# Patient Record
Sex: MALE | Race: BLACK OR AFRICAN AMERICAN | Employment: OTHER | URBAN - METROPOLITAN AREA
[De-identification: names, ages, dates, MRNs, and addresses within clinical notes are randomized per-mention and may not be internally consistent; named-entity substitution may affect disease eponyms.]

---

## 2017-01-09 RX ORDER — PRASUGREL HYDROCHLORIDE 10 MG/1
TABLET, COATED ORAL
Qty: 90 TAB | Refills: 2 | Status: SHIPPED | OUTPATIENT
Start: 2017-01-09 | End: 2017-10-08 | Stop reason: SDUPTHER

## 2017-01-27 RX ORDER — FUROSEMIDE 20 MG/1
TABLET ORAL
Qty: 90 TAB | Refills: 2 | Status: SHIPPED | OUTPATIENT
Start: 2017-01-27 | End: 2017-10-26 | Stop reason: SDUPTHER

## 2017-02-20 ENCOUNTER — OFFICE VISIT (OUTPATIENT)
Dept: INTERNAL MEDICINE CLINIC | Age: 72
End: 2017-02-20

## 2017-02-20 VITALS
SYSTOLIC BLOOD PRESSURE: 126 MMHG | HEIGHT: 74 IN | BODY MASS INDEX: 31.29 KG/M2 | DIASTOLIC BLOOD PRESSURE: 66 MMHG | WEIGHT: 243.8 LBS | OXYGEN SATURATION: 98 % | TEMPERATURE: 98.6 F | RESPIRATION RATE: 16 BRPM | HEART RATE: 84 BPM

## 2017-02-20 DIAGNOSIS — R68.83 CHILLS: ICD-10-CM

## 2017-02-20 DIAGNOSIS — Z20.828 EXPOSURE TO INFLUENZA: ICD-10-CM

## 2017-02-20 DIAGNOSIS — R05.9 COUGH: ICD-10-CM

## 2017-02-20 DIAGNOSIS — J06.9 ACUTE URI: Primary | ICD-10-CM

## 2017-02-20 DIAGNOSIS — R11.0 NAUSEA: ICD-10-CM

## 2017-02-20 DIAGNOSIS — R42 DIZZINESS: ICD-10-CM

## 2017-02-20 LAB
GLUCOSE POC: 193 MG/DL
QUICKVUE INFLUENZA TEST: NEGATIVE
VALID INTERNAL CONTROL?: YES

## 2017-02-20 RX ORDER — DOXYCYCLINE 100 MG/1
100 TABLET ORAL 2 TIMES DAILY
Qty: 14 TAB | Refills: 0 | Status: SHIPPED | OUTPATIENT
Start: 2017-02-20 | End: 2017-02-27

## 2017-02-20 RX ORDER — OSELTAMIVIR PHOSPHATE 75 MG/1
75 CAPSULE ORAL DAILY
Qty: 7 CAP | Refills: 0 | Status: SHIPPED | OUTPATIENT
Start: 2017-02-20 | End: 2017-02-27

## 2017-02-20 NOTE — PATIENT INSTRUCTIONS
It was a pleasure to see you! As discussed:    Upper respiratory tract infection (URI)  You have a bacterial URI. - Take the antibiotics as prescribed. -Use nasal saline spray 3-4 times/day  -Start taking a non sedating antihistamine such as Claritin, Allegra or Zyrtec (generic is fine)  For your  Symptoms:  -Sore throat: Cepacol or similar lozenges, Salt water gargles  -Itching: Thick creams/ lotions; Nonsedating antihistamine such as Allegra, Zyrtec, or Claritin (generic is fine)  -Pain/ Aches: You can use Ibuprofen (no more than 2400 mg/day) or Aleve (no more than 880mg/ day) as needed. Do not use any other NSAIDs while on this medication. Do not use NSAIDs if you have high blood pressure or history of ulcers or abnormal bleeding. OR   -Take Tylenol as needed for headache and body aches (every 4-8 hours, do not use more than 3000mg in one day)    You have been exposed to the flu. I ordered tamiflu. Dizziness: Care Instructions  Your Care Instructions  Dizziness is the feeling of unsteadiness or fuzziness in your head. It is different than having vertigo, which is a feeling that the room is spinning or that you are moving or falling. It is also different from lightheadedness, which is the feeling that you are about to faint. It can be hard to know what causes dizziness. Some people feel dizzy when they have migraine headaches. Sometimes bouts of flu can make you feel dizzy. Some medical conditions, such as heart problems or high blood pressure, can make you feel dizzy. Many medicines can cause dizziness, including medicines for high blood pressure, pain, or anxiety. If a medicine causes your symptoms, your doctor may recommend that you stop or change the medicine. If it is a problem with your heart, you may need medicine to help your heart work better.  If there is no clear reason for your symptoms, your doctor may suggest watching and waiting for a while to see if the dizziness goes away on its own.  Follow-up care is a key part of your treatment and safety. Be sure to make and go to all appointments, and call your doctor if you are having problems. It's also a good idea to know your test results and keep a list of the medicines you take. How can you care for yourself at home? · If your doctor recommends or prescribes medicine, take it exactly as directed. Call your doctor if you think you are having a problem with your medicine. · Do not drive while you feel dizzy. · Try to prevent falls. Steps you can take include:  ¨ Using nonskid mats, adding grab bars near the tub, and using night-lights. ¨ Clearing your home so that walkways are free of anything you might trip on. ¨ Letting family and friends know that you have been feeling dizzy. This will help them know how to help you. When should you call for help? Call 911 anytime you think you may need emergency care. For example, call if:  · You passed out (lost consciousness). · You have dizziness along with symptoms of a heart attack. These may include:  ¨ Chest pain or pressure, or a strange feeling in the chest.  ¨ Sweating. ¨ Shortness of breath. ¨ Nausea or vomiting. ¨ Pain, pressure, or a strange feeling in the back, neck, jaw, or upper belly or in one or both shoulders or arms. ¨ Lightheadedness or sudden weakness. ¨ A fast or irregular heartbeat. · You have symptoms of a stroke. These may include:  ¨ Sudden numbness, tingling, weakness, or loss of movement in your face, arm, or leg, especially on only one side of your body. ¨ Sudden vision changes. ¨ Sudden trouble speaking. ¨ Sudden confusion or trouble understanding simple statements. ¨ Sudden problems with walking or balance. ¨ A sudden, severe headache that is different from past headaches. Call your doctor now or seek immediate medical care if:  · You feel dizzy and have a fever, headache, or ringing in your ears. · You have new or increased nausea and vomiting.   · Your dizziness does not go away or comes back. Watch closely for changes in your health, and be sure to contact your doctor if:  · You do not get better as expected. Where can you learn more? Go to http://vanna-byron.info/. Enter Q957 in the search box to learn more about \"Dizziness: Care Instructions. \"  Current as of: May 27, 2016  Content Version: 11.1  © 0477-2472 [employer]. Care instructions adapted under license by [employer] (which disclaims liability or warranty for this information). If you have questions about a medical condition or this instruction, always ask your healthcare professional. [address] any warranty or liability for your use of this information.

## 2017-02-20 NOTE — MR AVS SNAPSHOT
Visit Information Date & Time Provider Department Dept. Phone Encounter #  
 2/20/2017 10:30 AM Chris Norton MD [facility] Internal Medicine [phone] [id number] Follow-up Instructions Return if symptoms worsen or fail to improve within 7 days, for Follow-up with Dr. Cristino Eid. Your Appointments 3/7/2017  8:30 AM  
ROUTINE CARE with Gisela Travis MD  
[facility] Internal Medicine [facility] Appt Note: 6 month fu  
 [address] [facility] [address]  
[address] [id number] [place] [address] Upcoming Health Maintenance Date Due Hepatitis C Screening 1945 DTaP/Tdap/Td series (1 - Tdap) 1/24/1967 MEDICARE YEARLY EXAM 6/12/2015 INFLUENZA AGE 9 TO ADULT 8/1/2016 HEMOGLOBIN A1C Q6M 8/19/2016 MICROALBUMIN Q1 2/19/2017 LIPID PANEL Q1 2/19/2017 EYE EXAM RETINAL OR DILATED Q1 8/2/2017 FOOT EXAM Q1 9/9/2017 GLAUCOMA SCREENING Q2Y 8/2/2018 COLONOSCOPY 4/19/2026 Allergies as of 2/20/2017  Review Complete On: 2/20/2017 By: Chris Norton MD  
  
 Severity Noted Reaction Type Reactions Pcn [Penicillins]  03/17/2010    Hives Current Immunizations  Reviewed on 12/5/2012 Name Date Influenza Vaccine Whole 10/11/2010 Pneumococcal Polysaccharide (PPSV-23) 12/5/2012 Pneumococcal Vaccine (Unspecified Type) 1/1/2002 Zoster 10/10/2010 Not reviewed this visit You Were Diagnosed With   
  
 Codes Comments Acute URI    -  Primary ICD-10-CM: J06.9 ICD-9-CM: 465.9 Cough     ICD-10-CM: R05 ICD-9-CM: 045. 2 Chills     ICD-10-CM: R68.83 ICD-9-CM: 780.64 Nausea     ICD-10-CM: R11.0 ICD-9-CM: 787.02 Dizziness     ICD-10-CM: O48 ICD-9-CM: 780.4 Exposure to influenza     ICD-10-CM: Z20.828 ICD-9-CM: V01.79 Vitals BP Pulse Temp Resp Height(growth percentile) Weight(growth percentile) 126/66 (BP 1 Location: Right arm, BP Patient Position: Sitting) 84 98.6 °F (37 °C) (Oral) 16 6' 2\" (1.88 m) 243 lb 12.8 oz (110.6 kg) SpO2 BMI Smoking Status 98% 31.3 kg/m2 Never Smoker Vitals History BMI and BSA Data Body Mass Index Body Surface Area  
 31.3 kg/m 2 2.4 m 2 Preferred Pharmacy Pharmacy Name Phone [address] [facility] [address] [facility] [phone] Your Updated Medication List  
  
   
This list is accurate as of: 2/20/17 11:12 AM.  Always use your most recent med list.  
  
  
  
  
 aspirin delayed-release 81 mg tablet Take 81 mg by mouth daily. azithromycin 500 mg Tab Commonly known as:  Radha Ax Take 2 , 1 hour before dental procedure BENADRYL ALLERGY 25 mg tablet Generic drug:  diphenhydrAMINE Take 25 mg by mouth every six (6) hours as needed for Sleep.  
  
 carvedilol 6.25 mg tablet Commonly known as:  COREG  
TAKE 1 TABLET TWICE A DAY WITH MEALS  
  
 CRESTOR 10 mg tablet Generic drug:  rosuvastatin Take 10 mg by mouth nightly. doxycycline 100 mg tablet Commonly known as:  ADOXA Take 1 Tab by mouth two (2) times a day for 7 days. EFFIENT 10 mg tablet Generic drug:  prasugrel TAKE 1 TABLET DAILY fosinopril 10 mg tablet Commonly known as:  MONOPRIL  
TAKE 1 TABLET DAILY  
  
 furosemide 20 mg tablet Commonly known as:  LASIX TAKE 1 TABLET DAILY  
  
 glipiZIDE SR 10 mg CR tablet Commonly known as:  GLUCOTROL XL  
TAKE 1 TABLET DAILY  
  
 glucose blood VI test strips strip Commonly known as:  blood glucose test  
[employer] brand test strips. Dx .00  
  
 metFORMIN 500 mg tablet Commonly known as:  GLUCOPHAGE  
TAKE 2 TABLETS TWICE A DAY WITH MEALS  
  
 nitroglycerin 0.4 mg SL tablet Commonly known as:  NITROSTAT  
1 tablet by SubLINGual route every five (5) minutes as needed for Chest Pain. omeprazole 40 mg capsule Commonly known as:  PRILOSEC Take 1 capsule by mouth daily as needed. oseltamivir 75 mg capsule Commonly known as:  TAMIFLU Take 1 Cap by mouth daily for 7 days. potassium chloride SR 10 mEq tablet Commonly known as:  KLOR-CON 10 Take 3 Tabs by mouth daily. spironolactone 25 mg tablet Commonly known as:  ALDACTONE  
TAKE 1 TABLET DAILY  
  
 tamsulosin 0.4 mg capsule Commonly known as:  FLOMAX Take 1 Cap by mouth daily. TYLENOL EXTRA STRENGTH 500 mg tablet Generic drug:  acetaminophen Take  by mouth every six (6) hours as needed. Prescriptions Sent to Pharmacy Refills  
 doxycycline (ADOXA) 100 mg tablet 0 Sig: Take 1 Tab by mouth two (2) times a day for 7 days. Class: Normal  
 Pharmacy: [facility], [address] [place] [address] RD AT [address] [place] [address] Ph #: [phone] Route: Oral  
 oseltamivir (TAMIFLU) 75 mg capsule 0 Sig: Take 1 Cap by mouth daily for 7 days. Class: Normal  
 Pharmacy: [facility], [address] [place] [address] RD AT [address] [place] [address] Ph #: [phone] Route: Oral  
  
We Performed the Following AMB POC GLUCOSE BLOOD, BY GLUCOSE MONITORING DEVICE [43682 CPT(R)] AMB POC RAPID INFLUENZA TEST [73666 CPT(R)] Follow-up Instructions Return if symptoms worsen or fail to improve within 7 days, for Follow-up with Dr. Morgan Munoz. Patient Instructions It was a pleasure to see you! As discussed: 
 
Upper respiratory tract infection (URI) You have a bacterial URI. - Take the antibiotics as prescribed. -Use nasal saline spray 3-4 times/day 
-Start taking a non sedating antihistamine such as Claritin, Allegra or Zyrtec (generic is fine) For your  Symptoms: 
-Sore throat: Cepacol or similar lozenges, Salt water gargles -Itching: Thick creams/ lotions;  Nonsedating antihistamine such as Allegra, Zyrtec, or Claritin (generic is fine) 
-Pain/ Aches: You can use Ibuprofen (no more than 2400 mg/day) or Aleve (no more than 880mg/ day) as needed. Do not use any other NSAIDs while on this medication. Do not use NSAIDs if you have high blood pressure or history of ulcers or abnormal bleeding. OR  
-Take Tylenol as needed for headache and body aches (every 4-8 hours, do not use more than 3000mg in one day) You have been exposed to the flu. I ordered tamiflu. Dizziness: Care Instructions Your Care Instructions Dizziness is the feeling of unsteadiness or fuzziness in your head. It is different than having vertigo, which is a feeling that the room is spinning or that you are moving or falling. It is also different from lightheadedness, which is the feeling that you are about to faint. It can be hard to know what causes dizziness. Some people feel dizzy when they have migraine headaches. Sometimes bouts of flu can make you feel dizzy. Some medical conditions, such as heart problems or high blood pressure, can make you feel dizzy. Many medicines can cause dizziness, including medicines for high blood pressure, pain, or anxiety. If a medicine causes your symptoms, your doctor may recommend that you stop or change the medicine. If it is a problem with your heart, you may need medicine to help your heart work better. If there is no clear reason for your symptoms, your doctor may suggest watching and waiting for a while to see if the dizziness goes away on its own. Follow-up care is a key part of your treatment and safety. Be sure to make and go to all appointments, and call your doctor if you are having problems. It's also a good idea to know your test results and keep a list of the medicines you take. How can you care for yourself at home? · If your doctor recommends or prescribes medicine, take it exactly as directed. Call your doctor if you think you are having a problem with your medicine. · Do not drive while you feel dizzy. · Try to prevent falls. Steps you can take include: ¨ Using nonskid mats, adding grab bars near the tub, and using night-lights. ¨ Clearing your home so that walkways are free of anything you might trip on. ¨ Letting family and friends know that you have been feeling dizzy. This will help them know how to help you. When should you call for help? Call 911 anytime you think you may need emergency care. For example, call if: 
· You passed out (lost consciousness). · You have dizziness along with symptoms of a heart attack. These may include: ¨ Chest pain or pressure, or a strange feeling in the chest. 
¨ Sweating. ¨ Shortness of breath. ¨ Nausea or vomiting. ¨ Pain, pressure, or a strange feeling in the back, neck, jaw, or upper belly or in one or both shoulders or arms. ¨ Lightheadedness or sudden weakness. ¨ A fast or irregular heartbeat. · You have symptoms of a stroke. These may include: 
¨ Sudden numbness, tingling, weakness, or loss of movement in your face, arm, or leg, especially on only one side of your body. ¨ Sudden vision changes. ¨ Sudden trouble speaking. ¨ Sudden confusion or trouble understanding simple statements. ¨ Sudden problems with walking or balance. ¨ A sudden, severe headache that is different from past headaches. Call your doctor now or seek immediate medical care if: 
· You feel dizzy and have a fever, headache, or ringing in your ears. · You have new or increased nausea and vomiting. · Your dizziness does not go away or comes back. Watch closely for changes in your health, and be sure to contact your doctor if: 
· You do not get better as expected. Where can you learn more? Go to http://vanna-byron.info/. Enter A213 in the search box to learn more about \"Dizziness: Care Instructions. \" Current as of: May 27, 2016 Content Version: 11.1 © 9850-7836 [employer].  Care instructions adapted under license by [address] (which disclaims liability or warranty for this information). If you have questions about a medical condition or this instruction, always ask your healthcare professional. [address] any warranty or liability for your use of this information. Introducing [facility] & HEALTH SERVICES! Dear Mariana Marin: Thank you for requesting a [employer] account. Our records indicate that you have previously registered for a [employer] account but its currently inactive. Please call our [employer] support line at [phone]. Additional Information If you have questions, please visit the Frequently Asked Questions section of the [employer] website at [email or web address]/. Remember, [employer] is NOT to be used for urgent needs. For medical emergencies, dial 911. Now available from your iPhone and Android! Please provide this summary of care documentation to your next provider. Your primary care clinician is listed as [facility]O [facility]. If you have any questions after today's visit, please call [phone].

## 2017-02-20 NOTE — PROGRESS NOTES
HISTORY OF PRESENT ILLNESS  Merlin Gaines is a 71 y.o. male. Cold Symptoms   The problem has been gradually worsening. The cough is productive of purulent sputum. There has been no fever. Associated symptoms include chills, rhinorrhea, myalgias, shortness of breath and nausea. Pertinent negatives include no chest pain, no ear pain, no headaches and no sore throat. Treatments tried: Benadryl and Mucinex        Review of Systems   Constitutional: Positive for chills. HENT: Positive for rhinorrhea. Negative for ear pain and sore throat. Respiratory: Positive for cough and shortness of breath. Cardiovascular: Negative for chest pain. Gastrointestinal: Positive for abdominal pain (cramping ) and nausea. Genitourinary: Negative for dysuria and urgency. Musculoskeletal: Positive for myalgias. Neurological: Positive for dizziness (lightheaded, has not checked his glucose today. has been 139). Negative for headaches.      Patient Active Problem List    Diagnosis Date Noted    Type 2 diabetes, controlled, with peripheral circul[facility] disorder [facility] 02/10/2016    CHF, stage C ([facility] 08/06/2015    Mixed hyperlipidemia 08/06/2015    Proteinuria 06/19/2014    BPH without obstruction/lower urinary tract symptoms 06/11/2014    Hematuria, gross 02/19/2013    Cardiac arrest ([facility] 02/04/2013    Insomnia, unspecified 07/23/2012    Encounter for long-term (current) use of other medications 03/26/2012    Calculus of kidney 03/22/2010    Coronary atherosclerosis of native coronary artery 03/22/2010    Benign neoplasm of colon 03/22/2010    Unspecified sleep apnea 03/22/2010    Reflux esophagitis 03/22/2010       Current Outpatient Prescriptions   Medication Sig Dispense Refill    furosemide (LASIX) 20 mg tablet TAKE 1 TABLET DAILY 90 Tab 2    EFFIENT 10 mg tablet TAKE 1 TABLET DAILY 90 Tab 2    spironolactone (ALDACTONE) 25 mg tablet TAKE 1 TABLET DAILY 90 Tab 3    carvedilol (COREG) 6.25 mg tablet TAKE 1 TABLET TWICE A DAY WITH MEALS 180 Tab 3    glipiZIDE SR (GLUCOTROL) 10 mg CR tablet TAKE 1 TABLET DAILY 90 Tab 3    potassium chloride SR (KLOR-CON 10) 10 mEq tablet Take 3 Tabs by mouth daily. 270 Tab 3    tamsulosin (FLOMAX) 0.4 mg capsule Take 1 Cap by mouth daily. 15 Cap 4    fosinopril (MONOPRIL) 10 mg tablet TAKE 1 TABLET DAILY 90 Tab 3    metFORMIN (GLUCOPHAGE) 500 mg tablet TAKE 2 TABLETS TWICE A DAY WITH MEALS 360 Tab 4    diphenhydrAMINE (BENADRYL ALLERGY) 25 mg tablet Take 25 mg by mouth every six (6) hours as needed for Sleep.  nitroglycerin (NITROSTAT) 0.4 mg SL tablet 1 tablet by SubLINGual route every five (5) minutes as needed for Chest Pain. 25 tablet 99    omeprazole (PRILOSEC) 40 mg capsule Take 1 capsule by mouth daily as needed. 1 capsule 0    rosuvastatin (CRESTOR) 10 mg tablet Take 10 mg by mouth nightly.  glucose blood VI test strips (BLOOD GLUCOSE TEST) strip [employer] test strips. Dx .00 1 Package 11    acetaminophen (TYLENOL EXTRA STRENGTH) 500 mg tablet Take  by mouth every six (6) hours as needed.  aspirin delayed-release 81 mg tablet Take 81 mg by mouth daily.  azithromycin (ZITHROMAX) 500 mg tablet Take 2 , 1 hour before dental procedure 2 tablet 5       Allergies   Allergen Reactions    Pcn [Penicillins] Hives      Visit Vitals    /66 (BP 1 Location: Right arm, BP Patient Position: Sitting)    Pulse 84    Temp 98.6 °F (37 °C) (Oral)    Resp 16    Ht 6' 2\" (1.88 m)    Wt 243 lb 12.8 oz (110.6 kg)    SpO2 98%    BMI 31.3 kg/m2       Physical Exam   Constitutional: He is oriented to person, place, and time. No distress. Wife present in exam room (seen at 10:45 appt)    HENT:   Right Ear: Tympanic membrane, external ear and ear canal normal.   Left Ear: Tympanic membrane, external ear and ear canal normal.   Nose: Mucosal edema and rhinorrhea present. Mouth/Throat: Posterior oropharyngeal erythema present.  No oropharyngeal exudate. Cardiovascular: Normal rate, regular rhythm and normal heart sounds. Exam reveals no friction rub. No murmur heard. Pulmonary/Chest: No accessory muscle usage. No tachypnea and no bradypnea. No respiratory distress. He has no decreased breath sounds. He has no wheezes. He has no rhonchi. He has no rales. Paroxysmal coughing    Musculoskeletal: He exhibits no edema. Lymphadenopathy:     He has cervical adenopathy (shoddy tender ). Neurological: He is alert and oriented to person, place, and time. Skin: He is not diaphoretic. Psychiatric: He has a normal mood and affect. Recent Results (from the past 12 hour(s))   AMB POC RAPID INFLUENZA TEST    Collection Time: 02/20/17 10:45 AM   Result Value Ref Range    VALID INTERNAL CONTROL POC Yes     QuickVue Influenza test Negative Negative     POC glc is 193     ASSESSMENT and PLAN  Amy Francisco was seen today for cough. Diagnoses and all orders for this visit:    Acute URI- likely bacterial given s/s. His flu test was negative but known exposure to wife. Will give tamiflu ppx despite Flu vaccine [id number] non covered strains reported. Red flags to warrant ER or earlier clinical evaluation reviewed. Red flags to warrant ER or earlier clinical evaluation reviewed. -     doxycycline (ADOXA) 100 mg tablet; Take 1 Tab by mouth two (2) times a day for 7 days. Dizziness- likely due to poor po intake. VSS and no cardiac red flags reported. Optimize oral intake Red flags to warrant ER or earlier clinical evaluation reviewed. See AVS for full details of plan and patient discussion.     -     AMB POC GLUCOSE BLOOD, BY GLUCOSE MONITORING DEVICE    Exposure to influenza  -     oseltamivir (TAMIFLU) 75 mg capsule; Take 1 Cap by mouth daily for 7 days. Follow-up Disposition:  Return if symptoms worsen or fail to improve within 7 days, for Follow-up with Dr. Ashleigh Brady.     Medication risks/benefits/costs/interactions/alternatives discussed with patient and wife . Eden Calderon  was given an after visit summary which includes diagnoses, current medications, & vitals. he expressed understanding with the diagnosis and plan.     20 minutes of 25 minutes of care coordination was spent counseling patient on treatment plan and assessing understanding

## 2017-02-27 ENCOUNTER — OFFICE VISIT (OUTPATIENT)
Dept: INTERNAL MEDICINE CLINIC | Age: 72
End: 2017-02-27

## 2017-02-27 VITALS
SYSTOLIC BLOOD PRESSURE: 122 MMHG | DIASTOLIC BLOOD PRESSURE: 70 MMHG | WEIGHT: 242.8 LBS | OXYGEN SATURATION: 99 % | HEART RATE: 68 BPM | RESPIRATION RATE: 16 BRPM | HEIGHT: 74 IN | BODY MASS INDEX: 31.16 KG/M2 | TEMPERATURE: 97.4 F

## 2017-02-27 DIAGNOSIS — R05.9 COUGH: ICD-10-CM

## 2017-02-27 DIAGNOSIS — R09.82 PND (POST-NASAL DRIP): Primary | ICD-10-CM

## 2017-02-27 RX ORDER — CETIRIZINE HYDROCHLORIDE 5 MG/1
5 TABLET ORAL DAILY
Qty: 30 TAB | Refills: 0 | Status: SHIPPED | OUTPATIENT
Start: 2017-02-27 | End: 2017-03-13

## 2017-02-27 RX ORDER — HYDROCODONE POLISTIREX AND CHLORPHENIRAMINE POLISTIREX 10; 8 MG/5ML; MG/5ML
5 SUSPENSION, EXTENDED RELEASE ORAL
Qty: 115 ML | Refills: 0 | Status: SHIPPED | OUTPATIENT
Start: 2017-02-27 | End: 2017-10-18

## 2017-02-27 NOTE — MR AVS SNAPSHOT
Visit Information Date & Time Provider Department Dept. Phone Encounter #  
 2/27/2017 10:45 AM Jennett Lanes, MD [facility] Internal Medicine [phone] Follow-up Instructions Return if symptoms worsen or fail to improve. Your Appointments 3/7/2017  8:30 AM  
ROUTINE CARE with Stiven Nicholas MD  
[facility] [place] Internal Medicine [address] [facility]) Appt Note: 6 month fu  
 [address] [facility] [address] [facility] [address]  
[address] [address] [facility] Upcoming Health Maintenance Date Due Hepatitis C Screening 1945 DTaP/Tdap/Td series (1 - Tdap) 1/24/1967 MEDICARE YEARLY EXAM 6/12/2015 INFLUENZA AGE 9 TO ADULT 8/1/2016 HEMOGLOBIN A1C Q6M 8/19/2016 MICROALBUMIN Q1 2/19/2017 LIPID PANEL Q1 2/19/2017 FOOT EXAM Q1 9/9/2017 EYE EXAM RETINAL OR DILATED Q1 2/7/2018 GLAUCOMA SCREENING Q2Y 2/7/2019 COLONOSCOPY 4/19/2026 Allergies as of 2/27/2017  Review Complete On: 2/27/2017 By: Jennett Lanes, MD  
  
 Severity Noted Reaction Type Reactions Pcn [Penicillins]  03/17/2010    Hives Current Immunizations  Reviewed on 12/5/2012 Name Date Influenza Vaccine Whole 10/11/2010 Pneumococcal Polysaccharide (PPSV-23) 12/5/2012 Pneumococcal Vaccine (Unspecified Type) 1/1/2002 Zoster 10/10/2010 Not reviewed this visit You Were Diagnosed With   
  
 Codes Comments PND (post-nasal drip)    -  Primary ICD-10-CM: R09.82 ICD-9-CM: 784.91 Cough     ICD-10-CM: R05 ICD-9-CM: 472. 2 Vitals BP  
  
  
  
  
  
 122/70 (BP 1 Location: Right arm, BP Patient Position: Sitting) Vitals History BMI and BSA Data Body Mass Index Body Surface Area  
 31.17 kg/m 2 2.4 m 2 Preferred Pharmacy Pharmacy Name Phone  [facility] [phone] Your Updated Medication List  
  
   
This list is accurate as of: 2/27/17 11:23 AM.  Always use your most recent med list.  
  
  
  
  
 aspirin delayed-release 81 mg tablet Take 81 mg by mouth daily. azithromycin 500 mg Tab Commonly known as:  Charlotta Primrose Take 2 , 1 hour before dental procedure  
  
 carvedilol 6.25 mg tablet Commonly known as:  COREG  
TAKE 1 TABLET TWICE A DAY WITH MEALS  
  
 cetirizine 5 mg tablet Commonly known as:  ZYRTEC Take 1 Tab by mouth daily for 14 days. chlorpheniramine-HYDROcodone 10-8 mg/5 mL suspension Commonly known as:  Venkatesh Skeens Take 5 mL by mouth nightly as needed for Cough. Max Daily Amount: 5 mL. CRESTOR 10 mg tablet Generic drug:  rosuvastatin Take 10 mg by mouth nightly. doxycycline 100 mg tablet Commonly known as:  ADOXA Take 1 Tab by mouth two (2) times a day for 7 days. EFFIENT 10 mg tablet Generic drug:  prasugrel TAKE 1 TABLET DAILY fosinopril 10 mg tablet Commonly known as:  MONOPRIL  
TAKE 1 TABLET DAILY  
  
 furosemide 20 mg tablet Commonly known as:  LASIX TAKE 1 TABLET DAILY  
  
 glipiZIDE SR 10 mg CR tablet Commonly known as:  GLUCOTROL XL  
TAKE 1 TABLET DAILY  
  
 glucose blood VI test strips strip Commonly known as:  blood glucose test  
[employer] brand test strips. Dx .00  
  
 metFORMIN 500 mg tablet Commonly known as:  GLUCOPHAGE  
TAKE 2 TABLETS TWICE A DAY WITH MEALS  
  
 nitroglycerin 0.4 mg SL tablet Commonly known as:  NITROSTAT  
1 tablet by SubLINGual route every five (5) minutes as needed for Chest Pain. omeprazole 40 mg capsule Commonly known as:  PRILOSEC Take 1 capsule by mouth daily as needed. oseltamivir 75 mg capsule Commonly known as:  TAMIFLU Take 1 Cap by mouth daily for 7 days. potassium chloride SR 10 mEq tablet Commonly known as:  KLOR-CON 10 Take 3 Tabs by mouth daily. spironolactone 25 mg tablet Commonly known as:  ALDACTONE  
TAKE 1 TABLET DAILY  
  
 tamsulosin 0.4 mg capsule Commonly known as:  FLOMAX Take 1 Cap by mouth daily. TYLENOL EXTRA STRENGTH 500 mg tablet Generic drug:  acetaminophen Take  by mouth every six (6) hours as needed. Prescriptions Printed Refills  
 chlorpheniramine-HYDROcodone (TUSSIONEX) 10-8 mg/5 mL suspension 0 Sig: Take 5 mL by mouth nightly as needed for Cough. Max Daily Amount: 5 mL. Class: Print Route: Oral  
  
Prescriptions Sent to Pharmacy Refills  
 cetirizine (ZYRTEC) 5 mg tablet 0 Sig: Take 1 Tab by mouth daily for 14 days. Class: Normal  
 Pharmacy: [facility] [address], [address] [place] [address] RD AT [address] [facility] #: [phone] Route: Oral  
  
Follow-up Instructions Return if symptoms worsen or fail to improve. Patient Instructions It was a pleasure to see you! As discussed: 
 
Post nasal drip 
-Use nasal saline spray 3-4 times/day  
-Start non sedating antihistamine such as Claritin, Allegra or Zyrtec (generic is fine) in the day;  
-Add Benadryl 25mg every evening 2 hours before bedtime if night time coughing is a problem OR take Tussinonex Cough: Care Instructions Your Care Instructions A cough is your body's response to something that bothers your throat or airways. Many things can cause a cough. You might cough because of a cold or the flu, bronchitis, or asthma. Smoking, postnasal drip, allergies, and stomach acid that backs up into your throat also can cause coughs. A cough is a symptom, not a disease. Most coughs stop when the cause, such as a cold, goes away. You can take a few steps at home to cough less and feel better. Follow-up care is a key part of your treatment and safety.  Be sure to make and go to all appointments, and call your doctor if you are having problems. It's also a good idea to know your test results and keep a list of the medicines you take. How can you care for yourself at home? · Drink lots of water and other fluids. This helps thin the mucus and soothes a dry or sore throat. Honey or lemon juice in hot water or tea may ease a dry cough. · Take cough medicine as directed by your doctor. · Prop up your head on pillows to help you breathe and ease a dry cough. · Try cough drops to soothe a dry or sore throat. Cough drops don't stop a cough. Medicine-flavored cough drops are no better than candy-flavored drops or hard candy. · Do not smoke. Avoid secondhand smoke. If you need help quitting, talk to your doctor about stop-smoking programs and medicines. These can increase your chances of quitting for good. When should you call for help? Call 911 anytime you think you may need emergency care. For example, call if: 
· You have severe trouble breathing. Call your doctor now or seek immediate medical care if: 
· You cough up blood. · You have new or worse trouble breathing. · You have a new or higher fever. · You have a new rash. Watch closely for changes in your health, and be sure to contact your doctor if: 
· You cough more deeply or more often, especially if you notice more mucus or a change in the color of your mucus. · You have new symptoms, such as a sore throat, an earache, or sinus pain. · You do not get better as expected. Where can you learn more? Go to http://vanna-byron.info/. Enter D279 in the search box to learn more about \"Cough: Care Instructions. \" Current as of: May 27, 2016 Content Version: 11.1 © 1058-0888 [employer]. Care instructions adapted under license by [employer] (which disclaims liability or warranty for this information).  If you have questions about a medical condition or this instruction, always ask your healthcare professional. [employer] disclaims any warranty or liability for your use of this information. Introducing [facility] & HEALTH SERVICES! Dear Mariana Marin: Thank you for requesting a [employer] account. Our records indicate that you have previously registered for a [employer] account but its currently inactive. Please call our [employer] support line at [phone]. Additional Information If you have questions, please visit the Frequently Asked Questions section of the [employer] website at [email or web address] Remember, [employer] is NOT to be used for urgent needs. For medical emergencies, dial 911. Now available from your iPhone and Android! Please provide this summary of care documentation to your next provider. Your primary care clinician is listed as LOCO HUITRON. If you have any questions after today's visit, please call [phone].

## 2017-02-27 NOTE — PROGRESS NOTES
HISTORY OF PRESENT ILLNESS  Ervin Goodell is a 71 y.o. male. [facility]  URI Follow-up   Patient presents for follow-up. Seen   7 days ago. Placed on-  Doxycycline and Tamiflu ppx   Reports  Improved:  cough described as productive of green/yellow sputum   Still has: cough described as productive of clear sputum and post nasal drip. Denies:  chest pain , rhinorrhea. Review of Systems   Constitutional: Positive for malaise/fatigue. Respiratory: Positive for cough and sputum production. Cardiovascular: Negative for chest pain. otherwise per [facility]     Patient Active Problem List    Diagnosis Date Noted    Type 2 diabetes, controlled, with peripheral circulatory disorder ([facility] 02/10/2016    [facility], [facility] 08/06/2015    Mixed hyperlipidemia 08/06/2015    Proteinuria 06/19/2014    BPH without obstruction/lower urinary tract symptoms 06/11/2014    Hematuria, gross 02/19/2013    [facility] 02/04/2013    Insomnia, unspecified 07/23/2012    Encounter for long-term (current) use of other medications 03/26/2012    Calculus of kidney 03/22/2010    Coronary atherosclerosis of native coronary artery 03/22/2010    Benign neoplasm of colon 03/22/2010    Unspecified sleep apnea 03/22/2010    Reflux esophagitis 03/22/2010       Current Outpatient Prescriptions   Medication Sig Dispense Refill    doxycycline (ADOXA) 100 mg tablet Take 1 Tab by mouth two (2) times a day for 7 days. 14 Tab 0    furosemide (LASIX) 20 mg tablet TAKE 1 TABLET DAILY 90 Tab 2    EFFIENT 10 mg tablet TAKE 1 TABLET DAILY 90 Tab 2    spironolactone (ALDACTONE) 25 mg tablet TAKE 1 TABLET DAILY 90 Tab 3    carvedilol (COREG) 6.25 mg tablet TAKE 1 TABLET TWICE A DAY WITH MEALS 180 Tab 3    glipiZIDE SR (GLUCOTROL) 10 mg CR tablet TAKE 1 TABLET DAILY 90 Tab 3    potassium chloride SR (KLOR-CON 10) 10 mEq tablet Take 3 Tabs by mouth daily. 270 Tab 3    tamsulosin (FLOMAX) 0.4 mg capsule Take 1 Cap by mouth daily.  15 Cap 4    fosinopril (MONOPRIL) 10 mg tablet TAKE 1 TABLET DAILY 90 Tab 3    metFORMIN (GLUCOPHAGE) 500 mg tablet TAKE 2 TABLETS TWICE A DAY WITH MEALS 360 Tab 4    diphenhydrAMINE (BENADRYL ALLERGY) 25 mg tablet Take 25 mg by mouth every six (6) hours as needed for Sleep.  nitroglycerin (NITROSTAT) 0.4 mg SL tablet 1 tablet by SubLINGual route every five (5) minutes as needed for Chest Pain. 25 tablet 99    azithromycin (ZITHROMAX) 500 mg tablet Take 2 , 1 hour before dental procedure 2 tablet 5    omeprazole (PRILOSEC) 40 mg capsule Take 1 capsule by mouth daily as needed. 1 capsule 0    rosuvastatin (CRESTOR) 10 mg tablet Take 10 mg by mouth nightly.  glucose blood VI test strips (BLOOD GLUCOSE TEST) strip Nexvet test strips. Dx .00 1 Package 11    acetaminophen (TYLENOL EXTRA STRENGTH) 500 mg tablet Take  by mouth every six (6) hours as needed.  aspirin delayed-release 81 mg tablet Take 81 mg by mouth daily.  oseltamivir (TAMIFLU) 75 mg capsule Take 1 Cap by mouth daily for 7 days. 7 Cap 0       Allergies   Allergen Reactions    Pcn [Penicillins] Hives      Visit Vitals    /70 (BP 1 Location: Right arm, BP Patient Position: Sitting)    Pulse 68    Temp 97.4 °F (36.3 °C) (Oral)    Resp 16    Ht 6' 2\" (1.88 m)    Wt 242 lb 12.8 oz (110.1 kg)    SpO2 99%    BMI 31.17 kg/m2       Physical Exam   Constitutional: He is oriented to person, place, and time. No distress. Wife present    HENT:   Nose: Mucosal edema (left nostril friable ) and rhinorrhea present. Right sinus exhibits no maxillary sinus tenderness and no frontal sinus tenderness. Left sinus exhibits no maxillary sinus tenderness and no frontal sinus tenderness. Mouth/Throat: Posterior oropharyngeal erythema present. Cardiovascular: Normal rate and regular rhythm. Pulmonary/Chest: Breath sounds normal. No respiratory distress. He has no wheezes. He has no rales.    Lymphadenopathy:     He has cervical adenopathy (ttp shoddy ). Neurological: He is alert and oriented to person, place, and time. Psychiatric: He has a normal mood and affect. ASSESSMENT and Serafinerwin Mares was seen today for cough due to PND. Diagnoses and all orders for this visit:    PND (post-nasal drip)- Post nasal drip  -Use nasal saline spray 3-4 times/day   -Start non sedating antihistamine such as Claritin, Allegra or Zyrtec (generic is fine) in the day; Xyzal ordered   -Add Benadryl 25mg every evening 2 hours before bedtime if night time coughing is a problem    -     chlorpheniramine-HYDROcodone (TUSSIONEX) 10-8 mg/5 mL suspension; Take 5 mL by mouth nightly as needed for Cough. Max Daily Amount: 5 mL. -     cetirizine (ZYRTEC) 5 mg tablet; Take 1 Tab by mouth daily for 14 days. Cough  -     cetirizine (ZYRTEC) 5 mg tablet; Take 1 Tab by mouth daily for 14 days. Follow-up Disposition:  Return if symptoms worsen or fail to improve. Medication risks/benefits/costs/interactions/alternatives discussed with patient. Yoko Obrien  was given an after visit summary which includes diagnoses, current medications, & vitals. he expressed understanding with the diagnosis and plan.

## 2017-02-27 NOTE — PATIENT INSTRUCTIONS
It was a pleasure to see you! As discussed:    Post nasal drip  -Use nasal saline spray 3-4 times/day   -Start non sedating antihistamine such as Claritin, Allegra or Zyrtec (generic is fine) in the day;   -Add Benadryl 25mg every evening 2 hours before bedtime if night time coughing is a problem OR take Tussinonex      Cough: Care Instructions  Your Care Instructions  A cough is your body's response to something that bothers your throat or airways. Many things can cause a cough. You might cough because of a cold or the flu, bronchitis, or asthma. Smoking, postnasal drip, allergies, and stomach acid that backs up into your throat also can cause coughs. A cough is a symptom, not a disease. Most coughs stop when the cause, such as a cold, goes away. You can take a few steps at home to cough less and feel better. Follow-up care is a key part of your treatment and safety. Be sure to make and go to all appointments, and call your doctor if you are having problems. It's also a good idea to know your test results and keep a list of the medicines you take. How can you care for yourself at home? · Drink lots of water and other fluids. This helps thin the mucus and soothes a dry or sore throat. Honey or lemon juice in hot water or tea may ease a dry cough. · Take cough medicine as directed by your doctor. · Prop up your head on pillows to help you breathe and ease a dry cough. · Try cough drops to soothe a dry or sore throat. Cough drops don't stop a cough. Medicine-flavored cough drops are no better than candy-flavored drops or hard candy. · Do not smoke. Avoid secondhand smoke. If you need help quitting, talk to your doctor about stop-smoking programs and medicines. These can increase your chances of quitting for good. When should you call for help? Call 911 anytime you think you may need emergency care. For example, call if:  · You have severe trouble breathing.   Call your doctor now or seek immediate medical care if:  · You cough up blood. · You have new or worse trouble breathing. · You have a new or higher fever. · You have a new rash. Watch closely for changes in your health, and be sure to contact your doctor if:  · You cough more deeply or more often, especially if you notice more mucus or a change in the color of your mucus. · You have new symptoms, such as a sore throat, an earache, or sinus pain. · You do not get better as expected. Where can you learn more? Go to http://avnna-byron.info/. Enter D279 in the search box to learn more about \"Cough: Care Instructions. \"  Current as of: May 27, 2016  Content Version: 11.1  © 5891-5167 [employer], [employer]. Care instructions adapted under license by [employer] (which disclaims liability or warranty for this information). If you have questions about a medical condition or this instruction, always ask your healthcare professional. [address] any warranty or liability for your use of this information.

## 2017-03-07 ENCOUNTER — OFFICE VISIT (OUTPATIENT)
Dept: INTERNAL MEDICINE CLINIC | Age: 72
End: 2017-03-07

## 2017-03-07 VITALS
OXYGEN SATURATION: 97 % | TEMPERATURE: 97.9 F | SYSTOLIC BLOOD PRESSURE: 140 MMHG | RESPIRATION RATE: 16 BRPM | HEIGHT: 74 IN | DIASTOLIC BLOOD PRESSURE: 80 MMHG | WEIGHT: 243.2 LBS | BODY MASS INDEX: 31.21 KG/M2 | HEART RATE: 77 BPM

## 2017-03-07 DIAGNOSIS — N40.0 BENIGN PROSTATIC HYPERPLASIA WITHOUT LOWER URINARY TRACT SYMPTOMS, UNSPECIFIED MORPHOLOGY: ICD-10-CM

## 2017-03-07 DIAGNOSIS — E78.2 MIXED HYPERLIPIDEMIA: ICD-10-CM

## 2017-03-07 DIAGNOSIS — Z11.59 NEED FOR HEPATITIS C SCREENING TEST: ICD-10-CM

## 2017-03-07 DIAGNOSIS — I25.10 ATHEROSCLEROSIS OF NATIVE CORONARY ARTERY OF NATIVE HEART WITHOUT ANGINA PECTORIS: ICD-10-CM

## 2017-03-07 DIAGNOSIS — E11.51 TYPE 2 DIABETES, CONTROLLED, WITH PERIPHERAL CIRCULATORY DISORDER (HCC): Primary | ICD-10-CM

## 2017-03-07 DIAGNOSIS — Z78.9 ACTIVE ADVANCE DIRECTIVE ON FILE: ICD-10-CM

## 2017-03-07 NOTE — MR AVS SNAPSHOT
Visit Information Date & Time Provider Department Dept. Phone Encounter #  
 3/7/2017  8:30 AM Adolfo Osborne, [address] [place] [address] Internal Medicine [phone] [id number] Follow-up Instructions Return in about 6 months (around 9/7/2017). Upcoming Health Maintenance Date Due Hepatitis C Screening 1945 DTaP/Tdap/Td series (1 - Tdap) 1/24/1967 MEDICARE YEARLY EXAM 6/12/2015 INFLUENZA AGE 9 TO ADULT 8/1/2016 HEMOGLOBIN A1C Q6M 8/19/2016 MICROALBUMIN Q1 2/19/2017 LIPID PANEL Q1 2/19/2017 FOOT EXAM Q1 9/9/2017 EYE EXAM RETINAL OR DILATED Q1 2/7/2018 GLAUCOMA SCREENING Q2Y 2/7/2019 COLONOSCOPY 4/19/2026 Allergies as of 3/7/2017  Review Complete On: 3/7/2017 By: Paty Santana Severity Noted Reaction Type Reactions Pcn [Penicillins]  03/17/2010    Hives Current Immunizations  Reviewed on 3/7/2017 Name Date Influenza High Dose Vaccine PF 10/1/2016 Influenza Vaccine Whole 10/11/2010 Pneumococcal Polysaccharide (PPSV-23) 12/5/2012 Pneumococcal Vaccine (Unspecified Type) 1/1/2002 Zoster 10/10/2010 Reviewed by Paty Santana on 3/7/2017 at  8:31 AM  
You Were Diagnosed With   
  
 Codes Comments Type 2 diabetes, controlled, with peripheral circulatory disorder (HCC)    -  Primary ICD-10-CM: E11.51 
ICD-9-CM: 250.70, 443.81 Active advance directive on file     ICD-10-CM: Z78.9 ICD-9-CM: V49.89 Mixed hyperlipidemia     ICD-10-CM: E78.2 ICD-9-CM: 272.2 Need for hepatitis C screening test     ICD-10-CM: Z11.59 
ICD-9-CM: V73.89 Atherosclerosis of native coronary artery of native heart without angina pectoris     ICD-10-CM: I25.10 ICD-9-CM: 414.01 Benign prostatic hyperplasia without lower urinary tract symptoms, unspecified morphology     ICD-10-CM: N40.0 ICD-9-CM: 600.00 Vitals BP Pulse Temp Resp Height(growth percentile) Weight(growth percentile) 140/80 (BP 1 Location: Right arm, BP Patient Position: Sitting) 77 97.9 °F (36.6 °C) (Oral) 16 6' 2\" (1.88 m) 243 lb 3.2 oz (110.3 kg) SpO2 BMI Smoking Status 97% 31.23 kg/m2 Never Smoker BMI and BSA Data Body Mass Index Body Surface Area  
 31.23 kg/m 2 2.4 m 2 Preferred Pharmacy Pharmacy Name Phone [address] [facility] [address], [address] [facility] [phone] Your Updated Medication List  
  
   
This list is accurate as of: 3/7/17  8:55 AM.  Always use your most recent med list.  
  
  
  
  
 aspirin delayed-release 81 mg tablet Take 81 mg by mouth daily. azithromycin 500 mg Tab Commonly known as:  Eward Evin Take 2 , 1 hour before dental procedure  
  
 carvedilol 6.25 mg tablet Commonly known as:  COREG  
TAKE 1 TABLET TWICE A DAY WITH MEALS  
  
 cetirizine 5 mg tablet Commonly known as:  ZYRTEC Take 1 Tab by mouth daily for 14 days. chlorpheniramine-HYDROcodone 10-8 mg/5 mL suspension Commonly known as:  Juan Field Take 5 mL by mouth nightly as needed for Cough. Max Daily Amount: 5 mL. CRESTOR 10 mg tablet Generic drug:  rosuvastatin Take 10 mg by mouth nightly. EFFIENT 10 mg tablet Generic drug:  prasugrel TAKE 1 TABLET DAILY fosinopril 10 mg tablet Commonly known as:  MONOPRIL  
TAKE 1 TABLET DAILY  
  
 furosemide 20 mg tablet Commonly known as:  LASIX TAKE 1 TABLET DAILY  
  
 glipiZIDE SR 10 mg CR tablet Commonly known as:  GLUCOTROL XL  
TAKE 1 TABLET DAILY  
  
 glucose blood VI test strips strip Commonly known as:  blood glucose test  
[employer] brand test strips. Dx .00  
  
 metFORMIN 500 mg tablet Commonly known as:  GLUCOPHAGE  
TAKE 2 TABLETS TWICE A DAY WITH MEALS  
  
 nitroglycerin 0.4 mg SL tablet Commonly known as:  NITROSTAT  
1 tablet by SubLINGual route every five (5) minutes as needed for Chest Pain. omeprazole 40 mg capsule Commonly known as:  PRILOSEC Take 1 capsule by mouth daily as needed. potassium chloride SR 10 mEq tablet Commonly known as:  KLOR-CON 10 Take 3 Tabs by mouth daily. spironolactone 25 mg tablet Commonly known as:  ALDACTONE  
TAKE 1 TABLET DAILY  
  
 tamsulosin 0.4 mg capsule Commonly known as:  FLOMAX Take 1 Cap by mouth daily. TYLENOL EXTRA STRENGTH 500 mg tablet Generic drug:  acetaminophen Take  by mouth every six (6) hours as needed. We Performed the Following CBC WITH AUTOMATED DIFF [30835 CPT(R)] HEMOGLOBIN A1C WITH EAG [29185 CPT(R)] HEPATITIS C AB [73685 CPT(R)] LIPID PANEL [90492 CPT(R)] METABOLIC PANEL, COMPREHENSIVE [77700 CPT(R)] MICROALBUMIN, UR, RAND W/ MICROALBUMIN/CREA RATIO [id number] CPT(R)] URINALYSIS W/MICROSCOPIC [18336 CPT(R)] Follow-up Instructions Return in about 6 months (around 9/7/2017). Introducing [facility] & HEALTH SERVICES! Dear Evelin Mao: Thank you for requesting a [employer] account. Our records indicate that you have previously registered for a [employer] account but its currently inactive. Please call our [employer] support line at [phone]. Additional Information If you have questions, please visit the Frequently Asked Questions section of the [employer] website at [email or web address] Remember, [employer] is NOT to be used for urgent needs. For medical emergencies, dial 911. Now available from your iPhone and Android! Please provide this summary of care documentation to your next provider. Your primary care clinician is listed as LOCO HUITRON. If you have any questions after today's visit, please call [phone].

## 2017-03-07 NOTE — PROGRESS NOTES
HISTORY OF PRESENT ILLNESS  Marcela Ayers is a 71 y.o. male. HPI Pedro Salomon is seen today for follow up of a URI, diabetes and other medical concerns. 1. URI. This resolved. He has seen my partner Dr. Yareli Zepeda. 2. Diabetes, type 2, controlled, no complication without long term insulin usage, mixed hyperlipidemia. Overdue for labs. We cannot really pin down if he has had these at the [place] or not. We will get labs today. I assured him he could take the results to the [place] if required. 3. CAD bypass graft native heart without angina. Clinically stable. Up to date with cardiology follow up. 4. Preventive medicine. CPE in six months. MedDATA/gwo     Current Outpatient Prescriptions   Medication Sig    chlorpheniramine-HYDROcodone (TUSSIONEX) 10-8 mg/5 mL suspension Take 5 mL by mouth nightly as needed for Cough. Max Daily Amount: 5 mL.  furosemide (LASIX) 20 mg tablet TAKE 1 TABLET DAILY    EFFIENT 10 mg tablet TAKE 1 TABLET DAILY    spironolactone (ALDACTONE) 25 mg tablet TAKE 1 TABLET DAILY    carvedilol (COREG) 6.25 mg tablet TAKE 1 TABLET TWICE A DAY WITH MEALS    glipiZIDE SR (GLUCOTROL) 10 mg CR tablet TAKE 1 TABLET DAILY    potassium chloride SR (KLOR-CON 10) 10 mEq tablet Take 3 Tabs by mouth daily.  tamsulosin (FLOMAX) 0.4 mg capsule Take 1 Cap by mouth daily.  fosinopril (MONOPRIL) 10 mg tablet TAKE 1 TABLET DAILY    metFORMIN (GLUCOPHAGE) 500 mg tablet TAKE 2 TABLETS TWICE A DAY WITH MEALS    nitroglycerin (NITROSTAT) 0.4 mg SL tablet 1 tablet by SubLINGual route every five (5) minutes as needed for Chest Pain.  azithromycin (ZITHROMAX) 500 mg tablet Take 2 , 1 hour before dental procedure    omeprazole (PRILOSEC) 40 mg capsule Take 1 capsule by mouth daily as needed.  rosuvastatin (CRESTOR) 10 mg tablet Take 10 mg by mouth nightly.  glucose blood VI test strips (BLOOD GLUCOSE TEST) strip [employer] test strips.  Dx .00    acetaminophen (TYLENOL EXTRA STRENGTH) 500 mg tablet Take  by mouth every six (6) hours as needed.  aspirin delayed-release 81 mg tablet Take 81 mg by mouth daily. No current facility-administered medications for this visit. Review of Systems   Constitutional: Negative for weight loss. Respiratory: Positive for cough. Cardiovascular: Negative for chest pain, palpitations, leg swelling and PND. Genitourinary: Negative for frequency. Musculoskeletal: Negative for myalgias. Neurological: Negative for focal weakness. Physical Exam   Constitutional: No distress. Neck: Carotid bruit is not present. Cardiovascular: Normal rate and regular rhythm. Exam reveals no gallop and no friction rub. No murmur heard. Pulmonary/Chest: Effort normal and breath sounds normal. No respiratory distress. Musculoskeletal: He exhibits no edema. Nursing note and vitals reviewed. ASSESSMENT and PLAN  Pamela Prado was seen today for medication evaluation. Diagnoses and all orders for this visit:    Type 2 diabetes, controlled, with peripheral circulatory disorder (HCC)  -     MICROALBUMIN, UR, RAND W/ MICROALBUMIN/CREA RATIO  -     HEMOGLOBIN A1C WITH EAG  -     METABOLIC PANEL, COMPREHENSIVE  -     URINALYSIS W/MICROSCOPIC    Active advance directive on file    Mixed hyperlipidemia  -     LIPID PANEL  -     CBC WITH AUTOMATED DIFF    Need for hepatitis C screening test  -     HEPATITIS C AB    Atherosclerosis of native coronary artery of native heart without angina pectoris- See cardiologist as directed. Benign prostatic hyperplasia without lower urinary tract symptoms, unspecified morphology  -     URINALYSIS W/MICROSCOPIC    Other orders  -     MICROSCOPIC EXAMINATION    This has been fully explained to the patient, who indicates understanding.

## 2017-03-08 LAB
ALBUMIN SERPL-MCNC: 4 G/DL (ref 3.5–4.8)
ALBUMIN/CREAT UR: 9.9 MG/G CREAT (ref 0–30)
ALBUMIN/GLOB SERPL: 1.5 {RATIO} (ref 1.1–2.5)
ALP SERPL-CCNC: 92 IU/L (ref 39–117)
ALT SERPL-CCNC: 11 IU/L (ref 0–44)
APPEARANCE UR: CLEAR
AST SERPL-CCNC: 40 IU/L (ref 0–40)
BACTERIA #/AREA URNS HPF: NORMAL /[HPF]
BASOPHILS # BLD AUTO: 0 X10E3/UL (ref 0–0.2)
BASOPHILS NFR BLD AUTO: 1 %
BILIRUB SERPL-MCNC: 0.4 MG/DL (ref 0–1.2)
BILIRUB UR QL STRIP: NEGATIVE
BUN SERPL-MCNC: 13 MG/DL (ref 8–27)
BUN/CREAT SERPL: 15 (ref 10–22)
CALCIUM SERPL-MCNC: 9.1 MG/DL (ref 8.6–10.2)
CASTS URNS QL MICRO: NORMAL /LPF
CHLORIDE SERPL-SCNC: 104 MMOL/L (ref 96–106)
CHOLEST SERPL-MCNC: 124 MG/DL (ref 100–199)
CO2 SERPL-SCNC: 24 MMOL/L (ref 18–29)
COLOR UR: YELLOW
CREAT SERPL-MCNC: 0.87 MG/DL (ref 0.76–1.27)
CREAT UR-MCNC: 298.9 MG/DL
EOSINOPHIL # BLD AUTO: 0.1 X10E3/UL (ref 0–0.4)
EOSINOPHIL NFR BLD AUTO: 2 %
EPI CELLS #/AREA URNS HPF: NORMAL /HPF
ERYTHROCYTE [DISTWIDTH] IN BLOOD BY AUTOMATED COUNT: 15.6 % (ref 12.3–15.4)
EST. AVERAGE GLUCOSE BLD GHB EST-MCNC: 166 MG/DL
GLOBULIN SER CALC-MCNC: 2.7 G/DL (ref 1.5–4.5)
GLUCOSE SERPL-MCNC: 138 MG/DL (ref 65–99)
GLUCOSE UR QL: NEGATIVE
HBA1C MFR BLD: 7.4 % (ref 4.8–5.6)
HCT VFR BLD AUTO: 39.7 % (ref 37.5–51)
HCV AB S/CO SERPL IA: <0.1 S/CO RATIO (ref 0–0.9)
HDLC SERPL-MCNC: 39 MG/DL
HGB BLD-MCNC: 12.7 G/DL (ref 12.6–17.7)
HGB UR QL STRIP: NEGATIVE
IMM GRANULOCYTES # BLD: 0 X10E3/UL (ref 0–0.1)
IMM GRANULOCYTES NFR BLD: 0 %
KETONES UR QL STRIP: NEGATIVE
LDLC SERPL CALC-MCNC: 68 MG/DL (ref 0–99)
LEUKOCYTE ESTERASE UR QL STRIP: NEGATIVE
LYMPHOCYTES # BLD AUTO: 1.1 X10E3/UL (ref 0.7–3.1)
LYMPHOCYTES NFR BLD AUTO: 34 %
MCH RBC QN AUTO: 25.3 PG (ref 26.6–33)
MCHC RBC AUTO-ENTMCNC: 32 G/DL (ref 31.5–35.7)
MCV RBC AUTO: 79 FL (ref 79–97)
MICRO URNS: ABNORMAL
MICROALBUMIN UR-MCNC: 29.7 UG/ML
MONOCYTES # BLD AUTO: 0.3 X10E3/UL (ref 0.1–0.9)
MONOCYTES NFR BLD AUTO: 9 %
MUCOUS THREADS URNS QL MICRO: PRESENT
NEUTROPHILS # BLD AUTO: 1.8 X10E3/UL (ref 1.4–7)
NEUTROPHILS NFR BLD AUTO: 54 %
NITRITE UR QL STRIP: NEGATIVE
PH UR STRIP: 6 [PH] (ref 5–7.5)
PLATELET # BLD AUTO: 245 X10E3/UL (ref 150–379)
POTASSIUM SERPL-SCNC: 4.7 MMOL/L (ref 3.5–5.2)
PROT SERPL-MCNC: 6.7 G/DL (ref 6–8.5)
PROT UR QL STRIP: ABNORMAL
RBC # BLD AUTO: 5.02 X10E6/UL (ref 4.14–5.8)
RBC #/AREA URNS HPF: NORMAL /HPF
SODIUM SERPL-SCNC: 144 MMOL/L (ref 134–144)
SP GR UR: >=1.03 (ref 1–1.03)
TRIGL SERPL-MCNC: 83 MG/DL (ref 0–149)
UROBILINOGEN UR STRIP-MCNC: 0.2 MG/DL (ref 0.2–1)
VLDLC SERPL CALC-MCNC: 17 MG/DL (ref 5–40)
WBC # BLD AUTO: 3.4 X10E3/UL (ref 3.4–10.8)
WBC #/AREA URNS HPF: NORMAL /HPF

## 2017-03-24 RX ORDER — FOSINOPIRL SODIUM 10 MG/1
TABLET ORAL
Qty: 90 TAB | Refills: 2 | Status: SHIPPED | OUTPATIENT
Start: 2017-03-24 | End: 2017-12-19 | Stop reason: SDUPTHER

## 2017-04-11 RX ORDER — METFORMIN HYDROCHLORIDE 500 MG/1
TABLET ORAL
Qty: 360 TAB | Refills: 3 | Status: SHIPPED | COMMUNITY
Start: 2017-04-11 | End: 2018-04-06 | Stop reason: SDUPTHER

## 2017-04-16 DIAGNOSIS — N40.0 BENIGN PROSTATIC HYPERPLASIA WITHOUT LOWER URINARY TRACT SYMPTOMS, UNSPECIFIED MORPHOLOGY: ICD-10-CM

## 2017-04-17 RX ORDER — TAMSULOSIN HYDROCHLORIDE 0.4 MG/1
CAPSULE ORAL
Qty: 90 CAP | Refills: 3 | Status: SHIPPED | OUTPATIENT
Start: 2017-04-17 | End: 2017-09-05 | Stop reason: SDUPTHER

## 2017-05-25 RX ORDER — POTASSIUM CHLORIDE 750 MG/1
TABLET, FILM COATED, EXTENDED RELEASE ORAL
Qty: 270 TAB | Refills: 3 | Status: SHIPPED | COMMUNITY
Start: 2017-05-25 | End: 2019-08-20 | Stop reason: SDUPTHER

## 2017-07-26 RX ORDER — GLIPIZIDE 10 MG/1
TABLET, FILM COATED, EXTENDED RELEASE ORAL
Qty: 90 TAB | Refills: 2 | Status: SHIPPED | COMMUNITY
Start: 2017-07-26 | End: 2018-04-24 | Stop reason: SDUPTHER

## 2017-09-05 ENCOUNTER — OFFICE VISIT (OUTPATIENT)
Dept: INTERNAL MEDICINE CLINIC | Age: 72
End: 2017-09-05

## 2017-09-05 VITALS
DIASTOLIC BLOOD PRESSURE: 80 MMHG | HEART RATE: 73 BPM | RESPIRATION RATE: 18 BRPM | TEMPERATURE: 97.5 F | OXYGEN SATURATION: 95 % | BODY MASS INDEX: 31.49 KG/M2 | WEIGHT: 245.4 LBS | SYSTOLIC BLOOD PRESSURE: 140 MMHG | HEIGHT: 74 IN

## 2017-09-05 DIAGNOSIS — E11.51 TYPE 2 DIABETES, CONTROLLED, WITH PERIPHERAL CIRCULATORY DISORDER (HCC): Primary | ICD-10-CM

## 2017-09-05 DIAGNOSIS — E78.2 MIXED HYPERLIPIDEMIA: ICD-10-CM

## 2017-09-05 DIAGNOSIS — Z12.5 PROSTATE CANCER SCREENING: ICD-10-CM

## 2017-09-05 DIAGNOSIS — Z23 NEED FOR TDAP VACCINATION: ICD-10-CM

## 2017-09-05 DIAGNOSIS — I50.9 CHF, STAGE C (HCC): ICD-10-CM

## 2017-09-05 DIAGNOSIS — Z00.00 MEDICARE ANNUAL WELLNESS VISIT, SUBSEQUENT: ICD-10-CM

## 2017-09-05 DIAGNOSIS — Z13.39 SCREENING FOR ALCOHOLISM: ICD-10-CM

## 2017-09-05 DIAGNOSIS — I25.10 ATHEROSCLEROSIS OF NATIVE CORONARY ARTERY OF NATIVE HEART WITHOUT ANGINA PECTORIS: ICD-10-CM

## 2017-09-05 DIAGNOSIS — Z13.31 SCREENING FOR DEPRESSION: ICD-10-CM

## 2017-09-05 NOTE — MR AVS SNAPSHOT
Visit Information Date & Time Provider Department Dept. Phone Encounter #  
 9/5/2017 11:00 AM Marga Wiggins, [address] [facility] Internal Medicine [id number] Follow-up Instructions Return in about 6 months (around 3/5/2018), or if symptoms worsen or fail to improve. Upcoming Health Maintenance Date Due DTaP/Tdap/Td series (1 - Tdap) 1/24/1967 INFLUENZA AGE 9 TO ADULT 8/1/2017 HEMOGLOBIN A1C Q6M 9/7/2017 EYE EXAM RETINAL OR DILATED Q1 2/7/2018 MICROALBUMIN Q1 3/7/2018 LIPID PANEL Q1 3/7/2018 FOOT EXAM Q1 6/1/2018 MEDICARE YEARLY EXAM 9/6/2018 GLAUCOMA SCREENING Q2Y 2/7/2019 COLONOSCOPY 4/19/2026 Allergies as of 9/5/2017  Review Complete On: 9/5/2017 By: Marga Wiggins MD  
  
 Severity Noted Reaction Type Reactions Pcn [Penicillins]  03/17/2010    Hives Current Immunizations  Reviewed on 9/5/2017 Name Date Influenza High Dose Vaccine PF 10/1/2016 Influenza Vaccine Whole 10/11/2010 Pneumococcal Conjugate (PCV-13) 1/1/2015 Pneumococcal Polysaccharide (PPSV-23) 12/5/2012 ZZZ-RETIRED (DO NOT USE) Pneumococcal Vaccine (Unspecified Type) 1/1/2002 Zoster 10/10/2010 Reviewed by Gm Woodward on 9/5/2017 at 11:14 AM  
 Reviewed by Marga Wiggins MD on 9/5/2017 at 11:54 AM  
You Were Diagnosed With   
  
 Codes Comments Type 2 diabetes, controlled, with peripheral circulatory disorder (HCC)    -  Primary ICD-10-CM: E11.51 
ICD-9-CM: 250.70, 443.81 Need for Tdap vaccination     ICD-10-CM: D07 ICD-9-CM: V06.1 Prostate cancer screening     ICD-10-CM: Z12.5 ICD-9-CM: V76.44   
 CHF, stage C ([facility] [address]     ICD-10-CM: I50.9 ICD-9-CM: 428.0 Atherosclerosis of native coronary artery of native heart without angina pectoris     ICD-10-CM: I25.10 ICD-9-CM: 414.01 Mixed hyperlipidemia     ICD-10-CM: E78.2 ICD-9-CM: 272.2 Medicare annual wellness visit, subsequent     ICD-10-CM: Z00.00 ICD-9-CM: V70.0 Screening for alcoholism     ICD-10-CM: Z13.89 ICD-9-CM: V79.1 Screening for depression     ICD-10-CM: Z13.89 ICD-9-CM: V79.0 Vitals BP Pulse Temp Resp Height(growth percentile) Weight(growth percentile) 140/80 (BP 1 Location: Right arm, BP Patient Position: Sitting) 73 97.5 °F (36.4 °C) (Oral) 18 6' 1.5\" (1.867 m) 245 lb 6.4 oz (111.3 kg) SpO2 BMI Smoking Status 95% 31.94 kg/m2 Never Smoker BMI and BSA Data Body Mass Index Body Surface Area 31.94 kg/m 2 2.4 m 2 Preferred Pharmacy Pharmacy Name [address] [facility] [phone] Your Updated Medication List  
  
   
This list is accurate as of: 9/5/17 12:06 PM.  Always use your most recent med list.  
  
  
  
  
 aspirin delayed-release 81 mg tablet Take 81 mg by mouth daily. azithromycin 500 mg Tab Commonly known as:  Saralee Anis Take 2 , 1 hour before dental procedure  
  
 carvedilol 6.25 mg tablet Commonly known as:  COREG  
TAKE 1 TABLET TWICE A DAY WITH MEALS  
  
 chlorpheniramine-HYDROcodone 10-8 mg/5 mL suspension Commonly known as:  Christia Regulus Take 5 mL by mouth nightly as needed for Cough. Max Daily Amount: 5 mL. CRESTOR 10 mg tablet Generic drug:  rosuvastatin Take 10 mg by mouth nightly. Diphth, Pertus(Acell), Tetanus 2.5-8-5 Lf-mcg-Lf/0.5mL Susp susp Commonly known as:  BOOSTRIX TDAP  
0.5 mL by IntraMUSCular route once for 1 dose. EFFIENT 10 mg tablet Generic drug:  prasugrel TAKE 1 TABLET DAILY fosinopril 10 mg tablet Commonly known as:  MONOPRIL  
TAKE 1 TABLET DAILY  
  
 furosemide 20 mg tablet Commonly known as:  LASIX TAKE 1 TABLET DAILY  
  
 glipiZIDE SR 10 mg CR tablet Commonly known as:  GLUCOTROL XL  
TAKE 1 TABLET DAILY  
  
 glucose blood VI test strips strip Commonly known as:  blood glucose test  
 [employer] test strips. Dx .00  
  
 metFORMIN 500 mg tablet Commonly known as:  GLUCOPHAGE  
TAKE 2 TABLETS TWICE A DAY WITH MEALS  
  
 nitroglycerin 0.4 mg SL tablet Commonly known as:  NITROSTAT  
1 tablet by SubLINGual route every five (5) minutes as needed for Chest Pain. omeprazole 40 mg capsule Commonly known as:  PRILOSEC Take 1 capsule by mouth daily as needed. potassium chloride SR 10 mEq tablet Commonly known as:  KLOR-CON 10  
TAKE 3 TABLETS DAILY  
  
 spironolactone 25 mg tablet Commonly known as:  ALDACTONE  
TAKE 1 TABLET DAILY  
  
 tamsulosin 0.4 mg capsule Commonly known as:  FLOMAX Take 1 Cap by mouth daily. TYLENOL EXTRA STRENGTH 500 mg tablet Generic drug:  acetaminophen Take  by mouth every six (6) hours as needed. Prescriptions Printed Refills Naomi Lien,, Tetanus (BOOSTRIX TDAP) 2.5-8-5 Lf-mcg-Lf/0.5mL susp susp 0 Si.5 mL by IntraMUSCular route once for 1 dose. Class: Print Route: IntraMUSCular We Performed the Following CBC WITH AUTOMATED DIFF [95556 CPT(R)] [address] [[id number] HCPCS] HEMOGLOBIN A1C WITH EAG [55869 CPT(R)] HEMOGLOBIN A1C WITH EAG [77968 CPT(R)] LIPID PANEL [86564 CPT(R)] METABOLIC PANEL, COMPREHENSIVE [72282 CPT(R)] MI ANNUAL ALCOHOL SCREEN 15 MIN [id number] HCP] PSA SCREENING (SCREENING) [ HCPCS] TSH 3RD GENERATION [43577 CPT(R)] URINALYSIS W/ RFLX MICROSCOPIC [81824 CPT(R)] Follow-up Instructions Return in about 6 months (around 3/5/2018), or if symptoms worsen or fail to improve. Patient Instructions Medicare Wellness Visit, Male The best way to live healthy is to have a healthy lifestyle by eating a well-balanced diet, exercising regularly, limiting alcohol and stopping smoking. Regular physical exams and screening tests are another way to keep healthy. Preventive exams provided by your health care provider can find health problems before they become diseases or illnesses. Preventive services including immunizations, screening tests, monitoring and exams can help you take care of your own health. All people over age 72 should have a pneumovax  and and a prevnar shot to prevent pneumonia. These are once in a lifetime unless you and your provider decide differently. All people over 65 should have a yearly flu shot and a tetanus vaccine every 10 years. Screening for diabetes mellitus with a blood sugar test should be done every year. Glaucoma is a disease of the eye due to increased ocular pressure that can lead to blindness and it should be done every year by an eye professional. 
 
Cardiovascular screening tests that check for elevated lipids (fatty part of blood) which can lead to heart disease and strokes should be done every 5 years. Colorectal screening that evaluates for blood or polyps in your colon should be done yearly as a stool test or every five years as a flexible sigmoidoscope or every 10 years as a colonoscopy up to age 76. Men up to age 76 may need a screening blood test for prostate cancer at certain intervals, depending on their personal and family history. This decision is between the patient and his provider. If you have been a smoker or had family history of abdominal aortic aneurysms, you and your provider may decide to schedule an ultrasound test of your aorta. Hepatitis C screening is also recommended for anyone born between [birth year] through [address]. A shingles vaccine is also recommended once in a lifetime after age 61. Your Medicare Wellness Exam is recommended annually. Here is a list of your current Health Maintenance items with a due date: 
Health Maintenance Due Topic Date Due  
 DTaP/Tdap/Td  (1 - Tdap) 01/24/1967  Flu Vaccine  08/01/2017  Hemoglobin A1C    09/07/2017 Introducing [facility]! Dear Nehemias Alba: Thank you for requesting a [employer] account. Our records indicate that you have previously registered for a [employer] account but its currently inactive. Please call our [employer] support line at [phone]. Additional Information If you have questions, please visit the Frequently Asked Questions section of the [employer] website at [email or web address] Remember, [employer] is NOT to be used for urgent needs. For medical emergencies, dial 911. Now available from your iPhone and Android! Please provide this summary of care documentation to your next provider. Your primary care clinician is listed as LOCO [facility]. If you have any questions after today's visit, please call [phone].

## 2017-09-05 NOTE — PROGRESS NOTES
HISTORY OF PRESENT ILLNESS  Jv Link is a 71 y.o. male. HPI Vik Young is seen today for a Medicare Wellness Visit, complete physical examination, and follow up of chronic problems. Medicare Wellness Visit. Please see attached note. Preventive Medicine. Fully reviewed with him today. He is due for a complete physical examination, routine labs and a Tdap booster. He is up to date with other vaccinations, baseline EKG, and a colonoscopy. Chronic medical problems are reviewed. 1. Diabetes, hyperlipidemia. Due for routine labs. 2. Hypertension. Stable on current regimen. 3. CAD, CHF. Clinically stable and up to date with cardiology follow up. 4. Diabetic foot exam is performed at the [address] [place] [address] where he also receives primary and specialist care. MedDATA/gwo                                                                                                                                                                                                                                                                                                                                                                       Current Outpatient Prescriptions   Medication Sig    glipiZIDE SR (GLUCOTROL XL) 10 mg CR tablet TAKE 1 TABLET DAILY    potassium chloride SR (KLOR-CON 10) 10 mEq tablet TAKE 3 TABLETS DAILY    metFORMIN (GLUCOPHAGE) 500 mg tablet TAKE 2 TABLETS TWICE A DAY WITH MEALS    fosinopril (MONOPRIL) 10 mg tablet TAKE 1 TABLET DAILY    chlorpheniramine-HYDROcodone (TUSSIONEX) 10-8 mg/5 mL suspension Take 5 mL by mouth nightly as needed for Cough. Max Daily Amount: 5 mL.  furosemide (LASIX) 20 mg tablet TAKE 1 TABLET DAILY    EFFIENT 10 mg tablet TAKE 1 TABLET DAILY    spironolactone (ALDACTONE) 25 mg tablet TAKE 1 TABLET DAILY    carvedilol (COREG) 6.25 mg tablet TAKE 1 TABLET TWICE A DAY WITH MEALS    tamsulosin (FLOMAX) 0.4 mg capsule Take 1 Cap by mouth daily.     nitroglycerin (NITROSTAT) 0.4 mg SL tablet 1 tablet by SubLINGual route every five (5) minutes as needed for Chest Pain.  azithromycin (ZITHROMAX) 500 mg tablet Take 2 , 1 hour before dental procedure    omeprazole (PRILOSEC) 40 mg capsule Take 1 capsule by mouth daily as needed.  rosuvastatin (CRESTOR) 10 mg tablet Take 10 mg by mouth nightly.  glucose blood VI test strips (BLOOD GLUCOSE TEST) strip [employer] brand test strips. Dx .00    acetaminophen (TYLENOL EXTRA STRENGTH) 500 mg tablet Take  by mouth every six (6) hours as needed.  aspirin delayed-release 81 mg tablet Take 81 mg by mouth daily. No current facility-administered medications for this visit. Review of Systems   Constitutional: Negative for weight loss. Respiratory: Negative. Cardiovascular: Negative for chest pain, palpitations, leg swelling and PND. Gastrointestinal: Negative. Genitourinary: Negative. Musculoskeletal: Positive for myalgias. Neurological: Negative for focal weakness. Physical Exam   Constitutional: He is oriented to person, place, and time. He appears well-developed and well-nourished. No distress. HENT:   Head: Normocephalic and atraumatic. Right Ear: Tympanic membrane, external ear and ear canal normal.   Left Ear: Tympanic membrane, external ear and ear canal normal.   Eyes: EOM are normal. Pupils are equal, round, and reactive to light. Right eye exhibits no discharge. Left eye exhibits no discharge. Neck: Normal range of motion. Neck supple. Carotid bruit is not present. No thyromegaly present. Cardiovascular: Normal rate, regular rhythm, normal heart sounds and intact distal pulses. Exam reveals no gallop and no friction rub. No murmur heard. Pulses:       Dorsalis pedis pulses are 1+ on the right side, and 1+ on the left side. Posterior tibial pulses are 1+ on the right side, and 1+ on the left side.    Pulmonary/Chest: Effort normal and breath sounds normal. No respiratory distress. He has no wheezes. He has no rales. Abdominal: Soft. Bowel sounds are normal. He exhibits no distension and no mass. There is no tenderness. There is no rebound and no guarding. Genitourinary: Rectum normal. Rectal exam shows no mass and no tenderness. Prostate is enlarged. Prostate is not tender. Musculoskeletal: Normal range of motion. He exhibits no edema or tenderness. Lymphadenopathy:     He has no cervical adenopathy. Neurological: He is alert and oriented to person, place, and time. He has normal reflexes. Skin: Skin is warm and dry. No rash noted. Psychiatric: He has a normal mood and affect. His behavior is normal.   Nursing note and vitals reviewed. ASSESSMENT and PLAN  Diagnoses and all orders for this visit:    1. Type 2 diabetes, controlled, with peripheral circulatory disorder (HCC)  -     HEMOGLOBIN A1C WITH EAG  -     URINALYSIS W/ RFLX MICROSCOPIC  -     HEMOGLOBIN A1C WITH EAG    2. Need for Tdap vaccination  -     Reeda [employer],, Tetanus (BOOSTRIX TDAP) 2.5-8-5 Lf-mcg-Lf/0.5mL susp susp; 0.5 mL by IntraMUSCular route once for 1 dose. 3. Prostate cancer screening  -     PSA SCREENING (SCREENING)    4. CHF, stage C [facility])- See cardiologist as directed. 5. Atherosclerosis of native coronary artery of native heart without angina pectoris- See cardiologist as directed. 6. Mixed hyperlipidemia  -     LIPID PANEL  -     METABOLIC PANEL, COMPREHENSIVE  -     CBC WITH AUTOMATED DIFF  -     TSH 3RD GENERATION    7. Medicare annual wellness visit, subsequent    8. Screening for alcoholism  -     Annual  Alcohol Screen 15 min ()    9. Screening for depression  -     Depression Screen Annual        This is a Subsequent Medicare Annual Wellness Exam (AWV) (Performed 12 months after IPPE or effective date of Medicare Part B enrollment, Once in a lifetime)    I have reviewed the patient's medical history in detail and updated the computerized patient record. History     Past Medical History:   Diagnosis Date    CAD (coronary artery disease) '90 '97, '13 x 2    MI, code ice in 2013 at [facility]    Calculus of kidney     Colonic polyps     Congestive heart failure, unspecified     Diabetes ([facility] [address]     Gastritis     Hypercholesterolemia     Sleep apnea       Past Surgical History:   Procedure Laterality Date    CARDIAC SURG PROCEDURE UNLIST  2012    x 4 vessels    COLONOSCOPY  04/06/2011    16, due 21    ENDOSCOPY, COLON, DIAGNOSTIC      11, due 16    HX CORONARY ARTERY BYPASS GRAFT  8/22/12    x 4 vessel by MISSY Ramirez    HX DILLANENT      LASIK    HX PACEMAKER PLACEMENT  1/30/13     Current Outpatient Prescriptions   Medication Sig Dispense Refill    Diphth, Pertus,Acell,, Tetanus (BOOSTRIX TDAP) 2.5-8-5 Lf-mcg-Lf/0.5mL susp susp 0.5 mL by IntraMUSCular route once for 1 dose. 0.5 mL 0    glipiZIDE SR (GLUCOTROL XL) 10 mg CR tablet TAKE 1 TABLET DAILY 90 Tab 2    potassium chloride SR (KLOR-CON 10) 10 mEq tablet TAKE 3 TABLETS DAILY 270 Tab 3    metFORMIN (GLUCOPHAGE) 500 mg tablet TAKE 2 TABLETS TWICE A DAY WITH MEALS 360 Tab 3    fosinopril (MONOPRIL) 10 mg tablet TAKE 1 TABLET DAILY 90 Tab 2    chlorpheniramine-HYDROcodone (TUSSIONEX) 10-8 mg/5 mL suspension Take 5 mL by mouth nightly as needed for Cough. Max Daily Amount: 5 mL. 115 mL 0    furosemide (LASIX) 20 mg tablet TAKE 1 TABLET DAILY 90 Tab 2    EFFIENT 10 mg tablet TAKE 1 TABLET DAILY 90 Tab 2    spironolactone (ALDACTONE) 25 mg tablet TAKE 1 TABLET DAILY 90 Tab 3    carvedilol (COREG) 6.25 mg tablet TAKE 1 TABLET TWICE A DAY WITH MEALS 180 Tab 3    tamsulosin (FLOMAX) 0.4 mg capsule Take 1 Cap by mouth daily. 15 Cap 4    nitroglycerin (NITROSTAT) 0.4 mg SL tablet 1 tablet by SubLINGual route every five (5) minutes as needed for Chest Pain.  25 tablet 99    azithromycin (ZITHROMAX) 500 mg tablet Take 2 , 1 hour before dental procedure 2 tablet 5    omeprazole (PRILOSEC) 40 mg capsule Take 1 capsule by mouth daily as needed. 1 capsule 0    rosuvastatin (CRESTOR) 10 mg tablet Take 10 mg by mouth nightly.  glucose blood VI test strips (BLOOD GLUCOSE TEST) strip [employer] brand test strips. Dx .00 1 Package 11    acetaminophen (TYLENOL EXTRA STRENGTH) 500 mg tablet Take  by mouth every six (6) hours as needed.  aspirin delayed-release 81 mg tablet Take 81 mg by mouth daily. Allergies   Allergen Reactions    Pcn [Penicillins] Hives     Family History   Problem Relation Age of Onset    Heart Disease Mother      MI    Heart Disease Father      CAD & PVD    Lung Disease Father     Cancer Father      lung    Diabetes Maternal Grandmother     Heart Disease Other      CAD    Stroke Sister      Social History   Substance Use Topics    Smoking status: Never Smoker    Smokeless tobacco: Never Used    Alcohol use 0.0 oz/week     0 Standard drinks or equivalent per week      Comment:  VERY RARE     Patient Active Problem List   Diagnosis Code    Calculus of kidney N20.0    Coronary atherosclerosis of native coronary artery I25.10    Benign neoplasm of colon D12.6    Unspecified sleep apnea G47.30    Reflux esophagitis K21.0    Encounter for long-term (current) use of other medications Z79.899    Insomnia, unspecified G47.00    Cardiac arrest ([facility] [address] I46.9    Hematuria, gross R31.0    BPH without obstruction/lower urinary tract symptoms N40.0    Proteinuria R80.9    CHF, stage C ([address] I50.9    Mixed hyperlipidemia E78.2    Type 2 diabetes, controlled, with peripheral circulatory disorder (HCC) E11.51    Active advance directive on file Z78.9       Depression Risk Factor Screening:     PHQ over the last two weeks 9/5/2017   Little interest or pleasure in doing things Not at all   Feeling down, depressed or hopeless Not at all   Total Score PHQ 2 0     Alcohol Risk Factor Screening: You do not drink alcohol or very rarely.       Functional Ability and Level of Safety: Hearing Loss  Hearing is good. Activities of Daily Living  The home contains: no safety equipment  Patient does total self care    Fall RiskFall Risk Assessment, last 12 mths 9/5/2017   Able to walk? Yes   Fall in past 12 months? Yes   Fall with injury? No   Number of falls in past 12 months 2   Fall Risk Score 2       Abuse Screen  Patient is not abused    Cognitive Screening   Evaluation of Cognitive Function:  Has your family/caregiver stated any concerns about your memory: yes  Normal    Patient Care Team   Patient Care Team:  Linell Gottron, MD as PCP - Sonali Tabor MD as Physician (Cardiology)  Sanjeev Kim MD as Physician (Cardiology)  Gina Jiménez MD as Physician (Gastroenterology)  Chris Gayle MD as Physician (Orthopedic Surgery)  Gayland Alpers, MD as Physician (Ophthalmology)  Welford Gilbert Wilms, MD as Physician (General Practice)    Assessment/Plan   Education and counseling provided:  Are appropriate based on today's review and evaluation    Diagnoses and all orders for this visit:    1. Type 2 diabetes, controlled, with peripheral circulatory disorder (HCC)  -     HEMOGLOBIN A1C WITH EAG  -     URINALYSIS W/ RFLX MICROSCOPIC  -     HEMOGLOBIN A1C WITH EAG    2. Need for Tdap vaccination  -     Radha Lopezamo,, Tetanus (BOOSTRIX TDAP) 2.5-8-5 Lf-mcg-Lf/0.5mL susp susp; 0.5 mL by IntraMUSCular route once for 1 dose. 3. Prostate cancer screening  -     PSA SCREENING (SCREENING)    4. CHF, stage C ([facility] [address]    5. Atherosclerosis of native coronary artery of native heart without angina pectoris    6.  Mixed hyperlipidemia  -     LIPID PANEL  -     METABOLIC PANEL, COMPREHENSIVE  -     CBC WITH AUTOMATED DIFF  -     TSH 3RD GENERATION        Health Maintenance Due   Topic Date Due    DTaP/Tdap/Td series (1 - Tdap) 01/24/1967    INFLUENZA AGE 9 TO ADULT  08/01/2017    HEMOGLOBIN A1C Q6M  09/07/2017

## 2017-09-05 NOTE — PATIENT INSTRUCTIONS

## 2017-10-08 RX ORDER — PRASUGREL 10 MG/1
TABLET, FILM COATED ORAL
Qty: 90 TAB | Refills: 2 | Status: SHIPPED | OUTPATIENT
Start: 2017-10-08 | End: 2018-07-05 | Stop reason: SDUPTHER

## 2017-10-18 ENCOUNTER — OFFICE VISIT (OUTPATIENT)
Dept: INTERNAL MEDICINE CLINIC | Age: 72
End: 2017-10-18

## 2017-10-18 VITALS
HEART RATE: 58 BPM | BODY MASS INDEX: 30.8 KG/M2 | HEIGHT: 74 IN | RESPIRATION RATE: 20 BRPM | DIASTOLIC BLOOD PRESSURE: 84 MMHG | OXYGEN SATURATION: 99 % | SYSTOLIC BLOOD PRESSURE: 138 MMHG | WEIGHT: 240 LBS | TEMPERATURE: 97.6 F

## 2017-10-18 DIAGNOSIS — J20.8 ACUTE BRONCHITIS DUE TO OTHER SPECIFIED ORGANISMS: Primary | ICD-10-CM

## 2017-10-18 RX ORDER — DOXYCYCLINE 100 MG/1
100 TABLET ORAL 2 TIMES DAILY
Qty: 14 TAB | Refills: 0 | Status: SHIPPED | OUTPATIENT
Start: 2017-10-18 | End: 2017-10-25

## 2017-10-18 RX ORDER — HYDROCODONE POLISTIREX AND CHLORPHENIRAMINE POLISTIREX 10; 8 MG/5ML; MG/5ML
1 SUSPENSION, EXTENDED RELEASE ORAL
Qty: 115 ML | Refills: 0 | Status: SHIPPED | OUTPATIENT
Start: 2017-10-18 | End: 2018-08-08 | Stop reason: ALTCHOICE

## 2017-10-18 NOTE — PROGRESS NOTES
HISTORY OF PRESENT ILLNESS  Kelly English is a 71 y.o. male. Cold Symptoms   The current episode started more than 1 week ago. The problem has been gradually worsening (improved then worsened). The cough is productive of purulent sputum. There has been no fever. Associated symptoms include sore throat. Pertinent negatives include no chest pain, no chills, no ear congestion, no ear pain, no rhinorrhea, no myalgias, no shortness of breath, no nausea and no vomiting. Treatments tried: coricidin  The treatment provided mild relief. He is not a smoker. His past medical history is significant for bronchitis. Granddaughter with similar symptoms    Review of Systems   Constitutional: Negative for chills. HENT: Positive for sore throat. Negative for ear pain and rhinorrhea. Respiratory: Positive for cough. Negative for shortness of breath. Cardiovascular: Negative for chest pain. Gastrointestinal: Negative for nausea and vomiting. Musculoskeletal: Negative for myalgias. Patient Active Problem List    Diagnosis Date Noted    Active advance directive on file 03/07/2017    Type 2 diabetes, controlled, with peripheral circulatory disorder ([facility] [facility] 02/10/2016    CHF, stage C ([facility] [address] [facility] 08/06/2015    Mixed hyperlipidemia 08/06/2015    Proteinuria 06/19/2014    BPH without obstruction/lower urinary tract symptoms 06/11/2014    Hematuria, gross 02/19/2013    Cardiac arrest ([facility] [address] [facility] 02/04/2013    Insomnia, unspecified 07/23/2012    Encounter for long-term (current) use of other medications 03/26/2012    Calculus of kidney 03/22/2010    Coronary atherosclerosis of native coronary artery 03/22/2010    Benign neoplasm of colon 03/22/2010    Unspecified sleep apnea 03/22/2010    Reflux esophagitis 03/22/2010       Current Outpatient Prescriptions   Medication Sig Dispense Refill    doxycycline (ADOXA) 100 mg tablet Take 1 Tab by mouth two (2) times a day for 7 days.  14 Tab 0    chlorpheniramine-HYDROcodone (TUSSIONEX) 10-8 mg/5 mL suspension Take 5 mL by mouth every twelve (12) hours as needed for Cough. Max Daily Amount: 10 mL. 115 mL 0    prasugrel (EFFIENT) 10 mg tablet TAKE 1 TABLET DAILY 90 Tab 2    glipiZIDE SR (GLUCOTROL XL) 10 mg CR tablet TAKE 1 TABLET DAILY 90 Tab 2    potassium chloride SR (KLOR-CON 10) 10 mEq tablet TAKE 3 TABLETS DAILY 270 Tab 3    metFORMIN (GLUCOPHAGE) 500 mg tablet TAKE 2 TABLETS TWICE A DAY WITH MEALS 360 Tab 3    fosinopril (MONOPRIL) 10 mg tablet TAKE 1 TABLET DAILY 90 Tab 2    furosemide (LASIX) 20 mg tablet TAKE 1 TABLET DAILY 90 Tab 2    spironolactone (ALDACTONE) 25 mg tablet TAKE 1 TABLET DAILY 90 Tab 3    carvedilol (COREG) 6.25 mg tablet TAKE 1 TABLET TWICE A DAY WITH MEALS 180 Tab 3    tamsulosin (FLOMAX) 0.4 mg capsule Take 1 Cap by mouth daily. 15 Cap 4    omeprazole (PRILOSEC) 40 mg capsule Take 1 capsule by mouth daily as needed. 1 capsule 0    rosuvastatin (CRESTOR) 10 mg tablet Take 10 mg by mouth nightly.  glucose blood VI test strips (BLOOD GLUCOSE TEST) strip 3nder test strips. Dx .00 1 Package 11    acetaminophen (TYLENOL EXTRA STRENGTH) 500 mg tablet Take  by mouth every six (6) hours as needed.  aspirin delayed-release 81 mg tablet Take 81 mg by mouth daily. Allergies   Allergen Reactions    Pcn [Penicillins] Hives      Visit Vitals    /84 (BP 1 Location: Right arm, BP Patient Position: Sitting)    Pulse (!) 58    Temp 97.6 °F (36.4 °C) (Oral)    Resp 20    Ht 6' 1.5\" (1.867 m)    Wt 240 lb (108.9 kg)    SpO2 99%    BMI 31.23 kg/m2       Physical Exam   Constitutional: He is oriented to person, place, and time. No distress. HENT:   Right Ear: Tympanic membrane, external ear and ear canal normal.   Left Ear: Tympanic membrane, external ear and ear canal normal.   Nose: Mucosal edema and rhinorrhea present. Mouth/Throat: Posterior oropharyngeal erythema present. No oropharyngeal exudate. Cardiovascular: Normal rate, regular rhythm and normal heart sounds. Exam reveals no friction rub. No murmur heard. Pulmonary/Chest: No accessory muscle usage. No tachypnea and no bradypnea. No respiratory distress. He has no decreased breath sounds. He has no wheezes. He has no rhonchi. He has rales. Coarse BS Left side    Musculoskeletal: He exhibits no edema. Neurological: He is alert and oriented to person, place, and time. Skin: He is not diaphoretic. ASSESSMENT and PLAN  Diagnoses and all orders for this visit:    1. Acute bronchitis due to other specified organisms- Bacterial based on s/s. [facility]  reviewed  Red flags to warrant ER or earlier clinical evaluation reviewed. See AVS for full details of plan and patient discussion.     -     doxycycline (ADOXA) 100 mg tablet; Take 1 Tab by mouth two (2) times a day for 7 days. -     chlorpheniramine-HYDROcodone (TUSSIONEX) 10-8 mg/5 mL suspension; Take 5 mL by mouth every twelve (12) hours as needed for Cough. Max Daily Amount: 10 mL. Follow-up Disposition:  Return if symptoms worsen or fail to improve after antibiotics. Medication risks/benefits/costs/interactions/alternatives discussed with patient. Gayla Barakat  was given an after visit summary which includes diagnoses, current medications, & vitals. he expressed understanding with the diagnosis and plan.

## 2017-10-18 NOTE — PATIENT INSTRUCTIONS
It was a pleasure to see you! As discussed:       Bronchitis: Care Instructions  Your Care Instructions    Bronchitis is inflammation of the bronchial tubes, which carry air to the lungs. The tubes swell and produce mucus, or phlegm. The mucus and inflamed bronchial tubes make you cough. You may have trouble breathing. Most cases of bronchitis are caused by viruses like those that cause colds. Antibiotics usually do not help and they may be harmful. Bronchitis usually develops rapidly and lasts about 2 to 3 weeks in otherwise healthy people. Follow-up care is a key part of your treatment and safety. Be sure to make and go to all appointments, and call your doctor if you are having problems. It's also a good idea to know your test results and keep a list of the medicines you take. How can you care for yourself at home? · Take all medicines exactly as prescribed. Call your doctor if you think you are having a problem with your medicine. · Get some extra rest.  · Take an over-the-counter pain medicine, such as acetaminophen (Tylenol), ibuprofen (Advil, Motrin), or naproxen (Aleve) to reduce fever and relieve body aches. Read and follow all instructions on the label. · Do not take two or more pain medicines at the same time unless the doctor told you to. Many pain medicines have acetaminophen, which is Tylenol. Too much acetaminophen (Tylenol) can be harmful. · Take an over-the-counter cough medicine that contains dextromethorphan to help quiet a dry, hacking cough so that you can sleep. Avoid cough medicines that have more than one active ingredient. Read and follow all instructions on the label. · Breathe moist air from a humidifier, hot shower, or sink filled with hot water. The heat and moisture will thin mucus so you can cough it out. · Do not smoke. Smoking can make bronchitis worse. If you need help quitting, talk to your doctor about stop-smoking programs and medicines.  These can increase your chances of quitting for good. When should you call for help? Call 911 anytime you think you may need emergency care. For example, call if:  · You have severe trouble breathing. Call your doctor now or seek immediate medical care if:  · You have new or worse trouble breathing. · You cough up dark brown or bloody mucus (sputum). · You have a new or higher fever. · You have a new rash. Watch closely for changes in your health, and be sure to contact your doctor if:  · You cough more deeply or more often, especially if you notice more mucus or a change in the color of your mucus. · You are not getting better as expected. Where can you learn more? Go to http://vanna-byron.info/. Enter H333 in the search box to learn more about \"Bronchitis: Care Instructions. \"  Current as of: March 25, 2017  Content Version: 11.3  © 1274-5927 [employer]. Care instructions adapted under license by [employer] (which disclaims liability or warranty for this information). If you have questions about a medical condition or this instruction, always ask your healthcare professional. [place] [address] any warranty or liability for your use of this information.

## 2017-10-18 NOTE — MR AVS SNAPSHOT
Visit Information Date & Time Provider Department Dept. Phone Encounter #  
 10/18/2017 10:45 AM Peter Ramos MD [facility] Internal Medicine [phone] [id number] Follow-up Instructions Return if symptoms worsen or fail to improve after antibiotics. Upcoming Health Maintenance Date Due DTaP/Tdap/Td series (1 - Tdap) 1/24/1966 INFLUENZA AGE 9 TO ADULT 8/1/2017 HEMOGLOBIN A1C Q6M 9/7/2017 EYE EXAM RETINAL OR DILATED Q1 2/7/2018 MICROALBUMIN Q1 3/7/2018 LIPID PANEL Q1 3/7/2018 FOOT EXAM Q1 6/1/2018 MEDICARE YEARLY EXAM 9/6/2018 GLAUCOMA SCREENING Q2Y 2/7/2019 COLONOSCOPY 4/19/2026 Allergies as of 10/18/2017  Review Complete On: 10/18/2017 By: Yosi Henao LPN Severity Noted Reaction Type Reactions Pcn [Penicillins]  03/17/2010    Hives Current Immunizations  Reviewed on 9/5/2017 Name Date Influenza High Dose Vaccine PF 10/1/2016 Influenza Vaccine Whole 10/11/2010 Pneumococcal Conjugate (PCV-13) 1/1/2015 Pneumococcal Polysaccharide (PPSV-23) 12/5/2012 ZZZ-RETIRED (DO NOT USE) Pneumococcal Vaccine (Unspecified Type) 1/1/2002 Zoster 10/10/2010 Not reviewed this visit You Were Diagnosed With   
  
 Codes Comments Acute bronchitis due to other specified organisms    -  Primary ICD-10-CM: J20.8 ICD-9-CM: 466.0 Vitals BP Pulse Temp Resp Height(growth percentile) Weight(growth percentile) 138/84 (BP 1 Location: Right arm, BP Patient Position: Sitting) (!) 58 97.6 °F (36.4 °C) (Oral) 20 6' 1.5\" (1.867 m) 240 lb (108.9 kg) SpO2 BMI Smoking Status 99% 31.23 kg/m2 Never Smoker Vitals History BMI and BSA Data Body Mass Index Body Surface Area  
 31.23 kg/m 2 2.38 m 2 Preferred Pharmacy Pharmacy Name Phone [address] [facility] [facility] [phone] Your Updated Medication List  
  
   
This list is accurate as of: 10/18/17 11:37 AM.  Always use your most recent med list.  
  
  
  
  
 aspirin delayed-release 81 mg tablet Take 81 mg by mouth daily. carvedilol 6.25 mg tablet Commonly known as:  COREG  
TAKE 1 TABLET TWICE A DAY WITH MEALS  
  
 chlorpheniramine-HYDROcodone 10-8 mg/5 mL suspension Commonly known as:  Elia Sor Take 5 mL by mouth every twelve (12) hours as needed for Cough. Max Daily Amount: 10 mL. CRESTOR 10 mg tablet Generic drug:  rosuvastatin Take 10 mg by mouth nightly. doxycycline 100 mg tablet Commonly known as:  ADOXA Take 1 Tab by mouth two (2) times a day for 7 days. fosinopril 10 mg tablet Commonly known as:  MONOPRIL  
TAKE 1 TABLET DAILY  
  
 furosemide 20 mg tablet Commonly known as:  LASIX TAKE 1 TABLET DAILY  
  
 glipiZIDE SR 10 mg CR tablet Commonly known as:  GLUCOTROL XL  
TAKE 1 TABLET DAILY  
  
 glucose blood VI test strips strip Commonly known as:  blood glucose test  
[employer] brand test strips. Dx .00  
  
 metFORMIN 500 mg tablet Commonly known as:  GLUCOPHAGE  
TAKE 2 TABLETS TWICE A DAY WITH MEALS  
  
 omeprazole 40 mg capsule Commonly known as:  PRILOSEC Take 1 capsule by mouth daily as needed. potassium chloride SR 10 mEq tablet Commonly known as:  KLOR-CON 10  
TAKE 3 TABLETS DAILY  
  
 prasugrel 10 mg tablet Commonly known as:  EFFIENT TAKE 1 TABLET DAILY  
  
 spironolactone 25 mg tablet Commonly known as:  ALDACTONE  
TAKE 1 TABLET DAILY  
  
 tamsulosin 0.4 mg capsule Commonly known as:  FLOMAX Take 1 Cap by mouth daily. TYLENOL EXTRA STRENGTH 500 mg tablet Generic drug:  acetaminophen Take  by mouth every six (6) hours as needed. Prescriptions Printed  Refills  
 chlorpheniramine-HYDROcodone (TUSSIONEX) 10-8 mg/5 mL suspension 0  
 Sig: Take 5 mL by mouth every twelve (12) hours as needed for Cough. Max Daily Amount: 10 mL. Class: Print Route: Oral  
  
Prescriptions Sent to Pharmacy Refills  
 doxycycline (ADOXA) 100 mg tablet 0 Sig: Take 1 Tab by mouth two (2) times a day for 7 days. Class: Normal  
 Pharmacy: [facility] [address] [facility] [address] [facility] #: [phone] Route: Oral  
  
Follow-up Instructions Return if symptoms worsen or fail to improve after antibiotics. Patient Instructions It was a pleasure to see you! As discussed: 
 
  
Bronchitis: Care Instructions Your Care Instructions Bronchitis is inflammation of the bronchial tubes, which carry air to the lungs. The tubes swell and produce mucus, or phlegm. The mucus and inflamed bronchial tubes make you cough. You may have trouble breathing. Most cases of bronchitis are caused by viruses like those that cause colds. Antibiotics usually do not help and they may be harmful. Bronchitis usually develops rapidly and lasts about 2 to 3 weeks in otherwise healthy people. Follow-up care is a key part of your treatment and safety. Be sure to make and go to all appointments, and call your doctor if you are having problems. It's also a good idea to know your test results and keep a list of the medicines you take. How can you care for yourself at home? · Take all medicines exactly as prescribed. Call your doctor if you think you are having a problem with your medicine. · Get some extra rest. 
· Take an over-the-counter pain medicine, such as acetaminophen (Tylenol), ibuprofen (Advil, Motrin), or naproxen (Aleve) to reduce fever and relieve body aches. Read and follow all instructions on the label. · Do not take two or more pain medicines at the same time unless the doctor told you to.  Many pain medicines have acetaminophen, which is Tylenol. Too much acetaminophen (Tylenol) can be harmful. · Take an over-the-counter cough medicine that contains dextromethorphan to help quiet a dry, hacking cough so that you can sleep. Avoid cough medicines that have more than one active ingredient. Read and follow all instructions on the label. · Breathe moist air from a humidifier, hot shower, or sink filled with hot water. The heat and moisture will thin mucus so you can cough it out. · Do not smoke. Smoking can make bronchitis worse. If you need help quitting, talk to your doctor about stop-smoking programs and medicines. These can increase your chances of quitting for good. When should you call for help? Call 911 anytime you think you may need emergency care. For example, call if: 
· You have severe trouble breathing. Call your doctor now or seek immediate medical care if: 
· You have new or worse trouble breathing. · You cough up dark brown or bloody mucus (sputum). · You have a new or higher fever. · You have a new rash. Watch closely for changes in your health, and be sure to contact your doctor if: 
· You cough more deeply or more often, especially if you notice more mucus or a change in the color of your mucus. · You are not getting better as expected. Where can you learn more? Go to http://vanna-byron.info/. Enter H333 in the search box to learn more about \"Bronchitis: Care Instructions. \" Current as of: March 25, 2017 Content Version: 11.3 © 7132-0024 [employer]. Care instructions adapted under license by [employer] (which disclaims liability or warranty for this information). If you have questions about a medical condition or this instruction, always ask your healthcare professional. [place] [address] any warranty or liability for your use of this information. Introducing [facility] & HE[facility]! Dear Lucas Salazar: Thank you for requesting a [employer] account. Our records indicate that you have previously registered for a [employer] account but its currently inactive. Please call our [employer] support line at [phone]. Additional Information If you have questions, please visit the Frequently Asked Questions section of the [employer] website at [email or web address] Remember, [employer] is NOT to be used for urgent needs. For medical emergencies, dial 911. Now available from your iPhone and Android! Please provide this summary of care documentation to your next provider. Your primary care clinician is listed as [facility]O [facility]IT[facility]. If you have any questions after today's visit, please call [phone].

## 2017-10-19 LAB
ALBUMIN SERPL-MCNC: 4.3 G/DL (ref 3.5–4.8)
ALBUMIN/GLOB SERPL: 1.5 {RATIO} (ref 1.2–2.2)
ALP SERPL-CCNC: 108 IU/L (ref 39–117)
ALT SERPL-CCNC: 7 IU/L (ref 0–44)
APPEARANCE UR: CLEAR
AST SERPL-CCNC: 35 IU/L (ref 0–40)
BASOPHILS # BLD AUTO: 0 X10E3/UL (ref 0–0.2)
BASOPHILS NFR BLD AUTO: 0 %
BILIRUB SERPL-MCNC: 0.4 MG/DL (ref 0–1.2)
BILIRUB UR QL STRIP: NEGATIVE
BUN SERPL-MCNC: 14 MG/DL (ref 8–27)
BUN/CREAT SERPL: 13 (ref 10–24)
CALCIUM SERPL-MCNC: 9.3 MG/DL (ref 8.6–10.2)
CHLORIDE SERPL-SCNC: 101 MMOL/L (ref 96–106)
CHOLEST SERPL-MCNC: 165 MG/DL (ref 100–199)
CO2 SERPL-SCNC: 26 MMOL/L (ref 18–29)
COLOR UR: YELLOW
CREAT SERPL-MCNC: 1.09 MG/DL (ref 0.76–1.27)
EOSINOPHIL # BLD AUTO: 0.1 X10E3/UL (ref 0–0.4)
EOSINOPHIL NFR BLD AUTO: 3 %
ERYTHROCYTE [DISTWIDTH] IN BLOOD BY AUTOMATED COUNT: 14.4 % (ref 12.3–15.4)
EST. AVERAGE GLUCOSE BLD GHB EST-MCNC: 154 MG/DL
GFR SERPLBLD CREATININE-BSD FMLA CKD-EPI: 68 ML/MIN/1.73
GFR SERPLBLD CREATININE-BSD FMLA CKD-EPI: 79 ML/MIN/1.73
GLOBULIN SER CALC-MCNC: 2.9 G/DL (ref 1.5–4.5)
GLUCOSE SERPL-MCNC: 127 MG/DL (ref 65–99)
GLUCOSE UR QL: NEGATIVE
HBA1C MFR BLD: 7 % (ref 4.8–5.6)
HCT VFR BLD AUTO: 39.3 % (ref 37.5–51)
HDLC SERPL-MCNC: 38 MG/DL
HGB BLD-MCNC: 12.5 G/DL (ref 12.6–17.7)
HGB UR QL STRIP: NEGATIVE
IMM GRANULOCYTES # BLD: 0 X10E3/UL (ref 0–0.1)
IMM GRANULOCYTES NFR BLD: 0 %
KETONES UR QL STRIP: NEGATIVE
LDLC SERPL CALC-MCNC: 108 MG/DL (ref 0–99)
LEUKOCYTE ESTERASE UR QL STRIP: NEGATIVE
LYMPHOCYTES # BLD AUTO: 1.2 X10E3/UL (ref 0.7–3.1)
LYMPHOCYTES NFR BLD AUTO: 33 %
MCH RBC QN AUTO: 25.1 PG (ref 26.6–33)
MCHC RBC AUTO-ENTMCNC: 31.8 G/DL (ref 31.5–35.7)
MCV RBC AUTO: 79 FL (ref 79–97)
MICRO URNS: ABNORMAL
MONOCYTES # BLD AUTO: 0.3 X10E3/UL (ref 0.1–0.9)
MONOCYTES NFR BLD AUTO: 8 %
NEUTROPHILS # BLD AUTO: 2 X10E3/UL (ref 1.4–7)
NEUTROPHILS NFR BLD AUTO: 56 %
NITRITE UR QL STRIP: NEGATIVE
PH UR STRIP: 5.5 [PH] (ref 5–7.5)
PLATELET # BLD AUTO: 232 X10E3/UL (ref 150–379)
POTASSIUM SERPL-SCNC: 4.4 MMOL/L (ref 3.5–5.2)
PROT SERPL-MCNC: 7.2 G/DL (ref 6–8.5)
PROT UR QL STRIP: ABNORMAL
PSA SERPL-MCNC: 2.2 NG/ML (ref 0–4)
RBC # BLD AUTO: 4.99 X10E6/UL (ref 4.14–5.8)
SODIUM SERPL-SCNC: 143 MMOL/L (ref 134–144)
SP GR UR: >=1.03 (ref 1–1.03)
TRIGL SERPL-MCNC: 95 MG/DL (ref 0–149)
TSH SERPL DL<=0.005 MIU/L-ACNC: 1.71 UIU/ML (ref 0.45–4.5)
UROBILINOGEN UR STRIP-MCNC: 0.2 MG/DL (ref 0.2–1)
VLDLC SERPL CALC-MCNC: 19 MG/DL (ref 5–40)
WBC # BLD AUTO: 3.7 X10E3/UL (ref 3.4–10.8)

## 2017-10-21 NOTE — PROGRESS NOTES
Call- Labs look great overall , including stable diabetes. Only problem is cholesterol is higher than before. Is he taking his med routinely ? Let me know as adjustments may be needed.

## 2017-10-23 NOTE — PROGRESS NOTES
Advised pt labs look great overall including stable diabetes. Only problem is cholesterol is higher than before. MD wants to know if he taking his med routinely? He states he does not - causes leg cramps so bad it hurts to walk sometimes - so he skips ever other day. Asked if he has told MD about this - he said doesn't remember.  Will forward to MD.

## 2017-10-26 RX ORDER — SPIRONOLACTONE 25 MG/1
TABLET ORAL
Qty: 90 TAB | Refills: 3 | Status: SHIPPED | COMMUNITY
Start: 2017-10-26 | End: 2018-10-22 | Stop reason: SDUPTHER

## 2017-10-26 RX ORDER — FUROSEMIDE 20 MG/1
TABLET ORAL
Qty: 90 TAB | Refills: 3 | Status: SHIPPED | COMMUNITY
Start: 2017-10-26 | End: 2018-10-22 | Stop reason: SDUPTHER

## 2017-10-26 RX ORDER — CARVEDILOL 6.25 MG/1
TABLET ORAL
Qty: 180 TAB | Refills: 3 | Status: SHIPPED | COMMUNITY
Start: 2017-10-26 | End: 2018-10-22 | Stop reason: SDUPTHER

## 2017-11-14 NOTE — PROGRESS NOTES
Reviewed with Jenn Kennedy and son's appt. He's trying to do better with taking every day, also watching diet better. Therefore will Continue current regimen of prescription and / or OTC medications . Check lipid panel and appropriate studies to rule out med side effects in 6 months. High risk med management.

## 2017-12-19 RX ORDER — FOSINOPIRL SODIUM 10 MG/1
TABLET ORAL
Qty: 90 TAB | Refills: 3 | Status: SHIPPED | COMMUNITY
Start: 2017-12-19 | End: 2018-12-16 | Stop reason: SDUPTHER

## 2018-01-09 ENCOUNTER — HOSPITAL ENCOUNTER (EMERGENCY)
Age: 73
Discharge: HOME | End: 2018-01-09
Attending: FAMILY MEDICINE

## 2018-01-09 ENCOUNTER — TELEPHONE (OUTPATIENT)
Dept: INTERNAL MEDICINE CLINIC | Age: 73
End: 2018-01-09

## 2018-01-09 VITALS
WEIGHT: 240 LBS | DIASTOLIC BLOOD PRESSURE: 70 MMHG | SYSTOLIC BLOOD PRESSURE: 120 MMHG | HEART RATE: 67 BPM | BODY MASS INDEX: 30.8 KG/M2 | OXYGEN SATURATION: 98 % | HEIGHT: 74 IN | RESPIRATION RATE: 16 BRPM | TEMPERATURE: 98.7 F

## 2018-01-09 DIAGNOSIS — R10.9 FLANK PAIN: Primary | ICD-10-CM

## 2018-01-09 LAB
BILIRUB UR QL: NEGATIVE
GLUCOSE UR QL STRIP.AUTO: NEGATIVE MG/DL
KETONES UR-MCNC: NEGATIVE MG/DL
LEUKOCYTE ESTERASE UR QL STRIP: NEGATIVE
NITRITE UR QL: NEGATIVE
PH UR: 5 [PH] (ref 5–8)
PROT UR QL: ABNORMAL MG/DL
RBC # UR STRIP: NEGATIVE /UL
SP GR UR: 1.03 (ref 1–1.03)
UROBILINOGEN UR QL: 0.2 EU/DL (ref 0.2–1)

## 2018-01-09 RX ORDER — METHOCARBAMOL 500 MG/1
500 TABLET, FILM COATED ORAL 4 TIMES DAILY
Qty: 12 TAB | Refills: 0 | Status: SHIPPED | OUTPATIENT
Start: 2018-01-09 | End: 2018-08-08 | Stop reason: ALTCHOICE

## 2018-01-09 NOTE — UC PROVIDER NOTE
Patient is a 71 y.o. male presenting with flank pain. The history is provided by the patient. Flank Pain    This is a new problem. Episode onset: 1 month. The problem has not changed since onset. Episode frequency: off and on. Patient reports not work related injury. The pain is associated with no known injury. The pain is present in the right side. The quality of the pain is described as aching. The pain does not radiate. The pain is mild. The symptoms are aggravated by certain positions. Pertinent negatives include no chest pain and no fever. He has tried nothing for the symptoms. Past Medical History:   Diagnosis Date    CAD (coronary artery disease) '90 '97, '13 x 2    [place] [facility]    Calculus of kidney     Colonic polyps     Congestive heart failure, unspecified     Diabetes ([facility] [address]     Gastritis     Hypercholesterolemia     Sleep apnea         Past Surgical History:   Procedure Laterality Date    CARDIAC SURG PROCEDURE UNLIST  2012    x 4 vessels    COLONOSCOPY  04/06/2011    16, due 21    ENDOSCOPY, COLON, DIAGNOSTIC      11, due 16    HX CORONARY ARTERY BYPASS GRAFT  8/22/12    x 4 vessel by MISSY LION    HX PACEMAKER PLACEMENT  1/30/13         Family History   Problem Relation Age of Onset    Heart Disease Mother      MI    Heart Disease Father      CAD & PVD    Lung Disease Father     Cancer Father      lung    Diabetes Maternal Grandmother     Heart Disease Other      CAD    Stroke Sister         Social History     Social History    Marital status:      Spouse name: N/A    Number of children: N/A    Years of education: N/A     Occupational History    Not on file.      Social History Main Topics    Smoking status: Never Smoker    Smokeless tobacco: Never Used    Alcohol use 0.0 oz/week     0 Standard drinks or equivalent per week      Comment:  VERY RARE    Drug use: No    Sexual activity: Not on file     Other Topics Concern  Not on file     Social History Narrative                ALLERGIES: Pcn [penicillins]    Review of Systems   Constitutional: Negative for fever. Cardiovascular: Negative for chest pain. Genitourinary: Positive for flank pain. Vitals:    01/09/18 1145   BP: 120/70   Pulse: 67   Resp: 16   Temp: 98.7 °F (37.1 °C)   SpO2: 98%   Weight: 108.9 kg (240 lb)   Height: 6' 1.5\" (1.867 m)       Physical Exam   Constitutional: He is oriented to person, place, and time. He appears well-developed and well-nourished. Eyes: Conjunctivae and EOM are normal.   Abdominal: Soft. Bowel sounds are normal.   Musculoskeletal: Normal range of motion. He exhibits no tenderness. Neurological: He is alert and oriented to person, place, and time. Skin: Skin is warm and dry. Psychiatric: He has a normal mood and affect. His behavior is normal. Judgment and thought content normal.   Nursing note and vitals reviewed. MDM     Differential Diagnosis; Clinical Impression; Plan:     CLINICAL IMPRESSION:  Flank pain  (primary encounter diagnosis)    Plan:  1. Robaxin   2.   3.   Risk of Significant Complications, Morbidity, and/or Mortality:   Presenting problems: Moderate  Diagnostic procedures: Moderate  Management options:   Moderate  Progress:   Patient progress:  Stable      Procedures

## 2018-01-09 NOTE — TELEPHONE ENCOUNTER
Pt's wife called for appt for him - call sent back to triage. Spoke with wife - states pt complaining of right sided abdominal pain radiating to back x 2 days. No fever, nausea or vomiting. Spoke with pt - he states has had pain for several weeks but has increased past 2 days. He states no hx of kidney stones. Advised pt we have no appts available here today. MD recommends he be seen today at [facility] for further evaluation. Pt agreed to go.  Will forward to MD.

## 2018-04-06 RX ORDER — METFORMIN HYDROCHLORIDE 500 MG/1
TABLET ORAL
Qty: 360 TAB | Refills: 0 | Status: SHIPPED | OUTPATIENT
Start: 2018-04-06 | End: 2018-12-10 | Stop reason: SDUPTHER

## 2018-04-12 DIAGNOSIS — N40.0 BENIGN PROSTATIC HYPERPLASIA WITHOUT LOWER URINARY TRACT SYMPTOMS: ICD-10-CM

## 2018-04-12 RX ORDER — TAMSULOSIN HYDROCHLORIDE 0.4 MG/1
CAPSULE ORAL
Qty: 90 CAP | Refills: 3 | Status: SHIPPED | COMMUNITY
Start: 2018-04-12 | End: 2019-04-08 | Stop reason: SDUPTHER

## 2018-04-24 RX ORDER — GLIPIZIDE 10 MG/1
TABLET, FILM COATED, EXTENDED RELEASE ORAL
Qty: 90 TAB | Refills: 0 | Status: SHIPPED | COMMUNITY
Start: 2018-04-24 | End: 2018-07-24 | Stop reason: SDUPTHER

## 2018-07-25 RX ORDER — GLIPIZIDE 10 MG/1
TABLET, FILM COATED, EXTENDED RELEASE ORAL
Qty: 90 TAB | Refills: 0 | Status: SHIPPED | OUTPATIENT
Start: 2018-07-25 | End: 2018-10-21 | Stop reason: SDUPTHER

## 2018-08-08 ENCOUNTER — OFFICE VISIT (OUTPATIENT)
Dept: INTERNAL MEDICINE CLINIC | Age: 73
End: 2018-08-08

## 2018-08-08 VITALS
TEMPERATURE: 98.4 F | HEIGHT: 74 IN | DIASTOLIC BLOOD PRESSURE: 76 MMHG | SYSTOLIC BLOOD PRESSURE: 120 MMHG | RESPIRATION RATE: 16 BRPM | HEART RATE: 74 BPM | WEIGHT: 243 LBS | OXYGEN SATURATION: 98 % | BODY MASS INDEX: 31.18 KG/M2

## 2018-08-08 DIAGNOSIS — E11.51 TYPE 2 DIABETES, CONTROLLED, WITH PERIPHERAL CIRCULATORY DISORDER (HCC): Primary | ICD-10-CM

## 2018-08-08 DIAGNOSIS — I83.893 SYMPTOMATIC VARICOSE VEINS, BILATERAL: ICD-10-CM

## 2018-08-08 DIAGNOSIS — I46.9 CARDIAC ARREST (HCC): ICD-10-CM

## 2018-08-08 DIAGNOSIS — E78.2 MIXED HYPERLIPIDEMIA: ICD-10-CM

## 2018-08-08 DIAGNOSIS — N20.0 CALCULUS OF KIDNEY: ICD-10-CM

## 2018-08-08 DIAGNOSIS — I25.10 ATHEROSCLEROSIS OF NATIVE CORONARY ARTERY OF NATIVE HEART WITHOUT ANGINA PECTORIS: ICD-10-CM

## 2018-08-08 DIAGNOSIS — I50.9 CHF, STAGE C (HCC): ICD-10-CM

## 2018-08-08 DIAGNOSIS — E11.9 ENCOUNTER FOR DIABETIC FOOT EXAM (HCC): ICD-10-CM

## 2018-08-08 NOTE — PROGRESS NOTES
HISTORY OF PRESENT ILLNESS  Jose M Mason is a 68 y.o. male. ISAAK Maldonado is seen today after a long absence for follow up of diabetes and hyperlipidemia and other problems. 1. Diabetes, hyperlipidemia. Overdue for labs. Strongly encouraged him to have these done. 2. CAD, CHF. He is about due for routine cardiology follow up. He denies any active symptoms currently. 3. Preventive medicine. He is getting foot care at the [place]. He is up to date with an eye exam.     Review of systems notable for significant leg swelling due to varicose veins. The left leg has pronounced veins. Reviewed with him the benefits of vascular surgery evaluation. Names are provided and he will schedule on his own. He can then decide if he would want to proceed through the [place] or in the Replaced by [facility]. Review of systems also notable for occasional right flank pain. He does have a past history of kidney stones. He denies blood in the urine or any specific urinary symptoms. Social history notable for him splitting care between the community and the [place] system. I encouraged him to make sure he follows up routinely with one of his providers so diabetic management specifically does not lapse. Review of Systems   Constitutional: Negative for weight loss. Respiratory: Negative. Cardiovascular: Positive for leg swelling. Negative for chest pain, palpitations and PND. Musculoskeletal: Negative for myalgias. Neurological: Negative for focal weakness. Physical Exam   Constitutional: No distress. Neck: Carotid bruit is not present. Cardiovascular: Normal rate and regular rhythm. Exam reveals no gallop and no friction rub. No murmur heard. Pulmonary/Chest: Effort normal and breath sounds normal. No respiratory distress. Musculoskeletal: He exhibits edema. Moderate varicosities   Feet without lesion or ulcer except for onychomycosis   Nursing note and vitals reviewed.       ASSESSMENT and PLAN  Diagnoses and all orders for this visit:    1. Type 2 diabetes, controlled, with peripheral circulatory disorder (HCC)  -     HEMOGLOBIN A1C WITH EAG  -     MICROALBUMIN, UR, RAND W/ MICROALB/CREAT RATIO  -     UA/M W/RFLX CULTURE, ROUTINE    2. Cardiac arrest [facility])- See cardiologist as directed. 3. CHF, stage C [facility])- See cardiologist as directed. 4. Calculus of kidney- follow, see hpi    5. Atherosclerosis of native coronary artery of native heart without angina pectoris- See cardiologist as directed. 6. Mixed hyperlipidemia  -     CBC WITH AUTOMATED DIFF  -     TSH 3RD GENERATION  -     METABOLIC PANEL, COMPREHENSIVE  -     LIPID PANEL    7. Encounter for diabetic foot exam ([facility] [address]  -      DIABETES FOOT EXAM    8.  Symptomatic varicose veins, bilateral  -     REFERRAL TO SURGERY

## 2018-08-08 NOTE — MR AVS SNAPSHOT
[address] [address] 
[phone] Patient: Evelyn Kohli 
MRN:  [id number] Visit Information Date & Time Provider Department Dept. Phone Encounter #  
 2018  5:05 PM Silverio Luque, [address] [facility] Internal Medicine [phone] [id number] Follow-up Instructions Return in about 6 months (around 2019). Upcoming Health Maintenance Date Due DTaP/Tdap/Td series (1 - Tdap) 1966 MICROALBUMIN Q1 3/7/2018 HEMOGLOBIN A1C Q6M 2018 Influenza Age 5 to Adult 2018 EYE EXAM RETINAL OR DILATED Q1 2018* MEDICARE YEARLY EXAM 2018 LIPID PANEL Q1 10/18/2018 GLAUCOMA SCREENING Q2Y 2019 FOOT EXAM Q1 2019 COLONOSCOPY 2026 *Topic was postponed. The date shown is not the original due date. Allergies as of 2018  Review Complete On: 2018 By: Silverio Luque MD  
  
 Severity Noted Reaction Type Reactions Pcn [Penicillins]  2010    Hives Current Immunizations  Reviewed on 2017 Name Date Influenza High Dose Vaccine PF 10/1/2016 Influenza Vaccine Whole 10/11/2010 Pneumococcal Conjugate (PCV-13) 2015 Pneumococcal Polysaccharide (PPSV-23) 2012 ZZZ-RETIRED (DO NOT USE) Pneumococcal Vaccine (Unspecified Type) 2002 Zoster 10/10/2010 Not reviewed this visit You Were Diagnosed With   
  
 Codes Comments Type 2 diabetes, controlled, with peripheral circulatory disorder (HCC)    -  Primary ICD-10-CM: E11.51 
ICD-9-CM: 250.70, 443.81 Cardiac arrest [facility])     ICD-10-CM: I46.9 ICD-9-CM: 427.5 CHF, stage C ([facility] [address]     ICD-10-CM: I50.9 ICD-9-CM: 428.0 Calculus of kidney     ICD-10-CM: N20.0 ICD-9-CM: 592.0 Atherosclerosis of native coronary artery of native heart without angina pectoris     ICD-10-CM: I25.10 ICD-9-CM: 414.01   
 Mixed hyperlipidemia     ICD-10-CM: E78.2 ICD-9-CM: 272.2 Encounter for diabetic foot exam ([facility] [address]     ICD-10-CM: E11.9 ICD-9-CM: 250.00 Symptomatic varicose veins, bilateral     ICD-10-CM: I43.119 ICD-9-CM: 454.8 Vitals BP Pulse Temp Resp Height(growth percentile) Weight(growth percentile) 120/76 74 98.4 °F (36.9 °C) (Oral) 16 6' 1.5\" (1.867 m) 243 lb (110.2 kg) SpO2 BMI Smoking Status 98% 31.63 kg/m2 Never Smoker Vitals History BMI and BSA Data Body Mass Index Body Surface Area  
 31.63 kg/m 2 2.39 m 2 Preferred Pharmacy Pharmacy Name Phone Bozena Wilcox, [facility] [phone] Your Updated Medication List  
  
   
This list is accurate as of 8/8/18  6:32 PM.  Always use your most recent med list.  
  
  
  
  
 aspirin delayed-release 81 mg tablet Take 81 mg by mouth daily. carvedilol 6.25 mg tablet Commonly known as:  COREG  
TAKE 1 TABLET TWICE A DAY WITH MEALS  
  
 CRESTOR 10 mg tablet Generic drug:  rosuvastatin Take 10 mg by mouth nightly. fosinopril 10 mg tablet Commonly known as:  MONOPRIL  
TAKE 1 TABLET DAILY  
  
 furosemide 20 mg tablet Commonly known as:  LASIX TAKE 1 TABLET DAILY  
  
 glipiZIDE SR 10 mg CR tablet Commonly known as:  GLUCOTROL XL  
TAKE 1 TABLET DAILY (DUE FOR OFFICE VISIT PLEASE CALL OFFICE TO SCHEDULE) glucose blood VI test strips strip Commonly known as:  blood glucose test  
[employer] brand test strips. Dx .00  
  
 metFORMIN 500 mg tablet Commonly known as:  GLUCOPHAGE  
TAKE 2 TABLETS TWICE A DAY WITH MEALS  
  
 omeprazole 40 mg capsule Commonly known as:  PRILOSEC Take 1 capsule by mouth daily as needed. potassium chloride SR 10 mEq tablet Commonly known as:  KLOR-CON 10  
TAKE 3 TABLETS DAILY  
  
 prasugrel 10 mg tablet Commonly known as:  EFFIENT TAKE 1 TABLET DAILY spironolactone 25 mg tablet Commonly known as:  ALDACTONE  
TAKE 1 TABLET DAILY  
  
 tamsulosin 0.4 mg capsule Commonly known as:  FLOMAX TAKE 1 CAPSULE DAILY  
  
 TYLENOL EXTRA STRENGTH 500 mg tablet Generic drug:  acetaminophen Take  by mouth every six (6) hours as needed. We Performed the Following CBC WITH AUTOMATED DIFF [34092 CPT(R)] HEMOGLOBIN A1C WITH EAG [83766 CPT(R)]  DIABETES FOOT EXAM [HM7 Custom] LIPID PANEL [91470 CPT(R)] METABOLIC PANEL, COMPREHENSIVE [41649 CPT(R)] MICROALBUMIN, UR, RAND W/ MICROALB/CREAT RATIO [id number] CPT(R)] REFERRAL TO SURGERY [QUP232 Custom] TSH 3RD GENERATION [64042 CPT(R)] UA/M W/RFLX CULTURE, ROUTINE [[id number] Custom] Follow-up Instructions Return in about 6 months (around 2/8/2019). Referral Information Referral ID Referred By Referred To  
  
 [id number] [facility] [address] [place] [address], [address] Visits Status Start Date End Date 1 New Request 8/8/18 8/8/19 If your referral has a status of pending review or denied, additional information will be sent to support the outcome of this decision. Introducing [facility]! [employer] introduces [email or web address] patient portal. Now you can access parts of your medical record, email your doctor's office, and request medication refills online. 1. In your internet browser, go to [email or web address]://[email or web address]. [employer] ([employer])t 2. Click on the First Time User? Click Here link in the Sign In box. You will see the New Member Sign Up page. 3. Enter your [email or web address] Access Code exactly as it appears below. You will not need to use this code after youve completed the sign-up process. If you do not sign up before the expiration date, you must request a new code. · [email or web address] Access Code: [email or web address] Expires: 11/6/2018  4:34 PM 
 
 4. Enter the last four digits of your Social Security Number (xxxx) and Date of Birth (mm/dd/yyyy) as indicated and click Submit. You will be taken to the next sign-up page. 5. Create a [email or web address] ID. This will be your [email or web address] login ID and cannot be changed, so think of one that is secure and easy to remember. 6. Create a [email or web address].com password. You can change your password at any time. 7. Enter your Password Reset Question and Answer. This can be used at a later time if you forget your password. 8. Enter your e-mail address. You will receive e-mail notification when new information is available in [address] 9. Click Sign Up. You can now view and download portions of your medical record. 10. Click the Download Summary menu link to download a portable copy of your medical information. If you have questions, please visit the Frequently Asked Questions section of the [email or web address] website. Remember, [email or web address] is NOT to be used for urgent needs. For medical emergencies, dial 911. Now available from your iPhone and Android! Please provide this summary of care documentation to your next provider. Your primary care clinician is listed as [facility]. If you have any questions after today's visit, please call [phone].

## 2018-08-15 LAB
ALBUMIN SERPL-MCNC: 4 G/DL (ref 3.5–4.8)
ALBUMIN/CREAT UR: 13 MG/G CREAT (ref 0–30)
ALBUMIN/GLOB SERPL: 1.7 {RATIO} (ref 1.2–2.2)
ALP SERPL-CCNC: 92 IU/L (ref 39–117)
ALT SERPL-CCNC: 6 IU/L (ref 0–44)
APPEARANCE UR: CLEAR
AST SERPL-CCNC: 35 IU/L (ref 0–40)
BACTERIA #/AREA URNS HPF: NORMAL /[HPF]
BASOPHILS # BLD AUTO: 0 X10E3/UL (ref 0–0.2)
BASOPHILS NFR BLD AUTO: 1 %
BILIRUB SERPL-MCNC: 0.4 MG/DL (ref 0–1.2)
BILIRUB UR QL STRIP: NEGATIVE
BUN SERPL-MCNC: 12 MG/DL (ref 8–27)
BUN/CREAT SERPL: 11 (ref 10–24)
CALCIUM SERPL-MCNC: 8.9 MG/DL (ref 8.6–10.2)
CASTS URNS QL MICRO: NORMAL /LPF
CHLORIDE SERPL-SCNC: 104 MMOL/L (ref 96–106)
CHOLEST SERPL-MCNC: 128 MG/DL (ref 100–199)
CO2 SERPL-SCNC: 23 MMOL/L (ref 20–29)
COLOR UR: YELLOW
CREAT SERPL-MCNC: 1.08 MG/DL (ref 0.76–1.27)
CREAT UR-MCNC: 297.6 MG/DL
EOSINOPHIL # BLD AUTO: 0.1 X10E3/UL (ref 0–0.4)
EOSINOPHIL NFR BLD AUTO: 2 %
EPI CELLS #/AREA URNS HPF: NORMAL /HPF
ERYTHROCYTE [DISTWIDTH] IN BLOOD BY AUTOMATED COUNT: 15.2 % (ref 12.3–15.4)
EST. AVERAGE GLUCOSE BLD GHB EST-MCNC: 160 MG/DL
GLOBULIN SER CALC-MCNC: 2.3 G/DL (ref 1.5–4.5)
GLUCOSE SERPL-MCNC: 136 MG/DL (ref 65–99)
GLUCOSE UR QL: NEGATIVE
HBA1C MFR BLD: 7.2 % (ref 4.8–5.6)
HCT VFR BLD AUTO: 39.5 % (ref 37.5–51)
HDLC SERPL-MCNC: 41 MG/DL
HGB BLD-MCNC: 12.3 G/DL (ref 13–17.7)
HGB UR QL STRIP: NEGATIVE
IMM GRANULOCYTES # BLD: 0 X10E3/UL (ref 0–0.1)
IMM GRANULOCYTES NFR BLD: 0 %
KETONES UR QL STRIP: NEGATIVE
LDLC SERPL CALC-MCNC: 75 MG/DL (ref 0–99)
LEUKOCYTE ESTERASE UR QL STRIP: NEGATIVE
LYMPHOCYTES # BLD AUTO: 1.4 X10E3/UL (ref 0.7–3.1)
LYMPHOCYTES NFR BLD AUTO: 36 %
MCH RBC QN AUTO: 25.6 PG (ref 26.6–33)
MCHC RBC AUTO-ENTMCNC: 31.1 G/DL (ref 31.5–35.7)
MCV RBC AUTO: 82 FL (ref 79–97)
MICRO URNS: ABNORMAL
MICRO URNS: ABNORMAL
MICROALBUMIN UR-MCNC: 38.7 UG/ML
MONOCYTES # BLD AUTO: 0.3 X10E3/UL (ref 0.1–0.9)
MONOCYTES NFR BLD AUTO: 8 %
MUCOUS THREADS URNS QL MICRO: PRESENT
NEUTROPHILS # BLD AUTO: 2 X10E3/UL (ref 1.4–7)
NEUTROPHILS NFR BLD AUTO: 53 %
NITRITE UR QL STRIP: NEGATIVE
PH UR STRIP: 5 [PH] (ref 5–7.5)
PLATELET # BLD AUTO: 200 X10E3/UL (ref 150–379)
POTASSIUM SERPL-SCNC: 4.3 MMOL/L (ref 3.5–5.2)
PROT SERPL-MCNC: 6.3 G/DL (ref 6–8.5)
PROT UR QL STRIP: ABNORMAL
RBC # BLD AUTO: 4.8 X10E6/UL (ref 4.14–5.8)
RBC #/AREA URNS HPF: NORMAL /HPF
SODIUM SERPL-SCNC: 142 MMOL/L (ref 134–144)
SP GR UR: >=1.03 (ref 1–1.03)
TRIGL SERPL-MCNC: 62 MG/DL (ref 0–149)
TSH SERPL DL<=0.005 MIU/L-ACNC: 1.74 UIU/ML (ref 0.45–4.5)
URINALYSIS REFLEX, 377202: ABNORMAL
UROBILINOGEN UR STRIP-MCNC: 0.2 MG/DL (ref 0.2–1)
VLDLC SERPL CALC-MCNC: 12 MG/DL (ref 5–40)
WBC # BLD AUTO: 3.8 X10E3/UL (ref 3.4–10.8)
WBC #/AREA URNS HPF: NORMAL /HPF

## 2018-10-04 RX ORDER — PRASUGREL 10 MG/1
10 TABLET, FILM COATED ORAL DAILY
Qty: 90 TAB | Refills: 1 | Status: SHIPPED | OUTPATIENT
Start: 2018-10-04 | End: 2019-04-02 | Stop reason: SDUPTHER

## 2018-10-22 RX ORDER — SPIRONOLACTONE 25 MG/1
TABLET ORAL
Qty: 90 TAB | Refills: 3 | Status: SHIPPED | OUTPATIENT
Start: 2018-10-22 | End: 2019-10-19 | Stop reason: SDUPTHER

## 2018-10-22 RX ORDER — CARVEDILOL 6.25 MG/1
TABLET ORAL
Qty: 180 TAB | Refills: 3 | Status: SHIPPED | OUTPATIENT
Start: 2018-10-22 | End: 2019-10-19 | Stop reason: SDUPTHER

## 2018-10-22 RX ORDER — FUROSEMIDE 20 MG/1
TABLET ORAL
Qty: 90 TAB | Refills: 3 | Status: SHIPPED | OUTPATIENT
Start: 2018-10-22 | End: 2019-06-17

## 2018-10-22 RX ORDER — GLIPIZIDE 10 MG/1
TABLET, FILM COATED, EXTENDED RELEASE ORAL
Qty: 90 TAB | Refills: 0 | Status: SHIPPED | OUTPATIENT
Start: 2018-10-22 | End: 2019-01-20 | Stop reason: SDUPTHER

## 2018-12-11 RX ORDER — METFORMIN HYDROCHLORIDE 500 MG/1
TABLET ORAL
Qty: 360 TAB | Refills: 1 | Status: SHIPPED | OUTPATIENT
Start: 2018-12-11 | End: 2019-06-09 | Stop reason: SDUPTHER

## 2018-12-17 RX ORDER — FOSINOPIRL SODIUM 10 MG/1
TABLET ORAL
Qty: 90 TAB | Refills: 3 | Status: SHIPPED | OUTPATIENT
Start: 2018-12-17 | End: 2019-12-14 | Stop reason: SDUPTHER

## 2019-01-17 ENCOUNTER — OFFICE VISIT (OUTPATIENT)
Dept: INTERNAL MEDICINE CLINIC | Age: 74
End: 2019-01-17

## 2019-01-17 ENCOUNTER — HOSPITAL ENCOUNTER (OUTPATIENT)
Dept: GENERAL RADIOLOGY | Age: 74
Discharge: HOME | End: 2019-01-17
Attending: INTERNAL MEDICINE
Payer: MEDICARE

## 2019-01-17 VITALS
BODY MASS INDEX: 30.8 KG/M2 | WEIGHT: 240 LBS | DIASTOLIC BLOOD PRESSURE: 82 MMHG | TEMPERATURE: 97.9 F | HEART RATE: 88 BPM | SYSTOLIC BLOOD PRESSURE: 132 MMHG | OXYGEN SATURATION: 98 % | HEIGHT: 74 IN

## 2019-01-17 DIAGNOSIS — I25.10 ATHEROSCLEROSIS OF NATIVE CORONARY ARTERY OF NATIVE HEART WITHOUT ANGINA PECTORIS: ICD-10-CM

## 2019-01-17 DIAGNOSIS — R05.9 COUGH: ICD-10-CM

## 2019-01-17 DIAGNOSIS — E78.2 MIXED HYPERLIPIDEMIA: ICD-10-CM

## 2019-01-17 DIAGNOSIS — Z12.5 SCREENING FOR PROSTATE CANCER: ICD-10-CM

## 2019-01-17 DIAGNOSIS — Z71.89 ADVANCE CARE PLANNING: ICD-10-CM

## 2019-01-17 DIAGNOSIS — I46.9 CARDIAC ARREST (HCC): ICD-10-CM

## 2019-01-17 DIAGNOSIS — E11.51 TYPE 2 DIABETES, CONTROLLED, WITH PERIPHERAL CIRCULATORY DISORDER (HCC): ICD-10-CM

## 2019-01-17 DIAGNOSIS — Z12.11 SCREENING FOR COLON CANCER: ICD-10-CM

## 2019-01-17 DIAGNOSIS — Z23 ENCOUNTER FOR IMMUNIZATION: ICD-10-CM

## 2019-01-17 DIAGNOSIS — Z00.00 MEDICARE ANNUAL WELLNESS VISIT, SUBSEQUENT: Primary | ICD-10-CM

## 2019-01-17 DIAGNOSIS — Z13.39 SCREENING FOR ALCOHOLISM: ICD-10-CM

## 2019-01-17 DIAGNOSIS — Z23 NEED FOR TDAP VACCINATION: ICD-10-CM

## 2019-01-17 DIAGNOSIS — Z13.31 SCREENING FOR DEPRESSION: ICD-10-CM

## 2019-01-17 DIAGNOSIS — I50.9 CHF, STAGE C (HCC): ICD-10-CM

## 2019-01-17 PROCEDURE — 71046 X-RAY EXAM CHEST 2 VIEWS: CPT

## 2019-01-17 NOTE — PROGRESS NOTES
Julisa Addison is a 68 y.o. male and presents for [facility] Visit. Assessment of cognitive impairment: Alert and oriented x 3. Abuse Screen:  Patient is not abused    Depression Screen:   PHQ over the last two weeks 1/17/2019   Little interest or pleasure in doing things Not at all   Feeling down, depressed, irritable, or hopeless Not at all   Total Score PHQ 2 0       Fall Risk Assessment:    Fall Risk Assessment, last 12 mths 1/17/2019   Able to walk? Yes   Fall in past 12 months? Yes   Fall with injury? No   Number of falls in past 12 months 2   Fall Risk Score 2       Activities of Daily Living:    ADL Assessment 1/17/2019   Feeding yourself No Help Needed   Getting from bed to chair No Help Needed   Getting dressed No Help Needed   Bathing or showering No Help Needed   Walk across the room (includes cane/walker) No Help Needed   Using the telphone No Help Needed   Taking your medications No Help Needed   Preparing meals No Help Needed   Managing money (expenses/bills) No Help Needed   Moderately strenuous housework (laundry) No Help Needed   Shopping for personal items (toiletries/medicines) No Help Needed   Shopping for groceries No Help Needed   Driving No Help Needed   Climbing a flight of stairs No Help Needed   Getting to places beyond walking distances No Help Needed       Health Maintenance:  Daily Low Dose Aspirin: yes  Bone Density: NA  Glaucoma Screening: yes 8/28/18 with Dr. Yarelis Anaya  Immunizations:    Tetanus: order placed. Influenza: up to date December 2018. Shingles:  Zostavax: up to date. Shingrix:[facility] if [facility] this already   Pneumovax:  up to date 12/5/12. Prevnar: up to date 1/1/15. Cancer screening:    Cervical: NA.  Breast: NA.  Colon: up to date 4/19/16 q5 years, FOBT ordered today. Prostate:  Order placed today. Last PSA 2.2 in 2017.     Advance Care Planning:   End of Life Planning: has an advanced directive - a copy has been provided. Provided pt with \"Respecting Choices packet of Information\" no  Offered facilitator session with NN no     Medications/Allergies: Reviewed with patient  Prior to Admission medications    Medication Sig Start Date End Date Taking? Authorizing Provider   diphtheria-pertussis, acellular,-tetanus (BOOSTRIX TDAP) 2.5-8-5 Lf-mcg-Lf/0.5mL susp susp 0.5 mL by IntraMUSCular route once for 1 dose. 1/17/19 1/17/19 Yes Jorge Gustafson MD   fosinopril (MONOPRIL) 10 mg tablet TAKE 1 TABLET DAILY 12/17/18  Yes Jorge Gustafson MD   metFORMIN (GLUCOPHAGE) 500 mg tablet TAKE 2 TABLETS TWICE A DAY WITH MEALS 12/11/18  Yes Jorge Gustafson MD   glipiZIDE SR (GLUCOTROL XL) 10 mg CR tablet TAKE 1 TABLET DAILY (DUE FOR OFFICE VISIT PLEASE CALL OFFICE TO SCHEDULE) 10/22/18  Yes Jorge Gustafson MD   carvedilol (COREG) 6.25 mg tablet TAKE 1 TABLET TWICE A DAY WITH MEALS 10/22/18  Yes Jorge Gustafson MD   spironolactone (ALDACTONE) 25 mg tablet TAKE 1 TABLET DAILY 10/22/18  Yes Jorge Gustafson MD   furosemide (LASIX) 20 mg tablet TAKE 1 TABLET DAILY 10/22/18  Yes Jorge Gustafson MD   prasugrel (EFFIENT) 10 mg tablet Take 1 Tab by mouth daily. 10/4/18  Yes Benoit Bosch MD   tamsulosin (FLOMAX) 0.4 mg capsule TAKE 1 CAPSULE DAILY 4/12/18  Yes Jorge Gustafson MD   potassium chloride SR (KLOR-CON 10) 10 mEq tablet TAKE 3 TABLETS DAILY 5/25/17  Yes Jorge Gustafson MD   omeprazole (PRILOSEC) 40 mg capsule Take 1 capsule by mouth daily as needed. 9/2/14  Yes Jorge Gustafson MD   rosuvastatin (CRESTOR) 10 mg tablet Take 10 mg by mouth nightly. Yes Provider, Historical   glucose blood VI test strips (BLOOD GLUCOSE TEST) strip [employer] brand test strips. Dx .00 9/4/12  Yes Jorge Gustafson MD   aspirin delayed-release 81 mg tablet Take 81 mg by mouth daily.    Yes Provider, Historical   acetaminophen (TYLENOL EXTRA STRENGTH) 500 mg tablet Take  by mouth every six (6) hours as needed. Provider, Historical     Allergies   Allergen Reactions    Pcn [Penicillins] Hives       PSH: Reviewed with patient  Past Surgical History:   Procedure Laterality Date    CARDIAC SURG PROCEDURE UNLIST  2012    x 4 vessels    COLONOSCOPY  04/06/2011    16, due 21    ENDOSCOPY, COLON, DIAGNOSTIC      11, due 16    HX CORONARY ARTERY BYPASS GRAFT  8/22/12    x 4 vessel by MISSY CABRALES HEENT      LASIK    HX PACEMAKER PLACEMENT  1/30/13        SH: Reviewed with patient  Social History     Tobacco Use    Smoking status: Never Smoker    Smokeless tobacco: Never Used   Substance Use Topics    Alcohol use: Yes     Alcohol/week: 0.0 oz     Comment:  VERY RARE    Drug use: No       FH: Reviewed with patient  Family History   Problem Relation Age of Onset    Heart Disease Mother         MI    Heart Disease Father         CAD & PVD    Lung Disease Father     Cancer Father         lung    Diabetes Maternal Grandmother     Heart Disease Other         CAD    Stroke Sister          Objective:  Visit Vitals  /82 (BP 1 Location: Right arm, BP Patient Position: Sitting)   Pulse 88   Temp 97.9 °F (36.6 °C) (Oral)   Ht 6' 1.5\" (1.867 m)   Wt 240 lb (108.9 kg)   SpO2 98%   BMI 31.23 kg/m²    Body mass index is 31.23 kg/m². Alcohol Risk Screen:   On any occasion during past 3 months, have you had more than 3 drinks (female) or 4 drinks (male) containing alcohol? No  Do you average more than 7 drinks (female) or 14 drinks (male) per week? No  Type and Amount: 1 drink every 6 month    Tobacco Abuse:  No    Nutrition Screen:  eats a balanced diet    Hearing Loss:  Reports mild loss    Vision Loss:   Wears glasses, contact lenses, or have any other visual impairment  Wears glasses    Activities of Daily Living:  Self-care.    Requires assistance with: no ADLs  Patient handle his/her own medications  yes Use of pill box  yes    Current medical providers:    Patient Care Team:  Gage Mclaughlin MD as PCP - Aga Davis MD as Physician (Cardiology)  Joie Interiano MD as Physician (Cardiology)  Seamus Hammond MD as Physician (Gastroenterology)  Jeff Barrientos MD as Physician (Orthopedic Surgery)  Devora Dave MD as Physician (Ophthalmology)  Darrell Higashi Wilms, MD as Physician (General Practice)      Plan:      Orders Placed This Encounter    XR CHEST PA LAT    PSA SCREENING (SCREENING)    LIPID PANEL    HEMOGLOBIN A1C WITH EAG    CBC WITH AUTOMATED DIFF    METABOLIC PANEL, COMPREHENSIVE    OCCULT BLOOD IMMUNOASSAY,DIAGNOSTIC    diphtheria-pertussis, acellular,-tetanus (BOOSTRIX TDAP) 2.5-8-5 Lf-mcg-Lf/0.5mL susp susp       Health Maintenance   Topic Date Due    DTaP/Tdap/Td series (1 - Tdap) 01/24/1966    HEMOGLOBIN A1C Q6M  02/14/2019    Shingrix Vaccine Age 50> (1 of 2) 02/01/2020 (Originally 1/24/1996)    FOOT EXAM Q1  08/08/2019    MICROALBUMIN Q1  08/14/2019    LIPID PANEL Q1  08/14/2019    MEDICARE YEARLY EXAM  01/18/2020    GLAUCOMA SCREENING Q2Y  03/06/2020    EYE EXAM RETINAL OR DILATED  03/06/2020    COLONOSCOPY  04/19/2021    Hepatitis C Screening  Completed    Pneumococcal 65+ Low/Medium Risk  Completed    Influenza Age 5 to Adult  Completed       *Patient verbalized understanding and agreement with the plan. A copy of the After Visit Summary with personalized health plan was given to the patient today. Physical Exam will be performed by PCP and documented under a separate Progress Note.

## 2019-01-17 NOTE — PROGRESS NOTES
HISTORY OF PRESENT ILLNESS  Oralia Hassan is a 68 y.o. male. HPI Subjective:  Shashi Villela is seen today accompanied by his wife, Ruth East, for Medicare wellness visit and follow up of chronic problems. 1. Medicare wellness visit. See attached RN note. This was fully reviewed and discussed with Debra. He is due for the TDAP booster, possibly the shingles vaccine and colorectal cancer screening with stool OB. A kit was provided. [facility]. 2. Chronic problems are reviewed. a. Hyperlipidemia, diabetes. He is due for routine labs. b. CAD, CHF. Up to date with cardiology follow up and clinically stable. c. Obstructive sleep apnea, up to date with sleep specialist.  3. New problem reviewed. He has had a cough for about a week and simply has not felt that well. He mainly has fatigue. No other localizing symptoms. Reviewed for him the need for a chest x-ray given that he has some pain when he coughs, as well as routine labs. I suspect he has a viral infection and it should gradually resolve. If the cough is persistent beyond two additional weeks, consider a change from his ACE inhibitor. They will let me know. Review of Systems:  Negative for any bowel or bladder changes. Social History:  Notable for him being inducted into the [facility] in a ceremony this upcoming weekend. Congratulations were offered. I believe this is the class of 47 and they won the state championship. Current Outpatient Medications   Medication Sig    fosinopril (MONOPRIL) 10 mg tablet TAKE 1 TABLET DAILY    metFORMIN (GLUCOPHAGE) 500 mg tablet TAKE 2 TABLETS TWICE A DAY WITH MEALS    carvedilol (COREG) 6.25 mg tablet TAKE 1 TABLET TWICE A DAY WITH MEALS    spironolactone (ALDACTONE) 25 mg tablet TAKE 1 TABLET DAILY    furosemide (LASIX) 20 mg tablet TAKE 1 TABLET DAILY    prasugrel (EFFIENT) 10 mg tablet Take 1 Tab by mouth daily.     tamsulosin (FLOMAX) 0.4 mg capsule TAKE 1 CAPSULE DAILY    potassium chloride SR (KLOR-CON 10) 10 mEq tablet TAKE 3 TABLETS DAILY    omeprazole (PRILOSEC) 40 mg capsule Take 1 capsule by mouth daily as needed.  rosuvastatin (CRESTOR) 10 mg tablet Take 10 mg by mouth nightly.  glucose blood VI test strips (BLOOD GLUCOSE TEST) strip [employer] brand test strips. Dx .00    aspirin delayed-release 81 mg tablet Take 81 mg by mouth daily.  glipiZIDE SR (GLUCOTROL XL) 10 mg CR tablet TAKE 1 TABLET DAILY (DUE FOR OFFICE VISIT PLEASE CALL OFFICE TO SCHEDULE)    acetaminophen (TYLENOL EXTRA STRENGTH) 500 mg tablet Take  by mouth every six (6) hours as needed. No current facility-administered medications for this visit. Review of Systems   Constitutional: Positive for malaise/fatigue. Negative for weight loss. Respiratory: Positive for cough. Negative for sputum production, shortness of breath and wheezing. Cardiovascular: Negative for chest pain, palpitations, leg swelling and PND. Gastrointestinal: Negative for blood in stool and melena. Genitourinary: Negative. Musculoskeletal: Negative for myalgias. Neurological: Negative for focal weakness. Physical Exam   Constitutional: He is oriented to person, place, and time. He appears well-developed and well-nourished. No distress. HENT:   Head: Normocephalic and atraumatic. Right Ear: Tympanic membrane, external ear and ear canal normal.   Left Ear: Tympanic membrane, external ear and ear canal normal.   Eyes: EOM are normal. Pupils are equal, round, and reactive to light. Right eye exhibits no discharge. Left eye exhibits no discharge. Neck: Normal range of motion. Neck supple. Carotid bruit is not present. No thyromegaly present. Cardiovascular: Normal rate, regular rhythm, normal heart sounds and intact distal pulses. Exam reveals no gallop and no friction rub. No murmur heard.   Pulmonary/Chest: Effort normal and breath sounds normal. No respiratory distress. He has no wheezes. He has no rales. Abdominal: Soft. Bowel sounds are normal. He exhibits no distension and no mass. There is no tenderness. There is no rebound and no guarding. Musculoskeletal: Normal range of motion. He exhibits no edema or tenderness. Lymphadenopathy:     He has no cervical adenopathy. Neurological: He is alert and oriented to person, place, and time. He has normal reflexes. Skin: Skin is warm and dry. No rash noted. Psychiatric: He has a normal mood and affect. His behavior is normal.   Nursing note and vitals reviewed. ASSESSMENT and PLAN  Diagnoses and all orders for this visit:    1. Medicare annual wellness visit, subsequent    2. Screening for alcoholism    3. Screening for depression    4. Advance care planning    5. Cough  -     XR CHEST PA LAT; Future    6. Screening for colon cancer  -     OCCULT BLOOD IMMUNOASSAY,DIAGNOSTIC    7. Need for Tdap vaccination  -     diphtheria-pertussis, acellular,-tetanus (BOOSTRIX TDAP) 2.5-8-5 Lf-mcg-Lf/0.5mL susp susp; 0.5 mL by IntraMUSCular route once for 1 dose. 8. Encounter for immunization  -     diphtheria-pertussis, acellular,-tetanus (BOOSTRIX TDAP) 2.5-8-5 Lf-mcg-Lf/0.5mL susp susp; 0.5 mL by IntraMUSCular route once for 1 dose. 9. Cardiac arrest ([facility] [address]    10. CHF, stage C [facility])- See cardiologist as directed. 11. Type 2 diabetes, controlled, with peripheral circulatory disorder (HCC)  -     HEMOGLOBIN A1C WITH EAG  -     METABOLIC PANEL, COMPREHENSIVE    12. Atherosclerosis of native coronary artery of native heart without angina pectoris- See cardiologist as directed. 13. Mixed hyperlipidemia  -     LIPID PANEL  -     CBC WITH AUTOMATED DIFF    14.  Screening for prostate cancer  -     PSA SCREENING (SCREENING)    Plan was reviewed with patient and family, understanding expressed

## 2019-01-17 NOTE — PATIENT INSTRUCTIONS
Today you had a Medicare Wellness Visit. During this visit, we developed and/or updated your personalized health plan to prevent disease and disability based on your current health and risk factors. Please schedule an appt around this time next year so we can continue to keep you on the right path to living a healthy lifestyle. Schedule of Personalized Health Plan    The best way to stay healthy is to live a healthy lifestyle. A healthy lifestyle includes regular exercise, eating a well-balanced diet, keeping a healthy weight and not smoking. Regular physical exams and screening tests are another important way to take care of yourself. Preventive exams provided by health care providers can find health problems early when treatment works best and can keep you from getting certain diseases or illnesses. Preventive services include exams, lab tests, screenings, shots, monitoring and information to help you take care of your own health. All people over 65 should have a pneumonia shot. There are 2 Pneumonia shots, given a year apart, each is usually only needed once in a lifetime unless your doctor decides differently. All people over 65 should have a yearly flu shot. People over 65 are at medium to high risk for Hepatitis B. Three shots are needed for complete protection. Talk with your provider for more details. In addition to your physical exam, some screening tests are recommended:    Diabetes Mellitus screening is recommended every year. Glaucoma is an eye disease caused by high pressure in the eye. An eye exam is recommended every year. Cardiovascular screening tests that check your cholesterol and other blood fat (lipid) levels are recommended at least every five years, depending on your personal and family history risk factors. Colorectal Cancer screening tests help to find pre-cancerous polyps (growths in the colon) so they can be removed before they turn into cancer. Tests ordered for screening depend on your personal and family history risk factors.       Here is a list of your current Health Maintenance items with a due date:  Health Maintenance   Topic Date Due    DTaP/Tdap/Td series (1 - Tdap) 01/24/1967    Shingrix Vaccine Age 50> (1 of 2) 01/24/1996    Influenza Age 5 to Adult  08/01/2018    MEDICARE YEARLY EXAM  09/06/2018    HEMOGLOBIN A1C Q6M  02/14/2019    FOOT EXAM Q1  08/08/2019    MICROALBUMIN Q1  08/14/2019    LIPID PANEL Q1  08/14/2019    GLAUCOMA SCREENING Q2Y  03/06/2020    EYE EXAM RETINAL OR DILATED  03/06/2020    COLONOSCOPY  04/19/2026    Hepatitis C Screening  Completed    Pneumococcal 65+ Low/Medium Risk  Completed

## 2019-01-18 LAB
ALBUMIN SERPL-MCNC: 4.4 G/DL (ref 3.5–4.8)
ALBUMIN/GLOB SERPL: 1.6 {RATIO} (ref 1.2–2.2)
ALP SERPL-CCNC: 97 IU/L (ref 39–117)
ALT SERPL-CCNC: 5 IU/L (ref 0–44)
AST SERPL-CCNC: 25 IU/L (ref 0–40)
BASOPHILS # BLD AUTO: 0 X10E3/UL (ref 0–0.2)
BASOPHILS NFR BLD AUTO: 1 %
BILIRUB SERPL-MCNC: 0.7 MG/DL (ref 0–1.2)
BUN SERPL-MCNC: 13 MG/DL (ref 8–27)
BUN/CREAT SERPL: 12 (ref 10–24)
CALCIUM SERPL-MCNC: 9.7 MG/DL (ref 8.6–10.2)
CHLORIDE SERPL-SCNC: 101 MMOL/L (ref 96–106)
CHOLEST SERPL-MCNC: 135 MG/DL (ref 100–199)
CO2 SERPL-SCNC: 22 MMOL/L (ref 20–29)
CREAT SERPL-MCNC: 1.06 MG/DL (ref 0.76–1.27)
EOSINOPHIL # BLD AUTO: 0 X10E3/UL (ref 0–0.4)
EOSINOPHIL NFR BLD AUTO: 1 %
ERYTHROCYTE [DISTWIDTH] IN BLOOD BY AUTOMATED COUNT: 15.2 % (ref 12.3–15.4)
EST. AVERAGE GLUCOSE BLD GHB EST-MCNC: 160 MG/DL
GLOBULIN SER CALC-MCNC: 2.7 G/DL (ref 1.5–4.5)
GLUCOSE SERPL-MCNC: 135 MG/DL (ref 65–99)
HBA1C MFR BLD: 7.2 % (ref 4.8–5.6)
HCT VFR BLD AUTO: 41 % (ref 37.5–51)
HDLC SERPL-MCNC: 39 MG/DL
HGB BLD-MCNC: 12.8 G/DL (ref 13–17.7)
IMM GRANULOCYTES # BLD AUTO: 0 X10E3/UL (ref 0–0.1)
IMM GRANULOCYTES NFR BLD AUTO: 0 %
LDLC SERPL CALC-MCNC: 78 MG/DL (ref 0–99)
LYMPHOCYTES # BLD AUTO: 1.1 X10E3/UL (ref 0.7–3.1)
LYMPHOCYTES NFR BLD AUTO: 28 %
MCH RBC QN AUTO: 25.1 PG (ref 26.6–33)
MCHC RBC AUTO-ENTMCNC: 31.2 G/DL (ref 31.5–35.7)
MCV RBC AUTO: 80 FL (ref 79–97)
MONOCYTES # BLD AUTO: 0.3 X10E3/UL (ref 0.1–0.9)
MONOCYTES NFR BLD AUTO: 7 %
NEUTROPHILS # BLD AUTO: 2.6 X10E3/UL (ref 1.4–7)
NEUTROPHILS NFR BLD AUTO: 63 %
PLATELET # BLD AUTO: 217 X10E3/UL (ref 150–379)
POTASSIUM SERPL-SCNC: 4.5 MMOL/L (ref 3.5–5.2)
PROT SERPL-MCNC: 7.1 G/DL (ref 6–8.5)
PSA SERPL-MCNC: 2.6 NG/ML (ref 0–4)
RBC # BLD AUTO: 5.1 X10E6/UL (ref 4.14–5.8)
SODIUM SERPL-SCNC: 141 MMOL/L (ref 134–144)
TRIGL SERPL-MCNC: 88 MG/DL (ref 0–149)
VLDLC SERPL CALC-MCNC: 18 MG/DL (ref 5–40)
WBC # BLD AUTO: 4 X10E3/UL (ref 3.4–10.8)

## 2019-01-18 NOTE — PROGRESS NOTES
Advised pt his labs look great overall. Continue current regimen of prescription and / or OTC medications. His cxr was normal also. His fatigue is most likely stemming from his respiratory infection. MD wants him to let us know if this isn't improving over next week. Pt's wife was also on line and she said they didn't know he had a resp infection.  Will forward to MD.

## 2019-01-18 NOTE — PROGRESS NOTES
Call- Labs look great overall , Continue current regimen of prescription and / or OTC medications . cxr was normal also. His fatigue is most likely stemming from his respiratory infection, let us know if this isn't improving over next week.

## 2019-01-19 LAB — HEMOCCULT STL QL IA: NEGATIVE

## 2019-01-20 RX ORDER — GLIPIZIDE 10 MG/1
TABLET, FILM COATED, EXTENDED RELEASE ORAL
Qty: 90 TAB | Refills: 0 | Status: SHIPPED | OUTPATIENT
Start: 2019-01-20 | End: 2019-04-20 | Stop reason: SDUPTHER

## 2019-04-02 RX ORDER — PRASUGREL 10 MG/1
TABLET, FILM COATED ORAL
Qty: 90 TAB | Refills: 1 | Status: SHIPPED | COMMUNITY
Start: 2019-04-02 | End: 2019-09-29 | Stop reason: SDUPTHER

## 2019-04-08 DIAGNOSIS — N40.0 BENIGN PROSTATIC HYPERPLASIA WITHOUT LOWER URINARY TRACT SYMPTOMS: ICD-10-CM

## 2019-04-08 RX ORDER — TAMSULOSIN HYDROCHLORIDE 0.4 MG/1
CAPSULE ORAL
Qty: 90 CAP | Refills: 3 | Status: SHIPPED | OUTPATIENT
Start: 2019-04-08 | End: 2020-04-02 | Stop reason: SDUPTHER

## 2019-04-20 RX ORDER — GLIPIZIDE 10 MG/1
TABLET, FILM COATED, EXTENDED RELEASE ORAL
Qty: 90 TAB | Refills: 0 | Status: SHIPPED | OUTPATIENT
Start: 2019-04-20 | End: 2019-07-19 | Stop reason: SDUPTHER

## 2019-06-12 RX ORDER — METFORMIN HYDROCHLORIDE 500 MG/1
TABLET ORAL
Qty: 360 TAB | Refills: 1 | Status: SHIPPED | OUTPATIENT
Start: 2019-06-12 | End: 2019-12-08 | Stop reason: SDUPTHER

## 2019-06-17 ENCOUNTER — HOSPITAL ENCOUNTER (OUTPATIENT)
Dept: GENERAL RADIOLOGY | Age: 74
Discharge: HOME | End: 2019-06-17
Attending: INTERNAL MEDICINE
Payer: MEDICARE

## 2019-06-17 ENCOUNTER — OFFICE VISIT (OUTPATIENT)
Dept: INTERNAL MEDICINE CLINIC | Age: 74
End: 2019-06-17

## 2019-06-17 VITALS
SYSTOLIC BLOOD PRESSURE: 140 MMHG | OXYGEN SATURATION: 97 % | WEIGHT: 238.4 LBS | BODY MASS INDEX: 30.6 KG/M2 | TEMPERATURE: 97.7 F | HEART RATE: 60 BPM | DIASTOLIC BLOOD PRESSURE: 72 MMHG | RESPIRATION RATE: 20 BRPM | HEIGHT: 74 IN

## 2019-06-17 DIAGNOSIS — R06.09 DOE (DYSPNEA ON EXERTION): ICD-10-CM

## 2019-06-17 DIAGNOSIS — I25.10 ATHEROSCLEROSIS OF NATIVE CORONARY ARTERY OF NATIVE HEART WITHOUT ANGINA PECTORIS: ICD-10-CM

## 2019-06-17 DIAGNOSIS — I50.9 CHF, STAGE C (HCC): Primary | ICD-10-CM

## 2019-06-17 DIAGNOSIS — E78.2 MIXED HYPERLIPIDEMIA: ICD-10-CM

## 2019-06-17 DIAGNOSIS — E11.51 TYPE 2 DIABETES, CONTROLLED, WITH PERIPHERAL CIRCULATORY DISORDER (HCC): ICD-10-CM

## 2019-06-17 DIAGNOSIS — I50.9 CHF, STAGE C (HCC): ICD-10-CM

## 2019-06-17 PROCEDURE — 71046 X-RAY EXAM CHEST 2 VIEWS: CPT

## 2019-06-17 RX ORDER — FUROSEMIDE 20 MG/1
TABLET ORAL
Qty: 1 TAB | Refills: 0
Start: 2019-06-17 | End: 2021-02-09 | Stop reason: CLARIF

## 2019-06-17 NOTE — PROGRESS NOTES
HISTORY OF PRESENT ILLNESS  Josi Garcia is a 69 y.o. male. HPI Subjective:  Mariella Bocanegra was seen today accompanied by his wife for evaluation of dyspnea on exertion and other concerns. For about the past month he has noticed significant fatigue, especially with exertion. He gets noticeably shortness of breath doing fairly minimal physical activity. He admits to some deconditioning, but his shortness of breath has definitely worsened compared to previous levels. He denies any nocturnal dyspnea, orthopnea, chest pains. He denies shortness of breath at rest.    He does have some peripheral edema, but weight is stable. ROS:  Notable for some knee pain, which increases his work of exertion, but I do not think this would attribute all of his shortness of breath. PMH:  Notable for chronic cardiac problems of CHF, CAD. He is on a low dose of Furosemide daily. Current Outpatient Medications   Medication Sig    furosemide (LASIX) 20 mg tablet TAKE 2 TABLET DAILY- new directions    metFORMIN (GLUCOPHAGE) 500 mg tablet TAKE 2 TABLETS TWICE A DAY WITH MEALS    glipiZIDE SR (GLUCOTROL XL) 10 mg CR tablet TAKE 1 TABLET DAILY (DUE FOR OFFICE VISIT PLEASE CALL OFFICE TO SCHEDULE)    tamsulosin (FLOMAX) 0.4 mg capsule TAKE 1 CAPSULE DAILY    prasugrel (EFFIENT) 10 mg tablet TAKE 1 TABLET DAILY    fosinopril (MONOPRIL) 10 mg tablet TAKE 1 TABLET DAILY    carvedilol (COREG) 6.25 mg tablet TAKE 1 TABLET TWICE A DAY WITH MEALS    spironolactone (ALDACTONE) 25 mg tablet TAKE 1 TABLET DAILY    potassium chloride SR (KLOR-CON 10) 10 mEq tablet TAKE 3 TABLETS DAILY    omeprazole (PRILOSEC) 40 mg capsule Take 1 capsule by mouth daily as needed.  rosuvastatin (CRESTOR) 10 mg tablet Take 10 mg by mouth nightly.  acetaminophen (TYLENOL EXTRA STRENGTH) 500 mg tablet Take  by mouth every six (6) hours as needed.  aspirin delayed-release 81 mg tablet Take 81 mg by mouth daily.     glucose blood VI test strips (BLOOD GLUCOSE TEST) strip "[employer] test strips. Dx .00     No current facility-administered medications for this visit. Review of Systems   Respiratory: Positive for shortness of breath. Cardiovascular: Positive for leg swelling. Negative for chest pain, orthopnea and PND. Musculoskeletal: Positive for joint pain. Physical Exam   Constitutional: No distress. Neck: Carotid bruit is not present. Cardiovascular: Normal rate. A regularly irregular rhythm present. Exam reveals no gallop and no friction rub. No murmur heard. Sounds like trigeminy   Pulmonary/Chest: Effort normal and breath sounds normal. No respiratory distress. Musculoskeletal: He exhibits edema. Mild bilateral lower extremity edema    Nursing note and vitals reviewed. ASSESSMENT and PLAN  Diagnoses and all orders for this visit:    1. CHF, stage C ([address]  -     METABOLIC PANEL, COMPREHENSIVE  -     CBC WITH AUTOMATED DIFF  -     XR CHEST PA LAT; Future  -     REFERRAL TO CARDIOLOGY- will schedule asap   -     furosemide (LASIX) 20 mg tablet; TAKE 2 TABLET DAILY- new directions    2. Type 2 diabetes, controlled, with peripheral circulatory disorder (HCC)  -     HEMOGLOBIN A1C WITH EAG    3. Atherosclerosis of native coronary artery of native heart without angina pectoris- See cardiologist as directed. 4. BRAND (dyspnea on exertion)  -     METABOLIC PANEL, COMPREHENSIVE  -     CBC WITH AUTOMATED DIFF  -     XR CHEST PA LAT; Future  -     REFERRAL TO CARDIOLOGY  -     furosemide (LASIX) 20 mg tablet; TAKE 2 TABLET DAILY- new directions    5.  Mixed hyperlipidemia  -     LIPID PANEL    Plan was reviewed with patient and family, understanding expressed

## 2019-06-17 NOTE — PROGRESS NOTES
Identified pt with two pt identifiers(name and ). Reviewed record in preparation for visit and have obtained necessary documentation. All patient medications has been reviewed. Chief Complaint   Patient presents with    Knee Pain     right knee pain x 1 week     Ankle swelling     3-4 weeks     Fatigue    Shortness of Breath     x 1 month on excertion     Skin Problem     x5 days benadryl and hydrocortisone        Health Maintenance Due   Topic    DTaP/Tdap/Td series (1 - Tdap)    HEMOGLOBIN A1C Q6M      Patient tested blood sugar this morning while fasting reading was 160   Vitals:    19 1203   BP: 140/72   Pulse: 60   Resp: 20   Temp: 97.7 °F (36.5 °C)   TempSrc: Oral   SpO2: 97%   Weight: 238 lb 6.4 oz (108.1 kg)   Height: 6' 1.5\" (1.867 m)   PainSc:   0 - No pain       Coordination of Care Questionnaire:   1) Have you been to an emergency room, urgent care, or hospitalized since your last visit?   no       2. Have seen or consulted any other health care provider since your last visit? NO    3) Do you have an Advanced Directive/ Living Will in place? YES  If yes, do we have a copy on file YES  If no, would you like information NO    Patient is accompanied by self I have received verbal consent from Trina Aldana Sr to discuss any/all medical information while they are present in the room.

## 2019-06-17 NOTE — PROGRESS NOTES
Pt. Notified of appt. 6/19/19 3p with Dr Ashley Garsia and he is retiring and will refer him to a partner. Please fax records [phone].

## 2019-06-18 LAB
ALBUMIN SERPL-MCNC: 4.1 G/DL (ref 3.5–4.8)
ALBUMIN/GLOB SERPL: 1.9 {RATIO} (ref 1.2–2.2)
ALP SERPL-CCNC: 99 IU/L (ref 39–117)
ALT SERPL-CCNC: 20 IU/L (ref 0–44)
AST SERPL-CCNC: 61 IU/L (ref 0–40)
BASOPHILS # BLD AUTO: 0 X10E3/UL (ref 0–0.2)
BASOPHILS NFR BLD AUTO: 1 %
BILIRUB SERPL-MCNC: 0.8 MG/DL (ref 0–1.2)
BUN SERPL-MCNC: 14 MG/DL (ref 8–27)
BUN/CREAT SERPL: 14 (ref 10–24)
CALCIUM SERPL-MCNC: 8.9 MG/DL (ref 8.6–10.2)
CHLORIDE SERPL-SCNC: 105 MMOL/L (ref 96–106)
CHOLEST SERPL-MCNC: 106 MG/DL (ref 100–199)
CO2 SERPL-SCNC: 22 MMOL/L (ref 20–29)
CREAT SERPL-MCNC: 0.97 MG/DL (ref 0.76–1.27)
EOSINOPHIL # BLD AUTO: 0.1 X10E3/UL (ref 0–0.4)
EOSINOPHIL NFR BLD AUTO: 1 %
ERYTHROCYTE [DISTWIDTH] IN BLOOD BY AUTOMATED COUNT: 15 % (ref 12.3–15.4)
EST. AVERAGE GLUCOSE BLD GHB EST-MCNC: 157 MG/DL
GLOBULIN SER CALC-MCNC: 2.2 G/DL (ref 1.5–4.5)
GLUCOSE SERPL-MCNC: 102 MG/DL (ref 65–99)
HBA1C MFR BLD: 7.1 % (ref 4.8–5.6)
HCT VFR BLD AUTO: 39.2 % (ref 37.5–51)
HDLC SERPL-MCNC: 37 MG/DL
HGB BLD-MCNC: 12.2 G/DL (ref 13–17.7)
IMM GRANULOCYTES # BLD AUTO: 0 X10E3/UL (ref 0–0.1)
IMM GRANULOCYTES NFR BLD AUTO: 0 %
LDLC SERPL CALC-MCNC: 59 MG/DL (ref 0–99)
LYMPHOCYTES # BLD AUTO: 1.2 X10E3/UL (ref 0.7–3.1)
LYMPHOCYTES NFR BLD AUTO: 26 %
MCH RBC QN AUTO: 24.4 PG (ref 26.6–33)
MCHC RBC AUTO-ENTMCNC: 31.1 G/DL (ref 31.5–35.7)
MCV RBC AUTO: 78 FL (ref 79–97)
MONOCYTES # BLD AUTO: 0.4 X10E3/UL (ref 0.1–0.9)
MONOCYTES NFR BLD AUTO: 9 %
NEUTROPHILS # BLD AUTO: 3 X10E3/UL (ref 1.4–7)
NEUTROPHILS NFR BLD AUTO: 63 %
PLATELET # BLD AUTO: 210 X10E3/UL (ref 150–450)
POTASSIUM SERPL-SCNC: 4.4 MMOL/L (ref 3.5–5.2)
PROT SERPL-MCNC: 6.3 G/DL (ref 6–8.5)
RBC # BLD AUTO: 5.01 X10E6/UL (ref 4.14–5.8)
SODIUM SERPL-SCNC: 143 MMOL/L (ref 134–144)
TRIGL SERPL-MCNC: 48 MG/DL (ref 0–149)
VLDLC SERPL CALC-MCNC: 10 MG/DL (ref 5–40)
WBC # BLD AUTO: 4.7 X10E3/UL (ref 3.4–10.8)

## 2019-06-18 NOTE — PROGRESS NOTES
1- call pt , Labs look great overall .  cxr also is fine  2- fax to NP or cardiologist with whom upcoming appt is with

## 2019-07-18 ENCOUNTER — OFFICE VISIT (OUTPATIENT)
Dept: INTERNAL MEDICINE CLINIC | Age: 74
End: 2019-07-18

## 2019-07-18 DIAGNOSIS — R06.09 DOE (DYSPNEA ON EXERTION): ICD-10-CM

## 2019-07-18 DIAGNOSIS — E78.2 MIXED HYPERLIPIDEMIA: ICD-10-CM

## 2019-07-18 DIAGNOSIS — I50.9 CHF, STAGE C (HCC): Primary | ICD-10-CM

## 2019-07-18 DIAGNOSIS — N40.0 BENIGN PROSTATIC HYPERPLASIA WITHOUT LOWER URINARY TRACT SYMPTOMS: ICD-10-CM

## 2019-07-18 DIAGNOSIS — E11.51 TYPE 2 DIABETES, CONTROLLED, WITH PERIPHERAL CIRCULATORY DISORDER (HCC): ICD-10-CM

## 2019-07-18 DIAGNOSIS — E11.9 ENCOUNTER FOR DIABETIC FOOT EXAM (HCC): ICD-10-CM

## 2019-07-18 DIAGNOSIS — I25.10 ATHEROSCLEROSIS OF NATIVE CORONARY ARTERY OF NATIVE HEART WITHOUT ANGINA PECTORIS: ICD-10-CM

## 2019-07-18 RX ORDER — NITROGLYCERIN 0.4 MG/1
0.4 TABLET SUBLINGUAL
Refills: 0 | COMMUNITY
Start: 2019-06-19 | End: 2021-05-25

## 2019-07-18 RX ORDER — ISOSORBIDE MONONITRATE 30 MG/1
30 TABLET, EXTENDED RELEASE ORAL DAILY
Refills: 3 | COMMUNITY
Start: 2019-07-05 | End: 2021-05-25

## 2019-07-18 RX ORDER — BUMETANIDE 2 MG/1
1 TABLET ORAL DAILY
Refills: 0 | COMMUNITY
Start: 2019-06-19 | End: 2022-02-05

## 2019-07-18 NOTE — PROGRESS NOTES
Identified pt with two pt identifiers(name and ). Reviewed record in preparation for visit and have obtained necessary documentation. All patient medications has been reviewed. Chief Complaint   Patient presents with    Follow-up     CHF, Cholesterol and Diabetes     Blood sugar problem     170 this morning not fasting        Health Maintenance Due   Topic    DTaP/Tdap/Td series (1 - Tdap)    FOOT EXAM Q1     MICROALBUMIN Q1        Vitals:    19 0925   BP: 112/64   Pulse: (!) 36   Resp: 16   Temp: 97.9 °F (36.6 °C)   TempSrc: Oral   SpO2: 97%   Weight: 232 lb 12.8 oz (105.6 kg)   Height: 6' 1.5\" (1.867 m)   PainSc:   0 - No pain       Coordination of Care Questionnaire:   1) Have you been to an emergency room, urgent care, or hospitalized since your last visit?   no       2. Have seen or consulted any other health care provider since your last visit? YES, for a heart cath. [facility] on [facility]. 3) Do you have an Advanced Directive/ Living Will in place? YES  If yes, do we have a copy on file YES  If no, would you like information NO    Patient is accompanied by self I have received verbal consent from Paola Juan  to discuss any/all medical information while they are present in the room.

## 2019-07-18 NOTE — PROGRESS NOTES
HISTORY OF PRESENT ILLNESS  Donita Altman is a 69 y.o. male. HPI Subjective:  Katy Birch is seen today accompanied by his wife for follow up of diabetes and other problems. 1. Diabetes, hyperlipidemia. Up to date with labs, which I fully reviewed with them. 2. CAD, CHF. Reviewed his stress test information. He has an EF in the 25% range. He remains on Lasix with Bumex on hold. This is used for prn use only. Apparently on his cardiac cath he had some blockages, but these will be treated medically. 3. Question bradycardia. Heart rate is normal on apical assessment. Current Outpatient Medications   Medication Sig    isosorbide mononitrate ER (IMDUR) 30 mg tablet Take 30 mg by mouth daily.  nitroglycerin (NITROSTAT) 0.4 mg SL tablet     furosemide (LASIX) 20 mg tablet TAKE 2 TABLET DAILY- new directions    metFORMIN (GLUCOPHAGE) 500 mg tablet TAKE 2 TABLETS TWICE A DAY WITH MEALS    tamsulosin (FLOMAX) 0.4 mg capsule TAKE 1 CAPSULE DAILY    prasugrel (EFFIENT) 10 mg tablet TAKE 1 TABLET DAILY    fosinopril (MONOPRIL) 10 mg tablet TAKE 1 TABLET DAILY    carvedilol (COREG) 6.25 mg tablet TAKE 1 TABLET TWICE A DAY WITH MEALS    spironolactone (ALDACTONE) 25 mg tablet TAKE 1 TABLET DAILY    potassium chloride SR (KLOR-CON 10) 10 mEq tablet TAKE 3 TABLETS DAILY (Patient taking differently: Take 10 mEq by mouth two (2) times a day.)    omeprazole (PRILOSEC) 40 mg capsule Take 1 capsule by mouth daily as needed.  rosuvastatin (CRESTOR) 10 mg tablet Take 10 mg by mouth nightly.  glucose blood VI test strips (BLOOD GLUCOSE TEST) strip [employer] test strips. Dx .00 (Patient taking differently: [employer] brand test strips. Dx .00  Indications: RARELY)    acetaminophen (TYLENOL EXTRA STRENGTH) 500 mg tablet Take  by mouth every six (6) hours as needed.  aspirin delayed-release 81 mg tablet Take 81 mg by mouth daily.     glipiZIDE SR (GLUCOTROL XL) 10 mg CR tablet TAKE 1 TABLET DAILY (DUE FOR OFFICE VISIT PLEASE CALL OFFICE TO SCHEDULE)    bumetanide (BUMEX) 2 mg tablet Take 2 mg by mouth two (2) times a day. No current facility-administered medications for this visit. Review of Systems   Constitutional: Negative for weight loss. Respiratory: Positive for shortness of breath. Better   Cardiovascular: Negative for chest pain, palpitations, leg swelling and PND. Musculoskeletal: Negative for myalgias. Neurological: Negative for focal weakness. Physical Exam   Constitutional: No distress. Neck: Carotid bruit is not present. Cardiovascular: Normal rate. A regularly irregular rhythm present. Exam reveals no gallop and no friction rub. No murmur heard. Bigeminy, hr=70   Pulmonary/Chest: Effort normal and breath sounds normal. No respiratory distress. Musculoskeletal: He exhibits no edema. Feet without lesion or ulcer    Nursing note and vitals reviewed. ASSESSMENT and PLAN  Diagnoses and all orders for this visit:    1. CHF, stage C [facility])- See cardiologist as directed. Proceed with plan as discussed     2. Type 2 diabetes, controlled, with peripheral circulatory disorder (HCC)  -     MICROALBUMIN, UR, RAND W/ MICROALB/CREAT RATIO  -     URINALYSIS W/ RFLX MICROSCOPIC    3. Atherosclerosis of native coronary artery of native heart without angina pectoris- See cardiologist as directed. 4. BRAND (dyspnea on exertion)- Continue current regimen of prescription and / or OTC medications , See cardiologist as directed. 5. Mixed hyperlipidemia- Check lipid panel and appropriate studies to rule out med side effects in 6 months. High risk med management. 6. Benign prostatic hyperplasia without lower urinary tract symptoms- Continue current regimen of prescription and / or OTC medications     7.  Encounter for diabetic foot exam ([facility] [address]  -      DIABETES FOOT EXAM

## 2019-07-19 LAB
ALBUMIN/CREAT UR: 29.9 MG/G CREAT (ref 0–30)
APPEARANCE UR: CLEAR
BILIRUB UR QL STRIP: NEGATIVE
COLOR UR: YELLOW
CREAT UR-MCNC: 166.4 MG/DL
GLUCOSE UR QL: NEGATIVE
HGB UR QL STRIP: NEGATIVE
KETONES UR QL STRIP: NEGATIVE
LEUKOCYTE ESTERASE UR QL STRIP: NEGATIVE
MICRO URNS: NORMAL
MICROALBUMIN UR-MCNC: 49.8 UG/ML
NITRITE UR QL STRIP: NEGATIVE
PH UR STRIP: 5 [PH] (ref 5–7.5)
PROT UR QL STRIP: NEGATIVE
SP GR UR: 1.02 (ref 1–1.03)
UROBILINOGEN UR STRIP-MCNC: 0.2 MG/DL (ref 0.2–1)

## 2019-07-19 RX ORDER — GLIPIZIDE 10 MG/1
TABLET, FILM COATED, EXTENDED RELEASE ORAL
Qty: 90 TAB | Refills: 0 | Status: SHIPPED | OUTPATIENT
Start: 2019-07-19 | End: 2019-10-17 | Stop reason: SDUPTHER

## 2019-07-21 VITALS
WEIGHT: 232.8 LBS | DIASTOLIC BLOOD PRESSURE: 64 MMHG | BODY MASS INDEX: 29.88 KG/M2 | HEIGHT: 74 IN | OXYGEN SATURATION: 97 % | RESPIRATION RATE: 16 BRPM | SYSTOLIC BLOOD PRESSURE: 112 MMHG | TEMPERATURE: 97.9 F | HEART RATE: 70 BPM

## 2019-07-22 ENCOUNTER — TELEPHONE (OUTPATIENT)
Dept: INTERNAL MEDICINE CLINIC | Age: 74
End: 2019-07-22

## 2019-07-22 DIAGNOSIS — I50.9 CHF, STAGE C (HCC): Primary | ICD-10-CM

## 2019-07-22 NOTE — TELEPHONE ENCOUNTER
Advised pt MD forgot to give lab slip to him at his office visit. Pt requested we mail lab slip to him - done.

## 2019-07-22 NOTE — TELEPHONE ENCOUNTER
----- Message from Brando Cadet MD sent at 7/21/2019  1:05 PM EDT -----  Regarding: acs  Call pt- I forgot to give lab form. Mail form for BMP, or place up front, see his preference.  Dx is chf

## 2019-07-26 LAB
BUN SERPL-MCNC: 13 MG/DL (ref 8–27)
BUN/CREAT SERPL: 13 (ref 10–24)
CALCIUM SERPL-MCNC: 9.2 MG/DL (ref 8.6–10.2)
CHLORIDE SERPL-SCNC: 101 MMOL/L (ref 96–106)
CO2 SERPL-SCNC: 23 MMOL/L (ref 20–29)
CREAT SERPL-MCNC: 0.97 MG/DL (ref 0.76–1.27)
GLUCOSE SERPL-MCNC: 127 MG/DL (ref 65–99)
POTASSIUM SERPL-SCNC: 4.7 MMOL/L (ref 3.5–5.2)
SODIUM SERPL-SCNC: 141 MMOL/L (ref 134–144)

## 2019-07-26 NOTE — PROGRESS NOTES
Call- labs is fine, Continue current regimen of prescription and / or OTC medications , specifically the furosemide/Lasix

## 2019-07-26 NOTE — PROGRESS NOTES
Advised pt his labs are fine. Continue current regimen of prescription and / or OTC medications - specifically the furosemide/Lasix.

## 2019-08-20 RX ORDER — POTASSIUM CHLORIDE 750 MG/1
10 TABLET, FILM COATED, EXTENDED RELEASE ORAL 2 TIMES DAILY
Qty: 180 TAB | Refills: 1 | Status: SHIPPED | COMMUNITY
Start: 2019-08-20 | End: 2020-02-16 | Stop reason: SDUPTHER

## 2019-08-20 NOTE — TELEPHONE ENCOUNTER
Patient states he takes twice a day. Patient is out of med so could you please send a small amount to [facility] to cover while waiting for mail order to arrive.

## 2019-09-29 RX ORDER — PRASUGREL 10 MG/1
TABLET, FILM COATED ORAL
Qty: 90 TAB | Refills: 4 | Status: SHIPPED | OUTPATIENT
Start: 2019-09-29 | End: 2021-05-25

## 2019-10-17 RX ORDER — GLIPIZIDE 10 MG/1
TABLET, FILM COATED, EXTENDED RELEASE ORAL
Qty: 90 TAB | Refills: 4 | Status: SHIPPED | OUTPATIENT
Start: 2019-10-17 | End: 2021-01-11

## 2019-10-19 RX ORDER — SPIRONOLACTONE 25 MG/1
TABLET ORAL
Qty: 90 TAB | Refills: 4 | Status: SHIPPED | OUTPATIENT
Start: 2019-10-19 | End: 2021-02-09 | Stop reason: ALTCHOICE

## 2019-10-19 RX ORDER — CARVEDILOL 6.25 MG/1
TABLET ORAL
Qty: 180 TAB | Refills: 4 | Status: SHIPPED | OUTPATIENT
Start: 2019-10-19 | End: 2021-01-11

## 2019-12-08 RX ORDER — METFORMIN HYDROCHLORIDE 500 MG/1
TABLET ORAL
Qty: 360 TAB | Refills: 4 | Status: SHIPPED | OUTPATIENT
Start: 2019-12-08 | End: 2021-03-03

## 2019-12-14 RX ORDER — FOSINOPIRL SODIUM 10 MG/1
TABLET ORAL
Qty: 90 TAB | Refills: 4 | Status: SHIPPED | OUTPATIENT
Start: 2019-12-14 | End: 2021-03-08

## 2020-01-22 ENCOUNTER — OFFICE VISIT (OUTPATIENT)
Dept: INTERNAL MEDICINE CLINIC | Age: 75
End: 2020-01-22

## 2020-01-22 VITALS
HEIGHT: 74 IN | RESPIRATION RATE: 16 BRPM | WEIGHT: 232.6 LBS | BODY MASS INDEX: 29.85 KG/M2 | HEART RATE: 42 BPM | SYSTOLIC BLOOD PRESSURE: 127 MMHG | DIASTOLIC BLOOD PRESSURE: 67 MMHG | TEMPERATURE: 97.2 F | OXYGEN SATURATION: 100 %

## 2020-01-22 DIAGNOSIS — I25.10 ATHEROSCLEROSIS OF NATIVE CORONARY ARTERY OF NATIVE HEART WITHOUT ANGINA PECTORIS: ICD-10-CM

## 2020-01-22 DIAGNOSIS — E11.51 TYPE 2 DIABETES, CONTROLLED, WITH PERIPHERAL CIRCULATORY DISORDER (HCC): Primary | ICD-10-CM

## 2020-01-22 DIAGNOSIS — E78.2 MIXED HYPERLIPIDEMIA: ICD-10-CM

## 2020-01-22 DIAGNOSIS — I50.9 CHF, STAGE C (HCC): ICD-10-CM

## 2020-01-22 DIAGNOSIS — Z23 ENCOUNTER FOR IMMUNIZATION: ICD-10-CM

## 2020-01-22 RX ORDER — RANOLAZINE 500 MG/1
500 TABLET, EXTENDED RELEASE ORAL 2 TIMES DAILY
Qty: 1 TAB | Refills: 0
Start: 2020-01-22 | End: 2021-05-25

## 2020-01-22 NOTE — Clinical Note
HISTORY OF PRESENT ILLNESS Jeffery Moreno is a 69 y.o. male. HPI Subjective:  Manpreet Lamb is seen today for follow up of hypertension and other problems. He is accompanied by his wife, Tram Diallo. 1. Hypertension, stable on current regimen. 2. Diabetes, hyperlipidemia. Due for routine labs. 3. CHF with CAD. He follows up with cardiology. He had been having some fatigue and shortness of breath. Ranexa was added and he does feel better with this. No interventional studies are planned for now. 4. Preventive medicine. Generally taken care of at the [place]. Current Outpatient Medications Medication Sig  
 ranolazine ER (RANEXA) 500 mg SR tablet Take 1 Tab by mouth two (2) times a day. Per cardiology  fosinopril (MONOPRIL) 10 mg tablet TAKE 1 TABLET DAILY  metFORMIN (GLUCOPHAGE) 500 mg tablet TAKE 2 TABLETS TWICE A DAY WITH MEALS  spironolactone (ALDACTONE) 25 mg tablet TAKE 1 TABLET DAILY  carvedilol (COREG) 6.25 mg tablet TAKE 1 TABLET TWICE A DAY WITH MEALS  glipiZIDE SR (GLUCOTROL XL) 10 mg CR tablet TAKE 1 TABLET DAILY (DUE FOR OFFICE VISIT PLEASE CALL OFFICE TO SCHEDULE)  prasugrel (EFFIENT) 10 mg tablet TAKE 1 TABLET DAILY  potassium chloride SR (KLOR-CON 10) 10 mEq tablet Take 1 Tab by mouth two (2) times a day.  bumetanide (BUMEX) 2 mg tablet Take 2 mg by mouth two (2) times a day. 1/2 tab every other day.  isosorbide mononitrate ER (IMDUR) 30 mg tablet Take 30 mg by mouth daily.  nitroglycerin (NITROSTAT) 0.4 mg SL tablet  furosemide (LASIX) 20 mg tablet TAKE 2 TABLET DAILY- new directions  tamsulosin (FLOMAX) 0.4 mg capsule TAKE 1 CAPSULE DAILY  omeprazole (PRILOSEC) 40 mg capsule Take 1 capsule by mouth daily as needed.  rosuvastatin (CRESTOR) 10 mg tablet Take 10 mg by mouth nightly.  glucose blood VI test strips (BLOOD GLUCOSE TEST) strip [employer] test strips.  Dx .00 (Patient taking differently: [employer] brand test strips. Dx .00  Indications: RARELY)  acetaminophen (TYLENOL EXTRA STRENGTH) 500 mg tablet Take  by mouth every six (6) hours as needed.  aspirin delayed-release 81 mg tablet Take 81 mg by mouth daily. No current facility-administered medications for this visit. Review of Systems Constitutional: Negative for weight loss. Respiratory: Positive for shortness of breath. Cardiovascular: Negative for chest pain, palpitations, leg swelling and PND. Musculoskeletal: Negative for myalgias. Neurological: Negative for focal weakness. Physical Exam 
Vitals signs and nursing note reviewed. Constitutional:   
   General: He is not in acute distress. Neck:  
   Vascular: No carotid bruit. Cardiovascular:  
   Rate and Rhythm: Normal rate. Rhythm regularly irregular. Heart sounds: No murmur. No friction rub. No gallop. Comments: Hr nl apically Pulmonary:  
   Effort: Pulmonary effort is normal. No respiratory distress. Breath sounds: Normal breath sounds. ASSESSMENT and PLAN Diagnoses and all orders for this visit: 
 
1. Type 2 diabetes, controlled, with peripheral circulatory disorder ([facility] [address] -     METABOLIC PANEL, BASIC 
-     HEMOGLOBIN A1C WITH EAG 2. Mixed hyperlipidemia -     LIPID PANEL 
-     HEPATIC FUNCTION PANEL 
-     CBC WITH AUTOMATED DIFF 
-     ranolazine ER (RANEXA) 500 mg SR tablet; Take 1 Tab by mouth two (2) times a day. Per cardiology 3. Encounter for immunization 4. CHF, stage C (HCC) 
-     ranolazine ER (RANEXA) 500 mg SR tablet; Take 1 Tab by mouth two (2) times a day. Per cardiology 5. Atherosclerosis of native coronary artery of native heart without angina pectoris 
-     ranolazine ER (RANEXA) 500 mg SR tablet; Take 1 Tab by mouth two (2) times a day. Per cardiology 1 person assist

## 2020-01-22 NOTE — PROGRESS NOTES
HISTORY OF PRESENT ILLNESS Manolo Garvey is a 69 y.o. male. HPI  Dictation on: 01/24/2020  4:52 PM by: Cholo Bliss [[id number]] Current Outpatient Medications Medication Sig  
 ranolazine ER (RANEXA) 500 mg SR tablet Take 1 Tab by mouth two (2) times a day. Per cardiology  fosinopril (MONOPRIL) 10 mg tablet TAKE 1 TABLET DAILY  metFORMIN (GLUCOPHAGE) 500 mg tablet TAKE 2 TABLETS TWICE A DAY WITH MEALS  spironolactone (ALDACTONE) 25 mg tablet TAKE 1 TABLET DAILY  carvedilol (COREG) 6.25 mg tablet TAKE 1 TABLET TWICE A DAY WITH MEALS  glipiZIDE SR (GLUCOTROL XL) 10 mg CR tablet TAKE 1 TABLET DAILY (DUE FOR OFFICE VISIT PLEASE CALL OFFICE TO SCHEDULE)  prasugrel (EFFIENT) 10 mg tablet TAKE 1 TABLET DAILY  potassium chloride SR (KLOR-CON 10) 10 mEq tablet Take 1 Tab by mouth two (2) times a day.  bumetanide (BUMEX) 2 mg tablet Take 2 mg by mouth two (2) times a day. 1/2 tab every other day.  isosorbide mononitrate ER (IMDUR) 30 mg tablet Take 30 mg by mouth daily.  nitroglycerin (NITROSTAT) 0.4 mg SL tablet  furosemide (LASIX) 20 mg tablet TAKE 2 TABLET DAILY- new directions  tamsulosin (FLOMAX) 0.4 mg capsule TAKE 1 CAPSULE DAILY  omeprazole (PRILOSEC) 40 mg capsule Take 1 capsule by mouth daily as needed.  rosuvastatin (CRESTOR) 10 mg tablet Take 10 mg by mouth nightly.  glucose blood VI test strips (BLOOD GLUCOSE TEST) strip [employer] test strips. Dx .00 (Patient taking differently: [employer] brand test strips. Dx .00  Indications: RARELY)  acetaminophen (TYLENOL EXTRA STRENGTH) 500 mg tablet Take  by mouth every six (6) hours as needed.  aspirin delayed-release 81 mg tablet Take 81 mg by mouth daily. No current facility-administered medications for this visit. Review of Systems Constitutional: Negative for weight loss. Respiratory: Positive for shortness of breath. Cardiovascular: Negative for chest pain, palpitations, leg swelling and PND. Musculoskeletal: Negative for myalgias. Neurological: Negative for focal weakness. Physical Exam 
Vitals signs and nursing note reviewed. Constitutional:   
   General: He is not in acute distress. Neck:  
   Vascular: No carotid bruit. Cardiovascular:  
   Rate and Rhythm: Normal rate. Rhythm regularly irregular. Heart sounds: No murmur. No friction rub. No gallop. Comments: Hr nl apically Pulmonary:  
   Effort: Pulmonary effort is normal. No respiratory distress. Breath sounds: Normal breath sounds. ASSESSMENT and PLAN Diagnoses and all orders for this visit: 
 
1. Type 2 diabetes, controlled, with peripheral circulatory disorder ([facility] [address] -     METABOLIC PANEL, BASIC 
-     HEMOGLOBIN A1C WITH EAG 2. Mixed hyperlipidemia -     LIPID PANEL 
-     HEPATIC FUNCTION PANEL 
-     CBC WITH AUTOMATED DIFF 
-     ranolazine ER (RANEXA) 500 mg SR tablet; Take 1 Tab by mouth two (2) times a day. Per cardiology 3. Encounter for immunization 4. CHF, stage C (HCC) 
-     ranolazine ER (RANEXA) 500 mg SR tablet; Take 1 Tab by mouth two (2) times a day. Per cardiology 5. Atherosclerosis of native coronary artery of native heart without angina pectoris 
-     ranolazine ER (RANEXA) 500 mg SR tablet; Take 1 Tab by mouth two (2) times a day. Per cardiology

## 2020-01-28 LAB
ALBUMIN SERPL-MCNC: 4.4 G/DL (ref 3.7–4.7)
ALP SERPL-CCNC: 136 IU/L (ref 39–117)
ALT SERPL-CCNC: 8 IU/L (ref 0–44)
AST SERPL-CCNC: 36 IU/L (ref 0–40)
BASOPHILS # BLD AUTO: 0 X10E3/UL (ref 0–0.2)
BASOPHILS NFR BLD AUTO: 1 %
BILIRUB DIRECT SERPL-MCNC: 0.24 MG/DL (ref 0–0.4)
BILIRUB SERPL-MCNC: 0.6 MG/DL (ref 0–1.2)
BUN SERPL-MCNC: 15 MG/DL (ref 8–27)
BUN/CREAT SERPL: 16 (ref 10–24)
CALCIUM SERPL-MCNC: 9.5 MG/DL (ref 8.6–10.2)
CHLORIDE SERPL-SCNC: 102 MMOL/L (ref 96–106)
CHOLEST SERPL-MCNC: 130 MG/DL (ref 100–199)
CO2 SERPL-SCNC: 22 MMOL/L (ref 20–29)
CREAT SERPL-MCNC: 0.96 MG/DL (ref 0.76–1.27)
EOSINOPHIL # BLD AUTO: 0.1 X10E3/UL (ref 0–0.4)
EOSINOPHIL NFR BLD AUTO: 1 %
ERYTHROCYTE [DISTWIDTH] IN BLOOD BY AUTOMATED COUNT: 15.3 % (ref 11.6–15.4)
EST. AVERAGE GLUCOSE BLD GHB EST-MCNC: 160 MG/DL
GLUCOSE SERPL-MCNC: 111 MG/DL (ref 65–99)
HBA1C MFR BLD: 7.2 % (ref 4.8–5.6)
HCT VFR BLD AUTO: 39.5 % (ref 37.5–51)
HDLC SERPL-MCNC: 46 MG/DL
HGB BLD-MCNC: 12.3 G/DL (ref 13–17.7)
IMM GRANULOCYTES # BLD AUTO: 0 X10E3/UL (ref 0–0.1)
IMM GRANULOCYTES NFR BLD AUTO: 0 %
LDLC SERPL CALC-MCNC: 69 MG/DL (ref 0–99)
LYMPHOCYTES # BLD AUTO: 1.1 X10E3/UL (ref 0.7–3.1)
LYMPHOCYTES NFR BLD AUTO: 27 %
MCH RBC QN AUTO: 24.3 PG (ref 26.6–33)
MCHC RBC AUTO-ENTMCNC: 31.1 G/DL (ref 31.5–35.7)
MCV RBC AUTO: 78 FL (ref 79–97)
MONOCYTES # BLD AUTO: 0.5 X10E3/UL (ref 0.1–0.9)
MONOCYTES NFR BLD AUTO: 11 %
NEUTROPHILS # BLD AUTO: 2.6 X10E3/UL (ref 1.4–7)
NEUTROPHILS NFR BLD AUTO: 60 %
PLATELET # BLD AUTO: 197 X10E3/UL (ref 150–450)
POTASSIUM SERPL-SCNC: 4.4 MMOL/L (ref 3.5–5.2)
PROT SERPL-MCNC: 7 G/DL (ref 6–8.5)
RBC # BLD AUTO: 5.06 X10E6/UL (ref 4.14–5.8)
SODIUM SERPL-SCNC: 141 MMOL/L (ref 134–144)
TRIGL SERPL-MCNC: 74 MG/DL (ref 0–149)
VLDLC SERPL CALC-MCNC: 15 MG/DL (ref 5–40)
WBC # BLD AUTO: 4.3 X10E3/UL (ref 3.4–10.8)

## 2020-02-16 RX ORDER — POTASSIUM CHLORIDE 750 MG/1
TABLET, FILM COATED, EXTENDED RELEASE ORAL
Qty: 180 TAB | Refills: 4 | Status: SHIPPED | OUTPATIENT
Start: 2020-02-16 | End: 2021-02-09

## 2020-04-02 DIAGNOSIS — N40.0 BENIGN PROSTATIC HYPERPLASIA WITHOUT LOWER URINARY TRACT SYMPTOMS: ICD-10-CM

## 2020-04-02 RX ORDER — TAMSULOSIN HYDROCHLORIDE 0.4 MG/1
CAPSULE ORAL
Qty: 90 CAP | Refills: 3 | Status: SHIPPED | OUTPATIENT
Start: 2020-04-02 | End: 2021-03-28

## 2020-06-15 DIAGNOSIS — I50.9 CHF, STAGE C (HCC): ICD-10-CM

## 2020-06-15 DIAGNOSIS — E11.51 TYPE 2 DIABETES, CONTROLLED, WITH PERIPHERAL CIRCULATORY DISORDER (HCC): Primary | ICD-10-CM

## 2020-06-15 DIAGNOSIS — E78.2 MIXED HYPERLIPIDEMIA: ICD-10-CM

## 2020-06-15 RX ORDER — IBUPROFEN 200 MG
CAPSULE ORAL
Status: CANCELLED | OUTPATIENT
Start: 2020-06-15

## 2020-06-16 RX ORDER — LANCETS
EACH MISCELLANEOUS
Qty: 100 EACH | Refills: 11 | Status: SHIPPED | OUTPATIENT
Start: 2020-06-16 | End: 2022-05-06

## 2020-06-18 ENCOUNTER — TELEPHONE (OUTPATIENT)
Dept: INTERNAL MEDICINE CLINIC | Age: 75
End: 2020-06-18

## 2020-06-18 DIAGNOSIS — E11.51 TYPE 2 DIABETES, CONTROLLED, WITH PERIPHERAL CIRCULATORY DISORDER (HCC): ICD-10-CM

## 2020-06-18 NOTE — TELEPHONE ENCOUNTER
[address] [facility] (Pharmacy) [phone]     ref# [id number]    Requesting verification on what type of lancets on script sent to them

## 2020-06-19 ENCOUNTER — TELEPHONE (OUTPATIENT)
Dept: INTERNAL MEDICINE CLINIC | Age: 75
End: 2020-06-19

## 2020-06-19 NOTE — TELEPHONE ENCOUNTER
[phone]  Reference : [id number]      Please send in a script for a One touch delica lancet devise

## 2020-06-20 RX ORDER — IBUPROFEN 200 MG
CAPSULE ORAL
Qty: 100 STRIP | Refills: 11 | Status: SHIPPED | OUTPATIENT
Start: 2020-06-20 | End: 2021-02-09 | Stop reason: SDUPTHER

## 2020-07-01 ENCOUNTER — TELEPHONE (OUTPATIENT)
Dept: INTERNAL MEDICINE CLINIC | Age: 75
End: 2020-07-01

## 2020-07-01 NOTE — TELEPHONE ENCOUNTER
Pt  Wife called said that pt is not feeling well.  I advised her that we are not seeing anyone that has symptoms of covid    [phone]

## 2020-07-02 NOTE — TELEPHONE ENCOUNTER
Spoke with pt - states he was his cardiologist Dr Antoni Marcus NP today. Had been having chest pain. Changed his medications. He is feeling fine.

## 2020-07-30 RX ORDER — ROSUVASTATIN CALCIUM 10 MG/1
10 TABLET, COATED ORAL
Qty: 90 TAB | Refills: 0 | Status: SHIPPED | OUTPATIENT
Start: 2020-07-30 | End: 2020-11-01

## 2021-02-09 ENCOUNTER — OFFICE VISIT (OUTPATIENT)
Dept: INTERNAL MEDICINE CLINIC | Age: 76
End: 2021-02-09
Payer: MEDICARE

## 2021-02-09 VITALS
BODY MASS INDEX: 28.67 KG/M2 | OXYGEN SATURATION: 100 % | HEIGHT: 74 IN | DIASTOLIC BLOOD PRESSURE: 79 MMHG | RESPIRATION RATE: 20 BRPM | TEMPERATURE: 97.5 F | HEART RATE: 72 BPM | WEIGHT: 223.4 LBS | SYSTOLIC BLOOD PRESSURE: 124 MMHG

## 2021-02-09 DIAGNOSIS — E78.2 MIXED HYPERLIPIDEMIA: ICD-10-CM

## 2021-02-09 DIAGNOSIS — E11.51 TYPE 2 DIABETES, CONTROLLED, WITH PERIPHERAL CIRCULATORY DISORDER (HCC): ICD-10-CM

## 2021-02-09 DIAGNOSIS — I50.9 CHF, STAGE C (HCC): ICD-10-CM

## 2021-02-09 DIAGNOSIS — N40.0 BENIGN PROSTATIC HYPERPLASIA WITHOUT LOWER URINARY TRACT SYMPTOMS: ICD-10-CM

## 2021-02-09 DIAGNOSIS — Z13.31 SCREENING FOR DEPRESSION: ICD-10-CM

## 2021-02-09 DIAGNOSIS — Z12.5 SCREENING FOR PROSTATE CANCER: ICD-10-CM

## 2021-02-09 DIAGNOSIS — I25.10 ATHEROSCLEROSIS OF NATIVE CORONARY ARTERY OF NATIVE HEART WITHOUT ANGINA PECTORIS: ICD-10-CM

## 2021-02-09 DIAGNOSIS — E11.9 ENCOUNTER FOR DIABETIC FOOT EXAM (HCC): ICD-10-CM

## 2021-02-09 DIAGNOSIS — Z00.00 MEDICARE ANNUAL WELLNESS VISIT, SUBSEQUENT: Primary | ICD-10-CM

## 2021-02-09 LAB
ALBUMIN SERPL-MCNC: 3.9 G/DL (ref 3.5–5)
ALBUMIN/GLOB SERPL: 1.1 {RATIO} (ref 1.1–2.2)
ALP SERPL-CCNC: 151 U/L (ref 45–117)
ALT SERPL-CCNC: 22 U/L (ref 12–78)
ANION GAP SERPL CALC-SCNC: 9 MMOL/L (ref 5–15)
APPEARANCE UR: CLEAR
AST SERPL-CCNC: 67 U/L (ref 15–37)
BACTERIA URNS QL MICRO: NEGATIVE /HPF
BASOPHILS # BLD: 0 K/UL (ref 0–0.1)
BASOPHILS NFR BLD: 1 % (ref 0–1)
BILIRUB DIRECT SERPL-MCNC: 0.4 MG/DL (ref 0–0.2)
BILIRUB SERPL-MCNC: 0.9 MG/DL (ref 0.2–1)
BILIRUB UR QL: NEGATIVE
BUN SERPL-MCNC: 18 MG/DL (ref 6–20)
BUN/CREAT SERPL: 15 (ref 12–20)
CALCIUM SERPL-MCNC: 9.8 MG/DL (ref 8.5–10.1)
CHLORIDE SERPL-SCNC: 105 MMOL/L (ref 97–108)
CHOLEST SERPL-MCNC: 116 MG/DL
CO2 SERPL-SCNC: 27 MMOL/L (ref 21–32)
COLOR UR: ABNORMAL
CREAT SERPL-MCNC: 1.19 MG/DL (ref 0.7–1.3)
CREAT UR-MCNC: 222 MG/DL
DIFFERENTIAL METHOD BLD: ABNORMAL
EOSINOPHIL # BLD: 0.1 K/UL (ref 0–0.4)
EOSINOPHIL NFR BLD: 2 % (ref 0–7)
EPITH CASTS URNS QL MICRO: ABNORMAL /LPF
ERYTHROCYTE [DISTWIDTH] IN BLOOD BY AUTOMATED COUNT: 16.7 % (ref 11.5–14.5)
GLOBULIN SER CALC-MCNC: 3.4 G/DL (ref 2–4)
GLUCOSE SERPL-MCNC: 131 MG/DL (ref 65–100)
GLUCOSE UR STRIP.AUTO-MCNC: NEGATIVE MG/DL
HCT VFR BLD AUTO: 38.4 % (ref 36.6–50.3)
HDLC SERPL-MCNC: 48 MG/DL
HDLC SERPL: 2.4 {RATIO} (ref 0–5)
HGB BLD-MCNC: 11.6 G/DL (ref 12.1–17)
HGB UR QL STRIP: NEGATIVE
IMM GRANULOCYTES # BLD AUTO: 0 K/UL (ref 0–0.04)
IMM GRANULOCYTES NFR BLD AUTO: 0 % (ref 0–0.5)
KETONES UR QL STRIP.AUTO: NEGATIVE MG/DL
LDLC SERPL CALC-MCNC: 56.6 MG/DL (ref 0–100)
LEUKOCYTE ESTERASE UR QL STRIP.AUTO: ABNORMAL
LIPID PROFILE,FLP: NORMAL
LYMPHOCYTES # BLD: 1.2 K/UL (ref 0.8–3.5)
LYMPHOCYTES NFR BLD: 25 % (ref 12–49)
MCH RBC QN AUTO: 24.1 PG (ref 26–34)
MCHC RBC AUTO-ENTMCNC: 30.2 G/DL (ref 30–36.5)
MCV RBC AUTO: 79.8 FL (ref 80–99)
MICROALBUMIN UR-MCNC: 24.8 MG/DL
MICROALBUMIN/CREAT UR-RTO: 112 MG/G (ref 0–30)
MONOCYTES # BLD: 0.5 K/UL (ref 0–1)
MONOCYTES NFR BLD: 10 % (ref 5–13)
MUCOUS THREADS URNS QL MICRO: ABNORMAL /LPF
NEUTS SEG # BLD: 3 K/UL (ref 1.8–8)
NEUTS SEG NFR BLD: 62 % (ref 32–75)
NITRITE UR QL STRIP.AUTO: NEGATIVE
NRBC # BLD: 0 K/UL (ref 0–0.01)
NRBC BLD-RTO: 0 PER 100 WBC
PH UR STRIP: 5 [PH] (ref 5–8)
PLATELET # BLD AUTO: 174 K/UL (ref 150–400)
PMV BLD AUTO: 12.1 FL (ref 8.9–12.9)
POTASSIUM SERPL-SCNC: 4.6 MMOL/L (ref 3.5–5.1)
PROT SERPL-MCNC: 7.3 G/DL (ref 6.4–8.2)
PROT UR STRIP-MCNC: 30 MG/DL
PSA SERPL-MCNC: 2.4 NG/ML (ref 0.01–4)
RBC # BLD AUTO: 4.81 M/UL (ref 4.1–5.7)
RBC #/AREA URNS HPF: ABNORMAL /HPF (ref 0–5)
SODIUM SERPL-SCNC: 141 MMOL/L (ref 136–145)
SP GR UR REFRACTOMETRY: 1.03 (ref 1–1.03)
TRIGL SERPL-MCNC: 57 MG/DL (ref ?–150)
TSH SERPL DL<=0.05 MIU/L-ACNC: 1.41 UIU/ML (ref 0.36–3.74)
UROBILINOGEN UR QL STRIP.AUTO: 0.2 EU/DL (ref 0.2–1)
VLDLC SERPL CALC-MCNC: 11.4 MG/DL
WBC # BLD AUTO: 4.8 K/UL (ref 4.1–11.1)
WBC URNS QL MICRO: ABNORMAL /HPF (ref 0–4)

## 2021-02-09 PROCEDURE — G8427 DOCREV CUR MEDS BY ELIG CLIN: HCPCS | Performed by: INTERNAL MEDICINE

## 2021-02-09 PROCEDURE — 1101F PT FALLS ASSESS-DOCD LE1/YR: CPT | Performed by: INTERNAL MEDICINE

## 2021-02-09 PROCEDURE — G8536 NO DOC ELDER MAL SCRN: HCPCS | Performed by: INTERNAL MEDICINE

## 2021-02-09 PROCEDURE — G8419 CALC BMI OUT NRM PARAM NOF/U: HCPCS | Performed by: INTERNAL MEDICINE

## 2021-02-09 PROCEDURE — 3017F COLORECTAL CA SCREEN DOC REV: CPT | Performed by: INTERNAL MEDICINE

## 2021-02-09 PROCEDURE — G0439 PPPS, SUBSEQ VISIT: HCPCS | Performed by: INTERNAL MEDICINE

## 2021-02-09 PROCEDURE — 2022F DILAT RTA XM EVC RTNOPTHY: CPT | Performed by: INTERNAL MEDICINE

## 2021-02-09 PROCEDURE — G8510 SCR DEP NEG, NO PLAN REQD: HCPCS | Performed by: INTERNAL MEDICINE

## 2021-02-09 PROCEDURE — 3044F HG A1C LEVEL LT 7.0%: CPT | Performed by: INTERNAL MEDICINE

## 2021-02-09 RX ORDER — EPLERENONE 25 MG/1
25 TABLET, FILM COATED ORAL DAILY
COMMUNITY
End: 2021-05-25

## 2021-02-09 NOTE — PATIENT INSTRUCTIONS
Medicare Wellness Visit, Male    The best way to live healthy is to have a lifestyle where you eat a well-balanced diet, exercise regularly, limit alcohol use, and quit all forms of tobacco/nicotine, if applicable. Regular preventive services are another way to keep healthy. Preventive services (vaccines, screening tests, monitoring & exams) can help personalize your care plan, which helps you manage your own care. Screening tests can find health problems at the earliest stages, when they are easiest to treat. [facility] follows the current, evidence-based guidelines published by the [facility] ([facility]) when recommending preventive services for our patients. Because we follow these guidelines, sometimes recommendations change over time as research supports it. (For example, a prostate screening blood test is no longer routinely recommended for men with no symptoms). Of course, you and your doctor may decide to screen more often for some diseases, based on your risk and co-morbidities (chronic disease you are already diagnosed with). Preventive services for you include:  - Medicare offers their members a free annual wellness visit, which is time for you and your primary care provider to discuss and plan for your preventive service needs. Take advantage of this benefit every year!  -All adults over age 72 should receive the recommended pneumonia vaccines. Current USPSTF guidelines recommend a series of two vaccines for the best pneumonia protection.   -All adults should have a flu vaccine yearly and tetanus vaccine every 10 years.  -All adults age 48 and older should receive the shingles vaccines (series of two vaccines).        -All adults age 38-68 who are overweight should have a diabetes screening test once every three years.   -Other screening tests & preventive services for persons with diabetes include: an eye exam to screen for diabetic retinopathy, a kidney function test, a foot exam, and stricter control over your cholesterol.   -Cardiovascular screening for adults with routine risk involves an electrocardiogram (ECG) at intervals determined by the provider.   -Colorectal cancer screening should be done for adults age 54-65 with no increased risk factors for colorectal cancer. There are a number of acceptable methods of screening for this type of cancer. Each test has its own benefits and drawbacks. Discuss with your provider what is most appropriate for you during your annual wellness visit. The different tests include: colonoscopy (considered the best screening method), a fecal occult blood test, a fecal DNA test, and sigmoidoscopy.  -All adults born between [facility] should be screened once for Hepatitis C.  -An Abdominal Aortic Aneurysm (AAA) Screening is recommended for men age 73-68 who has ever smoked in their lifetime.      Here is a list of your current Health Maintenance items (your personalized list of preventive services) with a due date:  Health Maintenance Due   Topic Date Due    Shingles Vaccine (1 of 2) 01/24/1996    Glaucoma Screening   03/06/2020    Eye Exam  03/06/2020    Diabetic Foot Care  07/18/2020    Albumin Urine Test  07/18/2020    Hemoglobin A1C    07/27/2020    Yearly Flu Vaccine (1) 09/01/2020    Cholesterol Test   01/27/2021

## 2021-02-09 NOTE — PROGRESS NOTES
This is the Subsequent Medicare Annual Wellness Exam, performed 12 months or more after the Initial AWV or the last Subsequent AWV    I have reviewed the patient's medical history in detail and updated the computerized patient record. Depression Risk Factor Screening:     3 most recent PHQ Screens 2/9/2021   Little interest or pleasure in doing things Not at all   Feeling down, depressed, irritable, or hopeless Not at all   Total Score PHQ 2 0       Alcohol Risk Screen    Do you average more than 1 drink per night or more than 7 drinks a week: No- rare    In the past three months have you have had more than 4 drinks containing alcohol on one occasion: No        Functional Ability and Level of Safety:    Hearing: loss is mild      Activities of Daily Living: The home contains: no safety equipment. Patient does total self care      Ambulation: with difficulty, can't walk further than half blocks     Fall Risk:  Fall Risk Assessment, last 12 mths 2/9/2021   Able to walk? Yes   Fall in past 12 months? 1   Do you feel unsteady? 0   Are you worried about falling 0   Number of falls in past 12 months 1   Fall with injury? 0      Abuse Screen:  Patient is not abused       Cognitive Screening    Has your family/caregiver stated any concerns about your memory: no         Assessment/Plan   Education and counseling provided:  Are appropriate based on today's review and evaluation    Diagnoses and all orders for this visit:    1. Medicare annual wellness visit, subsequent    2.  Screening for depression  -     [place] Maintenance Due     Health Maintenance Due   Topic Date Due    Shingrix Vaccine Age 49> (1 of 2) 01/24/1996    GLAUCOMA SCREENING Q2Y  03/06/2020    Eye Exam Retinal or Dilated  03/06/2020    Foot Exam Q1  07/18/2020    MICROALBUMIN Q1  07/18/2020    A1C test (Diabetic or Prediabetic)  07/27/2020    Flu Vaccine (1) 09/01/2020    Lipid Screen  01/27/2021       Patient Care Team   Patient Care Team:  Nelly Vasquez MD as PCP - Reinier Hannon MD as PCP - [facility]  Sunny Davis MD as Physician (Cardiology)  Thomas Garvey MD as Physician (Cardiology)  Pamella Tariq MD as Physician (Gastroenterology)  Cb Beltran MD as Physician (Orthopedic Surgery)  Carla Renteria MD as Physician (Ophthalmology)  Awanda Moll Wilms, MD as Physician (General Practice)    History     Patient Active Problem List   Diagnosis Code    Calculus of kidney N20.0    Coronary atherosclerosis of native coronary artery I25.10    Benign neoplasm of colon D12.6    Unspecified sleep apnea G47.30    Reflux esophagitis K21.00    Encounter for long-term (current) use of other medications Z79.899    Insomnia, unspecified G47.00    Cardiac arrest ([facility] I46.9    Hematuria, gross R31.0    BPH without obstruction/lower urinary tract symptoms N40.0    Proteinuria R80.9    CHF, stage C ([address] I50.9    Mixed hyperlipidemia E78.2    Type 2 diabetes, controlled, with peripheral circulatory disorder (HCC) E11.51    Active advance directive on file Z78.9     Past Medical History:   Diagnosis Date    CAD (coronary artery disease) '90  '97, '13 x 2    MI, code ice in 2013 at [facility]    Calculus of kidney     Colonic polyps     Congestive heart failure, unspecified     Diabetes ([facility] [address] [facility]     Gastritis     Hypercholesterolemia     Sleep apnea       Past Surgical History:   Procedure Laterality Date    CARDIAC SURG PROCEDURE UNLIST  2012    x 4 vessels    COLONOSCOPY  04/06/2011    16, due 21    ENDOSCOPY, COLON, DIAGNOSTIC      11, due 16    HX CORONARY ARTERY BYPASS GRAFT  8/22/12    x 4 vessel by S.  James    HX HEENT      LASIK    HX PACEMAKER PLACEMENT  1/30/13     Current Outpatient Medications   Medication Sig Dispense Refill    carvediloL (COREG) 6.25 mg tablet TAKE 1 TABLET TWICE A DAY WITH MEALS 60 Tab 0    ranolazine ER (RANEXA) 500 mg SR tablet Take 1 Tab by mouth two (2) times a day. Per cardiology 1 Tab 0    fosinopril (MONOPRIL) 10 mg tablet TAKE 1 TABLET DAILY 90 Tab 4    metFORMIN (GLUCOPHAGE) 500 mg tablet TAKE 2 TABLETS TWICE A DAY WITH MEALS 360 Tab 4    bumetanide (BUMEX) 2 mg tablet Take  by mouth. 1/2 tab every other day.  0    acetaminophen (TYLENOL EXTRA STRENGTH) 500 mg tablet Take  by mouth every six (6) hours as needed.  aspirin delayed-release 81 mg tablet Take 81 mg by mouth daily.  rosuvastatin (CRESTOR) 10 mg tablet TAKE 1 TABLET NIGHTLY 90 Tab 0    glipiZIDE SR (GLUCOTROL XL) 10 mg CR tablet TAKE 1 TABLET DAILY (DUE FOR OFFICE VISIT PLEASE CALL OFFICE TO SCHEDULE) 30 Tab 0    glucose blood VI test strips (blood glucose test) strip [employer] test strips. Dx .00 100 Strip 11    glucose blood VI test strips (OneTouch Ultra Blue Test Strip) strip Use to test blood sugar daily 100 Strip 11    lancets misc Use to test blood sugar daily 100 Each 11    tamsulosin (FLOMAX) 0.4 mg capsule TAKE 1 CAPSULE DAILY 90 Cap 3    potassium chloride SR (KLOR-CON 10) 10 mEq tablet TAKE 1 TABLET TWICE A  Tab 4    spironolactone (ALDACTONE) 25 mg tablet TAKE 1 TABLET DAILY 90 Tab 4    prasugrel (EFFIENT) 10 mg tablet TAKE 1 TABLET DAILY 90 Tab 4    isosorbide mononitrate ER (IMDUR) 30 mg tablet Take 30 mg by mouth daily. 3    nitroglycerin (NITROSTAT) 0.4 mg SL tablet   0    furosemide (LASIX) 20 mg tablet TAKE 2 TABLET DAILY- new directions 1 Tab 0    omeprazole (PRILOSEC) 40 mg capsule Take 1 capsule by mouth daily as needed.  1 capsule 0     Allergies   Allergen Reactions    Pcn [Penicillins] Hives       Family History   Problem Relation Age of Onset    Heart Disease Mother         MI    Heart Disease Father         CAD & PVD    Lung Disease Father     Cancer Father         lung    Diabetes Maternal Grandmother     Heart Disease Other         CAD    Stroke Sister      Social History     Tobacco Use    Smoking status: Never Smoker    Smokeless tobacco: Never Used   Substance Use Topics    Alcohol use:  Yes     Alcohol/week: 0.0 standard drinks     Comment:  VERY RARE

## 2021-02-09 NOTE — PROGRESS NOTES
HISTORY OF PRESENT ILLNESS  Gila Martinez is a 77 y.o. male. HPI  Assessment. Lito Cavazos is seen today accompanied by his wife for a Medicare Wellness Visit and overdue follow up of chronic problems including diabetes. He is doing fair. Preventive Medicine. Fully reviewed, see attached Wellness note. He is due for the shingles vaccine, second COVID vaccine and labs. He is up to date with eye exam.     Colorectal cancer screening not indicated given his other multiple severe problems. Chronic Problems Reviewed. He has coronary artery disease and congestive heart failure. He gets winded with very little activity with decreased stamina. It sounds like consideration of LVAD was made but he is very hesitant. He will follow up with cardiology as directed. We will rule out other causes of decreased stamina such as anemia and thyroid dysfunction. Diabetes and cholesterol are due and we will adjust medications as needed. Plan was reviewed with patient and family, understanding expressed     Review of Systems   Constitutional: Negative for chills, fever and weight loss. HENT: Positive for hearing loss. Negative for sore throat. Respiratory: Positive for shortness of breath. Cardiovascular: Negative for chest pain, palpitations, leg swelling and PND. Gastrointestinal: Negative for blood in stool and melena. Genitourinary: Positive for frequency. Musculoskeletal: Positive for falls. Negative for myalgias. Neurological: Positive for weakness. Negative for focal weakness. Endo/Heme/Allergies: Does not bruise/bleed easily. Physical Exam  Vitals signs and nursing note reviewed. Constitutional:       General: He is not in acute distress. Appearance: He is well-developed. HENT:      Head: Normocephalic and atraumatic.       Right Ear: Tympanic membrane, ear canal and external ear normal.      Left Ear: Tympanic membrane, ear canal and external ear normal.   Eyes:      General: Right eye: No discharge. Left eye: No discharge. Pupils: Pupils are equal, round, and reactive to light. Neck:      Musculoskeletal: Normal range of motion and neck supple. Thyroid: No thyromegaly. Vascular: No carotid bruit. Cardiovascular:      Rate and Rhythm: Normal rate and regular rhythm. Pulses:           Dorsalis pedis pulses are 1+ on the right side and 1+ on the left side. Posterior tibial pulses are 1+ on the right side and 1+ on the left side. Heart sounds: Normal heart sounds. No murmur. No friction rub. No gallop. Pulmonary:      Effort: Pulmonary effort is normal. No respiratory distress. Breath sounds: Normal breath sounds. No wheezing or rales. Abdominal:      General: Bowel sounds are normal. There is no distension. Palpations: Abdomen is soft. There is no mass. Tenderness: There is no abdominal tenderness. There is no guarding or rebound. Musculoskeletal: Normal range of motion. General: No tenderness. Feet:      Right foot:      Protective Sensation: 3 sites tested. 2 sites sensed. Skin integrity: Skin integrity normal. No ulcer. Toenail Condition: Right toenails are abnormally thick. Fungal disease present. Left foot:      Protective Sensation: 3 sites tested. 3 sites sensed. Skin integrity: Skin integrity normal. No ulcer. Toenail Condition: Left toenails are abnormally thick. Fungal disease present. Lymphadenopathy:      Cervical: No cervical adenopathy. Skin:     General: Skin is warm and dry. Findings: No rash. Neurological:      Mental Status: He is alert and oriented to person, place, and time. Deep Tendon Reflexes: Reflexes are normal and symmetric. Psychiatric:         Behavior: Behavior normal.         ASSESSMENT and PLAN  Diagnoses and all orders for this visit:    1. Medicare annual wellness visit, subsequent    2.  Screening for depression  -     DEPRESSION SCREEN ANNUAL    3. Type 2 diabetes, controlled, with peripheral circulatory disorder (HCC)  -     METABOLIC PANEL, BASIC; Future  -     URINALYSIS W/ RFLX MICROSCOPIC; Future  -     MICROALBUMIN, UR, RAND W/ MICROALB/CREAT RATIO; Future    4. Mixed hyperlipidemia  -     CBC WITH AUTOMATED DIFF; Future  -     LIPID PANEL; Future  -     HEPATIC FUNCTION PANEL; Future  -     TSH 3RD GENERATION; Future    5. [facility] stage C ([facility]    6. Benign prostatic hyperplasia without lower urinary tract symptoms    7. Atherosclerosis of native coronary artery of native heart without angina pectoris    8. Encounter for diabetic foot exam ([facility]  -      DIABETES FOOT EXAM    9. Screening for prostate cancer  -     PSA SCREENING (SCREENING);  Future    Other orders  -     HEMOGLOBIN A1C WITH EAG

## 2021-02-11 LAB
EST. AVERAGE GLUCOSE BLD GHB EST-MCNC: 140 MG/DL
HBA1C MFR BLD: 6.5 % (ref 4–5.6)

## 2021-02-15 NOTE — PROGRESS NOTES
Called [facility] - spoke with Yanet Plunkett. Advised her MD wants to add on ggt and cpk. She states lab too old to add these on - good for 5 days.  Will forward to MD.

## 2021-02-16 DIAGNOSIS — D50.8 OTHER IRON DEFICIENCY ANEMIA: ICD-10-CM

## 2021-02-16 DIAGNOSIS — R74.8 ABNORMAL AST AND ALT: Primary | ICD-10-CM

## 2021-02-16 NOTE — PROGRESS NOTES
Advised pt his labs look great overall including excellent diabetes and cholesterol. 3 things are noted to be slightly abnormal.   1. Protein in urine - small amount - no problem likely - MD will follow. 2. Slight abnormality of enzyme test could be liver or muscle. Return for GGT, cpk, dx abnormal AST. No fasting required, find out where he like to go for testing - he will come here. 3. Very slight low blood count. Check iron profile dx unspecified iron deficiency anemia. Lab slip at . Pt will return today.

## 2021-02-16 NOTE — PROGRESS NOTES
Call- Labs look great overall , including excellent diabetes and cholesterol. 3 things are noted to be slightly abnormal.  1. Protein in urine- small amount, no problem likely, will follow  2. Slight abnormality of enzyme test, could be liver or muscle. Return for GGT, cpk, dx = abnormal AST. No fasting required, find out where he like to go for testing  3. Very slight low blood count. Check iron profile, dx= unspecified iron deficiency anemia.

## 2021-02-22 ENCOUNTER — TELEPHONE (OUTPATIENT)
Dept: INTERNAL MEDICINE CLINIC | Age: 76
End: 2021-02-22

## 2021-02-22 DIAGNOSIS — Z12.11 COLON CANCER SCREENING: ICD-10-CM

## 2021-02-22 DIAGNOSIS — D50.9 IRON DEFICIENCY ANEMIA, UNSPECIFIED IRON DEFICIENCY ANEMIA TYPE: ICD-10-CM

## 2021-02-22 DIAGNOSIS — R74.8 ELEVATED SERUM GGT LEVEL: Primary | ICD-10-CM

## 2021-02-22 DIAGNOSIS — R94.5 ABNORMAL LIVER FUNCTION: Primary | ICD-10-CM

## 2021-02-22 NOTE — PROGRESS NOTES
Orders Placed This Encounter    GGT     Standing Status:   Future     Standing Expiration Date:   2/22/2022    CBC WITH AUTOMATED DIFF     Standing Status:   Future     Standing Expiration Date:   2/22/2022    OCCULT BLOOD IMMUNOASSAY,DIAGNOSTIC     Standing Status:   Future     Standing Expiration Date:   2/22/2022     The above orders were approved via [facility] per Dr. Quiroga [facility].

## 2021-02-22 NOTE — TELEPHONE ENCOUNTER
Reviewed lab -   - check abd US for inc GGT  - check stool for OB  - carol labs in one month  - other Labs look great overall , Continue current regimen of prescription and / or OTC medications

## 2021-02-23 ENCOUNTER — HOSPITAL ENCOUNTER (OUTPATIENT)
Dept: LAB | Age: 76
Discharge: HOME | End: 2021-02-23
Payer: MEDICARE

## 2021-02-23 PROCEDURE — 82270 OCCULT BLOOD FECES: CPT

## 2021-02-24 LAB — HEMOCCULT STL QL IA: NEGATIVE

## 2021-03-03 RX ORDER — METFORMIN HYDROCHLORIDE 500 MG/1
TABLET ORAL
Qty: 360 TAB | Refills: 3 | Status: SHIPPED | OUTPATIENT
Start: 2021-03-03 | End: 2021-05-25

## 2021-03-08 RX ORDER — FOSINOPIRL SODIUM 10 MG/1
TABLET ORAL
Qty: 90 TAB | Refills: 3 | Status: SHIPPED | OUTPATIENT
Start: 2021-03-08 | End: 2021-05-25

## 2021-03-09 ENCOUNTER — TRANSCRIBE ORDER (OUTPATIENT)
Dept: SCHEDULING | Age: 76
End: 2021-03-09

## 2021-03-09 NOTE — PROGRESS NOTES
Advised pt stool OB is negative for blood. Asked pt have anyone called to schedule his US yet? Pt states no one has called.  Will forward to MD.

## 2021-03-09 NOTE — PROGRESS NOTES
Called scheduled pt for his ultrasound abdomen at [facility] on 3/12/21 at 9 am. Pt is to arrive by 8:30 am and NPO after midnight. Pt is aware of this appt and instructions.

## 2021-03-11 ENCOUNTER — VIRTUAL VISIT (OUTPATIENT)
Dept: INTERNAL MEDICINE CLINIC | Age: 76
End: 2021-03-11
Payer: MEDICARE

## 2021-03-11 ENCOUNTER — TELEPHONE (OUTPATIENT)
Dept: INTERNAL MEDICINE CLINIC | Age: 76
End: 2021-03-11

## 2021-03-11 DIAGNOSIS — K52.9 GASTROENTERITIS: Primary | ICD-10-CM

## 2021-03-11 PROCEDURE — 99213 OFFICE O/P EST LOW 20 MIN: CPT | Performed by: INTERNAL MEDICINE

## 2021-03-11 PROCEDURE — 1101F PT FALLS ASSESS-DOCD LE1/YR: CPT | Performed by: INTERNAL MEDICINE

## 2021-03-11 PROCEDURE — G8510 SCR DEP NEG, NO PLAN REQD: HCPCS | Performed by: INTERNAL MEDICINE

## 2021-03-11 PROCEDURE — G8427 DOCREV CUR MEDS BY ELIG CLIN: HCPCS | Performed by: INTERNAL MEDICINE

## 2021-03-11 PROCEDURE — 3017F COLORECTAL CA SCREEN DOC REV: CPT | Performed by: INTERNAL MEDICINE

## 2021-03-11 NOTE — PROGRESS NOTES
HISTORY OF PRESENT ILLNESS  Pedrito Lindsay is a 77 y.o. male. This is a real-time audio/video visit brought to you by our sponsors, the fine folks at [facility] [place] and [employer] platform   Cough  The history is provided by the patient and spouse. The current episode started 2 days ago. Pertinent negatives include no chest pain and no abdominal pain. URI   The history is provided by the patient and spouse. The current episode started 12 to 24 hours ago. There has been no fever. Associated symptoms include diarrhea, nausea, vomiting and cough. Pertinent negatives include no chest pain and no abdominal pain. He has tried nothing (has had both COVID vaccine) for the symptoms. Review of Systems   Constitutional: Negative for weight loss. Respiratory: Positive for cough. Cardiovascular: Negative for chest pain, palpitations, leg swelling and PND. Gastrointestinal: Positive for diarrhea, nausea and vomiting. Negative for abdominal pain. Musculoskeletal: Negative for myalgias. Neurological: Positive for dizziness. Negative for focal weakness. 3-4 weeks, orthostatic       Physical Exam    ASSESSMENT and PLAN  Diagnoses and all orders for this visit:    1.  Gastroenteritis  - BRAT diet, liquids, rest.  - [facility].   - If above negative and feeling ok may keep his US appt for tomorrow    Plan was reviewed with patient and family, understanding expressed

## 2021-03-11 NOTE — TELEPHONE ENCOUNTER
Gay Meredith (Self) [phone] (H)     pt's wife is requesting a call back regarding her husand's breathing problem

## 2021-03-11 NOTE — TELEPHONE ENCOUNTER
Patient's wife states they got a call from our office and when she answered, it got disconnected. She would still like a callback from [place] about her 's cough -- in the meantime, I scheduled him for a doxy. me this afternoon at 5pm just in case.

## 2021-03-12 ENCOUNTER — HOSPITAL ENCOUNTER (OUTPATIENT)
Dept: ULTRASOUND IMAGING | Age: 76
Discharge: HOME | End: 2021-03-12
Attending: INTERNAL MEDICINE
Payer: MEDICARE

## 2021-03-12 DIAGNOSIS — R94.5 ABNORMAL LIVER FUNCTION: ICD-10-CM

## 2021-03-12 PROCEDURE — 76700 US EXAM ABDOM COMPLETE: CPT

## 2021-03-12 NOTE — TELEPHONE ENCOUNTER
ON CALL NOTE:   Pt wife calls and reports pt seen virtually today by Dr Hayes Maya and was referred to urgent care for covid test. He has been diagnosed with pneumonia and she wants to know if he should have the US scheduled for tomorrow done. Counseled if covid test is neg can go but if not feeling well should cancel and contact Dr Hayes Maya regarding the next steps. He does not know his covid test result yet.

## 2021-03-15 ENCOUNTER — TELEPHONE (OUTPATIENT)
Dept: INTERNAL MEDICINE CLINIC | Age: 76
End: 2021-03-15

## 2021-03-15 NOTE — PROGRESS NOTES
Advised pt and pt's wife Lori South shows a gallstone and some fatty liver. Neither need any intervention. Just be aware of pain below the right rib cage usually after eating.

## 2021-03-15 NOTE — TELEPHONE ENCOUNTER
Spoke with pt to follow up on his [facility] visit. He was seen on 3/13/21. Pt states he is feeling better today. He was given cough medication and Doxycycline 100 mg bid x10 days. No sob or fever today. Advised him to continue to rest and complete antibiotic. Call if symptoms worsens. Also spoke with pt's wife Gia Preciado. Advised her speaking with pt he states he's feeling better today. Does not sound as he needs to go to hospital. Advised as I did to pt to call if symptoms worsens. Will forward to MD.    [address] [place] [address] location for records - spoke with [facility]. She will fax today.

## 2021-03-15 NOTE — TELEPHONE ENCOUNTER
Please call patient's wife concerning 's trip to [facility] in [place], as directed by Dr. Makayla Blackwell.   He was told he has pneumonia -- they are concerned and wonder if he needs to be in the hospital.

## 2021-03-22 ENCOUNTER — TELEPHONE (OUTPATIENT)
Dept: INTERNAL MEDICINE CLINIC | Age: 76
End: 2021-03-22

## 2021-03-22 DIAGNOSIS — J06.9 UPPER RESPIRATORY TRACT INFECTION, UNSPECIFIED TYPE: Primary | ICD-10-CM

## 2021-03-22 DIAGNOSIS — J06.9 UPPER RESPIRATORY TRACT INFECTION, UNSPECIFIED TYPE: ICD-10-CM

## 2021-03-22 RX ORDER — CODEINE PHOSPHATE AND GUAIFENESIN 10; 100 MG/5ML; MG/5ML
10 SOLUTION ORAL
Qty: 200 ML | Refills: 1 | Status: SHIPPED | OUTPATIENT
Start: 2021-03-22 | End: 2021-03-22 | Stop reason: SDUPTHER

## 2021-03-22 RX ORDER — CODEINE PHOSPHATE AND GUAIFENESIN 10; 100 MG/5ML; MG/5ML
10 SOLUTION ORAL
Qty: 200 ML | Refills: 1 | Status: SHIPPED | OUTPATIENT
Start: 2021-03-22 | End: 2021-04-05

## 2021-03-22 NOTE — TELEPHONE ENCOUNTER
Patient's wife called back to check on her request from this morning for cough med for . It was sent, but went to [facility] instead of local pharmacy. Please send to [facility] on file so she can pick it up for him today.

## 2021-03-22 NOTE — TELEPHONE ENCOUNTER
Little Mckenzie, patient's wife says her  was up coughing all night last night, so he does need a refill of the cough med. Says you had asked him and he declined before, but does need it. It was prescribed by [facility] -- I was unable to pull it up to request the refill.

## 2021-03-28 DIAGNOSIS — N40.0 BENIGN PROSTATIC HYPERPLASIA WITHOUT LOWER URINARY TRACT SYMPTOMS: ICD-10-CM

## 2021-03-28 RX ORDER — TAMSULOSIN HYDROCHLORIDE 0.4 MG/1
CAPSULE ORAL
Qty: 90 CAP | Refills: 3 | Status: ON HOLD | OUTPATIENT
Start: 2021-03-28 | End: 2022-03-02 | Stop reason: SDUPTHER

## 2021-04-05 ENCOUNTER — TELEPHONE (OUTPATIENT)
Dept: INTERNAL MEDICINE CLINIC | Age: 76
End: 2021-04-05

## 2021-04-05 NOTE — TELEPHONE ENCOUNTER
I called and spoke with Mr. Oconnell. He states that he had his follow up CXR at [facility] and thinks it was better (in follow up to his recent pneumonia). He states that he is feeling better also. He apparently is going to be referred to a Hematologist by someone at [facility] d/t some abnormal labs. He does not know yet which hematologist because they are supposed to call him back with that information. Pt will notify this office with specifics once he receives that call. I told him I would inform Dr. Brumfield of what was going on.

## 2021-04-05 NOTE — TELEPHONE ENCOUNTER
Please have Dr. Nanci Cote call back if possible about a hematologist referral given from someone else. Also wanted to let the Dr. Dominique Springer he has pneumonia.

## 2021-04-13 NOTE — TELEPHONE ENCOUNTER
Called [facility] - requested records from last visit. Received and on MD's desk to review.  Will forward to MD.

## 2021-04-26 ENCOUNTER — OFFICE VISIT (OUTPATIENT)
Dept: INTERNAL MEDICINE CLINIC | Age: 76
End: 2021-04-26
Payer: MEDICARE

## 2021-04-26 VITALS
HEIGHT: 74 IN | SYSTOLIC BLOOD PRESSURE: 129 MMHG | HEART RATE: 76 BPM | WEIGHT: 218.8 LBS | RESPIRATION RATE: 20 BRPM | BODY MASS INDEX: 28.08 KG/M2 | OXYGEN SATURATION: 100 % | DIASTOLIC BLOOD PRESSURE: 89 MMHG | TEMPERATURE: 97.1 F

## 2021-04-26 DIAGNOSIS — R11.0 NAUSEA: Primary | ICD-10-CM

## 2021-04-26 DIAGNOSIS — I50.9 CHF, STAGE C (HCC): ICD-10-CM

## 2021-04-26 PROCEDURE — 99214 OFFICE O/P EST MOD 30 MIN: CPT | Performed by: INTERNAL MEDICINE

## 2021-04-26 PROCEDURE — G8536 NO DOC ELDER MAL SCRN: HCPCS | Performed by: INTERNAL MEDICINE

## 2021-04-26 PROCEDURE — G8419 CALC BMI OUT NRM PARAM NOF/U: HCPCS | Performed by: INTERNAL MEDICINE

## 2021-04-26 PROCEDURE — 3017F COLORECTAL CA SCREEN DOC REV: CPT | Performed by: INTERNAL MEDICINE

## 2021-04-26 PROCEDURE — G8427 DOCREV CUR MEDS BY ELIG CLIN: HCPCS | Performed by: INTERNAL MEDICINE

## 2021-04-26 PROCEDURE — G8432 DEP SCR NOT DOC, RNG: HCPCS | Performed by: INTERNAL MEDICINE

## 2021-04-26 PROCEDURE — 1101F PT FALLS ASSESS-DOCD LE1/YR: CPT | Performed by: INTERNAL MEDICINE

## 2021-04-26 RX ORDER — OMEPRAZOLE 20 MG/1
20 CAPSULE, DELAYED RELEASE ORAL DAILY
Qty: 30 CAP | Refills: 1 | Status: SHIPPED | OUTPATIENT
Start: 2021-04-26 | End: 2021-07-06

## 2021-04-26 RX ORDER — ROSUVASTATIN CALCIUM 10 MG/1
TABLET, COATED ORAL
Qty: 90 TAB | Refills: 3 | Status: ON HOLD | OUTPATIENT
Start: 2021-04-26 | End: 2022-04-21 | Stop reason: SDUPTHER

## 2021-04-26 RX ORDER — LANOLIN ALCOHOL/MO/W.PET/CERES
CREAM (GRAM) TOPICAL
COMMUNITY
End: 2021-06-02 | Stop reason: ALTCHOICE

## 2021-04-26 NOTE — PROGRESS NOTES
HISTORY OF PRESENT ILLNESS  Sheela Romo is a 77 y.o. male. HPI  Assessment. Stephanie Alfaro is seen today accompanied by his wife Christophe Jamil for evaluation of nausea and fatigue. 1) Nausea. This became pronounced since treatment for pneumonia in early March. He followed up with [facility]. He brings me the records of his three visits there in summary form. The second follow up indicates a repeat chest x-ray was obtained. There is no result. Stehpanie Alfaro says the pneumonia had resolved. He had antibiotics. His nausea is nonspecific. He denies vomiting. He just does not have much appetite and the thought of eating turns his stomach. We have decided to try a probiotic and Prilosec for two weeks. If the symptoms persist, we will proceed with GI evaluation with Dr. Maryan Lazar. 2) Fatigue. This is more longstanding. He has severe congestive heart failure with an EF in the 20% range. They are not really sure when he saw cardiology last. My last documented note is over a year old but they are fairly certain they have been there since. It sounds as if the cardiologist did suggest some more aggressive treatments and Stephanie Alfaro was not interested. I did mention to that the [address] [place] [address] with [facility] and they were very eager to get a second opinion. I think this is a reasonable approach as I think a lot of his chronic symptoms are from congestive heart failure. His current nausea picture may be aggravated by the low ejection fraction as well. 3) They are concerned about the finding of anemia but he has had a hematology consultation. He did not have iron deficiency and was guaiac negative on stool testing. Review of Systems   Constitutional: Positive for malaise/fatigue. Respiratory: Positive for shortness of breath. Gastrointestinal: Positive for nausea. Physical Exam  Vitals signs and nursing note reviewed. Constitutional:       General: He is not in acute distress.   Cardiovascular:      Rate and Rhythm: Normal rate and regular rhythm. Heart sounds: No murmur. No friction rub. No gallop. Pulmonary:      Effort: Pulmonary effort is normal.      Breath sounds: Normal breath sounds. Abdominal:      General: Bowel sounds are normal. There is no distension. Tenderness: There is no guarding or rebound. ASSESSMENT and PLAN  Diagnoses and all orders for this visit:    1. Nausea  -     omeprazole (PRILOSEC) 20 mg capsule; Take 1 Cap by mouth daily. Indications: indigestion    2.  CHF, stage C ([address]  -     REFERRAL TO CARDIOLOGY

## 2021-04-30 ENCOUNTER — VIRTUAL VISIT (OUTPATIENT)
Dept: INTERNAL MEDICINE CLINIC | Age: 76
End: 2021-04-30
Payer: MEDICARE

## 2021-04-30 DIAGNOSIS — R11.2 NON-INTRACTABLE VOMITING WITH NAUSEA, UNSPECIFIED VOMITING TYPE: ICD-10-CM

## 2021-04-30 DIAGNOSIS — R11.0 NAUSEA: Primary | ICD-10-CM

## 2021-04-30 PROCEDURE — G8432 DEP SCR NOT DOC, RNG: HCPCS | Performed by: INTERNAL MEDICINE

## 2021-04-30 PROCEDURE — 3017F COLORECTAL CA SCREEN DOC REV: CPT | Performed by: INTERNAL MEDICINE

## 2021-04-30 PROCEDURE — 99214 OFFICE O/P EST MOD 30 MIN: CPT | Performed by: INTERNAL MEDICINE

## 2021-04-30 PROCEDURE — G8427 DOCREV CUR MEDS BY ELIG CLIN: HCPCS | Performed by: INTERNAL MEDICINE

## 2021-04-30 PROCEDURE — 1101F PT FALLS ASSESS-DOCD LE1/YR: CPT | Performed by: INTERNAL MEDICINE

## 2021-04-30 RX ORDER — ONDANSETRON 4 MG/1
4 TABLET, FILM COATED ORAL
Qty: 20 TAB | Refills: 1 | Status: SHIPPED | OUTPATIENT
Start: 2021-04-30 | End: 2022-01-11 | Stop reason: ALTCHOICE

## 2021-04-30 NOTE — PROGRESS NOTES
HISTORY OF PRESENT ILLNESS  Suzette Feng is a 77 y.o. male. This is a real-time audio/video visit brought to you by our sponsors, the fine folks at [facility] and [employer]. Me home, they home. Vomiting   The history is provided by the patient and spouse (see previous note 4/26, suffering with nausea for one month +). This is a new problem. The current episode started yesterday. The problem occurs 2 to 4 times per day (today everything he's tried). The problem has been gradually worsening. The emesis has an appearance of stomach contents. There has been no fever. Pertinent negatives include no chills, no fever, no abdominal pain and no diarrhea. His past medical history is significant for DM. His pertinent negatives include no irritable bowel syndrome and no inflammatory bowel disease. Past medical history comments: severe CHF. Review of Systems   Constitutional: Negative for chills and fever. Gastrointestinal: Positive for vomiting. Negative for abdominal pain and diarrhea. Physical Exam  Vitals signs and nursing note reviewed. Constitutional:       General: He is not in acute distress. Appearance: He is ill-appearing. He is not toxic-appearing. Pulmonary:      Effort: Pulmonary effort is normal.   Neurological:      General: No focal deficit present. Mental Status: Mental status is at baseline. ASSESSMENT and PLAN  Diagnoses and all orders for this visit:    1. Nausea  -     REFERRAL TO GASTROENTEROLOGY- first available ok  -     ondansetron hcl (ZOFRAN) 4 mg tablet; Take 1 Tab by mouth every eight (8) hours as needed for Nausea, Vomiting or Nausea or Vomiting.  -     METABOLIC PANEL, COMPREHENSIVE; Future  -     CBC WITH AUTOMATED DIFF; Future    2. Non-intractable vomiting with nausea, unspecified vomiting type  -     ondansetron hcl (ZOFRAN) 4 mg tablet;  Take 1 Tab by mouth every eight (8) hours as needed for Nausea, Vomiting or Nausea or Vomiting.  - METABOLIC PANEL, COMPREHENSIVE; Future  -     CBC WITH AUTOMATED DIFF; Future  - ER if persists     3. DIABETES- hold OHG meds if BG < 100    Plan was reviewed with patient and family, understanding expressed

## 2021-05-03 ENCOUNTER — TELEPHONE (OUTPATIENT)
Dept: INTERNAL MEDICINE CLINIC | Age: 76
End: 2021-05-03

## 2021-05-03 NOTE — TELEPHONE ENCOUNTER
Reviewed lab - stable  Except slight increase in alk phos. N/V is better, cough has returned however. GI set for this week.   - change Zofran to prn  - continue every day omeprazole  - will send records  - try Mucinex DM for cough

## 2021-05-04 ENCOUNTER — TELEPHONE (OUTPATIENT)
Dept: INTERNAL MEDICINE CLINIC | Age: 76
End: 2021-05-04

## 2021-05-04 NOTE — TELEPHONE ENCOUNTER
Spoke with pt's wife Sharon Coleman. She states pt read that Zofran and Iron can cause constipation. He is taking both. He has been constipated for several days. Pt wants to know what he can take to help him? Advised MD of pt's complaint. MD advised pt to stop iron supplement now (removed from pt med list) and Zofran should only be prn. He is to take dose of MOM now. Repeat in morning if no results. Pt to call office tomorrow afternoon with update. Pt's wife verbalizes understanding of MD's orders.  Will forward to MD.

## 2021-05-04 NOTE — TELEPHONE ENCOUNTER
Pt wife called. A medication is causing the pt to get sick and is having constipation. Would like a call back on what to do about constipation.

## 2021-05-19 ENCOUNTER — TELEPHONE (OUTPATIENT)
Dept: CARDIOLOGY CLINIC | Age: 76
End: 2021-05-19

## 2021-05-19 DIAGNOSIS — R06.02 SHORTNESS OF BREATH: ICD-10-CM

## 2021-05-19 DIAGNOSIS — I50.22 CHRONIC SYSTOLIC HEART FAILURE (HCC): Primary | ICD-10-CM

## 2021-05-19 NOTE — PROGRESS NOTES
[facility] in [address], [facility] Note    Patient name: Mario Torres  Patient : 1945  Patient MRN: [id number]  Date of service: 21    Primary care physician: Jenn Herrera MD  Primary general cardiologist:  Dr. Rebecca Buchanan    Primary F cardiologist: Ariadna Hernandez MD    CHIEF COMPLAINT:  Chronic systolic heart failure    PLAN OF CARE:  · Severe ischemic cardiomyopathy, LVEF 20-25%; stage D, NYHA class IV symptoms  · Admission for initiation of palliative inotropic support and in-patient LVAD workup  · Tentative plan will be to discharge home on inotropes to optimize RV function/nutrition/conditioning pre-op if patient decides on LVAD surgery    PLAN:  Continue current medical therapy for heart failure  Obtain non-invasive hemodynamics with NICOM and 6MW prior to initiation of inotropes  Start milrinone 0.125mcg/kg/min and uptitrate to 0.375mcg/kg/min over next 3 days  Discontinue coreg due to RV dysfunction on echo and in ancitipation of surgery  Discontinue ACEi in anticipation of cardiac surgery  Continue current dose of eplerenone 25mg daily  Patient is not taking SGLT2 inhibitor; check HgbA1C  Change bumex to 1mg IV daily; elevated JVD, waiting for labs  Not on allopurinol or uloric, check uric acid level  Continue baby ASA and effient (note: may need effient washout prior to surgery)  Continue current dose of statin, check lipid profile, CPK and LFT  Continue ranolazine and imdur 30mg daily  Check ICD interrogation; retrieve records from EP cardiology at [address] [place] [address] Drive CPAP therapy; home CPAP in hospital  Routine HF labs: CBC, BMP, Mg, LFT, uric acid, pro-NT-BNP, lactic, iron profile with ferritin, TSH, vitamin D level, lipid profile, CPK, gammopathy profile, hepatitis panel, comprehensive REBECCA, HGBA1C, PSA   Provided educational materials; HF education  [facility] decision tool re: LVAD therapy and hospice   Provided advanced care plan forms to be filled out    Palliative care consultation  Nutritionist consult  GI consult if patient agrees to VAD workup    IMPRESSION:  Fatigue at rest  Shortness of breath with minimal exertion  Volume overload  Acute on chronic systolic heart failure   Stage D, NYHA class IV symptoms   Non-ischemic cardiomyopathy, LVEF 20% with LVEDD 6.2   RV dysfunciton, TAPSE 1.22 (prelimnary read)   TBili 1.5 likely from cardiac congestion  At risk of sudden cardiac death  · Recent cardiac arrest   · S/p ICD (1/2013, [employer] followed by [facility])  Coronary artery disease  · S/p multiple interventions  · S/p 4V CABG (8/2012)  · LHC (7/2019) severe stenosis of LIMA to LAD anastomosis site, SVG graft to OM is occluded, SVG graft to RCA 40-50%, LAD occluded proximally, severe proximal LCx, RCA is the long . · PET (6/2019) EF 24% with anterior lateral and inferior reversible defect  Cardiac risk factors   HTN   HL   DM2   SRUTHI on CPAP   MIld carotid stenosis  Anemia, microcytic  · Iron deficiency  DVT with filter    CARDIAC IMAGING:  Echo (5/20/21)  · LV: Estimated LVEF is 20 - 25%. Normal wall thickness. Mildly dilated left ventricle. Severely and globally reduced systolic function. Severe (grade 4) left ventricular diastolic dysfunction. · LA: Severely dilated left atrium. · RA: Severely dilated right atrium. · MV: Moderate mitral valve regurgitation is present. · TV: Moderate tricuspid valve regurgitation is present. · AO: Mild aortic root dilatation. · RV: Pacer/ICD present. · LVED 6.2cm  EKG pending  LHC as above  NST as above  ICD interrogation pending    HEMODYNAMICS:  RHC not done  CPEST not done  6MW not done    OTHER IMAGING:  CXR (6/17/21) clear  CT chest not done    HISTORY OF PRESENT ILLNESS:  I had the pleasure of seeing Therese Velazquez in [facility] at [address] [place] [address] in [place].     Briefly, Therese Velazquez is a 77 y.o. male with h/o HTN, HL, DM2, SRUTHI on CPAP, chronic systolic heart failure, stage D, NYHA class IV symptoms, non-ischemic cardiomyopathy, LVEF 20% with LVEDD 6.2, RV dysfunciton, TAPSE 1.22, TBili 1.5 likely from cardiac congestion, recent cardiac arrest s/p ICD (1/2013, [employer] followed by [facility]), coronary artery disease s/p multiple interventions s/p 4V CABG (8/2012), LHC (7/2019) severe stenosis of LIMA to LAD anastomosis site, SVG graft to OM is occluded, SVG graft to RCA 40-50%, LAD occluded proximally, severe proximal LCx, RCA is the long . PET (6/2019) EF 24% with anterior lateral and inferior reversible defect. Anemia, microcytic with iron deficiency, DVT with filter. [facility]. Jack Crisostomo for evaluation of options re: management of advanced heart failure symptoms. INTERVAL HISTORY:  Today, patient presents for initial clinic visit accompanied by his wife. Patient is doing poorly. Patient has great difficulty walking as short distance as from the waiting room to clinic exam room. Patient walked to our clinic from parking garage and was very tired. Patient can walk not more than 0.5 block without symptoms of fatigue or shortness of breath. Patient can walk only few steps of stairs without symptoms of fatigue or shortness of breath. Patient can perform home activities without problem and routinely participates in daily walking for more than 15 minutes. Patient denies symptoms of volume overload or leg edema. Patient denies abdominal bloating or change of appetite. Patient lost 35 lbs over last year due to loss of appetite. He has constant nausea recently with vomiting. Has cardiac asthma symptoms in supine position. Patient has 2 pillow orthopnea orthopnea, denies PND or nocturia. Patient denies irregular heart rate or palpitations. No presyncope or syncope. ICD has not fired.     Patient denies other cardiac symptoms such as chest pain or leg pain with walking. Patient is compliant with fluid restriction and taking medications as prescribed. Patient manages his own medications. REVIEW OF SYSTEMS:  General: Denies fever, night sweats. Ear, nose and throat: Denies difficulty hearing, sinus problems, runny nose, post-nasal drip, ringing in ears, mouth sores, loose teeth, ear pain, nosebleeds, sore throate, facial pain or numbess  Cardiovascular: see above in the interval history  Respiratory: Denies cough, wheezing, sputum production, hemoptysis. Gastrointestinal: Denies heartburn, constipation, diarrhea, abdominal pain, nausea, vomiting, difficulty swallowing, blood in stool  Kidney and bladder: Denies painful urination, frequent urination, urgency  Musculoskeletal: Denies joint pain, muscle weakness  Skin and hair: Denies change in existing skin lesions, hair loss or increase, breast changes    PHYSICAL EXAM:  Visit Vitals  /68 (BP 1 Location: Left arm, BP Patient Position: Sitting, BP Cuff Size: Adult)   Pulse 77   Temp 96.9 °F (36.1 °C) (Oral)   Resp 20   Ht 6' 1\" (1.854 m)   Wt 215 lb 6.4 oz (97.7 kg)   SpO2 99%   BMI 28.42 kg/m²     General: Patient is well developed, well-nourished in no acute distress  HEENT: Normocephalic and atraumatic. No scleral icterus. Pupils are equal, round and reactive to light and accomodation. No conjunctival injection. Oropharynx is clear. Neck: Supple. No evidence of thyroid enlargements or lymphadenopathy. JVD: 22cm  Lungs: Breath sounds are equal and clear bilaterally. No wheezes, rhonchi, or rales. Heart: Regular rate and rhythm with normal S1 and S2. No murmurs, gallops or rubs. Abdomen: Soft, no mass or tenderness. No organomegaly or hernia. Bowel sounds present. Genitourinary and rectal: deferred  Extremities: No cyanosis, clubbing, or edema. Neurologic: No focal sensory or motor deficits are noted. Grossly intact. Psychiatric: Awake, alert an doriented x 3.  Appropriate mood and affect. Skin: Warm, dry and well perfused. No lesions, nodules or rashes are noted. PAST MEDICAL HISTORY:  Past Medical History:   Diagnosis Date    CAD (coronary artery disease) '90 '97, '13 x 2    MI, code ice in 2013 at [facility]    Calculus of kidney     Colonic polyps     Congestive heart failure, unspecified     Diabetes ([facility] [address]     Gastritis     Hypercholesterolemia     Sleep apnea        PAST SURGICAL HISTORY:  Past Surgical History:   Procedure Laterality Date    COLONOSCOPY  04/06/2011    16, due 21    ENDOSCOPY, COLON, DIAGNOSTIC      11, due 16    HX CORONARY ARTERY BYPASS GRAFT  8/22/12    x 4 vessel by MISSY Ramirez    HX HEENT      LASIK    HX PACEMAKER PLACEMENT  1/30/13    OR CARDIAC SURG PROCEDURE UNLIST  2012    x 4 vessels       FAMILY HISTORY:  Family History   Problem Relation Age of Onset    Heart Disease Mother         MI    Heart Disease Father         CAD & PVD    Lung Disease Father     Cancer Father         lung    Diabetes Maternal Grandmother     Heart Disease Other         CAD    Stroke Sister        SOCIAL HISTORY:  Social History     Socioeconomic History    Marital status:      Spouse name: Not on file    Number of children: Not on file    Years of education: Not on file    Highest education level: Not on file   Tobacco Use    Smoking status: Never Smoker    Smokeless tobacco: Never Used   Substance and Sexual Activity    Alcohol use: Yes     Alcohol/week: 0.0 standard drinks     Comment:  VERY RARE    Drug use: No     Social Determinants of Health     Financial Resource Strain:     Difficulty of Paying Living Expenses:    Food Insecurity:     Worried About Running Out of Food in the Last Year:     [address] in the Last Year:    Transportation Needs:     Lack of Transportation (Medical):      Lack of Transportation (Non-Medical):    Physical Activity:     Days of Exercise per Week:     Minutes of Exercise per Session:    Stress:     Feeling of Stress :    Social Connections:     Frequency of Communication with Friends and Family:     Frequency of Social Gatherings with Friends and Family:     Attends Mosque Services:     Active Member of Clubs or Organizations:     Attends Club or Organization Meetings:     Marital Status:        LABORATORY RESULTS:  Labs Latest Ref Rng & Units 4/30/2021   WBC 4.1 - 11.1 K/uL 4.3   RBC 4.10 - 5.70 M/uL 5.01   Hemoglobin 12.1 - 17.0 g/dL 11. 9(L)   Hematocrit 36.6 - 50.3 % 38.2   MCV 80.0 - 99.0 FL 76. 2(L)   Platelets 766 - 923 K/uL 112(L)   Lymphocytes 12 - 49 % 19   Monocytes 5 - 13 % 10   Eosinophils 0 - 7 % 1   Basophils 0 - 1 % 1   Albumin 3.5 - 5.0 g/dL 3.9   Calcium 8.5 - 10.1 MG/DL 9.7   Glucose 65 - 100 mg/dL 132(H)   BUN 6 - 20 MG/DL 18   Creatinine 0.70 - 1.30 MG/DL 1.17   Sodium 136 - 145 mmol/L 138   Potassium 3.5 - 5.1 mmol/L 4.6   Some recent data might be hidden       ALLERGY:  Allergies   Allergen Reactions    Pcn [Penicillins] Hives        CURRENT MEDICATIONS:    Current Outpatient Medications:     ondansetron hcl (ZOFRAN) 4 mg tablet, Take 1 Tab by mouth every eight (8) hours as needed for Nausea, Vomiting or Nausea or Vomiting., Disp: 20 Tab, Rfl: 1    rosuvastatin (CRESTOR) 10 mg tablet, TAKE 1 TABLET NIGHTLY, Disp: 90 Tab, Rfl: 3    ferrous sulfate (Iron) 325 mg (65 mg iron) tablet, Take  by mouth Daily (before breakfast). , Disp: , Rfl:     omeprazole (PRILOSEC) 20 mg capsule, Take 1 Cap by mouth daily. Indications: indigestion, Disp: 30 Cap, Rfl: 1    tamsulosin (FLOMAX) 0.4 mg capsule, TAKE 1 CAPSULE DAILY, Disp: 90 Cap, Rfl: 3    fosinopriL (MONOPRIL) 10 mg tablet, TAKE 1 TABLET DAILY, Disp: 90 Tab, Rfl: 3    metFORMIN (GLUCOPHAGE) 500 mg tablet, TAKE 2 TABLETS TWICE A DAY WITH MEALS, Disp: 360 Tab, Rfl: 3    eplerenone (INSPRA) 25 mg tablet, Take 25 mg by mouth daily. , Disp: , Rfl:     carvediloL (COREG) 6.25 mg tablet, TAKE 1 TABLET TWICE A DAY WITH MEALS, Disp: 60 Tab, Rfl: 0    glipiZIDE SR (GLUCOTROL XL) 10 mg CR tablet, TAKE 1 TABLET DAILY (DUE FOR OFFICE VISIT PLEASE CALL OFFICE TO SCHEDULE), Disp: 30 Tab, Rfl: 0    glucose blood VI test strips (OneTouch Ultra Blue Test Strip) strip, Use to test blood sugar daily, Disp: 100 Strip, Rfl: 11    lancets misc, Use to test blood sugar daily, Disp: 100 Each, Rfl: 11    ranolazine ER (RANEXA) 500 mg SR tablet, Take 1 Tab by mouth two (2) times a day. Per cardiology, Disp: 1 Tab, Rfl: 0    prasugrel (EFFIENT) 10 mg tablet, TAKE 1 TABLET DAILY, Disp: 90 Tab, Rfl: 4    bumetanide (BUMEX) 2 mg tablet, Take  by mouth. 1/2 tab every other day., Disp: , Rfl: 0    isosorbide mononitrate ER (IMDUR) 30 mg tablet, Take 30 mg by mouth daily. , Disp: , Rfl: 3    nitroglycerin (NITROSTAT) 0.4 mg SL tablet, 0.4 mg by SubLINGual route every five (5) minutes as needed. , Disp: , Rfl: 0    acetaminophen (TYLENOL EXTRA STRENGTH) 500 mg tablet, Take  by mouth every six (6) hours as needed. , Disp: , Rfl:     aspirin delayed-release 81 mg tablet, Take 81 mg by mouth daily. , Disp: , Rfl:     Thank you for your referral and allowing me to participate in this patient's care.     Norbert Cooks, MD PhD  [address] [place] [address], [address]  Phone: [phone]  Fax: [phone]    PATIENT CARE TEAM:  Patient Care Team:  Jaja Rocha MD as PCP - Janean Schwab, MD as PCP - [facility]  Antoine Patel MD as Physician (Cardiology)  Azra Sommer MD as Physician (Cardiology)  Lana Bob MD as Physician (Gastroenterology)  Laure Sanchez MD as Physician (Orthopedic Surgery)  Celio Conteh MD as Physician (Ophthalmology)  Willaim Kinnier Wilms, MD as Physician ([address])  Esperanza Bello MD (Cardiology)     Total visit time: 80 minutes (> 50% spent face-to-face counseling)

## 2021-05-20 ENCOUNTER — APPOINTMENT (OUTPATIENT)
Dept: GENERAL RADIOLOGY | Age: 76
DRG: 287 | End: 2021-05-20
Attending: NURSE PRACTITIONER
Payer: MEDICARE

## 2021-05-20 ENCOUNTER — OFFICE VISIT (OUTPATIENT)
Dept: CARDIOLOGY CLINIC | Age: 76
End: 2021-05-20

## 2021-05-20 ENCOUNTER — HOSPITAL ENCOUNTER (INPATIENT)
Age: 76
LOS: 5 days | Discharge: HOME HEALTH | DRG: 287 | End: 2021-05-25
Attending: INTERNAL MEDICINE | Admitting: INTERNAL MEDICINE
Payer: MEDICARE

## 2021-05-20 ENCOUNTER — HOSPITAL ENCOUNTER (OUTPATIENT)
Dept: NON INVASIVE DIAGNOSTICS | Age: 76
Discharge: HOME | DRG: 287 | End: 2021-05-20
Attending: INTERNAL MEDICINE
Payer: MEDICARE

## 2021-05-20 VITALS
WEIGHT: 215.4 LBS | TEMPERATURE: 96.9 F | RESPIRATION RATE: 20 BRPM | BODY MASS INDEX: 28.55 KG/M2 | HEART RATE: 77 BPM | SYSTOLIC BLOOD PRESSURE: 122 MMHG | OXYGEN SATURATION: 99 % | HEIGHT: 73 IN | DIASTOLIC BLOOD PRESSURE: 68 MMHG

## 2021-05-20 DIAGNOSIS — R06.02 SHORTNESS OF BREATH: ICD-10-CM

## 2021-05-20 DIAGNOSIS — E87.79 CARDIAC VOLUME OVERLOAD: ICD-10-CM

## 2021-05-20 DIAGNOSIS — I50.22 CHRONIC SYSTOLIC HEART FAILURE (HCC): ICD-10-CM

## 2021-05-20 DIAGNOSIS — Z79.899 RECEIVING INOTROPIC MEDICATION: ICD-10-CM

## 2021-05-20 DIAGNOSIS — Z01.818 PREOPERATIVE EVALUATION OF A MEDICAL CONDITION TO RULE OUT SURGICAL CONTRAINDICATIONS (TAR REQUIRED): ICD-10-CM

## 2021-05-20 DIAGNOSIS — I50.23 ACUTE ON CHRONIC CLINICAL SYSTOLIC HEART FAILURE (HCC): ICD-10-CM

## 2021-05-20 DIAGNOSIS — I50.9 CONGESTIVE HEART FAILURE, UNSPECIFIED HF CHRONICITY, UNSPECIFIED HEART FAILURE TYPE (HCC): ICD-10-CM

## 2021-05-20 DIAGNOSIS — I50.23 SYSTOLIC CHF, ACUTE ON CHRONIC (HCC): Primary | ICD-10-CM

## 2021-05-20 LAB
25(OH)D3 SERPL-MCNC: 29.1 NG/ML (ref 30–100)
ALBUMIN SERPL-MCNC: 3.8 G/DL (ref 3.5–5)
ALBUMIN/GLOB SERPL: 1.1 {RATIO} (ref 1.1–2.2)
ALP SERPL-CCNC: 202 U/L (ref 45–117)
ALT SERPL-CCNC: 14 U/L (ref 12–78)
ANION GAP SERPL CALC-SCNC: 8 MMOL/L (ref 5–15)
AST SERPL-CCNC: 61 U/L (ref 15–37)
BILIRUB SERPL-MCNC: 1.4 MG/DL (ref 0.2–1)
BNP SERPL-MCNC: 5331 PG/ML
BUN SERPL-MCNC: 18 MG/DL (ref 6–20)
BUN/CREAT SERPL: 15 (ref 12–20)
CALCIUM SERPL-MCNC: 9.1 MG/DL (ref 8.5–10.1)
CHLORIDE SERPL-SCNC: 103 MMOL/L (ref 97–108)
CHOLEST SERPL-MCNC: 112 MG/DL
CK SERPL-CCNC: 63 U/L (ref 39–308)
CO2 SERPL-SCNC: 26 MMOL/L (ref 21–32)
CREAT SERPL-MCNC: 1.18 MG/DL (ref 0.7–1.3)
ECHO AO ROOT DIAM: 4.02 CM
ECHO AR MAX VEL PISA: 437.51 CM/S
ECHO AV PEAK GRADIENT: 4.57 MMHG
ECHO AV PEAK VELOCITY: 106.94 CM/S
ECHO AV REGURGITANT PHT: 676.42 MS
ECHO IVC PROX: 2.71 CM
ECHO LA AREA 4C: 32.33 CM2
ECHO LA MAJOR AXIS: 4.48 CM
ECHO LA VOL 2C: 138.93 ML (ref 18–58)
ECHO LA VOL 4C: 113.17 ML (ref 18–58)
ECHO LA VOL BP: 134.61 ML (ref 18–58)
ECHO LV EDV A2C: 226.43 ML
ECHO LV EDV A4C: 205.78 ML
ECHO LV EDV BP: 217.45 ML (ref 67–155)
ECHO LV EJECTION FRACTION A2C: 30 PERCENT
ECHO LV EJECTION FRACTION A4C: 51 PERCENT
ECHO LV EJECTION FRACTION BIPLANE: 40.9 PERCENT (ref 55–100)
ECHO LV ESV A2C: 158.03 ML
ECHO LV ESV A4C: 100 ML
ECHO LV ESV BP: 128.42 ML (ref 22–58)
ECHO LV INTERNAL DIMENSION DIASTOLIC: 6.2 CM (ref 4.2–5.9)
ECHO LV INTERNAL DIMENSION SYSTOLIC: 5.3 CM
ECHO LV IVSD: 0.85 CM (ref 0.6–1)
ECHO LV MASS 2D: 198.7 G (ref 88–224)
ECHO LV POSTERIOR WALL DIASTOLIC: 0.76 CM (ref 0.6–1)
ECHO LVOT DIAM: 2.32 CM
ECHO MV E VELOCITY: 67.55 CM/S
ECHO MV EROA PISA: 0.27 CM2
ECHO MV REGURGITANT RADIUS PISA: 0.76 CM
ECHO MV REGURGITANT VOLUME: 43.01 ML
ECHO MV REGURGITANT VTIA: 161.43 CM
ECHO PV PEAK INSTANTANEOUS GRADIENT SYSTOLIC: 0.61 MMHG
ECHO RV INTERNAL DIMENSION: 4.89 CM
ECHO RV TAPSE: 1.22 CM (ref 1.5–2)
ECHO TV REGURGITANT MAX VELOCITY: 227.02 CM/S
ECHO TV REGURGITANT PEAK GRADIENT: 20.62 MMHG
ERYTHROCYTE [DISTWIDTH] IN BLOOD BY AUTOMATED COUNT: 18.7 % (ref 11.5–14.5)
EST. AVERAGE GLUCOSE BLD GHB EST-MCNC: 154 MG/DL
FERRITIN SERPL-MCNC: 27 NG/ML (ref 26–388)
GLOBULIN SER CALC-MCNC: 3.5 G/DL (ref 2–4)
GLUCOSE BLD STRIP.AUTO-MCNC: 146 MG/DL (ref 65–117)
GLUCOSE SERPL-MCNC: 125 MG/DL (ref 65–100)
HAV IGM SER QL: NONREACTIVE
HBA1C MFR BLD: 7 % (ref 4–5.6)
HBV CORE IGM SER QL: NONREACTIVE
HBV SURFACE AG SER QL: <0.1 INDEX
HBV SURFACE AG SER QL: NEGATIVE
HCT VFR BLD AUTO: 37.1 % (ref 36.6–50.3)
HCV AB SERPL QL IA: NONREACTIVE
HCV COMMENT,HCGAC: NORMAL
HDLC SERPL-MCNC: 44 MG/DL
HDLC SERPL: 2.5 {RATIO} (ref 0–5)
HGB BLD-MCNC: 11.3 G/DL (ref 12.1–17)
IRON SATN MFR SERPL: 8 % (ref 20–50)
IRON SERPL-MCNC: 32 UG/DL (ref 35–150)
LA VOL DISK BP: 125.73 ML (ref 18–58)
LDLC SERPL CALC-MCNC: 50.8 MG/DL (ref 0–100)
MAGNESIUM SERPL-MCNC: 2.1 MG/DL (ref 1.6–2.4)
MCH RBC QN AUTO: 23.1 PG (ref 26–34)
MCHC RBC AUTO-ENTMCNC: 30.5 G/DL (ref 30–36.5)
MCV RBC AUTO: 75.9 FL (ref 80–99)
MR PISA PV: 505.09 CM/S
NRBC # BLD: 0 K/UL (ref 0–0.01)
NRBC BLD-RTO: 0 PER 100 WBC
PLATELET # BLD AUTO: 110 K/UL (ref 150–400)
POTASSIUM SERPL-SCNC: 4.1 MMOL/L (ref 3.5–5.1)
PROT SERPL-MCNC: 7.3 G/DL (ref 6.4–8.2)
PSA SERPL-MCNC: 2.2 NG/ML (ref 0.01–4)
RBC # BLD AUTO: 4.89 M/UL (ref 4.1–5.7)
SERVICE CMNT-IMP: ABNORMAL
SODIUM SERPL-SCNC: 137 MMOL/L (ref 136–145)
SP1: NORMAL
SP2: NORMAL
SP3: NORMAL
T4 FREE SERPL-MCNC: 1.3 NG/DL (ref 0.8–1.5)
TIBC SERPL-MCNC: 420 UG/DL (ref 250–450)
TRIGL SERPL-MCNC: 86 MG/DL (ref ?–150)
TSH SERPL DL<=0.05 MIU/L-ACNC: 1.97 UIU/ML (ref 0.36–3.74)
URATE SERPL-MCNC: 5.5 MG/DL (ref 3.5–7.2)
VLDLC SERPL CALC-MCNC: 17.2 MG/DL
WBC # BLD AUTO: 3.7 K/UL (ref 4.1–11.1)

## 2021-05-20 PROCEDURE — 80061 LIPID PANEL: CPT

## 2021-05-20 PROCEDURE — 83880 ASSAY OF NATRIURETIC PEPTIDE: CPT

## 2021-05-20 PROCEDURE — 84153 ASSAY OF PSA TOTAL: CPT

## 2021-05-20 PROCEDURE — 93005 ELECTROCARDIOGRAM TRACING: CPT

## 2021-05-20 PROCEDURE — 82962 GLUCOSE BLOOD TEST: CPT

## 2021-05-20 PROCEDURE — 74011250636 HC RX REV CODE- 250/636: Performed by: NURSE PRACTITIONER

## 2021-05-20 PROCEDURE — 85027 COMPLETE CBC AUTOMATED: CPT

## 2021-05-20 PROCEDURE — 84550 ASSAY OF BLOOD/URIC ACID: CPT

## 2021-05-20 PROCEDURE — 83735 ASSAY OF MAGNESIUM: CPT

## 2021-05-20 PROCEDURE — 82306 VITAMIN D 25 HYDROXY: CPT

## 2021-05-20 PROCEDURE — 84439 ASSAY OF FREE THYROXINE: CPT

## 2021-05-20 PROCEDURE — 80074 ACUTE HEPATITIS PANEL: CPT

## 2021-05-20 PROCEDURE — 99205 OFFICE O/P NEW HI 60 MIN: CPT | Performed by: INTERNAL MEDICINE

## 2021-05-20 PROCEDURE — 74011250637 HC RX REV CODE- 250/637: Performed by: NURSE PRACTITIONER

## 2021-05-20 PROCEDURE — 83036 HEMOGLOBIN GLYCOSYLATED A1C: CPT

## 2021-05-20 PROCEDURE — 71046 X-RAY EXAM CHEST 2 VIEWS: CPT

## 2021-05-20 PROCEDURE — 82784 ASSAY IGA/IGD/IGG/IGM EACH: CPT

## 2021-05-20 PROCEDURE — 36415 COLL VENOUS BLD VENIPUNCTURE: CPT

## 2021-05-20 PROCEDURE — 82550 ASSAY OF CK (CPK): CPT

## 2021-05-20 PROCEDURE — 93306 TTE W/DOPPLER COMPLETE: CPT

## 2021-05-20 PROCEDURE — 84443 ASSAY THYROID STIM HORMONE: CPT

## 2021-05-20 PROCEDURE — 80053 COMPREHEN METABOLIC PANEL: CPT

## 2021-05-20 PROCEDURE — 65660000001 HC RM ICU INTERMED STEPDOWN

## 2021-05-20 PROCEDURE — 82728 ASSAY OF FERRITIN: CPT

## 2021-05-20 PROCEDURE — 86038 ANTINUCLEAR ANTIBODIES: CPT

## 2021-05-20 PROCEDURE — 83540 ASSAY OF IRON: CPT

## 2021-05-20 RX ORDER — BUMETANIDE 0.25 MG/ML
2 INJECTION INTRAMUSCULAR; INTRAVENOUS 2 TIMES DAILY
Status: DISCONTINUED | OUTPATIENT
Start: 2021-05-21 | End: 2021-05-25

## 2021-05-20 RX ORDER — ACETAMINOPHEN 325 MG/1
650 TABLET ORAL
Status: DISCONTINUED | OUTPATIENT
Start: 2021-05-20 | End: 2021-05-25 | Stop reason: HOSPADM

## 2021-05-20 RX ORDER — EPLERENONE 25 MG/1
25 TABLET, FILM COATED ORAL DAILY
Status: DISCONTINUED | OUTPATIENT
Start: 2021-05-21 | End: 2021-05-23

## 2021-05-20 RX ORDER — PRASUGREL 10 MG/1
10 TABLET, FILM COATED ORAL DAILY
Status: DISCONTINUED | OUTPATIENT
Start: 2021-05-21 | End: 2021-05-25 | Stop reason: HOSPADM

## 2021-05-20 RX ORDER — TAMSULOSIN HYDROCHLORIDE 0.4 MG/1
0.4 CAPSULE ORAL DAILY
Status: DISCONTINUED | OUTPATIENT
Start: 2021-05-21 | End: 2021-05-25 | Stop reason: HOSPADM

## 2021-05-20 RX ORDER — CARVEDILOL 6.25 MG/1
6.25 TABLET ORAL 2 TIMES DAILY WITH MEALS
Status: DISCONTINUED | OUTPATIENT
Start: 2021-05-20 | End: 2021-05-20

## 2021-05-20 RX ORDER — ISOSORBIDE MONONITRATE 30 MG/1
30 TABLET, EXTENDED RELEASE ORAL DAILY
Status: DISCONTINUED | OUTPATIENT
Start: 2021-05-21 | End: 2021-05-23

## 2021-05-20 RX ORDER — LISINOPRIL 10 MG/1
10 TABLET ORAL DAILY
Status: DISCONTINUED | OUTPATIENT
Start: 2021-05-21 | End: 2021-05-20

## 2021-05-20 RX ORDER — SODIUM CHLORIDE 0.9 % (FLUSH) 0.9 %
5-40 SYRINGE (ML) INJECTION AS NEEDED
Status: DISCONTINUED | OUTPATIENT
Start: 2021-05-20 | End: 2021-05-25 | Stop reason: HOSPADM

## 2021-05-20 RX ORDER — BUMETANIDE 0.25 MG/ML
1 INJECTION INTRAMUSCULAR; INTRAVENOUS DAILY
Status: DISCONTINUED | OUTPATIENT
Start: 2021-05-21 | End: 2021-05-20

## 2021-05-20 RX ORDER — MILRINONE LACTATE 0.2 MG/ML
0.12 INJECTION, SOLUTION INTRAVENOUS CONTINUOUS
Status: DISCONTINUED | OUTPATIENT
Start: 2021-05-20 | End: 2021-05-22

## 2021-05-20 RX ORDER — BUMETANIDE 1 MG/1
0.5 TABLET ORAL EVERY OTHER DAY
Status: DISCONTINUED | OUTPATIENT
Start: 2021-05-20 | End: 2021-05-20

## 2021-05-20 RX ORDER — ASPIRIN 81 MG/1
81 TABLET ORAL DAILY
Status: DISCONTINUED | OUTPATIENT
Start: 2021-05-21 | End: 2021-05-25 | Stop reason: HOSPADM

## 2021-05-20 RX ORDER — POLYETHYLENE GLYCOL 3350 17 G/17G
17 POWDER, FOR SOLUTION ORAL DAILY PRN
Status: DISCONTINUED | OUTPATIENT
Start: 2021-05-20 | End: 2021-05-25 | Stop reason: HOSPADM

## 2021-05-20 RX ORDER — ERGOCALCIFEROL 1.25 MG/1
50000 CAPSULE ORAL
Status: DISCONTINUED | OUTPATIENT
Start: 2021-05-21 | End: 2021-05-25 | Stop reason: HOSPADM

## 2021-05-20 RX ORDER — RANOLAZINE 500 MG/1
500 TABLET, EXTENDED RELEASE ORAL 2 TIMES DAILY
Refills: 0 | Status: DISCONTINUED | OUTPATIENT
Start: 2021-05-20 | End: 2021-05-22

## 2021-05-20 RX ORDER — ACETAMINOPHEN 650 MG/1
650 SUPPOSITORY RECTAL
Status: DISCONTINUED | OUTPATIENT
Start: 2021-05-20 | End: 2021-05-25 | Stop reason: HOSPADM

## 2021-05-20 RX ORDER — ONDANSETRON 2 MG/ML
4 INJECTION INTRAMUSCULAR; INTRAVENOUS
Status: DISCONTINUED | OUTPATIENT
Start: 2021-05-20 | End: 2021-05-25 | Stop reason: HOSPADM

## 2021-05-20 RX ORDER — PROMETHAZINE HYDROCHLORIDE 25 MG/1
12.5 TABLET ORAL
Status: DISCONTINUED | OUTPATIENT
Start: 2021-05-20 | End: 2021-05-25 | Stop reason: HOSPADM

## 2021-05-20 RX ORDER — FAMOTIDINE 20 MG/1
20 TABLET, FILM COATED ORAL 2 TIMES DAILY
Status: DISCONTINUED | OUTPATIENT
Start: 2021-05-20 | End: 2021-05-25 | Stop reason: HOSPADM

## 2021-05-20 RX ORDER — SODIUM CHLORIDE 0.9 % (FLUSH) 0.9 %
5-40 SYRINGE (ML) INJECTION EVERY 8 HOURS
Status: DISCONTINUED | OUTPATIENT
Start: 2021-05-20 | End: 2021-05-25 | Stop reason: HOSPADM

## 2021-05-20 RX ORDER — ROSUVASTATIN CALCIUM 10 MG/1
10 TABLET, COATED ORAL
Status: DISCONTINUED | OUTPATIENT
Start: 2021-05-20 | End: 2021-05-25 | Stop reason: HOSPADM

## 2021-05-20 RX ADMIN — MILRINONE LACTATE IN DEXTROSE 0.12 MCG/KG/MIN: 200 INJECTION, SOLUTION INTRAVENOUS at 18:51

## 2021-05-20 RX ADMIN — RANOLAZINE 500 MG: 500 TABLET, FILM COATED, EXTENDED RELEASE ORAL at 22:07

## 2021-05-20 RX ADMIN — Medication 10 ML: at 18:15

## 2021-05-20 RX ADMIN — Medication 10 ML: at 22:07

## 2021-05-20 RX ADMIN — ROSUVASTATIN 10 MG: 10 TABLET, FILM COATED ORAL at 22:07

## 2021-05-20 RX ADMIN — FAMOTIDINE 20 MG: 20 TABLET ORAL at 18:15

## 2021-05-20 NOTE — PROGRESS NOTES
Met with patient and family member in clinic. Booklet, [facility]ardo Living A More Active Life with the Heartmate 3 LVAD for the treatment of heart failure, given to patient to review. [employer] Heartmate 3 LVAD Patient Education Video given to patient to watch. Will check back in with patient for any questions.

## 2021-05-20 NOTE — PROGRESS NOTES
Patient showed no negative reaction to his six minute walk. His O2 saturations never fell below 99%. His heart rate ray to 81 beats per minute during the walk but that is expected.  At the end of his last lap he showed a little fatigue but was able to finish the test.

## 2021-05-20 NOTE — PROGRESS NOTES
NICOM Hemodynamic Monitoring       Visit Vitals  BP (!) 123/92 (BP 1 Location: Left arm, BP Patient Position: Sitting)   Pulse 63   Temp 97.5 °F (36.4 °C)   Resp 16   Wt 97.6 kg (215 lb 2.7 oz)   SpO2 99%   BMI 28.39 kg/m²         Baseline assessment:        CO 8.2        CI 3.7        SVI 61        .2

## 2021-05-20 NOTE — PROGRESS NOTES
1622: TRANSFER - IN REPORT:    Verbal report received from Angeli NP(name) [facility]  being [facility] from (Direct admit from advance [facility] Clinic) (unit) for routine progression of care      Report consisted of patients Situation, Background, Assessment and   Recommendations(SBAR). Information from the following report(s) Kardex was reviewed with the receiving nurse. Opportunity for questions and clarification was provided. Assessment completed upon patients arrival to unit and care assumed. 1718: EKG shows sinus armani with a first degree AV block. Angeli NP has been notified and made aware. No new orders were given. 1930: Bedside shift change report given to [address] (oncoming nurse) by Ashleigh RN (offgoing nurse). Report included the following information SBAR, Kardex, Intake/Output, MAR, Recent Results and Cardiac Rhythm NSR-SB with a first degree AV block .

## 2021-05-21 ENCOUNTER — DOCUMENTATION ONLY (OUTPATIENT)
Dept: CARDIOLOGY CLINIC | Age: 76
End: 2021-05-21

## 2021-05-21 ENCOUNTER — APPOINTMENT (OUTPATIENT)
Dept: CT IMAGING | Age: 76
DRG: 287 | End: 2021-05-21
Attending: INTERNAL MEDICINE
Payer: MEDICARE

## 2021-05-21 PROBLEM — E43 SEVERE PROTEIN-CALORIE MALNUTRITION (HCC): Status: ACTIVE | Noted: 2021-05-21

## 2021-05-21 LAB
ABO + RH BLD: NORMAL
ALBUMIN SERPL-MCNC: 3.2 G/DL (ref 3.5–5)
ALBUMIN/GLOB SERPL: 1 {RATIO} (ref 1.1–2.2)
ALP SERPL-CCNC: 158 U/L (ref 45–117)
ALT SERPL-CCNC: 11 U/L (ref 12–78)
AMMONIA PLAS-SCNC: 48 UMOL/L
AMPHET UR QL SCN: NEGATIVE
ANA TITR SER IF: NEGATIVE {TITER}
ANION GAP SERPL CALC-SCNC: 8 MMOL/L (ref 5–15)
APPEARANCE UR: CLEAR
APTT PPP: 26.8 SEC (ref 22.1–31)
ARTERIAL PATENCY WRIST A: POSITIVE
AST SERPL-CCNC: 47 U/L (ref 15–37)
ATRIAL RATE: 58 BPM
ATRIAL RATE: 59 BPM
BACTERIA URNS QL MICRO: NEGATIVE /HPF
BARBITURATES UR QL SCN: NEGATIVE
BASE EXCESS BLD CALC-SCNC: 4.9 MMOL/L
BDY SITE: ABNORMAL
BENZODIAZ UR QL: NEGATIVE
BILIRUB SERPL-MCNC: 1.1 MG/DL (ref 0.2–1)
BILIRUB UR QL: NEGATIVE
BLOOD GROUP ANTIBODIES SERPL: NORMAL
BNP SERPL-MCNC: 4392 PG/ML
BUN SERPL-MCNC: 16 MG/DL (ref 6–20)
BUN/CREAT SERPL: 15 (ref 12–20)
CA-I BLD-SCNC: 1.11 MMOL/L (ref 1.12–1.32)
CALCIUM SERPL-MCNC: 8.6 MG/DL (ref 8.5–10.1)
CALCIUM SERPL-MCNC: 9.1 MG/DL (ref 8.5–10.1)
CALCULATED P AXIS, ECG09: -4 DEGREES
CALCULATED R AXIS, ECG10: 70 DEGREES
CALCULATED R AXIS, ECG10: 79 DEGREES
CALCULATED T AXIS, ECG11: 127 DEGREES
CALCULATED T AXIS, ECG11: 130 DEGREES
CANNABINOIDS UR QL SCN: NEGATIVE
CHLORIDE SERPL-SCNC: 105 MMOL/L (ref 97–108)
CO2 SERPL-SCNC: 26 MMOL/L (ref 21–32)
COCAINE UR QL SCN: NEGATIVE
COLOR UR: NORMAL
CORTIS SERPL-MCNC: 16 UG/DL
CREAT SERPL-MCNC: 1.07 MG/DL (ref 0.7–1.3)
CRP SERPL HS-MCNC: 2.6 MG/L
DIAGNOSIS, 93000: NORMAL
DIAGNOSIS, 93000: NORMAL
DRUG SCRN COMMENT,DRGCM: NORMAL
EPITH CASTS URNS QL MICRO: NORMAL /LPF
ERYTHROCYTE [DISTWIDTH] IN BLOOD BY AUTOMATED COUNT: 18.5 % (ref 11.5–14.5)
ERYTHROCYTE [SEDIMENTATION RATE] IN BLOOD: 12 MM/HR (ref 0–20)
GAS FLOW.O2 O2 DELIVERY SYS: ABNORMAL L/MIN
GLOBULIN SER CALC-MCNC: 3.3 G/DL (ref 2–4)
GLUCOSE BLD STRIP.AUTO-MCNC: 210 MG/DL (ref 65–117)
GLUCOSE BLD STRIP.AUTO-MCNC: 73 MG/DL (ref 65–117)
GLUCOSE SERPL-MCNC: 100 MG/DL (ref 65–100)
GLUCOSE UR STRIP.AUTO-MCNC: NEGATIVE MG/DL
HCO3 BLD-SCNC: 27.4 MMOL/L (ref 22–26)
HCT VFR BLD AUTO: 32.1 % (ref 36.6–50.3)
HGB BLD-MCNC: 9.9 G/DL (ref 12.1–17)
HGB UR QL STRIP: NEGATIVE
HYALINE CASTS URNS QL MICRO: NORMAL /LPF (ref 0–5)
INR PPP: 1.3 (ref 0.9–1.1)
KETONES UR QL STRIP.AUTO: NEGATIVE MG/DL
LACTATE SERPL-SCNC: 1.3 MMOL/L (ref 0.4–2)
LDH SERPL L TO P-CCNC: 168 U/L (ref 85–241)
LEUKOCYTE ESTERASE UR QL STRIP.AUTO: NEGATIVE
MAGNESIUM SERPL-MCNC: 1.9 MG/DL (ref 1.6–2.4)
MCH RBC QN AUTO: 23.5 PG (ref 26–34)
MCHC RBC AUTO-ENTMCNC: 30.8 G/DL (ref 30–36.5)
MCV RBC AUTO: 76.1 FL (ref 80–99)
METHADONE UR QL: NEGATIVE
NITRITE UR QL STRIP.AUTO: NEGATIVE
NRBC # BLD: 0 K/UL (ref 0–0.01)
NRBC BLD-RTO: 0 PER 100 WBC
OPIATES UR QL: NEGATIVE
P-R INTERVAL, ECG05: 228 MS
P-R INTERVAL, ECG05: 240 MS
PCO2 BLD: 32.7 MMHG (ref 35–45)
PCP UR QL: NEGATIVE
PH BLD: 7.53 [PH] (ref 7.35–7.45)
PH UR STRIP: 7 [PH] (ref 5–8)
PLATELET # BLD AUTO: 94 K/UL (ref 150–400)
PO2 BLD: 108 MMHG (ref 80–100)
POTASSIUM SERPL-SCNC: 3.6 MMOL/L (ref 3.5–5.1)
PREALB SERPL-MCNC: 22.3 MG/DL (ref 20–40)
PROT SERPL-MCNC: 6.5 G/DL (ref 6.4–8.2)
PROT UR STRIP-MCNC: NEGATIVE MG/DL
PROTHROMBIN TIME: 13 SEC (ref 9–11.1)
PTH-INTACT SERPL-MCNC: 100.4 PG/ML (ref 18.4–88)
Q-T INTERVAL, ECG07: 452 MS
Q-T INTERVAL, ECG07: 456 MS
QRS DURATION, ECG06: 112 MS
QRS DURATION, ECG06: 116 MS
QTC CALCULATION (BEZET), ECG08: 447 MS
QTC CALCULATION (BEZET), ECG08: 447 MS
RBC # BLD AUTO: 4.22 M/UL (ref 4.1–5.7)
RBC #/AREA URNS HPF: NORMAL /HPF (ref 0–5)
SAO2 % BLD: 98.8 % (ref 92–97)
SARS-COV-2, COV2: NORMAL
SERVICE CMNT-IMP: ABNORMAL
SERVICE CMNT-IMP: NORMAL
SODIUM SERPL-SCNC: 139 MMOL/L (ref 136–145)
SP GR UR REFRACTOMETRY: 1.01 (ref 1–1.03)
SPECIMEN EXP DATE BLD: NORMAL
SPECIMEN TYPE: ABNORMAL
T3FREE SERPL-MCNC: 2.5 PG/ML (ref 2.2–4)
THERAPEUTIC RANGE,PTTT: NORMAL SECS (ref 58–77)
TROPONIN I SERPL-MCNC: <0.05 NG/ML
UA: UC IF INDICATED,UAUC: NORMAL
UROBILINOGEN UR QL STRIP.AUTO: 1 EU/DL (ref 0.2–1)
VENTRICULAR RATE, ECG03: 58 BPM
VENTRICULAR RATE, ECG03: 59 BPM
WBC # BLD AUTO: 2.7 K/UL (ref 4.1–11.1)
WBC URNS QL MICRO: NORMAL /HPF (ref 0–4)

## 2021-05-21 PROCEDURE — 36415 COLL VENOUS BLD VENIPUNCTURE: CPT

## 2021-05-21 PROCEDURE — 82803 BLOOD GASES ANY COMBINATION: CPT

## 2021-05-21 PROCEDURE — 93005 ELECTROCARDIOGRAM TRACING: CPT

## 2021-05-21 PROCEDURE — 85613 RUSSELL VIPER VENOM DILUTED: CPT

## 2021-05-21 PROCEDURE — 83051 HEMOGLOBIN PLASMA: CPT

## 2021-05-21 PROCEDURE — 84481 FREE ASSAY (FT-3): CPT

## 2021-05-21 PROCEDURE — 83605 ASSAY OF LACTIC ACID: CPT

## 2021-05-21 PROCEDURE — 74011250636 HC RX REV CODE- 250/636: Performed by: INTERNAL MEDICINE

## 2021-05-21 PROCEDURE — 86141 C-REACTIVE PROTEIN HS: CPT

## 2021-05-21 PROCEDURE — 80307 DRUG TEST PRSMV CHEM ANLYZR: CPT

## 2021-05-21 PROCEDURE — 86901 BLOOD TYPING SEROLOGIC RH(D): CPT

## 2021-05-21 PROCEDURE — 83970 ASSAY OF PARATHORMONE: CPT

## 2021-05-21 PROCEDURE — 87040 BLOOD CULTURE FOR BACTERIA: CPT

## 2021-05-21 PROCEDURE — 85610 PROTHROMBIN TIME: CPT

## 2021-05-21 PROCEDURE — 74011250636 HC RX REV CODE- 250/636: Performed by: NURSE PRACTITIONER

## 2021-05-21 PROCEDURE — 74176 CT ABD & PELVIS W/O CONTRAST: CPT

## 2021-05-21 PROCEDURE — 86677 HELICOBACTER PYLORI ANTIBODY: CPT

## 2021-05-21 PROCEDURE — 82955 ASSAY OF G6PD ENZYME: CPT

## 2021-05-21 PROCEDURE — 74011000258 HC RX REV CODE- 258: Performed by: INTERNAL MEDICINE

## 2021-05-21 PROCEDURE — 85027 COMPLETE CBC AUTOMATED: CPT

## 2021-05-21 PROCEDURE — 74011250637 HC RX REV CODE- 250/637: Performed by: INTERNAL MEDICINE

## 2021-05-21 PROCEDURE — 84484 ASSAY OF TROPONIN QUANT: CPT

## 2021-05-21 PROCEDURE — 99233 SBSQ HOSP IP/OBS HIGH 50: CPT | Performed by: INTERNAL MEDICINE

## 2021-05-21 PROCEDURE — 36600 WITHDRAWAL OF ARTERIAL BLOOD: CPT

## 2021-05-21 PROCEDURE — 71250 CT THORAX DX C-: CPT

## 2021-05-21 PROCEDURE — 83615 LACTATE (LD) (LDH) ENZYME: CPT

## 2021-05-21 PROCEDURE — 82140 ASSAY OF AMMONIA: CPT

## 2021-05-21 PROCEDURE — 80053 COMPREHEN METABOLIC PANEL: CPT

## 2021-05-21 PROCEDURE — 83735 ASSAY OF MAGNESIUM: CPT

## 2021-05-21 PROCEDURE — U0003 INFECTIOUS AGENT DETECTION BY NUCLEIC ACID (DNA OR RNA); SEVERE ACUTE RESPIRATORY SYNDROME CORONAVIRUS 2 (SARS-COV-2) (CORONAVIRUS DISEASE [COVID-19]), AMPLIFIED PROBE TECHNIQUE, MAKING USE OF HIGH THROUGHPUT TECHNOLOGIES AS DESCRIBED BY CMS-2020-01-R: HCPCS

## 2021-05-21 PROCEDURE — 83880 ASSAY OF NATRIURETIC PEPTIDE: CPT

## 2021-05-21 PROCEDURE — 82533 TOTAL CORTISOL: CPT

## 2021-05-21 PROCEDURE — 85306 CLOT INHIBIT PROT S FREE: CPT

## 2021-05-21 PROCEDURE — 85730 THROMBOPLASTIN TIME PARTIAL: CPT

## 2021-05-21 PROCEDURE — 86022 PLATELET ANTIBODIES: CPT

## 2021-05-21 PROCEDURE — 85302 CLOT INHIBIT PROT C ANTIGEN: CPT

## 2021-05-21 PROCEDURE — 74011000250 HC RX REV CODE- 250: Performed by: INTERNAL MEDICINE

## 2021-05-21 PROCEDURE — 85652 RBC SED RATE AUTOMATED: CPT

## 2021-05-21 PROCEDURE — 94660 CPAP INITIATION&MGMT: CPT

## 2021-05-21 PROCEDURE — 81001 URINALYSIS AUTO W/SCOPE: CPT

## 2021-05-21 PROCEDURE — 84134 ASSAY OF PREALBUMIN: CPT

## 2021-05-21 PROCEDURE — 86592 SYPHILIS TEST NON-TREP QUAL: CPT

## 2021-05-21 PROCEDURE — 81240 F2 GENE: CPT

## 2021-05-21 PROCEDURE — 82962 GLUCOSE BLOOD TEST: CPT

## 2021-05-21 PROCEDURE — 65660000001 HC RM ICU INTERMED STEPDOWN

## 2021-05-21 PROCEDURE — 74011250637 HC RX REV CODE- 250/637: Performed by: NURSE PRACTITIONER

## 2021-05-21 RX ADMIN — IRON SUCROSE 200 MG: 20 INJECTION, SOLUTION INTRAVENOUS at 01:24

## 2021-05-21 RX ADMIN — PRASUGREL HYDROCHLORIDE 10 MG: 10 TABLET, FILM COATED ORAL at 08:13

## 2021-05-21 RX ADMIN — RANOLAZINE 500 MG: 500 TABLET, FILM COATED, EXTENDED RELEASE ORAL at 08:19

## 2021-05-21 RX ADMIN — ERGOCALCIFEROL 50000 UNITS: 1.25 CAPSULE, LIQUID FILLED ORAL at 01:24

## 2021-05-21 RX ADMIN — TAMSULOSIN HYDROCHLORIDE 0.4 MG: 0.4 CAPSULE ORAL at 08:13

## 2021-05-21 RX ADMIN — MILRINONE LACTATE IN DEXTROSE 0.12 MCG/KG/MIN: 200 INJECTION, SOLUTION INTRAVENOUS at 18:42

## 2021-05-21 RX ADMIN — FAMOTIDINE 20 MG: 20 TABLET ORAL at 18:29

## 2021-05-21 RX ADMIN — IRON SUCROSE 200 MG: 20 INJECTION, SOLUTION INTRAVENOUS at 23:47

## 2021-05-21 RX ADMIN — BUMETANIDE 2 MG: 0.25 INJECTION INTRAMUSCULAR; INTRAVENOUS at 08:13

## 2021-05-21 RX ADMIN — ASPIRIN 81 MG: 81 TABLET, COATED ORAL at 08:12

## 2021-05-21 RX ADMIN — FAMOTIDINE 20 MG: 20 TABLET ORAL at 08:12

## 2021-05-21 RX ADMIN — EPLERENONE 25 MG: 25 TABLET, FILM COATED ORAL at 08:13

## 2021-05-21 RX ADMIN — ROSUVASTATIN 10 MG: 10 TABLET, FILM COATED ORAL at 21:14

## 2021-05-21 RX ADMIN — Medication 10 ML: at 21:14

## 2021-05-21 RX ADMIN — Medication 10 ML: at 14:04

## 2021-05-21 RX ADMIN — Medication 10 ML: at 06:53

## 2021-05-21 RX ADMIN — BUMETANIDE 2 MG: 0.25 INJECTION INTRAMUSCULAR; INTRAVENOUS at 14:03

## 2021-05-21 RX ADMIN — ISOSORBIDE MONONITRATE 30 MG: 30 TABLET, EXTENDED RELEASE ORAL at 08:13

## 2021-05-21 NOTE — PROGRESS NOTES
1930: Bedside shift change report given to [address] [place] [address] (oncoming nurse) by [facility] (offgoing nurse). Report included the following information SBAR, Intake/Output, MAR, Accordion, Recent Results, Med Rec Status and Cardiac Rhythm NSR/SB, 1st AVB, frequent PACs. 2335: Pt transported to radiology for scheduled PA/Lat with this RN; pt transported on tele, remains on milrinone drip    2350: Pt returned to floor    0151: 11 beats VTach noted on tele, pt asymptomatic; daily labs recently sent, will continue to monitor    [id number]: Bedside shift change report given to [facility] (oncoming nurse) by [address] [place] [address] (offgoing nurse). Report included the following information SBAR, Intake/Output, MAR, Accordion, Recent Results, Med Rec Status and Cardiac Rhythm NSR/SB/Sinus Arrhythmia and intermittent pacing w/ 1st AVB and PACs.

## 2021-05-21 NOTE — PROGRESS NOTES
[facility] in [place], [place]        LVAD/Heart Transplant workup initiated per Dr. Alayna Dooley request. Appropriate labs, imaging and consultsultation orders placed per Dr. Alayna Dooley (verbal order read back).

## 2021-05-21 NOTE — PROGRESS NOTES
0730: Bedside shift change report given to [address] [place] [address],[address] (oncoming nurse) by Lillie Jones (offgoing nurse). Report included the following information SBAR, Kardex, Intake/Output, MAR, Recent Results, and Cardiac Rhythm NSR, first degree block . 1445: Patient left the unit to go to CT with nurse and monitor    1530: Patient returned to the unit from CT with nurse and monitor. 1930: Bedside shift change report given to [facility] (oncoming nurse) by Ashleigh RN (offgoing nurse). Report included the following information SBAR, Kardex, Intake/Output, MAR, Recent Results and Cardiac Rhythm NSR. first degree block. Problem: Diabetes Self-Management  Goal: *Disease process and treatment process  Description: Define diabetes and identify own type of diabetes; list 3 options for treating diabetes. Outcome: Progressing Towards Goal  Goal: *Incorporating nutritional management into lifestyle  Description: Describe effect of type, amount and timing of food on blood glucose; list 3 methods for planning meals. Outcome: Progressing Towards Goal  Goal: *Incorporating physical activity into lifestyle  Description: State effect of exercise on blood glucose levels. Outcome: Progressing Towards Goal  Goal: *Developing strategies to promote health/change behavior  Description: Define the ABC's of diabetes; identify appropriate screenings, schedule and personal plan for screenings. Outcome: Progressing Towards Goal  Goal: *Using medications safely  Description: State effect of diabetes medications on diabetes; name diabetes medication taking, action and side effects. Outcome: Progressing Towards Goal  Goal: *Monitoring blood glucose, interpreting and using results  Description: Identify recommended blood glucose targets  and personal targets.   Outcome: Progressing Towards Goal  Goal: *Prevention, detection, treatment of acute complications  Description: List symptoms of hyper- and hypoglycemia; describe how to treat low blood sugar and actions for lowering  high blood glucose level. Outcome: Progressing Towards Goal  Goal: *Prevention, detection and treatment of chronic complications  Description: Define the natural course of diabetes and describe the relationship of blood glucose levels to long term complications of diabetes. Outcome: Progressing Towards Goal  Goal: *Developing strategies to address psychosocial issues  Description: Describe feelings about living with diabetes; identify support needed and support network  Outcome: Progressing Towards Goal  Goal: *Insulin pump training  Outcome: Progressing Towards Goal  Goal: *Sick day guidelines  Outcome: Progressing Towards Goal  Goal: *Patient Specific Goal (EDIT GOAL, INSERT TEXT)  Outcome: Progressing Towards Goal     Problem: Patient Education: Go to Patient Education Activity  Goal: Patient/Family Education  Outcome: Progressing Towards Goal     Problem: Falls - Risk of  Goal: *Absence of Falls  Description: Document Gabriel Fall Risk and appropriate interventions in the flowsheet.   Outcome: Progressing Towards Goal  Note: Fall Risk Interventions:            Medication Interventions: Teach patient to arise slowly, Patient to call before getting OOB                   Problem: Patient Education: Go to Patient Education Activity  Goal: Patient/Family Education  Outcome: Progressing Towards Goal     Problem: High Risk or History of SRUTHI  Goal: Recognition of SRUTHI or High Risk for SRUTHI  Outcome: Progressing Towards Goal  Goal: Maintain Patent Airway and Adequate Oxygenation  Outcome: Progressing Towards Goal  Goal: Avoid Over-sedation  Outcome: Progressing Towards Goal  Goal: Maintenance Care of SRUTHI  Outcome: Progressing Towards Goal     Problem: Patient Education: Go to Patient Education Activity  Goal: Patient/Family Education  Outcome: Progressing Towards Goal     Problem: Patient Education: Go to Patient Education Activity  Goal: Patient/Family Education  Outcome: Progressing Towards Goal     Problem: Heart Failure: Day 2  Goal: Activity/Safety  Outcome: Progressing Towards Goal  Goal: Consults, if ordered  Outcome: Progressing Towards Goal  Goal: Diagnostic Test/Procedures  Outcome: Progressing Towards Goal  Goal: Nutrition/Diet  Outcome: Progressing Towards Goal  Goal: Discharge Planning  Outcome: Progressing Towards Goal  Goal: Medications  Outcome: Progressing Towards Goal  Goal: Respiratory  Outcome: Progressing Towards Goal  Goal: Treatments/Interventions/Procedures  Outcome: Progressing Towards Goal  Goal: Psychosocial  Outcome: Progressing Towards Goal  Goal: *Oxygen saturation within defined limits  Outcome: Progressing Towards Goal  Goal: *Hemodynamically stable  Outcome: Progressing Towards Goal  Goal: *Optimal pain control at patient's stated goal  Outcome: Progressing Towards Goal  Goal: *Anxiety reduced or absent  Outcome: Progressing Towards Goal  Goal: *Demonstrates progressive activity  Outcome: Progressing Towards Goal

## 2021-05-21 NOTE — PROGRESS NOTES
Met with patient to discuss initiating LVAD workup. Patient verbalized \"I hope I won't need it\" but was agreeable. GI (EGD/Colonoscopy): Patient states he is scheduled for a colonoscopy on May 31st with Dr. Amy Stone at [facility], but his wife felt he was too weak to have the procedure done. Dentist: Patient states he sees a dentist at the [facility]. He does not recall the name, but it has been over a year since his last visit. Podiatrist: Patient sees a podiatrist at the [facility] but is unable to recall his name. (Will call and request records). Ophthalmologist: Patient states his doctor has retired and closed the office at the beginning of the Covid pandemic. Will check in with patient later with LVAD education.

## 2021-05-21 NOTE — PROGRESS NOTES
05/21/21 0236   Oxygen Therapy   O2 Sat (%) 95 %   Pulse via Oximetry 62 beats per minute   O2 Device CPAP nasal   Skin Assessment Clean, dry, & intact   Skin Protection for O2 Device No   PEEP/CPAP (cm H2O) 8 cm H20   FIO2 (%) 21 %   CPAP/BIPAP   CPAP/BIPAP Start/Stop On   Device Mode CPAP   $$ CPAP Daily Yes   [employer] ID # [id number]   Mask Type and Size Large;Nasal mask   Skin Condition Intact   PIP Observed 8 cm H20   EPAP (cm H2O) 8 cm H2O   Vt Spont (ml) 439 ml   Ve Observed (l/min) 9.9 l/min   Total RR (Spontaneous) 18 breaths per minute   Leak (Estimated) 0 L/min     Patient does not have home CPAP unit available. Patient S/U on our CPAP. Patient does not know home setting. RT placed pt on +8cmH2O. Pt states he feels comfortable.

## 2021-05-21 NOTE — PROGRESS NOTES
SW met with patient in his room to introduced role in his care. We discussed importance of caregiver pre/post LVAD implantation, patient was provided opportunity to ask questions. SW and patient updatd his AMD, last one was completed in 2014, a copy was given to patient and and the original to be scanned into the patient's medical record. Patient's spouse, Christa Tsang joined us and SW explained to her importance of caregiver, she confirmed her willingness to participate in his care. Gonzaloankush Lazarwalter named their daughter Cristiane Adame and grandaupaulter, Julisa Andreia as potential caregivers. SW showed Shira Moreno where new AMD was placed in patient's room and explained the changes made. SW will continue to follow.

## 2021-05-21 NOTE — NURSE NAVIGATOR
Chart reviewed by Heart Failure Nurse Navigator. Heart Failure database completed. EF:  20/25%     ACEi/ARB/ARNi: Held     BB: Contraindicated RV dysfunction    Aldosterone Antagonist: Inspra    Obstructive Sleep Apnea Screening: Y/ cpap  N/3   STOP-BANG score:   Referred to Sleep Medicine:     CRT ICD    NYHA Functional Class IV     Heart Failure Teach Back in Patient Education. Heart Failure Avoiding Triggers on Discharge Instructions. Cardiologist: [facility]      Post discharge follow up phone call to be made within 48-72 hours of discharge.

## 2021-05-21 NOTE — H&P
[address] [facility] in [address] [facility], [address] [facility] Note    Patient name: Mario Torres  Patient : 1945  Patient MRN: [id number]  Date of service: 21    Primary care physician: Jenn Herrera MD  Primary general cardiologist:  Dr. Rebecca Buchanan    Primary [facility] cardiologist: Ariadna Hernandez MD     CHIEF COMPLAINT:  Chronic systolic heart failure     PLAN OF CARE:  · Severe ischemic cardiomyopathy, LVEF 20-25%; stage D, NYHA class IV symptoms  · Admission for initiation of palliative inotropic support and in-patient LVAD workup  · Tentative plan will be to discharge home on inotropes to optimize RV function/nutrition/conditioning pre-op if patient decides on LVAD surgery     PLAN:  Continue current medical therapy for heart failure  Obtain non-invasive hemodynamics with NICOM and 6MW prior to initiation of inotropes  Start milrinone 0.125mcg/kg/min and uptitrate to 0.375mcg/kg/min over next 3 days  Discontinue coreg due to RV dysfunction on echo and in ancitipation of surgery  Discontinue ACEi in anticipation of cardiac surgery  Continue current dose of eplerenone 25mg daily  Patient is not taking SGLT2 inhibitor; check HgbA1C  Change bumex to 1mg IV daily; elevated JVD, waiting for labs  Not on allopurinol or uloric, check uric acid level  Continue baby ASA and effient (note: may need effient washout prior to surgery)  Continue current dose of statin, check lipid profile, CPK and LFT  Continue ranolazine and imdur 30mg daily  Check ICD interrogation; retrieve records from EP cardiology at [address] [place] [address] Drive CPAP therapy; home CPAP in hospital  Routine HF labs: CBC, BMP, Mg, LFT, uric acid, pro-NT-BNP, lactic, iron profile with ferritin, TSH, vitamin D level, lipid profile, CPK, gammopathy profile, hepatitis panel, comprehensive REBECCA, HGBA1C, PSA   Provided educational materials; HF education  [facility] decision tool re: LVAD therapy and hospice   Provided advanced care plan forms to be filled out    Palliative care consultation  Nutritionist consult  GI consult if patient agrees to VAD workup     IMPRESSION:  Fatigue at rest  Shortness of breath with minimal exertion  Volume overload  Acute on chronic systolic heart failure  · Stage D, NYHA class IV symptoms  · Non-ischemic cardiomyopathy, LVEF 20% with LVEDD 6.2  · RV dysfunciton, TAPSE 1.22 (prelimnary read)  · TBili 1.5 likely from cardiac congestion  At risk of sudden cardiac death  · Recent cardiac arrest   · S/p ICD (1/2013, [employer] followed by [employer])  Coronary artery disease  · S/p multiple interventions  · S/p 4V CABG (8/2012)  · LHC (7/2019) severe stenosis of LIMA to LAD anastomosis site, SVG graft to OM is occluded, SVG graft to RCA 40-50%, LAD occluded proximally, severe proximal LCx, RCA is the long . · PET (6/2019) EF 24% with anterior lateral and inferior reversible defect  Cardiac risk factors  · HTN  · HL  · DM2  · SRUTHI on CPAP  · MIld carotid stenosis  Anemia, microcytic  · Iron deficiency  DVT with filter     CARDIAC IMAGING:  Echo (5/20/21)  · LV: Estimated LVEF is 20 - 25%. Normal wall thickness. Mildly dilated left ventricle. Severely and globally reduced systolic function. Severe (grade 4) left ventricular diastolic dysfunction. · LA: Severely dilated left atrium. · RA: Severely dilated right atrium. · MV: Moderate mitral valve regurgitation is present. · TV: Moderate tricuspid valve regurgitation is present. · AO: Mild aortic root dilatation. · RV: Pacer/ICD present.   · LVED 6.2cm  EKG pending  LHC as above  NST as above  ICD interrogation pending     HEMODYNAMICS:  RHC not done  CPEST not done  6MW not done     OTHER IMAGING:  CXR (6/17/21) clear  CT chest not done     HISTORY OF PRESENT ILLNESS:  I had the pleasure of seeing Adolfo Rose in [address] [facility] [address] [place] [address] Sr is a 77 y.o. male with h/o HTN, HL, DM2, SRUTHI on CPAP, chronic systolic heart failure, stage D, NYHA class IV symptoms, non-ischemic cardiomyopathy, LVEF 20% with LVEDD 6.2, RV dysfunciton, TAPSE 1.22, TBili 1.5 likely from cardiac congestion, recent cardiac arrest s/p ICD (1/2013, [employer] followed by [facility]), coronary artery disease s/p multiple interventions s/p 4V CABG (8/2012), LHC (7/2019) severe stenosis of LIMA to LAD anastomosis site, SVG graft to OM is occluded, SVG graft to RCA 40-50%, LAD occluded proximally, severe proximal LCx, RCA is the long . PET (6/2019) EF 24% with anterior lateral and inferior reversible defect. Anemia, microcytic with iron deficiency, DVT with filter.     [facility] Dr. Fei Painter for evaluation of options re: management of advanced heart failure symptoms.     INTERVAL HISTORY:  Today, patient presents for initial clinic visit accompanied by his wife.     Patient is doing poorly. Patient has great difficulty walking as short distance as from the waiting room to clinic exam room.      Patient walked to our clinic from parking garage and was very tired. Patient can walk not more than 0.5 block without symptoms of fatigue or shortness of breath. Patient can walk only few steps of stairs without symptoms of fatigue or shortness of breath. Patient can perform home activities without problem and routinely participates in daily walking for more than 15 minutes.      Patient denies symptoms of volume overload or leg edema. Patient denies abdominal bloating or change of appetite. Patient lost 35 lbs over last year due to loss of appetite. He has constant nausea recently with vomiting. Has cardiac asthma symptoms in supine position.      Patient has 2 pillow orthopnea orthopnea, denies PND or nocturia.     Patient denies irregular heart rate or palpitations. No presyncope or syncope.  ICD has not fired.     Patient denies other cardiac symptoms such as chest pain or leg pain with walking.      Patient is compliant with fluid restriction and taking medications as prescribed. Patient manages his own medications.      REVIEW OF SYSTEMS:  General: Denies fever, night sweats. Ear, nose and throat: Denies difficulty hearing, sinus problems, runny nose, post-nasal drip, ringing in ears, mouth sores, loose teeth, ear pain, nosebleeds, sore throate, facial pain or numbess  Cardiovascular: see above in the interval history  Respiratory: Denies cough, wheezing, sputum production, hemoptysis. Gastrointestinal: Denies heartburn, constipation, diarrhea, abdominal pain, nausea, vomiting, difficulty swallowing, blood in stool  Kidney and bladder: Denies painful urination, frequent urination, urgency  Musculoskeletal: Denies joint pain, muscle weakness  Skin and hair: Denies change in existing skin lesions, hair loss or increase, breast changes    PHYSICAL EXAM:  Visit Vitals  /70 (BP 1 Location: Left upper arm, BP Patient Position: At rest)   Pulse (!) 59   Temp 97.5 °F (36.4 °C)   Resp 16   Wt 215 lb 2.7 oz (97.6 kg)   SpO2 98%   BMI 28.39 kg/m²     General: Patient is well developed, well-nourished in no acute distress  HEENT: Normocephalic and atraumatic. No scleral icterus. Pupils are equal, round and reactive to light and accomodation. No conjunctival injection. Oropharynx is clear. Neck: Supple. No evidence of thyroid enlargements or lymphadenopathy. JVD: Cannot be appreciated   Lungs: Breath sounds are equal and clear bilaterally. No wheezes, rhonchi, or rales. Heart: Regular rate and rhythm with normal S1 and S2. No murmurs, gallops or rubs. Abdomen: Soft, no mass or tenderness. No organomegaly or hernia. Bowel sounds present. Genitourinary and rectal: deferred  Extremities: No cyanosis, clubbing, or edema. Neurologic: No focal sensory or motor deficits are noted. Grossly intact. Psychiatric: Awake, alert an doriented x 3.  Appropriate mood and affect. Skin: Warm, dry and well perfused. No lesions, nodules or rashes are noted. PAST MEDICAL HISTORY:  Past Medical History:   Diagnosis Date    CAD (coronary artery disease) '90 '97, '13 x 2    MI, code ice in 2013 at [facility]    Calculus of kidney     Colonic polyps     Congestive heart failure, unspecified     Diabetes ([facility] [place] [address]     Gastritis     Hypercholesterolemia     Sleep apnea        PAST SURGICAL HISTORY:  Past Surgical History:   Procedure Laterality Date    COLONOSCOPY  04/06/2011    16, due 21    ENDOSCOPY, COLON, DIAGNOSTIC      11, due 16    HX CORONARY ARTERY BYPASS GRAFT  8/22/12    x 4 vessel by MISSY Ramirez    HX HEENT      LASIK    HX PACEMAKER PLACEMENT  1/30/13    OK CARDIAC SURG PROCEDURE UNLIST  2012    x 4 vessels       FAMILY HISTORY:  Family History   Problem Relation Age of Onset    Heart Disease Mother         MI    Heart Disease Father         CAD & PVD    Lung Disease Father     Cancer Father         lung    Diabetes Maternal Grandmother     Heart Disease Other         CAD    Stroke Sister        SOCIAL HISTORY:  Social History     Socioeconomic History    Marital status:      Spouse name: Not on file    Number of children: Not on file    Years of education: Not on file    Highest education level: Not on file   Tobacco Use    Smoking status: Never Smoker    Smokeless tobacco: Never Used   Substance and Sexual Activity    Alcohol use: Yes     Alcohol/week: 0.0 standard drinks     Comment:  VERY RARE    Drug use: No     Social Determinants of Health     Financial Resource Strain:     Difficulty of Paying Living Expenses:    Food Insecurity:     Worried About Running Out of Food in the Last Year:     [address] in the Last Year:    Transportation Needs:     Lack of Transportation (Medical):      Lack of Transportation (Non-Medical):    Physical Activity:     Days of Exercise per Week:     Minutes of Exercise per Session:    Stress:     Feeling of Stress :    Social Connections:     Frequency of Communication with Friends and Family:     Frequency of Social Gatherings with Friends and Family:     Attends Yarsanism Services:     Active Member of Clubs or Organizations:     Attends Club or Organization Meetings:     Marital Status:        LABORATORY RESULTS:  Labs Latest Ref Rng & Units 5/20/2021 4/30/2021   WBC 4.1 - 11.1 K/uL 3.7(L) 4.3   RBC 4.10 - 5.70 M/uL 4.89 5.01   Hemoglobin 12.1 - 17.0 g/dL 11. 3(L) 11. 9(L)   Hematocrit 36.6 - 50.3 % 37.1 38.2   MCV 80.0 - 99.0 FL 75. 9(L) 76. 2(L)   Platelets 696 - 129 K/uL 110(L) 112(L)   Lymphocytes 12 - 49 % - 19   Monocytes 5 - 13 % - 10   Eosinophils 0 - 7 % - 1   Basophils 0 - 1 % - 1   Albumin 3.5 - 5.0 g/dL 3.8 3.9   Calcium 8.5 - 10.1 MG/DL 9.1 9.7   Glucose 65 - 100 mg/dL 125(H) 132(H)   BUN 6 - 20 MG/DL 18 18   Creatinine 0.70 - 1.30 MG/DL 1.18 1.17   Sodium 136 - 145 mmol/L 137 138   Potassium 3.5 - 5.1 mmol/L 4.1 4.6   TSH 0.36 - 3.74 uIU/mL 1.97 -   PSA 0.01 - 4.0 ng/mL 2.2 -   Some recent data might be hidden       ALLERGY:  Allergies   Allergen Reactions    Pcn [Penicillins] Hives        CURRENT MEDICATIONS:    Current Facility-Administered Medications:     [START ON 5/21/2021] aspirin delayed-release tablet 81 mg, 81 mg, Oral, DAILY, Terry, Danica B, NP    [START ON 5/21/2021] eplerenone (INSPRA) tablet 25 mg, 25 mg, Oral, DAILY, Terry, Danica B, NP    [START ON 5/21/2021] isosorbide mononitrate ER (IMDUR) tablet 30 mg, 30 mg, Oral, DAILY, Terry, Danica B, NP    [START ON 5/21/2021] prasugreL (EFFIENT) tablet 10 mg, 10 mg, Oral, DAILY, Terry, Danica B, NP    ranolazine ER (RANEXA) tablet 500 mg, 500 mg, Oral, BID, Terry, Danica B, NP, 500 mg at 05/20/21 2207    rosuvastatin (CRESTOR) tablet 10 mg, 10 mg, Oral, QHS, Terry, Danica B, NP, 10 mg at 05/20/21 2207    [START ON 5/21/2021] tamsulosin (FLOMAX) capsule 0.4 mg, 0.4 mg, Oral, DAILY, Terry, Danica B, NP   sodium chloride (NS) flush 5-40 mL, 5-40 mL, IntraVENous, Q8H, Terry, Danica B, NP, 10 mL at 05/20/21 2207    sodium chloride (NS) flush 5-40 mL, 5-40 mL, IntraVENous, PRN, Terry, Danica B, NP    acetaminophen (TYLENOL) tablet 650 mg, 650 mg, Oral, Q6H PRN **OR** acetaminophen (TYLENOL) suppository 650 mg, 650 mg, Rectal, Q6H PRN, Terry, Danica B, NP    polyethylene glycol (MIRALAX) packet 17 g, 17 g, Oral, DAILY PRN, Terry, Danica B, NP    promethazine (PHENERGAN) tablet 12.5 mg, 12.5 mg, Oral, Q6H PRN **OR** ondansetron (ZOFRAN) injection 4 mg, 4 mg, IntraVENous, Q6H PRN, Terry, Danica B, NP    famotidine (PEPCID) tablet 20 mg, 20 mg, Oral, BID, Terry, Danica B, NP, 20 mg at 05/20/21 1815    [START ON 5/21/2021] bumetanide (BUMEX) injection 1 mg, 1 mg, IntraVENous, DAILY, Terry, Danica B, NP    milrinone (PRIMACOR) 20 MG/100 ML D5W infusion, 0.125 mcg/kg/min, IntraVENous, CONTINUOUS, Terry, Danica B, NP, Last Rate: 3.7 mL/hr at 05/20/21 1933, 0.125 mcg/kg/min at 05/20/21 1933    Thank you for your referral and allowing me to participate in this patient's care.     Lloyd Woodward MD PhD  [address], [address]  Phone: [phone]  Fax: [phone]    PATIENT CARE TEAM:  Patient Care Team:  Jl Amador MD as PCP - Bharat Chaves MD as PCP - [facility]  Michele Vance MD as Physician (Cardiology)  Jose Sanches MD as Physician (Cardiology)  Lendell Libman., MD as Physician (Gastroenterology)  Anne Elizalde MD as Physician (Orthopedic Surgery)  Cadence Franklin MD as Physician (Ophthalmology)  Rockney Graff Wilms, MD as Physician ([address])  Shiela Perales MD (Cardiology)  Maryuri Crespo MD (Cardiology)

## 2021-05-21 NOTE — PROGRESS NOTES
[facility] in [place], [place]  Heart Failure Inpatient Note    Patient name: Damian Castillo  Patient : 1945  Patient MRN: [id number]  Date of service: 21    Primary care physician: Soniya Joyner MD  Primary general cardiologist:  Dr. Cholo Perkins    Primary AHF cardiologist: Siddhartha Perez MD     CHIEF COMPLAINT:  Chronic systolic heart failure     PLAN OF CARE:  · Severe ischemic cardiomyopathy, LVEF 20-25%; stage D, NYHA class IV symptoms  · Admission for initiation of palliative inotropic support and in-patient LVAD workup  · Tentative plan will be to discharge home on inotropes to optimize RV function/nutrition/conditioning pre-op if patient decides on LVAD surgery     PLAN:  Continue current medical therapy for heart failure  Cont milrinone 0.125mcg/kg/min- no up titration today   Obtain Nicom today   Discontinue coreg due to RV dysfunction on echo and in ancitipation of surgery  Discontinue ACEi in anticipation of cardiac surgery  Continue current dose of eplerenone 25mg daily  Patient is not taking SGLT2 inhibitor; HgA1c 7. Consider as OP  Increase to Bumex 2mg BID  Uric acid WNL  Continue baby ASA and effient (note: may need effient washout prior to surgery)  Continue current dose of statin, check lipid profile, CPK and LFT  Cont imdur 30mg daily  Hold Ranexa for HR  Start Lactulose for elevated ammonia likely due to passive hepatic congestion.    Check ICD interrogation; retrieve records from EP cardiology at [address] [place] [address] Drive CPAP therapy; home CPAP in hospital  Routine HF labs: CBC, BMP, Mg, LFT, uric acid, pro-NT-BNP, lactic, iron profile with ferritin, TSH, vitamin D level, lipid profile, CPK, gammopathy profile, hepatitis panel, comprehensive REBECCA, HGBA1C, PSA   Provided educational materials; HF education  [facility] decision tool re: LVAD therapy and hospice   Provided advanced care plan forms to be filled out    Palliative care consultation  Nutritionist consult  GI consult   VCS consulted for RHC/LHC on monday     IMPRESSION:  Fatigue at rest  Shortness of breath with minimal exertion  Volume overload  Acute on chronic systolic heart failure  · Stage D, NYHA class IV symptoms  · Non-ischemic cardiomyopathy, LVEF 20% with LVEDD 6.2  · RV dysfunciton, TAPSE 1.22 (prelimnary read)  · TBili 1.5 likely from cardiac congestion  At risk of sudden cardiac death  · Recent cardiac arrest   · S/p ICD (1/2013, [employer] followed by Timbo Jerome)  Coronary artery disease  · S/p multiple interventions  · S/p 4V CABG (8/2012)  · LHC (7/2019) severe stenosis of LIMA to LAD anastomosis site, SVG graft to OM is occluded, SVG graft to RCA 40-50%, LAD occluded proximally, severe proximal LCx, RCA is the long . · PET (6/2019) EF 24% with anterior lateral and inferior reversible defect  Cardiac risk factors  · HTN  · HL  · DM2  · SRUTHI on CPAP  · MIld carotid stenosis  Anemia, microcytic  · Iron deficiency  DVT with filter    INTERVAL HISTORY:  Admitted to [facility]  Started on Milrinone  Liver func improving  PBNP trending down      CARDIAC IMAGING:  Echo (5/20/21)  · LV: Estimated LVEF is 20 - 25%. Normal wall thickness. Mildly dilated left ventricle. Severely and globally reduced systolic function. Severe (grade 4) left ventricular diastolic dysfunction. · LA: Severely dilated left atrium. · RA: Severely dilated right atrium. · MV: Moderate mitral valve regurgitation is present. · TV: Moderate tricuspid valve regurgitation is present. · AO: Mild aortic root dilatation. · RV: Pacer/ICD present.   · LVED 6.2cm  EKG pending  LHC as above  NST as above  ICD interrogation pending     HEMODYNAMICS:  RHC not done  CPEST not done  6MW not done     OTHER IMAGING:  CXR (6/17/21) clear  CT chest not done     HISTORY OF PRESENT ILLNESS:  I had the pleasure of seeing Salomón Elder in [facility] at [address] in [place] is a 77 y.o. male with h/o HTN, HL, DM2, SRUTHI on CPAP, chronic systolic heart failure, stage D, NYHA class IV symptoms, non-ischemic cardiomyopathy, LVEF 20% with LVEDD 6.2, RV dysfunciton, TAPSE 1.22, TBili 1.5 likely from cardiac congestion, recent cardiac arrest s/p ICD (1/2013, [employer] followed by [address] [place] [address]), coronary artery disease s/p multiple interventions s/p 4V CABG (8/2012), LHC (7/2019) severe stenosis of LIMA to LAD anastomosis site, SVG graft to OM is occluded, SVG graft to RCA 40-50%, LAD occluded proximally, severe proximal LCx, RCA is the long . PET (6/2019) EF 24% with anterior lateral and inferior reversible defect. Anemia, microcytic with iron deficiency, DVT with filter.     Patient was referred to [facility] by Dr. Kole Ku for evaluation of options re: management of advanced heart failure symptoms.            REVIEW OF SYSTEMS:  General: Denies fever, night sweats. Ear, nose and throat: Denies difficulty hearing, sinus problems, runny nose, post-nasal drip, ringing in ears, mouth sores, loose teeth, ear pain, nosebleeds, sore throate, facial pain or numbess  Cardiovascular: see above in the interval history  Respiratory: Denies cough, wheezing, sputum production, hemoptysis. Gastrointestinal: Denies heartburn, constipation, diarrhea, abdominal pain, nausea, vomiting, difficulty swallowing, blood in stool  Kidney and bladder: Denies painful urination, frequent urination, urgency  Musculoskeletal: Denies joint pain, muscle weakness  Skin and hair: Denies change in existing skin lesions, hair loss or increase, breast changes    PHYSICAL EXAM:  Visit Vitals  /86 Comment: REPEAT   Pulse 68   Temp 97.4 °F (36.3 °C)   Resp 18   Wt 207 lb 14.3 oz (94.3 kg)   SpO2 96%   BMI 27.43 kg/m²     General: Patient is well developed, well-nourished in no acute distress  HEENT: Normocephalic and atraumatic. No scleral icterus.  Pupils are equal, round and reactive to light and accomodation. No conjunctival injection. Oropharynx is clear. Neck: Supple. No evidence of thyroid enlargements or lymphadenopathy. JVD: Cannot be appreciated   Lungs: Breath sounds are equal and clear bilaterally. No wheezes, rhonchi, or rales. Heart: Regular rate and rhythm with normal S1 and S2. No murmurs, gallops or rubs. Abdomen: Soft, no mass or tenderness. No organomegaly or hernia. Bowel sounds present. Genitourinary and rectal: deferred  Extremities: No cyanosis, clubbing, or edema. Neurologic: No focal sensory or motor deficits are noted. Grossly intact. Psychiatric: Awake, alert an doriented x 3. Appropriate mood and affect. Skin: Warm, dry and well perfused. No lesions, nodules or rashes are noted. PAST MEDICAL HISTORY:  Past Medical History:   Diagnosis Date    CAD (coronary artery disease) '90 '97, '13 x 2    MI, code ice in 2013 at [facility]    Calculus of kidney     Colonic polyps     Congestive heart failure, unspecified     Diabetes ([facility] [address]     Gastritis     Hypercholesterolemia     Sleep apnea        PAST SURGICAL HISTORY:  Past Surgical History:   Procedure Laterality Date    COLONOSCOPY  04/06/2011    16, due 21    ENDOSCOPY, COLON, DIAGNOSTIC      11, due 16    HX CORONARY ARTERY BYPASS GRAFT  8/22/12    x 4 vessel by S.  James    HX HEENT      LASIK    HX PACEMAKER PLACEMENT  1/30/13    AR CARDIAC SURG PROCEDURE UNLIST  2012    x 4 vessels       FAMILY HISTORY:  Family History   Problem Relation Age of Onset    Heart Disease Mother         MI    Heart Disease Father         CAD & PVD    Lung Disease Father     Cancer Father         lung    Diabetes Maternal Grandmother     Heart Disease Other         CAD    Stroke Sister        SOCIAL HISTORY:  Social History     Socioeconomic History    Marital status:      Spouse name: Not on file    Number of children: Not on file    Years of education: Not on file    Highest education level: Not on file   Tobacco Use    Smoking status: Never Smoker    Smokeless tobacco: Never Used   Substance and Sexual Activity    Alcohol use: Yes     Alcohol/week: 0.0 standard drinks     Comment:  VERY RARE    Drug use: No     Social Determinants of Health     Financial Resource Strain:     Difficulty of Paying Living Expenses:    Food Insecurity:     Worried About Running Out of Food in the Last Year:     [address] in the Last Year:    Transportation Needs:     Lack of Transportation (Medical):  Lack of Transportation (Non-Medical):    Physical Activity:     Days of Exercise per Week:     Minutes of Exercise per Session:    Stress:     Feeling of Stress :    Social Connections:     Frequency of Communication with Friends and Family:     Frequency of Social Gatherings with Friends and Family:     Attends Bahai Services:     Active Member of Clubs or Organizations:     Attends Club or Organization Meetings:     Marital Status:        LABORATORY RESULTS:  Labs Latest Ref Rng & Units 5/21/2021 5/20/2021 4/30/2021   WBC 4.1 - 11.1 K/uL 2. 7(L) 3. 7(L) 4.3   RBC 4.10 - 5.70 M/uL 4.22 4.89 5.01   Hemoglobin 12.1 - 17.0 g/dL 9.9(L) 11. 3(L) 11. 9(L)   Hematocrit 36.6 - 50.3 % 32. 1(L) 37.1 38.2   MCV 80.0 - 99.0 FL 76. 1(L) 75. 9(L) 76. 2(L)   Platelets 644 - 818 K/uL 94(L) 110(L) 112(L)   Lymphocytes 12 - 49 % - - 19   Monocytes 5 - 13 % - - 10   Eosinophils 0 - 7 % - - 1   Basophils 0 - 1 % - - 1   Albumin 3.5 - 5.0 g/dL 3.2(L) 3.8 3.9   Calcium 8.5 - 10.1 MG/DL 8.6 9.1 9.7   Glucose 65 - 100 mg/dL 100 125(H) 132(H)   BUN 6 - 20 MG/DL 16 18 18   Creatinine 0.70 - 1.30 MG/DL 1.07 1.18 1.17   Sodium 136 - 145 mmol/L 139 137 138   Potassium 3.5 - 5.1 mmol/L 3.6 4.1 4.6   TSH 0.36 - 3.74 uIU/mL - 1.97 -   PSA 0.01 - 4.0 ng/mL - 2.2 -   LDH 85 - 241 U/L 168 - -   Some recent data might be hidden       ALLERGY:  Allergies   Allergen Reactions    Pcn [Penicillins] Hives        CURRENT MEDICATIONS:    Current Facility-Administered Medications:     [START ON 5/22/2021] lactulose (CHRONULAC) 10 gram/15 mL solution 15 mL, 15 mL, Oral, DAILY, Terry, Danica B, NP    aspirin delayed-release tablet 81 mg, 81 mg, Oral, DAILY, Terry, Danica B, NP, 81 mg at 05/21/21 2665    eplerenone (INSPRA) tablet 25 mg, 25 mg, Oral, DAILY, Terry, Danica B, NP, 25 mg at 05/21/21 0813    isosorbide mononitrate ER (IMDUR) tablet 30 mg, 30 mg, Oral, DAILY, Terry, Danica B, NP, 30 mg at 05/21/21 0813    prasugreL (EFFIENT) tablet 10 mg, 10 mg, Oral, DAILY, Terry, Danica B, NP, 10 mg at 05/21/21 0813    [Held by provider] ranolazine ER (RANEXA) tablet 500 mg, 500 mg, Oral, BID, Terry, Danica B, NP, 500 mg at 05/21/21 0819    rosuvastatin (CRESTOR) tablet 10 mg, 10 mg, Oral, QHS, Terry, Danica B, NP, 10 mg at 05/20/21 2207    tamsulosin (FLOMAX) capsule 0.4 mg, 0.4 mg, Oral, DAILY, Terry, Danica B, NP, 0.4 mg at 05/21/21 0813    sodium chloride (NS) flush 5-40 mL, 5-40 mL, IntraVENous, Q8H, Terry, Danica B, NP, 10 mL at 05/21/21 0653    sodium chloride (NS) flush 5-40 mL, 5-40 mL, IntraVENous, PRN, Terry, Danica B, NP    acetaminophen (TYLENOL) tablet 650 mg, 650 mg, Oral, Q6H PRN **OR** acetaminophen (TYLENOL) suppository 650 mg, 650 mg, Rectal, Q6H PRN, Terry, Danica B, NP    polyethylene glycol (MIRALAX) packet 17 g, 17 g, Oral, DAILY PRN, Terry, Danica B, NP    promethazine (PHENERGAN) tablet 12.5 mg, 12.5 mg, Oral, Q6H PRN **OR** ondansetron (ZOFRAN) injection 4 mg, 4 mg, IntraVENous, Q6H PRN, Terry, Danica B, NP    famotidine (PEPCID) tablet 20 mg, 20 mg, Oral, BID, Terry, Danica B, NP, 20 mg at 05/21/21 0812    milrinone (PRIMACOR) 20 MG/100 ML D5W infusion, 0.125 mcg/kg/min, IntraVENous, CONTINUOUS, Terry, Danica B, NP, Last Rate: 3.7 mL/hr at 05/20/21 1933, 0.125 mcg/kg/min at 05/20/21 1933    iron sucrose (VENOFER) 200 mg in 0.9% sodium chloride 100 mL IVPB, 200 mg, IntraVENous, Q24H, aSrahi Barraza MD, Last Rate: 440 mL/hr at 05/21/21 0124, 200 mg at 05/21/21 0124    bumetanide (BUMEX) injection 2 mg, 2 mg, IntraVENous, BID, Sraahi Barraza MD, 2 mg at 05/21/21 0813    ergocalciferol capsule 50,000 Units, 50,000 Units, Oral, Q7D, Sarahi Barraza MD, 50,000 Units at 05/21/21 0124    Thank you for your referral and allowing me to participate in this patient's care. Adeline Siddiqui, NP  [address] [place] [address], [address]  Phone: [phone]    PATIENT CARE TEAM:  Patient Care Team:  Riddhi Krause MD as PCP - Emiliana Campos MD as PCP - [address] [place] [address] [facility]  Codie Baeza MD as Physician (Cardiology)  Yvette Alonso MD as Physician (Cardiology)  Maco Redd MD as Physician (Gastroenterology)  Sofia Marie MD as Physician (Orthopedic Surgery)  Branden Cedeno MD as Physician (Ophthalmology)  Kathleene Merry Wilms, MD as Physician ([address] [place] [address] Road)  Yuki Lutz MD (Cardiology)  Sarahi Barraza MD (Cardiology)     [facility] ATTENDING ADDENDUM    Patient was seen and examined in person. Data and notes were reviewed. I have discussed and agree with the plan as noted in the NP note above without further additions.     Vidal Albright MD PhD  Dian Neville

## 2021-05-21 NOTE — CONSULTS
Comprehensive Nutrition Assessment    Type and Reason for Visit: Consult    Nutrition Recommendations/Plan:    1. Encourage protein with all meals  2. Should be eating at least 75% of all meals  3. Add daily MVI      Nutrition Assessment:    Pt admitted for CHF. PMHx: CAD, CHF, DM. Admitted for initiation of inotropic therpay with LVAD workup up for severe ischemic cardiomyopathy. Advanced Heart Failure team following. Plans for RHC on Monday. Milrinone in place. Lactulose started today with elevated ammonia, likely d/t passive hepatic congestion. GI consult pending as part of LVAD workup. Pt and wife visited. Pt had been cutting back on intake earlier this year but then had PNA with further unintentional wt loss with decreased appetite. Likely also d/t cardiac issues. Pt does drink Glucerna ~1/day at home and discussed other high protein shakes along with regular meals and good protein intake for wt maintenance. Handouts provided and reviewed - likely would benefit from additional diet reinforcement. Will add Glucerna BID (440kcal, 20g protein) - chocolate. Severe wt loss of 7% x 3 months noted. Muscle and fat wasting observed. Some may be related to fluid status but also with decrease in appetite over past 3+ weeks.      Malnutrition Assessment:  Malnutrition Status:  Severe malnutrition    Context:  Chronic illness     Findings of the 6 clinical characteristics of malnutrition:   Energy Intake:  7 - 75% or less est energy requirements for 1 month or longer  Weight Loss:  7.0 - Greater than 7.5% over 3 months     Body Fat Loss:  7 - Severe body fat loss, Buccal region   Muscle Mass Loss:  7 - Severe muscle mass loss, Clavicles (pectoralis &deltoids), Hand (interosseous), Temples (temporalis)  Fluid Accumulation:  No significant fluid accumulation,     Strength:  Not performed     Nutritionally Significant Medications: bumex, ergocalciferol, iron sucrose, lactulose, effient, flomax, milrinone drip; PRN: phenergan, zofran    Estimated Daily Nutrient Needs:  Energy (kcal): 2072-2280kcal (MSJ x 1.2 x 1.1); Weight Used for Energy Requirements: Current (94.3k)  Protein (g): 103-122g (1.1-1.3g/kg);  Weight Used for Protein Requirements:  (94kg)  Fluid (ml/day): 2100 or per cardio; Method Used for Fluid Requirements: 1 ml/kcal    Nutrition Related Findings:       BM: 5/20  Edema: trace  Wounds:  None       Current Nutrition Therapies:   Diet: cardiac  Supplements: none  Meal intake:   Patient Vitals for the past 168 hrs:   % Diet Eaten   05/21/21 1130 51 - 75%   05/21/21 0807 76 - 100%   05/20/21 1622 76 - 100%     Anthropometric Measures:  · Height:  6' 1\" (185.4 cm)  · Current Body Wt:  93.9 kg (207 lb)   · Admission Body Wt:  215 lb 2.7 oz    · Usual Body Wt:        · Ideal Body Wt:  184:  112.5 %   Wt Readings from Last 10 Encounters:   05/21/21 94.3 kg (207 lb 14.3 oz)   05/20/21 97.7 kg (215 lb 6.4 oz)   04/26/21 99.2 kg (218 lb 12.8 oz)   02/09/21 101.3 kg (223 lb 6.4 oz)   01/22/20 105.5 kg (232 lb 9.6 oz)   07/18/19 105.6 kg (232 lb 12.8 oz)   06/17/19 108.1 kg (238 lb 6.4 oz)   01/17/19 108.9 kg (240 lb)   08/08/18 110.2 kg (243 lb)   01/09/18 108.9 kg (240 lb)     Nutrition Diagnosis:   · Increased nutrient needs related to cardiac dysfunction as evidenced by  (CHF)    · Severe malnutrition related to inadequate protein-energy intake, increased demand for energy/nutrients as evidenced by poor intake prior to admission, weight loss 7.5% in 3 months, severe loss of subcutaneous fat, severe muscle loss    Nutrition Interventions:   Food and/or Nutrient Delivery: Continue current diet  Nutrition Education and Counseling: Education completed  Coordination of Nutrition Care: Continue to monitor while inpatient    Goals:  Consumption of at least 75% meals in 5-7 days; wt maintenance       Nutrition Monitoring and Evaluation:   Behavioral-Environmental Outcomes: Knowledge or skill  Food/Nutrient Intake Outcomes: Food and nutrient intake, Supplement intake  Physical Signs/Symptoms Outcomes: Biochemical data, Fluid status or edema, Weight    Discharge Planning:     Too soon to determine     Jared Ospina, RD  [address] [facility], Pager #[phone] or via [employer]

## 2021-05-21 NOTE — CONSULTS
[address], [facility] [address]  [phone]        We received a consult for LVAD evaluation, due for colonoscopy on 5/31. Patient was off the floor. He is scheduled for a cardiac catheterization on Monday. I discussed with heart failure NP, Bonnie Zuluaga. Please re-consult us next week following cardiac catheterization. Please call us with any questions. Thank you.       YOLI Serrano

## 2021-05-22 ENCOUNTER — APPOINTMENT (OUTPATIENT)
Dept: VASCULAR SURGERY | Age: 76
DRG: 287 | End: 2021-05-22
Attending: INTERNAL MEDICINE
Payer: MEDICARE

## 2021-05-22 LAB
ALBUMIN SERPL-MCNC: 3.1 G/DL (ref 3.5–5)
ALBUMIN/GLOB SERPL: 0.8 {RATIO} (ref 1.1–2.2)
ALP SERPL-CCNC: 175 U/L (ref 45–117)
ALT SERPL-CCNC: 15 U/L (ref 12–78)
ANION GAP SERPL CALC-SCNC: 7 MMOL/L (ref 5–15)
ANION GAP SERPL CALC-SCNC: 8 MMOL/L (ref 5–15)
AST SERPL-CCNC: 51 U/L (ref 15–37)
BILIRUB SERPL-MCNC: 1.2 MG/DL (ref 0.2–1)
BNP SERPL-MCNC: 3009 PG/ML
BUN SERPL-MCNC: 15 MG/DL (ref 6–20)
BUN SERPL-MCNC: 15 MG/DL (ref 6–20)
BUN/CREAT SERPL: 12 (ref 12–20)
BUN/CREAT SERPL: 13 (ref 12–20)
CALCIUM SERPL-MCNC: 8.7 MG/DL (ref 8.5–10.1)
CALCIUM SERPL-MCNC: 9.2 MG/DL (ref 8.5–10.1)
CHLORIDE SERPL-SCNC: 101 MMOL/L (ref 97–108)
CHLORIDE SERPL-SCNC: 101 MMOL/L (ref 97–108)
CO2 SERPL-SCNC: 28 MMOL/L (ref 21–32)
CO2 SERPL-SCNC: 30 MMOL/L (ref 21–32)
CREAT SERPL-MCNC: 1.15 MG/DL (ref 0.7–1.3)
CREAT SERPL-MCNC: 1.23 MG/DL (ref 0.7–1.3)
CREAT UR-MCNC: 13.9 MG/DL
ERYTHROCYTE [DISTWIDTH] IN BLOOD BY AUTOMATED COUNT: 18.3 % (ref 11.5–14.5)
GLOBULIN SER CALC-MCNC: 3.7 G/DL (ref 2–4)
GLUCOSE SERPL-MCNC: 188 MG/DL (ref 65–100)
GLUCOSE SERPL-MCNC: 194 MG/DL (ref 65–100)
HCT VFR BLD AUTO: 34.3 % (ref 36.6–50.3)
HGB BLD-MCNC: 10.8 G/DL (ref 12.1–17)
HIV 1+2 AB+HIV1 P24 AG SERPL QL IA: NONREACTIVE
HIV12 RESULT COMMENT, HHIVC: NORMAL
LEFT CCA DIST DIAS: 8.5 CM/S
LEFT CCA DIST SYS: 88.3 CM/S
LEFT CCA PROX DIAS: 14.4 CM/S
LEFT CCA PROX SYS: 114.9 CM/S
LEFT ECA DIAS: 4.8 CM/S
LEFT ECA SYS: 79.7 CM/S
LEFT ICA DIST DIAS: 14.9 CM/S
LEFT ICA DIST SYS: 69.5 CM/S
LEFT ICA MID DIAS: 14.9 CM/S
LEFT ICA MID SYS: 48.1 CM/S
LEFT ICA PROX DIAS: 12.2 CM/S
LEFT ICA PROX SYS: 60.1 CM/S
LEFT ICA/CCA SYS: 0.8
LEFT VERTEBRAL DIAS: 12.4 CM/S
LEFT VERTEBRAL SYS: 53.5 CM/S
MAGNESIUM SERPL-MCNC: 1.9 MG/DL (ref 1.6–2.4)
MAGNESIUM SERPL-MCNC: 2.3 MG/DL (ref 1.6–2.4)
MCH RBC QN AUTO: 23.4 PG (ref 26–34)
MCHC RBC AUTO-ENTMCNC: 31.5 G/DL (ref 30–36.5)
MCV RBC AUTO: 74.2 FL (ref 80–99)
MICROALBUMIN UR-MCNC: 3.11 MG/DL
MICROALBUMIN/CREAT UR-RTO: 224 MG/G (ref 0–30)
NRBC # BLD: 0 K/UL (ref 0–0.01)
NRBC BLD-RTO: 0 PER 100 WBC
PLATELET # BLD AUTO: 109 K/UL (ref 150–400)
PMV BLD AUTO: ABNORMAL FL (ref 8.9–12.9)
POTASSIUM SERPL-SCNC: 3.1 MMOL/L (ref 3.5–5.1)
POTASSIUM SERPL-SCNC: 3.7 MMOL/L (ref 3.5–5.1)
PROCALCITONIN SERPL-MCNC: 0.05 NG/ML
PROT C AG PPP IA-ACNC: 83 % (ref 60–150)
PROT SERPL-MCNC: 6.8 G/DL (ref 6.4–8.2)
RBC # BLD AUTO: 4.62 M/UL (ref 4.1–5.7)
RIGHT CCA DIST DIAS: 16.8 CM/S
RIGHT CCA DIST SYS: 89.8 CM/S
RIGHT CCA PROX DIAS: 24.6 CM/S
RIGHT CCA PROX SYS: 99 CM/S
RIGHT ECA DIAS: 12.9 CM/S
RIGHT ECA SYS: 78.1 CM/S
RIGHT ICA DIST DIAS: 18.6 CM/S
RIGHT ICA DIST SYS: 60.3 CM/S
RIGHT ICA MID DIAS: 17 CM/S
RIGHT ICA MID SYS: 54.6 CM/S
RIGHT ICA PROX DIAS: 22 CM/S
RIGHT ICA PROX SYS: 88.5 CM/S
RIGHT ICA/CCA SYS: 1
RIGHT VERTEBRAL DIAS: 15 CM/S
RIGHT VERTEBRAL SYS: 51.9 CM/S
SARS-COV-2, COV2NT: NOT DETECTED
SODIUM SERPL-SCNC: 137 MMOL/L (ref 136–145)
SODIUM SERPL-SCNC: 138 MMOL/L (ref 136–145)
WBC # BLD AUTO: 2.7 K/UL (ref 4.1–11.1)

## 2021-05-22 PROCEDURE — 74011250636 HC RX REV CODE- 250/636: Performed by: INTERNAL MEDICINE

## 2021-05-22 PROCEDURE — 80053 COMPREHEN METABOLIC PANEL: CPT

## 2021-05-22 PROCEDURE — 87389 HIV-1 AG W/HIV-1&-2 AB AG IA: CPT

## 2021-05-22 PROCEDURE — 84145 PROCALCITONIN (PCT): CPT

## 2021-05-22 PROCEDURE — 74011250637 HC RX REV CODE- 250/637: Performed by: NURSE PRACTITIONER

## 2021-05-22 PROCEDURE — 36415 COLL VENOUS BLD VENIPUNCTURE: CPT

## 2021-05-22 PROCEDURE — 99233 SBSQ HOSP IP/OBS HIGH 50: CPT | Performed by: INTERNAL MEDICINE

## 2021-05-22 PROCEDURE — 93880 EXTRACRANIAL BILAT STUDY: CPT

## 2021-05-22 PROCEDURE — 83735 ASSAY OF MAGNESIUM: CPT

## 2021-05-22 PROCEDURE — 94760 N-INVAS EAR/PLS OXIMETRY 1: CPT

## 2021-05-22 PROCEDURE — 74011000250 HC RX REV CODE- 250: Performed by: INTERNAL MEDICINE

## 2021-05-22 PROCEDURE — 65660000001 HC RM ICU INTERMED STEPDOWN

## 2021-05-22 PROCEDURE — 85027 COMPLETE CBC AUTOMATED: CPT

## 2021-05-22 PROCEDURE — 74011250637 HC RX REV CODE- 250/637: Performed by: INTERNAL MEDICINE

## 2021-05-22 PROCEDURE — 83880 ASSAY OF NATRIURETIC PEPTIDE: CPT

## 2021-05-22 PROCEDURE — 82043 UR ALBUMIN QUANTITATIVE: CPT

## 2021-05-22 RX ORDER — POLYETHYLENE GLYCOL 3350 17 G/17G
17 POWDER, FOR SOLUTION ORAL 2 TIMES DAILY
Status: DISCONTINUED | OUTPATIENT
Start: 2021-05-22 | End: 2021-05-25 | Stop reason: HOSPADM

## 2021-05-22 RX ORDER — MAGNESIUM SULFATE 1 G/100ML
1 INJECTION INTRAVENOUS ONCE
Status: COMPLETED | OUTPATIENT
Start: 2021-05-22 | End: 2021-05-22

## 2021-05-22 RX ORDER — MILRINONE LACTATE 0.2 MG/ML
0.25 INJECTION, SOLUTION INTRAVENOUS CONTINUOUS
Status: DISCONTINUED | OUTPATIENT
Start: 2021-05-22 | End: 2021-05-24

## 2021-05-22 RX ORDER — POTASSIUM CHLORIDE 750 MG/1
40 TABLET, FILM COATED, EXTENDED RELEASE ORAL 2 TIMES DAILY
Status: DISCONTINUED | OUTPATIENT
Start: 2021-05-22 | End: 2021-05-23

## 2021-05-22 RX ORDER — LANOLIN ALCOHOL/MO/W.PET/CERES
400 CREAM (GRAM) TOPICAL 2 TIMES DAILY
Status: DISCONTINUED | OUTPATIENT
Start: 2021-05-22 | End: 2021-05-23

## 2021-05-22 RX ORDER — HYDRALAZINE HYDROCHLORIDE 10 MG/1
10 TABLET, FILM COATED ORAL 3 TIMES DAILY
Status: DISCONTINUED | OUTPATIENT
Start: 2021-05-22 | End: 2021-05-23

## 2021-05-22 RX ADMIN — BUMETANIDE 2 MG: 0.25 INJECTION INTRAMUSCULAR; INTRAVENOUS at 09:28

## 2021-05-22 RX ADMIN — POTASSIUM CHLORIDE 40 MEQ: 750 TABLET, EXTENDED RELEASE ORAL at 09:19

## 2021-05-22 RX ADMIN — ROSUVASTATIN 10 MG: 10 TABLET, FILM COATED ORAL at 21:00

## 2021-05-22 RX ADMIN — MAGNESIUM SULFATE HEPTAHYDRATE 1 G: 1 INJECTION, SOLUTION INTRAVENOUS at 09:20

## 2021-05-22 RX ADMIN — TAMSULOSIN HYDROCHLORIDE 0.4 MG: 0.4 CAPSULE ORAL at 09:19

## 2021-05-22 RX ADMIN — FAMOTIDINE 20 MG: 20 TABLET ORAL at 17:28

## 2021-05-22 RX ADMIN — HYDRALAZINE HYDROCHLORIDE 10 MG: 10 TABLET, FILM COATED ORAL at 21:00

## 2021-05-22 RX ADMIN — HYDRALAZINE HYDROCHLORIDE 10 MG: 10 TABLET, FILM COATED ORAL at 09:19

## 2021-05-22 RX ADMIN — BUMETANIDE 2 MG: 0.25 INJECTION INTRAMUSCULAR; INTRAVENOUS at 15:09

## 2021-05-22 RX ADMIN — EPLERENONE 25 MG: 25 TABLET, FILM COATED ORAL at 09:19

## 2021-05-22 RX ADMIN — FAMOTIDINE 20 MG: 20 TABLET ORAL at 09:19

## 2021-05-22 RX ADMIN — POTASSIUM CHLORIDE 40 MEQ: 750 TABLET, EXTENDED RELEASE ORAL at 15:09

## 2021-05-22 RX ADMIN — PRASUGREL HYDROCHLORIDE 10 MG: 10 TABLET, FILM COATED ORAL at 09:19

## 2021-05-22 RX ADMIN — HYDRALAZINE HYDROCHLORIDE 10 MG: 10 TABLET, FILM COATED ORAL at 15:09

## 2021-05-22 RX ADMIN — ASPIRIN 81 MG: 81 TABLET, COATED ORAL at 09:19

## 2021-05-22 RX ADMIN — POLYETHYLENE GLYCOL 3350 17 G: 17 POWDER, FOR SOLUTION ORAL at 17:27

## 2021-05-22 RX ADMIN — Medication 10 ML: at 21:03

## 2021-05-22 RX ADMIN — Medication 400 MG: at 09:19

## 2021-05-22 RX ADMIN — ISOSORBIDE MONONITRATE 30 MG: 30 TABLET, EXTENDED RELEASE ORAL at 09:19

## 2021-05-22 RX ADMIN — LACTULOSE 15 ML: 20 SOLUTION ORAL at 09:19

## 2021-05-22 RX ADMIN — Medication 400 MG: at 17:28

## 2021-05-22 RX ADMIN — ACETAMINOPHEN 650 MG: 325 TABLET ORAL at 04:12

## 2021-05-22 RX ADMIN — Medication 10 ML: at 06:00

## 2021-05-22 RX ADMIN — MILRINONE LACTATE IN DEXTROSE 0.25 MCG/KG/MIN: 200 INJECTION, SOLUTION INTRAVENOUS at 15:03

## 2021-05-22 RX ADMIN — Medication 10 ML: at 15:10

## 2021-05-22 NOTE — PROGRESS NOTES
05/22/21 0246   CPAP/BIPAP   Device Mode CPAP, auto-titrating   $$ CPAP Daily   (Home Unit- no charge)   Mask Type and Size Large;Nasal mask   Skin Condition Intact   EPAP (cm H2O)   (Auto [id number])   Total RR (Spontaneous) 16 breaths per minute   Pt's Home Machine Yes (type/vendor)  (Respironics AirSense 10 w/ HH)     Patient stable on home CPAP unit. RT noted patient had mouth wide open. RT located a chin strap in his equipment bag. Patient happy to wear it. Will monitor. RN notified to continue line of communication.

## 2021-05-22 NOTE — CONSULTS
PULMONARY MEDICINE    Initial Physician Consultation Note    Name: Dwayne Mandel   : 1945   MRN: [id number]   Date: 2021      Subjective:   Consult Note: 2021   Requesting Physician: Dr. Cornelia Meier  Reason for consult: Lung mass    Medical records and data reviewed. Patient is a 77 y.o. male who presented to the hospital with fatigue. Patient has had progressive decline in functional capacity with dyspnea on exertion on minimal exertion. He reported orthopnea as well as pedal edema prior to admission. He has underlying history of coronary artery disease and has been found to have severe LV dysfunction with left ventricular ejection fraction of 20-25%. He is being followed by heart failure team and is being worked up for LVAD. CT scan of the chest that was done had shown irregular left upper lobe pulmonary mass and we were asked to see him in consultation. He shouldn't denies history of cough or hemoptysis. He has had weight loss over the past year. He himself is a nonsmoker but reports exposure to secondhand smoke from his parent's as well as other siblings. He has worked in construction business and has had an exposure at this pestis. He does not carry a previous diagnosis of asthma or COPD. He reports he had visited [facility] for symptoms of pneumonia 2 months ago and an x-ray was done at [facility] and apparently no abnormality was found. He is on dual platelet therapy with Effient as well as aspirin. Review of Systems:     A comprehensive 12 system review of systems was negative except for as documented in HPI    Assessment:     Left upper lobe lung mass, age indeterminate. Previous imaging from 2019 in [facility] did not show evidence of this abnormality.  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  Severe left ventricular dysfunction, workup for LVAD ongoing  Nonsmoker  Weight loss  Other medical problems per chart    Recommendations:   Left upper lobe lung mass is subpleural in location and easily accessible with transthoracic needle aspiration biopsy which could be arranged next week- he is on dual platelet therapy with Effient and interventional radiologist may prefer to hold this for a few days before biopsy can be attempted  Discussed recommendation with patient      Active Problem List:     Problem List  Date Reviewed: 4/30/2021        Codes Class    Severe protein-calorie malnutrition ([facility] ICD-10-CM: V12  ICD-9-CM: 262         Acute on chronic clinical systolic heart failure ([facility] ICD-10-CM: I50.23  ICD-9-CM: 428.23         Active advance directive on file ICD-10-CM: Z78.9  ICD-9-CM: V49.89         Type 2 diabetes, controlled, with peripheral circulatory disorder (HCC) ICD-10-CM: E11.51  ICD-9-CM: 250.70, 443.81         CHF, stage C ([facility] ICD-10-CM: I50.9  ICD-9-CM: 428.0         Mixed hyperlipidemia ICD-10-CM: E78.2  ICD-9-CM: 272.2         Proteinuria ICD-10-CM: R80.9  ICD-9-CM: 791.0         BPH without obstruction/lower urinary tract symptoms ICD-10-CM: N40.0  ICD-9-CM: 600.00         Hematuria, gross ICD-10-CM: R31.0  ICD-9-CM: 599.71         Cardiac arrest ([facility] ICD-10-CM: I46.9  ICD-9-CM: 427.5         Insomnia, unspecified ICD-10-CM: G47.00  ICD-9-CM: 780.52         Encounter for long-term (current) use of other medications ICD-10-CM: Z79.899  ICD-9-CM: V58.69         Calculus of kidney ICD-10-CM: N20.0  ICD-9-CM: 592.0         Coronary atherosclerosis of native coronary artery ICD-10-CM: I25.10  ICD-9-CM: 414.01         Benign neoplasm of colon ICD-10-CM: D12.6  ICD-9-CM: 211.3         Unspecified sleep apnea ICD-10-CM: G47.30  ICD-9-CM: 780.57         Reflux esophagitis ICD-10-CM: K21.00  ICD-9-CM: 530.11               Past Medical History:      has a past medical history of CAD (coronary artery disease) ('80  '97, '13 x 2), Calculus of kidney, Colonic polyps, Congestive heart failure, unspecified, Diabetes ([facility] Gastritis, Hypercholesterolemia, and Sleep apnea. Past Surgical History:      has a past surgical history that includes hx heent; colonoscopy (04/06/2011); endoscopy, colon, diagnostic; hx coronary artery bypass graft (8/22/12); hx pacemaker placement (1/30/13); and pr cardiac surg procedure unlist (2012). Home Medications:     Prior to Admission medications    Medication Sig Start Date End Date Taking? Authorizing Provider   ondansetron hcl (ZOFRAN) 4 mg tablet Take 1 Tab by mouth every eight (8) hours as needed for Nausea, Vomiting or Nausea or Vomiting. Patient not taking: Reported on 5/20/2021 4/30/21   Obed Robertson MD   rosuvastatin (CRESTOR) 10 mg tablet TAKE 1 TABLET NIGHTLY 4/26/21   Obed Robertson MD   ferrous sulfate (Iron) 325 mg (65 mg iron) tablet Take  by mouth Daily (before breakfast). Patient not taking: Reported on 5/20/2021    Provider, Historical   omeprazole (PRILOSEC) 20 mg capsule Take 1 Cap by mouth daily. Indications: indigestion 4/26/21   Obed Robertson MD   tamsulosin [facility]) 0.4 mg capsule TAKE 1 CAPSULE DAILY 3/28/21   Obed Robertson MD   fosinopriL (MONOPRIL) 10 mg tablet TAKE 1 TABLET DAILY 3/8/21   Obed Robertson MD   metFORMIN (GLUCOPHAGE) 500 mg tablet TAKE 2 TABLETS TWICE A DAY WITH MEALS 3/3/21   Stephenie Gustafson MD   eplerenone (INSPRA) 25 mg tablet Take 25 mg by mouth daily.     Provider, Historical   carvediloL (COREG) 6.25 mg tablet TAKE 1 TABLET TWICE A DAY WITH MEALS 1/11/21   Stephenie Gustafson MD   glipiZIDE SR (GLUCOTROL XL) 10 mg CR tablet TAKE 1 TABLET DAILY (DUE FOR OFFICE VISIT PLEASE CALL OFFICE TO SCHEDULE) 1/11/21   Stephenie Gustafson MD   glucose blood VI test strips (OneTouch Ultra Blue Test Strip) strip Use to test blood sugar daily 6/20/20   Stephenie Gustafson MD   lancets misc Use to test blood sugar daily 6/16/20   Obed Robertson MD   ranolazine ER (RANEXA) 500 mg SR tablet Take 1 Tab by mouth two (2) times a day. Per cardiology 20   Doug España MD   prasugrel (EFFIENT) 10 mg tablet TAKE 1 TABLET DAILY 19   Jannet Gustafson MD   bumetanide (BUMEX) 2 mg tablet Take  by mouth. 1/2 tab every other day. 19   Provider, Historical   isosorbide mononitrate ER (IMDUR) 30 mg tablet Take 30 mg by mouth daily. 19   Provider, Historical   nitroglycerin (NITROSTAT) 0.4 mg SL tablet 0.4 mg by SubLINGual route every five (5) minutes as needed. Patient not taking: Reported on 2021   Provider, Historical   acetaminophen (TYLENOL EXTRA STRENGTH) 500 mg tablet Take  by mouth every six (6) hours as needed. Patient not taking: Reported on 2021    Provider, Historical   aspirin delayed-release 81 mg tablet Take 81 mg by mouth daily. Provider, Historical       Allergies/Social/Family History: Allergies   Allergen Reactions    Pcn [Penicillins] Hives      Social History     Tobacco Use    Smoking status: Never Smoker    Smokeless tobacco: Never Used   Substance Use Topics    Alcohol use:  Yes     Alcohol/week: 0.0 standard drinks     Comment:  VERY RARE      Family History   Problem Relation Age of Onset    Heart Disease Mother         MI    Heart Disease Father         CAD & PVD    Lung Disease Father     Cancer Father         lung    Diabetes Maternal Grandmother     Heart Disease Other         CAD    Stroke Sister             Objective:   Vital Signs:  Visit Vitals  BP (!) 144/82 (BP 1 Location: Left upper arm, BP Patient Position: At rest)   Pulse 76   Temp 97.4 °F (36.3 °C)   Resp 17   Ht 6' 1\" (1.854 m)   Wt 91 kg (200 lb 9.9 oz)   SpO2 97%   BMI 26.47 kg/m²    O2 Flow Rate (L/min): 0 l/min O2 Device: None (Room air) Temp (24hrs), Av.6 °F (36.4 °C), Min:97.4 °F (36.3 °C), Max:98 °F (36.7 °C)           Intake/Output:     Intake/Output Summary (Last 24 hours) at 2021 1521  Last data filed at 2021 1511  Gross per 24 hour   Intake 780.62 ml   Output 4675 ml   Net -3894.38 ml       Physical Exam:   General:  Alert, cooperative, no distress, appears stated age. Head:  Normocephalic, without obvious abnormality, atraumatic. Eyes:  Conjunctivae/corneas clear. PERRL   Neck: Supple, symmetrical, no adenopathy, no carotid bruit and no JVD. Lungs:   Clear to auscultation bilaterally. Chest wall:  No tenderness or deformity. Heart:  Regular rate and rhythm, S1-S2 normal, no murmur, no click, rub or gallop. Abdomen:   Soft, non-tender. Bowel sounds present. No masses,  No organomegaly. Extremities: Atraumatic, no cyanosis or edema. Pulses: Palpable   Skin: No rashes or lesions   Neurologic: Grossly nonfocal         LABS AND  DATA: Personally reviewed  Recent Labs     05/22/21 0401 05/21/21 0138   WBC 2.7* 2.7*   HGB 10.8* 9.9*   HCT 34.3* 32.1*   * 94*     Recent Labs     05/22/21  0401 05/21/21 1819 05/21/21  0138     --  139   K 3.1*  --  3.6     --  105   CO2 28  --  26   BUN 15  --  16   CREA 1.15  --  1.07   *  --  100   CA 8.7 9.1 8.6   MG 1.9  --  1.9     Recent Labs     05/22/21  0401 05/21/21  0138   * 158*   TP 6.8 6.5   ALB 3.1* 3.2*   GLOB 3.7 3.3     Recent Labs     05/21/21 1818 05/21/21  0138   INR  --  1.3*   PTP  --  13.0*   APTT 26.8  --       Recent Labs     05/21/21  1606   PHI 7.53*   PCO2I 32.7*   PO2I 108*     Recent Labs     05/21/21 1818 05/20/21  1628   CPK  --  61   TROIQ <0.05  --        MEDS: Reviewed    Chest Imaging: personally reviewed and report checked    Tele- reviewed    Medical decision making:   I have reviewed the flowsheet and previous day's notes  Patient has acute or chronic illness that poses a threat to life or bodily function  Review and order of Clinical lab tests  Review and Order of Radiology tests  Independent visualization of Image            Thank you for allowing me to participate in this patient's care.     Cadence Shelby Leslie MD      [facility]

## 2021-05-22 NOTE — PROGRESS NOTES
[facility] in [place], [place]  Inpatient Consult Progress Note      Patient name: Andres Quintero  Patient : 1945  Patient MRN: [id number]  Consulting MD: Lloyd Gomez MD  Primary general cardiologist:  Dr. Susu Strong    Date of service: 21    CHIEF COMPLAINT:  Chronic systolic heart failure     PLAN OF CARE:  · Severe ischemic cardiomyopathy, LVEF 20-25%; stage D, NYHA class IV symptoms; admitted for initiation of palliative inotropic support and in-patient LVAD workup  · Tentative plan will be to discharge home on inotropes to optimize RV function/nutrition/conditioning pre-op if patient decides on LVAD surgery     PLAN:  Continue current medical therapy for heart failure  Increase milrinone 0.25mcg/kg/min  Discontinue coreg due to RV dysfunction   Discontinue ACEi in anticipation of cardiac surgery  Start hydralazine 10mg TID and continue imdur 30mg daily  Continue current dose of eplerenone 25mg daily  Patient is not taking SGLT2 inhibitor; HgA1c 7; consider as OP  Continue bumex 2mg IV twice daily; goal net negative 2 liters per day  Not on allopurinol, uric acid wnl  Continue baby ASA and effient (note: may need effient washout prior to surgery)  Continue current dose of statin, check lipid profile, CPK and LFT  Discontinued ranexa due to bradycardia and no angina symptoms  Continue daily lactulose for elevated ammonia likely due to passive hepatic congestion and constipation  PYP test next week  Need records re: indication for DVT filter?   Genetic testing for cardiomyopathy pending  Check ICD interrogation; retrieve records from EP cardiology at [address] [place] [address] Drive CPAP therapy; home CPAP in hospital  Provided educational materials; HF education  [employer] decision tool re: LVAD therapy and hospice   Provided advanced care plan forms to be filled out      Continue LVAD-DT workup:  · Pulmonary consult for nodules suspected for primary malignancy  · Hematology consult on Monday for pancytopenia, labs pending  · Palliative care consultation  · Nutritionist consult  · GI consult for scope next week; EGD/Cscope next week for dysphagia/vomiting and colon cancer screening + comment on liver dysfunction  · VCS consulted for RHC/LHC on Monday (primary cardiologists, Dr. Sangeetha Ghosh group)     IMPRESSION:  Fatigue at rest  Shortness of breath with minimal exertion  Volume overload  Acute on chronic systolic heart failure  · Stage D, NYHA class IV symptoms  · Non-ischemic cardiomyopathy, LVEF 20% with LVEDD 6.2  · RV dysfunction, TAPSE 1.22 (prelimnary read)  · TBili 1.5 likely from cardiac congestion  At risk of sudden cardiac death  · Recent cardiac arrest   · S/p ICD (1/2013, [employer] followed by [facility])  Coronary artery disease  · S/p multiple interventions  · S/p 4V CABG (8/2012)  · LHC (7/2019) severe stenosis of LIMA to LAD anastomosis site, SVG graft to OM is occluded, SVG graft to RCA 40-50%, LAD occluded proximally, severe proximal LCx, RCA is the long . · PET (6/2019) EF 24% with anterior lateral and inferior reversible defect  Cardiac risk factors  · HTN  · HL  · DM2  · SRUTHI on CPAP  · MIld carotid stenosis  Anemia, microcytic  · Iron deficiency  DVT with filter  Pulmonary nodules      INTERVAL HISTORY:  Feels better but still has cardiac asthma with supine cough  Hemodynamics look good, HR improved off ranexa  Constipated     CARDIAC IMAGING:  Echo (5/20/21)  · LV: Estimated LVEF is 20 - 25%. Normal wall thickness. Mildly dilated left ventricle. Severely and globally reduced systolic function. Severe (grade 4) left ventricular diastolic dysfunction. · LA: Severely dilated left atrium. · RA: Severely dilated right atrium. · MV: Moderate mitral valve regurgitation is present. · TV: Moderate tricuspid valve regurgitation is present. · AO: Mild aortic root dilatation. · RV: Pacer/ICD present.   · LVED 6.2cm  EKG pending  [facility] as above  NST as above  ICD interrogation pending     HEMODYNAMICS:  RHC not done  CPEST not done  6MW not done     OTHER IMAGING:  CXR (6/17/21) clear  CT chest not done     HISTORY OF PRESENT ILLNESS:  I had the pleasure of seeing Heron Oconnell  in [facility] at [address] [place] [address] in [address] [place] [address] a 77 y. o. male with h/o HTN, HL, DM2, SRUTHI on CPAP, chronic systolic heart failure, stage D, NYHA class IV symptoms, non-ischemic cardiomyopathy, LVEF 20% with LVEDD 6.2, RV dysfunciton, TAPSE 1.22, TBili 1.5 likely from cardiac congestion, recent cardiac arrest s/p ICD (1/2013, [facility] followed by [facility]), coronary artery disease s/p multiple interventions s/p 4V CABG (8/2012), LHC (7/2019) severe stenosis of LIMA to LAD anastomosis site, SVG graft to OM is occluded, SVG graft to RCA 40-50%, LAD occluded proximally, severe proximal LCx, RCA is the long . PET (6/2019) EF 24% with anterior lateral and inferior reversible defect. Anemia, microcytic with iron deficiency, DVT with filter.     Patient was referred to [facility] Clinic by Dr. Isadora Perdomo for evaluation of options re: management of advanced heart failure symptoms. PHYSICAL EXAM:  Visit Vitals  BP (!) 141/69 (BP 1 Location: Left upper arm, BP Patient Position: At rest)   Pulse 69   Temp 97.6 °F (36.4 °C)   Resp 16   Ht 6' 1\" (1.854 m)   Wt 200 lb 9.9 oz (91 kg)   SpO2 98%   BMI 26.47 kg/m²     General: Patient is well developed, well-nourished in no acute distress  HEENT: Normocephalic and atraumatic. No scleral icterus. Pupils are equal, round and reactive to light and accomodation. No conjunctival injection. Oropharynx is clear. Neck: Supple. No evidence of thyroid enlargements or lymphadenopathy. JVD: Cannot be appreciated   Lungs: Breath sounds are equal and clear bilaterally. No wheezes, rhonchi, or rales. Heart: Regular rate and rhythm with normal S1 and S2.  No murmurs, gallops or rubs.  Abdomen: Soft, no mass or tenderness. No organomegaly or hernia. Bowel sounds present. Genitourinary and rectal: deferred  Extremities: No cyanosis, clubbing, or edema. Neurologic: No focal sensory or motor deficits are noted. Grossly intact. Psychiatric: Awake, alert an doriented x 3. Appropriate mood and affect. Skin: Warm, dry and well perfused. No lesions, nodules or rashes are noted. REVIEW OF SYSTEMS:  General: Denies fever, night sweats. Ear, nose and throat: Denies difficulty hearing, sinus problems, runny nose, post-nasal drip, ringing in ears, mouth sores, loose teeth, ear pain, nosebleeds, sore throate, facial pain or numbess  Cardiovascular: see above in the interval history  Respiratory: Denies cough, wheezing, sputum production, hemoptysis. Gastrointestinal: Denies heartburn, constipation, intolerance to certain foods, diarrhea, abdominal pain, nausea, vomiting, difficulty swallowing, blood in stool  Kidney and bladder: Denies painful urination, frequent urination, urgency, prostate problems and impotence  Musculoskeletal: Denies joint pain, muscle weakness  Skin and hair: Denies change in existing skin lesions, hair loss or increase, breast changes    PAST MEDICAL HISTORY:  Past Medical History:   Diagnosis Date    CAD (coronary artery disease) '90 '97, '13 x 2    [place] [facility]    Calculus of kidney     Colonic polyps     Congestive heart failure, unspecified     Diabetes ([facility]     Gastritis     Hypercholesterolemia     Sleep apnea        PAST SURGICAL HISTORY:  Past Surgical History:   Procedure Laterality Date    COLONOSCOPY  04/06/2011    16, due 21    ENDOSCOPY, COLON, DIAGNOSTIC      11, due 16    HX CORONARY ARTERY BYPASS GRAFT  8/22/12    x 4 vessel by [facility].  [facility]    HX HEENT      LASIK    HX PACEMAKER PLACEMENT  1/30/13    NM CARDIAC SURG PROCEDURE UNLIST  2012    x 4 vessels       FAMILY HISTORY:  Family History   Problem Relation Age of Onset    Heart Disease Mother         MI    Heart Disease Father         CAD & PVD    Lung Disease Father     Cancer Father         lung    Diabetes Maternal Grandmother     Heart Disease Other         CAD    Stroke Sister        SOCIAL HISTORY:  Social History     Socioeconomic History    Marital status:      Spouse name: Not on file    Number of children: Not on file    Years of education: Not on file    Highest education level: Not on file   Tobacco Use    Smoking status: Never Smoker    Smokeless tobacco: Never Used   Substance and Sexual Activity    Alcohol use: Yes     Alcohol/week: 0.0 standard drinks     Comment:  VERY RARE    Drug use: No     Social Determinants of Health     Financial Resource Strain:     Difficulty of Paying Living Expenses:    Food Insecurity:     Worried About Running Out of Food in the Last Year:     [address] in the Last Year:    Transportation Needs:     Lack of Transportation (Medical):  Lack of Transportation (Non-Medical):    Physical Activity:     Days of Exercise per Week:     Minutes of Exercise per Session:    Stress:     Feeling of Stress :    Social Connections:     Frequency of Communication with Friends and Family:     Frequency of Social Gatherings with Friends and Family:     Attends Synagogue Services:     Active Member of Clubs or Organizations:     Attends Club or Organization Meetings:     Marital Status:        LABORATORY RESULTS:     Labs Latest Ref Rng & Units 5/22/2021 5/21/2021 5/21/2021 5/20/2021 4/30/2021   WBC 4.1 - 11.1 K/uL 2. 7(L) - 2. 7(L) 3. 7(L) 4.3   RBC 4.10 - 5.70 M/uL 4.62 - 4.22 4.89 5.01   Hemoglobin 12.1 - 17.0 g/dL 10. 8(L) - 9. 9(L) 11. 3(L) 11. 9(L)   Hematocrit 36.6 - 50.3 % 34. 3(L) - 32. 1(L) 37.1 38.2   MCV 80.0 - 99.0 FL 74. 2(L) - 76. 1(L) 75. 9(L) 76. 2(L)   Platelets 070 - 215 K/uL 109(L) - 94(L) 110(L) 112(L)   Lymphocytes 12 - 49 % - - - - 19   Monocytes 5 - 13 % - - - - 10   Eosinophils 0 - 7 % - - - - 1 Basophils 0 - 1 % - - - - 1   Albumin 3.5 - 5.0 g/dL 3. 1(L) - 3. 2(L) 3.8 3.9   Calcium 8.5 - 10.1 MG/DL 8.7 9.1 8.6 9.1 9.7   Glucose 65 - 100 mg/dL 194(H) - 100 125(H) 132(H)   BUN 6 - 20 MG/DL 15 - 16 18 18   Creatinine 0.70 - 1.30 MG/DL 1.15 - 1.07 1.18 1.17   Sodium 136 - 145 mmol/L 137 - 139 137 138   Potassium 3.5 - 5.1 mmol/L 3. 1(L) - 3.6 4.1 4.6   TSH 0.36 - 3.74 uIU/mL - - - 1.97 -   PSA 0.01 - 4.0 ng/mL - - - 2.2 -   LDH 85 - 241 U/L - - 168 - -   Some recent data might be hidden     Lab Results   Component Value Date/Time    TSH 1.97 05/20/2021 04:28 PM    TSH 1.41 02/09/2021 03:05 PM    TSH 1.740 08/14/2018 09:58 AM    TSH 1.710 10/18/2017 12:00 AM       ALLERGY:  Allergies   Allergen Reactions    Pcn [Penicillins] Hives        CURRENT MEDICATIONS:    Current Facility-Administered Medications:     potassium chloride SR (KLOR-CON 10) tablet 40 mEq, 40 mEq, Oral, BID, Judith Sheppard MD, 40 mEq at 05/22/21 0919    magnesium oxide (MAG-OX) tablet 400 mg, 400 mg, Oral, BID, Judith Sheppard MD, 400 mg at 05/22/21 0919    milrinone (PRIMACOR) 20 MG/100 ML D5W infusion, 0.25 mcg/kg/min, IntraVENous, CONTINUOUS, Judith Sheppard MD, Last Rate: 6.8 mL/hr at 05/22/21 0835, 0.25 mcg/kg/min at 05/22/21 0835    hydrALAZINE (APRESOLINE) tablet 10 mg, 10 mg, Oral, TID, Judith Sheppard MD, 10 mg at 05/22/21 0919    lactulose (CHRONULAC) 10 gram/15 mL solution 15 mL, 15 mL, Oral, DAILY, Danica Stewart NP, 15 mL at 05/22/21 0919    aspirin delayed-release tablet 81 mg, 81 mg, Oral, DAILY, Terry, Danica B, NP, 81 mg at 05/22/21 0919    eplerenone (INSPRA) tablet 25 mg, 25 mg, Oral, DAILY, Terry, Danica B, NP, 25 mg at 05/22/21 0919    isosorbide mononitrate ER (IMDUR) tablet 30 mg, 30 mg, Oral, DAILY, Terry, Danica B, NP, 30 mg at 05/22/21 0919    prasugreL (EFFIENT) tablet 10 mg, 10 mg, Oral, DAILY, Terry, Danica B, NP, 10 mg at 05/22/21 0919    rosuvastatin (CRESTOR) tablet 10 mg, 10 mg, Oral, QHS, Terry, Danica B, NP, 10 mg at 05/21/21 2114    tamsulosin (FLOMAX) capsule 0.4 mg, 0.4 mg, Oral, DAILY, Terry, Danica B, NP, 0.4 mg at 05/22/21 0919    sodium chloride (NS) flush 5-40 mL, 5-40 mL, IntraVENous, Q8H, Terry, Danica B, NP, 10 mL at 05/22/21 0600    sodium chloride (NS) flush 5-40 mL, 5-40 mL, IntraVENous, PRN, Terry, Danica B, NP    acetaminophen (TYLENOL) tablet 650 mg, 650 mg, Oral, Q6H PRN, 650 mg at 05/22/21 0412 **OR** acetaminophen (TYLENOL) suppository 650 mg, 650 mg, Rectal, Q6H PRN, Terry, Danica B, NP    polyethylene glycol (MIRALAX) packet 17 g, 17 g, Oral, DAILY PRN, Terry, Danica B, NP    promethazine (PHENERGAN) tablet 12.5 mg, 12.5 mg, Oral, Q6H PRN **OR** ondansetron (ZOFRAN) injection 4 mg, 4 mg, IntraVENous, Q6H PRN, Terry, Danica B, NP    famotidine (PEPCID) tablet 20 mg, 20 mg, Oral, BID, Terry, Danica B, NP, 20 mg at 05/22/21 0919    bumetanide (BUMEX) injection 2 mg, 2 mg, IntraVENous, BID, Nanette Jones MD, 2 mg at 05/22/21 9649    ergocalciferol capsule 50,000 Units, 50,000 Units, Oral, Q7D, Nanette Jones MD, 50,000 Units at 05/21/21 0124    PATIENT CARE TEAM:  Patient Care Team:  Darian Hawkins MD as PCP - General  Darian Hawkins MD as PCP - REHABILITATION [facility]  Todd Murillo MD as Physician (Cardiology)  Gayathri Steiner MD as Physician (Cardiology)  Alejandro Carter MD as Physician (Gastroenterology)  Ezequiel Chandler MD as Physician (Orthopedic Surgery)  Juventino Guerra MD as Physician (Ophthalmology)  [employer], MD as Physician ([address])  Sima Esquivel MD (Cardiology)  Nanette Jones MD (Cardiology)     Thank you for allowing me to participate in this patient's care.     Jovanna Traylor MD PhD  [address], [address]  Phone: [phone]  Fax: [phone]

## 2021-05-23 ENCOUNTER — APPOINTMENT (OUTPATIENT)
Dept: VASCULAR SURGERY | Age: 76
DRG: 287 | End: 2021-05-23
Attending: INTERNAL MEDICINE
Payer: MEDICARE

## 2021-05-23 LAB
ALBUMIN SERPL-MCNC: 3.4 G/DL (ref 3.5–5)
ALBUMIN/GLOB SERPL: 0.9 {RATIO} (ref 1.1–2.2)
ALP SERPL-CCNC: 200 U/L (ref 45–117)
ALT SERPL-CCNC: 14 U/L (ref 12–78)
AMMONIA PLAS-SCNC: 30 UMOL/L
ANION GAP SERPL CALC-SCNC: 5 MMOL/L (ref 5–15)
ANION GAP SERPL CALC-SCNC: 6 MMOL/L (ref 5–15)
AST SERPL-CCNC: 58 U/L (ref 15–37)
BACTERIA SPEC CULT: NORMAL
BACTERIA SPEC CULT: NORMAL
BILIRUB SERPL-MCNC: 1.6 MG/DL (ref 0.2–1)
BNP SERPL-MCNC: 2176 PG/ML
BUN SERPL-MCNC: 14 MG/DL (ref 6–20)
BUN SERPL-MCNC: 15 MG/DL (ref 6–20)
BUN/CREAT SERPL: 12 (ref 12–20)
BUN/CREAT SERPL: 15 (ref 12–20)
CALCIUM SERPL-MCNC: 9.1 MG/DL (ref 8.5–10.1)
CALCIUM SERPL-MCNC: 9.2 MG/DL (ref 8.5–10.1)
CHLORIDE SERPL-SCNC: 101 MMOL/L (ref 97–108)
CHLORIDE SERPL-SCNC: 102 MMOL/L (ref 97–108)
CO2 SERPL-SCNC: 28 MMOL/L (ref 21–32)
CO2 SERPL-SCNC: 28 MMOL/L (ref 21–32)
CREAT SERPL-MCNC: 1.01 MG/DL (ref 0.7–1.3)
CREAT SERPL-MCNC: 1.13 MG/DL (ref 0.7–1.3)
ERYTHROCYTE [DISTWIDTH] IN BLOOD BY AUTOMATED COUNT: 19 % (ref 11.5–14.5)
FOLATE SERPL-MCNC: 15.7 NG/ML (ref 5–21)
G6PD BLD QN: 255 U/10E12 RBC (ref 127–427)
GLOBULIN SER CALC-MCNC: 3.9 G/DL (ref 2–4)
GLUCOSE SERPL-MCNC: 128 MG/DL (ref 65–100)
GLUCOSE SERPL-MCNC: 187 MG/DL (ref 65–100)
HCT VFR BLD AUTO: 40.9 % (ref 36.6–50.3)
HGB BLD-MCNC: 12.8 G/DL (ref 12.1–17)
INTERPRETATION, 117893: NORMAL
LEFT ABI: 1.25
LEFT ANTERIOR TIBIAL: 159 MMHG
LEFT ARM BP: 143 MMHG
LEFT POSTERIOR TIBIAL: 179 MMHG
MAGNESIUM SERPL-MCNC: 2.2 MG/DL (ref 1.6–2.4)
MAGNESIUM SERPL-MCNC: 2.2 MG/DL (ref 1.6–2.4)
MCH RBC QN AUTO: 23.2 PG (ref 26–34)
MCHC RBC AUTO-ENTMCNC: 31.3 G/DL (ref 30–36.5)
MCV RBC AUTO: 74.2 FL (ref 80–99)
NRBC # BLD: 0 K/UL (ref 0–0.01)
NRBC BLD-RTO: 0 PER 100 WBC
PF4 HEPARIN CMPLX AB SER-ACNC: 0.09 OD (ref 0–0.4)
PLATELET # BLD AUTO: 135 K/UL (ref 150–400)
PMV BLD AUTO: ABNORMAL FL (ref 8.9–12.9)
POTASSIUM SERPL-SCNC: 3.1 MMOL/L (ref 3.5–5.1)
POTASSIUM SERPL-SCNC: 4.1 MMOL/L (ref 3.5–5.1)
PROT S ACT/NOR PPP: 98 % (ref 63–140)
PROT SERPL-MCNC: 7.3 G/DL (ref 6.4–8.2)
RBC # BLD AUTO: 5.29 X10E6/UL (ref 4.14–5.8)
RBC # BLD AUTO: 5.51 M/UL (ref 4.1–5.7)
RETICS # AUTO: 0.09 M/UL (ref 0.03–0.1)
RETICS/RBC NFR AUTO: 1.6 % (ref 0.7–2.1)
RIGHT ABI: 1.16
RIGHT ANTERIOR TIBIAL: 166 MMHG
RIGHT ARM BP: 137 MMHG
SCREEN APTT: 33.4 SEC (ref 0–51.9)
SCREEN DRVVT: 39.6 SEC (ref 0–47)
SERVICE CMNT-IMP: NORMAL
SODIUM SERPL-SCNC: 135 MMOL/L (ref 136–145)
SODIUM SERPL-SCNC: 135 MMOL/L (ref 136–145)
VIT B12 SERPL-MCNC: 605 PG/ML (ref 193–986)
WBC # BLD AUTO: 3 K/UL (ref 4.1–11.1)

## 2021-05-23 PROCEDURE — 74011250637 HC RX REV CODE- 250/637: Performed by: INTERNAL MEDICINE

## 2021-05-23 PROCEDURE — 99233 SBSQ HOSP IP/OBS HIGH 50: CPT | Performed by: INTERNAL MEDICINE

## 2021-05-23 PROCEDURE — 80053 COMPREHEN METABOLIC PANEL: CPT

## 2021-05-23 PROCEDURE — 74011250636 HC RX REV CODE- 250/636: Performed by: INTERNAL MEDICINE

## 2021-05-23 PROCEDURE — 83880 ASSAY OF NATRIURETIC PEPTIDE: CPT

## 2021-05-23 PROCEDURE — 74011000250 HC RX REV CODE- 250: Performed by: INTERNAL MEDICINE

## 2021-05-23 PROCEDURE — 74011250637 HC RX REV CODE- 250/637: Performed by: NURSE PRACTITIONER

## 2021-05-23 PROCEDURE — 82746 ASSAY OF FOLIC ACID SERUM: CPT

## 2021-05-23 PROCEDURE — 83020 HEMOGLOBIN ELECTROPHORESIS: CPT

## 2021-05-23 PROCEDURE — 65660000001 HC RM ICU INTERMED STEPDOWN

## 2021-05-23 PROCEDURE — 82140 ASSAY OF AMMONIA: CPT

## 2021-05-23 PROCEDURE — 83735 ASSAY OF MAGNESIUM: CPT

## 2021-05-23 PROCEDURE — 85027 COMPLETE CBC AUTOMATED: CPT

## 2021-05-23 PROCEDURE — 83021 HEMOGLOBIN CHROMOTOGRAPHY: CPT

## 2021-05-23 PROCEDURE — 36415 COLL VENOUS BLD VENIPUNCTURE: CPT

## 2021-05-23 PROCEDURE — 85045 AUTOMATED RETICULOCYTE COUNT: CPT

## 2021-05-23 PROCEDURE — 82607 VITAMIN B-12: CPT

## 2021-05-23 PROCEDURE — 93922 UPR/L XTREMITY ART 2 LEVELS: CPT

## 2021-05-23 RX ORDER — POTASSIUM CHLORIDE 750 MG/1
40 TABLET, FILM COATED, EXTENDED RELEASE ORAL 3 TIMES DAILY
Status: DISCONTINUED | OUTPATIENT
Start: 2021-05-23 | End: 2021-05-25 | Stop reason: HOSPADM

## 2021-05-23 RX ORDER — ISOSORBIDE MONONITRATE 30 MG/1
30 TABLET, EXTENDED RELEASE ORAL EVERY 12 HOURS
Status: DISCONTINUED | OUTPATIENT
Start: 2021-05-23 | End: 2021-05-25 | Stop reason: HOSPADM

## 2021-05-23 RX ORDER — POTASSIUM CHLORIDE 750 MG/1
40 TABLET, FILM COATED, EXTENDED RELEASE ORAL
Status: DISCONTINUED | OUTPATIENT
Start: 2021-05-23 | End: 2021-05-25 | Stop reason: HOSPADM

## 2021-05-23 RX ORDER — LANOLIN ALCOHOL/MO/W.PET/CERES
400 CREAM (GRAM) TOPICAL DAILY
Status: DISCONTINUED | OUTPATIENT
Start: 2021-05-23 | End: 2021-05-25 | Stop reason: HOSPADM

## 2021-05-23 RX ORDER — AMOXICILLIN 250 MG
1 CAPSULE ORAL 2 TIMES DAILY
Status: DISCONTINUED | OUTPATIENT
Start: 2021-05-23 | End: 2021-05-25 | Stop reason: HOSPADM

## 2021-05-23 RX ORDER — HYDRALAZINE HYDROCHLORIDE 25 MG/1
25 TABLET, FILM COATED ORAL 3 TIMES DAILY
Status: DISCONTINUED | OUTPATIENT
Start: 2021-05-23 | End: 2021-05-25

## 2021-05-23 RX ORDER — BISACODYL 5 MG
5 TABLET, DELAYED RELEASE (ENTERIC COATED) ORAL
Status: COMPLETED | OUTPATIENT
Start: 2021-05-23 | End: 2021-05-23

## 2021-05-23 RX ORDER — EPLERENONE 25 MG/1
50 TABLET, FILM COATED ORAL DAILY
Status: DISCONTINUED | OUTPATIENT
Start: 2021-05-23 | End: 2021-05-25 | Stop reason: HOSPADM

## 2021-05-23 RX ADMIN — ISOSORBIDE MONONITRATE 30 MG: 30 TABLET, EXTENDED RELEASE ORAL at 23:37

## 2021-05-23 RX ADMIN — ASPIRIN 81 MG: 81 TABLET, COATED ORAL at 08:53

## 2021-05-23 RX ADMIN — MILRINONE LACTATE IN DEXTROSE 0.25 MCG/KG/MIN: 200 INJECTION, SOLUTION INTRAVENOUS at 07:05

## 2021-05-23 RX ADMIN — BUMETANIDE 2 MG: 0.25 INJECTION INTRAMUSCULAR; INTRAVENOUS at 08:00

## 2021-05-23 RX ADMIN — EPLERENONE 50 MG: 25 TABLET, FILM COATED ORAL at 08:52

## 2021-05-23 RX ADMIN — HYDRALAZINE HYDROCHLORIDE 25 MG: 25 TABLET, FILM COATED ORAL at 18:56

## 2021-05-23 RX ADMIN — TAMSULOSIN HYDROCHLORIDE 0.4 MG: 0.4 CAPSULE ORAL at 08:52

## 2021-05-23 RX ADMIN — BISACODYL 5 MG: 5 TABLET, COATED ORAL at 14:55

## 2021-05-23 RX ADMIN — POTASSIUM CHLORIDE 40 MEQ: 750 TABLET, EXTENDED RELEASE ORAL at 19:05

## 2021-05-23 RX ADMIN — FAMOTIDINE 20 MG: 20 TABLET ORAL at 08:53

## 2021-05-23 RX ADMIN — ROSUVASTATIN 10 MG: 10 TABLET, FILM COATED ORAL at 23:37

## 2021-05-23 RX ADMIN — POLYETHYLENE GLYCOL 3350 17 G: 17 POWDER, FOR SOLUTION ORAL at 18:56

## 2021-05-23 RX ADMIN — Medication 10 ML: at 07:06

## 2021-05-23 RX ADMIN — HYDRALAZINE HYDROCHLORIDE 25 MG: 25 TABLET, FILM COATED ORAL at 08:52

## 2021-05-23 RX ADMIN — POTASSIUM CHLORIDE 40 MEQ: 750 TABLET, EXTENDED RELEASE ORAL at 08:52

## 2021-05-23 RX ADMIN — PRASUGREL HYDROCHLORIDE 10 MG: 10 TABLET, FILM COATED ORAL at 08:52

## 2021-05-23 RX ADMIN — Medication 400 MG: at 08:52

## 2021-05-23 RX ADMIN — DOCUSATE SODIUM 50 MG AND SENNOSIDES 8.6 MG 1 TABLET: 8.6; 5 TABLET, FILM COATED ORAL at 14:55

## 2021-05-23 RX ADMIN — ACETAMINOPHEN 650 MG: 325 TABLET ORAL at 01:54

## 2021-05-23 RX ADMIN — POLYETHYLENE GLYCOL 3350 17 G: 17 POWDER, FOR SOLUTION ORAL at 08:52

## 2021-05-23 RX ADMIN — POTASSIUM CHLORIDE 40 MEQ: 750 TABLET, EXTENDED RELEASE ORAL at 23:37

## 2021-05-23 RX ADMIN — Medication 10 ML: at 23:37

## 2021-05-23 RX ADMIN — BUMETANIDE 2 MG: 0.25 INJECTION INTRAMUSCULAR; INTRAVENOUS at 14:55

## 2021-05-23 RX ADMIN — LACTULOSE 15 ML: 20 SOLUTION ORAL at 08:52

## 2021-05-23 RX ADMIN — DOCUSATE SODIUM 50 MG AND SENNOSIDES 8.6 MG 1 TABLET: 8.6; 5 TABLET, FILM COATED ORAL at 18:56

## 2021-05-23 RX ADMIN — HYDRALAZINE HYDROCHLORIDE 25 MG: 25 TABLET, FILM COATED ORAL at 23:37

## 2021-05-23 RX ADMIN — FAMOTIDINE 20 MG: 20 TABLET ORAL at 18:56

## 2021-05-23 RX ADMIN — ISOSORBIDE MONONITRATE 30 MG: 30 TABLET, EXTENDED RELEASE ORAL at 08:52

## 2021-05-24 ENCOUNTER — DOCUMENTATION ONLY (OUTPATIENT)
Dept: CARDIOLOGY CLINIC | Age: 76
End: 2021-05-24

## 2021-05-24 LAB
ALBUMIN SERPL ELPH-MCNC: 3.7 G/DL (ref 2.9–4.4)
ALBUMIN SERPL-MCNC: 3.4 G/DL (ref 3.5–5)
ALBUMIN/GLOB SERPL: 0.8 {RATIO} (ref 1.1–2.2)
ALBUMIN/GLOB SERPL: 1.1 {RATIO} (ref 0.7–1.7)
ALP SERPL-CCNC: 222 U/L (ref 45–117)
ALPHA1 GLOB SERPL ELPH-MCNC: 0.3 G/DL (ref 0–0.4)
ALPHA2 GLOB SERPL ELPH-MCNC: 0.8 G/DL (ref 0.4–1)
ALT SERPL-CCNC: 18 U/L (ref 12–78)
ANION GAP SERPL CALC-SCNC: 8 MMOL/L (ref 5–15)
AST SERPL-CCNC: 66 U/L (ref 15–37)
B-GLOBULIN SERPL ELPH-MCNC: 1.1 G/DL (ref 0.7–1.3)
BILIRUB SERPL-MCNC: 1.8 MG/DL (ref 0.2–1)
BNP SERPL-MCNC: 3353 PG/ML
BUN SERPL-MCNC: 16 MG/DL (ref 6–20)
BUN/CREAT SERPL: 15 (ref 12–20)
CALCIUM SERPL-MCNC: 9.6 MG/DL (ref 8.5–10.1)
CHLORIDE SERPL-SCNC: 103 MMOL/L (ref 97–108)
CO2 SERPL-SCNC: 24 MMOL/L (ref 21–32)
COTININE UR QL SCN: NEGATIVE NG/ML
CREAT SERPL-MCNC: 1.1 MG/DL (ref 0.7–1.3)
DRUG SCREEN COMMENT:, 753798: NORMAL
ERYTHROCYTE [DISTWIDTH] IN BLOOD BY AUTOMATED COUNT: 19.7 % (ref 11.5–14.5)
GAMMA GLOB SERPL ELPH-MCNC: 1.3 G/DL (ref 0.4–1.8)
GLOBULIN SER CALC-MCNC: 4.1 G/DL (ref 2–4)
GLOBULIN SER-MCNC: 3.5 G/DL (ref 2.2–3.9)
GLUCOSE SERPL-MCNC: 125 MG/DL (ref 65–100)
H PYLORI IGA SER-ACNC: <9 UNITS (ref 0–8.9)
HCT VFR BLD AUTO: 43.7 % (ref 36.6–50.3)
HGB BLD-MCNC: 13.4 G/DL (ref 12.1–17)
HGB FREE PLAS-MCNC: 1.2 MG/DL (ref 0–4.9)
IGA SERPL-MCNC: 255 MG/DL (ref 61–437)
IGG SERPL-MCNC: 1315 MG/DL (ref 603–1613)
IGM SERPL-MCNC: 26 MG/DL (ref 15–143)
INTERPRETATION SERPL IEP-IMP: ABNORMAL
KAPPA LC FREE SER-MCNC: 29.1 MG/L (ref 3.3–19.4)
KAPPA LC FREE/LAMBDA FREE SER: 0.46 {RATIO} (ref 0.26–1.65)
LAMBDA LC FREE SERPL-MCNC: 63.3 MG/L (ref 5.7–26.3)
M PROTEIN SERPL ELPH-MCNC: 0.4 G/DL
MAGNESIUM SERPL-MCNC: 2.1 MG/DL (ref 1.6–2.4)
MCH RBC QN AUTO: 23.2 PG (ref 26–34)
MCHC RBC AUTO-ENTMCNC: 30.7 G/DL (ref 30–36.5)
MCV RBC AUTO: 75.6 FL (ref 80–99)
NRBC # BLD: 0 K/UL (ref 0–0.01)
NRBC BLD-RTO: 0 PER 100 WBC
PLATELET # BLD AUTO: 152 K/UL (ref 150–400)
POTASSIUM SERPL-SCNC: 4.1 MMOL/L (ref 3.5–5.1)
PROT SERPL-MCNC: 7.2 G/DL (ref 6–8.5)
PROT SERPL-MCNC: 7.5 G/DL (ref 6.4–8.2)
RBC # BLD AUTO: 5.78 M/UL (ref 4.1–5.7)
RPR SER QL: NONREACTIVE
SODIUM SERPL-SCNC: 135 MMOL/L (ref 136–145)
WBC # BLD AUTO: 3.6 K/UL (ref 4.1–11.1)

## 2021-05-24 PROCEDURE — 4A023N8 MEASUREMENT OF CARDIAC SAMPLING AND PRESSURE, BILATERAL, PERCUTANEOUS APPROACH: ICD-10-PCS | Performed by: INTERNAL MEDICINE

## 2021-05-24 PROCEDURE — C1892 INTRO/SHEATH,FIXED,PEEL-AWAY: HCPCS | Performed by: INTERNAL MEDICINE

## 2021-05-24 PROCEDURE — 83735 ASSAY OF MAGNESIUM: CPT

## 2021-05-24 PROCEDURE — C1894 INTRO/SHEATH, NON-LASER: HCPCS | Performed by: INTERNAL MEDICINE

## 2021-05-24 PROCEDURE — B2111ZZ FLUOROSCOPY OF MULTIPLE CORONARY ARTERIES USING LOW OSMOLAR CONTRAST: ICD-10-PCS | Performed by: INTERNAL MEDICINE

## 2021-05-24 PROCEDURE — 99153 MOD SED SAME PHYS/QHP EA: CPT | Performed by: INTERNAL MEDICINE

## 2021-05-24 PROCEDURE — 74011250637 HC RX REV CODE- 250/637: Performed by: INTERNAL MEDICINE

## 2021-05-24 PROCEDURE — B2131ZZ FLUOROSCOPY OF MULTIPLE CORONARY ARTERY BYPASS GRAFTS USING LOW OSMOLAR CONTRAST: ICD-10-PCS | Performed by: INTERNAL MEDICINE

## 2021-05-24 PROCEDURE — 83880 ASSAY OF NATRIURETIC PEPTIDE: CPT

## 2021-05-24 PROCEDURE — 93461 R&L HRT ART/VENTRICLE ANGIO: CPT | Performed by: INTERNAL MEDICINE

## 2021-05-24 PROCEDURE — 77030013744: Performed by: INTERNAL MEDICINE

## 2021-05-24 PROCEDURE — C1751 CATH, INF, PER/CENT/MIDLINE: HCPCS | Performed by: INTERNAL MEDICINE

## 2021-05-24 PROCEDURE — 74011000636 HC RX REV CODE- 636: Performed by: INTERNAL MEDICINE

## 2021-05-24 PROCEDURE — B2161ZZ FLUOROSCOPY OF RIGHT AND LEFT HEART USING LOW OSMOLAR CONTRAST: ICD-10-PCS | Performed by: INTERNAL MEDICINE

## 2021-05-24 PROCEDURE — 85027 COMPLETE CBC AUTOMATED: CPT

## 2021-05-24 PROCEDURE — 65660000001 HC RM ICU INTERMED STEPDOWN

## 2021-05-24 PROCEDURE — 99152 MOD SED SAME PHYS/QHP 5/>YRS: CPT | Performed by: INTERNAL MEDICINE

## 2021-05-24 PROCEDURE — 74011000250 HC RX REV CODE- 250: Performed by: INTERNAL MEDICINE

## 2021-05-24 PROCEDURE — 80053 COMPREHEN METABOLIC PANEL: CPT

## 2021-05-24 PROCEDURE — 74011250636 HC RX REV CODE- 250/636: Performed by: INTERNAL MEDICINE

## 2021-05-24 PROCEDURE — 74011250637 HC RX REV CODE- 250/637: Performed by: NURSE PRACTITIONER

## 2021-05-24 PROCEDURE — 36415 COLL VENOUS BLD VENIPUNCTURE: CPT

## 2021-05-24 PROCEDURE — 74011250636 HC RX REV CODE- 250/636: Performed by: NURSE PRACTITIONER

## 2021-05-24 PROCEDURE — 99233 SBSQ HOSP IP/OBS HIGH 50: CPT | Performed by: NURSE PRACTITIONER

## 2021-05-24 PROCEDURE — 77030019569 HC BND COMPR RAD TERU -B: Performed by: INTERNAL MEDICINE

## 2021-05-24 PROCEDURE — 77030016699 HC CATH ANGI DX INFN1 CARD -A: Performed by: INTERNAL MEDICINE

## 2021-05-24 PROCEDURE — 77030004538 HC CATH ANGI DX MP BSC -A: Performed by: INTERNAL MEDICINE

## 2021-05-24 PROCEDURE — C1760 CLOSURE DEV, VASC: HCPCS | Performed by: INTERNAL MEDICINE

## 2021-05-24 PROCEDURE — C1769 GUIDE WIRE: HCPCS | Performed by: INTERNAL MEDICINE

## 2021-05-24 RX ORDER — MILRINONE LACTATE 0.2 MG/ML
0.3 INJECTION, SOLUTION INTRAVENOUS CONTINUOUS
Status: DISCONTINUED | OUTPATIENT
Start: 2021-05-24 | End: 2021-05-25

## 2021-05-24 RX ORDER — SODIUM CHLORIDE 0.9 % (FLUSH) 0.9 %
5-40 SYRINGE (ML) INJECTION EVERY 8 HOURS
Status: DISCONTINUED | OUTPATIENT
Start: 2021-05-24 | End: 2021-05-24 | Stop reason: SDUPTHER

## 2021-05-24 RX ORDER — FENTANYL CITRATE 50 UG/ML
INJECTION, SOLUTION INTRAMUSCULAR; INTRAVENOUS AS NEEDED
Status: DISCONTINUED | OUTPATIENT
Start: 2021-05-24 | End: 2021-05-24 | Stop reason: HOSPADM

## 2021-05-24 RX ORDER — HEPARIN SODIUM 1000 [USP'U]/ML
INJECTION, SOLUTION INTRAVENOUS; SUBCUTANEOUS AS NEEDED
Status: DISCONTINUED | OUTPATIENT
Start: 2021-05-24 | End: 2021-05-24 | Stop reason: HOSPADM

## 2021-05-24 RX ORDER — LIDOCAINE HYDROCHLORIDE 10 MG/ML
INJECTION INFILTRATION; PERINEURAL AS NEEDED
Status: DISCONTINUED | OUTPATIENT
Start: 2021-05-24 | End: 2021-05-24 | Stop reason: HOSPADM

## 2021-05-24 RX ORDER — VERAPAMIL HYDROCHLORIDE 2.5 MG/ML
INJECTION, SOLUTION INTRAVENOUS AS NEEDED
Status: DISCONTINUED | OUTPATIENT
Start: 2021-05-24 | End: 2021-05-24 | Stop reason: HOSPADM

## 2021-05-24 RX ORDER — SODIUM CHLORIDE 0.9 % (FLUSH) 0.9 %
5-40 SYRINGE (ML) INJECTION AS NEEDED
Status: DISCONTINUED | OUTPATIENT
Start: 2021-05-24 | End: 2021-05-24 | Stop reason: SDUPTHER

## 2021-05-24 RX ORDER — MIDAZOLAM HYDROCHLORIDE 1 MG/ML
INJECTION, SOLUTION INTRAMUSCULAR; INTRAVENOUS AS NEEDED
Status: DISCONTINUED | OUTPATIENT
Start: 2021-05-24 | End: 2021-05-24 | Stop reason: HOSPADM

## 2021-05-24 RX ADMIN — TAMSULOSIN HYDROCHLORIDE 0.4 MG: 0.4 CAPSULE ORAL at 11:21

## 2021-05-24 RX ADMIN — LACTULOSE 15 ML: 20 SOLUTION ORAL at 15:27

## 2021-05-24 RX ADMIN — Medication 10 ML: at 21:13

## 2021-05-24 RX ADMIN — POTASSIUM CHLORIDE 40 MEQ: 750 TABLET, EXTENDED RELEASE ORAL at 21:13

## 2021-05-24 RX ADMIN — HYDRALAZINE HYDROCHLORIDE 25 MG: 25 TABLET, FILM COATED ORAL at 15:29

## 2021-05-24 RX ADMIN — POLYETHYLENE GLYCOL 3350 17 G: 17 POWDER, FOR SOLUTION ORAL at 15:30

## 2021-05-24 RX ADMIN — POTASSIUM CHLORIDE 40 MEQ: 750 TABLET, EXTENDED RELEASE ORAL at 11:20

## 2021-05-24 RX ADMIN — ASPIRIN 81 MG: 81 TABLET, COATED ORAL at 11:21

## 2021-05-24 RX ADMIN — POTASSIUM CHLORIDE 40 MEQ: 750 TABLET, EXTENDED RELEASE ORAL at 15:28

## 2021-05-24 RX ADMIN — Medication 400 MG: at 11:21

## 2021-05-24 RX ADMIN — MILRINONE LACTATE IN DEXTROSE 0.25 MCG/KG/MIN: 200 INJECTION, SOLUTION INTRAVENOUS at 23:02

## 2021-05-24 RX ADMIN — MILRINONE LACTATE IN DEXTROSE 0.25 MCG/KG/MIN: 200 INJECTION, SOLUTION INTRAVENOUS at 11:21

## 2021-05-24 RX ADMIN — DOCUSATE SODIUM 50 MG AND SENNOSIDES 8.6 MG 1 TABLET: 8.6; 5 TABLET, FILM COATED ORAL at 17:25

## 2021-05-24 RX ADMIN — DOCUSATE SODIUM 50 MG AND SENNOSIDES 8.6 MG 1 TABLET: 8.6; 5 TABLET, FILM COATED ORAL at 15:28

## 2021-05-24 RX ADMIN — HYDRALAZINE HYDROCHLORIDE 25 MG: 25 TABLET, FILM COATED ORAL at 21:13

## 2021-05-24 RX ADMIN — HYDRALAZINE HYDROCHLORIDE 25 MG: 25 TABLET, FILM COATED ORAL at 11:21

## 2021-05-24 RX ADMIN — Medication 10 ML: at 04:56

## 2021-05-24 RX ADMIN — Medication 10 ML: at 15:33

## 2021-05-24 RX ADMIN — ROSUVASTATIN 10 MG: 10 TABLET, FILM COATED ORAL at 21:13

## 2021-05-24 RX ADMIN — Medication 10 ML: at 15:28

## 2021-05-24 RX ADMIN — ISOSORBIDE MONONITRATE 30 MG: 30 TABLET, EXTENDED RELEASE ORAL at 11:20

## 2021-05-24 RX ADMIN — EPLERENONE 50 MG: 25 TABLET, FILM COATED ORAL at 11:21

## 2021-05-24 RX ADMIN — ISOSORBIDE MONONITRATE 30 MG: 30 TABLET, EXTENDED RELEASE ORAL at 21:13

## 2021-05-24 RX ADMIN — POLYETHYLENE GLYCOL 3350 17 G: 17 POWDER, FOR SOLUTION ORAL at 17:25

## 2021-05-24 RX ADMIN — FAMOTIDINE 20 MG: 20 TABLET ORAL at 17:25

## 2021-05-24 RX ADMIN — FAMOTIDINE 20 MG: 20 TABLET ORAL at 11:21

## 2021-05-24 NOTE — PROGRESS NOTES
TRANSFER - IN REPORT:    Verbal report received from Sanju Standing on  being received from procedure for routine progression of care. Report consisted of patients Situation, Background, Assessment and Recommendations(SBAR). Information from the following report(s) Procedure Summary, MAR and Recent Results was reviewed with the receiving clinician. Opportunity for questions and clarification was provided. Assessment completed upon patients arrival to [address] [place] [address] and care assumed. Cardiac Cath Lab Recovery Arrival Note:    [facility]  arrived to [address]. Patient procedure= left and right heart cath. Patient on cardiac monitor, non-invasive blood pressure, SPO2 monitor. On room air. IV  of normal saline on pump at 100 ml/hr. Patient status doing well without problems. Patient is A&Ox 4. Patient reports no pain. PROCEDURE SITE CHECK:    Procedure site:without any bleeding and hematoma, no pain/discomfort reported at procedure site. Sheaths in place    No change in patient status. Continue to monitor patient and status.

## 2021-05-24 NOTE — PROGRESS NOTES
MECHANICAL CIRCULATORY SUPPORT & TRANSPLANT EVALUATION  Advanced Heart Failure [address]   1945            77 y.o.   male   Ethnicity:   Marital status:   [address] [facility]  [place] [address] [address]     Phone: [phone]   MRN: [id number]          INTERMACS: 3  Time of GDMT: Inotrope Dependent  Height:     Ht Readings from Last 1 Encounters:   05/21/21 6' 1\" (1.854 m)     Weight:   Wt Readings from Last 1 Encounters:   05/24/21 193 lb 2 oz (87.6 kg)     BMI: 25.48kg/m2  Blood type: B+  Referring MD: Bean Harrison MD  Date Consented to Eval: 5/21/21  Date of Presentation: 5/28/21      Cardiac history:  Fatigue at rest  Shortness of breath with minimal exertion  Volume overload  Acute on chronic systolic heart failure  · Stage D, NYHA class IV symptoms  · Ischemic cardiomyopathy, LVEF 20% with LVEDD 6.2  · RV dysfunction, TAPSE 1.22 (prelimnary read)  · TBili 1.5 likely from cardiac congestion  At risk of sudden cardiac death  · Recent cardiac arrest   · S/p ICD (1/2013, [facility] followed by [address] [place] [address])  Coronary artery disease  · S/p multiple interventions  · S/p 4V CABG (8/2012)  · LHC (7/2019) severe stenosis of LIMA to LAD anastomosis site, SVG graft to OM is occluded, SVG graft to RCA 40-50%, LAD occluded proximally, severe proximal LCx, RCA is the long . · PET (6/2019) EF 24% with anterior lateral and inferior reversible defect  Cardiac risk factors  · HTN  · HL  · DM2  · SRUTHI on CPAP  · MIld carotid stenosis  Devices: ICD (1/2013, [facility] followed by [address] [place] [address])  Sternotomies: CABG 2012 Other medical history:   Anemia, microcytic  · Iron deficiency  DVT with filter  Pulmonary nodules      Other surgical history:  As above      Medications:  Bumex 0.5mg  Hydralazine 50mg  Milrinone 0.3 mcg/kg/min  Inspra 50mg  Imdur 30mg  Asa 81mg  Effient 10mg  Crestor 10mg Allergies:   Allergies   Allergen Reactions    n [Penicillins] Hives             Cardiovascular imaging:  Echo 21  ·  LV: Estimated LVEF is 20 - 25%. Normal wall thickness. Mildly dilated left ventricle. Severely and globally reduced systolic function. Severe (grade 4) left ventricular diastolic dysfunction. · LA: Severely dilated left atrium. · RA: Severely dilated right atrium. · MV: Moderate mitral valve regurgitation is present. · TV: Moderate tricuspid valve regurgitation is present. · AO: Mild aortic root dilatation. RV: Pacer/ICD present. LVIDd 6.2 cm Cardiac MRI: not done  PYP test: 21   IMPRESSION: Nuclear Cardiac Amyloid SPECT:  Equivocal for myocardial TTR amyloidosis            LHC: 21  Native Coronaries:  LM - moderate to severe distal disease 50%. % prox occluded (), heavily calcified  LCx: 80% proximal stenosis. OM1 is  (seen filling faintly via collaterals). OM2 99% stenosis  RCA: 100% proximal occluded.     LIMA to LAD: patent, supplies only a diagonal branch (probably D2). The LAD distal to the bifurcation is 100% occluded () and fills via right to left collaterals off the SVG to RCA.     SVG to R-PDA: patent with moderate diffuse irregularities but no obstructive disease in the graft. NST: not done    Viability: not done EK21  Sinus bradycardia with sinus arrhythmia with 1st degree AV block   Anteroseptal infarct , age undetermined   When compared with ECG of 20-MAY-2021 17:18,   No significant change      RHC:21  PA 33/9/17   RA mean 1  PCWP mean 6  [place] CI 1.97  SVR 1748.56 dcs-5  .36 dsc-5    CPEST: not done    6MW:  Date: 21   Pre/Post HR: 74/81   Pre/Post SpO2: 99/99   Distance (meters): 198.12    Abdominal ultrasound: 3/12/21  Cholelithiasis.   Increased heterogenous hepatic ela most likely related to hepatic parenchymal  change.     Ankle-Brachial Index: 21  · No evidence of hemodynamically significant arterial obstruction in the bilateral lower extremities Carotid doppler: 5/22/21  · Consistent with less than 50% stenosis (low end) of the right internal carotid and minimal stenosis of the left internal carotid. · Vertebrals are patent with antegrade flow. Non-cardiac imaging:  CT chest/abd/pelvis: 5/22/21  1. Irregular pulmonary nodule in the left upper lobe measuring 2 cm, suspicious  for primary pulmonary malignancy. 2. Additional 6 mm left upper lobe pulmonary nodule. 3. Severe calcific atherosclerosis of the coronary arteries and abdominal aorta. No aneurysm. 4. Cardiomegaly. 5. No acute abnormality within the chest, abdomen, or pelvis. Head CT: not applicable    CXR: 6/09/27  Cardiomegaly.  No acute cardiopulmonary process   PFT not done   Predicted Measured %   FVC      FEV1      FEV1/FVC      DLCO       EGD: not done     Colonoscopy: not done    Mammogram: not applicable    GYN/Pap smear: not applicable    Dexa scan: not done      Labs:  CBC:  -WBC 4.0  -HGB 12.8  -  CMP:  -Na 134  -Glu 177  -BUN 13  -Cr 8.9  -Ca 8.9  -TBili 1.4  -ALT 22  -AST 72  -Alk phos 219  -Albumin 3.2  ProBNP 2602  ABG 7.53/32.7/108/27.4  REBECCA negative  Blood culture NGTD  CK 63  Cortisol 16.0  CRP 2.6  Digoxin level n/a  ESR 12  Fecal Occult negative  Ferritin 27  G6PD 255  H.pylori Ab <9.0  Heparin Ab 0.094   Hep C Ab negative  HgbA1C 7.0  HIV 1,2 Ab nonreactive  INR 1.3  Iron sat 8  Lactic acid 1.3    Lipid profile 112  Lupus anticoagulant negative Magnesium 2.2  MRSA Swab negative  Plasma Free Hgb 1.2  Prealbumin 22.3  Procalcitonin 0.05  Protein C 83  Protein S 98  Prothrombin gene mutation negative  PSA 2.2  .4  PTT 26.8  RPR nonreactive  LEANDRA not applicable  Troponin I <1.63  TSH 1.97  T3 2.5  T4 1.3  Uric acid 5.5  Vit D level 29.1  Gammopathy Profile:  -IgG level 1315  -M-spike 0.4  -Free Kappa 29.1   -Free lambda 63.3  -Kappa/lambda 0.46     Urine Studies:  Cotinine negative 5/21/21  Ethyl glucuronide not done  Microalbumin 49.8  Pregnancy Test not applicable  UA wnl  UDS negative  Urine Cx n/a  24 hour urine for protein not done    Transplant Labs: not applicable  CMV IgG   EBV IgG   Hep B core Ab - total   Hep B surface Ag/Ab   Hepatitis A Ab-IgG  HSV 1/HSV2 IgG   HTLV1, 2 IgG   Mumps IgG   Quantiferon Gold   Rubella IgG   Rubeola IgG   Toxo IgG   Varicella IgG PRA Class I and II   HLA type               Immunizations:  COVID completed   Hep A   Hep B  Influenza 2020  Pneumonia completed 2015   Td 2019  Zoster 2010       Consultations:  Specialty Date: Results:   Advanced Heart Failure 5/20/2021 · Severe ischemic cardiomyopathy, LVEF 20-25%; stage D, NYHA class IV symptoms; inotrope dependent;    Cardiac surgery     Gastroenterology 5/24/21 Will plan for EGD/colonoscopy as pre-LVAD work up whenever cardiology team feels patient is ready and patient can be prepped   Nutrition 5/21/21 1. Encourage protein with all meals  2. Should be eating at least 75% of all meals  3. Add daily MVI     Palliative care  To be completed outpatient   Dental  to be completed outpatient   As Indicated:     Nephrology  n/a   Hematology 5/24/21 Cytopenias:  Dr. 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  2.  Monoclonal spike:  If he is felt to be a transplant candidate, we would consider bone marrow biopsy.   A smoldering myeloma would likely disqualify him from the list.  If he is not felt to be a transplant candidate, a small monoclonal spike without anemia, hypercalcemia, or renal dysfunction can likely be observed.     I recommend that he keep his office visit and follow up with Dr. Sayda Charlton on 06/12/2021   Infectious diseases   n/a   Pulmonology  5/22/21 Left upper lobe lung mass is subpleural in location and easily accessible with transthoracic needle aspiration biopsy which could be arranged next week- he is on dual platelet therapy with Effient and interventional radiologist may prefer to hold this for a few days before biopsy can be attempted   Endocrinology   n/a   GYN exam   n/a   Urology   n/a   Podiatry   To be completed outpatient   Ophthalmology   To be completed outpatient   Sleep medicine  CPAP   Frailty test   n/a   MOCA   n/a   VAD education  Ongoing   Tx education   Deferred      Psychosocial and financial evaluation:  Date:    :    :  Insurance coverage:  Transplant sites in network:   Recommendations:       Kalina Byrd RN  [address], [address]  Phone: [phone]  Fax: [phone]

## 2021-05-24 NOTE — PROGRESS NOTES
[facility] in [address], [address] [facility]    Met with patient and discussed the I Decide LVAD Decision Making tool. Patient verbalized understanding to review folder. Will check in tomorrow for any questions.

## 2021-05-24 NOTE — PROGRESS NOTES
5: TRANSFER - OUT REPORT:    Verbal report given to Azael Mcgill (name) on [address] [place] [address]  being transferred to cath holding (unit) for ordered procedure       Report consisted of patients Situation, Background, Assessment and   Recommendations(SBAR). Information from the following report(s) SBAR, Kardex, Intake/Output, MAR, Recent Results, Med Rec Status and Cardiac Rhythm NSR was reviewed with the receiving nurse. Lines:   Peripheral IV 05/20/21 Posterior;Right Forearm (Active)   Site Assessment Clean, dry, & intact 05/23/21 2050   Phlebitis Assessment 0 05/23/21 2050   Infiltration Assessment 0 05/23/21 2050   Dressing Status Clean, dry, & intact 05/23/21 2050   Dressing Type Transparent;Tape 05/23/21 2050   Hub Color/Line Status Pink;Flushed;Patent;Capped 05/23/21 2050   Action Taken Open ports on tubing capped 05/22/21 2035   Alcohol Cap Used Yes 05/23/21 2050       Peripheral IV 05/21/21 Left Forearm (Active)   Site Assessment Clean, dry, & intact 05/23/21 2050   Phlebitis Assessment 0 05/23/21 2050   Infiltration Assessment 0 05/23/21 2050   Dressing Status Clean, dry, & intact 05/23/21 2050   Dressing Type Transparent;Tape 05/23/21 2050   Hub Color/Line Status Pink;Patent; Flushed 05/23/21 2050   Action Taken Open ports on tubing capped 05/22/21 2035   Alcohol Cap Used Yes 05/23/21 2050        Opportunity for questions and clarification was provided. Patient transported with:   Monitor  Registered Nurse             4565: Bedside and Verbal shift change report given to KIRK Swanson  (oncoming nurse) by Geeta Chin  (offgoing nurse). Report included the following information SBAR, Kardex, Intake/Output, MAR, Recent Results, Med Rec Status and Cardiac Rhythm NSR.

## 2021-05-24 NOTE — PROGRESS NOTES
0730; Bedside shift change report given to Aaron Macario (oncoming nurse) by Ashleigh (offgoing nurse). Report included the following information SBAR, Kardex, MAR, Recent Results and Cardiac Rhythm SR; 1AVB. Pt currently in cath lab - unable to assess. 1015: TRANSFER - IN REPORT:    Verbal report received from Chaparrita (name) on [address]  being received from [facility] (unit) for routine post - op      Report consisted of patients Situation, Background, Assessment and   Recommendations(SBAR). Information from the following report(s) SBAR, Procedure Summary, MAR and Cardiac Rhythm SR; 1AVB was reviewed with the receiving nurse. Opportunity for questions and clarification was provided. Assessment completed upon patients arrival to unit and care assumed. 1100: Pt arrived to 467. R IJ, fem and L radial site dual checked with Alphonse Counter. All sites CDI with no evidence of hematoma. VSS. L radial cath site TR now at 6 mL. 1130: L Radial TR now at 4 mL. Site CDI. VSS.    1200: L radial TR now at 2 mL. Site CDI. VSS.    1230: L radial TR now at 0 mL. Site CDI. VSS.    1245: L radial TR completely off. 4x4 gauze and tegaderm placed over site. Site CDI. VSS. 1530: Bedside shift change report given to Kathy (oncoming nurse) by Aaron Macario (offgoing nurse). Report included the following information SBAR, Kardex, Procedure Summary, MAR, Recent Results and Cardiac Rhythm SR; 1AVB.

## 2021-05-24 NOTE — CONSULTS
[address] Yudith Mccloud NOTE  [facility] office  [phone] NP in-hospital cell phone M-F until 4:30  After 5pm or on weekends, please call  for physician on call        NAME:  Mario Torres   :   1945   MRN:   [id number]       Referring Physician: Stefanie Morris Date: 2021 2:04 PM    Chief Complaint: LVAD evaluation     History of Present Illness:  Patient is a 77 y.o. who is seen in consultation at the request of Judi Fishman NP for LVAD evaluation. Medical history as listed below includes CAD, CHF. He is getting LVAD evaluation, found to have pulmonary nodule plan for biopsy off Effient x 7 days. He reports chronic constipation. He was having nausea/vomiting PTA while being treated for pneumonia. He has not had any nausea/vomiting and denies any GI symptoms currently. Reports due for screening colonoscopy 2021    I have reviewed the emergency room note, hospital admission note, notes by all other clinicians who have seen the patient during this hospitalization to date. I have reviewed the problem list and the reason for this hospitalization. I have reviewed the allergies and the medications the patient was taking at home prior to this hospitalization. PMH:  Past Medical History:   Diagnosis Date    CAD (coronary artery disease) 97, '13 x 2    MI, code ice in  at [facility]    Calculus of kidney     Colonic polyps     Congestive heart failure, unspecified     Diabetes ([address]     Gastritis     Hypercholesterolemia     Sleep apnea        PSH:  Past Surgical History:   Procedure Laterality Date    COLONOSCOPY  2011    16, due 21    ENDOSCOPY, COLON, DIAGNOSTIC      11, due 16    HX CORONARY ARTERY BYPASS GRAFT  8/22/12    x 4 vessel by S.  James    HX HEENT      LASIK    HX PACEMAKER PLACEMENT  13    ID CARDIAC SURG PROCEDURE UNLIST 2012    x 4 vessels       Allergies: Allergies   Allergen Reactions    Pcn [Penicillins] Hives       Home Medications:  Prior to Admission Medications   Prescriptions Last Dose Informant Patient Reported? Taking?   acetaminophen (TYLENOL EXTRA STRENGTH) 500 mg tablet   Yes No   Sig: Take  by mouth every six (6) hours as needed. Patient not taking: Reported on 2021   aspirin delayed-release 81 mg tablet   Yes No   Sig: Take 81 mg by mouth daily. bumetanide (BUMEX) 2 mg tablet   Yes No   Sig: Take  by mouth. 1/2 tab every other day. carvediloL (COREG) 6.25 mg tablet   No No   Sig: TAKE 1 TABLET TWICE A DAY WITH MEALS   eplerenone (INSPRA) 25 mg tablet   Yes No   Sig: Take 25 mg by mouth daily. ferrous sulfate (Iron) 325 mg (65 mg iron) tablet   Yes No   Sig: Take  by mouth Daily (before breakfast). Patient not taking: Reported on 2021   fosinopriL (MONOPRIL) 10 mg tablet   No No   Sig: TAKE 1 TABLET DAILY   glipiZIDE SR (GLUCOTROL XL) 10 mg CR tablet   No No   Sig: TAKE 1 TABLET DAILY (DUE FOR OFFICE VISIT PLEASE CALL OFFICE TO SCHEDULE)   glucose blood VI test strips (OneTouch Ultra Blue Test Strip) strip   No No   Sig: Use to test blood sugar daily   isosorbide mononitrate ER (IMDUR) 30 mg tablet   Yes No   Sig: Take 30 mg by mouth daily. lancets misc   No No   Sig: Use to test blood sugar daily   metFORMIN (GLUCOPHAGE) 500 mg tablet   No No   Sig: TAKE 2 TABLETS TWICE A DAY WITH MEALS   nitroglycerin (NITROSTAT) 0.4 mg SL tablet   Yes No   Si.4 mg by SubLINGual route every five (5) minutes as needed. Patient not taking: Reported on 2021   omeprazole (PRILOSEC) 20 mg capsule   No No   Sig: Take 1 Cap by mouth daily. Indications: indigestion   ondansetron hcl (ZOFRAN) 4 mg tablet   No No   Sig: Take 1 Tab by mouth every eight (8) hours as needed for Nausea, Vomiting or Nausea or Vomiting.    Patient not taking: Reported on 2021   prasugrel (EFFIENT) 10 mg tablet   No No   Sig: TAKE 1 TABLET DAILY   ranolazine ER (RANEXA) 500 mg SR tablet   No No   Sig: Take 1 Tab by mouth two (2) times a day.  Per cardiology   rosuvastatin (CRESTOR) 10 mg tablet   No No   Sig: TAKE 1 TABLET NIGHTLY   tamsulosin (FLOMAX) 0.4 mg capsule   No No   Sig: TAKE 1 CAPSULE DAILY      Facility-Administered Medications: None       Hospital Medications:  Current Facility-Administered Medications   Medication Dose Route Frequency    sodium chloride (NS) flush 5-40 mL  5-40 mL IntraVENous Q8H    milrinone (PRIMACOR) 20 MG/100 ML D5W infusion  0.25 mcg/kg/min IntraVENous CONTINUOUS    eplerenone (INSPRA) tablet 50 mg  50 mg Oral DAILY    hydrALAZINE (APRESOLINE) tablet 25 mg  25 mg Oral TID    isosorbide mononitrate ER (IMDUR) tablet 30 mg  30 mg Oral Q12H    potassium chloride SR (KLOR-CON 10) tablet 40 mEq  40 mEq Oral TID    potassium chloride SR (KLOR-CON 10) tablet 40 mEq  40 mEq Oral Q6H PRN    magnesium oxide (MAG-OX) tablet 400 mg  400 mg Oral DAILY    senna-docusate (PERICOLACE) 8.6-50 mg per tablet 1 Tablet  1 Tablet Oral BID    polyethylene glycol (MIRALAX) packet 17 g  17 g Oral BID    lactulose (CHRONULAC) 10 gram/15 mL solution 15 mL  15 mL Oral DAILY    aspirin delayed-release tablet 81 mg  81 mg Oral DAILY    [Held by provider] prasugreL (EFFIENT) tablet 10 mg  10 mg Oral DAILY    rosuvastatin (CRESTOR) tablet 10 mg  10 mg Oral QHS    tamsulosin (FLOMAX) capsule 0.4 mg  0.4 mg Oral DAILY    sodium chloride (NS) flush 5-40 mL  5-40 mL IntraVENous Q8H    sodium chloride (NS) flush 5-40 mL  5-40 mL IntraVENous PRN    acetaminophen (TYLENOL) tablet 650 mg  650 mg Oral Q6H PRN    Or    acetaminophen (TYLENOL) suppository 650 mg  650 mg Rectal Q6H PRN    polyethylene glycol (MIRALAX) packet 17 g  17 g Oral DAILY PRN    promethazine (PHENERGAN) tablet 12.5 mg  12.5 mg Oral Q6H PRN    Or    ondansetron (ZOFRAN) injection 4 mg  4 mg IntraVENous Q6H PRN    famotidine (PEPCID) tablet 20 mg  20 mg Oral BID    [Held by provider] bumetanide (BUMEX) injection 2 mg  2 mg IntraVENous BID    ergocalciferol capsule 50,000 Units  50,000 Units Oral Q7D       Social History:  Social History     Tobacco Use    Smoking status: Never Smoker    Smokeless tobacco: Never Used   Substance Use Topics    Alcohol use:  Yes     Alcohol/week: 0.0 standard drinks     Comment:  VERY RARE       Family History:  Family History   Problem Relation Age of Onset    Heart Disease Mother         MI    Heart Disease Father         CAD & PVD    Lung Disease Father     Cancer Father         lung    Diabetes Maternal Grandmother     Heart Disease Other         CAD    Stroke Sister        Review of Systems:  Constitutional: negative fever, negative chills, negative weight loss  Eyes:   negative visual changes  ENT:   negative sore throat, tongue or lip swelling  Respiratory:  negative cough, negative dyspnea  Cards:  negative for chest pain, palpitations, lower extremity edema  GI:   See HPI  :  negative for frequency, dysuria  Integument:  negative for rash and pruritus  Heme:  negative for easy bruising and gum/nose bleeding  Musculoskeletal:+for myalgias, back pain and muscle weakness  Neuro:    negative for headaches, dizziness, vertigo  Psych: negative for feelings of anxiety, depression     Objective:     Patient Vitals for the past 8 hrs:   BP Temp Pulse Resp SpO2   05/24/21 1146 -- 98 °F (36.7 °C) -- -- --   05/24/21 1130 133/69 -- 73 -- 92 %   05/24/21 1115 (!) 144/70 -- 76 -- 95 %   05/24/21 1100 (!) 143/65 97.4 °F (36.3 °C) 73 18 95 %   05/24/21 1030 (!) 141/92 -- 65 -- 96 %   05/24/21 1015 (!) 138/90 -- 77 22 93 %   05/24/21 1000 (!) 140/88 -- 76 19 93 %   05/24/21 0945 (!) 132/90 -- 75 15 95 %   05/24/21 0930 133/80 -- 71 14 95 %   05/24/21 0915 125/76 -- 75 20 93 %   05/24/21 0900 (!) 128/105 -- 74 22 (!) 82 %   05/24/21 0845 128/79 -- 70 15 90 %   05/24/21 0832 (!) 143/96 -- 77 11 --   05/24/21 0822 137/73 -- 88 22 98 %     No intake/output data recorded. 05/22 1901 - 05/24 0700  In: 1055.5 [P.O.:902; I.V.:153.5]  Out: 3175 [Urine:3175]    EXAM:     CONST:  Pleasant male lying in bed, no acute distress   NEURO:  alert and oriented x 3   HEENT: EOMI, no scleral icterus   LUNGS: No increased WOB   CARD:   Regular rate   ABD:  soft, no tenderness, no rebound, no masses, non distended   EXT:  no edema, warm   PSYCH: full, not anxious     Data Review     Recent Labs     05/24/21 0454 05/23/21  0448   WBC 3.6* 3.0*   HGB 13.4 12.8   HCT 43.7 40.9    135*     Recent Labs     05/24/21  0454 05/23/21  1457   * 135*   K 4.1 4.1    102   CO2 24 28   BUN 16 14   CREA 1.10 1.13   * 187*   CA 9.6 9.1     Recent Labs     05/24/21 0454 05/23/21  0448   * 200*   TP 7.5 7.3   ALB 3.4* 3.4*   GLOB 4.1* 3.9     Recent Labs     05/21/21  1818   APTT 26.8     IMPRESSION  1. Irregular pulmonary nodule in the left upper lobe measuring 2 cm, suspicious  for primary pulmonary malignancy. 2. Additional 6 mm left upper lobe pulmonary nodule. 3. Severe calcific atherosclerosis of the coronary arteries and abdominal aorta. No aneurysm. 4. Cardiomegaly. 5. No acute abnormality within the chest, abdomen, or pelvis.      Assessment:     · Elevated LFT's: t bili 1.8, AST 66, ALT 18, ALK phos 222; hepatitis negative, plt normalized, INR 1.3; hepatic steatosis on US in March; CT without acute abnormality - gallstone  · LVAD evaluation  · Cardiomyopathy: EF 20-25%, milrinone  · Effient on hold last dose 5/23/21  · Pulmonary nodule: plan for biopsy off Effient x 7 days  · Pancytopenia: hematology consulted      Patient Active Problem List   Diagnosis Code    Calculus of kidney N20.0    Coronary atherosclerosis of native coronary artery I25.10    Benign neoplasm of colon D12.6    Unspecified sleep apnea G47.30    Reflux esophagitis K21.00    Encounter for long-term (current) use of other medications Z79.899    Insomnia, unspecified G47.00    Cardiac arrest ([facility] [address] I46.9    Hematuria, gross R31.0    BPH without obstruction/lower urinary tract symptoms N40.0    Proteinuria R80.9    CHF, stage C (HCC) I50.9    Mixed hyperlipidemia E78.2    Type 2 diabetes, controlled, with peripheral circulatory disorder (HCC) E11.51    Active advance directive on file Z78.9    Acute on chronic clinical systolic heart failure (HCC) I50.23    Severe protein-calorie malnutrition (HCC) E43     Plan:       · Effient on hold  · Monitor LFT's  · EGD/colonoscopy per Dr. Bell Hassan  · Patient discussed with and will be seen by Dr. Bell Hassan  · Thank you for allowing me to participate in care of [address] [place] [address]     Signed By: Kimmy Al NP     5/24/2021  2:04 PM

## 2021-05-24 NOTE — CONSULTS
Reason for Consult: pancytopenia, undergoing LVAD eval    Did you call or speak to the consulting provider? No    Consult To oncall    Schedule When? TODAY      78 y/o man with a long h/o ischemic cardiomyopathy, admitted with weakness. Seen in April of this year by Dr. Margy Aiken in our group for mild cytopenias. Work-up thus far has included:    Our office:  Quant Igs: normal  SPEP: M-spike of 0.3 g/dL with SIFX showing IgGk  B-12: 507  Folate: 12.4  LDH: 163  Copper: 131  Haptoglobin: normal  Zinc: normal  Ferritin: normal  Retic: normal    Here:   AST: 51-66  Ammonia: 30  Calcium: 9.6  HIV: negative  Echo: LVEF 20-25% with mod TR  HGB electropheresis: pending  CBC today: WBC: 3.6, HGB 13.4,     1. Cytopenias: Dr. 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 2. M-spike: if he is felt to be a transplant candidate, would consider BM biopsy as smoldering myeloma would likely disqualify him from list. If he is not felt to be a transplant candidate, small M-spike without anemia, hypercalcemia, or renal dysfunction can likely be observed. I have recommended that he keep his visit with Dr. Emeli Navarrete on 6/12/21. Thank you for consult.      Mckay Duran MD  Hem/Onc  [phone]

## 2021-05-24 NOTE — PROCEDURES
Cardiac Catheterization Procedure Note   Patient: Peter Decker  MRN: [id number]  SSN: [id number]   YOB: 1945 Age: 77 y.o. Sex: male    Date of Procedure: 5/24/2021   Pre-procedure Diagnosis: Congestive Heart Failure and Coronary Artery Disease  Post-procedure Diagnosis: Congestive Heart Failure and Coronary Artery Disease  Procedure: Left and Right Heart Cath with bypass grafts  :  Dr. Vianca Velasco MD    Assistant(s):  None  Anesthesia: Moderate Sedation   Estimated Blood Loss: Less than 10 mL   Specimens Removed: None  Findings:     Access:   RIJ for RHC, US guided  Lt radial for left coronary and LIMA to LAD  - difficult to advance catheters around the tortuous calcified left subclavian artery. Could only get a 4Fr JL 3.5 to advanced to the LM. Unable to get any right catheters to advance to the aortic root  RCFA for RCA and SVG to RCA, SVG to OM     Findings:  Native Coronaries:  LM - moderate to severe distal disease 50%. % prox occluded (), heavily calcified  LCx: 80% proximal stenosis. OM1 is  (seen filling faintly via collaterals). OM2 99% stenosis  RCA: 100% proximal occluded. LIMA to LAD: patent, supplies only a diagonal branch (probably D2). The LAD distal to the bifurcation is 100% occluded () and fills via right to left collaterals off the SVG to RCA. SVG to R-PDA: patent with moderate diffuse irregularities but no obstructive disease in the graft. No appealing interventional targets. RHC findings: normal left and right heart filling pressures. Full details in final report.     Complications: None   Implants:  None  Signed by:  Vianca Velasco MD  5/24/2021  8:28 AM

## 2021-05-24 NOTE — PROGRESS NOTES
Transitions of Care Plan:  RUR: 12%  Clinical Plan: cardiac cath today; inotrope support; LVAD workup  Consults: AHFC; Pulmonary  Baseline: independent without DME; resides with wife  Disposition: home with infusion therapy  SMAART-E Discharge Patient - note patient will likely discharge home on inotropic support    Reason for Admission:  Acute on chronic clinical systolic heart failure                   RUR Score:   12%                  Plan for utilizing home health:   [facility] - referral sent       PCP: First and Last name:  Jaja Rocha MD     [facility]   Are you a current patient: Yes/No: Yes   Approximate date of last visit: within last 6 months   Can you participate in a virtual visit with your PCP: Yes                    Current Advanced Directive/Advance Care Plan: Full Code      Healthcare Decision Maker:  Wife - Christa Barrier - p: [phone]  Click here to complete [employer] including selection of the Healthcare Decision Maker Relationship (ie \"Primary\")                       Transition of Care Plan:                      CM met with patient and wife at bedside to discuss initial assessment and discharge plan:    Baseline Assessment:  1) ADLs, self-care, orientation: independent without DME; AOx4  2) Social: resides with wife at address on file; autistic son visits every other weekend  3) Pharmacy: [facility] [address] [facility]  4) PCP and Insurance: Confirmed    Disposition Plan:  Home Infusion: [facility] - referral sent for cost/copay for patient to discharge home on 0.375 mcg/kg/min of milrinone. Home Health: Agreeable to [facility] for home health - referral sent    CM will continue to follow. Gudelia Thomas, MPH  Care Manager [facility]  Available via [facility] or      Care Management Interventions  PCP Verified by CM: Yes  Palliative Care Criteria Met (RRAT>21 & CHF Dx)?: No  Mode of Transport at Discharge:  Other (see comment) (Wife, private car)  Transition of Care Consult (CM Consult): [address]: Yes  MyChart Signup: Yes  Discharge Durable Medical Equipment: No  Health Maintenance Reviewed: Yes  Physical Therapy Consult: No  Occupational Therapy Consult: No  Speech Therapy Consult: No  Current Support Network: Lives with Spouse, Own Home  Confirm Follow Up Transport: Family  The Plan for Transition of Care is Related to the Following Treatment Goals : Home Infusion  The Patient and/or Patient Representative was Provided with a Choice of Provider and Agrees with the Discharge Plan?: Yes  Name of the Patient Representative Who was Provided with a Choice of Provider and Agrees with the Discharge Plan: Gustavo Patel Sr.   Freedom of Choice List was Provided with Basic Dialogue that Supports the Patient's Individualized Plan of Care/Goals, Treatment Preferences and Shares the Quality Data Associated with the Providers?: Yes  Discharge Location  Discharge Placement: Home with infusion therapy

## 2021-05-24 NOTE — PROGRESS NOTES
[facility] in [place], [place]  Inpatient Consult Progress Note      Patient name: Ronni Ramirez  Patient : 1945  Patient MRN: [id number]  Consulting MD: Sonja Ramirez MD  Primary general cardiologist:  Dr. Kira Young    Date of service: 21    CHIEF COMPLAINT:  Chronic systolic heart failure     PLAN OF CARE:  · Severe ischemic cardiomyopathy, LVEF 20-25%; stage D, NYHA class IV symptoms; admitted for initiation of palliative inotropic support and in-patient LVAD workup  · Tentative plan will be to discharge home on inotropes to optimize RV function/nutrition/conditioning pre-op if patient decides on LVAD surgery     PLAN:  LHC/RHC today   Resume milrinone 0.25mcg/kg/min- will up titrate tomorrow   Discontinued coreg due to RV dysfunction   Discontinue ACEi in anticipation of cardiac surgery  Cont hydralazine 25 mg TID and imdur 30mg twice daily  Cont current dose of eplerenone 50mg daily  Cont potassium to 40meq TID  Patient is not taking SGLT2 inhibitor; HgA1c 7; consider as OP  Hold diuretics today for normal filling pressures   Not on allopurinol, uric acid wnl  Continue baby ASA   Hold effient (note: may need effient washout prior to surgery and pulmonary nodule biopsy); will ask primary cardiology to help manage antiplatelet regimen  Continue current dose of statin  Discontinued ranexa due to bradycardia and no angina symptoms  Continue daily lactulose for elevated ammonia likely due to passive hepatic congestion and miralax twice daily for constipation  PYP test ordered; genetic testing pending  Need records re: indication for DVT filter?   Genetic testing for cardiomyopathy pending  Check ICD interrogation; records obtainefrom EP cardiology at [address] [place] [address] Drive CPAP therapy; home CPAP in hospital  Provided educational materials; HF education  [employer] decision tool re: LVAD therapy and hospice   Provided advanced care plan forms to be filled out    Ambulate daily     Continue LVAD-DT workup:  · Pulmonary consult appreciated for nodules suspected for primary malignancy; will need biopsy off effient for 7 days  · Hematology consult on today for pancytopenia, labs pending  · Palliative care consultation  · Nutritionist consult  · GI consult for scope next week; EGD/Cscope next week for dysphagia/vomiting and colon cancer screening + comment on liver dysfunction       IMPRESSION:  Fatigue at rest  Shortness of breath with minimal exertion  Volume overload  Acute on chronic systolic heart failure  · Stage D, NYHA class IV symptoms  · Non-ischemic cardiomyopathy, LVEF 20% with LVEDD 6.2  · RV dysfunction, TAPSE 1.22 (prelimnary read)  · TBili 1.5 likely from cardiac congestion  At risk of sudden cardiac death  · Recent cardiac arrest   · S/p ICD (1/2013, [employer] followed by [facility])  Coronary artery disease  · S/p multiple interventions  · S/p 4V CABG (8/2012)  · LHC (7/2019) severe stenosis of LIMA to LAD anastomosis site, SVG graft to OM is occluded, SVG graft to RCA 40-50%, LAD occluded proximally, severe proximal LCx, RCA is the long . · PET (6/2019) EF 24% with anterior lateral and inferior reversible defect  Cardiac risk factors  · HTN  · HL  · DM2  · SRUTHI on CPAP  · MIld carotid stenosis  Anemia, microcytic  · Iron deficiency  DVT with filter  Pulmonary nodules      INTERVAL HISTORY:  Can tell the difference off Milrinone   LHC/RHC this morning  Milrinone held overnight for cath today  PBNP trending up  Adequate diuresis      CARDIAC IMAGING:  Echo (5/20/21)  · LV: Estimated LVEF is 20 - 25%. Normal wall thickness. Mildly dilated left ventricle. Severely and globally reduced systolic function. Severe (grade 4) left ventricular diastolic dysfunction. · LA: Severely dilated left atrium. · RA: Severely dilated right atrium. · MV: Moderate mitral valve regurgitation is present.   · TV: Moderate tricuspid valve regurgitation is present. · AO: Mild aortic root dilatation. · RV: Pacer/ICD present. · LVED 6.2cm  EKG (5/20/21) SB with 1degree AV block, QRS 116ms    [facility] 5/24/21 Native Coronaries: LM - moderate to severe distal disease 50%. % prox occluded (), heavily calcified, LCx: 80% proximal stenosis. OM1 is  (seen filling faintly via collaterals). OM2 99% stenosis  RCA: 100% proximal occluded.   LIMA to LAD: patent, supplies only a diagonal branch (probably D2). The LAD distal to the bifurcation is 100% occluded () and fills via right to left collaterals off the SVG to RCA.   SVG to R-PDA: patent with moderate diffuse irregularities but no obstructive disease in the graft.    No appealing interventional targets. NST as above    ICD interrogation (5/12/21) [employer] single lead, no events, normal device function and good battery     HEMODYNAMICS:  RHC 5/24/21 CI 1.97, PA 33/9/17, RA 1, PCWP 6- off milrinone   CPEST not done  6MW ordered      OTHER IMAGING:  CXR (6/17/21) clear  CT chest not done     HISTORY OF PRESENT ILLNESS:  I had the pleasure of seeing Heron Oconnell Sr in [facility] at [address] [place] [address] in [address] [place] [address] a 77 y. o. male with h/o HTN, HL, DM2, SRUTHI on CPAP, chronic systolic heart failure, stage D, NYHA class IV symptoms, non-ischemic cardiomyopathy, LVEF 20% with LVEDD 6.2, RV dysfunciton, TAPSE 1.22, TBili 1.5 likely from cardiac congestion, recent cardiac arrest s/p ICD (1/2013, [employer] followed by [facility]), coronary artery disease s/p multiple interventions s/p 4V CABG (8/2012), [facility] (7/2019) severe stenosis of LIMA to LAD anastomosis site, SVG graft to OM is occluded, SVG graft to RCA 40-50%, LAD occluded proximally, severe proximal LCx, RCA is the long . PET (6/2019) EF 24% with anterior lateral and inferior reversible defect.  Anemia, microcytic with iron deficiency, DVT with filter.     Patient was referred to [facility] by Dr. Tonya Solano for evaluation of options re: management of advanced heart failure symptoms    PHYSICAL EXAM:  Visit Vitals  /69   Pulse 73   Temp 98 °F (36.7 °C)   Resp 18   Ht 6' 1\" (1.854 m)   Wt 193 lb 2 oz (87.6 kg)   SpO2 92%   BMI 25.48 kg/m²     General: Patient is well developed, well-nourished in no acute distress  HEENT: Normocephalic and atraumatic. No scleral icterus. Pupils are equal, round and reactive to light and accomodation. No conjunctival injection. Oropharynx is clear. Neck: Supple. No evidence of thyroid enlargements or lymphadenopathy. JVD: Cannot be appreciated   Lungs: Breath sounds are equal and clear bilaterally. No wheezes, rhonchi, or rales. Heart: Regular rate and rhythm with normal S1 and S2. No murmurs, gallops or rubs. Abdomen: Soft, no mass or tenderness. No organomegaly or hernia. Bowel sounds present. Genitourinary and rectal: deferred  Extremities: No cyanosis, clubbing, or edema. Neurologic: No focal sensory or motor deficits are noted. Grossly intact. Psychiatric: Awake, alert an doriented x 3. Appropriate mood and affect. Skin: Warm, dry and well perfused. No lesions, nodules or rashes are noted. REVIEW OF SYSTEMS:  General: Denies fever, night sweats. Ear, nose and throat: Denies difficulty hearing, sinus problems, runny nose, post-nasal drip, ringing in ears, mouth sores, loose teeth, ear pain, nosebleeds, sore throate, facial pain or numbess  Cardiovascular: see above in the interval history  Respiratory: Denies cough, wheezing, sputum production, hemoptysis.   Gastrointestinal: Denies heartburn, constipation, intolerance to certain foods, diarrhea, abdominal pain, nausea, vomiting, difficulty swallowing, blood in stool  Kidney and bladder: Denies painful urination, frequent urination, urgency, prostate problems and impotence  Musculoskeletal: Denies joint pain, muscle weakness  Skin and hair: Denies change in existing skin lesions, hair loss or increase, breast changes    PAST MEDICAL HISTORY:  Past Medical History:   Diagnosis Date    CAD (coronary artery disease) '90 '97, '13 x 2    MI, code ice in 2013 at [facility]    Calculus of kidney     Colonic polyps     Congestive heart failure, unspecified     Diabetes ([facility] [address]     Gastritis     Hypercholesterolemia     Sleep apnea        PAST SURGICAL HISTORY:  Past Surgical History:   Procedure Laterality Date    COLONOSCOPY  04/06/2011    16, due 21    ENDOSCOPY, COLON, DIAGNOSTIC      11, due 16    HX CORONARY ARTERY BYPASS GRAFT  8/22/12    x 4 vessel by MISSY LION    HX PACEMAKER PLACEMENT  1/30/13    TX CARDIAC SURG PROCEDURE UNLIST  2012    x 4 vessels       FAMILY HISTORY:  Family History   Problem Relation Age of Onset    Heart Disease Mother         MI    Heart Disease Father         CAD & PVD    Lung Disease Father     Cancer Father         lung    Diabetes Maternal Grandmother     Heart Disease Other         CAD    Stroke Sister        SOCIAL HISTORY:  Social History     Socioeconomic History    Marital status:      Spouse name: Not on file    Number of children: Not on file    Years of education: Not on file    Highest education level: Not on file   Tobacco Use    Smoking status: Never Smoker    Smokeless tobacco: Never Used   Substance and Sexual Activity    Alcohol use: Yes     Alcohol/week: 0.0 standard drinks     Comment:  VERY RARE    Drug use: No     Social Determinants of Health     Financial Resource Strain:     Difficulty of Paying Living Expenses:    Food Insecurity:     Worried About Running Out of Food in the Last Year:     [address] in the Last Year:    Transportation Needs:     Lack of Transportation (Medical):      Lack of Transportation (Non-Medical):    Physical Activity:     Days of Exercise per Week:     Minutes of Exercise per Session:    Stress:     Feeling of Stress :    Social Connections:     Frequency of Communication with Friends and Family:     Frequency of Social Gatherings with Friends and Family:     Attends [employer] Services:     Active Member of Clubs or Organizations:     Attends Club or Organization Meetings:     Marital Status:        LABORATORY RESULTS:     Labs Latest Ref Rng & Units 5/24/2021 5/23/2021 5/23/2021 5/22/2021 5/22/2021 5/21/2021 5/21/2021   WBC 4.1 - 11.1 K/uL 3.6(L) - 3. 0(L) - 2. 7(L) - 2. 7(L)   RBC 4.10 - 5.70 M/uL 5.78(H) - 5.51 - 4.62 - 4.22   Hemoglobin 12.1 - 17.0 g/dL 13.4 - 12.8 - 10. 8(L) - 9. 9(L)   Hematocrit 36.6 - 50.3 % 43.7 - 40.9 - 34. 3(L) - 32. 1(L)   MCV 80.0 - 99.0 FL 75. 6(L) - 74. 2(L) - 74. 2(L) - 76. 1(L)   Platelets 821 - 268 K/uL 152 - 135(L) - 109(L) - 94(L)   Lymphocytes 12 - 49 % - - - - - - -   Monocytes 5 - 13 % - - - - - - -   Eosinophils 0 - 7 % - - - - - - -   Basophils 0 - 1 % - - - - - - -   Albumin 3.5 - 5.0 g/dL 3.4(L) - 3. 4(L) - 3. 1(L) - 3. 2(L)   Calcium 8.5 - 10.1 MG/DL 9.6 9.1 9.2 9.2 8.7 9.1 8.6   Glucose 65 - 100 mg/dL 125(H) 187(H) 128(H) 188(H) 194(H) - 100   BUN 6 - 20 MG/DL 16 14 15 15 15 - 16   Creatinine 0.70 - 1.30 MG/DL 1.10 1.13 1.01 1.23 1.15 - 1.07   Sodium 136 - 145 mmol/L 135(L) 135(L) 135(L) 138 137 - 139   Potassium 3.5 - 5.1 mmol/L 4.1 4.1 3.1(L) 3.7 3. 1(L) - 3.6   TSH 0.36 - 3.74 uIU/mL - - - - - - -   PSA 0.01 - 4.0 ng/mL - - - - - - -   LDH 85 - 241 U/L - - - - - - 168   Some recent data might be hidden     Lab Results   Component Value Date/Time    TSH 1.97 05/20/2021 04:28 PM    TSH 1.41 02/09/2021 03:05 PM    TSH 1.740 08/14/2018 09:58 AM    TSH 1.710 10/18/2017 12:00 AM       ALLERGY:  Allergies   Allergen Reactions    Pcn [Penicillins] Hives        CURRENT MEDICATIONS:    Current Facility-Administered Medications:     sodium chloride (NS) flush 5-40 mL, 5-40 mL, IntraVENous, Q8H, Afshin Bah MD    milrinone (PRIMACOR) 20 MG/100 ML D5W infusion, 0.25 mcg/kg/min, IntraVENous, CONTINUOUS, Danica Stewart NP, Last Rate: 6.6 mL/hr at 05/24/21 1121, 0.25 mcg/kg/min at 05/24/21 1121    eplerenone (INSPRA) tablet 50 mg, 50 mg, Oral, DAILY, Jaylyn Centeno MD, 50 mg at 05/24/21 1121    hydrALAZINE (APRESOLINE) tablet 25 mg, 25 mg, Oral, TID, Jaylyn Centeno, MD, 25 mg at 05/24/21 1121    isosorbide mononitrate ER (IMDUR) tablet 30 mg, 30 mg, Oral, Q12H, Jaylyn Centeno MD, 30 mg at 05/24/21 1120    potassium chloride SR (KLOR-CON 10) tablet 40 mEq, 40 mEq, Oral, TID, Jaylyn Centeno MD, 40 mEq at 05/24/21 1120    potassium chloride SR (KLOR-CON 10) tablet 40 mEq, 40 mEq, Oral, Q6H PRN, Jaylyn Centeno MD    magnesium oxide (MAG-OX) tablet 400 mg, 400 mg, Oral, DAILY, Jaylyn Centeno MD, 400 mg at 05/24/21 1121    senna-docusate (PERICOLACE) 8.6-50 mg per tablet 1 Tablet, 1 Tablet, Oral, BID, Jaylyn Centeno MD, 1 Tablet at 05/23/21 1856    polyethylene glycol (MIRALAX) packet 17 g, 17 g, Oral, BID, Jaylyn Cetneno MD, 17 g at 05/23/21 1856    lactulose (CHRONULAC) 10 gram/15 mL solution 15 mL, 15 mL, Oral, DAILY, Terry, Danica B, NP, 15 mL at 05/23/21 1325    aspirin delayed-release tablet 81 mg, 81 mg, Oral, DAILY, Terry, Danica B, NP, 81 mg at 05/24/21 1121    [Held by provider] prasugreL (EFFIENT) tablet 10 mg, 10 mg, Oral, DAILY, Terry, Danica B, NP, 10 mg at 05/23/21 3282    rosuvastatin (CRESTOR) tablet 10 mg, 10 mg, Oral, QHS, Terry, Danica B, NP, 10 mg at 05/23/21 2337    tamsulosin (FLOMAX) capsule 0.4 mg, 0.4 mg, Oral, DAILY, Terry, Danica B, NP, 0.4 mg at 05/24/21 1121    sodium chloride (NS) flush 5-40 mL, 5-40 mL, IntraVENous, Q8H, Terry, Danica B, NP, 10 mL at 05/24/21 0456    sodium chloride (NS) flush 5-40 mL, 5-40 mL, IntraVENous, PRN, Terry, Danica B, NP    acetaminophen (TYLENOL) tablet 650 mg, 650 mg, Oral, Q6H PRN, 650 mg at 05/23/21 0154 **OR** acetaminophen (TYLENOL) suppository 650 mg, 650 mg, Rectal, Q6H PRN, Terry, Danica B, NP    polyethylene glycol (MIRALAX) packet 17 g, 17 g, Oral, DAILY PRN, Terry, Danica B, NP    promethazine (PHENERGAN) tablet 12.5 mg, 12.5 mg, Oral, Q6H PRN **OR** ondansetron (ZOFRAN) injection 4 mg, 4 mg, IntraVENous, Q6H PRN, Terry, Danica B, NP    famotidine (PEPCID) tablet 20 mg, 20 mg, Oral, BID, Terry, Danica B, NP, 20 mg at 05/24/21 1121    [Held by provider] bumetanide (BUMEX) injection 2 mg, 2 mg, IntraVENous, BID, Berna Coronado MD, 2 mg at 05/23/21 1455    ergocalciferol capsule 50,000 Units, 50,000 Units, Oral, Q7D, Berna Coronado MD, 50,000 Units at 05/21/21 0124    PATIENT CARE TEAM:  Patient Care Team:  Simmie Halsted, MD as PCP - General  Simmie Halsted, MD as PCP - [facility] Provider  Viki Mascorro MD as Physician (Cardiology)  Maximino Crouch MD as Physician (Cardiology)  Abby Kumar MD as Physician (Gastroenterology)  Sam Kohli MD as Physician (Orthopedic Surgery)  Marybel Rebolledo MD as Physician (Ophthalmology)  Thurston Huddle Wilms, MD as Physician ([address])  Horace Bailey MD (Cardiology)  Berna Coronado MD (Cardiology)     Thank you for allowing me to participate in this patient's care.     Sanjuana Devi NP  [address] [place] [address], [address]  Phone: [phone]

## 2021-05-24 NOTE — CONSULTS
Ravinder Milian    Name:  Diana Hernandez  MR#:  [id number]  :  1945  ACCOUNT #:  [de-identified]  DATE OF SERVICE:  2021      HEMATOLOGY-ONCOLOGY CONSULTATION    REASON FOR ADMISSION:  Fatigue. REASON FOR CONSULTATION:  Mild cytopenia. HISTORY OF PRESENT ILLNESS:  The patient is a 28-year-old man who had his first heart attack at age 41. Since then, he has had multiple interventions including a four-vessel coronary artery bypass graft in  along with an AICD placement and multiple other cardiac catheterizations, the last apparently was yesterday. Recent echocardiogram done this admission revealed his EF approximated at 20% to 25% with at least moderate tricuspid regurgitation. In 2021, he was seen by Dr. Dereck Gaspar in our group for mild cytopenias, and she raised the concern that he may have benign neutropenia given the long history of mild leukopenia in his medical record. At our office, she did additional testing that included ferritin which was normal, reticulocyte count which was normal, zinc which was normal, haptoglobin which was normal, copper which was normal, LDH which was normal, B12 which was normal, folic acid which was normal; however, his SPEP did show a 0.3 g/dL M-spike, serum IFX showed this to be IgG Kappa. Additional studies done during this hospitalization including HIV which was negative and hemoglobin electrophoresis which has been sent but is still pending. The patient denies any enlarged lymph nodes in his neck, underarm, or groin. Denies any shortness of breath at rest.    PAST MEDICAL HISTORY:  1. Type 2 diabetes. 2.  Obstructive sleep apnea. 3.  Hypertension. 4.  Ischemic cardiomyopathy, as above. 5.  Status post AICD. ALLERGIES:  PENICILLIN. CURRENT MEDICATIONS IN THE HOSPITAL:  1. Aspirin 81 mg daily. 2.  Bumex 2 mg IV twice daily. 3.  Inspra 50 mg once a daily. 4.  Pepcid 20 mg twice daily.   5.  Imdur 30 mg p.o. q.12.  6.  Chronulac once daily. 7.  Magnesium oxide 400 mg once daily. 8.  Crestor 10 mg daily. 9.  Flomax 0.4 mg daily. SOCIAL HISTORY:  He is a retired iraheta. He is . His wife is in the room. FAMILY HISTORY:  Reviewed and noncontributory. REVIEW OF SYSTEMS:  GENERAL:  No fevers or chills. HEENT:  No auditory or visual change. LYMPHATIC:  No lumps or bumps in the neck, underarm, or groin. CARDIOVASCULAR:  As above. PULMONARY:  As above. GASTROINTESTINAL:  No abdominal pain, diarrhea, or constipation. GENITOURINARY:  No dysuria or incontinence. SKIN:  No rash or changing mole. MUSCULOSKELETAL:  No red or swollen joints. NEUROLOGIC:  No irritability or syncope. PHYSICAL EXAMINATION:  VITAL SIGNS:  Blood pressure 133/69, pulse 73, temperature 98.0, saturating 95% on room air. GENERAL:  Pleasant, in no acute distress. HEENT:  Sclerae anicteric. Oropharynx is clear. NECK:  Supple. No lymphadenopathy or thyromegaly. HEART:  Regular rate and rhythm without murmur, rub, or gallop. LUNGS:  Clear to auscultation bilaterally. ABDOMEN:  Nontender, nondistended. Normoactive bowel sounds. No hepatosplenomegaly. EXTREMITIES:  No clubbing, cyanosis, or edema. PSYCHIATRIC:  Normal mood and affect. Alert and oriented x3. ASSESSMENT AND PLAN:  A 20-year-old man with a long history of ischemic cardiomyopathy, admitted with weakness. Seen in 04/2021 by Dr. Rosalia Smith in our group for mild cytopenias. Workup showed normal iron studies, a small monoclonal spike. 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 1.  Cytopenias:  Dr. Mady Martinez raised the concern for benign ethnic neutropenia, which is consistent with his long history of mild leukopenia in the [address] [place] [address] with a normal differential.  2.  Monoclonal spike:  If he is felt to be a transplant candidate, we would consider bone marrow biopsy.   A smoldering myeloma would likely disqualify him from the list.  If he is not felt to be a transplant candidate, a small monoclonal spike without anemia, hypercalcemia, or renal dysfunction can likely be observed. I recommend that he keep his office visit and follow up with Dr. Trace Ceabllos on 06/12/2021. Thank you for the consultation. Jaleel Santamaria MD      [id number]  D:  05/24/2021 14:36  T:  05/24/2021 18:23  JOB #:  [id number]  CC:   Romina Lopez MD       [place] Husam Bosch MD

## 2021-05-24 NOTE — PROGRESS NOTES
Transfer to [facility] RR from Procedure Area    Verbal report given to rt ganga on [facility] being transferred to Cardiac Cath Lab RR for routine progression of care   Patient is post l/rhc procedure. Patient stable upon transfer to RR. Report consisted of patients Situation, Background, Assessment and   Recommendations(SBAR). Information from the following report(s) SBAR was reviewed with the receiving nurse. Opportunity for questions and clarification was provided. Patient medicated during procedure with orders obtained and verified by Dr. Suzanne Leonard. Refer to patient PROCEDURE REPORT for vital signs, assessment, status, and response during procedure.

## 2021-05-24 NOTE — PROGRESS NOTES
TRANSFER - IN REPORT:    Verbal report received from Ashleigh, RN(name) [facility](unit) for ordered procedure      Report consisted of patients Situation, Background, Assessment and   Recommendations(SBAR). Information from the following report(s) SBAR was reviewed with the receiving nurse. Opportunity for questions and clarification was provided. Assessment completed upon patients arrival to unit and care assumed.

## 2021-05-24 NOTE — PROGRESS NOTES
PCCM:    Vss, on room air    probnp [id number]    Plan:   Down getting heart cath   RAVEN 2cm lesion.  Patient will need to be off effient for 7 days before lung bx can take place   This could be an outpatient procedure if the primary wishes patient to be discharged soon   Discussed with CT and attending    Harsh Nina PA-C

## 2021-05-24 NOTE — PROGRESS NOTES
Cardiac Cath Lab Procedure Area Arrival Note:    [facility] Dangelo Sr arrived to Cardiac Cath Lab, Procedure Area. Patient identifiers verified with NAME and DATE OF BIRTH. Procedure verified with patient. Consent forms verified. Allergies verified. Patient informed of procedure and plan of care. Questions answered with review. Patient voiced understanding of procedure and plan of care. Patient on cardiac monitor, non-invasive blood pressure, SPO2 monitor. On  ra. IV of ns on pump at 25 ml/hr. Patient status doing well without problems. Patient is A&Ox 4. Patient reports no cp or sob. Patient medicated during procedure with orders obtained and verified by Dr. Yaz Whalen. Refer to patients Cardiac Cath Lab PROCEDURE REPORT for vital signs, assessment, status, and response during procedure, printed at end of case. Printed report on chart or scanned into chart.

## 2021-05-25 ENCOUNTER — APPOINTMENT (OUTPATIENT)
Dept: NUCLEAR MEDICINE | Age: 76
DRG: 287 | End: 2021-05-25
Attending: NURSE PRACTITIONER
Payer: MEDICARE

## 2021-05-25 ENCOUNTER — TELEPHONE (OUTPATIENT)
Dept: CARDIOLOGY CLINIC | Age: 76
End: 2021-05-25

## 2021-05-25 VITALS
DIASTOLIC BLOOD PRESSURE: 63 MMHG | HEIGHT: 73 IN | HEART RATE: 84 BPM | RESPIRATION RATE: 20 BRPM | BODY MASS INDEX: 25.89 KG/M2 | TEMPERATURE: 97.6 F | OXYGEN SATURATION: 98 % | SYSTOLIC BLOOD PRESSURE: 119 MMHG | WEIGHT: 195.33 LBS

## 2021-05-25 DIAGNOSIS — R91.8 MULTIPLE LUNG NODULES ON CT: Primary | ICD-10-CM

## 2021-05-25 DIAGNOSIS — D61.818 PANCYTOPENIA (HCC): ICD-10-CM

## 2021-05-25 LAB
ALBUMIN SERPL-MCNC: 3.2 G/DL (ref 3.5–5)
ALBUMIN/GLOB SERPL: 0.8 {RATIO} (ref 1.1–2.2)
ALP SERPL-CCNC: 219 U/L (ref 45–117)
ALT SERPL-CCNC: 22 U/L (ref 12–78)
ANION GAP SERPL CALC-SCNC: 6 MMOL/L (ref 5–15)
AST SERPL-CCNC: 72 U/L (ref 15–37)
BILIRUB SERPL-MCNC: 1.4 MG/DL (ref 0.2–1)
BNP SERPL-MCNC: 2602 PG/ML
BUN SERPL-MCNC: 13 MG/DL (ref 6–20)
BUN/CREAT SERPL: 13 (ref 12–20)
CALCIUM SERPL-MCNC: 8.9 MG/DL (ref 8.5–10.1)
CHLORIDE SERPL-SCNC: 104 MMOL/L (ref 97–108)
CO2 SERPL-SCNC: 24 MMOL/L (ref 21–32)
CREAT SERPL-MCNC: 0.97 MG/DL (ref 0.7–1.3)
ERYTHROCYTE [DISTWIDTH] IN BLOOD BY AUTOMATED COUNT: 19.7 % (ref 11.5–14.5)
GLOBULIN SER CALC-MCNC: 4.1 G/DL (ref 2–4)
GLUCOSE SERPL-MCNC: 177 MG/DL (ref 65–100)
HCT VFR BLD AUTO: 41.4 % (ref 36.6–50.3)
HEMOCCULT STL QL: NEGATIVE
HGB BLD-MCNC: 12.8 G/DL (ref 12.1–17)
MAGNESIUM SERPL-MCNC: 2.2 MG/DL (ref 1.6–2.4)
MCH RBC QN AUTO: 23.2 PG (ref 26–34)
MCHC RBC AUTO-ENTMCNC: 30.9 G/DL (ref 30–36.5)
MCV RBC AUTO: 75 FL (ref 80–99)
NRBC # BLD: 0 K/UL (ref 0–0.01)
NRBC BLD-RTO: 0 PER 100 WBC
PLATELET # BLD AUTO: 158 K/UL (ref 150–400)
POTASSIUM SERPL-SCNC: 4.7 MMOL/L (ref 3.5–5.1)
PROT SERPL-MCNC: 7.3 G/DL (ref 6.4–8.2)
RBC # BLD AUTO: 5.52 M/UL (ref 4.1–5.7)
SODIUM SERPL-SCNC: 134 MMOL/L (ref 136–145)
STRESS TARGET HR: 145 BPM
WBC # BLD AUTO: 4 K/UL (ref 4.1–11.1)

## 2021-05-25 PROCEDURE — 82272 OCCULT BLD FECES 1-3 TESTS: CPT

## 2021-05-25 PROCEDURE — 85027 COMPLETE CBC AUTOMATED: CPT

## 2021-05-25 PROCEDURE — 76937 US GUIDE VASCULAR ACCESS: CPT

## 2021-05-25 PROCEDURE — 02HV33Z INSERTION OF INFUSION DEVICE INTO SUPERIOR VENA CAVA, PERCUTANEOUS APPROACH: ICD-10-PCS | Performed by: INTERNAL MEDICINE

## 2021-05-25 PROCEDURE — C1751 CATH, INF, PER/CENT/MIDLINE: HCPCS

## 2021-05-25 PROCEDURE — A9538 TC99M PYROPHOSPHATE: HCPCS

## 2021-05-25 PROCEDURE — 74011250637 HC RX REV CODE- 250/637: Performed by: INTERNAL MEDICINE

## 2021-05-25 PROCEDURE — 80053 COMPREHEN METABOLIC PANEL: CPT

## 2021-05-25 PROCEDURE — 74011250637 HC RX REV CODE- 250/637: Performed by: NURSE PRACTITIONER

## 2021-05-25 PROCEDURE — 36573 INSJ PICC RS&I 5 YR+: CPT | Performed by: NURSE PRACTITIONER

## 2021-05-25 PROCEDURE — 36415 COLL VENOUS BLD VENIPUNCTURE: CPT

## 2021-05-25 PROCEDURE — 77030020365 HC SOL INJ SOD CL 0.9% 50ML

## 2021-05-25 PROCEDURE — 74011250636 HC RX REV CODE- 250/636: Performed by: NURSE PRACTITIONER

## 2021-05-25 PROCEDURE — 99233 SBSQ HOSP IP/OBS HIGH 50: CPT | Performed by: NURSE PRACTITIONER

## 2021-05-25 PROCEDURE — 83880 ASSAY OF NATRIURETIC PEPTIDE: CPT

## 2021-05-25 PROCEDURE — 83735 ASSAY OF MAGNESIUM: CPT

## 2021-05-25 RX ORDER — ERGOCALCIFEROL 1.25 MG/1
50000 CAPSULE ORAL
Qty: 11 CAPSULE | Refills: 0 | Status: SHIPPED | OUTPATIENT
Start: 2021-05-28 | End: 2021-08-17

## 2021-05-25 RX ORDER — LANOLIN ALCOHOL/MO/W.PET/CERES
400 CREAM (GRAM) TOPICAL DAILY
Qty: 90 TABLET | Refills: 1 | Status: SHIPPED | OUTPATIENT
Start: 2021-05-26 | End: 2021-11-12 | Stop reason: SDUPTHER

## 2021-05-25 RX ORDER — BUMETANIDE 1 MG/1
0.5 TABLET ORAL DAILY
Status: DISCONTINUED | OUTPATIENT
Start: 2021-05-26 | End: 2021-05-25 | Stop reason: HOSPADM

## 2021-05-25 RX ORDER — POTASSIUM CHLORIDE 1500 MG/1
40 TABLET, FILM COATED, EXTENDED RELEASE ORAL 2 TIMES DAILY
Qty: 240 TABLET | Refills: 1 | Status: SHIPPED | OUTPATIENT
Start: 2021-05-25 | End: 2022-01-11 | Stop reason: ALTCHOICE

## 2021-05-25 RX ORDER — EPLERENONE 50 MG/1
50 TABLET, FILM COATED ORAL DAILY
Qty: 90 TABLET | Refills: 1 | Status: SHIPPED | OUTPATIENT
Start: 2021-05-26 | End: 2022-01-11 | Stop reason: SDUPTHER

## 2021-05-25 RX ORDER — MILRINONE LACTATE 0.2 MG/ML
0.3 INJECTION, SOLUTION INTRAVENOUS CONTINUOUS
Status: DISCONTINUED | OUTPATIENT
Start: 2021-05-25 | End: 2021-05-25 | Stop reason: HOSPADM

## 2021-05-25 RX ORDER — HYDRALAZINE HYDROCHLORIDE 50 MG/1
50 TABLET, FILM COATED ORAL 3 TIMES DAILY
Status: DISCONTINUED | OUTPATIENT
Start: 2021-05-25 | End: 2021-05-25 | Stop reason: HOSPADM

## 2021-05-25 RX ORDER — MILRINONE LACTATE 0.2 MG/ML
0.3 INJECTION, SOLUTION INTRAVENOUS CONTINUOUS
Qty: 100 ML | Refills: 0 | Status: SHIPPED
Start: 2021-05-25 | End: 2022-02-05

## 2021-05-25 RX ORDER — ISOSORBIDE MONONITRATE 30 MG/1
30 TABLET, EXTENDED RELEASE ORAL EVERY 12 HOURS
Qty: 180 TABLET | Refills: 1 | Status: SHIPPED | OUTPATIENT
Start: 2021-05-25 | End: 2022-01-04 | Stop reason: SDUPTHER

## 2021-05-25 RX ORDER — HYDRALAZINE HYDROCHLORIDE 50 MG/1
50 TABLET, FILM COATED ORAL 3 TIMES DAILY
Qty: 180 TABLET | Refills: 2 | Status: SHIPPED | OUTPATIENT
Start: 2021-05-25 | End: 2021-12-17

## 2021-05-25 RX ADMIN — Medication 400 MG: at 09:57

## 2021-05-25 RX ADMIN — HYDRALAZINE HYDROCHLORIDE 25 MG: 25 TABLET, FILM COATED ORAL at 09:57

## 2021-05-25 RX ADMIN — POTASSIUM CHLORIDE 40 MEQ: 750 TABLET, EXTENDED RELEASE ORAL at 09:57

## 2021-05-25 RX ADMIN — DOCUSATE SODIUM 50 MG AND SENNOSIDES 8.6 MG 1 TABLET: 8.6; 5 TABLET, FILM COATED ORAL at 09:58

## 2021-05-25 RX ADMIN — POLYETHYLENE GLYCOL 3350 17 G: 17 POWDER, FOR SOLUTION ORAL at 09:58

## 2021-05-25 RX ADMIN — Medication 10 ML: at 15:14

## 2021-05-25 RX ADMIN — LACTULOSE 15 ML: 20 SOLUTION ORAL at 09:58

## 2021-05-25 RX ADMIN — MILRINONE LACTATE IN DEXTROSE 0.3 MCG/KG/MIN: 200 INJECTION, SOLUTION INTRAVENOUS at 15:11

## 2021-05-25 RX ADMIN — ISOSORBIDE MONONITRATE 30 MG: 30 TABLET, EXTENDED RELEASE ORAL at 09:57

## 2021-05-25 RX ADMIN — ASPIRIN 81 MG: 81 TABLET, COATED ORAL at 09:57

## 2021-05-25 RX ADMIN — FAMOTIDINE 20 MG: 20 TABLET ORAL at 19:33

## 2021-05-25 RX ADMIN — Medication 10 ML: at 07:08

## 2021-05-25 RX ADMIN — EPLERENONE 50 MG: 25 TABLET, FILM COATED ORAL at 09:58

## 2021-05-25 RX ADMIN — TAMSULOSIN HYDROCHLORIDE 0.4 MG: 0.4 CAPSULE ORAL at 09:58

## 2021-05-25 RX ADMIN — POTASSIUM CHLORIDE 40 MEQ: 750 TABLET, EXTENDED RELEASE ORAL at 19:33

## 2021-05-25 RX ADMIN — FAMOTIDINE 20 MG: 20 TABLET ORAL at 09:57

## 2021-05-25 RX ADMIN — HYDRALAZINE HYDROCHLORIDE 50 MG: 50 TABLET, FILM COATED ORAL at 19:33

## 2021-05-25 NOTE — PROGRESS NOTES
[facility] in [place], [place]  Inpatient Consult Progress Note      Patient name: Gabriela Cummins  Patient : 1945  Patient MRN: [id number]  Consulting MD: Macario Adkins MD  Primary general cardiologist:  Dr. Antonia Johnson    Date of service: 21    CHIEF COMPLAINT:  Chronic systolic heart failure     PLAN OF CARE:  · Severe ischemic cardiomyopathy, LVEF 20-25%; stage D, NYHA class IV symptoms; admitted for initiation of palliative inotropic support and in-patient LVAD workup  · Plan to discharge home on inotropes to optimize RV function/nutrition/conditioning pre-op if patient decides on LVAD surgery     PLAN:  Increase milrinone to 0.3mcg/kg/min  Discontinued coreg due to RV dysfunction   Discontinue ACEi in anticipation of cardiac surgery  Increase hydralazine to 50 mg TID   Cont imdur 30mg twice daily  Cont current dose of eplerenone 50mg daily  Cont potassium to 40meq TID  Patient is not taking SGLT2 inhibitor; HgA1c 7; consider as OP  Hold diuretics today for normal filling pressures   Resume 0.5mg PO daily tomorrow   Not on allopurinol, uric acid wnl  Continue baby ASA   Hold effient (note: may need effient washout prior to surgery and pulmonary nodule biopsy)  Continue current dose of statin  Discontinued ranexa due to bradycardia and no angina symptoms  Continue daily lactulose for elevated ammonia likely due to passive hepatic congestion and miralax twice daily for constipation  PYP test ordered; genetic testing pending  Need records re: indication for DVT filter?   Check ICD interrogation; records obtainefrom EP cardiology at [address] [place] [address] Drive CPAP therapy; home CPAP in hospital  Provided educational materials; HF education  [address] decision tool re: LVAD therapy and hospice   Provided advanced care plan forms to be filled out    DC home today or tomorrow on Milrinone      Continue LVAD-DT workup:  · Pulmonary consult appreciated for nodules suspected for primary malignancy; will need biopsy off effient for 7 days  · Hematology recommendations appreciated, will order bone marrow biopsy when off effient for 7 days   · Palliative care consultation  · Nutritionist consult  · GI recommendations appreciated, will schedule as OP for EGD/Cscope next week for dysphagia/vomiting and colon cancer screening + comment on liver dysfunction       IMPRESSION:  Fatigue at rest  Shortness of breath with minimal exertion  Volume overload  Acute on chronic systolic heart failure  · Stage D, NYHA class IV symptoms  · Non-ischemic cardiomyopathy, LVEF 20% with LVEDD 6.2  · RV dysfunction, TAPSE 1.22 (prelimnary read)  · TBili 1.5 likely from cardiac congestion  At risk of sudden cardiac death  · Recent cardiac arrest   · S/p ICD (1/2013, [facility] followed by [facility])  Coronary artery disease  · S/p multiple interventions  · S/p 4V CABG (8/2012)  · LHC (7/2019) severe stenosis of LIMA to LAD anastomosis site, SVG graft to OM is occluded, SVG graft to RCA 40-50%, LAD occluded proximally, severe proximal LCx, RCA is the long . · PET (6/2019) EF 24% with anterior lateral and inferior reversible defect  Cardiac risk factors  · HTN  · HL  · DM2  · SRUTHI on CPAP  · MIld carotid stenosis  Anemia, microcytic  · Iron deficiency  DVT with filter  Pulmonary nodules      INTERVAL HISTORY:  Resume Milrinone  PBNP and T Bili improved  States he feels well today      CARDIAC IMAGING:  Echo (5/20/21)  · LV: Estimated LVEF is 20 - 25%. Normal wall thickness. Mildly dilated left ventricle. Severely and globally reduced systolic function. Severe (grade 4) left ventricular diastolic dysfunction. · LA: Severely dilated left atrium. · RA: Severely dilated right atrium. · MV: Moderate mitral valve regurgitation is present. · TV: Moderate tricuspid valve regurgitation is present. · AO: Mild aortic root dilatation. · RV: Pacer/ICD present.   · LVED 6.2cm  EKG (5/20/21) SB with 1degree AV block, QRS 116ms    [facility] 5/24/21 Native Coronaries: LM - moderate to severe distal disease 50%. % prox occluded (), heavily calcified, LCx: 80% proximal stenosis. OM1 is  (seen filling faintly via collaterals). OM2 99% stenosis  RCA: 100% proximal occluded.   LIMA to LAD: patent, supplies only a diagonal branch (probably D2). The LAD distal to the bifurcation is 100% occluded () and fills via right to left collaterals off the SVG to RCA.   SVG to R-PDA: patent with moderate diffuse irregularities but no obstructive disease in the graft.    No appealing interventional targets. NST as above    ICD interrogation (5/12/21) [facility] single lead, no events, normal device function and good battery     HEMODYNAMICS:  RHC 5/24/21 CI 1.97, PA 33/9/17, RA 1, PCWP 6- off milrinone   CPEST not done  6MW ordered     OTHER IMAGING:  CXR (6/17/21) clear  CT 5/21/21 1. Irregular pulmonary nodule in the left upper lobe measuring 2 cm, suspicious  for primary pulmonary malignancy. 2. Additional 6 mm left upper lobe pulmonary nodule. 3. Severe calcific atherosclerosis of the coronary arteries and abdominal aorta. No aneurysm. 4. Cardiomegaly. 5. No acute abnormality within the chest, abdomen, or pelvis.     HISTORY OF PRESENT ILLNESS:  I had the pleasure of seeing Heron ROBERT Oconnell Sr in Advanced Heart Failure Clinic at [facility] in [address] [place] [address] a 77 y. o. male with h/o HTN, HL, DM2, SRUTHI on CPAP, chronic systolic heart failure, stage D, NYHA class IV symptoms, non-ischemic cardiomyopathy, LVEF 20% with LVEDD 6.2, RV dysfunciton, TAPSE 1.22, TBili 1.5 likely from cardiac congestion, recent cardiac arrest s/p ICD (1/2013, [facility] followed by [facility]), coronary artery disease s/p multiple interventions s/p 4V CABG (8/2012), [facility] (7/2019) severe stenosis of LIMA to LAD anastomosis site, SVG graft to OM is occluded, SVG graft to RCA 40-50%, LAD occluded proximally, severe proximal LCx, RCA is the long . PET (6/2019) EF 24% with anterior lateral and inferior reversible defect. Anemia, microcytic with iron deficiency, DVT with filter.     Patient was referred to [facility] by Dr. Kole Ku for evaluation of options re: management of advanced heart failure symptoms    PHYSICAL EXAM:  Visit Vitals  BP (!) 142/65   Pulse 88   Temp 98.4 °F (36.9 °C)   Resp 18   Ht 6' 1\" (1.854 m)   Wt 195 lb 5.2 oz (88.6 kg)   SpO2 99%   BMI 25.77 kg/m²     General: Patient is well developed, well-nourished in no acute distress  HEENT: Normocephalic and atraumatic. No scleral icterus. Pupils are equal, round and reactive to light and accomodation. No conjunctival injection. Oropharynx is clear. Neck: Supple. No evidence of thyroid enlargements or lymphadenopathy. JVD: Cannot be appreciated   Lungs: Breath sounds are equal and clear bilaterally. No wheezes, rhonchi, or rales. Heart: Regular rate and rhythm with normal S1 and S2. No murmurs, gallops or rubs. Abdomen: Soft, no mass or tenderness. No organomegaly or hernia. Bowel sounds present. Genitourinary and rectal: deferred  Extremities: No cyanosis, clubbing, or edema. Neurologic: No focal sensory or motor deficits are noted. Grossly intact. Psychiatric: Awake, alert an doriented x 3. Appropriate mood and affect. Skin: Warm, dry and well perfused. No lesions, nodules or rashes are noted. REVIEW OF SYSTEMS:  General: Denies fever, night sweats. Ear, nose and throat: Denies difficulty hearing, sinus problems, runny nose, post-nasal drip, ringing in ears, mouth sores, loose teeth, ear pain, nosebleeds, sore throate, facial pain or numbess  Cardiovascular: see above in the interval history  Respiratory: Denies cough, wheezing, sputum production, hemoptysis.   Gastrointestinal: Denies heartburn, constipation, intolerance to certain foods, diarrhea, abdominal pain, nausea, vomiting, difficulty swallowing, blood in stool  Kidney and bladder: Denies painful urination, frequent urination, urgency, prostate problems and impotence  Musculoskeletal: Denies joint pain, muscle weakness  Skin and hair: Denies change in existing skin lesions, hair loss or increase, breast changes    PAST MEDICAL HISTORY:  Past Medical History:   Diagnosis Date    CAD (coronary artery disease) '90 '97, '13 x 2    MI, code ice in 2013 at [facility]    Calculus of kidney     Colonic polyps     Congestive heart failure, unspecified     Diabetes ([facility] [address]     Gastritis     Hypercholesterolemia     Sleep apnea        PAST SURGICAL HISTORY:  Past Surgical History:   Procedure Laterality Date    COLONOSCOPY  04/06/2011    16, due 21    ENDOSCOPY, COLON, DIAGNOSTIC      11, due 16    HX CORONARY ARTERY BYPASS GRAFT  8/22/12    x 4 vessel by MISSY LION    HX PACEMAKER PLACEMENT  1/30/13    [place] CARDIAC SURG PROCEDURE UNLIST  2012    x 4 vessels       FAMILY HISTORY:  Family History   Problem Relation Age of Onset    Heart Disease Mother         MI    Heart Disease Father         CAD & PVD    Lung Disease Father     Cancer Father         lung    Diabetes Maternal Grandmother     Heart Disease Other         CAD    Stroke Sister        SOCIAL HISTORY:  Social History     Socioeconomic History    Marital status:      Spouse name: Not on file    Number of children: Not on file    Years of education: Not on file    Highest education level: Not on file   Tobacco Use    Smoking status: Never Smoker    Smokeless tobacco: Never Used   Substance and Sexual Activity    Alcohol use:  Yes     Alcohol/week: 0.0 standard drinks     Comment:  VERY RARE    Drug use: No     Social Determinants of Health     Financial Resource Strain:     Difficulty of Paying Living Expenses:    Food Insecurity:     Worried About Running Out of Food in the Last Year:     [address] in the Last Year:    Transportation Needs:     Lack of Transportation (Medical):  Lack of Transportation (Non-Medical):    Physical Activity:     Days of Exercise per Week:     Minutes of Exercise per Session:    Stress:     Feeling of Stress :    Social Connections:     Frequency of Communication with Friends and Family:     Frequency of Social Gatherings with Friends and Family:     Attends Orthodoxy Services:     Active Member of Clubs or Organizations:     Attends Club or Organization Meetings:     Marital Status:        LABORATORY RESULTS:     Labs Latest Ref Rng & Units 5/25/2021 5/24/2021 5/23/2021 5/23/2021 5/22/2021 5/22/2021 5/21/2021   WBC 4.1 - 11.1 K/uL 4. 0(L) 3. 6(L) - 3. 0(L) - 2. 7(L) -   RBC 4.10 - 5.70 M/uL 5.52 5.78(H) - 5.51 - 4.62 -   Hemoglobin 12.1 - 17.0 g/dL 12.8 13.4 - 12.8 - 10. 8(L) -   Hematocrit 36.6 - 50.3 % 41.4 43.7 - 40.9 - 34. 3(L) -   MCV 80.0 - 99.0 FL 75. 0(L) 75. 6(L) - 74. 2(L) - 74. 2(L) -   Platelets 142 - 650 K/uL 158 152 - 135(L) - 109(L) -   Lymphocytes 12 - 49 % - - - - - - -   Monocytes 5 - 13 % - - - - - - -   Eosinophils 0 - 7 % - - - - - - -   Basophils 0 - 1 % - - - - - - -   Albumin 3.5 - 5.0 g/dL 3.2(L) 3.4(L) - 3. 4(L) - 3. 1(L) -   Calcium 8.5 - 10.1 MG/DL 8.9 9.6 9.1 9.2 9.2 8.7 9.1   Glucose 65 - 100 mg/dL 177(H) 125(H) 187(H) 128(H) 188(H) 194(H) -   BUN 6 - 20 MG/DL 13 16 14 15 15 15 -   Creatinine 0.70 - 1.30 MG/DL 0.97 1.10 1.13 1.01 1.23 1.15 -   Sodium 136 - 145 mmol/L 134(L) 135(L) 135(L) 135(L) 138 137 -   Potassium 3.5 - 5.1 mmol/L 4.7 4.1 4.1 3.1(L) 3.7 3. 1(L) -   TSH 0.36 - 3.74 uIU/mL - - - - - - -   PSA 0.01 - 4.0 ng/mL - - - - - - -   LDH 85 - 241 U/L - - - - - - -   Some recent data might be hidden     Lab Results   Component Value Date/Time    TSH 1.97 05/20/2021 04:28 PM    TSH 1.41 02/09/2021 03:05 PM    TSH 1.740 08/14/2018 09:58 AM    TSH 1.710 10/18/2017 12:00 AM       ALLERGY:  Allergies   Allergen Reactions    Pcn [Penicillins] Hives        CURRENT MEDICATIONS:    Current Facility-Administered Medications:     [START ON 5/26/2021] bumetanide (BUMEX) tablet 0.5 mg, 0.5 mg, Oral, DAILY, Terry, Danica B, NP    hydrALAZINE (APRESOLINE) tablet 50 mg, 50 mg, Oral, TID, Terry, Danica B, NP    milrinone (PRIMACOR) 20 MG/100 ML D5W infusion, 0.3 mcg/kg/min, IntraVENous, CONTINUOUS, Terry, Danica B, NP    eplerenone (INSPRA) tablet 50 mg, 50 mg, Oral, DAILY, Shella Barthel, MD, 50 mg at 05/25/21 5937    isosorbide mononitrate ER (IMDUR) tablet 30 mg, 30 mg, Oral, Q12H, Shella Barthel, MD, 30 mg at 05/25/21 0957    potassium chloride SR (KLOR-CON 10) tablet 40 mEq, 40 mEq, Oral, TID, Shella Barthel, MD, 40 mEq at 05/25/21 0957    potassium chloride SR (KLOR-CON 10) tablet 40 mEq, 40 mEq, Oral, Q6H PRN, Shella Barthel, MD    magnesium oxide (MAG-OX) tablet 400 mg, 400 mg, Oral, DAILY, Shella Barthel, MD, 400 mg at 05/25/21 0957    senna-docusate (PERICOLACE) 8.6-50 mg per tablet 1 Tablet, 1 Tablet, Oral, BID, Shella Barthel, MD, 1 Tablet at 05/25/21 0958    polyethylene glycol (MIRALAX) packet 17 g, 17 g, Oral, BID, Shella Barthel, MD, 17 g at 05/25/21 0958    lactulose (CHRONULAC) 10 gram/15 mL solution 15 mL, 15 mL, Oral, DAILY, Terry, Danica B, NP, 15 mL at 05/25/21 9320    aspirin delayed-release tablet 81 mg, 81 mg, Oral, DAILY, Terry, Danica B, NP, 81 mg at 05/25/21 0957    [Held by provider] prasugreL (EFFIENT) tablet 10 mg, 10 mg, Oral, DAILY, Terry, Danica B, NP, 10 mg at 05/23/21 2145    rosuvastatin (CRESTOR) tablet 10 mg, 10 mg, Oral, QHS, Terry, Danica B, NP, 10 mg at 05/24/21 2113    tamsulosin (FLOMAX) capsule 0.4 mg, 0.4 mg, Oral, DAILY, Terry, Danica B, NP, 0.4 mg at 05/25/21 0958    sodium chloride (NS) flush 5-40 mL, 5-40 mL, IntraVENous, Q8H, Terry, Danica B, NP, 10 mL at 05/25/21 0708    sodium chloride (NS) flush 5-40 mL, 5-40 mL, IntraVENous, PRN, Terry, Danica B, NP    acetaminophen (TYLENOL) tablet 650 mg, 650 mg, Oral, Q6H PRN, 650 mg at 05/23/21 0154 **OR** acetaminophen (TYLENOL) suppository 650 mg, 650 mg, Rectal, Q6H PRN, Terry, Danica B, NP    polyethylene glycol (MIRALAX) packet 17 g, 17 g, Oral, DAILY PRN, Terry, Danica B, NP    promethazine (PHENERGAN) tablet 12.5 mg, 12.5 mg, Oral, Q6H PRN **OR** ondansetron (ZOFRAN) injection 4 mg, 4 mg, IntraVENous, Q6H PRN, Terry, Danica B, NP    famotidine (PEPCID) tablet 20 mg, 20 mg, Oral, BID, Terry, Danica B, NP, 20 mg at 05/25/21 0957    ergocalciferol capsule 50,000 Units, 50,000 Units, Oral, Q7D, Giovanna Dukes MD, 50,000 Units at 05/21/21 0124    PATIENT CARE TEAM:  Patient Care Team:  Shital Yeung MD as PCP - General  Shital Yeung MD as PCP - REHABILITATION [facility]  Emerson Osler, MD as Physician (Cardiology)  Sienna Silveira MD as Physician (Cardiology)  Cassy Montenegro MD as Physician (Gastroenterology)  Leo Ocampo MD as Physician (Orthopedic Surgery)  Messi Estes MD as Physician (Ophthalmology)  Albertine Geralds Wilms, MD as Physician ([address])  Kiah Lindo MD (Cardiology)  Giovanna Dukes MD (Cardiology)     Thank you for allowing me to participate in this patient's care.     Jennifer Connolly NP  [address] [place] [address], [address]  Phone: [phone]

## 2021-05-25 NOTE — DISCHARGE INSTRUCTIONS
Please take all medications on this current list and  any new prescriptions    Please call the Heart Failure office for increase in weight over 2lbs      Patient Education        Learning About Heart Failure Zones  What are heart failure zones? Heart failure zones give you an easy way to see changes in your heart failure symptoms. They also tell you when you need to get help. Check every day to see which zone you are in. Green zone. You are doing well. This is where you want to be. · Your weight is stable. It's not going up or down. · You breathe easily. · You are sleeping well. You are able to lie flat without shortness of breath. · You can do your usual activities. Yellow zone. Be careful. Your symptoms are changing. Call your doctor. · You have new or increased shortness of breath. · You are dizzy or lightheaded, or you feel like you may faint. · You have sudden weight gain, such as more than 2 to 3 pounds in a day or 5 pounds in a week. (Your doctor may suggest a different range of weight gain.)  · You have increased swelling in your legs, ankles, or feet. · You are so tired or weak that you can't do your usual activities. · You are not sleeping well. Shortness of breath wakes you up at night. You need extra pillows. Red zone. This is an emergency. Call 911. You have symptoms of sudden heart failure. For example:  · You have severe trouble breathing. · You cough up pink, foamy mucus. · You have a new irregular or fast heartbeat. You have symptoms of a heart attack. These may include:  · Chest pain or pressure, or a strange feeling in the chest.  · Sweating. · Shortness of breath. · Nausea or vomiting. · Pain, pressure, or a strange feeling in the back, neck, jaw, or upper belly or in one or both shoulders or arms. · Lightheadedness or sudden weakness. · A fast or irregular heartbeat.   If you have symptoms of a heart attack: After you call 911, the  may tell you to chew 1 adult-strength or 2 to 4 low-dose aspirin. Wait for an ambulance. Do not try to drive yourself. Follow-up care is a key part of your treatment and safety. Be sure to make and go to all appointments, and call your doctor if you are having problems. It's also a good idea to know your test results and keep a list of the medicines you take. Where can you learn more? Go to http://www.gray.com/  Enter T174 in the search box to learn more about \"Learning About Heart Failure Zones. \"  Current as of: August 31, 2020               Content Version: 12.8  © 2006-2021 [employer]. Care instructions adapted under license by [employer] (which disclaims liability or warranty for this information). If you have questions about a medical condition or this instruction, always ask your healthcare professional. [address] any warranty or liability for your use of this information. Patient Education        Avoiding Triggers With Heart Failure: Care Instructions  Your Care Instructions     Triggers are anything that make your heart failure flare up. A flare-up is also called \"sudden heart failure\" or \"acute heart failure. \" When you have a flare-up, fluid builds up in your lungs, and you have problems breathing. You might need to go to the hospital. By watching for changes in your condition and avoiding triggers, you can prevent heart failure flare-ups. Follow-up care is a key part of your treatment and safety. Be sure to make and go to all appointments, and call your doctor if you are having problems. It's also a good idea to know your test results and keep a list of the medicines you take. How can you care for yourself at home? Watch for changes in your weight and condition  · Weigh yourself without clothing at the same time each day. Record your weight.  Call your doctor if you have sudden weight gain, such as more than 2 to 3 pounds in a day or 5 pounds in a week. (Your doctor may suggest a different range of weight gain.) A sudden weight gain may mean that your heart failure is getting worse. · Keep a daily record of your symptoms. Write down any changes in how you feel, such as new shortness of breath, cough, or problems eating. Also record if your ankles are more swollen than usual and if you feel more tired than usual. Note anything that you ate or did that could have triggered these changes. Limit sodium  Sodium causes your body to hold on to extra water. This may cause your heart failure symptoms to get worse. People get most of their sodium from processed foods. Fast food and restaurant meals also tend to be very high in sodium. · Your doctor may suggest that you limit sodium. Your doctor can tell you how much sodium is right for you. This includes limiting sodium in cooked and packaged foods. · Read food labels on cans and food packages. They tell you how much sodium you get in one serving. Check the serving size. If you eat more than one serving, you are getting more sodium. · Be aware that sodium can come in forms other than salt, including monosodium glutamate (MSG), sodium citrate, and sodium bicarbonate (baking soda). MSG is often added to Asian food. You can sometimes ask for food without MSG or salt. · Slowly reducing salt will help you adjust to the taste. Take the salt shaker off the table. · Flavor your food with garlic, lemon juice, onion, vinegar, herbs, and spices instead of salt. Do not use soy sauce, steak sauce, onion salt, garlic salt, mustard, or ketchup on your food, unless it is labeled \"low-sodium\" or \"low-salt. \"  · Make your own salad dressings, sauces, and ketchup without adding salt. · Use fresh or frozen ingredients, instead of canned ones, whenever you can. Choose low-sodium canned goods. · Eat less processed food and food from restaurants, including fast food.   Exercise as directed  Moderate, regular exercise is very good for your heart. It improves your blood flow and helps control your weight. But too much exercise can stress your heart and cause a heart failure flare-up. · Check with your doctor before you start an exercise program.  · Walking is an easy way to get exercise. Start out slowly. Gradually increase the length and pace of your walk. Swimming, riding a bike, and using a treadmill are also good forms of exercise. · When you exercise, watch for signs that your heart is working too hard. You are pushing yourself too hard if you cannot talk while you are exercising. If you become short of breath or dizzy or have chest pain, stop, sit down, and rest.  · Do not exercise when you do not feel well. Take medicines correctly  · Take your medicines exactly as prescribed. Call your doctor if you think you are having a problem with your medicine. · Make a list of all the medicines you take. Include those prescribed to you by other doctors and any over-the-counter medicines, vitamins, or supplements you take. Take this list with you when you go to any doctor. · Take your medicines at the same time every day. It may help you to post a list of all the medicines you take every day and what time of day you take them. · Make taking your medicine as simple as you can. Plan times to take your medicines when you are doing other things, such as eating a meal or getting ready for bed. This will make it easier to remember to take your medicines. · Get organized. Use helpful tools, such as daily or weekly pill containers. When should you call for help? Call 911 if you have symptoms of sudden heart failure such as:    · You have severe trouble breathing.     · You cough up pink, foamy mucus.     · You have a new irregular or rapid heartbeat.    Call your doctor now or seek immediate medical care if:    · You have new or increased shortness of breath.     · You are dizzy or lightheaded, or you feel like you may faint.     · You have sudden weight gain, such as more than 2 to 3 pounds in a day or 5 pounds in a week. (Your doctor may suggest a different range of weight gain.)     · You have increased swelling in your legs, ankles, or feet.     · You are suddenly so tired or weak that you cannot do your usual activities. Watch closely for changes in your health, and be sure to contact your doctor if you develop new symptoms. Where can you learn more? Go to http://www.gray.com/  Enter V089 in the search box to learn more about \"Avoiding Triggers With Heart Failure: Care Instructions. \"  Current as of: August 31, 2020               Content Version: 12.8  © 2006-2021 [employer]. Care instructions adapted under license by [employer] (which disclaims liability or warranty for this information). If you have questions about a medical condition or this instruction, always ask your healthcare professional. [address] any warranty or liability for your use of this information.

## 2021-05-25 NOTE — PROGRESS NOTES
[facility] in [place], [place]    Ordered outpatient lung biopsy and bone marrow biopsy (Ray Adhikari NP). Called Coordination of Care to have scheduled for Monday, 5/31/21. Patient will be off Effient for 7 days on Sunday, 5/30/21. Spoke with NADEEM who stated they would fax the request to CT at [facility]. Awaiting phone call with patient appointment.

## 2021-05-25 NOTE — PROGRESS NOTES
[facility] in [place], [facility] [address] and spoke with patient's bedside nurse (Neelam Rivera RN) and requested 6MW be completed before patient is discharged. Vero Xie verbalized understanding.

## 2021-05-25 NOTE — PROGRESS NOTES
Hospital follow-up PCP transitional care appointment has been scheduled with Dr. Claudetta Failing for Thursday, 5/27/21 at 2:00 p.m. Pending patient discharge.   Sai Swenson, Care Management Specialist.

## 2021-05-25 NOTE — PROGRESS NOTES
Transitions of Care Plan:  RUR: 12% - low  Clinical Plan: Anticipate discharge on inotrope today  Consults: PICC team; [facility]  Baseline: independent without DME; resides with wife  Disposition: home with home infusion    CM received update from [facility] - denied due to not able to staff in a timely manner. CM sent referral to [facility] - under review for admission. CM sent inotrope orders to [facility]. Need PICC report - spoke with [facility] RN who advised PICC team will be here around 1400 to place line. 1400 - Received confirmation from [facility] that they are able to accept patient for home health services. CM spoke with [facility] - will deliver home inotrope and provide education between 2063-0343. CM spoke with [facility] RN - patient on high priority list for PICC team. Should be by shortly for patient. CM spoke with patient and spouse - provided high level overview again of home health and infusion plan. Both agreeable - wife would like daughter to participate in inotrope education. CM spoke with [facility] RN - agreeable for discharge. CM will continue to follow.     Allegra Auguste, MPH  Care Manager [facility]  Available via [address] [place] [address] or

## 2021-05-25 NOTE — PROGRESS NOTES
PICC Placement Note    PRE-PROCEDURE VERIFICATION  Correct Procedure: yes  Correct Site:  yes  Temperature: Temp: 97.6 °F (36.4 °C), Temperature Source: Temp Source: Oral  Recent Labs     21  0505   BUN 13   CREA 0.97      WBC 4.0*     Allergies: Pcn [penicillins]  Education materials for PICC Care given: yes. See Patient Education activity for further details. PICC Booklet placed at bedside: yes    PROCEDURE DETAIL  Consent was obtained and all questions were answered related to risks and benefits. A double lumen PICC line was inserted, as a sterile procedure using ultrasound and modified Seldinger technique for Home IV Therapy. The following documentation is in addition to the PICC properties in the lines/airways flowsheet :  Lot #: [id number]  Lidocaine 1% administered intradermally :yes  Internal Catheter Total Length: 42 (cm) 4 cm out  Vein Selection for PICC:right basilic  Central Line Bundle followed yes  Complication Related to Insertion:no    The placement was verified by EKG, MAX P WAVE @ 42 (cm). PER EKG PICC TIP @ C/A junction.           Line is okay to use: yes    Sabrina Marks RN

## 2021-05-25 NOTE — PROGRESS NOTES
1930 : Bedside shift change report given to [address] [place] [address] (oncoming nurse) by Kathy (offgoing nurse). Report included the following information SBAR, Intake/Output, MAR, Accordion, Recent Results, Med Rec Status and Cardiac Rhythm NSR.    2100: Small BM, missed hat, unable to collect sample    0015: +BM, soft and loose; FOBT sent     0500: Daily labs sent; [employer] sample drawn, labeled and placed in box, left at [address] [place] [address] for F team to     0820: Bedside shift change report given to Vero Xie (oncoming nurse) by [address] [place] [address] (offgoing nurse). Report included the following information SBAR, Intake/Output, MAR, Accordion, Recent Results, Med Rec Status and Cardiac Rhythm NSR/Sinus Arrhythmia.

## 2021-05-25 NOTE — DISCHARGE SUMMARY
[facility] Discharge Summary     Patient ID:  Oh Lester  [id number]  89 y.o.  1945    Admit date: 5/20/2021    Discharge date: 5/25/2021     Admitting Physician: Jodi Ramos MD         Hospital Problems  Date Reviewed: 4/30/2021          Codes Class Noted POA    Severe protein-calorie malnutrition ([address] ICD-10-CM: Y26  ICD-9-CM: 262  5/21/2021 Yes        Acute on chronic clinical systolic heart failure [facility]) ICD-10-CM: I50.23  ICD-9-CM: 428.23  5/20/2021 Unknown                Discharged Condition: improved    Disposition: home, see patient instructions for treatment and plan    Procedures for this admission:  Procedure(s):  LEFT AND RIGHT HEART CATH / CORONARY ANGIOGRAPHY    Discharge Medications:      My Medications        START taking these medications        Instructions Each Dose to Equal Morning Noon Evening Bedtime   ergocalciferol 1,250 mcg (50,000 unit) capsule  Commonly known as: ERGOCALCIFEROL  Start taking on: May 28, 2021    Your last dose was: Your next dose is: Take 1 Capsule by mouth every seven (7) days for 11 doses. 50,000 Units                 hydrALAZINE 50 mg tablet  Commonly known as: APRESOLINE    Your last dose was: Your next dose is: Take 1 Tablet by mouth three (3) times daily. 50 mg                 magnesium oxide 400 mg tablet  Commonly known as: MAG-OX  Start taking on: May 26, 2021    Your last dose was: Your next dose is: Take 1 Tablet by mouth daily. 400 mg                 milrinone 20 mg/100 mL (200 mcg/mL) infusion  Commonly known as: PRIMACOR    Your last dose was: Your next dose is:         26.58 mcg/min by IntraVENous route continuous. 0.3 mcg/kg/min                 potassium chloride SR 20 mEq tablet  Commonly known as: K-TAB    Your last dose was: Your next dose is: Take 2 Tablets by mouth two (2) times a day.    40 mEq                        CHANGE how you take these medications        Instructions Each Dose to Equal Morning Noon Evening Bedtime   eplerenone 50 mg Tab tablet  Commonly known as: INSPRA  Start taking on: May 26, 2021  What changed:   medication strength  how much to take    Your last dose was: Your next dose is: Take 1 Tablet by mouth daily. 50 mg                 isosorbide mononitrate ER 30 mg tablet  Commonly known as: IMDUR  What changed: when to take this    Your last dose was: Your next dose is: Take 1 Tablet by mouth every twelve (12) hours. 30 mg                        CONTINUE taking these medications        Instructions Each Dose to Equal Morning Noon Evening Bedtime   aspirin delayed-release 81 mg tablet    Your last dose was: Your next dose is: Take 81 mg by mouth daily. 81 mg                 bumetanide 2 mg tablet  Commonly known as: BUMEX    Your last dose was: Your next dose is: Take  by mouth. 1/2 tab every other day. glipiZIDE SR 10 mg CR tablet  Commonly known as: GLUCOTROL XL    Your last dose was: Your next dose is:         TAKE 1 TABLET DAILY (DUE FOR OFFICE VISIT PLEASE CALL OFFICE TO SCHEDULE)                  Iron 325 mg (65 mg iron) tablet  Generic drug: ferrous sulfate    Your last dose was: Your next dose is: Take  by mouth Daily (before breakfast). lancets Misc    Your last dose was: Your next dose is:         Use to test blood sugar daily                  omeprazole 20 mg capsule  Commonly known as: PRILOSEC    Your last dose was: Your next dose is: Take 1 Cap by mouth daily. Indications: indigestion   20 mg                 ondansetron hcl 4 mg tablet  Commonly known as: ZOFRAN    Your last dose was: Your next dose is: Take 1 Tab by mouth every eight (8) hours as needed for Nausea, Vomiting or Nausea or Vomiting. 4 mg                 OneTouch Ultra Blue Test Strip strip  Generic drug: glucose blood VI test strips    Your last dose was:      Your next dose is:         Use to test blood sugar daily                  rosuvastatin 10 mg tablet  Commonly known as: CRESTOR    Your last dose was: Your next dose is:         TAKE 1 TABLET NIGHTLY                  tamsulosin 0.4 mg capsule  Commonly known as: FLOMAX    Your last dose was: Your next dose is:         TAKE 1 CAPSULE DAILY                  Tylenol Extra Strength 500 mg tablet  Generic drug: acetaminophen    Your last dose was: Your next dose is: Take  by mouth every six (6) hours as needed. STOP taking these medications      carvediloL 6.25 mg tablet  Commonly known as: COREG        fosinopriL 10 mg tablet  Commonly known as: MONOPRIL        metFORMIN 500 mg tablet  Commonly known as: GLUCOPHAGE        nitroglycerin 0.4 mg SL tablet  Commonly known as: NITROSTAT        prasugreL 10 mg tablet  Commonly known as: EFFIENT        ranolazine  mg SR tablet  Commonly known as: Ranexa                  Where to Get Your Medications        These medications were sent to [place] [address] #[id number] [address] AT [address], [address] [facility] [address]      Phone: [phone]   eplerenone 50 mg Tab tablet  ergocalciferol 1,250 mcg (50,000 unit) capsule  hydrALAZINE 50 mg tablet  isosorbide mononitrate ER 30 mg tablet  magnesium oxide 400 mg tablet  potassium chloride SR 20 mEq tablet       Information on where to get these meds will be given to you by the nurse or doctor.     Ask your nurse or doctor about these medications  milrinone 20 mg/100 mL (200 mcg/mL) infusion           Hospital Course:      Briefly, Camden Hanley is a 77 y.o. male with h/o HTN, HL, DM2, SRUTHI on CPAP, chronic systolic heart failure, stage D, NYHA class IV symptoms, non-ischemic cardiomyopathy, LVEF 20% with LVEDD 6.2, RV dysfunciton, TAPSE 1.22, TBili 1.5 likely from cardiac congestion, recent cardiac arrest s/p ICD (1/2013, [employer] followed by [facility]), coronary artery disease s/p multiple interventions s/p 4V CABG (8/2012), LHC (7/2019) severe stenosis of LIMA to LAD anastomosis site, SVG graft to OM is occluded, SVG graft to RCA 40-50%, LAD occluded proximally, severe proximal LCx, RCA is the long . PET (6/2019) EF 24% with anterior lateral and inferior reversible defect. Anemia, microcytic with iron deficiency, DVT with filter. [facility]. oDminic Funes for evaluation of options re: management of advanced heart failure symptoms. During admission, patient was diuresed, started on inotropic support and his hemodynamics were optimized. He started his LVAD evaluation and on his Chest CT a 2cm lung nodule was discovered. He is on effient and needed a 7 day washout for a planned lung biopsy. He will be discharged home with Milrinone as a bridge to decision, he has home health and [facility] follow up. Discharge Vital Signs:   Visit Vitals  /63 (BP 1 Location: Left upper arm, BP Patient Position: At rest)   Pulse 84   Temp 97.6 °F (36.4 °C)   Resp 20   Ht 6' 1\" (1.854 m)   Wt 195 lb 5.2 oz (88.6 kg)   SpO2 98%   BMI 25.77 kg/m²       Labs:   Recent Labs     05/25/21  0505   WBC 4.0*   HGB 12.8   HCT 41.4      *   K 4.7   BUN 13   CREA 0.97   *       Diagnostics:  See result section     Patient Instructions/Follow Up Care:  Discharge instructions were reviewed with the patient and family present. Questions were also answered at this time. Prescriptions and medications were reviewed. The patient has a follow up appointment with Dr. Giorgi Brooks on 6/1. The patient was also instructed to follow up with his primary care physician as needed. The patient and family were encouraged to call with any questions or concerns. Signed:  Cristino Schultz NP  5/25/2021  4:33 PM     F ATTENDING ADDENDUM    Patient was seen and examined in person. Data and notes were reviewed.  I have discussed and agree with the plan as noted in the NP note above without further additions.     Lamont Hebert MD PhD  Amanuel Damicoing

## 2021-05-25 NOTE — PROGRESS NOTES
Andres Quintero underwent a 6 min walk test. His initial O2  saturation was 99 % and his baseline heart rate was 74 BPM. He walked from room to [facility] down [address] at a distance of 650ft. His post O2  saturation was 99 % and his post walk heart rate was 81 BPM.      1815- Home infusion at bedside for pump education. Patient has PICC. Wife and daughter at bedside for teaching. 1900- DC teaching with family present. Patient signed out and placed copy in chart.

## 2021-05-25 NOTE — PROGRESS NOTES
PCCM:    Vss, on room air    probnp 2602    Plan:   S/p Left heart cath: noted severe CAD, EF 24%    RAVEN 2cm lesion. Already off effient, starting 5/24.  Will need to be for 7 days before CT-FNA of lung lesion can take place   This could be an outpatient procedure if the primary wishes patient to be discharged soon   O2 titration above 90%     Eunice Rose PA-C

## 2021-05-25 NOTE — TELEPHONE ENCOUNTER
Nathanael Sommer a consult was just called in on this patient I'll give it Claire Vela. Just an FYI Dr. Josi Auguste is over at [facility] this whole week but forwarding the message now for the consult.

## 2021-05-25 NOTE — PROGRESS NOTES
1630: Bedside and Verbal shift change report given to [address] [place] [address] (oncoming nurse) by Fabiola Pereyra (offgoing nurse). Report included the following information SBAR, Kardex, Intake/Output, MAR, Accordion, Recent Results and Cardiac Rhythm NSR 1st degree. 1930: Bedside and Verbal shift change report given to KIRK Lakhani (oncoming nurse) by [address] [place] [address] (offgoing nurse). Report included the following information SBAR, Kardex, Intake/Output, MAR, Accordion, Recent Results and Cardiac Rhythm NSR 1st degree.

## 2021-05-26 ENCOUNTER — TELEPHONE (OUTPATIENT)
Dept: CARDIOLOGY CLINIC | Age: 76
End: 2021-05-26

## 2021-05-26 ENCOUNTER — APPOINTMENT (OUTPATIENT)
Dept: GENERAL RADIOLOGY | Age: 76
DRG: 281 | End: 2021-05-26
Attending: EMERGENCY MEDICINE
Payer: MEDICARE

## 2021-05-26 ENCOUNTER — PATIENT OUTREACH (OUTPATIENT)
Dept: CASE MANAGEMENT | Age: 76
End: 2021-05-26

## 2021-05-26 ENCOUNTER — HOSPITAL ENCOUNTER (INPATIENT)
Age: 76
LOS: 1 days | Discharge: HOME HEALTH | DRG: 281 | End: 2021-05-28
Attending: EMERGENCY MEDICINE | Admitting: INTERNAL MEDICINE
Payer: MEDICARE

## 2021-05-26 DIAGNOSIS — I50.9 CHRONIC CONGESTIVE HEART FAILURE, UNSPECIFIED HEART FAILURE TYPE (HCC): Primary | ICD-10-CM

## 2021-05-26 DIAGNOSIS — R07.9 CHEST PAIN, UNSPECIFIED TYPE: ICD-10-CM

## 2021-05-26 DIAGNOSIS — I50.23 ACUTE ON CHRONIC CLINICAL SYSTOLIC HEART FAILURE (HCC): ICD-10-CM

## 2021-05-26 DIAGNOSIS — Z79.899 RECEIVING INOTROPIC MEDICATION: ICD-10-CM

## 2021-05-26 DIAGNOSIS — I20.0 UNSTABLE ANGINA (HCC): ICD-10-CM

## 2021-05-26 DIAGNOSIS — R07.9 ACUTE CHEST PAIN: Primary | ICD-10-CM

## 2021-05-26 LAB
ADDITIONAL INFORMATION 480297: NORMAL
ALBUMIN SERPL-MCNC: 3.6 G/DL (ref 3.5–5)
ALBUMIN/GLOB SERPL: 0.8 {RATIO} (ref 1.1–2.2)
ALP SERPL-CCNC: 240 U/L (ref 45–117)
ALT SERPL-CCNC: 29 U/L (ref 12–78)
ANION GAP SERPL CALC-SCNC: 9 MMOL/L (ref 5–15)
APTT PPP: 25.9 SEC (ref 22.1–31)
AST SERPL-CCNC: 96 U/L (ref 15–37)
BACTERIA SPEC CULT: NORMAL
BASOPHILS # BLD: 0 K/UL (ref 0–0.1)
BASOPHILS NFR BLD: 1 % (ref 0–1)
BILIRUB SERPL-MCNC: 1.4 MG/DL (ref 0.2–1)
BNP SERPL-MCNC: 2455 PG/ML
BUN SERPL-MCNC: 12 MG/DL (ref 6–20)
BUN/CREAT SERPL: 12 (ref 12–20)
CALCIUM SERPL-MCNC: 9.6 MG/DL (ref 8.5–10.1)
CHLORIDE SERPL-SCNC: 102 MMOL/L (ref 97–108)
CK SERPL-CCNC: 37 U/L (ref 39–308)
CO2 SERPL-SCNC: 21 MMOL/L (ref 21–32)
COMMENT, HOLDF: NORMAL
CREAT SERPL-MCNC: 1 MG/DL (ref 0.7–1.3)
D DIMER PPP FEU-MCNC: 1.86 MG/L FEU (ref 0–0.65)
DEPRECATED HGB OTHER BLD-IMP: NORMAL %
DIFFERENTIAL METHOD BLD: ABNORMAL
EOSINOPHIL # BLD: 0 K/UL (ref 0–0.4)
EOSINOPHIL NFR BLD: 1 % (ref 0–7)
ERYTHROCYTE [DISTWIDTH] IN BLOOD BY AUTOMATED COUNT: 20.6 % (ref 11.5–14.5)
F2 GENE MUT ANL BLD/T: NORMAL
GLOBULIN SER CALC-MCNC: 4.4 G/DL (ref 2–4)
GLUCOSE SERPL-MCNC: 281 MG/DL (ref 65–100)
HCT VFR BLD AUTO: 43.2 % (ref 36.6–50.3)
HEMOGLOBIN A1,HGBE1: 97.5 % (ref 96.4–98.8)
HEMOGLOBIN A2,HGBE2: 1.3 % (ref 1.8–3.2)
HEMOGLOBIN C,HGBE5: 0 %
HEMOGLOBIN E,HGBE6: 0 %
HEMOGLOBIN F,HGBE3: 0.3 % (ref 0–2)
HEMOGLOBIN S,HGBE4: 0 %
HGB A MFR BLD: NORMAL % (ref 96.4–98.8)
HGB A2 MFR BLD COLUMN CHROM: NORMAL % (ref 1.8–3.2)
HGB BLD-MCNC: 13.6 G/DL (ref 12.1–17)
HGB C MFR BLD: NORMAL %
HGB F MFR BLD: NORMAL % (ref 0–2)
HGB FRACT BLD-IMP: NORMAL
HGB S BLD QL SOLY: NORMAL
HGB S MFR BLD: NORMAL %
HGB VARIANT, 121448: 0.9 %
IMM GRANULOCYTES # BLD AUTO: 0 K/UL (ref 0–0.04)
IMM GRANULOCYTES NFR BLD AUTO: 0 % (ref 0–0.5)
INR PPP: 1.1 (ref 0.9–1.1)
INTERPRETATION, RHGBE9: ABNORMAL
LYMPHOCYTES # BLD: 0.8 K/UL (ref 0.8–3.5)
LYMPHOCYTES NFR BLD: 19 % (ref 12–49)
MAGNESIUM SERPL-MCNC: 2.3 MG/DL (ref 1.6–2.4)
MCH RBC QN AUTO: 23.8 PG (ref 26–34)
MCHC RBC AUTO-ENTMCNC: 31.5 G/DL (ref 30–36.5)
MCV RBC AUTO: 75.5 FL (ref 80–99)
MONOCYTES # BLD: 0.5 K/UL (ref 0–1)
MONOCYTES NFR BLD: 11 % (ref 5–13)
NEUTS SEG # BLD: 3.1 K/UL (ref 1.8–8)
NEUTS SEG NFR BLD: 68 % (ref 32–75)
NRBC # BLD: 0 K/UL (ref 0–0.01)
NRBC BLD-RTO: 0 PER 100 WBC
PHOSPHATE SERPL-MCNC: 2.6 MG/DL (ref 2.6–4.7)
PLATELET # BLD AUTO: 160 K/UL (ref 150–400)
PMV BLD AUTO: ABNORMAL FL (ref 8.9–12.9)
POTASSIUM SERPL-SCNC: 4.4 MMOL/L (ref 3.5–5.1)
PROT SERPL-MCNC: 8 G/DL (ref 6.4–8.2)
PROTHROMBIN TIME: 11.4 SEC (ref 9–11.1)
RBC # BLD AUTO: 5.72 M/UL (ref 4.1–5.7)
RBC MORPH BLD: ABNORMAL
SAMPLES BEING HELD,HOLD: NORMAL
SERVICE CMNT-IMP: NORMAL
SODIUM SERPL-SCNC: 132 MMOL/L (ref 136–145)
THERAPEUTIC RANGE,PTTT: NORMAL SECS (ref 58–77)
TROPONIN I SERPL-MCNC: 0.09 NG/ML
WBC # BLD AUTO: 4.4 K/UL (ref 4.1–11.1)

## 2021-05-26 PROCEDURE — 83735 ASSAY OF MAGNESIUM: CPT

## 2021-05-26 PROCEDURE — 71045 X-RAY EXAM CHEST 1 VIEW: CPT

## 2021-05-26 PROCEDURE — 85025 COMPLETE CBC W/AUTO DIFF WBC: CPT

## 2021-05-26 PROCEDURE — 82550 ASSAY OF CK (CPK): CPT

## 2021-05-26 PROCEDURE — 83690 ASSAY OF LIPASE: CPT

## 2021-05-26 PROCEDURE — 85379 FIBRIN DEGRADATION QUANT: CPT

## 2021-05-26 PROCEDURE — 99285 EMERGENCY DEPT VISIT HI MDM: CPT

## 2021-05-26 PROCEDURE — 84443 ASSAY THYROID STIM HORMONE: CPT

## 2021-05-26 PROCEDURE — 93005 ELECTROCARDIOGRAM TRACING: CPT

## 2021-05-26 PROCEDURE — 84100 ASSAY OF PHOSPHORUS: CPT

## 2021-05-26 PROCEDURE — 80053 COMPREHEN METABOLIC PANEL: CPT

## 2021-05-26 PROCEDURE — 36415 COLL VENOUS BLD VENIPUNCTURE: CPT

## 2021-05-26 PROCEDURE — 85610 PROTHROMBIN TIME: CPT

## 2021-05-26 PROCEDURE — 84484 ASSAY OF TROPONIN QUANT: CPT

## 2021-05-26 PROCEDURE — 74011250637 HC RX REV CODE- 250/637: Performed by: EMERGENCY MEDICINE

## 2021-05-26 PROCEDURE — 85730 THROMBOPLASTIN TIME PARTIAL: CPT

## 2021-05-26 PROCEDURE — 94762 N-INVAS EAR/PLS OXIMTRY CONT: CPT

## 2021-05-26 PROCEDURE — 83880 ASSAY OF NATRIURETIC PEPTIDE: CPT

## 2021-05-26 RX ORDER — ASPIRIN 325 MG
325 TABLET ORAL ONCE
Status: COMPLETED | OUTPATIENT
Start: 2021-05-26 | End: 2021-05-26

## 2021-05-26 RX ADMIN — NITROGLYCERIN 1 INCH: 20 OINTMENT TOPICAL at 23:02

## 2021-05-26 RX ADMIN — ASPIRIN 325 MG ORAL TABLET 325 MG: 325 PILL ORAL at 23:02

## 2021-05-26 NOTE — TELEPHONE ENCOUNTER
Called patient using two patient identifiers. Patient verbalized understanding that Centralized Scheduling will be calling to schedule Pulmonary Function Test with [facility] as soon as possible.

## 2021-05-26 NOTE — TELEPHONE ENCOUNTER
Kb Camarena from [facility] Pulmonary Functions lab called to let Sujatha Cerda know that patient did not have his pulmonary function test while inpatient. The order was accidentally marked completed over the weekend but not performed. Patient discharged before able to do the test.  If test needs to be done, please put order back in and  please schedule through outpatient.       His call back is [phone]

## 2021-05-26 NOTE — TELEPHONE ENCOUNTER
Wife called and left voicemail regarding medication questions, I returned her call but she states questions have been answered by nurse navigator. I encouraged her to call back for further questions.

## 2021-05-26 NOTE — PROGRESS NOTES
Care Transitions Initial Call    Call within 2 business days of discharge: Yes     Patient: Jeffrey Irizarry Sr Patient : 1945 MRN: [id number]    Last Discharge [address]       Complaint Diagnosis Description Type Department Provider    21  Acute on chronic clinical systolic heart failure ([facility] [address] . .. Admission (Discharged) Jose Posadas MD        Was this an external facility discharge? No Discharge Facility: n/a    Challenges to be reviewed by the provider   Additional needs identified to be addressed with provider yes  medication-  Patient believes his glipizide was stopped prior to hospitalization. It is still on his med list.        Method of communication with provider : none    Discussed COVID-19 related testing which was not done at this time. Test results were not done. Patient informed of results, if available? n/a     Advance Care Planning:   Does patient have an Advance Directive: yes, reviewed and current     Inpatient Readmission Risk score: Unplanned Readmit Risk Score: 15    Was this a readmission? no   Patient stated reason for the admission: n/a    Patients top risk factors for readmission: medical condition-plan for LVAD workup   Interventions to address risk factors: Obtained and reviewed discharge summary and/or continuity of care documents    Care Transition Nurse (CTN) contacted the family by telephone to perform post hospital discharge assessment. Verified name and  with family as identifiers. Provided introduction to self, and explanation of the CTN role. On speaker with patient and wife. CTN reviewed discharge instructions, medical action plan and red flags with family who verbalized understanding. Were discharge instructions available to patient? yes.  Reviewed appropriate site of care based on symptoms and resources available to patient including: PCP and Specialist. Family given an opportunity to ask questions and does not have any further questions or concerns at this time. The family agrees to contact the PCP office for questions related to their healthcare. Medication reconciliation was performed with family, who verbalizes understanding of administration of home medications. Advised obtaining a 90-day supply of all daily and as-needed medications. Referral to Pharm D needed: no     Home Health/Outpatient orders at discharge: home health care  [address]: [facility]  Date of initial visit: wife will call today. Durable Medical Equipment ordered at discharge: milrinone infusion   [address]: [address] received: medication delivered today    Covid Risk Education    Educated patient about risk for severe COVID-19 due to risk factors according to CDC guidelines. CTN reviewed discharge instructions, medical action plan and red flag symptoms family who verbalized understanding. Discussed COVID vaccination status no. Education provided on COVID-19 vaccination as appropriate. Discussed exposure protocols and quarantine with CDC Guidelines. Family was given an opportunity to verbalize any questions and concerns and agrees to contact CTN or health care provider for questions related to their healthcare. Was patient discharged with a pulse oximeter? no     Discussed follow-up appointments. If no appointment was previously scheduled, appointment scheduling offered: n/a Is follow up appointment scheduled within 7 days of discharge? yes   [facility] follow up appointment(s):   Future Appointments   Date Time Provider Arya Doherty   5/27/2021  2:00 PM Patrizia Gustafson MD [facility] BS AMB   6/1/2021 10:30 AM Noelle Metzger MD [facility] BS AMB     [facility] follow up appointment(s): none    Plan for follow-up call in 3-5 days based on severity of symptoms and risk factors.   Plan for next call: self management-follow up medication/procedure plan and follow up appointment-attend as directed  CTN provided contact information for future needs. Goals Addressed                 This Visit's Progress     Attends follow-up appointments as directed. [facility] follow up appointment(s):   Future Appointments   Date Time Provider Arya Bessy   5/27/2021  2:00 PM Mirian Gustafson MD [facility] BS AMB   6/1/2021 10:30 AM Toro Murphy MD [facility] BS AMB          Prevent complications post hospitalization. Plan is for patient to get work up for possible LVAD. Needs lung bx, bone marrow bx, and colonoscopy per notes.

## 2021-05-27 ENCOUNTER — APPOINTMENT (OUTPATIENT)
Dept: VASCULAR SURGERY | Age: 76
DRG: 281 | End: 2021-05-27
Attending: INTERNAL MEDICINE
Payer: MEDICARE

## 2021-05-27 ENCOUNTER — TELEPHONE (OUTPATIENT)
Dept: INTERNAL MEDICINE CLINIC | Age: 76
End: 2021-05-27

## 2021-05-27 ENCOUNTER — APPOINTMENT (OUTPATIENT)
Dept: CT IMAGING | Age: 76
DRG: 281 | End: 2021-05-27
Attending: EMERGENCY MEDICINE
Payer: MEDICARE

## 2021-05-27 PROBLEM — I21.4 NSTEMI (NON-ST ELEVATED MYOCARDIAL INFARCTION) (HCC): Status: ACTIVE | Noted: 2021-05-27

## 2021-05-27 LAB
APTT PPP: 28.8 SEC (ref 22.1–31)
APTT PPP: 37.6 SEC (ref 22.1–31)
ATRIAL RATE: 104 BPM
CALCULATED P AXIS, ECG09: 23 DEGREES
CALCULATED R AXIS, ECG10: 65 DEGREES
CALCULATED T AXIS, ECG11: 102 DEGREES
DIAGNOSIS, 93000: NORMAL
GLUCOSE BLD STRIP.AUTO-MCNC: 153 MG/DL (ref 65–117)
GLUCOSE BLD STRIP.AUTO-MCNC: 154 MG/DL (ref 65–117)
GLUCOSE BLD STRIP.AUTO-MCNC: 157 MG/DL (ref 65–117)
GLUCOSE BLD STRIP.AUTO-MCNC: 261 MG/DL (ref 65–117)
LIPASE SERPL-CCNC: 320 U/L (ref 73–393)
P-R INTERVAL, ECG05: 184 MS
Q-T INTERVAL, ECG07: 362 MS
QRS DURATION, ECG06: 110 MS
QTC CALCULATION (BEZET), ECG08: 476 MS
SERVICE CMNT-IMP: ABNORMAL
THERAPEUTIC RANGE,PTTT: ABNORMAL SECS (ref 58–77)
THERAPEUTIC RANGE,PTTT: NORMAL SECS (ref 58–77)
TROPONIN I SERPL-MCNC: 10.4 NG/ML
TSH SERPL DL<=0.05 MIU/L-ACNC: 1.47 UIU/ML (ref 0.36–3.74)
VENTRICULAR RATE, ECG03: 104 BPM

## 2021-05-27 PROCEDURE — 65660000000 HC RM CCU STEPDOWN

## 2021-05-27 PROCEDURE — 74011636637 HC RX REV CODE- 636/637: Performed by: INTERNAL MEDICINE

## 2021-05-27 PROCEDURE — 84484 ASSAY OF TROPONIN QUANT: CPT

## 2021-05-27 PROCEDURE — 36415 COLL VENOUS BLD VENIPUNCTURE: CPT

## 2021-05-27 PROCEDURE — 74011250636 HC RX REV CODE- 250/636: Performed by: HOSPITALIST

## 2021-05-27 PROCEDURE — 71275 CT ANGIOGRAPHY CHEST: CPT

## 2021-05-27 PROCEDURE — 74011250637 HC RX REV CODE- 250/637: Performed by: INTERNAL MEDICINE

## 2021-05-27 PROCEDURE — 74011250636 HC RX REV CODE- 250/636: Performed by: INTERNAL MEDICINE

## 2021-05-27 PROCEDURE — 74011000250 HC RX REV CODE- 250: Performed by: HOSPITALIST

## 2021-05-27 PROCEDURE — 85730 THROMBOPLASTIN TIME PARTIAL: CPT

## 2021-05-27 PROCEDURE — 93970 EXTREMITY STUDY: CPT

## 2021-05-27 PROCEDURE — 82962 GLUCOSE BLOOD TEST: CPT

## 2021-05-27 PROCEDURE — 74011250637 HC RX REV CODE- 250/637: Performed by: HOSPITALIST

## 2021-05-27 PROCEDURE — 99222 1ST HOSP IP/OBS MODERATE 55: CPT | Performed by: INTERNAL MEDICINE

## 2021-05-27 PROCEDURE — 74011000636 HC RX REV CODE- 636: Performed by: RADIOLOGY

## 2021-05-27 PROCEDURE — 99232 SBSQ HOSP IP/OBS MODERATE 35: CPT | Performed by: INTERNAL MEDICINE

## 2021-05-27 RX ORDER — LANOLIN ALCOHOL/MO/W.PET/CERES
400 CREAM (GRAM) TOPICAL DAILY
Status: DISCONTINUED | OUTPATIENT
Start: 2021-05-27 | End: 2021-05-28 | Stop reason: HOSPADM

## 2021-05-27 RX ORDER — HEPARIN SODIUM 10000 [USP'U]/100ML
11-25 INJECTION, SOLUTION INTRAVENOUS
Status: DISCONTINUED | OUTPATIENT
Start: 2021-05-27 | End: 2021-05-28 | Stop reason: HOSPADM

## 2021-05-27 RX ORDER — MILRINONE LACTATE 0.2 MG/ML
0.3 INJECTION, SOLUTION INTRAVENOUS
Status: DISCONTINUED | OUTPATIENT
Start: 2021-05-27 | End: 2021-05-28 | Stop reason: HOSPADM

## 2021-05-27 RX ORDER — ACETAMINOPHEN 650 MG/1
650 SUPPOSITORY RECTAL
Status: DISCONTINUED | OUTPATIENT
Start: 2021-05-27 | End: 2021-05-28 | Stop reason: HOSPADM

## 2021-05-27 RX ORDER — ASPIRIN 81 MG/1
81 TABLET ORAL DAILY
Status: DISCONTINUED | OUTPATIENT
Start: 2021-05-27 | End: 2021-05-28 | Stop reason: HOSPADM

## 2021-05-27 RX ORDER — MAGNESIUM SULFATE 100 %
4 CRYSTALS MISCELLANEOUS AS NEEDED
Status: DISCONTINUED | OUTPATIENT
Start: 2021-05-27 | End: 2021-05-28 | Stop reason: HOSPADM

## 2021-05-27 RX ORDER — BENZONATATE 100 MG/1
100 CAPSULE ORAL
Status: DISCONTINUED | OUTPATIENT
Start: 2021-05-27 | End: 2021-05-28 | Stop reason: HOSPADM

## 2021-05-27 RX ORDER — POTASSIUM CHLORIDE 750 MG/1
40 TABLET, FILM COATED, EXTENDED RELEASE ORAL 2 TIMES DAILY
Status: DISCONTINUED | OUTPATIENT
Start: 2021-05-27 | End: 2021-05-28 | Stop reason: HOSPADM

## 2021-05-27 RX ORDER — DEXTROSE 50 % IN WATER (D50W) INTRAVENOUS SYRINGE
12.5-25 AS NEEDED
Status: DISCONTINUED | OUTPATIENT
Start: 2021-05-27 | End: 2021-05-28 | Stop reason: HOSPADM

## 2021-05-27 RX ORDER — SODIUM CHLORIDE 0.9 % (FLUSH) 0.9 %
5-40 SYRINGE (ML) INJECTION AS NEEDED
Status: DISCONTINUED | OUTPATIENT
Start: 2021-05-27 | End: 2021-05-28 | Stop reason: HOSPADM

## 2021-05-27 RX ORDER — INSULIN LISPRO 100 [IU]/ML
INJECTION, SOLUTION INTRAVENOUS; SUBCUTANEOUS
Status: DISCONTINUED | OUTPATIENT
Start: 2021-05-27 | End: 2021-05-28 | Stop reason: HOSPADM

## 2021-05-27 RX ORDER — ROSUVASTATIN CALCIUM 10 MG/1
10 TABLET, COATED ORAL
Status: DISCONTINUED | OUTPATIENT
Start: 2021-05-27 | End: 2021-05-28 | Stop reason: HOSPADM

## 2021-05-27 RX ORDER — BUMETANIDE 1 MG/1
1 TABLET ORAL EVERY OTHER DAY
Status: DISCONTINUED | OUTPATIENT
Start: 2021-05-27 | End: 2021-05-28 | Stop reason: HOSPADM

## 2021-05-27 RX ORDER — TAMSULOSIN HYDROCHLORIDE 0.4 MG/1
0.4 CAPSULE ORAL DAILY
Status: DISCONTINUED | OUTPATIENT
Start: 2021-05-27 | End: 2021-05-28 | Stop reason: HOSPADM

## 2021-05-27 RX ORDER — HYDRALAZINE HYDROCHLORIDE 50 MG/1
50 TABLET, FILM COATED ORAL 3 TIMES DAILY
Status: DISCONTINUED | OUTPATIENT
Start: 2021-05-27 | End: 2021-05-28 | Stop reason: HOSPADM

## 2021-05-27 RX ORDER — HEPARIN SODIUM 5000 [USP'U]/ML
4000 INJECTION, SOLUTION INTRAVENOUS; SUBCUTANEOUS ONCE
Status: COMPLETED | OUTPATIENT
Start: 2021-05-27 | End: 2021-05-27

## 2021-05-27 RX ORDER — ACETAMINOPHEN 325 MG/1
650 TABLET ORAL
Status: DISCONTINUED | OUTPATIENT
Start: 2021-05-27 | End: 2021-05-28 | Stop reason: HOSPADM

## 2021-05-27 RX ORDER — POLYETHYLENE GLYCOL 3350 17 G/17G
17 POWDER, FOR SOLUTION ORAL DAILY PRN
Status: DISCONTINUED | OUTPATIENT
Start: 2021-05-27 | End: 2021-05-28 | Stop reason: HOSPADM

## 2021-05-27 RX ORDER — SODIUM CHLORIDE 0.9 % (FLUSH) 0.9 %
5-40 SYRINGE (ML) INJECTION EVERY 8 HOURS
Status: DISCONTINUED | OUTPATIENT
Start: 2021-05-27 | End: 2021-05-28 | Stop reason: HOSPADM

## 2021-05-27 RX ORDER — EPLERENONE 25 MG/1
50 TABLET, FILM COATED ORAL DAILY
Status: DISCONTINUED | OUTPATIENT
Start: 2021-05-27 | End: 2021-05-28 | Stop reason: HOSPADM

## 2021-05-27 RX ORDER — ISOSORBIDE MONONITRATE 30 MG/1
30 TABLET, EXTENDED RELEASE ORAL EVERY 12 HOURS
Status: DISCONTINUED | OUTPATIENT
Start: 2021-05-27 | End: 2021-05-28 | Stop reason: HOSPADM

## 2021-05-27 RX ADMIN — POTASSIUM CHLORIDE 40 MEQ: 750 TABLET, EXTENDED RELEASE ORAL at 09:32

## 2021-05-27 RX ADMIN — BENZONATATE 100 MG: 100 CAPSULE ORAL at 18:37

## 2021-05-27 RX ADMIN — HYDRALAZINE HYDROCHLORIDE 50 MG: 50 TABLET, FILM COATED ORAL at 22:14

## 2021-05-27 RX ADMIN — HEPARIN SODIUM 4000 UNITS: 5000 INJECTION INTRAVENOUS; SUBCUTANEOUS at 22:14

## 2021-05-27 RX ADMIN — ASPIRIN 81 MG: 81 TABLET, COATED ORAL at 09:32

## 2021-05-27 RX ADMIN — TAMSULOSIN HYDROCHLORIDE 0.4 MG: 0.4 CAPSULE ORAL at 09:32

## 2021-05-27 RX ADMIN — HYDRALAZINE HYDROCHLORIDE 50 MG: 50 TABLET, FILM COATED ORAL at 15:40

## 2021-05-27 RX ADMIN — EPLERENONE 50 MG: 25 TABLET, FILM COATED ORAL at 09:32

## 2021-05-27 RX ADMIN — POTASSIUM CHLORIDE 40 MEQ: 750 TABLET, EXTENDED RELEASE ORAL at 17:18

## 2021-05-27 RX ADMIN — MILRINONE LACTATE IN DEXTROSE 0.3 MCG/KG/MIN: 200 INJECTION, SOLUTION INTRAVENOUS at 05:15

## 2021-05-27 RX ADMIN — ISOSORBIDE MONONITRATE 30 MG: 30 TABLET, EXTENDED RELEASE ORAL at 22:14

## 2021-05-27 RX ADMIN — ISOSORBIDE MONONITRATE 30 MG: 30 TABLET, EXTENDED RELEASE ORAL at 09:32

## 2021-05-27 RX ADMIN — HEPARIN SODIUM 11 UNITS/KG/HR: 10000 INJECTION, SOLUTION INTRAVENOUS at 07:04

## 2021-05-27 RX ADMIN — ALTEPLASE 1 MG: 2.2 INJECTION, POWDER, LYOPHILIZED, FOR SOLUTION INTRAVENOUS at 23:50

## 2021-05-27 RX ADMIN — MILRINONE LACTATE IN DEXTROSE 0.3 MCG/KG/MIN: 200 INJECTION, SOLUTION INTRAVENOUS at 16:09

## 2021-05-27 RX ADMIN — IOPAMIDOL 80 ML: 755 INJECTION, SOLUTION INTRAVENOUS at 00:50

## 2021-05-27 RX ADMIN — ROSUVASTATIN 10 MG: 10 TABLET, FILM COATED ORAL at 04:49

## 2021-05-27 RX ADMIN — BUMETANIDE 1 MG: 1 TABLET ORAL at 04:49

## 2021-05-27 RX ADMIN — INSULIN LISPRO 5 UNITS: 100 INJECTION, SOLUTION INTRAVENOUS; SUBCUTANEOUS at 17:17

## 2021-05-27 RX ADMIN — MAGNESIUM GLUCONATE 500 MG ORAL TABLET 400 MG: 500 TABLET ORAL at 09:32

## 2021-05-27 RX ADMIN — Medication 10 ML: at 22:15

## 2021-05-27 RX ADMIN — HEPARIN SODIUM 4000 UNITS: 5000 INJECTION INTRAVENOUS; SUBCUTANEOUS at 06:53

## 2021-05-27 RX ADMIN — Medication 10 ML: at 09:39

## 2021-05-27 RX ADMIN — Medication 10 ML: at 14:40

## 2021-05-27 RX ADMIN — ROSUVASTATIN 10 MG: 10 TABLET, FILM COATED ORAL at 22:14

## 2021-05-27 RX ADMIN — HYDRALAZINE HYDROCHLORIDE 50 MG: 50 TABLET, FILM COATED ORAL at 09:32

## 2021-05-27 NOTE — TELEPHONE ENCOUNTER
[phone]  [facility]  Thank you for referring him to DR. Harlan Read.  He is back at the ER at [facility]

## 2021-05-27 NOTE — PROGRESS NOTES
14:01 TRANSFER - IN REPORT:    Verbal report received from [address]  RN(name) on [facility]  being received from ED(unit) for routine progression of care      Report consisted of patients Situation, Background, Assessment and   Recommendations(SBAR). Information from the following report(s) SBAR was reviewed with the receiving nurse. Opportunity for questions and clarification was provided. Assessment to be completed upon patients arrival to unit and care assumed. 19:50 Bedside shift change report given to Giulia Faulkner (oncoming nurse) by Mina Gardner (offgoing nurse). Report included the following information SBAR.

## 2021-05-27 NOTE — ED NOTES
Notified by the lab that the blue top drawn on this patient had clotted 45 minutes after the labs were sent and had been labeled as in process in the lab. Heparin drip had already been started on this patient without an initial PTT level because of this lab error.

## 2021-05-27 NOTE — ED NOTES
Verbal shift change report given to Jenene Fothergill, RN (oncoming nurse) by Deloris Thomason RN (offgoing nurse). Report included the following information SBAR, Kardex, ED Summary, [facility] and Recent Results.

## 2021-05-27 NOTE — ED NOTES
Pt arrives via EMS from home with c/o chest pain that started 1 hour ago in the center of chest going down to stomach. Pt was discharged yesterday after being admitted for heart failure, he had a milronone pump placed. Pt has a history of 5 heart attacks with no stents.   EMS reports vitals WNL and a bundle brach block on the 12 lead EKG

## 2021-05-27 NOTE — PROGRESS NOTES
[facility] Adult  Hospitalist Group                                                                                          Hospitalist Progress Note  Fabricio Palmer MD  Answering service: [phone] from in house phone        Date of Service:  2021  NAME:  Ajay Esteban  :  1945  MRN:  [id number]      Admission Summary: This is a 80-year-old man with a past medical history significant for coronary artery disease, status post CABG, hypertension, dyslipidemia, type 2 diabetes, obstructive sleep apnea, chronic systolic congestive heart failure, nonischemic cardiomyopathy, status post AICD placement, who was in his usual state of health until the day of presentation at the emergency room when the patient developed chest pain. The chest pain started 1 hour before coming to the emergency room. The pain is constant, located at the center of the chest with no radiation. No known aggravating or relieving factors. No associated nausea or vomiting. No diaphoresis. The patient described the pain as pressure-like, 5/10 in severity. When the patient arrived at the emergency room, the patient's initial troponin level was elevated. He was recently admitted to this hospital from 2021 to 2021. The patient was admitted by the Heart Failure team for optimization of treatment of his congestive heart failure. The patient was also started on milrinone drip. Evaluation for LVAD placement was also initiated. The patient was discharged home in stable condition. When the patient arrived at the emergency room, a CTA of the chest was obtained. This was negative for pulmonary embolism.   The patient was subsequently referred to the hospitalist service for evaluation for admission.       Interval history / Subjective:     F/u Chest pain/NSTEMI  Says his chest pain has resolved now but still has cough  Cough has been bothering him since last 2 months     Assessment & Plan: Suspected non-ST-elevation myocardial infarction  -in a patient with CAD s/p CABG  -Per Dr Messer, he just had a right and left heart cath on 5/24/21 that showed severe diffuse native vessel CAD, with patent grafts (LIMA to D2, SVG to RCA). There were no good interventional targets noted  -Troponin now trended to 10.4  -Was taken off Effient for FNA of lung lesion  -on heparin drip  -Informed adv heart failure  -Appreciate discussion with Dr Messer. Nonischemic cardiomyopathy, status post AICD placement.    -The patient is being considered for LVAD. We will await further recommendation from the cardiologist.    Hypertension  -Continue home meds    Dyslipidemia. continue home meds  Type 2 diabetes with hyperglycemia. SSI  Obstructive sleep apnea. We will place the patient on CPAP. Chronic systolic congestive heart failure. Seems compensatedContinue home med  Monoclonal spike. Outpatient follow up with Dr Santiago Cardoza  Pulmonary nodules. stable on CT chest. Outpatient follow up with Pulmonary for FNA  Elevated D-dimer. CTA chest unremarkable for PE  Hyponatremia. This is most likely due to chronic congestive heart failure. Monitor    Diabetic diet    Code status: FULL CODE  DVT prophylaxis: heparin drip    Care Plan discussed with: Patient/Family  Anticipated Disposition: Home w/Family  Anticipated Discharge: 24 hours to 48 hours     Hospital Problems  Date Reviewed: 5/27/2021        Codes Class Noted POA    * (Principal) NSTEMI (non-ST elevated myocardial infarction) [facility]) ICD-10-CM: I21.4  ICD-9-CM: 410.70  5/27/2021 Yes                Review of Systems:   A comprehensive review of systems was negative except for that written in the HPI. Vital Signs:    Last 24hrs VS reviewed since prior progress note.  Most recent are:  Visit Vitals  BP (!) 146/97   Pulse 85   Temp 98.1 °F (36.7 °C)   Resp (!) 36   Ht 6' 1\" (1.854 m)   Wt 88.6 kg (195 lb 5.2 oz)   SpO2 96%   BMI 25.77 kg/m²       No intake or output data in the 24 hours ending 05/27/21 0831     Physical Examination:     I had a face to face encounter with this patient and independently examined them on 5/27/2021 as outlined below:          Constitutional:  No acute distress   ENT:  Oral mucosa moist, oropharynx benign. Resp:  CTA bilaterally. No wheezing/rhonchi/rales. No accessory muscle use   CV:  Regular rhythm, normal rate, no murmurs, gallops, rubs    GI:  Soft, non distended, non tender. normoactive bowel sounds, no hepatosplenomegaly     Musculoskeletal:  No edema, warm, 2+ pulses throughout    Neurologic:  Moves all extremities. AAOx3, CN II-XII reviewed            Data Review:    Review and/or order of clinical lab test      Labs:     Recent Labs     05/26/21 2244 05/25/21  0505   WBC 4.4 4.0*   HGB 13.6 12.8   HCT 43.2 41.4    158     Recent Labs     05/26/21 2244 05/25/21  0505   * 134*   K 4.4 4.7    104   CO2 21 24   BUN 12 13   CREA 1.00 0.97   * 177*   CA 9.6 8.9   MG 2.3 2.2   PHOS 2.6  --      Recent Labs     05/26/21 2244 05/25/21  0505   ALT 29 22   * 219*   TBILI 1.4* 1.4*   TP 8.0 7.3   ALB 3.6 3.2*   GLOB 4.4* 4.1*   LPSE 320  --      Recent Labs     05/26/21  2300   INR 1.1   PTP 11.4*   APTT 25.9      No results for input(s): FE, TIBC, PSAT, FERR in the last 72 hours. Lab Results   Component Value Date/Time    Folate 15.7 05/23/2021 04:48 AM      No results for input(s): PH, PCO2, PO2 in the last 72 hours.   Recent Labs     05/27/21 0528 05/26/21 2243   TROIQ 10.40* 0.09*     Lab Results   Component Value Date/Time    Cholesterol, total 112 05/20/2021 04:28 PM    HDL Cholesterol 44 05/20/2021 04:28 PM    LDL, calculated 50.8 05/20/2021 04:28 PM    Triglyceride 86 05/20/2021 04:28 PM    CHOL/HDL Ratio 2.5 05/20/2021 04:28 PM     Lab Results   Component Value Date/Time    Glucose (POC) 210 (H) 05/21/2021 11:10 AM    Glucose (POC) 73 05/21/2021 06:47 AM    Glucose (POC) 146 (H) 05/20/2021 09:24 PM    Glucose  02/20/2017 11:11 AM     Lab Results   Component Value Date/Time    Color YELLOW/STRAW 05/21/2021 02:33 PM    Appearance CLEAR 05/21/2021 02:33 PM    Specific gravity 1.006 05/21/2021 02:33 PM    pH (UA) 7.0 05/21/2021 02:33 PM    Protein Negative 05/21/2021 02:33 PM    Glucose Negative 05/21/2021 02:33 PM    Ketone Negative 05/21/2021 02:33 PM    Bilirubin Negative 05/21/2021 02:33 PM    Urobilinogen 1.0 05/21/2021 02:33 PM    Nitrites Negative 05/21/2021 02:33 PM    Leukocyte Esterase Negative 05/21/2021 02:33 PM    Epithelial cells FEW 05/21/2021 02:33 PM    Bacteria Negative 05/21/2021 02:33 PM    WBC 0-4 05/21/2021 02:33 PM    RBC 0-5 05/21/2021 02:33 PM         Medications Reviewed:     Current Facility-Administered Medications   Medication Dose Route Frequency    aspirin delayed-release tablet 81 mg  81 mg Oral DAILY    bumetanide (BUMEX) tablet 1 mg  1 mg Oral EVERY OTHER DAY    eplerenone (INSPRA) tablet 50 mg  50 mg Oral DAILY    hydrALAZINE (APRESOLINE) tablet 50 mg  50 mg Oral TID    isosorbide mononitrate ER (IMDUR) tablet 30 mg  30 mg Oral Q12H    magnesium oxide (MAG-OX) tablet 400 mg  400 mg Oral DAILY    milrinone (PRIMACOR) 20 MG/100 ML D5W infusion  0.3 mcg/kg/min IntraVENous TITRATE    tamsulosin (FLOMAX) capsule 0.4 mg  0.4 mg Oral DAILY    rosuvastatin (CRESTOR) tablet 10 mg  10 mg Oral QHS    potassium chloride SR (KLOR-CON 10) tablet 40 mEq  40 mEq Oral BID    sodium chloride (NS) flush 5-40 mL  5-40 mL IntraVENous Q8H    sodium chloride (NS) flush 5-40 mL  5-40 mL IntraVENous PRN    acetaminophen (TYLENOL) tablet 650 mg  650 mg Oral Q6H PRN    Or    acetaminophen (TYLENOL) suppository 650 mg  650 mg Rectal Q6H PRN    polyethylene glycol (MIRALAX) packet 17 g  17 g Oral DAILY PRN    prochlorperazine (COMPAZINE) with saline injection 5 mg  5 mg IntraVENous Q6H PRN    insulin lispro (HUMALOG) injection   SubCUTAneous AC&HS    glucose chewable tablet 16 g  4 Tablet Oral PRN    dextrose (D50W) injection syrg 12.5-25 g  12.5-25 g IntraVENous PRN    glucagon (GLUCAGEN) injection 1 mg  1 mg IntraMUSCular PRN    heparin 25,000 units in D5W 250 ml infusion  11-25 Units/kg/hr IntraVENous TITRATE     Current Outpatient Medications   Medication Sig    eplerenone (INSPRA) 50 mg tab tablet Take 1 Tablet by mouth daily.  isosorbide mononitrate ER (IMDUR) 30 mg tablet Take 1 Tablet by mouth every twelve (12) hours.  [START ON 5/28/2021] ergocalciferol (ERGOCALCIFEROL) 1,250 mcg (50,000 unit) capsule Take 1 Capsule by mouth every seven (7) days for 11 doses.  hydrALAZINE (APRESOLINE) 50 mg tablet Take 1 Tablet by mouth three (3) times daily.  magnesium oxide (MAG-OX) 400 mg tablet Take 1 Tablet by mouth daily.  milrinone (PRIMACOR) 20 mg/100 mL (200 mcg/mL) infusion 26.58 mcg/min by IntraVENous route continuous.  potassium chloride SR (K-TAB) 20 mEq tablet Take 2 Tablets by mouth two (2) times a day.  ondansetron hcl (ZOFRAN) 4 mg tablet Take 1 Tab by mouth every eight (8) hours as needed for Nausea, Vomiting or Nausea or Vomiting.  rosuvastatin (CRESTOR) 10 mg tablet TAKE 1 TABLET NIGHTLY    ferrous sulfate (Iron) 325 mg (65 mg iron) tablet Take  by mouth Daily (before breakfast). (Patient not taking: Reported on 5/20/2021)    omeprazole (PRILOSEC) 20 mg capsule Take 1 Cap by mouth daily. Indications: indigestion (Patient not taking: Reported on 5/26/2021)    tamsulosin (FLOMAX) 0.4 mg capsule TAKE 1 CAPSULE DAILY    glipiZIDE SR (GLUCOTROL XL) 10 mg CR tablet TAKE 1 TABLET DAILY (DUE FOR OFFICE VISIT PLEASE CALL OFFICE TO SCHEDULE) (Patient not taking: Reported on 5/26/2021)    glucose blood VI test strips (OneTouch Ultra Blue Test Strip) strip Use to test blood sugar daily    lancets misc Use to test blood sugar daily    bumetanide (BUMEX) 2 mg tablet Take  by mouth. 1/2 tab every other day.     acetaminophen (TYLENOL EXTRA STRENGTH) 500 mg tablet Take  by mouth every six (6) hours as needed.  aspirin delayed-release 81 mg tablet Take 81 mg by mouth daily.      ______________________________________________________________________  EXPECTED LENGTH OF STAY: - - -  ACTUAL LENGTH OF STAY:          Jacqueline Diaz MD

## 2021-05-27 NOTE — ED PROVIDER NOTES
The patient is a 81-year-old male with a past medical history significant for coronary disease, kidney stone, CHF, diabetes, gastritis, hypercholesterolemia and sleep apnea who presents to the ED with a complaint of chest pain described as heaviness and pressure that began approximately 1 hour prior to arrival.  The patient denies any fever chills, sore throat, cough or congestion, headache, blurred vision, neck or back pain, abdominal pain, diarrhea, constipation, dysuria, dizziness, extremity weakness numbness, sick contact, skin rash and recent travel. The patient was just admitted to the hospital for CHF exacerbation and discharged 2 days ago. Past Medical History:   Diagnosis Date    CAD (coronary artery disease) '90 '97, '13 x 2    MI, code ice in 2013 at [facility]    Calculus of kidney     Colonic polyps     Congestive heart failure, unspecified     Diabetes ([facility] [address]     Gastritis     Hypercholesterolemia     Sleep apnea        Past Surgical History:   Procedure Laterality Date    COLONOSCOPY  04/06/2011    16, due 21    ENDOSCOPY, COLON, DIAGNOSTIC      11, due 16    HX CORONARY ARTERY BYPASS GRAFT  8/22/12    x 4 vessel by S.  James    HX HEENT      LASIK    HX PACEMAKER PLACEMENT  1/30/13    IN CARDIAC SURG PROCEDURE UNLIST  2012    x 4 vessels         Family History:   Problem Relation Age of Onset    Heart Disease Mother         MI    Heart Disease Father         CAD & PVD    Lung Disease Father     Cancer Father         lung    Diabetes Maternal Grandmother     Heart Disease Other         CAD    Stroke Sister        Social History     Socioeconomic History    Marital status:      Spouse name: Not on file    Number of children: Not on file    Years of education: Not on file    Highest education level: Not on file   Occupational History    Not on file   Tobacco Use    Smoking status: Never Smoker    Smokeless tobacco: Never Used   Substance and Sexual Activity    Alcohol use: Yes     Alcohol/week: 0.0 standard drinks     Comment:  VERY RARE    Drug use: No    Sexual activity: Not on file   Other Topics Concern    Not on file   Social History Narrative    Not on file     Social Determinants of Health     Financial Resource Strain:     Difficulty of Paying Living Expenses:    Food Insecurity:     Worried About Running Out of Food in the Last Year:     [address] in the Last Year:    Transportation Needs:     Lack of Transportation (Medical):  Lack of Transportation (Non-Medical):    Physical Activity:     Days of Exercise per Week:     Minutes of Exercise per Session:    Stress:     Feeling of Stress :    Social Connections:     Frequency of Communication with Friends and Family:     Frequency of Social Gatherings with Friends and Family:     Attends Congregation Services:     Active Member of Clubs or Organizations:     Attends Club or Organization Meetings:     Marital Status:    Intimate Partner Violence:     Fear of Current or Ex-Partner:     Emotionally Abused:     Physically Abused:     Sexually Abused: ALLERGIES: Pcn [penicillins]    Review of Systems   All other systems reviewed and are negative. Vitals:    05/26/21 2221   BP: 136/85   Pulse: (!) 106   Resp: 19   Temp: 98.1 °F (36.7 °C)   SpO2: 98%   Weight: 88.6 kg (195 lb 5.2 oz)   Height: 6' 1\" (1.854 m)            Physical Exam  Vitals and nursing note reviewed. CONSTITUTIONAL: Well-appearing; well-nourished; in no apparent distress  HEAD: Normocephalic; atraumatic  EYES: PERRL; EOM intact; conjunctiva and sclera are clear bilaterally. ENT: No rhinorrhea; normal pharynx with no tonsillar hypertrophy; mucous membranes pink/moist, no erythema, no exudate. NECK: Supple; non-tender; no cervical lymphadenopathy  CARD: Normal S1, S2; no murmurs, rubs, or gallops. Regular rate and rhythm.   RESP: Normal respiratory effort; breath sounds clear and equal bilaterally; no wheezes, rhonchi, or rales. ABD: Normal bowel sounds; non-distended; non-tender; no palpable organomegaly, no masses, no bruits. Back Exam: Normal inspection; no vertebral point tenderness, no CVA tenderness. Normal range of motion. EXT: Normal ROM in all four extremities; non-tender to palpation; no swelling or deformity; distal pulses are normal, no edema. SKIN: Warm; dry; no rash. NEURO:Alert and oriented x 3, coherent, [id number] grossly intact, sensory and motor are non-focal.        MDM  Number of Diagnoses or Management Options  Acute chest pain  Unstable angina ([facility] [address]  Diagnosis management comments: Assessment: 81-year-old male who presents to the ED for evaluation for chest pain. Symptoms appear consistent with stable angina. The patient has moderate to severe risk factors for ACS. Plan: EKG/chest x-ray/lab/IV fluid/aspirin education, reassurance, symptomatic treatment/serial exam/ Monitor and Reevaluate. Amount and/or Complexity of Data Reviewed  Clinical lab tests: ordered and reviewed  Tests in the radiology section of CPT®: ordered and reviewed  Tests in the medicine section of CPT®: reviewed and ordered  Discussion of test results with the performing providers: yes  Decide to obtain previous medical records or to obtain history from someone other than the patient: yes  Obtain history from someone other than the patient: yes  Review and summarize past medical records: yes  Discuss the patient with other providers: yes  Independent visualization of images, tracings, or specimens: yes    Risk of Complications, Morbidity, and/or Mortality  Presenting problems: moderate  Diagnostic procedures: moderate  Management options: moderate    Patient Progress  Patient progress: stable         Procedures    ED EKG interpretation:  Rhythm: sinus tachycardia; and irregular with fusion complexes rate (approx.): 104;  Axis: normal; P wave: normal; QRS interval: normal ; ST/T wave: non-specific changes; in Lead: Diffusely; Other findings: abnormal ekg. This EKG was interpreted by Kamlesh Ramirez MD,ED Provider. XRAY INTERPRETATION (ED MD)  Chest Xray  No acute process seen. Normal heart size. No bony abnormalities. No infiltrate. Tu Sifuentes MD 12:25 AM    PROGRESS NOTE:  Pt has been reexamined by Tu Sifuentes MD all available results have been reviewed with pt and any available family. Pt understands sx, dx, and tx in ED. Care plan has been outlined and questions have been answered. Pt and any available family understands and agrees to need for admission to hospital for further tx not available in ED. Pt is ready for admission. Written by Kamlesh Ramirez MD,  12:25 AM    Perfect Serve Consult for Admission  12:26 AM    ED Room Number: ER16/16  Patient Name and age: Jeffrey Irizarry Sr 77 y.o.  male  Working Diagnosis:   1. Acute chest pain    2. Unstable angina ([address]        COVID-19 Suspicion:  no  Sepsis present:  no  Reassessment needed: no  Code Status:  Full Code  Readmission: no  Isolation Requirements:  no  Recommended Level of Care:  telemetry  Department:[facility] Adult ED - 21   Other:      CONSULT NOTE:  Tu Sifuentes MD spoke with Dr. Rosas Clark of the adult hospitalist team. Discussed patient's presentation, history, physical assessment, and available diagnostic results. He will evaluate, write orders and admit the patient to the hospital. 12:26 AM        .     .

## 2021-05-27 NOTE — PROGRESS NOTES
[facility] in [facility] HEALTHCARE  Inpatient Consult Progress Note      Patient name: Andres Quintero  Patient : 1945  Patient MRN: [id number]  Consulting MD: Samira Dash MD  Primary general cardiologist:  Dr. Susu Strong    Date of service: 21    CHIEF COMPLAINT:  Chief Complaint   Patient presents with    Chest Pain       PLAN OF CARE:  · Severe ischemic cardiomyopathy, LVEF 20-25%; stage D, NYHA class IV symptoms.      PLAN:  Cont milrinone  0.3mcg/kg/min  Discontinued coreg due to RV dysfunction   Discontinue ACEi in anticipation of cardiac surgery  Cont hydralazine to 50 mg TID   Cont imdur 30mg twice daily  Cont current dose of eplerenone 50mg daily  Cont potassium to 40meq BID  Patient is not taking SGLT2 inhibitor; HgA1c 7; consider as OP  Cont bumex 1mg every other day    Not on allopurinol, uric acid wnl  Continue baby ASA   Hold effient d/w with Dr. Cheryl Harada- will monitor for now   Appreciate cardiology recommendations   Continue current dose of statin  Consider resuming Ranexa   PYP test equivocal  Genetic testing pending  Need records re: indication for DVT filter?   Records obtainefrom EP cardiology at [address] [place] [address] Drive CPAP therapy; home CPAP in hospital  [facility] decision tool re: LVAD therapy and hospice   Provided advanced care plan forms to be filled out       Continue LVAD-DT workup:  · Pulmonary consult appreciated for nodules suspected for primary malignancy; will need biopsy   · Hematology recommendations appreciated, will order bone marrow biopsy   · Palliative care consultation  · Nutritionist consult  · GI recommendations appreciated, will schedule as OP for EGD/Cscope next week for dysphagia/vomiting and colon cancer screening + comment on liver dysfunction       IMPRESSION:  Fatigue at rest  Shortness of breath with minimal exertion  Volume overload  Acute on chronic systolic heart failure  · Stage D, NYHA class IV symptoms  · Non-ischemic cardiomyopathy, LVEF 20% with LVEDD 6.2  · RV dysfunction, TAPSE 1.22 (prelimnary read)  · TBili 1.5 likely from cardiac congestion  At risk of sudden cardiac death  · Recent cardiac arrest   · S/p ICD (1/2013, [employer] followed by [address] [place] [address])  Coronary artery disease  · S/p multiple interventions  · S/p 4V CABG (8/2012)  · LHC (7/2019) severe stenosis of LIMA to LAD anastomosis site, SVG graft to OM is occluded, SVG graft to RCA 40-50%, LAD occluded proximally, severe proximal LCx, RCA is the long . · PET (6/2019) EF 24% with anterior lateral and inferior reversible defect  Cardiac risk factors  · HTN  · HL  · DM2  · SRUTHI on CPAP  · MIld carotid stenosis  Anemia, microcytic  · Iron deficiency  DVT with filter  Pulmonary nodules         CARDIAC IMAGING:  Echo (5/20/21)  · LV: Estimated LVEF is 20 - 25%. Normal wall thickness. Mildly dilated left ventricle. Severely and globally reduced systolic function. Severe (grade 4) left ventricular diastolic dysfunction. · LA: Severely dilated left atrium. · RA: Severely dilated right atrium. · MV: Moderate mitral valve regurgitation is present. · TV: Moderate tricuspid valve regurgitation is present. · AO: Mild aortic root dilatation. · RV: Pacer/ICD present. · LVED 6.2cm  EKG (5/20/21) SB with 1degree AV block, QRS 116ms    [facility] 5/24/21 Native Coronaries: LM - moderate to severe distal disease 50%. % prox occluded (), heavily calcified, LCx: 80% proximal stenosis. OM1 is  (seen filling faintly via collaterals). OM2 99% stenosis  RCA: 100% proximal occluded.   LIMA to LAD: patent, supplies only a diagonal branch (probably D2). The LAD distal to the bifurcation is 100% occluded () and fills via right to left collaterals off the SVG to   RCA.   SVG to R-PDA: patent with moderate diffuse irregularities but no obstructive disease in the graft.    No appealing interventional targets.      NST as above    ICD interrogation (5/12/21) [employer] single lead, no events, normal device function and good battery     HEMODYNAMICS:  RHC 5/24/21 CI 1.97, PA 33/9/17, RA 1, PCWP 6- off milrinone   CPEST not done  6MW ordered     OTHER IMAGING:  CXR (6/17/21) clear  CT 5/21/21 1. Irregular pulmonary nodule in the left upper lobe measuring 2 cm, suspicious  for primary pulmonary malignancy. 2. Additional 6 mm left upper lobe pulmonary nodule. 3. Severe calcific atherosclerosis of the coronary arteries and abdominal aorta. No aneurysm. 4. Cardiomegaly. 5. No acute abnormality within the chest, abdomen, or pelvis.     HISTORY OF PRESENT ILLNESS:  I had the pleasure of seeing Heron Oconnell Sr in [facility] at [address] [place] [address] in [address] [place] [address] a 77 y. o. male with h/o HTN, HL, DM2, SRUTHI on CPAP, chronic systolic heart failure, stage D, NYHA class IV symptoms, non-ischemic cardiomyopathy, LVEF 20% with LVEDD 6.2, RV dysfunciton, TAPSE 1.22, TBili 1.5 likely from cardiac congestion, recent cardiac arrest s/p ICD (1/2013, [employer] followed by [facility]), coronary artery disease s/p multiple interventions s/p 4V CABG (8/2012), LHC (7/2019) severe stenosis of LIMA to LAD anastomosis site, SVG graft to OM is occluded, SVG graft to RCA 40-50%, LAD occluded proximally, severe proximal LCx, RCA is the long . PET (6/2019) EF 24% with anterior lateral and inferior reversible defect. Anemia, microcytic with iron deficiency, DVT with filter.     Patient was referred to A[facility] Clinic by Dr. Chitra Gross for evaluation of options re: management of advanced heart failure symptoms    Patient presents to ED this morning with complaint of HA and chest pain that had a sudden onset, however at time of exam symptoms have resolved. EKG is unchanged but his troponin was elevated to 10.6. Cardiology and [facility] consulted, will observe for 24 hours.      PHYSICAL EXAM:  Visit Vitals  BP (!) 146/97   Pulse 85   Temp 98.1 °F (36.7 °C)   Resp (!) 36   Ht 6' 1\" (1.854 m)   Wt 195 lb 5.2 oz (88.6 kg)   SpO2 96%   BMI 25.77 kg/m²     General: Patient is well developed, well-nourished in no acute distress  HEENT: Normocephalic and atraumatic. No scleral icterus. Pupils are equal, round and reactive to light and accomodation. No conjunctival injection. Oropharynx is clear. Neck: Supple. No evidence of thyroid enlargements or lymphadenopathy. JVD: Cannot be appreciated   Lungs: Breath sounds are equal and clear bilaterally. No wheezes, rhonchi, or rales. Heart: Regular rate and rhythm with normal S1 and S2. No murmurs, gallops or rubs. Abdomen: Soft, no mass or tenderness. No organomegaly or hernia. Bowel sounds present. Genitourinary and rectal: deferred  Extremities: No cyanosis, clubbing, or edema. Neurologic: No focal sensory or motor deficits are noted. Grossly intact. Psychiatric: Awake, alert an doriented x 3. Appropriate mood and affect. Skin: Warm, dry and well perfused. No lesions, nodules or rashes are noted. REVIEW OF SYSTEMS:  General: Denies fever, night sweats. Ear, nose and throat: Denies difficulty hearing, sinus problems, runny nose, post-nasal drip, ringing in ears, mouth sores, loose teeth, ear pain, nosebleeds, sore throate, facial pain or numbess  Cardiovascular: see above in the interval history  Respiratory: Denies cough, wheezing, sputum production, hemoptysis.   Gastrointestinal: Denies heartburn, constipation, intolerance to certain foods, diarrhea, abdominal pain, nausea, vomiting, difficulty swallowing, blood in stool  Kidney and bladder: Denies painful urination, frequent urination, urgency, prostate problems and impotence  Musculoskeletal: Denies joint pain, muscle weakness  Skin and hair: Denies change in existing skin lesions, hair loss or increase, breast changes    PAST MEDICAL HISTORY:  Past Medical History:   Diagnosis Date    CAD (coronary artery disease) '90 '97, '13 x 2    MI, code ice in 2013 at [facility]    Calculus of kidney     Colonic polyps     Congestive heart failure, unspecified     Diabetes ([facility] [address]     Gastritis     Hypercholesterolemia     Sleep apnea        PAST SURGICAL HISTORY:  Past Surgical History:   Procedure Laterality Date    COLONOSCOPY  04/06/2011    16, due 21    ENDOSCOPY, COLON, DIAGNOSTIC      11, due 16    HX CORONARY ARTERY BYPASS GRAFT  8/22/12    x 4 vessel by MISSY Ramierz    HX HEENT      LASIK    HX PACEMAKER PLACEMENT  1/30/13    IN CARDIAC SURG PROCEDURE UNLIST  2012    x 4 vessels       FAMILY HISTORY:  Family History   Problem Relation Age of Onset    Heart Disease Mother         MI    Heart Disease Father         CAD & PVD    Lung Disease Father     Cancer Father         lung    Diabetes Maternal Grandmother     Heart Disease Other         CAD    Stroke Sister        SOCIAL HISTORY:  Social History     Socioeconomic History    Marital status:      Spouse name: Not on file    Number of children: Not on file    Years of education: Not on file    Highest education level: Not on file   Tobacco Use    Smoking status: Never Smoker    Smokeless tobacco: Never Used   Substance and Sexual Activity    Alcohol use: Yes     Alcohol/week: 0.0 standard drinks     Comment:  VERY RARE    Drug use: No     Social Determinants of Health     Financial Resource Strain:     Difficulty of Paying Living Expenses:    Food Insecurity:     Worried About Running Out of Food in the Last Year:     [address] in the Last Year:    Transportation Needs:     Lack of Transportation (Medical):      Lack of Transportation (Non-Medical):    Physical Activity:     Days of Exercise per Week:     Minutes of Exercise per Session:    Stress:     Feeling of Stress :    Social Connections:     Frequency of Communication with Friends and Family:     Frequency of Social Gatherings with Friends and Family:     Attends Bahai Services:  Active Member of Clubs or Organizations:     Attends Club or Organization Meetings:     Marital Status:        LABORATORY RESULTS:     Labs Latest Ref Rng & Units 5/26/2021 5/25/2021 5/24/2021 5/23/2021 5/23/2021 5/22/2021 5/22/2021   WBC 4.1 - 11.1 K/uL 4.4 4. 0(L) 3. 6(L) - 3. 0(L) - 2. 7(L)   RBC 4.10 - 5.70 M/uL 5.72(H) 5.52 5.78(H) - 5.51 - 4.62   Hemoglobin 12.1 - 17.0 g/dL 13.6 12.8 13.4 - 12.8 - 10. 8(L)   Hematocrit 36.6 - 50.3 % 43.2 41.4 43.7 - 40.9 - 34. 3(L)   MCV 80.0 - 99.0 FL 75. 5(L) 75. 0(L) 75. 6(L) - 74. 2(L) - 74. 2(L)   Platelets 192 - 521 K/uL 160 158 152 - 135(L) - 109(L)   Lymphocytes 12 - 49 % 19 - - - - - -   Monocytes 5 - 13 % 11 - - - - - -   Eosinophils 0 - 7 % 1 - - - - - -   Basophils 0 - 1 % 1 - - - - - -   Albumin 3.5 - 5.0 g/dL 3.6 3. 2(L) 3.4(L) - 3. 4(L) - 3. 1(L)   Calcium 8.5 - 10.1 MG/DL 9.6 8.9 9.6 9.1 9.2 9.2 8.7   Glucose 65 - 100 mg/dL 281(H) 177(H) 125(H) 187(H) 128(H) 188(H) 194(H)   BUN 6 - 20 MG/DL 12 13 16 14 15 15 15   Creatinine 0.70 - 1.30 MG/DL 1.00 0.97 1.10 1.13 1.01 1.23 1.15   Sodium 136 - 145 mmol/L 132(L) 134(L) 135(L) 135(L) 135(L) 138 137   Potassium 3.5 - 5.1 mmol/L 4.4 4.7 4.1 4.1 3.1(L) 3.7 3. 1(L)   TSH 0.36 - 3.74 uIU/mL 1.47 - - - - - -   PSA 0.01 - 4.0 ng/mL - - - - - - -   LDH 85 - 241 U/L - - - - - - -   Some recent data might be hidden     Lab Results   Component Value Date/Time    TSH 1.47 05/26/2021 10:44 PM    TSH 1.97 05/20/2021 04:28 PM    TSH 1.41 02/09/2021 03:05 PM    TSH 1.740 08/14/2018 09:58 AM    TSH 1.710 10/18/2017 12:00 AM       ALLERGY:  Allergies   Allergen Reactions    Pcn [Penicillins] Hives        CURRENT MEDICATIONS:    Current Facility-Administered Medications:     aspirin delayed-release tablet 81 mg, 81 mg, Oral, DAILY, Karri Matthews MD, 81 mg at 05/27/21 0932    bumetanide (BUMEX) tablet 1 mg, 1 mg, Oral, EVERY OTHER DAY, Karri Matthews MD, 1 mg at 05/27/21 0449    eplerenone (INSPRA) tablet 50 mg, 50 mg, Oral, DAILY, Elizabeth Prado MD, 50 mg at 05/27/21 0932    hydrALAZINE (APRESOLINE) tablet 50 mg, 50 mg, Oral, TID, Karri Matthews MD, 50 mg at 05/27/21 0932    isosorbide mononitrate ER (IMDUR) tablet 30 mg, 30 mg, Oral, Q12H, Karri Matthews MD, 30 mg at 05/27/21 0932    magnesium oxide (MAG-OX) tablet 400 mg, 400 mg, Oral, DAILY, Karri Matthews MD, 400 mg at 05/27/21 0932    milrinone (PRIMACOR) 20 MG/100 ML D5W infusion, 0.3 mcg/kg/min, IntraVENous, TITRATE, Karri Matthews MD, Last Rate: 8 mL/hr at 05/27/21 0515, 0.3 mcg/kg/min at 05/27/21 0515    tamsulosin (FLOMAX) capsule 0.4 mg, 0.4 mg, Oral, DAILY, Karri Matthews MD, 0.4 mg at 05/27/21 0932    rosuvastatin (CRESTOR) tablet 10 mg, 10 mg, Oral, QHS, Karri Matthews MD, 10 mg at 05/27/21 0449    potassium chloride SR (KLOR-CON 10) tablet 40 mEq, 40 mEq, Oral, BID, Karri Matthews MD, 40 mEq at 05/27/21 0932    sodium chloride (NS) flush 5-40 mL, 5-40 mL, IntraVENous, Q8H, Karri Matthews MD, 10 mL at 05/27/21 0939    sodium chloride (NS) flush 5-40 mL, 5-40 mL, IntraVENous, PRN, Karri Matthews MD    acetaminophen (TYLENOL) tablet 650 mg, 650 mg, Oral, Q6H PRN **OR** acetaminophen (TYLENOL) suppository 650 mg, 650 mg, Rectal, Q6H PRN, Karri Matthews MD    polyethylene glycol (MIRALAX) packet 17 g, 17 g, Oral, DAILY PRN, Karri Matthews MD    prochlorperazine (COMPAZINE) with saline injection 5 mg, 5 mg, IntraVENous, Q6H PRN, Karri Matthews MD    insulin lispro (HUMALOG) injection, , SubCUTAneous, AC&HS, Karri Matthews MD    glucose chewable tablet 16 g, 4 Tablet, Oral, PRN, Karri Matthews MD    dextrose (D50W) injection syrg 12.5-25 g, 12.5-25 g, IntraVENous, PRN, Karri Matthews MD    glucagon (GLUCAGEN) injection 1 mg, 1 mg, IntraMUSCular, PRN, Karri Matthews MD    heparin 25,000 units in D5W 250 ml infusion, 11-25 Units/kg/hr, IntraVENous, TITRATE, Karri Matthews MD, Last Rate: 9.7 mL/hr at 05/27/21 1148, 11 Units/kg/hr at 05/27/21 1148    Current Outpatient Medications:     eplerenone (INSPRA) 50 mg tab tablet, Take 1 Tablet by mouth daily. , Disp: 90 Tablet, Rfl: 1    isosorbide mononitrate ER (IMDUR) 30 mg tablet, Take 1 Tablet by mouth every twelve (12) hours. , Disp: 180 Tablet, Rfl: 1    [START ON 5/28/2021] ergocalciferol (ERGOCALCIFEROL) 1,250 mcg (50,000 unit) capsule, Take 1 Capsule by mouth every seven (7) days for 11 doses. , Disp: 11 Capsule, Rfl: 0    hydrALAZINE (APRESOLINE) 50 mg tablet, Take 1 Tablet by mouth three (3) times daily. , Disp: 180 Tablet, Rfl: 2    magnesium oxide (MAG-OX) 400 mg tablet, Take 1 Tablet by mouth daily. , Disp: 90 Tablet, Rfl: 1    milrinone (PRIMACOR) 20 mg/100 mL (200 mcg/mL) infusion, 26.58 mcg/min by IntraVENous route continuous. , Disp: 100 mL, Rfl: 0    potassium chloride SR (K-TAB) 20 mEq tablet, Take 2 Tablets by mouth two (2) times a day., Disp: 240 Tablet, Rfl: 1    ondansetron hcl (ZOFRAN) 4 mg tablet, Take 1 Tab by mouth every eight (8) hours as needed for Nausea, Vomiting or Nausea or Vomiting., Disp: 20 Tab, Rfl: 1    rosuvastatin (CRESTOR) 10 mg tablet, TAKE 1 TABLET NIGHTLY, Disp: 90 Tab, Rfl: 3    ferrous sulfate (Iron) 325 mg (65 mg iron) tablet, Take  by mouth Daily (before breakfast). (Patient not taking: Reported on 5/20/2021), Disp: , Rfl:     omeprazole (PRILOSEC) 20 mg capsule, Take 1 Cap by mouth daily.  Indications: indigestion (Patient not taking: Reported on 5/26/2021), Disp: 30 Cap, Rfl: 1    tamsulosin (FLOMAX) 0.4 mg capsule, TAKE 1 CAPSULE DAILY, Disp: 90 Cap, Rfl: 3    glipiZIDE SR (GLUCOTROL XL) 10 mg CR tablet, TAKE 1 TABLET DAILY (DUE FOR OFFICE VISIT PLEASE CALL OFFICE TO SCHEDULE) (Patient not taking: Reported on 5/26/2021), Disp: 30 Tab, Rfl: 0    glucose blood VI test strips (OneTouch Ultra Blue Test Strip) strip, Use to test blood sugar daily, Disp: 100 Strip, Rfl: 11    lancets misc, Use to test blood sugar daily, Disp: 100 Each, Rfl: 11    bumetanide (BUMEX) 2 mg tablet, Take  by mouth. 1/2 tab every other day., Disp: , Rfl: 0    acetaminophen (TYLENOL EXTRA STRENGTH) 500 mg tablet, Take  by mouth every six (6) hours as needed. , Disp: , Rfl:     aspirin delayed-release 81 mg tablet, Take 81 mg by mouth daily. , Disp: , Rfl:     PATIENT CARE TEAM:  Patient Care Team:  Jose Maria Barakat MD as PCP - General  Jose Maria Barakat MD as PCP - [facility] Provider  Solange Wright MD as Physician (Cardiology)  Katherine Harley MD as Physician (Cardiology)  David Remy MD as Physician (Gastroenterology)  Winston Cardoza MD as Physician (Orthopedic Surgery)  Radha Joseph MD as Physician (Ophthalmology)  Randon Albino Wilms, MD as Physician ([address])  Hieu Sanchez MD (Cardiology)  Noelle Metzger MD (Cardiology)  Tez Vásquez, RN as Care Transitions Nurse     Thank you for allowing me to participate in this patient's care. Tram Mcdonald, EARNESTINE  [facility] [address] [facility]  [address] [facility], [address]  Phone: [phone]    [facility] ATTENDING ADDENDUM    Patient was seen and examined in person. Data and notes were reviewed. I have discussed and agree with the plan as noted in the NP note above without further additions.     Walt Spears MD PhD  Chacha Mendosa

## 2021-05-27 NOTE — H&P
[address]  HISTORY AND PHYSICAL    Name:  Syd Durham  MR#:  [id number]  :  1945  ACCOUNT #:  [de-identified]  ADMIT DATE:  2021      The patient was seen, evaluated, and admitted by me on 2021. PRIMARY CARE PHYSICIAN:  Latricia Ramsey MD    SOURCE OF INFORMATION:  Patient and review of ED and old electronic medical records. CHIEF COMPLAINT:  Chest pain. HISTORY OF PRESENT ILLNESS:  This is a 30-year-old man with a past medical history significant for coronary artery disease, status post CABG, hypertension, dyslipidemia, type 2 diabetes, obstructive sleep apnea, chronic systolic congestive heart failure, nonischemic cardiomyopathy, status post AICD placement, who was in his usual state of health until the day of presentation at the emergency room when the patient developed chest pain. The chest pain started 1 hour before coming to the emergency room. The pain is constant, located at the center of the chest with no radiation. No known aggravating or relieving factors. No associated nausea or vomiting. No diaphoresis. The patient described the pain as pressure-like, 5/10 in severity. When the patient arrived at the emergency room, the patient's initial troponin level was elevated. He was recently admitted to this hospital from 2021 to 2021. The patient was admitted by the Heart Failure team for optimization of treatment of his congestive heart failure. The patient was also started on milrinone drip. Evaluation for LVAD placement was also initiated. The patient was discharged home in stable condition. When the patient arrived at the emergency room, a CTA of the chest was obtained. This was negative for pulmonary embolism. The patient was subsequently referred to the hospitalist service for evaluation for admission.     PAST MEDICAL HISTORY:  Coronary artery disease, status post CABG, hypertension, dyslipidemia, type 2 diabetes, obstructive sleep apnea, chronic systolic congestive heart failure, and nonischemic cardiomyopathy. ALLERGIES:  THE PATIENT IS ALLERGIC TO PENICILLIN. MEDICATIONS:  Tylenol Extra Strength 500 mg every 6 hours as needed for pain, aspirin 81 mg daily, Bumex 1 mg every other day, Inspra 50 mg daily, ferrous sulfate 325 mg daily, Glucotrol XL 10 mg daily, hydralazine 50 mg 3 times daily, Imdur 30 mg every 12 hours, magnesium oxide 400 mg daily, milrinone drip as directed, Prilosec 20 mg daily, Zofran 4 mg every 8 hours as needed for nausea and vomiting, potassium chloride 20 mEq daily, Crestor 10 mg daily at bedtime, and Flomax 0.4 mg daily. FAMILY HISTORY:  This was reviewed. His father had heart disease and lung cancer. His mother had heart disease. PAST SURGICAL HISTORY:  This is significant for pacemaker insertion and CABG. SOCIAL HISTORY:  No history of alcohol or tobacco abuse. REVIEW OF SYSTEMS:  HEAD, EYES, EARS, NOSE, AND THROAT:  No headache, no dizziness, no blurring of vision, no photophobia. RESPIRATORY:  No cough, no shortness of breath, no hemoptysis. CARDIOVASCULAR:  This is positive for chest pain. No orthopnea, no palpitation. GASTROINTESTINAL:  No nausea or vomiting. No diarrhea. No constipation. GENITOURINARY:  No dysuria, no urgency, and no frequency. All other systems are reviewed and they are negative. PHYSICAL EXAMINATION:  GENERAL APPEARANCE:  The patient appeared ill, in moderate distress. VITAL SIGNS:  On arrival at the emergency room, temperature 98.1, pulse 106, respiratory rate 19, blood pressure 136/85, and oxygen saturation 98% on room air. HEAD:  Normocephalic, atraumatic. EYES:  Normal eye movement. No redness, no drainage, no discharge. EARS:  Normal external ears with no evidence of drainage. NOSE:  No deformity and no drainage. MOUTH AND THROAT:  No visible oral lesion. NECK:  Neck is supple. No JVD, no thyromegaly.   CHEST:  Clear breath sounds. No wheezing, no crackles. HEART:  Normal S1 and S2, regular. No clinically appreciable murmur. ABDOMEN:  Soft, nontender. Normal bowel sounds. CENTRAL NERVOUS SYSTEM:  Alert and oriented x3. No gross focal neurological deficit. EXTREMITIES:  No edema. Pulses 2+ bilaterally. MUSCULOSKELETAL:  No evidence of joint deformity or swelling. SKIN:  No active skin lesions seen in the exposed parts of the body. PSYCHIATRIC:  Normal mood and affect. LYMPHATIC:  No cervical lymphadenopathy. DIAGNOSTIC DATA:  EKG shows sinus tachycardia and nonspecific ST and T-wave abnormalities. Chest x-ray shows minimal central pulmonary vascular congestion without overt pulmonary edema. CTA of the chest, no acute pulmonary embolism, lung nodules noted. LABORATORY DATA:  Hematology, WBC 4.4, hemoglobin 13.6, hematocrit 43.3, platelets 844. Coagulation profile, INR 1.1, PT 11.4. D-dimer 1.86. Magnesium 2.3. Pro-BNP level 2455. Chemistry, sodium 132, potassium 4.4, chloride 102, CO2 of 21, glucose 281, BUN 12, creatinine 1.0, calcium 9.6. Total bilirubin 1.4, ALT 29, AST 96, alkaline phosphatase 240, total protein 8.0, albumin level 3.6, globulin 4.4. Cardiac profile, troponin less than 0.09. Lipase level 320. TSH 1.47.    ASSESSMENT:  1. Suspected non-ST-elevation myocardial infarction. 2.  Hypertension. 3.  Dyslipidemia. 4.  Type 2 diabetes with hyperglycemia. 5.  Obstructive sleep apnea. 6.  Chronic systolic congestive heart failure. 7.  Nonischemic cardiomyopathy, status post automatic implantable cardioverter defibrillator placement. 8.  Pulmonary nodules. 9.  Elevated D-dimer. 10.  Hyponatremia. PLAN:  1. Suspected non-ST-elevation myocardial infarction. We will admit the patient for further evaluation and treatment. We will start the patient on full-dose heparin. We will continue with aspirin. Cardiology consult will be requested to assist in further evaluation and treatment.   We will continue treatment for suspected non-ST-elevation myocardial infarction until when the patient is seen by cardiologist to determine whether the patient has non-ST-elevation myocardial infarction or not. We will trend troponin level. CTA of the chest is negative for pulmonary embolism. We will check lipase level to evaluate the patient for other atypical causes of chest pain. 2.  Hypertension. We will resume preadmission medication. We will monitor the patient's blood pressure closely. 3.  Dyslipidemia. We will continue with preadmission medication. 4.  Type 2 diabetes with hyperglycemia. The patient will be placed on sliding scale with insulin coverage. We will check hemoglobin A1c level if one has not been done recently. 5.  Obstructive sleep apnea. We will place the patient on CPAP. 6.  Chronic systolic congestive heart failure. We will continue with preadmission medication including the diuretic and await further recommendation from the cardiologist.  7.  Nonischemic cardiomyopathy, status post AICD placement. The patient is being considered for LVAD. We will await further recommendation from the cardiologist.  8.  Pulmonary nodules. The patient is supposed to have outpatient evaluation by the pulmonologist.  The patient may require evaluation by pulmonologist during this hospitalization. 9.  Elevated D-dimer. We will check ultrasound of the lower extremity for DVT. CTA of the chest is negative. 10.  Hyponatremia. This is most likely due to chronic congestive heart failure. This is mild. We will continue to monitor the patient's sodium level. 11.  Other issues:  Code status, the patient is a full code. The patient is on full-dose heparin. Because of that, there is no need for DVT prophylaxis. FUNCTIONAL STATUS PRIOR TO ADMISSION:  The patient came from home. The patient is ambulatory with no assistant or device. COVID PRECAUTION:  The patient was wearing a face mask.   I was wearing a face mask and gloves for this patient's encounter. Edilson Dooley MD      [id number]  D:  05/27/2021 6:03  T:  05/27/2021 7:18  JOB #:  [id number]  CC:   Isabell Odom MD

## 2021-05-27 NOTE — NURSE NAVIGATOR
Chart reviewed by Heart Failure Nurse Navigator. Heart Failure database completed. EF:  20/25%, echo done 5/20/21    ACEi/ARB/ARNi: held currently in anticipation of cardiac surgery    BB: held d/t RV dysfunction    Aldosterone Antagonist: eplerenone 50 mg daily    Obstructive Sleep Apnea Screening: Yes, on cpap per hospitalist   STOP-BANG score:   Referred to Sleep Medicine:     CRT: ICD, implanted. NYHA Functional Class IV on admission. Heart Failure Teach Back in Patient Education. Heart Failure Avoiding Triggers on Discharge Instructions. Cardiologist: Dr. Rosanna Oseguera ([facility]); Dr. Jeri Cintron [facility])      Post discharge follow up phone call to be made within 48-72 hours of discharge. 30 day HF readmission:    Prior admission 5/20-5/25/21; final coding: Hypertensive heart disease with heart failure  Discharging Unit: CVSU (No SMAART-E discharge done as patient was discharged on home inotrope, nonhospitalist attending)    Home Health was set up with [facility] for home inotrope infusion, skilled nursing. Patient had PCP follow up appointment scheduled with Dr. Anda Gowers 5/27/21 at 2:00 PM; [facility] follow up appointment scheduled with Dr. Jeri Cintron 6/1/21 at 10:30 AM.  Both appointments were documented on printed Discharge Instructions given to patient. Patient received post discharge follow up phone call by KAUSHAL Albright on 5/26/21 at 1:55 PM.  The only issue identified after speaking with patient was question regarding a diabetes medication that patient thought had been discontinued prior to hospitalization but was on Discharge medication list.    CTN identified risk factors for readmission: medical condition; plan for LVAD work up. CTN advised patient's wife to call home health company to ask when they are coming out.     On 5/26/21, patient began experiencing chest pain around 5 PM and presented to ED around 10 PM. Initial tropin 0.09, repeat troponin at 0528 this morning up to 10.40. NT pro-BNP is elevated, but less than NT pro-BNP on day of previous discharge. Patient had no complaint of shortness of breath. Patient seen by Dr. Maria C Shepherd, cardiology, and diagnosed with NSTEMI, chronic systolic heart failure NYHA class IV. Advanced Heart Failure is also consulted and will proceed with LVAD work up. Patient is inpatient in ED16, not yet assigned bed.

## 2021-05-28 ENCOUNTER — DOCUMENTATION ONLY (OUTPATIENT)
Dept: CARDIOLOGY CLINIC | Age: 76
End: 2021-05-28

## 2021-05-28 ENCOUNTER — APPOINTMENT (OUTPATIENT)
Dept: VASCULAR SURGERY | Age: 76
DRG: 281 | End: 2021-05-28
Attending: HOSPITALIST
Payer: MEDICARE

## 2021-05-28 ENCOUNTER — TELEPHONE (OUTPATIENT)
Dept: CARDIOLOGY CLINIC | Age: 76
End: 2021-05-28

## 2021-05-28 VITALS
WEIGHT: 192.02 LBS | HEIGHT: 73 IN | OXYGEN SATURATION: 98 % | TEMPERATURE: 98.2 F | SYSTOLIC BLOOD PRESSURE: 139 MMHG | RESPIRATION RATE: 16 BRPM | DIASTOLIC BLOOD PRESSURE: 89 MMHG | HEART RATE: 112 BPM | BODY MASS INDEX: 25.45 KG/M2

## 2021-05-28 LAB
ALBUMIN SERPL-MCNC: 3.3 G/DL (ref 3.5–5)
ALBUMIN/GLOB SERPL: 0.8 {RATIO} (ref 1.1–2.2)
ALP SERPL-CCNC: 227 U/L (ref 45–117)
ALT SERPL-CCNC: 40 U/L (ref 12–78)
ANION GAP SERPL CALC-SCNC: 7 MMOL/L (ref 5–15)
APTT PPP: 118.8 SEC (ref 22.1–31)
APTT PPP: 46.3 SEC (ref 22.1–31)
AST SERPL-CCNC: 172 U/L (ref 15–37)
BASOPHILS # BLD: 0 K/UL (ref 0–0.1)
BASOPHILS NFR BLD: 1 % (ref 0–1)
BILIRUB SERPL-MCNC: 1.1 MG/DL (ref 0.2–1)
BNP SERPL-MCNC: 3531 PG/ML
BUN SERPL-MCNC: 14 MG/DL (ref 6–20)
BUN/CREAT SERPL: 16 (ref 12–20)
CALCIUM SERPL-MCNC: 9.2 MG/DL (ref 8.5–10.1)
CHLORIDE SERPL-SCNC: 102 MMOL/L (ref 97–108)
CO2 SERPL-SCNC: 24 MMOL/L (ref 21–32)
CREAT SERPL-MCNC: 0.9 MG/DL (ref 0.7–1.3)
DIFFERENTIAL METHOD BLD: ABNORMAL
EOSINOPHIL # BLD: 0.1 K/UL (ref 0–0.4)
EOSINOPHIL NFR BLD: 3 % (ref 0–7)
ERYTHROCYTE [DISTWIDTH] IN BLOOD BY AUTOMATED COUNT: 20.5 % (ref 11.5–14.5)
GLOBULIN SER CALC-MCNC: 4 G/DL (ref 2–4)
GLUCOSE BLD STRIP.AUTO-MCNC: 191 MG/DL (ref 65–117)
GLUCOSE SERPL-MCNC: 217 MG/DL (ref 65–100)
HCT VFR BLD AUTO: 41.8 % (ref 36.6–50.3)
HGB BLD-MCNC: 12.9 G/DL (ref 12.1–17)
IMM GRANULOCYTES # BLD AUTO: 0 K/UL (ref 0–0.04)
IMM GRANULOCYTES NFR BLD AUTO: 0 % (ref 0–0.5)
LYMPHOCYTES # BLD: 1.2 K/UL (ref 0.8–3.5)
LYMPHOCYTES NFR BLD: 35 % (ref 12–49)
MAGNESIUM SERPL-MCNC: 2.3 MG/DL (ref 1.6–2.4)
MCH RBC QN AUTO: 23.5 PG (ref 26–34)
MCHC RBC AUTO-ENTMCNC: 30.9 G/DL (ref 30–36.5)
MCV RBC AUTO: 76.1 FL (ref 80–99)
MONOCYTES # BLD: 0.5 K/UL (ref 0–1)
MONOCYTES NFR BLD: 16 % (ref 5–13)
NEUTS SEG # BLD: 1.6 K/UL (ref 1.8–8)
NEUTS SEG NFR BLD: 45 % (ref 32–75)
NRBC # BLD: 0 K/UL (ref 0–0.01)
NRBC BLD-RTO: 0 PER 100 WBC
PLATELET # BLD AUTO: 165 K/UL (ref 150–400)
POTASSIUM SERPL-SCNC: 4.2 MMOL/L (ref 3.5–5.1)
PROT SERPL-MCNC: 7.3 G/DL (ref 6.4–8.2)
RBC # BLD AUTO: 5.49 M/UL (ref 4.1–5.7)
RBC MORPH BLD: ABNORMAL
SERVICE CMNT-IMP: ABNORMAL
SODIUM SERPL-SCNC: 133 MMOL/L (ref 136–145)
THERAPEUTIC RANGE,PTTT: ABNORMAL SECS (ref 58–77)
THERAPEUTIC RANGE,PTTT: ABNORMAL SECS (ref 58–77)
TROPONIN I SERPL-MCNC: 3.65 NG/ML
WBC # BLD AUTO: 3.4 K/UL (ref 4.1–11.1)

## 2021-05-28 PROCEDURE — 84484 ASSAY OF TROPONIN QUANT: CPT

## 2021-05-28 PROCEDURE — 82962 GLUCOSE BLOOD TEST: CPT

## 2021-05-28 PROCEDURE — 74011250637 HC RX REV CODE- 250/637: Performed by: HOSPITALIST

## 2021-05-28 PROCEDURE — 85730 THROMBOPLASTIN TIME PARTIAL: CPT

## 2021-05-28 PROCEDURE — 3C1ZX8Z IRRIGATION OF INDWELLING DEVICE USING IRRIGATING SUBSTANCE, EXTERNAL APPROACH: ICD-10-PCS | Performed by: INTERNAL MEDICINE

## 2021-05-28 PROCEDURE — 74011250637 HC RX REV CODE- 250/637: Performed by: NURSE PRACTITIONER

## 2021-05-28 PROCEDURE — 83880 ASSAY OF NATRIURETIC PEPTIDE: CPT

## 2021-05-28 PROCEDURE — 80053 COMPREHEN METABOLIC PANEL: CPT

## 2021-05-28 PROCEDURE — 94010 BREATHING CAPACITY TEST: CPT

## 2021-05-28 PROCEDURE — 97161 PT EVAL LOW COMPLEX 20 MIN: CPT

## 2021-05-28 PROCEDURE — 97116 GAIT TRAINING THERAPY: CPT

## 2021-05-28 PROCEDURE — 36415 COLL VENOUS BLD VENIPUNCTURE: CPT

## 2021-05-28 PROCEDURE — 94729 DIFFUSING CAPACITY: CPT

## 2021-05-28 PROCEDURE — 74011250636 HC RX REV CODE- 250/636: Performed by: INTERNAL MEDICINE

## 2021-05-28 PROCEDURE — 85025 COMPLETE CBC W/AUTO DIFF WBC: CPT

## 2021-05-28 PROCEDURE — 93971 EXTREMITY STUDY: CPT

## 2021-05-28 PROCEDURE — 36592 COLLECT BLOOD FROM PICC: CPT

## 2021-05-28 PROCEDURE — 99233 SBSQ HOSP IP/OBS HIGH 50: CPT | Performed by: INTERNAL MEDICINE

## 2021-05-28 PROCEDURE — 99232 SBSQ HOSP IP/OBS MODERATE 35: CPT | Performed by: INTERNAL MEDICINE

## 2021-05-28 PROCEDURE — 74011250637 HC RX REV CODE- 250/637: Performed by: INTERNAL MEDICINE

## 2021-05-28 PROCEDURE — 83735 ASSAY OF MAGNESIUM: CPT

## 2021-05-28 PROCEDURE — 74011636637 HC RX REV CODE- 636/637: Performed by: INTERNAL MEDICINE

## 2021-05-28 PROCEDURE — 36593 DECLOT VASCULAR DEVICE: CPT

## 2021-05-28 PROCEDURE — 94727 GAS DIL/WSHOT DETER LNG VOL: CPT

## 2021-05-28 RX ORDER — DIGOXIN 125 MCG
0.12 TABLET ORAL DAILY
Status: DISCONTINUED | OUTPATIENT
Start: 2021-05-28 | End: 2021-05-28

## 2021-05-28 RX ORDER — RANOLAZINE 500 MG/1
500 TABLET, EXTENDED RELEASE ORAL 2 TIMES DAILY
Qty: 60 TABLET | Refills: 0 | Status: SHIPPED | OUTPATIENT
Start: 2021-05-28 | End: 2021-06-27

## 2021-05-28 RX ORDER — RANOLAZINE 500 MG/1
500 TABLET, EXTENDED RELEASE ORAL 2 TIMES DAILY
Status: DISCONTINUED | OUTPATIENT
Start: 2021-05-28 | End: 2021-05-28 | Stop reason: HOSPADM

## 2021-05-28 RX ADMIN — HYDRALAZINE HYDROCHLORIDE 50 MG: 50 TABLET, FILM COATED ORAL at 18:30

## 2021-05-28 RX ADMIN — MILRINONE LACTATE IN DEXTROSE 0.3 MCG/KG/MIN: 200 INJECTION, SOLUTION INTRAVENOUS at 15:39

## 2021-05-28 RX ADMIN — Medication 10 ML: at 06:23

## 2021-05-28 RX ADMIN — ASPIRIN 81 MG: 81 TABLET, COATED ORAL at 09:42

## 2021-05-28 RX ADMIN — EPLERENONE 50 MG: 25 TABLET, FILM COATED ORAL at 09:42

## 2021-05-28 RX ADMIN — TAMSULOSIN HYDROCHLORIDE 0.4 MG: 0.4 CAPSULE ORAL at 09:42

## 2021-05-28 RX ADMIN — RANOLAZINE 500 MG: 500 TABLET, FILM COATED, EXTENDED RELEASE ORAL at 18:30

## 2021-05-28 RX ADMIN — MAGNESIUM GLUCONATE 500 MG ORAL TABLET 400 MG: 500 TABLET ORAL at 09:42

## 2021-05-28 RX ADMIN — POTASSIUM CHLORIDE 40 MEQ: 750 TABLET, EXTENDED RELEASE ORAL at 18:30

## 2021-05-28 RX ADMIN — ISOSORBIDE MONONITRATE 30 MG: 30 TABLET, EXTENDED RELEASE ORAL at 09:42

## 2021-05-28 RX ADMIN — BENZONATATE 100 MG: 100 CAPSULE ORAL at 03:56

## 2021-05-28 RX ADMIN — Medication 10 ML: at 15:39

## 2021-05-28 RX ADMIN — INSULIN LISPRO 2 UNITS: 100 INJECTION, SOLUTION INTRAVENOUS; SUBCUTANEOUS at 06:35

## 2021-05-28 RX ADMIN — POTASSIUM CHLORIDE 40 MEQ: 750 TABLET, EXTENDED RELEASE ORAL at 09:42

## 2021-05-28 RX ADMIN — MILRINONE LACTATE IN DEXTROSE 0.3 MCG/KG/MIN: 200 INJECTION, SOLUTION INTRAVENOUS at 03:56

## 2021-05-28 RX ADMIN — HYDRALAZINE HYDROCHLORIDE 50 MG: 50 TABLET, FILM COATED ORAL at 09:42

## 2021-05-28 NOTE — PROGRESS NOTES
Orders received, chart reviewed and patient evaluated by physical therapy. Pending progression with skilled acute physical therapy, recommend:  No skilled physical therapy/ follow up rehabilitation needs identified at this time. Recommend with nursing OOB to chair 3x/day and walking daily with assist X 1 assist, no assistive device . Thank you for completing as able in order to maintain patient strength, endurance and independence. Full evaluation to follow.  Silviano Gold, PT         Vitals:     05/28/21 1525 05/28/21 1527 05/28/21 1532    BP: 120/69 (!) 145/90 136/79 139/89    BP 1 Location: Left upper arm Left upper arm Left upper arm Left upper arm    BP Patient Position: supine Sitting Standing Sitting  Comment: post activity    Pulse: 103 (!) 105 (!) 107 (!) 112    Resp:        Temp:        SpO2: on room air 98 97% 98% 98%

## 2021-05-28 NOTE — PROGRESS NOTES
MARY: anticipate d/c home with [facility] [place] for Milrinone IV infusion and family support; [address] placed in AVS; Will need  RICKY orders faxed to [facility] once obtained at [phone] & IV Milrinone infusion orders will need to be uploaded to [facility] once obtained; Spouse to provide d/c transport; IMM letter provided 5/26    RUR: 14%  Reason for Readmission: chest pain; CHF; pt on milrinone IV infusion; pt is being evaluated for lvad             RUR Score/Risk Level:     14%    PCP: First and Last name:  Dr. Frances Jensen   Name of Practice:    Are you a current patient: Yes/No: YES   Approximate date of last visit: 2 weeks ago   Can you participate in a virtual visit with your PCP:   YES  Is a Care Conference indicated:   TBD  Did you attend your follow up appointment (s): If not, why not:  No, pt stated he had chest pain prior to admission and that what caused him to return to the hospital.       Resources/supports as identified by patient/family:        Spouse, other family members   Top Challenges facing patient (as identified by patient/family and CM): Finances/Medication cost?     Pt denies concerns with finances or medication costs. Transportation     Spouse provides transport    Support system or lack thereof? Spouse, other family members    Living arrangements? Resides with spouse       Self-care/ADLs/Cognition? Pt stated he is independent with adls, pt is AAO x 3. Current Advanced Directive/Advance Care Plan:           AMD on file   Plan for utilizing home health:              [facility] [place] [facility] for IV Milrinone infusion  Transition of Care Plan:    Based on readmission, the patient's previous Plan of Care   has been evaluated and/or modified.  The current Transition of Care Plan is:       D/c home with [facility] and [facility] infusion and family support; spouse to provide d/c transport  1050-CM reviewed pt chart & met with pt at bedside to discuss transitional planning. Pt resides with spouse and stated he is independent with adls, dme is: cpap, glucometer, cane, bp cuff and pt has milrinone infusion at home. [facility] for meds with no copay concerns. Pt expressed that he was having difficult understanding medication regimen last time he was discharged. He further stated that even his family was having difficulty understanding his medication regimen as well. CM informed nurse of this. CM confirmed pcp and demographics. Spouse to provide d/c transport. CM contacted [facility] [phone] and spoke with Yudith Mae who confirmed that they can accept pt for resumption of care [facility] services, they only saw pt one time on 5/26 prior to hospital admission. CM faxed clinicals to [phone], [facility] as requested. [facility] RICKY orders will be faxed once obtained. CM sent referral to [employer] to resume IV Milrinone infusion, referral accepted. [facility] providers added to AVS.CM was asked by Neelam Aponte for pt to provide new infusion from their own supply at d/c. CM informed pt and spouse to bring in new IV Milrinone bag. CM to follow. Calla Burkitt RN BSN CCM  Transition of Care Plan:     The Plan for Transition of Care is related to the following treatment goals: home health, iv infusion    The Patient and/or patient representative  was provided with a choice of provider and agrees  with the discharge plan. Yes [x] No []    A Freedom of choice list was provided with basic dialogue that supports the patient's individualized plan of care/goals and shares the quality data associated with the providers. Yes [x] No []  Care Management Interventions  PCP Verified by CM:  Yes  Palliative Care Criteria Met (RRAT>21 & CHF Dx)?: Yes  Palliative Consult Recommended?: Yes  Mode of Transport at Discharge:  (spouse )  Transition of Care Consult (CM Consult): Discharge Planning, [address]: No  Reason Outside [place]: Patient already serviced by other home care/hospice agency  Mery Signup: Yes  Discharge Durable Medical Equipment: No (pt owns a cpap machine, glucometer, cane and has milrinone infusion. )  Health Maintenance Reviewed: Yes  Current Support Network: Own Home, Lives with Spouse  Confirm Follow Up Transport: Family  The Plan for Transition of Care is Related to the Following Treatment Goals : medical and cardiac clearance   The Patient and/or Patient Representative was Provided with a Choice of Provider and Agrees with the Discharge Plan?: Yes  Name of the Patient Representative Who was Provided with a Choice of Provider and Agrees with the Discharge Plan: patient  Freedom of Choice List was Provided with Basic Dialogue that Supports the Patient's Individualized Plan of Care/Goals, Treatment Preferences and Shares the Quality Data Associated with the Providers?: Yes  Discharge Location  Discharge Placement: Home with home health      Readmission Assessment  Number of days since last admission?: 1-7 days  Previous disposition: Home with Home Health  Who is being interviewed?: Patient  What was the patient's/caregiver's perception as to why they think they needed to return back to the hospital?: Did not understand their medication regimen  Did you visit your Primary Care Physician after you left the hospital, before you returned this time?: No  Why weren't you able to visit your PCP?:  (pt stated he had chest pain, so he had to return to hospital before his doctor appts.)  Did you see a specialist, such as Cardiac, Pulmonary, Orthopedic Physician, etc. after you left the hospital?: No  Who advised the patient to return to the hospital?: Self-referral  Does the patient report anything that got in the way of taking their medications?: Yes (pt stated his medication regimen was very confusing and that he and his family could not comprehend all the changes to his medication regimen.)  What reasons did they give?: Did not understand instructions  In our efforts to provide the best possible care to you and others like you, can you think of anything that we could have done to help you after you left the hospital the first time, so that you might not have needed to return so soon?: Discharge instructions that are concise, clear, and non contradictory, Teaching during hospitalization regarding your illness, Teach back instructions regarding management of illness, Identify patient's health literacy needs, Improved written discharge instructions, Education on how to continue taking medications upon discharge  Sanam Bright RN BSN CCM

## 2021-05-28 NOTE — PROGRESS NOTES
PHYSICAL THERAPY EVALUATION/DISCHARGE  Patient: Therese Velazquez (41 y.o. male)  Date: 5/28/2021  Primary Diagnosis: NSTEMI (non-ST elevated myocardial infarction) ([facility] [I21.4]       Precautions:    (low fall risk per Tinetti and limb alert RUE)    ASSESSMENT  Based on the objective data described below, the patient presents with good mobility and some tachycardia with activity but with a quick recovery post amb. Based on eval and conversation with pt, he is a 5 on the Clinical Frailty Scale. Gait was stable, can clear him for discharge with no follow up PT recommended.                  Vitals:       05/28/21 1525 05/28/21 1527 05/28/21 1532     BP: 120/69 (!) 145/90 136/79 139/89     BP 1 Location: Left upper arm Left upper arm Left upper arm Left upper arm     BP Patient Position: supine Sitting Standing Sitting  Comment: post activity     Pulse: 103 (!) 105 (!) 107 (!) 112     Resp:             Temp:             SpO2: on room air 98 97% 98% 98%                         Current Level of Function Impacting Discharge (mobility/balance): independent     Functional Outcome Measure: The patient scored 28/28 on the Tinetti outcome measure which is indicative of low fall risk. .      Other factors to consider for discharge: DM, cardiac history and NSTEMI and heart failure with 20-25% EF     Patient will benefit from skilled therapy intervention to address the above noted impairments. PLAN :  Recommendations and Planned Interventions:    Frequency/Duration:will discharge from PT    Recommendation for discharge: (in order for the patient to meet his/her long term goals)  No skilled physical therapy/ follow up rehabilitation needs identified at this time.     This discharge recommendation:  A follow-up discussion with the attending provider and/or case management is planned    IF patient discharges home will need the following DME: none         SUBJECTIVE:   Patient stated that he has been up amb on the unit.    OBJECTIVE DATA SUMMARY:   Consult received, chart reviewed, pt cleared by nursing  HISTORY:    Past Medical History:   Diagnosis Date    CAD (coronary artery disease) '90 '97, '13 x 2    MI, code ice in 2013 at [facility]    Calculus of kidney     Colonic polyps     Congestive heart failure, unspecified     Diabetes ([facility]     Gastritis     Hypercholesterolemia     Sleep apnea      Past Surgical History:   Procedure Laterality Date    COLONOSCOPY  04/06/2011    16, due 21    ENDOSCOPY, COLON, DIAGNOSTIC      11, due 16    HX CORONARY ARTERY BYPASS GRAFT  8/22/12    x 4 vessel by MISSY Ramirez    HX HEENT      LASIK    HX PACEMAKER PLACEMENT  1/30/13    ND CARDIAC SURG PROCEDURE UNLIST  2012    x 4 vessels       Personal factors and/or comorbidities impacting plan of care: DM, MI    Home Situation  Home Environment: Private residence  # Steps to Enter: 1  One/Two Story Residence: Two story, live on 1st floor  Living Alone: Yes  Support Systems: Spouse/Significant Other/Partner, Friends \ neighbors, Child(omar)  Current DME Used/Available at Home: Blood pressure cuff, CPAP, Cane, straight (pulse ox, scales)    EXAMINATION/PRESENTATION/DECISION MAKING:   Critical Behavior:  Neurologic State: Alert  Orientation Level: Oriented X4  Cognition: Appropriate decision making, Appropriate safety awareness, Appropriate for age attention/concentration, Follows commands  Safety/Judgement: Awareness of environment, Good awareness of safety precautions, Insight into deficits  Hearing: Auditory  Auditory Impairment: None  Skin:  Refer to MD and nursing notes  Edema: none noed  Range Of Motion:  AROM: Within functional limits                       Strength:    Strength:  Within functional limits                    Tone & Sensation:                  Sensation: Impaired (intermittently hand and feet)               Coordination:     Vision:      Functional Mobility:  Bed Mobility:     Supine to Sit: Modified independent Transfers:  Sit to Stand: Independent  Stand to Sit: Independent                       Balance:   Sitting: Intact; Without support  Standing: Intact; Without support  Ambulation/Gait Training:  Distance (ft): 200 Feet (ft)  Assistive Device: Gait belt                    Base of Support: Narrowed                              Stairs: Therapeutic Exercises:       Functional Measure:  Tinetti test:    Sitting Balance: 1  Arises: 2  Attempts to Rise: 2  Immediate Standing Balance: 2  Standing Balance: 2  Nudged: 2  Eyes Closed: 1  Turn 360 Degrees - Continuous/Discontinuous: 1  Turn 360 Degrees - Steady/Unsteady: 1  Sitting Down: 2  Balance Score: 16 Balance total score  Indication of Gait: 1  R Step Length/Height: 1  L Step Length/Height: 1  R Foot Clearance: 1  L Foot Clearance: 1  Step Symmetry: 1  Step Continuity: 1  Path: 2  Trunk: 2  Walking Time: 1  Gait Score: 12 Gait total score  Total Score: 28/28 Overall total score         Tinetti Tool Score Risk of Falls  <19 = High Fall Risk  19-24 = Moderate Fall Risk  25-28 = Low Fall Risk  Tinetti ME. Performance-Oriented Assessment of Mobility Problems in Elderly Patients. [address]; [id number].  (Scoring Description: PT Bulletin Feb. 10, 1993)    Older adults: Amaury Oh et al, 2009; n = 1000 [facility] elderly evaluated with ABC, GERA, ADL, and IADL)  · Mean GERA score for males aged 69-68 years = 26.21(3.40)  · Mean GERA score for females age 69-68 years = 25.16(4.30)  · Mean GERA score for males over 80 years = 23.29(6.02)  · Mean GERA score for females over 80 years = 17.20(8.32)          Physical Therapy Evaluation Charge Determination   History Examination Presentation Decision-Making   HIGH Complexity :3+ comorbidities / personal factors will impact the outcome/ POC  MEDIUM Complexity : 3 Standardized tests and measures addressing body structure, function, activity limitation and / or participation in recreation  LOW Complexity : Stable, uncomplicated  LOW Complexity : FOTO score of       Based on the above components, the patient evaluation is determined to be of the following complexity level: LOW     Pain Rating:  None rated    Activity Tolerance:   Good    After treatment patient left in no apparent distress:   Sitting in chair and Call bell within reach    COMMUNICATION/EDUCATION:   The patients plan of care was discussed with: Occupational therapist and Registered nurse. Fall prevention education was provided and the patient/caregiver indicated understanding.     Thank you for this referral.  Vivien Bob   Time Calculation: 30 mins

## 2021-05-28 NOTE — PROGRESS NOTES
LVAD/TRANSPLANT [id number]   Date: 5/28/21   Committee Members:   Dr. Lloyd Woodward MD PhD (Medical Director), Dr. Aden Olsen MD (Surgical Director), TEO Card (Chief Nurse Practitioner), TEO Mojica ([facility] Nurse Practitioner), Lindsey Nguyen RN (VAD Coordinator), Alvin Fairchild RN (VAD Coordinator), MICHAEL Schilling ()   Criteria Met:   Chronic systolic heart failure due to ICM   Stage D, NYHA class IV symptoms, inotrope dependent    LVEF 20%   CI 2.0 off of inotropes ([facility] 5/24/21)    Absolute contraindications:   None.    Relative contraindications:   Lung nodule, bx pending    Frailty   Advanced age  Scheryl Gemma Unknown PFTs   Unknown source of NICA    Uncertain psychosocial situation   Decision:   Completion of evaluation, including:         - Review of lung biopsy results         - GI eval, including scopes        - PFTs        - Palliative Care Consult         - Dental evaluation        - MOCA, frailty testing         - Eye, foot exams         - LCSW eval      Defer final decision until completion of evaluation; will plan to present at [facility] on June 11th    -   Additional comments:  N/A   Prepared by:   Dawson Castano NP

## 2021-05-28 NOTE — TELEPHONE ENCOUNTER
HPI


Chief Complaint:  Psychiatric Symptoms


Time Seen by Provider:  12:54


Travel History


International Travel<30 days:  No


Contact w/Intl Traveler<30days:  No


Traveled to known affect area:  No





History of Present Illness


HPI


Patient is a 38-year-old female who presents the emergency room for psychiatric 

evaluation.  Patient reports that she is very depressed, reports that her son 

who lives with his father up [place] just got arrested.  Patient reports that she 

had a huge fight with her current boyfriend, reports that she is having a hard 

time at work, patient reports that she feels alone and is very depressed.  

Patient reports that last week, she took a knife and wanted to hurt herself - 

her boyfriend stopped her.  Reports that she is depressed and suicidal but 

really does not want to herself.  Patient reports that she just needs help.  

Patient does have an appointment with a psychiatrist in July, reports that she 

needs help sooner than that.  Denies use of drugs





PFSH


Past Medical History


Medical History:  Denies Significant Hx


Diminished Hearing:  No


Pregnant?:  Not Pregnant


:  2


Para:  2


Ovarian Cysts:  Yes ((L) OVARIAN CYSY AT AGE 19)





Past Surgical History


 Section:  Yes ()





Social History


Alcohol Use:  Yes (3 DRINKS WEEKLY)


Tobacco Use:  Yes ( PPD)


Substance Use:  No





Allergies-Medications


(Allergen,Severity, Reaction):  


Coded Allergies:  


     No Known Allergies (Verified  Allergy, Mild, 07)


Reported Meds & Prescriptions





Reported Meds & Active Scripts


Active


Reported


Multivitamin (Multivitamins) 1 Tab Tab    








Review of Systems


General / Constitutional:  No: Fever


Eyes:  No: Visual changes


HENT:  No: Headaches


Cardiovascular:  No: Chest Pain or Discomfort


Respiratory:  No: Shortness of Breath


Gastrointestinal:  No: Abdominal Pain


Genitourinary:  No: Dysuria


Musculoskeletal:  No: Pain


Skin:  No Rash


Neurologic:  No: Weakness


Psychiatric:  Positive: Depression, Suicidal Ideations, Disorder of Thought, No

: Mood Disorder, Substance Abuse, Homicidal Ideation


Endocrine:  No: Polydipsia


Hematologic/Lymphatic:  No: Easy Bruising





Physical Exam


Narrative


GENERAL: NAD


SKIN: Focused skin assessment warm/dry.


HEAD: Atraumatic. Normocephalic. 


EYES: Pupils equal and round. No scleral icterus. No injection or drainage. 


ENT: No nasal bleeding or discharge.  Mucous membranes pink and moist.


NECK: Trachea midline. No JVD. 


CARDIOVASCULAR: Regular rate and rhythm.  No murmur appreciated.


RESPIRATORY: No accessory muscle use. Clear to auscultation. Breath sounds 

equal bilaterally. 


GASTROINTESTINAL: Abdomen soft, non-tender, nondistended. Hepatic and splenic 

margins not palpable. 


MUSCULOSKELETAL: No obvious deformities. No clubbing.  No cyanosis.  No edema. 


NEUROLOGICAL: Awake and alert. No obvious cranial nerve deficits.  Motor 

grossly within normal limits. Normal speech.


PSYCHIATRIC: positive SI, negative HI





Data


Data


Last Documented VS





Vital Signs








  Date Time  Temp Pulse Resp B/P (MAP) Pulse Ox O2 Delivery O2 Flow Rate FiO2


 


18 12:31 98.1 106 16 145/103 (117) 100   








Orders





 Orders


Complete Blood Count With Diff (18 13:00)


Comprehensive Metabolic Panel (18 13:00)


Thyroid Stimulating Hormone (18 13:00)


Ed Urine Pregnancytest Poc (18 13:00)


Psych Screen (18 13:00)


Drug Screen, Random Urine (18 13:00)








MDM


Medical Decision Making


Medical Screen Exam Complete:  Yes


Emergency Medical Condition:  Yes


Medical Record Reviewed:  Yes


Interpretation(s)





Vital Signs








  Date Time  Temp Pulse Resp B/P (MAP) Pulse Ox O2 Delivery O2 Flow Rate FiO2


 


18 12:31 98.1 106 16 145/103 (117) 100   








Differential Diagnosis


Depression, suicidal ideation


Narrative Course


Psychiatric screening labs were ordered.  Once medically cleared, will have 

patient screened.











Ilene Bernal DO 2018 13:06 Received call from [facility] at [address] [facility]. The following information was included in patient's discharge instructions. Scheduled for Lung nodule biopsy and Bone marrow biopsy on June 8, 2021 at 11 AM (arrival time of 10am at outpatient registration in [place] [address]); Nothing to eat or drink after midnight; Bring 's license, insurance card and a list of medications. Patient needs to have a  bring him to the procedure. Patient is off Effient per Tania Parrish NP. H&P, PT, PTT and platelets needed before procedure.

## 2021-05-28 NOTE — PROGRESS NOTES
MIKI met with patient briefly, he had just finished up with PT. Patient stated he is to be discharged this afternoon, his RN came in with new bag of inotrope to switch out ,patient stated they were going to hook him up on home pump. Patient offered he has do some further testing prior to discussion of LVAD, SW offered to complete LVAD Evaluation as out patient in the [facility] and he agreed. MIKI will continue to follow.

## 2021-05-28 NOTE — PROGRESS NOTES
1930: Bedside shift change report given to Aimee Francisco (oncoming nurse) by Padmini Veras (offgoing nurse). Report included the following information SBAR, Intake/Output, MAR, Accordion, Recent Results, Med Rec Status and Cardiac Rhythm NSR/ST. 2215: PICC line very positional, hard to flush and no blood return from either port; cathflo ordered; Pt does not have home CPAP with him, but declines to use our    2350: Cathflo instilled in Pink port to indwell; white port now gives blood return and is easier to flush with change of position    0200: Cathflo removed from pink port with blood return restored    0430: Pink port again sluggish to flush and difficult to find a position with brisk blood return; white port gives brisk blood return with arm raised    0456: 12 beat run of VTach noted on tele; pt asymptomatic; pt has AICD in place; will await lab results and continue to monitor    0618: Lab reports PTT of 113.4; PTT was drawn off PICC line, will redraw with peripheral stick    0754: Called lab to request stat PTT results, still pending    0823: Repeat PTT remains supratherapeutic; Heparin drip stopped per protocol and provider notified via [employer]    [id number]: Bedside shift change report given to Tosin Torrez (oncoming nurse) by Aimee Francisco (offgoing nurse). Report included the following information SBAR, Intake/Output, MAR, Accordion, Recent Results, Med Rec Status and Cardiac Rhythm NSR/ST.

## 2021-05-28 NOTE — PROGRESS NOTES
CM spoke with treatment team re discharge plan:    [facility] NP - patient will discharge home on same inotrope prescription (dosage/wt) as previous order; new order placed for home infusion company to verify. [facility] RN - concerns regarding patient's RUE; swelling/redness/warm to touch; attending MD placed order for US Duplex; we will await discharge until RUE is addressed; PICC team also to assess due to bleeding    1230 - CM sent home inotrope order to [facility]. Still awaiting confirmation from attending MD if patient will discharge today. 1300 - Requested [facility] to see patient this evening to hook patient up to home milrinone. Patient's wife to bring bag in from home at [address] will be here around 5:15PM to connect patient to his home milrinone. CM spoke with patient who confirmed discharge plan. Home Health order sent to [facility]. CM will continue follow.     Adeola Miles, MPH  Care Manager [facility]  Available via [employer] or

## 2021-05-28 NOTE — DIABETES MGMT
I have reviewed this student's note and provided guidance. Agree with assessment/ recommendations and plan for this patient. [facility] FOR DIABETES HEALTH    CLINICAL NURSE SPECIALIST CONSULT   INITIAL NOTE    Initial Presentation   Fani Almonte is a 77 y.o. male admitted with  chronic systolic heart failure being evaluated for LVAD    HX:   Past Medical History:   Diagnosis Date    CAD (coronary artery disease) '90 '97, '13 x 2    [facility], code ice in 2013 at [facility]    Calculus of kidney     Colonic polyps     Congestive heart failure, unspecified     Diabetes ([facility]     Gastritis     Hypercholesterolemia     Sleep apnea         DX: Class IV systolic heart failure    Treatment plan     TX: chronic inotropic medicine; evaluation for LVAD    Hospital course   Clinical progress has been complicated by chronic heart failure. Diabetes    Patient has known Type 2 diabetes, treated with Glipizide SR 10 mg.daily PTA. Admission  and A1c 7.0% indicate acceptable diabetes control. Ambulatory blood glucose management provided by primary care provider/endocrinologist Dr. Jose Mcpherson PCP.   Consulted by Provider for advanced diabetes nursing assessment and care, specifically related to   [] Transitioning off Gaston Messina   [] Inpatient management strategy  [x] Home management assessment  [] Survival skill education    Diabetes-related medical history  Acute complications  None  Neurological complications  Peripheral neuropathy  Microvascular disease  none  Macrovascular disease  CAD and Myocardial infarction  Other associated conditions     Hypercholesterolemia, SRUTHI    Diabetes medication history  Drug class Currently in use Discontinued Never used   Biguanide  Metformin (discontinued d/t heart failure)    DDP-4 inhibitor       Sulfonylurea Glipizide SR 10 mg. daily     Thiazolidinedione      GLP-1 RA      SGLT-2 inhibitors      Basal insulin      Bolus insulin      Fixed Dose  Combinations Subjective   I I'm feeling better today. I know they are working me up for a heart pump,but I'm not sure what I'm doing to do about that.  Patient reports that he really only eats about 2 meals a day and states \"sometimes certain smells set me off and I can't eat\". Patient reports the following home diabetes self-care practices:  Eating pattern  [] Breakfast Oatmeal, cereal, or egg  [] Lunch  \"my wife sometimes orders out\"  [] Dinner  Not specified  [] Bedtime Not specified  [] Snacks Not specified  [] Beverages Not specified  Physical activity pattern: Chronic systolic HF  Monitoring pattern: states he only checks BG once a day in the morning    Taking medications pattern  [] Consistent administration  [] Affordable         Objective   Physical exam  General   Neuro  Alert, oriented and slightly short of breath while talking, but no distress. Conversant and cooperative. Vital Signs   Visit Vitals  /79 (BP 1 Location: Left upper arm, BP Patient Position: Standing)   Pulse (!) 107   Temp 97.8 °F (36.6 °C)   Resp 18   Ht 6' 1\" (1.854 m)   Wt 87.1 kg (192 lb 0.3 oz)   SpO2 98%   BMI 25.33 kg/m²     Skin  Warm and dry. Extremities No foot wounds    Diabetic foot exam:    Left Foot     Visual Exam: normal    Pulse DP: 2+ (normal)   Filament test: reduced sensation      Right Foot   Visual Exam: normal    Pulse DP: 2+ (normal)   Filament test: reduced sensation     DP & PT pulses +2.      Laboratory  BMP:   Lab Results   Component Value Date/Time     (L) 05/28/2021 04:40 AM    K 4.2 05/28/2021 04:40 AM     05/28/2021 04:40 AM    CO2 24 05/28/2021 04:40 AM    AGAP 7 05/28/2021 04:40 AM     (H) 05/28/2021 04:40 AM    BUN 14 05/28/2021 04:40 AM    CREA 0.90 05/28/2021 04:40 AM    GFRAA >60 05/28/2021 04:40 AM    GFRNA >60 05/28/2021 04:40 AM      Factors impacting BG management  Factor Dose Comments   Nutrition:  Carb-controlled meals     60 grams per meal      Blood glucose pattern  Range from       Assessment and Plan   Nursing Diagnosis Risk for unstable blood glucose pattern   Nursing Intervention Domain 3130 Decision-making Support   Nursing Interventions Examined current inpatient diabetes control   Explored factors facilitating and impeding inpatient management  Identified self-management practices impeding diabetes control  Explored corrective strategies with patient and responsible inpatient provider   Informed patient of rational for insulin strategy while hospitalized     Evaluation   This 77 y o AA male, with Type 2 diabetes/did achieve diabetes control prior to admission, as evidenced by admission BG of 146 and A1c of 7.0%. Patient is currently being worked up for LVAD d/t chronic systolic HF (Class IV) and had some chest pain prior to admission. States he is feeling much better on inpatient inotrope therapy. He is followed by Dr. Kole Ku PCP and states he usually goes to the [facility]. Voiced reservations about LVAD placement. Fasting BG was 191 this morning and has not been checked since then  Recommendations     Continued with sliding scale correctional insulin while inpatient. Continue with prior Glipizide SR dose per PTA. No indications to reduce or discontinue this med in light of his HF. Billing Code(s)   53381  [id number]    Before making these care recommendations, I personally reviewed the hospitalization record, including notes, laboratory & diagnostic data and current medications, and   examined the patient at the bedside (circumstances permitting) before making care recommendations.      Total minutes: 72    Cheryl Bales RN CNS student LUISANAU/ Halina Hunter RN [facility]  Diabetes Clinical Nurse Specialist  [facility] Diabetes [facility]  Access via [facility]

## 2021-05-28 NOTE — PROGRESS NOTES
Consult received, chart reviewed, pt is off the unit for testing.  PT will defer, follow and see as able and appropriate for the eval. Thank you, Tammy Andersen, PT

## 2021-05-28 NOTE — PROGRESS NOTES
[address] [facility] Adult  Hospitalist Group                                                                                          Hospitalist Progress Note  Audrey Haley MD  Answering service: [phone] OR [phone] from in house phone        Date of Service:  2021  NAME:  Hyun Salcido  :  1945  MRN:  [id number]      Admission Summary: This is a 78-year-old man with a past medical history significant for coronary artery disease, status post CABG, hypertension, dyslipidemia, type 2 diabetes, obstructive sleep apnea, chronic systolic congestive heart failure, nonischemic cardiomyopathy, status post AICD placement, who was in his usual state of health until the day of presentation at the emergency room when the patient developed chest pain. The chest pain started 1 hour before coming to the emergency room. The pain is constant, located at the center of the chest with no radiation. No known aggravating or relieving factors. No associated nausea or vomiting. No diaphoresis. The patient described the pain as pressure-like, 5/10 in severity. When the patient arrived at the emergency room, the patient's initial troponin level was elevated. He was recently admitted to this hospital from 2021 to 2021. The patient was admitted by the Heart Failure team for optimization of treatment of his congestive heart failure. The patient was also started on milrinone drip. Evaluation for LVAD placement was also initiated. The patient was discharged home in stable condition. When the patient arrived at the emergency room, a CTA of the chest was obtained. This was negative for pulmonary embolism. The patient was subsequently referred to the hospitalist service for evaluation for admission.       Interval history / Subjective:     F/u Chest pain/NSTEMI     Patient complains of right forearm pain and swelling from where peripheral IV line was in.   Slightly tender but not warm.  Otherwise he denies shortness of breath or chest pain. He is up and moving back and forth the bathroom, inside his room. PT evaluated and cleared him. Advanced heart failure team texted to relate me that patient could be discharged from their perspective once seen by PT and DTC, the PFT order is meant to be for outpatient. I touched base with cardiologist Dr. Kyleigh Torrez, okay for discharge, no further cardiac work-up. Assessment & Plan:     Suspected non-ST-elevation myocardial infarction  -in a patient with CAD s/p CABG  -Patient had a right and left heart cath on 5/24/21 that showed severe diffuse native vessel CAD, with patent grafts (LIMA to D2, SVG to RCA). There were no good interventional targets noted  -Valuated by cardiologist, recommended continuing medical management, no further cardiac work-up needed. Heparin drip that was initiated on admission discontinued. Nonischemic cardiomyopathy, status post AICD placement.    -The patient is being considered for LVAD, to complete work-up as outpatient.  -Advanced heart failure team evaluated, cleared for discharge  -Continue home inotrope. Hypertension  -Continue home meds    Dyslipidemia. continue home meds  Type 2 diabetes with hyperglycemia.    -Continue home glipizide. DTC help appreciated  Obstructive sleep apnea. Continue CPAP use. Chronic systolic congestive heart failure. Seems compensatedContinue home med  Monoclonal spike. Outpatient follow up with Dr Kim Arita  Pulmonary nodules. stable on CT chest. Outpatient follow up with Pulmonary for FNA  Elevated D-dimer. CTA chest unremarkable for PE. Bilateral lower extremity venous Doppler negative for DVT  Hyponatremia. This is most likely due to chronic congestive heart failure. Monitor    Left forearm thrombophlebitis, venous Doppler negative for DVT: Symptomatic treatment, swelling and pain improving with cold compress. Status post right arm PICC line for home inotrope.     Diabetic diet    Patient will be discharged with this afternoon. PT evaluated and cleared, no need for home therapy. Home health infusion company to set him up for dobutamine prior to discharge. Care Plan discussed with: Patient/Family  Anticipated Disposition: Home w/Family  Anticipated Discharge: Discharge this afternoon. Hospital Problems  Date Reviewed: 5/27/2021        Codes Class Noted POA    * (Principal) NSTEMI (non-ST elevated myocardial infarction) [facility]) ICD-10-CM: I21.4  ICD-9-CM: 410.70  5/27/2021 Yes                Review of Systems:   A comprehensive review of systems was negative except for that written in the HPI. Vital Signs:    Last 24hrs VS reviewed since prior progress note. Most recent are:  Visit Vitals  BP (!) 120/6 (BP 1 Location: Left upper arm, BP Patient Position: Supine)   Pulse (!) 103   Temp 97.8 °F (36.6 °C)   Resp 18   Ht 6' 1\" (1.854 m)   Wt 87.1 kg (192 lb 0.3 oz)   SpO2 98%   BMI 25.33 kg/m²         Intake/Output Summary (Last 24 hours) at 5/28/2021 1523  Last data filed at 5/28/2021 1220  Gross per 24 hour   Intake 1892 ml   Output 900 ml   Net 992 ml        Physical Examination:     I had a face to face encounter with this patient and independently examined them on 5/28/2021 as outlined below:          Constitutional:  No acute distress   ENT:  Oral mucosa moist, oropharynx benign. Resp:  CTA bilaterally. No wheezing/rhonchi/rales. No accessory muscle use   CV:  Regular rhythm, normal rate, no murmurs, gallops, rubs    GI:  Soft, non distended, non tender. normoactive bowel sounds, no hepatosplenomegaly     Musculoskeletal:  No edema, warm, 2+ pulses throughout  Swelling and mild tenderness on the right mid forearm from thrombophlebitis. Right arm PICC line in place. Neurologic:  Moves all extremities.   AAOx3, CN II-XII reviewed            Data Review:    Review and/or order of clinical lab test      Labs:     Recent Labs     05/28/21  0440 05/26/21  2244   WBC 3.4* 4. 4   HGB 12.9 13.6   HCT 41.8 43.2    160     Recent Labs     05/28/21  0440 05/26/21  2244   * 132*   K 4.2 4.4    102   CO2 24 21   BUN 14 12   CREA 0.90 1.00   * 281*   CA 9.2 9.6   MG 2.3 2.3   PHOS  --  2.6     Recent Labs     05/28/21  0440 05/26/21  2244   ALT 40 29   * 240*   TBILI 1.1* 1.4*   TP 7.3 8.0   ALB 3.3* 3.6   GLOB 4.0 4.4*   LPSE  --  320     Recent Labs     05/28/21  1126 05/28/21  0630 05/28/21  0440 05/26/21  2300   INR  --   --   --  1.1   PTP  --   --   --  11.4*   APTT 46.3* 118.8* 113.4* 25.9      No results for input(s): FE, TIBC, PSAT, FERR in the last 72 hours. Lab Results   Component Value Date/Time    Folate 15.7 05/23/2021 04:48 AM      No results for input(s): PH, PCO2, PO2 in the last 72 hours.   Recent Labs     05/28/21  0952 05/27/21  0528 05/26/21 2243   TROIQ 3.65* 10.40* 0.09*     Lab Results   Component Value Date/Time    Cholesterol, total 112 05/20/2021 04:28 PM    HDL Cholesterol 44 05/20/2021 04:28 PM    LDL, calculated 50.8 05/20/2021 04:28 PM    Triglyceride 86 05/20/2021 04:28 PM    CHOL/HDL Ratio 2.5 05/20/2021 04:28 PM     Lab Results   Component Value Date/Time    Glucose (POC) 191 (H) 05/28/2021 06:28 AM    Glucose (POC) 154 (H) 05/27/2021 10:24 PM    Glucose (POC) 261 (H) 05/27/2021 05:14 PM    Glucose (POC) 153 (H) 05/27/2021 02:39 PM    Glucose (POC) 157 (H) 05/27/2021 10:36 AM     Lab Results   Component Value Date/Time    Color YELLOW/STRAW 05/21/2021 02:33 PM    Appearance CLEAR 05/21/2021 02:33 PM    Specific gravity 1.006 05/21/2021 02:33 PM    pH (UA) 7.0 05/21/2021 02:33 PM    Protein Negative 05/21/2021 02:33 PM    Glucose Negative 05/21/2021 02:33 PM    Ketone Negative 05/21/2021 02:33 PM    Bilirubin Negative 05/21/2021 02:33 PM    Urobilinogen 1.0 05/21/2021 02:33 PM    Nitrites Negative 05/21/2021 02:33 PM    Leukocyte Esterase Negative 05/21/2021 02:33 PM    Epithelial cells FEW 05/21/2021 02:33 PM    Bacteria Negative 05/21/2021 02:33 PM    WBC 0-4 05/21/2021 02:33 PM    RBC 0-5 05/21/2021 02:33 PM         Medications Reviewed:     Current Facility-Administered Medications   Medication Dose Route Frequency    ranolazine ER (RANEXA) tablet 500 mg  500 mg Oral BID    aspirin delayed-release tablet 81 mg  81 mg Oral DAILY    bumetanide (BUMEX) tablet 1 mg  1 mg Oral EVERY OTHER DAY    eplerenone (INSPRA) tablet 50 mg  50 mg Oral DAILY    hydrALAZINE (APRESOLINE) tablet 50 mg  50 mg Oral TID    isosorbide mononitrate ER (IMDUR) tablet 30 mg  30 mg Oral Q12H    magnesium oxide (MAG-OX) tablet 400 mg  400 mg Oral DAILY    milrinone (PRIMACOR) 20 MG/100 ML D5W infusion  0.3 mcg/kg/min IntraVENous TITRATE    tamsulosin (FLOMAX) capsule 0.4 mg  0.4 mg Oral DAILY    rosuvastatin (CRESTOR) tablet 10 mg  10 mg Oral QHS    potassium chloride SR (KLOR-CON 10) tablet 40 mEq  40 mEq Oral BID    sodium chloride (NS) flush 5-40 mL  5-40 mL IntraVENous Q8H    sodium chloride (NS) flush 5-40 mL  5-40 mL IntraVENous PRN    acetaminophen (TYLENOL) tablet 650 mg  650 mg Oral Q6H PRN    Or    acetaminophen (TYLENOL) suppository 650 mg  650 mg Rectal Q6H PRN    polyethylene glycol (MIRALAX) packet 17 g  17 g Oral DAILY PRN    prochlorperazine (COMPAZINE) with saline injection 5 mg  5 mg IntraVENous Q6H PRN    insulin lispro (HUMALOG) injection   SubCUTAneous AC&HS    glucose chewable tablet 16 g  4 Tablet Oral PRN    dextrose (D50W) injection syrg 12.5-25 g  12.5-25 g IntraVENous PRN    glucagon (GLUCAGEN) injection 1 mg  1 mg IntraMUSCular PRN    heparin 25,000 units in D5W 250 ml infusion  11-25 Units/kg/hr IntraVENous TITRATE    benzonatate (TESSALON) capsule 100 mg  100 mg Oral Q6H PRN    alteplase (CATHFLO) 1 mg in sterile water (preservative free) 1 mL injection  1 mg InterCATHeter PRN     ______________________________________________________________________  EXPECTED LENGTH OF STAY: 2d 12h  ACTUAL LENGTH OF STAY: 1                 Mily Ortiz MD

## 2021-05-28 NOTE — PROGRESS NOTES
[facility] in [place], [place]  Inpatient Consult Progress Note      Patient name: Sarah Escobedo  Patient : 1945  Patient MRN: [id number]  Consulting MD: Kj Cloud MD  Primary general cardiologist:  Dr. Ming Syed    Date of service: 21    CHIEF COMPLAINT:  Chief Complaint   Patient presents with    Chest Pain       PLAN OF CARE:  · Severe ischemic cardiomyopathy, LVEF 20-25%; stage D, NYHA class IV symptoms.      PLAN:  Cont milrinone  0.3mcg/kg/min  Discontinued coreg due to RV dysfunction   Discontinue ACEi in anticipation of cardiac surgery  Cont hydralazine 50 mg TID   Cont imdur 30mg twice daily  Cont current dose of eplerenone 50mg daily  Cont potassium to 40meq BID  Patient is not taking SGLT2 inhibitor; HgA1c 7; consider as OP  Cont bumex 1mg every other day    Not on allopurinol, uric acid wnl  Continue baby ASA   Hold effient d/w with Dr. Marciano Duke- will monitor for now   Appreciate cardiology recommendations   Continue current dose of statin  Resume Ranexa 500mg BID  PYP test equivocal  Genetic testing pending  Need records re: indication for DVT filter?   Obtain Records from EP cardiology at [address] [place] [address] Drive CPAP therapy; home CPAP in hospital  [facility] decision tool re: LVAD therapy and hospice   Provided advanced care plan forms to be filled out    [id number] Radha Araya for DC today, has [facility] follow [facility] on      Continue LVAD-DT workup:  · Pulmonary consult appreciated for nodules suspected for primary malignancy; will need biopsy   · Hematology recommendations appreciated, will order bone marrow biopsy   · Palliative care consultation  · Nutritionist consult  · GI recommendations appreciated, will schedule as OP for EGD/Cscope next week for dysphagia/vomiting and colon cancer screening + comment on liver dysfunction       IMPRESSION:  Fatigue at rest  Shortness of breath with minimal exertion  Volume overload  Acute on chronic systolic heart failure  · Stage D, NYHA class IV symptoms  · Non-ischemic cardiomyopathy, LVEF 20% with LVEDD 6.2  · RV dysfunction, TAPSE 1.22 (prelimnary read)  · TBili 1.5 likely from cardiac congestion  At risk of sudden cardiac death  · Recent cardiac arrest   · S/p ICD (1/2013, [employer] followed by [facility])  Coronary artery disease  · S/p multiple interventions  · S/p 4V CABG (8/2012)  · LHC (7/2019) severe stenosis of LIMA to LAD anastomosis site, SVG graft to OM is occluded, SVG graft to RCA 40-50%, LAD occluded proximally, severe proximal LCx, RCA is the long . · PET (6/2019) EF 24% with anterior lateral and inferior reversible defect  Cardiac risk factors  · HTN  · HL  · DM2  · SRUTHI on CPAP  · MIld carotid stenosis  Anemia, microcytic  · Iron deficiency  DVT with filter  Pulmonary nodules         CARDIAC IMAGING:  Echo (5/20/21)  · LV: Estimated LVEF is 20 - 25%. Normal wall thickness. Mildly dilated left ventricle. Severely and globally reduced systolic function. Severe (grade 4) left ventricular diastolic dysfunction. · LA: Severely dilated left atrium. · RA: Severely dilated right atrium. · MV: Moderate mitral valve regurgitation is present. · TV: Moderate tricuspid valve regurgitation is present. · AO: Mild aortic root dilatation. · RV: Pacer/ICD present. · LVED 6.2cm  EKG (5/20/21) SB with 1degree AV block, QRS 116ms    [facility] 5/24/21 [place] Coronaries: LM - moderate to severe distal disease 50%. % prox occluded (), heavily calcified, LCx: 80% proximal stenosis. OM1 is  (seen filling faintly via collaterals). OM2 99% stenosis  RCA: 100% proximal occluded.   LIMA to LAD: patent, supplies only a diagonal branch (probably D2). The LAD distal to the bifurcation is 100% occluded () and fills via right to left collaterals off the SVG to   RCA.   SVG to R-PDA: patent with moderate diffuse irregularities but no obstructive disease in the graft.    No appealing interventional targets.      NST as above    ICD interrogation (5/12/21) [employer] single lead, no events, normal device function and good battery     HEMODYNAMICS:  RHC 5/24/21 CI 1.97, PA 33/9/17, RA 1, PCWP 6- off milrinone   CPEST not done  6MW ordered     OTHER IMAGING:  CXR (6/17/21) clear  CT 5/21/21 1. Irregular pulmonary nodule in the left upper lobe measuring 2 cm, suspicious  for primary pulmonary malignancy. 2. Additional 6 mm left upper lobe pulmonary nodule. 3. Severe calcific atherosclerosis of the coronary arteries and abdominal aorta. No aneurysm. 4. Cardiomegaly. 5. No acute abnormality within the chest, abdomen, or pelvis.     HISTORY OF PRESENT ILLNESS:  I had the pleasure of seeing Heron Oconnell Sr in [facility] at [address] [place] [address] in [address] [place] [address] a 77 y. o. male with h/o HTN, HL, DM2, SRUTHI on CPAP, chronic systolic heart failure, stage D, NYHA class IV symptoms, non-ischemic cardiomyopathy, LVEF 20% with LVEDD 6.2, RV dysfunciton, TAPSE 1.22, TBili 1.5 likely from cardiac congestion, recent cardiac arrest s/p ICD (1/2013, [employer] followed by [employer] coronary artery disease s/p multiple interventions s/p 4V CABG (8/2012), LHC (7/2019) severe stenosis of LIMA to LAD anastomosis site, SVG graft to OM is occluded, SVG graft to RCA 40-50%, LAD occluded proximally, severe proximal LCx, RCA is the long . PET (6/2019) EF 24% with anterior lateral and inferior reversible defect. Anemia, microcytic with iron deficiency, DVT with filter.     Patient was referred to [facility] by Dr. Rob Russell for evaluation of options re: management of advanced heart failure symptoms    Patient presents to ED this morning with complaint of HA and chest pain that had a sudden onset, however at time of exam symptoms have resolved. EKG is unchanged but his troponin was elevated to 10.6. Cardiology and [facility] consulted, will observe for 24 hours.      PHYSICAL EXAM:  Visit Vitals  /62 (BP 1 Location: Left arm, BP Patient Position: At rest)   Pulse 91   Temp 97.8 °F (36.6 °C)   Resp 18   Ht 6' 1\" (1.854 m)   Wt 192 lb 0.3 oz (87.1 kg)   SpO2 100%   BMI 25.33 kg/m²     General: Patient is well developed, well-nourished in no acute distress  HEENT: Normocephalic and atraumatic. No scleral icterus. Pupils are equal, round and reactive to light and accomodation. No conjunctival injection. Oropharynx is clear. Neck: Supple. No evidence of thyroid enlargements or lymphadenopathy. JVD: Cannot be appreciated   Lungs: Breath sounds are equal and clear bilaterally. No wheezes, rhonchi, or rales. Heart: Regular rate and rhythm with normal S1 and S2. No murmurs, gallops or rubs. Abdomen: Soft, no mass or tenderness. No organomegaly or hernia. Bowel sounds present. Genitourinary and rectal: deferred  Extremities: No cyanosis, clubbing, or edema. Neurologic: No focal sensory or motor deficits are noted. Grossly intact. Psychiatric: Awake, alert an doriented x 3. Appropriate mood and affect. Skin: Warm, dry and well perfused. No lesions, nodules or rashes are noted. REVIEW OF SYSTEMS:  General: Denies fever, night sweats. Ear, nose and throat: Denies difficulty hearing, sinus problems, runny nose, post-nasal drip, ringing in ears, mouth sores, loose teeth, ear pain, nosebleeds, sore throate, facial pain or numbess  Cardiovascular: see above in the interval history  Respiratory: Denies cough, wheezing, sputum production, hemoptysis.   Gastrointestinal: Denies heartburn, constipation, intolerance to certain foods, diarrhea, abdominal pain, nausea, vomiting, difficulty swallowing, blood in stool  Kidney and bladder: Denies painful urination, frequent urination, urgency, prostate problems and impotence  Musculoskeletal: Denies joint pain, muscle weakness  Skin and hair: Denies change in existing skin lesions, hair loss or increase, breast changes    PAST MEDICAL HISTORY:  Past Medical History:   Diagnosis Date    CAD (coronary artery disease) '90 '97, '13 x 2    MI, code ice in 2013 at [facility]    Calculus of kidney     Colonic polyps     Congestive heart failure, unspecified     Diabetes ([facility] [address]     Gastritis     Hypercholesterolemia     Sleep apnea        PAST SURGICAL HISTORY:  Past Surgical History:   Procedure Laterality Date    COLONOSCOPY  04/06/2011    16, due 21    ENDOSCOPY, COLON, DIAGNOSTIC      11, due 16    HX CORONARY ARTERY BYPASS GRAFT  8/22/12    x 4 vessel by MISSY LION    HX PACEMAKER PLACEMENT  1/30/13    SD CARDIAC SURG PROCEDURE UNLIST  2012    x 4 vessels       FAMILY HISTORY:  Family History   Problem Relation Age of Onset    Heart Disease Mother         MI    Heart Disease Father         CAD & PVD    Lung Disease Father     Cancer Father         lung    Diabetes Maternal Grandmother     Heart Disease Other         CAD    Stroke Sister        SOCIAL HISTORY:  Social History     Socioeconomic History    Marital status:      Spouse name: Not on file    Number of children: Not on file    Years of education: Not on file    Highest education level: Not on file   Tobacco Use    Smoking status: Never Smoker    Smokeless tobacco: Never Used   Substance and Sexual Activity    Alcohol use: Yes     Alcohol/week: 0.0 standard drinks     Comment:  VERY RARE    Drug use: No     Social Determinants of Health     Financial Resource Strain:     Difficulty of Paying Living Expenses:    Food Insecurity:     Worried About Running Out of Food in the Last Year:     [address] in the Last Year:    Transportation Needs:     Lack of Transportation (Medical):      Lack of Transportation (Non-Medical):    Physical Activity:     Days of Exercise per Week:     Minutes of Exercise per Session:    Stress:     Feeling of Stress :    Social Connections:     Frequency of Communication with Friends and Family:     Frequency of Social Gatherings with Friends and Family:     Attends [employer] Services:     Active Member of Clubs or Organizations:     Attends Club or Organization Meetings:     Marital Status:        LABORATORY RESULTS:     Labs Latest Ref Rng & Units 5/28/2021 5/26/2021 5/25/2021 5/24/2021 5/23/2021 5/23/2021 5/22/2021   WBC 4.1 - 11.1 K/uL 3.4(L) 4.4 4. 0(L) 3. 6(L) - 3. 0(L) -   RBC 4.10 - 5.70 M/uL 5.49 5.72(H) 5.52 5.78(H) - 5.51 -   Hemoglobin 12.1 - 17.0 g/dL 12.9 13.6 12.8 13.4 - 12.8 -   Hematocrit 36.6 - 50.3 % 41.8 43.2 41.4 43.7 - 40.9 -   MCV 80.0 - 99.0 FL 76. 1(L) 75. 5(L) 75. 0(L) 75. 6(L) - 74. 2(L) -   Platelets 890 - 006 K/uL 165 160 158 152 - 135(L) -   Lymphocytes 12 - 49 % 35 19 - - - - -   Monocytes 5 - 13 % 16(H) 11 - - - - -   Eosinophils 0 - 7 % 3 1 - - - - -   Basophils 0 - 1 % 1 1 - - - - -   Albumin 3.5 - 5.0 g/dL 3. 3(L) 3.6 3. 2(L) 3.4(L) - 3. 4(L) -   Calcium 8.5 - 10.1 MG/DL 9.2 9.6 8.9 9.6 9.1 9.2 9.2   Glucose 65 - 100 mg/dL 217(H) 281(H) 177(H) 125(H) 187(H) 128(H) 188(H)   BUN 6 - 20 MG/DL 14 12 13 16 14 15 15   Creatinine 0.70 - 1.30 MG/DL 0.90 1.00 0.97 1.10 1.13 1.01 1.23   Sodium 136 - 145 mmol/L 133(L) 132(L) 134(L) 135(L) 135(L) 135(L) 138   Potassium 3.5 - 5.1 mmol/L 4.2 4.4 4.7 4.1 4.1 3.1(L) 3.7   TSH 0.36 - 3.74 uIU/mL - 1.47 - - - - -   PSA 0.01 - 4.0 ng/mL - - - - - - -   LDH 85 - 241 U/L - - - - - - -   Some recent data might be hidden     Lab Results   Component Value Date/Time    TSH 1.47 05/26/2021 10:44 PM    TSH 1.97 05/20/2021 04:28 PM    TSH 1.41 02/09/2021 03:05 PM    TSH 1.740 08/14/2018 09:58 AM    TSH 1.710 10/18/2017 12:00 AM       ALLERGY:  Allergies   Allergen Reactions    Pcn [Penicillins] Hives        CURRENT MEDICATIONS:    Current Facility-Administered Medications:     ranolazine ER (RANEXA) tablet 500 mg, 500 mg, Oral, BID, Danica Stewart NP    aspirin delayed-release tablet 81 mg, 81 mg, Oral, DAILY, Karri Matthews MD, 81 mg at 05/28/21 0942    bumetanide (BUMEX) tablet 1 mg, 1 mg, Oral, EVERY OTHER DAY, Karri Matthews MD, 1 mg at 05/27/21 0449    eplerenone (INSPRA) tablet 50 mg, 50 mg, Oral, DAILY, Karri Matthews MD, 50 mg at 05/28/21 0942    hydrALAZINE (APRESOLINE) tablet 50 mg, 50 mg, Oral, TID, Karri Matthews MD, 50 mg at 05/28/21 0942    isosorbide mononitrate ER (IMDUR) tablet 30 mg, 30 mg, Oral, Q12H, Karri Matthews MD, 30 mg at 05/28/21 0942    magnesium oxide (MAG-OX) tablet 400 mg, 400 mg, Oral, DAILY, Karri Matthews MD, 400 mg at 05/28/21 0942    milrinone (PRIMACOR) 20 MG/100 ML D5W infusion, 0.3 mcg/kg/min, IntraVENous, TITRATE, Karri Matthews MD, Last Rate: 8 mL/hr at 05/28/21 0850, 0.3 mcg/kg/min at 05/28/21 0850    tamsulosin (FLOMAX) capsule 0.4 mg, 0.4 mg, Oral, DAILY, Karri Matthews MD, 0.4 mg at 05/28/21 0942    rosuvastatin (CRESTOR) tablet 10 mg, 10 mg, Oral, QHS, Karri Matthews MD, 10 mg at 05/27/21 2214    potassium chloride SR (KLOR-CON 10) tablet 40 mEq, 40 mEq, Oral, BID, Karri Matthews MD, 40 mEq at 05/28/21 0942    sodium chloride (NS) flush 5-40 mL, 5-40 mL, IntraVENous, Q8H, Karri Matthews MD, 10 mL at 05/28/21 5283    sodium chloride (NS) flush 5-40 mL, 5-40 mL, IntraVENous, PRN, Karri Matthews MD    acetaminophen (TYLENOL) tablet 650 mg, 650 mg, Oral, Q6H PRN **OR** acetaminophen (TYLENOL) suppository 650 mg, 650 mg, Rectal, Q6H PRN, Karri Matthews MD    polyethylene glycol (MIRALAX) packet 17 g, 17 g, Oral, DAILY PRN, Karri Matthews MD    prochlorperazine (COMPAZINE) with saline injection 5 mg, 5 mg, IntraVENous, Q6H PRN, Karri Matthews MD    insulin lispro (HUMALOG) injection, , SubCUTAneous, AC&HS, Karri Matthews MD, 2 Units at 05/28/21 0635    glucose chewable tablet 16 g, 4 Tablet, Oral, PRN, Karri Matthews MD    dextrose (D50W) injection syrg 12.5-25 g, 12.5-25 g, IntraVENous, PRN, Karri Matthews MD    glucagon (GLUCAGEN) injection 1 mg, 1 mg, IntraMUSCular, PRN, Yary Martinez MD    heparin 25,000 units in D5W 250 ml infusion, 11-25 Units/kg/hr, IntraVENous, TITRATE, Karri Matthews MD, Stopped at 05/28/21 8483    benzonatate (TESSALON) capsule 100 mg, 100 mg, Oral, Q6H PRN, Samira Dash MD, 100 mg at 05/28/21 0356    alteplase (CATHFLO) 1 mg in sterile water (preservative free) 1 mL injection, 1 mg, InterCATHeter, PRN, Samira Dash MD, 1 mg at 05/27/21 2350    PATIENT CARE TEAM:  Patient Care Team:  Henry Love MD as PCP - Jorge Guzman MD as PCP - [facility]  Mayuri Ortiz MD as Physician (Cardiology)  Cathy Kuo MD as Physician (Cardiology)  Aurea Roche MD as Physician (Gastroenterology)  Kristopher Luna MD as Physician (Orthopedic Surgery)  Scottie Alcantara MD as Physician (Ophthalmology)  Teri Lope Wilms, MD as Physician ([address])  Pritesh Hill MD (Cardiology)  Antoni Dillon MD (Cardiology)  Urban Goodwin RN as Care Transitions Nurse     Thank you for allowing me to participate in this patient's care. Aiyana Galaviz NP  [facility] [address], [address]  Phone: [phone]      [facility] ATTENDING ADDENDUM    Patient was seen and examined in person. Data and notes were reviewed. I have discussed and agree with the plan as noted in the NP note above without further additions.     Josi Cooper MD PhD  [address] Whitaker [phone]

## 2021-05-28 NOTE — DISCHARGE SUMMARY
Discharge Summary       PATIENT ID: Vilma Griffith  MRN: [id number]   YOB: 1945    DATE OF ADMISSION: 5/26/2021 10:15 PM    DATE OF DISCHARGE: 05/28/21     PRIMARY CARE PROVIDER: MD Arianna Conde MD      DISCHARGING PROVIDER: Mily Ortiz MD    To contact this individual call [phone] and ask the  to page. If unavailable ask to be transferred the Adult Hospitalist Department. CONSULTATIONS: IP CONSULT TO HOSPITALIST  IP CONSULT TO CARDIOLOGY  IP CONSULT TO CARDIOLOGY    PROCEDURES/SURGERIES: * No surgery found *    ADMITTING DIAGNOSES & HOSPITAL COURSE:     This is a 15-year-old man with a past medical history significant for coronary artery disease, status post CABG, hypertension, dyslipidemia, type 2 diabetes, obstructive sleep apnea, chronic systolic congestive heart failure, nonischemic cardiomyopathy, status post AICD placement, who was in his usual state of health until the day of presentation at the emergency room when the patient developed chest pain. The chest pain started 1 hour before coming to the emergency room. The pain is constant, located at the center of the chest with no radiation. No known aggravating or relieving factors. No associated nausea or vomiting. No diaphoresis. The patient described the pain as pressure-like, 5/10 in severity. When the patient arrived at the emergency room, the patient's initial troponin level was elevated. He was recently admitted to this hospital from 05/20/2021 to 05/25/2021. The patient was admitted by the Heart Failure team for optimization of treatment of his congestive heart failure. The patient was also started on milrinone drip. Evaluation for LVAD placement was also initiated. The patient was discharged home in stable condition. When the patient arrived at the emergency room, a CTA of the chest was obtained. This was negative for pulmonary embolism. T      DISCHARGE DIAGNOSES / PLAN:    Suspected non-ST-elevation myocardial infarction  -in a patient with CAD s/p CABG  -Patient had a right and left heart cath on 5/24/21 that showed severe diffuse native vessel CAD, with patent grafts (LIMA to D2, SVG to RCA). There were no good interventional targets noted  -Valuated by cardiologist, recommended continuing medical management, no further cardiac work-up needed. Heparin drip that was initiated on admission discontinued. Nonischemic cardiomyopathy, status post AICD placement.    -Not on beta-blocker due to RV dysfunction. Not ACE//ARB due to consideration for heart surgery.  -The patient is being considered for LVAD, to complete work-up as outpatient.  -Advanced heart failure team evaluated, cleared for discharge  -Continue home inotrope. Hypertension  -Continue home meds    Dyslipidemia. continue home meds  Type 2 diabetes with hyperglycemia.    -Continue home glipizide. DTC help appreciated  Obstructive sleep apnea. Continue CPAP use. Chronic systolic congestive heart failure. Seems compensatedContinue home med  Monoclonal spike. Outpatient follow up with Dr Gabe Law  Pulmonary nodules. stable on CT chest. Outpatient follow up with Pulmonary for FNA  Elevated D-dimer. CTA chest unremarkable for PE. Bilateral lower extremity venous Doppler negative for DVT  Hyponatremia. This is most likely due to chronic congestive heart failure. Monitor    Left forearm thrombophlebitis, venous Doppler negative for DVT: Symptomatic treatment, swelling and pain improving with cold compress. Status post right arm PICC line for home inotrope.         PENDING TEST RESULTS:   At the time of discharge the following test results are still pending: none    FOLLOW UP APPOINTMENTS:    Follow-up Information     Follow up With Specialties Details Why [id number] [facility]  [facility] Cardiology On 6/1/2021 [address] [place] [address] At [place], [address] [place] [id number]  [phone]    Henry Love MD Internal Medicine   [address] [facility] [phone]               DIET: Regular Diet and Cardiac Diet    ACTIVITY: Activity as tolerated      EQUIPMENT needed: own      DISCHARGE MEDICATIONS:  Current Discharge Medication List      START taking these medications    Details   ranolazine ER (RANEXA) 500 mg SR tablet Take 1 Tablet by mouth two (2) times a day for 30 days. Qty: 60 Tablet, Refills: 0  Start date: 5/28/2021, End date: 6/27/2021         CONTINUE these medications which have NOT CHANGED    Details   eplerenone (INSPRA) 50 mg tab tablet Take 1 Tablet by mouth daily. Qty: 90 Tablet, Refills: 1  Start date: 5/26/2021      isosorbide mononitrate ER (IMDUR) 30 mg tablet Take 1 Tablet by mouth every twelve (12) hours. Qty: 180 Tablet, Refills: 1      ergocalciferol (ERGOCALCIFEROL) 1,250 mcg (50,000 unit) capsule Take 1 Capsule by mouth every seven (7) days for 11 doses. Qty: 11 Capsule, Refills: 0  Start date: 5/28/2021, End date: 8/7/2021      hydrALAZINE (APRESOLINE) 50 mg tablet Take 1 Tablet by mouth three (3) times daily. Qty: 180 Tablet, Refills: 2      magnesium oxide (MAG-OX) 400 mg tablet Take 1 Tablet by mouth daily. Qty: 90 Tablet, Refills: 1  Start date: 5/26/2021      milrinone (PRIMACOR) 20 mg/100 mL (200 mcg/mL) infusion 26.58 mcg/min by IntraVENous route continuous. Qty: 100 mL, Refills: 0      potassium chloride SR (K-TAB) 20 mEq tablet Take 2 Tablets by mouth two (2) times a day. Qty: 240 Tablet, Refills: 1      ondansetron hcl (ZOFRAN) 4 mg tablet Take 1 Tab by mouth every eight (8) hours as needed for Nausea, Vomiting or Nausea or Vomiting.   Qty: 20 Tab, Refills: 1    Associated Diagnoses: Nausea; Non-intractable vomiting with nausea, unspecified vomiting type      rosuvastatin (CRESTOR) 10 mg tablet TAKE 1 TABLET NIGHTLY  Qty: 90 Tab, Refills: 3      ferrous sulfate (Iron) 325 mg (65 mg iron) tablet Take  by mouth Daily (before breakfast). omeprazole (PRILOSEC) 20 mg capsule Take 1 Cap by mouth daily. Indications: indigestion  Qty: 30 Cap, Refills: 1    Associated Diagnoses: Nausea      tamsulosin (FLOMAX) 0.4 mg capsule TAKE 1 CAPSULE DAILY  Qty: 90 Cap, Refills: 3    Associated Diagnoses: Benign prostatic hyperplasia without lower urinary tract symptoms      glipiZIDE SR (GLUCOTROL XL) 10 mg CR tablet TAKE 1 TABLET DAILY (DUE FOR OFFICE VISIT PLEASE CALL OFFICE TO SCHEDULE)  Qty: 30 Tab, Refills: 0      glucose blood VI test strips (OneTouch Ultra Blue Test Strip) strip Use to test blood sugar daily  Qty: 100 Strip, Refills: 11    Associated Diagnoses: Type 2 diabetes, controlled, with peripheral circulatory disorder (HCC)      lancets misc Use to test blood sugar daily  Qty: 100 Each, Refills: 11    Associated Diagnoses: Type 2 diabetes, controlled, with peripheral circulatory disorder (HCC)      bumetanide (BUMEX) 2 mg tablet Take  by mouth. 1/2 tab every other day. Refills: 0      acetaminophen (TYLENOL EXTRA STRENGTH) 500 mg tablet Take  by mouth every six (6) hours as needed. aspirin delayed-release 81 mg tablet Take 81 mg by mouth daily. NOTIFY YOUR PHYSICIAN FOR ANY OF THE FOLLOWING:   Fever over 101 degrees for 24 hours. Chest pain, shortness of breath, fever, chills, nausea, vomiting, diarrhea, change in mentation, falling, weakness, bleeding. Severe pain or pain not relieved by medications. Or, any other signs or symptoms that you may have questions about.     DISPOSITION:  x  Home With:   OT x PT x HH  RN       Long term SNF/Inpatient Rehab    Independent/assisted living    Hospice    Other:       PATIENT CONDITION AT DISCHARGE:     Functional status    Poor Deconditioned    x Independent      Cognition   x  Lucid     Forgetful     Dementia      Catheters/lines (plus indication)    Giles    x PICC     PEG     None      Code status   x  Full code     DNR      PHYSICAL EXAMINATION AT DISCHARGE:      Constitutional:  No acute distress   ENT:  Oral mucosa moist, oropharynx benign. Resp:  CTA bilaterally. No wheezing/rhonchi/rales. No accessory muscle use   CV:  Regular rhythm, normal rate, no murmurs, gallops, rubs    GI:  Soft, non distended, non tender. normoactive bowel sounds, no hepatosplenomegaly     Musculoskeletal:  No edema, warm, 2+ pulses throughout  Swelling and mild tenderness on the right mid forearm from thrombophlebitis. Right arm PICC line in place. Neurologic:  Moves all extremities.   AAOx3, CN II-XII reviewed           CHRONIC MEDICAL DIAGNOSES:  Problem List as of 5/28/2021 Date Reviewed: 5/27/2021        Codes Class Noted - Resolved    * (Principal) NSTEMI (non-ST elevated myocardial infarction) ([facility] ICD-10-CM: I21.4  ICD-9-CM: 410.70  5/27/2021 - Present        Severe protein-calorie malnutrition ([facility] ICD-10-CM: E43  ICD-9-CM: 262  5/21/2021 - Present        Acute on chronic clinical systolic heart failure ([facility].) ICD-10-CM: I50.23  ICD-9-CM: 428.23  5/20/2021 - Present        Active advance directive on file ICD-10-CM: Z78.9  ICD-9-CM: V49.89  3/7/2017 - Present        Type 2 diabetes, controlled, with peripheral circulatory disorder ([facility] ICD-10-CM: E11.51  ICD-9-CM: 250.70, 443.81  2/10/2016 - Present        CHF, stage C ([facility] ICD-10-CM: I50.9  ICD-9-CM: 428.0  8/6/2015 - Present        Mixed hyperlipidemia ICD-10-CM: E78.2  ICD-9-CM: 272.2  8/6/2015 - Present        Proteinuria ICD-10-CM: R80.9  ICD-9-CM: 791.0  6/19/2014 - Present        BPH without obstruction/lower urinary tract symptoms ICD-10-CM: N40.0  ICD-9-CM: 600.00  6/11/2014 - Present        Hematuria, gross ICD-10-CM: R31.0  ICD-9-CM: 599.71  2/19/2013 - Present        Cardiac arrest [facility]) ICD-10-CM: I46.9  ICD-9-CM: 427.5  2/4/2013 - Present        Insomnia, unspecified ICD-10-CM: G47.00  ICD-9-CM: 780.52  7/23/2012 - Present        Encounter for long-term (current) use of other medications ICD-10-CM: Z79.899  ICD-9-CM: V58.69  3/26/2012 - Present        Calculus of kidney ICD-10-CM: N20.0  ICD-9-CM: 592.0  3/22/2010 - Present        Coronary atherosclerosis of native coronary artery ICD-10-CM: I25.10  ICD-9-CM: 414.01  3/22/2010 - Present        Benign neoplasm of colon ICD-10-CM: D12.6  ICD-9-CM: 211.3  3/22/2010 - Present        Unspecified sleep apnea ICD-10-CM: G47.30  ICD-9-CM: 780.57  3/22/2010 - Present        Reflux esophagitis ICD-10-CM: K21.00  ICD-9-CM: 530.11  3/22/2010 - Present        RESOLVED: Type II or unspecified type diabetes mellitus without mention of complication, not stated as uncontrolled ICD-10-CM: E11.9  ICD-9-CM: 250.00  9/2/2014 - 2/10/2016        RESOLVED: Long term (current) use of anticoagulants ICD-10-CM: Z79.01  ICD-9-CM: V58.61  2/23/2011 - 12/5/2012        RESOLVED: Congestive heart failure, unspecified ICD-10-CM: I50.9  ICD-9-CM: 428.0  3/22/2010 - 8/6/2015        RESOLVED: Other and unspecified hyperlipidemia ICD-10-CM: E78.5  ICD-9-CM: 272.4  3/22/2010 - 8/6/2015        RESOLVED: DM w/ Complication SYO-01-II: M95.2  ICD-9-CM: 250.90  3/22/2010 - 9/2/2014              Greater than 30 minutes were spent with the patient on counseling and coordination of care    Signed:    Adarsh Bazan MD  5/28/2021  5:23 PM

## 2021-05-28 NOTE — DISCHARGE INSTRUCTIONS
You are scheduled for your lung and bone marrow biopsy on June 8th at 11am, please arrive at 10am to [address] [facility] in the [address] [place] [address]. Bring License, insurance card and list of medications. You will need transportation to and from. No eating or drinking after midnight except for your medications           Discharge Instructions       PATIENT ID: Vilma Griffith  MRN: [id number]   [facility]: 1945    DATE OF ADMISSION: 5/26/2021 10:15 PM    DATE OF DISCHARGE: 5/28/2021    PRIMARY CARE PROVIDER: Pravin Ko MD     ATTENDING PHYSICIAN: Felipa Barba MD  DISCHARGING PROVIDER: Mily Ortiz MD    To contact this individual call [phone] and ask the  to page. If unavailable ask to be transferred the Adult Hospitalist Department. DISCHARGE DIAGNOSES and CARE RECOMMENDATIONS:   Non-ST elevation myocardial infarction. You were evaluated by cardiologist and advanced heart failure team.  Continued medical management recommended. You need to follow-up with advanced heart failure team as scheduled on 6/1. Continue taking your home medications as before. A new medication called Ranexa is added per advanced heart failure team        DIET: Regular Diet, Cardiac Diet and Diabetic Diet      ACTIVITY: Activity as tolerated        SERVICES and EQUIPMENT needed: Home health services to assist infusion management.     PENDING TEST RESULTS:   At the time of discharge the following test results are still pending: None    FOLLOW UP APPOINTMENTS:   Follow-up Information     Follow up With Specialties Details Why [address] [id number] [facility]  [address] [place] [address]  [phone]    [address] [place] [address] Cardiology On 6/1/2021 [address] [id number]  [phone]    Ramu Bull MD Internal Medicine   [address] [facility]  [address] [address] [facility] [phone]               YOU WERE SEEN BY THE FOLLOWING CONSULTATIONS:   IP CONSULT TO HOSPITALIST  IP CONSULT TO CARDIOLOGY  IP CONSULT TO CARDIOLOGY    YOU HAD THE FOLLOWING PROCEDURES/SURGERIES  :   * No surgery found *              DISCHARGE MEDICATIONS:***   See Medication Reconciliation Form    · It is important that you take the medication exactly as they are prescribed. · Keep your medication in the bottles provided by the pharmacist and keep a list of the medication names, dosages, and times to be taken in your wallet. · Do not take other medications without consulting your doctor. NOTIFY YOUR PHYSICIAN FOR ANY OF THE FOLLOWING:   Fever over 101 degrees for 24 hours. Chest pain, shortness of breath, fever, chills, nausea, vomiting, diarrhea, change in mentation, falling, weakness, bleeding. Severe pain or pain not relieved by medications. Or, any other signs or symptoms that you may have questions about. Signed:    Joe Guidry MD  5/28/2021  5:20 PM

## 2021-05-29 LAB
APTT PPP: 113.4 SEC (ref 22.1–31)
THERAPEUTIC RANGE,PTTT: ABNORMAL SECS (ref 58–77)

## 2021-06-01 ENCOUNTER — PATIENT OUTREACH (OUTPATIENT)
Dept: CASE MANAGEMENT | Age: 76
End: 2021-06-01

## 2021-06-01 ENCOUNTER — OFFICE VISIT (OUTPATIENT)
Dept: CARDIOLOGY CLINIC | Age: 76
End: 2021-06-01
Payer: MEDICARE

## 2021-06-01 VITALS
SYSTOLIC BLOOD PRESSURE: 124 MMHG | RESPIRATION RATE: 16 BRPM | TEMPERATURE: 97.7 F | WEIGHT: 193.6 LBS | OXYGEN SATURATION: 97 % | HEIGHT: 73 IN | HEART RATE: 72 BPM | DIASTOLIC BLOOD PRESSURE: 72 MMHG | BODY MASS INDEX: 25.66 KG/M2

## 2021-06-01 DIAGNOSIS — Z79.01 CHRONIC ANTICOAGULATION: ICD-10-CM

## 2021-06-01 DIAGNOSIS — I50.9 CHRONIC CONGESTIVE HEART FAILURE, UNSPECIFIED HEART FAILURE TYPE (HCC): Primary | ICD-10-CM

## 2021-06-01 LAB
APTT PPP: 25 SEC (ref 22.1–31)
ERYTHROCYTE [DISTWIDTH] IN BLOOD BY AUTOMATED COUNT: 20.3 % (ref 11.5–14.5)
HCT VFR BLD AUTO: 43.5 % (ref 36.6–50.3)
HGB BLD-MCNC: 13.6 G/DL (ref 12.1–17)
INR PPP: 1.1 (ref 0.9–1.1)
MCH RBC QN AUTO: 23.6 PG (ref 26–34)
MCHC RBC AUTO-ENTMCNC: 31.3 G/DL (ref 30–36.5)
MCV RBC AUTO: 75.5 FL (ref 80–99)
NRBC # BLD: 0 K/UL (ref 0–0.01)
NRBC BLD-RTO: 0 PER 100 WBC
PLATELET # BLD AUTO: 215 K/UL (ref 150–400)
PROTHROMBIN TIME: 11.4 SEC (ref 9–11.1)
RBC # BLD AUTO: 5.76 M/UL (ref 4.1–5.7)
THERAPEUTIC RANGE,PTTT: NORMAL SECS (ref 58–77)
WBC # BLD AUTO: 4.5 K/UL (ref 4.1–11.1)

## 2021-06-01 PROCEDURE — G8510 SCR DEP NEG, NO PLAN REQD: HCPCS | Performed by: INTERNAL MEDICINE

## 2021-06-01 PROCEDURE — G8417 CALC BMI ABV UP PARAM F/U: HCPCS | Performed by: INTERNAL MEDICINE

## 2021-06-01 PROCEDURE — 1101F PT FALLS ASSESS-DOCD LE1/YR: CPT | Performed by: INTERNAL MEDICINE

## 2021-06-01 PROCEDURE — 99215 OFFICE O/P EST HI 40 MIN: CPT | Performed by: INTERNAL MEDICINE

## 2021-06-01 PROCEDURE — 1111F DSCHRG MED/CURRENT MED MERGE: CPT | Performed by: INTERNAL MEDICINE

## 2021-06-01 PROCEDURE — G8427 DOCREV CUR MEDS BY ELIG CLIN: HCPCS | Performed by: INTERNAL MEDICINE

## 2021-06-01 PROCEDURE — G8536 NO DOC ELDER MAL SCRN: HCPCS | Performed by: INTERNAL MEDICINE

## 2021-06-01 PROCEDURE — 3017F COLORECTAL CA SCREEN DOC REV: CPT | Performed by: INTERNAL MEDICINE

## 2021-06-01 NOTE — PROGRESS NOTES
Care Transitions Initial Call    Call within 2 business days of discharge: Yes     Patient: Lalo Tolentino Sr Patient : 1945 MRN: [id number]    Last Discharge [address]       Complaint Diagnosis Description Type Department Provider    21 Chest Pain Acute chest pain . .. ED to Hosp-Admission (Discharged) (ADMIT) Saturnino Romero MD; Romayne Montenegro. .. Was this an external facility discharge? No Discharge Facility: n/a    Challenges to be reviewed by the provider   Additional needs identified to be addressed with provider: no  none         Method of communication with provider : none    Discussed COVID-19 related testing which was not done at this time. Test results were not done. Patient informed of results, if available? n/a     Inpatient Readmission Risk score: Unplanned Readmit Risk Score: 15    Was this a readmission? yes   Patient stated reason for the admission: chest pain    Patients top risk factors for readmission: medical condition-HF with eval for LVAD placement on milrinone drip   Interventions to address risk factors: Obtained and reviewed discharge summary and/or continuity of care documents, Reviewed and followed up on pending diagnostic tests and treatments-bx scheduled on , wife will schedule dentist appt as directed. and Education of patient/family/caregiver/guardian to support self-management-continue to check BP at home along with daily weights has parameters for contacting [facility]    Care Transition Nurse (CTN) contacted the family by telephone to perform post hospital discharge assessment. Verified name and  with family as identifiers. Provided introduction to self, and explanation of the CTN role. CTN reviewed discharge instructions, medical action plan and red flags with family who verbalized understanding. Were discharge instructions available to patient? yes.  Reviewed appropriate site of care based on symptoms and resources available to patient including: PCP and Specialist. Family given an opportunity to ask questions and does not have any further questions or concerns at this time. The family agrees to contact the PCP office for questions related to their healthcare. Medication reconciliation was performed with patient, who verbalizes understanding of administration of home medications. Discussed med changes   Referral to Pharm D needed: no     Home Health/Outpatient orders at discharge: home health care  [address] [place] [address] Way: cardiac connections   Date of initial visit: resumption    Discussed follow-up appointments. If no appointment was previously scheduled, appointment scheduling offered: n/a. Is follow up appointment scheduled within 7 days of discharge? yes. [address] follow up appointment(s):   Patient seen at [facility] today- Dr. Spencer Dial   Date Time Provider Arya Doherty   6/2/2021  2:30 PM Lashae Mae MD [facility]  AMB   6/8/2021 11:00 AM [facility] CT MAIN 1 [facility]   6/16/2021 10:30 AM Echo Anguiano MD [facility] follow up appointment(s): Will scheduled for dental follow appt. Plans to start cardiac rehab at [address]  Has referral for nutrition. Plan for follow-up call in 7-10 days based on severity of symptoms and risk factors. Plan for next call: self management-managing symptoms/red flags and follow up appointment-attend as directed  CTN provided contact information for future needs. Goals Addressed                 This Visit's Progress     Attends follow-up appointments as directed. 06/01/21  [address] follow up appointment(s):   Patient seen at [facility] today- Dr. Johanna Ortega   Date Time Provider Arya Doherty   6/2/2021  2:30 PM MD HANG Adams  AMB   6/8/2021 11:00 AM [facility] CT MAIN 1 [facility]   6/16/2021 10:30 AM Echo Anguiano MD [facility]     [facility]s): Will scheduled for dental follow appt.   Plans to start cardiac rehab at [facility]  Has referral for nutrition. 5/26/2021  [facility] follow up appointment(s):   Future Appointments   Date Time Provider Arya Doherty   5/27/2021  2:00 PM Cj Gustafson MD [facility] BS AMB   6/1/2021 10:30 AM Toribio Goodpasture, [facility]          Prevent complications post hospitalization. 06/01/21   Continues to keep BP/pulse log   Daily weights   Activity- walking, try to increase stamina. Plan to start cardiac rehab.    5/26/2021  Plan is for patient to get work up for possible LVAD. Needs lung bx, bone marrow bx, and colonoscopy per notes.

## 2021-06-01 NOTE — PATIENT INSTRUCTIONS
No Medication changes:      Testing Ordered:  Lab work has been drawn today. You will be contacted with any abnormal results requiring changes to your current plan of care. Other Recommendations:   Dr. Cris Begum recommends that you build stamina by gradually increasing to a 30 minute walk. A referral to Nutrition has been placed. Please contact [phone] to schedule an appointment. Follow up with Cardiac Rehab. Cardiac rehab will call and schedule an appointment. Follow up with Sleep Medicine and Dentistry at the [facility].      Ensure your drinking an adequate amount of water with a goal of 6-8 eight ounce glasses (1.5-2 liters) of fluid daily. Your urine should be clear and light yellow straw colored. If your blood pressure begins to consistently run below 90/60 and/or you begin to experience dizziness or lightheadedness, please contact the [facility] at [phone]. Follow up 2 weeks with [facility]      Please monitor your weights daily upon waking and after using the bathroom. Keep a written records of your weights and bring to your next appointment. If you have a weight gain of 3 or more pounds overnight OR 5 or more pounds in one week please contact our office. Thank you for allowing us the privilege of being a part of your healthcare team! Please do not hesitate to contact our office at [phone] with any questions or concerns. Virtual Heart Failure [address] invites you to learn more about heart failure and to share your questions, ideas, and experiences with others. Each month, the Heart Failure Support Group features a new educational topic and a guest speaker, followed by an interactive discussion. Our Heart Failure Nurse Navigator will moderate each session. You will be able to participate by phone, tablet or computer through [address] [place] [address].  This support group takes place on the 3rd Thursday of each month from 6:00-7:30PM. All individuals living with heart failure and their caregivers are welcome to join. If you are interested in participating, please contact us at [email or web address] and you will be sent the link to join the [email or web address].

## 2021-06-01 NOTE — PROGRESS NOTES
[facility] in [facility], [address] Note    Patient name: Vilma Griffith  Patient : 1945  Patient MRN: [id number]  Date of service: 21    Primary care physician: Pravin Ko MD  Primary general cardiologist:  Dr. Ann Mariscal    Primary [facility] cardiologist: Mary Doshi MD    PLAN OF CARE:  · Severe ischemic cardiomyopathy, LVEF 20-25%; stage D, NYHA class IV symptoms  · Patient undergoing LVAD-DT evaluation; pending items include lung nodule biopsy, bone marrow biopsy and EGD/C-scope  · Started on chronic palliative infusion of milrinone to optimize RV function, nutritional status and muscle conditioning and complete the remainder of workup  · Remember: LV core to go for congo stain + need records why patient has IVC filter     PLAN:  Continue current medical therapy for heart failure  Continue milrinone 0.3mcg/kg/min dosed to 88.6kg  Discontinued coreg due to RV dysfunction and in ancitipation of surgery  Discontinued ACEi/ARB/ARNi in anticipation of cardiac surgery  Continue current dose of eplerenone 50mg daily  Patient is not taking SGLT2 inhibitor; HgbA1C 7; consider starting OP  Change bumex to 1mg daily  Not on allopurinol or uloric, uric acid level wnl  Continue baby ASA   Holding effient prior to biopsy (note: may need ongoing washout prior to surgery)  Continue current dose of statin, check lipid profile, CPK and LFT  Continue ranolazine (for HR and angina control) and imdur 30mg daily  Check ICD interrogation; records from EP cardiology at [facility] scanned in epic  Continue CPAP therapy  Schedule cardiac rehabiliation  Routine HF labs monthly  May need sleep study repeated; patient will check  Advanced care plan forms on file  Referral to nutritionist for supplements with diabetes  Genetic testing pending  Need records from hematology re: indication for DVT filter?   [facility] with NP/MD      IMPRESSION:  Fatigue at rest  Shortness of breath with minimal exertion  Volume overload  Acute on chronic systolic heart failure  · Stage D, NYHA class IV symptoms improved to class II on inotropes  · Non-ischemic cardiomyopathy, LVEF 20% with LVEDD 6.2  · RV dysfunction, TAPSE 1.22 (prelimnary read)  · TBili 1.5 likely from cardiac congestion  At risk of sudden cardiac death  · Recent cardiac arrest   · S/p ICD (1/2013, [employer] followed by [facility])  Coronary artery disease  · S/p multiple interventions  · S/p 4V CABG (8/2012)  · LHC (7/2019) severe stenosis of LIMA to LAD anastomosis site, SVG graft to OM is occluded, SVG graft to RCA 40-50%, LAD occluded proximally, severe proximal LCx, RCA is the long . · PET (6/2019) EF 24% with anterior lateral and inferior reversible defect  Cardiac risk factors  · HTN  · HL  · DM2  · SRUTHI on CPAP  · MIld carotid stenosis  Anemia, microcytic  · Iron deficiency  DVT with filter  Pulmonary nodules   Dysphagia      CARDIAC IMAGING:  Echo (5/20/21)  · LV: Estimated LVEF is 20 - 25%. Normal wall thickness. Mildly dilated left ventricle. Severely and globally reduced systolic function. Severe (grade 4) left ventricular diastolic dysfunction. · LA: Severely dilated left atrium. · RA: Severely dilated right atrium. · MV: Moderate mitral valve regurgitation is present. · TV: Moderate tricuspid valve regurgitation is present. · AO: Mild aortic root dilatation. · RV: Pacer/ICD present. · LVED 6.2cm  EKG (5/20/21) SB with 1degree AV block, QRS 116ms     [facility] 5/24/21 Native Coronaries: LM - moderate to severe distal disease 50%. % prox occluded (), heavily calcified, LCx: 80% proximal stenosis. OM1 is  (seen filling faintly via collaterals). OM2 99% stenosis  RCA: 100% proximal occluded.   LIMA to LAD: patent, supplies only a diagonal branch (probably D2).  The LAD distal to the bifurcation is 100% occluded () and fills via right to left collaterals off the SVG to   RCA.   SVG to R-PDA: patent with moderate diffuse irregularities but no obstructive disease in the graft.    No appealing interventional targets.      NST as above     ICD interrogation (5/12/21) [employer] single lead, no events, normal device function and good battery     HEMODYNAMICS:  RHC 5/24/21 CI 1.97, PA 33/9/17, RA 1, PCWP 6- off milrinone   CPEST not done  6MW ordered     OTHER IMAGING:  CXR (6/17/21) clear  CT 5/21/21 1. Irregular pulmonary nodule in the left upper lobe measuring 2 cm, suspicious for primary pulmonary malignancy. 2. Additional 6 mm left upper lobe pulmonary nodule. 3. Severe calcific atherosclerosis of the coronary arteries and abdominal aorta. No aneurysm. 4. Cardiomegaly. 5. No acute abnormality within the chest, abdomen, or pelvis.     HISTORY OF PRESENT ILLNESS:  I had the pleasure of seeing Heron Oconnell Sr in [facility] at [address] [place] [address] in [address] [place] [address] a 77 y. o. male with h/o HTN, HL, DM2, SRUTHI on CPAP, chronic systolic heart failure, stage D, NYHA class IV symptoms, non-ischemic cardiomyopathy, LVEF 20% with LVEDD 6.2, RV dysfunciton, TAPSE 1.22, TBili 1.5 likely from cardiac congestion, recent cardiac arrest s/p ICD (1/2013, [employer] followed by [facility]), coronary artery disease s/p multiple interventions s/p 4V CABG (8/2012), LHC (7/2019) severe stenosis of LIMA to LAD anastomosis site, SVG graft to OM is occluded, SVG graft to RCA 40-50%, LAD occluded proximally, severe proximal LCx, RCA is the long . PET (6/2019) EF 24% with anterior lateral and inferior reversible defect. Anemia, microcytic with iron deficiency, DVT with filter.     Patient was referred to A[facility] Clinic by Dr. Andrzej Nunez for evaluation of his candidacy for advanced therapies. Patient agreed to inpatient initiation of palliative infusion of chronic inotropes and evaluation for LVAD-DT.  Patient was admitted 5/2-5/25/21. Patient was started on milrinone infusion and had first part of LVAD evaluation completed. Right and left heart cath on 5/24/21 that showed severe diffuse native vessel CAD, with patent grafts (LIMA to D2, SVG to RCA). Antiplatelet therapy was held during hospitalization and after discharge due to anticipated pulmonary nodule biopsy, bone marrow biopsy and EGD/C-Scope as part of LVAD workup.     Patient was readmitted 5/26-5/28/21 with hypertension, headache and chest pain, his troponin peaked at 10; he was shortly bridged with heparin, otherwise had normal EKG and chest CT negative for PE. Cardiology and AHF service was consulted and patient was discharged home after troponin came down. INTERVAL HISTORY:  Today, patient presents for routine clinic visit. Patient is doing well though feeling tired. Patient walked to our clinic from parking garage without having to slow down or stop. Patient can walk more than one block without symptoms of fatigue or shortness of breath or chest pain. Patient can walk one flight of stairs without symptoms of fatigue or shortness of breath or chest pain. Patient can perform home activities without problem and routinely participates in daily walking for more than 15 minutes. Patient denies symptoms of volume overload or leg edema. Patient denies abdominal bloating or change of appetite. Patient's weight remained stable. Patient denies orthopnea, PND or nocturia. Patient denies irregular heart rate or palpitations. No presyncope or syncope. ICD has not fired. Patient denies other cardiac symptoms such as chest pain or leg pain with walking. Patient is compliant with fluid restriction and taking medications as prescribed. Patient manages his own medications. REVIEW OF SYSTEMS:  General: Denies fever, night sweats.   Ear, nose and throat: Denies difficulty hearing, sinus problems, runny nose, post-nasal drip, ringing in ears, mouth sores, loose teeth, ear pain, nosebleeds, sore throate, facial pain or numbess  Cardiovascular: see above in the interval history  Respiratory: Denies cough, wheezing, sputum production, hemoptysis. Gastrointestinal: Denies heartburn, constipation, diarrhea, abdominal pain, nausea, vomiting, difficulty swallowing, blood in stool  Kidney and bladder: Denies painful urination, frequent urination, urgency  Musculoskeletal: Denies joint pain, muscle weakness  Skin and hair: Denies change in existing skin lesions, hair loss or increase, breast changes    PHYSICAL EXAM:  Visit Vitals  /72 (BP 1 Location: Left arm, BP Patient Position: Sitting, BP Cuff Size: Adult)   Pulse 72   Temp 97.7 °F (36.5 °C)   Resp 16   Ht 6' 1\" (1.854 m)   Wt 193 lb 9.6 oz (87.8 kg)   SpO2 97%   BMI 25.54 kg/m²     General: Patient is well developed, well-nourished in no acute distress  HEENT: Normocephalic and atraumatic. No scleral icterus. Pupils are equal, round and reactive to light and accomodation. No conjunctival injection. Oropharynx is clear. Neck: Supple. No evidence of thyroid enlargements or lymphadenopathy. JVD: Cannot be appreciated   Lungs: Breath sounds are equal and clear bilaterally. No wheezes, rhonchi, or rales. Heart: Regular rate and rhythm with normal S1 and S2. No murmurs, gallops or rubs. Abdomen: Soft, no mass or tenderness. No organomegaly or hernia. Bowel sounds present. Genitourinary and rectal: deferred  Extremities: No cyanosis, clubbing, or edema. Neurologic: No focal sensory or motor deficits are noted. Grossly intact. Psychiatric: Awake, alert an doriented x 3. Appropriate mood and affect. Skin: Warm, dry and well perfused. No lesions, nodules or rashes are noted.     PAST MEDICAL HISTORY:  Past Medical History:   Diagnosis Date    CAD (coronary artery disease) '90 '97, '13 x 2    MI, code ice in 2013 at [facility]    Calculus of kidney     Colonic polyps     Congestive heart failure, unspecified  Diabetes ([facility] [address]     Gastritis     Hypercholesterolemia     Sleep apnea        PAST SURGICAL HISTORY:  Past Surgical History:   Procedure Laterality Date    COLONOSCOPY  04/06/2011    16, due 21    ENDOSCOPY, COLON, DIAGNOSTIC      11, due 16    HX CORONARY ARTERY BYPASS GRAFT  8/22/12    x 4 vessel by MISSY LION    HX PACEMAKER PLACEMENT  1/30/13    NM CARDIAC SURG PROCEDURE UNLIST  2012    x 4 vessels       FAMILY HISTORY:  Family History   Problem Relation Age of Onset    Heart Disease Mother         MI    Heart Disease Father         CAD & PVD    Lung Disease Father     Cancer Father         lung    Diabetes Maternal Grandmother     Heart Disease Other         CAD    Stroke Sister        SOCIAL HISTORY:  Social History     Socioeconomic History    Marital status:      Spouse name: Not on file    Number of children: Not on file    Years of education: Not on file    Highest education level: Not on file   Tobacco Use    Smoking status: Never Smoker    Smokeless tobacco: Never Used   Substance and Sexual Activity    Alcohol use: Yes     Alcohol/week: 0.0 standard drinks     Comment:  VERY RARE    Drug use: No     Social Determinants of Health     Financial Resource Strain:     Difficulty of Paying Living Expenses:    Food Insecurity:     Worried About Running Out of Food in the Last Year:     [address] in the Last Year:    Transportation Needs:     Lack of Transportation (Medical):      Lack of Transportation (Non-Medical):    Physical Activity:     Days of Exercise per Week:     Minutes of Exercise per Session:    Stress:     Feeling of Stress :    Social Connections:     Frequency of Communication with Friends and Family:     Frequency of Social Gatherings with Friends and Family:     Attends [employer] Services:     Active Member of Clubs or Organizations:     Attends Club or Organization Meetings:     Marital Status:        LABORATORY RESULTS:  Labs Latest Ref Rng & Units 5/28/2021 5/26/2021 5/25/2021 5/24/2021 5/23/2021 5/23/2021 5/22/2021   WBC 4.1 - 11.1 K/uL 3.4(L) 4.4 4. 0(L) 3. 6(L) - 3. 0(L) -   RBC 4.10 - 5.70 M/uL 5.49 5.72(H) 5.52 5.78(H) - 5.51 -   Hemoglobin 12.1 - 17.0 g/dL 12.9 13.6 12.8 13.4 - 12.8 -   Hematocrit 36.6 - 50.3 % 41.8 43.2 41.4 43.7 - 40.9 -   MCV 80.0 - 99.0 FL 76. 1(L) 75. 5(L) 75. 0(L) 75. 6(L) - 74. 2(L) -   Platelets 409 - 476 K/uL 165 160 158 152 - 135(L) -   Lymphocytes 12 - 49 % 35 19 - - - - -   Monocytes 5 - 13 % 16(H) 11 - - - - -   Eosinophils 0 - 7 % 3 1 - - - - -   Basophils 0 - 1 % 1 1 - - - - -   Albumin 3.5 - 5.0 g/dL 3. 3(L) 3.6 3. 2(L) 3.4(L) - 3. 4(L) -   Calcium 8.5 - 10.1 MG/DL 9.2 9.6 8.9 9.6 9.1 9.2 9.2   Glucose 65 - 100 mg/dL 217(H) 281(H) 177(H) 125(H) 187(H) 128(H) 188(H)   BUN 6 - 20 MG/DL 14 12 13 16 14 15 15   Creatinine 0.70 - 1.30 MG/DL 0.90 1.00 0.97 1.10 1.13 1.01 1.23   Sodium 136 - 145 mmol/L 133(L) 132(L) 134(L) 135(L) 135(L) 135(L) 138   Potassium 3.5 - 5.1 mmol/L 4.2 4.4 4.7 4.1 4.1 3.1(L) 3.7   TSH 0.36 - 3.74 uIU/mL - 1.47 - - - - -   PSA 0.01 - 4.0 ng/mL - - - - - - -   LDH 85 - 241 U/L - - - - - - -   Some recent data might be hidden       ALLERGY:  Allergies   Allergen Reactions    Pcn [Penicillins] Hives        CURRENT MEDICATIONS:    Current Outpatient Medications:     ranolazine ER (RANEXA) 500 mg SR tablet, Take 1 Tablet by mouth two (2) times a day for 30 days. , Disp: 60 Tablet, Rfl: 0    eplerenone (INSPRA) 50 mg tab tablet, Take 1 Tablet by mouth daily. , Disp: 90 Tablet, Rfl: 1    isosorbide mononitrate ER (IMDUR) 30 mg tablet, Take 1 Tablet by mouth every twelve (12) hours. , Disp: 180 Tablet, Rfl: 1    ergocalciferol (ERGOCALCIFEROL) 1,250 mcg (50,000 unit) capsule, Take 1 Capsule by mouth every seven (7) days for 11 doses. , Disp: 11 Capsule, Rfl: 0    hydrALAZINE (APRESOLINE) 50 mg tablet, Take 1 Tablet by mouth three (3) times daily. , Disp: 180 Tablet, Rfl: 2   magnesium oxide (MAG-OX) 400 mg tablet, Take 1 Tablet by mouth daily. , Disp: 90 Tablet, Rfl: 1    milrinone (PRIMACOR) 20 mg/100 mL (200 mcg/mL) infusion, 26.58 mcg/min by IntraVENous route continuous. , Disp: 100 mL, Rfl: 0    potassium chloride SR (K-TAB) 20 mEq tablet, Take 2 Tablets by mouth two (2) times a day., Disp: 240 Tablet, Rfl: 1    rosuvastatin (CRESTOR) 10 mg tablet, TAKE 1 TABLET NIGHTLY, Disp: 90 Tab, Rfl: 3    ferrous sulfate (Iron) 325 mg (65 mg iron) tablet, Take  by mouth Daily (before breakfast). , Disp: , Rfl:     omeprazole (PRILOSEC) 20 mg capsule, Take 1 Cap by mouth daily. Indications: indigestion (Patient taking differently: Take 20 mg by mouth as needed. Indications: indigestion), Disp: 30 Cap, Rfl: 1    tamsulosin (FLOMAX) 0.4 mg capsule, TAKE 1 CAPSULE DAILY, Disp: 90 Cap, Rfl: 3    glipiZIDE SR (GLUCOTROL XL) 10 mg CR tablet, TAKE 1 TABLET DAILY (DUE FOR OFFICE VISIT PLEASE CALL OFFICE TO SCHEDULE), Disp: 30 Tab, Rfl: 0    glucose blood VI test strips (OneTouch Ultra Blue Test Strip) strip, Use to test blood sugar daily, Disp: 100 Strip, Rfl: 11    lancets misc, Use to test blood sugar daily, Disp: 100 Each, Rfl: 11    bumetanide (BUMEX) 2 mg tablet, Take  by mouth. 1/2 tab every other day., Disp: , Rfl: 0    acetaminophen (TYLENOL EXTRA STRENGTH) 500 mg tablet, Take  by mouth every six (6) hours as needed. , Disp: , Rfl:     aspirin delayed-release 81 mg tablet, Take 81 mg by mouth daily. , Disp: , Rfl:     ondansetron hcl (ZOFRAN) 4 mg tablet, Take 1 Tab by mouth every eight (8) hours as needed for Nausea, Vomiting or Nausea or Vomiting. (Patient not taking: Reported on 6/1/2021), Disp: 20 Tab, Rfl: 1    Thank you for your referral and allowing me to participate in this patient's care.     Angel Jaimes MD PhD  [address] [place] [address], [address]  Phone: [phone]  Fax: [phone]    PATIENT CARE TEAM:  Patient Care Team:  Doug España MD as PCP - Missy Caicedo MD as PCP - [facility] Empaneled Provider  Senait Castellano MD as Physician (Cardiology)  Tito Bey MD as Physician (Cardiology)  Karishma Waite MD as Physician (Gastroenterology)  Klever Erickson MD as Physician (Orthopedic Surgery)  Teresita Garcia MD as Physician (Ophthalmology)  Inetta Rudd Wilms, MD as Physician ([address])  Debbie Hanna MD (Cardiology)  Cristiana Chua MD (Cardiology)  Belgica Collier RN as Care Transitions Nurse     Total visit time: 40 minutes (> 50% spent face-to-face counseling)

## 2021-06-02 ENCOUNTER — OFFICE VISIT (OUTPATIENT)
Dept: INTERNAL MEDICINE CLINIC | Age: 76
End: 2021-06-02
Payer: MEDICARE

## 2021-06-02 VITALS
BODY MASS INDEX: 25.58 KG/M2 | OXYGEN SATURATION: 98 % | RESPIRATION RATE: 20 BRPM | WEIGHT: 193 LBS | HEIGHT: 73 IN | TEMPERATURE: 97.1 F | SYSTOLIC BLOOD PRESSURE: 105 MMHG | DIASTOLIC BLOOD PRESSURE: 65 MMHG | HEART RATE: 49 BPM

## 2021-06-02 DIAGNOSIS — I50.9 CHF, STAGE C (HCC): Primary | ICD-10-CM

## 2021-06-02 DIAGNOSIS — I25.10 ATHEROSCLEROSIS OF NATIVE CORONARY ARTERY OF NATIVE HEART WITHOUT ANGINA PECTORIS: ICD-10-CM

## 2021-06-02 DIAGNOSIS — E11.51 TYPE 2 DIABETES, CONTROLLED, WITH PERIPHERAL CIRCULATORY DISORDER (HCC): ICD-10-CM

## 2021-06-02 DIAGNOSIS — D50.8 OTHER IRON DEFICIENCY ANEMIA: ICD-10-CM

## 2021-06-02 DIAGNOSIS — I24.9 ACS (ACUTE CORONARY SYNDROME) (HCC): ICD-10-CM

## 2021-06-02 PROCEDURE — G8427 DOCREV CUR MEDS BY ELIG CLIN: HCPCS | Performed by: INTERNAL MEDICINE

## 2021-06-02 PROCEDURE — 99496 TRANSJ CARE MGMT HIGH F2F 7D: CPT | Performed by: INTERNAL MEDICINE

## 2021-06-02 NOTE — PROGRESS NOTES
HISTORY OF PRESENT ILLNESS  Therese Velazquez is a 77 y.o. male. HPI  Assessment:  Giovanna Saunders is seen today accompanied by his wife Wes Cast for hospital follow up for recent admission for congestive heart failure and chest pain. He has actually had two admissions in late May, the second was on 5/26/21 and discharged on 5/28/21. Nurse navigator contact was within two business days. Office visit is today 6/2/21. This represents a transitional care visit. 1) CHF. [facility]. He is undergoing evaluation for the LVAD. Notes are fully documented. He is currently on intravenous milrinone. 2) Acute coronary syndrome. He presented with chest pain. Reviewed full workup and testing. He has had no recurrence of chest pain. 3) Anemia. They wonder about stopping iron and his hemoglobin was excellent. We will go ahead and stop the iron. 4) Lung lesion. Biopsy is pending for 6/8/21. Presumably at present there is the need for intravenous contrast. Blood sugars have been up a bit ranging from 165-196. He is on glipizide. Based on his baseline creatinine, he should be able to restart metformin after the procedure. I have asked them to follow up with me in two weeks. Current Outpatient Medications   Medication Sig    ranolazine ER (RANEXA) 500 mg SR tablet Take 1 Tablet by mouth two (2) times a day for 30 days.  eplerenone (INSPRA) 50 mg tab tablet Take 1 Tablet by mouth daily.  isosorbide mononitrate ER (IMDUR) 30 mg tablet Take 1 Tablet by mouth every twelve (12) hours.  ergocalciferol (ERGOCALCIFEROL) 1,250 mcg (50,000 unit) capsule Take 1 Capsule by mouth every seven (7) days for 11 doses.  hydrALAZINE (APRESOLINE) 50 mg tablet Take 1 Tablet by mouth three (3) times daily.  magnesium oxide (MAG-OX) 400 mg tablet Take 1 Tablet by mouth daily.     milrinone (PRIMACOR) 20 mg/100 mL (200 mcg/mL) infusion 26.58 mcg/min by IntraVENous route continuous.  potassium chloride SR (K-TAB) 20 mEq tablet Take 2 Tablets by mouth two (2) times a day.  ondansetron hcl (ZOFRAN) 4 mg tablet Take 1 Tab by mouth every eight (8) hours as needed for Nausea, Vomiting or Nausea or Vomiting.  rosuvastatin (CRESTOR) 10 mg tablet TAKE 1 TABLET NIGHTLY    omeprazole (PRILOSEC) 20 mg capsule Take 1 Cap by mouth daily. Indications: indigestion (Patient taking differently: Take 20 mg by mouth as needed. Indications: indigestion)    tamsulosin (FLOMAX) 0.4 mg capsule TAKE 1 CAPSULE DAILY    glipiZIDE SR (GLUCOTROL XL) 10 mg CR tablet TAKE 1 TABLET DAILY (DUE FOR OFFICE VISIT PLEASE CALL OFFICE TO SCHEDULE)    glucose blood VI test strips (OneTouch Ultra Blue Test Strip) strip Use to test blood sugar daily    lancets misc Use to test blood sugar daily    bumetanide (BUMEX) 2 mg tablet Take  by mouth. 1/2 tab every other day.  acetaminophen (TYLENOL EXTRA STRENGTH) 500 mg tablet Take  by mouth every six (6) hours as needed.  aspirin delayed-release 81 mg tablet Take 81 mg by mouth daily.  benzonatate (TESSALON) 200 mg capsule Take 1 Capsule by mouth three (3) times daily as needed for Cough. No current facility-administered medications for this visit. Review of Systems   Constitutional: Positive for malaise/fatigue. Negative for weight loss. Respiratory: Negative. Cardiovascular: Positive for chest pain. Negative for palpitations, leg swelling and PND. Musculoskeletal: Negative for myalgias. Neurological: Negative for focal weakness. All other systems reviewed and are negative. Physical Exam  Vitals and nursing note reviewed. Constitutional:       General: He is not in acute distress. Cardiovascular:      Rate and Rhythm: Normal rate and regular rhythm. Heart sounds: No murmur heard. No friction rub. No gallop. Pulmonary:      Effort: Pulmonary effort is normal.      Breath sounds: Normal breath sounds. ASSESSMENT and PLAN  Diagnoses and all orders for this visit:    1. CHF, stage C ([facility] [address]    2. ACS (acute coronary syndrome) ([facility] [address]    3. Type 2 diabetes, controlled, with peripheral circulatory disorder (HCC)    4. Other iron deficiency anemia    5.  Atherosclerosis of native coronary artery of native heart without angina pectoris

## 2021-06-03 ENCOUNTER — TELEPHONE (OUTPATIENT)
Dept: CARDIOLOGY CLINIC | Age: 76
End: 2021-06-03

## 2021-06-03 NOTE — TELEPHONE ENCOUNTER
Patient's wife called and stated patient was seen by the dentist today. Dentist is Dr. Efren Escobedo ([phone], [phone]). Beckiehafsaalbert Fam stated she called and made an appointment for Nutrition consultation. In addition, she called the [facility] and was told patient needed a new CPAP and mask, but did not need to be seen. Letter requesting dental records faxed to Dr. Edgard Lott. [facility].

## 2021-06-04 ENCOUNTER — TELEPHONE (OUTPATIENT)
Dept: CARDIOLOGY CLINIC | Age: 76
End: 2021-06-04

## 2021-06-04 RX ORDER — BENZONATATE 200 MG/1
200 CAPSULE ORAL
Qty: 30 CAPSULE | Refills: 2 | Status: SHIPPED | OUTPATIENT
Start: 2021-06-04 | End: 2021-08-10

## 2021-06-04 NOTE — TELEPHONE ENCOUNTER
Call received from patient's wife (PHI release auth verified). Patient verified by 2 patient identifiers. Wife stated patient had cough in hospital which he was given Tessalon for. She stated he is still having issues with the cough, and is inquiring if they can get refill of Tessalon. She reported the following vitals:    BP: 124/63  T: 96.9  Wt: 189lbs  Blood Sugar: 147    She stated patient denied any swelling, shortness of breath, fever, chills, chest pain, nausea or vomitting. She stated he had a small amount of clear sputum with the cough. She stated weight has been stable. Informed her will review with provider and contact her with further recommendations. Reviewed with ALICIA Wong NP who recommended [facility] refill of tessalon may be provided. Contacted patient to notify. He stated another provider sent prescription in. He had no further questions at this time. Maury Islas RN.

## 2021-06-08 ENCOUNTER — HOSPITAL ENCOUNTER (OUTPATIENT)
Dept: GENERAL RADIOLOGY | Age: 76
Discharge: HOME | End: 2021-06-08
Attending: RADIOLOGY
Payer: MEDICARE

## 2021-06-08 ENCOUNTER — HOSPITAL ENCOUNTER (OUTPATIENT)
Dept: CT IMAGING | Age: 76
Discharge: HOME | End: 2021-06-08
Attending: NURSE PRACTITIONER
Payer: MEDICARE

## 2021-06-08 VITALS
OXYGEN SATURATION: 96 % | TEMPERATURE: 98 F | BODY MASS INDEX: 24.26 KG/M2 | HEIGHT: 74 IN | DIASTOLIC BLOOD PRESSURE: 56 MMHG | SYSTOLIC BLOOD PRESSURE: 120 MMHG | HEART RATE: 72 BPM | WEIGHT: 189 LBS | RESPIRATION RATE: 15 BRPM

## 2021-06-08 DIAGNOSIS — R91.8 MULTIPLE LUNG NODULES ON CT: ICD-10-CM

## 2021-06-08 DIAGNOSIS — D61.818 PANCYTOPENIA (HCC): ICD-10-CM

## 2021-06-08 LAB
BASOPHILS # BLD: 0 K/UL (ref 0–0.1)
BASOPHILS NFR BLD: 1 % (ref 0–1)
DIFFERENTIAL METHOD BLD: ABNORMAL
EOSINOPHIL # BLD: 0 K/UL (ref 0–0.4)
EOSINOPHIL NFR BLD: 1 % (ref 0–7)
ERYTHROCYTE [DISTWIDTH] IN BLOOD BY AUTOMATED COUNT: 20.6 % (ref 11.5–14.5)
HCT VFR BLD AUTO: 41.5 % (ref 36.6–50.3)
HGB BLD-MCNC: 13 G/DL (ref 12.1–17)
IMM GRANULOCYTES # BLD AUTO: 0 K/UL (ref 0–0.04)
IMM GRANULOCYTES NFR BLD AUTO: 1 % (ref 0–0.5)
LYMPHOCYTES # BLD: 0.7 K/UL (ref 0.8–3.5)
LYMPHOCYTES NFR BLD: 19 % (ref 12–49)
MCH RBC QN AUTO: 23.9 PG (ref 26–34)
MCHC RBC AUTO-ENTMCNC: 31.3 G/DL (ref 30–36.5)
MCV RBC AUTO: 76.1 FL (ref 80–99)
MONOCYTES # BLD: 0.5 K/UL (ref 0–1)
MONOCYTES NFR BLD: 13 % (ref 5–13)
NEUTS SEG # BLD: 2.6 K/UL (ref 1.8–8)
NEUTS SEG NFR BLD: 65 % (ref 32–75)
NRBC # BLD: 0 K/UL (ref 0–0.01)
NRBC BLD-RTO: 0 PER 100 WBC
PLATELET # BLD AUTO: 195 K/UL (ref 150–400)
RBC # BLD AUTO: 5.45 M/UL (ref 4.1–5.7)
RBC MORPH BLD: ABNORMAL
WBC # BLD AUTO: 3.8 K/UL (ref 4.1–11.1)

## 2021-06-08 PROCEDURE — 77030003503 HC NDL BIOP TISS BD -B

## 2021-06-08 PROCEDURE — 85025 COMPLETE CBC W/AUTO DIFF WBC: CPT

## 2021-06-08 PROCEDURE — 88237 TISSUE CULTURE BONE MARROW: CPT

## 2021-06-08 PROCEDURE — 77030028872 HC BN BIOP NDL ON CNTRL TY TELE -C

## 2021-06-08 PROCEDURE — 88374 M/PHMTRC ALYS ISHQUANT/SEMIQ: CPT

## 2021-06-08 PROCEDURE — 88305 TISSUE EXAM BY PATHOLOGIST: CPT

## 2021-06-08 PROCEDURE — 88264 CHROMOSOME ANALYSIS 20-25: CPT

## 2021-06-08 PROCEDURE — 88185 FLOWCYTOMETRY/TC ADD-ON: CPT

## 2021-06-08 PROCEDURE — 99153 MOD SED SAME PHYS/QHP EA: CPT

## 2021-06-08 PROCEDURE — 38221 DX BONE MARROW BIOPSIES: CPT

## 2021-06-08 PROCEDURE — 88184 FLOWCYTOMETRY/ TC 1 MARKER: CPT

## 2021-06-08 PROCEDURE — 36415 COLL VENOUS BLD VENIPUNCTURE: CPT

## 2021-06-08 PROCEDURE — 88333 PATH CONSLTJ SURG CYTO XM 1: CPT

## 2021-06-08 PROCEDURE — 71045 X-RAY EXAM CHEST 1 VIEW: CPT

## 2021-06-08 PROCEDURE — 77030014115

## 2021-06-08 PROCEDURE — 88365 INSITU HYBRIDIZATION (FISH): CPT

## 2021-06-08 PROCEDURE — 88364 INSITU HYBRIDIZATION (FISH): CPT

## 2021-06-08 PROCEDURE — 88311 DECALCIFY TISSUE: CPT

## 2021-06-08 PROCEDURE — 88360 TUMOR IMMUNOHISTOCHEM/MANUAL: CPT

## 2021-06-08 PROCEDURE — 74011250636 HC RX REV CODE- 250/636: Performed by: RADIOLOGY

## 2021-06-08 PROCEDURE — 88313 SPECIAL STAINS GROUP 2: CPT

## 2021-06-08 PROCEDURE — 77030003666 HC NDL SPINAL BD -A

## 2021-06-08 PROCEDURE — 88342 IMHCHEM/IMCYTCHM 1ST ANTB: CPT

## 2021-06-08 RX ORDER — SODIUM CHLORIDE 9 MG/ML
50 INJECTION, SOLUTION INTRAVENOUS CONTINUOUS
Status: DISCONTINUED | OUTPATIENT
Start: 2021-06-08 | End: 2021-06-12 | Stop reason: HOSPADM

## 2021-06-08 RX ORDER — MIDAZOLAM HYDROCHLORIDE 1 MG/ML
5 INJECTION, SOLUTION INTRAMUSCULAR; INTRAVENOUS
Status: DISCONTINUED | OUTPATIENT
Start: 2021-06-08 | End: 2021-06-08

## 2021-06-08 RX ORDER — FENTANYL CITRATE 50 UG/ML
200 INJECTION, SOLUTION INTRAMUSCULAR; INTRAVENOUS
Status: DISCONTINUED | OUTPATIENT
Start: 2021-06-08 | End: 2021-06-08

## 2021-06-08 RX ADMIN — SODIUM CHLORIDE 50 ML/HR: 9 INJECTION, SOLUTION INTRAVENOUS at 11:28

## 2021-06-08 RX ADMIN — MIDAZOLAM HYDROCHLORIDE 1 MG: 1 INJECTION, SOLUTION INTRAMUSCULAR; INTRAVENOUS at 12:01

## 2021-06-08 RX ADMIN — FENTANYL CITRATE 25 MCG: 50 INJECTION, SOLUTION INTRAMUSCULAR; INTRAVENOUS at 12:16

## 2021-06-08 RX ADMIN — FENTANYL CITRATE 25 MCG: 50 INJECTION, SOLUTION INTRAMUSCULAR; INTRAVENOUS at 12:11

## 2021-06-08 RX ADMIN — MIDAZOLAM HYDROCHLORIDE 1 MG: 1 INJECTION, SOLUTION INTRAMUSCULAR; INTRAVENOUS at 12:13

## 2021-06-08 RX ADMIN — MIDAZOLAM HYDROCHLORIDE 1 MG: 1 INJECTION, SOLUTION INTRAMUSCULAR; INTRAVENOUS at 12:41

## 2021-06-08 RX ADMIN — FENTANYL CITRATE 25 MCG: 50 INJECTION, SOLUTION INTRAMUSCULAR; INTRAVENOUS at 12:02

## 2021-06-08 NOTE — ROUTINE PROCESS
Pt. Discharged to home and transported to d/c lot via w/c. Post lung and bone marrow bx d/c instructions reviewed with pt. and wife. Both verbalized understanding of instructions. Dr. Carlos Fontanez in to speak with pt. and wife about the procedure.

## 2021-06-08 NOTE — PROGRESS NOTES
Bone marrow bx is complete. Pt. Julisa Course in prone position and repositioned for CT guided lung bx.

## 2021-06-08 NOTE — H&P
Interventional Radiology History and Physical (Outpatient)    6/8/2021    Patient: Nery Granger Sr 77 y.o. male     Referring Physician:  Gera Moon NP    Chief Complaint: presents for CT guided left lung biopsy and bone marrow biopsy    History of Present Illness: left lung nodule; cytopenia    History:  Past Medical History:   Diagnosis Date    CAD (coronary artery disease) '90 '97, '13 x 2    MI, code ice in 2013 at [facility]    Calculus of kidney     Colonic polyps     Congestive heart failure, unspecified     Diabetes ([facility] [address]     Gastritis     Hypercholesterolemia     Sleep apnea      Family History   Problem Relation Age of Onset    Heart Disease Mother         MI    Heart Disease Father         CAD & PVD    Lung Disease Father     Cancer Father         lung    Diabetes Maternal Grandmother     Heart Disease Other         CAD    Stroke Sister      Social History     Socioeconomic History    Marital status:      Spouse name: Not on file    Number of children: Not on file    Years of education: Not on file    Highest education level: Not on file   Occupational History    Not on file   Tobacco Use    Smoking status: Never Smoker    Smokeless tobacco: Never Used   Substance and Sexual Activity    Alcohol use: Yes     Alcohol/week: 0.0 standard drinks     Comment:  VERY RARE    Drug use: No    Sexual activity: Not on file   Other Topics Concern    Not on file   Social History Narrative    Not on file     Social Determinants of Health     Financial Resource Strain:     Difficulty of Paying Living Expenses:    Food Insecurity:     Worried About Running Out of Food in the Last Year:     [address] in the Last Year:    Transportation Needs:     Lack of Transportation (Medical):      Lack of Transportation (Non-Medical):    Physical Activity:     Days of Exercise per Week:     Minutes of Exercise per Session:    Stress:     Feeling of Stress :    Social Connections:     Frequency of Communication with Friends and Family:     Frequency of Social Gatherings with Friends and Family:     Attends Church Services:     Active Member of Clubs or Organizations:     Attends Club or Organization Meetings:     Marital Status:    Intimate Partner Violence:     Fear of Current or Ex-Partner:     Emotionally Abused:     Physically Abused:     Sexually Abused: Allergies: Allergies   Allergen Reactions    Pcn [Penicillins] Hives       Prior to Admission Medications:  Prior to Admission medications    Medication Sig Start Date End Date Taking? Authorizing Provider   benzonatate (TESSALON) 200 mg capsule Take 1 Capsule by mouth three (3) times daily as needed for Cough. 6/4/21  Yes Hunter Gustafson MD   ranolazine ER (RANEXA) 500 mg SR tablet Take 1 Tablet by mouth two (2) times a day for 30 days. 5/28/21 6/27/21 Yes Liliam Saenz MD   eplerenone (INSPRA) 50 mg tab tablet Take 1 Tablet by mouth daily. 5/26/21  Yes Lydia Stewart NP   isosorbide mononitrate ER (IMDUR) 30 mg tablet Take 1 Tablet by mouth every twelve (12) hours. 5/25/21  Yes Marilee Stewart NP   ergocalciferol (ERGOCALCIFEROL) 1,250 mcg (50,000 unit) capsule Take 1 Capsule by mouth every seven (7) days for 11 doses. 5/28/21 8/7/21 Yes Marilee Stewart NP   hydrALAZINE (APRESOLINE) 50 mg tablet Take 1 Tablet by mouth three (3) times daily. 5/25/21  Yes Lydia Stewart NP   milrinone (PRIMACOR) 20 mg/100 mL (200 mcg/mL) infusion 26.58 mcg/min by IntraVENous route continuous. 5/25/21  Yes Marilee Stewart NP   potassium chloride SR (K-TAB) 20 mEq tablet Take 2 Tablets by mouth two (2) times a day.  5/25/21  Yes Danica Stewart NP   rosuvastatin (CRESTOR) 10 mg tablet TAKE 1 TABLET NIGHTLY 4/26/21  Yes Hunter Gustafson MD   tamsulosin (FLOMAX) 0.4 mg capsule TAKE 1 CAPSULE DAILY 3/28/21  Yes uHnter Gustafson MD   glipiZIDE SR (GLUCOTROL XL) 10 mg CR tablet TAKE 1 TABLET DAILY (DUE FOR OFFICE VISIT PLEASE CALL OFFICE TO SCHEDULE) 1/11/21  Yes Sahara Gustafson MD   bumetanide (BUMEX) 2 mg tablet Take  by mouth. 1/2 tab every other day. 6/19/19  Yes Provider, Historical   magnesium oxide (MAG-OX) 400 mg tablet Take 1 Tablet by mouth daily. 5/26/21   Jorge Stewart, NP   ondansetron hcl (ZOFRAN) 4 mg tablet Take 1 Tab by mouth every eight (8) hours as needed for Nausea, Vomiting or Nausea or Vomiting. 4/30/21   Sahara Gustafson MD   omeprazole (PRILOSEC) 20 mg capsule Take 1 Cap by mouth daily. Indications: indigestion  Patient taking differently: Take 20 mg by mouth as needed. Indications: indigestion 4/26/21   Sahara Gustafson MD   glucose blood VI test strips (OneTouch Ultra Blue Test Strip) strip Use to test blood sugar daily 6/20/20   Jaja Davey MD   lancets misc Use to test blood sugar daily 6/16/20   Sahara Gustafson MD   acetaminophen (TYLENOL EXTRA STRENGTH) 500 mg tablet Take  by mouth every six (6) hours as needed. Provider, Historical   aspirin delayed-release 81 mg tablet Take 81 mg by mouth daily. Provider, Historical       Physical Exam:    Blood pressure 132/78, pulse 88, temperature 98 °F (36.7 °C), resp. rate 20, height 6' 2\" (1.88 m), weight 85.7 kg (189 lb), SpO2 99 %. General: alert, cooperative, no distress, appears stated age  Heart: normal S1 and S2  Lungs: clear to auscultation bilaterally    Plan of Care/Planned Procedure:  Risks, benefits, and alternatives reviewed with patient and he agrees to proceed with the procedure.      Shantell Seals MD

## 2021-06-08 NOTE — PROGRESS NOTES
Pt arrives ambulatory to angio department accompanied by wife for CT guided bone marrow bx and lung bx procedure. All assessments completed and consent was reviewed. Education given was regarding procedure, conscious sedation, post-procedure care and  management/follow-up. Opportunity for questions was provided and all questions and concerns were addressed.

## 2021-06-08 NOTE — DISCHARGE INSTRUCTIONS
BONE MARROW BIOPSY/ASPIRATION DISCHARGE INSTRUCTIONS  A bone marrow aspiration is a procedure that takes out a small amount of bone marrow fluid through a needle. A bone marrow biopsy uses a needle to take out a small amount of bone with the marrow inside it. These samples are then checked under a microscope. The hip bone is the most commonly used area for these procedures. Home Care Instructions: You may resume your regular diet and medication regimen. Do not drink alcohol, drive, or make any important legal decisions in the next 24 hours. Do not lift anything heavier than a gallon of milk until the soreness goes away. You may use over the counter acetaminophen or ibuprofen for the soreness. You may apply an ice pack to the affected area for 20-30 minutes at time for the first 24 hours. After that, you may apply a heat pack. You will have a bandaid over the area where the doctor put the needle. Keep this area clean and dry for the next 24 hours. Change the bandaid daily, or if it becomes wet, until the area has healed. Call if you have any bleeding other than a small spot on your bandaid. Call if you have any signs or symptoms of infection: fever, redness, or drainage from the puncture site or fever. Should you experience any significant changes, please call [phone] between the hours of 7:30 am and 10 pm or [phone] after hours. After hours, ask the  to page the X-ray Technologist, and describe the problem to the technologist.         Patient Education        Percutaneous Lung Biopsy: What to Expect at Home  Your Recovery     A needle biopsy of the lung is a procedure to take a sample (biopsy) of lung tissue. The doctor puts a long needle through your chest wall to get the sample. Another doctor will look at the lung tissue with a microscope to check for infection, cancer, or other lung problems.   You may be sore where the doctor made the cut (incision) in your skin and put in the biopsy needle. You may feel some pain in your lung when you take a deep breath. These symptoms usually get better in a few days. If you cough up mucus, there may be streaks of blood in the mucus for the first week after the procedure. You may need to take it easy at home for a day or two after the procedure. For 1 week, try to avoid heavy lifting and strenuous activities. These activities could cause bleeding from the biopsy site. It can take several days to get the results of the biopsy. The doctor or nurse will discuss the results with you. This care sheet gives you a general idea about how long it will take for you to recover. But each person recovers at a different pace. Follow the steps below to feel better as quickly as possible. How can you care for yourself at home? Activity    · Rest when you feel tired. Getting enough sleep will help you recover.     · Try to walk each day. Start by walking a little more than you did the day before. Bit by bit, increase the amount you walk. Walking boosts blood flow and helps prevent pneumonia and constipation.     · Avoid strenuous activities, such as bicycle riding, jogging, weight lifting, or aerobic exercise, for 1 week or until your doctor says it is okay.     · For 1 week, avoid lifting anything that would make you strain. This may include a child, heavy grocery bags and milk containers, a heavy briefcase or backpack, cat litter or dog food bags, or a vacuum .     · Ask your doctor when you can drive again.     · You may need to take 1 or 2 days off from work. It depends on the type of work you do and how you feel.     · You may shower 1 or 2 days after the procedure, if your doctor says it is okay. Pat the incision dry. Do not take a bath for the first week, or until your doctor tells you it is okay.     · Do not fly in an airplane or dive deeply (such as in scuba diving) until your doctor tells you it is okay.  Avoid any situations where there is increased air pressure. Diet    · You can eat your normal diet. If your stomach is upset, try bland, low-fat foods like plain rice, broiled chicken, toast, and yogurt. Medicines    · Your doctor will tell you if and when you can restart your medicines. He or she will also give you instructions about taking any new medicines.     · If you take aspirin or some other blood thinner, ask your doctor if and when to start taking it again. Make sure that you understand exactly what your doctor wants you to do.     · Be safe with medicines. Take pain medicines exactly as directed. ? If the doctor gave you a prescription medicine for pain, take it as prescribed. ? If you are not taking a prescription pain medicine, ask your doctor if you can take an over-the-counter medicine.     · If you think your pain medicine is making you sick to your stomach:  ? Take your medicine after meals (unless your doctor has told you not to). ? Ask your doctor for a different pain medicine.     · If your doctor prescribed antibiotics, take them as directed. Do not stop taking them just because you feel better. You need to take the full course of antibiotics. Incision care    · If you have strips of tape on the incision, leave the tape on for a week or until it falls off.     · Wash the area daily with warm, soapy water and pat it dry. Don't use hydrogen peroxide or alcohol, which can slow healing. You may cover the area with a gauze bandage if it weeps or rubs against clothing. Change the bandage every day.     · Keep the area clean and dry. Follow-up care is a key part of your treatment and safety. Be sure to make and go to all appointments, and call your doctor if you are having problems. It's also a good idea to know your test results and keep a list of the medicines you take. When should you call for help? Call 911 anytime you think you may need emergency care. For example, call if:    · You passed out (lost consciousness).      · You have severe trouble breathing.     · You have sudden chest pain and shortness of breath, or you cough up blood. Call your doctor now or seek immediate medical care if:    · You are sick to your stomach or cannot keep fluids down.     · You have pain that does not get better after you take pain medicine.     · You have a fever over 100°F.     · You have signs of infection, such as:  ? Increased pain, swelling, warmth, or redness. ? Red streaks leading from the incision. ? Pus draining from the incision. ? Swollen lymph nodes in your neck, armpits, or groin. ? A fever.     · Bright red blood has soaked through the bandage over your incision.     · You cough up a lot more mucus than normal, or the mucus changes color. Watch closely for changes in your health, and be sure to contact your doctor if you have any problems. Where can you learn more? Go to http://www.gray.com/  Enter C196 in the search box to learn more about \"Percutaneous Lung Biopsy: What to Expect at Home. \"  Current as of: October 26, 2020               Content Version: 12.8  © 1697-6319 [employer]. Care instructions adapted under license by [employer] (which disclaims liability or warranty for this information). If you have questions about a medical condition or this instruction, always ask your healthcare professional. [place] [address] any warranty or liability for your use of this information. You have received sedation medications today. YOU SHOULD NOT DRIVE FOR 24 HOURS, DO NOT OPERATE HEAVY MACHINERY, DO NOT MAKE ANY LEGAL DECISIONS OR SIGN LEGAL DOCUMENTS FOR 24 HOURS. DO NOT DRINK ALCOHOL, TAKE ANY MEDICATIONS UNLESS PRESCRIBED BY YOUR DOCTOR. IF YOU ARE A CAREGIVER, SOMEONE SHOULD TAKE THAT ROLE FOR 24 HOURS.        Side effects of sedation medications and other medications used today have been reviewed  Those side effects can include but are not limited to: dizziness, drowsiness, poor balance, fatigue, sleepiness. Take precautions at home to prevent falls, such as assistance with walking or stairs if allowed and /or when needed or position changes. Allergic or adverse reactions could include nausea, itching, hives, dizziness, or anything else out of the ordinary. Should you experience any of these significant changes, please call [phone] between the hours of 7:30 am and 10 pm or [phone] after hours. After hours, ask the  to page the X-ray Technologist, and describe the problem to the technologist. If you are experiencing chest pain, shortness of breath, altered mental state, unusual bleeding or any other emergent symptom you should call 911 immediately.

## 2021-06-09 ENCOUNTER — TELEPHONE (OUTPATIENT)
Dept: CARDIOLOGY CLINIC | Age: 76
End: 2021-06-09

## 2021-06-09 NOTE — TELEPHONE ENCOUNTER
Spoke with pt - advised last seen back on 9/517 - due to return in 6 months which was 3/2018. Will have Katya call him tomorrow to schedule an appt. Rx sent to pharmacy.
1-2x/week

## 2021-06-11 NOTE — TELEPHONE ENCOUNTER
Late Entry: Spoke with patient on 6/9/21 to inquire if he had evaluation with ophthalmology or podiatry as both needed as part of LVAD work up. Patient stated he has not yet scheduled. Requested patient schedule ophthalmology and podiatry evaluation. Call then disconnected due to power loss. Patient's wife returned call 6/10/21 and confirmed will scheduled ophthalmology and podiatry. Verónica Negron RN.

## 2021-06-14 ENCOUNTER — HOSPITAL ENCOUNTER (OUTPATIENT)
Dept: INTERVENTIONAL RADIOLOGY/VASCULAR | Age: 76
Discharge: HOME | End: 2021-06-14
Attending: STUDENT IN AN ORGANIZED HEALTH CARE EDUCATION/TRAINING PROGRAM | Admitting: STUDENT IN AN ORGANIZED HEALTH CARE EDUCATION/TRAINING PROGRAM
Payer: MEDICARE

## 2021-06-14 ENCOUNTER — TELEPHONE (OUTPATIENT)
Dept: CARDIOLOGY CLINIC | Age: 76
End: 2021-06-14

## 2021-06-14 VITALS
SYSTOLIC BLOOD PRESSURE: 134 MMHG | TEMPERATURE: 98.3 F | RESPIRATION RATE: 23 BRPM | HEART RATE: 86 BPM | OXYGEN SATURATION: 99 % | DIASTOLIC BLOOD PRESSURE: 88 MMHG

## 2021-06-14 DIAGNOSIS — I50.23 ACUTE ON CHRONIC CLINICAL SYSTOLIC HEART FAILURE (HCC): ICD-10-CM

## 2021-06-14 DIAGNOSIS — I50.23 ACUTE ON CHRONIC CLINICAL SYSTOLIC HEART FAILURE (HCC): Primary | ICD-10-CM

## 2021-06-14 DIAGNOSIS — Z79.899 RECEIVING INOTROPIC MEDICATION: ICD-10-CM

## 2021-06-14 PROCEDURE — 77030002996 HC SUT SLK J&J -A

## 2021-06-14 PROCEDURE — 74011000250 HC RX REV CODE- 250: Performed by: STUDENT IN AN ORGANIZED HEALTH CARE EDUCATION/TRAINING PROGRAM

## 2021-06-14 PROCEDURE — 2709999900 HC NON-CHARGEABLE SUPPLY

## 2021-06-14 PROCEDURE — 36584 COMPL RPLCMT PICC RS&I: CPT

## 2021-06-14 PROCEDURE — C1751 CATH, INF, PER/CENT/MIDLINE: HCPCS

## 2021-06-14 PROCEDURE — C1769 GUIDE WIRE: HCPCS

## 2021-06-14 RX ORDER — METFORMIN HYDROCHLORIDE 500 MG/1
1000 TABLET ORAL 2 TIMES DAILY WITH MEALS
COMMUNITY
End: 2021-09-22 | Stop reason: SDUPTHER

## 2021-06-14 RX ORDER — LIDOCAINE HYDROCHLORIDE 10 MG/ML
20 INJECTION INFILTRATION; PERINEURAL
Status: COMPLETED | OUTPATIENT
Start: 2021-06-14 | End: 2021-06-14

## 2021-06-14 RX ADMIN — LIDOCAINE HYDROCHLORIDE 20 ML: 10 INJECTION, SOLUTION INFILTRATION; PERINEURAL at 15:08

## 2021-06-14 NOTE — TELEPHONE ENCOUNTER
Printed and reviewed Bone Marrow biopsy and Lung Nodule biopsy results with Dr. Aggie Roldan. Per Dr. Aggie Roldan (V.O.R.B.) LVAD workup is on hold. No other recommendations received at this time.

## 2021-06-14 NOTE — DISCHARGE INSTRUCTIONS
Patient Education        Peripherally Inserted Central Catheter [facility]): Care Instructions  Your Care Instructions     A peripherally inserted central catheter (PICC) is a soft, flexible tube that runs under your skin from a vein in your arm to a large vein near your heart. One end of the catheter stays outside your body. It is a type of central venous catheter, or central venous line. You may have it for weeks or months. A PICC is used to give you medicine, blood products, nutrients, or fluids. A PICC makes doing these things more comfortable for you because they are put directly into the catheter. So you will not be stuck with a needle every time. A PICC may be used to draw blood for tests only if another vein, such as in the hand or arm, can't be used. The end of the PICC sometimes has two or three openings so that you can get more than one type of fluid or medicine at a time. Your doctor may give you medicine to make you feel relaxed. You may feel a little pain when your doctor numbs your arm. Your doctor will then thread the catheter up a vein in your arm to a larger vein. You will not feel any pain. The doctor may use stitches or other devices to hold the catheter in place where it exits your arm. After the procedure, the site may be sore for a day or two. Follow-up care is a key part of your treatment and safety. Be sure to make and go to all appointments, and call your doctor if you are having problems. It's also a good idea to know your test results and keep a list of the medicines you take. How can you care for yourself at home? · Do not wear jewelry, such as necklaces, that can catch on the catheter. · If the catheter breaks, follow the instructions your doctor gave you. If you have no instructions, clamp or tie off the catheter. Then see a doctor as soon as possible. · To help prevent infection, take a shower instead of a bath. Do not go swimming with the catheter. · Try to keep the area dry. When you shower, cover the area with waterproof material, such as plastic wrap. · Never touch the open end of the catheter if the cap is off. · Never use scissors, knives, pins, or other sharp objects near the catheter or other tubing. · If your catheter has a clamp, keep it clamped when you are not using it. · Fasten or tape the catheter to your body to prevent pulling or dangling. · Avoid clothing that rubs or pulls on your catheter. · Avoid bending or crimping your catheter. · Always wash your hands before you touch your catheter. · Wear loose clothing over the catheter for the first 10 to 14 days. When getting dressed, be careful not to pull on the catheter. How to change the dressing  Since the PICC is in one of your arms, you won't be able to change the dressing on your own. You'll need someone to help you change the dressing using the same instructions that your doctor or nurse gave you. Your PICC dressing should be changed at least once a week. If the dressing gets loose, wet, or dirty, it must be changed more often to prevent infection. Your doctor may also give you instructions for when to change the dressing. Be sure you have all your supplies ready. These include medical tape, a surgical mask, sterile gloves, and your dressing kit. The names and brands of the items will vary. Your doctor or nurse may give you specific instructions for changing the dressing. Here are basic tips for how to change the dressing. 1. Wash your hands with soap and water. Do this for 15 seconds. Dry them with paper towels. 2. Put on the surgical mask. 3. Loosen and remove your old dressing. Peel it toward the PICC, not away from it. You may need to use an adhesive remover if it doesn't come off easily. 4. Look at the site carefully for redness, swelling, drainage, tenderness, or warmth. If you notice any of these, call your doctor. 5. Wash your hands again.   6. Open your dressing kit, and put on the sterile gloves. 7. Clean the site with the supplies in the dressing kit. 8. Use the dressing that your doctor gave you, and place it over the site. 9. Tape the PICC tubing to your skin. Make sure it doesn't dangle or pull. When should you call for help? Call 911 anytime you think you may need emergency care. For example, call if:    · You passed out (lost consciousness).     · You have severe trouble breathing.     · You have sudden chest pain and shortness of breath, or you cough up blood.     · You have a fast or uneven pulse. Call your doctor now or seek immediate medical care if:    · You have signs of infection, such as:  ? Increased pain, swelling, warmth, or redness. ? Red streaks leading from the area. ? Pus or blood draining from the area. ? A fever.     · You have swelling in your face, chest, neck, or arm on the side where the catheter is.     · You have signs of a blood clot, such as bulging veins near the catheter.     · Your catheter is leaking, cracked, or clogged.     · You feel resistance when you inject medicine or fluids into your catheter.     · Your catheter is out of place. This may happen after severe coughing or vomiting, or if you pull on the catheter.     · You have chest pain or shortness of breath. Watch closely for changes in your health, and be sure to contact your doctor if:    · You have any concerns about your catheter. Where can you learn more? Go to http://www.gray.com/  Enter L935 in the search box to learn more about \"Peripherally Inserted Central Catheter (PICC): Care Instructions. \"  Current as of: February 26, 2020               Content Version: 12.8  © 2576-3497 [employer]. Care instructions adapted under license by [employer] (which disclaims liability or warranty for this information).  If you have questions about a medical condition or this instruction, always ask your healthcare professional. [employer] disclaims any warranty or liability for your use of this information.

## 2021-06-14 NOTE — PROCEDURES
Interventional Radiology  Procedure Note        6/14/2021 3:56 PM    Patient: Javier Saenz Sr     Informed consent obtained    Diagnosis: CHF    Procedure(s): RUE PICC exchange    Specimens removed:  nonde    Complications: None    Primary Physician: Dena Blakely MD    Recommendations: OK to use immediately    Discharge Disposition: Stable; discharge to home    Full dictated report to follow    Dena Blakely MD  Interventional Radiology  [facility], [facility].  3:56 PM, 6/14/2021

## 2021-06-14 NOTE — PROGRESS NOTES
Dr Sandie Guardado replaced patient's right upper extremity double lumen PICC (Power PICC 5 fr). Resumed milrinone to red port at same rate. Discharge instructions reviewed and copy given. Transported via wheelchair to discharge area for wife to drive home.

## 2021-06-14 NOTE — H&P
Radiology History and Physical    Patient: Minh Pryor  77 y.o. male       Chief Complaint: PICC retracted    History of Present Illness: 77year old male with a PICC used for milrinone infusion. The PICC was inadvertently retracted by a home health nurse. Presents for revision. History:    Past Medical History:   Diagnosis Date    CAD (coronary artery disease) '90 '97, '13 x 2    MI, [facility] ice in 2013 at [facility]    Calculus of kidney     Colonic polyps     Congestive heart failure, unspecified     Diabetes ([address]     Gastritis     Hypercholesterolemia     Sleep apnea      Family History   Problem Relation Age of Onset    Heart Disease Mother         MI    Heart Disease Father         CAD & PVD    Lung Disease Father     Cancer Father         lung    Diabetes Maternal Grandmother     Heart Disease Other         CAD    Stroke Sister      Social History     Socioeconomic History    Marital status:      Spouse name: Not on file    Number of children: Not on file    Years of education: Not on file    Highest education level: Not on file   Occupational History    Not on file   Tobacco Use    Smoking status: Never Smoker    Smokeless tobacco: Never Used   Substance and Sexual Activity    Alcohol use: Yes     Alcohol/week: 0.0 standard drinks     Comment:  VERY RARE    Drug use: No    Sexual activity: Not on file   Other Topics Concern    Not on file   Social History Narrative    Not on file     Social Determinants of Health     Financial Resource Strain:     Difficulty of Paying Living Expenses:    Food Insecurity:     Worried About Running Out of Food in the Last Year:     [address] in the Last Year:    Transportation Needs:     Lack of Transportation (Medical):      Lack of Transportation (Non-Medical):    Physical Activity:     Days of Exercise per Week:     Minutes of Exercise per Session:    Stress:     Feeling of Stress :    Social Connections:     Frequency of Communication with Friends and Family:     Frequency of Social Gatherings with Friends and Family:     Attends Jewish Services:     Active Member of Clubs or Organizations:     Attends Club or Organization Meetings:     Marital Status:    Intimate Partner Violence:     Fear of Current or Ex-Partner:     Emotionally Abused:     Physically Abused:     Sexually Abused: Allergies: Allergies   Allergen Reactions    Pcn [Penicillins] Hives       Current Medications:  No current facility-administered medications for this encounter. Physical Exam:  Blood pressure 134/88, pulse 86, temperature 98.3 °F (36.8 °C), resp. rate 23, SpO2 99 %. GENERAL: alert, cooperative, no distress, appears stated age,   LUNG: Nonlabored respiration on room air  HEART: regular rate and rhythm    Alerts:    Hospital Problems  Date Reviewed: 6/2/2021    None          Laboratory:    No results for input(s): HGB, HCT, WBC, PLT, INR, BUN, CREA, K, CRCLT, HGBEXT, HCTEXT, PLTEXT, INREXT in the last 72 hours. No lab exists for component: PTT, PT      Plan of Care/Planned Procedure:  Risks, benefits, and alternatives reviewed with patient and he agrees to proceed with the procedure.      PICC exchange    Rox Schroeder MD  Interventional Radiology  [facility].  3:55 PM, 6/14/2021

## 2021-06-14 NOTE — TELEPHONE ENCOUNTER
Received a call this am from patient's wife, stating patient's PICC line was \"beeping\" all weekend and patient needs chest xray per home health nurse. I called [facility] twice and left voicemails before receiving a call back from home health nurse, who stated that PICC line pump was beeping, PICC line was meeting resistance to flushing in both ports, also PICC came out \"some\"-he did not measure but he pushed it back in at least 1 cm. He then recommended to wife that she have patient get CXR today. I notified Jonathan Ambrosio, ordered PICC replacement,scheduled for 2:00 today and notified wife. She will have patient here for procedure. I then called [facility] Nursing Supervisor \"Z\" [phone] and notified her of incident. She will speak with [facility] nurse.

## 2021-06-15 ENCOUNTER — TELEPHONE (OUTPATIENT)
Dept: CARDIOLOGY CLINIC | Age: 76
End: 2021-06-15

## 2021-06-15 ENCOUNTER — TRANSCRIBE ORDER (OUTPATIENT)
Dept: SCHEDULING | Age: 76
End: 2021-06-15

## 2021-06-15 DIAGNOSIS — C34.92 ADENOCARCINOMA OF LUNG, LEFT (HCC): Primary | ICD-10-CM

## 2021-06-15 NOTE — TELEPHONE ENCOUNTER
Telephone Call RE:  Appointment reminder     Outcome:     [x] Patient confirmed appointment   [] Patient rescheduled appointment for    [] Unable to reach   [] Left message              [] Other:       Confirmed appt with patient, screened for Covid. Reminder to arrive 15 minutes early for check-in and screening.   Amber June

## 2021-06-16 ENCOUNTER — OFFICE VISIT (OUTPATIENT)
Dept: CARDIOLOGY CLINIC | Age: 76
End: 2021-06-16
Payer: MEDICARE

## 2021-06-16 ENCOUNTER — TELEPHONE (OUTPATIENT)
Dept: CARDIOLOGY CLINIC | Age: 76
End: 2021-06-16

## 2021-06-16 ENCOUNTER — DOCUMENTATION ONLY (OUTPATIENT)
Dept: CARDIOLOGY CLINIC | Age: 76
End: 2021-06-16

## 2021-06-16 VITALS
RESPIRATION RATE: 16 BRPM | DIASTOLIC BLOOD PRESSURE: 80 MMHG | BODY MASS INDEX: 24.64 KG/M2 | HEART RATE: 77 BPM | WEIGHT: 192 LBS | TEMPERATURE: 96.7 F | OXYGEN SATURATION: 99 % | SYSTOLIC BLOOD PRESSURE: 124 MMHG | HEIGHT: 74 IN

## 2021-06-16 DIAGNOSIS — I50.22 SYSTOLIC CHF, CHRONIC (HCC): Primary | ICD-10-CM

## 2021-06-16 PROCEDURE — G8427 DOCREV CUR MEDS BY ELIG CLIN: HCPCS | Performed by: INTERNAL MEDICINE

## 2021-06-16 PROCEDURE — 1101F PT FALLS ASSESS-DOCD LE1/YR: CPT | Performed by: INTERNAL MEDICINE

## 2021-06-16 PROCEDURE — 1111F DSCHRG MED/CURRENT MED MERGE: CPT | Performed by: INTERNAL MEDICINE

## 2021-06-16 PROCEDURE — G8432 DEP SCR NOT DOC, RNG: HCPCS | Performed by: INTERNAL MEDICINE

## 2021-06-16 PROCEDURE — 99215 OFFICE O/P EST HI 40 MIN: CPT | Performed by: INTERNAL MEDICINE

## 2021-06-16 PROCEDURE — G8420 CALC BMI NORM PARAMETERS: HCPCS | Performed by: INTERNAL MEDICINE

## 2021-06-16 PROCEDURE — G8536 NO DOC ELDER MAL SCRN: HCPCS | Performed by: INTERNAL MEDICINE

## 2021-06-16 PROCEDURE — 3017F COLORECTAL CA SCREEN DOC REV: CPT | Performed by: INTERNAL MEDICINE

## 2021-06-16 NOTE — TELEPHONE ENCOUNTER
Spoke with Nixon Almaraz at [facility] who stated patient would need to be off the dextrose in his Milrinone for 24-48 hours before his Pet Scan. Mona Correa NP made aware.

## 2021-06-16 NOTE — PATIENT INSTRUCTIONS
Medication changes:    No medication changes     Please take this to your pharmacy to notify them of the change in medications. Testing Ordered:    None     Other Recommendations:      Ensure your drinking an adequate amount of water with a goal of 6-8 eight ounce glasses (1.5-2 liters) of fluid daily. Your urine should be clear and light yellow straw colored. If your blood pressure begins to consistently run below 90/60 and/or you begin to experience dizziness or lightheadedness, please contact the [facility] at [phone]. Follow up 4 weeks with [facility]      Please monitor your weights daily upon waking and after using the bathroom. Keep a written records of your weights and bring to your next appointment. If you have a weight gain of 3 or more pounds overnight OR 5 or more pounds in one week please contact our office. Thank you for allowing us the privilege of being a part of your healthcare team! Please do not hesitate to contact our office at [phone] with any questions or concerns. Virtual Heart Failure [address] invites you to learn more about heart failure and to share your questions, ideas, and experiences with others. Each month, the Heart Failure Support Group features a new educational topic and a guest speaker, followed by an interactive discussion. Our Heart Failure Nurse Navigator will moderate each session. You will be able to participate by phone, tablet or computer through [address] [place] [address]. This support group takes place on the 3rd Thursday of each month from 6:00-7:30PM. All individuals living with heart failure and their caregivers are welcome to join. If you are interested in participating, please contact us at [email or web address] and you will be sent the link to join the [employer].

## 2021-06-16 NOTE — PROGRESS NOTES
[facility] in [place], [address] [facility] Note    Patient name: Dominick Neely  Patient : 1945  Patient MRN: [id number]  Date of service: 21    Primary care physician: Lashae Mae MD  Primary general cardiologist:  Dr. Edu Purcell    Primary [facility] cardiologist: Jack Gonzales MD     PLAN OF CARE:  · Severe ischemic cardiomyopathy, LVEF 20-25%; stage D, NYHA class IV symptoms; patient unerwent LVAD-DT evaluation; lung nodule biopsy was positive for adenocarcinoma, patient is now undergoing staging of cancer by oncology; the remainder of LVAD workup will be placed on hold including EGD/C-scope, until there is prognosis of cancer and plan available from oncology team  · I discussed with the patient and wife that if any other life-limiting condition is identified that would affect his 2-years survival this would be considered contraindication to LVAD implantation  · Meanwhile, we continue chronic palliative infusion of milrinone, and working on optimizing nutritional status and muscle conditioning     PLAN:  Continue current medical therapy for heart failure  Continue milrinone 0.3mcg/kg/min dosed to 88.6kg  Discontinued coreg due to RV dysfunction and in ancitipation of surgery  Discontinued ACEi/ARB/ARNi in anticipation of cardiac surgery  Consider resuming entresto if not candidate for LVAD per oncology  Continue current dose of eplerenone 50mg daily  Patient is not taking SGLT2 inhibitor; HgbA1C 7; consider starting OP  Continue bumex to 1mg daily  Not on allopurinol or uloric, uric acid level wnl  Continue baby ASA   Consider resuming effient if considered not candidate for LVAD (washout prior to surgery)  Continue current dose of statin, check lipid profile, CPK and LFT  Continue ranolazine (for HR and angina control) and imdur 30mg daily  Check ICD interrogation; records from EP cardiology at [facility] scanned in [address] [place] [address] CPAP therapy  Schedule cardiac rehabiliation  Routine HF labs monthly  May need sleep study repeated; patient will check  Advanced care plan forms on file  Referral to nutritionist for supplements with diabetes  Genetic testing pending  Need records from hematology re: indication for DVT filter? Return to [facility] Clinic in 4 weeks with NP/MD      IMPRESSION:  Fatigue at rest  Shortness of breath with minimal exertion  Volume overload  Acute on chronic systolic heart failure  · Stage D, NYHA class IV symptoms improved to class II on inotropes  · Non-ischemic cardiomyopathy, LVEF 20% with LVEDD 6.2  · RV dysfunction, TAPSE 1.22 (prelimnary read)  · TBili 1.5 likely from cardiac congestion  At risk of sudden cardiac death  · Recent cardiac arrest   · S/p ICD (1/2013, [employer] followed by [employer])  Coronary artery disease  · S/p multiple interventions  · S/p 4V CABG (8/2012)  · LHC (7/2019) severe stenosis of LIMA to LAD anastomosis site, SVG graft to OM is occluded, SVG graft to RCA 40-50%, LAD occluded proximally, severe proximal LCx, RCA is the long . · PET (6/2019) EF 24% with anterior lateral and inferior reversible defect  Cardiac risk factors  · HTN  · HL  · DM2  · SRUTHI on CPAP  · MIld carotid stenosis  Anemia, microcytic  · Iron deficiency  DVT with filter  Pulmonary nodules   Dysphagia      CARDIAC IMAGING:  Echo (5/20/21)  · LV: Estimated LVEF is 20 - 25%. Normal wall thickness. Mildly dilated left ventricle. Severely and globally reduced systolic function. Severe (grade 4) left ventricular diastolic dysfunction. · LA: Severely dilated left atrium. · RA: Severely dilated right atrium. · MV: Moderate mitral valve regurgitation is present. · TV: Moderate tricuspid valve regurgitation is present. · AO: Mild aortic root dilatation. · RV: Pacer/ICD present. · LVED 6.2cm  EKG (5/20/21) SB with 1degree AV block, QRS 116ms     [facility] 5/24/21 Native Coronaries: LM - moderate to severe distal disease 50%.   LAD 100% prox occluded (), heavily calcified, LCx: 80% proximal stenosis. OM1 is  (seen filling faintly via collaterals). OM2 99% stenosis  RCA: 100% proximal occluded.   LIMA to LAD: patent, supplies only a diagonal branch (probably D2). The LAD distal to the bifurcation is 100% occluded () and fills via right to left collaterals off the SVG to   RCA.   SVG to R-PDA: patent with moderate diffuse irregularities but no obstructive disease in the graft.    No appealing interventional targets.      NST as above     ICD interrogation (5/12/21) [address] single lead, no events, normal device function and good battery     HEMODYNAMICS:  RHC 5/24/21 CI 1.97, PA 33/9/17, RA 1, PCWP 6- off milrinone   CPEST not done  6MW ordered     OTHER IMAGING:  CXR (6/17/21) clear  CT 5/21/21 1. Irregular pulmonary nodule in the left upper lobe measuring 2 cm, suspicious for primary pulmonary malignancy. 2. Additional 6 mm left upper lobe pulmonary nodule. 3. Severe calcific atherosclerosis of the coronary arteries and abdominal aorta. No aneurysm. 4. Cardiomegaly. 5. No acute abnormality within the chest, abdomen, or pelvis.     HISTORY OF PRESENT ILLNESS:  I had the pleasure of seeing Heron Oconnell Sr in [facility] at [address] [place] [address] in [address] [place] [address] a 77 y. o. male with h/o HTN, HL, DM2, SRUTHI on CPAP, chronic systolic heart failure, stage D, NYHA class IV symptoms, non-ischemic cardiomyopathy, LVEF 20% with LVEDD 6.2, RV dysfunciton, TAPSE 1.22, TBili 1.5 likely from cardiac congestion, recent cardiac arrest s/p ICD (1/2013, [address] followed by [facility]), coronary artery disease s/p multiple interventions s/p 4V CABG (8/2012), LHC (7/2019) severe stenosis of LIMA to LAD anastomosis site, SVG graft to OM is occluded, SVG graft to RCA 40-50%, LAD occluded proximally, severe proximal LCx, RCA is the long . PET (6/2019) EF 24% with anterior lateral and inferior reversible defect. Anemia, microcytic with iron deficiency, DVT with filter.     Patient was referred to AHF Clinic by Dr. Jose Mcpherson for evaluation of his candidacy for advanced therapies.     Patient agreed to inpatient initiation of palliative infusion of chronic inotropes and evaluation for LVAD-DT. Patient was admitted 5/2-5/25/21. Patient was started on milrinone infusion and had first part of LVAD evaluation completed. Right and left heart cath on 5/24/21 that showed severe diffuse native vessel CAD, with patent grafts (LIMA to D2, SVG to RCA). Antiplatelet therapy was held during hospitalization and after discharge due to anticipated pulmonary nodule biopsy, bone marrow biopsy and EGD/C-Scope as part of LVAD workup.     Patient was readmitted 5/26-5/28/21 with hypertension, headache and chest pain, his troponin peaked at 10; he was shortly bridged with heparin, otherwise had normal EKG and chest CT negative for PE. Cardiology and AHF service was consulted and patient was discharged home after troponin came down.      INTERVAL HISTORY:  Today, patient presents for routine clinic visit.     Patient is doing well though feeling tired.     Patient walked to our clinic from parking garage without having to slow down or stop. Patient can walk more than one block without symptoms of fatigue or shortness of breath or chest pain. Patient can walk one flight of stairs without symptoms of fatigue or shortness of breath or chest pain. Patient can perform home activities without problem and routinely participates in daily walking for more than 15 minutes.      Patient denies symptoms of volume overload or leg edema. Patient denies abdominal bloating or change of appetite. Patient's weight remained stable.      Patient denies orthopnea, PND or nocturia.     Patient denies irregular heart rate or palpitations. No presyncope or syncope.  ICD has not fired.     Patient denies other cardiac symptoms such as chest pain or leg pain with walking.      Patient is compliant with fluid restriction and taking medications as prescribed. Patient manages his own medications. REVIEW OF SYSTEMS:  General: Denies fever, night sweats. Ear, nose and throat: Denies difficulty hearing, sinus problems, runny nose, post-nasal drip, ringing in ears, mouth sores, loose teeth, ear pain, nosebleeds, sore throate, facial pain or numbess  Cardiovascular: see above in the interval history  Respiratory: Denies cough, wheezing, sputum production, hemoptysis. Gastrointestinal: Denies heartburn, constipation, diarrhea, abdominal pain, nausea, vomiting, difficulty swallowing, blood in stool  Kidney and bladder: Denies painful urination, frequent urination, urgency  Musculoskeletal: Denies joint pain, muscle weakness  Skin and hair: Denies change in existing skin lesions, hair loss or increase, breast changes    PHYSICAL EXAM:  Visit Vitals  /80 (BP 1 Location: Left upper arm, BP Patient Position: Sitting)   Pulse 77   Temp (!) 96.7 °F (35.9 °C) (Oral)   Resp 16   Ht 6' 2\" (1.88 m)   Wt 192 lb (87.1 kg)   SpO2 99%   BMI 24.65 kg/m²     General: Patient is well developed, well-nourished in no acute distress  HEENT: Normocephalic and atraumatic. No scleral icterus. Pupils are equal, round and reactive to light and accomodation. No conjunctival injection. Oropharynx is clear. Neck: Supple. No evidence of thyroid enlargements or lymphadenopathy. JVD: Cannot be appreciated   Lungs: Breath sounds are equal and clear bilaterally. No wheezes, rhonchi, or rales. Heart: Regular rate and rhythm with normal S1 and S2. No murmurs, gallops or rubs. Abdomen: Soft, no mass or tenderness. No organomegaly or hernia. Bowel sounds present. Genitourinary and rectal: deferred  Extremities: No cyanosis, clubbing, or edema. Neurologic: No focal sensory or motor deficits are noted. Grossly intact. Psychiatric: Awake, alert an doriented x 3.  Appropriate mood and affect. Skin: Warm, dry and well perfused. No lesions, nodules or rashes are noted. PAST MEDICAL HISTORY:  Past Medical History:   Diagnosis Date    CAD (coronary artery disease) '90 '97, '13 x 2    MI, [facility] ice in 2013 at [facility]    Calculus of kidney     Colonic polyps     Congestive heart failure, unspecified     Diabetes ([address]     Gastritis     Hypercholesterolemia     Sleep apnea        PAST SURGICAL HISTORY:  Past Surgical History:   Procedure Laterality Date    COLONOSCOPY  04/06/2011    16, due 21    ENDOSCOPY, COLON, DIAGNOSTIC      11, due 16    HX CORONARY ARTERY BYPASS GRAFT  8/22/12    x 4 vessel by MISSY Ramirez    HX HEENT      LASIK    HX PACEMAKER PLACEMENT  1/30/13    ND CARDIAC SURG PROCEDURE UNLIST  2012    x 4 vessels       FAMILY HISTORY:  Family History   Problem Relation Age of Onset    Heart Disease Mother         MI    Heart Disease Father         CAD & PVD    Lung Disease Father     Cancer Father         lung    Diabetes Maternal Grandmother     Heart Disease Other         CAD    Stroke Sister        SOCIAL HISTORY:  Social History     Socioeconomic History    Marital status:      Spouse name: Not on file    Number of children: Not on file    Years of education: Not on file    Highest education level: Not on file   Tobacco Use    Smoking status: Never Smoker    Smokeless tobacco: Never Used   Substance and Sexual Activity    Alcohol use: Yes     Alcohol/week: 0.0 standard drinks     Comment:  VERY RARE    Drug use: No     Social Determinants of Health     Financial Resource Strain:     Difficulty of Paying Living Expenses:    Food Insecurity:     Worried About Running Out of Food in the Last Year:     [address] in the Last Year:    Transportation Needs:     Lack of Transportation (Medical):      Lack of Transportation (Non-Medical):    Physical Activity:     Days of Exercise per Week:     Minutes of Exercise per Session:    Stress:     Feeling of Stress :    Social Connections:     Frequency of Communication with Friends and Family:     Frequency of Social Gatherings with Friends and Family:     Attends Bahai Services:     Active Member of Clubs or Organizations:     Attends Club or Organization Meetings:     Marital Status:        LABORATORY RESULTS:  Labs Latest Ref Rng & Units 6/8/2021 6/1/2021 5/28/2021 5/26/2021 5/25/2021 5/24/2021 5/23/2021   WBC 4.1 - 11.1 K/uL 3.8(L) 4.5 3.4(L) 4.4 4. 0(L) 3. 6(L) -   RBC 4.10 - 5.70 M/uL 5.45 5.76(H) 5.49 5.72(H) 5.52 5.78(H) -   Hemoglobin 12.1 - 17.0 g/dL 13.0 13.6 12.9 13.6 12.8 13.4 -   Hematocrit 36.6 - 50.3 % 41.5 43.5 41.8 43.2 41.4 43.7 -   MCV 80.0 - 99.0 FL 76. 1(L) 75. 5(L) 76. 1(L) 75. 5(L) 75. 0(L) 75. 6(L) -   Platelets 905 - 242 K/uL 195 215 165 160 158 152 -   Lymphocytes 12 - 49 % 19 - 35 19 - - -   Monocytes 5 - 13 % 13 - 16(H) 11 - - -   Eosinophils 0 - 7 % 1 - 3 1 - - -   Basophils 0 - 1 % 1 - 1 1 - - -   Albumin 3.5 - 5.0 g/dL - - 3. 3(L) 3.6 3. 2(L) 3.4(L) -   Calcium 8.5 - 10.1 MG/DL - - 9.2 9.6 8.9 9.6 9.1   Glucose 65 - 100 mg/dL - - 217(H) 281(H) 177(H) 125(H) 187(H)   BUN 6 - 20 MG/DL - - 14 12 13 16 14   Creatinine 0.70 - 1.30 MG/DL - - 0.90 1.00 0.97 1.10 1.13   Sodium 136 - 145 mmol/L - - 133(L) 132(L) 134(L) 135(L) 135(L)   Potassium 3.5 - 5.1 mmol/L - - 4.2 4.4 4.7 4.1 4.1   TSH 0.36 - 3.74 uIU/mL - - - 1.47 - - -   PSA 0.01 - 4.0 ng/mL - - - - - - -   LDH 85 - 241 U/L - - - - - - -   Some recent data might be hidden       ALLERGY:  Allergies   Allergen Reactions    Pcn [Penicillins] Hives        CURRENT MEDICATIONS:    Current Outpatient Medications:     metFORMIN (GLUCOPHAGE) 500 mg tablet, Take 500 mg by mouth two (2) times daily (with meals). , Disp: , Rfl:     benzonatate (TESSALON) 200 mg capsule, Take 1 Capsule by mouth three (3) times daily as needed for Cough. , Disp: 30 Capsule, Rfl: 2    ranolazine ER (RANEXA) 500 mg SR tablet, Take 1 Tablet by mouth two (2) times a day for 30 days. , Disp: 60 Tablet, Rfl: 0    eplerenone (INSPRA) 50 mg tab tablet, Take 1 Tablet by mouth daily. , Disp: 90 Tablet, Rfl: 1    isosorbide mononitrate ER (IMDUR) 30 mg tablet, Take 1 Tablet by mouth every twelve (12) hours. , Disp: 180 Tablet, Rfl: 1    ergocalciferol (ERGOCALCIFEROL) 1,250 mcg (50,000 unit) capsule, Take 1 Capsule by mouth every seven (7) days for 11 doses. , Disp: 11 Capsule, Rfl: 0    hydrALAZINE (APRESOLINE) 50 mg tablet, Take 1 Tablet by mouth three (3) times daily. , Disp: 180 Tablet, Rfl: 2    magnesium oxide (MAG-OX) 400 mg tablet, Take 1 Tablet by mouth daily. , Disp: 90 Tablet, Rfl: 1    milrinone (PRIMACOR) 20 mg/100 mL (200 mcg/mL) infusion, 26.58 mcg/min by IntraVENous route continuous. (Patient taking differently: 0.3 mcg/kg/min by IntraVENous route continuous. Based on weight of 88.6 kg), Disp: 100 mL, Rfl: 0    potassium chloride SR (K-TAB) 20 mEq tablet, Take 2 Tablets by mouth two (2) times a day., Disp: 240 Tablet, Rfl: 1    rosuvastatin (CRESTOR) 10 mg tablet, TAKE 1 TABLET NIGHTLY, Disp: 90 Tab, Rfl: 3    omeprazole (PRILOSEC) 20 mg capsule, Take 1 Cap by mouth daily. Indications: indigestion (Patient taking differently: Take 20 mg by mouth as needed. Indications: indigestion), Disp: 30 Cap, Rfl: 1    tamsulosin (FLOMAX) 0.4 mg capsule, TAKE 1 CAPSULE DAILY, Disp: 90 Cap, Rfl: 3    glipiZIDE SR (GLUCOTROL XL) 10 mg CR tablet, TAKE 1 TABLET DAILY (DUE FOR OFFICE VISIT PLEASE CALL OFFICE TO SCHEDULE), Disp: 30 Tab, Rfl: 0    glucose blood VI test strips (OneTouch Ultra Blue Test Strip) strip, Use to test blood sugar daily, Disp: 100 Strip, Rfl: 11    lancets misc, Use to test blood sugar daily, Disp: 100 Each, Rfl: 11    bumetanide (BUMEX) 2 mg tablet, Take  by mouth. 1/2 tab every other day., Disp: , Rfl: 0    acetaminophen (TYLENOL EXTRA STRENGTH) 500 mg tablet, Take  by mouth every six (6) hours as needed. , Disp: , Rfl:     aspirin delayed-release 81 mg tablet, Take 81 mg by mouth daily. , Disp: , Rfl:     ondansetron hcl (ZOFRAN) 4 mg tablet, Take 1 Tab by mouth every eight (8) hours as needed for Nausea, Vomiting or Nausea or Vomiting. (Patient not taking: Reported on 6/14/2021), Disp: 20 Tab, Rfl: 1    Thank you for your referral and allowing me to participate in this patient's care.     Jolie Haney MD PhD  [address] [place] [address], [address]  Phone: [phone]  Fax: [phone]    PATIENT CARE TEAM:  Patient Care Team:  Isreal Conteh MD as PCP - Flower Beltre MD as PCP - [facility]  Jocelin Nash MD as Physician (Cardiology)  Serene Barajas MD as Physician (Cardiology)  Danie Castaneda MD as Physician (Gastroenterology)  Jerad Velasco MD as Physician (Orthopedic Surgery)  Manjeet Maxwell MD as Physician (Ophthalmology)  Edd Robson Wilms, MD as Physician ([address] [place] [address])  Darlyn Garcia MD (Cardiology)  Charlette Sanchez MD (Cardiology)  Joseph Llamas RN as Care Transitions Nurse     Total visit time: 40 minutes (> 50% spent face-to-face counseling)

## 2021-06-16 NOTE — TELEPHONE ENCOUNTER
Call to patient, informed of information below.    Called [facility] and spoke with Pharmacist Juan. Inquired if patient's milrinone could be switched from D5W to NS 48 hours before Pet Scan. Juan stated he would check and call back. Spoke with Juan from [place] [address] who states they are able to switch patient's milrinone to normal saline for 48 hours before his Pet Scan. Will notify provider.

## 2021-06-16 NOTE — PROGRESS NOTES
MECHANICAL CIRCULATORY SUPPORT & TRANSPLANT EVALUATION  Advanced Heart Failure Ethan Wolfe    1945            77 y.o.   male   Ethnicity:   Marital status:   [address] [address]     Phone: [phone]   MRN: [id number]          INTERMACS: 3  Time of GDMT: Inotrope Dependent  Height:     Ht Readings from Last 1 Encounters:   05/21/21 6' 1\" (1.854 m)      Weight:       Wt Readings from Last 1 Encounters:   05/24/21 193 lb 2 oz (87.6 kg)      BMI: 25.48kg/m2  Blood type: B+  Referring MD: Amador Rush MD  Date Consented to Eval: 5/21/21  Original Date of Presentation: 5/28/21  Date of Re-Presentation: 6/18/21      Cardiac history:  Fatigue at rest  Shortness of breath with minimal exertion  Volume overload  Acute on chronic systolic heart failure  · Stage D, NYHA class IV symptoms  · Ischemic cardiomyopathy, LVEF 20% with LVEDD 6.2  · RV dysfunction, TAPSE 1.22 (prelimnary read)  · TBili 1.5 likely from cardiac congestion  At risk of sudden cardiac death  · Recent cardiac arrest   · S/p ICD (1/2013, [place] [facility] followed by [facility])  Coronary artery disease  · S/p multiple interventions  · S/p 4V CABG (8/2012)  · LHC (7/2019) severe stenosis of LIMA to LAD anastomosis site, SVG graft to OM is occluded, SVG graft to RCA 40-50%, LAD occluded proximally, severe proximal LCx, RCA is the long .    · PET (6/2019) EF 24% with anterior lateral and inferior reversible defect  Cardiac risk factors  · HTN  · HL  · DM2  · SRUTHI on CPAP  · MIld carotid stenosis  Devices: ICD (1/2013, [place] [facility] followed by [facility])  [address] [facility] 2012 Other medical history:   Anemia, microcytic  · Iron deficiency  DVT with filter  Pulmonary nodules      Other surgical history:  As above      Medications:  Bumex 0.5mg  Hydralazine 50mg  Milrinone 0.3 mcg/kg/min  Inspra 50mg  Imdur 30mg  Asa 81mg  Effient 10mg  Crestor 10mg  Magnesium 400mg  Potassium 40 mEq Allergies: Allergies   Allergen Reactions    Pcn [Penicillins] Hives             Cardiovascular imaging:  Echo 21  ·  LV: Estimated LVEF is 20 - 25%. Normal wall thickness. Mildly dilated left ventricle. Severely and globally reduced systolic function. Severe (grade 4) left ventricular diastolic dysfunction. · LA: Severely dilated left atrium. · RA: Severely dilated right atrium. · MV: Moderate mitral valve regurgitation is present. · TV: Moderate tricuspid valve regurgitation is present. · AO: Mild aortic root dilatation. RV: Pacer/ICD present. LVIDd 6.2 cm Cardiac MRI: not done  PYP test: 21   IMPRESSION: Nuclear Cardiac Amyloid SPECT:  Equivocal for myocardial TTR amyloidosis            LHC: 21  Native Coronaries:  LM - moderate to severe distal disease 50%. % prox occluded (), heavily calcified  LCx: 80% proximal stenosis. OM1 is  (seen filling faintly via collaterals). OM2 99% stenosis  RCA: 100% proximal occluded.     LIMA to LAD: patent, supplies only a diagonal branch (probably D2).  The LAD distal to the bifurcation is 100% occluded () and fills via right to left collaterals off the SVG to RCA.     SVG to R-PDA: patent with moderate diffuse irregularities but no obstructive disease in the graft.      NST: not done     Viability: not done EK21  Sinus bradycardia with sinus arrhythmia with 1st degree AV block   Anteroseptal infarct , age undetermined   When compared with ECG of 20-MAY-2021 17:18,   No significant change       RHC:21  PA 33/9/17   RA mean 1  PCWP mean 6  [place] CI 1.97  SVR 1748.56 dcs-5  .36 dsc-5     CPEST: not done     6MW:  Date: 21   Pre/Post HR: 74/81   Pre/Post SpO2: 99/99   Distance (meters): 198.12    Abdominal ultrasound: 3/12/21  Cholelithiasis.   Increased heterogenous hepatic ela most likely related to hepatic parenchymal  change.     Ankle-Brachial Index: 21  · No evidence of hemodynamically significant arterial obstruction in the bilateral lower extremities   Carotid doppler: 5/22/21  · Consistent with less than 50% stenosis (low end) of the right internal carotid and minimal stenosis of the left internal carotid. · Vertebrals are patent with antegrade flow.      Non-cardiac imaging:  CT chest/abd/pelvis: 5/22/21  1. Irregular pulmonary nodule in the left upper lobe measuring 2 cm, suspicious  for primary pulmonary malignancy. 2. Additional 6 mm left upper lobe pulmonary nodule. 3. Severe calcific atherosclerosis of the coronary arteries and abdominal aorta. No aneurysm. 4. Cardiomegaly. 5. No acute abnormality within the chest, abdomen, or pelvis.     Head CT: not applicable    CXR: 4/71/55  Cardiomegaly.  No acute cardiopulmonary process   PFT updated: 5/28/21    Predicted Measured %   FVC  4.69  3.13  67<   FEV1  3.05  2.62  86   FEV1/FVC  65.02  83.74  129>   DLCO  24.90  24.20 97     EGD: not done      Colonoscopy: not done     Mammogram: not applicable     GYN/Pap smear: not applicable     Dexa scan: not done      Labs:  CBC: (updated 6/8/21)  -WBC 3.8  -HGB 13.0  -  CMP:(updated 5/28/21)  -Na 134  -K 4.2  -Glu 217  -BUN 14  -Cr 0.90  -Ca 9.2  -TBili 1.1  -ALT 40  -  -Alk phos 227  -Albumin 3.3  ProBNP (updated 5/28/21) 3531  ABG 7.53/32.7/108/27.4  REBECCA (5/21/21) negative  Blood culture (5/21/21) NGTD  CK (updated 5/26/21) 37  Cortisol (5/21/21)16.0  CRP(5/21/21) 2.6  Digoxin level n/a  ESR(5/21/21) 12  Fecal Occult (5/25/21)negative  Ferritin(5/20/21)  27  G6PD(5/21/21) 255  H.pylori Ab (5/21/21) negative  Heparin Ab(5/23/21) 0.094   Hep C Ab(5/20/21) negative  HgbA1C (5/20/21)  7.0  HIV 1,2 Ab (5/22/21) nonreactive  INR (updated) 1.1  Iron sat (5/20/21)  8  Lactic acid (5/21/21) 1.3  LDH (5/21/21) 168  Lipid profile(5/20/21)  112  Lupus anticoagulant(5/21/21) negative Magnesium (updated 5/28/21) 2.3  MRSA Swab(5/21/21) negative  Plasma Free Hgb (5/24/21) 1.2  Prealbumin (5/21/21) 22.3  Procalcitonin(5/21/21) 0.05  Protein C(5/21/21) 83  Protein S(5/21/21) 98  Prothrombin gene mutation (5/21/21) negative  PSA (5/20/21) 2.2  PTH (5/21/21) 100.4  PTT (Updated 6/1/21) 25.0  RPR (0/27/68) [id number]  LEANDRA not applicable  Troponin I (updated 5/28/21)   TSH (updated 5/27/21) 1.47  T3 (5/21/21) 2.5  T4 (5/20/21) 1.3  Uric acid (5/20/21) 5.5  Vit D level (5/21/21) 29.1  Gammopathy Profile:(5/20/21)  -IgG level 1315  -M-spike 0.4  -Free Kappa 29.1   -Free lambda 63.3  -Kappa/lambda 0.46      Urine Studies:  Cotinine (5/21/21) negative   Ethyl glucuronide not done  Microalbumin (5/22/21) 3.11  Pregnancy Test not applicable  UA (2/98/48) wnl  UDS(5/21/21) negative  Urine Cx n/a     Transplant Labs: not applicable  CMV IgG   EBV IgG   Hep B core Ab - total   Hep B surface Ag/Ab   Hepatitis A Ab-IgG  HSV 1/HSV2 IgG   HTLV1, 2 IgG   Mumps IgG   Quantiferon Gold   Rubella IgG   Rubeola IgG   Toxo IgG   Varicella IgG PRA Class I and II   HLA type                Immunizations:  COVID completed   Hep A   Hep B  Influenza 2020  Pneumonia completed 2015   Td 2019  Zoster 2010        Consultations:  Specialty Date: Results:   Advanced Heart Failure 5/20/2021 · Severe ischemic cardiomyopathy, LVEF 20-25%; stage D, NYHA class IV symptoms; inotrope dependent;    Cardiac surgery       Gastroenterology 5/24/21 Will plan for EGD/colonoscopy as pre-LVAD work up whenever cardiology team feels patient is ready and patient can be prepped   Nutrition 5/21/21 1. Encourage protein with all meals  2. Should be eating at least 75% of all meals  3. Add daily MVI     Palliative care   To be completed outpatient   Dental  6/3/21 Per scanned report in media: excellent dental health; no concerns noted.    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  2.  Monoclonal spike:  If he is felt to be a transplant candidate, we would consider bone marrow biopsy.  A smoldering myeloma would likely disqualify him from the list.  If he is not felt to be a transplant candidate, a small monoclonal spike without anemia, hypercalcemia, or renal dysfunction can likely be observed.     I recommend that he keep his office visit and follow up with Dr. Danette Gonzalez on 06/12/2021   Infectious diseases   n/a   Pulmonology  5/22/21 Left upper lobe lung mass is subpleural in location and easily accessible with transthoracic needle aspiration biopsy which could be arranged next week- he is on dual platelet therapy with Effient and interventional radiologist may prefer to hold this for a few days before biopsy can be attempted   Endocrinology   n/a   GYN exam   n/a   Urology   n/a   Podiatry   To be completed outpatient   Ophthalmology  6/11/21 Per scanned report in media - 1. NIDDM - no complications  2. No HTN vascular changes  3. Monitor yearly  Healthy retinas   Sleep medicine   CPAP   Frailty test   n/a   MOCA   n/a   VAD education   Ongoing   Tx education   Deferred      Psychosocial and financial evaluation:  Date:     :     :  Insurance coverage:  Transplant sites in network:   Recommendations:        Josi Sweeney RN  [address], [address]  Phone: [phone]  Fax: [phone]

## 2021-06-18 ENCOUNTER — DOCUMENTATION ONLY (OUTPATIENT)
Dept: CARDIOLOGY CLINIC | Age: 76
End: 2021-06-18

## 2021-06-18 NOTE — PROGRESS NOTES
LVAD/TRANSPLANT CANDIDATE SELECTION COMMITTEE DECISION  [address]   Date: 6/18/21   Committee Members:   Dr. Jodi Ramos MD PhD (Medical Director), Dr. Elle Smith MD (Surgical Director), TEO Interiano (Chief Nurse Practitioner), Ryan Nelson RN (VAD Coordinator), Danita Oshea RN (VAD Coordinator), JAKY SantiagoW (), Santiago Elilngton MS ([facility] Practice Manager)   Criteria Met:   Chronic systolic heart failure   Stage D, NYHA class IV symptoms   LVEF 20%   Failure to respond to GDMT for at least 45 of the last 60 days   Resting cardiac index < 2.2 L/min/m2   Inotrope dependence for more than 14 days   Absolute contraindications:   None   Relative contraindications:   Primary Pulmonary Adenocarcinoma    Decision:   Will place work up on hold at this time until staging of adenocarcinoma of the lung.     Plan of care discussion:  N/A   Additional comments:  N/A   Prepared by:   Danita Oshea RN   VAD Coordinator

## 2021-06-21 ENCOUNTER — APPOINTMENT (OUTPATIENT)
Dept: CARDIAC REHAB | Age: 76
End: 2021-06-21
Attending: INTERNAL MEDICINE

## 2021-06-23 ENCOUNTER — OFFICE VISIT (OUTPATIENT)
Dept: INTERNAL MEDICINE CLINIC | Age: 76
End: 2021-06-23
Payer: MEDICARE

## 2021-06-23 VITALS
RESPIRATION RATE: 16 BRPM | BODY MASS INDEX: 25.15 KG/M2 | HEART RATE: 58 BPM | HEIGHT: 74 IN | SYSTOLIC BLOOD PRESSURE: 107 MMHG | WEIGHT: 196 LBS | OXYGEN SATURATION: 96 % | TEMPERATURE: 97.1 F | DIASTOLIC BLOOD PRESSURE: 68 MMHG

## 2021-06-23 DIAGNOSIS — R91.8 LUNG MASS: ICD-10-CM

## 2021-06-23 DIAGNOSIS — I25.10 ATHEROSCLEROSIS OF NATIVE CORONARY ARTERY OF NATIVE HEART WITHOUT ANGINA PECTORIS: ICD-10-CM

## 2021-06-23 DIAGNOSIS — I24.9 ACS (ACUTE CORONARY SYNDROME) (HCC): ICD-10-CM

## 2021-06-23 DIAGNOSIS — E11.51 TYPE 2 DIABETES, CONTROLLED, WITH PERIPHERAL CIRCULATORY DISORDER (HCC): ICD-10-CM

## 2021-06-23 DIAGNOSIS — I50.9 CHF, STAGE C (HCC): Primary | ICD-10-CM

## 2021-06-23 PROCEDURE — G8536 NO DOC ELDER MAL SCRN: HCPCS | Performed by: INTERNAL MEDICINE

## 2021-06-23 PROCEDURE — G8417 CALC BMI ABV UP PARAM F/U: HCPCS | Performed by: INTERNAL MEDICINE

## 2021-06-23 PROCEDURE — 3017F COLORECTAL CA SCREEN DOC REV: CPT | Performed by: INTERNAL MEDICINE

## 2021-06-23 PROCEDURE — G8427 DOCREV CUR MEDS BY ELIG CLIN: HCPCS | Performed by: INTERNAL MEDICINE

## 2021-06-23 PROCEDURE — 2022F DILAT RTA XM EVC RTNOPTHY: CPT | Performed by: INTERNAL MEDICINE

## 2021-06-23 PROCEDURE — 1111F DSCHRG MED/CURRENT MED MERGE: CPT | Performed by: INTERNAL MEDICINE

## 2021-06-23 PROCEDURE — 1101F PT FALLS ASSESS-DOCD LE1/YR: CPT | Performed by: INTERNAL MEDICINE

## 2021-06-23 PROCEDURE — 99214 OFFICE O/P EST MOD 30 MIN: CPT | Performed by: INTERNAL MEDICINE

## 2021-06-23 PROCEDURE — G8510 SCR DEP NEG, NO PLAN REQD: HCPCS | Performed by: INTERNAL MEDICINE

## 2021-06-23 RX ORDER — PRASUGREL 10 MG/1
10 TABLET, FILM COATED ORAL DAILY
Qty: 1 TABLET | Refills: 0
Start: 2021-06-23 | End: 2021-07-09 | Stop reason: SDUPTHER

## 2021-06-23 NOTE — PROGRESS NOTES
Verified name and birth date for privacy precautions. Chart reviewed in preparation for today's visit.      Chief Complaint   Patient presents with    Diabetes          Health Maintenance Due   Topic    Shingrix Vaccine Age 49> (1 of 2)    Eye Exam Retinal or Dilated     Colorectal Cancer Screening Combo          Wt Readings from Last 3 Encounters:   06/23/21 196 lb (88.9 kg)   06/16/21 192 lb (87.1 kg)   06/08/21 189 lb (85.7 kg)     Temp Readings from Last 3 Encounters:   06/23/21 97.1 °F (36.2 °C) (Temporal)   06/16/21 (!) 96.7 °F (35.9 °C) (Oral)   06/14/21 98.3 °F (36.8 °C)     BP Readings from Last 3 Encounters:   06/23/21 107/68   06/16/21 124/80   06/14/21 134/88     Pulse Readings from Last 3 Encounters:   06/23/21 (!) 58   06/16/21 77   06/14/21 86         Learning Assessment:  :     Learning Assessment 9/5/2017 4/20/2015 6/11/2014 3/13/2014 2/12/2014   PRIMARY LEARNER Patient Patient Patient Patient Patient   HIGHEST LEVEL OF EDUCATION - PRIMARY LEARNER  - GRADUATED HIGH SCHOOL OR GED GRADUATED HIGH SCHOOL OR GED GRADUATED HIGH SCHOOL OR GED GRADUATED HIGH SCHOOL OR GED   BARRIERS PRIMARY LEARNER - - NONE NONE NONE   CO-LEARNER CAREGIVER - No Yes Yes No   CO-LEARNER NAME - - Tavon black wife -   R Claudia Ramirez 76 - - 2 YEARS OF COLLEGE 2 YEARS [facility] 57 - - ENGLISH ENGLISH -    NEED - - No - -   LEARNER PREFERENCE PRIMARY OTHER (COMMENT) DEMONSTRATION DEMONSTRATION DEMONSTRATION DEMONSTRATION   LEARNER PREFERENCE CO-LEARNER - - DEMONSTRATION DEMONSTRATION -   ANSWERED BY patient self patient self patient   RELATIONSHIP SELF SELF SELF SELF SELF   ASSESSMENT COMMENT - - listening, audio - -       Depression Screening:  :     3 most recent PHQ Screens 6/23/2021   Little interest or pleasure in doing things Not at all   Feeling down, depressed, irritable, or hopeless Not at all   Total Score PHQ 2 0       Fall Risk Assessment:  :     Fall Risk Assessment, last 12 mths 6/23/2021   Able to walk? Yes   Fall in past 12 months? 0   Do you feel unsteady? 0   Are you worried about falling 0   Number of falls in past 12 months -   Fall with injury? -       Abuse Screening:  :     Abuse Screening Questionnaire 6/23/2021   Do you ever feel afraid of your partner? N   Are you in a relationship with someone who physically or mentally threatens you? N   Is it safe for you to go home?  Maria Alejandra Okeefe

## 2021-06-23 NOTE — PROGRESS NOTES
HISTORY OF PRESENT ILLNESS  Jaye Child is a 77 y.o. male. HPI  Assessment: Angela Rodriguez is seen today accompanied by his wife Alonso Cornejo for follow up of congestive heart failure and new diagnosis of lung cancer. 1) Lung cancer. He saw his oncologist Dr. Deonte Bland today. PET scan is pending. Likely he will receive radiation therapy. This likely will produce him having the LVAD for congestive heart failure. See below. 2) Congestive heart failure. Up to date with cardiology follow up. He continues with intravenous milrinone. He follows up with cardiology as directed. I have asked him to check with them regarding restarting Effient. 3) Diabetes. Blood sugars are doing pretty well running in the low 100s. His cardiologist is Dr. Grisel Waller. He wonders about starting Jardiance. I think we can hold off on this for now given several ongoing issues. Further his diabetes control is pretty good. Review of Systems   Constitutional: Negative for weight loss. Respiratory: Positive for shortness of breath. Negative for cough and hemoptysis. Cardiovascular: Negative for chest pain, palpitations, leg swelling and PND. Musculoskeletal: Negative for myalgias. Neurological: Negative for focal weakness. Physical Exam  Vitals and nursing note reviewed. Constitutional:       General: He is not in acute distress. Neck:      Vascular: No carotid bruit. Cardiovascular:      Rate and Rhythm: Normal rate and regular rhythm. Frequent extrasystoles are present. Heart sounds: No murmur heard. No friction rub. No gallop. Pulmonary:      Effort: Pulmonary effort is normal. No respiratory distress. Breath sounds: Normal breath sounds. Musculoskeletal:      Right lower leg: No edema. Left lower leg: No edema. ASSESSMENT and PLAN  Diagnoses and all orders for this visit:    1. CHF, stage C (HCC)  -     prasugreL (Effient) 10 mg tablet; Take 1 Tablet by mouth daily. Per cardiology    2.  ACS (acute coronary syndrome) ([facility] [address]    3. Type 2 diabetes, controlled, with peripheral circulatory disorder ([facility] [address]    4. Atherosclerosis of native coronary artery of native heart without angina pectoris    5.  Lung mass

## 2021-06-25 ENCOUNTER — TELEPHONE (OUTPATIENT)
Dept: CARDIOLOGY CLINIC | Age: 76
End: 2021-06-25

## 2021-06-25 NOTE — TELEPHONE ENCOUNTER
KIRK Alfaro, KIRK; Arabella Real, RN  Mr Leena Castro had a lung biopsy which showed primary pulmonary adenocarcinoma. His LVAD work up is on hold. He has a Pet scan ordered by Pulmonary. As you know the dextrose in his milrinone will interfere with a clear result. I called Juan (pharm) at [place] [id number] who states patient can have the dextrose in his Milrinone switched to Normal Saline 48 hours before his Pet Scan (and then switched back). It doesn't look like the Petscan has been scheduled yet. I just wanted to give you a heads up to keep an eye on it as [facility] will need to be contacted to switch the prescription. Thanks. From: Jalen Freed RN   Sent: 6/25/2021  11:20 AM EDT   To: Val Granado NP, Lisbet Ayoub NP, *     Priscila, just a reminder, patient is scheduled for Pet Scan on 7/1/21. Could you make arrangements for his Milrinone to be switched to Normal Saline from Dextrose? thanks        Called [facility] and spoke with the pharmacist Omar Menjivar. Notified him that patient has been scheduled for his pet scan on 7/1/21. He stated understanding and will have patient transition to milrinone with normal saline for 48 hr prior to scan and then will transition patient back to milrinone in dextrose after scan. He had no further questions.  Lee Fenton RN

## 2021-06-29 ENCOUNTER — TELEPHONE (OUTPATIENT)
Dept: CARDIAC REHAB | Age: 76
End: 2021-06-29

## 2021-06-29 NOTE — TELEPHONE ENCOUNTER
TELEPHONE ENCOUNTER: OP NUTRITION APPOINTMENT     Reminder call placed on 6/29/2021    Outcome:                 [x] Patient confirmed appointment              [] Patient rescheduled appointment              [] Unable to reach              [] Left message              [] Other:        Addi Sams RD

## 2021-06-30 ENCOUNTER — TELEPHONE (OUTPATIENT)
Dept: CARDIOLOGY CLINIC | Age: 76
End: 2021-06-30

## 2021-06-30 NOTE — TELEPHONE ENCOUNTER
Received a phone message from patient's wife inquiring about dextrose in Milrinone and Pet Scan scheduled for tomorrow. Called and left a message letting them know that JACKELYN Kinsey RN had called [email or web address] on 6/25/21 to have Milrinone switched to Normal Saline 48 hours before procedure. Requested a call back.

## 2021-07-01 ENCOUNTER — HOSPITAL ENCOUNTER (OUTPATIENT)
Dept: PET IMAGING | Age: 76
Discharge: HOME | End: 2021-07-01
Attending: NURSE PRACTITIONER
Payer: MEDICARE

## 2021-07-01 ENCOUNTER — HOSPITAL ENCOUNTER (OUTPATIENT)
Dept: CARDIAC REHAB | Age: 76
Discharge: HOME | End: 2021-07-01
Attending: INTERNAL MEDICINE
Payer: MEDICARE

## 2021-07-01 VITALS — BODY MASS INDEX: 25.31 KG/M2 | HEIGHT: 73 IN | WEIGHT: 191 LBS

## 2021-07-01 VITALS — BODY MASS INDEX: 25.23 KG/M2 | WEIGHT: 191.2 LBS

## 2021-07-01 DIAGNOSIS — C34.92 ADENOCARCINOMA OF LUNG, LEFT (HCC): ICD-10-CM

## 2021-07-01 LAB
GLUCOSE BLD STRIP.AUTO-MCNC: 96 MG/DL (ref 65–117)
SERVICE CMNT-IMP: NORMAL

## 2021-07-01 PROCEDURE — 97802 MEDICAL NUTRITION INDIV IN: CPT | Performed by: DIETITIAN, REGISTERED

## 2021-07-01 PROCEDURE — A9552 F18 FDG: HCPCS

## 2021-07-01 RX ORDER — SODIUM CHLORIDE 0.9 % (FLUSH) 0.9 %
10 SYRINGE (ML) INJECTION
Status: COMPLETED | OUTPATIENT
Start: 2021-07-01 | End: 2021-07-01

## 2021-07-01 RX ORDER — FLUDEOXYGLUCOSE F-18 200 MCI/ML
10 INJECTION INTRAVENOUS ONCE
Status: COMPLETED | OUTPATIENT
Start: 2021-07-01 | End: 2021-07-01

## 2021-07-01 RX ADMIN — Medication 10 ML: at 08:45

## 2021-07-01 RX ADMIN — FLUDEOXYGLUCOSE F-18 10 MILLICURIE: 200 INJECTION INTRAVENOUS at 08:45

## 2021-07-01 NOTE — PROGRESS NOTES
[facility] NOTE  DATE: 2021      REFERRING PHYSICIAN: Dr. Rukhsana Smith    NAME: Maite Murillo Sr : 1945 AGE: 77 y.o. GENDER: male    REASON FOR VISIT: CHF (diagnosed about 1 month ago), T2DM    PAST MEDICAL HISTORY:   Patient Active Problem List   Diagnosis Code    Calculus of kidney N20.0    Coronary atherosclerosis of native coronary artery I25.10    Benign neoplasm of colon D12.6    Unspecified sleep apnea G47.30    Reflux esophagitis K21.00    Encounter for long-term (current) use of other medications Z79.899    Insomnia, unspecified G47.00    Cardiac arrest ([facility] I46.9    Hematuria, gross R31.0    BPH without obstruction/lower urinary tract symptoms N40.0    Proteinuria R80.9    CHF, stage C ([facility] I50.9    Mixed hyperlipidemia E78.2    Type 2 diabetes, controlled, with peripheral circulatory disorder [facility] E11.51    Active advance directive on file Z78.9    Acute on chronic clinical systolic heart failure (HCC) I50.23    Severe protein-calorie malnutrition ([facility] E43    NSTEMI (non-ST elevated myocardial infarction) [facility] I21.4     H/o CABG 2012 or  and cardiac arrest 1 month later    Lung tumors were biopsied and showed lung cancer; radiation treatment pending    LABS:   Lab Results   Component Value Date/Time    Cholesterol, total 112 2021 04:28 PM    HDL Cholesterol 44 2021 04:28 PM    LDL, calculated 50.8 2021 04:28 PM    VLDL, calculated 17.2 2021 04:28 PM    Triglyceride 86 2021 04:28 PM    CHOL/HDL Ratio 2.5 2021 04:28 PM     Lab Results   Component Value Date/Time    Hemoglobin A1c 7.0 (H) 2021 04:28 PM     SMBG daily; today FBG 96  Denies any recurrent episodes of hypoglycemia     MEDICATIONS/SUPPLEMENTS:   [unfilled]  Prior to Admission medications    Medication Sig Start Date End Date Taking? Authorizing Provider   prasugreL (Effient) 10 mg tablet Take 1 Tablet by mouth daily.  Per cardiology 21 Zoey Rodrigez MD   metFORMIN (GLUCOPHAGE) 500 mg tablet Take 500 mg by mouth two (2) times daily (with meals). Provider, Historical   benzonatate (TESSALON) 200 mg capsule Take 1 Capsule by mouth three (3) times daily as needed for Cough. 6/4/21   Christelle Gustafson MD   eplerenone (INSPRA) 50 mg tab tablet Take 1 Tablet by mouth daily. 5/26/21   Li Stewart NP   isosorbide mononitrate ER (IMDUR) 30 mg tablet Take 1 Tablet by mouth every twelve (12) hours. 5/25/21   Li Stewart NP   ergocalciferol (ERGOCALCIFEROL) 1,250 mcg (50,000 unit) capsule Take 1 Capsule by mouth every seven (7) days for 11 doses. 5/28/21 8/7/21  Li Stewart NP   hydrALAZINE (APRESOLINE) 50 mg tablet Take 1 Tablet by mouth three (3) times daily. 5/25/21   Li Stewart NP   magnesium oxide (MAG-OX) 400 mg tablet Take 1 Tablet by mouth daily. 5/26/21   Li Stewart NP   milrinone (PRIMACOR) 20 mg/100 mL (200 mcg/mL) infusion 26.58 mcg/min by IntraVENous route continuous. Patient taking differently: 0.3 mcg/kg/min by IntraVENous route continuous. Based on weight of 88.6 kg 5/25/21   Danica Stewart NP   potassium chloride SR (K-TAB) 20 mEq tablet Take 2 Tablets by mouth two (2) times a day. 5/25/21   Oj Stewart NP   ondansetron hcl (ZOFRAN) 4 mg tablet Take 1 Tab by mouth every eight (8) hours as needed for Nausea, Vomiting or Nausea or Vomiting. 4/30/21   Christelle Krishna MD   rosuvastatin (CRESTOR) 10 mg tablet TAKE 1 TABLET NIGHTLY 4/26/21   Christelle Gustafson MD   omeprazole (PRILOSEC) 20 mg capsule Take 1 Cap by mouth daily. Indications: indigestion  Patient taking differently: Take 20 mg by mouth as needed.  Indications: indigestion 4/26/21   Zoey Rodrigez MD   tamsulosin [facility]) 0.4 mg capsule TAKE 1 CAPSULE DAILY 3/28/21   Christelle Gustafson MD   glipiZIDE SR (GLUCOTROL XL) 10 mg CR tablet TAKE 1 TABLET DAILY (DUE FOR OFFICE VISIT PLEASE CALL OFFICE TO SCHEDULE) 1/11/21   Sachi Gustafson MD   glucose blood VI test strips (OneTouch Ultra Blue Test Strip) strip Use to test blood sugar daily 6/20/20   Sachi Gustafson MD   lancets misc Use to test blood sugar daily 6/16/20   Sachi Gustafson MD   bumetanide (BUMEX) 2 mg tablet Take  by mouth. 1/2 tab every other day. 6/19/19   Provider, Historical   acetaminophen (TYLENOL EXTRA STRENGTH) 500 mg tablet Take  by mouth every six (6) hours as needed. Provider, Historical   aspirin delayed-release 81 mg tablet Take 81 mg by mouth daily.     Provider, Historical       EXERCISE/PHYSICAL ACTIVITY: \"supposed to be walking 30 minutes\" but his family has limited his activity due to his health conditions and weight loss    ALCOHOL / TOBACCO USE: very seldom alcohol; never used tobacco products    SOCIAL HISTORY: Mateo Miranda lives with his wife of 46 years, his supportive daughter accompanies him to today's visit; he worked as a iraheta prior to retiring    REPORTED WEIGHT HISTORY: heaviest >250 lbs but usual weight around 230-235 lbs, dropped down into 220s when changed diet to manage blood pressure; current weight is his highschool weight but he notes in highschool he had more muscle; states he believes he has lost 30 lbs in the past 3 months not counting fluid shifts but cannot recall specifics on timeline exactly        ANTHROPOMETRICS:    Ht Readings from Last 1 Encounters:   07/01/21 6' 1\" (1.854 m)      Wt Readings from Last 10 Encounters:   07/01/21 86.7 kg (191 lb 3.2 oz)   07/01/21 86.6 kg (191 lb)   06/23/21 88.9 kg (196 lb)   06/16/21 87.1 kg (192 lb)   06/08/21 85.7 kg (189 lb)   06/02/21 87.5 kg (193 lb)   06/01/21 87.8 kg (193 lb 9.6 oz)   05/28/21 87.1 kg (192 lb 0.3 oz)   05/25/21 88.6 kg (195 lb 5.2 oz)   05/20/21 97.7 kg (215 lb 6.4 oz)      IBW:184 # +/- 10%  %IBW: 104 % +/- 10%    BMI: 25.2 kg/M2  Category: overweight          NUTRITION ASSESSMENT:    FOOD ALLERGIES/INTOLERANCES: no known food allergies    24 HOUR DIET RECALL  Breakfast  2 eggs over easy cooked with unsalted butter, pc toast cooked in unsalted butter,  oatmeal mixed with grits (only ate a few spoonsfuls b/c remembered not supposed to for pre pet scan), water   Snack     Lunch  water   Snack     Dinner  [employer] (from [employer]), green beans (from [employer]), water   Snack       Terrial Brantingham Sr reports eating less over the past 6+ months because feeling fatigued, poor appetite, no food appealed and felt nauseated by some smells (e.g. bailey). Occasionally drinks Glucerna but not daily; states trying to meet fluid needs mostly drinking water, sometimes juice. Likes milk but doesn't drink it often, trying to drink water. Used to snack on nuts but stopped because made him cough, felt like they would catch in his throat. Yesterday was less food b/c preparing for PET scan couldn't eat starches. Typically he eats 3 meals per day: oatmeal and egg for breakfast, a lot of fruit with meals and for snacks, lunch generally is leftovers from dinner, dinner is mostly grilled or baked fish or chicken with a veggie. Has been eating out more this week than usual b/c his wife is sick and she does the shopping & cooking. Daughter states she tried to teach her mom to read labels for sodium. He and his family are looking for ideas on what he can eat to help him gain weight without making his heart failure worse. NUTRITION DIAGNOSIS:  Food and nutrition related knowledge deficit related to lack of nutrition education and new diagnosis of heart failure as evidenced by pt request for RD education and counseling.       ESTIMATED ENERGY NEEDS:  [id number] for weight maintenance (using [address] with AF 1.2-1.3 and IF of 1.1-1.2); + 500 for weight gain of 1 lb per week    ESTIMATED PROTEIN NEEDS: 104 g/ day (1.2 g/kg)    NUTRITION INTERVENTION:  Nutrition 60 minute one-on-one education & goal setting with [address] [place] [address] Que Hutchison with [place] Perales relevant labs compared to ideals. Reviewed weight history and patient's verbalized weight goal as well as any real or perceived barriers to obtaining the goal. Collaborated with patient to set a specific short and long term weight goal.     Conducted a verbal diet recall and assessed for environmental, financial, psychosocial, physical and comorbidities that may impact the food and eating patterns / behaviors of [phone] with patient to set specific nutrient goals as well as specific food / behavior changes that will help patient meet the overall goal of: CHF management and healthy weight gain    NUTRITION EDUCATION / HANDOUTS PROVIDED:  - Label reading for calories, sodium, protein  - Diabetes Plate  - Recipes (May and June)  - Academy of Nutrition and Dietetics patient education: \"Heart Failure Nutrition Therapy for the Undernourished\"  - Academy of [employer] and Dietetics patient education: \"Heart-Healthy Consistent Carbohydrate Nutrition Therapy\"  - Fast Food Best Bites  - Protein Sources and Amounts  -  to Go brochure      PATIENT GOALS:    Weight Goals:    Short Term Weight Goal: 191-195 lbs in 4 weeks  Long Term Weight Goal: 210 lbs    Nutrition Goals:    Daily Recommendations:  Calories: 2600  /day   Sodium: no more than 2000 mg/day  Protein: 100 g/day      Other:  - read and compare food labels  - suggested way to add 566 calories and 36 g protein per day with low appetite is drink 2 Glucerna per day and replace 16 oz of water with 2% milk      Specific tips and techniques to facilitate compliance with above recommendations were provided and discussed. [place] Perales verbalized understanding. The patient was encouraged to contact me as needed.       Follow-up to be scheduled              Timothy Gallego RD, [facility][note 3]

## 2021-07-05 DIAGNOSIS — R11.0 NAUSEA: ICD-10-CM

## 2021-07-06 ENCOUNTER — HOSPITAL ENCOUNTER (OUTPATIENT)
Dept: CARDIAC REHAB | Age: 76
Discharge: HOME | End: 2021-07-06
Payer: MEDICARE

## 2021-07-06 VITALS — WEIGHT: 198 LBS | BODY MASS INDEX: 26.24 KG/M2 | HEIGHT: 73 IN

## 2021-07-06 DIAGNOSIS — I50.9 CHF, STAGE C (HCC): ICD-10-CM

## 2021-07-06 PROCEDURE — 93798 PHYS/QHP OP CAR RHAB W/ECG: CPT

## 2021-07-06 PROCEDURE — 93797 PHYS/QHP OP CAR RHAB WO ECG: CPT

## 2021-07-06 RX ORDER — OMEPRAZOLE 20 MG/1
CAPSULE, DELAYED RELEASE ORAL
Qty: 30 CAPSULE | Refills: 1 | Status: SHIPPED | OUTPATIENT
Start: 2021-07-06 | End: 2022-05-06

## 2021-07-06 RX ORDER — RANOLAZINE 1000 MG/1
500 TABLET, EXTENDED RELEASE ORAL 2 TIMES DAILY
COMMUNITY
End: 2021-11-12 | Stop reason: SDUPTHER

## 2021-07-06 NOTE — CARDIO/PULMONARY
Asha Russo Sr  77 y.o. presented to cardiac wellness for orientation and exercise tolerance test today with a primary diagnosis of HF.  EF is 20-25% per echo 5/20/21. He also recently had a NSTEMI (May 2021). Shira Leone has a history of MI, CABG, HF, HTN, and ICD. Cardiac risk factors include HTN, Cancer of the lung, and long-term secondary tobacco exposure and these were reviewed with him. Shira Leone lives with his wife. His daughter lives in town and sees him almost daily. He is a retired iraheta. PHQ9, depression score, is 4 and this is considered mild. The result was discussed with patient who affirms score to be accurate. Patient denied chest pain or SOB during 6 minute walk and was in Afib with rare PVCs on telemetry. Shira Leone will exercise 3 times weekly in cardiac wellness. An education manual was given to the patient and reviewed briefly. Exceptions noted during intake include: pt. Is currently receiving IV Milrinone via R PICC.     Sarah Godoy RN  7/6/2021

## 2021-07-09 ENCOUNTER — HOSPITAL ENCOUNTER (OUTPATIENT)
Dept: CARDIAC REHAB | Age: 76
Discharge: HOME | End: 2021-07-09
Payer: MEDICARE

## 2021-07-09 VITALS — BODY MASS INDEX: 26.12 KG/M2 | WEIGHT: 198 LBS

## 2021-07-09 DIAGNOSIS — I50.9 CHF, STAGE C (HCC): ICD-10-CM

## 2021-07-09 PROCEDURE — 93798 PHYS/QHP OP CAR RHAB W/ECG: CPT

## 2021-07-09 RX ORDER — PRASUGREL 10 MG/1
10 TABLET, FILM COATED ORAL DAILY
Qty: 90 TABLET | Refills: 3 | Status: SHIPPED | OUTPATIENT
Start: 2021-07-09 | End: 2022-01-11 | Stop reason: ALTCHOICE

## 2021-07-12 ENCOUNTER — HOSPITAL ENCOUNTER (OUTPATIENT)
Dept: CARDIAC REHAB | Age: 76
Discharge: HOME | End: 2021-07-12
Payer: MEDICARE

## 2021-07-12 VITALS — WEIGHT: 201 LBS | BODY MASS INDEX: 26.52 KG/M2

## 2021-07-12 PROCEDURE — 93798 PHYS/QHP OP CAR RHAB W/ECG: CPT

## 2021-07-13 ENCOUNTER — OFFICE VISIT (OUTPATIENT)
Dept: CARDIOLOGY CLINIC | Age: 76
End: 2021-07-13
Payer: MEDICARE

## 2021-07-13 VITALS
WEIGHT: 193 LBS | HEART RATE: 85 BPM | RESPIRATION RATE: 20 BRPM | OXYGEN SATURATION: 97 % | HEIGHT: 73 IN | DIASTOLIC BLOOD PRESSURE: 64 MMHG | TEMPERATURE: 97.8 F | BODY MASS INDEX: 25.58 KG/M2 | SYSTOLIC BLOOD PRESSURE: 124 MMHG

## 2021-07-13 DIAGNOSIS — I50.22 SYSTOLIC CHF, CHRONIC (HCC): Primary | ICD-10-CM

## 2021-07-13 DIAGNOSIS — Z79.899 RECEIVING INOTROPIC MEDICATION: ICD-10-CM

## 2021-07-13 PROCEDURE — G8417 CALC BMI ABV UP PARAM F/U: HCPCS | Performed by: NURSE PRACTITIONER

## 2021-07-13 PROCEDURE — 99214 OFFICE O/P EST MOD 30 MIN: CPT | Performed by: NURSE PRACTITIONER

## 2021-07-13 PROCEDURE — 1101F PT FALLS ASSESS-DOCD LE1/YR: CPT | Performed by: NURSE PRACTITIONER

## 2021-07-13 PROCEDURE — G8432 DEP SCR NOT DOC, RNG: HCPCS | Performed by: NURSE PRACTITIONER

## 2021-07-13 PROCEDURE — 3017F COLORECTAL CA SCREEN DOC REV: CPT | Performed by: NURSE PRACTITIONER

## 2021-07-13 PROCEDURE — G8428 CUR MEDS NOT DOCUMENT: HCPCS | Performed by: NURSE PRACTITIONER

## 2021-07-13 PROCEDURE — G8536 NO DOC ELDER MAL SCRN: HCPCS | Performed by: NURSE PRACTITIONER

## 2021-07-13 NOTE — PROGRESS NOTES
[facility] in [place], [facility] Note    Patient name: Darius Kinney  Patient : 1945  Patient MRN: [id number]  Date of service: 21    Primary care physician: Danita Fabry, MD  Primary general cardiologist:  Dr. Adam Lopez    Primary F cardiologist: Sana Ayoub MD    Chief Complaint   Patient presents with    CHF        PLAN OF CARE:  · Severe ischemic cardiomyopathy, LVEF 20-25%; stage D, NYHA class IV symptoms; patient unerwent LVAD-DT evaluation; lung nodule biopsy was positive for adenocarcinoma, patient is now undergoing staging of cancer by oncology; the remainder of LVAD workup will be placed on hold including EGD/C-scope, until there is prognosis of cancer and plan available from oncology team  · I discussed with the patient and wife that if any other life-limiting condition is identified that would affect his 2-years survival this would be considered contraindication to LVAD implantation  · Meanwhile, we continue chronic palliative infusion of milrinone, and working on optimizing nutritional status and muscle conditioning     PLAN:  Continue current medical therapy for heart failure  Continue milrinone 0.3mcg/kg/min dosed to 88.6kg  Discontinued coreg due to RV dysfunction and in ancitipation of surgery  Discontinued ACEi/ARB/ARNi in anticipation of cardiac surgery  Consider resuming entresto if not candidate for LVAD per oncology  Continue current dose of eplerenone 50mg daily  Patient is not taking SGLT2 inhibitor; HgbA1C 7; will start at next visit   Continue bumex 1mg daily  Continue baby ASA   Consider resuming effient if considered not candidate for LVAD (washout prior to surgery)  Continue current dose of statin, check lipid profile, CPK and LFT  Continue ranolazine (for HR and angina control) and imdur 30mg daily  Continue CPAP therapy  Routine HF labs monthly  Cont Cardiac Rehab   Advanced care plan forms on file  Referral to nutritionist for supplements with diabetes  Genetic testing pending-   Follow up with Pulmonary  Follow up with hematology  Follow up with Radiologist   Return to [address] in 4 weeks with NP/MD      IMPRESSION:  Fatigue at rest  Shortness of breath with minimal exertion  Volume overload  Acute on chronic systolic heart failure  · Stage D, NYHA class IV symptoms improved to class II on inotropes  · Non-ischemic cardiomyopathy, LVEF 20% with LVEDD 6.2  · RV dysfunction, TAPSE 1.22 (prelimnary read)  · TBili 1.5 likely from cardiac congestion  At risk of sudden cardiac death  · Recent cardiac arrest   · S/p ICD (1/2013, [place] followed by [facility])  Coronary artery disease  · S/p multiple interventions  · S/p 4V CABG (8/2012)  · LHC (7/2019) severe stenosis of LIMA to LAD anastomosis site, SVG graft to OM is occluded, SVG graft to RCA 40-50%, LAD occluded proximally, severe proximal LCx, RCA is the long . · PET (6/2019) EF 24% with anterior lateral and inferior reversible defect  Cardiac risk factors  · HTN  · HL  · DM2  · SRUTHI on CPAP  · MIld carotid stenosis  Anemia, microcytic  · Iron deficiency  DVT with filter  Pulmonary nodules   Dysphagia      CARDIAC IMAGING:  Echo (5/20/21)  · LV: Estimated LVEF is 20 - 25%. Normal wall thickness. Mildly dilated left ventricle. Severely and globally reduced systolic function. Severe (grade 4) left ventricular diastolic dysfunction. · LA: Severely dilated left atrium. · RA: Severely dilated right atrium. · MV: Moderate mitral valve regurgitation is present. · TV: Moderate tricuspid valve regurgitation is present. · AO: Mild aortic root dilatation. · RV: Pacer/ICD present. · LVED 6.2cm  EKG (5/20/21) SB with 1degree AV block, QRS 116ms     [facility] 5/24/21 Native Coronaries: LM - moderate to severe distal disease 50%. % prox occluded (), heavily calcified, LCx: 80% proximal stenosis.  OM1 is  (seen filling faintly via collaterals). OM2 99% stenosis  RCA: 100% proximal occluded.   LIMA to LAD: patent, supplies only a diagonal branch (probably D2). The LAD distal to the bifurcation is 100% occluded () and fills via right to left collaterals off the SVG to   RCA.   SVG to R-PDA: patent with moderate diffuse irregularities but no obstructive disease in the graft.    No appealing interventional targets.      NST as above     ICD interrogation (5/12/21) [employer] single lead, no events, normal device function and good battery     HEMODYNAMICS:  RHC 5/24/21 CI 1.97, PA 33/9/17, RA 1, PCWP 6- off milrinone   CPEST not done      6 Min Walk Report 7/6/2021 5/20/2021   (PRE) HR 98 74   (PRE) O2 Sat - 99   (POST) HR - 81   (POST) O2 Sat 98% on Ra 99   Distance in Meters - 1158.24          OTHER IMAGING:  CXR (6/17/21) clear  CT 5/21/21 1. Irregular pulmonary nodule in the left upper lobe measuring 2 cm, suspicious for primary pulmonary malignancy. 2. Additional 6 mm left upper lobe pulmonary nodule. 3. Severe calcific atherosclerosis of the coronary arteries and abdominal aorta. No aneurysm. 4. Cardiomegaly. 5. No acute abnormality within the chest, abdomen, or pelvis.     HISTORY OF PRESENT ILLNESS:  I had the pleasure of seeing Heron Oconnlel Sr in [facility] at [address] [place] [address] in [address] [place] [address] a 77 y. o. male with h/o HTN, HL, DM2, SRUTHI on CPAP, chronic systolic heart failure, stage D, NYHA class IV symptoms, non-ischemic cardiomyopathy, LVEF 20% with LVEDD 6.2, RV dysfunciton, TAPSE 1.22, TBili 1.5 likely from cardiac congestion, recent cardiac arrest s/p ICD (1/2013, [facility] [facility] by [facility]), coronary artery disease s/p multiple interventions s/p 4V CABG (8/2012), LHC (7/2019) severe stenosis of LIMA to LAD anastomosis site, SVG graft to OM is occluded, SVG graft to RCA 40-50%, LAD occluded proximally, severe proximal LCx, RCA is the long . PET (6/2019) EF 24% with anterior lateral and inferior reversible defect. Anemia, microcytic with iron deficiency, DVT with filter.     Patient was referred to AHF Clinic by Dr. Lisandra Madden for evaluation of his candidacy for advanced therapies.     Patient agreed to inpatient initiation of palliative infusion of chronic inotropes and evaluation for LVAD-DT. Patient was admitted 5/2-5/25/21. Patient was started on milrinone infusion and had first part of LVAD evaluation completed. Right and left heart cath on 5/24/21 that showed severe diffuse native vessel CAD, with patent grafts (LIMA to D2, SVG to RCA). Antiplatelet therapy was held during hospitalization and after discharge due to anticipated pulmonary nodule biopsy, bone marrow biopsy and EGD/C-Scope as part of LVAD workup.     Patient was readmitted 5/26-5/28/21 with hypertension, headache and chest pain, his troponin peaked at 10; he was shortly bridged with heparin, otherwise had normal EKG and chest CT negative for PE. Cardiology and AHF service was consulted and patient was discharged home after troponin came down.      INTERVAL HISTORY:  Today, patient presents for HF clinic visit. He states he feels well overall, his can do his normal activities, denies issues with his PICC line and milrinone infusion. He had his PET scan on 7/1/21 that showed the RAVEN nodule but no other areas of hypermetabolism. He has appointments with Pulmonary on 7/14 and Radiology on 7/19. He is compliant with his medications, participating in cardiac rehab and his appetite has improved. REVIEW OF SYSTEMS:  General: Denies fever, night sweats. Ear, nose and throat: Denies difficulty hearing, sinus problems, runny nose, post-nasal drip, ringing in ears, mouth sores, loose teeth, ear pain, nosebleeds, sore throate, facial pain or numbess  Cardiovascular: see above in the interval history  Respiratory: Denies cough, wheezing, sputum production, hemoptysis.   Gastrointestinal: Denies heartburn, constipation, diarrhea, abdominal pain, nausea, vomiting, difficulty swallowing, blood in stool  Kidney and bladder: Denies painful urination, frequent urination, urgency  Musculoskeletal: Denies joint pain, muscle weakness  Skin and hair: Denies change in existing skin lesions, hair loss or increase, breast changes    PHYSICAL EXAM:  Visit Vitals  /64 (BP 1 Location: Left arm, BP Patient Position: Sitting, BP Cuff Size: Adult)   Pulse 85   Temp 97.8 °F (36.6 °C) (Oral)   Resp 20   Ht 6' 1\" (1.854 m)   Wt 193 lb (87.5 kg)   SpO2 97%   BMI 25.46 kg/m²     General: Patient is well developed, well-nourished in no acute distress  HEENT: Normocephalic and atraumatic. No scleral icterus. Pupils are equal, round and reactive to light and accomodation. No conjunctival injection. Oropharynx is clear. Neck: Supple. No evidence of thyroid enlargements or lymphadenopathy. JVD: Cannot be appreciated   Lungs: Breath sounds are equal and clear bilaterally. No wheezes, rhonchi, or rales. Heart: Regular rate and rhythm with normal S1 and S2. No murmurs, gallops or rubs. Abdomen: Soft, no mass or tenderness. No organomegaly or hernia. Bowel sounds present. Genitourinary and rectal: deferred  Extremities: No cyanosis, clubbing, or edema. Neurologic: No focal sensory or motor deficits are noted. Grossly intact. Psychiatric: Awake, alert an doriented x 3. Appropriate mood and affect. Skin: Warm, dry and well perfused. No lesions, nodules or rashes are noted.     PAST MEDICAL HISTORY:  Past Medical History:   Diagnosis Date    CAD (coronary artery disease) '90 '97, '13 x 2    MI, code ice in 2013 at [facility]    Calculus of kidney     Colonic polyps     Congestive heart failure, unspecified     Diabetes ([facility] [address]     Gastritis     Hypercholesterolemia     Sleep apnea        PAST SURGICAL HISTORY:  Past Surgical History:   Procedure Laterality Date    COLONOSCOPY  04/06/2011    16, due 21    ENDOSCOPY, COLON, DIAGNOSTIC      11, due 16    HX CORONARY ARTERY BYPASS GRAFT  8/22/12    x 4 vessel by MISSY Ramirez    HX HEENT      LASIK    HX PACEMAKER PLACEMENT  1/30/13    ME CARDIAC SURG PROCEDURE UNLIST  2012    x 4 vessels       FAMILY HISTORY:  Family History   Problem Relation Age of Onset    Heart Disease Mother         MI    Heart Disease Father         CAD & PVD    Lung Disease Father     Cancer Father         lung    Diabetes Maternal Grandmother     Heart Disease Other         CAD    Stroke Sister        SOCIAL HISTORY:  Social History     Socioeconomic History    Marital status:      Spouse name: Not on file    Number of children: Not on file    Years of education: Not on file    Highest education level: Not on file   Tobacco Use    Smoking status: Never Smoker    Smokeless tobacco: Never Used   Substance and Sexual Activity    Alcohol use: Yes     Alcohol/week: 0.0 standard drinks     Comment:  VERY RARE    Drug use: No     Social Determinants of Health     Financial Resource Strain:     Difficulty of Paying Living Expenses:    Food Insecurity:     Worried About Running Out of Food in the Last Year:     [address] in the Last Year:    Transportation Needs:     Lack of Transportation (Medical):  Lack of Transportation (Non-Medical):    Physical Activity:     Days of Exercise per Week:     Minutes of Exercise per Session:    Stress:     Feeling of Stress :    Social Connections:     Frequency of Communication with Friends and Family:     Frequency of Social Gatherings with Friends and Family:     Attends Rastafarian Services:     Active Member of Clubs or Organizations:     Attends Club or Organization Meetings:     Marital Status:        LABORATORY RESULTS:  Labs Latest Ref Rng & Units 6/8/2021 6/1/2021 5/28/2021 5/26/2021 5/25/2021 5/24/2021 5/23/2021   WBC 4.1 - 11.1 K/uL 3.8(L) 4.5 3.4(L) 4.4 4. 0(L) 3. 6(L) -   RBC 4.10 - 5.70 M/uL 5.45 5.76(H) 5.49 5.72(H) 5.52 5.78(H) -   Hemoglobin 12.1 - 17.0 g/dL 13.0 13.6 12.9 13.6 12.8 13.4 -   Hematocrit 36.6 - 50.3 % 41.5 43.5 41.8 43.2 41.4 43.7 -   MCV 80.0 - 99.0 FL 76. 1(L) 75. 5(L) 76. 1(L) 75. 5(L) 75. 0(L) 75. 6(L) -   Platelets 860 - 986 K/uL 195 215 165 160 158 152 -   Lymphocytes 12 - 49 % 19 - 35 19 - - -   Monocytes 5 - 13 % 13 - 16(H) 11 - - -   Eosinophils 0 - 7 % 1 - 3 1 - - -   Basophils 0 - 1 % 1 - 1 1 - - -   Albumin 3.5 - 5.0 g/dL - - 3. 3(L) 3.6 3. 2(L) 3.4(L) -   Calcium 8.5 - 10.1 MG/DL - - 9.2 9.6 8.9 9.6 9.1   Glucose 65 - 100 mg/dL - - 217(H) 281(H) 177(H) 125(H) 187(H)   BUN 6 - 20 MG/DL - - 14 12 13 16 14   Creatinine 0.70 - 1.30 MG/DL - - 0.90 1.00 0.97 1.10 1.13   Sodium 136 - 145 mmol/L - - 133(L) 132(L) 134(L) 135(L) 135(L)   Potassium 3.5 - 5.1 mmol/L - - 4.2 4.4 4.7 4.1 4.1   TSH 0.36 - 3.74 uIU/mL - - - 1.47 - - -   PSA 0.01 - 4.0 ng/mL - - - - - - -   LDH 85 - 241 U/L - - - - - - -   Some recent data might be hidden       ALLERGY:  Allergies   Allergen Reactions    Pcn [Penicillins] Hives        CURRENT MEDICATIONS:    Current Outpatient Medications:     prasugreL (Effient) 10 mg tablet, Take 1 Tablet by mouth daily. Per cardiology, Disp: 90 Tablet, Rfl: 3    omeprazole (PRILOSEC) 20 mg capsule, TAKE 1 CAPSULE BY MOUTH DAILY FOR INDIGESTION, Disp: 30 Capsule, Rfl: 1    ranolazine ER (RANEXA) 1,000 mg, Take 1,000 mg by mouth two (2) times a day., Disp: , Rfl:     metFORMIN (GLUCOPHAGE) 500 mg tablet, Take 500 mg by mouth two (2) times daily (with meals). , Disp: , Rfl:     benzonatate (TESSALON) 200 mg capsule, Take 1 Capsule by mouth three (3) times daily as needed for Cough. , Disp: 30 Capsule, Rfl: 2    eplerenone (INSPRA) 50 mg tab tablet, Take 1 Tablet by mouth daily. , Disp: 90 Tablet, Rfl: 1    isosorbide mononitrate ER (IMDUR) 30 mg tablet, Take 1 Tablet by mouth every twelve (12) hours. , Disp: 180 Tablet, Rfl: 1    ergocalciferol (ERGOCALCIFEROL) 1,250 mcg (50,000 unit) capsule, Take 1 Capsule by mouth every seven (7) days for 11 doses. , Disp: 11 Capsule, Rfl: 0    hydrALAZINE (APRESOLINE) 50 mg tablet, Take 1 Tablet by mouth three (3) times daily. , Disp: 180 Tablet, Rfl: 2    magnesium oxide (MAG-OX) 400 mg tablet, Take 1 Tablet by mouth daily. , Disp: 90 Tablet, Rfl: 1    milrinone (PRIMACOR) 20 mg/100 mL (200 mcg/mL) infusion, 26.58 mcg/min by IntraVENous route continuous. (Patient taking differently: 0.3 mcg/kg/min by IntraVENous route continuous. Based on weight of 88.6 kg), Disp: 100 mL, Rfl: 0    potassium chloride SR (K-TAB) 20 mEq tablet, Take 2 Tablets by mouth two (2) times a day., Disp: 240 Tablet, Rfl: 1    ondansetron hcl (ZOFRAN) 4 mg tablet, Take 1 Tab by mouth every eight (8) hours as needed for Nausea, Vomiting or Nausea or Vomiting., Disp: 20 Tab, Rfl: 1    rosuvastatin (CRESTOR) 10 mg tablet, TAKE 1 TABLET NIGHTLY, Disp: 90 Tab, Rfl: 3    tamsulosin (FLOMAX) 0.4 mg capsule, TAKE 1 CAPSULE DAILY, Disp: 90 Cap, Rfl: 3    glipiZIDE SR (GLUCOTROL XL) 10 mg CR tablet, TAKE 1 TABLET DAILY (DUE FOR OFFICE VISIT PLEASE CALL OFFICE TO SCHEDULE), Disp: 30 Tab, Rfl: 0    glucose blood VI test strips (OneTouch Ultra Blue Test Strip) strip, Use to test blood sugar daily, Disp: 100 Strip, Rfl: 11    lancets misc, Use to test blood sugar daily, Disp: 100 Each, Rfl: 11    bumetanide (BUMEX) 2 mg tablet, Take  by mouth. 1/2 tab every other day., Disp: , Rfl: 0    acetaminophen (TYLENOL EXTRA STRENGTH) 500 mg tablet, Take  by mouth every six (6) hours as needed. , Disp: , Rfl:     aspirin delayed-release 81 mg tablet, Take 81 mg by mouth daily. , Disp: , Rfl:     Thank you for your referral and allowing me to participate in this patient's care.     No Dangelo NP  [facility] [address] [place]  [address] [facility], [address]  Phone: [phone]    PATIENT CARE TEAM:  Patient Care Team:  Sabino Lock MD as PCP - General (Internal Medicine)  Sj Maco Goodman MD as PCP - [facility] Empaneled Provider  Viky Neely MD as Physician (Cardiology)  Shoaib Dill MD as Physician (Cardiology)  Edson Balderas MD as Physician (Gastroenterology)  Romana Carrion, MD as Physician (Orthopedic Surgery)  Huong Bess MD as Physician (Ophthalmology)  Phebe Luck Wilms, MD as Physician ([address])  Hyacinth Patino MD (Cardiology)  Carley Gomez MD (Cardiology)     Total visit time: 40 minutes (> 50% spent face-to-face counseling)

## 2021-07-14 ENCOUNTER — APPOINTMENT (OUTPATIENT)
Dept: CARDIAC REHAB | Age: 76
End: 2021-07-14
Payer: MEDICARE

## 2021-07-16 ENCOUNTER — HOSPITAL ENCOUNTER (OUTPATIENT)
Dept: CARDIAC REHAB | Age: 76
Discharge: HOME | End: 2021-07-16
Payer: MEDICARE

## 2021-07-16 VITALS — WEIGHT: 198.4 LBS | BODY MASS INDEX: 26.18 KG/M2

## 2021-07-16 PROCEDURE — 93798 PHYS/QHP OP CAR RHAB W/ECG: CPT

## 2021-07-18 NOTE — PROCEDURES
[facility]  PULMONARY FUNCTION TEST    Name:  Ariadna Cole  MR#:  [id number]  :  1945  ACCOUNT #:  [de-identified]  DATE OF SERVICE:  2021      INDICATION:  LVAD workup. FINDINGS:  SPIROMETRY:  Reduced FVC, normal FEV1, normal FEV1/FVC ratio. LUNG VOLUME MEASUREMENTS:  Reduced TLC, reduced VC, reduced RV, normal RV/TLC ratio. DIFFUSION CAPACITY:  Normal diffusion capacity. IMPRESSION:  Moderate restrictive ventilatory defect based on spirometry. Moderate restrictive ventilatory defect based on lung volume measurements. Normal diffusion capacity.       Chloe Roberts MD      [id number]  D:  2021 16:46  T:  2021 19:42  JOB #:  [id number]

## 2021-07-19 ENCOUNTER — APPOINTMENT (OUTPATIENT)
Dept: CARDIAC REHAB | Age: 76
End: 2021-07-19
Payer: MEDICARE

## 2021-07-19 ENCOUNTER — TELEPHONE (OUTPATIENT)
Dept: CARDIAC REHAB | Age: 76
End: 2021-07-19

## 2021-07-19 NOTE — TELEPHONE ENCOUNTER
7/19/2021 Cardiac Wellness: Mr. Surinder Arias called to cancel today's appointment since he will not be finished at the radiology appointment he is at this morning, so he will come Wed and Friday this week.  Yosef Ye

## 2021-07-20 DIAGNOSIS — E11.51 TYPE 2 DIABETES, CONTROLLED, WITH PERIPHERAL CIRCULATORY DISORDER (HCC): ICD-10-CM

## 2021-07-20 RX ORDER — LANCETS 33 GAUGE
EACH MISCELLANEOUS
Qty: 100 LANCET | Refills: 3 | Status: SHIPPED | OUTPATIENT
Start: 2021-07-20 | End: 2022-05-06

## 2021-07-20 RX ORDER — BLOOD SUGAR DIAGNOSTIC
STRIP MISCELLANEOUS
Qty: 100 STRIP | Refills: 3 | Status: SHIPPED | OUTPATIENT
Start: 2021-07-20 | End: 2022-05-06

## 2021-07-21 ENCOUNTER — HOSPITAL ENCOUNTER (OUTPATIENT)
Dept: CARDIAC REHAB | Age: 76
Discharge: HOME | End: 2021-07-21
Payer: MEDICARE

## 2021-07-21 VITALS — WEIGHT: 201.8 LBS | BODY MASS INDEX: 26.62 KG/M2

## 2021-07-21 PROCEDURE — 93798 PHYS/QHP OP CAR RHAB W/ECG: CPT

## 2021-07-23 ENCOUNTER — HOSPITAL ENCOUNTER (OUTPATIENT)
Dept: CARDIAC REHAB | Age: 76
Discharge: HOME | End: 2021-07-23
Payer: MEDICARE

## 2021-07-23 VITALS — WEIGHT: 206.6 LBS | BODY MASS INDEX: 27.26 KG/M2

## 2021-07-23 PROCEDURE — 93798 PHYS/QHP OP CAR RHAB W/ECG: CPT

## 2021-07-26 ENCOUNTER — HOSPITAL ENCOUNTER (OUTPATIENT)
Dept: CARDIAC REHAB | Age: 76
Discharge: HOME | End: 2021-07-26
Payer: MEDICARE

## 2021-07-26 VITALS — BODY MASS INDEX: 26.04 KG/M2 | WEIGHT: 197.4 LBS

## 2021-07-26 PROCEDURE — 93798 PHYS/QHP OP CAR RHAB W/ECG: CPT

## 2021-07-28 ENCOUNTER — HOSPITAL ENCOUNTER (OUTPATIENT)
Dept: CARDIAC REHAB | Age: 76
Discharge: HOME | End: 2021-07-28
Payer: MEDICARE

## 2021-07-28 VITALS — WEIGHT: 199.4 LBS | BODY MASS INDEX: 26.31 KG/M2

## 2021-07-28 PROCEDURE — 93798 PHYS/QHP OP CAR RHAB W/ECG: CPT

## 2021-07-30 ENCOUNTER — HOSPITAL ENCOUNTER (OUTPATIENT)
Dept: CARDIAC REHAB | Age: 76
Discharge: HOME | End: 2021-07-30
Payer: MEDICARE

## 2021-07-30 ENCOUNTER — TELEPHONE (OUTPATIENT)
Dept: CARDIOLOGY CLINIC | Age: 76
End: 2021-07-30

## 2021-07-30 VITALS — WEIGHT: 200 LBS | BODY MASS INDEX: 26.39 KG/M2

## 2021-07-30 PROCEDURE — 93798 PHYS/QHP OP CAR RHAB W/ECG: CPT

## 2021-07-30 NOTE — TELEPHONE ENCOUNTER
Called and left a message for Dr. Jatinder Seth (EP at [id number])  ([phone]) to return call regarding removing ICD in order for patient to receive radiation. Awaiting call back. Received a call back from Aissatou Savage at Dr. Rashad Collins office. She stated she will speak with Dr. Schmid Prior and call back. [phone] - Received a call back from Aissatou Savage at Dr. Rashad Collins office. They will call and schedule patient to be seen at the end of next week. Jerona Kehr NP made aware.

## 2021-08-02 ENCOUNTER — HOSPITAL ENCOUNTER (OUTPATIENT)
Dept: CARDIAC REHAB | Age: 76
Discharge: HOME | End: 2021-08-02
Payer: MEDICARE

## 2021-08-02 VITALS — BODY MASS INDEX: 26.25 KG/M2 | WEIGHT: 199 LBS

## 2021-08-02 PROCEDURE — 93798 PHYS/QHP OP CAR RHAB W/ECG: CPT

## 2021-08-04 ENCOUNTER — HOSPITAL ENCOUNTER (OUTPATIENT)
Dept: CARDIAC REHAB | Age: 76
Discharge: HOME | End: 2021-08-04
Payer: MEDICARE

## 2021-08-04 ENCOUNTER — TELEPHONE (OUTPATIENT)
Dept: CARDIOLOGY CLINIC | Age: 76
End: 2021-08-04

## 2021-08-04 VITALS — WEIGHT: 197 LBS | BODY MASS INDEX: 25.99 KG/M2

## 2021-08-04 PROCEDURE — 93798 PHYS/QHP OP CAR RHAB W/ECG: CPT

## 2021-08-04 NOTE — TELEPHONE ENCOUNTER
Called patient using two patient identifiers. Spoke with patient and patients wife on speaker phone. Notified them on above information per ALICIA Wong on labs. They stated understanding and had no further questions. Cele BRADLEY     Called cardiac connection and spoke with Z notified her that patient will need repeat CMP on Friday. She stated understanding and had no further questions.  Soraya Correa RN

## 2021-08-04 NOTE — TELEPHONE ENCOUNTER
Labs reviewed, notable for K+ 5.5. Hold potassium supplements x 48 hrs, then have [facility] repeat CMP.

## 2021-08-06 ENCOUNTER — HOSPITAL ENCOUNTER (OUTPATIENT)
Dept: CARDIAC REHAB | Age: 76
Discharge: HOME | End: 2021-08-06
Payer: MEDICARE

## 2021-08-06 VITALS — WEIGHT: 199 LBS | BODY MASS INDEX: 26.25 KG/M2

## 2021-08-06 PROCEDURE — 93797 PHYS/QHP OP CAR RHAB WO ECG: CPT | Performed by: DIETITIAN, REGISTERED

## 2021-08-06 PROCEDURE — 93798 PHYS/QHP OP CAR RHAB W/ECG: CPT

## 2021-08-06 NOTE — CARDIO/PULMONARY
Cardiac Rehab Nutrition Assessment - 1:1 Evaluation           NAME: Brandy Fisher Sr : 1945 AGE: 77 y.o. GENDER: male  CARDIAC REHAB ADMITTING DIAGNOSIS: HF and NSTEMI    PROBLEM LIST:  Patient Active Problem List   Diagnosis Code    Calculus of kidney N20.0    Coronary atherosclerosis of native coronary artery I25.10    Benign neoplasm of colon D12.6    Unspecified sleep apnea G47.30    Reflux esophagitis K21.00    Encounter for long-term (current) use of other medications Z79.899    Insomnia, unspecified G47.00    Cardiac arrest ([facility] I46.9    Hematuria, gross R31.0    BPH without obstruction/lower urinary tract symptoms N40.0    Proteinuria R80.9    CHF, stage C ([facility] I50.9    Mixed hyperlipidemia E78.2    Type 2 diabetes, controlled, with peripheral circulatory disorder (HCC) E11.51    Active advance directive on file Z78.9    Acute on chronic clinical systolic heart failure (HCC) I50.23    Severe protein-calorie malnutrition ([facility] E43    NSTEMI (non-ST elevated myocardial infarction) ([facility] I21.4           LABS:   Lab Results   Component Value Date/Time    Hemoglobin A1c 7.0 (H) 2021 04:28 PM     Lab Results   Component Value Date/Time    Cholesterol, total 112 2021 04:28 PM    HDL Cholesterol 44 2021 04:28 PM    LDL, calculated 50.8 2021 04:28 PM    VLDL, calculated 17.2 2021 04:28 PM    Triglyceride 86 2021 04:28 PM    CHOL/HDL Ratio 2.5 2021 04:28 PM     SMBG daily; FBG range , but patient states typically around 80    MEDICATIONS/SUPPLEMENTS:   [unfilled]  Prior to Admission medications    Medication Sig Start Date End Date Taking?  Authorizing Provider   glucose blood VI test strips (OneTouch Ultra Test) strip USE TO TEST BLOOD SUGAR DAILY 21   Jie Gustafson MD   OneTouch Delica Plus Lancet 33 gauge misc USE TO TEST BLOOD SUGAR DAILY 21   Jie Gustafson MD   prasugreL (Effient) 10 mg tablet Take 1 Tablet by mouth daily. Per cardiology  Patient not taking: Reported on 7/13/2021 7/9/21   Charito Alba MD   omeprazole (PRILOSEC) 20 mg capsule TAKE 1 CAPSULE BY MOUTH DAILY FOR INDIGESTION 7/6/21   Man PAREDES NP   ranolazine ER (RANEXA) 1,000 mg Take 1,000 mg by mouth two (2) times a day. Provider, Historical   metFORMIN (GLUCOPHAGE) 500 mg tablet Take 500 mg by mouth two (2) times daily (with meals). Provider, Historical   benzonatate (TESSALON) 200 mg capsule Take 1 Capsule by mouth three (3) times daily as needed for Cough. 6/4/21   Cl Gustafson MD   eplerenone (INSPRA) 50 mg tab tablet Take 1 Tablet by mouth daily. 5/26/21   Madiha Stewart NP   isosorbide mononitrate ER (IMDUR) 30 mg tablet Take 1 Tablet by mouth every twelve (12) hours. 5/25/21   Madiha Stewart NP   ergocalciferol (ERGOCALCIFEROL) 1,250 mcg (50,000 unit) capsule Take 1 Capsule by mouth every seven (7) days for 11 doses. 5/28/21 8/7/21  Madiha Setwart NP   hydrALAZINE (APRESOLINE) 50 mg tablet Take 1 Tablet by mouth three (3) times daily. 5/25/21   Madiha Stewart NP   magnesium oxide (MAG-OX) 400 mg tablet Take 1 Tablet by mouth daily. 5/26/21   Madiha Stewart NP   milrinone (PRIMACOR) 20 mg/100 mL (200 mcg/mL) infusion 26.58 mcg/min by IntraVENous route continuous. Patient taking differently: 0.3 mcg/kg/min by IntraVENous route continuous. Based on weight of 88.6 kg 5/25/21   Danica Stewart NP   potassium chloride SR (K-TAB) 20 mEq tablet Take 2 Tablets by mouth two (2) times a day.  5/25/21   Fani Stewart NP   ondansetron hcl (ZOFRAN) 4 mg tablet Take 1 Tab by mouth every eight (8) hours as needed for Nausea, Vomiting or Nausea or Vomiting. 4/30/21   Cl Jerome MD   rosuvastatin (CRESTOR) 10 mg tablet TAKE 1 TABLET NIGHTLY 4/26/21   Charito Alba MD   tamsulosin (FLOMAX) 0.4 mg capsule TAKE 1 CAPSULE DAILY 3/28/21   Cl Gustafson MD   glipiZIDE SR (GLUCOTROL XL) 10 mg CR tablet TAKE 1 TABLET DAILY (DUE FOR OFFICE VISIT PLEASE CALL OFFICE TO SCHEDULE) 1/11/21   Diane Gustafson MD   glucose blood VI test strips (OneTouch Ultra Blue Test Strip) strip Use to test blood sugar daily 6/20/20   Diane Gustafson MD   lancets misc Use to test blood sugar daily 6/16/20   Diane Gustafson MD   bumetanide (BUMEX) 2 mg tablet Take  by mouth. 1/2 tab every other day. 6/19/19   Provider, Historical   acetaminophen (TYLENOL EXTRA STRENGTH) 500 mg tablet Take  by mouth every six (6) hours as needed. Provider, Historical   aspirin delayed-release 81 mg tablet Take 81 mg by mouth daily.     Provider, Historical           ANTHROPOMETRICS:    Ht Readings from Last 1 Encounters:   07/13/21 6' 1\" (1.854 m)      Wt Readings from Last 10 Encounters:   08/04/21 89.4 kg (197 lb)   08/02/21 90.3 kg (199 lb)   07/30/21 90.7 kg (200 lb)   07/28/21 90.4 kg (199 lb 6.4 oz)   07/26/21 89.5 kg (197 lb 6.4 oz)   07/23/21 93.7 kg (206 lb 9.6 oz)   07/21/21 91.5 kg (201 lb 12.8 oz)   07/16/21 90 kg (198 lb 6.4 oz)   07/13/21 87.5 kg (193 lb)   07/12/21 91.2 kg (201 lb)      IBW:184 # +/- 10%  %IBW: 107 % +/- 10%    BMI: 26 kg/M2  Category: overweight  Waist: 39 inches    Reported Wt Hx: see outpatient nutrition note for details    Reported Diet Hx:    Rate Your Plate Score: 54  (Score 58-72: Making many healthy choices; 41-57: Some choices need improving 24-40: many choices need improving)    24 HOUR DIET RECALL  Breakfast HB egg, smoothie (6-8 oz) - made all fresh fruit and sometimes beth seeds or nuts; small bowl of Cheerios with 2% milk   Lunch [employer] spicy chicken sandwich (no pickle), fries and sweet tea (split with wife)   Dinner Chicken salad ([place]) on lettuce leaf, broccoli, 1 cracker (\"tasted like cardboard\"), water   Snacks Smoothie and large plum to take with pills   Beverages OJ late at night, really thirsty and BG had been 71 the other day     Aaron Li Sr states he has been reading labels for a long time for his son's needs. States if he knows the food isn't good for him there is no use in reading the label if he's going ot eat it anyway. Restaurant meals a few times per week. 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 Reviewed weight history and patient's verbalized weight goal as well as any real or perceived barriers to obtaining the goal. Collaborated with patient to set a specific short and long term weight goal.     Reviewed Rate Your Plate and conducted a verbal diet recall. Assessed for environmental, financial, psychosocial, physical and comorbidities that may impact the food and eating patterns / behaviors of [address] with patient to set specific nutrient goals as well as specific food / behavior changes that will help patient meet the overall goal of following a heart healthy eating pattern (using guidelines as set forth by the American Heart Association and modeled after healthful eating patterns as recognized by the USDA Dietary Guidelines such as DASH, Mediterranean or plant-based). Briefly reviewed with [facility] [place] the nutrition information in the Cardiac Rehab patient education book and encouraged Nabor Knott Sr to read thoroughly, ask questions as needed, and use for future reference for heart healthy nutrition information. [facility] Gal[facility] [place] is not scheduled to participate in Cardiac Rehab group nutrition classes.           PATIENT GOALS:    Weight Goals: maintain      Nutrition Goals:  Daily Recommendations:  Calories: 2600 /day  (see OP nutrition note for details)    Saturated Fat: no more than 17 g/day  Trans Fat: 0 g/day  Sodium: no more than 2000 mg/day  Fruit: 4 cups / day  Vegetables: 4+ cups/day    Other:  - read and compare food labels  - use Glucerna as needed for weight maintenance  - use previously provided fast food guide to help make heart healthy choices when dining out ([employer] date provided today) and / or look up nutrition facts prior to ordering, pay special attention to sodium                  Questions addressed. Follow-up plans discussed. [facility] verbalized understanding.             Salvatore Melgar RD

## 2021-08-09 ENCOUNTER — HOSPITAL ENCOUNTER (OUTPATIENT)
Dept: CARDIAC REHAB | Age: 76
Discharge: HOME | End: 2021-08-09
Payer: MEDICARE

## 2021-08-09 VITALS — BODY MASS INDEX: 26.65 KG/M2 | WEIGHT: 202 LBS

## 2021-08-09 PROCEDURE — 93798 PHYS/QHP OP CAR RHAB W/ECG: CPT

## 2021-08-10 RX ORDER — BENZONATATE 200 MG/1
CAPSULE ORAL
Qty: 30 CAPSULE | Refills: 2 | Status: SHIPPED | OUTPATIENT
Start: 2021-08-10 | End: 2021-09-16

## 2021-08-11 ENCOUNTER — HOSPITAL ENCOUNTER (OUTPATIENT)
Dept: CARDIAC REHAB | Age: 76
Discharge: HOME | End: 2021-08-11
Payer: MEDICARE

## 2021-08-11 VITALS — WEIGHT: 202 LBS | BODY MASS INDEX: 26.65 KG/M2

## 2021-08-11 PROCEDURE — 93798 PHYS/QHP OP CAR RHAB W/ECG: CPT

## 2021-08-12 ENCOUNTER — TELEPHONE (OUTPATIENT)
Dept: CARDIOLOGY CLINIC | Age: 76
End: 2021-08-12

## 2021-08-12 NOTE — TELEPHONE ENCOUNTER
[facility] and spoke with patient's wife. Per Alissa Morejon, patient is scheduled for his ICD removal at [facility] on 8/23/21. Called [facility] and left a message for Tyrel Mcdaniel with Dr. Lawrence Martinez office. Requested a call back to discuss if Dr. Larisa Presley would like [facility] to set up life vest or if [facility] has made arrangements. Awaiting a call back. [facility] with Niall Cordova from [facility]. Per Lefty Olvera has made arrangements for Life Vest following removal of ICD.

## 2021-08-13 ENCOUNTER — HOSPITAL ENCOUNTER (OUTPATIENT)
Dept: CARDIAC REHAB | Age: 76
Discharge: HOME | End: 2021-08-13
Payer: MEDICARE

## 2021-08-13 VITALS — WEIGHT: 198.6 LBS | BODY MASS INDEX: 26.2 KG/M2

## 2021-08-13 PROCEDURE — 93798 PHYS/QHP OP CAR RHAB W/ECG: CPT

## 2021-08-16 ENCOUNTER — HOSPITAL ENCOUNTER (OUTPATIENT)
Dept: CARDIAC REHAB | Age: 76
Discharge: HOME | End: 2021-08-16
Payer: MEDICARE

## 2021-08-16 VITALS — BODY MASS INDEX: 26.04 KG/M2 | WEIGHT: 197.4 LBS

## 2021-08-16 PROCEDURE — 93798 PHYS/QHP OP CAR RHAB W/ECG: CPT

## 2021-08-17 RX ORDER — ERGOCALCIFEROL 1.25 MG/1
CAPSULE ORAL
Qty: 11 CAPSULE | Refills: 0 | Status: SHIPPED | OUTPATIENT
Start: 2021-08-17 | End: 2021-11-11

## 2021-08-18 ENCOUNTER — TELEPHONE (OUTPATIENT)
Dept: CARDIOLOGY CLINIC | Age: 76
End: 2021-08-18

## 2021-08-18 ENCOUNTER — HOSPITAL ENCOUNTER (OUTPATIENT)
Dept: CARDIAC REHAB | Age: 76
Discharge: HOME | End: 2021-08-18
Payer: MEDICARE

## 2021-08-18 VITALS — WEIGHT: 196.6 LBS | BODY MASS INDEX: 25.94 KG/M2

## 2021-08-18 PROCEDURE — 93798 PHYS/QHP OP CAR RHAB W/ECG: CPT

## 2021-08-18 NOTE — TELEPHONE ENCOUNTER
Telephone Call RE:  Appointment reminder     Outcome:     [x] Patient confirmed appointment   [] Patient rescheduled appointment for    [] Unable to reach   [] Left message              [] Other:       Confirmed appt with patient's wife, screened for Covid. Reminder to arrive 15 minutes early for check-in and screening.   Nanda Salazar

## 2021-08-19 ENCOUNTER — OFFICE VISIT (OUTPATIENT)
Dept: CARDIOLOGY CLINIC | Age: 76
End: 2021-08-19
Payer: MEDICARE

## 2021-08-19 VITALS — BODY MASS INDEX: 25.99 KG/M2 | SYSTOLIC BLOOD PRESSURE: 112 MMHG | WEIGHT: 197 LBS | DIASTOLIC BLOOD PRESSURE: 64 MMHG

## 2021-08-19 DIAGNOSIS — Z79.899 RECEIVING INOTROPIC MEDICATION: Primary | ICD-10-CM

## 2021-08-19 DIAGNOSIS — I50.22 SYSTOLIC CHF, CHRONIC (HCC): ICD-10-CM

## 2021-08-19 PROCEDURE — G8428 CUR MEDS NOT DOCUMENT: HCPCS | Performed by: NURSE PRACTITIONER

## 2021-08-19 PROCEDURE — 3017F COLORECTAL CA SCREEN DOC REV: CPT | Performed by: NURSE PRACTITIONER

## 2021-08-19 PROCEDURE — 99214 OFFICE O/P EST MOD 30 MIN: CPT | Performed by: NURSE PRACTITIONER

## 2021-08-19 PROCEDURE — G8536 NO DOC ELDER MAL SCRN: HCPCS | Performed by: NURSE PRACTITIONER

## 2021-08-19 PROCEDURE — G8432 DEP SCR NOT DOC, RNG: HCPCS | Performed by: NURSE PRACTITIONER

## 2021-08-19 PROCEDURE — 1101F PT FALLS ASSESS-DOCD LE1/YR: CPT | Performed by: NURSE PRACTITIONER

## 2021-08-19 PROCEDURE — G8417 CALC BMI ABV UP PARAM F/U: HCPCS | Performed by: NURSE PRACTITIONER

## 2021-08-19 NOTE — PROGRESS NOTES
[facility] in [place], [facility] Note    Patient name: Nyla Ontiveros  Patient : 1945  Patient MRN: [id number]  Date of service: 21    Primary care physician: Jossie Zamudio MD  Primary general cardiologist:  Dr. Jennifer Kim    Primary [facility] cardiologist: Cindy Cannon MD    Chief Complaint   Patient presents with    CHF        PLAN OF CARE:  · Severe ischemic cardiomyopathy, LVEF 20-25%; stage D, NYHA class IV symptoms; patient unerwent LVAD-DT evaluation; lung nodule biopsy was positive for adenocarcinoma, patient is now undergoing staging of cancer by oncology; the remainder of LVAD workup will be placed on hold including EGD/C-scope, until there is prognosis of cancer and plan available from oncology team  · I discussed with the patient and wife that if any other life-limiting condition is identified that would affect his 2-years survival this would be considered contraindication to LVAD implantation  · Meanwhile, we continue chronic palliative infusion of milrinone, and working on optimizing nutritional status and muscle conditioning     PLAN:  Continue current medical therapy for heart failure  Continue milrinone 0.3mcg/kg/min dosed to 88.6kg  Discontinued coreg due to RV dysfunction and in ancitipation of surgery  Discontinued ACEi/ARB/ARNi in anticipation of cardiac surgery  Consider resuming entresto if not candidate for LVAD per oncology  Continue current dose of eplerenone 50mg daily  Continue Farxiga 10mg daily   Continue bumex 1mg daily  Continue baby ASA   Consider resuming effient if considered not candidate for LVAD (washout prior to surgery)  Continue current dose of statin  Continue ranolazine (for HR and angina control) and imdur 30mg daily  Continue CPAP therapy  Routine HF labs monthly  Cont Cardiac Rehab   Advanced care plan forms on file  Referral to nutritionist for supplements with diabetes  Genetic testing pending   Planned AICD removal on 8/23 in anticipation of radiation therapy  Follow up with Pulmonary  Follow up with hematology  Follow up with Radiologist - next appt 8/25, will need 5 treatments   [facility] with NP/MD   Consider Melatonin to help with sleep   Repeat 6 min walk at next visit.      IMPRESSION:  Fatigue at rest  Shortness of breath with minimal exertion  Volume overload  Acute on chronic systolic heart failure  · Stage D, NYHA class IV symptoms improved to class II on inotropes  · Non-ischemic cardiomyopathy, LVEF 20% with LVEDD 6.2  · RV dysfunction, TAPSE 1.22 (prelimnary read)  · TBili 1.5 likely from cardiac congestion  At risk of sudden cardiac death  · Recent cardiac arrest   · S/p ICD (1/2013, [employer] followed by [facility])  Coronary artery disease  · S/p multiple interventions  · S/p 4V CABG (8/2012)  · LHC (7/2019) severe stenosis of LIMA to LAD anastomosis site, SVG graft to OM is occluded, SVG graft to RCA 40-50%, LAD occluded proximally, severe proximal LCx, RCA is the long . · PET (6/2019) EF 24% with anterior lateral and inferior reversible defect  Cardiac risk factors  · HTN  · HL  · DM2  · SRUTHI on CPAP  · MIld carotid stenosis  Anemia, microcytic  · Iron deficiency  DVT with filter  Pulmonary nodules   Dysphagia      CARDIAC IMAGING:  Echo (5/20/21)  · LV: Estimated LVEF is 20 - 25%. Normal wall thickness. Mildly dilated left ventricle. Severely and globally reduced systolic function. Severe (grade 4) left ventricular diastolic dysfunction. · LA: Severely dilated left atrium. · RA: Severely dilated right atrium. · MV: Moderate mitral valve regurgitation is present. · TV: Moderate tricuspid valve regurgitation is present. · AO: Mild aortic root dilatation. · RV: Pacer/ICD present.   · LVED 6.2cm  EKG (5/20/21) SB with 1degree AV block, QRS 116ms     [facility] 5/24/21 Native Coronaries: LM - moderate to severe distal disease 50%.  % prox occluded (), heavily calcified, LCx: 80% proximal stenosis. OM1 is  (seen filling faintly via collaterals). OM2 99% stenosis  RCA: 100% proximal occluded.   LIMA to LAD: patent, supplies only a diagonal branch (probably D2). The LAD distal to the bifurcation is 100% occluded () and fills via right to left collaterals off the SVG to   RCA.   SVG to R-PDA: patent with moderate diffuse irregularities but no obstructive disease in the graft.    No appealing interventional targets.      NST as above     ICD interrogation (5/12/21) [place] single lead, no events, normal device function and good battery     HEMODYNAMICS:  RHC 5/24/21 CI 1.97, PA 33/9/17, RA 1, PCWP 6- off milrinone   CPEST not done      6 Min Walk Report 7/6/2021 5/20/2021   (PRE) HR 98 74   (PRE) O2 Sat - 99   (POST) HR - 81   (POST) O2 Sat 98% on Ra 99   Distance in Meters - 1158.24          OTHER IMAGING:  CXR (6/17/21) clear  CT 5/21/21 1. Irregular pulmonary nodule in the left upper lobe measuring 2 cm, suspicious for primary pulmonary malignancy. 2. Additional 6 mm left upper lobe pulmonary nodule. 3. Severe calcific atherosclerosis of the coronary arteries and abdominal aorta. No aneurysm. 4. Cardiomegaly. 5. No acute abnormality within the chest, abdomen, or pelvis.     HISTORY OF PRESENT ILLNESS:  I had the pleasure of seeing Heron Oconnell Sr in [facility] at [address] [place] [address] in [address] [place] [address] a 77 y. o. male with h/o HTN, HL, DM2, SRUTHI on CPAP, chronic systolic heart failure, stage D, NYHA class IV symptoms, non-ischemic cardiomyopathy, LVEF 20% with LVEDD 6.2, RV dysfunciton, TAPSE 1.22, TBili 1.5 likely from cardiac congestion, recent cardiac arrest s/p ICD (1/2013, [address] followed by [facility]), coronary artery disease s/p multiple interventions s/p 4V CABG (8/2012), LHC (7/2019) severe stenosis of LIMA to LAD anastomosis site, SVG graft to OM is occluded, SVG graft to RCA 40-50%, LAD occluded proximally, severe proximal LCx, RCA is the long . PET (6/2019) EF 24% with anterior lateral and inferior reversible defect. Anemia, microcytic with iron deficiency, DVT with filter.     Patient was referred to AHF Clinic by Dr. Chi Mcpherson for evaluation of his candidacy for advanced therapies.     Patient agreed to inpatient initiation of palliative infusion of chronic inotropes and evaluation for LVAD-DT. Patient was admitted 5/2-5/25/21. Patient was started on milrinone infusion and had first part of LVAD evaluation completed. Right and left heart cath on 5/24/21 that showed severe diffuse native vessel CAD, with patent grafts (LIMA to D2, SVG to RCA). Antiplatelet therapy was held during hospitalization and after discharge due to anticipated pulmonary nodule biopsy, bone marrow biopsy and EGD/C-Scope as part of LVAD workup.     Patient was readmitted 5/26-5/28/21 with hypertension, headache and chest pain, his troponin peaked at 10; he was shortly bridged with heparin, otherwise had normal EKG and chest CT negative for PE. Cardiology and AHF service was consulted and patient was discharged home after troponin came down.      INTERVAL HISTORY:  Today, patient presents for HF clinic visit. He states he feels well overall, his can do his normal activities, denies issues with his PICC line and milrinone infusion. He will have his AICD removed on Monday and then will meet with radiologist for his treatments. He will need a lifevest while AICD is gone. He is compliant with his medications, participating in cardiac rehab and his appetite has improved. REVIEW OF SYSTEMS:  General: Denies fever, night sweats.   Ear, nose and throat: Denies difficulty hearing, sinus problems, runny nose, post-nasal drip, ringing in ears, mouth sores, loose teeth, ear pain, nosebleeds, sore throate, facial pain or numbess  Cardiovascular: see above in the interval history  Respiratory: Denies cough, wheezing, sputum production, hemoptysis. Gastrointestinal: Denies heartburn, constipation, diarrhea, abdominal pain, nausea, vomiting, difficulty swallowing, blood in stool  Kidney and bladder: Denies painful urination, frequent urination, urgency  Musculoskeletal: Denies joint pain, muscle weakness  Skin and hair: Denies change in existing skin lesions, hair loss or increase, breast changes    PHYSICAL EXAM:  Visit Vitals  /64   Wt 197 lb (89.4 kg)   BMI 25.99 kg/m²     General: Patient is well developed, well-nourished in no acute distress  HEENT: Normocephalic and atraumatic. No scleral icterus. Pupils are equal, round and reactive to light and accomodation. No conjunctival injection. Oropharynx is clear. Neck: Supple. No evidence of thyroid enlargements or lymphadenopathy. JVD: Cannot be appreciated   Lungs: Breath sounds are equal and clear bilaterally. No wheezes, rhonchi, or rales. Heart: Regular rate and rhythm with normal S1 and S2. No murmurs, gallops or rubs. Abdomen: Soft, no mass or tenderness. No organomegaly or hernia. Bowel sounds present. Genitourinary and rectal: deferred  Extremities: No cyanosis, clubbing, or edema. Neurologic: No focal sensory or motor deficits are noted. Grossly intact. Psychiatric: Awake, alert an doriented x 3. Appropriate mood and affect. Skin: Warm, dry and well perfused. No lesions, nodules or rashes are noted.     PAST MEDICAL HISTORY:  Past Medical History:   Diagnosis Date    CAD (coronary artery disease) '90  '97, '13 x 2    [place] in [place] [facility]    Calculus of kidney     Colonic polyps     Congestive heart failure, unspecified     Diabetes ([facility] [address]     Gastritis     Hypercholesterolemia     Sleep apnea        PAST SURGICAL HISTORY:  Past Surgical History:   Procedure Laterality Date    COLONOSCOPY  04/06/2011    16, due 21    ENDOSCOPY, COLON, DIAGNOSTIC      11, due 16    HX CORONARY ARTERY BYPASS GRAFT 8/22/12    x 4 vessel by MISSY CABRALES HEENT      LASIK    HX PACEMAKER PLACEMENT  1/30/13    AL CARDIAC SURG PROCEDURE UNLIST  2012    x 4 vessels       FAMILY HISTORY:  Family History   Problem Relation Age of Onset    Heart Disease Mother         MI    Heart Disease Father         CAD & PVD    Lung Disease Father     Cancer Father         lung    Diabetes Maternal Grandmother     Heart Disease Other         CAD    Stroke Sister        SOCIAL HISTORY:  Social History     Socioeconomic History    Marital status:      Spouse name: Not on file    Number of children: Not on file    Years of education: Not on file    Highest education level: Not on file   Tobacco Use    Smoking status: Never Smoker    Smokeless tobacco: Never Used   Substance and Sexual Activity    Alcohol use: Yes     Alcohol/week: 0.0 standard drinks     Comment:  VERY RARE    Drug use: No     Social Determinants of Health     Financial Resource Strain:     Difficulty of Paying Living Expenses:    Food Insecurity:     Worried About Running Out of Food in the Last Year:     [address] in the Last Year:    Transportation Needs:     Lack of Transportation (Medical):  Lack of Transportation (Non-Medical):    Physical Activity:     Days of Exercise per Week:     Minutes of Exercise per Session:    Stress:     Feeling of Stress :    Social Connections:     Frequency of Communication with Friends and Family:     Frequency of Social Gatherings with Friends and Family:     Attends Islam Services:     Active Member of Clubs or Organizations:     Attends Club or Organization Meetings:     Marital Status:        LABORATORY RESULTS:  No flowsheet data found.     ALLERGY:  Allergies   Allergen Reactions    Pcn [Penicillins] Hives        CURRENT MEDICATIONS:    Current Outpatient Medications:     dapagliflozin (Farxiga) 10 mg tab tablet, Take 1 Tablet by mouth., Disp: , Rfl:     ergocalciferol (ERGOCALCIFEROL) 1,250 mcg (50,000 unit) capsule, TAKE 1 CAPSULE BY MOUTH EVERY 7 DAYS FOR 11 DOSES, Disp: 11 Capsule, Rfl: 0    benzonatate (TESSALON) 200 mg capsule, TAKE 1 CAPSULE BY MOUTH THREE TIMES DAILY AS NEEDED FOR COUGH, Disp: 30 Capsule, Rfl: 2    glucose blood VI test strips (OneTouch Ultra Test) strip, USE TO TEST BLOOD SUGAR DAILY, Disp: 100 Strip, Rfl: 3    OneTouch Delica Plus Lancet 33 gauge misc, USE TO TEST BLOOD SUGAR DAILY, Disp: 100 Lancet, Rfl: 3    omeprazole (PRILOSEC) 20 mg capsule, TAKE 1 CAPSULE BY MOUTH DAILY FOR INDIGESTION, Disp: 30 Capsule, Rfl: 1    ranolazine ER (RANEXA) 1,000 mg, Take 500 mg by mouth two (2) times a day., Disp: , Rfl:     metFORMIN (GLUCOPHAGE) 500 mg tablet, Take 500 mg by mouth two (2) times daily (with meals). , Disp: , Rfl:     eplerenone (INSPRA) 50 mg tab tablet, Take 1 Tablet by mouth daily. , Disp: 90 Tablet, Rfl: 1    isosorbide mononitrate ER (IMDUR) 30 mg tablet, Take 1 Tablet by mouth every twelve (12) hours. , Disp: 180 Tablet, Rfl: 1    hydrALAZINE (APRESOLINE) 50 mg tablet, Take 1 Tablet by mouth three (3) times daily. , Disp: 180 Tablet, Rfl: 2    magnesium oxide (MAG-OX) 400 mg tablet, Take 1 Tablet by mouth daily. , Disp: 90 Tablet, Rfl: 1    milrinone (PRIMACOR) 20 mg/100 mL (200 mcg/mL) infusion, 26.58 mcg/min by IntraVENous route continuous. (Patient taking differently: 0.3 mcg/kg/min by IntraVENous route continuous. Based on weight of 88.6 kg), Disp: 100 mL, Rfl: 0    ondansetron hcl (ZOFRAN) 4 mg tablet, Take 1 Tab by mouth every eight (8) hours as needed for Nausea, Vomiting or Nausea or Vomiting., Disp: 20 Tab, Rfl: 1    rosuvastatin (CRESTOR) 10 mg tablet, TAKE 1 TABLET NIGHTLY, Disp: 90 Tab, Rfl: 3    tamsulosin (FLOMAX) 0.4 mg capsule, TAKE 1 CAPSULE DAILY, Disp: 90 Cap, Rfl: 3    lancets misc, Use to test blood sugar daily, Disp: 100 Each, Rfl: 11    bumetanide (BUMEX) 2 mg tablet, Take  by mouth.  1/2 tab every other day., Disp: , Rfl: 0   acetaminophen (TYLENOL EXTRA STRENGTH) 500 mg tablet, Take  by mouth every six (6) hours as needed. , Disp: , Rfl:     aspirin delayed-release 81 mg tablet, Take 81 mg by mouth daily. , Disp: , Rfl:     prasugreL (Effient) 10 mg tablet, Take 1 Tablet by mouth daily. Per cardiology (Patient not taking: Reported on 7/13/2021), Disp: 90 Tablet, Rfl: 3    potassium chloride SR (K-TAB) 20 mEq tablet, Take 2 Tablets by mouth two (2) times a day. (Patient not taking: Reported on 8/19/2021), Disp: 240 Tablet, Rfl: 1    Thank you for your referral and allowing me to participate in this patient's care.     Manuel Carrera NP  [facility] [address], [address]  Phone: [phone]    PATIENT CARE TEAM:  Patient Care Team:  Rosales Cunningham MD as PCP - General (Internal Medicine)  Rosales Cunningham MD as PCP - [address] [place], [place] [address]led Provider  London Soto MD as Physician (Cardiology)  Bud Mcmahon MD as Physician (Cardiology)  Arty Lombard., MD as Physician (Gastroenterology)  Jared Foster MD as Physician (Orthopedic Surgery)  Krystyna Casas MD as Physician (Ophthalmology)  Ruven Alstrom Wilms, MD as Physician ([facility] [address])  Domonique Odom MD (Cardiology)  Poornima Upton MD (Cardiology)     Total visit time: 40 minutes (> 50% spent face-to-face counseling)

## 2021-08-20 ENCOUNTER — APPOINTMENT (OUTPATIENT)
Dept: CARDIAC REHAB | Age: 76
End: 2021-08-20
Payer: MEDICARE

## 2021-08-20 ENCOUNTER — TELEPHONE (OUTPATIENT)
Dept: CARDIOLOGY CLINIC | Age: 76
End: 2021-08-20

## 2021-08-20 NOTE — TELEPHONE ENCOUNTER
I called patient to reschedule his appointment on 9/16/21 at 11am with Beckie Chiu due to provider out of the office. Patient agreed to reschedule to 9/16/21 at 10:30am with Ketan Hernandez.

## 2021-08-25 ENCOUNTER — APPOINTMENT (OUTPATIENT)
Dept: CARDIAC REHAB | Age: 76
End: 2021-08-25
Payer: MEDICARE

## 2021-08-27 ENCOUNTER — TELEPHONE (OUTPATIENT)
Dept: CARDIAC REHAB | Age: 76
End: 2021-08-27

## 2021-08-27 ENCOUNTER — APPOINTMENT (OUTPATIENT)
Dept: CARDIAC REHAB | Age: 76
End: 2021-08-27
Payer: MEDICARE

## 2021-08-27 NOTE — TELEPHONE ENCOUNTER
Cardiac Rehab; Received a call from [address] [place] [address] stating he would be out of CR thru Labor day. He had his defibrillator removed and now has a Lifevest. He feels a bit overwhelmed with the alarms. Reinforced to call his cardiologist or the company for the [address] to get more support if needed. [address] [place] [address]  Will call us on 9/7 to update staff on return. Cynthia Chu RN

## 2021-08-30 ENCOUNTER — APPOINTMENT (OUTPATIENT)
Dept: CARDIAC REHAB | Age: 76
End: 2021-08-30
Payer: MEDICARE

## 2021-09-01 ENCOUNTER — OFFICE VISIT (OUTPATIENT)
Dept: INTERNAL MEDICINE CLINIC | Age: 76
End: 2021-09-01
Payer: MEDICARE

## 2021-09-01 ENCOUNTER — APPOINTMENT (OUTPATIENT)
Dept: CARDIAC REHAB | Age: 76
End: 2021-09-01

## 2021-09-01 VITALS
HEIGHT: 73 IN | HEART RATE: 64 BPM | OXYGEN SATURATION: 96 % | WEIGHT: 195.6 LBS | TEMPERATURE: 97.8 F | BODY MASS INDEX: 25.92 KG/M2 | DIASTOLIC BLOOD PRESSURE: 57 MMHG | RESPIRATION RATE: 20 BRPM | SYSTOLIC BLOOD PRESSURE: 114 MMHG

## 2021-09-01 DIAGNOSIS — D50.8 OTHER IRON DEFICIENCY ANEMIA: ICD-10-CM

## 2021-09-01 DIAGNOSIS — I50.9 CHF, STAGE C (HCC): ICD-10-CM

## 2021-09-01 DIAGNOSIS — I25.10 ATHEROSCLEROSIS OF NATIVE CORONARY ARTERY OF NATIVE HEART WITHOUT ANGINA PECTORIS: ICD-10-CM

## 2021-09-01 DIAGNOSIS — E11.51 TYPE 2 DIABETES, CONTROLLED, WITH PERIPHERAL CIRCULATORY DISORDER (HCC): Primary | ICD-10-CM

## 2021-09-01 DIAGNOSIS — E78.2 MIXED HYPERLIPIDEMIA: ICD-10-CM

## 2021-09-01 DIAGNOSIS — R91.8 LUNG MASS: ICD-10-CM

## 2021-09-01 PROCEDURE — 3017F COLORECTAL CA SCREEN DOC REV: CPT | Performed by: INTERNAL MEDICINE

## 2021-09-01 PROCEDURE — 2022F DILAT RTA XM EVC RTNOPTHY: CPT | Performed by: INTERNAL MEDICINE

## 2021-09-01 PROCEDURE — 99214 OFFICE O/P EST MOD 30 MIN: CPT | Performed by: INTERNAL MEDICINE

## 2021-09-01 PROCEDURE — G8417 CALC BMI ABV UP PARAM F/U: HCPCS | Performed by: INTERNAL MEDICINE

## 2021-09-01 PROCEDURE — G8427 DOCREV CUR MEDS BY ELIG CLIN: HCPCS | Performed by: INTERNAL MEDICINE

## 2021-09-01 PROCEDURE — 1101F PT FALLS ASSESS-DOCD LE1/YR: CPT | Performed by: INTERNAL MEDICINE

## 2021-09-01 PROCEDURE — G8536 NO DOC ELDER MAL SCRN: HCPCS | Performed by: INTERNAL MEDICINE

## 2021-09-01 PROCEDURE — G8510 SCR DEP NEG, NO PLAN REQD: HCPCS | Performed by: INTERNAL MEDICINE

## 2021-09-01 NOTE — PROGRESS NOTES
HISTORY OF PRESENT ILLNESS  Shira Leone is a 77 y.o. male. HPI  Assessment: Dorene Key is seen today with his wife for follow up of diabetes and other medical problems. 1) Diabetes. He is due for an A1c.   2) Hyperlipidemia. Liver functions are stable. We will go ahead and check a lipid panel to assure stability. 3) CHF. Severe. His AICD is out in preparation for radiation therapy. See below. He has a Life Vest on. 4) Lung cancer. Radiation therapy is pending. Apparently it is going to be five sessions only and he will then have the AICD replaced. Review of Systems   Constitutional: Negative for chills and fever. Respiratory: Positive for shortness of breath. Cardiovascular: Negative for chest pain. Physical Exam  Vitals and nursing note reviewed. Constitutional:       General: He is not in acute distress. Cardiovascular:      Rate and Rhythm: Normal rate and regular rhythm. Heart sounds: No murmur heard. No friction rub. No gallop. Pulmonary:      Effort: Pulmonary effort is normal.      Breath sounds: Normal breath sounds. Musculoskeletal:      Right lower leg: No edema. Left lower leg: No edema. ASSESSMENT and PLAN  Diagnoses and all orders for this visit:    1. Type 2 diabetes, controlled, with peripheral circulatory disorder (HCC)  -     HEMOGLOBIN A1C WITH EAG; Future    2. Mixed hyperlipidemia  -     LIPID PANEL; Future    3. CHF, stage C ([address]    4. Atherosclerosis of native coronary artery of native heart without angina pectoris    5. Lung mass    6.  Other iron deficiency anemia

## 2021-09-03 ENCOUNTER — APPOINTMENT (OUTPATIENT)
Dept: CARDIAC REHAB | Age: 76
End: 2021-09-03

## 2021-09-08 ENCOUNTER — TELEPHONE (OUTPATIENT)
Dept: CARDIAC REHAB | Age: 76
End: 2021-09-08

## 2021-09-08 ENCOUNTER — APPOINTMENT (OUTPATIENT)
Dept: CARDIAC REHAB | Age: 76
End: 2021-09-08

## 2021-09-09 ENCOUNTER — TELEPHONE (OUTPATIENT)
Dept: CARDIOLOGY CLINIC | Age: 76
End: 2021-09-09

## 2021-09-09 NOTE — TELEPHONE ENCOUNTER
Called and spoke with patient who had his first radiation treatment today. Patient is scheduled for treatments on the 13th, 15th, 17th and 20th. Verified with patient's wife next appointment with [facility] on 9/16/21.

## 2021-09-10 ENCOUNTER — APPOINTMENT (OUTPATIENT)
Dept: CARDIAC REHAB | Age: 76
End: 2021-09-10

## 2021-09-13 ENCOUNTER — APPOINTMENT (OUTPATIENT)
Dept: CARDIAC REHAB | Age: 76
End: 2021-09-13

## 2021-09-15 ENCOUNTER — APPOINTMENT (OUTPATIENT)
Dept: CARDIAC REHAB | Age: 76
End: 2021-09-15

## 2021-09-15 ENCOUNTER — TELEPHONE (OUTPATIENT)
Dept: CARDIOLOGY CLINIC | Age: 76
End: 2021-09-15

## 2021-09-15 NOTE — TELEPHONE ENCOUNTER
Telephone Call RE:  Appointment reminder     Outcome:     [] Patient confirmed appointment   [] Patient rescheduled appointment for    [] Unable to reach   [x] Left message              [] Other:     Informed patient of visitor restrictions. Requested a call back if exposure/covid 19 symptoms.     Bonnie Lucero

## 2021-09-16 ENCOUNTER — OFFICE VISIT (OUTPATIENT)
Dept: CARDIOLOGY CLINIC | Age: 76
End: 2021-09-16
Payer: MEDICARE

## 2021-09-16 ENCOUNTER — TELEPHONE (OUTPATIENT)
Dept: CARDIOLOGY CLINIC | Age: 76
End: 2021-09-16

## 2021-09-16 ENCOUNTER — DOCUMENTATION ONLY (OUTPATIENT)
Dept: CARDIOLOGY CLINIC | Age: 76
End: 2021-09-16

## 2021-09-16 VITALS
TEMPERATURE: 96.2 F | SYSTOLIC BLOOD PRESSURE: 106 MMHG | OXYGEN SATURATION: 98 % | DIASTOLIC BLOOD PRESSURE: 64 MMHG | RESPIRATION RATE: 16 BRPM | HEART RATE: 87 BPM | BODY MASS INDEX: 25.23 KG/M2 | WEIGHT: 190.4 LBS | HEIGHT: 73 IN

## 2021-09-16 DIAGNOSIS — Z79.899 RECEIVING INOTROPIC MEDICATION: Primary | ICD-10-CM

## 2021-09-16 DIAGNOSIS — I50.22 SYSTOLIC CHF, CHRONIC (HCC): ICD-10-CM

## 2021-09-16 PROCEDURE — G8417 CALC BMI ABV UP PARAM F/U: HCPCS | Performed by: NURSE PRACTITIONER

## 2021-09-16 PROCEDURE — 99214 OFFICE O/P EST MOD 30 MIN: CPT | Performed by: NURSE PRACTITIONER

## 2021-09-16 PROCEDURE — 1101F PT FALLS ASSESS-DOCD LE1/YR: CPT | Performed by: NURSE PRACTITIONER

## 2021-09-16 PROCEDURE — 3017F COLORECTAL CA SCREEN DOC REV: CPT | Performed by: NURSE PRACTITIONER

## 2021-09-16 PROCEDURE — G8428 CUR MEDS NOT DOCUMENT: HCPCS | Performed by: NURSE PRACTITIONER

## 2021-09-16 PROCEDURE — G8536 NO DOC ELDER MAL SCRN: HCPCS | Performed by: NURSE PRACTITIONER

## 2021-09-16 PROCEDURE — G8432 DEP SCR NOT DOC, RNG: HCPCS | Performed by: NURSE PRACTITIONER

## 2021-09-16 RX ORDER — BENZONATATE 200 MG/1
CAPSULE ORAL
Qty: 30 CAPSULE | Refills: 2 | Status: SHIPPED | OUTPATIENT
Start: 2021-09-16 | End: 2022-02-05

## 2021-09-16 NOTE — PATIENT INSTRUCTIONS
Medication changes:    No medication changes. Please take this to your pharmacy to notify them of the change in medications. Testing Ordered:    None. Other Recommendations:      Please contact [facility] about further LVAD work up when your decision has been made. Ensure your drinking an adequate amount of water with a goal of 6-8 eight ounce glasses (1.5-2 liters) of fluid daily. Your urine should be clear and light yellow straw colored. If your blood pressure begins to consistently run below 90/60 and/or you begin to experience dizziness or lightheadedness, please contact the [address] at [phone]. Follow up 4 weeks with [place] [facility] [place]      Please monitor your weights daily upon waking and after using the bathroom. Keep a written records of your weights and bring to your next appointment. If you have a weight gain of 3 or more pounds overnight OR 5 or more pounds in one week please contact our office. Thank you for allowing us the privilege of being a part of your healthcare team! Please do not hesitate to contact our office at [phone] with any questions or concerns. Virtual Heart Failure [address] invites you to learn more about heart failure and to share your questions, ideas, and experiences with others. Each month, the Heart Failure Support Group features a new educational topic and a guest speaker, followed by an interactive discussion. Our Heart Failure Nurse Navigator will moderate each session. You will be able to participate by phone, tablet or computer through [employer]. This support group takes place on the 3rd Thursday of each month from 6:00-7:30PM. All individuals living with heart failure and their caregivers are welcome to join. If you are interested in participating, please contact us at [email or web address] and you will be sent the link to join the [employer].

## 2021-09-16 NOTE — PROGRESS NOTES
MIKI met with patient and his spouse, Luz Degroot in the [facility] to complete an LVAD assessment. Evaluation was completed and a full note will follow. Patient and spouse were given the LVAD assessment documentation to sign and complete and return to SW at next office visit or admission. Patient is to contact [facility] on 9/20/21, after last chemo treatment to update on his decision to move forward with LVAD work up and implantation or not. MIKI will continue to follow.

## 2021-09-16 NOTE — TELEPHONE ENCOUNTER
Called [employer] and spoke with pharmacist Vandana Wagoner. Advised him per Dia Rodriguez NP to adjust current dosage weight for milrinone to 86.4 kg. He stated understanding and had no further questions.  Apryl Hill RN

## 2021-09-16 NOTE — PROGRESS NOTES
[facility] [place], [address] [place] [address] Note    Patient name: Sarah Handy  Patient : 1945  Patient MRN: [id number]  Date of service: 21    Primary care physician: Wicho Velez MD  Primary general cardiologist:  Dr. Nataliia Wilson    Primary [facility] cardiologist: Andreea Chaidez MD    Chief Complaint   Patient presents with    CHF        PLAN OF CARE:  · Severe ischemic cardiomyopathy, LVEF 20-25%; stage D, NYHA class IV symptoms; patient unerwent LVAD-DT evaluation; lung nodule biopsy was positive for adenocarcinoma, patient is now undergoing the final sessions of radiation  · Remainder of LVAD workup will be placed on hold including EGD/C-scope until patient wants to move forward  · I discussed with the patient and wife that if any other life-limiting condition is identified that would affect his 2-years survival this would be considered contraindication to LVAD implantation  · Meanwhile, we continue chronic palliative infusion of milrinone, and working on optimizing nutritional status and muscle conditioning     PLAN:  Continue current medical therapy for heart failure  Continue milrinone 0.3mcg/kg/min dosed to 86kg  Discontinued coreg due to RV dysfunction and in ancitipation of surgery  Discontinued ACEi/ARB/ARNi in anticipation of cardiac surgery  Consider resuming entresto if not candidate for LVAD   Continue current dose of eplerenone 50mg daily  Continue Farxiga 10mg daily   Continue bumex 1mg daily  Continue baby ASA   Consider resuming effient if considered not candidate for LVAD (washout prior to surgery)  Continue current dose of statin  Continue ranolazine (for HR and angina control) and imdur 30mg daily  Continue CPAP therapy  Routine HF labs monthly  Cont Cardiac Rehab   Advanced care plan forms on file  Referral to nutritionist for supplements with diabetes  Genetic test negative   S/p AICD removal for radiation therapy  Follow up with Pulmonary  Follow up with hematology  Follow up with Radiologist  Return to [address] in 4 weeks with NP/MD   Consider Melatonin to help with sleep   Repeat 6 min walk at next visit.      IMPRESSION:  Fatigue at rest  Shortness of breath with minimal exertion  Volume overload  Acute on chronic systolic heart failure  · Stage D, NYHA class IV symptoms improved to class II on inotropes  · Non-ischemic cardiomyopathy, LVEF 20% with LVEDD 6.2  · RV dysfunction, TAPSE 1.22 (prelimnary read)  · TBili 1.5 likely from cardiac congestion  At risk of sudden cardiac death  · Recent cardiac arrest   · S/p ICD (1/2013, [employer] followed by [facility])  Coronary artery disease  · S/p multiple interventions  · S/p 4V CABG (8/2012)  · LHC (7/2019) severe stenosis of LIMA to LAD anastomosis site, SVG graft to OM is occluded, SVG graft to RCA 40-50%, LAD occluded proximally, severe proximal LCx, RCA is the long . · PET (6/2019) EF 24% with anterior lateral and inferior reversible defect  Cardiac risk factors  · HTN  · HL  · DM2  · SRUTHI on CPAP  · MIld carotid stenosis  Anemia, microcytic  · Iron deficiency  DVT with filter  Pulmonary nodules   Dysphagia      CARDIAC IMAGING:  Echo (5/20/21)  · LV: Estimated LVEF is 20 - 25%. Normal wall thickness. Mildly dilated left ventricle. Severely and globally reduced systolic function. Severe (grade 4) left ventricular diastolic dysfunction. · LA: Severely dilated left atrium. · RA: Severely dilated right atrium. · MV: Moderate mitral valve regurgitation is present. · TV: Moderate tricuspid valve regurgitation is present. · AO: Mild aortic root dilatation. · RV: Pacer/ICD present. · LVED 6.2cm  EKG (5/20/21) SB with 1degree AV block, QRS 116ms     [facility] 5/24/21 Native Coronaries: LM - moderate to severe distal disease 50%. % prox occluded (), heavily calcified, LCx: 80% proximal stenosis. OM1 is  (seen filling faintly via collaterals).  OM2 99% stenosis  RCA: 100% proximal occluded.   LIMA to LAD: patent, supplies only a diagonal branch (probably D2). The LAD distal to the bifurcation is 100% occluded () and fills via right to left collaterals off the SVG to   RCA.   SVG to R-PDA: patent with moderate diffuse irregularities but no obstructive disease in the graft.    No appealing interventional targets.      NST as above     ICD interrogation (5/12/21) [employer] single lead, no events, normal device function and good battery     HEMODYNAMICS:  RHC 5/24/21 CI 1.97, PA 33/9/17, RA 1, PCWP 6- off milrinone   CPEST not done      6 Min Walk Report 7/6/2021 5/20/2021   (PRE) HR 98 74   (PRE) O2 Sat - 99   (POST) HR - 81   (POST) O2 Sat 98% on Ra 99   Distance in Meters - 1158.24          OTHER IMAGING:  CXR (6/17/21) clear  CT 5/21/21 1. Irregular pulmonary nodule in the left upper lobe measuring 2 cm, suspicious for primary pulmonary malignancy. 2. Additional 6 mm left upper lobe pulmonary nodule. 3. Severe calcific atherosclerosis of the coronary arteries and abdominal aorta. No aneurysm. 4. Cardiomegaly. 5. No acute abnormality within the chest, abdomen, or pelvis.     HISTORY OF PRESENT ILLNESS:  I had the pleasure of seeing Heron Oconnell Sr in [facility] at [address] [place] [address] in [address] [place] [address] a 77 y. o. male with h/o HTN, HL, DM2, SRUTHI on CPAP, chronic systolic heart failure, stage D, NYHA class IV symptoms, non-ischemic cardiomyopathy, LVEF 20% with LVEDD 6.2, RV dysfunciton, TAPSE 1.22, TBili 1.5 likely from cardiac congestion, recent cardiac arrest s/p ICD (1/2013, [facility] followed by [address] [place] [address]), coronary artery disease s/p multiple interventions s/p 4V CABG (8/2012), LHC (7/2019) severe stenosis of LIMA to LAD anastomosis site, SVG graft to OM is occluded, SVG graft to RCA 40-50%, LAD occluded proximally, severe proximal LCx, RCA is the long . PET (6/2019) EF 24% with anterior lateral and inferior reversible defect. Anemia, microcytic with iron deficiency, DVT with filter.     Patient was referred to A[facility] Clinic by Dr. Melinda Crowley for evaluation of his candidacy for advanced therapies.     Patient agreed to inpatient initiation of palliative infusion of chronic inotropes and evaluation for LVAD-DT. Patient was admitted 5/2-5/25/21. Patient was started on milrinone infusion and had first part of LVAD evaluation completed. Right and left heart cath on 5/24/21 that showed severe diffuse native vessel CAD, with patent grafts (LIMA to D2, SVG to RCA). Antiplatelet therapy was held during hospitalization and after discharge due to anticipated pulmonary nodule biopsy, bone marrow biopsy and EGD/C-Scope as part of LVAD workup.     Patient was readmitted 5/26-5/28/21 with hypertension, headache and chest pain, his troponin peaked at 10; he was shortly bridged with heparin, otherwise had normal EKG and chest CT negative for PE. Cardiology and AHF service was consulted and patient was discharged home after troponin came down.      INTERVAL HISTORY:  Today, patient presents for HF clinic visit. He states he feels well overall, his can do his normal activities, denies issues with his PICC line and milrinone infusion. He is wearing his lifevest and has 2 more radiation sessions to go for his lung tumor. He is compliant with his medications, he has not been participating in cardiac rehab due to the weight of the lifevest and inotrope pump but he states his appetite has improved. REVIEW OF SYSTEMS:  General: Denies fever, night sweats. Ear, nose and throat: Denies difficulty hearing, sinus problems, runny nose, post-nasal drip, ringing in ears, mouth sores, loose teeth, ear pain, nosebleeds, sore throate, facial pain or numbess  Cardiovascular: see above in the interval history  Respiratory: Denies cough, wheezing, sputum production, hemoptysis.   Gastrointestinal: Denies heartburn, constipation, diarrhea, abdominal pain, nausea, vomiting, difficulty swallowing, blood in stool  Kidney and bladder: Denies painful urination, frequent urination, urgency  Musculoskeletal: Denies joint pain, muscle weakness  Skin and hair: Denies change in existing skin lesions, hair loss or increase, breast changes    PHYSICAL EXAM:  Visit Vitals  /64 (BP 1 Location: Left arm, BP Patient Position: Sitting, BP Cuff Size: Adult)   Pulse 87   Temp (!) 96.2 °F (35.7 °C) (Oral)   Resp 16   Ht 6' 1\" (1.854 m)   Wt 190 lb 6.4 oz (86.4 kg)   SpO2 98%   BMI 25.12 kg/m²     General: Patient is well developed, well-nourished in no acute distress  HEENT: Normocephalic and atraumatic. No scleral icterus. Pupils are equal, round and reactive to light and accomodation. No conjunctival injection. Oropharynx is clear. Neck: Supple. No evidence of thyroid enlargements or lymphadenopathy. JVD: Cannot be appreciated   Lungs: Breath sounds are equal and clear bilaterally. No wheezes, rhonchi, or rales. Heart: Regular rate and rhythm with normal S1 and S2. No murmurs, gallops or rubs. Abdomen: Soft, no mass or tenderness. No organomegaly or hernia. Bowel sounds present. Genitourinary and rectal: deferred  Extremities: No cyanosis, clubbing, or edema. Neurologic: No focal sensory or motor deficits are noted. Grossly intact. Psychiatric: Awake, alert an doriented x 3. Appropriate mood and affect. Skin: Warm, dry and well perfused. No lesions, nodules or rashes are noted.     PAST MEDICAL HISTORY:  Past Medical History:   Diagnosis Date    CAD (coronary artery disease) '90 '97, '13 x 2    [place], [place] in [place] [facility]    Calculus of kidney     Colonic polyps     Congestive heart failure, unspecified     Diabetes ([facility] [address]     Gastritis     Hypercholesterolemia     Sleep apnea        PAST SURGICAL HISTORY:  Past Surgical History:   Procedure Laterality Date    COLONOSCOPY  04/06/2011    17, due 21    ENDOSCOPY, COLON, DIAGNOSTIC 11, due 16    HX CORONARY ARTERY BYPASS GRAFT  8/22/12    x 4 vessel by MISSY Ramirez    HX HEENT      LASIK    HX PACEMAKER PLACEMENT  1/30/13    NE CARDIAC SURG PROCEDURE UNLIST  2012    x 4 vessels       FAMILY HISTORY:  Family History   Problem Relation Age of Onset    Heart Disease Mother         MI    Heart Disease Father         CAD & PVD    Lung Disease Father     Cancer Father         lung    Diabetes Maternal Grandmother     Heart Disease Other         CAD    Stroke Sister        SOCIAL HISTORY:  Social History     Socioeconomic History    Marital status:      Spouse name: Not on file    Number of children: Not on file    Years of education: Not on file    Highest education level: Not on file   Tobacco Use    Smoking status: Never Smoker    Smokeless tobacco: Never Used   Substance and Sexual Activity    Alcohol use: Yes     Alcohol/week: 0.0 standard drinks     Comment:  VERY RARE    Drug use: No     Social Determinants of Health     Financial Resource Strain:     Difficulty of Paying Living Expenses:    Food Insecurity:     Worried About Running Out of Food in the Last Year:     [address] in the Last Year:    Transportation Needs:     Lack of Transportation (Medical):  Lack of Transportation (Non-Medical):    Physical Activity:     Days of Exercise per Week:     Minutes of Exercise per Session:    Stress:     Feeling of Stress :    Social Connections:     Frequency of Communication with Friends and Family:     Frequency of Social Gatherings with Friends and Family:     Attends [employer] Services:     Active Member of Clubs or Organizations:     Attends Club or Organization Meetings:     Marital Status:        LABORATORY RESULTS:  No flowsheet data found.     ALLERGY:  Allergies   Allergen Reactions    Oxycodone Anaphylaxis    Pcn [Penicillins] Hives        CURRENT MEDICATIONS:    Current Outpatient Medications:     dapagliflozin (Farxiga) 10 mg tab tablet, Take 1 Tablet by mouth., Disp: , Rfl:     ergocalciferol (ERGOCALCIFEROL) 1,250 mcg (50,000 unit) capsule, TAKE 1 CAPSULE BY MOUTH EVERY 7 DAYS FOR 11 DOSES, Disp: 11 Capsule, Rfl: 0    benzonatate (TESSALON) 200 mg capsule, TAKE 1 CAPSULE BY MOUTH THREE TIMES DAILY AS NEEDED FOR COUGH, Disp: 30 Capsule, Rfl: 2    glucose blood VI test strips (OneTouch Ultra Test) strip, USE TO TEST BLOOD SUGAR DAILY, Disp: 100 Strip, Rfl: 3    OneTouch Delica Plus Lancet 33 gauge misc, USE TO TEST BLOOD SUGAR DAILY, Disp: 100 Lancet, Rfl: 3    prasugreL (Effient) 10 mg tablet, Take 1 Tablet by mouth daily. Per cardiology (Patient not taking: Reported on 7/13/2021), Disp: 90 Tablet, Rfl: 3    omeprazole (PRILOSEC) 20 mg capsule, TAKE 1 CAPSULE BY MOUTH DAILY FOR INDIGESTION, Disp: 30 Capsule, Rfl: 1    ranolazine ER (RANEXA) 1,000 mg, Take 500 mg by mouth two (2) times a day., Disp: , Rfl:     metFORMIN (GLUCOPHAGE) 500 mg tablet, Take 500 mg by mouth two (2) times daily (with meals). , Disp: , Rfl:     eplerenone (INSPRA) 50 mg tab tablet, Take 1 Tablet by mouth daily. , Disp: 90 Tablet, Rfl: 1    isosorbide mononitrate ER (IMDUR) 30 mg tablet, Take 1 Tablet by mouth every twelve (12) hours. , Disp: 180 Tablet, Rfl: 1    hydrALAZINE (APRESOLINE) 50 mg tablet, Take 1 Tablet by mouth three (3) times daily. , Disp: 180 Tablet, Rfl: 2    magnesium oxide (MAG-OX) 400 mg tablet, Take 1 Tablet by mouth daily. (Patient not taking: Reported on 9/1/2021), Disp: 90 Tablet, Rfl: 1    milrinone (PRIMACOR) 20 mg/100 mL (200 mcg/mL) infusion, 26.58 mcg/min by IntraVENous route continuous. (Patient taking differently: 0.3 mcg/kg/min by IntraVENous route continuous. Based on weight of 88.6 kg), Disp: 100 mL, Rfl: 0    potassium chloride SR (K-TAB) 20 mEq tablet, Take 2 Tablets by mouth two (2) times a day.  (Patient not taking: Reported on 8/19/2021), Disp: 240 Tablet, Rfl: 1    ondansetron hcl (ZOFRAN) 4 mg tablet, Take 1 Tab by mouth every eight (8) hours as needed for Nausea, Vomiting or Nausea or Vomiting., Disp: 20 Tab, Rfl: 1    rosuvastatin (CRESTOR) 10 mg tablet, TAKE 1 TABLET NIGHTLY, Disp: 90 Tab, Rfl: 3    tamsulosin (FLOMAX) 0.4 mg capsule, TAKE 1 CAPSULE DAILY, Disp: 90 Cap, Rfl: 3    lancets misc, Use to test blood sugar daily, Disp: 100 Each, Rfl: 11    bumetanide (BUMEX) 2 mg tablet, Take  by mouth. 1/2 tab every other day., Disp: , Rfl: 0    acetaminophen (TYLENOL EXTRA STRENGTH) 500 mg tablet, Take  by mouth every six (6) hours as needed. , Disp: , Rfl:     aspirin delayed-release 81 mg tablet, Take 81 mg by mouth daily. , Disp: , Rfl:     Thank you for your referral and allowing me to participate in this patient's care.     Manuel Carrera NP  [facility] [address], [address]  Phone: [phone]    PATIENT CARE TEAM:  Patient Care Team:  Rosales Cunningham MD as PCP - General (Internal Medicine)  Rosales Cunningham MD as PCP - [address] [place] [address] Provider  London Soto MD as Physician (Cardiology)  Bud Mcmahon MD as Physician (Cardiology)  Arty Lombard., MD as Physician (Gastroenterology)  Jared Foster MD as Physician (Orthopedic Surgery)  Krystyna Casas MD as Physician (Ophthalmology)  Ruven Alstrom Wilms, MD as Physician ([address])  Domonique Odom MD (Cardiology)  Poornima Upton MD (Cardiology)     Total visit time: 40 minutes (> 50% spent face-to-face counseling)

## 2021-09-17 ENCOUNTER — DOCUMENTATION ONLY (OUTPATIENT)
Dept: CARDIOLOGY CLINIC | Age: 76
End: 2021-09-17

## 2021-09-17 ENCOUNTER — APPOINTMENT (OUTPATIENT)
Dept: CARDIAC REHAB | Age: 76
End: 2021-09-17

## 2021-09-17 NOTE — PROGRESS NOTES
[facility]  Social Work LVAD/Heart Transplant/ Heart Failure Evaluation      Date: 2021                                                                       NAME: Elo García Sr   : 1945   ADDRESS: [address]  [place] [address]   PHONE NUMBERS:  [phone]    Advent:Yazdanism  COUNTRY OF BIRTH: [facility]  CITIZENSHIP:[place] citizen  MARITAL STATUS:        PRIMARYINSURANCE: Medicare [place] with prescription coverage  SECONDARY INSURANCE:N/A   STATUS: inactive-[employer]     Family Information:   Where were you born? :[place]  Who raised you? mother  Where were you raised? [place], [place]  Does your family have a HX of heart problems? yes  Are your parents living or ?      Do you have any siblings? Yes, 2 older sisters, Kt Fuentes and Scott Rodriguez  Do you have a good relationship with your siblings? Yes  Will they be able to offer support before, during, and after LVAD/Transplant surgery? No    Do you live with anyone? Yes, spouse, Romario Square  If so, are the people you live with in good health? Yes                        Are you involved in a significant relationship? (If not ) N/A    Do you have any children? Yes, 2 daughters Brenda Diane and Nathan gonzalez and 1 son Holland Garza. Do you have a good relationship with your children? Yes   Will they be able to offer support before, during, and after LVAD/Transplant surgery? Rashad Kincaid will be able to offer support    Do you have any pets? no  Are you currently a caregiver? no    Educational History:   What is the highest level of education you have obtained? high school diploma/ged  Do you have any problems with reading or writing? no  How do you obtain information best? tactile and combination  Financial/Work History:   Are you employed? No    Are you or do you plan on using STD/LTD/FMLA? No  What is your source of income?  SSA, SSD and longterm  Local  How do you plan to cover your expenses while off work? N/A  Have you applied for disability and Medicaid? Yes  Do you have difficulty meeting your current monthly expenses? No  Do you currently have any financial concerns? no    Budget: $3000 + per month and spouse's long-term income     Mortgage/Rent:0 Electricity:0 Gas/Water:0   Phone: 0 Cell Phone:  0 Cable:  0   Credit Cards:  0 Medical Bills: 0 Car Note:  0    Care insurance:  0 Food: 0 Other:  0     Pharmacy / Medication History:   Where do you fill your medications? [facility] if mail order, [facility] [address] and [place] and/or the [employer] and phone number [address] [facility] [address]   Are you compliant with your medications? yes    Do you have any difficulties in obtaining or taking your medications? no   Dentist name and number? Has been following with the [place] for dental care  PCP name and number? Huong Rodriguez  [phone]  Substance Abuse History:   Do you smoke? No  Does anyone else in your household smoke? no  Do you drink? No  Does anyone else in your household drink? Spouse, very rarely  Do you use illegal drugs? No  Does anyone else in your household use illegal drugs? no  Have you even been involved in a drug or alcohol treatment program? no    Psychiatric History:   Have you ever been under the care of a mental health professional? no  Have you ever been hospitalized for psychiatric reasons? no  Current/Past suicidal ideations?: No  Current/Past homicidal ideations?: No  Are you currently on any medications for mental health issues? no  Is there history of mental illness in your family? no  Have you ever left the hospital AMA? no  Do you have a mental health history? No      Mental Status Exam:   1. Presenting problem has affected:  Health    2. Client manner of dress:   Casual    3. Patient Hygiene:  Excellent    4. Level of Responsiveness: Alert and oriented to all spheres  5. Client motor behavior: Normal    6. Evaluation of client level of distress:  none    7. Signs of client distress during interview:  None    8. Affect:   Appropriate    9. Thought Process:  Logical    10. Thought Content / Preoccupations: No evidence of impairment    11. Unusual Perceptual Experiences:  none    12. Attention / concentration:  intact    13. Orientation for:  Oriented in all spheres    14. Memory Functions:  Intact    15. Insight (as age appropriate):  fair    12.  Judgement (as age appropriate):    good   Legal Concerns:   Are you currently or have you ever been on parole, probation, or involved in litigation? no  Have you had any substance related legal problems? no  Have you ever been incarcerated? no  Do you have a valid s license? yes    Lifestyle/Functional Ability / Personal Care:   What is your diagnosis and when were you diagnosed? CHF  Has your illness affected your life? Can't drive   What are your daily activities? Daily self care, routine appointments, etc.  How do you handle stressful situations? \"I don't worry\" it isn't useful What are your coping mechanisms? Patient denies focusing on stressful situations, therefore no coping mechanisms  What is your living situation? single family home  Do you use any DME? no none  Are you open to home health or personal care? yes  Transportation- Do you drive? Can drive, but hasn't because he now has a LifeVest and on inotrope   Household/personal tasks- Are you independent in cooking, cleaning/laundry, yardwork, shopping, dressing, and bathing? yes  Power company name and account number? [employer]  Support System:   Who will be your primary support person before, during, and after your LVAD/Transplant surgery? Spouse, Page Cheryl ()  Will anyone else be providing assistance and support?  Yes, Sydney Dahl (), Amaris Ennis () and Pipe Mccloud ()  Who will bring you to clinic appointments before and after LVAD implementation? spouse  Do they have a reliable automobile? yes  Do they have a valid s license? yes  Are you active in community agencies, [employer], [employer], or any other fede based community? yes  Who do you usually count on for emotional support? \"not really anyone\" spouse reported she will contact  for patient and then patient will speak with   Have you read material about the LVAD/Heart transplant surgery? no  Are you interested in meeting someone with a LVAD/Heart transplant? Not at this time, maybe in future  How does your spouse, significant other, family feel about LVAD/Transplant? \"if he wants it, I'm there\"    Concerns and Questions:   Do you have any fears or concerns regarding LVAD/Transplant surgery? No fear, just not wanting to go through recovery process  How do you think you will prepare yourself for the LVAD/Transplant surgery? Needs more information about the LVAD and may meet with someone with an LVAD implant  Do you believe that you understand what challenges and changes you will experience with LVAD/Transplant surgery? Yes, specifically about going through the surgery and having to wear batterries    Do you have a living will or an advance directive? Yes    Impressions/Barriers:   No obvious barriers, patient seems to have ample social/caregiver support, financial resources, insurance and housing    SW met with Barbara Fagan and his spouse, Lakesha Dickson () in the [facility] to complete this LVAD assessment for candidacy. Both Travis Mili and Zoraida Orozco spoke openly during conversation. Travis Garces has a good relationship with his three children, Walt Seip, who resides in [place] () and Zara Sharan, live locally. Heron's son, lives in a group home, he is a non-verbal autistic person. Travis Garces is an [employer] .  He has insurance coverage with Medicare/United Health Care and veterans benefits through the [facility]. Carlin Salazar stated he has SSD benefits and a small long-term through [facility] [address] and Merry Colbert denied financial concerns. Carlin Salazar endorses medication compliance and no difficulty obtaining his medications. Carlin Salazar denies any history of tobacco, alcohol or substance use/abuse. Carlin Salazar shared he has never been under the care of a mental health professional or been hospitalized for psychiatric reasons. Carlin Salazar denies suicidal/homicidal ideations. Carlin Salazar denies a history of mental illness, incarceration, or substance related legal issues. Heron's caregivers pre/post LVAD implantation will be Merry Colbert and Jordan. Merry Colbert verbalized her participation and although Griselda Shin was not present to confirm her participation she is already actively participating in [place] care and assisting with patient's home inotrope care and attends provider's updates via telephone. Carlin Salazar and Merry Colbert have also named their granddaughter, Joshua Hunt () and their niece Jessica Smith () as additional caregivers. Patient believes he understands what challenges and changes he will experience with LVAD implantation and shared he has concerns about having to carry batteries and the actual surgery and recovery. Carlin Salazar shared, at this time, he does not want to meet with someone who has an LVAD. Merry Colbert stated, if Carlin Salazar chooses to proceed with LVAD implantation she will be supportive and an active caregiver, Shabana Torres he wants it, I'm there\". Merry Colbert and Carlin Salazar have completed an AMD and it has been scanned into the patient's medical record. Currently, Adrien Alvarado is a good candidate for LVAD implantation with no identified risk factors. Thank you for the opportunity to meet with Adrien Alvarado for LVAD candidacy determination.     MICHAEL Lundy

## 2021-09-20 ENCOUNTER — TELEPHONE (OUTPATIENT)
Dept: CARDIOLOGY CLINIC | Age: 76
End: 2021-09-20

## 2021-09-20 ENCOUNTER — APPOINTMENT (OUTPATIENT)
Dept: CARDIAC REHAB | Age: 76
End: 2021-09-20

## 2021-09-20 NOTE — TELEPHONE ENCOUNTER
Patient and patient's wife called to discuss decision to continue LVAD workup or to discontinue workup. Patient states he is hesitant to make a decision and feels he does not have enough information. Reviewed the changes in lifestyle associated with LVAD implant. Will [employer] additional LVAD education and DVD to patient. Will call patient on Friday for final decision.

## 2021-09-22 ENCOUNTER — APPOINTMENT (OUTPATIENT)
Dept: CARDIAC REHAB | Age: 76
End: 2021-09-22

## 2021-09-23 ENCOUNTER — TELEPHONE (OUTPATIENT)
Dept: CARDIOLOGY CLINIC | Age: 76
End: 2021-09-23

## 2021-09-23 NOTE — TELEPHONE ENCOUNTER
[facility] called regarding pt's plan of care orders saying they need to be signed off on so they can continue working with him.     CB is [phone]

## 2021-09-24 NOTE — TELEPHONE ENCOUNTER
Called and spoke with patient's wife. She states patient received his LVAD education and is continuing to review. Patient agreed to make a decision by Monday 9/24/21.

## 2021-09-24 NOTE — TELEPHONE ENCOUNTER
I called [facility] and spoke with Denita Paget. Advised her that we last faxed Certification and plan of care orders on 8/11/21 with fax confirmation. She requested that we fax it again. I will fax orders again.

## 2021-09-27 ENCOUNTER — TELEPHONE (OUTPATIENT)
Dept: CARDIOLOGY CLINIC | Age: 76
End: 2021-09-27

## 2021-09-27 NOTE — TELEPHONE ENCOUNTER
I called patient, reviewed all lab results. Patient states weight the past few days has been 186-187 lb. He denies shortness of breath, denies swelling, states he is taking bumex 1 mg every other day. I called patient, spoke with wife, advised her per ALICIA Wong:  Please have him take daily x 3 days then resume qOD dosing. She states understanding, will inform patient.

## 2021-09-27 NOTE — TELEPHONE ENCOUNTER
Labs reviewed from 9/21. Notable for elevated proBNP 3083 from 1900. Will inquire re: weight, symptoms.

## 2021-09-27 NOTE — TELEPHONE ENCOUNTER
Called and spoke with patient regarding his decision to continue or discontinue his LVAD workup. Reviewed patient's questions including how frequently patient would need dressing changes, if he would be able to travel, and what day to day life looks like for a LVAD patient. Patient states he has not made up his mind and will call back this week. Grant Roe NP made aware.

## 2021-09-28 ENCOUNTER — TELEPHONE (OUTPATIENT)
Dept: CARDIOLOGY CLINIC | Age: 76
End: 2021-09-28

## 2021-09-28 NOTE — TELEPHONE ENCOUNTER
----- Message from Alaina Rascon RN sent at 9/27/2021 12:20 PM EDT -----  I was speaking to Mr. Oconnell about his LVAD workup. His wife and daughter got on the phone. They said he has a rash around the PICC dressing. It sounds like the adhesive irritation. I instructed them to clean the area and apply skin prep, allow it to dry and put the adhesive dressing in place. Can you contact [facility] and ask them to order a sensitive skin dressing for him? Thanks      I called [facility], spoke with Supervisor, DOMI, she will order sensitive skin dressing and skin prep. I called patient and spoke with wife-notified them that these will be ordered.

## 2021-09-29 ENCOUNTER — APPOINTMENT (OUTPATIENT)
Dept: CARDIAC REHAB | Age: 76
End: 2021-09-29

## 2021-09-30 DIAGNOSIS — E11.51 TYPE 2 DIABETES, CONTROLLED, WITH PERIPHERAL CIRCULATORY DISORDER (HCC): ICD-10-CM

## 2021-09-30 DIAGNOSIS — E78.2 MIXED HYPERLIPIDEMIA: Primary | ICD-10-CM

## 2021-10-01 ENCOUNTER — TELEPHONE (OUTPATIENT)
Dept: CARDIOLOGY CLINIC | Age: 76
End: 2021-10-01

## 2021-10-01 NOTE — TELEPHONE ENCOUNTER
26 - Returned patient's wife call. Her message was that patient's rash around PICC line is better but is now red and swollen. Left a message requesting patient return call, asking  if he has s/s of infection and that patient may need to see MD. Marni Martinez a call back. 1700 - spoke with patient's wife. Mariel Vergara states overall PICC site looks better and patient shows no signs of infection. Mariel Vergara verbalized to call if site becomes worse.

## 2021-10-05 LAB
CHOLEST SERPL-MCNC: 144 MG/DL (ref 100–199)
EST. AVERAGE GLUCOSE BLD GHB EST-MCNC: 137 MG/DL
HBA1C MFR BLD: 6.4 % (ref 4.8–5.6)
HDLC SERPL-MCNC: 57 MG/DL
LDLC SERPL CALC-MCNC: 72 MG/DL (ref 0–99)
TRIGL SERPL-MCNC: 80 MG/DL (ref 0–149)
VLDLC SERPL CALC-MCNC: 15 MG/DL (ref 5–40)

## 2021-10-05 NOTE — TELEPHONE ENCOUNTER
Call -   1. Cholesterol excellent- Continue current regimen of prescription and / or OTC medications   2. Diabetes excellent- is he on or off metformin ? If off, when did he stop ?

## 2021-10-05 NOTE — TELEPHONE ENCOUNTER
Advised pt the followin. Cholesterol excellent. Continue current regimen of prescription and / or OTC medications. 2. Diabetes excellent. MD wanted to know is he on or off metformin? He is not taking it. If off, when did he stop? He states he stopped it about 2 weeks ago. Was not taking as ordered - he was taking 1 tab bid instead of 2 tabs bid.  Will forward to MD.

## 2021-10-13 ENCOUNTER — TELEPHONE (OUTPATIENT)
Dept: CARDIOLOGY CLINIC | Age: 76
End: 2021-10-13

## 2021-10-13 NOTE — TELEPHONE ENCOUNTER
Telephone Call RE:  Appointment reminder     Outcome:     [] Patient confirmed appointment   [] Patient rescheduled appointment for    [] Unable to reach   [x] Left message              [] Other:   Requested a call back if exposure/covdi 19 symptoms, informed of visitor restrictions.     Barnet Silence

## 2021-10-14 ENCOUNTER — OFFICE VISIT (OUTPATIENT)
Dept: CARDIOLOGY CLINIC | Age: 76
End: 2021-10-14
Payer: MEDICARE

## 2021-10-14 ENCOUNTER — TELEPHONE (OUTPATIENT)
Dept: CARDIOLOGY CLINIC | Age: 76
End: 2021-10-14

## 2021-10-14 VITALS
OXYGEN SATURATION: 98 % | HEIGHT: 73 IN | RESPIRATION RATE: 16 BRPM | HEART RATE: 86 BPM | SYSTOLIC BLOOD PRESSURE: 114 MMHG | BODY MASS INDEX: 24.55 KG/M2 | DIASTOLIC BLOOD PRESSURE: 62 MMHG | WEIGHT: 185.2 LBS | TEMPERATURE: 97.9 F

## 2021-10-14 DIAGNOSIS — Z79.01 CHRONIC ANTICOAGULATION: ICD-10-CM

## 2021-10-14 DIAGNOSIS — I50.22 SYSTOLIC CHF, CHRONIC (HCC): ICD-10-CM

## 2021-10-14 DIAGNOSIS — Z79.899 RECEIVING INOTROPIC MEDICATION: Primary | ICD-10-CM

## 2021-10-14 PROCEDURE — G8428 CUR MEDS NOT DOCUMENT: HCPCS | Performed by: NURSE PRACTITIONER

## 2021-10-14 PROCEDURE — G8420 CALC BMI NORM PARAMETERS: HCPCS | Performed by: NURSE PRACTITIONER

## 2021-10-14 PROCEDURE — G8432 DEP SCR NOT DOC, RNG: HCPCS | Performed by: NURSE PRACTITIONER

## 2021-10-14 PROCEDURE — G8536 NO DOC ELDER MAL SCRN: HCPCS | Performed by: NURSE PRACTITIONER

## 2021-10-14 PROCEDURE — 1101F PT FALLS ASSESS-DOCD LE1/YR: CPT | Performed by: NURSE PRACTITIONER

## 2021-10-14 PROCEDURE — 3017F COLORECTAL CA SCREEN DOC REV: CPT | Performed by: NURSE PRACTITIONER

## 2021-10-14 PROCEDURE — 99214 OFFICE O/P EST MOD 30 MIN: CPT | Performed by: NURSE PRACTITIONER

## 2021-10-14 RX ORDER — DIPHENHYDRAMINE HCL 25 MG
25 TABLET ORAL
COMMUNITY
End: 2022-05-06

## 2021-10-14 NOTE — TELEPHONE ENCOUNTER
Called [facility] and spoke with Elena Pappas. Advised him per Asuncion Narvaez NP to adjust milrinone dosing weight to 84kg. He stated understanding and had no further questions.  Airam Muller RN

## 2021-10-14 NOTE — PATIENT INSTRUCTIONS
Medication changes:    HH will be out to adjust milrinone to current weight    Please take this to your pharmacy to notify them of the change in medications. Testing Ordered: Other Recommendations: It is recommenced that you receive covid vaccine booster and flu shot     You may use cortisone cream and benadryl cream on the rash by your PICC line. Also keep your skin moisturized by using aquaphor. Please increase activity as tolerated     Increase healthy calorie consumption        Ensure your drinking an adequate amount of water with a goal of 6-8 eight ounce glasses (1.5-2 liters) of fluid daily. Your urine should be clear and light yellow straw colored. If your blood pressure begins to consistently run below 90/60 and/or you begin to experience dizziness or lightheadedness, please contact the [facility] at [phone]. Follow up 4 weeks with [facility]      Please monitor your weights daily upon waking and after using the bathroom. Keep a written records of your weights and bring to your next appointment. If you have a weight gain of 3 or more pounds overnight OR 5 or more pounds in one week please contact our office. Thank you for allowing us the privilege of being a part of your healthcare team! Please do not hesitate to contact our office at [phone] with any questions or concerns. Virtual Heart Failure [address] invites you to learn more about heart failure and to share your questions, ideas, and experiences with others. Each month, the Heart Failure Support Group features a new educational topic and a guest speaker, followed by an interactive discussion. Our Heart Failure Nurse Navigator will moderate each session. You will be able to participate by phone, tablet or computer through [address] [place] [address].  This support group takes place on the 3rd Thursday of each month from 6:00-7:30PM. All individuals living with heart failure and their caregivers are welcome to join. If you are interested in participating, please contact us at [email or web address] and you will be sent the link to join the [employer].

## 2021-10-14 NOTE — PROGRESS NOTES
[facility] in [place], [facility] Note    Patient name: Soto Kwong  Patient : 1945  Patient MRN: [id number]  Date of service: 10/14/21    Primary care physician: Paula Carranza MD  Primary general cardiologist:  Dr. Kun Hernández    Primary F cardiologist: Catracho Poole MD    Chief Complaint   Patient presents with    CHF        PLAN OF CARE:  · Severe ischemic cardiomyopathy, LVEF 20-25%; stage D, NYHA class IV symptoms; patient unerwent LVAD-DT evaluation; lung nodule biopsy was positive for adenocarcinoma, patient is now undergoing the final sessions of radiation  · Remainder of LVAD workup will be placed on hold including EGD/C-scope until patient wants to move forward  · Meanwhile, we continue chronic palliative infusion of milrinone, and working on optimizing nutritional status and muscle conditioning  · Plan for AICD reimplant on 10/21      PLAN:  Continue current medical therapy for heart failure  Continue milrinone 0.3mcg/kg/min- dose to new weight 84kg  Discontinued coreg due to RV dysfunction and in ancitipation of surgery  Discontinued ACEi/ARB/ARNi in anticipation of cardiac surgery  Consider resuming entresto if not candidate for LVAD   Continue current dose of eplerenone 50mg daily  Continue Farxiga 10mg daily   Continue current dose of Hydralazine 50 TID  Continue bumex 1mg daily  Continue baby ASA   Consider resuming effient if considered not candidate for LVAD (washout prior to surgery)  Continue current dose of statin  Continue ranolazine (for HR and angina control) and imdur 30mg daily  Continue CPAP therapy- has not been using   Routine HF labs monthly  Cont Cardiac Rehab   Advanced care plan forms on file  Referral to nutritionist for supplements with diabetes  Genetic test negative   S/p AICD removal for radiation therapy  Follow up with Pulmonary  Follow up with hematology  Follow up with Radiologist- will request records   Return to [address] [place] [address] in 4 weeks with NP/MD    Will set up a conversation with current LVAD patient and give patient decision tool to help with decision to pursue LVAD or not.      IMPRESSION:  Fatigue at rest  Shortness of breath with minimal exertion  Volume overload  Acute on chronic systolic heart failure  · Stage D, NYHA class IV symptoms improved to class II on inotropes  · Non-ischemic cardiomyopathy, LVEF 20% with LVEDD 6.2  · RV dysfunction, TAPSE 1.22 (prelimnary read)  · TBili 1.5 likely from cardiac congestion  At risk of sudden cardiac death  · Recent cardiac arrest   · S/p ICD (1/2013, [employer] followed by [facility])  Coronary artery disease  · S/p multiple interventions  · S/p 4V CABG (8/2012)  · LHC (7/2019) severe stenosis of LIMA to LAD anastomosis site, SVG graft to OM is occluded, SVG graft to RCA 40-50%, LAD occluded proximally, severe proximal LCx, RCA is the long . · PET (6/2019) EF 24% with anterior lateral and inferior reversible defect  Cardiac risk factors  · HTN  · HL  · DM2  · SRUTHI on CPAP  · MIld carotid stenosis  Anemia, microcytic  · Iron deficiency  DVT with filter  Pulmonary nodules   Dysphagia      CARDIAC IMAGING:  Echo (5/20/21)  · LV: Estimated LVEF is 20 - 25%. Normal wall thickness. Mildly dilated left ventricle. Severely and globally reduced systolic function. Severe (grade 4) left ventricular diastolic dysfunction. · LA: Severely dilated left atrium. · RA: Severely dilated right atrium. · MV: Moderate mitral valve regurgitation is present. · TV: Moderate tricuspid valve regurgitation is present. · AO: Mild aortic root dilatation. · RV: Pacer/ICD present. · LVED 6.2cm  EKG (5/20/21) SB with 1degree AV block, QRS 116ms     [facility] 5/24/21 Native Coronaries: LM - moderate to severe distal disease 50%. % prox occluded (), heavily calcified, LCx: 80% proximal stenosis. OM1 is  (seen filling faintly via collaterals).  OM2 99% stenosis  RCA: 100% proximal occluded.   LIMA to LAD: patent, supplies only a diagonal branch (probably D2). The LAD distal to the bifurcation is 100% occluded () and fills via right to left collaterals off the SVG to   RCA.   SVG to R-PDA: patent with moderate diffuse irregularities but no obstructive disease in the graft.    No appealing interventional targets.      NST as above     ICD interrogation (5/12/21) [employer] single lead, no events, normal device function and good battery     HEMODYNAMICS:  RHC 5/24/21 CI 1.97, PA 33/9/17, RA 1, PCWP 6- off milrinone   CPEST not done      6 Min Walk Report 7/6/2021 5/20/2021   (PRE) HR 98 74   (PRE) O2 Sat - 99   (POST) HR - 81   (POST) O2 Sat 98% on Ra 99   Distance in Meters - 1158.24          OTHER IMAGING:  CXR (6/17/21) clear  CT 5/21/21 1. Irregular pulmonary nodule in the left upper lobe measuring 2 cm, suspicious for primary pulmonary malignancy. 2. Additional 6 mm left upper lobe pulmonary nodule. 3. Severe calcific atherosclerosis of the coronary arteries and abdominal aorta. No aneurysm. 4. Cardiomegaly. 5. No acute abnormality within the chest, abdomen, or pelvis.     HISTORY OF PRESENT ILLNESS:  I had the pleasure of seeing Heron Oconnell Sr in [facility] at [address] [place] [address] in [address] [place] [address] a 77 y. o. male with h/o HTN, HL, DM2, SRUTHI on CPAP, chronic systolic heart failure, stage D, NYHA class IV symptoms, non-ischemic cardiomyopathy, LVEF 20% with LVEDD 6.2, RV dysfunciton, TAPSE 1.22, TBili 1.5 likely from cardiac congestion, recent cardiac arrest s/p ICD (1/2013, [employer] followed by [facility]), coronary artery disease s/p multiple interventions s/p 4V CABG (8/2012), LHC (7/2019) severe stenosis of LIMA to LAD anastomosis site, SVG graft to OM is occluded, SVG graft to RCA 40-50%, LAD occluded proximally, severe proximal LCx, RCA is the long . PET (6/2019) EF 24% with anterior lateral and inferior reversible defect. Anemia, microcytic with iron deficiency, DVT with filter.     Patient was referred to AHF Clinic by Dr. Mary Nuno for evaluation of his candidacy for advanced therapies.     Patient agreed to inpatient initiation of palliative infusion of chronic inotropes and evaluation for LVAD-DT. Patient was admitted 5/2-5/25/21. Patient was started on milrinone infusion and had first part of LVAD evaluation completed. Right and left heart cath on 5/24/21 that showed severe diffuse native vessel CAD, with patent grafts (LIMA to D2, SVG to RCA). Antiplatelet therapy was held during hospitalization and after discharge due to anticipated pulmonary nodule biopsy, bone marrow biopsy and EGD/C-Scope as part of LVAD workup.     Patient was readmitted 5/26-5/28/21 with hypertension, headache and chest pain, his troponin peaked at 10; he was shortly bridged with heparin, otherwise had normal EKG and chest CT negative for PE. Cardiology and AHF service was consulted and patient was discharged home after troponin came down.      INTERVAL HISTORY:  Today, patient presents for HF clinic visit. He states he feels well overall, his can do his normal activities, he is having some irritation at the PICC line site but denies issues with the milrinone infusion. He is wearing his lifevest He has completed his radiation regimen  He is compliant with his medications, he has not been participating in cardiac rehab due to recent radiation and the weight of the milrinone pump and lifevest. He is scheduled to have his AICD re implanted next [facility] at [facility]. REVIEW OF SYSTEMS:  General: Denies fever, night sweats.   Ear, nose and throat: Denies difficulty hearing, sinus problems, runny nose, post-nasal drip, ringing in ears, mouth sores, loose teeth, ear pain, nosebleeds, sore throate, facial pain or numbess  Cardiovascular: see above in the interval history  Respiratory: Denies cough, wheezing, sputum production, hemoptysis. Gastrointestinal: Denies heartburn, constipation, diarrhea, abdominal pain, nausea, vomiting, difficulty swallowing, blood in stool  Kidney and bladder: Denies painful urination, frequent urination, urgency  Musculoskeletal: Denies joint pain, muscle weakness  Skin and hair: Denies change in existing skin lesions, hair loss or increase, breast changes    PHYSICAL EXAM:  Visit Vitals  /62 (BP 1 Location: Left arm, BP Patient Position: Sitting, BP Cuff Size: Adult)   Pulse 86   Temp 97.9 °F (36.6 °C) (Oral)   Resp 16   Ht 6' 1\" (1.854 m)   Wt 185 lb 3.2 oz (84 kg)   SpO2 98%   BMI 24.43 kg/m²     General: Patient is well developed, well-nourished in no acute distress  HEENT: Normocephalic and atraumatic. No scleral icterus. Pupils are equal, round and reactive to light and accomodation. No conjunctival injection. Oropharynx is clear. Neck: Supple. No evidence of thyroid enlargements or lymphadenopathy. JVD: Cannot be appreciated   Lungs: Breath sounds are equal and clear bilaterally. No wheezes, rhonchi, or rales. Heart: Regular rate and rhythm with normal S1 and S2. No murmurs, gallops or rubs. Abdomen: Soft, no mass or tenderness. No organomegaly or hernia. Bowel sounds present. Genitourinary and rectal: deferred  Extremities: No cyanosis, clubbing, or edema. Neurologic: No focal sensory or motor deficits are noted. Grossly intact. Psychiatric: Awake, alert an doriented x 3. Appropriate mood and affect. Skin: Warm, dry and well perfused. No lesions, nodules or rashes are noted.     PAST MEDICAL HISTORY:  Past Medical History:   Diagnosis Date    CAD (coronary artery disease) '90 '97, '13 x 2    [place] [facility]    Calculus of kidney     Colonic polyps     Congestive heart failure, unspecified     Diabetes ([facility] [address]     Gastritis     Hypercholesterolemia     Sleep apnea        PAST SURGICAL HISTORY:  Past Surgical History:   Procedure Laterality Date    COLONOSCOPY  04/06/2011    16, due 21    ENDOSCOPY, COLON, DIAGNOSTIC      11, due 16    HX CORONARY ARTERY BYPASS GRAFT  8/22/12    x 4 vessel by MISSY LION    HX PACEMAKER PLACEMENT  1/30/13    MT CARDIAC SURG PROCEDURE UNLIST  2012    x 4 vessels       FAMILY HISTORY:  Family History   Problem Relation Age of Onset    Heart Disease Mother         MI    Heart Disease Father         CAD & PVD    Lung Disease Father     Cancer Father         lung    Diabetes Maternal Grandmother     Heart Disease Other         CAD    Stroke Sister        SOCIAL HISTORY:  Social History     Socioeconomic History    Marital status:      Spouse name: Not on file    Number of children: Not on file    Years of education: Not on file    Highest education level: Not on file   Tobacco Use    Smoking status: Never Smoker    Smokeless tobacco: Never Used   Substance and Sexual Activity    Alcohol use: Yes     Alcohol/week: 0.0 standard drinks     Comment:  VERY RARE    Drug use: No     Social Determinants of Health     Financial Resource Strain:     Difficulty of Paying Living Expenses:    Food Insecurity:     Worried About Running Out of Food in the Last Year:     [address] in the Last Year:    Transportation Needs:     Lack of Transportation (Medical):  Lack of Transportation (Non-Medical):    Physical Activity:     Days of Exercise per Week:     Minutes of Exercise per Session:    Stress:     Feeling of Stress :    Social Connections:     Frequency of Communication with Friends and Family:     Frequency of Social Gatherings with Friends and Family:     Attends [employer] Services:     Active Member of Clubs or Organizations:     Attends Club or Organization Meetings:     Marital Status:        LABORATORY RESULTS:  No flowsheet data found.     ALLERGY:  Allergies   Allergen Reactions    Oxycodone Anaphylaxis    Pcn [Penicillins] Hives        CURRENT MEDICATIONS:    Current Outpatient Medications:     diphenhydrAMINE (Benadryl Allergy) 25 mg tablet, Take 25 mg by mouth every six (6) hours as needed. Needed every night d/t picc line causing itching, Disp: , Rfl:     metFORMIN (GLUCOPHAGE) 500 mg tablet, Take 1 Tablet by mouth two (2) times daily (with meals). , Disp: 60 Tablet, Rfl: 5    benzonatate (TESSALON) 200 mg capsule, TAKE 1 CAPSULE BY MOUTH THREE TIMES DAILY AS NEEDED FOR COUGH, Disp: 30 Capsule, Rfl: 2    dapagliflozin (Farxiga) 10 mg tab tablet, Take 1 Tablet by mouth., Disp: , Rfl:     ergocalciferol (ERGOCALCIFEROL) 1,250 mcg (50,000 unit) capsule, TAKE 1 CAPSULE BY MOUTH EVERY 7 DAYS FOR 11 DOSES, Disp: 11 Capsule, Rfl: 0    glucose blood VI test strips (OneTouch Ultra Test) strip, USE TO TEST BLOOD SUGAR DAILY, Disp: 100 Strip, Rfl: 3    OneTouch Delica Plus Lancet 33 gauge misc, USE TO TEST BLOOD SUGAR DAILY, Disp: 100 Lancet, Rfl: 3    omeprazole (PRILOSEC) 20 mg capsule, TAKE 1 CAPSULE BY MOUTH DAILY FOR INDIGESTION, Disp: 30 Capsule, Rfl: 1    ranolazine ER (RANEXA) 1,000 mg, Take 500 mg by mouth two (2) times a day., Disp: , Rfl:     eplerenone (INSPRA) 50 mg tab tablet, Take 1 Tablet by mouth daily. , Disp: 90 Tablet, Rfl: 1    isosorbide mononitrate ER (IMDUR) 30 mg tablet, Take 1 Tablet by mouth every twelve (12) hours. , Disp: 180 Tablet, Rfl: 1    hydrALAZINE (APRESOLINE) 50 mg tablet, Take 1 Tablet by mouth three (3) times daily. , Disp: 180 Tablet, Rfl: 2    milrinone (PRIMACOR) 20 mg/100 mL (200 mcg/mL) infusion, 26.58 mcg/min by IntraVENous route continuous. (Patient taking differently: 0.3 mcg/kg/min by IntraVENous route continuous.  Based on weight of 86.4 kg), Disp: 100 mL, Rfl: 0    ondansetron hcl (ZOFRAN) 4 mg tablet, Take 1 Tab by mouth every eight (8) hours as needed for Nausea, Vomiting or Nausea or Vomiting., Disp: 20 Tab, Rfl: 1    rosuvastatin (CRESTOR) 10 mg tablet, TAKE 1 TABLET NIGHTLY, Disp: 90 Tab, Rfl: 3   tamsulosin (FLOMAX) 0.4 mg capsule, TAKE 1 CAPSULE DAILY, Disp: 90 Cap, Rfl: 3    lancets misc, Use to test blood sugar daily, Disp: 100 Each, Rfl: 11    bumetanide (BUMEX) 2 mg tablet, Take  by mouth. 1/2 tab every other day., Disp: , Rfl: 0    acetaminophen (TYLENOL EXTRA STRENGTH) 500 mg tablet, Take  by mouth every six (6) hours as needed. , Disp: , Rfl:     aspirin delayed-release 81 mg tablet, Take 81 mg by mouth daily. , Disp: , Rfl:     prasugreL (Effient) 10 mg tablet, Take 1 Tablet by mouth daily. Per cardiology (Patient not taking: Reported on 7/13/2021), Disp: 90 Tablet, Rfl: 3    magnesium oxide (MAG-OX) 400 mg tablet, Take 1 Tablet by mouth daily. (Patient not taking: Reported on 9/1/2021), Disp: 90 Tablet, Rfl: 1    potassium chloride SR (K-TAB) 20 mEq tablet, Take 2 Tablets by mouth two (2) times a day. (Patient not taking: Reported on 8/19/2021), Disp: 240 Tablet, Rfl: 1    Thank you for your referral and allowing me to participate in this patient's care.     Leo Lindquist NP  [facility] [address], [address]  Phone: [phone]    PATIENT CARE TEAM:  Patient Care Team:  Nelly Vasquez MD as PCP - General (Internal Medicine)  Nelly Vasquez MD as PCP - [facility]  Sunny Davis MD as Physician (Cardiology)  Thomas Garvey MD as Physician (Cardiology)  Pamella Tariq MD as Physician (Gastroenterology)  Cb Beltran MD as Physician (Orthopedic Surgery)  Carla Renteria MD as Physician (Ophthalmology)  Awanda Moll Wilms, MD as Physician ([address])  Celia Salinas MD (Cardiology)  Kuldeep Coto MD (Cardiology)     Total visit time: 40 minutes (> 50% spent face-to-face counseling)

## 2021-10-21 ENCOUNTER — TELEPHONE (OUTPATIENT)
Dept: INTERNAL MEDICINE CLINIC | Age: 76
End: 2021-10-21

## 2021-10-21 NOTE — TELEPHONE ENCOUNTER
Reason for call:  Please call to discuss his blood sugar results     Is this a new problem: yes     Date of last appointment:  9/1/2021     Can we respond via Ze[employer]ot: no    Best call back number: * [phone]

## 2021-11-09 ENCOUNTER — TELEPHONE (OUTPATIENT)
Dept: CARDIAC REHAB | Age: 76
End: 2021-11-09

## 2021-11-09 NOTE — TELEPHONE ENCOUNTER
Cardiac Rehab: Call placed to Manda Oro to inquire about return status for cardiac rehab exercise. Left voicemail message asking for return call back.

## 2021-11-11 ENCOUNTER — TELEPHONE (OUTPATIENT)
Dept: CARDIOLOGY CLINIC | Age: 76
End: 2021-11-11

## 2021-11-11 RX ORDER — ERGOCALCIFEROL 1.25 MG/1
CAPSULE ORAL
Qty: 12 CAPSULE | Refills: 1 | Status: SHIPPED | OUTPATIENT
Start: 2021-11-11 | End: 2022-02-05

## 2021-11-11 NOTE — TELEPHONE ENCOUNTER
Telephone Call RE:  Appointment reminder     Outcome:     [x] Patient confirmed appointment   [] Patient rescheduled appointment for    [] Unable to reach   [] Left message              [] Other:     Screened for [facility]

## 2021-11-15 ENCOUNTER — TELEPHONE (OUTPATIENT)
Dept: CARDIOLOGY CLINIC | Age: 76
End: 2021-11-15

## 2021-11-15 RX ORDER — METFORMIN HYDROCHLORIDE 500 MG/1
500 TABLET ORAL 2 TIMES DAILY WITH MEALS
Qty: 180 TABLET | Refills: 0 | Status: SHIPPED | COMMUNITY
Start: 2021-11-15 | End: 2022-01-11 | Stop reason: SDUPTHER

## 2021-11-15 NOTE — TELEPHONE ENCOUNTER
Requested Prescriptions     Pending Prescriptions Disp Refills    metFORMIN (GLUCOPHAGE) 500 mg tablet 60 Tablet 5     Sig: Take 1 Tablet by mouth two (2) times daily (with meals).                [address], [place] - [address] [facility]  [phone]

## 2021-11-15 NOTE — TELEPHONE ENCOUNTER
Reviewed labs collected on 11/12/2021. They are WNL except: Liver enzymes slightly elevated but trending down. NT ProBNP elevated at 3010 but also trending down from May 2021 (@3531), so he can continue his current diuretic regimen. Mag is 1.8 but he already got a script for magnesium oxide 400 mg daily sent to his pharmacy. Patient to be notified.

## 2021-11-23 ENCOUNTER — HOSPITAL ENCOUNTER (OUTPATIENT)
Dept: NON INVASIVE DIAGNOSTICS | Age: 76
Discharge: HOME | End: 2021-11-23
Attending: NURSE PRACTITIONER
Payer: MEDICARE

## 2021-11-23 VITALS
WEIGHT: 192.9 LBS | SYSTOLIC BLOOD PRESSURE: 112 MMHG | DIASTOLIC BLOOD PRESSURE: 72 MMHG | HEIGHT: 73 IN | BODY MASS INDEX: 25.57 KG/M2

## 2021-11-23 DIAGNOSIS — I50.22 CHRONIC SYSTOLIC HEART FAILURE (HCC): ICD-10-CM

## 2021-11-23 LAB
ECHO AR MAX VEL PISA: 440.56 CM/S
ECHO AV AREA PEAK VELOCITY: 2.9 CM2
ECHO AV AREA/BSA PEAK VELOCITY: 1.4 CM2/M2
ECHO AV PEAK GRADIENT: 5.06 MMHG
ECHO AV PEAK VELOCITY: 112.48 CM/S
ECHO AV REGURGITANT PHT: 522.65 MS
ECHO LA AREA 4C: 28.01 CM2
ECHO LA TO AORTIC ROOT RATIO: 1.53
ECHO LA TO AORTIC ROOT RATIO: 1.53
ECHO LA VOL 4C: 101.05 ML (ref 18–58)
ECHO LA VOLUME INDEX A4C: 47.67 ML/M2 (ref 16–28)
ECHO LV EDV A2C: 217.25 ML
ECHO LV EDV A4C: 218.44 ML
ECHO LV EDV BP: 226.6 ML (ref 67–155)
ECHO LV EDV INDEX A4C: 103 ML/M2
ECHO LV EDV INDEX BP: 106.9 ML/M2
ECHO LV EDV NDEX A2C: 102.5 ML/M2
ECHO LV EJECTION FRACTION A2C: 25 PERCENT
ECHO LV EJECTION FRACTION A4C: 21 PERCENT
ECHO LV EJECTION FRACTION BIPLANE: 22.4 PERCENT (ref 55–100)
ECHO LV ESV A2C: 162.08 ML
ECHO LV ESV A4C: 173.63 ML
ECHO LV ESV BP: 175.95 ML (ref 22–58)
ECHO LV ESV INDEX A2C: 76.5 ML/M2
ECHO LV ESV INDEX A4C: 81.9 ML/M2
ECHO LV ESV INDEX BP: 83 ML/M2
ECHO LV GLOBAL LONGITUDINAL STRAIN (GLS): -6.4 PERCENT
ECHO LV INTERNAL DIMENSION DIASTOLIC: 5.65 CM (ref 4.2–5.9)
ECHO LV INTERNAL DIMENSION SYSTOLIC: 5.4 CM
ECHO LV IVSD: 1.12 CM (ref 0.6–1)
ECHO LV IVSS: 5.4 CM
ECHO LV MASS 2D: 276.5 G (ref 88–224)
ECHO LV MASS INDEX 2D: 130.4 G/M2 (ref 49–115)
ECHO LV POSTERIOR WALL DIASTOLIC: 1.23 CM (ref 0.6–1)
ECHO LVOT DIAM: 2.28 CM
ECHO LVOT PEAK GRADIENT: 2.56 MMHG
ECHO LVOT PEAK VELOCITY: 80.04 CM/S
ECHO LVOT SV: 52.5 ML
ECHO LVOT VTI: 12.87 CM
ECHO MV EROA PISA: 1.18 CM2
ECHO MV REGURGITANT RADIUS PISA: 1.66 CM
ECHO MV REGURGITANT VOLUME: 164.83 ML
ECHO MV REGURGITANT VTIA: 139.81 CM
ECHO MV REGURGITANT VTIA: 143.95 CM
ECHO RV INTERNAL DIMENSION: 6.17 CM
ECHO RV TAPSE: 1.15 CM (ref 1.5–2)
ECHO TV MEAN GRADIENT: 60.42 MMHG
ECHO TV REGURGITANT MAX VELOCITY: 330.29 CM/S
ECHO TV REGURGITANT MAX VELOCITY: 474.49 CM/S
ECHO TV REGURGITANT PEAK GRADIENT: 43.64 MMHG
GLOBAL LONGITUDINAL STRAIN 2 CHAMBER: -7.4 PERCENT
GLOBAL LONGITUDINAL STRAIN 4 CHAMBER: -7 PERCENT
GLOBAL LONGITUDINAL STRAIN LONG AXIS: -4.8 PERCENT
MR PISA PV: 476.46 CM/S

## 2021-11-23 PROCEDURE — 93306 TTE W/DOPPLER COMPLETE: CPT

## 2021-11-24 ENCOUNTER — TELEPHONE (OUTPATIENT)
Dept: CARDIOLOGY CLINIC | Age: 76
End: 2021-11-24

## 2021-11-24 NOTE — TELEPHONE ENCOUNTER
Reviewed pt echo results. Decline in EF to 15-20% with abnormal LV strain, dilated RV with mildly reduced systolic function, mod-severe MR (previously moderate), mild AR (previously trace), mod TR. Pt recently seen, wanted to hold off on completing VAD eval until after the holidays. Pt needs to be extremely careful with fluid status, especially with upcoming holiday meals, at high risk for acute decompensation. If he is interested in LVAD as an option, he may not want to delay completing the evaluation.

## 2021-11-24 NOTE — TELEPHONE ENCOUNTER
I called patient, reviewed echocardiogram readings per Luana Tam. He states understanding, states he is feeling well and denies shortness of breath. Reviewed low sodium diet, especially with holidays, will scheduled him for follow up in clinic within next 2 months. He has no further questions.

## 2021-12-01 ENCOUNTER — HOSPITAL ENCOUNTER (OUTPATIENT)
Dept: CARDIAC REHAB | Age: 76
Discharge: HOME | End: 2021-12-01
Payer: MEDICARE

## 2021-12-01 VITALS — WEIGHT: 195 LBS | BODY MASS INDEX: 25.73 KG/M2

## 2021-12-01 PROCEDURE — 93798 PHYS/QHP OP CAR RHAB W/ECG: CPT

## 2021-12-06 ENCOUNTER — HOSPITAL ENCOUNTER (OUTPATIENT)
Dept: CARDIAC REHAB | Age: 76
Discharge: HOME | End: 2021-12-06
Payer: MEDICARE

## 2021-12-06 VITALS — BODY MASS INDEX: 25.6 KG/M2 | WEIGHT: 194 LBS

## 2021-12-06 PROCEDURE — 93798 PHYS/QHP OP CAR RHAB W/ECG: CPT

## 2021-12-07 ENCOUNTER — TELEPHONE (OUTPATIENT)
Dept: INTERNAL MEDICINE CLINIC | Age: 76
End: 2021-12-07

## 2021-12-07 NOTE — TELEPHONE ENCOUNTER
Spoke with Juanito Sender with [facility]. She advised me pt's insurance does not cover pt getting diabetic supplies through [address]. They use their company. She has re-faxed order form for MD to sign.  Will forward to MD.

## 2021-12-07 NOTE — TELEPHONE ENCOUNTER
Reason for call:  Rita Wang from [facility] calling in re to order scanned into Media under DME on 11/01/2021. Order is from 10/13/2021. Diabetes clinic states that the pt's insurance no longer covers [facility]. Requesting call back.     Is this a new problem: yes     Date of last appointment:  9/1/2021     Can we respond via AppFirst: no    Best call back number: [phone]

## 2021-12-08 ENCOUNTER — HOSPITAL ENCOUNTER (OUTPATIENT)
Dept: CARDIAC REHAB | Age: 76
Discharge: HOME | End: 2021-12-08
Payer: MEDICARE

## 2021-12-08 VITALS — BODY MASS INDEX: 25.6 KG/M2 | WEIGHT: 194 LBS

## 2021-12-08 PROCEDURE — 93798 PHYS/QHP OP CAR RHAB W/ECG: CPT

## 2021-12-13 ENCOUNTER — APPOINTMENT (OUTPATIENT)
Dept: CARDIAC REHAB | Age: 76
End: 2021-12-13
Payer: MEDICARE

## 2021-12-15 ENCOUNTER — HOSPITAL ENCOUNTER (OUTPATIENT)
Dept: CARDIAC REHAB | Age: 76
Discharge: HOME | End: 2021-12-15
Payer: MEDICARE

## 2021-12-15 VITALS — WEIGHT: 192 LBS | BODY MASS INDEX: 25.33 KG/M2

## 2021-12-15 PROCEDURE — 93798 PHYS/QHP OP CAR RHAB W/ECG: CPT

## 2021-12-17 RX ORDER — HYDRALAZINE HYDROCHLORIDE 50 MG/1
TABLET, FILM COATED ORAL
Qty: 180 TABLET | Refills: 2 | Status: ON HOLD | OUTPATIENT
Start: 2021-12-17 | End: 2022-05-06 | Stop reason: SDUPTHER

## 2021-12-20 ENCOUNTER — HOSPITAL ENCOUNTER (OUTPATIENT)
Dept: CARDIAC REHAB | Age: 76
Discharge: HOME | End: 2021-12-20
Payer: MEDICARE

## 2021-12-20 VITALS — BODY MASS INDEX: 25.6 KG/M2 | WEIGHT: 194 LBS

## 2021-12-20 PROCEDURE — 93798 PHYS/QHP OP CAR RHAB W/ECG: CPT

## 2021-12-27 ENCOUNTER — HOSPITAL ENCOUNTER (OUTPATIENT)
Dept: CARDIAC REHAB | Age: 76
Discharge: HOME | End: 2021-12-27
Payer: MEDICARE

## 2021-12-27 VITALS — BODY MASS INDEX: 26.91 KG/M2 | WEIGHT: 204 LBS

## 2021-12-27 PROCEDURE — 93798 PHYS/QHP OP CAR RHAB W/ECG: CPT

## 2022-01-03 ENCOUNTER — APPOINTMENT (OUTPATIENT)
Dept: CARDIAC REHAB | Age: 77
End: 2022-01-03
Payer: MEDICARE

## 2022-01-04 RX ORDER — ISOSORBIDE MONONITRATE 30 MG/1
30 TABLET, EXTENDED RELEASE ORAL EVERY 12 HOURS
Qty: 180 TABLET | Refills: 1 | Status: SHIPPED | OUTPATIENT
Start: 2022-01-04 | End: 2022-05-06

## 2022-01-04 NOTE — TELEPHONE ENCOUNTER
Requested Prescriptions     Signed Prescriptions Disp Refills    isosorbide mononitrate ER (IMDUR) 30 mg tablet 180 Tablet 1     Sig: Take 1 Tablet by mouth every twelve (12) hours.      Authorizing Provider: Trice Daniel     Ordering User: Whit East

## 2022-01-10 ENCOUNTER — TELEPHONE (OUTPATIENT)
Dept: CARDIAC REHAB | Age: 77
End: 2022-01-10

## 2022-01-10 ENCOUNTER — TELEPHONE (OUTPATIENT)
Dept: CARDIOLOGY CLINIC | Age: 77
End: 2022-01-10

## 2022-01-10 NOTE — TELEPHONE ENCOUNTER
Patient discussed at weekly [facility] meeting 1/7/22. Per providers patient to be contacted to discuss resuming LVAD work up as he previously stated would like to wait until after the holidays. Contacted patient to discuss. He stated he would like to wait until the weather is warmer to resume. Per Mr. Mirtha Davis, \"That's my goal. To wait until it's a little warmer. \" Patient confirmed he does not want to proceed with any further testing at this time. He stated will discuss with provider at upcoming appointment on 1/24/22. He had no further questions. Haim Henriquez NP and Dr. Omer Landaverde notified. Monika Davis RN.

## 2022-01-10 NOTE — TELEPHONE ENCOUNTER
Pt missed appointment this morning. Call placed to check on pt. He states he is feeling well and he will be back for his next appt this Wednesday. Reminded pt to call clinic when he can't make it to appointments. He verbalized understanding.

## 2022-01-11 ENCOUNTER — OFFICE VISIT (OUTPATIENT)
Dept: INTERNAL MEDICINE CLINIC | Age: 77
End: 2022-01-11
Payer: MEDICARE

## 2022-01-11 VITALS
BODY MASS INDEX: 26.11 KG/M2 | DIASTOLIC BLOOD PRESSURE: 74 MMHG | WEIGHT: 197 LBS | OXYGEN SATURATION: 100 % | SYSTOLIC BLOOD PRESSURE: 118 MMHG | RESPIRATION RATE: 16 BRPM | TEMPERATURE: 97.1 F | HEART RATE: 88 BPM | HEIGHT: 73 IN

## 2022-01-11 DIAGNOSIS — E11.51 TYPE 2 DIABETES, CONTROLLED, WITH PERIPHERAL CIRCULATORY DISORDER (HCC): ICD-10-CM

## 2022-01-11 DIAGNOSIS — I50.22 CHRONIC SYSTOLIC HEART FAILURE (HCC): ICD-10-CM

## 2022-01-11 DIAGNOSIS — E78.2 MIXED HYPERLIPIDEMIA: Primary | ICD-10-CM

## 2022-01-11 DIAGNOSIS — D61.818 PANCYTOPENIA (HCC): ICD-10-CM

## 2022-01-11 DIAGNOSIS — C34.92 ADENOCARCINOMA OF LUNG, LEFT (HCC): ICD-10-CM

## 2022-01-11 DIAGNOSIS — I25.708 CORONARY ARTERY DISEASE INVOLVING CORONARY BYPASS GRAFT OF NATIVE HEART WITH OTHER FORMS OF ANGINA PECTORIS (HCC): ICD-10-CM

## 2022-01-11 PROBLEM — E43 SEVERE PROTEIN-CALORIE MALNUTRITION (HCC): Status: RESOLVED | Noted: 2021-05-21 | Resolved: 2022-01-11

## 2022-01-11 LAB
EST. AVERAGE GLUCOSE BLD GHB EST-MCNC: 148 MG/DL
HBA1C MFR BLD: 6.8 % (ref 4–5.6)

## 2022-01-11 PROCEDURE — G8417 CALC BMI ABV UP PARAM F/U: HCPCS | Performed by: INTERNAL MEDICINE

## 2022-01-11 PROCEDURE — G8427 DOCREV CUR MEDS BY ELIG CLIN: HCPCS | Performed by: INTERNAL MEDICINE

## 2022-01-11 PROCEDURE — G8510 SCR DEP NEG, NO PLAN REQD: HCPCS | Performed by: INTERNAL MEDICINE

## 2022-01-11 PROCEDURE — 99214 OFFICE O/P EST MOD 30 MIN: CPT | Performed by: INTERNAL MEDICINE

## 2022-01-11 PROCEDURE — 2022F DILAT RTA XM EVC RTNOPTHY: CPT | Performed by: INTERNAL MEDICINE

## 2022-01-11 PROCEDURE — 3017F COLORECTAL CA SCREEN DOC REV: CPT | Performed by: INTERNAL MEDICINE

## 2022-01-11 PROCEDURE — 3044F HG A1C LEVEL LT 7.0%: CPT | Performed by: INTERNAL MEDICINE

## 2022-01-11 PROCEDURE — G8536 NO DOC ELDER MAL SCRN: HCPCS | Performed by: INTERNAL MEDICINE

## 2022-01-11 PROCEDURE — 1101F PT FALLS ASSESS-DOCD LE1/YR: CPT | Performed by: INTERNAL MEDICINE

## 2022-01-11 RX ORDER — EPLERENONE 50 MG/1
50 TABLET, FILM COATED ORAL DAILY
Qty: 90 TABLET | Refills: 3 | Status: ON HOLD | COMMUNITY
Start: 2022-01-11 | End: 2022-05-06 | Stop reason: SDUPTHER

## 2022-01-11 RX ORDER — RANOLAZINE 500 MG/1
500 TABLET, EXTENDED RELEASE ORAL 2 TIMES DAILY
Qty: 180 TABLET | Refills: 3 | Status: SHIPPED | COMMUNITY
Start: 2022-01-11 | End: 2022-05-06

## 2022-01-11 RX ORDER — METFORMIN HYDROCHLORIDE 500 MG/1
500 TABLET ORAL 2 TIMES DAILY WITH MEALS
Qty: 180 TABLET | Refills: 3 | Status: SHIPPED | OUTPATIENT
Start: 2022-01-11

## 2022-01-11 NOTE — PROGRESS NOTES
HISTORY OF PRESENT ILLNESS  Joyce Tavarez is a 77 y.o. male. HPI  Assessment:  Trace Baig is seen today for follow up of diabetes and other problems. He is accompanied by his wife today. 1. Diabetes. Due for routine lab recheck. He has no symptoms of accelerated diabetes. 2. Hyperlipidemia. Up to date with labs from his cardiologist.  3. CAD, CHF. He is planning on proceeding with LVAD placement. He follows up closely with the CHF team.  4. Lung cancer. Up to date with follow up. He is post radiation therapy. 5. Hypertension, fair. Will follow. Would defer management primarily to cardiology at this point. Review of Systems   Constitutional: Positive for malaise/fatigue. Negative for weight loss. Respiratory: Positive for shortness of breath. Cardiovascular: Negative for chest pain, palpitations, leg swelling and PND. Musculoskeletal: Negative for myalgias. Neurological: Negative for focal weakness. Physical Exam  Vitals and nursing note reviewed. Constitutional:       General: He is not in acute distress. Neck:      Vascular: No carotid bruit. Cardiovascular:      Rate and Rhythm: Normal rate and regular rhythm. Heart sounds: No murmur heard. No friction rub. No gallop. Pulmonary:      Effort: Pulmonary effort is normal. No respiratory distress. Breath sounds: Normal breath sounds. Musculoskeletal:      Right lower leg: Edema present. Left lower leg: Edema present. Comments: Mild bilateral lower extremity edema          ASSESSMENT and PLAN  Diagnoses and all orders for this visit:    1. Mixed hyperlipidemia    2. Adenocarcinoma of lung, left ([address]    3. Pancytopenia ([address]    4. Chronic systolic heart failure (HCC)  -     eplerenone (INSPRA) 50 mg tab tablet; Take 1 Tablet by mouth daily. 5. Type 2 diabetes, controlled, with peripheral circulatory disorder (HCC)  -     HEMOGLOBIN A1C WITH EAG; Future    6.  Coronary artery disease involving coronary bypass graft of native heart with other forms of angina pectoris ([facility] [address]    Other orders  -     metFORMIN (GLUCOPHAGE) 500 mg tablet; Take 1 Tablet by mouth two (2) times daily (with meals). -     ranolazine ER (Ranexa) 500 mg SR tablet; Take 1 Tablet by mouth two (2) times a day.

## 2022-01-11 NOTE — TELEPHONE ENCOUNTER
Per Dr. Demetrio Patton will discuss holding work up at [facility] this week until patient wishes to proceed further. Graciela Feng RN.

## 2022-01-11 NOTE — PROGRESS NOTES
Verified name and birth date for privacy precautions. Chart reviewed in preparation for today's visit.      Chief Complaint   Patient presents with    Diabetes          Health Maintenance Due   Topic    Shingrix Vaccine Age 49> (1 of 2)    Eye Exam Retinal or Dilated     Colorectal Cancer Screening Combo     Flu Vaccine (1)    Medicare Yearly Exam          Wt Readings from Last 3 Encounters:   01/11/22 197 lb (89.4 kg)   12/27/21 204 lb (92.5 kg)   12/20/21 194 lb (88 kg)     Temp Readings from Last 3 Encounters:   01/11/22 97.1 °F (36.2 °C) (Temporal)   11/12/21 98.3 °F (36.8 °C) (Oral)   10/14/21 97.9 °F (36.6 °C) (Oral)     BP Readings from Last 3 Encounters:   01/11/22 (!) 152/77   11/23/21 112/72   11/12/21 112/72     Pulse Readings from Last 3 Encounters:   01/11/22 88   11/12/21 87   10/14/21 86         Learning Assessment:  :     Learning Assessment 9/5/2017 4/20/2015 6/11/2014 3/13/2014 2/12/2014   PRIMARY LEARNER Patient Patient Patient Patient Patient   HIGHEST LEVEL OF EDUCATION - PRIMARY LEARNER  - GRADUATED HIGH SCHOOL OR GED GRADUATED HIGH SCHOOL OR GED GRADUATED HIGH SCHOOL OR GED GRADUATED HIGH SCHOOL OR GED   BARRIERS PRIMARY LEARNER - - NONE NONE NONE   CO-LEARNER CAREGIVER - No Yes Yes No   CO-LEARNER NAME - - Therese black wife -   R Claudia Ramirez 76 - - 2 YEARS OF COLLEGE 2 YEARS [facility] 57 - - ENGLISH ENGLISH -    NEED - - No - -   LEARNER PREFERENCE PRIMARY OTHER (COMMENT) DEMONSTRATION DEMONSTRATION DEMONSTRATION DEMONSTRATION   LEARNER PREFERENCE CO-LEARNER - - DEMONSTRATION DEMONSTRATION -   ANSWERED BY patient self patient self patient   RELATIONSHIP SELF SELF SELF SELF SELF   ASSESSMENT COMMENT - - listening, audio - -       Depression Screening:  :     3 most recent PHQ Screens 1/11/2022   Little interest or pleasure in doing things Not at all   Feeling down, depressed, irritable, or hopeless Not at all   Total Score PHQ 2 0   Trouble falling or staying asleep, or sleeping too much -   Feeling tired or having little energy -   Poor appetite, weight loss, or overeating -   Feeling bad about yourself - or that you are a failure or have let yourself or your family down -   Trouble concentrating on things such as school, work, reading, or watching TV -   Moving or speaking so slowly that other people could have noticed; or the opposite being so fidgety that others notice -   Thoughts of being better off dead, or hurting yourself in some way -   PHQ 9 Score -   How difficult have these problems made it for you to do your work, take care of your home and get along with others -       Fall Risk Assessment:  :     Fall Risk Assessment, last 12 mths 1/11/2022   Able to walk? Yes   Fall in past 12 months? 0   Do you feel unsteady? -   Are you worried about falling 0   Is the gait abnormal? -   Number of falls in past 12 months -   Fall with injury? -       Abuse Screening:  :     Abuse Screening Questionnaire 1/11/2022 6/23/2021   Do you ever feel afraid of your partner? N N   Are you in a relationship with someone who physically or mentally threatens you? N N   Is it safe for you to go home?  - Y

## 2022-01-12 ENCOUNTER — HOSPITAL ENCOUNTER (OUTPATIENT)
Dept: CARDIAC REHAB | Age: 77
Discharge: HOME | End: 2022-01-12
Payer: MEDICARE

## 2022-01-12 VITALS — WEIGHT: 198 LBS | BODY MASS INDEX: 26.12 KG/M2

## 2022-01-12 PROCEDURE — 93798 PHYS/QHP OP CAR RHAB W/ECG: CPT

## 2022-01-14 ENCOUNTER — DOCUMENTATION ONLY (OUTPATIENT)
Dept: CARDIOLOGY CLINIC | Age: 77
End: 2022-01-14

## 2022-01-14 NOTE — PROGRESS NOTES
LVAD/TRANSPLANT CANDIDATE SELECTION COMMITTEE DECISION  [address]   Date: 1/14/22   Committee Members:   Dr. Lorna Harrison MD PhD (Medical Director), Dr. Elton Swain MD (Surgical Director), TEO Escobar (Chief Nurse Practitioner), TEO Hernández ([facility] Nurse Practitioner), Mary Carmen Walton RN (VAD Coordinator), Js Melendez RN (VAD Coordinator), JAKY SavageW (), Tricia Gaspar MS ([facility] Practice Manager)   Criteria Met:   Chronic systolic heart failure   Stage D, NYHA class IV symptoms   LVEF < 25%   Failure to respond to GDMT for at least 45 of the last 60 days   Resting cardiac index < 2.2 L/min/m2   Inotrope dependence for more than 14 days   Absolute contraindications:   Patient does not wish to proceed with LVAD at this time   Relative contraindications:   Incomplete work up- GI scopes   Decision:   Declined- Patient does not want to proceed with further workup/implant at this time.     Plan of care discussion:  N/A   Additional comments:  N/A   Prepared by:   Js Melendez RN   VAD Coordinator

## 2022-01-17 ENCOUNTER — HOSPITAL ENCOUNTER (OUTPATIENT)
Dept: CARDIAC REHAB | Age: 77
Discharge: HOME | End: 2022-01-17
Payer: MEDICARE

## 2022-01-17 VITALS — WEIGHT: 196 LBS | BODY MASS INDEX: 25.86 KG/M2

## 2022-01-17 PROCEDURE — 93798 PHYS/QHP OP CAR RHAB W/ECG: CPT

## 2022-01-19 ENCOUNTER — HOSPITAL ENCOUNTER (OUTPATIENT)
Dept: CARDIAC REHAB | Age: 77
Discharge: HOME | End: 2022-01-19
Payer: MEDICARE

## 2022-01-19 VITALS — BODY MASS INDEX: 25.86 KG/M2 | WEIGHT: 196 LBS

## 2022-01-19 PROCEDURE — 93798 PHYS/QHP OP CAR RHAB W/ECG: CPT

## 2022-01-24 ENCOUNTER — TELEPHONE (OUTPATIENT)
Dept: CARDIOLOGY CLINIC | Age: 77
End: 2022-01-24

## 2022-01-24 ENCOUNTER — HOSPITAL ENCOUNTER (OUTPATIENT)
Dept: GENERAL RADIOLOGY | Age: 77
Discharge: HOME | End: 2022-01-24
Payer: MEDICARE

## 2022-01-24 ENCOUNTER — OFFICE VISIT (OUTPATIENT)
Dept: CARDIOLOGY CLINIC | Age: 77
End: 2022-01-24
Payer: MEDICARE

## 2022-01-24 VITALS
BODY MASS INDEX: 25.92 KG/M2 | WEIGHT: 195.6 LBS | DIASTOLIC BLOOD PRESSURE: 72 MMHG | OXYGEN SATURATION: 98 % | RESPIRATION RATE: 16 BRPM | TEMPERATURE: 96.8 F | HEART RATE: 83 BPM | HEIGHT: 73 IN | SYSTOLIC BLOOD PRESSURE: 118 MMHG

## 2022-01-24 DIAGNOSIS — R05.9 COUGH: ICD-10-CM

## 2022-01-24 DIAGNOSIS — R05.9 COUGH: Primary | ICD-10-CM

## 2022-01-24 PROCEDURE — 71046 X-RAY EXAM CHEST 2 VIEWS: CPT

## 2022-01-24 PROCEDURE — G8536 NO DOC ELDER MAL SCRN: HCPCS | Performed by: NURSE PRACTITIONER

## 2022-01-24 PROCEDURE — 99215 OFFICE O/P EST HI 40 MIN: CPT | Performed by: NURSE PRACTITIONER

## 2022-01-24 PROCEDURE — 1101F PT FALLS ASSESS-DOCD LE1/YR: CPT | Performed by: NURSE PRACTITIONER

## 2022-01-24 PROCEDURE — G8417 CALC BMI ABV UP PARAM F/U: HCPCS | Performed by: NURSE PRACTITIONER

## 2022-01-24 PROCEDURE — G8432 DEP SCR NOT DOC, RNG: HCPCS | Performed by: NURSE PRACTITIONER

## 2022-01-24 PROCEDURE — G8427 DOCREV CUR MEDS BY ELIG CLIN: HCPCS | Performed by: NURSE PRACTITIONER

## 2022-01-24 RX ORDER — PRASUGREL 10 MG/1
10 TABLET, FILM COATED ORAL DAILY
COMMUNITY
End: 2022-05-06

## 2022-01-24 RX ORDER — AZITHROMYCIN 250 MG/1
TABLET, FILM COATED ORAL
Qty: 6 TABLET | Refills: 0 | Status: SHIPPED | OUTPATIENT
Start: 2022-01-24 | End: 2022-01-29

## 2022-01-24 NOTE — TELEPHONE ENCOUNTER
CXR ordered during visit; notable for interval development of LLL PNA. Will order azithromycin and patient needs to have COVID test.  Will add procalcitonin on to [facility] labs for tomorrow.

## 2022-01-24 NOTE — TELEPHONE ENCOUNTER
Fer Holbrook NP  You; Christo Burkett, KIRK; Charlotte Downing, KIRK 21 minutes ago (1:52 PM)     LP    Please see note.  Rx filled.  Needs COVID test ASAP.  Pt to go to ER if develops concerning symptoms. Routing comment      Fer Holbrook NP 25 minutes ago (1:49 PM)     LP       CXR ordered during visit; notable for interval development of LLL PNA. Will order azithromycin and patient needs to have COVID test.  Will add procalcitonin on to [facility] labs for tomorrow. Documentation      Called patient using two patient identifiers. Advised patient on above information per ALICIA Wong Np. Patient states that he just had PNA about 6 months ago and had PNA shot so he doesn't know how he got it. Patient states he has had a non productive cough for about two days but its sporadic. Provided patient with web site [email or web address] com to try to schedule covid test. Notified patient to schedule test and notify us of the results of the covid test once he receives them. Advised patient that prescription has been sent to [facility] on [address]. Patient stated understanding of all instructions and had no further questions.  Jodi Mills RN

## 2022-01-24 NOTE — TELEPHONE ENCOUNTER
Called cardiac connections to add [facility], spoke w/ rowdy.     Haider Brown RN

## 2022-01-24 NOTE — PROGRESS NOTES
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, patient has completed radiation   · Remainder of LVAD workup will be placed on hold including EGD/C-scope until patient wants to move forward; he states he will consider in the spring - did review with patient and wife that continued deferrals may result in missing the \"window of opportunity\" for implant   · Meanwhile, we continue chronic palliative infusion of milrinone, and working on optimizing nutritional status and muscle conditioning  · AICD reimplant on 10/2021  · Continue cardiac rehab   · PA/Lat today due to increased coughing     RECOMMENDATIONS:  Continue current medical therapy for heart failure  Continue milrinone 0.3mcg/kg/min - adjust dose to 88.7 kg   TTE 2021 shows LVEF 15-20%, mod-severe MR, mod TR   Intolerant Coreg due to RV dysfunction   ACEi/ARB/ARNi previously discontinued in anticipation of cardiac surgery; consider resuming based upon labs since patient has deferred surgery for now   Continue current dose of eplerenone 50mg daily  Continue Farxiga 10mg daily  Continue current dose of Hydralazine 50 TID and Imdur 30 mg twice daily  Continue bumex 1mg daily; occasionally misses doses   Continue ASA 81 mg daily   Continue Effient   Continue current dose of rosuvastatin 10 mg daily  Continue ranolazine 500 mg BID (for HR and angina control)  Continue CPAP therapy - uses most of the time   [facility] labs tomorrow   Continue Cardiac Rehab   Advanced care plan forms completed   Diabetes well controlled.  Last A1c 6.4%  on 10/4/21  Genetic test negative   Obtain AICD interrogation today  Reinforced heart healthy, low salt diet  Reinforced appropriate fluid intake of 6 x 8oz glasses of water per day  Monitor and record daily weights and BPs  Follow-up with primary cardiologist Dr. Marvel Corcoran with EP cardiologist  Follow-up with PCP  Follow-up with Cardiac Rehab  Fully vaccinated   PA/Lat today   Return to AHF Clinic in 6 weeks with NP/MD; patient to discuss completing evaluation at the next visit    IMPRESSION:  Fatigue at rest  Shortness of breath with exertion  Acute on chronic systolic heart failure  · Stage D, NYHA class IV symptoms improved to class II on inotropes  · Non-ischemic cardiomyopathy, LVEF 20% with LVEDD 6.2  · RV dysfunction, TAPSE 1.22 (prelimnary read)  · TBili 1.5 likely from cardiac congestion  At risk of sudden cardiac death  · Recent cardiac arrest   · S/p ICD (1/2013, [employer] followed by [facility]); New implant on 10/21/2021 [place] [employer]  Coronary artery disease  · S/p multiple interventions  · S/p 4V CABG (8/2012)  · LHC (7/2019) severe stenosis of LIMA to LAD anastomosis site, SVG graft to OM is occluded, SVG graft to RCA 40-50%, LAD occluded proximally, severe proximal LCx, RCA is the long . · PET (6/2019) EF 24% with anterior lateral and inferior reversible defect  Cardiac risk factors  · HTN  · HL  · DM2  · SRUTHI on CPAP  · MIld carotid stenosis  Anemia, microcytic  · Iron deficiency  DVT with filter  Pulmonary nodules   Dysphagia     CARDIAC IMAGING:  Echo (11/23/21):  · LV 15-20%. · Mod-severe, MR, mod-TR    Echo (5/20/21)  · LV: Estimated LVEF is 20 - 25%. Normal wall thickness. Mildly dilated left ventricle. Severely and globally reduced systolic function. Severe (grade 4) left ventricular diastolic dysfunction. · LA: Severely dilated left atrium. · RA: Severely dilated right atrium. · MV: Moderate mitral valve regurgitation is present.   · TV: Moderate tricuspid valve regurgitation is present. · AO: Mild aortic root dilatation. · RV: Pacer/ICD present. · LVED 6.2cm    EKG (5/20/21) SB with 1degree AV block, QRS 116ms     LHC (5/24/21) Native Coronaries: LM - moderate to severe distal disease 50%. % prox occluded (), heavily calcified, LCx: 80% proximal stenosis. OM1 is  (seen filling faintly via collaterals). OM2 99% stenosis. RCA: 100% proximal occluded.  LIMA to LAD: patent, supplies only a diagonal branch (probably D2). The LAD distal to the bifurcation is 100% occluded () and fills via right to left collaterals off the SVG to RCA. SVG to R-PDA: patent with moderate diffuse irregularities but no obstructive disease in the graft.    No appealing interventional targets.      NST as above     ICD Interrogation (11/12/2021) [employer] VVI, thoracic impedence trending up, no events, normal device function and good battery  ICD interrogation (5/12/21) [place] [employer] single lead, no events, normal device function and good battery     HEMODYNAMICS:  RHC 5/24/21 CI 1.97, PA 33/9/17, RA 1, PCWP 6- off milrinone   CPEST not done     Patient underwent a 6 minute walk test 11/12/2021    6 Min Walk Report 11/12/2021 7/6/2021 5/20/2021   (PRE) HR 88 98 74   (PRE) O2 Sat 100 - 99   (POST)  - 81   (POST) O2 Sat 99 98% on Ra 99   Distance in Meters 386.58 - 1158.24         OTHER IMAGING:  CXR (6/17/21) clear  CT 5/21/21 1. Irregular pulmonary nodule in the left upper lobe measuring 2 cm, suspicious for primary pulmonary malignancy. 2. Additional 6 mm left upper lobe pulmonary nodule. 3. Severe calcific atherosclerosis of the coronary arteries and abdominal aorta. No aneurysm. 4. Cardiomegaly. 5. No acute abnormality within the chest, abdomen, or pelvis. HISTORY OF PRESENT ILLNESS:  I had the pleasure of seeing Aaron Canseco in [address] [place] [address] at [address] [place] [address] in [facility].     Briefly, Aaron Canseco is a 69 y.o. male with h/o HTN, HL, DM2, SRUTHI on CPAP, chronic systolic heart failure, stage D, NYHA class IV symptoms, non-ischemic cardiomyopathy, LVEF 20% with LVEDD 6.2, RV dysfunciton, TAPSE 1.22, TBili 1.5 likely from cardiac congestion, recent cardiac arrest s/p ICD (1/2013, [employer] followed by [facility]), coronary artery disease s/p multiple interventions s/p 4V CABG (8/2012), LHC (7/2019) severe stenosis of LIMA to LAD anastomosis site, SVG graft to OM is occluded, SVG graft to RCA 40-50%, LAD occluded proximally, severe proximal LCx, RCA is the long . PET (6/2019) EF 24% with anterior lateral and inferior reversible defect. Anemia, microcytic with iron deficiency, DVT with filter.     Patient was referred to AHF Clinic by Dr. Jailyn Knott for evaluation of his candidacy for advanced therapies.     Patient agreed to inpatient initiation of palliative infusion of chronic inotropes and evaluation for LVAD-DT. Patient was admitted 5/2-5/25/21. Patient was started on milrinone infusion and had first part of LVAD evaluation completed. Right and left heart cath on 5/24/21 that showed severe diffuse native vessel CAD, with patent grafts (LIMA to D2, SVG to RCA).  Antiplatelet therapy was held during hospitalization and after discharge due to anticipated pulmonary nodule biopsy, bone marrow biopsy and EGD/C-Scope as part of LVAD workup.     Patient was readmitted 5/26-5/28/21 with hypertension, headache and chest pain, his troponin peaked at 10; he was shortly bridged with heparin, otherwise had normal EKG and chest CT negative for PE. Cardiology and AHF service was consulted and patient was discharged home after troponin came down.     Patient has now completed radiation treatment for spot in the lungs. Hem-onc has cleared patient. Patient will follow up with his pulmonary MD in 1-2 months. INTERVAL HISTORY:  Today, I had the pleasure of seeing Mr. Gokul Banda in clinic accompanied by his wife.      Aside from some fatigue and BRAND at times Patient can perform home activities without problem. He denies other cardiac symptoms such as chest pain or leg pain with walking. He reports no volume overload or leg edema. Reports a good appetite. He also denies abdominal bloating, nausea or early satiety. Patient's weight remained stable. He denies orthopnea, PND or nocturia. Denies irregular heart rate or palpitations. No presyncope or syncope. ICD has not fired. Patient is compliant with fluid restriction and taking medications as prescribed. Patient manages his own medications. He does report a slight cough for the last several days which is relieved by Tessalon perles. REVIEW OF SYSTEMS:  Review of Systems   Constitutional: Negative. HENT: Negative. Eyes: Negative. Respiratory: Positive for shortness of breath. Cardiovascular: Negative for chest pain, palpitations and claudication. Gastrointestinal: Negative. Genitourinary: Negative. Musculoskeletal: Negative. Skin: Negative. Neurological: Negative for dizziness, focal weakness and weakness. Psychiatric/Behavioral: Negative. PHYSICAL EXAM:  Visit Vitals  /72 (BP 1 Location: Left arm, BP Patient Position: Sitting, BP Cuff Size: Adult)   Pulse 83   Temp 96.8 °F (36 °C) (Oral)   Resp 16   Ht 6' 1\" (1.854 m)   Wt 195 lb 9.6 oz (88.7 kg)   SpO2 98%   BMI 25.81 kg/m²     Physical Exam  Constitutional:       Appearance: He is normal weight. HENT:      Head: Normocephalic and atraumatic. Eyes:      Extraocular Movements: Extraocular movements intact. Conjunctiva/sclera: Conjunctivae normal.   Cardiovascular:      Rate and Rhythm: Normal rate and regular rhythm. Pulses: Normal pulses. Pulmonary:      Breath sounds: Examination of the left-lower field reveals decreased breath sounds. Decreased breath sounds present. Abdominal:      General: Abdomen is flat. Bowel sounds are normal.      Palpations: Abdomen is soft.    Musculoskeletal:      Cervical back: Normal range of motion and neck supple. Right lower leg: No edema. Left lower leg: No edema. Skin:     General: Skin is warm and dry. Neurological:      General: No focal deficit present. Mental Status: He is alert and oriented to person, place, and time. Psychiatric:         Mood and Affect: Mood normal.          PAST MEDICAL HISTORY:  Past Medical History:   Diagnosis Date    CAD (coronary artery disease) '90  '97, '13 x 2    MI, code ice in 2013 at [facility]    Calculus of kidney     Colonic polyps     Congestive heart failure, unspecified     Diabetes ([facility] [address]     Gastritis     Hypercholesterolemia     Sleep apnea        PAST SURGICAL HISTORY:  Past Surgical History:   Procedure Laterality Date    COLONOSCOPY  04/06/2011    16, due 21    ENDOSCOPY, COLON, DIAGNOSTIC      11, due 16    HX CORONARY ARTERY BYPASS GRAFT  8/22/12    x 4 vessel by MISSY LION    HX PACEMAKER PLACEMENT  1/30/13    RI CARDIAC SURG PROCEDURE UNLIST  2012    x 4 vessels       FAMILY HISTORY:  Family History   Problem Relation Age of Onset    Heart Disease Mother         MI    Heart Disease Father         CAD & PVD    Lung Disease Father     Cancer Father         lung    Diabetes Maternal Grandmother     Heart Disease Other         CAD    Stroke Sister        SOCIAL HISTORY:  Social History     Socioeconomic History    Marital status:    Tobacco Use    Smoking status: Never Smoker    Smokeless tobacco: Never Used   Vaping Use    Vaping Use: Never used   Substance and Sexual Activity    Alcohol use: Yes     Alcohol/week: 0.0 standard drinks     Comment:  VERY RARE    Drug use: No       LABORATORY RESULTS:  No flowsheet data found. ALLERGY:  Allergies   Allergen Reactions    Oxycodone Anaphylaxis    Pcn [Penicillins] Hives        CURRENT MEDICATIONS:    Current Outpatient Medications:     prasugreL (Effient) 10 mg tablet, Take 10 mg by mouth daily. , Disp: , Rfl:     metFORMIN (GLUCOPHAGE) 500 mg tablet, Take 1 Tablet by mouth two (2) times daily (with meals). , Disp: 180 Tablet, Rfl: 3    ranolazine ER (Ranexa) 500 mg SR tablet, Take 1 Tablet by mouth two (2) times a day., Disp: 180 Tablet, Rfl: 3    eplerenone (INSPRA) 50 mg tab tablet, Take 1 Tablet by mouth daily. , Disp: 90 Tablet, Rfl: 3    isosorbide mononitrate ER (IMDUR) 30 mg tablet, Take 1 Tablet by mouth every twelve (12) hours. , Disp: 180 Tablet, Rfl: 1    hydrALAZINE (APRESOLINE) 50 mg tablet, TAKE 1 TABLET BY MOUTH THREE TIMES DAILY, Disp: 180 Tablet, Rfl: 2    dapagliflozin (Farxiga) 10 mg tab tablet, Take 1 Tablet by mouth daily. , Disp: 90 Tablet, Rfl: 1    ergocalciferol (ERGOCALCIFEROL) 1,250 mcg (50,000 unit) capsule, TAKE 1 CAPSULE BY MOUTH EVERY 7 DAYS FOR 11 DOSES, Disp: 12 Capsule, Rfl: 1    diphenhydrAMINE (Benadryl Allergy) 25 mg tablet, Take 25 mg by mouth every six (6) hours as needed. Needed every night d/t picc line causing itching, Disp: , Rfl:     benzonatate (TESSALON) 200 mg capsule, TAKE 1 CAPSULE BY MOUTH THREE TIMES DAILY AS NEEDED FOR COUGH, Disp: 30 Capsule, Rfl: 2    glucose blood VI test strips (OneTouch Ultra Test) strip, USE TO TEST BLOOD SUGAR DAILY, Disp: 100 Strip, Rfl: 3    OneTouch Delica Plus Lancet 33 gauge misc, USE TO TEST BLOOD SUGAR DAILY, Disp: 100 Lancet, Rfl: 3    omeprazole (PRILOSEC) 20 mg capsule, TAKE 1 CAPSULE BY MOUTH DAILY FOR INDIGESTION, Disp: 30 Capsule, Rfl: 1    milrinone (PRIMACOR) 20 mg/100 mL (200 mcg/mL) infusion, 26.58 mcg/min by IntraVENous route continuous. (Patient taking differently: 0.3 mcg/kg/min by IntraVENous route continuous.  Based on weight of 84 kg), Disp: 100 mL, Rfl: 0    rosuvastatin (CRESTOR) 10 mg tablet, TAKE 1 TABLET NIGHTLY, Disp: 90 Tab, Rfl: 3    tamsulosin (FLOMAX) 0.4 mg capsule, TAKE 1 CAPSULE DAILY, Disp: 90 Cap, Rfl: 3    lancets misc, Use to test blood sugar daily, Disp: 100 Each, Rfl: 11    bumetanide (BUMEX) 2 mg tablet, Take 1 mg by mouth daily. , Disp: , Rfl: 0    acetaminophen (TYLENOL EXTRA STRENGTH) 500 mg tablet, Take  by mouth every six (6) hours as needed. , Disp: , Rfl:     aspirin delayed-release 81 mg tablet, Take 81 mg by mouth daily. , Disp: , Rfl:     magnesium oxide (MAG-OX) 400 mg tablet, Take 1 Tablet by mouth daily. (Patient not taking: Reported on 1/11/2022), Disp: 90 Tablet, Rfl: 1      Patient Care Team:  Kanwal Cordero MD as PCP - General (Internal Medicine)  Kanwal Cordero MD as PCP - [address] [place] [address] Provider  Comfort Rodriguez MD as Physician (Cardiology)  Montserrat Casas MD as Physician (Cardiology)  Aj Lam MD as Physician (Gastroenterology)  Randi Posadas MD as Physician (Orthopedic Surgery)  Keith Beck MD as Physician (Ophthalmology)  Giacomo Hau Wilms, MD as Physician ([address])  Carmen Eisenberg MD (Cardiology)  Tory Mcdonough MD (Cardiology)      Thank you for your referral and allowing me to participate in this patient's care.     Lv Winters NP  Heart Failure Nurse Practitioner   Nicolas Whitaker [address]   [address] [facility] [address] [facility] [address], [facility] [address] / Leah Ola: [phone]/ F: [phone]

## 2022-01-24 NOTE — PATIENT INSTRUCTIONS
Medication changes: We will have your [facility] nurse come to adjust your Milrinone to your current weight    Please take this to your pharmacy to notify them of the change in medications. Testing Ordered:    Please have a chest Xray completed today-go to outpatient registration    Other Recommendations:      Ensure your drinking an adequate amount of water with a goal of 6-8 eight ounce glasses (1.5-2 liters) of fluid daily. Your urine should be clear and light yellow straw colored. If your blood pressure begins to consistently run below 90/60 and/or you begin to experience dizziness or lightheadedness, please contact the [facility] at [phone]. Follow up in 6 weeks with NP  with [facility]      Please monitor your weights daily upon waking and after using the bathroom. Keep a written records of your weights and bring to your next appointment. If you have a weight gain of 3 or more pounds overnight OR 5 or more pounds in one week please contact our office. Thank you for allowing us the privilege of being a part of your healthcare team! Please do not hesitate to contact our office at [phone] with any questions or concerns. Virtual Heart Failure [address] invites you to learn more about heart failure and to share your questions, ideas, and experiences with others. Each month, the Heart Failure Support Group features a new educational topic and a guest speaker, followed by an interactive discussion. Our Heart Failure Nurse Navigator will moderate each session. You will be able to participate by phone, tablet or computer through [employer]. This support group takes place on the 3rd Thursday of each month from 6:00-7:30PM. All individuals living with heart failure and their caregivers are welcome to join.      If you are interested in participating, please contact us at [email or web address] and you will be sent the link to join the [facility].

## 2022-01-25 ENCOUNTER — TELEPHONE (OUTPATIENT)
Dept: INTERNAL MEDICINE CLINIC | Age: 77
End: 2022-01-25

## 2022-01-25 ENCOUNTER — TELEPHONE (OUTPATIENT)
Dept: CARDIAC REHAB | Age: 77
End: 2022-01-25

## 2022-01-25 NOTE — TELEPHONE ENCOUNTER
Reason for call: Please call to discuss the appt he had at the Heart clinic yesterday     Is this a new problem: yes     Date of last appointment:  1/11/2022     Can we respond via [employer]: no    Best call back number:Yareli [phone]

## 2022-01-25 NOTE — TELEPHONE ENCOUNTER
Spoke with pt's wife Parveen Robles. She wanted to let Dr Evgeny Heard know about pt's visit with cardiac clinic yesterday. He saw Miles Solorzano, NP - noticed he had a cough. Ordered CXR showing he has LLL pneumonia. Given ZPak. Was advised to be tested for COVID. Parveen Robles wanted to know where to go for this? I recommended [facility] on [address] [place] [address]  - given address and number. Dr Evgeny Heard also suggested she go online to [email or web address] com to register for appt.

## 2022-01-25 NOTE — TELEPHONE ENCOUNTER
Yuliet Abbi called to cancel his cardiac rehab appointments this week. He says he has PNA and does not know when he will return. His cardiac rehab participation has been put on hold. He agreed to contact us when he is ready to return. We will continue to follow up.   Shannon Love RN

## 2022-01-26 ENCOUNTER — APPOINTMENT (OUTPATIENT)
Dept: CARDIAC REHAB | Age: 77
End: 2022-01-26
Payer: MEDICARE

## 2022-01-28 ENCOUNTER — APPOINTMENT (OUTPATIENT)
Dept: CARDIAC REHAB | Age: 77
End: 2022-01-28
Payer: MEDICARE

## 2022-01-31 ENCOUNTER — HOSPITAL ENCOUNTER (OUTPATIENT)
Dept: CARDIAC REHAB | Age: 77
Discharge: HOME | End: 2022-01-31
Payer: MEDICARE

## 2022-01-31 VITALS — BODY MASS INDEX: 25.6 KG/M2 | WEIGHT: 194 LBS

## 2022-01-31 PROCEDURE — 93798 PHYS/QHP OP CAR RHAB W/ECG: CPT

## 2022-02-01 ENCOUNTER — APPOINTMENT (OUTPATIENT)
Dept: GENERAL RADIOLOGY | Age: 77
DRG: 291 | End: 2022-02-01
Attending: STUDENT IN AN ORGANIZED HEALTH CARE EDUCATION/TRAINING PROGRAM
Payer: MEDICARE

## 2022-02-01 ENCOUNTER — APPOINTMENT (OUTPATIENT)
Dept: CT IMAGING | Age: 77
DRG: 291 | End: 2022-02-01
Attending: STUDENT IN AN ORGANIZED HEALTH CARE EDUCATION/TRAINING PROGRAM
Payer: MEDICARE

## 2022-02-01 ENCOUNTER — TELEPHONE (OUTPATIENT)
Dept: CARDIOLOGY CLINIC | Age: 77
End: 2022-02-01

## 2022-02-01 ENCOUNTER — APPOINTMENT (OUTPATIENT)
Dept: VASCULAR SURGERY | Age: 77
DRG: 291 | End: 2022-02-01
Attending: STUDENT IN AN ORGANIZED HEALTH CARE EDUCATION/TRAINING PROGRAM
Payer: MEDICARE

## 2022-02-01 ENCOUNTER — HOSPITAL ENCOUNTER (INPATIENT)
Age: 77
LOS: 4 days | Discharge: HOME | DRG: 291 | End: 2022-02-05
Attending: STUDENT IN AN ORGANIZED HEALTH CARE EDUCATION/TRAINING PROGRAM | Admitting: STUDENT IN AN ORGANIZED HEALTH CARE EDUCATION/TRAINING PROGRAM
Payer: MEDICARE

## 2022-02-01 DIAGNOSIS — E11.51 TYPE 2 DIABETES, CONTROLLED, WITH PERIPHERAL CIRCULATORY DISORDER (HCC): ICD-10-CM

## 2022-02-01 DIAGNOSIS — E11.65 TYPE 2 DIABETES MELLITUS WITH HYPERGLYCEMIA, WITHOUT LONG-TERM CURRENT USE OF INSULIN (HCC): ICD-10-CM

## 2022-02-01 DIAGNOSIS — I50.9 ACUTE ON CHRONIC CONGESTIVE HEART FAILURE, UNSPECIFIED HEART FAILURE TYPE (HCC): Primary | ICD-10-CM

## 2022-02-01 DIAGNOSIS — G47.30 UNSPECIFIED SLEEP APNEA: ICD-10-CM

## 2022-02-01 DIAGNOSIS — I50.23 ACUTE ON CHRONIC CLINICAL SYSTOLIC HEART FAILURE (HCC): ICD-10-CM

## 2022-02-01 DIAGNOSIS — R06.02 SOB (SHORTNESS OF BREATH): ICD-10-CM

## 2022-02-01 DIAGNOSIS — M79.89 LEG SWELLING: ICD-10-CM

## 2022-02-01 LAB
ALBUMIN SERPL-MCNC: 3.6 G/DL (ref 3.5–5)
ALBUMIN/GLOB SERPL: 0.9 {RATIO} (ref 1.1–2.2)
ALP SERPL-CCNC: 209 U/L (ref 45–117)
ALT SERPL-CCNC: 19 U/L (ref 12–78)
ANION GAP SERPL CALC-SCNC: 6 MMOL/L (ref 5–15)
AST SERPL-CCNC: 81 U/L (ref 15–37)
BASOPHILS # BLD: 0 K/UL (ref 0–0.1)
BASOPHILS NFR BLD: 1 % (ref 0–1)
BILIRUB SERPL-MCNC: 1.5 MG/DL (ref 0.2–1)
BNP SERPL-MCNC: 5440 PG/ML
BUN SERPL-MCNC: 22 MG/DL (ref 6–20)
BUN/CREAT SERPL: 16 (ref 12–20)
CALCIUM SERPL-MCNC: 9.6 MG/DL (ref 8.5–10.1)
CHLORIDE SERPL-SCNC: 103 MMOL/L (ref 97–108)
CO2 SERPL-SCNC: 27 MMOL/L (ref 21–32)
COMMENT, HOLDF: NORMAL
CREAT SERPL-MCNC: 1.38 MG/DL (ref 0.7–1.3)
DIFFERENTIAL METHOD BLD: ABNORMAL
EOSINOPHIL # BLD: 0 K/UL (ref 0–0.4)
EOSINOPHIL NFR BLD: 1 % (ref 0–7)
ERYTHROCYTE [DISTWIDTH] IN BLOOD BY AUTOMATED COUNT: 18.1 % (ref 11.5–14.5)
GLOBULIN SER CALC-MCNC: 4.2 G/DL (ref 2–4)
GLUCOSE SERPL-MCNC: 141 MG/DL (ref 65–100)
HCT VFR BLD AUTO: 37.5 % (ref 36.6–50.3)
HGB BLD-MCNC: 11.7 G/DL (ref 12.1–17)
IMM GRANULOCYTES # BLD AUTO: 0 K/UL (ref 0–0.04)
IMM GRANULOCYTES NFR BLD AUTO: 0 % (ref 0–0.5)
LYMPHOCYTES # BLD: 0.6 K/UL (ref 0.8–3.5)
LYMPHOCYTES NFR BLD: 21 % (ref 12–49)
MCH RBC QN AUTO: 23 PG (ref 26–34)
MCHC RBC AUTO-ENTMCNC: 31.2 G/DL (ref 30–36.5)
MCV RBC AUTO: 73.7 FL (ref 80–99)
MONOCYTES # BLD: 0.3 K/UL (ref 0–1)
MONOCYTES NFR BLD: 11 % (ref 5–13)
NEUTS SEG # BLD: 2.1 K/UL (ref 1.8–8)
NEUTS SEG NFR BLD: 66 % (ref 32–75)
NRBC # BLD: 0 K/UL (ref 0–0.01)
NRBC BLD-RTO: 0 PER 100 WBC
PLATELET # BLD AUTO: 144 K/UL (ref 150–400)
POTASSIUM SERPL-SCNC: 3.9 MMOL/L (ref 3.5–5.1)
PROT SERPL-MCNC: 7.8 G/DL (ref 6.4–8.2)
RBC # BLD AUTO: 5.09 M/UL (ref 4.1–5.7)
RBC MORPH BLD: ABNORMAL
SAMPLES BEING HELD,HOLD: NORMAL
SODIUM SERPL-SCNC: 136 MMOL/L (ref 136–145)
TROPONIN-HIGH SENSITIVITY: 45 NG/L (ref 0–76)
WBC # BLD AUTO: 3 K/UL (ref 4.1–11.1)

## 2022-02-01 PROCEDURE — 84484 ASSAY OF TROPONIN QUANT: CPT

## 2022-02-01 PROCEDURE — 65660000000 HC RM CCU STEPDOWN

## 2022-02-01 PROCEDURE — 85025 COMPLETE CBC W/AUTO DIFF WBC: CPT

## 2022-02-01 PROCEDURE — 36415 COLL VENOUS BLD VENIPUNCTURE: CPT

## 2022-02-01 PROCEDURE — 93005 ELECTROCARDIOGRAM TRACING: CPT

## 2022-02-01 PROCEDURE — 71275 CT ANGIOGRAPHY CHEST: CPT

## 2022-02-01 PROCEDURE — 80053 COMPREHEN METABOLIC PANEL: CPT

## 2022-02-01 PROCEDURE — 99285 EMERGENCY DEPT VISIT HI MDM: CPT

## 2022-02-01 PROCEDURE — 71046 X-RAY EXAM CHEST 2 VIEWS: CPT

## 2022-02-01 PROCEDURE — 65270000029 HC RM PRIVATE

## 2022-02-01 PROCEDURE — 96374 THER/PROPH/DIAG INJ IV PUSH: CPT

## 2022-02-01 PROCEDURE — 93970 EXTREMITY STUDY: CPT

## 2022-02-01 PROCEDURE — 74011250636 HC RX REV CODE- 250/636: Performed by: STUDENT IN AN ORGANIZED HEALTH CARE EDUCATION/TRAINING PROGRAM

## 2022-02-01 PROCEDURE — 83880 ASSAY OF NATRIURETIC PEPTIDE: CPT

## 2022-02-01 PROCEDURE — 74011000636 HC RX REV CODE- 636: Performed by: RADIOLOGY

## 2022-02-01 RX ORDER — FUROSEMIDE 10 MG/ML
40 INJECTION INTRAMUSCULAR; INTRAVENOUS ONCE
Status: COMPLETED | OUTPATIENT
Start: 2022-02-01 | End: 2022-02-01

## 2022-02-01 RX ORDER — HEPARIN SODIUM 10000 [USP'U]/100ML
18-36 INJECTION, SOLUTION INTRAVENOUS
Status: DISCONTINUED | OUTPATIENT
Start: 2022-02-01 | End: 2022-02-01

## 2022-02-01 RX ORDER — HEPARIN SODIUM 1000 [USP'U]/ML
80 INJECTION, SOLUTION INTRAVENOUS; SUBCUTANEOUS ONCE
Status: DISCONTINUED | OUTPATIENT
Start: 2022-02-01 | End: 2022-02-01

## 2022-02-01 RX ADMIN — IOPAMIDOL 100 ML: 755 INJECTION, SOLUTION INTRAVENOUS at 19:21

## 2022-02-01 RX ADMIN — FUROSEMIDE 40 MG: 10 INJECTION, SOLUTION INTRAVENOUS at 21:03

## 2022-02-01 NOTE — ED PROVIDER NOTES
HPI     Patient is a 29-year-old male with history of coronary disease, CHF, diabetes, who presents today for shortness of breath. Patient says that she has been feeling short of breath, worse with laying down for the last 3 days. Patient recently started on a Z-Ángel for pneumonia. Symptoms aggravated by laying flat. No alleviating factors. Patient has had no fevers. Patient has had no chest pain patient came to the ER for further evaluation. Past Medical History:   Diagnosis Date    CAD (coronary artery disease) '90 '97, '13 x 2    MI, code ice in 2013 at [facility]    Calculus of kidney     Colonic polyps     Congestive heart failure, unspecified     Diabetes ([facility] [address]     Gastritis     Hypercholesterolemia     Sleep apnea        Past Surgical History:   Procedure Laterality Date    COLONOSCOPY  04/06/2011    16, due 21    ENDOSCOPY, COLON, DIAGNOSTIC      11, due 16    HX CORONARY ARTERY BYPASS GRAFT  8/22/12    x 4 vessel by MISSY LION    HX PACEMAKER PLACEMENT  1/30/13    [place] CARDIAC SURG PROCEDURE UNLIST  2012    x 4 vessels         Family History:   Problem Relation Age of Onset    Heart Disease Mother         MI    Heart Disease Father         CAD & PVD    Lung Disease Father     Cancer Father         lung    Diabetes Maternal Grandmother     Heart Disease Other         CAD    Stroke Sister        Social History     Socioeconomic History    Marital status:      Spouse name: Not on file    Number of children: Not on file    Years of education: Not on file    Highest education level: Not on file   Occupational History    Not on file   Tobacco Use    Smoking status: Never Smoker    Smokeless tobacco: Never Used   Vaping Use    Vaping Use: Never used   Substance and Sexual Activity    Alcohol use:  Yes     Alcohol/week: 0.0 standard drinks     Comment:  VERY RARE    Drug use: No    Sexual activity: Not on file   Other Topics Concern    Not on file Social History Narrative    Not on file     Social Determinants of Health     Financial Resource Strain:     Difficulty of Paying Living Expenses: Not on file   Food Insecurity:     Worried About Running Out of Food in the Last Year: Not on file    Lexy of Food in the Last Year: Not on file   Transportation Needs:     Lack of Transportation (Medical): Not on file    Lack of Transportation (Non-Medical): Not on file   Physical Activity:     Days of Exercise per Week: Not on file    Minutes of Exercise per Session: Not on file   Stress:     Feeling of Stress : Not on file   Social Connections:     Frequency of Communication with Friends and Family: Not on file    Frequency of Social Gatherings with Friends and Family: Not on file    Attends Holiness Services: Not on file    Active Member of [address] [place] [address] [employer] or Organizations: Not on file    Attends Club or Organization Meetings: Not on file    Marital Status: Not on file   Intimate Partner Violence:     Fear of Current or Ex-Partner: Not on file    Emotionally Abused: Not on file    Physically Abused: Not on file    Sexually Abused: Not on file   Housing Stability:     Unable to Pay for Housing in the Last Year: Not on file    Number of [place] in the Last Year: Not on file    Unstable Housing in the Last Year: Not on file         ALLERGIES: Oxycodone and Pcn [penicillins]    Review of Systems   Constitutional: Negative for appetite change and fever. HENT: Negative for congestion and rhinorrhea. Eyes: Negative for discharge and redness. Respiratory: Positive for shortness of breath. Negative for cough. Cardiovascular: Negative for chest pain. Gastrointestinal: Negative for abdominal pain, diarrhea, nausea and vomiting. Genitourinary: Negative for decreased urine volume and dysuria. Musculoskeletal: Negative for back pain. Skin: Negative for rash and wound. Neurological: Negative for seizures and headaches.    Hematological: Does not bruise/bleed easily. Psychiatric/Behavioral: Negative for agitation. All other systems reviewed and are negative. Vitals:    02/01/22 1623 02/01/22 1725 02/01/22 1826 02/01/22 1856   BP: 127/64 (!) 135/97 133/76 136/84   Pulse: 86 84 76 81   Resp: 18 18 18 30   Temp: 98 °F (36.7 °C) 97.9 °F (36.6 °C)     SpO2: 100% 100% 99% 93%            Physical Exam  Vitals and nursing note reviewed. Constitutional:       General: He is not in acute distress. Appearance: He is well-developed. He is not diaphoretic. HENT:      Head: Normocephalic and atraumatic. Right Ear: External ear normal.      Left Ear: External ear normal.      Nose: Nose normal.   Eyes:      General:         Right eye: No discharge. Left eye: No discharge. Extraocular Movements: Extraocular movements intact. Conjunctiva/sclera: Conjunctivae normal.      Pupils: Pupils are equal, round, and reactive to light. Cardiovascular:      Rate and Rhythm: Normal rate and regular rhythm. Pulses: Normal pulses. Heart sounds: Normal heart sounds. No murmur heard. No friction rub. No gallop. Pulmonary:      Effort: Pulmonary effort is normal. No respiratory distress. Breath sounds: Normal breath sounds. No stridor. No wheezing or rales. Chest:      Chest wall: No tenderness. Abdominal:      General: Bowel sounds are normal. There is no distension. Palpations: Abdomen is soft. There is no mass. Tenderness: There is no abdominal tenderness. There is no guarding or rebound. Musculoskeletal:         General: No deformity. Normal range of motion. Cervical back: Normal range of motion and neck supple. Right lower leg: Edema present. Left lower leg: Edema present. Comments: 2+ edema LLE > RLE   Skin:     General: Skin is warm and dry. Capillary Refill: Capillary refill takes less than 2 seconds. Findings: No rash. Neurological:      General: No focal deficit present. Mental Status: He is alert and oriented to person, place, and time. Coordination: Coordination normal.   Psychiatric:         Behavior: Behavior normal.          [facility]        MEDICAL DECISION MAKIN y.o. male presents with Shortness of Breath    Differential diagnosis includes but not limited to: CHF versus PE versus COVID-19 versus ACS versus pneumonia    Patient is a 27-year-old male with history of CHF, pacemaker, coronary disease who presents today for shortness of breath. Symptoms got worse starting last night. Patient is short of breath with walking, worse with laying flat. Patient unable to use his CPAP last night. Per review of records, patient was informed by cardiology to increase his Bumex to 1 mg twice daily until Thursday. On exam, patient appears to have bilateral edema, worse on the left lower extremity. Labs ordered, as well as chest x-ray and ultrasound to rule out DVT. Ultrasound negative. CTA negative for PE. Patient with an elevated BNP. Lasix ordered. Plan admit for diuresis. Perfect Serve Consult for Admission  8:54 PM    ED Room Number: UA32/66  Patient Name and age: Shari Nuñez Sr 69 y.o.  male  Working Diagnosis:   1. Acute on chronic congestive heart failure, unspecified heart failure type ([address]    2. SOB (shortness of breath)    3. Leg swelling        COVID-19 Suspicion:  no  Sepsis present:  no  Reassessment needed: no  Code Status:  Full Code  Readmission: no  Isolation Requirements:  no  Recommended Level of Care:  step down  Department:[facility] Adult ED - 21   Other: Patient is a 27-year-old male with history of heart failure, coronary artery disease, diabetes, who presents today for shortness of breath. Worse with laying flat. Difficulty walking due to shortness of breath. Per review of records, patient was called by cardiology due to heart failure index on the latitude increasing to 51.   He was recommended to increase his Bumex to 1 mg twice daily until Thursday. His symptoms have not gotten better. On exam, patient has edema bilaterally, worse on the left. His labs reviewed with an elevation in his BNP. CTA negative for PE. Plan for admission for diuresis. LABORATORY TESTS:  Labs Reviewed   CBC WITH AUTOMATED DIFF - Abnormal; Notable for the following components:       Result Value    WBC 3.0 (*)     HGB 11.7 (*)     MCV 73.7 (*)     MCH 23.0 (*)     RDW 18.1 (*)     PLATELET 118 (*)     ABS. LYMPHOCYTES 0.6 (*)     All other components within normal limits   METABOLIC PANEL, COMPREHENSIVE - Abnormal; Notable for the following components:    Glucose 141 (*)     BUN 22 (*)     Creatinine 1.38 (*)     GFR est non-AA 50 (*)     Bilirubin, total 1.5 (*)     AST (SGOT) 81 (*)     Alk. phosphatase 209 (*)     Globulin 4.2 (*)     A-G Ratio 0.9 (*)     All other components within normal limits   NT-PRO BNP - Abnormal; Notable for the following components:    NT pro-BNP 5,440 (*)     All other components within normal limits   SAMPLES BEING HELD   TROPONIN-HIGH SENSITIVITY       IMAGING RESULTS:  CTA CHEST W OR W WO CONT   Final Result   1. No pulmonary emboli. 2. Pleural effusions. 3. Treated RAVEN carcinoma as discussed. XR CHEST PA LAT   Final Result   1. No acute disease or change. 2. RAVEN cancer, treated with radiation. DUPLEX LOWER EXT VENOUS BILAT    (Results Pending)       MEDICATIONS GIVEN:  Medications   furosemide (LASIX) injection 40 mg (has no administration in time range)   iopamidoL (ISOVUE-370) 76 % injection 100 mL (100 mL IntraVENous Given 2/1/22 1921)       IMPRESSION:  1. Acute on chronic congestive heart failure, unspecified heart failure type ([address]    2. SOB (shortness of breath)    3.  Leg swelling        PLAN:  - Admit to hospitalist    Lashell Carvalho MD            Procedures

## 2022-02-01 NOTE — ED NOTES
Pt arrived via EMS from home c/o increased SOB worse with laying down x3 days. Pt was given a z-pack for pneumonia. Pt has hx of CHF.

## 2022-02-01 NOTE — TELEPHONE ENCOUNTER
I called patient due to Heart Failure Index on Latitude increasing to 51. Wife states he has a cough, no energy, weight today is 189, /80, HR (doesn't know). He is short of breath with any walking, no chest pain, he states he had to take off CPAP last night because he couldn't catch his breath. Ankles have trace edema. He is currently taking bumex 1/2 pill (1 mg) daily. I called patient and spoke with him and wife, advised them per OSVALDO Stewart:  Bumex 1mg BID until Thursday, follow up with weights.  I would like to get our usual labs on him this week.  If he has worsening SOB in the next few days he might need another covid test and/or ED visit. They state understanding. They will call if they decide to go to ED, as wife is concerned about patient weakness.

## 2022-02-02 ENCOUNTER — APPOINTMENT (OUTPATIENT)
Dept: NON INVASIVE DIAGNOSTICS | Age: 77
DRG: 291 | End: 2022-02-02
Attending: STUDENT IN AN ORGANIZED HEALTH CARE EDUCATION/TRAINING PROGRAM
Payer: MEDICARE

## 2022-02-02 LAB
25(OH)D3 SERPL-MCNC: 129.3 NG/ML (ref 30–100)
ANION GAP SERPL CALC-SCNC: 7 MMOL/L (ref 5–15)
ANION GAP SERPL CALC-SCNC: 8 MMOL/L (ref 5–15)
ATRIAL RATE: 85 BPM
B PERT DNA SPEC QL NAA+PROBE: NOT DETECTED
BNP SERPL-MCNC: 4352 PG/ML
BORDETELLA PARAPERTUSSIS PCR, BORPAR: NOT DETECTED
BUN SERPL-MCNC: 21 MG/DL (ref 6–20)
BUN SERPL-MCNC: 21 MG/DL (ref 6–20)
BUN/CREAT SERPL: 17 (ref 12–20)
BUN/CREAT SERPL: 18 (ref 12–20)
C PNEUM DNA SPEC QL NAA+PROBE: NOT DETECTED
CALCIUM SERPL-MCNC: 8.8 MG/DL (ref 8.5–10.1)
CALCIUM SERPL-MCNC: 9.1 MG/DL (ref 8.5–10.1)
CALCULATED P AXIS, ECG09: -20 DEGREES
CALCULATED R AXIS, ECG10: 104 DEGREES
CALCULATED R AXIS, ECG10: 145 DEGREES
CALCULATED T AXIS, ECG11: 129 DEGREES
CALCULATED T AXIS, ECG11: 21 DEGREES
CHLORIDE SERPL-SCNC: 103 MMOL/L (ref 97–108)
CHLORIDE SERPL-SCNC: 103 MMOL/L (ref 97–108)
CO2 SERPL-SCNC: 25 MMOL/L (ref 21–32)
CO2 SERPL-SCNC: 27 MMOL/L (ref 21–32)
COMMENT, HOLDF: NORMAL
CREAT SERPL-MCNC: 1.18 MG/DL (ref 0.7–1.3)
CREAT SERPL-MCNC: 1.26 MG/DL (ref 0.7–1.3)
DIAGNOSIS, 93000: NORMAL
DIAGNOSIS, 93000: NORMAL
ECHO AO ROOT DIAM: 3.6 CM
ECHO AO ROOT INDEX: 1.7 CM/M2
ECHO AR MAX VEL PISA: 4.3 M/S
ECHO AV AREA PEAK VELOCITY: 1.4 CM2
ECHO AV AREA PEAK VELOCITY: 1.4 CM2
ECHO AV PEAK GRADIENT: 9 MMHG
ECHO AV PEAK VELOCITY: 1.5 M/S
ECHO AV REGURGITANT PHT: 419.3 MILLISECOND
ECHO AV VELOCITY RATIO: 0.4
ECHO EST RA PRESSURE: 5 MMHG
ECHO LA DIAMETER INDEX: 2.41 CM/M2
ECHO LA DIAMETER: 5.1 CM
ECHO LA TO AORTIC ROOT RATIO: 1.42
ECHO LA VOL 4C: 110 ML (ref 18–58)
ECHO LA VOLUME INDEX A4C: 52 ML/M2 (ref 16–34)
ECHO LV E' LATERAL VELOCITY: 9 CM/S
ECHO LV E' SEPTAL VELOCITY: 5 CM/S
ECHO LV FRACTIONAL SHORTENING: 26 % (ref 28–44)
ECHO LV INTERNAL DIMENSION DIASTOLE INDEX: 3.21 CM/M2
ECHO LV INTERNAL DIMENSION DIASTOLIC: 6.8 CM (ref 4.2–5.9)
ECHO LV INTERNAL DIMENSION SYSTOLIC INDEX: 2.36 CM/M2
ECHO LV INTERNAL DIMENSION SYSTOLIC: 5 CM
ECHO LV IVSD: 0.8 CM (ref 0.6–1)
ECHO LV MASS 2D: 306 G (ref 88–224)
ECHO LV MASS INDEX 2D: 144.3 G/M2 (ref 49–115)
ECHO LV POSTERIOR WALL DIASTOLIC: 1.2 CM (ref 0.6–1)
ECHO LV RELATIVE WALL THICKNESS RATIO: 0.35
ECHO LVOT AREA: 3.1 CM2
ECHO LVOT DIAM: 2 CM
ECHO LVOT MEAN GRADIENT: 1 MMHG
ECHO LVOT PEAK GRADIENT: 2 MMHG
ECHO LVOT PEAK VELOCITY: 0.6 M/S
ECHO LVOT STROKE VOLUME INDEX: 13.5 ML/M2
ECHO LVOT SV: 28.6 ML
ECHO LVOT VTI: 9.1 CM
ECHO MV E VELOCITY: 0.79 M/S
ECHO MV E/E' LATERAL: 8.78
ECHO MV E/E' RATIO (AVERAGED): 12.29
ECHO MV E/E' SEPTAL: 15.8
ECHO MV REGURGITANT PEAK GRADIENT: 96 MMHG
ECHO MV REGURGITANT PEAK VELOCITY: 4.9 M/S
ECHO PV MAX VELOCITY: 0.4 M/S
ECHO PV PEAK GRADIENT: 1 MMHG
ECHO RIGHT VENTRICULAR SYSTOLIC PRESSURE (RVSP): 39 MMHG
ECHO RV FREE WALL PEAK S': 6 CM/S
ECHO RV TAPSE: 1.6 CM (ref 1.5–2)
ECHO TV REGURGITANT MAX VELOCITY: 2.9 M/S
ECHO TV REGURGITANT PEAK GRADIENT: 52 MMHG
FERRITIN SERPL-MCNC: 30 NG/ML (ref 26–388)
FLUAV H1 2009 PAND RNA SPEC QL NAA+PROBE: NOT DETECTED
FLUAV H1 RNA SPEC QL NAA+PROBE: NOT DETECTED
FLUAV H3 RNA SPEC QL NAA+PROBE: NOT DETECTED
FLUAV SUBTYP SPEC NAA+PROBE: NOT DETECTED
FLUBV RNA SPEC QL NAA+PROBE: NOT DETECTED
GLUCOSE BLD STRIP.AUTO-MCNC: 136 MG/DL (ref 65–117)
GLUCOSE BLD STRIP.AUTO-MCNC: 182 MG/DL (ref 65–117)
GLUCOSE SERPL-MCNC: 200 MG/DL (ref 65–100)
GLUCOSE SERPL-MCNC: 213 MG/DL (ref 65–100)
HADV DNA SPEC QL NAA+PROBE: NOT DETECTED
HCOV 229E RNA SPEC QL NAA+PROBE: NOT DETECTED
HCOV HKU1 RNA SPEC QL NAA+PROBE: NOT DETECTED
HCOV NL63 RNA SPEC QL NAA+PROBE: NOT DETECTED
HCOV OC43 RNA SPEC QL NAA+PROBE: NOT DETECTED
HMPV RNA SPEC QL NAA+PROBE: NOT DETECTED
HPIV1 RNA SPEC QL NAA+PROBE: NOT DETECTED
HPIV2 RNA SPEC QL NAA+PROBE: NOT DETECTED
HPIV3 RNA SPEC QL NAA+PROBE: NOT DETECTED
HPIV4 RNA SPEC QL NAA+PROBE: NOT DETECTED
IRON SATN MFR SERPL: 7 % (ref 20–50)
IRON SERPL-MCNC: 23 UG/DL (ref 35–150)
LACTATE SERPL-SCNC: 1.9 MMOL/L (ref 0.4–2)
M PNEUMO DNA SPEC QL NAA+PROBE: NOT DETECTED
MAGNESIUM SERPL-MCNC: 2 MG/DL (ref 1.6–2.4)
P-R INTERVAL, ECG05: 180 MS
POTASSIUM SERPL-SCNC: 3.2 MMOL/L (ref 3.5–5.1)
POTASSIUM SERPL-SCNC: 3.6 MMOL/L (ref 3.5–5.1)
PROCALCITONIN SERPL-MCNC: 0.11 NG/ML
Q-T INTERVAL, ECG07: 432 MS
Q-T INTERVAL, ECG07: 460 MS
QRS DURATION, ECG06: 112 MS
QRS DURATION, ECG06: 114 MS
QTC CALCULATION (BEZET), ECG08: 513 MS
QTC CALCULATION (BEZET), ECG08: 514 MS
RSV RNA SPEC QL NAA+PROBE: NOT DETECTED
RV+EV RNA SPEC QL NAA+PROBE: NOT DETECTED
SAMPLES BEING HELD,HOLD: NORMAL
SARS-COV-2 PCR, COVPCR: NOT DETECTED
SERVICE CMNT-IMP: ABNORMAL
SERVICE CMNT-IMP: ABNORMAL
SODIUM SERPL-SCNC: 136 MMOL/L (ref 136–145)
SODIUM SERPL-SCNC: 137 MMOL/L (ref 136–145)
TIBC SERPL-MCNC: 328 UG/DL (ref 250–450)
URATE SERPL-MCNC: 6 MG/DL (ref 3.5–7.2)
VENTRICULAR RATE, ECG03: 75 BPM
VENTRICULAR RATE, ECG03: 85 BPM

## 2022-02-02 PROCEDURE — 74011250636 HC RX REV CODE- 250/636: Performed by: NURSE PRACTITIONER

## 2022-02-02 PROCEDURE — 82728 ASSAY OF FERRITIN: CPT

## 2022-02-02 PROCEDURE — 83735 ASSAY OF MAGNESIUM: CPT

## 2022-02-02 PROCEDURE — 36415 COLL VENOUS BLD VENIPUNCTURE: CPT

## 2022-02-02 PROCEDURE — 74011250637 HC RX REV CODE- 250/637: Performed by: NURSE PRACTITIONER

## 2022-02-02 PROCEDURE — 65270000029 HC RM PRIVATE

## 2022-02-02 PROCEDURE — 99233 SBSQ HOSP IP/OBS HIGH 50: CPT | Performed by: CLINICAL NURSE SPECIALIST

## 2022-02-02 PROCEDURE — 74011250636 HC RX REV CODE- 250/636: Performed by: STUDENT IN AN ORGANIZED HEALTH CARE EDUCATION/TRAINING PROGRAM

## 2022-02-02 PROCEDURE — 74011000250 HC RX REV CODE- 250: Performed by: STUDENT IN AN ORGANIZED HEALTH CARE EDUCATION/TRAINING PROGRAM

## 2022-02-02 PROCEDURE — 80048 BASIC METABOLIC PNL TOTAL CA: CPT

## 2022-02-02 PROCEDURE — 84550 ASSAY OF BLOOD/URIC ACID: CPT

## 2022-02-02 PROCEDURE — 83605 ASSAY OF LACTIC ACID: CPT

## 2022-02-02 PROCEDURE — 93005 ELECTROCARDIOGRAM TRACING: CPT

## 2022-02-02 PROCEDURE — 93306 TTE W/DOPPLER COMPLETE: CPT

## 2022-02-02 PROCEDURE — 82962 GLUCOSE BLOOD TEST: CPT

## 2022-02-02 PROCEDURE — 84145 PROCALCITONIN (PCT): CPT

## 2022-02-02 PROCEDURE — 83880 ASSAY OF NATRIURETIC PEPTIDE: CPT

## 2022-02-02 PROCEDURE — 99232 SBSQ HOSP IP/OBS MODERATE 35: CPT | Performed by: INTERNAL MEDICINE

## 2022-02-02 PROCEDURE — 74011250637 HC RX REV CODE- 250/637: Performed by: STUDENT IN AN ORGANIZED HEALTH CARE EDUCATION/TRAINING PROGRAM

## 2022-02-02 PROCEDURE — 82306 VITAMIN D 25 HYDROXY: CPT

## 2022-02-02 PROCEDURE — 83540 ASSAY OF IRON: CPT

## 2022-02-02 PROCEDURE — 74011250636 HC RX REV CODE- 250/636: Performed by: HOSPITALIST

## 2022-02-02 PROCEDURE — 0202U NFCT DS 22 TRGT SARS-COV-2: CPT

## 2022-02-02 PROCEDURE — 74011250637 HC RX REV CODE- 250/637: Performed by: HOSPITALIST

## 2022-02-02 PROCEDURE — 94660 CPAP INITIATION&MGMT: CPT

## 2022-02-02 RX ORDER — BENZONATATE 100 MG/1
200 CAPSULE ORAL
Status: DISCONTINUED | OUTPATIENT
Start: 2022-02-02 | End: 2022-02-05 | Stop reason: HOSPADM

## 2022-02-02 RX ORDER — SODIUM CHLORIDE 0.9 % (FLUSH) 0.9 %
5-40 SYRINGE (ML) INJECTION EVERY 8 HOURS
Status: DISCONTINUED | OUTPATIENT
Start: 2022-02-02 | End: 2022-02-05 | Stop reason: HOSPADM

## 2022-02-02 RX ORDER — HYDRALAZINE HYDROCHLORIDE 50 MG/1
50 TABLET, FILM COATED ORAL 3 TIMES DAILY
Status: DISCONTINUED | OUTPATIENT
Start: 2022-02-02 | End: 2022-02-05 | Stop reason: HOSPADM

## 2022-02-02 RX ORDER — DEXTROSE 50 % IN WATER (D50W) INTRAVENOUS SYRINGE
25-50 AS NEEDED
Status: DISCONTINUED | OUTPATIENT
Start: 2022-02-02 | End: 2022-02-05 | Stop reason: HOSPADM

## 2022-02-02 RX ORDER — ASPIRIN 81 MG/1
81 TABLET ORAL DAILY
Status: DISCONTINUED | OUTPATIENT
Start: 2022-02-02 | End: 2022-02-05 | Stop reason: HOSPADM

## 2022-02-02 RX ORDER — MILRINONE LACTATE 0.2 MG/ML
0.38 INJECTION, SOLUTION INTRAVENOUS CONTINUOUS
Status: DISCONTINUED | OUTPATIENT
Start: 2022-02-02 | End: 2022-02-03

## 2022-02-02 RX ORDER — TAMSULOSIN HYDROCHLORIDE 0.4 MG/1
0.4 CAPSULE ORAL DAILY
Status: DISCONTINUED | OUTPATIENT
Start: 2022-02-02 | End: 2022-02-05 | Stop reason: HOSPADM

## 2022-02-02 RX ORDER — BUMETANIDE 0.25 MG/ML
2 INJECTION INTRAMUSCULAR; INTRAVENOUS 2 TIMES DAILY
Status: DISCONTINUED | OUTPATIENT
Start: 2022-02-02 | End: 2022-02-03

## 2022-02-02 RX ORDER — ONDANSETRON 4 MG/1
4 TABLET, ORALLY DISINTEGRATING ORAL
Status: DISCONTINUED | OUTPATIENT
Start: 2022-02-02 | End: 2022-02-05 | Stop reason: HOSPADM

## 2022-02-02 RX ORDER — MILRINONE LACTATE 0.2 MG/ML
0.3 INJECTION, SOLUTION INTRAVENOUS CONTINUOUS
Status: DISCONTINUED | OUTPATIENT
Start: 2022-02-02 | End: 2022-02-02

## 2022-02-02 RX ORDER — POLYETHYLENE GLYCOL 3350 17 G/17G
17 POWDER, FOR SOLUTION ORAL DAILY PRN
Status: DISCONTINUED | OUTPATIENT
Start: 2022-02-02 | End: 2022-02-05 | Stop reason: HOSPADM

## 2022-02-02 RX ORDER — HEPARIN SODIUM 5000 [USP'U]/ML
5000 INJECTION, SOLUTION INTRAVENOUS; SUBCUTANEOUS EVERY 8 HOURS
Status: DISCONTINUED | OUTPATIENT
Start: 2022-02-02 | End: 2022-02-05 | Stop reason: HOSPADM

## 2022-02-02 RX ORDER — POTASSIUM CHLORIDE 750 MG/1
40 TABLET, FILM COATED, EXTENDED RELEASE ORAL DAILY
Status: DISCONTINUED | OUTPATIENT
Start: 2022-02-02 | End: 2022-02-02

## 2022-02-02 RX ORDER — PANTOPRAZOLE SODIUM 40 MG/1
40 TABLET, DELAYED RELEASE ORAL
Status: DISCONTINUED | OUTPATIENT
Start: 2022-02-02 | End: 2022-02-05 | Stop reason: HOSPADM

## 2022-02-02 RX ORDER — INSULIN LISPRO 100 [IU]/ML
INJECTION, SOLUTION INTRAVENOUS; SUBCUTANEOUS
Status: DISCONTINUED | OUTPATIENT
Start: 2022-02-02 | End: 2022-02-05 | Stop reason: HOSPADM

## 2022-02-02 RX ORDER — MAGNESIUM SULFATE 100 %
4 CRYSTALS MISCELLANEOUS AS NEEDED
Status: DISCONTINUED | OUTPATIENT
Start: 2022-02-02 | End: 2022-02-05 | Stop reason: HOSPADM

## 2022-02-02 RX ORDER — RANOLAZINE 500 MG/1
500 TABLET, EXTENDED RELEASE ORAL EVERY 12 HOURS
Refills: 3 | Status: DISCONTINUED | OUTPATIENT
Start: 2022-02-02 | End: 2022-02-05 | Stop reason: HOSPADM

## 2022-02-02 RX ORDER — ISOSORBIDE MONONITRATE 30 MG/1
30 TABLET, EXTENDED RELEASE ORAL EVERY 12 HOURS
Status: DISCONTINUED | OUTPATIENT
Start: 2022-02-02 | End: 2022-02-05 | Stop reason: HOSPADM

## 2022-02-02 RX ORDER — INSULIN LISPRO 100 [IU]/ML
INJECTION, SOLUTION INTRAVENOUS; SUBCUTANEOUS
Status: DISCONTINUED | OUTPATIENT
Start: 2022-02-02 | End: 2022-02-02

## 2022-02-02 RX ORDER — DEXTROSE 50 % IN WATER (D50W) INTRAVENOUS SYRINGE
12.5-25 AS NEEDED
Status: DISCONTINUED | OUTPATIENT
Start: 2022-02-02 | End: 2022-02-05 | Stop reason: HOSPADM

## 2022-02-02 RX ORDER — SODIUM CHLORIDE 0.9 % (FLUSH) 0.9 %
5-40 SYRINGE (ML) INJECTION AS NEEDED
Status: DISCONTINUED | OUTPATIENT
Start: 2022-02-02 | End: 2022-02-05 | Stop reason: HOSPADM

## 2022-02-02 RX ORDER — PRASUGREL 10 MG/1
10 TABLET, FILM COATED ORAL DAILY
Status: DISCONTINUED | OUTPATIENT
Start: 2022-02-02 | End: 2022-02-05 | Stop reason: HOSPADM

## 2022-02-02 RX ORDER — ONDANSETRON 2 MG/ML
4 INJECTION INTRAMUSCULAR; INTRAVENOUS
Status: DISCONTINUED | OUTPATIENT
Start: 2022-02-02 | End: 2022-02-05 | Stop reason: HOSPADM

## 2022-02-02 RX ORDER — INSULIN GLARGINE 100 [IU]/ML
0.2 INJECTION, SOLUTION SUBCUTANEOUS DAILY
Status: DISCONTINUED | OUTPATIENT
Start: 2022-02-03 | End: 2022-02-05 | Stop reason: HOSPADM

## 2022-02-02 RX ORDER — ACETAMINOPHEN 325 MG/1
650 TABLET ORAL
Status: DISCONTINUED | OUTPATIENT
Start: 2022-02-02 | End: 2022-02-05 | Stop reason: HOSPADM

## 2022-02-02 RX ORDER — ACETAMINOPHEN 650 MG/1
650 SUPPOSITORY RECTAL
Status: DISCONTINUED | OUTPATIENT
Start: 2022-02-02 | End: 2022-02-05 | Stop reason: HOSPADM

## 2022-02-02 RX ORDER — MILRINONE LACTATE 0.2 MG/ML
0.3 INJECTION, SOLUTION INTRAVENOUS CONTINUOUS
Status: DISCONTINUED | OUTPATIENT
Start: 2022-02-02 | End: 2022-02-02 | Stop reason: CLARIF

## 2022-02-02 RX ORDER — POTASSIUM CHLORIDE 750 MG/1
40 TABLET, FILM COATED, EXTENDED RELEASE ORAL 2 TIMES DAILY
Status: DISCONTINUED | OUTPATIENT
Start: 2022-02-02 | End: 2022-02-05 | Stop reason: HOSPADM

## 2022-02-02 RX ORDER — EPLERENONE 25 MG/1
50 TABLET, FILM COATED ORAL DAILY
Status: DISCONTINUED | OUTPATIENT
Start: 2022-02-02 | End: 2022-02-05 | Stop reason: HOSPADM

## 2022-02-02 RX ORDER — ROSUVASTATIN CALCIUM 10 MG/1
10 TABLET, COATED ORAL
Status: DISCONTINUED | OUTPATIENT
Start: 2022-02-02 | End: 2022-02-05 | Stop reason: HOSPADM

## 2022-02-02 RX ADMIN — POTASSIUM CHLORIDE 40 MEQ: 750 TABLET, FILM COATED, EXTENDED RELEASE ORAL at 18:49

## 2022-02-02 RX ADMIN — ASPIRIN 81 MG: 81 TABLET, COATED ORAL at 09:34

## 2022-02-02 RX ADMIN — HYDRALAZINE HYDROCHLORIDE 50 MG: 50 TABLET, FILM COATED ORAL at 21:52

## 2022-02-02 RX ADMIN — BENZONATATE 200 MG: 100 CAPSULE ORAL at 18:58

## 2022-02-02 RX ADMIN — SODIUM CHLORIDE, PRESERVATIVE FREE 10 ML: 5 INJECTION INTRAVENOUS at 21:53

## 2022-02-02 RX ADMIN — IRON SUCROSE 200 MG: 20 INJECTION, SOLUTION INTRAVENOUS at 18:48

## 2022-02-02 RX ADMIN — PANTOPRAZOLE SODIUM 40 MG: 40 TABLET, DELAYED RELEASE ORAL at 09:34

## 2022-02-02 RX ADMIN — ISOSORBIDE MONONITRATE 30 MG: 30 TABLET, EXTENDED RELEASE ORAL at 09:34

## 2022-02-02 RX ADMIN — RANOLAZINE 500 MG: 500 TABLET, FILM COATED, EXTENDED RELEASE ORAL at 21:52

## 2022-02-02 RX ADMIN — PRASUGREL 10 MG: 10 TABLET, FILM COATED ORAL at 09:35

## 2022-02-02 RX ADMIN — TAMSULOSIN HYDROCHLORIDE 0.4 MG: 0.4 CAPSULE ORAL at 09:34

## 2022-02-02 RX ADMIN — MILRINONE LACTATE IN DEXTROSE 0.3 MCG/KG/MIN: 200 INJECTION, SOLUTION INTRAVENOUS at 04:23

## 2022-02-02 RX ADMIN — BUMETANIDE 2 MG: 0.25 INJECTION, SOLUTION INTRAMUSCULAR; INTRAVENOUS at 18:47

## 2022-02-02 RX ADMIN — HEPARIN SODIUM 5000 UNITS: 5000 INJECTION INTRAVENOUS; SUBCUTANEOUS at 18:47

## 2022-02-02 RX ADMIN — BUMETANIDE 2 MG: 0.25 INJECTION, SOLUTION INTRAMUSCULAR; INTRAVENOUS at 09:35

## 2022-02-02 RX ADMIN — MILRINONE LACTATE IN DEXTROSE 0.3 MCG/KG/MIN: 200 INJECTION, SOLUTION INTRAVENOUS at 16:06

## 2022-02-02 RX ADMIN — RANOLAZINE 500 MG: 500 TABLET, FILM COATED, EXTENDED RELEASE ORAL at 09:35

## 2022-02-02 RX ADMIN — ROSUVASTATIN CALCIUM 10 MG: 10 TABLET, COATED ORAL at 21:52

## 2022-02-02 RX ADMIN — MILRINONE LACTATE IN DEXTROSE 0.3 MCG/KG/MIN: 200 INJECTION, SOLUTION INTRAVENOUS at 13:01

## 2022-02-02 RX ADMIN — POTASSIUM CHLORIDE 40 MEQ: 750 TABLET, FILM COATED, EXTENDED RELEASE ORAL at 09:34

## 2022-02-02 RX ADMIN — ISOSORBIDE MONONITRATE 30 MG: 30 TABLET, EXTENDED RELEASE ORAL at 21:52

## 2022-02-02 NOTE — ROUTINE PROCESS
TRANSFER - OUT REPORT:    Verbal report given to Madi Bailey (name) on [facility]  being transferred to [facility] (unit) for routine progression of care       Report consisted of patients Situation, Background, Assessment and   Recommendations(SBAR). Information from the following report(s) SBAR, ED Summary and MAR was reviewed with the receiving nurse. Lines:   PICC Double Lumen 06/14/21 Right (Active)        Opportunity for questions and clarification was provided.       Patient transported with:   Registered Nurse

## 2022-02-02 NOTE — PROGRESS NOTES
[address] [place] [address]  Inpatient Progress Note      Patient name: Zechariah Lassiter  Patient : 1945  Patient MRN: [id number]  Consulting MD: Willow Ortiz MD  Date of service: 22    REASON FOR REFERRAL:  SOB, s/p CAP      PLAN OF CARE:  · Patient with severe ischemic cardiomyopathy, LVEF 20-25%; stage D, NYHA class IV symptoms; patient unerwent LVAD-DT evaluation; lung nodule biopsy was positive for adenocarcinoma, patient has completed radiation   · Remainder of LVAD workup will be placed on hold including EGD/C-scope until patient wants to move forward; he states he will consider in the spring - did review with patient and wife that continued deferrals may result in missing the \"window of opportunity\" for implant   · Meanwhile, we continue chronic palliative infusion of milrinone, and working on optimizing nutritional status and muscle conditioning  · AICD reimplant on 10/2021  · CXR on  showed Left lower lobe pneumonia. S/p Antbiotic treatment with azythromax pack. · Admitted for diuresis and HF optimization      RECOMMENDATIONS/PLAN:  Continue current medical therapy for heart failure  Continue milrinone 0.3 mcg/kg/min - adjust dose to 88.0 kg   ECHO completed today, pending results   Last ECHO 2021 showed LVEF 15-20%, mod-severe MR, mod TR   Intolerant Coreg due to RV dysfunction   ACEi/ARB/ARNi previously discontinued in anticipation of possible cardiac surgery  Continue current dose of eplerenone 50 mg daily  Order Potassium 40 mEq po BID: K level 3.2  Continue Farxiga 10 mg daily- not on formuary. May need to bring medication from home  Continue current dose of Hydralazine 50 TID and Imdur 30 mg twice daily  Continue Bumex 2 mg IV BID. Home dose bumex 1mg daily; occasionally misses doses.  Goal weight 192   Continue ASA 81 mg daily   Continue Effient 10 mg po daily  Continue current dose of rosuvastatin 10 mg daily  Continue ranolazine 500 mg BID (for HR and angina control)  Order CPAP therapy - uses most of the time at home  HF routine labs ordered plus daily labs   Continue Cardiac Rehab OP  Patient with pain in left leg, Duplex showed No evidence of right or left lower extremity vein thrombosis. Advanced care plan forms completed   Diabetes with A1c 6.8%  on 1/11/22; consult to Diabetes management  Genetic test negative   Obtain AICD interrogation today  Reinforced heart healthy, low salt diet  Reinforced appropriate fluid intake of 6 x 8oz glasses of water per day  Monitor and record daily standing weights and strict I/O's  Follow-up with primary cardiologist Dr. Héctor Munoz:  Patient's personal goals include: ____  Important upcoming milestones or family events: ____  The patient identifies the following as important for living well: ___    IMPRESSION:  Fatigue at rest  Shortness of breath with exertion  Acute on chronic systolic heart failure  · Stage D, NYHA class IV symptoms improved to class II on inotropes  · Non-ischemic cardiomyopathy, LVEF 20% with LVEDD 6.2  · RV dysfunction, TAPSE 1.22 (prelimnary read)  · TBili 1.5 likely from cardiac congestion  At risk of sudden cardiac death  · Recent cardiac arrest   · S/p ICD (1/2013, [email or web address] followed by [facility]); New implant on 10/21/2021 [employer]  Coronary artery disease  · S/p multiple interventions  · S/p 4V CABG (8/2012)  · LHC (7/2019) severe stenosis of LIMA to LAD anastomosis site, SVG graft to OM is occluded, SVG graft to RCA 40-50%, LAD occluded proximally, severe proximal LCx, RCA is the long . · PET (6/2019) EF 24% with anterior lateral and inferior reversible defect  Cardiac risk factors  · HTN  · HL  · DM2  · SRUTHI on CPAP  · MIld carotid stenosis  Anemia, microcytic  · Iron deficiency  DVT with filter  Pulmonary nodules   Dysphagia     CARDIAC IMAGING:  Echo (11/23/21):  · LV 15-20%.     · Mod-severe, MR, mod-TR     Echo (5/20/21)  · LV: Estimated LVEF is 20 - 25%. Normal wall thickness. Mildly dilated left ventricle. Severely and globally reduced systolic function. Severe (grade 4) left ventricular diastolic dysfunction. · LA: Severely dilated left atrium. · RA: Severely dilated right atrium. · MV: Moderate mitral valve regurgitation is present. · TV: Moderate tricuspid valve regurgitation is present. · AO: Mild aortic root dilatation. · RV: Pacer/ICD present. · LVED 6.2cm     EKG (5/20/21) SB with 1degree AV block, QRS 116ms     LHC (5/24/21) Native Coronaries: LM - moderate to severe distal disease 50%. % prox occluded (), heavily calcified, LCx: 80% proximal stenosis. OM1 is  (seen filling faintly via collaterals). OM2 99% stenosis. RCA: 100% proximal occluded.  LIMA to LAD: patent, supplies only a diagonal branch (probably D2). The LAD distal to the bifurcation is 100% occluded () and fills via right to left collaterals off the SVG to RCA. SVG to R-PDA: patent with moderate diffuse irregularities but no obstructive disease in the graft.    No appealing interventional targets.      NST as above     ICD Interrogation (11/12/2021) [employer] VVI, thoracic impedence trending up, no events, normal device function and good battery  ICD interrogation (5/12/21) [employer] single lead, no events, normal device function and good battery     HEMODYNAMICS:  RHC 5/24/21 CI 1.97, PA 33/9/17, RA 1, PCWP 6- off milrinone   CPEST not done     Patient underwent a 6 minute walk test 11/12/2021     6 Min Walk Report 11/12/2021 7/6/2021 5/20/2021   (PRE) HR 88 98 74   (PRE) O2 Sat 100 - 99   (POST)  - 81   (POST) O2 Sat 99 98% on Ra 99   Distance in Meters 386.58 - 1158.24            OTHER IMAGING:  CXR Results  (Last 48 hours)               02/01/22 1710  XR CHEST PA LAT Final result    Impression:  1. No acute disease or change. 2. RAVEN cancer, treated with radiation.        Narrative: INDICATION: SOB       EXAM: CXR 2 Views. COMPARISON: 1/24/2022. FINDINGS:   Frontal and lateral views of the chest show stable density in the left upper   lung compatible with radiation therapy for lung cancer. This underlies the ICD   power pack. There is no acute airspace disease or change. Heart size is normal. There is prior CABG. There is no pulmonary edema. There is   no evident pneumothorax or pleural effusion. Right arm PICC line is stable and satisfactory. CXR (1/24/22): FINDINGS: PA and lateral radiographs of the chest demonstrate a new infiltrate  in the apical segment of the left lower lobe. The cardiac and mediastinal  contours and pulmonary vascularity are stable. The bones and soft tissues are  within normal limits. IMPRESSION: Left lower lobe pneumonia    CXR (6/17/21) clear    Duplex LE venous Bilal (2/2/2021)  Right Lower Venous  Varicose vein(s) present. Varicose veins are patent. The common femoral, great saphenous, profunda femoral, femoral, popliteal, gastrocnemius, posterior tibial, peroneal and saphenous femoral junction vein(s) were imaged in the transverse and longitudinal planes. The vessels showed normal color filling and compressibility. Doppler interrogation of the veins showed phasic and spontaneous flow. Left Lower Venous  Varicose vein(s) present. Varicose veins are patent. The common femoral, great saphenous, saphenous femoral junction, profunda femoral, femoral, popliteal, gastrocnemius, posterior tibial and peroneal vein(s) were imaged in the transverse and longitudinal planes. The vessels showed normal color filling and compressibility. Doppler interrogation of the veins showed phasic and spontaneous flow. CT 5/21/21 1. Irregular pulmonary nodule in the left upper lobe measuring 2 cm, suspicious for primary pulmonary malignancy. 2. Additional 6 mm left upper lobe pulmonary nodule.  3. Severe calcific atherosclerosis of the coronary arteries and abdominal aorta. No aneurysm. 4. Cardiomegaly. 5. No acute abnormality within the chest, abdomen, or pelvis. INTERVAL HISTORY:  Patient in no acute distress  Patient reports his SOB improved after getting Bumex IV  Urinating Well  No CP; Troponin 45  Cr 1.18, K+:3.2, NT-ProBNP 5440    PHYSICAL EXAM:  Visit Vitals  /82   Pulse 74   Temp 97.6 °F (36.4 °C)   Resp 17   Ht 6' 1\" (1.854 m)   Wt 194 lb (88 kg)   SpO2 100%   BMI 25.60 kg/m²     General: Patient is well developed, well-nourished in no acute distress  HEENT: Normocephalic and atraumatic. No scleral icterus. Pupils are equal, round. No conjunctival injection. Oropharynx is clear. Neck: Supple. No evidence of thyroid enlargements or lymphadenopathy. JVD: Cannot be appreciated   Lungs: Breath sounds are equal and clear bilaterally. No wheezes, rhonchi, or rales. Heart: Regular rate and rhythm with normal S1 and S2. No murmurs, gallops or rubs. Abdomen: Soft, no mass or tenderness. No organomegaly or hernia. Bowel sounds present. Genitourinary and rectal: deferred  Extremities: No cyanosis, clubbing, or edema. Neurologic: No focal sensory or motor deficits are noted. Grossly intact. Psychiatric: Awake, alert an doriented x 3. Appropriate mood and affect. Skin: Warm, dry and well perfused. No lesions, nodules or rashes are noted. REVIEW OF SYSTEMS:  General: Denies fever, night sweats. Ear, nose and throat: Denies difficulty hearing, sinus problems, runny nose, post-nasal drip, ringing in ears, mouth sores, loose teeth, ear pain, nosebleeds, sore throate, facial pain or numbess  Cardiovascular: see above in the interval history  Respiratory: Denies cough, wheezing, sputum production, hemoptysis.  +SOB  Gastrointestinal: Denies heartburn, constipation, intolerance to certain foods, diarrhea, abdominal pain, nausea, vomiting, difficulty swallowing, blood in stool  Kidney and bladder: Denies painful urination, frequent urination, urgency, prostate problems and impotence  Musculoskeletal: Denies joint pain, muscle weakness  Skin and hair: Denies change in existing skin lesions, hair loss or increase, breast changes    PAST MEDICAL HISTORY:  Past Medical History:   Diagnosis Date    CAD (coronary artery disease) '90 '97, '13 x 2    MI, code ice in 2013 at [facility]    Calculus of kidney     Colonic polyps     Congestive heart failure, unspecified     Diabetes ([facility] [address]     Gastritis     Hypercholesterolemia     Sleep apnea        PAST SURGICAL HISTORY:  Past Surgical History:   Procedure Laterality Date    COLONOSCOPY  04/06/2011    16, due 21    ENDOSCOPY, COLON, DIAGNOSTIC      11, due 16    HX CORONARY ARTERY BYPASS GRAFT  8/22/12    x 4 vessel by MISSY Ramirez    HX HEENT      LASIK    HX PACEMAKER PLACEMENT  1/30/13    [place] CARDIAC SURG PROCEDURE UNLIST  2012    x 4 vessels       FAMILY HISTORY:  Family History   Problem Relation Age of Onset    Heart Disease Mother         MI    Heart Disease Father         CAD & PVD    Lung Disease Father     Cancer Father         lung    Diabetes Maternal Grandmother     Heart Disease Other         CAD    Stroke Sister        SOCIAL HISTORY:  Social History     Socioeconomic History    Marital status:    Tobacco Use    Smoking status: Never Smoker    Smokeless tobacco: Never Used   Vaping Use    Vaping Use: Never used   Substance and Sexual Activity    Alcohol use: Yes     Alcohol/week: 0.0 standard drinks     Comment:  VERY RARE    Drug use: No       LABORATORY RESULTS:     Labs Latest Ref Rng & Units 2/2/2022 2/1/2022   WBC 4.1 - 11.1 K/uL - 3. 0(L)   RBC 4.10 - 5.70 M/uL - 5.09   Hemoglobin 12.1 - 17.0 g/dL - 11. 7(L)   Hematocrit 36.6 - 50.3 % - 37.5   MCV 80.0 - 99.0 FL - 73. 7(L)   Platelets 888 - 853 K/uL - 144(L)   Lymphocytes 12 - 49 % - 21   Monocytes 5 - 13 % - 11   Eosinophils 0 - 7 % - 1   Basophils 0 - 1 % - 1   Albumin 3.5 - 5.0 g/dL - 3.6 Calcium 8.5 - 10.1 MG/DL 8.8 9.6   Glucose 65 - 100 mg/dL 213(H) 141(H)   BUN 6 - 20 MG/DL 21(H) 22(H)   Creatinine 0.70 - 1.30 MG/DL 1.18 1.38(H)   Sodium 136 - 145 mmol/L 137 136   Potassium 3.5 - 5.1 mmol/L 3.2(L) 3.9   Some recent data might be hidden     Lab Results   Component Value Date/Time    TSH 1.47 05/26/2021 10:44 PM    TSH 1.97 05/20/2021 04:28 PM    TSH 1.41 02/09/2021 03:05 PM    TSH 1.740 08/14/2018 09:58 AM    TSH 1.710 10/18/2017 12:00 AM       ALLERGY:  Allergies   Allergen Reactions    Oxycodone Anaphylaxis    Pcn [Penicillins] Hives        CURRENT MEDICATIONS:    Current Facility-Administered Medications:     aspirin delayed-release tablet 81 mg, 81 mg, Oral, DAILY, Eva Pereira MD, 81 mg at 02/02/22 0934    eplerenone (INSPRA) tablet 50 mg, 50 mg, Oral, DAILY, Eva Pereira MD    hydrALAZINE (APRESOLINE) tablet 50 mg, 50 mg, Oral, TID, Eva Pereira MD    isosorbide mononitrate ER (IMDUR) tablet 30 mg, 30 mg, Oral, Q12H, Eva Pereira MD, 30 mg at 02/02/22 0934    pantoprazole (PROTONIX) tablet 40 mg, 40 mg, Oral, ACB, Eva Pereira MD, 40 mg at 02/02/22 0934    prasugreL (EFFIENT) tablet 10 mg, 10 mg, Oral, DAILY, Eva Pereira MD, 10 mg at 02/02/22 0935    ranolazine ER (RANEXA) tablet 500 mg, 500 mg, Oral, Q12H, Eva Pereira MD, 500 mg at 02/02/22 0935    rosuvastatin (CRESTOR) tablet 10 mg, 10 mg, Oral, QHS, Eva Pereira MD    tamsulosin (FLOMAX) capsule 0.4 mg, 0.4 mg, Oral, DAILY, Eva Pereira MD, 0.4 mg at 02/02/22 0934    bumetanide (BUMEX) injection 2 mg, 2 mg, IntraVENous, BID, Eva Pereira MD, 2 mg at 02/02/22 0935    sodium chloride (NS) flush 5-40 mL, 5-40 mL, IntraVENous, Q8H, Eva Pereira MD    sodium chloride (NS) flush 5-40 mL, 5-40 mL, IntraVENous, PRN, Eva Pereira MD    acetaminophen (TYLENOL) tablet 650 mg, 650 mg, Oral, Q6H PRN **OR** acetaminophen (TYLENOL) suppository 650 mg, 650 mg, Rectal, Q6H PRN, Eva Pereira MD    polyethylene glycol (MIRALAX) packet 17 g, 17 g, Oral, DAILY PRN, Eva Pereira MD    ondansetron (ZOFRAN ODT) tablet 4 mg, 4 mg, Oral, Q8H PRN **OR** ondansetron (ZOFRAN) injection 4 mg, 4 mg, IntraVENous, Q6H PRN, Eva Pereira MD    milrinone (PRIMACOR) 20 MG/100 ML D5W infusion, 0.3 mcg/kg/min, IntraVENous, CONTINUOUS, Eva Pereira MD, Last Rate: 8 mL/hr at 02/02/22 0423, 0.3 mcg/kg/min at 02/02/22 0423    glucose chewable tablet 16 g, 4 Tablet, Oral, PRN, Eva Pereira MD    dextrose (D50W) injection syrg 12.5-25 g, 25-50 mL, IntraVENous, PRN, Eva Pereira MD    glucagon (GLUCAGEN) injection 1 mg, 1 mg, IntraMUSCular, PRN, Eva Pereira MD    potassium chloride SR (KLOR-CON 10) tablet 40 mEq, 40 mEq, Oral, DAILY, ROSLYN Anderson MD, 40 mEq at 02/02/22 7039    Current Outpatient Medications:     prasugreL (Effient) 10 mg tablet, Take 10 mg by mouth daily. , Disp: , Rfl:     metFORMIN (GLUCOPHAGE) 500 mg tablet, Take 1 Tablet by mouth two (2) times daily (with meals). , Disp: 180 Tablet, Rfl: 3    ranolazine ER (Ranexa) 500 mg SR tablet, Take 1 Tablet by mouth two (2) times a day., Disp: 180 Tablet, Rfl: 3    eplerenone (INSPRA) 50 mg tab tablet, Take 1 Tablet by mouth daily. , Disp: 90 Tablet, Rfl: 3    isosorbide mononitrate ER (IMDUR) 30 mg tablet, Take 1 Tablet by mouth every twelve (12) hours. , Disp: 180 Tablet, Rfl: 1    hydrALAZINE (APRESOLINE) 50 mg tablet, TAKE 1 TABLET BY MOUTH THREE TIMES DAILY, Disp: 180 Tablet, Rfl: 2    dapagliflozin (Farxiga) 10 mg tab tablet, Take 1 Tablet by mouth daily. , Disp: 90 Tablet, Rfl: 1    magnesium oxide (MAG-OX) 400 mg tablet, Take 1 Tablet by mouth daily.  (Patient not taking: Reported on 1/11/2022), Disp: 90 Tablet, Rfl: 1    ergocalciferol (ERGOCALCIFEROL) 1,250 mcg (50,000 unit) capsule, TAKE 1 CAPSULE BY MOUTH EVERY 7 DAYS FOR 11 DOSES, Disp: 12 Capsule, Rfl: 1   diphenhydrAMINE (Benadryl Allergy) 25 mg tablet, Take 25 mg by mouth every six (6) hours as needed. Needed every night d/t picc line causing itching, Disp: , Rfl:     benzonatate (TESSALON) 200 mg capsule, TAKE 1 CAPSULE BY MOUTH THREE TIMES DAILY AS NEEDED FOR COUGH, Disp: 30 Capsule, Rfl: 2    glucose blood VI test strips (OneTouch Ultra Test) strip, USE TO TEST BLOOD SUGAR DAILY, Disp: 100 Strip, Rfl: 3    OneTouch Delica Plus Lancet 33 gauge misc, USE TO TEST BLOOD SUGAR DAILY, Disp: 100 Lancet, Rfl: 3    omeprazole (PRILOSEC) 20 mg capsule, TAKE 1 CAPSULE BY MOUTH DAILY FOR INDIGESTION, Disp: 30 Capsule, Rfl: 1    milrinone (PRIMACOR) 20 mg/100 mL (200 mcg/mL) infusion, 26.58 mcg/min by IntraVENous route continuous. (Patient taking differently: 0.3 mcg/kg/min by IntraVENous route continuous. Based on weight of 88.7 kg), Disp: 100 mL, Rfl: 0    rosuvastatin (CRESTOR) 10 mg tablet, TAKE 1 TABLET NIGHTLY, Disp: 90 Tab, Rfl: 3    tamsulosin (FLOMAX) 0.4 mg capsule, TAKE 1 CAPSULE DAILY, Disp: 90 Cap, Rfl: 3    lancets misc, Use to test blood sugar daily, Disp: 100 Each, Rfl: 11    bumetanide (BUMEX) 2 mg tablet, Take 1 mg by mouth daily. , Disp: , Rfl: 0    acetaminophen (TYLENOL EXTRA STRENGTH) 500 mg tablet, Take  by mouth every six (6) hours as needed. , Disp: , Rfl:     aspirin delayed-release 81 mg tablet, Take 81 mg by mouth daily. , Disp: , Rfl:     PATIENT CARE TEAM:  Patient Care Team:  Bryant Bliss MD as PCP - General (Internal Medicine)  Bryant Bliss MD as PCP - [address] [place] [address]  El Vu MD as Physician (Cardiology)  Satinder Nowak MD as Physician (Cardiology)  Rosa Da Silva MD as Physician (Gastroenterology)  Ren Nina MD as Physician (Orthopedic Surgery)  Titi Maki MD as Physician (Ophthalmology)  Milana Adu Wilms, MD as Physician ([address] [place] [address])  Kylee Ba MD (Cardiology)  Jeanie Bustamante MD (Cardiology)       Thank you for your referral and allowing me to participate in this patient's care. Alfonso Valle DNP, AGAP-BC, PCCN, [facility]  Heart Failure Nurse Practitioner   [facility] [place]   [address] [place] [address] [place], [address] [facility]  W: [phone]/ F: [phone]  [facility] ATTENDING ADDENDUM    Patient was seen and examined in person. Data and notes were reviewed. I have discussed and agree with the plan as noted in the NP note above without further additions.     Abbey Cordova MD PhD  Donell Estrada

## 2022-02-02 NOTE — PROGRESS NOTES
TRANSFER - IN REPORT:    Verbal report received from ARTURO Tong RN(name) on [facility]  being received from ED(unit) for routine progression of care      Report consisted of patients Situation, Background, Assessment and   Recommendations(SBAR). Information from the following report(s) ED Summary was reviewed with the receiving nurse. Opportunity for questions and clarification was provided. Assessment completed upon patients arrival to unit and care assumed.

## 2022-02-02 NOTE — PROGRESS NOTES
[facility]  Hospitalist Group                                                                                          Hospitalist Progress Note  Kiah Vogel MD  Answering service: [phone] OR [phone] from in house phone        Date of Service:  2022  NAME:  Ligia Lorenzo  :  1945  MRN:  [id number]    This documentation was facilitated by a Voice Recognition software and may contain inadvertent typographical errors. Admission Summary:   Patient presented with worsening dyspnea and edema. His history significant for severe ischemic cardiomyopathy with LVEF of 20-25%, stage D NYHA class IV who is on chronic palliative milrinone infusion. He is followed by advanced heart failure team discussion underway for LVAD placement. Other significant PMH include type 2 diabetes, BPH, adenocarcinoma of the lung, completed radiation therapy. Interval history / Subjective:        Patient was in the ED when I rounded on him earlier. He says he is breathing better. He denied chest pain. Assessment & Plan:   Severe ischemic cardiomyopathy, CAD, history of MI   Acute on chronic systolic heart failure, NYHA class IV: POA. AICD in place. Patient being evaluated for LVAD  -Diuresis: Bumex 2 mg IV twice daily.  -Continue chronic palliative milrinone infusion.  -Continue current GDMT therapy.  -Daily weights  -Monitor and replete electrolytes  -Advanced heart failure team follow. Noninsulin-dependent type 2 diabetes. Takes Metformin and Farxiga at home. A1c 6.8 on 2027  -SSI plus hypoglycemic protocols  -Hold oral hypoglycemics, add Lantus 0 point units per KG daily.      CAD /history of MI  -Continue home medications     BPH  -Home Flomax            Care Plan discussed with: Patient  Code status: Full code  DVT prophylaxis: Plan  Anticipated Disposition: Home in 2-3 days          Hospital Problems  Date Reviewed: 2022          Codes Class Noted POA    CHF (congestive heart failure) ([facility] [address] ICD-10-CM: I50.9  ICD-9-CM: 428.0  2/1/2022 Unknown                Review of Systems:   A comprehensive review of systems was negative except for that written in the HPI. Vital Signs:    Last 24hrs VS reviewed since prior progress note. Most recent are:        Intake/Output Summary (Last 24 hours) at 2/2/2022 1728  Last data filed at 2/2/2022 0745  Gross per 24 hour   Intake 200 ml   Output 1700 ml   Net -1500 ml        Physical Examination:     I had a face to face encounter with this patient and independently examined them on2/2/2022 as outlined below:        Constitutional:  Alert, not in distress     HEENT:  Atraumatic. Oral mucosa moist,. Non icteric sclera. No pallor. Resp:  On room air  No accessory muscle use  Symmetrical air entry bilaterally  No wheezing/rhonchi/rales. Chest Wall:  AICD implanted device on the left chest wall     CV:  Regular rhythm, normal rate, no murmurs, gallops, rubs      GI:  Normoactive  Soft, non distended, non tender. No appreciable organomegaly      :  No CVA or suprapubic tenderness      Musculoskeletal:  + Edema      Neurologic:  Mental status:AAOx3,   Cranial nerves II-XII : WNL  Motor exam:Moves all extremities symmetrically  Gait and balance: Not tested. Data Review:    Review and/or order of clinical lab test  Review and/or order of tests in the radiology section of CPT  Review and/or order of tests in the medicine section of CPT      Available Lab and Imaging results reviewed and used to formulate the current care plan.       Medications Reviewed:     Current Facility-Administered Medications   Medication Dose Route Frequency    aspirin delayed-release tablet 81 mg  81 mg Oral DAILY    eplerenone (INSPRA) tablet 50 mg  50 mg Oral DAILY    hydrALAZINE (APRESOLINE) tablet 50 mg  50 mg Oral TID    isosorbide mononitrate ER (IMDUR) tablet 30 mg  30 mg Oral Q12H    pantoprazole (PROTONIX) tablet 40 mg  40 mg Oral ACB    prasugreL (EFFIENT) tablet 10 mg  10 mg Oral DAILY    ranolazine ER (RANEXA) tablet 500 mg  500 mg Oral Q12H    rosuvastatin (CRESTOR) tablet 10 mg  10 mg Oral QHS    tamsulosin (FLOMAX) capsule 0.4 mg  0.4 mg Oral DAILY    bumetanide (BUMEX) injection 2 mg  2 mg IntraVENous BID    sodium chloride (NS) flush 5-40 mL  5-40 mL IntraVENous Q8H    sodium chloride (NS) flush 5-40 mL  5-40 mL IntraVENous PRN    acetaminophen (TYLENOL) tablet 650 mg  650 mg Oral Q6H PRN    Or    acetaminophen (TYLENOL) suppository 650 mg  650 mg Rectal Q6H PRN    polyethylene glycol (MIRALAX) packet 17 g  17 g Oral DAILY PRN    ondansetron (ZOFRAN ODT) tablet 4 mg  4 mg Oral Q8H PRN    Or    ondansetron (ZOFRAN) injection 4 mg  4 mg IntraVENous Q6H PRN    glucose chewable tablet 16 g  4 Tablet Oral PRN    dextrose (D50W) injection syrg 12.5-25 g  25-50 mL IntraVENous PRN    glucagon (GLUCAGEN) injection 1 mg  1 mg IntraMUSCular PRN    milrinone (PRIMACOR) 20 MG/100 ML D5W infusion  0.3 mcg/kg/min IntraVENous CONTINUOUS    potassium chloride SR (KLOR-CON 10) tablet 40 mEq  40 mEq Oral BID    insulin lispro (HUMALOG) injection   SubCUTAneous AC&HS    dextrose (D50W) injection syrg 12.5-25 g  12.5-25 g IntraVENous PRN    iron sucrose (VENOFER) injection 200 mg  200 mg IntraVENous Q24H     Current Outpatient Medications   Medication Sig    prasugreL (Effient) 10 mg tablet Take 10 mg by mouth daily.  metFORMIN (GLUCOPHAGE) 500 mg tablet Take 1 Tablet by mouth two (2) times daily (with meals).  ranolazine ER (Ranexa) 500 mg SR tablet Take 1 Tablet by mouth two (2) times a day.  eplerenone (INSPRA) 50 mg tab tablet Take 1 Tablet by mouth daily.  isosorbide mononitrate ER (IMDUR) 30 mg tablet Take 1 Tablet by mouth every twelve (12) hours.  hydrALAZINE (APRESOLINE) 50 mg tablet TAKE 1 TABLET BY MOUTH THREE TIMES DAILY    dapagliflozin (Farxiga) 10 mg tab tablet Take 1 Tablet by mouth daily.     magnesium oxide (MAG-OX) 400 mg tablet Take 1 Tablet by mouth daily. (Patient not taking: Reported on 1/11/2022)    ergocalciferol (ERGOCALCIFEROL) 1,250 mcg (50,000 unit) capsule TAKE 1 CAPSULE BY MOUTH EVERY 7 DAYS FOR 11 DOSES    diphenhydrAMINE (Benadryl Allergy) 25 mg tablet Take 25 mg by mouth every six (6) hours as needed. Needed every night d/t picc line causing itching    benzonatate (TESSALON) 200 mg capsule TAKE 1 CAPSULE BY MOUTH THREE TIMES DAILY AS NEEDED FOR COUGH    glucose blood VI test strips (OneTouch Ultra Test) strip USE TO TEST BLOOD SUGAR DAILY    OneTouch Delica Plus Lancet 33 gauge misc USE TO TEST BLOOD SUGAR DAILY    omeprazole (PRILOSEC) 20 mg capsule TAKE 1 CAPSULE BY MOUTH DAILY FOR INDIGESTION    milrinone (PRIMACOR) 20 mg/100 mL (200 mcg/mL) infusion 26.58 mcg/min by IntraVENous route continuous. (Patient taking differently: 0.3 mcg/kg/min by IntraVENous route continuous. Based on weight of 88.7 kg)    rosuvastatin (CRESTOR) 10 mg tablet TAKE 1 TABLET NIGHTLY    tamsulosin (FLOMAX) 0.4 mg capsule TAKE 1 CAPSULE DAILY    lancets misc Use to test blood sugar daily    bumetanide (BUMEX) 2 mg tablet Take 1 mg by mouth daily.  acetaminophen (TYLENOL EXTRA STRENGTH) 500 mg tablet Take  by mouth every six (6) hours as needed.  aspirin delayed-release 81 mg tablet Take 81 mg by mouth daily.      ______________________________________________________________________  EXPECTED LENGTH OF STAY: - - -  ACTUAL LENGTH OF STAY:          1                 Reinier Landaverde MD

## 2022-02-02 NOTE — PROGRESS NOTES
[facility] in [place], [place]  Inpatient Progress Note      Patient name: Charito Lyons  Patient : 1945  Patient MRN: [id number]  Consulting MD: Rosita Rodríguez MD  Date of service: 22    REASON FOR REFERRAL:  Shortness of breath 2/2 chronic heart failure    PLAN OF CARE:  Continue diuresis to relieve symptoms of volume overload. RECOMMENDATIONS:  Continue current medical therapy for heart failure  Continue current dose of diuretic, Bumex 2mg IV BID  Fluid Goal net negative 1-2 liters per day  Daily weights  Continue ASA and prasugrel  Continue current dose of statin, rosuvastatin 10mg  Continue Milrinone 0.3mcg/kg (home dose)  Echo today     IMPRESSION:  HFrEF EF 15-20%, ECHO 2021  Shortness of breath- improving  Volume overload    LIFE GOALS:  Patient goals include returning to prior level of function, returning home after discharge. HPI:   Charito Lyons is a 69 y.o. male with past medical history of HFrEF with EF 15 to 20% on last echo on 2021 one milronone, BPH, CAD, MI, noninsulin-dependent type 2 diabetes, hypertension, GERD who presented to ER with chief complaint of shortness of breath. Reports intermittent dyspnea at rest, worsened with very minimal exertion over the last 3 days. Has also noted increasing bilateral LE edema over the same time-frame. States he was treated empirically outpatient with azithromycin for pneumonia  The patient denies any fever, chills, chest or abdominal pain, nausea, vomiting, cough, congestion, recent illness, palpitations, or dysuria. INTERVAL HISTORY:  Reports feeling better this morning. Does not appear distressed or tachypneic on room air. Speaking in full sentences. 1.1L negative balance overnight. CT/CTA chest negative for PE. Pleural effusions bilaterally R>L.       CARDIAC IMAGING:  Echo pending    HEMODYNAMICS:  RHC not done  CPEST not done  6MW not done     OTHER IMAGING:  CT Chest 2/1:   There is no pulmonary embolism. There is no apparent right heart strain. There is no aortic aneurysm.       Lungs show left upper lobe focal airspace disease (at the site of radiation  treated 1.8 cm carcinoma) measuring 3.3 x 3.0 cm; this is presumably post  radiation response, but if clinically indicated, follow-up PET could be  performed to confirm successful treatment.     There are new pleural effusions moderate on the right and trace on the left.     There is no pulmonary edema or pneumonia. There is no pneumothorax. There is no significant adenopathy.      Visualized thyroid and lower neck soft tissues are unremarkable. Visualized upper abdomen shows new ascites. Adrenals are not enlarged.     IMPRESSION  1. No pulmonary emboli. 2. Pleural effusions. 3. Treated RAVEN carcinoma as discussed. PHYSICAL EXAM:  Visit Vitals  /82   Pulse 74   Temp 97.6 °F (36.4 °C)   Resp 17   Ht 6' 1\" (1.854 m)   Wt 194 lb (88 kg)   SpO2 100%   BMI 25.60 kg/m²     General: Patient is well developed, well-nourished in no acute distress  HEENT: Normocephalic and atraumatic. No scleral icterus. Neck: Supple. No evidence of thyroid enlargements or lymphadenopathy. JVD: Cannot be appreciated   Lungs: Breath sounds are equal and clear bilaterally. No wheezes, rhonchi, or rales. Heart: Regular rate and rhythm with normal S1 and S2. No murmurs, gallops or rubs. Abdomen: Soft, no mass or tenderness. No organomegaly or hernia. Bowel sounds present. Genitourinary and rectal: deferred  Extremities:Trace edema in LLE  Neurologic: No focal sensory or motor deficits are noted. Grossly intact. Psychiatric: Awake, alert an doriented x 3. Appropriate mood and affect. Skin: Warm, dry and well perfused. No lesions, nodules or rashes are noted. REVIEW OF SYSTEMS:  General: Denies fever, night sweats.   Ear, nose and throat: Denies difficulty hearing, sinus problems, facial pain or numbess  Cardiovascular: see above in the interval history  Respiratory: Denies cough, wheezing, sputum production, hemoptysis. Endorses improving SOB  Gastrointestinal: Denies diarrhea, abdominal pain, nausea, vomiting, difficulty swallowing, blood in stool  Kidney and bladder: Denies painful urination,   Musculoskeletal: Denies joint pain, muscle weakness  Skin and hair: Denies change in existing skin lesions    PAST MEDICAL HISTORY:  Past Medical History:   Diagnosis Date    CAD (coronary artery disease) '90 '97, '13 x 2    MI, code ice in 2013 at [facility]    Calculus of kidney     Colonic polyps     Congestive heart failure, unspecified     Diabetes ([facility] [address]     Gastritis     Hypercholesterolemia     Sleep apnea        PAST SURGICAL HISTORY:  Past Surgical History:   Procedure Laterality Date    COLONOSCOPY  04/06/2011    16, due 21    ENDOSCOPY, COLON, DIAGNOSTIC      11, due 16    HX CORONARY ARTERY BYPASS GRAFT  8/22/12    x 4 vessel by MISSY LION    HX PACEMAKER PLACEMENT  1/30/13    MS CARDIAC SURG PROCEDURE UNLIST  2012    x 4 vessels       FAMILY HISTORY:  Family History   Problem Relation Age of Onset    Heart Disease Mother         MI    Heart Disease Father         CAD & PVD    Lung Disease Father     Cancer Father         lung    Diabetes Maternal Grandmother     Heart Disease Other         CAD    Stroke Sister        SOCIAL HISTORY:  Social History     Socioeconomic History    Marital status:    Tobacco Use    Smoking status: Never Smoker    Smokeless tobacco: Never Used   Vaping Use    Vaping Use: Never used   Substance and Sexual Activity    Alcohol use: Yes     Alcohol/week: 0.0 standard drinks     Comment:  VERY RARE    Drug use: No       LABORATORY RESULTS:     Labs Latest Ref Rng & Units 2/2/2022 2/1/2022   WBC 4.1 - 11.1 K/uL - 3. 0(L)   RBC 4.10 - 5.70 M/uL - 5.09   Hemoglobin 12.1 - 17.0 g/dL - 11. 7(L)   Hematocrit 36.6 - 50.3 % - 37.5   MCV 80.0 - 99.0 FL - 73. 7(L)   Platelets 842 - 808 K/uL - 144(L)   Lymphocytes 12 - 49 % - 21   Monocytes 5 - 13 % - 11   Eosinophils 0 - 7 % - 1   Basophils 0 - 1 % - 1   Albumin 3.5 - 5.0 g/dL - 3.6   Calcium 8.5 - 10.1 MG/DL 8.8 9.6   Glucose 65 - 100 mg/dL 213(H) 141(H)   BUN 6 - 20 MG/DL 21(H) 22(H)   Creatinine 0.70 - 1.30 MG/DL 1.18 1.38(H)   Sodium 136 - 145 mmol/L 137 136   Potassium 3.5 - 5.1 mmol/L 3.2(L) 3.9   Some recent data might be hidden     Lab Results   Component Value Date/Time    TSH 1.47 05/26/2021 10:44 PM    TSH 1.97 05/20/2021 04:28 PM    TSH 1.41 02/09/2021 03:05 PM    TSH 1.740 08/14/2018 09:58 AM    TSH 1.710 10/18/2017 12:00 AM       ALLERGY:  Allergies   Allergen Reactions    Oxycodone Anaphylaxis    Pcn [Penicillins] Hives        CURRENT MEDICATIONS:    Current Facility-Administered Medications:     aspirin delayed-release tablet 81 mg, 81 mg, Oral, DAILY, Eva Pereira MD, 81 mg at 02/02/22 0934    eplerenone (INSPRA) tablet 50 mg, 50 mg, Oral, DAILY, Eva Pereira MD    hydrALAZINE (APRESOLINE) tablet 50 mg, 50 mg, Oral, TID, Eva Pereira MD    isosorbide mononitrate ER (IMDUR) tablet 30 mg, 30 mg, Oral, Q12H, Eva Pereira MD, 30 mg at 02/02/22 0934    pantoprazole (PROTONIX) tablet 40 mg, 40 mg, Oral, ACB, Eva Pereira MD, 40 mg at 02/02/22 0934    prasugreL (EFFIENT) tablet 10 mg, 10 mg, Oral, DAILY, Eva Pereira MD, 10 mg at 02/02/22 0935    ranolazine ER (RANEXA) tablet 500 mg, 500 mg, Oral, Q12H, Eva Pereira MD, 500 mg at 02/02/22 0935    rosuvastatin (CRESTOR) tablet 10 mg, 10 mg, Oral, QHS, Eav Pereira MD    tamsulosin (FLOMAX) capsule 0.4 mg, 0.4 mg, Oral, DAILY, Eva Pereira MD, 0.4 mg at 02/02/22 0934    bumetanide (BUMEX) injection 2 mg, 2 mg, IntraVENous, BID, Eva Pereira MD, 2 mg at 02/02/22 0935    sodium chloride (NS) flush 5-40 mL, 5-40 mL, IntraVENous, Q8H, Eva Pereira MD    sodium chloride (NS) flush 5-40 mL, 5-40 mL, IntraVENous, PRN, Eva Pereira MD    acetaminophen (TYLENOL) tablet 650 mg, 650 mg, Oral, Q6H PRN **OR** acetaminophen (TYLENOL) suppository 650 mg, 650 mg, Rectal, Q6H PRN, Eva Pereira MD    polyethylene glycol (MIRALAX) packet 17 g, 17 g, Oral, DAILY PRN, Eva Pereira MD    ondansetron (ZOFRAN ODT) tablet 4 mg, 4 mg, Oral, Q8H PRN **OR** ondansetron (ZOFRAN) injection 4 mg, 4 mg, IntraVENous, Q6H PRN, Eva Pereira MD    milrinone (PRIMACOR) 20 MG/100 ML D5W infusion, 0.3 mcg/kg/min, IntraVENous, CONTINUOUS, Eva Pereira MD, Last Rate: 8 mL/hr at 02/02/22 0423, 0.3 mcg/kg/min at 02/02/22 0423    glucose chewable tablet 16 g, 4 Tablet, Oral, PRN, Eva Pereira MD    dextrose (D50W) injection syrg 12.5-25 g, 25-50 mL, IntraVENous, PRN, Eva Pereira MD    glucagon (GLUCAGEN) injection 1 mg, 1 mg, IntraMUSCular, PRN, Eva Pereira MD    potassium chloride SR (KLOR-CON 10) tablet 40 mEq, 40 mEq, Oral, DAILY, BATOOL Anderson MD, 40 mEq at 02/02/22 9305    Current Outpatient Medications:     prasugreL (Effient) 10 mg tablet, Take 10 mg by mouth daily. , Disp: , Rfl:     metFORMIN (GLUCOPHAGE) 500 mg tablet, Take 1 Tablet by mouth two (2) times daily (with meals). , Disp: 180 Tablet, Rfl: 3    ranolazine ER (Ranexa) 500 mg SR tablet, Take 1 Tablet by mouth two (2) times a day., Disp: 180 Tablet, Rfl: 3    eplerenone (INSPRA) 50 mg tab tablet, Take 1 Tablet by mouth daily. , Disp: 90 Tablet, Rfl: 3    isosorbide mononitrate ER (IMDUR) 30 mg tablet, Take 1 Tablet by mouth every twelve (12) hours. , Disp: 180 Tablet, Rfl: 1    hydrALAZINE (APRESOLINE) 50 mg tablet, TAKE 1 TABLET BY MOUTH THREE TIMES DAILY, Disp: 180 Tablet, Rfl: 2    dapagliflozin (Farxiga) 10 mg tab tablet, Take 1 Tablet by mouth daily. , Disp: 90 Tablet, Rfl: 1    magnesium oxide (MAG-OX) 400 mg tablet, Take 1 Tablet by mouth daily.  (Patient not taking: Reported on 1/11/2022), Disp: 90 Tablet, Rfl: 1    ergocalciferol (ERGOCALCIFEROL) 1,250 mcg (50,000 unit) capsule, TAKE 1 CAPSULE BY MOUTH EVERY 7 DAYS FOR 11 DOSES, Disp: 12 Capsule, Rfl: 1    diphenhydrAMINE (Benadryl Allergy) 25 mg tablet, Take 25 mg by mouth every six (6) hours as needed. Needed every night d/t picc line causing itching, Disp: , Rfl:     benzonatate (TESSALON) 200 mg capsule, TAKE 1 CAPSULE BY MOUTH THREE TIMES DAILY AS NEEDED FOR COUGH, Disp: 30 Capsule, Rfl: 2    glucose blood VI test strips (OneTouch Ultra Test) strip, USE TO TEST BLOOD SUGAR DAILY, Disp: 100 Strip, Rfl: 3    OneTouch Delica Plus Lancet 33 gauge misc, USE TO TEST BLOOD SUGAR DAILY, Disp: 100 Lancet, Rfl: 3    omeprazole (PRILOSEC) 20 mg capsule, TAKE 1 CAPSULE BY MOUTH DAILY FOR INDIGESTION, Disp: 30 Capsule, Rfl: 1    milrinone (PRIMACOR) 20 mg/100 mL (200 mcg/mL) infusion, 26.58 mcg/min by IntraVENous route continuous. (Patient taking differently: 0.3 mcg/kg/min by IntraVENous route continuous. Based on weight of 88.7 kg), Disp: 100 mL, Rfl: 0    rosuvastatin (CRESTOR) 10 mg tablet, TAKE 1 TABLET NIGHTLY, Disp: 90 Tab, Rfl: 3    tamsulosin (FLOMAX) 0.4 mg capsule, TAKE 1 CAPSULE DAILY, Disp: 90 Cap, Rfl: 3    lancets misc, Use to test blood sugar daily, Disp: 100 Each, Rfl: 11    bumetanide (BUMEX) 2 mg tablet, Take 1 mg by mouth daily. , Disp: , Rfl: 0    acetaminophen (TYLENOL EXTRA STRENGTH) 500 mg tablet, Take  by mouth every six (6) hours as needed. , Disp: , Rfl:     aspirin delayed-release 81 mg tablet, Take 81 mg by mouth daily. , Disp: , Rfl:     PATIENT CARE TEAM:  Patient Care Team:  Brianna Santos MD as PCP - General (Internal Medicine)  Brianna Santos MD as PCP - [facility] Provider  Junior Alex MD as Physician (Cardiology)  Penny Galaviz MD as Physician (Cardiology)  Megan Briseno MD as Physician (Gastroenterology)  Milo Forrester MD as Physician (Orthopedic Surgery)  Kendra Degroot MD as Physician (Ophthalmology)  Anay Nettles MD as Physician ([address])  Aleida Rueda MD (Cardiology)  Laith Pryor MD (Cardiology)     Thank you for allowing me to participate in this patient's care.     Pily Bah ANP Student

## 2022-02-02 NOTE — ED NOTES
Bedside and Verbal shift change report given to Mary Camacho RN (oncoming nurse) by Odilon Jeronimo RN (offgoing nurse). Report included the following information SBAR, Kardex, ED Summary, Procedure Summary and Intake/Output.

## 2022-02-02 NOTE — ED NOTES
Verbal shift change report given to Haylee Barraza RN (oncoming nurse) by Lashanda Fernando RN  (offgoing nurse). Report included the following information SBAR, Kardex and ED Summary.

## 2022-02-02 NOTE — CONSULTS
Advanced Heart Failure Team    Consult received. Full note to follow.        José Antonio Montano, GENA, AGACNP-BC, PCCN, [address]  Heart Failure Nurse Practitioner   [address]olas [address]aker [address]   [address], [place] [address] [facility] sara, [address]  W: [phone]/ F: [phone]

## 2022-02-02 NOTE — H&P
History & Physical    Primary Care Provider: Olga Ann MD  Source of Information: Patient and chart review    History of Presenting Illness: Miguel Gold is a 69 y.o. male with past medical history of HFrEF with EF 15 to 20% on last echo on November 2021 one milronone, BPH, CAD, MI, noninsulin-dependent type 2 diabetes, hypertension, GERD who presented to ER with chief complaint of shortness of breath. Reports intermittent dyspnea at rest, worsened with very minimal exertion over the last 3 days. Has also noted increasing bilateral LE edema over the same time-frame. States he was treated empirically outpatient with azithromycin for pneumonia  The patient denies any fever, chills, chest or abdominal pain, nausea, vomiting, cough, congestion, recent illness, palpitations, or dysuria. Remarkable vitals on ER Presentations: Vital signs stable. Labs Remarkable for: Creatinine 1.38, BNP 5440  ER Images: CTA chest: No PE, sequelae of left upper lobe carcinoma, pleural effusions. ER treatment included Lasix 40 mg IV x1 dose. Review of Systems:  A comprehensive review of systems was negative except for that written in the History of Present Illness. Past Medical History:   Diagnosis Date    CAD (coronary artery disease) '90 '97, '13 x 2    MI, code ice in 2013 at [facility]    Calculus of kidney     Colonic polyps     Congestive heart failure, unspecified     Diabetes ([facility] [address]     Gastritis     Hypercholesterolemia     Sleep apnea       Past Surgical History:   Procedure Laterality Date    COLONOSCOPY  04/06/2011    16, due 21    ENDOSCOPY, COLON, DIAGNOSTIC      11, due 16    HX CORONARY ARTERY BYPASS GRAFT  8/22/12    x 4 vessel by MISSY LION    HX PACEMAKER PLACEMENT  1/30/13    SC CARDIAC SURG PROCEDURE UNLIST  2012    x 4 vessels     Prior to Admission medications    Medication Sig Start Date End Date Taking?  Authorizing Provider   prasugreL (Effient) 10 mg tablet Take 10 mg by mouth daily. Provider, Historical   metFORMIN (GLUCOPHAGE) 500 mg tablet Take 1 Tablet by mouth two (2) times daily (with meals). 1/11/22   Sandra Gustafson MD   ranolazine ER (Ranexa) 500 mg SR tablet Take 1 Tablet by mouth two (2) times a day. 1/11/22   Sandra Gustafson MD   eplerenone (INSPRA) 50 mg tab tablet Take 1 Tablet by mouth daily. 1/11/22   Sandra Gustafson MD   isosorbide mononitrate ER (IMDUR) 30 mg tablet Take 1 Tablet by mouth every twelve (12) hours. 1/4/22   Lexa Hurtado NP   hydrALAZINE (APRESOLINE) 50 mg tablet TAKE 1 TABLET BY MOUTH THREE TIMES DAILY 12/17/21   Vin JACOBSON NP   dapagliflozin [facility]) 10 mg tab tablet Take 1 Tablet by mouth daily. 11/12/21   Cedric Matamoros NP   magnesium oxide (MAG-OX) 400 mg tablet Take 1 Tablet by mouth daily. Patient not taking: Reported on 1/11/2022 11/12/21   Cedric Matamoros NP   ergocalciferol (ERGOCALCIFEROL) 1,250 mcg (50,000 unit) capsule TAKE 1 CAPSULE BY MOUTH EVERY 7 DAYS FOR 11 DOSES 11/11/21   Vin JACOBSON NP   diphenhydrAMINE (Benadryl Allergy) 25 mg tablet Take 25 mg by mouth every six (6) hours as needed. Needed every night d/t picc line causing itching    Provider, Historical   benzonatate (TESSALON) 200 mg capsule TAKE 1 CAPSULE BY MOUTH THREE TIMES DAILY AS NEEDED FOR COUGH 9/16/21   Sandra Gustafson MD   glucose blood VI test strips (OneTouch Ultra Test) strip USE TO TEST BLOOD SUGAR DAILY 7/20/21   Sandra Gustafson MD OneTouch Delica Plus Lancet 33 gauge misc USE TO TEST BLOOD SUGAR DAILY 7/20/21   Sandra Gustafson MD   omeprazole (PRILOSEC) 20 mg capsule TAKE 1 CAPSULE BY MOUTH DAILY FOR INDIGESTION 7/6/21   Crispin Litten D, EARNESTINE   milrinone (PRIMACOR) 20 mg/100 mL (200 mcg/mL) infusion 26.58 mcg/min by IntraVENous route continuous. Patient taking differently: 0.3 mcg/kg/min by IntraVENous route continuous.  Based on weight of 88.7 kg 5/25/21   Angeli Stewart, NP   rosuvastatin (CRESTOR) 10 mg tablet TAKE 1 TABLET NIGHTLY 4/26/21   Nancy Gann MD   tamsulosin [facility]) 0.4 mg capsule TAKE 1 CAPSULE DAILY 3/28/21   Nancy Gann MD   lancets misc Use to test blood sugar daily 6/16/20   Sandra Gustafson MD   bumetanide (BUMEX) 2 mg tablet Take 1 mg by mouth daily. 6/19/19   Provider, Historical   acetaminophen (TYLENOL EXTRA STRENGTH) 500 mg tablet Take  by mouth every six (6) hours as needed. Provider, Historical   aspirin delayed-release 81 mg tablet Take 81 mg by mouth daily. Provider, Historical     Allergies   Allergen Reactions    Oxycodone Anaphylaxis    Pcn [Penicillins] Hives      Family History   Problem Relation Age of Onset    Heart Disease Mother         MI    Heart Disease Father         CAD & PVD    Lung Disease Father     Cancer Father         lung    Diabetes Maternal Grandmother     Heart Disease Other         CAD    Stroke Sister         SOCIAL HISTORY:  Patient resides:  Independently x   Assisted Living    SNF    With family care       Smoking history:   None x   Former    Chronic      Alcohol history:   None x   Social    Chronic      Ambulates:   Independently x   w/cane    w/walker    w/wc    CODE STATUS:  DNR    Full x   Other      Objective:     Physical Exam:     Visit Vitals  /78   Pulse 77   Temp 97.9 °F (36.6 °C)   Resp 30   SpO2 93%      O2 Device: None    General:  Alert, cooperative, no distress, appears stated age. Head:  Normocephalic, without obvious abnormality, atraumatic. Eyes:  Conjunctivae/corneas clear. PERRL, EOMs intact. Nose: Nares normal. Septum midline. Mucosa normal.        Neck: Supple, symmetrical, trachea midline, no carotid bruit and no JVD. Lungs:   Clear to auscultation bilaterally. Chest wall:  No tenderness or deformity. Heart:  Regular rate and rhythm, S1, S2 normal, no murmur, click, rub or gallop.    Abdomen: Soft, non-tender. Bowel sounds normal. No masses,  No organomegaly. Extremities: Extremities normal, atraumatic, no cyanosis. 1+edema of RLE. Pulses: 2+ and symmetric all extremities. Skin: Skin color, texture, turgor normal. No rashes or lesions   Neurologic: CNII-XII intact. EKG: Sinus rhythm with PVCs  Data Review:     Recent Days:  Recent Labs     02/01/22  1658   WBC 3.0*   HGB 11.7*   HCT 37.5   *     Recent Labs     02/01/22  1658      K 3.9      CO2 27   *   BUN 22*   CREA 1.38*   CA 9.6   ALB 3.6   ALT 19     No results for input(s): PH, PCO2, PO2, HCO3, FIO2 in the last 72 hours. 24 Hour Results:  Recent Results (from the past 24 hour(s))   CBC WITH AUTOMATED DIFF    Collection Time: 02/01/22  4:58 PM   Result Value Ref Range    WBC 3.0 (L) 4.1 - 11.1 K/uL    RBC 5.09 4. 10 - 5.70 M/uL    HGB 11.7 (L) 12.1 - 17.0 g/dL    HCT 37.5 36.6 - 50.3 %    MCV 73.7 (L) 80.0 - 99.0 FL    MCH 23.0 (L) 26.0 - 34.0 PG    MCHC 31.2 30.0 - 36.5 g/dL    RDW 18.1 (H) 11.5 - 14.5 %    PLATELET 754 (L) 732 - 400 K/uL    NRBC 0.0 0  WBC    ABSOLUTE NRBC 0.00 0.00 - 0.01 K/uL    NEUTROPHILS 66 32 - 75 %    LYMPHOCYTES 21 12 - 49 %    MONOCYTES 11 5 - 13 %    EOSINOPHILS 1 0 - 7 %    BASOPHILS 1 0 - 1 %    IMMATURE GRANULOCYTES 0 0.0 - 0.5 %    ABS. NEUTROPHILS 2.1 1.8 - 8.0 K/UL    ABS. LYMPHOCYTES 0.6 (L) 0.8 - 3.5 K/UL    ABS. MONOCYTES 0.3 0.0 - 1.0 K/UL    ABS. EOSINOPHILS 0.0 0.0 - 0.4 K/UL    ABS. BASOPHILS 0.0 0.0 - 0.1 K/UL    ABS. IMM.  GRANS. 0.0 0.00 - 0.04 K/UL    DF SMEAR SCANNED      RBC COMMENTS MICROCYTOSIS  1+        RBC COMMENTS ANISOCYTOSIS  1+        RBC COMMENTS POIKILOCYTOSIS  1+       METABOLIC PANEL, COMPREHENSIVE    Collection Time: 02/01/22  4:58 PM   Result Value Ref Range    Sodium 136 136 - 145 mmol/L    Potassium 3.9 3.5 - 5.1 mmol/L    Chloride 103 97 - 108 mmol/L    CO2 27 21 - 32 mmol/L    Anion gap 6 5 - 15 mmol/L    Glucose 141 (H) 65 - 100 mg/dL    BUN 22 (H) 6 - 20 MG/DL    Creatinine 1.38 (H) 0.70 - 1.30 MG/DL    BUN/Creatinine ratio 16 12 - 20      GFR est AA >60 >60 ml/min/1.73m2    GFR est non-AA 50 (L) >60 ml/min/1.73m2    Calcium 9.6 8.5 - 10.1 MG/DL    Bilirubin, total 1.5 (H) 0.2 - 1.0 MG/DL    ALT (SGPT) 19 12 - 78 U/L    AST (SGOT) 81 (H) 15 - 37 U/L    Alk. phosphatase 209 (H) 45 - 117 U/L    Protein, total 7.8 6.4 - 8.2 g/dL    Albumin 3.6 3.5 - 5.0 g/dL    Globulin 4.2 (H) 2.0 - 4.0 g/dL    A-G Ratio 0.9 (L) 1.1 - 2.2     SAMPLES BEING HELD    Collection Time: 02/01/22  4:58 PM   Result Value Ref Range    SAMPLES BEING HELD 1RED     COMMENT        Add-on orders for these samples will be processed based on acceptable specimen integrity and analyte stability, which may vary by analyte. NT-PRO BNP    Collection Time: 02/01/22  7:35 PM   Result Value Ref Range    NT pro-BNP 5,440 (H) <450 PG/ML   TROPONIN-HIGH SENSITIVITY    Collection Time: 02/01/22  7:35 PM   Result Value Ref Range    Troponin-High Sensitivity 45 0 - 76 ng/L         Imaging:     Assessment:     Rick Arenas is a 69 y.o. male with past medical history of HFrEF with EF 15 to 20% on last echo on November 2021, BPH, CAD, MI, noninsulin-dependent type 2 diabetes, hypertension, GERD who is admitted for CHF exacerbation. Plan:       Decompensated HFrEF  -Echo November 2021 with EF of 15 to 20%  -Admit to monitor on telemetry  -Continue diuresis with Bumex 2mg IV twice daily  -COntinue Milrinone gtt  -Daily weights  -Echocardiogram in a.m.  -Monitor and replete electrolytes  -Cardiology consult.     Noninsulin-dependent type 2 diabetes  -SSI plus hypoglycemic protocols    CAD /history of MI  -Continue home medications    BPH  -Home Flomax                FEN/GI -  PO hydration  Activity - as tolerated  DVT prophylaxis - Heparin  GI prophylaxis -  NI  Disposition - Home    CODE STATUS:  Full code       Signed By: David Mcclellan MD     February 1, 2022

## 2022-02-02 NOTE — DIABETES MGMT
[address] NURSE SPECIALIST CONSULT     Initial Presentation   Joyce Tavarez is a 69 y.o. male who presented to the ED 2/1/22 with increased SOB. He was seen in the outpatient setting earlier and started on oral antibiotics for PNA. HX:   Past Medical History:   Diagnosis Date    CAD (coronary artery disease) '90 '97, '13 x 2    MI, code ice in 2013 at [facility]    Calculus of kidney     Colonic polyps     Congestive heart failure, unspecified     Diabetes ([facility] [address]     Gastritis     Hypercholesterolemia     Sleep apnea         INITIAL DX:   CHF (congestive heart failure) ([facility] [address] [I50.9]     Current Treatment     TX: Diuresis and HF optimization     Consulted by Umberto Burton NP for advanced diabetes nursing assessment and care for:   [x] Inpatient management strategy    Hospital Course   Clinical progress has been uncomplicated. Diabetes History   Type 2 Diabetes  Ambulatory BG management provided by: Evette Islas MD PCP  Family history positive for diabetes: Maternal Grandmother with DM2      Diabetes-related Medical History  Acute complications  None  Neurological complications  Peripheral neuropathy  Microvascular disease  None  Macrovascular disease  CAD and Myocardial infarction  Other associated conditions     CHF    Diabetes Medication History  Key Antihyperglycemic Medications             metFORMIN (GLUCOPHAGE) 500 mg tablet Take 1 Tablet by mouth two (2) times daily (with meals). dapagliflozin (Farxiga) 10 mg tab tablet Take 1 Tablet by mouth daily.            Diabetes self-management practices:   Eating pattern  [x] Breakfast Eggs, toast, oatmeal  [x] Lunch  Sometimes skips or piece of fruit  [x] Dinner  Chicken/steak with a sweet potato or bowl of soup  [x] Bedtime Fruit  [x] Snacks Fruit  [x] Beverages Water  Physical activity pattern  Limited with HF  Monitoring pattern  Tests 2-3 times weekly  When testing, will see 106-135  Taking medications pattern  [x] Consistent administration  [x] Affordable  Social determinants of health impacting diabetes self-management practices   Concerned that you need to know more about how to stay healthy with diabetes  Overall evaluation:    [x] Achieving A1c target with drug therapy & self-care practices    Subjective   When can I get out of the ED? José Rodriguez        On home inotropes since Nov  Objective   Physical exam  General Normal weight male in no acute distress/ill-appearing. Conversant and cooperative  Neuro  Alert, oriented   Vital Signs   Visit Vitals  /83   Pulse 82   Temp 97.6 °F (36.4 °C)   Resp 28   Ht 6' 1\" (1.854 m)   Wt 88 kg (194 lb)   SpO2 100%   BMI 25.60 kg/m²     Skin  Warm and dry. No acanthosis noted along neckline. Heart   Regular rate and rhythm.  No murmurs, rubs or gallops  Lungs  Clear to auscultation without rales or rhonchi  Extremities No foot wounds        Laboratory  Recent Labs     02/02/22  0440 02/01/22  1658   * 141*   AGAP 7 6   WBC  --  3.0*   CREA 1.18 1.38*   GFRNA >60 50*   AST  --  81*   ALT  --  19       Factors impacting BG management  Factor Dose Comments   Nutrition:  Standard meals     60 grams/meal      CHF On chronic milrinone Impaired insulin delivery     Blood glucose pattern      Significant diabetes-related events over the past 24-72 hours  A1C 6.8%  BG on labs 141 and 213  No POC glucose monitoring    Assessment and Plan   Nursing Diagnosis Risk for unstable blood glucose pattern   Nursing Intervention Domain 0905 Decision-making Support   Nursing Interventions Examined current inpatient diabetes/blood glucose control   Explored factors facilitating and impeding inpatient management  Explored corrective strategies with patient and responsible inpatient provider   Informed patient of rational for insulin strategy while hospitalized     Evaluation   Priyanka Gonzalez is a 69year old gentleman, with Type 2 Diabetes on chronic inotrope therapy for CHF, who is admitted with acute on chronic CHF exacerbation. He did meet diabetes control PTA as evidenced by an A1C of 6.8%. His oral antihyperglycemic agents were held on admission and glucose on initial labs was 141 but as high as 213 on routine labs this morning. No fingerstick POC glucose values have been obtained. Please start serum glucose fingersticks along with correctional humalog. If BG sustained over inpatient glucose goal of 100-180mg/dl, please use basal insulin for duration of inpatient stay when he can transition back to his oral antihyperglycemic agents. Recommendations   1. POC glucose ACHS    2. Add consistent carbohydrate portion of diet (60 grams CHO/meal)    3. Correctional humalog ACHS at normal sensitivity    4. If BG sustained over 200, start the Subcutaneous Insulin Order set (0513) with: Insulin Dosing Specific recommendation   Basal                                      (Based on weight, BMI & GFR) [x]        0.2 units/kg/D    Corrective                                       (Useful in adjusting insulin dosing) [x] Normal sensitivity        5. Please hold metformin and jardiance in the inpatient setting. Billing Code(s)   [x] 05589   Before making these care recommendations, I personally reviewed the hospitalization record, including notes, laboratory & diagnostic data and current medications, and examined the patient at the bedside (circumstances permitting) before making care recommendations. More than fifty (50) percent of the time was spent in patient counseling and/or care coordination.   Total minutes: 28      FAROOQ Zaragoza  Diabetes Clinical Nurse Specialist  [facility]  Access via [employer]

## 2022-02-02 NOTE — ED NOTES
Bedside and Verbal shift change report given to Stefan Lora RN (oncoming nurse) by Yoni Ferguson RN (offgoing nurse). Report included the following information SBAR, Kardex, ED Summary, Procedure Summary and Intake/Output.

## 2022-02-03 LAB
ALBUMIN SERPL-MCNC: 2.9 G/DL (ref 3.5–5)
ALBUMIN/GLOB SERPL: 0.9 {RATIO} (ref 1.1–2.2)
ALP SERPL-CCNC: 165 U/L (ref 45–117)
ALT SERPL-CCNC: 16 U/L (ref 12–78)
ANION GAP SERPL CALC-SCNC: 3 MMOL/L (ref 5–15)
AST SERPL-CCNC: 69 U/L (ref 15–37)
BASOPHILS # BLD: 0 K/UL (ref 0–0.1)
BASOPHILS NFR BLD: 1 % (ref 0–1)
BILIRUB SERPL-MCNC: 1.2 MG/DL (ref 0.2–1)
BNP SERPL-MCNC: 4457 PG/ML
BUN SERPL-MCNC: 20 MG/DL (ref 6–20)
BUN/CREAT SERPL: 17 (ref 12–20)
CALCIUM SERPL-MCNC: 8.6 MG/DL (ref 8.5–10.1)
CHLORIDE SERPL-SCNC: 107 MMOL/L (ref 97–108)
CO2 SERPL-SCNC: 28 MMOL/L (ref 21–32)
CREAT SERPL-MCNC: 1.15 MG/DL (ref 0.7–1.3)
DIFFERENTIAL METHOD BLD: ABNORMAL
EOSINOPHIL # BLD: 0.1 K/UL (ref 0–0.4)
EOSINOPHIL NFR BLD: 4 % (ref 0–7)
ERYTHROCYTE [DISTWIDTH] IN BLOOD BY AUTOMATED COUNT: 17.9 % (ref 11.5–14.5)
GLOBULIN SER CALC-MCNC: 3.4 G/DL (ref 2–4)
GLUCOSE BLD STRIP.AUTO-MCNC: 126 MG/DL (ref 65–117)
GLUCOSE BLD STRIP.AUTO-MCNC: 129 MG/DL (ref 65–117)
GLUCOSE BLD STRIP.AUTO-MCNC: 161 MG/DL (ref 65–117)
GLUCOSE BLD STRIP.AUTO-MCNC: 98 MG/DL (ref 65–117)
GLUCOSE SERPL-MCNC: 101 MG/DL (ref 65–100)
HCT VFR BLD AUTO: 31.4 % (ref 36.6–50.3)
HGB BLD-MCNC: 10.1 G/DL (ref 12.1–17)
IMM GRANULOCYTES # BLD AUTO: 0 K/UL (ref 0–0.04)
IMM GRANULOCYTES NFR BLD AUTO: 0 % (ref 0–0.5)
LYMPHOCYTES # BLD: 0.8 K/UL (ref 0.8–3.5)
LYMPHOCYTES NFR BLD: 29 % (ref 12–49)
MAGNESIUM SERPL-MCNC: 1.9 MG/DL (ref 1.6–2.4)
MCH RBC QN AUTO: 23.1 PG (ref 26–34)
MCHC RBC AUTO-ENTMCNC: 32.2 G/DL (ref 30–36.5)
MCV RBC AUTO: 71.7 FL (ref 80–99)
MONOCYTES # BLD: 0.4 K/UL (ref 0–1)
MONOCYTES NFR BLD: 15 % (ref 5–13)
NEUTS SEG # BLD: 1.3 K/UL (ref 1.8–8)
NEUTS SEG NFR BLD: 51 % (ref 32–75)
NRBC # BLD: 0 K/UL (ref 0–0.01)
NRBC BLD-RTO: 0 PER 100 WBC
PLATELET # BLD AUTO: 123 K/UL (ref 150–400)
POTASSIUM SERPL-SCNC: 3.8 MMOL/L (ref 3.5–5.1)
PROT SERPL-MCNC: 6.3 G/DL (ref 6.4–8.2)
RBC # BLD AUTO: 4.38 M/UL (ref 4.1–5.7)
RBC MORPH BLD: ABNORMAL
SERVICE CMNT-IMP: ABNORMAL
SERVICE CMNT-IMP: NORMAL
SODIUM SERPL-SCNC: 138 MMOL/L (ref 136–145)
WBC # BLD AUTO: 2.6 K/UL (ref 4.1–11.1)

## 2022-02-03 PROCEDURE — 83880 ASSAY OF NATRIURETIC PEPTIDE: CPT

## 2022-02-03 PROCEDURE — 82962 GLUCOSE BLOOD TEST: CPT

## 2022-02-03 PROCEDURE — 74011250636 HC RX REV CODE- 250/636: Performed by: HOSPITALIST

## 2022-02-03 PROCEDURE — 99233 SBSQ HOSP IP/OBS HIGH 50: CPT | Performed by: INTERNAL MEDICINE

## 2022-02-03 PROCEDURE — 83735 ASSAY OF MAGNESIUM: CPT

## 2022-02-03 PROCEDURE — 65660000001 HC RM ICU INTERMED STEPDOWN

## 2022-02-03 PROCEDURE — 99231 SBSQ HOSP IP/OBS SF/LOW 25: CPT | Performed by: CLINICAL NURSE SPECIALIST

## 2022-02-03 PROCEDURE — 74011250636 HC RX REV CODE- 250/636: Performed by: NURSE PRACTITIONER

## 2022-02-03 PROCEDURE — 74011250637 HC RX REV CODE- 250/637: Performed by: NURSE PRACTITIONER

## 2022-02-03 PROCEDURE — APPSS180 APP SPLIT SHARED TIME > 60 MINUTES: Performed by: NURSE PRACTITIONER

## 2022-02-03 PROCEDURE — P9047 ALBUMIN (HUMAN), 25%, 50ML: HCPCS | Performed by: NURSE PRACTITIONER

## 2022-02-03 PROCEDURE — 85025 COMPLETE CBC W/AUTO DIFF WBC: CPT

## 2022-02-03 PROCEDURE — 74011636637 HC RX REV CODE- 636/637: Performed by: HOSPITALIST

## 2022-02-03 PROCEDURE — 80053 COMPREHEN METABOLIC PANEL: CPT

## 2022-02-03 PROCEDURE — 74011250637 HC RX REV CODE- 250/637: Performed by: STUDENT IN AN ORGANIZED HEALTH CARE EDUCATION/TRAINING PROGRAM

## 2022-02-03 PROCEDURE — 74011000250 HC RX REV CODE- 250: Performed by: STUDENT IN AN ORGANIZED HEALTH CARE EDUCATION/TRAINING PROGRAM

## 2022-02-03 PROCEDURE — 94660 CPAP INITIATION&MGMT: CPT

## 2022-02-03 PROCEDURE — 36415 COLL VENOUS BLD VENIPUNCTURE: CPT

## 2022-02-03 RX ORDER — POTASSIUM CHLORIDE 750 MG/1
20 TABLET, FILM COATED, EXTENDED RELEASE ORAL
Status: COMPLETED | OUTPATIENT
Start: 2022-02-03 | End: 2022-02-03

## 2022-02-03 RX ORDER — BUMETANIDE 1 MG/1
2 TABLET ORAL 3 TIMES DAILY
Status: DISCONTINUED | OUTPATIENT
Start: 2022-02-03 | End: 2022-02-05 | Stop reason: HOSPADM

## 2022-02-03 RX ORDER — ALBUMIN HUMAN 250 G/1000ML
12.5 SOLUTION INTRAVENOUS DAILY
Status: DISCONTINUED | OUTPATIENT
Start: 2022-02-03 | End: 2022-02-05 | Stop reason: HOSPADM

## 2022-02-03 RX ORDER — MILRINONE LACTATE 0.2 MG/ML
0.38 INJECTION, SOLUTION INTRAVENOUS CONTINUOUS
Status: DISCONTINUED | OUTPATIENT
Start: 2022-02-03 | End: 2022-02-05 | Stop reason: HOSPADM

## 2022-02-03 RX ADMIN — POTASSIUM CHLORIDE 20 MEQ: 750 TABLET, FILM COATED, EXTENDED RELEASE ORAL at 09:54

## 2022-02-03 RX ADMIN — HYDRALAZINE HYDROCHLORIDE 50 MG: 50 TABLET, FILM COATED ORAL at 16:45

## 2022-02-03 RX ADMIN — HYDRALAZINE HYDROCHLORIDE 50 MG: 50 TABLET, FILM COATED ORAL at 09:55

## 2022-02-03 RX ADMIN — BUMETANIDE 2 MG: 1 TABLET ORAL at 21:11

## 2022-02-03 RX ADMIN — SODIUM CHLORIDE, PRESERVATIVE FREE 10 ML: 5 INJECTION INTRAVENOUS at 21:11

## 2022-02-03 RX ADMIN — PANTOPRAZOLE SODIUM 40 MG: 40 TABLET, DELAYED RELEASE ORAL at 06:35

## 2022-02-03 RX ADMIN — BUMETANIDE 2 MG: 1 TABLET ORAL at 16:45

## 2022-02-03 RX ADMIN — HEPARIN SODIUM 5000 UNITS: 5000 INJECTION INTRAVENOUS; SUBCUTANEOUS at 01:54

## 2022-02-03 RX ADMIN — HEPARIN SODIUM 5000 UNITS: 5000 INJECTION INTRAVENOUS; SUBCUTANEOUS at 21:11

## 2022-02-03 RX ADMIN — MILRINONE LACTATE IN DEXTROSE 0.3 MCG/KG/MIN: 200 INJECTION, SOLUTION INTRAVENOUS at 09:34

## 2022-02-03 RX ADMIN — RANOLAZINE 500 MG: 500 TABLET, FILM COATED, EXTENDED RELEASE ORAL at 21:11

## 2022-02-03 RX ADMIN — INSULIN GLARGINE 18 UNITS: 100 INJECTION, SOLUTION SUBCUTANEOUS at 09:41

## 2022-02-03 RX ADMIN — ALBUMIN (HUMAN) 12.5 G: 0.25 INJECTION, SOLUTION INTRAVENOUS at 09:45

## 2022-02-03 RX ADMIN — ROSUVASTATIN CALCIUM 10 MG: 10 TABLET, COATED ORAL at 21:11

## 2022-02-03 RX ADMIN — HYDRALAZINE HYDROCHLORIDE 50 MG: 50 TABLET, FILM COATED ORAL at 21:11

## 2022-02-03 RX ADMIN — RANOLAZINE 500 MG: 500 TABLET, FILM COATED, EXTENDED RELEASE ORAL at 09:55

## 2022-02-03 RX ADMIN — TAMSULOSIN HYDROCHLORIDE 0.4 MG: 0.4 CAPSULE ORAL at 09:55

## 2022-02-03 RX ADMIN — IRON SUCROSE 200 MG: 20 INJECTION, SOLUTION INTRAVENOUS at 17:08

## 2022-02-03 RX ADMIN — MILRINONE LACTATE IN DEXTROSE 0.38 MCG/KG/MIN: 200 INJECTION, SOLUTION INTRAVENOUS at 16:45

## 2022-02-03 RX ADMIN — PRASUGREL 10 MG: 10 TABLET, FILM COATED ORAL at 09:55

## 2022-02-03 RX ADMIN — ISOSORBIDE MONONITRATE 30 MG: 30 TABLET, EXTENDED RELEASE ORAL at 21:11

## 2022-02-03 RX ADMIN — ASPIRIN 81 MG: 81 TABLET, COATED ORAL at 09:55

## 2022-02-03 RX ADMIN — ISOSORBIDE MONONITRATE 30 MG: 30 TABLET, EXTENDED RELEASE ORAL at 09:55

## 2022-02-03 RX ADMIN — POTASSIUM CHLORIDE 40 MEQ: 750 TABLET, FILM COATED, EXTENDED RELEASE ORAL at 09:54

## 2022-02-03 RX ADMIN — BUMETANIDE 2 MG: 0.25 INJECTION, SOLUTION INTRAMUSCULAR; INTRAVENOUS at 09:42

## 2022-02-03 RX ADMIN — SODIUM CHLORIDE, PRESERVATIVE FREE 10 ML: 5 INJECTION INTRAVENOUS at 16:46

## 2022-02-03 RX ADMIN — EPLERENONE 50 MG: 25 TABLET, FILM COATED ORAL at 09:56

## 2022-02-03 RX ADMIN — Medication 2 UNITS: at 12:17

## 2022-02-03 RX ADMIN — HEPARIN SODIUM 5000 UNITS: 5000 INJECTION INTRAVENOUS; SUBCUTANEOUS at 09:56

## 2022-02-03 RX ADMIN — POTASSIUM CHLORIDE 40 MEQ: 750 TABLET, FILM COATED, EXTENDED RELEASE ORAL at 21:11

## 2022-02-03 NOTE — ROUTINE PROCESS
18- Supervisors working on transferring patient to Intermediate floor, specifically [facility] per transfer order.

## 2022-02-03 NOTE — PROGRESS NOTES
[facility] in [facility],  E [facility]  Inpatient Progress Note      Patient name: Savannah Ceballos  Patient : 1945  Patient MRN: [id number]  Consulting MD: Rocco Snow MD  Date of service: 22    REASON FOR REFERRAL:  Chronic systolic heart failure    PLAN OF CARE:  · 70 y/o male with h/o severe ischemic cardiomyopathy, LVEF 20-25%; stage D, NYHA class IV symptoms on chronic inotropic support with milrinone gtt; patient unerwent LVAD-DT evaluation which was placed on hold due to discovery of lung nodule posittive for adenocarcinoma which required radiation therapy and ICD explant/reimplant; patient did not complete evaluation (EGD/C-scope needed) as he has not been decided if he would like to proceed with LVAD  · Patient now admitted for acute decompensation of heart failure after recent ?pneumonia treated with Z-pack underwent diuresis; patient remains undecided about LVAD therapy; will ambulate today and assess readiness for discharge    RECOMMENDATIONS/PLAN:  Continue current medical therapy for heart failure  Increase milrinone from 0.3 to 0.375 mcg/kg/min - dose based on 84.5 kg. Please do standing weight daily   ECHO completed ; showed LV is moderately dilated. Severe global hypokinesis present. EF of 15 - 20%. Grade II diastolic dysfunction with increased LAP. Mild MR, Mod. TR  Last ECHO 2021 showed LVEF 15-20%, mod-severe MR, mod TR   Intolerant Coreg due to RV dysfunction   ACEi/ARB/ARNi previously discontinued in anticipation of possible cardiac surgery  Continue current dose of eplerenone 50 mg daily  Order Potassium 20 mEq po once in addition to daily dose: K level 3.8  Continue Potassium 40 mEq po BID  Continue Farxiga 10 mg daily- not on formuary. May need to bring medication from home if not DC today  Continue current dose of Hydralazine 50 TID and Imdur 30 mg twice daily  Transition Bumex 2 mg from IV BID to 2 mg po TID.  Home dose bumex 1mg daily; occasionally misses doses. Goal weight 192   Low Iron, given Venofer 200 mg daily X 2 doses  Low albumin @2.9, ordered daily albumin infusion  Continue ASA 81 mg daily   Continue Effient 10 mg po daily  Continue current dose of rosuvastatin 10 mg daily  Continue ranolazine 500 mg BID (for HR and angina control)  Ordered CPAP therapy - uses most of the time at home  Consulted Case Management for discharge planning. Also placed [address] OP  Patient with pain in left leg, Duplex showed No evidence of right or left lower extremity vein thrombosis. Advanced care plan forms completed   Diabetes with A1c 6.8%  on 1/11/22; consulted Diabetes management  Genetic test negative   Ordered AICD interrogation   Reinforced heart healthy, low salt diet  Reinforced appropriate fluid intake of 6 x 8oz glasses of water per day  Monitor and record daily standing weights and strict I/O's  Follow-up with primary cardiologist Dr. Gentry Montejo   Follow-up with [facility] on 2/8 at 9 am virtual apt    Rest per primary team      LIFE GOALS:  Patient's personal goals include: breath better. Had been SOB for several days  Important upcoming milestones or family events:bothing special yet  The patient identifies the following as important for living well: be able to do activities like walking w/o SOB. IMPRESSION:  Fatigue at rest  Shortness of breath with exertion  Acute on chronic systolic heart failure  · Stage D, NYHA class IV symptoms improved to class II on inotropes  · Non-ischemic cardiomyopathy, LVEF 20% with LVEDD 6.2  · RV dysfunction, TAPSE 1.22 (prelimnary read)  · TBili 1.5 likely from cardiac congestion  At risk of sudden cardiac death  · Recent cardiac arrest   · S/p ICD (1/2013, [employer] followed [facility]);  New implant on 10/21/2021 [place] [employer]  Coronary artery disease  · S/p multiple interventions  · S/p 4V CABG (8/2012)  · LHC (7/2019) severe stenosis of LIMA to LAD anastomosis site, SVG graft to OM is occluded, SVG graft to RCA 40-50%, LAD occluded proximally, severe proximal LCx, RCA is the long . · PET (6/2019) EF 24% with anterior lateral and inferior reversible defect  Cardiac risk factors  · HTN  · HL  · DM2  · SRUTHI on CPAP  · MIld carotid stenosis  Anemia, microcytic  · Iron deficiency  DVT with filter  Pulmonary nodules   Dysphagia     CARDIAC IMAGING:  ECHO ( 2/2/22):   Left Ventricle: Left ventricle is moderately dilated. Mildly increased wall thickness. Severe global hypokinesis present. Severely reduced left ventricular systolic function with a visually estimated EF of 15 - 20%. Grade II diastolic dysfunction with increased LAP.   Right Ventricle: Right ventricle is mildly dilated. Mildly reduced systolic function.   Aortic Valve: Mild sclerosis of the aortic valve cusps. Mild transvalvular regurgitation.   Mitral Valve: Mild transvalvular regurgitation.   Tricuspid Valve: Moderate transvalvular regurgitation. Mildly elevated RVSP. RVSP is 39 mmHg.   Left Atrium: Left atrium is moderately dilated. Echo (11/23/21):  · LV 15-20%. · Mod-severe, MR, mod-TR     Echo (5/20/21)  · LV: Estimated LVEF is 20 - 25%. Normal wall thickness. Mildly dilated left ventricle. Severely and globally reduced systolic function. Severe (grade 4) left ventricular diastolic dysfunction. · LA: Severely dilated left atrium. · RA: Severely dilated right atrium. · MV: Moderate mitral valve regurgitation is present. · TV: Moderate tricuspid valve regurgitation is present. · AO: Mild aortic root dilatation. · RV: Pacer/ICD present. · LVED 6.2cm     EKG (5/20/21) SB with 1degree AV block, QRS 116ms     LHC (5/24/21) Native Coronaries: LM - moderate to severe distal disease 50%. % prox occluded (), heavily calcified, LCx: 80% proximal stenosis. OM1 is  (seen filling faintly via collaterals). OM2 99% stenosis. RCA: 100% proximal occluded.  LIMA to LAD: patent, supplies only a diagonal branch (probably D2). The LAD distal to the bifurcation is 100% occluded () and fills via right to left collaterals off the SVG to RCA. SVG to R-PDA: patent with moderate diffuse irregularities but no obstructive disease in the graft.    No appealing interventional targets.      NST as above     ICD Interrogation (11/12/2021) [employer] VVI, thoracic impedence trending up, no events, normal device function and good battery  ICD interrogation (5/12/21) [employer] single lead, no events, normal device function and good battery     HEMODYNAMICS:  RHC 5/24/21 CI 1.97, PA 33/9/17, RA 1, PCWP 6- off milrinone   CPEST not done     Patient underwent a 6 minute walk test 11/12/2021     6 Min Walk Report 11/12/2021 7/6/2021 5/20/2021   (PRE) HR 88 98 74   (PRE) O2 Sat 100 - 99   (POST)  - 81   (POST) O2 Sat 99 98% on Ra 99   Distance in Meters 386.58 - 1158.24            OTHER IMAGING:  CXR Results  (Last 48 hours)               02/01/22 1710  XR CHEST PA LAT Final result    Impression:  1. No acute disease or change. 2. RAVEN cancer, treated with radiation. Narrative:  INDICATION: SOB       EXAM: CXR 2 Views. COMPARISON: 1/24/2022. FINDINGS:   Frontal and lateral views of the chest show stable density in the left upper   lung compatible with radiation therapy for lung cancer. This underlies the ICD   power pack. There is no acute airspace disease or change. Heart size is normal. There is prior CABG. There is no pulmonary edema. There is   no evident pneumothorax or pleural effusion. Right arm PICC line is stable and satisfactory. CXR (1/24/22): FINDINGS: PA and lateral radiographs of the chest demonstrate a new infiltrate  in the apical segment of the left lower lobe. The cardiac and mediastinal  contours and pulmonary vascularity are stable. The bones and soft tissues are  within normal limits.    IMPRESSION: Left lower lobe pneumonia    CXR (6/17/21) clear    Duplex LE venous Bilal (2/2/2021)  Right Lower Venous  Varicose vein(s) present. Varicose veins are patent. The common femoral, great saphenous, profunda femoral, femoral, popliteal, gastrocnemius, posterior tibial, peroneal and saphenous femoral junction vein(s) were imaged in the transverse and longitudinal planes. The vessels showed normal color filling and compressibility. Doppler interrogation of the veins showed phasic and spontaneous flow. Left Lower Venous  Varicose vein(s) present. Varicose veins are patent. The common femoral, great saphenous, saphenous femoral junction, profunda femoral, femoral, popliteal, gastrocnemius, posterior tibial and peroneal vein(s) were imaged in the transverse and longitudinal planes. The vessels showed normal color filling and compressibility. Doppler interrogation of the veins showed phasic and spontaneous flow. CT 5/21/21 1. Irregular pulmonary nodule in the left upper lobe measuring 2 cm, suspicious for primary pulmonary malignancy. 2. Additional 6 mm left upper lobe pulmonary nodule. 3. Severe calcific atherosclerosis of the coronary arteries and abdominal aorta. No aneurysm. 4. Cardiomegaly. 5. No acute abnormality within the chest, abdomen, or pelvis. INTERVAL HISTORY:  Patient in no acute distress  Patient stated \" feel better than ever\"  Urinating Well  No CP; Cr 1.15, K+:3.8, NT-ProBNP 4457 trending down  Plan to DC if stable this afternoon    PHYSICAL EXAM:  Visit Vitals  /66 (BP 1 Location: Left upper arm, BP Patient Position: At rest)   Pulse 74   Temp 97.6 °F (36.4 °C)   Resp 19   Ht 6' 1\" (1.854 m)   Wt 194 lb (88 kg)   SpO2 97%   BMI 25.60 kg/m²     General: Patient is well developed, well-nourished in no acute distress  HEENT: Normocephalic and atraumatic. No scleral icterus. Pupils are equal, round. No conjunctival injection. Oropharynx is clear. Neck: Supple.  No evidence of thyroid enlargements or lymphadenopathy. JVD: Cannot be appreciated   Lungs: Breath sounds are equal and clear bilaterally. No wheezes, rhonchi, or rales. Heart: Regular rate and rhythm with normal S1 and S2. No murmurs, gallops or rubs. Abdomen: Soft, no mass or tenderness. No organomegaly or hernia. Bowel sounds present. Genitourinary and rectal: deferred  Extremities: No cyanosis, clubbing, or edema. Neurologic: No focal sensory or motor deficits are noted. Grossly intact. Psychiatric: Awake, alert an doriented x 3. Appropriate mood and affect. Skin: Warm, dry and well perfused. No lesions, nodules or rashes are noted. REVIEW OF SYSTEMS:  General: Denies fever, night sweats. Ear, nose and throat: Denies difficulty hearing, sinus problems, runny nose, post-nasal drip, ringing in ears, mouth sores, loose teeth, ear pain, nosebleeds, sore throate, facial pain or numbess  Cardiovascular: see above in the interval history  Respiratory: Denies cough, wheezing, sputum production, hemoptysis.  +SOB  Gastrointestinal: Denies heartburn, constipation, intolerance to certain foods, diarrhea, abdominal pain, nausea, vomiting, difficulty swallowing, blood in stool  Kidney and bladder: Denies painful urination, frequent urination, urgency, prostate problems and impotence  Musculoskeletal: Denies joint pain, muscle weakness  Skin and hair: Denies change in existing skin lesions, hair loss or increase, breast changes    PAST MEDICAL HISTORY:  Past Medical History:   Diagnosis Date    CAD (coronary artery disease) '90  '97, '13 x 2    [place] [facility] [place] Calculus of kidney     Colonic polyps     Congestive heart failure, unspecified     Diabetes ([facility] [address]     Gastritis     Hypercholesterolemia     Sleep apnea        PAST SURGICAL HISTORY:  Past Surgical History:   Procedure Laterality Date    COLONOSCOPY  04/06/2011    16, due 21    ENDOSCOPY, COLON, DIAGNOSTIC      11, due 16    HX CORONARY ARTERY BYPASS GRAFT 8/22/12    x 4 vessel by MISSY CABRALES TON GARZAIK    HX PACEMAKER PLACEMENT  1/30/13    RI CARDIAC SURG PROCEDURE UNLIST  2012    x 4 vessels       FAMILY HISTORY:  Family History   Problem Relation Age of Onset    Heart Disease Mother         MI    Heart Disease Father         CAD & PVD    Lung Disease Father     Cancer Father         lung    Diabetes Maternal Grandmother     Heart Disease Other         CAD    Stroke Sister        SOCIAL HISTORY:  Social History     Socioeconomic History    Marital status:    Tobacco Use    Smoking status: Never Smoker    Smokeless tobacco: Never Used   Vaping Use    Vaping Use: Never used   Substance and Sexual Activity    Alcohol use: Yes     Alcohol/week: 0.0 standard drinks     Comment:  VERY RARE    Drug use: No       LABORATORY RESULTS:     Labs Latest Ref Rng & Units 2/3/2022 2/2/2022 2/2/2022 2/1/2022   WBC 4.1 - 11.1 K/uL 2. 6(L) - - 3. 0(L)   RBC 4.10 - 5.70 M/uL 4.38 - - 5.09   Hemoglobin 12.1 - 17.0 g/dL 10. 1(L) - - 11. 7(L)   Hematocrit 36.6 - 50.3 % 31. 4(L) - - 37.5   MCV 80.0 - 99.0 FL 71. 7(L) - - 73. 7(L)   Platelets 254 - 428 K/uL 123(L) - - 144(L)   Lymphocytes 12 - 49 % 29 - - 21   Monocytes 5 - 13 % 15(H) - - 11   Eosinophils 0 - 7 % 4 - - 1   Basophils 0 - 1 % 1 - - 1   Albumin 3.5 - 5.0 g/dL 2. 9(L) - - 3.6   Calcium 8.5 - 10.1 MG/DL 8.6 9.1 8.8 9.6   Glucose 65 - 100 mg/dL 101(H) 200(H) 213(H) 141(H)   BUN 6 - 20 MG/DL 20 21(H) 21(H) 22(H)   Creatinine 0.70 - 1.30 MG/DL 1.15 1.26 1.18 1.38(H)   Sodium 136 - 145 mmol/L 138 136 137 136   Potassium 3.5 - 5.1 mmol/L 3.8 3.6 3. 2(L) 3.9   Some recent data might be hidden     Lab Results   Component Value Date/Time    TSH 1.47 05/26/2021 10:44 PM    TSH 1.97 05/20/2021 04:28 PM    TSH 1.41 02/09/2021 03:05 PM    TSH 1.740 08/14/2018 09:58 AM    TSH 1.710 10/18/2017 12:00 AM       ALLERGY:  Allergies   Allergen Reactions    Oxycodone Anaphylaxis    Pcn [Penicillins] Hives        CURRENT MEDICATIONS:    Current Facility-Administered Medications:     albumin human 25% (BUMINATE) solution 12.5 g, 12.5 g, IntraVENous, DAILY, Kacy Broderick NP, 12.5 g at 02/03/22 0945    bumetanide (BUMEX) tablet 2 mg, 2 mg, Oral, TID, Kacy Broderick NP    aspirin delayed-release tablet 81 mg, 81 mg, Oral, DAILY, Eva Pereira MD, 81 mg at 02/03/22 0955    eplerenone (INSPRA) tablet 50 mg, 50 mg, Oral, DAILY, Eva Pereira MD, 50 mg at 02/03/22 0956    hydrALAZINE (APRESOLINE) tablet 50 mg, 50 mg, Oral, TID, Eva Pereira MD, 50 mg at 02/03/22 0955    isosorbide mononitrate ER (IMDUR) tablet 30 mg, 30 mg, Oral, Q12H, Eva Pereira MD, 30 mg at 02/03/22 0955    pantoprazole (PROTONIX) tablet 40 mg, 40 mg, Oral, ACB, Eva Pereira MD, 40 mg at 02/03/22 0635    prasugreL (EFFIENT) tablet 10 mg, 10 mg, Oral, DAILY, Eva Pereira MD, 10 mg at 02/03/22 0955    ranolazine ER (RANEXA) tablet 500 mg, 500 mg, Oral, Q12H, Eva Pereira MD, 500 mg at 02/03/22 0955    rosuvastatin (CRESTOR) tablet 10 mg, 10 mg, Oral, QHS, Eva Pereira MD, 10 mg at 02/02/22 2152    tamsulosin (FLOMAX) capsule 0.4 mg, 0.4 mg, Oral, DAILY, Eva Pereira MD, 0.4 mg at 02/03/22 0955    sodium chloride (NS) flush 5-40 mL, 5-40 mL, IntraVENous, Q8H, Eva Pereira MD, 10 mL at 02/02/22 2153    sodium chloride (NS) flush 5-40 mL, 5-40 mL, IntraVENous, PRN, Eva Pereira MD    acetaminophen (TYLENOL) tablet 650 mg, 650 mg, Oral, Q6H PRN **OR** acetaminophen (TYLENOL) suppository 650 mg, 650 mg, Rectal, Q6H PRN, Eva Pereira MD    polyethylene glycol (MIRALAX) packet 17 g, 17 g, Oral, DAILY PRN, Eva Pereira MD    ondansetron (ZOFRAN ODT) tablet 4 mg, 4 mg, Oral, Q8H PRN **OR** ondansetron (ZOFRAN) injection 4 mg, 4 mg, IntraVENous, Q6H PRN, Eva Pereira MD    glucose chewable tablet 16 g, 4 Tablet, Oral, PRN, Eva Pereira MD    dextrose (D50W) injection syrg 12.5-25 g, 25-50 mL, IntraVENous, PRN, Eva Pereira MD    glucagon (GLUCAGEN) injection 1 mg, 1 mg, IntraMUSCular, PRN, Eva Pereira MD    milrinone (PRIMACOR) 20 MG/100 ML D5W infusion, 0.375 mcg/kg/min, IntraVENous, CONTINUOUS, Onofre Broderick NP, Last Rate: 7.9 mL/hr at 02/03/22 0934, 0.3 mcg/kg/min at 02/03/22 0934    potassium chloride SR (KLOR-CON 10) tablet 40 mEq, 40 mEq, Oral, BID, Juan Carr NP, 40 mEq at 02/03/22 0954    insulin lispro (HUMALOG) injection, , SubCUTAneous, AC&HS, TAMIKA Anderson MD    dextrose (D50W) injection syrg 12.5-25 g, 12.5-25 g, IntraVENous, PRN, ERYN Anderson MD    iron sucrose (VENOFER) injection 200 mg, 200 mg, IntraVENous, Q24H, Onofre Broderick NP, 200 mg at 02/02/22 1848    glucose chewable tablet 16 g, 4 Tablet, Oral, PRN, GÓMEZ Anderson MD    dextrose (D50W) injection syrg 12.5-25 g, 25-50 mL, IntraVENous, PRN, Justin NYXWTLG MD WINSTON    glucagon (GLUCAGEN) injection 1 mg, 1 mg, IntraMUSCular, PRN, Justin VVNICO MONTERO MD    insulin glargine (LANTUS) injection 18 Units, 0.2 Units/kg, SubCUTAneous, DAILY, Justin BPSERNST MONTERO MD, 18 Units at 02/03/22 0941    heparin (porcine) injection 5,000 Units, 5,000 Units, SubCUTAneous, Q8H, WILLIAM Anderson MD, 5,000 Units at 02/03/22 0956    benzonatate (TESSALON) capsule 200 mg, 200 mg, Oral, TID PRN, Rosita Rodríguez MD, 200 mg at 02/02/22 1858    PATIENT CARE TEAM:  Patient Care Team:  Lili Huntley MD as PCP - General (Internal Medicine)  Lili Huntley MD as PCP - [facility] Empaneled Provider  Demetria Casiano MD as Physician (Cardiology)  Efrain Barron MD as Physician (Cardiology)  John Hernandez MD as Physician (Gastroenterology)  Marielena Islas MD as Physician (Orthopedic Surgery)  Elsi Fortune MD as Physician (Ophthalmology)  Vista Ross Wilms, MD as Physician ([address])  Simin Savage MD (Cardiology)  Berna Sales MD (Cardiology) Thank you for your referral and allowing me to participate in this patient's care. Kris Casas DNP, AGAP-BC, [facility], [address] [facility]  Heart Failure Nurse Practitioner   [facility]   [address] [facility] [address] [facility] sara, [address]  W: [phone]/ F: [phone]    [facility] ATTENDING ADDENDUM    Patient was seen and examined in person. Data and notes were reviewed. I have discussed and agree with the plan as noted in the NP note above without further additions.     Vinay Chamberlain MD PhD  [place] [place]

## 2022-02-03 NOTE — PROGRESS NOTES
1427- RT was contacted about patient's need for 6 minute walk. RT will coordinate with RT team to get this done as soon as able.

## 2022-02-03 NOTE — PROGRESS NOTES
MARY;  LATANYA has updated Aiyana Damon at [facility] on patient's status and transfer. I also spoke with Joie Ortiz at [facility] and gave update. Wife and patient were made aware of transfer at this time.

## 2022-02-03 NOTE — PROGRESS NOTES
800:    Bedside shift change report given to 11 Sujata Barton (oncoming nurse) by Manpreet Salomon (offgoing nurse). Report included the following information SBAR, Kardex, Intake/Output, MAR and Recent Results.

## 2022-02-03 NOTE — DIABETES MGMT
[address] NURSE SPECIALIST CONSULT     Initial Presentation   Shanelle Cornejo is a 69 y.o. male who presented to the ED 2/1/22 with increased SOB. He was seen in the outpatient setting earlier and started on oral antibiotics for PNA. HX:   Past Medical History:   Diagnosis Date    CAD (coronary artery disease) '90 '97, '13 x 2    MI, code ice in 2013 at [facility]    Calculus of kidney     Colonic polyps     Congestive heart failure, unspecified     Diabetes ([address]     Gastritis     Hypercholesterolemia     Sleep apnea         INITIAL DX:   CHF (congestive heart failure) ([facility] [address] [I50.9]     Current Treatment     TX: Diuresis and HF optimization     Consulted by Clarice Franklin NP for advanced diabetes nursing assessment and care for:   [x] Inpatient management strategy    Hospital Course   Clinical progress has been uncomplicated. Diabetes History   Type 2 Diabetes  Ambulatory BG management provided by: Vianca Dubon MD PCP  Family history positive for diabetes: Maternal Grandmother with DM2      Diabetes-related Medical History  Acute complications  None  Neurological complications  Peripheral neuropathy  Microvascular disease  None  Macrovascular disease  CAD and Myocardial infarction  Other associated conditions     CHF    Diabetes Medication History  Key Antihyperglycemic Medications             metFORMIN (GLUCOPHAGE) 500 mg tablet Take 1 Tablet by mouth two (2) times daily (with meals). dapagliflozin (Farxiga) 10 mg tab tablet Take 1 Tablet by mouth daily.            Diabetes self-management practices:   Eating pattern  [x] Breakfast Eggs, toast, oatmeal  [x] Lunch  Sometimes skips or piece of fruit  [x] Dinner  Chicken/steak with a sweet potato or bowl of soup  [x] Bedtime Fruit  [x] Snacks Fruit  [x] Beverages Water  Physical activity pattern  Limited with HF  Monitoring pattern  Tests 2-3 times weekly  When testing, will see 106-135  Taking medications pattern  [x] Consistent administration  [x] Affordable  Social determinants of health impacting diabetes self-management practices   Concerned that you need to know more about how to stay healthy with diabetes  Overall evaluation:    [x] Achieving A1c target with drug therapy & self-care practices    Subjective   When can I get out of the ED? [place]        On home inotropes since Nov  Objective   Physical exam  General Normal weight male in no acute distress/ill-appearing. Conversant and cooperative  Neuro  Alert, oriented   Vital Signs   Visit Vitals  /66 (BP 1 Location: Left upper arm, BP Patient Position: At rest)   Pulse 77   Temp 97.6 °F (36.4 °C)   Resp 19   Ht 6' 1\" (1.854 m)   Wt 84.5 kg (186 lb 4.8 oz)   SpO2 97%   BMI 24.58 kg/m²     Skin  Warm and dry. No acanthosis noted along neckline. Heart   Regular rate and rhythm. No murmurs, rubs or gallops  Lungs  Clear to auscultation without rales or rhonchi  Extremities No foot wounds        Laboratory  Recent Labs     22  0321 22  1253 22  0440 22  1658 22  1658   * 200* 213*   < > 141*   AGAP 3* 8 7   < > 6   WBC 2.6*  --   --   --  3.0*   CREA 1.15 1.26 1.18   < > 1.38*   GFRNA >60 56* >60   < > 50*   AST 69*  --   --   --  81*   ALT 16  --   --   --  19    < > = values in this interval not displayed.        Factors impacting BG management  Factor Dose Comments   Nutrition:  Standard meals     60 grams/meal      CHF On chronic milrinone Impaired insulin delivery     Blood glucose pattern      Significant diabetes-related events over the past 24-72 hours  A1C 6.8%  Fasting B  Pre-prandial B-182  Milrinone adjusted  Possible d/c today  Given 18 units Lantus this am    Assessment and Plan   Nursing Diagnosis Risk for unstable blood glucose pattern   Nursing Intervention Domain 1216 Decision-making Support   Nursing Interventions Examined current inpatient diabetes/blood glucose control Explored factors facilitating and impeding inpatient management  Explored corrective strategies with patient and responsible inpatient provider   Informed patient of rational for insulin strategy while hospitalized     Evaluation   Yue Wetzel is a 69year old gentleman, with Type 2 Diabetes on chronic inotrope therapy for CHF, who is admitted with acute on chronic CHF exacerbation. He did meet diabetes control PTA as evidenced by an A1C of 6.8%. His oral antihyperglycemic agents were held on admission and glucose on initial labs was 141 but as high as 213 on routine labs the following morning. Glucose trends in the last 24hrs was  and 18 units Lantus given. Based on his fasting glucose this am at 98, he may need less basal insulin if here tomorrow. Please adjust to maintain an inpatient glucose goal of 100-180mg/dl. Recommendations   1. POC glucose ACHS    2. Continue consistent carbohydrate portion of diet (60 grams CHO/meal)    3. Correctional humalog ACHS at normal sensitivity    4. Noted Lantus 18 units daily given. If fasting BG under 100 tomorrow, reduce dose by 4 units    5. Please hold metformin and jardiance in the inpatient setting. Diabetes Management Team to sign off at this point as patient's blood glucose remains stable. Please re-consult us if patient needs change. Thank you for including us in their care. Billing Code(s)   [x] 81146  Before making these care recommendations, I personally reviewed the hospitalization record, including notes, laboratory & diagnostic data and current medications, and examined the patient at the bedside (circumstances permitting) before making care recommendations. More than fifty (50) percent of the time was spent in patient counseling and/or care coordination.   Total minutes: 13      FAROOQ Agee  Diabetes Clinical Nurse Specialist  [facility]  Access via [employer]

## 2022-02-03 NOTE — PROGRESS NOTES
NICOM Hemodynamic Monitoring       Visit Vitals  /75 (BP 1 Location: Left upper arm, BP Patient Position: At rest)   Pulse 86   Temp 97.6 °F (36.4 °C)   Resp 19   Ht 6' 1\" (1.854 m)   Wt 84.5 kg (186 lb 4.8 oz)   SpO2 96%   BMI 24.58 kg/m²         Baseline assessment:        CO 7.0        CI 3.3        SVI 39        SVV 14

## 2022-02-03 NOTE — PROGRESS NOTES
[facility]25-[facility] was contacted to assist in completing NICOM. [facility] will send staff member with equipment and they will perform test with writer.

## 2022-02-03 NOTE — PROGRESS NOTES
Reason for Admission:                       RUR Score:     15%             PCP: First and Last name:   López Patel MD     Name of Practice: Internal Practice    Are you a current patient: Yes/No: Yes   Patient also being followed by Dr Nataliia Neves at [address] heart failure clinic   Approximate date of last visit: unknown   Can you participate in a virtual visit if needed: yes per wife     Do you (patient/family) have anyWi concerns for transition/discharge? Wife is concerned about patient being discharged from hospital too soon. She has concerns about spouse's sob               Plan for utilizing home health:   Patient open to [facility] for skilled nursing,PT and Inotopic therapy. Current Advanced Directive/Advance Care Plan:  Full Code      Healthcare Decision Maker:   Click here to complete [address] including selection of the Healthcare Decision Maker Relationship (ie \"Primary\")              Transition of Care Plan:  Patient experienced severe sob and was brought to ED. He came to room late last nite and is on a milrinone gtt that was supplied by [facility]. He is being evaluated for needing oxygen. Wife and daughter Mika Tan are with patient at home. He is weak per wife but is able to be out of bed with minimal assistance. Patient uses the [facility] pharmacy on [facility] for medications. I have notified [facility] of admission and spoke with Michael Pollack [phone]. Care management will follow for transitions of care needs.

## 2022-02-03 NOTE — PROGRESS NOTES
1825hrs:  TRANSFER - IN REPORT:  Verbal report received from [address], RN(name) on [facility]  being received from 2N(unit) for routine progression of care    Report consisted of patients Situation, Background, Assessment and Recommendations(SBAR). Information from the following report(s) SBAR, Intake/Output, MAR, Recent Results, Med Rec Status and Cardiac Rhythm NSR with frequent PVC's, Bigem. was reviewed with the receiving nurse. Opportunity for questions and clarification was provided. Assessment completed upon patients arrival to unit and care assumed. 1926hrs:  Patient arrived to [facility] with patient transport. Vitals stable. 1948hrs:  Bedside and Verbal shift change report given to Kylee Dawn RN (oncoming nurse) by Ramos Mike RN (offgoing nurse). Report included the following information SBAR, Kardex, Intake/Output, MAR, Recent Results, Med Rec Status and Cardiac Rhythm NSR with bigeminal and trigeminal PVC's.

## 2022-02-03 NOTE — ROUTINE PROCESS
Davie with advanced heart failure has been informed of patient's orders being executed as requested. 1. Patient's NIcom was completed  NICOM Hemodynamic Monitoring       Visit Vitals  /75 (BP 1 Location: Left upper arm, BP Patient Position: At rest)  Pulse 86  Temp 97.6 °F (36.4 °C)  Resp 19  Ht 6' 1\" (1.854 m)  Wt 84.5 kg (186 lb 4.8 oz)  SpO2 96%  BMI 24.58 kg/m²        Baseline assessment:        CO 7.0        CI 3.3        SVI 39        SVV 14                2. RT is here now at 1650 to do 6 minute walk- Patient is in hallway walking now    3.  Dr. Diamond Colvin has put in for transfer to [facility] and bed placement is aware and working on transfer asap

## 2022-02-03 NOTE — PROGRESS NOTES
[address] Adult  Hospitalist Group                                                                                          Hospitalist Progress Note  Kandace Gann MD  Answering service: [phone] from in house phone        Date of Service:  2/3/2022  NAME:  Shanelle Cornejo  :  1945  MRN:  [id number]    This documentation was facilitated by a Voice Recognition software and may contain inadvertent typographical errors. Admission Summary:   Patient presented with worsening dyspnea and edema. His history significant for severe ischemic cardiomyopathy with LVEF of 20-25%, stage D NYHA class IV who is on chronic palliative milrinone infusion. He is followed by advanced heart failure team discussion underway for LVAD placement. Other significant PMH include type 2 diabetes, BPH, adenocarcinoma of the lung, completed radiation therapy. Interval history / Subjective:        2/3/2022 :    Pt had good night   Case discussed w AHF   Case discussed with wife   Tanisha on current rx for today   6 min walk test needed   AHF to established non invasive monitoring system    [facility] as below     Assessment & Plan:   Severe ischemic cardiomyopathy, CAD, history of MI   Acute on chronic systolic heart failure, NYHA class IV: POA. AICD in place. Patient being evaluated for LVAD  -Diuresis: Bumex 2 mg IV twice daily.  -Continue chronic palliative milrinone infusion.  -Continue current GDMT therapy.  -Daily weights  -Monitor and replete electrolytes  -Advanced heart failure team follow.  - good night for pt last pm      Noninsulin-dependent type 2 diabetes. Takes Metformin and Farxiga at home. A1c 6.8 on 2027  -SSI plus hypoglycemic protocols  -Hold oral hypoglycemics, add Lantus 0 point units per KG daily.   Lab Results   Component Value Date/Time    Glucose 101 (H) 2022 03:21 AM    Glucose (POC) 161 (H) 2022 11:04 AM         CAD /history of MI  -Continue home medications     BPH  -Home Flomax            Care Plan discussed with: Patient  Code status: Full code  DVT prophylaxis: Plan  Anticipated Disposition: Home in 2-3 days          Hospital Problems  Date Reviewed: 1/24/2022          Codes Class Noted POA    CHF (congestive heart failure) ([facility] [address] ICD-10-CM: I50.9  ICD-9-CM: 428.0  2/1/2022 Unknown                Review of Systems:   A comprehensive review of systems was negative except for that written in the HPI. Vital Signs:    Last 24hrs VS reviewed since prior progress note. Most recent are:        Intake/Output Summary (Last 24 hours) at 2/3/2022 1450  Last data filed at 2/3/2022 1207  Gross per 24 hour   Intake 240 ml   Output 950 ml   Net -710 ml        Physical Examination:     I had a face to face encounter with this patient and independently examined them on2/3/2022 as outlined below:        Constitutional:  Alert, not in distress     HEENT:  Atraumatic. Oral mucosa moist,. Non icteric sclera. No pallor. Resp:  On room air ( CPAP LAST PM )   No accessory muscle use  Symmetrical air entry bilaterally  No wheezing/rhonchi/rales. Chest Wall:  AICD implanted device on the left chest wall     CV:  Regular rhythm, normal rate,        GI:  Normoactive  Soft, non distended, non tender. No appreciable organomegaly      :  No CVA or suprapubic tenderness      Musculoskeletal:  + Edema      Neurologic:  Mental status:AAOx3,   Cranial nerves II-XII : WNL  Motor exam:Moves all extremities symmetrically  Gait and balance: Not tested. Data Review:    Review and/or order of clinical lab test  Review and/or order of tests in the radiology section of CPT  Review and/or order of tests in the medicine section of CPT      Available Lab and Imaging results reviewed and used to formulate the current care plan.       Medications Reviewed:     Current Facility-Administered Medications   Medication Dose Route Frequency    albumin human 25% (BUMINATE) solution 12.5 g  12.5 g IntraVENous DAILY    bumetanide (BUMEX) tablet 2 mg  2 mg Oral TID    milrinone (PRIMACOR) 20 MG/100 ML D5W infusion  0.375 mcg/kg/min IntraVENous CONTINUOUS    aspirin delayed-release tablet 81 mg  81 mg Oral DAILY    eplerenone (INSPRA) tablet 50 mg  50 mg Oral DAILY    hydrALAZINE (APRESOLINE) tablet 50 mg  50 mg Oral TID    isosorbide mononitrate ER (IMDUR) tablet 30 mg  30 mg Oral Q12H    pantoprazole (PROTONIX) tablet 40 mg  40 mg Oral ACB    prasugreL (EFFIENT) tablet 10 mg  10 mg Oral DAILY    ranolazine ER (RANEXA) tablet 500 mg  500 mg Oral Q12H    rosuvastatin (CRESTOR) tablet 10 mg  10 mg Oral QHS    tamsulosin (FLOMAX) capsule 0.4 mg  0.4 mg Oral DAILY    sodium chloride (NS) flush 5-40 mL  5-40 mL IntraVENous Q8H    sodium chloride (NS) flush 5-40 mL  5-40 mL IntraVENous PRN    acetaminophen (TYLENOL) tablet 650 mg  650 mg Oral Q6H PRN    Or    acetaminophen (TYLENOL) suppository 650 mg  650 mg Rectal Q6H PRN    polyethylene glycol (MIRALAX) packet 17 g  17 g Oral DAILY PRN    ondansetron (ZOFRAN ODT) tablet 4 mg  4 mg Oral Q8H PRN    Or    ondansetron (ZOFRAN) injection 4 mg  4 mg IntraVENous Q6H PRN    glucose chewable tablet 16 g  4 Tablet Oral PRN    dextrose (D50W) injection syrg 12.5-25 g  25-50 mL IntraVENous PRN    glucagon (GLUCAGEN) injection 1 mg  1 mg IntraMUSCular PRN    potassium chloride SR (KLOR-CON 10) tablet 40 mEq  40 mEq Oral BID    insulin lispro (HUMALOG) injection   SubCUTAneous AC&HS    dextrose (D50W) injection syrg 12.5-25 g  12.5-25 g IntraVENous PRN    iron sucrose (VENOFER) injection 200 mg  200 mg IntraVENous Q24H    glucose chewable tablet 16 g  4 Tablet Oral PRN    dextrose (D50W) injection syrg 12.5-25 g  25-50 mL IntraVENous PRN    glucagon (GLUCAGEN) injection 1 mg  1 mg IntraMUSCular PRN    insulin glargine (LANTUS) injection 18 Units  0.2 Units/kg SubCUTAneous DAILY    heparin (porcine) injection 5,000 Units 5,000 Units SubCUTAneous Q8H    benzonatate (TESSALON) capsule 200 mg  200 mg Oral TID PRN     ______________________________________________________________________  EXPECTED LENGTH OF STAY: 3d 19h  ACTUAL LENGTH OF STAY:          2                 Camryn Gray MD

## 2022-02-04 ENCOUNTER — TELEPHONE (OUTPATIENT)
Dept: CARDIOLOGY CLINIC | Age: 77
End: 2022-02-04

## 2022-02-04 LAB
ALBUMIN SERPL-MCNC: 3.2 G/DL (ref 3.5–5)
ALBUMIN/GLOB SERPL: 1 {RATIO} (ref 1.1–2.2)
ALP SERPL-CCNC: 178 U/L (ref 45–117)
ALT SERPL-CCNC: 18 U/L (ref 12–78)
ANION GAP SERPL CALC-SCNC: 5 MMOL/L (ref 5–15)
APPEARANCE UR: CLEAR
AST SERPL-CCNC: 74 U/L (ref 15–37)
BACTERIA URNS QL MICRO: NEGATIVE /HPF
BASOPHILS # BLD: 0 K/UL (ref 0–0.1)
BASOPHILS NFR BLD: 1 % (ref 0–1)
BILIRUB SERPL-MCNC: 1.2 MG/DL (ref 0.2–1)
BILIRUB UR QL: NEGATIVE
BNP SERPL-MCNC: 3356 PG/ML
BUN SERPL-MCNC: 22 MG/DL (ref 6–20)
BUN/CREAT SERPL: 18 (ref 12–20)
CALCIUM SERPL-MCNC: 8.8 MG/DL (ref 8.5–10.1)
CHLORIDE SERPL-SCNC: 103 MMOL/L (ref 97–108)
CO2 SERPL-SCNC: 29 MMOL/L (ref 21–32)
COLOR UR: NORMAL
CREAT SERPL-MCNC: 1.24 MG/DL (ref 0.7–1.3)
DIFFERENTIAL METHOD BLD: ABNORMAL
EOSINOPHIL # BLD: 0.1 K/UL (ref 0–0.4)
EOSINOPHIL NFR BLD: 3 % (ref 0–7)
EPITH CASTS URNS QL MICRO: NORMAL /LPF
ERYTHROCYTE [DISTWIDTH] IN BLOOD BY AUTOMATED COUNT: 18.2 % (ref 11.5–14.5)
GLOBULIN SER CALC-MCNC: 3.1 G/DL (ref 2–4)
GLUCOSE BLD STRIP.AUTO-MCNC: 120 MG/DL (ref 65–117)
GLUCOSE BLD STRIP.AUTO-MCNC: 152 MG/DL (ref 65–117)
GLUCOSE BLD STRIP.AUTO-MCNC: 178 MG/DL (ref 65–117)
GLUCOSE BLD STRIP.AUTO-MCNC: 68 MG/DL (ref 65–117)
GLUCOSE BLD STRIP.AUTO-MCNC: 76 MG/DL (ref 65–117)
GLUCOSE SERPL-MCNC: 66 MG/DL (ref 65–100)
GLUCOSE UR STRIP.AUTO-MCNC: NEGATIVE MG/DL
HCT VFR BLD AUTO: 31.9 % (ref 36.6–50.3)
HGB BLD-MCNC: 10.2 G/DL (ref 12.1–17)
HGB UR QL STRIP: NEGATIVE
HYALINE CASTS URNS QL MICRO: NORMAL /LPF (ref 0–5)
IMM GRANULOCYTES # BLD AUTO: 0 K/UL (ref 0–0.04)
IMM GRANULOCYTES NFR BLD AUTO: 0 % (ref 0–0.5)
KETONES UR QL STRIP.AUTO: NEGATIVE MG/DL
LEUKOCYTE ESTERASE UR QL STRIP.AUTO: NEGATIVE
LYMPHOCYTES # BLD: 0.9 K/UL (ref 0.8–3.5)
LYMPHOCYTES NFR BLD: 26 % (ref 12–49)
MAGNESIUM SERPL-MCNC: 1.8 MG/DL (ref 1.6–2.4)
MCH RBC QN AUTO: 23.1 PG (ref 26–34)
MCHC RBC AUTO-ENTMCNC: 32 G/DL (ref 30–36.5)
MCV RBC AUTO: 72.2 FL (ref 80–99)
MONOCYTES # BLD: 0.5 K/UL (ref 0–1)
MONOCYTES NFR BLD: 15 % (ref 5–13)
NEUTS SEG # BLD: 1.9 K/UL (ref 1.8–8)
NEUTS SEG NFR BLD: 56 % (ref 32–75)
NITRITE UR QL STRIP.AUTO: NEGATIVE
NRBC # BLD: 0 K/UL (ref 0–0.01)
NRBC BLD-RTO: 0 PER 100 WBC
PH UR STRIP: 6.5 [PH] (ref 5–8)
PLATELET # BLD AUTO: 125 K/UL (ref 150–400)
POTASSIUM SERPL-SCNC: 3.9 MMOL/L (ref 3.5–5.1)
PROT SERPL-MCNC: 6.3 G/DL (ref 6.4–8.2)
PROT UR STRIP-MCNC: NEGATIVE MG/DL
RBC # BLD AUTO: 4.42 M/UL (ref 4.1–5.7)
RBC #/AREA URNS HPF: NORMAL /HPF (ref 0–5)
SERVICE CMNT-IMP: ABNORMAL
SERVICE CMNT-IMP: NORMAL
SERVICE CMNT-IMP: NORMAL
SODIUM SERPL-SCNC: 137 MMOL/L (ref 136–145)
SP GR UR REFRACTOMETRY: 1.01 (ref 1–1.03)
UA: UC IF INDICATED,UAUC: NORMAL
UROBILINOGEN UR QL STRIP.AUTO: 1 EU/DL (ref 0.2–1)
WBC # BLD AUTO: 3.3 K/UL (ref 4.1–11.1)
WBC URNS QL MICRO: NORMAL /HPF (ref 0–4)

## 2022-02-04 PROCEDURE — 85025 COMPLETE CBC W/AUTO DIFF WBC: CPT

## 2022-02-04 PROCEDURE — 74011250637 HC RX REV CODE- 250/637: Performed by: NURSE PRACTITIONER

## 2022-02-04 PROCEDURE — 94761 N-INVAS EAR/PLS OXIMETRY MLT: CPT

## 2022-02-04 PROCEDURE — 83880 ASSAY OF NATRIURETIC PEPTIDE: CPT

## 2022-02-04 PROCEDURE — 99233 SBSQ HOSP IP/OBS HIGH 50: CPT | Performed by: INTERNAL MEDICINE

## 2022-02-04 PROCEDURE — APPSS45 APP SPLIT SHARED TIME 31-45 MINUTES: Performed by: NURSE PRACTITIONER

## 2022-02-04 PROCEDURE — 94660 CPAP INITIATION&MGMT: CPT

## 2022-02-04 PROCEDURE — 80053 COMPREHEN METABOLIC PANEL: CPT

## 2022-02-04 PROCEDURE — 74011636637 HC RX REV CODE- 636/637: Performed by: HOSPITALIST

## 2022-02-04 PROCEDURE — 97161 PT EVAL LOW COMPLEX 20 MIN: CPT

## 2022-02-04 PROCEDURE — 36415 COLL VENOUS BLD VENIPUNCTURE: CPT

## 2022-02-04 PROCEDURE — 82272 OCCULT BLD FECES 1-3 TESTS: CPT

## 2022-02-04 PROCEDURE — 82962 GLUCOSE BLOOD TEST: CPT

## 2022-02-04 PROCEDURE — 65660000001 HC RM ICU INTERMED STEPDOWN

## 2022-02-04 PROCEDURE — 74011000250 HC RX REV CODE- 250: Performed by: STUDENT IN AN ORGANIZED HEALTH CARE EDUCATION/TRAINING PROGRAM

## 2022-02-04 PROCEDURE — P9047 ALBUMIN (HUMAN), 25%, 50ML: HCPCS | Performed by: NURSE PRACTITIONER

## 2022-02-04 PROCEDURE — 74011250636 HC RX REV CODE- 250/636: Performed by: HOSPITALIST

## 2022-02-04 PROCEDURE — 97116 GAIT TRAINING THERAPY: CPT

## 2022-02-04 PROCEDURE — 74011250636 HC RX REV CODE- 250/636: Performed by: NURSE PRACTITIONER

## 2022-02-04 PROCEDURE — 81001 URINALYSIS AUTO W/SCOPE: CPT

## 2022-02-04 PROCEDURE — 74011250637 HC RX REV CODE- 250/637: Performed by: STUDENT IN AN ORGANIZED HEALTH CARE EDUCATION/TRAINING PROGRAM

## 2022-02-04 PROCEDURE — 83735 ASSAY OF MAGNESIUM: CPT

## 2022-02-04 RX ORDER — MILRINONE LACTATE 0.2 MG/ML
0.38 INJECTION, SOLUTION INTRAVENOUS CONTINUOUS
Qty: 500 ML | Refills: 1 | Status: ON HOLD
Start: 2022-02-04 | End: 2022-03-03 | Stop reason: SDUPTHER

## 2022-02-04 RX ORDER — POTASSIUM CHLORIDE 750 MG/1
20 TABLET, FILM COATED, EXTENDED RELEASE ORAL
Status: COMPLETED | OUTPATIENT
Start: 2022-02-04 | End: 2022-02-04

## 2022-02-04 RX ORDER — POTASSIUM CHLORIDE 1500 MG/1
40 TABLET, FILM COATED, EXTENDED RELEASE ORAL 2 TIMES DAILY
Qty: 120 TABLET | Refills: 2 | Status: SHIPPED
Start: 2022-02-04 | End: 2022-03-08 | Stop reason: SDUPTHER

## 2022-02-04 RX ORDER — BUMETANIDE 2 MG/1
2 TABLET ORAL 3 TIMES DAILY
Qty: 90 TABLET | Refills: 1 | Status: SHIPPED
Start: 2022-02-04 | End: 2022-02-11

## 2022-02-04 RX ADMIN — POTASSIUM CHLORIDE 40 MEQ: 750 TABLET, FILM COATED, EXTENDED RELEASE ORAL at 08:56

## 2022-02-04 RX ADMIN — RANOLAZINE 500 MG: 500 TABLET, FILM COATED, EXTENDED RELEASE ORAL at 09:03

## 2022-02-04 RX ADMIN — PRASUGREL 10 MG: 10 TABLET, FILM COATED ORAL at 08:55

## 2022-02-04 RX ADMIN — ISOSORBIDE MONONITRATE 30 MG: 30 TABLET, EXTENDED RELEASE ORAL at 08:53

## 2022-02-04 RX ADMIN — RANOLAZINE 500 MG: 500 TABLET, FILM COATED, EXTENDED RELEASE ORAL at 22:12

## 2022-02-04 RX ADMIN — BUMETANIDE 2 MG: 1 TABLET ORAL at 22:06

## 2022-02-04 RX ADMIN — SODIUM CHLORIDE, PRESERVATIVE FREE 10 ML: 5 INJECTION INTRAVENOUS at 06:14

## 2022-02-04 RX ADMIN — Medication 2 UNITS: at 11:45

## 2022-02-04 RX ADMIN — ASPIRIN 81 MG: 81 TABLET, COATED ORAL at 08:55

## 2022-02-04 RX ADMIN — INSULIN GLARGINE 18 UNITS: 100 INJECTION, SOLUTION SUBCUTANEOUS at 08:52

## 2022-02-04 RX ADMIN — ISOSORBIDE MONONITRATE 30 MG: 30 TABLET, EXTENDED RELEASE ORAL at 22:06

## 2022-02-04 RX ADMIN — HEPARIN SODIUM 5000 UNITS: 5000 INJECTION INTRAVENOUS; SUBCUTANEOUS at 22:06

## 2022-02-04 RX ADMIN — TAMSULOSIN HYDROCHLORIDE 0.4 MG: 0.4 CAPSULE ORAL at 08:53

## 2022-02-04 RX ADMIN — ALBUMIN (HUMAN) 12.5 G: 0.25 INJECTION, SOLUTION INTRAVENOUS at 08:54

## 2022-02-04 RX ADMIN — HYDRALAZINE HYDROCHLORIDE 50 MG: 50 TABLET, FILM COATED ORAL at 22:06

## 2022-02-04 RX ADMIN — MILRINONE LACTATE IN DEXTROSE 0.38 MCG/KG/MIN: 200 INJECTION, SOLUTION INTRAVENOUS at 13:33

## 2022-02-04 RX ADMIN — POTASSIUM CHLORIDE 20 MEQ: 750 TABLET, FILM COATED, EXTENDED RELEASE ORAL at 08:53

## 2022-02-04 RX ADMIN — ROSUVASTATIN CALCIUM 10 MG: 10 TABLET, COATED ORAL at 22:06

## 2022-02-04 RX ADMIN — PANTOPRAZOLE SODIUM 40 MG: 40 TABLET, DELAYED RELEASE ORAL at 06:35

## 2022-02-04 RX ADMIN — POTASSIUM CHLORIDE 40 MEQ: 750 TABLET, FILM COATED, EXTENDED RELEASE ORAL at 17:17

## 2022-02-04 RX ADMIN — EPLERENONE 50 MG: 25 TABLET, FILM COATED ORAL at 08:52

## 2022-02-04 RX ADMIN — BUMETANIDE 2 MG: 1 TABLET ORAL at 08:53

## 2022-02-04 RX ADMIN — HEPARIN SODIUM 5000 UNITS: 5000 INJECTION INTRAVENOUS; SUBCUTANEOUS at 13:28

## 2022-02-04 RX ADMIN — SODIUM CHLORIDE, PRESERVATIVE FREE 10 ML: 5 INJECTION INTRAVENOUS at 16:20

## 2022-02-04 RX ADMIN — SODIUM CHLORIDE, PRESERVATIVE FREE 10 ML: 5 INJECTION INTRAVENOUS at 22:08

## 2022-02-04 RX ADMIN — BUMETANIDE 2 MG: 1 TABLET ORAL at 17:17

## 2022-02-04 RX ADMIN — HYDRALAZINE HYDROCHLORIDE 50 MG: 50 TABLET, FILM COATED ORAL at 17:17

## 2022-02-04 RX ADMIN — MILRINONE LACTATE IN DEXTROSE 0.38 MCG/KG/MIN: 200 INJECTION, SOLUTION INTRAVENOUS at 03:15

## 2022-02-04 RX ADMIN — HYDRALAZINE HYDROCHLORIDE 50 MG: 50 TABLET, FILM COATED ORAL at 08:53

## 2022-02-04 NOTE — PROGRESS NOTES
6-Minute Walk: Pt on room air at rest SpO2-99%, HR-77. Pt. SpO2 on room air with ambulation SpO2-93%, HR-105. Post ambulation SpO2-99%, HR-82. No dyspnea noted and pt reports he feels fine. No supplemental O2 required. Total distance of ambulation: 450ft.

## 2022-02-04 NOTE — PROGRESS NOTES
PHYSICAL THERAPY EVALUATION/DISCHARGE  Patient: Haily Chaves (71 y.o. male)  Date: 2/4/2022  Primary Diagnosis: CHF (congestive heart failure) ([facility] [address] [I50.9]       Precautions:   Fall      ASSESSMENT  Based on the objective data described below, the patient presents with decreased endurance following admission for CHF. Received supine in bed, agreeable to therapy. Overall mod I for mobility but benefited from education regarding pacing and stair management. Discussed pursed lip breathing and taking short, very frequent rest breaks during the day to avoid significant SOB that requires increased energy to recover from. No PT needs at d/c. Functional Outcome Measure: The patient scored 27/28 on the Tinetti outcome measure which is indicative of low fall risk. Other factors to consider for discharge: mod I     Further skilled acute physical therapy is not indicated at this time. PLAN :  Recommendation for discharge: (in order for the patient to meet his/her long term goals)  No skilled physical therapy/ follow up rehabilitation needs identified at this time. This discharge recommendation:  Has been made in collaboration with the attending provider and/or case management    IF patient discharges home will need the following DME: none       SUBJECTIVE:   Patient stated I'm a little short of breath.     OBJECTIVE DATA SUMMARY:   HISTORY:    Past Medical History:   Diagnosis Date    CAD (coronary artery disease) '90  '97, '13 x 2    MI, code ice in 2013 at [facility]    Calculus of kidney     Colonic polyps     Congestive heart failure, unspecified     Diabetes ([facility] [address]     Gastritis     Hypercholesterolemia     Sleep apnea      Past Surgical History:   Procedure Laterality Date    COLONOSCOPY  04/06/2011    16, due 21    ENDOSCOPY, COLON, DIAGNOSTIC      11, due 16    HX CORONARY ARTERY BYPASS GRAFT  8/22/12    x 4 vessel by S.  James    HX HEENT      LASIK    HX PACEMAKER PLACEMENT  1/30/13    GA CARDIAC SURG PROCEDURE UNLIST  2012    x 4 vessels       Prior level of function: independent, no falls, lives with wife, fiercly independent  Personal factors and/or comorbidities impacting plan of care: PMH    Home Situation  Home Environment: Private residence  # Steps to Enter: 1  One/Two Story Residence: Split level  # of Interior Steps: 7  Interior Rails: Right  Lift Chair Available: No  Living Alone: No  Support Systems: Spouse/Significant Other  Patient Expects to be Discharged to[de-identified] Home with family assistance  Current DME Used/Available at Home: Cane, straight    EXAMINATION/PRESENTATION/DECISION MAKING:   Critical Behavior:  Neurologic State: Alert  Orientation Level: Oriented X4  Cognition: Appropriate decision making,Appropriate for age attention/concentration,Appropriate safety awareness  Range Of Motion:  AROM: Within functional limits  Strength:    Strength:  Within functional limits  Tone & Sensation:   Tone: Normal  Sensation: Impaired (neuropathy B feet)  Coordination:  Coordination: Within functional limits  Functional Mobility:  Bed Mobility:  Supine to Sit: Modified independent  Scooting: Modified independent  Transfers:  Sit to Stand: Modified independent  Stand to Sit: Independent  Balance:   Sitting: Intact  Standing: Intact  Ambulation/Gait Training:  Distance (ft): 120 Feet (ft)  Ambulation - Level of Assistance: Modified independent  Gait Abnormalities: Decreased step clearance  Speed/Charlene: Pace decreased (<100 feet/min)  Step Length: Right shortened;Left shortened   Stairs:  Number of Stairs Trained: 4  Stairs - Level of Assistance: Supervision   Rail Use: Left       Functional Measure:  Tinetti test:    Sitting Balance: 1  Arises: 1  Attempts to Rise: 2  Immediate Standing Balance: 2  Standing Balance: 2  Nudged: 2  Eyes Closed: 1  Turn 360 Degrees - Continuous/Discontinuous: 1  Turn 360 Degrees - Steady/Unsteady: 1  Sitting Down: 2  Balance Score: 15 Balance total score  Indication of Gait: 1  R Step Length/Height: 1  L Step Length/Height: 1  R Foot Clearance: 1  L Foot Clearance: 1  Step Symmetry: 1  Step Continuity: 1  Path: 2  Trunk: 2  Walking Time: 1  Gait Score: 12 Gait total score  Total Score: 27/28 Overall total score         Tinetti Tool Score Risk of Falls  <19 = High Fall Risk  19-24 = Moderate Fall Risk  25-28 = Low Fall Risk  Tinetti ME. Performance-Oriented Assessment of Mobility Problems in Elderly Patients. [address]; [id number]. (Scoring Description: PT Bulletin Feb. 10, 1993)    Older adults: Nikki Guajardo et al, 2009; n = 1000 [facility] elderly evaluated with ABC, GERA, ADL, and IADL)  · Mean GERA score for males aged 69-68 years = 26.21(3.40)  · Mean GERA score for females age 69-68 years = 25.16(4.30)  · Mean GERA score for males over 80 years = 23.29(6.02)  · Mean GERA score for females over 80 years = 17.20(8.32)            Physical Therapy Evaluation Charge Determination   History Examination Presentation Decision-Making   HIGH Complexity :3+ comorbidities / personal factors will impact the outcome/ POC  HIGH Complexity : 4+ Standardized tests and measures addressing body structure, function, activity limitation and / or participation in recreation  LOW Complexity : Stable, uncomplicated  Other outcome measures Tinetti 27/28  LOW       Based on the above components, the patient evaluation is determined to be of the following complexity level: LOW     Pain Rating:  Denied pain    Activity Tolerance:   Good      After treatment patient left in no apparent distress:   Sitting in chair and Call bell within reach    COMMUNICATION/EDUCATION:   The patients plan of care was discussed with: Registered nurse. Fall prevention education was provided and the patient/caregiver indicated understanding., Patient/family have participated as able in goal setting and plan of care. and Patient/family agree to work toward stated goals and plan of care.     Thank you for this referral.  Opal Azevedo José Luis Christianson, PT, DPT   Time Calculation: 21 mins

## 2022-02-04 NOTE — PROGRESS NOTES
[facility] in [address] [place], [place]  Inpatient Progress Note      Patient name: Mikel Inman  Patient : 1945  Patient MRN: [id number]  Consulting MD: Eze Carmona MD  Date of service: 22    REASON FOR REFERRAL:  Chronic systolic heart failure    PLAN OF CARE:  · 70 y/o male with h/o severe ischemic cardiomyopathy, LVEF 20-25%; stage D, NYHA class IV symptoms on chronic inotropic support with milrinone gtt; patient unerwent LVAD-DT evaluation which was placed on hold due to discovery of lung nodule posittive for adenocarcinoma which required radiation therapy and ICD explant/reimplant; patient did not complete evaluation (EGD/C-scope needed) as he has not been decided if he would like to proceed with LVAD  · Patient now admitted for acute decompensation of heart failure after recent ?pneumonia treated with Z-pack underwent diuresis; patient remains undecided about LVAD therapy; will ambulate today and assess readiness for discharge    RECOMMENDATIONS/PLAN:  Continue current medical therapy for heart failure  Continue milrinone 0.375 mcg/kg/min - dose based on 84.5 kg. Please do standing weight daily   Intolerant Coreg due to RV dysfunction   ACEi/ARB/ARNi previously discontinued in anticipation of possible cardiac surgery  Continue current dose of eplerenone 50 mg daily  Order Potassium 20 mEq po once in addition to daily dose: K level 3.9  Continue Potassium 40 mEq po BID  Continue Farxiga 10 mg daily- not on formuary. May need to bring medication from home if not DC today  Continue current dose of Hydralazine 50 TID and Imdur 30 mg twice daily  Continue Bumex 2 mg po TID. Home dose bumex 1mg daily; occasionally misses doses.  Goal weight < 190  Ordered UA, Stool blood for today  Low Iron, given Venofer 200 mg daily X 2 doses  Low albumin @2.9, Continue daily albumin infusion  Continue ASA 81 mg daily   Continue Effient 10 mg po daily  Continue current dose of rosuvastatin 10 mg daily  Continue ranolazine 500 mg BID (for HR and angina control)  Ordered CPAP therapy - uses most of the time at home  Consulted Case Management for discharge planning. Also placed Home Health Orders  Continue Cardiac Rehab OP  Encouraged to walk the hallways with nursing and sit in the chair during the day. PT ordered for eval & treat  Patient with pain in left leg, Duplex showed No evidence of right or left lower extremity vein thrombosis. Advanced care plan forms completed   Diabetes with A1c 6.8%  on 1/11/22; consulted Diabetes management; appreciate recs  Vit D level normal, patient does not need to continue Vitamin D supplements at home   Genetic test negative   Ordered AICD interrogation   Reinforced heart healthy, low salt diet  Reinforced appropriate fluid intake of 6 x 8oz glasses of water per day  Monitor and record daily standing weights and strict I/O's  Follow-up with primary cardiologist Dr. Mackay Memory   Follow-up with F on 2/8 at 9 am virtual apt    Rest per primary team      LIFE GOALS:  Patient's personal goals include: breath better. Had been SOB for several days  Important upcoming milestones or family events:bothing special yet  The patient identifies the following as important for living well: be able to do activities like walking w/o SOB. IMPRESSION:  Fatigue at rest  Shortness of breath with exertion  Acute on chronic systolic heart failure  · Stage D, NYHA class IV symptoms improved to class II on inotropes  · Non-ischemic cardiomyopathy, LVEF 20% with LVEDD 6.2  · RV dysfunction, TAPSE 1.22 (prelimnary read)  · TBili 1.5 likely from cardiac congestion  At risk of sudden cardiac death  · Recent cardiac arrest   · S/p ICD (1/2013, [employer] followed by [place]);  New implant on 10/21/2021 [employer] Vigilant  Coronary artery disease  · S/p multiple interventions  · S/p 4V CABG (8/2012)  · LHC (7/2019) severe stenosis of LIMA to LAD anastomosis site, SVG graft to OM is occluded, SVG graft to RCA 40-50%, LAD occluded proximally, severe proximal LCx, RCA is the long . · PET (6/2019) EF 24% with anterior lateral and inferior reversible defect  Cardiac risk factors  · HTN  · HL  · DM2  · SRUTHI on CPAP  · MIld carotid stenosis  Anemia, microcytic  · Iron deficiency  DVT with filter  Pulmonary nodules   Dysphagia     CARDIAC IMAGING:  ECHO ( 2/2/22):   Left Ventricle: Left ventricle is moderately dilated. Mildly increased wall thickness. Severe global hypokinesis present. Severely reduced left ventricular systolic function with a visually estimated EF of 15 - 20%. Grade II diastolic dysfunction with increased LAP.   Right Ventricle: Right ventricle is mildly dilated. Mildly reduced systolic function.   Aortic Valve: Mild sclerosis of the aortic valve cusps. Mild transvalvular regurgitation.   Mitral Valve: Mild transvalvular regurgitation.   Tricuspid Valve: Moderate transvalvular regurgitation. Mildly elevated RVSP. RVSP is 39 mmHg.   Left Atrium: Left atrium is moderately dilated. Echo (11/23/21):  · LV 15-20%. · Mod-severe, MR, mod-TR     Echo (5/20/21)  · LV: Estimated LVEF is 20 - 25%. Normal wall thickness. Mildly dilated left ventricle. Severely and globally reduced systolic function. Severe (grade 4) left ventricular diastolic dysfunction. · LA: Severely dilated left atrium. · RA: Severely dilated right atrium. · MV: Moderate mitral valve regurgitation is present. · TV: Moderate tricuspid valve regurgitation is present. · AO: Mild aortic root dilatation. · RV: Pacer/ICD present. · LVED 6.2cm     EKG (5/20/21) SB with 1degree AV block, QRS 116ms     LHC (5/24/21) Native Coronaries: LM - moderate to severe distal disease 50%. % prox occluded (), heavily calcified, LCx: 80% proximal stenosis. OM1 is  (seen filling faintly via collaterals). OM2 99% stenosis. RCA: 100% proximal occluded.  LIMA to LAD: patent, supplies only a diagonal branch (probably D2). The LAD distal to the bifurcation is 100% occluded () and fills via right to left collaterals off the SVG to RCA. SVG to R-PDA: patent with moderate diffuse irregularities but no obstructive disease in the graft.    No appealing interventional targets.      NST as above     ICD Interrogation (11/12/2021) [employer] VVI, thoracic impedence trending up, no events, normal device function and good battery  ICD interrogation (5/12/21) [address] single lead, no events, normal device function and good battery     HEMODYNAMICS:  RHC 5/24/21 CI 1.97, PA 33/9/17, RA 1, PCWP 6- off milrinone   CPEST not done     Patient underwent a 6 minute walk test 11/12/2021     6 Min Walk Report 11/12/2021 7/6/2021 5/20/2021   (PRE) HR 88 98 74   (PRE) O2 Sat 100 - 99   (POST)  - 81   (POST) O2 Sat 99 98% on Ra 99   Distance in Meters 386.58 - 1158.24            OTHER IMAGING:  CXR Results  (Last 48 hours)    None        CXR (1/24/22): FINDINGS: PA and lateral radiographs of the chest demonstrate a new infiltrate  in the apical segment of the left lower lobe. The cardiac and mediastinal  contours and pulmonary vascularity are stable. The bones and soft tissues are  within normal limits. IMPRESSION: Left lower lobe pneumonia    CXR (6/17/21) clear    Duplex LE venous Bilal (2/2/2021)  Right Lower Venous  Varicose vein(s) present. Varicose veins are patent. The common femoral, great saphenous, profunda femoral, femoral, popliteal, gastrocnemius, posterior tibial, peroneal and saphenous femoral junction vein(s) were imaged in the transverse and longitudinal planes. The vessels showed normal color filling and compressibility. Doppler interrogation of the veins showed phasic and spontaneous flow. Left Lower Venous  Varicose vein(s) present. Varicose veins are patent.    The common femoral, great saphenous, saphenous femoral junction, profunda femoral, femoral, popliteal, gastrocnemius, posterior tibial and peroneal vein(s) were imaged in the transverse and longitudinal planes. The vessels showed normal color filling and compressibility. Doppler interrogation of the veins showed phasic and spontaneous flow. CT chest (2/1/22)  1. No pulmonary emboli. 2. Pleural effusions. 3. Treated RAVEN carcinoma as discussed.     CT 5/21/21 1. Irregular pulmonary nodule in the left upper lobe measuring 2 cm, suspicious for primary pulmonary malignancy. 2. Additional 6 mm left upper lobe pulmonary nodule. 3. Severe calcific atherosclerosis of the coronary arteries and abdominal aorta. No aneurysm. 4. Cardiomegaly. 5. No acute abnormality within the chest, abdomen, or pelvis. INTERVAL HISTORY:  Patient in no acute distress  Patient stated \" feel better than ever\"  Urinating Well  No CP; Cr 1.15, K+:3.8, NT-ProBNP 4457 trending down  Plan to DC if stable this afternoon    PHYSICAL EXAM:  Visit Vitals  /62 (BP 1 Location: Left arm, BP Patient Position: At rest)   Pulse 78   Temp 97.4 °F (36.3 °C)   Resp 18   Ht 6' 1\" (1.854 m)   Wt 181 lb 14.1 oz (82.5 kg)   SpO2 99%   BMI 24.00 kg/m²     General: Patient is well developed, well-nourished in no acute distress  HEENT: Normocephalic and atraumatic. No scleral icterus. Pupils are equal, round. No conjunctival injection. Oropharynx is clear. Neck: Supple. No evidence of thyroid enlargements or lymphadenopathy. JVD: Cannot be appreciated   Lungs: Breath sounds are equal and clear bilaterally. No wheezes, rhonchi, or rales. Heart: Regular rate and rhythm with normal S1 and S2. No murmurs, gallops or rubs. Abdomen: Soft, no mass or tenderness. No organomegaly or hernia. Bowel sounds present. Genitourinary and rectal: deferred  Extremities: No cyanosis, clubbing, or edema. Neurologic: No focal sensory or motor deficits are noted. Grossly intact. Psychiatric: Awake, alert an doriented x 3. Appropriate mood and affect.   Skin: Warm, dry and well perfused. No lesions, nodules or rashes are noted. REVIEW OF SYSTEMS:  General: Denies fever, night sweats. Ear, nose and throat: Denies difficulty hearing, sinus problems, runny nose, post-nasal drip, ringing in ears, mouth sores, loose teeth, ear pain, nosebleeds, sore throate, facial pain or numbess  Cardiovascular: see above in the interval history  Respiratory: Denies cough, wheezing, sputum production, hemoptysis. +SOB  Gastrointestinal: Denies heartburn, constipation, intolerance to certain foods, diarrhea, abdominal pain, nausea, vomiting, difficulty swallowing, blood in stool  Kidney and bladder: Denies painful urination, frequent urination, urgency, prostate problems and impotence  Musculoskeletal: Denies joint pain, muscle weakness  Skin and hair: Denies change in existing skin lesions, hair loss or increase, breast changes    PAST MEDICAL HISTORY:  Past Medical History:   Diagnosis Date    CAD (coronary artery disease) '90  '97, '13 x 2    MI, code ice in 2013 at [facility]    Calculus of kidney     Colonic polyps     Congestive heart failure, unspecified     Diabetes ([facility] [address]     Gastritis     Hypercholesterolemia     Sleep apnea        PAST SURGICAL HISTORY:  Past Surgical History:   Procedure Laterality Date    COLONOSCOPY  04/06/2011    16, due 21    ENDOSCOPY, COLON, DIAGNOSTIC      11, due 16    HX CORONARY ARTERY BYPASS GRAFT  8/22/12    x 4 vessel by S.  James    HX HEENT      LASIK    HX PACEMAKER PLACEMENT  1/30/13    MA CARDIAC SURG PROCEDURE UNLIST  2012    x 4 vessels       FAMILY HISTORY:  Family History   Problem Relation Age of Onset    Heart Disease Mother         MI    Heart Disease Father         CAD & PVD    Lung Disease Father     Cancer Father         lung    Diabetes Maternal Grandmother     Heart Disease Other         CAD    Stroke Sister        SOCIAL HISTORY:  Social History     Socioeconomic History    Marital status:    Tobacco Use    Smoking status: Never Smoker    Smokeless tobacco: Never Used   Vaping Use    Vaping Use: Never used   Substance and Sexual Activity    Alcohol use: Yes     Alcohol/week: 0.0 standard drinks     Comment:  VERY RARE    Drug use: No       LABORATORY RESULTS:     Labs Latest Ref Rng & Units 2/4/2022 2/3/2022 2/2/2022 2/2/2022 2/1/2022   WBC 4.1 - 11.1 K/uL 3. 3(L) 2. 6(L) - - 3. 0(L)   RBC 4.10 - 5.70 M/uL 4.42 4.38 - - 5.09   Hemoglobin 12.1 - 17.0 g/dL 10. 2(L) 10. 1(L) - - 11. 7(L)   Hematocrit 36.6 - 50.3 % 31. 9(L) 31. 4(L) - - 37.5   MCV 80.0 - 99.0 FL 72. 2(L) 71. 7(L) - - 73. 7(L)   Platelets 075 - 275 K/uL 125(L) 123(L) - - 144(L)   Lymphocytes 12 - 49 % 26 29 - - 21   Monocytes 5 - 13 % 15(H) 15(H) - - 11   Eosinophils 0 - 7 % 3 4 - - 1   Basophils 0 - 1 % 1 1 - - 1   Albumin 3.5 - 5.0 g/dL 3.2(L) 2. 9(L) - - 3.6   Calcium 8.5 - 10.1 MG/DL 8.8 8.6 9.1 8.8 9.6   Glucose 65 - 100 mg/dL 66 101(H) 200(H) 213(H) 141(H)   BUN 6 - 20 MG/DL 22(H) 20 21(H) 21(H) 22(H)   Creatinine 0.70 - 1.30 MG/DL 1.24 1.15 1.26 1.18 1.38(H)   Sodium 136 - 145 mmol/L 137 138 136 137 136   Potassium 3.5 - 5.1 mmol/L 3.9 3.8 3.6 3. 2(L) 3.9   Some recent data might be hidden     Lab Results   Component Value Date/Time    TSH 1.47 05/26/2021 10:44 PM    TSH 1.97 05/20/2021 04:28 PM    TSH 1.41 02/09/2021 03:05 PM    TSH 1.740 08/14/2018 09:58 AM    TSH 1.710 10/18/2017 12:00 AM       ALLERGY:  Allergies   Allergen Reactions    Oxycodone Anaphylaxis    Pcn [Penicillins] Hives        CURRENT MEDICATIONS:    Current Facility-Administered Medications:     albumin human 25% (BUMINATE) solution 12.5 g, 12.5 g, IntraVENous, DAILY, Malaika Broderick NP, Last Rate: 60 mL/hr at 02/04/22 0854, 12.5 g at 02/04/22 0854    bumetanide (BUMEX) tablet 2 mg, 2 mg, Oral, TID, Jeannette Samantha, NP, 2 mg at 02/04/22 0853    milrinone (PRIMACOR) 20 MG/100 ML D5W infusion, 0.375 mcg/kg/min, IntraVENous, CONTINUOUS, Malaika Broderick NP, Last Rate: 9.5 mL/hr at 02/04/22 0803, 0.375 mcg/kg/min at 02/04/22 0803    aspirin delayed-release tablet 81 mg, 81 mg, Oral, DAILY, Eva Pereira MD, 81 mg at 02/04/22 0855    eplerenone (INSPRA) tablet 50 mg, 50 mg, Oral, DAILY, Eva Pereira MD, 50 mg at 02/04/22 0852    hydrALAZINE (APRESOLINE) tablet 50 mg, 50 mg, Oral, TID, Eva Pereira MD, 50 mg at 02/04/22 0853    isosorbide mononitrate ER (IMDUR) tablet 30 mg, 30 mg, Oral, Q12H, Eva Pereira MD, 30 mg at 02/04/22 0853    pantoprazole (PROTONIX) tablet 40 mg, 40 mg, Oral, ACB, Eva Pereira MD, 40 mg at 02/04/22 0635    prasugreL (EFFIENT) tablet 10 mg, 10 mg, Oral, DAILY, Eva Pereira MD, 10 mg at 02/04/22 0855    ranolazine ER (RANEXA) tablet 500 mg, 500 mg, Oral, Q12H, Eva Pereira MD, 500 mg at 02/03/22 2111    rosuvastatin (CRESTOR) tablet 10 mg, 10 mg, Oral, QHS, Eva Pereira MD, 10 mg at 02/03/22 2111    tamsulosin (FLOMAX) capsule 0.4 mg, 0.4 mg, Oral, DAILY, Eva Pereira MD, 0.4 mg at 02/04/22 0853    sodium chloride (NS) flush 5-40 mL, 5-40 mL, IntraVENous, Q8H, Eva Pereira MD, 10 mL at 02/04/22 0614    sodium chloride (NS) flush 5-40 mL, 5-40 mL, IntraVENous, PRN, Eva Pereira MD    acetaminophen (TYLENOL) tablet 650 mg, 650 mg, Oral, Q6H PRN **OR** acetaminophen (TYLENOL) suppository 650 mg, 650 mg, Rectal, Q6H PRN, Eva Pereira MD    polyethylene glycol (MIRALAX) packet 17 g, 17 g, Oral, DAILY PRN, Eva Pereira MD    ondansetron (ZOFRAN ODT) tablet 4 mg, 4 mg, Oral, Q8H PRN **OR** ondansetron (ZOFRAN) injection 4 mg, 4 mg, IntraVENous, Q6H PRN, Eva Pereira MD    glucose chewable tablet 16 g, 4 Tablet, Oral, PRN, Eva Pereira MD    dextrose (D50W) injection syrg 12.5-25 g, 25-50 mL, IntraVENous, PRN, Eva Periera MD    glucagon (GLUCAGEN) injection 1 mg, 1 mg, IntraMUSCular, PRN, Eva Pereira MD    potassium chloride SR (KLOR-CON 10) tablet 40 mEq, 40 mEq, Oral, BID, Kimmy Ciarra, NP, 40 mEq at 02/04/22 0856    insulin lispro (HUMALOG) injection, , SubCUTAneous, AC&HS, Debkierra, Daisy Rollins MD, 2 Units at 02/03/22 1217    dextrose (D50W) injection syrg 12.5-25 g, 12.5-25 g, IntraVENous, PRN, Debkierra CTBJHCREGGIE MONTERO MD    glucose chewable tablet 16 g, 4 Tablet, Oral, PRN, Justin TUNTUVX MD WINSTON    dextrose (D50W) injection syrg 12.5-25 g, 25-50 mL, IntraVENous, PRN, Debeb, WBJDUYM MD W[place]ON    glucagon (GLUCAGEN) injection 1 mg, 1 mg, IntraMUSCular, PRN, Justin FFAREGGIEZLEANNE MONTERO MD    insulin glargine (LANTUS) injection 18 Units, 0.2 Units/kg, SubCUTAneous, DAILY, DebHÉCTOR lozada MD, 18 Units at 02/04/22 0852    heparin (porcine) injection 5,000 Units, 5,000 Units, SubCUTAneous, Q8H, OLGA Anderson MD, 5,000 Units at 02/03/22 2111    benzonatate (TESSALON) capsule 200 mg, 200 mg, Oral, TID PRN, Titus Kilpatrick MD, 200 mg at 02/02/22 1858    PATIENT CARE TEAM:  Patient Care Team:  Dina Ballesteros MD as PCP - General (Internal Medicine)  Dina Ballesteros MD as PCP - [facility]  Bereket Schmid MD as Physician (Cardiology)  Brian Degroot MD as Physician (Cardiology)  Cherelle Low MD as Physician (Gastroenterology)  Mal Steele MD as Physician (Orthopedic Surgery)  Arabella Huntley MD as Physician (Ophthalmology)  Adriane Carte Wilms, MD as Physician ([address])  Tano Simeon MD (Cardiology)  Chivo Causey MD (Cardiology)       Thank you for your referral and allowing me to participate in this patient's care. Maren Baugh DNP, AGAP-BC, PCCN, [facility]  Heart Failure Nurse Practitioner   [facility]   [address] [place] [address] [facility] sara, [address]  W: [phone]/ F: [phone]          [facility] ATTENDING ADDENDUM    Patient was seen and examined in person. Data and notes were reviewed.  I have discussed and agree with the plan as noted in the NP note above without further additions.     Jill Guardado MD PhD  Kitty Kohler

## 2022-02-04 NOTE — DISCHARGE SUMMARY
Discharge Summary       PATIENT ID: Miriam Dickson  MRN: [id number]   YOB: 1945    DATE OF ADMISSION: 2/1/2022  5:04 PM    DATE OF DISCHARGE: 2/4/2022    PRIMARY CARE PROVIDER: Roddy Perez MD     ATTENDING PHYSICIAN: Polo Mansfield MD   DISCHARGING PROVIDER: Polo Mansfield MD    To contact this individual call [phone] and ask the  to page. If unavailable ask to be transferred the Adult Hospitalist Department. CONSULTATIONS: IP CONSULT TO ADVANCED HEART FAILURE    PROCEDURES/SURGERIES: * No surgery found *    DISCHARGE DIAGNOSES:       Note:  based on current admission blood sugar ( glycemic control) and serial S Cr assessments; resumption of home diabetic regimen is reasonable;     Results for Terrence Anthony (MRN [id number]) as of 2/4/2022 12:35   11/12/2021 00:00 1/11/2022 11:34 2/1/2022 16:58 2/1/2022 19:35 2/2/2022 04:40   Glucose 62 (L)  141 (H)  213 (H)   Creatinine 1.03  1.38 (H)  1.18       Based on last AHF note: The plan follows:     RECOMMENDATIONS/PLAN:  Continue current medical therapy for heart failure  Continue milrinone 0.375 mcg/kg/min - dose based on 84.5 kg. Please do standing weight daily   Intolerant Coreg due to RV dysfunction   ACEi/ARB/ARNi previously discontinued in anticipation of possible cardiac surgery  Continue current dose of eplerenone 50 mg daily  Order Potassium 20 mEq po once in addition to daily dose: K level 3.9  Continue Potassium 40 mEq po BID  Continue Farxiga 10 mg daily- not on formuary. May need to bring medication from home if not DC today  Continue current dose of Hydralazine 50 TID and Imdur 30 mg twice daily  Continue Bumex 2 mg po TID. Home dose bumex 1mg daily; occasionally misses doses.  Goal weight < 190  Ordered UA, Stool blood for today  Low Iron, given Venofer 200 mg daily X 2 doses  Low albumin @2.9, Continue daily albumin infusion  Continue ASA 81 mg daily   Continue Effient 10 mg po daily  Continue current dose of rosuvastatin 10 mg daily  Continue ranolazine 500 mg BID (for HR and angina control)  Ordered CPAP therapy - uses most of the time at home  Consulted Case Management for discharge planning. Also placed [facility]  Encouraged to walk the hallways with nursing and sit in the chair during the day. PT ordered for eval & treat  Patient with pain in left leg, Duplex showed No evidence of right or left lower extremity vein thrombosis. Advanced care plan forms completed   Diabetes with A1c 6.8%  on 1/11/22; consulted Diabetes management; appreciate recs[ but resuming home regiment as a1c , </= to 7.0 Magaly Rosario MD 2/4/2022 ]  Vit D level normal, patient does not need to continue Vitamin D supplements at home   Genetic test negative   Ordered AICD interrogation   Reinforced heart healthy, low salt diet  Reinforced appropriate fluid intake of 6 x 8oz glasses of water per day  Monitor and record daily standing weights and strict I/O's  Follow-up with primary cardiologist Dr. Yuliet Wiseman   Follow-up with [facility] on 2/8 at 9 am virtual apt      Little to add to above     But of record; Patient presented with worsening dyspnea and edema. His history significant for severe ischemic cardiomyopathy with LVEF of 20-25%, stage D NYHA class IV who is on chronic palliative milrinone infusion. He is followed by advanced heart failure team discussion underway for LVAD placement.     Other significant PMH include type 2 diabetes, BPH, adenocarcinoma of the lung, completed radiation therapy. Per RT:     Katherine White, RT   Respiratory Therapist   Respiratory Therapy   Progress Notes      Addendum   Date of Service:  02/04/22 0917                       []Hide copied text    []Yordy for details    6-Minute Walk: Pt on room air at rest SpO2-99%, HR-77. Pt. SpO2 on room air with ambulation SpO2-93%, HR-105. Post ambulation SpO2-99%, HR-82. No dyspnea noted and pt reports he feels fine.  No supplemental O2 required. Total distance of ambulation: 450ft. Severe ischemic cardiomyopathy, CAD, history of MI   Acute on chronic systolic heart failure, NYHA class IV: POA. AICD in place. Patient being evaluated for LVAD  -Diuresis: Bumex 2 mg IV twice daily.  -Continue chronic palliative milrinone infusion.  -Continue current GDMT therapy.  -Daily weights  -Monitor and replete electrolytes  -Advanced heart failure team follow.  - good night for pt last pm      Noninsulin-dependent type 2 diabetes. Takes Metformin and Farxiga at home. A1c 6.8 on 1/11/2027  -SSI plus hypoglycemic protocols           Lab Results            DISCHARGE DIAGNOSES / PLAN:      As above     BMI: Body mass index is 24 kg/m². . This patient: Has a BMI within normal limits. PENDING TEST RESULTS:   At the time of discharge the following test results are still pending: na     ADDITIONAL CARE RECOMMENDATIONS:   Na     NOTIFY YOUR PHYSICIAN FOR ANY OF THE FOLLOWING:   Fever over 101 degrees for 24 hours. Chest pain, shortness of breath, fever, chills, nausea, vomiting, diarrhea, change in mentation, falling, weakness, bleeding. Severe pain or pain not relieved by medications, as well as any other signs or symptoms that you may have questions about. FOLLOW UP APPOINTMENTS:    Follow-up Information     Follow up With Specialties Details Why [address] Cardiology  [address] 2/8/2022 at p am virtual [address] [facility]   [address] [facility]    Nan Francisco MD Internal Medicine   [address] [facility]  [address] [facility] [phone]               DIET: Diabetic Diet low sodium as instructed in hospital     ACTIVITY: Activity as tolerated    EQUIPMENT needed: na     DISCHARGE MEDICATIONS:  Current Discharge Medication List      START taking these medications    Details   potassium chloride SR (K-TAB) 20 mEq tablet Take 2 Tablets by mouth two (2) times a day.  Qty: 120 Tablet, Refills: 2  Start date: 2/4/2022         CONTINUE these medications which have CHANGED    Details   bumetanide (BUMEX) 2 mg tablet Take 1 Tablet by mouth three (3) times daily. Qty: 90 Tablet, Refills: 1  Start date: 2/4/2022      milrinone (PRIMACOR) 20 mg/100 mL (200 mcg/mL) infusion 31.688 mcg/min by IntraVENous route continuous. Qty: 500 mL, Refills: 1  Start date: 2/4/2022         CONTINUE these medications which have NOT CHANGED    Details   prasugreL (Effient) 10 mg tablet Take 10 mg by mouth daily. metFORMIN (GLUCOPHAGE) 500 mg tablet Take 1 Tablet by mouth two (2) times daily (with meals). Qty: 180 Tablet, Refills: 3      ranolazine ER (Ranexa) 500 mg SR tablet Take 1 Tablet by mouth two (2) times a day. Qty: 180 Tablet, Refills: 3      eplerenone (INSPRA) 50 mg tab tablet Take 1 Tablet by mouth daily. Qty: 90 Tablet, Refills: 3    Associated Diagnoses: Chronic systolic heart failure (HCC)      isosorbide mononitrate ER (IMDUR) 30 mg tablet Take 1 Tablet by mouth every twelve (12) hours. Qty: 180 Tablet, Refills: 1      hydrALAZINE (APRESOLINE) 50 mg tablet TAKE 1 TABLET BY MOUTH THREE TIMES DAILY  Qty: 180 Tablet, Refills: 2      dapagliflozin (Farxiga) 10 mg tab tablet Take 1 Tablet by mouth daily. Qty: 90 Tablet, Refills: 1      diphenhydrAMINE (Benadryl Allergy) 25 mg tablet Take 25 mg by mouth every six (6) hours as needed.  Needed every night d/t picc line causing itching      glucose blood VI test strips (OneTouch Ultra Test) strip USE TO TEST BLOOD SUGAR DAILY  Qty: 100 Strip, Refills: 3    Associated Diagnoses: Type 2 diabetes, controlled, with peripheral circulatory disorder (HCC)      !! OneTouch Delica Plus Lancet 33 gauge misc USE TO TEST BLOOD SUGAR DAILY  Qty: 100 Lancet, Refills: 3      omeprazole (PRILOSEC) 20 mg capsule TAKE 1 CAPSULE BY MOUTH DAILY FOR INDIGESTION  Qty: 30 Capsule, Refills: 1    Associated Diagnoses: Nausea      rosuvastatin (CRESTOR) 10 mg tablet TAKE 1 TABLET NIGHTLY  Qty: 90 Tab, Refills: 3      tamsulosin (FLOMAX) 0.4 mg capsule TAKE 1 CAPSULE DAILY  Qty: 90 Cap, Refills: 3    Associated Diagnoses: Benign prostatic hyperplasia without lower urinary tract symptoms      !! lancets misc Use to test blood sugar daily  Qty: 100 Each, Refills: 11    Associated Diagnoses: Type 2 diabetes, controlled, with peripheral circulatory disorder (HCC)      acetaminophen (TYLENOL EXTRA STRENGTH) 500 mg tablet Take  by mouth every six (6) hours as needed. aspirin delayed-release 81 mg tablet Take 81 mg by mouth daily. !! - Potential duplicate medications found. Please discuss with provider.       STOP taking these medications       magnesium oxide (MAG-OX) 400 mg tablet Comments:   Reason for Stopping:         ergocalciferol (ERGOCALCIFEROL) 1,250 mcg (50,000 unit) capsule Comments:   Reason for Stopping:         benzonatate (TESSALON) 200 mg capsule Comments:   Reason for Stopping:               DISPOSITION:    Home With:   OT  PT  HH  RN       Long term SNF/Inpatient Rehab   x Independent/assisted living    Hospice    Other:       PATIENT CONDITION AT DISCHARGE:     Functional status    Poor     Deconditioned    x Independent      Cognition   x  Lucid     Forgetful     Dementia      Catheters/lines (plus indication)    Giles    x PICC     PEG      None      Code status   x  Full code     DNR      PHYSICAL EXAMINATION AT DISCHARGE:  Visit Vitals  /71 (BP 1 Location: Left arm, BP Patient Position: At rest)   Pulse 82   Temp 97 °F (36.1 °C)   Resp 19   Ht 6' 1\" (1.854 m)   Wt 82.5 kg (181 lb 14.1 oz)   SpO2 98%   BMI 24.00 kg/m²          CHRONIC MEDICAL DIAGNOSES:  Problem List as of 2/4/2022 Date Reviewed: 2/4/2022          Codes Class Noted - Resolved    CHF (congestive heart failure) ([facility] ICD-10-CM: I50.9  ICD-9-CM: 428.0  2/1/2022 - Present        NSTEMI (non-ST elevated myocardial infarction) ([facility] ICD-10-CM: I21.4  ICD-9-CM: 410.70 5/27/2021 - Present        Acute on chronic clinical systolic heart failure ([facility] ICD-10-CM: I50.23  ICD-9-CM: 428.23  5/20/2021 - Present        Active advance directive on file ICD-10-CM: Z78.9  ICD-9-CM: V49.89  3/7/2017 - Present        Type 2 diabetes, controlled, with peripheral circulatory disorder ([facility] ICD-10-CM: E11.51  ICD-9-CM: 250.70, 443.81  2/10/2016 - Present        CHF, stage C ([facility] ICD-10-CM: I50.9  ICD-9-CM: 428.0  8/6/2015 - Present        Mixed hyperlipidemia ICD-10-CM: E78.2  ICD-9-CM: 272.2  8/6/2015 - Present        Proteinuria ICD-10-CM: R80.9  ICD-9-CM: 791.0  6/19/2014 - Present        BPH without obstruction/lower urinary tract symptoms ICD-10-CM: N40.0  ICD-9-CM: 600.00  6/11/2014 - Present        Hematuria, gross ICD-10-CM: R31.0  ICD-9-CM: 599.71  2/19/2013 - Present        Cardiac arrest ([facility] ICD-10-CM: I46.9  ICD-9-CM: 427.5  2/4/2013 - Present        Insomnia, unspecified ICD-10-CM: G47.00  ICD-9-CM: 780.52  7/23/2012 - Present        Encounter for long-term (current) use of other medications ICD-10-CM: Z79.899  ICD-9-CM: V58.69  3/26/2012 - Present        Calculus of kidney ICD-10-CM: N20.0  ICD-9-CM: 592.0  3/22/2010 - Present        Coronary atherosclerosis of native coronary artery ICD-10-CM: I25.10  ICD-9-CM: 414.01  3/22/2010 - Present        Benign neoplasm of colon ICD-10-CM: D12.6  ICD-9-CM: 211.3  3/22/2010 - Present        Unspecified sleep apnea ICD-10-CM: G47.30  ICD-9-CM: 780.57  3/22/2010 - Present        Reflux esophagitis ICD-10-CM: K21.00  ICD-9-CM: 530.11  3/22/2010 - Present        RESOLVED: Severe protein-calorie malnutrition ([facility] ICD-10-CM: E43  ICD-9-CM: 262  5/21/2021 - 1/11/2022        RESOLVED: Type II or unspecified type diabetes mellitus without mention of complication, not stated as uncontrolled ICD-10-CM: E11.9  ICD-9-CM: 250.00  9/2/2014 - 2/10/2016        RESOLVED: Long term (current) use of anticoagulants ICD-10-CM: Z79.01  ICD-9-CM: V58.61 2/23/2011 - 12/5/2012        RESOLVED: Congestive heart failure, unspecified ICD-10-CM: I50.9  ICD-9-CM: 428.0  3/22/2010 - 8/6/2015        RESOLVED: Other and unspecified hyperlipidemia ICD-10-CM: E78.5  ICD-9-CM: 272.4  3/22/2010 - 8/6/2015        RESOLVED: DM w/ Complication [id number]: W61.2  ICD-9-CM: 250.90  3/22/2010 - 9/2/2014              Greater than  20  minutes were spent with the patient on counseling and coordination of care    Signed:   Polo Mansfield MD  2/4/2022  12:43 PM

## 2022-02-04 NOTE — PROGRESS NOTES
Hospital follow-up PCP transitional care appointment has been scheduled with Dr. Mirtha Davies for Monday, 2/14/22 at 11:30 a.m. Pending patient discharge.   Rebecca Alexis, Care Management Specialist.

## 2022-02-04 NOTE — PROGRESS NOTES
0380 pt had 9 beat run of vtach, pt is asymptomatic and sleeping, will continue to monitor. 9608 Pt BS was 68, pt is asymptomatic, given, 8oz juice and jai crackers with peanut butter. 0801 pts , will continue to monitor pt.     0730 Bedside and Verbal shift change report given to RN (oncoming nurse) by Laila Garcia RN (offgoing nurse). Report included the following information SBAR, Kardex, MAR and Alarm Parameters .

## 2022-02-04 NOTE — PROGRESS NOTES
0730 Bedside and Verbal shift change report given to KIRK Davis (oncoming nurse) by Cb Pearson (offgoing nurse). Report included the following information SBAR, Kardex, Intake/Output, MAR and Recent Results. 1930 Bedside and Verbal shift change report given to [facility] (oncoming nurse) by Anatoliy Alberts (offgoing nurse). Report included the following information SBAR, Kardex, Intake/Output, MAR and Recent Results.

## 2022-02-04 NOTE — PROGRESS NOTES
TRANSFER - OUT REPORT:    Verbal report given to Joie PELAEZ  on Vladveronica Dooley Sr  being transferred to [facility] for continued care. of patients Situation, Background, Assessment and   Recommendations(SBAR).      I

## 2022-02-05 VITALS
BODY MASS INDEX: 23.64 KG/M2 | RESPIRATION RATE: 19 BRPM | HEART RATE: 84 BPM | HEIGHT: 73 IN | WEIGHT: 178.35 LBS | SYSTOLIC BLOOD PRESSURE: 111 MMHG | TEMPERATURE: 97 F | OXYGEN SATURATION: 100 % | DIASTOLIC BLOOD PRESSURE: 60 MMHG

## 2022-02-05 LAB
ALBUMIN SERPL-MCNC: 3.3 G/DL (ref 3.5–5)
ALBUMIN/GLOB SERPL: 0.9 {RATIO} (ref 1.1–2.2)
ALP SERPL-CCNC: 168 U/L (ref 45–117)
ALT SERPL-CCNC: 19 U/L (ref 12–78)
ANION GAP SERPL CALC-SCNC: 8 MMOL/L (ref 5–15)
AST SERPL-CCNC: 75 U/L (ref 15–37)
BASOPHILS # BLD: 0 K/UL (ref 0–0.1)
BASOPHILS NFR BLD: 1 % (ref 0–1)
BILIRUB SERPL-MCNC: 1.3 MG/DL (ref 0.2–1)
BNP SERPL-MCNC: 2858 PG/ML
BUN SERPL-MCNC: 20 MG/DL (ref 6–20)
BUN/CREAT SERPL: 15 (ref 12–20)
CALCIUM SERPL-MCNC: 9.4 MG/DL (ref 8.5–10.1)
CHLORIDE SERPL-SCNC: 102 MMOL/L (ref 97–108)
CO2 SERPL-SCNC: 26 MMOL/L (ref 21–32)
CREAT SERPL-MCNC: 1.3 MG/DL (ref 0.7–1.3)
DIFFERENTIAL METHOD BLD: ABNORMAL
EOSINOPHIL # BLD: 0.1 K/UL (ref 0–0.4)
EOSINOPHIL NFR BLD: 2 % (ref 0–7)
ERYTHROCYTE [DISTWIDTH] IN BLOOD BY AUTOMATED COUNT: 18.5 % (ref 11.5–14.5)
GLOBULIN SER CALC-MCNC: 3.6 G/DL (ref 2–4)
GLUCOSE BLD STRIP.AUTO-MCNC: 107 MG/DL (ref 65–117)
GLUCOSE BLD STRIP.AUTO-MCNC: 150 MG/DL (ref 65–117)
GLUCOSE BLD STRIP.AUTO-MCNC: 56 MG/DL (ref 65–117)
GLUCOSE BLD STRIP.AUTO-MCNC: 95 MG/DL (ref 65–117)
GLUCOSE SERPL-MCNC: 46 MG/DL (ref 65–100)
HCT VFR BLD AUTO: 32.4 % (ref 36.6–50.3)
HEMOCCULT STL QL: NEGATIVE
HGB BLD-MCNC: 10.2 G/DL (ref 12.1–17)
IMM GRANULOCYTES # BLD AUTO: 0 K/UL (ref 0–0.04)
IMM GRANULOCYTES NFR BLD AUTO: 0 % (ref 0–0.5)
LYMPHOCYTES # BLD: 0.6 K/UL (ref 0.8–3.5)
LYMPHOCYTES NFR BLD: 19 % (ref 12–49)
MAGNESIUM SERPL-MCNC: 1.9 MG/DL (ref 1.6–2.4)
MCH RBC QN AUTO: 22.8 PG (ref 26–34)
MCHC RBC AUTO-ENTMCNC: 31.5 G/DL (ref 30–36.5)
MCV RBC AUTO: 72.3 FL (ref 80–99)
MONOCYTES # BLD: 0.5 K/UL (ref 0–1)
MONOCYTES NFR BLD: 14 % (ref 5–13)
NEUTS SEG # BLD: 2.2 K/UL (ref 1.8–8)
NEUTS SEG NFR BLD: 64 % (ref 32–75)
NRBC # BLD: 0.04 K/UL (ref 0–0.01)
NRBC BLD-RTO: 1.2 PER 100 WBC
PLATELET # BLD AUTO: 135 K/UL (ref 150–400)
PLATELET COMMENTS,PCOM: ABNORMAL
PMV BLD AUTO: ABNORMAL FL (ref 8.9–12.9)
POTASSIUM SERPL-SCNC: 3.6 MMOL/L (ref 3.5–5.1)
PROT SERPL-MCNC: 6.9 G/DL (ref 6.4–8.2)
RBC # BLD AUTO: 4.48 M/UL (ref 4.1–5.7)
RBC MORPH BLD: ABNORMAL
SERVICE CMNT-IMP: ABNORMAL
SERVICE CMNT-IMP: ABNORMAL
SERVICE CMNT-IMP: NORMAL
SERVICE CMNT-IMP: NORMAL
SODIUM SERPL-SCNC: 136 MMOL/L (ref 136–145)
WBC # BLD AUTO: 3.4 K/UL (ref 4.1–11.1)

## 2022-02-05 PROCEDURE — 74011000250 HC RX REV CODE- 250: Performed by: STUDENT IN AN ORGANIZED HEALTH CARE EDUCATION/TRAINING PROGRAM

## 2022-02-05 PROCEDURE — 74011636637 HC RX REV CODE- 636/637: Performed by: HOSPITALIST

## 2022-02-05 PROCEDURE — 74011250637 HC RX REV CODE- 250/637: Performed by: NURSE PRACTITIONER

## 2022-02-05 PROCEDURE — 74011000250 HC RX REV CODE- 250: Performed by: HOSPITALIST

## 2022-02-05 PROCEDURE — 80053 COMPREHEN METABOLIC PANEL: CPT

## 2022-02-05 PROCEDURE — 82962 GLUCOSE BLOOD TEST: CPT

## 2022-02-05 PROCEDURE — 85025 COMPLETE CBC W/AUTO DIFF WBC: CPT

## 2022-02-05 PROCEDURE — 74011250636 HC RX REV CODE- 250/636: Performed by: HOSPITALIST

## 2022-02-05 PROCEDURE — P9047 ALBUMIN (HUMAN), 25%, 50ML: HCPCS | Performed by: NURSE PRACTITIONER

## 2022-02-05 PROCEDURE — 74011250636 HC RX REV CODE- 250/636: Performed by: NURSE PRACTITIONER

## 2022-02-05 PROCEDURE — 36415 COLL VENOUS BLD VENIPUNCTURE: CPT

## 2022-02-05 PROCEDURE — 83735 ASSAY OF MAGNESIUM: CPT

## 2022-02-05 PROCEDURE — 83880 ASSAY OF NATRIURETIC PEPTIDE: CPT

## 2022-02-05 PROCEDURE — 74011250637 HC RX REV CODE- 250/637: Performed by: STUDENT IN AN ORGANIZED HEALTH CARE EDUCATION/TRAINING PROGRAM

## 2022-02-05 RX ADMIN — SODIUM CHLORIDE, PRESERVATIVE FREE 10 ML: 5 INJECTION INTRAVENOUS at 05:55

## 2022-02-05 RX ADMIN — EPLERENONE 50 MG: 25 TABLET, FILM COATED ORAL at 08:02

## 2022-02-05 RX ADMIN — TAMSULOSIN HYDROCHLORIDE 0.4 MG: 0.4 CAPSULE ORAL at 08:02

## 2022-02-05 RX ADMIN — POLYETHYLENE GLYCOL 3350 17 G: 17 POWDER, FOR SOLUTION ORAL at 08:15

## 2022-02-05 RX ADMIN — ALBUMIN (HUMAN) 12.5 G: 0.25 INJECTION, SOLUTION INTRAVENOUS at 08:01

## 2022-02-05 RX ADMIN — SODIUM CHLORIDE, PRESERVATIVE FREE 10 ML: 5 INJECTION INTRAVENOUS at 06:51

## 2022-02-05 RX ADMIN — POTASSIUM CHLORIDE 40 MEQ: 750 TABLET, FILM COATED, EXTENDED RELEASE ORAL at 08:02

## 2022-02-05 RX ADMIN — PRASUGREL 10 MG: 10 TABLET, FILM COATED ORAL at 08:02

## 2022-02-05 RX ADMIN — MILRINONE LACTATE IN DEXTROSE 0.38 MCG/KG/MIN: 200 INJECTION, SOLUTION INTRAVENOUS at 11:33

## 2022-02-05 RX ADMIN — RANOLAZINE 500 MG: 500 TABLET, FILM COATED, EXTENDED RELEASE ORAL at 08:15

## 2022-02-05 RX ADMIN — ASPIRIN 81 MG: 81 TABLET, COATED ORAL at 08:02

## 2022-02-05 RX ADMIN — HEPARIN SODIUM 5000 UNITS: 5000 INJECTION INTRAVENOUS; SUBCUTANEOUS at 15:04

## 2022-02-05 RX ADMIN — INSULIN GLARGINE 18 UNITS: 100 INJECTION, SOLUTION SUBCUTANEOUS at 08:02

## 2022-02-05 RX ADMIN — ISOSORBIDE MONONITRATE 30 MG: 30 TABLET, EXTENDED RELEASE ORAL at 08:02

## 2022-02-05 RX ADMIN — HEPARIN SODIUM 5000 UNITS: 5000 INJECTION INTRAVENOUS; SUBCUTANEOUS at 05:54

## 2022-02-05 RX ADMIN — PANTOPRAZOLE SODIUM 40 MG: 40 TABLET, DELAYED RELEASE ORAL at 06:51

## 2022-02-05 RX ADMIN — MILRINONE LACTATE IN DEXTROSE 0.38 MCG/KG/MIN: 200 INJECTION, SOLUTION INTRAVENOUS at 00:33

## 2022-02-05 RX ADMIN — BUMETANIDE 2 MG: 1 TABLET ORAL at 08:02

## 2022-02-05 RX ADMIN — SODIUM CHLORIDE, PRESERVATIVE FREE 10 ML: 5 INJECTION INTRAVENOUS at 15:06

## 2022-02-05 RX ADMIN — HYDRALAZINE HYDROCHLORIDE 50 MG: 50 TABLET, FILM COATED ORAL at 08:02

## 2022-02-05 RX ADMIN — DEXTROSE MONOHYDRATE 25 G: 25 INJECTION, SOLUTION INTRAVENOUS at 05:20

## 2022-02-05 NOTE — PROGRESS NOTES
ADVANCED HEART FAILURE NOTE    Patient seen and examined. Feels much better. Hemodynamics stable. Patient declined LVAD workup/implant. Discussed we will continue inotropic support as palliation    Recommendation:  1. Okay for discharge from HF perspective  2. Home dose of bumex 2mg twice daily  3. Keep current weight on home scale unchanged 178lbs (on our scale)  4.  Follow-up with [facility] within 7 days from discharge    Kathy Zaragoza MD  [facility]

## 2022-02-05 NOTE — PROGRESS NOTES
0730 Bedside and Verbal shift change report given to [id number] Víctor Del Sol (oncoming nurse) by Malissa Tran (offgoing nurse). Report included the following information SBAR, Kardex, Intake/Output, MAR, Recent Results and Med Rec Status. 1600 I have reviewed discharge instructions with the patient and spouse. The patient and spouse verbalized understanding. Discharge medications reviewed with patient and spouse and appropriate educational materials and side effects teaching were provided. Additional pt education has been provided regarding HF and home milirone gtt. Tele and LDAs removed.

## 2022-02-05 NOTE — PROGRESS NOTES
Problem: Diabetes Self-Management  Goal: *Disease process and treatment process  Description: Define diabetes and identify own type of diabetes; list 3 options for treating diabetes. Outcome: Resolved/Met  Goal: *Incorporating nutritional management into lifestyle  Description: Describe effect of type, amount and timing of food on blood glucose; list 3 methods for planning meals. Outcome: Resolved/Met  Goal: *Incorporating physical activity into lifestyle  Description: State effect of exercise on blood glucose levels. Outcome: Resolved/Met  Goal: *Developing strategies to promote health/change behavior  Description: Define the ABC's of diabetes; identify appropriate screenings, schedule and personal plan for screenings. Outcome: Resolved/Met  Goal: *Using medications safely  Description: State effect of diabetes medications on diabetes; name diabetes medication taking, action and side effects. Outcome: Resolved/Met  Goal: *Monitoring blood glucose, interpreting and using results  Description: Identify recommended blood glucose targets  and personal targets. Outcome: Resolved/Met  Goal: *Prevention, detection, treatment of acute complications  Description: List symptoms of hyper- and hypoglycemia; describe how to treat low blood sugar and actions for lowering  high blood glucose level. Outcome: Resolved/Met  Goal: *Prevention, detection and treatment of chronic complications  Description: Define the natural course of diabetes and describe the relationship of blood glucose levels to long term complications of diabetes.   Outcome: Resolved/Met  Goal: *Developing strategies to address psychosocial issues  Description: Describe feelings about living with diabetes; identify support needed and support network  Outcome: Resolved/Met  Goal: *Insulin pump training  Outcome: Resolved/Met  Goal: *Sick day guidelines  Outcome: Resolved/Met  Goal: *Patient Specific Goal (EDIT GOAL, INSERT TEXT)  Outcome: Resolved/Met Problem: Patient Education: Go to Patient Education Activity  Goal: Patient/Family Education  Outcome: Resolved/Met     Problem: Falls - Risk of  Goal: *Absence of Falls  Description: Document Mariana Blanco Fall Risk and appropriate interventions in the flowsheet.   Outcome: Resolved/Met     Problem: Patient Education: Go to Patient Education Activity  Goal: Patient/Family Education  Outcome: Resolved/Met

## 2022-02-05 NOTE — PROGRESS NOTES
1930: Bedside shift change report given to Lucila Bosch RN (oncoming nurse) by Jonny French RN (offgoing nurse). Report included the following information SBAR, Kardex, Intake/Output, MAR, and Recent Results. 0730: Bedside shift change report given to Jonny French RN (oncoming nurse) by Lucila Bosch RN (offgoing nurse). Report included the following information SBAR, Kardex, Intake/Output, MAR, and Recent Results. Problem: Falls - Risk of  Goal: *Absence of Falls  Description: Document Yves Engels Fall Risk and appropriate interventions in the flowsheet.   Outcome: Progressing Towards Goal  Note: Fall Risk Interventions:    Medication Interventions: Patient to call before getting OOB

## 2022-02-05 NOTE — PROGRESS NOTES
Transitions of Care:    RUR- 16%    Disposition: Home with continued inotrope - may need updated written orders from [facility] MD/NP for inotrope with dosing weight prior to discharge for inotrope per [place] with [facility] - new orders to be sent/coordinated with [facility] for new delivery of medications/CAD pump re-programmed prior to discharge    Follow-up: [facility] clinic    Transportation: wife/family      Home health providers:     [facility] for skilled nursing  [facility] for inotrope medication delivery/supplies       staff placed to call to [facility] and [facility] - current orders sent to both agencies via [employer] - informed patient is not going to be discharged until likely tomorrow. CM made Leigh Ann Zee with [facility] aware. Nursing staff will need to contact CM staff early (pager [phone])  on day of discharge to give adequate time for mixing/delivery of inotrope medication.

## 2022-02-05 NOTE — PROGRESS NOTES
5325: BS: 46 via blood draw. Rechecked via glucometer. BS: 56. Pt asymptomatic. D50 administered per protocol. BS now 150.

## 2022-02-05 NOTE — PROGRESS NOTES
MARY: Discharge home with [facility] and inotrope infusion with [facility]. Transportation in car with family. RUR: 16%    Disposition: Patient discharging home with [facility] and continuance of inotrope. CM confirmed orders received by [facility]. No barriers to discharge. AVS updated. Medicare pt has received, reviewed, and signed 2nd IM letter informing them of their right to appeal the discharge. Signed copied has been placed on pt bedside chart.     Martin De Leon, [facility] [facility],[address]  [phone]

## 2022-02-06 NOTE — DISCHARGE SUMMARY
Discharge Summary       PATIENT ID: Charito Lyons  MRN: [id number]   YOB: 1945    DATE OF ADMISSION: 2/1/2022  5:04 PM    DATE OF DISCHARGE: 2/5/2022    PRIMARY CARE PROVIDER: Lili Huntley MD     ATTENDING PHYSICIAN: Ann Charlton MD   DISCHARGING PROVIDER: Ann Charlton MD    To contact this individual call [phone] and ask the  to page. If unavailable ask to be transferred the Adult Hospitalist Department. CONSULTATIONS: IP CONSULT TO ADVANCED HEART FAILURE    PROCEDURES/SURGERIES: * No surgery found *    DISCHARGE DIAGNOSES:     NYHA SYST HEART FAILURE CLASS IV;  NOT A CANDIDA for either bb or ace/aci/adlo antagonist 2n2 ckd/hypotension on chronic infusion of pressors       Note:  based on current admission blood sugar ( glycemic control) and serial S Cr assessments; resumption of home diabetic regimen is reasonable;     Results for Cira Wilson (MRN [id number]) as of 2/4/2022 12:35   11/12/2021 00:00 1/11/2022 11:34 2/1/2022 16:58 2/1/2022 19:35 2/2/2022 04:40   Glucose 62 (L)  141 (H)  213 (H)   Creatinine 1.03  1.38 (H)  1.18       Based on last AHF note: The plan follows:     RECOMMENDATIONS/PLAN:  Continue current medical therapy for heart failure  Continue milrinone 0.375 mcg/kg/min - dose based on 84.5 kg. Please do standing weight daily   Intolerant Coreg due to RV dysfunction   ACEi/ARB/ARNi previously discontinued in anticipation of possible cardiac surgery  Continue current dose of eplerenone 50 mg daily  Order Potassium 20 mEq po once in addition to daily dose: K level 3.9  Continue Potassium 40 mEq po BID  Continue Farxiga 10 mg daily- not on formuary. May need to bring medication from home if not DC today  Continue current dose of Hydralazine 50 TID and Imdur 30 mg twice daily  Continue Bumex 2 mg po TID. Home dose bumex 1mg daily; occasionally misses doses.  Goal weight < 190  Ordered UA, Stool blood for today  Low Iron, given Venofer 200 mg daily X 2 doses  Low albumin @2.9, Continue daily albumin infusion  Continue ASA 81 mg daily   Continue Effient 10 mg po daily  Continue current dose of rosuvastatin 10 mg daily  Continue ranolazine 500 mg BID (for HR and angina control)  Ordered CPAP therapy - uses most of the time at home  Consulted Case Management for discharge planning. Also placed [facility]  Encouraged to walk the hallways with nursing and sit in the chair during the day. PT ordered for eval & treat  Patient with pain in left leg, Duplex showed No evidence of right or left lower extremity vein thrombosis. Advanced care plan forms completed   Diabetes with A1c 6.8%  on 1/11/22; consulted Diabetes management; appreciate recs[ but resuming home regiment as a1c , </= to 7.0 Dajuan Borja MD 2/6/2022 ]  Vit D level normal, patient does not need to continue Vitamin D supplements at home   Genetic test negative   Ordered AICD interrogation   Reinforced heart healthy, low salt diet  Reinforced appropriate fluid intake of 6 x 8oz glasses of water per day  Monitor and record daily standing weights and strict I/O's  Follow-up with primary cardiologist Dr. Yanni Cali   Follow[facility] with [facility] on 2/8 at 9 am virtual apt      Little to add to above     But of record; Patient presented with worsening dyspnea and edema. His history significant for severe ischemic cardiomyopathy with LVEF of 20-25%, stage D NYHA class IV who is on chronic palliative milrinone infusion. He is followed by advanced heart failure team discussion underway for LVAD placement.     Other significant PMH include type 2 diabetes, BPH, adenocarcinoma of the lung, completed radiation therapy.     Per RT:     Antolin Grove RT   Respiratory Therapist   Respiratory Therapy   Progress Notes      Addendum   Date of Service:  02/04/22 0917                       []Hide copied text    []Yordy for details    6-Minute Walk: Pt on room air at rest SpO2-99%, HR-77. Pt. SpO2 on room air with ambulation SpO2-93%, HR-105. Post ambulation SpO2-99%, HR-82. No dyspnea noted and pt reports he feels fine. No supplemental O2 required. Total distance of ambulation: 450ft. Severe ischemic cardiomyopathy, CAD, history of MI   Acute on chronic systolic heart failure, NYHA class IV: POA. AICD in place. Patient being evaluated for LVAD  -Diuresis: Bumex 2 mg IV twice daily.  -Continue chronic palliative milrinone infusion.  -Continue current GDMT therapy.  -Daily weights  -Monitor and replete electrolytes  -Advanced heart failure team follow.  - good night for pt last pm      Noninsulin-dependent type 2 diabetes. Takes Metformin and Farxiga at home. A1c 6.8 on 1/11/2027  -SSI plus hypoglycemic protocols           Lab Results            DISCHARGE DIAGNOSES / PLAN:      As above     BMI: Body mass index is 23.53 kg/m². . This patient: Has a BMI within normal limits. PENDING TEST RESULTS:   At the time of discharge the following test results are still pending: na     ADDITIONAL CARE RECOMMENDATIONS:   Na     NOTIFY YOUR PHYSICIAN FOR ANY OF THE FOLLOWING:   Fever over 101 degrees for 24 hours. Chest pain, shortness of breath, fever, chills, nausea, vomiting, diarrhea, change in mentation, falling, weakness, bleeding. Severe pain or pain not relieved by medications, as well as any other signs or symptoms that you may have questions about. FOLLOW UP APPOINTMENTS:    Follow-up Information     Follow up With Specialties Details Why [address] Cardiology  [address] 2/8/2022 at p am virtual [address]  [address] [facility]    López Patel MD Internal Medicine On 2/14/2022 Hospital follow up PCP appointment Monday, 2/14/22 at 11:30 a.m.  [address]  [address] Call As needed [address] [facility] [address] [facility]  Call As needed [address] [facility] [address], [place], [address]  [phone]             DIET: Diabetic Diet low sodium as instructed in hospital     ACTIVITY: Activity as tolerated    EQUIPMENT needed: na     DISCHARGE MEDICATIONS:  Discharge Medication List as of 2/5/2022  2:19 PM      START taking these medications    Details   potassium chloride SR (K-TAB) 20 mEq tablet Take 2 Tablets by mouth two (2) times a day., No Print, Disp-120 Tablet, R-2         CONTINUE these medications which have CHANGED    Details   bumetanide (BUMEX) 2 mg tablet Take 1 Tablet by mouth three (3) times daily. , No Print, Disp-90 Tablet, R-1      milrinone (PRIMACOR) 20 mg/100 mL (200 mcg/mL) infusion 31.688 mcg/min by IntraVENous route continuous. , No Print, Disp-500 mL, R-1         CONTINUE these medications which have NOT CHANGED    Details   prasugreL (Effient) 10 mg tablet Take 10 mg by mouth daily. , Historical Med      metFORMIN (GLUCOPHAGE) 500 mg tablet Take 1 Tablet by mouth two (2) times daily (with meals). , Normal, Disp-180 Tablet, R-3      ranolazine ER (Ranexa) 500 mg SR tablet Take 1 Tablet by mouth two (2) times a day., Mail Order, Disp-180 Tablet, R-3      eplerenone (INSPRA) 50 mg tab tablet Take 1 Tablet by mouth daily. , Mail Order, Disp-90 Tablet, R-3      isosorbide mononitrate ER (IMDUR) 30 mg tablet Take 1 Tablet by mouth every twelve (12) hours. , Normal, Disp-180 Tablet, R-1      hydrALAZINE (APRESOLINE) 50 mg tablet TAKE 1 TABLET BY MOUTH THREE TIMES DAILY, Normal, Disp-180 Tablet, R-2      dapagliflozin (Farxiga) 10 mg tab tablet Take 1 Tablet by mouth daily. , Normal, Disp-90 Tablet, R-1      diphenhydrAMINE (Benadryl Allergy) 25 mg tablet Take 25 mg by mouth every six (6) hours as needed.  Needed every night d/t picc line causing itching, Historical Med      glucose blood VI test strips (OneTouch Ultra Test) strip USE TO TEST BLOOD SUGAR DAILY, Normal, Disp-100 Strip, R-3      !! OneTouch Delica Plus Lancet 33 gauge misc USE TO TEST BLOOD SUGAR DAILY, Normal, Disp-100 Lancet, R-3      omeprazole (PRILOSEC) 20 mg capsule TAKE 1 CAPSULE BY MOUTH DAILY FOR INDIGESTION, Normal, Disp-30 Capsule, R-1      rosuvastatin (CRESTOR) 10 mg tablet TAKE 1 TABLET NIGHTLY, Normal, Disp-90 Tab, R-3      tamsulosin (FLOMAX) 0.4 mg capsule TAKE 1 CAPSULE DAILY, Normal, Disp-90 Cap, R-3      !! lancets misc Use to test blood sugar daily, Normal, Disp-100 Each, R-11      acetaminophen (TYLENOL EXTRA STRENGTH) 500 mg tablet Take  by mouth every six (6) hours as needed., Historical Med      aspirin delayed-release 81 mg tablet Take 81 mg by mouth daily. , Historical Med       !! - Potential duplicate medications found. Please discuss with provider.       STOP taking these medications       magnesium oxide (MAG-OX) 400 mg tablet Comments:   Reason for Stopping:         ergocalciferol (ERGOCALCIFEROL) 1,250 mcg (50,000 unit) capsule Comments:   Reason for Stopping:         benzonatate (TESSALON) 200 mg capsule Comments:   Reason for Stopping:               DISPOSITION:    Home With:   OT  PT  HH  RN       Long term SNF/Inpatient Rehab   x Independent/assisted living    Hospice    Other:       PATIENT CONDITION AT DISCHARGE:     Functional status    Poor     Deconditioned    x Independent      Cognition   x  Lucid     Forgetful     Dementia      Catheters/lines (plus indication)    Giles    x PICC     PEG      None      Code status   x  Full code     DNR      PHYSICAL EXAMINATION AT DISCHARGE:  Visit Vitals  /60 (BP 1 Location: Left arm, BP Patient Position: At rest)   Pulse 84   Temp 97 °F (36.1 °C)   Resp 19   Ht 6' 1\" (1.854 m)   Wt 80.9 kg (178 lb 5.6 oz)   SpO2 100%   BMI 23.53 kg/m²          CHRONIC MEDICAL DIAGNOSES:  Problem List as of 2/5/2022 Date Reviewed: 2/4/2022          Codes Class Noted - Resolved    NSTEMI (non-ST elevated myocardial infarction) ([facility] ICD-10-CM: I21.4  ICD-9-CM: 410.70  5/27/2021 - Present        Acute on chronic clinical systolic heart failure ([facility].) ICD-10-CM: I50.23  ICD-9-CM: 428.23  5/20/2021 - Present        Active advance directive on file ICD-10-CM: Z78.9  ICD-9-CM: V49.89  3/7/2017 - Present        Type 2 diabetes, controlled, with peripheral circulatory disorder ([facility].) ICD-10-CM: E11.51  ICD-9-CM: 250.70, 443.81  2/10/2016 - Present        CHF, stage C ([facility].) ICD-10-CM: I50.9  ICD-9-CM: 428.0  8/6/2015 - Present        Mixed hyperlipidemia ICD-10-CM: E78.2  ICD-9-CM: 272.2  8/6/2015 - Present        Proteinuria ICD-10-CM: R80.9  ICD-9-CM: 791.0  6/19/2014 - Present        BPH without obstruction/lower urinary tract symptoms ICD-10-CM: N40.0  ICD-9-CM: 600.00  6/11/2014 - Present        Hematuria, gross ICD-10-CM: R31.0  ICD-9-CM: 599.71  2/19/2013 - Present        Cardiac arrest ([facility].) ICD-10-CM: I46.9  ICD-9-CM: 427.5  2/4/2013 - Present        Insomnia, unspecified ICD-10-CM: G47.00  ICD-9-CM: 780.52  7/23/2012 - Present        Encounter for long-term (current) use of other medications ICD-10-CM: Z79.899  ICD-9-CM: V58.69  3/26/2012 - Present        Calculus of kidney ICD-10-CM: N20.0  ICD-9-CM: 592.0  3/22/2010 - Present        Coronary atherosclerosis of native coronary artery ICD-10-CM: I25.10  ICD-9-CM: 414.01  3/22/2010 - Present        Benign neoplasm of colon ICD-10-CM: D12.6  ICD-9-CM: 211.3  3/22/2010 - Present        Unspecified sleep apnea ICD-10-CM: G47.30  ICD-9-CM: 780.57  3/22/2010 - Present        Reflux esophagitis ICD-10-CM: K21.00  ICD-9-CM: 530.11  3/22/2010 - Present        RESOLVED: Severe protein-calorie malnutrition ([facility] ICD-10-CM: E43  ICD-9-CM: 262  5/21/2021 - 1/11/2022        RESOLVED: Type II or unspecified type diabetes mellitus without mention of complication, not stated as uncontrolled ICD-10-CM: E11.9  ICD-9-CM: 250.00  9/2/2014 - 2/10/2016        RESOLVED: Long term (current) use of anticoagulants ICD-10-CM: Z79.01  ICD-9-CM: V58.61  2/23/2011 - 12/5/2012        RESOLVED: Congestive heart failure, unspecified ICD-10-CM: I50.9  ICD-9-CM: 428.0  3/22/2010 - 8/6/2015        RESOLVED: Other and unspecified hyperlipidemia ICD-10-CM: E78.5  ICD-9-CM: 272.4  3/22/2010 - 8/6/2015        RESOLVED: DM w/ Complication [id number]-[id number]: Q25.2  ICD-9-CM: 250.90  3/22/2010 - 9/2/2014        CHF (congestive heart failure) ([facility] ICD-10-CM: I50.9  ICD-9-CM: 428.0  2/1/2022 - Present              Greater than  20  minutes were spent with the patient on counseling and coordination of care    Signed:   Pratibha Wagner MD  2/6/2022  12:43 PM

## 2022-02-07 ENCOUNTER — PATIENT OUTREACH (OUTPATIENT)
Dept: CASE MANAGEMENT | Age: 77
End: 2022-02-07

## 2022-02-07 NOTE — PROGRESS NOTES
Care Transitions Outreach Attempt    Call within 2 business days of discharge: Yes   Attempted to reach patient for transitions of care follow up. Unable to reach patient. Patient: Rosa Monique Patient : 1945 MRN: [id number]    Last Discharge [address]       Complaint Diagnosis Description Type Department Provider    22 Shortness of Breath Acute on chronic congestive heart failure, unspecified heart failure type ([facility] . .. ED to Hosp-Admission (Discharged) (ADMIT) Gordon Paiz MD; BECKI Bazzi. Was this an external facility discharge?  No Discharge Facility: na      Noted following upcoming appointments from discharge chart review:   [facility][1] follow up appointment(s):   Future Appointments   Date Time Provider Arya Doherty   2022  9:00 AM Bernadette MONTERO NP [facility] BS AMB   2022 11:30 AM Maribell Gustafson MD [facility]   3/8/2022 11:30 AM Vivek Wong NP [facility] BS AMB   2022 10:30 AM [facility]     Non-[facility] follow up appointment(s): vipul

## 2022-02-08 ENCOUNTER — PATIENT OUTREACH (OUTPATIENT)
Dept: CASE MANAGEMENT | Age: 77
End: 2022-02-08

## 2022-02-08 NOTE — PROGRESS NOTES
Care Transitions Initial Call    Call within 2 business days of discharge: Yes     Patient: Herminia Valles Sr Patient : 1945 MRN: [id number]    Last Discharge [address]       Complaint Diagnosis Description Type Department Provider    22 Shortness of Breath Acute on chronic congestive heart failure, unspecified heart failure type ([facility] . .. ED to Hosp-Admission (Discharged) (ADMIT) Clarita Tadeo MD; BECKI Montes. Was this an external facility discharge? No Discharge Facility: na    Challenges to be reviewed by the provider   Additional needs identified to be addressed with provider: no  none         Method of communication with provider : none        Advance Care Planning:   Does patient have an Advance Directive:  yes; reviewed and current     Inpatient Readmission Risk score: Unplanned Readmit Risk Score: 16.6 ( )    Was this a readmission? no   Patient stated reason for the admission: did not discuss on this call    Patients top risk factors for readmission: polypharmacy and CHF   Interventions to address risk factors: Obtained and reviewed discharge summary and/or continuity of care documents    Care Transition Nurse (CTN) contacted the patient by telephone to perform post hospital discharge assessment. Verified name and  with patient as identifiers. Provided introduction to self, and explanation of the CTN role. CTN reviewed discharge instructions, medical action plan and red flags with patient who verbalized understanding. Were discharge instructions available to patient? yes. Reviewed appropriate site of care based on symptoms and resources available to patient including: PCP, Specialist and When to call 911. Patient given an opportunity to ask questions and does not have any further questions or concerns at this time. The patient agrees to contact the PCP office for questions related to their healthcare.      Medication reconciliation was performed with patient, who verbalizes understanding of administration of home medications. Advised obtaining a 90-day supply of all daily and as-needed medications. Referral to Pharm D needed: no     Home Health/Outpatient orders at discharge: home health care  [address]: [facility]  Date of initial visit: unk    [facility] ordered at discharge: franki drip  [address] [facility] [address]: [place] [address] Equipment received:         Discussed follow-up appointments. If no appointment was previously scheduled, appointment scheduling offered: no. Is follow up appointment scheduled within 7 days of discharge? yes. [facility] follow up appointment(s):   Future Appointments   Date Time Provider Arya Doherty   2/11/2022  2:00 PM Neil Palacios NP [facility] AMB   2/14/2022 11:30 AM Dasia Gustafson MD [facility]  AMB   3/8/2022 11:30 AM Queta Wong NP [facility]   7/12/2022 10:30 AM Dasia Gustafson MD [facility]  AMB     Non-[facility] follow up appointment(s): unk    Plan for follow-up call in 5-7 days based on severity of symptoms and risk factors. Plan for next call: self management-will review low Na diet with pt and pt's wife and review daily wts and follow up appointment-will review follow up appts with pt and pt's wife  CTN provided contact information for future needs. Goals Addressed                 This Visit's Progress     Attends follow-up appointments as directed. 2/8/2022  Reviewed upcoming appts with pt and wife and they confirmed follow up appts. AFP       Knowledge and adherence of prescribed medication (ie. action, side effects, missed dose, etc.).        2/8/2022  performed medication reconciliation with pt and pt's wife and verified medications. AFP       Understands red flags post discharge. 2/8/2022  Reviewed red flags with pt and pt's wife. Will review low Na diet and importance of daily wts on next call.   Pt reports that he follows a low Na diet and does weigh daily and reports that weight is trending down.   AFP

## 2022-02-10 ENCOUNTER — TELEPHONE (OUTPATIENT)
Dept: CARDIOLOGY CLINIC | Age: 77
End: 2022-02-10

## 2022-02-10 NOTE — TELEPHONE ENCOUNTER
Telephone Call RE:  Appointment reminder     Outcome:     [] Patient confirmed appointment   [] Patient rescheduled appointment for    [] Unable to reach   [x] Left message              [] Other:       Radene Eliud

## 2022-02-11 ENCOUNTER — OFFICE VISIT (OUTPATIENT)
Dept: CARDIOLOGY CLINIC | Age: 77
End: 2022-02-11
Payer: MEDICARE

## 2022-02-11 ENCOUNTER — TELEPHONE (OUTPATIENT)
Dept: CARDIOLOGY CLINIC | Age: 77
End: 2022-02-11

## 2022-02-11 VITALS
RESPIRATION RATE: 16 BRPM | BODY MASS INDEX: 23.03 KG/M2 | SYSTOLIC BLOOD PRESSURE: 110 MMHG | WEIGHT: 173.8 LBS | TEMPERATURE: 97.5 F | DIASTOLIC BLOOD PRESSURE: 72 MMHG | OXYGEN SATURATION: 98 % | HEART RATE: 81 BPM | HEIGHT: 73 IN

## 2022-02-11 DIAGNOSIS — Z79.899 RECEIVING INOTROPIC MEDICATION: ICD-10-CM

## 2022-02-11 DIAGNOSIS — Z09 HOSPITAL DISCHARGE FOLLOW-UP: ICD-10-CM

## 2022-02-11 DIAGNOSIS — I50.22 CHRONIC SYSTOLIC HEART FAILURE (HCC): Primary | ICD-10-CM

## 2022-02-11 PROCEDURE — G8432 DEP SCR NOT DOC, RNG: HCPCS | Performed by: NURSE PRACTITIONER

## 2022-02-11 PROCEDURE — G8427 DOCREV CUR MEDS BY ELIG CLIN: HCPCS | Performed by: NURSE PRACTITIONER

## 2022-02-11 PROCEDURE — 1111F DSCHRG MED/CURRENT MED MERGE: CPT | Performed by: NURSE PRACTITIONER

## 2022-02-11 PROCEDURE — 99214 OFFICE O/P EST MOD 30 MIN: CPT | Performed by: NURSE PRACTITIONER

## 2022-02-11 PROCEDURE — G8420 CALC BMI NORM PARAMETERS: HCPCS | Performed by: NURSE PRACTITIONER

## 2022-02-11 PROCEDURE — G8536 NO DOC ELDER MAL SCRN: HCPCS | Performed by: NURSE PRACTITIONER

## 2022-02-11 PROCEDURE — 1101F PT FALLS ASSESS-DOCD LE1/YR: CPT | Performed by: NURSE PRACTITIONER

## 2022-02-11 RX ORDER — BENZONATATE 200 MG/1
200 CAPSULE ORAL
COMMUNITY
End: 2022-05-06

## 2022-02-11 RX ORDER — FOSINOPIRL SODIUM 10 MG/1
TABLET ORAL
COMMUNITY
Start: 2021-03-08 | End: 2022-02-11 | Stop reason: ALTCHOICE

## 2022-02-11 RX ORDER — BUMETANIDE 2 MG/1
2 TABLET ORAL 2 TIMES DAILY
Qty: 90 TABLET | Refills: 1 | Status: ON HOLD
Start: 2022-02-11 | End: 2022-05-06 | Stop reason: SDUPTHER

## 2022-02-11 NOTE — PROGRESS NOTES
[facility] in [address] [facility],  [facility]. Visit      Patient name: Cholo Rivera  Patient : 1945  Patient MRN: [id number]  Consulting MD: Edyta att. providers found  Date of service: 22    REASON FOR REFERRAL:  Chronic systolic heart failure    PLAN OF CARE:  · 68 y/o male with h/o severe ischemic cardiomyopathy, LVEF 20-25%; stage D, NYHA class IV symptoms on chronic inotropic support with milrinone gtt; patient unerwent LVAD-DT evaluation which was placed on hold due to discovery of lung nodule posittive for adenocarcinoma which required radiation therapy and ICD explant/reimplant; patient did not complete evaluation (EGD/C-scope needed) as he has not been decided if he would like to proceed with LVAD  · Patient recently admitted for acute decompensation of heart failure after recent ?pneumonia treated with Z-pack underwent diuresis  · Continue consideration of LVAD implant as OP     RECOMMENDATIONS/PLAN:  Continue current medical therapy for heart failure  Continue milrinone 0.375 mcg/kg/min - dose adjusted for weight   Intolerant Coreg due to RV dysfunction   ACEi/ARB/ARNi previously discontinued in anticipation of possible cardiac surgery  Continue current dose of eplerenone 50 mg daily  Continue Farxiga 10 mg daily  Continue current dose of Hydralazine 50 TID and Imdur 30 mg twice daily  Reduce Bumex to 2 mg PO BID   Continue ASA 81 mg daily   Continue Effient 10 mg po daily  Continue current dose of rosuvastatin 10 mg daily  Continue ranolazine 500 mg BID (for HR and angina control)  CPAP qHS   Advanced care plan forms completed   Reinforced heart healthy, low salt diet  Reinforced appropriate fluid intake of 6 x 8oz glasses of water per day  Monitor and record daily standing weights and strict I/O's  Follow-up with primary cardiologist Dr. Nestor Barboza     Return for follow up in 2 weeks.      IMPRESSION:  Fatigue at rest  Shortness of breath with exertion  Acute on chronic systolic heart failure  · Stage D, NYHA class IV symptoms improved to class II on inotropes  · Non-ischemic cardiomyopathy, LVEF 20% with LVEDD 6.2  · RV dysfunction, TAPSE 1.22 (prelimnary read)  · TBili 1.5 likely from cardiac congestion  At risk of sudden cardiac death  · Recent cardiac arrest   · S/p ICD (1/2013, [employer] followed by [facility]); New implant on 10/21/2021 [place] [employer]  Coronary artery disease  · S/p multiple interventions  · S/p 4V CABG (8/2012)  · LHC (7/2019) severe stenosis of LIMA to LAD anastomosis site, SVG graft to OM is occluded, SVG graft to RCA 40-50%, LAD occluded proximally, severe proximal LCx, RCA is the long . · PET (6/2019) EF 24% with anterior lateral and inferior reversible defect  Cardiac risk factors  · HTN  · HL  · DM2  · SRUTHI on CPAP  · MIld carotid stenosis  Anemia, microcytic  · Iron deficiency  DVT with filter  Pulmonary nodules   Dysphagia     CARDIAC IMAGING:  ECHO ( 2/2/22):   Left Ventricle: Left ventricle is moderately dilated. Mildly increased wall thickness. Severe global hypokinesis present. Severely reduced left ventricular systolic function with a visually estimated EF of 15 - 20%. Grade II diastolic dysfunction with increased LAP.   Right Ventricle: Right ventricle is mildly dilated. Mildly reduced systolic function.   Aortic Valve: Mild sclerosis of the aortic valve cusps. Mild transvalvular regurgitation.   Mitral Valve: Mild transvalvular regurgitation.   Tricuspid Valve: Moderate transvalvular regurgitation. Mildly elevated RVSP. RVSP is 39 mmHg.   Left Atrium: Left atrium is moderately dilated. Echo (11/23/21):  · LV 15-20%. · Mod-severe, MR, mod-TR     Echo (5/20/21)  · LV: Estimated LVEF is 20 - 25%. Normal wall thickness. Mildly dilated left ventricle. Severely and globally reduced systolic function. Severe (grade 4) left ventricular diastolic dysfunction.   · LA: Severely dilated left atrium. · RA: Severely dilated right atrium. · MV: Moderate mitral valve regurgitation is present. · TV: Moderate tricuspid valve regurgitation is present. · AO: Mild aortic root dilatation. · RV: Pacer/ICD present. · LVED 6.2cm     EKG (5/20/21) SB with 1degree AV block, QRS 116ms     LHC (5/24/21) Native Coronaries: LM - moderate to severe distal disease 50%. % prox occluded (), heavily calcified, LCx: 80% proximal stenosis. OM1 is  (seen filling faintly via collaterals). OM2 99% stenosis. RCA: 100% proximal occluded.  LIMA to LAD: patent, supplies only a diagonal branch (probably D2). The LAD distal to the bifurcation is 100% occluded () and fills via right to left collaterals off the SVG to RCA. SVG to R-PDA: patent with moderate diffuse irregularities but no obstructive disease in the graft.    No appealing interventional targets.      NST as above     ICD Interrogation (11/12/2021) [employer] VVI, thoracic impedence trending up, no events, normal device function and good battery  ICD interrogation (5/12/21) [employer] single lead, no events, normal device function and good battery     HEMODYNAMICS:  RHC 5/24/21 CI 1.97, PA 33/9/17, RA 1, PCWP 6- off milrinone   CPEST not done     Patient underwent a 6 minute walk test 11/12/2021     6 Min Walk Report 11/12/2021 7/6/2021 5/20/2021   (PRE) HR 88 98 74   (PRE) O2 Sat 100 - 99   (POST)  - 81   (POST) O2 Sat 99 98% on Ra 99   Distance in Meters 386.58 - 1158.24            OTHER IMAGING:  CXR Results  (Last 48 hours)    None        CXR (1/24/22): FINDINGS: PA and lateral radiographs of the chest demonstrate a new infiltrate  in the apical segment of the left lower lobe. The cardiac and mediastinal  contours and pulmonary vascularity are stable. The bones and soft tissues are  within normal limits.    IMPRESSION: Left lower lobe pneumonia    CXR (6/17/21) clear    Duplex LE venous Bilal (2/2/2021)  Right Lower Venous  Varicose vein(s) present. Varicose veins are patent. The common femoral, great saphenous, profunda femoral, femoral, popliteal, gastrocnemius, posterior tibial, peroneal and saphenous femoral junction vein(s) were imaged in the transverse and longitudinal planes. The vessels showed normal color filling and compressibility. Doppler interrogation of the veins showed phasic and spontaneous flow. Left Lower Venous  Varicose vein(s) present. Varicose veins are patent. The common femoral, great saphenous, saphenous femoral junction, profunda femoral, femoral, popliteal, gastrocnemius, posterior tibial and peroneal vein(s) were imaged in the transverse and longitudinal planes. The vessels showed normal color filling and compressibility. Doppler interrogation of the veins showed phasic and spontaneous flow. CT chest (2/1/22)  1. No pulmonary emboli. 2. Pleural effusions. 3. Treated RAVEN carcinoma as discussed.     CT 5/21/21 1. Irregular pulmonary nodule in the left upper lobe measuring 2 cm, suspicious for primary pulmonary malignancy. 2. Additional 6 mm left upper lobe pulmonary nodule. 3. Severe calcific atherosclerosis of the coronary arteries and abdominal aorta. No aneurysm. 4. Cardiomegaly. 5. No acute abnormality within the chest, abdomen, or pelvis. HISTORY:   Jo Shah is a 69y.o. year old male with PMH outlined above who presented to [address] [place] [address] last week with dyspnea, PNA. He was treated with abx and adjustment of GDMT and discharge home. INTERVAL HISTORY:   Bienvenido Woodward presents today for hospital discharge follow up. He feels well, much better than on admission. Dyspnea has resolved and he now only has it with moderate exertion. He denies edema, orthopnea, dizziness, lightheadedness, CP, palpitations, nausea, vomiting. No issues with PICC line. He is compliant with his medications and is accompanied today by his wife.       PHYSICAL EXAM:  Visit Vitals  /72 (BP 1 Location: Left arm, BP Patient Position: Sitting, BP Cuff Size: Adult)   Pulse 81   Temp 97.5 °F (36.4 °C) (Oral)   Resp 16   Ht 6' 1\" (1.854 m)   Wt 173 lb 12.8 oz (78.8 kg)   SpO2 98%   BMI 22.93 kg/m²     General: Patient is well developed, well-nourished in no acute distress  HEENT: Normocephalic and atraumatic. No scleral icterus. Pupils are equal, round. No conjunctival injection. Oropharynx is clear. Neck: Supple. No evidence of thyroid enlargements or lymphadenopathy. JVD: Cannot be appreciated   Lungs: Breath sounds are equal and clear bilaterally. No wheezes, rhonchi, or rales. Heart: Regular rate and rhythm with normal S1 and S2. No murmurs, gallops or rubs. Abdomen: Soft, no mass or tenderness. No organomegaly or hernia. Bowel sounds present. Genitourinary and rectal: deferred  Extremities: No cyanosis, clubbing, or edema. Neurologic: No focal sensory or motor deficits are noted. Grossly intact. Psychiatric: Awake, alert an doriented x 3. Appropriate mood and affect. Skin: Warm, dry and well perfused. No lesions, nodules or rashes are noted. REVIEW OF SYSTEMS:  General: Denies fever, night sweats. Ear, nose and throat: Denies difficulty hearing, sinus problems, runny nose, post-nasal drip, ringing in ears, mouth sores, loose teeth, ear pain, nosebleeds, sore throate, facial pain or numbess  Cardiovascular: see above in the interval history  Respiratory: Denies cough, wheezing, sputum production, hemoptysis.    Gastrointestinal: Denies heartburn, constipation, intolerance to certain foods, diarrhea, abdominal pain, nausea, vomiting, difficulty swallowing, blood in stool  Kidney and bladder: Denies painful urination, frequent urination, urgency, prostate problems and impotence  Musculoskeletal: Denies joint pain, muscle weakness  Skin and hair: Denies change in existing skin lesions, hair loss or increase, breast changes    PAST MEDICAL HISTORY:  Past Medical History:   Diagnosis Date    CAD (coronary artery disease) '90 '97, '13 x 2    [place] code [facility] in [facility]    Calculus of kidney     Colonic polyps     Congestive heart failure, unspecified     Diabetes ([facility] [address]     Gastritis     Hypercholesterolemia     Sleep apnea        PAST SURGICAL HISTORY:  Past Surgical History:   Procedure Laterality Date    COLONOSCOPY  04/06/2011    16, due 21    COLONOSCOPY N/A 3/2/2022    COLONOSCOPY    :- performed by Johnie Christianson MD at [facility]. [address], COLON, DIAGNOSTIC      11, due 16    HX CORONARY ARTERY BYPASS GRAFT  8/22/12    x 4 vessel by MISSY Ramirez    HX HEENT      LASIK    HX PACEMAKER PLACEMENT  1/30/13    MA CARDIAC SURG PROCEDURE UNLIST  2012    x 4 vessels       FAMILY HISTORY:  Family History   Problem Relation Age of Onset    Heart Disease Mother         MI    Heart Disease Father         CAD & PVD    Lung Disease Father     Cancer Father         lung    Diabetes Maternal Grandmother     Heart Disease Other         CAD    Stroke Sister        SOCIAL HISTORY:  Social History     Socioeconomic History    Marital status:    Tobacco Use    Smoking status: Never Smoker    Smokeless tobacco: Never Used   Vaping Use    Vaping Use: Never used   Substance and Sexual Activity    Alcohol use: Yes     Alcohol/week: 0.0 standard drinks     Comment:  VERY RARE    Drug use: No       LABORATORY RESULTS:     Labs Latest Ref Rng & Units 5/6/2022 5/5/2022 5/4/2022 5/3/2022 5/2/2022 5/1/2022 4/30/2022   WBC 4.1 - 11.1 K/uL 6.0 6.0 6.5 5.3 5.7 5.5 5.6   RBC 4.10 - 5.70 M/uL 3.37(L) 3.19(L) 3.19(L) 3.19(L) 3.08(L) 2.92(L) 2.95(L)   Hemoglobin 12.1 - 17.0 g/dL 8.6(L) 8.2(L) 8.2(L) 8. 1(L) 7. 9(L) 7. 6(L) 7. 6(L)   Hematocrit 36.6 - 50.3 % 27. 6(L) 26. 0(L) 26. 4(L) 25. 7(L) 25. 8(L) 24. 7(L) 24. 6(L)   MCV 80.0 - 99.0 FL 81.9 81.5 82.8 80.6 83.8 84.6 83.4   Platelets 523 - 551 K/uL 358 326 293 315 298 274 275   Lymphocytes 12 - 49 % - - - - - - -   Monocytes 5 - 13 % - - - - - - -   Eosinophils 0 - 7 % - - - - - - -   Basophils 0 - 1 % - - - - - - -   Bands 0 - 6 % - - - - - - -   Albumin 3.5 - 5.0 g/dL 2. 9(L) 2. 9(L) 3.0(L) 2. 8(L) 2. 9(L) 2. 6(L) 2. 8(L)   Calcium 8.5 - 10.1 MG/DL 8.9 8.6 8.4(L) 8.6 8.2(L) 8.0(L) 7. 7(L)   Glucose 65 - 100 mg/dL 111(H) 100 166(H) 102(H) 95 199(H) 125(H)   BUN 6 - 20 MG/DL 25(H) 24(H) 20 21(H) 18 17 24(H)   Creatinine 0.70 - 1.30 MG/DL 0.93 0.97 0.92 0.86 0.86 0.86 1.01   Sodium 136 - 145 mmol/L 136 137 134(L) 136 138 133(L) 137   Potassium 3.5 - 5.1 mmol/L 3.7 3.5 3.8 3.6 3.6 3.7 4.0   TSH 0.36 - 3.74 uIU/mL - - - - - - -   LDH 85 - 241 U/L 172 172 170 175 163 177 158   Some recent data might be hidden     Lab Results   Component Value Date/Time    TSH 2.26 04/10/2022 03:00 AM    TSH 1.68 03/01/2022 11:50 AM    TSH 1.47 05/26/2021 10:44 PM    TSH 1.97 05/20/2021 04:28 PM    TSH 1.41 02/09/2021 03:05 PM    TSH 1.740 08/14/2018 09:58 AM    TSH 1.710 10/18/2017 12:00 AM       ALLERGY:  Allergies   Allergen Reactions    Oxycodone Anaphylaxis    Pcn [Penicillins] Hives        CURRENT MEDICATIONS:    Current Outpatient Medications:     metFORMIN (GLUCOPHAGE) 500 mg tablet, Take 1 Tablet by mouth two (2) times daily (with meals). , Disp: 180 Tablet, Rfl: 3    acetaminophen (TYLENOL EXTRA STRENGTH) 500 mg tablet, Take 500 mg by mouth every six (6) hours as needed for Fever or Pain., Disp: , Rfl:     aspirin delayed-release 81 mg tablet, Take 81 mg by mouth daily. , Disp: , Rfl:     sacubitril-valsartan (ENTRESTO) 49 mg/51 mg tablet, Take 0.5 Tablets by mouth every twelve (12) hours. (Patient taking differently: Take 1 Tablet by mouth daily.), Disp: 60 Tablet, Rfl: 2    bumetanide (BUMEX) 2 mg tablet, Take 0.5 Tablets by mouth daily. (Patient taking differently: Take 1 Tablet by mouth daily. Take 1 tablet by mouth daily), Disp: 90 Tablet, Rfl: 0    tamsulosin (FLOMAX) 0.4 mg capsule, Take 1 Capsule by mouth daily. , Disp: 30 Capsule, Rfl: 2    eplerenone (INSPRA) 50 mg tab tablet, Take 0.5 Tablets by mouth daily. (Patient taking differently: Take 25 mg by mouth daily. take 1/2  tab po daily), Disp: 90 Tablet, Rfl: 1    hydrALAZINE (APRESOLINE) 50 mg tablet, Take 1 Tablet by mouth three (3) times daily. (Patient taking differently: Take 50 mg by mouth two (2) times a day.), Disp: 180 Tablet, Rfl: 2    docusate sodium (COLACE) 100 mg capsule, Take 1 Capsule by mouth daily for 90 days. , Disp: 30 Capsule, Rfl: 2    famotidine (PEPCID) 20 mg tablet, Take 1 Tablet by mouth every twelve (12) hours. , Disp: 60 Tablet, Rfl: 2    magnesium oxide (MAG-OX) 400 mg tablet, Take 1 Tablet by mouth daily. , Disp: 30 Tablet, Rfl: 2    mirtazapine (REMERON) 7.5 mg tablet, Take 1 Tablet by mouth nightly., Disp: 30 Tablet, Rfl: 2    sucralfate (CARAFATE) 1 gram tablet, Take 1 Tablet by mouth Before breakfast, lunch, dinner and at bedtime. , Disp: 120 Tablet, Rfl: 2    warfarin (COUMADIN) 2.5 mg tablet, Take 2 Tablets by mouth daily. , Disp: 90 Tablet, Rfl: 2    potassium chloride SR (K-TAB) 20 mEq tablet, Take 2 Tablets by mouth two (2) times a day., Disp: 360 Tablet, Rfl: 1    PATIENT CARE TEAM:  Patient Care Team:  Alvenia Apley, MD as PCP - General (Internal Medicine Physician)  Alvenia Apley, MD as PCP - REHABILITATION [facility]  Galileo Lowe MD as Physician (Cardiovascular Disease Physician)  Stella Dangelo MD as Physician (Gastroenterology)  Mark Chavez MD as Physician (Orthopedic Surgery)  Wade Mccord MD as Physician (Ophthalmology)  Ryan Swain MD (Cardiovascular Disease Physician)  Alicia Wesley MD (Cardiovascular Disease Physician)       Thank you for your referral and allowing me to participate in this patient's care.     Lee Horowitz NP   Heart Failure Nurse Practitioner   [phone]   [address], [facility] [place] Suite [facility] Graft: [phone]/ F: [phone]

## 2022-02-11 NOTE — PATIENT INSTRUCTIONS
Medication changes:    Decrease bumex to 2mg twice a day     Decrease potassium to 40 meq in am and 20 meq pm     Discontinue Fosinopril       Please take this to your pharmacy to notify them of the change in medications. Testing Ordered: Other Recommendations: If you gain 2 lbs overnight please return to bumex three times a day and potassium 40 meq in am and 40 meq PM      Ensure your drinking an adequate amount of water with a goal of 6-8 eight ounce glasses (1.5-2 liters) of fluid daily. Your urine should be clear and light yellow straw colored. If your blood pressure begins to consistently run below 90/60 and/or you begin to experience dizziness or lightheadedness, please contact the [facility] at [phone]. Follow up 2 weeks with [facility]      Please monitor your weights daily upon waking and after using the bathroom. Keep a written records of your weights and bring to your next appointment. If you have a weight gain of 3 or more pounds overnight OR 5 or more pounds in one week please contact our office. Thank you for allowing us the privilege of being a part of your healthcare team! Please do not hesitate to contact our office at [phone] with any questions or concerns. Virtual Heart Failure [address] invites you to learn more about heart failure and to share your questions, ideas, and experiences with others. Each month, the Heart Failure Support Group features a new educational topic and a guest speaker, followed by an interactive discussion. Our Heart Failure Nurse Navigator will moderate each session. You will be able to participate by phone, tablet or computer through [employer]. This support group takes place on the 3rd Thursday of each month from 6:00-7:30PM. All individuals living with heart failure and their caregivers are welcome to join.      If you are interested in participating, please contact us at [email or web address] and you will be sent the link to join the [employer].

## 2022-02-14 ENCOUNTER — OFFICE VISIT (OUTPATIENT)
Dept: INTERNAL MEDICINE CLINIC | Age: 77
End: 2022-02-14
Payer: MEDICARE

## 2022-02-14 VITALS
WEIGHT: 169 LBS | TEMPERATURE: 97.8 F | DIASTOLIC BLOOD PRESSURE: 75 MMHG | OXYGEN SATURATION: 99 % | RESPIRATION RATE: 24 BRPM | HEART RATE: 83 BPM | SYSTOLIC BLOOD PRESSURE: 116 MMHG | HEIGHT: 73 IN | BODY MASS INDEX: 22.4 KG/M2

## 2022-02-14 DIAGNOSIS — I50.22 CHRONIC SYSTOLIC HEART FAILURE (HCC): Primary | ICD-10-CM

## 2022-02-14 DIAGNOSIS — C34.92 ADENOCARCINOMA OF LUNG, LEFT (HCC): ICD-10-CM

## 2022-02-14 DIAGNOSIS — I50.9 CHF, STAGE C (HCC): ICD-10-CM

## 2022-02-14 DIAGNOSIS — I25.10 ATHEROSCLEROSIS OF NATIVE CORONARY ARTERY OF NATIVE HEART WITHOUT ANGINA PECTORIS: ICD-10-CM

## 2022-02-14 PROCEDURE — G8427 DOCREV CUR MEDS BY ELIG CLIN: HCPCS | Performed by: INTERNAL MEDICINE

## 2022-02-14 PROCEDURE — 99495 TRANSJ CARE MGMT MOD F2F 14D: CPT | Performed by: INTERNAL MEDICINE

## 2022-02-14 NOTE — PROGRESS NOTES
HISTORY OF PRESENT ILLNESS  Joby Pink is a 69 y.o. male. HPI  Assessment:  Dank Singletary is seen today accompanied by his wife for hospital follow up for recent admission for CHF. He was admitted to the hospital on 02/01/22, discharged on 02/05/22. Nurse navigator contact was on 02/07/22 and this visit is today, the 14th. This represents a transitional care visit. 1. CHF. Symptoms have improved since admission. His regimen was adjusted. He continues on infusion therapy. Plans are in place for him to receive an LVAD. He follows up at the heart failure clinic, as well as [facility]. 2. Diabetes, stable on current regimen. 3. Hypertension, stable on current regimen. 4. Lung cancer, follows up with specialists as directed. They will follow up with me prn, otherwise as previously scheduled in July. I do not need to see him before the LVAD unless a specific concern arises for diabetes management or for some other medical problem. Review of Systems   Constitutional: Positive for weight loss. Respiratory: Positive for shortness of breath. Cardiovascular: Negative for chest pain, palpitations, leg swelling and PND. Musculoskeletal: Negative for myalgias. Neurological: Negative for focal weakness. Physical Exam  Vitals and nursing note reviewed. Constitutional:       General: He is not in acute distress. Cardiovascular:      Rate and Rhythm: Normal rate and regular rhythm. Heart sounds: No murmur heard. No friction rub. No gallop. Pulmonary:      Effort: Pulmonary effort is normal.      Breath sounds: Normal breath sounds. ASSESSMENT and PLAN  Diagnoses and all orders for this visit:    1. Chronic systolic heart failure ([address]    2. Adenocarcinoma of lung, left ([address]    3. CHF, stage C ([address]    4.  Atherosclerosis of native coronary artery of native heart without angina pectoris

## 2022-02-18 ENCOUNTER — TELEPHONE (OUTPATIENT)
Dept: CARDIOLOGY CLINIC | Age: 77
End: 2022-02-18

## 2022-02-18 NOTE — TELEPHONE ENCOUNTER
Called and requested records from Dr. Benny Chao at [place] [address] on [facility]. Awaiting response. Faxed records request to [facility] for [facility] Medicine records. Called and spoke with Arben Gibson at Dr. Herminia Tineo office ([facility]) and requested a call back to schedule a Colonoscopy/EGD with inpatient stay.

## 2022-02-22 ENCOUNTER — TELEPHONE (OUTPATIENT)
Dept: CARDIOLOGY CLINIC | Age: 77
End: 2022-02-22

## 2022-02-23 ENCOUNTER — TELEPHONE (OUTPATIENT)
Dept: CARDIOLOGY CLINIC | Age: 77
End: 2022-02-23

## 2022-02-23 NOTE — PATIENT INSTRUCTIONS
Medication changes:    No medication changes     Please take this to your pharmacy to notify them of the change in medications. Testing Ordered:    No testing today. Other Recommendations:     Please follow up with Podiatry as part of LVAD workup. Please call LVAD Coordinator Alejandro Tidwell) [phone] with date of Sleep Medicine appointment. Report to [address] [place] [address] on 3/1/22 at 0830 for admission for Colonoscopy/EGD. You will need to have a clear liquid diet on Monday 2/28/22 in preparation for Colonoscopy. 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 Your urine should be clear and light yellow straw colored. Follow up in after hospital admission with Rudolph Mccloud      For further patient and caregiver resources as well as access to online LVAD support groups visit [email or web address]       Please bring your daily sheets and medications to your next appointment. Please monitor your weights daily upon waking and after using the bathroom. Keep a written records of your weights and bring to your next appointment. If you have a weight gain of 3 or more pounds overnight OR 5 or more pounds in one week please contact our office. Thank you for allowing us the privilege of being a part of your healthcare team! Please do not hesitate to contact our office at [phone] with any questions or concerns. Virtual Heart Failure [address] invites you to learn more about heart failure and to share your questions, ideas, and experiences with others. Each month, the Heart Failure Support Group features a new educational topic and a guest speaker, followed by an interactive discussion. Our Heart Failure Nurse Navigator will moderate each session.  You will be able to participate by phone, tablet or computer through [address] [place] [address]. This support group takes place on the 3rd Thursday of each month from 6:00-7:30PM. All individuals living with heart failure and their caregivers are welcome to join. If you are interested in participating, please contact us at [email or web address] and you will be sent the link to join the [employer].

## 2022-02-23 NOTE — TELEPHONE ENCOUNTER
Received a fax from [facility] [address] stating they have no records for Mr. Oconnell. Called and clarified with patient's wife who states patient was seen at [facility] by Dr. Audie Nissen. Called the number provided and was notified that [facility] and that they did not have records. Notified Tom Murphy NP who stated she will request patient to be seen again by podiatry for LVAD evaluation.

## 2022-02-23 NOTE — TELEPHONE ENCOUNTER
Telephone Call RE:  Appointment reminder     Outcome:     [x] Patient confirmed appointment   [] Patient rescheduled appointment for    [] Unable to reach  [] Left message             [] Other:     ---------------------             [x] Screened for [facility]

## 2022-02-24 ENCOUNTER — OFFICE VISIT (OUTPATIENT)
Dept: CARDIOLOGY CLINIC | Age: 77
End: 2022-02-24
Payer: MEDICARE

## 2022-02-24 ENCOUNTER — TELEPHONE (OUTPATIENT)
Dept: CARDIOLOGY CLINIC | Age: 77
End: 2022-02-24

## 2022-02-24 VITALS
DIASTOLIC BLOOD PRESSURE: 76 MMHG | RESPIRATION RATE: 16 BRPM | OXYGEN SATURATION: 99 % | HEART RATE: 90 BPM | TEMPERATURE: 97.5 F | SYSTOLIC BLOOD PRESSURE: 116 MMHG | BODY MASS INDEX: 22.35 KG/M2 | WEIGHT: 168.6 LBS | HEIGHT: 73 IN

## 2022-02-24 DIAGNOSIS — Z01.818 PREOP EXAMINATION: Primary | ICD-10-CM

## 2022-02-24 PROCEDURE — G8420 CALC BMI NORM PARAMETERS: HCPCS | Performed by: NURSE PRACTITIONER

## 2022-02-24 PROCEDURE — 99215 OFFICE O/P EST HI 40 MIN: CPT | Performed by: NURSE PRACTITIONER

## 2022-02-24 PROCEDURE — G8536 NO DOC ELDER MAL SCRN: HCPCS | Performed by: NURSE PRACTITIONER

## 2022-02-24 PROCEDURE — 1101F PT FALLS ASSESS-DOCD LE1/YR: CPT | Performed by: NURSE PRACTITIONER

## 2022-02-24 PROCEDURE — 1111F DSCHRG MED/CURRENT MED MERGE: CPT | Performed by: NURSE PRACTITIONER

## 2022-02-24 PROCEDURE — G8427 DOCREV CUR MEDS BY ELIG CLIN: HCPCS | Performed by: NURSE PRACTITIONER

## 2022-02-24 PROCEDURE — G8432 DEP SCR NOT DOC, RNG: HCPCS | Performed by: NURSE PRACTITIONER

## 2022-02-24 NOTE — TELEPHONE ENCOUNTER
Called and spoke with patient's wife who verified that patient is taking Effient 10 mg daily. Sharee Rice verbalized understanding to make Friday 2/25/22 his last dose of Effient unless he hears back from [facility] (Veronica Foley NP). Anshul Dwyer NP made aware.

## 2022-02-24 NOTE — PROGRESS NOTES
[facility], [facility] Note    Patient name: Van Flanagan  Patient : 1945  Patient MRN: [id number]  Date of service: 22    CHIEF COMPLAINT:  Follow up Appointment     PLAN OF CARE:   · Severe ischemic cardiomyopathy, LVEF 20-25%; stage D, NYHA class IV symptoms; patient unerwent LVAD-DT evaluation; lung nodule biopsy was positive for adenocarcinoma, patient has completed radiation   · Now interested in completion of LVAD eval and implantation  · Scheduled admission for EGD and colonoscopy  and completion of remaining evaluation   · Meanwhile, we continue chronic palliative infusion of milrinone, and working on optimizing nutritional status and muscle conditioning    RECOMMENDATIONS:  Continue current medical therapy for heart failure  Continue milrinone 0.3mcg/kg/min - adjust dose to 76.5 kg   TTE 2021 shows LVEF 15-20%, mod-severe MR, mod TR   Intolerant Coreg due to RV dysfunction   ACEi/ARB/ARNi previously discontinued in anticipation of cardiac surgery  Continue current dose of eplerenone 50mg daily  Continue Farxiga 10mg daily  Continue current dose of Hydralazine 50 TID and Imdur 30 mg twice daily  Continue bumex 2 mg qAM and 1 mg qPM   Continue ASA 81 mg daily   Hold Effient on  for upcoming scopes on 3/2   Continue current dose of rosuvastatin 10 mg daily  Continue ranolazine 500 mg BID (for HR and angina control)  Continue CPAP therapy - uses most of the time   Advanced care plan forms completed   Genetic test negative   Obtain AICD interrogation today  Reinforced heart healthy, low salt diet  Reinforced appropriate fluid intake of 6 x 8oz glasses of water per day  Monitor and record daily weights and BPs  Follow-up with primary cardiologist Dr. Javon Chacko with EP cardiologist  Follow-up with PCP  Follow-up with Cardiac Rehab  Fully vaccinated     Return for admission to [facility] on 3/1       IMPRESSION:  Fatigue at rest  Shortness of breath with exertion  Acute on chronic systolic heart failure  · Stage D, NYHA class IV symptoms improved to class II on inotropes  · Non-ischemic cardiomyopathy, LVEF 20% with LVEDD 6.2  · RV dysfunction, TAPSE 1.22 (prelimnary read)  · TBili 1.5 likely from cardiac congestion  At risk of sudden cardiac death  · Recent cardiac arrest   · S/p ICD (1/2013, [employer] followed by [facility]); New implant on 10/21/2021 [employer]  Coronary artery disease  · S/p multiple interventions  · S/p 4V CABG (8/2012)  · LHC (7/2019) severe stenosis of LIMA to LAD anastomosis site, SVG graft to OM is occluded, SVG graft to RCA 40-50%, LAD occluded proximally, severe proximal LCx, RCA is the long . · PET (6/2019) EF 24% with anterior lateral and inferior reversible defect  Cardiac risk factors  · HTN  · HL  · DM2  · SRUTHI on CPAP  · MIld carotid stenosis  Anemia, microcytic  · Iron deficiency  DVT with filter  Pulmonary nodules   Dysphagia     CARDIAC IMAGING:  Echo (11/23/21):  · LV 15-20%. · Mod-severe, MR, mod-TR    Echo (5/20/21)  · LV: Estimated LVEF is 20 - 25%. Normal wall thickness. Mildly dilated left ventricle. Severely and globally reduced systolic function. Severe (grade 4) left ventricular diastolic dysfunction. · LA: Severely dilated left atrium. · RA: Severely dilated right atrium. · MV: Moderate mitral valve regurgitation is present. · TV: Moderate tricuspid valve regurgitation is present. · AO: Mild aortic root dilatation. · RV: Pacer/ICD present. · LVED 6.2cm    EKG (5/20/21) SB with 1degree AV block, QRS 116ms     LHC (5/24/21) Native Coronaries: LM - moderate to severe distal disease 50%. % prox occluded (), heavily calcified, LCx: 80% proximal stenosis. OM1 is  (seen filling faintly via collaterals). OM2 99% stenosis. RCA: 100% proximal occluded.  LIMA to LAD: patent, supplies only a diagonal branch (probably D2). The LAD distal to the bifurcation is 100% occluded () and fills via right to left collaterals off the SVG to RCA. SVG to R-PDA: patent with moderate diffuse irregularities but no obstructive disease in the graft.    No appealing interventional targets.      NST as above     ICD Interrogation (11/12/2021) [employer] VVI, thoracic impedence trending up, no events, normal device function and good battery  ICD interrogation (5/12/21) [employer] single lead, no events, normal device function and good battery     HEMODYNAMICS:  RHC 5/24/21 CI 1.97, PA 33/9/17, RA 1, PCWP 6- off milrinone   CPEST not done     Patient underwent a 6 minute walk test 11/12/2021    6 Min Walk Report 11/12/2021 7/6/2021 5/20/2021   (PRE) HR 88 98 74   (PRE) O2 Sat 100 - 99   (POST)  - 81   (POST) O2 Sat 99 98% on Ra 99   Distance in Meters 386.58 - 1158.24         OTHER IMAGING:  CXR (6/17/21) clear  CT 5/21/21 1. Irregular pulmonary nodule in the left upper lobe measuring 2 cm, suspicious for primary pulmonary malignancy. 2. Additional 6 mm left upper lobe pulmonary nodule. 3. Severe calcific atherosclerosis of the coronary arteries and abdominal aorta. No aneurysm. 4. Cardiomegaly. 5. No acute abnormality within the chest, abdomen, or pelvis. HISTORY OF PRESENT ILLNESS:  I had the pleasure of seeing Julia Kim in [address] [place] [address] at [facility] in [place].     Briefly, Julia Kim is a 69 y.o. male with h/o HTN, HL, DM2, SRUTHI on CPAP, chronic systolic heart failure, stage D, NYHA class IV symptoms, non-ischemic cardiomyopathy, LVEF 20% with LVEDD 6.2, RV dysfunciton, TAPSE 1.22, TBili 1.5 likely from cardiac congestion, recent cardiac arrest s/p ICD (1/2013, [employer] followed by [facility]), coronary artery disease s/p multiple interventions s/p 4V CABG (8/2012), LHC (7/2019) severe stenosis of LIMA to LAD anastomosis site, SVG graft to OM is occluded, SVG graft to RCA 40-50%, LAD occluded proximally, severe proximal LCx, RCA is the long . PET (6/2019) EF 24% with anterior lateral and inferior reversible defect. Anemia, microcytic with iron deficiency, DVT with filter.     Patient was referred to [facility] by Dr. Odessa Casas for evaluation of his candidacy for advanced therapies.     Patient agreed to inpatient initiation of palliative infusion of chronic inotropes and evaluation for LVAD-DT. Patient was admitted 5/2-5/25/21. Patient was started on milrinone infusion and had first part of LVAD evaluation completed. Right and left heart cath on 5/24/21 that showed severe diffuse native vessel CAD, with patent grafts (LIMA to D2, SVG to RCA).  Antiplatelet therapy was held during hospitalization and after discharge due to anticipated pulmonary nodule biopsy, bone marrow biopsy and EGD/C-Scope as part of LVAD workup.     Patient was readmitted 5/26-5/28/21 with hypertension, headache and chest pain, his troponin peaked at 10; he was shortly bridged with heparin, otherwise had normal EKG and chest CT negative for PE. Cardiology and AHF service was consulted and patient was discharged home after troponin came down.     Patient has now completed radiation treatment for spot in the lungs. Hem-onc has cleared patient. Patient will follow up with his pulmonary MD in 1-2 months. INTERVAL HISTORY:  Today, I had the pleasure of seeing Mr. Dajuan Randolph in clinic accompanied by his wife. Aside from some fatigue and BRAND at times Patient can perform home activities without problem. He denies other cardiac symptoms such as chest pain or leg pain with walking. He reports no volume overload or leg edema. Reports a good appetite. He also denies abdominal bloating, nausea or early satiety. Patient's weight remained stable. He denies orthopnea, PND or nocturia. Denies irregular heart rate or palpitations. No presyncope or syncope. ICD has not fired.  Patient is compliant with fluid restriction and taking medications as prescribed. Patient manages his own medications. REVIEW OF SYSTEMS:  Review of Systems   Constitutional: Positive for weight loss. HENT: Negative. Eyes: Negative. Respiratory: Positive for shortness of breath. Cardiovascular: Negative for chest pain, palpitations and claudication. Gastrointestinal: Negative. Genitourinary: Negative. Musculoskeletal: Negative. Skin: Negative. Neurological: Negative for dizziness, focal weakness and weakness. Psychiatric/Behavioral: Negative. PHYSICAL EXAM:  Visit Vitals  /76 (BP 1 Location: Left arm, BP Patient Position: Sitting, BP Cuff Size: Adult)   Pulse 90   Temp 97.5 °F (36.4 °C) (Oral)   Resp 16   Ht 6' 1\" (1.854 m)   Wt 168 lb 9.6 oz (76.5 kg)   SpO2 99%   BMI 22.24 kg/m²     Physical Exam  Constitutional:       Appearance: He is normal weight. HENT:      Head: Normocephalic and atraumatic. Eyes:      Extraocular Movements: Extraocular movements intact. Conjunctiva/sclera: Conjunctivae normal.   Cardiovascular:      Rate and Rhythm: Normal rate and regular rhythm. Pulses: Normal pulses. Pulmonary:      Breath sounds: Examination of the left-lower field reveals decreased breath sounds. Decreased breath sounds present. Abdominal:      General: Abdomen is flat. Bowel sounds are normal.      Palpations: Abdomen is soft. Musculoskeletal:      Cervical back: Normal range of motion and neck supple. Right lower leg: No edema. Left lower leg: No edema. Skin:     General: Skin is warm and dry. Neurological:      General: No focal deficit present. Mental Status: He is alert and oriented to person, place, and time.    Psychiatric:         Mood and Affect: Mood normal.          PAST MEDICAL HISTORY:  Past Medical History:   Diagnosis Date    CAD (coronary artery disease) '90 '97, '13 x 2    [place] [facility] [place]    Calculus of kidney     Colonic polyps     Congestive heart failure, unspecified     Diabetes ([facility] [address]     Gastritis     Hypercholesterolemia     Sleep apnea        PAST SURGICAL HISTORY:  Past Surgical History:   Procedure Laterality Date    COLONOSCOPY  04/06/2011    16, due 21    ENDOSCOPY, COLON, DIAGNOSTIC      11, due 16    HX CORONARY ARTERY BYPASS GRAFT  8/22/12    x 4 vessel by MISSY Ramirez    HX HEENT      LASIK    HX PACEMAKER PLACEMENT  1/30/13    [place] CARDIAC SURG PROCEDURE UNLIST  2012    x 4 vessels       FAMILY HISTORY:  Family History   Problem Relation Age of Onset    Heart Disease Mother         MI    Heart Disease Father         CAD & PVD    Lung Disease Father     Cancer Father         lung    Diabetes Maternal Grandmother     Heart Disease Other         CAD    Stroke Sister        SOCIAL HISTORY:  Social History     Socioeconomic History    Marital status:    Tobacco Use    Smoking status: Never Smoker    Smokeless tobacco: Never Used   Vaping Use    Vaping Use: Never used   Substance and Sexual Activity    Alcohol use: Yes     Alcohol/week: 0.0 standard drinks     Comment:  VERY RARE    Drug use: No       LABORATORY RESULTS:  Labs Latest Ref Rng & Units 2/5/2022 2/4/2022 2/3/2022 2/2/2022 2/2/2022 2/1/2022   WBC 4.1 - 11.1 K/uL 3.4(L) 3. 3(L) 2. 6(L) - - 3. 0(L)   RBC 4.10 - 5.70 M/uL 4.48 4.42 4.38 - - 5.09   Hemoglobin 12.1 - 17.0 g/dL 10. 2(L) 10. 2(L) 10. 1(L) - - 11. 7(L)   Hematocrit 36.6 - 50.3 % 32. 4(L) 31. 9(L) 31. 4(L) - - 37.5   MCV 80.0 - 99.0 FL 72. 3(L) 72. 2(L) 71. 7(L) - - 73. 7(L)   Platelets 009 - 477 K/uL 135(L) 125(L) 123(L) - - 144(L)   Lymphocytes 12 - 49 % 19 26 29 - - 21   Monocytes 5 - 13 % 14(H) 15(H) 15(H) - - 11   Eosinophils 0 - 7 % 2 3 4 - - 1   Basophils 0 - 1 % 1 1 1 - - 1   Albumin 3.5 - 5.0 g/dL 3. 3(L) 3. 2(L) 2. 9(L) - - 3.6   Calcium 8.5 - 10.1 MG/DL 9.4 8.8 8.6 9.1 8.8 9.6   Glucose 65 - 100 mg/dL 46(LL) 66 101(H) 200(H) 213(H) 141(H)   BUN 6 - 20 MG/DL 20 22(H) 20 21(H) 21(H) 22(H)   Creatinine 0.70 - 1.30 MG/DL 1.30 1.24 1.15 1.26 1.18 1.38(H)   Sodium 136 - 145 mmol/L 136 137 138 136 137 136   Potassium 3.5 - 5.1 mmol/L 3.6 3.9 3.8 3.6 3. 2(L) 3.9   Some recent data might be hidden       ALLERGY:  Allergies   Allergen Reactions    Oxycodone Anaphylaxis    Pcn [Penicillins] Hives        CURRENT MEDICATIONS:    Current Outpatient Medications:     benzonatate (TESSALON) 200 mg capsule, Take 200 mg by mouth three (3) times daily as needed for Cough. , Disp: , Rfl:     milrinone (PRIMACOR) 20 mg/100 mL (200 mcg/mL) infusion, 31.688 mcg/min by IntraVENous route continuous. (Patient taking differently: 0.375 mcg/kg/min by IntraVENous route continuous. Dosed at 84.5 kg), Disp: 500 mL, Rfl: 1    prasugreL (Effient) 10 mg tablet, Take 10 mg by mouth daily. , Disp: , Rfl:     metFORMIN (GLUCOPHAGE) 500 mg tablet, Take 1 Tablet by mouth two (2) times daily (with meals). , Disp: 180 Tablet, Rfl: 3    ranolazine ER (Ranexa) 500 mg SR tablet, Take 1 Tablet by mouth two (2) times a day., Disp: 180 Tablet, Rfl: 3    eplerenone (INSPRA) 50 mg tab tablet, Take 1 Tablet by mouth daily. , Disp: 90 Tablet, Rfl: 3    isosorbide mononitrate ER (IMDUR) 30 mg tablet, Take 1 Tablet by mouth every twelve (12) hours. , Disp: 180 Tablet, Rfl: 1    hydrALAZINE (APRESOLINE) 50 mg tablet, TAKE 1 TABLET BY MOUTH THREE TIMES DAILY, Disp: 180 Tablet, Rfl: 2    dapagliflozin (Farxiga) 10 mg tab tablet, Take 1 Tablet by mouth daily. , Disp: 90 Tablet, Rfl: 1    diphenhydrAMINE (Benadryl Allergy) 25 mg tablet, Take 25 mg by mouth every six (6) hours as needed.  Needed every night d/t picc line causing itching, Disp: , Rfl:     glucose blood VI test strips (OneTouch Ultra Test) strip, USE TO TEST BLOOD SUGAR DAILY, Disp: 100 Strip, Rfl: 3    OneTouch Delica Plus Lancet 33 gauge misc, USE TO TEST BLOOD SUGAR DAILY, Disp: 100 Lancet, Rfl: 3    omeprazole (PRILOSEC) 20 mg capsule, TAKE 1 CAPSULE BY MOUTH DAILY FOR INDIGESTION, Disp: 30 Capsule, Rfl: 1    rosuvastatin (CRESTOR) 10 mg tablet, TAKE 1 TABLET NIGHTLY, Disp: 90 Tab, Rfl: 3    tamsulosin (FLOMAX) 0.4 mg capsule, TAKE 1 CAPSULE DAILY, Disp: 90 Cap, Rfl: 3    lancets misc, Use to test blood sugar daily, Disp: 100 Each, Rfl: 11    acetaminophen (TYLENOL EXTRA STRENGTH) 500 mg tablet, Take  by mouth every six (6) hours as needed. , Disp: , Rfl:     aspirin delayed-release 81 mg tablet, Take 81 mg by mouth daily. , Disp: , Rfl:     bumetanide (BUMEX) 2 mg tablet, Take 1 Tablet by mouth two (2) times a day. (Patient taking differently: Take 2 mg by mouth two (2) times a day. 2 mg in am 1mg in evening), Disp: 90 Tablet, Rfl: 1    potassium chloride SR (K-TAB) 20 mEq tablet, Take 2 Tablets by mouth two (2) times a day. (Patient taking differently: Take 40 mEq by mouth two (2) times a day. Two tablets in am one tablet pm), Disp: 120 Tablet, Rfl: 2      Patient Care Team:  Harshal Royal MD as PCP - General (Internal Medicine)  Harshal Royal MD as PCP - [facility]ATION [facility]  Hailey Hanks MD as Physician (Cardiology)  Yulia Vogel MD as Physician (Cardiology)  Houston Bourne MD as Physician (Gastroenterology)  Tameka Rodriguez MD as Physician (Orthopedic Surgery)  Ashanti Raymodn MD as Physician (Ophthalmology)  Layvonne Shilling Wilms, MD as Physician ([address])  Nelda Fernandez MD (Cardiology)  Wilda Metcalf MD (Cardiology)      Thank you for your referral and allowing me to participate in this patient's care.     Desirae Joya NP  Heart Failure Nurse Practitioner   [address]olas Whitaker [address]   [address], [facility] [address] / Neda Simmons: [phone]/ F: [phone]

## 2022-03-01 ENCOUNTER — TELEPHONE (OUTPATIENT)
Dept: CARDIOLOGY CLINIC | Age: 77
End: 2022-03-01

## 2022-03-01 ENCOUNTER — HOSPITAL ENCOUNTER (OUTPATIENT)
Age: 77
Setting detail: OBSERVATION
Discharge: HOME HEALTH | End: 2022-03-04
Attending: INTERNAL MEDICINE | Admitting: FAMILY MEDICINE
Payer: MEDICARE

## 2022-03-01 DIAGNOSIS — G47.30 UNSPECIFIED SLEEP APNEA: ICD-10-CM

## 2022-03-01 DIAGNOSIS — E11.51 TYPE 2 DIABETES, CONTROLLED, WITH PERIPHERAL CIRCULATORY DISORDER (HCC): ICD-10-CM

## 2022-03-01 DIAGNOSIS — E11.9 TYPE 2 DIABETES MELLITUS WITHOUT COMPLICATION, WITHOUT LONG-TERM CURRENT USE OF INSULIN (HCC): ICD-10-CM

## 2022-03-01 DIAGNOSIS — I50.22 SYSTOLIC CHF, CHRONIC (HCC): ICD-10-CM

## 2022-03-01 DIAGNOSIS — I50.23 ACUTE ON CHRONIC CLINICAL SYSTOLIC HEART FAILURE (HCC): ICD-10-CM

## 2022-03-01 DIAGNOSIS — Z79.899 RECEIVING INOTROPIC MEDICATION: ICD-10-CM

## 2022-03-01 DIAGNOSIS — K21.00 GASTROESOPHAGEAL REFLUX DISEASE WITH ESOPHAGITIS WITHOUT HEMORRHAGE: ICD-10-CM

## 2022-03-01 LAB
ALBUMIN SERPL-MCNC: 3.6 G/DL (ref 3.5–5)
ALBUMIN/GLOB SERPL: 1 {RATIO} (ref 1.1–2.2)
ALP SERPL-CCNC: 191 U/L (ref 45–117)
ALT SERPL-CCNC: 15 U/L (ref 12–78)
ANION GAP SERPL CALC-SCNC: 7 MMOL/L (ref 5–15)
APPEARANCE UR: CLEAR
AST SERPL-CCNC: 60 U/L (ref 15–37)
BASOPHILS # BLD: 0 K/UL (ref 0–0.1)
BASOPHILS NFR BLD: 1 % (ref 0–1)
BILIRUB SERPL-MCNC: 1 MG/DL (ref 0.2–1)
BILIRUB UR QL: NEGATIVE
BNP SERPL-MCNC: 3009 PG/ML
BUN SERPL-MCNC: 21 MG/DL (ref 6–20)
BUN/CREAT SERPL: 18 (ref 12–20)
CALCIUM SERPL-MCNC: 9.4 MG/DL (ref 8.5–10.1)
CHLORIDE SERPL-SCNC: 97 MMOL/L (ref 97–108)
CHOLEST SERPL-MCNC: 134 MG/DL
CO2 SERPL-SCNC: 28 MMOL/L (ref 21–32)
COLOR UR: ABNORMAL
COVID-19 RAPID TEST, COVR: NOT DETECTED
CREAT SERPL-MCNC: 1.18 MG/DL (ref 0.7–1.3)
DIFFERENTIAL METHOD BLD: ABNORMAL
EOSINOPHIL # BLD: 0.1 K/UL (ref 0–0.4)
EOSINOPHIL NFR BLD: 2 % (ref 0–7)
ERYTHROCYTE [DISTWIDTH] IN BLOOD BY AUTOMATED COUNT: 24 % (ref 11.5–14.5)
GLOBULIN SER CALC-MCNC: 3.7 G/DL (ref 2–4)
GLUCOSE BLD STRIP.AUTO-MCNC: 148 MG/DL (ref 65–117)
GLUCOSE BLD STRIP.AUTO-MCNC: 95 MG/DL (ref 65–117)
GLUCOSE SERPL-MCNC: 134 MG/DL (ref 65–100)
GLUCOSE UR STRIP.AUTO-MCNC: >1000 MG/DL
HCT VFR BLD AUTO: 37.7 % (ref 36.6–50.3)
HDLC SERPL-MCNC: 64 MG/DL
HDLC SERPL: 2.1 {RATIO} (ref 0–5)
HGB BLD-MCNC: 11.8 G/DL (ref 12.1–17)
HGB UR QL STRIP: NEGATIVE
IMM GRANULOCYTES # BLD AUTO: 0 K/UL (ref 0–0.04)
IMM GRANULOCYTES NFR BLD AUTO: 0 % (ref 0–0.5)
INR PPP: 1.2 (ref 0.9–1.1)
KETONES UR QL STRIP.AUTO: NEGATIVE MG/DL
LACTATE SERPL-SCNC: 1.4 MMOL/L (ref 0.4–2)
LDLC SERPL CALC-MCNC: 56.2 MG/DL (ref 0–100)
LEUKOCYTE ESTERASE UR QL STRIP.AUTO: NEGATIVE
LYMPHOCYTES # BLD: 0.5 K/UL (ref 0.8–3.5)
LYMPHOCYTES NFR BLD: 18 % (ref 12–49)
MAGNESIUM SERPL-MCNC: 2.3 MG/DL (ref 1.6–2.4)
MCH RBC QN AUTO: 23.4 PG (ref 26–34)
MCHC RBC AUTO-ENTMCNC: 31.3 G/DL (ref 30–36.5)
MCV RBC AUTO: 74.8 FL (ref 80–99)
MONOCYTES # BLD: 0.4 K/UL (ref 0–1)
MONOCYTES NFR BLD: 14 % (ref 5–13)
NEUTS SEG # BLD: 2 K/UL (ref 1.8–8)
NEUTS SEG NFR BLD: 65 % (ref 32–75)
NITRITE UR QL STRIP.AUTO: NEGATIVE
NRBC # BLD: 0 K/UL (ref 0–0.01)
NRBC BLD-RTO: 0 PER 100 WBC
PH UR STRIP: 5.5 [PH] (ref 5–8)
PLATELET # BLD AUTO: 154 K/UL (ref 150–400)
POTASSIUM SERPL-SCNC: 3.6 MMOL/L (ref 3.5–5.1)
PROT SERPL-MCNC: 7.3 G/DL (ref 6.4–8.2)
PROT UR STRIP-MCNC: NEGATIVE MG/DL
PROTHROMBIN TIME: 12 SEC (ref 9–11.1)
RBC # BLD AUTO: 5.04 M/UL (ref 4.1–5.7)
RBC MORPH BLD: ABNORMAL
SERVICE CMNT-IMP: ABNORMAL
SERVICE CMNT-IMP: NORMAL
SODIUM SERPL-SCNC: 132 MMOL/L (ref 136–145)
SOURCE, COVRS: NORMAL
SP GR UR REFRACTOMETRY: 1.02 (ref 1–1.03)
T4 FREE SERPL-MCNC: 1.3 NG/DL (ref 0.8–1.5)
TRIGL SERPL-MCNC: 69 MG/DL (ref ?–150)
TSH SERPL DL<=0.05 MIU/L-ACNC: 1.68 UIU/ML (ref 0.36–3.74)
UR CULT HOLD, URHOLD: NORMAL
UROBILINOGEN UR QL STRIP.AUTO: 0.2 EU/DL (ref 0.2–1)
VLDLC SERPL CALC-MCNC: 13.8 MG/DL
WBC # BLD AUTO: 3 K/UL (ref 4.1–11.1)

## 2022-03-01 PROCEDURE — 84443 ASSAY THYROID STIM HORMONE: CPT

## 2022-03-01 PROCEDURE — 83735 ASSAY OF MAGNESIUM: CPT

## 2022-03-01 PROCEDURE — 85610 PROTHROMBIN TIME: CPT

## 2022-03-01 PROCEDURE — 74011000250 HC RX REV CODE- 250: Performed by: FAMILY MEDICINE

## 2022-03-01 PROCEDURE — 83605 ASSAY OF LACTIC ACID: CPT

## 2022-03-01 PROCEDURE — 65270000029 HC RM PRIVATE

## 2022-03-01 PROCEDURE — 85025 COMPLETE CBC W/AUTO DIFF WBC: CPT

## 2022-03-01 PROCEDURE — 96374 THER/PROPH/DIAG INJ IV PUSH: CPT

## 2022-03-01 PROCEDURE — 83880 ASSAY OF NATRIURETIC PEPTIDE: CPT

## 2022-03-01 PROCEDURE — 65660000000 HC RM CCU STEPDOWN

## 2022-03-01 PROCEDURE — 81003 URINALYSIS AUTO W/O SCOPE: CPT

## 2022-03-01 PROCEDURE — 87635 SARS-COV-2 COVID-19 AMP PRB: CPT

## 2022-03-01 PROCEDURE — 99214 OFFICE O/P EST MOD 30 MIN: CPT | Performed by: INTERNAL MEDICINE

## 2022-03-01 PROCEDURE — 99232 SBSQ HOSP IP/OBS MODERATE 35: CPT | Performed by: CLINICAL NURSE SPECIALIST

## 2022-03-01 PROCEDURE — 36415 COLL VENOUS BLD VENIPUNCTURE: CPT

## 2022-03-01 PROCEDURE — 93005 ELECTROCARDIOGRAM TRACING: CPT

## 2022-03-01 PROCEDURE — 99222 1ST HOSP IP/OBS MODERATE 55: CPT | Performed by: INTERNAL MEDICINE

## 2022-03-01 PROCEDURE — 82962 GLUCOSE BLOOD TEST: CPT

## 2022-03-01 PROCEDURE — 74011000250 HC RX REV CODE- 250: Performed by: NURSE PRACTITIONER

## 2022-03-01 PROCEDURE — 84439 ASSAY OF FREE THYROXINE: CPT

## 2022-03-01 PROCEDURE — G0378 HOSPITAL OBSERVATION PER HR: HCPCS

## 2022-03-01 PROCEDURE — 94760 N-INVAS EAR/PLS OXIMETRY 1: CPT

## 2022-03-01 PROCEDURE — 80053 COMPREHEN METABOLIC PANEL: CPT

## 2022-03-01 PROCEDURE — 74011250636 HC RX REV CODE- 250/636: Performed by: NURSE PRACTITIONER

## 2022-03-01 PROCEDURE — 74011250637 HC RX REV CODE- 250/637: Performed by: NURSE PRACTITIONER

## 2022-03-01 PROCEDURE — 80061 LIPID PANEL: CPT

## 2022-03-01 RX ORDER — RANOLAZINE 500 MG/1
500 TABLET, EXTENDED RELEASE ORAL EVERY 12 HOURS
Refills: 3 | Status: DISCONTINUED | OUTPATIENT
Start: 2022-03-01 | End: 2022-03-04 | Stop reason: HOSPADM

## 2022-03-01 RX ORDER — ISOSORBIDE MONONITRATE 30 MG/1
30 TABLET, EXTENDED RELEASE ORAL EVERY 12 HOURS
Status: DISCONTINUED | OUTPATIENT
Start: 2022-03-01 | End: 2022-03-04 | Stop reason: HOSPADM

## 2022-03-01 RX ORDER — MAGNESIUM SULFATE 100 %
4 CRYSTALS MISCELLANEOUS AS NEEDED
Status: DISCONTINUED | OUTPATIENT
Start: 2022-03-01 | End: 2022-03-04 | Stop reason: HOSPADM

## 2022-03-01 RX ORDER — INSULIN LISPRO 100 [IU]/ML
INJECTION, SOLUTION INTRAVENOUS; SUBCUTANEOUS EVERY 6 HOURS
Status: DISCONTINUED | OUTPATIENT
Start: 2022-03-01 | End: 2022-03-04 | Stop reason: HOSPADM

## 2022-03-01 RX ORDER — HEPARIN SODIUM 5000 [USP'U]/ML
5000 INJECTION, SOLUTION INTRAVENOUS; SUBCUTANEOUS EVERY 8 HOURS
Status: DISCONTINUED | OUTPATIENT
Start: 2022-03-01 | End: 2022-03-01

## 2022-03-01 RX ORDER — ROSUVASTATIN CALCIUM 10 MG/1
10 TABLET, COATED ORAL
Status: DISCONTINUED | OUTPATIENT
Start: 2022-03-01 | End: 2022-03-04 | Stop reason: HOSPADM

## 2022-03-01 RX ORDER — ACETAMINOPHEN 500 MG
500 TABLET ORAL
Status: DISCONTINUED | OUTPATIENT
Start: 2022-03-01 | End: 2022-03-01 | Stop reason: SDUPTHER

## 2022-03-01 RX ORDER — MAGNESIUM SULFATE 100 %
4 CRYSTALS MISCELLANEOUS AS NEEDED
Status: DISCONTINUED | OUTPATIENT
Start: 2022-03-01 | End: 2022-03-01 | Stop reason: SDUPTHER

## 2022-03-01 RX ORDER — ACETAMINOPHEN 325 MG/1
650 TABLET ORAL
Status: DISCONTINUED | OUTPATIENT
Start: 2022-03-01 | End: 2022-03-04 | Stop reason: HOSPADM

## 2022-03-01 RX ORDER — INSULIN LISPRO 100 [IU]/ML
INJECTION, SOLUTION INTRAVENOUS; SUBCUTANEOUS
Status: DISCONTINUED | OUTPATIENT
Start: 2022-03-01 | End: 2022-03-01 | Stop reason: SDUPTHER

## 2022-03-01 RX ORDER — PANTOPRAZOLE SODIUM 20 MG/1
20 TABLET, DELAYED RELEASE ORAL
Status: DISCONTINUED | OUTPATIENT
Start: 2022-03-02 | End: 2022-03-04 | Stop reason: HOSPADM

## 2022-03-01 RX ORDER — HYDRALAZINE HYDROCHLORIDE 50 MG/1
50 TABLET, FILM COATED ORAL 3 TIMES DAILY
Status: DISCONTINUED | OUTPATIENT
Start: 2022-03-01 | End: 2022-03-04 | Stop reason: HOSPADM

## 2022-03-01 RX ORDER — ASPIRIN 81 MG/1
81 TABLET ORAL DAILY
Status: DISCONTINUED | OUTPATIENT
Start: 2022-03-02 | End: 2022-03-04 | Stop reason: HOSPADM

## 2022-03-01 RX ORDER — TAMSULOSIN HYDROCHLORIDE 0.4 MG/1
0.4 CAPSULE ORAL DAILY
Status: DISCONTINUED | OUTPATIENT
Start: 2022-03-02 | End: 2022-03-04 | Stop reason: HOSPADM

## 2022-03-01 RX ORDER — POTASSIUM CHLORIDE 750 MG/1
40 TABLET, FILM COATED, EXTENDED RELEASE ORAL 2 TIMES DAILY
Status: DISCONTINUED | OUTPATIENT
Start: 2022-03-01 | End: 2022-03-04 | Stop reason: HOSPADM

## 2022-03-01 RX ORDER — MILRINONE LACTATE 0.2 MG/ML
0.38 INJECTION, SOLUTION INTRAVENOUS CONTINUOUS
Status: DISCONTINUED | OUTPATIENT
Start: 2022-03-01 | End: 2022-03-04 | Stop reason: HOSPADM

## 2022-03-01 RX ORDER — SODIUM CHLORIDE 0.9 % (FLUSH) 0.9 %
5-40 SYRINGE (ML) INJECTION AS NEEDED
Status: DISCONTINUED | OUTPATIENT
Start: 2022-03-01 | End: 2022-03-04 | Stop reason: HOSPADM

## 2022-03-01 RX ORDER — BUMETANIDE 1 MG/1
2 TABLET ORAL 2 TIMES DAILY
Status: DISCONTINUED | OUTPATIENT
Start: 2022-03-01 | End: 2022-03-04 | Stop reason: HOSPADM

## 2022-03-01 RX ORDER — SODIUM CHLORIDE 0.9 % (FLUSH) 0.9 %
5-40 SYRINGE (ML) INJECTION EVERY 8 HOURS
Status: DISCONTINUED | OUTPATIENT
Start: 2022-03-01 | End: 2022-03-04 | Stop reason: HOSPADM

## 2022-03-01 RX ORDER — EPLERENONE 25 MG/1
50 TABLET, FILM COATED ORAL DAILY
Status: DISCONTINUED | OUTPATIENT
Start: 2022-03-01 | End: 2022-03-02 | Stop reason: SDUPTHER

## 2022-03-01 RX ADMIN — MILRINONE LACTATE IN DEXTROSE 0.38 MCG/KG/MIN: 200 INJECTION, SOLUTION INTRAVENOUS at 13:28

## 2022-03-01 RX ADMIN — RANOLAZINE 500 MG: 500 TABLET, FILM COATED, EXTENDED RELEASE ORAL at 22:41

## 2022-03-01 RX ADMIN — POTASSIUM CHLORIDE 40 MEQ: 750 TABLET, EXTENDED RELEASE ORAL at 17:41

## 2022-03-01 RX ADMIN — HYDRALAZINE HYDROCHLORIDE 50 MG: 50 TABLET, FILM COATED ORAL at 22:41

## 2022-03-01 RX ADMIN — HYDRALAZINE HYDROCHLORIDE 50 MG: 50 TABLET, FILM COATED ORAL at 17:41

## 2022-03-01 RX ADMIN — SODIUM CHLORIDE, PRESERVATIVE FREE 10 ML: 5 INJECTION INTRAVENOUS at 17:42

## 2022-03-01 RX ADMIN — BUMETANIDE 2 MG: 1 TABLET ORAL at 17:41

## 2022-03-01 RX ADMIN — SODIUM CHLORIDE, PRESERVATIVE FREE 10 ML: 5 INJECTION INTRAVENOUS at 22:42

## 2022-03-01 RX ADMIN — MILRINONE LACTATE IN DEXTROSE 0.38 MCG/KG/MIN: 200 INJECTION, SOLUTION INTRAVENOUS at 22:38

## 2022-03-01 RX ADMIN — ISOSORBIDE MONONITRATE 30 MG: 30 TABLET, EXTENDED RELEASE ORAL at 22:42

## 2022-03-01 RX ADMIN — ROSUVASTATIN 10 MG: 10 TABLET, FILM COATED ORAL at 22:42

## 2022-03-01 RX ADMIN — POLYETHYLENE GLYCOL 3350, SODIUM SULFATE ANHYDROUS, SODIUM BICARBONATE, SODIUM CHLORIDE, POTASSIUM CHLORIDE 2000 ML: 236; 22.74; 6.74; 5.86; 2.97 POWDER, FOR SOLUTION ORAL at 17:39

## 2022-03-01 NOTE — CONSULTS
[address] [facility] NOTE  [facility] office  [phone] NP in-hospital cell phone M-F until 4:30  After 5pm or on weekends, please call  for physician on call        NAME:  Marylou Sarabia   :   1945   MRN:   [id number]       Referring Physician: Abraham Perry    Consult Date: 3/1/2022 10:54 AM    Chief Complaint: need EGD/colonoscopy      History of Present Illness:  Patient is a 69 y.o. who is seen in consultation at the request of Abraham Perry NP for patient admitted to complete GI scopes. He has inotrope dependent heart failure, needs EGD/colonoscopy for LVAD evaluation. He reports mild intermittent chronic constipation, related to diet/activity. Denies other GI issues, no obvious GI blood loss. Colonoscopy 2016 Dr. Eduar Grewal: internal hemorrhoids, mild left sided diverticulosis, polyps    I have reviewed the emergency room note, hospital admission note, notes by all other clinicians who have seen the patient during this hospitalization to date. I have reviewed the problem list and the reason for this hospitalization. I have reviewed the allergies and the medications the patient was taking at home prior to this hospitalization. PMH:  Past Medical History:   Diagnosis Date    CAD (coronary artery disease)   '97, '13 x 2    MI, code ice in  at [facility]    Calculus of kidney     Colonic polyps     Congestive heart failure, unspecified     Diabetes ([facility] [address]     Gastritis     Hypercholesterolemia     Sleep apnea        PSH:  Past Surgical History:   Procedure Laterality Date    COLONOSCOPY  2011    16, due 21    ENDOSCOPY, COLON, DIAGNOSTIC      11, due 16    HX CORONARY ARTERY BYPASS GRAFT  8/22/12    x 4 vessel by MISSY LION    HX PACEMAKER PLACEMENT  13    [place] CARDIAC SURG PROCEDURE UNLIST  2012    x 4 vessels       Allergies:   Allergies Allergen Reactions    Oxycodone Anaphylaxis    Pcn [Penicillins] Hives       Home Medications:  Prior to Admission Medications   Prescriptions Last Dose Informant Patient Reported? Taking? OneTouch Delica Plus Lancet 33 gauge misc   No No   Sig: USE TO TEST BLOOD SUGAR DAILY   acetaminophen (TYLENOL EXTRA STRENGTH) 500 mg tablet   Yes No   Sig: Take  by mouth every six (6) hours as needed. aspirin delayed-release 81 mg tablet   Yes No   Sig: Take 81 mg by mouth daily. benzonatate (TESSALON) 200 mg capsule   Yes No   Sig: Take 200 mg by mouth three (3) times daily as needed for Cough. bumetanide (BUMEX) 2 mg tablet   No No   Sig: Take 1 Tablet by mouth two (2) times a day. Patient taking differently: Take 2 mg by mouth two (2) times a day. 2 mg in am 1mg in evening   dapagliflozin (Farxiga) 10 mg tab tablet   No No   Sig: Take 1 Tablet by mouth daily. diphenhydrAMINE (Benadryl Allergy) 25 mg tablet   Yes No   Sig: Take 25 mg by mouth every six (6) hours as needed. Needed every night d/t picc line causing itching   eplerenone (INSPRA) 50 mg tab tablet   No No   Sig: Take 1 Tablet by mouth daily. glucose blood VI test strips (OneTouch Ultra Test) strip   No No   Sig: USE TO TEST BLOOD SUGAR DAILY   hydrALAZINE (APRESOLINE) 50 mg tablet   No No   Sig: TAKE 1 TABLET BY MOUTH THREE TIMES DAILY   isosorbide mononitrate ER (IMDUR) 30 mg tablet   No No   Sig: Take 1 Tablet by mouth every twelve (12) hours. lancets misc   No No   Sig: Use to test blood sugar daily   metFORMIN (GLUCOPHAGE) 500 mg tablet   No No   Sig: Take 1 Tablet by mouth two (2) times daily (with meals). milrinone (PRIMACOR) 20 mg/100 mL (200 mcg/mL) infusion   No No   Si.688 mcg/min by IntraVENous route continuous. Patient taking differently: 0.375 mcg/kg/min by IntraVENous route continuous.  Dosed at 84.5 kg   omeprazole (PRILOSEC) 20 mg capsule   No No   Sig: TAKE 1 CAPSULE BY MOUTH DAILY FOR INDIGESTION   potassium chloride SR (K-TAB) 20 mEq tablet   No No   Sig: Take 2 Tablets by mouth two (2) times a day. Patient taking differently: Take 40 mEq by mouth two (2) times a day. Two tablets in am one tablet pm   prasugreL (Effient) 10 mg tablet   Yes No   Sig: Take 10 mg by mouth daily. ranolazine ER (Ranexa) 500 mg SR tablet   No No   Sig: Take 1 Tablet by mouth two (2) times a day. rosuvastatin (CRESTOR) 10 mg tablet   No No   Sig: TAKE 1 TABLET NIGHTLY   tamsulosin (FLOMAX) 0.4 mg capsule   No No   Sig: TAKE 1 CAPSULE DAILY      Facility-Administered Medications: None       Hospital Medications:  Current Facility-Administered Medications   Medication Dose Route Frequency    acetaminophen (TYLENOL) tablet 500 mg  500 mg Oral Q6H PRN    [START ON 3/2/2022] aspirin delayed-release tablet 81 mg  81 mg Oral DAILY    bumetanide (BUMEX) tablet 2 mg  2 mg Oral BID    eplerenone (INSPRA) tablet 50 mg  50 mg Oral DAILY    hydrALAZINE (APRESOLINE) tablet 50 mg  50 mg Oral TID    isosorbide mononitrate ER (IMDUR) tablet 30 mg  30 mg Oral Q12H    milrinone (PRIMACOR) 20 MG/100 ML D5W infusion  0.375 mcg/kg/min IntraVENous CONTINUOUS    [START ON 3/2/2022] pantoprazole (PROTONIX) tablet 20 mg  20 mg Oral ACB    potassium chloride SR (KLOR-CON 10) tablet 40 mEq  40 mEq Oral BID    [START ON 3/2/2022] tamsulosin (FLOMAX) capsule 0.4 mg  0.4 mg Oral DAILY    rosuvastatin (CRESTOR) tablet 10 mg  10 mg Oral QHS    ranolazine ER (RANEXA) tablet 500 mg  500 mg Oral Q12H       Social History:  Social History     Tobacco Use    Smoking status: Never Smoker    Smokeless tobacco: Never Used   Substance Use Topics    Alcohol use:  Yes     Alcohol/week: 0.0 standard drinks     Comment:  VERY RARE       Family History:  Family History   Problem Relation Age of Onset    Heart Disease Mother         MI    Heart Disease Father         CAD & PVD    Lung Disease Father     Cancer Father         lung    Diabetes Maternal Grandmother     Heart Disease Other         CAD    Stroke Sister        Review of Systems:  Constitutional: negative fever, negative chills, negative weight loss  Eyes:   negative visual changes  ENT:   negative sore throat, tongue or lip swelling  Respiratory:  negative cough, negative dyspnea  Cards:  negative for chest pain, palpitations, lower extremity edema  GI:   See HPI  :  negative for frequency, dysuria  Integument:  negative for rash and pruritus  Heme:  negative for easy bruising and gum/nose bleeding  Musculoskeletal:negative for myalgias, back pain and muscle weakness  Neuro:    negative for headaches, dizziness, vertigo  Psych: negative for feelings of anxiety, depression     Objective:   No data found. No intake/output data recorded. No intake/output data recorded. EXAM:     CONST:  Pleasant male sitting in bed, no acute distress   NEURO:  alert and oriented x 3   HEENT: EOMI, no scleral icterus   LUNGS: No increased WOB   CARD:   Regular rate   ABD:  soft, no tenderness, no rebound, no masses, non distended   EXT:  no edema, warm   PSYCH: full, not anxious     Data Review     No results for input(s): WBC, HGB, HCT, PLT, HGBEXT, HCTEXT, PLTEXT in the last 72 hours. No results for input(s): NA, K, CL, CO2, BUN, CREA, GLU, PHOS, CA in the last 72 hours. No results for input(s): AP, TBIL, TP, ALB, GLOB, GGT, AML, LPSE in the last 72 hours. No lab exists for component: SGOT, GPT, AMYP, HLPSE  No results for input(s): INR, PTP, APTT, INREXT in the last 72 hours.        Assessment:     · LVAD evaluation  · Cardiomyopathy: EF 20-25%, milrinone  · Elevated LFT's likely congestion from severe heart failure  · Effient on hold last dose 2/25/22  · Pancytopenia: hematology evaluted      Patient Active Problem List   Diagnosis Code    Calculus of kidney N20.0    Coronary atherosclerosis of native coronary artery I25.10    Benign neoplasm of colon D12.6    Unspecified sleep apnea G47.30    Reflux esophagitis K21.00    Encounter for long-term (current) use of other medications Z79.899    Insomnia, unspecified G47.00    Cardiac arrest ([facility] [address] I46.9    Hematuria, gross R31.0    BPH without obstruction/lower urinary tract symptoms N40.0    Proteinuria R80.9    CHF, stage C ([facility]) I50.9    Mixed hyperlipidemia E78.2    Type 2 diabetes, controlled, with peripheral circulatory disorder ([facility]) E11.51    Active advance directive on file Z78.9    Acute on chronic clinical systolic heart failure ([facility]) I50.23    NSTEMI (non-ST elevated myocardial infarction) ([facility]) I21.4    CHF (congestive heart failure) ([facility]) I50.9     Plan:       · CLD, NPO midnight   · Prep this evening  · Plan for EGD/colonoscopy tomorrow, needs negative COVID testing  · Patient discussed with Dr. Yancy Boggs  · Thank you for allowing me to participate in care of Bing Mohan8 By: Tiara Coleman NP     3/1/2022  10:54 AM

## 2022-03-01 NOTE — TELEPHONE ENCOUNTER
Called Patient Access. Patient has been assigned to bed 457. Called and spoke with patient's wife Kami Dye). Informed of bed assignment and requested patient report to Inpatient Admitting. Bryant Mean verbalized understanding.

## 2022-03-01 NOTE — PROGRESS NOTES
[address] [facility] in [facility],  E [facility]  Inpatient Progress Note      Patient name: Britton Gallegos  Patient : 1945  Patient MRN: [id number]  Consulting MD: Antionette Brito MD  Date of service: 22    REASON FOR REFERRAL:  Management of chronic systolic heart failure      PLAN OF CARE:  · 70 y/o male with h/o severe ischemic cardiomyopathy, LVEF 20-25%; stage D, NYHA class IV symptoms on chronic inotropic support with milrinone gtt; patient unerwent LVAD-DT evaluation which was placed on hold due to discovery of lung nodule posittive for adenocarcinoma which required radiation therapy and ICD explant/reimplant; patient did not complete evaluation (EGD/C-scope needed) as he has not been decided if he would like to proceed with LVAD. · Patient recently admitted for acute decompensation of heart failure after recent pneumonia treated with Z-pack underwent diuresis  · Patient is a direct admit today to complete his EGD and colonoscopy. GI has been consulted. Plan is for GI scopes to be done on 3/2. Patient is on clear liquids today. RECOMMENDATIONS/PLAN:  Continue current medical therapy for heart failure  Continue milrinone 0.375 mcg/kg/min current weight based  GI Consulted for EGD and colonoscopy plan  Intolerant Coreg due to RV dysfunction   ACEi/ARB/ARNi previously discontinued in anticipation of possible cardiac surgery  Continue current dose of eplerenone 50 mg daily  Continue Potassium 40 mEq po BID  Will restart Farxiga 10 mg daily OP - not on formuary  Continue current dose of Hydralazine 50 TID and Imdur 30 mg twice daily  Continue Bumex 2 mg po BID.  Goal weight < 190  Recent admission w/Low Iron, given Venofer 200 mg daily X 2 doses  Continue ASA 81 mg daily   Holding Effient 10 mg po daily  Continue current dose of rosuvastatin 10 mg daily  Continue ranolazine 500 mg BID (for HR and angina control)  Ordered CPAP therapy - uses most of the time at home  Advanced care plan forms completed /filed  Consulted Diabetes management; appreciate recs  Vit D level normal, patient does not need to continue Vitamin D supplements at home   Genetic test negative   Reinforced heart healthy, low salt diet. Currently on clear liquids for scopes tomorrow  Reinforced appropriate fluid intake of 6 x 8oz glasses of water per day  Monitor and record daily standing weights and strict I/O's  Follow-up with primary cardiologist Dr. Piyush Salomon per primary team    LIFE GOALS:  Patient's personal goals include: complete procedures for LVAD evaluation  Important upcoming milestones or family events: nothing at this time  The patient identifies the following as important for living well: be more active, do activities w/o SOB    IMPRESSION:  Fatigue at rest  Shortness of breath with exertion  Acute on chronic systolic heart failure  · Stage D, NYHA class IV symptoms improved to class II on inotropes  · Non-ischemic cardiomyopathy, LVEF 20% with LVEDD 6.2  · RV dysfunction, TAPSE 1.22 (prelimnary read)  · TBili 1.5 likely from cardiac congestion  At risk of sudden cardiac death  · Recent cardiac arrest   · S/p ICD (1/2013, [address] followed by [facility]); New implant on 10/21/2021 [employer]  Coronary artery disease  · S/p multiple interventions  · S/p 4V CABG (8/2012)  · LHC (7/2019) severe stenosis of LIMA to LAD anastomosis site, SVG graft to OM is occluded, SVG graft to RCA 40-50%, LAD occluded proximally, severe proximal LCx, RCA is the long . · PET (6/2019) EF 24% with anterior lateral and inferior reversible defect  Cardiac risk factors  · HTN  · HL  · DM2  · SRUTHI on CPAP  · MIld carotid stenosis  Anemia, microcytic  · Iron deficiency  DVT with filter  Pulmonary nodules   Dysphagia        CARDIAC IMAGING:  ECHO ( 2/2/22):    Left Ventricle: Left ventricle is moderately dilated. Mildly increased wall thickness. Severe global hypokinesis present.  Severely reduced left ventricular systolic function with a visually estimated EF of 15 - 20%. Grade II diastolic dysfunction with increased LAP.   Right Ventricle: Right ventricle is mildly dilated. Mildly reduced systolic function.   Aortic Valve: Mild sclerosis of the aortic valve cusps. Mild transvalvular regurgitation.   Mitral Valve: Mild transvalvular regurgitation.   Tricuspid Valve: Moderate transvalvular regurgitation. Mildly elevated RVSP. RVSP is 39 mmHg.   Left Atrium: Left atrium is moderately dilated.        Echo (11/23/21):  · LV 15-20%.    · Mod-severe, MR, mod-TR     Echo (5/20/21)  · LV: Estimated LVEF is 20 - 25%. Normal wall thickness. Mildly dilated left ventricle. Severely and globally reduced systolic function. Severe (grade 4) left ventricular diastolic dysfunction. · LA: Severely dilated left atrium. · RA: Severely dilated right atrium. · MV: Moderate mitral valve regurgitation is present. · TV: Moderate tricuspid valve regurgitation is present. · AO: Mild aortic root dilatation. · RV: Pacer/ICD present. · LVED 6.2cm     EKG (5/20/21) SB with 1degree AV block, QRS 116ms     LHC (5/24/21) Native Coronaries: LM - moderate to severe distal disease 50%. % prox occluded (), heavily calcified, LCx: 80% proximal stenosis. OM1 is  (seen filling faintly via collaterals). OM2 99% stenosis. RCA: 100% proximal occluded.  LIMA to LAD: patent, supplies only a diagonal branch (probably D2).  The LAD distal to the bifurcation is 100% occluded () and fills via right to left collaterals off the SVG to RCA. SVG to R-PDA: patent with moderate diffuse irregularities but no obstructive disease in the graft.    No appealing interventional targets.      NST as above     ICD Interrogation (11/12/2021) [employer] VVI, thoracic impedence trending up, no events, normal device function and good battery  ICD interrogation (5/12/21) [employer] single lead, no events, normal device function and good battery     HEMODYNAMICS:  RHC 5/24/21 CI 1.97, PA 33/9/17, RA 1, PCWP 6- off milrinone   CPEST not done     Patient underwent a 6 minute walk test 11/12/2021     6 Min Walk Report 11/12/2021 7/6/2021 5/20/2021   (PRE) HR 88 98 74   (PRE) O2 Sat 100 - 99   (POST)  - 81   (POST) O2 Sat 99 98% on Ra 99   Distance in Meters 386.58 - 1158.24          OTHER IMAGING:  CXR (1/24/22): FINDINGS: PA and lateral radiographs of the chest demonstrate a new infiltrate  in the apical segment of the left lower lobe. The cardiac and mediastinal  contours and pulmonary vascularity are stable.  The bones and soft tissues are  within normal limits. IMPRESSION: Left lower lobe pneumonia     CXR (6/17/21) clear     Duplex LE venous Bilal (2/2/2021)  Right Lower Venous  Varicose vein(s) present. Varicose veins are patent. The common femoral, great saphenous, profunda femoral, femoral, popliteal, gastrocnemius, posterior tibial, peroneal and saphenous femoral junction vein(s) were imaged in the transverse and longitudinal planes. The vessels showed normal color filling and compressibility. Doppler interrogation of the veins showed phasic and spontaneous flow.      Left Lower Venous  Varicose vein(s) present. Varicose veins are patent. The common femoral, great saphenous, saphenous femoral junction, profunda femoral, femoral, popliteal, gastrocnemius, posterior tibial and peroneal vein(s) were imaged in the transverse and longitudinal planes. The vessels showed normal color filling and compressibility. Doppler interrogation of the veins showed phasic and spontaneous flow.         CT chest (2/1/22)  1. No pulmonary emboli. 2. Pleural effusions. 3. Treated RAVEN carcinoma as discussed.     CT 5/21/21 1. Irregular pulmonary nodule in the left upper lobe measuring 2 cm, suspicious for primary pulmonary malignancy. 2. Additional 6 mm left upper lobe pulmonary nodule.  3. Severe calcific atherosclerosis of the coronary arteries and abdominal aorta. No aneurysm. 4. Cardiomegaly. 5. No acute abnormality within the chest, abdomen, or pelvis.       HPI:   Briefly, Liliana Saldivar is a 69 y.o. male with h/o HTN, HL, DM2, SRUTHI on CPAP, chronic systolic heart failure, stage D, NYHA class IV symptoms, non-ischemic cardiomyopathy, LVEF 20% with LVEDD 6.2, RV dysfunciton, TAPSE 1.22, TBili 1.5 likely from cardiac congestion, recent cardiac arrest s/p ICD (1/2013, [employer] followed by [facility]), coronary artery disease s/p multiple interventions s/p 4V CABG (8/2012), C (7/2019) severe stenosis of LIMA to LAD anastomosis site, SVG graft to OM is occluded, SVG graft to RCA 40-50%, LAD occluded proximally, severe proximal LCx, RCA is the long . PET (6/2019) EF 24% with anterior lateral and inferior reversible defect. Anemia, microcytic with iron deficiency, DVT with filter. Patient was referred to [facility] Clinic by Dr. Lala Shabazz for evaluation of his candidacy for advanced therapies.     Patient agreed to inpatient initiation of palliative infusion of chronic inotropes and evaluation for LVAD-DT. Patient was admitted 5/2-5/25/21. Patient was started on milrinone infusion and had first part of LVAD evaluation completed. Right and left heart cath on 5/24/21 that showed severe diffuse native vessel CAD, with patent grafts (LIMA to D2, SVG to RCA).  Antiplatelet therapy was held during hospitalization and after discharge due to anticipated pulmonary nodule biopsy, bone marrow biopsy and EGD/C-Scope as part of LVAD workup. Patient was readmitted 5/26-5/28/21 with hypertension, headache and chest pain, his troponin peaked at 10; he was shortly bridged with heparin, otherwise had normal EKG and chest CT negative for PE. Cardiology and AHF service was consulted and patient was discharged home after troponin came down.      Patient has now completed radiation treatment for spot in the lungs. Hem-onc has cleared patient.  Patient will follow up with his pulmonary MD in 1-2 months. He is agreeable to proceed/complete with LVAD evaluation. He was direct-admitted today 3/1/2022 to complete GI scopes inpatient. PHYSICAL EXAM:  Visit Vitals  /61 (BP 1 Location: Left upper arm, BP Patient Position: Sitting)   Pulse 78   Temp 97.5 °F (36.4 °C)   Resp 20   Ht 6' 1\" (1.854 m)   Wt 171 lb 1.2 oz (77.6 kg)   SpO2 100%   BMI 22.57 kg/m²     General: Patient is well developed, well-nourished in no acute distress, resting in bed  HEENT: Normocephalic and atraumatic. No scleral icterus. Pupils are equal, round and reactive to light and accomodation. No conjunctival injection. Oropharynx is clear. Neck: Supple. No evidence of thyroid enlargements or lymphadenopathy. JVD: Cannot be appreciated   Lungs: Breath sounds are equal and clear bilaterally. No wheezes, rhonchi, or rales. Heart: Regular rate and rhythm with normal S1 and S2. No murmurs, gallops or rubs. Abdomen: Soft, no mass or tenderness. No organomegaly or hernia. Bowel sounds present. Genitourinary and rectal: deferred  Extremities: No cyanosis, clubbing, or edema. Neurologic: No focal sensory or motor deficits are noted. Grossly intact. Psychiatric: Awake, alert an doriented x 3. Appropriate mood and affect. Skin: Warm, dry and well perfused. No lesions, nodules or rashes are noted. REVIEW OF SYSTEMS:  General: Denies fever, night sweats. Ear, nose and throat: Denies difficulty hearing, sinus problems, runny nose, post-nasal drip, ringing in ears, mouth sores, loose teeth, ear pain, nosebleeds, sore throate, facial pain or numbess  Cardiovascular: see above in the interval history  Respiratory: Denies cough, wheezing, sputum production, hemoptysis.  +SOB with activity  Gastrointestinal: Denies heartburn, constipation, intolerance to certain foods, diarrhea, abdominal pain, nausea, vomiting, difficulty swallowing, blood in stool  Kidney and bladder: Denies painful urination, frequent urination, urgency, prostate problems and impotence  Musculoskeletal: Denies joint pain, muscle weakness  Skin and hair: Denies change in existing skin lesions, hair loss or increase, breast changes    PAST MEDICAL HISTORY:  Past Medical History:   Diagnosis Date    CAD (coronary artery disease) '90 '97, '13 x 2    MI, code ice in 2013 at [facility]    Calculus of kidney     Colonic polyps     Congestive heart failure, unspecified     Diabetes ([facility] [address]     Gastritis     Hypercholesterolemia     Sleep apnea        PAST SURGICAL HISTORY:  Past Surgical History:   Procedure Laterality Date    COLONOSCOPY  04/06/2011    16, due 21    ENDOSCOPY, COLON, DIAGNOSTIC      11, due 16    HX CORONARY ARTERY BYPASS GRAFT  8/22/12    x 4 vessel by MISSY LION    HX PACEMAKER PLACEMENT  1/30/13    AL CARDIAC SURG PROCEDURE UNLIST  2012    x 4 vessels       FAMILY HISTORY:  Family History   Problem Relation Age of Onset    Heart Disease Mother         MI    Heart Disease Father         CAD & PVD    Lung Disease Father     Cancer Father         lung    Diabetes Maternal Grandmother     Heart Disease Other         CAD    Stroke Sister        SOCIAL HISTORY:  Social History     Socioeconomic History    Marital status:    Tobacco Use    Smoking status: Never Smoker    Smokeless tobacco: Never Used   Vaping Use    Vaping Use: Never used   Substance and Sexual Activity    Alcohol use: Yes     Alcohol/week: 0.0 standard drinks     Comment:  VERY RARE    Drug use: No       LABORATORY RESULTS:     Labs Latest Ref Rng & Units 3/1/2022 2/5/2022 2/4/2022 2/3/2022 2/2/2022 2/2/2022 2/1/2022   WBC 4.1 - 11.1 K/uL 3. 0(L) 3.4(L) 3. 3(L) 2. 6(L) - - 3. 0(L)   RBC 4.10 - 5.70 M/uL 5.04 4.48 4.42 4.38 - - 5.09   Hemoglobin 12.1 - 17.0 g/dL 11. 8(L) 10. 2(L) 10. 2(L) 10. 1(L) - - 11. 7(L)   Hematocrit 36.6 - 50.3 % 37.7 32. 4(L) 31. 9(L) 31. 4(L) - - 37.5   MCV 80.0 - 99.0 FL 74. 8(L) 72. 3(L) 72. 2(L) 71. 7(L) - - 73.7(L)   Platelets 608 - 778 K/uL 154 135(L) 125(L) 123(L) - - 144(L)   Lymphocytes 12 - 49 % 18 19 26 29 - - 21   Monocytes 5 - 13 % 14(H) 14(H) 15(H) 15(H) - - 11   Eosinophils 0 - 7 % 2 2 3 4 - - 1   Basophils 0 - 1 % 1 1 1 1 - - 1   Albumin 3.5 - 5.0 g/dL 3.6 3.3(L) 3. 2(L) 2. 9(L) - - 3.6   Calcium 8.5 - 10.1 MG/DL 9.4 9.4 8.8 8.6 9.1 8.8 9.6   Glucose 65 - 100 mg/dL 134(H) 46(LL) 66 101(H) 200(H) 213(H) 141(H)   BUN 6 - 20 MG/DL 21(H) 20 22(H) 20 21(H) 21(H) 22(H)   Creatinine 0.70 - 1.30 MG/DL 1.18 1.30 1.24 1.15 1.26 1.18 1.38(H)   Sodium 136 - 145 mmol/L 132(L) 136 137 138 136 137 136   Potassium 3.5 - 5.1 mmol/L 3.6 3.6 3.9 3.8 3.6 3. 2(L) 3.9   TSH 0.36 - 3.74 uIU/mL 1.68 - - - - - -   Some recent data might be hidden     Lab Results   Component Value Date/Time    TSH 1.68 03/01/2022 11:50 AM    TSH 1.47 05/26/2021 10:44 PM    TSH 1.97 05/20/2021 04:28 PM    TSH 1.41 02/09/2021 03:05 PM    TSH 1.740 08/14/2018 09:58 AM    TSH 1.710 10/18/2017 12:00 AM       ALLERGY:  Allergies   Allergen Reactions    Oxycodone Anaphylaxis    Pcn [Penicillins] Hives        CURRENT MEDICATIONS:    Current Facility-Administered Medications:     acetaminophen (TYLENOL) tablet 500 mg, 500 mg, Oral, Q6H PRN, Lidia Ped, NP  [facility]  [START ON 3/2/2022] aspirin delayed-release tablet 81 mg, 81 mg, Oral, DAILY, Ion Broderick NP    bumetanide (BUMEX) tablet 2 mg, 2 mg, Oral, BID, Ion Broderick NP    eplerenone (INSPRA) tablet 50 mg, 50 mg, Oral, DAILY, Ion Broderick NP    hydrALAZINE (APRESOLINE) tablet 50 mg, 50 mg, Oral, TID, Ion Broderick NP    isosorbide mononitrate ER (IMDUR) tablet 30 mg, 30 mg, Oral, Q12H, Ion Broderick NP    milrinone (PRIMACOR) 20 MG/100 ML D5W infusion, 0.375 mcg/kg/min, IntraVENous, CONTINUOUS, Ion Broderick NP, Last Rate: 8.6 mL/hr at 03/01/22 1328, 0.375 mcg/kg/min at 03/01/22 1328    [START ON 3/2/2022] pantoprazole (PROTONIX) tablet 20 mg, 20 mg, Oral, ACB, Jaida Broderick, NP    potassium chloride SR (KLOR-CON 10) tablet 40 mEq, 40 mEq, Oral, BID, Jaida Broderick NP  Kate Bejarano ON 3/2/2022] tamsulosin [employer]) capsule 0.4 mg, 0.4 mg, Oral, DAILY, Jaida Broderick NP    rosuvastatin (CRESTOR) tablet 10 mg, 10 mg, Oral, QHS, Jaida Broderick NP    ranolazine ER (RANEXA) tablet 500 mg, 500 mg, Oral, Q12H, Jaida Broderick NP    peg 3350-electrolytes (COLYTE) 4000 mL, 2,000 mL, Oral, BID, Gonzalo Lora NP    PATIENT CARE TEAM:  Patient Care Team:  Dina Ballesteros MD as PCP - General (Internal Medicine)  Dina Ballesteros MD as PCP - [facility]  Bereket Schmid MD as Physician (Cardiology)  Brian Degroot MD as Physician (Cardiology)  Cherelle Low MD as Physician (Gastroenterology)  Mal Steele MD as Physician (Orthopedic Surgery)  Arabella Huntley MD as Physician (Ophthalmology)  Adriane Carte Wilms, MD as Physician ([address])  Tano Simeon MD (Cardiology)  Chivo Causey MD (Cardiology)       Thank you for your referral and allowing me to participate in this patient's care. Maren Baugh DNP, [facility], PCCN, [address] [facility]  Heart Failure Nurse Practitioner   [facility]   [address] [facility] [place] [address] [place], [address]  W: [phone]/ F: [phone]      [facility] ATTENDING ADDENDUM    Patient was seen and examined in person. Data and notes were reviewed. I have discussed and agree with the plan as noted in the NP note above without further additions.     Dangelo Resendiz MD PhD  [facility] [place]

## 2022-03-01 NOTE — PROGRESS NOTES
Transitions of Care Plan   RUR: 13% - low   Admission Dx: GI Scopes; HF workup    Consults: GI   Baseline: independent; chronic inotrope; resides w wife   Barriers to Discharge: medical   Disposition: resumption of home health and home infusion   Estimated Discharge Date: 2+ days    Reason for Readmission:     EGD/Colonoscopy         RUR Score/Risk Level:     18% - moderate    PCP: First and Last name:  Puma Rivera MD   Name of Practice: [facility]   Are you a current patient: Yes/No:    Approximate date of last visit:    Can you participate in a virtual visit with your PCP:     Is a Care Conference indicated:  No    Did you attend your follow up appointment (s): If not, why not: Yes - attending [facility] appointment       Resources/supports as identified by patient/family:   Family support       Top Challenges facing patient (as identified by patient/family and CM): Medical complexity; pt undergoing LVAD workup/evaluation; chronic inotrope    Finances/Medication cost?     No concerns; [facility] Medicare  Transportation      Wife  Support system or lack thereof? Wife supportive    Living arrangements? Resides at address on file with wife  Self-care/ADLs/Cognition? Independent without DME; AOx4       Current Advanced Directive/Advance Care Plan: On file - wife is primary           Plan for utilizing home health:   Open with [facility] - agreeable to return with same agency             Transition of Care Plan:    Based on readmission, the patient's previous Plan of Care   has been evaluated and/or modified.  The current Transition of Care Plan is:           CM spoke with patient regarding patient's initial baseline and disposition plan:    Baseline Assessment:  ADLs: independent without DME  Self-Care: independent without DME  Social Background: resides at address on file with wife  Pharmacy: [facility] near residential address  Insurance: Confirmed  HF Patient for [employer] Health: Agreeable    Disposition Plan:  Home Health: [facility] - resumption of care - notified of admission  Home Infusion: [facility] - resumption of care - notified of admission    CM will continue to follow. Mae Woo, MPH  Care Manager [facility]  Available via [address] [place] [address] or      Care Management Interventions  PCP Verified by CM: Yes  Palliative Care Criteria Met (RRAT>21 & CHF Dx)?: No  Mode of Transport at Discharge:  Other (see comment) (Wife)  Transition of Care Consult (CM Consult): [address]: No  Reason Outside [place]: Patient already serviced by other home care/hospice agency  MyChart Signup: Yes  Discharge Durable Medical Equipment: No  Health Maintenance Reviewed: Yes  Physical Therapy Consult: No  Occupational Therapy Consult: No  Speech Therapy Consult: No  Support Systems: Spouse/Significant Other  Confirm Follow Up Transport: Family  The Plan for Transition of Care is Related to the Following Treatment Goals : Home Health  The Patient and/or Patient Representative was Provided with a Choice of Provider and Agrees with the Discharge Plan?: Yes  Name of the Patient Representative Who was Provided with a Choice of Provider and Agrees with the Discharge Plan: Vivek Armendariz, Sr.  Discharge Location  Patient Expects to be Discharged to[de-identified] Home with home health    Readmission Assessment  Number of days since last admission?: 8-30 days  Previous disposition: Home with Home Health  Who is being interviewed?: Patient  What was the patient's/caregiver's perception as to why they think they needed to return back to the hospital?: Other (Comment) (planned for colonoscopy)  Did you visit your Primary Care Physician after you left the hospital, before you returned this time?: No  Why weren't you able to visit your PCP?: Other (Comment) (went to advanced heart failure clinic)  Did you see a specialist, such as Cardiac, Pulmonary, Orthopedic Physician, etc. after you left the hospital?: Yes  Who advised the patient to return to the hospital?: Physician  Does the patient report anything that got in the way of taking their medications?: No  In our efforts to provide the best possible care to you and others like you, can you think of anything that we could have done to help you after you left the hospital the first time, so that you might not have needed to return so soon?: Other (Comment) (none)

## 2022-03-01 NOTE — DIABETES MGMT
[address] [facility]    CLINICAL NURSE SPECIALIST CONSULT     Initial Presentation   Joby Pink is a 69 y.o. male admitted via direct admit for EGD and colonoscopy in preparation for LVAD-DT- s/p treatment for lung adenocarcinoma with radiation-.s/p ICD implant 10/21- Noted recent hospitalization 2/1-2/5/22 at [facility] -diabetes management followed during that visit. HX:   Past Medical History:   Diagnosis Date    CAD (coronary artery disease) '90 '97, '13 x 2    MI, code ice in 2013 at [facility] [place]    Calculus of kidney     Colonic polyps     Congestive heart failure, unspecified     Diabetes ([facility] [address]     Gastritis     Hypercholesterolemia     Sleep apnea         INITIAL DX:   heart failure     Current Treatment     TX: HF management/ diabetes managment    Consulted by Provider for advanced diabetes nursing assessment and care for:   [x] Inpatient management strategy  [x] Home management assessment  Hospital Course   Clinical progress has been uncomplicated by thus far. Diabetes History   Type 2 Diabetes  Ambulatory BG management provided by: Juan Daniel Rivera MD PCP -recent visit noted 2/14/22-  Family history positive for diabetes: Maternal Grandmother with DM2    Diabetes-related Medical History  Acute complications  none  Neurological complications  Peripheral neuropathy  Microvascular disease  none  Macrovascular disease  CAD and Myocardial infarction  Other associated conditions     HF    Diabetes Medication History  Key Antihyperglycemic Medications             metFORMIN (GLUCOPHAGE) 500 mg tablet Take 1 Tablet by mouth two (2) times daily (with meals). dapagliflozin (Farxiga) 10 mg tab tablet Take 1 Tablet by mouth daily.            Diabetes self-management practices:   Eating pattern    - Breakfast Eggs, toast, oatmeal  - Lunch  Sometimes skips or piece of fruit  - Dinner  Chicken/steak with a sweet potato or bowl of soup  - Bedtime Fruit  - Snacks Fruit  - Beverages Water  Physical activity pattern-Limited given HF     Monitoring pattern-Checks daily now- -138    [x] Testing BGs sufficiently to inform self-management adjustments  Taking medications pattern  [x] Consistent administration  [x] Affordable  Social determinants of health impacting diabetes self-management practices   Concerned that you need to know more about how to stay healthy with diabetes  Overall evaluation:    [x] Achieving A1c target with drug therapy & self-care practices    Subjective   I've been checked out by everyone at this point\"      Admitted for LVAD-DT preparation  For EGD/colonoscopy in am    Milrinone infusion via PICC since April 2021 at home while waiting for LVAD (may be done early April 2022)  Objective   Physical exam  General Normal weight  female in no acute distress. Conversant and cooperative  Neuro  Alert, oriented   Vital Signs   Visit Vitals  /61 (BP 1 Location: Left upper arm, BP Patient Position: Sitting)   Pulse 78   Temp 97.5 °F (36.4 °C)   Resp 20   Ht 6' 1\" (1.854 m)   Wt 77.6 kg (171 lb 1.2 oz)   SpO2 100%   BMI 22.57 kg/m²     Skin  Warm and dry. Acanthosis noted along neckline. No lipohypertrophy or lipoatrophy noted at injection sites   Heart   Regular rate and rhythm. No murmurs, rubs or gallops  Lungs  Clear to auscultation without rales or rhonchi  Extremities No foot wounds    Diabetic foot exam:    Left Foot     Visual Exam: normal    Pulse DP: 2+ (normal)   Filament test: reduced sensation      Right Foot   Visual Exam: normal    Pulse DP: 2+ (normal)   Filament test: reduced sensation     DP & PT pulses +2.      Laboratory  Recent Labs     03/01/22  1153 03/01/22  1151 03/01/22  1150   *  --   --    AGAP 7  --   --    TRIGL  --   --  69   WBC  --  3.0*  --    CREA 1.18  --   --    GFRNA >60  --   --    AST 60*  --   --    ALT 15  --   --        Factors impacting BG management  Factor Dose Comments Nutrition:  Clears;        Drugs:  Blood transfusions    Other: milrinone infusion   NONE in past 3mo      Pain none    Infection none    Other:   Kidney function  Liver function     WNL  AST 60      Blood glucose pattern  POC check at admission 148    Significant diabetes-related events over the past 24-72 hours      Assessment and Plan   Nursing Diagnosis Risk for unstable blood glucose pattern   Nursing Intervention Domain 5250 Decision-making Support   Nursing Interventions Examined current inpatient diabetes/blood glucose control   Explored factors facilitating and impeding inpatient management  Explored corrective strategies with patient and responsible inpatient provider   Informed patient of rational for insulin strategy while hospitalized     Nursing Diagnosis 82722 Ineffective Health Management   Nursing Intervention Domain 5250 Decision-makingSupport   Nursing Interventions Identified diabetes self-management practices impeding diabetes control  Discussed diabetes survival skills related to  1. Healthy Plate eating plan; given handouts  2. Role of physical activity in improving insulin sensitivity and action  3. Procedure for blood glucose monitoring & options for low-cost products available from [employer]   4. Medications plan at discharge     Evaluation   This is an AA male who has severe HF awaiting LVAD-DT placement- admitted for EGD/colonoscopy work up in preparation. A1C 6.8% currently. Check BG each AM-compliant with diabetes medications. BG since admission 148. Anticipate he may require correctional insulin if BG trends >200-     Recommendations   1. [x] Use of Subcutaneous Insulin Order set (0127)  Insulin Dosing Specific recommendation   START Corrective                                       (Useful in adjusting insulin dosing) [x] Normal sensitivity  []      2. Once taking PO food diet-60gm carb consistent    3. [address] [facility]    Billing Code(s)   69584    Before making these care recommendations, I personally reviewed the hospitalization record, including notes, laboratory & diagnostic data and current medications, and examined the patient at the bedside (circumstances permitting) before making care recommendations. More than fifty (50) percent of the time was spent in patient counseling and/or care coordination.   Total minutes: 509 DEV Rodriguez, CNS  Diabetes Clinical Nurse Specialist  [facility]  Access via [address] [place] [address]

## 2022-03-02 ENCOUNTER — APPOINTMENT (OUTPATIENT)
Dept: NON INVASIVE DIAGNOSTICS | Age: 77
End: 2022-03-02
Attending: NURSE PRACTITIONER
Payer: MEDICARE

## 2022-03-02 ENCOUNTER — ANESTHESIA (OUTPATIENT)
Dept: ENDOSCOPY | Age: 77
End: 2022-03-02
Payer: MEDICARE

## 2022-03-02 ENCOUNTER — ANESTHESIA EVENT (OUTPATIENT)
Dept: ENDOSCOPY | Age: 77
End: 2022-03-02
Payer: MEDICARE

## 2022-03-02 DIAGNOSIS — N40.0 BENIGN PROSTATIC HYPERPLASIA WITHOUT LOWER URINARY TRACT SYMPTOMS: ICD-10-CM

## 2022-03-02 LAB
ALBUMIN SERPL-MCNC: 3 G/DL (ref 3.5–5)
ALBUMIN/GLOB SERPL: 0.8 {RATIO} (ref 1.1–2.2)
ALP SERPL-CCNC: 161 U/L (ref 45–117)
ALT SERPL-CCNC: 14 U/L (ref 12–78)
ANION GAP SERPL CALC-SCNC: 4 MMOL/L (ref 5–15)
AST SERPL-CCNC: 50 U/L (ref 15–37)
ATRIAL RATE: 75 BPM
BASOPHILS # BLD: 0 K/UL (ref 0–0.1)
BASOPHILS NFR BLD: 1 % (ref 0–1)
BILIRUB SERPL-MCNC: 0.7 MG/DL (ref 0.2–1)
BNP SERPL-MCNC: 3442 PG/ML
BUN SERPL-MCNC: 16 MG/DL (ref 6–20)
BUN/CREAT SERPL: 16 (ref 12–20)
CALCIUM SERPL-MCNC: 9 MG/DL (ref 8.5–10.1)
CALCULATED P AXIS, ECG09: 59 DEGREES
CALCULATED R AXIS, ECG10: 78 DEGREES
CALCULATED T AXIS, ECG11: 97 DEGREES
CHLORIDE SERPL-SCNC: 99 MMOL/L (ref 97–108)
CO2 SERPL-SCNC: 29 MMOL/L (ref 21–32)
CREAT SERPL-MCNC: 1.03 MG/DL (ref 0.7–1.3)
DIAGNOSIS, 93000: NORMAL
DIFFERENTIAL METHOD BLD: ABNORMAL
ECHO AO ROOT DIAM: 4 CM
ECHO AO ROOT INDEX: 2.02 CM/M2
ECHO LA DIAMETER INDEX: 2.22 CM/M2
ECHO LA DIAMETER: 4.4 CM
ECHO LA TO AORTIC ROOT RATIO: 1.1
ECHO LV EDV A2C: 202 ML
ECHO LV EDV A4C: 233 ML
ECHO LV EDV BP: 226 ML (ref 67–155)
ECHO LV EDV INDEX A4C: 118 ML/M2
ECHO LV EDV INDEX BP: 114 ML/M2
ECHO LV EDV NDEX A2C: 102 ML/M2
ECHO LV EJECTION FRACTION A2C: 20 %
ECHO LV EJECTION FRACTION A4C: 28 %
ECHO LV EJECTION FRACTION BIPLANE: 25 % (ref 55–100)
ECHO LV EJECTION FRACTION BIPLANE: 25 % (ref 55–100)
ECHO LV ESV A2C: 162 ML
ECHO LV ESV A4C: 169 ML
ECHO LV ESV BP: 170 ML (ref 22–58)
ECHO LV ESV INDEX A2C: 82 ML/M2
ECHO LV ESV INDEX A4C: 85 ML/M2
ECHO LV ESV INDEX BP: 86 ML/M2
ECHO LV FRACTIONAL SHORTENING: 8 % (ref 28–44)
ECHO LV INTERNAL DIMENSION DIASTOLE INDEX: 3.08 CM/M2
ECHO LV INTERNAL DIMENSION DIASTOLIC: 6.1 CM (ref 4.2–5.9)
ECHO LV INTERNAL DIMENSION SYSTOLIC INDEX: 2.83 CM/M2
ECHO LV INTERNAL DIMENSION SYSTOLIC: 5.6 CM
ECHO LV IVSD: 1.2 CM (ref 0.6–1)
ECHO LV MASS 2D: 322.7 G (ref 88–224)
ECHO LV MASS INDEX 2D: 163 G/M2 (ref 49–115)
ECHO LV POSTERIOR WALL DIASTOLIC: 1.2 CM (ref 0.6–1)
ECHO LV RELATIVE WALL THICKNESS RATIO: 0.39
ECHO TV REGURGITANT MAX VELOCITY: 2.45 M/S
ECHO TV REGURGITANT PEAK GRADIENT: 24 MMHG
EOSINOPHIL # BLD: 0.1 K/UL (ref 0–0.4)
EOSINOPHIL NFR BLD: 3 % (ref 0–7)
ERYTHROCYTE [DISTWIDTH] IN BLOOD BY AUTOMATED COUNT: 23.8 % (ref 11.5–14.5)
EST. AVERAGE GLUCOSE BLD GHB EST-MCNC: 146 MG/DL
FERRITIN SERPL-MCNC: 108 NG/ML (ref 26–388)
GLOBULIN SER CALC-MCNC: 3.8 G/DL (ref 2–4)
GLUCOSE BLD STRIP.AUTO-MCNC: 105 MG/DL (ref 65–117)
GLUCOSE BLD STRIP.AUTO-MCNC: 111 MG/DL (ref 65–117)
GLUCOSE BLD STRIP.AUTO-MCNC: 112 MG/DL (ref 65–117)
GLUCOSE BLD STRIP.AUTO-MCNC: 121 MG/DL (ref 65–117)
GLUCOSE BLD STRIP.AUTO-MCNC: 131 MG/DL (ref 65–117)
GLUCOSE SERPL-MCNC: 94 MG/DL (ref 65–100)
HBA1C MFR BLD: 6.7 % (ref 4–5.6)
HCT VFR BLD AUTO: 37.2 % (ref 36.6–50.3)
HGB BLD-MCNC: 11.7 G/DL (ref 12.1–17)
IMM GRANULOCYTES # BLD AUTO: 0 K/UL (ref 0–0.04)
IMM GRANULOCYTES NFR BLD AUTO: 0 % (ref 0–0.5)
IRON SATN MFR SERPL: 11 % (ref 20–50)
IRON SERPL-MCNC: 32 UG/DL (ref 35–150)
LDH SERPL L TO P-CCNC: 145 U/L (ref 85–241)
LYMPHOCYTES # BLD: 0.8 K/UL (ref 0.8–3.5)
LYMPHOCYTES NFR BLD: 26 % (ref 12–49)
MAGNESIUM SERPL-MCNC: 2.1 MG/DL (ref 1.6–2.4)
MCH RBC QN AUTO: 23.6 PG (ref 26–34)
MCHC RBC AUTO-ENTMCNC: 31.5 G/DL (ref 30–36.5)
MCV RBC AUTO: 75.2 FL (ref 80–99)
MONOCYTES # BLD: 0.3 K/UL (ref 0–1)
MONOCYTES NFR BLD: 12 % (ref 5–13)
NEUTS SEG # BLD: 1.7 K/UL (ref 1.8–8)
NEUTS SEG NFR BLD: 58 % (ref 32–75)
NRBC # BLD: 0 K/UL (ref 0–0.01)
NRBC BLD-RTO: 0 PER 100 WBC
P-R INTERVAL, ECG05: 248 MS
PLATELET # BLD AUTO: 140 K/UL (ref 150–400)
PMV BLD AUTO: ABNORMAL FL (ref 8.9–12.9)
POTASSIUM SERPL-SCNC: 3.6 MMOL/L (ref 3.5–5.1)
PROT SERPL-MCNC: 6.8 G/DL (ref 6.4–8.2)
Q-T INTERVAL, ECG07: 448 MS
QRS DURATION, ECG06: 118 MS
QTC CALCULATION (BEZET), ECG08: 500 MS
RBC # BLD AUTO: 4.95 M/UL (ref 4.1–5.7)
RBC MORPH BLD: ABNORMAL
SERVICE CMNT-IMP: ABNORMAL
SERVICE CMNT-IMP: ABNORMAL
SERVICE CMNT-IMP: NORMAL
SODIUM SERPL-SCNC: 132 MMOL/L (ref 136–145)
TIBC SERPL-MCNC: 294 UG/DL (ref 250–450)
VENTRICULAR RATE, ECG03: 75 BPM
WBC # BLD AUTO: 2.9 K/UL (ref 4.1–11.1)

## 2022-03-02 PROCEDURE — 93308 TTE F-UP OR LMTD: CPT

## 2022-03-02 PROCEDURE — 82728 ASSAY OF FERRITIN: CPT

## 2022-03-02 PROCEDURE — 83036 HEMOGLOBIN GLYCOSYLATED A1C: CPT

## 2022-03-02 PROCEDURE — 83880 ASSAY OF NATRIURETIC PEPTIDE: CPT

## 2022-03-02 PROCEDURE — 74011250636 HC RX REV CODE- 250/636: Performed by: INTERNAL MEDICINE

## 2022-03-02 PROCEDURE — 74011250637 HC RX REV CODE- 250/637: Performed by: INTERNAL MEDICINE

## 2022-03-02 PROCEDURE — 74011000250 HC RX REV CODE- 250: Performed by: FAMILY MEDICINE

## 2022-03-02 PROCEDURE — 85025 COMPLETE CBC W/AUTO DIFF WBC: CPT

## 2022-03-02 PROCEDURE — 74011250636 HC RX REV CODE- 250/636: Performed by: NURSE PRACTITIONER

## 2022-03-02 PROCEDURE — 74011250636 HC RX REV CODE- 250/636: Performed by: NURSE ANESTHETIST, CERTIFIED REGISTERED

## 2022-03-02 PROCEDURE — G0378 HOSPITAL OBSERVATION PER HR: HCPCS

## 2022-03-02 PROCEDURE — 88305 TISSUE EXAM BY PATHOLOGIST: CPT

## 2022-03-02 PROCEDURE — 83735 ASSAY OF MAGNESIUM: CPT

## 2022-03-02 PROCEDURE — 83540 ASSAY OF IRON: CPT

## 2022-03-02 PROCEDURE — 82962 GLUCOSE BLOOD TEST: CPT

## 2022-03-02 PROCEDURE — 96375 TX/PRO/DX INJ NEW DRUG ADDON: CPT

## 2022-03-02 PROCEDURE — 74011000258 HC RX REV CODE- 258: Performed by: NURSE PRACTITIONER

## 2022-03-02 PROCEDURE — 74011000250 HC RX REV CODE- 250: Performed by: NURSE ANESTHETIST, CERTIFIED REGISTERED

## 2022-03-02 PROCEDURE — 76040000007: Performed by: INTERNAL MEDICINE

## 2022-03-02 PROCEDURE — 74011250637 HC RX REV CODE- 250/637: Performed by: NURSE PRACTITIONER

## 2022-03-02 PROCEDURE — APPSS45 APP SPLIT SHARED TIME 31-45 MINUTES: Performed by: NURSE PRACTITIONER

## 2022-03-02 PROCEDURE — 65270000029 HC RM PRIVATE

## 2022-03-02 PROCEDURE — 77030021593 HC FCPS BIOP ENDOSC BSC -A: Performed by: INTERNAL MEDICINE

## 2022-03-02 PROCEDURE — 2709999900 HC NON-CHARGEABLE SUPPLY: Performed by: INTERNAL MEDICINE

## 2022-03-02 PROCEDURE — 77030029384 HC SNR POLYP CAPTVR II BSC -B: Performed by: INTERNAL MEDICINE

## 2022-03-02 PROCEDURE — 99214 OFFICE O/P EST MOD 30 MIN: CPT | Performed by: INTERNAL MEDICINE

## 2022-03-02 PROCEDURE — 80053 COMPREHEN METABOLIC PANEL: CPT

## 2022-03-02 PROCEDURE — 74011000250 HC RX REV CODE- 250: Performed by: NURSE PRACTITIONER

## 2022-03-02 PROCEDURE — 65660000000 HC RM CCU STEPDOWN

## 2022-03-02 PROCEDURE — 94760 N-INVAS EAR/PLS OXIMETRY 1: CPT

## 2022-03-02 PROCEDURE — 76060000032 HC ANESTHESIA 0.5 TO 1 HR: Performed by: INTERNAL MEDICINE

## 2022-03-02 PROCEDURE — P9047 ALBUMIN (HUMAN), 25%, 50ML: HCPCS | Performed by: NURSE PRACTITIONER

## 2022-03-02 PROCEDURE — 83615 LACTATE (LD) (LDH) ENZYME: CPT

## 2022-03-02 PROCEDURE — 36415 COLL VENOUS BLD VENIPUNCTURE: CPT

## 2022-03-02 RX ORDER — EPHEDRINE SULFATE/0.9% NACL/PF 50 MG/5 ML
SYRINGE (ML) INTRAVENOUS AS NEEDED
Status: DISCONTINUED | OUTPATIENT
Start: 2022-03-02 | End: 2022-03-02 | Stop reason: HOSPADM

## 2022-03-02 RX ORDER — ALBUMIN HUMAN 250 G/1000ML
12.5 SOLUTION INTRAVENOUS ONCE
Status: COMPLETED | OUTPATIENT
Start: 2022-03-02 | End: 2022-03-02

## 2022-03-02 RX ORDER — SODIUM CHLORIDE 0.9 % (FLUSH) 0.9 %
5-40 SYRINGE (ML) INJECTION AS NEEDED
Status: DISCONTINUED | OUTPATIENT
Start: 2022-03-02 | End: 2022-03-04 | Stop reason: HOSPADM

## 2022-03-02 RX ORDER — PROPOFOL 10 MG/ML
INJECTION, EMULSION INTRAVENOUS AS NEEDED
Status: DISCONTINUED | OUTPATIENT
Start: 2022-03-02 | End: 2022-03-02 | Stop reason: HOSPADM

## 2022-03-02 RX ORDER — EPINEPHRINE 0.1 MG/ML
1 INJECTION INTRACARDIAC; INTRAVENOUS
Status: DISCONTINUED | OUTPATIENT
Start: 2022-03-02 | End: 2022-03-02 | Stop reason: HOSPADM

## 2022-03-02 RX ORDER — EPLERENONE 50 MG/1
50 TABLET, FILM COATED ORAL DAILY
Status: DISCONTINUED | OUTPATIENT
Start: 2022-03-03 | End: 2022-03-04 | Stop reason: HOSPADM

## 2022-03-02 RX ORDER — NALOXONE HYDROCHLORIDE 0.4 MG/ML
0.4 INJECTION, SOLUTION INTRAMUSCULAR; INTRAVENOUS; SUBCUTANEOUS
Status: DISCONTINUED | OUTPATIENT
Start: 2022-03-02 | End: 2022-03-02 | Stop reason: HOSPADM

## 2022-03-02 RX ORDER — TAMSULOSIN HYDROCHLORIDE 0.4 MG/1
CAPSULE ORAL
Qty: 90 CAPSULE | Refills: 3 | Status: SHIPPED | OUTPATIENT
Start: 2022-03-02 | End: 2022-05-06

## 2022-03-02 RX ORDER — MIDAZOLAM HYDROCHLORIDE 1 MG/ML
.25-5 INJECTION, SOLUTION INTRAMUSCULAR; INTRAVENOUS
Status: DISCONTINUED | OUTPATIENT
Start: 2022-03-02 | End: 2022-03-02 | Stop reason: HOSPADM

## 2022-03-02 RX ORDER — SODIUM CHLORIDE 9 MG/ML
75 INJECTION, SOLUTION INTRAVENOUS CONTINUOUS
Status: DISPENSED | OUTPATIENT
Start: 2022-03-02 | End: 2022-03-02

## 2022-03-02 RX ORDER — ATROPINE SULFATE 0.1 MG/ML
0.5 INJECTION INTRAVENOUS
Status: DISCONTINUED | OUTPATIENT
Start: 2022-03-02 | End: 2022-03-02 | Stop reason: HOSPADM

## 2022-03-02 RX ORDER — EPHEDRINE SULFATE/0.9% NACL/PF 50 MG/5 ML
SYRINGE (ML) INTRAVENOUS
Status: COMPLETED
Start: 2022-03-02 | End: 2022-03-02

## 2022-03-02 RX ORDER — FENTANYL CITRATE 50 UG/ML
12.5-2 INJECTION, SOLUTION INTRAMUSCULAR; INTRAVENOUS
Status: DISCONTINUED | OUTPATIENT
Start: 2022-03-02 | End: 2022-03-02 | Stop reason: HOSPADM

## 2022-03-02 RX ORDER — FLUMAZENIL 0.1 MG/ML
0.2 INJECTION INTRAVENOUS
Status: DISCONTINUED | OUTPATIENT
Start: 2022-03-02 | End: 2022-03-02 | Stop reason: HOSPADM

## 2022-03-02 RX ORDER — SODIUM CHLORIDE 0.9 % (FLUSH) 0.9 %
5-40 SYRINGE (ML) INJECTION EVERY 8 HOURS
Status: DISCONTINUED | OUTPATIENT
Start: 2022-03-02 | End: 2022-03-04 | Stop reason: HOSPADM

## 2022-03-02 RX ORDER — PRASUGREL 10 MG/1
10 TABLET, FILM COATED ORAL DAILY
Status: DISCONTINUED | OUTPATIENT
Start: 2022-03-03 | End: 2022-03-03

## 2022-03-02 RX ORDER — DEXTROMETHORPHAN/PSEUDOEPHED 2.5-7.5/.8
1.2 DROPS ORAL
Status: DISCONTINUED | OUTPATIENT
Start: 2022-03-02 | End: 2022-03-02 | Stop reason: HOSPADM

## 2022-03-02 RX ORDER — SODIUM CHLORIDE 9 MG/ML
INJECTION, SOLUTION INTRAVENOUS
Status: DISCONTINUED | OUTPATIENT
Start: 2022-03-02 | End: 2022-03-02 | Stop reason: HOSPADM

## 2022-03-02 RX ADMIN — PROPOFOL 50 MG: 10 INJECTION, EMULSION INTRAVENOUS at 13:56

## 2022-03-02 RX ADMIN — POLYETHYLENE GLYCOL 3350, SODIUM SULFATE ANHYDROUS, SODIUM BICARBONATE, SODIUM CHLORIDE, POTASSIUM CHLORIDE 2000 ML: 236; 22.74; 6.74; 5.86; 2.97 POWDER, FOR SOLUTION ORAL at 05:41

## 2022-03-02 RX ADMIN — SODIUM CHLORIDE, PRESERVATIVE FREE 10 ML: 5 INJECTION INTRAVENOUS at 05:41

## 2022-03-02 RX ADMIN — SODIUM CHLORIDE: 900 INJECTION, SOLUTION INTRAVENOUS at 13:47

## 2022-03-02 RX ADMIN — POTASSIUM CHLORIDE 40 MEQ: 750 TABLET, EXTENDED RELEASE ORAL at 17:43

## 2022-03-02 RX ADMIN — MILRINONE LACTATE IN DEXTROSE 0.38 MCG/KG/MIN: 200 INJECTION, SOLUTION INTRAVENOUS at 23:37

## 2022-03-02 RX ADMIN — ASPIRIN 81 MG: 81 TABLET, COATED ORAL at 17:43

## 2022-03-02 RX ADMIN — TAMSULOSIN HYDROCHLORIDE 0.4 MG: 0.4 CAPSULE ORAL at 10:35

## 2022-03-02 RX ADMIN — ISOSORBIDE MONONITRATE 30 MG: 30 TABLET, EXTENDED RELEASE ORAL at 10:35

## 2022-03-02 RX ADMIN — SODIUM CHLORIDE, PRESERVATIVE FREE 10 ML: 5 INJECTION INTRAVENOUS at 20:23

## 2022-03-02 RX ADMIN — PROPOFOL 20 MG: 10 INJECTION, EMULSION INTRAVENOUS at 14:15

## 2022-03-02 RX ADMIN — ALBUMIN (HUMAN) 12.5 G: 0.25 INJECTION, SOLUTION INTRAVENOUS at 10:39

## 2022-03-02 RX ADMIN — PROPOFOL 50 MG: 10 INJECTION, EMULSION INTRAVENOUS at 14:04

## 2022-03-02 RX ADMIN — ROSUVASTATIN 10 MG: 10 TABLET, FILM COATED ORAL at 23:36

## 2022-03-02 RX ADMIN — PROPOFOL 30 MG: 10 INJECTION, EMULSION INTRAVENOUS at 14:10

## 2022-03-02 RX ADMIN — IRON SUCROSE 200 MG: 20 INJECTION, SOLUTION INTRAVENOUS at 20:39

## 2022-03-02 RX ADMIN — PROPOFOL 30 MG: 10 INJECTION, EMULSION INTRAVENOUS at 13:50

## 2022-03-02 RX ADMIN — HYDRALAZINE HYDROCHLORIDE 50 MG: 50 TABLET, FILM COATED ORAL at 17:43

## 2022-03-02 RX ADMIN — PROPOFOL 20 MG: 10 INJECTION, EMULSION INTRAVENOUS at 13:51

## 2022-03-02 RX ADMIN — MILRINONE LACTATE IN DEXTROSE 0.38 MCG/KG/MIN: 200 INJECTION, SOLUTION INTRAVENOUS at 11:00

## 2022-03-02 RX ADMIN — HYDRALAZINE HYDROCHLORIDE 50 MG: 50 TABLET, FILM COATED ORAL at 23:35

## 2022-03-02 RX ADMIN — ISOSORBIDE MONONITRATE 30 MG: 30 TABLET, EXTENDED RELEASE ORAL at 23:36

## 2022-03-02 RX ADMIN — Medication 5 MG: at 14:08

## 2022-03-02 RX ADMIN — PROPOFOL 50 MG: 10 INJECTION, EMULSION INTRAVENOUS at 14:00

## 2022-03-02 RX ADMIN — BUMETANIDE 2 MG: 1 TABLET ORAL at 17:43

## 2022-03-02 RX ADMIN — PANTOPRAZOLE SODIUM 20 MG: 20 TABLET, DELAYED RELEASE ORAL at 07:19

## 2022-03-02 RX ADMIN — RANOLAZINE 500 MG: 500 TABLET, FILM COATED, EXTENDED RELEASE ORAL at 10:46

## 2022-03-02 RX ADMIN — SODIUM CHLORIDE, PRESERVATIVE FREE 10 ML: 5 INJECTION INTRAVENOUS at 23:38

## 2022-03-02 RX ADMIN — RANOLAZINE 500 MG: 500 TABLET, FILM COATED, EXTENDED RELEASE ORAL at 23:36

## 2022-03-02 RX ADMIN — HYDRALAZINE HYDROCHLORIDE 50 MG: 50 TABLET, FILM COATED ORAL at 10:35

## 2022-03-02 NOTE — ANESTHESIA POSTPROCEDURE EVALUATION
Procedure(s):  ESOPHAGOGASTRODUODENOSCOPY (EGD)  COLONOSCOPY    :-  ESOPHAGOGASTRODUODENAL (EGD) BIOPSY  ENDOSCOPIC POLYPECTOMY. No value filed. Anesthesia Post Evaluation        Patient location during evaluation: PACU  Note status: adequate. Level of consciousness: awake  Pain management: satisfactory to patient  Airway patency: patent  Anesthetic complications: no  Cardiovascular status: acceptable  Respiratory status: acceptable  Hydration status: acceptable  Post anesthesia nausea and vomiting:  controlled      INITIAL Post-op Vital signs:   Vitals Value Taken Time   /64 03/02/22 1445   Temp 36.6 °C (97.8 °F) 03/02/22 1425   Pulse 66 03/02/22 1451   Resp 21 03/02/22 1451   SpO2 99 % 03/02/22 1451   Vitals shown include unvalidated device data.

## 2022-03-02 NOTE — PROGRESS NOTES
[facility] in [place], [place]  Inpatient Progress Note      Patient name: Rosa Monique  Patient : 1945  Patient MRN: [id number]  Consulting MD: Paula Villareal MD  Date of service: 22    REASON FOR REFERRAL:  Management of chronic systolic heart failure      PLAN OF CARE:  · 68 y/o male with h/o severe ischemic cardiomyopathy, LVEF 20-25%; stage D, NYHA class IV symptoms on chronic inotropic support with milrinone gtt; patient unerwent LVAD-DT evaluation which was placed on hold due to discovery of lung nodule posittive for adenocarcinoma which required radiation therapy and ICD explant/reimplant; patient did not complete evaluation (EGD/C-scope needed) as he has not been decided if he would like to proceed with LVAD. · Patient recently admitted for acute decompensation of heart failure after recent pneumonia treated with Z-pack underwent diuresis. · Patient was a direct admit 3/1/2022 afternoon to complete his EGD and colonoscopy. GI was consulted. Plan is for GI scopes to be done this afternoon. RECOMMENDATIONS/PLAN:  Continue current medical therapy for heart failure  Continue milrinone 0.375 mcg/kg/min current weight based  GI Consulted for EGD and colonoscopy plan - patient NPO, GI Scopes planned for today 3/2  Intolerant Coreg due to RV dysfunction   ACEi/ARB/ARNi previously discontinued in anticipation of possible cardiac surgery  Continue current dose of eplerenone 50 mg daily  Continue Potassium 40 mEq po BID  Will restart Farxiga 10 mg daily OP - not on formuary  Continue current dose of Hydralazine 50 TID and Imdur 30 mg twice daily  Continue Bumex 2 mg po BID.  Goal weight < 190  Recent admission w/Low Iron, given Venofer 200 mg daily X 2 doses  Patinet still with low Iron, will repeat Venofer 200 mg daily X 2 days  Patient with low albumin @3, ordered albumin IV daily; continue to monitor level  Continue ASA 81 mg daily   Holding Effient 10 mg po daily for procedure. Deferred to GI on restart date. Continue current dose of rosuvastatin 10 mg daily  Continue ranolazine 500 mg BID (for HR and angina control)  Ordered CPAP therapy - stated uses most of the time at home; patient declined to use in the hospital  Advanced care plan forms completed /filed  Order LDH = 145  Consulted Diabetes management; appreciate recs  Vit D level normal, patient does not need to continue Vitamin D supplements at home   Genetic test negative   LVAD Coordinator aware parts of the LVAD evaluation will need be reorder since most is from 5/2021. Ordered SW consult for support and any needs that patient and wife may have at this time. Reinforced heart healthy, low salt diet - once patient is done with his procedure and ok per GI  Reinforced appropriate fluid intake of 6 x 8oz glasses of water per day  Monitor and record daily standing weights and strict I/O's    Rest per primary team    LIFE GOALS:  Patient's personal goals include: complete procedures for LVAD evaluation  Important upcoming milestones or family events: nothing at this time  The patient identifies the following as important for living well: be more active, do activities w/o SOB    IMPRESSION:  Fatigue at rest  Shortness of breath with exertion  Acute on chronic systolic heart failure  · Stage D, NYHA class IV symptoms improved to class II on inotropes  · Non-ischemic cardiomyopathy, LVEF 20% with LVEDD 6.2  · RV dysfunction, TAPSE 1.22 (prelimnary read)  · TBili 1.5 likely from cardiac congestion  At risk of sudden cardiac death  · Recent cardiac arrest   · S/p ICD (1/2013, [employer] followed by [address] [place] [address]);  New implant on 10/21/2021 Exo Vigilant  Coronary artery disease  · S/p multiple interventions  · S/p 4V CABG (8/2012)  · LHC (7/2019) severe stenosis of LIMA to LAD anastomosis site, SVG graft to OM is occluded, SVG graft to RCA 40-50%, LAD occluded proximally, severe proximal LCx, RCA is the long . · PET (6/2019) EF 24% with anterior lateral and inferior reversible defect  Cardiac risk factors  · HTN  · HL  · DM2  · SRUTHI on CPAP  · MIld carotid stenosis  Anemia, microcytic  · Iron deficiency  DVT with filter  Pulmonary nodules   Dysphagia        CARDIAC IMAGING:  ECHO ( 2/2/22):    Left Ventricle: Left ventricle is moderately dilated. Mildly increased wall thickness. Severe global hypokinesis present. Severely reduced left ventricular systolic function with a visually estimated EF of 15 - 20%. Grade II diastolic dysfunction with increased LAP.   Right Ventricle: Right ventricle is mildly dilated. Mildly reduced systolic function.   Aortic Valve: Mild sclerosis of the aortic valve cusps. Mild transvalvular regurgitation.   Mitral Valve: Mild transvalvular regurgitation.   Tricuspid Valve: Moderate transvalvular regurgitation. Mildly elevated RVSP. RVSP is 39 mmHg.   Left Atrium: Left atrium is moderately dilated.        Echo (11/23/21):  · LV 15-20%.    · Mod-severe, MR, mod-TR     Echo (5/20/21)  · LV: Estimated LVEF is 20 - 25%. Normal wall thickness. Mildly dilated left ventricle. Severely and globally reduced systolic function. Severe (grade 4) left ventricular diastolic dysfunction. · LA: Severely dilated left atrium. · RA: Severely dilated right atrium. · MV: Moderate mitral valve regurgitation is present. · TV: Moderate tricuspid valve regurgitation is present. · AO: Mild aortic root dilatation. · RV: Pacer/ICD present. · LVED 6.2cm     EKG (5/20/21) SB with 1degree AV block, QRS 116ms     LHC (5/24/21) Native Coronaries: LM - moderate to severe distal disease 50%. % prox occluded (), heavily calcified, LCx: 80% proximal stenosis. OM1 is  (seen filling faintly via collaterals). OM2 99% stenosis. RCA: 100% proximal occluded.  LIMA to LAD: patent, supplies only a diagonal branch (probably D2).  The LAD distal to the bifurcation is 100% occluded () and fills via right to left collaterals off the SVG to RCA. SVG to R-PDA: patent with moderate diffuse irregularities but no obstructive disease in the graft.    No appealing interventional targets.      NST as above     ICD Interrogation (11/12/2021) [employer] VVI, thoracic impedence trending up, no events, normal device function and good battery  ICD interrogation (5/12/21) [employer] single lead, no events, normal device function and good battery     HEMODYNAMICS:  RHC 5/24/21 CI 1.97, PA 33/9/17, RA 1, PCWP 6- off milrinone   CPEST not done     Patient underwent a 6 minute walk test 11/12/2021     6 Min Walk Report 11/12/2021 7/6/2021 5/20/2021   (PRE) HR 88 98 74   (PRE) O2 Sat 100 - 99   (POST)  - 81   (POST) O2 Sat 99 98% on Ra 99   Distance in Meters 386.58 - 1158.24          OTHER IMAGING:  CXR (1/24/22): FINDINGS: PA and lateral radiographs of the chest demonstrate a new infiltrate  in the apical segment of the left lower lobe. The cardiac and mediastinal  contours and pulmonary vascularity are stable.  The bones and soft tissues are  within normal limits. IMPRESSION: Left lower lobe pneumonia     CXR (6/17/21) clear     Duplex LE venous Bilal (2/2/2021)  Right Lower Venous  Varicose vein(s) present. Varicose veins are patent. The common femoral, great saphenous, profunda femoral, femoral, popliteal, gastrocnemius, posterior tibial, peroneal and saphenous femoral junction vein(s) were imaged in the transverse and longitudinal planes. The vessels showed normal color filling and compressibility. Doppler interrogation of the veins showed phasic and spontaneous flow.      Left Lower Venous  Varicose vein(s) present. Varicose veins are patent. The common femoral, great saphenous, saphenous femoral junction, profunda femoral, femoral, popliteal, gastrocnemius, posterior tibial and peroneal vein(s) were imaged in the transverse and longitudinal planes.  The vessels showed normal color filling and compressibility. Doppler interrogation of the veins showed phasic and spontaneous flow.         CT chest (2/1/22)  1. No pulmonary emboli. 2. Pleural effusions. 3. Treated RAVEN carcinoma as discussed.     CT 5/21/21 1. Irregular pulmonary nodule in the left upper lobe measuring 2 cm, suspicious for primary pulmonary malignancy. 2. Additional 6 mm left upper lobe pulmonary nodule. 3. Severe calcific atherosclerosis of the coronary arteries and abdominal aorta. No aneurysm. 4. Cardiomegaly. 5. No acute abnormality within the chest, abdomen, or pelvis.       HPI:   Briefly, Nathaly Negron is a 69 y.o. male with h/o HTN, HL, DM2, SRUTHI on CPAP, chronic systolic heart failure, stage D, NYHA class IV symptoms, non-ischemic cardiomyopathy, LVEF 20% with LVEDD 6.2, RV dysfunciton, TAPSE 1.22, TBili 1.5 likely from cardiac congestion, recent cardiac arrest s/p ICD (1/2013, [address] followed by [employer] coronary artery disease s/p multiple interventions s/p 4V CABG (8/2012), LHC (7/2019) severe stenosis of LIMA to LAD anastomosis site, SVG graft to OM is occluded, SVG graft to RCA 40-50%, LAD occluded proximally, severe proximal LCx, RCA is the long . PET (6/2019) EF 24% with anterior lateral and inferior reversible defect. Anemia, microcytic with iron deficiency, DVT with filter. [facility] by Dr. Yandy Tomlinson for evaluation of his candidacy for advanced therapies.     Patient agreed to inpatient initiation of palliative infusion of chronic inotropes and evaluation for LVAD-DT. Patient was admitted 5/2-5/25/21. Patient was started on milrinone infusion and had first part of LVAD evaluation completed. Right and left heart cath on 5/24/21 that showed severe diffuse native vessel CAD, with patent grafts (LIMA to D2, SVG to RCA).  Antiplatelet therapy was held during hospitalization and after discharge due to anticipated pulmonary nodule biopsy, bone marrow biopsy and EGD/C-Scope as part of LVAD workup. Patient was readmitted 5/26-5/28/21 with hypertension, headache and chest pain, his troponin peaked at 10; he was shortly bridged with heparin, otherwise had normal EKG and chest CT negative for PE. Cardiology and AHF service was consulted and patient was discharged home after troponin came down.      Patient has now completed radiation treatment for spot in the lungs. Hem-onc has cleared patient. Patient will follow up with his pulmonary MD in 1-2 months. He is agreeable to proceed/complete with LVAD evaluation. He was direct-admitted today 3/1/2022 to complete GI scopes inpatient. INTERVAL HISTORY:  Patient awake, resting in bed, no acute distress  No family at the bedside  NPO for Scopes this afternoon  Cr 1.03  NT-proBNP 3442      PHYSICAL EXAM:  Visit Vitals  BP (!) 123/59   Pulse 77   Temp 97.4 °F (36.3 °C)   Resp 18   Ht 6' 1\" (1.854 m)   Wt 165 lb 5.5 oz (75 kg)   SpO2 99%   BMI 21.81 kg/m²     General: Patient is well developed, well-nourished in no acute distress, resting in bed  HEENT: Normocephalic and atraumatic. No scleral icterus. Pupils are equal, round and reactive to light and accomodation. No conjunctival injection. Oropharynx is clear. Neck: Supple. No evidence of thyroid enlargements or lymphadenopathy. JVD: Cannot be appreciated   Lungs: Breath sounds are equal and clear bilaterally. No wheezes, rhonchi, or rales. Heart: Regular rate and rhythm with normal S1 and S2. No murmurs, gallops or rubs. Abdomen: Soft, no mass or tenderness. No organomegaly or hernia. Bowel sounds present. Genitourinary and rectal: deferred  Extremities: No cyanosis, clubbing, or edema. Neurologic: No focal sensory or motor deficits are noted. Grossly intact. Psychiatric: Awake, alert an doriented x 3. Appropriate mood and affect. Skin: Warm, dry and well perfused. No lesions, nodules or rashes are noted. REVIEW OF SYSTEMS:  General: Denies fever, night sweats.   Ear, nose and throat: Denies difficulty hearing, sinus problems, runny nose, post-nasal drip, ringing in ears, mouth sores, loose teeth, ear pain, nosebleeds, sore throate, facial pain or numbess  Cardiovascular: see above in the interval history  Respiratory: Denies cough, wheezing, sputum production, hemoptysis. +SOB with activity  Gastrointestinal: Denies heartburn, constipation, intolerance to certain foods, diarrhea, abdominal pain, nausea, vomiting, difficulty swallowing, blood in stool  Kidney and bladder: Denies painful urination, frequent urination, urgency, prostate problems and impotence  Musculoskeletal: Denies joint pain, muscle weakness  Skin and hair: Denies change in existing skin lesions, hair loss or increase, breast changes    PAST MEDICAL HISTORY:  Past Medical History:   Diagnosis Date    CAD (coronary artery disease) '90 '97, '13 x 2    [place] [facility] [place] Calculus of kidney     Colonic polyps     Congestive heart failure, unspecified     Diabetes ([facility] [address]     Gastritis     Hypercholesterolemia     Sleep apnea        PAST SURGICAL HISTORY:  Past Surgical History:   Procedure Laterality Date    COLONOSCOPY  04/06/2011    16, due 21    ENDOSCOPY, COLON, DIAGNOSTIC      11, due 16    HX CORONARY ARTERY BYPASS GRAFT  8/22/12    x 4 vessel by S.  James    HX HEENT      LASIK    HX PACEMAKER PLACEMENT  1/30/13    IN CARDIAC SURG PROCEDURE UNLIST  2012    x 4 vessels       FAMILY HISTORY:  Family History   Problem Relation Age of Onset    Heart Disease Mother         MI    Heart Disease Father         CAD & PVD    Lung Disease Father     Cancer Father         lung    Diabetes Maternal Grandmother     Heart Disease Other         CAD    Stroke Sister        SOCIAL HISTORY:  Social History     Socioeconomic History    Marital status:    Tobacco Use    Smoking status: Never Smoker    Smokeless tobacco: Never Used   Vaping Use    Vaping Use: Never used   Substance and Sexual Activity    Alcohol use: Yes     Alcohol/week: 0.0 standard drinks     Comment:  VERY RARE    Drug use: No       LABORATORY RESULTS:     Labs Latest Ref Rng & Units 3/2/2022 3/1/2022 2/5/2022 2/4/2022 2/3/2022 2/2/2022 2/2/2022   WBC 4.1 - 11.1 K/uL 2. 9(L) 3.0(L) 3.4(L) 3. 3(L) 2. 6(L) - -   RBC 4.10 - 5.70 M/uL 4.95 5.04 4.48 4.42 4.38 - -   Hemoglobin 12.1 - 17.0 g/dL 11. 7(L) 11. 8(L) 10. 2(L) 10. 2(L) 10. 1(L) - -   Hematocrit 36.6 - 50.3 % 37.2 37.7 32. 4(L) 31. 9(L) 31. 4(L) - -   MCV 80.0 - 99.0 FL 75. 2(L) 74. 8(L) 72. 3(L) 72. 2(L) 71. 7(L) - -   Platelets 520 - 432 K/uL 140(L) 154 135(L) 125(L) 123(L) - -   Lymphocytes 12 - 49 % 26 18 19 26 29 - -   Monocytes 5 - 13 % 12 14(H) 14(H) 15(H) 15(H) - -   Eosinophils 0 - 7 % 3 2 2 3 4 - -   Basophils 0 - 1 % 1 1 1 1 1 - -   Albumin 3.5 - 5.0 g/dL 3. 0(L) 3.6 3.3(L) 3. 2(L) 2. 9(L) - -   Calcium 8.5 - 10.1 MG/DL 9.0 9.4 9.4 8.8 8.6 9.1 8.8   Glucose 65 - 100 mg/dL 94 134(H) 46(LL) 66 101(H) 200(H) 213(H)   BUN 6 - 20 MG/DL 16 21(H) 20 22(H) 20 21(H) 21(H)   Creatinine 0.70 - 1.30 MG/DL 1.03 1.18 1.30 1.24 1.15 1.26 1.18   Sodium 136 - 145 mmol/L 132(L) 132(L) 136 137 138 136 137   Potassium 3.5 - 5.1 mmol/L 3.6 3.6 3.6 3.9 3.8 3.6 3. 2(L)   TSH 0.36 - 3.74 uIU/mL - 1.68 - - - - -   LDH 85 - 241 U/L 145 - - - - - -   Some recent data might be hidden     Lab Results   Component Value Date/Time    TSH 1.68 03/01/2022 11:50 AM    TSH 1.47 05/26/2021 10:44 PM    TSH 1.97 05/20/2021 04:28 PM    TSH 1.41 02/09/2021 03:05 PM    TSH 1.740 08/14/2018 09:58 AM    TSH 1.710 10/18/2017 12:00 AM       ALLERGY:  Allergies   Allergen Reactions    Oxycodone Anaphylaxis    Pcn [Penicillins] Hives        CURRENT MEDICATIONS:    Current Facility-Administered Medications:     0.9% sodium chloride infusion, 75 mL/hr, IntraVENous, CONTINUOUS, Daniel Mark MD    sodium chloride (NS) flush 5-40 mL, 5-40 mL, IntraVENous, Q8H, Daniel Mark MD    sodium chloride (NS) flush 5-40 mL, 5-40 mL, IntraVENous, PRN, Patti Rueda MD    midazolam (VERSED) injection 0.25-5 mg, 0.25-5 mg, IntraVENous, Multiple, Kasey Mark MD    fentaNYL citrate (PF) injection 12.5-200 mcg, 12.5-200 mcg, IntraVENous, Multiple, Kasey Mark MD    naloxone [facility]) injection 0.4 mg, 0.4 mg, IntraVENous, Multiple, Kasey Mark MD    flumazeniL (ROMAZICON) 0.1 mg/mL injection 0.2 mg, 0.2 mg, IntraVENous, Multiple, Kasey Mark MD    [facility]) 16QK/3.6WI oral drops 80 mg, 1.2 mL, Oral, Multiple, Patti Rueda MD    atropine injection 0.5 mg, 0.5 mg, IntraVENous, ONCE PRN, Patti Rueda MD    EPINEPHrine (ADRENALIN) 0.1 mg/mL syringe 1 mg, 1 mg, Endoscopically, ONCE PRN, Patti Rueda MD    aspirin delayed-release tablet 81 mg, 81 mg, Oral, DAILY, Yesy Broderick NP    bumetanide (BUMEX) tablet 2 mg, 2 mg, Oral, BID, Yesy Broderick NP, 2 mg at 03/01/22 1741    eplerenone (INSPRA) tablet 50 mg, 50 mg, Oral, DAILY, Yesy Broderick NP    hydrALAZINE (APRESOLINE) tablet 50 mg, 50 mg, Oral, TID, Yesy Broderick NP, 50 mg at 03/02/22 1035    isosorbide mononitrate ER (IMDUR) tablet 30 mg, 30 mg, Oral, Q12H, Yesy Broderick NP, 30 mg at 03/02/22 1035    milrinone (PRIMACOR) 20 MG/100 ML D5W infusion, 0.375 mcg/kg/min, IntraVENous, CONTINUOUS, Yesy Broderick NP, Last Rate: 8.6 mL/hr at 03/02/22 1100, 0.375 mcg/kg/min at 03/02/22 1100    pantoprazole (PROTONIX) tablet 20 mg, 20 mg, Oral, ACB, Yesy Broderick NP, 20 mg at 03/02/22 0719    potassium chloride SR (KLOR-CON 10) tablet 40 mEq, 40 mEq, Oral, BID, Yesy Broderick NP, 40 mEq at 03/01/22 1741    tamsulosin (FLOMAX) capsule 0.4 mg, 0.4 mg, Oral, DAILY, Yesy Broderick NP, 0.4 mg at 03/02/22 1035    rosuvastatin (CRESTOR) tablet 10 mg, 10 mg, Oral, QHS, Yesy Broderick NP, 10 mg at 03/01/22 2242    ranolazine ER (RANEXA) tablet 500 mg, 500 mg, Oral, Q12H, Gunner Porter NP, 500 mg at 03/02/22 1046   glucose chewable tablet 16 g, 4 Tablet, Oral, PRN, Slava Donovan MD    dextrose 10 % infusion 0-250 mL, 0-250 mL, IntraVENous, PRN, Slava Donovan MD    glucagon (GLUCAGEN) injection 1 mg, 1 mg, IntraMUSCular, PRN, Slava Donovan MD    insulin lispro (HUMALOG) injection, , SubCUTAneous, Q6H, Ramona Godwin MD    sodium chloride (NS) flush 5-40 mL, 5-40 mL, IntraVENous, Q8H, Ramona Godwin MD, 10 mL at 03/02/22 0541    sodium chloride (NS) flush 5-40 mL, 5-40 mL, IntraVENous, PRN, Slava Donovan MD    acetaminophen (TYLENOL) tablet 650 mg, 650 mg, Oral, Q4H PRN, Slava Donovan MD    PATIENT CARE TEAM:  Patient Care Team:  Lili Huntley MD as PCP - General (Internal Medicine)  Lili Huntley MD as PCP - [address] [place] [address] Provider  Demetria Casiano MD as Physician (Cardiology)  Efrain Barron MD as Physician (Cardiology)  John Hernandez MD as Physician (Gastroenterology)  Marielena Islas MD as Physician (Orthopedic Surgery)  Elis Fortune MD as Physician (Ophthalmology)  Vista Ross Wilms, MD as Physician ([address])  Simin Savage MD (Cardiology)  Berna Sales MD (Cardiology)       Thank you for your referral and allowing me to participate in this patient's care. Shanell Chaudhry DNP, [facility], [facility], [facility]  Heart Failure Nurse Practitioner   [facility]   [address] [facility] [employer] [facility] sara, [address]  W: [phone]/ F: [phone]    [facility] ATTENDING ADDENDUM    Patient was seen and examined in person. Data and notes were reviewed. I have discussed and agree with the plan as noted in the NP note above without further additions.     Jill Guardado MD PhD  Nicolas Whitaker [phone]

## 2022-03-02 NOTE — PROGRESS NOTES
[facility]                                                                                          Hospitalist Progress Note  Kye Schaffer MD  Answering service: [phone] from in house phone        Date of Service:  3/2/2022  NAME:  Lodema Rinne  :  1945  MRN:  [id number]      Admission Summary:   HPI: Tamanna Stock is a 69 y.o. male who presents with heart failure     ,[id number] is a 69 y. o. male with h/o HTN, HL, DM2, SRUTHI on CPAP, chronic systolic heart failure, stage D, NYHA class IV symptoms, non-ischemic cardiomyopathy, LVEF 20% with LVEDD 6.2, RV dysfunciton, TAPSE 1.22, TBili 1.5 likely from cardiac congestion, recent cardiac arrest s/p ICD (2013, [address] followed by [facility]), coronary artery disease s/p multiple interventions s/p 4V CABG (2012), LHC (2019) severe stenosis of LIMA to LAD anastomosis site, SVG graft to OM is occluded, SVG graft to RCA 40-50%, LAD occluded proximally, severe proximal LCx, RCA is the long . PET (2019) EF 24% with anterior lateral and inferior reversible defect. Anemia, microcytic with iron deficiency, DVT with filter. [facility] by Dr. Jailyn Knott for evaluation of his candidacy for advanced therapies.     Patient agreed to inpatient initiation of palliative infusion of chronic inotropes and evaluation for LVAD-DT. Patient was admitted -21. Patient was started on milrinone infusion and had first part of LVAD evaluation completed. Right and left heart cath on 21 that showed severe diffuse native vessel CAD, with patent grafts (LIMA to D2, SVG to RCA).  Antiplatelet therapy was held during hospitalization and after discharge due to anticipated pulmonary nodule biopsy, bone marrow biopsy and EGD/C-Scope as part of LVAD workup.  Patient was readmitted -21 with hypertension, headache and chest pain, his troponin peaked at 10; he was shortly bridged with heparin, otherwise had normal EKG and chest CT negative for PE. Cardiology and AHF service was consulted and patient was discharged home after troponin came down.      Patient has now completed radiation treatment for spot in the lungs. Hem-onc has cleared patient. Patient will follow up with his pulmonary MD in 1-2 months. He is agreeable to proceed/complete with LVAD evaluation. He was direct-admitted today 3/1/2022 to complete GI scopes inpatient. \"    Interval history / Subjective: Follow-up for LVAD preop evaluation    Plan for EGD, colonoscopy today. Otherwise feels well no acute complaints     Assessment & Plan:     Acute on chronic systolic CHF NYHA IV  -LVAD pre-op eval   LVAD-DT evaluation was placed on hold due to discovery of lung nodule posittive for adenocarcinoma which required radiation therapy and ICD explant/reimplant; patient did not complete evaluation (EGD/C-scope needed)   - continue milrinone GTT, clear liquid diet and n.p.o. after midnight for EGD and colonoscopy planned for today    -appreciate AHF team recs   -continue eplerenone, potassium replacement, hydralazine, Bumex 2 mg p.o. twice daily, aspirin, Effient, Crestor    Coronary artery disease  Hypertension  Hyperlipidemia  -meds as above cont'd    Diabetes mellitus type 2 - SSI readjust prn     Code status: Full  DVT prophylaxis: scd    Care Plan discussed with: Patient/Family, Nurse and   Anticipated Disposition: [facility] vs home w/ fam  Anticipated Discharge: 24 hours to 50 hours     Hospital Problems  Date Reviewed: 2/24/2022          Codes Class Noted POA    CHF (congestive heart failure) ([facility] [address] ICD-10-CM: I50.9  ICD-9-CM: 428.0  2/1/2022 Unknown                Review of Systems:   A comprehensive review of systems was negative except for that written in the HPI. Vital Signs:    Last 24hrs VS reviewed since prior progress note.  Most recent are:  Visit Vitals  /68   Pulse 71   Temp 97.4 °F (36.3 °C) Resp 18   Ht 6' 1\" (1.854 m)   Wt 75 kg (165 lb 5.5 oz)   SpO2 95%   BMI 21.81 kg/m²         Intake/Output Summary (Last 24 hours) at 3/2/2022 1520  Last data filed at 3/2/2022 1039  Gross per 24 hour   Intake 3680.53 ml   Output 100 ml   Net 3580.53 ml        Physical Examination:     I had a face to face encounter with this patient and independently examined them on 3/2/2022 as outlined below:          Constitutional:  No acute distress, cooperative, pleasant    ENT:  Oral mucosa moist, oropharynx benign. Resp:  CTA bilaterally. No wheezing/rhonchi/rales. No accessory muscle use   CV:  Regular rhythm, normal rate, no murmurs, gallops, rubs    GI:  Soft, non distended, non tender. normoactive bowel sounds, no hepatosplenomegaly     Musculoskeletal:  No edema, warm, 2+ pulses throughout    Neurologic:  Moves all extremities. AAOx3, CN II-XII reviewed            Data Review:    Review and/or order of clinical lab test  Review and/or order of tests in the radiology section of CPT  Review and/or order of tests in the medicine section of CPT      Labs:     Recent Labs     03/02/22  0343 03/01/22  1151   WBC 2.9* 3.0*   HGB 11.7* 11.8*   HCT 37.2 37.7   * 154     Recent Labs     03/02/22  0343 03/01/22  1153   * 132*   K 3.6 3.6   CL 99 97   CO2 29 28   BUN 16 21*   CREA 1.03 1.18   GLU 94 134*   CA 9.0 9.4   MG 2.1 2.3     Recent Labs     03/02/22  0343 03/01/22  1153   ALT 14 15   * 191*   TBILI 0.7 1.0   TP 6.8 7.3   ALB 3.0* 3.6   GLOB 3.8 3.7     Recent Labs     03/01/22  1150   INR 1.2*   PTP 12.0*      Recent Labs     03/02/22  1053   TIBC 294   PSAT 11*   FERR 108      Lab Results   Component Value Date/Time    Folate 15.7 05/23/2021 04:48 AM      No results for input(s): PH, PCO2, PO2 in the last 72 hours. No results for input(s): CPK, CKNDX, TROIQ in the last 72 hours.     No lab exists for component: CPKMB  Lab Results   Component Value Date/Time    Cholesterol, total 134 03/01/2022 11:50 AM    HDL Cholesterol 64 03/01/2022 11:50 AM    LDL, calculated 56.2 03/01/2022 11:50 AM    Triglyceride 69 03/01/2022 11:50 AM    CHOL/HDL Ratio 2.1 03/01/2022 11:50 AM     Lab Results   Component Value Date/Time    Glucose (POC) 111 03/02/2022 11:49 AM    Glucose (POC) 105 03/02/2022 07:15 AM    Glucose (POC) 131 (H) 03/02/2022 12:59 AM    Glucose (POC) 95 03/01/2022 05:08 PM    Glucose (POC) 148 (H) 03/01/2022 11:54 AM     Lab Results   Component Value Date/Time    Color YELLOW/STRAW 03/01/2022 01:24 PM    Appearance CLEAR 03/01/2022 01:24 PM    Specific gravity 1.016 03/01/2022 01:24 PM    pH (UA) 5.5 03/01/2022 01:24 PM    Protein Negative 03/01/2022 01:24 PM    Glucose >1,000 (A) 03/01/2022 01:24 PM    Ketone Negative 03/01/2022 01:24 PM    Bilirubin Negative 03/01/2022 01:24 PM    Urobilinogen 0.2 03/01/2022 01:24 PM    Nitrites Negative 03/01/2022 01:24 PM    Leukocyte Esterase Negative 03/01/2022 01:24 PM    Epithelial cells FEW 02/04/2022 08:17 PM    Bacteria Negative 02/04/2022 08:17 PM    WBC 0-4 02/04/2022 08:17 PM    RBC 0-5 02/04/2022 08:17 PM         Medications Reviewed:     Current Facility-Administered Medications   Medication Dose Route Frequency    0.9% sodium chloride infusion  75 mL/hr IntraVENous CONTINUOUS    sodium chloride (NS) flush 5-40 mL  5-40 mL IntraVENous Q8H    sodium chloride (NS) flush 5-40 mL  5-40 mL IntraVENous PRN    iron sucrose (VENOFER) 200 mg in 0.9% sodium chloride 100 mL IVPB  200 mg IntraVENous Q24H    aspirin delayed-release tablet 81 mg  81 mg Oral DAILY    bumetanide (BUMEX) tablet 2 mg  2 mg Oral BID    eplerenone (INSPRA) tablet 50 mg  50 mg Oral DAILY    hydrALAZINE (APRESOLINE) tablet 50 mg  50 mg Oral TID    isosorbide mononitrate ER (IMDUR) tablet 30 mg  30 mg Oral Q12H    milrinone (PRIMACOR) 20 MG/100 ML D5W infusion  0.375 mcg/kg/min IntraVENous CONTINUOUS    pantoprazole (PROTONIX) tablet 20 mg  20 mg Oral ACB    potassium chloride SR (KLOR-CON 10) tablet 40 mEq  40 mEq Oral BID    tamsulosin (FLOMAX) capsule 0.4 mg  0.4 mg Oral DAILY    rosuvastatin (CRESTOR) tablet 10 mg  10 mg Oral QHS    ranolazine ER (RANEXA) tablet 500 mg  500 mg Oral Q12H    glucose chewable tablet 16 g  4 Tablet Oral PRN    dextrose 10 % infusion 0-250 mL  0-250 mL IntraVENous PRN    glucagon (GLUCAGEN) injection 1 mg  1 mg IntraMUSCular PRN    insulin lispro (HUMALOG) injection   SubCUTAneous Q6H    sodium chloride (NS) flush 5-40 mL  5-40 mL IntraVENous Q8H    sodium chloride (NS) flush 5-40 mL  5-40 mL IntraVENous PRN    acetaminophen (TYLENOL) tablet 650 mg  650 mg Oral Q4H PRN     ______________________________________________________________________  EXPECTED LENGTH OF STAY: - - -  ACTUAL LENGTH OF STAY:          1                 Ronda Mcgarry MD

## 2022-03-02 NOTE — PROCEDURES
[address]  [address] [facility], [address] [place] [address] Stef  [phone]               Colonoscopy Operative Report      Indications:  Iron deficiency anemia, being planned for LVAD    :  Caden Gibbons MD    Staff: Endoscopy Technician-1: Sheng Byrd  Endoscopy RN-1: Shelbie Capone RN     Referring Provider: Fletcher Power MD    Sedation:  MAC anesthesia    Procedure Details:  After informed consent was obtained with all risks and benefits of procedure explained and preoperative exam completed, the patient was taken to the endoscopy suite and placed in the left lateral decubitus position. Upon sequential sedation as per above, a digital rectal exam was performed  And was normal.  The Olympus videocolonoscope  was inserted in the rectum and carefully advanced to the cecum, which was identified by the ileocecal valve and appendiceal orifice. The quality of preparation was good. The colonoscope was slowly withdrawn with careful evaluation between folds. Retroflexion in the rectum was performed and was normal..     Findings:   Rectum: Grade 1 internal hemorrhoid(s); Sigmoid: 1  Sessile polyp(s), the largest 5 mm in size;      - Diverticulosis  Descending Colon: normal  Transverse Colon: normal  Ascending Colon: normal  Cecum: 1  Sessile polyp(s), the largest 4 mm in size; Terminal Ileum: not intubated    Interventions:  2 complete polypectomy were performed using cold snare  and the polyps were  retrieved    Specimen Removed:   ID Type Source Tests Collected by Time Destination   3 : colon polyp Preservative   Deon Gold MD 3/2/2022 1416 Pathology       EBL:  Minimal    Complications:  None; patient tolerated the procedure well.     Impression:  -Two small (4-5 mm) polyps found in the cecum and sigmoid colon, removed and sent for pathology  -Mild sigmoid colon diverticulosis  -Small internal hemorrhoids    Recommendations:   -Return patient to hospital floor for ongoing care.   -Resume normal medication(s). -Begin fiber supplement daily   -High fiber diet. GERD diet: avoid fried and fatty foods. peppermint, chocolate, alcohol, coffee, citrus fruits and juices, tomoato products; avoid lying down for 2 to 3 hours after eating.     -Await pathology.  -No further colorectal cancer screening recommended considering age. -Please call if any further questions/concerns. Discharge Disposition:  Home in the company of a  when able to ambulate.     Marlen Willis MD  3/2/2022  2:34 PM

## 2022-03-02 NOTE — ANESTHESIA PREPROCEDURE EVALUATION
Relevant Problems   No relevant active problems       Anesthetic History               Review of Systems / Medical History      Pulmonary        Sleep apnea           Neuro/Psych              Cardiovascular          CHF  Dysrhythmias   CAD      Comments: Global Hypokinesis EF 10-15 percent   GI/Hepatic/Renal     GERD           Endo/Other    Diabetes         Other Findings              Physical Exam    Airway  Mallampati: I           Cardiovascular    Rhythm: regular          Comments: Strong radial pulses Dental         Pulmonary                 Abdominal         Other Findings            Anesthetic Plan    ASA: 4  Anesthesia type: MAC and general - backup          Induction: Intravenous  Anesthetic plan and risks discussed with: Patient

## 2022-03-02 NOTE — PROCEDURES
[address]  [address]   [phone]           Endoscopic Gastroduodenoscopy Procedure Note    Darshan Yip Sr  [birth year]  [id number]    Indication: Iron deficiency anemia. Being planned for LVAD     : Dorinda Diez MD    Staff: Endoscopy Technician-1: Tan Ott  Endoscopy RN-1: Fred Pierce RN     Referring Provider:  Kathryn Grimm MD      Anesthesia/Sedation:  MAC anesthesia      Procedure Details     After infomed consent was obtained for the procedure, with all risks and benefits of procedure explained the patient was taken to the endoscopy suite and placed in the left lateral decubitus position. Following sequential administration of sedation as per above, the endoscope was inserted into the mouth and advanced under direct vision to third portion of the duodenum. A careful inspection was made as the gastroscope was withdrawn, including a retroflexed view of the proximal stomach; findings and interventions are described below. Findings:   Esophagus:normal  Stomach: Small sliding hiatal hernia. Mild antral gastropathy with small erosions. Two very small nodules with central depression (4-6 mm) seen in the gastric antrum suspicious for pancreatic rests, along the greater curvature. Random gastric biopsies obtained and separate biopsies obtained from the nodules. Duodenum/jejunum: normal    Therapies:  biopsy of stomach antrum, body, fundus and separately from gastric nodules. Specimens:   ID Type Source Tests Collected by Time Destination   1 : pathology Preservative Gastric  Tony Cotto MD 3/2/2022 1353 Pathology   2 : antrum Preservative Gastric  Tony Cotto MD 3/2/2022 1354 Pathology               EBL: Minimal    Complications:  None; patient tolerated the procedure well.            Impression:      -Mild gastropathy with erosions in the antrum.  -Small gastric nodules - suspicious for pancreatic rests  -Small hiatal hernia.   -Otherwise negative exam.    Recommendations:  -Resume normal medications  -Acid suppression with a proton pump inhibitor.   -No Non-Steroidal Anti Inflammatorys (NSAIDS)   -Await pathology.   -Proceed with colonoscopy today    Jeannette Singh MD  3/2/2022  1:57 PM

## 2022-03-02 NOTE — NURSE NAVIGATOR
Chart reviewed by Heart Failure Nurse Navigator. Heart Failure database completed. EF:  15/20%     ACEi/ARB/ARNi: discontinued in anticipation of possible cardiac surgery    BB: contraindicated d/t RV dysfunction    Aldosterone Antagonist: eplerenone 50 mg daily    Obstructive Sleep Apnea Screening: has dx SRUTHI, uses CPAP   Referred to Sleep Medicine:     CRT: ICD implanted. NYHA Functional Class IV. Heart Failure Teach Back in Patient Education. Heart Failure Avoiding Triggers on Discharge Instructions. Cardiologist: Dr. Blanco [facility])      Post discharge follow up phone call to be made within 48-72 hours of discharge. 27 DAY HF READMISSION:    Readmit date: 3/1/22    Prior admission: 2/1-2/5/22  Primary final coding: hypertensive heart disease with heart failure    Discharging Provider: Dr. Jarred Bunn            Discharging Unit: [facility] - no SMAART-E discharge done as pt dc'd on home inotrope. Patient was discharged with virtual visit scheduled on 2/7/22 with Manolo Martino NP with [id number]. PCP appointment also scheduled prior to discharge on 2/14/22. Home health was ordered. Patient had office visit with [facility] 3/1/22 and patient was sent to hospital as direct admit to complete GI scopes as inpatient and proceed with/complete LVAD evaluation.

## 2022-03-02 NOTE — PROGRESS NOTES
TRANSFER - IN REPORT:    Verbal report received from radha rn(name) on [facility] [employer] [facility]  being received from 457(unit) for ordered procedure      Report consisted of patients Situation, Background, Assessment and   Recommendations(SBAR). Information from the following report(s) SBAR was reviewed with the receiving nurse. Opportunity for questions and clarification was provided. Assessment completed upon patients arrival to unit and care assumed.

## 2022-03-02 NOTE — PROGRESS NOTES
0800:Preceptor Review of Student Work    3/2/2022  - Shift times - 1930 to 0800    The RN  documentation of patient care for [address] [place] [address] has been reviewed and approved. All medications have been administered under the direct supervision preceptor. Barbie Dial RN       Bedside and Verbal shift change report given to Nikolai Clark  (oncoming nurse) by Cb Bond  (offgoing nurse). Report included the following information SBAR, Kardex, Intake/Output, MAR, Recent Results, Med Rec Status and Cardiac Rhythm NSR.

## 2022-03-03 LAB
ALBUMIN SERPL-MCNC: 2.8 G/DL (ref 3.5–5)
ALBUMIN/GLOB SERPL: 0.8 {RATIO} (ref 1.1–2.2)
ALP SERPL-CCNC: 152 U/L (ref 45–117)
ALT SERPL-CCNC: 14 U/L (ref 12–78)
AMPHET UR QL SCN: NEGATIVE
ANION GAP SERPL CALC-SCNC: 4 MMOL/L (ref 5–15)
AST SERPL-CCNC: 50 U/L (ref 15–37)
BARBITURATES UR QL SCN: NEGATIVE
BASOPHILS # BLD: 0 K/UL (ref 0–0.1)
BASOPHILS NFR BLD: 0 % (ref 0–1)
BENZODIAZ UR QL: NEGATIVE
BILIRUB SERPL-MCNC: 0.5 MG/DL (ref 0.2–1)
BNP SERPL-MCNC: 3366 PG/ML
BUN SERPL-MCNC: 11 MG/DL (ref 6–20)
BUN/CREAT SERPL: 11 (ref 12–20)
CALCIUM SERPL-MCNC: 8.8 MG/DL (ref 8.5–10.1)
CANNABINOIDS UR QL SCN: NEGATIVE
CHLORIDE SERPL-SCNC: 101 MMOL/L (ref 97–108)
CO2 SERPL-SCNC: 31 MMOL/L (ref 21–32)
COCAINE UR QL SCN: NEGATIVE
CREAT SERPL-MCNC: 1.01 MG/DL (ref 0.7–1.3)
DIFFERENTIAL METHOD BLD: ABNORMAL
DRUG SCRN COMMENT,DRGCM: NORMAL
EOSINOPHIL # BLD: 0.1 K/UL (ref 0–0.4)
EOSINOPHIL NFR BLD: 3 % (ref 0–7)
ERYTHROCYTE [DISTWIDTH] IN BLOOD BY AUTOMATED COUNT: 24.1 % (ref 11.5–14.5)
GLOBULIN SER CALC-MCNC: 3.7 G/DL (ref 2–4)
GLUCOSE BLD STRIP.AUTO-MCNC: 110 MG/DL (ref 65–117)
GLUCOSE BLD STRIP.AUTO-MCNC: 121 MG/DL (ref 65–117)
GLUCOSE BLD STRIP.AUTO-MCNC: 155 MG/DL (ref 65–117)
GLUCOSE BLD STRIP.AUTO-MCNC: 98 MG/DL (ref 65–117)
GLUCOSE SERPL-MCNC: 105 MG/DL (ref 65–100)
HCT VFR BLD AUTO: 34.7 % (ref 36.6–50.3)
HGB BLD-MCNC: 10.9 G/DL (ref 12.1–17)
IMM GRANULOCYTES # BLD AUTO: 0 K/UL (ref 0–0.04)
IMM GRANULOCYTES NFR BLD AUTO: 0 % (ref 0–0.5)
LYMPHOCYTES # BLD: 0.7 K/UL (ref 0.8–3.5)
LYMPHOCYTES NFR BLD: 26 % (ref 12–49)
MAGNESIUM SERPL-MCNC: 2.1 MG/DL (ref 1.6–2.4)
MCH RBC QN AUTO: 23.9 PG (ref 26–34)
MCHC RBC AUTO-ENTMCNC: 31.4 G/DL (ref 30–36.5)
MCV RBC AUTO: 75.9 FL (ref 80–99)
METHADONE UR QL: NEGATIVE
MONOCYTES # BLD: 0.3 K/UL (ref 0–1)
MONOCYTES NFR BLD: 12 % (ref 5–13)
NEUTS SEG # BLD: 1.4 K/UL (ref 1.8–8)
NEUTS SEG NFR BLD: 59 % (ref 32–75)
NRBC # BLD: 0 K/UL (ref 0–0.01)
NRBC BLD-RTO: 0 PER 100 WBC
OPIATES UR QL: NEGATIVE
PCP UR QL: NEGATIVE
PLATELET # BLD AUTO: 120 K/UL (ref 150–400)
PMV BLD AUTO: ABNORMAL FL (ref 8.9–12.9)
POTASSIUM SERPL-SCNC: 3.4 MMOL/L (ref 3.5–5.1)
PROT SERPL-MCNC: 6.5 G/DL (ref 6.4–8.2)
RBC # BLD AUTO: 4.57 M/UL (ref 4.1–5.7)
RBC MORPH BLD: ABNORMAL
SERVICE CMNT-IMP: ABNORMAL
SERVICE CMNT-IMP: ABNORMAL
SERVICE CMNT-IMP: NORMAL
SERVICE CMNT-IMP: NORMAL
SODIUM SERPL-SCNC: 136 MMOL/L (ref 136–145)
WBC # BLD AUTO: 2.5 K/UL (ref 4.1–11.1)

## 2022-03-03 PROCEDURE — G0378 HOSPITAL OBSERVATION PER HR: HCPCS

## 2022-03-03 PROCEDURE — 74011000258 HC RX REV CODE- 258: Performed by: NURSE PRACTITIONER

## 2022-03-03 PROCEDURE — 85025 COMPLETE CBC W/AUTO DIFF WBC: CPT

## 2022-03-03 PROCEDURE — 74011250637 HC RX REV CODE- 250/637: Performed by: NURSE PRACTITIONER

## 2022-03-03 PROCEDURE — 83880 ASSAY OF NATRIURETIC PEPTIDE: CPT

## 2022-03-03 PROCEDURE — 94760 N-INVAS EAR/PLS OXIMETRY 1: CPT

## 2022-03-03 PROCEDURE — 65660000000 HC RM CCU STEPDOWN

## 2022-03-03 PROCEDURE — 80053 COMPREHEN METABOLIC PANEL: CPT

## 2022-03-03 PROCEDURE — 82962 GLUCOSE BLOOD TEST: CPT

## 2022-03-03 PROCEDURE — 36415 COLL VENOUS BLD VENIPUNCTURE: CPT

## 2022-03-03 PROCEDURE — 96376 TX/PRO/DX INJ SAME DRUG ADON: CPT

## 2022-03-03 PROCEDURE — 74011000250 HC RX REV CODE- 250: Performed by: INTERNAL MEDICINE

## 2022-03-03 PROCEDURE — 74011250636 HC RX REV CODE- 250/636: Performed by: NURSE PRACTITIONER

## 2022-03-03 PROCEDURE — 65270000029 HC RM PRIVATE

## 2022-03-03 PROCEDURE — 80307 DRUG TEST PRSMV CHEM ANLYZR: CPT

## 2022-03-03 PROCEDURE — 36592 COLLECT BLOOD FROM PICC: CPT

## 2022-03-03 PROCEDURE — 74011000250 HC RX REV CODE- 250: Performed by: FAMILY MEDICINE

## 2022-03-03 PROCEDURE — 99215 OFFICE O/P EST HI 40 MIN: CPT | Performed by: INTERNAL MEDICINE

## 2022-03-03 PROCEDURE — 83735 ASSAY OF MAGNESIUM: CPT

## 2022-03-03 RX ORDER — BACITRACIN 500 UNIT/G
1 PACKET (EA) TOPICAL AS NEEDED
Status: DISCONTINUED | OUTPATIENT
Start: 2022-03-03 | End: 2022-03-04 | Stop reason: HOSPADM

## 2022-03-03 RX ORDER — PRASUGREL 10 MG/1
10 TABLET, FILM COATED ORAL DAILY
Status: DISCONTINUED | OUTPATIENT
Start: 2022-03-03 | End: 2022-03-04 | Stop reason: HOSPADM

## 2022-03-03 RX ORDER — MILRINONE LACTATE 0.2 MG/ML
0.38 INJECTION, SOLUTION INTRAVENOUS CONTINUOUS
Qty: 500 ML | Refills: 1 | Status: SHIPPED
Start: 2022-03-03 | End: 2022-05-06

## 2022-03-03 RX ADMIN — SODIUM CHLORIDE, PRESERVATIVE FREE 10 ML: 5 INJECTION INTRAVENOUS at 07:14

## 2022-03-03 RX ADMIN — POTASSIUM CHLORIDE 40 MEQ: 750 TABLET, EXTENDED RELEASE ORAL at 10:09

## 2022-03-03 RX ADMIN — SODIUM CHLORIDE, PRESERVATIVE FREE 10 ML: 5 INJECTION INTRAVENOUS at 21:16

## 2022-03-03 RX ADMIN — RANOLAZINE 500 MG: 500 TABLET, FILM COATED, EXTENDED RELEASE ORAL at 10:09

## 2022-03-03 RX ADMIN — ASPIRIN 81 MG: 81 TABLET, COATED ORAL at 10:09

## 2022-03-03 RX ADMIN — ISOSORBIDE MONONITRATE 30 MG: 30 TABLET, EXTENDED RELEASE ORAL at 10:09

## 2022-03-03 RX ADMIN — ISOSORBIDE MONONITRATE 30 MG: 30 TABLET, EXTENDED RELEASE ORAL at 21:09

## 2022-03-03 RX ADMIN — TAMSULOSIN HYDROCHLORIDE 0.4 MG: 0.4 CAPSULE ORAL at 10:09

## 2022-03-03 RX ADMIN — PANTOPRAZOLE SODIUM 20 MG: 20 TABLET, DELAYED RELEASE ORAL at 07:14

## 2022-03-03 RX ADMIN — IRON SUCROSE 200 MG: 20 INJECTION, SOLUTION INTRAVENOUS at 13:42

## 2022-03-03 RX ADMIN — ROSUVASTATIN 10 MG: 10 TABLET, FILM COATED ORAL at 21:14

## 2022-03-03 RX ADMIN — RANOLAZINE 500 MG: 500 TABLET, FILM COATED, EXTENDED RELEASE ORAL at 21:08

## 2022-03-03 RX ADMIN — MILRINONE LACTATE IN DEXTROSE 0.38 MCG/KG/MIN: 200 INJECTION, SOLUTION INTRAVENOUS at 23:19

## 2022-03-03 RX ADMIN — PRASUGREL 10 MG: 10 TABLET, FILM COATED ORAL at 12:23

## 2022-03-03 RX ADMIN — POTASSIUM CHLORIDE 40 MEQ: 750 TABLET, EXTENDED RELEASE ORAL at 17:44

## 2022-03-03 RX ADMIN — BUMETANIDE 2 MG: 1 TABLET ORAL at 17:45

## 2022-03-03 RX ADMIN — MILRINONE LACTATE IN DEXTROSE 0.38 MCG/KG/MIN: 200 INJECTION, SOLUTION INTRAVENOUS at 12:23

## 2022-03-03 RX ADMIN — BUMETANIDE 2 MG: 1 TABLET ORAL at 10:09

## 2022-03-03 RX ADMIN — EPLERENONE 50 MG: 50 TABLET, FILM COATED ORAL at 10:11

## 2022-03-03 NOTE — CONSULTS
Patient seen, chart reviewed, note dictated. 68 y/o man with severe ischemic CM on home inotropes, admitted for LVAD assessment. Asked to see for Hem/Onc clearance. Diagnosed with MGUS and had a BM biopsy last summer showing 5% plasma cells. Also found to have FDG avid RAVEN pulmonary adenocarcinoma, s/p SRS. 1. MGUS: This is a precancerous condition and only progresses to multiple myeloma in 1% of patients per year. I feel it is reasonable for him to proceed with LVAD with this pre-cancerous condition. 2. cT1bN0 RAVEN pulmonary adenocarcinoma s/p SRS: The cure rate for T1 and T2 lesions treated with SRS exceeds 90%. Therefore, I feel it is reasonable for him to proceed with LVAD given it is likely he is cured from this and as a never smoker, is not at the same high risk for 2nd malignancy as a long term smoker would be. Thank you for consult.      Kylah Cruz MD  Hem/Onc  [id number]

## 2022-03-03 NOTE — PROGRESS NOTES
[address]  Inpatient Progress Note      Patient name: Haily Chaves  Patient : 1945  Patient MRN: [id number]  Consulting MD: Edgard Rahman MD  Date of service: 22    REASON FOR REFERRAL:  Management of chronic systolic heart failure      PLAN OF CARE:  · 68 y/o male with h/o severe ischemic cardiomyopathy, LVEF 20-25%; stage D, NYHA class IV symptoms on chronic inotropic support with milrinone gtt; patient unerwent LVAD-DT evaluation which was placed on hold due to discovery of lung nodule posittive for adenocarcinoma which required radiation therapy and ICD explant/reimplant; patient did not complete evaluation (EGD/C-scope needed) as he has not been decided if he would like to proceed with LVAD. · Patient was a direct admit 3/1/2022 to complete LVAD evaluation including GI workup  · Outstanding items for evaluation include podiatry (scheduled as OP), hematology clearance, CSS consultation, and updated psychosocial evaluation   · Tentative plans to implant 4/5 pending approval during MRB on 3/11     RECOMMENDATIONS/PLAN:  Continue current medical therapy for heart failure  Continue milrinone 0.375 mcg/kg/min   Scopes complete 3/2, path pending   Intolerant to BB to RV dysfunction   ACEi/ARB/ARNi previously discontinued in anticipation of possible cardiac surgery  Continue current dose of eplerenone 50 mg daily  Continue Potassium 40 mEq po BID  Will restart Farxiga 10 mg daily OP   Continue current dose of hydralazine 50 TID and Imdur 30 mg twice daily  Continue Bumex 2 mg po BID.    Continue ASA 81 mg daily   Resume Effient 10 mg po daily   Continue current dose of rosuvastatin 10 mg daily  Continue ranolazine 500 mg BID (for HR and angina control)  Ordered CPAP therapy - stated uses most of the time at home; patient declined to use in the hospital   Advanced care plan forms completed /filed  Consulted Diabetes management; appreciate recs  Genetic test negative   Reinforced heart healthy, low salt diet - once patient is done with his procedure and ok per GI  Reinforced appropriate fluid intake of 6 x 8oz glasses of water per day  Monitor and record daily standing weights and strict I/O's  OK for D/C once seen by Hematology     Rest per primary team    LIFE GOALS:  Patient's personal goals include: complete procedures for LVAD evaluation  Important upcoming milestones or family events: nothing at this time  The patient identifies the following as important for living well: be more active, do activities w/o SOB    IMPRESSION:  Fatigue at rest  Shortness of breath with exertion  Acute on chronic systolic heart failure  · Stage D, NYHA class IV symptoms improved to class II on inotropes  · Non-ischemic cardiomyopathy, LVEF 20% with LVEDD 6.2  · RV dysfunction, TAPSE 1.22 (prelimnary read)  · TBili 1.5 likely from cardiac congestion  At risk of sudden cardiac death  · Recent cardiac arrest   · S/p ICD (1/2013, [place] [facility] followed by [facility]); New implant on 10/21/2021 [employer]  Coronary artery disease  · S/p multiple interventions  · S/p 4V CABG (8/2012)  · LHC (7/2019) severe stenosis of LIMA to LAD anastomosis site, SVG graft to OM is occluded, SVG graft to RCA 40-50%, LAD occluded proximally, severe proximal LCx, RCA is the long . · PET (6/2019) EF 24% with anterior lateral and inferior reversible defect  Cardiac risk factors  · HTN  · HL  · DM2  · SRUTHI on CPAP  · MIld carotid stenosis  Anemia, microcytic  · Iron deficiency  DVT with filter  Pulmonary nodules   Dysphagia        CARDIAC IMAGING:  ECHO ( 2/2/22):    Left Ventricle: Left ventricle is moderately dilated. Mildly increased wall thickness. Severe global hypokinesis present. Severely reduced left ventricular systolic function with a visually estimated EF of 15 - 20%. Grade II diastolic dysfunction with increased LAP.     Right Ventricle: Right ventricle is mildly dilated. Mildly reduced systolic function.   Aortic Valve: Mild sclerosis of the aortic valve cusps. Mild transvalvular regurgitation.   Mitral Valve: Mild transvalvular regurgitation.   Tricuspid Valve: Moderate transvalvular regurgitation. Mildly elevated RVSP. RVSP is 39 mmHg.   Left Atrium: Left atrium is moderately dilated.        Echo (11/23/21):  · LV 15-20%.    · Mod-severe, MR, mod-TR     Echo (5/20/21)  · LV: Estimated LVEF is 20 - 25%. Normal wall thickness. Mildly dilated left ventricle. Severely and globally reduced systolic function. Severe (grade 4) left ventricular diastolic dysfunction. · LA: Severely dilated left atrium. · RA: Severely dilated right atrium. · MV: Moderate mitral valve regurgitation is present. · TV: Moderate tricuspid valve regurgitation is present. · AO: Mild aortic root dilatation. · RV: Pacer/ICD present. · LVED 6.2cm     EKG (5/20/21) SB with 1degree AV block, QRS 116ms     LHC (5/24/21) Native Coronaries: LM - moderate to severe distal disease 50%. % prox occluded (), heavily calcified, LCx: 80% proximal stenosis. OM1 is  (seen filling faintly via collaterals). OM2 99% stenosis. RCA: 100% proximal occluded.  LIMA to LAD: patent, supplies only a diagonal branch (probably D2).  The LAD distal to the bifurcation is 100% occluded () and fills via right to left collaterals off the SVG to RCA. SVG to R-PDA: patent with moderate diffuse irregularities but no obstructive disease in the graft.    No appealing interventional targets.      NST as above     ICD Interrogation (11/12/2021) [employer] VVI, thoracic impedence trending up, no events, normal device function and good battery  ICD interrogation (5/12/21) [employer] single lead, no events, normal device function and good battery     HEMODYNAMICS:  RHC 5/24/21 CI 1.97, PA 33/9/17, RA 1, PCWP 6- off milrinone   CPEST not done     Patient underwent a 6 minute walk test 11/12/2021     6 Min Walk Report 11/12/2021 7/6/2021/6/2021 5/20/2021   (PRE) HR 88 98 74   (PRE) O2 Sat 100 - 99   (POST)  - 81   (POST) O2 Sat 99 98% on Ra 99   Distance in Meters 386.58 - 1158.24          OTHER IMAGING:  CXR (1/24/22): FINDINGS: PA and lateral radiographs of the chest demonstrate a new infiltrate  in the apical segment of the left lower lobe. The cardiac and mediastinal  contours and pulmonary vascularity are stable.  The bones and soft tissues are  within normal limits. IMPRESSION: Left lower lobe pneumonia     CXR (6/17/21) clear     Duplex LE venous Bilal (2/2/2021)  Right Lower Venous  Varicose vein(s) present. Varicose veins are patent. The common femoral, great saphenous, profunda femoral, femoral, popliteal, gastrocnemius, posterior tibial, peroneal and saphenous femoral junction vein(s) were imaged in the transverse and longitudinal planes. The vessels showed normal color filling and compressibility. Doppler interrogation of the veins showed phasic and spontaneous flow.      Left Lower Venous  Varicose vein(s) present. Varicose veins are patent. The common femoral, great saphenous, saphenous femoral junction, profunda femoral, femoral, popliteal, gastrocnemius, posterior tibial and peroneal vein(s) were imaged in the transverse and longitudinal planes. The vessels showed normal color filling and compressibility. Doppler interrogation of the veins showed phasic and spontaneous flow.         CT chest (2/1/22)  1. No pulmonary emboli. 2. Pleural effusions. 3. Treated RAVEN carcinoma as discussed.     CT 5/21/21 1. Irregular pulmonary nodule in the left upper lobe measuring 2 cm, suspicious for primary pulmonary malignancy. 2. Additional 6 mm left upper lobe pulmonary nodule. 3. Severe calcific atherosclerosis of the coronary arteries and abdominal aorta. No aneurysm. 4. Cardiomegaly.  5. No acute abnormality within the chest, abdomen, or pelvis.       HPI:   Briefly, Waqar Rodriguez is a 69 y.o. male with h/o HTN, HL, DM2, SRUTHI on CPAP, chronic systolic heart failure, stage D, NYHA class IV symptoms, non-ischemic cardiomyopathy, LVEF 20% with LVEDD 6.2, RV dysfunciton, TAPSE 1.22, TBili 1.5 likely from cardiac congestion, recent cardiac arrest s/p ICD (1/2013, [employer] [facility]), coronary artery disease s/p multiple interventions s/p 4V CABG (8/2012), LHC (7/2019) severe stenosis of LIMA to LAD anastomosis site, SVG graft to OM is occluded, SVG graft to RCA 40-50%, LAD occluded proximally, severe proximal LCx, RCA is the long . PET (6/2019) EF 24% with anterior lateral and inferior reversible defect. Anemia, microcytic with iron deficiency, DVT with filter. Patient was referred to [facility] by Dr. Eliazar Ann for evaluation of his candidacy for advanced therapies.     Patient agreed to inpatient initiation of palliative infusion of chronic inotropes and evaluation for LVAD-DT. Patient was admitted 5/2-5/25/21. Patient was started on milrinone infusion and had first part of LVAD evaluation completed. Right and left heart cath on 5/24/21 that showed severe diffuse native vessel CAD, with patent grafts (LIMA to D2, SVG to RCA).  Antiplatelet therapy was held during hospitalization and after discharge due to anticipated pulmonary nodule biopsy, bone marrow biopsy and EGD/C-Scope as part of LVAD workup. Patient was readmitted 5/26-5/28/21 with hypertension, headache and chest pain, his troponin peaked at 10; he was shortly bridged with heparin, otherwise had normal EKG and chest CT negative for PE. Cardiology and AHF service was consulted and patient was discharged home after troponin came down.      Patient has now completed radiation treatment for spot in the lungs. Hem-onc has cleared patient. Patient will follow up with his pulmonary MD in 1-2 months. He is agreeable to proceed/complete with LVAD evaluation.  He was direct-admitted today 3/1/2022 to complete GI scopes inpatient. INTERVAL HISTORY:  Patient awake, resting in bed, no acute distress  Scopes complete  VSS       PHYSICAL EXAM:  Visit Vitals  BP 92/63 (BP 1 Location: Left upper arm, BP Patient Position: At rest)   Pulse 79   Temp 98.2 °F (36.8 °C)   Resp 18   Ht 6' 1\" (1.854 m)   Wt 167 lb 8.8 oz (76 kg)   SpO2 95%   BMI 22.11 kg/m²     General: Patient is well developed, well-nourished in no acute distress, resting in bed  HEENT: Normocephalic and atraumatic. No scleral icterus. Pupils are equal, round and reactive to light and accomodation. No conjunctival injection. Oropharynx is clear. Neck: Supple. No evidence of thyroid enlargements or lymphadenopathy. JVD: Cannot be appreciated   Lungs: Breath sounds are equal and clear bilaterally. No wheezes, rhonchi, or rales. Heart: Regular rate and rhythm with normal S1 and S2. No murmurs, gallops or rubs. Abdomen: Soft, no mass or tenderness. No organomegaly or hernia. Bowel sounds present. Genitourinary and rectal: deferred  Extremities: No cyanosis, clubbing, or edema. Neurologic: No focal sensory or motor deficits are noted. Grossly intact. Psychiatric: Awake, alert an doriented x 3. Appropriate mood and affect. Skin: Warm, dry and well perfused. No lesions, nodules or rashes are noted. REVIEW OF SYSTEMS:  General: Denies fever, night sweats. Ear, nose and throat: Denies difficulty hearing, sinus problems, runny nose, post-nasal drip, ringing in ears, mouth sores, loose teeth, ear pain, nosebleeds, sore throate, facial pain or numbess  Cardiovascular: see above in the interval history  Respiratory: Denies cough, wheezing, sputum production, hemoptysis.   Gastrointestinal: Denies heartburn, constipation, intolerance to certain foods, diarrhea, abdominal pain, nausea, vomiting, difficulty swallowing, blood in stool  Kidney and bladder: Denies painful urination, frequent urination, urgency, prostate problems and impotence  Musculoskeletal: Denies joint pain, muscle weakness  Skin and hair: Denies change in existing skin lesions, hair loss or increase, breast changes    PAST MEDICAL HISTORY:  Past Medical History:   Diagnosis Date    CAD (coronary artery disease) '90 '97, '13 x 2    MI, code ice in 2013 at [facility]    Calculus of kidney     Colonic polyps     Congestive heart failure, unspecified     Diabetes ([facility] [address]     Gastritis     Hypercholesterolemia     Sleep apnea        PAST SURGICAL HISTORY:  Past Surgical History:   Procedure Laterality Date    COLONOSCOPY  04/06/2011    16, due 21    COLONOSCOPY N/A 3/2/2022    COLONOSCOPY    :- [facility] at [facility] ENDOSCOPY    ENDOSCOPY, COLON, DIAGNOSTIC      11, due 16    HX CORONARY ARTERY BYPASS GRAFT  8/22/12    x 4 vessel by MISSY Ramirez    HX HEENT      LASIK    HX PACEMAKER PLACEMENT  1/30/13    OK CARDIAC SURG PROCEDURE UNLIST  2012    x 4 vessels       FAMILY HISTORY:  Family History   Problem Relation Age of Onset    Heart Disease Mother         MI    Heart Disease Father         CAD & PVD    Lung Disease Father     Cancer Father         lung    Diabetes Maternal Grandmother     Heart Disease Other         CAD    Stroke Sister        SOCIAL HISTORY:  Social History     Socioeconomic History    Marital status:    Tobacco Use    Smoking status: Never Smoker    Smokeless tobacco: Never Used   Vaping Use    Vaping Use: Never used   Substance and Sexual Activity    Alcohol use: Yes     Alcohol/week: 0.0 standard drinks     Comment:  VERY RARE    Drug use: No       LABORATORY RESULTS:     Labs Latest Ref Rng & Units 3/3/2022 3/2/2022 3/1/2022 2/5/2022 2/4/2022 2/3/2022 2/2/2022   WBC 4.1 - 11.1 K/uL 2. 5(L) 2. 9(L) 3.0(L) 3.4(L) 3. 3(L) 2. 6(L) -   RBC 4.10 - 5.70 M/uL 4.57 4.95 5.04 4.48 4.42 4.38 -   Hemoglobin 12.1 - 17.0 g/dL 10. 9(L) 11. 7(L) 11. 8(L) 10. 2(L) 10. 2(L) 10. 1(L) -   Hematocrit 36.6 - 50.3 % 34. 7(L) 37.2 37.7 32. 4(L) 31. 9(L) 31. 4(L) -   MCV 80.0 - 99.0 FL 75. 9(L) 75.2(L) 74. 8(L) 72. 3(L) 72. 2(L) 71. 7(L) -   Platelets 292 - 685 K/uL 120(L) 140(L) 154 135(L) 125(L) 123(L) -   Lymphocytes 12 - 49 % 26 26 18 19 26 29 -   Monocytes 5 - 13 % 12 12 14(H) 14(H) 15(H) 15(H) -   Eosinophils 0 - 7 % 3 3 2 2 3 4 -   Basophils 0 - 1 % 0 1 1 1 1 1 -   Albumin 3.5 - 5.0 g/dL 2. 8(L) 3.0(L) 3.6 3.3(L) 3. 2(L) 2. 9(L) -   Calcium 8.5 - 10.1 MG/DL 8.8 9.0 9.4 9.4 8.8 8.6 9.1   Glucose 65 - 100 mg/dL 105(H) 94 134(H) 46(LL) 66 101(H) 200(H)   BUN 6 - 20 MG/DL 11 16 21(H) 20 22(H) 20 21(H)   Creatinine 0.70 - 1.30 MG/DL 1.01 1.03 1.18 1.30 1.24 1.15 1.26   Sodium 136 - 145 mmol/L 136 132(L) 132(L) 136 137 138 136   Potassium 3.5 - 5.1 mmol/L 3.4(L) 3.6 3.6 3.6 3.9 3.8 3.6   TSH 0.36 - 3.74 uIU/mL - - 1.68 - - - -   LDH 85 - 241 U/L - 145 - - - - -   Some recent data might be hidden     Lab Results   Component Value Date/Time    TSH 1.68 03/01/2022 11:50 AM    TSH 1.47 05/26/2021 10:44 PM    TSH 1.97 05/20/2021 04:28 PM    TSH 1.41 02/09/2021 03:05 PM    TSH 1.740 08/14/2018 09:58 AM    TSH 1.710 10/18/2017 12:00 AM       ALLERGY:  Allergies   Allergen Reactions    Oxycodone Anaphylaxis    Pcn [Penicillins] Hives        CURRENT MEDICATIONS:    Current Facility-Administered Medications:     prasugreL (EFFIENT) tablet 10 mg, 10 mg, Oral, DAILY, Sahara Wong NP, 10 mg at 03/03/22 1223    alteplase (CATHFLO) 1 mg in sterile water (preservative free) 1 mL injection, 1 mg, InterCATHeter, PRN, Zain Johnson MD    bacitracin 500 unit/gram packet 1 Packet, 1 Packet, Topical, PRN, Rolando Gilman MD    sodium chloride (NS) flush 5-40 mL, 5-40 mL, IntraVENous, Q8H, Alex Mark MD, 10 mL at 03/03/22 2337    sodium chloride (NS) flush 5-40 mL, 5-40 mL, IntraVENous, PRN, Alma Rosa Colby MD    eplerenone (INSPRA) tablet tab 50 mg (Patient Supplied), 50 mg, Oral, DAILY, Susie Broderick NP, 50 mg at 03/03/22 1011    aspirin delayed-release tablet 81 mg, 81 mg, Oral, DAILY, Remedios Llamas Deborah, NP, 81 mg at 03/03/22 1009    bumetanide (BUMEX) tablet 2 mg, 2 mg, Oral, BID, Cohan, Lowry Barthel, NP, 2 mg at 03/03/22 1009    hydrALAZINE (APRESOLINE) tablet 50 mg, 50 mg, Oral, TID, Marcial Post, NP, 50 mg at 03/02/22 2335    isosorbide mononitrate ER (IMDUR) tablet 30 mg, 30 mg, Oral, Q12H, Cohan, Lowry Barthel, NP, 30 mg at 03/03/22 1009    milrinone (PRIMACOR) 20 MG/100 ML D5W infusion, 0.375 mcg/kg/min, IntraVENous, CONTINUOUS, Cohan, Lowry Barthel, NP, Last Rate: 8.6 mL/hr at 03/03/22 1223, 0.375 mcg/kg/min at 03/03/22 1223    pantoprazole (PROTONIX) tablet 20 mg, 20 mg, Oral, ACB, Cohan, Lowry Barthel, NP, 20 mg at 03/03/22 0714    potassium chloride SR (KLOR-CON 10) tablet 40 mEq, 40 mEq, Oral, BID, Marcial Rahel, NP, 40 mEq at 03/03/22 1009    tamsulosin (FLOMAX) capsule 0.4 mg, 0.4 mg, Oral, DAILY, Cohan, Lowry Barthel, NP, 0.4 mg at 03/03/22 1009    rosuvastatin (CRESTOR) tablet 10 mg, 10 mg, Oral, QHS, Cohan, Lowry Barthel, NP, 10 mg at 03/02/22 2336    ranolazine ER (RANEXA) tablet 500 mg, 500 mg, Oral, Q12H, Cohan, Lowry Barthel, NP, 500 mg at 03/03/22 1009    glucose chewable tablet 16 g, 4 Tablet, Oral, PRN, Eugene Meadows MD    dextrose 10 % infusion 0-250 mL, 0-250 mL, IntraVENous, PRN, Eugene Meadows MD    glucagon (GLUCAGEN) injection 1 mg, 1 mg, IntraMUSCular, PRN, Eugene Meadows MD    insulin lispro (HUMALOG) injection, , SubCUTAneous, Q6H, Ramona Godwin MD    sodium chloride (NS) flush 5-40 mL, 5-40 mL, IntraVENous, Q8H, Ramona Godwin MD, 10 mL at 03/02/22 2338    sodium chloride (NS) flush 5-40 mL, 5-40 mL, IntraVENous, PRN, Eugene Meadows MD    acetaminophen (TYLENOL) tablet 650 mg, 650 mg, Oral, Q4H PRN, Eugene Meadows MD    PATIENT CARE TEAM:  Patient Care Team:  Hari Brooks MD as PCP - General (Internal Medicine)  Hari Brooks MD as PCP - [facility] Provider  Cynthia Renner MD as Physician (Cardiology)  Dae Young MD as Physician (Cardiology)  Loyd Miner MD as Physician (Gastroenterology)  Clarisse Tarango MD as Physician (Orthopedic Surgery)  Serene Mcginnis MD as Physician (Ophthalmology)  Felicita Fellows Wilms, MD as Physician ([address])  Mariam Simmons MD (Cardiology)  Darian Azar MD (Cardiology)       Thank you for your referral and allowing me to participate in this patient's care. Rolan Barnes, EARNESTINE  Heart Failure Nurse Practitioner   [address] Whitaker [id number]   [address] [facility], [facility] [address] / [place]itha Valdez  W: [phone]/ F: [phone]    [facility] ATTENDING ADDENDUM    Patient was seen and examined in person. Data and notes were reviewed. I have discussed and agree with the plan as noted in the NP note above without further additions.     Jessica Gramajo MD PhD  Nicolas Whitaker [phone]

## 2022-03-03 NOTE — PROGRESS NOTES
[facility] [facility] Adult  Hospitalist Group                                                                                          Hospitalist Progress Note  Domingo Kinney MD  Answering service: [phone] OR [phone] from in house phone        Date of Service:  3/3/2022  NAME:  Joby Pink  :  1945  MRN:  [id number]      Admission Summary:   HPI: King Sheron is a 69 y.o. male who presents with heart failure     ,[id number]  is a 69 y. o. male with h/o HTN, HL, DM2, SRUTHI on CPAP, chronic systolic heart failure, stage D, NYHA class IV symptoms, non-ischemic cardiomyopathy, LVEF 20% with LVEDD 6.2, RV dysfunciton, TAPSE 1.22, TBili 1.5 likely from cardiac congestion, recent cardiac arrest s/p ICD (2013, [employer] followed by [facility]), coronary artery disease s/p multiple interventions s/p 4V CABG (2012), LHC (2019) severe stenosis of LIMA to LAD anastomosis site, SVG graft to OM is occluded, SVG graft to RCA 40-50%, LAD occluded proximally, severe proximal LCx, RCA is the long . PET (2019) EF 24% with anterior lateral and inferior reversible defect. Anemia, microcytic with iron deficiency, DVT with filter. [facility] by Dr. Bonita Mchugh for evaluation of his candidacy for advanced therapies.     Patient agreed to inpatient initiation of palliative infusion of chronic inotropes and evaluation for LVAD-DT. Patient was admitted -21. Patient was started on milrinone infusion and had first part of LVAD evaluation completed. Right and left heart cath on 21 that showed severe diffuse native vessel CAD, with patent grafts (LIMA to D2, SVG to RCA).  Antiplatelet therapy was held during hospitalization and after discharge due to anticipated pulmonary nodule biopsy, bone marrow biopsy and EGD/C-Scope as part of LVAD workup.  Patient was readmitted -21 with hypertension, headache and chest pain, his troponin peaked at 10; he was shortly bridged with heparin, otherwise had normal EKG and chest CT negative for PE. Cardiology and AHF service was consulted and patient was discharged home after troponin came down.      Patient has now completed radiation treatment for spot in the lungs. Hem-onc has cleared patient. Patient will follow up with his pulmonary MD in 1-2 months. He is agreeable to proceed/complete with LVAD evaluation. He was direct-admitted today 3/1/2022 to complete GI scopes inpatient. \"    Interval history / Subjective: Follow-up for LVAD preop evaluation    Status post EGD colonoscopy polyps removed hiatal hernia found reviewed EGD finding colonoscopy finding  Awaiting hematology work-up evaluation for pancytopenia as per advanced heart failure team     Assessment & Plan:     Acute on chronic systolic CHF NYHA IV  -LVAD pre-op eval   LVAD-DT evaluation was placed on hold due to discovery of lung nodule posittive for adenocarcinoma which required radiation therapy and ICD explant/reimplant; patient did not complete evaluation (EGD/C-scope needed)   - continue milrinone GTT  -Hematology consult as per advanced heart failure team  -continue eplerenone, potassium replacement, hydralazine, Bumex 2 mg p.o. twice daily, aspirin, Effient, Crestor    Coronary artery disease  Hypertension  Hyperlipidemia  -meds as above cont'd    Diabetes mellitus type 2 - SSI readjust prn     Code status: Full  DVT prophylaxis: scd    Care Plan discussed with: Patient/Family, Nurse and   Anticipated Disposition: [facility] vs home w/ fam  Anticipated Discharge: 24 hours to 50 hours     Hospital Problems  Date Reviewed: 2/24/2022          Codes Class Noted POA    CHF (congestive heart failure) ([facility] [address] ICD-10-CM: I50.9  ICD-9-CM: 428.0  2/1/2022 Unknown                Review of Systems:   A comprehensive review of systems was negative except for that written in the HPI. Vital Signs:    Last 24hrs VS reviewed since prior progress note.  Most recent are:  Visit Vitals  BP (!) 111/53   Pulse 78   Temp 98.1 °F (36.7 °C)   Resp 16   Ht 6' 1\" (1.854 m)   Wt 76 kg (167 lb 8.8 oz)   SpO2 97%   BMI 22.11 kg/m²         Intake/Output Summary (Last 24 hours) at 3/3/2022 1030  Last data filed at 3/3/2022 0430  Gross per 24 hour   Intake 1375.15 ml   Output --   Net 1375.15 ml        Physical Examination:     I had a face to face encounter with this patient and independently examined them on 3/3/2022 as outlined below:          Constitutional:  No acute distress, cooperative, pleasant    ENT:  Oral mucosa moist, oropharynx benign. Resp:  CTA bilaterally. No wheezing/rhonchi/rales. No accessory muscle use   CV:  Regular rhythm, normal rate, no murmurs, gallops, rubs    GI:  Soft, non distended, non tender. normoactive bowel sounds, no hepatosplenomegaly     Musculoskeletal:  No edema, warm, 2+ pulses throughout    Neurologic:  Moves all extremities. AAOx3, CN II-XII reviewed            Data Review:    Review and/or order of clinical lab test  Review and/or order of tests in the radiology section of CPT  Review and/or order of tests in the medicine section of CPT      Labs:     Recent Labs     03/03/22 0432 03/02/22  0343   WBC 2.5* 2.9*   HGB 10.9* 11.7*   HCT 34.7* 37.2   * 140*     Recent Labs     03/03/22 0432 03/02/22 0343 03/01/22  1153    132* 132*   K 3.4* 3.6 3.6    99 97   CO2 31 29 28   BUN 11 16 21*   CREA 1.01 1.03 1.18   * 94 134*   CA 8.8 9.0 9.4   MG 2.1 2.1 2.3     Recent Labs     03/03/22 0432 03/02/22 0343 03/01/22  1153   ALT 14 14 15   * 161* 191*   TBILI 0.5 0.7 1.0   TP 6.5 6.8 7.3   ALB 2.8* 3.0* 3.6   GLOB 3.7 3.8 3.7     Recent Labs     03/01/22  1150   INR 1.2*   PTP 12.0*      Recent Labs     03/02/22  1053   TIBC 294   PSAT 11*   FERR 108      Lab Results   Component Value Date/Time    Folate 15.7 05/23/2021 04:48 AM      No results for input(s): PH, PCO2, PO2 in the last 72 hours.   No results for input(s): CPK, CKNDX, TROIQ in the last 72 hours.     No lab exists for component: CPKMB  Lab Results   Component Value Date/Time    Cholesterol, total 134 03/01/2022 11:50 AM    HDL Cholesterol 64 03/01/2022 11:50 AM    LDL, calculated 56.2 03/01/2022 11:50 AM    Triglyceride 69 03/01/2022 11:50 AM    CHOL/HDL Ratio 2.1 03/01/2022 11:50 AM     Lab Results   Component Value Date/Time    Glucose (POC) 98 03/03/2022 06:45 AM    Glucose (POC) 121 (H) 03/02/2022 11:36 PM    Glucose (POC) 112 03/02/2022 05:51 PM    Glucose (POC) 111 03/02/2022 11:49 AM    Glucose (POC) 105 03/02/2022 07:15 AM     Lab Results   Component Value Date/Time    Color YELLOW/STRAW 03/01/2022 01:24 PM    Appearance CLEAR 03/01/2022 01:24 PM    Specific gravity 1.016 03/01/2022 01:24 PM    pH (UA) 5.5 03/01/2022 01:24 PM    Protein Negative 03/01/2022 01:24 PM    Glucose >1,000 (A) 03/01/2022 01:24 PM    Ketone Negative 03/01/2022 01:24 PM    Bilirubin Negative 03/01/2022 01:24 PM    Urobilinogen 0.2 03/01/2022 01:24 PM    Nitrites Negative 03/01/2022 01:24 PM    Leukocyte Esterase Negative 03/01/2022 01:24 PM    Epithelial cells FEW 02/04/2022 08:17 PM    Bacteria Negative 02/04/2022 08:17 PM    WBC 0-4 02/04/2022 08:17 PM    RBC 0-5 02/04/2022 08:17 PM         Medications Reviewed:     Current Facility-Administered Medications   Medication Dose Route Frequency    sodium chloride (NS) flush 5-40 mL  5-40 mL IntraVENous Q8H    sodium chloride (NS) flush 5-40 mL  5-40 mL IntraVENous PRN    iron sucrose (VENOFER) 200 mg in 0.9% sodium chloride 100 mL IVPB  200 mg IntraVENous Q24H    [Held by provider] prasugreL (EFFIENT) tablet 10 mg  10 mg Oral DAILY    eplerenone (INSPRA) tablet tab 50 mg (Patient Supplied)  50 mg Oral DAILY    aspirin delayed-release tablet 81 mg  81 mg Oral DAILY    bumetanide (BUMEX) tablet 2 mg  2 mg Oral BID    hydrALAZINE (APRESOLINE) tablet 50 mg  50 mg Oral TID    isosorbide mononitrate ER (IMDUR) tablet 30 mg  30 mg Oral Q12H    milrinone (PRIMACOR) 20 MG/100 ML D5W infusion  0.375 mcg/kg/min IntraVENous CONTINUOUS    pantoprazole (PROTONIX) tablet 20 mg  20 mg Oral ACB    potassium chloride SR (KLOR-CON 10) tablet 40 mEq  40 mEq Oral BID    tamsulosin (FLOMAX) capsule 0.4 mg  0.4 mg Oral DAILY    rosuvastatin (CRESTOR) tablet 10 mg  10 mg Oral QHS    ranolazine ER (RANEXA) tablet 500 mg  500 mg Oral Q12H    glucose chewable tablet 16 g  4 Tablet Oral PRN    dextrose 10 % infusion 0-250 mL  0-250 mL IntraVENous PRN    glucagon (GLUCAGEN) injection 1 mg  1 mg IntraMUSCular PRN    insulin lispro (HUMALOG) injection   SubCUTAneous Q6H    sodium chloride (NS) flush 5-40 mL  5-40 mL IntraVENous Q8H    sodium chloride (NS) flush 5-40 mL  5-40 mL IntraVENous PRN    acetaminophen (TYLENOL) tablet 650 mg  650 mg Oral Q4H PRN     ______________________________________________________________________  EXPECTED LENGTH OF STAY: - - -  ACTUAL LENGTH OF STAY:          2                 Abdifatah Garland MD

## 2022-03-03 NOTE — PROGRESS NOTES
MARY: anticipate d/c home with [facility] [address] for chronic Milrinone infusion; Family transport; Will need IMM letter prior to d/c    RUR: 17%    -Hematology consulted  -AHF following, LVAD workup in progress  -GI following      -1230-CM discussed pt in rounds. CM noted HH orders for Milrinone infusion. CM uploaded [facility] orders to all scripts/careport for both [facility] as well as [facility]. Pt awaiting hematology consult. CM to follow.   Char East RN BSN CCM

## 2022-03-03 NOTE — PROGRESS NOTES
0800:  Preceptor Review of RN Work    3/3/2022  - Shift times - 1930 to 0800    The RN documentation of patient care for [address] [place] [address] has been reviewed and approved. All medications have been administered under the direct supervision of the preceptor. Marissa Alegria RN       Bedside and Verbal shift change report given to Jocelyn Stratton Rn  (oncoming nurse) by Tamiko Osborne  (offgoing nurse). Report included the following information SBAR, Kardex, Intake/Output, MAR, Recent Results, Med Rec Status and Cardiac Rhythm NSR.

## 2022-03-03 NOTE — ADT AUTH CERT NOTES
Attending and procedure notes for 3/2/22 by Karol Paulson RN       Review Status Review Entered   In Primary 3/2/2022 16:18      Criteria Review      ATTENDING>     Plan for EGD, colonoscopy today. Otherwise feels well no acute complaints     Assessment & Plan:     Acute on chronic systolic CHF NYHA IV  -LVAD pre-op eval   LVAD-DT evaluation was placed on hold due to discovery of lung nodule posittive for adenocarcinoma which required radiation therapy and ICD explant/reimplant; patient did not complete evaluation (EGD/C-scope needed)   - continue milrinone GTT, clear liquid diet and n.p.o. after midnight for EGD and colonoscopy planned for today    -appreciate AHF team recs   -continue eplerenone, potassium replacement, hydralazine, Bumex 2 mg p.o. twice daily, aspirin, Effient, Crestor     Coronary artery disease  Hypertension  Hyperlipidemia  -meds as above cont'd     Diabetes mellitus type 2 - SSI readjust prn      Constitutional:  No acute distress, cooperative, pleasant   ENT:     Oral mucosa moist, oropharynx benign. Resp:    CTA bilaterally. No wheezing/rhonchi/rales. No accessory muscle use  CV:       Regular rhythm, normal rate, no murmurs, gallops, rubs   GI:       Soft, non distended, non tender. normoactive bowel sounds, no hepatosplenomegaly    Musculoskeletal:         No edema, warm, 2+ pulses throughout   Neurologic:      Moves all extremities. AAOx3, CN II-XII reviewed        GI>      Endoscopic Gastroduodenoscopy Procedure Note     Indication: Iron deficiency anemia. Being planned for LVAD        Anesthesia/Sedation:  MAC anesthesia     Findings:   Esophagus:normal  Stomach: Small sliding hiatal hernia. Mild antral gastropathy with small erosions. Two very small nodules with central depression (4-6 mm) seen in the gastric antrum suspicious for pancreatic rests, along the greater curvature. Random gastric biopsies obtained and separate biopsies obtained from the nodules.    Duodenum/jejunum: normal     Therapies:  biopsy of stomach antrum, body, fundus and separately from gastric nodules.      Specimens:   ID         Type    Source Tests   Collected by    Time    Destination  1 : pathology   Preservative    Gastric              Vika Lozano MD     3/2/2022 1353 Pathology  2 : antrum        Preservative    Gastric              Vkia Lozano MD     3/2/2022 1354 Pathology              EBL: Minimal     Complications:  None; patient tolerated the procedure well. Impression:      -Mild gastropathy with erosions in the antrum.  -Small gastric nodules - suspicious for pancreatic rests  -Small hiatal hernia. -Otherwise negative exam.     Recommendations:  -Resume normal medications  -Acid suppression with a proton pump inhibitor.   -No Non-Steroidal Anti Inflammatorys (NSAIDS)   -Await pathology.   -Proceed with colonoscopy today              Colonoscopy Operative Report        Indications:  Iron deficiency anemia, being planned for LVAD     Referring Provider:   Edilson Tinoco MD     Sedation:  MAC anesthesia     Findings:   Rectum: Grade 1 internal hemorrhoid(s); Sigmoid: 1  Sessile polyp(s), the largest 5 mm in size;      - Diverticulosis  Descending Colon: normal  Transverse Colon: normal  Ascending Colon: normal  Cecum: 1  Sessile polyp(s), the largest 4 mm in size;   Terminal Ileum: not intubated     Interventions:  2 complete polypectomy were performed using cold snare  and the polyps were  retrieved     Specimen Removed:   ID         Type    Source Tests   Collected by    Time    Destination  3 : colon polyp Preservative                              Vika Lozano MD     3/2/2022 1416 Pathology        EBL:  Minimal     Complications:  None; patient tolerated the procedure well.     Impression:  -Two small (4-5 mm) polyps found in the cecum and sigmoid colon, removed and sent for pathology  -Mild sigmoid colon diverticulosis  -Small internal hemorrhoids     Recommendations: -Return patient to hospital floor for ongoing care. -Resume normal medication(s). -Begin fiber supplement daily   -High fiber diet. GERD diet: avoid fried and fatty foods. peppermint, chocolate, alcohol, coffee, citrus fruits and juices, tomoato products; avoid lying down for 2 to 3 hours after eating.     -Await pathology.  -No further colorectal cancer screening recommended considering age.    -

## 2022-03-04 VITALS
HEIGHT: 73 IN | SYSTOLIC BLOOD PRESSURE: 104 MMHG | RESPIRATION RATE: 18 BRPM | HEART RATE: 78 BPM | WEIGHT: 166.23 LBS | BODY MASS INDEX: 22.03 KG/M2 | TEMPERATURE: 98.3 F | OXYGEN SATURATION: 96 % | DIASTOLIC BLOOD PRESSURE: 75 MMHG

## 2022-03-04 PROBLEM — I50.9 CHF (CONGESTIVE HEART FAILURE) (HCC): Status: RESOLVED | Noted: 2022-02-01 | Resolved: 2022-03-04

## 2022-03-04 LAB
ALBUMIN SERPL-MCNC: 2.8 G/DL (ref 3.5–5)
ALBUMIN/GLOB SERPL: 0.8 {RATIO} (ref 1.1–2.2)
ALP SERPL-CCNC: 148 U/L (ref 45–117)
ALT SERPL-CCNC: 12 U/L (ref 12–78)
ANION GAP SERPL CALC-SCNC: 5 MMOL/L (ref 5–15)
AST SERPL-CCNC: 44 U/L (ref 15–37)
BASOPHILS # BLD: 0 K/UL (ref 0–0.1)
BASOPHILS NFR BLD: 1 % (ref 0–1)
BILIRUB SERPL-MCNC: 0.7 MG/DL (ref 0.2–1)
BNP SERPL-MCNC: 3615 PG/ML
BUN SERPL-MCNC: 14 MG/DL (ref 6–20)
BUN/CREAT SERPL: 13 (ref 12–20)
CALCIUM SERPL-MCNC: 8.4 MG/DL (ref 8.5–10.1)
CHLORIDE SERPL-SCNC: 103 MMOL/L (ref 97–108)
CO2 SERPL-SCNC: 28 MMOL/L (ref 21–32)
CREAT SERPL-MCNC: 1.05 MG/DL (ref 0.7–1.3)
DIFFERENTIAL METHOD BLD: ABNORMAL
EOSINOPHIL # BLD: 0.2 K/UL (ref 0–0.4)
EOSINOPHIL NFR BLD: 6 % (ref 0–7)
ERYTHROCYTE [DISTWIDTH] IN BLOOD BY AUTOMATED COUNT: 23.8 % (ref 11.5–14.5)
GLOBULIN SER CALC-MCNC: 3.5 G/DL (ref 2–4)
GLUCOSE BLD STRIP.AUTO-MCNC: 92 MG/DL (ref 65–117)
GLUCOSE SERPL-MCNC: 112 MG/DL (ref 65–100)
HCT VFR BLD AUTO: 33.7 % (ref 36.6–50.3)
HGB BLD-MCNC: 10.7 G/DL (ref 12.1–17)
IMM GRANULOCYTES # BLD AUTO: 0 K/UL (ref 0–0.04)
IMM GRANULOCYTES NFR BLD AUTO: 0 % (ref 0–0.5)
LYMPHOCYTES # BLD: 0.7 K/UL (ref 0.8–3.5)
LYMPHOCYTES NFR BLD: 27 % (ref 12–49)
Lab: NORMAL
MAGNESIUM SERPL-MCNC: 1.7 MG/DL (ref 1.6–2.4)
MCH RBC QN AUTO: 23.9 PG (ref 26–34)
MCHC RBC AUTO-ENTMCNC: 31.8 G/DL (ref 30–36.5)
MCV RBC AUTO: 75.4 FL (ref 80–99)
MONOCYTES # BLD: 0.3 K/UL (ref 0–1)
MONOCYTES NFR BLD: 13 % (ref 5–13)
NEUTS SEG # BLD: 1.3 K/UL (ref 1.8–8)
NEUTS SEG NFR BLD: 53 % (ref 32–75)
NRBC # BLD: 0 K/UL (ref 0–0.01)
NRBC BLD-RTO: 0 PER 100 WBC
PLATELET # BLD AUTO: 120 K/UL (ref 150–400)
PMV BLD AUTO: ABNORMAL FL (ref 8.9–12.9)
POTASSIUM SERPL-SCNC: 3.5 MMOL/L (ref 3.5–5.1)
PROT SERPL-MCNC: 6.3 G/DL (ref 6.4–8.2)
RBC # BLD AUTO: 4.47 M/UL (ref 4.1–5.7)
RBC MORPH BLD: ABNORMAL
REFERENCE LAB,REFLB: NORMAL
SERVICE CMNT-IMP: NORMAL
SODIUM SERPL-SCNC: 136 MMOL/L (ref 136–145)
TEST DESCRIPTION:,ATST: NORMAL
WBC # BLD AUTO: 2.5 K/UL (ref 4.1–11.1)

## 2022-03-04 PROCEDURE — 36592 COLLECT BLOOD FROM PICC: CPT

## 2022-03-04 PROCEDURE — 83735 ASSAY OF MAGNESIUM: CPT

## 2022-03-04 PROCEDURE — 74011250637 HC RX REV CODE- 250/637: Performed by: NURSE PRACTITIONER

## 2022-03-04 PROCEDURE — 82962 GLUCOSE BLOOD TEST: CPT

## 2022-03-04 PROCEDURE — 74011250636 HC RX REV CODE- 250/636: Performed by: NURSE PRACTITIONER

## 2022-03-04 PROCEDURE — 80053 COMPREHEN METABOLIC PANEL: CPT

## 2022-03-04 PROCEDURE — 83880 ASSAY OF NATRIURETIC PEPTIDE: CPT

## 2022-03-04 PROCEDURE — 85025 COMPLETE CBC W/AUTO DIFF WBC: CPT

## 2022-03-04 PROCEDURE — G0378 HOSPITAL OBSERVATION PER HR: HCPCS

## 2022-03-04 PROCEDURE — 74011000250 HC RX REV CODE- 250: Performed by: INTERNAL MEDICINE

## 2022-03-04 PROCEDURE — 74011000250 HC RX REV CODE- 250: Performed by: FAMILY MEDICINE

## 2022-03-04 PROCEDURE — 99215 OFFICE O/P EST HI 40 MIN: CPT | Performed by: INTERNAL MEDICINE

## 2022-03-04 PROCEDURE — 36415 COLL VENOUS BLD VENIPUNCTURE: CPT

## 2022-03-04 RX ADMIN — HYDRALAZINE HYDROCHLORIDE 50 MG: 50 TABLET, FILM COATED ORAL at 09:32

## 2022-03-04 RX ADMIN — BUMETANIDE 2 MG: 1 TABLET ORAL at 09:34

## 2022-03-04 RX ADMIN — SODIUM CHLORIDE, PRESERVATIVE FREE 10 ML: 5 INJECTION INTRAVENOUS at 06:24

## 2022-03-04 RX ADMIN — MILRINONE LACTATE IN DEXTROSE 0.38 MCG/KG/MIN: 200 INJECTION, SOLUTION INTRAVENOUS at 09:39

## 2022-03-04 RX ADMIN — PRASUGREL 10 MG: 10 TABLET, FILM COATED ORAL at 09:34

## 2022-03-04 RX ADMIN — ISOSORBIDE MONONITRATE 30 MG: 30 TABLET, EXTENDED RELEASE ORAL at 09:34

## 2022-03-04 RX ADMIN — RANOLAZINE 500 MG: 500 TABLET, FILM COATED, EXTENDED RELEASE ORAL at 09:39

## 2022-03-04 RX ADMIN — POTASSIUM CHLORIDE 40 MEQ: 750 TABLET, EXTENDED RELEASE ORAL at 09:33

## 2022-03-04 RX ADMIN — TAMSULOSIN HYDROCHLORIDE 0.4 MG: 0.4 CAPSULE ORAL at 09:32

## 2022-03-04 RX ADMIN — PANTOPRAZOLE SODIUM 20 MG: 20 TABLET, DELAYED RELEASE ORAL at 07:04

## 2022-03-04 RX ADMIN — EPLERENONE 50 MG: 50 TABLET, FILM COATED ORAL at 09:39

## 2022-03-04 RX ADMIN — SODIUM CHLORIDE, PRESERVATIVE FREE 10 ML: 5 INJECTION INTRAVENOUS at 06:25

## 2022-03-04 RX ADMIN — ASPIRIN 81 MG: 81 TABLET, COATED ORAL at 09:34

## 2022-03-04 NOTE — PROGRESS NOTES
Transitions of Care Plan   RUR: 18% - moderate   Clinical Update: stable for d/c on inotrope   Consults: [facility]   Baseline: independent without DME; resides w wife; chronic inotrope   Barriers to Discharge: none   Disposition: home health and home infusion    Estimated Discharge Date: 3/4/22    CM spoke with [facility] NP and attending MD. Patient medically stable for hospital discharge today. CM faxed updated inotrope order for patient to [facility]; spoke with RN Chelly Fong and provided verbal notice of patient discharge today with updated orders sent. [facility] remains for home heatlh services. 1215 - CM received update from [facility] - anticipate delivery/education for 1430 for patient at the bedside. Patient will need new bags and pumps due to medication/dose change. 1220 - CM spoke with patient and wife at bedside re disposition plan. Agreeable to discharge home with continued [facility] and infusion services. CM advised patient will have new pumps delivered. Patient's wife confirmed that she brought both of patient's home pumps to the bedside. CM updated [facility] that patient's home pumps are at the bedside. 1225 - CM notified [facility] of patient's anticipated discharge this afternoon. Received confirmation that SN services will resume after discharge. Medicare pt has received, reviewed, and signed 2nd IM letter informing them of their right to appeal the discharge. Signed copied has been placed on pt bedside chart.     Disposition:  Home Health: [facility]   Home Infusion: [facility]  Transportation: Wife

## 2022-03-04 NOTE — DISCHARGE SUMMARY
Discharge Summary       PATIENT ID: Ligia Lorenzo  MRN: [id number]   YOB: 1945    DATE OF ADMISSION: 3/1/2022  9:40 AM    DATE OF DISCHARGE: 3/4/22   PRIMARY CARE PROVIDER: López Patel MD     ATTENDING PHYSICIAN: Abelardo Rios  DISCHARGING PROVIDER: Ray Méndez MD    To contact this individual call [phone] and ask the  to page. If unavailable ask to be transferred the Adult Hospitalist Department. CONSULTATIONS: IP CONSULT TO GASTROENTEROLOGY  IP CONSULT TO HEMATOLOGY    PROCEDURES/SURGERIES: Procedure(s):  ESOPHAGOGASTRODUODENOSCOPY (EGD)  COLONOSCOPY    :-  ESOPHAGOGASTRODUODENAL (EGD) BIOPSY  ENDOSCOPIC POLYPECTOMY    DISCHARGE DIAGNOSES: CHF NYHA IV at discharge    HPI: \"Heron Oconnell Sr is a 69 y. o. male who presents with heart failure     Marlyn Bridges Sr is a 69 y. o. male with h/o HTN, HL, DM2, SRUTHI on CPAP, chronic systolic heart failure, stage D, NYHA class IV symptoms, non-ischemic cardiomyopathy, LVEF 20% with LVEDD 6.2, RV dysfunciton, TAPSE 1.22, TBili 1.5 likely from cardiac congestion, recent cardiac arrest s/p ICD (1/2013, [employer] followed by [facility]), coronary artery disease s/p multiple interventions s/p 4V CABG (8/2012), C (7/2019) severe stenosis of LIMA to LAD anastomosis site, SVG graft to OM is occluded, SVG graft to RCA 40-50%, LAD occluded proximally, severe proximal LCx, RCA is the long . PET (6/2019) EF 24% with anterior lateral and inferior reversible defect. Anemia, microcytic with iron deficiency, DVT with filter. [facility] by Dr. Rafiq Nguyen for evaluation of his candidacy for advanced therapies.     Patient agreed to inpatient initiation of palliative infusion of chronic inotropes and evaluation for LVAD-DT. Patient was admitted 5/2-5/25/21. Patient was started on milrinone infusion and had first part of LVAD evaluation completed.  Right and left heart cath on 5/24/21 that showed severe diffuse native vessel CAD, with patent grafts (LIMA to D2, SVG to RCA).  Antiplatelet therapy was held during hospitalization and after discharge due to anticipated pulmonary nodule biopsy, bone marrow biopsy and EGD/C-Scope as part of LVAD workup. Patient was readmitted 5/26-5/28/21 with hypertension, headache and chest pain, his troponin peaked at 10; he was shortly bridged with heparin, otherwise had normal EKG and chest CT negative for PE. Cardiology and AHF service was consulted and patient was discharged home after troponin came down.      Patient has now completed radiation treatment for spot in the lungs. Hem-onc has cleared patient. Patient will follow up with his pulmonary MD in 1-2 months. He is agreeable to proceed/complete with LVAD evaluation. He was direct-admitted today 3/1/2022 to complete GI scopes inpatient. \"      [address] [facility],[address] COURSE:   Patient was admitted for preop evaluation for possible LVAD patient had EGD colonoscopy and seen by hematologist as well for MGUS work-up completed patient discharged home follow-up appointment with advanced heart failure team for possible LVAD operation later in April continue milrinone GTT at discharge follow-up with advanced heart failure team as an outpatient    [address] [facility] [address] / PLAN:      D/C HOME    BMI: Body mass index is 21.93 kg/m². . This patient: Has a BMI within normal limits. PENDING TEST RESULTS:   At the time of discharge the following test results are still pending:      ADDITIONAL CARE RECOMMENDATIONS:        NOTIFY YOUR PHYSICIAN FOR ANY OF THE FOLLOWING:   Fever over 101 degrees for 24 hours. Chest pain, shortness of breath, fever, chills, nausea, vomiting, diarrhea, change in mentation, falling, weakness, bleeding. Severe pain or pain not relieved by medications, as well as any other signs or symptoms that you may have questions about.     FOLLOW UP APPOINTMENTS:    Follow-up Information     Follow up With Specialties Details Why [address] [facility]  [address]  [phone]    [facility]   FOR MILRINONE INFUSION Oanh ornelas  [phone]    [address] [place] [address] Cardiology On 3/8/2022  [address]    Helena Medley MD Internal Medicine In 2 weeks  [address] [facility]  [address] [address] [address] 35 [phone]               DIET: Cardiac Diet    ACTIVITY: Activity as tolerated    EQUIPMENT needed:     DISCHARGE MEDICATIONS:  Current Discharge Medication List      CONTINUE these medications which have CHANGED    Details   milrinone (PRIMACOR) 20 mg/100 mL (200 mcg/mL) infusion 28.5 mcg/min by IntraVENous route continuous. Qty: 500 mL, Refills: 1  Start date: 3/3/2022         CONTINUE these medications which have NOT CHANGED    Details   tamsulosin (FLOMAX) 0.4 mg capsule TAKE 1 CAPSULE DAILY  Qty: 90 Capsule, Refills: 3  Start date: 3/2/2022    Associated Diagnoses: Benign prostatic hyperplasia without lower urinary tract symptoms      benzonatate (TESSALON) 200 mg capsule Take 200 mg by mouth three (3) times daily as needed for Cough. bumetanide (BUMEX) 2 mg tablet Take 1 Tablet by mouth two (2) times a day. Qty: 90 Tablet, Refills: 1      potassium chloride SR (K-TAB) 20 mEq tablet Take 2 Tablets by mouth two (2) times a day. Qty: 120 Tablet, Refills: 2      prasugreL (Effient) 10 mg tablet Take 10 mg by mouth daily. metFORMIN (GLUCOPHAGE) 500 mg tablet Take 1 Tablet by mouth two (2) times daily (with meals). Qty: 180 Tablet, Refills: 3      ranolazine ER (Ranexa) 500 mg SR tablet Take 1 Tablet by mouth two (2) times a day. Qty: 180 Tablet, Refills: 3      eplerenone (INSPRA) 50 mg tab tablet Take 1 Tablet by mouth daily.   Qty: 90 Tablet, Refills: 3    Associated Diagnoses: Chronic systolic heart failure (HCC)      isosorbide mononitrate ER (IMDUR) 30 mg tablet Take 1 Tablet by mouth every twelve (12) hours. Qty: 180 Tablet, Refills: 1      hydrALAZINE (APRESOLINE) 50 mg tablet TAKE 1 TABLET BY MOUTH THREE TIMES DAILY  Qty: 180 Tablet, Refills: 2      dapagliflozin (Farxiga) 10 mg tab tablet Take 1 Tablet by mouth daily. Qty: 90 Tablet, Refills: 1      diphenhydrAMINE (Benadryl Allergy) 25 mg tablet Take 25 mg by mouth every six (6) hours as needed. Needed every night d/t picc line causing itching      glucose blood VI test strips (OneTouch Ultra Test) strip USE TO TEST BLOOD SUGAR DAILY  Qty: 100 Strip, Refills: 3    Associated Diagnoses: Type 2 diabetes, controlled, with peripheral circulatory disorder (HCC)      !! OneTouch Delica Plus Lancet 33 gauge misc USE TO TEST BLOOD SUGAR DAILY  Qty: 100 Lancet, Refills: 3      omeprazole (PRILOSEC) 20 mg capsule TAKE 1 CAPSULE BY MOUTH DAILY FOR INDIGESTION  Qty: 30 Capsule, Refills: 1    Associated Diagnoses: Nausea      rosuvastatin (CRESTOR) 10 mg tablet TAKE 1 TABLET NIGHTLY  Qty: 90 Tab, Refills: 3      !! lancets misc Use to test blood sugar daily  Qty: 100 Each, Refills: 11    Associated Diagnoses: Type 2 diabetes, controlled, with peripheral circulatory disorder (HCC)      acetaminophen (TYLENOL EXTRA STRENGTH) 500 mg tablet Take  by mouth every six (6) hours as needed. aspirin delayed-release 81 mg tablet Take 81 mg by mouth daily. !! - Potential duplicate medications found. Please discuss with provider.           DISPOSITION:  X  Home With:   OT  PT  HH  RN       Long term SNF/Inpatient Rehab    Independent/assisted living    Hospice    Other:       PATIENT CONDITION AT DISCHARGE:     Functional status   X Poor     Deconditioned     Independent      Cognition   X  Lucid     Forgetful     Dementia      Catheters/lines (plus indication)    Giles     PICC     PEG    X None      Code status    X Full code     DNR      PHYSICAL EXAMINATION AT DISCHARGE:  General:          Alert, cooperative, no distress, appears stated age. HEENT:           Atraumatic, anicteric sclerae, pink conjunctivae                          No oral ulcers, mucosa moist, throat clear, dentition fair  Neck:               Supple, symmetrical  Lungs:             Clear to auscultation bilaterally. No Wheezing or Rhonchi. No rales. Chest wall:      No tenderness  No Accessory muscle use. Heart:              Regular  rhythm,  No  murmur   No edema  Abdomen:        Soft, non-tender. Not distended. Bowel sounds normal  Extremities:     No cyanosis. No clubbing,                            Skin turgor normal, Capillary refill normal  Skin:                Not pale. Not Jaundiced  No rashes   Psych:             Not anxious or agitated.   Neurologic:      Alert, moves all extremities, answers questions appropriately and responds to commands       CHRONIC MEDICAL DIAGNOSES:  Problem List as of 3/4/2022 Date Reviewed: 2/24/2022          Codes Class Noted - Resolved    NSTEMI (non-ST elevated myocardial infarction) [facility]) ICD-10-CM: I21.4  ICD-9-CM: 410.70  5/27/2021 - Present        Acute on chronic clinical systolic heart failure ([facility] ICD-10-CM: I50.23  ICD-9-CM: 428.23  5/20/2021 - Present        Active advance directive on file ICD-10-CM: Z78.9  ICD-9-CM: V49.89  3/7/2017 - Present        Type 2 diabetes, controlled, with peripheral circulatory disorder ([facility] ICD-10-CM: E11.51  ICD-9-CM: 250.70, 443.81  2/10/2016 - Present        CHF, stage C ([facility] ICD-10-CM: I50.9  ICD-9-CM: 428.0  8/6/2015 - Present        Mixed hyperlipidemia ICD-10-CM: E78.2  ICD-9-CM: 272.2  8/6/2015 - Present        Proteinuria ICD-10-CM: R80.9  ICD-9-CM: 791.0  6/19/2014 - Present        BPH without obstruction/lower urinary tract symptoms ICD-10-CM: N40.0  ICD-9-CM: 600.00  6/11/2014 - Present        Hematuria, gross ICD-10-CM: R31.0  ICD-9-CM: 599.71  2/19/2013 - Present        Cardiac arrest ([facility] ICD-10-CM: I46.9  ICD-9-CM: 427.5  2/4/2013 - Present        Insomnia, unspecified ICD-10-CM: G47.00  ICD-9-CM: 780.52  7/23/2012 - Present        Encounter for long-term (current) use of other medications ICD-10-CM: Z79.899  ICD-9-CM: V58.69  3/26/2012 - Present        Calculus of kidney ICD-10-CM: N20.0  ICD-9-CM: 592.0  3/22/2010 - Present        Coronary atherosclerosis of native coronary artery ICD-10-CM: I25.10  ICD-9-CM: 414.01  3/22/2010 - Present        Benign neoplasm of colon ICD-10-CM: D12.6  ICD-9-CM: 211.3  3/22/2010 - Present        Unspecified sleep apnea ICD-10-CM: G47.30  ICD-9-CM: 780.57  3/22/2010 - Present        Reflux esophagitis ICD-10-CM: K21.00  ICD-9-CM: 530.11  3/22/2010 - Present        RESOLVED: CHF (congestive heart failure) (HCC) ICD-10-CM: I50.9  ICD-9-CM: 428.0  2/1/2022 - 3/4/2022        RESOLVED: Severe protein-calorie malnutrition ([facility] ICD-10-CM: E43  ICD-9-CM: 262  5/21/2021 - 1/11/2022        RESOLVED: Type II or unspecified type diabetes mellitus without mention of complication, not stated as uncontrolled ICD-10-CM: E11.9  ICD-9-CM: 250.00  9/2/2014 - 2/10/2016        RESOLVED: Long term (current) use of anticoagulants ICD-10-CM: Z79.01  ICD-9-CM: V58.61  2/23/2011 - 12/5/2012        RESOLVED: Congestive heart failure, unspecified ICD-10-CM: I50.9  ICD-9-CM: 428.0  3/22/2010 - 8/6/2015        RESOLVED: Other and unspecified hyperlipidemia ICD-10-CM: E78.5  ICD-9-CM: 272.4  3/22/2010 - 8/6/2015        RESOLVED: DM w/ Complication DDI-69-FC: Z06.6  ICD-9-CM: 250.90  3/22/2010 - 9/2/2014              Greater than 30  minutes were spent with the patient on counseling and coordination of care    Signed:   Mike Ang MD  3/4/2022  9:36 AM

## 2022-03-04 NOTE — CONSULTS
[address]    Name:  Monica Swartz  MR#:  [id number]  :  1945  ACCOUNT #:  [de-identified]  DATE OF SERVICE:  2022    HEMATOLOGY CONSULTATION    REASON FOR ADMISSION:  Evaluation for LVAD. HISTORY OF PRESENT ILLNESS:  The patient is a very charming 27-year-old man. Unfortunately, he has severe ischemic cardiomyopathy and was admitted to the hospital for evaluation for LVAD. He is on home chronic inotrope treatment. Our service was asked to see to assess his candidacy for LVAD given two previous conditions of monoclonal gammopathy and plasma cell dyscrasia, but also adenocarcinoma of the left upper lobe treated by stereotactic radiosurgery. Currently, he is awake and alert. He has no complaints. Denies any pedal edema. He is followed closely in our clinic by Dr. Tressa Negron, who follows him for MGUS. Last year, he underwent a bone marrow biopsy on 2021 that showed only 5% plasma cells without definite evidence of a clonal population. Most recent serum protein electrophoresis was done in our office on 2021, showed an SPEP of 0.4 g/dL. At that time, his creatinine was normal.  His serum free light chain ratio was normal.  On the same day last year, he had biopsy of a left upper lobe lung nodule. This was submitted to Pathology under accession number [id number], and was read out as adenocarcinoma. Additional stains revealed this to be PD-L1 0. He had a CT PET scan done on 2021 that showed that this was FDG avid at 3.8, but showed no jose l foci of abnormal hypermetabolism, making this a clinical T1b N0 lesion. For that reason, he was referred to Radiation Oncology for curative intent stereotactic radiosurgery, which he completed 2021 with 50 Gy in 5 fractions. He is a never smoker. He denies any current hemoptysis or chest pain.     PAST MEDICAL HISTORY:  Coronary artery disease as above, status post CABG, first heart attack at 41; type 2 diabetes; gastroenteritis; pneumonia; previous dysrhythmia, status post defibrillator; cataract removal.    ALLERGIES:  OXYCODONE. CURRENT MEDICATIONS IN THE HOSPITAL:  1. Aspirin 81 mg daily. 2.  Bumex 2 mg twice daily. 3.  Inspra 50 mg once daily. 4.  Hydralazine 50 mg three times daily. 5.  Humalog insulin sliding scale. 6.  Venofer 200 mg IV, two doses given yesterday and today. 7.  Imdur 30 mg q.12 h.  8.  Protonix 20 mg once daily. 9.  Klor-Con 40 mEq two times daily. 10.  Prasugrel 10 mg once daily. 11.  Ranexa 500 mg q.12 h.  12.  Crestor 10 mg at bedtime. 13.  Flomax 0.4 mg daily. SOCIAL HISTORY:  He is a never smoker. He was previously in [place]. He is . He works as a iraheta. FAMILY HISTORY:  Father had coronary artery disease. Mother  at 72 from, what sounds like, peripheral vascular disease. REVIEW OF SYSTEMS:  GENERAL:  No fevers or chills. HEENT:  No auditory or visual change. LYMPHATIC:  No lumps or bumps in neck, underarm, or groin. CARDIOVASCULAR:  No chest pain or palpitations. PULMONARY:  No cough or shortness of breath. GASTROINTESTINAL:  No abdominal pain, diarrhea, or constipation. GENITOURINARY:  No dysuria or incontinence. SKIN:  No rash or changing mole. MUSCULOSKELETAL:  No red or swollen joints. NEUROLOGIC:  No irritability or syncope. PHYSICAL EXAMINATION:  VITAL SIGNS:  BP 92/63, pulse 79, temperature 98.2, 95% on room air. GENERAL:  Pleasant, in no acute distress. HEENT:  Sclerae anicteric. Oropharynx clear. NECK:  Supple without lymphadenopathy or thyromegaly. HEART:  Regular rate and rhythm without murmur, rub, or gallop. LUNGS:  Clear to auscultation bilaterally. ABDOMEN:  Nontender and nondistended. Normoactive bowel sounds. No hepatosplenomegaly. EXTREMITIES:  No clubbing, cyanosis, or edema. PSYCHIATRIC:  Normal mood and affect. Alert and oriented x3.     ASSESSMENT AND PLAN:  A 52-year-old man with severe ischemic cardiomyopathy, on home inotropes, admitted for left ventricular assist device assessment, asked to see for Hem-Onc clearance, diagnosed with monoclonal gammopathy of undetermined significance, and had a bone marrow biopsy last summer showing 5% plasma cells without evidence of clonality. He was also found to have FDG-avid left upper lobe pulmonary adenocarcinoma, status post stereotactic radiosurgery. 1.  Monoclonal gammopathy of undetermined significance: This is a precancerous condition and only progresses to multiple myeloma in 1% of patients per year. I feel it is reasonable for him to proceed with left ventricular assist device with his precancerous condition. 2.  Clinical T1b N0 left upper lobe pulmonary adenocarcinoma, status post stereotactic radiosurgery, completed 09/2021:    for T1 and T2 regions, treated with stereotactic radiosurgery exceeds 90%. Therefore, I feel it is reasonable for him to proceed with left ventricular assist device given it is likely he is cured from this and as a never smoker, he does not have the same high risk for a second malignancy as a long-term smoker would. Thank you for consult. We will sign off.       MD OLEGARIO Jimenez/CHANTELL_DELTA_I/BC_BRE  D:  03/03/2022 17:33  T:  03/03/2022 20:20  JOB #:  [id number]  CC:  Kathy Daley MD

## 2022-03-04 NOTE — PROGRESS NOTES
Physician Progress Note      PATIENT:               Hyun Orantes  CSN #:                  [id number]  :                       1945  ADMIT DATE:       3/1/2022 9:40 AM  DISCH DATE:  RESPONDING  PROVIDER #:        eJanette Robert MD          QUERY TEXT:    Pt noted to also have Systolic CHF, with hx of HTN, CAD, NICM, with transvalvular regurgitation on Echo. If possible, please document in progress notes and discharge summary the etiology of CHF, if able to be determined. The medical record reflects the following:  Risk Factors: EF 10-15%, transvavular regurgitation, with hx of HTN, CM, and CAD. Clinical Indicators: admitted for initiation of Inotropes and evaluation for LVAD. Pt with Systolic CHF, with hx of CAD/ CM/ HTN, and noted to have transvalvular regurgitation on Echo. BNP 3442  Treatment: Milrinone Drip, Bumex po, Eplerenone po, I/O, qd weight, telemetry, Cardiology consult.     Thank you,  Sherl Moritz RN  Clinical Documentation  [phone]  Options provided:  -- CHF due to Hypertensive Heart Disease  -- CHF due to Hypertensive Heart Disease and CAD  -- CHF not due to Hypertension but due to CAD  -- CHF due to Hypertensive Heart Disease and CM  -- CHF not due to Hypertension but due to CM  -- CHF due to Hypertensive Heart Disease and Valvular Heart Disease  -- CHF not due to Hypertension but due to valvular heart disease  -- CHF due to HTN, CAD, ICMP and valvular disease  -- Other - I will add my own diagnosis  -- Disagree - Not applicable / Not valid  -- Disagree - Clinically unable to determine / Unknown  -- Refer to Clinical Documentation Reviewer    PROVIDER RESPONSE TEXT:    See notes and ask cardio if needed    Query created by: Radha Perla on 3/3/2022 4:23 PM      Electronically signed by:  Jeanette Robert MD 3/4/2022 9:07 AM

## 2022-03-04 NOTE — DISCHARGE INSTRUCTIONS
Discharge Instructions       PATIENT ID: Marylou Sarabia  MRN: [id number]   YOB: 1945    DATE OF ADMISSION: 3/1/2022  9:40 AM    DATE OF DISCHARGE: 3/4/2022    PRIMARY CARE PROVIDER: Nancy Gann MD     ATTENDING PHYSICIAN: Alex Pablo MD  DISCHARGING PROVIDER: Emilia Francis MD    To contact this individual call [phone] and ask the  to page. If unavailable ask to be transferred the Adult Hospitalist Department. DISCHARGE DIAGNOSES CHF NYHA 4 LVAD w/u     CONSULTATIONS: IP CONSULT TO GASTROENTEROLOGY  IP CONSULT TO HEMATOLOGY    PROCEDURES/SURGERIES: Procedure(s):  ESOPHAGOGASTRODUODENOSCOPY (EGD)  COLONOSCOPY    :-  ESOPHAGOGASTRODUODENAL (EGD) BIOPSY  ENDOSCOPIC POLYPECTOMY    PENDING TEST RESULTS:   At the time of discharge the following test results are still pending:     FOLLOW UP APPOINTMENTS:   Follow-up Information     Follow up With Specialties Details Why [address] Cardiology On 3/8/2022  [address] [place] [address]    Nancy Gann MD Internal Medicine In 2 weeks  [address] [facility]  [address] [address] [facility] 77 [phone]             ADDITIONAL CARE RECOMMENDATIONS:     DIET: Cardiac Diet    ACTIVITY: Activity as tolerated    WOUND CARE:     EQUIPMENT needed:       Radiology      DISCHARGE MEDICATIONS:   See Medication Reconciliation Form    · It is important that you take the medication exactly as they are prescribed. · Keep your medication in the bottles provided by the pharmacist and keep a list of the medication names, dosages, and times to be taken in your wallet. · Do not take other medications without consulting your doctor.        NOTIFY YOUR PHYSICIAN FOR ANY OF THE FOLLOWING:   Fever over 101 degrees for 24 hours. Chest pain, shortness of breath, fever, chills, nausea, vomiting, diarrhea, change in mentation, falling, weakness, bleeding. Severe pain or pain not relieved by medications. Or, any other signs or symptoms that you may have questions about.       DISPOSITION:  x  Home With:   OT  PT  HH  RN       SNF/Inpatient Rehab/LTAC    Independent/assisted living    Hospice    Other:     CDMP Checked:   Yes x     PROBLEM LIST Updated:  Yes x       Signed:   Lennox Nevins, MD  3/4/2022  9:09 AM

## 2022-03-04 NOTE — PROGRESS NOTES
[address] [facility] in [place], [place]  Inpatient Progress Note      Patient name: Liliana Saldivar  Patient : 1945  Patient MRN: [id number]  Consulting MD: Romina Rossi MD  Date of service: 22    REASON FOR REFERRAL:  Management of chronic systolic heart failure    PLAN OF CARE:  · 68 y/o male with h/o severe ischemic cardiomyopathy, LVEF 20-25%; stage D, NYHA class IV symptoms on chronic inotropic support with milrinone gtt; patient unerwent LVAD-DT evaluation which was placed on hold due to discovery of lung nodule posittive for adenocarcinoma which required radiation therapy and ICD explant/reimplant; patient did not complete evaluation (EGD/C-scope needed) as he has not been decided if he would like to proceed with LVAD. · Patient was a direct admit 3/1/2022 to complete LVAD evaluation including GI workup  · Outstanding items for evaluation include podiatry (scheduled as OP) and updated psychosocial evaluation   · Tentative plans to implant 4/5 pending approval during MRB on 3/11     RECOMMENDATIONS/PLAN:  Continue current medical therapy for heart failure  Continue milrinone 0.375 mcg/kg/min   Scopes complete 3/2, path pending   Intolerant to BB to RV dysfunction   ACEi/ARB/ARNi previously discontinued in anticipation of possible cardiac surgery  Continue current dose of eplerenone 50 mg daily  Continue Potassium 40 mEq po BID  Will restart Farxiga 10 mg daily OP   Continue current dose of hydralazine 50 TID and Imdur 30 mg twice daily  Continue Bumex 2 mg po BID.    Continue ASA 81 mg daily   Resume Effient 10 mg po daily   Continue current dose of rosuvastatin 10 mg daily  Continue ranolazine 500 mg BID (for HR and angina control)  Ordered CPAP therapy - stated uses most of the time at home; patient declined to use in the hospital   Advanced care plan forms completed /filed  Consulted Diabetes management; appreciate recs  Genetic test negative Reinforced heart healthy, low salt diet - once patient is done with his procedure and ok per GI  Reinforced appropriate fluid intake of 6 x 8oz glasses of water per day  Monitor and record daily standing weights and strict I/O's  OK for D/C     Rest per primary team    LIFE GOALS:  Patient's personal goals include: complete procedures for LVAD evaluation  Important upcoming milestones or family events: nothing at this time  The patient identifies the following as important for living well: be more active, do activities w/o SOB    IMPRESSION:  Fatigue at rest  Shortness of breath with exertion  Acute on chronic systolic heart failure  · Stage D, NYHA class IV symptoms improved to class II on inotropes  · Non-ischemic cardiomyopathy, LVEF 20% with LVEDD 6.2  · RV dysfunction, TAPSE 1.22 (prelimnary read)  · TBili 1.5 likely from cardiac congestion  At risk of sudden cardiac death  · Recent cardiac arrest   · S/p ICD (1/2013, [employer] followed by [employer]); New implant on 10/21/2021 [employer]  Coronary artery disease  · S/p multiple interventions  · S/p 4V CABG (8/2012)  · LHC (7/2019) severe stenosis of LIMA to LAD anastomosis site, SVG graft to OM is occluded, SVG graft to RCA 40-50%, LAD occluded proximally, severe proximal LCx, RCA is the long . · PET (6/2019) EF 24% with anterior lateral and inferior reversible defect  Cardiac risk factors  · HTN  · HL  · DM2  · SRUTHI on CPAP  · MIld carotid stenosis  Anemia, microcytic  · Iron deficiency  DVT with filter  Pulmonary nodules   Dysphagia        CARDIAC IMAGING:  ECHO ( 2/2/22):    Left Ventricle: Left ventricle is moderately dilated. Mildly increased wall thickness. Severe global hypokinesis present. Severely reduced left ventricular systolic function with a visually estimated EF of 15 - 20%. Grade II diastolic dysfunction with increased LAP.   Right Ventricle: Right ventricle is mildly dilated. Mildly reduced systolic function.     Aortic Valve: Mild sclerosis of the aortic valve cusps. Mild transvalvular regurgitation.   Mitral Valve: Mild transvalvular regurgitation.   Tricuspid Valve: Moderate transvalvular regurgitation. Mildly elevated RVSP. RVSP is 39 mmHg.   Left Atrium: Left atrium is moderately dilated.        Echo (11/23/21):  · LV 15-20%.    · Mod-severe, MR, mod-TR     Echo (5/20/21)  · LV: Estimated LVEF is 20 - 25%. Normal wall thickness. Mildly dilated left ventricle. Severely and globally reduced systolic function. Severe (grade 4) left ventricular diastolic dysfunction. · LA: Severely dilated left atrium. · RA: Severely dilated right atrium. · MV: Moderate mitral valve regurgitation is present. · TV: Moderate tricuspid valve regurgitation is present. · AO: Mild aortic root dilatation. · RV: Pacer/ICD present. · LVED 6.2cm     EKG (5/20/21) SB with 1degree AV block, QRS 116ms     LHC (5/24/21) Native Coronaries: LM - moderate to severe distal disease 50%. % prox occluded (), heavily calcified, LCx: 80% proximal stenosis. OM1 is  (seen filling faintly via collaterals). OM2 99% stenosis. RCA: 100% proximal occluded.  LIMA to LAD: patent, supplies only a diagonal branch (probably D2).  The LAD distal to the bifurcation is 100% occluded () and fills via right to left collaterals off the SVG to RCA. SVG to R-PDA: patent with moderate diffuse irregularities but no obstructive disease in the graft.    No appealing interventional targets.      NST as above     ICD Interrogation (11/12/2021) [employer] VVI, thoracic impedence trending up, no events, normal device function and good battery  ICD interrogation (5/12/21) [employer] single lead, no events, normal device function and good battery     HEMODYNAMICS:  RHC 5/24/21 CI 1.97, PA 33/9/17, RA 1, PCWP 6- off milrinone   CPEST not done     Patient underwent a 6 minute walk test 11/12/2021     6 Min Walk Report 11/12/2021 7/6/2021 5/20/2021 (PRE) HR 88 98 74   (PRE) O2 Sat 100 - 99   (POST)  - 81   (POST) O2 Sat 99 98% on Ra 99   Distance in Meters 386.58 - 1158.24          OTHER IMAGING:  CXR (1/24/22): FINDINGS: PA and lateral radiographs of the chest demonstrate a new infiltrate  in the apical segment of the left lower lobe. The cardiac and mediastinal  contours and pulmonary vascularity are stable.  The bones and soft tissues are  within normal limits. IMPRESSION: Left lower lobe pneumonia     CXR (6/17/21) clear     Duplex LE venous Bilal (2/2/2021)  Right Lower Venous  Varicose vein(s) present. Varicose veins are patent. The common femoral, great saphenous, profunda femoral, femoral, popliteal, gastrocnemius, posterior tibial, peroneal and saphenous femoral junction vein(s) were imaged in the transverse and longitudinal planes. The vessels showed normal color filling and compressibility. Doppler interrogation of the veins showed phasic and spontaneous flow.      Left Lower Venous  Varicose vein(s) present. Varicose veins are patent. The common femoral, great saphenous, saphenous femoral junction, profunda femoral, femoral, popliteal, gastrocnemius, posterior tibial and peroneal vein(s) were imaged in the transverse and longitudinal planes. The vessels showed normal color filling and compressibility. Doppler interrogation of the veins showed phasic and spontaneous flow.         CT chest (2/1/22)  1. No pulmonary emboli. 2. Pleural effusions. 3. Treated RAVEN carcinoma as discussed.     CT 5/21/21 1. Irregular pulmonary nodule in the left upper lobe measuring 2 cm, suspicious for primary pulmonary malignancy. 2. Additional 6 mm left upper lobe pulmonary nodule. 3. Severe calcific atherosclerosis of the coronary arteries and abdominal aorta. No aneurysm. 4. Cardiomegaly.  5. No acute abnormality within the chest, abdomen, or pelvis.       HPI:   Briefly, Bess Cleveland is a 69 y.o. male with h/o HTN, HL, DM2, SRUTHI on CPAP, chronic systolic heart failure, stage D, NYHA class IV symptoms, non-ischemic cardiomyopathy, LVEF 20% with LVEDD 6.2, RV dysfunciton, TAPSE 1.22, TBili 1.5 likely from cardiac congestion, recent cardiac arrest s/p ICD (1/2013, [employer] followed by [address] [place] [address]), coronary artery disease s/p multiple interventions s/p 4V CABG (8/2012), LHC (7/2019) severe stenosis of LIMA to LAD anastomosis site, SVG graft to OM is occluded, SVG graft to RCA 40-50%, LAD occluded proximally, severe proximal LCx, RCA is the long . PET (6/2019) EF 24% with anterior lateral and inferior reversible defect. Anemia, microcytic with iron deficiency, DVT with filter. Patient was referred to [facility] by Dr. Jeremy Driscoll for evaluation of his candidacy for advanced therapies.     Patient agreed to inpatient initiation of palliative infusion of chronic inotropes and evaluation for LVAD-DT. Patient was admitted 5/2-5/25/21. Patient was started on milrinone infusion and had first part of LVAD evaluation completed. Right and left heart cath on 5/24/21 that showed severe diffuse native vessel CAD, with patent grafts (LIMA to D2, SVG to RCA).  Antiplatelet therapy was held during hospitalization and after discharge due to anticipated pulmonary nodule biopsy, bone marrow biopsy and EGD/C-Scope as part of LVAD workup. Patient was readmitted 5/26-5/28/21 with hypertension, headache and chest pain, his troponin peaked at 10; he was shortly bridged with heparin, otherwise had normal EKG and chest CT negative for PE. Cardiology and AHF service was consulted and patient was discharged home after troponin came down.      Patient has now completed radiation treatment for spot in the lungs. Hem-onc has cleared patient. Patient will follow up with his pulmonary MD in 1-2 months. He is agreeable to proceed/complete with LVAD evaluation. He was direct-admitted today 3/1/2022 to complete GI scopes inpatient.     INTERVAL HISTORY:  Patient awake, resting in bed, no acute distress  Scopes complete  VSS       PHYSICAL EXAM:  Visit Vitals  /75   Pulse 78   Temp 98.3 °F (36.8 °C)   Resp 18   Ht 6' 1\" (1.854 m)   Wt 166 lb 3.6 oz (75.4 kg)   SpO2 96%   BMI 21.93 kg/m²     General: Patient is well developed, well-nourished in no acute distress, resting in bed  HEENT: Normocephalic and atraumatic. No scleral icterus. Pupils are equal, round and reactive to light and accomodation. No conjunctival injection. Oropharynx is clear. Neck: Supple. No evidence of thyroid enlargements or lymphadenopathy. JVD: Cannot be appreciated   Lungs: Breath sounds are equal and clear bilaterally. No wheezes, rhonchi, or rales. Heart: Regular rate and rhythm with normal S1 and S2. No murmurs, gallops or rubs. Abdomen: Soft, no mass or tenderness. No organomegaly or hernia. Bowel sounds present. Genitourinary and rectal: deferred  Extremities: No cyanosis, clubbing, or edema. Neurologic: No focal sensory or motor deficits are noted. Grossly intact. Psychiatric: Awake, alert an doriented x 3. Appropriate mood and affect. Skin: Warm, dry and well perfused. No lesions, nodules or rashes are noted. REVIEW OF SYSTEMS:  General: Denies fever, night sweats. Ear, nose and throat: Denies difficulty hearing, sinus problems, runny nose, post-nasal drip, ringing in ears, mouth sores, loose teeth, ear pain, nosebleeds, sore throate, facial pain or numbess  Cardiovascular: see above in the interval history  Respiratory: Denies cough, wheezing, sputum production, hemoptysis.   Gastrointestinal: Denies heartburn, constipation, intolerance to certain foods, diarrhea, abdominal pain, nausea, vomiting, difficulty swallowing, blood in stool  Kidney and bladder: Denies painful urination, frequent urination, urgency, prostate problems and impotence  Musculoskeletal: Denies joint pain, muscle weakness  Skin and hair: Denies change in existing skin lesions, hair loss or increase, breast changes    PAST MEDICAL HISTORY:  Past Medical History:   Diagnosis Date    CAD (coronary artery disease) '90 '97, '13 x 2    MI, code ice in 2013 at [facility]    Calculus of kidney     Colonic polyps     Congestive heart failure, unspecified     Diabetes ([facility] [address]     Gastritis     Hypercholesterolemia     Sleep apnea        PAST SURGICAL HISTORY:  Past Surgical History:   Procedure Laterality Date    COLONOSCOPY  04/06/2011    16, due 21    COLONOSCOPY N/A 3/2/2022    COLONOSCOPY    :- performed [facility] Gro[facility] at [facility] ENDOSCOPY    ENDOSCOPY, COLON, DIAGNOSTIC      11, due 16    HX CORONARY ARTERY BYPASS GRAFT  8/22/12    x 4 vessel by MISSY Ramirez    HX HEENT      LASIK    HX PACEMAKER PLACEMENT  1/30/13    NM CARDIAC SURG PROCEDURE UNLIST  2012    x 4 vessels       FAMILY HISTORY:  Family History   Problem Relation Age of Onset    Heart Disease Mother         MI    Heart Disease Father         CAD & PVD    Lung Disease Father     Cancer Father         lung    Diabetes Maternal Grandmother     Heart Disease Other         CAD    Stroke Sister        SOCIAL HISTORY:  Social History     Socioeconomic History    Marital status:    Tobacco Use    Smoking status: Never Smoker    Smokeless tobacco: Never Used   Vaping Use    Vaping Use: Never used   Substance and Sexual Activity    Alcohol use: Yes     Alcohol/week: 0.0 standard drinks     Comment:  VERY RARE    Drug use: No       LABORATORY RESULTS:     Labs Latest Ref Rng & Units 3/4/2022 3/3/2022 3/2/2022 3/1/2022 2/5/2022 2/4/2022 2/3/2022   WBC 4.1 - 11.1 K/uL 2. 5(L) 2. 5(L) 2. 9(L) 3.0(L) 3.4(L) 3. 3(L) 2. 6(L)   RBC 4.10 - 5.70 M/uL 4.47 4.57 4.95 5.04 4.48 4.42 4.38   Hemoglobin 12.1 - 17.0 g/dL 10. 7(L) 10. 9(L) 11. 7(L) 11. 8(L) 10. 2(L) 10. 2(L) 10. 1(L)   Hematocrit 36.6 - 50.3 % 33. 7(L) 34. 7(L) 37.2 37.7 32. 4(L) 31. 9(L) 31. 4(L)   MCV 80.0 - 99.0 FL 75. 4(L) 75. 9(L) 75. 2(L) 74. 8(L) 72. 3(L) 72. 2(L) 71. 7(L)   Platelets 583 - 321 K/uL 120(L) 120(L) 140(L) 154 135(L) 125(L) 123(L)   Lymphocytes 12 - 49 % 27 26 26 18 19 26 29   Monocytes 5 - 13 % 13 12 12 14(H) 14(H) 15(H) 15(H)   Eosinophils 0 - 7 % 6 3 3 2 2 3 4   Basophils 0 - 1 % 1 0 1 1 1 1 1   Albumin 3.5 - 5.0 g/dL 2. 8(L) 2. 8(L) 3.0(L) 3.6 3.3(L) 3. 2(L) 2. 9(L)   Calcium 8.5 - 10.1 MG/DL 8.4(L) 8.8 9.0 9.4 9.4 8.8 8.6   Glucose 65 - 100 mg/dL 112(H) 105(H) 94 134(H) 46(LL) 66 101(H)   BUN 6 - 20 MG/DL 14 11 16 21(H) 20 22(H) 20   Creatinine 0.70 - 1.30 MG/DL 1.05 1.01 1.03 1.18 1.30 1.24 1.15   Sodium 136 - 145 mmol/L 136 136 132(L) 132(L) 136 137 138   Potassium 3.5 - 5.1 mmol/L 3.5 3. 4(L) 3.6 3.6 3.6 3.9 3.8   TSH 0.36 - 3.74 uIU/mL - - - 1.68 - - -   LDH 85 - 241 U/L - - 145 - - - -   Some recent data might be hidden     Lab Results   Component Value Date/Time    TSH 1.68 03/01/2022 11:50 AM    TSH 1.47 05/26/2021 10:44 PM    TSH 1.97 05/20/2021 04:28 PM    TSH 1.41 02/09/2021 03:05 PM    TSH 1.740 08/14/2018 09:58 AM    TSH 1.710 10/18/2017 12:00 AM       ALLERGY:  Allergies   Allergen Reactions    Oxycodone Anaphylaxis    Pcn [Penicillins] Hives        CURRENT MEDICATIONS:    Current Facility-Administered Medications:     prasugreL (EFFIENT) tablet 10 mg, 10 mg, Oral, DAILY, Polliard, Heddy Look T, NP, 10 mg at 03/04/22 0934    alteplase (CATHFLO) 1 mg in sterile water (preservative free) 1 mL injection, 1 mg, InterCATHeter, PRN, Chelsea Niño MD    bacitracin 500 unit/gram packet 1 Packet, 1 Packet, Topical, PRN, Chelsea Niño MD    sodium chloride (NS) flush 5-40 mL, 5-40 mL, IntraVENous, Q8H, Scott Mark MD, 10 mL at 03/04/22 4017    sodium chloride (NS) flush 5-40 mL, 5-40 mL, IntraVENous, PRN, Deon Gold MD    eplerenone (INSPRA) tablet tab 50 mg (Patient Supplied), 50 mg, Oral, DAILY, Duane Broderick NP, 50 mg at 03/04/22 7391    aspirin delayed-release tablet 81 mg, 81 mg, Oral, DAILY, Caden Gibbons NP, 81 mg at 03/04/22 0934    bumetanide (BUMEX) tablet 2 mg, 2 mg, Oral, BID, Davie, Abigail Maier NP, 2 mg at 03/04/22 9519    hydrALAZINE (APRESOLINE) tablet 50 mg, 50 mg, Oral, TID, Daphine Spurling, EARNESTINE, 50 mg at 03/04/22 0932    isosorbide mononitrate ER (IMDUR) tablet 30 mg, 30 mg, Oral, Q12H, Abigail Broderick NP, 30 mg at 03/04/22 0934    milrinone (PRIMACOR) 20 MG/100 ML D5W infusion, 0.375 mcg/kg/min, IntraVENous, CONTINUOUS, Abigail Broderick NP, Last Rate: 8.6 mL/hr at 03/04/22 0939, 0.375 mcg/kg/min at 03/04/22 0939    pantoprazole (PROTONIX) tablet 20 mg, 20 mg, Oral, ACB, Abigail Broderick NP, 20 mg at 03/04/22 0704    potassium chloride SR (KLOR-CON 10) tablet 40 mEq, 40 mEq, Oral, BID, Bobne Spurling, NP, 40 mEq at 03/04/22 0933    tamsulosin (FLOMAX) capsule 0.4 mg, 0.4 mg, Oral, DAILY, Abigail Broderick NP, 0.4 mg at 03/04/22 0932    rosuvastatin (CRESTOR) tablet 10 mg, 10 mg, Oral, QHS, Abigail Broderick NP, 10 mg at 03/03/22 2114    ranolazine ER (RANEXA) tablet 500 mg, 500 mg, Oral, Q12H, Daphine Spurling, NP, 500 mg at 03/04/22 3991    glucose chewable tablet 16 g, 4 Tablet, Oral, PRN, Charo Velasquez MD    dextrose 10 % infusion 0-250 mL, 0-250 mL, IntraVENous, PRNCharo MD    glucagon (GLUCAGEN) injection 1 mg, 1 mg, IntraMUSCular, PRN, Charo Velasquez MD    insulin lispro (HUMALOG) injection, , SubCUTAneous, Q6H, Ramona Godwin MD    sodium chloride (NS) flush 5-40 mL, 5-40 mL, IntraVENous, Q8H, Ramona Godwin MD, 10 mL at 03/04/22 1224    sodium chloride (NS) flush 5-40 mL, 5-40 mL, IntraVENous, PRN, Charo Velasquez MD    acetaminophen (TYLENOL) tablet 650 mg, 650 mg, Oral, Q4H PRN, Charo Velasquez MD    PATIENT CARE TEAM:  Patient Care Team:  Onel Garcia MD as PCP - General (Internal Medicine)  Onel Garcia MD as PCP - [facility]  Deonna Marquez MD as Physician (Cardiology)  Iris Terry MD as Physician (Cardiology)  Nathan Bose MD as Physician (Gastroenterology)  Cristopher Sorensen, Maite Alfaro MD as Physician (Orthopedic Surgery)  Bernadette Lennon MD as Physician (Ophthalmology)  Virgil Quezada MD as Physician ([address])  ePggy Calabrese MD (Cardiology)  Jessie Morgan MD (Cardiology)       Thank you for your referral and allowing me to participate in this patient's care. Adithya Anthony NP  Heart Failure Nurse Practitioner   [id number]   [address] [facility], [facility] [address] / [place]  W: [phone]/ F: [phone]    [facility] ATTENDING ADDENDUM    Patient was seen and examined in person. Data and notes were reviewed. I have discussed and agree with the plan as noted in the NP note above without further additions.     Manolo Flores MD PhD  Nic[address]aker [id number]

## 2022-03-04 NOTE — PROGRESS NOTES
Spiritual Care Partner Volunteer visited patient in Rm 457 on 3/4/22. Documented by:   Chaplain Cohen MDiv, MACE  [phone] [phone])

## 2022-03-07 ENCOUNTER — PATIENT OUTREACH (OUTPATIENT)
Dept: CASE MANAGEMENT | Age: 77
End: 2022-03-07

## 2022-03-07 ENCOUNTER — TELEPHONE (OUTPATIENT)
Dept: INTERNAL MEDICINE CLINIC | Age: 77
End: 2022-03-07

## 2022-03-07 NOTE — PROGRESS NOTES
Care Transitions Initial Call    Call within 2 business days of discharge: Yes     Patient: Shari Nuñez Sr Patient : 1945 MRN: [id number]    Last Discharge [address]       Complaint Diagnosis Description Type Department Provider    3/1/22  Acute on chronic clinical systolic heart failure ([facility] [address] . .. Admission (Discharged) Lisha Kwok MD          Was this an external facility discharge? No     Challenges to be reviewed by the provider   Additional needs identified to be addressed with provider: yes  advance care planning- ACP is NOT valid, not dated, no current HIPAA  Results for Maximiliano Oliva (MRN [id number]) as of 3/7/2022 12:47   3/2/2022 03:43   Hemoglobin A1c, (calculated) 6.7 (H)       Results for Maximiliano Oliva (MRN [id number]) as of 3/7/2022 12:47   3/2/2022 10:53   Iron 32 (L)   Iron % saturation 11 (L)        Method of communication with provider : staff message    Discussed COVID-19 related testing which was available at this time. Test results were negative. Patient informed of results, if available? no     Advance Care Planning:   Does patient have an Advance Directive:  Has an ACP on file NOT DATED so NOT VALID    Inpatient Readmission Risk score: Unplanned Readmit Risk Score: 17.5 ( )    Was this a readmission? no   Patient stated reason for the admission: testing for LVAD    Patients top risk factors for readmission: medical condition-HF, home milrinone   Interventions to address risk factors: Scheduled appointment with PCP-Patient has a call out to PCP for an appt, is on waiting list., Scheduled appointment with Feliberto Rich has an appt with [facility] on 3/8/22 at 11:30am and Assessment and support for treatment adherence and medication management-HTN, HF, DM    Care Transition Nurse (CTN) contacted the patient by telephone to perform post hospital discharge assessment. Verified name and  with patient as identifiers.  Provided introduction to self, and explanation of the CTN role. CTN reviewed discharge instructions, medical action plan and red flags with patient who verbalized understanding. Were discharge instructions available to patient? yes. Reviewed appropriate site of care based on symptoms and resources available to patient including: PCP and Specialist. Patient given an opportunity to ask questions and does not have any further questions or concerns at this time. The patient agrees to contact the PCP office for questions related to their healthcare. Medication reconciliation was performed with patient, who verbalizes understanding of administration of home medications. Referral to Pharm D needed: no     Home Health/Outpatient orders at discharge: home health care  [address]: [facility]  Date of initial visit: resumption    Durable Medical Supplies ordered at discharge: [address] received: 3/4/22    Covid Risk Education    COVID-19, Pfizer Purple top, DILUTE for use, 12+ yrs, 30mcg/0.3mL dose 11/1/2021, 10/14/2021, 2/17/2021, 1/27/2021       Was patient discharged with a pulse oximeter? no.   Discussed follow-up appointments. If no appointment was previously scheduled, appointment scheduling offered: no. Is follow up appointment scheduled within 7 days of discharge? yes. [facility] follow up appointment(s):   Future Appointments   Date Time Provider Arya Doherty   3/8/2022 11:30 AM Thomas MONTERO NP [facility] BS AMB   3/17/2022  5:00 PM [facility]   4/5/2022  7:45 AM Dolly Crowder MD [facility] BS AMB   7/12/2022 10:30 AM Luz Gustafson MD [facility] BS AMB     Non-[facility] follow up appointment(s): none    Plan for follow-up call in 5-7 days based on severity of symptoms and risk factors. CTN provided contact information for future needs.     Goals Addressed                 This Visit's Progress     COMPLETED: Attends follow-up appointments as directed. 06/01/21  [facility] follow up appointment(s):   Patient seen at [facility] today- Dr. Deborah Ugalde   Date Time Provider Arya Doherty   6/2/2021  2:30 PM Leigh Ann Lopez MD [facility] AMB   6/8/2021 11:00 AM [facility] CT MAIN [address] [facility]   6/16/2021 10:30 AM Lucila Hough MD [facility] BS AMB     Non-[facility] follow up appointment(s): Will scheduled for dental follow appt. Plans to start cardiac rehab at [facility]  Has referral for nutrition. 5/26/2021  [facility] follow up appointment(s):   Future Appointments   Date Time Provider Arya Doherty   5/27/2021  2:00 PM Dee Gustafson MD [facility]  AMB   6/1/2021 10:30 AM Lucila Hough MD [facility] AMB          COMPLETED: Attends follow-up appointments as directed. 2/8/2022  Reviewed upcoming appts with pt and wife and they confirmed follow up appts. AFP       Attends follow-up appointments as directed. 03/07/22    Patient has an appt with [facility] at 11:30 am   Patient has reached out to PCP office, is on the cancellation list for appt.  Patient scheduled for LVAD on 4/5/22 at 7:45am with Dr. Abran Leung. Monitor status of these appt on next call. Augusto Rodriguez MSN, RN, CCM / Care Transition Nurse / [phone]        COMPLETED: Knowledge and adherence of prescribed medication (ie. action, side effects, missed dose, etc.).        2/8/2022  performed medication reconciliation with pt and pt's wife and verified medications. AFP       COMPLETED: Prevent complications post hospitalization. 06/01/21   Continues to keep BP/pulse log   Daily weights   Activity- walking, try to increase stamina. Plan to start cardiac rehab.    5/26/2021  Plan is for patient to get work up for possible LVAD. Needs lung bx, bone marrow bx, and colonoscopy per notes.  COMPLETED: Understands red flags post discharge.         2/8/2022  Reviewed red flags with pt and pt's wife.  Will review low Na diet and importance of daily wts on next call. Pt reports that he follows a low Na diet and does weigh daily and reports that weight is trending down. AFP       Understands red flags post discharge. 03/07/22    Patient denies any SOB, cough or pedal edema. Today's weight is 165 lbs. Patient states he weighs daily, reviewed what 3 lbs in 1 day or 5 lbs in 1 week mean-call provider.  Patient is up without assist device.  Has milrinone at 28.5 mcg/min via (R) arm PICC-last recorded PICC. Has [facility] supplying the Milrinone, also has [facility] as HH.  Patient is following low sodium diet, wife cooks the food. Monitor for SOB, cough, pedal edema, activity intolerance, daily weight, glucose level and PICC line status on next call.     Osito Tristan MSN, RN, CCM / Care Transition Nurse / [phone]

## 2022-03-07 NOTE — TELEPHONE ENCOUNTER
----- Message from Mehnaz Singh sent at 3/7/2022  9:20 AM EST -----  Subject: Message to Provider    QUESTIONS  Information for Provider? Patient request call back. would like an in   person appointment with a sooner date   ---------------------------------------------------------------------------  --------------  CALL BACK INFO  What is the best way for the office to contact you? OK to leave message on   voicemail  Preferred Call Back Phone Number? [phone]  ---------------------------------------------------------------------------  --------------  SCRIPT ANSWERS  Relationship to Patient?  Self

## 2022-03-08 ENCOUNTER — OFFICE VISIT (OUTPATIENT)
Dept: CARDIOLOGY CLINIC | Age: 77
End: 2022-03-08
Payer: MEDICARE

## 2022-03-08 ENCOUNTER — DOCUMENTATION ONLY (OUTPATIENT)
Dept: CARDIOLOGY CLINIC | Age: 77
End: 2022-03-08

## 2022-03-08 ENCOUNTER — TELEPHONE (OUTPATIENT)
Dept: CARDIAC REHAB | Age: 77
End: 2022-03-08

## 2022-03-08 VITALS
HEIGHT: 73 IN | WEIGHT: 166.2 LBS | TEMPERATURE: 97 F | HEART RATE: 64 BPM | SYSTOLIC BLOOD PRESSURE: 114 MMHG | OXYGEN SATURATION: 98 % | DIASTOLIC BLOOD PRESSURE: 58 MMHG | BODY MASS INDEX: 22.03 KG/M2 | RESPIRATION RATE: 16 BRPM

## 2022-03-08 DIAGNOSIS — I50.22 CHRONIC SYSTOLIC HEART FAILURE (HCC): Primary | ICD-10-CM

## 2022-03-08 DIAGNOSIS — Z79.899 RECEIVING INOTROPIC MEDICATION: ICD-10-CM

## 2022-03-08 PROCEDURE — 99214 OFFICE O/P EST MOD 30 MIN: CPT | Performed by: NURSE PRACTITIONER

## 2022-03-08 PROCEDURE — G8427 DOCREV CUR MEDS BY ELIG CLIN: HCPCS | Performed by: NURSE PRACTITIONER

## 2022-03-08 PROCEDURE — G8536 NO DOC ELDER MAL SCRN: HCPCS | Performed by: NURSE PRACTITIONER

## 2022-03-08 PROCEDURE — 1101F PT FALLS ASSESS-DOCD LE1/YR: CPT | Performed by: NURSE PRACTITIONER

## 2022-03-08 PROCEDURE — G8420 CALC BMI NORM PARAMETERS: HCPCS | Performed by: NURSE PRACTITIONER

## 2022-03-08 PROCEDURE — G8432 DEP SCR NOT DOC, RNG: HCPCS | Performed by: NURSE PRACTITIONER

## 2022-03-08 RX ORDER — POTASSIUM CHLORIDE 1500 MG/1
40 TABLET, FILM COATED, EXTENDED RELEASE ORAL 2 TIMES DAILY
Qty: 120 TABLET | Refills: 2 | Status: SHIPPED | OUTPATIENT
Start: 2022-03-08 | End: 2022-03-09 | Stop reason: SDUPTHER

## 2022-03-08 RX ORDER — POTASSIUM CHLORIDE 1500 MG/1
40 TABLET, FILM COATED, EXTENDED RELEASE ORAL 2 TIMES DAILY
Qty: 120 TABLET | Refills: 2 | Status: SHIPPED | OUTPATIENT
Start: 2022-03-08 | End: 2022-03-08

## 2022-03-08 NOTE — TELEPHONE ENCOUNTER
Cardiac Rehab; Call was placed to [address] [place] [address] to discuss returning to the OP program. He has about 7 visits left and now with plan for LVAD implant on 4/5. Currently at [facility] office so will call us back when he gets home. Lucius Schwab, RN

## 2022-03-08 NOTE — TELEPHONE ENCOUNTER
Spoke with pt's wife Parveen Robles - advised her Dr Evgeny Heard is out of the office until 3/14/22. The first available appt is on 3/17/22 which is the day of his appt.

## 2022-03-08 NOTE — PROGRESS NOTES
[facility] in [address] Education:   Met with patient and patient's caregiver Som Pierce) and reviewed the following:    Reviewed LVAD terms and functions handout. Emphasized the importance of using correct vocabulary when discussing LVAD. Reviewed each term while showing equipment. Handout for LVAD Driveline exit site dressing change given to [place]. Discussed with [place] the process of learning to complete a sterile dressing change with CVICU/CVSU nurses. Pichardo Heartmate 3 LVAD Patient Guide: [employer] booklet given. Reviewed contents of booklet. No further questions at this time. Encouraged patient and [place] to call with any questions and to complete as much education as possible before his implant date. Patient verbalized agreement.      Joyce Garsia, RN  VAD Coordinator

## 2022-03-08 NOTE — PATIENT INSTRUCTIONS
Medication changes: Your potassium has been refilled. Please take this to your pharmacy to notify them of the change in medications. Testing Ordered:    No testing today. Labs with your [facility] nurse at their next visit. Other Recommendations: We will call you to review the results of our board meeting on Friday. Ensure your drinking an adequate amount of water with a goal of 6-8 eight ounce glasses (1.5-2 liters) of fluid daily. Your urine should be clear and light yellow straw colored. If your blood pressure begins to consistently run below 90/60 and/or you begin to experience dizziness or lightheadedness, please contact the [place] [address] at [phone]. Follow up for admission/surgery to [facility], date and time TBD. Please monitor your weights daily upon waking and after using the bathroom. Keep a written records of your weights and bring to your next appointment. If you have a weight gain of 3 or more pounds overnight OR 5 or more pounds in one week please contact our office. Thank you for allowing us the privilege of being a part of your healthcare team! Please do not hesitate to contact our office at [phone] with any questions or concerns. Virtual Heart Failure [address] invites you to learn more about heart failure and to share your questions, ideas, and experiences with others. Each month, the Heart Failure Support Group features a new educational topic and a guest speaker, followed by an interactive discussion. Our Heart Failure Nurse Navigator will moderate each session. You will be able to participate by phone, tablet or computer through [employer]. This support group takes place on the 3rd Thursday of each month from 6:00-7:30PM. All individuals living with heart failure and their caregivers are welcome to join.      If you are interested in participating, please contact us at [email or web address] and you will be sent the link to join the [employer].

## 2022-03-08 NOTE — PROGRESS NOTES
[facility] in [facility], [facility] Note    Patient name: Gayatri Calloway  Patient : 1945  Patient MRN: [id number]  Date of service: 22    CHIEF COMPLAINT:  Follow up Appointment     PLAN OF CARE:   · Severe ischemic cardiomyopathy, LVEF 15-20%; stage D, NYHA class IV symptoms; patient unerwent LVAD-DT evaluation; lung nodule biopsy was positive for adenocarcinoma; he subsequently underwent radioactive stereotactic therapy with excellent result - anticipated 90% 2 year prognosis per Dr. Elisabeth Esteban  · LVAD evaluation has been completed with the exception of obtaining podiatry records; scheduled for follow up   · S/p recent EGD and colonoscopy, path pending   · Plan to re-present at [facility] 3/12 with planned admission 4/3 and implant    · Meanwhile, we continue chronic palliative infusion of milrinone and working on optimizing nutritional status and muscle conditioning    RECOMMENDATIONS:  Continue current medical therapy for heart failure  Continue milrinone 0.3mcg/kg/min   TTE 2022 shows LVEF 10-15%, mod-severe MR, mod TR   Intolerant to Coreg due to RV dysfunction   ACEi/ARB/ARNi previously discontinued in anticipation of cardiac surgery  Continue current dose of eplerenone 50mg daily  Continue Farxiga 10mg daily  Continue current dose of Hydralazine 50 TID and Imdur 30 mg twice daily  Continue bumex 2 mg qAM and 1 mg qPM   Continue ASA 81 mg daily   Continue Effient, hold 7 days prior to implant (last dose 3/28 for  implant)   Continue current dose of rosuvastatin 10 mg daily  Continue ranolazine 500 mg BID (for HR and angina control)  Continue CPAP therapy - uses most of the time   Advanced care plan forms completed   Genetic test negative   Reinforced heart healthy, low salt diet  Reinforced appropriate fluid intake of 6 x 8oz glasses of water per day  Monitor and record daily weights and BPs  Follow-up with primary cardiologist Dr. Rox De Oliveira with EP cardiologist  Follow-up with PCP  Fully vaccinated   VAD education initiated today     Return for admission early April for implant 4/5; details TBD     IMPRESSION:  Fatigue at rest  Shortness of breath with exertion  Acute on chronic systolic heart failure  · Stage D, NYHA class IV symptoms improved to class II on inotropes  · Non-ischemic cardiomyopathy, LVEF 20% with LVEDD 6.2  · RV dysfunction, TAPSE 1.22 (prelimnary read)  · TBili 1.5 likely from cardiac congestion  At risk of sudden cardiac death  · Recent cardiac arrest   · S/p ICD (1/2013, [facility]); New implant on 10/21/2021 [employer]  Coronary artery disease  · S/p multiple interventions  · S/p 4V CABG (8/2012)  · LHC (7/2019) severe stenosis of LIMA to LAD anastomosis site, SVG graft to OM is occluded, SVG graft to RCA 40-50%, LAD occluded proximally, severe proximal LCx, RCA is the long . · PET (6/2019) EF 24% with anterior lateral and inferior reversible defect  Cardiac risk factors  · HTN  · HL  · DM2  · SRUTHI on CPAP  · MIld carotid stenosis  Anemia, microcytic  · Iron deficiency  DVT with filter  Pulmonary nodules   Dysphagia     CARDIAC IMAGING:  Echo (3/2/22)   · LV 10-15%  · Mod-severe MR     Echo (11/23/21):  · LV 15-20%. · Mod-severe, MR, mod-TR    Echo (5/20/21)  · LV: Estimated LVEF is 20 - 25%. Normal wall thickness. Mildly dilated left ventricle. Severely and globally reduced systolic function. Severe (grade 4) left ventricular diastolic dysfunction. · LA: Severely dilated left atrium. · RA: Severely dilated right atrium. · MV: Moderate mitral valve regurgitation is present. · TV: Moderate tricuspid valve regurgitation is present. · AO: Mild aortic root dilatation. · RV: Pacer/ICD present. · LVED 6.2cm    EKG (5/20/21) SB with 1degree AV block, QRS 116ms     LHC (5/24/21) Native Coronaries: LM - moderate to severe distal disease 50%.   % prox occluded (), heavily calcified, LCx: 80% proximal stenosis. OM1 is  (seen filling faintly via collaterals). OM2 99% stenosis. RCA: 100% proximal occluded.  LIMA to LAD: patent, supplies only a diagonal branch (probably D2). The LAD distal to the bifurcation is 100% occluded () and fills via right to left collaterals off the SVG to RCA. SVG to R-PDA: patent with moderate diffuse irregularities but no obstructive disease in the graft.    No appealing interventional targets.      NST as above     ICD Interrogation (11/12/2021) [employer] VVI, thoracic impedence trending up, no events, normal device function and good battery  ICD interrogation (5/12/21) [email or web address] single lead, no events, normal device function and good battery     HEMODYNAMICS:  RHC 5/24/21 CI 1.97, PA 33/9/17, RA 1, PCWP 6- off milrinone   CPEST not done     Patient underwent a 6 minute walk test 11/12/2021    6 Min Walk Report 11/12/2021 7/6/2021 5/20/2021   (PRE) HR 88 98 74   (PRE) O2 Sat 100 - 99   (POST)  - 81   (POST) O2 Sat 99 98% on Ra 99   Distance in Meters 386.58 - 1158.24         OTHER IMAGING:  CXR (6/17/21) clear  CT 5/21/21 1. Irregular pulmonary nodule in the left upper lobe measuring 2 cm, suspicious for primary pulmonary malignancy. 2. Additional 6 mm left upper lobe pulmonary nodule. 3. Severe calcific atherosclerosis of the coronary arteries and abdominal aorta. No aneurysm. 4. Cardiomegaly. 5. No acute abnormality within the chest, abdomen, or pelvis. HISTORY OF PRESENT ILLNESS:  I had the pleasure of seeing [facility] in [address] [place] [address] at [address] [place] [address] in [place].     Briefly, [facility] is a 69 y.o. male with h/o HTN, HL, DM2, SRUTHI on CPAP, chronic systolic heart failure, stage D, NYHA class IV symptoms, non-ischemic cardiomyopathy, LVEF 20% with LVEDD 6.2, RV dysfunciton, TAPSE 1.22, TBili 1.5 likely from cardiac congestion, recent cardiac arrest s/p ICD (1/2013, [employer] followed by [employer]), coronary artery disease s/p multiple interventions s/p 4V CABG (8/2012), LHC (7/2019) severe stenosis of LIMA to LAD anastomosis site, SVG graft to OM is occluded, SVG graft to RCA 40-50%, LAD occluded proximally, severe proximal LCx, RCA is the long . PET (6/2019) EF 24% with anterior lateral and inferior reversible defect. Anemia, microcytic with iron deficiency, DVT with filter.     Patient was referred to [facility] by Dr. Lala Shabazz for evaluation of his candidacy for advanced therapies.     Patient agreed to inpatient initiation of palliative infusion of chronic inotropes and evaluation for LVAD-DT. Patient was admitted 5/2-5/25/21. Patient was started on milrinone infusion and had first part of LVAD evaluation completed. Right and left heart cath on 5/24/21 that showed severe diffuse native vessel CAD, with patent grafts (LIMA to D2, SVG to RCA).  Antiplatelet therapy was held during hospitalization and after discharge due to anticipated pulmonary nodule biopsy, bone marrow biopsy and EGD/C-Scope as part of LVAD workup.     Patient was readmitted 5/26-5/28/21 with hypertension, headache and chest pain, his troponin peaked at 10; he was shortly bridged with heparin, otherwise had normal EKG and chest CT negative for PE. Cardiology and AHF service was consulted and patient was discharged home after troponin came down.     Patient has now completed radiation treatment for adenocarcinoma of the lung. Hem-onc has cleared patient for VAD implant with 90% 2 year prognosis. He was most recently admitted for elective EGD and colonoscopy which he tolerated well; no immediate concerning findings and path is pending. INTERVAL HISTORY:  Today, I had the pleasure of seeing Mr. José Silverio in clinic accompanied by his wife. Aside from some fatigue and BRAND at times Patient can perform home activities without problem.  He denies other cardiac symptoms such as chest pain or leg pain with walking. He reports no volume overload or leg edema. Reports a good appetite. He also denies abdominal bloating, nausea or early satiety. Patient's weight remained stable. He denies orthopnea, PND or nocturia. Denies irregular heart rate or palpitations. No presyncope or syncope. ICD has not fired. Patient is compliant with fluid restriction and taking medications as prescribed. Patient manages his own medications. His wife is most worried about his weight loss. REVIEW OF SYSTEMS:  Review of Systems   Constitutional: Positive for weight loss. HENT: Negative. Eyes: Negative. Respiratory: Positive for shortness of breath. Cardiovascular: Negative for chest pain, palpitations and claudication. Gastrointestinal: Negative. Genitourinary: Negative. Musculoskeletal: Negative. Skin: Negative. Neurological: Negative for dizziness, focal weakness and weakness. Psychiatric/Behavioral: Negative. PHYSICAL EXAM:  Visit Vitals  BP (!) 114/58 (BP 1 Location: Left arm, BP Patient Position: Sitting, BP Cuff Size: Adult)   Pulse 64   Temp 97 °F (36.1 °C) (Oral)   Resp 16   Ht 6' 1\" (1.854 m)   Wt 166 lb 3.2 oz (75.4 kg)   SpO2 98%   BMI 21.93 kg/m²     Physical Exam  Constitutional:       Appearance: He is normal weight. HENT:      Head: Normocephalic and atraumatic. Eyes:      Extraocular Movements: Extraocular movements intact. Conjunctiva/sclera: Conjunctivae normal.   Cardiovascular:      Rate and Rhythm: Normal rate and regular rhythm. Pulses: Normal pulses. Pulmonary:      Breath sounds: Examination of the left-lower field reveals decreased breath sounds. Decreased breath sounds present. Abdominal:      General: Abdomen is flat. Bowel sounds are normal.      Palpations: Abdomen is soft. Musculoskeletal:      Cervical back: Normal range of motion and neck supple. Right lower leg: No edema. Left lower leg: No edema.    Skin:     General: Skin is warm and dry. Neurological:      General: No focal deficit present. Mental Status: He is alert and oriented to person, place, and time. Psychiatric:         Mood and Affect: Mood normal.          PAST MEDICAL HISTORY:  Past Medical History:   Diagnosis Date    CAD (coronary artery disease) '90 '97, '13 x 2    [place], code [place] in [place] [facility]    Calculus of kidney     Colonic polyps     Congestive heart failure, unspecified     Diabetes ([address]     Gastritis     Hypercholesterolemia     Sleep apnea        PAST SURGICAL HISTORY:  Past Surgical History:   Procedure Laterality Date    COLONOSCOPY  04/06/2011    16, due 21    COLONOSCOPY N/A 3/2/2022    COLONOSCOPY    :- performed by Roge Palmer [facility] at [facility] ENDOSCOPY    ENDOSCOPY, COLON, DIAGNOSTIC      11, due 16    HX CORONARY ARTERY BYPASS GRAFT  8/22/12    x 4 vessel by MISSY Ramirez    HX HEENT      LASIK    HX PACEMAKER PLACEMENT  1/30/13    ME CARDIAC SURG PROCEDURE UNLIST  2012    x 4 vessels       FAMILY HISTORY:  Family History   Problem Relation Age of Onset    Heart Disease Mother         MI    Heart Disease Father         CAD & PVD    Lung Disease Father     Cancer Father         lung    Diabetes Maternal Grandmother     Heart Disease Other         CAD    Stroke Sister        SOCIAL HISTORY:  Social History     Socioeconomic History    Marital status:    Tobacco Use    Smoking status: Never Smoker    Smokeless tobacco: Never Used   Vaping Use    Vaping Use: Never used   Substance and Sexual Activity    Alcohol use: Yes     Alcohol/week: 0.0 standard drinks     Comment:  VERY RARE    Drug use: No       LABORATORY RESULTS:  Labs Latest Ref Rng & Units 3/4/2022 3/3/2022 3/2/2022 3/1/2022 2/5/2022 2/4/2022 2/3/2022   WBC 4.1 - 11.1 K/uL 2. 5(L) 2. 5(L) 2. 9(L) 3.0(L) 3.4(L) 3. 3(L) 2. 6(L)   RBC 4.10 - 5.70 M/uL 4.47 4.57 4.95 5.04 4.48 4.42 4.38   Hemoglobin 12.1 - 17.0 g/dL 10. 7(L) 10. 9(L) 11. 7(L) 11. 8(L) 10. 2(L) 10. 2(L) 10. 1(L) Hematocrit 36.6 - 50.3 % 33. 7(L) 34. 7(L) 37.2 37.7 32. 4(L) 31. 9(L) 31. 4(L)   MCV 80.0 - 99.0 FL 75. 4(L) 75. 9(L) 75. 2(L) 74. 8(L) 72. 3(L) 72. 2(L) 71. 7(L)   Platelets 584 - 248 K/uL 120(L) 120(L) 140(L) 154 135(L) 125(L) 123(L)   Lymphocytes 12 - 49 % 27 26 26 18 19 26 29   Monocytes 5 - 13 % 13 12 12 14(H) 14(H) 15(H) 15(H)   Eosinophils 0 - 7 % 6 3 3 2 2 3 4   Basophils 0 - 1 % 1 0 1 1 1 1 1   Albumin 3.5 - 5.0 g/dL 2. 8(L) 2. 8(L) 3.0(L) 3.6 3.3(L) 3. 2(L) 2. 9(L)   Calcium 8.5 - 10.1 MG/DL 8.4(L) 8.8 9.0 9.4 9.4 8.8 8.6   Glucose 65 - 100 mg/dL 112(H) 105(H) 94 134(H) 46(LL) 66 101(H)   BUN 6 - 20 MG/DL 14 11 16 21(H) 20 22(H) 20   Creatinine 0.70 - 1.30 MG/DL 1.05 1.01 1.03 1.18 1.30 1.24 1.15   Sodium 136 - 145 mmol/L 136 136 132(L) 132(L) 136 137 138   Potassium 3.5 - 5.1 mmol/L 3.5 3. 4(L) 3.6 3.6 3.6 3.9 3.8   TSH 0.36 - 3.74 uIU/mL - - - 1.68 - - -   LDH 85 - 241 U/L - - 145 - - - -   Some recent data might be hidden       ALLERGY:  Allergies   Allergen Reactions    Oxycodone Anaphylaxis    Pcn [Penicillins] Hives        CURRENT MEDICATIONS:    Current Outpatient Medications:     milrinone (PRIMACOR) 20 mg/100 mL (200 mcg/mL) infusion, 28.5 mcg/min by IntraVENous route continuous. , Disp: 500 mL, Rfl: 1    tamsulosin (FLOMAX) 0.4 mg capsule, TAKE 1 CAPSULE DAILY, Disp: 90 Capsule, Rfl: 3    prasugreL (Effient) 10 mg tablet, Take 10 mg by mouth daily. , Disp: , Rfl:     metFORMIN (GLUCOPHAGE) 500 mg tablet, Take 1 Tablet by mouth two (2) times daily (with meals). , Disp: 180 Tablet, Rfl: 3    ranolazine ER (Ranexa) 500 mg SR tablet, Take 1 Tablet by mouth two (2) times a day., Disp: 180 Tablet, Rfl: 3    eplerenone (INSPRA) 50 mg tab tablet, Take 1 Tablet by mouth daily. , Disp: 90 Tablet, Rfl: 3    isosorbide mononitrate ER (IMDUR) 30 mg tablet, Take 1 Tablet by mouth every twelve (12) hours. , Disp: 180 Tablet, Rfl: 1    dapagliflozin (Farxiga) 10 mg tab tablet, Take 1 Tablet by mouth daily. , Disp: 90 Tablet, Rfl: 1    diphenhydrAMINE (Benadryl Allergy) 25 mg tablet, Take 25 mg by mouth every six (6) hours as needed. Needed every night d/t picc line causing itching, Disp: , Rfl:     glucose blood VI test strips (OneTouch Ultra Test) strip, USE TO TEST BLOOD SUGAR DAILY, Disp: 100 Strip, Rfl: 3    OneTouch Delica Plus Lancet 33 gauge misc, USE TO TEST BLOOD SUGAR DAILY, Disp: 100 Lancet, Rfl: 3    rosuvastatin (CRESTOR) 10 mg tablet, TAKE 1 TABLET NIGHTLY, Disp: 90 Tab, Rfl: 3    lancets misc, Use to test blood sugar daily, Disp: 100 Each, Rfl: 11    acetaminophen (TYLENOL EXTRA STRENGTH) 500 mg tablet, Take  by mouth every six (6) hours as needed. , Disp: , Rfl:     aspirin delayed-release 81 mg tablet, Take 81 mg by mouth daily. , Disp: , Rfl:     benzonatate (TESSALON) 200 mg capsule, Take 200 mg by mouth three (3) times daily as needed for Cough. (Patient not taking: Reported on 3/7/2022), Disp: , Rfl:     bumetanide (BUMEX) 2 mg tablet, Take 1 Tablet by mouth two (2) times a day. (Patient taking differently: Take 2 mg by mouth two (2) times a day. 2 mg in am 1mg in evening), Disp: 90 Tablet, Rfl: 1    potassium chloride SR (K-TAB) 20 mEq tablet, Take 2 Tablets by mouth two (2) times a day. (Patient taking differently: Take 40 mEq by mouth two (2) times a day. Two tablets in am one tablet pm), Disp: 120 Tablet, Rfl: 2    hydrALAZINE (APRESOLINE) 50 mg tablet, TAKE 1 TABLET BY MOUTH THREE TIMES DAILY, Disp: 180 Tablet, Rfl: 2    omeprazole (PRILOSEC) 20 mg capsule, TAKE 1 CAPSULE BY MOUTH DAILY FOR INDIGESTION (Patient not taking: Reported on 3/8/2022), Disp: 30 Capsule, Rfl: 1      Thank you for your referral and allowing me to participate in this patient's care.     Edelmira Brock NP  Heart Failure Nurse Practitioner   [address], [address] [place]: [phone]/ F: [phone]

## 2022-03-08 NOTE — PROGRESS NOTES
MIKI met with patient and his spouse, Lora Mercado in the [facility] to review and complete LVAD documentation, which will be scanned into patient's medical record. MIKI reviewed AMD completed in July 2021, he stated all information is correct and he did not want to make any changes to this document. Patient completed the Serious Medical Condition Certification Form, except for the account number, which Lora Mercado will contact MIKI with this number. Patient confirmed demographics, insurance and pharmacy. MIKI provided Lora Mercado with BS Care Card for completion and to return to  along with supporting financial documentation. Plan is for tentative LVAD implant 4/5/22. Patient to be presented at [facility] on 3/11/22.

## 2022-03-09 ENCOUNTER — DOCUMENTATION ONLY (OUTPATIENT)
Dept: CARDIOLOGY CLINIC | Age: 77
End: 2022-03-09

## 2022-03-09 RX ORDER — POTASSIUM CHLORIDE 1500 MG/1
40 TABLET, FILM COATED, EXTENDED RELEASE ORAL 2 TIMES DAILY
Qty: 360 TABLET | Refills: 1 | Status: SHIPPED | OUTPATIENT
Start: 2022-03-09

## 2022-03-09 NOTE — PROGRESS NOTES
MECHANICAL CIRCULATORY SUPPORT & TRANSPLANT EVALUATION  Advanced Heart Failure [address]   1945            69 y.o.   male   Ethnicity:   Marital status:   [address] [address]     Phone: [phone]   MRN: [id number]          INTERMACS: 3  Time of GDMT: Inotrope Dependent  Height:     Ht Readings from Last 1 Encounters:   05/21/21 6' 1\" (1.854 m)      Weight:   75.4 kg (3/8/22)     BMI: 21.92 kg/m2  Blood type: B+  Referring MD: Onel Garcai MD  Date Consented to Eval: 5/21/21  Date of Presentation: 5/28/21  Date of Representation 4/5/22      Cardiac history:  Fatigue at rest  Shortness of breath with exertion  Acute on chronic systolic heart failure  · Stage D, NYHA class IV symptoms improved to class II on inotropes  · Non-ischemic cardiomyopathy, LVEF 20% with LVEDD 6.2  · RV dysfunction, TAPSE 1.22 (prelimnary read)  · TBili 1.5 likely from cardiac congestion  At risk of sudden cardiac death  · Recent cardiac arrest   · S/p ICD (1/2013, [employer] followed by [facility]); New implant on 10/21/2021 [place] [facility]  Coronary artery disease  · S/p multiple interventions  · S/p 4V CABG (8/2012)  · LHC (7/2019) severe stenosis of LIMA to LAD anastomosis site, SVG graft to OM is occluded, SVG graft to RCA 40-50%, LAD occluded proximally, severe proximal LCx, RCA is the long .    · PET (6/2019) EF 24% with anterior lateral and inferior reversible defect  Cardiac risk factors  · HTN  · HL  · DM2  · SRUTHI on CPAP  · MIld carotid stenosis      Devices: ICD (1/2013, [employer] followed by [facility])  Sternotomies: CABG 2012 Other medical history:   Anemia, microcytic  · Iron deficiency  DVT with filter  Pulmonary nodules    Dysphagia    Other surgical history:  As above      Medications:  Bumex 2 mg  Potassium Chloride 40 mEq  Hydralazine 50mg  Milrinone 0.375 mcg/kg/min  Inspra 50mg  Imdur 30mg  [facility] 81mg  Effient 10mg  Crestor 10mg  Ranexa 500 Mg  Farxiga 10 MG Allergies: Allergies   Allergen Reactions    Pcn [Penicillins] Hives    Oxycodone                                  Anaphylaxis         Cardiovascular imaging:  Echo 21  ·  LV: Estimated LVEF is 20 - 25%. Normal wall thickness. Mildly dilated left ventricle. Severely and globally reduced systolic function. Severe (grade 4) left ventricular diastolic dysfunction. · LA: Severely dilated left atrium. · RA: Severely dilated right atrium. · MV: Moderate mitral valve regurgitation is present. · TV: Moderate tricuspid valve regurgitation is present. · AO: Mild aortic root dilatation. RV: Pacer/ICD present. LVIDd 6.2 cm    Updated: 3/2/22  Left Ventricle: Left ventricle is severely dilated. Increased wall thickness. Global hypokinesis present. The EF by visual approximation is 10 - 15%.   Aortic Valve: Mild transvalvular regurgitation.   Mitral Valve: Moderate to severe transvalvular regurgitation.   Left Atrium: Left atrium is dilated.   Right Atrium: Right atrium is dilated.   A pacer lead noted in Rt heart  LVIDd 6.1 cm          Cardiac MRI: not done  PYP test: 21   IMPRESSION: Nuclear Cardiac Amyloid SPECT:  Equivocal for myocardial TTR amyloidosis                                                        LHC: 21  Native Coronaries:  LM - moderate to severe distal disease 50%. % prox occluded (), heavily calcified  LCx: 80% proximal stenosis. OM1 is  (seen filling faintly via collaterals). OM2 99% stenosis  RCA: 100% proximal occluded. LIMA to LAD: patent, supplies only a diagonal branch (probably D2). The LAD distal to the bifurcation is 100% occluded () and fills via right to left collaterals off the SVG to RCA. SVG to R-PDA: patent with moderate diffuse irregularities but no obstructive disease in the graft.       NST: not done     Viability: not done EK21  Sinus bradycardia with sinus arrhythmia with 1st degree AV block   Anteroseptal infarct , age undetermined   When compared with ECG of 20-MAY-2021 17:18,   No significant change      Updated: 3/1/22  Sinus rhythm with 1st degree AV block   Low voltage QRS   Septal infarct (cited on or before 01-FEB-2022)   Prolonged QT   When compared with ECG of 02-FEB-2022 03:25,   premature ventricular complexes are no longer present   ME interval has increased   Nonspecific T wave abnormality no longer evident in Inferior leads   Confirmed by Blanquita Oshea ([id number]) on 3/2/2022 2:01:55 PM   RHC:5/24/21  PA 33/9/17   RA mean 1  PCWP mean 6  [place] CI 1.97  SVR 1748.56 dcs-5  .36 dsc-5     CPEST: not done     6MW:  Date: 5/25/21   Pre/Post HR: 74/81   Pre/Post SpO2: 99/99   Distance (meters): 198.12     Date: Updated 2/4/22   Pre/Post HR: 77/105   Pre/Post SpO2: 99/93   Distance (meters): 137.16     Abdominal ultrasound: 3/12/21  Cholelithiasis. Increased heterogenous hepatic ela most likely related to hepatic parenchymal  change. Ankle-Brachial Index: 5/23/21  · No evidence of hemodynamically significant arterial obstruction in the bilateral lower extremities   Carotid doppler: 5/22/21  · Consistent with less than 50% stenosis (low end) of the right internal carotid and minimal stenosis of the left internal carotid. · Vertebrals are patent with antegrade flow. Non-cardiac imaging:  CT chest/abd/pelvis: 5/22/21  1. Irregular pulmonary nodule in the left upper lobe measuring 2 cm, suspicious  for primary pulmonary malignancy. 2. Additional 6 mm left upper lobe pulmonary nodule. 3. Severe calcific atherosclerosis of the coronary arteries and abdominal aorta. No aneurysm. 4. Cardiomegaly. 5. No acute abnormality within the chest, abdomen, or pelvis. Head CT: not applicable    Updated: 7/0/29  IMPRESSION  1. No pulmonary emboli. 2. Pleural effusions. 3. Treated RAVEN carcinoma as discussed      CXR: 5/20/21  Cardiomegaly.  No acute cardiopulmonary process    Updated: 2/1/22  IMPRESSION  1. No acute disease or change. 2. RAVEN cancer, treated with radiation. PFT 5/28/21    Predicted Measured %   FVC 4.69  3.13  67<    FEV1  3.05 2.62  86    FEV1/FVC  65.02 83.74 129>   DLCO  24.90 24.20 97    EGD: 3/2/22  Impression:      -Mild gastropathy with erosions in the antrum.  -Small gastric nodules - suspicious for pancreatic rests  -Small hiatal hernia. -Otherwise negative exam.      Colonoscopy: 3/2/22  Impression:  -Two small (4-5 mm) polyps found in the cecum and sigmoid colon, removed and sent for pathology  -Mild sigmoid colon diverticulosis  -Small internal hemorrhoids    FINAL PATHOLOGIC DIAGNOSIS    1. Stomach, biopsy:        Gastric antral and oxyntic-type mucosa with mild chronic gastritis   No Helicobacter organisms identified on routine H&E stained sections     2. Stomach, antrum, biopsy:        Gastric antral type mucosa with focal intestinal metaplasia arising   in a background of chronic gastritis   No Helicobacter organisms identified on routine H&E stained sections     3.  Colon, polypectomy:        Tubular adenoma       Mammogram: not applicable     GYN/Pap smear: not applicable     Dexa scan: not done      Labs:  CBC: (3/4/22)  -WBC 2.5  -HGB 10.7  -  CMP: (3/4/22)  -Na 136  -Glu 112  -BUN 14  -Cr 1.05  -Ca 8.4  -TBili 0.7  -ALT 12  -AST 44  -Alk phos 148  -Albumin 2.8  ProBNP 3/4/22 - 3,615  ABG  (5/21/21) 7.53/32.7/108/27.4  REBECCA (5/20/21)  negative  Blood culture (5/21/21) NGTD  CK  (5/20/21) 63  Cortisol (5/21/21) 16.0  CRP (5/21/21) 2.6  Digoxin level n/a  ESR (5/21/21) 12  Fecal Occult (2/4/22) negative  Ferritin (3/2/22) 108  G6PD (5/21/21) 5.29  H.pylori Ab (5/21/21) negative  Heparin Ab 5/21/21) 0.094  Hep C Ab (5/20/21) nonreactive  HgbA1C (3/2/22) 6.7  HIV 1,2 Ab (5/22/21) nonreactive  INR (3/1/22) 1.2  Iron sat (3/2/22) 11  Lactic acid (31/22) 1.4  LDH  (3/2/22) 145  Lipid profile (3/1/22) 134  Lupus anticoagulant (5/21/21) not detected.   Peth (2/24/22 - negative Magnesium (3/4/22)  1.7  MRSA Swab (5/21/21)  negative  Plasma Free Hgb (5/21/21) 1.2  Prealbumin (5/21/21) 22.3  Procalcitonin (2/2/22) 0.11  Protein C (5/21/21)  83  Protein S (5/21/21) 98  Prothrombin gene mutation (5/21/21) negative  PSA (5/20/21) 2.2  PTH (5/21/21) 100.4  PTT (6/1/21) 25.0  RPR (5/21/21) nonreactive  LEANDRA not applicable  Troponin High Sens (2/1/22) 45  TSH (3/1/22) 1.68  T3 (5/21/21) 2.5  T4 (3/1/22) 1.3  Uric acid (2/2/22) 6.0  Vit D level (2/2/22) 129.3  Gammopathy Profile: (5/20/21)  -IgG level 1315  -M-spike 0.4  -Free Kappa 29.1   -Free lambda 63.3  -Kappa/lambda 0.46      Urine Studies:  Cotinine (5/21/21) negative  Microalbumin (5/22/21) 3.11  Pregnancy Test not applicable  UA (7/6/54) WNL  UDS (3/3/22) negative  Urine Cx  - not indicated     Transplant Labs: not indicated  CMV IgG   EBV IgG   Hep B core Ab - total   Hep B surface Ag/Ab   Hepatitis A Ab-IgG  HSV 1/HSV2 IgG   HTLV1, 2 IgG   Mumps IgG   Quantiferon Gold   Rubella IgG   Rubeola IgG   Toxo IgG   Varicella IgG PRA Class I and II   HLA type                Immunizations:  COVID completed 11/1/21  Hep A not indicated  Hep B not indicated  Influenza 11/9/2020  Pneumonia 11/12/2021  Td 2009  Zoster 2010        Consultations:  Specialty Date: Results:   Advanced Heart Failure 5/20/2021      Updated 3/8/22   · Severe ischemic cardiomyopathy, LVEF 20-25%; stage D, NYHA class IV symptoms; inotrope dependent;     · Severe ischemic cardiomyopathy, LVEF 15-20%; stage D, NYHA class IV symptoms; patient unerwent LVAD-DT evaluation; lung nodule biopsy was positive for adenocarcinoma; he subsequently underwent radioactive stereotactic therapy with excellent result - anticipated 90% 2 year prognosis per Dr. Terry Coronado  · LVAD evaluation has been completed with the exception of obtaining podiatry records; scheduled for follow up   · S/p recent EGD and colonoscopy, path pending   · Plan to re-present at [facility] 3/12 with planned admission 4/3 and implant 4/5   · Meanwhile, we continue chronic palliative infusion of milrinone and working on optimizing nutritional status and muscle conditioning     Cardiac surgery       Gastroenterology 5/24/21        Updated 3/2/22       Will plan for EGD/colonoscopy as pre-LVAD work up whenever cardiology team feels patient is ready and patient can be prepped       Recommendations:  -Resume normal medications  -Acid suppression with a proton pump inhibitor.   -No Non-Steroidal Anti Inflammatorys (NSAIDS)   -Await pathology.   -Proceed with colonoscopy today  Recommendations:   -Return patient to hospital floor for ongoing care. -Resume normal medication(s). -Begin fiber supplement daily   -High fiber diet. GERD diet: avoid fried and fatty foods. peppermint, chocolate, alcohol, coffee, citrus fruits and juices, tomato products; avoid lying down for 2 to 3 hours after eating.     -Await pathology.  -No further colorectal cancer screening recommended considering age. -Please call if any further questions/concerns. Nutrition 5/21/21 1. Encourage protein with all meals  2. Should be eating at least 75% of all meals  3. Add daily MVI     Palliative care   Unable to schedule as outpatient, will order as inpatient. Dental  6/3/2021 Dr. Neil Tariq - Mr. José Silverio is in excellent dental health. There were no concerns noted during this visit and is cleared dentally.    As Indicated:       Nephrology   n/a   Hematology 5/24/21                                  Updated 3/3/22 Cytopenias:  Dr. 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  2.  Monoclonal spike:  If he is felt to be a transplant candidate, we would consider bone marrow biopsy.   A smoldering myeloma would likely disqualify him from the list.  If he is not felt to be a transplant candidate, a small monoclonal spike without anemia, hypercalcemia, or renal dysfunction can likely be observed. I recommend that he keep his office visit and follow up with Dr. Roz Canchola on 06/12/2021        ASSESSMENT AND PLAN:  A 52-year-old man with severe ischemic cardiomyopathy, on home inotropes, admitted for left ventricular assist device assessment, asked to see for Hem-Onc clearance, diagnosed with monoclonal gammopathy of undetermined significance, and had a bone marrow biopsy last summer showing 5% plasma cells without evidence of clonality. He was also found to have FDG-avid left upper lobe pulmonary adenocarcinoma, status post stereotactic radiosurgery. 1.  Monoclonal gammopathy of undetermined significance: This is a precancerous condition and only progresses to multiple myeloma in 1% of patients per year. I feel it is reasonable for him to proceed with left ventricular assist device with his precancerous condition. 2.  Clinical T1b N0 left upper lobe pulmonary adenocarcinoma, status post stereotactic radiosurgery, completed 09/2021:  ** for T1 and T2 regions, treated with stereotactic radiosurgery exceeds 90%. Therefore, I feel it is reasonable for him to proceed with left ventricular assist device given it is likely he is cured from this and as a never smoker, he does not have the same high risk for a second malignancy as a long-term smoker would. Infectious diseases   n/a   Pulmonology  5/22/21 Left upper lobe lung mass is subpleural in location and easily accessible with transthoracic needle aspiration biopsy which could be arranged next week- he is on dual platelet therapy with Effient and interventional radiologist may prefer to hold this for a few days before biopsy can be attempted   Endocrinology   n/a   GYN exam   n/a   Urology   n/a   Podiatry   Rescheduled for 3/18/22; Records requested to be sent following appointment. Ophthalmology  6/11/21 Dr. Rodger Caba - 1. NIDDM - no complications  2. Essential HTN - no HTN vascular changes  Plam - 1. Monitor yearly 2. Reassured, recheck annually; healthy retinas. Sleep medicine   [facility] requested to be sent following appointment. Frailty test  5/28/21 Based on the objective data described below, the patient presents with good mobility and some tachycardia with activity but with a quick recovery post amb. Based on eval and conversation with pt, he is a 5 on the Clinical Frailty Scale. Gait was stable, can clear him for discharge with no follow up PT recommended. [facility]   n/a   VAD education   Ongoing   Tx education   Deferred      Psychosocial and financial evaluation:  Date:  9/17/2021   :  Savi RODRIGUEZ   : Insurance coverage: Medicare [place] with prescription coverage  Transplant sites in network: N/A   Recommendations:    Impressions/Barriers:   No obvious barriers, patient seems to have ample social/caregiver support, financial resources, insurance and housing     SW met with Alanna Calle and his spouse, Nicholas Aguilera () in the [facility] to complete this LVAD assessment for candidacy. Both Rocio Siegel and Parveenmaria m Robles spoke openly during conversation. Rocio Siegel has a good relationship with his three children, Domingo Carmona, who resides in [place] () and Servando Barton., live locally. Heron's son, lives in a group home, he is a non-verbal autistic person. Rocio Siegel is an [employer]. He has insurance coverage with Medicare/United [employer] and veterans benefits through the [place]. Rocio Siegel stated he has SSD benefits and a small halfway through [address] [place] [address] Court and Parveen Travis denied financial concerns. Rocio Siegel endorses medication compliance and no difficulty obtaining his medications. Rocio Siegel denies any history of tobacco, alcohol or substance use/abuse.  Rocio Siegel shared he has never been under the care of a mental health professional or been hospitalized for psychiatric reasons. Rehanamiguel Rocael denies suicidal/homicidal ideations. Robby Cote denies a history of mental illness, incarceration, or substance related legal issues. Heron's caregivers pre/post LVAD implantation will be Merari Jimenez and Jordan. Merari Jimenez verbalized her participation and although Abigail Cesar was not present to confirm her participation she is already actively participating in [place] care and assisting with patient's home inotrope care and attends provider's updates via telephone. Robby Cote and Merari Jimenez have also named their granddaughter, Claire Gannon () and their niece Romina Lopez () as additional caregivers. Patient believes he understands what challenges and changes he will experience with LVAD implantation and shared he has concerns about having to carry batteries and the actual surgery and recovery. Rehanamiguel Rocael shared, at this time, he does not want to meet with someone who has an LVAD. Merari Jimenez stated, if Robby Cote chooses to proceed with LVAD implantation she will be supportive and an active caregiver, Sharee Mane he wants it, I'm there\". Merari Jimenez and Robby Cote have completed an AMD and it has been scanned into the patient's medical record. Currently, Vivek Viera is a good candidate for LVAD implantation with no identified risk factors.         Luiz Mota RN  VAD Coordinator  Advanced Heart Failure [address]  [address] [facility], [address]  Phone: [phone]  Fax: [phone]

## 2022-03-09 NOTE — TELEPHONE ENCOUNTER
Requested Prescriptions     Signed Prescriptions Disp Refills    potassium chloride SR (K-TAB) 20 mEq tablet 360 Tablet 1     Sig: Take 2 Tablets by mouth two (2) times a day.      Authorizing Provider: Nesha Liz     Ordering User: Jose Antonio Brambila

## 2022-03-11 ENCOUNTER — TELEPHONE (OUTPATIENT)
Dept: CARDIOLOGY CLINIC | Age: 77
End: 2022-03-11

## 2022-03-11 ENCOUNTER — DOCUMENTATION ONLY (OUTPATIENT)
Dept: CARDIOLOGY CLINIC | Age: 77
End: 2022-03-11

## 2022-03-11 NOTE — PROGRESS NOTES
LVAD/TRANSPLANT [address]   Date: 3/11/22    Committee Members:   Dr. Mora Batres MD PhD (Medical Director), Dr. Claude Bury, MD (Surgical Director), Claudean Abt, ACNP (Chief Nurse Practitioner), TEO Gray ([facility] Nurse Practitioner), Luiz Mota RN (VAD Coordinator), José Castro RN (VAD Coordinator), JAKY NassarW ()   Criteria Met:   Chronic systolic heart failure   Stage D, NYHA class IV symptoms   LVEF < 25%   Resting cardiac index < 2.2 L/min/m2   Inotrope dependence for more than 14 days   Absolute contraindications:   None.    Relative contraindications:   Advanced age  [facility] Frailty   Leukopenia   Completion of eval (Podiatry, Palliative Care consult, repeat gammopathy)    Decision:   Complete eval (as above)    Admit 4/3 for PAC placement and hemodynamic optimization    Implant scheduled 4/5    Plan of care discussion:  N/A   Additional comments:  N/A   Prepared by:   Donna Curry NP

## 2022-03-14 ENCOUNTER — TELEPHONE (OUTPATIENT)
Dept: CARDIOLOGY CLINIC | Age: 77
End: 2022-03-14

## 2022-03-14 ENCOUNTER — PATIENT OUTREACH (OUTPATIENT)
Dept: CASE MANAGEMENT | Age: 77
End: 2022-03-14

## 2022-03-14 DIAGNOSIS — I50.9 CHRONIC CONGESTIVE HEART FAILURE, UNSPECIFIED HEART FAILURE TYPE (HCC): Primary | ICD-10-CM

## 2022-03-14 NOTE — TELEPHONE ENCOUNTER
Called Marina at [facility] and requested patient's admission for LVAD Implant (scheduled for 4/5/22). Called and spoke with patient and patient's wife Kaya Brandon). Advised of Admission date changed to 4/3/22, Last date to take Effient is 3/27/22, and PT/OT orders being entered for patient. Patient verbalized understanding and has no further questions. PT/OT orders faxed to [facility]. Faxed records request to [facility] for patient's Sleep Medicine appointment from today 3/14/22.

## 2022-03-14 NOTE — ACP (ADVANCE CARE PLANNING)
Outbound call made to patient who has current AD on file, but is not dated. Message sent to [facility] to clarify if it should be updated with a date. Patient made aware ACM will follow up once clarification received.

## 2022-03-14 NOTE — PROGRESS NOTES
Care Transitions Follow Up Call    Challenges to be reviewed by the provider   STILL NEEDS VALID ACP- one on chart is not DATED! I have ACP referral entered and I have sent ACP paperwork via e-mail       Method of communication with provider : staff message     Care Transition Nurse (CTN) contacted the patient by telephone to follow up. Verified name and  with patient as identifiers. Addressed changes since last contact: none      CTN reviewed discharge instructions, medical action plan and red flags with patient and discussed any barriers to care and/or understanding of plan of care after discharge. Discussed appropriate site of care based on symptoms and resources available to patient including: PCP and Specialist. The patient agrees to contact the PCP office for questions related to their healthcare. [facility] follow up appointment(s):   Future Appointments   Date Time Provider Arya Doherty   3/17/2022  5:00 PM Lili Huntley MD [facility] BS AMB   2022  7:45 AM Shelby Najera MD [facility] BS AMB   2022 10:30 AM Mili Gustafson MD [facility]     Non-[facility] follow up appointment(s): none    CTN provided contact information for future needs. Plan for follow-up call in 7-10 days based on severity of symptoms and risk factors. Goals Addressed                 This Visit's Progress     Attends follow-up appointments as directed. On track     22    Patient has [facility] with [facility] at 11:30 am   Patient has reached out to PCP office, is on the cancellation list for appt.  Patient scheduled for LVAD on 22 at 7:45am with Dr. Divine Callejas. Monitor status of these appt on next call. Osito HDEZ, RN, CCM / Care Transition Nurse / [phone]    22    Patient was seen by [facility] on 3/8/22.  Has a virtual appt with PCP on 3/17/scheduled. Still scheduled for LVAD on 22.     Osito HDEZ, RN, CCM / Care Transition Nurse / [phone]  Understands red flags post discharge. On track     03/07/22    Patient denies any SOB, cough or pedal edema. Today's weight is 165 lbs. Patient states he weighs daily, reviewed what 3 lbs in 1 day or 5 lbs in 1 week mean-call provider.  Patient is up without assist device.  Has milrinone at 28.5 mcg/min via (R) arm PICC-last recorded PICC. Has [facility] supplying the Milrinone, also has [facility] as HH.  Patient is following low sodium diet, wife cooks the food. Monitor for SOB, cough, pedal edema, activity intolerance, daily weight, glucose level and PICC line status on next call. Sonali Betancur MSN, RN, CCM / Care Transition Nurse / [phone]     03/14/22    Patient states he is feeling well, denies SOB, cough or pedal edema. Weight today was 162 lbs.  Remains on Milrinone and getting PICC dressing changed 3/15/22. Denies any redness or drainage at present.  Seen by Pulm at [facility] today and has CPAP machine reset. Monitor SOB, cough, pedal edema, daily weight, milrinone and PICC line on next call.     Sonali Betancur MSN, RN, CCM / Care Transition Nurse / [phone]

## 2022-03-15 ENCOUNTER — TELEPHONE (OUTPATIENT)
Dept: CARDIOLOGY CLINIC | Age: 77
End: 2022-03-15

## 2022-03-15 NOTE — TELEPHONE ENCOUNTER
Returned call to patient's wife. Left a voicemail stating received her voicemail and relayed the message to Sara Lyons NP and Dr. Kristen Phillips> per Dr. Jeanie Haas patient needs to work with PT and OT as he is very deconditioned and needs to build some strength before surgery. In addition, pushing surgery forward would depend on Dr. Grisel Rojas schedule. Awaiting a call back.

## 2022-03-16 ENCOUNTER — TELEPHONE (OUTPATIENT)
Dept: CARDIOLOGY CLINIC | Age: 77
End: 2022-03-16

## 2022-03-16 NOTE — TELEPHONE ENCOUNTER
Per E. Terry, NP - call pt to check on weights/symptoms d/t labs received from [address] - Cr up to 1.49 and BNP down to 2287.    1210 - called pt using two pt identifiers. Pt denies any swelling, heart palpitations, chest pain and lightheaded/dizziness. Pt reports he get SOB w/ exertion. Pt reports weight:  3/14 - 162 lbs  3/15 - 163 lbs  3/16 - 162 lbs    Advised pt I would let provider know of his weights/symptoms and call back with their recommendation. Pt states understanding and has no further questions or concerns at this time. Morgan Bansal NP  You 1 hour ago (12:44 PM)     [address], continue for now since his weights are stable    Message text      [phone] - called pt using two pt identifiers. Advised pt of SHAQUILLE Stewart NP above message. Pt states understanding and has no further questions or concerns at this time.     Candelario Nieves RN

## 2022-03-17 ENCOUNTER — VIRTUAL VISIT (OUTPATIENT)
Dept: INTERNAL MEDICINE CLINIC | Age: 77
End: 2022-03-17
Payer: MEDICARE

## 2022-03-17 DIAGNOSIS — E85.4 CARDIAC AMYLOIDOSIS (HCC): ICD-10-CM

## 2022-03-17 DIAGNOSIS — I50.9 CHRONIC CONGESTIVE HEART FAILURE, UNSPECIFIED HEART FAILURE TYPE (HCC): Primary | ICD-10-CM

## 2022-03-17 DIAGNOSIS — K63.5 POLYP OF COLON, UNSPECIFIED PART OF COLON, UNSPECIFIED TYPE: ICD-10-CM

## 2022-03-17 DIAGNOSIS — I50.22 CHRONIC SYSTOLIC HEART FAILURE (HCC): Primary | ICD-10-CM

## 2022-03-17 DIAGNOSIS — I43 CARDIAC AMYLOIDOSIS (HCC): ICD-10-CM

## 2022-03-17 DIAGNOSIS — E11.51 TYPE 2 DIABETES, CONTROLLED, WITH PERIPHERAL CIRCULATORY DISORDER (HCC): ICD-10-CM

## 2022-03-17 PROCEDURE — 99495 TRANSJ CARE MGMT MOD F2F 14D: CPT | Performed by: INTERNAL MEDICINE

## 2022-03-17 PROCEDURE — G8427 DOCREV CUR MEDS BY ELIG CLIN: HCPCS | Performed by: INTERNAL MEDICINE

## 2022-03-17 NOTE — PROGRESS NOTES
Received orders from Edelmira Brock NP for patient to have Gammopathy and PYP completed before LVAD implant on 4/5/22. Called [facility] and ordered Gammopathy which will be drawn on 3/21/22. Called [facility] and scheduled PYP fpr March 30, 2022 at 0800 (Arrival time of 0745). Called and spoke with patient's wife. Author Becky verbalized understanding of scheduled PYP.

## 2022-03-17 NOTE — PROGRESS NOTES
HISTORY OF PRESENT ILLNESS  This is a real-time audio/video visit brought to you by our sponsors, the fine folks at [facility]. Alfonso Hawthorne is a 69 y.o. male. HPI  Assessment:  Donna He is seen today for follow up from recent hospitalization for procedure in preparation for his LVAD placement. He was admitted to [facility] on 03/01/22 and discharged on 03/04/22. Nurse navigator contact was on 03/07, two business days. His visit is today, the 17th. This represents a transitional care visit. 1. CHF. He feels stable. He is being followed very closely by the [address] [place] [address]. LVAD placement is pending for April. There may be an additional cardiac study pending as well. He has continued on his current regimen for now. 2. Colon polyps. He had colonoscopy performed. He did have some polyps. No immediate follow up is required. 3. Diabetes. Has remained in good control. They will follow up with me as scheduled later in the summer, but certainly sooner prn. I wished him the very best with his procedure and emphasized to him and his wife, Bryant Parra, that they should contact us if they need help in any way. Review of Systems   Constitutional: Negative for chills and fever. Respiratory: Positive for shortness of breath. Cardiovascular: Negative for chest pain. Physical Exam  Vitals and nursing note reviewed. Pulmonary:      Effort: No respiratory distress. Skin:     General: Skin is warm and dry. Neurological:      Mental Status: He is alert. Psychiatric:         Behavior: Behavior normal.         ASSESSMENT and PLAN  Diagnoses and all orders for this visit:    1. Chronic systolic heart failure ([address]    2. Type 2 diabetes, controlled, with peripheral circulatory disorder ([address]    3.  Polyp of colon, unspecified part of colon, unspecified type

## 2022-03-18 ENCOUNTER — PATIENT OUTREACH (OUTPATIENT)
Dept: CASE MANAGEMENT | Age: 77
End: 2022-03-18

## 2022-03-18 PROBLEM — Z78.9 ACTIVE ADVANCE DIRECTIVE ON FILE: Status: ACTIVE | Noted: 2017-03-07

## 2022-03-18 NOTE — ACP (ADVANCE CARE PLANNING)
Per [facility], [address] does not need to be updated due to no date on form. Current AD reviewed with patient and spouse, Marilee Perkins with no changes to be made. No further outreach scheduled.

## 2022-03-19 PROBLEM — I50.23 ACUTE ON CHRONIC CLINICAL SYSTOLIC HEART FAILURE (HCC): Status: ACTIVE | Noted: 2021-05-20

## 2022-03-19 PROBLEM — I21.4 NSTEMI (NON-ST ELEVATED MYOCARDIAL INFARCTION) (HCC): Status: ACTIVE | Noted: 2021-05-27

## 2022-03-21 ENCOUNTER — TELEPHONE (OUTPATIENT)
Dept: CARDIOLOGY CLINIC | Age: 77
End: 2022-03-21

## 2022-03-21 NOTE — TELEPHONE ENCOUNTER
Called and requested records from patient's 3/18/22 appointment with [facility]. Awaiting records.

## 2022-03-22 ENCOUNTER — TELEPHONE (OUTPATIENT)
Dept: CARDIOLOGY CLINIC | Age: 77
End: 2022-03-22

## 2022-03-22 NOTE — TELEPHONE ENCOUNTER
Returned call to patient's wife. Author Becky stated patient had a stiff neck, had taken some Tylenol and was feeling better. Inquired if patient had started PT/OT. Author Pena stated they have not received a call from [facility] at this point. Scheduled LVAD training with patient and patient's wife for 3/28/22 at 1000. Called and spoke with [facility]. Informed we had faxed a referral for PT/OT on 3/16/22 and patient has not started therapy. Requested intake nurse to call back.

## 2022-03-24 ENCOUNTER — PATIENT OUTREACH (OUTPATIENT)
Dept: CASE MANAGEMENT | Age: 77
End: 2022-03-24

## 2022-03-24 NOTE — PROGRESS NOTES
Care Transitions Follow Up Call  Care Transition Nurse (CTN) contacted the patient by telephone to follow up. Verified name and  with patient as identifiers. CTN reviewed discharge instructions, medical action plan and red flags with patient and discussed any barriers to care and/or understanding of plan of care after discharge. Discussed appropriate site of care based on symptoms and resources available to patient including: PCP and Specialist. The patient agrees to contact the PCP office for questions related to their healthcare. [facility] follow up appointment(s):   Future Appointments   Date Time Provider Arya Doherty   3/28/2022 10:00 AM NURSE_JF [facility] BS AMB   3/30/2022  8:00 AM NUCLEAR_[place] Acevedo BS AMB   2022  7:45 AM Janel Najera MD [facility] BS [facility]   2022 10:30 AM Irma Gustafson MD [facility]       CTN provided contact information for future needs. Plan for follow-up call in 7-10 days based on severity of symptoms and risk factors. Goals Addressed                 This Visit's Progress     COMPLETED: Attends follow-up appointments as directed. 22    Patient has an appt with [facility] 3/8 at 11:30 am   Patient has reached out to PCP office, is on the cancellation list for appt.  Patient scheduled for LVAD on 22 at 7:45am with Dr. Becky Baptiste. Monitor status of these appt on next call. Tex HDEZ, RN, CCM / Care Transition Nurse / [phone]    22    Patient was seen by [facility] on 3/8/22.  Has a virtual appt with PCP on 3/17 scheduled. Still scheduled for LVAD on 22. Tex HDEZ, RN, CCM / Care Transition Nurse / [phone]     22    Patient was seen by PCP as scheduled.  Scheduled admit on 4/3 for  LVAD planned. Monitor status of LVAD on next call. Deshaun0 [place] MSN, RN, CCM / Care Transition Nurse / [phone]        Understands red flags post discharge.    On track 03/07/22    Patient denies any SOB, cough or pedal edema. Today's weight is 165 lbs. Patient states he weighs daily, reviewed what 3 lbs in 1 day or 5 lbs in 1 week mean-call provider.  Patient is up without assist device.  Has milrinone at 28.5 mcg/min via (R) arm PICC-last recorded PICC. Has [facility] supplying the Milrinone, also has [facility] as HH.  Patient is following low sodium diet, wife cooks the food. Monitor for SOB, cough, pedal edema, activity intolerance, daily weight, glucose level and PICC line status on next call. Wende Duane MSN, RN, CCM / Care Transition Nurse / [phone]     03/14/22    Patient states he is feeling well, denies SOB, cough or pedal edema. Weight today was 162 lbs.  Remains on Milrinone and getting PICC dressing changed 3/15/22. Denies any redness or drainage at present.  Seen by Pulm at [place] today and has CPAP machine reset. Monitor SOB, cough, pedal edema, daily weight, milrinone and PICC line on next call. Wende Duane MSN, RN, CCM / Care Transition Nurse / [phone]       03/24/22    Patient denies any SOB, does have a dry cough. Weight today is 163 lbs.  PICC line without swelling or redness, continues with Milrinone.  Podiatry is faxing letter to Cards for pre op. per patient. Monitor SOB, cough pedal edema, daily weight, Milrinone status.       Wende Duane MSN, RN, [facility] / Care Transition Nurse / [phone]

## 2022-03-24 NOTE — TELEPHONE ENCOUNTER
Called [facility] (as did not receive a call back from 3/22/22 call). Spoke with Roopa Cordova who stated she \"has a new system\" and \"may not have received the fax\". Roopa Cordova requested referral be re-faxed and will call with when patient will be seen. Awaiting a call back.

## 2022-03-25 ENCOUNTER — TELEPHONE (OUTPATIENT)
Dept: CARDIOLOGY CLINIC | Age: 77
End: 2022-03-25

## 2022-03-25 NOTE — TELEPHONE ENCOUNTER
Have not received records from [facility] after 3/2122 request. Faxed letter with new urgent request for notes from patient's appointment from 3/18/22. Awaiting response.

## 2022-03-25 NOTE — TELEPHONE ENCOUNTER
Per Kody Martinez - I called patient and  reminded him and his wife that [place] 3-27-22 is the last day he is to take Effient. Both patient and his wife stated understanding and had no questions.

## 2022-03-28 ENCOUNTER — CLINICAL SUPPORT (OUTPATIENT)
Dept: CARDIOLOGY CLINIC | Age: 77
End: 2022-03-28

## 2022-03-28 ENCOUNTER — TELEPHONE (OUTPATIENT)
Dept: CARDIOLOGY CLINIC | Age: 77
End: 2022-03-28

## 2022-03-28 ENCOUNTER — DOCUMENTATION ONLY (OUTPATIENT)
Dept: CARDIOLOGY CLINIC | Age: 77
End: 2022-03-28

## 2022-03-28 DIAGNOSIS — I50.9 CHRONIC CONGESTIVE HEART FAILURE, UNSPECIFIED HEART FAILURE TYPE (HCC): Primary | ICD-10-CM

## 2022-03-28 NOTE — PROGRESS NOTES
MIKI met with patient and his spouse, Rekha Wing in the [facility];, they had questions about completing the [facility] Care Card application, questions were answered. MIKI provided Rekha Wing with the writer's contact information to return the application. Once received MIKI will forward application to [facility] financial assistance office. Plan is for patient to be implanted with HM3 LVAD 4/5/22. MIKI will continue to follow.

## 2022-03-28 NOTE — PROGRESS NOTES
Patient and patient's wife Hannah Byrne) were seen in clinic by LVAD Coordinator to continue LVAD education pre-surgery. Discussed and reviewed the following:    Patient's LVAD Education binder given. Reminded patient to bring with him to the hospital on Sunday. Reviewed the contents of the binder. [employer] Heartmate 3 LVAD Patient Guide: Excellence in Every Moment reviewed in its entirety. LVAD terms and functions- discussed each component of the LVAD system. Patient and Bryant Parra handled all equipment. - discussed each component of the HM3  display, lighted symbols and their meanings, and function of each button.  display- Discussed each  display screen. Discussed charging the Backup Battery in the backup  every 6 months with Daylight Savings time. Alarms- Reviewed \"red heart\" hazard and \"yellow wrench\" advisory alarms. Discussed the importance of calling 911 and subsequently the LVAD 24/7 emergency patient in the event of the \"red heart\" hazard alarm with loss of illuminated green arrows. Discussed contacting LVAD team for \"yellow wrench\" alarms. Power Sources- Discussed Mobile Power Unit (MPU) and 14 volt Li-Ion batteries. Educated patient and family that patient to use MPU at night when sleeping and any other time there is a chance of sleep. Discussed changing MPU batteries with daylight savings time, and that AA batteries chin only the MPU alarm. Demonstrated how to plug the power cord in the MPU and the length of the patient cables (allowing patient to walk to the bathroom at night without switching to batteries). Discussed 14 volt Li-ion batteries and battery clips. Discussed support time for batteries.   Demonstrated inserting batteries into clips, inserting power leads into clips, changing batteries in clips, changing only one power lead at a time, and changing power from batteries to MPU and back again. Patient and Marilee Perkins completed returned demonstration successfully. Universal Battery Charger - Reviewed indicator lights on battery charger and. Discussed the charging time for batteries and how to calibrate batteries. Back Up Emergency Equipment- Reviewed LVAD back up emergency bag and contents (Extra set of batteries, battery clips and back up ). Discussed bag accompanying patient at all times to ensure back up equipment available in case of emergency, even when patient is in the hospital.     Exchange- Briefly reviewed, but will discuss further and demonstrate on mock loop after patient is implanted. Sterile dressing change- Briefly discussed driveline sterile dressing change. Marilee Perkins educated that she will need to arrange dressing change education with the inpatient nurses to get checked off and  will likely need several sessions. Discussed need for dressing change daily for the first 30 days and 3 times a week after, unless otherwise indicated by LVAD team. Explained at least on family member needs to be fully checked off on dressing change prior to patient discharge and encouraged them to set up education with inpatient RN as soon as possible after implant once patient stable. Time given to ask questions. Patient and family had no further questions at this time. Explained to patient and Marilee Perkins that LVAD education is an ongoing process and will continue inpatient. Patient encouraged to work on LVAD workbook.        Deni Anthony, KIRK  VAD Coordinator

## 2022-03-28 NOTE — TELEPHONE ENCOUNTER
Called [facility] regarding patient's PT/OT ordered originally faxed on 3/16/22 and refaxed on 3/24/22. Spoke with Benjamín Cover on two occassions and orders have not be implemented. Spoke with Benjamín Cover who stated she will process through patient's insurance. Informed Benjamín Cover to cancel order as patient has surgery in 1 week. Elis Maher NP made aware.

## 2022-03-28 NOTE — TELEPHONE ENCOUNTER
Called [facility] and requested patient's records from appointment on 3/18/22. Informed rep that [facility] has made 4 requests and records are needed ASAP as patient is scheduled for LVAD on 4/5/22. EJ stated he would fax records ASAP.

## 2022-03-28 NOTE — TELEPHONE ENCOUNTER
I called patient due to thoracic impedance increasing on latitude. He denies dizziness, denies shortness of breath, denies swelling. Weight today is 163 lb. /80, HR 83. He is currently taking bumex 2 mg twice daily, milrinone is dosed at . 375 mcg/kg/min at dosing weight of 76.5 kg. I notified Aneesh Fuller, no changes at this time.

## 2022-03-29 ENCOUNTER — TELEPHONE (OUTPATIENT)
Dept: CARDIOLOGY CLINIC | Age: 77
End: 2022-03-29

## 2022-03-29 NOTE — TELEPHONE ENCOUNTER
Patient identity verified per protocol; appointment reminder given to for Amyloid PYP nuclear study. Patient given time, date, and location with address. A description of the test was also given to make patient aware of the time between injection and imaging (about 3 hrs). Patient verbalized understanding.

## 2022-03-29 NOTE — TELEPHONE ENCOUNTER
I spoke with Maribell Duenas at the [facility] EP department. She will be faxing patient records to us.

## 2022-03-30 ENCOUNTER — ANCILLARY PROCEDURE (OUTPATIENT)
Dept: CARDIOLOGY CLINIC | Age: 77
End: 2022-03-30
Payer: MEDICARE

## 2022-03-30 DIAGNOSIS — E85.4 CARDIAC AMYLOIDOSIS (HCC): ICD-10-CM

## 2022-03-30 DIAGNOSIS — I43 CARDIAC AMYLOIDOSIS (HCC): ICD-10-CM

## 2022-03-30 PROCEDURE — 78803 RP LOCLZJ TUM SPECT 1 AREA: CPT | Performed by: INTERNAL MEDICINE

## 2022-03-30 PROCEDURE — 78800 RP LOCLZJ TUM 1 AREA 1 D IMG: CPT | Performed by: INTERNAL MEDICINE

## 2022-03-30 PROCEDURE — A9538 TC99M PYROPHOSPHATE: HCPCS | Performed by: INTERNAL MEDICINE

## 2022-03-31 ENCOUNTER — TELEPHONE (OUTPATIENT)
Dept: CARDIOLOGY CLINIC | Age: 77
End: 2022-03-31

## 2022-03-31 ENCOUNTER — DOCUMENTATION ONLY (OUTPATIENT)
Dept: CARDIOLOGY CLINIC | Age: 77
End: 2022-03-31

## 2022-03-31 VITALS
WEIGHT: 166 LBS | HEIGHT: 73 IN | SYSTOLIC BLOOD PRESSURE: 114 MMHG | DIASTOLIC BLOOD PRESSURE: 58 MMHG | BODY MASS INDEX: 22 KG/M2

## 2022-03-31 NOTE — PROGRESS NOTES
SW received a fax from [facility], patient's spouse with the [facility] Care Card Application and supporting financial documentation. MIKI emailed this information to [facility] financial , on this date.

## 2022-03-31 NOTE — TELEPHONE ENCOUNTER
I contacted [facility] [phone]) regarding patient's medical records. I requested information regarding an AV node ablation. Records were checked with the electrophysiology department, the device clinic and the heart failure clinic. Per Corina Victor - there are no records of an AV node ablation in patient's chart.

## 2022-03-31 NOTE — TELEPHONE ENCOUNTER
Rohit and requested to speak with a manager. Informed of multiple attempts to obtain patient's medical records from appointment on 3/18/22. Awaiting records.

## 2022-04-01 LAB
NUC 3 HOUR HEART TO CONTRALATERAL RATIO: 1.16
STRESS TARGET HR: 144 BPM

## 2022-04-01 NOTE — TELEPHONE ENCOUNTER
Received patient records from podiatry - Marlen Jonhson. Records given to [facility] providers to review.

## 2022-04-03 ENCOUNTER — APPOINTMENT (OUTPATIENT)
Dept: GENERAL RADIOLOGY | Age: 77
DRG: 001 | End: 2022-04-03
Attending: THORACIC SURGERY (CARDIOTHORACIC VASCULAR SURGERY)
Payer: MEDICARE

## 2022-04-03 ENCOUNTER — HOSPITAL ENCOUNTER (INPATIENT)
Age: 77
LOS: 33 days | Discharge: HOME HEALTH | DRG: 001 | End: 2022-05-06
Attending: THORACIC SURGERY (CARDIOTHORACIC VASCULAR SURGERY) | Admitting: THORACIC SURGERY (CARDIOTHORACIC VASCULAR SURGERY)
Payer: MEDICARE

## 2022-04-03 DIAGNOSIS — Z95.811 LVAD (LEFT VENTRICULAR ASSIST DEVICE) PRESENT (HCC): ICD-10-CM

## 2022-04-03 DIAGNOSIS — I50.23 ACUTE ON CHRONIC CLINICAL SYSTOLIC HEART FAILURE (HCC): ICD-10-CM

## 2022-04-03 DIAGNOSIS — Z51.81 ANTICOAGULATION MANAGEMENT ENCOUNTER: ICD-10-CM

## 2022-04-03 DIAGNOSIS — I50.20 HFREF (HEART FAILURE WITH REDUCED EJECTION FRACTION) (HCC): ICD-10-CM

## 2022-04-03 DIAGNOSIS — I51.9 RIGHT VENTRICULAR DYSFUNCTION: ICD-10-CM

## 2022-04-03 DIAGNOSIS — Z79.899 RECEIVING INOTROPIC MEDICATION: ICD-10-CM

## 2022-04-03 DIAGNOSIS — Z51.5 PALLIATIVE CARE ENCOUNTER: ICD-10-CM

## 2022-04-03 DIAGNOSIS — Z95.811 S/P VENTRICULAR ASSIST DEVICE (HCC): ICD-10-CM

## 2022-04-03 DIAGNOSIS — R06.09 DYSPNEA ON EXERTION: ICD-10-CM

## 2022-04-03 DIAGNOSIS — I50.810 RVF (RIGHT VENTRICULAR FAILURE) (HCC): ICD-10-CM

## 2022-04-03 DIAGNOSIS — Z71.89 GOALS OF CARE, COUNSELING/DISCUSSION: ICD-10-CM

## 2022-04-03 DIAGNOSIS — I50.22 CHRONIC SYSTOLIC HEART FAILURE (HCC): ICD-10-CM

## 2022-04-03 DIAGNOSIS — E11.65 TYPE 2 DIABETES MELLITUS WITH HYPERGLYCEMIA, WITHOUT LONG-TERM CURRENT USE OF INSULIN (HCC): ICD-10-CM

## 2022-04-03 DIAGNOSIS — R57.0 CARDIOGENIC SHOCK (HCC): ICD-10-CM

## 2022-04-03 DIAGNOSIS — E11.51 TYPE 2 DIABETES, CONTROLLED, WITH PERIPHERAL CIRCULATORY DISORDER (HCC): ICD-10-CM

## 2022-04-03 DIAGNOSIS — Z79.01 ANTICOAGULATION MANAGEMENT ENCOUNTER: ICD-10-CM

## 2022-04-03 LAB
ALBUMIN SERPL-MCNC: 3.4 G/DL (ref 3.5–5)
ALBUMIN/GLOB SERPL: 0.9 {RATIO} (ref 1.1–2.2)
ALP SERPL-CCNC: 146 U/L (ref 45–117)
ALT SERPL-CCNC: 17 U/L (ref 12–78)
ANION GAP SERPL CALC-SCNC: 3 MMOL/L (ref 5–15)
AST SERPL-CCNC: 46 U/L (ref 15–37)
BASE EXCESS BLDV CALC-SCNC: 1 MMOL/L
BDY SITE: ABNORMAL
BILIRUB SERPL-MCNC: 0.5 MG/DL (ref 0.2–1)
BUN SERPL-MCNC: 25 MG/DL (ref 6–20)
BUN/CREAT SERPL: 21 (ref 12–20)
CALCIUM SERPL-MCNC: 9.3 MG/DL (ref 8.5–10.1)
CHLORIDE SERPL-SCNC: 103 MMOL/L (ref 97–108)
CO2 SERPL-SCNC: 26 MMOL/L (ref 21–32)
CREAT SERPL-MCNC: 1.17 MG/DL (ref 0.7–1.3)
ERYTHROCYTE [DISTWIDTH] IN BLOOD BY AUTOMATED COUNT: 25.1 % (ref 11.5–14.5)
FERRITIN SERPL-MCNC: 172 NG/ML (ref 26–388)
GLOBULIN SER CALC-MCNC: 3.8 G/DL (ref 2–4)
GLUCOSE BLD STRIP.AUTO-MCNC: 128 MG/DL (ref 65–117)
GLUCOSE BLD STRIP.AUTO-MCNC: 141 MG/DL (ref 65–117)
GLUCOSE SERPL-MCNC: 132 MG/DL (ref 65–100)
HCO3 BLDV-SCNC: 26 MMOL/L (ref 23–28)
HCT VFR BLD AUTO: 37.5 % (ref 36.6–50.3)
HGB BLD-MCNC: 12.1 G/DL (ref 12.1–17)
HISTORY CHECKED?,CKHIST: NORMAL
INR PPP: 1.1 (ref 0.9–1.1)
IRON SATN MFR SERPL: 17 % (ref 20–50)
IRON SERPL-MCNC: 41 UG/DL (ref 35–150)
LACTATE SERPL-SCNC: 0.7 MMOL/L (ref 0.4–2)
MCH RBC QN AUTO: 24.9 PG (ref 26–34)
MCHC RBC AUTO-ENTMCNC: 32.3 G/DL (ref 30–36.5)
MCV RBC AUTO: 77.3 FL (ref 80–99)
NRBC # BLD: 0 K/UL (ref 0–0.01)
NRBC BLD-RTO: 0 PER 100 WBC
PCO2 BLDV: 40.3 MMHG (ref 41–51)
PH BLDV: 7.42 [PH] (ref 7.32–7.42)
PLATELET # BLD AUTO: 144 K/UL (ref 150–400)
PO2 BLDV: 32 MMHG (ref 25–40)
POTASSIUM SERPL-SCNC: 3.7 MMOL/L (ref 3.5–5.1)
PROT SERPL-MCNC: 7.2 G/DL (ref 6.4–8.2)
PROTHROMBIN TIME: 11.7 SEC (ref 9–11.1)
RBC # BLD AUTO: 4.85 M/UL (ref 4.1–5.7)
SAO2% DEVICE SAO2% SENSOR NAME: ABNORMAL
SERVICE CMNT-IMP: ABNORMAL
SERVICE CMNT-IMP: ABNORMAL
SODIUM SERPL-SCNC: 132 MMOL/L (ref 136–145)
SPECIMEN SITE: ABNORMAL
TIBC SERPL-MCNC: 240 UG/DL (ref 250–450)
URATE SERPL-MCNC: 6 MG/DL (ref 3.5–7.2)
WBC # BLD AUTO: 3.5 K/UL (ref 4.1–11.1)

## 2022-04-03 PROCEDURE — 74011250637 HC RX REV CODE- 250/637: Performed by: NURSE PRACTITIONER

## 2022-04-03 PROCEDURE — 85027 COMPLETE CBC AUTOMATED: CPT

## 2022-04-03 PROCEDURE — 86923 COMPATIBILITY TEST ELECTRIC: CPT

## 2022-04-03 PROCEDURE — 74011000250 HC RX REV CODE- 250: Performed by: NURSE PRACTITIONER

## 2022-04-03 PROCEDURE — 83735 ASSAY OF MAGNESIUM: CPT

## 2022-04-03 PROCEDURE — 83605 ASSAY OF LACTIC ACID: CPT

## 2022-04-03 PROCEDURE — 74011250636 HC RX REV CODE- 250/636: Performed by: NURSE PRACTITIONER

## 2022-04-03 PROCEDURE — 36415 COLL VENOUS BLD VENIPUNCTURE: CPT

## 2022-04-03 PROCEDURE — 81001 URINALYSIS AUTO W/SCOPE: CPT

## 2022-04-03 PROCEDURE — 80053 COMPREHEN METABOLIC PANEL: CPT

## 2022-04-03 PROCEDURE — 85610 PROTHROMBIN TIME: CPT

## 2022-04-03 PROCEDURE — 86900 BLOOD TYPING SEROLOGIC ABO: CPT

## 2022-04-03 PROCEDURE — 74011250636 HC RX REV CODE- 250/636

## 2022-04-03 PROCEDURE — 71045 X-RAY EXAM CHEST 1 VIEW: CPT

## 2022-04-03 PROCEDURE — 82803 BLOOD GASES ANY COMBINATION: CPT

## 2022-04-03 PROCEDURE — 83540 ASSAY OF IRON: CPT

## 2022-04-03 PROCEDURE — 84550 ASSAY OF BLOOD/URIC ACID: CPT

## 2022-04-03 PROCEDURE — P9045 ALBUMIN (HUMAN), 5%, 250 ML: HCPCS | Performed by: NURSE PRACTITIONER

## 2022-04-03 PROCEDURE — 82728 ASSAY OF FERRITIN: CPT

## 2022-04-03 PROCEDURE — 65610000003 HC RM ICU SURGICAL

## 2022-04-03 PROCEDURE — 84132 ASSAY OF SERUM POTASSIUM: CPT

## 2022-04-03 PROCEDURE — 99222 1ST HOSP IP/OBS MODERATE 55: CPT | Performed by: NURSE PRACTITIONER

## 2022-04-03 PROCEDURE — 82962 GLUCOSE BLOOD TEST: CPT

## 2022-04-03 RX ORDER — CHLORHEXIDINE GLUCONATE 1.2 MG/ML
15 RINSE ORAL EVERY 12 HOURS
Status: DISCONTINUED | OUTPATIENT
Start: 2022-04-03 | End: 2022-04-05

## 2022-04-03 RX ORDER — RANOLAZINE 500 MG/1
500 TABLET, EXTENDED RELEASE ORAL 2 TIMES DAILY
Refills: 3 | Status: DISCONTINUED | OUTPATIENT
Start: 2022-04-03 | End: 2022-04-05

## 2022-04-03 RX ORDER — MIDAZOLAM HYDROCHLORIDE 1 MG/ML
2 INJECTION, SOLUTION INTRAMUSCULAR; INTRAVENOUS ONCE
Status: COMPLETED | OUTPATIENT
Start: 2022-04-03 | End: 2022-04-03

## 2022-04-03 RX ORDER — ASPIRIN 81 MG/1
81 TABLET ORAL DAILY
Status: DISCONTINUED | OUTPATIENT
Start: 2022-04-03 | End: 2022-04-05

## 2022-04-03 RX ORDER — INSULIN LISPRO 100 [IU]/ML
INJECTION, SOLUTION INTRAVENOUS; SUBCUTANEOUS
Status: DISCONTINUED | OUTPATIENT
Start: 2022-04-03 | End: 2022-04-03 | Stop reason: SDUPTHER

## 2022-04-03 RX ORDER — FENTANYL CITRATE 50 UG/ML
100 INJECTION, SOLUTION INTRAMUSCULAR; INTRAVENOUS ONCE
Status: COMPLETED | OUTPATIENT
Start: 2022-04-03 | End: 2022-04-03

## 2022-04-03 RX ORDER — LEVOFLOXACIN 5 MG/ML
500 INJECTION, SOLUTION INTRAVENOUS ONCE
Status: COMPLETED | OUTPATIENT
Start: 2022-04-05 | End: 2022-04-05

## 2022-04-03 RX ORDER — MUPIROCIN 20 MG/G
1 OINTMENT TOPICAL 2 TIMES DAILY
Status: DISCONTINUED | OUTPATIENT
Start: 2022-04-04 | End: 2022-04-05

## 2022-04-03 RX ORDER — MIDAZOLAM HYDROCHLORIDE 1 MG/ML
INJECTION, SOLUTION INTRAMUSCULAR; INTRAVENOUS
Status: COMPLETED
Start: 2022-04-03 | End: 2022-04-03

## 2022-04-03 RX ORDER — HYDRALAZINE HYDROCHLORIDE 50 MG/1
50 TABLET, FILM COATED ORAL 3 TIMES DAILY
Status: DISCONTINUED | OUTPATIENT
Start: 2022-04-03 | End: 2022-04-05

## 2022-04-03 RX ORDER — INSULIN GLARGINE 100 [IU]/ML
1-50 INJECTION, SOLUTION SUBCUTANEOUS
Status: DISCONTINUED | OUTPATIENT
Start: 2022-04-03 | End: 2022-04-03

## 2022-04-03 RX ORDER — ROSUVASTATIN CALCIUM 10 MG/1
10 TABLET, COATED ORAL
Status: DISCONTINUED | OUTPATIENT
Start: 2022-04-03 | End: 2022-04-05

## 2022-04-03 RX ORDER — INSULIN LISPRO 100 [IU]/ML
INJECTION, SOLUTION INTRAVENOUS; SUBCUTANEOUS
Status: DISCONTINUED | OUTPATIENT
Start: 2022-04-03 | End: 2022-04-04

## 2022-04-03 RX ORDER — ALBUMIN HUMAN 50 G/1000ML
12.5 SOLUTION INTRAVENOUS ONCE
Status: COMPLETED | OUTPATIENT
Start: 2022-04-03 | End: 2022-04-03

## 2022-04-03 RX ORDER — SODIUM CHLORIDE 9 MG/ML
9 INJECTION, SOLUTION INTRAVENOUS CONTINUOUS
Status: DISCONTINUED | OUTPATIENT
Start: 2022-04-03 | End: 2022-04-05

## 2022-04-03 RX ORDER — MILRINONE LACTATE 0.2 MG/ML
0.38 INJECTION, SOLUTION INTRAVENOUS CONTINUOUS
Status: DISPENSED | OUTPATIENT
Start: 2022-04-03 | End: 2022-04-04

## 2022-04-03 RX ORDER — POLYETHYLENE GLYCOL 3350 17 G/17G
17 POWDER, FOR SOLUTION ORAL DAILY
Status: DISCONTINUED | OUTPATIENT
Start: 2022-04-03 | End: 2022-04-05

## 2022-04-03 RX ORDER — SODIUM CHLORIDE 0.9 % (FLUSH) 0.9 %
5-40 SYRINGE (ML) INJECTION EVERY 8 HOURS
Status: DISCONTINUED | OUTPATIENT
Start: 2022-04-03 | End: 2022-04-05

## 2022-04-03 RX ORDER — AMOXICILLIN 250 MG
1 CAPSULE ORAL DAILY
Status: DISCONTINUED | OUTPATIENT
Start: 2022-04-03 | End: 2022-04-05

## 2022-04-03 RX ORDER — MAGNESIUM SULFATE 100 %
4 CRYSTALS MISCELLANEOUS AS NEEDED
Status: DISCONTINUED | OUTPATIENT
Start: 2022-04-03 | End: 2022-04-05

## 2022-04-03 RX ORDER — FLUCONAZOLE 2 MG/ML
200 INJECTION, SOLUTION INTRAVENOUS ONCE
Status: COMPLETED | OUTPATIENT
Start: 2022-04-05 | End: 2022-04-05

## 2022-04-03 RX ORDER — TAMSULOSIN HYDROCHLORIDE 0.4 MG/1
0.4 CAPSULE ORAL DAILY
Status: DISCONTINUED | OUTPATIENT
Start: 2022-04-03 | End: 2022-04-05

## 2022-04-03 RX ORDER — PANTOPRAZOLE SODIUM 40 MG/1
40 TABLET, DELAYED RELEASE ORAL
Status: DISCONTINUED | OUTPATIENT
Start: 2022-04-04 | End: 2022-04-05

## 2022-04-03 RX ORDER — ACETAMINOPHEN 325 MG/1
650 TABLET ORAL
Status: DISCONTINUED | OUTPATIENT
Start: 2022-04-03 | End: 2022-04-05

## 2022-04-03 RX ORDER — FENTANYL CITRATE 50 UG/ML
INJECTION, SOLUTION INTRAMUSCULAR; INTRAVENOUS
Status: COMPLETED
Start: 2022-04-03 | End: 2022-04-03

## 2022-04-03 RX ORDER — SODIUM CHLORIDE 0.9 % (FLUSH) 0.9 %
5-40 SYRINGE (ML) INJECTION AS NEEDED
Status: DISCONTINUED | OUTPATIENT
Start: 2022-04-03 | End: 2022-04-05

## 2022-04-03 RX ADMIN — SENNOSIDES AND DOCUSATE SODIUM 1 TABLET: 8.6; 5 TABLET ORAL at 13:28

## 2022-04-03 RX ADMIN — MIDAZOLAM HYDROCHLORIDE 2 MG: 1 INJECTION, SOLUTION INTRAMUSCULAR; INTRAVENOUS at 13:21

## 2022-04-03 RX ADMIN — ROSUVASTATIN 10 MG: 10 TABLET, FILM COATED ORAL at 21:18

## 2022-04-03 RX ADMIN — ACETAMINOPHEN 650 MG: 325 TABLET ORAL at 19:19

## 2022-04-03 RX ADMIN — SODIUM CHLORIDE, PRESERVATIVE FREE 10 ML: 5 INJECTION INTRAVENOUS at 21:14

## 2022-04-03 RX ADMIN — POLYETHYLENE GLYCOL 3350 17 G: 17 POWDER, FOR SOLUTION ORAL at 13:28

## 2022-04-03 RX ADMIN — MIDAZOLAM 2 MG: 1 INJECTION INTRAMUSCULAR; INTRAVENOUS at 13:21

## 2022-04-03 RX ADMIN — HYDRALAZINE HYDROCHLORIDE 50 MG: 50 TABLET, FILM COATED ORAL at 17:39

## 2022-04-03 RX ADMIN — MILRINONE LACTATE IN DEXTROSE 0.38 MCG/KG/MIN: 200 INJECTION, SOLUTION INTRAVENOUS at 13:28

## 2022-04-03 RX ADMIN — SODIUM CHLORIDE, PRESERVATIVE FREE 10 ML: 5 INJECTION INTRAVENOUS at 13:37

## 2022-04-03 RX ADMIN — RANOLAZINE 500 MG: 500 TABLET, FILM COATED, EXTENDED RELEASE ORAL at 18:50

## 2022-04-03 RX ADMIN — FENTANYL CITRATE 50 MCG: 50 INJECTION, SOLUTION INTRAMUSCULAR; INTRAVENOUS at 13:20

## 2022-04-03 RX ADMIN — FENTANYL CITRATE 50 MCG: 50 INJECTION INTRAMUSCULAR; INTRAVENOUS at 13:20

## 2022-04-03 RX ADMIN — SODIUM CHLORIDE 6 ML/HR: 9 INJECTION, SOLUTION INTRAVENOUS at 13:00

## 2022-04-03 RX ADMIN — ACETAMINOPHEN 650 MG: 325 TABLET ORAL at 14:57

## 2022-04-03 RX ADMIN — HYDRALAZINE HYDROCHLORIDE 50 MG: 50 TABLET, FILM COATED ORAL at 21:12

## 2022-04-03 RX ADMIN — ASPIRIN 81 MG: 81 TABLET, COATED ORAL at 17:39

## 2022-04-03 RX ADMIN — CHLORHEXIDINE GLUCONATE 15 ML: 1.2 RINSE ORAL at 21:18

## 2022-04-03 RX ADMIN — ALBUMIN (HUMAN) 12.5 G: 12.5 INJECTION, SOLUTION INTRAVENOUS at 14:26

## 2022-04-03 NOTE — PROGRESS NOTES
0930: Patient arrived to unit ambulatory, care of patient assumed. 1030: IP consult to Cardiac anesthesia called. 1310: MD Smith at bedside, placed R MAC and Gaston. Chest X Ray ordered. 1930: Bedside and Verbal shift change report given to [facility] (oncoming nurse) by Fidelia Fisher RN (offgoing nurse). Report included the following information SBAR, Kardex, Intake/Output, Recent Results, Med Rec Status, Cardiac Rhythm NSR with Freq PVCs and Alarm Parameters .

## 2022-04-03 NOTE — H&P
[facility] in [place], [place]  Heart Failure History and Physical    Patient name: Herminia Sawant Sr  Patient : 1945  Patient MRN: [id number]  Date of service: 22    CHIEF COMPLAINT:  HFrEF     PLAN OF CARE:   · Severe ischemic cardiomyopathy, LVEF 15-20%; stage D, NYHA class IV symptoms; patient unerwent LVAD-DT evaluation; approved for implant by MRB   · Has been maintained OP on chronic infusion of milrinone while optimizing nutritional status and muscle conditioning   · Electively admitted 4/3 for hemodynamic optimization prior to LVAD implant    · Lines today  · Abx ordered   · Blood products ordered   · Consents tomorrow  · No Vitamin K     RECOMMENDATIONS:  Place PAC for hemodynamic optimization   Continue milrinone 0.375 mcg/kg/min  Intolerant to Coreg due to RV dysfunction   ACEi/ARB/ARNi/MRA discontinued in anticipation of cardiac surgery  Continue current dose of Hydralazine 50 TID; hold Imdur in anticipation of Loren   Adjust diuretics based upon PAC   Continue ASA 81 mg daily   Effient d/c'd 3/27; confirmed patient has not taken   Continue current dose of rosuvastatin 10 mg daily  Continue ranolazine 500 mg BID (for HR and angina control)  Begin bowel regimen today   SSI   Continue CPAP therapy IP   HF labs   Advanced care plan forms completed  Palliative Care consult per routine tomorrow    Genetic test negative   PYP negative   Strict I/O, daily weights, Na+ restricted diet   Begin VAD education     IMPRESSION:  Fatigue at rest  Shortness of breath with exertion  Acute on chronic systolic heart failure  · Stage D, NYHA class IV symptoms improved to class II on inotropes  · Non-ischemic cardiomyopathy, LVEF 20% with LVEDD 6.2  · RV dysfunction, TAPSE 1.22 (prelimnary read)  · TBili 1.5 likely from cardiac congestion  At risk of sudden cardiac death  · Recent cardiac arrest   · S/p ICD (2013, [place] followed by [facility]);  New implant on 10/21/2021 [employer]  Coronary artery disease  · S/p multiple interventions  · S/p 4V CABG (8/2012)  · LHC (7/2019) severe stenosis of LIMA to LAD anastomosis site, SVG graft to OM is occluded, SVG graft to RCA 40-50%, LAD occluded proximally, severe proximal LCx, RCA is the long . · PET (6/2019) EF 24% with anterior lateral and inferior reversible defect  Cardiac risk factors  · HTN  · HL  · DM2  · SRUTHI on CPAP  · MIld carotid stenosis  Anemia, microcytic  · Iron deficiency  DVT with filter  Pulmonary nodules   Dysphagia     CARDIAC IMAGING:  Echo (3/2/22)   · LV 10-15%  · Mod-severe MR     Echo (11/23/21):  · LV 15-20%. · Mod-severe, MR, mod-TR    Echo (5/20/21)  · LV: Estimated LVEF is 20 - 25%. Normal wall thickness. Mildly dilated left ventricle. Severely and globally reduced systolic function. Severe (grade 4) left ventricular diastolic dysfunction. · LA: Severely dilated left atrium. · RA: Severely dilated right atrium. · MV: Moderate mitral valve regurgitation is present. · TV: Moderate tricuspid valve regurgitation is present. · AO: Mild aortic root dilatation. · RV: Pacer/ICD present. · LVED 6.2cm    EKG (5/20/21) SB with 1degree AV block, QRS 116ms     [facility] (5/24/21) Native Coronaries: LM - moderate to severe distal disease 50%. % prox occluded (), heavily calcified, LCx: 80% proximal stenosis. OM1 is  (seen filling faintly via collaterals). OM2 99% stenosis. RCA: 100% proximal occluded.  LIMA to LAD: patent, supplies only a diagonal branch (probably D2).  The LAD distal to the bifurcation is 100% occluded () and fills via right to left collaterals off the SVG to RCA. SVG to R-PDA: patent with moderate diffuse irregularities but no obstructive disease in the graft.    No appealing interventional targets.      NST as above     ICD Interrogation (11/12/2021) [employer] VVI, thoracic impedence trending up, no events, normal device function and good battery  ICD interrogation (5/12/21) Abcodia single lead, no events, normal device function and good battery     HEMODYNAMICS:  RHC 5/24/21 CI 1.97, PA 33/9/17, RA 1, PCWP 6- off milrinone   CPEST not done     Patient underwent a 6 minute walk test 11/12/2021    6 Min Walk Report 11/12/2021 7/6/2021 5/20/2021   (PRE) HR 88 98 74   (PRE) O2 Sat 100 - 99   (POST)  - 81   (POST) O2 Sat 99 98% on Ra 99   Distance in Meters 386.58 - 1158.24         OTHER IMAGING:  CXR (6/17/21) clear  CT 5/21/21 1. Irregular pulmonary nodule in the left upper lobe measuring 2 cm, suspicious for primary pulmonary malignancy. 2. Additional 6 mm left upper lobe pulmonary nodule. 3. Severe calcific atherosclerosis of the coronary arteries and abdominal aorta. No aneurysm. 4. Cardiomegaly. 5. No acute abnormality within the chest, abdomen, or pelvis. HISTORY OF PRESENT ILLNESS:  Briefly, Zabrina Goyal is a 69 y.o. male with h/o HTN, HL, DM2, SRUTHI on CPAP, chronic systolic heart failure, stage D, NYHA class IV symptoms, non-ischemic cardiomyopathy, LVEF 20% with LVEDD 6.2, RV dysfunciton, TAPSE 1.22, TBili 1.5 likely from cardiac congestion, recent cardiac arrest s/p ICD (1/2013, [employer] followed by [facility]), coronary artery disease s/p multiple interventions s/p 4V CABG (8/2012), C (7/2019) severe stenosis of LIMA to LAD anastomosis site, SVG graft to OM is occluded, SVG graft to RCA 40-50%, LAD occluded proximally, severe proximal LCx, RCA is the long . PET (6/2019) EF 24% with anterior lateral and inferior reversible defect. Anemia, microcytic with iron deficiency, DVT with filter.     [facility] Dr. James Worthington for evaluation of his candidacy for advanced therapies.     Patient agreed to inpatient initiation of palliative infusion of chronic inotropes and evaluation for LVAD-DT. Patient was admitted 5/2-5/25/21.  Patient was started on milrinone infusion and had first part of LVAD evaluation completed. Right and left heart cath on 5/24/21 that showed severe diffuse native vessel CAD, with patent grafts (LIMA to D2, SVG to RCA).  Antiplatelet therapy was held during hospitalization and after discharge due to anticipated pulmonary nodule biopsy, bone marrow biopsy and EGD/C-Scope as part of LVAD workup.     Patient was readmitted 5/26-5/28/21 with hypertension, headache and chest pain, his troponin peaked at 10; he was shortly bridged with heparin, otherwise had normal EKG and chest CT negative for PE. Cardiology and AHF service was consulted and patient was discharged home after troponin came down.     Patient has now completed radiation treatment for adenocarcinoma of the lung. Hem-onc has cleared patient for VAD implant with 90% 2 year prognosis. He was most recently admitted for elective pre-op EGD and colonoscopy which he tolerated well; path negative for malignancy. He presents today for elective admission to [facility] for hemodynamic optimization prior to LVAD implant. REVIEW OF SYSTEMS:  Review of Systems   Constitutional: Positive for weight loss. HENT: Negative. Eyes: Negative. Respiratory: Positive for shortness of breath. Cardiovascular: Negative for chest pain, palpitations and claudication. Gastrointestinal: Positive for constipation. Genitourinary: Negative. Musculoskeletal: Negative. Skin: Negative. Neurological: Negative for dizziness, focal weakness and weakness. Psychiatric/Behavioral: Negative. PHYSICAL EXAM:  Visit Vitals  /74 (BP 1 Location: Left upper arm, BP Patient Position: At rest)   Pulse 86   Temp 97.4 °F (36.3 °C)   Resp 18   Wt 166 lb 0.1 oz (75.3 kg)   BMI 21.90 kg/m²     Physical Exam  Constitutional:       Appearance: He is ill-appearing. Comments: frail   HENT:      Head: Normocephalic and atraumatic. Eyes:      Extraocular Movements: Extraocular movements intact.       Conjunctiva/sclera: Conjunctivae normal. Cardiovascular:      Rate and Rhythm: Normal rate and regular rhythm. Pulses: Normal pulses. Pulmonary:      Breath sounds: Examination of the left-lower field reveals decreased breath sounds. Decreased breath sounds present. Abdominal:      General: Abdomen is flat. Bowel sounds are normal.      Palpations: Abdomen is soft. Musculoskeletal:      Cervical back: Normal range of motion and neck supple. Right lower leg: No edema. Left lower leg: No edema. Skin:     General: Skin is warm and dry. Neurological:      General: No focal deficit present. Mental Status: He is alert and oriented to person, place, and time. Psychiatric:         Mood and Affect: Mood normal.          PAST MEDICAL HISTORY:  Past Medical History:   Diagnosis Date    CAD (coronary artery disease) '90 '97, '13 x 2    [facility]    Calculus of kidney     Colonic polyps     Congestive heart failure, unspecified     Diabetes ([facility] [address]     Gastritis     Hypercholesterolemia     Sleep apnea        PAST SURGICAL HISTORY:  Past Surgical History:   Procedure Laterality Date    COLONOSCOPY  04/06/2011    16, due 21    COLONOSCOPY N/A 3/2/2022    COLONOSCOPY    :- performed by Grey [facility]u [facility] at [facility] ENDOSCOPY    ENDOSCOPY, COLON, DIAGNOSTIC      11, due 16    HX CORONARY ARTERY BYPASS GRAFT  8/22/12    x 4 vessel by S.  James    HX HEENT      LASIK    HX PACEMAKER PLACEMENT  1/30/13    [place] CARDIAC SURG PROCEDURE UNLIST  2012    x 4 vessels       FAMILY HISTORY:  Family History   Problem Relation Age of Onset    Heart Disease Mother         MI    Heart Disease Father         CAD & PVD    Lung Disease Father     Cancer Father         lung    Diabetes Maternal Grandmother     Heart Disease Other         CAD    Stroke Sister        SOCIAL HISTORY:  Social History     Socioeconomic History    Marital status:    Tobacco Use    Smoking status: Never Smoker    Smokeless tobacco: Never Used   Vaping Use    Vaping Use: Never used   Substance and Sexual Activity    Alcohol use: Yes     Alcohol/week: 0.0 standard drinks     Comment:  VERY RARE    Drug use: No       LABORATORY RESULTS:  Labs Latest Ref Rng & Units 3/4/2022 3/3/2022 3/2/2022 3/1/2022 2/5/2022 2/4/2022 2/3/2022   WBC 4.1 - 11.1 K/uL 2. 5(L) 2. 5(L) 2. 9(L) 3.0(L) 3.4(L) 3. 3(L) 2. 6(L)   RBC 4.10 - 5.70 M/uL 4.47 4.57 4.95 5.04 4.48 4.42 4.38   Hemoglobin 12.1 - 17.0 g/dL 10. 7(L) 10. 9(L) 11. 7(L) 11. 8(L) 10. 2(L) 10. 2(L) 10. 1(L)   Hematocrit 36.6 - 50.3 % 33. 7(L) 34. 7(L) 37.2 37.7 32. 4(L) 31. 9(L) 31. 4(L)   MCV 80.0 - 99.0 FL 75. 4(L) 75. 9(L) 75. 2(L) 74. 8(L) 72. 3(L) 72. 2(L) 71. 7(L)   Platelets 678 - 796 K/uL 120(L) 120(L) 140(L) 154 135(L) 125(L) 123(L)   Lymphocytes 12 - 49 % 27 26 26 18 19 26 29   Monocytes 5 - 13 % 13 12 12 14(H) 14(H) 15(H) 15(H)   Eosinophils 0 - 7 % 6 3 3 2 2 3 4   Basophils 0 - 1 % 1 0 1 1 1 1 1   Albumin 3.5 - 5.0 g/dL 2. 8(L) 2. 8(L) 3.0(L) 3.6 3.3(L) 3. 2(L) 2. 9(L)   Calcium 8.5 - 10.1 MG/DL 8.4(L) 8.8 9.0 9.4 9.4 8.8 8.6   Glucose 65 - 100 mg/dL 112(H) 105(H) 94 134(H) 46(LL) 66 101(H)   BUN 6 - 20 MG/DL 14 11 16 21(H) 20 22(H) 20   Creatinine 0.70 - 1.30 MG/DL 1.05 1.01 1.03 1.18 1.30 1.24 1.15   Sodium 136 - 145 mmol/L 136 136 132(L) 132(L) 136 137 138   Potassium 3.5 - 5.1 mmol/L 3.5 3. 4(L) 3.6 3.6 3.6 3.9 3.8   TSH 0.36 - 3.74 uIU/mL - - - 1.68 - - -   LDH 85 - 241 U/L - - 145 - - - -   Some recent data might be hidden       ALLERGY:  Allergies   Allergen Reactions    Oxycodone Anaphylaxis    Pcn [Penicillins] Hives        CURRENT MEDICATIONS:    Current Facility-Administered Medications:     aspirin delayed-release tablet 81 mg, 81 mg, Oral, DAILY, Chris Wong NP    hydrALAZINE (APRESOLINE) tablet 50 mg, 50 mg, Oral, TID, Chris Wong NP    milrinone (PRIMACOR) 20 MG/100 ML D5W infusion, 0.375 mcg/kg/min, IntraVENous, CONTINUOUS, Chris Wong NP    [START ON 4/4/2022] pantoprazole (PROTONIX) tablet 40 mg, 40 mg, Oral, ACB, Pernell Wong NP    . PHARMACY TO SUBSTITUTE PER PROTOCOL (Reordered from: ranolazine ER (Ranexa) 500 mg SR tablet), , , Per Protocol, Pernell Wong NP    rosuvastatin (CRESTOR) tablet 10 mg, 10 mg, Oral, QHS, Pernell Wong NP    tamsulosin (FLOMAX) capsule 0.4 mg, 0.4 mg, Oral, DAILY, Pernell Wong NP    sodium chloride (NS) flush 5-40 mL, 5-40 mL, IntraVENous, Q8H, Pernell Wong NP    sodium chloride (NS) flush 5-40 mL, 5-40 mL, IntraVENous, PRN, Pernell Wong NP    0.9% sodium chloride infusion, 9 mL/hr, IntraVENous, CONTINUOUS, Pernell Wong NP    acetaminophen (TYLENOL) tablet 650 mg, 650 mg, Oral, Q4H PRN, Pernell Wong NP  Love  [START ON 4/4/2022] mupirocin (BACTROBAN) 2 % ointment 1 g, 1 g, Both Nostrils, BID, Pernell Wong NP    chlorhexidine (PERIDEX) 0.12 % mouthwash 15 mL, 15 mL, Oral, Q12H, Pernell Wong NP  Love  [START ON 4/5/2022] rifAMPin (RIFADIN) 600 mg in 0.9% sodium chloride 100 mL IVPB, 600 mg, IntraVENous, ONCE, Pernell Wong NP    [START ON 4/5/2022] fluconazole (DIFLUCAN) 200mg/100 mL IVPB (premix), 200 mg, IntraVENous, ONCE, Pernell Wong NP  Love  [START ON 4/5/2022] levoFLOXacin (LEVAQUIN) 500 mg in D5W IVPB, 500 mg, IntraVENous, ONCE, Pernell Wong NP      Thank you for your referral and allowing me to participate in this patient's care.     Carlos Dobbins NP  Heart Failure Nurse Practitioner   Nicolas Whitaker [phone], [facility] [address] / [place]: [phone]/ F: [phone]

## 2022-04-03 NOTE — PROCEDURES
Central Line Procedure Note    Indication: LVAD workup, lines requested by primary team/Dr. Nayan Baez    Risks, benefits, alternatives explained and patient agrees to proceed. Patient positioned in Trendelenburg. 7-Step Sterility Protocol followed (cap, mask sterile gown, sterile gloves, large sterile sheet, hand hygiene, 2% chlorhexidine for cutaneous antisepsis). 5 mL 1% Lidocaine placed at insertion site. Right internal jugular cannulated x 1 attempt utilizing the Seldinger technique and ultrasound guidance. Venous cannulation confirmed with column drop test.    Catheter secured & Biopatch applied. Sterile throughout. Sterile Tegaderm placed. Patient without complaints. PAC floated without event, discussed results with primary team.  CXR pending. Detail Level: Detailed Size Of Lesion: 2 mm Size Of Lesion: 1mm Size Of Lesion: 4mm x 4mm Size Of Lesion: 3mm Size Of Lesion: 5mm x 3mm Size Of Lesion: 4mm x 3mm Size Of Lesion: 5 x 3 mm Size Of Lesion: 2mm

## 2022-04-04 ENCOUNTER — ANESTHESIA EVENT (OUTPATIENT)
Dept: CARDIOTHORACIC SURGERY | Age: 77
DRG: 001 | End: 2022-04-04
Payer: MEDICARE

## 2022-04-04 ENCOUNTER — TELEPHONE (OUTPATIENT)
Dept: CARDIOLOGY CLINIC | Age: 77
End: 2022-04-04

## 2022-04-04 LAB
ALBUMIN SERPL-MCNC: 3 G/DL (ref 3.5–5)
ALBUMIN/GLOB SERPL: 0.9 {RATIO} (ref 1.1–2.2)
ALP SERPL-CCNC: 140 U/L (ref 45–117)
ALT SERPL-CCNC: 14 U/L (ref 12–78)
ANION GAP SERPL CALC-SCNC: 4 MMOL/L (ref 5–15)
APPEARANCE UR: CLEAR
AST SERPL-CCNC: 41 U/L (ref 15–37)
ATRIAL RATE: 71 BPM
BACTERIA URNS QL MICRO: NEGATIVE /HPF
BDY SITE: ABNORMAL
BILIRUB SERPL-MCNC: 0.6 MG/DL (ref 0.2–1)
BILIRUB UR QL: NEGATIVE
BNP SERPL-MCNC: 3217 PG/ML
BUN SERPL-MCNC: 21 MG/DL (ref 6–20)
BUN/CREAT SERPL: 19 (ref 12–20)
CALCIUM SERPL-MCNC: 8.7 MG/DL (ref 8.5–10.1)
CALCULATED R AXIS, ECG10: 52 DEGREES
CALCULATED T AXIS, ECG11: 88 DEGREES
CHLORIDE SERPL-SCNC: 101 MMOL/L (ref 97–108)
CO2 SERPL-SCNC: 29 MMOL/L (ref 21–32)
COHGB MFR BLD: 0.8 % (ref 1–2)
COLOR UR: ABNORMAL
CREAT SERPL-MCNC: 1.1 MG/DL (ref 0.7–1.3)
DIAGNOSIS, 93000: NORMAL
EPITH CASTS URNS QL MICRO: ABNORMAL /LPF
ERYTHROCYTE [DISTWIDTH] IN BLOOD BY AUTOMATED COUNT: 25 % (ref 11.5–14.5)
EST. AVERAGE GLUCOSE BLD GHB EST-MCNC: 148 MG/DL
GLOBULIN SER CALC-MCNC: 3.5 G/DL (ref 2–4)
GLUCOSE BLD STRIP.AUTO-MCNC: 103 MG/DL (ref 65–117)
GLUCOSE BLD STRIP.AUTO-MCNC: 105 MG/DL (ref 65–117)
GLUCOSE BLD STRIP.AUTO-MCNC: 134 MG/DL (ref 65–117)
GLUCOSE SERPL-MCNC: 107 MG/DL (ref 65–100)
GLUCOSE UR STRIP.AUTO-MCNC: >1000 MG/DL
HBA1C MFR BLD: 6.8 % (ref 4–5.6)
HCT VFR BLD AUTO: 36.9 % (ref 36.6–50.3)
HGB BLD OXIMETRY-MCNC: 12.8 G/DL (ref 14–17)
HGB BLD-MCNC: 11.9 G/DL (ref 12.1–17)
HGB UR QL STRIP: NEGATIVE
HYALINE CASTS URNS QL MICRO: ABNORMAL /LPF (ref 0–5)
INR PPP: 1.1 (ref 0.9–1.1)
KETONES UR QL STRIP.AUTO: NEGATIVE MG/DL
LEUKOCYTE ESTERASE UR QL STRIP.AUTO: NEGATIVE
MAGNESIUM SERPL-MCNC: 2.3 MG/DL (ref 1.6–2.4)
MAGNESIUM SERPL-MCNC: 2.3 MG/DL (ref 1.6–2.4)
MCH RBC QN AUTO: 24.8 PG (ref 26–34)
MCHC RBC AUTO-ENTMCNC: 32.2 G/DL (ref 30–36.5)
MCV RBC AUTO: 77 FL (ref 80–99)
METHGB MFR BLD: 0.1 % (ref 0–1.4)
NITRITE UR QL STRIP.AUTO: NEGATIVE
NRBC # BLD: 0 K/UL (ref 0–0.01)
NRBC BLD-RTO: 0 PER 100 WBC
OXYHGB MFR BLD: 61.1 % (ref 94–97)
P-R INTERVAL, ECG05: 216 MS
PH UR STRIP: 5.5 [PH] (ref 5–8)
PLATELET # BLD AUTO: 137 K/UL (ref 150–400)
POTASSIUM SERPL-SCNC: 3.6 MMOL/L (ref 3.5–5.1)
POTASSIUM SERPL-SCNC: 4.1 MMOL/L (ref 3.5–5.1)
PROT SERPL-MCNC: 6.5 G/DL (ref 6.4–8.2)
PROT UR STRIP-MCNC: 30 MG/DL
PROTHROMBIN TIME: 11.5 SEC (ref 9–11.1)
Q-T INTERVAL, ECG07: 428 MS
QRS DURATION, ECG06: 90 MS
QTC CALCULATION (BEZET), ECG08: 465 MS
RBC # BLD AUTO: 4.79 M/UL (ref 4.1–5.7)
RBC #/AREA URNS HPF: ABNORMAL /HPF (ref 0–5)
SAO2 % BLD: 62 % (ref 95–99)
SERVICE CMNT-IMP: ABNORMAL
SERVICE CMNT-IMP: ABNORMAL
SERVICE CMNT-IMP: NORMAL
SERVICE CMNT-IMP: NORMAL
SODIUM SERPL-SCNC: 134 MMOL/L (ref 136–145)
SP GR UR REFRACTOMETRY: 1.02
SPECIMEN SITE: ABNORMAL
UROBILINOGEN UR QL STRIP.AUTO: 1 EU/DL (ref 0.2–1)
VENTRICULAR RATE, ECG03: 71 BPM
WBC # BLD AUTO: 3.5 K/UL (ref 4.1–11.1)
WBC URNS QL MICRO: ABNORMAL /HPF (ref 0–4)

## 2022-04-04 PROCEDURE — 85027 COMPLETE CBC AUTOMATED: CPT

## 2022-04-04 PROCEDURE — 74011250636 HC RX REV CODE- 250/636: Performed by: NURSE PRACTITIONER

## 2022-04-04 PROCEDURE — 83735 ASSAY OF MAGNESIUM: CPT

## 2022-04-04 PROCEDURE — 82962 GLUCOSE BLOOD TEST: CPT

## 2022-04-04 PROCEDURE — 93005 ELECTROCARDIOGRAM TRACING: CPT

## 2022-04-04 PROCEDURE — 65610000003 HC RM ICU SURGICAL

## 2022-04-04 PROCEDURE — 82375 ASSAY CARBOXYHB QUANT: CPT

## 2022-04-04 PROCEDURE — 77030027138 HC INCENT SPIROMETER -A

## 2022-04-04 PROCEDURE — 83036 HEMOGLOBIN GLYCOSYLATED A1C: CPT

## 2022-04-04 PROCEDURE — 99292 CRITICAL CARE ADDL 30 MIN: CPT | Performed by: THORACIC SURGERY (CARDIOTHORACIC VASCULAR SURGERY)

## 2022-04-04 PROCEDURE — 85610 PROTHROMBIN TIME: CPT

## 2022-04-04 PROCEDURE — 74011250637 HC RX REV CODE- 250/637: Performed by: NURSE PRACTITIONER

## 2022-04-04 PROCEDURE — 99231 SBSQ HOSP IP/OBS SF/LOW 25: CPT | Performed by: CLINICAL NURSE SPECIALIST

## 2022-04-04 PROCEDURE — 74011000258 HC RX REV CODE- 258: Performed by: NURSE PRACTITIONER

## 2022-04-04 PROCEDURE — 99291 CRITICAL CARE FIRST HOUR: CPT | Performed by: THORACIC SURGERY (CARDIOTHORACIC VASCULAR SURGERY)

## 2022-04-04 PROCEDURE — 83880 ASSAY OF NATRIURETIC PEPTIDE: CPT

## 2022-04-04 PROCEDURE — 36415 COLL VENOUS BLD VENIPUNCTURE: CPT

## 2022-04-04 PROCEDURE — 99221 1ST HOSP IP/OBS SF/LOW 40: CPT | Performed by: PHYSICAL MEDICINE & REHABILITATION

## 2022-04-04 PROCEDURE — 74011250637 HC RX REV CODE- 250/637: Performed by: STUDENT IN AN ORGANIZED HEALTH CARE EDUCATION/TRAINING PROGRAM

## 2022-04-04 PROCEDURE — 74011000250 HC RX REV CODE- 250: Performed by: NURSE PRACTITIONER

## 2022-04-04 PROCEDURE — 99233 SBSQ HOSP IP/OBS HIGH 50: CPT | Performed by: INTERNAL MEDICINE

## 2022-04-04 PROCEDURE — P9045 ALBUMIN (HUMAN), 5%, 250 ML: HCPCS | Performed by: NURSE PRACTITIONER

## 2022-04-04 PROCEDURE — 80053 COMPREHEN METABOLIC PANEL: CPT

## 2022-04-04 RX ORDER — ALBUMIN HUMAN 50 G/1000ML
25 SOLUTION INTRAVENOUS ONCE
Status: DISCONTINUED | OUTPATIENT
Start: 2022-04-05 | End: 2022-04-05 | Stop reason: HOSPADM

## 2022-04-04 RX ORDER — MAGNESIUM SULFATE HEPTAHYDRATE 40 MG/ML
2 INJECTION, SOLUTION INTRAVENOUS ONCE
Status: DISCONTINUED | OUTPATIENT
Start: 2022-04-05 | End: 2022-04-05 | Stop reason: HOSPADM

## 2022-04-04 RX ORDER — PROTAMINE SULFATE 10 MG/ML
500 INJECTION, SOLUTION INTRAVENOUS ONCE
Status: DISCONTINUED | OUTPATIENT
Start: 2022-04-05 | End: 2022-04-05 | Stop reason: HOSPADM

## 2022-04-04 RX ORDER — POTASSIUM CHLORIDE 29.8 MG/ML
20 INJECTION INTRAVENOUS ONCE
Status: DISCONTINUED | OUTPATIENT
Start: 2022-04-05 | End: 2022-04-05 | Stop reason: HOSPADM

## 2022-04-04 RX ORDER — NITROGLYCERIN 20 MG/100ML
0-20 INJECTION INTRAVENOUS
Status: DISCONTINUED | OUTPATIENT
Start: 2022-04-05 | End: 2022-04-05

## 2022-04-04 RX ORDER — ALBUMIN HUMAN 50 G/1000ML
12.5 SOLUTION INTRAVENOUS EVERY 12 HOURS
Status: DISCONTINUED | OUTPATIENT
Start: 2022-04-04 | End: 2022-04-05

## 2022-04-04 RX ORDER — INSULIN LISPRO 100 [IU]/ML
INJECTION, SOLUTION INTRAVENOUS; SUBCUTANEOUS
Status: DISCONTINUED | OUTPATIENT
Start: 2022-04-04 | End: 2022-04-05

## 2022-04-04 RX ORDER — HEPARIN SOD,PORCINE/0.9 % NACL 30K/1000ML
50-1000 INTRAVENOUS SOLUTION INTRAVENOUS AS NEEDED
Status: DISCONTINUED | OUTPATIENT
Start: 2022-04-05 | End: 2022-04-05 | Stop reason: HOSPADM

## 2022-04-04 RX ORDER — POTASSIUM CHLORIDE 750 MG/1
40 TABLET, FILM COATED, EXTENDED RELEASE ORAL
Status: COMPLETED | OUTPATIENT
Start: 2022-04-04 | End: 2022-04-04

## 2022-04-04 RX ORDER — DEXMEDETOMIDINE HYDROCHLORIDE 4 UG/ML
.1-1.5 INJECTION, SOLUTION INTRAVENOUS
Status: DISCONTINUED | OUTPATIENT
Start: 2022-04-05 | End: 2022-04-11

## 2022-04-04 RX ORDER — DOBUTAMINE HYDROCHLORIDE 400 MG/100ML
0-10 INJECTION INTRAVENOUS
Status: DISCONTINUED | OUTPATIENT
Start: 2022-04-05 | End: 2022-04-22

## 2022-04-04 RX ADMIN — TAMSULOSIN HYDROCHLORIDE 0.4 MG: 0.4 CAPSULE ORAL at 09:05

## 2022-04-04 RX ADMIN — CHLORHEXIDINE GLUCONATE 15 ML: 1.2 RINSE ORAL at 22:18

## 2022-04-04 RX ADMIN — IRON SUCROSE 200 MG: 20 INJECTION, SOLUTION INTRAVENOUS at 11:17

## 2022-04-04 RX ADMIN — POLYETHYLENE GLYCOL 3350 17 G: 17 POWDER, FOR SOLUTION ORAL at 09:06

## 2022-04-04 RX ADMIN — ALBUMIN (HUMAN) 12.5 G: 12.5 INJECTION, SOLUTION INTRAVENOUS at 21:19

## 2022-04-04 RX ADMIN — MUPIROCIN 1 G: 20 OINTMENT TOPICAL at 22:18

## 2022-04-04 RX ADMIN — CHLORHEXIDINE GLUCONATE 15 ML: 1.2 RINSE ORAL at 09:40

## 2022-04-04 RX ADMIN — ASPIRIN 81 MG: 81 TABLET, COATED ORAL at 09:05

## 2022-04-04 RX ADMIN — SENNOSIDES AND DOCUSATE SODIUM 1 TABLET: 8.6; 5 TABLET ORAL at 09:06

## 2022-04-04 RX ADMIN — MILRINONE LACTATE IN DEXTROSE 0.38 MCG/KG/MIN: 200 INJECTION, SOLUTION INTRAVENOUS at 00:03

## 2022-04-04 RX ADMIN — SODIUM CHLORIDE, PRESERVATIVE FREE 10 ML: 5 INJECTION INTRAVENOUS at 22:20

## 2022-04-04 RX ADMIN — MILRINONE LACTATE IN DEXTROSE 0.38 MCG/KG/MIN: 200 INJECTION, SOLUTION INTRAVENOUS at 20:25

## 2022-04-04 RX ADMIN — SODIUM CHLORIDE, PRESERVATIVE FREE 10 ML: 5 INJECTION INTRAVENOUS at 07:07

## 2022-04-04 RX ADMIN — RANOLAZINE 500 MG: 500 TABLET, FILM COATED, EXTENDED RELEASE ORAL at 09:08

## 2022-04-04 RX ADMIN — HYDRALAZINE HYDROCHLORIDE 50 MG: 50 TABLET, FILM COATED ORAL at 22:18

## 2022-04-04 RX ADMIN — HYDRALAZINE HYDROCHLORIDE 50 MG: 50 TABLET, FILM COATED ORAL at 16:23

## 2022-04-04 RX ADMIN — PANTOPRAZOLE SODIUM 40 MG: 40 TABLET, DELAYED RELEASE ORAL at 07:07

## 2022-04-04 RX ADMIN — RANOLAZINE 500 MG: 500 TABLET, FILM COATED, EXTENDED RELEASE ORAL at 19:10

## 2022-04-04 RX ADMIN — ALBUMIN (HUMAN) 12.5 G: 12.5 INJECTION, SOLUTION INTRAVENOUS at 09:39

## 2022-04-04 RX ADMIN — HYDRALAZINE HYDROCHLORIDE 50 MG: 50 TABLET, FILM COATED ORAL at 09:05

## 2022-04-04 RX ADMIN — MILRINONE LACTATE IN DEXTROSE 0.38 MCG/KG/MIN: 200 INJECTION, SOLUTION INTRAVENOUS at 09:37

## 2022-04-04 RX ADMIN — ROSUVASTATIN 10 MG: 10 TABLET, FILM COATED ORAL at 22:19

## 2022-04-04 RX ADMIN — SODIUM CHLORIDE, PRESERVATIVE FREE 10 ML: 5 INJECTION INTRAVENOUS at 14:34

## 2022-04-04 RX ADMIN — POTASSIUM CHLORIDE 40 MEQ: 750 TABLET, EXTENDED RELEASE ORAL at 01:03

## 2022-04-04 NOTE — DIABETES MGMT
[facility]    CLINICAL NURSE SPECIALIST CONSULT     Initial Presentation   Kavitha Khoury is a 69 y.o. male who was admitted 4/3/22 for medical optimization of severe ischemic cardiomyopathy with heart failure with reduced ejection fraction prior to LVAD implantation. HX:   Past Medical History:   Diagnosis Date    CAD (coronary artery disease) '90 '97, '13 x 2    [place] [facility]    Calculus of kidney     Colonic polyps     Congestive heart failure, unspecified     [facility] [address]     Gastritis     Hypercholesterolemia     Sleep apnea         INITIAL DX:   HFrEF (heart failure with reduced ejection fraction) ([facility] [address] [I50.20]     Current Treatment     TX: Diuretic adjustment, antibiotics, milrinone    Consulted by Malvin Oshea NP for advanced diabetes nursing assessment and care for:   [x] Inpatient management strategy    Hospital Course   Clinical progress has been uncomplicated. Diabetes History   Type 2 Diabetes- A1C 6.8%  Ambulatory BG management provided by: Charito Alba MD PCP  Family history positive for diabetes: Maternal Grandmother with DM2       Diabetes-related Medical History  Neurological complications  Peripheral neuropathy  Macrovascular disease  CAD and Myocardial infarction  Other associated conditions     CHF, Sleep Apnea    Diabetes Medication History  Key Antihyperglycemic Medications             metFORMIN (GLUCOPHAGE) 500 mg tablet (Taking) Take 1 Tablet by mouth two (2) times daily (with meals). dapagliflozin (Farxiga) 10 mg tab tablet (Taking) Take 1 Tablet by mouth daily.            Diabetes self-management practices:   Eating pattern  [x]? Breakfast         Eggs, toast, oatmeal  [x]? Lunch              Sometimes skips or piece of fruit  [x]? Dinner              Chicken/steak with a sweet potato or bowl of soup  [x]? Bedtime           Fruit  [x]? Snacks            Fruit  [x]? Beverages       Water  Physical activity pattern  Limited with HF  Monitoring pattern  Tests 2-3 times weekly  When testing, will see 106-135  Taking medications pattern  [x]? Consistent administration  [x]? Affordable  Social determinants of health impacting diabetes self-management practices   Concerned that you need to know more about how to stay healthy with diabetes  Overall evaluation:               [x]? Achieving A1c target with drug therapy & self-care practices    Subjective   I am looking at this LVAD magazine.       On home inotropes since Nov  Objective   Physical exam  General           Normal weight male in no acute distress/ill-appearing. Conversant and cooperative  Neuro  Alert, oriented   Vital Signs   Visit Vitals  /89   Pulse 85   Temp 97.8 °F (36.6 °C)   Resp 21   Wt 75.9 kg (167 lb 5.3 oz)   SpO2 99%   BMI 22.08 kg/m²     Skin  Warm and dry. No acanthosis noted along neckline. No lipohypertrophy or lipoatrophy noted at injection sites   Heart   Regular rate and rhythm.  No murmurs, rubs or gallops  Lungs  Clear to auscultation without rales or rhonchi  Extremities No foot wounds      Laboratory  Recent Labs     04/04/22  0338 04/03/22  1527   * 132*   AGAP 4* 3*   WBC 3.5* 3.5*   CREA 1.10 1.17   GFRNA >60 >60   AST 41* 46*   ALT 14 17       Factors impacting BG management  Factor Dose Comments   Nutrition:  Standard meals       60 grams/meal        CHF On chronic milrinone Impaired insulin delivery         Blood glucose pattern      Significant diabetes-related events over the past 24-72 hours  A1C 6.8%  LVAD tomorrow    Assessment and Plan   Nursing Diagnosis Risk for unstable blood glucose pattern   Nursing Intervention Domain 5251 Decision-making Support   Nursing Interventions Examined current inpatient diabetes/blood glucose control   Explored factors facilitating and impeding inpatient management  Explored corrective strategies with patient and responsible inpatient provider   Informed patient of rational for insulin strategy while hospitalized     Evaluation   Rajinder Barba is a 69year old gentleman, with controlled Type 2 Diabetes, admitted for medical optimization for severe ischemic cardiomyopathy and heart failure with reduced ejection fraction prior to LVAD implant. A1C on admission was 6.8% and BG has been at goal without the need for anti-hyperglycemic agents. Please continue current therapy and start an insulin gtt per CTS protocol with LVAD implant. Recommendations   1. POC glucose ACHS  2. Consistent carbohydrate diet (60 grams CHO/meal)  3. Correctional humalog ACHS at normal sensitivity  4. If BG sustained over 180mg/dl today, start low dose Lantus: 0.2 units/kg/day: 15 units  5. Transition to insulin gtt per CTS protocol tomorrow with LVAD implant. Billing Code(s)   [x] 86225    Before making these care recommendations, I personally reviewed the hospitalization record, including notes, laboratory & diagnostic data and current medications, and examined the patient at the bedside (circumstances permitting) before making care recommendations. More than fifty (50) percent of the time was spent in patient counseling and/or care coordination.   Total minutes: 13      FAROOQ Nava  Diabetes Clinical Nurse Specialist  [facility] Diabetes [facility]  Access via [employer]

## 2022-04-04 NOTE — PROGRESS NOTES
Transitions of Care Plan   RUR: 18% - moderate   Clinical Update: LVAD implant scheduled for tomorrow   Consults: Multiple   Baseline: independent; resides with wife   Barriers to Discharge: medical   Disposition: [facility] - [facility]    Estimated Discharge Date: 2+ days    Reason for Admission:   LVAD Implant                  RUR Score:     18% - moderate             PCP: First and Last name:   Karena Hough MD     Name of Practice:    Are you a current patient: Yes/No:    Approximate date of last visit:    Can you participate in a virtual visit if needed:     Do you (patient/family) have any concerns for transition/discharge? No              Plan for utilizing home health:   Change from [facility] to [facility] - INPATIENT (LVAD Implant followed by [facility] MD)    Current Advanced Directive/Advance Care Plan:  Full Code      Healthcare Decision Maker:   Click here to complete [place] including selection of the Healthcare Decision Maker Relationship (ie \"Primary\")            Primary Decision MakerBmisael [facility] - [phone]    Secondary Decision Maker: Maria De Jesus Russell - Daughter - [phone]    Transition of Care Plan:          Patient is independent at baseline. Resides with wife; daughter also able to provide support. CM will continue to follow for post surgical needs. Ayan Tucker, MPH  Care Manager [facility]  Available via [employer] or      Care Management Interventions  PCP Verified by CM:  Yes  Palliative Care Criteria Met (RRAT>21 & CHF Dx)?: No  Transition of Care Consult (CM Consult): [address]: Yes  MyChart Signup: Yes  Discharge Durable Medical Equipment: No  Health Maintenance Reviewed: Yes  Physical Therapy Consult: Yes  Occupational Therapy Consult: Yes  Speech Therapy Consult: No  Support Systems: Spouse/Significant Other  Confirm Follow Up Transport: Family  The Plan for Transition of Care is Related to the Following Treatment Goals : Home Health  The Patient and/or Patient Representative was Provided with a Choice of Provider and Agrees with the Discharge Plan?: Yes  Name of the Patient Representative Who was Provided with a Choice of Provider and Agrees with the Discharge Plan: Chano Arnett.   [place] of Choice List was Provided with Basic Dialogue that Supports the Patient's Individualized Plan of Care/Goals, Treatment Preferences and Shares the Quality Data Associated with the Providers?: Yes  Discharge Location  Patient Expects to be Discharged to[de-identified] Home with home health

## 2022-04-04 NOTE — PROGRESS NOTES
2000: Bedside and Verbal shift change report given to Poornima Winters RN (oncoming nurse) by Arben Mendez RN (offgoing nurse). Report included the following information SBAR, Kardex, Intake/Output, MAR, Accordion, Recent Results, Med Rec Status, Cardiac Rhythm SR w/ PVCs and Alarm Parameters . 2300: CI down to 0.8 via CCO monitoring. Machine changed, CVP and PA pressures re-zeroed re-calibrated. CI shows 1.2; Provider notified. Orders for ABG and SVO2 to calculate [place] to confirm. Pt asymptomatic    2315: CI back up to 2.2 with troubleshooting of CCO monitoring. 0040: Bedside and Verbal report given to Wendolyn Sicard, RN (oncoming nurse) by Poornima Winters RN (offgoing nurse). Report included the following information SBAR, Kardex, Intake/Output, MAR, Accordion, Recent Results, Med Rec Status, Cardiac Rhythm SR w/ PVCs and Alarm Parameters .

## 2022-04-04 NOTE — PROGRESS NOTES
0800: Bedside shift change report given to Debra RILEY RN (oncoming nurse) by Ravin Espinosa (offgoing nurse). Report included the following information SBAR, Intake/Output, MAR, Recent Results, Cardiac Rhythm NSR and Alarm Parameters . 0900: pt given warm prune juice to stimulate BM    1000: Dr. Yariel Vitale and AHF at bedside, plan of care reviewed. Currently unable to obtain SVO2 on hemodynamic monitor d/t overheating of cable    1200: LVAD coordinator at bedside, reviewing education with pt and wife    1215: Dr. Mariola Rader at bedside, orders received to stop milrinone at midnight    1400: SVO2 oximetry cable switched to obtain accurate reading    1600: Dr. Cristopher Singh with anesthesia at bedside    1630: Carline Landrum NP with AHF messaged to determine if ABG needed pre-op. Plan to obtain with arterial line placement in the AM    1635: Lashell Levy, NP with AHF at bedside to obtain consent with patient    1640: Dr. Cristopher Singh called to request ABG with arterial line placement    2000: Bedside shift change report given to [address] [place] [address] Lianna (oncoming nurse) by Odilon Drummond RN (offgoing nurse). Report included the following information SBAR, Intake/Output, MAR, Recent Results, Cardiac Rhythm NSR and Alarm Parameters .

## 2022-04-04 NOTE — PROGRESS NOTES
[facility] in [place], [address] and Consent for Ventricular Assist Device (VAD) Implantation Bridge to Transplant-BTT or Destination Therapy-DT with patient and patient's primary caregiver Silvia Dalton). Patient signed consent and has no further questions. Document scanned in patient's chart. Patient completed [id number] scanned in patient's chart. Briefly reviewed LVAD terms and functions with patient and caregiver. Time given to ask questions. Patient and caregiver had no further questions at this time.  Will continue to follow for ongoing LVAD education.      Jacquie Connell, KIRK  VAD Coordinator

## 2022-04-04 NOTE — NURSE NAVIGATOR
Chart reviewed by Heart Failure Nurse Navigator. Heart Failure database completed. EF:  10/15     ACEi/ARB/ARNi: **    BB: **    Aldosterone Antagonist: **    Obstructive Sleep Apnea Screening: Y/ cpap   Referred to Sleep Medicine:     CRT ICD. NYHA Functional Class **. Heart Failure Teach Back in Patient Education. Heart Failure Avoiding Triggers on Discharge Instructions. Cardiologist: [facility]      Post discharge follow up phone call to be made within 48-72 hours of discharge.

## 2022-04-04 NOTE — PROGRESS NOTES
0040: Bedside verbal report received from [facility]; Pt. Resting comfortably in bed. Assumed care of patient. 0100: Mixed venous sample sent. K 3.6, frequent multifocal pvcs/pacs - replaced w/ 40mEq PO potassium. 0400: pre-op EKG done, PIV placed. 0800: Bedside and Verbal shift change report given to Debra RN (oncoming nurse) by Sheila Barba RN (offgoing nurse). Report included the following information SBAR, Intake/Output, MAR, Recent Results, Cardiac Rhythm NSR, PVC, PACs and Alarm Parameters .

## 2022-04-04 NOTE — CARDIO/PULMONARY
Cardiac Rehab:  LVAD educational folder is at the bedside of [address] [place] [address] Rd with the pt based on a Cardiac Rehab consult. Shira Leone is well known to the OP CR staff as he did the program with HF diagnosis. Educated using teach back method. Shira Leone was able to demonstrate proper use of incentive spirometer, achieving 1800 ml. . Encouraged patient's consideration of participation in a Cardiac Rehab Program, after discharge, to assist with their risk modification and management. Will need to coordinate with physicians for continuing OP CR after recovery from the LVAD. Will need to obtain extension of OP CR from insurance. Shira Leone verbalized understanding with questions answered. Will continue to follow.  Yudith Grey RN

## 2022-04-04 NOTE — CONSULTS
Palliative Medicine Consult  [place]: [phone] ([phone])    Patient Name: Aimee Mackenzie  YOB: 1945    Date of Initial Consult: 4/4/22  Reason for Consult: Care decisions- LVAD work up   Requesting Provider: [address] [place] [address]   Primary Care Physician: Amaris Morel MD     SUMMARY:   Aimee Mackenzie is a 69 y.o. with a past history of CHF w/ EF 15-20%, RV dysfunction, ICD in place [employer], managed by [employer]), CAD s/p CABG x 4 2012, MGUS, SRUTHI on CPAP, HTN, DM  who was admitted on 4/3/2022 from home for medical optimization prior to  LVAD as destination therapy- has been on home milrinone and has already undergone work up during previous hospital stays. Current medical issues leading to Palliative Medicine involvement include: care decisions as part of LVAD eval.     Social: Lives w/ wife Mendel Juarez. They have been together for over 51 years. He has 3 children and she has 2- all involved. One of his son's has autism, in a group home- they see him often. Has AMD on file. PALLIATIVE DIAGNOSES:   1. Dyspnea on exertion   2. Fatigue  3. Goals of care        PLAN:   1. Meet w/ pt and supportive wife of many years, Mendel Schools. 2. Introduce palliative medicine and our team's involvement with every patient on home intropes and LVAD evaluation. Typically we see pt as their work up takes place, but for pt this has already been completed- has gone through many scans/procedures/tests and is ready to have LVAD placed tomorrow. 3. Pt has good family support and good understanding of his situation. 4. Available as needed. Will follow peripherally, but call w/ specific questions/concerns. 5. Initial consult note routed to primary continuity provider and/or primary health care team members  6. Communicated plan of care with: Palliative IDTJus 192 Team incl AHF team      GOALS OF CARE / TREATMENT PREFERENCES:     GOALS OF CARE:  Patient/Health Care Proxy Stated Goals:  Other (comment) (LVAD)    TREATMENT PREFERENCES:   Code Status: Full Code    Patient and family's personal goals include: to get LVAD     Advance Care Planning:  [x] [facility] Interdisciplinary Team has updated the ACP Navigator with Health Care Decision Maker and Patient Capacity      Advance Care Planning 4/3/2022   Patient's Healthcare Decision Maker is: -   Confirm Advance Directive Yes, on file       Medical Interventions: Full interventions       Other:    As far as possible, the palliative care team has discussed with patient / health care proxy about goals of care / treatment preferences for patient. HISTORY:     History obtained from: Pt, chart, family , staff     CHIEF COMPLAINT: fatigue    HPI/SUBJECTIVE:    The patient is:   [x] Verbal and participatory  [] Non-participatory due to: Pt pleasant, NAD at rest, in fair spirits. No pain.      Clinical Pain Assessment (nonverbal scale for severity on nonverbal patients):   Clinical Pain Assessment  Severity: 0          Duration: for how long has pt been experiencing pain (e.g., 2 days, 1 month, years)  Frequency: how often pain is an issue (e.g., several times per day, once every few days, constant)     FUNCTIONAL ASSESSMENT:     Palliative Performance Scale (PPS):  PPS: 80       PSYCHOSOCIAL/SPIRITUAL SCREENING:     Palliative IDT has assessed this patient for cultural preferences / practices and a referral made as appropriate to needs (Cultural Services, Patient Advocacy, Ethics, etc.)    Any spiritual / Worship concerns:  [] Yes /  [x] No   If \"Yes\" to discuss with pastoral care during IDT     Does caregiver feel burdened by caring for their loved one:   [] Yes /  [x] No /  [] No Caregiver Present    If \"Yes\" to discuss with social work during IDT    Anticipatory grief assessment:   [x] Normal  / [] Maladaptive     If \"Maladaptive\" to discuss with social work during IDT    ESAS Anxiety: Anxiety: 0    ESAS Depression: Depression: 0        REVIEW OF SYSTEMS: Positive and pertinent negative findings in ROS are noted above in HPI. The following systems were [x] reviewed / [] unable to be reviewed as noted in HPI  Other findings are noted below. Systems: constitutional, ears/nose/mouth/throat, respiratory, gastrointestinal, genitourinary, musculoskeletal, integumentary, neurologic, psychiatric, endocrine. Positive findings noted below. Modified ESAS Completed by: provider   Fatigue: 3 Drowsiness: 0   Depression: 0 Pain: 0   Anxiety: 0 Nausea: 0   Anorexia: 0 Dyspnea: 2                    PHYSICAL EXAM:     From RN flowsheet:  Wt Readings from Last 3 Encounters:   04/04/22 167 lb 5.3 oz (75.9 kg)   03/31/22 166 lb (75.3 kg)   03/08/22 166 lb 3.2 oz (75.4 kg)     Blood pressure 117/72, pulse 76, temperature 97.8 °F (36.6 °C), resp. rate 20, weight 167 lb 5.3 oz (75.9 kg), SpO2 97 %.     Pain Scale 1: Numeric (0 - 10)  Pain Intensity 1: 1  Pain Onset 1: line insertion  Pain Location 1: Neck  Pain Orientation 1: Right  Pain Description 1: Aching  Pain Intervention(s) 1: Rest,Repositioned  Last bowel movement, if known:     Constitutional: awake, alert, oriented, pleasant, cooperative, thin   Eyes: pupils equal, anicteric  ENMT: no nasal discharge, moist mucous membranes  Respiratory: breathing not labored at rest, speaking in full sentences   Musculoskeletal: no deformity  Neurologic: following commands, moving all extremities  Psychiatric: full affect, no hallucinations         HISTORY:     Active Problems:    HFrEF (heart failure with reduced ejection fraction) ([facility]) (4/3/2022)      Past Medical History:   Diagnosis Date    CAD (coronary artery disease) '90 '97, '13 x 2    MI, code ice in 2013 [facility]    Calculus of kidney     Colonic polyps     Congestive heart failure, unspecified     Diabetes ([facility] [address]     Gastritis     Hypercholesterolemia     Sleep apnea       Past Surgical History:   Procedure Laterality Date    COLONOSCOPY  04/06/2011    16, due 21    COLONOSCOPY N/A 3/2/2022    COLONOSCOPY    :- [facility] ENDOSCOPY    ENDOSCOPY, COLON, DIAGNOSTIC      11, due 16    HX CORONARY ARTERY BYPASS GRAFT  8/22/12    x 4 vessel by MISSY GRIMALDOENT      LASIK    HX PACEMAKER PLACEMENT  1/30/13    GA CARDIAC SURG PROCEDURE UNLIST  2012    x 4 vessels      Family History   Problem Relation Age of Onset    Heart Disease Mother         MI    Heart Disease Father         CAD & PVD    Lung Disease Father     Cancer Father         lung    Diabetes Maternal Grandmother     Heart Disease Other         CAD    Stroke Sister       History reviewed, no pertinent family history. Social History     Tobacco Use    Smoking status: Never Smoker    Smokeless tobacco: Never Used   Substance Use Topics    Alcohol use:  Yes     Alcohol/week: 0.0 standard drinks     Comment:  VERY RARE     Allergies   Allergen Reactions    Oxycodone Anaphylaxis    Pcn [Penicillins] Hives      Current Facility-Administered Medications   Medication Dose Route Frequency    albumin human 5% (BUMINATE) solution 12.5 g  12.5 g IntraVENous Q12H    dextrose 10 % infusion 0-250 mL  0-250 mL IntraVENous PRN    insulin lispro (HUMALOG) injection   SubCUTAneous AC&HS    aspirin delayed-release tablet 81 mg  81 mg Oral DAILY    hydrALAZINE (APRESOLINE) tablet 50 mg  50 mg Oral TID    milrinone (PRIMACOR) 20 MG/100 ML D5W infusion  0.375 mcg/kg/min IntraVENous CONTINUOUS    pantoprazole (PROTONIX) tablet 40 mg  40 mg Oral ACB    ranolazine ER (RANEXA) tablet 500 mg  500 mg Oral BID    rosuvastatin (CRESTOR) tablet 10 mg  10 mg Oral QHS    tamsulosin (FLOMAX) capsule 0.4 mg  0.4 mg Oral DAILY    sodium chloride (NS) flush 5-40 mL  5-40 mL IntraVENous Q8H    sodium chloride (NS) flush 5-40 mL  5-40 mL IntraVENous PRN    0.9% sodium chloride infusion  9 mL/hr IntraVENous CONTINUOUS    acetaminophen (TYLENOL) tablet 650 mg  650 mg Oral Q4H PRN    mupirocin (BACTROBAN) 2 % ointment 1 g  1 g Both Nostrils BID    chlorhexidine (PERIDEX) 0.12 % mouthwash 15 mL  15 mL Oral Q12H    [START ON 4/5/2022] rifAMPin (RIFADIN) 600 mg in 0.9% sodium chloride 100 mL IVPB  600 mg IntraVENous ONCE    [START ON 4/5/2022] fluconazole (DIFLUCAN) 200mg/100 mL IVPB (premix)  200 mg IntraVENous ONCE    [START ON 4/5/2022] levoFLOXacin (LEVAQUIN) 500 mg in D5W IVPB  500 mg IntraVENous ONCE    senna-docusate (PERICOLACE) 8.6-50 mg per tablet 1 Tablet  1 Tablet Oral DAILY    polyethylene glycol (MIRALAX) packet 17 g  17 g Oral DAILY    glucose chewable tablet 16 g  4 Tablet Oral PRN    dextrose 10 % infusion 0-250 mL  0-250 mL IntraVENous PRN    glucagon (GLUCAGEN) injection 1 mg  1 mg IntraMUSCular PRN          LAB AND IMAGING FINDINGS:     Lab Results   Component Value Date/Time    WBC 3.5 (L) 04/04/2022 03:38 AM    HGB 11.9 (L) 04/04/2022 03:38 AM    PLATELET 572 (L) 96/52/1579 03:38 AM     Lab Results   Component Value Date/Time    Sodium 134 (L) 04/04/2022 03:38 AM    Potassium 4.1 04/04/2022 03:38 AM    Chloride 101 04/04/2022 03:38 AM    CO2 29 04/04/2022 03:38 AM    BUN 21 (H) 04/04/2022 03:38 AM    Creatinine 1.10 04/04/2022 03:38 AM    Calcium 8.7 04/04/2022 03:38 AM    Magnesium 2.3 04/04/2022 03:38 AM    Phosphorus 2.6 05/26/2021 10:44 PM      Lab Results   Component Value Date/Time    Alk.  phosphatase 140 (H) 04/04/2022 03:38 AM    Protein, total 6.5 04/04/2022 03:38 AM    Albumin 3.0 (L) 04/04/2022 03:38 AM    Globulin 3.5 04/04/2022 03:38 AM     (H) 02/16/2021 11:25 AM     Lab Results   Component Value Date/Time    INR 1.1 04/04/2022 03:38 AM    Prothrombin time 11.5 (H) 04/04/2022 03:38 AM    aPTT 25.0 06/01/2021 11:00 AM      Lab Results   Component Value Date/Time    Iron 41 04/03/2022 03:27 PM    TIBC 240 (L) 04/03/2022 03:27 PM    Iron % saturation 17 (L) 04/03/2022 03:27 PM    Ferritin 172 04/03/2022 03:27 PM      No results found for: PH, PCO2, PO2  No components found for: [place]   Lab Results   Component Value Date/Time    CK 63 05/20/2021 04:28 PM                Total time: 25 min   Counseling / coordination time, spent as noted above: 20 min   > 50% counseling / coordination?: yes    Prolonged service was provided for  []30 min   []75 min in face to face time in the presence of the patient, spent as noted above. Time Start:   Time End:   Note: this can only be billed with 90080 (initial) or 77946 (follow up). If multiple start / stop times, list each separately.

## 2022-04-04 NOTE — TELEPHONE ENCOUNTER
Voice message received from [facility] # [phone]. She is looking for signed physician order from January on patient. Please call back to discuss further.

## 2022-04-05 ENCOUNTER — TELEPHONE (OUTPATIENT)
Dept: CARDIOLOGY CLINIC | Age: 77
End: 2022-04-05

## 2022-04-05 ENCOUNTER — HOSPITAL ENCOUNTER (OUTPATIENT)
Dept: NON INVASIVE DIAGNOSTICS | Age: 77
Discharge: HOME | DRG: 001 | End: 2022-04-05
Attending: THORACIC SURGERY (CARDIOTHORACIC VASCULAR SURGERY)
Payer: MEDICARE

## 2022-04-05 ENCOUNTER — APPOINTMENT (OUTPATIENT)
Dept: GENERAL RADIOLOGY | Age: 77
DRG: 001 | End: 2022-04-05
Attending: NURSE PRACTITIONER
Payer: MEDICARE

## 2022-04-05 ENCOUNTER — ANESTHESIA (OUTPATIENT)
Dept: CARDIOTHORACIC SURGERY | Age: 77
DRG: 001 | End: 2022-04-05
Payer: MEDICARE

## 2022-04-05 PROBLEM — Z95.811 S/P VENTRICULAR ASSIST DEVICE (HCC): Status: ACTIVE | Noted: 2022-04-05

## 2022-04-05 LAB
ABO + RH BLD: NORMAL
ADMINISTERED INITIALS, ADMINIT: NORMAL
ALBUMIN SERPL-MCNC: 3.1 G/DL (ref 3.5–5)
ALBUMIN SERPL-MCNC: 3.2 G/DL (ref 3.5–5)
ALBUMIN SERPL-MCNC: 3.4 G/DL (ref 3.5–5)
ALBUMIN/GLOB SERPL: 1.1 {RATIO} (ref 1.1–2.2)
ALBUMIN/GLOB SERPL: 1.3 {RATIO} (ref 1.1–2.2)
ALBUMIN/GLOB SERPL: 1.3 {RATIO} (ref 1.1–2.2)
ALP SERPL-CCNC: 141 U/L (ref 45–117)
ALP SERPL-CCNC: 86 U/L (ref 45–117)
ALP SERPL-CCNC: 92 U/L (ref 45–117)
ALT SERPL-CCNC: 14 U/L (ref 12–78)
ALT SERPL-CCNC: 16 U/L (ref 12–78)
ALT SERPL-CCNC: 17 U/L (ref 12–78)
ANION GAP SERPL CALC-SCNC: 4 MMOL/L (ref 5–15)
ANION GAP SERPL CALC-SCNC: 6 MMOL/L (ref 5–15)
ANION GAP SERPL CALC-SCNC: 7 MMOL/L (ref 5–15)
APTT PPP: 30.2 SEC (ref 22.1–31)
APTT PPP: 31.7 SEC (ref 22.1–31)
APTT PPP: 32.2 SEC (ref 22.1–31)
ARTERIAL PATENCY WRIST A: ABNORMAL
AST SERPL-CCNC: 46 U/L (ref 15–37)
AST SERPL-CCNC: 46 U/L (ref 15–37)
AST SERPL-CCNC: 82 U/L (ref 15–37)
BASE DEFICIT BLD-SCNC: 0.4 MMOL/L
BASOPHILS # BLD: 0 K/UL (ref 0–0.1)
BASOPHILS # BLD: 0 K/UL (ref 0–0.1)
BASOPHILS NFR BLD: 0 % (ref 0–1)
BASOPHILS NFR BLD: 0 % (ref 0–1)
BDY SITE: ABNORMAL
BDY SITE: ABNORMAL
BILIRUB SERPL-MCNC: 0.7 MG/DL (ref 0.2–1)
BILIRUB SERPL-MCNC: 2 MG/DL (ref 0.2–1)
BILIRUB SERPL-MCNC: 2.2 MG/DL (ref 0.2–1)
BLD PROD TYP BPU: NORMAL
BLOOD GROUP ANTIBODIES SERPL: NORMAL
BNP SERPL-MCNC: 3891 PG/ML
BNP SERPL-MCNC: 4265 PG/ML
BPU ID: NORMAL
BUN SERPL-MCNC: 14 MG/DL (ref 6–20)
BUN SERPL-MCNC: 15 MG/DL (ref 6–20)
BUN SERPL-MCNC: 17 MG/DL (ref 6–20)
BUN/CREAT SERPL: 14 (ref 12–20)
BUN/CREAT SERPL: 16 (ref 12–20)
BUN/CREAT SERPL: 19 (ref 12–20)
CA-I BLD-SCNC: 1.05 MMOL/L (ref 1.12–1.32)
CALCIUM SERPL-MCNC: 7.9 MG/DL (ref 8.5–10.1)
CALCIUM SERPL-MCNC: 8.3 MG/DL (ref 8.5–10.1)
CALCIUM SERPL-MCNC: 9 MG/DL (ref 8.5–10.1)
CHLORIDE SERPL-SCNC: 103 MMOL/L (ref 97–108)
CHLORIDE SERPL-SCNC: 109 MMOL/L (ref 97–108)
CHLORIDE SERPL-SCNC: 109 MMOL/L (ref 97–108)
CO2 SERPL-SCNC: 24 MMOL/L (ref 21–32)
CO2 SERPL-SCNC: 24 MMOL/L (ref 21–32)
CO2 SERPL-SCNC: 25 MMOL/L (ref 21–32)
COHGB MFR BLD: 1.1 % (ref 1–2)
CREAT SERPL-MCNC: 0.91 MG/DL (ref 0.7–1.3)
CREAT SERPL-MCNC: 0.96 MG/DL (ref 0.7–1.3)
CREAT SERPL-MCNC: 0.97 MG/DL (ref 0.7–1.3)
CROSSMATCH RESULT,%XM: NORMAL
D50 ADMINISTERED, D50ADM: 0 ML
D50 ORDER, D50ORD: 0 ML
DIFFERENTIAL METHOD BLD: ABNORMAL
DIFFERENTIAL METHOD BLD: ABNORMAL
EOSINOPHIL # BLD: 0 K/UL (ref 0–0.4)
EOSINOPHIL # BLD: 0 K/UL (ref 0–0.4)
EOSINOPHIL NFR BLD: 0 % (ref 0–7)
EOSINOPHIL NFR BLD: 0 % (ref 0–7)
ERYTHROCYTE [DISTWIDTH] IN BLOOD BY AUTOMATED COUNT: 24.3 % (ref 11.5–14.5)
ERYTHROCYTE [DISTWIDTH] IN BLOOD BY AUTOMATED COUNT: 24.6 % (ref 11.5–14.5)
ERYTHROCYTE [DISTWIDTH] IN BLOOD BY AUTOMATED COUNT: 24.9 % (ref 11.5–14.5)
FIBRINOGEN PPP-MCNC: 206 MG/DL (ref 200–475)
FIBRINOGEN PPP-MCNC: 222 MG/DL (ref 200–475)
FIBRINOGEN PPP-MCNC: 333 MG/DL (ref 200–475)
GAS FLOW.O2 O2 DELIVERY SYS: ABNORMAL L/MIN
GAS FLOW.O2 SETTING OXYMISER: 16 BPM
GLOBULIN SER CALC-MCNC: 2.3 G/DL (ref 2–4)
GLOBULIN SER CALC-MCNC: 2.6 G/DL (ref 2–4)
GLOBULIN SER CALC-MCNC: 2.9 G/DL (ref 2–4)
GLSCOM COMMENTS: NORMAL
GLUCOSE BLD STRIP.AUTO-MCNC: 103 MG/DL (ref 65–117)
GLUCOSE BLD STRIP.AUTO-MCNC: 109 MG/DL (ref 65–117)
GLUCOSE BLD STRIP.AUTO-MCNC: 117 MG/DL (ref 65–117)
GLUCOSE BLD STRIP.AUTO-MCNC: 128 MG/DL (ref 65–117)
GLUCOSE BLD STRIP.AUTO-MCNC: 94 MG/DL (ref 65–117)
GLUCOSE BLD STRIP.AUTO-MCNC: 99 MG/DL (ref 65–117)
GLUCOSE SERPL-MCNC: 115 MG/DL (ref 65–100)
GLUCOSE SERPL-MCNC: 118 MG/DL (ref 65–100)
GLUCOSE SERPL-MCNC: 99 MG/DL (ref 65–100)
GLUCOSE, GLC: 103 MG/DL
GLUCOSE, GLC: 107 MG/DL
GLUCOSE, GLC: 109 MG/DL
GLUCOSE, GLC: 117 MG/DL
GLUCOSE, GLC: 128 MG/DL
GLUCOSE, GLC: 135 MG/DL
GLUCOSE, GLC: 158 MG/DL
GLUCOSE, GLC: 160 MG/DL
GLUCOSE, GLC: 165 MG/DL
GLUCOSE, GLC: 94 MG/DL
GLUCOSE, GLC: 99 MG/DL
HCO3 BLD-SCNC: 23.9 MMOL/L (ref 22–26)
HCT VFR BLD AUTO: 23.2 % (ref 36.6–50.3)
HCT VFR BLD AUTO: 25.3 % (ref 36.6–50.3)
HCT VFR BLD AUTO: 37.4 % (ref 36.6–50.3)
HGB BLD OXIMETRY-MCNC: 8.1 G/DL (ref 14–17)
HGB BLD-MCNC: 11.9 G/DL (ref 12.1–17)
HGB BLD-MCNC: 7.5 G/DL (ref 12.1–17)
HGB BLD-MCNC: 8.1 G/DL (ref 12.1–17)
HIGH TARGET, HITG: 130 MG/DL
HIGH TARGET, HITG: 140 MG/DL
IMM GRANULOCYTES # BLD AUTO: 0 K/UL
IMM GRANULOCYTES # BLD AUTO: 0 K/UL
IMM GRANULOCYTES NFR BLD AUTO: 0 %
IMM GRANULOCYTES NFR BLD AUTO: 0 %
INR PPP: 1.2 (ref 0.9–1.1)
INR PPP: 1.3 (ref 0.9–1.1)
INSULIN ADMINSTERED, INSADM: 1.4 UNITS/HOUR
INSULIN ADMINSTERED, INSADM: 2 UNITS/HOUR
INSULIN ADMINSTERED, INSADM: 2.2 UNITS/HOUR
INSULIN ADMINSTERED, INSADM: 2.4 UNITS/HOUR
INSULIN ADMINSTERED, INSADM: 2.5 UNITS/HOUR
INSULIN ADMINSTERED, INSADM: 2.9 UNITS/HOUR
INSULIN ADMINSTERED, INSADM: 2.9 UNITS/HOUR
INSULIN ADMINSTERED, INSADM: 3.4 UNITS/HOUR
INSULIN ADMINSTERED, INSADM: 3.8 UNITS/HOUR
INSULIN ADMINSTERED, INSADM: 4 UNITS/HOUR
INSULIN ADMINSTERED, INSADM: 5.3 UNITS/HOUR
INSULIN ORDER, INSORD: 1.4 UNITS/HOUR
INSULIN ORDER, INSORD: 2 UNITS/HOUR
INSULIN ORDER, INSORD: 2.2 UNITS/HOUR
INSULIN ORDER, INSORD: 2.4 UNITS/HOUR
INSULIN ORDER, INSORD: 2.5 UNITS/HOUR
INSULIN ORDER, INSORD: 2.9 UNITS/HOUR
INSULIN ORDER, INSORD: 2.9 UNITS/HOUR
INSULIN ORDER, INSORD: 3.4 UNITS/HOUR
INSULIN ORDER, INSORD: 3.8 UNITS/HOUR
INSULIN ORDER, INSORD: 4 UNITS/HOUR
INSULIN ORDER, INSORD: 5.3 UNITS/HOUR
LACTATE SERPL-SCNC: 2.4 MMOL/L (ref 0.4–2)
LDH SERPL L TO P-CCNC: 232 U/L (ref 85–241)
LOW TARGET, LOT: 100 MG/DL
LOW TARGET, LOT: 95 MG/DL
LYMPHOCYTES # BLD: 0.1 K/UL (ref 0.8–3.5)
LYMPHOCYTES # BLD: 0.3 K/UL (ref 0.8–3.5)
LYMPHOCYTES NFR BLD: 2 % (ref 12–49)
LYMPHOCYTES NFR BLD: 5 % (ref 12–49)
MAGNESIUM SERPL-MCNC: 2.1 MG/DL (ref 1.6–2.4)
MAGNESIUM SERPL-MCNC: 2.1 MG/DL (ref 1.6–2.4)
MAGNESIUM SERPL-MCNC: 2.4 MG/DL (ref 1.6–2.4)
MCH RBC QN AUTO: 24.7 PG (ref 26–34)
MCH RBC QN AUTO: 24.8 PG (ref 26–34)
MCH RBC QN AUTO: 25.4 PG (ref 26–34)
MCHC RBC AUTO-ENTMCNC: 31.8 G/DL (ref 30–36.5)
MCHC RBC AUTO-ENTMCNC: 32 G/DL (ref 30–36.5)
MCHC RBC AUTO-ENTMCNC: 32.3 G/DL (ref 30–36.5)
MCV RBC AUTO: 77.6 FL (ref 80–99)
MCV RBC AUTO: 77.6 FL (ref 80–99)
MCV RBC AUTO: 78.6 FL (ref 80–99)
METHGB MFR BLD: 0.7 % (ref 0–1.4)
MINUTES UNTIL NEXT BG, NBG: 120 MIN
MINUTES UNTIL NEXT BG, NBG: 120 MIN
MINUTES UNTIL NEXT BG, NBG: 60 MIN
MONOCYTES # BLD: 0.1 K/UL (ref 0–1)
MONOCYTES # BLD: 0.1 K/UL (ref 0–1)
MONOCYTES NFR BLD: 2 % (ref 5–13)
MONOCYTES NFR BLD: 2 % (ref 5–13)
MULTIPLIER, MUL: 0.03
MULTIPLIER, MUL: 0.04
MULTIPLIER, MUL: 0.04
MULTIPLIER, MUL: 0.05
NEUTS BAND NFR BLD MANUAL: 3 % (ref 0–6)
NEUTS BAND NFR BLD MANUAL: 3 % (ref 0–6)
NEUTS SEG # BLD: 6.1 K/UL (ref 1.8–8)
NEUTS SEG # BLD: 6.3 K/UL (ref 1.8–8)
NEUTS SEG NFR BLD: 90 % (ref 32–75)
NEUTS SEG NFR BLD: 93 % (ref 32–75)
NRBC # BLD: 0 K/UL (ref 0–0.01)
NRBC BLD-RTO: 0 PER 100 WBC
O2/TOTAL GAS SETTING VFR VENT: 100 %
ORDER INITIALS, ORDINIT: NORMAL
OXYHGB MFR BLD: 72.2 % (ref 94–97)
PCO2 BLD: 36.3 MMHG (ref 35–45)
PEEP RESPIRATORY: 10 CMH2O
PH BLD: 7.43 [PH] (ref 7.35–7.45)
PLATELET # BLD AUTO: 109 K/UL (ref 150–400)
PLATELET # BLD AUTO: 113 K/UL (ref 150–400)
PLATELET # BLD AUTO: 119 K/UL (ref 150–400)
PLATELET # BLD AUTO: 129 K/UL (ref 150–400)
PLATELET # BLD AUTO: 134 K/UL (ref 150–400)
PMV BLD AUTO: 9.6 FL (ref 8.9–12.9)
PO2 BLD: 641 MMHG (ref 80–100)
POTASSIUM SERPL-SCNC: 3.4 MMOL/L (ref 3.5–5.1)
POTASSIUM SERPL-SCNC: 4.1 MMOL/L (ref 3.5–5.1)
POTASSIUM SERPL-SCNC: 4.6 MMOL/L (ref 3.5–5.1)
PROT SERPL-MCNC: 5.4 G/DL (ref 6.4–8.2)
PROT SERPL-MCNC: 6 G/DL (ref 6.4–8.2)
PROT SERPL-MCNC: 6.1 G/DL (ref 6.4–8.2)
PROTHROMBIN TIME: 12 SEC (ref 9–11.1)
PROTHROMBIN TIME: 12 SEC (ref 9–11.1)
PROTHROMBIN TIME: 12.6 SEC (ref 9–11.1)
PROTHROMBIN TIME: 13.1 SEC (ref 9–11.1)
RBC # BLD AUTO: 2.95 M/UL (ref 4.1–5.7)
RBC # BLD AUTO: 3.26 M/UL (ref 4.1–5.7)
RBC # BLD AUTO: 4.82 M/UL (ref 4.1–5.7)
RBC MORPH BLD: ABNORMAL
SAO2 % BLD: 100 % (ref 92–97)
SAO2 % BLD: 74 % (ref 95–99)
SERVICE CMNT-IMP: ABNORMAL
SERVICE CMNT-IMP: NORMAL
SODIUM SERPL-SCNC: 133 MMOL/L (ref 136–145)
SODIUM SERPL-SCNC: 138 MMOL/L (ref 136–145)
SODIUM SERPL-SCNC: 140 MMOL/L (ref 136–145)
SPECIMEN EXP DATE BLD: NORMAL
SPECIMEN SITE: ABNORMAL
SPECIMEN TYPE: ABNORMAL
STATUS OF UNIT,%ST: NORMAL
THERAPEUTIC RANGE,PTTT: ABNORMAL SECS (ref 58–77)
THERAPEUTIC RANGE,PTTT: ABNORMAL SECS (ref 58–77)
THERAPEUTIC RANGE,PTTT: NORMAL SECS (ref 58–77)
UNIT DIVISION, %UDIV: 0
VENTILATION MODE VENT: ABNORMAL
VT SETTING VENT: 520 ML
WBC # BLD AUTO: 3.5 K/UL (ref 4.1–11.1)
WBC # BLD AUTO: 6.5 K/UL (ref 4.1–11.1)
WBC # BLD AUTO: 6.5 K/UL (ref 4.1–11.1)

## 2022-04-05 PROCEDURE — P9059 PLASMA, FRZ BETWEEN 8-24HOUR: HCPCS

## 2022-04-05 PROCEDURE — 85730 THROMBOPLASTIN TIME PARTIAL: CPT

## 2022-04-05 PROCEDURE — 33530 CORONARY ARTERY BYPASS/REOP: CPT | Performed by: PHYSICIAN ASSISTANT

## 2022-04-05 PROCEDURE — 77030010517 HC APPL SEAL FLOSEL BAXT -B: Performed by: THORACIC SURGERY (CARDIOTHORACIC VASCULAR SURGERY)

## 2022-04-05 PROCEDURE — 2709999900 HC NON-CHARGEABLE SUPPLY: Performed by: THORACIC SURGERY (CARDIOTHORACIC VASCULAR SURGERY)

## 2022-04-05 PROCEDURE — 74011000250 HC RX REV CODE- 250: Performed by: NURSE ANESTHETIST, CERTIFIED REGISTERED

## 2022-04-05 PROCEDURE — 36415 COLL VENOUS BLD VENIPUNCTURE: CPT

## 2022-04-05 PROCEDURE — 85027 COMPLETE CBC AUTOMATED: CPT

## 2022-04-05 PROCEDURE — 77030037878 HC DRSG MEPILEX >48IN BORD MOLN -B: Performed by: THORACIC SURGERY (CARDIOTHORACIC VASCULAR SURGERY)

## 2022-04-05 PROCEDURE — 77030010506 HC ADH BIOGLU CRYO -G: Performed by: THORACIC SURGERY (CARDIOTHORACIC VASCULAR SURGERY)

## 2022-04-05 PROCEDURE — 77030040504 HC DRN WND MDII -B: Performed by: THORACIC SURGERY (CARDIOTHORACIC VASCULAR SURGERY)

## 2022-04-05 PROCEDURE — 77030014491 HC PLEDG PTFE BARD -A: Performed by: THORACIC SURGERY (CARDIOTHORACIC VASCULAR SURGERY)

## 2022-04-05 PROCEDURE — 76060000045 HC ANESTHESIA 7 TO 7.5 HR: Performed by: THORACIC SURGERY (CARDIOTHORACIC VASCULAR SURGERY)

## 2022-04-05 PROCEDURE — 77030015757: Performed by: THORACIC SURGERY (CARDIOTHORACIC VASCULAR SURGERY)

## 2022-04-05 PROCEDURE — 36600 WITHDRAWAL OF ARTERIAL BLOOD: CPT

## 2022-04-05 PROCEDURE — 74011000258 HC RX REV CODE- 258: Performed by: THORACIC SURGERY (CARDIOTHORACIC VASCULAR SURGERY)

## 2022-04-05 PROCEDURE — 74011000250 HC RX REV CODE- 250: Performed by: NURSE PRACTITIONER

## 2022-04-05 PROCEDURE — B24BZZ4 ULTRASONOGRAPHY OF HEART WITH AORTA, TRANSESOPHAGEAL: ICD-10-PCS | Performed by: THORACIC SURGERY (CARDIOTHORACIC VASCULAR SURGERY)

## 2022-04-05 PROCEDURE — C1713 ANCHOR/SCREW BN/BN,TIS/BN: HCPCS | Performed by: THORACIC SURGERY (CARDIOTHORACIC VASCULAR SURGERY)

## 2022-04-05 PROCEDURE — 74011250636 HC RX REV CODE- 250/636: Performed by: THORACIC SURGERY (CARDIOTHORACIC VASCULAR SURGERY)

## 2022-04-05 PROCEDURE — C1768 GRAFT, VASCULAR: HCPCS | Performed by: THORACIC SURGERY (CARDIOTHORACIC VASCULAR SURGERY)

## 2022-04-05 PROCEDURE — 83735 ASSAY OF MAGNESIUM: CPT

## 2022-04-05 PROCEDURE — 77030014650 HC SEAL MTRX FLOSEL BAXT -C: Performed by: THORACIC SURGERY (CARDIOTHORACIC VASCULAR SURGERY)

## 2022-04-05 PROCEDURE — 77030008684 HC TU ET CUF COVD -B: Performed by: STUDENT IN AN ORGANIZED HEALTH CARE EDUCATION/TRAINING PROGRAM

## 2022-04-05 PROCEDURE — 85384 FIBRINOGEN ACTIVITY: CPT

## 2022-04-05 PROCEDURE — 77030040830 HC CATH URETH FOL MDII -A: Performed by: THORACIC SURGERY (CARDIOTHORACIC VASCULAR SURGERY)

## 2022-04-05 PROCEDURE — 88313 SPECIAL STAINS GROUP 2: CPT

## 2022-04-05 PROCEDURE — 85025 COMPLETE CBC W/AUTO DIFF WBC: CPT

## 2022-04-05 PROCEDURE — 77030032400 HC CATH VENT LT HRT LIVA -B: Performed by: THORACIC SURGERY (CARDIOTHORACIC VASCULAR SURGERY)

## 2022-04-05 PROCEDURE — 80053 COMPREHEN METABOLIC PANEL: CPT

## 2022-04-05 PROCEDURE — P9012 CRYOPRECIPITATE EACH UNIT: HCPCS

## 2022-04-05 PROCEDURE — 73610000005 HC INO THERAPY INITIAL

## 2022-04-05 PROCEDURE — 30233M1 TRANSFUSION OF NONAUTOLOGOUS PLASMA CRYOPRECIPITATE INTO PERIPHERAL VEIN, PERCUTANEOUS APPROACH: ICD-10-PCS | Performed by: THORACIC SURGERY (CARDIOTHORACIC VASCULAR SURGERY)

## 2022-04-05 PROCEDURE — 02QF0ZZ REPAIR AORTIC VALVE, OPEN APPROACH: ICD-10-PCS | Performed by: THORACIC SURGERY (CARDIOTHORACIC VASCULAR SURGERY)

## 2022-04-05 PROCEDURE — 94002 VENT MGMT INPAT INIT DAY: CPT

## 2022-04-05 PROCEDURE — 5A1221Z PERFORMANCE OF CARDIAC OUTPUT, CONTINUOUS: ICD-10-PCS | Performed by: THORACIC SURGERY (CARDIOTHORACIC VASCULAR SURGERY)

## 2022-04-05 PROCEDURE — 74011000258 HC RX REV CODE- 258: Performed by: NURSE ANESTHETIST, CERTIFIED REGISTERED

## 2022-04-05 PROCEDURE — 85610 PROTHROMBIN TIME: CPT

## 2022-04-05 PROCEDURE — 76010000120 HC CV SURG 7 TO 7.5 HR: Performed by: THORACIC SURGERY (CARDIOTHORACIC VASCULAR SURGERY)

## 2022-04-05 PROCEDURE — 77030002978 HC SUT POLY TELE -A: Performed by: THORACIC SURGERY (CARDIOTHORACIC VASCULAR SURGERY)

## 2022-04-05 PROCEDURE — 73610000026 HC INO THERAPY EACH HOUR

## 2022-04-05 PROCEDURE — 93355 ECHO TRANSESOPHAGEAL (TEE): CPT | Performed by: THORACIC SURGERY (CARDIOTHORACIC VASCULAR SURGERY)

## 2022-04-05 PROCEDURE — 33979 INSERT INTRACORPOREAL DEVICE: CPT | Performed by: PHYSICIAN ASSISTANT

## 2022-04-05 PROCEDURE — 99291 CRITICAL CARE FIRST HOUR: CPT | Performed by: THORACIC SURGERY (CARDIOTHORACIC VASCULAR SURGERY)

## 2022-04-05 PROCEDURE — 88305 TISSUE EXAM BY PATHOLOGIST: CPT

## 2022-04-05 PROCEDURE — P9073 PLATELETS PHERESIS PATH REDU: HCPCS

## 2022-04-05 PROCEDURE — 82962 GLUCOSE BLOOD TEST: CPT

## 2022-04-05 PROCEDURE — 74011636637 HC RX REV CODE- 636/637: Performed by: NURSE ANESTHETIST, CERTIFIED REGISTERED

## 2022-04-05 PROCEDURE — 77030002986 HC SUT PROL J&J -A: Performed by: THORACIC SURGERY (CARDIOTHORACIC VASCULAR SURGERY)

## 2022-04-05 PROCEDURE — 74011000250 HC RX REV CODE- 250: Performed by: THORACIC SURGERY (CARDIOTHORACIC VASCULAR SURGERY)

## 2022-04-05 PROCEDURE — 74011250636 HC RX REV CODE- 250/636: Performed by: NURSE ANESTHETIST, CERTIFIED REGISTERED

## 2022-04-05 PROCEDURE — 83880 ASSAY OF NATRIURETIC PEPTIDE: CPT

## 2022-04-05 PROCEDURE — 99292 CRITICAL CARE ADDL 30 MIN: CPT | Performed by: THORACIC SURGERY (CARDIOTHORACIC VASCULAR SURGERY)

## 2022-04-05 PROCEDURE — 74011000272 HC RX REV CODE- 272: Performed by: THORACIC SURGERY (CARDIOTHORACIC VASCULAR SURGERY)

## 2022-04-05 PROCEDURE — 77030012390 HC DRN CHST BTL GTNG -B: Performed by: THORACIC SURGERY (CARDIOTHORACIC VASCULAR SURGERY)

## 2022-04-05 PROCEDURE — 94760 N-INVAS EAR/PLS OXIMETRY 1: CPT

## 2022-04-05 PROCEDURE — 77030021177: Performed by: THORACIC SURGERY (CARDIOTHORACIC VASCULAR SURGERY)

## 2022-04-05 PROCEDURE — P9045 ALBUMIN (HUMAN), 5%, 250 ML: HCPCS | Performed by: NURSE ANESTHETIST, CERTIFIED REGISTERED

## 2022-04-05 PROCEDURE — 77030013798 HC KT TRNSDUC PRSSR EDWD -B: Performed by: THORACIC SURGERY (CARDIOTHORACIC VASCULAR SURGERY)

## 2022-04-05 PROCEDURE — 77030038606 HC IMPL VAD ASST HRTMT III W/CNTRL STJU -L: Performed by: THORACIC SURGERY (CARDIOTHORACIC VASCULAR SURGERY)

## 2022-04-05 PROCEDURE — 77030003010 HC SUT SURG STL J&J -B: Performed by: THORACIC SURGERY (CARDIOTHORACIC VASCULAR SURGERY)

## 2022-04-05 PROCEDURE — 85049 AUTOMATED PLATELET COUNT: CPT

## 2022-04-05 PROCEDURE — 5A02216 ASSISTANCE WITH CARDIAC OUTPUT USING OTHER PUMP, CONTINUOUS: ICD-10-PCS | Performed by: THORACIC SURGERY (CARDIOTHORACIC VASCULAR SURGERY)

## 2022-04-05 PROCEDURE — 33979 INSERT INTRACORPOREAL DEVICE: CPT | Performed by: THORACIC SURGERY (CARDIOTHORACIC VASCULAR SURGERY)

## 2022-04-05 PROCEDURE — 83605 ASSAY OF LACTIC ACID: CPT

## 2022-04-05 PROCEDURE — 33390 VALVULOPLASTY AORTIC VALVE: CPT | Performed by: PHYSICIAN ASSISTANT

## 2022-04-05 PROCEDURE — 77030041244 HC CBL PACE EXT TEMP REMG -B: Performed by: THORACIC SURGERY (CARDIOTHORACIC VASCULAR SURGERY)

## 2022-04-05 PROCEDURE — 74011000258 HC RX REV CODE- 258: Performed by: NURSE PRACTITIONER

## 2022-04-05 PROCEDURE — 77030013861 HC PNCH AORT CLNCUT QUES -B: Performed by: THORACIC SURGERY (CARDIOTHORACIC VASCULAR SURGERY)

## 2022-04-05 PROCEDURE — 82803 BLOOD GASES ANY COMBINATION: CPT

## 2022-04-05 PROCEDURE — 77030018729 HC ELECTRD DEFIB PAD CARD -B: Performed by: THORACIC SURGERY (CARDIOTHORACIC VASCULAR SURGERY)

## 2022-04-05 PROCEDURE — 77030019702 HC WRP THER MENM -C: Performed by: THORACIC SURGERY (CARDIOTHORACIC VASCULAR SURGERY)

## 2022-04-05 PROCEDURE — 33390 VALVULOPLASTY AORTIC VALVE: CPT | Performed by: THORACIC SURGERY (CARDIOTHORACIC VASCULAR SURGERY)

## 2022-04-05 PROCEDURE — 74011250637 HC RX REV CODE- 250/637: Performed by: NURSE PRACTITIONER

## 2022-04-05 PROCEDURE — 77030002933 HC SUT MCRYL J&J -A: Performed by: THORACIC SURGERY (CARDIOTHORACIC VASCULAR SURGERY)

## 2022-04-05 PROCEDURE — 83615 LACTATE (LD) (LDH) ENZYME: CPT

## 2022-04-05 PROCEDURE — 77030011264 HC ELECTRD BLD EXT COVD -A: Performed by: THORACIC SURGERY (CARDIOTHORACIC VASCULAR SURGERY)

## 2022-04-05 PROCEDURE — 74011000636 HC RX REV CODE- 636: Performed by: THORACIC SURGERY (CARDIOTHORACIC VASCULAR SURGERY)

## 2022-04-05 PROCEDURE — 33530 CORONARY ARTERY BYPASS/REOP: CPT | Performed by: THORACIC SURGERY (CARDIOTHORACIC VASCULAR SURGERY)

## 2022-04-05 PROCEDURE — 77030014004 HC SPNG HELISTAT INLC -C: Performed by: THORACIC SURGERY (CARDIOTHORACIC VASCULAR SURGERY)

## 2022-04-05 PROCEDURE — 71045 X-RAY EXAM CHEST 1 VIEW: CPT

## 2022-04-05 PROCEDURE — 77030010819: Performed by: THORACIC SURGERY (CARDIOTHORACIC VASCULAR SURGERY)

## 2022-04-05 PROCEDURE — 02HA0QZ INSERTION OF IMPLANTABLE HEART ASSIST SYSTEM INTO HEART, OPEN APPROACH: ICD-10-PCS | Performed by: THORACIC SURGERY (CARDIOTHORACIC VASCULAR SURGERY)

## 2022-04-05 PROCEDURE — 77030003029 HC SUT VCRL J&J -B: Performed by: THORACIC SURGERY (CARDIOTHORACIC VASCULAR SURGERY)

## 2022-04-05 PROCEDURE — 74011250636 HC RX REV CODE- 250/636: Performed by: NURSE PRACTITIONER

## 2022-04-05 PROCEDURE — 77030011235 HC DRN PERCRD SUMP MEDT -B: Performed by: THORACIC SURGERY (CARDIOTHORACIC VASCULAR SURGERY)

## 2022-04-05 PROCEDURE — 77030003020 HC SUT TICRN COVD -A: Performed by: THORACIC SURGERY (CARDIOTHORACIC VASCULAR SURGERY)

## 2022-04-05 PROCEDURE — 77030026438 HC STYL ET INTUB CARD -A: Performed by: STUDENT IN AN ORGANIZED HEALTH CARE EDUCATION/TRAINING PROGRAM

## 2022-04-05 PROCEDURE — 82375 ASSAY CARBOXYHB QUANT: CPT

## 2022-04-05 PROCEDURE — 65610000003 HC RM ICU SURGICAL

## 2022-04-05 PROCEDURE — 77030020140: Performed by: THORACIC SURGERY (CARDIOTHORACIC VASCULAR SURGERY)

## 2022-04-05 PROCEDURE — 30233K1 TRANSFUSION OF NONAUTOLOGOUS FROZEN PLASMA INTO PERIPHERAL VEIN, PERCUTANEOUS APPROACH: ICD-10-PCS | Performed by: THORACIC SURGERY (CARDIOTHORACIC VASCULAR SURGERY)

## 2022-04-05 DEVICE — DEVICE IMPL VENTRCLR ASST KIT -- HEARTMATE III: Type: IMPLANTABLE DEVICE | Site: HEART | Status: FUNCTIONAL

## 2022-04-05 DEVICE — GRAFT VASC L20CM ID20MM STRTCH WALL GOR TX: Type: IMPLANTABLE DEVICE | Site: HEART | Status: FUNCTIONAL

## 2022-04-05 RX ORDER — SUCRALFATE 1 G/1
1 TABLET ORAL
Status: DISCONTINUED | OUTPATIENT
Start: 2022-04-05 | End: 2022-05-06 | Stop reason: HOSPADM

## 2022-04-05 RX ORDER — ROCURONIUM BROMIDE 10 MG/ML
INJECTION, SOLUTION INTRAVENOUS AS NEEDED
Status: DISCONTINUED | OUTPATIENT
Start: 2022-04-05 | End: 2022-04-05 | Stop reason: HOSPADM

## 2022-04-05 RX ORDER — PROTAMINE SULFATE 10 MG/ML
INJECTION, SOLUTION INTRAVENOUS AS NEEDED
Status: DISCONTINUED | OUTPATIENT
Start: 2022-04-05 | End: 2022-04-05 | Stop reason: HOSPADM

## 2022-04-05 RX ORDER — AMOXICILLIN 250 MG
1 CAPSULE ORAL DAILY
Status: DISCONTINUED | OUTPATIENT
Start: 2022-04-06 | End: 2022-04-10

## 2022-04-05 RX ORDER — HEPARIN SODIUM 10000 [USP'U]/100ML
400 INJECTION, SOLUTION INTRAVENOUS CONTINUOUS
Status: DISCONTINUED | OUTPATIENT
Start: 2022-04-06 | End: 2022-04-05 | Stop reason: SDUPTHER

## 2022-04-05 RX ORDER — HEPARIN SODIUM 10000 [USP'U]/100ML
12-25 INJECTION, SOLUTION INTRAVENOUS
Status: DISCONTINUED | OUTPATIENT
Start: 2022-04-06 | End: 2022-04-05 | Stop reason: SDUPTHER

## 2022-04-05 RX ORDER — SODIUM CHLORIDE 9 MG/ML
9 INJECTION, SOLUTION INTRAVENOUS CONTINUOUS
Status: DISCONTINUED | OUTPATIENT
Start: 2022-04-05 | End: 2022-05-06 | Stop reason: HOSPADM

## 2022-04-05 RX ORDER — INSULIN LISPRO 100 [IU]/ML
INJECTION, SOLUTION INTRAVENOUS; SUBCUTANEOUS
Status: DISCONTINUED | OUTPATIENT
Start: 2022-04-05 | End: 2022-05-06 | Stop reason: HOSPADM

## 2022-04-05 RX ORDER — SODIUM CHLORIDE 450 MG/100ML
10 INJECTION, SOLUTION INTRAVENOUS CONTINUOUS
Status: DISCONTINUED | OUTPATIENT
Start: 2022-04-05 | End: 2022-05-06 | Stop reason: HOSPADM

## 2022-04-05 RX ORDER — VANCOMYCIN HYDROCHLORIDE
1250
Status: COMPLETED | OUTPATIENT
Start: 2022-04-05 | End: 2022-04-07

## 2022-04-05 RX ORDER — SODIUM CHLORIDE 9 MG/ML
250 INJECTION, SOLUTION INTRAVENOUS AS NEEDED
Status: DISCONTINUED | OUTPATIENT
Start: 2022-04-05 | End: 2022-04-05 | Stop reason: HOSPADM

## 2022-04-05 RX ORDER — ACETAMINOPHEN 325 MG/1
650 TABLET ORAL
Status: DISCONTINUED | OUTPATIENT
Start: 2022-04-05 | End: 2022-05-06 | Stop reason: HOSPADM

## 2022-04-05 RX ORDER — SODIUM CHLORIDE 0.9 % (FLUSH) 0.9 %
5-40 SYRINGE (ML) INJECTION AS NEEDED
Status: DISCONTINUED | OUTPATIENT
Start: 2022-04-05 | End: 2022-05-06 | Stop reason: HOSPADM

## 2022-04-05 RX ORDER — SODIUM CHLORIDE, SODIUM LACTATE, POTASSIUM CHLORIDE, CALCIUM CHLORIDE 600; 310; 30; 20 MG/100ML; MG/100ML; MG/100ML; MG/100ML
INJECTION, SOLUTION INTRAVENOUS
Status: DISCONTINUED | OUTPATIENT
Start: 2022-04-05 | End: 2022-04-05 | Stop reason: HOSPADM

## 2022-04-05 RX ORDER — ALBUMIN HUMAN 50 G/1000ML
SOLUTION INTRAVENOUS AS NEEDED
Status: DISCONTINUED | OUTPATIENT
Start: 2022-04-05 | End: 2022-04-05 | Stop reason: HOSPADM

## 2022-04-05 RX ORDER — SUFENTANIL CITRATE 50 UG/ML
INJECTION EPIDURAL; INTRAVENOUS AS NEEDED
Status: DISCONTINUED | OUTPATIENT
Start: 2022-04-05 | End: 2022-04-05 | Stop reason: HOSPADM

## 2022-04-05 RX ORDER — SUFENTANIL CITRATE 50 UG/ML
INJECTION EPIDURAL; INTRAVENOUS
Status: DISCONTINUED | OUTPATIENT
Start: 2022-04-05 | End: 2022-04-05 | Stop reason: HOSPADM

## 2022-04-05 RX ORDER — DOBUTAMINE HYDROCHLORIDE 200 MG/100ML
INJECTION INTRAVENOUS
Status: DISCONTINUED | OUTPATIENT
Start: 2022-04-05 | End: 2022-04-05 | Stop reason: HOSPADM

## 2022-04-05 RX ORDER — PROPOFOL 10 MG/ML
0-50 VIAL (ML) INTRAVENOUS
Status: DISCONTINUED | OUTPATIENT
Start: 2022-04-05 | End: 2022-04-11

## 2022-04-05 RX ORDER — INSULIN GLARGINE 100 [IU]/ML
1-50 INJECTION, SOLUTION SUBCUTANEOUS
Status: ACTIVE | OUTPATIENT
Start: 2022-04-05 | End: 2022-04-06

## 2022-04-05 RX ORDER — ONDANSETRON 2 MG/ML
4 INJECTION INTRAMUSCULAR; INTRAVENOUS
Status: DISCONTINUED | OUTPATIENT
Start: 2022-04-05 | End: 2022-05-06 | Stop reason: HOSPADM

## 2022-04-05 RX ORDER — MAGNESIUM SULFATE 100 %
4 CRYSTALS MISCELLANEOUS AS NEEDED
Status: DISCONTINUED | OUTPATIENT
Start: 2022-04-05 | End: 2022-05-06 | Stop reason: HOSPADM

## 2022-04-05 RX ORDER — ETOMIDATE 2 MG/ML
INJECTION INTRAVENOUS AS NEEDED
Status: DISCONTINUED | OUTPATIENT
Start: 2022-04-05 | End: 2022-04-05 | Stop reason: HOSPADM

## 2022-04-05 RX ORDER — FLUCONAZOLE 2 MG/ML
200 INJECTION, SOLUTION INTRAVENOUS EVERY 24 HOURS
Status: COMPLETED | OUTPATIENT
Start: 2022-04-06 | End: 2022-04-07

## 2022-04-05 RX ORDER — FENTANYL CITRATE 50 UG/ML
25 INJECTION, SOLUTION INTRAMUSCULAR; INTRAVENOUS
Status: DISCONTINUED | OUTPATIENT
Start: 2022-04-05 | End: 2022-05-06 | Stop reason: HOSPADM

## 2022-04-05 RX ORDER — CHLORHEXIDINE GLUCONATE 1.2 MG/ML
10 RINSE ORAL 2 TIMES DAILY
Status: COMPLETED | OUTPATIENT
Start: 2022-04-05 | End: 2022-04-10

## 2022-04-05 RX ORDER — MILRINONE LACTATE 0.2 MG/ML
0.38 INJECTION, SOLUTION INTRAVENOUS CONTINUOUS
Status: DISCONTINUED | OUTPATIENT
Start: 2022-04-05 | End: 2022-04-05

## 2022-04-05 RX ORDER — NALOXONE HYDROCHLORIDE 0.4 MG/ML
0.4 INJECTION, SOLUTION INTRAMUSCULAR; INTRAVENOUS; SUBCUTANEOUS AS NEEDED
Status: DISCONTINUED | OUTPATIENT
Start: 2022-04-05 | End: 2022-05-06 | Stop reason: HOSPADM

## 2022-04-05 RX ORDER — MUPIROCIN 20 MG/G
OINTMENT TOPICAL 2 TIMES DAILY
Status: COMPLETED | OUTPATIENT
Start: 2022-04-05 | End: 2022-04-10

## 2022-04-05 RX ORDER — POLYETHYLENE GLYCOL 3350 17 G/17G
17 POWDER, FOR SOLUTION ORAL DAILY
Status: DISCONTINUED | OUTPATIENT
Start: 2022-04-06 | End: 2022-04-10

## 2022-04-05 RX ORDER — IPRATROPIUM BROMIDE AND ALBUTEROL SULFATE 2.5; .5 MG/3ML; MG/3ML
3 SOLUTION RESPIRATORY (INHALATION)
Status: DISCONTINUED | OUTPATIENT
Start: 2022-04-05 | End: 2022-05-06 | Stop reason: HOSPADM

## 2022-04-05 RX ORDER — POTASSIUM CHLORIDE 29.8 MG/ML
20 INJECTION INTRAVENOUS
Status: COMPLETED | OUTPATIENT
Start: 2022-04-05 | End: 2022-04-05

## 2022-04-05 RX ORDER — PROPOFOL 10 MG/ML
INJECTION, EMULSION INTRAVENOUS AS NEEDED
Status: DISCONTINUED | OUTPATIENT
Start: 2022-04-05 | End: 2022-04-05 | Stop reason: HOSPADM

## 2022-04-05 RX ORDER — MIDAZOLAM HYDROCHLORIDE 1 MG/ML
INJECTION, SOLUTION INTRAMUSCULAR; INTRAVENOUS AS NEEDED
Status: DISCONTINUED | OUTPATIENT
Start: 2022-04-05 | End: 2022-04-05 | Stop reason: HOSPADM

## 2022-04-05 RX ORDER — SUCCINYLCHOLINE CHLORIDE 20 MG/ML
INJECTION INTRAMUSCULAR; INTRAVENOUS AS NEEDED
Status: DISCONTINUED | OUTPATIENT
Start: 2022-04-05 | End: 2022-04-05 | Stop reason: HOSPADM

## 2022-04-05 RX ORDER — DESMOPRESSIN ACETATE 4 UG/ML
INJECTION, SOLUTION INTRAVENOUS; SUBCUTANEOUS AS NEEDED
Status: DISCONTINUED | OUTPATIENT
Start: 2022-04-05 | End: 2022-04-05 | Stop reason: HOSPADM

## 2022-04-05 RX ORDER — MAGNESIUM SULFATE HEPTAHYDRATE 40 MG/ML
INJECTION, SOLUTION INTRAVENOUS AS NEEDED
Status: DISCONTINUED | OUTPATIENT
Start: 2022-04-05 | End: 2022-04-05 | Stop reason: HOSPADM

## 2022-04-05 RX ORDER — MIDAZOLAM HYDROCHLORIDE 1 MG/ML
1 INJECTION, SOLUTION INTRAMUSCULAR; INTRAVENOUS
Status: DISCONTINUED | OUTPATIENT
Start: 2022-04-05 | End: 2022-04-21

## 2022-04-05 RX ORDER — SODIUM CHLORIDE 9 MG/ML
INJECTION, SOLUTION INTRAVENOUS
Status: DISCONTINUED | OUTPATIENT
Start: 2022-04-05 | End: 2022-04-05 | Stop reason: HOSPADM

## 2022-04-05 RX ORDER — INSULIN LISPRO 100 [IU]/ML
INJECTION, SOLUTION INTRAVENOUS; SUBCUTANEOUS
Status: DISCONTINUED | OUTPATIENT
Start: 2022-04-05 | End: 2022-04-11

## 2022-04-05 RX ORDER — LEVOFLOXACIN 5 MG/ML
250 INJECTION, SOLUTION INTRAVENOUS EVERY 24 HOURS
Status: COMPLETED | OUTPATIENT
Start: 2022-04-06 | End: 2022-04-07

## 2022-04-05 RX ORDER — SODIUM CHLORIDE 0.9 % (FLUSH) 0.9 %
5-40 SYRINGE (ML) INJECTION EVERY 8 HOURS
Status: DISCONTINUED | OUTPATIENT
Start: 2022-04-05 | End: 2022-05-06 | Stop reason: HOSPADM

## 2022-04-05 RX ORDER — HEPARIN SODIUM 1000 [USP'U]/ML
INJECTION, SOLUTION INTRAVENOUS; SUBCUTANEOUS AS NEEDED
Status: DISCONTINUED | OUTPATIENT
Start: 2022-04-05 | End: 2022-04-05 | Stop reason: HOSPADM

## 2022-04-05 RX ADMIN — ALBUMIN (HUMAN) 250 ML: 12.5 INJECTION, SOLUTION INTRAVENOUS at 14:28

## 2022-04-05 RX ADMIN — PROTHROMBIN, COAGULATION FACTOR VII HUMAN, COAGULATION FACTOR IX HUMAN, COAGULATION FACTOR X HUMAN, PROTEIN C, PROTEIN S HUMAN, AND WATER 1108 INT'L UNITS: KIT at 13:33

## 2022-04-05 RX ADMIN — Medication 2.9 UNITS/HR: at 11:11

## 2022-04-05 RX ADMIN — SODIUM CHLORIDE, PRESERVATIVE FREE 10 ML: 5 INJECTION INTRAVENOUS at 21:08

## 2022-04-05 RX ADMIN — DEXMEDETOMIDINE HYDROCHLORIDE 1.2 MCG/KG/HR: 4 INJECTION, SOLUTION INTRAVENOUS at 20:41

## 2022-04-05 RX ADMIN — SUFENTANIL CITRATE 10 MCG: 50 INJECTION EPIDURAL; INTRAVENOUS at 14:16

## 2022-04-05 RX ADMIN — EPINEPHRINE 3 MCG/MIN: 1 INJECTION INTRAMUSCULAR; INTRAVENOUS; SUBCUTANEOUS at 08:09

## 2022-04-05 RX ADMIN — MIDAZOLAM 1 MG: 1 INJECTION INTRAMUSCULAR; INTRAVENOUS at 08:01

## 2022-04-05 RX ADMIN — SODIUM CHLORIDE 10 G/HR: 9 INJECTION, SOLUTION INTRAVENOUS at 08:09

## 2022-04-05 RX ADMIN — ETOMIDATE 6 MCG: 2 INJECTION INTRAVENOUS at 08:11

## 2022-04-05 RX ADMIN — NICARDIPINE HYDROCHLORIDE 5 MG/HR: 25 INJECTION, SOLUTION INTRAVENOUS at 17:35

## 2022-04-05 RX ADMIN — FENTANYL CITRATE 25 MCG: 0.05 INJECTION, SOLUTION INTRAMUSCULAR; INTRAVENOUS at 22:10

## 2022-04-05 RX ADMIN — ALBUMIN (HUMAN) 250 ML: 12.5 INJECTION, SOLUTION INTRAVENOUS at 13:59

## 2022-04-05 RX ADMIN — SODIUM CHLORIDE, POTASSIUM CHLORIDE, SODIUM LACTATE AND CALCIUM CHLORIDE: 600; 310; 30; 20 INJECTION, SOLUTION INTRAVENOUS at 07:38

## 2022-04-05 RX ADMIN — FAMOTIDINE 20 MG: 10 INJECTION INTRAVENOUS at 16:23

## 2022-04-05 RX ADMIN — SUCRALFATE 1 G: 1 TABLET ORAL at 21:14

## 2022-04-05 RX ADMIN — FLUCONAZOLE 200 MG: 2 INJECTION, SOLUTION INTRAVENOUS at 04:54

## 2022-04-05 RX ADMIN — DEXMEDETOMIDINE HYDROCHLORIDE 0.6 MCG/KG/HR: 100 INJECTION, SOLUTION, CONCENTRATE INTRAVENOUS at 13:37

## 2022-04-05 RX ADMIN — FENTANYL CITRATE 25 MCG: 0.05 INJECTION, SOLUTION INTRAMUSCULAR; INTRAVENOUS at 17:56

## 2022-04-05 RX ADMIN — PROPOFOL 25 MCG/KG/MIN: 10 INJECTION, EMULSION INTRAVENOUS at 22:56

## 2022-04-05 RX ADMIN — SUFENTANIL CITRATE 20 MCG: 50 INJECTION EPIDURAL; INTRAVENOUS at 14:52

## 2022-04-05 RX ADMIN — Medication 4 UNITS/HR: at 11:30

## 2022-04-05 RX ADMIN — VANCOMYCIN HYDROCHLORIDE 1250 MG: 10 INJECTION, POWDER, LYOPHILIZED, FOR SOLUTION INTRAVENOUS at 16:22

## 2022-04-05 RX ADMIN — VASOPRESSIN 0.02 UNITS/MIN: 20 INJECTION PARENTERAL at 13:04

## 2022-04-05 RX ADMIN — HEPARIN SODIUM 32000 UNITS: 1000 INJECTION, SOLUTION INTRAVENOUS; SUBCUTANEOUS at 10:54

## 2022-04-05 RX ADMIN — SODIUM CHLORIDE 2.5 MG/HR: 9 INJECTION, SOLUTION INTRAVENOUS at 15:08

## 2022-04-05 RX ADMIN — SODIUM CHLORIDE, PRESERVATIVE FREE 10 ML: 5 INJECTION INTRAVENOUS at 05:05

## 2022-04-05 RX ADMIN — SUFENTANIL CITRATE 0.3 MCG/KG/HR: 50 INJECTION EPIDURAL; INTRAVENOUS at 08:09

## 2022-04-05 RX ADMIN — SUFENTANIL CITRATE 10 MCG: 50 INJECTION EPIDURAL; INTRAVENOUS at 14:33

## 2022-04-05 RX ADMIN — LEVOFLOXACIN 500 MG: 5 INJECTION, SOLUTION INTRAVENOUS at 04:57

## 2022-04-05 RX ADMIN — SUCCINYLCHOLINE CHLORIDE 120 MG: 20 INJECTION, SOLUTION INTRAMUSCULAR; INTRAVENOUS at 08:11

## 2022-04-05 RX ADMIN — MIDAZOLAM 1 MG: 1 INJECTION INTRAMUSCULAR; INTRAVENOUS at 08:09

## 2022-04-05 RX ADMIN — ROCURONIUM BROMIDE 50 MG: 10 INJECTION INTRAVENOUS at 10:11

## 2022-04-05 RX ADMIN — MUPIROCIN: 20 OINTMENT TOPICAL at 17:37

## 2022-04-05 RX ADMIN — DEXMEDETOMIDINE HYDROCHLORIDE 0.8 MCG/KG/HR: 4 INJECTION, SOLUTION INTRAVENOUS at 16:13

## 2022-04-05 RX ADMIN — SODIUM CHLORIDE: 900 INJECTION, SOLUTION INTRAVENOUS at 07:38

## 2022-04-05 RX ADMIN — PROTAMINE SULFATE 250 MG: 10 INJECTION, SOLUTION INTRAVENOUS at 12:30

## 2022-04-05 RX ADMIN — DOBUTAMINE IN DEXTROSE 7 MCG/KG/MIN: 200 INJECTION, SOLUTION INTRAVENOUS at 12:01

## 2022-04-05 RX ADMIN — ALBUMIN (HUMAN) 250 ML: 12.5 INJECTION, SOLUTION INTRAVENOUS at 13:42

## 2022-04-05 RX ADMIN — PROPOFOL 30 MG: 10 INJECTION, EMULSION INTRAVENOUS at 08:11

## 2022-04-05 RX ADMIN — FAMOTIDINE 20 MG: 10 INJECTION INTRAVENOUS at 21:07

## 2022-04-05 RX ADMIN — NOREPINEPHRINE BITARTRATE 4 MCG/MIN: 1 INJECTION, SOLUTION, CONCENTRATE INTRAVENOUS at 12:02

## 2022-04-05 RX ADMIN — SODIUM CHLORIDE 10 ML/HR: 4.5 INJECTION, SOLUTION INTRAVENOUS at 16:21

## 2022-04-05 RX ADMIN — POTASSIUM CHLORIDE 20 MEQ: 29.8 INJECTION, SOLUTION INTRAVENOUS at 18:12

## 2022-04-05 RX ADMIN — AMIODARONE HYDROCHLORIDE 1 MG/MIN: 50 INJECTION, SOLUTION INTRAVENOUS at 17:24

## 2022-04-05 RX ADMIN — DESMOPRESSIN ACETATE 24 MCG: 4 SOLUTION INTRAVENOUS at 12:37

## 2022-04-05 RX ADMIN — SODIUM CHLORIDE 600 MG: 9 INJECTION, SOLUTION INTRAVENOUS at 04:18

## 2022-04-05 RX ADMIN — POTASSIUM CHLORIDE 20 MEQ: 29.8 INJECTION, SOLUTION INTRAVENOUS at 17:12

## 2022-04-05 RX ADMIN — CHLORHEXIDINE GLUCONATE 10 ML: 1.2 RINSE ORAL at 17:37

## 2022-04-05 RX ADMIN — MAGNESIUM SULFATE 2 G: 2 INJECTION INTRAVENOUS at 13:42

## 2022-04-05 RX ADMIN — SUFENTANIL CITRATE 10 MCG: 50 INJECTION EPIDURAL; INTRAVENOUS at 14:01

## 2022-04-05 RX ADMIN — VANCOMYCIN HYDROCHLORIDE 1000 MG: 1 INJECTION, POWDER, LYOPHILIZED, FOR SOLUTION INTRAVENOUS at 08:47

## 2022-04-05 RX ADMIN — ROCURONIUM BROMIDE 50 MG: 10 INJECTION INTRAVENOUS at 08:18

## 2022-04-05 NOTE — TELEPHONE ENCOUNTER
Called patient's wife Luis Gudino to let her know the procedure is underway. She can be reached by her cell number [phone] and is also in the surgical family waiting area.

## 2022-04-05 NOTE — BRIEF OP NOTE
BRIEF OP NOTE  Pre-Op Diagnosis: CONGESTIVE HEART FAILURE    Post-Op Diagnosis: CONGESTIVE HEART FAILURE      Procedure:   REDO STERNOTOMY   RIGHT GROIN CUTDOWN FOR CANNULATION with access of CVF and CFA  INSERTION OF LEFT VENTRICULAR ASSIST DEVICE (HMIII)  AORTIC VALVE CLOSURE with Park's stitch    Surgeon: Alexandre Mascoror MD    Assistant(s): Raciel Harrison PA-C    Anesthesia: General     Infusions: Amiodarone, precedex, insulin, epi, levo, vaso, Loren    Estimated Blood Loss:  2000ml    Cell Saver:  525ml    Specimens:   ID Type Source Tests Collected by Time Destination   1 : Left Ventricular Apical Core Fresh Heart  Rk Torres MD 4/5/2022 0902 Pathology       Drains and pacing wires: 1 bipolar ventricular wire, 5 hernandez drains    Complications: None    Findings: CHF    Implants:   Implant Name Type Inv.  Item Serial No.  Lot No. LRB No. Used Action   DEVICE IMPL VENTRCLR ASST KIT -- HEARTMATE III - [id number]  DEVICE IMPL VENTRCLR ASST KIT -- HEARTMATE III [id number] [employer] N/A Left 1 Implanted   PTFE Felt   N/A BARD [id number] N/A 1 Implanted   GRAFT VASC L20CM [id number] STRTCH [place] - [id number]  GRAFT VASC L20CM [id number] [employer] [place] [id number]  [employer]WD N/A N/A 1 Implanted

## 2022-04-05 NOTE — PROGRESS NOTES
Pharmacist Note - Vancomycin Dosing    Consult provided for this 69 y.o. male for indication of surgical ppx. Antibiotic regimen(s): vanc + levofloxacin  Patient on vancomycin PTA? NO     Recent Labs     22  0343 22  0338 22  1527   WBC 3.5* 3.5* 3.5*   CREA 0.91 1.10 1.17   BUN 17 21* 25*     Frequency of BMP: daily  Height: 185.4 cm  Weight: 78.2 kg  Est CrCl: 75-80 ml/min; UO: -- ml/kg/hr  Temp (24hrs), Av °F (36.7 °C), Min:97.8 °F (36.6 °C), Max:98.2 °F (36.8 °C)      MRSA Swab ordered (if applicable)? N/A    The plan below is expected to result in a target range of AUC/ENZO 400-600    Therapy will be initiated with 1250 mg IV every 18 hours x 48 hours. Pharmacy to follow patient daily and order levels / make dose adjustments as appropriate. *Vancomycin has been dosed used Bayesian kinetics software to target an AUC/ENZO of 400-600, which provides adequate exposure for an assumed infection due to MRSA with an ENZO of 1 or less while reducing the risk of nephrotoxicity as seen with traditional trough based dosing goals.

## 2022-04-05 NOTE — PROGRESS NOTES
4/4/22 2000: Bedside shift change report given to [place] (oncoming nurse) by Susan Avila RN (offgoing nurse). Report included the following information SBAR, Intake/Output, MAR, Recent Results, Cardiac Rhythm NSR and Alarm Parameters .      2030: 1st pre-op CHG bath given.     4/5/22 0000: Milrinone stopped. 0430: 2nd pre-op CHG bath given, leads replaced. 0600:  Wife at bedside    56: Transported to OR with OR staff and transport monitor.

## 2022-04-05 NOTE — PERIOP NOTES
Patient stated his full name, date of birth and procedure in CVICU; Pt to OR via bed. With all wheels locked, pt transferred to OR table via slide board by anesthesia and OR staff X 3. Right IJ Claudette Finger placed prior to coming to the OR.   Left radial arterial line placed intraop.

## 2022-04-05 NOTE — TELEPHONE ENCOUNTER
Updated the wife Divya Watkinsain around 12:40 that her  was off by pass and LVAD was on and working. He was doing well.

## 2022-04-05 NOTE — TELEPHONE ENCOUNTER
Returned call to cardiac connection, advised that all orders have been signed and faxed. They will re-fax any orders they do not have.

## 2022-04-05 NOTE — PROGRESS NOTES
1455- arrrived from [address] [facility] on monitor, vent, nitric 30 PPM, LVAD. Report from [facility] and CRNA. Leave patient intubated overnight, check neuro status in 2 hours if stable. Start nitric wean and heparin drip at midnight. 1500- low flows, MAP 87-90's. Anesthesia at bedside still giving nitro pushes. Orders from Xander Asencio NP to start cardene drip. 1508- cardene started for MAP 76.    1518- xray at bedside. 1520- per Terri, ETT needs to be advanced 2 cm. RT notified. 1534- ETT advanced by RT and tube clinton placed. RT decreased FiO2 to 80% for pO2 > 600.     1604-message sent to Xander Asencio NP for weaning parameters. Orders to wean dobutamine to 8 mcg.     1630- precedex decreased to 0.5 mcg to check neuro status. 1705- patient woke up appropriately. Moved all extremities, squeezed hands, and wiggled feet to commands. He opened his eyes and nodded his head to questions appropriately. Precedex increased back for comfort. 1719- cardene stopped. Patent having increase PVC's. VAD coordinator paged. 1940Melrena Stratton NP returned call. Orders to start amiodarone and decrease dobutamine to 7 mcg. 1745- patient still awake and appropriate. When asked if he has pain, he nods his head yes. Message sent to Yarelis Nieto NP for orders. 26- message sent to Yarelis Nieto NP regarding CI 1.9, PAD 12-14, CVP 9.    1920- Yarelis [place] called the unit. Update given on hemdynamic changes. Orders to use cardene (already restarted) to reduce his afterload. Also inform her dobutamine was increased back to 8 mcg, she stated to leave it there. 2000- Bedside and Verbal shift change report given to KIRK Chappell (oncoming nurse) by Honey Acosta RN (offgoing nurse).  Report included the following information SBAR, Kardex, Recent Results and Cardiac Rhythm SR.

## 2022-04-05 NOTE — PROGRESS NOTES
[facility]    ICU TEAM Progress Note    Name: Radha Bartlett   : 1945   MRN: [id number]   Date: 2022           ICU Assessment     Post LVAD         ICU Comprehensive Plan of Care: This is a 36-year-old male with past medical history of ischemic cardiomyopathy (EF 15 to 20%), CAD status post four-vessel CABG (), hypertension, left upper lobe adenocarcinoma status post radiotherapy (), MGUS who is postop day 0 from LVAD placement for stage D systolic heart failure. Intra-op course course complicated by bleeding requiring multiple rounds of pRBC's, platelets, FFP, cellsaver, and cryoprecipitate. Intra-op CHEL significant for moderate to severe RV dysfunction. General/neuro: Patient is sedated on Precedex infusion. Sedation vacation to assess mental status as per protocol. Respiratory: Postop respiratory failure, intubation day #0. Inhaled nitric oxide at 30 ppm for severe RV dysfunction. Plan for scheduled extubation tomorrow. Cardiac: Status post HeartMate 3 LVAD and aortic valve closure, day 0. Dobutamine and epinephrine at 7 mcg and 2 mcg respectively for inotropic support. Nicardipine infusion targeting MAP 70-75. LVAD running at 4600 rpm generating around 3 LPM of flows. GI: N.p.o.    ID: Perioperative antibiotics including Vanco, Levaquin, fluconazole as per LVAD protocol. Renal: No acute issues. Prophylaxis: Standard low-dose heparin to be initiated at midnight if chest output within acceptable limits, famotidine. Subjective:   Progress Note: 2022      Reason for ICU Admission: Post LVAD    HPI: As Above    Overnight Events:   2022      POD:  Day of Surgery    S/P:   Procedure(s):  REDO STERNOTOMY; RIGHT GROIN CUTDOWN FOR CANNULATION;  INSERTION OF LEFT VENTRICULAR ASSIST DEVICE (HMIII); AORTIC VALVE CLOSURE CHEL BY DR. Gordan Snellen.     Active Problem List:     Problem List  Date Reviewed: 3/17/2022          Codes Class    HFrEF (heart failure with reduced ejection fraction) (HCC) ICD-10-CM: I50.20  ICD-9-CM: 428.20         NSTEMI (non-ST elevated myocardial infarction) ([facility] ICD-10-CM: I21.4  ICD-9-CM: 410.70         Acute on chronic clinical systolic heart failure (HCC) ICD-10-CM: I50.23  ICD-9-CM: 428.23         Active advance directive on file ICD-10-CM: Z78.9  ICD-9-CM: V49.89         Type 2 diabetes, controlled, with peripheral circulatory disorder (HCC) ICD-10-CM: E11.51  ICD-9-CM: 250.70, 443.81         CHF, stage C ([facility] ICD-10-CM: I50.9  ICD-9-CM: 428.0         Mixed hyperlipidemia ICD-10-CM: E78.2  ICD-9-CM: 272.2         Proteinuria ICD-10-CM: R80.9  ICD-9-CM: 791.0         BPH without obstruction/lower urinary tract symptoms ICD-10-CM: N40.0  ICD-9-CM: 600.00         Hematuria, gross ICD-10-CM: R31.0  ICD-9-CM: 599.71         Cardiac arrest ([facility] ICD-10-CM: I46.9  ICD-9-CM: 427.5         Insomnia, unspecified ICD-10-CM: G47.00  ICD-9-CM: 780.52         Encounter for long-term (current) use of other medications ICD-10-CM: Z79.899  ICD-9-CM: V58.69         Calculus of kidney ICD-10-CM: N20.0  ICD-9-CM: 592.0         Coronary atherosclerosis of native coronary artery ICD-10-CM: I25.10  ICD-9-CM: 414.01         Benign neoplasm of colon ICD-10-CM: D12.6  ICD-9-CM: 211.3         Unspecified sleep apnea ICD-10-CM: G47.30  ICD-9-CM: 780.57         Reflux esophagitis ICD-10-CM: K21.00  ICD-9-CM: 530.11               Past Medical History:      has a past medical history of CAD (coronary artery disease) ('90 '97, '13 x 2), Calculus of kidney, Colonic polyps, Congestive heart failure, unspecified, Diabetes ([facility] Gastritis, Hypercholesterolemia, and Sleep apnea.     Past Surgical History:      has a past surgical history that includes hx heent; colonoscopy (04/06/2011); endoscopy, colon, diagnostic; hx coronary artery bypass graft (8/22/12); hx pacemaker placement (1/30/13); pr cardiac surg procedure unlist (2012); and colonoscopy (N/A, 3/2/2022). Home Medications:     Prior to Admission medications    Medication Sig Start Date End Date Taking? Authorizing Provider   potassium chloride SR (K-TAB) 20 mEq tablet Take 2 Tablets by mouth two (2) times a day. 3/9/22  Yes Harry Wong NP   milrinone (PRIMACOR) 20 mg/100 mL (200 mcg/mL) infusion 28.5 mcg/min by IntraVENous route continuous. 3/3/22  Yes Harry Wong NP   tamsulosin (FLOMAX) 0.4 mg capsule TAKE 1 CAPSULE DAILY 3/2/22  Yes Louise Gustafson MD   bumetanide (BUMEX) 2 mg tablet Take 1 Tablet by mouth two (2) times a day. Patient taking differently: Take 2 mg by mouth two (2) times a day. 2 mg in am 1mg in evening 2/11/22  Yes Amber Waggoner NP   prasugreL (Effient) 10 mg tablet Take 10 mg by mouth daily. Yes Provider, Historical   metFORMIN (GLUCOPHAGE) 500 mg tablet Take 1 Tablet by mouth two (2) times daily (with meals). 1/11/22  Yes Louise Gustafson MD   ranolazine ER (Ranexa) 500 mg SR tablet Take 1 Tablet by mouth two (2) times a day. 1/11/22  Yes Louise Gustafson MD   eplerenone (INSPRA) 50 mg tab tablet Take 1 Tablet by mouth daily. 1/11/22  Yes Louise Gustafson MD   isosorbide mononitrate ER (IMDUR) 30 mg tablet Take 1 Tablet by mouth every twelve (12) hours. 1/4/22  Yes Heydi Geiger NP   hydrALAZINE (APRESOLINE) 50 mg tablet TAKE 1 TABLET BY MOUTH THREE TIMES DAILY 12/17/21  Yes Mo Stewart NP   dapagliflozin Alexia Salvo) 10 mg tab tablet Take 1 Tablet by mouth daily. 11/12/21  Yes Chery Aragon NP   omeprazole (PRILOSEC) 20 mg capsule TAKE 1 CAPSULE BY MOUTH DAILY FOR INDIGESTION 7/6/21  Yes Ary PAREDES NP   rosuvastatin (CRESTOR) 10 mg tablet TAKE 1 TABLET NIGHTLY 4/26/21  Yes Louise Gustafson MD   aspirin delayed-release 81 mg tablet Take 81 mg by mouth daily. Yes Provider, Historical   benzonatate (TESSALON) 200 mg capsule Take 200 mg by mouth three (3) times daily as needed for Cough.   Patient not taking: Reported on 3/7/2022    Provider, Historical   diphenhydrAMINE (Benadryl Allergy) 25 mg tablet Take 25 mg by mouth every six (6) hours as needed. Needed every night d/t picc line causing itching  Patient not taking: Reported on 4/3/2022    Provider, Historical   glucose blood VI test strips (OneTouch Ultra Test) strip USE TO TEST BLOOD SUGAR DAILY 21   Daniel Gustafson MD   OneTouch Delica Plus Lancet 33 gauge misc USE TO TEST BLOOD SUGAR DAILY 21   Daniel Gustafson MD   lancets misc Use to test blood sugar daily 20   Daniel Gustafson MD   acetaminophen (TYLENOL EXTRA STRENGTH) 500 mg tablet Take  by mouth every six (6) hours as needed. Patient not taking: Reported on 3/17/2022    Provider, Historical       Allergies/Social/Family History: Allergies   Allergen Reactions    Oxycodone Anaphylaxis    Pcn [Penicillins] Hives      Social History     Tobacco Use    Smoking status: Never Smoker    Smokeless tobacco: Never Used   Substance Use Topics    Alcohol use: Yes     Alcohol/week: 0.0 standard drinks     Comment:  VERY RARE      Family History   Problem Relation Age of Onset    Heart Disease Mother         MI    Heart Disease Father         CAD & PVD    Lung Disease Father     Cancer Father         lung    Diabetes Maternal Grandmother     Heart Disease Other         CAD    Stroke Sister        Review of Systems:     A comprehensive review of systems was negative except for that written in the HPI.     Objective:   Vital Signs:  Visit Vitals  /72   Pulse 75   Temp 98.2 °F (36.8 °C)   Resp 24   Wt 78.2 kg (172 lb 6.4 oz)   SpO2 97%   BMI 22.75 kg/m²      O2 Device: None (Room air) Temp (24hrs), Av °F (36.7 °C), Min:97.8 °F (36.6 °C), Max:98.2 °F (36.8 °C)    CVP (mmHg): 7 mmHg (22 0600)      Intake/Output:     Intake/Output Summary (Last 24 hours) at 2022 1348  Last data filed at 2022 1339  Gross per 24 hour   Intake 4910.5 ml   Output 1575 ml   Net 3335.5 ml       Physical Exam:    General appearance: no distress, appears stated age  Respiratory: Decreased breath sounds b/l  Cardiac: Normal Sinus rhythm   Abdomen: soft, hypoactive sounds  Extremities: warm, perfused. LABS AND  DATA: Personally reviewed  Recent Labs     04/05/22  1215 04/05/22 0343 04/04/22 0338 04/04/22 0338   WBC  --  3.5*  --  3.5*   HGB  --  11.9*  --  11.9*   HCT  --  37.4  --  36.9   * 119*   < > 137*    < > = values in this interval not displayed. Recent Labs     04/05/22 0343 04/04/22 0338   * 134*   K 4.6 4.1    101   CO2 24 29   BUN 17 21*   CREA 0.91 1.10   * 107*   CA 9.0 8.7   MG 2.4 2.3     Recent Labs     04/05/22 0343 04/04/22 0338   * 140*   TP 6.1* 6.5   ALB 3.2* 3.0*   GLOB 2.9 3.5     Recent Labs     04/05/22  0343 04/04/22  0338   INR 1.2* 1.1   PTP 12.0* 11.5*      No results for input(s): PHI, PCO2I, PO2I, FIO2I in the last 72 hours. No results for input(s): CPK, CKMB, TROIQ, BNPP in the last 72 hours. Hemodynamics:   PAP: PAP Systolic: 42 (98/99/57 8139) CO: CO (l/min): 4.2 l/min (04/05/22 0700)   Wedge:   CI: CI (l/min/m2): 2 l/min/m2 (04/05/22 0700)   CVP:  CVP (mmHg): 7 mmHg (04/05/22 0600) SVR:       PVR:       Ventilator Settings:  Mode Rate Tidal Volume Pressure FiO2 PEEP                    Peak airway pressure:      Minute ventilation:          MEDS: Reviewed    Chest X-Ray:  CXR Results  (Last 48 hours)               04/03/22 1353  XR CHEST PORT Final result    Impression:  Tubes and lines as above. Possible mild pulmonary edema       Narrative:  EXAM: XR CHEST PORT       INDICATION: MAC/swan placements       COMPARISON: 2/1/2022       FINDINGS: A portable AP radiograph of the chest was obtained at 1351 hours. Right IJ Hollywood-Senthil catheter is noted with the tip in the main pulmonary artery. Left subclavian pacemaker is stable. Median sternotomy changes are noted. Chris Jacobsonyles Possible mild pulmonary edema.  Heart size is enlarged. .  The bones and soft   tissues are grossly within normal limits. ECHO:        Multidisciplinary Rounds Completed: Yes    ABCDEF Bundle/Checklist Completed:  Yes    SPECIAL EQUIPMENT  PA Catheter    DISPOSITION  Stay in ICU    CRITICAL CARE CONSULTANT NOTE  I had a face to face encounter with the patient, reviewed and interpreted patient data including clinical events, labs, images, vital signs, I/O's, and examined patient. I have discussed the case and the plan and management of the patient's care with the consulting services, the bedside nurses and the respiratory therapist.      NOTE OF PERSONAL INVOLVEMENT IN CARE   This patient has a high probability of imminent, clinically significant deterioration, which requires the highest level of preparedness to intervene urgently. I participated in the decision-making and personally managed or directed the management of the following life and organ supporting interventions that required my frequent assessment to treat or prevent imminent deterioration. I personally spent 50 minutes of critical care time. This is time spent at this critically ill patient's bedside actively involved in patient care as well as the coordination of care. This does not include any procedural time which has been billed separately.     Michele Morales DO  Staff Intensivist/Anesthesiologist  [facility] Critical Care  4/5/2022

## 2022-04-05 NOTE — PROGRESS NOTES
[address] [place] [address] in [facility]  Inpatient Progress Note      Patient name: Debi Dugan  Patient : 1945  Patient MRN: [id number]  Consulting MD: Makeda Ybarra MD  Date of service: 22    REASON FOR REFERRAL:  Management of chronic systolic heart failure    PLAN OF CARE:   · Severe ischemic cardiomyopathy, LVEF 15-20%; stage D, NYHA class IV symptoms; patient unerwent LVAD-DT evaluation; approved for implant by MRB and age limit d/w [facility] transplant center  · Has been maintained OP on chronic infusion of milrinone while optimizing nutritional status and muscle conditioning   · Electively admitted 4/3 for hemodynamic optimization prior to LVAD implant    ? Lines done  ? Abx ordered   ? Blood products ordered   ? Consents today  ? Timing to dc milrinone per CTS  ? No Vitamin K     PHYSICAL EXAM:  Visit Vitals  /73 (BP 1 Location: Left upper arm, BP Patient Position: At rest)   Pulse 79   Temp 98.1 °F (36.7 °C)   Resp 24   Wt 167 lb 5.3 oz (75.9 kg)   SpO2 97%   BMI 22.08 kg/m²     General: Patient is well developed, well-nourished in no acute distress  HEENT: Normocephalic and atraumatic. No scleral icterus. Pupils are equal, round and reactive to light and accomodation. No conjunctival injection. Oropharynx is clear. Neck: Supple. No evidence of thyroid enlargements or lymphadenopathy. JVD: Cannot be appreciated   Lungs: Breath sounds are equal and clear bilaterally. No wheezes, rhonchi, or rales. Heart: Regular rate and rhythm with normal S1 and S2. No murmurs, gallops or rubs. Abdomen: Soft, no mass or tenderness. No organomegaly or hernia. Bowel sounds present. Genitourinary and rectal: deferred  Extremities: No cyanosis, clubbing, or edema. Neurologic: No focal sensory or motor deficits are noted. Grossly intact. Psychiatric: Awake, alert an doriented x 3. Appropriate mood and affect. Skin: Warm, dry and well perfused.  No lesions, nodules or rashes are noted. REVIEW OF SYSTEMS:  General: Denies fever, night sweats. Ear, nose and throat: Denies difficulty hearing, sinus problems, runny nose, post-nasal drip, ringing in ears, mouth sores, loose teeth, ear pain, nosebleeds, sore throate, facial pain or numbess  Cardiovascular: see above in the interval history  Respiratory: Denies cough, wheezing, sputum production, hemoptysis. Gastrointestinal: Denies heartburn, constipation, intolerance to certain foods, diarrhea, abdominal pain, nausea, vomiting, difficulty swallowing, blood in stool  Kidney and bladder: Denies painful urination, frequent urination, urgency, prostate problems and impotence  Musculoskeletal: Denies joint pain, muscle weakness  Skin and hair: Denies change in existing skin lesions, hair loss or increase, breast changes    PAST MEDICAL HISTORY:  Past Medical History:   Diagnosis Date    CAD (coronary artery disease) '90 '97, '13 x 2    [facility]    Calculus of kidney     Colonic polyps     Congestive heart failure, unspecified     Diabetes ([facility]     Gastritis     Hypercholesterolemia     Sleep apnea        PAST SURGICAL HISTORY:  Past Surgical History:   Procedure Laterality Date    COLONOSCOPY  04/06/2011    16, due 21    COLONOSCOPY N/A 3/2/2022    COLONOSCOPY    :- performed by Nenita Marshall MD at [facility] ENDOSCOPY    ENDOSCOPY, COLON, DIAGNOSTIC      11, due 16    HX CORONARY ARTERY BYPASS GRAFT  8/22/12    x 4 vessel by MISSY Ramirez    HX HEENT      LASIK    HX PACEMAKER PLACEMENT  1/30/13    HI CARDIAC SURG PROCEDURE UNLIST  2012    x 4 vessels       FAMILY HISTORY:  Family History   Problem Relation Age of Onset    Heart Disease Mother         MI    Heart Disease Father         CAD & PVD    Lung Disease Father     Cancer Father         lung    Diabetes Maternal Grandmother     Heart Disease Other         CAD    Stroke Sister        SOCIAL HISTORY:  Social History     Socioeconomic History    Marital status:    Tobacco Use    Smoking status: Never Smoker    Smokeless tobacco: Never Used   Vaping Use    Vaping Use: Never used   Substance and Sexual Activity    Alcohol use: Yes     Alcohol/week: 0.0 standard drinks     Comment:  VERY RARE    Drug use: No       LABORATORY RESULTS:     Labs Latest Ref Rng & Units 4/4/2022 4/3/2022 4/3/2022 3/4/2022 3/3/2022 3/2/2022 3/1/2022   WBC 4.1 - 11.1 K/uL 3.5(L) - 3. 5(L) 2. 5(L) 2. 5(L) 2. 9(L) 3.0(L)   RBC 4.10 - 5.70 M/uL 4.79 - 4.85 4.47 4.57 4.95 5.04   Hemoglobin 12.1 - 17.0 g/dL 11. 9(L) - 12.1 10. 7(L) 10. 9(L) 11. 7(L) 11. 8(L)   Hematocrit 36.6 - 50.3 % 36.9 - 37.5 33. 7(L) 34. 7(L) 37.2 37.7   MCV 80.0 - 99.0 FL 77. 0(L) - 77. 3(L) 75. 4(L) 75. 9(L) 75. 2(L) 74. 8(L)   Platelets 554 - 348 K/uL 137(L) - 144(L) 120(L) 120(L) 140(L) 154   Lymphocytes 12 - 49 % - - - 27 26 26 18   Monocytes 5 - 13 % - - - 13 12 12 14(H)   Eosinophils 0 - 7 % - - - 6 3 3 2   Basophils 0 - 1 % - - - 1 0 1 1   Albumin 3.5 - 5.0 g/dL 3. 0(L) - 3. 4(L) 2. 8(L) 2. 8(L) 3.0(L) 3.6   Calcium 8.5 - 10.1 MG/DL 8.7 - 9.3 8.4(L) 8.8 9.0 9.4   Glucose 65 - 100 mg/dL 107(H) - 132(H) 112(H) 105(H) 94 134(H)   BUN 6 - 20 MG/DL 21(H) - 25(H) 14 11 16 21(H)   Creatinine 0.70 - 1.30 MG/DL 1.10 - 1.17 1.05 1.01 1.03 1.18   Sodium 136 - 145 mmol/L 134(L) - 132(L) 136 136 132(L) 132(L)   Potassium 3.5 - 5.1 mmol/L 4.1 3.6 3.7 3.5 3. 4(L) 3.6 3.6   TSH 0.36 - 3.74 uIU/mL - - - - - - 1.68   LDH 85 - 241 U/L - - - - - 145 -   Some recent data might be hidden     Lab Results   Component Value Date/Time    TSH 1.68 03/01/2022 11:50 AM    TSH 1.47 05/26/2021 10:44 PM    TSH 1.97 05/20/2021 04:28 PM    TSH 1.41 02/09/2021 03:05 PM    TSH 1.740 08/14/2018 09:58 AM    TSH 1.710 10/18/2017 12:00 AM       ALLERGY:  Allergies   Allergen Reactions    Oxycodone Anaphylaxis    Pcn [Penicillins] Hives        CURRENT MEDICATIONS:    Current Facility-Administered Medications:     albumin human 5% (BUMINATE) solution 12.5 g, 12.5 g, IntraVENous, Q12H, Radha JACOBSON, NP, 12.5 g at 04/04/22 0939    dextrose 10 % infusion 0-250 mL, 0-250 mL, IntraVENous, PRN, Chris Wong NP    insulin lispro (HUMALOG) injection, , SubCUTAneous, AC&HS, Chris Wong NP    [START ON 4/5/2022] albumin human 5% (BUMINATE) solution 25 g, 25 g, IntraVENous, ONCE, Annie Najera MD    [START ON 4/5/2022] DOBUTamine (DOBUTREX) 1,000 mg/250 mL (4,000 mcg/mL) infusion, 0-10 mcg/kg/min, IntraVENous, TITRATE, Annie Najera MD    [START ON 4/5/2022] heparin (porcine) in 0.9% NaCl 30,000 unit/1,000 mL perfusion irrigation 50-1,000 mL, 50-1,000 mL, Other, PRN, MD Adriana Carrasco List ON 4/5/2022] magnesium sulfate 2 g/50 ml IVPB (premix or compounded), 2 g, IntraVENous, ONCE, Annie Najera MD    [START ON 4/5/2022] nitroglycerin (Tridil) 200 mcg/ml infusion, 0-20 mcg/min, IntraVENous, TITRATE, Annie Najera MD Kelly Chris Cranston List ON 4/5/2022] potassium chloride 20 mEq in 50 ml IVPB, 20 mEq, IntraVENous, ONCE, Annie Najera MD Kelly Chris Cranston List ON 4/5/2022] aminocaproic acid (AMICAR) 15 g in 0.9% sodium chloride 150 mL infusion, 1 g/hr, IntraVENous, CONTINUOUS, Annie Najera MD Kelly Chris Cranston List ON 4/5/2022] amiodarone (CORDARONE) 900 mg/250 ml D5W infusion, 0.5-1 mg/min, IntraVENous, TITRATE, Annie Najera MD    [START ON 4/5/2022] dexmedeTOMidine in 0.9 % NaCl (PRECEDEX) 400 mcg/100 mL (4 mcg/mL) infusion soln, 0.1-1.5 mcg/kg/hr, IntraVENous, TITRATE, Annie Najera MD Kelly Chris Cranston List ON 4/5/2022] EPINEPHrine (ADRENALIN) 10 mg in 0.9% sodium chloride 250 mL infusion, 0-10 mcg/min, IntraVENous, TITRATE, Annie Najera MD    [START ON 4/5/2022] insulin regular (NOVOLIN R, HUMULIN R) 100 Units in 0.9% sodium chloride 100 mL infusion, 0-50 Units/hr, IntraVENous, TITRATE, Annie Najera MD Kelly Chris Cranston List ON 4/5/2022] niCARdipine (CARDENE) 25 mg in 0.9% sodium chloride 250 mL infusion, 0-15 mg/hr, IntraVENous, TITRATE, Melodie Najera MD KULWINDER  [facility] ON 4/5/2022] nitroglycerin 0.1 mg/mL in D5W injection,  mcg, IntraVENous, PRN, Makeda Ybarra MD    [START ON 4/5/2022] NOREPINephrine (LEVOPHED) 32,000 mcg in dextrose 5% 250 mL (128 mcg/mL) infusion, 0.5-16 mcg/min, IntraVENous, TITRATE, Cassius Najera MD  [facility] ON 4/5/2022] PHENYLephrine (MAINOR-SYNEPHRINE) 20 mg in 0.9% sodium chloride 250 mL infusion,  mcg/min, IntraVENous, TITRATE, Cassius Najera MD  [facility] ON 4/5/2022] PHENYLephrine (MAINOR-SYNEPHRINE) 100 mg in 0.9% sodium chloride 250 mL infusion,  mcg/min, IntraVENous, TITRATE, Cassius Najera MD  [facility] ON 4/5/2022] vancomycin (VANCOCIN) 1,000 mg in sodium chloride irrigation 0.9 % 500 mL irrigation, , Irrigation, ONCE, Cassius Najera MD  [facility] ON 4/5/2022] vasopressin (VASOSTRICT) 20 Units in 0.9% sodium chloride 100 mL infusion, 0-0.04 Units/min, IntraVENous, TITRATE, Cassius Najera MD  [facility] ON 4/5/2022] amiodarone (CORDARONE) 150 mg in dextrose 5% 100 mL bolus infusion, 150 mg, IntraVENous, ONCE, Cassius Najera MD    [START ON 4/5/2022] protamine injection 500 mg, 500 mg, IntraVENous, ONCE, Cassius Najera MD    aspirin delayed-release tablet 81 mg, 81 mg, Oral, DAILY, Polliard, Chente Sites T, NP, 81 mg at 04/04/22 0905    hydrALAZINE (APRESOLINE) tablet 50 mg, 50 mg, Oral, TID, Polliard, Chente Sites T, NP, 50 mg at 04/04/22 1623    milrinone (PRIMACOR) 20 MG/100 ML D5W infusion, 0.375 mcg/kg/min, IntraVENous, CONTINUOUS, Mariposa JACOBSON, EARNESTINE, Last Rate: 8.5 mL/hr at 04/04/22 1958, 0.375 mcg/kg/min at 04/04/22 1958    pantoprazole (PROTONIX) tablet 40 mg, 40 mg, Oral, ACB, Polliard, Chente Sites T, NP, 40 mg at 04/04/22 0707    ranolazine ER (RANEXA) tablet 500 mg, 500 mg, Oral, BID, Polliard, Chente Sites T, NP, 500 mg at 04/04/22 1910    rosuvastatin (CRESTOR) tablet 10 mg, 10 mg, Oral, QHS, Polliard, Chente Sites T, NP, 10 mg at 04/03/22 2118    tamsulosin (FLOMAX) capsule 0.4 mg, 0.4 mg, Oral, DAILY, Linda Ventura, NP, 0.4 mg at 04/04/22 0459    sodium chloride (NS) flush 5-40 mL, 5-40 mL, IntraVENous, Q8H, Venecia Wong, NP, 10 mL at 04/04/22 1434    sodium chloride (NS) flush 5-40 mL, 5-40 mL, IntraVENous, PRN, Marvin, Mirian Bucy, NP    0.9% sodium chloride infusion, 9 mL/hr, IntraVENous, CONTINUOUS, Lucyiard, Mirian Bucy, NP, Last Rate: 6 mL/hr at 04/03/22 1300, 6 mL/hr at 04/03/22 1300    acetaminophen (TYLENOL) tablet 650 mg, 650 mg, Oral, Q4H PRN, Mirian Wong, NP, 650 mg at 04/03/22 1919    mupirocin (BACTROBAN) 2 % ointment 1 g, 1 g, Both Nostrils, BID, Mirian Wong, NP    chlorhexidine (PERIDEX) 0.12 % mouthwash 15 mL, 15 mL, Oral, Q12H, Venecia Wong, NP, 15 mL at 04/04/22 0940    [START ON 4/5/2022] rifAMPin (RIFADIN) 600 mg in 0.9% sodium chloride 100 mL IVPB, 600 mg, IntraVENous, ONCE, LucyiardMirian Bucy, NP  Love  [START ON 4/5/2022] fluconazole (DIFLUCAN) 200mg/100 mL IVPB (premix), 200 mg, IntraVENous, ONCE, Polliard, Mirian Bucy, NP  Love  [START ON 4/5/2022] levoFLOXacin (LEVAQUIN) 500 mg in D5W IVPB, 500 mg, IntraVENous, ONCE, Venecia Wong, NP    senna-docusate (PERICOLACE) 8.6-50 mg per tablet 1 Tablet, 1 Tablet, Oral, DAILY, Mirian Wong Bucjen, NP, 1 Tablet at 04/04/22 1100    polyethylene glycol (MIRALAX) packet 17 g, 17 g, Oral, DAILY, Polliard, Mirian Bucy, NP, 17 g at 04/04/22 0906    glucose chewable tablet 16 g, 4 Tablet, Oral, PRN, Mirian Wong, NP    dextrose 10 % infusion 0-250 mL, 0-250 mL, IntraVENous, PRN, Mirian Wong, NP    glucagon (GLUCAGEN) injection 1 mg, 1 mg, IntraMUSCular, PRN, Linda Ventura, NP    PATIENT CARE TEAM:  Patient Care Team:  Charlotte Calle MD as PCP - General (Internal Medicine)  Charlotte Calle MD as PCP - [address] [place] [address] Provider  Arcelia Mcelroy MD as Physician (Cardiology)  Trent Crespo MD as Physician (Gastroenterology)  Jacklyn Blackwell MD as Physician (Orthopedic Surgery)  Terence Corado MD as Physician (Ophthalmology)  Felipa Onofre MD (Cardiology)  Ben Velazquez MD (Cardiology)  Leena Lopez, RN as Care Transitions Nurse     Thank you for allowing me to participate in this patient's care.     Manda Cole MD PhD  [address] [place] [address], [address]  Phone: [phone]  Fax: [phone]

## 2022-04-05 NOTE — ANESTHESIA PREPROCEDURE EVALUATION
Relevant Problems   RESPIRATORY SYSTEM   (+) Unspecified sleep apnea      CARDIOVASCULAR   (+) CHF, stage C (HCC)   (+) Cardiac arrest (HCC)   (+) Coronary atherosclerosis of native coronary artery   (+) NSTEMI (non-ST elevated myocardial infarction) (HCC)      RENAL FAILURE   (+) Calculus of kidney       Anesthetic History               Review of Systems / Medical History  Patient summary reviewed, nursing notes reviewed and pertinent labs reviewed    Pulmonary        Sleep apnea           Neuro/Psych              Cardiovascular          CHF  Dysrhythmias   CAD and hyperlipidemia    Exercise tolerance: <4 METS  Comments: Global Hypokinesis EF 10-15 percent, severe RV    GI/Hepatic/Renal     GERD    Renal disease       Endo/Other    Diabetes         Other Findings              Physical Exam    Airway  Mallampati: I  TM Distance: 4 - 6 cm  Neck ROM: normal range of motion   Mouth opening: Normal     Cardiovascular    Rhythm: regular  Rate: normal        Comments: Strong radial pulses Dental  No notable dental hx       Pulmonary  Breath sounds clear to auscultation               Abdominal  GI exam deferred       Other Findings            Anesthetic Plan    ASA: 4  Anesthesia type: general    Monitoring Plan: Arterial line, BIS, Continuous noninvasive hemodynamic monitoring, CVP, [place] and CHEL    Post procedure ventilation   Induction: Intravenous  Anesthetic plan and risks discussed with: Patient

## 2022-04-05 NOTE — PROGRESS NOTES
[facility] in [place], [place]  Heart Failure History and Physical    Patient name: Alfonso Becerra Sr  Patient : 1945  Patient MRN: [id number]  Date of service: 22    CHIEF COMPLAINT:  HFrEF     PLAN OF CARE:   · Severe ischemic cardiomyopathy, LVEF 15-20%; stage D, NYHA class IV symptoms; patient unerwent LVAD-DT evaluation; approved for implant by [facility]   · Has been maintained OP on chronic infusion of milrinone while optimizing nutritional status and muscle conditioning   · Electively admitted 4/3 for hemodynamic optimization prior to LVAD implant    · Lines placed  · Abx ordered   · Blood products ordered   · Consents today  · No Vitamin K   · Hold milrinone at midnight tonight    RECOMMENDATIONS:   Continue milrinone 0.375 mcg/kg/min- hold at midnight tonight per Dr. Marcin Barrera due to RV dysfunction   ACEi/ARB/ARNi/MRA discontinued in anticipation of cardiac surgery  Continue current dose of Hydralazine 50 TID; hold Imdur in anticipation of Loren   Liberalize fluid intake   Albumin 12.5Gm Q12 hrs  Continue ASA 81 mg daily   Effient d/c'd 3/27; confirmed patient has not taken   Continue current dose of rosuvastatin 10 mg daily  Continue ranolazine 500 mg BID (for HR and angina control)  Continue bowel regimen  SSI   Continue CPAP therapy IP   HF labs   IV venofer today  Advanced care plan forms completed  Palliative Care consult per routine today   Genetic test negative   PYP negative   Strict I/O, daily weights, Na+ restricted diet   Begin VAD education     IMPRESSION:  Fatigue at rest  Shortness of breath with exertion  Acute on chronic systolic heart failure  · Stage D, NYHA class IV symptoms improved to class II on inotropes  · Non-ischemic cardiomyopathy, LVEF 20% with LVEDD 6.2  · RV dysfunction, TAPSE 1.22 (prelimnary read)  · TBili 1.5 likely from cardiac congestion  At risk of sudden cardiac death  · Recent cardiac arrest · S/p ICD (1/2013, [employer] followed by [facility]); New implant on 10/21/2021 [employer]  Coronary artery disease  · S/p multiple interventions  · S/p 4V CABG (8/2012)  · LHC (7/2019) severe stenosis of LIMA to LAD anastomosis site, SVG graft to OM is occluded, SVG graft to RCA 40-50%, LAD occluded proximally, severe proximal LCx, RCA is the long . · PET (6/2019) EF 24% with anterior lateral and inferior reversible defect  Cardiac risk factors  · HTN  · HL  · DM2  · SRUTHI on CPAP  · MIld carotid stenosis  Anemia, microcytic  · Iron deficiency  DVT with filter  Pulmonary nodules   Dysphagia     CARDIAC IMAGING:  Echo (3/2/22)   · LV 10-15%  · Mod-severe MR     Echo (11/23/21):  · LV 15-20%. · Mod-severe, MR, mod-TR    Echo (5/20/21)  · LV: Estimated LVEF is 20 - 25%. Normal wall thickness. Mildly dilated left ventricle. Severely and globally reduced systolic function. Severe (grade 4) left ventricular diastolic dysfunction. · LA: Severely dilated left atrium. · RA: Severely dilated right atrium. · MV: Moderate mitral valve regurgitation is present. · TV: Moderate tricuspid valve regurgitation is present. · AO: Mild aortic root dilatation. · RV: Pacer/ICD present. · LVED 6.2cm    EKG (5/20/21) SB with 1degree AV block, QRS 116ms     [facility] (5/24/21) Native Coronaries: LM - moderate to severe distal disease 50%. % prox occluded (), heavily calcified, LCx: 80% proximal stenosis. OM1 is  (seen filling faintly via collaterals). OM2 99% stenosis. RCA: 100% proximal occluded.  LIMA to LAD: patent, supplies only a diagonal branch (probably D2).  The LAD distal to the bifurcation is 100% occluded () and fills via right to left collaterals off the SVG to RCA. SVG to R-PDA: patent with moderate diffuse irregularities but no obstructive disease in the graft.    No appealing interventional targets.      NST as above     ICD Interrogation (11/12/2021) [employer] VVI, thoracic impedence trending up, no events, normal device function and good battery  ICD interrogation (5/12/21) [employer] single lead, no events, normal device function and good battery     HEMODYNAMICS:  RHC 5/24/21 CI 1.97, PA 33/9/17, RA 1, PCWP 6- off milrinone   CPEST not done     Patient underwent a 6 minute walk test 11/12/2021    6 Min Walk Report 11/12/2021 7/6/2021 5/20/2021   (PRE) HR 88 98 74   (PRE) O2 Sat 100 - 99   (POST)  - 81   (POST) O2 Sat 99 98% on Ra 99   Distance in Meters 386.58 - 1158.24         OTHER IMAGING:  CXR (6/17/21) clear  CT 5/21/21 1. Irregular pulmonary nodule in the left upper lobe measuring 2 cm, suspicious for primary pulmonary malignancy. 2. Additional 6 mm left upper lobe pulmonary nodule. 3. Severe calcific atherosclerosis of the coronary arteries and abdominal aorta. No aneurysm. 4. Cardiomegaly. 5. No acute abnormality within the chest, abdomen, or pelvis. HISTORY OF PRESENT ILLNESS:  Briefly, Annette Liu is a 69 y.o. male with h/o HTN, HL, DM2, SRUTHI on CPAP, chronic systolic heart failure, stage D, NYHA class IV symptoms, non-ischemic cardiomyopathy, LVEF 20% with LVEDD 6.2, RV dysfunciton, TAPSE 1.22, TBili 1.5 likely from cardiac congestion, recent cardiac arrest s/p ICD (1/2013, [employer] followed by [facility]), coronary artery disease s/p multiple interventions s/p 4V CABG (8/2012), LHC (7/2019) severe stenosis of LIMA to LAD anastomosis site, SVG graft to OM is occluded, SVG graft to RCA 40-50%, LAD occluded proximally, severe proximal LCx, RCA is the long . PET (6/2019) EF 24% with anterior lateral and inferior reversible defect. Anemia, microcytic with iron deficiency, DVT with filter.     [facility] by Dr. Nazario Orellana for evaluation of his candidacy for advanced therapies.     Patient agreed to inpatient initiation of palliative infusion of chronic inotropes and evaluation for LVAD-DT. Patient was admitted 5/2-5/25/21.  Patient was started on milrinone infusion and had first part of LVAD evaluation completed. Right and left heart cath on 5/24/21 that showed severe diffuse native vessel CAD, with patent grafts (LIMA to D2, SVG to RCA).  Antiplatelet therapy was held during hospitalization and after discharge due to anticipated pulmonary nodule biopsy, bone marrow biopsy and EGD/C-Scope as part of LVAD workup.     Patient was readmitted 5/26-5/28/21 with hypertension, headache and chest pain, his troponin peaked at 10; he was shortly bridged with heparin, otherwise had normal EKG and chest CT negative for PE. Cardiology and AHF service was consulted and patient was discharged home after troponin came down.     Patient has now completed radiation treatment for adenocarcinoma of the lung. Hem-onc has cleared patient for VAD implant with 90% 2 year prognosis. He was most recently admitted for elective pre-op EGD and colonoscopy which he tolerated well; path negative for malignancy. He presents today for elective admission to [facility] for hemodynamic optimization prior to LVAD implant. REVIEW OF SYSTEMS:  Review of Systems   Constitutional: Negative for chills and fever. HENT: Negative. Eyes: Negative. Respiratory: Positive for shortness of breath. Negative for cough. Cardiovascular: Negative for chest pain, palpitations and claudication. Gastrointestinal: Positive for constipation. Negative for nausea and vomiting. Genitourinary: Negative. Musculoskeletal: Negative. Skin: Negative. Neurological: Negative for dizziness, focal weakness and weakness. Psychiatric/Behavioral: Negative. PHYSICAL EXAM:  Visit Vitals  /73   Pulse 75   Temp 98.1 °F (36.7 °C)   Resp 18   Wt 167 lb 5.3 oz (75.9 kg)   SpO2 98%   BMI 22.08 kg/m²     Physical Exam  Constitutional:       Appearance: He is ill-appearing. Comments: frail   HENT:      Head: Normocephalic and atraumatic.    Eyes:      Extraocular Movements: Extraocular movements intact. Conjunctiva/sclera: Conjunctivae normal.   Cardiovascular:      Rate and Rhythm: Normal rate and regular rhythm. Pulses: Normal pulses. Pulmonary:      Breath sounds: Examination of the left-lower field reveals decreased breath sounds. Decreased breath sounds present. Abdominal:      General: Abdomen is flat. Bowel sounds are normal.      Palpations: Abdomen is soft. Musculoskeletal:      Cervical back: Normal range of motion and neck supple. Right lower leg: No edema. Left lower leg: No edema. Skin:     General: Skin is warm and dry. Neurological:      General: No focal deficit present. Mental Status: He is alert and oriented to person, place, and time. Psychiatric:         Mood and Affect: Mood normal.          PAST MEDICAL HISTORY:  Past Medical History:   Diagnosis Date    CAD (coronary artery disease) '90 '97, '13 x 2    MI, code ice in 2013 at [facility]    Calculus of kidney     Colonic polyps     Congestive heart failure, unspecified     Diabetes ([facility] [address]     Gastritis     Hypercholesterolemia     Sleep apnea        PAST SURGICAL HISTORY:  Past Surgical History:   Procedure Laterality Date    COLONOSCOPY  04/06/2011    16, due 21    COLONOSCOPY N/A 3/2/2022    COLONOSCOPY    :- [facility] Grey Luu [facility] at [facility] ENDOSCOPY    ENDOSCOPY, COLON, DIAGNOSTIC      11, due 16    HX CORONARY ARTERY BYPASS GRAFT  8/22/12    x 4 vessel by MISSY Ramirez    HX HEENT      LASIK    HX PACEMAKER PLACEMENT  1/30/13    PA CARDIAC SURG PROCEDURE UNLIST  2012    x 4 vessels       FAMILY HISTORY:  Family History   Problem Relation Age of Onset    Heart Disease Mother         MI    Heart Disease Father         CAD & PVD    Lung Disease Father     Cancer Father         lung    Diabetes Maternal Grandmother     Heart Disease Other         CAD    Stroke Sister        SOCIAL HISTORY:  Social History     Socioeconomic History    Marital status:  Tobacco Use    Smoking status: Never Smoker    Smokeless tobacco: Never Used   Vaping Use    Vaping Use: Never used   Substance and Sexual Activity    Alcohol use: Yes     Alcohol/week: 0.0 standard drinks     Comment:  VERY RARE    Drug use: No       LABORATORY RESULTS:  Labs Latest Ref Rng & Units 4/4/2022 4/3/2022 4/3/2022 3/4/2022 3/3/2022 3/2/2022 3/1/2022   WBC 4.1 - 11.1 K/uL 3.5(L) - 3. 5(L) 2. 5(L) 2. 5(L) 2. 9(L) 3.0(L)   RBC 4.10 - 5.70 M/uL 4.79 - 4.85 4.47 4.57 4.95 5.04   Hemoglobin 12.1 - 17.0 g/dL 11. 9(L) - 12.1 10. 7(L) 10. 9(L) 11. 7(L) 11. 8(L)   Hematocrit 36.6 - 50.3 % 36.9 - 37.5 33. 7(L) 34. 7(L) 37.2 37.7   MCV 80.0 - 99.0 FL 77. 0(L) - 77. 3(L) 75. 4(L) 75. 9(L) 75. 2(L) 74. 8(L)   Platelets 321 - 676 K/uL 137(L) - 144(L) 120(L) 120(L) 140(L) 154   Lymphocytes 12 - 49 % - - - 27 26 26 18   Monocytes 5 - 13 % - - - 13 12 12 14(H)   Eosinophils 0 - 7 % - - - 6 3 3 2   Basophils 0 - 1 % - - - 1 0 1 1   Albumin 3.5 - 5.0 g/dL 3. 0(L) - 3. 4(L) 2. 8(L) 2. 8(L) 3.0(L) 3.6   Calcium 8.5 - 10.1 MG/DL 8.7 - 9.3 8.4(L) 8.8 9.0 9.4   Glucose 65 - 100 mg/dL 107(H) - 132(H) 112(H) 105(H) 94 134(H)   BUN 6 - 20 MG/DL 21(H) - 25(H) 14 11 16 21(H)   Creatinine 0.70 - 1.30 MG/DL 1.10 - 1.17 1.05 1.01 1.03 1.18   Sodium 136 - 145 mmol/L 134(L) - 132(L) 136 136 132(L) 132(L)   Potassium 3.5 - 5.1 mmol/L 4.1 3.6 3.7 3.5 3. 4(L) 3.6 3.6   TSH 0.36 - 3.74 uIU/mL - - - - - - 1.68   LDH 85 - 241 U/L - - - - - 145 -   Some recent data might be hidden       ALLERGY:  Allergies   Allergen Reactions    Oxycodone Anaphylaxis    Pcn [Penicillins] Hives        CURRENT MEDICATIONS:    Current Facility-Administered Medications:     albumin human 5% (BUMINATE) solution 12.5 g, 12.5 g, IntraVENous, Q12H, Nita Young NP, 12.5 g at 04/04/22 0939    dextrose 10 % infusion 0-250 mL, 0-250 mL, IntraVENous, PRN, Graeme Wong NP    insulin lispro (HUMALOG) injection, , SubCUTAneous, AC&HS, Graeme Wong NP    [START ON 4/5/2022] albumin human 5% (BUMINATE) solution 25 g, 25 g, IntraVENous, ONCE, Jf Najera MD    [START ON 4/5/2022] DOBUTamine (DOBUTREX) 1,000 mg/250 mL (4,000 mcg/mL) infusion, 0-10 mcg/kg/min, IntraVENous, TITRATE, Jf Najera MD    [START ON 4/5/2022] heparin (porcine) in 0.9% NaCl 30,000 unit/1,000 mL perfusion irrigation 50-1,000 mL, 50-1,000 mL, Other, PRN, Jose Palmer, MD Florina Smiles Arlena Kanner ON 4/5/2022] magnesium sulfate 2 g/50 ml IVPB (premix or compounded), 2 g, IntraVENous, ONCE, Jf Najera MD    [START ON 4/5/2022] nitroglycerin (Tridil) 200 mcg/ml infusion, 0-20 mcg/min, IntraVENous, TITRATE, Fiser, Marget Ply, MD Florina Smiles Arlena Kanner ON 4/5/2022] potassium chloride 20 mEq in 50 ml IVPB, 20 mEq, IntraVENous, ONCE, Fiser, Marget Ply, MD Florina Smiles Arlena Kanner ON 4/5/2022] aminocaproic acid (AMICAR) 15 g in 0.9% sodium chloride 150 mL infusion, 1 g/hr, IntraVENous, CONTINUOUS, Fiser, Marget Ply, MD Florina Smiles Arlena Kanner ON 4/5/2022] amiodarone (CORDARONE) 900 mg/250 ml D5W infusion, 0.5-1 mg/min, IntraVENous, TITRATE, Jf Najera MD    [START ON 4/5/2022] dexmedeTOMidine in 0.9 % NaCl (PRECEDEX) 400 mcg/100 mL (4 mcg/mL) infusion soln, 0.1-1.5 mcg/kg/hr, IntraVENous, TITRATE, Fiser, Marget Ply, MD Florina Smiles Arlena Kanner ON 4/5/2022] EPINEPHrine (ADRENALIN) 10 mg in 0.9% sodium chloride 250 mL infusion, 0-10 mcg/min, IntraVENous, TITRATE, Jf Najera MD    [START ON 4/5/2022] insulin regular (NOVOLIN R, HUMULIN R) 100 Units in 0.9% sodium chloride 100 mL infusion, 0-50 Units/hr, IntraVENous, TITRATE, Fiser, Marget Ply, MD Florina Smiles Arlena Kanner ON 4/5/2022] niCARdipine (CARDENE) 25 mg in 0.9% sodium chloride 250 mL infusion, 0-15 mg/hr, IntraVENous, TITRATE, Jf Najera MD    [START ON 4/5/2022] nitroglycerin 0.1 mg/mL in D5W injection,  mcg, IntraVENous, PRN, Jf Hugo MD Florina Smiles  [START ON 4/5/2022] NOREPINephrine (LEVOPHED) 32,000 mcg in dextrose 5% 250 mL (128 mcg/mL) infusion, 0.5-16 mcg/min, IntraVENous, TITRATE, Reinaldo Maikol Toussaint MD  [facility] ON 4/5/2022] PHENYLephrine (MAINOR-SYNEPHRINE) 20 mg in 0.9% sodium chloride 250 mL infusion,  mcg/min, IntraVENous, TITRATE, Maikol Najera MD  [facility]  [START ON 4/5/2022] PHENYLephrine (MAINOR-SYNEPHRINE) 100 mg in 0.9% sodium chloride 250 mL infusion,  mcg/min, IntraVENous, TITRATE, Maikol Najera MD  [facility] ON 4/5/2022] vancomycin (VANCOCIN) 1,000 mg in sodium chloride irrigation 0.9 % 500 mL irrigation, , Irrigation, ONCE, Maikol Najera MD  [facility] ON 4/5/2022] vasopressin (VASOSTRICT) 20 Units in 0.9% sodium chloride 100 mL infusion, 0-0.04 Units/min, IntraVENous, TITRATE, Maikol Najera MD  [facility] ON 4/5/2022] amiodarone (CORDARONE) 150 mg in dextrose 5% 100 mL bolus infusion, 150 mg, IntraVENous, ONCE, Maikol Najera MD    [START ON 4/5/2022] protamine injection 500 mg, 500 mg, IntraVENous, ONCE, Maikol Najera MD    aspirin delayed-release tablet 81 mg, 81 mg, Oral, DAILY, Jeffy Wong NP, 81 mg at 04/04/22 0905    hydrALAZINE (APRESOLINE) tablet 50 mg, 50 mg, Oral, TID, Jeffy Wong, NP, 50 mg at 04/04/22 1623    milrinone (PRIMACOR) 20 MG/100 ML D5W infusion, 0.375 mcg/kg/min, IntraVENous, CONTINUOUS, Roberto JACOBSON NP, Last Rate: 8.5 mL/hr at 04/04/22 1958, 0.375 mcg/kg/min at 04/04/22 1958    pantoprazole (PROTONIX) tablet 40 mg, 40 mg, Oral, ACB, Jeffy Wong NP, 40 mg at 04/04/22 0707    ranolazine ER (RANEXA) tablet 500 mg, 500 mg, Oral, BID, Polliard, Rebbecca Romansh T, NP, 500 mg at 04/04/22 1910    rosuvastatin (CRESTOR) tablet 10 mg, 10 mg, Oral, QHS, Polliard, Rebbecca Romansh T, NP, 10 mg at 04/03/22 2118    tamsulosin (FLOMAX) capsule 0.4 mg, 0.4 mg, Oral, DAILY, Polliard, Rebbecca Romansh T, NP, 0.4 mg at 04/04/22 0905    sodium chloride (NS) flush 5-40 mL, 5-40 mL, IntraVENous, Q8H, Millie Wong T, NP, 10 mL at 04/04/22 1434    sodium chloride (NS) flush 5-40 mL, 5-40 mL, IntraVENous, PRN, PolliardZuri, NP    0.9% sodium chloride infusion, 9 mL/hr, IntraVENous, CONTINUOUS, Geni Wong NP, Last Rate: 6 mL/hr at 04/03/22 1300, 6 mL/hr at 04/03/22 1300    acetaminophen (TYLENOL) tablet 650 mg, 650 mg, Oral, Q4H PRN, Geni Wong NP, 650 mg at 04/03/22 1919    mupirocin (BACTROBAN) 2 % ointment 1 g, 1 g, Both Nostrils, BID, Geni Wong NP    chlorhexidine (PERIDEX) 0.12 % mouthwash 15 mL, 15 mL, Oral, Q12H, Nash Wong NP, 15 mL at 04/04/22 0940    [START ON 4/5/2022] rifAMPin (RIFADIN) 600 mg in 0.9% sodium chloride 100 mL IVPB, 600 mg, IntraVENous, ONCE, Geni Wong NP    [START ON 4/5/2022] fluconazole (DIFLUCAN) 200mg/100 mL IVPB (premix), 200 mg, IntraVENous, ONCE, Geni Wong NP  [facility]  [START ON 4/5/2022] levoFLOXacin (LEVAQUIN) 500 mg in D5W IVPB, 500 mg, IntraVENous, ONCE, Nash Wong NP    senna-docusate (PERICOLACE) 8.6-50 mg per tablet 1 Tablet, 1 Tablet, Oral, DAILY, Geni Wong NP, 1 Tablet at 04/04/22 0906    polyethylene glycol (MIRALAX) packet 17 g, 17 g, Oral, DAILY, Nash Wong NP, 17 g at 04/04/22 0906    glucose chewable tablet 16 g, 4 Tablet, Oral, PRN, Geni Wong NP    dextrose 10 % infusion 0-250 mL, 0-250 mL, IntraVENous, PRN, Geni Wong NP    glucagon (GLUCAGEN) injection 1 mg, 1 mg, IntraMUSCular, PRN, Geni Wong NP      Thank you for your referral and allowing me to participate in this patient's care. Claribel Kam, EARNESTINE  [address] [facility] [place]  [address] [facility], [address] [facility] [address], [address]  Office [phone]  Fax [phone]            [facility] ATTENDING ADDENDUM    Patient was seen and examined in person. Data and notes were reviewed. I have discussed and agree with the plan as noted in the NP note above without further additions.     Ciarra Mendoza MD PhD  [facility]

## 2022-04-06 ENCOUNTER — APPOINTMENT (OUTPATIENT)
Dept: GENERAL RADIOLOGY | Age: 77
DRG: 001 | End: 2022-04-06
Attending: NURSE PRACTITIONER
Payer: MEDICARE

## 2022-04-06 LAB
ADMINISTERED INITIALS, ADMINIT: NORMAL
ALBUMIN SERPL-MCNC: 3.3 G/DL (ref 3.5–5)
ALBUMIN/GLOB SERPL: 1.2 {RATIO} (ref 1.1–2.2)
ALP SERPL-CCNC: 91 U/L (ref 45–117)
ALT SERPL-CCNC: 17 U/L (ref 12–78)
ANION GAP SERPL CALC-SCNC: 6 MMOL/L (ref 5–15)
APTT PPP: 29 SEC (ref 22.1–31)
APTT PPP: 31.5 SEC (ref 22.1–31)
APTT PPP: 48.1 SEC (ref 22.1–31)
APTT PPP: 70.5 SEC (ref 22.1–31)
AST SERPL-CCNC: 140 U/L (ref 15–37)
BASE DEFICIT BLD-SCNC: 1.4 MMOL/L
BASE DEFICIT BLD-SCNC: 2.4 MMOL/L
BDY SITE: ABNORMAL
BILIRUB SERPL-MCNC: 1.7 MG/DL (ref 0.2–1)
BLD PROD TYP BPU: NORMAL
BNP SERPL-MCNC: 5786 PG/ML
BPU ID: NORMAL
BUN SERPL-MCNC: 14 MG/DL (ref 6–20)
BUN/CREAT SERPL: 15 (ref 12–20)
CALCIUM SERPL-MCNC: 8.5 MG/DL (ref 8.5–10.1)
CHLORIDE SERPL-SCNC: 108 MMOL/L (ref 97–108)
CO2 SERPL-SCNC: 23 MMOL/L (ref 21–32)
CREAT SERPL-MCNC: 0.94 MG/DL (ref 0.7–1.3)
D50 ADMINISTERED, D50ADM: 0 ML
D50 ORDER, D50ORD: 0 ML
ERYTHROCYTE [DISTWIDTH] IN BLOOD BY AUTOMATED COUNT: 24.7 % (ref 11.5–14.5)
GAS FLOW.O2 O2 DELIVERY SYS: ABNORMAL L/MIN
GAS FLOW.O2 SETTING OXYMISER: 16 BPM
GLOBULIN SER CALC-MCNC: 2.7 G/DL (ref 2–4)
GLSCOM COMMENTS: NORMAL
GLUCOSE BLD STRIP.AUTO-MCNC: 101 MG/DL (ref 65–117)
GLUCOSE BLD STRIP.AUTO-MCNC: 102 MG/DL (ref 65–117)
GLUCOSE BLD STRIP.AUTO-MCNC: 104 MG/DL (ref 65–117)
GLUCOSE BLD STRIP.AUTO-MCNC: 105 MG/DL (ref 65–117)
GLUCOSE BLD STRIP.AUTO-MCNC: 108 MG/DL (ref 65–117)
GLUCOSE BLD STRIP.AUTO-MCNC: 110 MG/DL (ref 65–117)
GLUCOSE BLD STRIP.AUTO-MCNC: 112 MG/DL (ref 65–117)
GLUCOSE BLD STRIP.AUTO-MCNC: 113 MG/DL (ref 65–117)
GLUCOSE BLD STRIP.AUTO-MCNC: 115 MG/DL (ref 65–117)
GLUCOSE BLD STRIP.AUTO-MCNC: 116 MG/DL (ref 65–117)
GLUCOSE BLD STRIP.AUTO-MCNC: 119 MG/DL (ref 65–117)
GLUCOSE BLD STRIP.AUTO-MCNC: 122 MG/DL (ref 65–117)
GLUCOSE BLD STRIP.AUTO-MCNC: 124 MG/DL (ref 65–117)
GLUCOSE BLD STRIP.AUTO-MCNC: 125 MG/DL (ref 65–117)
GLUCOSE BLD STRIP.AUTO-MCNC: 130 MG/DL (ref 65–117)
GLUCOSE BLD STRIP.AUTO-MCNC: 138 MG/DL (ref 65–117)
GLUCOSE BLD STRIP.AUTO-MCNC: 88 MG/DL (ref 65–117)
GLUCOSE BLD STRIP.AUTO-MCNC: 94 MG/DL (ref 65–117)
GLUCOSE SERPL-MCNC: 115 MG/DL (ref 65–100)
GLUCOSE, GLC: 101 MG/DL
GLUCOSE, GLC: 102 MG/DL
GLUCOSE, GLC: 104 MG/DL
GLUCOSE, GLC: 105 MG/DL
GLUCOSE, GLC: 108 MG/DL
GLUCOSE, GLC: 110 MG/DL
GLUCOSE, GLC: 112 MG/DL
GLUCOSE, GLC: 113 MG/DL
GLUCOSE, GLC: 115 MG/DL
GLUCOSE, GLC: 116 MG/DL
GLUCOSE, GLC: 119 MG/DL
GLUCOSE, GLC: 122 MG/DL
GLUCOSE, GLC: 124 MG/DL
GLUCOSE, GLC: 125 MG/DL
GLUCOSE, GLC: 130 MG/DL
GLUCOSE, GLC: 138 MG/DL
GLUCOSE, GLC: 88 MG/DL
GLUCOSE, GLC: 94 MG/DL
HCO3 BLD-SCNC: 21.2 MMOL/L (ref 22–26)
HCO3 BLD-SCNC: 22.8 MMOL/L (ref 22–26)
HCT VFR BLD AUTO: 25 % (ref 36.6–50.3)
HGB BLD-MCNC: 8 G/DL (ref 12.1–17)
HIGH TARGET, HITG: 130 MG/DL
INR PPP: 1.1 (ref 0.9–1.1)
INSULIN ADMINSTERED, INSADM: 0.6 UNITS/HOUR
INSULIN ADMINSTERED, INSADM: 0.8 UNITS/HOUR
INSULIN ADMINSTERED, INSADM: 0.8 UNITS/HOUR
INSULIN ADMINSTERED, INSADM: 0.9 UNITS/HOUR
INSULIN ADMINSTERED, INSADM: 0.9 UNITS/HOUR
INSULIN ADMINSTERED, INSADM: 1 UNITS/HOUR
INSULIN ADMINSTERED, INSADM: 1.1 UNITS/HOUR
INSULIN ADMINSTERED, INSADM: 1.2 UNITS/HOUR
INSULIN ADMINSTERED, INSADM: 1.2 UNITS/HOUR
INSULIN ADMINSTERED, INSADM: 1.3 UNITS/HOUR
INSULIN ADMINSTERED, INSADM: 1.3 UNITS/HOUR
INSULIN ADMINSTERED, INSADM: 1.4 UNITS/HOUR
INSULIN ADMINSTERED, INSADM: 1.6 UNITS/HOUR
INSULIN ORDER, INSORD: 0.6 UNITS/HOUR
INSULIN ORDER, INSORD: 0.8 UNITS/HOUR
INSULIN ORDER, INSORD: 0.8 UNITS/HOUR
INSULIN ORDER, INSORD: 0.9 UNITS/HOUR
INSULIN ORDER, INSORD: 0.9 UNITS/HOUR
INSULIN ORDER, INSORD: 1 UNITS/HOUR
INSULIN ORDER, INSORD: 1.1 UNITS/HOUR
INSULIN ORDER, INSORD: 1.2 UNITS/HOUR
INSULIN ORDER, INSORD: 1.2 UNITS/HOUR
INSULIN ORDER, INSORD: 1.3 UNITS/HOUR
INSULIN ORDER, INSORD: 1.3 UNITS/HOUR
INSULIN ORDER, INSORD: 1.4 UNITS/HOUR
INSULIN ORDER, INSORD: 1.6 UNITS/HOUR
LACTATE SERPL-SCNC: 1.4 MMOL/L (ref 0.4–2)
LDH SERPL L TO P-CCNC: 308 U/L (ref 85–241)
LOW TARGET, LOT: 95 MG/DL
MAGNESIUM SERPL-MCNC: 2 MG/DL (ref 1.6–2.4)
MCH RBC QN AUTO: 24.8 PG (ref 26–34)
MCHC RBC AUTO-ENTMCNC: 32 G/DL (ref 30–36.5)
MCV RBC AUTO: 77.6 FL (ref 80–99)
MINUTES UNTIL NEXT BG, NBG: 120 MIN
MINUTES UNTIL NEXT BG, NBG: 60 MIN
MULTIPLIER, MUL: 0.02
MULTIPLIER, MUL: 0.03
NRBC # BLD: 0 K/UL (ref 0–0.01)
NRBC BLD-RTO: 0 PER 100 WBC
O2/TOTAL GAS SETTING VFR VENT: 80 %
ORDER INITIALS, ORDINIT: NORMAL
PCO2 BLD: 30.9 MMHG (ref 35–45)
PCO2 BLD: 35 MMHG (ref 35–45)
PEEP RESPIRATORY: 10 CMH2O
PH BLD: 7.42 [PH] (ref 7.35–7.45)
PH BLD: 7.45 [PH] (ref 7.35–7.45)
PLATELET # BLD AUTO: 149 K/UL (ref 150–400)
PMV BLD AUTO: 11.2 FL (ref 8.9–12.9)
PO2 BLD: 143 MMHG (ref 80–100)
PO2 BLD: 408 MMHG (ref 80–100)
POTASSIUM SERPL-SCNC: 4.1 MMOL/L (ref 3.5–5.1)
PROT SERPL-MCNC: 6 G/DL (ref 6.4–8.2)
PROTHROMBIN TIME: 11.9 SEC (ref 9–11.1)
RBC # BLD AUTO: 3.22 M/UL (ref 4.1–5.7)
SAO2 % BLD: 100 % (ref 92–97)
SAO2 % BLD: 99.3 % (ref 92–97)
SERVICE CMNT-IMP: ABNORMAL
SERVICE CMNT-IMP: NORMAL
SODIUM SERPL-SCNC: 137 MMOL/L (ref 136–145)
SPECIMEN TYPE: ABNORMAL
SPECIMEN TYPE: ABNORMAL
STATUS OF UNIT,%ST: NORMAL
THERAPEUTIC RANGE,PTTT: ABNORMAL SECS (ref 58–77)
THERAPEUTIC RANGE,PTTT: NORMAL SECS (ref 58–77)
UNIT DIVISION, %UDIV: 0
VENTILATION MODE VENT: ABNORMAL
VT SETTING VENT: 520 ML
WBC # BLD AUTO: 5.9 K/UL (ref 4.1–11.1)

## 2022-04-06 PROCEDURE — 85730 THROMBOPLASTIN TIME PARTIAL: CPT

## 2022-04-06 PROCEDURE — 99233 SBSQ HOSP IP/OBS HIGH 50: CPT | Performed by: INTERNAL MEDICINE

## 2022-04-06 PROCEDURE — 74011250637 HC RX REV CODE- 250/637: Performed by: NURSE PRACTITIONER

## 2022-04-06 PROCEDURE — 82803 BLOOD GASES ANY COMBINATION: CPT

## 2022-04-06 PROCEDURE — 74011250636 HC RX REV CODE- 250/636: Performed by: NURSE PRACTITIONER

## 2022-04-06 PROCEDURE — 36600 WITHDRAWAL OF ARTERIAL BLOOD: CPT

## 2022-04-06 PROCEDURE — 80053 COMPREHEN METABOLIC PANEL: CPT

## 2022-04-06 PROCEDURE — 85027 COMPLETE CBC AUTOMATED: CPT

## 2022-04-06 PROCEDURE — 82962 GLUCOSE BLOOD TEST: CPT

## 2022-04-06 PROCEDURE — 99291 CRITICAL CARE FIRST HOUR: CPT | Performed by: THORACIC SURGERY (CARDIOTHORACIC VASCULAR SURGERY)

## 2022-04-06 PROCEDURE — 71045 X-RAY EXAM CHEST 1 VIEW: CPT

## 2022-04-06 PROCEDURE — 99231 SBSQ HOSP IP/OBS SF/LOW 25: CPT | Performed by: CLINICAL NURSE SPECIALIST

## 2022-04-06 PROCEDURE — 74011250636 HC RX REV CODE- 250/636: Performed by: THORACIC SURGERY (CARDIOTHORACIC VASCULAR SURGERY)

## 2022-04-06 PROCEDURE — 74011000258 HC RX REV CODE- 258: Performed by: THORACIC SURGERY (CARDIOTHORACIC VASCULAR SURGERY)

## 2022-04-06 PROCEDURE — 73610000026 HC INO THERAPY EACH HOUR

## 2022-04-06 PROCEDURE — 74011000250 HC RX REV CODE- 250: Performed by: THORACIC SURGERY (CARDIOTHORACIC VASCULAR SURGERY)

## 2022-04-06 PROCEDURE — 83735 ASSAY OF MAGNESIUM: CPT

## 2022-04-06 PROCEDURE — 82375 ASSAY CARBOXYHB QUANT: CPT

## 2022-04-06 PROCEDURE — 65610000003 HC RM ICU SURGICAL

## 2022-04-06 PROCEDURE — 93750 INTERROGATION VAD IN PERSON: CPT | Performed by: THORACIC SURGERY (CARDIOTHORACIC VASCULAR SURGERY)

## 2022-04-06 PROCEDURE — P9045 ALBUMIN (HUMAN), 5%, 250 ML: HCPCS | Performed by: NURSE PRACTITIONER

## 2022-04-06 PROCEDURE — 74011000258 HC RX REV CODE- 258: Performed by: NURSE PRACTITIONER

## 2022-04-06 PROCEDURE — 93005 ELECTROCARDIOGRAM TRACING: CPT

## 2022-04-06 PROCEDURE — 93750 INTERROGATION VAD IN PERSON: CPT | Performed by: INTERNAL MEDICINE

## 2022-04-06 PROCEDURE — 83615 LACTATE (LD) (LDH) ENZYME: CPT

## 2022-04-06 PROCEDURE — 77030037442 HC TY LVAD MGMT SYST CMP-B

## 2022-04-06 PROCEDURE — 74011000250 HC RX REV CODE- 250: Performed by: NURSE PRACTITIONER

## 2022-04-06 PROCEDURE — 36415 COLL VENOUS BLD VENIPUNCTURE: CPT

## 2022-04-06 PROCEDURE — 85610 PROTHROMBIN TIME: CPT

## 2022-04-06 PROCEDURE — 99292 CRITICAL CARE ADDL 30 MIN: CPT | Performed by: THORACIC SURGERY (CARDIOTHORACIC VASCULAR SURGERY)

## 2022-04-06 PROCEDURE — 94003 VENT MGMT INPAT SUBQ DAY: CPT

## 2022-04-06 PROCEDURE — 83880 ASSAY OF NATRIURETIC PEPTIDE: CPT

## 2022-04-06 PROCEDURE — 83605 ASSAY OF LACTIC ACID: CPT

## 2022-04-06 RX ORDER — HEPARIN SODIUM 1000 [USP'U]/ML
2000 INJECTION, SOLUTION INTRAVENOUS; SUBCUTANEOUS ONCE
Status: COMPLETED | OUTPATIENT
Start: 2022-04-06 | End: 2022-04-06

## 2022-04-06 RX ORDER — ALBUMIN HUMAN 50 G/1000ML
25 SOLUTION INTRAVENOUS EVERY 12 HOURS
Status: DISCONTINUED | OUTPATIENT
Start: 2022-04-06 | End: 2022-04-08

## 2022-04-06 RX ADMIN — FENTANYL CITRATE 25 MCG: 0.05 INJECTION, SOLUTION INTRAMUSCULAR; INTRAVENOUS at 18:40

## 2022-04-06 RX ADMIN — SENNOSIDES AND DOCUSATE SODIUM 1 TABLET: 50; 8.6 TABLET ORAL at 09:28

## 2022-04-06 RX ADMIN — FAMOTIDINE 20 MG: 10 INJECTION INTRAVENOUS at 20:23

## 2022-04-06 RX ADMIN — LEVOFLOXACIN 250 MG: 5 INJECTION, SOLUTION INTRAVENOUS at 05:05

## 2022-04-06 RX ADMIN — FLUCONAZOLE 200 MG: 2 INJECTION, SOLUTION INTRAVENOUS at 04:28

## 2022-04-06 RX ADMIN — POLYETHYLENE GLYCOL 3350 17 G: 17 POWDER, FOR SOLUTION ORAL at 09:27

## 2022-04-06 RX ADMIN — DEXMEDETOMIDINE HYDROCHLORIDE 1 MCG/KG/HR: 4 INJECTION, SOLUTION INTRAVENOUS at 10:39

## 2022-04-06 RX ADMIN — SUCRALFATE 1 G: 1 TABLET ORAL at 06:33

## 2022-04-06 RX ADMIN — NICARDIPINE HYDROCHLORIDE 5 MG/HR: 25 INJECTION, SOLUTION INTRAVENOUS at 02:31

## 2022-04-06 RX ADMIN — AMIODARONE HYDROCHLORIDE 1 MG/MIN: 50 INJECTION, SOLUTION INTRAVENOUS at 09:37

## 2022-04-06 RX ADMIN — ACETAMINOPHEN 650 MG: 325 TABLET ORAL at 02:05

## 2022-04-06 RX ADMIN — HEPARIN SODIUM 2000 UNITS: 1000 INJECTION INTRAVENOUS; SUBCUTANEOUS at 14:50

## 2022-04-06 RX ADMIN — ONDANSETRON HYDROCHLORIDE 4 MG: 2 SOLUTION INTRAMUSCULAR; INTRAVENOUS at 17:31

## 2022-04-06 RX ADMIN — DEXMEDETOMIDINE HYDROCHLORIDE 1 MCG/KG/HR: 4 INJECTION, SOLUTION INTRAVENOUS at 01:17

## 2022-04-06 RX ADMIN — SODIUM CHLORIDE, PRESERVATIVE FREE 10 ML: 5 INJECTION INTRAVENOUS at 05:05

## 2022-04-06 RX ADMIN — SODIUM CHLORIDE 9 ML/HR: 9 INJECTION, SOLUTION INTRAVENOUS at 02:53

## 2022-04-06 RX ADMIN — NICARDIPINE HYDROCHLORIDE 5 MG/HR: 25 INJECTION, SOLUTION INTRAVENOUS at 11:55

## 2022-04-06 RX ADMIN — CHLORHEXIDINE GLUCONATE 10 ML: 1.2 RINSE ORAL at 19:03

## 2022-04-06 RX ADMIN — SODIUM CHLORIDE, PRESERVATIVE FREE 10 ML: 5 INJECTION INTRAVENOUS at 21:00

## 2022-04-06 RX ADMIN — CHLORHEXIDINE GLUCONATE 10 ML: 1.2 RINSE ORAL at 09:30

## 2022-04-06 RX ADMIN — FAMOTIDINE 20 MG: 10 INJECTION INTRAVENOUS at 09:27

## 2022-04-06 RX ADMIN — ALBUMIN (HUMAN) 25 G: 12.5 INJECTION, SOLUTION INTRAVENOUS at 16:31

## 2022-04-06 RX ADMIN — Medication 12 UNITS/KG/HR: at 06:16

## 2022-04-06 RX ADMIN — FENTANYL CITRATE 25 MCG: 0.05 INJECTION, SOLUTION INTRAMUSCULAR; INTRAVENOUS at 22:13

## 2022-04-06 RX ADMIN — FENTANYL CITRATE 25 MCG: 0.05 INJECTION, SOLUTION INTRAMUSCULAR; INTRAVENOUS at 02:28

## 2022-04-06 RX ADMIN — SODIUM CHLORIDE 600 MG: 9 INJECTION, SOLUTION INTRAVENOUS at 03:41

## 2022-04-06 RX ADMIN — FENTANYL CITRATE 25 MCG: 0.05 INJECTION, SOLUTION INTRAMUSCULAR; INTRAVENOUS at 14:50

## 2022-04-06 RX ADMIN — VANCOMYCIN HYDROCHLORIDE 1250 MG: 10 INJECTION, POWDER, LYOPHILIZED, FOR SOLUTION INTRAVENOUS at 09:38

## 2022-04-06 RX ADMIN — FENTANYL CITRATE 25 MCG: 0.05 INJECTION, SOLUTION INTRAMUSCULAR; INTRAVENOUS at 20:23

## 2022-04-06 RX ADMIN — SUCRALFATE 1 G: 1 TABLET ORAL at 21:00

## 2022-04-06 RX ADMIN — DEXMEDETOMIDINE HYDROCHLORIDE 1 MCG/KG/HR: 4 INJECTION, SOLUTION INTRAVENOUS at 07:36

## 2022-04-06 RX ADMIN — ALBUMIN (HUMAN) 25 G: 12.5 INJECTION, SOLUTION INTRAVENOUS at 10:28

## 2022-04-06 RX ADMIN — DOBUTAMINE HYDROCHLORIDE 7 MCG/KG/MIN: 400 INJECTION INTRAVENOUS at 15:41

## 2022-04-06 RX ADMIN — HEPARIN SODIUM 400 UNITS/HR: 1000 INJECTION, SOLUTION INTRAVENOUS; SUBCUTANEOUS at 00:13

## 2022-04-06 RX ADMIN — MUPIROCIN: 20 OINTMENT TOPICAL at 19:03

## 2022-04-06 RX ADMIN — SODIUM CHLORIDE 10 ML/HR: 4.5 INJECTION, SOLUTION INTRAVENOUS at 02:53

## 2022-04-06 RX ADMIN — MUPIROCIN: 20 OINTMENT TOPICAL at 09:30

## 2022-04-06 RX ADMIN — PROPOFOL 25 MCG/KG/MIN: 10 INJECTION, EMULSION INTRAVENOUS at 07:00

## 2022-04-06 RX ADMIN — SUCRALFATE 1 G: 1 TABLET ORAL at 13:00

## 2022-04-06 NOTE — PROGRESS NOTES
Problem: Falls - Risk of  Goal: *Absence of Falls  Description: Document Monik Herrera Fall Risk and appropriate interventions in the flowsheet. Outcome: Progressing Towards Goal  Note: Fall Risk Interventions:  Mobility Interventions: Communicate number of staff needed for ambulation/transfer         Medication Interventions: Evaluate medications/consider consulting pharmacy    Elimination Interventions: Toileting schedule/hourly rounds              Problem: Non-Violent Restraints  Goal: Removal from restraints as soon as assessed to be safe  Outcome: Progressing Towards Goal  Goal: No harm/injury to patient while restraints in use  Outcome: Progressing Towards Goal  Goal: Patient's dignity will be maintained  Outcome: Progressing Towards Goal  Goal: Patient Interventions  Outcome: Progressing Towards Goal     Problem: Ventilator Management  Goal: *Adequate oxygenation and ventilation  Outcome: Progressing Towards Goal  Goal: *Patient maintains clear airway/free of aspiration  Outcome: Progressing Towards Goal  Goal: *Absence of infection signs and symptoms  Outcome: Progressing Towards Goal  Goal: *Normal spontaneous ventilation  Outcome: Progressing Towards Goal     Problem: Patient Education: Go to Patient Education Activity  Goal: Patient/Family Education  Outcome: Progressing Towards Goal     Problem: Pressure Injury - Risk of  Goal: *Prevention of pressure injury  Description: Document Jl Scale and appropriate interventions in the flowsheet.   Outcome: Progressing Towards Goal  Note: Pressure Injury Interventions:  Sensory Interventions: Assess changes in LOC,Assess need for specialty bed         Activity Interventions: Increase time out of bed,Assess need for specialty bed,PT/OT evaluation    Mobility Interventions: Assess need for specialty bed,PT/OT evaluation    Nutrition Interventions: Document food/fluid/supplement intake,Discuss nutritional consult with provider    Friction and Shear Interventions: Feet elevated on foot rest,Foam dressings/transparent film/skin sealants,HOB 30 degrees or less                Problem: Patient Education: Go to Patient Education Activity  Goal: Patient/Family Education  Outcome: Progressing Towards Goal

## 2022-04-06 NOTE — PROGRESS NOTES
Chart reviewed, patient POD1 LVAD, RN reported to hold until tomorrow. Patient received sedated and on vent. Per ABCDEF protocol, will work with patient when PEEP is 10.0 or less, FIO2 60% or less, and patient is following basic commands. Will follow patient peripherally. Recommend nursing to complete with patient, as able and per protocol, in order to promote cardiopulmonary systems, maintain strength, endurance and independence:   -bed in chair position or maximize full reverse Trendelenburg position to facilitate upright activity with foot board and non-skid footwear on 3x/day ~30-60 mins each   -ROM during bathing B UEs and LEs  -positioning to prevent contractures and edema  RASS -1/0/+1 (during SAT)  Active ROM   Sitting (bed in chair position)   Standing (reverse Trendelenburg)   ADLs   RASS -3/2  Passive ROM   Sit (bed in chair position)   RASS -5/-4  Passive ROM       Thank you for your assistance.      Mary Krishnan MS, OTR/L

## 2022-04-06 NOTE — OP NOTES
[address] [facility]  OPERATIVE REPORT    Name:  Juventino Camacho  MR#:  [id number]  :  1945  ACCOUNT #:  [de-identified]  DATE OF SERVICE:  2022    PREOPERATIVE DIAGNOSIS:  New York Heart Association stage D heart failure. POSTOPERATIVE DIAGNOSIS:  New York Heart Association stage D heart failure. PROCEDURES PERFORMED:  1. Insertion of ventricular assist device, implantable, intracorporeal, single ventricle (HeartMate 3 left ventricular assist device, CPT code 42306). 2.  Aortic valve valvuloplasty consisting of a Park's stitch for moderate aortic regurgitation; CPT code 84602.  3.  Redo median sternotomy following previous coronary artery bypass grafting procedure (CPT code 43986). SURGEON:  Grady Noland MD    ASSISTANT: YOLI Huang    ANESTHESIA:  General endotracheal anesthesia. COMPLICATIONS:  None. SPECIMENS REMOVED:  Apical core. IMPLANTS:  HeartMate 3 left ventricular assist device. ESTIMATED BLOOD LOSS:  500 mL. INDICATION:  The patient is a pleasant 43-year-old gentleman who has been diagnosed with stage D heart failure. He is now being brought to the operating room to have a durable left ventricular assist device placed. PROCEDURE:  The patient was brought to the operating room, had a right radial A-line placed without complications, Clayton-Senthil catheter was placed without complications, underwent general endotracheal anesthesia without complications. Chest was prepped and draped in the usual fashion. A right groin incision was made. We eventually dissected down to the level of the right common femoral artery and right common femoral vein and eventually cannulated with a 21-Monegasque arterial perfuser and 25-Monegasque venous cannula. This was hooked up to the cardiopulmonary bypass circuit. We made a midline incision along the previous sternotomy site using an oscillating saw.   After removing the wires, we took down dense adhesions on both sides and freed up the left ventricle and the aorta. We then went on cardiopulmonary bypass, placed an antegrade cardioplegia needle, de-aired the cardioplegia line, hooked up, pumped the flow down, placed the cross-clamp, pumped the flow back up again. We gave 750 mL of initial cardioplegia. We then made a small aortotomy and performed a valvuloplasty consisting of Park's stitch for moderate aortic regurgitation. We then used a double running 4-0 Prolene suture, closed the aorta and glued it. We then tilted the heart up, cored out the apex, placed 2-0 Ti-Cron sutures with large pledgets circumferentially around the cored out apex, brought it through the sewing ring and tied it down. We then used a double running 4-0 Prolene suture to tie the rest of the ring in. We then hooked up the pump, de-aired it, started at 3000, extended the outflow graft to the level of the ascending aorta. I brought the pump flow down, placed a side biter clamp, opened the aorta with a 75 blade and further extended with forward and reverse Wagner. We then performed outflow graft to aortic anastomosis using 5-0 Prolene suture. We then glued it, de-aired it, brought all the clamps off, slowly came off bypass while coming up to speed on the left ventricular assist device. V wires were placed as well as Yuri drains. Wires were used to close the sternum, and Vicryl suture was used to close the subcutaneous tissue.   I was present for the entire procedure.; ; PA-C assistance was needed due to difficulty of procedure, dissection, and identification of pertinent anatomy throughout the case         MD TEODORO [email or web address]  D:  04/06/2022 14:49  T:  04/06/2022 18:21  JOB #:  [id number]

## 2022-04-06 NOTE — PROGRESS NOTES
: Bedside and Verbal shift change report given to Jad Thomas RN (oncoming nurse) by Clementina Thakur RN (offgoing nurse). Report included the following information SBAR, Intake/Output, MAR, Recent Results and Cardiac Rhythm NSR. Gtts: Epi 2; Dobut 8; Cardene 5; Amio 1; Dex 1.2; Nitric 30    : Sedation paused to assess Neuro status     : Pt awake-follows commands and MALDONADO; Precedex restarted     : Pt awake on 1.2 mcg Precedex- follows commands and MALDONADO; Precedex increased to 1.5 mcg     : RN asked Pt if he is in pain; Pt nodding head that he is in pain; Fentanyl given     : Pt awake on 1.5 mcg Precedex; Propofol started at 25 mcg/kg/min     : PTT drawn before heparin gtt started;  PTT  29     0013: CT output <50 for more than 2 consecutive hours; heparin gtt started per MD orders     0016: Sedation paused; Pt awake- follows commands and MALDONADO; Sedation restarted     0053: PA 24/13; CVP 8; RT at bedside; Loren weaned to 25 ppm from 30 ppm per MD orders     0200: PA 24/15: CVP 7-8; RT at bedside; Loren weaned to 20 ppm    0205: Temp 100.5; Tylenol given     0210: Sedation paused; Pt awake-follows commands and MALDONADO; sedation restarted     0315: PA 24/14; CVP 7-8; RT at bedside; Loren weaned to 15 ppm    0400: Sedation paused; Pt awake- follows commands and MALDONADO; sedation restarted     0428: AB.42/ 35/ 408.4/ 22.8/ 100%            Dr. Kelly Hough notified; orders received to have RT come down to 40% Fio2 and PEEP 8    0535: PA 24/12; CVP 7-8; RT at bedside; Loren weaned to 10 ppm    0625: Sedation paused; Pt awake-follows commands and MALDONADO; sedation restarted     0650: PA 26/13; CVP 7-8; RT at bedside; Loren weaned to 5 ppm    0655: Intensivist at bedside; orders received to have RT wean to PEEP 5    0740: Dr. [employer] [employer] at bedside; updated by RN    0745: Bedside and Verbal shift change report given to [facility], RN (oncoming nurse) by Jad Thomas RN (offgoing nurse).  Report included the following information SBAR, Intake/Output, MAR, Recent Results and Cardiac Rhythm NSR w/ PVCs.

## 2022-04-06 NOTE — PROGRESS NOTES
Critical Care Note     Cardiac surgery was called for the evaluation and management of: NYHA class IV A/C systolic heart failure, inotrope dependence, right ventricular dysfunction     The critical nature of the patient's condition includes the following: The patient is at imminent risk of death from NYHA class IV A/C systolic heart failure, inotrope dependence, and right ventricular dysfunction. He is at imminent risk of needing escalation of MCS and right sided MCS    Critical care diagnoses that are being treated:  NYHA class IV A/C systolic heart failure / inotrope dependence  Right ventricular dysfunction     HPI: Patient is a 70 yo with severe NYHA class IV A/C systolic heart failure, inotrope dependence, and right ventricular dysfunction. The patient is at imminent risk of death from NYHA class IV A/C systolic heart failure, inotrope dependence, and right ventricular dysfunction. He is at imminent risk of needing escalation of MCS and right sided MCS.   Asked to evaluate for permanent LVAD    NYHA class IV A/C systolic heart failure / Cardiogenic shock   Remains intubated and sedated  Remains on dual inotropic support (Dobutamine and Milrinone)  NT pro-BNP 6K  Pulmonary edema on CXR  CVP 10-15, PA 40/20's  Cardiac index 2's  's  OK to wean inhaled NO    Addendum:  Inhaled NO weaned off  CVP 10-15  PA 40/20's  OK to extubate    Addendum:  On SAT/SBT  CVP 10-15  PA 40/20's    Addendum:  Extubated  Will try to sit him up on side of bed    Addendum:  Some issues with low flows  Giving some volume  Controlling BP      Right ventricular dysfunction   Moderate RV dysfunction  Remains on inotropes              Intake/Output Summary (Last 24 hours) at 4/6/2022 1338  Last data filed at 4/6/2022 1300  Gross per 24 hour   Intake 5482.25 ml   Output 2399 ml   Net 3083.25 ml      Visit Vitals  BP (!) 89/61   Pulse 79   Temp 99.9 °F (37.7 °C)   Resp 16   Wt 188 lb 15 oz (85.7 kg)   SpO2 100%   BMI 24.93 kg/m² CXR Results  (Last 48 hours)               04/06/22 0429  XR CHEST PORT Final result    Impression:  1. No significant change in the appearance of the chest or support hardware. Narrative:  INDICATION: . HF   COMPARISON: Previous chest xray, yesterday. LIMITATIONS: Portable technique. Eduardo Guzmán FINDINGS: Single frontal view of the chest.    .   Lines/tubes/surgical: No significant change in the appearance of the drainage   approach Erie-Senthil catheter, ET tube, gastric tube, mediastinal/chest drains and   LVAD. Heart/mediastinum: Cardiac assist device is likely unchanged. Lungs/pleura: No focal consolidation. No visualized pleural effusion or   pneumothorax. Additional Comments: None. Eduardo Guzmán 04/05/22 1523  XR CHEST PORT Final result    Impression:  Support devices in place. No acute changes. Narrative:  Clinical indication: Shortness of breath. Portable AP semiupright view of the chest obtained and compared to April 3. Erie-Senthil catheter is in the outflow tract, ET tube and NG tube are in place,   chest tubes in place. Lung fields are clear, heart size is normal. Assisted   device in place. Next                   LVAD   Pump Speed (RPM): 4600  Pump Flow (LPM): 3.2  PI (Pulsitility Index): 6.2  Power: 2.9   Test: Yes  Back Up  at Bedside & Labeled: Yes  Power Module Test: Yes  Driveline Site Care: No  Driveline Dressing: Clean, Dry, and Intact  Testing  Alarms Reviewed: Yes  Back up SC speed: 4600  Back up Low Speed Limit: 4000  Emergency Equipment with Patient?: Yes  Emergency procedures reviewed?: Yes  Drive line site inspected?: No  Drive line intergrity inspected?: Yes  Drive line dressing changed?: No      Total critical care time - 195 minutes (CPT 43566, 99292 x 5)      I personally spent the above critical care time.   This is time spent at this critically ill patient's bedside / unit / floor actively involved in patient care as well as the coordination of care. This does not include any procedural time which has been billed separately. This does not include any NP/PA patient care time. I had a face to face encounter with the patient, reviewed and interpreted patient data including clinical events, labs, images, vital signs, I/O's, and examined patient. I have discussed the case and the plan and management of the patient's care with the consulting services and bedside nurses. This patient has a high probability of imminent, clinically significant deterioration, which requires the highest level of preparedness to intervene urgently. I participated in the decision-making and personally managed or directed the management of life and organ supporting interventions to treat or prevent imminent deterioration. This patient has a critical illness or injury that is acutely impairing one or more vital organ systems such that there is a high probability of imminent or life threatening deterioration in the patient's condition. This patient requires high complexity medical decision making to assess, manipulate, and support vital organ system failure. After stabilization, this patient still requires management to prevent further life / organ threatening deterioration.

## 2022-04-06 NOTE — PROGRESS NOTES
Physical Therapy - defer    Chart reviewed, patient POD1 LVAD, RN reported to hold until tomorrow.      Patient received sedated and on vent. Per ABCDEF protocol, will work with patient when PEEP is 10.0 or less, FIO2 60% or less, and patient is following basic commands.  Will follow patient peripherally.      Recommend nursing to complete with patient, as able and per protocol, in order to promote cardiopulmonary systems, maintain strength, endurance and independence:   -bed in chair position or maximize full reverse Trendelenburg position to facilitate upright activity with foot board and non-skid footwear on 3x/day ~30-60 mins each   -ROM during bathing B UEs and LEs  -positioning to prevent contractures and edema  RASS -1/0/+1 (during SAT) · Active ROM  · Sitting (bed in chair position)  · Standing (reverse Trendelenburg)  · ADLs   RASS -3/2 · Passive ROM  · Sit (bed in chair position)   RASS -5/-4 · Passive ROM         Thank you for your assistance.

## 2022-04-06 NOTE — PROGRESS NOTES
[address] [facility] in [address] by to see patient to label all acute equipment. Patient intubated and sedated. RN at bedside. All acute equipment labeled and entered into [employer] connect. Checked battery which OR reported would not hold charge. Noted one battery in  with red light indicator next to [facility] pocket. Switched battery to alternate pocket and noted indicator light initially yellow, but turned red within a few minutes. Attempted cleaning contacts. Battery charger again turned red. Battery replaced to ensure safe use per  guideline. Defective battery returned to "[employer] for further investigation. Will continue to follow with patient for ongoing education and LVAD support. Alexandr Lim RN.

## 2022-04-06 NOTE — PROGRESS NOTES
Critical Care Note     Cardiac surgery was called for the evaluation and management of: NYHA class IV A/C systolic heart failure, inotrope dependence, right ventricular dysfunction     The critical nature of the patient's condition includes the following: The patient is at imminent risk of death from NYHA class IV A/C systolic heart failure, inotrope dependence, and right ventricular dysfunction. He is at imminent risk of needing escalation of MCS and right sided MCS    Critical care diagnoses that are being treated:  NYHA class IV A/C systolic heart failure / inotrope dependence  Right ventricular dysfunction     HPI: Patient is a 70 yo with severe NYHA class IV A/C systolic heart failure, inotrope dependence, and right ventricular dysfunction. The patient is at imminent risk of death from NYHA class IV A/C systolic heart failure, inotrope dependence, and right ventricular dysfunction. He is at imminent risk of needing escalation of MCS and right sided MCS.   Asked to evaluate for permanent LVAD    NYHA class IV A/C systolic heart failure / cardiogenic shock   Held milrinone at midnight   NT pro-BNP 4K  Pulmonary edema on CXR  CVP 10-15, PA 40/20's  Cardiac index 2's  's    Right ventricular dysfunction   At least moderate RV dysfunction on TTE    Addendum:  Going over CHEL  Looks like RV dysfunction more on the severe side  Starting inhaled NO    Addendum:  Starting Epi for RV dysfunction    Addendum  On Epi and Dobutamine for RV support  CVP 10-15  PA 40/20's  LVAD speed at 4600 - we tried to go up however LV really started to decompress    Addendum:  Plan to leave intubated overnight  Heparin protocol starting at midnight  Wean inhaled NO at midnight  Ok to wean pressors  Discussed with HF team           Intake/Output Summary (Last 24 hours) at 4/6/2022 1331  Last data filed at 4/6/2022 1300  Gross per 24 hour   Intake 5482.25 ml   Output 2399 ml   Net 3083.25 ml      Visit Vitals  BP (!) 89/61   Pulse 79   Temp 99.9 °F (37.7 °C)   Resp 16   Wt 188 lb 15 oz (85.7 kg)   SpO2 100%   BMI 24.93 kg/m²      CXR Results  (Last 48 hours)               04/06/22 0429  XR CHEST PORT Final result    Impression:  1. No significant change in the appearance of the chest or support hardware. Narrative:  INDICATION: . HF   COMPARISON: Previous chest xray, yesterday. LIMITATIONS: Portable technique. Cheryl [facility] FINDINGS: Single frontal view of the chest.    .   Lines/tubes/surgical: No significant change in the appearance of the drainage   approach Cushing-Senthil catheter, ET tube, gastric tube, mediastinal/chest drains and   LVAD. Heart/mediastinum: Cardiac assist device is likely unchanged. Lungs/pleura: No focal consolidation. No visualized pleural effusion or   pneumothorax. Additional Comments: None. Cheryl Álvarez 04/05/22 1523  XR CHEST PORT Final result    Impression:  Support devices in place. No acute changes. Narrative:  Clinical indication: Shortness of breath. Portable AP semiupright view of the chest obtained and compared to April 3. Cushing-Senthil catheter is in the outflow tract, ET tube and NG tube are in place,   chest tubes in place. Lung fields are clear, heart size is normal. Assisted   device in place. Next                   LVAD   Pump Speed (RPM): 4600  Pump Flow (LPM): 3.2  PI (Pulsitility Index): 6.2  Power: 2.9   Test: Yes  Back Up  at Bedside & Labeled: Yes  Power Module Test: Yes  Driveline Site Care: No  Driveline Dressing: Clean, Dry, and Intact  Testing  Alarms Reviewed: Yes  Back up SC speed: 4600  Back up Low Speed Limit: 4000  Emergency Equipment with Patient?: Yes  Emergency procedures reviewed?: Yes  Drive line site inspected?: No  Drive line intergrity inspected?: Yes  Drive line dressing changed?: No      Total critical care time - 165 minutes (CPT 29663, 99292 x 4)      I personally spent the above critical care time.   This is time spent at this critically ill patient's bedside / unit / floor actively involved in patient care as well as the coordination of care. This does not include any procedural time which has been billed separately. This does not include any NP/PA patient care time. I had a face to face encounter with the patient, reviewed and interpreted patient data including clinical events, labs, images, vital signs, I/O's, and examined patient. I have discussed the case and the plan and management of the patient's care with the consulting services and bedside nurses. This patient has a high probability of imminent, clinically significant deterioration, which requires the highest level of preparedness to intervene urgently. I participated in the decision-making and personally managed or directed the management of life and organ supporting interventions to treat or prevent imminent deterioration. This patient has a critical illness or injury that is acutely impairing one or more vital organ systems such that there is a high probability of imminent or life threatening deterioration in the patient's condition. This patient requires high complexity medical decision making to assess, manipulate, and support vital organ system failure. After stabilization, this patient still requires management to prevent further life / organ threatening deterioration.

## 2022-04-06 NOTE — PROGRESS NOTES
0800: Bedside and Verbal shift change report given to [facility] RN and Con Booker RN (oncoming nurse) by [facility] RN (offgoing nurse). Report included the following information SBAR, Kardex, OR Summary, Intake/Output, MAR, Recent Results, Cardiac Rhythm NSR and Alarm Parameters . 0830: Caty Dickson MD at [facility]. Updated on status of patient. Plan of care reviewed. 6560: RT at bedside. iNo2 weaned from 5 to 2. Patient tolerated. 1000: Gisela Gold MD and Clementina Atkinson NP at bedside. Plan of care reviewed. Plans to wean iNo2 off today as patient tolerates and work towards extubation. Orders received to give albumin and complete EKG. Plans to possibly wean dobutamine today if patient tolerates iNo2 weaning and extubation. MD to assess QTc on EKG and decide when amio gtt should be weaned. 1015: RT at bedside. iNo2 weaned from 2ppm to 1ppm.    1116: RT at bedside. iNo2 weaned off.      1619: Propofol stopped and precedex weaned for possible extubation. 1330: RT called for patient's rate to be halved. 1350: RT at bedside. Patient's rate changed from 16 to 8 on vent. 1355: Patient's vent changed to spontaneous. Patient tolerating well. 1357: Reinaldo NAVARRO at bedside. Orders received to continue with extubation. 1405Janice Miller MD at bedside. Okay to continue with extubation. 1421: RT at bedside, ABG drawn. 7.44/30.9/143.1/21.2/-2.4. Caty Dickson MD notified. Orders received to extubate. 1439: Patient extubated to 4L NC. No stridor noted. 1540: Reinaldo NAVARRO at bedside. Orders received to start weaning dobutamine and attempt to sit patient on edge of bed this afternoon. 1602: Patient's LVAD low flow alarm X1. MAP 66, Pump flow 2.2 briefly. Resolved with no intervention. Pump flow returned to 3.2    1615: Clementina Atkinson NP at bedside. Updated on status of patient. Discussed weaning dobutmaine. Orders received to wean dobutamine by 1mcg/kg/min every hour if patient tolerates until dobutamine dose 5 mcg/kg/min.      1625: Patient low flowed again. Flow 2.2. Roger Sweeney NP notified. Orders received to give 2100 dose of 500ml albumin now. 1731: Patient sitting on edge of bed dangling legs. Patient vomited X1. PRN zofran given. 2000: Bedside and Verbal shift change report given to [facility] (oncoming nurse) by Niko Gomes and Teodora Valdovinos RN (offgoing nurse). Report included the following information SBAR, Kardex, OR Summary, Intake/Output, MAR, Recent Results, Cardiac Rhythm Sinus arrythmia and Alarm Parameters .

## 2022-04-06 NOTE — PROGRESS NOTES
[facility]    ICU TEAM Progress Note    Name: Radha Bartlett   : 1945   MRN: [id number]   Date: 2022           ICU Assessment     Post LVAD         ICU Comprehensive Plan of Care: This is a 29-year-old male with past medical history of ischemic cardiomyopathy (EF 15 to 20%), CAD status post four-vessel CABG (), hypertension, left upper lobe adenocarcinoma status post radiotherapy (), MGUS who is postop day 1 from LVAD placement for stage D systolic heart failure. Intra-op course course complicated by bleeding requiring multiple rounds of pRBC's, platelets, FFP, cellsaver, and cryoprecipitate. Intra-op CHEL significant for moderate to severe RV dysfunction. General/neuro: Patient is sedated on Precedex infusion. Follows commands on sedation vacation. Respiratory: Postop respiratory failure, intubation day #1. Nitric oxide wean in progress with stable right-sided pressures. Plan for extubation today. Cardiac: Status post HeartMate 3 LVAD and aortic valve closure, day 1. Dobutamine and epinephrine at 7 mcg and 2 mcg respectively for inotropic support. Nicardipine infusion targeting MAP 70-75. No device related issues. GI: N.p.o.    ID: Perioperative antibiotics including Vanco, Levaquin, fluconazole as per LVAD protocol. Renal: No acute issues. Prophylaxis: Weight-based heparin started with minimal chest tube output. Subjective:   Progress Note: 2022      Reason for ICU Admission: Post LVAD    HPI: As Above    Overnight Events:   2022      POD:  Day of Surgery    S/P:   Procedure(s):  REDO STERNOTOMY; RIGHT GROIN CUTDOWN FOR CANNULATION;  INSERTION OF LEFT VENTRICULAR ASSIST DEVICE (HMIII); AORTIC VALVE CLOSURE CHEL BY DR. Gordan Snellen.     Active Problem List:     Problem List  Date Reviewed: 3/17/2022          Codes Class    S/P ventricular assist device [facility]) ICD-10-CM: E52.601  ICD-9-CM: V45.89     Overview Signed 2022  2:22 PM by Charlette Choi PA     REDO STERNOTOMY   RIGHT GROIN CUTDOWN FOR CANNULATION with access of CVF and CFA  INSERTION OF LEFT VENTRICULAR ASSIST DEVICE (HMIII)  AORTIC VALVE CLOSURE with Park's stitch             HFrEF (heart failure with reduced ejection fraction) ([facility]) ICD-10-CM: I50.20  ICD-9-CM: 428.20         NSTEMI (non-ST elevated myocardial infarction) ([facility]) ICD-10-CM: I21.4  ICD-9-CM: 410.70         Acute on chronic clinical systolic heart failure ([facility]) ICD-10-CM: I50.23  ICD-9-CM: 428.23         Active advance directive on file ICD-10-CM: Z78.9  ICD-9-CM: V49.89         Type 2 diabetes, controlled, with peripheral circulatory disorder ([facility]) ICD-10-CM: E11.51  ICD-9-CM: 250.70, 443.81         CHF, stage C ([facility] ICD-10-CM: I50.9  ICD-9-CM: 428.0         Mixed hyperlipidemia ICD-10-CM: E78.2  ICD-9-CM: 272.2         Proteinuria ICD-10-CM: R80.9  ICD-9-CM: 791.0         BPH without obstruction/lower urinary tract symptoms ICD-10-CM: N40.0  ICD-9-CM: 600.00         Hematuria, gross ICD-10-CM: R31.0  ICD-9-CM: 599.71         Cardiac arrest ([facility] ICD-10-CM: I46.9  ICD-9-CM: 427.5         Insomnia, unspecified ICD-10-CM: G47.00  ICD-9-CM: 780.52         Encounter for long-term (current) use of other medications ICD-10-CM: Z79.899  ICD-9-CM: V58.69         Calculus of kidney ICD-10-CM: N20.0  ICD-9-CM: 592.0         Coronary atherosclerosis of native coronary artery ICD-10-CM: I25.10  ICD-9-CM: 414.01         Benign neoplasm of colon ICD-10-CM: D12.6  ICD-9-CM: 211.3         Unspecified sleep apnea ICD-10-CM: G47.30  ICD-9-CM: 780.57         Reflux esophagitis ICD-10-CM: K21.00  ICD-9-CM: 530.11               Past Medical History:      has a past medical history of CAD (coronary artery disease) ('90 '97, '13 x 2), Calculus of kidney, Colonic polyps, Congestive heart failure, unspecified, Diabetes ([facility] Gastritis, Hypercholesterolemia, and Sleep apnea.     Past Surgical History:      has a past surgical history that includes hx heent; colonoscopy (04/06/2011); endoscopy, colon, diagnostic; hx coronary artery bypass graft (8/22/12); hx pacemaker placement (1/30/13); pr cardiac surg procedure unlist (2012); and colonoscopy (N/A, 3/2/2022). Home Medications:     Prior to Admission medications    Medication Sig Start Date End Date Taking? Authorizing Provider   potassium chloride SR (K-TAB) 20 mEq tablet Take 2 Tablets by mouth two (2) times a day. 3/9/22  Yes Alisa Wong NP   milrinone (PRIMACOR) 20 mg/100 mL (200 mcg/mL) infusion 28.5 mcg/min by IntraVENous route continuous. 3/3/22  Yes Alisa Wong NP   tamsulosin (FLOMAX) 0.4 mg capsule TAKE 1 CAPSULE DAILY 3/2/22  Yes Chuck Gustafson MD   bumetanide (BUMEX) 2 mg tablet Take 1 Tablet by mouth two (2) times a day. Patient taking differently: Take 2 mg by mouth two (2) times a day. 2 mg in am 1mg in evening 2/11/22  Yes Stefan Chen NP   prasugreL (Effient) 10 mg tablet Take 10 mg by mouth daily. Yes Provider, Historical   metFORMIN (GLUCOPHAGE) 500 mg tablet Take 1 Tablet by mouth two (2) times daily (with meals). 1/11/22  Yes Chuck Gustafson MD   ranolazine ER (Ranexa) 500 mg SR tablet Take 1 Tablet by mouth two (2) times a day. 1/11/22  Yes Chuck Gustafson MD   eplerenone (INSPRA) 50 mg tab tablet Take 1 Tablet by mouth daily. 1/11/22  Yes Chuck Gustafson MD   isosorbide mononitrate ER (IMDUR) 30 mg tablet Take 1 Tablet by mouth every twelve (12) hours. 1/4/22  Yes Sergio Martel NP   hydrALAZINE (APRESOLINE) 50 mg tablet TAKE 1 TABLET BY MOUTH THREE TIMES DAILY 12/17/21  Yes TerryTamy reyes NP   dapagliflozin Dary Matas) 10 mg tab tablet Take 1 Tablet by mouth daily.  11/12/21  Yes Jared Martines, NP   omeprazole (PRILOSEC) 20 mg capsule TAKE 1 CAPSULE BY MOUTH DAILY FOR INDIGESTION 7/6/21  Yes Con PAREDES NP   rosuvastatin (CRESTOR) 10 mg tablet TAKE 1 TABLET NIGHTLY 4/26/21  Yes Chuck Gustafson, MD   aspirin delayed-release 81 mg tablet Take 81 mg by mouth daily. Yes Provider, Historical   benzonatate (TESSALON) 200 mg capsule Take 200 mg by mouth three (3) times daily as needed for Cough. Patient not taking: Reported on 3/7/2022    Provider, Historical   diphenhydrAMINE (Benadryl Allergy) 25 mg tablet Take 25 mg by mouth every six (6) hours as needed. Needed every night d/t picc line causing itching  Patient not taking: Reported on 4/3/2022    Provider, Historical   glucose blood VI test strips (OneTouch Ultra Test) strip USE TO TEST BLOOD SUGAR DAILY 21   Roshan Gustafson MD   OneTouch Delica Plus Lancet 33 gauge misc USE TO TEST BLOOD SUGAR DAILY 21   Roshan Gustafson MD   lancets misc Use to test blood sugar daily 20   Roshan Gustafson MD   acetaminophen (TYLENOL EXTRA STRENGTH) 500 mg tablet Take  by mouth every six (6) hours as needed. Patient not taking: Reported on 3/17/2022    Provider, Historical       Allergies/Social/Family History: Allergies   Allergen Reactions    Oxycodone Anaphylaxis    Pcn [Penicillins] Hives      Social History     Tobacco Use    Smoking status: Never Smoker    Smokeless tobacco: Never Used   Substance Use Topics    Alcohol use: Yes     Alcohol/week: 0.0 standard drinks     Comment:  VERY RARE      Family History   Problem Relation Age of Onset    Heart Disease Mother         MI    Heart Disease Father         CAD & PVD    Lung Disease Father     Cancer Father         lung    Diabetes Maternal Grandmother     Heart Disease Other         CAD    Stroke Sister        Review of Systems:     A comprehensive review of systems was negative except for that written in the HPI.     Objective:   Vital Signs:  Visit Vitals  BP (!) 89/61   Pulse 82   Temp 100 °F (37.8 °C)   Resp 16   Wt 85.7 kg (188 lb 15 oz)   SpO2 100%   BMI 24.93 kg/m²      O2 Device: Endotracheal tube,Ventilator Temp (24hrs), Av.4 °F (37.4 °C), Min:96.4 °F (35.8 °C), Max:100.5 °F (38.1 °C)    CVP (mmHg): 7 mmHg (04/06/22 0900)      Intake/Output:     Intake/Output Summary (Last 24 hours) at 4/6/2022 0920  Last data filed at 4/6/2022 0900  Gross per 24 hour   Intake 6169.62 ml   Output 2504 ml   Net 3665.62 ml       Physical Exam:    General appearance: no distress, appears stated age  Respiratory: Decreased breath sounds b/l  Cardiac: Normal Sinus rhythm   Abdomen: soft, hypoactive sounds  Extremities: warm, perfused. LABS AND  DATA: Personally reviewed  Recent Labs     04/06/22 0308 04/05/22 2016   WBC 5.9 6.5   HGB 8.0* 8.1*   HCT 25.0* 25.3*   * 129*     Recent Labs     04/06/22 0308 04/05/22 2016    138   K 4.1 4.1    109*   CO2 23 25   BUN 14 14   CREA 0.94 0.97   * 118*   CA 8.5 8.3*   MG 2.0 2.1     Recent Labs     04/06/22 0308 04/05/22 2016   AP 91 92   TP 6.0* 6.0*   ALB 3.3* 3.4*   GLOB 2.7 2.6     Recent Labs     04/06/22 0308 04/05/22  2337 04/05/22 2016 04/05/22 2016   INR 1.1  --   --  1.2*   PTP 11.9*  --   --  12.0*   APTT 31.5* 29.0   < > 31.7*    < > = values in this interval not displayed. Recent Labs     04/06/22  0424 04/05/22  1532   PHI 7.42 7.43   PCO2I 35.0 36.3   PO2I 408* 641*   FIO2I 80 100     No results for input(s): CPK, CKMB, TROIQ, BNPP in the last 72 hours. Hemodynamics:   PAP: PAP Systolic: 25 (00/39/47 8931) CO: CO (l/min): 4 l/min (04/06/22 0900)   Wedge:   CI: CI (l/min/m2): 2 l/min/m2 (04/06/22 0900)   CVP:  CVP (mmHg): 7 mmHg (04/06/22 0900) SVR:       PVR:       Ventilator Settings:  Mode Rate Tidal Volume Pressure FiO2 PEEP   Assist control,Volume control   520 ml    40 % 5 cm H20     Peak airway pressure: 19 cm H2O    Minute ventilation: 7.82 l/min        MEDS: Reviewed    Chest X-Ray:  CXR Results  (Last 48 hours)               04/06/22 0429  XR CHEST PORT Final result    Impression:  1. No significant change in the appearance of the chest or support hardware. Narrative:  INDICATION: . HF   COMPARISON: Previous chest xray, yesterday. LIMITATIONS: Portable technique. Ron Whitmore FINDINGS: Single frontal view of the chest.    .   Lines/tubes/surgical: No significant change in the appearance of the drainage   approach [place] catheter, ET tube, gastric tube, mediastinal/chest drains and   LVAD. Heart/mediastinum: Cardiac assist device is likely unchanged. Lungs/pleura: No focal consolidation. No visualized pleural effusion or   pneumothorax. Additional Comments: None. Ron Whitmore 04/05/22 1523  XR CHEST PORT Final result    Impression:  Support devices in place. No acute changes. Narrative:  Clinical indication: Shortness of breath. Portable AP semiupright view of the chest obtained and compared to April 3. [place] catheter is in the outflow tract, ET tube and NG tube are in place,   chest tubes in place. Lung fields are clear, heart size is normal. Assisted   device in place. Next                 ECHO:        Multidisciplinary Rounds Completed: Yes    ABCDEF Bundle/Checklist Completed:  Yes    SPECIAL EQUIPMENT  PA Catheter    DISPOSITION  Stay in ICU    CRITICAL CARE CONSULTANT NOTE  I had a face to face encounter with the patient, reviewed and interpreted patient data including clinical events, labs, images, vital signs, I/O's, and examined patient. I have discussed the case and the plan and management of the patient's care with the consulting services, the bedside nurses and the respiratory therapist.      NOTE OF PERSONAL INVOLVEMENT IN CARE   This patient has a high probability of imminent, clinically significant deterioration, which requires the highest level of preparedness to intervene urgently. I participated in the decision-making and personally managed or directed the management of the following life and organ supporting interventions that required my frequent assessment to treat or prevent imminent deterioration.     I personally spent 50 minutes of critical care time. This is time spent at this critically ill patient's bedside actively involved in patient care as well as the coordination of care. This does not include any procedural time which has been billed separately.     Norma Bernal,   Staff Intensivist/Anesthesiologist  [facility]  4/6/2022

## 2022-04-06 NOTE — DIABETES MGMT
[facility]    CLINICAL NURSE SPECIALIST CONSULT     Initial Presentation   Kavitha Khoury is a 69 y.o. male who was admitted 4/3/22 for medical optimization of severe ischemic cardiomyopathy with heart failure with reduced ejection fraction prior to LVAD implantation. HX:   Past Medical History:   Diagnosis Date    CAD (coronary artery disease) '90 '97, '13 x 2    MI, code ice in 2013 at [facility]    Calculus of kidney     Colonic polyps     Congestive heart failure, unspecified     Diabetes ([facility]     Gastritis     Hypercholesterolemia     Sleep apnea         INITIAL DX:   HFrEF (heart failure with reduced ejection fraction) ([facility] [I50.20]     Current Treatment     TX: Diuretic adjustment, antibiotics, milrinone    Consulted by Malvin Oshea NP for advanced diabetes nursing assessment and care for:   [x] Inpatient management strategy    Hospital Course   Clinical progress has been uncomplicated. 4/3: Admission  4/5: LVAD implant  4/6: Extubated  Diabetes History   Type 2 Diabetes- A1C 6.8%  Ambulatory BG management provided by: Charito Alba MD PCP  Family history positive for diabetes: Maternal Grandmother with DM2       Diabetes-related Medical History  Neurological complications  Peripheral neuropathy  Macrovascular disease  CAD and Myocardial infarction  Other associated conditions     CHF, Sleep Apnea    Diabetes Medication History  Key Antihyperglycemic Medications             metFORMIN (GLUCOPHAGE) 500 mg tablet (Taking) Take 1 Tablet by mouth two (2) times daily (with meals). dapagliflozin (Farxiga) 10 mg tab tablet (Taking) Take 1 Tablet by mouth daily.            Diabetes self-management practices:   Eating pattern  [x]? Breakfast         Eggs, toast, oatmeal  [x]? Lunch              Sometimes skips or piece of fruit  [x]? Dinner              Chicken/steak with a sweet potato or bowl of soup  [x]?         Bedtime Fruit  [x]? Snacks            Fruit  [x]? Beverages       Water  Physical activity pattern  Limited with HF  Monitoring pattern  Tests 2-3 times weekly  When testing, will see 106-135  Taking medications pattern  [x]? Consistent administration  [x]? Affordable  Social determinants of health impacting diabetes self-management practices   Concerned that you need to know more about how to stay healthy with diabetes  Overall evaluation:               [x]? Achieving A1c target with drug therapy & self-care practices    Subjective   I am ok.       On home inotropes since Nov  Objective   Physical exam  General           Normal weight male in acute post-op pain. Conversant and cooperative  Neuro  Alert, oriented, weak, tired  Vital Signs   Visit Vitals  BP (!) 89/61   Pulse 80   Temp 99.9 °F (37.7 °C)   Resp 21   Wt 85.7 kg (188 lb 15 oz)   SpO2 100%   BMI 24.93 kg/m²     Skin  Warm and dry. No acanthosis noted along neckline. No lipohypertrophy or lipoatrophy noted at injection sites   Heart   Regular rate and rhythm.  No murmurs, rubs or gallops  Lungs  Clear to auscultation without rales or rhonchi  Extremities No foot wounds      Laboratory  Recent Labs     04/06/22  0308 04/05/22 2016 04/05/22  1539   * 118* 99   AGAP 6 4* 7   WBC 5.9 6.5 6.5   CREA 0.94 0.97 0.96   GFRNA >60 >60 >60   * 82* 46*   ALT 17 17 16       Factors impacting BG management  Factor Dose Comments   Nutrition:  Standard meals       60 grams/meal        CHF On chronic milrinone Impaired insulin delivery         Blood glucose pattern      Significant diabetes-related events over the past 24-72 hours  A1C 6.8%  LVAD 4/5  On insulin, dobutamine, epi, heparin, cardene  On insulin gtt:   4/5: 21.8 units post-op  4/6: 14.9 units since MN    Assessment and Plan   Nursing Diagnosis Risk for unstable blood glucose pattern   Nursing Intervention Domain 9862 Decision-making Support   Nursing Interventions Examined current inpatient diabetes/blood glucose control   Explored factors facilitating and impeding inpatient management  Explored corrective strategies with patient and responsible inpatient provider   Informed patient of rational for insulin strategy while hospitalized     Evaluation   Dandre Garg is a 69year old gentleman, with controlled Type 2 Diabetes, admitted for medical optimization for severe ischemic cardiomyopathy and heart failure with reduced ejection fraction prior to LVAD implant. A1C on admission was 6.8% and BG was in goal without the need for anti-hyperglycemic agents pre-op. Following LVAD implant, he was started on an insulin gtt which has maintained glucose in goal at low rates. Please continue current therapy. Recommendations   1. Insulin gtt per CTS protocol    2. Diet advancement per primary team, when advanced, include consistent carbohydrate portion of diet (60 grams CHO/meal)- can be added to clear/full liq diets    3. Do use imbedded 1:15 humalog carb coverage order when taking PO    Billing Code(s)   [x] 97625    Before making these care recommendations, I personally reviewed the hospitalization record, including notes, laboratory & diagnostic data and current medications, and examined the patient at the bedside (circumstances permitting) before making care recommendations. More than fifty (50) percent of the time was spent in patient counseling and/or care coordination.   Total minutes: 13      FAROOQ Tovar  Diabetes Clinical Nurse Specialist  [facility]  Access via [employer]

## 2022-04-06 NOTE — PROGRESS NOTES
[facility] Brief Note      Pt arrived to CVICU post LVAD implant. Intensivist at bedside also. Handoff received from anesthesia and CSS PA. Pt slightly hypertensive with occasional low flows- will start cardene gtt. Leave Epi and Dobut for RV support    Keep intubated overnight per Dr. PRESLEY [employer].   Ok to start wean of nitric and heparin gtt at midnight if CT drainage at goal.         Susan Tripathi NP  [address], [address] [place] [address]  Office [phone]  Fax [phone]

## 2022-04-06 NOTE — PROGRESS NOTES
[address]  Heart Failure History and Physical    Patient name: Morgan Davey  Patient : 1945  Patient MRN: [id number]  Date of service: 22    CHIEF COMPLAINT:  HFrEF     PLAN OF CARE:   · Severe ischemic cardiomyopathy, LVEF 15-20%; stage D, NYHA class IV symptoms; s/p LVAD implant with HM3 as DT  (22)  · Was re-do sternotomy, bleeding intra-op, required cryo, k-centra, FFP, Plt, and cellsaver in OR  · RV dysfunction noted intra-op    RECOMMENDATIONS:   POD # 1 from LVAD Implant  Continue LVAD at current speed  Wean Loren and wean vent to extubate- per CSS and Intensivist  Continue dobut; ok to slowly wean down to 5 mcg/kg. min after extubation  Continue Epi at 2, do not wean at this time  Continue cardene gtt for afterload reduction  albumin 5%, 500mL BID today  No GDMT d/t recent surgery  Heparin gtt per protocol  Resume ASA tomorrow  Driveline dressing changes daily for now. Continue bowel regimen  SSI   Resume CPAP QHS  Advanced care plan forms completed  Genetic test negative   PYP negative   Strict I/O, daily weights, Na+ restricted diet   Continue VAD education     IMPRESSION:  Fatigue at rest  Shortness of breath with exertion  S/p LVAD implant as DT  chronic systolic heart failure  · Stage D, NYHA class IV symptoms improved to class II on inotropes  · Non-ischemic cardiomyopathy, LVEF 20% with LVEDD 6.2  · RV dysfunction, TAPSE 1.22 (prelimnary read)  · TBili 1.5 likely from cardiac congestion  At risk of sudden cardiac death  · Recent cardiac arrest   · S/p ICD (2013, [place] [facility] by [facility]); New implant on 10/21/2021 [place] [employer]  Coronary artery disease  · S/p multiple interventions  · S/p 4V CABG (2012)  · LHC (2019) severe stenosis of LIMA to LAD anastomosis site, SVG graft to OM is occluded, SVG graft to RCA 40-50%, LAD occluded proximally, severe proximal LCx, RCA is the long .    · PET (6/2019) EF 24% with anterior lateral and inferior reversible defect  Cardiac risk factors  · HTN  · HL  · DM2  · SRUTHI on CPAP  · MIld carotid stenosis  Anemia, microcytic  · Iron deficiency  DVT with filter  Pulmonary nodules   Dysphagia     CARDIAC IMAGING:  Echo (3/2/22)   · LV 10-15%  · Mod-severe MR     Echo (11/23/21):  · LV 15-20%. · Mod-severe, MR, mod-TR    Echo (5/20/21)  · LV: Estimated LVEF is 20 - 25%. Normal wall thickness. Mildly dilated left ventricle. Severely and globally reduced systolic function. Severe (grade 4) left ventricular diastolic dysfunction. · LA: Severely dilated left atrium. · RA: Severely dilated right atrium. · MV: Moderate mitral valve regurgitation is present. · TV: Moderate tricuspid valve regurgitation is present. · AO: Mild aortic root dilatation. · RV: Pacer/ICD present. · LVED 6.2cm    EKG (5/20/21) SB with 1degree AV block, QRS 116ms     LHC (5/24/21) Native Coronaries: LM - moderate to severe distal disease 50%. % prox occluded (), heavily calcified, LCx: 80% proximal stenosis. OM1 is  (seen filling faintly via collaterals). OM2 99% stenosis. RCA: 100% proximal occluded.  LIMA to LAD: patent, supplies only a diagonal branch (probably D2).  The LAD distal to the bifurcation is 100% occluded () and fills via right to left collaterals off the SVG to RCA. SVG to R-PDA: patent with moderate diffuse irregularities but no obstructive disease in the graft.    No appealing interventional targets.      NST as above     ICD Interrogation (11/12/2021) AppCast VVI, thoracic impedence trending up, no events, normal device function and good battery  ICD interrogation (5/12/21) [employer] single lead, no events, normal device function and good battery     HEMODYNAMICS:  RHC 5/24/21 CI 1.97, PA 33/9/17, RA 1, PCWP 6- off milrinone   CPEST not done     Patient underwent a 6 minute walk test 11/12/2021    6 Min Walk Report 11/12/2021 7/6/2021 5/20/2021   (PRE) HR 88 98 74   (PRE) O2 Sat 100 - 99   (POST)  - 81   (POST) O2 Sat 99 98% on Ra 99   Distance in Meters 386.58 - 1158.24         OTHER IMAGING:  CXR (6/17/21) clear  CT 5/21/21 1. Irregular pulmonary nodule in the left upper lobe measuring 2 cm, suspicious for primary pulmonary malignancy. 2. Additional 6 mm left upper lobe pulmonary nodule. 3. Severe calcific atherosclerosis of the coronary arteries and abdominal aorta. No aneurysm. 4. Cardiomegaly. 5. No acute abnormality within the chest, abdomen, or pelvis. HISTORY OF PRESENT ILLNESS:  Briefly, Abdi Hartley is a 69 y.o. male with h/o HTN, HL, DM2, SRUTHI on CPAP, chronic systolic heart failure, stage D, NYHA class IV symptoms, non-ischemic cardiomyopathy, LVEF 20% with LVEDD 6.2, RV dysfunciton, TAPSE 1.22, TBili 1.5 likely from cardiac congestion, recent cardiac arrest s/p ICD (1/2013, [employer] followed by [facility]), coronary artery disease s/p multiple interventions s/p 4V CABG (8/2012), LHC (7/2019) severe stenosis of LIMA to LAD anastomosis site, SVG graft to OM is occluded, SVG graft to RCA 40-50%, LAD occluded proximally, severe proximal LCx, RCA is the long . PET (6/2019) EF 24% with anterior lateral and inferior reversible defect. Anemia, microcytic with iron deficiency, DVT with filter.     [facility] by Dr. Maria Alejandra Hoskins for evaluation of his candidacy for advanced therapies.     Patient agreed to inpatient initiation of palliative infusion of chronic inotropes and evaluation for LVAD-DT. Patient was admitted 5/2-5/25/21. Patient was started on milrinone infusion and had first part of LVAD evaluation completed. Right and left heart cath on 5/24/21 that showed severe diffuse native vessel CAD, with patent grafts (LIMA to D2, SVG to RCA).  Antiplatelet therapy was held during hospitalization and after discharge due to anticipated pulmonary nodule biopsy, bone marrow biopsy and EGD/C-Scope as part of LVAD workup.     Patient was readmitted 5/26-5/28/21 with hypertension, headache and chest pain, his troponin peaked at 10; he was shortly bridged with heparin, otherwise had normal EKG and chest CT negative for PE. Cardiology and AHF service was consulted and patient was discharged home after troponin came down.     Patient has now completed radiation treatment for adenocarcinoma of the lung. Hem-onc has cleared patient for VAD implant with 90% 2 year prognosis. He was most recently admitted for elective pre-op EGD and colonoscopy which he tolerated well; path negative for malignancy. He presents today for elective admission to [facility] for hemodynamic optimization prior to LVAD implant. REVIEW OF SYSTEMS:  Review of Systems   Unable to perform ROS: Intubated        PHYSICAL EXAM:  Visit Vitals  BP (!) 89/61   Pulse 82   Temp 100 °F (37.8 °C)   Resp 16   Wt 188 lb 15 oz (85.7 kg)   SpO2 100%   BMI 24.93 kg/m²     Physical Exam  Constitutional:       General: He is not in acute distress. Interventions: He is sedated and intubated. Cardiovascular:      Rate and Rhythm: Normal rate and regular rhythm. Pulmonary:      Effort: No respiratory distress. He is intubated. Abdominal:      General: Abdomen is flat. Palpations: Abdomen is soft. Musculoskeletal:      Right lower leg: No edema. Left lower leg: No edema. Skin:     General: Skin is warm and dry.           PAST MEDICAL HISTORY:  Past Medical History:   Diagnosis Date    CAD (coronary artery disease) '90 '97, '13 x 2    MI, code ice in 2013 [facility]    Calculus of kidney     Colonic polyps     Congestive heart failure, unspecified     Diabetes ([facility] [address]     Gastritis     Hypercholesterolemia     Sleep apnea        PAST SURGICAL HISTORY:  Past Surgical History:   Procedure Laterality Date    COLONOSCOPY  04/06/2011    16, due 21    COLONOSCOPY N/A 3/2/2022    COLONOSCOPY    :- performed by Paco Alcantar MD at [address] ENDOSCOPY, COLON, DIAGNOSTIC      11, due 16    HX CORONARY ARTERY BYPASS GRAFT  8/22/12    x 4 vessel by MISSY Ramirez    HX HEENT      LASIK    HX PACEMAKER PLACEMENT  1/30/13    [place] CARDIAC SURG PROCEDURE UNLIST  2012    x 4 vessels       FAMILY HISTORY:  Family History   Problem Relation Age of Onset    Heart Disease Mother         MI    Heart Disease Father         CAD & PVD    Lung Disease Father     Cancer Father         lung    Diabetes Maternal Grandmother     Heart Disease Other         CAD    Stroke Sister        SOCIAL HISTORY:  Social History     Socioeconomic History    Marital status:    Tobacco Use    Smoking status: Never Smoker    Smokeless tobacco: Never Used   Vaping Use    Vaping Use: Never used   Substance and Sexual Activity    Alcohol use: Yes     Alcohol/week: 0.0 standard drinks     Comment:  VERY RARE    Drug use: No       LABORATORY RESULTS:  Labs Latest Ref Rng & Units 4/6/2022 4/5/2022 4/5/2022 4/5/2022 4/5/2022 4/5/2022 4/4/2022   WBC 4.1 - 11.1 K/uL 5.9 6.5 6.5 - - 3. 5(L) 3.5(L)   RBC 4.10 - 5.70 M/uL 3.22(L) 3.26(L) 2.95(L) - - 4.82 4.79   Hemoglobin 12.1 - 17.0 g/dL 8. 0(L) 8. 1(L) 7. 5(L) - - 11. 9(L) 11. 9(L)   Hematocrit 36.6 - 50.3 % 25. 0(L) 25. 3(L) 23. 2(L) - - 37.4 36.9   MCV 80.0 - 99.0 FL 77. 6(L) 77. 6(L) 78. 6(L) - - 77. 6(L) 77. 0(L)   Platelets 738 - 449 K/uL 149(L) 129(L) 113(L) 134(L) 109(L) 119(L) 137(L)   Lymphocytes 12 - 49 % - 2(L) 5(L) - - - -   Monocytes 5 - 13 % - 2(L) 2(L) - - - -   Eosinophils 0 - 7 % - 0 0 - - - -   Basophils 0 - 1 % - 0 0 - - - -   Bands 0 - 6 % - 3 3 - - - -   Albumin 3.5 - 5.0 g/dL 3. 3(L) 3.4(L) 3. 1(L) - - 3. 2(L) 3.0(L)   Calcium 8.5 - 10.1 MG/DL 8.5 8.3(L) 7. 9(L) - - 9.0 8.7   Glucose 65 - 100 mg/dL 115(H) 118(H) 99 - - 115(H) 107(H)   BUN 6 - 20 MG/DL 14 14 15 - - 17 21(H)   Creatinine 0.70 - 1.30 MG/DL 0.94 0.97 0.96 - - 0.91 1.10   Sodium 136 - 145 mmol/L 137 138 140 - - 133(L) 134(L)   Potassium 3.5 - 5.1 mmol/L 4.1 4.1 3.4(L) - - 4.6 4.1   TSH 0.36 - 3.74 uIU/mL - - - - - - -   LDH 85 - 241 U/L 308(H) - 232 - - - -   Some recent data might be hidden       ALLERGY:  Allergies   Allergen Reactions    Oxycodone Anaphylaxis    Pcn [Penicillins] Hives        CURRENT MEDICATIONS:    Current Facility-Administered Medications:     albumin human 5% (BUMINATE) solution 25 g, 25 g, IntraVENous, Q12H, Orlando JACOBSON, NP    sodium chloride (NS) flush 5-40 mL, 5-40 mL, IntraVENous, Q8H, PollVeronica soliman NP, 10 mL at 04/06/22 0505    sodium chloride (NS) flush 5-40 mL, 5-40 mL, IntraVENous, PRN, Ashley Wong NP    0.9% sodium chloride infusion, 9 mL/hr, IntraVENous, CONTINUOUS, PollAshley soliman NP, Last Rate: 9 mL/hr at 04/06/22 0801, 9 mL/hr at 04/06/22 0801    acetaminophen (TYLENOL) tablet 650 mg, 650 mg, Oral, Q6H PRN, Ashley Wong NP, 650 mg at 04/06/22 0205    naloxone (NARCAN) injection 0.4 mg, 0.4 mg, IntraVENous, PRN, PollAshley soliman NP    mupirocin (BACTROBAN) 2 % ointment, , Both Nostrils, BID, Ashley Wong NP, Given at 04/06/22 0930    levoFLOXacin (LEVAQUIN) 250 mg in D5W IVPB, 250 mg, IntraVENous, Q24H, Ashley Wong NP, Last Rate: 50 mL/hr at 04/06/22 0505, 250 mg at 04/06/22 0505    rifAMPin (RIFADIN) 600 mg in 0.9% sodium chloride 100 mL IVPB, 600 mg, IntraVENous, DAILY, PolliardVeronica NP, Last Rate: 200 mL/hr at 04/06/22 0341, 600 mg at 04/06/22 0341    fluconazole (DIFLUCAN) 200mg/100 mL IVPB (premix), 200 mg, IntraVENous, Q24H, Ashley Wong NP, Last Rate: 100 mL/hr at 04/06/22 0428, 200 mg at 04/06/22 0428    ondansetron (ZOFRAN) injection 4 mg, 4 mg, IntraVENous, Q4H PRN, Ashley Wong NP    albuterol-ipratropium (DUO-NEB) 2.5 MG-0.5 MG/3 ML, 3 mL, Nebulization, Q6H PRN, Ashley Wong NP    midazolam (VERSED) injection 1 mg, 1 mg, IntraVENous, Q1H PRN, Ashley Wong NP    chlorhexidine (PERIDEX) 0.12 % mouthwash 10 mL, 10 mL, Oral, BID, Ashley Wong, NP, 10 mL at 04/06/22 0930    senna-docusate (PERICOLACE) 8.6-50 mg per tablet 1 Tablet, 1 Tablet, Oral, DAILY, Raza Wong, NP, 1 Tablet at 04/06/22 1259    polyethylene glycol (MIRALAX) packet 17 g, 17 g, Oral, DAILY, PolliarRaza reynoso Pretty, NP, 17 g at 04/06/22 0927    ELECTROLYTE REPLACEMENT NOTE: Nurse to review Serum Potassium and Magnesuim levels and Initiate Electrolyte Replacement Protocol as needed, 1 Each, Other, PRN, Raza Wong Pretty, NP    insulin regular (NOVOLIN R, HUMULIN R) 100 Units in 0.9% sodium chloride 100 mL infusion, 0-50 Units/hr, IntraVENous, TITRATE, Raza Wong, NP, Last Rate: 0.8 mL/hr at 04/06/22 0803, 0.8 Units/hr at 04/06/22 0803    glucose chewable tablet 16 g, 4 Tablet, Oral, PRN, Raza Wong Pretty, NP    glucagon (GLUCAGEN) injection 1 mg, 1 mg, IntraMUSCular, PRN, Raza Wong Pretty, NP    insulin lispro (HUMALOG) injection, , SubCUTAneous, TIDAC, Polliard, Ole Pretty, NP    insulin lispro (HUMALOG) injection, , SubCUTAneous, AC&HS, Polliarangeles, Ole Pretty, NP    insulin glargine (LANTUS) injection 1-50 Units, 1-50 Units, SubCUTAneous, ONCE PRN, Raza Wong Pretty, NP    dextrose 10 % infusion 0-250 mL, 0-250 mL, IntraVENous, PRN, Raza Wong Pretty, NP    sucralfate (CARAFATE) tablet 1 g, 1 g, Oral, AC&HS, Polliard, Cee Broody T, NP, 1 g at 04/06/22 0633    famotidine (PF) (PEPCID) 20 mg in 0.9% sodium chloride 10 mL injection, 20 mg, IntraVENous, Q12H, Polljanny Millie T, NP, 20 mg at 04/06/22 0927    vancomycin (VANCOCIN) 1250 mg in  ml infusion, 1,250 mg, IntraVENous, Q18H, Raza Wong, NP, Last Rate: 125 mL/hr at 04/06/22 0938, 1,250 mg at 04/06/22 0938    0.45% sodium chloride infusion, 10 mL/hr, IntraVENous, CONTINUOUS, Adri Najera MD, Last Rate: 10 mL/hr at 04/06/22 0801, 10 mL/hr at 04/06/22 0801    niCARdipine (CARDENE) 50 mg in 0.9% sodium chloride 100 mL infusion, 0-15 mg/hr, IntraVENous, TITRATE, Adri Najera MD, Last Rate: 10 mL/hr at 04/06/22 0803, 5 mg/hr at 04/06/22 0803    heparin 25,000 units in  ml infusion, 400 Units/hr, IntraVENous, CONTINUOUS, PollRenetta soliman Prom T, NP, Last Rate: 4 mL/hr at 04/06/22 0013, 400 Units/hr at 04/06/22 0013    heparin 25,000 units in  ml infusion, 12-25 Units/kg/hr, IntraVENous, TITRATE, Madina Wong, NP, Last Rate: 9.4 mL/hr at 04/06/22 0802, 12 Units/kg/hr at 04/06/22 0802    propofol (DIPRIVAN) 10 mg/mL infusion, 0-50 mcg/kg/min, IntraVENous, TITRATE, Madina Wong, NP, Last Rate: 11.7 mL/hr at 04/06/22 0802, 25 mcg/kg/min at 04/06/22 0802    fentaNYL citrate (PF) injection 25 mcg, 25 mcg, IntraVENous, Q2H PRN, Madina Wong, NP, 25 mcg at 04/06/22 0228    DOBUTamine (DOBUTREX) 1,000 mg/250 mL (4,000 mcg/mL) infusion, 0-10 mcg/kg/min, IntraVENous, TITRATE, Madina Wong, NP, Last Rate: 9.1 mL/hr at 04/06/22 0802, 8 mcg/kg/min at 04/06/22 0802    amiodarone (CORDARONE) 900 mg/250 ml D5W infusion, 0.5-1 mg/min, IntraVENous, TITRATE, Madina Wong, NP, Last Rate: 16.7 mL/hr at 04/06/22 0937, 1 mg/min at 04/06/22 0937    dexmedeTOMidine in 0.9 % NaCl (PRECEDEX) 400 mcg/100 mL (4 mcg/mL) infusion soln, 0.1-1.5 mcg/kg/hr, IntraVENous, TITRATE, PollMadina soliman, NP, Last Rate: 19 mL/hr at 04/06/22 0802, 1 mcg/kg/hr at 04/06/22 0802    EPINEPHrine (ADRENALIN) 10 mg in 0.9% sodium chloride 250 mL infusion, 0-10 mcg/min, IntraVENous, TITRATE, Madina Wong NP, Last Rate: 3 mL/hr at 04/06/22 0803, 2 mcg/min at 04/06/22 0803    NOREPINephrine (LEVOPHED) 32,000 mcg in dextrose 5% 250 mL (128 mcg/mL) infusion, 0.5-16 mcg/min, IntraVENous, TITRATE, Madina Wong NP, Stopped at 04/05/22 1426    PHENYLephrine (MAINOR-SYNEPHRINE) 100 mg in 0.9% sodium chloride 250 mL infusion,  mcg/min, IntraVENous, TITRATE, Madina Wong NP    vasopressin (VASOSTRICT) 20 Units in 0.9% sodium chloride 100 mL infusion, 0-0.04 Units/min, IntraVENous, TITRATE, Madina Wong NP, Stopped at 04/05/22 2255      Thank you for your referral and allowing me to participate in this patient's care. Susan Tripathi, EARNESTINE  [address] [facility] [place]  [address] [place] [address], [address] [facility] [address], [address]  Office [phone]  Fax [phone]            [facility] ATTENDING ADDENDUM    Patient was seen and examined in person. Data and notes were reviewed. I have discussed and agree with the plan as noted in the NP note above without further additions.     Bonnie Alexander MD PhD  Amber Yanez

## 2022-04-06 NOTE — PROGRESS NOTES
Critical Care Note     Cardiac surgery was called for the evaluation and management of: NYHA class IV A/C systolic heart failure, inotrope dependence, right ventricular dysfunction     The critical nature of the patient's condition includes the following: The patient is at imminent risk of death from NYHA class IV A/C systolic heart failure, inotrope dependence, and right ventricular dysfunction. He is at imminent risk of needing escalation of MCS and right sided MCS    Critical care diagnoses that are being treated:  NYHA class IV A/C systolic heart failure / inotrope dependence  Right ventricular dysfunction     HPI: Patient is a 68 yo with severe NYHA class IV A/C systolic heart failure, inotrope dependence, and right ventricular dysfunction. The patient is at imminent risk of death from NYHA class IV A/C systolic heart failure, inotrope dependence, and right ventricular dysfunction. He is at imminent risk of needing escalation of MCS and right sided MCS.   Asked to evaluate for permanent LVAD    NYHA class IV A/C systolic heart failure / inotrope dependence  Continues on milrinone  NT pro-BNP 3K  Pulmonary edema on CXR  CVP 10-15, PA 40/20's  Cardiac index 2's  's     Right ventricular dysfunction   At least moderate RV dysfunction on TTE  Will need to take a look on CHEL tomorrow    Pre-op Studies:    Chest/Abd CT scan - RV a little close at mid-sternum, significant calcification of common femoral arteries (right side looks best for cannulation); minimal adipose tissue so careful with drive-line; previous CABG so will need redo saw (reasoanble distance from aorta - may have trouble with outflow graft given previous CABG and likely need for AV repair)     TTE - severe LV dilation with reduced EF (EF 15%, LVIDD 6.1 cm), RVIDD 5.1 cm, TAPSE 1.6, moderate TR, free wall moving some; overall at least moderate RV dysfunction; moderate AI (will likely need Carson' stitch)    ABIs - normal    PFTs - normal FEV-1    BUN 21, creatinine 1.1    AST 41, ALT 14, alk phos 140, Bilirubin 0.6    Hgb 11.9, Platelets 258 (suspect he will need 2 platelets right when we come off pump), INR 1.1     Saw patient. History and films reviewed. Risks and benefits explained. Agrees with surgery. Findings/Conditions: severe LV dysfunction   Plan of Care: LVAD    Risk of morbidity and mortality - high  Medical decision making - high complexity            Intake/Output Summary (Last 24 hours) at 4/6/2022 1309  Last data filed at 4/6/2022 1300  Gross per 24 hour   Intake 5979.25 ml   Output 2649 ml   Net 3330.25 ml      Visit Vitals  BP (!) 89/61   Pulse 79   Temp 99.9 °F (37.7 °C)   Resp 16   Wt 188 lb 15 oz (85.7 kg)   SpO2 100%   BMI 24.93 kg/m²      CXR Results  (Last 48 hours)               04/06/22 0429  XR CHEST PORT Final result    Impression:  1. No significant change in the appearance of the chest or support hardware. Narrative:  INDICATION: . HF   COMPARISON: Previous chest xray, yesterday. LIMITATIONS: Portable technique. Yaz Kid FINDINGS: Single frontal view of the chest.    .   Lines/tubes/surgical: No significant change in the appearance of the drainage   approach Bruno-Senthil catheter, ET tube, gastric tube, mediastinal/chest drains and   LVAD. Heart/mediastinum: Cardiac assist device is likely unchanged. Lungs/pleura: No focal consolidation. No visualized pleural effusion or   pneumothorax. Additional Comments: None. Southold Kid 04/05/22 1523  XR CHEST PORT Final result    Impression:  Support devices in place. No acute changes. Narrative:  Clinical indication: Shortness of breath. Portable AP semiupright view of the chest obtained and compared to April 3. Bruno-Senthil catheter is in the outflow tract, ET tube and NG tube are in place,   chest tubes in place. Lung fields are clear, heart size is normal. Assisted   device in place.  Next                       Total critical care time - 165 minutes (CPT 96336, 99292 x 4)      I personally spent the above critical care time. This is time spent at this critically ill patient's bedside / unit / floor actively involved in patient care as well as the coordination of care. This does not include any procedural time which has been billed separately. This does not include any NP/PA patient care time. I had a face to face encounter with the patient, reviewed and interpreted patient data including clinical events, labs, images, vital signs, I/O's, and examined patient. I have discussed the case and the plan and management of the patient's care with the consulting services and bedside nurses. This patient has a high probability of imminent, clinically significant deterioration, which requires the highest level of preparedness to intervene urgently. I participated in the decision-making and personally managed or directed the management of life and organ supporting interventions to treat or prevent imminent deterioration. This patient has a critical illness or injury that is acutely impairing one or more vital organ systems such that there is a high probability of imminent or life threatening deterioration in the patient's condition. This patient requires high complexity medical decision making to assess, manipulate, and support vital organ system failure. After stabilization, this patient still requires management to prevent further life / organ threatening deterioration.

## 2022-04-06 NOTE — PROGRESS NOTES
Physician Progress Note      PATIENT:               Mary Alice Nixon  CSN #:                  [id number]  :                       1945  ADMIT DATE:       4/3/2022 8:57 AM  DISCH DATE:  RESPONDING  PROVIDER #:        NEIL KENT          QUERY TEXT:    Pt admitted for LVAD insertion. Pt noted to have Intra-op course complicated by bleeding. If possible, please document in the progress notes and discharge summary if you are evaluating and/or treating any of the following: The medical record reflects the following:    Risk Factors: Iron Deficiency Anemia, Surgery    Clinical Indicators:     Op Note  Estimated Blood Loss:  2000ml    Cell Saver:  525ml    Hgb on  11.9-> 7.5 -> 8.1     Intensivist Progress note  Intra-op course complicated by bleeding requiring multiple rounds of pRBC's, platelets, FFP, cellsaver, and cryoprecipitate. Treatment: PRBC, Plt, FFP, Cellsaver, Cryo, Monitor H&H    Thank you,  Lindsey  [phone]  Options provided:  -- Chronic blood loss anemia  -- Acute on chronic blood loss anemia  -- Iron deficiency anemia  -- Postoperative acute blood loss anemia  -- Other - I will add my own diagnosis  -- Disagree - Not applicable / Not valid  -- Disagree - Clinically unable to determine / Unknown  -- Refer to Clinical Documentation Reviewer    PROVIDER RESPONSE TEXT:    Provider disagreed with this query. i do not follow these patients post op. I only write a brief op note from surgery. This deficiency is not mine.  Please remove this query and send it to the heart failure team or intensivist.    Query created by: Thom Mcgarry on 2022 11:54 AM      Electronically signed by:  Mike KENT 2022 1:55 PM

## 2022-04-07 ENCOUNTER — APPOINTMENT (OUTPATIENT)
Dept: NON INVASIVE DIAGNOSTICS | Age: 77
DRG: 001 | End: 2022-04-07
Attending: THORACIC SURGERY (CARDIOTHORACIC VASCULAR SURGERY)
Payer: MEDICARE

## 2022-04-07 ENCOUNTER — APPOINTMENT (OUTPATIENT)
Dept: GENERAL RADIOLOGY | Age: 77
DRG: 001 | End: 2022-04-07
Attending: NURSE PRACTITIONER
Payer: MEDICARE

## 2022-04-07 LAB
ADMINISTERED INITIALS, ADMINIT: NORMAL
ALBUMIN SERPL-MCNC: 3.4 G/DL (ref 3.5–5)
ALBUMIN SERPL-MCNC: 3.5 G/DL (ref 3.5–5)
ALBUMIN/GLOB SERPL: 1.2 {RATIO} (ref 1.1–2.2)
ALBUMIN/GLOB SERPL: 1.3 {RATIO} (ref 1.1–2.2)
ALP SERPL-CCNC: 79 U/L (ref 45–117)
ALP SERPL-CCNC: 80 U/L (ref 45–117)
ALT SERPL-CCNC: 28 U/L (ref 12–78)
ALT SERPL-CCNC: 29 U/L (ref 12–78)
ANION GAP SERPL CALC-SCNC: 7 MMOL/L (ref 5–15)
ANION GAP SERPL CALC-SCNC: 8 MMOL/L (ref 5–15)
APTT PPP: 72.3 SEC (ref 22.1–31)
AST SERPL-CCNC: 189 U/L (ref 15–37)
AST SERPL-CCNC: 206 U/L (ref 15–37)
ATRIAL RATE: 72 BPM
ATRIAL RATE: 91 BPM
BASE DEFICIT BLDV-SCNC: 5.1 MMOL/L
BDY SITE: ABNORMAL
BILIRUB SERPL-MCNC: 2.7 MG/DL (ref 0.2–1)
BILIRUB SERPL-MCNC: 4 MG/DL (ref 0.2–1)
BNP SERPL-MCNC: 5026 PG/ML
BNP SERPL-MCNC: 6740 PG/ML
BUN SERPL-MCNC: 20 MG/DL (ref 6–20)
BUN SERPL-MCNC: 22 MG/DL (ref 6–20)
BUN/CREAT SERPL: 18 (ref 12–20)
BUN/CREAT SERPL: 19 (ref 12–20)
CALCIUM SERPL-MCNC: 8.3 MG/DL (ref 8.5–10.1)
CALCIUM SERPL-MCNC: 8.5 MG/DL (ref 8.5–10.1)
CALCULATED R AXIS, ECG10: 104 DEGREES
CALCULATED R AXIS, ECG10: 28 DEGREES
CALCULATED T AXIS, ECG11: 110 DEGREES
CALCULATED T AXIS, ECG11: 56 DEGREES
CARB RATIO, CHOR: 15
CARBOHYDRATE EATEN, CHO: 20 G
CARBOHYDRATE EATEN, CHO: 49 G
CARBOHYDRATE EATEN, CHO: 50 G
CHLORIDE SERPL-SCNC: 104 MMOL/L (ref 97–108)
CHLORIDE SERPL-SCNC: 105 MMOL/L (ref 97–108)
CO2 SERPL-SCNC: 22 MMOL/L (ref 21–32)
CO2 SERPL-SCNC: 22 MMOL/L (ref 21–32)
CREAT SERPL-MCNC: 1.11 MG/DL (ref 0.7–1.3)
CREAT SERPL-MCNC: 1.13 MG/DL (ref 0.7–1.3)
D50 ADMINISTERED, D50ADM: 0 ML
D50 ADMINISTERED, D50ADM: 9 ML
D50 ORDER, D50ORD: 0 ML
D50 ORDER, D50ORD: 9 ML
DIAGNOSIS, 93000: NORMAL
DIAGNOSIS, 93000: NORMAL
ECHO AO ROOT DIAM: 3.8 CM
ECHO AO ROOT INDEX: 1.8 CM/M2
ECHO LA DIAMETER INDEX: 1.52 CM/M2
ECHO LA DIAMETER: 3.2 CM
ECHO LA TO AORTIC ROOT RATIO: 0.84
ECHO LV FRACTIONAL SHORTENING: 12 % (ref 28–44)
ECHO LV INTERNAL DIMENSION DIASTOLE INDEX: 2.7 CM/M2
ECHO LV INTERNAL DIMENSION DIASTOLIC: 5.7 CM (ref 4.2–5.9)
ECHO LV INTERNAL DIMENSION SYSTOLIC INDEX: 2.37 CM/M2
ECHO LV INTERNAL DIMENSION SYSTOLIC: 5 CM
ECHO LV IVSD: 1.1 CM (ref 0.6–1)
ECHO LV MASS 2D: 256.7 G (ref 88–224)
ECHO LV MASS INDEX 2D: 121.7 G/M2 (ref 49–115)
ECHO LV POSTERIOR WALL DIASTOLIC: 1.1 CM (ref 0.6–1)
ECHO LV RELATIVE WALL THICKNESS RATIO: 0.39
ECHO RV FREE WALL PEAK S': 6 CM/S
ECHO RV INTERNAL DIMENSION: 5.3 CM
ECHO RV TAPSE: 0.8 CM (ref 1.7–?)
ECHO TV REGURGITANT MAX VELOCITY: 2.52 M/S
ECHO TV REGURGITANT PEAK GRADIENT: 25 MMHG
ERYTHROCYTE [DISTWIDTH] IN BLOOD BY AUTOMATED COUNT: 25.3 % (ref 11.5–14.5)
GAS FLOW.O2 O2 DELIVERY SYS: 4 L/MIN
GLOBULIN SER CALC-MCNC: 2.6 G/DL (ref 2–4)
GLOBULIN SER CALC-MCNC: 2.8 G/DL (ref 2–4)
GLSCOM COMMENTS: NORMAL
GLUCOSE BLD STRIP.AUTO-MCNC: 106 MG/DL (ref 65–117)
GLUCOSE BLD STRIP.AUTO-MCNC: 107 MG/DL (ref 65–117)
GLUCOSE BLD STRIP.AUTO-MCNC: 108 MG/DL (ref 65–117)
GLUCOSE BLD STRIP.AUTO-MCNC: 121 MG/DL (ref 65–117)
GLUCOSE BLD STRIP.AUTO-MCNC: 127 MG/DL (ref 65–117)
GLUCOSE BLD STRIP.AUTO-MCNC: 128 MG/DL (ref 65–117)
GLUCOSE BLD STRIP.AUTO-MCNC: 131 MG/DL (ref 65–117)
GLUCOSE BLD STRIP.AUTO-MCNC: 136 MG/DL (ref 65–117)
GLUCOSE BLD STRIP.AUTO-MCNC: 136 MG/DL (ref 65–117)
GLUCOSE BLD STRIP.AUTO-MCNC: 152 MG/DL (ref 65–117)
GLUCOSE BLD STRIP.AUTO-MCNC: 158 MG/DL (ref 65–117)
GLUCOSE BLD STRIP.AUTO-MCNC: 159 MG/DL (ref 65–117)
GLUCOSE BLD STRIP.AUTO-MCNC: 165 MG/DL (ref 65–117)
GLUCOSE BLD STRIP.AUTO-MCNC: 172 MG/DL (ref 65–117)
GLUCOSE BLD STRIP.AUTO-MCNC: 180 MG/DL (ref 65–117)
GLUCOSE BLD STRIP.AUTO-MCNC: 190 MG/DL (ref 65–117)
GLUCOSE BLD STRIP.AUTO-MCNC: 196 MG/DL (ref 65–117)
GLUCOSE BLD STRIP.AUTO-MCNC: 229 MG/DL (ref 65–117)
GLUCOSE BLD STRIP.AUTO-MCNC: 78 MG/DL (ref 65–117)
GLUCOSE BLD STRIP.AUTO-MCNC: 83 MG/DL (ref 65–117)
GLUCOSE BLD STRIP.AUTO-MCNC: 99 MG/DL (ref 65–117)
GLUCOSE SERPL-MCNC: 145 MG/DL (ref 65–100)
GLUCOSE SERPL-MCNC: 206 MG/DL (ref 65–100)
GLUCOSE, GLC: 106 MG/DL
GLUCOSE, GLC: 107 MG/DL
GLUCOSE, GLC: 108 MG/DL
GLUCOSE, GLC: 121 MG/DL
GLUCOSE, GLC: 127 MG/DL
GLUCOSE, GLC: 128 MG/DL
GLUCOSE, GLC: 131 MG/DL
GLUCOSE, GLC: 136 MG/DL
GLUCOSE, GLC: 136 MG/DL
GLUCOSE, GLC: 152 MG/DL
GLUCOSE, GLC: 158 MG/DL
GLUCOSE, GLC: 159 MG/DL
GLUCOSE, GLC: 165 MG/DL
GLUCOSE, GLC: 172 MG/DL
GLUCOSE, GLC: 180 MG/DL
GLUCOSE, GLC: 190 MG/DL
GLUCOSE, GLC: 196 MG/DL
GLUCOSE, GLC: 229 MG/DL
GLUCOSE, GLC: 78 MG/DL
GLUCOSE, GLC: 83 MG/DL
GLUCOSE, GLC: 99 MG/DL
HCO3 BLDV-SCNC: 20 MMOL/L (ref 23–28)
HCT VFR BLD AUTO: 22.1 % (ref 36.6–50.3)
HCT VFR BLD AUTO: 23.5 % (ref 36.6–50.3)
HGB BLD-MCNC: 7 G/DL (ref 12.1–17)
HGB BLD-MCNC: 7.7 G/DL (ref 12.1–17)
HIGH TARGET, HITG: 130 MG/DL
HISTORY CHECKED?,CKHIST: NORMAL
HISTORY CHECKED?,CKHIST: NORMAL
INR PPP: 1.3 (ref 0.9–1.1)
INSULIN ADMINSTERED, INSADM: 0 UNITS/HOUR
INSULIN ADMINSTERED, INSADM: 1.3 UNITS/HOUR
INSULIN ADMINSTERED, INSADM: 1.4 UNITS/HOUR
INSULIN ADMINSTERED, INSADM: 1.5 UNITS/HOUR
INSULIN ADMINSTERED, INSADM: 11.7 UNITS/HOUR
INSULIN ADMINSTERED, INSADM: 2 UNITS/HOUR
INSULIN ADMINSTERED, INSADM: 2 UNITS/HOUR
INSULIN ADMINSTERED, INSADM: 2.3 UNITS/HOUR
INSULIN ADMINSTERED, INSADM: 2.7 UNITS/HOUR
INSULIN ADMINSTERED, INSADM: 4.2 UNITS/HOUR
INSULIN ADMINSTERED, INSADM: 4.7 UNITS/HOUR
INSULIN ADMINSTERED, INSADM: 5 UNITS/HOUR
INSULIN ADMINSTERED, INSADM: 5.1 UNITS/HOUR
INSULIN ADMINSTERED, INSADM: 6.1 UNITS/HOUR
INSULIN ADMINSTERED, INSADM: 6.7 UNITS/HOUR
INSULIN ADMINSTERED, INSADM: 6.9 UNITS/HOUR
INSULIN ADMINSTERED, INSADM: 6.9 UNITS/HOUR
INSULIN ADMINSTERED, INSADM: 7.8 UNITS/HOUR
INSULIN ADMINSTERED, INSADM: 9.2 UNITS/HOUR
INSULIN ADMINSTERED, INSADM: 9.6 UNITS/HOUR
INSULIN ADMINSTERED, INSADM: 9.6 UNITS/HOUR
INSULIN BOLUS ADMINISTERED, INSBOLADM: 1 UNITS/HOUR
INSULIN BOLUS ADMINISTERED, INSBOLADM: 3 UNITS/HOUR
INSULIN BOLUS ADMINISTERED, INSBOLADM: 3 UNITS/HOUR
INSULIN BOLUS ORDERED, INSBOLORD: 1.3 UNITS/HOUR
INSULIN BOLUS ORDERED, INSBOLORD: 3.3 UNITS/HOUR
INSULIN BOLUS ORDERED, INSBOLORD: 3.3 UNITS/HOUR
INSULIN ORDER, INSORD: 0 UNITS/HOUR
INSULIN ORDER, INSORD: 1.3 UNITS/HOUR
INSULIN ORDER, INSORD: 1.4 UNITS/HOUR
INSULIN ORDER, INSORD: 1.5 UNITS/HOUR
INSULIN ORDER, INSORD: 11.7 UNITS/HOUR
INSULIN ORDER, INSORD: 2 UNITS/HOUR
INSULIN ORDER, INSORD: 2 UNITS/HOUR
INSULIN ORDER, INSORD: 2.3 UNITS/HOUR
INSULIN ORDER, INSORD: 2.7 UNITS/HOUR
INSULIN ORDER, INSORD: 4.2 UNITS/HOUR
INSULIN ORDER, INSORD: 4.7 UNITS/HOUR
INSULIN ORDER, INSORD: 5 UNITS/HOUR
INSULIN ORDER, INSORD: 5.1 UNITS/HOUR
INSULIN ORDER, INSORD: 6.1 UNITS/HOUR
INSULIN ORDER, INSORD: 6.7 UNITS/HOUR
INSULIN ORDER, INSORD: 6.9 UNITS/HOUR
INSULIN ORDER, INSORD: 6.9 UNITS/HOUR
INSULIN ORDER, INSORD: 7.8 UNITS/HOUR
INSULIN ORDER, INSORD: 9.2 UNITS/HOUR
INSULIN ORDER, INSORD: 9.6 UNITS/HOUR
INSULIN ORDER, INSORD: 9.6 UNITS/HOUR
LACTATE SERPL-SCNC: 2.6 MMOL/L (ref 0.4–2)
LACTATE SERPL-SCNC: 2.9 MMOL/L (ref 0.4–2)
LDH SERPL L TO P-CCNC: 329 U/L (ref 85–241)
LOW TARGET, LOT: 95 MG/DL
MAGNESIUM SERPL-MCNC: 2 MG/DL (ref 1.6–2.4)
MCH RBC QN AUTO: 25.3 PG (ref 26–34)
MCHC RBC AUTO-ENTMCNC: 31.7 G/DL (ref 30–36.5)
MCV RBC AUTO: 79.8 FL (ref 80–99)
MINUTES UNTIL NEXT BG, NBG: 120 MIN
MINUTES UNTIL NEXT BG, NBG: 120 MIN
MINUTES UNTIL NEXT BG, NBG: 15 MIN
MINUTES UNTIL NEXT BG, NBG: 60 MIN
MULTIPLIER, MUL: 0.02
MULTIPLIER, MUL: 0.03
MULTIPLIER, MUL: 0.04
MULTIPLIER, MUL: 0.05
MULTIPLIER, MUL: 0.06
MULTIPLIER, MUL: 0.07
MULTIPLIER, MUL: 0.08
MULTIPLIER, MUL: 0.08
MULTIPLIER, MUL: 0.09
MULTIPLIER, MUL: 0.09
MULTIPLIER, MUL: 0.1
MULTIPLIER, MUL: 0.11
MULTIPLIER, MUL: 0.11
NRBC # BLD: 0 K/UL (ref 0–0.01)
NRBC BLD-RTO: 0 PER 100 WBC
ORDER INITIALS, ORDINIT: NORMAL
PCO2 BLDV: 37.8 MMHG (ref 41–51)
PH BLDV: 7.34 [PH] (ref 7.32–7.42)
PLATELET # BLD AUTO: 103 K/UL (ref 150–400)
PO2 BLDV: 25 MMHG (ref 25–40)
POTASSIUM SERPL-SCNC: 3.8 MMOL/L (ref 3.5–5.1)
POTASSIUM SERPL-SCNC: 4.3 MMOL/L (ref 3.5–5.1)
PROT SERPL-MCNC: 6.1 G/DL (ref 6.4–8.2)
PROT SERPL-MCNC: 6.2 G/DL (ref 6.4–8.2)
PROTHROMBIN TIME: 13.6 SEC (ref 9–11.1)
Q-T INTERVAL, ECG07: 600 MS
Q-T INTERVAL, ECG07: 720 MS
QRS DURATION, ECG06: 180 MS
QRS DURATION, ECG06: 180 MS
QTC CALCULATION (BEZET), ECG08: 841 MS
QTC CALCULATION (BEZET), ECG08: 852 MS
RBC # BLD AUTO: 2.77 M/UL (ref 4.1–5.7)
SAO2% DEVICE SAO2% SENSOR NAME: ABNORMAL
SERVICE CMNT-IMP: ABNORMAL
SERVICE CMNT-IMP: NORMAL
SODIUM SERPL-SCNC: 134 MMOL/L (ref 136–145)
SODIUM SERPL-SCNC: 134 MMOL/L (ref 136–145)
SPECIMEN SITE: ABNORMAL
THERAPEUTIC RANGE,PTTT: ABNORMAL SECS (ref 58–77)
VENTRICULAR RATE, ECG03: 121 BPM
VENTRICULAR RATE, ECG03: 82 BPM
WBC # BLD AUTO: 8.7 K/UL (ref 4.1–11.1)

## 2022-04-07 PROCEDURE — 74011250637 HC RX REV CODE- 250/637: Performed by: INTERNAL MEDICINE

## 2022-04-07 PROCEDURE — 86900 BLOOD TYPING SEROLOGIC ABO: CPT

## 2022-04-07 PROCEDURE — 85018 HEMOGLOBIN: CPT

## 2022-04-07 PROCEDURE — 82962 GLUCOSE BLOOD TEST: CPT

## 2022-04-07 PROCEDURE — 74011000250 HC RX REV CODE- 250: Performed by: INTERNAL MEDICINE

## 2022-04-07 PROCEDURE — 74011250636 HC RX REV CODE- 250/636: Performed by: THORACIC SURGERY (CARDIOTHORACIC VASCULAR SURGERY)

## 2022-04-07 PROCEDURE — 65610000003 HC RM ICU SURGICAL

## 2022-04-07 PROCEDURE — 74011636637 HC RX REV CODE- 636/637: Performed by: NURSE PRACTITIONER

## 2022-04-07 PROCEDURE — 71045 X-RAY EXAM CHEST 1 VIEW: CPT

## 2022-04-07 PROCEDURE — 80053 COMPREHEN METABOLIC PANEL: CPT

## 2022-04-07 PROCEDURE — 93750 INTERROGATION VAD IN PERSON: CPT | Performed by: INTERNAL MEDICINE

## 2022-04-07 PROCEDURE — 99231 SBSQ HOSP IP/OBS SF/LOW 25: CPT | Performed by: CLINICAL NURSE SPECIALIST

## 2022-04-07 PROCEDURE — 83615 LACTATE (LD) (LDH) ENZYME: CPT

## 2022-04-07 PROCEDURE — 85610 PROTHROMBIN TIME: CPT

## 2022-04-07 PROCEDURE — 86923 COMPATIBILITY TEST ELECTRIC: CPT

## 2022-04-07 PROCEDURE — 83880 ASSAY OF NATRIURETIC PEPTIDE: CPT

## 2022-04-07 PROCEDURE — 99292 CRITICAL CARE ADDL 30 MIN: CPT | Performed by: THORACIC SURGERY (CARDIOTHORACIC VASCULAR SURGERY)

## 2022-04-07 PROCEDURE — 83605 ASSAY OF LACTIC ACID: CPT

## 2022-04-07 PROCEDURE — 74011000250 HC RX REV CODE- 250: Performed by: THORACIC SURGERY (CARDIOTHORACIC VASCULAR SURGERY)

## 2022-04-07 PROCEDURE — 74011000250 HC RX REV CODE- 250: Performed by: NURSE PRACTITIONER

## 2022-04-07 PROCEDURE — 85027 COMPLETE CBC AUTOMATED: CPT

## 2022-04-07 PROCEDURE — 99291 CRITICAL CARE FIRST HOUR: CPT | Performed by: THORACIC SURGERY (CARDIOTHORACIC VASCULAR SURGERY)

## 2022-04-07 PROCEDURE — 82803 BLOOD GASES ANY COMBINATION: CPT

## 2022-04-07 PROCEDURE — 93306 TTE W/DOPPLER COMPLETE: CPT

## 2022-04-07 PROCEDURE — 74011250636 HC RX REV CODE- 250/636: Performed by: NURSE PRACTITIONER

## 2022-04-07 PROCEDURE — 74011000258 HC RX REV CODE- 258: Performed by: THORACIC SURGERY (CARDIOTHORACIC VASCULAR SURGERY)

## 2022-04-07 PROCEDURE — 74011250637 HC RX REV CODE- 250/637: Performed by: NURSE PRACTITIONER

## 2022-04-07 PROCEDURE — 85730 THROMBOPLASTIN TIME PARTIAL: CPT

## 2022-04-07 PROCEDURE — 30233N1 TRANSFUSION OF NONAUTOLOGOUS RED BLOOD CELLS INTO PERIPHERAL VEIN, PERCUTANEOUS APPROACH: ICD-10-PCS | Performed by: NURSE PRACTITIONER

## 2022-04-07 PROCEDURE — 36430 TRANSFUSION BLD/BLD COMPNT: CPT

## 2022-04-07 PROCEDURE — 99233 SBSQ HOSP IP/OBS HIGH 50: CPT | Performed by: INTERNAL MEDICINE

## 2022-04-07 PROCEDURE — P9016 RBC LEUKOCYTES REDUCED: HCPCS

## 2022-04-07 PROCEDURE — P9045 ALBUMIN (HUMAN), 5%, 250 ML: HCPCS | Performed by: NURSE PRACTITIONER

## 2022-04-07 PROCEDURE — 36415 COLL VENOUS BLD VENIPUNCTURE: CPT

## 2022-04-07 PROCEDURE — 74011000258 HC RX REV CODE- 258: Performed by: NURSE PRACTITIONER

## 2022-04-07 PROCEDURE — 93750 INTERROGATION VAD IN PERSON: CPT | Performed by: THORACIC SURGERY (CARDIOTHORACIC VASCULAR SURGERY)

## 2022-04-07 PROCEDURE — 83735 ASSAY OF MAGNESIUM: CPT

## 2022-04-07 RX ORDER — LANOLIN ALCOHOL/MO/W.PET/CERES
3 CREAM (GRAM) TOPICAL
Status: DISCONTINUED | OUTPATIENT
Start: 2022-04-07 | End: 2022-05-06 | Stop reason: HOSPADM

## 2022-04-07 RX ORDER — GUAIFENESIN 100 MG/5ML
81 LIQUID (ML) ORAL DAILY
Status: DISCONTINUED | OUTPATIENT
Start: 2022-04-07 | End: 2022-05-06 | Stop reason: HOSPADM

## 2022-04-07 RX ORDER — DIPHENHYDRAMINE HCL 25 MG
25 CAPSULE ORAL ONCE
Status: COMPLETED | OUTPATIENT
Start: 2022-04-07 | End: 2022-04-07

## 2022-04-07 RX ORDER — BUMETANIDE 0.25 MG/ML
1 INJECTION INTRAMUSCULAR; INTRAVENOUS ONCE
Status: COMPLETED | OUTPATIENT
Start: 2022-04-07 | End: 2022-04-07

## 2022-04-07 RX ORDER — HYDRALAZINE HYDROCHLORIDE 20 MG/ML
5 INJECTION INTRAMUSCULAR; INTRAVENOUS
Status: DISCONTINUED | OUTPATIENT
Start: 2022-04-07 | End: 2022-05-06 | Stop reason: HOSPADM

## 2022-04-07 RX ORDER — SODIUM CHLORIDE 9 MG/ML
250 INJECTION, SOLUTION INTRAVENOUS AS NEEDED
Status: DISCONTINUED | OUTPATIENT
Start: 2022-04-07 | End: 2022-04-11 | Stop reason: ALTCHOICE

## 2022-04-07 RX ORDER — FENTANYL 12.5 UG/1
1 PATCH TRANSDERMAL
Status: DISCONTINUED | OUTPATIENT
Start: 2022-04-07 | End: 2022-04-15

## 2022-04-07 RX ORDER — HYDRALAZINE HYDROCHLORIDE 20 MG/ML
10 INJECTION INTRAMUSCULAR; INTRAVENOUS
Status: DISCONTINUED | OUTPATIENT
Start: 2022-04-07 | End: 2022-05-06 | Stop reason: HOSPADM

## 2022-04-07 RX ORDER — HYDRALAZINE HYDROCHLORIDE 25 MG/1
25 TABLET, FILM COATED ORAL 4 TIMES DAILY
Status: DISCONTINUED | OUTPATIENT
Start: 2022-04-07 | End: 2022-04-08

## 2022-04-07 RX ORDER — POTASSIUM CHLORIDE 29.8 MG/ML
20 INJECTION INTRAVENOUS ONCE
Status: COMPLETED | OUTPATIENT
Start: 2022-04-07 | End: 2022-04-07

## 2022-04-07 RX ADMIN — CHLORHEXIDINE GLUCONATE 10 ML: 1.2 RINSE ORAL at 18:37

## 2022-04-07 RX ADMIN — Medication 3 MG: at 01:09

## 2022-04-07 RX ADMIN — NICARDIPINE HYDROCHLORIDE 12.5 MG/HR: 25 INJECTION, SOLUTION INTRAVENOUS at 12:29

## 2022-04-07 RX ADMIN — DIPHENHYDRAMINE HYDROCHLORIDE 25 MG: 25 CAPSULE ORAL at 03:11

## 2022-04-07 RX ADMIN — SUCRALFATE 1 G: 1 TABLET ORAL at 06:33

## 2022-04-07 RX ADMIN — FAMOTIDINE 20 MG: 10 INJECTION INTRAVENOUS at 08:46

## 2022-04-07 RX ADMIN — SUCRALFATE 1 G: 1 TABLET ORAL at 12:02

## 2022-04-07 RX ADMIN — FAMOTIDINE 20 MG: 10 INJECTION INTRAVENOUS at 20:27

## 2022-04-07 RX ADMIN — POTASSIUM CHLORIDE 20 MEQ: 29.8 INJECTION, SOLUTION INTRAVENOUS at 15:18

## 2022-04-07 RX ADMIN — ALBUMIN (HUMAN) 12.5 G: 12.5 INJECTION, SOLUTION INTRAVENOUS at 08:47

## 2022-04-07 RX ADMIN — NICARDIPINE HYDROCHLORIDE 10 MG/HR: 25 INJECTION, SOLUTION INTRAVENOUS at 16:15

## 2022-04-07 RX ADMIN — HYDRALAZINE HYDROCHLORIDE 25 MG: 25 TABLET, FILM COATED ORAL at 21:19

## 2022-04-07 RX ADMIN — DEXTROSE MONOHYDRATE 45 ML: 100 INJECTION, SOLUTION INTRAVENOUS at 21:45

## 2022-04-07 RX ADMIN — FENTANYL CITRATE 25 MCG: 0.05 INJECTION, SOLUTION INTRAMUSCULAR; INTRAVENOUS at 12:09

## 2022-04-07 RX ADMIN — FLUCONAZOLE 200 MG: 2 INJECTION, SOLUTION INTRAVENOUS at 05:28

## 2022-04-07 RX ADMIN — ASPIRIN 81 MG CHEWABLE TABLET 81 MG: 81 TABLET CHEWABLE at 10:02

## 2022-04-07 RX ADMIN — FENTANYL CITRATE 25 MCG: 0.05 INJECTION, SOLUTION INTRAMUSCULAR; INTRAVENOUS at 16:12

## 2022-04-07 RX ADMIN — MUPIROCIN: 20 OINTMENT TOPICAL at 08:50

## 2022-04-07 RX ADMIN — HYDRALAZINE HYDROCHLORIDE 25 MG: 25 TABLET, FILM COATED ORAL at 18:36

## 2022-04-07 RX ADMIN — FENTANYL CITRATE 25 MCG: 0.05 INJECTION, SOLUTION INTRAMUSCULAR; INTRAVENOUS at 00:50

## 2022-04-07 RX ADMIN — FENTANYL CITRATE 25 MCG: 0.05 INJECTION, SOLUTION INTRAMUSCULAR; INTRAVENOUS at 06:30

## 2022-04-07 RX ADMIN — SODIUM CHLORIDE, PRESERVATIVE FREE 10 ML: 5 INJECTION INTRAVENOUS at 15:04

## 2022-04-07 RX ADMIN — Medication 3 MG: at 21:36

## 2022-04-07 RX ADMIN — SENNOSIDES AND DOCUSATE SODIUM 1 TABLET: 50; 8.6 TABLET ORAL at 08:46

## 2022-04-07 RX ADMIN — AMIODARONE HYDROCHLORIDE 0.5 MG/MIN: 50 INJECTION, SOLUTION INTRAVENOUS at 06:03

## 2022-04-07 RX ADMIN — Medication 1 UNITS: at 09:51

## 2022-04-07 RX ADMIN — BUMETANIDE 1 MG: 0.25 INJECTION, SOLUTION INTRAMUSCULAR; INTRAVENOUS at 05:54

## 2022-04-07 RX ADMIN — HYDRALAZINE HYDROCHLORIDE 25 MG: 25 TABLET, FILM COATED ORAL at 09:37

## 2022-04-07 RX ADMIN — CHLORHEXIDINE GLUCONATE 10 ML: 1.2 RINSE ORAL at 08:50

## 2022-04-07 RX ADMIN — NICARDIPINE HYDROCHLORIDE 5 MG/HR: 25 INJECTION, SOLUTION INTRAVENOUS at 03:56

## 2022-04-07 RX ADMIN — Medication 1.3 UNITS/HR: at 02:26

## 2022-04-07 RX ADMIN — MUPIROCIN: 20 OINTMENT TOPICAL at 18:37

## 2022-04-07 RX ADMIN — SUCRALFATE 1 G: 1 TABLET ORAL at 16:12

## 2022-04-07 RX ADMIN — FENTANYL CITRATE 25 MCG: 0.05 INJECTION, SOLUTION INTRAMUSCULAR; INTRAVENOUS at 20:28

## 2022-04-07 RX ADMIN — EPINEPHRINE 2 MCG/MIN: 1 INJECTION INTRAMUSCULAR; INTRAVENOUS; SUBCUTANEOUS at 06:36

## 2022-04-07 RX ADMIN — SODIUM CHLORIDE, PRESERVATIVE FREE 10 ML: 5 INJECTION INTRAVENOUS at 06:00

## 2022-04-07 RX ADMIN — DOBUTAMINE HYDROCHLORIDE 6 MCG/KG/MIN: 400 INJECTION INTRAVENOUS at 06:11

## 2022-04-07 RX ADMIN — Medication 3 UNITS: at 18:36

## 2022-04-07 RX ADMIN — POLYETHYLENE GLYCOL 3350 17 G: 17 POWDER, FOR SOLUTION ORAL at 08:46

## 2022-04-07 RX ADMIN — ONDANSETRON HYDROCHLORIDE 4 MG: 2 SOLUTION INTRAMUSCULAR; INTRAVENOUS at 08:26

## 2022-04-07 RX ADMIN — HYDRALAZINE HYDROCHLORIDE 25 MG: 25 TABLET, FILM COATED ORAL at 12:09

## 2022-04-07 RX ADMIN — SODIUM CHLORIDE, PRESERVATIVE FREE 10 ML: 5 INJECTION INTRAVENOUS at 21:21

## 2022-04-07 RX ADMIN — LEVOFLOXACIN 250 MG: 5 INJECTION, SOLUTION INTRAVENOUS at 05:28

## 2022-04-07 RX ADMIN — NICARDIPINE HYDROCHLORIDE 10 MG/HR: 25 INJECTION, SOLUTION INTRAVENOUS at 21:32

## 2022-04-07 RX ADMIN — SUCRALFATE 1 G: 1 TABLET ORAL at 21:19

## 2022-04-07 RX ADMIN — FENTANYL CITRATE 25 MCG: 0.05 INJECTION, SOLUTION INTRAMUSCULAR; INTRAVENOUS at 08:46

## 2022-04-07 RX ADMIN — Medication 3 UNITS: at 12:09

## 2022-04-07 RX ADMIN — Medication 1.5 UNITS/HR: at 04:16

## 2022-04-07 RX ADMIN — Medication 6.1 UNITS/HR: at 16:07

## 2022-04-07 RX ADMIN — VANCOMYCIN HYDROCHLORIDE 1250 MG: 10 INJECTION, POWDER, LYOPHILIZED, FOR SOLUTION INTRAVENOUS at 04:19

## 2022-04-07 RX ADMIN — FENTANYL CITRATE 25 MCG: 0.05 INJECTION, SOLUTION INTRAMUSCULAR; INTRAVENOUS at 03:44

## 2022-04-07 RX ADMIN — SODIUM CHLORIDE 600 MG: 9 INJECTION, SOLUTION INTRAVENOUS at 03:28

## 2022-04-07 RX ADMIN — Medication 14 UNITS/KG/HR: at 04:27

## 2022-04-07 NOTE — PROGRESS NOTES
Spiritual Care Assessment/Progress Note  [facility]      NAME: Selina Truong      MRN: [id number]  AGE: 69 y.o.  SEX: male  Hoahaoism Affiliation: Methodist   Language: English     4/7/2022     Total Time (in minutes): 29     Spiritual Assessment begun in [facility] CV INTNSV CARE through conversation with:         [x]Patient        [] Family    [] Friend(s)        Reason for Consult: Initial/Spiritual assessment, critical care     Spiritual beliefs: (Please include comment if needed)     [x] Identifies with a fede tradition:Methodist         [] Supported by a fede community:            [] Claims no spiritual orientation:           [] Seeking spiritual identity:                [] Adheres to an individual form of spirituality:           [] Not able to assess:                           Identified resources for coping:      [x] Prayer                               [] Music                  [] Guided Imagery     [x] Family/friends                 [] Pet visits     [] Devotional reading                         [] Unknown     [] Other:                                               Interventions offered during this visit: (See comments for more details)    Patient Interventions: Affirmation of fede,Affirmation of emotions/emotional suffering,Coping skills reviewed/reinforced,Initial/Spiritual assessment, Critical care,Prayer (actual),Prayer (assurance of)           Plan of Care:     [x] Support spiritual and/or cultural needs    [] Support AMD and/or advance care planning process      [] Support grieving process   [] Coordinate Rites and/or Rituals    [] Coordination with community clergy   [] No spiritual needs identified at this time   [] Detailed Plan of Care below (See Comments)  [] Make referral to Music Therapy  [] Make referral to Pet Therapy     [] Make referral to Addiction services  [] Make referral to [facility]  [] Make referral to Spiritual Care Partner  [] No future visits requested        [] Contact Spiritual Care for further referrals     Comments: Visit for spiritual assessment in Room 4332. Isaías Riley was sitting up in bed eating alone. He shared that he was feeling pretty good after having surgery on yesterday. He is  and has 3 adult children. 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 Isaías Riley is a member of [employer] and prayer is his coping resource. Chart reviewed. Explored spiritual and emotional needs; provided empathic listening; and cultivated a relationship of care and support. Advised nurse to contact [facility] for any further referrals. Visited by: Ilia Gee.  Radha Groves, [address] [place] [address] Road paging Service [phone]

## 2022-04-07 NOTE — DIABETES MGMT
[facility]    CLINICAL NURSE SPECIALIST CONSULT     Initial Presentation   Pritesh Jiménez is a 69 y.o. male who was admitted 4/3/22 for medical optimization of severe ischemic cardiomyopathy with heart failure with reduced ejection fraction prior to LVAD implantation. HX:   Past Medical History:   Diagnosis Date    CAD (coronary artery disease) '90 '97, '13 x 2    MI, code ice in 2013 at [facility]    Calculus of kidney     Colonic polyps     Congestive heart failure, unspecified     Diabetes ([facility] [address]     Gastritis     Hypercholesterolemia     Sleep apnea         INITIAL DX:   HFrEF (heart failure with reduced ejection fraction) ([facility] [address] [I50.20]     Current Treatment     TX: Diuretic adjustment, antibiotics, milrinone    Consulted by Jorge Fenton NP for advanced diabetes nursing assessment and care for:   [x] Inpatient management strategy    Hospital Course   Clinical progress has been uncomplicated. 4/3: Admission  4/5: LVAD implant  4/6: Extubated  4/7: Lactate elevated, TTE- dilated LV cavity, RV with mod dysfunction, Increased LVAD speed. Increased LFTs with hepatic congestion   Diabetes History   Type 2 Diabetes- A1C 6.8%  Ambulatory BG management provided by: Jaleel Higginbotham MD PCP  Family history positive for diabetes: Maternal Grandmother with DM2       Diabetes-related Medical History  Neurological complications  Peripheral neuropathy  Macrovascular disease  CAD and Myocardial infarction  Other associated conditions     CHF, Sleep Apnea    Diabetes Medication History  Key Antihyperglycemic Medications             metFORMIN (GLUCOPHAGE) 500 mg tablet (Taking) Take 1 Tablet by mouth two (2) times daily (with meals). dapagliflozin (Farxiga) 10 mg tab tablet (Taking) Take 1 Tablet by mouth daily.            Diabetes self-management practices:   Eating pattern  [x]? Breakfast         Eggs, toast, oatmeal  [x]?         Lunch              Sometimes skips or piece of fruit  [x]? Dinner              Chicken/steak with a sweet potato or bowl of soup  [x]? Bedtime           Fruit  [x]? Snacks            Fruit  [x]? Beverages       Water  Physical activity pattern  Limited with HF  Monitoring pattern  Tests 2-3 times weekly  When testing, will see 106-135  Taking medications pattern  [x]? Consistent administration  [x]? Affordable  Social determinants of health impacting diabetes self-management practices   Concerned that you need to know more about how to stay healthy with diabetes  Overall evaluation:               [x]? Achieving A1c target with drug therapy & self-care practices    Subjective   I am ok.       On home inotropes since Nov  Objective   Physical exam  General           Normal weight male in acute post-op pain. Conversant and cooperative  Neuro  Alert, oriented, weak, tired  Vital Signs   Visit Vitals  BP (!) 89/61   Pulse 79   Temp 98.1 °F (36.7 °C)   Resp 25   Ht 6' 1\" (1.854 m)   Wt 86.2 kg (190 lb)   SpO2 96%   BMI 25.07 kg/m²     Skin  Warm and dry. No acanthosis noted along neckline. No lipohypertrophy or lipoatrophy noted at injection sites   Heart   Regular rate and rhythm.  No murmurs, rubs or gallops  Lungs  Clear to auscultation without rales or rhonchi  Extremities No foot wounds      Laboratory  Recent Labs     04/07/22  0316 04/06/22  0308 04/05/22 2016   * 115* 118*   AGAP 7 6 4*   WBC 8.7 5.9 6.5   CREA 1.11 0.94 0.97   GFRNA >60 >60 >60   * 140* 82*   ALT 28 17 17       Factors impacting BG management  Factor Dose Comments   Nutrition:  Standard meals       60 grams/meal        CHF On chronic milrinone Impaired insulin delivery         Blood glucose pattern      Significant diabetes-related events over the past 24-72 hours  A1C 6.8%  LVAD 4/5  Lac 2.6,   Full liq diet  On insulin, dobutamine, epi, heparin, cardene amio  On insulin gtt:   4/5: 21.8 units post-op  4/6: 25.8 units  4/7: 41.6 units since MN     Assessment and Plan   Nursing Diagnosis Risk for unstable blood glucose pattern   Nursing Intervention Domain 9584 Decision-making Support   Nursing Interventions Examined current inpatient diabetes/blood glucose control   Explored factors facilitating and impeding inpatient management  Explored corrective strategies with patient and responsible inpatient provider   Informed patient of rational for insulin strategy while hospitalized     Evaluation   Evelio Tobin is a 69year old gentleman, with controlled Type 2 Diabetes, admitted for medical optimization for severe ischemic cardiomyopathy and heart failure with reduced ejection fraction prior to LVAD implant. A1C on admission was 6.8% and BG was in goal without the need for anti-hyperglycemic agents pre-op. Following LVAD implant, he was started on an insulin gtt which has maintained glucose in goal but with elevated insulin rates in the setting of stress hyperglycemia, increased hepatic congestion and acidosis. Please continue current therapy. Recommendations   1. Insulin gtt per CTS protocol    2. Diet advancement per primary team, when advanced, include consistent carbohydrate portion of diet (60 grams CHO/meal)- can be added to clear/full liq diets    3. Do use imbedded 1:15 humalog carb coverage order when taking PO    Billing Code(s)   [x] 49233    Before making these care recommendations, I personally reviewed the hospitalization record, including notes, laboratory & diagnostic data and current medications, and examined the patient at the bedside (circumstances permitting) before making care recommendations. More than fifty (50) percent of the time was spent in patient counseling and/or care coordination.   Total minutes: 13      FAROOQ Germain  Diabetes Clinical Nurse Specialist  [facility]  Access via [employer]

## 2022-04-07 NOTE — PROGRESS NOTES
[facility] in [place], [place]    Briefly met with patient and wife Nabil Fink. Patient stated he has been doing well, but is very tired as he was unable to sleep last night. Time given to ask questions. Patient and wife had no further questions. Will continue to follow for ongoing LVAD education and support. Jayson Wilcox RN.

## 2022-04-07 NOTE — PROGRESS NOTES
1945: Bedside and Verbal shift change report given to Salima Alonzo RN (oncoming nurse) by KIRK Ochoa (offgoing nurse). Report included the following information SBAR, Intake/Output, MAR, Recent Results and Cardiac Rhythm SA. Gtts: Heparin 14/ Epi 2/ Dobut 7/ Amio 0.5    2010: Pt's LVAD low flow alarm x1. MAP 71-73; pump flow 1.6; PI 11.1; resolved with no intervention. Pump flow returned to 2.8-3.0     2030: Pt's LVAD low flow alarm x1. MAP 76; pump flow 2.3; PI 9.5; resolved with no intervention. Pump flow returned to 2.8-2.9     2130: PTT therapeutic x1 at 70.5; no rate change per protocol     2258: Pt's LVAD low flow alarm x1. MAP 75-79; low flow 2.3-2.4; resolved with no intervention    2300: Pump flows consistently at 2.4-2.6; HF paged of low pump flows and high MAPs; orders received from Dr. Blas Saavedra to restart cardene gtt    2304: Cardene gtt restarted at 5 mg/hr; Maps now between 68-72    0100: Pt having trouble sleeping;  Intensivist notified; orders received for melatonin      0109: CI 2.2-2.4; MAP 70-72; Dobutamine dose weaned to 6 mcg/kg/min     0229: CI 2.2-2.4; MAP 70-74; dobutamine dose weaned to 5 mcg/kg/min    0249: Pt still awake even after melatonin given; intensivist notified; orders received for PO Benadryl      0312: Pt's LVAD low flow alarm x3 w/in 15 min; MAP 70-72; Dobutamine increased back up to 6 mcg/kg/min; low flow alarms resolved     0316: PTT therapeutic x2 at 72.3; no rate change per protocol     0402: Pt's lactic 2.6; intensivist notified; no orders received     0520: Pt morning labs reviewed; Hgb 7; Pt having more frequent low flow alarms; HF paged; orders received from Dr. Angelo Abebe to transfuse 1 unit PRBCs; give 1 mg IV Bumex; and increase cardene as needed to lower MAPs    0552: Pt's MAP 74-78; Cardene increased to 7.5 mg/hr     0715: Pt's MAP 75-80; Cardene increased to 10 mg/hr     0724: Pt's MAP 75-80; Cardene increased to 12.5 mg/hr     0735: Dr. Matthew Schwarz at bedside; orders received for STAT Echo     Gtts: Heparin 14/ Cardene 12.5/ Epi 2/ Dobut 6/ Amio 0.5    0745: Bedside and Verbal shift change report given to [facility] RN (oncoming nurse) by Ilana Renee RN (offgoing nurse).  Report included the following information SBAR, Intake/Output, MAR, Recent Results and Cardiac Rhythm SA.

## 2022-04-07 NOTE — PROGRESS NOTES
1832 - Chart reviewed. Referral received. OT/PT spoke with RN who reported pt with several medical issues this AM (low flows, blood transfusion, pain control, nausea/vomiting). Will follow.

## 2022-04-07 NOTE — PROGRESS NOTES
0800: Bedside and Verbal shift change report given to Swetha BRADLEY (oncoming nurse) by [facility] RN (offgoing nurse). Report included the following information SBAR and Cardiac Rhythm Sinus Arrhythmia w/ PVCs & PACs. 0815: Dr Chacorta Hawley and echo tech at bedside - pump speeds increased to 4800.    0820: Blood transfusion started    0900: 1 episode of vomiting - 200mL clear emesis    1000: HF team at bedside - updated on inadequate pain management and frequent brief low flows (2-2.2). Epi and Dobut to stay at current rates to assist RV. Will redraw labs this afternoon. 1730: Driveline dressing changed - wife unable to stay for education d/t meeting this afternoon. 2000: Bedside and Verbal shift change report given to [facility] (oncoming nurse) by [employer] (offgoing nurse). Report included the following information SBAR and Cardiac Rhythm Sinus Rhythm w/ frequent PACs.

## 2022-04-07 NOTE — PROGRESS NOTES
Occupational Therapy  4/7/2022    Chart reviewed. Referral received. OT/PT spoke with RN who reported pt with several medical issues this AM (low flows, blood transfusion, pain control, nausea/vomiting). Will follow.  Vanessa Chua MS, OTR/L

## 2022-04-07 NOTE — PROGRESS NOTES
Problem: Falls - Risk of  Goal: *Absence of Falls  Description: Document Shauna Shannon Fall Risk and appropriate interventions in the flowsheet. Outcome: Progressing Towards Goal  Note: Fall Risk Interventions:  Mobility Interventions: Patient to call before getting OOB         Medication Interventions: Evaluate medications/consider consulting pharmacy    Elimination Interventions: Toileting schedule/hourly rounds              Problem: Patient Education: Go to Patient Education Activity  Goal: Patient/Family Education  Outcome: Progressing Towards Goal     Problem: Diabetes Self-Management  Goal: *Disease process and treatment process  Description: Define diabetes and identify own type of diabetes; list 3 options for treating diabetes. Outcome: Progressing Towards Goal  Goal: *Incorporating nutritional management into lifestyle  Description: Describe effect of type, amount and timing of food on blood glucose; list 3 methods for planning meals. Outcome: Progressing Towards Goal  Goal: *Incorporating physical activity into lifestyle  Description: State effect of exercise on blood glucose levels. Outcome: Progressing Towards Goal  Goal: *Developing strategies to promote health/change behavior  Description: Define the ABC's of diabetes; identify appropriate screenings, schedule and personal plan for screenings. Outcome: Progressing Towards Goal  Goal: *Using medications safely  Description: State effect of diabetes medications on diabetes; name diabetes medication taking, action and side effects. Outcome: Progressing Towards Goal  Goal: *Monitoring blood glucose, interpreting and using results  Description: Identify recommended blood glucose targets  and personal targets.   Outcome: Progressing Towards Goal  Goal: *Prevention, detection, treatment of acute complications  Description: List symptoms of hyper- and hypoglycemia; describe how to treat low blood sugar and actions for lowering  high blood glucose level.  Outcome: Progressing Towards Goal  Goal: *Prevention, detection and treatment of chronic complications  Description: Define the natural course of diabetes and describe the relationship of blood glucose levels to long term complications of diabetes. Outcome: Progressing Towards Goal  Goal: *Developing strategies to address psychosocial issues  Description: Describe feelings about living with diabetes; identify support needed and support network  Outcome: Progressing Towards Goal  Goal: *Insulin pump training  Outcome: Progressing Towards Goal  Goal: *Sick day guidelines  Outcome: Progressing Towards Goal  Goal: *Patient Specific Goal (EDIT GOAL, INSERT TEXT)  Outcome: Progressing Towards Goal     Problem: Patient Education: Go to Patient Education Activity  Goal: Patient/Family Education  Outcome: Progressing Towards Goal     Problem: Patient Education: Go to Patient Education Activity  Goal: Patient/Family Education  Outcome: Progressing Towards Goal     Problem: Pressure Injury - Risk of  Goal: *Prevention of pressure injury  Description: Document Jl Scale and appropriate interventions in the flowsheet.   Outcome: Progressing Towards Goal  Note: Pressure Injury Interventions:  Sensory Interventions: Assess changes in LOC,Assess need for specialty bed         Activity Interventions: Pressure redistribution bed/mattress(bed type),PT/OT evaluation    Mobility Interventions: Pressure redistribution bed/mattress (bed type),PT/OT evaluation    Nutrition Interventions: Document food/fluid/supplement intake,Discuss nutritional consult with provider    Friction and Shear Interventions: Feet elevated on foot rest,HOB 30 degrees or less,Lift team/patient mobility team                Problem: Patient Education: Go to Patient Education Activity  Goal: Patient/Family Education  Outcome: Progressing Towards Goal

## 2022-04-07 NOTE — PROGRESS NOTES
[address] [facility] in [address] [facility], [address] [facility]  Heart Failure History and Physical    Patient name: Nette Peterson  Patient : 1945  Patient MRN: [id number]  Date of service: 22    CHIEF COMPLAINT:  HFrEF     PLAN OF CARE:   · Severe ischemic cardiomyopathy, LVEF 15-20%; stage D, NYHA class IV symptoms; s/p LVAD implant with HM3 as DT  (22)  · Was re-do sternotomy, bleeding intra-op, required cryo, k-centra, FFP, Plt, and cellsaver in OR  · RV dysfunction noted intra-op; requiring lower VAD speed and dual inotrope. RECOMMENDATIONS:   POD # 2 from LVAD Implant  Increase LVAD speed to 4800rpms per Dr. Mare Hardy  Pulmonary toilet  Continue dobut, do not titrate today  Continue Epi at 2, do not wean at this time  Continue cardene gtt for afterload reduction  Start hydralazine 25mg PO QID  PRN IV hydralazine 5 or 10mg Q6hrs   Goal MAP 65-75 due to pt being sensitive to higher afterload  albumin 5%, 500mL BID today  1 PRBC this morning  No GDMT d/t recent surgery  Heparin gtt per protocol  ASA 81mg daily  Driveline dressing changes daily for now  Add Fentanyl patch for better pain control (pt with allergy to oxycodone)  Continue bowel regimen  SSI   CPAP QHS  Advanced care plan forms on file   Strict I/O, daily weights, Na+ restricted diet   Continue VAD education     IMPRESSION:  Fatigue at rest  Shortness of breath with exertion  S/p LVAD implant as DT  chronic systolic heart failure  · Stage D, NYHA class IV symptoms improved to class II on inotropes  · Non-ischemic cardiomyopathy, LVEF 20% with LVEDD 6.2  · RV dysfunction, TAPSE 1.22 (prelimnary read)  · TBili 1.5 likely from cardiac congestion  At risk of sudden cardiac death  · Recent cardiac arrest   · S/p ICD (2013, [address] followed by [facility]);  New implant on 10/21/2021 [employer]  Coronary artery disease  · S/p multiple interventions  · S/p 4V CABG (2012)  · LHC (2019) severe stenosis of LIMA to LAD anastomosis site, SVG graft to OM is occluded, SVG graft to RCA 40-50%, LAD occluded proximally, severe proximal LCx, RCA is the long . · PET (6/2019) EF 24% with anterior lateral and inferior reversible defect  Cardiac risk factors  · HTN  · HL  · DM2  · SRUTHI on CPAP  · MIld carotid stenosis  Anemia, microcytic  · Iron deficiency  DVT with filter  Pulmonary nodules   Dysphagia     CARDIAC IMAGING:  Echo (3/2/22)   · LV 10-15%  · Mod-severe MR     Echo (11/23/21):  · LV 15-20%. · Mod-severe, MR, mod-TR    Echo (5/20/21)  · LV: Estimated LVEF is 20 - 25%. Normal wall thickness. Mildly dilated left ventricle. Severely and globally reduced systolic function. Severe (grade 4) left ventricular diastolic dysfunction. · LA: Severely dilated left atrium. · RA: Severely dilated right atrium. · MV: Moderate mitral valve regurgitation is present. · TV: Moderate tricuspid valve regurgitation is present. · AO: Mild aortic root dilatation. · RV: Pacer/ICD present. · LVED 6.2cm    EKG (5/20/21) SB with 1degree AV block, QRS 116ms     LHC (5/24/21) Native Coronaries: LM - moderate to severe distal disease 50%. % prox occluded (), heavily calcified, LCx: 80% proximal stenosis. OM1 is  (seen filling faintly via collaterals). OM2 99% stenosis. RCA: 100% proximal occluded.  LIMA to LAD: patent, supplies only a diagonal branch (probably D2).  The LAD distal to the bifurcation is 100% occluded () and fills via right to left collaterals off the SVG to RCA. SVG to R-PDA: patent with moderate diffuse irregularities but no obstructive disease in the graft.    No appealing interventional targets.      NST as above     ICD Interrogation (11/12/2021) [employer] VVI, thoracic impedence trending up, no events, normal device function and good battery  ICD interrogation (5/12/21) [employer] single lead, no events, normal device function and good battery     HEMODYNAMICS:  [facility] 5/24/21 CI 1.97, PA 33/9/17, RA 1, PCWP 6- off milrinone   CPEST not done     Patient underwent a 6 minute walk test 11/12/2021    6 Min Walk Report 11/12/2021 7/6/2021 5/20/2021   (PRE) HR 88 98 74   (PRE) O2 Sat 100 - 99   (POST)  - 81   (POST) O2 Sat 99 98% on Ra 99   Distance in Meters 386.58 - 1158.24         OTHER IMAGING:  CXR (6/17/21) clear  CT 5/21/21 1. Irregular pulmonary nodule in the left upper lobe measuring 2 cm, suspicious for primary pulmonary malignancy. 2. Additional 6 mm left upper lobe pulmonary nodule. 3. Severe calcific atherosclerosis of the coronary arteries and abdominal aorta. No aneurysm. 4. Cardiomegaly. 5. No acute abnormality within the chest, abdomen, or pelvis. HISTORY OF PRESENT ILLNESS:  Briefly, Heidy Mccarthy is a 69 y.o. male with h/o HTN, HL, DM2, SRUTHI on CPAP, chronic systolic heart failure, stage D, NYHA class IV symptoms, non-ischemic cardiomyopathy, LVEF 20% with LVEDD 6.2, RV dysfunciton, TAPSE 1.22, TBili 1.5 likely from cardiac congestion, recent cardiac arrest s/p ICD (1/2013, [employer] followed by Yesenia Pratt), coronary artery disease s/p multiple interventions s/p 4V CABG (8/2012), [facility] (7/2019) severe stenosis of LIMA to LAD anastomosis site, SVG graft to OM is occluded, SVG graft to RCA 40-50%, LAD occluded proximally, severe proximal LCx, RCA is the long . PET (6/2019) EF 24% with anterior lateral and inferior reversible defect. Anemia, microcytic with iron deficiency, DVT with filter.     Patient was referred to [facility] by Dr. Damaris Herron for evaluation of his candidacy for advanced therapies.     Patient agreed to inpatient initiation of palliative infusion of chronic inotropes and evaluation for LVAD-DT. Patient was admitted 5/2-5/25/21. Patient was started on milrinone infusion and had first part of LVAD evaluation completed.  Right and left heart cath on 5/24/21 that showed severe diffuse native vessel CAD, with patent grafts (LIMA to D2, SVG to RCA). Antiplatelet therapy was held during hospitalization and after discharge due to anticipated pulmonary nodule biopsy, bone marrow biopsy and EGD/C-Scope as part of LVAD workup.     Patient was readmitted 5/26-5/28/21 with hypertension, headache and chest pain, his troponin peaked at 10; he was shortly bridged with heparin, otherwise had normal EKG and chest CT negative for PE. Cardiology and AHF service was consulted and patient was discharged home after troponin came down.     Patient has now completed radiation treatment for adenocarcinoma of the lung. Hem-onc has cleared patient for VAD implant with 90% 2 year prognosis. He was most recently admitted for elective pre-op EGD and colonoscopy which he tolerated well; path negative for malignancy. He presents today for elective admission to [facility] for hemodynamic optimization prior to LVAD implant. REVIEW OF SYSTEMS:  Review of Systems   Unable to perform ROS: Intubated   Constitutional: Negative for chills and fever. Respiratory: Negative for shortness of breath. Gastrointestinal: Negative for heartburn, nausea and vomiting. Neurological: Negative for dizziness. PHYSICAL EXAM:  Visit Vitals  BP (!) 89/61   Pulse 87   Temp 98.1 °F (36.7 °C)   Resp 24   Ht 6' 1\" (1.854 m)   Wt 190 lb (86.2 kg)   SpO2 98%   BMI 25.07 kg/m²     Physical Exam  Constitutional:       General: He is not in acute distress. Cardiovascular:      Rate and Rhythm: Normal rate and regular rhythm. Pulmonary:      Effort: No respiratory distress. Musculoskeletal:      Right lower leg: No edema. Left lower leg: No edema. Neurological:      General: No focal deficit present. Mental Status: He is oriented to person, place, and time. Psychiatric:         Mood and Affect: Mood normal.         Behavior: Behavior normal.         Thought Content:  Thought content normal.         Judgment: Judgment normal.          PAST MEDICAL HISTORY:  Past Medical History: Diagnosis Date    CAD (coronary artery disease) '90 '97, '13 x 2    MI, code ice in 2013 at [facility]    Calculus of kidney     Colonic polyps     Congestive heart failure, unspecified     Diabetes ([facility] [address]     Gastritis     Hypercholesterolemia     Sleep apnea        PAST SURGICAL HISTORY:  Past Surgical History:   Procedure Laterality Date    COLONOSCOPY  04/06/2011    16, due 21    COLONOSCOPY N/A 3/2/2022    COLONOSCOPY    :- performed by Sienna Odonnell MD at [facility]. [address], COLON, DIAGNOSTIC      11, due 16    HX CORONARY ARTERY BYPASS GRAFT  8/22/12    x 4 vessel by MISSY Ramirez    HX HEENT      LASIK    HX PACEMAKER PLACEMENT  1/30/13    [place] CARDIAC SURG PROCEDURE UNLIST  2012    x 4 vessels       FAMILY HISTORY:  Family History   Problem Relation Age of Onset    Heart Disease Mother         MI    Heart Disease Father         CAD & PVD    Lung Disease Father     Cancer Father         lung    Diabetes Maternal Grandmother     Heart Disease Other         CAD    Stroke Sister        SOCIAL HISTORY:  Social History     Socioeconomic History    Marital status:    Tobacco Use    Smoking status: Never Smoker    Smokeless tobacco: Never Used   Vaping Use    Vaping Use: Never used   Substance and Sexual Activity    Alcohol use: Yes     Alcohol/week: 0.0 standard drinks     Comment:  VERY RARE    Drug use: No       LABORATORY RESULTS:  Labs Latest Ref Rng & Units 4/7/2022 4/6/2022 4/5/2022 4/5/2022 4/5/2022 4/5/2022 4/5/2022   WBC 4.1 - 11.1 K/uL 8.7 5.9 6.5 6.5 - - 3. 5(L)   RBC 4.10 - 5.70 M/uL 2.77(L) 3.22(L) 3.26(L) 2.95(L) - - 4.82   Hemoglobin 12.1 - 17.0 g/dL 7. 0(L) 8.0(L) 8. 1(L) 7. 5(L) - - 11. 9(L)   Hematocrit 36.6 - 50.3 % 22. 1(L) 25. 0(L) 25. 3(L) 23. 2(L) - - 37.4   MCV 80.0 - 99.0 FL 79. 8(L) 77. 6(L) 77. 6(L) 78. 6(L) - - 77. 6(L)   Platelets 366 - 395 K/uL 103(L) 149(L) 129(L) 113(L) 134(L) 109(L) 119(L)   Lymphocytes 12 - 49 % - - 2(L) 5(L) - - -   Monocytes 5 - 13 % - - 2(L) 2(L) - - -   Eosinophils 0 - 7 % - - 0 0 - - -   Basophils 0 - 1 % - - 0 0 - - -   Bands 0 - 6 % - - 3 3 - - -   Albumin 3.5 - 5.0 g/dL 3.5 3. 3(L) 3.4(L) 3. 1(L) - - 3. 2(L)   Calcium 8.5 - 10.1 MG/DL 8.5 8.5 8.3(L) 7. 9(L) - - 9.0   Glucose 65 - 100 mg/dL 145(H) 115(H) 118(H) 99 - - 115(H)   BUN 6 - 20 MG/DL 20 14 14 15 - - 17   Creatinine 0.70 - 1.30 MG/DL 1.11 0.94 0.97 0.96 - - 0.91   Sodium 136 - 145 mmol/L 134(L) 137 138 140 - - 133(L)   Potassium 3.5 - 5.1 mmol/L 4.3 4.1 4.1 3.4(L) - - 4.6   TSH 0.36 - 3.74 uIU/mL - - - - - - -   LDH 85 - 241 U/L 329(H) 308(H) - 232 - - -   Some recent data might be hidden       ALLERGY:  Allergies   Allergen Reactions    Oxycodone Anaphylaxis    Pcn [Penicillins] Hives        CURRENT MEDICATIONS:    Current Facility-Administered Medications:     melatonin tablet 3 mg, 3 mg, Oral, QHS PRN, Winston Avendano MD, 3 mg at 04/07/22 0109    0.9% sodium chloride infusion 250 mL, 250 mL, IntraVENous, PRN, Darryl Tan MD    0.9% sodium chloride infusion 250 mL, 250 mL, IntraVENous, PRN, Darryl Tan MD    hydrALAZINE (APRESOLINE) tablet 25 mg, 25 mg, Oral, QID, Thor Lena B, NP, 25 mg at 04/07/22 1209    hydrALAZINE (APRESOLINE) 20 mg/mL injection 10 mg, 10 mg, IntraVENous, Q6H PRN, Thor Lena B, NP    aspirin chewable tablet 81 mg, 81 mg, Oral, DAILY, Thor Lena B, NP, 81 mg at 04/07/22 1002    [Held by provider] albumin human 5% (BUMINATE) solution 25 g, 25 g, IntraVENous, Q12H, Thor Lena B, NP, 12.5 g at 04/07/22 0847    sodium chloride (NS) flush 5-40 mL, 5-40 mL, IntraVENous, Q8H, Henryd, Venecia Kathryn MONTERO, NP, 10 mL at 04/07/22 0600    sodium chloride (NS) flush 5-40 mL, 5-40 mL, IntraVENous, PRN, Mirian Wong, NP    0.9% sodium chloride infusion, 9 mL/hr, IntraVENous, CONTINUOUS, Venecia Wong, NP, Last Rate: 9 mL/hr at 04/07/22 0800, 9 mL/hr at 04/07/22 0800    acetaminophen (TYLENOL) tablet 650 mg, 650 mg, Oral, Q6H PRN, Shaan Gaitan NP, 650 mg at 04/06/22 0205    naloxone [facility]) injection 0.4 mg, 0.4 mg, IntraVENous, PRN, Zuri Wong NP    mupirocin (BACTROBAN) 2 % ointment, , Both Nostrils, BID, Zuri Wong NP, Given at 04/07/22 0850    ondansetron (ZOFRAN) injection 4 mg, 4 mg, IntraVENous, Q4H PRN, Jeffy Wong NP, 4 mg at 04/07/22 0826    albuterol-ipratropium (DUO-NEB) 2.5 MG-0.5 MG/3 ML, 3 mL, Nebulization, Q6H PRN, Zuri Wong NP    midazolam (VERSED) injection 1 mg, 1 mg, IntraVENous, Q1H PRN, Zuri Wong NP    chlorhexidine (PERIDEX) 0.12 % mouthwash 10 mL, 10 mL, Oral, BID, Jeffy Wong NP, 10 mL at 04/07/22 0850    senna-docusate (PERICOLACE) 8.6-50 mg per tablet 1 Tablet, 1 Tablet, Oral, DAILY, Zuri Wong NP, 1 Tablet at 04/07/22 0846    polyethylene glycol (MIRALAX) packet 17 g, 17 g, Oral, DAILY, Jeffy Wong NP, 17 g at 04/07/22 0846    ELECTROLYTE REPLACEMENT NOTE: Nurse to review Serum Potassium and Magnesuim levels and Initiate Electrolyte Replacement Protocol as needed, 1 Each, Other, PRN, Zuri Wong NP    insulin regular (NOVOLIN R, HUMULIN R) 100 Units in 0.9% sodium chloride 100 mL infusion, 0-50 Units/hr, IntraVENous, TITRATE, Jeffy Wong NP, Last Rate: 9.6 mL/hr at 04/07/22 1158, 9.6 Units/hr at 04/07/22 1158    glucose chewable tablet 16 g, 4 Tablet, Oral, PRN, Zuri Wong NP    glucagon (GLUCAGEN) injection 1 mg, 1 mg, IntraMUSCular, PRN, Zuri Wong, NP    insulin lispro (HUMALOG) injection, , SubCUTAneous, TIDAC, Marvin, Zuri Segura, EARNESTINE, 3 Units at 04/07/22 1209    insulin lispro (HUMALOG) injection, , SubCUTAneous, AC&HS, Marvin, Zuri Segura, NP    dextrose 10 % infusion 0-250 mL, 0-250 mL, IntraVENous, PRN, Zuri Wong, NP    sucralfate (CARAFATE) tablet 1 g, 1 g, Oral, AC&HS, Marvin, Jeffy Saunders T, NP, 1 g at 04/07/22 1202    famotidine (PF) (PEPCID) 20 mg in 0.9% sodium chloride 10 mL injection, 20 mg, IntraVENous, Q12H, Duke Wong NP, 20 mg at 04/07/22 0846    0.45% sodium chloride infusion, 10 mL/hr, IntraVENous, CONTINUOUS, Leonid Najera MD, Last Rate: 10 mL/hr at 04/07/22 0800, 10 mL/hr at 04/07/22 0800    niCARdipine (CARDENE) 50 mg in 0.9% sodium chloride 100 mL infusion, 0-15 mg/hr, IntraVENous, TITRATE, Leonid Najera MD, Last Rate: 25 mL/hr at 04/07/22 1229, 12.5 mg/hr at 04/07/22 1229    heparin 25,000 units in  ml infusion, 400 Units/hr, IntraVENous, CONTINUOUS, Duke Wong NP, Last Rate: 4 mL/hr at 04/06/22 0013, 400 Units/hr at 04/06/22 0013    heparin 25,000 units in  ml infusion, 12-25 Units/kg/hr, IntraVENous, TITRATE, Duke Wong NP, Last Rate: 10.9 mL/hr at 04/07/22 0804, 14 Units/kg/hr at 04/07/22 0804    propofol (DIPRIVAN) 10 mg/mL infusion, 0-50 mcg/kg/min, IntraVENous, RIGO, Carolina Wong NP, Stopped at 04/06/22 1228    fentaNYL citrate (PF) injection 25 mcg, 25 mcg, IntraVENous, Q2H PRN, Duke Wong NP, 25 mcg at 04/07/22 1209    DOBUTamine (DOBUTREX) 1,000 mg/250 mL (4,000 mcg/mL) infusion, 0-10 mcg/kg/min, IntraVENous, TITRWTIN, Duke Wong NP, Last Rate: 6.8 mL/hr at 04/07/22 0801, 6 mcg/kg/min at 04/07/22 0801    amiodarone (CORDARONE) 900 mg/250 ml D5W infusion, 0.5-1 mg/min, IntraVENous, TITRATE, Duke Wong NP, Last Rate: 8.3 mL/hr at 04/07/22 0801, 0.5 mg/min at 04/07/22 0801    dexmedeTOMidine in 0.9 % NaCl (PRECEDEX) 400 mcg/100 mL (4 mcg/mL) infusion soln, 0.1-1.5 mcg/kg/hr, IntraVENous, TITRATE, Duke Wong, NP, Stopped at 04/06/22 1351    EPINEPHrine (ADRENALIN) 10 mg in 0.9% sodium chloride 250 mL infusion, 0-10 mcg/min, IntraVENous, TITRATE, Duke Wong, NP, Last Rate: 3 mL/hr at 04/07/22 0803, 2 mcg/min at 04/07/22 0803    NOREPINephrine (LEVOPHED) 32,000 mcg in dextrose 5% 250 mL (128 mcg/mL) infusion, 0.5-16 mcg/min, IntraVENous, TITRATE, Carolina Wong NP, Stopped at 04/05/22 1426    PHENYLephrine (MAINOR-SYNEPHRINE) 100 mg in 0.9% sodium chloride 250 mL infusion,  mcg/min, IntraVENous, TITRATE, Pernell Wong NP    vasopressin (VASOSTRICT) 20 Units in 0.9% sodium chloride 100 mL infusion, 0-0.04 Units/min, IntraVENous, TITRATE, Pernell Wong NP, Stopped at 04/05/22 1350      Thank you for your referral and allowing me to participate in this patient's care.     Luan Hwang NP  [address], [address]  [address]  Office [phone]  Fax [phone]

## 2022-04-07 NOTE — PROGRESS NOTES
[facility]    ICU TEAM Progress Note    Name: Neha [facility]   : 1945   MRN: [id number]   Date: 2022           ICU Assessment     Cardiogenic Shock  Ischemic Cardiomyopathy  Right Ventricular Dysfunction  Post LVAD         ICU Comprehensive Plan of Care: This is a 55-year-old male with past medical history of ischemic cardiomyopathy (EF 15 to 20%), CAD status post four-vessel CABG (), hypertension, left upper lobe adenocarcinoma status post radiotherapy (), MGUS who is postop day 1 from LVAD placement for stage D systolic heart failure. Intra-op course course complicated by bleeding requiring multiple rounds of pRBC's, platelets, FFP, cellsaver, and cryoprecipitate. Intra-op CHEL significant for moderate to severe RV dysfunction. General/neuro: Optimize pain control; oxy/acetaminophen/fent    Respiratory: Incentive [place] B[place] to chair as able. Optimize pain control    Cardiac: Status post HeartMate 3 LVAD and aortic valve closure. Dobutamine and Epinephrine PRN for inotropic support. GI: ADAT. ID: Perioperative antibiotics including Vanco, Levaquin, fluconazole as per LVAD protocol. Renal: No acute issues. Prophylaxis: Weight-based heparin started with minimal chest tube output. Subjective:   Progress Note: 2022      Reason for ICU Admission: Post LVAD    HPI: As Above    Overnight Events:    -- Amio, Dobutamine, EPI, Nicardipine, Heparin, Insulin; 4L NC    S/P:   Procedure(s):  REDO STERNOTOMY; RIGHT GROIN CUTDOWN FOR CANNULATION;  INSERTION OF LEFT VENTRICULAR ASSIST DEVICE (HMIII); AORTIC VALVE CLOSURE CHEL BY DR. Britt Citizen.     Active Problem List:     Problem List  Date Reviewed: 3/17/2022          Codes Class    S/P ventricular assist device [facility]) ICD-10-CM: Z95.811  ICD-9-CM: V45.89     Overview Signed 2022  2:22 PM by YOLI Bennett     REDO STERNOTOMY   RIGHT GROIN CUTDOWN FOR CANNULATION with access of CVF and CFA  INSERTION OF LEFT VENTRICULAR ASSIST DEVICE (HMIII)  AORTIC VALVE CLOSURE with Park's stitch             HFrEF (heart failure with reduced ejection fraction) ([facility]) ICD-10-CM: I50.20  ICD-9-CM: 428.20         NSTEMI (non-ST elevated myocardial infarction) ([facility]) ICD-10-CM: I21.4  ICD-9-CM: 410.70         Acute on chronic clinical systolic heart failure ([facility]) ICD-10-CM: I50.23  ICD-9-CM: 428.23         Active advance directive on file ICD-10-CM: Z78.9  ICD-9-CM: V49.89         Type 2 diabetes, controlled, with peripheral circulatory disorder ([facility]) ICD-10-CM: E11.51  ICD-9-CM: 250.70, 443.81         CHF, stage C ([facility] [address] ICD-10-CM: I50.9  ICD-9-CM: 428.0         Mixed hyperlipidemia ICD-10-CM: E78.2  ICD-9-CM: 272.2         Proteinuria ICD-10-CM: R80.9  ICD-9-CM: 791.0         BPH without obstruction/lower urinary tract symptoms ICD-10-CM: N40.0  ICD-9-CM: 600.00         Hematuria, gross ICD-10-CM: R31.0  ICD-9-CM: 599.71         Cardiac arrest ([facility] [address] ICD-10-CM: I46.9  ICD-9-CM: 427.5         Insomnia, unspecified ICD-10-CM: G47.00  ICD-9-CM: 780.52         Encounter for long-term (current) use of other medications ICD-10-CM: Z79.899  ICD-9-CM: V58.69         Calculus of kidney ICD-10-CM: N20.0  ICD-9-CM: 592.0         Coronary atherosclerosis of native coronary artery ICD-10-CM: I25.10  ICD-9-CM: 414.01         Benign neoplasm of colon ICD-10-CM: D12.6  ICD-9-CM: 211.3         Unspecified sleep apnea ICD-10-CM: G47.30  ICD-9-CM: 780.57         Reflux esophagitis ICD-10-CM: K21.00  ICD-9-CM: 530.11               Past Medical History:      has a past medical history of CAD (coronary artery disease) ('90 '97, '13 x 2), Calculus of kidney, Colonic polyps, Congestive heart failure, unspecified, Diabetes ([facility] [address] Gastritis, Hypercholesterolemia, and Sleep apnea.     Past Surgical History:      has a past surgical history that includes hx heent; colonoscopy (04/06/2011); endoscopy, colon, diagnostic; hx coronary artery bypass graft (8/22/12); hx pacemaker placement (1/30/13); pr cardiac surg procedure unlist (2012); and colonoscopy (N/A, 3/2/2022). Home Medications:     Prior to Admission medications    Medication Sig Start Date End Date Taking? Authorizing Provider   potassium chloride SR (K-TAB) 20 mEq tablet Take 2 Tablets by mouth two (2) times a day. 3/9/22  Yes Pamela Wong NP   milrinone (PRIMACOR) 20 mg/100 mL (200 mcg/mL) infusion 28.5 mcg/min by IntraVENous route continuous. 3/3/22  Yes Pamela Wong NP   tamsulosin (FLOMAX) 0.4 mg capsule TAKE 1 CAPSULE DAILY 3/2/22  Yes Diane Gustafson MD   bumetanide (BUMEX) 2 mg tablet Take 1 Tablet by mouth two (2) times a day. Patient taking differently: Take 2 mg by mouth two (2) times a day. 2 mg in am 1mg in evening 2/11/22  Yes Michael Harding NP   prasugreL (Effient) 10 mg tablet Take 10 mg by mouth daily. Yes Provider, Historical   metFORMIN (GLUCOPHAGE) 500 mg tablet Take 1 Tablet by mouth two (2) times daily (with meals). 1/11/22  Yes Diane Gustafson MD   ranolazine ER (Ranexa) 500 mg SR tablet Take 1 Tablet by mouth two (2) times a day. 1/11/22  Yes Diane Gustafson MD   eplerenone (INSPRA) 50 mg tab tablet Take 1 Tablet by mouth daily. 1/11/22  Yes Diane Gustafson MD   isosorbide mononitrate ER (IMDUR) 30 mg tablet Take 1 Tablet by mouth every twelve (12) hours. 1/4/22  Yes Rubina Esqueda NP   hydrALAZINE (APRESOLINE) 50 mg tablet TAKE 1 TABLET BY MOUTH THREE TIMES DAILY 12/17/21  Yes Monica Stewart NP   dapagliflozin Leigh Situ) 10 mg tab tablet Take 1 Tablet by mouth daily. 11/12/21  Yes Bernard Conteh NP   omeprazole (PRILOSEC) 20 mg capsule TAKE 1 CAPSULE BY MOUTH DAILY FOR INDIGESTION 7/6/21  Yes Chyna PAREDES, EARNESTINE   rosuvastatin (CRESTOR) 10 mg tablet TAKE 1 TABLET NIGHTLY 4/26/21  Yes Diane Gustafson MD   aspirin delayed-release 81 mg tablet Take 81 mg by mouth daily.    Yes Provider, Historical   benzonatate (TESSALON) 200 mg capsule Take 200 mg by mouth three (3) times daily as needed for Cough. Patient not taking: Reported on 3/7/2022    Provider, Historical   diphenhydrAMINE (Benadryl Allergy) 25 mg tablet Take 25 mg by mouth every six (6) hours as needed. Needed every night d/t picc line causing itching  Patient not taking: Reported on 4/3/2022    Provider, Historical   glucose blood VI test strips (OneTouch Ultra Test) strip USE TO TEST BLOOD SUGAR DAILY 21   Shaheen Gustafson MD   OneTouch Delica Plus Lancet 33 gauge misc USE TO TEST BLOOD SUGAR DAILY 21   Shaheen Gustafson MD   lancets misc Use to test blood sugar daily 20   Shaheen Gustafson MD   acetaminophen (TYLENOL EXTRA STRENGTH) 500 mg tablet Take  by mouth every six (6) hours as needed. Patient not taking: Reported on 3/17/2022    Provider, Historical       Allergies/Social/Family History: Allergies   Allergen Reactions    Oxycodone Anaphylaxis    Pcn [Penicillins] Hives      Social History     Tobacco Use    Smoking status: Never Smoker    Smokeless tobacco: Never Used   Substance Use Topics    Alcohol use: Yes     Alcohol/week: 0.0 standard drinks     Comment:  VERY RARE      Family History   Problem Relation Age of Onset    Heart Disease Mother         MI    Heart Disease Father         CAD & PVD    Lung Disease Father     Cancer Father         lung    Diabetes Maternal Grandmother     Heart Disease Other         CAD    Stroke Sister        Review of Systems:     A comprehensive review of systems was negative except for that written in the HPI.     Objective:   Vital Signs:  Visit Vitals  BP (!) 89/61   Pulse 85   Temp 98.4 °F (36.9 °C)   Resp 24   Wt 86.6 kg (190 lb 14.7 oz)   SpO2 98%   BMI 25.19 kg/m²    O2 Flow Rate (L/min): 4 l/min O2 Device: Nasal cannula Temp (24hrs), Av.6 °F (37.6 °C), Min:98.4 °F (36.9 °C), Max:100.8 °F (38.2 °C)    CVP (mmHg): 15 mmHg (22 0600)      Intake/Output:     Intake/Output Summary (Last 24 hours) at 4/7/2022 2301  Last data filed at 4/7/2022 0600  Gross per 24 hour   Intake 3131.08 ml   Output 1777 ml   Net 1354.08 ml       Physical Exam:    General appearance: no distress, appears stated age  Respiratory: Decreased breath sounds b/l  Cardiac: Normal Sinus rhythm   Abdomen: soft, hypoactive sounds  Extremities: warm, perfused. LABS AND  DATA: Personally reviewed  Recent Labs     04/07/22 0316 04/06/22  0308   WBC 8.7 5.9   HGB 7.0* 8.0*   HCT 22.1* 25.0*   * 149*     Recent Labs     04/07/22  0316 04/06/22  0308   * 137   K 4.3 4.1    108   CO2 22 23   BUN 20 14   CREA 1.11 0.94   * 115*   CA 8.5 8.5   MG 2.0 2.0     Recent Labs     04/07/22  0316 04/06/22  0308   AP 79 91   TP 6.1* 6.0*   ALB 3.5 3.3*   GLOB 2.6 2.7     Recent Labs     04/07/22  0316 04/06/22  2041 04/06/22  1235 04/06/22  0308   INR 1.3*  --   --  1.1   PTP 13.6*  --   --  11.9*   APTT 72.3* 70.5*   < > 31.5*    < > = values in this interval not displayed. Recent Labs     04/06/22  1418 04/06/22  0424 04/05/22  1532 04/05/22  1532   PHI 7.45 7.42   < > 7.43   PCO2I 30.9* 35.0   < > 36.3   PO2I 143* 408*   < > 641*   FIO2I  --  80  --  100    < > = values in this interval not displayed. No results for input(s): CPK, CKMB, TROIQ, BNPP in the last 72 hours. Hemodynamics:   PAP: PAP Systolic: 35 (35/15/06 2062) CO: CO (l/min): 3.9 l/min (04/07/22 0600)   Wedge:   CI: CI (l/min/m2): 2 l/min/m2 (04/07/22 0600)   CVP:  CVP (mmHg): 15 mmHg (04/07/22 0600) SVR:       PVR:       Ventilator Settings:  Mode Rate Tidal Volume Pressure FiO2 PEEP   Spontaneous,Pressure support   520 ml  5 cm H2O 40 % 5 cm H20     Peak airway pressure: 18 cm H2O    Minute ventilation: 7.71 l/min        MEDS: Reviewed    Chest X-Ray:  CXR Results  (Last 48 hours)               04/07/22 0449  XR CHEST PORT Final result    Impression:  1.  No significant change in the appearance of the chest or support hardware. Narrative:  INDICATION: . HF   COMPARISON: Previous chest xray, yesterday. LIMITATIONS: Portable technique. Sara Rocha FINDINGS: Single frontal view of the chest.    .   Lines/tubes/surgical: No significant change in the appearance of the right IJ   approach [place]-Senthil catheter, LVAD, cardiac assist device and mediastinal/chest   drains. Heart/mediastinum: Unremarkable. Lungs/pleura: Bibasilar opacities. No visible pneumothorax. Additional Comments: None. Sara Rocha 04/06/22 0429  XR CHEST PORT Final result    Impression:  1. No significant change in the appearance of the chest or support hardware. Narrative:  INDICATION: . HF   COMPARISON: Previous chest xray, yesterday. LIMITATIONS: Portable technique. Sara Rocha FINDINGS: Single frontal view of the chest.    .   Lines/tubes/surgical: No significant change in the appearance of the drainage   approach [place]-Senthil catheter, ET tube, gastric tube, mediastinal/chest drains and   LVAD. Heart/mediastinum: Cardiac assist device is likely unchanged. Lungs/pleura: No focal consolidation. No visualized pleural effusion or   pneumothorax. Additional Comments: None. Sara Rocha 04/05/22 1523  XR CHEST PORT Final result    Impression:  Support devices in place. No acute changes. Narrative:  Clinical indication: Shortness of breath. Portable AP semiupright view of the chest obtained and compared to April 3. [place]-Senthil catheter is in the outflow tract, ET tube and NG tube are in place,   chest tubes in place. Lung fields are clear, heart size is normal. Assisted   device in place. Next                 ECHO:        Multidisciplinary Rounds Completed:   Yes    ABCDEF Bundle/Checklist Completed:  Yes    SPECIAL EQUIPMENT  PA Catheter    DISPOSITION  Stay in ICU    CRITICAL CARE CONSULTANT NOTE  I had a face to face encounter with the patient, reviewed and interpreted patient data including clinical events, labs, images, vital signs, I/O's, and examined patient. I have discussed the case and the plan and management of the patient's care with the consulting services, the bedside nurses and the respiratory therapist.      NOTE OF PERSONAL INVOLVEMENT IN CARE   This patient has a high probability of imminent, clinically significant deterioration, which requires the highest level of preparedness to intervene urgently. I participated in the decision-making and personally managed or directed the management of the following life and organ supporting interventions that required my frequent assessment to treat or prevent imminent deterioration. I personally spent 75 minutes of critical care time. This is time spent at this critically ill patient's bedside actively involved in patient care as well as the coordination of care. This does not include any procedural time which has been billed separately.     Hugo Owens DO  Staff Intensivist/Anesthesiologist  [facility]  4/7/2022

## 2022-04-07 NOTE — PROGRESS NOTES
Critical Care Note      Cardiac surgery was called for the evaluation and management of: NYHA class IV A/C systolic heart failure, inotrope dependence, right ventricular dysfunction      The critical nature of the patient's condition includes the following: The patient is at imminent risk of death from NYHA class IV A/C systolic heart failure, inotrope dependence, and right ventricular dysfunction. He is at imminent risk of needing escalation of MCS and right sided MCS     Critical care diagnoses that are being treated:  NYHA class IV A/C systolic heart failure / inotrope dependence  Right ventricular dysfunction      HPI: Patient is a 70 yo with severe NYHA class IV A/C systolic heart failure, inotrope dependence, and right ventricular dysfunction. The patient is at imminent risk of death from NYHA class IV A/C systolic heart failure, inotrope dependence, and right ventricular dysfunction. He is at imminent risk of needing escalation of MCS and right sided MCS.   Asked to evaluate for permanent LVAD     NYHA class IV A/C systolic heart failure / Cardiogenic shock   Extubated  Remains on dual inotropic support (Dobutamine and Milrinone)  NT pro-BNP 5K  Pulmonary edema on CXR  CVP 10-15, PA 30/20's  Cardiac index 2's  MAPs 70-80s  Continued issues with low flows  Lactic acid elevated   Getting STAT TTE    Lactic acid 2.6    Addendum  Here for TTE  Looks like LV cavity a little dilated  RV had moderate dysfunction   Up on speed to 4800  Discussed with HF team      Right ventricular dysfunction   Continues on dual inotropes  LFTs bumped to 200's  Likely some hepatic congestion         Intake/Output Summary (Last 24 hours) at 4/7/2022 0756  Last data filed at 4/7/2022 0700  Gross per 24 hour   Intake 3131.08 ml   Output 2145 ml   Net 986.08 ml      Visit Vitals  BP (!) 89/61   Pulse 87   Temp 98.4 °F (36.9 °C)   Resp 25   Wt 190 lb 14.7 oz (86.6 kg)   SpO2 98%   BMI 25.19 kg/m²      CXR Results  (Last 48 hours) 04/07/22 0449  XR CHEST PORT Final result    Impression:  1. No significant change in the appearance of the chest or support hardware. Narrative:  INDICATION: . HF   COMPARISON: Previous chest xray, yesterday. LIMITATIONS: Portable technique. Kanu Pesa FINDINGS: Single frontal view of the chest.    .   Lines/tubes/surgical: No significant change in the appearance of the right IJ   approach [place]-Senthil catheter, LVAD, cardiac assist device and mediastinal/chest   drains. Heart/mediastinum: Unremarkable. Lungs/pleura: Bibasilar opacities. No visible pneumothorax. Additional Comments: None. Kanu To 04/06/22 0429  XR CHEST PORT Final result    Impression:  1. No significant change in the appearance of the chest or support hardware. Narrative:  INDICATION: . HF   COMPARISON: Previous chest xray, yesterday. LIMITATIONS: Portable technique. Kanu Pesa FINDINGS: Single frontal view of the chest.    .   Lines/tubes/surgical: No significant change in the appearance of the drainage   approach [place]-Senthil catheter, ET tube, gastric tube, mediastinal/chest drains and   LVAD. Heart/mediastinum: Cardiac assist device is likely unchanged. Lungs/pleura: No focal consolidation. No visualized pleural effusion or   pneumothorax. Additional Comments: None. Kanu To 04/05/22 1523  XR CHEST PORT Final result    Impression:  Support devices in place. No acute changes. Narrative:  Clinical indication: Shortness of breath. Portable AP semiupright view of the chest obtained and compared to April 3. [place]-Senthil catheter is in the outflow tract, ET tube and NG tube are in place,   chest tubes in place. Lung fields are clear, heart size is normal. Assisted   device in place.  Next                   LVAD   Pump Speed (RPM): 4600  Pump Flow (LPM): 2.8  PI (Pulsitility Index): 9.1  Power: 2.9   Test: No  Back Up  at Bedside & Labeled: Yes  Power Module Test: No  Driveline Site Care: No  Driveline Dressing: Clean, Dry, and Intact  Testing  Alarms Reviewed: Yes  Back up SC speed: 4600  Back up Low Speed Limit: 4000  Emergency Equipment with Patient?: Yes  Emergency procedures reviewed?: Yes  Drive line site inspected?: No  Drive line intergrity inspected?: Yes  Drive line dressing changed?: No      Total critical care time - 135 minutes (CPT 45266, 99292 x 2)     I personally spent the above critical care time. This is time spent at this critically ill patient's bedside / unit / floor actively involved in patient care as well as the coordination of care. This does not include any procedural time which has been billed separately. This does not include any NP/PA patient care time. I had a face to face encounter with the patient, reviewed and interpreted patient data including clinical events, labs, images, vital signs, I/O's, and examined patient. I have discussed the case and the plan and management of the patient's care with the consulting services and bedside nurses. This patient has a high probability of imminent, clinically significant deterioration, which requires the highest level of preparedness to intervene urgently. I participated in the decision-making and personally managed or directed the management of life and organ supporting interventions to treat or prevent imminent deterioration. This patient has a critical illness or injury that is acutely impairing one or more vital organ systems such that there is a high probability of imminent or life threatening deterioration in the patient's condition. This patient requires high complexity medical decision making to assess, manipulate, and support vital organ system failure. After stabilization, this patient still requires management to prevent further life / organ threatening deterioration.

## 2022-04-08 ENCOUNTER — APPOINTMENT (OUTPATIENT)
Dept: NON INVASIVE DIAGNOSTICS | Age: 77
DRG: 001 | End: 2022-04-08
Attending: NURSE PRACTITIONER
Payer: MEDICARE

## 2022-04-08 ENCOUNTER — APPOINTMENT (OUTPATIENT)
Dept: GENERAL RADIOLOGY | Age: 77
DRG: 001 | End: 2022-04-08
Attending: NURSE PRACTITIONER
Payer: MEDICARE

## 2022-04-08 LAB
ADMINISTERED INITIALS, ADMINIT: NORMAL
ALBUMIN SERPL-MCNC: 3 G/DL (ref 3.5–5)
ALBUMIN/GLOB SERPL: 1 {RATIO} (ref 1.1–2.2)
ALP SERPL-CCNC: 82 U/L (ref 45–117)
ALT SERPL-CCNC: 31 U/L (ref 12–78)
ANION GAP SERPL CALC-SCNC: 6 MMOL/L (ref 5–15)
APTT PPP: 47.2 SEC (ref 22.1–31)
APTT PPP: 50.1 SEC (ref 22.1–31)
AST SERPL-CCNC: 174 U/L (ref 15–37)
BASE DEFICIT BLDV-SCNC: 4.2 MMOL/L
BDY SITE: ABNORMAL
BDY SITE: ABNORMAL
BILIRUB SERPL-MCNC: 3.6 MG/DL (ref 0.2–1)
BNP SERPL-MCNC: 5791 PG/ML
BUN SERPL-MCNC: 25 MG/DL (ref 6–20)
BUN/CREAT SERPL: 27 (ref 12–20)
CALCIUM SERPL-MCNC: 8.3 MG/DL (ref 8.5–10.1)
CHLORIDE SERPL-SCNC: 104 MMOL/L (ref 97–108)
CO2 SERPL-SCNC: 23 MMOL/L (ref 21–32)
COHGB MFR BLD: 0.6 % (ref 1–2)
CREAT SERPL-MCNC: 0.91 MG/DL (ref 0.7–1.3)
D50 ADMINISTERED, D50ADM: 0 ML
D50 ORDER, D50ORD: 0 ML
D50 ORDER, D50ORD: 10 ML
D50 ORDER, D50ORD: 8 ML
ECHO EST RA PRESSURE: 3 MMHG
ECHO LV E' LATERAL VELOCITY: 8 CM/S
ECHO LV E' SEPTAL VELOCITY: 4 CM/S
ECHO LV FRACTIONAL SHORTENING: 17 % (ref 28–44)
ECHO LV INTERNAL DIMENSION DIASTOLE INDEX: 2.25 CM/M2
ECHO LV INTERNAL DIMENSION DIASTOLIC: 4.8 CM (ref 4.2–5.9)
ECHO LV INTERNAL DIMENSION SYSTOLIC INDEX: 1.88 CM/M2
ECHO LV INTERNAL DIMENSION SYSTOLIC: 4 CM
ECHO LV IVSD: 1.6 CM (ref 0.6–1)
ECHO LV MASS 2D: 334.6 G (ref 88–224)
ECHO LV MASS INDEX 2D: 157.1 G/M2 (ref 49–115)
ECHO LV POSTERIOR WALL DIASTOLIC: 1.6 CM (ref 0.6–1)
ECHO LV RELATIVE WALL THICKNESS RATIO: 0.67
ECHO MV A VELOCITY: 0.33 M/S
ECHO MV AREA PHT: 5.5 CM2
ECHO MV E DECELERATION TIME (DT): 138 MS
ECHO MV E VELOCITY: 0.66 M/S
ECHO MV E/A RATIO: 2
ECHO MV E/E' LATERAL: 8.25
ECHO MV E/E' RATIO (AVERAGED): 12.38
ECHO MV E/E' SEPTAL: 16.5
ECHO MV PRESSURE HALF TIME (PHT): 40 MS
ECHO PV MAX VELOCITY: 0.5 M/S
ECHO PV PEAK GRADIENT: 1 MMHG
ECHO RIGHT VENTRICULAR SYSTOLIC PRESSURE (RVSP): 28 MMHG
ECHO RV FREE WALL PEAK S': 5 CM/S
ECHO RV TAPSE: 1.3 CM (ref 1.7–?)
ECHO TV REGURGITANT MAX VELOCITY: 2.49 M/S
ECHO TV REGURGITANT PEAK GRADIENT: 25 MMHG
ERYTHROCYTE [DISTWIDTH] IN BLOOD BY AUTOMATED COUNT: 24.4 % (ref 11.5–14.5)
FERRITIN SERPL-MCNC: 812 NG/ML (ref 26–388)
GAS FLOW.O2 O2 DELIVERY SYS: 4 L/MIN
GLOBULIN SER CALC-MCNC: 3 G/DL (ref 2–4)
GLSCOM COMMENTS: NORMAL
GLUCOSE BLD STRIP.AUTO-MCNC: 102 MG/DL (ref 65–117)
GLUCOSE BLD STRIP.AUTO-MCNC: 105 MG/DL (ref 65–117)
GLUCOSE BLD STRIP.AUTO-MCNC: 106 MG/DL (ref 65–117)
GLUCOSE BLD STRIP.AUTO-MCNC: 109 MG/DL (ref 65–117)
GLUCOSE BLD STRIP.AUTO-MCNC: 116 MG/DL (ref 65–117)
GLUCOSE BLD STRIP.AUTO-MCNC: 121 MG/DL (ref 65–117)
GLUCOSE BLD STRIP.AUTO-MCNC: 122 MG/DL (ref 65–117)
GLUCOSE BLD STRIP.AUTO-MCNC: 140 MG/DL (ref 65–117)
GLUCOSE BLD STRIP.AUTO-MCNC: 141 MG/DL (ref 65–117)
GLUCOSE BLD STRIP.AUTO-MCNC: 141 MG/DL (ref 65–117)
GLUCOSE BLD STRIP.AUTO-MCNC: 145 MG/DL (ref 65–117)
GLUCOSE BLD STRIP.AUTO-MCNC: 150 MG/DL (ref 65–117)
GLUCOSE BLD STRIP.AUTO-MCNC: 166 MG/DL (ref 65–117)
GLUCOSE BLD STRIP.AUTO-MCNC: 172 MG/DL (ref 65–117)
GLUCOSE BLD STRIP.AUTO-MCNC: 75 MG/DL (ref 65–117)
GLUCOSE BLD STRIP.AUTO-MCNC: 79 MG/DL (ref 65–117)
GLUCOSE BLD STRIP.AUTO-MCNC: 85 MG/DL (ref 65–117)
GLUCOSE BLD STRIP.AUTO-MCNC: 88 MG/DL (ref 65–117)
GLUCOSE BLD STRIP.AUTO-MCNC: 89 MG/DL (ref 65–117)
GLUCOSE BLD STRIP.AUTO-MCNC: 92 MG/DL (ref 65–117)
GLUCOSE BLD STRIP.AUTO-MCNC: 93 MG/DL (ref 65–117)
GLUCOSE BLD STRIP.AUTO-MCNC: 95 MG/DL (ref 65–117)
GLUCOSE SERPL-MCNC: 91 MG/DL (ref 65–100)
GLUCOSE, GLC: 102 MG/DL
GLUCOSE, GLC: 105 MG/DL
GLUCOSE, GLC: 106 MG/DL
GLUCOSE, GLC: 109 MG/DL
GLUCOSE, GLC: 116 MG/DL
GLUCOSE, GLC: 121 MG/DL
GLUCOSE, GLC: 122 MG/DL
GLUCOSE, GLC: 140 MG/DL
GLUCOSE, GLC: 141 MG/DL
GLUCOSE, GLC: 141 MG/DL
GLUCOSE, GLC: 145 MG/DL
GLUCOSE, GLC: 150 MG/DL
GLUCOSE, GLC: 166 MG/DL
GLUCOSE, GLC: 172 MG/DL
GLUCOSE, GLC: 75 MG/DL
GLUCOSE, GLC: 79 MG/DL
GLUCOSE, GLC: 85 MG/DL
GLUCOSE, GLC: 88 MG/DL
GLUCOSE, GLC: 89 MG/DL
GLUCOSE, GLC: 92 MG/DL
GLUCOSE, GLC: 93 MG/DL
GLUCOSE, GLC: 95 MG/DL
HCO3 BLDV-SCNC: 21 MMOL/L (ref 23–28)
HCT VFR BLD AUTO: 23 % (ref 36.6–50.3)
HCT VFR BLD AUTO: 25.7 % (ref 36.6–50.3)
HGB BLD OXIMETRY-MCNC: 9.3 G/DL (ref 14–17)
HGB BLD-MCNC: 7.5 G/DL (ref 12.1–17)
HGB BLD-MCNC: 8.5 G/DL (ref 12.1–17)
HIGH TARGET, HITG: 130 MG/DL
HISTORY CHECKED?,CKHIST: NORMAL
INR PPP: 1.2 (ref 0.9–1.1)
INSULIN ADMINSTERED, INSADM: 0 UNITS/HOUR
INSULIN ADMINSTERED, INSADM: 0.8 UNITS/HOUR
INSULIN ADMINSTERED, INSADM: 0.9 UNITS/HOUR
INSULIN ADMINSTERED, INSADM: 1 UNITS/HOUR
INSULIN ADMINSTERED, INSADM: 1.1 UNITS/HOUR
INSULIN ADMINSTERED, INSADM: 1.2 UNITS/HOUR
INSULIN ADMINSTERED, INSADM: 1.2 UNITS/HOUR
INSULIN ADMINSTERED, INSADM: 1.4 UNITS/HOUR
INSULIN ADMINSTERED, INSADM: 1.5 UNITS/HOUR
INSULIN ADMINSTERED, INSADM: 1.6 UNITS/HOUR
INSULIN ADMINSTERED, INSADM: 2.1 UNITS/HOUR
INSULIN ADMINSTERED, INSADM: 3.2 UNITS/HOUR
INSULIN ADMINSTERED, INSADM: 3.3 UNITS/HOUR
INSULIN ADMINSTERED, INSADM: 3.6 UNITS/HOUR
INSULIN ADMINSTERED, INSADM: 3.6 UNITS/HOUR
INSULIN ADMINSTERED, INSADM: 4.3 UNITS/HOUR
INSULIN ADMINSTERED, INSADM: 4.5 UNITS/HOUR
INSULIN ADMINSTERED, INSADM: 4.9 UNITS/HOUR
INSULIN ORDER, INSORD: 0 UNITS/HOUR
INSULIN ORDER, INSORD: 0 UNITS/HOUR
INSULIN ORDER, INSORD: 0.8 UNITS/HOUR
INSULIN ORDER, INSORD: 0.9 UNITS/HOUR
INSULIN ORDER, INSORD: 1 UNITS/HOUR
INSULIN ORDER, INSORD: 1.1 UNITS/HOUR
INSULIN ORDER, INSORD: 1.2 UNITS/HOUR
INSULIN ORDER, INSORD: 1.2 UNITS/HOUR
INSULIN ORDER, INSORD: 1.4 UNITS/HOUR
INSULIN ORDER, INSORD: 1.5 UNITS/HOUR
INSULIN ORDER, INSORD: 1.5 UNITS/HOUR
INSULIN ORDER, INSORD: 1.6 UNITS/HOUR
INSULIN ORDER, INSORD: 2.1 UNITS/HOUR
INSULIN ORDER, INSORD: 3.2 UNITS/HOUR
INSULIN ORDER, INSORD: 3.3 UNITS/HOUR
INSULIN ORDER, INSORD: 3.6 UNITS/HOUR
INSULIN ORDER, INSORD: 3.6 UNITS/HOUR
INSULIN ORDER, INSORD: 4.3 UNITS/HOUR
INSULIN ORDER, INSORD: 4.5 UNITS/HOUR
INSULIN ORDER, INSORD: 4.9 UNITS/HOUR
IRON SATN MFR SERPL: 8 % (ref 20–50)
IRON SERPL-MCNC: 13 UG/DL (ref 35–150)
LACTATE SERPL-SCNC: 1.2 MMOL/L (ref 0.4–2)
LDH SERPL L TO P-CCNC: 306 U/L (ref 85–241)
LOW TARGET, LOT: 95 MG/DL
MAGNESIUM SERPL-MCNC: 2.2 MG/DL (ref 1.6–2.4)
MCH RBC QN AUTO: 25.8 PG (ref 26–34)
MCHC RBC AUTO-ENTMCNC: 32.6 G/DL (ref 30–36.5)
MCV RBC AUTO: 79 FL (ref 80–99)
METHGB MFR BLD: 0.2 % (ref 0–1.4)
MINUTES UNTIL NEXT BG, NBG: 120 MIN
MINUTES UNTIL NEXT BG, NBG: 15 MIN
MINUTES UNTIL NEXT BG, NBG: 15 MIN
MINUTES UNTIL NEXT BG, NBG: 60 MIN
MULTIPLIER, MUL: 0.02
MULTIPLIER, MUL: 0.02
MULTIPLIER, MUL: 0.03
MULTIPLIER, MUL: 0.04
MULTIPLIER, MUL: 0.05
MULTIPLIER, MUL: 0.05
MULTIPLIER, MUL: 0.06
MULTIPLIER, MUL: 0.06
NRBC # BLD: 0 K/UL (ref 0–0.01)
NRBC BLD-RTO: 0 PER 100 WBC
ORDER INITIALS, ORDINIT: NORMAL
OXYHGB MFR BLD: 59.6 % (ref 94–97)
PCO2 BLDV: 36 MMHG (ref 41–51)
PH BLDV: 7.38 [PH] (ref 7.32–7.42)
PLATELET # BLD AUTO: 99 K/UL (ref 150–400)
PO2 BLDV: 22 MMHG (ref 25–40)
POTASSIUM SERPL-SCNC: 4 MMOL/L (ref 3.5–5.1)
PROT SERPL-MCNC: 6 G/DL (ref 6.4–8.2)
PROTHROMBIN TIME: 12 SEC (ref 9–11.1)
RBC # BLD AUTO: 2.91 M/UL (ref 4.1–5.7)
SAO2 % BLD: 60 % (ref 95–99)
SAO2% DEVICE SAO2% SENSOR NAME: ABNORMAL
SERVICE CMNT-IMP: ABNORMAL
SERVICE CMNT-IMP: NORMAL
SODIUM SERPL-SCNC: 133 MMOL/L (ref 136–145)
SPECIMEN SITE: ABNORMAL
SPECIMEN SITE: ABNORMAL
THERAPEUTIC RANGE,PTTT: ABNORMAL SECS (ref 58–77)
THERAPEUTIC RANGE,PTTT: ABNORMAL SECS (ref 58–77)
TIBC SERPL-MCNC: 153 UG/DL (ref 250–450)
WBC # BLD AUTO: 8.6 K/UL (ref 4.1–11.1)

## 2022-04-08 PROCEDURE — 85730 THROMBOPLASTIN TIME PARTIAL: CPT

## 2022-04-08 PROCEDURE — 36430 TRANSFUSION BLD/BLD COMPNT: CPT

## 2022-04-08 PROCEDURE — 83880 ASSAY OF NATRIURETIC PEPTIDE: CPT

## 2022-04-08 PROCEDURE — 82728 ASSAY OF FERRITIN: CPT

## 2022-04-08 PROCEDURE — 97530 THERAPEUTIC ACTIVITIES: CPT

## 2022-04-08 PROCEDURE — 85610 PROTHROMBIN TIME: CPT

## 2022-04-08 PROCEDURE — 74011250637 HC RX REV CODE- 250/637: Performed by: NURSE PRACTITIONER

## 2022-04-08 PROCEDURE — 74011000258 HC RX REV CODE- 258: Performed by: NURSE PRACTITIONER

## 2022-04-08 PROCEDURE — 74011250636 HC RX REV CODE- 250/636: Performed by: NURSE PRACTITIONER

## 2022-04-08 PROCEDURE — 74011250636 HC RX REV CODE- 250/636: Performed by: THORACIC SURGERY (CARDIOTHORACIC VASCULAR SURGERY)

## 2022-04-08 PROCEDURE — 74011000250 HC RX REV CODE- 250: Performed by: NURSE PRACTITIONER

## 2022-04-08 PROCEDURE — 74011000250 HC RX REV CODE- 250: Performed by: THORACIC SURGERY (CARDIOTHORACIC VASCULAR SURGERY)

## 2022-04-08 PROCEDURE — 77030012390 HC DRN CHST BTL GTNG -B

## 2022-04-08 PROCEDURE — 83615 LACTATE (LD) (LDH) ENZYME: CPT

## 2022-04-08 PROCEDURE — 77030037442 HC TY LVAD MGMT SYST CMP-B

## 2022-04-08 PROCEDURE — 93750 INTERROGATION VAD IN PERSON: CPT | Performed by: THORACIC SURGERY (CARDIOTHORACIC VASCULAR SURGERY)

## 2022-04-08 PROCEDURE — P9045 ALBUMIN (HUMAN), 5%, 250 ML: HCPCS | Performed by: INTERNAL MEDICINE

## 2022-04-08 PROCEDURE — 74011636637 HC RX REV CODE- 636/637: Performed by: NURSE PRACTITIONER

## 2022-04-08 PROCEDURE — 65610000003 HC RM ICU SURGICAL

## 2022-04-08 PROCEDURE — 99231 SBSQ HOSP IP/OBS SF/LOW 25: CPT | Performed by: CLINICAL NURSE SPECIALIST

## 2022-04-08 PROCEDURE — 83540 ASSAY OF IRON: CPT

## 2022-04-08 PROCEDURE — 80053 COMPREHEN METABOLIC PANEL: CPT

## 2022-04-08 PROCEDURE — 82803 BLOOD GASES ANY COMBINATION: CPT

## 2022-04-08 PROCEDURE — 97161 PT EVAL LOW COMPLEX 20 MIN: CPT

## 2022-04-08 PROCEDURE — 85027 COMPLETE CBC AUTOMATED: CPT

## 2022-04-08 PROCEDURE — 85018 HEMOGLOBIN: CPT

## 2022-04-08 PROCEDURE — 99291 CRITICAL CARE FIRST HOUR: CPT | Performed by: NURSE PRACTITIONER

## 2022-04-08 PROCEDURE — 99291 CRITICAL CARE FIRST HOUR: CPT | Performed by: THORACIC SURGERY (CARDIOTHORACIC VASCULAR SURGERY)

## 2022-04-08 PROCEDURE — 82962 GLUCOSE BLOOD TEST: CPT

## 2022-04-08 PROCEDURE — 83735 ASSAY OF MAGNESIUM: CPT

## 2022-04-08 PROCEDURE — 93308 TTE F-UP OR LMTD: CPT

## 2022-04-08 PROCEDURE — 83605 ASSAY OF LACTIC ACID: CPT

## 2022-04-08 PROCEDURE — 74011000258 HC RX REV CODE- 258: Performed by: THORACIC SURGERY (CARDIOTHORACIC VASCULAR SURGERY)

## 2022-04-08 PROCEDURE — 97166 OT EVAL MOD COMPLEX 45 MIN: CPT

## 2022-04-08 PROCEDURE — 74011250637 HC RX REV CODE- 250/637: Performed by: INTERNAL MEDICINE

## 2022-04-08 PROCEDURE — 74011250636 HC RX REV CODE- 250/636: Performed by: INTERNAL MEDICINE

## 2022-04-08 PROCEDURE — P9016 RBC LEUKOCYTES REDUCED: HCPCS

## 2022-04-08 PROCEDURE — 36415 COLL VENOUS BLD VENIPUNCTURE: CPT

## 2022-04-08 PROCEDURE — 93750 INTERROGATION VAD IN PERSON: CPT | Performed by: NURSE PRACTITIONER

## 2022-04-08 PROCEDURE — 71045 X-RAY EXAM CHEST 1 VIEW: CPT

## 2022-04-08 RX ORDER — KETOROLAC TROMETHAMINE 30 MG/ML
15 INJECTION, SOLUTION INTRAMUSCULAR; INTRAVENOUS
Status: DISPENSED | OUTPATIENT
Start: 2022-04-08 | End: 2022-04-09

## 2022-04-08 RX ORDER — BUMETANIDE 0.25 MG/ML
1 INJECTION INTRAMUSCULAR; INTRAVENOUS
Status: DISCONTINUED | OUTPATIENT
Start: 2022-04-08 | End: 2022-04-21

## 2022-04-08 RX ORDER — GABAPENTIN 100 MG/1
100 CAPSULE ORAL 2 TIMES DAILY
Status: DISCONTINUED | OUTPATIENT
Start: 2022-04-08 | End: 2022-04-12

## 2022-04-08 RX ORDER — SODIUM CHLORIDE 9 MG/ML
250 INJECTION, SOLUTION INTRAVENOUS AS NEEDED
Status: DISCONTINUED | OUTPATIENT
Start: 2022-04-08 | End: 2022-04-11 | Stop reason: ALTCHOICE

## 2022-04-08 RX ORDER — HYDRALAZINE HYDROCHLORIDE 50 MG/1
50 TABLET, FILM COATED ORAL EVERY 6 HOURS
Status: DISCONTINUED | OUTPATIENT
Start: 2022-04-08 | End: 2022-04-08

## 2022-04-08 RX ORDER — MIRTAZAPINE 15 MG/1
7.5 TABLET, FILM COATED ORAL
Status: DISCONTINUED | OUTPATIENT
Start: 2022-04-08 | End: 2022-05-06 | Stop reason: HOSPADM

## 2022-04-08 RX ORDER — WARFARIN 2.5 MG/1
2.5 TABLET ORAL ONCE
Status: ACTIVE | OUTPATIENT
Start: 2022-04-08 | End: 2022-04-09

## 2022-04-08 RX ORDER — AMIODARONE HYDROCHLORIDE 200 MG/1
400 TABLET ORAL ONCE
Status: COMPLETED | OUTPATIENT
Start: 2022-04-08 | End: 2022-04-08

## 2022-04-08 RX ORDER — ALBUMIN HUMAN 50 G/1000ML
12.5 SOLUTION INTRAVENOUS 2 TIMES DAILY
Status: DISCONTINUED | OUTPATIENT
Start: 2022-04-08 | End: 2022-04-09

## 2022-04-08 RX ORDER — HEPARIN SODIUM 1000 [USP'U]/ML
2000 INJECTION, SOLUTION INTRAVENOUS; SUBCUTANEOUS ONCE
Status: COMPLETED | OUTPATIENT
Start: 2022-04-08 | End: 2022-04-08

## 2022-04-08 RX ORDER — AMIODARONE HYDROCHLORIDE 200 MG/1
400 TABLET ORAL DAILY
Status: DISCONTINUED | OUTPATIENT
Start: 2022-04-08 | End: 2022-04-09

## 2022-04-08 RX ORDER — LIDOCAINE 4 G/100G
1 PATCH TOPICAL EVERY 24 HOURS
Status: DISCONTINUED | OUTPATIENT
Start: 2022-04-08 | End: 2022-05-06 | Stop reason: HOSPADM

## 2022-04-08 RX ORDER — HYDRALAZINE HYDROCHLORIDE 50 MG/1
50 TABLET, FILM COATED ORAL 4 TIMES DAILY
Status: DISCONTINUED | OUTPATIENT
Start: 2022-04-08 | End: 2022-04-08

## 2022-04-08 RX ORDER — BUMETANIDE 0.25 MG/ML
1 INJECTION INTRAMUSCULAR; INTRAVENOUS ONCE
Status: COMPLETED | OUTPATIENT
Start: 2022-04-08 | End: 2022-04-08

## 2022-04-08 RX ORDER — HYDRALAZINE HYDROCHLORIDE 50 MG/1
50 TABLET, FILM COATED ORAL 4 TIMES DAILY
Status: DISCONTINUED | OUTPATIENT
Start: 2022-04-08 | End: 2022-04-09

## 2022-04-08 RX ADMIN — SUCRALFATE 1 G: 1 TABLET ORAL at 06:33

## 2022-04-08 RX ADMIN — POLYETHYLENE GLYCOL 3350 17 G: 17 POWDER, FOR SOLUTION ORAL at 08:47

## 2022-04-08 RX ADMIN — CHLORHEXIDINE GLUCONATE 10 ML: 1.2 RINSE ORAL at 08:59

## 2022-04-08 RX ADMIN — FENTANYL CITRATE 25 MCG: 0.05 INJECTION, SOLUTION INTRAMUSCULAR; INTRAVENOUS at 06:24

## 2022-04-08 RX ADMIN — SENNOSIDES AND DOCUSATE SODIUM 1 TABLET: 50; 8.6 TABLET ORAL at 08:47

## 2022-04-08 RX ADMIN — Medication 15 UNITS/KG/HR: at 04:14

## 2022-04-08 RX ADMIN — HYDRALAZINE HYDROCHLORIDE 5 MG: 20 INJECTION INTRAMUSCULAR; INTRAVENOUS at 11:49

## 2022-04-08 RX ADMIN — SUCRALFATE 1 G: 1 TABLET ORAL at 11:49

## 2022-04-08 RX ADMIN — SODIUM CHLORIDE, PRESERVATIVE FREE 10 ML: 5 INJECTION INTRAVENOUS at 06:09

## 2022-04-08 RX ADMIN — SUCRALFATE 1 G: 1 TABLET ORAL at 21:08

## 2022-04-08 RX ADMIN — MUPIROCIN: 20 OINTMENT TOPICAL at 19:05

## 2022-04-08 RX ADMIN — HEPARIN SODIUM 2000 UNITS: 1000 INJECTION INTRAVENOUS; SUBCUTANEOUS at 11:56

## 2022-04-08 RX ADMIN — BUMETANIDE 1 MG: 0.25 INJECTION, SOLUTION INTRAMUSCULAR; INTRAVENOUS at 07:31

## 2022-04-08 RX ADMIN — SUCRALFATE 1 G: 1 TABLET ORAL at 18:05

## 2022-04-08 RX ADMIN — SODIUM CHLORIDE, PRESERVATIVE FREE 10 ML: 5 INJECTION INTRAVENOUS at 15:14

## 2022-04-08 RX ADMIN — FAMOTIDINE 20 MG: 10 INJECTION INTRAVENOUS at 21:08

## 2022-04-08 RX ADMIN — DOBUTAMINE HYDROCHLORIDE 6 MCG/KG/MIN: 400 INJECTION INTRAVENOUS at 06:15

## 2022-04-08 RX ADMIN — MIRTAZAPINE 7.5 MG: 15 TABLET, FILM COATED ORAL at 21:08

## 2022-04-08 RX ADMIN — FENTANYL CITRATE 25 MCG: 0.05 INJECTION, SOLUTION INTRAMUSCULAR; INTRAVENOUS at 08:47

## 2022-04-08 RX ADMIN — MUPIROCIN: 20 OINTMENT TOPICAL at 08:59

## 2022-04-08 RX ADMIN — NICARDIPINE HYDROCHLORIDE 10 MG/HR: 25 INJECTION, SOLUTION INTRAVENOUS at 11:40

## 2022-04-08 RX ADMIN — SODIUM CHLORIDE 10 ML/HR: 4.5 INJECTION, SOLUTION INTRAVENOUS at 04:22

## 2022-04-08 RX ADMIN — HYDRALAZINE HYDROCHLORIDE 50 MG: 50 TABLET, FILM COATED ORAL at 21:08

## 2022-04-08 RX ADMIN — AMIODARONE HYDROCHLORIDE 400 MG: 200 TABLET ORAL at 10:56

## 2022-04-08 RX ADMIN — NICARDIPINE HYDROCHLORIDE 10 MG/HR: 25 INJECTION, SOLUTION INTRAVENOUS at 07:17

## 2022-04-08 RX ADMIN — FENTANYL CITRATE 25 MCG: 0.05 INJECTION, SOLUTION INTRAMUSCULAR; INTRAVENOUS at 04:16

## 2022-04-08 RX ADMIN — FENTANYL CITRATE 25 MCG: 0.05 INJECTION, SOLUTION INTRAMUSCULAR; INTRAVENOUS at 02:17

## 2022-04-08 RX ADMIN — AMIODARONE HYDROCHLORIDE 0.5 MG/MIN: 50 INJECTION, SOLUTION INTRAVENOUS at 06:27

## 2022-04-08 RX ADMIN — Medication 1.2 UNITS/HR: at 05:07

## 2022-04-08 RX ADMIN — ALBUMIN (HUMAN) 12.5 G: 12.5 INJECTION, SOLUTION INTRAVENOUS at 09:55

## 2022-04-08 RX ADMIN — NICARDIPINE HYDROCHLORIDE 5 MG/HR: 25 INJECTION, SOLUTION INTRAVENOUS at 20:11

## 2022-04-08 RX ADMIN — SODIUM CHLORIDE 9 ML/HR: 9 INJECTION, SOLUTION INTRAVENOUS at 04:22

## 2022-04-08 RX ADMIN — HYDRALAZINE HYDROCHLORIDE 50 MG: 50 TABLET, FILM COATED ORAL at 18:06

## 2022-04-08 RX ADMIN — EPINEPHRINE 2 MCG/MIN: 1 INJECTION INTRAMUSCULAR; INTRAVENOUS; SUBCUTANEOUS at 06:17

## 2022-04-08 RX ADMIN — ONDANSETRON HYDROCHLORIDE 4 MG: 2 SOLUTION INTRAMUSCULAR; INTRAVENOUS at 04:54

## 2022-04-08 RX ADMIN — NICARDIPINE HYDROCHLORIDE 10 MG/HR: 25 INJECTION, SOLUTION INTRAVENOUS at 02:27

## 2022-04-08 RX ADMIN — SODIUM CHLORIDE, PRESERVATIVE FREE 10 ML: 5 INJECTION INTRAVENOUS at 21:09

## 2022-04-08 RX ADMIN — ALBUMIN (HUMAN) 12.5 G: 12.5 INJECTION, SOLUTION INTRAVENOUS at 18:05

## 2022-04-08 RX ADMIN — FENTANYL CITRATE 25 MCG: 0.05 INJECTION, SOLUTION INTRAMUSCULAR; INTRAVENOUS at 00:09

## 2022-04-08 RX ADMIN — ASPIRIN 81 MG CHEWABLE TABLET 81 MG: 81 TABLET CHEWABLE at 08:47

## 2022-04-08 RX ADMIN — FAMOTIDINE 20 MG: 10 INJECTION INTRAVENOUS at 08:47

## 2022-04-08 RX ADMIN — HYDRALAZINE HYDROCHLORIDE 50 MG: 50 TABLET, FILM COATED ORAL at 08:47

## 2022-04-08 RX ADMIN — CHLORHEXIDINE GLUCONATE 10 ML: 1.2 RINSE ORAL at 19:05

## 2022-04-08 RX ADMIN — GABAPENTIN 100 MG: 100 CAPSULE ORAL at 18:05

## 2022-04-08 RX ADMIN — ONDANSETRON HYDROCHLORIDE 4 MG: 2 SOLUTION INTRAMUSCULAR; INTRAVENOUS at 09:46

## 2022-04-08 NOTE — PROGRESS NOTES
Cardiac Surgery Care Coordinator- Met with [address] continued use of the incentive spirometer and reinforced sternal precautions. He is without questions at this time.  Lian Benítez RN

## 2022-04-08 NOTE — PROGRESS NOTES
Problem: Mobility Impaired (Adult and Pediatric)  Goal: *Acute Goals and Plan of Care (Insert Text)  Description: FUNCTIONAL STATUS PRIOR TO ADMISSION: Patient was independent and active without use of DME.    HOME SUPPORT PRIOR TO ADMISSION: The patient lived with his wife but did not require assist.    Physical Therapy Goals  Initiated 4/8/2022  1. Patient will move from supine to sit and sit to supine , scoot up and down, and roll side to side in bed with supervision/set-up within 7 days. 2.  Patient will perform sit to/from stand with supervision/set-up within 7 days. 3.  Patient will ambulate 50 feet with least restrictive assistive device and minimal assistance/contact guard assist within 7 days. 4.  Patient will ascend/descend 6 stairs with handrail(s) with minimal assistance/contact guard assist within 14 days. 5.  Patient will perform cardiac exercises per protocol with supervision/set-up within 7 days. 6.  Patient will verbally and functionally recall 3/3 sternal precautions within 7 days. 7.  Patient will perform power exchange for power module to/from battery with minimal assistance within 7 days. Outcome: Not Met     PHYSICAL THERAPY EVALUATION  Patient: Bree Dill (20 y.o. male)  Date: 4/8/2022  Primary Diagnosis: HFrEF (heart failure with reduced ejection fraction) ([facility], located within [facility]) [I50.20]  Procedure(s) (LRB):  REDO STERNOTOMY; RIGHT GROIN CUTDOWN FOR CANNULATION;  INSERTION OF LEFT VENTRICULAR ASSIST DEVICE (HMIII); AORTIC VALVE CLOSURE CHEL BY DR. Lorenza Davila. (N/A) 3 Days Post-Op   Precautions:  Fall,Sternal (move in the tube; LVAD)      ASSESSMENT  Based on the objective data described below, the patient presents with impaired functional independence compared to baseline s/p LVAD, POD 3.   Currently patient's functional mobility is limited by newly enforced sternal precautions (move in the tube), impaired cardiopulmonary tolerance (SvO2 25% w/ activity, on 4L NCO2), post op sternotomy pain, ICU lines, and generally decreased strength, ROM, endurance, and activity tolerance. Reviewed mindful-based movement principles for load-bearing task. Patient is not verbalizing though is demonstrating understanding with verbal cues required for compliance. Also reviewed LVAD terminology with pt correctly identifying the , role of acute therapy and mobility progression. Received pt in bed on 4L NCO2, SvO2 upper 30's at rest.   Pt transitioned to sitting EOB w/ Min A and demonstrates good unsupported sitting balance. He utilized rocking momentum for sit to stand and achieved on the first attempt with Min A. He ambulated a few steps from the bed to the chair with Min A x2 w/ assist of a 3rd person to manage lines/ equipment. Patient fatigued while standing waiting for lines to be repositioned and sat w/ uncontrolled descent before lines were secured. RN arrived into the room to assist w/ lines. Anticipate steady functional gains and home with HHPT and caregiver assist.      Current Level of Function Impacting Discharge (mobility/balance): Min A x2     Functional Outcome Measure: The patient scored 2/28 on the Tinetti outcome measure which is indicative of high fall risk. Other factors to consider for discharge: sternotomy, LVAD, indep PLOF, O2 & Lines     Patient will benefit from skilled therapy intervention to address the above noted impairments. PLAN :  Recommendations and Planned Interventions: bed mobility training, transfer training, gait training, therapeutic exercises, neuromuscular re-education, edema management/control, patient and family training/education, and therapeutic activities      Frequency/Duration: Patient will be followed by physical therapy:  daily to address goals.     Recommendation for discharge: (in order for the patient to meet his/her long term goals)  Physical therapy at least 2 days/week in the home AND ensure assist and/or supervision for safety with LVAD and mobility    This discharge recommendation:  Has not yet been discussed the attending provider and/or case management    IF patient discharges home will need the following DME: to be determined (TBD) - anticipate none         SUBJECTIVE:   Patient stated Can I get something warm (to drink).     OBJECTIVE DATA SUMMARY:   Patient mobilized on continuous portable monitor/telemetry. HISTORY:    Past Medical History:   Diagnosis Date    CAD (coronary artery disease) '90  '97, '13 x 2    MI, code ice in 2013 [facility]    Calculus of kidney     Colonic polyps     Congestive heart failure, unspecified     Diabetes ([facility] [address]     Gastritis     Hypercholesterolemia     Sleep apnea      Past Surgical History:   Procedure Laterality Date    COLONOSCOPY  04/06/2011    16, due 21    COLONOSCOPY N/A 3/2/2022    COLONOSCOPY    :- [facility] by Erby Homans[facility] at [facility] ENDOSCOPY    ENDOSCOPY, COLON, DIAGNOSTIC      11, due 16    HX CORONARY ARTERY BYPASS GRAFT  8/22/12    x 4 vessel by MISSY Ramirez    HX HEENT      LASIK    HX PACEMAKER PLACEMENT  1/30/13    CO CARDIAC SURG PROCEDURE UNLIST  2012    x 4 vessels       Personal factors and/or comorbidities impacting plan of care: PMH/ HPI    Home Situation  Home Environment: Private residence  # Steps to Enter: 1 (high step)  Rails to Enter: Yes (pole)  One/Two Story Residence: Two story  # of Interior Steps: 12 (6 up & 6 down)  Living Alone: No  Support Systems: Spouse/Significant Other  Patient Expects to be Discharged to[de-identified] Home  Current DME Used/Available at Home: Cane, straight  Tub or Shower Type: Shower    EXAMINATION/PRESENTATION/DECISION MAKING:     Critical Behavior:  Neurologic State: Alert  Orientation Level: Oriented X4  Cognition: Follows commands,Appropriate for age attention/concentration,Appropriate safety awareness     Hearing:   Auditory  Auditory Impairment: None  Skin:  [place], Arterial, MAC, Peripheral, LVAD, 4L NCO2    Range Of Motion:  AROM: Generally decreased, functional           PROM: Generally decreased, functional           Strength:    Strength: Generally decreased, functional                    Tone & Sensation:   Tone: Normal              Sensation: Intact               Coordination:  Coordination: Within functional limits  Functional Mobility:  Bed Mobility:  Supine to Sit: Minimum assistance;Assist x2; Additional time;Bed Modified  Sit to Supine: Other (comment) (n/t* remained sitting up in the chair)  Scooting: Minimum assistance; Additional time   Tx: instruction and cues for technique and mindful based movement principles. Transfers:  Sit to Stand: Minimum assistance;Assist x2  Stand to Sit: Minimum assistance;Assist x2 (uncontrolled descent)        Bed to Chair: Minimum assistance;Assist x2; Other (comment); Additional time (w/ assist of another to manage lines)  Tx: instruction and cues for technique for mindful based movements. Balance:   Sitting: Intact; Without support  Standing: Impaired; Without support  Standing - Static: Fair;Occasional  Standing - Dynamic : Fair;Constant support  Ambulation/Gait Training:  Ambulated a few steps (shortened w/ decreased foot clearance) from bed to chair with Min A x2 and assist of another to manage lines. Cardiac diagnosis intervention:  Patient instructed and educated on mindful movement principles based on Move in The Tube concept to include maintaining bilateral elbows close to rib cage when performing any load-bearing activity such as getting in/out of bed, pushing up from a chair.     Functional Measure:  Tinetti test:    Sitting Balance: 1  Arises: 0  Attempts to Rise: 0  Immediate Standing Balance: 0  Standing Balance: 1  Nudged: 0 (inferred)  Eyes Closed: 0 (inferred)  Turn 360 Degrees - Continuous/Discontinuous: 0  Turn 360 Degrees - Steady/Unsteady: 0  Sitting Down: 0  Balance Score: 2 Balance total score  Indication of Gait: 0  R Step Length/Height: 0  L Step Length/Height: 0  R Foot Clearance: 0  L Foot Clearance: 0  Step Symmetry: 0  Step Continuity: 0  Path: 0  Trunk: 0  Walking Time: 0  Gait Score: 0 Gait total score  Total Score: 2/28 Overall total score         Tinetti Tool Score Risk of Falls  <19 = High Fall Risk  19-24 = Moderate Fall Risk  25-28 = Low Fall Risk  Tinetti ME. Performance-Oriented Assessment of Mobility Problems in Elderly Patients. [facility] 66; [id number]. (Scoring Description: PT Bulletin Feb. 10, 1993)    Older adults: Mirian Young et al, 2009; n = 1601 S zulily elderly evaluated with ABC, GERA, ADL, and IADL)  · Mean GERA score for males aged 69-68 years = 26.21(3.40)  · Mean GERA score for females age 69-68 years = 25.16(4.30)  · Mean GERA score for males over 80 years = 23.29(6.02)  · Mean GERA score for females over 80 years = 17.20(8.32)            Physical Therapy Evaluation Charge Determination   History Examination Presentation Decision-Making   LOW Complexity : Zero comorbidities / personal factors that will impact the outcome / POC MEDIUM Complexity : 3 Standardized tests and measures addressing body structure, function, activity limitation and / or participation in recreation  MEDIUM Complexity : Evolving with changing characteristics  Other outcome measures tinetti 2/28  HIGH       Based on the above components, the patient evaluation is determined to be of the following complexity level: LOW     Pain Rating:  Post op    Activity Tolerance:   Fair, requires rest breaks, coughing, SpO2 stable on 4L NCO2, SvO2 25% w/ activity (RN aware)    After treatment patient left in no apparent distress:   Sitting in chair, Call bell within reach, and RN in the room assisting    COMMUNICATION/EDUCATION:   The patients plan of care was discussed with: Occupational therapist and Registered nurse.      Fall prevention education was provided and the patient/caregiver indicated understanding., Patient/family have participated as able in goal setting and plan of care., and Patient/family agree to work toward stated goals and plan of care.     Thank you for this referral.  Mabel Dye, PT   Time Calculation: 30 mins

## 2022-04-08 NOTE — PROGRESS NOTES
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 Intra-op course course complicated by bleeding requiring multiple rounds of pRBC's, platelets, FFP, cellsaver, and cryoprecipitate. Intra-op CHEL significant for moderate to severe RV dysfunction. ICU issues include:    1) Cardiogenic shock    General/neuro: The patient is alert and oriented. No acute issues. Respiratory: Extubated, postop day 1. Encourage mobility, incentive spirometry, respiratory hygiene measures. Cardiac: Status post HeartMate 3 LVAD and aortic valve closure, day 3. Dobutamine and epinephrine at 6 mcg and 2 mcg respectively for inotropic support. Nicardipine infusion targeting MAP 70-75. Flows at 4600 RPMs, no device related issues. Further management as per heart failure service. GI: Advance diet as tolerated. ID: White count stable at 8.6. No need for antibiotic therapy. Renal: Creatinine at 0.91 from 1.13. Not ready for diuresis yet. Prophylaxis: Weight-based heparin or therapeutic anticoagulation. Subjective:   Progress Note: 2022      Reason for ICU Admission: Post LVAD    HPI: As Above    Overnight Events:   2022      POD:  Day of Surgery    S/P:   Procedure(s):  REDO STERNOTOMY; RIGHT GROIN CUTDOWN FOR CANNULATION;  INSERTION OF LEFT VENTRICULAR ASSIST DEVICE (HMIII); AORTIC VALVE CLOSURE CHEL BY DR. Eli Hilliard.     Active Problem List:     Problem List  Date Reviewed: 3/17/2022          Codes Class    S/P ventricular assist device [facility]) ICD-10-CM: Z95.811  ICD-9-CM: V45.89     Overview Signed 4/5/2022  2:22 PM by YOLI Tony     REDO STERNOTOMY   RIGHT GROIN CUTDOWN FOR CANNULATION with access of CVF and CFA  INSERTION OF LEFT VENTRICULAR ASSIST DEVICE (HMIII)  AORTIC VALVE CLOSURE with Park's stitch             HFrEF (heart failure with reduced ejection fraction) ([facility]) ICD-10-CM: I50.20  ICD-9-CM: 428.20         NSTEMI (non-ST elevated myocardial infarction) ([facility]) ICD-10-CM: I21.4  ICD-9-CM: 410.70         Acute on chronic clinical systolic heart failure ([facility]) ICD-10-CM: I50.23  ICD-9-CM: 428.23         Active advance directive on file ICD-10-CM: Z78.9  ICD-9-CM: V49.89         Type 2 diabetes, controlled, with peripheral circulatory disorder ([facility]) ICD-10-CM: E11.51  ICD-9-CM: 250.70, 443.81         CHF, stage C ([facility] ICD-10-CM: I50.9  ICD-9-CM: 428.0         Mixed hyperlipidemia ICD-10-CM: E78.2  ICD-9-CM: 272.2         Proteinuria ICD-10-CM: R80.9  ICD-9-CM: 791.0         BPH without obstruction/lower urinary tract symptoms ICD-10-CM: N40.0  ICD-9-CM: 600.00         Hematuria, gross ICD-10-CM: R31.0  ICD-9-CM: 599.71         Cardiac arrest ([facility] ICD-10-CM: I46.9  ICD-9-CM: 427.5         Insomnia, unspecified ICD-10-CM: G47.00  ICD-9-CM: 780.52         Encounter for long-term (current) use of other medications ICD-10-CM: Z79.899  ICD-9-CM: V58.69         Calculus of kidney ICD-10-CM: N20.0  ICD-9-CM: 592.0         Coronary atherosclerosis of native coronary artery ICD-10-CM: I25.10  ICD-9-CM: 414.01         Benign neoplasm of colon ICD-10-CM: D12.6  ICD-9-CM: 211.3         Unspecified sleep apnea ICD-10-CM: G47.30  ICD-9-CM: 780.57         Reflux esophagitis ICD-10-CM: K21.00  ICD-9-CM: 530.11               Past Medical History:      has a past medical history of CAD (coronary artery disease) ('90 '97, '13 x 2), Calculus of kidney, Colonic polyps, Congestive heart failure, unspecified, Diabetes ([facility] [address] Gastritis, Hypercholesterolemia, and Sleep apnea. Past Surgical History:      has a past surgical history that includes hx heent; colonoscopy (04/06/2011); endoscopy, colon, diagnostic; hx coronary artery bypass graft (8/22/12); hx pacemaker placement (1/30/13); pr cardiac surg procedure unlist (2012); and colonoscopy (N/A, 3/2/2022). Home Medications:     Prior to Admission medications    Medication Sig Start Date End Date Taking? Authorizing Provider   potassium chloride SR (K-TAB) 20 mEq tablet Take 2 Tablets by mouth two (2) times a day. 3/9/22  Yes Ajit Wong NP   milrinone (PRIMACOR) 20 mg/100 mL (200 mcg/mL) infusion 28.5 mcg/min by IntraVENous route continuous. 3/3/22  Yes Ajit Wong NP   tamsulosin (FLOMAX) 0.4 mg capsule TAKE 1 CAPSULE DAILY 3/2/22  Yes Rachelle Gustafson MD   bumetanide (BUMEX) 2 mg tablet Take 1 Tablet by mouth two (2) times a day. Patient taking differently: Take 2 mg by mouth two (2) times a day. 2 mg in am 1mg in evening 2/11/22  Yes Kiara Hanna NP   prasugreL (Effient) 10 mg tablet Take 10 mg by mouth daily. Yes Provider, Historical   metFORMIN (GLUCOPHAGE) 500 mg tablet Take 1 Tablet by mouth two (2) times daily (with meals). 1/11/22  Yes Rachelle Gustafson MD   ranolazine ER (Ranexa) 500 mg SR tablet Take 1 Tablet by mouth two (2) times a day. 1/11/22  Yes Rachelle Gustafson MD   eplerenone (INSPRA) 50 mg tab tablet Take 1 Tablet by mouth daily. 1/11/22  Yes Rachelle Gustafson MD   isosorbide mononitrate ER (IMDUR) 30 mg tablet Take 1 Tablet by mouth every twelve (12) hours. 1/4/22  Yes Kristopher Mitchell NP   hydrALAZINE (APRESOLINE) 50 mg tablet TAKE 1 TABLET BY MOUTH THREE TIMES DAILY 12/17/21  Yes Nate Stewart NP   dapagliflozin [place]) 10 mg tab tablet Take 1 Tablet by mouth daily.  11/12/21  Yes Arlen Blankenship NP   omeprazole (PRILOSEC) 20 mg capsule TAKE 1 CAPSULE BY MOUTH DAILY FOR INDIGESTION 7/6/21  Yes Kathy PAREDES NP   rosuvastatin (CRESTOR) 10 mg tablet TAKE 1 TABLET NIGHTLY 4/26/21  Yes Nirali Gustafson MD   aspirin delayed-release 81 mg tablet Take 81 mg by mouth daily. Yes Provider, Historical   benzonatate (TESSALON) 200 mg capsule Take 200 mg by mouth three (3) times daily as needed for Cough. Patient not taking: Reported on 3/7/2022    Provider, Historical   diphenhydrAMINE (Benadryl Allergy) 25 mg tablet Take 25 mg by mouth every six (6) hours as needed. Needed every night d/t picc line causing itching  Patient not taking: Reported on 4/3/2022    Provider, Historical   glucose blood VI test strips (OneTouch Ultra Test) strip USE TO TEST BLOOD SUGAR DAILY 7/20/21   Nirali Gustafson MD   OneTouch Delica Plus Lancet 33 gauge misc USE TO TEST BLOOD SUGAR DAILY 7/20/21   Nirali Gustafson MD   lancets misc Use to test blood sugar daily 6/16/20   Niarli Gustafson MD   acetaminophen (TYLENOL EXTRA STRENGTH) 500 mg tablet Take  by mouth every six (6) hours as needed. Patient not taking: Reported on 3/17/2022    Provider, Historical       Allergies/Social/Family History: Allergies   Allergen Reactions    Oxycodone Anaphylaxis    Pcn [Penicillins] Hives      Social History     Tobacco Use    Smoking status: Never Smoker    Smokeless tobacco: Never Used   Substance Use Topics    Alcohol use: Yes     Alcohol/week: 0.0 standard drinks     Comment:  VERY RARE      Family History   Problem Relation Age of Onset    Heart Disease Mother         MI    Heart Disease Father         CAD & PVD    Lung Disease Father     Cancer Father         lung    Diabetes Maternal Grandmother     Heart Disease Other         CAD    Stroke Sister        Review of Systems:     A comprehensive review of systems was negative except for that written in the HPI.     Objective:   Vital Signs:  Visit Vitals  BP (!) 89/61   Pulse 75   Temp 98.4 °F (36.9 °C)   Resp 22   Ht 6' 1\" (1.854 m)   Wt 88.9 kg (195 lb 15.8 oz)   SpO2 93%   BMI 25.86 kg/m²    O2 Flow Rate (L/min): 4 l/min O2 Device: Nasal cannula Temp (24hrs), Av.3 °F (36.8 °C), Min:98 °F (36.7 °C), Max:98.5 °F (36.9 °C)    CVP (mmHg): 16 mmHg (22 0900)      Intake/Output:     Intake/Output Summary (Last 24 hours) at 2022 8841  Last data filed at 2022 0900  Gross per 24 hour   Intake 4497.56 ml   Output 2497 ml   Net 2000.56 ml       Physical Exam:    General appearance: no distress, appears stated age  Respiratory: Decreased breath sounds b/l  Cardiac: Normal Sinus rhythm   Abdomen: soft, hypoactive sounds  Extremities: warm, perfused. LABS AND  DATA: Personally reviewed  Recent Labs     22   WBC 8.6  --   --  8.7   HGB 7.5* 7.7*   < > 7.0*   HCT 23.0* 23.5*   < > 22.1*   PLT 99*  --   --  103*    < > = values in this interval not displayed. Recent Labs     22  13222   * 134*   < > 134*   K 4.0 3.8   < > 4.3    104   < > 105   CO2 23 22   < > 22   BUN 25* 22*   < > 20   CREA 0.91 1.13   < > 1.11   GLU 91 206*   < > 145*   CA 8.3* 8.3*   < > 8.5   MG 2.2  --   --  2.0    < > = values in this interval not displayed. Recent Labs     22  132   AP 82 80   TP 6.0* 6.2*   ALB 3.0* 3.4*   GLOB 3.0 2.8     Recent Labs     22   INR 1.2* 1.3*   PTP 12.0* 13.6*   APTT 50.1* 72.3*      Recent Labs     22  1418 22  0424 22  1532 22  1532   PHI 7.45 7.42   < > 7.43   PCO2I 30.9* 35.0   < > 36.3   PO2I 143* 408*   < > 641*   FIO2I  --  80  --  100    < > = values in this interval not displayed. No results for input(s): CPK, CKMB, TROIQ, BNPP in the last 72 hours.     Hemodynamics:   PAP: PAP Systolic: 30 ( 6536) CO: CO (l/min): 5.3 l/min (22 0900)   Wedge:   CI: CI (l/min/m2): 2.7 l/min/m2 (22 0900)   CVP:  CVP (mmHg): 16 mmHg (22) SVR:       PVR:       Ventilator Settings:  Mode Rate Tidal Volume Pressure FiO2 PEEP   Spontaneous,Pressure support   520 ml  5 cm H2O 40 % 5 cm H20     Peak airway pressure: 18 cm H2O    Minute ventilation: 7.71 l/min        MEDS: Reviewed    Chest X-Ray:  CXR Results  (Last 48 hours)               04/08/22 0424  XR CHEST PORT Final result    Impression:  1. No significant change in the appearance of the chest or support hardware. Narrative:  INDICATION: . HF   COMPARISON: Previous chest xray, yesterday. LIMITATIONS: Portable technique. Narcisa Maid FINDINGS: Single frontal view of the chest.    .   Lines/tubes/surgical: No significant change in the appearance of the right IJ   approach Forsyth-Senthil catheter, mediastinal/chest drains, LVAD and cardiac assist   device. Heart/mediastinum: Unremarkable. Lungs/pleura: Hazy opacity over both bases. No visible pneumothorax. Additional Comments: Skinfold overlying the right chest.    . 04/07/22 0449  XR CHEST PORT Final result    Impression:  1. No significant change in the appearance of the chest or support hardware. Narrative:  INDICATION: . HF   COMPARISON: Previous chest xray, yesterday. LIMITATIONS: Portable technique. Narcisa Maid FINDINGS: Single frontal view of the chest.    .   Lines/tubes/surgical: No significant change in the appearance of the right IJ   approach Forsyth-Senthil catheter, LVAD, cardiac assist device and mediastinal/chest   drains. Heart/mediastinum: Unremarkable. Lungs/pleura: Bibasilar opacities. No visible pneumothorax. Additional Comments: None. Narcisa Maid ECHO:        Multidisciplinary Rounds Completed: Yes    ABCDEF Bundle/Checklist Completed:  Yes    SPECIAL EQUIPMENT  PA Catheter    DISPOSITION  Stay in ICU    CRITICAL CARE CONSULTANT NOTE  I had a face to face encounter with the patient, reviewed and interpreted patient data including clinical events, labs, images, vital signs, I/O's, and examined patient.   I have discussed the case and the plan and management of the patient's care with the consulting services, the bedside nurses and the respiratory therapist.      NOTE OF PERSONAL INVOLVEMENT IN CARE   This patient has a high probability of imminent, clinically significant deterioration, which requires the highest level of preparedness to intervene urgently. I participated in the decision-making and personally managed or directed the management of the following life and organ supporting interventions that required my frequent assessment to treat or prevent imminent deterioration. I personally spent 50 minutes of critical care time. This is time spent at this critically ill patient's bedside actively involved in patient care as well as the coordination of care. This does not include any procedural time which has been billed separately.     Yann Dias DO  Staff Intensivist/Anesthesiologist  [facility]  4/8/2022

## 2022-04-08 NOTE — PROGRESS NOTES
[address]: Attempted to meet with patient for LVAD education. Family and provider rounding at bedside. Will attempt to meet with patient later this afternoon. 1530: Attempted to meet with patient and wife for LVAD education. Patient sleeping. Wife stated he just changed positions and attempting to get rest after an unrestful night. She stated can touch base on Monday for education. Time given to ask questions. Wife had no further questions at this time. Will continue to follow for ongoing LVAD education and support. Desean Garcia RN.

## 2022-04-08 NOTE — PROGRESS NOTES
Problem: Self Care Deficits Care Plan (Adult)  Goal: *Acute Goals and Plan of Care (Insert Text)  Description: FUNCTIONAL STATUS PRIOR TO ADMISSION: pt lives with wife, independent prior    HOME SUPPORT: The patient lived with wife but did not require assist.    Occupational Therapy Goals  Initiated 4/8/2022  1. Patient will perform ADLs standing 5 mins without fatigue or LOB with supervision/set-up within 7 day(s). 2.  Patient will perform lower body ADLs with minimal assistance/contact guard assist within 7 day(s). 3.  Patient will perform upper body ADLs with minimal assistance/contact guard assist within 7 day(s). 4.  Patient will perform toilet transfers with minimal assistance/contact guard assist within 7 day(s). 5.  Patient will perform all aspects of toileting with minimal assistance/contact guard assist within 7 day(s). 6.  Patient will participate in cardiac/sternal upper extremity therapeutic exercise/activities to increase independence with ADLs with supervision/set-up for 5 minutes within 7 day(s). 7.  Patient will perform VAD switchover routine as part of ADL routine (including batteries<>batteries, battery clip<>battery clip, mock SC<>SC) with minimal assistance/contact guard assist within 7 days. Outcome: Not Met       OCCUPATIONAL THERAPY EVALUATION  Patient: Prince Lindo (59 y.o. male)  Date: 4/8/2022  Primary Diagnosis: HFrEF (heart failure with reduced ejection fraction) ([facility]) [I50.20]  Procedure(s) (LRB):  REDO STERNOTOMY; RIGHT GROIN CUTDOWN FOR CANNULATION;  INSERTION OF LEFT VENTRICULAR ASSIST DEVICE (HMIII);  AORTIC VALVE CLOSURE CHEL BY DR. Peter Oropeza. (N/A) 3 Days Post-Op   Precautions:   Fall,Sternal (LVAD)    ASSESSMENT  Based on the objective data described below, the patient presents with decreased cardiopulmonary endurance, decreased activity tolerance, expected post op sternal pain, limitations with lines/leads (ex: Marni Beat, arterial line, PIV, MAC) and decline in functional status for ADLs/IADLs s/p POD 3 LVAD and sternotomy. Pt alert, pleasant and motivated to work with therapy. Pt was min A x 2 for supine to sit with intact sitting balance. Reviewed move in the tube with min cues for adherence during session. Min A x 2 sit<>stand to chair due to limitations with lines. SvO2 decreased from 38% to 25% with activity but all other VSS. Anticipate excellent progress with therapy. .    Patient is not verbalizing and is demonstrating understanding of mindful-based movements (\"move in the tube\") principles of keeping UEs proximal to ribcage to prevent lateral pull on the sternum during load-bearing activities with verbal cues required for compliance. Current Level of Function Impacting Discharge (ADLs/self-care): min A x 2, new LVAD, lines and leads    Functional Outcome Measure: The patient scored 20 on the barthel outcome measure. Other factors to consider for discharge: from home,  CABG 2021     Patient will benefit from skilled therapy intervention to address the above noted impairments. PLAN :  Recommendations and Planned Interventions: self care training, functional mobility training, therapeutic exercise, balance training, therapeutic activities, endurance activities, and neuromuscular re-education    Frequency/Duration: Patient will be followed by occupational therapy 5 times a week to address goals. Recommendation for discharge: (in order for the patient to meet his/her long term goals)  To be determined: hopeful for HHOT/PT    This discharge recommendation:  Has not yet been discussed the attending provider and/or case management    IF patient discharges home will need the following DME: TBD       SUBJECTIVE:   Patient stated I didn't need that. I did that all on my own.     OBJECTIVE DATA SUMMARY:   HISTORY:   Past Medical History:   Diagnosis Date    CAD (coronary artery disease) '90 '97, '13 x 2    MI, code ice in 2013 at [facility]    Calculus of kidney     Colonic polyps     Congestive heart failure, unspecified     Diabetes ([facility] [address]     Gastritis     Hypercholesterolemia     Sleep apnea      Past Surgical History:   Procedure Laterality Date    COLONOSCOPY  04/06/2011    16, due 21    COLONOSCOPY N/A 3/2/2022    COLONOSCOPY    :- performed by Imelda King MD at [facility] ENDOSCOPY    ENDOSCOPY, COLON, DIAGNOSTIC      11, due 16    HX CORONARY ARTERY BYPASS GRAFT  8/22/12    x 4 vessel by MISSY Ramirez    HX TON      LASIK    HX PACEMAKER PLACEMENT  1/30/13    [place] CARDIAC SURG PROCEDURE UNLIST  2012    x 4 vessels       Expanded or extensive additional review of patient history:     Home Situation  Home Environment: Private residence  # Steps to Enter: 1 (high step)  Rails [employer] Metrics: Yes (pole)  One/Two Story Residence: Two story  # of Interior Steps: 12 (6 up & 6 down)  Living Alone: No  Support Systems: Spouse/Significant Other  Patient Expects to be Discharged to[de-identified] Home  Current DME Used/Available at Home: Cane, straight  Tub or Shower Type: Shower    Hand dominance: Right    EXAMINATION OF PERFORMANCE DEFICITS:  Cognitive/Behavioral Status:  Neurologic State: Alert                   Skin: all lines and leads intact    Edema: none    Hearing: Auditory  Auditory Impairment: None    Vision/Perceptual:                           Acuity: Within Defined Limits         Range of Motion:    AROM: Generally decreased, functional  PROM: Generally decreased, functional                      Strength:    Strength: Generally decreased, functional                Coordination:  Coordination: Within functional limits  Fine Motor Skills-Upper: Left Intact; Right Intact    Gross Motor Skills-Upper: Left Intact; Right Intact    Tone & Sensation:    Tone: Normal  Sensation: Intact                      Balance:  Sitting: Intact; Without support  Standing: Impaired; Without support  Standing - Static: Fair;Occasional  Standing - Dynamic : Fair;Constant support    Functional Mobility and Transfers for ADLs:  Bed Mobility:  Supine to Sit: Minimum assistance;Assist x2; Additional time;Bed Modified  Sit to Supine: Other (comment) (n/t* remained sitting up in the chair)  Scooting: Minimum assistance; Additional time    Transfers:  Sit to Stand: Minimum assistance;Assist x2  Stand to Sit: Minimum assistance;Assist x2 (uncontrolled descent)  Bed to Chair: Minimum assistance;Assist x2; Other (comment); Additional time (w/ assist of another to manage lines)    ADL Assessment:  Feeding: Setup    Oral Facial Hygiene/Grooming: Setup    Bathing: Moderate assistance    Upper Body Dressing: Moderate assistance    Lower Body Dressing: Maximum assistance    Toileting: Total assistance                ADL Intervention and task modifications:       Patient instructed and educated on mindful movement principles based on Move in The Tube concept to include maintaining bilateral elbows close to rib cage when performing any load-bearing activity such as getting in/out of bed, pushing up from a chair, opening a door, or lifting a box. Pt able to state  without cueing or prompting. Functional Measure:    Barthel Index:  Bathin  Bladder: 0  Bowels: 5  Groomin  Dressin  Feedin  Mobility: 0  Stairs: 0  Toilet Use: 0  Transfer (Bed to Chair and Back): 10  Total: 20/100      The Barthel ADL Index: Guidelines  1. The index should be used as a record of what a patient does, not as a record of what a patient could do. 2. The main aim is to establish degree of independence from any help, physical or verbal, however minor and for whatever reason. 3. The need for supervision renders the patient not independent. 4. A patient's performance should be established using the best available evidence. Asking the patient, friends/relatives and nurses are the usual sources, but direct observation and common sense are also important. However direct testing is not needed.   5. Usually the patient's performance over the preceding 24-48 hours is important, but occasionally longer periods will be relevant. 6. Middle categories imply that the patient supplies over 50 per cent of the effort. 7. Use of aids to be independent is allowed. Score Interpretation (from [facility])    Independent   60-79 Minimally independent   40-59 Partially dependent   20-39 Very dependent   <20 Totally dependent     -Lesa Escamilla., BarthelVINCE. (1965). Functional evaluation: the Barthel Index. [address] ([address] (1997). The Barthel activities of daily living index: self-reporting versus actual performance in the old (> or = 75 years). Journal of [address] [place] [address] 45(7), [facility], ALEXANDER, Miller Davison., Aida Valdez. (1999). Measuring the change in disability after inpatient rehabilitation; comparison of the responsiveness of the Barthel Index and Functional Navarro Measure. Journal of Neurology, Neurosurgery, and Psychiatry, 66(4), 382-585. Lani James, N.J.A, EDIE Nino, & Wilberto Driscoll MGENO. (2004) Assessment of post-stroke quality of life in cost-effectiveness studies: The usefulness of the Barthel Index and the EuroQoL-5D. Quality of Life Research, 15, 729-58         Occupational Therapy Evaluation Charge Determination   History Examination Decision-Making   LOW Complexity : Brief history review  MEDIUM Complexity : 3-5 performance deficits relating to physical, cognitive , or psychosocial skils that result in activity limitations and / or participation restrictions MEDIUM Complexity : Patient may present with comorbidities that affect occupational performnce.  Miniml to moderate modification of tasks or assistance (eg, physical or verbal ) with assesment(s) is necessary to enable patient to complete evaluation       Based on the above components, the patient evaluation is determined to be of the following complexity level: LOW   Pain Rating:  none    Activity Tolerance:   Fair  Svo2 38% to 25% with activity    After treatment patient left in no apparent distress:    Sitting in chair and Call bell within reach    COMMUNICATION/EDUCATION:   The patients plan of care was discussed with: Physical therapist and Registered nurse. Patient/family have participated as able in goal setting and plan of care. This patients plan of care is appropriate for delegation to [facility].     Thank you for this referral.  Sarahy Jackson OT  Time Calculation: 30 mins

## 2022-04-08 NOTE — DIABETES MGMT
[facility]    CLINICAL NURSE SPECIALIST CONSULT     Initial Presentation   [address] Tristan Barton is a 69 y.o. male who was admitted 4/3/22 for medical optimization of severe ischemic cardiomyopathy with heart failure with reduced ejection fraction prior to LVAD implantation. HX:   Past Medical History:   Diagnosis Date    CAD (coronary artery disease) '90 '97, '13 x 2    [place], code ice in 2013 at [facility]    Calculus of kidney     Colonic polyps     Congestive heart failure, unspecified     Diabetes ([facility] [address]     Gastritis     Hypercholesterolemia     Sleep apnea         INITIAL DX:   HFrEF (heart failure with reduced ejection fraction) ([facility] [address] [I50.20]     Current Treatment     TX: Diuretic adjustment, antibiotics, milrinone    Consulted by Amaris Huntley NP for advanced diabetes nursing assessment and care for:   [x] Inpatient management strategy    Hospital Course   Clinical progress has been uncomplicated. 4/3: Admission  4/5: LVAD implant  4/6: Extubated  4/7: Lactate elevated, TTE- dilated LV cavity, RV with mod dysfunction, Increased LVAD speed. Increased LFTs with hepatic congestion 3  4/8: Blood transfusion   Diabetes History   Type 2 Diabetes- A1C 6.8%  Ambulatory BG management provided by: David Sawant MD PCP  Family history positive for diabetes: Maternal Grandmother with DM2       Diabetes-related Medical History  Neurological complications  Peripheral neuropathy  Macrovascular disease  CAD and Myocardial infarction  Other associated conditions     CHF, Sleep Apnea    Diabetes Medication History  Key Antihyperglycemic Medications             metFORMIN (GLUCOPHAGE) 500 mg tablet (Taking) Take 1 Tablet by mouth two (2) times daily (with meals). dapagliflozin (Farxiga) 10 mg tab tablet (Taking) Take 1 Tablet by mouth daily.            Diabetes self-management practices:   Eating pattern  [x]? Breakfast         Eggs, toast, oatmeal  [x]?         Lunch Sometimes skips or piece of fruit  [x]? Dinner              Chicken/steak with a sweet potato or bowl of soup  [x]? Bedtime           Fruit  [x]? Snacks            Fruit  [x]? Beverages       Water  Physical activity pattern  Limited with HF  Monitoring pattern  Tests 2-3 times weekly  When testing, will see 106-135  Taking medications pattern  [x]? Consistent administration  [x]? Affordable  Social determinants of health impacting diabetes self-management practices   Concerned that you need to know more about how to stay healthy with diabetes  Overall evaluation:               [x]? Achieving A1c target with drug therapy & self-care practices    Subjective   I am ok.       On home inotropes since Nov  Objective   Physical exam  General           Normal weight male in acute post-op pain. Conversant and cooperative  Neuro  Alert, oriented, weak, tired  Vital Signs   Visit Vitals  BP (!) 89/61   Pulse 78   Temp 98.4 °F (36.9 °C)   Resp 21   Ht 6' 1\" (1.854 m)   Wt 88.5 kg (195 lb)   SpO2 95%   BMI 25.73 kg/m²     Skin  Warm and dry. No acanthosis noted along neckline. No lipohypertrophy or lipoatrophy noted at injection sites   Heart   Regular rate and rhythm. No murmurs, rubs or gallops  Lungs  Clear to auscultation without rales or rhonchi  Extremities No foot wounds      Laboratory  Recent Labs     04/08/22  0308 04/07/22  1325 04/07/22  0316 04/06/22  0308 04/06/22  0308   GLU 91 206* 145*   < > 115*   AGAP 6 8 7   < > 6   WBC 8.6  --  8.7  --  5.9   CREA 0.91 1.13 1.11   < > 0.94   GFRNA >60 >60 >60   < > >60   * 189* 206*   < > 140*   ALT 31 29 28   < > 17    < > = values in this interval not displayed.        Factors impacting BG management  Factor Dose Comments   Nutrition:  Standard meals       60 grams/meal        CHF On chronic milrinone Impaired insulin delivery         Blood glucose pattern      Significant diabetes-related events over the past 24-72 hours  A1C 6.8%  LVAD 4/5  Lac 2.6,   Full liq diet  On insulin, dobutamine, epi, heparin, cardene amio  On insulin gtt:   4/5: 21.8 units post-op  4/6: 25.8 units  4/7: 116.9 units  4/8: 15.6 units since MN     Assessment and Plan   Nursing Diagnosis Risk for unstable blood glucose pattern   Nursing Intervention Domain 5255 Decision-making Support   Nursing Interventions Examined current inpatient diabetes/blood glucose control   Explored factors facilitating and impeding inpatient management  Explored corrective strategies with patient and responsible inpatient provider   Informed patient of rational for insulin strategy while hospitalized     Evaluation   Rahul Davis is a 69year old gentleman, with controlled Type 2 Diabetes, admitted for medical optimization for severe ischemic cardiomyopathy and heart failure with reduced ejection fraction prior to LVAD implant. A1C on admission was 6.8% and BG was in goal without the need for anti-hyperglycemic agents pre-op. Following LVAD implant, he was started on an insulin gtt which has maintained glucose in goal but with elevated insulin rates in the setting of stress hyperglycemia, increased hepatic congestion and acidosis. Insulin sensitivity is much improved today and insulin rates decreased. Reasonable to transition off insulin gtt today. Once off insulin gtt, please target an inpatient -180mg/dl. Recommendations   Transition off insulin gtt:  1. Lantus dosing per CTS protocol. Give the first dose now then turn off insulin gtt 2 hrs later    2. POC glucose ACHS    3. Continue consistent carbohydrate portion of diet (60 grams CHO/meal)    4. Correctional humalog ACHS    5.  If BG sustained over 200 when off insulin gtt, continue low dose basal insulin: 0.2units/kg/day  Billing Code(s)   [x] 97372    Before making these care recommendations, I personally reviewed the hospitalization record, including notes, laboratory & diagnostic data and current medications, and examined the patient at the bedside (circumstances permitting) before making care recommendations. More than fifty (50) percent of the time was spent in patient counseling and/or care coordination.   Total minutes: 13       FAROOQ Hi  Diabetes Clinical Nurse Specialist  [facility]  Access via [employer]

## 2022-04-08 NOTE — PROGRESS NOTES
[facility] in [place], [place]  Heart Failure Inpatient Progress Note     Patient name: Faraz Lucas Sr  Patient : 1945  Patient MRN: [id number]  Date of service: 22    Reason for Referral:  Management of LVAD, HFrEF     PLAN OF CARE:   · Severe ischemic cardiomyopathy, LVEF 15-20%; stage D, NYHA class IV symptoms; s/p LVAD implant with HM3 as DT  (22)  · Was re-do sternotomy, bleeding intra-op, required cryo, k-centra, FFP, Plt, and cellsaver in OR  · RV dysfunction noted intra-op; requiring lower VAD speed and dual inotrope.      RECOMMENDATIONS:   POD # 3 from LVAD Implant  Set Speed 4800rpms, low speed 4400  Continue dobut, do not titrate today  Decrease Epi to 1mcg- consider DC later today   Continue cardene gtt for afterload reduction  Bumex PRN CVP > 12  Increase hydralazine to 50mg PO QID  PRN IV hydralazine 5 or 10mg Q6hrs   Goal MAP 65-75 due to pt being sensitive to higher afterload  Cont Albumin BID  1 unit PRBC today   Keep Hgb > 7  Keep K > 4, Mag > 2- on electrolyte protocol   No GDMT d/t recent surgery  Heparin gtt per protocol  Start coumadin 2.5mg tonight, goal INR 2-3  ASA 81mg daily  Start gabapentin 100mg BID  Start Toradol q 6 for 25 hours- watch renal function  Start Lidocaine patch  Will d/w pharmacy re tramadol  Ferritin > 700- no venofer- repeat next week   Driveline dressing changes daily for now until 5/3/22  Add Fentanyl patch for better pain control (pt with allergy to oxycodone)  Continue bowel regimen  Start low dose Remeron  Pulmonary toilet, monitor CT output   SSI   CPAP QHS  Advanced care plan forms on file   Strict I/O, daily weights, Na+ restricted diet   Continue VAD education     IMPRESSION:  S/p LVAD implant as DT on 22 with Dr. Melissa Nichole   chronic systolic heart failure  · Stage D, NYHA class IV symptoms improved to class II on inotropes  · Non-ischemic cardiomyopathy, LVEF 20% with LVEDD 6.2  · RV dysfunction, TAPSE 1.22 (prelimnary read)  · TBili 1.5 likely from cardiac congestion  At risk of sudden cardiac death  · Recent cardiac arrest   · S/p ICD (1/2013, [place] followed by [facility]); New implant on 10/21/2021 [employer]  Coronary artery disease  · S/p multiple interventions  · S/p 4V CABG (8/2012)  · LHC (7/2019) severe stenosis of LIMA to LAD anastomosis site, SVG graft to OM is occluded, SVG graft to RCA 40-50%, LAD occluded proximally, severe proximal LCx, RCA is the long . · PET (6/2019) EF 24% with anterior lateral and inferior reversible defect  Cardiac risk factors  · HTN  · HL  · DM2  · SRUTHI on CPAP  · MIld carotid stenosis  Anemia, microcytic  · Iron deficiency  DVT with filter  Pulmonary nodules   Dysphagia     Interval Events:  Pain management is an issue  Creatinine stable 0.9  PBNP 5700  Hgb 7.5  Low flow overnight   Dobutamine at 6  Epi at 2      CARDIAC IMAGING:  Echo (3/2/22)   · LV 10-15%  · Mod-severe MR     Echo (11/23/21):  · LV 15-20%. · Mod-severe, MR, mod-TR    Echo (5/20/21)  · LV: Estimated LVEF is 20 - 25%. Normal wall thickness. Mildly dilated left ventricle. Severely and globally reduced systolic function. Severe (grade 4) left ventricular diastolic dysfunction. · LA: Severely dilated left atrium. · RA: Severely dilated right atrium. · MV: Moderate mitral valve regurgitation is present. · TV: Moderate tricuspid valve regurgitation is present. · AO: Mild aortic root dilatation. · RV: Pacer/ICD present. · LVED 6.2cm    EKG (5/20/21) SB with 1degree AV block, QRS 116ms     C (5/24/21) Native Coronaries: LM - moderate to severe distal disease 50%. % prox occluded (), heavily calcified, LCx: 80% proximal stenosis. OM1 is  (seen filling faintly via collaterals). OM2 99% stenosis. RCA: 100% proximal occluded.  LIMA to LAD: patent, supplies only a diagonal branch (probably D2).  The LAD distal to the bifurcation is 100% occluded () and fills via right to left collaterals off the SVG to RCA. SVG to R-PDA: patent with moderate diffuse irregularities but no obstructive disease in the graft.    No appealing interventional targets.      NST as above     ICD Interrogation (11/12/2021) [employer] VVI, thoracic impedence trending up, no events, normal device function and good battery  ICD interrogation (5/12/21) [address] single lead, no events, normal device function and good battery     HEMODYNAMICS:  RHC 5/24/21 CI 1.97, PA 33/9/17, RA 1, PCWP 6- off milrinone   CPEST not done     Patient underwent a 6 minute walk test 11/12/2021    6 Min Walk Report 11/12/2021 7/6/2021 5/20/2021   (PRE) HR 88 98 74   (PRE) O2 Sat 100 - 99   (POST)  - 81   (POST) O2 Sat 99 98% on Ra 99   Distance in Meters 386.58 - 1158.24         OTHER IMAGING:  CXR (6/17/21) clear  CT 5/21/21 1. Irregular pulmonary nodule in the left upper lobe measuring 2 cm, suspicious for primary pulmonary malignancy. 2. Additional 6 mm left upper lobe pulmonary nodule. 3. Severe calcific atherosclerosis of the coronary arteries and abdominal aorta. No aneurysm. 4. Cardiomegaly. 5. No acute abnormality within the chest, abdomen, or pelvis. HISTORY OF PRESENT ILLNESS:  Briefly, Sonya Woody is a 69 y.o. male with h/o HTN, HL, DM2, SRUTHI on CPAP, chronic systolic heart failure, stage D, NYHA class IV symptoms, non-ischemic cardiomyopathy, LVEF 20% with LVEDD 6.2, RV dysfunciton, TAPSE 1.22, TBili 1.5 likely from cardiac congestion, recent cardiac arrest s/p ICD (1/2013, [address] followed by [facility]), coronary artery disease s/p multiple interventions s/p 4V CABG (8/2012), LHC (7/2019) severe stenosis of LIMA to LAD anastomosis site, SVG graft to OM is occluded, SVG graft to RCA 40-50%, LAD occluded proximally, severe proximal LCx, RCA is the long . PET (6/2019) EF 24% with anterior lateral and inferior reversible defect.  Anemia, microcytic with iron deficiency, DVT with filter.     [facility] by Dr. Cyndi Mo for evaluation of his candidacy for advanced therapies.     Patient agreed to inpatient initiation of palliative infusion of chronic inotropes and evaluation for LVAD-DT. Patient was admitted 5/2-5/25/21. Patient was started on milrinone infusion and had first part of LVAD evaluation completed. Right and left heart cath on 5/24/21 that showed severe diffuse native vessel CAD, with patent grafts (LIMA to D2, SVG to RCA).  Antiplatelet therapy was held during hospitalization and after discharge due to anticipated pulmonary nodule biopsy, bone marrow biopsy and EGD/C-Scope as part of LVAD workup.     Patient was readmitted 5/26-5/28/21 with hypertension, headache and chest pain, his troponin peaked at 10; he was shortly bridged with heparin, otherwise had normal EKG and chest CT negative for PE. Cardiology and F service was consulted and patient was discharged home after troponin came down.     Patient has now completed radiation treatment for adenocarcinoma of the lung. Hem-onc has cleared patient for VAD implant with 90% 2 year prognosis. He was most recently admitted for elective pre-op EGD and colonoscopy which he tolerated well; path negative for malignancy. He presents today for elective admission to [facility] for hemodynamic optimization prior to LVAD implant. REVIEW OF SYSTEMS:  Review of Systems   Constitutional: Negative for chills and fever. Respiratory: Negative for cough and shortness of breath. Cardiovascular: Negative for palpitations and leg swelling. Gastrointestinal: Negative for heartburn, nausea and vomiting. Musculoskeletal:        Sternal pain    Neurological: Negative for dizziness and headaches. Psychiatric/Behavioral: Negative for depression.         PHYSICAL EXAM:  Visit Vitals  BP (!) 89/61   Pulse 79   Temp 98.2 °F (36.8 °C)   Resp 18   Ht 6' 1\" (1.854 m)   Wt 195 lb (88.5 kg)   SpO2 95% BMI 25.73 kg/m²     Hemodynamics:   CO: CO (l/min): 5.5 l/min   CI: CI (l/min/m2): 2.8 l/min/m2   CVP: CVP (mmHg): 11 mmHg (04/08/22 1200)   SVR: SVR (dyne*sec)/cm5: 1055 (dyne*sec)/cm5 (04/08/22 5983)   PAP Systolic: PAP Systolic: 26 (04/43/23 4156)   PAP Diastolic: PAP Diastolic: 7 (07/27/64 4264)   PVR:     SV02: SVO2 (%): 25 % (04/08/22 1200)   SCV02:         LVAD   Pump Speed (RPM): 4800  Pump Flow (LPM): 3.6  PI (Pulsitility Index): 6  Power: 3.1   Test: Yes  Back Up  at Bedside & Labeled: Yes  Power Module Test: Yes  Driveline Site Care: No  Driveline Dressing: Clean, Dry, and Intact  Testing  Alarms Reviewed: Yes  Back up SC speed: 4800  Back up Low Speed Limit: 4400  Emergency Equipment with Patient?: Yes  Emergency procedures reviewed?: Yes  Drive line site inspected?: No  Drive line intergrity inspected?: Yes  Drive line dressing changed?: No      Physical Exam  Vitals and nursing note reviewed. Constitutional:       General: He is not in acute distress. Appearance: He is normal weight. Cardiovascular:      Rate and Rhythm: Normal rate and regular rhythm. Heart sounds: Normal heart sounds. Comments: + VAD hum  Pulmonary:      Effort: Pulmonary effort is normal. No respiratory distress. Abdominal:      General: There is no distension. Musculoskeletal:      Right lower leg: No edema. Left lower leg: No edema. Skin:     General: Skin is warm. Neurological:      General: No focal deficit present. Mental Status: He is alert and oriented to person, place, and time. Psychiatric:         Mood and Affect: Mood normal.         Behavior: Behavior normal.         Thought Content:  Thought content normal.         Judgment: Judgment normal.          PAST MEDICAL HISTORY:  Past Medical History:   Diagnosis Date    CAD (coronary artery disease) '90 '97, '13 x 2    MI, code ice in 2013 at [facility]    Calculus of kidney     Colonic polyps     Congestive heart failure, unspecified     Diabetes ([facility] [address]     Gastritis     Hypercholesterolemia     Sleep apnea        PAST SURGICAL HISTORY:  Past Surgical History:   Procedure Laterality Date    COLONOSCOPY  04/06/2011    16, due 21    COLONOSCOPY N/A 3/2/2022    COLONOSCOPY    :- performed by Poonam Herron MD at [address] [place] [address], COLON, DIAGNOSTIC      11, due 16    HX CORONARY ARTERY BYPASS GRAFT  8/22/12    x 4 vessel by MISSY Ramirez    HX HEENT      LASIK    HX PACEMAKER PLACEMENT  1/30/13    TX CARDIAC SURG PROCEDURE UNLIST  2012    x 4 vessels       FAMILY HISTORY:  Family History   Problem Relation Age of Onset    Heart Disease Mother         MI    Heart Disease Father         CAD & PVD    Lung Disease Father     Cancer Father         lung    Diabetes Maternal Grandmother     Heart Disease Other         CAD    Stroke Sister        SOCIAL HISTORY:  Social History     Socioeconomic History    Marital status:    Tobacco Use    Smoking status: Never Smoker    Smokeless tobacco: Never Used   Vaping Use    Vaping Use: Never used   Substance and Sexual Activity    Alcohol use: Yes     Alcohol/week: 0.0 standard drinks     Comment:  VERY RARE    Drug use: No       LABORATORY RESULTS:  Labs Latest Ref Rng & Units 4/8/2022 4/7/2022 4/7/2022 4/6/2022 4/5/2022 4/5/2022 4/5/2022   WBC 4.1 - 11.1 K/uL 8.6 - 8.7 5.9 6.5 6.5 -   RBC 4.10 - 5.70 M/uL 2.91(L) - 2.77(L) 3.22(L) 3.26(L) 2.95(L) -   Hemoglobin 12.1 - 17.0 g/dL 7. 5(L) 7. 7(L) 7. 0(L) 8.0(L) 8. 1(L) 7. 5(L) -   Hematocrit 36.6 - 50.3 % 23. 0(L) 23. 5(L) 22. 1(L) 25. 0(L) 25. 3(L) 23. 2(L) -   MCV 80.0 - 99.0 FL 79. 0(L) - 79. 8(L) 77. 6(L) 77. 6(L) 78. 6(L) -   Platelets 596 - 427 K/uL 99(L) - 103(L) 149(L) 129(L) 113(L) 134(L)   Lymphocytes 12 - 49 % - - - - 2(L) 5(L) -   Monocytes 5 - 13 % - - - - 2(L) 2(L) -   Eosinophils 0 - 7 % - - - - 0 0 -   Basophils 0 - 1 % - - - - 0 0 -   Bands 0 - 6 % - - - - 3 3 -   Albumin 3.5 - 5.0 g/dL 3.0(L) 3.4(L) 3.5 3. 3(L) 3.4(L) 3. 1(L) -   Calcium 8.5 - 10.1 MG/DL 8. 3(L) 8. 3(L) 8.5 8.5 8.3(L) 7. 9(L) -   Glucose 65 - 100 mg/dL 91 206(H) 145(H) 115(H) 118(H) 99 -   BUN 6 - 20 MG/DL 25(H) 22(H) 20 14 14 15 -   Creatinine 0.70 - 1.30 MG/DL 0.91 1.13 1.11 0.94 0.97 0.96 -   Sodium 136 - 145 mmol/L 133(L) 134(L) 134(L) 137 138 140 -   Potassium 3.5 - 5.1 mmol/L 4.0 3.8 4.3 4.1 4.1 3.4(L) -   TSH 0.36 - 3.74 uIU/mL - - - - - - -   LDH 85 - 241 U/L 306(H) - 329(H) 308(H) - 232 -   Some recent data might be hidden       ALLERGY:  Allergies   Allergen Reactions    Oxycodone Anaphylaxis    Pcn [Penicillins] Hives        CURRENT MEDICATIONS:    Current Facility-Administered Medications:     0.9% sodium chloride infusion 250 mL, 250 mL, IntraVENous, PRN, Terry, Danica B, NP    hydrALAZINE (APRESOLINE) tablet 50 mg, 50 mg, Oral, QID, Sandra Garcia MD, 50 mg at 04/08/22 0847    albumin human 5% (BUMINATE) solution 12.5 g, 12.5 g, IntraVENous, BID, Sandra Garcia MD, 12.5 g at 04/08/22 0955    bumetanide (BUMEX) injection 1 mg, 1 mg, IntraVENous, Q6H PRN, Sandra Garcia MD    mirtazapine (REMERON) tablet 7.5 mg, 7.5 mg, Oral, QHS, Terry, Danica B, NP    warfarin (COUMADIN) tablet 2.5 mg, 2.5 mg, Oral, ONCE, Terry, Danica B, NP    gabapentin (NEURONTIN) capsule 100 mg, 100 mg, Oral, BID, Terry, Danica B, NP    ketorolac (TORADOL) injection 15 mg, 15 mg, IntraVENous, Q6H PRN, Zeus Stewartin B, NP    lidocaine 4 % patch 1 Patch, 1 Patch, TransDERmal, Q24H, Terry, Danica B, NP    amiodarone (CORDARONE) tablet 400 mg, 400 mg, Oral, DAILY, Danica Stewart, NP    melatonin tablet 3 mg, 3 mg, Oral, QHS PRN, Chantelle Moe MD, 3 mg at 04/07/22 2136    0.9% sodium chloride infusion 250 mL, 250 mL, IntraVENous, PRN, Sandra Garcia MD    0.9% sodium chloride infusion 250 mL, 250 mL, IntraVENous, PRN, Sandra Garcia MD    hydrALAZINE (APRESOLINE) 20 mg/mL injection 10 mg, 10 mg, IntraVENous, Q6H PRN, Brando JACOBSON NP    aspirin chewable tablet 81 mg, 81 mg, Oral, DAILY, Brando JACOBSON NP, 81 mg at 04/08/22 0847    hydrALAZINE (APRESOLINE) 20 mg/mL injection 5 mg, 5 mg, IntraVENous, Q6H PRN, Brando JACOBSON NP, 5 mg at 04/08/22 1149    fentaNYL (DURAGESIC) 12 mcg/hr patch 1 Patch, 1 Patch, TransDERmal, Q72H, Brando JACOBSON NP, 1 Patch at 04/07/22 2233    sodium chloride (NS) flush 5-40 mL, 5-40 mL, IntraVENous, Q8H, Millie Wong NP, 10 mL at 04/08/22 0609    sodium chloride (NS) flush 5-40 mL, 5-40 mL, IntraVENous, PRN, Pernell Wong NP    0.9% sodium chloride infusion, 9 mL/hr, IntraVENous, CONTINUOUS, Diana Wong NP, Last Rate: 9 mL/hr at 04/08/22 0422, 9 mL/hr at 04/08/22 0422    acetaminophen (TYLENOL) tablet 650 mg, 650 mg, Oral, Q6H PRN, Pernell Wong NP, 650 mg at 04/06/22 0205    naloxone (NARCAN) injection 0.4 mg, 0.4 mg, IntraVENous, PRN, Pernell Wong NP    mupirocin (BACTROBAN) 2 % ointment, , Both Nostrils, BID, Pernell Wong NP, Given at 04/08/22 0859    ondansetron (ZOFRAN) injection 4 mg, 4 mg, IntraVENous, Q4H PRN, Diana Wong NP, 4 mg at 04/08/22 0946    albuterol-ipratropium (DUO-NEB) 2.5 MG-0.5 MG/3 ML, 3 mL, Nebulization, Q6H PRN, Pernell Wong NP    midazolam (VERSED) injection 1 mg, 1 mg, IntraVENous, Q1H PRN, Pernell Wong NP    chlorhexidine (PERIDEX) 0.12 % mouthwash 10 mL, 10 mL, Oral, BID, Diana Wong NP, 10 mL at 04/08/22 0859    senna-docusate (PERICOLACE) 8.6-50 mg per tablet 1 Tablet, 1 Tablet, Oral, DAILY, Pernell Wong NP, 1 Tablet at 04/08/22 0847    polyethylene glycol (MIRALAX) packet 17 g, 17 g, Oral, DAILY, Diana Wong NP, 17 g at 04/08/22 0847    ELECTROLYTE REPLACEMENT NOTE: Nurse to review Serum Potassium and Magnesuim levels and Initiate Electrolyte Replacement Protocol as needed, 1 Each, Other, PRN, Pernell Wong NP    insulin regular (NOVOLIN R, HUMULIN R) 100 Units in 0.9% sodium chloride 100 mL infusion, 0-50 Units/hr, IntraVENous, TITRATE, Polliard, Roselia Stagers T, NP, Last Rate: 2.1 mL/hr at 04/08/22 1153, 2.1 Units/hr at 04/08/22 1153    glucose chewable tablet 16 g, 4 Tablet, Oral, PRN, Polliard, Windy Quinhagak, NP    glucagon (GLUCAGEN) injection 1 mg, 1 mg, IntraMUSCular, PRN, Polliard, Windy Tressa, NP    insulin lispro (HUMALOG) injection, , SubCUTAneous, TIDAC, Polliard, Windy Quinhagak, NP, 3 Units at 04/07/22 1836    insulin lispro (HUMALOG) injection, , SubCUTAneous, AC&HS, Polliard, Windy Bustillos, NP    dextrose 10 % infusion 0-250 mL, 0-250 mL, IntraVENous, PRN, Polljanny, Windy Bustillos, NP, Last Rate: 999 mL/hr at 04/07/22 2145, 45 mL at 04/07/22 2145    sucralfate (CARAFATE) tablet 1 g, 1 g, Oral, AC&HS, Prabha Leesburgau, NP, 1 g at 04/08/22 1149    famotidine (PF) (PEPCID) 20 mg in 0.9% sodium chloride 10 mL injection, 20 mg, IntraVENous, Q12H, Polliard, Roselia Stagers T, NP, 20 mg at 04/08/22 0847    0.45% sodium chloride infusion, 10 mL/hr, IntraVENous, CONTINUOUS, Eli Najera MD, Last Rate: 10 mL/hr at 04/08/22 0759, 10 mL/hr at 04/08/22 0759    niCARdipine (CARDENE) 50 mg in 0.9% sodium chloride 100 mL infusion, 0-15 mg/hr, IntraVENous, TITRATE, Eli Najera MD, Last Rate: 10 mL/hr at 04/08/22 1225, 5 mg/hr at 04/08/22 1225    heparin 25,000 units in  ml infusion, 400 Units/hr, IntraVENous, CONTINUOUS, Polliard, Roselia Stagers T, NP, Last Rate: 4 mL/hr at 04/06/22 0013, 400 Units/hr at 04/06/22 0013    heparin 25,000 units in  ml infusion, 12-25 Units/kg/hr, IntraVENous, TITRATE, Millie Wong NP, Last Rate: 13.3 mL/hr at 04/08/22 1142, 17 Units/kg/hr at 04/08/22 1142    propofol (DIPRIVAN) 10 mg/mL infusion, 0-50 mcg/kg/min, IntraVENous, TITRATE, Windy Wong NP, Stopped at 04/06/22 1228    fentaNYL citrate (PF) injection 25 mcg, 25 mcg, IntraVENous, Q2H PRN, Windy Wong NP, 25 mcg at 04/08/22 0847    DOBUTamine (DOBUTREX) 1,000 mg/250 mL (4,000 mcg/mL) infusion, 0-10 mcg/kg/min, IntraVENous, TITRATE, Pamela Wong NP, Last Rate: 6.8 mL/hr at 04/08/22 0758, 6 mcg/kg/min at 04/08/22 0758    amiodarone (CORDARONE) 900 mg/250 ml D5W infusion, 0.25 mg/min, IntraVENous, TITRATE, Sandra Garcia MD, Last Rate: 4.2 mL/hr at 04/08/22 1230, 0.25 mg/min at 04/08/22 1230    dexmedeTOMidine in 0.9 % NaCl (PRECEDEX) 400 mcg/100 mL (4 mcg/mL) infusion soln, 0.1-1.5 mcg/kg/hr, IntraVENous, TITRATE, Pamela Wong NP, Stopped at 04/06/22 1351    EPINEPHrine (ADRENALIN) 10 mg in 0.9% sodium chloride 250 mL infusion, 0-10 mcg/min, IntraVENous, TITRATE, Courtney Wong NP, Last Rate: 1.5 mL/hr at 04/08/22 1059, 1 mcg/min at 04/08/22 1059    NOREPINephrine (LEVOPHED) 32,000 mcg in dextrose 5% 250 mL (128 mcg/mL) infusion, 0.5-16 mcg/min, IntraVENous, TITRATEMarvin Michell Brandy, NP, Stopped at 04/05/22 1426    PHENYLephrine (MAINOR-SYNEPHRINE) 100 mg in 0.9% sodium chloride 250 mL infusion,  mcg/min, IntraVENous, TITRATE, Pamela Wong NP    vasopressin (VASOSTRICT) 20 Units in 0.9% sodium chloride 100 mL infusion, 0-0.04 Units/min, IntraVENous, TITRMarvin MURCIA Michell Brandy, NP, Stopped at 04/05/22 1353      Thank you for your referral and allowing me to participate in this patient's care. Nathan Oro NP  [address]  Office [phone]  Fax [phone]            Total Critical Care time spent: 0930-1015atime  45 minutes. There was no overlap with other services        Critical care was necessary to treat or prevent imminent or life threatening deterioration of the following conditions: cardiac failure, respiratory failure and CNS failure or compromise     Services Provided:  1. Telemetry review and 12 lead ECG interpretation  2. Hemodynamic interpretation, assessment, and management  3.  Review and interpretation of CXR  4. Review and interpretation of lab values  5. Review and interpretation of microbiologic data and culture results  6. Review of medications and administration  7. Review and interpretation of nutrition requirements and management  8. Discussion of management withother consultants and services  9. Clinical update to family members    [facility] ATTENDING ADDENDUM    Patient was seen and examined in person. Data and notes were reviewed. I have discussed and agree with the plan as noted in the NP note above without further additions.     Jen Good MD PhD  [facility]aker [phone]

## 2022-04-08 NOTE — PROGRESS NOTES
1930: Bedside and Verbal shift change report given to Be Etienne RN (oncoming nurse) by Jerrod Iqbal RN (offgoing nurse). Report included the following information SBAR, Intake/Output, MAR, Recent Results and Cardiac Rhythm SA w/ PACs. Gtts: Dobut 6/ Epi 2/ Cardene 10/ Amio 0.5/ Heparin 14    2015: Pt's LVAD low flow alarm x1. Pump speed dropped from 4800 to 4000; resolved with no intervention. Will address with MD in AM about increasing the low speed     2115: Pt's wife called; updated by RN    2140: Pt's blood sugar 78; Insulin gtt paused; 10% dextrose given per protocol; will re-check blood sugar in 15 minutes    2206: Pt's ; insulin gtt restarted per glucose stabilizer algorithm     0414: PTT 50.1; Heparin gtt increased from 14 to 15 per admin instructions     0450: Pt's LVAD low flow alarm x1. Pump speed dropped from 4800 to 4000; MAP 69-71; resolved with no intervention. Pt became nauseous when pump speed dropped to 4000; Zofran given     0619: Pt's LVAD low flow alarm x1. Pump speed dropped from 4800 to 4500; MAP 73-75; resolved with no intervention. 0930: Pt's morning labs reviewed; Hgb 7.5; SVO2 now in low 30s; Pt symptomatic from low pump speed flows; HF paged     0640: Baldomero Herron NP called back; updated by RN on Pt condition; orders received to increase low speeds from 4000 to 4400; give 1 unit PRBCs and 1 mg Bumex     0730: Dr. Dillon Fountain at bedside; updated by RN    5133: Dr. Donna Viera at bedside; updated by RN    0745: Bedside and Verbal shift change report given to Rancho Landa RN (oncoming nurse) by Be Etienne RN (offgoing nurse).  Report included the following information SBAR, Intake/Output, MAR, Recent Results and Cardiac Rhythm SA.

## 2022-04-08 NOTE — PROGRESS NOTES
Critical Care Note      Cardiac surgery was called for the evaluation and management of: NYHA class IV A/C systolic heart failure, inotrope dependence, right ventricular dysfunction      The critical nature of the patient's condition includes the following: The patient is at imminent risk of death from NYHA class IV A/C systolic heart failure, inotrope dependence, and right ventricular dysfunction. He is at imminent risk of needing escalation of MCS and right sided MCS     Critical care diagnoses that are being treated:  NYHA class IV A/C systolic heart failure / inotrope dependence  Right ventricular dysfunction      HPI: Patient is a 70 yo with severe NYHA class IV A/C systolic heart failure, inotrope dependence, and right ventricular dysfunction. The patient is at imminent risk of death from NYHA class IV A/C systolic heart failure, inotrope dependence, and right ventricular dysfunction. He is at imminent risk of needing escalation of MCS and right sided MCS. Asked to evaluate for permanent LVAD     NYHA class IV A/C systolic heart failure / Cardiogenic shock   Remains on dual inotropic support (Dobutamine and Milrinone)  NT pro-BNP 6K  Pulmonary edema on CXR  CVP 10-15, PA 30/20's  Cardiac index 2's  MAPs 70-80s  Less issues with low flows   Discussed with HF team     Right ventricular dysfunction   Continues on dual inotropes  LFTs 100's  Likely some hepatic congestion             Intake/Output Summary (Last 24 hours) at 4/8/2022 1150  Last data filed at 4/8/2022 1145  Gross per 24 hour   Intake 4481.68 ml   Output 2327 ml   Net 2154.68 ml      Visit Vitals  BP (!) 89/61   Pulse 77   Temp 98.3 °F (36.8 °C)   Resp 20   Ht 6' 1\" (1.854 m)   Wt 195 lb (88.5 kg)   SpO2 97%   BMI 25.73 kg/m²      CXR Results  (Last 48 hours)               04/08/22 0424  XR CHEST PORT Final result    Impression:  1. No significant change in the appearance of the chest or support hardware.                   Narrative:  INDICATION: . HF   COMPARISON: Previous chest xray, yesterday. LIMITATIONS: Portable technique. Nay Sida FINDINGS: Single frontal view of the chest.    .   Lines/tubes/surgical: No significant change in the appearance of the right IJ   approach [place]-S[place]il catheter, mediastinal/chest drains, LVAD and cardiac assist   device. Heart/mediastinum: Unremarkable. Lungs/pleura: Hazy opacity over both bases. No visible pneumothorax. Additional Comments: Skinfold overlying the right chest.    . 04/07/22 0449  XR CHEST PORT Final result    Impression:  1. No significant change in the appearance of the chest or support hardware. Narrative:  INDICATION: . HF   COMPARISON: Previous chest xray, yesterday. LIMITATIONS: Portable technique. Nay Sida FINDINGS: Single frontal view of the chest.    .   Lines/tubes/surgical: No significant change in the appearance of the right IJ   approach [place]-Senthil catheter, LVAD, cardiac assist device and mediastinal/chest   drains. Heart/mediastinum: Unremarkable. Lungs/pleura: Bibasilar opacities. No visible pneumothorax. Additional Comments: None. Nay Sida LVAD   Pump Speed (RPM): 4800  Pump Flow (LPM): 3.5  PI (Pulsitility Index): 5.9  Power: 3.2   Test: Yes  Back Up  at Bedside & Labeled: Yes  Power Module Test: Yes  Driveline Site Care: No  Driveline Dressing: Clean, Dry, and Intact  Testing  Alarms Reviewed: Yes  Back up SC speed: 4800  Back up Low Speed Limit: 4400  Emergency Equipment with Patient?: Yes  Emergency procedures reviewed?: Yes  Drive line site inspected?: No  Drive line intergrity inspected?: Yes  Drive line dressing changed?: No      Total critical care time - 30 minutes (CPT 75544)      I personally spent the above critical care time. This is time spent at this critically ill patient's bedside / unit / floor actively involved in patient care as well as the coordination of care.   This does not include any procedural time which has been billed separately. This does not include any NP/PA patient care time. I had a face to face encounter with the patient, reviewed and interpreted patient data including clinical events, labs, images, vital signs, I/O's, and examined patient. I have discussed the case and the plan and management of the patient's care with the consulting services and bedside nurses. This patient has a high probability of imminent, clinically significant deterioration, which requires the highest level of preparedness to intervene urgently. I participated in the decision-making and personally managed or directed the management of life and organ supporting interventions to treat or prevent imminent deterioration. This patient has a critical illness or injury that is acutely impairing one or more vital organ systems such that there is a high probability of imminent or life threatening deterioration in the patient's condition. This patient requires high complexity medical decision making to assess, manipulate, and support vital organ system failure. After stabilization, this patient still requires management to prevent further life / organ threatening deterioration.

## 2022-04-08 NOTE — PROGRESS NOTES
Problem: Falls - Risk of  Goal: *Absence of Falls  Description: Document Eze Greene Fall Risk and appropriate interventions in the flowsheet. Outcome: Progressing Towards Goal  Note: Fall Risk Interventions:  Mobility Interventions: Communicate number of staff needed for ambulation/transfer         Medication Interventions: Evaluate medications/consider consulting pharmacy    Elimination Interventions: Call light in reach              Problem: Patient Education: Go to Patient Education Activity  Goal: Patient/Family Education  Outcome: Progressing Towards Goal     Problem: Diabetes Self-Management  Goal: *Disease process and treatment process  Description: Define diabetes and identify own type of diabetes; list 3 options for treating diabetes. Outcome: Progressing Towards Goal     Problem: Patient Education: Go to Patient Education Activity  Goal: Patient/Family Education  Outcome: Progressing Towards Goal     Problem: Pressure Injury - Risk of  Goal: *Prevention of pressure injury  Description: Document Jl Scale and appropriate interventions in the flowsheet.   Outcome: Progressing Towards Goal  Note: Pressure Injury Interventions:  Sensory Interventions: Assess changes in LOC,Keep linens dry and wrinkle-free         Activity Interventions: Increase time out of bed    Mobility Interventions: Pressure redistribution bed/mattress (bed type),HOB 30 degrees or less    Nutrition Interventions: Document food/fluid/supplement intake,Discuss nutritional consult with provider    Friction and Shear Interventions: Foam dressings/transparent film/skin sealants,HOB 30 degrees or less                Problem: Patient Education: Go to Patient Education Activity  Goal: Patient/Family Education  Outcome: Progressing Towards Goal

## 2022-04-08 NOTE — PROGRESS NOTES
0800- bedside report received. Patient in bed without complaints. 6522- blood transfusion started. 4950- Dr. Esme Wilson, at bedside. Update given on overnight and morning events. Orders received. 1305- 150 ml out of chest tube in 30 minutes; 110 ml out when standing with PT. [facility], NP notified. Orders to stop heparin drip and send H&H STAT.    1405- Fiser called for update. Informed him of chest tube output over last hour ( ) and most recent H&H. [facility] and possibly restart heparin at 1600.    1632- restarted heparin at 17 units. 1815- updated Dr. Esme Wilson on chest tube output. Patient dumped 270 ml after ambulating and another 130 ml 10 minutes later. Orders to hold coumadin and heparin until 2000 and to let the surgeon know. 1962- paged cardiac surgery. 1825- updated Kena Bui NP on situation. She will make Dr. Manning Faster aware. 2000- Bedside and Verbal shift change report given to [facility] PAO, RN (oncoming nurse) by Sydney Jasmine RN (offgoing nurse).  Report included the following information SBAR, Kardex, Recent Results and Cardiac Rhythm SR.

## 2022-04-09 ENCOUNTER — APPOINTMENT (OUTPATIENT)
Dept: GENERAL RADIOLOGY | Age: 77
DRG: 001 | End: 2022-04-09
Attending: NURSE PRACTITIONER
Payer: MEDICARE

## 2022-04-09 LAB
ABO + RH BLD: NORMAL
ADMINISTERED INITIALS, ADMINIT: NORMAL
ALBUMIN SERPL-MCNC: 3.1 G/DL (ref 3.5–5)
ALBUMIN/GLOB SERPL: 1.1 {RATIO} (ref 1.1–2.2)
ALP SERPL-CCNC: 90 U/L (ref 45–117)
ALT SERPL-CCNC: 43 U/L (ref 12–78)
ANION GAP SERPL CALC-SCNC: 6 MMOL/L (ref 5–15)
APTT PPP: 50.4 SEC (ref 22.1–31)
APTT PPP: 54.1 SEC (ref 22.1–31)
APTT PPP: 55.5 SEC (ref 22.1–31)
AST SERPL-CCNC: 160 U/L (ref 15–37)
BDY SITE: ABNORMAL
BILIRUB SERPL-MCNC: 2.3 MG/DL (ref 0.2–1)
BLD PROD TYP BPU: NORMAL
BLD PROD TYP BPU: NORMAL
BLOOD GROUP ANTIBODIES SERPL: NORMAL
BNP SERPL-MCNC: 6129 PG/ML
BPU ID: NORMAL
BPU ID: NORMAL
BUN SERPL-MCNC: 30 MG/DL (ref 6–20)
BUN/CREAT SERPL: 34 (ref 12–20)
CALCIUM SERPL-MCNC: 8.3 MG/DL (ref 8.5–10.1)
CARB RATIO, CHOR: 15
CARBOHYDRATE EATEN, CHO: 45 G
CHLORIDE SERPL-SCNC: 103 MMOL/L (ref 97–108)
CO2 SERPL-SCNC: 23 MMOL/L (ref 21–32)
COHGB MFR BLD: 1.3 % (ref 1–2)
COMMENT, HOLDF: NORMAL
CREAT SERPL-MCNC: 0.87 MG/DL (ref 0.7–1.3)
CROSSMATCH RESULT,%XM: NORMAL
CROSSMATCH RESULT,%XM: NORMAL
D50 ADMINISTERED, D50ADM: 0 ML
D50 ORDER, D50ORD: 0 ML
ERYTHROCYTE [DISTWIDTH] IN BLOOD BY AUTOMATED COUNT: 23.2 % (ref 11.5–14.5)
FOLATE SERPL-MCNC: 9.8 NG/ML (ref 5–21)
GLOBULIN SER CALC-MCNC: 2.9 G/DL (ref 2–4)
GLSCOM COMMENTS: NORMAL
GLUCOSE BLD STRIP.AUTO-MCNC: 103 MG/DL (ref 65–117)
GLUCOSE BLD STRIP.AUTO-MCNC: 105 MG/DL (ref 65–117)
GLUCOSE BLD STRIP.AUTO-MCNC: 106 MG/DL (ref 65–117)
GLUCOSE BLD STRIP.AUTO-MCNC: 112 MG/DL (ref 65–117)
GLUCOSE BLD STRIP.AUTO-MCNC: 115 MG/DL (ref 65–117)
GLUCOSE BLD STRIP.AUTO-MCNC: 119 MG/DL (ref 65–117)
GLUCOSE BLD STRIP.AUTO-MCNC: 134 MG/DL (ref 65–117)
GLUCOSE BLD STRIP.AUTO-MCNC: 158 MG/DL (ref 65–117)
GLUCOSE BLD STRIP.AUTO-MCNC: 171 MG/DL (ref 65–117)
GLUCOSE BLD STRIP.AUTO-MCNC: 82 MG/DL (ref 65–117)
GLUCOSE BLD STRIP.AUTO-MCNC: 85 MG/DL (ref 65–117)
GLUCOSE BLD STRIP.AUTO-MCNC: 89 MG/DL (ref 65–117)
GLUCOSE BLD STRIP.AUTO-MCNC: 95 MG/DL (ref 65–117)
GLUCOSE BLD STRIP.AUTO-MCNC: 97 MG/DL (ref 65–117)
GLUCOSE BLD STRIP.AUTO-MCNC: 98 MG/DL (ref 65–117)
GLUCOSE SERPL-MCNC: 107 MG/DL (ref 65–100)
GLUCOSE, GLC: 103 MG/DL
GLUCOSE, GLC: 105 MG/DL
GLUCOSE, GLC: 106 MG/DL
GLUCOSE, GLC: 112 MG/DL
GLUCOSE, GLC: 119 MG/DL
GLUCOSE, GLC: 134 MG/DL
GLUCOSE, GLC: 171 MG/DL
GLUCOSE, GLC: 82 MG/DL
GLUCOSE, GLC: 85 MG/DL
GLUCOSE, GLC: 89 MG/DL
GLUCOSE, GLC: 95 MG/DL
GLUCOSE, GLC: 97 MG/DL
GLUCOSE, GLC: 98 MG/DL
HCT VFR BLD AUTO: 24.9 % (ref 36.6–50.3)
HGB BLD OXIMETRY-MCNC: 8.2 G/DL (ref 14–17)
HGB BLD-MCNC: 8.3 G/DL (ref 12.1–17)
HIGH TARGET, HITG: 130 MG/DL
INR PPP: 1.2 (ref 0.9–1.1)
INSULIN ADMINSTERED, INSADM: 0 UNITS/HOUR
INSULIN ADMINSTERED, INSADM: 0.9 UNITS/HOUR
INSULIN ADMINSTERED, INSADM: 1 UNITS/HOUR
INSULIN ADMINSTERED, INSADM: 1.2 UNITS/HOUR
INSULIN ADMINSTERED, INSADM: 1.2 UNITS/HOUR
INSULIN ADMINSTERED, INSADM: 1.5 UNITS/HOUR
INSULIN ADMINSTERED, INSADM: 1.5 UNITS/HOUR
INSULIN ADMINSTERED, INSADM: 1.6 UNITS/HOUR
INSULIN ADMINSTERED, INSADM: 1.6 UNITS/HOUR
INSULIN ADMINSTERED, INSADM: 1.8 UNITS/HOUR
INSULIN ADMINSTERED, INSADM: 1.9 UNITS/HOUR
INSULIN ADMINSTERED, INSADM: 2.1 UNITS/HOUR
INSULIN ADMINSTERED, INSADM: 2.2 UNITS/HOUR
INSULIN ADMINSTERED, INSADM: 2.5 UNITS/HOUR
INSULIN ADMINSTERED, INSADM: 3.2 UNITS/HOUR
INSULIN BOLUS ADMINISTERED, INSBOLADM: 3 UNITS/HOUR
INSULIN BOLUS ORDERED, INSBOLORD: 3 UNITS/HOUR
INSULIN ORDER, INSORD: 0.8 UNITS/HOUR
INSULIN ORDER, INSORD: 0.9 UNITS/HOUR
INSULIN ORDER, INSORD: 1 UNITS/HOUR
INSULIN ORDER, INSORD: 1.2 UNITS/HOUR
INSULIN ORDER, INSORD: 1.2 UNITS/HOUR
INSULIN ORDER, INSORD: 1.5 UNITS/HOUR
INSULIN ORDER, INSORD: 1.5 UNITS/HOUR
INSULIN ORDER, INSORD: 1.6 UNITS/HOUR
INSULIN ORDER, INSORD: 1.6 UNITS/HOUR
INSULIN ORDER, INSORD: 1.8 UNITS/HOUR
INSULIN ORDER, INSORD: 1.9 UNITS/HOUR
INSULIN ORDER, INSORD: 2.1 UNITS/HOUR
INSULIN ORDER, INSORD: 2.2 UNITS/HOUR
INSULIN ORDER, INSORD: 2.5 UNITS/HOUR
INSULIN ORDER, INSORD: 3.2 UNITS/HOUR
LACTATE SERPL-SCNC: 1 MMOL/L (ref 0.4–2)
LDH SERPL L TO P-CCNC: 289 U/L (ref 85–241)
LOW TARGET, LOT: 95 MG/DL
MAGNESIUM SERPL-MCNC: 2.3 MG/DL (ref 1.6–2.4)
MCH RBC QN AUTO: 26.3 PG (ref 26–34)
MCHC RBC AUTO-ENTMCNC: 33.3 G/DL (ref 30–36.5)
MCV RBC AUTO: 78.8 FL (ref 80–99)
METHGB MFR BLD: 0.3 % (ref 0–1.4)
MINUTES UNTIL NEXT BG, NBG: 60 MIN
MULTIPLIER, MUL: 0.03
MULTIPLIER, MUL: 0.04
MULTIPLIER, MUL: 0.05
NRBC # BLD: 0.02 K/UL (ref 0–0.01)
NRBC BLD-RTO: 0.3 PER 100 WBC
ORDER INITIALS, ORDINIT: NORMAL
OXYHGB MFR BLD: 45.1 % (ref 94–97)
PLATELET # BLD AUTO: 104 K/UL (ref 150–400)
POTASSIUM SERPL-SCNC: 3.8 MMOL/L (ref 3.5–5.1)
PROT SERPL-MCNC: 6 G/DL (ref 6.4–8.2)
PROTHROMBIN TIME: 12 SEC (ref 9–11.1)
RBC # BLD AUTO: 3.16 M/UL (ref 4.1–5.7)
SAMPLES BEING HELD,HOLD: NORMAL
SAO2 % BLD: 46 % (ref 95–99)
SERVICE CMNT-IMP: ABNORMAL
SERVICE CMNT-IMP: NORMAL
SODIUM SERPL-SCNC: 132 MMOL/L (ref 136–145)
SPECIMEN EXP DATE BLD: NORMAL
SPECIMEN SITE: ABNORMAL
STATUS OF UNIT,%ST: NORMAL
STATUS OF UNIT,%ST: NORMAL
THERAPEUTIC RANGE,PTTT: ABNORMAL SECS (ref 58–77)
UNIT DIVISION, %UDIV: 0
UNIT DIVISION, %UDIV: 0
VIT B12 SERPL-MCNC: 755 PG/ML (ref 193–986)
WBC # BLD AUTO: 7.7 K/UL (ref 4.1–11.1)

## 2022-04-09 PROCEDURE — 82962 GLUCOSE BLOOD TEST: CPT

## 2022-04-09 PROCEDURE — 77030012390 HC DRN CHST BTL GTNG -B

## 2022-04-09 PROCEDURE — 71045 X-RAY EXAM CHEST 1 VIEW: CPT

## 2022-04-09 PROCEDURE — 74011250636 HC RX REV CODE- 250/636: Performed by: NURSE PRACTITIONER

## 2022-04-09 PROCEDURE — 74011250637 HC RX REV CODE- 250/637: Performed by: NURSE PRACTITIONER

## 2022-04-09 PROCEDURE — 82375 ASSAY CARBOXYHB QUANT: CPT

## 2022-04-09 PROCEDURE — 83735 ASSAY OF MAGNESIUM: CPT

## 2022-04-09 PROCEDURE — 74011000250 HC RX REV CODE- 250: Performed by: NURSE PRACTITIONER

## 2022-04-09 PROCEDURE — 80053 COMPREHEN METABOLIC PANEL: CPT

## 2022-04-09 PROCEDURE — 97530 THERAPEUTIC ACTIVITIES: CPT

## 2022-04-09 PROCEDURE — 85730 THROMBOPLASTIN TIME PARTIAL: CPT

## 2022-04-09 PROCEDURE — 74011000258 HC RX REV CODE- 258: Performed by: NURSE PRACTITIONER

## 2022-04-09 PROCEDURE — 85027 COMPLETE CBC AUTOMATED: CPT

## 2022-04-09 PROCEDURE — 94762 N-INVAS EAR/PLS OXIMTRY CONT: CPT

## 2022-04-09 PROCEDURE — 85610 PROTHROMBIN TIME: CPT

## 2022-04-09 PROCEDURE — 74011000258 HC RX REV CODE- 258: Performed by: THORACIC SURGERY (CARDIOTHORACIC VASCULAR SURGERY)

## 2022-04-09 PROCEDURE — 74011636637 HC RX REV CODE- 636/637: Performed by: NURSE PRACTITIONER

## 2022-04-09 PROCEDURE — 74011250637 HC RX REV CODE- 250/637: Performed by: INTERNAL MEDICINE

## 2022-04-09 PROCEDURE — 36415 COLL VENOUS BLD VENIPUNCTURE: CPT

## 2022-04-09 PROCEDURE — 99233 SBSQ HOSP IP/OBS HIGH 50: CPT | Performed by: INTERNAL MEDICINE

## 2022-04-09 PROCEDURE — 74011000250 HC RX REV CODE- 250: Performed by: THORACIC SURGERY (CARDIOTHORACIC VASCULAR SURGERY)

## 2022-04-09 PROCEDURE — 74011636637 HC RX REV CODE- 636/637: Performed by: INTERNAL MEDICINE

## 2022-04-09 PROCEDURE — 93750 INTERROGATION VAD IN PERSON: CPT | Performed by: INTERNAL MEDICINE

## 2022-04-09 PROCEDURE — 74011000258 HC RX REV CODE- 258: Performed by: INTERNAL MEDICINE

## 2022-04-09 PROCEDURE — 65610000003 HC RM ICU SURGICAL

## 2022-04-09 PROCEDURE — 83605 ASSAY OF LACTIC ACID: CPT

## 2022-04-09 PROCEDURE — 82607 VITAMIN B-12: CPT

## 2022-04-09 PROCEDURE — 82746 ASSAY OF FOLIC ACID SERUM: CPT

## 2022-04-09 PROCEDURE — 83880 ASSAY OF NATRIURETIC PEPTIDE: CPT

## 2022-04-09 PROCEDURE — 83615 LACTATE (LD) (LDH) ENZYME: CPT

## 2022-04-09 PROCEDURE — 77010033678 HC OXYGEN DAILY

## 2022-04-09 PROCEDURE — 74011250636 HC RX REV CODE- 250/636: Performed by: INTERNAL MEDICINE

## 2022-04-09 PROCEDURE — 93005 ELECTROCARDIOGRAM TRACING: CPT

## 2022-04-09 PROCEDURE — 74011000250 HC RX REV CODE- 250: Performed by: INTERNAL MEDICINE

## 2022-04-09 RX ORDER — WARFARIN 2.5 MG/1
2.5 TABLET ORAL ONCE
Status: COMPLETED | OUTPATIENT
Start: 2022-04-09 | End: 2022-04-09

## 2022-04-09 RX ORDER — FAMOTIDINE 20 MG/1
20 TABLET, FILM COATED ORAL EVERY 12 HOURS
Status: DISCONTINUED | OUTPATIENT
Start: 2022-04-09 | End: 2022-05-06 | Stop reason: HOSPADM

## 2022-04-09 RX ORDER — POTASSIUM CHLORIDE 29.8 MG/ML
20 INJECTION INTRAVENOUS ONCE
Status: ACTIVE | OUTPATIENT
Start: 2022-04-09 | End: 2022-04-09

## 2022-04-09 RX ORDER — INSULIN GLARGINE 100 [IU]/ML
11 INJECTION, SOLUTION SUBCUTANEOUS ONCE
Status: COMPLETED | OUTPATIENT
Start: 2022-04-09 | End: 2022-04-09

## 2022-04-09 RX ORDER — AMIODARONE HYDROCHLORIDE 200 MG/1
400 TABLET ORAL 2 TIMES DAILY
Status: DISCONTINUED | OUTPATIENT
Start: 2022-04-09 | End: 2022-04-12

## 2022-04-09 RX ORDER — POTASSIUM CHLORIDE 750 MG/1
20 TABLET, FILM COATED, EXTENDED RELEASE ORAL DAILY
Status: DISCONTINUED | OUTPATIENT
Start: 2022-04-09 | End: 2022-04-10

## 2022-04-09 RX ADMIN — ASPIRIN 81 MG CHEWABLE TABLET 81 MG: 81 TABLET CHEWABLE at 09:18

## 2022-04-09 RX ADMIN — FAMOTIDINE 20 MG: 10 INJECTION INTRAVENOUS at 09:18

## 2022-04-09 RX ADMIN — SUCRALFATE 1 G: 1 TABLET ORAL at 13:26

## 2022-04-09 RX ADMIN — SODIUM CHLORIDE, PRESERVATIVE FREE 10 ML: 5 INJECTION INTRAVENOUS at 23:18

## 2022-04-09 RX ADMIN — Medication 1.9 UNITS/HR: at 03:39

## 2022-04-09 RX ADMIN — AMIODARONE HYDROCHLORIDE 0.25 MG/MIN: 50 INJECTION, SOLUTION INTRAVENOUS at 03:36

## 2022-04-09 RX ADMIN — WARFARIN SODIUM 2.5 MG: 2.5 TABLET ORAL at 17:46

## 2022-04-09 RX ADMIN — SUCRALFATE 1 G: 1 TABLET ORAL at 07:36

## 2022-04-09 RX ADMIN — SODIUM CHLORIDE 10 ML/HR: 4.5 INJECTION, SOLUTION INTRAVENOUS at 03:24

## 2022-04-09 RX ADMIN — NICARDIPINE HYDROCHLORIDE 10 MG/HR: 25 INJECTION, SOLUTION INTRAVENOUS at 07:16

## 2022-04-09 RX ADMIN — HYDRALAZINE HYDROCHLORIDE 75 MG: 50 TABLET, FILM COATED ORAL at 17:46

## 2022-04-09 RX ADMIN — AMIODARONE HYDROCHLORIDE 400 MG: 200 TABLET ORAL at 17:46

## 2022-04-09 RX ADMIN — MIRTAZAPINE 7.5 MG: 15 TABLET, FILM COATED ORAL at 21:28

## 2022-04-09 RX ADMIN — SODIUM CHLORIDE, PRESERVATIVE FREE 10 ML: 5 INJECTION INTRAVENOUS at 05:10

## 2022-04-09 RX ADMIN — SODIUM CHLORIDE 9 ML/HR: 9 INJECTION, SOLUTION INTRAVENOUS at 03:22

## 2022-04-09 RX ADMIN — Medication 4 UNITS: at 13:59

## 2022-04-09 RX ADMIN — SODIUM CHLORIDE, PRESERVATIVE FREE 10 ML: 5 INJECTION INTRAVENOUS at 15:11

## 2022-04-09 RX ADMIN — GABAPENTIN 100 MG: 100 CAPSULE ORAL at 09:19

## 2022-04-09 RX ADMIN — Medication 3 UNITS: at 09:11

## 2022-04-09 RX ADMIN — FENTANYL CITRATE 25 MCG: 0.05 INJECTION, SOLUTION INTRAMUSCULAR; INTRAVENOUS at 22:53

## 2022-04-09 RX ADMIN — BUMETANIDE 1 MG: 0.25 INJECTION, SOLUTION INTRAMUSCULAR; INTRAVENOUS at 21:29

## 2022-04-09 RX ADMIN — GABAPENTIN 100 MG: 100 CAPSULE ORAL at 17:46

## 2022-04-09 RX ADMIN — SENNOSIDES AND DOCUSATE SODIUM 1 TABLET: 50; 8.6 TABLET ORAL at 09:18

## 2022-04-09 RX ADMIN — POLYETHYLENE GLYCOL 3350 17 G: 17 POWDER, FOR SOLUTION ORAL at 09:18

## 2022-04-09 RX ADMIN — MUPIROCIN: 20 OINTMENT TOPICAL at 09:29

## 2022-04-09 RX ADMIN — Medication 19 UNITS/KG/HR: at 18:42

## 2022-04-09 RX ADMIN — CHLORHEXIDINE GLUCONATE 10 ML: 1.2 RINSE ORAL at 17:47

## 2022-04-09 RX ADMIN — MUPIROCIN: 20 OINTMENT TOPICAL at 17:47

## 2022-04-09 RX ADMIN — CHLORHEXIDINE GLUCONATE 10 ML: 1.2 RINSE ORAL at 09:29

## 2022-04-09 RX ADMIN — INSULIN GLARGINE 11 UNITS: 100 INJECTION, SOLUTION SUBCUTANEOUS at 14:45

## 2022-04-09 RX ADMIN — HYDRALAZINE HYDROCHLORIDE 75 MG: 50 TABLET, FILM COATED ORAL at 09:17

## 2022-04-09 RX ADMIN — SUCRALFATE 1 G: 1 TABLET ORAL at 17:41

## 2022-04-09 RX ADMIN — SUCRALFATE 1 G: 1 TABLET ORAL at 21:27

## 2022-04-09 RX ADMIN — AMIODARONE HYDROCHLORIDE 400 MG: 200 TABLET ORAL at 09:18

## 2022-04-09 RX ADMIN — FAMOTIDINE 20 MG: 20 TABLET ORAL at 21:28

## 2022-04-09 RX ADMIN — FENTANYL CITRATE 25 MCG: 0.05 INJECTION, SOLUTION INTRAMUSCULAR; INTRAVENOUS at 17:18

## 2022-04-09 RX ADMIN — Medication 3.2 UNITS/HR: at 11:29

## 2022-04-09 RX ADMIN — Medication 17 UNITS/KG/HR: at 00:15

## 2022-04-09 RX ADMIN — POTASSIUM CHLORIDE 20 MEQ: 750 TABLET, EXTENDED RELEASE ORAL at 09:18

## 2022-04-09 RX ADMIN — HYDRALAZINE HYDROCHLORIDE 75 MG: 50 TABLET, FILM COATED ORAL at 13:26

## 2022-04-09 RX ADMIN — DOBUTAMINE HYDROCHLORIDE 6 MCG/KG/MIN: 400 INJECTION INTRAVENOUS at 03:25

## 2022-04-09 RX ADMIN — HYDRALAZINE HYDROCHLORIDE 75 MG: 50 TABLET, FILM COATED ORAL at 21:27

## 2022-04-09 RX ADMIN — KETOROLAC TROMETHAMINE 15 MG: 30 INJECTION, SOLUTION INTRAMUSCULAR; INTRAVENOUS at 08:19

## 2022-04-09 RX ADMIN — HYDRALAZINE HYDROCHLORIDE 5 MG: 20 INJECTION INTRAMUSCULAR; INTRAVENOUS at 20:04

## 2022-04-09 RX ADMIN — BUMETANIDE 1 MG: 0.25 INJECTION, SOLUTION INTRAMUSCULAR; INTRAVENOUS at 15:49

## 2022-04-09 NOTE — PROGRESS NOTES
[facility] in [place], [place]  Inpatient Progress Note      Patient name: Selina Truong  Patient : 1945  Patient MRN: [id number]  Consulting MD: Rk Torres MD  Date of service: 22    REASON FOR REFERRAL:  Management of LVAD     PLAN OF CARE:   · 70 y/o with severe ischemic cardiomyopathy, LVEF 15-20%; stage D, NYHA class IV symptoms; s/p LVAD implant with HM3 as DT due to age (22)  · Postoperative course:  · Bleeding intra-op, required cryo, k-centra, FFP, Plt, and cellsaver in OR  · RV dysfunction requiring prolonged inotropic support with dobutamine  · Routine postoperative care    RECOMMENDATIONS:   POD#4 from LVAD Implant  Continue speed 4800rpms, low speed 4400  Decrease dobutamine to 5 mcg/kg/min, keep this dose and no plans to wean   Increase hydralazine to 75mg PO QID; and wean cardene to off as tolerated  Increase amiodarone to 400mg PO twice daily; wean amiodarone gtt to off  Start potassium 20meq daily  Bumex 1mg IV as needed for CVP > 12 (goal 12-14) to prevent LV suckdown  Discontinue albumins  Cannot tolerate ARB/ARNi until Cr stable  Heparin gtt per protocol  Continue coumadin 2.5mg tonight, goal INR 2-3  Check NICOM if correlates with SGC first, then would like to remove SGC as appears contributing to severe TR  ASA 81mg daily  Start gabapentin 100mg BID  Start Toradol q 6 for 25 hours- watch renal function  Start Lidocaine patch  Ferritin > 700- no venofer- repeat next week   Driveline dressing changes daily for now until 5/3/22  Added Fentanyl patch for better pain control (pt with allergy to oxycodone)  Continue bowel regimen  Low dose Remeron  Pulmonary toilet, monitor CT output   SSI   CPAP QHS  Advanced care plan forms on file   Strict I/O, daily weights, Na+ restricted diet   Continue VAD education     IMPRESSION:  S/p LVAD implant as DT on 22 with Dr. Calles Bodily   chronic systolic heart failure  · Stage D, NYHA class IV symptoms improved to class II on inotropes  · Non-ischemic cardiomyopathy, LVEF 20% with LVEDD 6.2  · RV dysfunction, TAPSE 1.22 (prelimnary read)  · TBili 1.5 likely from cardiac congestion  At risk of sudden cardiac death  · Recent cardiac arrest   · S/p ICD (1/2013, [employer] followed by [facility]); New implant on 10/21/2021 [employer]  Coronary artery disease  · S/p multiple interventions  · S/p 4V CABG (8/2012)  · LHC (7/2019) severe stenosis of LIMA to LAD anastomosis site, SVG graft to OM is occluded, SVG graft to RCA 40-50%, LAD occluded proximally, severe proximal LCx, RCA is the long . · PET (6/2019) EF 24% with anterior lateral and inferior reversible defect  Cardiac risk factors  · HTN  · HL  · DM2  · SRUTHI on CPAP  · MIld carotid stenosis  Anemia, microcytic  · Iron deficiency  DVT with filter  Pulmonary nodules   Dysphagia     Interval Events:  No issues overnight  Pain well controlled  MAPs at goal  CVP around 13  Dobutamine 6  CBC stable   BMP stable  CXR clear    CARDIAC IMAGING:  Echo (4/8/22)    Left Ventricle: Left ventricle size is normal. Moderately increased wall thickness. Findings consistent with concentric remodeling. Severe global hypokinesis present. Severely reduced left ventricular systolic function with a visually estimated EF of 10 -15%. 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   Aortic Valve: Moderate sclerosis of the aortic valve cusp.   Mitral Valve: Moderately thickened leaflet. Mild transvalvular regurgitation.   Tricuspid Valve: Severe transvalvular regurgitation.   Right Atrium: Right atrium is severely dilated.     Echo (3/2/22)   · LV 10-15%  · Mod-severe MR     Echo (11/23/21):  · LV 15-20%. · Mod-severe, MR, mod-TR    Echo (5/20/21)  · LV: Estimated LVEF is 20 - 25%. Normal wall thickness. Mildly dilated left ventricle. Severely and globally reduced systolic function.  Severe (grade 4) left ventricular diastolic dysfunction. · LA: Severely dilated left atrium. · RA: Severely dilated right atrium. · MV: Moderate mitral valve regurgitation is present. · TV: Moderate tricuspid valve regurgitation is present. · AO: Mild aortic root dilatation. · RV: Pacer/ICD present. · LVED 6.2cm    EKG (5/20/21) SB with 1degree AV block, QRS 116ms     LHC (5/24/21) Native Coronaries: LM - moderate to severe distal disease 50%. % prox occluded (), heavily calcified, LCx: 80% proximal stenosis. OM1 is  (seen filling faintly via collaterals). OM2 99% stenosis. RCA: 100% proximal occluded.  LIMA to LAD: patent, supplies only a diagonal branch (probably D2). The LAD distal to the bifurcation is 100% occluded () and fills via right to left collaterals off the SVG to RCA. SVG to R-PDA: patent with moderate diffuse irregularities but no obstructive disease in the graft.    No appealing interventional targets.      NST as above     ICD Interrogation (11/12/2021) [employer] VVI, thoracic impedence trending up, no events, normal device function and good battery  ICD interrogation (5/12/21) [employer] single lead, no events, normal device function and good battery     HEMODYNAMICS:  RHC 5/24/21 CI 1.97, PA 33/9/17, RA 1, PCWP 6- off milrinone   CPEST not done     Patient underwent a 6 minute walk test 11/12/2021    6 Min Walk Report 11/12/2021 7/6/2021 5/20/2021   (PRE) HR 88 98 74   (PRE) O2 Sat 100 - 99   (POST)  - 81   (POST) O2 Sat 99 98% on Ra 99   Distance in Meters 386.58 - 1158.24         OTHER IMAGING:  CXR (6/17/21) clear  CT 5/21/21 1. Irregular pulmonary nodule in the left upper lobe measuring 2 cm, suspicious for primary pulmonary malignancy. 2. Additional 6 mm left upper lobe pulmonary nodule. 3. Severe calcific atherosclerosis of the coronary arteries and abdominal aorta. No aneurysm. 4. Cardiomegaly.  5. No acute abnormality within the chest, abdomen, or pelvis. HISTORY OF PRESENT ILLNESS:  Briefly, Tate Casas is a 69 y.o. male with h/o HTN, HL, DM2, SRUTHI on CPAP, chronic systolic heart failure, stage D, NYHA class IV symptoms, non-ischemic cardiomyopathy, LVEF 20% with LVEDD 6.2, RV dysfunciton, TAPSE 1.22, TBili 1.5 likely from cardiac congestion, recent cardiac arrest s/p ICD (1/2013, [employer] followed by [facility]), coronary artery disease s/p multiple interventions s/p 4V CABG (8/2012), LHC (7/2019) severe stenosis of LIMA to LAD anastomosis site, SVG graft to OM is occluded, SVG graft to RCA 40-50%, LAD occluded proximally, severe proximal LCx, RCA is the long . PET (6/2019) EF 24% with anterior lateral and inferior reversible defect. Anemia, microcytic with iron deficiency, DVT with filter.     Patient was referred to [facility] by Dr. Marycruz Carmona for evaluation of his candidacy for advanced therapies.     Patient agreed to inpatient initiation of palliative infusion of chronic inotropes and evaluation for LVAD-DT. Patient was admitted 5/2-5/25/21. Patient was started on milrinone infusion and had first part of LVAD evaluation completed. Right and left heart cath on 5/24/21 that showed severe diffuse native vessel CAD, with patent grafts (LIMA to D2, SVG to RCA).  Antiplatelet therapy was held during hospitalization and after discharge due to anticipated pulmonary nodule biopsy, bone marrow biopsy and EGD/C-Scope as part of LVAD workup.     Patient was readmitted 5/26-5/28/21 with hypertension, headache and chest pain, his troponin peaked at 10; he was shortly bridged with heparin, otherwise had normal EKG and chest CT negative for PE. Cardiology and AHF service was consulted and patient was discharged home after troponin came down.     Patient has now completed radiation treatment for adenocarcinoma of the lung. Hem-onc has cleared patient for VAD implant with 90% 2 year prognosis.      He was most recently admitted for elective pre-op EGD and colonoscopy which he tolerated well; path negative for malignancy. He presents today for elective admission to [facility] for hemodynamic optimization prior to LVAD implant. LVAD INTERROGATION:  Device interrogated in person  Device function normal, normal flow, no events  LVAD   Pump Speed (RPM): 4800  Pump Flow (LPM): 3.4  PI (Pulsitility Index): 6.9  Power: 3.1   Test: Yes  Back Up  at Bedside & Labeled: Yes  Power Module Test: No  Driveline Site Care: No  Driveline Dressing: Clean, Dry, and Intact  Testing  Alarms Reviewed: Yes  Back up SC speed: 4800  Back up Low Speed Limit: 4400  Emergency Equipment with Patient?: Yes  Emergency procedures reviewed?: Yes  Drive line site inspected?: Yes  Drive line intergrity inspected?: Yes  Drive line dressing changed?: No    PHYSICAL EXAM:  Visit Vitals  BP (!) 89/61   Pulse 77   Temp 98.6 °F (37 °C)   Resp 18   Ht 6' 1\" (1.854 m)   Wt 192 lb 7.4 oz (87.3 kg)   SpO2 100%   BMI 25.39 kg/m²     General: Patient is well developed, well-nourished in no acute distress  HEENT: Normocephalic and atraumatic. No scleral icterus. Pupils are equal, round and reactive to light and accomodation. No conjunctival injection. Oropharynx is clear. Neck: Supple. No evidence of thyroid enlargements or lymphadenopathy. JVD: Cannot be appreciated   Lungs: Breath sounds are equal and clear bilaterally. No wheezes, rhonchi, or rales. Heart: VAD hum, sternal wound dressing dry and clean  Abdomen: Soft, no mass or tenderness. No organomegaly or hernia. Bowel sounds present. Genitourinary and rectal: deferred  Extremities: No cyanosis, clubbing, or edema. Neurologic: No focal sensory or motor deficits are noted. Grossly intact. Psychiatric: Awake, alert an doriented x 3. Appropriate mood and affect. Skin: Warm, dry and well perfused. No lesions, nodules or rashes are noted. REVIEW OF SYSTEMS:  General: Denies fever, night sweats.   Ear, nose and throat: Denies difficulty hearing, sinus problems, runny nose, post-nasal drip, ringing in ears, mouth sores, loose teeth, ear pain, nosebleeds, sore throate, facial pain or numbess  Cardiovascular: see above in the interval history  Respiratory: Denies cough, wheezing, sputum production, hemoptysis. Gastrointestinal: Denies heartburn, constipation, intolerance to certain foods, diarrhea, abdominal pain, nausea, vomiting, difficulty swallowing, blood in stool  Kidney and bladder: Denies painful urination, frequent urination, urgency, prostate problems and impotence  Musculoskeletal: Denies joint pain, muscle weakness  Skin and hair: Denies change in existing skin lesions, hair loss or increase, breast changes    PAST MEDICAL HISTORY:  Past Medical History:   Diagnosis Date    CAD (coronary artery disease) '90 '97, '13 x 2    [place] [facility] [place]    Calculus of kidney     Colonic polyps     Congestive heart failure, unspecified     Diabetes ([facility] [address]     Gastritis     Hypercholesterolemia     Sleep apnea        PAST SURGICAL HISTORY:  Past Surgical History:   Procedure Laterality Date    COLONOSCOPY  04/06/2011    16, due 21    COLONOSCOPY N/A 3/2/2022    COLONOSCOPY    :- performed [facility] Dyllan [facility]ge[facility] at [facility] ENDOSCOPY    ENDOSCOPY, COLON, DIAGNOSTIC      11, due 16    HX CORONARY ARTERY BYPASS GRAFT  8/22/12    x 4 vessel by S.  James    HX HEENT      LASIK    HX PACEMAKER PLACEMENT  1/30/13    IA CARDIAC SURG PROCEDURE UNLIST  2012    x 4 vessels       FAMILY HISTORY:  Family History   Problem Relation Age of Onset    Heart Disease Mother         MI    Heart Disease Father         CAD & PVD    Lung Disease Father     Cancer Father         lung    Diabetes Maternal Grandmother     Heart Disease Other         CAD    Stroke Sister        SOCIAL HISTORY:  Social History     Socioeconomic History    Marital status:    Tobacco Use    Smoking status: Never Smoker    Smokeless tobacco: Never Used   Vaping Use    Vaping Use: Never used   Substance and Sexual Activity    Alcohol use: Yes     Alcohol/week: 0.0 standard drinks     Comment:  VERY RARE    Drug use: No       LABORATORY RESULTS:     Labs Latest Ref Rng & Units 4/9/2022 4/8/2022 4/8/2022 4/7/2022 4/7/2022 4/6/2022 4/5/2022   WBC 4.1 - 11.1 K/uL 7.7 - 8.6 - 8.7 5.9 6.5   RBC 4.10 - 5.70 M/uL 3.16(L) - 2.91(L) - 2.77(L) 3.22(L) 3.26(L)   Hemoglobin 12.1 - 17.0 g/dL 8. 3(L) 8.5(L) 7. 5(L) 7. 7(L) 7. 0(L) 8.0(L) 8. 1(L)   Hematocrit 36.6 - 50.3 % 24. 9(L) 25. 7(L) 23. 0(L) 23. 5(L) 22. 1(L) 25. 0(L) 25. 3(L)   MCV 80.0 - 99.0 FL 78. 8(L) - 79. 0(L) - 79. 8(L) 77. 6(L) 77. 6(L)   Platelets 911 - 681 K/uL 104(L) - 99(L) - 103(L) 149(L) 129(L)   Lymphocytes 12 - 49 % - - - - - - 2(L)   Monocytes 5 - 13 % - - - - - - 2(L)   Eosinophils 0 - 7 % - - - - - - 0   Basophils 0 - 1 % - - - - - - 0   Bands 0 - 6 % - - - - - - 3   Albumin 3.5 - 5.0 g/dL 3. 1(L) - 3. 0(L) 3.4(L) 3.5 3. 3(L) 3.4(L)   Calcium 8.5 - 10.1 MG/DL 8. 3(L) - 8. 3(L) 8. 3(L) 8.5 8.5 8.3(L)   Glucose 65 - 100 mg/dL 107(H) - 91 206(H) 145(H) 115(H) 118(H)   BUN 6 - 20 MG/DL 30(H) - 25(H) 22(H) 20 14 14   Creatinine 0.70 - 1.30 MG/DL 0.87 - 0.91 1.13 1.11 0.94 0.97   Sodium 136 - 145 mmol/L 132(L) - 133(L) 134(L) 134(L) 137 138   Potassium 3.5 - 5.1 mmol/L 3.8 - 4.0 3.8 4.3 4.1 4.1   TSH 0.36 - 3.74 uIU/mL - - - - - - -   LDH 85 - 241 U/L 289(H) - 306(H) - 329(H) 308(H) -   Some recent data might be hidden     Lab Results   Component Value Date/Time    TSH 1.68 03/01/2022 11:50 AM    TSH 1.47 05/26/2021 10:44 PM    TSH 1.97 05/20/2021 04:28 PM    TSH 1.41 02/09/2021 03:05 PM    TSH 1.740 08/14/2018 09:58 AM    TSH 1.710 10/18/2017 12:00 AM       ALLERGY:  Allergies   Allergen Reactions    Oxycodone Anaphylaxis    Pcn [Penicillins] Hives        CURRENT MEDICATIONS:    Current Facility-Administered Medications:     potassium chloride 20 mEq in 50 ml IVPB, 20 mEq, IntraVENous, ONCE, Silvina Najera MD  [facility] amiodarone (CORDARONE) tablet 400 mg, 400 mg, Oral, BID, Ben Velazquez MD, 400 mg at 04/09/22 7835    hydrALAZINE (APRESOLINE) tablet 75 mg, 75 mg, Oral, QID, Ben Velazquez MD, 75 mg at 04/09/22 8466    potassium chloride SR (KLOR-CON 10) tablet 20 mEq, 20 mEq, Oral, DAILY, Ben Velazquez MD, 20 mEq at 04/09/22 0918    famotidine (PEPCID) tablet 20 mg, 20 mg, Oral, Q12H, Ben Velazquez MD    0.9% sodium chloride infusion 250 mL, 250 mL, IntraVENous, PRN, Zeus Stewartin B, NP    albumin human 5% (BUMINATE) solution 12.5 g, 12.5 g, IntraVENous, BID, Ben Velazquez MD, Held at 04/09/22 0900    bumetanide (BUMEX) injection 1 mg, 1 mg, IntraVENous, Q6H PRN, Ben Velazquez MD    mirtazapine (REMERON) tablet 7.5 mg, 7.5 mg, Oral, QHS, Terry, Danica B, NP, 7.5 mg at 04/08/22 2108    gabapentin (NEURONTIN) capsule 100 mg, 100 mg, Oral, BID, Terry, Danica B, NP, 100 mg at 04/09/22 0919    ketorolac (TORADOL) injection 15 mg, 15 mg, IntraVENous, Q6H PRN, Terry, Danica B, NP, 15 mg at 04/09/22 0819    lidocaine 4 % patch 1 Patch, 1 Patch, TransDERmal, Q24H, Terry, Danica B, NP, 1 Patch at 04/08/22 1512    Warfarin NP/MD dosing, , Other, PRN, Terry, Danica B, NP    melatonin tablet 3 mg, 3 mg, Oral, QHS PRN, Pascual Hunter MD, 3 mg at 04/07/22 2136    0.9% sodium chloride infusion 250 mL, 250 mL, IntraVENous, PRN, Ben Velazquez MD    0.9% sodium chloride infusion 250 mL, 250 mL, IntraVENous, PRN, Ben Velazquez MD    hydrALAZINE (APRESOLINE) 20 mg/mL injection 10 mg, 10 mg, IntraVENous, Q6H PRN, Betty JACOBSON, NP    aspirin chewable tablet 81 mg, 81 mg, Oral, DAILY, Betty Franco B, NP, 81 mg at 04/09/22 0918    hydrALAZINE (APRESOLINE) 20 mg/mL injection 5 mg, 5 mg, IntraVENous, Q6H PRN, Betty Franco B, NP, 5 mg at 04/08/22 1149    fentaNYL (DURAGESIC) 12 mcg/hr patch 1 Patch, 1 Patch, TransDERmal, Q72H, Betty JACOBSON, NP, 1 Patch at 04/07/22 2233    sodium chloride (NS) flush 5-40 mL, 5-40 mL, IntraVENous, Q8H, Myron Wonga WINSTON, NP, 10 mL at 04/09/22 0510    sodium chloride (NS) flush 5-40 mL, 5-40 mL, IntraVENous, PRN, Marvin, Venecia MONTERO, NP    0.9% sodium chloride infusion, 9 mL/hr, IntraVENous, CONTINUOUS, Marvin, Venecia MONTERO, NP, Last Rate: 9 mL/hr at 04/09/22 0322, 9 mL/hr at 04/09/22 0322    acetaminophen (TYLENOL) tablet 650 mg, 650 mg, Oral, Q6H PRN, Millie Wong, NP, 650 mg at 04/06/22 0205    naloxone (NARCAN) injection 0.4 mg, 0.4 mg, IntraVENous, PRN, PolliarMirian reynoso, NP    mupirocin (BACTROBAN) 2 % ointment, , Both Nostrils, BID, HenrydMirian, NP, Given at 04/09/22 0929    ondansetron (ZOFRAN) injection 4 mg, 4 mg, IntraVENous, Q4H PRN, Venecia Wong, NP, 4 mg at 04/08/22 0946    albuterol-ipratropium (DUO-NEB) 2.5 MG-0.5 MG/3 ML, 3 mL, Nebulization, Q6H PRN, Mirian Wong, NP    midazolam (VERSED) injection 1 mg, 1 mg, IntraVENous, Q1H PRN, PolliarJd reynosoise Bucy, NP    chlorhexidine (PERIDEX) 0.12 % mouthwash 10 mL, 10 mL, Oral, BID, Venecia oWng, NP, 10 mL at 04/09/22 0929    senna-docusate (PERICOLACE) 8.6-50 mg per tablet 1 Tablet, 1 Tablet, Oral, DAILY, Mirian Wong, NP, 1 Tablet at 04/09/22 2172    polyethylene glycol (MIRALAX) packet 17 g, 17 g, Oral, DAILY, Venecia Wong, NP, 17 g at 04/09/22 0918    ELECTROLYTE REPLACEMENT NOTE: Nurse to review Serum Potassium and Magnesuim levels and Initiate Electrolyte Replacement Protocol as needed, 1 Each, Other, PRN, Mirian Wong NP    insulin regular (NOVOLIN R, HUMULIN R) 100 Units in 0.9% sodium chloride 100 mL infusion, 0-50 Units/hr, IntraVENous, TITRATE, Venecia Wong NP, Last Rate: 3.2 mL/hr at 04/09/22 1129, 3.2 Units/hr at 04/09/22 1129    glucose chewable tablet 16 g, 4 Tablet, Oral, PRN, Mirian Wong NP    glucagon (GLUCAGEN) injection 1 mg, 1 mg, IntraMUSCular, PRN, Mirian Wong, NP    insulin lispro (HUMALOG) injection, , SubCUTAneous, TIDAC, Raquel Wong NP, 3 Units at 04/09/22 0911    insulin lispro (HUMALOG) injection, , SubCUTAneous, AC&HS, Poonam Wong, NP    dextrose 10 % infusion 0-250 mL, 0-250 mL, IntraVENous, PRN, Raquel Wong NP, Last Rate: 999 mL/hr at 04/07/22 2145, 45 mL at 04/07/22 2145    sucralfate (CARAFATE) tablet 1 g, 1 g, Oral, AC&HS, Marvin, Poonam MONTERO, NP, 1 g at 04/09/22 0736    0.45% sodium chloride infusion, 10 mL/hr, IntraVENous, CONTINUOUS, Jerica Najera MD, Last Rate: 10 mL/hr at 04/09/22 0802, 10 mL/hr at 04/09/22 0802    niCARdipine (CARDENE) 50 mg in 0.9% sodium chloride 100 mL infusion, 0-15 mg/hr, IntraVENous, TITRATE, Jerica Najera MD, Stopped at 04/09/22 1135    heparin 25,000 units in  ml infusion, 400 Units/hr, IntraVENous, CONTINUOUS, Poonam Wong NP, Last Rate: 4 mL/hr at 04/06/22 0013, 400 Units/hr at 04/06/22 0013    heparin 25,000 units in  ml infusion, 12-25 Units/kg/hr, IntraVENous, TITRATE, Poonam Wong NP, Last Rate: 14.1 mL/hr at 04/09/22 0801, 18 Units/kg/hr at 04/09/22 0801    propofol (DIPRIVAN) 10 mg/mL infusion, 0-50 mcg/kg/min, IntraVENous, TITRATE, Raquel Wong NP, Stopped at 04/06/22 1228    fentaNYL citrate (PF) injection 25 mcg, 25 mcg, IntraVENous, Q2H PRN, Millie Wong NP, 25 mcg at 04/08/22 0847    DOBUTamine (DOBUTREX) 1,000 mg/250 mL (4,000 mcg/mL) infusion, 0-10 mcg/kg/min, IntraVENous, TITRATE, Poonam Wong NP, Last Rate: 5.7 mL/hr at 04/09/22 1137, 5 mcg/kg/min at 04/09/22 1137    amiodarone (CORDARONE) 900 mg/250 ml D5W infusion, 0.25 mg/min, IntraVENous, TITRATE, Kris Mackenzie MD, Stopped at 04/09/22 1000    dexmedeTOMidine in 0.9 % NaCl (PRECEDEX) 400 mcg/100 mL (4 mcg/mL) infusion soln, 0.1-1.5 mcg/kg/hr, IntraVENous, TITRATE, Poonam Wong NP, Stopped at 04/06/22 1351    EPINEPHrine (ADRENALIN) 10 mg in 0.9% sodium chloride 250 mL infusion, 0-10 mcg/min, IntraVENous, TITRATE, Mima Wong NP, Stopped at 04/08/22 1758    NOREPINephrine (LEVOPHED) 32,000 mcg in dextrose 5% 250 mL (128 mcg/mL) infusion, 0.5-16 mcg/min, IntraVENous, TITRATE, Mima Wong NP, Stopped at 04/05/22 1426    PHENYLephrine (MAINOR-SYNEPHRINE) 100 mg in 0.9% sodium chloride 250 mL infusion,  mcg/min, IntraVENous, TITRATE, Mima Wong NP    vasopressin (VASOSTRICT) 20 Units in 0.9% sodium chloride 100 mL infusion, 0-0.04 Units/min, IntraVENous, TITRATE, Mima Wong NP, Stopped at 04/05/22 1353    PATIENT CARE TEAM:  Patient Care Team:  Clyde Ireland MD as PCP - General (Internal Medicine)  Clyde Irelnad MD as PCP - [address] [place] [address]  Alessio Corona MD as Physician (Cardiology)  Daniel Moon MD as Physician (Gastroenterology)  Sheela Gonzalez MD as Physician (Orthopedic Surgery)  Grisel Bustos MD as Physician (Ophthalmology)  Mellissa Morris MD (Cardiology)  Rabia Stuart MD (Cardiology)     Thank you for allowing me to participate in this patient's care.     Panchito Renner MD PhD  [address], [address]  Phone: [phone]  Fax: [phone]    Critically ill  Critical care 40 min

## 2022-04-09 NOTE — ROUTINE PROCESS
1000: amiodarone off    1100: cardene to 2.5 mg    1135: decrease Dobutamine to 5 mcg, cardene off    1350: spoke with Dr Valentin Cosby about Nicom numbers and overall patient hemodynamics. Orders to remove PA catheter and go back to 6 mcg of Dobutamine    1420: PTT 54.1 , increased Heparin to 19 units    1445: lantus 11 units given. PA catheter out    1545: spoke to Dr Valentin Cosby about low urine output and CVP 14. Going to give PRN bumex 1 mg    2000: PTT sent    2100: heparin to 20 units, PTT 55.5    2130: CVP 17-18, 1 mg bumex given    2330: Bedside shift change report given to [address] (oncoming nurse) by Shivam Barker RN (offgoing nurse). Report included the following information SBAR, Kardex, Accordion and Recent Results.

## 2022-04-09 NOTE — PROGRESS NOTES
2000 - Bedside and Verbal shift change report given to Alejandra Desai RN (oncoming nurse) by Benoit Verduzco RN (offgoing nurse). Report included the following information SBAR, Kardex, OR Summary, Intake/Output, MAR, Recent Results and Cardiac Rhythm NSR.     2200 - Updated Dr. Darliss Kocher of appropriate CT output; output of 40mL for past hour. Plan is to restart heparin gtt and coumadin. 0800 - Bedside and Verbal shift change report given to KIRK Curiel (oncoming nurse) by Alejandra Desai RN (offgoing nurse). Report included the following information SBAR, Kardex, OR Summary, Intake/Output, MAR, Recent Results and Cardiac Rhythm NSR.

## 2022-04-09 NOTE — PROGRESS NOTES
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 Intra-op course course complicated by bleeding requiring multiple rounds of pRBC's, platelets, FFP, cellsaver, and cryoprecipitate. Intra-op CHEL significant for moderate to severe RV dysfunction. ICU issues include:    1) Cardiogenic shock    General/neuro: The patient is alert and oriented. No acute issues. Respiratory: Encourage mobility, incentive spirometry, respiratory hygiene measures. Cardiac: Status post HeartMate 3 LVAD and aortic valve closure, day 4. Weaned off epinephrine and slow dobutamine wean in progress. P.o. antihypertensives for afterload reduction. LVAD flows at 4600 RPMs, no device related issues. Further management as per heart failure service. GI: Advance diet as tolerated. ID: White count stable at 7.7. No need for antibiotic therapy. Renal: Creatinine at 0.87 from 0.91. Bumex as needed. Prophylaxis: Weight-based heparin for therapeutic anticoagulation. Subjective:   Progress Note: 2022      Reason for ICU Admission: Post LVAD    HPI: As Above    Overnight Events:   2022      POD:  Day of Surgery    S/P:   Procedure(s):  REDO STERNOTOMY; RIGHT GROIN CUTDOWN FOR CANNULATION;  INSERTION OF LEFT VENTRICULAR ASSIST DEVICE (HMIII); AORTIC VALVE CLOSURE CHEL BY DR. José Antonio Larson.     Active Problem List:     Problem List  Date Reviewed: 3/17/2022          Codes Class    S/P ventricular assist device [facility]) ICD-10-CM: F50.240  ICD-9-CM: V45.89     Overview Signed 4/5/2022  2:22 PM by YOLI Marroquin     REDO STERNOTOMY   RIGHT GROIN CUTDOWN FOR CANNULATION with access of CVF and CFA  INSERTION OF LEFT VENTRICULAR ASSIST DEVICE (HMIII)  AORTIC VALVE CLOSURE with Park's stitch             HFrEF (heart failure with reduced ejection fraction) ([facility]) ICD-10-CM: I50.20  ICD-9-CM: 428.20         NSTEMI (non-ST elevated myocardial infarction) ([facility]) ICD-10-CM: I21.4  ICD-9-CM: 410.70         Acute on chronic clinical systolic heart failure ([facility]) ICD-10-CM: I50.23  ICD-9-CM: 428.23         Active advance directive on file ICD-10-CM: Z78.9  ICD-9-CM: V49.89         Type 2 diabetes, controlled, with peripheral circulatory disorder ([facility]) ICD-10-CM: E11.51  ICD-9-CM: 250.70, 443.81         CHF, stage C ([facility] ICD-10-CM: I50.9  ICD-9-CM: 428.0         Mixed hyperlipidemia ICD-10-CM: E78.2  ICD-9-CM: 272.2         Proteinuria ICD-10-CM: R80.9  ICD-9-CM: 791.0         BPH without obstruction/lower urinary tract symptoms ICD-10-CM: N40.0  ICD-9-CM: 600.00         Hematuria, gross ICD-10-CM: R31.0  ICD-9-CM: 599.71         Cardiac arrest ([facility] ICD-10-CM: I46.9  ICD-9-CM: 427.5         Insomnia, unspecified ICD-10-CM: G47.00  ICD-9-CM: 780.52         Encounter for long-term (current) use of other medications ICD-10-CM: Z79.899  ICD-9-CM: V58.69         Calculus of kidney ICD-10-CM: N20.0  ICD-9-CM: 592.0         Coronary atherosclerosis of native coronary artery ICD-10-CM: I25.10  ICD-9-CM: 414.01         Benign neoplasm of colon ICD-10-CM: D12.6  ICD-9-CM: 211.3         Unspecified sleep apnea ICD-10-CM: G47.30  ICD-9-CM: 780.57         Reflux esophagitis ICD-10-CM: K21.00  ICD-9-CM: 530.11               Past Medical History:      has a past medical history of CAD (coronary artery disease) ('90 '97, '13 x 2), Calculus of kidney, Colonic polyps, Congestive heart failure, unspecified, Diabetes ([facility] Gastritis, Hypercholesterolemia, and Sleep apnea.     Past Surgical History:      has a past surgical history that includes hx heent; colonoscopy (04/06/2011); endoscopy, colon, diagnostic; hx coronary artery bypass graft (8/22/12); hx pacemaker placement (1/30/13); pr cardiac surg procedure unlist (2012); and colonoscopy (N/A, 3/2/2022). Home Medications:     Prior to Admission medications    Medication Sig Start Date End Date Taking? Authorizing Provider   potassium chloride SR (K-TAB) 20 mEq tablet Take 2 Tablets by mouth two (2) times a day. 3/9/22  Yes Ella Wong NP   milrinone (PRIMACOR) 20 mg/100 mL (200 mcg/mL) infusion 28.5 mcg/min by IntraVENous route continuous. 3/3/22  Yes Ella Wong NP   tamsulosin (FLOMAX) 0.4 mg capsule TAKE 1 CAPSULE DAILY 3/2/22  Yes Aaron Gustafson MD   bumetanide (BUMEX) 2 mg tablet Take 1 Tablet by mouth two (2) times a day. Patient taking differently: Take 2 mg by mouth two (2) times a day. 2 mg in am 1mg in evening 2/11/22  Yes Mindi Garrison NP   prasugreL (Effient) 10 mg tablet Take 10 mg by mouth daily. Yes Provider, Historical   metFORMIN (GLUCOPHAGE) 500 mg tablet Take 1 Tablet by mouth two (2) times daily (with meals). 1/11/22  Yes Aaron Gustafson MD   ranolazine ER (Ranexa) 500 mg SR tablet Take 1 Tablet by mouth two (2) times a day. 1/11/22  Yes Aaron Gustafson MD   eplerenone (INSPRA) 50 mg tab tablet Take 1 Tablet by mouth daily. 1/11/22  Yes Aaron Gustafson MD   isosorbide mononitrate ER (IMDUR) 30 mg tablet Take 1 Tablet by mouth every twelve (12) hours. 1/4/22  Yes Lc Garner NP   hydrALAZINE (APRESOLINE) 50 mg tablet TAKE 1 TABLET BY MOUTH THREE TIMES DAILY 12/17/21  Yes TerryMathieu reyes NP   dapagliflozin Graeme Blanco) 10 mg tab tablet Take 1 Tablet by mouth daily.  11/12/21  Yes Yoly Allan NP   omeprazole (PRILOSEC) 20 mg capsule TAKE 1 CAPSULE BY MOUTH DAILY FOR INDIGESTION 7/6/21  Yes Keri PAREDES NP   rosuvastatin (CRESTOR) 10 mg tablet TAKE 1 TABLET NIGHTLY 4/26/21  Yes Pankaj Lee MD   aspirin delayed-release 81 mg tablet Take 81 mg by mouth daily. Yes Provider, Historical   benzonatate (TESSALON) 200 mg capsule Take 200 mg by mouth three (3) times daily as needed for Cough. Patient not taking: Reported on 3/7/2022    Provider, Historical   diphenhydrAMINE (Benadryl Allergy) 25 mg tablet Take 25 mg by mouth every six (6) hours as needed. Needed every night d/t picc line causing itching  Patient not taking: Reported on 4/3/2022    Provider, Historical   glucose blood VI test strips (OneTouch Ultra Test) strip USE TO TEST BLOOD SUGAR DAILY 7/20/21   Shelby Gustafson MD   OneTouch Delica Plus Lancet 33 gauge misc USE TO TEST BLOOD SUGAR DAILY 7/20/21   Shelby Gustafson MD   lancets misc Use to test blood sugar daily 6/16/20   Shelby Gustafson MD   acetaminophen (TYLENOL EXTRA STRENGTH) 500 mg tablet Take  by mouth every six (6) hours as needed. Patient not taking: Reported on 3/17/2022    Provider, Historical       Allergies/Social/Family History: Allergies   Allergen Reactions    Oxycodone Anaphylaxis    Pcn [Penicillins] Hives      Social History     Tobacco Use    Smoking status: Never Smoker    Smokeless tobacco: Never Used   Substance Use Topics    Alcohol use: Yes     Alcohol/week: 0.0 standard drinks     Comment:  VERY RARE      Family History   Problem Relation Age of Onset    Heart Disease Mother         MI    Heart Disease Father         CAD & PVD    Lung Disease Father     Cancer Father         lung    Diabetes Maternal Grandmother     Heart Disease Other         CAD    Stroke Sister        Review of Systems:     A comprehensive review of systems was negative except for that written in the HPI.     Objective:   Vital Signs:  Visit Vitals  BP (!) 89/61   Pulse 72   Temp 98.2 °F (36.8 °C)   Resp 16   Ht 6' 1\" (1.854 m)   Wt 87.3 kg (192 lb 7.4 oz)   SpO2 100%   BMI 25.39 kg/m²    O2 Flow Rate (L/min): 4 l/min O2 Device: Nasal cannula Temp (24hrs), Av.8 °F (37.1 °C), Min:98.2 °F (36.8 °C), Max:99.4 °F (37.4 °C)    CVP (mmHg): 16 mmHg (22 1300)      Intake/Output:     Intake/Output Summary (Last 24 hours) at 2022 1315  Last data filed at 2022 1200  Gross per 24 hour   Intake 2197.16 ml   Output 3628 ml   Net -1430.84 ml       Physical Exam:    General appearance: no distress, appears stated age  Respiratory: Decreased breath sounds b/l  Cardiac: Normal Sinus rhythm   Abdomen: soft, hypoactive sounds  Extremities: warm, perfused. LABS AND  DATA: Personally reviewed  Recent Labs     22  0424 22  1312 22  0308 22  0308   WBC 7.7  --   --  8.6   HGB 8.3* 8.5*   < > 7.5*   HCT 24.9* 25.7*   < > 23.0*   *  --   --  99*    < > = values in this interval not displayed. Recent Labs     22  0424 22  0308   * 133*   K 3.8 4.0    104   CO2 23 23   BUN 30* 25*   CREA 0.87 0.91   * 91   CA 8.3* 8.3*   MG 2.3 2.2     Recent Labs     22  0424 22  0308   AP 90 82   TP 6.0* 6.0*   ALB 3.1* 3.0*   GLOB 2.9 3.0     Recent Labs     22  1208 22  0424 22  1053 22  0308   INR  --  1.2*  --  1.2*   PTP  --  12.0*  --  12.0*   APTT 54.1* 50.4*   < > 50.1*    < > = values in this interval not displayed. Recent Labs     22  1418   PHI 7.45   PCO2I 30.9*   PO2I 143*     No results for input(s): CPK, CKMB, TROIQ, BNPP in the last 72 hours.     Hemodynamics:   PAP: PAP Systolic: 35 (76/17/78 4544) CO: CO (l/min): 4.2 l/min (22 1300)   Wedge:   CI: CI (l/min/m2): 2.1 l/min/m2 (22 1300)   CVP:  CVP (mmHg): 16 mmHg (22 1300) SVR:       PVR:       Ventilator Settings:  Mode Rate Tidal Volume Pressure FiO2 PEEP   Spontaneous,Pressure support   520 ml  5 cm H2O 40 % 5 cm H20     Peak airway pressure: 18 cm H2O    Minute ventilation: 7.71 l/min        MEDS: Reviewed    Chest X-Ray:  CXR Results  (Last 48 hours) 04/09/22 0503  XR CHEST PORT Final result    Impression:  1. No significant change in the appearance of the chest or support hardware. Narrative:  INDICATION: . HF   COMPARISON: Previous chest xray, yesterday. LIMITATIONS: Portable technique. Prema Rued FINDINGS: Single frontal view of the chest.    .   Lines/tubes/surgical: No significant change in the appearance of the right IJ   approach [place] catheter, mediastinal/chest drains, LVAD and cardiac assist   device. Heart/mediastinum: Unremarkable. Lungs/pleura: Hazy opacity in the left midlung and left base with obscured left   hemidiaphragm. No visible pneumothorax. Additional Comments: None. Prema Rued 04/08/22 0424  XR CHEST PORT Final result    Impression:  1. No significant change in the appearance of the chest or support hardware. Narrative:  INDICATION: . HF   COMPARISON: Previous chest xray, yesterday. LIMITATIONS: Portable technique. Prema Rued FINDINGS: Single frontal view of the chest.    .   Lines/tubes/surgical: No significant change in the appearance of the right IJ   approach [place] catheter, mediastinal/chest drains, LVAD and cardiac assist   device. Heart/mediastinum: Unremarkable. Lungs/pleura: Hazy opacity over both bases. No visible pneumothorax. Additional Comments: [place] overlying the right chest.    . ECHO:        Multidisciplinary Rounds Completed: Yes    ABCDEF Bundle/Checklist Completed:  Yes    SPECIAL EQUIPMENT  PA Catheter    DISPOSITION  Stay in ICU    CRITICAL CARE CONSULTANT NOTE  I had a face to face encounter with the patient, reviewed and interpreted patient data including clinical events, labs, images, vital signs, I/O's, and examined patient.   I have discussed the case and the plan and management of the patient's care with the consulting services, the bedside nurses and the respiratory therapist.      NOTE OF PERSONAL INVOLVEMENT IN CARE   This patient has a high probability of imminent, clinically significant deterioration, which requires the highest level of preparedness to intervene urgently. I participated in the decision-making and personally managed or directed the management of the following life and organ supporting interventions that required my frequent assessment to treat or prevent imminent deterioration. I personally spent 50 minutes of critical care time. This is time spent at this critically ill patient's bedside actively involved in patient care as well as the coordination of care. This does not include any procedural time which has been billed separately.     Socorro Calloway DO  Staff Intensivist/Anesthesiologist  [facility]  4/9/2022

## 2022-04-09 NOTE — PROGRESS NOTES
Problem: Mobility Impaired (Adult and Pediatric)  Goal: *Acute Goals and Plan of Care (Insert Text)  Description: FUNCTIONAL STATUS PRIOR TO ADMISSION: Patient was independent and active without use of DME.    HOME SUPPORT PRIOR TO ADMISSION: The patient lived with his wife but did not require assist.    Physical Therapy Goals  Initiated 4/8/2022  1. Patient will move from supine to sit and sit to supine , scoot up and down, and roll side to side in bed with supervision/set-up within 7 days. 2.  Patient will perform sit to/from stand with supervision/set-up within 7 days. 3.  Patient will ambulate 50 feet with least restrictive assistive device and minimal assistance/contact guard assist within 7 days. 4.  Patient will ascend/descend 6 stairs with handrail(s) with minimal assistance/contact guard assist within 14 days. 5.  Patient will perform cardiac exercises per protocol with supervision/set-up within 7 days. 6.  Patient will verbally and functionally recall 3/3 sternal precautions within 7 days. 7.  Patient will perform power exchange for power module to/from battery with minimal assistance within 7 days. Outcome: Progressing Towards Goal     PHYSICAL THERAPY TREATMENT  Patient: Selina Truong (89 y.o. male)  Date: 4/9/2022  Diagnosis: HFrEF (heart failure with reduced ejection fraction) ([facility]) [I50.20] <principal problem not specified>  Procedure(s) (LRB):  REDO STERNOTOMY; RIGHT GROIN CUTDOWN FOR CANNULATION;  INSERTION OF LEFT VENTRICULAR ASSIST DEVICE (HMIII); AORTIC VALVE CLOSURE CHEL BY DR. James Armstrong. (N/A) 4 Days Post-Op  Precautions: Fall,Sternal (LVAD)  Chart, physical therapy assessment, plan of care and goals were reviewed. ASSESSMENT  Patient continues with skilled PT services and is progressing towards goals. Pt is POD 4 from insertion of LVAD. Pt generally mobilized at [address] A level during session.  Pt received sitting in bedside chair requesting transition back to bed and agreeable to work with PT. RN present in room and assisted throughout session with line and LVAD management. PT witnessed RN instruct pt to perform  test which pt completed successfully. Pt able successfully verbalize \"move in tube\" precaution and demonstrated throughout. Pt successfully went sit>stand and chair>bed under Min A. Pt then stood at edge of bed ~1 minute while line management was performed and completed standing marching. Pt then went stand>sit and sat EOB ~ 4 minutes while educated on cardiac exercises (see flow sheet below), before going sit>supine with Min A and demonstrate ability to bridge and roll L & R for eliu positioning before session completed with RN present in room. Current Level of Function Impacting Discharge (mobility/balance): Min A for mobilility    Other factors to consider for discharge: LVAD, PLOF         PLAN :  Patient continues to benefit from skilled intervention to address the above impairments. Continue treatment per established plan of care. to address goals. Recommendation for discharge: (in order for the patient to meet his/her long term goals)  Physical therapy at least 2 days/week in the home     This discharge recommendation:  Has been made in collaboration with the attending provider and/or case management    IF patient discharges home will need the following DME: to be determined (TBD)       SUBJECTIVE:   Patient stated that makes sense.     OBJECTIVE DATA SUMMARY:   Cardiac diagnosis intervention:  The patient stated 3/3 sternal precautions when prompted. Reviewed the \"3 Ps\" with patient.     Critical Behavior:  Neurologic State: Alert  Orientation Level: Oriented X4  Cognition: Follows commands,Appropriate for age attention/concentration,Appropriate safety awareness     Functional Mobility Training:  Bed Mobility:  Rolling: Minimum assistance  Supine to Sit: Other (comment) (not observed, session started up in chair)  Sit to Supine: Minimum assistance (LE advancement)  Scooting: Minimum assistance        Transfers:  Sit to Stand: Minimum assistance;Assist x1  Stand to Sit: Minimum assistance;Assist x1        Bed to Chair: Minimum assistance (chair to bed)                    Balance:  Sitting: Intact; Without support  Standing: Impaired; Without support  Standing - Static: Fair;Occasional  Standing - Dynamic : Fair;Constant support        Therapeutic Exercises:   Patient instructed on the benefits and demonstrated cardiac exercises. Instructed and indicated understanding on how to progress reps, sets against gravity, working up to 5 lbs, standing and so on based on surgeon clearance for more weight in prep for functional activity. Can use household items for weights.     CARDIAC   EXERCISE    Sets    Reps    Active  Active Assist    Passive  Self ROM    Comments    Shoulder rolls 2  5   [x]                            []                             []                             []                                Shoulder abduction  1  5  []                             []                             []                             []                                Scapular elevation  2  5  [x]                             []                              []                             []                                Scapular retraction  2  5  [x]                             []                             []                             []                                Trunk rotation  1  5  []                             []                             []                             []                                Trunk sidebending  1  5  []                             []                              []                             []                                        Pain Rating:  Pt did not verbalize pain during session    Activity Tolerance:   Good and tolerates ADLs without rest breaks      After treatment patient left in no apparent distress:   Supine in bed, Call bell within reach, Side rails x 3 and RN in room    COMMUNICATION/COLLABORATION:   The patients plan of care was discussed with: Registered nurse.      Andrzej Rios, PT   Time Calculation: 15 mins

## 2022-04-10 ENCOUNTER — APPOINTMENT (OUTPATIENT)
Dept: GENERAL RADIOLOGY | Age: 77
DRG: 001 | End: 2022-04-10
Attending: INTERNAL MEDICINE
Payer: MEDICARE

## 2022-04-10 ENCOUNTER — APPOINTMENT (OUTPATIENT)
Dept: GENERAL RADIOLOGY | Age: 77
DRG: 001 | End: 2022-04-10
Attending: NURSE PRACTITIONER
Payer: MEDICARE

## 2022-04-10 LAB
ALBUMIN SERPL-MCNC: 3.1 G/DL (ref 3.5–5)
ALBUMIN/GLOB SERPL: 1.2 {RATIO} (ref 1.1–2.2)
ALP SERPL-CCNC: 109 U/L (ref 45–117)
ALT SERPL-CCNC: 61 U/L (ref 12–78)
ANION GAP SERPL CALC-SCNC: 6 MMOL/L (ref 5–15)
APTT PPP: 49 SEC (ref 22.1–31)
APTT PPP: 62.6 SEC (ref 22.1–31)
APTT PPP: 85.6 SEC (ref 22.1–31)
AST SERPL-CCNC: 188 U/L (ref 15–37)
BILIRUB SERPL-MCNC: 1.8 MG/DL (ref 0.2–1)
BNP SERPL-MCNC: 6352 PG/ML
BUN SERPL-MCNC: 29 MG/DL (ref 6–20)
BUN/CREAT SERPL: 32 (ref 12–20)
CALCIUM SERPL-MCNC: 8.1 MG/DL (ref 8.5–10.1)
CHLORIDE SERPL-SCNC: 102 MMOL/L (ref 97–108)
CO2 SERPL-SCNC: 24 MMOL/L (ref 21–32)
CREAT SERPL-MCNC: 0.92 MG/DL (ref 0.7–1.3)
ERYTHROCYTE [DISTWIDTH] IN BLOOD BY AUTOMATED COUNT: 23.1 % (ref 11.5–14.5)
FERRITIN SERPL-MCNC: 963 NG/ML (ref 26–388)
GLOBULIN SER CALC-MCNC: 2.5 G/DL (ref 2–4)
GLUCOSE BLD STRIP.AUTO-MCNC: 106 MG/DL (ref 65–117)
GLUCOSE BLD STRIP.AUTO-MCNC: 153 MG/DL (ref 65–117)
GLUCOSE BLD STRIP.AUTO-MCNC: 168 MG/DL (ref 65–117)
GLUCOSE BLD STRIP.AUTO-MCNC: 281 MG/DL (ref 65–117)
GLUCOSE SERPL-MCNC: 135 MG/DL (ref 65–100)
HCT VFR BLD AUTO: 26.1 % (ref 36.6–50.3)
HGB BLD-MCNC: 8.6 G/DL (ref 12.1–17)
INR PPP: 1.2 (ref 0.9–1.1)
LACTATE SERPL-SCNC: 1.3 MMOL/L (ref 0.4–2)
LDH SERPL L TO P-CCNC: 319 U/L (ref 85–241)
MAGNESIUM SERPL-MCNC: 2 MG/DL (ref 1.6–2.4)
MCH RBC QN AUTO: 26 PG (ref 26–34)
MCHC RBC AUTO-ENTMCNC: 33 G/DL (ref 30–36.5)
MCV RBC AUTO: 78.9 FL (ref 80–99)
NRBC # BLD: 0.04 K/UL (ref 0–0.01)
NRBC BLD-RTO: 0.5 PER 100 WBC
PLATELET # BLD AUTO: 125 K/UL (ref 150–400)
PMV BLD AUTO: 10.5 FL (ref 8.9–12.9)
POTASSIUM SERPL-SCNC: 3.8 MMOL/L (ref 3.5–5.1)
PROT SERPL-MCNC: 5.6 G/DL (ref 6.4–8.2)
PROTHROMBIN TIME: 12.3 SEC (ref 9–11.1)
RBC # BLD AUTO: 3.31 M/UL (ref 4.1–5.7)
SERVICE CMNT-IMP: ABNORMAL
SERVICE CMNT-IMP: NORMAL
SODIUM SERPL-SCNC: 132 MMOL/L (ref 136–145)
THERAPEUTIC RANGE,PTTT: ABNORMAL SECS (ref 58–77)
TSH SERPL DL<=0.05 MIU/L-ACNC: 2.26 UIU/ML (ref 0.36–3.74)
WBC # BLD AUTO: 7.9 K/UL (ref 4.1–11.1)

## 2022-04-10 PROCEDURE — 74011000250 HC RX REV CODE- 250: Performed by: NURSE PRACTITIONER

## 2022-04-10 PROCEDURE — 93750 INTERROGATION VAD IN PERSON: CPT | Performed by: INTERNAL MEDICINE

## 2022-04-10 PROCEDURE — 84443 ASSAY THYROID STIM HORMONE: CPT

## 2022-04-10 PROCEDURE — 99233 SBSQ HOSP IP/OBS HIGH 50: CPT | Performed by: INTERNAL MEDICINE

## 2022-04-10 PROCEDURE — 71045 X-RAY EXAM CHEST 1 VIEW: CPT

## 2022-04-10 PROCEDURE — 82962 GLUCOSE BLOOD TEST: CPT

## 2022-04-10 PROCEDURE — 74011250637 HC RX REV CODE- 250/637: Performed by: NURSE PRACTITIONER

## 2022-04-10 PROCEDURE — 77010033678 HC OXYGEN DAILY

## 2022-04-10 PROCEDURE — 74011250637 HC RX REV CODE- 250/637: Performed by: INTERNAL MEDICINE

## 2022-04-10 PROCEDURE — 94762 N-INVAS EAR/PLS OXIMTRY CONT: CPT

## 2022-04-10 PROCEDURE — 74011000250 HC RX REV CODE- 250: Performed by: THORACIC SURGERY (CARDIOTHORACIC VASCULAR SURGERY)

## 2022-04-10 PROCEDURE — 83735 ASSAY OF MAGNESIUM: CPT

## 2022-04-10 PROCEDURE — 80053 COMPREHEN METABOLIC PANEL: CPT

## 2022-04-10 PROCEDURE — 74011000250 HC RX REV CODE- 250: Performed by: INTERNAL MEDICINE

## 2022-04-10 PROCEDURE — 74011250636 HC RX REV CODE- 250/636: Performed by: NURSE PRACTITIONER

## 2022-04-10 PROCEDURE — 97116 GAIT TRAINING THERAPY: CPT

## 2022-04-10 PROCEDURE — 85027 COMPLETE CBC AUTOMATED: CPT

## 2022-04-10 PROCEDURE — 83880 ASSAY OF NATRIURETIC PEPTIDE: CPT

## 2022-04-10 PROCEDURE — 85730 THROMBOPLASTIN TIME PARTIAL: CPT

## 2022-04-10 PROCEDURE — 74011250636 HC RX REV CODE- 250/636: Performed by: THORACIC SURGERY (CARDIOTHORACIC VASCULAR SURGERY)

## 2022-04-10 PROCEDURE — 82728 ASSAY OF FERRITIN: CPT

## 2022-04-10 PROCEDURE — 36415 COLL VENOUS BLD VENIPUNCTURE: CPT

## 2022-04-10 PROCEDURE — 83605 ASSAY OF LACTIC ACID: CPT

## 2022-04-10 PROCEDURE — 85610 PROTHROMBIN TIME: CPT

## 2022-04-10 PROCEDURE — 74011636637 HC RX REV CODE- 636/637: Performed by: NURSE PRACTITIONER

## 2022-04-10 PROCEDURE — 97530 THERAPEUTIC ACTIVITIES: CPT

## 2022-04-10 PROCEDURE — 77030037442 HC TY LVAD MGMT SYST CMP-B

## 2022-04-10 PROCEDURE — 83615 LACTATE (LD) (LDH) ENZYME: CPT

## 2022-04-10 PROCEDURE — 65610000003 HC RM ICU SURGICAL

## 2022-04-10 PROCEDURE — 2709999900 HC NON-CHARGEABLE SUPPLY

## 2022-04-10 RX ORDER — BISACODYL 5 MG
5 TABLET, DELAYED RELEASE (ENTERIC COATED) ORAL ONCE
Status: COMPLETED | OUTPATIENT
Start: 2022-04-10 | End: 2022-04-10

## 2022-04-10 RX ORDER — POTASSIUM CHLORIDE 750 MG/1
20 TABLET, FILM COATED, EXTENDED RELEASE ORAL 2 TIMES DAILY
Status: DISCONTINUED | OUTPATIENT
Start: 2022-04-10 | End: 2022-04-15

## 2022-04-10 RX ORDER — POTASSIUM CHLORIDE 29.8 MG/ML
20 INJECTION INTRAVENOUS ONCE
Status: COMPLETED | OUTPATIENT
Start: 2022-04-10 | End: 2022-04-11

## 2022-04-10 RX ORDER — POLYETHYLENE GLYCOL 3350 17 G/17G
17 POWDER, FOR SOLUTION ORAL 2 TIMES DAILY
Status: DISCONTINUED | OUTPATIENT
Start: 2022-04-10 | End: 2022-04-16

## 2022-04-10 RX ORDER — HEPARIN SODIUM 1000 [USP'U]/ML
2000 INJECTION, SOLUTION INTRAVENOUS; SUBCUTANEOUS ONCE
Status: COMPLETED | OUTPATIENT
Start: 2022-04-10 | End: 2022-04-10

## 2022-04-10 RX ORDER — AMOXICILLIN 250 MG
1 CAPSULE ORAL 2 TIMES DAILY
Status: DISCONTINUED | OUTPATIENT
Start: 2022-04-10 | End: 2022-04-16

## 2022-04-10 RX ORDER — WARFARIN SODIUM 5 MG/1
5 TABLET ORAL ONCE
Status: COMPLETED | OUTPATIENT
Start: 2022-04-10 | End: 2022-04-10

## 2022-04-10 RX ADMIN — SUCRALFATE 1 G: 1 TABLET ORAL at 17:57

## 2022-04-10 RX ADMIN — FAMOTIDINE 20 MG: 20 TABLET ORAL at 09:58

## 2022-04-10 RX ADMIN — CHLORHEXIDINE GLUCONATE 10 ML: 1.2 RINSE ORAL at 09:59

## 2022-04-10 RX ADMIN — MIRTAZAPINE 7.5 MG: 15 TABLET, FILM COATED ORAL at 21:54

## 2022-04-10 RX ADMIN — FAMOTIDINE 20 MG: 20 TABLET ORAL at 21:53

## 2022-04-10 RX ADMIN — HYDRALAZINE HYDROCHLORIDE 10 MG: 20 INJECTION INTRAMUSCULAR; INTRAVENOUS at 02:45

## 2022-04-10 RX ADMIN — ACETAMINOPHEN 650 MG: 325 TABLET ORAL at 09:00

## 2022-04-10 RX ADMIN — AMIODARONE HYDROCHLORIDE 400 MG: 200 TABLET ORAL at 17:57

## 2022-04-10 RX ADMIN — HYDRALAZINE HYDROCHLORIDE 75 MG: 50 TABLET, FILM COATED ORAL at 08:00

## 2022-04-10 RX ADMIN — SENNOSIDES AND DOCUSATE SODIUM 1 TABLET: 50; 8.6 TABLET ORAL at 17:57

## 2022-04-10 RX ADMIN — MUPIROCIN: 20 OINTMENT TOPICAL at 09:59

## 2022-04-10 RX ADMIN — POLYETHYLENE GLYCOL 3350 17 G: 17 POWDER, FOR SOLUTION ORAL at 17:59

## 2022-04-10 RX ADMIN — POLYETHYLENE GLYCOL 3350 17 G: 17 POWDER, FOR SOLUTION ORAL at 09:58

## 2022-04-10 RX ADMIN — GABAPENTIN 100 MG: 100 CAPSULE ORAL at 09:01

## 2022-04-10 RX ADMIN — DOBUTAMINE HYDROCHLORIDE 6 MCG/KG/MIN: 400 INJECTION INTRAVENOUS at 04:05

## 2022-04-10 RX ADMIN — GABAPENTIN 100 MG: 100 CAPSULE ORAL at 18:00

## 2022-04-10 RX ADMIN — POTASSIUM CHLORIDE 20 MEQ: 750 TABLET, EXTENDED RELEASE ORAL at 09:58

## 2022-04-10 RX ADMIN — SODIUM CHLORIDE 6 ML/HR: 9 INJECTION, SOLUTION INTRAVENOUS at 03:24

## 2022-04-10 RX ADMIN — BUMETANIDE 1 MG: 0.25 INJECTION, SOLUTION INTRAMUSCULAR; INTRAVENOUS at 03:17

## 2022-04-10 RX ADMIN — SODIUM CHLORIDE 10 ML/HR: 4.5 INJECTION, SOLUTION INTRAVENOUS at 03:24

## 2022-04-10 RX ADMIN — HYDRALAZINE HYDROCHLORIDE 5 MG: 20 INJECTION INTRAMUSCULAR; INTRAVENOUS at 17:27

## 2022-04-10 RX ADMIN — BISACODYL 5 MG: 5 TABLET, COATED ORAL at 12:34

## 2022-04-10 RX ADMIN — ACETAMINOPHEN 650 MG: 325 TABLET ORAL at 17:56

## 2022-04-10 RX ADMIN — FENTANYL CITRATE 25 MCG: 0.05 INJECTION, SOLUTION INTRAMUSCULAR; INTRAVENOUS at 02:51

## 2022-04-10 RX ADMIN — ASPIRIN 81 MG CHEWABLE TABLET 81 MG: 81 TABLET CHEWABLE at 09:58

## 2022-04-10 RX ADMIN — SODIUM CHLORIDE, PRESERVATIVE FREE 10 ML: 5 INJECTION INTRAVENOUS at 18:01

## 2022-04-10 RX ADMIN — HYDRALAZINE HYDROCHLORIDE 75 MG: 50 TABLET, FILM COATED ORAL at 21:53

## 2022-04-10 RX ADMIN — AMIODARONE HYDROCHLORIDE 400 MG: 200 TABLET ORAL at 09:58

## 2022-04-10 RX ADMIN — SUCRALFATE 1 G: 1 TABLET ORAL at 06:32

## 2022-04-10 RX ADMIN — SUCRALFATE 1 G: 1 TABLET ORAL at 21:54

## 2022-04-10 RX ADMIN — SENNOSIDES AND DOCUSATE SODIUM 1 TABLET: 50; 8.6 TABLET ORAL at 09:58

## 2022-04-10 RX ADMIN — BUMETANIDE 1 MG: 0.25 INJECTION, SOLUTION INTRAMUSCULAR; INTRAVENOUS at 12:34

## 2022-04-10 RX ADMIN — SUCRALFATE 1 G: 1 TABLET ORAL at 11:44

## 2022-04-10 RX ADMIN — HYDRALAZINE HYDROCHLORIDE 75 MG: 50 TABLET, FILM COATED ORAL at 12:34

## 2022-04-10 RX ADMIN — SODIUM CHLORIDE, PRESERVATIVE FREE 10 ML: 5 INJECTION INTRAVENOUS at 21:54

## 2022-04-10 RX ADMIN — Medication 3 MG: at 02:51

## 2022-04-10 RX ADMIN — Medication 3 UNITS: at 11:44

## 2022-04-10 RX ADMIN — Medication 22 UNITS/KG/HR: at 13:36

## 2022-04-10 RX ADMIN — SODIUM CHLORIDE, PRESERVATIVE FREE 10 ML: 5 INJECTION INTRAVENOUS at 06:32

## 2022-04-10 RX ADMIN — WARFARIN SODIUM 5 MG: 5 TABLET ORAL at 17:57

## 2022-04-10 RX ADMIN — HYDRALAZINE HYDROCHLORIDE 75 MG: 50 TABLET, FILM COATED ORAL at 17:56

## 2022-04-10 RX ADMIN — HEPARIN SODIUM 2000 UNITS: 1000 INJECTION INTRAVENOUS; SUBCUTANEOUS at 13:17

## 2022-04-10 RX ADMIN — ACETAMINOPHEN 650 MG: 325 TABLET ORAL at 02:51

## 2022-04-10 RX ADMIN — POTASSIUM CHLORIDE 20 MEQ: 29.8 INJECTION, SOLUTION INTRAVENOUS at 06:31

## 2022-04-10 RX ADMIN — HYDRALAZINE HYDROCHLORIDE 5 MG: 20 INJECTION INTRAMUSCULAR; INTRAVENOUS at 13:27

## 2022-04-10 RX ADMIN — POTASSIUM CHLORIDE 20 MEQ: 750 TABLET, EXTENDED RELEASE ORAL at 17:57

## 2022-04-10 RX ADMIN — FENTANYL CITRATE 25 MCG: 0.05 INJECTION, SOLUTION INTRAMUSCULAR; INTRAVENOUS at 09:54

## 2022-04-10 NOTE — PROGRESS NOTES
Problem: Mobility Impaired (Adult and Pediatric)  Goal: *Acute Goals and Plan of Care (Insert Text)  Description: FUNCTIONAL STATUS PRIOR TO ADMISSION: Patient was independent and active without use of DME.    HOME SUPPORT PRIOR TO ADMISSION: The patient lived with his wife but did not require assist.    Physical Therapy Goals  Initiated 4/8/2022  1. Patient will move from supine to sit and sit to supine , scoot up and down, and roll side to side in bed with supervision/set-up within 7 days. 2.  Patient will perform sit to/from stand with supervision/set-up within 7 days. 3.  Patient will ambulate 50 feet with least restrictive assistive device and minimal assistance/contact guard assist within 7 days. 4.  Patient will ascend/descend 6 stairs with handrail(s) with minimal assistance/contact guard assist within 14 days. 5.  Patient will perform cardiac exercises per protocol with supervision/set-up within 7 days. 6.  Patient will verbally and functionally recall 3/3 sternal precautions within 7 days. 7.  Patient will perform power exchange for power module to/from battery with minimal assistance within 7 days. Outcome: Progressing Towards Goal   PHYSICAL THERAPY TREATMENT  Patient: Pritesh Jiménez (71 y.o. male)  Date: 4/10/2022  Diagnosis: HFrEF (heart failure with reduced ejection fraction) ([facility]) [I50.20] <principal problem not specified>  Procedure(s) (LRB):  REDO STERNOTOMY; RIGHT GROIN CUTDOWN FOR CANNULATION;  INSERTION OF LEFT VENTRICULAR ASSIST DEVICE (HMIII); AORTIC VALVE CLOSURE CHEL BY DR. Shanthi Obando. (N/A) 5 Days Post-Op  Precautions: Fall (move in the tube, LVAD)  Chart, physical therapy assessment, plan of care and goals were reviewed. ASSESSMENT  Patient continues with skilled PT services and is progressing towards goals. Patient received supine in bed, agreeable to therapy.  Good recall of terminology for LVAD and able to perform self test without cues, also able to report importance of locating and securing  prior to changing position. Overall min A-CGA for mobility including gait around CVICU with mild path deviation and light min A. Did not florian vest and PT held batteries, will need to secure vest tomorrow. Able to perform switch overs as below with intermittent verbal cues and supervision throughout. Anticipate excellent progress. Current Level of Function Impacting Discharge (mobility/balance): min A    Other factors to consider for discharge: new LVAD          PLAN :  Patient continues to benefit from skilled intervention to address the above impairments. Continue treatment per established plan of care. to address goals. Recommendation for discharge: (in order for the patient to meet his/her long term goals)  Physical therapy at least 2 days/week in the home     This discharge recommendation:  Has been made in collaboration with the attending provider and/or case management    IF patient discharges home will need the following DME: to be determined (TBD)       SUBJECTIVE:   Patient stated Just sore.     OBJECTIVE DATA SUMMARY:   Cardiac diagnosis intervention:      Critical Behavior:  Neurologic State: Alert  Orientation Level: Oriented X4  Cognition: Follows commands,Appropriate decision making,Appropriate for age attention/concentration,Appropriate safety awareness  Functional Mobility Training:  Bed Mobility:  Rolling: Minimum assistance  Supine to Sit: Minimum assistance  Transfers:  Sit to Stand: Minimum assistance  Stand to Sit: Minimum assistance;Contact guard assistance (poor eccentric control)  Balance:  Sitting: Intact  Standing: Impaired; Without support  Standing - Static: Good  Standing - Dynamic : Fair  Ambulation/Gait Training:  Distance (ft): 120 Feet (ft)  Assistive Device: Gait belt (4L/min)  Ambulation - Level of Assistance: Contact guard assistance;Minimal assistance  Gait Abnormalities: Decreased step clearance; Path deviations  Base of Support: Widened  Speed/Charlene: Pace decreased (<100 feet/min)  Step Length: Right shortened;Left shortened      VAD power transition  Patient performed switchover from power module to battery. Completed the following tasks:   Independent Verbal cues Physical assist Comments   Don holster vest    N/a   Check batteries       Check  x      Ensure  clipped onto stabilization belt    N/a   Position batteries in clips x      Disconnect power leads  X- supervision     Insert power lead into battery  X- supervision/cues     [place] batteries in holster     N/a   Secure batteries with velcro and clips    N/a     Patient performed switchover from battery to power module. Completed the following tasks:   Independent Verbal cues Physical assistance Comments   Remove batteries from holster    N/a    Disconnect power leads from battery  X- supervision     Reconnect power leads into power module  x     Remove batteries from clips       Doff holster vest    N/a        Pain Rating:  \"sore\"    Activity Tolerance:   Good and requires rest breaks      After treatment patient left in no apparent distress:   Sitting in chair and Call bell within reach    COMMUNICATION/COLLABORATION:   The patients plan of care was discussed with: Registered nurse.      Man Cesar, PT, DPT   Time Calculation: 34 mins

## 2022-04-10 NOTE — PROGRESS NOTES
0000: [facility] pt[facility] Resting comfortably in bed; assumed care of the patient. Continuous NICOM connected/set up as ordered. 0245: brief, self-limited low flow alarm. MAP noted to be 91- PRN hydralazine given as ordered. 0315: CVP noted to be sustained >12-14 goal (currently @ 16) PRN bumex given as ordered. 3536-4338: Brief low flow alarm - stopped without intervention; MAP noted to be 85, 0900 PO hydralazine given @ 0800.    0800:Bedside and Verbal shift change report given to Nihcolas Ashford (oncoming nurse) by Sheila Barba (offgoing nurse). Report included the following information SBAR, Intake/Output, MAR, Recent Results, Cardiac Rhythm NSR/sinus arrythmia & PACS and Alarm Parameters .

## 2022-04-10 NOTE — PROGRESS NOTES
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 Intra-op course course complicated by bleeding requiring multiple rounds of pRBC's, platelets, FFP, cellsaver, and cryoprecipitate. Intra-op CHEL significant for moderate to severe RV dysfunction. ICU issues include:    1) Cardiogenic shock    General/neuro: The patient is alert and oriented. No acute issues. Respiratory: Encourage mobility, incentive spirometry, respiratory hygiene measures. Cardiac: Status post HeartMate 3 LVAD and aortic valve closure, day 5. Slow dobutamine wean in progress. P.o. antihypertensives for afterload reduction. LVAD flows at 4600 RPMs, no device related issues. Further management as per heart failure service. GI: Advance diet as tolerated. ID: White count stable at 7.9. No need for antibiotic therapy. Renal: Creatinine at 0.92 from 0.87. Bumex as needed. Prophylaxis: Weight-based heparin for therapeutic anticoagulation. Subjective:   Progress Note: 4/10/2022      Reason for ICU Admission: Post LVAD    HPI: As Above    Overnight Events:   4/10/2022      POD:  Day of Surgery    S/P:   Procedure(s):  REDO STERNOTOMY; RIGHT GROIN CUTDOWN FOR CANNULATION;  INSERTION OF LEFT VENTRICULAR ASSIST DEVICE (HMIII); AORTIC VALVE CLOSURE CHEL BY DR. Lorenza Davila.     Active Problem List:     Problem List  Date Reviewed: 3/17/2022          Codes Class    S/P ventricular assist device [facility]) ICD-10-CM: R88.019  ICD-9-CM: V45.89     Overview Signed 2022  2:22 PM by Charlette Choi PA     REDO STERNOTOMY   RIGHT GROIN CUTDOWN FOR CANNULATION with access of CVF and CFA  INSERTION OF LEFT VENTRICULAR ASSIST DEVICE (HMIII)  AORTIC VALVE CLOSURE with Park's stitch             HFrEF (heart failure with reduced ejection fraction) ([facility]) ICD-10-CM: I50.20  ICD-9-CM: 428.20         NSTEMI (non-ST elevated myocardial infarction) ([facility]) ICD-10-CM: I21.4  ICD-9-CM: 410.70         Acute on chronic clinical systolic heart failure ([facility]) ICD-10-CM: I50.23  ICD-9-CM: 428.23         Active advance directive on file ICD-10-CM: Z78.9  ICD-9-CM: V49.89         Type 2 diabetes, controlled, with peripheral circulatory disorder ([facility]) ICD-10-CM: E11.51  ICD-9-CM: 250.70, 443.81         CHF, stage C ([facility] ICD-10-CM: I50.9  ICD-9-CM: 428.0         Mixed hyperlipidemia ICD-10-CM: E78.2  ICD-9-CM: 272.2         Proteinuria ICD-10-CM: R80.9  ICD-9-CM: 791.0         BPH without obstruction/lower urinary tract symptoms ICD-10-CM: N40.0  ICD-9-CM: 600.00         Hematuria, gross ICD-10-CM: R31.0  ICD-9-CM: 599.71         Cardiac arrest ([facility] ICD-10-CM: I46.9  ICD-9-CM: 427.5         Insomnia, unspecified ICD-10-CM: G47.00  ICD-9-CM: 780.52         Encounter for long-term (current) use of other medications ICD-10-CM: Z79.899  ICD-9-CM: V58.69         Calculus of kidney ICD-10-CM: N20.0  ICD-9-CM: 592.0         Coronary atherosclerosis of native coronary artery ICD-10-CM: I25.10  ICD-9-CM: 414.01         Benign neoplasm of colon ICD-10-CM: D12.6  ICD-9-CM: 211.3         Unspecified sleep apnea ICD-10-CM: G47.30  ICD-9-CM: 780.57         Reflux esophagitis ICD-10-CM: K21.00  ICD-9-CM: 530.11               Past Medical History:      has a past medical history of CAD (coronary artery disease) ('90 '97, '13 x 2), Calculus of kidney, Colonic polyps, Congestive heart failure, unspecified, Diabetes ([facility] Gastritis, Hypercholesterolemia, and Sleep apnea.     Past Surgical History:      has a past surgical history that includes hx heent; colonoscopy (04/06/2011); endoscopy, colon, diagnostic; hx coronary artery bypass graft (8/22/12); hx pacemaker placement (1/30/13); pr cardiac surg procedure unlist (2012); and colonoscopy (N/A, 3/2/2022). Home Medications:     Prior to Admission medications    Medication Sig Start Date End Date Taking? Authorizing Provider   potassium chloride SR (K-TAB) 20 mEq tablet Take 2 Tablets by mouth two (2) times a day. 3/9/22  Yes Anna Wong NP   milrinone (PRIMACOR) 20 mg/100 mL (200 mcg/mL) infusion 28.5 mcg/min by IntraVENous route continuous. 3/3/22  Yes Anna Wong NP   tamsulosin (FLOMAX) 0.4 mg capsule TAKE 1 CAPSULE DAILY 3/2/22  Yes Raymond Gustafson MD   bumetanide (BUMEX) 2 mg tablet Take 1 Tablet by mouth two (2) times a day. Patient taking differently: Take 2 mg by mouth two (2) times a day. 2 mg in am 1mg in evening 2/11/22  Yes Masood Talavera NP   prasugreL (Effient) 10 mg tablet Take 10 mg by mouth daily. Yes Provider, Historical   metFORMIN (GLUCOPHAGE) 500 mg tablet Take 1 Tablet by mouth two (2) times daily (with meals). 1/11/22  Yes Raymond Gustafson MD   ranolazine ER (Ranexa) 500 mg SR tablet Take 1 Tablet by mouth two (2) times a day. 1/11/22  Yes Raymond Gustafson MD   eplerenone (INSPRA) 50 mg tab tablet Take 1 Tablet by mouth daily. 1/11/22  Yes Raymond Gustafson MD   isosorbide mononitrate ER (IMDUR) 30 mg tablet Take 1 Tablet by mouth every twelve (12) hours. 1/4/22  Yes Mona Zuniga NP   hydrALAZINE (APRESOLINE) 50 mg tablet TAKE 1 TABLET BY MOUTH THREE TIMES DAILY 12/17/21  Yes Megan Stewart NP   dapagliflozin Laura Tameka) 10 mg tab tablet Take 1 Tablet by mouth daily.  11/12/21  Yes Kelsea Howe NP   omeprazole (PRILOSEC) 20 mg capsule TAKE 1 CAPSULE BY MOUTH DAILY FOR INDIGESTION 7/6/21  Yes Will Lorraine PAREDES NP   rosuvastatin (CRESTOR) 10 mg tablet TAKE 1 TABLET NIGHTLY 4/26/21  Yes Raymond Gustafson, MD   aspirin delayed-release 81 mg tablet Take 81 mg by mouth daily. Yes Provider, Historical   benzonatate (TESSALON) 200 mg capsule Take 200 mg by mouth three (3) times daily as needed for Cough. Patient not taking: Reported on 3/7/2022    Provider, Historical   diphenhydrAMINE (Benadryl Allergy) 25 mg tablet Take 25 mg by mouth every six (6) hours as needed. Needed every night d/t picc line causing itching  Patient not taking: Reported on 4/3/2022    Provider, Historical   glucose blood VI test strips (OneTouch Ultra Test) strip USE TO TEST BLOOD SUGAR DAILY 21   Yamilet Gustafson MD   OneTouch Delica Plus Lancet 33 gauge misc USE TO TEST BLOOD SUGAR DAILY 21   Yamilet Gustafson MD   lancets misc Use to test blood sugar daily 20   Yamilet Gustafson MD   acetaminophen (TYLENOL EXTRA STRENGTH) 500 mg tablet Take  by mouth every six (6) hours as needed. Patient not taking: Reported on 3/17/2022    Provider, Historical       Allergies/Social/Family History: Allergies   Allergen Reactions    Oxycodone Anaphylaxis    Pcn [Penicillins] Hives      Social History     Tobacco Use    Smoking status: Never Smoker    Smokeless tobacco: Never Used   Substance Use Topics    Alcohol use: Yes     Alcohol/week: 0.0 standard drinks     Comment:  VERY RARE      Family History   Problem Relation Age of Onset    Heart Disease Mother         MI    Heart Disease Father         CAD & PVD    Lung Disease Father     Cancer Father         lung    Diabetes Maternal Grandmother     Heart Disease Other         CAD    Stroke Sister        Review of Systems:     A comprehensive review of systems was negative except for that written in the HPI.     Objective:   Vital Signs:  Visit Vitals  BP (!) 89/61   Pulse 81   Temp 98 °F (36.7 °C)   Resp 20   Ht 6' 1\" (1.854 m)   Wt 87.6 kg (193 lb 2 oz)   SpO2 99%   BMI 25.48 kg/m²    O2 Flow Rate (L/min): 4 l/min O2 Device: Nasal cannula Temp (24hrs), Av.1 °F (36.7 °C), Min:97.4 °F (36.3 °C), Max:98.6 °F (37 °C)    CVP (mmHg): 13 mmHg (04/10/22 0900)      Intake/Output:     Intake/Output Summary (Last 24 hours) at 4/10/2022 1010  Last data filed at 4/10/2022 0900  Gross per 24 hour   Intake 1758.28 ml   Output 3702 ml   Net -1943.72 ml       Physical Exam:    General appearance: no distress, appears stated age  Respiratory: Decreased breath sounds b/l  Cardiac: Normal Sinus rhythm   Abdomen: soft, hypoactive sounds  Extremities: warm, perfused. LABS AND  DATA: Personally reviewed  Recent Labs     04/10/22  0300 04/09/22  0424   WBC 7.9 7.7   HGB 8.6* 8.3*   HCT 26.1* 24.9*   * 104*     Recent Labs     04/10/22  0300 04/09/22  0424   * 132*   K 3.8 3.8    103   CO2 24 23   BUN 29* 30*   CREA 0.92 0.87   * 107*   CA 8.1* 8.3*   MG 2.0 2.3     Recent Labs     04/10/22  0300 04/09/22  0424    90   TP 5.6* 6.0*   ALB 3.1* 3.1*   GLOB 2.5 2.9     Recent Labs     04/10/22  0300 04/09/22 2015 04/09/22  1208 04/09/22  0424   INR 1.2*  --   --  1.2*   PTP 12.3*  --   --  12.0*   APTT 62.6* 55.5*   < > 50.4*    < > = values in this interval not displayed. No results for input(s): PHI, PCO2I, PO2I, FIO2I in the last 72 hours. No results for input(s): CPK, CKMB, TROIQ, BNPP in the last 72 hours.     Hemodynamics:   PAP: PAP Systolic: 38 (39/78/33 3479) CO: CO (l/min): 4.7 l/min (04/09/22 1400)   Wedge:   CI: CI (l/min/m2): 2.4 l/min/m2 (04/09/22 1400)   CVP:  CVP (mmHg): 13 mmHg (04/10/22 0900) SVR:       PVR:       Ventilator Settings:  Mode Rate Tidal Volume Pressure FiO2 PEEP   Spontaneous,Pressure support   520 ml  5 cm H2O 40 % 5 cm H20     Peak airway pressure: 18 cm H2O    Minute ventilation: 7.71 l/min        MEDS: Reviewed    Chest X-Ray:  CXR Results  (Last 48 hours)               04/10/22 0235  XR CHEST PORT Final result    Impression:  Curvilinear lucency overlying the left hemithorax is likely a skinfold, and less   likely a pneumothorax. Tubes and lines are unchanged. Short-term follow-up study   is recommended. Narrative:  INDICATION:    HF        EXAMINATION:  AP CHEST, PORTABLE       COMPARISON: 4/9/2022       FINDINGS: Single AP portable view of the chest at 4:15 hours demonstrates no   change in position of the lines and tubes. The cardiomediastinal silhouette is   stable. LVAD is noted. There is no new airspace disease. Curvilinear line   overlying the left hemithorax is likely a skinfold. Chest tubes are present. 04/09/22 0503  XR CHEST PORT Final result    Impression:  1. No significant change in the appearance of the chest or support hardware. Narrative:  INDICATION: . HF   COMPARISON: Previous chest xray, yesterday. LIMITATIONS: Portable technique. Magno Case FINDINGS: Single frontal view of the chest.    .   Lines/tubes/surgical: No significant change in the appearance of the right IJ   approach Lisman-Esnthil catheter, mediastinal/chest drains, LVAD and cardiac assist   device. Heart/mediastinum: Unremarkable. Lungs/pleura: Hazy opacity in the left midlung and left base with obscured left   hemidiaphragm. No visible pneumothorax. Additional Comments: None. Magno Case ECHO:        Multidisciplinary Rounds Completed: Yes    ABCDEF Bundle/Checklist Completed:  Yes    SPECIAL EQUIPMENT  PA Catheter    DISPOSITION  Stay in ICU    CRITICAL CARE CONSULTANT NOTE  I had a face to face encounter with the patient, reviewed and interpreted patient data including clinical events, labs, images, vital signs, I/O's, and examined patient. I have discussed the case and the plan and management of the patient's care with the consulting services, the bedside nurses and the respiratory therapist.      NOTE OF PERSONAL INVOLVEMENT IN CARE   This patient has a high probability of imminent, clinically significant deterioration, which requires the highest level of preparedness to intervene urgently.  I participated in the decision-making and personally managed or directed the management of the following life and organ supporting interventions that required my frequent assessment to treat or prevent imminent deterioration. I personally spent 50 minutes of critical care time. This is time spent at this critically ill patient's bedside actively involved in patient care as well as the coordination of care. This does not include any procedural time which has been billed separately.     Jesús Williamson, DO  Staff Intensivist/Anesthesiologist  [facility]  4/10/2022

## 2022-04-10 NOTE — PROGRESS NOTES
[facility] in [place], [place]  Inpatient Progress Note      Patient name: Nette Peterson  Patient : 1945  Patient MRN: [id number]  Consulting MD: Tom Morales MD  Date of service: 04/10/22    REASON FOR REFERRAL:  Management of LVAD     PLAN OF CARE:   · 68 y/o with severe ischemic cardiomyopathy, LVEF 15-20%; stage D, NYHA class IV symptoms; s/p LVAD implant with HM3 as DT due to age (23)  · Postoperative course:  ? Bleeding intra-op, required cryo, k-centra, FFP, Plt, and cellsaver in OR; resolved  ? RV dysfunction requiring prolonged inotropic support with dobutamine  ?  Routine postoperative care     RECOMMENDATIONS:   POD#5 from LVAD Implant  Continue speed 4800rpms, low speed 4400  Continue dobutamine to 6 mcg/kg/min, keep this dose and no plans to wean   Continue hydralazine to 75mg PO QID  Continue amiodarone to 400mg PO twice daily  Increase potassium to 20meq twice daily  Bumex 1mg IV as needed for CVP > 12 (goal 12-14) to prevent LV suckdown  Cannot tolerate ARB/ARNi until Cr stable  Heparin gtt per protocol  Increase coumadin 5mg at 18:00, goal INR 2-3  ASA 81mg daily  Continue gabapentin 100mg BID  Continue Toradol q 6 for 25 hours- watch renal function  Continue Lidocaine patch  Ferritin > 700- no venofer- repeat next week   Driveline dressing changes daily for now until 5/3/22  Continue fentanyl patch for better pain control (pt with allergy to oxycodone)  Intensify bowel regimen; miralax twice daily, pericolace twice daily add dulcolax 5mg PO  Low dose Remeron  Pulmonary toilet, monitor CT output   SSI   CPAP QHS  Advanced care plan forms on file   Strict I/O, daily weights, Na+ restricted diet   Continue VAD education      IMPRESSION:  S/p LVAD implant as DT on 22 with Dr. Mare Hardy   chronic systolic heart failure  · Stage D, NYHA class IV symptoms improved to class II on inotropes  · Non-ischemic cardiomyopathy, LVEF 20% with LVEDD 6. 2  · RV dysfunction, TAPSE 1.22 (prelimnary read)  · TBili 1.5 likely from cardiac congestion  At risk of sudden cardiac death  · Recent cardiac arrest   · S/p ICD (1/2013, [address] followed by [facility]); New implant on 10/21/2021 [place] [employer]  Coronary artery disease  · S/p multiple interventions  · S/p 4V CABG (8/2012)  · LHC (7/2019) severe stenosis of LIMA to LAD anastomosis site, SVG graft to OM is occluded, SVG graft to RCA 40-50%, LAD occluded proximally, severe proximal LCx, RCA is the long . · PET (6/2019) EF 24% with anterior lateral and inferior reversible defect  Cardiac risk factors  · HTN  · HL  · DM2  · SRUTHI on CPAP  · MIld carotid stenosis  Anemia, microcytic  · Iron deficiency  DVT with filter  Pulmonary nodules   Dysphagia      Interval Events:  No issues overnight  Pain well controlled  MAPs at goal  CVP around 13  Dobutamine 6  CBC stable   BMP stable  CXR clear ?skin fold     CARDIAC IMAGING:  Echo (4/8/22)    Left Ventricle: Left ventricle size is normal. Moderately increased wall thickness. Findings consistent with concentric remodeling. Severe global hypokinesis present. Severely reduced left ventricular systolic function with a visually estimated EF of 10 -15%. 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   Aortic Valve: Moderate sclerosis of the aortic valve cusp.   Mitral Valve: Moderately thickened leaflet. Mild transvalvular regurgitation.   Tricuspid Valve: Severe transvalvular regurgitation.   Right Atrium: Right atrium is severely dilated.     Echo (3/2/22)   · LV 10-15%  · Mod-severe MR      Echo (11/23/21):  · LV 15-20%. · Mod-severe, MR, mod-TR     Echo (5/20/21)  · LV: Estimated LVEF is 20 - 25%. Normal wall thickness. Mildly dilated left ventricle. Severely and globally reduced systolic function. Severe (grade 4) left ventricular diastolic dysfunction. · LA: Severely dilated left atrium.   · RA: Severely dilated right atrium. · MV: Moderate mitral valve regurgitation is present. · TV: Moderate tricuspid valve regurgitation is present. · AO: Mild aortic root dilatation. · RV: Pacer/ICD present. · LVED 6.2cm     EKG (5/20/21) SB with 1degree AV block, QRS 116ms     LHC (5/24/21) Native Coronaries: LM - moderate to severe distal disease 50%. % prox occluded (), heavily calcified, LCx: 80% proximal stenosis. OM1 is  (seen filling faintly via collaterals). OM2 99% stenosis. RCA: 100% proximal occluded.  LIMA to LAD: patent, supplies only a diagonal branch (probably D2). The LAD distal to the bifurcation is 100% occluded () and fills via right to left collaterals off the SVG to RCA. SVG to R-PDA: patent with moderate diffuse irregularities but no obstructive disease in the graft.    No appealing interventional targets.      NST as above     ICD Interrogation (11/12/2021) [employer] VVI, thoracic impedence trending up, no events, normal device function and good battery  ICD interrogation (5/12/21) Canal do [employer] single lead, no events, normal device function and good battery     HEMODYNAMICS:  RHC 5/24/21 CI 1.97, PA 33/9/17, RA 1, PCWP 6- off milrinone   CPEST not done     Patient underwent a 6 minute walk test 11/12/2021     6 Min Walk Report 11/12/2021 7/6/2021 5/20/2021   (PRE) HR 88 98 74   (PRE) O2 Sat 100 - 99   (POST)  - 81   (POST) O2 Sat 99 98% on Ra 99   Distance in Meters 386.58 - 1158. 24       OTHER IMAGING:  CXR (6/17/21) clear  CT 5/21/21 1. Irregular pulmonary nodule in the left upper lobe measuring 2 cm, suspicious for primary pulmonary malignancy. 2. Additional 6 mm left upper lobe pulmonary nodule. 3. Severe calcific atherosclerosis of the coronary arteries and abdominal aorta. No aneurysm. 4. Cardiomegaly.  5. No acute abnormality within the chest, abdomen, or pelvis.      HISTORY OF PRESENT ILLNESS:  Briefly, Melia Wood is a 69 y.o. male with h/o HTN, HL, DM2, SRUTHI on CPAP, chronic systolic heart failure, stage D, NYHA class IV symptoms, non-ischemic cardiomyopathy, LVEF 20% with LVEDD 6.2, RV dysfunciton, TAPSE 1.22, TBili 1.5 likely from cardiac congestion, recent cardiac arrest s/p ICD (1/2013, [employer] followed by [facility]), coronary artery disease s/p multiple interventions s/p 4V CABG (8/2012), LHC (7/2019) severe stenosis of LIMA to LAD anastomosis site, SVG graft to OM is occluded, SVG graft to RCA 40-50%, LAD occluded proximally, severe proximal LCx, RCA is the long . PET (6/2019) EF 24% with anterior lateral and inferior reversible defect. Anemia, microcytic with iron deficiency, DVT with filter.     Patient was referred to [facility] by Dr. Rosy Hurtado for evaluation of his candidacy for advanced therapies.     Patient agreed to inpatient initiation of palliative infusion of chronic inotropes and evaluation for LVAD-DT. Patient was admitted 5/2-5/25/21. Patient was started on milrinone infusion and had first part of LVAD evaluation completed. Right and left heart cath on 5/24/21 that showed severe diffuse native vessel CAD, with patent grafts (LIMA to D2, SVG to RCA).  Antiplatelet therapy was held during hospitalization and after discharge due to anticipated pulmonary nodule biopsy, bone marrow biopsy and EGD/C-Scope as part of LVAD workup.     Patient was readmitted 5/26-5/28/21 with hypertension, headache and chest pain, his troponin peaked at 10; he was shortly bridged with heparin, otherwise had normal EKG and chest CT negative for PE. Cardiology and AHF service was consulted and patient was discharged home after troponin came down.      Patient has now completed radiation treatment for adenocarcinoma of the lung.  Hem-onc has cleared patient for VAD implant with 90% 2 year prognosis.      He was most recently admitted for elective pre-op EGD and colonoscopy which he tolerated well; path negative for malignancy.      He presents today for elective admission to [facility] for hemodynamic optimization prior to LVAD implant. LVAD INTERROGATION:  Device interrogated in person  Device function normal, normal flow, no events  LVAD   Pump Speed (RPM): 4850  Pump Flow (LPM): 2.6  PI (Pulsitility Index): 10.6  Power: 3.1   Test: Yes  Back Up  at Bedside & Labeled: Yes  Power Module Test: No  Driveline Site Care: No  Driveline Dressing: Clean, Dry, and Intact  Testing  Alarms Reviewed: Yes  Back up SC speed: 4800  Back up Low Speed Limit: 4400  Emergency Equipment with Patient?: Yes  Emergency procedures reviewed?: Yes  Drive line site inspected?: No  Drive line intergrity inspected?: Yes  Drive line dressing changed?: No    PHYSICAL EXAM:  Visit Vitals  BP (!) 89/61   Pulse 81   Temp 98 °F (36.7 °C)   Resp 20   Ht 6' 1\" (1.854 m)   Wt 193 lb 2 oz (87.6 kg)   SpO2 99%   BMI 25.48 kg/m²     General: Patient is well developed, well-nourished in no acute distress  HEENT: Normocephalic and atraumatic. No scleral icterus. Pupils are equal, round and reactive to light and accomodation. No conjunctival injection. Oropharynx is clear. Neck: Supple. No evidence of thyroid enlargements or lymphadenopathy. JVD: Cannot be appreciated   Lungs: Breath sounds are equal and clear bilaterally. No wheezes, rhonchi, or rales. Heart: VAD hum  Abdomen: Soft, no mass or tenderness. No organomegaly or hernia. Bowel sounds present. Genitourinary and rectal: deferred  Extremities: No cyanosis, clubbing, or edema. Neurologic: No focal sensory or motor deficits are noted. Grossly intact. Psychiatric: Awake, alert an doriented x 3. Appropriate mood and affect. Skin: Warm, dry and well perfused. No lesions, nodules or rashes are noted. REVIEW OF SYSTEMS:  General: Denies fever, night sweats.   Ear, nose and throat: Denies difficulty hearing, sinus problems, runny nose, post-nasal drip, ringing in ears, mouth sores, loose teeth, ear pain, nosebleeds, sore throate, facial pain or numbess  Cardiovascular: see above in the interval history  Respiratory: Denies cough, wheezing, sputum production, hemoptysis. Gastrointestinal: No ABM  Kidney and bladder: Denies painful urination, frequent urination, urgency, prostate problems and impotence  Musculoskeletal: Denies joint pain, muscle weakness  Skin and hair: Denies change in existing skin lesions, hair loss or increase, breast changes    PAST MEDICAL HISTORY:  Past Medical History:   Diagnosis Date    CAD (coronary artery disease) '90 '97, '13 x 2    [place] code [place] in [place] [facility]    Calculus of kidney     Colonic polyps     Congestive heart failure, unspecified     Diabetes ([facility] [address]     Gastritis     Hypercholesterolemia     Sleep apnea        PAST SURGICAL HISTORY:  Past Surgical History:   Procedure Laterality Date    COLONOSCOPY  04/06/2011    16, due 21    COLONOSCOPY N/A 3/2/2022    COLONOSCOPY    :- performed by Giovanna Duarte MD at [facility] ENDOSCOPY    ENDOSCOPY, COLON, DIAGNOSTIC      11, due 16    HX CORONARY ARTERY BYPASS GRAFT  8/22/12    x 4 vessel by MISSY LION    HX PACEMAKER PLACEMENT  1/30/13    VT CARDIAC SURG PROCEDURE UNLIST  2012    x 4 vessels       FAMILY HISTORY:  Family History   Problem Relation Age of Onset    Heart Disease Mother         MI    Heart Disease Father         CAD & PVD    Lung Disease Father     Cancer Father         lung    Diabetes Maternal Grandmother     Heart Disease Other         CAD    Stroke Sister        SOCIAL HISTORY:  Social History     Socioeconomic History    Marital status:    Tobacco Use    Smoking status: Never Smoker    Smokeless tobacco: Never Used   Vaping Use    Vaping Use: Never used   Substance and Sexual Activity    Alcohol use:  Yes     Alcohol/week: 0.0 standard drinks     Comment:  VERY RARE    Drug use: No       LABORATORY RESULTS:     Labs Latest Ref Rng & Units 4/10/2022 4/9/2022 4/8/2022 4/8/2022 4/7/2022 4/7/2022 4/6/2022   WBC 4.1 - 11.1 K/uL 7.9 7.7 - 8.6 - 8.7 5.9   RBC 4.10 - 5.70 M/uL 3.31(L) 3.16(L) - 2.91(L) - 2.77(L) 3.22(L)   Hemoglobin 12.1 - 17.0 g/dL 8.6(L) 8. 3(L) 8.5(L) 7. 5(L) 7. 7(L) 7. 0(L) 8.0(L)   Hematocrit 36.6 - 50.3 % 26. 1(L) 24. 9(L) 25. 7(L) 23. 0(L) 23. 5(L) 22. 1(L) 25. 0(L)   MCV 80.0 - 99.0 FL 78. 9(L) 78. 8(L) - 79. 0(L) - 79. 8(L) 77. 6(L)   Platelets 583 - 714 K/uL 125(L) 104(L) - 99(L) - 103(L) 149(L)   Lymphocytes 12 - 49 % - - - - - - -   Monocytes 5 - 13 % - - - - - - -   Eosinophils 0 - 7 % - - - - - - -   Basophils 0 - 1 % - - - - - - -   Bands 0 - 6 % - - - - - - -   Albumin 3.5 - 5.0 g/dL 3. 1(L) 3. 1(L) - 3. 0(L) 3.4(L) 3.5 3. 3(L)   Calcium 8.5 - 10.1 MG/DL 8. 1(L) 8. 3(L) - 8. 3(L) 8. 3(L) 8.5 8.5   Glucose 65 - 100 mg/dL 135(H) 107(H) - 91 206(H) 145(H) 115(H)   BUN 6 - 20 MG/DL 29(H) 30(H) - 25(H) 22(H) 20 14   Creatinine 0.70 - 1.30 MG/DL 0.92 0.87 - 0.91 1.13 1.11 0.94   Sodium 136 - 145 mmol/L 132(L) 132(L) - 133(L) 134(L) 134(L) 137   Potassium 3.5 - 5.1 mmol/L 3.8 3.8 - 4.0 3.8 4.3 4.1   TSH 0.36 - 3.74 uIU/mL 2.26 - - - - - -   LDH 85 - 241 U/L 319(H) 289(H) - 306(H) - 329(H) 308(H)   Some recent data might be hidden     Lab Results   Component Value Date/Time    TSH 2.26 04/10/2022 03:00 AM    TSH 1.68 03/01/2022 11:50 AM    TSH 1.47 05/26/2021 10:44 PM    TSH 1.97 05/20/2021 04:28 PM    TSH 1.41 02/09/2021 03:05 PM    TSH 1.740 08/14/2018 09:58 AM    TSH 1.710 10/18/2017 12:00 AM       ALLERGY:  Allergies   Allergen Reactions    Oxycodone Anaphylaxis    Pcn [Penicillins] Hives        CURRENT MEDICATIONS:    Current Facility-Administered Medications:     amiodarone (CORDARONE) tablet 400 mg, 400 mg, Oral, BID, Stefania Ayala MD, 400 mg at 04/10/22 6362    hydrALAZINE (APRESOLINE) tablet 75 mg, 75 mg, Oral, QID, Stefania Ayala MD, 75 mg at 04/10/22 0800    potassium chloride SR (KLOR-CON 10) tablet 20 mEq, 20 mEq, Oral, DAILY, Stefania Ayala MD, 20 mEq at 04/10/22 2449    famotidine (PEPCID) tablet 20 mg, 20 mg, Oral, Q12H, Ximena Real MD, 20 mg at 04/10/22 0958    0.9% sodium chloride infusion 250 mL, 250 mL, IntraVENous, PRN, Terry Danica B, NP    bumetanide (BUMEX) injection 1 mg, 1 mg, IntraVENous, Q6H PRN, Ximena Real MD, 1 mg at 04/10/22 0317    mirtazapine (REMERON) tablet 7.5 mg, 7.5 mg, Oral, QHS, Terry, Danica B, NP, 7.5 mg at 22 2128    gabapentin (NEURONTIN) capsule 100 mg, 100 mg, Oral, BID, Terry, Danica B, NP, 100 mg at 04/10/22 0901    lidocaine 4 % patch 1 Patch, 1 Patch, TransDERmal, Q24H, Terry, Danica B, NP, 1 Patch at 22 1415    Warfarin NP/MD dosing, , Other, PRN, Terry, Danica B, NP    melatonin tablet 3 mg, 3 mg, Oral, QHS PRN, Sravan Alex MD, 3 mg at 04/10/22 0251    0.9% sodium chloride infusion 250 mL, 250 mL, IntraVENous, PRN, Ximena Real MD    0.9% sodium chloride infusion 250 mL, 250 mL, IntraVENous, PRN, Ximena Real MD    hydrALAZINE (APRESOLINE) 20 mg/mL injection 10 mg, 10 mg, IntraVENous, Q6H PRN, Raza JACOBSON, NP, 10 mg at 04/10/22 0245    aspirin chewable tablet 81 mg, 81 mg, Oral, DAILY, Ole Jaydon B, NP, 81 mg at 04/10/22 0958    hydrALAZINE (APRESOLINE) 20 mg/mL injection 5 mg, 5 mg, IntraVENous, Q6H PRN, Raza Interiano B, NP, 5 mg at 22    fentaNYL (DURAGESIC) 12 mcg/hr patch 1 Patch, 1 Patch, TransDERmal, Q72H, Raza JAOCBSON, NP, 1 Patch at 22 2233    sodium chloride (NS) flush 5-40 mL, 5-40 mL, IntraVENous, Q8H, Millie Wong, NP, 10 mL at 04/10/22 5822    sodium chloride (NS) flush 5-40 mL, 5-40 mL, IntraVENous, PRN, Larissa Wong NP    0.9% sodium chloride infusion, 9 mL/hr, IntraVENous, CONTINUOUS, Tracy Wong, NP, Last Rate: 6 mL/hr at 04/10/22 0324, 6 mL/hr at 04/10/22 0324    acetaminophen (TYLENOL) tablet 650 mg, 650 mg, Oral, Q6H PRN, Millie Wong, NP, 650 mg at 04/10/22 0900    naloxone [facility]) injection 0.4 mg, 0.4 mg, IntraVENous, PRN, Pernell Wong NP    ondansetron (ZOFRAN) injection 4 mg, 4 mg, IntraVENous, Q4H PRN, Diana Wong NP, 4 mg at 04/08/22 0946    albuterol-ipratropium (DUO-NEB) 2.5 MG-0.5 MG/3 ML, 3 mL, Nebulization, Q6H PRN, Pernell Wong NP    midazolam (VERSED) injection 1 mg, 1 mg, IntraVENous, Q1H PRN, Pernell Wong NP    senna-docusate (PERICOLACE) 8.6-50 mg per tablet 1 Tablet, 1 Tablet, Oral, DAILY, Pernell Wong NP, 1 Tablet at 04/10/22 6399    polyethylene glycol (MIRALAX) packet 17 g, 17 g, Oral, DAILY, Diana Wong NP, 17 g at 04/10/22 0958    ELECTROLYTE REPLACEMENT NOTE: Nurse to review Serum Potassium and Magnesuim levels and Initiate Electrolyte Replacement Protocol as needed, 1 Each, Other, PRN, Pernell Wong NP    insulin regular (NOVOLIN R, HUMULIN R) 100 Units in 0.9% sodium chloride 100 mL infusion, 0-50 Units/hr, IntraVENous, TITRATE, Pernell Wong NP, Stopped at 04/09/22 1619    glucose chewable tablet 16 g, 4 Tablet, Oral, PRN, Pernell Wong NP    glucagon (GLUCAGEN) injection 1 mg, 1 mg, IntraMUSCular, PRN, Pernell Wong NP    insulin lispro (HUMALOG) injection, , SubCUTAneous, TIDAC, Pernell Wong NP, 4 Units at 04/09/22 1359    insulin lispro (HUMALOG) injection, , SubCUTAneous, AC&HS, Pernell Wong NP, 3 Units at 04/10/22 1144    dextrose 10 % infusion 0-250 mL, 0-250 mL, IntraVENous, PRN, Pernell Wong NP, Last Rate: 999 mL/hr at 04/07/22 2145, 45 mL at 04/07/22 2145    sucralfate (CARAFATE) tablet 1 g, 1 g, Oral, AC&HS, Marvin, Diana MONTERO, NP, 1 g at 04/10/22 1144    0.45% sodium chloride infusion, 10 mL/hr, IntraVENous, CONTINUOUS, Taya Najera MD, Last Rate: 10 mL/hr at 04/10/22 0324, 10 mL/hr at 04/10/22 0324    niCARdipine (CARDENE) 50 mg in 0.9% sodium chloride 100 mL infusion, 0-15 mg/hr, IntraVENous, TITRATE, Taya Najera MD, Stopped at 04/09/22 1135   heparin 25,000 units in  ml infusion, 400 Units/hr, IntraVENous, CONTINUOUS, Alexandre Wong NP, Last Rate: 4 mL/hr at 04/06/22 0013, 400 Units/hr at 04/06/22 0013    heparin 25,000 units in  ml infusion, 12-25 Units/kg/hr, IntraVENous, TITRATE, Alexandre Wong NP, Last Rate: 15.6 mL/hr at 04/10/22 0759, 20 Units/kg/hr at 04/10/22 0759    propofol (DIPRIVAN) 10 mg/mL infusion, 0-50 mcg/kg/min, IntraVENous, TITRATE, Alexandre Wong NP, Stopped at 04/06/22 1228    fentaNYL citrate (PF) injection 25 mcg, 25 mcg, IntraVENous, Q2H PRN, Alexandre Wong NP, 25 mcg at 04/10/22 0954    DOBUTamine (DOBUTREX) 1,000 mg/250 mL (4,000 mcg/mL) infusion, 0-10 mcg/kg/min, IntraVENous, TITRATE, Alexandre Wong NP, Last Rate: 6.8 mL/hr at 04/10/22 0800, 6 mcg/kg/min at 04/10/22 0800    dexmedeTOMidine in 0.9 % NaCl (PRECEDEX) 400 mcg/100 mL (4 mcg/mL) infusion soln, 0.1-1.5 mcg/kg/hr, IntraVENous, TITRATE, Alexandre Wong NP, Stopped at 04/06/22 1351    EPINEPHrine (ADRENALIN) 10 mg in 0.9% sodium chloride 250 mL infusion, 0-10 mcg/min, IntraVENous, TITRATE, Alexandre Wong NP, Stopped at 04/08/22 1758    NOREPINephrine (LEVOPHED) 32,000 mcg in dextrose 5% 250 mL (128 mcg/mL) infusion, 0.5-16 mcg/min, IntraVENous, TITRATE, Alexandre Wong NP, Stopped at 04/05/22 1426    PHENYLephrine (MAINOR-SYNEPHRINE) 100 mg in 0.9% sodium chloride 250 mL infusion,  mcg/min, IntraVENous, TITRATE, Alexandre Wong NP    vasopressin (VASOSTRICT) 20 Units in 0.9% sodium chloride 100 mL infusion, 0-0.04 Units/min, IntraVENous, TITRATE, Alexandre Wong NP, Stopped at 04/05/22 1353    PATIENT CARE TEAM:  Patient Care Team:  Frank Erickson MD as PCP - General (Internal Medicine)  Frank Erickson MD as PCP - [address] [place] [address] Provider  Cierra Delaney MD as Physician (Cardiology)  Juventino Dukes MD as Physician (Gastroenterology)  Astrid Bennett MD as Physician (Orthopedic Surgery)  Corrie Cheng MD as Physician (Ophthalmology)  Danika Parker MD (Cardiology)  Aydee Guan MD (Cardiology)     Thank you for allowing me to participate in this patient's care.     Leyda Nino MD PhD  [address] [place] [address], [address]  Phone: [phone]  Fax: [phone]

## 2022-04-11 ENCOUNTER — APPOINTMENT (OUTPATIENT)
Dept: GENERAL RADIOLOGY | Age: 77
DRG: 001 | End: 2022-04-11
Attending: NURSE PRACTITIONER
Payer: MEDICARE

## 2022-04-11 LAB
ALBUMIN SERPL-MCNC: 2.6 G/DL (ref 3.5–5)
ALBUMIN/GLOB SERPL: 0.8 {RATIO} (ref 1.1–2.2)
ALP SERPL-CCNC: 115 U/L (ref 45–117)
ALT SERPL-CCNC: 62 U/L (ref 12–78)
ANION GAP SERPL CALC-SCNC: 8 MMOL/L (ref 5–15)
APTT PPP: 59.9 SEC (ref 22.1–31)
APTT PPP: 65.5 SEC (ref 22.1–31)
AST SERPL-CCNC: 162 U/L (ref 15–37)
ATRIAL RATE: 59 BPM
BILIRUB SERPL-MCNC: 1.4 MG/DL (ref 0.2–1)
BNP SERPL-MCNC: 4972 PG/ML
BUN SERPL-MCNC: 22 MG/DL (ref 6–20)
BUN/CREAT SERPL: 28 (ref 12–20)
CALCIUM SERPL-MCNC: 8.2 MG/DL (ref 8.5–10.1)
CALCULATED R AXIS, ECG10: -174 DEGREES
CALCULATED T AXIS, ECG11: 158 DEGREES
CHLORIDE SERPL-SCNC: 103 MMOL/L (ref 97–108)
CO2 SERPL-SCNC: 22 MMOL/L (ref 21–32)
CREAT SERPL-MCNC: 0.79 MG/DL (ref 0.7–1.3)
DIAGNOSIS, 93000: NORMAL
ERYTHROCYTE [DISTWIDTH] IN BLOOD BY AUTOMATED COUNT: 23.4 % (ref 11.5–14.5)
GLOBULIN SER CALC-MCNC: 3.3 G/DL (ref 2–4)
GLUCOSE BLD STRIP.AUTO-MCNC: 169 MG/DL (ref 65–117)
GLUCOSE BLD STRIP.AUTO-MCNC: 181 MG/DL (ref 65–117)
GLUCOSE BLD STRIP.AUTO-MCNC: 191 MG/DL (ref 65–117)
GLUCOSE BLD STRIP.AUTO-MCNC: 196 MG/DL (ref 65–117)
GLUCOSE SERPL-MCNC: 136 MG/DL (ref 65–100)
HCT VFR BLD AUTO: 27.2 % (ref 36.6–50.3)
HGB BLD-MCNC: 8.8 G/DL (ref 12.1–17)
INR PPP: 1.3 (ref 0.9–1.1)
LACTATE SERPL-SCNC: 1.3 MMOL/L (ref 0.4–2)
LDH SERPL L TO P-CCNC: 276 U/L (ref 85–241)
MAGNESIUM SERPL-MCNC: 2.1 MG/DL (ref 1.6–2.4)
MCH RBC QN AUTO: 26 PG (ref 26–34)
MCHC RBC AUTO-ENTMCNC: 32.4 G/DL (ref 30–36.5)
MCV RBC AUTO: 80.2 FL (ref 80–99)
NRBC # BLD: 0.03 K/UL (ref 0–0.01)
NRBC BLD-RTO: 0.4 PER 100 WBC
PLATELET # BLD AUTO: 144 K/UL (ref 150–400)
PMV BLD AUTO: 10.8 FL (ref 8.9–12.9)
POTASSIUM SERPL-SCNC: 4.1 MMOL/L (ref 3.5–5.1)
PROT SERPL-MCNC: 5.9 G/DL (ref 6.4–8.2)
PROTHROMBIN TIME: 13 SEC (ref 9–11.1)
Q-T INTERVAL, ECG07: 582 MS
QRS DURATION, ECG06: 180 MS
QTC CALCULATION (BEZET), ECG08: 649 MS
RBC # BLD AUTO: 3.39 M/UL (ref 4.1–5.7)
SERVICE CMNT-IMP: ABNORMAL
SODIUM SERPL-SCNC: 133 MMOL/L (ref 136–145)
THERAPEUTIC RANGE,PTTT: ABNORMAL SECS (ref 58–77)
THERAPEUTIC RANGE,PTTT: ABNORMAL SECS (ref 58–77)
VENTRICULAR RATE, ECG03: 75 BPM
WBC # BLD AUTO: 8.2 K/UL (ref 4.1–11.1)

## 2022-04-11 PROCEDURE — 82962 GLUCOSE BLOOD TEST: CPT

## 2022-04-11 PROCEDURE — 74011000250 HC RX REV CODE- 250: Performed by: NURSE PRACTITIONER

## 2022-04-11 PROCEDURE — 83735 ASSAY OF MAGNESIUM: CPT

## 2022-04-11 PROCEDURE — 97530 THERAPEUTIC ACTIVITIES: CPT

## 2022-04-11 PROCEDURE — 83605 ASSAY OF LACTIC ACID: CPT

## 2022-04-11 PROCEDURE — 85610 PROTHROMBIN TIME: CPT

## 2022-04-11 PROCEDURE — 71045 X-RAY EXAM CHEST 1 VIEW: CPT

## 2022-04-11 PROCEDURE — 99233 SBSQ HOSP IP/OBS HIGH 50: CPT | Performed by: NURSE PRACTITIONER

## 2022-04-11 PROCEDURE — 74011250637 HC RX REV CODE- 250/637: Performed by: NURSE PRACTITIONER

## 2022-04-11 PROCEDURE — 93750 INTERROGATION VAD IN PERSON: CPT | Performed by: NURSE PRACTITIONER

## 2022-04-11 PROCEDURE — 74011000250 HC RX REV CODE- 250: Performed by: INTERNAL MEDICINE

## 2022-04-11 PROCEDURE — 83880 ASSAY OF NATRIURETIC PEPTIDE: CPT

## 2022-04-11 PROCEDURE — 80053 COMPREHEN METABOLIC PANEL: CPT

## 2022-04-11 PROCEDURE — 85027 COMPLETE CBC AUTOMATED: CPT

## 2022-04-11 PROCEDURE — 99231 SBSQ HOSP IP/OBS SF/LOW 25: CPT | Performed by: CLINICAL NURSE SPECIALIST

## 2022-04-11 PROCEDURE — 93750 INTERROGATION VAD IN PERSON: CPT | Performed by: THORACIC SURGERY (CARDIOTHORACIC VASCULAR SURGERY)

## 2022-04-11 PROCEDURE — P9047 ALBUMIN (HUMAN), 25%, 50ML: HCPCS | Performed by: NURSE PRACTITIONER

## 2022-04-11 PROCEDURE — 36415 COLL VENOUS BLD VENIPUNCTURE: CPT

## 2022-04-11 PROCEDURE — 74011250636 HC RX REV CODE- 250/636: Performed by: NURSE PRACTITIONER

## 2022-04-11 PROCEDURE — 85730 THROMBOPLASTIN TIME PARTIAL: CPT

## 2022-04-11 PROCEDURE — 97535 SELF CARE MNGMENT TRAINING: CPT

## 2022-04-11 PROCEDURE — 65610000003 HC RM ICU SURGICAL

## 2022-04-11 PROCEDURE — 86900 BLOOD TYPING SEROLOGIC ABO: CPT

## 2022-04-11 PROCEDURE — 97116 GAIT TRAINING THERAPY: CPT

## 2022-04-11 PROCEDURE — 99291 CRITICAL CARE FIRST HOUR: CPT | Performed by: THORACIC SURGERY (CARDIOTHORACIC VASCULAR SURGERY)

## 2022-04-11 PROCEDURE — 83615 LACTATE (LD) (LDH) ENZYME: CPT

## 2022-04-11 PROCEDURE — 74011250637 HC RX REV CODE- 250/637: Performed by: INTERNAL MEDICINE

## 2022-04-11 RX ORDER — ALBUMIN HUMAN 250 G/1000ML
12.5 SOLUTION INTRAVENOUS 2 TIMES DAILY
Status: DISCONTINUED | OUTPATIENT
Start: 2022-04-11 | End: 2022-05-05

## 2022-04-11 RX ORDER — BUMETANIDE 0.25 MG/ML
1 INJECTION INTRAMUSCULAR; INTRAVENOUS 2 TIMES DAILY
Status: DISCONTINUED | OUTPATIENT
Start: 2022-04-11 | End: 2022-04-12

## 2022-04-11 RX ORDER — AMLODIPINE BESYLATE 5 MG/1
5 TABLET ORAL 2 TIMES DAILY
Status: DISCONTINUED | OUTPATIENT
Start: 2022-04-11 | End: 2022-04-14

## 2022-04-11 RX ORDER — AMLODIPINE BESYLATE 5 MG/1
5 TABLET ORAL ONCE
Status: COMPLETED | OUTPATIENT
Start: 2022-04-11 | End: 2022-04-11

## 2022-04-11 RX ORDER — WARFARIN SODIUM 5 MG/1
5 TABLET ORAL ONCE
Status: COMPLETED | OUTPATIENT
Start: 2022-04-11 | End: 2022-04-11

## 2022-04-11 RX ADMIN — FENTANYL CITRATE 25 MCG: 0.05 INJECTION, SOLUTION INTRAMUSCULAR; INTRAVENOUS at 14:24

## 2022-04-11 RX ADMIN — BUMETANIDE 1 MG: 0.25 INJECTION, SOLUTION INTRAMUSCULAR; INTRAVENOUS at 12:16

## 2022-04-11 RX ADMIN — HYDRALAZINE HYDROCHLORIDE 5 MG: 20 INJECTION INTRAMUSCULAR; INTRAVENOUS at 02:14

## 2022-04-11 RX ADMIN — BUMETANIDE 1 MG: 0.25 INJECTION, SOLUTION INTRAMUSCULAR; INTRAVENOUS at 17:48

## 2022-04-11 RX ADMIN — ALBUMIN (HUMAN) 12.5 G: 0.25 INJECTION, SOLUTION INTRAVENOUS at 12:56

## 2022-04-11 RX ADMIN — SENNOSIDES AND DOCUSATE SODIUM 1 TABLET: 50; 8.6 TABLET ORAL at 08:16

## 2022-04-11 RX ADMIN — DOBUTAMINE HYDROCHLORIDE 6 MCG/KG/MIN: 400 INJECTION INTRAVENOUS at 06:19

## 2022-04-11 RX ADMIN — POTASSIUM CHLORIDE 20 MEQ: 750 TABLET, EXTENDED RELEASE ORAL at 17:48

## 2022-04-11 RX ADMIN — SUCRALFATE 1 G: 1 TABLET ORAL at 12:09

## 2022-04-11 RX ADMIN — AMIODARONE HYDROCHLORIDE 400 MG: 200 TABLET ORAL at 08:16

## 2022-04-11 RX ADMIN — FENTANYL CITRATE 25 MCG: 0.05 INJECTION, SOLUTION INTRAMUSCULAR; INTRAVENOUS at 08:07

## 2022-04-11 RX ADMIN — ONDANSETRON HYDROCHLORIDE 4 MG: 2 SOLUTION INTRAMUSCULAR; INTRAVENOUS at 18:12

## 2022-04-11 RX ADMIN — AMLODIPINE BESYLATE 5 MG: 5 TABLET ORAL at 17:49

## 2022-04-11 RX ADMIN — FENTANYL CITRATE 25 MCG: 0.05 INJECTION, SOLUTION INTRAMUSCULAR; INTRAVENOUS at 12:59

## 2022-04-11 RX ADMIN — AMLODIPINE BESYLATE 5 MG: 5 TABLET ORAL at 04:25

## 2022-04-11 RX ADMIN — FAMOTIDINE 20 MG: 20 TABLET ORAL at 08:16

## 2022-04-11 RX ADMIN — FENTANYL CITRATE 25 MCG: 0.05 INJECTION, SOLUTION INTRAMUSCULAR; INTRAVENOUS at 20:53

## 2022-04-11 RX ADMIN — SODIUM CHLORIDE, PRESERVATIVE FREE 10 ML: 5 INJECTION INTRAVENOUS at 22:04

## 2022-04-11 RX ADMIN — GABAPENTIN 100 MG: 100 CAPSULE ORAL at 08:16

## 2022-04-11 RX ADMIN — ASPIRIN 81 MG CHEWABLE TABLET 81 MG: 81 TABLET CHEWABLE at 08:16

## 2022-04-11 RX ADMIN — POLYETHYLENE GLYCOL 3350 17 G: 17 POWDER, FOR SOLUTION ORAL at 17:48

## 2022-04-11 RX ADMIN — ONDANSETRON HYDROCHLORIDE 4 MG: 2 SOLUTION INTRAMUSCULAR; INTRAVENOUS at 08:35

## 2022-04-11 RX ADMIN — ONDANSETRON HYDROCHLORIDE 4 MG: 2 SOLUTION INTRAMUSCULAR; INTRAVENOUS at 21:39

## 2022-04-11 RX ADMIN — SODIUM CHLORIDE 9 ML/HR: 9 INJECTION, SOLUTION INTRAVENOUS at 06:16

## 2022-04-11 RX ADMIN — MIRTAZAPINE 7.5 MG: 15 TABLET, FILM COATED ORAL at 21:39

## 2022-04-11 RX ADMIN — Medication 20 UNITS/KG/HR: at 06:20

## 2022-04-11 RX ADMIN — GABAPENTIN 100 MG: 100 CAPSULE ORAL at 17:48

## 2022-04-11 RX ADMIN — SODIUM CHLORIDE, PRESERVATIVE FREE 10 ML: 5 INJECTION INTRAVENOUS at 05:10

## 2022-04-11 RX ADMIN — FENTANYL CITRATE 25 MCG: 0.05 INJECTION, SOLUTION INTRAMUSCULAR; INTRAVENOUS at 10:11

## 2022-04-11 RX ADMIN — POTASSIUM CHLORIDE 20 MEQ: 750 TABLET, EXTENDED RELEASE ORAL at 08:16

## 2022-04-11 RX ADMIN — SUCRALFATE 1 G: 1 TABLET ORAL at 16:49

## 2022-04-11 RX ADMIN — WARFARIN SODIUM 5 MG: 5 TABLET ORAL at 16:49

## 2022-04-11 RX ADMIN — AMIODARONE HYDROCHLORIDE 400 MG: 200 TABLET ORAL at 17:48

## 2022-04-11 RX ADMIN — SODIUM CHLORIDE, PRESERVATIVE FREE 10 ML: 5 INJECTION INTRAVENOUS at 14:20

## 2022-04-11 RX ADMIN — HYDRALAZINE HYDROCHLORIDE 75 MG: 50 TABLET, FILM COATED ORAL at 17:48

## 2022-04-11 RX ADMIN — FAMOTIDINE 20 MG: 20 TABLET ORAL at 21:39

## 2022-04-11 RX ADMIN — HYDRALAZINE HYDROCHLORIDE 75 MG: 50 TABLET, FILM COATED ORAL at 21:39

## 2022-04-11 RX ADMIN — ALBUMIN (HUMAN) 12.5 G: 0.25 INJECTION, SOLUTION INTRAVENOUS at 17:48

## 2022-04-11 RX ADMIN — SUCRALFATE 1 G: 1 TABLET ORAL at 06:43

## 2022-04-11 RX ADMIN — SENNOSIDES AND DOCUSATE SODIUM 1 TABLET: 50; 8.6 TABLET ORAL at 17:49

## 2022-04-11 RX ADMIN — HYDRALAZINE HYDROCHLORIDE 75 MG: 50 TABLET, FILM COATED ORAL at 08:16

## 2022-04-11 RX ADMIN — POLYETHYLENE GLYCOL 3350 17 G: 17 POWDER, FOR SOLUTION ORAL at 08:16

## 2022-04-11 RX ADMIN — SUCRALFATE 1 G: 1 TABLET ORAL at 21:39

## 2022-04-11 RX ADMIN — HYDRALAZINE HYDROCHLORIDE 75 MG: 50 TABLET, FILM COATED ORAL at 12:09

## 2022-04-11 RX ADMIN — Medication 20 UNITS/KG/HR: at 00:06

## 2022-04-11 NOTE — PROGRESS NOTES
[facility] in [place], [place]  Inpatient Progress Note      Patient name: Sonya Woody  Patient : 1945  Patient MRN: [id number]  Consulting MD: Mj Guevara MD  Date of service: 22    REASON FOR REFERRAL:  Management of LVAD     PLAN OF CARE:   · 70 y/o with severe ischemic cardiomyopathy, LVEF 15-20%; stage D, NYHA class IV symptoms; s/p LVAD implant with HM3 as DT due to age (22)  · Postoperative course:  ? Bleeding intra-op, required cryo, k-centra, FFP, Plt, and cellsaver in OR; resolved  ? RV dysfunction requiring prolonged inotropic support with dobutamine  ?  Routine postoperative care      RECOMMENDATIONS:   POD#6 from LVAD Implant  Continue speed 4800rpms, low speed 4400  Continue dobutamine 6 mcg/kg/min, keep this dose and no plans to wean   Continue hydralazine to 75mg PO QID  Continue amiodarone to 400mg PO twice daily  Continue potassium to 20meq twice daily  Begin amlodipine 5 mg PO BID   Begin Bumex 1 mg IV BID + 25% albumin BID to augment diuresis   Continue Bumex 1mg IV as needed for CVP > 12 (goal 12-14) to prevent LV suckdown  Cannot tolerate ARB/ARNi until Cr stable  Heparin gtt per protocol  Continue warfarin 5 mg, goal INR 2-3   ASA 81mg daily  Continue gabapentin 100mg BID  Continue Toradol q 6 for 24 hours- watch renal function  Continue fentanyl and Lidocaine patch  D/w pharmacy alternative pain management options (anaphylaxis with oxycodone)   Ferritin > 700- no venofer- repeat next week   Driveline dressing changes daily for now until 5/3/22  Continue bowel regimen; miralax twice daily, pericolace twice daily add dulcolax 5mg PO  Low dose Remeron  Pulmonary toilet, monitor CT output   SSI   CPAP QHS  Advanced care plan forms on file   Strict I/O, daily weights, Na+ restricted diet   Continue VAD education      IMPRESSION:  S/p LVAD implant as DT on 22 with Dr. Jh Montalvo   chronic systolic heart failure  · Stage D, NYHA class IV symptoms improved to class II on inotropes  · Non-ischemic cardiomyopathy, LVEF 20% with LVEDD 6.2  · RV dysfunction, TAPSE 1.22 (prelimnary read)  · TBili 1.5 likely from cardiac congestion  At risk of sudden cardiac death  · Recent cardiac arrest   · S/p ICD (1/2013, [employer] followed by [employer] [facility]); New implant on 10/21/2021 [employer] [place] [employer]  Coronary artery disease  · S/p multiple interventions  · S/p 4V CABG (8/2012)  · LHC (7/2019) severe stenosis of LIMA to LAD anastomosis site, SVG graft to OM is occluded, SVG graft to RCA 40-50%, LAD occluded proximally, severe proximal LCx, RCA is the long . · PET (6/2019) EF 24% with anterior lateral and inferior reversible defect  Cardiac risk factors  · HTN  · HL  · DM2  · SRUTHI on CPAP  · MIld carotid stenosis  Anemia, microcytic  · Iron deficiency  DVT with filter  Pulmonary nodules   Dysphagia      Interval Events:  No issues overnight  Pain well controlled  MAPs at goal  CVP around 13  Dobutamine 6  CBC stable   BMP stable  CXR clear ?skin fold     CARDIAC IMAGING:  Echo (4/8/22)    Left Ventricle: Left ventricle size is normal. Moderately increased wall thickness. Findings consistent with concentric remodeling. Severe global hypokinesis present. Severely reduced left ventricular systolic function with a visually estimated EF of 10 -15%. 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   Aortic Valve: Moderate sclerosis of the aortic valve cusp.   Mitral Valve: Moderately thickened leaflet. Mild transvalvular regurgitation.   Tricuspid Valve: Severe transvalvular regurgitation.   Right Atrium: Right atrium is severely dilated.     Echo (3/2/22)   · LV 10-15%  · Mod-severe MR      Echo (11/23/21):  · LV 15-20%. · Mod-severe, MR, mod-TR     Echo (5/20/21)  · LV: Estimated LVEF is 20 - 25%. Normal wall thickness. Mildly dilated left ventricle.  Severely and globally reduced systolic function. Severe (grade 4) left ventricular diastolic dysfunction. · LA: Severely dilated left atrium. · RA: Severely dilated right atrium. · MV: Moderate mitral valve regurgitation is present. · TV: Moderate tricuspid valve regurgitation is present. · AO: Mild aortic root dilatation. · RV: Pacer/ICD present. · LVED 6.2cm     EKG (5/20/21) SB with 1degree AV block, QRS 116ms     LHC (5/24/21) Native Coronaries: LM - moderate to severe distal disease 50%. % prox occluded (), heavily calcified, LCx: 80% proximal stenosis. OM1 is  (seen filling faintly via collaterals). OM2 99% stenosis. RCA: 100% proximal occluded.  LIMA to LAD: patent, supplies only a diagonal branch (probably D2). The LAD distal to the bifurcation is 100% occluded () and fills via right to left collaterals off the SVG to RCA. SVG to R-PDA: patent with moderate diffuse irregularities but no obstructive disease in the graft.    No appealing interventional targets.      NST as above     ICD Interrogation (11/12/2021) [email or web address] VVI, thoracic impedence trending up, no events, normal device function and good battery  ICD interrogation (5/12/21) [employer] single lead, no events, normal device function and good battery     HEMODYNAMICS:  RHC 5/24/21 CI 1.97, PA 33/9/17, RA 1, PCWP 6- off milrinone   CPEST not done     Patient underwent a 6 minute walk test 11/12/2021     6 Min Walk Report 11/12/2021 7/6/2021 5/20/2021   (PRE) HR 88 98 74   (PRE) O2 Sat 100 - 99   (POST)  - 81   (POST) O2 Sat 99 98% on Ra 99   Distance in Meters 386.58 - 1158. 24       OTHER IMAGING:  CXR (6/17/21) clear  CT 5/21/21 1. Irregular pulmonary nodule in the left upper lobe measuring 2 cm, suspicious for primary pulmonary malignancy. 2. Additional 6 mm left upper lobe pulmonary nodule. 3. Severe calcific atherosclerosis of the coronary arteries and abdominal aorta. No aneurysm. 4. Cardiomegaly.  5. No acute abnormality within the chest, abdomen, or pelvis.      HISTORY OF PRESENT ILLNESS:  Briefly, Beverly Colindres is a 69 y.o. male with h/o HTN, HL, DM2, SRUTHI on CPAP, chronic systolic heart failure, stage D, NYHA class IV symptoms, non-ischemic cardiomyopathy, LVEF 20% with LVEDD 6.2, RV dysfunciton, TAPSE 1.22, TBili 1.5 likely from cardiac congestion, recent cardiac arrest s/p ICD (1/2013, [employer] followed by [facility]), coronary artery disease s/p multiple interventions s/p 4V CABG (8/2012), [facility] (7/2019) severe stenosis of LIMA to LAD anastomosis site, SVG graft to OM is occluded, SVG graft to RCA 40-50%, LAD occluded proximally, severe proximal LCx, RCA is the long . PET (6/2019) EF 24% with anterior lateral and inferior reversible defect. Anemia, microcytic with iron deficiency, DVT with filter.     Patient was referred to [facility] by Dr. Kaylee Lin for evaluation of his candidacy for advanced therapies.     Patient agreed to inpatient initiation of palliative infusion of chronic inotropes and evaluation for LVAD-DT. Patient was admitted 5/2-5/25/21. Patient was started on milrinone infusion and had first part of LVAD evaluation completed. Right and left heart cath on 5/24/21 that showed severe diffuse native vessel CAD, with patent grafts (LIMA to D2, SVG to RCA).  Antiplatelet therapy was held during hospitalization and after discharge due to anticipated pulmonary nodule biopsy, bone marrow biopsy and EGD/C-Scope as part of LVAD workup.     Patient was readmitted 5/26-5/28/21 with hypertension, headache and chest pain, his troponin peaked at 10; he was shortly bridged with heparin, otherwise had normal EKG and chest CT negative for PE. Cardiology and AHF service was consulted and patient was discharged home after troponin came down.      Patient has now completed radiation treatment for adenocarcinoma of the lung.  Hem-onc has cleared patient for VAD implant with 90% 2 year prognosis.      He was most recently admitted for elective pre-op EGD and colonoscopy which he tolerated well; path negative for malignancy.      He presented to [facility] on 4/3 for LVAD implant. LVAD INTERROGATION:  Device interrogated in person  Device function normal, normal flow, no events  LVAD   Pump Speed (RPM): 4800  Pump Flow (LPM): 2.7  MAP: 78 (doppler)  PI (Pulsitility Index): 10.9  Power: 3.1   Test: Yes  Back Up  at Bedside & Labeled: Yes  Power Module Test: Yes  Driveline Site Care: No  Driveline Dressing: Clean, Dry, and Intact  Testing  Alarms Reviewed: Yes  Back up SC speed: 4800  Back up Low Speed Limit: 4400  Emergency Equipment with Patient?: Yes  Emergency procedures reviewed?: Yes  Drive line site inspected?: No  Drive line intergrity inspected?: Yes  Drive line dressing changed?: No    PHYSICAL EXAM:  Visit Vitals  BP (!) 89/61   Pulse 79   Temp 98.7 °F (37.1 °C)   Resp 18   Ht 6' 1\" (1.854 m)   Wt 188 lb 15 oz (85.7 kg)   SpO2 99%   BMI 24.93 kg/m²     General: Patient is well developed, well-nourished in no acute distress  HEENT: Normocephalic and atraumatic. No scleral icterus. Pupils are equal, round and reactive to light and accomodation. No conjunctival injection. Oropharynx is clear. Neck: Supple. No evidence of thyroid enlargements or lymphadenopathy. JVD: Cannot be appreciated   Lungs: Breath sounds are equal and clear bilaterally. No wheezes, rhonchi, or rales. Heart: VAD hum  Abdomen: Soft, no mass or tenderness. No organomegaly or hernia. Bowel sounds present. Genitourinary and rectal: deferred  Extremities: No cyanosis, clubbing. 1+ pitting LE edema. Neurologic: No focal sensory or motor deficits are noted. Grossly intact. Psychiatric: Awake, alert an doriented x 3. Appropriate mood and affect. Skin: Warm, dry and well perfused. No lesions, nodules or rashes are noted. REVIEW OF SYSTEMS:  General: Denies fever, night sweats.   Ear, nose and throat: Denies difficulty hearing, sinus problems, runny nose, post-nasal drip, ringing in ears, mouth sores, loose teeth, ear pain, nosebleeds, sore throate, facial pain or numbess  Cardiovascular: see above in the interval history  Respiratory: Denies cough, wheezing, sputum production, hemoptysis. Gastrointestinal: + nausea, vomiting this AM.   Kidney and bladder: Denies painful urination, frequent urination, urgency, prostate problems and impotence  Musculoskeletal: Denies joint pain, muscle weakness  Skin and hair: Denies change in existing skin lesions, hair loss or increase, breast changes    PAST MEDICAL HISTORY:  Past Medical History:   Diagnosis Date    CAD (coronary artery disease) '90 '97, '13 x 2    [place] [facility]    Calculus of kidney     Colonic polyps     Congestive heart failure, unspecified     Diabetes ([facility] [address]     Gastritis     Hypercholesterolemia     Sleep apnea        PAST SURGICAL HISTORY:  Past Surgical History:   Procedure Laterality Date    COLONOSCOPY  04/06/2011    16, due 21    COLONOSCOPY N/A 3/2/2022    COLONOSCOPY    :- performed by Shazia Monroy MD at [facility] ENDOSCOPY    ENDOSCOPY, COLON, DIAGNOSTIC      11, due 16    HX CORONARY ARTERY BYPASS GRAFT  8/22/12    x 4 vessel by MISSY Ramirez    HX HEENT      LASIK    HX PACEMAKER PLACEMENT  1/30/13    [place] CARDIAC SURG PROCEDURE UNLIST  2012    x 4 vessels       FAMILY HISTORY:  Family History   Problem Relation Age of Onset    Heart Disease Mother         MI    Heart Disease Father         CAD & PVD    Lung Disease Father     Cancer Father         lung    Diabetes Maternal Grandmother     Heart Disease Other         CAD    Stroke Sister        SOCIAL HISTORY:  Social History     Socioeconomic History    Marital status:    Tobacco Use    Smoking status: Never Smoker    Smokeless tobacco: Never Used   Vaping Use    Vaping Use: Never used   Substance and Sexual Activity    Alcohol use:  Yes     Alcohol/week: 0.0 standard drinks     Comment:  VERY RARE  Drug use: No       LABORATORY RESULTS:     Labs Latest Ref Rng & Units 4/11/2022 4/10/2022 4/9/2022 4/8/2022 4/8/2022 4/7/2022 4/7/2022   WBC 4.1 - 11.1 K/uL 8.2 7.9 7.7 - 8.6 - 8.7   RBC 4.10 - 5.70 M/uL 3.39(L) 3.31(L) 3.16(L) - 2.91(L) - 2.77(L)   Hemoglobin 12.1 - 17.0 g/dL 8.8(L) 8.6(L) 8. 3(L) 8.5(L) 7. 5(L) 7. 7(L) 7. 0(L)   Hematocrit 36.6 - 50.3 % 27. 2(L) 26. 1(L) 24. 9(L) 25. 7(L) 23. 0(L) 23. 5(L) 22. 1(L)   MCV 80.0 - 99.0 FL 80.2 78. 9(L) 78. 8(L) - 79. 0(L) - 79. 8(L)   Platelets 714 - 328 K/uL 144(L) 125(L) 104(L) - 99(L) - 103(L)   Lymphocytes 12 - 49 % - - - - - - -   Monocytes 5 - 13 % - - - - - - -   Eosinophils 0 - 7 % - - - - - - -   Basophils 0 - 1 % - - - - - - -   Bands 0 - 6 % - - - - - - -   Albumin 3.5 - 5.0 g/dL 2. 6(L) 3. 1(L) 3. 1(L) - 3. 0(L) 3.4(L) 3.5   Calcium 8.5 - 10.1 MG/DL 8. 2(L) 8. 1(L) 8. 3(L) - 8. 3(L) 8. 3(L) 8.5   Glucose 65 - 100 mg/dL 136(H) 135(H) 107(H) - 91 206(H) 145(H)   BUN 6 - 20 MG/DL 22(H) 29(H) 30(H) - 25(H) 22(H) 20   Creatinine 0.70 - 1.30 MG/DL 0.79 0.92 0.87 - 0.91 1.13 1.11   Sodium 136 - 145 mmol/L 133(L) 132(L) 132(L) - 133(L) 134(L) 134(L)   Potassium 3.5 - 5.1 mmol/L 4.1 3.8 3.8 - 4.0 3.8 4.3   TSH 0.36 - 3.74 uIU/mL - 2.26 - - - - -   LDH 85 - 241 U/L 276(H) 319(H) 289(H) - 306(H) - 329(H)   Some recent data might be hidden     Lab Results   Component Value Date/Time    TSH 2.26 04/10/2022 03:00 AM    TSH 1.68 03/01/2022 11:50 AM    TSH 1.47 05/26/2021 10:44 PM    TSH 1.97 05/20/2021 04:28 PM    TSH 1.41 02/09/2021 03:05 PM    TSH 1.740 08/14/2018 09:58 AM    TSH 1.710 10/18/2017 12:00 AM       ALLERGY:  Allergies   Allergen Reactions    Oxycodone Anaphylaxis    Pcn [Penicillins] Hives        CURRENT MEDICATIONS:    Current Facility-Administered Medications:     bumetanide (BUMEX) injection 1 mg, 1 mg, IntraVENous, BID, Polliard, Ajit Roxana, NP    albumin human 25% (BUMINATE) solution 12.5 g, 12.5 g, IntraVENous, BID, Polliard, Ajitmaty Schmidt, NP    warfarin (COUMADIN) tablet 5 mg, 5 mg, Oral, ONCE, Leonid Wong NP    amLODIPine (NORVASC) tablet 5 mg, 5 mg, Oral, BID, Beni Wogn NP    polyethylene glycol (MIRALAX) packet 17 g, 17 g, Oral, BID, Pamela Conley MD, 17 g at 04/11/22 0816    senna-docusate (PERICOLACE) 8.6-50 mg per tablet 1 Tablet, 1 Tablet, Oral, BID, Pamela Conley MD, 1 Tablet at 04/11/22 0816    potassium chloride SR (KLOR-CON 10) tablet 20 mEq, 20 mEq, Oral, BID, Pamela Conley MD, 20 mEq at 04/11/22 0816    amiodarone (CORDARONE) tablet 400 mg, 400 mg, Oral, BID, Pamela Conley MD, 400 mg at 04/11/22 0816    hydrALAZINE (APRESOLINE) tablet 75 mg, 75 mg, Oral, QID, Pamela Conley MD, 75 mg at 04/11/22 0816    famotidine (PEPCID) tablet 20 mg, 20 mg, Oral, Q12H, Pamela Conley MD, 20 mg at 04/11/22 0816    bumetanide (BUMEX) injection 1 mg, 1 mg, IntraVENous, Q6H PRN, Pamela Conley MD, 1 mg at 04/10/22 1234    mirtazapine (REMERON) tablet 7.5 mg, 7.5 mg, Oral, QHS, Terry, Danica B, NP, 7.5 mg at 04/10/22 2154    gabapentin (NEURONTIN) capsule 100 mg, 100 mg, Oral, BID, Terry Danica B, NP, 100 mg at 04/11/22 0816    lidocaine 4 % patch 1 Patch, 1 Patch, TransDERmal, Q24H, Terry, Danica B, NP, 1 Patch at 04/10/22 1234    Warfarin NP/MD dosing, , Other, PRN, Terry, Danica B, NP    melatonin tablet 3 mg, 3 mg, Oral, QHS PRN, Keanu Allison MD, 3 mg at 04/10/22 0251    hydrALAZINE (APRESOLINE) 20 mg/mL injection 10 mg, 10 mg, IntraVENous, Q6H PRN, Voncille Budds B, NP, 10 mg at 04/10/22 0245    aspirin chewable tablet 81 mg, 81 mg, Oral, DAILY, Voncille Budds B, NP, 81 mg at 04/11/22 0816    hydrALAZINE (APRESOLINE) 20 mg/mL injection 5 mg, 5 mg, IntraVENous, Q6H PRN, Voncille Budds B, NP, 5 mg at 04/11/22 0214    fentaNYL (DURAGESIC) 12 mcg/hr patch 1 Patch, 1 Patch, TransDERmal, Q72H, Voncille Budds B, NP, 1 Patch at 04/10/22 2310    sodium chloride (NS) flush 5-40 mL, 5-40 mL, IntraVENous, Q8H, Mehran Wong NP, 10 mL at 04/11/22 0510    sodium chloride (NS) flush 5-40 mL, 5-40 mL, IntraVENous, PRN, Nena Wong NP    0.9% sodium chloride infusion, 9 mL/hr, IntraVENous, CONTINUOUS, Mehran Wong NP, Last Rate: 6 mL/hr at 04/11/22 0801, 6 mL/hr at 04/11/22 0801    acetaminophen (TYLENOL) tablet 650 mg, 650 mg, Oral, Q6H PRN, Nena Wong, EARNESTINE, 650 mg at 04/10/22 1756    naloxone (NARCAN) injection 0.4 mg, 0.4 mg, IntraVENous, PRN, Nena Wong, NP    ondansetron (ZOFRAN) injection 4 mg, 4 mg, IntraVENous, Q4H PRN, Mehran Wong NP, 4 mg at 04/11/22 0835    albuterol-ipratropium (DUO-NEB) 2.5 MG-0.5 MG/3 ML, 3 mL, Nebulization, Q6H PRN, Nena Wong NP    midazolam (VERSED) injection 1 mg, 1 mg, IntraVENous, Q1H PRN, Nena Wong NP    ELECTROLYTE REPLACEMENT NOTE: Nurse to review Serum Potassium and Magnesuim levels and Initiate Electrolyte Replacement Protocol as needed, 1 Each, Other, PRN, Nena Wong, NP    insulin regular (NOVOLIN R, HUMULIN R) 100 Units in 0.9% sodium chloride 100 mL infusion, 0-50 Units/hr, IntraVENous, TITRATE, Nena Wong NP, Stopped at 04/09/22 1619    glucose chewable tablet 16 g, 4 Tablet, Oral, PRN, Nena Wong, NP    glucagon (GLUCAGEN) injection 1 mg, 1 mg, IntraMUSCular, PRN, Nena Wong, NP    insulin lispro (HUMALOG) injection, , SubCUTAneous, AC&HS, Mehran Wong NP, 3 Units at 04/10/22 1144    dextrose 10 % infusion 0-250 mL, 0-250 mL, IntraVENous, PRN, Marvin, Nena Olivarez, NP, Last Rate: 999 mL/hr at 04/07/22 2145, 45 mL at 04/07/22 2145    sucralfate (CARAFATE) tablet 1 g, 1 g, Oral, AC&HS, Marvin, Mehran Gamez T, NP, 1 g at 04/11/22 0643    0.45% sodium chloride infusion, 10 mL/hr, IntraVENous, CONTINUOUS, Kishore Najera MD, Stopped at 04/10/22 0730    heparin 25,000 units in  ml infusion, 400 Units/hr, IntraVENous, CONTINUOUS, Nena Wong, NP, Last Rate: 4 mL/hr at 04/06/22 0013, 400 Units/hr at 04/06/22 0013    heparin 25,000 units in  ml infusion, 12-25 Units/kg/hr, IntraVENous, TITRATE, Pat Wong NP, Last Rate: 15.6 mL/hr at 04/11/22 0802, 20 Units/kg/hr at 04/11/22 0802    fentaNYL citrate (PF) injection 25 mcg, 25 mcg, IntraVENous, Q2H PRN, Pat Wong NP, 25 mcg at 04/11/22 1011    DOBUTamine (DOBUTREX) 1,000 mg/250 mL (4,000 mcg/mL) infusion, 0-10 mcg/kg/min, IntraVENous, TITRATE, Pat Wong NP, Last Rate: 6.8 mL/hr at 04/11/22 1203, 6 mcg/kg/min at 04/11/22 1203    PATIENT CARE TEAM:  Patient Care Team:  Orlin Sandhu MD as PCP - General (Internal Medicine)  Orlin Sandhu MD as PCP - [facility]  Fredo Schulte MD as Physician (Cardiology)  Kristopher Leon MD as Physician (Gastroenterology)  Elliot Hwang MD as Physician (Orthopedic Surgery)  Karina Mcleod MD as Physician (Ophthalmology)  Quique Johnson MD (Cardiology)  Judy García MD (Cardiology)     Thank you for allowing me to participate in this patient's care.     Merle Roy NP   [address] [place] [address], [address]  Phone: [phone]

## 2022-04-11 NOTE — PROGRESS NOTES
[address] [facility] in [place], [place]     Attempted to meet with patient for LVAD education. Patient found to be sleeping. Will check in tomorrow morning.         Charito Evans RN  VAD Coordinator

## 2022-04-11 NOTE — PROGRESS NOTES
0000 - Bedside and Verbal shift change report given to KIRK Suh (oncoming nurse) by Braulio Garg RN (offgoing nurse). Report included the following information SBAR, Kardex, OR Summary, Procedure Summary, Intake/Output, MAR, Recent Results and Cardiac Rhythm Afib.     5954 - Patient low flow alarming on LVAD. MAP 90. Dr. Jose Barrett notified. Plan is to give a 5mg dose of Norvasc.

## 2022-04-11 NOTE — DIABETES MGMT
[address] [facility]    CLINICAL NURSE SPECIALIST CONSULT     Initial Presentation   Jarrod Tamez is a 69 y.o. male who was admitted 4/3/22 for medical optimization of severe ischemic cardiomyopathy with heart failure with reduced ejection fraction prior to LVAD implantation. HX:   Past Medical History:   Diagnosis Date    CAD (coronary artery disease) '90 '97, '13 x 2    MI, code ice in 2013 at [facility] [place], [place]    Calculus of kidney     Colonic polyps     Congestive heart failure, unspecified     Diabetes ([facility]     Gastritis     Hypercholesterolemia     Sleep apnea         INITIAL DX:   HFrEF (heart failure with reduced ejection fraction) ([facility] [I50.20]     Current Treatment     TX: Diuretic adjustment, antibiotics, milrinone    Consulted by Aureliano James NP for advanced diabetes nursing assessment and care for:   [x] Inpatient management strategy    Hospital Course   Clinical progress has been uncomplicated. 4/3: Admission  4/5: LVAD implant  4/6: Extubated  4/7: Lactate elevated, TTE- dilated LV cavity, RV with mod dysfunction, Increased LVAD speed. Increased LFTs with hepatic congestion 3  4/8: Blood transfusion   4/9-4/11: progressing post op, HGB stable over weekend s/p PRBC transfusion; PO intake adequate per flow sheets  Diabetes History   Type 2 Diabetes- A1C 6.8%  Ambulatory BG management provided by: Alvenia Apley, MD PCP  Family history positive for diabetes: Maternal Grandmother with DM2       Diabetes-related Medical History  Neurological complications  Peripheral neuropathy  Macrovascular disease  CAD and Myocardial infarction  Other associated conditions     CHF, Sleep Apnea    Diabetes Medication History  Key Antihyperglycemic Medications             metFORMIN (GLUCOPHAGE) 500 mg tablet (Taking) Take 1 Tablet by mouth two (2) times daily (with meals).     dapagliflozin (Farxiga) 10 mg tab tablet (Taking) Take 1 Tablet by mouth daily.            Diabetes self-management practices:   Eating pattern  [x]? Breakfast         Eggs, toast, oatmeal  [x]? Lunch              Sometimes skips or piece of fruit  [x]? Dinner              Chicken/steak with a sweet potato or bowl of soup  [x]? Bedtime           Fruit  [x]? Snacks            Fruit  [x]? Beverages       Water  Physical activity pattern  Limited with HF  Monitoring pattern  Tests 2-3 times weekly  When testing, will see 106-135  Taking medications pattern  [x]? Consistent administration  [x]? Affordable  Social determinants of health impacting diabetes self-management practices   Concerned that you need to know more about how to stay healthy with diabetes  Overall evaluation:               [x]? Achieving A1c target with drug therapy & self-care practices    Subjective   I am doing good\"       On home inotropes since Nov  Objective   Physical exam  General           Normal weight male in acute post-op pain. Conversant and cooperative  Neuro  Alert, oriented, weak, tired  Vital Signs   Visit Vitals  BP (!) 89/61   Pulse 85   Temp 98.7 °F (37.1 °C)   Resp 30   Ht 6' 1\" (1.854 m)   Wt 85.7 kg (188 lb 15 oz)   SpO2 100%   BMI 24.93 kg/m²     Skin  Warm and dry. No acanthosis noted along neckline. No lipohypertrophy or lipoatrophy noted at injection sites   Heart   Regular rate and rhythm.  No murmurs, rubs or gallops  Lungs  Clear to auscultation without rales or rhonchi  Extremities No foot wounds      Laboratory  Recent Labs     04/11/22  0455 04/10/22  0300 04/09/22  0424   * 135* 107*   AGAP 8 6 6   WBC 8.2 7.9 7.7   CREA 0.79 0.92 0.87   GFRNA >60 >60 >60   * 188* 160*   ALT 62 61 43       Factors impacting BG management  Factor Dose Comments   Nutrition:  Standard meals       60 grams/meal        CHF On chronic milrinone Impaired insulin delivery         Blood glucose pattern      Significant diabetes-related events over the past 24-72 hours  A1C 6.8%  LVAD 4/5  Lac 2.6,   Full liq diet  On insulin, dobutamine, epi, heparin, cardene amio  On insulin gtt:   4/5: 21.8 units post-op  4/6: 25.8 units  4/7: 116.9 units  4/8: 15.6 units since MN   4/9-4/11: Transitioned off insulin infusion on 4/9/22.    4/11/22: , required 3 units correctional insulin on 4/10/22. Assessment and Plan   Nursing Diagnosis Risk for unstable blood glucose pattern   Nursing Intervention Domain 5256 Decision-making Support   Nursing Interventions Examined current inpatient diabetes/blood glucose control   Explored factors facilitating and impeding inpatient management  Explored corrective strategies with patient and responsible inpatient provider   Informed patient of rational for insulin strategy while hospitalized     Evaluation   Erin Kellogg is a 69year old gentleman, with controlled Type 2 Diabetes, admitted for medical optimization for severe ischemic cardiomyopathy and heart failure with reduced ejection fraction prior to LVAD implant. A1C on admission was 6.8% and BG was in goal without the need for anti-hyperglycemic agents pre-op. Following LVAD implant, he was started on an insulin gtt which has maintained glucose in goal but with elevated insulin rates in the setting of stress hyperglycemia, increased hepatic congestion and acidosis. Insulin sensitivity is much improved today and insulin rates decreased. Reasonable to transition off insulin gtt today. Once off insulin gtt, please target an inpatient -180mg/dl. 4/9-4/11/22: Progressing post op, BG remains in target, 140-180 post op- and requiring minimal amounts of correction -  Recommendations   1. CONTINUE correctional insulin for BG >140. IF BG start to trend >200 consistently, start low dose basal insulin at 0.1u/kg.    Billing Code(s)   [x] 15520    Before making these care recommendations, I personally reviewed the hospitalization record, including notes, laboratory & diagnostic data and current medications, and examined the patient at the bedside (circumstances permitting) before making care recommendations. More than fifty (50) percent of the time was spent in patient counseling and/or care coordination.   Total minutes: [id number] FAROOQ Jeronimo  Diabetes Clinical Nurse Specialist  [facility]  Access via [address] [place] [address]

## 2022-04-11 NOTE — PROGRESS NOTES
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 Intra-op course course complicated by bleeding requiring multiple rounds of pRBC's, platelets, FFP, cellsaver, and cryoprecipitate. Intra-op CHEL significant for moderate to severe RV dysfunction. ICU issues include:    1) Cardiogenic shock    General/neuro: The patient is alert and oriented. No acute issues. Respiratory: Encourage mobility, incentive spirometry, respiratory hygiene measures. Cardiac: Status post HeartMate 3 LVAD and aortic valve closure, day 6. Slow dobutamine wean in progress for right heart support. P.o. antihypertensives for afterload reduction. LVAD flows at 4600 RPMs, no device related issues. Further management as per heart failure service. GI: Advance diet as tolerated. ID: White count stable at 7.9. No need for antibiotic therapy. Renal: Creatinine at 0.79 from 0.92. Bumex as needed. Prophylaxis: Weight-based heparin for therapeutic anticoagulation. Subjective:   Progress Note: 2022      Reason for ICU Admission: Post LVAD    HPI: As Above    Overnight Events:   2022      POD:  Day of Surgery    S/P:   Procedure(s):  REDO STERNOTOMY; RIGHT GROIN CUTDOWN FOR CANNULATION;  INSERTION OF LEFT VENTRICULAR ASSIST DEVICE (HMIII); AORTIC VALVE CLOSURE CHEL BY DR. Debbie Landa.     Active Problem List:     Problem List  Date Reviewed: 3/17/2022          Codes Class    S/P ventricular assist device [facility]) ICD-10-CM: J70.154  ICD-9-CM: V45.89     Overview Signed 4/5/2022  2:22 PM by YOLI Morales     REDO STERNOTOMY   RIGHT GROIN CUTDOWN FOR CANNULATION with access of CVF and CFA  INSERTION OF LEFT VENTRICULAR ASSIST DEVICE (HMIII)  AORTIC VALVE CLOSURE with Park's stitch             HFrEF (heart failure with reduced ejection fraction) ([facility]) ICD-10-CM: I50.20  ICD-9-CM: 428.20         NSTEMI (non-ST elevated myocardial infarction) ([facility]) ICD-10-CM: I21.4  ICD-9-CM: 410.70         Acute on chronic clinical systolic heart failure ([facility]) ICD-10-CM: I50.23  ICD-9-CM: 428.23         Active advance directive on file ICD-10-CM: Z78.9  ICD-9-CM: V49.89         Type 2 diabetes, controlled, with peripheral circulatory disorder ([facility]) ICD-10-CM: E11.51  ICD-9-CM: 250.70, 443.81         CHF, stage C ([facility] ICD-10-CM: I50.9  ICD-9-CM: 428.0         Mixed hyperlipidemia ICD-10-CM: E78.2  ICD-9-CM: 272.2         Proteinuria ICD-10-CM: R80.9  ICD-9-CM: 791.0         BPH without obstruction/lower urinary tract symptoms ICD-10-CM: N40.0  ICD-9-CM: 600.00         Hematuria, gross ICD-10-CM: R31.0  ICD-9-CM: 599.71         Cardiac arrest ([facility] ICD-10-CM: I46.9  ICD-9-CM: 427.5         Insomnia, unspecified ICD-10-CM: G47.00  ICD-9-CM: 780.52         Encounter for long-term (current) use of other medications ICD-10-CM: Z79.899  ICD-9-CM: V58.69         Calculus of kidney ICD-10-CM: N20.0  ICD-9-CM: 592.0         Coronary atherosclerosis of native coronary artery ICD-10-CM: I25.10  ICD-9-CM: 414.01         Benign neoplasm of colon ICD-10-CM: D12.6  ICD-9-CM: 211.3         Unspecified sleep apnea ICD-10-CM: G47.30  ICD-9-CM: 780.57         Reflux esophagitis ICD-10-CM: K21.00  ICD-9-CM: 530.11               Past Medical History:      has a past medical history of CAD (coronary artery disease) ('90 '97, '13 x 2), Calculus of kidney, Colonic polyps, Congestive heart failure, unspecified, Diabetes ([facility] Gastritis, Hypercholesterolemia, and Sleep apnea.     Past Surgical History:      has a past surgical history that includes hx heent; colonoscopy (04/06/2011); endoscopy, colon, diagnostic; hx coronary artery bypass graft (8/22/12); hx pacemaker placement (1/30/13); pr cardiac surg procedure unlist (2012); and colonoscopy (N/A, 3/2/2022). Home Medications:     Prior to Admission medications    Medication Sig Start Date End Date Taking? Authorizing Provider   potassium chloride SR (K-TAB) 20 mEq tablet Take 2 Tablets by mouth two (2) times a day. 3/9/22  Yes Bhavin Wong NP   milrinone (PRIMACOR) 20 mg/100 mL (200 mcg/mL) infusion 28.5 mcg/min by IntraVENous route continuous. 3/3/22  Yes Bhavin Wong NP   tamsulosin (FLOMAX) 0.4 mg capsule TAKE 1 CAPSULE DAILY 3/2/22  Yes Kaylie Gustafson MD   bumetanide (BUMEX) 2 mg tablet Take 1 Tablet by mouth two (2) times a day. Patient taking differently: Take 2 mg by mouth two (2) times a day. 2 mg in am 1mg in evening 2/11/22  Yes Moy Neal NP   prasugreL (Effient) 10 mg tablet Take 10 mg by mouth daily. Yes Provider, Historical   metFORMIN (GLUCOPHAGE) 500 mg tablet Take 1 Tablet by mouth two (2) times daily (with meals). 1/11/22  Yes Kaylie Gustafson MD   ranolazine ER (Ranexa) 500 mg SR tablet Take 1 Tablet by mouth two (2) times a day. 1/11/22  Yes Kaylie Gustafson MD   eplerenone (INSPRA) 50 mg tab tablet Take 1 Tablet by mouth daily. 1/11/22  Yes Kaylie Gustafson MD   isosorbide mononitrate ER (IMDUR) 30 mg tablet Take 1 Tablet by mouth every twelve (12) hours. 1/4/22  Yes Trace Hernandez NP   hydrALAZINE (APRESOLINE) 50 mg tablet TAKE 1 TABLET BY MOUTH THREE TIMES DAILY 12/17/21  Yes Larissa Stewart NP   dapagliflozin [place]) 10 mg tab tablet Take 1 Tablet by mouth daily.  11/12/21  Yes Shira Doshi NP   omeprazole (PRILOSEC) 20 mg capsule TAKE 1 CAPSULE BY MOUTH DAILY FOR INDIGESTION 7/6/21  Yes Magaly PAREDES NP   rosuvastatin (CRESTOR) 10 mg tablet TAKE 1 TABLET NIGHTLY 4/26/21  Yes Gustabo Morales MD   aspirin delayed-release 81 mg tablet Take 81 mg by mouth daily. Yes Provider, Historical   benzonatate (TESSALON) 200 mg capsule Take 200 mg by mouth three (3) times daily as needed for Cough. Patient not taking: Reported on 3/7/2022    Provider, Historical   diphenhydrAMINE (Benadryl Allergy) 25 mg tablet Take 25 mg by mouth every six (6) hours as needed. Needed every night d/t picc line causing itching  Patient not taking: Reported on 4/3/2022    Provider, Historical   glucose blood VI test strips (OneTouch Ultra Test) strip USE TO TEST BLOOD SUGAR DAILY 7/20/21   Chanel Gustafson MD   OneTouch Delica Plus Lancet 33 gauge misc USE TO TEST BLOOD SUGAR DAILY 7/20/21   Chanel Gustafson MD   lancets misc Use to test blood sugar daily 6/16/20   Chanel Gustafson MD   acetaminophen (TYLENOL EXTRA STRENGTH) 500 mg tablet Take  by mouth every six (6) hours as needed. Patient not taking: Reported on 3/17/2022    Provider, Historical       Allergies/Social/Family History: Allergies   Allergen Reactions    Oxycodone Anaphylaxis    Pcn [Penicillins] Hives      Social History     Tobacco Use    Smoking status: Never Smoker    Smokeless tobacco: Never Used   Substance Use Topics    Alcohol use: Yes     Alcohol/week: 0.0 standard drinks     Comment:  VERY RARE      Family History   Problem Relation Age of Onset    Heart Disease Mother         MI    Heart Disease Father         CAD & PVD    Lung Disease Father     Cancer Father         lung    Diabetes Maternal Grandmother     Heart Disease Other         CAD    Stroke Sister        Review of Systems:     A comprehensive review of systems was negative except for that written in the HPI.     Objective:   Vital Signs:  Visit Vitals  BP (!) 89/61   Pulse 84   Temp 98.7 °F (37.1 °C)   Resp 27   Ht 6' 1\" (1.854 m)   Wt 85.7 kg (188 lb 15 oz)   SpO2 100%   BMI 24.93 kg/m²    O2 Flow Rate (L/min): 4 l/min O2 Device: None (Room air) Temp (24hrs), Av.3 °F (36.8 °C), Min:97.5 °F (36.4 °C), Max:98.9 °F (37.2 °C)    CVP (mmHg): 5 mmHg (22 0700)      Intake/Output:     Intake/Output Summary (Last 24 hours) at 2022 0749  Last data filed at 2022 0700  Gross per 24 hour   Intake 1286 ml   Output 2920 ml   Net -1634 ml       Physical Exam:    General appearance: no distress, appears stated age  Respiratory: Decreased breath sounds b/l  Cardiac: Normal Sinus rhythm   Abdomen: soft, hypoactive sounds  Extremities: warm, perfused. LABS AND  DATA: Personally reviewed  Recent Labs     04/11/22  0455 04/10/22  030   WBC 8.2 7.9   HGB 8.8* 8.6*   HCT 27.2* 26.1*   * 125*     Recent Labs     04/11/22  0455 04/10/22  0300   * 132*   K 4.1 3.8    102   CO2 22 24   BUN 22* 29*   CREA 0.79 0.92   * 135*   CA 8.2* 8.1*   MG 2.1 2.0     Recent Labs     04/11/22  0455 04/10/22  0300    109   TP 5.9* 5.6*   ALB 2.6* 3.1*   GLOB 3.3 2.5     Recent Labs     22  0455 04/10/22  2003 04/10/22  1058 04/10/22  0300   INR 1.3*  --   --  1.2*   PTP 13.0*  --   --  12.3*   APTT 65.5* 85.6*   < > 62.6*    < > = values in this interval not displayed. No results for input(s): PHI, PCO2I, PO2I, FIO2I in the last 72 hours. No results for input(s): CPK, CKMB, TROIQ, BNPP in the last 72 hours. Hemodynamics:   PAP: PAP Systolic: 38 (35/00/62 6322) CO: CO (l/min): 4.7 l/min (22 1400)   Wedge:   CI: CI (l/min/m2): 2.4 l/min/m2 (22 1400)   CVP:  CVP (mmHg): 5 mmHg (22 0700) SVR:       PVR:       Ventilator Settings:  Mode Rate Tidal Volume Pressure FiO2 PEEP   Spontaneous,Pressure support   520 ml  5 cm H2O 40 % 5 cm H20     Peak airway pressure: 18 cm H2O    Minute ventilation: 7.71 l/min        MEDS: Reviewed    Chest X-Ray:  CXR Results  (Last 48 hours)               04/10/22 1228  XR CHEST PORT Final result    Impression:  Suspect a small left pneumothorax. No other significant change. Narrative:  INDICATION:    portable r/o pneumothorax        EXAMINATION:  AP CHEST, PORTABLE       COMPARISON: Earlier today       FINDINGS: Single AP portable view of the chest at 1219 hours demonstrates   increase in size of the curvilinear lucency in the left superior and mid   hemithorax, compatible with a pneumothorax. There is a left-sided chest tube. LVAD is present. There has been no other significant change. The   cardiomediastinal silhouette is stable. There is no new airspace disease. 04/10/22 0415  XR CHEST PORT Final result    Impression:  Curvilinear lucency overlying the left hemithorax is likely a skinfold, and less   likely a pneumothorax. Tubes and lines are unchanged. Short-term follow-up study   is recommended. Narrative:  INDICATION:    HF        EXAMINATION:  AP CHEST, PORTABLE       COMPARISON: 4/9/2022       FINDINGS: Single AP portable view of the chest at 4:15 hours demonstrates no   change in position of the lines and tubes. The cardiomediastinal silhouette is   stable. LVAD is noted. There is no new airspace disease. Curvilinear line   overlying the left hemithorax is likely a skinfold. Chest tubes are present. ECHO:        Multidisciplinary Rounds Completed: Yes    ABCDEF Bundle/Checklist Completed:  Yes    SPECIAL EQUIPMENT  PA Catheter    DISPOSITION  Stay in ICU    CRITICAL CARE CONSULTANT NOTE  I had a face to face encounter with the patient, reviewed and interpreted patient data including clinical events, labs, images, vital signs, I/O's, and examined patient. I have discussed the case and the plan and management of the patient's care with the consulting services, the bedside nurses and the respiratory therapist.      NOTE OF PERSONAL INVOLVEMENT IN CARE   This patient has a high probability of imminent, clinically significant deterioration, which requires the highest level of preparedness to intervene urgently.  I participated in the decision-making and personally managed or directed the management of the following life and organ supporting interventions that required my frequent assessment to treat or prevent imminent deterioration. I personally spent 50 minutes of critical care time. This is time spent at this critically ill patient's bedside actively involved in patient care as well as the coordination of care. This does not include any procedural time which has been billed separately.     Michele Morales DO  Staff Intensivist/Anesthesiologist  [facility]  4/11/2022

## 2022-04-11 NOTE — PROGRESS NOTES
0800: Bedside and Verbal shift change report given to Swetha BRADLEY (oncoming nurse) by Jolie Jolly (offgoing nurse). Report included the following information SBAR and Cardiac Rhythm A Fib.     0944: L Polliard NP at bedside. Dobut decreased to 5    1203: Dobut increased back to 6 per L Polliard NP d/t increased CVP    1409: Updated L Polliard NP on pt's CT output 160 over 1 hr    2000: Bedside and Verbal shift change report given to Jolie Jolly (oncoming nurse) by Guillermo Brown (offgoing nurse). Report included the following information SBAR and Cardiac Rhythm A Fib.

## 2022-04-11 NOTE — PROGRESS NOTES
Problem: Self Care Deficits Care Plan (Adult)  Goal: *Acute Goals and Plan of Care (Insert Text)  Description: FUNCTIONAL STATUS PRIOR TO ADMISSION: pt lives with wife, independent prior    HOME SUPPORT: The patient lived with wife but did not require assist.    Occupational Therapy Goals  Initiated 4/8/2022  1. Patient will perform ADLs standing 5 mins without fatigue or LOB with supervision/set-up within 7 day(s). 2.  Patient will perform lower body ADLs with minimal assistance/contact guard assist within 7 day(s). 3.  Patient will perform upper body ADLs with minimal assistance/contact guard assist within 7 day(s). 4.  Patient will perform toilet transfers with minimal assistance/contact guard assist within 7 day(s). 5.  Patient will perform all aspects of toileting with minimal assistance/contact guard assist within 7 day(s). 6.  Patient will participate in cardiac/sternal upper extremity therapeutic exercise/activities to increase independence with ADLs with supervision/set-up for 5 minutes within 7 day(s). 7.  Patient will perform VAD switchover routine as part of ADL routine (including batteries<>batteries, battery clip<>battery clip, mock SC<>SC) with minimal assistance/contact guard assist within 7 days. Outcome: Progressing Towards Goal       OCCUPATIONAL THERAPY TREATMENT  Patient: Enrico Hess (53 y.o. male)  Date: 4/11/2022  Diagnosis: HFrEF (heart failure with reduced ejection fraction) ([facility]) [I50.20] <principal problem not specified>  Procedure(s) (LRB):  REDO STERNOTOMY; RIGHT GROIN CUTDOWN FOR CANNULATION;  INSERTION OF LEFT VENTRICULAR ASSIST DEVICE (HMIII); AORTIC VALVE CLOSURE CHEL BY DR. Almeida Bachelor. (N/A) 6 Days Post-Op  Precautions: Fall (move in the tube, LVAD)  Chart, occupational therapy assessment, plan of care, and goals were reviewed. ASSESSMENT  Patient continues with skilled OT services and is progressing towards goals.   Pt received in chair, issued large LVAD holster vest with pt practicing placing battery and clip into holster container. Pt required mod assist to place battery/clip into holster due to weakness in hands. However, pt able to successfully screw and unscrew white and black power leads without assist. Mod A x 1 to stand from chair due to prolonged time sitting and pt c/o fatigue, verbal cues to place elbows into \"tube. \" Pt required min A x 1 to transfer to bed, with assist of RN for line management, and  Min A for management of LEs into bed. Expect pt to continue to progress well with therapy, recommending HHOT and PT at discharge. Patient is verbalizing and is not demonstrating understanding of mindful-based movements (\"move in the tube\") principles of keeping UEs proximal to ribcage to prevent lateral pull on the sternum during load-bearing activities with verbal cues required for compliance. Current Level of Function Impacting Discharge (ADLs): mod A X 1 to stand, decreased activity tolerance, cues for mindful movement, new LVAD    Other factors to consider for discharge: from home         PLAN :  Patient continues to benefit from skilled intervention to address the above impairments. Continue treatment per established plan of care to address goals. Recommend with staff: OOB to chair BSC    Recommend next OT session: ADLs, LVAD education     Recommendation for discharge: (in order for the patient to meet his/her long term goals)  Occupational therapy at least 2 days/week in the home AND ensure assist and/or supervision for safety with LVAD management    This discharge recommendation:  Has been made in collaboration with the attending provider and/or case management    IF patient discharges home will need the following DME: none       SUBJECTIVE:   Patient stated I start with the white lead then black but never both at the same time.     OBJECTIVE DATA SUMMARY:   Cognitive/Behavioral Status:  Neurologic State: Alert  Orientation Level: Oriented X4  Cognition: Follows commands  Perception: Appears intact  Perseveration: No perseveration noted       Functional Mobility and Transfers for ADLs:  Bed Mobility:  Sit to Supine: Minimum assistance (for LE management onto EOB)    Transfers:  Sit to Stand: Moderate assistance;Assist x1;Additional time (pt sitting in chair for prolonged period of time)     Bed to Chair: Minimum assistance (assist of second for line management/assist)    Balance:  Sitting: Intact  Standing: Impaired; Without support  Standing - Static: Fair  Standing - Dynamic : Fair    ADL Intervention:          LVAD power transition  Patient performed switchover from power module to battery. Completed the following tasks:   Independent Verbal cues Physical assist Comments   Don holster vest   x Setup for pt   Check batteries       Check        Ensure  clipped onto stabilization belt       Position batteries in clips       Disconnect power leads x      Insert power lead into battery       [place] batteries in holster   x x    Secure batteries with velcro and clips                  Patient instructed and educated on mindful movement principles based on Move in The Tube concept to include maintaining bilateral elbows close to rib cage when performing any load-bearing activity such as getting in/out of bed, pushing up from a chair, opening a door, or lifting a box. Pain:  none    Activity Tolerance:   Good   Once assisted pt to supine, pt with multiple low flow alams. RN present and pt without complaint    After treatment patient left in no apparent distress:   Supine in bed, Heels elevated for pressure relief and Call bell within reach    COMMUNICATION/COLLABORATION:   The patients plan of care was discussed with: Physical therapist and Registered nurse.      Susu Landeros OT  Time Calculation: 26 mins

## 2022-04-11 NOTE — PROGRESS NOTES
0730: Bedside and Verbal shift change report given to [address] (oncoming nurse) by Juliane Schultz (offgoing nurse). Report included the following information SBAR, Kardex, Intake/Output, Recent Results, Med Rec Status and Cardiac Rhythm NSR PACs PVCs. 0900: Walked with PT, tolerated well VSS. Returned to Chair following PT.     1000: MD Anahy Fitch at bedside, plan for day reviewed with RN and patient. Wean O2, increase hydralazine dose, leave paul in for today due to genital swelling, add coumadin dose tonight. Repeat CXR due to poor imaging. 1200: Patient weaned to Room Air- tolerating well sats 98%. 1215: Patient returned to bed- had been up in chair since 0900.     [id number]: Driveline dressing changed with Alease Soles (patient's daughter). Alease Soles completed the dressing change with verbal and physical assistance by RN. Jaymie Singletary (Patient's wife) observed the dressing change. Opportunity for questions and clarification provided. 1700: Patient OOB to chair. [id number]: Patient with multiple low flows, BP noted to be in the mid 80s. 1815: Patient on commode. No BM but passing gas. 1830: Patient returned to bed. 1930: Bedside and Verbal shift change report given to [address] (oncoming nurse) by Juliane Schultz (offgoing nurse). Report included the following information SBAR, Kardex, Intake/Output, Recent Results, Med Rec Status and Cardiac Rhythm NSR PACs PVCs.

## 2022-04-11 NOTE — PROGRESS NOTES
2000 Bedside and Verbal shift change report given to Linda Mcghee RN (oncoming nurse) by Geoffrey Velásquez RN (offgoing nurse). Report included the following information SBAR, Kardex, OR Summary, Procedure Summary, Intake/Output, MAR, Recent Results and Cardiac Rhythm NSR w/ PVC's + PAC's. Uneventful shift. 0000 Bedside and Verbal shift change report given to Mere Hoover RN (oncoming nurse) by Linda Mcghee RN (offgoing nurse). Report included the following information SBAR, Kardex, Procedure Summary, Intake/Output, MAR, Recent Results and Cardiac Rhythm NSR w/ PAC amd PVC's.

## 2022-04-11 NOTE — PROGRESS NOTES
Problem: Mobility Impaired (Adult and Pediatric)  Goal: *Acute Goals and Plan of Care (Insert Text)  Description: FUNCTIONAL STATUS PRIOR TO ADMISSION: Patient was independent and active without use of DME.    HOME SUPPORT PRIOR TO ADMISSION: The patient lived with his wife but did not require assist.    Physical Therapy Goals  Initiated 4/8/2022  1. Patient will move from supine to sit and sit to supine , scoot up and down, and roll side to side in bed with supervision/set-up within 7 days. 2.  Patient will perform sit to/from stand with supervision/set-up within 7 days. 3.  Patient will ambulate 50 feet with least restrictive assistive device and minimal assistance/contact guard assist within 7 days. 4.  Patient will ascend/descend 6 stairs with handrail(s) with minimal assistance/contact guard assist within 14 days. 5.  Patient will perform cardiac exercises per protocol with supervision/set-up within 7 days. 6.  Patient will verbally and functionally recall 3/3 sternal precautions within 7 days. 7.  Patient will perform power exchange for power module to/from battery with minimal assistance within 7 days. Outcome: Progressing Towards Goal     PHYSICAL THERAPY TREATMENT  Patient: Holli Navarro (89 y.o. male)  Date: 4/11/2022  Diagnosis: HFrEF (heart failure with reduced ejection fraction) ([facility]) [I50.20] <principal problem not specified>  Procedure(s) (LRB):  REDO STERNOTOMY; RIGHT GROIN CUTDOWN FOR CANNULATION;  INSERTION OF LEFT VENTRICULAR ASSIST DEVICE (HMIII); AORTIC VALVE CLOSURE CHEL BY DR. Paz Spurling. (N/A) 6 Days Post-Op  Precautions: Fall (move in the tube, LVAD)  Chart, physical therapy assessment, plan of care and goals were reviewed. ASSESSMENT  Patient continues with skilled PT services and is progressing towards goals. Received supine in bed. Pt completed PM to battery switchover with verbal cues to insert the power lead into the battery.   Completed vest set up w/ manual assist.  Patient able to correctly identify LVAD parts. Pt was sitting EOB during switchover and vest set up often leaning onto his right elbow d/t fatigue w/ time sitting. He had several low flow alarms. RN in the room assisting, cleared therapy to proceed with session. Pt stood on the first attempt using rocking momentum and ambulated 150 ft around the unit with CGA w/ mildly unsteady gait though with no overt loss of balance. Pt remained sitting up in the chair and battery power, RN in the room. Current Level of Function Impacting Discharge (mobility/balance): Min A    Other factors to consider for discharge: LVAD         PLAN :  Patient continues to benefit from skilled intervention to address the above impairments. Continue treatment per established plan of care. to address goals. Recommendation for discharge: (in order for the patient to meet his/her long term goals)  Physical therapy at least 2 days/week in the home AND ensure assist and/or supervision for safety with LVAD and mobility    This discharge recommendation:  Has been made in collaboration with the attending provider and/or case management    IF patient discharges home will need the following DME: none       SUBJECTIVE:   Patient stated tired.     OBJECTIVE DATA SUMMARY:   Cardiac diagnosis intervention:  The patient required verbal cues to maintain sternal precautions during functional activity. Critical Behavior:  Neurologic State: Alert  Orientation Level: Oriented X4  Cognition: Follows commands     Functional Mobility Training:  Bed Mobility:  Supine to Sit: Minimum assistance;Assist x1;Bed Modified (HOB partially raised)  Sit to Supine: Minimum assistance (for LE management onto EOB)           Transfers:  Sit to Stand: Minimum assistance; Other (comment) (from bed)  Stand to Sit: Minimum assistance;Assist x1 (to control descent)  Bed to Chair: Minimum assistance (assist of second for line management/assist) Balance:  Sitting: Intact  Standing: Impaired; Without support  Standing - Static: Fair  Standing - Dynamic : Fair  Ambulation/Gait Training:  Distance (ft): 140 Feet (ft)  Assistive Device: Gait belt           Gait Abnormalities: Decreased step clearance; Other (LE's flexed)        Base of Support: Widened     Speed/Charlene: Slow (accelerated towards the end)  Step Length: Right shortened;Left shortened                 VAD power transition  Patient performed switchover from power module to battery. Completed the following tasks:   Independent Verbal cues Physical assist Comments   Don holster vest   x    Check batteries  x     Check     W/ RN prior to session   Ensure  clipped onto stabilization belt  x x    Position batteries in clips x      Disconnect power leads x      Insert power lead into battery  x     [place] batteries in holster   x x    Secure batteries with velcro and clips  x x      Patient performed switchover from battery to power module. Completed the following tasks:   Independent Verbal cues Physical assistance Comments   Remove batteries from holster       Disconnect power leads from battery       Reconnect power leads into power module       Remove batteries from clips       Doff holster vest         Therapeutic Exercises:   Completed w/ OT today. Pt reporting compliance w/ exercises outside of therapy session    Pain Rating:      Activity Tolerance:   Fair, requires rest breaks, and on room air today      After treatment patient left in no apparent distress:   Sitting in chair, Call bell within reach, and RN in the room assisting    COMMUNICATION/COLLABORATION:   The patients plan of care was discussed with: Occupational therapist and Registered nurse.      Janet Smallwood, PT   Time Calculation: 45 mins

## 2022-04-12 ENCOUNTER — APPOINTMENT (OUTPATIENT)
Dept: NON INVASIVE DIAGNOSTICS | Age: 77
DRG: 001 | End: 2022-04-12
Attending: NURSE PRACTITIONER
Payer: MEDICARE

## 2022-04-12 ENCOUNTER — APPOINTMENT (OUTPATIENT)
Dept: GENERAL RADIOLOGY | Age: 77
DRG: 001 | End: 2022-04-12
Attending: NURSE PRACTITIONER
Payer: MEDICARE

## 2022-04-12 LAB
ALBUMIN SERPL-MCNC: 2.8 G/DL (ref 3.5–5)
ALBUMIN/GLOB SERPL: 0.9 {RATIO} (ref 1.1–2.2)
ALP SERPL-CCNC: 107 U/L (ref 45–117)
ALT SERPL-CCNC: 59 U/L (ref 12–78)
ANION GAP SERPL CALC-SCNC: 6 MMOL/L (ref 5–15)
APTT PPP: 71.7 SEC (ref 22.1–31)
AST SERPL-CCNC: 146 U/L (ref 15–37)
BILIRUB SERPL-MCNC: 1.3 MG/DL (ref 0.2–1)
BNP SERPL-MCNC: 4834 PG/ML
BUN SERPL-MCNC: 18 MG/DL (ref 6–20)
BUN/CREAT SERPL: 22 (ref 12–20)
CALCIUM SERPL-MCNC: 8.5 MG/DL (ref 8.5–10.1)
CHLORIDE SERPL-SCNC: 101 MMOL/L (ref 97–108)
CO2 SERPL-SCNC: 25 MMOL/L (ref 21–32)
CREAT SERPL-MCNC: 0.82 MG/DL (ref 0.7–1.3)
ECHO AO ROOT DIAM: 3.5 CM
ECHO AO ROOT INDEX: 1.67 CM/M2
ECHO LA DIAMETER INDEX: 1.53 CM/M2
ECHO LA DIAMETER: 3.2 CM
ECHO LA TO AORTIC ROOT RATIO: 0.91
ECHO LV FRACTIONAL SHORTENING: 6 % (ref 28–44)
ECHO LV INTERNAL DIMENSION DIASTOLE INDEX: 2.49 CM/M2
ECHO LV INTERNAL DIMENSION DIASTOLIC: 5.2 CM (ref 4.2–5.9)
ECHO LV INTERNAL DIMENSION SYSTOLIC INDEX: 2.34 CM/M2
ECHO LV INTERNAL DIMENSION SYSTOLIC: 4.9 CM
ECHO LV IVSD: 1.6 CM (ref 0.6–1)
ECHO LV MASS 2D: 325.8 G (ref 88–224)
ECHO LV MASS INDEX 2D: 155.9 G/M2 (ref 49–115)
ECHO LV POSTERIOR WALL DIASTOLIC: 1.3 CM (ref 0.6–1)
ECHO LV RELATIVE WALL THICKNESS RATIO: 0.5
ECHO RV INTERNAL DIMENSION: 3.6 CM
ECHO RV TAPSE: 0.7 CM (ref 1.7–?)
ECHO TV REGURGITANT MAX VELOCITY: 2.48 M/S
ECHO TV REGURGITANT PEAK GRADIENT: 25 MMHG
ERYTHROCYTE [DISTWIDTH] IN BLOOD BY AUTOMATED COUNT: 23.4 % (ref 11.5–14.5)
GLOBULIN SER CALC-MCNC: 3.1 G/DL (ref 2–4)
GLUCOSE BLD STRIP.AUTO-MCNC: 145 MG/DL (ref 65–117)
GLUCOSE BLD STRIP.AUTO-MCNC: 182 MG/DL (ref 65–117)
GLUCOSE BLD STRIP.AUTO-MCNC: 199 MG/DL (ref 65–117)
GLUCOSE SERPL-MCNC: 157 MG/DL (ref 65–100)
HCT VFR BLD AUTO: 26.1 % (ref 36.6–50.3)
HGB BLD-MCNC: 8.5 G/DL (ref 12.1–17)
INR PPP: 1.7 (ref 0.9–1.1)
LACTATE SERPL-SCNC: 1.4 MMOL/L (ref 0.4–2)
MAGNESIUM SERPL-MCNC: 2 MG/DL (ref 1.6–2.4)
MCH RBC QN AUTO: 26.3 PG (ref 26–34)
MCHC RBC AUTO-ENTMCNC: 32.6 G/DL (ref 30–36.5)
MCV RBC AUTO: 80.8 FL (ref 80–99)
NRBC # BLD: 0.02 K/UL (ref 0–0.01)
NRBC BLD-RTO: 0.2 PER 100 WBC
PLATELET # BLD AUTO: 161 K/UL (ref 150–400)
PMV BLD AUTO: 10.7 FL (ref 8.9–12.9)
POTASSIUM SERPL-SCNC: 4.3 MMOL/L (ref 3.5–5.1)
PROT SERPL-MCNC: 5.9 G/DL (ref 6.4–8.2)
PROTHROMBIN TIME: 16.8 SEC (ref 9–11.1)
RBC # BLD AUTO: 3.23 M/UL (ref 4.1–5.7)
SERVICE CMNT-IMP: ABNORMAL
SODIUM SERPL-SCNC: 132 MMOL/L (ref 136–145)
THERAPEUTIC RANGE,PTTT: ABNORMAL SECS (ref 58–77)
WBC # BLD AUTO: 9.1 K/UL (ref 4.1–11.1)

## 2022-04-12 PROCEDURE — 93750 INTERROGATION VAD IN PERSON: CPT | Performed by: THORACIC SURGERY (CARDIOTHORACIC VASCULAR SURGERY)

## 2022-04-12 PROCEDURE — 83605 ASSAY OF LACTIC ACID: CPT

## 2022-04-12 PROCEDURE — 36415 COLL VENOUS BLD VENIPUNCTURE: CPT

## 2022-04-12 PROCEDURE — 93306 TTE W/DOPPLER COMPLETE: CPT

## 2022-04-12 PROCEDURE — 93308 TTE F-UP OR LMTD: CPT

## 2022-04-12 PROCEDURE — 74011250637 HC RX REV CODE- 250/637: Performed by: NURSE PRACTITIONER

## 2022-04-12 PROCEDURE — 85610 PROTHROMBIN TIME: CPT

## 2022-04-12 PROCEDURE — 85027 COMPLETE CBC AUTOMATED: CPT

## 2022-04-12 PROCEDURE — 97116 GAIT TRAINING THERAPY: CPT

## 2022-04-12 PROCEDURE — 74011250636 HC RX REV CODE- 250/636: Performed by: NURSE PRACTITIONER

## 2022-04-12 PROCEDURE — 74011250637 HC RX REV CODE- 250/637: Performed by: INTERNAL MEDICINE

## 2022-04-12 PROCEDURE — 80053 COMPREHEN METABOLIC PANEL: CPT

## 2022-04-12 PROCEDURE — 74011000250 HC RX REV CODE- 250: Performed by: NURSE PRACTITIONER

## 2022-04-12 PROCEDURE — P9047 ALBUMIN (HUMAN), 25%, 50ML: HCPCS | Performed by: NURSE PRACTITIONER

## 2022-04-12 PROCEDURE — 83880 ASSAY OF NATRIURETIC PEPTIDE: CPT

## 2022-04-12 PROCEDURE — 82962 GLUCOSE BLOOD TEST: CPT

## 2022-04-12 PROCEDURE — 93750 INTERROGATION VAD IN PERSON: CPT | Performed by: INTERNAL MEDICINE

## 2022-04-12 PROCEDURE — 99292 CRITICAL CARE ADDL 30 MIN: CPT | Performed by: THORACIC SURGERY (CARDIOTHORACIC VASCULAR SURGERY)

## 2022-04-12 PROCEDURE — 85730 THROMBOPLASTIN TIME PARTIAL: CPT

## 2022-04-12 PROCEDURE — 99233 SBSQ HOSP IP/OBS HIGH 50: CPT | Performed by: INTERNAL MEDICINE

## 2022-04-12 PROCEDURE — 97535 SELF CARE MNGMENT TRAINING: CPT

## 2022-04-12 PROCEDURE — 71045 X-RAY EXAM CHEST 1 VIEW: CPT

## 2022-04-12 PROCEDURE — 99291 CRITICAL CARE FIRST HOUR: CPT | Performed by: THORACIC SURGERY (CARDIOTHORACIC VASCULAR SURGERY)

## 2022-04-12 PROCEDURE — 97530 THERAPEUTIC ACTIVITIES: CPT

## 2022-04-12 PROCEDURE — 83735 ASSAY OF MAGNESIUM: CPT

## 2022-04-12 PROCEDURE — 65610000003 HC RM ICU SURGICAL

## 2022-04-12 RX ORDER — BUMETANIDE 0.25 MG/ML
1 INJECTION INTRAMUSCULAR; INTRAVENOUS 3 TIMES DAILY
Status: DISCONTINUED | OUTPATIENT
Start: 2022-04-12 | End: 2022-04-15

## 2022-04-12 RX ORDER — AMIODARONE HYDROCHLORIDE 200 MG/1
200 TABLET ORAL 2 TIMES DAILY
Status: COMPLETED | OUTPATIENT
Start: 2022-04-12 | End: 2022-04-26

## 2022-04-12 RX ORDER — GABAPENTIN 100 MG/1
200 CAPSULE ORAL 3 TIMES DAILY
Status: DISCONTINUED | OUTPATIENT
Start: 2022-04-12 | End: 2022-04-29

## 2022-04-12 RX ORDER — FACIAL-BODY WIPES
10 EACH TOPICAL DAILY PRN
Status: DISCONTINUED | OUTPATIENT
Start: 2022-04-12 | End: 2022-05-06 | Stop reason: HOSPADM

## 2022-04-12 RX ORDER — COLCHICINE 0.6 MG/1
0.6 TABLET ORAL DAILY
Status: DISCONTINUED | OUTPATIENT
Start: 2022-04-12 | End: 2022-05-06 | Stop reason: HOSPADM

## 2022-04-12 RX ORDER — KETOROLAC TROMETHAMINE 30 MG/ML
15 INJECTION, SOLUTION INTRAMUSCULAR; INTRAVENOUS
Status: DISPENSED | OUTPATIENT
Start: 2022-04-12 | End: 2022-04-17

## 2022-04-12 RX ADMIN — AMLODIPINE BESYLATE 5 MG: 5 TABLET ORAL at 17:54

## 2022-04-12 RX ADMIN — AMLODIPINE BESYLATE 5 MG: 5 TABLET ORAL at 08:22

## 2022-04-12 RX ADMIN — BISACODYL 10 MG: 10 SUPPOSITORY RECTAL at 15:33

## 2022-04-12 RX ADMIN — BUMETANIDE 1 MG: 0.25 INJECTION, SOLUTION INTRAMUSCULAR; INTRAVENOUS at 08:21

## 2022-04-12 RX ADMIN — AMIODARONE HYDROCHLORIDE 200 MG: 200 TABLET ORAL at 17:54

## 2022-04-12 RX ADMIN — SUCRALFATE 1 G: 1 TABLET ORAL at 17:54

## 2022-04-12 RX ADMIN — SODIUM CHLORIDE, PRESERVATIVE FREE 10 ML: 5 INJECTION INTRAVENOUS at 21:16

## 2022-04-12 RX ADMIN — SUCRALFATE 1 G: 1 TABLET ORAL at 21:12

## 2022-04-12 RX ADMIN — HYDRALAZINE HYDROCHLORIDE 75 MG: 50 TABLET, FILM COATED ORAL at 13:12

## 2022-04-12 RX ADMIN — SODIUM CHLORIDE, PRESERVATIVE FREE 10 ML: 5 INJECTION INTRAVENOUS at 05:55

## 2022-04-12 RX ADMIN — SODIUM CHLORIDE, PRESERVATIVE FREE 10 ML: 5 INJECTION INTRAVENOUS at 13:41

## 2022-04-12 RX ADMIN — GABAPENTIN 200 MG: 100 CAPSULE ORAL at 16:50

## 2022-04-12 RX ADMIN — HYDRALAZINE HYDROCHLORIDE 5 MG: 20 INJECTION INTRAMUSCULAR; INTRAVENOUS at 05:40

## 2022-04-12 RX ADMIN — Medication 20 UNITS/KG/HR: at 15:49

## 2022-04-12 RX ADMIN — ACETAMINOPHEN 650 MG: 325 TABLET ORAL at 10:51

## 2022-04-12 RX ADMIN — FENTANYL CITRATE 25 MCG: 0.05 INJECTION, SOLUTION INTRAMUSCULAR; INTRAVENOUS at 10:49

## 2022-04-12 RX ADMIN — SENNOSIDES AND DOCUSATE SODIUM 1 TABLET: 50; 8.6 TABLET ORAL at 08:23

## 2022-04-12 RX ADMIN — GABAPENTIN 100 MG: 100 CAPSULE ORAL at 08:21

## 2022-04-12 RX ADMIN — BUMETANIDE 1 MG: 0.25 INJECTION, SOLUTION INTRAMUSCULAR; INTRAVENOUS at 13:36

## 2022-04-12 RX ADMIN — FENTANYL CITRATE 25 MCG: 0.05 INJECTION, SOLUTION INTRAMUSCULAR; INTRAVENOUS at 08:01

## 2022-04-12 RX ADMIN — SUCRALFATE 1 G: 1 TABLET ORAL at 08:21

## 2022-04-12 RX ADMIN — HYDRALAZINE HYDROCHLORIDE 75 MG: 50 TABLET, FILM COATED ORAL at 17:54

## 2022-04-12 RX ADMIN — SENNOSIDES AND DOCUSATE SODIUM 1 TABLET: 50; 8.6 TABLET ORAL at 17:54

## 2022-04-12 RX ADMIN — FAMOTIDINE 20 MG: 20 TABLET ORAL at 08:22

## 2022-04-12 RX ADMIN — DOBUTAMINE HYDROCHLORIDE 6 MCG/KG/MIN: 400 INJECTION INTRAVENOUS at 05:51

## 2022-04-12 RX ADMIN — HYDRALAZINE HYDROCHLORIDE 75 MG: 50 TABLET, FILM COATED ORAL at 08:21

## 2022-04-12 RX ADMIN — POLYETHYLENE GLYCOL 3350 17 G: 17 POWDER, FOR SOLUTION ORAL at 17:54

## 2022-04-12 RX ADMIN — GABAPENTIN 200 MG: 100 CAPSULE ORAL at 21:12

## 2022-04-12 RX ADMIN — ALBUMIN (HUMAN) 12.5 G: 0.25 INJECTION, SOLUTION INTRAVENOUS at 08:21

## 2022-04-12 RX ADMIN — ALBUMIN (HUMAN) 12.5 G: 0.25 INJECTION, SOLUTION INTRAVENOUS at 17:54

## 2022-04-12 RX ADMIN — FAMOTIDINE 20 MG: 20 TABLET ORAL at 21:12

## 2022-04-12 RX ADMIN — MIRTAZAPINE 7.5 MG: 15 TABLET, FILM COATED ORAL at 21:12

## 2022-04-12 RX ADMIN — Medication 20 UNITS/KG/HR: at 00:24

## 2022-04-12 RX ADMIN — KETOROLAC TROMETHAMINE 15 MG: 30 INJECTION, SOLUTION INTRAMUSCULAR; INTRAVENOUS at 16:50

## 2022-04-12 RX ADMIN — ASPIRIN 81 MG CHEWABLE TABLET 81 MG: 81 TABLET CHEWABLE at 08:21

## 2022-04-12 RX ADMIN — FENTANYL CITRATE 25 MCG: 0.05 INJECTION, SOLUTION INTRAMUSCULAR; INTRAVENOUS at 21:13

## 2022-04-12 RX ADMIN — WARFARIN SODIUM 3 MG: 2 TABLET ORAL at 16:50

## 2022-04-12 RX ADMIN — HYDRALAZINE HYDROCHLORIDE 75 MG: 50 TABLET, FILM COATED ORAL at 21:12

## 2022-04-12 RX ADMIN — AMIODARONE HYDROCHLORIDE 400 MG: 200 TABLET ORAL at 08:21

## 2022-04-12 RX ADMIN — BUMETANIDE 1 MG: 0.25 INJECTION, SOLUTION INTRAMUSCULAR; INTRAVENOUS at 21:12

## 2022-04-12 RX ADMIN — POTASSIUM CHLORIDE 20 MEQ: 750 TABLET, EXTENDED RELEASE ORAL at 17:54

## 2022-04-12 RX ADMIN — POLYETHYLENE GLYCOL 3350 17 G: 17 POWDER, FOR SOLUTION ORAL at 08:21

## 2022-04-12 RX ADMIN — POTASSIUM CHLORIDE 20 MEQ: 750 TABLET, EXTENDED RELEASE ORAL at 08:22

## 2022-04-12 RX ADMIN — COLCHICINE 0.6 MG: 0.6 TABLET, FILM COATED ORAL at 10:09

## 2022-04-12 NOTE — PROGRESS NOTES
Problem: Mobility Impaired (Adult and Pediatric)  Goal: *Acute Goals and Plan of Care (Insert Text)  Description: FUNCTIONAL STATUS PRIOR TO ADMISSION: Patient was independent and active without use of DME.    HOME SUPPORT PRIOR TO ADMISSION: The patient lived with his wife but did not require assist.    Physical Therapy Goals  Initiated 4/8/2022  1. Patient will move from supine to sit and sit to supine , scoot up and down, and roll side to side in bed with supervision/set-up within 7 days. 2.  Patient will perform sit to/from stand with supervision/set-up within 7 days. 3.  Patient will ambulate 50 feet with least restrictive assistive device and minimal assistance/contact guard assist within 7 days. 4.  Patient will ascend/descend 6 stairs with handrail(s) with minimal assistance/contact guard assist within 14 days. 5.  Patient will perform cardiac exercises per protocol with supervision/set-up within 7 days. 6.  Patient will verbally and functionally recall 3/3 sternal precautions within 7 days. 7.  Patient will perform power exchange for power module to/from battery with minimal assistance within 7 days. Outcome: Progressing Towards Goal     PHYSICAL THERAPY TREATMENT  Patient: Rafaela Gamble (15 y.o. male)  Date: 4/12/2022  Diagnosis: HFrEF (heart failure with reduced ejection fraction) ([facility]) [I50.20] <principal problem not specified>  Procedure(s) (LRB):  REDO STERNOTOMY; RIGHT GROIN CUTDOWN FOR CANNULATION;  INSERTION OF LEFT VENTRICULAR ASSIST DEVICE (HMIII); AORTIC VALVE CLOSURE CHEL BY DR. Sagrario Schneider. (N/A) 7 Days Post-Op  Precautions: Fall (move in the tube, LVAD)  Chart, physical therapy assessment, plan of care and goals were reviewed. ASSESSMENT  Patient continues with skilled PT services and is progressing towards goals. Received pt sitting up in the chair on battery power (switchover completed with OT).   Pt preferred to carry the  today vs around his neck or waist.  He completed sit to stand on the first attempt though with UE's outside the tube. Reeducated pt in mindful based movements during sit to stand. Pt ambulated throughout the unit with intermittent steadying assist required d/t 4 episodes of unsteadiness with loss of balance. Gait is slow and narrow based. Question if carrying the  (altered arm swing) and slow milton contributed to his loss of balance. Patient completed the switchover from battery to PM with assist only for vest management. Patient is verbalizing and is demonstrating understanding of mindful-based movements (\"move in the tube\") principles of keeping UEs proximal to ribcage to prevent lateral pull on the sternum during load-bearing activities with verbal cues required for compliance. Current Level of Function Impacting Discharge (mobility/balance): Min A sit<->stand and ambulating w/o AD    Other factors to consider for discharge: LVAD         PLAN :  Patient continues to benefit from skilled intervention to address the above impairments. Continue treatment per established plan of care. to address goals. Recommendation for discharge: (in order for the patient to meet his/her long term goals)  Physical therapy at least 2 days/week in the home AND ensure assist and/or supervision for safety with LVAD and mobility    This discharge recommendation:  Has been made in collaboration with the attending provider and/or case management    IF patient discharges home will need the following DME: none       SUBJECTIVE:   Patient stated Let's just do it.  (stand up followed by pt pushing through thighs and standing up before education complete)    OBJECTIVE DATA SUMMARY:   Cardiac diagnosis intervention:  The patient required verbal cues to maintain sternal precautions during functional activity.     Critical Behavior:  Neurologic State: Alert  Orientation Level: Oriented X4  Cognition: Follows commands     Functional Mobility Training:  Bed Mobility:  Sit to Supine: Min A x1 for LE's                 Transfers:  Sit to Stand: Minimum assistance  Stand to Sit: Minimum assistance                             Balance:  Sitting: Intact  Standing: Impaired; Without support  Standing - Static: Fair  Standing - Dynamic : Fair;Occasional (LOB x4 requiring steadying assist when ambulating)  Ambulation/Gait Training:  Distance (ft): 160 Feet (ft)  Assistive Device: Gait belt           Gait Abnormalities: Decreased step clearance; Path deviations; Other (legs crossing at times)        Base of Support: Widened     Speed/Charlene: Slow  Step Length: Right shortened;Left shortened                    VAD power transition  Patient performed switchover from power module to battery w/ OT prior to PT session. Completed the following tasks:   Independent Verbal cues Physical assist Comments   Don holster vest       Check batteries       Check        Ensure  clipped onto stabilization belt       Position batteries in clips       Disconnect power leads       Insert power lead into battery       [place] batteries in holster        Secure batteries with velcro and clips         Patient performed switchover from battery to power module. Completed the following tasks:   Independent Verbal cues Physical assistance Comments   Remove batteries from holster x      Disconnect power leads from battery x      Reconnect power leads into power module x      Remove batteries from clips x      Doff holster vest   x        Pain Rating:  None indicated    Activity Tolerance:   Fair, SpO2 stable on RA, requires rest breaks and observed SOB with activity      After treatment patient left in no apparent distress:   Supine in bed, Call bell within reach, Side rails x 3 and RN in the room assisting pt    COMMUNICATION/COLLABORATION:   The patients plan of care was discussed with: Occupational therapist and Registered nurse.      Breann Alvarez Rahul PT   Time Calculation: 31 mins

## 2022-04-12 NOTE — PROGRESS NOTES
I have reviewed and agree with Ananya Roper RN's documentation and care. I have reviewed the [facility] and all flowsheets associated with patient's care today.

## 2022-04-12 NOTE — PROGRESS NOTES
Problem: Self Care Deficits Care Plan (Adult)  Goal: *Acute Goals and Plan of Care (Insert Text)  Description: FUNCTIONAL STATUS PRIOR TO ADMISSION: pt lives with wife, independent prior    HOME SUPPORT: The patient lived with wife but did not require assist.    Occupational Therapy Goals  Initiated 4/8/2022  1. Patient will perform ADLs standing 5 mins without fatigue or LOB with supervision/set-up within 7 day(s). 2.  Patient will perform lower body ADLs with minimal assistance/contact guard assist within 7 day(s). 3.  Patient will perform upper body ADLs with minimal assistance/contact guard assist within 7 day(s). 4.  Patient will perform toilet transfers with minimal assistance/contact guard assist within 7 day(s). 5.  Patient will perform all aspects of toileting with minimal assistance/contact guard assist within 7 day(s). 6.  Patient will participate in cardiac/sternal upper extremity therapeutic exercise/activities to increase independence with ADLs with supervision/set-up for 5 minutes within 7 day(s). 7.  Patient will perform VAD switchover routine as part of ADL routine (including batteries<>batteries, battery clip<>battery clip, mock SC<>SC) with minimal assistance/contact guard assist within 7 days. Outcome: Progressing Towards Goal       OCCUPATIONAL THERAPY TREATMENT  Patient: Lynsey Asencio (55 y.o. male)  Date: 4/12/2022  Diagnosis: HFrEF (heart failure with reduced ejection fraction) ([facility]) [I50.20] <principal problem not specified>  Procedure(s) (LRB):  REDO STERNOTOMY; RIGHT GROIN CUTDOWN FOR CANNULATION;  INSERTION OF LEFT VENTRICULAR ASSIST DEVICE (HMIII); AORTIC VALVE CLOSURE CHEL BY DR. Debbie Landa. (N/A) 7 Days Post-Op  Precautions: Fall (move in the tube, LVAD)  Chart, occupational therapy assessment, plan of care, and goals were reviewed. ASSESSMENT  Patient continues with skilled OT services and is progressing towards goals.   Assisted pt with rony rodriguez switchover from PM to batteries in prep for PT. Pt independently removed and placed power leads into batteries. However, he required physical A for donning holster vest 2* L UE PIVs and placing batteries into holsters. Verbal cues also required for problem solving placing batteries into clips and matching red arrow to arrow. Pt c/o B hands feeling weak in order to push batteries into new holsters. Patient is verbalizing and is demonstrating understanding of mindful-based movements (\"move in the tube\") principles of keeping UEs proximal to ribcage to prevent lateral pull on the sternum during load-bearing activities with verbal cues required for compliance. Current Level of Function Impacting Discharge (ADLs): MOD A for vest, decreased strength in hands, independent switchover    Other factors to consider for discharge: from home, new LVAD         PLAN :  Patient continues to benefit from skilled intervention to address the above impairments. Continue treatment per established plan of care to address goals. Recommend with staff: OOB to chair    Recommend next OT session: ADLs, LVAD    Recommendation for discharge: (in order for the patient to meet his/her long term goals)  Occupational therapy at least 2 days/week in the home     This discharge recommendation:  Has been made in collaboration with the attending provider and/or case management    IF patient discharges home will need the following DME: none       SUBJECTIVE:   Patient stated it's hard for me to put those in there.     OBJECTIVE DATA SUMMARY:   Cognitive/Behavioral Status:  Neurologic State: Alert  Orientation Level: Oriented X4  Cognition: Follows commands             Functional Mobility and Transfers for ADLs:  Bed Mobility:   Received OOB in chair      ADL Intervention:            Patient demonstrated switchover from power module to battery in prep for ADL routine with mod A for management of vest, donning batteries in holsters and cues for aligning arrows of batteries into clips. Demonstrated the following tasks:    Independent Verbal cues Physical assistance Comments   Check PM & SC  x  To notify RN of his LVAD numbers   Ensure  clipped onto stabilization belt   x Pt declined placing around neck, removed  for self test and placed back without A   Remove front (green lighted) batteries from , place back batteries into front slots  x     Check batteries x      Position batteries into battery clips   x Cues to match arrows, pt attempted to place battery upside down   Don holster vest   x Assist around PIVs L UE   Disconnect power leads x      Insert power lead into battery clip x      [place] batteries in holster   x    Secure batteries with Velcro and clips  x x                             Pain:  none    Activity Tolerance:   Good    After treatment patient left in no apparent distress:   Sitting in chair and Call bell within reach    COMMUNICATION/COLLABORATION:   The patients plan of care was discussed with: Physical therapist and Registered nurse.      Juan Antonio Mercado OT  Time Calculation: 16 mins

## 2022-04-12 NOTE — PROGRESS NOTES
Bedside shift change report given to 17[address] Mundo Smart (oncoming nurse) by Humza Tong (offgoing nurse). Report included the following information SBAR, OR Summary, Intake/Output, MAR, Recent Results and Cardiac Rhythm A.Fib.     1445- LVAD dressing changed at this time. Pt's wife provided instructions to RN on the steps. Wife states she will change it tomorrow. [phone]- LVAD educator at bedside to speak with patient and family. 1545- Stand by assistance provided to pt up to the recliner. Placed leigh socks on pt at this time. 2000- Bedside shift change report given to Neftaly Blakely (oncoming nurse) by [address] Mundo Smart (offgoing nurse). Report included the following information SBAR, MAR, Recent Results and Cardiac Rhythm A Fib.

## 2022-04-12 NOTE — PROGRESS NOTES
[facility] in [place], [place]  Inpatient Progress Note      Patient name: Beverly Colindres  Patient : 1945  Patient MRN: [id number]  Consulting MD: Emmanuelle Aiken MD  Date of service: 22    REASON FOR REFERRAL:  Management of LVAD     PLAN OF CARE:   · 68 y/o with severe ischemic cardiomyopathy, LVEF 15-20%; stage D, NYHA class IV symptoms; s/p LVAD implant with HM3 as DT due to age (22)  · Postoperative course:  ? Bleeding intra-op, required cryo, k-centra, FFP, Plt, and cellsaver in OR; resolved  ? RV dysfunction requiring prolonged inotropic support with dobutamine  ?  Routine postoperative care      RECOMMENDATIONS:   POD#7 from LVAD Implant  TTE today to evaluate TR   Continue speed 4800rpms, low speed 4400  Continue dobutamine 6 mcg/kg/min, keep this dose and no plans to wean   Continue hydralazine to 75mg PO QID  Decrease amiodarone to 200 mg PO twice daily due to nausea, vomiting   Continue potassium to 20meq twice daily  Increase amlodipine 5 mg PO BID   Increase Bumex 1 mg IV TID + 25% albumin BID to augment diuresis   Continue Bumex 1mg IV as needed for CVP > 12 (goal 12-14) to prevent LV suckdown  NANDINI hose   Cannot tolerate ARB/ARNi until Cr stable  Heparin gtt per protocol  Continue warfarin 3 mg, goal INR 2-3   ASA 81mg daily  Begin colchicine for pericarditis   Increase gabapentin to 200 mg PO TID   Toradol q6h PRN and before PT/OT; OK with Dr. Troy Mar, renal function stable  Continue fentanyl and Lidocaine patch  D/w pharmacy alternative pain management options; consider tramadol trial if no relief from gabapentin   Driveline dressing changes daily for now until 5/3/22  Continue bowel regimen, add Dulcolax suppository today   Low dose Remeron  Pulmonary toilet, monitor CT output   SSI   CPAP QHS  Advanced care plan forms on file   Strict I/O, daily weights, Na+ restricted diet   Continue VAD education      IMPRESSION:  S/p LVAD implant as DT on 4/5/22 with Dr. Breann Andrade   chronic systolic heart failure  · Stage D, NYHA class IV symptoms improved to class II on inotropes  · Non-ischemic cardiomyopathy, LVEF 20% with LVEDD 6.2  · RV dysfunction, TAPSE 1.22 (prelimnary read)  · TBili 1.5 likely from cardiac congestion  At risk of sudden cardiac death  · Recent cardiac arrest   · S/p ICD (1/2013, [employer] followed by [employer]); New implant on 10/21/2021 [employer]  Coronary artery disease  · S/p multiple interventions  · S/p 4V CABG (8/2012)  · LHC (7/2019) severe stenosis of LIMA to LAD anastomosis site, SVG graft to OM is occluded, SVG graft to RCA 40-50%, LAD occluded proximally, severe proximal LCx, RCA is the long . · PET (6/2019) EF 24% with anterior lateral and inferior reversible defect  Cardiac risk factors  · HTN  · HL  · DM2  · SRUTHI on CPAP  · MIld carotid stenosis  Anemia, microcytic  · Iron deficiency  DVT with filter  Pulmonary nodules   Dysphagia      Interval Events:  No issues overnight  Pain management is an issue   MAPs at goal  [facility]     CARDIAC IMAGING:  Echo (4/8/22)    Left Ventricle: Left ventricle size is normal. Moderately increased wall thickness. Findings consistent with concentric remodeling. Severe global hypokinesis present. Severely reduced left ventricular systolic function with a visually estimated EF of 10 -15%. 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   Aortic Valve: Moderate sclerosis of the aortic valve cusp.   Mitral Valve: Moderately thickened leaflet. Mild transvalvular regurgitation.   Tricuspid Valve: Severe transvalvular regurgitation.   Right Atrium: Right atrium is severely dilated.     Echo (3/2/22)   · LV 10-15%  · Mod-severe MR      Echo (11/23/21):  · LV 15-20%. · Mod-severe, MR, mod-TR     Echo (5/20/21)  · LV: Estimated LVEF is 20 - 25%. Normal wall thickness. Mildly dilated left ventricle.  Severely and globally reduced systolic function. Severe (grade 4) left ventricular diastolic dysfunction. · LA: Severely dilated left atrium. · RA: Severely dilated right atrium. · MV: Moderate mitral valve regurgitation is present. · TV: Moderate tricuspid valve regurgitation is present. · AO: Mild aortic root dilatation. · RV: Pacer/ICD present. · LVED 6.2cm     EKG (5/20/21) SB with 1degree AV block, QRS 116ms     LHC (5/24/21) Native Coronaries: LM - moderate to severe distal disease 50%. % prox occluded (), heavily calcified, LCx: 80% proximal stenosis. OM1 is  (seen filling faintly via collaterals). OM2 99% stenosis. RCA: 100% proximal occluded.  LIMA to LAD: patent, supplies only a diagonal branch (probably D2). The LAD distal to the bifurcation is 100% occluded () and fills via right to left collaterals off the SVG to RCA. SVG to R-PDA: patent with moderate diffuse irregularities but no obstructive disease in the graft.    No appealing interventional targets.      NST as above     ICD Interrogation (11/12/2021) [employer] VVI, thoracic impedence trending up, no events, normal device function and good battery  ICD interrogation (5/12/21) [employer] single lead, no events, normal device function and good battery     HEMODYNAMICS:  RHC 5/24/21 CI 1.97, PA 33/9/17, RA 1, PCWP 6- off milrinone   CPEST not done     Patient underwent a 6 minute walk test 11/12/2021     6 Min Walk Report 11/12/2021 7/6/2021 5/20/2021   (PRE) HR 88 98 74   (PRE) O2 Sat 100 - 99   (POST)  - 81   (POST) O2 Sat 99 98% on Ra 99   Distance in Meters 386.58 - 1158. 24       OTHER IMAGING:  CXR (6/17/21) clear  CT 5/21/21 1. Irregular pulmonary nodule in the left upper lobe measuring 2 cm, suspicious for primary pulmonary malignancy. 2. Additional 6 mm left upper lobe pulmonary nodule. 3. Severe calcific atherosclerosis of the coronary arteries and abdominal aorta. No aneurysm. 4. Cardiomegaly.  5. No acute abnormality within the chest, abdomen, or pelvis.      HISTORY OF PRESENT ILLNESS:  Briefly, Pritesh Jiménez is a 69 y.o. male with h/o HTN, HL, DM2, SRUTHI on CPAP, chronic systolic heart failure, stage D, NYHA class IV symptoms, non-ischemic cardiomyopathy, LVEF 20% with LVEDD 6.2, RV dysfunciton, TAPSE 1.22, TBili 1.5 likely from cardiac congestion, recent cardiac arrest s/p ICD (1/2013, [employer] followed by [facility]), coronary artery disease s/p multiple interventions s/p 4V CABG (8/2012), [facility] (7/2019) severe stenosis of LIMA to LAD anastomosis site, SVG graft to OM is occluded, SVG graft to RCA 40-50%, LAD occluded proximally, severe proximal LCx, RCA is the long . PET (6/2019) EF 24% with anterior lateral and inferior reversible defect. Anemia, microcytic with iron deficiency, DVT with filter.     Patient was referred to [facility] by Dr. Maite Cruz for evaluation of his candidacy for advanced therapies.     Patient agreed to inpatient initiation of palliative infusion of chronic inotropes and evaluation for LVAD-DT. Patient was admitted 5/2-5/25/21. Patient was started on milrinone infusion and had first part of LVAD evaluation completed. Right and left heart cath on 5/24/21 that showed severe diffuse native vessel CAD, with patent grafts (LIMA to D2, SVG to RCA).  Antiplatelet therapy was held during hospitalization and after discharge due to anticipated pulmonary nodule biopsy, bone marrow biopsy and EGD/C-Scope as part of LVAD workup.     Patient was readmitted 5/26-5/28/21 with hypertension, headache and chest pain, his troponin peaked at 10; he was shortly bridged with heparin, otherwise had normal EKG and chest CT negative for PE. Cardiology and AHF service was consulted and patient was discharged home after troponin came down.      Patient has now completed radiation treatment for adenocarcinoma of the lung.  Hem-onc has cleared patient for VAD implant with 90% 2 year prognosis.      He was most recently admitted for elective pre-op EGD and colonoscopy which he tolerated well; path negative for malignancy.      He presented to [facility] on 4/3 for LVAD implant. LVAD INTERROGATION:  Device interrogated in person  Device function normal, normal flow, no events  LVAD   Pump Speed (RPM): 4800  Pump Flow (LPM): 3.1  MAP: 78 (doppler)  PI (Pulsitility Index): 8.1  Power: 3.1   Test: Yes  Back Up  at Bedside & Labeled: Yes  Power Module Test: No  Driveline Site Care: No  Driveline Dressing: Clean, Dry, and Intact  MAP in Therapeutic Range (Outpatient): Yes  Testing  Alarms Reviewed: Yes  Back up SC speed: 4800  Back up Low Speed Limit: 44  Emergency Equipment with Patient?: Yes  Emergency procedures reviewed?: Yes  Drive line site inspected?: Yes  Drive line intergrity inspected?: Yes  Drive line dressing changed?: No    PHYSICAL EXAM:  Visit Vitals  BP (!) 89/61   Pulse 74   Temp (!) 96.4 °F (35.8 °C)   Resp 23   Ht 6' 1\" (1.854 m)   Wt 187 lb 6.3 oz (85 kg)   SpO2 96%   BMI 24.72 kg/m²     General: Patient is well developed, well-nourished in no acute distress  HEENT: Normocephalic and atraumatic. No scleral icterus. Pupils are equal, round and reactive to light and accomodation. No conjunctival injection. Oropharynx is clear. Neck: Supple. No evidence of thyroid enlargements or lymphadenopathy. JVD: Cannot be appreciated   Lungs: Breath sounds are equal and clear bilaterally. No wheezes, rhonchi, or rales. Heart: VAD hum  Abdomen: Soft, no mass or tenderness. No organomegaly or hernia. Bowel sounds present. Genitourinary and rectal: deferred  Extremities: No cyanosis, clubbing. 1+ pitting LE edema. Neurologic: No focal sensory or motor deficits are noted. Grossly intact. Psychiatric: Awake, alert an doriented x 3. Appropriate mood and affect. Skin: Warm, dry and well perfused. No lesions, nodules or rashes are noted.     REVIEW OF SYSTEMS:  General: Denies fever, night sweats. Ear, nose and throat: Denies difficulty hearing, sinus problems, runny nose, post-nasal drip, ringing in ears, mouth sores, loose teeth, ear pain, nosebleeds, sore throate, facial pain or numbess  Cardiovascular: see above in the interval history  Respiratory: Denies cough, wheezing, sputum production, hemoptysis. Gastrointestinal: + nausea, vomiting this AM.   Kidney and bladder: Denies painful urination, frequent urination, urgency, prostate problems and impotence  Musculoskeletal: Denies joint pain, muscle weakness  Skin and hair: Denies change in existing skin lesions, hair loss or increase, breast changes    PAST MEDICAL HISTORY:  Past Medical History:   Diagnosis Date    CAD (coronary artery disease) '90 '97, '13 x 2    [place] [facility] [place] Calculus of kidney     Colonic polyps     Congestive heart failure, unspecified     Diabetes ([facility] [address]     Gastritis     Hypercholesterolemia     Sleep apnea        PAST SURGICAL HISTORY:  Past Surgical History:   Procedure Laterality Date    COLONOSCOPY  04/06/2011    16, due 21    COLONOSCOPY N/A 3/2/2022    COLONOSCOPY    :- [facility] by Lennox Ort[facility] at [facility] ENDOSCOPY    ENDOSCOPY, COLON, DIAGNOSTIC      11, due 16    HX CORONARY ARTERY BYPASS GRAFT  8/22/12    x 4 vessel by MISSY Ramirez    HX HEENT      LASIK    HX PACEMAKER PLACEMENT  1/30/13    [place] CARDIAC SURG PROCEDURE UNLIST  2012    x 4 vessels       FAMILY HISTORY:  Family History   Problem Relation Age of Onset    Heart Disease Mother         MI    Heart Disease Father         CAD & PVD    Lung Disease Father     Cancer Father         lung    Diabetes Maternal Grandmother     Heart Disease Other         CAD    Stroke Sister        SOCIAL HISTORY:  Social History     Socioeconomic History    Marital status:    Tobacco Use    Smoking status: Never Smoker    Smokeless tobacco: Never Used   Vaping Use    Vaping Use: Never used   Substance and Sexual Activity    Alcohol use: Yes     Alcohol/week: 0.0 standard drinks     Comment:  VERY RARE    Drug use: No       LABORATORY RESULTS:     Labs Latest Ref Rng & Units 4/12/2022 4/11/2022 4/10/2022 4/9/2022 4/8/2022 4/8/2022 4/7/2022   WBC 4.1 - 11.1 K/uL 9.1 8.2 7.9 7.7 - 8.6 -   RBC 4.10 - 5.70 M/uL 3.23(L) 3.39(L) 3.31(L) 3.16(L) - 2.91(L) -   Hemoglobin 12.1 - 17.0 g/dL 8.5(L) 8.8(L) 8.6(L) 8. 3(L) 8.5(L) 7. 5(L) 7. 7(L)   Hematocrit 36.6 - 50.3 % 26. 1(L) 27. 2(L) 26. 1(L) 24. 9(L) 25. 7(L) 23. 0(L) 23. 5(L)   MCV 80.0 - 99.0 FL 80.8 80.2 78. 9(L) 78. 8(L) - 79. 0(L) -   Platelets 234 - 878 K/uL 161 144(L) 125(L) 104(L) - 99(L) -   Lymphocytes 12 - 49 % - - - - - - -   Monocytes 5 - 13 % - - - - - - -   Eosinophils 0 - 7 % - - - - - - -   Basophils 0 - 1 % - - - - - - -   Bands 0 - 6 % - - - - - - -   Albumin 3.5 - 5.0 g/dL 2. 8(L) 2. 6(L) 3. 1(L) 3. 1(L) - 3. 0(L) 3.4(L)   Calcium 8.5 - 10.1 MG/DL 8.5 8.2(L) 8. 1(L) 8. 3(L) - 8. 3(L) 8. 3(L)   Glucose 65 - 100 mg/dL 157(H) 136(H) 135(H) 107(H) - 91 206(H)   BUN 6 - 20 MG/DL 18 22(H) 29(H) 30(H) - 25(H) 22(H)   Creatinine 0.70 - 1.30 MG/DL 0.82 0.79 0.92 0.87 - 0.91 1.13   Sodium 136 - 145 mmol/L 132(L) 133(L) 132(L) 132(L) - 133(L) 134(L)   Potassium 3.5 - 5.1 mmol/L 4.3 4.1 3.8 3.8 - 4.0 3.8   TSH 0.36 - 3.74 uIU/mL - - 2.26 - - - -   LDH 85 - 241 U/L - 276(H) 319(H) 289(H) - 306(H) -   Some recent data might be hidden     Lab Results   Component Value Date/Time    TSH 2.26 04/10/2022 03:00 AM    TSH 1.68 03/01/2022 11:50 AM    TSH 1.47 05/26/2021 10:44 PM    TSH 1.97 05/20/2021 04:28 PM    TSH 1.41 02/09/2021 03:05 PM    TSH 1.740 08/14/2018 09:58 AM    TSH 1.710 10/18/2017 12:00 AM       ALLERGY:  Allergies   Allergen Reactions    Oxycodone Anaphylaxis    Pcn [Penicillins] Hives        CURRENT MEDICATIONS:    Current Facility-Administered Medications:     amiodarone (CORDARONE) tablet 200 mg, 200 mg, Oral, BID, Polliard, Anna [place], NP    colchicine tablet 0.6 mg, 0.6 mg, Oral, DAILY, Tracey Alfaro NP, 0.6 mg at 04/12/22 1009    alteplase (CATHFLO) 1 mg in sterile water (preservative free) 1 mL injection, 1 mg, InterCATHeter, PRN, Kishore Najera MD    bisacodyL (DULCOLAX) suppository 10 mg, 10 mg, Rectal, DAILY PRN, Parris Lu MD    warfarin (COUMADIN) tablet 3 mg, 3 mg, Oral, ONCE, Polljanny [facility] Oliv[facility], NP    bumetanide (BUMEX) injection 1 mg, 1 mg, IntraVENous, TID, Polliard [facility], NP, 1 mg at 04/12/22 1336    gabapentin (NEURONTIN) capsule 200 mg, 200 mg, Oral, TID, Polliard, [facility], NP    ketorolac (TORADOL) injection 15 mg, 15 mg, IntraVENous, Q6H PRN, Marvin [facility] Oliv[facility], NP    albumin human 25% (BUMINATE) solution 12.5 g, 12.5 g, IntraVENous, BID, Merhan Wong NP, 12.5 g at 04/12/22 0821    amLODIPine (NORVASC) tablet 5 mg, 5 mg, Oral, BID, Mehran Wong NP, 5 mg at 04/12/22 1145    polyethylene glycol (MIRALAX) packet 17 g, 17 g, Oral, BID, Parris Lu MD, 17 g at 04/12/22 5811    senna-docusate (PERICOLACE) 8.6-50 mg per tablet 1 Tablet, 1 Tablet, Oral, BID, Parris Lu MD, 1 Tablet at 04/12/22 1299    potassium chloride SR (KLOR-CON 10) tablet 20 mEq, 20 mEq, Oral, BID, Parris Lu MD, 20 mEq at 04/12/22 0960    hydrALAZINE (APRESOLINE) tablet 75 mg, 75 mg, Oral, QID, Parris Lu MD, 75 mg at 04/12/22 1312    famotidine (PEPCID) tablet 20 mg, 20 mg, Oral, Q12H, Parris Lu MD, 20 mg at 04/12/22 5337    bumetanide (BUMEX) injection 1 mg, 1 mg, IntraVENous, Q6H PRN, Parris Lu MD, 1 mg at 04/10/22 1234    mirtazapine (REMERON) tablet 7.5 mg, 7.5 mg, Oral, QHS, Terry, Danica B, NP, 7.5 mg at 04/11/22 2139    lidocaine 4 % patch 1 Patch, 1 Patch, TransDERmal, Q24H, Danica Stewart, NP, 1 Patch at 04/12/22 1312    Warfarin NP/MD dosing, , Other, PRN, Terry, Danica B, NP    melatonin tablet 3 mg, 3 mg, Oral, QHS PRN, Aileen Oliveira MD, 3 mg at 04/10/22 0251    hydrALAZINE (APRESOLINE) 20 mg/mL injection 10 mg, 10 mg, IntraVENous, Q6H PRN, Reena Vishal B, NP, 10 mg at 04/10/22 0245    aspirin chewable tablet 81 mg, 81 mg, Oral, DAILY, Reena Republic B, NP, 81 mg at 04/12/22 4939    hydrALAZINE (APRESOLINE) 20 mg/mL injection 5 mg, 5 mg, IntraVENous, Q6H PRN, Reena Vishal B, NP, 5 mg at 04/12/22 0540    fentaNYL (DURAGESIC) 12 mcg/hr patch 1 Patch, 1 Patch, TransDERmal, Q72H, Reena Republic B, NP, 1 Patch at 04/10/22 2310    sodium chloride (NS) flush 5-40 mL, 5-40 mL, IntraVENous, Q8H, Polliard, Cyn [place] T, NP, 10 mL at 04/12/22 1341    sodium chloride (NS) flush 5-40 mL, 5-40 mL, IntraVENous, PRN, Polliard, Blease Augusta, NP    0.9% sodium chloride infusion, 9 mL/hr, IntraVENous, CONTINUOUS, Polliard, Blease Augusta, NP, Last Rate: 6 mL/hr at 04/11/22 0801, 6 mL/hr at 04/11/22 0801    acetaminophen (TYLENOL) tablet 650 mg, 650 mg, Oral, Q6H PRN, Polliard, Blease Augusta, NP, 650 mg at 04/12/22 1051    naloxone (NARCAN) injection 0.4 mg, 0.4 mg, IntraVENous, PRN, Polliard, Blease Augusta, NP    ondansetron [facility]) injection 4 mg, 4 mg, IntraVENous, Q4H PRN, Polliard, Cyn [place] T, NP, 4 mg at 04/11/22 2139    albuterol-ipratropium (DUO-NEB) 2.5 MG-0.5 MG/3 ML, 3 mL, Nebulization, Q6H PRN, Polliard, Blease Augusta, NP    midazolam (VERSED) injection 1 mg, 1 mg, IntraVENous, Q1H PRN, Polliard, Blease Augusta, NP    ELECTROLYTE REPLACEMENT NOTE: Nurse to review Serum Potassium and Magnesuim levels and Initiate Electrolyte Replacement Protocol as needed, 1 Each, Other, PRN, Polliard, Blease Augusta, NP    insulin regular (NOVOLIN R, HUMULIN R) 100 Units in 0.9% sodium chloride 100 mL infusion, 0-50 Units/hr, IntraVENous, TITRATE, Grayson Wong NP, Stopped at 04/09/22 1619    glucose chewable tablet 16 g, 4 Tablet, Oral, PRN, Marvin, Grayson Dangelo, NP    glucagon (GLUCAGEN) injection 1 mg, 1 mg, IntraMUSCular, PRN, Grayson Wong, NP    insulin lispro (HUMALOG) injection, , SubCUTAneous, AC&HS, Grayson Wong NP, 3 Units at 04/10/22 1144    dextrose 10 % infusion 0-250 mL, 0-250 mL, IntraVENous, PRN, Polljanny, Marycarmen Holder, NP, Last Rate: 999 mL/hr at 04/07/22 2145, 45 mL at 04/07/22 2145    sucralfate (CARAFATE) tablet 1 g, 1 g, Oral, AC&HS, Polliarangeles, Vernon Anna T, NP, 1 g at 04/12/22 1174    0.45% sodium chloride infusion, 10 mL/hr, IntraVENous, CONTINUOUS, Alana Najera MD, Stopped at 04/10/22 0730    heparin 25,000 units in  ml infusion, 400 Units/hr, IntraVENous, CONTINUOUS, Vernon Wong NP, Last Rate: 4 mL/hr at 04/06/22 0013, 400 Units/hr at 04/06/22 0013    heparin 25,000 units in  ml infusion, 12-25 Units/kg/hr, IntraVENous, TITRATE, Marvin, Marycarmen Holder, NP, Last Rate: 15.6 mL/hr at 04/12/22 0751, 20 Units/kg/hr at 04/12/22 0751    fentaNYL citrate (PF) injection 25 mcg, 25 mcg, IntraVENous, Q2H PRN, Marvin, Marycarmen Holder, NP, 25 mcg at 04/12/22 1049    DOBUTamine (DOBUTREX) 1,000 mg/250 mL (4,000 mcg/mL) infusion, 0-10 mcg/kg/min, IntraVENous, TITRATE, Marycarmen Wong, NP, Last Rate: 6.8 mL/hr at 04/12/22 0751, 6 mcg/kg/min at 04/12/22 0751    PATIENT CARE TEAM:  Patient Care Team:  Ben Ahmadi MD as PCP - General (Internal Medicine)  Ben Ahmadi MD as PCP [place] [facility]  Won Rao MD as Physician (Cardiology)  Vandana Montana MD as Physician (Gastroenterology)  Gianna Casas MD as Physician (Orthopedic Surgery)  Nicholas Ordoñez MD as Physician (Ophthalmology)  Keerthi Irizarry MD (Cardiology)  Mayra Reardon MD (Cardiology)     Thank you for allowing me to participate in this patient's care. Preston Spurling, EARNESTINE   [address] [place] [address], [address]  Phone: [phone]    [facility] ATTENDING ADDENDUM    Patient was seen and examined in person. Data and notes were reviewed. I have discussed and agree with the plan as noted in the NP note above without further additions.     Aimee Wiggins MD PhD  [facility]

## 2022-04-12 NOTE — PROGRESS NOTES
[address] [employer] [employer] in [place] [address] Education:     Met with patient and family, Brianne Vázquez, for LVAD education. Reviewed the following:     LVAD terms and functions- discussed each component of the LVAD system. Patient able to name all component. - Patient able to name battery button, Reinforced silence alarm and Display buttons. Patient able to name Pump running, battery capacity, power lead disconnect, yellow wrench, and red heart/[place] lights. Reinforced Yellow kizzy, low battery and driveline disconnect lights. Alarms- Reviewed \"red heart\" hazard and \"yellow wrench\" advisory alarms. Discussed the importance of calling 911 and subsequently the LVAD 24/7 emergency patient in the event of the \"red heart\" hazard alarm with loss of illuminated green arrows. Discussed contacting LVAD team for \"yellow wrench\" alarms. Back Up Emergency Equipment- Patient able name contents of  back up emergency bag and contents. Discussed bag accompanying patient at all times to ensure back up equipment available in case of emergency, even when patient is in the hospital.    Will check back with patient tomorrow.     Maylin Teran RN  VAD Coordinator

## 2022-04-12 NOTE — PROGRESS NOTES
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 Intra-op course course complicated by bleeding requiring multiple rounds of pRBC's, platelets, FFP, cellsaver, and cryoprecipitate. Intra-op CHEL significant for moderate to severe RV dysfunction. ICU issues include:    1) Cardiogenic shock    General/neuro: The patient is alert and oriented. No acute issues. Respiratory: Encourage mobility, incentive spirometry, respiratory hygiene measures. Cardiac: Status post HeartMate 3 LVAD and aortic valve closure, day 7. Slow dobutamine wean in progress for right heart support. P.o. antihypertensives for afterload reduction. LVAD flows at 4600 RPMs, no device related issues. Further management as per heart failure service. TTE to assess right heart. GI: Regular diet. ID: White count stable at 9 from 8. No need for antibiotic therapy. Renal: Creatinine at 0.82 from 0.7. Bumex as needed. Prophylaxis: Weight-based heparin for therapeutic anticoagulation. Subjective:   Progress Note: 2022      Reason for ICU Admission: Post LVAD    HPI: As Above    Overnight Events:   2022      POD:  Day of Surgery    S/P:   Procedure(s):  REDO STERNOTOMY; RIGHT GROIN CUTDOWN FOR CANNULATION;  INSERTION OF LEFT VENTRICULAR ASSIST DEVICE (HMIII); AORTIC VALVE CLOSURE CHEL BY DR. James Armstrong.     Active Problem List:     Problem List  Date Reviewed: 3/17/2022          Codes Class    S/P ventricular assist device [facility]) ICD-10-CM: X24.965  ICD-9-CM: V45.89 Overview Signed 4/5/2022  2:22 PM by YOLI Amaro     REDO STERNOTOMY   RIGHT GROIN CUTDOWN FOR CANNULATION with access of CVF and CFA  INSERTION OF LEFT VENTRICULAR ASSIST DEVICE (HMIII)  AORTIC VALVE CLOSURE with Park's stitch             HFrEF (heart failure with reduced ejection fraction) ([facility]) ICD-10-CM: I50.20  ICD-9-CM: 428.20         NSTEMI (non-ST elevated myocardial infarction) ([facility]) ICD-10-CM: I21.4  ICD-9-CM: 410.70         Acute on chronic clinical systolic heart failure ([facility]) ICD-10-CM: I50.23  ICD-9-CM: 428.23         Active advance directive on file ICD-10-CM: Z78.9  ICD-9-CM: V49.89         Type 2 diabetes, controlled, with peripheral circulatory disorder ([facility]) ICD-10-CM: E11.51  ICD-9-CM: 250.70, 443.81         CHF, stage C ([facility] ICD-10-CM: I50.9  ICD-9-CM: 428.0         Mixed hyperlipidemia ICD-10-CM: E78.2  ICD-9-CM: 272.2         Proteinuria ICD-10-CM: R80.9  ICD-9-CM: 791.0         BPH without obstruction/lower urinary tract symptoms ICD-10-CM: N40.0  ICD-9-CM: 600.00         Hematuria, gross ICD-10-CM: R31.0  ICD-9-CM: 599.71         Cardiac arrest ([facility] ICD-10-CM: I46.9  ICD-9-CM: 427.5         Insomnia, unspecified ICD-10-CM: G47.00  ICD-9-CM: 780.52         Encounter for long-term (current) use of other medications ICD-10-CM: Z79.899  ICD-9-CM: V58.69         Calculus of kidney ICD-10-CM: N20.0  ICD-9-CM: 592.0         Coronary atherosclerosis of native coronary artery ICD-10-CM: I25.10  ICD-9-CM: 414.01         Benign neoplasm of colon ICD-10-CM: D12.6  ICD-9-CM: 211.3         Unspecified sleep apnea ICD-10-CM: G47.30  ICD-9-CM: 780.57         Reflux esophagitis ICD-10-CM: K21.00  ICD-9-CM: 530.11               Past Medical History:      has a past medical history of CAD (coronary artery disease) ('90 '97, '13 x 2), Calculus of kidney, Colonic polyps, Congestive heart failure, unspecified, Diabetes ([facility] Gastritis, Hypercholesterolemia, and Sleep apnea.     Past Surgical History:      has a past surgical history that includes hx heent; colonoscopy (04/06/2011); endoscopy, colon, diagnostic; hx coronary artery bypass graft (8/22/12); hx pacemaker placement (1/30/13); pr cardiac surg procedure unlist (2012); and colonoscopy (N/A, 3/2/2022). Home Medications:     Prior to Admission medications    Medication Sig Start Date End Date Taking? Authorizing Provider   potassium chloride SR (K-TAB) 20 mEq tablet Take 2 Tablets by mouth two (2) times a day. 3/9/22  Yes Ajit Wong NP   milrinone (PRIMACOR) 20 mg/100 mL (200 mcg/mL) infusion 28.5 mcg/min by IntraVENous route continuous. 3/3/22  Yes Ajit Wong NP   tamsulosin (FLOMAX) 0.4 mg capsule TAKE 1 CAPSULE DAILY 3/2/22  Yes Rachelle Gustafson MD   bumetanide (BUMEX) 2 mg tablet Take 1 Tablet by mouth two (2) times a day. Patient taking differently: Take 2 mg by mouth two (2) times a day. 2 mg in am 1mg in evening 2/11/22  Yes Kiara Hanna NP   prasugreL (Effient) 10 mg tablet Take 10 mg by mouth daily. Yes Provider, Historical   metFORMIN (GLUCOPHAGE) 500 mg tablet Take 1 Tablet by mouth two (2) times daily (with meals). 1/11/22  Yes Rachelle Gustafson MD   ranolazine ER (Ranexa) 500 mg SR tablet Take 1 Tablet by mouth two (2) times a day. 1/11/22  Yes Rachelle Gustafson MD   eplerenone (INSPRA) 50 mg tab tablet Take 1 Tablet by mouth daily. 1/11/22  Yes Rachelle Gustafson MD   isosorbide mononitrate ER (IMDUR) 30 mg tablet Take 1 Tablet by mouth every twelve (12) hours. 1/4/22  Yes Kristopher Mitchell NP   hydrALAZINE (APRESOLINE) 50 mg tablet TAKE 1 TABLET BY MOUTH THREE TIMES DAILY 12/17/21  Yes Nate Stewart NP   dapagliflozin Ala Anne) 10 mg tab tablet Take 1 Tablet by mouth daily.  11/12/21  Yes Arlen Blankenship NP   omeprazole (PRILOSEC) 20 mg capsule TAKE 1 CAPSULE BY MOUTH DAILY FOR INDIGESTION 7/6/21  Yes Kathy PAREDES NP   rosuvastatin (CRESTOR) 10 mg tablet TAKE 1 TABLET NIGHTLY 4/26/21  Yes Silvestre Gustafson MD   aspirin delayed-release 81 mg tablet Take 81 mg by mouth daily. Yes Provider, Historical   benzonatate (TESSALON) 200 mg capsule Take 200 mg by mouth three (3) times daily as needed for Cough. Patient not taking: Reported on 3/7/2022    Provider, Historical   diphenhydrAMINE (Benadryl Allergy) 25 mg tablet Take 25 mg by mouth every six (6) hours as needed. Needed every night d/t picc line causing itching  Patient not taking: Reported on 4/3/2022    Provider, Historical   glucose blood VI test strips (OneTouch Ultra Test) strip USE TO TEST BLOOD SUGAR DAILY 7/20/21   Silvestre Gustafson MD   OneTouch Delica Plus Lancet 33 gauge misc USE TO TEST BLOOD SUGAR DAILY 7/20/21   Silvestre Gustafson MD   lancets misc Use to test blood sugar daily 6/16/20   Silvestre Gustafson MD   acetaminophen (TYLENOL EXTRA STRENGTH) 500 mg tablet Take  by mouth every six (6) hours as needed. Patient not taking: Reported on 3/17/2022    Provider, Historical       Allergies/Social/Family History: Allergies   Allergen Reactions    Oxycodone Anaphylaxis    Pcn [Penicillins] Hives      Social History     Tobacco Use    Smoking status: Never Smoker    Smokeless tobacco: Never Used   Substance Use Topics    Alcohol use: Yes     Alcohol/week: 0.0 standard drinks     Comment:  VERY RARE      Family History   Problem Relation Age of Onset    Heart Disease Mother         MI    Heart Disease Father         CAD & PVD    Lung Disease Father     Cancer Father         lung    Diabetes Maternal Grandmother     Heart Disease Other         CAD    Stroke Sister        Review of Systems:     A comprehensive review of systems was negative except for that written in the HPI.     Objective:   Vital Signs:  Visit Vitals  BP (!) 89/61   Pulse 77   Temp 98.8 °F (37.1 °C)   Resp 24   Ht 6' 1\" (1.854 m)   Wt 85 kg (187 lb 6.3 oz)   SpO2 99%   BMI 24.72 kg/m²    O2 Flow Rate (L/min): 4 l/min O2 Device: None (Room air) Temp (24hrs), Av.3 °F (36.8 °C), Min:98 °F (36.7 °C), Max:98.8 °F (37.1 °C)    CVP (mmHg): 12 mmHg (22 1000)      Intake/Output:     Intake/Output Summary (Last 24 hours) at 2022 1044  Last data filed at 2022 1000  Gross per 24 hour   Intake 1654.95 ml   Output 2945 ml   Net -1290.05 ml       Physical Exam:    General appearance: no distress, appears stated age  Respiratory: Decreased breath sounds b/l  Cardiac: Normal Sinus rhythm   Abdomen: soft, hypoactive sounds  Extremities: warm, perfused. LABS AND  DATA: Personally reviewed  Recent Labs     22   WBC 9.1 8.2   HGB 8.5* 8.8*   HCT 26.1* 27.2*    144*     Recent Labs     220 22   * 133*   K 4.3 4.1    103   CO2 25 22   BUN 18 22*   CREA 0.82 0.79   * 136*   CA 8.5 8.2*   MG 2.0 2.1     Recent Labs     22  0410 22  045    115   TP 5.9* 5.9*   ALB 2.8* 2.6*   GLOB 3.1 3.3     Recent Labs     22  0410 22  1113 22  0455 22   INR 1.7*  --   --  1.3*   PTP 16.8*  --   --  13.0*   APTT 71.7* 59.9*   < > 65.5*    < > = values in this interval not displayed. No results for input(s): PHI, PCO2I, PO2I, FIO2I in the last 72 hours. No results for input(s): CPK, CKMB, TROIQ, BNPP in the last 72 hours. Hemodynamics:   PAP: PAP Systolic: 38 (83/18/38 2816) CO: CO (l/min): 4.7 l/min (22 1400)   Wedge:   CI: CI (l/min/m2): 2.4 l/min/m2 (22 1400)   CVP:  CVP (mmHg): 12 mmHg (22 1000) SVR:       PVR:       Ventilator Settings:  Mode Rate Tidal Volume Pressure FiO2 PEEP   Spontaneous,Pressure support   520 ml  5 cm H2O 40 % 5 cm H20     Peak airway pressure: 18 cm H2O    Minute ventilation: 7.71 l/min        MEDS: Reviewed    Chest X-Ray:  CXR Results  (Last 48 hours)               22 0428  XR CHEST PORT Final result    Impression:  No significant change.            Narrative: INDICATION:  Heart failure. LVAD. EXAM: CXR Portable. FINDINGS: Portable chest shows satisfactory support lines/devices without   significant change since yesterday. There is no apparent pneumothorax. Lungs   show no acute findings. Heart size is stable. There is no overt pulmonary edema. 04/11/22 0503  XR CHEST PORT Final result    Impression:  Cardiomegaly and pulmonary vascular congestion. Narrative:  PORTABLE CHEST RADIOGRAPH/S: 4/11/2022 5:03 AM       INDICATION: Heart failure. COMPARISON: 4/10/2022, 4/9/2022, 4/7/2022. TECHNIQUE: Portable frontal semiupright radiograph/s of the chest.       FINDINGS:    A right IJ sheath, pacemaker/AICD, LVAD, and pleural drains are in place. The   heart is enlarged. There is pulmonary vascular congestion without overt   interstitial edema. The central airways are patent. No pneumothorax and no large   pleural effusion. 04/10/22 1228  XR CHEST PORT Final result    Impression:  Suspect a small left pneumothorax. No other significant change. Narrative:  INDICATION:    portable r/o pneumothorax        EXAMINATION:  AP CHEST, PORTABLE       COMPARISON: Earlier today       FINDINGS: Single AP portable view of the chest at 1219 hours demonstrates   increase in size of the curvilinear lucency in the left superior and mid   hemithorax, compatible with a pneumothorax. There is a left-sided chest tube. LVAD is present. There has been no other significant change. The   cardiomediastinal silhouette is stable. There is no new airspace disease. ECHO:        Multidisciplinary Rounds Completed: Yes    ABCDEF Bundle/Checklist Completed:  Yes    SPECIAL EQUIPMENT  PA Catheter    DISPOSITION  Stay in ICU    CRITICAL CARE CONSULTANT NOTE  I had a face to face encounter with the patient, reviewed and interpreted patient data including clinical events, labs, images, vital signs, I/O's, and examined patient.   I have discussed the case and the plan and management of the patient's care with the consulting services, the bedside nurses and the respiratory therapist.      NOTE OF PERSONAL INVOLVEMENT IN CARE   This patient has a high probability of imminent, clinically significant deterioration, which requires the highest level of preparedness to intervene urgently. I participated in the decision-making and personally managed or directed the management of the following life and organ supporting interventions that required my frequent assessment to treat or prevent imminent deterioration. I personally spent 50 minutes of critical care time. This is time spent at this critically ill patient's bedside actively involved in patient care as well as the coordination of care. This does not include any procedural time which has been billed separately.     Yann Dias DO  Staff Intensivist/Anesthesiologist  [facility]  4/12/2022

## 2022-04-13 ENCOUNTER — APPOINTMENT (OUTPATIENT)
Dept: GENERAL RADIOLOGY | Age: 77
DRG: 001 | End: 2022-04-13
Attending: NURSE PRACTITIONER
Payer: MEDICARE

## 2022-04-13 LAB
ALBUMIN SERPL-MCNC: 2.9 G/DL (ref 3.5–5)
ALBUMIN/GLOB SERPL: 0.9 {RATIO} (ref 1.1–2.2)
ALP SERPL-CCNC: 111 U/L (ref 45–117)
ALT SERPL-CCNC: 60 U/L (ref 12–78)
ANION GAP SERPL CALC-SCNC: 9 MMOL/L (ref 5–15)
APTT PPP: 67.5 SEC (ref 22.1–31)
AST SERPL-CCNC: 139 U/L (ref 15–37)
BILIRUB SERPL-MCNC: 1.3 MG/DL (ref 0.2–1)
BNP SERPL-MCNC: 4448 PG/ML
BUN SERPL-MCNC: 22 MG/DL (ref 6–20)
BUN/CREAT SERPL: 23 (ref 12–20)
CALCIUM SERPL-MCNC: 8.7 MG/DL (ref 8.5–10.1)
CHLORIDE SERPL-SCNC: 98 MMOL/L (ref 97–108)
CO2 SERPL-SCNC: 24 MMOL/L (ref 21–32)
CREAT SERPL-MCNC: 0.96 MG/DL (ref 0.7–1.3)
ERYTHROCYTE [DISTWIDTH] IN BLOOD BY AUTOMATED COUNT: 23.4 % (ref 11.5–14.5)
GLOBULIN SER CALC-MCNC: 3.1 G/DL (ref 2–4)
GLUCOSE BLD STRIP.AUTO-MCNC: 127 MG/DL (ref 65–117)
GLUCOSE BLD STRIP.AUTO-MCNC: 128 MG/DL (ref 65–117)
GLUCOSE BLD STRIP.AUTO-MCNC: 150 MG/DL (ref 65–117)
GLUCOSE BLD STRIP.AUTO-MCNC: 253 MG/DL (ref 65–117)
GLUCOSE SERPL-MCNC: 135 MG/DL (ref 65–100)
HCT VFR BLD AUTO: 26.9 % (ref 36.6–50.3)
HGB BLD-MCNC: 8.7 G/DL (ref 12.1–17)
INR PPP: 2.1 (ref 0.9–1.1)
LACTATE SERPL-SCNC: 1.7 MMOL/L (ref 0.4–2)
LDH SERPL L TO P-CCNC: 248 U/L (ref 85–241)
MAGNESIUM SERPL-MCNC: 1.9 MG/DL (ref 1.6–2.4)
MCH RBC QN AUTO: 26.1 PG (ref 26–34)
MCHC RBC AUTO-ENTMCNC: 32.3 G/DL (ref 30–36.5)
MCV RBC AUTO: 80.8 FL (ref 80–99)
NRBC # BLD: 0.02 K/UL (ref 0–0.01)
NRBC BLD-RTO: 0.2 PER 100 WBC
PLATELET # BLD AUTO: 178 K/UL (ref 150–400)
PMV BLD AUTO: 10.3 FL (ref 8.9–12.9)
POTASSIUM SERPL-SCNC: 4.4 MMOL/L (ref 3.5–5.1)
PROT SERPL-MCNC: 6 G/DL (ref 6.4–8.2)
PROTHROMBIN TIME: 20.5 SEC (ref 9–11.1)
RBC # BLD AUTO: 3.33 M/UL (ref 4.1–5.7)
SERVICE CMNT-IMP: ABNORMAL
SODIUM SERPL-SCNC: 131 MMOL/L (ref 136–145)
THERAPEUTIC RANGE,PTTT: ABNORMAL SECS (ref 58–77)
WBC # BLD AUTO: 8.4 K/UL (ref 4.1–11.1)

## 2022-04-13 PROCEDURE — 74011000250 HC RX REV CODE- 250: Performed by: NURSE PRACTITIONER

## 2022-04-13 PROCEDURE — 71045 X-RAY EXAM CHEST 1 VIEW: CPT

## 2022-04-13 PROCEDURE — 85610 PROTHROMBIN TIME: CPT

## 2022-04-13 PROCEDURE — 74011250637 HC RX REV CODE- 250/637: Performed by: NURSE PRACTITIONER

## 2022-04-13 PROCEDURE — 2709999900 HC NON-CHARGEABLE SUPPLY

## 2022-04-13 PROCEDURE — 85730 THROMBOPLASTIN TIME PARTIAL: CPT

## 2022-04-13 PROCEDURE — 74011250637 HC RX REV CODE- 250/637: Performed by: INTERNAL MEDICINE

## 2022-04-13 PROCEDURE — 97530 THERAPEUTIC ACTIVITIES: CPT

## 2022-04-13 PROCEDURE — 77030037442 HC TY LVAD MGMT SYST CMP-B

## 2022-04-13 PROCEDURE — 85027 COMPLETE CBC AUTOMATED: CPT

## 2022-04-13 PROCEDURE — 83605 ASSAY OF LACTIC ACID: CPT

## 2022-04-13 PROCEDURE — 99291 CRITICAL CARE FIRST HOUR: CPT | Performed by: THORACIC SURGERY (CARDIOTHORACIC VASCULAR SURGERY)

## 2022-04-13 PROCEDURE — 36415 COLL VENOUS BLD VENIPUNCTURE: CPT

## 2022-04-13 PROCEDURE — 93750 INTERROGATION VAD IN PERSON: CPT | Performed by: THORACIC SURGERY (CARDIOTHORACIC VASCULAR SURGERY)

## 2022-04-13 PROCEDURE — 99233 SBSQ HOSP IP/OBS HIGH 50: CPT | Performed by: INTERNAL MEDICINE

## 2022-04-13 PROCEDURE — 97116 GAIT TRAINING THERAPY: CPT

## 2022-04-13 PROCEDURE — 74011250636 HC RX REV CODE- 250/636: Performed by: NURSE PRACTITIONER

## 2022-04-13 PROCEDURE — 83735 ASSAY OF MAGNESIUM: CPT

## 2022-04-13 PROCEDURE — 65610000003 HC RM ICU SURGICAL

## 2022-04-13 PROCEDURE — 80053 COMPREHEN METABOLIC PANEL: CPT

## 2022-04-13 PROCEDURE — 82962 GLUCOSE BLOOD TEST: CPT

## 2022-04-13 PROCEDURE — P9047 ALBUMIN (HUMAN), 25%, 50ML: HCPCS | Performed by: NURSE PRACTITIONER

## 2022-04-13 PROCEDURE — 83615 LACTATE (LD) (LDH) ENZYME: CPT

## 2022-04-13 PROCEDURE — 83880 ASSAY OF NATRIURETIC PEPTIDE: CPT

## 2022-04-13 PROCEDURE — 74011636637 HC RX REV CODE- 636/637: Performed by: NURSE PRACTITIONER

## 2022-04-13 PROCEDURE — 93750 INTERROGATION VAD IN PERSON: CPT | Performed by: INTERNAL MEDICINE

## 2022-04-13 RX ORDER — ALBUMIN HUMAN 250 G/1000ML
12.5 SOLUTION INTRAVENOUS ONCE
Status: DISCONTINUED | OUTPATIENT
Start: 2022-04-14 | End: 2022-04-13

## 2022-04-13 RX ORDER — ALBUMIN HUMAN 50 G/1000ML
12.5 SOLUTION INTRAVENOUS ONCE
Status: COMPLETED | OUTPATIENT
Start: 2022-04-14 | End: 2022-04-14

## 2022-04-13 RX ORDER — LOSARTAN POTASSIUM 25 MG/1
25 TABLET ORAL DAILY
Status: DISCONTINUED | OUTPATIENT
Start: 2022-04-13 | End: 2022-04-14

## 2022-04-13 RX ORDER — WARFARIN 2 MG/1
4 TABLET ORAL ONCE
Status: COMPLETED | OUTPATIENT
Start: 2022-04-13 | End: 2022-04-13

## 2022-04-13 RX ADMIN — SODIUM CHLORIDE 2 G: 9 INJECTION INTRAMUSCULAR; INTRAVENOUS; SUBCUTANEOUS at 14:22

## 2022-04-13 RX ADMIN — ALBUMIN (HUMAN) 12.5 G: 0.25 INJECTION, SOLUTION INTRAVENOUS at 08:37

## 2022-04-13 RX ADMIN — HYDRALAZINE HYDROCHLORIDE 5 MG: 20 INJECTION INTRAMUSCULAR; INTRAVENOUS at 06:39

## 2022-04-13 RX ADMIN — SODIUM CHLORIDE, PRESERVATIVE FREE 10 ML: 5 INJECTION INTRAVENOUS at 21:09

## 2022-04-13 RX ADMIN — BUMETANIDE 1 MG: 0.25 INJECTION, SOLUTION INTRAMUSCULAR; INTRAVENOUS at 08:38

## 2022-04-13 RX ADMIN — KETOROLAC TROMETHAMINE 15 MG: 30 INJECTION, SOLUTION INTRAMUSCULAR; INTRAVENOUS at 22:42

## 2022-04-13 RX ADMIN — AMIODARONE HYDROCHLORIDE 200 MG: 200 TABLET ORAL at 08:38

## 2022-04-13 RX ADMIN — AMIODARONE HYDROCHLORIDE 200 MG: 200 TABLET ORAL at 17:18

## 2022-04-13 RX ADMIN — COLCHICINE 0.6 MG: 0.6 TABLET, FILM COATED ORAL at 08:38

## 2022-04-13 RX ADMIN — Medication 20 UNITS/KG/HR: at 05:57

## 2022-04-13 RX ADMIN — SUCRALFATE 1 G: 1 TABLET ORAL at 17:18

## 2022-04-13 RX ADMIN — HYDRALAZINE HYDROCHLORIDE 75 MG: 50 TABLET, FILM COATED ORAL at 08:37

## 2022-04-13 RX ADMIN — POLYETHYLENE GLYCOL 3350 17 G: 17 POWDER, FOR SOLUTION ORAL at 17:18

## 2022-04-13 RX ADMIN — WARFARIN SODIUM 4 MG: 2 TABLET ORAL at 17:18

## 2022-04-13 RX ADMIN — ALBUMIN (HUMAN) 12.5 G: 0.25 INJECTION, SOLUTION INTRAVENOUS at 17:18

## 2022-04-13 RX ADMIN — BUMETANIDE 1 MG: 0.25 INJECTION, SOLUTION INTRAMUSCULAR; INTRAVENOUS at 15:09

## 2022-04-13 RX ADMIN — HYDRALAZINE HYDROCHLORIDE 75 MG: 50 TABLET, FILM COATED ORAL at 23:06

## 2022-04-13 RX ADMIN — POLYETHYLENE GLYCOL 3350 17 G: 17 POWDER, FOR SOLUTION ORAL at 08:37

## 2022-04-13 RX ADMIN — HYDRALAZINE HYDROCHLORIDE 75 MG: 50 TABLET, FILM COATED ORAL at 17:18

## 2022-04-13 RX ADMIN — MIRTAZAPINE 7.5 MG: 15 TABLET, FILM COATED ORAL at 21:05

## 2022-04-13 RX ADMIN — POTASSIUM CHLORIDE 20 MEQ: 750 TABLET, EXTENDED RELEASE ORAL at 08:37

## 2022-04-13 RX ADMIN — GABAPENTIN 200 MG: 100 CAPSULE ORAL at 21:05

## 2022-04-13 RX ADMIN — POTASSIUM CHLORIDE 20 MEQ: 750 TABLET, EXTENDED RELEASE ORAL at 17:18

## 2022-04-13 RX ADMIN — AMLODIPINE BESYLATE 5 MG: 5 TABLET ORAL at 17:18

## 2022-04-13 RX ADMIN — BUMETANIDE 1 MG: 0.25 INJECTION, SOLUTION INTRAMUSCULAR; INTRAVENOUS at 21:05

## 2022-04-13 RX ADMIN — SENNOSIDES AND DOCUSATE SODIUM 1 TABLET: 50; 8.6 TABLET ORAL at 17:18

## 2022-04-13 RX ADMIN — ASPIRIN 81 MG CHEWABLE TABLET 81 MG: 81 TABLET CHEWABLE at 08:38

## 2022-04-13 RX ADMIN — SODIUM CHLORIDE, PRESERVATIVE FREE 10 ML: 5 INJECTION INTRAVENOUS at 06:33

## 2022-04-13 RX ADMIN — HYDRALAZINE HYDROCHLORIDE 75 MG: 50 TABLET, FILM COATED ORAL at 11:23

## 2022-04-13 RX ADMIN — LOSARTAN POTASSIUM 25 MG: 25 TABLET, FILM COATED ORAL at 14:22

## 2022-04-13 RX ADMIN — AMLODIPINE BESYLATE 5 MG: 5 TABLET ORAL at 08:38

## 2022-04-13 RX ADMIN — KETOROLAC TROMETHAMINE 15 MG: 30 INJECTION, SOLUTION INTRAMUSCULAR; INTRAVENOUS at 14:35

## 2022-04-13 RX ADMIN — SUCRALFATE 1 G: 1 TABLET ORAL at 06:32

## 2022-04-13 RX ADMIN — SODIUM CHLORIDE, PRESERVATIVE FREE 10 ML: 5 INJECTION INTRAVENOUS at 14:23

## 2022-04-13 RX ADMIN — ACETAMINOPHEN 650 MG: 325 TABLET ORAL at 11:23

## 2022-04-13 RX ADMIN — DOBUTAMINE HYDROCHLORIDE 6 MCG/KG/MIN: 400 INJECTION INTRAVENOUS at 05:57

## 2022-04-13 RX ADMIN — Medication 3 UNITS: at 11:30

## 2022-04-13 RX ADMIN — FAMOTIDINE 20 MG: 20 TABLET ORAL at 21:05

## 2022-04-13 RX ADMIN — FAMOTIDINE 20 MG: 20 TABLET ORAL at 08:38

## 2022-04-13 RX ADMIN — SUCRALFATE 1 G: 1 TABLET ORAL at 21:05

## 2022-04-13 RX ADMIN — FENTANYL CITRATE 25 MCG: 0.05 INJECTION, SOLUTION INTRAMUSCULAR; INTRAVENOUS at 06:36

## 2022-04-13 RX ADMIN — GABAPENTIN 200 MG: 100 CAPSULE ORAL at 15:09

## 2022-04-13 RX ADMIN — SENNOSIDES AND DOCUSATE SODIUM 1 TABLET: 50; 8.6 TABLET ORAL at 08:38

## 2022-04-13 RX ADMIN — SUCRALFATE 1 G: 1 TABLET ORAL at 11:23

## 2022-04-13 RX ADMIN — GABAPENTIN 200 MG: 100 CAPSULE ORAL at 08:38

## 2022-04-13 NOTE — PROGRESS NOTES
Contacted by Jaison James NP with heart failure to evaluate mid sternal drainage. Prineo covering the incision was removed. Using sterile gauze, was able to express significant amount of old coagulated blood. There appears to be a tinge of cream color as well. Dr. Teodora Panchal also assessed wound. Agreed to express remaining underlying hematoma, clean area with betadine, and cover with new dry dressing. Instructions given to nursing staff for betadine cleaning once a day for 3 days and then just dry dressing changes. Recommend antibiotic therapy - will defer to heart failure for preference.

## 2022-04-13 NOTE — PROGRESS NOTES
Problem: Mobility Impaired (Adult and Pediatric)  Goal: *Acute Goals and Plan of Care (Insert Text)  Description: FUNCTIONAL STATUS PRIOR TO ADMISSION: Patient was independent and active without use of DME.    HOME SUPPORT PRIOR TO ADMISSION: The patient lived with his wife but did not require assist.    Physical Therapy Goals  Initiated 4/8/2022  1. Patient will move from supine to sit and sit to supine , scoot up and down, and roll side to side in bed with supervision/set-up within 7 days. 2.  Patient will perform sit to/from stand with supervision/set-up within 7 days. 3.  Patient will ambulate 50 feet with least restrictive assistive device and minimal assistance/contact guard assist within 7 days. 4.  Patient will ascend/descend 6 stairs with handrail(s) with minimal assistance/contact guard assist within 14 days. 5.  Patient will perform cardiac exercises per protocol with supervision/set-up within 7 days. 6.  Patient will verbally and functionally recall 3/3 sternal precautions within 7 days. 7.  Patient will perform power exchange for power module to/from battery with minimal assistance within 7 days. Outcome: Progressing Towards Goal    PHYSICAL THERAPY TREATMENT  Patient: Wanda Cloud (2 y.o. male)  Date: 4/13/2022  Diagnosis: HFrEF (heart failure with reduced ejection fraction) ([facility]) [I50.20] <principal problem not specified>  Procedure(s) (LRB):  REDO STERNOTOMY; RIGHT GROIN CUTDOWN FOR CANNULATION;  INSERTION OF LEFT VENTRICULAR ASSIST DEVICE (HMIII); AORTIC VALVE CLOSURE CHEL BY DR. Sobia Edwards. (N/A) 8 Days Post-Op  Precautions: Fall (move in the tube, LVAD)  Chart, physical therapy assessment, plan of care and goals were reviewed. ASSESSMENT  Patient continues with skilled PT services and is progressing towards goals. Pt agreeable to therapy. Reviewed battery life. Pt not consistent with LVAD terminology.  Pt was able to state what is in the emergency bag but not with correct terminology. Educated to have LVAD emergency number in bag also. Pt was able to ambulate with improved steadiness but noted fatigue with distance. Pt had decrease stability with fatigue. Pt requiring v.c for \"move in the tube\" principles. Pt had poor placement on  in his NANDINI hose. Pt educated on other option pt not open to adjust location. Noted during stand no pulling on drive line. Low flow alarm post gait- RN aware and addressing     Current Level of Function Impacting Discharge (mobility/balance): min A     Other factors to consider for discharge: LVAD         PLAN :  Patient continues to benefit from skilled intervention to address the above impairments. Continue treatment per established plan of care. to address goals.     Recommendation for discharge: (in order for the patient to meet his/her long term goals)  Physical therapy at least 2 days/week in the home AND ensure assist and/or supervision for safety with mobility as needed    This discharge recommendation:  Has not yet been discussed the attending provider and/or case management    IF patient discharges home will need the following DME: none       SUBJECTIVE:   Patient stated It is like it is made for it, . regards to NANDINI hose and      OBJECTIVE DATA SUMMARY:   Cardiac diagnosis intervention:  V.c for \"move in the tube\" adherence     Critical Behavior:  Neurologic State: Alert  Orientation Level: Oriented X4  Cognition: Appropriate decision making,Appropriate for age attention/concentration,Appropriate safety awareness,Follows commands     Functional Mobility Training:  Bed Mobility:                    Transfers:  Sit to Stand: Minimum assistance  Stand to Sit: Minimum assistance (decrease control)                             Balance:  Sitting: Intact  Standing: Impaired  Standing - Static: Good  Standing - Dynamic : Fair  Ambulation/Gait Training:  Distance (ft): 180 Feet (ft)  Assistive Device: Gait belt           Gait Abnormalities: Decreased step clearance        Base of Support: Center of gravity altered     Speed/Charlene: Slow  Step Length: Left shortened;Right shortened                Stairs:              VAD power transition  Patient performed switchover from power module to battery. Completed the following tasks:   Independent Verbal cues Physical assist Comments   Don holster vest   x Due to lined    Check batteries  x     Check        Ensure  clipped onto stabilization belt x   Placed in NANDINI hose band   Position batteries in clips x      Disconnect power leads x      Insert power lead into battery x      Eldridge batteries in holster  x      Secure batteries with velcro and clips x  x Due to tightness     Patient performed switchover from battery to power module. Completed the following tasks:   Independent Verbal cues Physical assistance Comments   Remove batteries from holster       Disconnect power leads from battery       Reconnect power leads into power module       Remove batteries from clips       Doff holster vest         Therapeutic Exercises:       Pain Rating:  No complaints     Activity Tolerance:   requires rest breaks      After treatment patient left in no apparent distress:   Sitting in chair and Call bell within reach    COMMUNICATION/COLLABORATION:   The patients plan of care was discussed with: Occupational therapist and Registered nurse.      Elizabet Crow PTA   Time Calculation: 36 mins

## 2022-04-13 NOTE — PROGRESS NOTES
0800 Bedside shift change report given to Cheyenne/Debra RN (oncoming nurse) by Elvi Haskins (offgoing nurse). Report included the following information SBAR, Kardex, OR Summary, Procedure Summary, Intake/Output, MAR, Recent Results and Cardiac Rhythm A fib. INR 2.1- orders given to discontinue heparin gtt.     0900 NP Jaison James informed of sternotomy site drainage. CT team to come and assess. Also informed of low flows. Patient just received scheduled PO medications. Continue to monitor at this time. [address] Dr Teodora Panchal at bedside to assess sternotomy site. Begin daily dressing changes on mid-sternotomy site w betadine swabs daily and sterile gauze and tape changed PRN. Antibiotics to be ordered. 1015 Heart Failure team at bedside. Change timing of hydralazine to 6/12am and pm. Pharmacy notified. 1200 PT at bedside- patient able to walk around the unit with minimal assistance. 1800 LVAD dressing changed by RN. Wife left bedside because she wasn't feeling well. She states that she will do the LVAD dressing change tomorrow (4/14/2022). 1930 Patient arterial MAP between 60-65. CVP is 18. Doppler MAP 68. Heart failure team paged. Awaiting return call from Dr Thanh Wild. Bedside shift change report given to ROBERT/Bj Angeles 3600 (oncoming nurse) by Cheyenne/Debra RN (offgoing nurse). Report included the following information SBAR, Kardex, OR Summary, Procedure Summary, Intake/Output, MAR, Recent Results and Cardiac Rhythm A fib.

## 2022-04-13 NOTE — PROGRESS NOTES
1930: Bedside and Verbal shift change report given to [facility], RN (oncoming nurse) by Abran Lomeli RN (offgoing nurse). Report included the following information SBAR, Intake/Output, MAR, Recent Results and Cardiac Rhythm SA.     2200: Walked into Pt's room; noticed blood on Pt's gown; assessed Pt's sternotomy incision and another spot starting to ooze pinky tinged blood; cleaned w/ betadine and covered w/ sterile gauze. 2335: Pt's MAP 50-52 after giving scheduled PO hydralazine; HF paged; waiting on call back     2350: Pt's MAP 48-50; HF paged for a second time; waiting on call back     2355: Orders received from Dr. Anahy Fitch to give a 5% albumin    0002: Pt's MAP 48-52; Albumin x 1 given per MD orders     0600: Pt's MAPs 62-65; Scheduled PO hydralazine held    0726: Pt's Mag 1.8; replaced per protocol     Shift Summary: No low flows over night; Pt had several low PI events (3.9-4.1); MAPs were 55-65 for majority of the night     0730: Bedside and Verbal shift change report given to Luis Alfredo Garcia RN (oncoming nurse) by [facility], RN (offgoing nurse). Report included the following information SBAR, Intake/Output, MAR, Recent Results and Cardiac Rhythm Afib.

## 2022-04-13 NOTE — DIABETES MGMT
[address] [facility]    CLINICAL NURSE SPECIALIST CONSULT     Initial Presentation   Etta Real is a 69 y.o. male who was admitted 4/3/22 for medical optimization of severe ischemic cardiomyopathy with heart failure with reduced ejection fraction prior to LVAD implantation. HX:   Past Medical History:   Diagnosis Date    CAD (coronary artery disease) '90 '97, '13 x 2    [place] [facility] [place] Calculus of kidney     Colonic polyps     Congestive heart failure, unspecified     Diabetes ([facility] [address]     Gastritis     Hypercholesterolemia     Sleep apnea         INITIAL DX:   HFrEF (heart failure with reduced ejection fraction) ([facility] [address] [I50.20]     Current Treatment     TX: Diuretic adjustment, antibiotics, milrinone    Consulted by Elijah Myrick NP for advanced diabetes nursing assessment and care for:   [x] Inpatient management strategy    Hospital Course   Clinical progress has been uncomplicated. 4/3: Admission  4/5: LVAD implant  4/6: Extubated  4/7: Lactate elevated, TTE- dilated LV cavity, RV with mod dysfunction, Increased LVAD speed. Increased LFTs with hepatic congestion 3  4/8: Blood transfusion   4/9-4/11: progressing post op, HGB stable over weekend s/p PRBC transfusion; PO intake adequate per flow sheets  Diabetes History   Type 2 Diabetes- A1C 6.8%  Ambulatory BG management provided by: Chino Harding MD PCP  Family history positive for diabetes: Maternal Grandmother with DM2       Diabetes-related Medical History  Neurological complications  Peripheral neuropathy  Macrovascular disease  CAD and Myocardial infarction  Other associated conditions     CHF, Sleep Apnea    Diabetes Medication History  Key Antihyperglycemic Medications             metFORMIN (GLUCOPHAGE) 500 mg tablet (Taking) Take 1 Tablet by mouth two (2) times daily (with meals).     dapagliflozin (Farxiga) 10 mg tab tablet (Taking) Take 1 Tablet by mouth daily.            Diabetes self-management practices:   Eating pattern  [x]? Breakfast         Eggs, toast, oatmeal  [x]? Lunch              Sometimes skips or piece of fruit  [x]? Dinner              Chicken/steak with a sweet potato or bowl of soup  [x]? Bedtime           Fruit  [x]? Snacks            Fruit  [x]? Beverages       Water  Physical activity pattern  Limited with HF  Monitoring pattern  Tests 2-3 times weekly  When testing, will see 106-135  Taking medications pattern  [x]? Consistent administration  [x]? Affordable  Social determinants of health impacting diabetes self-management practices   Concerned that you need to know more about how to stay healthy with diabetes  Overall evaluation:               [x]? Achieving A1c target with drug therapy & self-care practices    Subjective   I am doing good\"       On home inotropes since Nov  Objective   Physical exam  General           Normal weight male in acute post-op pain. Conversant and cooperative  Neuro  Alert, oriented, weak, tired  Vital Signs   Visit Vitals  BP (!) 89/61   Pulse 78   Temp 99 °F (37.2 °C)   Resp 25   Ht 6' 1\" (1.854 m)   Wt 86.7 kg (191 lb 2.2 oz)   SpO2 96%   BMI 25.22 kg/m²     Skin  Warm and dry. No acanthosis noted along neckline. No lipohypertrophy or lipoatrophy noted at injection sites   Heart   Regular rate and rhythm.  No murmurs, rubs or gallops  Lungs  Clear to auscultation without rales or rhonchi  Extremities No foot wounds      Laboratory  Recent Labs     04/13/22  0417 04/12/22  0410 04/11/22  0455   * 157* 136*   AGAP 9 6 8   WBC 8.4 9.1 8.2   CREA 0.96 0.82 0.79   GFRNA >60 >60 >60   * 146* 162*   ALT 60 59 62       Factors impacting BG management  Factor Dose Comments   Nutrition:  Standard meals       60 grams/meal        CHF On chronic milrinone Impaired insulin delivery         Blood glucose pattern      Significant diabetes-related events over the past 24-72 hours  A1C 6.8%  LVAD 4/5  Lac 2.6,   Full liq diet  On insulin, dobutamine, epi, heparin, cardene amio  On insulin gtt:   4/5: 21.8 units post-op  4/6: 25.8 units  4/7: 116.9 units  4/8: 15.6 units since MN   4/9-4/11: Transitioned off insulin infusion on 4/9/22.    4/11/22: , required 3 units correctional insulin on 4/10/22.   4/13/22: , no insulin required in past 48h    Assessment and Plan   Nursing Diagnosis Risk for unstable blood glucose pattern   Nursing Intervention Domain 5258 Decision-making Support   Nursing Interventions Examined current inpatient diabetes/blood glucose control   Explored factors facilitating and impeding inpatient management  Explored corrective strategies with patient and responsible inpatient provider   Informed patient of rational for insulin strategy while hospitalized     Evaluation   Garett Stratton is a 69year old gentleman, with controlled Type 2 Diabetes, admitted for medical optimization for severe ischemic cardiomyopathy and heart failure with reduced ejection fraction prior to LVAD implant. A1C on admission was 6.8% and BG was in goal without the need for anti-hyperglycemic agents pre-op. Following LVAD implant, he was started on an insulin gtt which has maintained glucose in goal but with elevated insulin rates in the setting of stress hyperglycemia, increased hepatic congestion and acidosis. Insulin sensitivity is much improved today and insulin rates decreased. Reasonable to transition off insulin gtt today. Once off insulin gtt, please target an inpatient -180mg/dl. 4/9-4/11/22: Progressing post op, BG remains in target, 140-180 post op- and requiring minimal amounts of correction -  4/13/22: FBG trends remain in target 140-180 consistently with post prandial hyperglycemia noted in evenings. Has not required insulin in past 48hrs. Will follow this patient peripherally -  Recommendations   1.  CONTINUE correctional insulin for BG >140. IF BG start to trend >200 consistently, start low dose basal insulin at 0.1u/kg. Billing Code(s)   [x] 71220    Before making these care recommendations, I personally reviewed the hospitalization record, including notes, laboratory & diagnostic data and current medications, and examined the patient at the bedside (circumstances permitting) before making care recommendations. More than fifty (50) percent of the time was spent in patient counseling and/or care coordination.   Total minutes: [id number]  Diabetes Clinical Nurse Specialist  [facility]  Access via [place]

## 2022-04-13 NOTE — PROGRESS NOTES
[facility] in [place], [place]  Inpatient Progress Note      Patient name: Nette Peterson  Patient : 1945  Patient MRN: [id number]  Consulting MD: Tom Morales MD  Date of service: 22    REASON FOR REFERRAL:  Management of LVAD     PLAN OF CARE:   · 68 y/o with severe ischemic cardiomyopathy, LVEF 15-20%; stage D, NYHA class IV symptoms; s/p LVAD implant with HM3 as DT due to age (22)  · Postoperative course:  ? Bleeding intra-op; resolved  ? RV dysfunction requiring prolonged inotropic support with dobutamine  ? Superficial soft tissue infection at sternotomy; antibiotics resumed   ?  Routine postoperative care      RECOMMENDATIONS:   POD#8 from LVAD implant  TTE shows severe TR but improved RV function; continue to medical optimize   Continue speed 4800rpms, low speed 4400  Continue dobutamine 6 mcg/kg/min, keep this dose and no plans to wean   Continue hydralazine 75mg PO q6h (timing adjusted)   Continue decreased amiodarone to 200 mg PO twice daily due to nausea, vomiting   Continue amlodipine 5 mg PO BID   Begin losartan 25 mg PO daily   Continue Bumex 1 mg IV TID + 25% albumin BID to augment diuresis   Continue Bumex 1mg IV as needed for CVP > 12 (goal 12-14) to prevent LV suckdown  Begin cefepime q12h for soft tissue infection at sternotomy site; examined by Dr. Mare godoy   D/C heparin   Continue warfarin 4 mg, goal INR 2-3   ASA 81mg daily  Continue colchicine for pericarditis   Continue gabapentin to 200 mg PO TID   Toradol q6h PRN and before PT/OT; OK with Dr. Mare Hardy, renal function stable  Continue fentanyl and Lidocaine patch  D/w pharmacy alternative pain management options; consider tramadol trial if no relief from gabapentin   Driveline dressing changes daily for now until 5/3/22  Continue bowel regimen, BM /  Low dose Remeron  Pulmonary toilet, monitor CT output   SSI   CPAP QHS  Advanced care plan forms on file   Strict I/O, daily weights, Na+ restricted diet   Continue VAD education      IMPRESSION:  S/p LVAD implant as DT on 4/5/22 with Dr. Mare Hardy   chronic systolic heart failure  · Stage D, NYHA class IV symptoms improved to class II on inotropes  · Non-ischemic cardiomyopathy, LVEF 20% with LVEDD 6.2  · RV dysfunction, TAPSE 1.22 (prelimnary read)  · TBili 1.5 likely from cardiac congestion  At risk of sudden cardiac death  · Recent cardiac arrest   · S/p ICD (1/2013, [address] followed by [address] [place] [address]); New implant on 10/21/2021 [employer]  Coronary artery disease  · S/p multiple interventions  · S/p 4V CABG (8/2012)  · LHC (7/2019) severe stenosis of LIMA to LAD anastomosis site, SVG graft to OM is occluded, SVG graft to RCA 40-50%, LAD occluded proximally, severe proximal LCx, RCA is the long . · PET (6/2019) EF 24% with anterior lateral and inferior reversible defect  Cardiac risk factors  · HTN  · HL  · DM2  · SRUTHI on CPAP  · MIld carotid stenosis  Anemia, microcytic  · Iron deficiency  DVT with filter  Pulmonary nodules   Dysphagia      Interval Events:  Drainage from sternotomy   Pain management improved  MAP 80s-90s, occ low flow alarms      CARDIAC IMAGING:  Echo (4/8/22)    Left Ventricle: Left ventricle size is normal. Moderately increased wall thickness. Findings consistent with concentric remodeling. Severe global hypokinesis present. Severely reduced left ventricular systolic function with a visually estimated EF of 10 -15%. 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   Aortic Valve: Moderate sclerosis of the aortic valve cusp.   Mitral Valve: Moderately thickened leaflet. Mild transvalvular regurgitation.   Tricuspid Valve: Severe transvalvular regurgitation.   Right Atrium: Right atrium is severely dilated.     Echo (3/2/22)   · LV 10-15%  · Mod-severe MR      Echo (11/23/21):  · LV 15-20%.     · Mod-severe, MR, mod-TR     Echo (5/20/21)  · LV: Estimated LVEF is 20 - 25%. Normal wall thickness. Mildly dilated left ventricle. Severely and globally reduced systolic function. Severe (grade 4) left ventricular diastolic dysfunction. · LA: Severely dilated left atrium. · RA: Severely dilated right atrium. · MV: Moderate mitral valve regurgitation is present. · TV: Moderate tricuspid valve regurgitation is present. · AO: Mild aortic root dilatation. · RV: Pacer/ICD present. · LVED 6.2cm     EKG (5/20/21) SB with 1degree AV block, QRS 116ms     LHC (5/24/21) Native Coronaries: LM - moderate to severe distal disease 50%. % prox occluded (), heavily calcified, LCx: 80% proximal stenosis. OM1 is  (seen filling faintly via collaterals). OM2 99% stenosis. RCA: 100% proximal occluded.  LIMA to LAD: patent, supplies only a diagonal branch (probably D2). The LAD distal to the bifurcation is 100% occluded () and fills via right to left collaterals off the SVG to RCA. SVG to R-PDA: patent with moderate diffuse irregularities but no obstructive disease in the graft.    No appealing interventional targets.      NST as above     ICD Interrogation (11/12/2021) [employer] VVI, thoracic impedence trending up, no events, normal device function and good battery  ICD interrogation (5/12/21) [employer] single lead, no events, normal device function and good battery     HEMODYNAMICS:  RHC 5/24/21 CI 1.97, PA 33/9/17, RA 1, PCWP 6- off milrinone   CPEST not done     Patient underwent a 6 minute walk test 11/12/2021     6 Min Walk Report 11/12/2021 7/6/2021/6/2021 5/20/2021   (PRE) HR 88 98 74   (PRE) O2 Sat 100 - 99   (POST)  - 81   (POST) O2 Sat 99 98% on Ra 99   Distance in Meters 386.58 - 1158. 24       OTHER IMAGING:  CXR (6/17/21) clear  CT 5/21/21 1. Irregular pulmonary nodule in the left upper lobe measuring 2 cm, suspicious for primary pulmonary malignancy. 2. Additional 6 mm left upper lobe pulmonary nodule. 3. Severe calcific atherosclerosis of the coronary arteries and abdominal aorta. No aneurysm. 4. Cardiomegaly. 5. No acute abnormality within the chest, abdomen, or pelvis.      HISTORY OF PRESENT ILLNESS:  Briefly, Etta Real is a 69 y.o. male with h/o HTN, HL, DM2, SRUTHI on CPAP, chronic systolic heart failure, stage D, NYHA class IV symptoms, non-ischemic cardiomyopathy, LVEF 20% with LVEDD 6.2, RV dysfunciton, TAPSE 1.22, TBili 1.5 likely from cardiac congestion, recent cardiac arrest s/p ICD (1/2013, [facility] followed by [facility]), coronary artery disease s/p multiple interventions s/p 4V CABG (8/2012), LHC (7/2019) severe stenosis of LIMA to LAD anastomosis site, SVG graft to OM is occluded, SVG graft to RCA 40-50%, LAD occluded proximally, severe proximal LCx, RCA is the long . PET (6/2019) EF 24% with anterior lateral and inferior reversible defect. Anemia, microcytic with iron deficiency, DVT with filter.     Patient was referred to [facility] by Dr. Regulo Thomson for evaluation of his candidacy for advanced therapies.     Patient agreed to inpatient initiation of palliative infusion of chronic inotropes and evaluation for LVAD-DT. Patient was admitted 5/2-5/25/21. Patient was started on milrinone infusion and had first part of LVAD evaluation completed. Right and left heart cath on 5/24/21 that showed severe diffuse native vessel CAD, with patent grafts (LIMA to D2, SVG to RCA).  Antiplatelet therapy was held during hospitalization and after discharge due to anticipated pulmonary nodule biopsy, bone marrow biopsy and EGD/C-Scope as part of LVAD workup.     Patient was readmitted 5/26-5/28/21 with hypertension, headache and chest pain, his troponin peaked at 10; he was shortly bridged with heparin, otherwise had normal EKG and chest CT negative for PE. Cardiology and AHF service was consulted and patient was discharged home after troponin came down.      Patient has now completed radiation treatment for adenocarcinoma of the lung. Hem-onc has cleared patient for VAD implant with 90% 2 year prognosis.      He was most recently admitted for elective pre-op EGD and colonoscopy which he tolerated well; path negative for malignancy.      He [facility] to [facility] on 4/3 for LVAD implant. LVAD INTERROGATION:  Device interrogated in person  Device function normal, normal flow, no events  LVAD   Pump Speed (RPM): 4800  Pump Flow (LPM): 3.5  MAP: 86 (L brachial doppler)  PI (Pulsitility Index): 6.5  Power: 3.1   Test: No  Back Up  at Bedside & Labeled: Yes  Power Module Test: No  Driveline Site Care: No  Driveline Dressing: Clean, Dry, and Intact  MAP in Therapeutic Range (Outpatient): Yes  Testing  Alarms Reviewed: Yes  Back up SC speed: 4800  Back up Low Speed Limit: 4400  Emergency Equipment with Patient?: Yes  Emergency procedures reviewed?: Yes  Drive line site inspected?: No  Drive line intergrity inspected?: No  Drive line dressing changed?: No    PHYSICAL EXAM:  Visit Vitals  /78 (BP 1 Location: Right upper arm, BP Patient Position: At rest)   Pulse 71   Temp 98.2 °F (36.8 °C)   Resp 23   Ht 6' 1\" (1.854 m)   Wt 191 lb 2.2 oz (86.7 kg)   SpO2 99%   BMI 25.22 kg/m²     General: Patient is well developed, well-nourished in no acute distress  HEENT: Normocephalic and atraumatic. No scleral icterus. Pupils are equal, round and reactive to light and accomodation. No conjunctival injection. Oropharynx is clear. Neck: Supple. No evidence of thyroid enlargements or lymphadenopathy. JVD: Cannot be appreciated   Lungs: Breath sounds are equal and clear bilaterally. No wheezes, rhonchi, or rales. Heart: VAD hum  Abdomen: Soft, no mass or tenderness. No organomegaly or hernia. Bowel sounds present. Genitourinary and rectal: deferred  Extremities: No cyanosis, clubbing. 1+ pitting LE edema. Neurologic: No focal sensory or motor deficits are noted. Grossly intact.   Psychiatric: Awake, alert an doriented x 3. Appropriate mood and affect. Skin: Warm, dry and well perfused. No lesions, nodules or rashes are noted. REVIEW OF SYSTEMS:  General: Denies fever, night sweats. Ear, nose and throat: Denies difficulty hearing, sinus problems, runny nose, post-nasal drip, ringing in ears, mouth sores, loose teeth, ear pain, nosebleeds, sore throate, facial pain or numbess  Cardiovascular: see above in the interval history  Respiratory: Denies cough, wheezing, sputum production, hemoptysis. Gastrointestinal: + nausea, vomiting this AM.   Kidney and bladder: Denies painful urination, frequent urination, urgency, prostate problems and impotence  Musculoskeletal: Denies joint pain, muscle weakness  Skin and hair: Denies change in existing skin lesions, hair loss or increase    PAST MEDICAL HISTORY:  Past Medical History:   Diagnosis Date    CAD (coronary artery disease) '90 '97, '13 x 2    [place], code [place] in [place] at [facility] [place]    Calculus of kidney     Colonic polyps     Congestive heart failure, unspecified     Diabetes ([facility] [address]     Gastritis     Hypercholesterolemia     Sleep apnea        PAST SURGICAL HISTORY:  Past Surgical History:   Procedure Laterality Date    COLONOSCOPY  04/06/2011    16, due 21    COLONOSCOPY N/A 3/2/2022    COLONOSCOPY    :- performed by Grey Luu [facility] at [facility] ENDOSCOPY    ENDOSCOPY, COLON, DIAGNOSTIC      11, due 16    HX CORONARY ARTERY BYPASS GRAFT  8/22/12    x 4 vessel by MISSY Ramirez    HX HEENT      LASIK    HX PACEMAKER PLACEMENT  1/30/13    [place] CARDIAC SURG PROCEDURE UNLIST  2012    x 4 vessels       FAMILY HISTORY:  Family History   Problem Relation Age of Onset    Heart Disease Mother         MI    Heart Disease Father         CAD & PVD    Lung Disease Father     Cancer Father         lung    Diabetes Maternal Grandmother     Heart Disease Other         CAD    Stroke Sister        SOCIAL HISTORY:  Social History     Socioeconomic History    Marital status:    Tobacco Use    Smoking status: Never Smoker    Smokeless tobacco: Never Used   Vaping Use    Vaping Use: Never used   Substance and Sexual Activity    Alcohol use: Yes     Alcohol/week: 0.0 standard drinks     Comment:  VERY RARE    Drug use: No       LABORATORY RESULTS:     Labs Latest Ref Rng & Units 4/13/2022 4/12/2022 4/11/2022 4/10/2022 4/9/2022 4/8/2022 4/8/2022   WBC 4.1 - 11.1 K/uL 8.4 9.1 8.2 7.9 7.7 - 8.6   RBC 4.10 - 5.70 M/uL 3.33(L) 3.23(L) 3.39(L) 3.31(L) 3.16(L) - 2.91(L)   Hemoglobin 12.1 - 17.0 g/dL 8.7(L) 8.5(L) 8.8(L) 8.6(L) 8. 3(L) 8.5(L) 7. 5(L)   Hematocrit 36.6 - 50.3 % 26. 9(L) 26. 1(L) 27. 2(L) 26. 1(L) 24. 9(L) 25. 7(L) 23. 0(L)   MCV 80.0 - 99.0 FL 80.8 80.8 80.2 78. 9(L) 78. 8(L) - 79. 0(L)   Platelets 682 - 781 K/uL 178 161 144(L) 125(L) 104(L) - 99(L)   Lymphocytes 12 - 49 % - - - - - - -   Monocytes 5 - 13 % - - - - - - -   Eosinophils 0 - 7 % - - - - - - -   Basophils 0 - 1 % - - - - - - -   Bands 0 - 6 % - - - - - - -   Albumin 3.5 - 5.0 g/dL 2. 9(L) 2. 8(L) 2. 6(L) 3. 1(L) 3. 1(L) - 3. 0(L)   Calcium 8.5 - 10.1 MG/DL 8.7 8.5 8.2(L) 8. 1(L) 8. 3(L) - 8. 3(L)   Glucose 65 - 100 mg/dL 135(H) 157(H) 136(H) 135(H) 107(H) - 91   BUN 6 - 20 MG/DL 22(H) 18 22(H) 29(H) 30(H) - 25(H)   Creatinine 0.70 - 1.30 MG/DL 0.96 0.82 0.79 0.92 0.87 - 0.91   Sodium 136 - 145 mmol/L 131(L) 132(L) 133(L) 132(L) 132(L) - 133(L)   Potassium 3.5 - 5.1 mmol/L 4.4 4.3 4.1 3.8 3.8 - 4.0   TSH 0.36 - 3.74 uIU/mL - - - 2.26 - - -   LDH 85 - 241 U/L 248(H) - 276(H) 319(H) 289(H) - 306(H)   Some recent data might be hidden     Lab Results   Component Value Date/Time    TSH 2.26 04/10/2022 03:00 AM    TSH 1.68 03/01/2022 11:50 AM    TSH 1.47 05/26/2021 10:44 PM    TSH 1.97 05/20/2021 04:28 PM    TSH 1.41 02/09/2021 03:05 PM    TSH 1.740 08/14/2018 09:58 AM    TSH 1.710 10/18/2017 12:00 AM       ALLERGY:  Allergies   Allergen Reactions    Oxycodone Anaphylaxis    Pcn [Penicillins] Hives        CURRENT MEDICATIONS:    Current Facility-Administered Medications:     cefepime (MAXIPIME) 2 g in 0.9% sodium chloride 10 mL IV syringe, 2 g, IntraVENous, Q12H, Polliard, Bravo Daily T, NP, 2 g at 04/13/22 1422    losartan (COZAAR) tablet 25 mg, 25 mg, Oral, DAILY, Polliard, Bravo Daily T, NP, 25 mg at 04/13/22 1422    amiodarone (CORDARONE) tablet 200 mg, 200 mg, Oral, BID, Polliard, Bravo Daily T, NP, 200 mg at 04/13/22 4107    colchicine tablet 0.6 mg, 0.6 mg, Oral, DAILY, Polliard, Bravo Daily T, NP, 0.6 mg at 04/13/22 0838    alteplase (CATHFLO) 1 mg in sterile water (preservative free) 1 mL injection, 1 mg, InterCATHeter, PRN, Jf Hugo MD    bisacodyL (DULCOLAX) suppository 10 mg, 10 mg, Rectal, DAILY PRN, Judy García MD, 10 mg at 04/12/22 1533    bumetanide (BUMEX) injection 1 mg, 1 mg, IntraVENous, TID, Pat Wong NP, 1 mg at 04/13/22 1509    gabapentin (NEURONTIN) capsule 200 mg, 200 mg, Oral, TID, Polliard, Bravo Daily T, NP, 200 mg at 04/13/22 1509    ketorolac (TORADOL) injection 15 mg, 15 mg, IntraVENous, Q6H PRN, Millie Wong T, NP, 15 mg at 04/13/22 1435    albumin human 25% (BUMINATE) solution 12.5 g, 12.5 g, IntraVENous, BID, Polliard, Bravo Daily T, NP, 12.5 g at 04/13/22 0837    amLODIPine (NORVASC) tablet 5 mg, 5 mg, Oral, BID, Polliard, Bravo Daily T, NP, 5 mg at 04/13/22 0838    polyethylene glycol (MIRALAX) packet 17 g, 17 g, Oral, BID, Judy García MD, 17 g at 04/13/22 0837    senna-docusate (PERICOLACE) 8.6-50 mg per tablet 1 Tablet, 1 Tablet, Oral, BID, Judy García MD, 1 Tablet at 04/13/22 6662    potassium chloride SR (KLOR-CON 10) tablet 20 mEq, 20 mEq, Oral, BID, Judy García MD, 20 mEq at 04/13/22 0837    hydrALAZINE (APRESOLINE) tablet 75 mg, 75 mg, Oral, QID, Judy García MD, 75 mg at 04/13/22 1123    famotidine (PEPCID) tablet 20 mg, 20 mg, Oral, Q12H, Judy García MD, 20 mg at 04/13/22 0838    bumetanide (BUMEX) injection 1 mg, 1 mg, IntraVENous, Q6H PRN, Michael Holder, Lee Millan MD, 1 mg at 04/10/22 1234    mirtazapine (REMERON) tablet 7.5 mg, 7.5 mg, Oral, QHS, Terry, Danica B, NP, 7.5 mg at 04/12/22 2112    lidocaine 4 % patch 1 Patch, 1 Patch, TransDERmal, Q24H, Terry, Danica B, NP, 1 Patch at 04/13/22 1423    Warfarin NP/MD dosing, , Other, PRN, Terry, Danica B, NP    melatonin tablet 3 mg, 3 mg, Oral, QHS PRN, Aileen Oliveira MD, 3 mg at 04/10/22 0251    hydrALAZINE (APRESOLINE) 20 mg/mL injection 10 mg, 10 mg, IntraVENous, Q6H PRN, Galileo Fulton B, NP, 10 mg at 04/10/22 0245    aspirin chewable tablet 81 mg, 81 mg, Oral, DAILY, Galileo Fulton B, NP, 81 mg at 04/13/22 7144    hydrALAZINE (APRESOLINE) 20 mg/mL injection 5 mg, 5 mg, IntraVENous, Q6H PRN, Galileo Fulton B, NP, 5 mg at 04/13/22 6148    fentaNYL (DURAGESIC) 12 mcg/hr patch 1 Patch, 1 Patch, TransDERmal, Q72H, Galileo JACOBSON, NP, 1 Patch at 04/10/22 2310    sodium chloride (NS) flush 5-40 mL, 5-40 mL, IntraVENous, Q8H, Millie Wong NP, 10 mL at 04/13/22 1423    sodium chloride (NS) flush 5-40 mL, 5-40 mL, IntraVENous, PRN, Nena Wong NP    0.9% sodium chloride infusion, 9 mL/hr, IntraVENous, CONTINUOUS, Mehran Wong NP, Last Rate: 6 mL/hr at 04/11/22 0801, 6 mL/hr at 04/11/22 0801    acetaminophen (TYLENOL) tablet 650 mg, 650 mg, Oral, Q6H PRN, Millie Wong NP, 650 mg at 04/13/22 1123    naloxone (NARCAN) injection 0.4 mg, 0.4 mg, IntraVENous, PRN, Nena Wong, NP    ondansetron (ZOFRAN) injection 4 mg, 4 mg, IntraVENous, Q4H PRN, Mehran Wong NP, 4 mg at 04/11/22 2139    albuterol-ipratropium (DUO-NEB) 2.5 MG-0.5 MG/3 ML, 3 mL, Nebulization, Q6H PRN, Nena Wong, NP    midazolam (VERSED) injection 1 mg, 1 mg, IntraVENous, Q1H PRN, Nena Wong, NP    ELECTROLYTE REPLACEMENT NOTE: Nurse to review Serum Potassium and Magnesuim levels and Initiate Electrolyte Replacement Protocol as needed, 1 Each, Other, PRN, Irving Elliott T, NP    insulin regular (NOVOLIN R, HUMULIN R) 100 Units in 0.9% sodium chloride 100 mL infusion, 0-50 Units/hr, IntraVENous, TITRATE, Noemi Wong NP, Stopped at 04/09/22 1619    glucose chewable tablet 16 g, 4 Tablet, Oral, PRN, Noemi Wong NP    glucagon (GLUCAGEN) injection 1 mg, 1 mg, IntraMUSCular, PRN, Noemi Wong NP    insulin lispro (HUMALOG) injection, , SubCUTAneous, AC&HS, Marvin, Shubham España T, NP, 3 Units at 04/13/22 1130    dextrose 10 % infusion 0-250 mL, 0-250 mL, IntraVENous, PRN, Noemi Wong NP, Last Rate: 999 mL/hr at 04/07/22 2145, 45 mL at 04/07/22 2145    sucralfate (CARAFATE) tablet 1 g, 1 g, Oral, AC&HS, Noemi Wong NP, 1 g at 04/13/22 1123    0.45% sodium chloride infusion, 10 mL/hr, IntraVENous, CONTINUOUS, Fiser, Anthony Herron MD, Stopped at 04/10/22 0730    fentaNYL citrate (PF) injection 25 mcg, 25 mcg, IntraVENous, Q2H PRN, Noemi Wong NP, 25 mcg at 04/13/22 0636    DOBUTamine (DOBUTREX) 1,000 mg/250 mL (4,000 mcg/mL) infusion, 0-10 mcg/kg/min, IntraVENous, TITRATE, Noemi Wong NP, Last Rate: 6.8 mL/hr at 04/13/22 0754, 6 mcg/kg/min at 04/13/22 0754    PATIENT CARE TEAM:  Patient Care Team:  Angelique Solano MD as PCP - General (Internal Medicine)  Angelique Solano MD as PCP - [facility]  Shahid Turner MD as Physician (Cardiology)  Akila Church MD as Physician (Gastroenterology)  Yue Dial MD as Physician (Orthopedic Surgery)  William Mcmahon MD as Physician (Ophthalmology)  Isadora Pineda MD (Cardiology)  Karna Sandifer, MD (Cardiology)      Thank you for allowing me to participate in this patient's care. Jeison Savage NP   [address] [place] [address], [address]  Phone: [phone]    [facility] ATTENDING ADDENDUM    Patient was seen and examined in person. Data and notes were reviewed.  I have discussed and agree with the plan as noted in the NP note above without further additions.     Gerry Mares MD PhD  Jerry Landau

## 2022-04-13 NOTE — PROGRESS NOTES
Critical Care Note      Cardiac surgery was called for the evaluation and management of: NYHA class IV A/C systolic heart failure, inotrope dependence, right ventricular dysfunction      The critical nature of the patient's condition includes the following: The patient is at imminent risk of death from NYHA class IV A/C systolic heart failure, inotrope dependence, and right ventricular dysfunction. Carlos Blank is at imminent risk of needing escalation of MCS and right sided MCS     Critical care diagnoses that are being treated:  NYHA class IV A/C systolic heart failure / inotrope dependence  Right ventricular dysfunction      HPI: Patient is a 68 yo with severe NYHA class IV A/C systolic heart failure, inotrope dependence, and right ventricular dysfunction.  The patient is at imminent risk of death from NYHA class IV A/C systolic heart failure, inotrope dependence, and right ventricular dysfunction. Carlos Blank is at imminent risk of needing escalation of MCS and right sided MCS.  Asked to evaluate for permanent LVAD     NYHA class IV A/C systolic heart failure / Cardiogenic shock   Continues on dual inotropic support (Dobutamine and Milrinone)  NT pro-BNP 5K  Pulmonary edema on CXR  CVP 10-15, PA 30/20's  Cardiac index 2's  MAPs 70-80s  Low flows improving   Discussed with HF team      Right ventricular dysfunction   Continues on dual inotropes  LFTs 100's  Likely some hepatic congestion          Intake/Output Summary (Last 24 hours) at 4/13/2022 1256  Last data filed at 4/13/2022 1200  Gross per 24 hour   Intake 887.6 ml   Output 2393 ml   Net -1505.4 ml      Visit Vitals  /78 (BP 1 Location: Right upper arm, BP Patient Position: At rest)   Pulse 73   Temp 98.7 °F (37.1 °C)   Resp 24   Ht 6' 1\" (1.854 m)   Wt 191 lb 2.2 oz (86.7 kg)   SpO2 97%   BMI 25.22 kg/m²      CXR Results  (Last 48 hours)               04/13/22 0451  XR CHEST PORT Final result    Impression:  Slight improvement in minimal edema. Otherwise no change. Narrative: Indication: Heart failure, LVAD       Comparison to 4/12/2022. Portable exam obtained at 429 demonstrates slight   improvement in the minimal edema compared to the prior exam. LVAD and   right-sided chest tube are unchanged in position. 04/12/22 0428  XR CHEST PORT Final result    Impression:  No significant change. Narrative:  INDICATION:  Heart failure. LVAD. EXAM: CXR Portable. FINDINGS: Portable chest shows satisfactory support lines/devices without   significant change since yesterday. There is no apparent pneumothorax. Lungs   show no acute findings. Heart size is stable. There is no overt pulmonary edema. LVAD   Pump Speed (RPM): 4850  Pump Flow (LPM): 3.1  MAP: 100 (R brachial doppler)  PI (Pulsitility Index): 8.2  Power: 3.2   Test: No  Back Up  at Bedside & Labeled: Yes  Power Module Test: No  Driveline Site Care: No  Driveline Dressing: Clean, Dry, and Intact  MAP in Therapeutic Range (Outpatient): Yes  Testing  Alarms Reviewed: Yes  Back up SC speed: 4800  Back up Low Speed Limit: 4400  Emergency Equipment with Patient?: Yes  Emergency procedures reviewed?: Yes  Drive line site inspected?: No  Drive line intergrity inspected?: No  Drive line dressing changed?: No      Total critical care time - 30 minutes (CPT 13473)      I personally spent the above critical care time. This is time spent at this critically ill patient's bedside / unit / floor actively involved in patient care as well as the coordination of care. This does not include any procedural time which has been billed separately. This does not include any NP/PA patient care time. I had a face to face encounter with the patient, reviewed and interpreted patient data including clinical events, labs, images, vital signs, I/O's, and examined patient.   I have discussed the case and the plan and management of the patient's care with the consulting services and bedside nurses. This patient has a high probability of imminent, clinically significant deterioration, which requires the highest level of preparedness to intervene urgently. I participated in the decision-making and personally managed or directed the management of life and organ supporting interventions to treat or prevent imminent deterioration. This patient has a critical illness or injury that is acutely impairing one or more vital organ systems such that there is a high probability of imminent or life threatening deterioration in the patient's condition. This patient requires high complexity medical decision making to assess, manipulate, and support vital organ system failure. After stabilization, this patient still requires management to prevent further life / organ threatening deterioration.

## 2022-04-13 NOTE — PROGRESS NOTES
Critical Care Note      Cardiac surgery was called for the evaluation and management of: NYHA class IV A/C systolic heart failure, inotrope dependence, right ventricular dysfunction      The critical nature of the patient's condition includes the following: The patient is at imminent risk of death from NYHA class IV A/C systolic heart failure, inotrope dependence, and right ventricular dysfunction. Nando Mcintyre is at imminent risk of needing escalation of MCS and right sided MCS     Critical care diagnoses that are being treated:  NYHA class IV A/C systolic heart failure / inotrope dependence  Right ventricular dysfunction      HPI: Patient is a 68 yo with severe NYHA class IV A/C systolic heart failure, inotrope dependence, and right ventricular dysfunction.  The patient is at imminent risk of death from NYHA class IV A/C systolic heart failure, inotrope dependence, and right ventricular dysfunction.  He is at imminent risk of needing escalation of MCS and right sided MCS.  Asked to evaluate for permanent LVAD     NYHA class IV A/C systolic heart failure / Cardiogenic shock   Continues on Dobutamine  NT pro-BNP 4K  Some pulmonary edema on CXR  CVP 10-15, PA 30/20's  Cardiac index 2's  MAPs 70-80s  Low flows less overnight  Discussed with HF team      Right ventricular dysfunction   Continues on Dobutamine   LFTs 100's  Likely some hepatic congestion        Intake/Output Summary (Last 24 hours) at 4/13/2022 1304  Last data filed at 4/13/2022 1300  Gross per 24 hour   Intake 843.6 ml   Output 2560 ml   Net -1716.4 ml      Visit Vitals  /78 (BP 1 Location: Right upper arm, BP Patient Position: At rest)   Pulse 78   Temp 98.7 °F (37.1 °C)   Resp 20   Ht 6' 1\" (1.854 m)   Wt 191 lb 2.2 oz (86.7 kg)   SpO2 97%   BMI 25.22 kg/m²      CXR Results  (Last 48 hours)               04/13/22 0451  XR CHEST PORT Final result    Impression:  Slight improvement in minimal edema. Otherwise no change. Narrative:   Indication: Heart failure, LVAD       Comparison to 4/12/2022. Portable exam obtained at 429 demonstrates slight   improvement in the minimal edema compared to the prior exam. LVAD and   right-sided chest tube are unchanged in position. 04/12/22 0428  XR CHEST PORT Final result    Impression:  No significant change. Narrative:  INDICATION:  Heart failure. LVAD. EXAM: CXR Portable. FINDINGS: Portable chest shows satisfactory support lines/devices without   significant change since yesterday. There is no apparent pneumothorax. Lungs   show no acute findings. Heart size is stable. There is no overt pulmonary edema. LVAD   Pump Speed (RPM): 4800  Pump Flow (LPM): 3.4  MAP: 100 (R brachial doppler)  PI (Pulsitility Index): 6.3  Power: 3.2   Test: No  Back Up  at Bedside & Labeled: Yes  Power Module Test: No  Driveline Site Care: No  Driveline Dressing: Clean, Dry, and Intact  MAP in Therapeutic Range (Outpatient): Yes  Testing  Alarms Reviewed: Yes  Back up SC speed: 4800  Back up Low Speed Limit: 4400  Emergency Equipment with Patient?: Yes  Emergency procedures reviewed?: Yes  Drive line site inspected?: No  Drive line intergrity inspected?: No  Drive line dressing changed?: No      Total critical care time - 30 minutes (CPT 65533)      I personally spent the above critical care time. This is time spent at this critically ill patient's bedside / unit / floor actively involved in patient care as well as the coordination of care. This does not include any procedural time which has been billed separately. This does not include any NP/PA patient care time. I had a face to face encounter with the patient, reviewed and interpreted patient data including clinical events, labs, images, vital signs, I/O's, and examined patient.   I have discussed the case and the plan and management of the patient's care with the consulting services and bedside nurses. This patient has a high probability of imminent, clinically significant deterioration, which requires the highest level of preparedness to intervene urgently. I participated in the decision-making and personally managed or directed the management of life and organ supporting interventions to treat or prevent imminent deterioration. This patient has a critical illness or injury that is acutely impairing one or more vital organ systems such that there is a high probability of imminent or life threatening deterioration in the patient's condition. This patient requires high complexity medical decision making to assess, manipulate, and support vital organ system failure. After stabilization, this patient still requires management to prevent further life / organ threatening deterioration. \

## 2022-04-13 NOTE — PROGRESS NOTES
Occupational Therapy  04/13/22     Patient currently on OT caseload. OT tx attempted at 12:30 PM however patient with low flow alarms after ambulating in hallway with PT. Will continue to follow patient and attempt OT at a later time when medically appropriate.      Thank you,  Param Bean, OTR/L

## 2022-04-14 ENCOUNTER — APPOINTMENT (OUTPATIENT)
Dept: GENERAL RADIOLOGY | Age: 77
DRG: 001 | End: 2022-04-14
Attending: NURSE PRACTITIONER
Payer: MEDICARE

## 2022-04-14 LAB
ABO + RH BLD: NORMAL
ABO + RH BLD: NORMAL
ALBUMIN SERPL-MCNC: 2.8 G/DL (ref 3.5–5)
ALBUMIN/GLOB SERPL: 1.1 {RATIO} (ref 1.1–2.2)
ALP SERPL-CCNC: 93 U/L (ref 45–117)
ALT SERPL-CCNC: 40 U/L (ref 12–78)
ANION GAP SERPL CALC-SCNC: 6 MMOL/L (ref 5–15)
APTT PPP: 40.5 SEC (ref 22.1–31)
AST SERPL-CCNC: 83 U/L (ref 15–37)
ATRIAL RATE: 0 BPM
BILIRUB SERPL-MCNC: 1.2 MG/DL (ref 0.2–1)
BLOOD GROUP ANTIBODIES SERPL: NORMAL
BLOOD GROUP ANTIBODIES SERPL: NORMAL
BNP SERPL-MCNC: 4588 PG/ML
BUN SERPL-MCNC: 23 MG/DL (ref 6–20)
BUN/CREAT SERPL: 28 (ref 12–20)
CALCIUM SERPL-MCNC: 8.4 MG/DL (ref 8.5–10.1)
CALCULATED R AXIS, ECG10: -5 DEGREES
CALCULATED T AXIS, ECG11: -4 DEGREES
CHLORIDE SERPL-SCNC: 98 MMOL/L (ref 97–108)
CO2 SERPL-SCNC: 26 MMOL/L (ref 21–32)
CREAT SERPL-MCNC: 0.83 MG/DL (ref 0.7–1.3)
DIAGNOSIS, 93000: NORMAL
ERYTHROCYTE [DISTWIDTH] IN BLOOD BY AUTOMATED COUNT: 22.8 % (ref 11.5–14.5)
ERYTHROCYTE [SEDIMENTATION RATE] IN BLOOD: 27 MM/HR (ref 0–20)
GLOBULIN SER CALC-MCNC: 2.6 G/DL (ref 2–4)
GLUCOSE BLD STRIP.AUTO-MCNC: 103 MG/DL (ref 65–117)
GLUCOSE BLD STRIP.AUTO-MCNC: 154 MG/DL (ref 65–117)
GLUCOSE BLD STRIP.AUTO-MCNC: 159 MG/DL (ref 65–117)
GLUCOSE BLD STRIP.AUTO-MCNC: 218 MG/DL (ref 65–117)
GLUCOSE SERPL-MCNC: 115 MG/DL (ref 65–100)
HCT VFR BLD AUTO: 23.7 % (ref 36.6–50.3)
HCT VFR BLD AUTO: 25.5 % (ref 36.6–50.3)
HEMOCCULT STL QL: NEGATIVE
HGB BLD-MCNC: 7.7 G/DL (ref 12.1–17)
HGB BLD-MCNC: 8.2 G/DL (ref 12.1–17)
INR PPP: 2.2 (ref 0.9–1.1)
LACTATE SERPL-SCNC: 1 MMOL/L (ref 0.4–2)
LDH SERPL L TO P-CCNC: 185 U/L (ref 85–241)
MAGNESIUM SERPL-MCNC: 1.8 MG/DL (ref 1.6–2.4)
MCH RBC QN AUTO: 26.3 PG (ref 26–34)
MCHC RBC AUTO-ENTMCNC: 32.5 G/DL (ref 30–36.5)
MCV RBC AUTO: 80.9 FL (ref 80–99)
NRBC # BLD: 0 K/UL (ref 0–0.01)
NRBC BLD-RTO: 0 PER 100 WBC
PLATELET # BLD AUTO: 177 K/UL (ref 150–400)
PMV BLD AUTO: 10 FL (ref 8.9–12.9)
POTASSIUM SERPL-SCNC: 4 MMOL/L (ref 3.5–5.1)
PROCALCITONIN SERPL-MCNC: 0.88 NG/ML
PROT SERPL-MCNC: 5.4 G/DL (ref 6.4–8.2)
PROTHROMBIN TIME: 21.8 SEC (ref 9–11.1)
Q-T INTERVAL, ECG07: 608 MS
QRS DURATION, ECG06: 72 MS
QTC CALCULATION (BEZET), ECG08: 656 MS
RBC # BLD AUTO: 2.93 M/UL (ref 4.1–5.7)
SERVICE CMNT-IMP: ABNORMAL
SERVICE CMNT-IMP: NORMAL
SODIUM SERPL-SCNC: 130 MMOL/L (ref 136–145)
SPECIMEN EXP DATE BLD: NORMAL
SPECIMEN EXP DATE BLD: NORMAL
THERAPEUTIC RANGE,PTTT: ABNORMAL SECS (ref 58–77)
VENTRICULAR RATE, ECG03: 70 BPM
WBC # BLD AUTO: 7.7 K/UL (ref 4.1–11.1)

## 2022-04-14 PROCEDURE — 74011250637 HC RX REV CODE- 250/637: Performed by: NURSE PRACTITIONER

## 2022-04-14 PROCEDURE — 86900 BLOOD TYPING SEROLOGIC ABO: CPT

## 2022-04-14 PROCEDURE — 85730 THROMBOPLASTIN TIME PARTIAL: CPT

## 2022-04-14 PROCEDURE — 83735 ASSAY OF MAGNESIUM: CPT

## 2022-04-14 PROCEDURE — 71045 X-RAY EXAM CHEST 1 VIEW: CPT

## 2022-04-14 PROCEDURE — 93005 ELECTROCARDIOGRAM TRACING: CPT

## 2022-04-14 PROCEDURE — 93750 INTERROGATION VAD IN PERSON: CPT | Performed by: INTERNAL MEDICINE

## 2022-04-14 PROCEDURE — 83605 ASSAY OF LACTIC ACID: CPT

## 2022-04-14 PROCEDURE — 80053 COMPREHEN METABOLIC PANEL: CPT

## 2022-04-14 PROCEDURE — 74011636637 HC RX REV CODE- 636/637: Performed by: NURSE PRACTITIONER

## 2022-04-14 PROCEDURE — 74011250636 HC RX REV CODE- 250/636: Performed by: INTERNAL MEDICINE

## 2022-04-14 PROCEDURE — 83880 ASSAY OF NATRIURETIC PEPTIDE: CPT

## 2022-04-14 PROCEDURE — 74011000250 HC RX REV CODE- 250: Performed by: NURSE PRACTITIONER

## 2022-04-14 PROCEDURE — 83615 LACTATE (LD) (LDH) ENZYME: CPT

## 2022-04-14 PROCEDURE — P9047 ALBUMIN (HUMAN), 25%, 50ML: HCPCS | Performed by: NURSE PRACTITIONER

## 2022-04-14 PROCEDURE — 82272 OCCULT BLD FECES 1-3 TESTS: CPT

## 2022-04-14 PROCEDURE — 97116 GAIT TRAINING THERAPY: CPT

## 2022-04-14 PROCEDURE — 36415 COLL VENOUS BLD VENIPUNCTURE: CPT

## 2022-04-14 PROCEDURE — 97530 THERAPEUTIC ACTIVITIES: CPT

## 2022-04-14 PROCEDURE — 65610000003 HC RM ICU SURGICAL

## 2022-04-14 PROCEDURE — 74011250636 HC RX REV CODE- 250/636: Performed by: NURSE PRACTITIONER

## 2022-04-14 PROCEDURE — 82962 GLUCOSE BLOOD TEST: CPT

## 2022-04-14 PROCEDURE — 99233 SBSQ HOSP IP/OBS HIGH 50: CPT | Performed by: INTERNAL MEDICINE

## 2022-04-14 PROCEDURE — 97535 SELF CARE MNGMENT TRAINING: CPT

## 2022-04-14 PROCEDURE — P9045 ALBUMIN (HUMAN), 5%, 250 ML: HCPCS | Performed by: INTERNAL MEDICINE

## 2022-04-14 PROCEDURE — 84145 PROCALCITONIN (PCT): CPT

## 2022-04-14 PROCEDURE — 85652 RBC SED RATE AUTOMATED: CPT

## 2022-04-14 PROCEDURE — 99291 CRITICAL CARE FIRST HOUR: CPT | Performed by: THORACIC SURGERY (CARDIOTHORACIC VASCULAR SURGERY)

## 2022-04-14 PROCEDURE — 85027 COMPLETE CBC AUTOMATED: CPT

## 2022-04-14 PROCEDURE — P9045 ALBUMIN (HUMAN), 5%, 250 ML: HCPCS | Performed by: NURSE PRACTITIONER

## 2022-04-14 PROCEDURE — 93750 INTERROGATION VAD IN PERSON: CPT | Performed by: THORACIC SURGERY (CARDIOTHORACIC VASCULAR SURGERY)

## 2022-04-14 PROCEDURE — 74011250637 HC RX REV CODE- 250/637: Performed by: INTERNAL MEDICINE

## 2022-04-14 PROCEDURE — 85018 HEMOGLOBIN: CPT

## 2022-04-14 PROCEDURE — 85610 PROTHROMBIN TIME: CPT

## 2022-04-14 RX ORDER — ALBUMIN HUMAN 50 G/1000ML
25 SOLUTION INTRAVENOUS ONCE
Status: DISCONTINUED | OUTPATIENT
Start: 2022-04-14 | End: 2022-04-14

## 2022-04-14 RX ORDER — AMLODIPINE BESYLATE 5 MG/1
2.5 TABLET ORAL 2 TIMES DAILY
Status: DISCONTINUED | OUTPATIENT
Start: 2022-04-14 | End: 2022-04-15

## 2022-04-14 RX ORDER — ALBUMIN HUMAN 50 G/1000ML
SOLUTION INTRAVENOUS
Status: DISPENSED
Start: 2022-04-14 | End: 2022-04-14

## 2022-04-14 RX ORDER — MAGNESIUM SULFATE 1 G/100ML
1 INJECTION INTRAVENOUS ONCE
Status: COMPLETED | OUTPATIENT
Start: 2022-04-14 | End: 2022-04-14

## 2022-04-14 RX ORDER — ALBUMIN HUMAN 50 G/1000ML
12.5 SOLUTION INTRAVENOUS ONCE
Status: COMPLETED | OUTPATIENT
Start: 2022-04-14 | End: 2022-04-14

## 2022-04-14 RX ORDER — HYDRALAZINE HYDROCHLORIDE 50 MG/1
50 TABLET, FILM COATED ORAL EVERY 8 HOURS
Status: DISCONTINUED | OUTPATIENT
Start: 2022-04-14 | End: 2022-05-06 | Stop reason: HOSPADM

## 2022-04-14 RX ORDER — LOSARTAN POTASSIUM 25 MG/1
12.5 TABLET ORAL DAILY
Status: DISCONTINUED | OUTPATIENT
Start: 2022-04-15 | End: 2022-04-19

## 2022-04-14 RX ORDER — WARFARIN 4 MG/1
4 TABLET ORAL ONCE
Status: COMPLETED | OUTPATIENT
Start: 2022-04-14 | End: 2022-04-14

## 2022-04-14 RX ADMIN — ACETAMINOPHEN 650 MG: 325 TABLET ORAL at 06:18

## 2022-04-14 RX ADMIN — DOBUTAMINE HYDROCHLORIDE 6 MCG/KG/MIN: 400 INJECTION INTRAVENOUS at 07:26

## 2022-04-14 RX ADMIN — FENTANYL CITRATE 25 MCG: 0.05 INJECTION, SOLUTION INTRAMUSCULAR; INTRAVENOUS at 08:29

## 2022-04-14 RX ADMIN — ALBUMIN (HUMAN) 12.5 G: 0.25 INJECTION, SOLUTION INTRAVENOUS at 08:30

## 2022-04-14 RX ADMIN — GABAPENTIN 200 MG: 100 CAPSULE ORAL at 21:39

## 2022-04-14 RX ADMIN — SUCRALFATE 1 G: 1 TABLET ORAL at 16:22

## 2022-04-14 RX ADMIN — COLCHICINE 0.6 MG: 0.6 TABLET, FILM COATED ORAL at 08:30

## 2022-04-14 RX ADMIN — FAMOTIDINE 20 MG: 20 TABLET ORAL at 08:30

## 2022-04-14 RX ADMIN — SODIUM CHLORIDE 6 ML/HR: 9 INJECTION, SOLUTION INTRAVENOUS at 06:02

## 2022-04-14 RX ADMIN — HYDRALAZINE HYDROCHLORIDE 50 MG: 50 TABLET, FILM COATED ORAL at 21:42

## 2022-04-14 RX ADMIN — Medication 2 UNITS: at 16:22

## 2022-04-14 RX ADMIN — POTASSIUM CHLORIDE 20 MEQ: 750 TABLET, EXTENDED RELEASE ORAL at 17:14

## 2022-04-14 RX ADMIN — WARFARIN SODIUM 4 MG: 4 TABLET ORAL at 17:14

## 2022-04-14 RX ADMIN — SODIUM CHLORIDE, PRESERVATIVE FREE 10 ML: 5 INJECTION INTRAVENOUS at 23:05

## 2022-04-14 RX ADMIN — SUCRALFATE 1 G: 1 TABLET ORAL at 12:17

## 2022-04-14 RX ADMIN — ALBUMIN (HUMAN) 12.5 G: 12.5 INJECTION, SOLUTION INTRAVENOUS at 12:02

## 2022-04-14 RX ADMIN — POTASSIUM CHLORIDE 20 MEQ: 750 TABLET, EXTENDED RELEASE ORAL at 08:30

## 2022-04-14 RX ADMIN — KETOROLAC TROMETHAMINE 15 MG: 30 INJECTION, SOLUTION INTRAMUSCULAR; INTRAVENOUS at 03:22

## 2022-04-14 RX ADMIN — GABAPENTIN 200 MG: 100 CAPSULE ORAL at 08:31

## 2022-04-14 RX ADMIN — AMIODARONE HYDROCHLORIDE 200 MG: 200 TABLET ORAL at 08:30

## 2022-04-14 RX ADMIN — SODIUM CHLORIDE, PRESERVATIVE FREE 10 ML: 5 INJECTION INTRAVENOUS at 05:59

## 2022-04-14 RX ADMIN — AMIODARONE HYDROCHLORIDE 200 MG: 200 TABLET ORAL at 17:14

## 2022-04-14 RX ADMIN — SODIUM CHLORIDE 2 G: 9 INJECTION INTRAMUSCULAR; INTRAVENOUS; SUBCUTANEOUS at 14:16

## 2022-04-14 RX ADMIN — ASPIRIN 81 MG CHEWABLE TABLET 81 MG: 81 TABLET CHEWABLE at 08:30

## 2022-04-14 RX ADMIN — SUCRALFATE 1 G: 1 TABLET ORAL at 07:27

## 2022-04-14 RX ADMIN — ALBUMIN (HUMAN) 12.5 G: 12.5 INJECTION, SOLUTION INTRAVENOUS at 00:02

## 2022-04-14 RX ADMIN — ALBUMIN (HUMAN) 12.5 G: 0.25 INJECTION, SOLUTION INTRAVENOUS at 17:08

## 2022-04-14 RX ADMIN — KETOROLAC TROMETHAMINE 15 MG: 30 INJECTION, SOLUTION INTRAMUSCULAR; INTRAVENOUS at 16:23

## 2022-04-14 RX ADMIN — MAGNESIUM SULFATE 1 G: 1 INJECTION INTRAVENOUS at 07:26

## 2022-04-14 RX ADMIN — SUCRALFATE 1 G: 1 TABLET ORAL at 21:39

## 2022-04-14 RX ADMIN — GABAPENTIN 200 MG: 100 CAPSULE ORAL at 16:22

## 2022-04-14 RX ADMIN — MIRTAZAPINE 7.5 MG: 15 TABLET, FILM COATED ORAL at 21:39

## 2022-04-14 RX ADMIN — AMLODIPINE BESYLATE 2.5 MG: 5 TABLET ORAL at 17:14

## 2022-04-14 RX ADMIN — FAMOTIDINE 20 MG: 20 TABLET ORAL at 20:39

## 2022-04-14 RX ADMIN — SODIUM CHLORIDE, PRESERVATIVE FREE 10 ML: 5 INJECTION INTRAVENOUS at 14:16

## 2022-04-14 RX ADMIN — SODIUM CHLORIDE 2 G: 9 INJECTION INTRAMUSCULAR; INTRAVENOUS; SUBCUTANEOUS at 02:06

## 2022-04-14 NOTE — PROGRESS NOTES
Problem: Mobility Impaired (Adult and Pediatric)  Goal: *Acute Goals and Plan of Care (Insert Text)  Description: FUNCTIONAL STATUS PRIOR TO ADMISSION: Patient was independent and active without use of DME.    HOME SUPPORT PRIOR TO ADMISSION: The patient lived with his wife but did not require assist.    Physical Therapy Goals  Initiated 4/8/2022  1. Patient will move from supine to sit and sit to supine , scoot up and down, and roll side to side in bed with supervision/set-up within 7 days. 2.  Patient will perform sit to/from stand with supervision/set-up within 7 days. 3.  Patient will ambulate 50 feet with least restrictive assistive device and minimal assistance/contact guard assist within 7 days. 4.  Patient will ascend/descend 6 stairs with handrail(s) with minimal assistance/contact guard assist within 14 days. 5.  Patient will perform cardiac exercises per protocol with supervision/set-up within 7 days. 6.  Patient will verbally and functionally recall 3/3 sternal precautions within 7 days. 7.  Patient will perform power exchange for power module to/from battery with minimal assistance within 7 days. Outcome: Progressing Towards Goal    PHYSICAL THERAPY TREATMENT  Patient: Pritesh Jiménez (88 y.o. male)  Date: 4/14/2022  Diagnosis: HFrEF (heart failure with reduced ejection fraction) ([facility]) [I50.20] <principal problem not specified>  Procedure(s) (LRB):  REDO STERNOTOMY; RIGHT GROIN CUTDOWN FOR CANNULATION;  INSERTION OF LEFT VENTRICULAR ASSIST DEVICE (HMIII); AORTIC VALVE CLOSURE CHEL BY DR. Shanthi Obando. (N/A) 9 Days Post-Op  Precautions: Fall (move in the tube, LVAD)  Chart, physical therapy assessment, plan of care and goals were reviewed. ASSESSMENT  Patient continues with skilled PT services and is progressing towards goals. Pt changed over to batteries. Pt continues to want the  in NANDINI hose. Discussed better options but pt not open to suggestions.  Pt had BM with initial stand and transferred to [facility]. Pt was able to ambulate but noticeable unsteadiness that increase with fatigue. Pt reports shoes may help stability. Per pt he has shoes in the room    Current Level of Function Impacting Discharge (mobility/balance): min A     Other factors to consider for discharge: LVAD,         PLAN :  Patient continues to benefit from skilled intervention to address the above impairments. Continue treatment per established plan of care. to address goals. Recommendation for discharge: (in order for the patient to meet his/her long term goals)  Physical therapy at least 2 days/week in the home AND ensure assist and/or supervision for safety with mobility as needed    This discharge recommendation:  Has not yet been discussed the attending provider and/or case management    IF patient discharges home will need the following DME: none       SUBJECTIVE:   Patient stated I was just about to take a nap.     OBJECTIVE DATA SUMMARY:   Critical Behavior:  Neurologic State: Alert,Drowsy  Orientation Level: Oriented X4  Cognition: Appropriate decision making,Appropriate for age attention/concentration,Appropriate safety awareness,Follows commands     Functional Mobility Training:  Bed Mobility:     Supine to Sit: Stand-by assistance              Transfers:  Sit to Stand: Contact guard assistance (height decpendent )  Stand to Sit: Minimum assistance                             Balance:  Sitting: Intact  Standing: Impaired  Standing - Static: Good  Standing - Dynamic : Fair  Ambulation/Gait Training:  Distance (ft): 180 Feet (ft)  Assistive Device: Gait belt      Min A/CGA     Gait Abnormalities: Decreased step clearance; Path deviations        Base of Support: Center of gravity altered        Step Length: Left shortened;Right shortened                    Stairs:               Therapeutic Exercises:     Pain Rating:  Pain in the back improved since this AM    Activity Tolerance:   Limited After treatment patient left in no apparent distress:   Sitting in chair and Call bell within reach    COMMUNICATION/COLLABORATION:   The patients plan of care was discussed with: Occupational therapist and Registered nurse.      Hanh Kingston PTA   Time Calculation: 32 mins

## 2022-04-14 NOTE — PROGRESS NOTES
Problem: Self Care Deficits Care Plan (Adult)  Goal: *Acute Goals and Plan of Care (Insert Text)  Description: FUNCTIONAL STATUS PRIOR TO ADMISSION: pt lives with wife, independent prior    HOME SUPPORT: The patient lived with wife but did not require assist.    Occupational Therapy Goals  Initiated 4/8/2022  1. Patient will perform ADLs standing 5 mins without fatigue or LOB with supervision/set-up within 7 day(s). 2.  Patient will perform lower body ADLs with minimal assistance/contact guard assist within 7 day(s). 3.  Patient will perform upper body ADLs with minimal assistance/contact guard assist within 7 day(s). 4.  Patient will perform toilet transfers with minimal assistance/contact guard assist within 7 day(s). 5.  Patient will perform all aspects of toileting with minimal assistance/contact guard assist within 7 day(s). 6.  Patient will participate in cardiac/sternal upper extremity therapeutic exercise/activities to increase independence with ADLs with supervision/set-up for 5 minutes within 7 day(s). 7.  Patient will perform VAD switchover routine as part of ADL routine (including batteries<>batteries, battery clip<>battery clip, mock SC<>SC) with minimal assistance/contact guard assist within 7 days. Outcome: Progressing Towards Goal       OCCUPATIONAL THERAPY TREATMENT  Patient: Jarrod Tamez (55 y.o. male)  Date: 4/14/2022  Diagnosis: HFrEF (heart failure with reduced ejection fraction) ([facility]) [I50.20] <principal problem not specified>  Procedure(s) (LRB):  REDO STERNOTOMY; RIGHT GROIN CUTDOWN FOR CANNULATION;  INSERTION OF LEFT VENTRICULAR ASSIST DEVICE (HMIII); AORTIC VALVE CLOSURE CHEL BY DR. Gissell Yun. (N/A) 9 Days Post-Op  Precautions: Fall (move in the tube, LVAD)  Chart, occupational therapy assessment, plan of care, and goals were reviewed. ASSESSMENT  Patient continues with skilled OT services and is progressing towards goals.   Pt was SBA with additional time for supine to sit with min verbal cues for adherence to move in the tube while scooting to EOB. Pt required verbal cueing to assist with problem solving during PM to battery switchover (see below for details). Mod A to don holster vest due to lines and leads. Pt successfully placed batteries into clips and donned into holsters with additional time and supervision. Pt was CGA to transfer to [facility]. Noted pt slightly impulsive at times with lines with decreased safety awareness. He is adamant to place his  into the top of leigh hose liner. Pt reported he does not like the strap around his neck due to his R MAC line and does not like it around his waist due to chest tubes. Patient is verbalizing and is not demonstrating understanding of mindful-based movements (\"move in the tube\") principles of keeping UEs proximal to ribcage to prevent lateral pull on the sternum during load-bearing activities with verbal cues required for compliance. Current Level of Function Impacting Discharge (ADLs): CGA, cues for switchover, safety, line management    Other factors to consider for discharge: new LVAD         PLAN :  Patient continues to benefit from skilled intervention to address the above impairments. Continue treatment per established plan of care to address goals. Recommend with staff: OOB to chair and BSC with A for lines    Recommend next OT session: standing ADLs, LVAD terminology, switchovers    Recommendation for discharge: (in order for the patient to meet his/her long term goals)  No skilled occupational therapy/ follow up rehabilitation needs identified at this time. This discharge recommendation:  Has been made in collaboration with the attending provider and/or case management    IF patient discharges home will need the following DME: none       SUBJECTIVE:   Patient stated it's fine in there.     OBJECTIVE DATA SUMMARY:   Cognitive/Behavioral Status:  Neurologic State: Alert;Drowsy  Orientation Level: Oriented X4  Cognition: Appropriate decision making; Appropriate for age attention/concentration; Appropriate safety awareness; Follows commands             Functional Mobility and Transfers for ADLs:  Bed Mobility:  Supine to Sit: Stand-by assistance    Transfers:  Sit to Stand: Contact guard assistance (height decpendent )          Balance:  Sitting: Intact  Standing: Impaired  Standing - Static: Good  Standing - Dynamic : Fair    ADL Intervention:     Upper Body Dressing Assistance  Orthotics(Brace): Moderate assistance (LVAD holster vest)         Toileting  Bowel Hygiene: Set-up (performed seated on BSC)         Patient instructed and educated on mindful movement principles based on Move in The Tube concept to include maintaining bilateral elbows close to rib cage when performing any load-bearing activity such as getting in/out of bed, pushing up from a chair, opening a door, or lifting a box. Patient demonstrated switchover from power module to battery in prep for ADL routine. Demonstrated the following tasks:    Independent Verbal cues Physical assistance Comments   Check PM & SC    Pt stated he had already performed his  test this AM    Ensure  clipped onto stabilization belt    Pt likes to place  in top of [place] hose liner. Refuses to place around neck or waist.   Remove front (green lighted) batteries from , place back batteries into front slots x      Check batteries  x     Position batteries into battery clips x      Don holster vest   x    Disconnect power leads   x Pt attempted to plug white power lead from PM to battery, required cues to problem solve   Insert power lead into battery clip x      Severy batteries in holster x      Secure batteries with Velcro and clips x             Pain:  none    Activity Tolerance: WNL    After treatment patient left in no apparent distress:    Working with PT    COMMUNICATION/COLLABORATION:   The patients plan of care was discussed with: Physical therapy assistant and Occupational therapist.     Gina Casey OT  Time Calculation: 21 mins

## 2022-04-14 NOTE — PROGRESS NOTES
Bedside shift change report given to [address] [place] [address] (oncoming nurse) by Archana Almaraz (offgoing nurse). Report included the following information SBAR, Intake/Output, MAR and Recent Results.

## 2022-04-14 NOTE — PROGRESS NOTES
1930: Bedside and Verbal shift change report given to Pratik Valdez RN (oncoming nurse) by Priya Meade RN (offgoing nurse). Report included the following information SBAR, Intake/Output, MAR, Recent Results and Cardiac Rhythm Afib.    2000: Pt's MAP 65-68; Scheduled PO hydralazine held     2142: Pt's MAP 78-82; PO hydralazine given      0512: Pt's MAP 70-75; scheduled PO 50 mg hydralazine given    0600: Of note Pt's MAPs 52-58 after giving the scheduled PO 50 mg hydralazine; PI decreased down to 4.2-5.3; Pt not symptomatic     0645: Pt OOB to chair x1 assist     0730: Bedside and Verbal shift change report given to Andrew Galdamez RN (oncoming nurse) by Pratik Valdez RN (offgoing nurse). Report included the following information SBAR, Intake/Output, MAR, Recent Results and Cardiac Rhythm Afib .

## 2022-04-14 NOTE — PROGRESS NOTES
[facility] in [place], [place]  Inpatient Progress Note    Patient name: Antonio Bradshaw  Patient : 1945  Patient MRN: [id number]  Consulting MD: Yolanda Trammell MD  Date of service: 22    REASON FOR REFERRAL:  Management of LVAD     PLAN OF CARE:   · 70 y/o with severe ischemic cardiomyopathy, LVEF 15-20%; stage D, NYHA class IV symptoms; s/p LVAD implant with HM3 as DT due to age (22)  · Postoperative course:  ? Bleeding intra-op; resolved  ? RV dysfunction requiring prolonged inotropic support with dobutamine  ? Superficial soft tissue infection at sternotomy; antibiotics resumed   ?  Routine postoperative care      RECOMMENDATIONS:   POD#9 from LVAD implant  TTE shows severe TR but improved RV function; continue to optimize medically   Continue speed 4800rpms, low speed 4400  Continue dobutamine 6 mcg/kg/min, keep this dose and no plans to wean   Decrease hydralazine to 50 mg PO q8h   Continue decreased amiodarone to 200 mg PO twice daily due to nausea, vomiting   Decrease amlodipine to 2.5 mg PO BID   Reduce losartan to 12.5 mg PO daily   Hold diuretics today; CVP goal 12-14 mmHg   Continue Bumex 1mg IV as needed for CVP > 14   Continue cefepime q12h for soft tissue infection at sternotomy site; examined by Dr. Dillon godoy    Continue warfarin, goal INR 2-3   ASA 81mg daily  FOB negative   Continue colchicine for pericarditis   Continue gabapentin to 200 mg PO TID   Toradol q6h PRN and before PT/OT; OK with Dr. Dillon Fountain, renal function stable  Continue fentanyl and Lidocaine patch  D/w pharmacy alternative pain management options; consider tramadol trial if no relief from gabapentin   ESR, PCT today   Driveline dressing changes daily for now until 5/3/22  Continue bowel regimen  Low dose Remeron  Pulmonary toilet, monitor CT output   SSI   CPAP QHS  Advanced care plan forms on file   Strict I/O, daily weights, Na+ restricted diet   Continue VAD education      IMPRESSION:  S/p LVAD implant as DT on 4/5/22 with Dr. Andrea Mcdonnell   chronic systolic heart failure  · Stage D, NYHA class IV symptoms improved to class II on inotropes  · Non-ischemic cardiomyopathy, LVEF 20% with LVEDD 6.2  · RV dysfunction, TAPSE 1.22 (prelimnary read)  · TBili 1.5 likely from cardiac congestion  At risk of sudden cardiac death  · Recent cardiac arrest   · S/p ICD (1/2013, [employer] followed by [facility]); New implant on 10/21/2021 [employer]  Coronary artery disease  · S/p multiple interventions  · S/p 4V CABG (8/2012)  · LHC (7/2019) severe stenosis of LIMA to LAD anastomosis site, SVG graft to OM is occluded, SVG graft to RCA 40-50%, LAD occluded proximally, severe proximal LCx, RCA is the long . · PET (6/2019) EF 24% with anterior lateral and inferior reversible defect  Cardiac risk factors  · HTN  · HL  · DM2  · SRUTHI on CPAP  · MIld carotid stenosis  Anemia, microcytic  · Iron deficiency  DVT with filter  Pulmonary nodules   Dysphagia      Interval Events:  Hypotensive overnight, requiring albumin  No low flows   Sternotomy continues to drain   Giles removed      CARDIAC IMAGING:  Echo (4/8/22)    Left Ventricle: Left ventricle size is normal. Moderately increased wall thickness. Findings consistent with concentric remodeling. Severe global hypokinesis present. Severely reduced left ventricular systolic function with a visually estimated EF of 10 -15%. 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   Aortic Valve: Moderate sclerosis of the aortic valve cusp.   Mitral Valve: Moderately thickened leaflet. Mild transvalvular regurgitation.   Tricuspid Valve: Severe transvalvular regurgitation.   Right Atrium: Right atrium is severely dilated.     Echo (3/2/22)   · LV 10-15%  · Mod-severe MR      Echo (11/23/21):  · LV 15-20%. · Mod-severe, MR, mod-TR     Echo (5/20/21)  · LV: Estimated LVEF is 20 - 25%.  Normal wall thickness. Mildly dilated left ventricle. Severely and globally reduced systolic function. Severe (grade 4) left ventricular diastolic dysfunction. · LA: Severely dilated left atrium. · RA: Severely dilated right atrium. · MV: Moderate mitral valve regurgitation is present. · TV: Moderate tricuspid valve regurgitation is present. · AO: Mild aortic root dilatation. · RV: Pacer/ICD present. · LVED 6.2cm     EKG (5/20/21) SB with 1degree AV block, QRS 116ms     LHC (5/24/21) Native Coronaries: LM - moderate to severe distal disease 50%. % prox occluded (), heavily calcified, LCx: 80% proximal stenosis. OM1 is  (seen filling faintly via collaterals). OM2 99% stenosis. RCA: 100% proximal occluded.  LIMA to LAD: patent, supplies only a diagonal branch (probably D2). The LAD distal to the bifurcation is 100% occluded () and fills via right to left collaterals off the SVG to RCA. SVG to R-PDA: patent with moderate diffuse irregularities but no obstructive disease in the graft.    No appealing interventional targets.      NST as above     ICD Interrogation (11/12/2021) [employer] VVI, thoracic impedence trending up, no events, normal device function and good battery  ICD interrogation (5/12/21) [facility] [employer] [facility] single lead, no events, normal device function and good battery     HEMODYNAMICS:  RHC 5/24/21 CI 1.97, PA 33/9/17, RA 1, PCWP 6- off milrinone   CPEST not done     Patient underwent a 6 minute walk test 11/12/2021     6 Min Walk Report 11/12/2021 7/6/2021 5/20/2021   (PRE) HR 88 98 74   (PRE) O2 Sat 100 - 99   (POST)  - 81   (POST) O2 Sat 99 98% on Ra 99   Distance in Meters 386.58 - 1158. 24       OTHER IMAGING:  CXR (6/17/21) clear  CT 5/21/21 1. Irregular pulmonary nodule in the left upper lobe measuring 2 cm, suspicious for primary pulmonary malignancy. 2. Additional 6 mm left upper lobe pulmonary nodule.  3. Severe calcific atherosclerosis of the coronary arteries and abdominal aorta. No aneurysm. 4. Cardiomegaly. 5. No acute abnormality within the chest, abdomen, or pelvis.      HISTORY OF PRESENT ILLNESS:  Briefly, Marcela Willard is a 69 y.o. male with h/o HTN, HL, DM2, SRUTHI on CPAP, chronic systolic heart failure, stage D, NYHA class IV symptoms, non-ischemic cardiomyopathy, LVEF 20% with LVEDD 6.2, RV dysfunciton, TAPSE 1.22, TBili 1.5 likely from cardiac congestion, recent cardiac arrest s/p ICD (1/2013, [employer] [facility] followed by [facility]), coronary artery disease s/p multiple interventions s/p 4V CABG (8/2012), C (7/2019) severe stenosis of LIMA to LAD anastomosis site, SVG graft to OM is occluded, SVG graft to RCA 40-50%, LAD occluded proximally, severe proximal LCx, RCA is the long . PET (6/2019) EF 24% with anterior lateral and inferior reversible defect. Anemia, microcytic with iron deficiency, DVT with filter.     Patient was referred to [facility] by Dr. Betty Kim for evaluation of his candidacy for advanced therapies.     Patient agreed to inpatient initiation of palliative infusion of chronic inotropes and evaluation for LVAD-DT. Patient was admitted 5/2-5/25/21. Patient was started on milrinone infusion and had first part of LVAD evaluation completed. Right and left heart cath on 5/24/21 that showed severe diffuse native vessel CAD, with patent grafts (LIMA to D2, SVG to RCA).  Antiplatelet therapy was held during hospitalization and after discharge due to anticipated pulmonary nodule biopsy, bone marrow biopsy and EGD/C-Scope as part of LVAD workup.     Patient was readmitted 5/26-5/28/21 with hypertension, headache and chest pain, his troponin peaked at 10; he was shortly bridged with heparin, otherwise had normal EKG and chest CT negative for PE. Cardiology and AHF service was consulted and patient was discharged home after troponin came down.      Patient has now completed radiation treatment for adenocarcinoma of the lung.  Hem-onc has cleared patient for VAD implant with 90% 2 year prognosis.      He was most recently admitted for elective pre-op EGD and colonoscopy which he tolerated well; path negative for malignancy.      He presented to [facility] on 4/3 for LVAD implant. LVAD INTERROGATION:  Device interrogated in person  Device function normal, normal flow, no events  LVAD   Pump Speed (RPM): 4800  Pump Flow (LPM): 3  MAP: 72 (L radial doppler)  PI (Pulsitility Index): 8.9  Power: 3.1   Test: No  Back Up  at Bedside & Labeled: Yes  Power Module Test: No  Driveline Site Care: No  Driveline Dressing: Clean, Dry, and Intact  MAP in Therapeutic Range (Outpatient): Yes  Testing  Alarms Reviewed: Yes  Back up SC speed: 4800  Back up Low Speed Limit: 4400  Emergency Equipment with Patient?: Yes  Emergency procedures reviewed?: Yes  Drive line site inspected?: No  Drive line intergrity inspected?: Yes  Drive line dressing changed?: No    PHYSICAL EXAM:  Visit Vitals  BP (!) 73/60 (BP 1 Location: Left lower arm, BP Patient Position: At rest)   Pulse 80   Temp 98.2 °F (36.8 °C)   Resp 17   Ht 6' 1\" (1.854 m)   Wt 191 lb 9.3 oz (86.9 kg)   SpO2 100%   BMI 25.28 kg/m²     General: Patient is well developed, well-nourished in no acute distress  HEENT: Normocephalic and atraumatic. No scleral icterus. Pupils are equal, round and reactive to light and accomodation. No conjunctival injection. Oropharynx is clear. Neck: Supple. No evidence of thyroid enlargements or lymphadenopathy. JVD: Cannot be appreciated   Lungs: Breath sounds are equal and clear bilaterally. No wheezes, rhonchi, or rales. Heart: VAD hum  Abdomen: Soft, no mass or tenderness. No organomegaly or hernia. Bowel sounds present. Genitourinary and rectal: deferred  Extremities: No cyanosis, clubbing. 1+ pitting LE edema. Neurologic: No focal sensory or motor deficits are noted. Grossly intact. Psychiatric: Awake, alert an doriented x 3. Appropriate mood and affect.   Skin: Warm, dry and well perfused. No lesions, nodules or rashes are noted. REVIEW OF SYSTEMS:  General: Denies fever, night sweats. Ear, nose and throat: Denies difficulty hearing, sinus problems, runny nose, post-nasal drip, ringing in ears, mouth sores, loose teeth, ear pain, nosebleeds, sore throate, facial pain or numbess  Cardiovascular: see above in the interval history  Respiratory: Denies cough, wheezing, sputum production, hemoptysis. Gastrointestinal: + nausea, vomiting this AM.   Kidney and bladder: Denies painful urination, frequent urination, urgency, prostate problems and impotence  Musculoskeletal: Denies joint pain, muscle weakness  Skin and hair: Denies change in existing skin lesions, hair loss or increase    PAST MEDICAL HISTORY:  Past Medical History:   Diagnosis Date    CAD (coronary artery disease) '90 '97, '13 x 2    [place] [facility] [place] Calculus of kidney     Colonic polyps     Congestive heart failure, unspecified     Diabetes ([facility] [address]     Gastritis     Hypercholesterolemia     Sleep apnea        PAST SURGICAL HISTORY:  Past Surgical History:   Procedure Laterality Date    COLONOSCOPY  04/06/2011    16, due 21    COLONOSCOPY N/A 3/2/2022    COLONOSCOPY    :- performed by [place] Bra[place] MD at [facility] ENDOSCOPY    ENDOSCOPY, COLON, DIAGNOSTIC      11, due 16    HX CORONARY ARTERY BYPASS GRAFT  8/22/12    x 4 vessel by S.  James    HX HEENT      LASIK    HX PACEMAKER PLACEMENT  1/30/13    UT CARDIAC SURG PROCEDURE UNLIST  2012    x 4 vessels       FAMILY HISTORY:  Family History   Problem Relation Age of Onset    Heart Disease Mother         MI    Heart Disease Father         CAD & PVD    Lung Disease Father     Cancer Father         lung    Diabetes Maternal Grandmother     Heart Disease Other         CAD    Stroke Sister        SOCIAL HISTORY:  Social History     Socioeconomic History    Marital status:    Tobacco Use    Smoking status: Never Smoker    Smokeless tobacco: Never Used   Vaping Use    Vaping Use: Never used   Substance and Sexual Activity    Alcohol use: Yes     Alcohol/week: 0.0 standard drinks     Comment:  VERY RARE    Drug use: No       LABORATORY RESULTS:     Labs Latest Ref Rng & Units 4/14/2022 4/14/2022 4/13/2022 4/12/2022 4/11/2022 4/10/2022 4/9/2022   WBC 4.1 - 11.1 K/uL - 7.7 8.4 9.1 8.2 7.9 7.7   RBC 4.10 - 5.70 M/uL - 2.93(L) 3.33(L) 3.23(L) 3.39(L) 3.31(L) 3.16(L)   Hemoglobin 12.1 - 17.0 g/dL 8. 2(L) 7. 7(L) 8.7(L) 8.5(L) 8.8(L) 8.6(L) 8. 3(L)   Hematocrit 36.6 - 50.3 % 25. 5(L) 23. 7(L) 26. 9(L) 26. 1(L) 27. 2(L) 26. 1(L) 24. 9(L)   MCV 80.0 - 99.0 FL - 80.9 80.8 80.8 80.2 78. 9(L) 78. 8(L)   Platelets 561 - 793 K/uL - 177 178 161 144(L) 125(L) 104(L)   Lymphocytes 12 - 49 % - - - - - - -   Monocytes 5 - 13 % - - - - - - -   Eosinophils 0 - 7 % - - - - - - -   Basophils 0 - 1 % - - - - - - -   Bands 0 - 6 % - - - - - - -   Albumin 3.5 - 5.0 g/dL - 2. 8(L) 2. 9(L) 2. 8(L) 2. 6(L) 3. 1(L) 3. 1(L)   Calcium 8.5 - 10.1 MG/DL - 8.4(L) 8.7 8.5 8.2(L) 8. 1(L) 8. 3(L)   Glucose 65 - 100 mg/dL - 115(H) 135(H) 157(H) 136(H) 135(H) 107(H)   BUN 6 - 20 MG/DL - 23(H) 22(H) 18 22(H) 29(H) 30(H)   Creatinine 0.70 - 1.30 MG/DL - 0.83 0.96 0.82 0.79 0.92 0.87   Sodium 136 - 145 mmol/L - 130(L) 131(L) 132(L) 133(L) 132(L) 132(L)   Potassium 3.5 - 5.1 mmol/L - 4.0 4.4 4.3 4.1 3.8 3.8   TSH 0.36 - 3.74 uIU/mL - - - - - 2.26 -   LDH 85 - 241 U/L - 185 248(H) - 276(H) 319(H) 289(H)   Some recent data might be hidden     Lab Results   Component Value Date/Time    TSH 2.26 04/10/2022 03:00 AM    TSH 1.68 03/01/2022 11:50 AM    TSH 1.47 05/26/2021 10:44 PM    TSH 1.97 05/20/2021 04:28 PM    TSH 1.41 02/09/2021 03:05 PM    TSH 1.740 08/14/2018 09:58 AM    TSH 1.710 10/18/2017 12:00 AM       ALLERGY:  Allergies   Allergen Reactions    Oxycodone Anaphylaxis    Pcn [Penicillins] Hives        CURRENT MEDICATIONS:    Current Facility-Administered Medications:     warfarin (COUMADIN) tablet 4 mg, 4 mg, Oral, ONCE, Polljuan migueld, Venecia Kathryn T, NP    cefepime (MAXIPIME) 2 g in 0.9% sodium chloride 10 mL IV syringe, 2 g, IntraVENous, Q12H, Polljuan migueld, Venecia Kathryn T, NP, 2 g at 04/14/22 1416    losartan (COZAAR) tablet 25 mg, 25 mg, Oral, DAILY, Polliard, Venecia Kathryn T, NP, 25 mg at 04/13/22 1422    amiodarone (CORDARONE) tablet 200 mg, 200 mg, Oral, BID, Polliard, Venecia Kathryn T, NP, 200 mg at 04/14/22 0830    colchicine tablet 0.6 mg, 0.6 mg, Oral, DAILY, Polliard, Venecia Kathryn T, NP, 0.6 mg at 04/14/22 0830    alteplase (CATHFLO) 1 mg in sterile water (preservative free) 1 mL injection, 1 mg, InterCATHeter, PRN, Juancho Ortez MD    bisacodyL (DULCOLAX) suppository 10 mg, 10 mg, Rectal, DAILY PRN, Darryl Tan MD, 10 mg at 04/12/22 1533    [Held by provider] bumetanide (BUMEX) injection 1 mg, 1 mg, IntraVENous, TID, Mirian Wong, NP, 1 mg at 04/13/22 2105    gabapentin (NEURONTIN) capsule 200 mg, 200 mg, Oral, TID, Polliard, Venecia Kathryn T, NP, 200 mg at 04/14/22 0831    ketorolac (TORADOL) injection 15 mg, 15 mg, IntraVENous, Q6H PRN, Marvin Millie T, NP, 15 mg at 04/14/22 0322    albumin human 25% (BUMINATE) solution 12.5 g, 12.5 g, IntraVENous, BID, Polliard, Venecia Kathryn T, NP, 12.5 g at 04/14/22 0830    amLODIPine (NORVASC) tablet 5 mg, 5 mg, Oral, BID, Polliard, Venecia Kathryn T, NP, 5 mg at 04/13/22 1718    polyethylene glycol (MIRALAX) packet 17 g, 17 g, Oral, BID, Darryl Tan MD, 17 g at 04/13/22 1718    senna-docusate (PERICOLACE) 8.6-50 mg per tablet 1 Tablet, 1 Tablet, Oral, BID, Darryl Tan MD, 1 Tablet at 04/13/22 1718    potassium chloride SR (KLOR-CON 10) tablet 20 mEq, 20 mEq, Oral, BID, Darryl Tan MD, 20 mEq at 04/14/22 0830    hydrALAZINE (APRESOLINE) tablet 75 mg, 75 mg, Oral, QID, Darryl Tan MD, 75 mg at 04/13/22 2306    famotidine (PEPCID) tablet 20 mg, 20 mg, Oral, Q12H, Darryl Tan MD, 20 mg at 04/14/22 0830    bumetanide (BUMEX) injection 1 mg, 1 mg, IntraVENous, Q6H PRN, Jayshree Corbett MD, 1 mg at 04/10/22 1234    mirtazapine (REMERON) tablet 7.5 mg, 7.5 mg, Oral, QHS, Terry, Danica B, NP, 7.5 mg at 04/13/22 2105    lidocaine 4 % patch 1 Patch, 1 Patch, TransDERmal, Q24H, Terry, Danica B, NP, 1 Patch at 04/14/22 1217    Warfarin NP/MD dosing, , Other, PRN, Terry, Danica B, NP    melatonin tablet 3 mg, 3 mg, Oral, QHS PRN, Christy Delaney MD, 3 mg at 04/10/22 0251    hydrALAZINE (APRESOLINE) 20 mg/mL injection 10 mg, 10 mg, IntraVENous, Q6H PRN, Melvenia Jackson B, NP, 10 mg at 04/10/22 0245    aspirin chewable tablet 81 mg, 81 mg, Oral, DAILY, Melvenia Jackson B, NP, 81 mg at 04/14/22 0830    hydrALAZINE (APRESOLINE) 20 mg/mL injection 5 mg, 5 mg, IntraVENous, Q6H PRN, Melvenia Jackson B, NP, 5 mg at 04/13/22 0639    fentaNYL (DURAGESIC) 12 mcg/hr patch 1 Patch, 1 Patch, TransDERmal, Q72H, Melvenia Jackson B, NP, 1 Patch at 04/13/22 2245    sodium chloride (NS) flush 5-40 mL, 5-40 mL, IntraVENous, Q8H, Millie Wong NP, 10 mL at 04/14/22 1416    sodium chloride (NS) flush 5-40 mL, 5-40 mL, IntraVENous, PRN, Raza Wong NP    0.9% sodium chloride infusion, 9 mL/hr, IntraVENous, CONTINUOUS, Cee Wong NP, Last Rate: 6 mL/hr at 04/14/22 0745, 6 mL/hr at 04/14/22 0745    acetaminophen (TYLENOL) tablet 650 mg, 650 mg, Oral, Q6H PRN, Millie Wong NP, 650 mg at 04/14/22 0618    naloxone (NARCAN) injection 0.4 mg, 0.4 mg, IntraVENous, PRN, Raza Wong, NP    [facility]etron [facility]) injection 4 mg, 4 mg, IntraVENous, Q4H PRN, Cee Wong NP, 4 mg at 04/11/22 2139    albuterol-ipratropium (DUO-NEB) 2.5 MG-0.5 MG/3 ML, 3 mL, Nebulization, Q6H PRN, Raza Wong, NP    midazolam (VERSED) injection 1 mg, 1 mg, IntraVENous, Q1H PRN, Raza Wong, NP    ELECTROLYTE REPLACEMENT NOTE: Nurse to review Serum Potassium and Magnesuim levels and Initiate Electrolyte Replacement Protocol as needed, 1 Each, Other, PRN, Madina Wong NP    insulin regular (NOVOLIN R, HUMULIN R) 100 Units in 0.9% sodium chloride 100 mL infusion, 0-50 Units/hr, IntraVENous, TITRATE, Madina Wong NP, Stopped at 04/09/22 1619    glucose chewable tablet 16 g, 4 Tablet, Oral, PRN, Madina Wong NP    glucagon (GLUCAGEN) injection 1 mg, 1 mg, IntraMUSCular, PRN, Madina Wong NP    insulin lispro (HUMALOG) injection, , SubCUTAneous, AC&HS, Marvin, Renetta Prom T, NP, 3 Units at 04/13/22 1130    dextrose 10 % infusion 0-250 mL, 0-250 mL, IntraVENous, PRN, Madina Wong NP, Last Rate: 999 mL/hr at 04/07/22 2145, 45 mL at 04/07/22 2145    sucralfate (CARAFATE) tablet 1 g, 1 g, Oral, AC&HS, Madina Wong NP, 1 g at 04/14/22 1217    0.45% sodium chloride infusion, 10 mL/hr, IntraVENous, CONTINUOUS, Daniela Najera MD, Stopped at 04/10/22 0730    fentaNYL citrate (PF) injection 25 mcg, 25 mcg, IntraVENous, Q2H PRN, Madina Wong NP, 25 mcg at 04/14/22 0829    DOBUTamine (DOBUTREX) 1,000 mg/250 mL (4,000 mcg/mL) infusion, 0-10 mcg/kg/min, IntraVENous, TITRATE, Madina Wong NP, Last Rate: 6.8 mL/hr at 04/14/22 0745, 6 mcg/kg/min at 04/14/22 0745    PATIENT CARE TEAM:  Patient Care Team:  Kiki Russell MD as PCP - General (Internal Medicine)  Kiki Russell MD as PCP - REHABILITATION [facility] Provider  Ryan Palmer MD as Physician (Cardiology)  Connie Dubin., MD as Physician (Gastroenterology)  Latricia Coleman MD as Physician (Orthopedic Surgery)  Luisa Barrera MD as Physician (Ophthalmology)  Felipa Onofre MD (Cardiology)  Ben Velazquez MD (Cardiology)      Thank you for allowing me to participate in this patient's care. Rashard Stoddard NP   [address] [place] [address], [address]  Phone: [phone]    [facility] ATTENDING ADDENDUM    Patient was seen and examined in person. Data and notes were reviewed.  I have discussed and agree with the plan as noted in the NP note above without further additions.     Adri Sr MD PhD  Omar Lindsay

## 2022-04-14 NOTE — PROGRESS NOTES
0800 Bedside shift change report given to Gracie RN (oncoming nurse) by Emile James RN (offgoing nurse). Report included the following information SBAR, Kardex, OR Summary, Procedure Summary, Intake/Output, MAR, Recent Results and Cardiac Rhythm A fib. Patient complaining of severe pain at left abdomen-IV fentanyl given. 0900 BP medications held due to low MAPs in 60s. HF informed. Will wait for Heart Failure team to round on patient for further instructions. 36 HF team at bedside. CVP goal between 12-14. EKG completed and labs sent. CT output this hour was 100cc. YOLI Calloway informed. 1200 Giles removed per HF team. DTV at 1800. Dr Little Gilman at bedside to assess wound. Add Ensure to increase protein intake. 1400 PT working with patient- able to walk around the unit with minimal assist. Fecal occult obtained. 1600 Wife able to do her first LVAD dressing change. 1645 Bedside shift change report given to Veena RN (oncoming nurse) by Jeannette Wolfe RN (offgoing nurse). Report included the following information SBAR, Kardex, ED Summary, Procedure Summary, Intake/Output, MAR, Accordion, Recent Results and Cardiac Rhythm A fib.

## 2022-04-15 ENCOUNTER — APPOINTMENT (OUTPATIENT)
Dept: GENERAL RADIOLOGY | Age: 77
DRG: 001 | End: 2022-04-15
Attending: NURSE PRACTITIONER
Payer: MEDICARE

## 2022-04-15 LAB
ALBUMIN SERPL-MCNC: 2.9 G/DL (ref 3.5–5)
ALBUMIN/GLOB SERPL: 1 {RATIO} (ref 1.1–2.2)
ALP SERPL-CCNC: 112 U/L (ref 45–117)
ALT SERPL-CCNC: 34 U/L (ref 12–78)
ANION GAP SERPL CALC-SCNC: 7 MMOL/L (ref 5–15)
APTT PPP: 39.1 SEC (ref 22.1–31)
AST SERPL-CCNC: 60 U/L (ref 15–37)
BILIRUB SERPL-MCNC: 1 MG/DL (ref 0.2–1)
BNP SERPL-MCNC: 4160 PG/ML
BUN SERPL-MCNC: 26 MG/DL (ref 6–20)
BUN/CREAT SERPL: 30 (ref 12–20)
CALCIUM SERPL-MCNC: 8.3 MG/DL (ref 8.5–10.1)
CHLORIDE SERPL-SCNC: 98 MMOL/L (ref 97–108)
CO2 SERPL-SCNC: 24 MMOL/L (ref 21–32)
CREAT SERPL-MCNC: 0.87 MG/DL (ref 0.7–1.3)
ERYTHROCYTE [DISTWIDTH] IN BLOOD BY AUTOMATED COUNT: 22.6 % (ref 11.5–14.5)
GLOBULIN SER CALC-MCNC: 2.9 G/DL (ref 2–4)
GLUCOSE BLD STRIP.AUTO-MCNC: 129 MG/DL (ref 65–117)
GLUCOSE BLD STRIP.AUTO-MCNC: 162 MG/DL (ref 65–117)
GLUCOSE BLD STRIP.AUTO-MCNC: 164 MG/DL (ref 65–117)
GLUCOSE SERPL-MCNC: 141 MG/DL (ref 65–100)
HCT VFR BLD AUTO: 25.5 % (ref 36.6–50.3)
HGB BLD-MCNC: 8.2 G/DL (ref 12.1–17)
INR PPP: 1.9 (ref 0.9–1.1)
INR PPP: 1.9 (ref 0.9–1.1)
LACTATE SERPL-SCNC: 1.1 MMOL/L (ref 0.4–2)
LDH SERPL L TO P-CCNC: 192 U/L (ref 85–241)
MAGNESIUM SERPL-MCNC: 2.1 MG/DL (ref 1.6–2.4)
MCH RBC QN AUTO: 25.9 PG (ref 26–34)
MCHC RBC AUTO-ENTMCNC: 32.2 G/DL (ref 30–36.5)
MCV RBC AUTO: 80.4 FL (ref 80–99)
NRBC # BLD: 0 K/UL (ref 0–0.01)
NRBC BLD-RTO: 0 PER 100 WBC
PLATELET # BLD AUTO: 208 K/UL (ref 150–400)
PMV BLD AUTO: 10.1 FL (ref 8.9–12.9)
POTASSIUM SERPL-SCNC: 4.6 MMOL/L (ref 3.5–5.1)
PROT SERPL-MCNC: 5.8 G/DL (ref 6.4–8.2)
PROTHROMBIN TIME: 19.4 SEC (ref 9–11.1)
PROTHROMBIN TIME: 19.5 SEC (ref 9–11.1)
RBC # BLD AUTO: 3.17 M/UL (ref 4.1–5.7)
SERVICE CMNT-IMP: ABNORMAL
SODIUM SERPL-SCNC: 129 MMOL/L (ref 136–145)
THERAPEUTIC RANGE,PTTT: ABNORMAL SECS (ref 58–77)
WBC # BLD AUTO: 9.2 K/UL (ref 4.1–11.1)

## 2022-04-15 PROCEDURE — 74011250637 HC RX REV CODE- 250/637: Performed by: NURSE PRACTITIONER

## 2022-04-15 PROCEDURE — 71045 X-RAY EXAM CHEST 1 VIEW: CPT

## 2022-04-15 PROCEDURE — 85610 PROTHROMBIN TIME: CPT

## 2022-04-15 PROCEDURE — 83615 LACTATE (LD) (LDH) ENZYME: CPT

## 2022-04-15 PROCEDURE — C1751 CATH, INF, PER/CENT/MIDLINE: HCPCS

## 2022-04-15 PROCEDURE — 85027 COMPLETE CBC AUTOMATED: CPT

## 2022-04-15 PROCEDURE — 80053 COMPREHEN METABOLIC PANEL: CPT

## 2022-04-15 PROCEDURE — 99233 SBSQ HOSP IP/OBS HIGH 50: CPT | Performed by: INTERNAL MEDICINE

## 2022-04-15 PROCEDURE — 83880 ASSAY OF NATRIURETIC PEPTIDE: CPT

## 2022-04-15 PROCEDURE — 74011000250 HC RX REV CODE- 250: Performed by: NURSE PRACTITIONER

## 2022-04-15 PROCEDURE — 36415 COLL VENOUS BLD VENIPUNCTURE: CPT

## 2022-04-15 PROCEDURE — 74011250636 HC RX REV CODE- 250/636: Performed by: NURSE PRACTITIONER

## 2022-04-15 PROCEDURE — 65610000003 HC RM ICU SURGICAL

## 2022-04-15 PROCEDURE — 02HV33Z INSERTION OF INFUSION DEVICE INTO SUPERIOR VENA CAVA, PERCUTANEOUS APPROACH: ICD-10-PCS | Performed by: NURSE PRACTITIONER

## 2022-04-15 PROCEDURE — P9047 ALBUMIN (HUMAN), 25%, 50ML: HCPCS | Performed by: NURSE PRACTITIONER

## 2022-04-15 PROCEDURE — 76937 US GUIDE VASCULAR ACCESS: CPT

## 2022-04-15 PROCEDURE — 85730 THROMBOPLASTIN TIME PARTIAL: CPT

## 2022-04-15 PROCEDURE — 83605 ASSAY OF LACTIC ACID: CPT

## 2022-04-15 PROCEDURE — 03HY32Z INSERTION OF MONITORING DEVICE INTO UPPER ARTERY, PERCUTANEOUS APPROACH: ICD-10-PCS | Performed by: STUDENT IN AN ORGANIZED HEALTH CARE EDUCATION/TRAINING PROGRAM

## 2022-04-15 PROCEDURE — 77030020365 HC SOL INJ SOD CL 0.9% 50ML

## 2022-04-15 PROCEDURE — 97530 THERAPEUTIC ACTIVITIES: CPT

## 2022-04-15 PROCEDURE — 97116 GAIT TRAINING THERAPY: CPT

## 2022-04-15 PROCEDURE — 83735 ASSAY OF MAGNESIUM: CPT

## 2022-04-15 PROCEDURE — B24BZZ4 ULTRASONOGRAPHY OF HEART WITH AORTA, TRANSESOPHAGEAL: ICD-10-PCS | Performed by: STUDENT IN AN ORGANIZED HEALTH CARE EDUCATION/TRAINING PROGRAM

## 2022-04-15 PROCEDURE — 93750 INTERROGATION VAD IN PERSON: CPT | Performed by: INTERNAL MEDICINE

## 2022-04-15 PROCEDURE — 36573 INSJ PICC RS&I 5 YR+: CPT | Performed by: NURSE PRACTITIONER

## 2022-04-15 PROCEDURE — 82962 GLUCOSE BLOOD TEST: CPT

## 2022-04-15 PROCEDURE — 74011250637 HC RX REV CODE- 250/637: Performed by: INTERNAL MEDICINE

## 2022-04-15 RX ORDER — POTASSIUM CHLORIDE 750 MG/1
20 TABLET, FILM COATED, EXTENDED RELEASE ORAL DAILY
Status: DISCONTINUED | OUTPATIENT
Start: 2022-04-15 | End: 2022-04-16

## 2022-04-15 RX ORDER — WARFARIN SODIUM 5 MG/1
5 TABLET ORAL ONCE
Status: COMPLETED | OUTPATIENT
Start: 2022-04-15 | End: 2022-04-15

## 2022-04-15 RX ADMIN — SODIUM CHLORIDE 2 G: 9 INJECTION INTRAMUSCULAR; INTRAVENOUS; SUBCUTANEOUS at 14:23

## 2022-04-15 RX ADMIN — SODIUM CHLORIDE, PRESERVATIVE FREE 10 ML: 5 INJECTION INTRAVENOUS at 22:10

## 2022-04-15 RX ADMIN — KETOROLAC TROMETHAMINE 15 MG: 30 INJECTION, SOLUTION INTRAMUSCULAR; INTRAVENOUS at 05:12

## 2022-04-15 RX ADMIN — SUCRALFATE 1 G: 1 TABLET ORAL at 12:09

## 2022-04-15 RX ADMIN — SENNOSIDES AND DOCUSATE SODIUM 1 TABLET: 50; 8.6 TABLET ORAL at 08:44

## 2022-04-15 RX ADMIN — DOBUTAMINE HYDROCHLORIDE 6 MCG/KG/MIN: 400 INJECTION INTRAVENOUS at 16:11

## 2022-04-15 RX ADMIN — SENNOSIDES AND DOCUSATE SODIUM 1 TABLET: 50; 8.6 TABLET ORAL at 18:52

## 2022-04-15 RX ADMIN — ALBUMIN (HUMAN) 12.5 G: 0.25 INJECTION, SOLUTION INTRAVENOUS at 08:46

## 2022-04-15 RX ADMIN — SODIUM CHLORIDE, PRESERVATIVE FREE 10 ML: 5 INJECTION INTRAVENOUS at 16:12

## 2022-04-15 RX ADMIN — ALBUMIN (HUMAN) 12.5 G: 0.25 INJECTION, SOLUTION INTRAVENOUS at 17:21

## 2022-04-15 RX ADMIN — WARFARIN SODIUM 5 MG: 5 TABLET ORAL at 18:52

## 2022-04-15 RX ADMIN — SUCRALFATE 1 G: 1 TABLET ORAL at 06:43

## 2022-04-15 RX ADMIN — GABAPENTIN 200 MG: 100 CAPSULE ORAL at 16:43

## 2022-04-15 RX ADMIN — GABAPENTIN 200 MG: 100 CAPSULE ORAL at 22:09

## 2022-04-15 RX ADMIN — POTASSIUM CHLORIDE 20 MEQ: 750 TABLET, EXTENDED RELEASE ORAL at 08:44

## 2022-04-15 RX ADMIN — AMIODARONE HYDROCHLORIDE 200 MG: 200 TABLET ORAL at 18:52

## 2022-04-15 RX ADMIN — AMIODARONE HYDROCHLORIDE 200 MG: 200 TABLET ORAL at 08:44

## 2022-04-15 RX ADMIN — MIRTAZAPINE 7.5 MG: 15 TABLET, FILM COATED ORAL at 22:09

## 2022-04-15 RX ADMIN — DOBUTAMINE HYDROCHLORIDE 6 MCG/KG/MIN: 400 INJECTION INTRAVENOUS at 06:44

## 2022-04-15 RX ADMIN — SODIUM CHLORIDE 6 ML/HR: 9 INJECTION, SOLUTION INTRAVENOUS at 16:10

## 2022-04-15 RX ADMIN — SUCRALFATE 1 G: 1 TABLET ORAL at 22:09

## 2022-04-15 RX ADMIN — FAMOTIDINE 20 MG: 20 TABLET ORAL at 22:09

## 2022-04-15 RX ADMIN — KETOROLAC TROMETHAMINE 15 MG: 30 INJECTION, SOLUTION INTRAMUSCULAR; INTRAVENOUS at 18:54

## 2022-04-15 RX ADMIN — SUCRALFATE 1 G: 1 TABLET ORAL at 17:17

## 2022-04-15 RX ADMIN — HYDRALAZINE HYDROCHLORIDE 50 MG: 50 TABLET, FILM COATED ORAL at 12:09

## 2022-04-15 RX ADMIN — HYDRALAZINE HYDROCHLORIDE 50 MG: 50 TABLET, FILM COATED ORAL at 05:12

## 2022-04-15 RX ADMIN — SODIUM CHLORIDE, PRESERVATIVE FREE 10 ML: 5 INJECTION INTRAVENOUS at 05:15

## 2022-04-15 RX ADMIN — COLCHICINE 0.6 MG: 0.6 TABLET, FILM COATED ORAL at 08:44

## 2022-04-15 RX ADMIN — POLYETHYLENE GLYCOL 3350 17 G: 17 POWDER, FOR SOLUTION ORAL at 08:43

## 2022-04-15 RX ADMIN — ASPIRIN 81 MG CHEWABLE TABLET 81 MG: 81 TABLET CHEWABLE at 08:43

## 2022-04-15 RX ADMIN — FAMOTIDINE 20 MG: 20 TABLET ORAL at 08:43

## 2022-04-15 RX ADMIN — LOSARTAN POTASSIUM 12.5 MG: 25 TABLET, FILM COATED ORAL at 08:44

## 2022-04-15 RX ADMIN — GABAPENTIN 200 MG: 100 CAPSULE ORAL at 08:44

## 2022-04-15 RX ADMIN — SODIUM CHLORIDE 2 G: 9 INJECTION INTRAMUSCULAR; INTRAVENOUS; SUBCUTANEOUS at 01:44

## 2022-04-15 NOTE — PROGRESS NOTES
1600 Bedside and Verbal shift change report given to Daryle Falter, RN (oncoming nurse) by Zayar Mason RN (offgoing nurse). Report included the following information SBAR, Kardex, Procedure Summary, Intake/Output, MAR, Recent Results and Cardiac Rhythm A. fib. PT at the bedside to work with pt.     1800 PICC team at the bedside for Picc line placement. 2000 Bedside and Verbal shift change report given to KIRK CRESPO (oncoming nurse) by Daryle Falter, RN (offgoing nurse). Report included the following information SBAR, Kardex, Procedure Summary, Intake/Output, MAR, Recent Results and Cardiac Rhythm A. fib.

## 2022-04-15 NOTE — ANESTHESIA PROCEDURE NOTES
CHEL  Date/Time: 4/5/2022 9:05 AM      Procedure Details: probe placement, image aquisition & interpretation    Risks and benefits discussed with the patient and plans are to proceed    Procedure Note    Performed by: Dorinda Eagle DO  Authorized by: Dorinda Eagle DO       Indications: assessment of ascending aorta and assessment of surgical repair  Modalities: 2D, CF, CWD, PWD  Probe Type: biplane and multiplane  Insertion: atraumatic  Patient Status: intubated and sedated    Echocardiographic and Doppler Measurements   Aorta  Size  Diam(cm)  Dissection PlaqueThick(mm)  Plaque Mobile    Ascending normal 2.7 No  No    Arch normal  No  No    Descending normal  No  No          Valves  Annulus  Stenosis  Area/Grad  Regurg  Leaflet   Morph  Leaflet   Motion    Aortic normal none 11/4 Max/Mean 3+ normal normal    Mitral dilated none 4/1 Max/Mean 4+ normal restricted    Tricuspid dilated none  2+ normal normal          Atria  Size  SEC (smoke)  Thrombus  Tumor  Device    Rt Atrium dilated No No  No    Lt Atrium dilated No No  No     Interatrial Septum Morphology: normal    Interventricular Septum Morphology: normal    Ventricle  Cavity Size  Cavity Dimension Hypertrophy  Thrombus  Gloal FXN  EF    RV dilated 6 No no moderately impaired     LV dilated   No severely impaired 15%       Regional Function  (1 = normal, 2 = mildly hypokinetic, 3 = severely hypokinetic, 4 = akinetic, 5 = dyskinetic) LAV - [place]   ME LAV = 0  ME LAV = 90  ME LAV = 130   Basal Sept:1 Basal Ant:1 Basal Post:1   Mid Sept:1 Mid Ant:1 Mid Post:1   Apical Sept:1 Apical Ant:1 Basal Ant Sept:1   Basal Lat:1 Basal Inf:1 Mid Ant Sept:1   Mid Lat:1 Mid Inf:1    Apical Lat:1 Apical Inf:1        Pericardium: normal    Post Intervention Follow-up Study  Ventricular Global Function: improved  Ventricular Regional Function: unchanged     Valve  Function  Regurgitation  Area    Aortic improved 0     Mitral improved 0     Tricuspid no change 2+ Prosthetic        Complications: None  Comments: Pre:  Left ventricle: The left ventricle is dilated with severely reduced systolic function and a visually estimated ejection fraction of 10-15%. There is global hypokinesis present. There is no thrombus in the cavity. Right ventricle: The right ventricle is dilated with moderate to severely reduced systolic function. The TAPSE was 1.38. The [place] was estimated at 2. Left atrium: The left atrium is dilated. There is no thrombus present in the cavity or in the left atrial appendage. Right atrium: The right atrium is dilated. There is no thrombus present in the cavity. IAS: There is no PFO. Aortic valve: There is moderate aortic valve regurgitation secondary to poor leaflet coaptation. The aortic root dimensions were within normal limits and there was no noted prolapse or restriction in leaflet movement. The jet extended to the level of the papillary muscle in the left ventricle. The pressure half time was 364-433 on separate measurements. There was early diastolic flow reversal in the descending aorta. The jet width encompassed 40% of the LVOT on color flow doppler. Mitral valve: There is moderate to severe mitral regurgitation secondary to Salas IIIb mechanism. There is systolic flow reversal in the pulmonary veins. Tricuspid valve: There is mild to moderate (2+) regurgitation secondary to mild annular dilation (4.18cm)  Pulmonic valve: There is mild insuffiencey. Aorta: Intact without evidence of dissection or plaque.       Post:  Improved left ventricular systolic function to a visually estimated ejection fraction of 20%  The right ventricle systolic function is mild to moderately reduced  The inflow cannula is in good position and exhibits laminar flow with appropriate velocities on serial measurements  The outflow cannula exhibits laminar flow with appropriate velocities on serial measurements   The aortic valve is sutured and exhibits no insuffiencey. There remains aortic valve opening at all coaptation points  There is 2+ tricuspid regurgitation  Mitral regurgitation resolved  The aorta is intact  There is no PFO    All findings communicated with surgeon.

## 2022-04-15 NOTE — PROGRESS NOTES
[address] [facility] in [facility]  Inpatient Progress Note    Patient name: Selina Truong  Patient : 1945  Patient MRN: [id number]  Consulting MD: Rk Torres MD  Date of service: 04/15/22    REASON FOR REFERRAL:  Management of LVAD     PLAN OF CARE:   68 y/o with severe ischemic cardiomyopathy, LVEF 15-20%; stage D, NYHA class IV symptoms; s/p LVAD implant with HM3 as DT due to age (23)  Postoperative course:  Bleeding intra-op; resolved  RV dysfunction requiring prolonged inotropic support with dobutamine  Superficial soft tissue infection at sternotomy; antibiotics resumed   Routine postoperative care      RECOMMENDATIONS:   POD#10 from LVAD implant  TTE shows severe TR but improved RV function; continue to optimize medically   Continue speed 4800rpms, low speed 4400  Continue dobutamine 6 mcg/kg/min, keep this dose and no plans to wean   Continue hydralazine 50 mg PO q8h   Continue decreased amiodarone to 200 mg PO twice daily due to nausea, vomiting   D/C Norvasc  Continue losartan 12.5 mg PO daily   Diuretics PRN CVP > 14 mmHg (place PICC and transduce)   Continue cefepime q12h for soft tissue infection at sternotomy site; examined by Dr. Marli Aponte  [facility]    Continue warfarin, goal INR 2-3; 5 mg tonight   ASA 81mg daily  Continue colchicine for pericarditis   Continue gabapentin 200 mg PO TID   Toradol q6h PRN and before PT/OT; OK with Dr. Marli Aponte, renal function stable  D/C fentanyl patch; use IV PRN   Driveline dressing changes daily for now until 5/3/22; scant serosanguinous drainage noted today   Continue bowel regimen  Pulmonary toilet, monitor CT output   Place CT to water seal on  if drainage stable, consider d/c on Monday    CPAP QHS  Advanced care plan forms on file   Strict I/O, daily weights, Na+ restricted diet   Continue VAD education   Equipment ordered     IMPRESSION:  S/p LVAD implant as DT on 22 with Dr. Marli Aopnte   chronic systolic heart failure  Stage D, NYHA class IV symptoms improved to class II on inotropes  Non-ischemic cardiomyopathy, LVEF 20% with LVEDD 6.2  RV dysfunction, TAPSE 1.22 (prelimnary read)  TBili 1.5 likely from cardiac congestion  At risk of sudden cardiac death  Recent cardiac arrest   S/p ICD (1/2013, [facility] followed by [address] [place] [address]); New implant on 10/21/2021 [employer]  Coronary artery disease  S/p multiple interventions  S/p 4V CABG (8/2012)  LHC (7/2019) severe stenosis of LIMA to LAD anastomosis site, SVG graft to OM is occluded, SVG graft to RCA 40-50%, LAD occluded proximally, severe proximal LCx, RCA is the long . PET (6/2019) EF 24% with anterior lateral and inferior reversible defect  Cardiac risk factors  HTN  HL  DM2  SRUTHI on CPAP  MIld carotid stenosis  Anemia, microcytic  Iron deficiency  DVT with filter  Pulmonary nodules   Dysphagia      Interval Events:  MAPs stable, no low flows in 24 hrs   Working with PT/OT   +BM    Cr 0.87   proBNP 4160 from 4588   tbili 1.0 from 1.2      CARDIAC IMAGING:  Echo (4/8/22)    Left Ventricle: Left ventricle size is normal. Moderately increased wall thickness. Findings consistent with concentric remodeling. Severe global hypokinesis present. Severely reduced left ventricular systolic function with a visually estimated EF of 10 -15%. 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 Aortic Valve: Moderate sclerosis of the aortic valve cusp. Mitral Valve: Moderately thickened leaflet. Mild transvalvular regurgitation. Tricuspid Valve: Severe transvalvular regurgitation. Right Atrium: Right atrium is severely dilated. Echo (3/2/22)   LV 10-15%  Mod-severe MR      Echo (11/23/21):  LV 15-20%. Mod-severe, MR, mod-TR     Echo (5/20/21)  LV: Estimated LVEF is 20 - 25%. Normal wall thickness. Mildly dilated left ventricle. Severely and globally reduced systolic function.  Severe (grade 4) left ventricular diastolic dysfunction. LA: Severely dilated left atrium. RA: Severely dilated right atrium. MV: Moderate mitral valve regurgitation is present. TV: Moderate tricuspid valve regurgitation is present. AO: Mild aortic root dilatation. RV: Pacer/ICD present. LVED 6.2cm     EKG (5/20/21) SB with 1degree AV block, QRS 116ms     LHC (5/24/21) Native Coronaries: LM - moderate to severe distal disease 50%. % prox occluded (), heavily calcified, LCx: 80% proximal stenosis. OM1 is  (seen filling faintly via collaterals). OM2 99% stenosis. RCA: 100% proximal occluded. LIMA to LAD: patent, supplies only a diagonal branch (probably D2). The LAD distal to the bifurcation is 100% occluded () and fills via right to left collaterals off the SVG to RCA. SVG to R-PDA: patent with moderate diffuse irregularities but no obstructive disease in the graft. No appealing interventional targets. NST as above     ICD Interrogation (11/12/2021) [employer] VVI, thoracic impedence trending up, no events, normal device function and good battery  ICD interrogation (5/12/21) [employer] single lead, no events, normal device function and good battery     HEMODYNAMICS:  RHC 5/24/21 CI 1.97, PA 33/9/17, RA 1, PCWP 6- off milrinone   CPEST not done     Patient underwent a 6 minute walk test 11/12/2021     6 Min Walk Report 11/12/2021 7/6/2021 5/20/2021   (PRE) HR 88 98 74   (PRE) O2 Sat 100 - 99   (POST)  - 81   (POST) O2 Sat 99 98% on Ra 99   Distance in Meters 386.58 - 1158.24       OTHER IMAGING:  CXR (6/17/21) clear  CT 5/21/21 1. Irregular pulmonary nodule in the left upper lobe measuring 2 cm, suspicious for primary pulmonary malignancy. 2. Additional 6 mm left upper lobe pulmonary nodule. 3. Severe calcific atherosclerosis of the coronary arteries and abdominal aorta. No aneurysm. 4. Cardiomegaly. 5. No acute abnormality within the chest, abdomen, or pelvis.       HISTORY OF PRESENT ILLNESS:  Briefly, Jo Shah is a 69 y.o. male with h/o HTN, HL, DM2, SRUTHI on CPAP, chronic systolic heart failure, stage D, NYHA class IV symptoms, non-ischemic cardiomyopathy, LVEF 20% with LVEDD 6.2, RV dysfunciton, TAPSE 1.22, TBili 1.5 likely from cardiac congestion, recent cardiac arrest s/p ICD (1/2013, [address] followed by [facility]), coronary artery disease s/p multiple interventions s/p 4V CABG (8/2012), LHC (7/2019) severe stenosis of LIMA to LAD anastomosis site, SVG graft to OM is occluded, SVG graft to RCA 40-50%, LAD occluded proximally, severe proximal LCx, RCA is the long . PET (6/2019) EF 24% with anterior lateral and inferior reversible defect. Anemia, microcytic with iron deficiency, DVT with filter. Patient was referred to [facility] by Dr. Ramu Cheek for evaluation of his candidacy for advanced therapies. Patient agreed to inpatient initiation of palliative infusion of chronic inotropes and evaluation for LVAD-DT. Patient was admitted 5/2-5/25/21. Patient was started on milrinone infusion and had first part of LVAD evaluation completed. Right and left heart cath on 5/24/21 that showed severe diffuse native vessel CAD, with patent grafts (LIMA to D2, SVG to RCA). Antiplatelet therapy was held during hospitalization and after discharge due to anticipated pulmonary nodule biopsy, bone marrow biopsy and EGD/C-Scope as part of LVAD workup. Patient was readmitted 5/26-5/28/21 with hypertension, headache and chest pain, his troponin peaked at 10; he was shortly bridged with heparin, otherwise had normal EKG and chest CT negative for PE. Cardiology and AHF service was consulted and patient was discharged home after troponin came down. Patient has now completed radiation treatment for adenocarcinoma of the lung. Hem-onc has cleared patient for VAD implant with 90% 2 year prognosis.       He was most recently admitted for elective pre-op EGD and colonoscopy which he tolerated well; path negative for malignancy. He presented [facility] on 4/3 for LVAD implant. LVAD INTERROGATION:  Device interrogated in person  Device function normal, normal flow, no events  LVAD   Pump Speed (RPM): 4800  Pump Flow (LPM): 3.4  MAP: 78 (L radial doppler)  PI (Pulsitility Index): 6.4  Power: 3.1   Test: Yes  Back Up  at Bedside & Labeled: Yes  Power Module Test: Yes  Driveline Site Care: No  Driveline Dressing: Clean, Dry, and Intact  MAP in Therapeutic Range (Outpatient): Yes  Testing  Alarms Reviewed: No  Back up SC speed: 4800  Back up Low Speed Limit: 4400  Emergency Equipment with Patient?: Yes  Emergency procedures reviewed?: Yes  Drive line site inspected?: No  Drive line intergrity inspected?: Yes  Drive line dressing changed?: No    PHYSICAL EXAM:  Visit Vitals  BP (!) 102/58   Pulse 75   Temp 98.2 °F (36.8 °C)   Resp 22   Ht 6' 1\" (1.854 m)   Wt 195 lb 15.8 oz (88.9 kg)   SpO2 97%   BMI 25.86 kg/m²     General: Patient is well developed, well-nourished in no acute distress  HEENT: Normocephalic and atraumatic. No scleral icterus. Pupils are equal, round and reactive to light and accomodation. No conjunctival injection. Oropharynx is clear. Neck: Supple. No evidence of thyroid enlargements or lymphadenopathy. JVD: Cannot be appreciated   Lungs: Breath sounds are equal and clear bilaterally. No wheezes, rhonchi, or rales. Heart: VAD hum  Abdomen: Soft, no mass or tenderness. No organomegaly or hernia. Bowel sounds present. Genitourinary and rectal: deferred  Extremities: No cyanosis, clubbing. 1+ pitting LE edema. Neurologic: No focal sensory or motor deficits are noted. Grossly intact. Psychiatric: Awake, alert an doriented x 3. Appropriate mood and affect. Skin: Warm, dry and well perfused. No lesions, nodules or rashes are noted. REVIEW OF SYSTEMS:  General: Denies fever, night sweats.   Ear, nose and throat: Denies difficulty hearing, sinus problems, runny nose, post-nasal drip, ringing in ears, mouth sores, loose teeth, ear pain, nosebleeds, sore throate, facial pain or numbess  Cardiovascular: see above in the interval history  Respiratory: Denies cough, wheezing, sputum production, hemoptysis. Gastrointestinal: + nausea, vomiting this AM.   Kidney and bladder: Denies painful urination, frequent urination, urgency, prostate problems and impotence  Musculoskeletal: Denies joint pain, muscle weakness  Skin and hair: Denies change in existing skin lesions, hair loss or increase    PAST MEDICAL HISTORY:  Past Medical History:   Diagnosis Date    CAD (coronary artery disease) '90 '97, '13 x 2    MI, code ice in 2013 at [facility]    Calculus of kidney     Colonic polyps     Congestive heart failure, unspecified     Diabetes ([facility] [address]     Gastritis     Hypercholesterolemia     Sleep apnea        PAST SURGICAL HISTORY:  Past Surgical History:   Procedure Laterality Date    COLONOSCOPY  04/06/2011    16, due 21    COLONOSCOPY N/A 3/2/2022    COLONOSCOPY    :- performed by Kita Larry MD at [facility] ENDOSCOPY    ENDOSCOPY, COLON, DIAGNOSTIC      11, due 16    HX CORONARY ARTERY BYPASS GRAFT  8/22/12    x 4 vessel by MISSY Ramirez    HX HEENT      LASIK    HX PACEMAKER PLACEMENT  1/30/13    [facility] CARDIAC SURG PROCEDURE UNLIST  2012    x 4 vessels       FAMILY HISTORY:  Family History   Problem Relation Age of Onset    Heart Disease Mother         MI    Heart Disease Father         CAD & PVD    Lung Disease Father     Cancer Father         lung    Diabetes Maternal Grandmother     Heart Disease Other         CAD    Stroke Sister        SOCIAL HISTORY:  Social History     Socioeconomic History    Marital status:    Tobacco Use    Smoking status: Never Smoker    Smokeless tobacco: Never Used   Vaping Use    Vaping Use: Never used   Substance and Sexual Activity    Alcohol use:  Yes     Alcohol/week: 0.0 standard drinks     Comment:  VERY RARE    Drug use: No       LABORATORY RESULTS:     Labs Latest Ref Rng & Units 4/15/2022 4/14/2022 4/14/2022 4/13/2022 4/12/2022 4/11/2022 4/10/2022   WBC 4.1 - 11.1 K/uL 9.2 - 7.7 8.4 9.1 8.2 7.9   RBC 4.10 - 5.70 M/uL 3.17(L) - 2.93(L) 3.33(L) 3.23(L) 3.39(L) 3.31(L)   Hemoglobin 12.1 - 17.0 g/dL 8. 2(L) 8.2(L) 7. 7(L) 8.7(L) 8.5(L) 8.8(L) 8.6(L)   Hematocrit 36.6 - 50.3 % 25. 5(L) 25. 5(L) 23. 7(L) 26. 9(L) 26. 1(L) 27. 2(L) 26. 1(L)   MCV 80.0 - 99.0 FL 80.4 - 80.9 80.8 80.8 80.2 78. 9(L)   Platelets 765 - 402 K/uL 208 - 177 178 161 144(L) 125(L)   Lymphocytes 12 - 49 % - - - - - - -   Monocytes 5 - 13 % - - - - - - -   Eosinophils 0 - 7 % - - - - - - -   Basophils 0 - 1 % - - - - - - -   Bands 0 - 6 % - - - - - - -   Albumin 3.5 - 5.0 g/dL 2. 9(L) - 2. 8(L) 2. 9(L) 2. 8(L) 2. 6(L) 3. 1(L)   Calcium 8.5 - 10.1 MG/DL 8. 3(L) - 8. 4(L) 8.7 8.5 8.2(L) 8. 1(L)   Glucose 65 - 100 mg/dL 141(H) - 115(H) 135(H) 157(H) 136(H) 135(H)   BUN 6 - 20 MG/DL 26(H) - 23(H) 22(H) 18 22(H) 29(H)   Creatinine 0.70 - 1.30 MG/DL 0.87 - 0.83 0.96 0.82 0.79 0.92   Sodium 136 - 145 mmol/L 129(L) - 130(L) 131(L) 132(L) 133(L) 132(L)   Potassium 3.5 - 5.1 mmol/L 4.6 - 4.0 4.4 4.3 4.1 3.8   TSH 0.36 - 3.74 uIU/mL - - - - - - 2.26   LDH 85 - 241 U/L 192 - 185 248(H) - 276(H) 319(H)   Some recent data might be hidden     Lab Results   Component Value Date/Time    TSH 2.26 04/10/2022 03:00 AM    TSH 1.68 03/01/2022 11:50 AM    TSH 1.47 05/26/2021 10:44 PM    TSH 1.97 05/20/2021 04:28 PM    TSH 1.41 02/09/2021 03:05 PM    TSH 1.740 08/14/2018 09:58 AM    TSH 1.710 10/18/2017 12:00 AM       ALLERGY:  Allergies   Allergen Reactions    Oxycodone Anaphylaxis    Pcn [Penicillins] Hives        CURRENT MEDICATIONS:    Current Facility-Administered Medications:     potassium chloride SR (KLOR-CON 10) tablet 20 mEq, 20 mEq, Oral, DAILY, Ben Velazquez MD, 20 mEq at 04/15/22 0844    warfarin (COUMADIN) tablet 5 mg, 5 mg, Oral, ONCE, Polliard, Renetta Prom T, NP    hydrALAZINE (APRESOLINE) tablet 50 mg, 50 mg, Oral, Q8H, Polliard, Renetta Prom T, NP, 50 mg at 04/15/22 1209    losartan (COZAAR) tablet 12.5 mg, 12.5 mg, Oral, DAILY, Polliard, Renetta Prom T, NP, 12.5 mg at 04/15/22 0844    cefepime (MAXIPIME) 2 g in 0.9% sodium chloride 10 mL IV syringe, 2 g, IntraVENous, Q12H, Polliard, Renetta Prom T, NP, 2 g at 04/15/22 1423    amiodarone (CORDARONE) tablet 200 mg, 200 mg, Oral, BID, Polliard, Renetta Prom T, NP, 200 mg at 04/15/22 0844    colchicine tablet 0.6 mg, 0.6 mg, Oral, DAILY, Polliard, Renetta Prom T, NP, 0.6 mg at 04/15/22 0844    alteplase (CATHFLO) 1 mg in sterile water (preservative free) 1 mL injection, 1 mg, InterCATHeter, PRN, Daniela Hill MD    bisacodyL (DULCOLAX) suppository 10 mg, 10 mg, Rectal, DAILY PRN, Ben Velazquez MD, 10 mg at 04/12/22 1533    gabapentin (NEURONTIN) capsule 200 mg, 200 mg, Oral, TID, Polliard, Renetta Prom T, NP, 200 mg at 04/15/22 0844    ketorolac (TORADOL) injection 15 mg, 15 mg, IntraVENous, Q6H PRN, Marvin Millie T, NP, 15 mg at 04/15/22 0512    albumin human 25% (BUMINATE) solution 12.5 g, 12.5 g, IntraVENous, BID, Polliard, Rneetta Prom T, NP, 12.5 g at 04/15/22 0846    polyethylene glycol (MIRALAX) packet 17 g, 17 g, Oral, BID, Ben Velazquez MD, 17 g at 04/15/22 0843    senna-docusate (PERICOLACE) 8.6-50 mg per tablet 1 Tablet, 1 Tablet, Oral, BID, Ben Velazquez MD, 1 Tablet at 04/15/22 0844    famotidine (PEPCID) tablet 20 mg, 20 mg, Oral, Q12H, Ben Velazquez MD, 20 mg at 04/15/22 0843    bumetanide (BUMEX) injection 1 mg, 1 mg, IntraVENous, Q6H PRN, Ben Velazquez MD, 1 mg at 04/10/22 1234    mirtazapine (REMERON) tablet 7.5 mg, 7.5 mg, Oral, QHS, Terry, Danica B, NP, 7.5 mg at 04/14/22 2139    lidocaine 4 % patch 1 Patch, 1 Patch, TransDERmal, Q24H, Terry, Danica B, NP, 1 Patch at 04/15/22 1307    Warfarin NP/MD dosing, , Other, PRN, Terry, Danica B, NP    melatonin tablet 3 mg, 3 mg, Oral, QHS PRN, Cinthia Chiu MD, 3 mg at 04/10/22 0251    hydrALAZINE (APRESOLINE) 20 mg/mL injection 10 mg, 10 mg, IntraVENous, Q6H PRN, Ole Interiano B, NP, 10 mg at 04/10/22 0245    aspirin chewable tablet 81 mg, 81 mg, Oral, DAILY, Ole Interiano B, NP, 81 mg at 04/15/22 0843    hydrALAZINE (APRESOLINE) 20 mg/mL injection 5 mg, 5 mg, IntraVENous, Q6H PRN, Ole Interiano B, NP, 5 mg at 22 9735    sodium chloride (NS) flush 5-40 mL, 5-40 mL, IntraVENous, Q8H, Polliard, Tracy [place] T, NP, 10 mL at 04/15/22 1612    sodium chloride (NS) flush 5-40 mL, 5-40 mL, IntraVENous, PRN, Polliard, Larissa Watson, NP    0.9% sodium chloride infusion, 9 mL/hr, IntraVENous, CONTINUOUS, Polliard, Tracy [place] T, NP, Last Rate: 6 mL/hr at 04/15/22 1610, 6 mL/hr at 04/15/22 1610    acetaminophen (TYLENOL) tablet 650 mg, 650 mg, Oral, Q6H PRN, Marvin, Larissa Watson, NP, 650 mg at 22 0618    naloxone (NARCAN) injection 0.4 mg, 0.4 mg, IntraVENous, PRN, Polliard, Larissa Walter, NP    ondansetron (ZOFRAN) injection 4 mg, 4 mg, IntraVENous, Q4H PRN, Polljanny, Tracy [place] T, NP, 4 mg at 22 2139    albuterol-ipratropium (DUO-NEB) 2.5 MG-0.5 MG/3 ML, 3 mL, Nebulization, Q6H PRN, Larissa Wongams, NP    midazolam (VERSED) injection 1 mg, 1 mg, IntraVENous, Q1H PRN, PolliardLarissa Walter, NP    ELECTROLYTE REPLACEMENT NOTE: Nurse to review Serum Potassium and Magnesuim levels and Initiate Electrolyte Replacement Protocol as needed, 1 Each, Other, PRN, Polliard, Tracy [place] T, NP    insulin regular (NOVOLIN R, HUMULIN R) 100 Units in 0.9% sodium chloride 100 mL infusion, 0-50 Units/hr, IntraVENous, TITRATE, Larissa Wong NP, Stopped at 22 1619    glucose chewable tablet 16 g, 4 Tablet, Oral, PRN, Larissa Wong NP    glucagon (GLUCAGEN) injection 1 mg, 1 mg, IntraMUSCular, PRN, Tracy Wong, NP    insulin lispro (HUMALOG) injection, , SubCUTAneous, AC&HS, Larissa Wong NP, 2 Units at 22 1622    dextrose 10 % infusion 0-250 mL, 0-250 mL, IntraVENous, PRN, Larissa Wong NP, Last Rate: 999 mL/hr at 22 2145, 45 mL at 04/07/22 2145    sucralfate (CARAFATE) tablet 1 g, 1 g, Oral, AC&HS, Pernell Wong NP, 1 g at 04/15/22 1209    0.45% sodium chloride infusion, 10 mL/hr, IntraVENous, CONTINUOUS, Taya Najera MD, Stopped at 04/10/22 0730    fentaNYL citrate (PF) injection 25 mcg, 25 mcg, IntraVENous, Q2H PRN, Pernell Wong NP, 25 mcg at 04/14/22 0829    DOBUTamine (DOBUTREX) 1,000 mg/250 mL (4,000 mcg/mL) infusion, 0-10 mcg/kg/min, IntraVENous, TITRATE, Pernell Wong NP, Last Rate: 6.8 mL/hr at 04/15/22 1611, 6 mcg/kg/min at 04/15/22 1611    PATIENT CARE TEAM:  Patient Care Team:  Leida Molina MD as PCP - General (Internal Medicine)  Leida Molina MD as PCP - [facility]  Marilyn Aguillon MD as Physician (Cardiology)  Lucho Escalante MD as Physician (Gastroenterology)  Nieves Gilliland MD as Physician (Orthopedic Surgery)  Anderson Mae MD as Physician (Ophthalmology)  Salvador Green MD (Cardiology)  Ashley Guerra MD (Cardiology)      Thank you for allowing me to participate in this patient's care. Carlos Dobbins NP   [address] [place] [address], [address]  Phone: [phone]    [facility] ATTENDING ADDENDUM    Patient was seen and examined in person. Data and notes were reviewed. I have discussed and agree with the plan as noted in the NP note above without further additions.     Mackenzie Dickens MD PhD  Martha Aquino

## 2022-04-15 NOTE — PROGRESS NOTES
Problem: Mobility Impaired (Adult and Pediatric)  Goal: *Acute Goals and Plan of Care (Insert Text)  Description: FUNCTIONAL STATUS PRIOR TO ADMISSION: Patient was independent and active without use of DME.    HOME SUPPORT PRIOR TO ADMISSION: The patient lived with his wife but did not require assist.    Physical Therapy Goals  Weekly Reassessment 4/15/2022 (goals progressed)  1. Patient will move from supine to sit and sit to supine , scoot up and down, and roll side to side in bed with head of bed flat with supervision/set-up within 7 days. 2.  Patient will perform sit to/from stand from chair height with supervision/set-up within 7 days. 3.  Patient will ambulate 300 feet stand by assist within 7 days. 4.  Patient will ascend/descend 6 stairs with handrail(s) with minimal assistance/contact guard assist within 14 days. 5.  Patient will perform cardiac exercises per protocol with supervision/set-up within 7 days. 6.  Patient will verbally and functionally recall 3/3 sternal precautions within 7 days. 7.  Patient will perform power exchange for power module to/from battery with stand by assist within 7 days. Initiated 4/8/2022  1. Patient will move from supine to sit and sit to supine , scoot up and down, and roll side to side in bed with supervision/set-up within 7 days. 2.  Patient will perform sit to/from stand with supervision/set-up within 7 days. 3.  Patient will ambulate 50 feet with least restrictive assistive device and minimal assistance/contact guard assist within 7 days. 4.  Patient will ascend/descend 6 stairs with handrail(s) with minimal assistance/contact guard assist within 14 days. 5.  Patient will perform cardiac exercises per protocol with supervision/set-up within 7 days. 6.  Patient will verbally and functionally recall 3/3 sternal precautions within 7 days.   7.  Patient will perform power exchange for power module to/from battery with minimal assistance within 7 days.    Outcome: Progressing Towards Goal     PHYSICAL THERAPY TREATMENT: WEEKLY REASSESSMENT  Patient: Selina Truong (89 y.o. male)  Date: 4/15/2022  Primary Diagnosis: HFrEF (heart failure with reduced ejection fraction) ([facility]) [I50.20]  Procedure(s) (LRB):  REDO STERNOTOMY; RIGHT GROIN CUTDOWN FOR CANNULATION;  INSERTION OF LEFT VENTRICULAR ASSIST DEVICE (HMIII); AORTIC VALVE CLOSURE CHEL BY DR. James Armstrong. (N/A) 10 Days Post-Op   Precautions:  Fall (move in the tube, LVAD)      ASSESSMENT  Patient continues with skilled PT services and is progressing towards goals. Functional mobility remains limited by mildly impaired balance, activity tolerance, sternal precautions. Received pt in bed w/ two family members in the room. Patient completed switch over to/ from battery and PM w/ supv. He had difficulty w/ LVAD terminology today and directing this PT to retrieve and replace the batteries. Patient held the  today, does not want to hang it around his neck d/t the MAC. Patient ambulated 180 ft with steady gait (in hosp socks), no LOB, improved milton, no rest breaks. He was mildly SOB after. Pt returned to bed post session for procedure. Patient's progression toward goals since last assessment: Goals progressed    Current Level of Function Impacting Discharge (mobility/balance): as documented     Functional Outcome Measure: The patient scored 20/28 on the Tinetti outcome measure which is indicative of moderate. Other factors to consider for discharge: lvad, sternotomy         PLAN :  Goals have been updated based on progression since last assessment. Patient continues to benefit from skilled intervention to address the above impairments.     Recommendations and Planned Interventions: bed mobility training, transfer training, gait training, therapeutic exercises, patient and family training/education and therapeutic activities      Frequency/Duration: Patient will be followed by physical therapy:  Daily    Recommendation for discharge: (in order for the patient to meet his/her long term goals)  Physical therapy at least 2 days/week in the home AND ensure assist and/or supervision for safety with lvad and mobility    This discharge recommendation:  Has been made in collaboration with the attending provider and/or case management    IF patient discharges home will need the following DME: none anticipated         SUBJECTIVE:   Patient stated [address] [place] we do.   (referring to his walk)    OBJECTIVE DATA SUMMARY:   HISTORY:    Past Medical History:   Diagnosis Date    CAD (coronary artery disease) '90 '97, '13 x 2    [place] [facility]   [place] Calculus of kidney     Colonic polyps     Congestive heart failure, unspecified     Diabetes ([facility] [address]     Gastritis     Hypercholesterolemia     Sleep apnea      Past Surgical History:   Procedure Laterality Date    COLONOSCOPY  04/06/2011    16, due 21    COLONOSCOPY N/A 3/2/2022    COLONOSCOPY    :- performed [facility] Em [facility] at [facility] ENDOSCOPY    ENDOSCOPY, COLON, DIAGNOSTIC      11, due 16    HX CORONARY ARTERY BYPASS GRAFT  8/22/12    x 4 vessel by MISSY Ramirez    HX HEENT      LASIK    HX PACEMAKER PLACEMENT  1/30/13    [place] CARDIAC SURG PROCEDURE UNLIST  2012    x 4 vessels       Personal factors and/or comorbidities impacting plan of care: PMH/ HPI    Home Situation  Home Environment: Private residence  # Steps to Enter: 1 (high step)  Rails to Enter: Yes (pole)  One/Two Story Residence: Two story  # of Interior Steps: 12 (6 up & 6 down)  Living Alone: No  Support Systems: Spouse/Significant Other  Patient Expects to be Discharged to[de-identified] Home  Current DME Used/Available at Home: Cane, straight  Tub or Shower Type: Shower    EXAMINATION/PRESENTATION/DECISION MAKING:   Critical Behavior:  Neurologic State: Alert  Orientation Level: Oriented X4  Cognition: Appropriate decision making     Hearing:   Auditory  Auditory Impairment: None    Range Of Motion:  Grossly functional                       Strength:    Decreased, functional                  Coordination:  Functional  Functional Mobility:  Bed Mobility:  Supine to Sit: Stand-by assistance;Bed Modified (for line management)  Sit to Supine: Minimum assistance (for LE management)     Transfers:  Sit to Stand: Contact guard assistance (from bed)  Stand to Sit: Contact guard assistance (to bed)                       Balance:   Sitting: Intact  Standing: Impaired  Standing - Static: Good; Unsupported  Standing - Dynamic : Good;Fair;Unsupported  Ambulation/Gait Training:  Distance (ft): 180 Feet (ft)  Assistive Device: Gait belt                    Base of Support: Other (comment) (improved center of gravity today)                   VAD power transition  Patient performed switchover from power module to battery. Completed the following tasks:   Independent Verbal cues Physical assist Comments   Don holster vest   x    Check batteries  x     Check        Ensure  clipped onto stabilization belt   x    Position batteries in clips x      Disconnect power leads x      Insert power lead into battery x      [place] batteries in holster   x x    Secure batteries with velcro and clips  x x      Patient performed switchover from battery to power module.   Completed the following tasks:   Independent Verbal cues Physical assistance Comments   Remove batteries from holster  x x    Disconnect power leads from battery x      Reconnect power leads into power module x      Remove batteries from clips x      Doff holster vest   x        Functional Measure:  Tinetti test:    Sitting Balance: 1  Arises: 1  Attempts to Rise: 1  Immediate Standing Balance: 2  Standing Balance: 1  Nudged: 0 (not assessed)  Eyes Closed: 0 (not assessed)  Turn 360 Degrees - Continuous/Discontinuous: 1  Turn 360 Degrees - Steady/Unsteady: 1  Sitting Down: 1  Balance Score: 9 Balance total score  Indication of Gait: 1  R Step Length/Height: 1  L Step Length/Height: 1  R Foot Clearance: 1  L Foot Clearance: 1  Step Symmetry: 1  Step Continuity: 1  Path: 2  Trunk: 2  Walking Time: 0  Gait Score: 11 Gait total score  Total Score: 20/28 Overall total score         Tinetti Tool Score Risk of Falls  <19 = High Fall Risk  19-24 = Moderate Fall Risk  25-28 = Low Fall Risk  Tinetti ME. Performance-Oriented Assessment of Mobility Problems in Elderly Patients. [address]; [id number]. (Scoring Description: PT Bulletin Feb. 10, 1993)    Older adults: Meron Neves et al, 2009; n = 1000 [facility] elderly evaluated with ABC, GERA, ADL, and IADL)  · Mean GERA score for males aged 69-68 years = 26.21(3.40)  · Mean GERA score for females age 69-68 years = 25.16(4.30)  · Mean GERA score for males over 80 years = 23.29(6.02)  · Mean GERA score for females over 80 years = 17.20(8.32)       Pain Rating:  None voiced    Activity Tolerance:   Fair and observed SOB with activity    After treatment patient left in no apparent distress:   Supine in bed, Call bell within reach, Caregiver / family present and Side rails x 3    COMMUNICATION/EDUCATION:   The patients plan of care was discussed with: Registered nurse. Patient/family agree to work toward stated goals and plan of care.     Thank you for this referral.  Bong Kent, PT   Time Calculation: 40 mins

## 2022-04-15 NOTE — PROCEDURES
PICC placement note:    PRE-PROCEDURE VERIFICATION  Correct Procedure: yes  Correct Site:  yes  Temperature: Temp: 98.4 °F (36.9 °C), Temperature Source: Temp Source: Oral  Recent Labs     04/15/22  1452 04/15/22  0438 04/15/22  0438   BUN  --   --  26*   CREA  --   --  0.87   PLT  --   --  208   INR 1.9*   < > 1.9*   WBC  --   --  9.2    < > = values in this interval not displayed. Allergies: Oxycodone and Pcn [penicillins]  Education materials, including PICC Booklet, for PICC Care given to patient: yes. See Patient Education activity for further details. PROCEDURE DETAIL  PICC placed using Modified Seldinger Technique. A triple lumen PICC line was started for vascular access. The following documentation is in addition to the PICC properties in the lines/airways flowsheet :  Lot #: [id number]  Was xylocaine 1% used intradermally:  yes  Catheter Length: 40 (cm)  Vein Selection for PICC:right brachial  Central Line Bundle followed yes  Complication Related to Insertion: none  The placement was verified by CXR technology: The  tip location is on the right side and the tip is in the atrial/caval superior vena cava. See CXR results for PICC tip placement. Report given to nurse    Candance Pollen is okay to use.     Marilyn Fink RN

## 2022-04-15 NOTE — PROGRESS NOTES
0730- Bedside and Verbal shift change report given to Diandra Mccloud (oncoming nurse) by Geovanni Stevenson (offgoing nurse). Report included the following information SBAR, Kardex, OR Summary, Intake/Output, MAR, Recent Results, Cardiac Rhythm a fib and Alarm Parameters . 1010- Dr Blas Saavedra and team at bedside, orders received. 1500- LLQ LVAD drive line site dressing changed under sterile technique by Christopher Toro. Noted scant amount of serosanguinous drainage from site, Bambi Stevens NP notified, no new orders given. 1600- Bedside and Verbal shift change report given to [address] [place] [address] Rd (oncoming nurse) by Sadiq Hill and Ansley Xavier RN (offgoing nurse). Report included the following information SBAR, Intake/Output, MAR, Recent Results, Cardiac Rhythm afib and Alarm Parameters .

## 2022-04-15 NOTE — PROGRESS NOTES
Problem: Falls - Risk of  Goal: *Absence of Falls  Description: Document Ary Gannon Fall Risk and appropriate interventions in the flowsheet. Outcome: Progressing Towards Goal  Note: Fall Risk Interventions:  Mobility Interventions: Communicate number of staff needed for ambulation/transfer,OT consult for ADLs,PT Consult for mobility concerns         Medication Interventions: Evaluate medications/consider consulting pharmacy    Elimination Interventions: Patient to call for help with toileting needs              Problem: Patient Education: Go to Patient Education Activity  Goal: Patient/Family Education  Outcome: Progressing Towards Goal     Problem: Diabetes Self-Management  Goal: *Disease process and treatment process  Description: Define diabetes and identify own type of diabetes; list 3 options for treating diabetes. Outcome: Progressing Towards Goal  Goal: *Incorporating nutritional management into lifestyle  Description: Describe effect of type, amount and timing of food on blood glucose; list 3 methods for planning meals. Outcome: Progressing Towards Goal  Goal: *Incorporating physical activity into lifestyle  Description: State effect of exercise on blood glucose levels. Outcome: Progressing Towards Goal  Goal: *Developing strategies to promote health/change behavior  Description: Define the ABC's of diabetes; identify appropriate screenings, schedule and personal plan for screenings. Outcome: Progressing Towards Goal  Goal: *Using medications safely  Description: State effect of diabetes medications on diabetes; name diabetes medication taking, action and side effects. Outcome: Progressing Towards Goal  Goal: *Monitoring blood glucose, interpreting and using results  Description: Identify recommended blood glucose targets  and personal targets.   Outcome: Progressing Towards Goal  Goal: *Prevention, detection, treatment of acute complications  Description: List symptoms of hyper- and hypoglycemia; describe how to treat low blood sugar and actions for lowering  high blood glucose level. Outcome: Progressing Towards Goal  Goal: *Prevention, detection and treatment of chronic complications  Description: Define the natural course of diabetes and describe the relationship of blood glucose levels to long term complications of diabetes. Outcome: Progressing Towards Goal  Goal: *Developing strategies to address psychosocial issues  Description: Describe feelings about living with diabetes; identify support needed and support network  Outcome: Progressing Towards Goal  Goal: *Insulin pump training  Outcome: Progressing Towards Goal  Goal: *Sick day guidelines  Outcome: Progressing Towards Goal  Goal: *Patient Specific Goal (EDIT GOAL, INSERT TEXT)  Outcome: Progressing Towards Goal     Problem: Patient Education: Go to Patient Education Activity  Goal: Patient/Family Education  Outcome: Progressing Towards Goal     Problem: Pressure Injury - Risk of  Goal: *Prevention of pressure injury  Description: Document Jl Scale and appropriate interventions in the flowsheet.   Outcome: Progressing Towards Goal  Note: Pressure Injury Interventions:  Sensory Interventions: Assess need for specialty bed,Assess changes in LOC    Moisture Interventions: Apply protective barrier, creams and emollients,Assess need for specialty bed    Activity Interventions: Increase time out of bed,PT/OT evaluation,Pressure redistribution bed/mattress(bed type)    Mobility Interventions: PT/OT evaluation,Pressure redistribution bed/mattress (bed type)    Nutrition Interventions: Document food/fluid/supplement intake,Discuss nutritional consult with provider    Friction and Shear Interventions: Foam dressings/transparent film/skin sealants,HOB 30 degrees or less,Lift sheet                Problem: Patient Education: Go to Patient Education Activity  Goal: Patient/Family Education  Outcome: Progressing Towards Goal     Problem: Patient Education: Go to Patient Education Activity  Goal: Patient/Family Education  Outcome: Progressing Towards Goal     Problem: Patient Education: Go to Patient Education Activity  Goal: Patient/Family Education  Outcome: Progressing Towards Goal

## 2022-04-15 NOTE — ANESTHESIA POSTPROCEDURE EVALUATION
Post-Anesthesia Evaluation and Assessment    Patient: Rafaela Gamble MRN: [id number]  SSN: [id number]    YOB: 1945  Age: 69 y.o. Sex: male      I have evaluated the patient and they are stable in the ICU. Cardiovascular Function/Vital Signs  HR: 83   BP: 106/69 (84)  PAP: 31/17 (23)  CVP: 13  SpO2: 100% on mechanical ventilation  RR: 16  Temp: 36.7 C    Patient is status post General anesthesia for Procedure(s):  REDO STERNOTOMY; RIGHT GROIN CUTDOWN FOR CANNULATION;  INSERTION OF LEFT VENTRICULAR ASSIST DEVICE (HMIII); AORTIC VALVE CLOSURE CHEL BY DR. Sagrario Schneider. .    Nausea/Vomiting: None    Postoperative hydration reviewed and adequate. Pain:   Managed    Neurological Status:    Intubated and sedated     Pulmonary Status:   Intubated with adequate ventilation and oxygenation     Complications related to anesthesia: None    Post-anesthesia assessment completed.  No concerns      Signed By: Glenn Bright DO     April 5, 2022

## 2022-04-15 NOTE — PROGRESS NOTES
1600 Bedside and Verbal shift change report given to Heide Garces RN (oncoming nurse) by Salud Ibrahim RN (offgoing nurse). Report included the following information SBAR, Kardex, Procedure Summary, Intake/Output, MAR, Recent Results and Cardiac Rhythm A. fib. PT at the bedside to work with pt.     1800 PICC team at the bedside for Picc line placement. 2000 Bedside and Verbal shift change report given to Leelee Allan RN (oncoming nurse) by Heide Garces RN (offgoing nurse). Report included the following information SBAR, Kardex, Procedure Summary, Intake/Output, MAR, Recent Results and Cardiac Rhythm A. fib.

## 2022-04-15 NOTE — ANESTHESIA PROCEDURE NOTES
Arterial Line Placement    Start time: 4/5/2022 7:35 AM  End time: 4/5/2022 7:37 AM  Performed by: Yessica Rice DO  Authorized by: Yessica Rice DO     Pre-Procedure  Indications:  Arterial pressure monitoring and blood sampling  Preanesthetic Checklist: patient identified, risks and benefits discussed, anesthesia consent, site marked, patient being monitored, timeout performed and patient being monitored      Procedure:   Prep:  ChloraPrep  Seldinger Technique?: Yes    Orientation:  Left  Location:  Radial artery  Catheter size:  20 G  Number of attempts:  1    Assessment:   Post-procedure:  Line secured and sterile dressing applied  Patient Tolerance:  Patient tolerated the procedure well with no immediate complications

## 2022-04-16 ENCOUNTER — APPOINTMENT (OUTPATIENT)
Dept: GENERAL RADIOLOGY | Age: 77
DRG: 001 | End: 2022-04-16
Attending: NURSE PRACTITIONER
Payer: MEDICARE

## 2022-04-16 LAB
ALBUMIN SERPL-MCNC: 2.7 G/DL (ref 3.5–5)
ALBUMIN/GLOB SERPL: 0.9 {RATIO} (ref 1.1–2.2)
ALP SERPL-CCNC: 110 U/L (ref 45–117)
ALT SERPL-CCNC: 26 U/L (ref 12–78)
ANION GAP SERPL CALC-SCNC: 6 MMOL/L (ref 5–15)
APTT PPP: 38.7 SEC (ref 22.1–31)
AST SERPL-CCNC: 48 U/L (ref 15–37)
BILIRUB SERPL-MCNC: 0.9 MG/DL (ref 0.2–1)
BNP SERPL-MCNC: 3751 PG/ML
BUN SERPL-MCNC: 28 MG/DL (ref 6–20)
BUN/CREAT SERPL: 33 (ref 12–20)
CALCIUM SERPL-MCNC: 8.4 MG/DL (ref 8.5–10.1)
CHLORIDE SERPL-SCNC: 99 MMOL/L (ref 97–108)
CO2 SERPL-SCNC: 23 MMOL/L (ref 21–32)
CREAT SERPL-MCNC: 0.85 MG/DL (ref 0.7–1.3)
ERYTHROCYTE [DISTWIDTH] IN BLOOD BY AUTOMATED COUNT: 22.5 % (ref 11.5–14.5)
GLOBULIN SER CALC-MCNC: 3 G/DL (ref 2–4)
GLUCOSE BLD STRIP.AUTO-MCNC: 116 MG/DL (ref 65–117)
GLUCOSE BLD STRIP.AUTO-MCNC: 157 MG/DL (ref 65–117)
GLUCOSE BLD STRIP.AUTO-MCNC: 179 MG/DL (ref 65–117)
GLUCOSE BLD STRIP.AUTO-MCNC: 235 MG/DL (ref 65–117)
GLUCOSE BLD STRIP.AUTO-MCNC: 266 MG/DL (ref 65–117)
GLUCOSE SERPL-MCNC: 118 MG/DL (ref 65–100)
HCT VFR BLD AUTO: 23.6 % (ref 36.6–50.3)
HGB BLD-MCNC: 7.7 G/DL (ref 12.1–17)
INR PPP: 1.8 (ref 0.9–1.1)
LACTATE SERPL-SCNC: 0.8 MMOL/L (ref 0.4–2)
LDH SERPL L TO P-CCNC: 201 U/L (ref 85–241)
MAGNESIUM SERPL-MCNC: 2.2 MG/DL (ref 1.6–2.4)
MCH RBC QN AUTO: 26.2 PG (ref 26–34)
MCHC RBC AUTO-ENTMCNC: 32.6 G/DL (ref 30–36.5)
MCV RBC AUTO: 80.3 FL (ref 80–99)
NRBC # BLD: 0 K/UL (ref 0–0.01)
NRBC BLD-RTO: 0 PER 100 WBC
PLATELET # BLD AUTO: 203 K/UL (ref 150–400)
PMV BLD AUTO: 10.2 FL (ref 8.9–12.9)
POTASSIUM SERPL-SCNC: 4.8 MMOL/L (ref 3.5–5.1)
PROT SERPL-MCNC: 5.7 G/DL (ref 6.4–8.2)
PROTHROMBIN TIME: 17.8 SEC (ref 9–11.1)
RBC # BLD AUTO: 2.94 M/UL (ref 4.1–5.7)
SERVICE CMNT-IMP: ABNORMAL
SERVICE CMNT-IMP: NORMAL
SODIUM SERPL-SCNC: 128 MMOL/L (ref 136–145)
THERAPEUTIC RANGE,PTTT: ABNORMAL SECS (ref 58–77)
WBC # BLD AUTO: 8.8 K/UL (ref 4.1–11.1)

## 2022-04-16 PROCEDURE — 82962 GLUCOSE BLOOD TEST: CPT

## 2022-04-16 PROCEDURE — 83735 ASSAY OF MAGNESIUM: CPT

## 2022-04-16 PROCEDURE — 99291 CRITICAL CARE FIRST HOUR: CPT | Performed by: NURSE PRACTITIONER

## 2022-04-16 PROCEDURE — 83615 LACTATE (LD) (LDH) ENZYME: CPT

## 2022-04-16 PROCEDURE — 83605 ASSAY OF LACTIC ACID: CPT

## 2022-04-16 PROCEDURE — 74011636637 HC RX REV CODE- 636/637: Performed by: NURSE PRACTITIONER

## 2022-04-16 PROCEDURE — 87205 SMEAR GRAM STAIN: CPT

## 2022-04-16 PROCEDURE — 71045 X-RAY EXAM CHEST 1 VIEW: CPT

## 2022-04-16 PROCEDURE — 74011250637 HC RX REV CODE- 250/637: Performed by: NURSE PRACTITIONER

## 2022-04-16 PROCEDURE — 74011250636 HC RX REV CODE- 250/636: Performed by: NURSE PRACTITIONER

## 2022-04-16 PROCEDURE — 77030013797 HC KT TRNSDUC PRSSR EDWD -A

## 2022-04-16 PROCEDURE — 85610 PROTHROMBIN TIME: CPT

## 2022-04-16 PROCEDURE — 85730 THROMBOPLASTIN TIME PARTIAL: CPT

## 2022-04-16 PROCEDURE — 74011250636 HC RX REV CODE- 250/636: Performed by: THORACIC SURGERY (CARDIOTHORACIC VASCULAR SURGERY)

## 2022-04-16 PROCEDURE — 80053 COMPREHEN METABOLIC PANEL: CPT

## 2022-04-16 PROCEDURE — 74011000250 HC RX REV CODE- 250: Performed by: NURSE PRACTITIONER

## 2022-04-16 PROCEDURE — 85027 COMPLETE CBC AUTOMATED: CPT

## 2022-04-16 PROCEDURE — 74011000250 HC RX REV CODE- 250

## 2022-04-16 PROCEDURE — 36415 COLL VENOUS BLD VENIPUNCTURE: CPT

## 2022-04-16 PROCEDURE — 74011250637 HC RX REV CODE- 250/637: Performed by: INTERNAL MEDICINE

## 2022-04-16 PROCEDURE — 74011000250 HC RX REV CODE- 250: Performed by: THORACIC SURGERY (CARDIOTHORACIC VASCULAR SURGERY)

## 2022-04-16 PROCEDURE — 97530 THERAPEUTIC ACTIVITIES: CPT

## 2022-04-16 PROCEDURE — 2709999900 HC NON-CHARGEABLE SUPPLY

## 2022-04-16 PROCEDURE — P9047 ALBUMIN (HUMAN), 25%, 50ML: HCPCS | Performed by: NURSE PRACTITIONER

## 2022-04-16 PROCEDURE — 77030037877 HC DRSG MEPILEX >48IN BORD MOLN -A

## 2022-04-16 PROCEDURE — 97116 GAIT TRAINING THERAPY: CPT

## 2022-04-16 PROCEDURE — 65610000003 HC RM ICU SURGICAL

## 2022-04-16 PROCEDURE — 83880 ASSAY OF NATRIURETIC PEPTIDE: CPT

## 2022-04-16 RX ORDER — BACITRACIN 500 UNIT/G
PACKET (EA) TOPICAL
Status: COMPLETED
Start: 2022-04-16 | End: 2022-04-16

## 2022-04-16 RX ORDER — AMOXICILLIN 250 MG
1 CAPSULE ORAL
Status: DISCONTINUED | OUTPATIENT
Start: 2022-04-16 | End: 2022-05-06 | Stop reason: HOSPADM

## 2022-04-16 RX ORDER — OXYMETAZOLINE HCL 0.05 %
2 SPRAY, NON-AEROSOL (ML) NASAL
Status: DISCONTINUED | OUTPATIENT
Start: 2022-04-16 | End: 2022-05-06 | Stop reason: HOSPADM

## 2022-04-16 RX ORDER — POLYETHYLENE GLYCOL 3350 17 G/17G
17 POWDER, FOR SOLUTION ORAL DAILY
Status: DISCONTINUED | OUTPATIENT
Start: 2022-04-17 | End: 2022-05-06 | Stop reason: HOSPADM

## 2022-04-16 RX ORDER — POTASSIUM CHLORIDE 750 MG/1
10 TABLET, FILM COATED, EXTENDED RELEASE ORAL DAILY
Status: DISCONTINUED | OUTPATIENT
Start: 2022-04-17 | End: 2022-04-20

## 2022-04-16 RX ADMIN — SUCRALFATE 1 G: 1 TABLET ORAL at 17:25

## 2022-04-16 RX ADMIN — AMIODARONE HYDROCHLORIDE 200 MG: 200 TABLET ORAL at 08:50

## 2022-04-16 RX ADMIN — SODIUM CHLORIDE 2 G: 9 INJECTION INTRAMUSCULAR; INTRAVENOUS; SUBCUTANEOUS at 03:00

## 2022-04-16 RX ADMIN — SODIUM CHLORIDE, PRESERVATIVE FREE 10 ML: 5 INJECTION INTRAVENOUS at 21:34

## 2022-04-16 RX ADMIN — ACETAMINOPHEN 650 MG: 325 TABLET ORAL at 08:34

## 2022-04-16 RX ADMIN — FAMOTIDINE 20 MG: 20 TABLET ORAL at 21:24

## 2022-04-16 RX ADMIN — ASPIRIN 81 MG CHEWABLE TABLET 81 MG: 81 TABLET CHEWABLE at 08:49

## 2022-04-16 RX ADMIN — SUCRALFATE 1 G: 1 TABLET ORAL at 21:24

## 2022-04-16 RX ADMIN — ALBUMIN (HUMAN) 12.5 G: 0.25 INJECTION, SOLUTION INTRAVENOUS at 08:41

## 2022-04-16 RX ADMIN — POLYETHYLENE GLYCOL 3350 17 G: 17 POWDER, FOR SOLUTION ORAL at 08:49

## 2022-04-16 RX ADMIN — HYDRALAZINE HYDROCHLORIDE 50 MG: 50 TABLET, FILM COATED ORAL at 21:24

## 2022-04-16 RX ADMIN — AMIODARONE HYDROCHLORIDE 200 MG: 200 TABLET ORAL at 18:28

## 2022-04-16 RX ADMIN — Medication 3 UNITS: at 12:03

## 2022-04-16 RX ADMIN — WARFARIN SODIUM 3 MG: 2 TABLET ORAL at 18:28

## 2022-04-16 RX ADMIN — SUCRALFATE 1 G: 1 TABLET ORAL at 11:55

## 2022-04-16 RX ADMIN — ALBUMIN (HUMAN) 12.5 G: 0.25 INJECTION, SOLUTION INTRAVENOUS at 18:29

## 2022-04-16 RX ADMIN — DOBUTAMINE HYDROCHLORIDE 6 MCG/KG/MIN: 400 INJECTION INTRAVENOUS at 05:19

## 2022-04-16 RX ADMIN — COLCHICINE 0.6 MG: 0.6 TABLET, FILM COATED ORAL at 08:50

## 2022-04-16 RX ADMIN — GABAPENTIN 200 MG: 100 CAPSULE ORAL at 17:25

## 2022-04-16 RX ADMIN — LOSARTAN POTASSIUM 12.5 MG: 25 TABLET, FILM COATED ORAL at 08:51

## 2022-04-16 RX ADMIN — BACITRACIN: 500 OINTMENT TOPICAL at 07:00

## 2022-04-16 RX ADMIN — SODIUM CHLORIDE, PRESERVATIVE FREE 10 ML: 5 INJECTION INTRAVENOUS at 06:00

## 2022-04-16 RX ADMIN — FENTANYL CITRATE 25 MCG: 0.05 INJECTION, SOLUTION INTRAMUSCULAR; INTRAVENOUS at 08:34

## 2022-04-16 RX ADMIN — Medication 1 UNITS: at 21:33

## 2022-04-16 RX ADMIN — KETOROLAC TROMETHAMINE 15 MG: 30 INJECTION, SOLUTION INTRAMUSCULAR; INTRAVENOUS at 04:37

## 2022-04-16 RX ADMIN — HYDRALAZINE HYDROCHLORIDE 50 MG: 50 TABLET, FILM COATED ORAL at 04:37

## 2022-04-16 RX ADMIN — SENNOSIDES AND DOCUSATE SODIUM 1 TABLET: 50; 8.6 TABLET ORAL at 08:51

## 2022-04-16 RX ADMIN — WATER 1 MG: 1 INJECTION INTRAMUSCULAR; INTRAVENOUS; SUBCUTANEOUS at 11:49

## 2022-04-16 RX ADMIN — GABAPENTIN 200 MG: 100 CAPSULE ORAL at 08:51

## 2022-04-16 RX ADMIN — SUCRALFATE 1 G: 1 TABLET ORAL at 08:26

## 2022-04-16 RX ADMIN — SODIUM CHLORIDE, PRESERVATIVE FREE 10 ML: 5 INJECTION INTRAVENOUS at 14:22

## 2022-04-16 RX ADMIN — FAMOTIDINE 20 MG: 20 TABLET ORAL at 08:50

## 2022-04-16 RX ADMIN — SODIUM CHLORIDE 2 G: 9 INJECTION INTRAMUSCULAR; INTRAVENOUS; SUBCUTANEOUS at 14:20

## 2022-04-16 RX ADMIN — MIRTAZAPINE 7.5 MG: 15 TABLET, FILM COATED ORAL at 21:24

## 2022-04-16 RX ADMIN — HYDRALAZINE HYDROCHLORIDE 50 MG: 50 TABLET, FILM COATED ORAL at 12:55

## 2022-04-16 RX ADMIN — WARFARIN SODIUM 3 MG: 1 TABLET ORAL at 11:08

## 2022-04-16 RX ADMIN — FENTANYL CITRATE 25 MCG: 0.05 INJECTION, SOLUTION INTRAMUSCULAR; INTRAVENOUS at 21:33

## 2022-04-16 RX ADMIN — GABAPENTIN 200 MG: 100 CAPSULE ORAL at 21:24

## 2022-04-16 NOTE — PROGRESS NOTES
[facility] in [place], [place]  Inpatient Progress Note      Patient name: Antonio Bradshaw  Patient : 1945  Patient MRN: [id number]  Consulting MD: Yolanda Trammell MD  Date of service: 22      REASON FOR REFERRAL:  Management of chronic systolic heart failure      ASSESSMENT:   · 69 y.o. 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     · Now POD 11 s/p LVAD implant with HM3 as DT due to age (22)   Post-op course complicated by RV failure requiring prolonged inotropic support with dobutamine, congestive hepatopathy, superficial soft tissue infection at sternotomy incision requiring resumption of antibiotics      PLAN:  POD#11 from LVAD implant  TTE shows severe TR but improved RV function; continue to optimize medically   Continue speed 4800rpms, low speed 4400  Continue dobutamine 6 mcg/kg/min, keep this dose and no plans to wean this weekend  Continue hydralazine 50 mg PO q8h   Continue decreased amiodarone to 200 mg PO twice daily due to nausea, vomiting   Continue losartan 12.5 mg PO daily   Diuretics PRN CVP > 14 mmHg (monitor via PICC)   D/c art line, monitor MAP via doppler  Continue cefepime q12h for soft tissue infection at sternotomy site; examined by Dr. Dillon godoy    Continue warfarin, goal INR 2-3; 3mg now and then 3mg tonight   ASA 81mg daily  Continue colchicine for pericarditis   Continue gabapentin 200 mg PO TID   Toradol q6h PRN and before PT/OT; OK with Dr. Dillon Fountain, renal function stable  IV fentanyl prn (pt with hx anaphylaxis to oxycodone)  Driveline dressing changes daily for now until 5/3/22; scant serosanguinous drainage noted today   Continue bowel regimen; decreased miralax to daily starting tomorrow (hold today); decreased senna-s to daily prn  Pulmonary toilet, monitor CT output  Place CT to water seal on Sunday if drainage stable, consider d/c on Monday    CPAP QHS  Advanced care plan forms on file   Strict I/O, daily weights, Na+ restricted diet   Continue VAD education   Equipment ordered        Critical Care Time: I personally performed 37 mins of critical care time on this patient, not to include any separately billable procedures or services. Conditions requiring critical care management:   - persistent right ventricular failure requiring inotropic support        SUBJECTIVE  BRIEF HISTORY OF PRESENT ILLNESS:  Jarrod Tamez is a 69 y.o. male with h/o HTN, HL, DM2, SRUTHI on CPAP, chronic systolic heart failure, stage D, NYHA class IV symptoms, non-ischemic cardiomyopathy, LVEF 20% with LVEDD 6.2, RV dysfunciton, TAPSE 1.22, TBili 1.5 likely from cardiac congestion, recent cardiac arrest s/p ICD (1/2013, [address] followed by Jodie Emmanuel), coronary artery disease s/p multiple interventions s/p 4V CABG (8/2012), LHC (7/2019) severe stenosis of LIMA to LAD anastomosis site, SVG graft to OM is occluded, SVG graft to RCA 40-50%, LAD occluded proximally, severe proximal LCx, RCA is the long . PET (6/2019) EF 24% with anterior lateral and inferior reversible defect. Anemia, microcytic with iron deficiency, DVT with filter.     Patient was referred to [facility] by Dr. Chi Mcpherson in 2021 for evaluation of his candidacy for advanced therapies.     Patient agreed to inpatient initiation of palliative infusion of chronic inotropes and evaluation for LVAD-DT. Patient was admitted 5/2-5/25/21. Patient was started on milrinone infusion and had first part of LVAD evaluation completed. Right and left heart cath on 5/24/21 showed severe diffuse native vessel CAD, with patent grafts (LIMA to D2, SVG to RCA).  Pt was found to have pulmonary nodule during workup.   Antiplatelet therapy was held during hospitalization and after discharge due to anticipated pulmonary nodule biopsy, bone marrow biopsy and EGD/C-Scope as part of LVAD workup. Pt found to have adenocarcinoma of the lung, and so LVAD was deferred pending treatment. Patient completed radiation treatment for adenocarcinoma of the lung. Hem-onc cleared patient for VAD implant with 90% 2 year prognosis.      He was most recently admitted for elective pre-op EGD and colonoscopy which he tolerated well; path negative for malignancy.      He presented to [facility] on 4/3 for admission for planned LVAD implant. INTERVAL HISTORY:  Underwent LVAD implant on 4/5/22. Was re-do sternotomy, bleeding intra-op, required cryo, k-centra, FFP, Plt, and cellsaver in OR. Had RV dysfunction noted intra-op; requiring lower VAD speed and dual inotrope support. Inotropic support was able to be decreased, but pt now remains on dobutamine at 6mcg/kg/min. Pt ans wife have been doing well with LVAD education. Pt has been OOB, is eating well and moving his bowels. Therapeutic on warfarin. LAST 24 HOURS:  No acute events overnight  Net -750mL  VSS and LVAD flows stable- no acute alarms on VAD  CT output 240mL last 24 hrs  Loose stools this morning  Pt reports feeling well today      REVIEW OF SYSTEMS:  Review of Systems   Constitutional: Negative for chills, fever, malaise/fatigue and weight loss. Respiratory: Negative for cough and shortness of breath. Cardiovascular: Negative for chest pain, palpitations, orthopnea and leg swelling. Gastrointestinal: Positive for diarrhea. Negative for abdominal pain, nausea and vomiting. Neurological: Negative for dizziness and weakness. LIFE GOALS:  Patient's personal goals include: getting stronger and going home soon  Important upcoming milestones or family events:    The patient identifies the following as important for living well: being independent, being at home, enjoying family time        OBJECTIVE:  PHYSICAL EXAM:  Visit Vitals  BP (!) 108/95   Pulse 75   Temp 99 °F (37.2 °C)   Resp 21   Ht 6' 1\" (1.854 m)   Wt 196 lb 4.8 oz (89 kg)   SpO2 (!) 81%   BMI 25.90 kg/m²       LVAD INTERROGATION:  Device interrogated in person  Device function normal, normal flow, no events  LVAD   Pump Speed (RPM): 4800  Pump Flow (LPM): 3.4  MAP: 78 (L radial doppler)  PI (Pulsitility Index): 6.1  Power: 3.1   Test: No  Back Up  at Bedside & Labeled: Yes  Power Module Test: No  Driveline Site Care: No  Driveline Dressing: Clean, Dry, and Intact  MAP in Therapeutic Range (Outpatient): Yes  Testing  Alarms Reviewed: Yes  Back up SC speed: 4800  Back up Low Speed Limit: 4400  Emergency Equipment with Patient?: Yes  Emergency procedures reviewed?: Yes  Drive line site inspected?: No  Drive line intergrity inspected?: Yes  Drive line dressing changed?: No      Physical Exam  Vitals reviewed. Constitutional:       General: He is not in acute distress. Appearance: Normal appearance. Cardiovascular:      Rate and Rhythm: Normal rate and regular rhythm. Comments: LVAD Hum noted on auscultation  Pulmonary:      Effort: Pulmonary effort is normal. No respiratory distress. Abdominal:      General: Bowel sounds are normal. There is no distension. Palpations: Abdomen is soft. Tenderness: There is no abdominal tenderness. Musculoskeletal:      Right lower le+ Pitting Edema present. Left lower le+ Pitting Edema present. Skin:     General: Skin is warm and dry. Capillary Refill: Capillary refill takes less than 2 seconds. Neurological:      General: No focal deficit present. Mental Status: He is alert and oriented to person, place, and time. Psychiatric:         Mood and Affect: Mood normal.         Behavior: Behavior normal.         Thought Content:  Thought content normal.         Judgment: Judgment normal.              CARDIAC IMAGING AND DIAGNOSTICS REVIEWED:  Echo (22)    Left Ventricle: Left ventricle size is normal. Moderately increased wall thickness. Findings consistent with concentric remodeling. Severe global hypokinesis present. Severely reduced left ventricular systolic function with a visually estimated EF of 10 -15%. 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   Aortic Valve: Moderate sclerosis of the aortic valve cusp.   Mitral Valve: Moderately thickened leaflet. Mild transvalvular regurgitation.   Tricuspid Valve: Severe transvalvular regurgitation.   Right Atrium: Right atrium is severely dilated.     Echo (3/2/22)   · LV 10-15%  · Mod-severe MR      Echo (11/23/21):  · LV 15-20%.    · Mod-severe, MR, mod-TR     Echo (5/20/21)  · LV: Estimated LVEF is 20 - 25%. Normal wall thickness. Mildly dilated left ventricle. Severely and globally reduced systolic function. Severe (grade 4) left ventricular diastolic dysfunction. · LA: Severely dilated left atrium. · RA: Severely dilated right atrium. · MV: Moderate mitral valve regurgitation is present. · TV: Moderate tricuspid valve regurgitation is present. · AO: Mild aortic root dilatation. · RV: Pacer/ICD present. · LVED 6.2cm     EKG (5/20/21) SB with 1degree AV block, QRS 116ms     LHC (5/24/21) Native Coronaries: LM - moderate to severe distal disease 50%. % prox occluded (), heavily calcified, LCx: 80% proximal stenosis. OM1 is  (seen filling faintly via collaterals). OM2 99% stenosis. RCA: 100% proximal occluded.  LIMA to LAD: patent, supplies only a diagonal branch (probably D2).  The LAD distal to the bifurcation is 100% occluded () and fills via right to left collaterals off the SVG to RCA. SVG to R-PDA: patent with moderate diffuse irregularities but no obstructive disease in the graft.    No appealing interventional targets.      NST as above     ICD Interrogation (11/12/2021) [employer] VVI, thoracic impedence trending up, no events, normal device function and good battery  ICD interrogation (5/12/21) FreeATM single lead, no events, normal device function and good battery     HEMODYNAMICS:  RHC 5/24/21 CI 1.97, PA 33/9/17, RA 1, PCWP 6- off milrinone   CPEST not done     Patient underwent a 6 minute walk test 11/12/2021     6 Min Walk Report 11/12/2021 7/6/2021 5/20/2021   (PRE) HR 88 98 74   (PRE) O2 Sat 100 - 99   (POST)  - 81   (POST) O2 Sat 99 98% on Ra 99   Distance in Meters 386.58 - 1158. 24       OTHER IMAGING:  CXR (6/17/21) clear  CT 5/21/21 1. Irregular pulmonary nodule in the left upper lobe measuring 2 cm, suspicious for primary pulmonary malignancy. 2. Additional 6 mm left upper lobe pulmonary nodule. 3. Severe calcific atherosclerosis of the coronary arteries and abdominal aorta. No aneurysm. 4. Cardiomegaly. 5. No acute abnormality within the chest, abdomen, or pelvis. LABORATORY RESULTS:     Labs Latest Ref Rng & Units 4/16/2022 4/15/2022 4/14/2022 4/14/2022 4/13/2022 4/12/2022 4/11/2022   WBC 4.1 - 11.1 K/uL 8.8 9.2 - 7.7 8.4 9.1 8.2   RBC 4.10 - 5.70 M/uL 2.94(L) 3.17(L) - 2.93(L) 3.33(L) 3.23(L) 3.39(L)   Hemoglobin 12.1 - 17.0 g/dL 7. 7(L) 8.2(L) 8.2(L) 7. 7(L) 8.7(L) 8.5(L) 8.8(L)   Hematocrit 36.6 - 50.3 % 23. 6(L) 25. 5(L) 25. 5(L) 23. 7(L) 26. 9(L) 26. 1(L) 27. 2(L)   MCV 80.0 - 99.0 FL 80.3 80.4 - 80.9 80.8 80.8 80.2   Platelets 336 - 828 K/uL 203 208 - 177 178 161 144(L)   Lymphocytes 12 - 49 % - - - - - - -   Monocytes 5 - 13 % - - - - - - -   Eosinophils 0 - 7 % - - - - - - -   Basophils 0 - 1 % - - - - - - -   Bands 0 - 6 % - - - - - - -   Albumin 3.5 - 5.0 g/dL 2. 7(L) 2. 9(L) - 2. 8(L) 2. 9(L) 2. 8(L) 2. 6(L)   Calcium 8.5 - 10.1 MG/DL 8.4(L) 8. 3(L) - 8. 4(L) 8.7 8.5 8.2(L)   Glucose 65 - 100 mg/dL 118(H) 141(H) - 115(H) 135(H) 157(H) 136(H)   BUN 6 - 20 MG/DL 28(H) 26(H) - 23(H) 22(H) 18 22(H)   Creatinine 0.70 - 1.30 MG/DL 0.85 0.87 - 0.83 0.96 0.82 0.79   Sodium 136 - 145 mmol/L 128(L) 129(L) - 130(L) 131(L) 132(L) 133(L)   Potassium 3.5 - 5.1 mmol/L 4.8 4.6 - 4.0 4.4 4.3 4.1   TSH 0.36 - 3.74 uIU/mL - - - - - - -   LDH 85 - 241 U/L 201 192 - 185 248(H) - 276(H)   Some recent data might be hidden     Lab Results   Component Value Date/Time    TSH 2.26 04/10/2022 03:00 AM    TSH 1.68 03/01/2022 11:50 AM    TSH 1.47 05/26/2021 10:44 PM    TSH 1.97 05/20/2021 04:28 PM    TSH 1.41 02/09/2021 03:05 PM    TSH 1.740 08/14/2018 09:58 AM    TSH 1.710 10/18/2017 12:00 AM         HISTORY:  PAST MEDICAL HISTORY:  Past Medical History:   Diagnosis Date    CAD (coronary artery disease) '90 '97, '13 x 2    [place] [facility]    Calculus of kidney     Colonic polyps     Congestive heart failure, unspecified     Diabetes ([facility] [address]     Gastritis     Hypercholesterolemia     Sleep apnea        PAST SURGICAL HISTORY:  Past Surgical History:   Procedure Laterality Date    COLONOSCOPY  04/06/2011    16, due 21    COLONOSCOPY N/A 3/2/2022    COLONOSCOPY    :- performed by Kusum Marie [facility] at [facility] ENDOSCOPY    ENDOSCOPY, COLON, DIAGNOSTIC      11, due 16    HX CORONARY ARTERY BYPASS GRAFT  8/22/12    x 4 vessel by MISSY Ramirez    HX HEENT      LASIK    HX PACEMAKER PLACEMENT  1/30/13    SD CARDIAC SURG PROCEDURE UNLIST  2012    x 4 vessels       FAMILY HISTORY:  Family History   Problem Relation Age of Onset    Heart Disease Mother         MI    Heart Disease Father         CAD & PVD    Lung Disease Father     Cancer Father         lung    Diabetes Maternal Grandmother     Heart Disease Other         CAD    Stroke Sister        SOCIAL HISTORY:  Social History     Socioeconomic History    Marital status:    Tobacco Use    Smoking status: Never Smoker    Smokeless tobacco: Never Used   Vaping Use    Vaping Use: Never used   Substance and Sexual Activity    Alcohol use:  Yes     Alcohol/week: 0.0 standard drinks     Comment:  VERY RARE    Drug use: No       ALLERGY:  Allergies   Allergen Reactions    Oxycodone Anaphylaxis    Pcn [Penicillins] Hives        CURRENT MEDICATIONS:    Current Facility-Administered Medications:     potassium chloride SR (KLOR-CON 10) tablet 20 mEq, 20 mEq, Oral, DAILY, James Sosa MD, 20 mEq at 04/15/22 0844    hydrALAZINE (APRESOLINE) tablet 50 mg, 50 mg, Oral, Q8H, Polliard, Julia Moulds T, NP, 50 mg at 04/16/22 0437    losartan (COZAAR) tablet 12.5 mg, 12.5 mg, Oral, DAILY, Polliard, Julia Moulds T, NP, 12.5 mg at 04/16/22 0851    cefepime (MAXIPIME) 2 g in 0.9% sodium chloride 10 mL IV syringe, 2 g, IntraVENous, Q12H, Polliard, Julia Moulds T, NP, 2 g at 04/16/22 0300    amiodarone (CORDARONE) tablet 200 mg, 200 mg, Oral, BID, [place]liar[place], Julia Moulds T, NP, 200 mg at 04/16/22 0850    colchicine tablet 0.6 mg, 0.6 mg, Oral, DAILY, Polliarang[place], Julia Moulds T, NP, 0.6 mg at 04/16/22 0850    alteplase (CATHFLO) 1 mg in sterile water (preservative free) 1 mL injection, 1 mg, InterCATHeter, PRN, Princess Damaris Good MD    bisacodyL (DULCOLAX) suppository 10 mg, 10 mg, Rectal, DAILY PRN, James Sosa MD, 10 mg at 04/12/22 1533    gabapentin (NEURONTIN) capsule 200 mg, 200 mg, Oral, TID, Beto Wnogli Moulds T, NP, 200 mg at 04/16/22 0851    ketorolac (TORADOL) injection 15 mg, 15 mg, IntraVENous, Q6H PRN, Millie Wong T, NP, 15 mg at 04/16/22 0437    albumin human 25% (BUMINATE) solution 12.5 g, 12.5 g, IntraVENous, BID, Polliard, Julia Moulds T, NP, 12.5 g at 04/16/22 0841    polyethylene glycol (MIRALAX) packet 17 g, 17 g, Oral, BID, James Sosa MD, 17 g at 04/16/22 0849    senna-docusate (PERICOLACE) 8.6-50 mg per tablet 1 Tablet, 1 Tablet, Oral, BID, James Sosa MD, 1 Tablet at 04/16/22 0851    famotidine (PEPCID) tablet 20 mg, 20 mg, Oral, Q12H, James Sosa MD, 20 mg at 04/16/22 0850    bumetanide (BUMEX) injection 1 mg, 1 mg, IntraVENous, Q6H PRN, James Sosa MD, 1 mg at 04/10/22 1234    mirtazapine (REMERON) tablet 7.5 mg, 7.5 mg, Oral, QHS, Danica Stewart NP, 7.5 mg at 04/15/22 2206    lidocaine 4 % patch 1 Patch, 1 Patch, TransDERmal, Q24H, Danica Stewart, NP, 1 Patch at 04/15/22 1307    Warfarin NP/MD dosing, , Other, PRN, Terry, Danica B, NP    melatonin tablet 3 mg, 3 mg, Oral, QHS PRN, Naomi Renee MD, 3 mg at 04/10/22 0251    hydrALAZINE (APRESOLINE) 20 mg/mL injection 10 mg, 10 mg, IntraVENous, Q6H PRN, Wandalee Reil B, NP, 10 mg at 04/10/22 0245    aspirin chewable tablet 81 mg, 81 mg, Oral, DAILY, Wandalee Reil B, NP, 81 mg at 04/16/22 0849    hydrALAZINE (APRESOLINE) 20 mg/mL injection 5 mg, 5 mg, IntraVENous, Q6H PRN, Raphaeldalee Reil B, NP, 5 mg at 04/13/22 6454    sodium chloride (NS) flush 5-40 mL, 5-40 mL, IntraVENous, Q8H, John Wong, NP, 10 mL at 04/16/22 0600    sodium chloride (NS) flush 5-40 mL, 5-40 mL, IntraVENous, PRN, Caro Wong NP    0.9% sodium chloride infusion, 9 mL/hr, IntraVENous, CONTINUOUS, John Wong, NP, Last Rate: 6 mL/hr at 04/15/22 1610, 6 mL/hr at 04/15/22 1610    acetaminophen (TYLENOL) tablet 650 mg, 650 mg, Oral, Q6H PRN, Caro Wong NP, 650 mg at 04/16/22 0834    naloxone (NARCAN) injection 0.4 mg, 0.4 mg, IntraVENous, PRN, Caro Wong NP    ondansetron [facility]) injection 4 mg, 4 mg, IntraVENous, Q4H PRN, John Wong, NP, 4 mg at 04/11/22 2139    albuterol-ipratropium (DUO-NEB) 2.5 MG-0.5 MG/3 ML, 3 mL, Nebulization, Q6H PRN, Caro Wong NP    midazolam (VERSED) injection 1 mg, 1 mg, IntraVENous, Q1H PRN, Caro Wong NP    ELECTROLYTE REPLACEMENT NOTE: Nurse to review Serum Potassium and Magnesuim levels and Initiate Electrolyte Replacement Protocol as needed, 1 Each, Other, PRN, Caro Wong NP    insulin regular (NOVOLIN R, HUMULIN R) 100 Units in 0.9% sodium chloride 100 mL infusion, 0-50 Units/hr, IntraVENous, TITRATE, Caro Wong NP, Stopped at 04/09/22 1619    glucose chewable tablet 16 g, 4 Tablet, Oral, PRN, Caro Wong NP    glucagon (GLUCAGEN) injection 1 mg, 1 mg, IntraMUSCular, PRN, Ajit Wong NP    insulin lispro (HUMALOG) injection, , SubCUTAneous, AC&HS, Marvin, Lita MONTERO, NP, 2 Units at 04/14/22 1622    dextrose 10 % infusion 0-250 mL, 0-250 mL, IntraVENous, PRN, Ajit Wong NP, Last Rate: 999 mL/hr at 04/07/22 2145, 45 mL at 04/07/22 2145    sucralfate (CARAFATE) tablet 1 g, 1 g, Oral, AC&HS, Ajit Wong, NP, 1 g at 04/16/22 9959    0.45% sodium chloride infusion, 10 mL/hr, IntraVENous, CONTINUOUS, Johnathon Najera MD, Stopped at 04/10/22 0730    fentaNYL citrate (PF) injection 25 mcg, 25 mcg, IntraVENous, Q2H PRN, Ajit Wong NP, 25 mcg at 04/16/22 0834    DOBUTamine (DOBUTREX) 1,000 mg/250 mL (4,000 mcg/mL) infusion, 0-10 mcg/kg/min, IntraVENous, TITRATE, Ajit Wong NP, Last Rate: 6.8 mL/hr at 04/16/22 0758, 6 mcg/kg/min at 04/16/22 0758    PATIENT CARE TEAM:  Patient Care Team:  Birdie Shaikh MD as PCP - General (Internal Medicine)  Birdie Shaikh MD as PCP - [facility]  Edwin Rees MD as Physician (Cardiology)  Sanjuana Mitchell MD as Physician (Gastroenterology)  Melia Rodriguez MD as Physician (Orthopedic Surgery)  Baltazar Victor MD as Physician (Ophthalmology)  Estela Lynch MD (Cardiology)  Evgeny Viera MD (Cardiology)         Thank you for allowing us to participate in this patient's care.       Cyndi Cunningham NP   [address] [place] [address], [address]  Phone: [phone]  Fax: [phone]

## 2022-04-16 NOTE — PROGRESS NOTES
Problem: Mobility Impaired (Adult and Pediatric)  Goal: *Acute Goals and Plan of Care (Insert Text)  Description: FUNCTIONAL STATUS PRIOR TO ADMISSION: Patient was independent and active without use of DME.    HOME SUPPORT PRIOR TO ADMISSION: The patient lived with his wife but did not require assist.    Physical Therapy Goals  Weekly Reassessment 4/15/2022 (goals progressed)  1. Patient will move from supine to sit and sit to supine , scoot up and down, and roll side to side in bed with head of bed flat with supervision/set-up within 7 days. 2.  Patient will perform sit to/from stand from chair height with supervision/set-up within 7 days. 3.  Patient will ambulate 300 feet stand by assist within 7 days. 4.  Patient will ascend/descend 6 stairs with handrail(s) with minimal assistance/contact guard assist within 14 days. 5.  Patient will perform cardiac exercises per protocol with supervision/set-up within 7 days. 6.  Patient will verbally and functionally recall 3/3 sternal precautions within 7 days. 7.  Patient will perform power exchange for power module to/from battery with stand by assist within 7 days. Initiated 4/8/2022  1. Patient will move from supine to sit and sit to supine , scoot up and down, and roll side to side in bed with supervision/set-up within 7 days. 2.  Patient will perform sit to/from stand with supervision/set-up within 7 days. 3.  Patient will ambulate 50 feet with least restrictive assistive device and minimal assistance/contact guard assist within 7 days. 4.  Patient will ascend/descend 6 stairs with handrail(s) with minimal assistance/contact guard assist within 14 days. 5.  Patient will perform cardiac exercises per protocol with supervision/set-up within 7 days. 6.  Patient will verbally and functionally recall 3/3 sternal precautions within 7 days.   7.  Patient will perform power exchange for power module to/from battery with minimal assistance within 7 days.              Outcome: Progressing Towards Goal   PHYSICAL THERAPY TREATMENT  Patient: Rafaela Gamble (39 y.o. male)  Date: 4/16/2022  Diagnosis: HFrEF (heart failure with reduced ejection fraction) ([facility]) [I50.20] <principal problem not specified>  Procedure(s) (LRB):  REDO STERNOTOMY; RIGHT GROIN CUTDOWN FOR CANNULATION;  INSERTION OF LEFT VENTRICULAR ASSIST DEVICE (HMIII); AORTIC VALVE CLOSURE CHEL BY DR. Sagrario Schneider. (N/A) 11 Days Post-Op  Precautions: Fall (move in the tube, LVAD)  Chart, physical therapy assessment, plan of care and goals were reviewed. ASSESSMENT  Patient continues with skilled PT services and is progressing towards goals. Patient received in chair and reports he has been sitting all day. Pt required increased time to switch over to battery. Pt ambulated with fair milton demonstrating 2 episodes of LOB toward R side requiring min A. Pt reported legs feeling fatigue and attributed to sitting in chair for length of time. Pt switched to power while sitting EOB then returned to supine to rest. Continue to anticipate HHPT upon discharge to progress functional mobility. Current Level of Function Impacting Discharge (mobility/balance): CGA/min A ambulation    Other factors to consider for discharge: fall risk         PLAN :  Patient continues to benefit from skilled intervention to address the above impairments. Continue treatment per established plan of care. to address goals.     Recommendation for discharge: (in order for the patient to meet his/her long term goals)  Physical therapy at least 2 days/week in the home AND ensure assist and/or supervision for safety with mobility    This discharge recommendation:  Has been made in collaboration with the attending provider and/or case management    IF patient discharges home will need the following DME: none       SUBJECTIVE:   Patient stated I have been in the chair since 7 am.    OBJECTIVE DATA SUMMARY:   Cardiac diagnosis intervention:  The patient stated 3/3 sternal precautions when prompted. Reviewed the \"3 Ps\" with patient. The patient required minimal cues to maintain sternal precautions during functional activity. Critical Behavior:  Neurologic State: Alert  Orientation Level: Oriented X4  Cognition: Appropriate decision making     Functional Mobility Training:  Bed Mobility:        Sit to Supine: Contact guard assistance           Transfers:  Sit to Stand: Contact guard assistance  Stand to Sit: Contact guard assistance                             Balance:  Sitting: Intact  Standing: Impaired; Without support  Standing - Static: Good  Standing - Dynamic : Occasional;Fair  Ambulation/Gait Training:  Distance (ft): 75 Feet (ft) (X2)  Assistive Device: Gait belt  Ambulation - Level of Assistance: Contact guard assistance;Minimal assistance        Gait Abnormalities: Decreased step clearance;Trunk sway increased; Path deviations        Base of Support: Center of gravity altered     Speed/Charlene: Pace decreased (<100 feet/min)    VAD power transition  Patient performed switchover from power module to battery. Completed the following tasks:   Independent Verbal cues Physical assist Comments   Don holster vest   x    Check batteries x      Check  x      Ensure  clipped onto stabilization belt  x     Position batteries in clips   x    Disconnect power leads  x     Insert power lead into battery  x     [place] batteries in holster   x     Secure batteries with velcro and clips  x       Patient performed switchover from battery to power module.   Completed the following tasks:   Independent Verbal cues Physical assistance Comments   Remove batteries from holster x      Disconnect power leads from battery  x     Reconnect power leads into power module  x     Remove batteries from clips  x     Doff holster vest   x      Therapeutic Exercises:   Deferred 2/2 fatigue      Activity Tolerance: Fair      After treatment patient left in no apparent distress:   Supine in bed, Call bell within reach, Caregiver / family present, and Side rails x 3    COMMUNICATION/COLLABORATION:   The patients plan of care was discussed with: Registered nurse and Rehabilitation technician.      Megan Seay, PT   Time Calculation: 30 mins

## 2022-04-16 NOTE — PROGRESS NOTES
0800- Bedside report received. Assessment performed. 1100- Breakdown noted on sacrum. Mepilex applied and wound consult placed. Carson 172 NP at bedside. Discussed overnight events. Verbal order to perform doppler cuffs Q4H as well as LVAD numbers and not do computer cuff as results have been mixed. Patient had large soft stool. Discussed with NP. NP also confirmed holding of Potassium this am.    1150- Cathflo placed in grey port due to inability to aspirate blood off site. 1230- Able to aspirate cathflo and blood off grey port. [id number]- Patient working with PT/OT. Wife at bedside. RN worked with wife to place patient on batteries then back to wall power. Wife very anxious and stated patient would be the one doing all this. RN reminded wife she needed to be comfortable as well. Emotional support provided. Discussed charging batteries, rotating batteries, need to assess gold connectors and bring back up supplies when leaving the house. Wife stated she was unable to perform dressing change today but will try tomorrow. RN encouraged continuing to read dressing change steps and performing sterile glove application. 1730- Arterial line removed. 1910- LVAD dressing changed. Large amount of purulent/ old bloody output noted. Culture obtained. Notified Denis Hayden NP.     2000- Bedside and Verbal shift change report given to AMPARO RN (oncoming nurse) by Melony Rahman RN (offgoing nurse). Report given with SBAR, Kardex, Intake/Output and MAR.

## 2022-04-17 ENCOUNTER — APPOINTMENT (OUTPATIENT)
Dept: GENERAL RADIOLOGY | Age: 77
DRG: 001 | End: 2022-04-17
Attending: NURSE PRACTITIONER
Payer: MEDICARE

## 2022-04-17 LAB
ALBUMIN SERPL-MCNC: 2.6 G/DL (ref 3.5–5)
ALBUMIN/GLOB SERPL: 0.8 {RATIO} (ref 1.1–2.2)
ALP SERPL-CCNC: 116 U/L (ref 45–117)
ALT SERPL-CCNC: 24 U/L (ref 12–78)
ANION GAP SERPL CALC-SCNC: 6 MMOL/L (ref 5–15)
APTT PPP: 40 SEC (ref 22.1–31)
AST SERPL-CCNC: 50 U/L (ref 15–37)
BILIRUB SERPL-MCNC: 1.1 MG/DL (ref 0.2–1)
BNP SERPL-MCNC: 3067 PG/ML
BUN SERPL-MCNC: 23 MG/DL (ref 6–20)
BUN/CREAT SERPL: 27 (ref 12–20)
CALCIUM SERPL-MCNC: 8.4 MG/DL (ref 8.5–10.1)
CHLORIDE SERPL-SCNC: 98 MMOL/L (ref 97–108)
CO2 SERPL-SCNC: 24 MMOL/L (ref 21–32)
CREAT SERPL-MCNC: 0.84 MG/DL (ref 0.7–1.3)
ERYTHROCYTE [DISTWIDTH] IN BLOOD BY AUTOMATED COUNT: 22.5 % (ref 11.5–14.5)
GLOBULIN SER CALC-MCNC: 3.1 G/DL (ref 2–4)
GLUCOSE BLD STRIP.AUTO-MCNC: 121 MG/DL (ref 65–117)
GLUCOSE BLD STRIP.AUTO-MCNC: 147 MG/DL (ref 65–117)
GLUCOSE BLD STRIP.AUTO-MCNC: 195 MG/DL (ref 65–117)
GLUCOSE BLD STRIP.AUTO-MCNC: 236 MG/DL (ref 65–117)
GLUCOSE SERPL-MCNC: 174 MG/DL (ref 65–100)
HCT VFR BLD AUTO: 23.9 % (ref 36.6–50.3)
HGB BLD-MCNC: 7.7 G/DL (ref 12.1–17)
INR PPP: 1.9 (ref 0.9–1.1)
LACTATE SERPL-SCNC: 0.7 MMOL/L (ref 0.4–2)
LDH SERPL L TO P-CCNC: 196 U/L (ref 85–241)
MAGNESIUM SERPL-MCNC: 2.1 MG/DL (ref 1.6–2.4)
MCH RBC QN AUTO: 25.9 PG (ref 26–34)
MCHC RBC AUTO-ENTMCNC: 32.2 G/DL (ref 30–36.5)
MCV RBC AUTO: 80.5 FL (ref 80–99)
NRBC # BLD: 0 K/UL (ref 0–0.01)
NRBC BLD-RTO: 0 PER 100 WBC
PLATELET # BLD AUTO: 205 K/UL (ref 150–400)
PMV BLD AUTO: 9.5 FL (ref 8.9–12.9)
POTASSIUM SERPL-SCNC: 4.8 MMOL/L (ref 3.5–5.1)
PROT SERPL-MCNC: 5.7 G/DL (ref 6.4–8.2)
PROTHROMBIN TIME: 19.2 SEC (ref 9–11.1)
RBC # BLD AUTO: 2.97 M/UL (ref 4.1–5.7)
SERVICE CMNT-IMP: ABNORMAL
SODIUM SERPL-SCNC: 128 MMOL/L (ref 136–145)
THERAPEUTIC RANGE,PTTT: ABNORMAL SECS (ref 58–77)
WBC # BLD AUTO: 9.3 K/UL (ref 4.1–11.1)

## 2022-04-17 PROCEDURE — 74011250636 HC RX REV CODE- 250/636: Performed by: NURSE PRACTITIONER

## 2022-04-17 PROCEDURE — 74011250637 HC RX REV CODE- 250/637: Performed by: NURSE PRACTITIONER

## 2022-04-17 PROCEDURE — 71045 X-RAY EXAM CHEST 1 VIEW: CPT

## 2022-04-17 PROCEDURE — 65610000003 HC RM ICU SURGICAL

## 2022-04-17 PROCEDURE — 85610 PROTHROMBIN TIME: CPT

## 2022-04-17 PROCEDURE — 99291 CRITICAL CARE FIRST HOUR: CPT | Performed by: NURSE PRACTITIONER

## 2022-04-17 PROCEDURE — 74011000250 HC RX REV CODE- 250: Performed by: NURSE PRACTITIONER

## 2022-04-17 PROCEDURE — 83880 ASSAY OF NATRIURETIC PEPTIDE: CPT

## 2022-04-17 PROCEDURE — 83615 LACTATE (LD) (LDH) ENZYME: CPT

## 2022-04-17 PROCEDURE — 36415 COLL VENOUS BLD VENIPUNCTURE: CPT

## 2022-04-17 PROCEDURE — P9047 ALBUMIN (HUMAN), 25%, 50ML: HCPCS | Performed by: NURSE PRACTITIONER

## 2022-04-17 PROCEDURE — 74011636637 HC RX REV CODE- 636/637: Performed by: NURSE PRACTITIONER

## 2022-04-17 PROCEDURE — 80053 COMPREHEN METABOLIC PANEL: CPT

## 2022-04-17 PROCEDURE — 85027 COMPLETE CBC AUTOMATED: CPT

## 2022-04-17 PROCEDURE — 83735 ASSAY OF MAGNESIUM: CPT

## 2022-04-17 PROCEDURE — 85730 THROMBOPLASTIN TIME PARTIAL: CPT

## 2022-04-17 PROCEDURE — 82962 GLUCOSE BLOOD TEST: CPT

## 2022-04-17 PROCEDURE — 83605 ASSAY OF LACTIC ACID: CPT

## 2022-04-17 PROCEDURE — 74011250637 HC RX REV CODE- 250/637: Performed by: INTERNAL MEDICINE

## 2022-04-17 RX ADMIN — SODIUM CHLORIDE 2 G: 9 INJECTION INTRAMUSCULAR; INTRAVENOUS; SUBCUTANEOUS at 02:29

## 2022-04-17 RX ADMIN — SUCRALFATE 1 G: 1 TABLET ORAL at 09:00

## 2022-04-17 RX ADMIN — HYDRALAZINE HYDROCHLORIDE 50 MG: 50 TABLET, FILM COATED ORAL at 13:06

## 2022-04-17 RX ADMIN — FAMOTIDINE 20 MG: 20 TABLET ORAL at 21:16

## 2022-04-17 RX ADMIN — KETOROLAC TROMETHAMINE 15 MG: 30 INJECTION, SOLUTION INTRAMUSCULAR; INTRAVENOUS at 07:31

## 2022-04-17 RX ADMIN — MIRTAZAPINE 7.5 MG: 15 TABLET, FILM COATED ORAL at 21:16

## 2022-04-17 RX ADMIN — WARFARIN SODIUM 6 MG: 5 TABLET ORAL at 16:08

## 2022-04-17 RX ADMIN — SODIUM CHLORIDE 3 ML/HR: 9 INJECTION, SOLUTION INTRAVENOUS at 02:35

## 2022-04-17 RX ADMIN — ALBUMIN (HUMAN) 12.5 G: 0.25 INJECTION, SOLUTION INTRAVENOUS at 08:55

## 2022-04-17 RX ADMIN — AMIODARONE HYDROCHLORIDE 200 MG: 200 TABLET ORAL at 17:23

## 2022-04-17 RX ADMIN — ASPIRIN 81 MG CHEWABLE TABLET 81 MG: 81 TABLET CHEWABLE at 08:55

## 2022-04-17 RX ADMIN — POTASSIUM CHLORIDE 10 MEQ: 750 TABLET, EXTENDED RELEASE ORAL at 08:59

## 2022-04-17 RX ADMIN — SODIUM CHLORIDE 2 G: 9 INJECTION INTRAMUSCULAR; INTRAVENOUS; SUBCUTANEOUS at 13:40

## 2022-04-17 RX ADMIN — Medication 2 UNITS: at 12:24

## 2022-04-17 RX ADMIN — DOBUTAMINE HYDROCHLORIDE 6 MCG/KG/MIN: 400 INJECTION INTRAVENOUS at 17:31

## 2022-04-17 RX ADMIN — SODIUM CHLORIDE, PRESERVATIVE FREE 10 ML: 5 INJECTION INTRAVENOUS at 08:38

## 2022-04-17 RX ADMIN — COLCHICINE 0.6 MG: 0.6 TABLET, FILM COATED ORAL at 08:55

## 2022-04-17 RX ADMIN — AMIODARONE HYDROCHLORIDE 200 MG: 200 TABLET ORAL at 08:55

## 2022-04-17 RX ADMIN — POLYETHYLENE GLYCOL 3350 17 G: 17 POWDER, FOR SOLUTION ORAL at 08:59

## 2022-04-17 RX ADMIN — KETOROLAC TROMETHAMINE 15 MG: 30 INJECTION, SOLUTION INTRAMUSCULAR; INTRAVENOUS at 13:06

## 2022-04-17 RX ADMIN — SUCRALFATE 1 G: 1 TABLET ORAL at 11:41

## 2022-04-17 RX ADMIN — GABAPENTIN 200 MG: 100 CAPSULE ORAL at 08:59

## 2022-04-17 RX ADMIN — HYDRALAZINE HYDROCHLORIDE 50 MG: 50 TABLET, FILM COATED ORAL at 21:16

## 2022-04-17 RX ADMIN — GABAPENTIN 200 MG: 100 CAPSULE ORAL at 21:16

## 2022-04-17 RX ADMIN — FAMOTIDINE 20 MG: 20 TABLET ORAL at 08:59

## 2022-04-17 RX ADMIN — SODIUM CHLORIDE, PRESERVATIVE FREE 10 ML: 5 INJECTION INTRAVENOUS at 13:41

## 2022-04-17 RX ADMIN — SUCRALFATE 1 G: 1 TABLET ORAL at 16:07

## 2022-04-17 RX ADMIN — HYDRALAZINE HYDROCHLORIDE 50 MG: 50 TABLET, FILM COATED ORAL at 03:02

## 2022-04-17 RX ADMIN — SUCRALFATE 1 G: 1 TABLET ORAL at 21:16

## 2022-04-17 RX ADMIN — GABAPENTIN 200 MG: 100 CAPSULE ORAL at 16:07

## 2022-04-17 RX ADMIN — ALBUMIN (HUMAN) 12.5 G: 0.25 INJECTION, SOLUTION INTRAVENOUS at 17:23

## 2022-04-17 RX ADMIN — LOSARTAN POTASSIUM 12.5 MG: 25 TABLET, FILM COATED ORAL at 08:59

## 2022-04-17 RX ADMIN — SODIUM CHLORIDE, PRESERVATIVE FREE 10 ML: 5 INJECTION INTRAVENOUS at 21:17

## 2022-04-17 NOTE — PROGRESS NOTES
Bedside and Verbal shift change report given to PS RN (oncoming nurse) by Radhames Garcia RN (offgoing nurse).   Report given with SBAR, Kardex, Intake/Output and MAR.

## 2022-04-17 NOTE — PROGRESS NOTES
0730: Bedside and Verbal shift change report given to Cy Moon, RN (oncoming nurse) by Go Martin, KIRK (offgoing nurse). Report included the following information SBAR, Intake/Output, MAR, Recent Results and Cardiac Rhythm Afib.     0845Dostarr Wilson NP at bedside; updated by RN; orders received to switch to water seal suction for chest tubes     1515: Pt's wife and son at bedside to visit     [phone]: LVAD dressing changed; scant amount of old brown bloody output around driveline entry site noted; HF already aware     1930: Bedside and Verbal shift change report given to Barbara Alcazar RN (oncoming nurse) by Cy Moon RN (offgoing nurse). Report included the following information SBAR, Intake/Output, MAR, Recent Results and Cardiac Rhythm Afib.

## 2022-04-17 NOTE — PROGRESS NOTES
Problem: Falls - Risk of  Goal: *Absence of Falls  Description: Document Mayito Ku Fall Risk and appropriate interventions in the flowsheet.   4/17/2022 0340 by Radha Hanna RN  Outcome: Progressing Towards Goal  Note: Fall Risk Interventions:  Mobility Interventions: Patient to call before getting OOB         Medication Interventions: Evaluate medications/consider consulting pharmacy,Patient to call before getting OOB,Teach patient to arise slowly    Elimination Interventions: Call light in reach,Urinal in reach,Patient to call for help with toileting needs,Toileting schedule/hourly rounds           4/17/2022 0339 by Radha Hanna RN  Outcome: Progressing Towards Goal  Note: Fall Risk Interventions:  Mobility Interventions: Patient to call before getting OOB         Medication Interventions: Evaluate medications/consider consulting pharmacy,Patient to call before getting OOB,Teach patient to arise slowly    Elimination Interventions: Call light in reach,Urinal in reach,Patient to call for help with toileting needs,Toileting schedule/hourly rounds              Problem: Heart Failure: Discharge Outcomes  Goal: *Left ventricular function assessment completed prior to or during stay, or planned for post-discharge  Outcome: Progressing Towards Goal  Goal: *Verbalizes understanding/describes prescribed medications  Outcome: Progressing Towards Goal     Problem: LVAD Post-op Day 8 to day 14  Goal: Nutrition/Diet  Outcome: Progressing Towards Goal  Goal: Medications  Outcome: Progressing Towards Goal  Goal: Discharge Planning  Outcome: Progressing Towards Goal  Goal: Treatments/Interventions/Procedures  Outcome: Progressing Towards Goal  Goal: Psychosocial  Outcome: Progressing Towards Goal  Goal: *Hemodynamically stable  Outcome: Progressing Towards Goal  Goal: *Lungs clear or at baseline  Outcome: Progressing Towards Goal  Goal: *Adequate oxygenation  Outcome: Progressing Towards Goal  Goal: *Tolerating diet  Outcome: Progressing Towards Goal

## 2022-04-17 NOTE — PROGRESS NOTES
[facility] in [place], [place]  Inpatient Progress Note      Patient name: Debi Dugan  Patient : 1945  Patient MRN: [id number]  Consulting MD: Makeda Ybarra MD  Date of service: 22      REASON FOR REFERRAL:  Management of chronic systolic heart failure      ASSESSMENT:   · 69 y.o. 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 · Now POD 12 s/p LVAD implant with HM3 as DT due to age (22)   Post-op course complicated by RV failure requiring prolonged inotropic support with dobutamine, congestive hepatopathy, superficial soft tissue infection at sternotomy incision requiring resumption of antibiotics      PLAN:  POD#12 from LVAD implant  TTE shows severe TR but improved RV function; continue to optimize medically   Continue speed 4800rpms, low speed 4400  Continue dobutamine 6 mcg/kg/min, keep this dose and no plans to wean this weekend  Continue hydralazine 50 mg PO q8h   Continue decreased amiodarone to 200 mg PO twice daily due to nausea, vomiting   Continue losartan 12.5 mg PO daily   Diuretics PRN CVP > 14 mmHg (monitor via PICC)   Monitor MAP via doppler  Extended course of cefepime q12h for soft tissue infection at sternotomy site; examined by Dr. Titi Reyes; now continuing pending culture of driveline exit site  Driveline dressing changes daily for now until 5/3/22  Nursing report of moderate amt dark drainage st driveline exit site- expressed from pocket; appeared to be old blood, no odor, no tenderness or erythema.    Wound culture sent from driveline site drainage; pt still on abx for sternal wound  NANDINI hose    Continue warfarin, goal INR 2-3; 6mg tonight   ASA 81mg daily  Continue colchicine for pericarditis   Continue gabapentin 200 mg PO TID   Toradol q6h PRN and before PT/OT; OK with  Fiser, renal function stable  IV fentanyl prn (pt with hx anaphylaxis to oxycodone)  Continue bowel regimen; decreased miralax to daily starting tomorrow (hold today); decreased senna-s to daily prn  Pulmonary toilet, monitor CT output  Place CT to water seal on today; if drainage stable consider d/c on Monday    CPAP QHS  Advanced care plan forms on file   Strict I/O, daily weights, Na+ restricted diet   Continue VAD education   Equipment ordered        Critical Care Time: I personally performed 33mins of critical care time on this patient, not to include any separately billable procedures or services. Conditions requiring critical care management:   - persistent right ventricular failure requiring inotropic support        SUBJECTIVE  BRIEF HISTORY OF PRESENT ILLNESS:  Sonya Woody is a 69 y.o. male with h/o HTN, HL, DM2, SRUTHI on CPAP, chronic systolic heart failure, stage D, NYHA class IV symptoms, non-ischemic cardiomyopathy, LVEF 20% with LVEDD 6.2, RV dysfunciton, TAPSE 1.22, TBili 1.5 likely from cardiac congestion, recent cardiac arrest s/p ICD (1/2013, [employer] followed by [facility]), coronary artery disease s/p multiple interventions s/p 4V CABG (8/2012), LHC (7/2019) severe stenosis of LIMA to LAD anastomosis site, SVG graft to OM is occluded, SVG graft to RCA 40-50%, LAD occluded proximally, severe proximal LCx, RCA is the long . PET (6/2019) EF 24% with anterior lateral and inferior reversible defect. Anemia, microcytic with iron deficiency, DVT with filter.     Patient was referred to [facility] by Dr. James Chaudhry in 2021 for evaluation of his candidacy for advanced therapies.     Patient agreed to inpatient initiation of palliative infusion of chronic inotropes and evaluation for LVAD-DT. Patient was admitted 5/2-5/25/21. Patient was started on milrinone infusion and had first part of LVAD evaluation completed.  Right and left heart cath on 5/24/21 showed severe diffuse native vessel CAD, with patent grafts (LIMA to D2, SVG to RCA).  Pt was found to have pulmonary nodule during workup. Antiplatelet therapy was held during hospitalization and after discharge due to anticipated pulmonary nodule biopsy, bone marrow biopsy and EGD/C-Scope as part of LVAD workup. Pt found to have adenocarcinoma of the lung, and so LVAD was deferred pending treatment. Patient completed radiation treatment for adenocarcinoma of the lung. Hem-onc cleared patient for VAD implant with 90% 2 year prognosis.      He was most recently admitted for elective pre-op EGD and colonoscopy which he tolerated well; path negative for malignancy.      He presented to [address] [place] [address] on 4/3 for admission for planned LVAD implant. INTERVAL HISTORY:  Underwent LVAD implant on 4/5/22. Was re-do sternotomy, bleeding intra-op, required cryo, k-centra, FFP, Plt, and cellsaver in OR. Had RV dysfunction noted intra-op; requiring lower VAD speed and dual inotrope support. Inotropic support was able to be decreased, but pt now remains on dobutamine at 6mcg/kg/min. Pt ans wife have been doing well with LVAD education. Pt has been OOB, is eating well and moving his bowels. Therapeutic on warfarin. LAST 24 HOURS:  No acute events overnight  Net -461mL  VSS and LVAD flows stable- no acute alarms on VAD  CT output 370mL last 24 hrs  Loose stools yesterday  Pt reports feeling well today      REVIEW OF SYSTEMS:  Review of Systems   Constitutional: Negative for chills, fever, malaise/fatigue and weight loss. Respiratory: Negative for cough and shortness of breath. Cardiovascular: Negative for chest pain, palpitations, orthopnea and leg swelling. Gastrointestinal: Positive for diarrhea. Negative for abdominal pain, nausea and vomiting. Neurological: Negative for dizziness and weakness. LIFE GOALS:  Patient's personal goals include: getting stronger and going home soon  Important upcoming milestones or family events:    The patient identifies the following as important for living well: being independent, being at home, enjoying family time        OBJECTIVE:  PHYSICAL EXAM:  Visit Vitals  /72 (BP 1 Location: Right upper arm, BP Patient Position: At rest)   Pulse 80   Temp 98.8 °F (37.1 °C)   Resp 20   Ht 6' 1\" (1.854 m)   Wt 189 lb 4.8 oz (85.9 kg)   SpO2 95%   BMI 24.98 kg/m²       LVAD INTERROGATION:  Device interrogated in person  Device function normal, normal flow, no events  LVAD   Pump Speed (RPM): 4800  Pump Flow (LPM): 3.3  MAP: 78 (L radial doppler)  PI (Pulsitility Index): 6.4  Power: 3.1   Test: No  Back Up  at Bedside & Labeled: Yes  Power Module Test: Yes  Driveline Site Care: Yes  Driveline Dressing: Clean, Dry, and Intact  MAP in Therapeutic Range (Outpatient): Yes  Testing  Alarms Reviewed: Yes  Back up SC speed: 4800  Back up Low Speed Limit: 4400  Emergency Equipment with Patient?: Yes  Emergency procedures reviewed?: Yes  Drive line site inspected?: No  Drive line intergrity inspected?: Yes  Drive line dressing changed?: No      Physical Exam  Vitals reviewed. Constitutional:       General: He is not in acute distress. Appearance: Normal appearance. Cardiovascular:      Rate and Rhythm: Normal rate and regular rhythm. Comments: LVAD Hum noted on auscultation  Pulmonary:      Effort: Pulmonary effort is normal. No respiratory distress. Abdominal:      General: Bowel sounds are normal. There is no distension. Palpations: Abdomen is soft. Tenderness: There is no abdominal tenderness. Musculoskeletal:      Right lower le+ Pitting Edema present. Left lower le+ Pitting Edema present. Skin:     General: Skin is warm and dry. Capillary Refill: Capillary refill takes less than 2 seconds. Neurological:      General: No focal deficit present. Mental Status: He is alert and oriented to person, place, and time.    Psychiatric:         Mood and Affect: Mood normal.         Behavior: Behavior normal.         Thought Content: Thought content normal.         Judgment: Judgment normal.              CARDIAC IMAGING AND DIAGNOSTICS REVIEWED:  Echo (4/8/22)    Left Ventricle: Left ventricle size is normal. Moderately increased wall thickness. Findings consistent with concentric remodeling. Severe global hypokinesis present. Severely reduced left ventricular systolic function with a visually estimated EF of 10 -15%. 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   Aortic Valve: Moderate sclerosis of the aortic valve cusp.   Mitral Valve: Moderately thickened leaflet. Mild transvalvular regurgitation.   Tricuspid Valve: Severe transvalvular regurgitation.   Right Atrium: Right atrium is severely dilated.     Echo (3/2/22)   · LV 10-15%  · Mod-severe MR      Echo (11/23/21):  · LV 15-20%.    · Mod-severe, MR, mod-TR     Echo (5/20/21)  · LV: Estimated LVEF is 20 - 25%. Normal wall thickness. Mildly dilated left ventricle. Severely and globally reduced systolic function. Severe (grade 4) left ventricular diastolic dysfunction. · LA: Severely dilated left atrium. · RA: Severely dilated right atrium. · MV: Moderate mitral valve regurgitation is present. · TV: Moderate tricuspid valve regurgitation is present. · AO: Mild aortic root dilatation. · RV: Pacer/ICD present. · LVED 6.2cm     EKG (5/20/21) SB with 1degree AV block, QRS 116ms     LHC (5/24/21) Native Coronaries: LM - moderate to severe distal disease 50%. % prox occluded (), heavily calcified, LCx: 80% proximal stenosis. OM1 is  (seen filling faintly via collaterals). OM2 99% stenosis. RCA: 100% proximal occluded.  LIMA to LAD: patent, supplies only a diagonal branch (probably D2).  The LAD distal to the bifurcation is 100% occluded () and fills via right to left collaterals off the SVG to RCA. SVG to R-PDA: patent with moderate diffuse irregularities but no obstructive disease in the graft.    No appealing interventional targets.      NST as above     ICD Interrogation (11/12/2021) [employer] VVI, thoracic impedence trending up, no events, normal device function and good battery  ICD interrogation (5/12/21) [employer] single lead, no events, normal device function and good battery     HEMODYNAMICS:  RHC 5/24/21 CI 1.97, PA 33/9/17, RA 1, PCWP 6- off milrinone   CPEST not done     Patient underwent a 6 minute walk test 11/12/2021     6 Min Walk Report 11/12/2021 7/6/2021 5/20/2021   (PRE) HR 88 98 74   (PRE) O2 Sat 100 - 99   (POST)  - 81   (POST) O2 Sat 99 98% on Ra 99   Distance in Meters 386.58 - 1158. 24       OTHER IMAGING:  CXR (6/17/21) clear  CT 5/21/21 1. Irregular pulmonary nodule in the left upper lobe measuring 2 cm, suspicious for primary pulmonary malignancy. 2. Additional 6 mm left upper lobe pulmonary nodule. 3. Severe calcific atherosclerosis of the coronary arteries and abdominal aorta. No aneurysm. 4. Cardiomegaly. 5. No acute abnormality within the chest, abdomen, or pelvis. LABORATORY RESULTS:     Labs Latest Ref Rng & Units 4/17/2022 4/16/2022 4/15/2022 4/14/2022 4/14/2022 4/13/2022 4/12/2022   WBC 4.1 - 11.1 K/uL 9.3 8.8 9.2 - 7.7 8.4 9.1   RBC 4.10 - 5.70 M/uL 2.97(L) 2.94(L) 3.17(L) - 2.93(L) 3.33(L) 3.23(L)   Hemoglobin 12.1 - 17.0 g/dL 7. 7(L) 7. 7(L) 8.2(L) 8.2(L) 7. 7(L) 8.7(L) 8.5(L)   Hematocrit 36.6 - 50.3 % 23. 9(L) 23. 6(L) 25. 5(L) 25. 5(L) 23. 7(L) 26. 9(L) 26. 1(L)   MCV 80.0 - 99.0 FL 80.5 80.3 80.4 - 80.9 80.8 80.8   Platelets 931 - 430 K/uL 205 203 208 - 177 178 161   Lymphocytes 12 - 49 % - - - - - - -   Monocytes 5 - 13 % - - - - - - -   Eosinophils 0 - 7 % - - - - - - -   Basophils 0 - 1 % - - - - - - -   Bands 0 - 6 % - - - - - - -   Albumin 3.5 - 5.0 g/dL 2. 6(L) 2. 7(L) 2. 9(L) - 2. 8(L) 2. 9(L) 2. 8(L)   Calcium 8.5 - 10.1 MG/DL 8.4(L) 8.4(L) 8. 3(L) - 8. 4(L) 8.7 8.5   Glucose 65 - 100 mg/dL 174(H) 118(H) 141(H) - 115(H) 135(H) 157(H)   BUN 6 - 20 MG/DL 23(H) 28(H) 26(H) - 23(H) 22(H) 18   Creatinine 0.70 - 1.30 MG/DL 0.84 0.85 0.87 - 0.83 0.96 0.82   Sodium 136 - 145 mmol/L 128(L) 128(L) 129(L) - 130(L) 131(L) 132(L)   Potassium 3.5 - 5.1 mmol/L 4.8 4.8 4.6 - 4.0 4.4 4.3   TSH 0.36 - 3.74 uIU/mL - - - - - - -   LDH 85 - 241 U/L 196 201 192 - 185 248(H) -   Some recent data might be hidden     Lab Results   Component Value Date/Time    TSH 2.26 04/10/2022 03:00 AM    TSH 1.68 03/01/2022 11:50 AM    TSH 1.47 05/26/2021 10:44 PM    TSH 1.97 05/20/2021 04:28 PM    TSH 1.41 02/09/2021 03:05 PM    TSH 1.740 08/14/2018 09:58 AM    TSH 1.710 10/18/2017 12:00 AM         HISTORY:  PAST MEDICAL HISTORY:  Past Medical History:   Diagnosis Date    CAD (coronary artery disease) '90 '97, '13 x 2    MI, code ice in 2013 at [facility]    Calculus of kidney     Colonic polyps     Congestive heart failure, unspecified     Diabetes ([facility] [address]     Gastritis     Hypercholesterolemia     Sleep apnea        PAST SURGICAL HISTORY:  Past Surgical History:   Procedure Laterality Date    COLONOSCOPY  04/06/2011 16, due 21    COLONOSCOPY N/A 3/2/2022    COLONOSCOPY    :- [facility] ENDOSCOPY    ENDOSCOPY, COLON, DIAGNOSTIC      11, due 16    HX CORONARY ARTERY BYPASS GRAFT  8/22/12    x 4 vessel by S.  James    HX HEENT      LASIK    HX PACEMAKER PLACEMENT  1/30/13    [place] CARDIAC SURG PROCEDURE UNLIST  2012    x 4 vessels       FAMILY HISTORY:  Family History   Problem Relation Age of Onset    Heart Disease Mother         MI    Heart Disease Father         CAD & PVD    Lung Disease Father     Cancer Father         lung    Diabetes Maternal Grandmother     Heart Disease Other         CAD    Stroke Sister        SOCIAL HISTORY:  Social History     Socioeconomic History    Marital status:    Tobacco Use    Smoking status: Never Smoker    Smokeless tobacco: Never Used   Vaping Use    Vaping Use: Never used   Substance and Sexual Activity    Alcohol use:  Yes     Alcohol/week: 0.0 standard drinks     Comment:  VERY RARE    Drug use: No       ALLERGY:  Allergies   Allergen Reactions    Oxycodone Anaphylaxis    Pcn [Penicillins] Hives        CURRENT MEDICATIONS:    Current Facility-Administered Medications:     polyethylene glycol (MIRALAX) packet 17 g, 17 g, Oral, DAILY, Reena Vishal B, NP    senna-docusate (PERICOLACE) 8.6-50 mg per tablet 1 Tablet, 1 Tablet, Oral, DAILY PRN, Candido Martin, NP    potassium chloride SR (KLOR-CON 10) tablet 10 mEq, 10 mEq, Oral, DAILY, Reena [place] B, NP    oxymetazoline (AFRIN) 0.05 % nasal spray 2 Spray, 2 Spray, Both Nostrils, BID PRN, Reena Vishal B, NP    hydrALAZINE (APRESOLINE) tablet 50 mg, 50 mg, Oral, Q8H, Millie Wong T, NP, 50 mg at 04/17/22 0302    losartan (COZAAR) tablet 12.5 mg, 12.5 mg, Oral, DAILY, Polljanny, Cyn Colleton T, NP, 12.5 mg at 04/16/22 0851    cefepime (MAXIPIME) 2 g in 0.9% sodium chloride 10 mL IV syringe, 2 g, IntraVENous, Q12H, Cyn Wong T, NP, 2 g at 04/17/22 0229    amiodarone (CORDARONE) tablet 200 mg, 200 mg, Oral, BID, Cyn Wong Colleton T, NP, 200 mg at 04/16/22 1828    colchicine tablet 0.6 mg, 0.6 mg, Oral, DAILY, Cyn Wong T, NP, 0.6 mg at 04/16/22 0850    alteplase (CATHFLO) 1 mg in sterile water (preservative free) 1 mL injection, 1 mg, InterCATHeter, PRN, Keke Kennedy MD, 1 mg at 04/16/22 1149    bisacodyL (DULCOLAX) suppository 10 mg, 10 mg, Rectal, DAILY PRN, Nino Montague MD, 10 mg at 04/12/22 1533    gabapentin (NEURONTIN) capsule 200 mg, 200 mg, Oral, TID, Cyn Wong NP, 200 mg at 04/16/22 2124    ketorolac (TORADOL) injection 15 mg, 15 mg, IntraVENous, Q6H PRN, Millie Wong NP, 15 mg at 04/17/22 0731    albumin human 25% (BUMINATE) solution 12.5 g, 12.5 g, IntraVENous, BID, Cyn Wong NP, 12.5 g at 04/16/22 1829    famotidine (PEPCID) tablet 20 mg, 20 mg, Oral, Q12H, Daria Reese MD, 20 mg at 04/16/22 2124    bumetanide (BUMEX) injection 1 mg, 1 mg, IntraVENous, Q6H PRN, Daria Reese MD, 1 mg at 04/10/22 1234    mirtazapine (REMERON) tablet 7.5 mg, 7.5 mg, Oral, QHS, Zeus Stewartin KAVON, NP, 7.5 mg at 04/16/22 2124    lidocaine 4 % patch 1 Patch, 1 Patch, TransDERmal, Q24H, Zeus Stewartin KAVON, NP, 1 Patch at 04/16/22 1419    Warfarin NP/MD dosing, , Other, PRN, Terry, Danica B, NP    melatonin tablet 3 mg, 3 mg, Oral, QHS PRN, Marco Luis MD, 3 mg at 04/10/22 0251    hydrALAZINE (APRESOLINE) 20 mg/mL injection 10 mg, 10 mg, IntraVENous, Q6H PRN, Geni JACOBSON NP, 10 mg at 04/10/22 0245    aspirin chewable tablet 81 mg, 81 mg, Oral, DAILY, Geni JACOBSON NP, 81 mg at 04/16/22 0849    hydrALAZINE (APRESOLINE) 20 mg/mL injection 5 mg, 5 mg, IntraVENous, Q6H PRN, Geni JACOBSON NP, 5 mg at 04/13/22 2535    sodium chloride (NS) flush 5-40 mL, 5-40 mL, IntraVENous, Q8H, Duke Wong NP, 10 mL at 04/16/22 2134    sodium chloride (NS) flush 5-40 mL, 5-40 mL, IntraVENous, PRN, Carolina Wong NP    0.9% sodium chloride infusion, 9 mL/hr, IntraVENous, CONTINUOUS, Duke Wong NP, Last Rate: 3 mL/hr at 04/17/22 0235, 3 mL/hr at 04/17/22 0235    acetaminophen (TYLENOL) tablet 650 mg, 650 mg, Oral, Q6H PRN, Millie Wong NP, 650 mg at 04/16/22 0834    naloxone (NARCAN) injection 0.4 mg, 0.4 mg, IntraVENous, PRN, Carolina Wong NP    ond[facility]etron [facility]) injection 4 mg, 4 mg, IntraVENous, Q4H PRN, Duke Wong NP, 4 mg at 04/11/22 2139    albuterol-ipratropium (DUO-NEB) 2.5 MG-0.5 MG/3 ML, 3 mL, Nebulization, Q6H PRN, Carolina Wong NP    midazolam (VERSED) injection 1 mg, 1 mg, IntraVENous, Q1H PRN, Carolina Wong NP    ELECTROLYTE REPLACEMENT NOTE: Nurse to review Serum Potassium and Magnesuim levels and Initiate Electrolyte Replacement Protocol as needed, 1 Each, Other, PRN, Marilu Wong NP    insulin regular (NOVOLIN R, HUMULIN R) 100 Units in 0.9% sodium chloride 100 mL infusion, 0-50 Units/hr, IntraVENous, TITRTWIN, Marilu Wong NP, Stopped at 04/09/22 1619    glucose chewable tablet 16 g, 4 Tablet, Oral, PRN, Marilu Wong NP    glucagon (GLUCAGEN) injection 1 mg, 1 mg, IntraMUSCular, PRN, Marilu Wong NP    insulin lispro (HUMALOG) injection, , SubCUTAneous, AC&HS, Marvin, Zuhaire Jeffe T, NP, 1 Units at 04/16/22 2133    dextrose 10 % infusion 0-250 mL, 0-250 mL, IntraVENous, PRN, Marilu Wong NP, Last Rate: 999 mL/hr at 04/07/22 2145, 45 mL at 04/07/22 2145    sucralfate (CARAFATE) tablet 1 g, 1 g, Oral, AC&HS, Marilu Wong NP, 1 g at 04/16/22 2124    0.45% sodium chloride infusion, 10 mL/hr, IntraVENous, CONTINUOUS, Critical access hospitalMajo camacho MD, Stopped at 04/10/22 0730    fentaNYL citrate (PF) injection 25 mcg, 25 mcg, IntraVENous, Q2H PRN, Marilu Wong NP, 25 mcg at 04/16/22 2133    DOBUTamine (DOBUTREX) 1,000 mg/250 mL (4,000 mcg/mL) infusion, 0-10 mcg/kg/min, IntraVENous, TITRATE, Marilu Wong NP, Last Rate: 6.8 mL/hr at 04/16/22 2025, 6 mcg/kg/min at 04/16/22 2025    PATIENT CARE TEAM:  Patient Care Team:  Gay oMrocho MD as PCP - General (Internal Medicine)  Gay Morocho MD as PCP - REHABILITATION [facility] Provider  Oly Rodriguez MD as Physician (Cardiology)  Flo Fields MD as Physician (Gastroenterology)  Kristen Wheeler MD as Physician (Orthopedic Surgery)  Chelle Durham MD as Physician (Ophthalmology)  Darvin Escamilla MD (Cardiology)  Radha Singh MD (Cardiology)         Thank you for allowing us to participate in this patient's care.       Williams Alberts NP   [address] [place] [address], [address]  Phone: [phone]  Fax: [phone]

## 2022-04-18 ENCOUNTER — APPOINTMENT (OUTPATIENT)
Dept: GENERAL RADIOLOGY | Age: 77
DRG: 001 | End: 2022-04-18
Attending: NURSE PRACTITIONER
Payer: MEDICARE

## 2022-04-18 LAB
ALBUMIN SERPL-MCNC: 2.6 G/DL (ref 3.5–5)
ALBUMIN/GLOB SERPL: 0.8 {RATIO} (ref 1.1–2.2)
ALP SERPL-CCNC: 122 U/L (ref 45–117)
ALT SERPL-CCNC: 24 U/L (ref 12–78)
ANION GAP SERPL CALC-SCNC: 4 MMOL/L (ref 5–15)
APTT PPP: 40.1 SEC (ref 22.1–31)
AST SERPL-CCNC: 60 U/L (ref 15–37)
BILIRUB SERPL-MCNC: 0.9 MG/DL (ref 0.2–1)
BNP SERPL-MCNC: 3157 PG/ML
BUN SERPL-MCNC: 26 MG/DL (ref 6–20)
BUN/CREAT SERPL: 29 (ref 12–20)
CALCIUM SERPL-MCNC: 8.3 MG/DL (ref 8.5–10.1)
CHLORIDE SERPL-SCNC: 99 MMOL/L (ref 97–108)
CO2 SERPL-SCNC: 24 MMOL/L (ref 21–32)
CREAT SERPL-MCNC: 0.89 MG/DL (ref 0.7–1.3)
ERYTHROCYTE [DISTWIDTH] IN BLOOD BY AUTOMATED COUNT: 22.5 % (ref 11.5–14.5)
GLOBULIN SER CALC-MCNC: 3.2 G/DL (ref 2–4)
GLUCOSE BLD STRIP.AUTO-MCNC: 144 MG/DL (ref 65–117)
GLUCOSE BLD STRIP.AUTO-MCNC: 153 MG/DL (ref 65–117)
GLUCOSE BLD STRIP.AUTO-MCNC: 173 MG/DL (ref 65–117)
GLUCOSE BLD STRIP.AUTO-MCNC: 192 MG/DL (ref 65–117)
GLUCOSE SERPL-MCNC: 153 MG/DL (ref 65–100)
HCT VFR BLD AUTO: 25.1 % (ref 36.6–50.3)
HGB BLD-MCNC: 8.1 G/DL (ref 12.1–17)
INR PPP: 1.9 (ref 0.9–1.1)
LACTATE SERPL-SCNC: 0.9 MMOL/L (ref 0.4–2)
LDH SERPL L TO P-CCNC: 192 U/L (ref 85–241)
MAGNESIUM SERPL-MCNC: 2.2 MG/DL (ref 1.6–2.4)
MCH RBC QN AUTO: 26.1 PG (ref 26–34)
MCHC RBC AUTO-ENTMCNC: 32.3 G/DL (ref 30–36.5)
MCV RBC AUTO: 81 FL (ref 80–99)
NRBC # BLD: 0 K/UL (ref 0–0.01)
NRBC BLD-RTO: 0 PER 100 WBC
PLATELET # BLD AUTO: 243 K/UL (ref 150–400)
PMV BLD AUTO: 9.7 FL (ref 8.9–12.9)
POTASSIUM SERPL-SCNC: 5 MMOL/L (ref 3.5–5.1)
PROT SERPL-MCNC: 5.8 G/DL (ref 6.4–8.2)
PROTHROMBIN TIME: 19.5 SEC (ref 9–11.1)
RBC # BLD AUTO: 3.1 M/UL (ref 4.1–5.7)
SERVICE CMNT-IMP: ABNORMAL
SODIUM SERPL-SCNC: 127 MMOL/L (ref 136–145)
THERAPEUTIC RANGE,PTTT: ABNORMAL SECS (ref 58–77)
WBC # BLD AUTO: 9.6 K/UL (ref 4.1–11.1)

## 2022-04-18 PROCEDURE — 83735 ASSAY OF MAGNESIUM: CPT

## 2022-04-18 PROCEDURE — 83605 ASSAY OF LACTIC ACID: CPT

## 2022-04-18 PROCEDURE — 85027 COMPLETE CBC AUTOMATED: CPT

## 2022-04-18 PROCEDURE — 74011250637 HC RX REV CODE- 250/637: Performed by: NURSE PRACTITIONER

## 2022-04-18 PROCEDURE — 97530 THERAPEUTIC ACTIVITIES: CPT

## 2022-04-18 PROCEDURE — 74011000250 HC RX REV CODE- 250: Performed by: NURSE PRACTITIONER

## 2022-04-18 PROCEDURE — 83615 LACTATE (LD) (LDH) ENZYME: CPT

## 2022-04-18 PROCEDURE — 97116 GAIT TRAINING THERAPY: CPT

## 2022-04-18 PROCEDURE — P9047 ALBUMIN (HUMAN), 25%, 50ML: HCPCS | Performed by: NURSE PRACTITIONER

## 2022-04-18 PROCEDURE — 85610 PROTHROMBIN TIME: CPT

## 2022-04-18 PROCEDURE — 93750 INTERROGATION VAD IN PERSON: CPT | Performed by: THORACIC SURGERY (CARDIOTHORACIC VASCULAR SURGERY)

## 2022-04-18 PROCEDURE — 74011250636 HC RX REV CODE- 250/636: Performed by: NURSE PRACTITIONER

## 2022-04-18 PROCEDURE — 74011000258 HC RX REV CODE- 258: Performed by: NURSE PRACTITIONER

## 2022-04-18 PROCEDURE — 99291 CRITICAL CARE FIRST HOUR: CPT | Performed by: THORACIC SURGERY (CARDIOTHORACIC VASCULAR SURGERY)

## 2022-04-18 PROCEDURE — 83880 ASSAY OF NATRIURETIC PEPTIDE: CPT

## 2022-04-18 PROCEDURE — 82962 GLUCOSE BLOOD TEST: CPT

## 2022-04-18 PROCEDURE — 93750 INTERROGATION VAD IN PERSON: CPT | Performed by: INTERNAL MEDICINE

## 2022-04-18 PROCEDURE — 71045 X-RAY EXAM CHEST 1 VIEW: CPT

## 2022-04-18 PROCEDURE — 80053 COMPREHEN METABOLIC PANEL: CPT

## 2022-04-18 PROCEDURE — 36415 COLL VENOUS BLD VENIPUNCTURE: CPT

## 2022-04-18 PROCEDURE — 74011250637 HC RX REV CODE- 250/637: Performed by: INTERNAL MEDICINE

## 2022-04-18 PROCEDURE — 85730 THROMBOPLASTIN TIME PARTIAL: CPT

## 2022-04-18 PROCEDURE — 99233 SBSQ HOSP IP/OBS HIGH 50: CPT | Performed by: NURSE PRACTITIONER

## 2022-04-18 PROCEDURE — 97535 SELF CARE MNGMENT TRAINING: CPT

## 2022-04-18 PROCEDURE — 93750 INTERROGATION VAD IN PERSON: CPT | Performed by: NURSE PRACTITIONER

## 2022-04-18 PROCEDURE — 65610000003 HC RM ICU SURGICAL

## 2022-04-18 PROCEDURE — 99232 SBSQ HOSP IP/OBS MODERATE 35: CPT | Performed by: INTERNAL MEDICINE

## 2022-04-18 RX ADMIN — ACETAMINOPHEN 650 MG: 325 TABLET ORAL at 13:44

## 2022-04-18 RX ADMIN — GABAPENTIN 200 MG: 100 CAPSULE ORAL at 08:16

## 2022-04-18 RX ADMIN — SODIUM CHLORIDE, PRESERVATIVE FREE 10 ML: 5 INJECTION INTRAVENOUS at 21:17

## 2022-04-18 RX ADMIN — FAMOTIDINE 20 MG: 20 TABLET ORAL at 08:16

## 2022-04-18 RX ADMIN — AMIODARONE HYDROCHLORIDE 200 MG: 200 TABLET ORAL at 17:38

## 2022-04-18 RX ADMIN — FENTANYL CITRATE 25 MCG: 0.05 INJECTION, SOLUTION INTRAMUSCULAR; INTRAVENOUS at 07:31

## 2022-04-18 RX ADMIN — ASPIRIN 81 MG CHEWABLE TABLET 81 MG: 81 TABLET CHEWABLE at 08:16

## 2022-04-18 RX ADMIN — HYDRALAZINE HYDROCHLORIDE 50 MG: 50 TABLET, FILM COATED ORAL at 21:16

## 2022-04-18 RX ADMIN — SUCRALFATE 1 G: 1 TABLET ORAL at 11:16

## 2022-04-18 RX ADMIN — SODIUM CHLORIDE, PRESERVATIVE FREE 10 ML: 5 INJECTION INTRAVENOUS at 13:43

## 2022-04-18 RX ADMIN — WARFARIN SODIUM 6 MG: 5 TABLET ORAL at 17:38

## 2022-04-18 RX ADMIN — ALBUMIN (HUMAN) 12.5 G: 0.25 INJECTION, SOLUTION INTRAVENOUS at 08:16

## 2022-04-18 RX ADMIN — GABAPENTIN 200 MG: 100 CAPSULE ORAL at 16:30

## 2022-04-18 RX ADMIN — SUCRALFATE 1 G: 1 TABLET ORAL at 21:16

## 2022-04-18 RX ADMIN — SUCRALFATE 1 G: 1 TABLET ORAL at 16:30

## 2022-04-18 RX ADMIN — HYDRALAZINE HYDROCHLORIDE 50 MG: 50 TABLET, FILM COATED ORAL at 11:16

## 2022-04-18 RX ADMIN — FAMOTIDINE 20 MG: 20 TABLET ORAL at 21:16

## 2022-04-18 RX ADMIN — GABAPENTIN 200 MG: 100 CAPSULE ORAL at 21:16

## 2022-04-18 RX ADMIN — AMIODARONE HYDROCHLORIDE 200 MG: 200 TABLET ORAL at 08:16

## 2022-04-18 RX ADMIN — FENTANYL CITRATE 25 MCG: 0.05 INJECTION, SOLUTION INTRAMUSCULAR; INTRAVENOUS at 10:48

## 2022-04-18 RX ADMIN — SODIUM CHLORIDE 3 ML/HR: 9 INJECTION, SOLUTION INTRAVENOUS at 05:03

## 2022-04-18 RX ADMIN — POLYETHYLENE GLYCOL 3350 17 G: 17 POWDER, FOR SOLUTION ORAL at 08:16

## 2022-04-18 RX ADMIN — HYDRALAZINE HYDROCHLORIDE 10 MG: 20 INJECTION INTRAMUSCULAR; INTRAVENOUS at 18:09

## 2022-04-18 RX ADMIN — SODIUM CHLORIDE 2 G: 9 INJECTION INTRAMUSCULAR; INTRAVENOUS; SUBCUTANEOUS at 03:21

## 2022-04-18 RX ADMIN — CEFEPIME 2 G: 2 INJECTION, POWDER, FOR SOLUTION INTRAVENOUS at 15:10

## 2022-04-18 RX ADMIN — SODIUM CHLORIDE, PRESERVATIVE FREE 10 ML: 5 INJECTION INTRAVENOUS at 06:38

## 2022-04-18 RX ADMIN — MIRTAZAPINE 7.5 MG: 15 TABLET, FILM COATED ORAL at 21:17

## 2022-04-18 RX ADMIN — LOSARTAN POTASSIUM 12.5 MG: 25 TABLET, FILM COATED ORAL at 08:16

## 2022-04-18 RX ADMIN — COLCHICINE 0.6 MG: 0.6 TABLET, FILM COATED ORAL at 08:16

## 2022-04-18 RX ADMIN — SUCRALFATE 1 G: 1 TABLET ORAL at 06:39

## 2022-04-18 RX ADMIN — HYDRALAZINE HYDROCHLORIDE 50 MG: 50 TABLET, FILM COATED ORAL at 03:21

## 2022-04-18 RX ADMIN — ALBUMIN (HUMAN) 12.5 G: 0.25 INJECTION, SOLUTION INTRAVENOUS at 17:38

## 2022-04-18 NOTE — WOUND CARE
WOCN Note:     New consult placed for assessment of sacrum and gluteal cleft    Chart reviewed. Assessed in room 4332. Admitted DX:  HFrEF (heart failure with reduced ejection fraction)  4.5 s/p LVAD    Assessment:   Patient is A&O x 4, communicative, continent and sitting up in chair. Stands with assistance. Bed: [facility]  Patient reports no pain. Heels intact without erythema. 1. Sacrum/Gluteal cleft, MASD : 4 x 0.2 x 0.1 cm  100% red; no exudate; no odor. Periwound without erythema. Applied Zinc cream and sacral foam dressing. Wound, Pressure Prevention & Skin Care Recommendations:    1. Minimize layers of linen/pads under patient to optimize support surface. 2.  Turn/reposition approximately every 2 hours and offload heels. 3.  Manage moisture/ Keep skin folds clean and dry/minimize brief usage. 4.  Specialty bed: [facility].  Use only flat sheet and one incontinence pad.   5.  Sacrum/gluteal cleft:  Every 8 hours roll back sacral foam and apply Zinc cream; change sacral foam every 3 days and as needed. Discussed above plan with patient and Swetha BRADLEY.     Transition of Care:   Plan to follow as needed while admitted to hospital.    TOSHA Hill RN [facility] Inpatient Wound Care  Available on [employer]  Office [phone]

## 2022-04-18 NOTE — PROGRESS NOTES
Problem: Mobility Impaired (Adult and Pediatric)  Goal: *Acute Goals and Plan of Care (Insert Text)  Description: FUNCTIONAL STATUS PRIOR TO ADMISSION: Patient was independent and active without use of DME.    HOME SUPPORT PRIOR TO ADMISSION: The patient lived with his wife but did not require assist.    Physical Therapy Goals  Weekly Reassessment 4/15/2022 (goals progressed)  1. Patient will move from supine to sit and sit to supine , scoot up and down, and roll side to side in bed with head of bed flat with supervision/set-up within 7 days. 2.  Patient will perform sit to/from stand from chair height with supervision/set-up within 7 days. 3.  Patient will ambulate 300 feet stand by assist within 7 days. 4.  Patient will ascend/descend 6 stairs with handrail(s) with minimal assistance/contact guard assist within 14 days. 5.  Patient will perform cardiac exercises per protocol with supervision/set-up within 7 days. 6.  Patient will verbally and functionally recall 3/3 sternal precautions within 7 days. 7.  Patient will perform power exchange for power module to/from battery with stand by assist within 7 days. Initiated 4/8/2022  1. Patient will move from supine to sit and sit to supine , scoot up and down, and roll side to side in bed with supervision/set-up within 7 days. 2.  Patient will perform sit to/from stand with supervision/set-up within 7 days. 3.  Patient will ambulate 50 feet with least restrictive assistive device and minimal assistance/contact guard assist within 7 days. 4.  Patient will ascend/descend 6 stairs with handrail(s) with minimal assistance/contact guard assist within 14 days. 5.  Patient will perform cardiac exercises per protocol with supervision/set-up within 7 days. 6.  Patient will verbally and functionally recall 3/3 sternal precautions within 7 days.   7.  Patient will perform power exchange for power module to/from battery with minimal assistance within 7 days.              Outcome: Progressing Towards Goal   PHYSICAL THERAPY TREATMENT  Patient: Juan Manuel Rea (79 y.o. male)  Date: 4/18/2022  Diagnosis: HFrEF (heart failure with reduced ejection fraction) ([facility]) [I50.20] <principal problem not specified>  Procedure(s) (LRB):  REDO STERNOTOMY; RIGHT GROIN CUTDOWN FOR CANNULATION;  INSERTION OF LEFT VENTRICULAR ASSIST DEVICE (HMIII); AORTIC VALVE CLOSURE CHEL BY DR. Fran Kilgore. (N/A) 13 Days Post-Op  Precautions: Fall (mindful movement, LVAD)  Chart, physical therapy assessment, plan of care and goals were reviewed. ASSESSMENT  Patient continues with skilled PT services and is progressing towards goals. Patient received in chair, agreeable to therapy and on battery power. Able to ambulate with intermittent path deviations, able to self correct but with delayed righting reactions. SpO2 stable on room air and minimal SOB although requiring 2 standing rest breaks. Performed battery>power module change over with independence and reviewed low battery alarms on his  (time left, ability to silence, red vs yellow alarm). Patient with good recall of battery life information. Recommend HHPT. Current Level of Function Impacting Discharge (mobility/balance): CGA-min A    Other factors to consider for discharge: on room air, occasional min A to stand          PLAN :  Patient continues to benefit from skilled intervention to address the above impairments. Continue treatment per established plan of care. to address goals. Recommendation for discharge: (in order for the patient to meet his/her long term goals)  Physical therapy at least 2 days/week in the home     This discharge recommendation:  Has been made in collaboration with the attending provider and/or case management    IF patient discharges home will need the following DME: none       SUBJECTIVE:   Patient stated I'm just hurting.     OBJECTIVE DATA SUMMARY:     Critical Behavior:  Neurologic State: Alert  Orientation Level: Oriented X4  Cognition: Appropriate decision making,Appropriate for age attention/concentration,Appropriate safety awareness,Follows commands  Functional Mobility Training:  Bed Mobility:  Sit to Supine: Moderate assistance  Scooting: Stand-by assistance (cueing to maintain move in tube)  Transfers:  Sit to Stand: Minimum assistance  Stand to Sit: Contact guard assistance  Balance:  Sitting: Intact  Standing: Impaired; Without support  Standing - Static: Good  Standing - Dynamic : Fair  Ambulation/Gait Training:  Distance (ft): 150 Feet (ft)  Ambulation - Level of Assistance: Contact guard assistance  Gait Abnormalities: Decreased step clearance; Path deviations  Base of Support: Center of gravity altered  Speed/Charlene: Pace decreased (<100 feet/min)    VAD power transition    Patient performed switchover from battery to power module. Completed the following tasks:   Independent Verbal cues Physical assistance Comments   Remove batteries from holster x      Disconnect power leads from battery x      Reconnect power leads into power module x      Remove batteries from clips x      Doff holster vest   x Only due to lines         Pain Rating:  Took tylenol just prior to session    Activity Tolerance:   Good and requires frequent rest breaks      After treatment patient left in no apparent distress:   Supine in bed, Heels elevated for pressure relief, Call bell within reach, and Side rails x 3    COMMUNICATION/COLLABORATION:   The patients plan of care was discussed with: Occupational therapist and Registered nurse.      Marycarmen Herrera PT, DPT   Time Calculation: 31 mins

## 2022-04-18 NOTE — PROGRESS NOTES
1600- Bedside report received. Assessment performed. 1800- 10mg Hydralazine given for persistent MAPs greater than 90.    2000- Bedside and Verbal shift change report given to EM RN (oncoming nurse) by Gail Crook RN (offgoing nurse). Report given with SBAR, Kardex, Intake/Output and MAR.

## 2022-04-18 NOTE — PROGRESS NOTES
Problem: Self Care Deficits Care Plan (Adult)  Goal: *Acute Goals and Plan of Care (Insert Text)  Description: FUNCTIONAL STATUS PRIOR TO ADMISSION: pt lives with wife, independent prior    HOME SUPPORT: The patient lived with wife but did not require assist.    Occupational Therapy Goals  Goals reviewed and remain appropriate 4/18/2022  Initiated 4/8/2022  1. Patient will perform ADLs standing 5 mins without fatigue or LOB with supervision/set-up within 7 day(s). 2.  Patient will perform lower body ADLs with minimal assistance/contact guard assist within 7 day(s). 3.  Patient will perform upper body ADLs with minimal assistance/contact guard assist within 7 day(s). 4.  Patient will perform toilet transfers with minimal assistance/contact guard assist within 7 day(s). 5.  Patient will perform all aspects of toileting with minimal assistance/contact guard assist within 7 day(s). 6.  Patient will participate in cardiac/sternal upper extremity therapeutic exercise/activities to increase independence with ADLs with supervision/set-up for 5 minutes within 7 day(s). 7.  Patient will perform VAD switchover routine as part of ADL routine (including batteries<>batteries, battery clip<>battery clip, mock SC<>SC) with minimal assistance/contact guard assist within 7 days. Outcome: Progressing Towards Goal   OCCUPATIONAL THERAPY TREATMENT/ WEEKLY RE-ASSESS  Patient: Debi Dugan (47 y.o. male)  Date: 4/18/2022  Diagnosis: HFrEF (heart failure with reduced ejection fraction) ([facility]) [I50.20] <principal problem not specified>  Procedure(s) (LRB):  REDO STERNOTOMY; RIGHT GROIN CUTDOWN FOR CANNULATION;  INSERTION OF LEFT VENTRICULAR ASSIST DEVICE (HMIII); AORTIC VALVE CLOSURE CHEL BY DR. [facility]. (N/A) 13 Days Post-Op  Precautions: Fall (move in the tube, LVAD)  Chart, occupational therapy assessment, plan of care, and goals were reviewed.     ASSESSMENT  Patient continues with skilled OT services and is progressing towards goals. Patient received sitting in recliner with family member present, agreeable to working with therapy. Patient presents with good static standing balance, improving activity tolerance, continues to require cueing for switchover from PM to batteries, and fair standing tolerance as standing for ADLs today. Patient performed switchover to batteries with cueing for connecting power cords to batteries and physical assistance for line management to avoid tangling. Patient donned vest brought in from home and placing batteries into internal pockets and  into zippered pocket with neck strap in place for safety. Patient then stood at raised tray table ~5 minutes to complete grooming to brush teeth and wash face. Patient demonstrated legs crossed method for lower extremity dressing while sitting in chair to avoid deep bending. Overall patient did well with session, will continue to reinforce/practice switchovers to promote independence. Family member present and engaged during education provided this session. Patient would benefit from skilled OT services during admission to improve independence with self care and functional mobility/transfers. Anticipate recommend discharge to [facility] with family care. Patient is verbalizing and is not demonstrating understanding of mindful-based movements (\"move in the tube\") principles of keeping UEs proximal to ribcage to prevent lateral pull on the sternum during load-bearing activities with visual and verbal cues required for compliance. Current Level of Function Impacting Discharge (ADLs): SBA to CGA for mobility/transfers, mod A upper extremity dressing, SBA/set-up grooming in standing    Other factors to consider for discharge: prior level of function independent, lives with wife, new LVAD         PLAN :  Patient continues to benefit from skilled intervention to address the above impairments.   Continue treatment per established plan of care to address goals. Recommend with staff: up to chair 3x/day for meals, BSC for toileting    Recommend next OT session: cardiac post op exercises, reinforce move in tube, switchovers and LVAD education    Recommendation for discharge: (in order for the patient to meet his/her long term goals)  Occupational therapy at least 2 days/week in the home AND ensure assist and/or supervision for safety with LVAD maintenance     This discharge recommendation:  Has been made in collaboration with the attending provider and/or case management    IF patient discharges home will need the following DME: none       SUBJECTIVE:   Patient stated I didn't like that other vest, this one is better.     OBJECTIVE DATA SUMMARY:   Cognitive/Behavioral Status:  Neurologic State: Alert  Orientation Level: Oriented X4  Cognition: Appropriate decision making; Appropriate for age attention/concentration; Appropriate safety awareness; Follows commands             Functional Mobility and Transfers for ADLs:  Bed Mobility:  Scooting: Stand-by assistance (cueing to maintain move in tube)    Transfers:  Sit to Stand: Contact guard assistance          Balance:  Sitting: Intact  Standing: Impaired; Without support  Standing - Static: Good    ADL Intervention:       Grooming  Position Performed: Standing  Washing Face: Set-up; Stand-by assistance  Brushing Teeth: Set-up; Stand-by assistance              Upper Body Dressing Assistance  Front Opened Shirt: Moderate assistance (to don vest)    Lower Body Dressing Assistance  Leg Crossed Method Used: Yes  Position Performed: Seated in chair       Patient demonstrated switchover from power module to battery in prep for ADL routine with minimal assistance and verbal cues.    Demonstrated the following tasks:    Independent Verbal cues Physical assistance Comments   Check PM & SC x   Family member completed   Ensure  clipped onto stabilization belt   x    Remove front (green lighted) batteries from , place back batteries into front slots   x Patient cued therapist to complete   Check batteries x      Position batteries into battery clips x      Don holster vest   x    Disconnect power leads x      Insert power lead into battery clip  x     Raymond batteries in holster  x            Patient instructed and educated on mindful movement principles based on Move in [place] concept to include maintaining bilateral elbows close to rib cage when performing any load-bearing activity such as getting in/out of bed, pushing up from a chair, opening a door, or lifting a box. Patient was given a handout with diagrams of each correct/incorrect method of performing each of the above tasks. Patient instructed on the ability to utilize upper extremities outside the tube when doing any non-load bearing activity such as washing hair/body, brushing teeth, retrieving clothing items, or scratching your back. Patient encouraged to also perform upper extremity exercises \"outside of the tube\" to prevent scar tissue formation around sternal incision site. Patient instructed no asymmetrical reaching over head to ensure B UEs when shoulders >90* i.e. reaching in cabinets and dressing. Instruction on upper body dressing techniques of over head, then arms through to decrease pain and unilateral shoulder flexion >90*. Instruction on the benefits of utilizing B UEs during functional tasks i.e. opening the fridge, stepping into the tub. Instruction if continued pain at home with shoulder IR for BM hygiene can use wet wipes and toilet tongs PRN. Avoid valsalva maneuvers. May have to adjust home setup to increase ease with items closer to waist height to prevent deep bending and the automatic  of asymmetrical UE WB/pushing for stabilization during bending. Benefit to don clothing tailor sitting and don all clothing while sitting prior to standing.  Patient demonstrated lower body dressing by performing legs crossed method. Pain:  Pain across chest, worse earlier in AM    Activity Tolerance:   Good and SpO2 stable on RA    After treatment patient left in no apparent distress:   Sitting in chair, Call bell within reach and Caregiver / family present    COMMUNICATION/COLLABORATION:   The patients plan of care was discussed with: Physical therapist and Registered nurse.      Param Bean OTR/L  Time Calculation: 23 mins

## 2022-04-18 NOTE — PROGRESS NOTES
CefepimeDosing/Monitoring  Indication: SSTI  Current regimen:  2 gm IV every 12 hr over 30 min  Recent Labs     04/18/22  0438 04/17/22  0251 04/16/22  0435   CREA 0.89 0.84 0.85   BUN 26* 23* 28*     Estimated CrCl:  80 ml/min  Plan:   Dose adjusted to 2g IV q12h (4 hour infusion) for CrCl > 60 mL/min per extended interval protocol.

## 2022-04-18 NOTE — PROGRESS NOTES
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 Now POD 13 s/p LVAD implant with HM3 as DT due to age (4/5/22). Post-op course complicated by RV failure requiring prolonged inotropic support with dobutamine, congestive hepatopathy, superficial soft tissue infection at sternotomy incision requiring resumption of antibiotics. Pt screened for LOS. Noted nursing documentation supporting good PO intake. Attempted to meet with pt, but PT came by to work with him. RN confirmed excellent PO intake and consuming ONS. Weight presently up d/t fluid. Standing scale on admission ~166 lb, which indicates wt loss if previous weights accurate/ not skewed d/t fluid. Suspect actual wt loss has taken place as appears pt was previously admitted 5/2021 for LVAD w/u, but was found to have a pulmonary nodule during w/u and found to have adenocarcinoma of the lung, so LVAD was deferred pending treatment. Pt has now completed XRT and was cleared for LVAD by heme/onc. From visual glance today, pt appeared to have significant wasting. However, RD assessment last year prior to above, indicates pt with severe wasting at that time as well. Admission wt ~90% IBW and BMI ~21.9 kg/m² supporting underweight status for age.     Happy to hear pt is eating well and consuming ONS (switched to Glucerna yesterday d/t BG and continues to drink without issue - likes all flavors). Will check back with pt when available as he is certainly at risk for malnutrition and likely meets criteria. Malnutrition Assessment:  Malnutrition Status:   At risk for malnutrition (specify) (hx of severe malnutrition, CHF, new LVAD)    Context:  Chronic illness         Nutritionally Significant Medications:   Buminate, cefepime, dobutamine, pepcid, fentanyl, SSI, cozaar, remeron, miralax, klor, pericolace, carafate QID    Estimated Daily Nutrient Needs:   Energy (kcal): 4333-4665 (30-35 kcal/kg)  Wt used: Admission (75.3 kg)  Protein (gm): 115-135 (1.5-1.8 gm/kg)    Fluid (mL/day): 1 mL/kcal       Nutrition Related Findings:   Edema:  Genital: Trace (4/17/2022  8:00 PM)  LLE: 2+; Pitting (4/18/2022  8:00 AM)  LUE: Trace (4/18/2022  8:00 AM)  RLE: 2+; Pitting (4/18/2022  8:00 AM)  RUE: Trace (4/18/2022  8:00 AM)      Last BM: 04/18/22, Formed    Wounds:    Moisture associate skin damage       Current Nutrition Therapies:  Diet: consistent CHO 60 gm, NCS  Supplements: Glucerna TID with meals  Meal intake:   Patient Vitals for the past 168 hrs:   % Diet Eaten   04/18/22 0836 76 - 100%   04/17/22 1300 76 - 100%   04/17/22 1000 76 - 100%   04/16/22 1200 51 - 75%   04/16/22 0900 76 - 100%   04/15/22 0800 51 - 75%   04/14/22 1850 51 - 75%   04/14/22 1800 51 - 75%   04/14/22 1202 76 - 100%   04/14/22 0830 76 - 100%   04/13/22 1900 76 - 100%   04/13/22 1300 76 - 100%   04/13/22 1000 76 - 100%   04/12/22 1209 76 - 100%   04/12/22 0906 51 - 75%   04/11/22 2000 51 - 75%   04/11/22 1300 76 - 100%     Supplement intake:  Patient Vitals for the past 168 hrs:   Supplement intake %   04/17/22 1300 76 - 100%   04/17/22 1000 76 - 100%   04/15/22 0800 76 - 100%   04/14/22 1202 76 - 100%       Anthropometric Measures:  · Height:  6' 1\" (185.4 cm)  · Current Body Wt:  90.5 kg (199 lb 8 oz)   · Admission Body Wt:  166 lb 0.1 oz (75.3 kg)   · Ideal Body Wt:  184 lbs:  108.4 %   · BMI Category:  Underweight (BMI less than 22) age over 72 (using admission wt)       Wt Readings from Last 20 Encounters:   04/18/22 90.5 kg (199 lb 8 oz)   03/31/22 75.3 kg (166 lb)   03/08/22 75.4 kg (166 lb 3.2 oz)   03/04/22 75.4 kg (166 lb 3.6 oz)   02/24/22 76.5 kg (168 lb 9.6 oz)   02/14/22 76.7 kg (169 lb)   02/11/22 78.8 kg (173 lb 12.8 oz)   02/05/22 80.9 kg (178 lb 5.6 oz)   01/31/22 88 kg (194 lb)   01/24/22 88.7 kg (195 lb 9.6 oz)   01/19/22 88.9 kg (196 lb)   01/17/22 88.9 kg (196 lb)   01/12/22 89.8 kg (198 lb)   01/11/22 89.4 kg (197 lb)   12/27/21 92.5 kg (204 lb)   12/20/21 88 kg (194 lb)   12/15/21 87.1 kg (192 lb)   12/08/21 88 kg (194 lb)   12/06/21 88 kg (194 lb)   12/01/21 88.5 kg (195 lb)       Nutrition Diagnosis:   · Increased nutrient needs related to cardiac dysfunction,catabolic illness as evidenced by BMI,weight loss (CHF, recent LVAD)      Nutrition Interventions:   Food and/or Nutrient Delivery: Continue current diet,Continue oral nutrition supplement  Nutrition Education and Counseling: No recommendations at this time  Coordination of Nutrition Care: Continue to monitor while inpatient    Goals:  continued PO intake >/=75% of most meals with 2-3 ONS daily over next 7 days       Nutrition Monitoring and Evaluation:   Behavioral-Environmental Outcomes: None identified  Food/Nutrient Intake Outcomes: Food and nutrient intake,Supplement intake  Physical Signs/Symptoms Outcomes: Biochemical data,Meal time behavior,Fluid status or edema,Hemodynamic status,Nutrition focused physical findings,Weight      Discharge Planning:    Continue oral nutrition supplement     Recent Labs     04/18/22  0438 04/17/22  0251 04/16/22  0435   * 174* 118*   BUN 26* 23* 28*   CREA 0.89 0.84 0.85   * 128* 128*   K 5.0 4.8 4.8   CL 99 98 99   CO2 24 24 23   CA 8.3* 8.4* 8.4*   MG 2.2 2.1 2.2       Recent Labs     04/18/22  0438 04/17/22  0251 04/16/22  0435   ALT 24 24 26   * 116 110   TBILI 0.9 1.1* 0.9   TP 5.8* 5.7* 5.7*   ALB 2.6* 2.6* 2.7*   GLOB 3.2 3.1 3.0       Recent Labs     04/18/22  0438 04/17/22  0251 04/16/22  0435   LAC 0.9 0.7 0.8       Recent Labs     04/18/22  0439 04/17/22  0251   WBC 9.6 9.3   HGB 8.1* 7.7*   HCT 25.1* 23.9*    205       Prealbumin   Date Value Ref Range Status   05/21/2021 22.3 20.0 - 40.0 mg/dL Final       Triglyceride   Date Value Ref Range Status   03/01/2022 69 <150 MG/DL Final     Comment:     Based on NCEP-ATP III:  Triglycerides <150 mg/dL  is considered normal, 150-199 mg/dL  borderline high,  200-499 mg/dL high and  greater than or equal to 500 mg/dL very high.        Recent Labs     04/18/22  1204 04/18/22  0641 04/17/22  2119 04/17/22  1726 04/17/22  1149 04/17/22  0847 04/16/22  2127 04/16/22  1801 04/16/22  1159 04/16/22  0859 04/16/22  0039 04/15/22  1645   GLUCPOC 192* 144* 195* 147* 236* 121* 235* 157* 266* 116 179* 162*       Lab Results   Component Value Date/Time    Hemoglobin A1c 6.8 (H) 04/04/2022 03:38 AM    Hemoglobin A1c 6.7 (H) 03/02/2022 03:43 AM    Hemoglobin A1c 6.8 (H) 01/11/2022 11:34 AM       Iron Profile  Iron   Date Value Ref Range Status   04/08/2022 13 (L) 35 - 150 ug/dL Final   04/03/2022 41 35 - 150 ug/dL Final   03/02/2022 32 (L) 35 - 150 ug/dL Final   02/02/2022 23 (L) 35 - 150 ug/dL Final   05/20/2021 32 (L) 35 - 150 ug/dL Final     TIBC   Date Value Ref Range Status   04/08/2022 153 (L) 250 - 450 ug/dL Final   04/03/2022 240 (L) 250 - 450 ug/dL Final   03/02/2022 294 250 - 450 ug/dL Final   02/02/2022 328 250 - 450 ug/dL Final   05/20/2021 420 250 - 450 ug/dL Final     Iron % saturation   Date Value Ref Range Status   04/08/2022 8 (L) 20 - 50 % Final   04/03/2022 17 (L) 20 - 50 % Final   03/02/2022 11 (L) 20 - 50 % Final   02/02/2022 7 (L) 20 - 50 % Final   05/20/2021 8 (L) 20 - 50 % Final       Ferritin   Date Value Ref Range Status   04/10/2022 963 (H) 26 - 388 NG/ML Final   04/08/2022 812 (H) 26 - 388 NG/ML Final 04/03/2022 172 26 - 388 NG/ML Final       Folate   Date Value Ref Range Status   04/09/2022 9.8 5.0 - 21.0 ng/mL Final   05/23/2021 15.7 5.0 - 21.0 ng/mL Final     Vitamin B12   Date Value Ref Range Status   04/09/2022 755 193 - 986 pg/mL Final   05/23/2021 605 193 - 986 pg/mL Final         Vitamin D 25-Hydroxy   Date Value Ref Range Status   02/02/2022 129.3 (H) 30 - 100 ng/mL Final     Comment:     (NOTE)  Deficiency               <20 ng/mL  Insufficiency          20-30 ng/mL  Sufficient             ng/mL  Possible toxicity       >100 ng/mL    The Method used is Siemens Advia Centaur currently standardized to a   Center of Disease Control and Prevention (CDC) certified reference   [address] [place] [address]. Samples containing fluorescein dye can produce falsely   elevated values when tested with the ADVIA Centaur Vitamin D Assay. It is recommended that results in the toxic range, >100 ng/mL, be   retested 72 hours post fluorescein exposure.            Alicia Bridges RD  Available via [employer]

## 2022-04-18 NOTE — PROGRESS NOTES
0800: Bedside and Verbal shift change report given to Swetha BRADLEY (oncoming nurse) by Demetria Chavez (offgoing nurse). Report included the following information SBAR and Cardiac Rhythm A Fib.     1000: Pt rhythm converted to NSR w/ 1st degree AVB w/ freq PACs    1400: Driveline dressing changed w/ E Terry NP at bedside    1530: Bedside and Verbal shift change report given to Mariia Lopez RN (oncoming nurse) by Phoebe Sanchez (offgoing nurse). Report included the following information SBAR and Cardiac Rhythm NSR w/ 1st degree AVB & frequent PACs.

## 2022-04-18 NOTE — PROGRESS NOTES
[address]  Inpatient Progress Note      Patient name: Juan Manuel Rea  Patient : 1945  Patient MRN: [id number]  Consulting MD: John Chiu MD  Date of service: 22      REASON FOR REFERRAL:  Management of LVAD      ASSESSMENT:   · 69 y.o. 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     · Now POD 13 s/p LVAD implant with HM3 as DT due to age (22)   Post-op course complicated by RV failure requiring prolonged inotropic support with dobutamine, congestive hepatopathy, superficial soft tissue infection at sternotomy incision requiring resumption of antibiotics      PLAN:  POD#13 from LVAD implant  TTE shows severe TR but improved RV function; continue to optimize medically   Repeat TTE today to eval RV and TV  Continue speed 4800rpms, low speed 4400  Continue dobutamine 6 mcg/kg/min, keep this dose until repeat TTE  Continue hydralazine 50 mg PO q8h   Continue decreased amiodarone to 200 mg PO twice daily  Continue losartan 12.5 mg PO daily   Diuretics PRN CVP > 14 mmHg (monitor via PICC)   Monitor MAP via doppler  Extended course of cefepime q12h for soft tissue infection at sternotomy site; examined by Dr. Jaimee Mauro; now continuing pending culture of driveline exit site  Driveline dressing changes daily for now until 5/3/22  Monitor drive line drainage   Wound culture pending   NANDINI hose    Continue warfarin, goal INR 2-3; 6mg tonight   ASA 81mg daily  Continue colchicine for pericarditis   Continue gabapentin 200 mg PO TID   Toradol q6h PRN and before PT/OT; OK with Dr. Jaimee Mauro, renal function stable  IV fentanyl prn (pt with hx anaphylaxis to oxycodone)  Continue bowel regimen as needed   Pulmonary toilet  Monitor CT on water seal today, will d/w Dr. Jaimee Mauro re removal   CPAP QHS  Advanced care plan forms on file   Strict I/O, daily weights, Na+ restricted diet   Continue VAD education   Equipment ordered        SUBJECTIVE  BRIEF HISTORY OF PRESENT ILLNESS:  Kavitha Khoury is a 69 y.o. male with h/o HTN, HL, DM2, SRUTHI on CPAP, chronic systolic heart failure, stage D, NYHA class IV symptoms, non-ischemic cardiomyopathy, LVEF 20% with LVEDD 6.2, RV dysfunciton, TAPSE 1.22, TBili 1.5 likely from cardiac congestion, recent cardiac arrest s/p ICD (1/2013, [employer] followed by [facility]), coronary artery disease s/p multiple interventions s/p 4V CABG (8/2012), LHC (7/2019) severe stenosis of LIMA to LAD anastomosis site, SVG graft to OM is occluded, SVG graft to RCA 40-50%, LAD occluded proximally, severe proximal LCx, RCA is the long . PET (6/2019) EF 24% with anterior lateral and inferior reversible defect. Anemia, microcytic with iron deficiency, DVT with filter.     Patient was referred to [facility] by Dr. Cyndi Mo in 2021 for evaluation of his candidacy for advanced therapies.     Patient agreed to inpatient initiation of palliative infusion of chronic inotropes and evaluation for LVAD-DT. Patient was admitted 5/2-5/25/21. Patient was started on milrinone infusion and had first part of LVAD evaluation completed. Right and left heart cath on 5/24/21 showed severe diffuse native vessel CAD, with patent grafts (LIMA to D2, SVG to RCA).  Pt was found to have pulmonary nodule during workup. Antiplatelet therapy was held during hospitalization and after discharge due to anticipated pulmonary nodule biopsy, bone marrow biopsy and EGD/C-Scope as part of LVAD workup. Pt found to have adenocarcinoma of the lung, and so LVAD was deferred pending treatment. Patient completed radiation treatment for adenocarcinoma of the lung.  Hem-onc cleared patient for VAD implant with 90% 2 year prognosis.      He was most recently admitted for elective pre-op EGD and colonoscopy which he tolerated well; path negative for malignancy.      He presented to [facility] on 4/3 for admission for planned LVAD implant. INTERVAL HISTORY:  Underwent LVAD implant on 4/5/22. Was re-do sternotomy, bleeding intra-op, required cryo, k-centra, FFP, Plt, and cellsaver in OR. Had RV dysfunction noted intra-op; requiring lower VAD speed and dual inotrope support. Inotropic support was able to be decreased, but pt now remains on dobutamine at 6mcg/kg/min. Pt ans wife have been doing well with LVAD education. Pt has been OOB, is eating well and moving his bowels. Therapeutic on warfarin. LAST 24 HOURS:  No acute events overnight  Net even  VSS and LVAD flows stable- no acute alarms on VAD  CT output 110mL last 24 hrs  Some abdominal pain today   Creatinine stable 0.89  T Bili stable at 0.9  LA 0.9, PBNP 3100      REVIEW OF SYSTEMS:  Review of Systems   Constitutional: Negative for chills, fever, malaise/fatigue and weight loss. Respiratory: Negative for cough and shortness of breath. Cardiovascular: Negative for chest pain, palpitations, orthopnea and leg swelling. Gastrointestinal: Negative for abdominal pain, nausea and vomiting. Neurological: Negative for dizziness and weakness. Psychiatric/Behavioral: Negative. LIFE GOALS:  Patient's personal goals include: getting stronger and going home soon  Important upcoming milestones or family events:    The patient identifies the following as important for living well: being independent, being at home, enjoying family time        OBJECTIVE:  PHYSICAL EXAM:  Visit Vitals  BP 99/81 (BP 1 Location: Left upper arm, BP Patient Position: At rest)   Pulse 74   Temp 98.5 °F (36.9 °C)   Resp 21   Ht 6' 1\" (1.854 m)   Wt 199 lb 8 oz (90.5 kg)   SpO2 98%   BMI 26.32 kg/m²       LVAD INTERROGATION:  Device interrogated in person  Device function normal, normal flow, no events  LVAD   Pump Speed (RPM): 4800  Pump Flow (LPM): 3  MAP: 78 (L radial doppler)  PI (Pulsitility Index): 8.7  Power: 3.1   Test: Yes  Back Up  at Bedside & Labeled: Yes  Power Module Test: Yes  Driveline Site Care: No  Driveline Dressing: Clean, Dry, and Intact  MAP in Therapeutic Range (Outpatient): Yes  Testing  Alarms Reviewed: Yes  Back up SC speed: 4800  Back up Low Speed Limit: 4400  Emergency Equipment with Patient?: Yes  Emergency procedures reviewed?: Yes  Drive line site inspected?: No  Drive line intergrity inspected?: Yes  Drive line dressing changed?: No      Physical Exam  Vitals reviewed. Constitutional:       General: He is not in acute distress. Appearance: Normal appearance. Cardiovascular:      Rate and Rhythm: Normal rate and regular rhythm. Comments: LVAD Hum noted on auscultation  Pulmonary:      Effort: Pulmonary effort is normal. No respiratory distress. Abdominal:      General: Bowel sounds are normal. There is no distension. Palpations: Abdomen is soft. Tenderness: There is no abdominal tenderness. Musculoskeletal:      Right lower le+ Pitting Edema present. Left lower le+ Pitting Edema present. Skin:     General: Skin is warm and dry. Capillary Refill: Capillary refill takes less than 2 seconds. Neurological:      General: No focal deficit present. Mental Status: He is alert and oriented to person, place, and time. Psychiatric:         Mood and Affect: Mood normal.         Behavior: Behavior normal.         Thought Content: Thought content normal.         Judgment: Judgment normal.            CARDIAC IMAGING AND DIAGNOSTICS REVIEWED:  Echo 22- ordered   Echo (22)    Left Ventricle: Left ventricle size is normal. Moderately increased wall thickness. Findings consistent with concentric remodeling. Severe global hypokinesis present. Severely reduced left ventricular systolic function with a visually estimated EF of 10 -15%.  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   Aortic Valve: Moderate sclerosis of the aortic valve cusp.   Mitral Valve: Moderately thickened leaflet. Mild transvalvular regurgitation.   Tricuspid Valve: Severe transvalvular regurgitation.   Right Atrium: Right atrium is severely dilated.     Echo (3/2/22)   · LV 10-15%  · Mod-severe MR      Echo (11/23/21):  · LV 15-20%.    · Mod-severe, MR, mod-TR     Echo (5/20/21)  · LV: Estimated LVEF is 20 - 25%. Normal wall thickness. Mildly dilated left ventricle. Severely and globally reduced systolic function. Severe (grade 4) left ventricular diastolic dysfunction. · LA: Severely dilated left atrium. · RA: Severely dilated right atrium. · MV: Moderate mitral valve regurgitation is present. · TV: Moderate tricuspid valve regurgitation is present. · AO: Mild aortic root dilatation. · RV: Pacer/ICD present. · LVED 6.2cm     EKG (5/20/21) SB with 1degree AV block, QRS 116ms     LHC (5/24/21) Native Coronaries: LM - moderate to severe distal disease 50%. % prox occluded (), heavily calcified, LCx: 80% proximal stenosis. OM1 is  (seen filling faintly via collaterals). OM2 99% stenosis. RCA: 100% proximal occluded.  LIMA to LAD: patent, supplies only a diagonal branch (probably D2).  The LAD distal to the bifurcation is 100% occluded () and fills via right to left collaterals off the SVG to RCA. SVG to R-PDA: patent with moderate diffuse irregularities but no obstructive disease in the graft.    No appealing interventional targets.      NST as above     ICD Interrogation (11/12/2021) [employer] VVI, thoracic impedence trending up, no events, normal device function and good battery  ICD interrogation (5/12/21) [employer] single lead, no events, normal device function and good battery     HEMODYNAMICS:  RHC 5/24/21 CI 1.97, PA 33/9/17, RA 1, PCWP 6- off milrinone   CPEST not done     Patient underwent a 6 minute walk test 11/12/2021     6 Min Walk Report 11/12/2021 7/6/2021 5/20/2021   (PRE) HR 88 98 74   (PRE) O2 Sat 100 - 99   (POST)  - 81   (POST) O2 Sat 99 98% on Ra 99   Distance in Meters 386.58 - 1158. 24       OTHER IMAGING:  CXR (6/17/21) clear  CT 5/21/21 1. Irregular pulmonary nodule in the left upper lobe measuring 2 cm, suspicious for primary pulmonary malignancy. 2. Additional 6 mm left upper lobe pulmonary nodule. 3. Severe calcific atherosclerosis of the coronary arteries and abdominal aorta. No aneurysm. 4. Cardiomegaly. 5. No acute abnormality within the chest, abdomen, or pelvis. LABORATORY RESULTS:     Labs Latest Ref Rng & Units 4/18/2022 4/17/2022 4/16/2022 4/15/2022 4/14/2022 4/14/2022 4/13/2022   WBC 4.1 - 11.1 K/uL 9.6 9.3 8.8 9.2 - 7.7 8.4   RBC 4.10 - 5.70 M/uL 3.10(L) 2.97(L) 2.94(L) 3.17(L) - 2.93(L) 3.33(L)   Hemoglobin 12.1 - 17.0 g/dL 8. 1(L) 7. 7(L) 7. 7(L) 8.2(L) 8.2(L) 7. 7(L) 8.7(L)   Hematocrit 36.6 - 50.3 % 25. 1(L) 23. 9(L) 23. 6(L) 25. 5(L) 25. 5(L) 23. 7(L) 26. 9(L)   MCV 80.0 - 99.0 FL 81.0 80.5 80.3 80.4 - 80.9 80.8   Platelets 262 - 681 K/uL 243 205 203 208 - 177 178   Lymphocytes 12 - 49 % - - - - - - -   Monocytes 5 - 13 % - - - - - - -   Eosinophils 0 - 7 % - - - - - - -   Basophils 0 - 1 % - - - - - - -   Bands 0 - 6 % - - - - - - -   Albumin 3.5 - 5.0 g/dL 2. 6(L) 2. 6(L) 2. 7(L) 2. 9(L) - 2. 8(L) 2. 9(L)   Calcium 8.5 - 10.1 MG/DL 8. 3(L) 8.4(L) 8.4(L) 8. 3(L) - 8. 4(L) 8.7   Glucose 65 - 100 mg/dL 153(H) 174(H) 118(H) 141(H) - 115(H) 135(H)   BUN 6 - 20 MG/DL 26(H) 23(H) 28(H) 26(H) - 23(H) 22(H)   Creatinine 0.70 - 1.30 MG/DL 0.89 0.84 0.85 0.87 - 0.83 0.96   Sodium 136 - 145 mmol/L 127(L) 128(L) 128(L) 129(L) - 130(L) 131(L)   Potassium 3.5 - 5.1 mmol/L 5.0 4.8 4.8 4.6 - 4.0 4.4   TSH 0.36 - 3.74 uIU/mL - - - - - - -   LDH 85 - 241 U/L 192 196 201 192 - 185 248(H)   Some recent data might be hidden     Lab Results   Component Value Date/Time    TSH 2.26 04/10/2022 03:00 AM    TSH 1.68 03/01/2022 11:50 AM    TSH 1.47 05/26/2021 10:44 PM    TSH 1.97 05/20/2021 04:28 PM    TSH 1.41 02/09/2021 03:05 PM    TSH 1.740 08/14/2018 09:58 AM    TSH 1.710 10/18/2017 12:00 AM         HISTORY:  PAST MEDICAL HISTORY:  Past Medical History:   Diagnosis Date    CAD (coronary artery disease) '90  '97, '13 x 2    MI, code ice in 2013 [facility]    Calculus of kidney     Colonic polyps     Congestive heart failure, unspecified     Diabetes ([facility] [address]     Gastritis     Hypercholesterolemia     Sleep apnea        PAST SURGICAL HISTORY:  Past Surgical History:   Procedure Laterality Date    COLONOSCOPY  04/06/2011    16, due 21    COLONOSCOPY N/A 3/2/2022    COLONOSCOPY    :- performed by Shazia Monroy MD at [address] [facility], COLON, DIAGNOSTIC      11, due 16    HX CORONARY ARTERY BYPASS GRAFT  8/22/12    x 4 vessel by MISSY Ramirez    HX HEENT      LASIK    HX PACEMAKER PLACEMENT  1/30/13    [place] CARDIAC SURG PROCEDURE UNLIST  2012    x 4 vessels       FAMILY HISTORY:  Family History   Problem Relation Age of Onset    Heart Disease Mother         MI    Heart Disease Father         CAD & PVD    Lung Disease Father     Cancer Father         lung    Diabetes Maternal Grandmother     Heart Disease Other         CAD    Stroke Sister        SOCIAL HISTORY:  Social History     Socioeconomic History    Marital status:    Tobacco Use    Smoking status: Never Smoker    Smokeless tobacco: Never Used   Vaping Use    Vaping Use: Never used   Substance and Sexual Activity    Alcohol use:  Yes     Alcohol/week: 0.0 standard drinks     Comment:  VERY RARE    Drug use: No       ALLERGY:  Allergies   Allergen Reactions    Oxycodone Anaphylaxis    Pcn [Penicillins] Hives        CURRENT MEDICATIONS:    Current Facility-Administered Medications:     cefepime (MAXIPIME) 2 g in 0.9% sodium chloride (MBP/ADV) 100 mL MBP, 2 g, IntraVENous, Q12H, Aime JACOBSON NP    warfarin (COUMADIN) tablet 6 mg, 6 mg, Oral, ONCE, Terry, scanRO Boxbe B, NP    polyethylene glycol (MIRALAX) packet 17 g, 17 g, Oral, DAILY, Bettey Stare B, NP, 17 g at 04/18/22 0816    senna-docusate (PERICOLACE) 8.6-50 mg per tablet 1 Tablet, 1 Tablet, Oral, DAILY PRN, Maddie Pedroza NP    [Held by provider] potassium chloride SR (KLOR-CON 10) tablet 10 mEq, 10 mEq, Oral, DAILY, Bettey Stare B, NP, 10 mEq at 04/17/22 0859    oxymetazoline (AFRIN) 0.05 % nasal spray 2 Spray, 2 Spray, Both Nostrils, BID PRN, Toshia Stare B, NP    hydrALAZINE (APRESOLINE) tablet 50 mg, 50 mg, Oral, Q8H, Marvin Millie T, NP, 50 mg at 04/18/22 1116    losartan (COZAAR) tablet 12.5 mg, 12.5 mg, Oral, DAILY, Polljuan migueld, Jannice Chill T, NP, 12.5 mg at 04/18/22 5694    amiodarone (CORDARONE) tablet 200 mg, 200 mg, Oral, BID, Polliard, Jannice Chill T, NP, 200 mg at 04/18/22 0816    colchicine tablet 0.6 mg, 0.6 mg, Oral, DAILY, Polliard, Jannice Chill T, NP, 0.6 mg at 04/18/22 0816    alteplase (CATHFLO) 1 mg in sterile water (preservative free) 1 mL injection, 1 mg, InterCATHeter, PRN, Riana Moore MD, 1 mg at 04/16/22 1149    bisacodyL (DULCOLAX) suppository 10 mg, 10 mg, Rectal, DAILY PRN, Augustine Otoole MD, 10 mg at 04/12/22 1533    gabapentin (NEURONTIN) capsule 200 mg, 200 mg, Oral, TID, Polliard, Jannice Chill T, NP, 200 mg at 04/18/22 0816    albumin human 25% (BUMINATE) solution 12.5 g, 12.5 g, IntraVENous, BID, Polliard, Jannice Chill T, NP, 12.5 g at 04/18/22 0816    famotidine (PEPCID) tablet 20 mg, 20 mg, Oral, Q12H, Augustine Otoole MD, 20 mg at 04/18/22 0816    bumetanide (BUMEX) injection 1 mg, 1 mg, IntraVENous, Q6H PRN, Augustine Otoole MD, 1 mg at 04/10/22 1234    mirtazapine (REMERON) tablet 7.5 mg, 7.5 mg, Oral, QHS, Zeus Stewartin B, NP, 7.5 mg at 04/17/22 2116    lidocaine 4 % patch 1 Patch, 1 Patch, TransDERmal, Q24H, Danica Stewart, NP, 1 Patch at 04/17/22 1655    Warfarin NP/MD dosing, , Other, PRN, Terry, Danica B, NP    melatonin tablet 3 mg, 3 mg, Oral, QHS PRN, Ryanne Sanchez MD, 3 mg at 04/10/22 0251    hydrALAZINE (APRESOLINE) 20 mg/mL injection 10 mg, 10 mg, IntraVENous, Q6H PRN, Ole Interiano B, NP, 10 mg at 04/10/22 0245    aspirin chewable tablet 81 mg, 81 mg, Oral, DAILY, Ole Interiano B, NP, 81 mg at 22 0816    hydrALAZINE (APRESOLINE) 20 mg/mL injection 5 mg, 5 mg, IntraVENous, Q6H PRN, Ole Interiano B, NP, 5 mg at 22 5557    sodium chloride (NS) flush 5-40 mL, 5-40 mL, IntraVENous, Q8H, Lucyiard, Tracy  T, NP, 10 mL at 22 6017    sodium chloride (NS) flush 5-40 mL, 5-40 mL, IntraVENous, PRN, Larissa Wong, NP    0.9% sodium chloride infusion, 9 mL/hr, IntraVENous, CONTINUOUS, Larissa Wong NP, Last Rate: 3 mL/hr at 22 0755, 3 mL/hr at 22 0755    acetaminophen (TYLENOL) tablet 650 mg, 650 mg, Oral, Q6H PRN, Larissa Wong NP, 650 mg at 22 0834    naloxone (NARCAN) injection 0.4 mg, 0.4 mg, IntraVENous, PRN, Larissa Wong NP    [facility]ron [facility]) injection 4 mg, 4 mg, IntraVENous, Q4H PRN, Shaquille Wonga  T, NP, 4 mg at 22 2139    albuterol-ipratropium (DUO-NEB) 2.5 MG-0.5 MG/3 ML, 3 mL, Nebulization, Q6H PRN, Larissa Wong, NP    midazolam (VERSED) injection 1 mg, 1 mg, IntraVENous, Q1H PRN, LucyiarLarissa reynoso, NP    ELECTROLYTE REPLACEMENT NOTE: Nurse to review Serum Potassium and Magnesuim levels and Initiate Electrolyte Replacement Protocol as needed, 1 Each, Other, PRN, Polliard, Larissa Walter, NP    insulin regular (NOVOLIN R, HUMULIN R) 100 Units in 0.9% sodium chloride 100 mL infusion, 0-50 Units/hr, IntraVENous, TITRATE, Larissa Wong NP, Stopped at 22 1619    glucose chewable tablet 16 g, 4 Tablet, Oral, PRN, Larissa Wong NP    glucagon (GLUCAGEN) injection 1 mg, 1 mg, IntraMUSCular, PRN, Larissa Wong NP    insulin lispro (HUMALOG) injection, , SubCUTAneous, AC&HS, Larissa Wong NP, 2 Units at 22 1224    dextrose 10 % infusion 0-250 mL, 0-250 mL, IntraVENous, PRN, Polliard, Quinton Dottie, NP, Last Rate: 999 mL/hr at 04/07/22 2145, 45 mL at 04/07/22 2145    sucralfate (CARAFATE) tablet 1 g, 1 g, Oral, AC&HS, Polliard, Quinton Dottie, NP, 1 g at 04/18/22 1116    0.45% sodium chloride infusion, 10 mL/hr, IntraVENous, CONTINUOUS, Tawanna Najera MD, Stopped at 04/10/22 0730    fentaNYL citrate (PF) injection 25 mcg, 25 mcg, IntraVENous, Q2H PRN, Polliard, Quintonsteven Sinclair, NP, 25 mcg at 04/18/22 1048    DOBUTamine (DOBUTREX) 1,000 mg/250 mL (4,000 mcg/mL) infusion, 0-10 mcg/kg/min, IntraVENous, TITRATE, Polljuan migueld, Quintonsteven Sinclair, NP, Last Rate: 6.8 mL/hr at 04/18/22 0755, 6 mcg/kg/min at 04/18/22 0755    PATIENT CARE TEAM:  Patient Care Team:  Alvenia Apley, MD as PCP - General (Internal Medicine)  Alvenia Apley, MD as PCP - [address] [place] [address] Provider  Galileo Lowe MD as Physician (Cardiology)  Stella Dangelo MD as Physician (Gastroenterology)  Mark Chavez MD as Physician (Orthopedic Surgery)  Wade Mccord MD as Physician (Ophthalmology)  Ryan Swain MD (Cardiology)  Alicia Wesley MD (Cardiology)         Thank you for allowing us to participate in this patient's care.       Raiza Jameson NP   [address] [place] [address], [address]  Phone: [phone]

## 2022-04-19 ENCOUNTER — HOME HEALTH ADMISSION (OUTPATIENT)
Dept: HOME HEALTH SERVICES | Facility: HOME HEALTH | Age: 77
End: 2022-04-19
Payer: MEDICARE

## 2022-04-19 ENCOUNTER — APPOINTMENT (OUTPATIENT)
Dept: GENERAL RADIOLOGY | Age: 77
DRG: 001 | End: 2022-04-19
Attending: NURSE PRACTITIONER
Payer: MEDICARE

## 2022-04-19 ENCOUNTER — APPOINTMENT (OUTPATIENT)
Dept: NON INVASIVE DIAGNOSTICS | Age: 77
DRG: 001 | End: 2022-04-19
Attending: NURSE PRACTITIONER
Payer: MEDICARE

## 2022-04-19 LAB
ALBUMIN SERPL-MCNC: 2.6 G/DL (ref 3.5–5)
ALBUMIN/GLOB SERPL: 0.8 {RATIO} (ref 1.1–2.2)
ALP SERPL-CCNC: 119 U/L (ref 45–117)
ALT SERPL-CCNC: 21 U/L (ref 12–78)
ANION GAP SERPL CALC-SCNC: 6 MMOL/L (ref 5–15)
APTT PPP: 36.1 SEC (ref 22.1–31)
APTT PPP: 39.2 SEC (ref 22.1–31)
APTT PPP: 41.4 SEC (ref 22.1–31)
AST SERPL-CCNC: 52 U/L (ref 15–37)
B PERT DNA SPEC QL NAA+PROBE: NOT DETECTED
BACTERIA SPEC CULT: NORMAL
BILIRUB SERPL-MCNC: 1.1 MG/DL (ref 0.2–1)
BNP SERPL-MCNC: 4043 PG/ML
BORDETELLA PARAPERTUSSIS PCR, BORPAR: NOT DETECTED
BUN SERPL-MCNC: 18 MG/DL (ref 6–20)
BUN/CREAT SERPL: 23 (ref 12–20)
C PNEUM DNA SPEC QL NAA+PROBE: NOT DETECTED
CALCIUM SERPL-MCNC: 8 MG/DL (ref 8.5–10.1)
CHLORIDE SERPL-SCNC: 101 MMOL/L (ref 97–108)
CO2 SERPL-SCNC: 24 MMOL/L (ref 21–32)
CREAT SERPL-MCNC: 0.8 MG/DL (ref 0.7–1.3)
ECHO EST RA PRESSURE: 3 MMHG
ECHO RIGHT VENTRICULAR SYSTOLIC PRESSURE (RVSP): 31 MMHG
ECHO RV INTERNAL DIMENSION: 6 CM
ECHO RV TAPSE: 0.5 CM (ref 1.7–?)
ECHO TV REGURGITANT MAX VELOCITY: 2.63 M/S
ECHO TV REGURGITANT PEAK GRADIENT: 28 MMHG
ERYTHROCYTE [DISTWIDTH] IN BLOOD BY AUTOMATED COUNT: 22.4 % (ref 11.5–14.5)
ERYTHROCYTE [SEDIMENTATION RATE] IN BLOOD: 35 MM/HR (ref 0–20)
FLUAV H1 2009 PAND RNA SPEC QL NAA+PROBE: NOT DETECTED
FLUAV H1 RNA SPEC QL NAA+PROBE: NOT DETECTED
FLUAV H3 RNA SPEC QL NAA+PROBE: NOT DETECTED
FLUAV SUBTYP SPEC NAA+PROBE: NOT DETECTED
FLUBV RNA SPEC QL NAA+PROBE: NOT DETECTED
GLOBULIN SER CALC-MCNC: 3.3 G/DL (ref 2–4)
GLUCOSE BLD STRIP.AUTO-MCNC: 160 MG/DL (ref 65–117)
GLUCOSE BLD STRIP.AUTO-MCNC: 201 MG/DL (ref 65–117)
GLUCOSE BLD STRIP.AUTO-MCNC: 216 MG/DL (ref 65–117)
GLUCOSE BLD STRIP.AUTO-MCNC: 220 MG/DL (ref 65–117)
GLUCOSE SERPL-MCNC: 191 MG/DL (ref 65–100)
GRAM STN SPEC: NORMAL
GRAM STN SPEC: NORMAL
HADV DNA SPEC QL NAA+PROBE: NOT DETECTED
HCOV 229E RNA SPEC QL NAA+PROBE: NOT DETECTED
HCOV HKU1 RNA SPEC QL NAA+PROBE: NOT DETECTED
HCOV NL63 RNA SPEC QL NAA+PROBE: NOT DETECTED
HCOV OC43 RNA SPEC QL NAA+PROBE: NOT DETECTED
HCT VFR BLD AUTO: 25.3 % (ref 36.6–50.3)
HGB BLD-MCNC: 8 G/DL (ref 12.1–17)
HMPV RNA SPEC QL NAA+PROBE: NOT DETECTED
HPIV1 RNA SPEC QL NAA+PROBE: NOT DETECTED
HPIV2 RNA SPEC QL NAA+PROBE: NOT DETECTED
HPIV3 RNA SPEC QL NAA+PROBE: NOT DETECTED
HPIV4 RNA SPEC QL NAA+PROBE: NOT DETECTED
INR PPP: 1.8 (ref 0.9–1.1)
LDH SERPL L TO P-CCNC: 206 U/L (ref 85–241)
M PNEUMO DNA SPEC QL NAA+PROBE: NOT DETECTED
MAGNESIUM SERPL-MCNC: 2.2 MG/DL (ref 1.6–2.4)
MCH RBC QN AUTO: 25.9 PG (ref 26–34)
MCHC RBC AUTO-ENTMCNC: 31.6 G/DL (ref 30–36.5)
MCV RBC AUTO: 81.9 FL (ref 80–99)
NRBC # BLD: 0 K/UL (ref 0–0.01)
NRBC BLD-RTO: 0 PER 100 WBC
PLATELET # BLD AUTO: 240 K/UL (ref 150–400)
PMV BLD AUTO: 9.4 FL (ref 8.9–12.9)
POTASSIUM SERPL-SCNC: 4.4 MMOL/L (ref 3.5–5.1)
PROT SERPL-MCNC: 5.9 G/DL (ref 6.4–8.2)
PROTHROMBIN TIME: 18.2 SEC (ref 9–11.1)
RBC # BLD AUTO: 3.09 M/UL (ref 4.1–5.7)
RSV RNA SPEC QL NAA+PROBE: NOT DETECTED
RV+EV RNA SPEC QL NAA+PROBE: NOT DETECTED
SARS-COV-2 PCR, COVPCR: NOT DETECTED
SERVICE CMNT-IMP: ABNORMAL
SERVICE CMNT-IMP: NORMAL
SODIUM SERPL-SCNC: 131 MMOL/L (ref 136–145)
THERAPEUTIC RANGE,PTTT: ABNORMAL SECS (ref 58–77)
WBC # BLD AUTO: 9 K/UL (ref 4.1–11.1)

## 2022-04-19 PROCEDURE — 74011250637 HC RX REV CODE- 250/637: Performed by: INTERNAL MEDICINE

## 2022-04-19 PROCEDURE — 74011250636 HC RX REV CODE- 250/636: Performed by: NURSE PRACTITIONER

## 2022-04-19 PROCEDURE — 80053 COMPREHEN METABOLIC PANEL: CPT

## 2022-04-19 PROCEDURE — 97110 THERAPEUTIC EXERCISES: CPT

## 2022-04-19 PROCEDURE — 93308 TTE F-UP OR LMTD: CPT

## 2022-04-19 PROCEDURE — 83615 LACTATE (LD) (LDH) ENZYME: CPT

## 2022-04-19 PROCEDURE — 85652 RBC SED RATE AUTOMATED: CPT

## 2022-04-19 PROCEDURE — 0202U NFCT DS 22 TRGT SARS-COV-2: CPT

## 2022-04-19 PROCEDURE — 74011636637 HC RX REV CODE- 636/637: Performed by: NURSE PRACTITIONER

## 2022-04-19 PROCEDURE — 85610 PROTHROMBIN TIME: CPT

## 2022-04-19 PROCEDURE — 82962 GLUCOSE BLOOD TEST: CPT

## 2022-04-19 PROCEDURE — 97535 SELF CARE MNGMENT TRAINING: CPT

## 2022-04-19 PROCEDURE — 74011000250 HC RX REV CODE- 250: Performed by: NURSE PRACTITIONER

## 2022-04-19 PROCEDURE — P9047 ALBUMIN (HUMAN), 25%, 50ML: HCPCS | Performed by: NURSE PRACTITIONER

## 2022-04-19 PROCEDURE — 74011000258 HC RX REV CODE- 258: Performed by: NURSE PRACTITIONER

## 2022-04-19 PROCEDURE — 74011250637 HC RX REV CODE- 250/637: Performed by: STUDENT IN AN ORGANIZED HEALTH CARE EDUCATION/TRAINING PROGRAM

## 2022-04-19 PROCEDURE — 85730 THROMBOPLASTIN TIME PARTIAL: CPT

## 2022-04-19 PROCEDURE — 93750 INTERROGATION VAD IN PERSON: CPT | Performed by: THORACIC SURGERY (CARDIOTHORACIC VASCULAR SURGERY)

## 2022-04-19 PROCEDURE — 83880 ASSAY OF NATRIURETIC PEPTIDE: CPT

## 2022-04-19 PROCEDURE — 97530 THERAPEUTIC ACTIVITIES: CPT

## 2022-04-19 PROCEDURE — 93750 INTERROGATION VAD IN PERSON: CPT | Performed by: NURSE PRACTITIONER

## 2022-04-19 PROCEDURE — 85027 COMPLETE CBC AUTOMATED: CPT

## 2022-04-19 PROCEDURE — 93750 INTERROGATION VAD IN PERSON: CPT | Performed by: INTERNAL MEDICINE

## 2022-04-19 PROCEDURE — 97116 GAIT TRAINING THERAPY: CPT

## 2022-04-19 PROCEDURE — 99233 SBSQ HOSP IP/OBS HIGH 50: CPT | Performed by: INTERNAL MEDICINE

## 2022-04-19 PROCEDURE — 74011250637 HC RX REV CODE- 250/637: Performed by: NURSE PRACTITIONER

## 2022-04-19 PROCEDURE — 71045 X-RAY EXAM CHEST 1 VIEW: CPT

## 2022-04-19 PROCEDURE — 36415 COLL VENOUS BLD VENIPUNCTURE: CPT

## 2022-04-19 PROCEDURE — 83735 ASSAY OF MAGNESIUM: CPT

## 2022-04-19 PROCEDURE — 65610000003 HC RM ICU SURGICAL

## 2022-04-19 PROCEDURE — 99233 SBSQ HOSP IP/OBS HIGH 50: CPT | Performed by: NURSE PRACTITIONER

## 2022-04-19 PROCEDURE — 99291 CRITICAL CARE FIRST HOUR: CPT | Performed by: THORACIC SURGERY (CARDIOTHORACIC VASCULAR SURGERY)

## 2022-04-19 RX ORDER — BUMETANIDE 0.25 MG/ML
2 INJECTION INTRAMUSCULAR; INTRAVENOUS 2 TIMES DAILY
Status: DISCONTINUED | OUTPATIENT
Start: 2022-04-19 | End: 2022-04-26

## 2022-04-19 RX ORDER — LOSARTAN POTASSIUM 25 MG/1
25 TABLET ORAL DAILY
Status: DISCONTINUED | OUTPATIENT
Start: 2022-04-20 | End: 2022-04-26

## 2022-04-19 RX ORDER — HEPARIN SODIUM 1000 [USP'U]/ML
2000 INJECTION, SOLUTION INTRAVENOUS; SUBCUTANEOUS ONCE
Status: COMPLETED | OUTPATIENT
Start: 2022-04-20 | End: 2022-04-20

## 2022-04-19 RX ORDER — WARFARIN SODIUM 5 MG/1
10 TABLET ORAL ONCE
Status: COMPLETED | OUTPATIENT
Start: 2022-04-19 | End: 2022-04-19

## 2022-04-19 RX ORDER — GUAIFENESIN 100 MG/5ML
100 SOLUTION ORAL
Status: DISCONTINUED | OUTPATIENT
Start: 2022-04-19 | End: 2022-05-06 | Stop reason: HOSPADM

## 2022-04-19 RX ORDER — BUMETANIDE 0.25 MG/ML
1 INJECTION INTRAMUSCULAR; INTRAVENOUS 2 TIMES DAILY
Status: DISCONTINUED | OUTPATIENT
Start: 2022-04-19 | End: 2022-04-19

## 2022-04-19 RX ADMIN — SODIUM CHLORIDE, PRESERVATIVE FREE 10 ML: 5 INJECTION INTRAVENOUS at 21:43

## 2022-04-19 RX ADMIN — SODIUM CHLORIDE, PRESERVATIVE FREE 10 ML: 5 INJECTION INTRAVENOUS at 16:08

## 2022-04-19 RX ADMIN — ALBUMIN (HUMAN) 12.5 G: 0.25 INJECTION, SOLUTION INTRAVENOUS at 17:05

## 2022-04-19 RX ADMIN — CEFEPIME 2 G: 2 INJECTION, POWDER, FOR SOLUTION INTRAVENOUS at 04:25

## 2022-04-19 RX ADMIN — Medication 1 UNITS: at 21:41

## 2022-04-19 RX ADMIN — FAMOTIDINE 20 MG: 20 TABLET ORAL at 21:42

## 2022-04-19 RX ADMIN — DOBUTAMINE HYDROCHLORIDE 6 MCG/KG/MIN: 400 INJECTION INTRAVENOUS at 12:19

## 2022-04-19 RX ADMIN — GABAPENTIN 200 MG: 100 CAPSULE ORAL at 21:42

## 2022-04-19 RX ADMIN — GABAPENTIN 200 MG: 100 CAPSULE ORAL at 08:03

## 2022-04-19 RX ADMIN — HYDRALAZINE HYDROCHLORIDE 50 MG: 50 TABLET, FILM COATED ORAL at 04:26

## 2022-04-19 RX ADMIN — SUCRALFATE 1 G: 1 TABLET ORAL at 21:42

## 2022-04-19 RX ADMIN — POLYETHYLENE GLYCOL 3350 17 G: 17 POWDER, FOR SOLUTION ORAL at 08:03

## 2022-04-19 RX ADMIN — HEPARIN SODIUM 11 UNITS/KG/HR: 1000 INJECTION, SOLUTION INTRAVENOUS; SUBCUTANEOUS at 12:20

## 2022-04-19 RX ADMIN — CEFEPIME 2 G: 2 INJECTION, POWDER, FOR SOLUTION INTRAVENOUS at 15:59

## 2022-04-19 RX ADMIN — MIRTAZAPINE 7.5 MG: 15 TABLET, FILM COATED ORAL at 21:42

## 2022-04-19 RX ADMIN — FAMOTIDINE 20 MG: 20 TABLET ORAL at 08:03

## 2022-04-19 RX ADMIN — BUMETANIDE 2 MG: 0.25 INJECTION, SOLUTION INTRAMUSCULAR; INTRAVENOUS at 12:37

## 2022-04-19 RX ADMIN — AMIODARONE HYDROCHLORIDE 200 MG: 200 TABLET ORAL at 08:03

## 2022-04-19 RX ADMIN — HYDRALAZINE HYDROCHLORIDE 50 MG: 50 TABLET, FILM COATED ORAL at 21:42

## 2022-04-19 RX ADMIN — SUCRALFATE 1 G: 1 TABLET ORAL at 10:47

## 2022-04-19 RX ADMIN — SODIUM CHLORIDE, PRESERVATIVE FREE 10 ML: 5 INJECTION INTRAVENOUS at 07:36

## 2022-04-19 RX ADMIN — FENTANYL CITRATE 25 MCG: 0.05 INJECTION, SOLUTION INTRAMUSCULAR; INTRAVENOUS at 10:47

## 2022-04-19 RX ADMIN — GUAIFENESIN 100 MG: 200 SOLUTION ORAL at 12:48

## 2022-04-19 RX ADMIN — WARFARIN SODIUM 10 MG: 5 TABLET ORAL at 16:57

## 2022-04-19 RX ADMIN — ASPIRIN 81 MG CHEWABLE TABLET 81 MG: 81 TABLET CHEWABLE at 08:03

## 2022-04-19 RX ADMIN — LOSARTAN POTASSIUM 12.5 MG: 25 TABLET, FILM COATED ORAL at 08:03

## 2022-04-19 RX ADMIN — BUMETANIDE 2 MG: 0.25 INJECTION, SOLUTION INTRAMUSCULAR; INTRAVENOUS at 18:58

## 2022-04-19 RX ADMIN — HYDRALAZINE HYDROCHLORIDE 50 MG: 50 TABLET, FILM COATED ORAL at 12:41

## 2022-04-19 RX ADMIN — GUAIFENESIN 100 MG: 200 SOLUTION ORAL at 04:26

## 2022-04-19 RX ADMIN — Medication 2 UNITS: at 10:02

## 2022-04-19 RX ADMIN — GABAPENTIN 200 MG: 100 CAPSULE ORAL at 16:57

## 2022-04-19 RX ADMIN — SUCRALFATE 1 G: 1 TABLET ORAL at 07:36

## 2022-04-19 RX ADMIN — AMIODARONE HYDROCHLORIDE 200 MG: 200 TABLET ORAL at 17:02

## 2022-04-19 RX ADMIN — COLCHICINE 0.6 MG: 0.6 TABLET, FILM COATED ORAL at 08:04

## 2022-04-19 RX ADMIN — SODIUM CHLORIDE 9 ML/HR: 9 INJECTION, SOLUTION INTRAVENOUS at 12:19

## 2022-04-19 RX ADMIN — ALBUMIN (HUMAN) 12.5 G: 0.25 INJECTION, SOLUTION INTRAVENOUS at 08:03

## 2022-04-19 NOTE — PROGRESS NOTES
0745: Bedside and Verbal shift change report given to Swetha RN (oncoming nurse) by Madiha Appiah RN (offgoing nurse). Report included the following information SBAR and Cardiac Rhythm NSR w/ 1st degree and frequent PACs. 1691Steelvis Wong NP paged d/t increased CT output by Danica BRADLEY    9373: PICC dressing changed and lines changed per protocol.  Heparin started at 11u d/t subtherapeutic INR

## 2022-04-19 NOTE — PROGRESS NOTES
2000 - Bedside and Verbal shift change report given to Marcela Yousif RN (oncoming nurse) by Cherelle Garcia RN (offgoing nurse). Report included the following information SBAR, Kardex, OR Summary, Intake/Output, MAR, Recent Results and Cardiac Rhythm Afib.     0350 - pt continously coughing. Paged intensivist for robitussin. Order placed. 0745 - Pt continuing to cough. Dumped approx 1L from chest tubes. Jaison James NP with HF paged. Plan is to notify Marcela Yousif NP. No new orders at this time. 0800 - Bedside and Verbal shift change report given to Gina Nathan RN (oncoming nurse) by Marcela Yousif RN (offgoing nurse). Report included the following information SBAR, Kardex, OR Summary, Intake/Output, MAR, Recent Results and Cardiac Rhythm NSR with 1st degree.

## 2022-04-19 NOTE — PROGRESS NOTES
[facility] in [place], [place]  Inpatient Progress Note      Patient name: Ross Day  Patient : 1945  Patient MRN: [id number]  Consulting MD: David Mcdonough MD  Date of service: 22    REASON FOR REFERRAL:  Management of LVAD    PLAN OF CARE:  · 69 y.o. male with severe ischemic cardiomyopathy, LVEF 15-20% s/p HM3 LVAD implant as DT on 22 by Dr. Stacy Martinez c/b intraoperative bleeding requiring multiple blood products and subsequent RV failure with severe TR on high dose dobutamine IV and sternal wound infection on antibiotics  · Plan for remainder of hospitalization includes:  · Surveillance weekly echos to determine if RV function improves and TV coapts  · Completion of course of antibiotics for sternal wound infection  · Diuresis and removal of chest tubes when ready  · PT/OT/Nutrition    PLAN:  TTE shows severe TR but improved RV function; continue to optimize medically   Repeat TTE today to eval RV and TV  Continue speed 4800rpms, low speed 4400  Continue dobutamine 6 mcg/kg/min, keep this dose until repeat TTE  Continue hydralazine 50 mg PO q8h   Continue  amiodarone to 200 mg PO twice daily  Increase losartan 25 mg PO daily   Resume Bumex 2mg IV BID  Diuretics PRN CVP > 14 mmHg (monitor via PICC)   Monitor MAP via doppler  Extended course of cefepime q12h for soft tissue infection at sternotomy site; examined by Dr. Stacy Martinez; now continuing pending culture of driveline exit site  Driveline dressing changes daily for now until 5/3/22  Monitor drive line drainage   Wound culture negative   NANDINI hose    Needs EKG weekly on   Continue warfarin, goal INR 2-3; 10mg tonight   Resume Heparin gtt until INR > 2  ASA 81mg daily  Continue colchicine for pericarditis   Continue gabapentin 200 mg PO TID   Toradol q6h PRN and before PT/OT; OK with Dr. Stacy Martinez, renal function stable  IV fentanyl prn (pt with hx anaphylaxis to oxycodone)  Continue bowel regimen as needed   Pulmonary toilet  Monitor CT on water seal   Repeat iron profile   CPAP QHS  Advanced care plan forms on file   Strict I/O, daily weights, Na+ restricted diet   Continue VAD education   Equipment ordered    LAST 24 HOURS:  No acute events overnight  Net neg 500ml  VSS and LVAD flows stable- no acute alarms on VAD  Creatinine stable 0.80  T Bili 1.1   PBNP up to 4000  Increased CT output of serous drainage       REVIEW OF SYSTEMS:  Review of Systems   Constitutional: Negative for chills, fever, malaise/fatigue and weight loss. Respiratory: Negative for cough and shortness of breath. Cardiovascular: Negative for chest pain, palpitations, orthopnea and leg swelling. Gastrointestinal: Negative for abdominal pain, nausea and vomiting. Neurological: Negative for dizziness and weakness. Psychiatric/Behavioral: Negative. LIFE GOALS:  Patient's personal goals include: getting stronger and going home soon  Important upcoming milestones or family events:    The patient identifies the following as important for living well: being independent, being at home, enjoying family time        OBJECTIVE:  PHYSICAL EXAM:  Visit Vitals  BP 97/71 (BP 1 Location: Left upper arm, BP Patient Position: At rest)   Pulse 78   Temp 98.9 °F (37.2 °C)   Resp 22   Ht 6' 1\" (1.854 m)   Wt 196 lb 3.4 oz (89 kg)   SpO2 97%   BMI 25.89 kg/m²       LVAD INTERROGATION:  Device interrogated in person  Device function normal, normal flow, no events  LVAD   Pump Speed (RPM): 4800  Pump Flow (LPM): 3.1  MAP: 78 (L radial doppler)  PI (Pulsitility Index): 7.1  Power: 3.1   Test: Yes  Back Up  at Bedside & Labeled: Yes  Power Module Test: Yes  Driveline Site Care: No  Driveline Dressing: Clean, Dry, and Intact  MAP in Therapeutic Range (Outpatient): Yes  Testing  Alarms Reviewed: Yes  Back up SC speed: 4800  Back up Low Speed Limit: 4400  Emergency Equipment with Patient?: Yes  Emergency procedures reviewed?: Yes  Drive line site inspected?: No  Drive line intergrity inspected?: Yes  Drive line dressing changed?: No      Physical Exam  Vitals reviewed. Constitutional:       General: He is not in acute distress. Appearance: Normal appearance. Cardiovascular:      Rate and Rhythm: Normal rate and regular rhythm. Comments: LVAD Hum noted on auscultation  Pulmonary:      Effort: Pulmonary effort is normal. No respiratory distress. Abdominal:      General: Bowel sounds are normal. There is no distension. Palpations: Abdomen is soft. Tenderness: There is no abdominal tenderness. Musculoskeletal:      Right lower le+ Pitting Edema present. Left lower le+ Pitting Edema present. Skin:     General: Skin is warm and dry. Capillary Refill: Capillary refill takes less than 2 seconds. Neurological:      General: No focal deficit present. Mental Status: He is alert and oriented to person, place, and time. Psychiatric:         Mood and Affect: Mood normal.         Behavior: Behavior normal.         Thought Content: Thought content normal.         Judgment: Judgment normal.            CARDIAC IMAGING AND DIAGNOSTICS REVIEWED:  Echo 22- pending   Echo (22)    Left Ventricle: Left ventricle size is normal. Moderately increased wall thickness. Findings consistent with concentric remodeling. Severe global hypokinesis present. Severely reduced left ventricular systolic function with a visually estimated EF of 10 -15%. 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   Aortic Valve: Moderate sclerosis of the aortic valve cusp.   Mitral Valve: Moderately thickened leaflet. Mild transvalvular regurgitation.   Tricuspid Valve: Severe transvalvular regurgitation.   Right Atrium: Right atrium is severely dilated.     Echo (3/2/22)   · LV 10-15%  · Mod-severe MR      Echo (21):  · LV 15-20%.    · Mod-severe, MR, mod-TR     Echo (5/20/21)  · LV: Estimated LVEF is 20 - 25%. Normal wall thickness. Mildly dilated left ventricle. Severely and globally reduced systolic function. Severe (grade 4) left ventricular diastolic dysfunction. · LA: Severely dilated left atrium. · RA: Severely dilated right atrium. · MV: Moderate mitral valve regurgitation is present. · TV: Moderate tricuspid valve regurgitation is present. · AO: Mild aortic root dilatation. · RV: Pacer/ICD present. · LVED 6.2cm     EKG (5/20/21) SB with 1degree AV block, QRS 116ms     LHC (5/24/21) Native Coronaries: LM - moderate to severe distal disease 50%. % prox occluded (), heavily calcified, LCx: 80% proximal stenosis. OM1 is  (seen filling faintly via collaterals). OM2 99% stenosis. RCA: 100% proximal occluded.  LIMA to LAD: patent, supplies only a diagonal branch (probably D2). The LAD distal to the bifurcation is 100% occluded () and fills via right to left collaterals off the SVG to RCA. SVG to R-PDA: patent with moderate diffuse irregularities but no obstructive disease in the graft.    No appealing interventional targets.      NST as above     ICD Interrogation (11/12/2021) [employer] VVI, thoracic impedence trending up, no events, normal device function and good battery  ICD interrogation (5/12/21) [employer] single lead, no events, normal device function and good battery     HEMODYNAMICS:  RHC 5/24/21 CI 1.97, PA 33/9/17, RA 1, PCWP 6- off milrinone   CPEST not done     Patient underwent a 6 minute walk test 11/12/2021     6 Min Walk Report 11/12/2021 7/6/2021 5/20/2021   (PRE) HR 88 98 74   (PRE) O2 Sat 100 - 99   (POST)  - 81   (POST) O2 Sat 99 98% on Ra 99   Distance in Meters 386.58 - 1158. 24       OTHER IMAGING:  CXR (6/17/21) clear  CT 5/21/21 1. Irregular pulmonary nodule in the left upper lobe measuring 2 cm, suspicious for primary pulmonary malignancy.  2. Additional 6 mm left upper lobe pulmonary nodule. 3. Severe calcific atherosclerosis of the coronary arteries and abdominal aorta. No aneurysm. 4. Cardiomegaly. 5. No acute abnormality within the chest, abdomen, or pelvis. LABORATORY RESULTS:     Labs Latest Ref Rng & Units 4/19/2022 4/18/2022 4/17/2022 4/16/2022 4/15/2022 4/14/2022 4/14/2022   WBC 4.1 - 11.1 K/uL 9.0 9.6 9.3 8.8 9.2 - 7.7   RBC 4.10 - 5.70 M/uL 3.09(L) 3.10(L) 2.97(L) 2.94(L) 3.17(L) - 2.93(L)   Hemoglobin 12.1 - 17.0 g/dL 8. 0(L) 8. 1(L) 7. 7(L) 7. 7(L) 8.2(L) 8.2(L) 7. 7(L)   Hematocrit 36.6 - 50.3 % 25. 3(L) 25. 1(L) 23. 9(L) 23. 6(L) 25. 5(L) 25. 5(L) 23. 7(L)   MCV 80.0 - 99.0 FL 81.9 81.0 80.5 80.3 80.4 - 80.9   Platelets 088 - 572 K/uL 240 243 205 203 208 - 177   Lymphocytes 12 - 49 % - - - - - - -   Monocytes 5 - 13 % - - - - - - -   Eosinophils 0 - 7 % - - - - - - -   Basophils 0 - 1 % - - - - - - -   Bands 0 - 6 % - - - - - - -   Albumin 3.5 - 5.0 g/dL 2. 6(L) 2. 6(L) 2. 6(L) 2. 7(L) 2. 9(L) - 2. 8(L)   Calcium 8.5 - 10.1 MG/DL 8. 0(L) 8. 3(L) 8.4(L) 8.4(L) 8. 3(L) - 8. 4(L)   Glucose 65 - 100 mg/dL 191(H) 153(H) 174(H) 118(H) 141(H) - 115(H)   BUN 6 - 20 MG/DL 18 26(H) 23(H) 28(H) 26(H) - 23(H)   Creatinine 0.70 - 1.30 MG/DL 0.80 0.89 0.84 0.85 0.87 - 0.83   Sodium 136 - 145 mmol/L 131(L) 127(L) 128(L) 128(L) 129(L) - 130(L)   Potassium 3.5 - 5.1 mmol/L 4.4 5.0 4.8 4.8 4.6 - 4.0   TSH 0.36 - 3.74 uIU/mL - - - - - - -   LDH 85 - 241 U/L 206 192 196 201 192 - 185   Some recent data might be hidden     Lab Results   Component Value Date/Time    TSH 2.26 04/10/2022 03:00 AM    TSH 1.68 03/01/2022 11:50 AM    TSH 1.47 05/26/2021 10:44 PM    TSH 1.97 05/20/2021 04:28 PM    TSH 1.41 02/09/2021 03:05 PM    TSH 1.740 08/14/2018 09:58 AM    TSH 1.710 10/18/2017 12:00 AM         HISTORY:  PAST MEDICAL HISTORY:  Past Medical History:   Diagnosis Date    CAD (coronary artery disease) '90 '97, '13 x 2    [place] [facility] [place] Calculus of kidney     Colonic polyps     Congestive heart failure, unspecified     Diabetes ([facility] [address]     Gastritis     Hypercholesterolemia     Sleep apnea        PAST SURGICAL HISTORY:  Past Surgical History:   Procedure Laterality Date    COLONOSCOPY  04/06/2011    16, due 21    COLONOSCOPY N/A 3/2/2022    COLONOSCOPY    :- performed by Taryn [facility]rera [facility] at [facility] ENDOSCOPY    ENDOSCOPY, COLON, DIAGNOSTIC      11, due 16    HX CORONARY ARTERY BYPASS GRAFT  8/22/12    x 4 vessel by MISSY Ramirez    HX HEENT      LASIK    HX PACEMAKER PLACEMENT  1/30/13    CA CARDIAC SURG PROCEDURE UNLIST  2012    x 4 vessels       FAMILY HISTORY:  Family History   Problem Relation Age of Onset    Heart Disease Mother         MI    Heart Disease Father         CAD & PVD    Lung Disease Father     Cancer Father         lung    Diabetes Maternal Grandmother     Heart Disease Other         CAD    Stroke Sister        SOCIAL HISTORY:  Social History     Socioeconomic History    Marital status:    Tobacco Use    Smoking status: Never Smoker    Smokeless tobacco: Never Used   Vaping Use    Vaping Use: Never used   Substance and Sexual Activity    Alcohol use:  Yes     Alcohol/week: 0.0 standard drinks     Comment:  VERY RARE    Drug use: No       ALLERGY:  Allergies   Allergen Reactions    Oxycodone Anaphylaxis    Pcn [Penicillins] Hives        CURRENT MEDICATIONS:    Current Facility-Administered Medications:     guaiFENesin (ROBITUSSIN) 100 mg/5 mL oral liquid 100 mg, 100 mg, Oral, Q4H PRN, Leslie Johnson MD, 100 mg at 04/19/22 0426    heparin 25,000 units in  mL infusion, 11-25 Units/kg/hr, IntraVENous, TITRATE, Terry, Danica B, NP    warfarin (COUMADIN) tablet 10 mg, 10 mg, Oral, ONCE, Terry, Danica B, NP    [START ON 4/20/2022] losartan (COZAAR) tablet 25 mg, 25 mg, Oral, DAILY, Terry, Danica B, NP    bumetanide (BUMEX) injection 2 mg, 2 mg, IntraVENous, BID, Terry, Danica B, NP    cefepime (MAXIPIME) 2 g in 0.9% sodium chloride (MBP/ADV) 100 mL MBP, 2 g, IntraVENous, Q12H, Joshuaa Ferermann B, NP, 2 g at 04/19/22 0425    polyethylene glycol (MIRALAX) packet 17 g, 17 g, Oral, DAILY, Mirella Monroeermann B, NP, 17 g at 04/19/22 0803    senna-docusate (PERICOLACE) 8.6-50 mg per tablet 1 Tablet, 1 Tablet, Oral, DAILY PRN, Lc Garner NP    [Held by provider] potassium chloride SR (KLOR-CON 10) tablet 10 mEq, 10 mEq, Oral, DAILY, Mirella Monroeermann B, NP, 10 mEq at 04/17/22 0859    oxymetazoline (AFRIN) 0.05 % nasal spray 2 Spray, 2 Spray, Both Nostrils, BID PRN, Nolia Habermann B, NP    hydrALAZINE (APRESOLINE) tablet 50 mg, 50 mg, Oral, Q8H, Polliard, [place] Campanile T, NP, 50 mg at 04/19/22 0426    amiodarone (CORDARONE) tablet 200 mg, 200 mg, Oral, BID, Polliard, [place] Campanile T, NP, 200 mg at 04/19/22 0803    colchicine tablet 0.6 mg, 0.6 mg, Oral, DAILY, Polliard, [place] Campanile T, NP, 0.6 mg at 04/19/22 0804    alteplase (CATHFLO) 1 mg in sterile water (preservative free) 1 mL injection, 1 mg, InterCATHeter, PRN, Tom Morales MD, 1 mg at 04/16/22 1149    bisacodyL (DULCOLAX) suppository 10 mg, 10 mg, Rectal, DAILY PRN, Emiliana Win MD, 10 mg at 04/12/22 1533    gabapentin (NEURONTIN) capsule 200 mg, 200 mg, Oral, TID, Polliard, Scott Campanile T, NP, 200 mg at 04/19/22 0803    albumin human 25% (BUMINATE) solution 12.5 g, 12.5 g, IntraVENous, BID, Polliard, Scott Campanile T, NP, 12.5 g at 04/19/22 0803    famotidine (PEPCID) tablet 20 mg, 20 mg, Oral, Q12H, Emiliana Win MD, 20 mg at 04/19/22 0803    bumetanide (BUMEX) injection 1 mg, 1 mg, IntraVENous, Q6H PRN, Emiliana Win MD, 1 mg at 04/10/22 1234    mirtazapine (REMERON) tablet 7.5 mg, 7.5 mg, Oral, QHS, Terry, Danica B, NP, 7.5 mg at 04/18/22 2117    lidocaine 4 % patch 1 Patch, 1 Patch, TransDERmal, Q24H, Terry Danica B, NP, 1 Patch at 04/18/22 1343    Warfarin NP/MD dosing, , Other, PRN, Terry, Danica B, NP    melatonin tablet 3 mg, 3 mg, Oral, QHS PRN, Fabiana Dela Cruz MD, 3 mg at 04/10/22 0251    hydrALAZINE (APRESOLINE) 20 mg/mL injection 10 mg, 10 mg, IntraVENous, Q6H PRN, Adonica Chafe B, NP, 10 mg at 04/18/22 1809    aspirin chewable tablet 81 mg, 81 mg, Oral, DAILY, Adonica Chafe B, NP, 81 mg at 04/19/22 0803    hydrALAZINE (APRESOLINE) 20 mg/mL injection 5 mg, 5 mg, IntraVENous, Q6H PRN, Adonica Chafe B, NP, 5 mg at 04/13/22 3058    sodium chloride (NS) flush 5-40 mL, 5-40 mL, IntraVENous, Q8H, Polliard, Emily Santizo T, NP, 10 mL at 04/19/22 0736    sodium chloride (NS) flush 5-40 mL, 5-40 mL, IntraVENous, PRN, Diana Wong, NP    0.9% sodium chloride infusion, 9 mL/hr, IntraVENous, CONTINUOUS, Diana Wong NP, Last Rate: 3 mL/hr at 04/18/22 0755, 3 mL/hr at 04/18/22 0755    acetaminophen (TYLENOL) tablet 650 mg, 650 mg, Oral, Q6H PRN, Diana Wong, NP, 650 mg at 04/18/22 1344    naloxone (NARCAN) injection 0.4 mg, 0.4 mg, IntraVENous, PRN, PollDiana soliman NP    [facility]ron [facility]) injection 4 mg, 4 mg, IntraVENous, Q4H PRN, mEily Wong, NP, 4 mg at 04/11/22 2139    albuterol-ipratropium (DUO-NEB) 2.5 MG-0.5 MG/3 ML, 3 mL, Nebulization, Q6H PRN, Diana Wong NP    midazolam (VERSED) injection 1 mg, 1 mg, IntraVENous, Q1H PRN, PolliarDiana reynoso, NP    ELECTROLYTE REPLACEMENT NOTE: Nurse to review Serum Potassium and Magnesuim levels and Initiate Electrolyte Replacement Protocol as needed, 1 Each, Other, PRN, Diana Wong NP    insulin regular (NOVOLIN R, HUMULIN R) 100 Units in 0.9% sodium chloride 100 mL infusion, 0-50 Units/hr, IntraVENous, TITRATE, Diana Wong NP, Stopped at 04/09/22 1619    glucose chewable tablet 16 g, 4 Tablet, Oral, PRN, Diana Wong NP    glucagon (GLUCAGEN) injection 1 mg, 1 mg, IntraMUSCular, PRN, Diana Wong NP    insulin lispro (HUMALOG) injection, , SubCUTAneous, AC&HS, Emily Wong NP, 2 Units at 04/19/22 1002    dextrose 10 % infusion 0-250 mL, 0-250 mL, IntraVENous, PRN, Geni Wong NP, Last Rate: 999 mL/hr at 04/07/22 2145, 45 mL at 04/07/22 2145    sucralfate (CARAFATE) tablet 1 g, 1 g, Oral, AC&HS, Geni Wong, NP, 1 g at 04/19/22 1047    0.45% sodium chloride infusion, 10 mL/hr, IntraVENous, CONTINUOUS, FiserMery MD, Stopped at 04/10/22 0730    fentaNYL citrate (PF) injection 25 mcg, 25 mcg, IntraVENous, Q2H PRN, Marvin, Geni Mitchell, NP, 25 mcg at 04/19/22 1047    DOBUTamine (DOBUTREX) 1,000 mg/250 mL (4,000 mcg/mL) infusion, 0-10 mcg/kg/min, IntraVENous, TITRATE, Geni Wong, NP, Last Rate: 6.8 mL/hr at 04/18/22 0755, 6 mcg/kg/min at 04/18/22 0755    PATIENT CARE TEAM:  Patient Care Team:  Chino Harding MD as PCP - General (Internal Medicine)  Chino Harding MD as PCP - [address] [place] [address] Provider  Debo Barajas MD as Physician (Cardiology)  Johnie Fields MD as Physician (Gastroenterology)  Pradip Dunne MD as Physician (Orthopedic Surgery)  Cortes Brian MD as Physician (Ophthalmology)  Ernesto Sandy MD (Cardiology)  Augustine Otoole MD (Cardiology)         Thank you for allowing us to participate in this patient's care. Severiano Child, NP   [facility] [address]  [address] [facility], [address]  Phone: [phone]    [facility] ATTENDING ADDENDUM    Patient was seen and examined in person. Data and notes were reviewed. I have discussed and agree with the plan as noted in the NP note above without further additions.     Ciarra Mendoza MD PhD  [facility] Kerr

## 2022-04-19 NOTE — PROGRESS NOTES
1030: Bedside and Verbal shift change report given to [address] (oncoming nurse) by John Alvarado (offgoing nurse). Report included the following information SBAR, Kardex, OR Summary, Intake/Output, MAR, Recent Results, Med Rec Status, Cardiac Rhythm A NSR and Alarm Parameters . 1054: Echo called to get estimate on time until they are able to come to bedside- left message with reception. 1140: Patient back to bed for line redressing    1145: Echo at bedside. PICC dressing to be changed following echo. [id number]: PICC dressing changed per protocol. [id number]: Chito Mosley (Patient's wife) changed LVAD dressing with RN supervision. Physical and verbal assistance provided. Chito Mosley expresses that she is becoming more comfortable with changing the dressing. 1930: Bedside and Verbal shift change report given to RN (oncoming nurse) by Annika Fischer RN (offgoing nurse). Report included the following information SBAR, Kardex, Intake/Output, Recent Results, Med Rec Status, Cardiac Rhythm NSR and Alarm Parameters .

## 2022-04-19 NOTE — PROGRESS NOTES
Critical Care Note      Cardiac surgery was called for the evaluation and management of: NYHA class IV A/C systolic heart failure, inotrope dependence, right ventricular dysfunction      The critical nature of the patient's condition includes the following: The patient is at imminent risk of death from NYHA class IV A/C systolic heart failure, inotrope dependence, and right ventricular dysfunction. Ros Prado is at imminent risk of needing escalation of MCS and right sided MCS     Critical care diagnoses that are being treated:  NYHA class IV A/C systolic heart failure / inotrope dependence  Right ventricular dysfunction      HPI: Patient is a 68 yo with severe NYHA class IV A/C systolic heart failure, inotrope dependence, and right ventricular dysfunction.  The patient is at imminent risk of death from NYHA class IV A/C systolic heart failure, inotrope dependence, and right ventricular dysfunction.  He is at imminent risk of needing escalation of MCS and right sided MCS.  Asked to evaluate for permanent LVAD     NYHA class IV A/C systolic heart failure / Cardiogenic shock   Remains on Dobutamine  NT pro-BNP 5K  Pulmonary edema on CXR  CVP 10-15, PA 30/20's  Cardiac index 2's  MAPs 70-80s  Discussed with HF team      Right ventricular dysfunction   Continues on Dobutamine   LFTs 100's  Likely some hepatic congestion  Lactic acid 1.1      Intake/Output Summary (Last 24 hours) at 4/19/2022 1724  Last data filed at 4/19/2022 1200  Gross per 24 hour   Intake 496 ml   Output 2625 ml   Net -2129 ml      Visit Vitals  BP (!) 106/91   Pulse 76   Temp 98.8 °F (37.1 °C)   Resp 19   Ht 6' 1\" (1.854 m)   Wt 196 lb 3.4 oz (89 kg)   SpO2 96%   BMI 25.89 kg/m²      CXR Results  (Last 48 hours)               04/19/22 0444  XR CHEST PORT Final result    Impression:  1. No change pulmonary edema   2. Persistent bilateral pleural effusions left greater than right which have   increased.  The pleural effusions cause atelectasis in the lower lobes left   greater than right. Narrative:  EXAM: XR CHEST PORT       INDICATION: Heart failure       COMPARISON: 4/18/2022       FINDINGS: A portable AP radiograph of the chest was obtained at 0419 hours. The   patient is on a cardiac monitor. There is no change in the cardiomegaly. LVAD   and ICD remain in place. PICC line overlies the SVC. There continues to be bilateral pleural effusions left greater than right which   have mildly increased. There continues to be pulmonary vascular congestion mild   pulmonary edema which is basically unchanged. 04/18/22 0500  XR CHEST PORT Final result    Impression:  Cardiomegaly, pleural effusions, pulmonary vascular congestion. Narrative:  PORTABLE CHEST RADIOGRAPH/S: 4/18/2022 5:00 AM       INDICATION: Heart failure. COMPARISON: 4/17/2022, 4/16/2022. TECHNIQUE: Portable frontal semiupright radiograph/s of the chest.       FINDINGS:    Passive atelectasis is associated with layering bilateral pleural effusions. There is pulmonary vascular congestion without overt interstitial edema. The   central airways are patent. No pneumothorax. The heart is enlarged, even given   portable technique. Post CABG, pacemaker/AICD placement, and LVAD placement. A   right PICC is in appropriate position.                     LVAD   Pump Speed (RPM): 4850  Pump Flow (LPM): 2.7  MAP: 78 (L radial doppler)  PI (Pulsitility Index): 10.2  Power: 3.2   Test: Yes  Back Up  at Bedside & Labeled: Yes  Power Module Test: Yes  Driveline Site Care: No  Driveline Dressing: Clean, Dry, and Intact  MAP in Therapeutic Range (Outpatient): Yes  Testing  Alarms Reviewed: Yes  Back up SC speed: 4800  Back up Low Speed Limit: 4400  Emergency Equipment with Patient?: Yes  Emergency procedures reviewed?: Yes  Drive line site inspected?: No  Drive line intergrity inspected?: Yes  Drive line dressing changed?: No      Total critical care time - 30 minutes (CPT 05870)      I personally spent the above critical care time. This is time spent at this critically ill patient's bedside / unit / floor actively involved in patient care as well as the coordination of care. This does not include any procedural time which has been billed separately. This does not include any NP/PA patient care time. I had a face to face encounter with the patient, reviewed and interpreted patient data including clinical events, labs, images, vital signs, I/O's, and examined patient. I have discussed the case and the plan and management of the patient's care with the consulting services and bedside nurses. This patient has a high probability of imminent, clinically significant deterioration, which requires the highest level of preparedness to intervene urgently. I participated in the decision-making and personally managed or directed the management of life and organ supporting interventions to treat or prevent imminent deterioration. This patient has a critical illness or injury that is acutely impairing one or more vital organ systems such that there is a high probability of imminent or life threatening deterioration in the patient's condition. This patient requires high complexity medical decision making to assess, manipulate, and support vital organ system failure. After stabilization, this patient still requires management to prevent further life / organ threatening deterioration.

## 2022-04-19 NOTE — PROGRESS NOTES
Critical Care Note      Cardiac surgery was called for the evaluation and management of: NYHA class IV A/C systolic heart failure, inotrope dependence, right ventricular dysfunction      The critical nature of the patient's condition includes the following: The patient is at imminent risk of death from NYHA class IV A/C systolic heart failure, inotrope dependence, and right ventricular dysfunction. [facility] is at imminent risk of needing escalation of MCS and right sided MCS     Critical care diagnoses that are being treated:  NYHA class IV A/C systolic heart failure / inotrope dependence  Right ventricular dysfunction      HPI: Patient is a 70 yo with severe NYHA class IV A/C systolic heart failure, inotrope dependence, and right ventricular dysfunction.  The patient is at imminent risk of death from NYHA class IV A/C systolic heart failure, inotrope dependence, and right ventricular dysfunction.  He is at imminent risk of needing escalation of MCS and right sided MCS.  Asked to evaluate for permanent LVAD     NYHA class IV A/C systolic heart failure / Cardiogenic shock   On Dobutamine  NT pro-BNP 4K  Pulmonary edema on CXR  CVP 10-15, PA 30/20's  Cardiac index 2's  MAPs 70-80s  Discussed with HF team   Some dumps of serous fluid with standing  Increasing diuretics      Right ventricular dysfunction   Continues on Dobutamine   LFTs 50's  Likely some hepatic congestion  Lactic acid 0.9            Intake/Output Summary (Last 24 hours) at 4/19/2022 1728  Last data filed at 4/19/2022 1200  Gross per 24 hour   Intake 496 ml   Output 2625 ml   Net -2129 ml      Visit Vitals  BP (!) 106/91   Pulse 76   Temp 98.8 °F (37.1 °C)   Resp 19   Ht 6' 1\" (1.854 m)   Wt 196 lb 3.4 oz (89 kg)   SpO2 96%   BMI 25.89 kg/m²      CXR Results  (Last 48 hours)               04/19/22 0444  XR CHEST PORT Final result    Impression:  1. No change pulmonary edema   2.  Persistent bilateral pleural effusions left greater than right which have increased. The pleural effusions cause atelectasis in the lower lobes left   greater than right. Narrative:  EXAM: XR CHEST PORT       INDICATION: Heart failure       COMPARISON: 4/18/2022       FINDINGS: A portable AP radiograph of the chest was obtained at 0419 hours. The   patient is on a cardiac monitor. There is no change in the cardiomegaly. LVAD   and ICD remain in place. PICC line overlies the SVC. There continues to be bilateral pleural effusions left greater than right which   have mildly increased. There continues to be pulmonary vascular congestion mild   pulmonary edema which is basically unchanged. 04/18/22 0500  XR CHEST PORT Final result    Impression:  Cardiomegaly, pleural effusions, pulmonary vascular congestion. Narrative:  PORTABLE CHEST RADIOGRAPH/S: 4/18/2022 5:00 AM       INDICATION: Heart failure. COMPARISON: 4/17/2022, 4/16/2022. TECHNIQUE: Portable frontal semiupright radiograph/s of the chest.       FINDINGS:    Passive atelectasis is associated with layering bilateral pleural effusions. There is pulmonary vascular congestion without overt interstitial edema. The   central airways are patent. No pneumothorax. The heart is enlarged, even given   portable technique. Post CABG, pacemaker/AICD placement, and LVAD placement. A   right PICC is in appropriate position.                     LVAD   Pump Speed (RPM): 4850  Pump Flow (LPM): 2.7  MAP: 78 (L radial doppler)  PI (Pulsitility Index): 10.2  Power: 3.2   Test: Yes  Back Up  at Bedside & Labeled: Yes  Power Module Test: Yes  Driveline Site Care: No  Driveline Dressing: Clean, Dry, and Intact  MAP in Therapeutic Range (Outpatient): Yes  Testing  Alarms Reviewed: Yes  Back up SC speed: 4800  Back up Low Speed Limit: 4400  Emergency Equipment with Patient?: Yes  Emergency procedures reviewed?: Yes  Drive line site inspected?: No  Drive line intergrity inspected?: Yes  Drive line dressing changed?: No      Total critical care time - 30 minutes (CPT 98986)      I personally spent the above critical care time. This is time spent at this critically ill patient's bedside / unit / floor actively involved in patient care as well as the coordination of care. This does not include any procedural time which has been billed separately. This does not include any NP/PA patient care time. I had a face to face encounter with the patient, reviewed and interpreted patient data including clinical events, labs, images, vital signs, I/O's, and examined patient. I have discussed the case and the plan and management of the patient's care with the consulting services and bedside nurses. This patient has a high probability of imminent, clinically significant deterioration, which requires the highest level of preparedness to intervene urgently. I participated in the decision-making and personally managed or directed the management of life and organ supporting interventions to treat or prevent imminent deterioration. This patient has a critical illness or injury that is acutely impairing one or more vital organ systems such that there is a high probability of imminent or life threatening deterioration in the patient's condition. This patient requires high complexity medical decision making to assess, manipulate, and support vital organ system failure. After stabilization, this patient still requires management to prevent further life / organ threatening deterioration.

## 2022-04-19 NOTE — PROGRESS NOTES
Problem: Self Care Deficits Care Plan (Adult)  Goal: *Acute Goals and Plan of Care (Insert Text)  Description: FUNCTIONAL STATUS PRIOR TO ADMISSION: pt lives with wife, independent prior    HOME SUPPORT: The patient lived with wife but did not require assist.    Occupational Therapy Goals  Goals reviewed and remain appropriate 4/18/2022  Initiated 4/8/2022  1. Patient will perform ADLs standing 5 mins without fatigue or LOB with supervision/set-up within 7 day(s). 2.  Patient will perform lower body ADLs with minimal assistance/contact guard assist within 7 day(s). 3.  Patient will perform upper body ADLs with minimal assistance/contact guard assist within 7 day(s). 4.  Patient will perform toilet transfers with minimal assistance/contact guard assist within 7 day(s). 5.  Patient will perform all aspects of toileting with minimal assistance/contact guard assist within 7 day(s). 6.  Patient will participate in cardiac/sternal upper extremity therapeutic exercise/activities to increase independence with ADLs with supervision/set-up for 5 minutes within 7 day(s). 7.  Patient will perform VAD switchover routine as part of ADL routine (including batteries<>batteries, battery clip<>battery clip, mock SC<>SC) with minimal assistance/contact guard assist within 7 days. Outcome: Progressing Towards Goal   OCCUPATIONAL THERAPY TREATMENT  Patient: Lynsey Asencio (12 y.o. male)  Date: 4/19/2022  Diagnosis: HFrEF (heart failure with reduced ejection fraction) ([facility]) [I50.20] <principal problem not specified>  Procedure(s) (LRB):  REDO STERNOTOMY; RIGHT GROIN CUTDOWN FOR CANNULATION;  INSERTION OF LEFT VENTRICULAR ASSIST DEVICE (HMIII); AORTIC VALVE CLOSURE CHEL BY DR. Debbie Landa. (N/A) 14 Days Post-Op  Precautions: Fall (mindful movement, LVAD)  Chart, occupational therapy assessment, plan of care, and goals were reviewed. ASSESSMENT  Patient continues with skilled OT services and is progressing towards goals. Patient semi supine in bed and agreeable to get out of bed but requesting to return to bed at end of session. Patient presents with some \"jumpiness\" in upper extremities, improving standing balance/tolerance, impaired strength and range of motion, and impaired activity tolerance. Patient transferring to edge of bed and standing to take several steps towards sink. Stood at sink ~5 minutes for grooming to wash face and brush teeth with fair balance throughout. Patient then taking several steps back towards bed and sitting edge of bed for several minutes to complete cardiac exercises, see grid below for details. Patient able to bring legs up to bed one at a time during sit -> sup and patient provided with warm blankets at end of session as patient complaining of room feeling cold. Patient would benefit from skilled OT services during admission to improve independence with self care and functional mobility/transfers. Recommend discharge to home with family care and HHOT at this time. Patient is verbalizing and is demonstrating understanding of mindful-based movements (\"move in the tube\") principles of keeping UEs proximal to ribcage to prevent lateral pull on the sternum during load-bearing activities with visual and verbal cues required for compliance. Current Level of Function Impacting Discharge (ADLs): SBA to CGA for mobility/transfers, SBA grooming at sink    Other factors to consider for discharge: prior level of function, family support, patient motivated to participate with therapy and return to prior independence         PLAN :  Patient continues to benefit from skilled intervention to address the above impairments. Continue treatment per established plan of care to address goals.     Recommend with staff: up to chair 3x/day for meals, BSC for toileting    Recommend next OT session: LVAD switchover/education     Recommendation for discharge: (in order for the patient to meet his/her long term goals)  Occupational therapy at least 2 days/week in the home AND ensure assist and/or supervision for safety with LVAD maintenance    This discharge recommendation:  Has been made in collaboration with the attending provider and/or case management    IF patient discharges home will need the following DME: shower chair       SUBJECTIVE:   Patient stated I don't know why they keep this room so cold.     OBJECTIVE DATA SUMMARY:   Cognitive/Behavioral Status:  Neurologic State: Alert  Orientation Level: Oriented X4  Cognition: Appropriate decision making; Appropriate for age attention/concentration; Appropriate safety awareness; Follows commands             Functional Mobility and Transfers for ADLs:  Bed Mobility:  Rolling: Stand-by assistance  Supine to Sit: Stand-by assistance  Sit to Supine: Stand-by assistance; Additional time  Scooting: Stand-by assistance    Transfers:  Sit to Stand: Contact guard assistance (from bed height)          Balance:  Sitting: Intact  Standing: Impaired; Without support  Standing - Static: Good  Standing - Dynamic : Fair;Occasional    ADL Intervention:       Grooming  Position Performed: Standing  Washing Face: Stand-by assistance  Brushing Teeth: Stand-by assistance (using cup to avoid deep bending)  Cues: Verbal cues provided                                  Patient instructed and educated on mindful movement principles based on Move in The Tube concept to include maintaining bilateral elbows close to rib cage when performing any load-bearing activity such as getting in/out of bed, pushing up from a chair, opening a door, or lifting a box. Patient was given a handout with diagrams of each correct/incorrect method of performing each of the above tasks. Patient instructed no asymmetrical reaching over head to ensure B UEs when shoulders >90* i.e. reaching in cabinets and dressing.  Instruction on upper body dressing techniques of over head, then arms through to decrease pain and unilateral shoulder flexion >90*. Instruction on the benefits of utilizing B UEs during functional tasks i.e. opening the fridge, stepping into the tub. Therapeutic Exercises:   Patient instructed on the benefits and demonstrated cardiac exercises while sitting edge of bed with Stand-by assistance. Instructed and indicated understanding on how to progress reps, sets against gravity, pacing through progressive muscle strengthening standing based on surgeon clearance for more weight in prep for basic and instrumental ADLs. Instruction on the use of household items in place of weights as needed. CARDIAC   EXERCISE    Sets    Reps    Active  Active Assist    Passive  Self ROM    Comments    Shoulder flexion  1  5   [x]                            []                             []                             []                                Scapular elevation  1  5  [x]                             []                              []                             []                                Scapular retraction  1  5  [x]                             []                             []                             []                                    Pain:  Patient reports receiving pain medication prior to session    Activity Tolerance:   Fair, SpO2 stable on RA and observed SOB with activity, oxygen remained at 90% or above throughout but does report SOB with activity    After treatment patient left in no apparent distress:   Supine in bed, Call bell within reach, Caregiver / family present and Side rails x 3    COMMUNICATION/COLLABORATION:   The patients plan of care was discussed with: Physical therapist and Registered nurse.      Suzy Ibrahim OTR/L  Time Calculation: 23 mins

## 2022-04-19 NOTE — PROGRESS NOTES
Problem: Mobility Impaired (Adult and Pediatric)  Goal: *Acute Goals and Plan of Care (Insert Text)  Description: FUNCTIONAL STATUS PRIOR TO ADMISSION: Patient was independent and active without use of DME.    HOME SUPPORT PRIOR TO ADMISSION: The patient lived with his wife but did not require assist.    Physical Therapy Goals  Weekly Reassessment 4/15/2022 (goals progressed)  1. Patient will move from supine to sit and sit to supine , scoot up and down, and roll side to side in bed with head of bed flat with supervision/set-up within 7 days. 2.  Patient will perform sit to/from stand from chair height with supervision/set-up within 7 days. 3.  Patient will ambulate 300 feet stand by assist within 7 days. 4.  Patient will ascend/descend 6 stairs with handrail(s) with minimal assistance/contact guard assist within 14 days. 5.  Patient will perform cardiac exercises per protocol with supervision/set-up within 7 days. 6.  Patient will verbally and functionally recall 3/3 sternal precautions within 7 days. 7.  Patient will perform power exchange for power module to/from battery with stand by assist within 7 days. Initiated 4/8/2022  1. Patient will move from supine to sit and sit to supine , scoot up and down, and roll side to side in bed with supervision/set-up within 7 days. 2.  Patient will perform sit to/from stand with supervision/set-up within 7 days. 3.  Patient will ambulate 50 feet with least restrictive assistive device and minimal assistance/contact guard assist within 7 days. 4.  Patient will ascend/descend 6 stairs with handrail(s) with minimal assistance/contact guard assist within 14 days. 5.  Patient will perform cardiac exercises per protocol with supervision/set-up within 7 days. 6.  Patient will verbally and functionally recall 3/3 sternal precautions within 7 days.   7.  Patient will perform power exchange for power module to/from battery with minimal assistance within 7 days.        Outcome: Progressing Towards Goal  PHYSICAL THERAPY TREATMENT  Patient: Holli Navarro (80 y.o. male)  Date: 4/19/2022  Diagnosis: HFrEF (heart failure with reduced ejection fraction) ([facility]) [I50.20] <principal problem not specified>  Procedure(s) (LRB):  REDO STERNOTOMY; RIGHT GROIN CUTDOWN FOR CANNULATION;  INSERTION OF LEFT VENTRICULAR ASSIST DEVICE (HMIII); AORTIC VALVE CLOSURE CHEL BY DR. Paz Spurling. (N/A) 14 Days Post-Op  Precautions: Fall (mindful movement, LVAD)  Chart, physical therapy assessment, plan of care and goals were reviewed. ASSESSMENT  Patient continues with skilled PT services and is progressing towards goals. Patient received in chair, agreeable to therapy. Participated in toileting, change overs, and ambulation. Today, limited by SOB, fatigue, and with increased fluid draining from chest tube with clots, RN present aware, and managing. Required seated rest after 80 ft ambulation due to BRAND (new for patient as he was previously ambulatory without much SOB). Impulsive with sitting, mod A to lower and one major LOB with mod A to recover. Returned to room and chair via w/c. Did very well with change over to batteries this date. Current Level of Function Impacting Discharge (mobility/balance): min-mod Ax1    Other factors to consider for discharge: limited by BRAND          PLAN :  Patient continues to benefit from skilled intervention to address the above impairments. Continue treatment per established plan of care. to address goals. Recommendation for discharge: (in order for the patient to meet his/her long term goals)  Physical therapy at least 2 days/week in the home     This discharge recommendation:  Has been made in collaboration with the attending provider and/or case management    IF patient discharges home will need the following DME: none       SUBJECTIVE:   Patient stated I'm just out of breath.     OBJECTIVE DATA SUMMARY:       Critical Behavior:  Neurologic State: Alert  Orientation Level: Oriented X4  Cognition: Appropriate decision making,Appropriate for age attention/concentration,Appropriate safety awareness,Follows commands  Functional Mobility Training:    Transfers:  Sit to Stand: Minimum assistance  Stand to Sit: Moderate assistance  Balance:  Sitting: Intact  Standing: Impaired; Without support  Standing - Static: Good  Standing - Dynamic : Fair  Ambulation/Gait Training:  Distance (ft): 80 Feet (ft)  Ambulation - Level of Assistance: Minimal assistance; Moderate assistance  Gait Abnormalities: Decreased step clearance; Path deviations  Base of Support: Center of gravity altered  Speed/Charlene: Pace decreased (<100 feet/min)  Step Length: Right shortened;Left shortened    VAD power transition  Patient performed switchover from power module to battery. Completed the following tasks:   Independent Verbal cues Physical assist Comments   Don holster vest   x D/t lines   Check batteries x      Check  x      Ensure  clipped onto stabilization belt x      Position batteries in clips x      Disconnect power leads x      Insert power lead into battery x      Mount [employer] batteries in holster  x      Secure batteries with velcro and clips x            Pain Rating:  Pain with coughing     Activity Tolerance:   Fair and SpO2 stable on RA but BRAND      After treatment patient left in no apparent distress:   Sitting in chair, Call bell within reach, and Caregiver / family present    COMMUNICATION/COLLABORATION:   The patients plan of care was discussed with: Occupational therapist and Registered nurse.      Bozena Smyth PT, DPT   Time Calculation: 26 mins

## 2022-04-19 NOTE — PROGRESS NOTES
Critical Care Note      Cardiac surgery was called for the evaluation and management of: NYHA class IV A/C systolic heart failure, inotrope dependence, right ventricular dysfunction      The critical nature of the patient's condition includes the following: The patient is at imminent risk of death from NYHA class IV A/C systolic heart failure, inotrope dependence, and right ventricular dysfunction. [facility] is at imminent risk of needing escalation of MCS and right sided MCS     Critical care diagnoses that are being treated:  NYHA class IV A/C systolic heart failure / inotrope dependence  Right ventricular dysfunction      HPI: Patient is a 68 yo with severe NYHA class IV A/C systolic heart failure, inotrope dependence, and right ventricular dysfunction.  The patient is at imminent risk of death from NYHA class IV A/C systolic heart failure, inotrope dependence, and right ventricular dysfunction.  He is at imminent risk of needing escalation of MCS and right sided MCS.  Asked to evaluate for permanent LVAD     NYHA class IV A/C systolic heart failure / Cardiogenic shock   Remains on Dobutamine  NT pro-BNP 5K  Pulmonary edema on CXR  CVP 10-15, PA 30/20's  Cardiac index 2's  MAPs 70-80s  Discussed with HF team      Right ventricular dysfunction   Continues on Dobutamine   LFTs 90's  Likely some hepatic congestion  Lactic acid 1.0      Intake/Output Summary (Last 24 hours) at 4/19/2022 1727  Last data filed at 4/19/2022 1200  Gross per 24 hour   Intake 496 ml   Output 2625 ml   Net -2129 ml      Visit Vitals  BP (!) 106/91   Pulse 76   Temp 98.8 °F (37.1 °C)   Resp 19   Ht 6' 1\" (1.854 m)   Wt 196 lb 3.4 oz (89 kg)   SpO2 96%   BMI 25.89 kg/m²      CXR Results  (Last 48 hours)               04/19/22 0444  XR CHEST PORT Final result    Impression:  1. No change pulmonary edema   2. Persistent bilateral pleural effusions left greater than right which have   increased.  The pleural effusions cause atelectasis in the lower lobes left   greater than right. Narrative:  EXAM: XR CHEST PORT       INDICATION: Heart failure       COMPARISON: 4/18/2022       FINDINGS: A portable AP radiograph of the chest was obtained at 0419 hours. The   patient is on a cardiac monitor. There is no change in the cardiomegaly. LVAD   and ICD remain in place. PICC line overlies the SVC. There continues to be bilateral pleural effusions left greater than right which   have mildly increased. There continues to be pulmonary vascular congestion mild   pulmonary edema which is basically unchanged. 04/18/22 0500  XR CHEST PORT Final result    Impression:  Cardiomegaly, pleural effusions, pulmonary vascular congestion. Narrative:  PORTABLE CHEST RADIOGRAPH/S: 4/18/2022 5:00 AM       INDICATION: Heart failure. COMPARISON: 4/17/2022, 4/16/2022. TECHNIQUE: Portable frontal semiupright radiograph/s of the chest.       FINDINGS:    Passive atelectasis is associated with layering bilateral pleural effusions. There is pulmonary vascular congestion without overt interstitial edema. The   central airways are patent. No pneumothorax. The heart is enlarged, even given   portable technique. Post CABG, pacemaker/AICD placement, and LVAD placement. A   right PICC is in appropriate position.                     LVAD   Pump Speed (RPM): 4850  Pump Flow (LPM): 2.7  MAP: 78 (L radial doppler)  PI (Pulsitility Index): 10.2  Power: 3.2   Test: Yes  Back Up  at Bedside & Labeled: Yes  Power Module Test: Yes  Driveline Site Care: No  Driveline Dressing: Clean, Dry, and Intact  MAP in Therapeutic Range (Outpatient): Yes  Testing  Alarms Reviewed: Yes  Back up SC speed: 4800  Back up Low Speed Limit: 4400  Emergency Equipment with Patient?: Yes  Emergency procedures reviewed?: Yes  Drive line site inspected?: No  Drive line intergrity inspected?: Yes  Drive line dressing changed?: No      Total critical care time - 30 minutes (CPT 83102)      I personally spent the above critical care time. This is time spent at this critically ill patient's bedside / unit / floor actively involved in patient care as well as the coordination of care. This does not include any procedural time which has been billed separately. This does not include any NP/PA patient care time. I had a face to face encounter with the patient, reviewed and interpreted patient data including clinical events, labs, images, vital signs, I/O's, and examined patient. I have discussed the case and the plan and management of the patient's care with the consulting services and bedside nurses. This patient has a high probability of imminent, clinically significant deterioration, which requires the highest level of preparedness to intervene urgently. I participated in the decision-making and personally managed or directed the management of life and organ supporting interventions to treat or prevent imminent deterioration. This patient has a critical illness or injury that is acutely impairing one or more vital organ systems such that there is a high probability of imminent or life threatening deterioration in the patient's condition. This patient requires high complexity medical decision making to assess, manipulate, and support vital organ system failure. After stabilization, this patient still requires management to prevent further life / organ threatening deterioration.

## 2022-04-19 NOTE — PROGRESS NOTES
Follow up visit in Room 4332. Patient appeared to be sleeping in his chair during the visit His granddaughter Graham Moe was present at beside. She shared about her close relationship with her grandfather and that she looks at him as more of a father figure than a grandfather. At this time, there were no spiritual need noted. Chart review. Provided ministry of presence, empathic listening; and facilitated a relationship of compassion and support. Visited by: Nishi Azevedo.  Ray [address] paging Service [phone] (2025)

## 2022-04-19 NOTE — PROGRESS NOTES
Occupational Therapy  04/19/22     Patient currently on OT caseload. OT tx attempted at 2:13 PM however nursing present in room to do a sterile dressing change at this time. Will continue to follow patient and attempt OT at a later time.      Thank you,  Tasha Mueller OTR/L

## 2022-04-20 ENCOUNTER — APPOINTMENT (OUTPATIENT)
Dept: GENERAL RADIOLOGY | Age: 77
DRG: 001 | End: 2022-04-20
Attending: NURSE PRACTITIONER
Payer: MEDICARE

## 2022-04-20 LAB
ALBUMIN SERPL-MCNC: 2.6 G/DL (ref 3.5–5)
ALBUMIN/GLOB SERPL: 0.8 {RATIO} (ref 1.1–2.2)
ALP SERPL-CCNC: 114 U/L (ref 45–117)
ALT SERPL-CCNC: 20 U/L (ref 12–78)
ANION GAP SERPL CALC-SCNC: 7 MMOL/L (ref 5–15)
APTT PPP: 43 SEC (ref 22.1–31)
APTT PPP: >130 SEC (ref 22.1–31)
AST SERPL-CCNC: 53 U/L (ref 15–37)
BILIRUB SERPL-MCNC: 1 MG/DL (ref 0.2–1)
BNP SERPL-MCNC: 4465 PG/ML
BUN SERPL-MCNC: 16 MG/DL (ref 6–20)
BUN/CREAT SERPL: 21 (ref 12–20)
CALCIUM SERPL-MCNC: 7.8 MG/DL (ref 8.5–10.1)
CHLORIDE SERPL-SCNC: 101 MMOL/L (ref 97–108)
CO2 SERPL-SCNC: 25 MMOL/L (ref 21–32)
CREAT SERPL-MCNC: 0.76 MG/DL (ref 0.7–1.3)
ERYTHROCYTE [DISTWIDTH] IN BLOOD BY AUTOMATED COUNT: 21.4 % (ref 11.5–14.5)
ERYTHROCYTE [SEDIMENTATION RATE] IN BLOOD: 46 MM/HR (ref 0–20)
FERRITIN SERPL-MCNC: 292 NG/ML (ref 26–388)
GLOBULIN SER CALC-MCNC: 3.1 G/DL (ref 2–4)
GLUCOSE BLD STRIP.AUTO-MCNC: 153 MG/DL (ref 65–117)
GLUCOSE BLD STRIP.AUTO-MCNC: 185 MG/DL (ref 65–117)
GLUCOSE BLD STRIP.AUTO-MCNC: 190 MG/DL (ref 65–117)
GLUCOSE SERPL-MCNC: 123 MG/DL (ref 65–100)
HCT VFR BLD AUTO: 24.5 % (ref 36.6–50.3)
HGB BLD-MCNC: 7.9 G/DL (ref 12.1–17)
INR PPP: 2.3 (ref 0.9–1.1)
INR PPP: 2.4 (ref 0.9–1.1)
IRON SATN MFR SERPL: 10 % (ref 20–50)
IRON SERPL-MCNC: 13 UG/DL (ref 35–150)
LDH SERPL L TO P-CCNC: 196 U/L (ref 85–241)
MAGNESIUM SERPL-MCNC: 2 MG/DL (ref 1.6–2.4)
MCH RBC QN AUTO: 26.2 PG (ref 26–34)
MCHC RBC AUTO-ENTMCNC: 32.2 G/DL (ref 30–36.5)
MCV RBC AUTO: 81.1 FL (ref 80–99)
NRBC # BLD: 0 K/UL (ref 0–0.01)
NRBC BLD-RTO: 0 PER 100 WBC
PLATELET # BLD AUTO: 256 K/UL (ref 150–400)
PMV BLD AUTO: 9.7 FL (ref 8.9–12.9)
POTASSIUM SERPL-SCNC: 3.6 MMOL/L (ref 3.5–5.1)
PROT SERPL-MCNC: 5.7 G/DL (ref 6.4–8.2)
PROTHROMBIN TIME: 22.4 SEC (ref 9–11.1)
PROTHROMBIN TIME: 23.5 SEC (ref 9–11.1)
RBC # BLD AUTO: 3.02 M/UL (ref 4.1–5.7)
SERVICE CMNT-IMP: ABNORMAL
SODIUM SERPL-SCNC: 133 MMOL/L (ref 136–145)
THERAPEUTIC RANGE,PTTT: ABNORMAL SECS (ref 58–77)
THERAPEUTIC RANGE,PTTT: ABNORMAL SECS (ref 58–77)
TIBC SERPL-MCNC: 125 UG/DL (ref 250–450)
WBC # BLD AUTO: 8.2 K/UL (ref 4.1–11.1)

## 2022-04-20 PROCEDURE — 85730 THROMBOPLASTIN TIME PARTIAL: CPT

## 2022-04-20 PROCEDURE — 83615 LACTATE (LD) (LDH) ENZYME: CPT

## 2022-04-20 PROCEDURE — 74011250637 HC RX REV CODE- 250/637: Performed by: INTERNAL MEDICINE

## 2022-04-20 PROCEDURE — 74011250637 HC RX REV CODE- 250/637: Performed by: NURSE PRACTITIONER

## 2022-04-20 PROCEDURE — 93750 INTERROGATION VAD IN PERSON: CPT | Performed by: INTERNAL MEDICINE

## 2022-04-20 PROCEDURE — 80053 COMPREHEN METABOLIC PANEL: CPT

## 2022-04-20 PROCEDURE — 74011000250 HC RX REV CODE- 250: Performed by: NURSE PRACTITIONER

## 2022-04-20 PROCEDURE — APPSS60 APP SPLIT SHARED TIME 46-60 MINUTES: Performed by: NURSE PRACTITIONER

## 2022-04-20 PROCEDURE — 82962 GLUCOSE BLOOD TEST: CPT

## 2022-04-20 PROCEDURE — 85610 PROTHROMBIN TIME: CPT

## 2022-04-20 PROCEDURE — 65660000001 HC RM ICU INTERMED STEPDOWN

## 2022-04-20 PROCEDURE — 85652 RBC SED RATE AUTOMATED: CPT

## 2022-04-20 PROCEDURE — 93750 INTERROGATION VAD IN PERSON: CPT | Performed by: NURSE PRACTITIONER

## 2022-04-20 PROCEDURE — 99291 CRITICAL CARE FIRST HOUR: CPT | Performed by: THORACIC SURGERY (CARDIOTHORACIC VASCULAR SURGERY)

## 2022-04-20 PROCEDURE — 74011250636 HC RX REV CODE- 250/636: Performed by: NURSE PRACTITIONER

## 2022-04-20 PROCEDURE — 93750 INTERROGATION VAD IN PERSON: CPT | Performed by: THORACIC SURGERY (CARDIOTHORACIC VASCULAR SURGERY)

## 2022-04-20 PROCEDURE — 36415 COLL VENOUS BLD VENIPUNCTURE: CPT

## 2022-04-20 PROCEDURE — 97530 THERAPEUTIC ACTIVITIES: CPT

## 2022-04-20 PROCEDURE — 74011000258 HC RX REV CODE- 258: Performed by: NURSE PRACTITIONER

## 2022-04-20 PROCEDURE — 97535 SELF CARE MNGMENT TRAINING: CPT

## 2022-04-20 PROCEDURE — 99233 SBSQ HOSP IP/OBS HIGH 50: CPT | Performed by: INTERNAL MEDICINE

## 2022-04-20 PROCEDURE — 74011250636 HC RX REV CODE- 250/636: Performed by: THORACIC SURGERY (CARDIOTHORACIC VASCULAR SURGERY)

## 2022-04-20 PROCEDURE — 85027 COMPLETE CBC AUTOMATED: CPT

## 2022-04-20 PROCEDURE — 83880 ASSAY OF NATRIURETIC PEPTIDE: CPT

## 2022-04-20 PROCEDURE — 83540 ASSAY OF IRON: CPT

## 2022-04-20 PROCEDURE — 83735 ASSAY OF MAGNESIUM: CPT

## 2022-04-20 PROCEDURE — 97116 GAIT TRAINING THERAPY: CPT

## 2022-04-20 PROCEDURE — 82728 ASSAY OF FERRITIN: CPT

## 2022-04-20 PROCEDURE — 71045 X-RAY EXAM CHEST 1 VIEW: CPT

## 2022-04-20 PROCEDURE — P9047 ALBUMIN (HUMAN), 25%, 50ML: HCPCS | Performed by: NURSE PRACTITIONER

## 2022-04-20 RX ORDER — POTASSIUM CHLORIDE 29.8 MG/ML
20 INJECTION INTRAVENOUS
Status: COMPLETED | OUTPATIENT
Start: 2022-04-20 | End: 2022-04-20

## 2022-04-20 RX ORDER — POTASSIUM CHLORIDE 750 MG/1
20 TABLET, FILM COATED, EXTENDED RELEASE ORAL DAILY
Status: DISCONTINUED | OUTPATIENT
Start: 2022-04-21 | End: 2022-04-29

## 2022-04-20 RX ADMIN — ACETAMINOPHEN 650 MG: 325 TABLET ORAL at 15:45

## 2022-04-20 RX ADMIN — ALBUMIN (HUMAN) 12.5 G: 0.25 INJECTION, SOLUTION INTRAVENOUS at 17:13

## 2022-04-20 RX ADMIN — HYDRALAZINE HYDROCHLORIDE 50 MG: 50 TABLET, FILM COATED ORAL at 04:38

## 2022-04-20 RX ADMIN — SUCRALFATE 1 G: 1 TABLET ORAL at 11:35

## 2022-04-20 RX ADMIN — BUMETANIDE 2 MG: 0.25 INJECTION, SOLUTION INTRAMUSCULAR; INTRAVENOUS at 17:13

## 2022-04-20 RX ADMIN — WARFARIN SODIUM 8 MG: 2 TABLET ORAL at 17:13

## 2022-04-20 RX ADMIN — GABAPENTIN 200 MG: 100 CAPSULE ORAL at 21:16

## 2022-04-20 RX ADMIN — DOBUTAMINE HYDROCHLORIDE 6 MCG/KG/MIN: 400 INJECTION INTRAVENOUS at 23:12

## 2022-04-20 RX ADMIN — COLCHICINE 0.6 MG: 0.6 TABLET, FILM COATED ORAL at 08:42

## 2022-04-20 RX ADMIN — AMIODARONE HYDROCHLORIDE 200 MG: 200 TABLET ORAL at 17:13

## 2022-04-20 RX ADMIN — SODIUM CHLORIDE, PRESERVATIVE FREE 10 ML: 5 INJECTION INTRAVENOUS at 15:38

## 2022-04-20 RX ADMIN — POTASSIUM CHLORIDE 20 MEQ: 400 INJECTION, SOLUTION INTRAVENOUS at 13:52

## 2022-04-20 RX ADMIN — SUCRALFATE 1 G: 1 TABLET ORAL at 15:40

## 2022-04-20 RX ADMIN — SUCRALFATE 1 G: 1 TABLET ORAL at 21:16

## 2022-04-20 RX ADMIN — HYDRALAZINE HYDROCHLORIDE 50 MG: 50 TABLET, FILM COATED ORAL at 12:29

## 2022-04-20 RX ADMIN — GABAPENTIN 200 MG: 100 CAPSULE ORAL at 08:42

## 2022-04-20 RX ADMIN — MIRTAZAPINE 7.5 MG: 15 TABLET, FILM COATED ORAL at 21:16

## 2022-04-20 RX ADMIN — SODIUM CHLORIDE, PRESERVATIVE FREE 10 ML: 5 INJECTION INTRAVENOUS at 05:20

## 2022-04-20 RX ADMIN — ACETAMINOPHEN 650 MG: 325 TABLET ORAL at 21:27

## 2022-04-20 RX ADMIN — HEPARIN SODIUM 2000 UNITS: 1000 INJECTION INTRAVENOUS; SUBCUTANEOUS at 00:03

## 2022-04-20 RX ADMIN — AMIODARONE HYDROCHLORIDE 200 MG: 200 TABLET ORAL at 08:42

## 2022-04-20 RX ADMIN — IRON SUCROSE 200 MG: 20 INJECTION, SOLUTION INTRAVENOUS at 15:40

## 2022-04-20 RX ADMIN — ALBUMIN (HUMAN) 12.5 G: 0.25 INJECTION, SOLUTION INTRAVENOUS at 08:41

## 2022-04-20 RX ADMIN — SODIUM CHLORIDE, PRESERVATIVE FREE 10 ML: 5 INJECTION INTRAVENOUS at 21:16

## 2022-04-20 RX ADMIN — POLYETHYLENE GLYCOL 3350 17 G: 17 POWDER, FOR SOLUTION ORAL at 08:41

## 2022-04-20 RX ADMIN — POTASSIUM CHLORIDE 10 MEQ: 750 TABLET, EXTENDED RELEASE ORAL at 09:37

## 2022-04-20 RX ADMIN — Medication 3 MG: at 21:27

## 2022-04-20 RX ADMIN — ASPIRIN 81 MG CHEWABLE TABLET 81 MG: 81 TABLET CHEWABLE at 08:42

## 2022-04-20 RX ADMIN — CEFEPIME 2 G: 2 INJECTION, POWDER, FOR SOLUTION INTRAVENOUS at 04:38

## 2022-04-20 RX ADMIN — FAMOTIDINE 20 MG: 20 TABLET ORAL at 08:42

## 2022-04-20 RX ADMIN — BUMETANIDE 2 MG: 0.25 INJECTION, SOLUTION INTRAMUSCULAR; INTRAVENOUS at 08:41

## 2022-04-20 RX ADMIN — POTASSIUM CHLORIDE 20 MEQ: 400 INJECTION, SOLUTION INTRAVENOUS at 12:24

## 2022-04-20 RX ADMIN — FAMOTIDINE 20 MG: 20 TABLET ORAL at 21:16

## 2022-04-20 RX ADMIN — LOSARTAN POTASSIUM 25 MG: 25 TABLET, FILM COATED ORAL at 08:42

## 2022-04-20 RX ADMIN — SUCRALFATE 1 G: 1 TABLET ORAL at 06:50

## 2022-04-20 RX ADMIN — HYDRALAZINE HYDROCHLORIDE 50 MG: 50 TABLET, FILM COATED ORAL at 21:16

## 2022-04-20 RX ADMIN — SODIUM CHLORIDE 3 ML/HR: 9 INJECTION, SOLUTION INTRAVENOUS at 05:13

## 2022-04-20 NOTE — PROGRESS NOTES
Problem: Self Care Deficits Care Plan (Adult)  Goal: *Acute Goals and Plan of Care (Insert Text)  Description: FUNCTIONAL STATUS PRIOR TO ADMISSION: pt lives with wife, independent prior    HOME SUPPORT: The patient lived with wife but did not require assist.    Occupational Therapy Goals  Goals reviewed and remain appropriate 4/18/2022  Initiated 4/8/2022  1. Patient will perform ADLs standing 5 mins without fatigue or LOB with supervision/set-up within 7 day(s). 2.  Patient will perform lower body ADLs with minimal assistance/contact guard assist within 7 day(s). 3.  Patient will perform upper body ADLs with minimal assistance/contact guard assist within 7 day(s). 4.  Patient will perform toilet transfers with minimal assistance/contact guard assist within 7 day(s). 5.  Patient will perform all aspects of toileting with minimal assistance/contact guard assist within 7 day(s). 6.  Patient will participate in cardiac/sternal upper extremity therapeutic exercise/activities to increase independence with ADLs with supervision/set-up for 5 minutes within 7 day(s). 7.  Patient will perform VAD switchover routine as part of ADL routine (including batteries<>batteries, battery clip<>battery clip, mock SC<>SC) with minimal assistance/contact guard assist within 7 days. Outcome: Progressing Towards Goal   OCCUPATIONAL THERAPY TREATMENT  Patient: Enrico Hess (81 y.o. male)  Date: 4/20/2022  Diagnosis: HFrEF (heart failure with reduced ejection fraction) ([facility]) [I50.20] <principal problem not specified>  Procedure(s) (LRB):  REDO STERNOTOMY; RIGHT GROIN CUTDOWN FOR CANNULATION;  INSERTION OF LEFT VENTRICULAR ASSIST DEVICE (HMIII); AORTIC VALVE CLOSURE CHEL BY DR. Almeida Bachelor. (N/A) 15 Days Post-Op  Precautions: Fall (mindful movement, LVAD)  Chart, occupational therapy assessment, plan of care, and goals were reviewed. ASSESSMENT  Patient continues with skilled OT services and is progressing towards goals. Patient seated in recliner throughout session, patient received on battery power and agreeable to perform switchover to power module. Prior to switchover reviewed low battery warning and patient demonstrated switching batteries in both clips. Reviewed clip malfunction alarm and procedure verbally with patient who verbalized understanding. See below for details on switchover to power module. Patient min A to doff holster vest after switching to power module. Patient then requesting to stay in recliner and rest at this time, noted further ambulation distance with PT earlier in AM. Patient would benefit from skilled OT services during admission to improve independence with self care and functional mobility/transfers. Recommend discharge to [facility] at this time. Patient is verbalizing understanding of mindful-based movements (\"move in the tube\") principles of keeping UEs proximal to ribcage to prevent lateral pull on the sternum during load-bearing activities with verbal cues required for compliance. Current Level of Function Impacting Discharge (ADLs): supervision for switchover from batteries to power module, min A upper extremity dressing    Other factors to consider for discharge: prior level of function independent, support from wife, new LVAD         PLAN :  Patient continues to benefit from skilled intervention to address the above impairments. Continue treatment per established plan of care to address goals.     Recommend with staff: up to chair for all meals and throughout day as tolerated, BSC for toileting, ADLs at sink as able    Recommend next OT session: reaching high, discuss items needed for emergency bag    Recommendation for discharge: (in order for the patient to meet his/her long term goals)  Occupational therapy at least 2 days/week in the home     This discharge recommendation:  Has been made in collaboration with the attending provider and/or case management    IF patient discharges home will need the following DME: shower chair       SUBJECTIVE:   Patient stated I would like to rest for a little bit.     OBJECTIVE DATA SUMMARY:   Cognitive/Behavioral Status:  Neurologic State: Alert  Orientation Level: Oriented X4                Functional Mobility and Transfers for ADLs:  Bed Mobility:       Transfers:  Sit to Stand: Minimum assistance          Balance:  Sitting: Intact  Standing: Impaired; Without support  Standing - Static: Good  Standing - Dynamic : Fair    ADL Intervention:                      Upper Body Dressing Assistance  Front Opened Shirt: Minimum assistance (doff holster vest)                   Patient instructed and educated on mindful movement principles based on Move in The Tube concept to include maintaining bilateral elbows close to rib cage when performing any load-bearing activity such as getting in/out of bed, pushing up from a chair, opening a door, or lifting a box. Patient was given a handout with diagrams of each correct/incorrect method of performing each of the above tasks. Patient instructed on the ability to utilize upper extremities outside the tube when doing any non-load bearing activity such as washing hair/body, brushing teeth, retrieving clothing items, or scratching your back. Patient encouraged to also perform upper extremity exercises \"outside of the tube\" to prevent scar tissue formation around sternal incision site. Patient instructed no asymmetrical reaching over head to ensure B UEs when shoulders >90* i.e. reaching in cabinets and dressing. Instruction on upper body dressing techniques of over head, then arms through to decrease pain and unilateral shoulder flexion >90*. Instruction on the benefits of utilizing B UEs during functional tasks i.e. opening the fridge, stepping into the tub.          Pain:  None reported    Activity Tolerance:   Fair    After treatment patient left in no apparent distress:   Sitting in chair and Call bell within reach    COMMUNICATION/COLLABORATION:   The patients plan of care was discussed with: Physical therapist and Registered nurse.      Rod Patton OTR/L  Time Calculation: 11 mins

## 2022-04-20 NOTE — PROGRESS NOTES
Problem: Mobility Impaired (Adult and Pediatric)  Goal: *Acute Goals and Plan of Care (Insert Text)  Description: FUNCTIONAL STATUS PRIOR TO ADMISSION: Patient was independent and active without use of DME.    HOME SUPPORT PRIOR TO ADMISSION: The patient lived with his wife but did not require assist.    Physical Therapy Goals  Weekly Reassessment 4/15/2022 (goals progressed)  1. Patient will move from supine to sit and sit to supine , scoot up and down, and roll side to side in bed with head of bed flat with supervision/set-up within 7 days. 2.  Patient will perform sit to/from stand from chair height with supervision/set-up within 7 days. 3.  Patient will ambulate 300 feet stand by assist within 7 days. 4.  Patient will ascend/descend 6 stairs with handrail(s) with minimal assistance/contact guard assist within 14 days. 5.  Patient will perform cardiac exercises per protocol with supervision/set-up within 7 days. 6.  Patient will verbally and functionally recall 3/3 sternal precautions within 7 days. 7.  Patient will perform power exchange for power module to/from battery with stand by assist within 7 days. Initiated 4/8/2022  1. Patient will move from supine to sit and sit to supine , scoot up and down, and roll side to side in bed with supervision/set-up within 7 days. 2.  Patient will perform sit to/from stand with supervision/set-up within 7 days. 3.  Patient will ambulate 50 feet with least restrictive assistive device and minimal assistance/contact guard assist within 7 days. 4.  Patient will ascend/descend 6 stairs with handrail(s) with minimal assistance/contact guard assist within 14 days. 5.  Patient will perform cardiac exercises per protocol with supervision/set-up within 7 days. 6.  Patient will verbally and functionally recall 3/3 sternal precautions within 7 days.   7.  Patient will perform power exchange for power module to/from battery with minimal assistance within 7 days.      Outcome: Progressing Towards Goal  PHYSICAL THERAPY TREATMENT  Patient: Emile Olszewski (77 y.o. male)  Date: 4/20/2022  Diagnosis: HFrEF (heart failure with reduced ejection fraction) ([facility]) [I50.20] <principal problem not specified>  Procedure(s) (LRB):  REDO STERNOTOMY; RIGHT GROIN CUTDOWN FOR CANNULATION;  INSERTION OF LEFT VENTRICULAR ASSIST DEVICE (HMIII); AORTIC VALVE CLOSURE CHEL BY DR. Anca Camargo. (N/A) 15 Days Post-Op  Precautions: Fall (mindful movement, LVAD)  Chart, physical therapy assessment, plan of care and goals were reviewed. ASSESSMENT  Patient continues with skilled PT services and is progressing towards goals. Patient received seated in recliner chair, agreeable to therapy and on battery power. Overall min A to stand from low chair (says he will have a lift chair at home) and to steady with one LOB during gait training. Less SOB today but still mild SOB on second gait trial and balance improved with wearing his tennis shoes on second trial. Participated in learning low battery scenario as below. Cues given to just change battery and not clip as well. SpO2 stable on room air and RR 33 with gait. Recommend HHPT. Current Level of Function Impacting Discharge (mobility/balance): min A    Other factors to consider for discharge: SOB         PLAN :  Patient continues to benefit from skilled intervention to address the above impairments. Continue treatment per established plan of care. to address goals. Recommendation for discharge: (in order for the patient to meet his/her long term goals)  Physical therapy at least 2 days/week in the home     This discharge recommendation:  Has been made in collaboration with the attending provider and/or case management    IF patient discharges home will need the following DME: none       SUBJECTIVE:   Patient stated I think I do better with my shoes.     OBJECTIVE DATA SUMMARY:       Critical Behavior:  Neurologic State: Alert  Orientation Level: Oriented X4  Cognition: Appropriate decision making,Appropriate for age attention/concentration,Appropriate safety awareness,Follows commands  Functional Mobility Training:  Transfers:  Sit to Stand: Minimum assistance  Stand to Sit: Minimum assistance  Balance:  Sitting: Intact  Standing: Impaired; Without support  Standing - Static: Good  Standing - Dynamic : Fair  Ambulation/Gait Training:  Distance (ft): 130 Feet (ft) (x2)  Ambulation - Level of Assistance: Contact guard assistance;Minimal assistance  Gait Abnormalities: Decreased step clearance; Path deviations  Base of Support: Center of gravity altered  Speed/Charlene: Pace decreased (<100 feet/min)  Step Length: Left shortened;Right shortened    VAD power transition battery to battery:    Patient performed switchover from battery to battery for low battery scenario. Required cues to only switch battery and no need to switch clip if low battery only. Completed the following tasks:   Independent Verbal cues Physical assist Comments   Check batteries x      Removed batteries from clips x      Position batteries in clips x            Pain Rating:  Did not c/o pain    Activity Tolerance:   Good and observed SOB with activity      After treatment patient left in no apparent distress:   Sitting in chair and Call bell within reach    COMMUNICATION/COLLABORATION:   The patients plan of care was discussed with: Occupational therapist and Registered nurse.      Renae Goodwin PT, DPT   Time Calculation: 23 mins

## 2022-04-20 NOTE — PROGRESS NOTES
[facility] in [place], [place]    Attempted to meet with patient for ongoing education. Dressing change in progress. Will continue to follow for ongoing education and support. John Choi RN.

## 2022-04-20 NOTE — PROGRESS NOTES
[facility] in [address], [place]  Inpatient Progress Note      Patient name: Bree Dill  Patient : 1945  Patient MRN: [id number]  Consulting MD: Kaci Xie MD  Date of service: 22    REASON FOR REFERRAL:  Management of LVAD    PLAN OF CARE:  · 69 y.o. male with severe ischemic cardiomyopathy, LVEF 15-20% s/p HM3 LVAD implant as DT on 22 by Dr. Breann Andrade c/b intraoperative bleeding requiring multiple blood products and subsequent RV failure with severe TR on high dose dobutamine IV and sternal wound infection on antibiotics  · Plan for remainder of hospitalization includes:  · Surveillance weekly echos to determine if RV function improves and TV coapts  · Completion of course of antibiotics for sternal wound infection  · Diuresis and removal of chest tubes when ready  · PT/OT/Nutrition    PLAN:  TTE shows severe TR but improved RV function; continue to optimize medically   Continue speed 4800rpms, low speed 4400  Continue dobutamine 6 mcg/kg/min- no weaning at this time due to severe TR  Continue hydralazine 50 mg PO q8h   Continue  amiodarone to 200 mg PO twice daily  Continue losartan 25 mg PO daily   Continue Bumex 2mg IV BID  Venofer 200mg x 2 doses   Completed course of cefepime for sternal incision   Diuretics PRN CVP > 14 mmHg (monitor via PICC)   Monitor MAP via doppler  Driveline dressing changes daily for now until 5/3/22  Wound culture negative   NANDINI hose    Needs EKG weekly on   Continue warfarin, goal INR 2-3; 8mg tonight   Stop Heparin gtt  ASA 81mg daily  Continue colchicine for pericarditis   Continue gabapentin 200 mg PO TID   Toradol q6h PRN and before PT/OT; OK with Dr. Breann Andrade, renal function stable  IV fentanyl prn (pt with hx anaphylaxis to oxycodone)  Continue bowel regimen as needed   Pulmonary toilet  Monitor CT on water seal   CPAP QHS  Advanced care plan forms on file   Strict I/O, daily weights, Na+ restricted diet   Continue VAD education   Equipment ordered    LAST 24 HOURS:  No acute events overnight  Net neg -4.5L  VSS and LVAD flows stable- no acute alarms on VAD  Creatinine stable 0.76  T Bili 1.0   PBNP up to 4400  Improved CT outpt       REVIEW OF SYSTEMS:  Review of Systems   Constitutional: Negative for chills, fever, malaise/fatigue and weight loss. Respiratory: Negative for cough and shortness of breath. Cardiovascular: Negative for chest pain, palpitations, orthopnea and leg swelling. Gastrointestinal: Negative for abdominal pain, nausea and vomiting. Neurological: Negative for dizziness and weakness. Psychiatric/Behavioral: Negative. LIFE GOALS:  Patient's personal goals include: getting stronger and going home soon  Important upcoming milestones or family events: The patient identifies the following as important for living well: being independent, being at home, enjoying family time        OBJECTIVE:  PHYSICAL EXAM:  Visit Vitals  /70 (BP 1 Location: Left upper arm, BP Patient Position: At rest)   Pulse 83   Temp 98.3 °F (36.8 °C)   Resp 21   Ht 6' 1\" (1.854 m)   Wt 190 lb 11.2 oz (86.5 kg)   SpO2 99%   BMI 25.16 kg/m²       LVAD INTERROGATION:  Device interrogated in person  Device function normal, normal flow, no events  LVAD   Pump Speed (RPM): 4800  Pump Flow (LPM): 3.3  MAP: 78 (L radial doppler)  PI (Pulsitility Index): 6.3  Power: 3.2   Test: Yes  Back Up  at Bedside & Labeled: Yes  Power Module Test: Yes  Driveline Site Care: No  Driveline Dressing: Clean, Dry, and Intact  MAP in Therapeutic Range (Outpatient): Yes  Testing  Alarms Reviewed: Yes  Back up SC speed: 4800  Back up Low Speed Limit: 4400  Emergency Equipment with Patient?: Yes  Emergency procedures reviewed?: Yes  Drive line site inspected?: No  Drive line intergrity inspected?: Yes  Drive line dressing changed?: No      Physical Exam  Vitals reviewed.    Constitutional: General: He is not in acute distress. Appearance: Normal appearance. Cardiovascular:      Rate and Rhythm: Normal rate and regular rhythm. Comments: LVAD Hum noted on auscultation  Pulmonary:      Effort: Pulmonary effort is normal. No respiratory distress. Abdominal:      General: Bowel sounds are normal. There is no distension. Palpations: Abdomen is soft. Tenderness: There is no abdominal tenderness. Musculoskeletal:      Right lower le+ Pitting Edema present. Left lower le+ Pitting Edema present. Skin:     General: Skin is warm and dry. Capillary Refill: Capillary refill takes less than 2 seconds. Neurological:      General: No focal deficit present. Mental Status: He is alert and oriented to person, place, and time. Psychiatric:         Mood and Affect: Mood normal.         Behavior: Behavior normal.         Thought Content: Thought content normal.         Judgment: Judgment normal.            CARDIAC IMAGING AND DIAGNOSTICS REVIEWED:  Echo 22- LVEF 15-20%, severe TR, TAPSE 0.5cm, RVIDd 6cm   Echo (22)    Left Ventricle: Left ventricle size is normal. Moderately increased wall thickness. Findings consistent with concentric remodeling. Severe global hypokinesis present. Severely reduced left ventricular systolic function with a visually estimated EF of 10 -15%. 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   Aortic Valve: Moderate sclerosis of the aortic valve cusp.   Mitral Valve: Moderately thickened leaflet. Mild transvalvular regurgitation.   Tricuspid Valve: Severe transvalvular regurgitation.   Right Atrium: Right atrium is severely dilated.     Echo (3/2/22)   · LV 10-15%  · Mod-severe MR      Echo (21):  · LV 15-20%.    · Mod-severe, MR, mod-TR     Echo (21)  · LV: Estimated LVEF is 20 - 25%. Normal wall thickness. Mildly dilated left ventricle.  Severely and globally reduced systolic function. Severe (grade 4) left ventricular diastolic dysfunction. · LA: Severely dilated left atrium. · RA: Severely dilated right atrium. · MV: Moderate mitral valve regurgitation is present. · TV: Moderate tricuspid valve regurgitation is present. · AO: Mild aortic root dilatation. · RV: Pacer/ICD present. · LVED 6.2cm     EKG (5/20/21) SB with 1degree AV block, QRS 116ms     LHC (5/24/21) Native Coronaries: LM - moderate to severe distal disease 50%. % prox occluded (), heavily calcified, LCx: 80% proximal stenosis. OM1 is  (seen filling faintly via collaterals). OM2 99% stenosis. RCA: 100% proximal occluded.  LIMA to LAD: patent, supplies only a diagonal branch (probably D2). The LAD distal to the bifurcation is 100% occluded () and fills via right to left collaterals off the SVG to RCA. SVG to R-PDA: patent with moderate diffuse irregularities but no obstructive disease in the graft.    No appealing interventional targets.      NST as above     ICD Interrogation (11/12/2021) [employer] VVI, thoracic impedence trending up, no events, normal device function and good battery  ICD interrogation (5/12/21) [employer] single lead, no events, normal device function and good battery     HEMODYNAMICS:  RHC 5/24/21 CI 1.97, PA 33/9/17, RA 1, PCWP 6- off milrinone   CPEST not done     Patient underwent a 6 minute walk test 11/12/2021     6 Min Walk Report 11/12/2021 7/6/2021 5/20/2021   (PRE) HR 88 98 74   (PRE) O2 Sat 100 - 99   (POST)  - 81   (POST) O2 Sat 99 98% on Ra 99   Distance in Meters 386.58 - 1158. 24       OTHER IMAGING:  CXR (6/17/21) clear  CT 5/21/21 1. Irregular pulmonary nodule in the left upper lobe measuring 2 cm, suspicious for primary pulmonary malignancy. 2. Additional 6 mm left upper lobe pulmonary nodule. 3. Severe calcific atherosclerosis of the coronary arteries and abdominal aorta. No aneurysm. 4. Cardiomegaly.  5. No acute abnormality within the chest, abdomen, or pelvis. LABORATORY RESULTS:     Labs Latest Ref Rng & Units 4/20/2022 4/19/2022 4/18/2022 4/17/2022 4/16/2022 4/15/2022 4/14/2022   WBC 4.1 - 11.1 K/uL 8.2 9.0 9.6 9.3 8.8 9.2 -   RBC 4.10 - 5.70 M/uL 3.02(L) 3.09(L) 3.10(L) 2.97(L) 2.94(L) 3.17(L) -   Hemoglobin 12.1 - 17.0 g/dL 7. 9(L) 8.0(L) 8. 1(L) 7. 7(L) 7. 7(L) 8.2(L) 8.2(L)   Hematocrit 36.6 - 50.3 % 24. 5(L) 25. 3(L) 25. 1(L) 23. 9(L) 23. 6(L) 25. 5(L) 25. 5(L)   MCV 80.0 - 99.0 FL 81.1 81.9 81.0 80.5 80.3 80.4 -   Platelets 033 - 819 K/uL 256 240 243 205 203 208 -   Lymphocytes 12 - 49 % - - - - - - -   Monocytes 5 - 13 % - - - - - - -   Eosinophils 0 - 7 % - - - - - - -   Basophils 0 - 1 % - - - - - - -   Bands 0 - 6 % - - - - - - -   Albumin 3.5 - 5.0 g/dL 2. 6(L) 2. 6(L) 2. 6(L) 2. 6(L) 2. 7(L) 2. 9(L) -   Calcium 8.5 - 10.1 MG/DL 7. 8(L) 8.0(L) 8. 3(L) 8.4(L) 8.4(L) 8. 3(L) -   Glucose 65 - 100 mg/dL 123(H) 191(H) 153(H) 174(H) 118(H) 141(H) -   BUN 6 - 20 MG/DL 16 18 26(H) 23(H) 28(H) 26(H) -   Creatinine 0.70 - 1.30 MG/DL 0.76 0.80 0.89 0.84 0.85 0.87 -   Sodium 136 - 145 mmol/L 133(L) 131(L) 127(L) 128(L) 128(L) 129(L) -   Potassium 3.5 - 5.1 mmol/L 3.6 4.4 5.0 4.8 4.8 4.6 -   TSH 0.36 - 3.74 uIU/mL - - - - - - -   LDH 85 - 241 U/L 196 206 192 196 201 192 -   Some recent data might be hidden     Lab Results   Component Value Date/Time    TSH 2.26 04/10/2022 03:00 AM    TSH 1.68 03/01/2022 11:50 AM    TSH 1.47 05/26/2021 10:44 PM    TSH 1.97 05/20/2021 04:28 PM    TSH 1.41 02/09/2021 03:05 PM    TSH 1.740 08/14/2018 09:58 AM    TSH 1.710 10/18/2017 12:00 AM         HISTORY:  PAST MEDICAL HISTORY:  Past Medical History:   Diagnosis Date    CAD (coronary artery disease) '90 '97, '13 x 2    MI[facility] code [facility] in 2013 [facility]    Calculus of kidney     Colonic polyps     Congestive heart failure, unspecified     Diabetes ([facility] [address]     Gastritis     Hypercholesterolemia     Sleep apnea        PAST SURGICAL HISTORY:  Past Surgical History: Procedure Laterality Date    COLONOSCOPY  04/06/2011 16, due 21    COLONOSCOPY N/A 3/2/2022    COLONOSCOPY    :- performed by Dyllan Mcgee [facility] at [facility] ENDOSCOPY    ENDOSCOPY, COLON, DIAGNOSTIC      11, due 16    HX CORONARY ARTERY BYPASS GRAFT  8/22/12    x 4 vessel by MISSY Ramirez    HX HEENT      LASIK    HX PACEMAKER PLACEMENT  1/30/13    [place] CARDIAC SURG PROCEDURE UNLIST  2012    x 4 vessels       FAMILY HISTORY:  Family History   Problem Relation Age of Onset    Heart Disease Mother         MI    Heart Disease Father         CAD & PVD    Lung Disease Father     Cancer Father         lung    Diabetes Maternal Grandmother     Heart Disease Other         CAD    Stroke Sister        SOCIAL HISTORY:  Social History     Socioeconomic History    Marital status:    Tobacco Use    Smoking status: Never Smoker    Smokeless tobacco: Never Used   Vaping Use    Vaping Use: Never used   Substance and Sexual Activity    Alcohol use:  Yes     Alcohol/week: 0.0 standard drinks     Comment:  VERY RARE    Drug use: No       ALLERGY:  Allergies   Allergen Reactions    Oxycodone Anaphylaxis    Pcn [Penicillins] Hives        CURRENT MEDICATIONS:    Current Facility-Administered Medications:     warfarin (COUMADIN) tablet 8 mg, 8 mg, Oral, ONCE, Terry, Danica B, NP    [START ON 4/21/2022] iron sucrose (VENOFER) 200 mg in 0.9% sodium chloride 100 mL IVPB, 200 mg, IntraVENous, DAILY, Terry, Danica B, NP    [START ON 4/21/2022] potassium chloride SR (KLOR-CON 10) tablet 20 mEq, 20 mEq, Oral, DAILY, Terry, Danica B, NP    guaiFENesin (ROBITUSSIN) 100 mg/5 mL oral liquid 100 mg, 100 mg, Oral, Q4H PRN, Tsering Camacho MD, 100 mg at 04/19/22 1248    losartan (COZAAR) tablet 25 mg, 25 mg, Oral, DAILY, Terry, Danica B, NP, 25 mg at 04/20/22 0842    bumetanide (BUMEX) injection 2 mg, 2 mg, IntraVENous, BID, Terry, Danica B, NP, 2 mg at 04/20/22 0841    polyethylene glycol (MIRALAX) packet 17 g, 17 g, Oral, DAILY, Genella Nose B, NP, 17 g at 04/20/22 0841    senna-docusate (PERICOLACE) 8.6-50 mg per tablet 1 Tablet, 1 Tablet, Oral, DAILY PRN, Rekha Fly, NP    oxymetazoline (AFRIN) 0.05 % nasal spray 2 Spray, 2 Spray, Both Nostrils, BID PRN, Genella Nose B, NP    hydrALAZINE (APRESOLINE) tablet 50 mg, 50 mg, Oral, Q8H, Polliard, Polo Gerda T, NP, 50 mg at 04/20/22 1788    amiodarone (CORDARONE) tablet 200 mg, 200 mg, Oral, BID, Polliard, Polo Gerda T, NP, 200 mg at 04/20/22 5676    colchicine tablet 0.6 mg, 0.6 mg, Oral, DAILY, Polliard, Polo Gerda T, NP, 0.6 mg at 04/20/22 1787    alteplase (CATHFLO) 1 mg in sterile water (preservative free) 1 mL injection, 1 mg, InterCATHeter, PRN, Claus Cabrera MD, 1 mg at 04/16/22 1149    bisacodyL (DULCOLAX) suppository 10 mg, 10 mg, Rectal, DAILY PRN, Aydee Guan MD, 10 mg at 04/12/22 1533    gabapentin (NEURONTIN) capsule 200 mg, 200 mg, Oral, TID, Polliard, Polo Gerda T, NP, 200 mg at 04/20/22 0842    albumin human 25% (BUMINATE) solution 12.5 g, 12.5 g, IntraVENous, BID, Polliard, Polo Gerda T, NP, 12.5 g at 04/20/22 0841    famotidine (PEPCID) tablet 20 mg, 20 mg, Oral, Q12H, Aydee Guan MD, 20 mg at 04/20/22 0842    bumetanide (BUMEX) injection 1 mg, 1 mg, IntraVENous, Q6H PRN, Aydee Guan MD, 1 mg at 04/10/22 1234    mirtazapine (REMERON) tablet 7.5 mg, 7.5 mg, Oral, QHS, Terry, Danica B, NP, 7.5 mg at 04/19/22 2142    lidocaine 4 % patch 1 Patch, 1 Patch, TransDERmal, Q24H, Terry, Danica B, NP, 1 Patch at 04/18/22 1343    Warfarin NP/MD dosing, , Other, PRN, Terry, Danica B, NP    melatonin tablet 3 mg, 3 mg, Oral, QHS PRN, Breann Meo MD, 3 mg at 04/10/22 0251    hydrALAZINE (APRESOLINE) 20 mg/mL injection 10 mg, 10 mg, IntraVENous, Q6H PRN, Genella Nose B, NP, 10 mg at 04/18/22 7337    aspirin chewable tablet 81 mg, 81 mg, Oral, DAILY, Genella Nose B, NP, 81 mg at 04/20/22 6326   hydrALAZINE (APRESOLINE) 20 mg/mL injection 5 mg, 5 mg, IntraVENous, Q6H PRN, Thor Lena B, NP, 5 mg at 04/13/22 3867    sodium chloride (NS) flush 5-40 mL, 5-40 mL, IntraVENous, Q8H, Vneecia Wong NP, 10 mL at 04/20/22 0520    sodium chloride (NS) flush 5-40 mL, 5-40 mL, IntraVENous, PRN, Mirian Wong, NP    0.9% sodium chloride infusion, 9 mL/hr, IntraVENous, CONTINUOUS, Venecia Wong NP, Last Rate: 3 mL/hr at 04/20/22 0513, 3 mL/hr at 04/20/22 0513    acetaminophen (TYLENOL) tablet 650 mg, 650 mg, Oral, Q6H PRN, Marvin, Mirian Inman, NP, 650 mg at 04/18/22 1344    naloxone (NARCAN) injection 0.4 mg, 0.4 mg, IntraVENous, PRN, Mirian Wong NP    ondansetron [facility]) injection 4 mg, 4 mg, IntraVENous, Q4H PRN, Venecia Wong NP, 4 mg at 04/11/22 2139    albuterol-ipratropium (DUO-NEB) 2.5 MG-0.5 MG/3 ML, 3 mL, Nebulization, Q6H PRN, Mirian Wong NP    midazolam (VERSED) injection 1 mg, 1 mg, IntraVENous, Q1H PRN, Mirian Wong NP    ELECTROLYTE REPLACEMENT NOTE: Nurse to review Serum Potassium and Magnesuim levels and Initiate Electrolyte Replacement Protocol as needed, 1 Each, Other, PRN, Mirian Wong NP    insulin regular (NOVOLIN R, HUMULIN R) 100 Units in 0.9% sodium chloride 100 mL infusion, 0-50 Units/hr, IntraVENous, TITRATE, Mirian Wong NP, Stopped at 04/09/22 1619    glucose chewable tablet 16 g, 4 Tablet, Oral, PRN, Mirian Wong NP    glucagon (GLUCAGEN) injection 1 mg, 1 mg, IntraMUSCular, PRN, Mirian Wong NP    insulin lispro (HUMALOG) injection, , SubCUTAneous, AC&LIVIER, Mirian Wong NP, 1 Units at 04/19/22 2141    dextrose 10 % infusion 0-250 mL, 0-250 mL, IntraVENous, PRN, Mirian Wong NP, Last Rate: 999 mL/hr at 04/07/22 2145, 45 mL at 04/07/22 2145    sucralfate (CARAFATE) tablet 1 g, 1 g, Oral, AC&HS, Venecia Wong NP, 1 g at 04/20/22 1135    0.45% sodium chloride infusion, 10 mL/hr, IntraVENous, Katherine Crespo MD, Stopped at 04/10/22 0730    fentaNYL citrate (PF) injection 25 mcg, 25 mcg, IntraVENous, Q2H PRN, Geni Wong NP, 25 mcg at 04/19/22 1047    DOBUTamine (DOBUTREX) 1,000 mg/250 mL (4,000 mcg/mL) infusion, 0-10 mcg/kg/min, IntraVENous, TITRATE, Geni Wong NP, Last Rate: 6.8 mL/hr at 04/20/22 0802, 6 mcg/kg/min at 04/20/22 0802    PATIENT CARE TEAM:  Patient Care Team:  Chino Harding MD as PCP - General (Internal Medicine)  Chino Harding MD as PCP - [facility]  Debo Barajas MD as Physician (Cardiology)  Johnie Fields MD as Physician (Gastroenterology)  Pradip Dunne MD as Physician (Orthopedic Surgery)  Cortes Brian MD as Physician (Ophthalmology)  Ernesto Sandy MD (Cardiology)  Augustine Otoole MD (Cardiology)         Thank you for allowing us to participate in this patient's care. Severiano Child, NP   [facility] [address], [address]  Phone: [phone]    [facility] ATTENDING ADDENDUM    Patient was seen and examined in person. Data and notes were reviewed. I have discussed and agree with the plan as noted in the NP note above without further additions.     Ciarra Mendoza MD PhD  [facility]

## 2022-04-21 ENCOUNTER — APPOINTMENT (OUTPATIENT)
Dept: GENERAL RADIOLOGY | Age: 77
DRG: 001 | End: 2022-04-21
Attending: NURSE PRACTITIONER
Payer: MEDICARE

## 2022-04-21 LAB
ALBUMIN SERPL-MCNC: 2.5 G/DL (ref 3.5–5)
ALBUMIN/GLOB SERPL: 0.9 {RATIO} (ref 1.1–2.2)
ALP SERPL-CCNC: 112 U/L (ref 45–117)
ALT SERPL-CCNC: 22 U/L (ref 12–78)
ANION GAP SERPL CALC-SCNC: 9 MMOL/L (ref 5–15)
APTT PPP: 41.9 SEC (ref 22.1–31)
AST SERPL-CCNC: 58 U/L (ref 15–37)
BILIRUB SERPL-MCNC: 1 MG/DL (ref 0.2–1)
BNP SERPL-MCNC: 3312 PG/ML
BUN SERPL-MCNC: 18 MG/DL (ref 6–20)
BUN/CREAT SERPL: 22 (ref 12–20)
CALCIUM SERPL-MCNC: 7.9 MG/DL (ref 8.5–10.1)
CHLORIDE SERPL-SCNC: 100 MMOL/L (ref 97–108)
CO2 SERPL-SCNC: 26 MMOL/L (ref 21–32)
CREAT SERPL-MCNC: 0.83 MG/DL (ref 0.7–1.3)
ERYTHROCYTE [DISTWIDTH] IN BLOOD BY AUTOMATED COUNT: 21.4 % (ref 11.5–14.5)
ERYTHROCYTE [SEDIMENTATION RATE] IN BLOOD: 33 MM/HR (ref 0–20)
GLOBULIN SER CALC-MCNC: 2.9 G/DL (ref 2–4)
GLUCOSE BLD STRIP.AUTO-MCNC: 119 MG/DL (ref 65–117)
GLUCOSE BLD STRIP.AUTO-MCNC: 139 MG/DL (ref 65–117)
GLUCOSE BLD STRIP.AUTO-MCNC: 190 MG/DL (ref 65–117)
GLUCOSE BLD STRIP.AUTO-MCNC: 207 MG/DL (ref 65–117)
GLUCOSE SERPL-MCNC: 131 MG/DL (ref 65–100)
HCT VFR BLD AUTO: 23.5 % (ref 36.6–50.3)
HGB BLD-MCNC: 7.5 G/DL (ref 12.1–17)
INR PPP: 2.7 (ref 0.9–1.1)
LDH SERPL L TO P-CCNC: 168 U/L (ref 85–241)
MAGNESIUM SERPL-MCNC: 1.9 MG/DL (ref 1.6–2.4)
MCH RBC QN AUTO: 25.9 PG (ref 26–34)
MCHC RBC AUTO-ENTMCNC: 31.9 G/DL (ref 30–36.5)
MCV RBC AUTO: 81 FL (ref 80–99)
NRBC # BLD: 0 K/UL (ref 0–0.01)
NRBC BLD-RTO: 0 PER 100 WBC
PLATELET # BLD AUTO: 268 K/UL (ref 150–400)
PMV BLD AUTO: 9.7 FL (ref 8.9–12.9)
POTASSIUM SERPL-SCNC: 3.6 MMOL/L (ref 3.5–5.1)
PROT SERPL-MCNC: 5.4 G/DL (ref 6.4–8.2)
PROTHROMBIN TIME: 26.1 SEC (ref 9–11.1)
RBC # BLD AUTO: 2.9 M/UL (ref 4.1–5.7)
SERVICE CMNT-IMP: ABNORMAL
SODIUM SERPL-SCNC: 135 MMOL/L (ref 136–145)
THERAPEUTIC RANGE,PTTT: ABNORMAL SECS (ref 58–77)
WBC # BLD AUTO: 7 K/UL (ref 4.1–11.1)

## 2022-04-21 PROCEDURE — 83615 LACTATE (LD) (LDH) ENZYME: CPT

## 2022-04-21 PROCEDURE — P9047 ALBUMIN (HUMAN), 25%, 50ML: HCPCS | Performed by: NURSE PRACTITIONER

## 2022-04-21 PROCEDURE — 71045 X-RAY EXAM CHEST 1 VIEW: CPT

## 2022-04-21 PROCEDURE — 74011636637 HC RX REV CODE- 636/637: Performed by: NURSE PRACTITIONER

## 2022-04-21 PROCEDURE — 74011250636 HC RX REV CODE- 250/636: Performed by: NURSE PRACTITIONER

## 2022-04-21 PROCEDURE — 85610 PROTHROMBIN TIME: CPT

## 2022-04-21 PROCEDURE — 74011250637 HC RX REV CODE- 250/637: Performed by: NURSE PRACTITIONER

## 2022-04-21 PROCEDURE — 99233 SBSQ HOSP IP/OBS HIGH 50: CPT | Performed by: INTERNAL MEDICINE

## 2022-04-21 PROCEDURE — 74011250637 HC RX REV CODE- 250/637: Performed by: INTERNAL MEDICINE

## 2022-04-21 PROCEDURE — APPSS60 APP SPLIT SHARED TIME 46-60 MINUTES: Performed by: NURSE PRACTITIONER

## 2022-04-21 PROCEDURE — 74011000250 HC RX REV CODE- 250: Performed by: NURSE PRACTITIONER

## 2022-04-21 PROCEDURE — 97116 GAIT TRAINING THERAPY: CPT

## 2022-04-21 PROCEDURE — 80053 COMPREHEN METABOLIC PANEL: CPT

## 2022-04-21 PROCEDURE — 97535 SELF CARE MNGMENT TRAINING: CPT

## 2022-04-21 PROCEDURE — 97530 THERAPEUTIC ACTIVITIES: CPT

## 2022-04-21 PROCEDURE — 93750 INTERROGATION VAD IN PERSON: CPT | Performed by: INTERNAL MEDICINE

## 2022-04-21 PROCEDURE — 93750 INTERROGATION VAD IN PERSON: CPT | Performed by: NURSE PRACTITIONER

## 2022-04-21 PROCEDURE — 82962 GLUCOSE BLOOD TEST: CPT

## 2022-04-21 PROCEDURE — 83880 ASSAY OF NATRIURETIC PEPTIDE: CPT

## 2022-04-21 PROCEDURE — 83735 ASSAY OF MAGNESIUM: CPT

## 2022-04-21 PROCEDURE — 85730 THROMBOPLASTIN TIME PARTIAL: CPT

## 2022-04-21 PROCEDURE — 74011000258 HC RX REV CODE- 258: Performed by: NURSE PRACTITIONER

## 2022-04-21 PROCEDURE — 85027 COMPLETE CBC AUTOMATED: CPT

## 2022-04-21 PROCEDURE — 85652 RBC SED RATE AUTOMATED: CPT

## 2022-04-21 PROCEDURE — 93005 ELECTROCARDIOGRAM TRACING: CPT

## 2022-04-21 PROCEDURE — 36415 COLL VENOUS BLD VENIPUNCTURE: CPT

## 2022-04-21 PROCEDURE — 65660000001 HC RM ICU INTERMED STEPDOWN

## 2022-04-21 RX ORDER — KETOROLAC TROMETHAMINE 30 MG/ML
15 INJECTION, SOLUTION INTRAMUSCULAR; INTRAVENOUS
Status: COMPLETED | OUTPATIENT
Start: 2022-04-21 | End: 2022-04-21

## 2022-04-21 RX ORDER — ROSUVASTATIN CALCIUM 10 MG/1
TABLET, COATED ORAL
Qty: 90 TABLET | Refills: 3 | Status: SHIPPED | OUTPATIENT
Start: 2022-04-21 | End: 2022-05-06

## 2022-04-21 RX ADMIN — FAMOTIDINE 20 MG: 20 TABLET ORAL at 20:27

## 2022-04-21 RX ADMIN — KETOROLAC TROMETHAMINE 15 MG: 30 INJECTION, SOLUTION INTRAMUSCULAR; INTRAVENOUS at 13:06

## 2022-04-21 RX ADMIN — MIRTAZAPINE 7.5 MG: 15 TABLET, FILM COATED ORAL at 23:06

## 2022-04-21 RX ADMIN — LOSARTAN POTASSIUM 25 MG: 25 TABLET, FILM COATED ORAL at 10:01

## 2022-04-21 RX ADMIN — SUCRALFATE 1 G: 1 TABLET ORAL at 12:42

## 2022-04-21 RX ADMIN — AMIODARONE HYDROCHLORIDE 200 MG: 200 TABLET ORAL at 17:29

## 2022-04-21 RX ADMIN — Medication 2 UNITS: at 23:07

## 2022-04-21 RX ADMIN — SUCRALFATE 1 G: 1 TABLET ORAL at 23:06

## 2022-04-21 RX ADMIN — BUMETANIDE 2 MG: 0.25 INJECTION, SOLUTION INTRAMUSCULAR; INTRAVENOUS at 17:30

## 2022-04-21 RX ADMIN — SODIUM CHLORIDE, PRESERVATIVE FREE 10 ML: 5 INJECTION INTRAVENOUS at 23:07

## 2022-04-21 RX ADMIN — GABAPENTIN 200 MG: 100 CAPSULE ORAL at 10:00

## 2022-04-21 RX ADMIN — HYDRALAZINE HYDROCHLORIDE 50 MG: 50 TABLET, FILM COATED ORAL at 12:42

## 2022-04-21 RX ADMIN — HYDRALAZINE HYDROCHLORIDE 50 MG: 50 TABLET, FILM COATED ORAL at 05:07

## 2022-04-21 RX ADMIN — ASPIRIN 81 MG CHEWABLE TABLET 81 MG: 81 TABLET CHEWABLE at 09:59

## 2022-04-21 RX ADMIN — BUMETANIDE 2 MG: 0.25 INJECTION, SOLUTION INTRAMUSCULAR; INTRAVENOUS at 10:26

## 2022-04-21 RX ADMIN — WARFARIN SODIUM 6 MG: 5 TABLET ORAL at 17:29

## 2022-04-21 RX ADMIN — SUCRALFATE 1 G: 1 TABLET ORAL at 07:12

## 2022-04-21 RX ADMIN — FAMOTIDINE 20 MG: 20 TABLET ORAL at 10:00

## 2022-04-21 RX ADMIN — ALBUMIN (HUMAN) 12.5 G: 0.25 INJECTION, SOLUTION INTRAVENOUS at 10:25

## 2022-04-21 RX ADMIN — GABAPENTIN 200 MG: 100 CAPSULE ORAL at 23:05

## 2022-04-21 RX ADMIN — COLCHICINE 0.6 MG: 0.6 TABLET, FILM COATED ORAL at 09:59

## 2022-04-21 RX ADMIN — POTASSIUM CHLORIDE 20 MEQ: 750 TABLET, EXTENDED RELEASE ORAL at 10:00

## 2022-04-21 RX ADMIN — AMIODARONE HYDROCHLORIDE 200 MG: 200 TABLET ORAL at 10:01

## 2022-04-21 RX ADMIN — ALBUMIN (HUMAN) 12.5 G: 0.25 INJECTION, SOLUTION INTRAVENOUS at 17:30

## 2022-04-21 RX ADMIN — POLYETHYLENE GLYCOL 3350 17 G: 17 POWDER, FOR SOLUTION ORAL at 10:02

## 2022-04-21 RX ADMIN — HYDRALAZINE HYDROCHLORIDE 50 MG: 50 TABLET, FILM COATED ORAL at 20:27

## 2022-04-21 RX ADMIN — ACETAMINOPHEN 650 MG: 325 TABLET ORAL at 13:06

## 2022-04-21 RX ADMIN — IRON SUCROSE 200 MG: 20 INJECTION, SOLUTION INTRAVENOUS at 14:12

## 2022-04-21 RX ADMIN — FENTANYL CITRATE 25 MCG: 0.05 INJECTION, SOLUTION INTRAMUSCULAR; INTRAVENOUS at 14:12

## 2022-04-21 RX ADMIN — GABAPENTIN 200 MG: 100 CAPSULE ORAL at 17:28

## 2022-04-21 RX ADMIN — FENTANYL CITRATE 25 MCG: 0.05 INJECTION, SOLUTION INTRAMUSCULAR; INTRAVENOUS at 11:00

## 2022-04-21 RX ADMIN — SODIUM CHLORIDE, PRESERVATIVE FREE 10 ML: 5 INJECTION INTRAVENOUS at 07:12

## 2022-04-21 RX ADMIN — SUCRALFATE 1 G: 1 TABLET ORAL at 17:29

## 2022-04-21 NOTE — PROGRESS NOTES
[facility] in [place], [place]  Inpatient Progress Note      Patient name: Etta Real  Patient : 1945  Patient MRN: [id number]  Consulting MD: Riana Moore MD  Date of service: 22    REASON FOR REFERRAL:  Management of LVAD    PLAN OF CARE:  · 69 y.o. male with severe ischemic cardiomyopathy, LVEF 15-20% s/p HM3 LVAD implant as DT on 22 by Dr. Melissa Nichole c/b intraoperative bleeding requiring multiple blood products and subsequent RV failure with severe TR on high dose dobutamine IV and sternal wound infection on antibiotics  · Plan for remainder of hospitalization includes:  · Surveillance weekly echos to determine if RV function improves and TV coapts  · Diuresis and removal of chest tubes when ready  · PT/OT/Nutrition    PLAN:  POD 15  TTE shows severe TR but improved RV function; continue to optimize medically   Repeat TTE on Monday   Continue speed 4800rpms, low speed 4400  Continue dobutamine 6 mcg/kg/min- no weaning at this time due to severe TR  Continue hydralazine 50 mg PO q8h   Continue  amiodarone 200 mg PO twice daily  Continue losartan 25 mg PO daily   Continue Bumex 2mg IV BID  S/p Venofer 200mg x 2 doses   Completed course of cefepime for sternal incision   Monitor MAP via doppler  Driveline dressing changes daily for now until 5/3/22  Wound culture negative   NANDINI hose    Needs EKG weekly on   Continue warfarin, goal INR 2-3; 6mg tonight   ASA 81mg daily  Continue colchicine for pericarditis   Continue gabapentin 200 mg PO TID   IV fentanyl prn (pt with hx anaphylaxis to oxycodone)  Continue bowel regimen as needed   Pulmonary toilet  Monitor CT on water seal   CPAP QHS  Advanced care plan forms on file   Strict I/O, daily weights, Na+ restricted diet   Continue VAD education   Equipment ordered, will set goal for home next week     LAST 24 HOURS:  No acute events overnight  Transferred to CVSU  Net neg -700L  VSS and LVAD flows stable- no acute alarms on VAD  Creatinine stable 0.83  T Bili 1.0   PBNP down to 3300  Improved CT outpt but remains elevated       REVIEW OF SYSTEMS:  Review of Systems   Constitutional: Negative for chills, fever, malaise/fatigue and weight loss. Respiratory: Negative for cough and shortness of breath. Cardiovascular: Negative for chest pain, palpitations, orthopnea and leg swelling. Gastrointestinal: Negative for abdominal pain, nausea and vomiting. Neurological: Negative for dizziness and weakness. Psychiatric/Behavioral: Negative. LIFE GOALS:  Patient's personal goals include: getting stronger and going home soon  Important upcoming milestones or family events: The patient identifies the following as important for living well: being independent, being at home, enjoying family time        OBJECTIVE:  PHYSICAL EXAM:  Visit Vitals  /88 (BP 1 Location: Left arm)   Pulse 85   Temp 98.5 °F (36.9 °C)   Resp 15   Ht 6' 1\" (1.854 m)   Wt 190 lb 11.2 oz (86.5 kg)   SpO2 96%   BMI 25.16 kg/m²       LVAD INTERROGATION:  Device interrogated in person  Device function normal, normal flow, no events  LVAD   Pump Speed (RPM): 4800  Pump Flow (LPM): 3.6  MAP: 78 (L radial doppler)  PI (Pulsitility Index): 4.9  Power: 3.2   Test: No  Back Up  at Bedside & Labeled: Yes  Power Module Test: No  Driveline Site Care: No  Driveline Dressing: Changed per order  MAP in Therapeutic Range (Outpatient): Yes  Testing  Alarms Reviewed: Yes  Back up SC speed: 4800  Back up Low Speed Limit: 4400  Emergency Equipment with Patient?: Yes  Emergency procedures reviewed?: Yes  Drive line site inspected?: Yes  Drive line intergrity inspected?: Yes  Drive line dressing changed?: No      Physical Exam  Vitals reviewed. Constitutional:       General: He is not in acute distress. Appearance: Normal appearance.    Cardiovascular:      Rate and Rhythm: Normal rate and regular rhythm. Comments: LVAD Hum noted on auscultation  Pulmonary:      Effort: Pulmonary effort is normal. No respiratory distress. Abdominal:      General: Bowel sounds are normal. There is no distension. Palpations: Abdomen is soft. Tenderness: There is no abdominal tenderness. Musculoskeletal:      Right lower le+ Pitting Edema present. Left lower le+ Pitting Edema present. Skin:     General: Skin is warm and dry. Capillary Refill: Capillary refill takes less than 2 seconds. Neurological:      General: No focal deficit present. Mental Status: He is alert and oriented to person, place, and time. Psychiatric:         Mood and Affect: Mood normal.         Behavior: Behavior normal.         Thought Content: Thought content normal.         Judgment: Judgment normal.            CARDIAC IMAGING AND DIAGNOSTICS REVIEWED:  Echo 22- LVEF 15-20%, severe TR, TAPSE 0.5cm, RVIDd 6cm   Echo (22)    Left Ventricle: Left ventricle size is normal. Moderately increased wall thickness. Findings consistent with concentric remodeling. Severe global hypokinesis present. Severely reduced left ventricular systolic function with a visually estimated EF of 10 -15%. 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   Aortic Valve: Moderate sclerosis of the aortic valve cusp.   Mitral Valve: Moderately thickened leaflet. Mild transvalvular regurgitation.   Tricuspid Valve: Severe transvalvular regurgitation.   Right Atrium: Right atrium is severely dilated.     Echo (3/2/22)   · LV 10-15%  · Mod-severe MR      Echo (21):  · LV 15-20%.    · Mod-severe, MR, mod-TR     Echo (21)  · LV: Estimated LVEF is 20 - 25%. Normal wall thickness. Mildly dilated left ventricle. Severely and globally reduced systolic function. Severe (grade 4) left ventricular diastolic dysfunction. · LA: Severely dilated left atrium.   · RA: Severely dilated right atrium. · MV: Moderate mitral valve regurgitation is present. · TV: Moderate tricuspid valve regurgitation is present. · AO: Mild aortic root dilatation. · RV: Pacer/ICD present. · LVED 6.2cm     EKG (5/20/21) SB with 1degree AV block, QRS 116ms     LHC (5/24/21) Native Coronaries: LM - moderate to severe distal disease 50%. % prox occluded (), heavily calcified, LCx: 80% proximal stenosis. OM1 is  (seen filling faintly via collaterals). OM2 99% stenosis. RCA: 100% proximal occluded.  LIMA to LAD: patent, supplies only a diagonal branch (probably D2). The LAD distal to the bifurcation is 100% occluded () and fills via right to left collaterals off the SVG to RCA. SVG to R-PDA: patent with moderate diffuse irregularities but no obstructive disease in the graft.    No appealing interventional targets.      NST as above     ICD Interrogation (11/12/2021) [employer] VVI, thoracic impedence trending up, no events, normal device function and good battery  ICD interrogation (5/12/21) [employer] single lead, no events, normal device function and good battery     HEMODYNAMICS:  RHC 5/24/21 CI 1.97, PA 33/9/17, RA 1, PCWP 6- off milrinone   CPEST not done     Patient underwent a 6 minute walk test 11/12/2021     6 Min Walk Report 11/12/2021 7/6/2021 5/20/2021   (PRE) HR 88 98 74   (PRE) O2 Sat 100 - 99   (POST)  - 81   (POST) O2 Sat 99 98% on Ra 99   Distance in Meters 386.58 - 1158. 24       OTHER IMAGING:  CXR (6/17/21) clear  CT 5/21/21 1. Irregular pulmonary nodule in the left upper lobe measuring 2 cm, suspicious for primary pulmonary malignancy. 2. Additional 6 mm left upper lobe pulmonary nodule. 3. Severe calcific atherosclerosis of the coronary arteries and abdominal aorta. No aneurysm. 4. Cardiomegaly. 5. No acute abnormality within the chest, abdomen, or pelvis.         LABORATORY RESULTS:     Labs Latest Ref Rng & Units 4/21/2022 4/20/2022 4/19/2022 4/18/2022 4/17/2022 4/16/2022 4/15/2022   WBC 4.1 - 11.1 K/uL 7.0 8.2 9.0 9.6 9.3 8.8 9.2   RBC 4.10 - 5.70 M/uL 2.90(L) 3.02(L) 3.09(L) 3.10(L) 2.97(L) 2.94(L) 3.17(L)   Hemoglobin 12.1 - 17.0 g/dL 7. 5(L) 7. 9(L) 8.0(L) 8. 1(L) 7. 7(L) 7. 7(L) 8.2(L)   Hematocrit 36.6 - 50.3 % 23. 5(L) 24. 5(L) 25. 3(L) 25. 1(L) 23. 9(L) 23. 6(L) 25. 5(L)   MCV 80.0 - 99.0 FL 81.0 81.1 81.9 81.0 80.5 80.3 80.4   Platelets 629 - 242 K/uL 268 256 240 243 205 203 208   Lymphocytes 12 - 49 % - - - - - - -   Monocytes 5 - 13 % - - - - - - -   Eosinophils 0 - 7 % - - - - - - -   Basophils 0 - 1 % - - - - - - -   Bands 0 - 6 % - - - - - - -   Albumin 3.5 - 5.0 g/dL 2. 5(L) 2. 6(L) 2. 6(L) 2. 6(L) 2. 6(L) 2. 7(L) 2. 9(L)   Calcium 8.5 - 10.1 MG/DL 7. 9(L) 7. 8(L) 8.0(L) 8. 3(L) 8.4(L) 8.4(L) 8. 3(L)   Glucose 65 - 100 mg/dL 131(H) 123(H) 191(H) 153(H) 174(H) 118(H) 141(H)   BUN 6 - 20 MG/DL 18 16 18 26(H) 23(H) 28(H) 26(H)   Creatinine 0.70 - 1.30 MG/DL 0.83 0.76 0.80 0.89 0.84 0.85 0.87   Sodium 136 - 145 mmol/L 135(L) 133(L) 131(L) 127(L) 128(L) 128(L) 129(L)   Potassium 3.5 - 5.1 mmol/L 3.6 3.6 4.4 5.0 4.8 4.8 4.6   TSH 0.36 - 3.74 uIU/mL - - - - - - -   LDH 85 - 241 U/L 168 196 206 192 196 201 192   Some recent data might be hidden     Lab Results   Component Value Date/Time    TSH 2.26 04/10/2022 03:00 AM    TSH 1.68 03/01/2022 11:50 AM    TSH 1.47 05/26/2021 10:44 PM    TSH 1.97 05/20/2021 04:28 PM    TSH 1.41 02/09/2021 03:05 PM    TSH 1.740 08/14/2018 09:58 AM    TSH 1.710 10/18/2017 12:00 AM         HISTORY:  PAST MEDICAL HISTORY:  Past Medical History:   Diagnosis Date    CAD (coronary artery disease) '90 '97, '13 x 2    MI[facility]    Calculus of kidney     Colonic polyps     Congestive heart failure, unspecified     Diabetes ([facility] [address]     Gastritis     Hypercholesterolemia     Sleep apnea        PAST SURGICAL HISTORY:  Past Surgical History:   Procedure Laterality Date    COLONOSCOPY  04/06/2011    16, due 21    COLONOSCOPY N/A 3/2/2022 COLONOSCOPY    :- [facility] ENDOSCOPY    ENDOSCOPY, COLON, DIAGNOSTIC      11, due 16    HX CORONARY ARTERY BYPASS GRAFT  8/22/12    x 4 vessel by MISSY LION    HX PACEMAKER PLACEMENT  1/30/13    SD CARDIAC SURG PROCEDURE UNLIST  2012    x 4 vessels       FAMILY HISTORY:  Family History   Problem Relation Age of Onset    Heart Disease Mother         MI    Heart Disease Father         CAD & PVD    Lung Disease Father     Cancer Father         lung    Diabetes Maternal Grandmother     Heart Disease Other         CAD    Stroke Sister        SOCIAL HISTORY:  Social History     Socioeconomic History    Marital status:    Tobacco Use    Smoking status: Never Smoker    Smokeless tobacco: Never Used   Vaping Use    Vaping Use: Never used   Substance and Sexual Activity    Alcohol use:  Yes     Alcohol/week: 0.0 standard drinks     Comment:  VERY RARE    Drug use: No       ALLERGY:  Allergies   Allergen Reactions    Oxycodone Anaphylaxis    Pcn [Penicillins] Hives        CURRENT MEDICATIONS:    Current Facility-Administered Medications:     warfarin (COUMADIN) tablet 6 mg, 6 mg, Oral, ONCE, Terry, Danica B, NP    iron sucrose (VENOFER) 200 mg in 0.9% sodium chloride 100 mL IVPB, 200 mg, IntraVENous, DAILY, Terry, Danica B, NP, Last Rate: 440 mL/hr at 04/20/22 1540, 200 mg at 04/20/22 1540    potassium chloride SR (KLOR-CON 10) tablet 20 mEq, 20 mEq, Oral, DAILY, Terry, Danica B, NP, 20 mEq at 04/21/22 1000    guaiFENesin (ROBITUSSIN) 100 mg/5 mL oral liquid 100 mg, 100 mg, Oral, Q4H PRN, Aletha Joseph MD, 100 mg at 04/19/22 1248    losartan (COZAAR) tablet 25 mg, 25 mg, Oral, DAILY, Terry, Danica B, NP, 25 mg at 04/21/22 1001    bumetanide (BUMEX) injection 2 mg, 2 mg, IntraVENous, BID, Terry, Danica B, NP, 2 mg at 04/21/22 1026    polyethylene glycol (MIRALAX) packet 17 g, 17 g, Oral, DAILY, Caden Vazquez NP, 17 g at 04/21/22 1002    senna-docusate (PERICOLACE) 8.6-50 mg per tablet 1 Tablet, 1 Tablet, Oral, DAILY PRN, Mann Fox NP    oxymetazoline (AFRIN) 0.05 % nasal spray 2 Spray, 2 Spray, Both Nostrils, BID PRN, Shaquille JACOBSON, NP    hydrALAZINE (APRESOLINE) tablet 50 mg, 50 mg, Oral, Q8H, Polliard, Nina Orr T, NP, 50 mg at 04/21/22 0507    amiodarone (CORDARONE) tablet 200 mg, 200 mg, Oral, BID, Polliard, Nina Orr T, NP, 200 mg at 04/21/22 1001    colchicine tablet 0.6 mg, 0.6 mg, Oral, DAILY, Polliard, Nina Caseyer WINSTON, NP, 0.6 mg at 04/21/22 0959    alteplase (CATHFLO) 1 mg in sterile water (preservative free) 1 mL injection, 1 mg, InterCATHeter, PRN, Fidelia Russell MD, 1 mg at 04/16/22 1149    bisacodyL (DULCOLAX) suppository 10 mg, 10 mg, Rectal, DAILY PRN, Alicia Wesley MD, 10 mg at 04/12/22 1533    gabapentin (NEURONTIN) capsule 200 mg, 200 mg, Oral, TID, Lucyiard, Nina Orr T, NP, 200 mg at 04/21/22 1000    albumin human 25% (BUMINATE) solution 12.5 g, 12.5 g, IntraVENous, BID, Lucyiard, Nina Caseyer T, NP, 12.5 g at 04/21/22 1025    famotidine (PEPCID) tablet 20 mg, 20 mg, Oral, Q12H, Alicia Wesley MD, 20 mg at 04/21/22 1000    bumetanide (BUMEX) injection 1 mg, 1 mg, IntraVENous, Q6H PRN, Alicia Wesley MD, 1 mg at 04/10/22 1234    mirtazapine (REMERON) tablet 7.5 mg, 7.5 mg, Oral, QHS, Danica Stewart NP, 7.5 mg at 04/20/22 2116    lidocaine 4 % patch 1 Patch, 1 Patch, TransDERmal, Q24H, Danica Stewart NP, 1 Patch at 04/18/22 1343    Warfarin NP/MD dosing, , Other, PRN, Danica Stewart NP    melatonin tablet 3 mg, 3 mg, Oral, QHS PRN, Barb Norton MD, 3 mg at 04/20/22 2127    hydrALAZINE (APRESOLINE) 20 mg/mL injection 10 mg, 10 mg, IntraVENous, Q6H PRN, Shaquille JACOBSON NP, 10 mg at 04/18/22 1809    aspirin chewable tablet 81 mg, 81 mg, Oral, DAILY, Shaquille JACOBSON NP, 81 mg at 04/21/22 0959    hydrALAZINE (APRESOLINE) 20 mg/mL injection 5 mg, 5 mg, IntraVENous, Q6H PRN, Orlando JACOBSON NP, 5 mg at 04/13/22 8276    sodium chloride (NS) flush 5-40 mL, 5-40 mL, IntraVENous, Q8H, Veronica Wong NP, 10 mL at 04/21/22 0712    sodium chloride (NS) flush 5-40 mL, 5-40 mL, IntraVENous, PRN, Ashley Wong NP    0.9% sodium chloride infusion, 9 mL/hr, IntraVENous, CONTINUOUS, Veronica Wong NP, Last Rate: 3 mL/hr at 04/20/22 0513, 3 mL/hr at 04/20/22 0513    acetaminophen (TYLENOL) tablet 650 mg, 650 mg, Oral, Q6H PRN, Ashley Wong NP, 650 mg at 04/20/22 2127    naloxone (NARCAN) injection 0.4 mg, 0.4 mg, IntraVENous, PRN, Ashley Wong NP    [facility] injection 4 mg, 4 mg, IntraVENous, Q4H PRN, Veronica Wong NP, 4 mg at 04/11/22 2139    albuterol-ipratropium (DUO-NEB) 2.5 MG-0.5 MG/3 ML, 3 mL, Nebulization, Q6H PRN, Ashley Wong NP    midazolam (VERSED) injection 1 mg, 1 mg, IntraVENous, Q1H PRN, Ashley Wong NP    ELECTROLYTE REPLACEMENT NOTE: Nurse to review Serum Potassium and Magnesuim levels and Initiate Electrolyte Replacement Protocol as needed, 1 Each, Other, PRN, Ashley Wong NP    insulin regular (NOVOLIN R, HUMULIN R) 100 Units in 0.9% sodium chloride 100 mL infusion, 0-50 Units/hr, IntraVENous, TITRATE, Ashley Wong NP, Stopped at 04/09/22 1619    glucose chewable tablet 16 g, 4 Tablet, Oral, PRN, Ashley Wong NP    glucagon (GLUCAGEN) injection 1 mg, 1 mg, IntraMUSCular, PRN, Ashley Wong NP    insulin lispro (HUMALOG) injection, , SubCUTAneous, AC&HS, Ashley Wong NP, 1 Units at 04/19/22 2141    dextrose 10 % infusion 0-250 mL, 0-250 mL, IntraVENous, PRN, Ashley Wong NP, Last Rate: 999 mL/hr at 04/07/22 2145, 45 mL at 04/07/22 2145    sucralfate (CARAFATE) tablet 1 g, 1 g, Oral, AC&HS, Veronica Wong NP, 1 g at 04/21/22 9430    0.45% sodium chloride infusion, 10 mL/hr, IntraVENous, CONTINUOUS, Silvina Najera MD, Stopped at 04/10/22 4457   fentaNYL citrate (PF) injection 25 mcg, 25 mcg, IntraVENous, Q2H PRN, Larissa Wong NP, 25 mcg at 04/21/22 1100    DOBUTamine (DOBUTREX) 1,000 mg/250 mL (4,000 mcg/mL) infusion, 0-10 mcg/kg/min, IntraVENous, TITRATE, Larissa Wong NP, Last Rate: 6.8 mL/hr at 04/20/22 2312, 6 mcg/kg/min at 04/20/22 2312    PATIENT CARE TEAM:  Patient Care Team:  Charito Alba MD as PCP - General (Internal Medicine)  Charito Alba MD as PCP - [facility]  Surinder Sotut MD as Physician (Cardiology)  Romelia Daniel MD as Physician (Gastroenterology)  Waymon Cockayne, MD as Physician (Orthopedic Surgery)  Jeramie Zacarias MD as Physician (Ophthalmology)  Ryanne Hancock MD (Cardiology)  Ximena Real MD (Cardiology)         Thank you for allowing us to participate in this patient's care. Rafy Torres NP   [facility] [address], [address]  Phone: [phone]      [facility] ATTENDING ADDENDUM    Patient was seen and examined in person. Data and notes were reviewed. I have discussed and agree with the plan as noted in the NP note above without further additions.     Jagdish Villanueva MD PhD  Nicolas Whitaker [phone]

## 2022-04-21 NOTE — PROGRESS NOTES
Problem: Mobility Impaired (Adult and Pediatric)  Goal: *Acute Goals and Plan of Care (Insert Text)  Description: FUNCTIONAL STATUS PRIOR TO ADMISSION: Patient was independent and active without use of DME.    HOME SUPPORT PRIOR TO ADMISSION: The patient lived with his wife but did not require assist.    Physical Therapy Goals  Weekly Reassessment 4/15/2022 (goals progressed)  1. Patient will move from supine to sit and sit to supine , scoot up and down, and roll side to side in bed with head of bed flat with supervision/set-up within 7 days. 2.  Patient will perform sit to/from stand from chair height with supervision/set-up within 7 days. 3.  Patient will ambulate 300 feet stand by assist within 7 days. 4.  Patient will ascend/descend 6 stairs with handrail(s) with minimal assistance/contact guard assist within 14 days. 5.  Patient will perform cardiac exercises per protocol with supervision/set-up within 7 days. 6.  Patient will verbally and functionally recall 3/3 sternal precautions within 7 days. 7.  Patient will perform power exchange for power module to/from battery with stand by assist within 7 days. Initiated 4/8/2022  1. Patient will move from supine to sit and sit to supine , scoot up and down, and roll side to side in bed with supervision/set-up within 7 days. 2.  Patient will perform sit to/from stand with supervision/set-up within 7 days. 3.  Patient will ambulate 50 feet with least restrictive assistive device and minimal assistance/contact guard assist within 7 days. 4.  Patient will ascend/descend 6 stairs with handrail(s) with minimal assistance/contact guard assist within 14 days. 5.  Patient will perform cardiac exercises per protocol with supervision/set-up within 7 days. 6.  Patient will verbally and functionally recall 3/3 sternal precautions within 7 days.   7.  Patient will perform power exchange for power module to/from battery with minimal assistance within 7 days.              Outcome: Progressing Towards Goal       PHYSICAL THERAPY TREATMENT  Patient: Juan Manuel Rea (89 y.o. male)  Date: 4/21/2022  Diagnosis: HFrEF (heart failure with reduced ejection fraction) ([facility]) [I50.20] <principal problem not specified>  Procedure(s) (LRB):  REDO STERNOTOMY; RIGHT GROIN CUTDOWN FOR CANNULATION;  INSERTION OF LEFT VENTRICULAR ASSIST DEVICE (HMIII); AORTIC VALVE CLOSURE CHEL BY DR. Fran Kilgore. (N/A) 16 Days Post-Op  Precautions: Fall (mindful movement, LVAD)  Chart, physical therapy assessment, plan of care and goals were reviewed. ASSESSMENT  Patient continues with skilled PT services and is progressing towards goals. Pt agreeable with gait. Pt had shoes donned to assist with balance. Pt had mild LOB with gait especially with fatigue. Will continue to progress gait tolerance  and begin stair training in preparation for discharge    Current Level of Function Impacting Discharge (mobility/balance): min A/CGA    Other factors to consider for discharge: activity tolerance, decrease balance, LVAD         PLAN :  Patient continues to benefit from skilled intervention to address the above impairments. Continue treatment per established plan of care. to address goals. Recommendation for discharge: (in order for the patient to meet his/her long term goals)  Physical therapy at least 2 days/week in the home AND ensure assist and/or supervision for safety with mobility as needed    This discharge recommendation:  Has not yet been discussed the attending provider and/or case management    IF patient discharges home will need the following DME: none       SUBJECTIVE:   Patient stated This is heavy .  about vest with batteries and      OBJECTIVE DATA SUMMARY:   Cardiac diagnosis intervention:  Vc for \"move in the tube\" principles    Critical Behavior:  Neurologic State: Alert  Orientation Level: Oriented X4  Cognition: Appropriate decision making,Follows commands,Appropriate for age attention/concentration,Appropriate safety awareness     Functional Mobility Training:  Bed Mobility:                    Transfers:  Sit to Stand: Contact guard assistance (decrease \"move in the tube\" principle adherence )  Stand to Sit: Contact guard assistance                             Balance:  Sitting: Intact  Standing: Impaired  Standing - Static: Good  Standing - Dynamic : Fair  Ambulation/Gait Training:  Distance (ft): 160 Feet (ft)  Assistive Device: Gait belt  Ambulation - Level of Assistance: Contact guard assistance;Minimal assistance        Gait Abnormalities: Decreased step clearance; Path deviations        Base of Support: Center of gravity altered        Step Length: Left shortened;Right shortened                Stairs:              VAD education     Battery life? \"10hr\"  Low batteries alarm? Verbalized to change both batteries. Pt is aware that the batteries drain together  Emergency bag supply? Batteries, clips, . Educated on having LVAD emergency number-         Therapeutic Exercises:       Pain Rating:  No complaints     Activity Tolerance:   Fatigues with gait       After treatment patient left in no apparent distress:   Sitting in chair, Call bell within reach, and Bed / chair alarm activated    COMMUNICATION/COLLABORATION:   The patients plan of care was discussed with: Occupational therapist and Registered nurse.      Jerl Holstein, PTA   Time Calculation: 26 mins

## 2022-04-21 NOTE — PROGRESS NOTES
1920: TRANSFER - IN REPORT:    Verbal report received from KIRK Miller(name) on [facility]  being received from CVICU(unit) for routine progression of care      Report consisted of patients Situation, Background, Assessment and   Recommendations(SBAR). Information from the following report(s) SBAR, Kardex, Intake/Output, MAR, Recent Results and Cardiac Rhythm NSR was reviewed with the receiving nurse. Opportunity for questions and clarification was provided. Vitals stable, MAP 88    1930: Bedside and Verbal shift change report given to KIRK Fry (oncoming nurse) by Camille Hughes (offgoing nurse). Report included the following information SBAR, Kardex, Intake/Output, MAR, Recent Results and Cardiac Rhythm NSR.

## 2022-04-21 NOTE — PROGRESS NOTES
0800: Bedside and Verbal shift change report given to Swetha BRADLEY (oncoming nurse) by Isela Bobo (offgoing nurse). Report included the following information SBAR and Cardiac Rhythm NSR w/ freq PACs. 1500: Wife performing dressing change at bedside    1700: Bedside and Verbal shift change report given to [address] [place] [address] (oncoming nurse) by Patsy Medina (offgoing nurse). Report included the following information SBAR and Cardiac Rhythm NSR w/ PACs.

## 2022-04-21 NOTE — PROGRESS NOTES
Problem: Self Care Deficits Care Plan (Adult)  Goal: *Acute Goals and Plan of Care (Insert Text)  Description: FUNCTIONAL STATUS PRIOR TO ADMISSION: pt lives with wife, independent prior    HOME SUPPORT: The patient lived with wife but did not require assist.    Occupational Therapy Goals  Goals reviewed and remain appropriate 4/18/2022  Initiated 4/8/2022  1. Patient will perform ADLs standing 5 mins without fatigue or LOB with supervision/set-up within 7 day(s). 2.  Patient will perform lower body ADLs with minimal assistance/contact guard assist within 7 day(s). 3.  Patient will perform upper body ADLs with minimal assistance/contact guard assist within 7 day(s). 4.  Patient will perform toilet transfers with minimal assistance/contact guard assist within 7 day(s). 5.  Patient will perform all aspects of toileting with minimal assistance/contact guard assist within 7 day(s). 6.  Patient will participate in cardiac/sternal upper extremity therapeutic exercise/activities to increase independence with ADLs with supervision/set-up for 5 minutes within 7 day(s). 7.  Patient will perform VAD switchover routine as part of ADL routine (including batteries<>batteries, battery clip<>battery clip, mock SC<>SC) with minimal assistance/contact guard assist within 7 days. Outcome: Progressing Towards Goal   OCCUPATIONAL THERAPY TREATMENT  Patient: Kavitha Khoury (1 y.o. male)  Date: 4/21/2022  Diagnosis: HFrEF (heart failure with reduced ejection fraction) ([facility]) [I50.20] <principal problem not specified>  Procedure(s) (LRB):  REDO STERNOTOMY; RIGHT GROIN CUTDOWN FOR CANNULATION;  INSERTION OF LEFT VENTRICULAR ASSIST DEVICE (HMIII); AORTIC VALVE CLOSURE CHEL BY DR. Romana Mar. (N/A) 16 Days Post-Op  Precautions: Fall (mindful movement, LVAD)  Chart, occupational therapy assessment, plan of care, and goals were reviewed. ASSESSMENT  Patient continues with skilled OT services and is progressing towards goals. Patient received sitting in recliner and agreeable to working with therapy. Patient performed switchover to battery power, donned holster vest and switched from socks to cloth lace shoes. Patient able to ambulate to and from bathroom and perform toilet transfer, voided urine while sitting on commode and able to perform pericare. Patient then left ambulating with PT in hallway. Overall patient did well with session today with no LOB throughout and improving activity tolerance. Noted transfer out of CVICU prior to this session. Patient would benefit from skilled OT services during admission to improve independence with self care and functional mobility/transfers. Recommend discharge to home with HHOT at this time. Patient is verbalizing and is not demonstrating understanding of mindful-based movements (\"move in the tube\") principles of keeping UEs proximal to ribcage to prevent lateral pull on the sternum during load-bearing activities with visual and verbal cues required for compliance. Current Level of Function Impacting Discharge (ADLs): SBA to CGA for mobility/transfers, min A upper extremity activities of daily living, SBA for lower extremity activities of daily living     Other factors to consider for discharge: new LVAD, prior level of function, good family support         PLAN :  Patient continues to benefit from skilled intervention to address the above impairments. Continue treatment per established plan of care to address goals.     Recommend with staff: up to chair for meals and throughout day as tolerated, functional mobility to and from bathroom for toileting    Recommend next OT session: reaching high, cardiac exercises    Recommendation for discharge: (in order for the patient to meet his/her long term goals)  Occupational therapy at least 2 days/week in the home AND ensure assist and/or supervision for safety with LVAD maintenance    This discharge recommendation:  Has been made in collaboration with the attending provider and/or case management    IF patient discharges home will need the following DME: shower chair       SUBJECTIVE:   Patient stated it's nice to be out of the ICU.     OBJECTIVE DATA SUMMARY:   Cognitive/Behavioral Status:  Neurologic State: Alert  Orientation Level: Oriented X4  Cognition: Appropriate for age attention/concentration             Functional Mobility and Transfers for ADLs:  Bed Mobility:  Scooting: Stand-by assistance    Transfers:  Sit to Stand: Contact guard assistance (decrease \"move in the tube\" principle adherence )  Functional Transfers  Bathroom Mobility: Contact guard assistance  Toilet Transfer : Contact guard assistance       Balance:  Sitting: Intact  Standing: Impaired  Standing - Static: Good  Standing - Dynamic : Fair    ADL Intervention:                      Upper Body Dressing Assistance  Front Opened Shirt: Minimum assistance (holster vest)    Lower Body Dressing Assistance  Socks: Stand-by assistance (to doff)  Shoes with Cloth Laces: Stand-by assistance  Leg Crossed Method Used: Yes  Position Performed: Seated in chair  Cues: Verbal cues provided       Patient demonstrated switchover from power module to battery in prep for ADL routine with assist to gather supplies and don vest due to line management, otherwise independent for switchover.    Demonstrated the following tasks:    Independent Verbal cues Physical assistance Comments   Check PM & SC x      Ensure  clipped onto stabilization belt x      Remove front (green lighted) batteries from , place back batteries into front slots   x    Check batteries x      Position batteries into battery clips x      Don holster vest   x Due to [employer] power leads x      Insert power lead into battery clip x      [place] batteries in holster x      Secure batteries with Velcro and clips x             Patient instructed and educated on mindful movement principles based on Move in The Tube concept to include maintaining bilateral elbows close to rib cage when performing any load-bearing activity such as getting in/out of bed, pushing up from a chair, opening a door, or lifting a box. Patient was given a handout with diagrams of each correct/incorrect method of performing each of the above tasks. Patient instructed on the ability to utilize upper extremities outside the tube when doing any non-load bearing activity such as washing hair/body, brushing teeth, retrieving clothing items, or scratching your back. Patient encouraged to also perform upper extremity exercises \"outside of the tube\" to prevent scar tissue formation around sternal incision site. Patient instructed in detail about activities to heed with caution, allowing pain to be the guide. These activities include but are not limited to: mowing the lawn, riding a bike, walking a dog, lifting a child, workshop hobbies, golfing, sexual activity, vacuuming, fishing, scrubbing the floors, and moving furniture. Patient was given the [address] in the [place] handout to describe each of these activities in detail. Patient instructed no asymmetrical reaching over head to ensure B UEs when shoulders >90* i.e. reaching in cabinets and dressing. Instruction on upper body dressing techniques of over head, then arms through to decrease pain and unilateral shoulder flexion >90*. Instruction on the benefits of utilizing B UEs during functional tasks i.e. opening the fridge, stepping into the tub. Instruction if continued pain at home with shoulder IR for BM hygiene can use wet wipes and toilet tongs PRN. Avoid valsalva maneuvers. May have to adjust home setup to increase ease with items closer to waist height to prevent deep bending and the automatic  of asymmetrical UE WB/pushing for stabilization during bending.   Benefit to don clothing tailor sitting and don all clothing while sitting prior to standing. Patient demonstrated lower body dressing with Stand-by assistance. Instruction and indicated understanding on the benefits of loose clothing throughout to accommodate for post surgical swelling, decreased ROM and increased pain. Instruction and indicated understanding the technique of pull over shirt versus front open clothing. Increase activity tolerance for home, work, and sexual intercourse by pacing self with increasing the arm exercises, sitting duration, frequency OOB, walking, standing, and ADLs. Instructed and indicated understanding of s/s of too much activity, how to respond to s/s safely. Pain:  3.5/10 chest pain near chest tube sites    Activity Tolerance:   Fair, SpO2 stable on RA, and requires rest breaks    After treatment patient left in no apparent distress:   Ambulating with PT in hallway    COMMUNICATION/COLLABORATION:   The patients plan of care was discussed with: Physical therapy assistant and Registered nurse.      KENAN Fermin/L  Time Calculation: 23 mins

## 2022-04-21 NOTE — PROGRESS NOTES
1930: Bedside and Verbal shift change report given to Mark Lucas RN (oncoming nurse) by KIRK Chavez (offgoing nurse). Report included the following information SBAR, Kardex, Procedure Summary, Intake/Output, MAR, Recent Results, and Cardiac Rhythm a fib  .     2100: patient educated on the importance off calling out before getting out of the bed/chair and before ambulating. Educated on the importance of bed alarm and that it will be in place continuously patient agreeable. Problem: Falls - Risk of  Goal: *Absence of Falls  Description: Document Mary Willard Fall Risk and appropriate interventions in the flowsheet. Outcome: Progressing Towards Goal  Note: Fall Risk Interventions:  Mobility Interventions: Communicate number of staff needed for ambulation/transfer,Patient to call before getting OOB         Medication Interventions: Evaluate medications/consider consulting pharmacy,Patient to call before getting OOB,Teach patient to arise slowly    Elimination Interventions: Call light in reach,Patient to call for help with toileting needs              Problem: Patient Education: Go to Patient Education Activity  Goal: Patient/Family Education  Outcome: Progressing Towards Goal     Problem: Diabetes Self-Management  Goal: *Disease process and treatment process  Description: Define diabetes and identify own type of diabetes; list 3 options for treating diabetes. Outcome: Progressing Towards Goal  Goal: *Monitoring blood glucose, interpreting and using results  Description: Identify recommended blood glucose targets  and personal targets. Outcome: Progressing Towards Goal     Problem: Diabetes Self-Management  Goal: *Monitoring blood glucose, interpreting and using results  Description: Identify recommended blood glucose targets  and personal targets.   Outcome: Progressing Towards Goal     Problem: Patient Education: Go to Patient Education Activity  Goal: Patient/Family Education  Outcome: Progressing Towards Goal Problem: Pressure Injury - Risk of  Goal: *Prevention of pressure injury  Description: Document Jl Scale and appropriate interventions in the flowsheet.   Outcome: Progressing Towards Goal  Note: Pressure Injury Interventions:  Sensory Interventions: Assess changes in LOC,Check visual cues for pain    Moisture Interventions: Absorbent underpads,Minimize layers    Activity Interventions: Increase time out of bed    Mobility Interventions: HOB 30 degrees or less    Nutrition Interventions: Document food/fluid/supplement intake    Friction and Shear Interventions: Lift sheet,Minimize layers                Problem: Patient Education: Go to Patient Education Activity  Goal: Patient/Family Education  Outcome: Progressing Towards Goal     Problem: Heart Failure: Discharge Outcomes  Goal: *Demonstrates ability to perform prescribed activity without shortness of breath or discomfort  Outcome: Progressing Towards Goal  Goal: *Verbalizes understanding and describes prescribed diet  Outcome: Progressing Towards Goal

## 2022-04-21 NOTE — PROGRESS NOTES
[address] [facility] in [place] [address] Education:     Met with patient and family, Kathryn Hernadez, for LVAD education. Reviewed the following:     LVAD terms and functions- Patient named each component of the LVAD system. Mobile Power Unit (MPU) - Discussed changing AA batteries two times a year with time change. Discussed Advisory alarms on MPU. Discussed scenario of power loss on MPU. Patient able to answer that he will switch to batteries and has power from back up battery while switching to battery power. Discussed Equipment questions on patient workbook. Patient able to answer all 5 questions. Discuss how to check last 6  alarms on . Patient verbalized understanding. Emergency/travel bag - patient able to name all items belonging in his emergency/travel bag.      Hammad Horowitz RN  VAD Coordinator

## 2022-04-21 NOTE — PROGRESS NOTES
[address] [facility]. [place], [address]   Tel.  [phone]   Fax. [address] [facility]   [place], [address] [facility]   Tel.  [phone]   Fax. [phone] [address] [facility] [address]   Tel.  [phone]   Fax. [phone]         Subjective:     Radha Bartlett is a 69y.o. year old male seen in-patient for sleep evaluation in collaboration with the Heart Failure Nurse Navigator. He reports he uses CPAP nightly and is being followed by the [place]. Active Problems List    CHF with an EF of 15-20%   DM2    Assessment:     No flowsheet data found. Plan:      Patient encouraged to use his PAP device nightly and schedule follow-up with his sleep provider.  Corresponding risk factors for sleep apnea and the importance of proper treatment were reviewed.  Reviewed how treating sleep apnea can help improve heart function     The patient was counseled regarding proper sleep hygiene, with emphasis on ensuring sufficient total sleep time; safe driving and the benefits of exercise and weight loss.  All of his questions were addressed. CORBIN Lawler  Electronically signed.  04/21/22

## 2022-04-22 ENCOUNTER — APPOINTMENT (OUTPATIENT)
Dept: GENERAL RADIOLOGY | Age: 77
DRG: 001 | End: 2022-04-22
Attending: NURSE PRACTITIONER
Payer: MEDICARE

## 2022-04-22 ENCOUNTER — APPOINTMENT (OUTPATIENT)
Dept: NON INVASIVE DIAGNOSTICS | Age: 77
DRG: 001 | End: 2022-04-22
Attending: NURSE PRACTITIONER
Payer: MEDICARE

## 2022-04-22 LAB
ALBUMIN SERPL-MCNC: 2.8 G/DL (ref 3.5–5)
ALBUMIN/GLOB SERPL: 0.9 {RATIO} (ref 1.1–2.2)
ALP SERPL-CCNC: 136 U/L (ref 45–117)
ALT SERPL-CCNC: 26 U/L (ref 12–78)
ANION GAP SERPL CALC-SCNC: 4 MMOL/L (ref 5–15)
AST SERPL-CCNC: 69 U/L (ref 15–37)
BILIRUB SERPL-MCNC: 0.6 MG/DL (ref 0.2–1)
BNP SERPL-MCNC: 3206 PG/ML
BUN SERPL-MCNC: 24 MG/DL (ref 6–20)
BUN/CREAT SERPL: 27 (ref 12–20)
CALCIUM SERPL-MCNC: 8.4 MG/DL (ref 8.5–10.1)
CHLORIDE SERPL-SCNC: 101 MMOL/L (ref 97–108)
CO2 SERPL-SCNC: 27 MMOL/L (ref 21–32)
CREAT SERPL-MCNC: 0.9 MG/DL (ref 0.7–1.3)
ECHO EST RA PRESSURE: 15 MMHG
ECHO LV INTERNAL DIMENSION DIASTOLE INDEX: 2.61 CM/M2
ECHO LV INTERNAL DIMENSION DIASTOLIC: 5.5 CM (ref 4.2–5.9)
ECHO RIGHT VENTRICULAR SYSTOLIC PRESSURE (RVSP): 53 MMHG
ECHO TV REGURGITANT MAX VELOCITY: 3.1 M/S
ERYTHROCYTE [DISTWIDTH] IN BLOOD BY AUTOMATED COUNT: 21.2 % (ref 11.5–14.5)
GLOBULIN SER CALC-MCNC: 3 G/DL (ref 2–4)
GLUCOSE BLD STRIP.AUTO-MCNC: 119 MG/DL (ref 65–117)
GLUCOSE BLD STRIP.AUTO-MCNC: 150 MG/DL (ref 65–117)
GLUCOSE BLD STRIP.AUTO-MCNC: 200 MG/DL (ref 65–117)
GLUCOSE BLD STRIP.AUTO-MCNC: 203 MG/DL (ref 65–117)
GLUCOSE SERPL-MCNC: 114 MG/DL (ref 65–100)
HCT VFR BLD AUTO: 24.1 % (ref 36.6–50.3)
HGB BLD-MCNC: 7.5 G/DL (ref 12.1–17)
INR PPP: 3 (ref 0.9–1.1)
LDH SERPL L TO P-CCNC: 216 U/L (ref 85–241)
MAGNESIUM SERPL-MCNC: 1.9 MG/DL (ref 1.6–2.4)
MCH RBC QN AUTO: 26 PG (ref 26–34)
MCHC RBC AUTO-ENTMCNC: 31.1 G/DL (ref 30–36.5)
MCV RBC AUTO: 83.4 FL (ref 80–99)
NRBC # BLD: 0 K/UL (ref 0–0.01)
NRBC BLD-RTO: 0 PER 100 WBC
PLATELET # BLD AUTO: 274 K/UL (ref 150–400)
PMV BLD AUTO: 9.7 FL (ref 8.9–12.9)
POTASSIUM SERPL-SCNC: 4.2 MMOL/L (ref 3.5–5.1)
PROT SERPL-MCNC: 5.8 G/DL (ref 6.4–8.2)
PROTHROMBIN TIME: 29.4 SEC (ref 9–11.1)
RBC # BLD AUTO: 2.89 M/UL (ref 4.1–5.7)
SERVICE CMNT-IMP: ABNORMAL
SODIUM SERPL-SCNC: 132 MMOL/L (ref 136–145)
WBC # BLD AUTO: 6.9 K/UL (ref 4.1–11.1)

## 2022-04-22 PROCEDURE — 97530 THERAPEUTIC ACTIVITIES: CPT

## 2022-04-22 PROCEDURE — 71045 X-RAY EXAM CHEST 1 VIEW: CPT

## 2022-04-22 PROCEDURE — 93308 TTE F-UP OR LMTD: CPT

## 2022-04-22 PROCEDURE — 74011250637 HC RX REV CODE- 250/637: Performed by: INTERNAL MEDICINE

## 2022-04-22 PROCEDURE — 74011250637 HC RX REV CODE- 250/637: Performed by: NURSE PRACTITIONER

## 2022-04-22 PROCEDURE — 97535 SELF CARE MNGMENT TRAINING: CPT

## 2022-04-22 PROCEDURE — 80053 COMPREHEN METABOLIC PANEL: CPT

## 2022-04-22 PROCEDURE — 97116 GAIT TRAINING THERAPY: CPT

## 2022-04-22 PROCEDURE — 85610 PROTHROMBIN TIME: CPT

## 2022-04-22 PROCEDURE — 74011000250 HC RX REV CODE- 250: Performed by: THORACIC SURGERY (CARDIOTHORACIC VASCULAR SURGERY)

## 2022-04-22 PROCEDURE — 74011250637 HC RX REV CODE- 250/637: Performed by: STUDENT IN AN ORGANIZED HEALTH CARE EDUCATION/TRAINING PROGRAM

## 2022-04-22 PROCEDURE — 36415 COLL VENOUS BLD VENIPUNCTURE: CPT

## 2022-04-22 PROCEDURE — 74011250636 HC RX REV CODE- 250/636: Performed by: THORACIC SURGERY (CARDIOTHORACIC VASCULAR SURGERY)

## 2022-04-22 PROCEDURE — 74011636637 HC RX REV CODE- 636/637: Performed by: NURSE PRACTITIONER

## 2022-04-22 PROCEDURE — 83615 LACTATE (LD) (LDH) ENZYME: CPT

## 2022-04-22 PROCEDURE — 93750 INTERROGATION VAD IN PERSON: CPT | Performed by: NURSE PRACTITIONER

## 2022-04-22 PROCEDURE — 85027 COMPLETE CBC AUTOMATED: CPT

## 2022-04-22 PROCEDURE — 83880 ASSAY OF NATRIURETIC PEPTIDE: CPT

## 2022-04-22 PROCEDURE — 74011250636 HC RX REV CODE- 250/636: Performed by: NURSE PRACTITIONER

## 2022-04-22 PROCEDURE — APPSS60 APP SPLIT SHARED TIME 46-60 MINUTES: Performed by: NURSE PRACTITIONER

## 2022-04-22 PROCEDURE — 82962 GLUCOSE BLOOD TEST: CPT

## 2022-04-22 PROCEDURE — 74011000250 HC RX REV CODE- 250: Performed by: NURSE PRACTITIONER

## 2022-04-22 PROCEDURE — P9047 ALBUMIN (HUMAN), 25%, 50ML: HCPCS | Performed by: NURSE PRACTITIONER

## 2022-04-22 PROCEDURE — 83735 ASSAY OF MAGNESIUM: CPT

## 2022-04-22 PROCEDURE — 65660000001 HC RM ICU INTERMED STEPDOWN

## 2022-04-22 RX ORDER — WARFARIN 4 MG/1
4 TABLET ORAL ONCE
Status: COMPLETED | OUTPATIENT
Start: 2022-04-22 | End: 2022-04-22

## 2022-04-22 RX ORDER — EPLERENONE 25 MG/1
25 TABLET, FILM COATED ORAL DAILY
Status: DISCONTINUED | OUTPATIENT
Start: 2022-04-22 | End: 2022-05-06 | Stop reason: HOSPADM

## 2022-04-22 RX ORDER — DOBUTAMINE HYDROCHLORIDE 400 MG/100ML
6 INJECTION INTRAVENOUS CONTINUOUS
Status: DISCONTINUED | OUTPATIENT
Start: 2022-04-22 | End: 2022-04-25

## 2022-04-22 RX ADMIN — Medication 1 UNITS: at 22:18

## 2022-04-22 RX ADMIN — GUAIFENESIN 100 MG: 200 SOLUTION ORAL at 23:14

## 2022-04-22 RX ADMIN — HYDRALAZINE HYDROCHLORIDE 50 MG: 50 TABLET, FILM COATED ORAL at 04:43

## 2022-04-22 RX ADMIN — ASPIRIN 81 MG CHEWABLE TABLET 81 MG: 81 TABLET CHEWABLE at 09:38

## 2022-04-22 RX ADMIN — FENTANYL CITRATE 25 MCG: 0.05 INJECTION, SOLUTION INTRAMUSCULAR; INTRAVENOUS at 13:28

## 2022-04-22 RX ADMIN — ALBUMIN (HUMAN) 12.5 G: 0.25 INJECTION, SOLUTION INTRAVENOUS at 17:58

## 2022-04-22 RX ADMIN — SODIUM CHLORIDE, PRESERVATIVE FREE 10 ML: 5 INJECTION INTRAVENOUS at 07:15

## 2022-04-22 RX ADMIN — SUCRALFATE 1 G: 1 TABLET ORAL at 07:14

## 2022-04-22 RX ADMIN — SUCRALFATE 1 G: 1 TABLET ORAL at 22:19

## 2022-04-22 RX ADMIN — GABAPENTIN 200 MG: 100 CAPSULE ORAL at 17:54

## 2022-04-22 RX ADMIN — ALBUMIN (HUMAN) 12.5 G: 0.25 INJECTION, SOLUTION INTRAVENOUS at 10:39

## 2022-04-22 RX ADMIN — BUMETANIDE 2 MG: 0.25 INJECTION, SOLUTION INTRAMUSCULAR; INTRAVENOUS at 10:39

## 2022-04-22 RX ADMIN — HYDRALAZINE HYDROCHLORIDE 50 MG: 50 TABLET, FILM COATED ORAL at 12:00

## 2022-04-22 RX ADMIN — FAMOTIDINE 20 MG: 20 TABLET ORAL at 09:39

## 2022-04-22 RX ADMIN — AMIODARONE HYDROCHLORIDE 200 MG: 200 TABLET ORAL at 09:39

## 2022-04-22 RX ADMIN — POTASSIUM CHLORIDE 20 MEQ: 750 TABLET, EXTENDED RELEASE ORAL at 09:46

## 2022-04-22 RX ADMIN — POLYETHYLENE GLYCOL 3350 17 G: 17 POWDER, FOR SOLUTION ORAL at 09:38

## 2022-04-22 RX ADMIN — EPLERENONE 25 MG: 25 TABLET, FILM COATED ORAL at 09:39

## 2022-04-22 RX ADMIN — AMIODARONE HYDROCHLORIDE 200 MG: 200 TABLET ORAL at 17:54

## 2022-04-22 RX ADMIN — FENTANYL CITRATE 25 MCG: 0.05 INJECTION, SOLUTION INTRAMUSCULAR; INTRAVENOUS at 15:51

## 2022-04-22 RX ADMIN — FAMOTIDINE 20 MG: 20 TABLET ORAL at 22:19

## 2022-04-22 RX ADMIN — SODIUM CHLORIDE, PRESERVATIVE FREE 10 ML: 5 INJECTION INTRAVENOUS at 22:00

## 2022-04-22 RX ADMIN — HYDRALAZINE HYDROCHLORIDE 50 MG: 50 TABLET, FILM COATED ORAL at 22:19

## 2022-04-22 RX ADMIN — SUCRALFATE 1 G: 1 TABLET ORAL at 11:30

## 2022-04-22 RX ADMIN — GABAPENTIN 200 MG: 100 CAPSULE ORAL at 09:39

## 2022-04-22 RX ADMIN — WATER 1 MG: 1 INJECTION INTRAMUSCULAR; INTRAVENOUS; SUBCUTANEOUS at 23:10

## 2022-04-22 RX ADMIN — COLCHICINE 0.6 MG: 0.6 TABLET, FILM COATED ORAL at 09:40

## 2022-04-22 RX ADMIN — LOSARTAN POTASSIUM 25 MG: 25 TABLET, FILM COATED ORAL at 09:38

## 2022-04-22 RX ADMIN — GABAPENTIN 200 MG: 100 CAPSULE ORAL at 22:19

## 2022-04-22 RX ADMIN — BUMETANIDE 2 MG: 0.25 INJECTION, SOLUTION INTRAMUSCULAR; INTRAVENOUS at 17:54

## 2022-04-22 RX ADMIN — WARFARIN SODIUM 4 MG: 4 TABLET ORAL at 17:54

## 2022-04-22 RX ADMIN — SUCRALFATE 1 G: 1 TABLET ORAL at 17:54

## 2022-04-22 RX ADMIN — MIRTAZAPINE 7.5 MG: 15 TABLET, FILM COATED ORAL at 22:19

## 2022-04-22 NOTE — PROGRESS NOTES
[facility] in [place], [place]  Inpatient Progress Note      Patient name: Ross Day  Patient : 1945  Patient MRN: [id number]  Consulting MD: David Mcdonough MD  Date of service: 22    REASON FOR REFERRAL:  Management of LVAD    PLAN OF CARE:  · 69 y.o. male with severe ischemic cardiomyopathy, LVEF 15-20% s/p HM3 LVAD implant as DT on 22 by Dr. Stacy Martinez c/b intraoperative bleeding requiring multiple blood products and subsequent RV failure with severe TR on high dose dobutamine IV and sternal wound infection on antibiotics  · Plan for remainder of hospitalization includes:  · Surveillance weekly echos to determine if RV function improves and TV coapts  · Diuresis and removal of chest tubes when ready  · PT/OT/Nutrition    PLAN:  POD 16  TTE shows severe TR but improved RV function; continue to optimize medically   Repeat TTE on Monday   Continue speed 4800rpms, low speed 4400  Continue dobutamine 6 mcg/kg/min-adjust to current weight- no weaning at this time due to severe TR  Continue hydralazine 50 mg PO q8h   Continue  amiodarone 200 mg PO twice daily  Continue losartan 25 mg PO daily   Start Eplerenone 25mg daily  Continue Bumex 2mg IV BID  S/p Venofer 200mg x 2 doses   Completed course of cefepime for sternal incision   Monitor MAP via doppler  Driveline dressing changes daily for now until 5/3/22  Wound culture negative   NANDINI hose    Needs EKG weekly on   Continue warfarin, goal INR 2-3; 4mg tonight   ASA 81mg daily  Continue colchicine for pericarditis   Continue gabapentin 200 mg PO TID   IV fentanyl prn (pt with hx anaphylaxis to oxycodone)  Continue bowel regimen as needed   Check Orthostatics today   Pulmonary toilet  Monitor CT on water seal   CPAP QHS  Advanced care plan forms on file   Strict I/O, daily weights, Na+ restricted diet   Continue VAD education   Equipment ordered, will set goal for home next week     LAST 24 HOURS:  No acute events overnight  Net neg -1.5L  VSS and LVAD flows stable- no acute alarms on VAD  Creatinine stable 0.9  T Bili 0.6   PBNP down to 3200  Improved CT outpt but remains elevated       REVIEW OF SYSTEMS:  Review of Systems   Constitutional: Negative for chills, fever, malaise/fatigue and weight loss. Respiratory: Negative for cough and shortness of breath. Cardiovascular: Negative for chest pain, palpitations, orthopnea and leg swelling. Gastrointestinal: Negative for abdominal pain, nausea and vomiting. Neurological: Negative for dizziness and weakness. Psychiatric/Behavioral: Negative. LIFE GOALS:  Patient's personal goals include: getting stronger and going home soon  Important upcoming milestones or family events: The patient identifies the following as important for living well: being independent, being at home, enjoying family time        OBJECTIVE:  PHYSICAL EXAM:  Visit Vitals  /88 (BP 1 Location: Left arm)   Pulse 79   Temp 98.5 °F (36.9 °C)   Resp 18   Ht 6' 1\" (1.854 m)   Wt 190 lb 11.2 oz (86.5 kg)   SpO2 99%   BMI 25.16 kg/m²       LVAD INTERROGATION:  Device interrogated in person  Device function normal, normal flow, no events  LVAD   Pump Speed (RPM): 4800  Pump Flow (LPM): 3.2  MAP: 78 (L radial doppler)  PI (Pulsitility Index): 6.3  Power: 3.1   Test: No  Back Up  at Bedside & Labeled: Yes  Power Module Test: No  Driveline Site Care: No  Driveline Dressing: Clean, Dry, and Intact  MAP in Therapeutic Range (Outpatient): Yes  Testing  Alarms Reviewed: Yes  Back up SC speed: 4800  Back up Low Speed Limit: 4400  Emergency Equipment with Patient?: Yes  Emergency procedures reviewed?: Yes  Drive line site inspected?: Yes  Drive line intergrity inspected?: Yes  Drive line dressing changed?: No      Physical Exam  Vitals reviewed. Constitutional:       General: He is not in acute distress. Appearance: Normal appearance. Cardiovascular:      Rate and Rhythm: Normal rate and regular rhythm. Comments: LVAD Hum noted on auscultation  Pulmonary:      Effort: Pulmonary effort is normal. No respiratory distress. Abdominal:      General: Bowel sounds are normal. There is no distension. Palpations: Abdomen is soft. Tenderness: There is no abdominal tenderness. Musculoskeletal:      Right lower le+ Pitting Edema present. Left lower le+ Pitting Edema present. Comments: Improved BLE edema    Skin:     General: Skin is warm and dry. Capillary Refill: Capillary refill takes less than 2 seconds. Neurological:      General: No focal deficit present. Mental Status: He is alert and oriented to person, place, and time. Psychiatric:         Mood and Affect: Mood normal.         Behavior: Behavior normal.         Thought Content: Thought content normal.         Judgment: Judgment normal.            CARDIAC IMAGING AND DIAGNOSTICS REVIEWED:  Echo 22- LVEF 15-20%, severe TR, TAPSE 0.5cm, RVIDd 6cm   Echo (22)    Left Ventricle: Left ventricle size is normal. Moderately increased wall thickness. Findings consistent with concentric remodeling. Severe global hypokinesis present. Severely reduced left ventricular systolic function with a visually estimated EF of 10 -15%. 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   Aortic Valve: Moderate sclerosis of the aortic valve cusp.   Mitral Valve: Moderately thickened leaflet. Mild transvalvular regurgitation.   Tricuspid Valve: Severe transvalvular regurgitation.   Right Atrium: Right atrium is severely dilated.     Echo (3/2/22)   · LV 10-15%  · Mod-severe MR      Echo (21):  · LV 15-20%.    · Mod-severe, MR, mod-TR     Echo (21)  · LV: Estimated LVEF is 20 - 25%. Normal wall thickness. Mildly dilated left ventricle. Severely and globally reduced systolic function.  Severe (grade 4) left ventricular diastolic dysfunction. · LA: Severely dilated left atrium. · RA: Severely dilated right atrium. · MV: Moderate mitral valve regurgitation is present. · TV: Moderate tricuspid valve regurgitation is present. · AO: Mild aortic root dilatation. · RV: Pacer/ICD present. · LVED 6.2cm     EKG (5/20/21) SB with 1degree AV block, QRS 116ms     LHC (5/24/21) Native Coronaries: LM - moderate to severe distal disease 50%. % prox occluded (), heavily calcified, LCx: 80% proximal stenosis. OM1 is  (seen filling faintly via collaterals). OM2 99% stenosis. RCA: 100% proximal occluded.  LIMA to LAD: patent, supplies only a diagonal branch (probably D2). The LAD distal to the bifurcation is 100% occluded () and fills via right to left collaterals off the SVG to RCA. SVG to R-PDA: patent with moderate diffuse irregularities but no obstructive disease in the graft.    No appealing interventional targets.      NST as above     ICD Interrogation (11/12/2021) [employer] VVI, thoracic impedence trending up, no events, normal device function and good battery  ICD interrogation (5/12/21) [employer] single lead, no events, normal device function and good battery     HEMODYNAMICS:  RHC 5/24/21 CI 1.97, PA 33/9/17, RA 1, PCWP 6- off milrinone   CPEST not done     Patient underwent a 6 minute walk test 11/12/2021     6 Min Walk Report 11/12/2021 7/6/2021 5/20/2021   (PRE) HR 88 98 74   (PRE) O2 Sat 100 - 99   (POST)  - 81   (POST) O2 Sat 99 98% on Ra 99   Distance in Meters 386.58 - 1158. 24       OTHER IMAGING:  CXR (6/17/21) clear  CT 5/21/21 1. Irregular pulmonary nodule in the left upper lobe measuring 2 cm, suspicious for primary pulmonary malignancy. 2. Additional 6 mm left upper lobe pulmonary nodule. 3. Severe calcific atherosclerosis of the coronary arteries and abdominal aorta. No aneurysm. 4. Cardiomegaly.  5. No acute abnormality within the chest, abdomen, or pelvis. LABORATORY RESULTS:     Labs Latest Ref Rng & Units 4/22/2022 4/21/2022 4/20/2022 4/19/2022 4/18/2022 4/17/2022 4/16/2022   WBC 4.1 - 11.1 K/uL 6.9 7.0 8.2 9.0 9.6 9.3 8.8   RBC 4.10 - 5.70 M/uL 2.89(L) 2.90(L) 3.02(L) 3.09(L) 3.10(L) 2.97(L) 2.94(L)   Hemoglobin 12.1 - 17.0 g/dL 7. 5(L) 7. 5(L) 7. 9(L) 8.0(L) 8. 1(L) 7. 7(L) 7. 7(L)   Hematocrit 36.6 - 50.3 % 24. 1(L) 23. 5(L) 24. 5(L) 25. 3(L) 25. 1(L) 23. 9(L) 23. 6(L)   MCV 80.0 - 99.0 FL 83.4 81.0 81.1 81.9 81.0 80.5 80.3   Platelets 771 - 641 K/uL 274 268 256 240 243 205 203   Lymphocytes 12 - 49 % - - - - - - -   Monocytes 5 - 13 % - - - - - - -   Eosinophils 0 - 7 % - - - - - - -   Basophils 0 - 1 % - - - - - - -   Bands 0 - 6 % - - - - - - -   Albumin 3.5 - 5.0 g/dL 2. 8(L) 2. 5(L) 2. 6(L) 2. 6(L) 2. 6(L) 2. 6(L) 2. 7(L)   Calcium 8.5 - 10.1 MG/DL 8.4(L) 7. 9(L) 7. 8(L) 8.0(L) 8. 3(L) 8.4(L) 8.4(L)   Glucose 65 - 100 mg/dL 114(H) 131(H) 123(H) 191(H) 153(H) 174(H) 118(H)   BUN 6 - 20 MG/DL 24(H) 18 16 18 26(H) 23(H) 28(H)   Creatinine 0.70 - 1.30 MG/DL 0.90 0.83 0.76 0.80 0.89 0.84 0.85   Sodium 136 - 145 mmol/L 132(L) 135(L) 133(L) 131(L) 127(L) 128(L) 128(L)   Potassium 3.5 - 5.1 mmol/L 4.2 3.6 3.6 4.4 5.0 4.8 4.8   TSH 0.36 - 3.74 uIU/mL - - - - - - -   LDH 85 - 241 U/L 216 168 196 206 192 196 201   Some recent data might be hidden     Lab Results   Component Value Date/Time    TSH 2.26 04/10/2022 03:00 AM    TSH 1.68 03/01/2022 11:50 AM    TSH 1.47 05/26/2021 10:44 PM    TSH 1.97 05/20/2021 04:28 PM    TSH 1.41 02/09/2021 03:05 PM    TSH 1.740 08/14/2018 09:58 AM    TSH 1.710 10/18/2017 12:00 AM         HISTORY:  PAST MEDICAL HISTORY:  Past Medical History:   Diagnosis Date    CAD (coronary artery disease) '90 '97, '13 x 2    MI, code ice in 2013 at [facility]    Calculus of kidney     Colonic polyps     Congestive heart failure, unspecified     Diabetes ([facility] [address]     Gastritis     Hypercholesterolemia     Sleep apnea        PAST SURGICAL HISTORY:  Past Surgical History:   Procedure Laterality Date    COLONOSCOPY  04/06/2011    16, due 21    COLONOSCOPY N/A 3/2/2022    COLONOSCOPY    :- performed by Taryn Cabrera MD at [facility] ENDOSCOPY    ENDOSCOPY, COLON, DIAGNOSTIC      11, due 16    HX CORONARY ARTERY BYPASS GRAFT  8/22/12    x 4 vessel by MISSY Ramirez    HX HEENT      LASIK    HX PACEMAKER PLACEMENT  1/30/13    [place] CARDIAC SURG PROCEDURE UNLIST  2012    x 4 vessels       FAMILY HISTORY:  Family History   Problem Relation Age of Onset    Heart Disease Mother         MI    Heart Disease Father         CAD & PVD    Lung Disease Father     Cancer Father         lung    Diabetes Maternal Grandmother     Heart Disease Other         CAD    Stroke Sister        SOCIAL HISTORY:  Social History     Socioeconomic History    Marital status:    Tobacco Use    Smoking status: Never Smoker    Smokeless tobacco: Never Used   Vaping Use    Vaping Use: Never used   Substance and Sexual Activity    Alcohol use:  Yes     Alcohol/week: 0.0 standard drinks     Comment:  VERY RARE    Drug use: No       ALLERGY:  Allergies   Allergen Reactions    Oxycodone Anaphylaxis    Pcn [Penicillins] Hives        CURRENT MEDICATIONS:    Current Facility-Administered Medications:     warfarin (COUMADIN) tablet 4 mg, 4 mg, Oral, ONCE, Terry, Danica B, NP    eplerenone (INSPRA) tablet 25 mg, 25 mg, Oral, DAILY, Terry, Danica B, NP, 25 mg at 04/22/22 0939    DOBUTamine (DOBUTREX) 1,000 mg/250 mL (4,000 mcg/mL) infusion, 6 mcg/kg/min, IntraVENous, CONTINUOUS, Terry, Danica B, NP, Last Rate: 7.8 mL/hr at 04/22/22 1000, 6 mcg/kg/min at 04/22/22 1000    potassium chloride SR (KLOR-CON 10) tablet 20 mEq, 20 mEq, Oral, DAILY, Terry, Danica B, NP, 20 mEq at 04/22/22 0946    guaiFENesin (ROBITUSSIN) 100 mg/5 mL oral liquid 100 mg, 100 mg, Oral, Q4H PRN, Kalina MONTERO MD, 100 mg at 04/19/22 1248    losartan (COZAAR) tablet 25 mg, 25 mg, Oral, DAILY, Terry, Danica B, NP, 25 mg at 04/22/22 0938    bumetanide (BUMEX) injection 2 mg, 2 mg, IntraVENous, BID, Terry, Danica B, NP, 2 mg at 04/22/22 1039    polyethylene glycol (MIRALAX) packet 17 g, 17 g, Oral, DAILY, Rubye Si B, NP, 17 g at 04/22/22 8724    senna-docusate (PERICOLACE) 8.6-50 mg per tablet 1 Tablet, 1 Tablet, Oral, DAILY PRN, Satira Ace, NP    oxymetazoline (AFRIN) 0.05 % nasal spray 2 Spray, 2 Spray, Both Nostrils, BID PRN, Rubye Si B, NP    hydrALAZINE (APRESOLINE) tablet 50 mg, 50 mg, Oral, Q8H, [place], Gorge Joycey T, NP, 50 mg at 04/22/22 0443    amiodarone (CORDARONE) tablet 200 mg, 200 mg, Oral, BID, Polliard, Gorge Joycey T, NP, 200 mg at 04/22/22 2375    colchicine tablet 0.6 mg, 0.6 mg, Oral, DAILY, Polliard, Gorge Joycey T, NP, 0.6 mg at 04/22/22 0940    alteplase (CATHFLO) 1 mg in sterile water (preservative free) 1 mL injection, 1 mg, InterCATHeter, PRN, Andrea Lyons MD, 1 mg at 04/16/22 1149    bisacodyL (DULCOLAX) suppository 10 mg, 10 mg, Rectal, DAILY PRN, Caleb Pena MD, 10 mg at 04/12/22 1533    gabapentin (NEURONTIN) capsule 200 mg, 200 mg, Oral, TID, Polliard, Gorge Joycey T, NP, 200 mg at 04/22/22 0939    albumin human 25% (BUMINATE) solution 12.5 g, 12.5 g, IntraVENous, BID, Polliard, Christianm Maria Del Carmeny T, NP, 12.5 g at 04/22/22 1039    famotidine (PEPCID) tablet 20 mg, 20 mg, Oral, Q12H, Caleb Pena MD, 20 mg at 04/22/22 0939    mirtazapine (REMERON) tablet 7.5 mg, 7.5 mg, Oral, QHS, Danica Stewart, NP, 7.5 mg at 04/21/22 2306    lidocaine 4 % patch 1 Patch, 1 Patch, TransDERmal, Q24H, Danica Stewart, NP, 1 Patch at 04/21/22 1242    Warfarin NP/MD dosing, , Other, PRN, Danica Stewart, NP    melatonin tablet 3 mg, 3 mg, Oral, QHS PRN, Neeta Leone MD, 3 mg at 04/20/22 2127    hydrALAZINE (APRESOLINE) 20 mg/mL injection 10 mg, 10 mg, IntraVENous, Q6H PRN, Jenny JACOBSON NP, 10 mg at 04/18/22 1809    aspirin chewable tablet 81 mg, 81 mg, Oral, DAILY, Kristopher Mitchell, NP, 81 mg at 04/22/22 1516    hydrALAZINE (APRESOLINE) 20 mg/mL injection 5 mg, 5 mg, IntraVENous, Q6H PRN, Gabriella JACOBSON NP, 5 mg at 04/13/22 8322    sodium chloride (NS) flush 5-40 mL, 5-40 mL, IntraVENous, Q8H, Lita Wong NP, 10 mL at 04/22/22 0715    sodium chloride (NS) flush 5-40 mL, 5-40 mL, IntraVENous, PRN, Ajit Wong, NP    0.9% sodium chloride infusion, 9 mL/hr, IntraVENous, CONTINUOUS, Lita Wong NP, Last Rate: 3 mL/hr at 04/20/22 0513, 3 mL/hr at 04/20/22 0513    acetaminophen (TYLENOL) tablet 650 mg, 650 mg, Oral, Q6H PRN, Ajit Wong NP, 650 mg at 04/21/22 1306    naloxone (NARCAN) injection 0.4 mg, 0.4 mg, IntraVENous, PRN, Ajit Wong NP    ondansetron (ZOFRAN) injection 4 mg, 4 mg, IntraVENous, Q4H PRN, Lita Wong NP, 4 mg at 04/11/22 2139    albuterol-ipratropium (DUO-NEB) 2.5 MG-0.5 MG/3 ML, 3 mL, Nebulization, Q6H PRN, Ajit Wong NP    glucose chewable tablet 16 g, 4 Tablet, Oral, PRN, Ajit Wong, NP    glucagon (GLUCAGEN) injection 1 mg, 1 mg, IntraMUSCular, PRN, Ajit Wong NP    insulin lispro (HUMALOG) injection, , SubCUTAneous, AC&HS, Ajit Wong NP, 2 Units at 04/21/22 2307    sucralfate (CARAFATE) tablet 1 g, 1 g, Oral, AC&HS, Ajit Wong, NP, 1 g at 04/22/22 9554    0.45% sodium chloride infusion, 10 mL/hr, IntraVENous, CONTINUOUS, Johnathon Najera MD, Stopped at 04/10/22 0730    fentaNYL citrate (PF) injection 25 mcg, 25 mcg, IntraVENous, Q2H PRN, Ajit Wong NP, 25 mcg at 04/21/22 1412    PATIENT CARE TEAM:  Patient Care Team:  Birdie Shaikh MD as PCP - General (Internal Medicine)  Birdie Shaikh MD as PCP - REHABILITATION [facility]  Edwin Rees MD as Physician (Cardiology)  Sanjuana Mitchell MD as Physician (Gastroenterology)  Melia Rodriguez MD as Physician (Orthopedic Surgery)  Baltazar Victor MD as Physician (Ophthalmology)  Hyacinth Patino MD (Cardiology)  Carley Gomez MD (Cardiology)       Thank you for allowing us to participate in this patient's care.       Lee Garrett NP   [address] [place] [address], [address]  Phone: [phone]

## 2022-04-22 NOTE — PROGRESS NOTES
Critical Care Note      Cardiac surgery was called for the evaluation and management of: NYHA class IV A/C systolic heart failure, inotrope dependence, right ventricular dysfunction      The critical nature of the patient's condition includes the following: The patient is at imminent risk of death from NYHA class IV A/C systolic heart failure, inotrope dependence, and right ventricular dysfunction. [facility] is at imminent risk of needing escalation of MCS and right sided MCS     Critical care diagnoses that are being treated:  NYHA class IV A/C systolic heart failure / inotrope dependence  Right ventricular dysfunction      HPI: Patient is a 70 yo with severe NYHA class IV A/C systolic heart failure, inotrope dependence, and right ventricular dysfunction.  The patient is at imminent risk of death from NYHA class IV A/C systolic heart failure, inotrope dependence, and right ventricular dysfunction. [facility] is at imminent risk of needing escalation of MCS and right sided MCS.  Asked to evaluate for permanent LVAD     NYHA class IV A/C systolic heart failure / Cardiogenic shock   Continues on Dobutamine  NT pro-BNP 5K  Pulmonary edema / pleural effusion on CXR  MAPs 70-80s  Discussed with HF team   Diuretics      Right ventricular dysfunction   Continues on Dobutamine   LFTs 50's  Hepatic congestion               Intake/Output Summary (Last 24 hours) at 4/22/2022 1114  Last data filed at 4/22/2022 0430  Gross per 24 hour   Intake 1069.4 ml   Output 2415 ml   Net -1345.6 ml      Visit Vitals  /88 (BP 1 Location: Left arm)   Pulse 79   Temp 98.5 °F (36.9 °C)   Resp 18   Ht 6' 1\" (1.854 m)   Wt 190 lb 11.2 oz (86.5 kg)   SpO2 98%   BMI 25.16 kg/m²      CXR Results  (Last 48 hours)               04/22/22 0507  XR CHEST PORT Final result    Impression:  Stable appearance of the chest.           Narrative:  INDICATION: Heart failure       COMPARISON: 4/21/2022       FINDINGS: Single AP portable view of the chest obtained at 4:21 AM demonstrates   no change in position of the AICD or left ventricular assist device. Mediastinal   drain and bilateral chest tubes are stable. Right arm PICC line is stable. There   is unchanged cardiomegaly. The patient is status post median sternotomy. There   is persistent mild pulmonary edema with bibasilar atelectasis. No pneumothorax   is seen. There is no evidence of pleural effusion. 04/21/22 0504  XR CHEST PORT Final result    Impression:  No significant change. Narrative:  INDICATION:  Heart failure. EXAM: CXR Portable. FINDINGS: Portable chest shows satisfactory support lines/devices without   significant change since yesterday. There is no apparent pneumothorax. Lungs   show stable bibasilar haziness. Heart size is stable. There is no overt   pulmonary edema. LVAD   Pump Speed (RPM): 4800  Pump Flow (LPM): 3.2  MAP: 78 (L radial doppler)  PI (Pulsitility Index): 4.1  Power: 3.1   Test: No  Back Up  at Bedside & Labeled: Yes  Power Module Test: No  Driveline Site Care: Yes  Driveline Dressing: Clean, Dry, and Intact  MAP in Therapeutic Range (Outpatient): Yes  Testing  Alarms Reviewed: Yes  Back up SC speed: 4800  Back up Low Speed Limit: 4400  Emergency Equipment with Patient?: Yes  Emergency procedures reviewed?: Yes  Drive line site inspected?: Yes  Drive line intergrity inspected?: Yes  Drive line dressing changed?: No      Total critical care time - 30 minutes (CPT 28860)      I personally spent the above critical care time. This is time spent at this critically ill patient's bedside / unit / floor actively involved in patient care as well as the coordination of care. This does not include any procedural time which has been billed separately. This does not include any NP/PA patient care time.       I had a face to face encounter with the patient, reviewed and interpreted patient data including clinical events, labs, images, vital signs, I/O's, and examined patient. I have discussed the case and the plan and management of the patient's care with the consulting services and bedside nurses. This patient has a high probability of imminent, clinically significant deterioration, which requires the highest level of preparedness to intervene urgently. I participated in the decision-making and personally managed or directed the management of life and organ supporting interventions to treat or prevent imminent deterioration. This patient has a critical illness or injury that is acutely impairing one or more vital organ systems such that there is a high probability of imminent or life threatening deterioration in the patient's condition. This patient requires high complexity medical decision making to assess, manipulate, and support vital organ system failure. After stabilization, this patient still requires management to prevent further life / organ threatening deterioration.

## 2022-04-22 NOTE — PROGRESS NOTES
1930: Bedside and Verbal shift change report given to KIRK Sanchez (oncoming nurse) by Al Christie RN (offgoing nurse). Report included the following information SBAR, Kardex and Procedure Summary.

## 2022-04-22 NOTE — PROGRESS NOTES
Problem: Mobility Impaired (Adult and Pediatric)  Goal: *Acute Goals and Plan of Care (Insert Text)  Description: FUNCTIONAL STATUS PRIOR TO ADMISSION: Patient was independent and active without use of DME.    HOME SUPPORT PRIOR TO ADMISSION: The patient lived with his wife but did not require assist.    Physical Therapy Goals- Continue goals with exception of #7 for next 7 days 4/22/2022  Weekly Reassessment 4/15/2022 (goals progressed)  1. Patient will move from supine to sit and sit to supine , scoot up and down, and roll side to side in bed with head of bed flat with supervision/set-up within 7 days. 2.  Patient will perform sit to/from stand from chair height with supervision/set-up within 7 days. 3.  Patient will ambulate 300 feet stand by assist within 7 days. 4.  Patient will ascend/descend 6 stairs with handrail(s) with minimal assistance/contact guard assist within 14 days. 5.  Patient will perform cardiac exercises per protocol with supervision/set-up within 7 days. 6.  Patient will verbally and functionally recall mindful movement precautions within 7 days. 7.  Patient will perform power exchange for power module to/from battery with stand by assist within 7 days. -MET currently independent 4/22/2022    Initiated 4/8/2022  1. Patient will move from supine to sit and sit to supine , scoot up and down, and roll side to side in bed with supervision/set-up within 7 days. 2.  Patient will perform sit to/from stand with supervision/set-up within 7 days. 3.  Patient will ambulate 50 feet with least restrictive assistive device and minimal assistance/contact guard assist within 7 days. 4.  Patient will ascend/descend 6 stairs with handrail(s) with minimal assistance/contact guard assist within 14 days. 5.  Patient will perform cardiac exercises per protocol with supervision/set-up within 7 days. 6.  Patient will verbally and functionally recall 3/3 sternal precautions within 7 days.   7. Patient will perform power exchange for power module to/from battery with minimal assistance within 7 days. Outcome: Progressing Towards Goal  PHYSICAL THERAPY TREATMENT  Patient: Radha Bartlett (24 y.o. male)  Date: 4/22/2022  Diagnosis: HFrEF (heart failure with reduced ejection fraction) ([facility]) [I50.20] <principal problem not specified>  Procedure(s) (LRB):  REDO STERNOTOMY; RIGHT GROIN CUTDOWN FOR CANNULATION;  INSERTION OF LEFT VENTRICULAR ASSIST DEVICE (HMIII); AORTIC VALVE CLOSURE CHEL BY DR. Gordan Snellen. (N/A) 17 Days Post-Op  Precautions: Fall (mindful movement, LVAD)  Chart, physical therapy assessment, plan of care and goals were reviewed. ASSESSMENT  Patient continues with skilled PT services and is progressing towards goals. Patient received supine in bed agreeable to therapy. Independent with power module to battery switch overs and in self test. Ambulation distance and tolerance improved this session with only one LOB/path deviations which he was able to self correct. Still with SOB but less so than previous sessions this week. Introduced clip malfunction scenario which patient was able to state that the illuminated dash maia would indicate the corresponding power lead and an issue with the battery and if not the battery then the clip. Reviewed stepwise problem solving (battery replacement, undo and redo connection from power lead to clip, and changing out clip). Will revisit next session to determine learning of stepwise problem solving. Recommend HHPT. Current Level of Function Impacting Discharge (mobility/balance): supervision-min A    Other factors to consider for discharge: new LVAD, new sternotomy         PLAN :  Patient continues to benefit from skilled intervention to address the above impairments. Continue treatment per established plan of care. to address goals.     Recommendation for discharge: (in order for the patient to meet his/her long term goals)  Physical therapy at least 2 days/week in the home     This discharge recommendation:  Has been made in collaboration with the attending provider and/or case management    IF patient discharges home will need the following DME: to be determined (TBD)       SUBJECTIVE:   Patient stated I feel like I'm ready to run!     OBJECTIVE DATA SUMMARY:   Cardiac diagnosis intervention:  Great compliance with move in the tube without verbal or tactile cues    Critical Behavior:  Neurologic State: Alert,Eyes open spontaneously  Orientation Level: Oriented X4  Cognition: Appropriate decision making,Appropriate for age attention/concentration,Appropriate safety awareness  Functional Mobility Training:  Bed Mobility:  Supine to Sit: Supervision  Scooting: Supervision  Transfers:  Sit to Stand: Contact guard assistance  Stand to Sit: Contact guard assistance  Balance:  Sitting: Intact  Standing: Impaired; Without support  Standing - Static: Good  Standing - Dynamic : Good;Fair  Ambulation/Gait Training:  Distance (ft): 200 Feet (ft)  Ambulation - Level of Assistance: Stand-by assistance  Gait Abnormalities: Decreased step clearance; Path deviations (path deviation x1)  Base of Support: Widened  Speed/Charlene: Pace decreased (<100 feet/min)  Step Length: Right shortened;Left shortened      VAD power transition  Patient performed switchover from power module to battery.   Completed the following tasks:   Independent Verbal cues Physical assist Comments   Don holster vest x      Check batteries x      Check  x      Ensure  clipped onto stabilization belt x      Position batteries in clips x      Disconnect power leads x      Insert power lead into battery x      [place] batteries in holster  x      Secure batteries with velcro and clips x        Pain Rating:  Denied pain    Activity Tolerance:   Good, SpO2 stable on RA, and observed SOB with activity      After treatment patient left in no apparent distress:   Sitting in chair, Call bell within reach, and Bed / chair alarm activated    COMMUNICATION/COLLABORATION:   The patients plan of care was discussed with: Occupational therapist and Registered nurse.      Gabriel Fierro, PT, DPT   Time Calculation: 35 mins

## 2022-04-22 NOTE — PROGRESS NOTES
Problem: Self Care Deficits Care Plan (Adult)  Goal: *Acute Goals and Plan of Care (Insert Text)  Description: FUNCTIONAL STATUS PRIOR TO ADMISSION: pt lives with wife, independent prior    HOME SUPPORT: The patient lived with wife but did not require assist.    Occupational Therapy Goals  Goals reviewed and remain appropriate 4/18/2022  Initiated 4/8/2022  1. Patient will perform ADLs standing 5 mins without fatigue or LOB with supervision/set-up within 7 day(s). 2.  Patient will perform lower body ADLs with minimal assistance/contact guard assist within 7 day(s). 3.  Patient will perform upper body ADLs with minimal assistance/contact guard assist within 7 day(s). 4.  Patient will perform toilet transfers with minimal assistance/contact guard assist within 7 day(s). 5.  Patient will perform all aspects of toileting with minimal assistance/contact guard assist within 7 day(s). 6.  Patient will participate in cardiac/sternal upper extremity therapeutic exercise/activities to increase independence with ADLs with supervision/set-up for 5 minutes within 7 day(s). 7.  Patient will perform VAD switchover routine as part of ADL routine (including batteries<>batteries, battery clip<>battery clip, mock SC<>SC) with minimal assistance/contact guard assist within 7 days. Outcome: Progressing Towards Goal   OCCUPATIONAL THERAPY TREATMENT  Patient: Radha Bartlett (53 y.o. male)  Date: 4/22/2022  Diagnosis: HFrEF (heart failure with reduced ejection fraction) ([facility]) [I50.20] <principal problem not specified>  Procedure(s) (LRB):  REDO STERNOTOMY; RIGHT GROIN CUTDOWN FOR CANNULATION;  INSERTION OF LEFT VENTRICULAR ASSIST DEVICE (HMIII); AORTIC VALVE CLOSURE CHEL BY DR. Gordan Snellen. (N/A) 17 Days Post-Op  Precautions: Fall (mindful movement, LVAD)  Chart, occupational therapy assessment, plan of care, and goals were reviewed. ASSESSMENT  Patient continues with skilled OT services and is progressing towards goals. Patient received semi supine in bed and agreeable to working with therapy. Patient had used urinal and required setup for a wipe to complete pericare at bed level. Patient transferred to edge of bed and donned lizbeth sneaker prior to mobility and agreeable to walk over to bathroom to wash hands at sink. Patient then returned to recliner and reviewed emergency scenarios including low battery, clip malfunction. Patient also reviewed items in emergency bag and able to recall 4/4 with verbal cueing for clips. Patient requiring multiple attempts to stand from low surface of the bed with SBA. Patient received on battery power and left on battery power at end of session. Overall patient did well with session and is progressing toward goals. Patient would benefit from skilled OT services during admission to improve independence with self care and functional mobility/transfers. Recommend [facility] to [facility] at this time. Patient is verbalizing and is not demonstrating understanding of mindful-based movements (\"move in the tube\") principles of keeping UEs proximal to ribcage to prevent lateral pull on the sternum during load-bearing activities with visual and verbal cues required for compliance. Current Level of Function Impacting Discharge (ADLs): supervision to SBA for mobility/transfers, mod independent for lower extremity dressing, supervision grooming at sink    Other factors to consider for discharge: new LVAD, prior level of function, family support         PLAN :  Patient continues to benefit from skilled intervention to address the above impairments. Continue treatment per established plan of care to address goals.     Recommend with staff: up to chair for all meals, functional mobility to and from bathroom for toileting    Recommend next OT session: cardiac exercises    Recommendation for discharge: (in order for the patient to meet his/her long term goals)  Occupational therapy at least 2 days/week in the home AND ensure assist and/or supervision for safety with LVAD maintenance    This discharge recommendation:  Has been made in collaboration with the attending provider and/or case management    IF patient discharges home will need the following DME: shower chair       SUBJECTIVE:   Patient stated I'm not usually in the bed but they were doing a duplex.     OBJECTIVE DATA SUMMARY:   Cognitive/Behavioral Status:  Neurologic State: Alert  Orientation Level: Oriented X4  Cognition: Appropriate decision making; Appropriate for age attention/concentration; Appropriate safety awareness             Functional Mobility and Transfers for ADLs:  Bed Mobility:  Rolling: Supervision  Supine to Sit: Supervision  Scooting: Supervision    Transfers:  Sit to Stand: Stand-by assistance; Additional time (multiple trials)  Functional Transfers  Bathroom Mobility: Stand-by assistance  Bed to Chair: Stand-by assistance    Balance:  Sitting: Intact  Standing: Impaired; Without support  Standing - Static: Good  Standing - Dynamic : Good;Fair    ADL Intervention:       Grooming  Position Performed: Standing  Washing Hands: Supervision                   Lower Body Dressing Assistance  Shoes with Cloth Laces: Modified independent  Leg Crossed Method Used: Yes  Position Performed: Seated edge of bed    Toileting  Bladder Hygiene: Set-up (using urinal in bed)         Patient instructed and educated on mindful movement principles based on Move in The Tube concept to include maintaining bilateral elbows close to rib cage when performing any load-bearing activity such as getting in/out of bed, pushing up from a chair, opening a door, or lifting a box. Patient was given a handout with diagrams of each correct/incorrect method of performing each of the above tasks.    Patient instructed on the ability to utilize upper extremities outside the tube when doing any non-load bearing activity such as washing hair/body, brushing teeth, retrieving clothing items, or scratching your back. Patient encouraged to also perform upper extremity exercises \"outside of the tube\" to prevent scar tissue formation around sternal incision site. Patient instructed in detail about activities to heed with caution, allowing pain to be the guide. These activities include but are not limited to: mowing the lawn, riding a bike, walking a dog, lifting a child, workshop hobbies, golfing, sexual activity, vacuuming, fishing, scrubbing the floors, and moving furniture. Patient was given the [address] in the [place] handout to describe each of these activities in detail. May have to adjust home setup to increase ease with items closer to waist height to prevent deep bending and the automatic  of asymmetrical UE WB/pushing for stabilization during bending. Benefit to don clothing tailor sitting and don all clothing while sitting prior to standing. Patient demonstrated lower body dressing with Modified independent. Instruction and indicated understanding on the benefits of loose clothing throughout to accommodate for post surgical swelling, decreased ROM and increased pain. Instruction and indicated understanding the technique of pull over shirt versus front open clothing.          Pain:  None reported    Activity Tolerance:   Good and SpO2 stable on RA    After treatment patient left in no apparent distress:   Sitting in chair and Call bell within reach    COMMUNICATION/COLLABORATION:   The patients plan of care was discussed with: Physical therapist.     KENAN Cortes/L  Time Calculation: 26 mins

## 2022-04-22 NOTE — PROGRESS NOTES
1700: patients daughter did drivel ine dressing change with sterile procedure with minimal verbal que's.

## 2022-04-23 ENCOUNTER — APPOINTMENT (OUTPATIENT)
Dept: GENERAL RADIOLOGY | Age: 77
DRG: 001 | End: 2022-04-23
Attending: NURSE PRACTITIONER
Payer: MEDICARE

## 2022-04-23 LAB
ALBUMIN SERPL-MCNC: 2.8 G/DL (ref 3.5–5)
ALBUMIN/GLOB SERPL: 0.9 {RATIO} (ref 1.1–2.2)
ALP SERPL-CCNC: 134 U/L (ref 45–117)
ALT SERPL-CCNC: 24 U/L (ref 12–78)
ANION GAP SERPL CALC-SCNC: 5 MMOL/L (ref 5–15)
AST SERPL-CCNC: 69 U/L (ref 15–37)
ATRIAL RATE: 0 BPM
BILIRUB SERPL-MCNC: 0.8 MG/DL (ref 0.2–1)
BNP SERPL-MCNC: 3711 PG/ML
BUN SERPL-MCNC: 23 MG/DL (ref 6–20)
BUN/CREAT SERPL: 24 (ref 12–20)
CALCIUM SERPL-MCNC: 8.4 MG/DL (ref 8.5–10.1)
CALCULATED R AXIS, ECG10: 164 DEGREES
CALCULATED T AXIS, ECG11: 180 DEGREES
CHLORIDE SERPL-SCNC: 99 MMOL/L (ref 97–108)
CO2 SERPL-SCNC: 29 MMOL/L (ref 21–32)
CREAT SERPL-MCNC: 0.94 MG/DL (ref 0.7–1.3)
DIAGNOSIS, 93000: NORMAL
ERYTHROCYTE [DISTWIDTH] IN BLOOD BY AUTOMATED COUNT: 21.4 % (ref 11.5–14.5)
GLOBULIN SER CALC-MCNC: 3.1 G/DL (ref 2–4)
GLUCOSE BLD STRIP.AUTO-MCNC: 120 MG/DL (ref 65–117)
GLUCOSE BLD STRIP.AUTO-MCNC: 191 MG/DL (ref 65–117)
GLUCOSE BLD STRIP.AUTO-MCNC: 198 MG/DL (ref 65–117)
GLUCOSE SERPL-MCNC: 87 MG/DL (ref 65–100)
HCT VFR BLD AUTO: 24.3 % (ref 36.6–50.3)
HGB BLD-MCNC: 7.7 G/DL (ref 12.1–17)
INR PPP: 2.8 (ref 0.9–1.1)
LDH SERPL L TO P-CCNC: 199 U/L (ref 85–241)
MAGNESIUM SERPL-MCNC: 2 MG/DL (ref 1.6–2.4)
MCH RBC QN AUTO: 26 PG (ref 26–34)
MCHC RBC AUTO-ENTMCNC: 31.7 G/DL (ref 30–36.5)
MCV RBC AUTO: 82.1 FL (ref 80–99)
NRBC # BLD: 0 K/UL (ref 0–0.01)
NRBC BLD-RTO: 0 PER 100 WBC
PLATELET # BLD AUTO: 270 K/UL (ref 150–400)
PMV BLD AUTO: 9.5 FL (ref 8.9–12.9)
POTASSIUM SERPL-SCNC: 4 MMOL/L (ref 3.5–5.1)
PROT SERPL-MCNC: 5.9 G/DL (ref 6.4–8.2)
PROTHROMBIN TIME: 27.7 SEC (ref 9–11.1)
Q-T INTERVAL, ECG07: 582 MS
QRS DURATION, ECG06: 180 MS
QTC CALCULATION (BEZET), ECG08: 679 MS
RBC # BLD AUTO: 2.96 M/UL (ref 4.1–5.7)
SERVICE CMNT-IMP: ABNORMAL
SODIUM SERPL-SCNC: 133 MMOL/L (ref 136–145)
VENTRICULAR RATE, ECG03: 82 BPM
WBC # BLD AUTO: 6.8 K/UL (ref 4.1–11.1)

## 2022-04-23 PROCEDURE — 85610 PROTHROMBIN TIME: CPT

## 2022-04-23 PROCEDURE — 74011250637 HC RX REV CODE- 250/637: Performed by: NURSE PRACTITIONER

## 2022-04-23 PROCEDURE — 74011250636 HC RX REV CODE- 250/636: Performed by: NURSE PRACTITIONER

## 2022-04-23 PROCEDURE — 65660000001 HC RM ICU INTERMED STEPDOWN

## 2022-04-23 PROCEDURE — 36415 COLL VENOUS BLD VENIPUNCTURE: CPT

## 2022-04-23 PROCEDURE — 77030037442 HC TY LVAD MGMT SYST CMP-B

## 2022-04-23 PROCEDURE — 83880 ASSAY OF NATRIURETIC PEPTIDE: CPT

## 2022-04-23 PROCEDURE — 82962 GLUCOSE BLOOD TEST: CPT

## 2022-04-23 PROCEDURE — 74011000250 HC RX REV CODE- 250: Performed by: NURSE PRACTITIONER

## 2022-04-23 PROCEDURE — P9047 ALBUMIN (HUMAN), 25%, 50ML: HCPCS | Performed by: NURSE PRACTITIONER

## 2022-04-23 PROCEDURE — 71045 X-RAY EXAM CHEST 1 VIEW: CPT

## 2022-04-23 PROCEDURE — 85027 COMPLETE CBC AUTOMATED: CPT

## 2022-04-23 PROCEDURE — 83735 ASSAY OF MAGNESIUM: CPT

## 2022-04-23 PROCEDURE — 99233 SBSQ HOSP IP/OBS HIGH 50: CPT | Performed by: NURSE PRACTITIONER

## 2022-04-23 PROCEDURE — 83615 LACTATE (LD) (LDH) ENZYME: CPT

## 2022-04-23 PROCEDURE — 74011250637 HC RX REV CODE- 250/637: Performed by: INTERNAL MEDICINE

## 2022-04-23 PROCEDURE — 93750 INTERROGATION VAD IN PERSON: CPT | Performed by: NURSE PRACTITIONER

## 2022-04-23 PROCEDURE — 80053 COMPREHEN METABOLIC PANEL: CPT

## 2022-04-23 RX ADMIN — FENTANYL CITRATE 25 MCG: 0.05 INJECTION, SOLUTION INTRAMUSCULAR; INTRAVENOUS at 12:02

## 2022-04-23 RX ADMIN — MIRTAZAPINE 7.5 MG: 15 TABLET, FILM COATED ORAL at 21:34

## 2022-04-23 RX ADMIN — FAMOTIDINE 20 MG: 20 TABLET ORAL at 08:28

## 2022-04-23 RX ADMIN — HYDRALAZINE HYDROCHLORIDE 50 MG: 50 TABLET, FILM COATED ORAL at 11:56

## 2022-04-23 RX ADMIN — GABAPENTIN 200 MG: 100 CAPSULE ORAL at 16:39

## 2022-04-23 RX ADMIN — SUCRALFATE 1 G: 1 TABLET ORAL at 21:34

## 2022-04-23 RX ADMIN — SUCRALFATE 1 G: 1 TABLET ORAL at 16:39

## 2022-04-23 RX ADMIN — GABAPENTIN 200 MG: 100 CAPSULE ORAL at 08:28

## 2022-04-23 RX ADMIN — HYDRALAZINE HYDROCHLORIDE 50 MG: 50 TABLET, FILM COATED ORAL at 20:36

## 2022-04-23 RX ADMIN — SUCRALFATE 1 G: 1 TABLET ORAL at 08:29

## 2022-04-23 RX ADMIN — FENTANYL CITRATE 25 MCG: 0.05 INJECTION, SOLUTION INTRAMUSCULAR; INTRAVENOUS at 16:39

## 2022-04-23 RX ADMIN — SODIUM CHLORIDE, PRESERVATIVE FREE 10 ML: 5 INJECTION INTRAVENOUS at 06:00

## 2022-04-23 RX ADMIN — ALBUMIN (HUMAN) 12.5 G: 0.25 INJECTION, SOLUTION INTRAVENOUS at 08:28

## 2022-04-23 RX ADMIN — FAMOTIDINE 20 MG: 20 TABLET ORAL at 21:34

## 2022-04-23 RX ADMIN — COLCHICINE 0.6 MG: 0.6 TABLET, FILM COATED ORAL at 08:28

## 2022-04-23 RX ADMIN — ASPIRIN 81 MG CHEWABLE TABLET 81 MG: 81 TABLET CHEWABLE at 08:28

## 2022-04-23 RX ADMIN — LOSARTAN POTASSIUM 25 MG: 25 TABLET, FILM COATED ORAL at 08:28

## 2022-04-23 RX ADMIN — ALBUMIN (HUMAN) 12.5 G: 0.25 INJECTION, SOLUTION INTRAVENOUS at 19:16

## 2022-04-23 RX ADMIN — POLYETHYLENE GLYCOL 3350 17 G: 17 POWDER, FOR SOLUTION ORAL at 08:29

## 2022-04-23 RX ADMIN — EPLERENONE 25 MG: 25 TABLET, FILM COATED ORAL at 08:28

## 2022-04-23 RX ADMIN — WARFARIN SODIUM 6 MG: 5 TABLET ORAL at 19:16

## 2022-04-23 RX ADMIN — SUCRALFATE 1 G: 1 TABLET ORAL at 11:56

## 2022-04-23 RX ADMIN — AMIODARONE HYDROCHLORIDE 200 MG: 200 TABLET ORAL at 19:16

## 2022-04-23 RX ADMIN — BUMETANIDE 2 MG: 0.25 INJECTION, SOLUTION INTRAMUSCULAR; INTRAVENOUS at 19:15

## 2022-04-23 RX ADMIN — AMIODARONE HYDROCHLORIDE 200 MG: 200 TABLET ORAL at 08:28

## 2022-04-23 RX ADMIN — BUMETANIDE 2 MG: 0.25 INJECTION, SOLUTION INTRAMUSCULAR; INTRAVENOUS at 08:28

## 2022-04-23 RX ADMIN — HYDRALAZINE HYDROCHLORIDE 50 MG: 50 TABLET, FILM COATED ORAL at 04:02

## 2022-04-23 RX ADMIN — SODIUM CHLORIDE, PRESERVATIVE FREE 10 ML: 5 INJECTION INTRAVENOUS at 21:36

## 2022-04-23 RX ADMIN — POTASSIUM CHLORIDE 20 MEQ: 750 TABLET, EXTENDED RELEASE ORAL at 08:28

## 2022-04-23 RX ADMIN — GABAPENTIN 200 MG: 100 CAPSULE ORAL at 21:34

## 2022-04-23 NOTE — PROGRESS NOTES
[facility] in [facility]  Inpatient Progress Note      Patient name: Marcela Willard  Patient : 1945  Patient MRN: [id number]  Consulting MD: Wing Robel MD  Date of service: 22    REASON FOR REFERRAL:  Management of LVAD    PLAN OF CARE:  · 69 y.o. male with severe ischemic cardiomyopathy, LVEF 15-20% s/p HM3 LVAD implant as DT on 22 by Dr. Germain Sharif c/b intraoperative bleeding requiring multiple blood products and subsequent RV failure with severe TR on high dose dobutamine IV and sternal wound infection on antibiotics  · Plan for remainder of hospitalization includes:  · Surveillance weekly echos to determine if RV function improves and TV coapts  · Diuresis and removal of chest tubes when ready  · PT/OT/Nutrition    PLAN:  POD 17  TTE shows severe TR but improved RV function; continue to optimize medically   Repeat TTE on Monday   Continue speed 4800rpms, low speed 4400  Continue dobutamine 6 mcg/kg/min-adjust to current weight- no weaning at this time due to severe TR  Continue hydralazine 50 mg PO q8h   Continue  amiodarone 200 mg PO twice daily ( last dose )  Continue losartan 25 mg PO daily   Cont Eplerenone 25mg daily  Continue Bumex 2mg IV BID  S/p Venofer 200mg x 2 doses   Completed course of cefepime for sternal incision   Monitor MAP via doppler  Driveline dressing changes daily for now until 5/3/22  Wound culture negative   NANDINI hose    Needs EKG weekly on   Continue warfarin, goal INR 2-3; 6mg tonight   ASA 81mg daily  Continue colchicine for pericarditis   Continue gabapentin 200 mg PO TID   IV fentanyl prn (pt with hx anaphylaxis to oxycodone)  Continue bowel regimen as needed   Check Orthostatics today   Pulmonary toilet  Monitor CT on water seal   CPAP QHS  Advanced care plan forms on file   Strict I/O, daily weights, Na+ restricted diet   Continue VAD education   Equipment ordered, will set goal for home next week     LAST 24 HOURS:  No acute events overnight  Net neg -1.8L  VSS and LVAD flows stable- no acute alarms on VAD  Creatinine stable 0.9  T Bili 0.8   PBNP 3700  Improved CT outpt   Coughing overnight       REVIEW OF SYSTEMS:  Review of Systems   Constitutional: Negative for chills, fever, malaise/fatigue and weight loss. Didn't sleep well    Respiratory: Positive for cough. Negative for shortness of breath. Cardiovascular: Negative for chest pain, palpitations, orthopnea and leg swelling. Gastrointestinal: Negative for abdominal pain, nausea and vomiting. Neurological: Negative for dizziness and weakness. Psychiatric/Behavioral: Negative. LIFE GOALS:  Patient's personal goals include: getting stronger and going home soon  Important upcoming milestones or family events: The patient identifies the following as important for living well: being independent, being at home, enjoying family time        OBJECTIVE:  PHYSICAL EXAM:  Visit Vitals  /88   Pulse 75   Temp 98.2 °F (36.8 °C)   Resp 16   Ht 6' 1\" (1.854 m)   Wt 190 lb (86.2 kg)   SpO2 97%   BMI 25.07 kg/m²       LVAD INTERROGATION:  Device interrogated in person  Device function normal, normal flow, no events  LVAD   Pump Speed (RPM): 4800  Pump Flow (LPM): 3.4  MAP: 84  PI (Pulsitility Index): 5.8  Power: 3.1   Test: No  Back Up  at Bedside & Labeled: Yes  Power Module Test: No  Driveline Site Care: No  Driveline Dressing: Clean, Dry, and Intact  MAP in Therapeutic Range (Outpatient): Yes  Testing  Alarms Reviewed: Yes  Back up SC speed: 4800  Back up Low Speed Limit: 4400  Emergency Equipment with Patient?: Yes  Emergency procedures reviewed?: Yes  Drive line site inspected?: Yes  Drive line intergrity inspected?: Yes  Drive line dressing changed?: No      Physical Exam  Vitals reviewed. Constitutional:       General: He is not in acute distress. Appearance: Normal appearance. Cardiovascular:      Rate and Rhythm: Normal rate and regular rhythm. Comments: LVAD Hum noted on auscultation  Pulmonary:      Effort: Pulmonary effort is normal. No respiratory distress. Abdominal:      General: Bowel sounds are normal. There is no distension. Palpations: Abdomen is soft. Tenderness: There is no abdominal tenderness. Musculoskeletal:      Right lower le+ Pitting Edema present. Left lower le+ Pitting Edema present. Comments: Improved BLE edema    Skin:     General: Skin is warm and dry. Capillary Refill: Capillary refill takes less than 2 seconds. Neurological:      General: No focal deficit present. Mental Status: He is alert and oriented to person, place, and time. Psychiatric:         Mood and Affect: Mood normal.         Behavior: Behavior normal.         Thought Content: Thought content normal.         Judgment: Judgment normal.            CARDIAC IMAGING AND DIAGNOSTICS REVIEWED:  Echo 22- LVEF 15-20%, severe TR, TAPSE 0.5cm, RVIDd 6cm   Echo (22)    Left Ventricle: Left ventricle size is normal. Moderately increased wall thickness. Findings consistent with concentric remodeling. Severe global hypokinesis present. Severely reduced left ventricular systolic function with a visually estimated EF of 10 -15%. 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   Aortic Valve: Moderate sclerosis of the aortic valve cusp.   Mitral Valve: Moderately thickened leaflet. Mild transvalvular regurgitation.   Tricuspid Valve: Severe transvalvular regurgitation.   Right Atrium: Right atrium is severely dilated.     Echo (3/2/22)   · LV 10-15%  · Mod-severe MR      Echo (21):  · LV 15-20%.    · Mod-severe, MR, mod-TR     Echo (21)  · LV: Estimated LVEF is 20 - 25%. Normal wall thickness. Mildly dilated left ventricle. Severely and globally reduced systolic function.  Severe (grade 4) left ventricular diastolic dysfunction. · LA: Severely dilated left atrium. · RA: Severely dilated right atrium. · MV: Moderate mitral valve regurgitation is present. · TV: Moderate tricuspid valve regurgitation is present. · AO: Mild aortic root dilatation. · RV: Pacer/ICD present. · LVED 6.2cm     EKG (5/20/21) SB with 1degree AV block, QRS 116ms     LHC (5/24/21) Native Coronaries: LM - moderate to severe distal disease 50%. % prox occluded (), heavily calcified, LCx: 80% proximal stenosis. OM1 is  (seen filling faintly via collaterals). OM2 99% stenosis. RCA: 100% proximal occluded.  LIMA to LAD: patent, supplies only a diagonal branch (probably D2). The LAD distal to the bifurcation is 100% occluded () and fills via right to left collaterals off the SVG to RCA. SVG to R-PDA: patent with moderate diffuse irregularities but no obstructive disease in the graft.    No appealing interventional targets.      NST as above     ICD Interrogation (11/12/2021) [employer] VVI, thoracic impedence trending up, no events, normal device function and good battery  ICD interrogation (5/12/21) [employer] single lead, no events, normal device function and good battery     HEMODYNAMICS:  RHC 5/24/21 CI 1.97, PA 33/9/17, RA 1, PCWP 6- off milrinone   CPEST not done     Patient underwent a 6 minute walk test 11/12/2021     6 Min Walk Report 11/12/2021 7/6/2021 5/20/2021   (PRE) HR 88 98 74   (PRE) O2 Sat 100 - 99   (POST)  - 81   (POST) O2 Sat 99 98% on Ra 99   Distance in Meters 386.58 - 1158. 24       OTHER IMAGING:  CXR (6/17/21) clear  CT 5/21/21 1. Irregular pulmonary nodule in the left upper lobe measuring 2 cm, suspicious for primary pulmonary malignancy. 2. Additional 6 mm left upper lobe pulmonary nodule. 3. Severe calcific atherosclerosis of the coronary arteries and abdominal aorta. No aneurysm. 4. Cardiomegaly.  5. No acute abnormality within the chest, abdomen, or pelvis. LABORATORY RESULTS:     Labs Latest Ref Rng & Units 4/23/2022 4/22/2022 4/21/2022 4/20/2022 4/19/2022 4/18/2022 4/17/2022   WBC 4.1 - 11.1 K/uL 6.8 6.9 7.0 8.2 9.0 9.6 9.3   RBC 4.10 - 5.70 M/uL 2.96(L) 2.89(L) 2.90(L) 3.02(L) 3.09(L) 3.10(L) 2.97(L)   Hemoglobin 12.1 - 17.0 g/dL 7. 7(L) 7. 5(L) 7. 5(L) 7. 9(L) 8.0(L) 8. 1(L) 7. 7(L)   Hematocrit 36.6 - 50.3 % 24. 3(L) 24. 1(L) 23. 5(L) 24. 5(L) 25. 3(L) 25. 1(L) 23. 9(L)   MCV 80.0 - 99.0 FL 82.1 83.4 81.0 81.1 81.9 81.0 80.5   Platelets 037 - 866 K/uL 270 274 268 256 240 243 205   Lymphocytes 12 - 49 % - - - - - - -   Monocytes 5 - 13 % - - - - - - -   Eosinophils 0 - 7 % - - - - - - -   Basophils 0 - 1 % - - - - - - -   Bands 0 - 6 % - - - - - - -   Albumin 3.5 - 5.0 g/dL 2. 8(L) 2. 8(L) 2. 5(L) 2. 6(L) 2. 6(L) 2. 6(L) 2. 6(L)   Calcium 8.5 - 10.1 MG/DL 8.4(L) 8.4(L) 7. 9(L) 7. 8(L) 8.0(L) 8. 3(L) 8.4(L)   Glucose 65 - 100 mg/dL 87 114(H) 131(H) 123(H) 191(H) 153(H) 174(H)   BUN 6 - 20 MG/DL 23(H) 24(H) 18 16 18 26(H) 23(H)   Creatinine 0.70 - 1.30 MG/DL 0.94 0.90 0.83 0.76 0.80 0.89 0.84   Sodium 136 - 145 mmol/L 133(L) 132(L) 135(L) 133(L) 131(L) 127(L) 128(L)   Potassium 3.5 - 5.1 mmol/L 4.0 4.2 3.6 3.6 4.4 5.0 4.8   TSH 0.36 - 3.74 uIU/mL - - - - - - -   LDH 85 - 241 U/L 199 216 168 196 206 192 196   Some recent data might be hidden     Lab Results   Component Value Date/Time    TSH 2.26 04/10/2022 03:00 AM    TSH 1.68 03/01/2022 11:50 AM    TSH 1.47 05/26/2021 10:44 PM    TSH 1.97 05/20/2021 04:28 PM    TSH 1.41 02/09/2021 03:05 PM    TSH 1.740 08/14/2018 09:58 AM    TSH 1.710 10/18/2017 12:00 AM         HISTORY:  PAST MEDICAL HISTORY:  Past Medical History:   Diagnosis Date    CAD (coronary artery disease) '90  '97, '13 x 2    [place], code [place] in 2013 [place] [facility] [place]    Calculus of kidney     Colonic polyps     Congestive heart failure, unspecified     Diabetes ([facility] [address]     Gastritis     Hypercholesterolemia     Sleep apnea        PAST SURGICAL HISTORY:  Past Surgical History:   Procedure Laterality Date    COLONOSCOPY  04/06/2011    16, due 21    COLONOSCOPY N/A 3/2/2022    COLONOSCOPY    :- performed by Natalie Em [facility] at [facility] ENDOSCOPY    ENDOSCOPY, COLON, DIAGNOSTIC      11, due 16    HX CORONARY ARTERY BYPASS GRAFT  8/22/12    x 4 vessel by MISYS Ramirez    HX HEENT      LASIK    HX PACEMAKER PLACEMENT  1/30/13    RI CARDIAC SURG PROCEDURE UNLIST  2012    x 4 vessels       FAMILY HISTORY:  Family History   Problem Relation Age of Onset    Heart Disease Mother         MI    Heart Disease Father         CAD & PVD    Lung Disease Father     Cancer Father         lung    Diabetes Maternal Grandmother     Heart Disease Other         CAD    Stroke Sister        SOCIAL HISTORY:  Social History     Socioeconomic History    Marital status:    Tobacco Use    Smoking status: Never Smoker    Smokeless tobacco: Never Used   Vaping Use    Vaping Use: Never used   Substance and Sexual Activity    Alcohol use:  Yes     Alcohol/week: 0.0 standard drinks     Comment:  VERY RARE    Drug use: No       ALLERGY:  Allergies   Allergen Reactions    Oxycodone Anaphylaxis    Pcn [Penicillins] Hives        CURRENT MEDICATIONS:    Current Facility-Administered Medications:     warfarin (COUMADIN) tablet 6 mg, 6 mg, Oral, ONCE, Terry, Danica B, NP    eplerenone (INSPRA) tablet 25 mg, 25 mg, Oral, DAILY, Terry, Danica B, NP, 25 mg at 04/22/22 0939    DOBUTamine (DOBUTREX) 1,000 mg/250 mL (4,000 mcg/mL) infusion, 6 mcg/kg/min, IntraVENous, CONTINUOUS, Terry, Danica B, NP, Last Rate: 7.8 mL/hr at 04/22/22 1000, 6 mcg/kg/min at 04/22/22 1000    potassium chloride SR (KLOR-CON 10) tablet 20 mEq, 20 mEq, Oral, DAILY, Terry, Danica B, NP, 20 mEq at 04/22/22 0946    guaiFENesin (ROBITUSSIN) 100 mg/5 mL oral liquid 100 mg, 100 mg, Oral, Q4H PRN, Jennifer MONTERO MD, 100 mg at 04/22/22 2314    losartan (COZAAR) tablet 25 mg, 25 mg, Oral, DAILY, Terry, Danica B, NP, 25 mg at 04/22/22 0938    bumetanide (BUMEX) injection 2 mg, 2 mg, IntraVENous, BID, TerryDanica B, NP, 2 mg at 04/22/22 1754    polyethylene glycol (MIRALAX) packet 17 g, 17 g, Oral, DAILY, Gely Keila B, NP, 17 g at 04/22/22 6661    senna-docusate (PERICOLACE) 8.6-50 mg per tablet 1 Tablet, 1 Tablet, Oral, DAILY PRN, Sergio Martel NP    oxymetazoline (AFRIN) 0.05 % nasal spray 2 Spray, 2 Spray, Both Nostrils, BID PRN, Gely JACOBSON, NP    hydrALAZINE (APRESOLINE) tablet 50 mg, 50 mg, Oral, Q8H, Millie Wong, NP, 50 mg at 04/23/22 0402    amiodarone (CORDARONE) tablet 200 mg, 200 mg, Oral, BID, Lydia Wong NP, 200 mg at 04/22/22 1754    colchicine tablet 0.6 mg, 0.6 mg, Oral, DAILY, Lydia Wong, NP, 0.6 mg at 04/22/22 0940    alteplase (CATHFLO) 1 mg in sterile water (preservative free) 1 mL injection, 1 mg, InterCATHeter, PRN, Brian Castelan MD, 1 mg at 04/22/22 2310    bisacodyL (DULCOLAX) suppository 10 mg, 10 mg, Rectal, DAILY PRN, Maria Del Carmen Mortensen MD, 10 mg at 04/12/22 1533    gabapentin (NEURONTIN) capsule 200 mg, 200 mg, Oral, TID, Lydia Wong, NP, 200 mg at 04/22/22 2219    albumin human 25% (BUMINATE) solution 12.5 g, 12.5 g, IntraVENous, BID, Lydia Wong, NP, 12.5 g at 04/22/22 1758    famotidine (PEPCID) tablet 20 mg, 20 mg, Oral, Q12H, Maria Del Carmen Mortensen MD, 20 mg at 04/22/22 2219    mirtazapine (REMERON) tablet 7.5 mg, 7.5 mg, Oral, QHS, Danica Stewart NP, 7.5 mg at 04/22/22 2219    lidocaine 4 % patch 1 Patch, 1 Patch, TransDERmal, Q24H, Danica Stewart NP, 1 Patch at 04/22/22 1300    Warfarin NP/MD dosing, , Other, PRN, Danica Stewart, NP    melatonin tablet 3 mg, 3 mg, Oral, QHS PRN, Cheyenne Grier MD, 3 mg at 04/20/22 2127    hydrALAZINE (APRESOLINE) 20 mg/mL injection 10 mg, 10 mg, IntraVENous, Q6H PRN, Gely JACOBSON NP, 10 mg at 04/18/22 1809    aspirin chewable tablet 81 mg, 81 mg, Oral, DAILY, Sergio Martel NP, 81 mg at 04/22/22 9528    hydrALAZINE (APRESOLINE) 20 mg/mL injection 5 mg, 5 mg, IntraVENous, Q6H PRN, Gely JACOBSON NP, 5 mg at 04/13/22 3092    sodium chloride (NS) flush 5-40 mL, 5-40 mL, IntraVENous, Q8H, Lydia Wong NP, 10 mL at 04/22/22 2200    sodium chloride (NS) flush 5-40 mL, 5-40 mL, IntraVENous, PRN, Alisa Wong NP    0.9% sodium chloride infusion, 9 mL/hr, IntraVENous, CONTINUOUS, Lydia Wong NP, Last Rate: 3 mL/hr at 04/20/22 0513, 3 mL/hr at 04/20/22 0513    acetaminophen (TYLENOL) tablet 650 mg, 650 mg, Oral, Q6H PRN, Alisa Wong NP, 650 mg at 04/21/22 1306    naloxone (NARCAN) injection 0.4 mg, 0.4 mg, IntraVENous, PRN, Alisa Wong NP    ondansetron (ZOFRAN) injection 4 mg, 4 mg, IntraVENous, Q4H PRN, Lydia Wong NP, 4 mg at 04/11/22 2139    albuterol-ipratropium (DUO-NEB) 2.5 MG-0.5 MG/3 ML, 3 mL, Nebulization, Q6H PRN, Alisa Wong NP    glucose chewable tablet 16 g, 4 Tablet, Oral, PRN, Alisa Wong NP    glucagon (GLUCAGEN) injection 1 mg, 1 mg, IntraMUSCular, PRN, Alisa Wong NP    insulin lispro (HUMALOG) injection, , SubCUTAneous, AC&HS, Alisa Wong NP, 1 Units at 04/22/22 2218    sucralfate (CARAFATE) tablet 1 g, 1 g, Oral, AC&HS, Alisa Wong NP, 1 g at 04/22/22 2219    0.45% sodium chloride infusion, 10 mL/hr, IntraVENous, CONTINUOUS, Richardson Najera MD, Stopped at 04/10/22 0730    fentaNYL citrate (PF) injection 25 mcg, 25 mcg, IntraVENous, Q2H PRN, Alisa Wong NP, 25 mcg at 04/22/22 1551    PATIENT CARE TEAM:  Patient Care Team:  Christy Feliz MD as PCP - General (Internal Medicine)  Christy Feliz MD as PCP - REHABILITATION [facility] Provider  Sarah Umaña MD as Physician (Cardiology)  Demarcus Mane MD as Physician (Gastroenterology)  James Hurtado MD as Physician (Orthopedic Surgery)  Merissa Hall MD as Physician (Ophthalmology)  Mendel Areola, MD (Cardiology)  Aga Amaro MD (Cardiology)       Thank you for allowing us to participate in this patient's care.       Bill Khan NP   [address] [place] [address], [address]  Phone: [phone]

## 2022-04-24 ENCOUNTER — APPOINTMENT (OUTPATIENT)
Dept: GENERAL RADIOLOGY | Age: 77
DRG: 001 | End: 2022-04-24
Attending: NURSE PRACTITIONER
Payer: MEDICARE

## 2022-04-24 LAB
ALBUMIN SERPL-MCNC: 2.6 G/DL (ref 3.5–5)
ALBUMIN/GLOB SERPL: 0.8 {RATIO} (ref 1.1–2.2)
ALP SERPL-CCNC: 140 U/L (ref 45–117)
ALT SERPL-CCNC: 23 U/L (ref 12–78)
ANION GAP SERPL CALC-SCNC: 8 MMOL/L (ref 5–15)
AST SERPL-CCNC: 66 U/L (ref 15–37)
BILIRUB SERPL-MCNC: 0.7 MG/DL (ref 0.2–1)
BNP SERPL-MCNC: 3224 PG/ML
BUN SERPL-MCNC: 22 MG/DL (ref 6–20)
BUN/CREAT SERPL: 27 (ref 12–20)
CALCIUM SERPL-MCNC: 8 MG/DL (ref 8.5–10.1)
CHLORIDE SERPL-SCNC: 99 MMOL/L (ref 97–108)
CO2 SERPL-SCNC: 27 MMOL/L (ref 21–32)
CREAT SERPL-MCNC: 0.83 MG/DL (ref 0.7–1.3)
ERYTHROCYTE [DISTWIDTH] IN BLOOD BY AUTOMATED COUNT: 20.5 % (ref 11.5–14.5)
GLOBULIN SER CALC-MCNC: 3.3 G/DL (ref 2–4)
GLUCOSE BLD STRIP.AUTO-MCNC: 126 MG/DL (ref 65–117)
GLUCOSE BLD STRIP.AUTO-MCNC: 135 MG/DL (ref 65–117)
GLUCOSE BLD STRIP.AUTO-MCNC: 180 MG/DL (ref 65–117)
GLUCOSE BLD STRIP.AUTO-MCNC: 229 MG/DL (ref 65–117)
GLUCOSE SERPL-MCNC: 105 MG/DL (ref 65–100)
HCT VFR BLD AUTO: 23.9 % (ref 36.6–50.3)
HGB BLD-MCNC: 7.5 G/DL (ref 12.1–17)
INR PPP: 2.6 (ref 0.9–1.1)
LDH SERPL L TO P-CCNC: 218 U/L (ref 85–241)
MAGNESIUM SERPL-MCNC: 2.1 MG/DL (ref 1.6–2.4)
MCH RBC QN AUTO: 26.6 PG (ref 26–34)
MCHC RBC AUTO-ENTMCNC: 31.4 G/DL (ref 30–36.5)
MCV RBC AUTO: 84.8 FL (ref 80–99)
NRBC # BLD: 0 K/UL (ref 0–0.01)
NRBC BLD-RTO: 0 PER 100 WBC
PLATELET # BLD AUTO: 248 K/UL (ref 150–400)
PMV BLD AUTO: 9.5 FL (ref 8.9–12.9)
POTASSIUM SERPL-SCNC: 4.3 MMOL/L (ref 3.5–5.1)
PROCALCITONIN SERPL-MCNC: 0.33 NG/ML
PROT SERPL-MCNC: 5.9 G/DL (ref 6.4–8.2)
PROTHROMBIN TIME: 26 SEC (ref 9–11.1)
RBC # BLD AUTO: 2.82 M/UL (ref 4.1–5.7)
SERVICE CMNT-IMP: ABNORMAL
SODIUM SERPL-SCNC: 134 MMOL/L (ref 136–145)
WBC # BLD AUTO: 6.1 K/UL (ref 4.1–11.1)

## 2022-04-24 PROCEDURE — 74011000250 HC RX REV CODE- 250: Performed by: NURSE PRACTITIONER

## 2022-04-24 PROCEDURE — 82962 GLUCOSE BLOOD TEST: CPT

## 2022-04-24 PROCEDURE — 83735 ASSAY OF MAGNESIUM: CPT

## 2022-04-24 PROCEDURE — 74011250637 HC RX REV CODE- 250/637: Performed by: NURSE PRACTITIONER

## 2022-04-24 PROCEDURE — 85610 PROTHROMBIN TIME: CPT

## 2022-04-24 PROCEDURE — 84145 PROCALCITONIN (PCT): CPT

## 2022-04-24 PROCEDURE — 99233 SBSQ HOSP IP/OBS HIGH 50: CPT | Performed by: NURSE PRACTITIONER

## 2022-04-24 PROCEDURE — P9047 ALBUMIN (HUMAN), 25%, 50ML: HCPCS | Performed by: NURSE PRACTITIONER

## 2022-04-24 PROCEDURE — 93750 INTERROGATION VAD IN PERSON: CPT | Performed by: NURSE PRACTITIONER

## 2022-04-24 PROCEDURE — 71045 X-RAY EXAM CHEST 1 VIEW: CPT

## 2022-04-24 PROCEDURE — 80053 COMPREHEN METABOLIC PANEL: CPT

## 2022-04-24 PROCEDURE — 77030037442 HC TY LVAD MGMT SYST CMP-B

## 2022-04-24 PROCEDURE — 65660000001 HC RM ICU INTERMED STEPDOWN

## 2022-04-24 PROCEDURE — 74011636637 HC RX REV CODE- 636/637: Performed by: NURSE PRACTITIONER

## 2022-04-24 PROCEDURE — 36415 COLL VENOUS BLD VENIPUNCTURE: CPT

## 2022-04-24 PROCEDURE — 85027 COMPLETE CBC AUTOMATED: CPT

## 2022-04-24 PROCEDURE — 74011250636 HC RX REV CODE- 250/636: Performed by: NURSE PRACTITIONER

## 2022-04-24 PROCEDURE — 83880 ASSAY OF NATRIURETIC PEPTIDE: CPT

## 2022-04-24 PROCEDURE — 83615 LACTATE (LD) (LDH) ENZYME: CPT

## 2022-04-24 PROCEDURE — 74011250637 HC RX REV CODE- 250/637: Performed by: INTERNAL MEDICINE

## 2022-04-24 RX ORDER — WARFARIN 4 MG/1
8 TABLET ORAL ONCE
Status: COMPLETED | OUTPATIENT
Start: 2022-04-24 | End: 2022-04-24

## 2022-04-24 RX ORDER — DOCUSATE SODIUM 100 MG/1
100 CAPSULE, LIQUID FILLED ORAL DAILY
Status: DISCONTINUED | OUTPATIENT
Start: 2022-04-24 | End: 2022-05-06 | Stop reason: HOSPADM

## 2022-04-24 RX ORDER — ADHESIVE BANDAGE
30 BANDAGE TOPICAL
Status: ACTIVE | OUTPATIENT
Start: 2022-04-24 | End: 2022-04-24

## 2022-04-24 RX ADMIN — SUCRALFATE 1 G: 1 TABLET ORAL at 21:18

## 2022-04-24 RX ADMIN — BUMETANIDE 2 MG: 0.25 INJECTION, SOLUTION INTRAMUSCULAR; INTRAVENOUS at 09:13

## 2022-04-24 RX ADMIN — HYDRALAZINE HYDROCHLORIDE 50 MG: 50 TABLET, FILM COATED ORAL at 13:09

## 2022-04-24 RX ADMIN — FAMOTIDINE 20 MG: 20 TABLET ORAL at 09:13

## 2022-04-24 RX ADMIN — GABAPENTIN 200 MG: 100 CAPSULE ORAL at 21:24

## 2022-04-24 RX ADMIN — COLCHICINE 0.6 MG: 0.6 TABLET, FILM COATED ORAL at 09:13

## 2022-04-24 RX ADMIN — BUMETANIDE 2 MG: 0.25 INJECTION, SOLUTION INTRAMUSCULAR; INTRAVENOUS at 18:23

## 2022-04-24 RX ADMIN — LOSARTAN POTASSIUM 25 MG: 25 TABLET, FILM COATED ORAL at 09:13

## 2022-04-24 RX ADMIN — AMIODARONE HYDROCHLORIDE 200 MG: 200 TABLET ORAL at 18:23

## 2022-04-24 RX ADMIN — SODIUM CHLORIDE, PRESERVATIVE FREE 10 ML: 5 INJECTION INTRAVENOUS at 21:16

## 2022-04-24 RX ADMIN — SODIUM CHLORIDE, PRESERVATIVE FREE 10 ML: 5 INJECTION INTRAVENOUS at 13:10

## 2022-04-24 RX ADMIN — HYDRALAZINE HYDROCHLORIDE 50 MG: 50 TABLET, FILM COATED ORAL at 21:30

## 2022-04-24 RX ADMIN — SUCRALFATE 1 G: 1 TABLET ORAL at 13:09

## 2022-04-24 RX ADMIN — ALBUMIN (HUMAN) 12.5 G: 0.25 INJECTION, SOLUTION INTRAVENOUS at 18:23

## 2022-04-24 RX ADMIN — EPLERENONE 25 MG: 25 TABLET, FILM COATED ORAL at 09:13

## 2022-04-24 RX ADMIN — ASPIRIN 81 MG CHEWABLE TABLET 81 MG: 81 TABLET CHEWABLE at 09:13

## 2022-04-24 RX ADMIN — SUCRALFATE 1 G: 1 TABLET ORAL at 18:23

## 2022-04-24 RX ADMIN — GABAPENTIN 200 MG: 100 CAPSULE ORAL at 18:32

## 2022-04-24 RX ADMIN — AMIODARONE HYDROCHLORIDE 200 MG: 200 TABLET ORAL at 09:13

## 2022-04-24 RX ADMIN — ALBUMIN (HUMAN) 12.5 G: 0.25 INJECTION, SOLUTION INTRAVENOUS at 09:12

## 2022-04-24 RX ADMIN — GABAPENTIN 200 MG: 100 CAPSULE ORAL at 09:13

## 2022-04-24 RX ADMIN — HYDRALAZINE HYDROCHLORIDE 10 MG: 20 INJECTION INTRAMUSCULAR; INTRAVENOUS at 14:15

## 2022-04-24 RX ADMIN — OXYMETAZOLINE HCL 2 SPRAY: 0.05 SPRAY NASAL at 16:12

## 2022-04-24 RX ADMIN — FENTANYL CITRATE 25 MCG: 0.05 INJECTION, SOLUTION INTRAMUSCULAR; INTRAVENOUS at 10:16

## 2022-04-24 RX ADMIN — SUCRALFATE 1 G: 1 TABLET ORAL at 07:08

## 2022-04-24 RX ADMIN — POLYETHYLENE GLYCOL 3350 17 G: 17 POWDER, FOR SOLUTION ORAL at 09:13

## 2022-04-24 RX ADMIN — FENTANYL CITRATE 25 MCG: 0.05 INJECTION, SOLUTION INTRAMUSCULAR; INTRAVENOUS at 21:16

## 2022-04-24 RX ADMIN — WARFARIN SODIUM 8 MG: 4 TABLET ORAL at 18:23

## 2022-04-24 RX ADMIN — FAMOTIDINE 20 MG: 20 TABLET ORAL at 21:16

## 2022-04-24 RX ADMIN — FENTANYL CITRATE 25 MCG: 0.05 INJECTION, SOLUTION INTRAMUSCULAR; INTRAVENOUS at 13:21

## 2022-04-24 RX ADMIN — SENNOSIDES AND DOCUSATE SODIUM 1 TABLET: 8.6; 5 TABLET ORAL at 09:13

## 2022-04-24 RX ADMIN — MIRTAZAPINE 7.5 MG: 15 TABLET, FILM COATED ORAL at 21:17

## 2022-04-24 RX ADMIN — DOCUSATE SODIUM 100 MG: 100 CAPSULE, LIQUID FILLED ORAL at 13:09

## 2022-04-24 RX ADMIN — Medication 2 UNITS: at 13:10

## 2022-04-24 RX ADMIN — HYDRALAZINE HYDROCHLORIDE 50 MG: 50 TABLET, FILM COATED ORAL at 04:05

## 2022-04-24 RX ADMIN — SODIUM CHLORIDE, PRESERVATIVE FREE 10 ML: 5 INJECTION INTRAVENOUS at 07:04

## 2022-04-24 RX ADMIN — SODIUM CHLORIDE, PRESERVATIVE FREE 10 ML: 5 INJECTION INTRAVENOUS at 09:14

## 2022-04-24 RX ADMIN — POTASSIUM CHLORIDE 20 MEQ: 750 TABLET, EXTENDED RELEASE ORAL at 09:13

## 2022-04-24 NOTE — PROGRESS NOTES
[address]  Inpatient Progress Note      Patient name: Elijah Webber  Patient : 1945  Patient MRN: [id number]  Consulting MD: Pratibha Mendosa MD  Date of service: 22    REASON FOR REFERRAL:  Management of LVAD    PLAN OF CARE:  · 69 y.o. male with severe ischemic cardiomyopathy, LVEF 15-20% s/p HM3 LVAD implant as DT on 22 by Dr. Cruz Mt c/b intraoperative bleeding requiring multiple blood products and subsequent RV failure with severe TR on high dose dobutamine IV and sternal wound infection on antibiotics  · Plan for remainder of hospitalization includes:  · Surveillance weekly echos to determine if RV function improves and TV coapts  · Diuresis and removal of chest tubes when ready  · PT/OT/Nutrition    PLAN:  POD 18  TTE  shows mod to severe TR but improved RV function; continue to optimize medically   Continue speed 4800rpms, low speed 4400  Continue dobutamine 6 mcg/kg/min-adjust to current weight- no weaning at this time due to TR  Continue hydralazine 50 mg PO q8h   Continue  amiodarone 200 mg PO twice daily ( last dose )  Continue losartan 25 mg PO daily   Cont Eplerenone 25mg daily  Continue Bumex 2mg IV BID  S/p Venofer 200mg x 2 doses   Completed course of cefepime for sternal incision   Monitor MAP via doppler  Driveline dressing changes daily for now until 5/3/22  Wound culture negative   NANDINI hose    Needs EKG weekly on   Continue warfarin, goal INR 2-3; 8mg tonight   ASA 81mg daily  Continue colchicine for pericarditis   Continue gabapentin 200 mg PO TID   IV fentanyl prn (pt with hx anaphylaxis to oxycodone)  Increase bowel regimen   Afrin nasal spray  Pulmonary toilet  Monitor CT output    CPAP QHS  Advanced care plan forms on file   Strict I/O, daily weights, Na+ restricted diet   Continue VAD education   Equipment ordered, will set goal for home next week     LAST 24 HOURS:  No acute events overnight  Incomplete I/O  VSS and LVAD flows stable- no acute alarms on VAD  Creatinine stable 0.8  T Bili 0.7   PBNP 3200  1L from CT on 4/23       REVIEW OF SYSTEMS:  Review of Systems   Constitutional: Negative for chills, fever, malaise/fatigue and weight loss. Epistaxis this morning    Respiratory: Negative for cough and shortness of breath. Cardiovascular: Negative for chest pain, palpitations, orthopnea and leg swelling. Gastrointestinal: Negative for abdominal pain, nausea and vomiting. Neurological: Negative for dizziness and weakness. Psychiatric/Behavioral: Negative. LIFE GOALS:  Patient's personal goals include: getting stronger and going home soon  Important upcoming milestones or family events: The patient identifies the following as important for living well: being independent, being at home, enjoying family time        OBJECTIVE:  PHYSICAL EXAM:  Visit Vitals  /88   Pulse 80   Temp 99.1 °F (37.3 °C)   Resp 18   Ht 6' 1\" (1.854 m)   Wt 183 lb 10.3 oz (83.3 kg)   SpO2 98%   BMI 24.23 kg/m²       LVAD INTERROGATION:  Device interrogated in person  Device function normal, normal flow, no events  LVAD   Pump Speed (RPM): 4800  Pump Flow (LPM): 3.1  MAP: 84  PI (Pulsitility Index): 7.1  Power: 3.1   Test: Yes  Back Up  at Bedside & Labeled: Yes  Power Module Test: Yes  Driveline Site Care: Yes  Driveline Dressing: Clean, Dry, and Intact  MAP in Therapeutic Range (Outpatient): Yes  Testing  Alarms Reviewed: Yes  Back up SC speed: 4800  Back up Low Speed Limit: 4400  Emergency Equipment with Patient?: Yes  Emergency procedures reviewed?: Yes  Drive line site inspected?: Yes  Drive line intergrity inspected?: Yes  Drive line dressing changed?: No      Physical Exam  Vitals reviewed. Constitutional:       General: He is not in acute distress. Appearance: Normal appearance.    Cardiovascular:      Rate and Rhythm: Normal rate and regular rhythm. Comments: LVAD Hum noted on auscultation  Pulmonary:      Effort: Pulmonary effort is normal. No respiratory distress. Abdominal:      General: Bowel sounds are normal. There is no distension. Palpations: Abdomen is soft. Tenderness: There is no abdominal tenderness. Musculoskeletal:      Right lower le+ Pitting Edema present. Left lower le+ Pitting Edema present. Comments: Improved BLE edema    Skin:     General: Skin is warm and dry. Capillary Refill: Capillary refill takes less than 2 seconds. Neurological:      General: No focal deficit present. Mental Status: He is alert and oriented to person, place, and time. Psychiatric:         Mood and Affect: Mood normal.         Behavior: Behavior normal.         Thought Content: Thought content normal.         Judgment: Judgment normal.            CARDIAC IMAGING AND DIAGNOSTICS REVIEWED:  Echo 22- LVEF 15-20%, severe TR, TAPSE 0.5cm, RVIDd 6cm   Echo (22)    Left Ventricle: Left ventricle size is normal. Moderately increased wall thickness. Findings consistent with concentric remodeling. Severe global hypokinesis present. Severely reduced left ventricular systolic function with a visually estimated EF of 10 -15%. 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   Aortic Valve: Moderate sclerosis of the aortic valve cusp.   Mitral Valve: Moderately thickened leaflet. Mild transvalvular regurgitation.   Tricuspid Valve: Severe transvalvular regurgitation.   Right Atrium: Right atrium is severely dilated.     Echo (3/2/22)   · LV 10-15%  · Mod-severe MR      Echo (21):  · LV 15-20%.    · Mod-severe, MR, mod-TR     Echo (21)  · LV: Estimated LVEF is 20 - 25%. Normal wall thickness. Mildly dilated left ventricle. Severely and globally reduced systolic function. Severe (grade 4) left ventricular diastolic dysfunction.   · LA: Severely dilated left atrium. · RA: Severely dilated right atrium. · MV: Moderate mitral valve regurgitation is present. · TV: Moderate tricuspid valve regurgitation is present. · AO: Mild aortic root dilatation. · RV: Pacer/ICD present. · LVED 6.2cm     EKG (5/20/21) SB with 1degree AV block, QRS 116ms     LHC (5/24/21) Native Coronaries: LM - moderate to severe distal disease 50%. % prox occluded (), heavily calcified, LCx: 80% proximal stenosis. OM1 is  (seen filling faintly via collaterals). OM2 99% stenosis. RCA: 100% proximal occluded.  LIMA to LAD: patent, supplies only a diagonal branch (probably D2). The LAD distal to the bifurcation is 100% occluded () and fills via right to left collaterals off the SVG to RCA. SVG to R-PDA: patent with moderate diffuse irregularities but no obstructive disease in the graft.    No appealing interventional targets.      NST as above     ICD Interrogation (11/12/2021) [employer] VVI, thoracic impedence trending up, no events, normal device function and good battery  ICD interrogation (5/12/21) [employer] single lead, no events, normal device function and good battery     HEMODYNAMICS:  RHC 5/24/21 CI 1.97, PA 33/9/17, RA 1, PCWP 6- off milrinone   CPEST not done     Patient underwent a 6 minute walk test 11/12/2021     6 Min Walk Report 11/12/2021 7/6/2021 5/20/2021   (PRE) HR 88 98 74   (PRE) O2 Sat 100 - 99   (POST)  - 81   (POST) O2 Sat 99 98% on Ra 99   Distance in Meters 386.58 - 1158. 24       OTHER IMAGING:  CXR (6/17/21) clear  CT 5/21/21 1. Irregular pulmonary nodule in the left upper lobe measuring 2 cm, suspicious for primary pulmonary malignancy. 2. Additional 6 mm left upper lobe pulmonary nodule. 3. Severe calcific atherosclerosis of the coronary arteries and abdominal aorta. No aneurysm. 4. Cardiomegaly. 5. No acute abnormality within the chest, abdomen, or pelvis.       LABORATORY RESULTS:     Labs Latest Ref Rng & Units 4/24/2022 4/23/2022 4/22/2022 4/21/2022 4/20/2022 4/19/2022 4/18/2022   WBC 4.1 - 11.1 K/uL 6.1 6.8 6.9 7.0 8.2 9.0 9.6   RBC 4.10 - 5.70 M/uL 2.82(L) 2.96(L) 2.89(L) 2.90(L) 3.02(L) 3.09(L) 3.10(L)   Hemoglobin 12.1 - 17.0 g/dL 7. 5(L) 7. 7(L) 7. 5(L) 7. 5(L) 7. 9(L) 8.0(L) 8. 1(L)   Hematocrit 36.6 - 50.3 % 23. 9(L) 24. 3(L) 24. 1(L) 23. 5(L) 24. 5(L) 25. 3(L) 25. 1(L)   MCV 80.0 - 99.0 FL 84.8 82.1 83.4 81.0 81.1 81.9 81.0   Platelets 829 - 736 K/uL 248 270 274 268 256 240 243   Lymphocytes 12 - 49 % - - - - - - -   Monocytes 5 - 13 % - - - - - - -   Eosinophils 0 - 7 % - - - - - - -   Basophils 0 - 1 % - - - - - - -   Bands 0 - 6 % - - - - - - -   Albumin 3.5 - 5.0 g/dL 2. 6(L) 2. 8(L) 2. 8(L) 2. 5(L) 2. 6(L) 2. 6(L) 2. 6(L)   Calcium 8.5 - 10.1 MG/DL 8. 0(L) 8.4(L) 8.4(L) 7. 9(L) 7. 8(L) 8.0(L) 8. 3(L)   Glucose 65 - 100 mg/dL 105(H) 87 114(H) 131(H) 123(H) 191(H) 153(H)   BUN 6 - 20 MG/DL 22(H) 23(H) 24(H) 18 16 18 26(H)   Creatinine 0.70 - 1.30 MG/DL 0.83 0.94 0.90 0.83 0.76 0.80 0.89   Sodium 136 - 145 mmol/L 134(L) 133(L) 132(L) 135(L) 133(L) 131(L) 127(L)   Potassium 3.5 - 5.1 mmol/L 4.3 4.0 4.2 3.6 3.6 4.4 5.0   TSH 0.36 - 3.74 uIU/mL - - - - - - -   LDH 85 - 241 U/L 218 199 216 168 196 206 192   Some recent data might be hidden     Lab Results   Component Value Date/Time    TSH 2.26 04/10/2022 03:00 AM    TSH 1.68 03/01/2022 11:50 AM    TSH 1.47 05/26/2021 10:44 PM    TSH 1.97 05/20/2021 04:28 PM    TSH 1.41 02/09/2021 03:05 PM    TSH 1.740 08/14/2018 09:58 AM    TSH 1.710 10/18/2017 12:00 AM         HISTORY:  PAST MEDICAL HISTORY:  Past Medical History:   Diagnosis Date    CAD (coronary artery disease) '90 '97, '13 x 2    [place] [facility] [place] Calculus of kidney     Colonic polyps     Congestive heart failure, unspecified     Diabetes ([facility] [address]     Gastritis     Hypercholesterolemia     Sleep apnea        PAST SURGICAL HISTORY:  Past Surgical History:   Procedure Laterality Date    COLONOSCOPY  04/06/2011    16, due 21  COLONOSCOPY N/A 3/2/2022    COLONOSCOPY    :- performed by [facility] at [facility] ENDOSCOPY    ENDOSCOPY, COLON, DIAGNOSTIC      11, due 16    HX CORONARY ARTERY BYPASS GRAFT  8/22/12    x 4 vessel by MISSY LION    HX PACEMAKER PLACEMENT  1/30/13    [place] CARDIAC SURG PROCEDURE UNLIST  2012    x 4 vessels       FAMILY HISTORY:  Family History   Problem Relation Age of Onset    Heart Disease Mother         MI    Heart Disease Father         CAD & PVD    Lung Disease Father     Cancer Father         lung    Diabetes Maternal Grandmother     Heart Disease Other         CAD    Stroke Sister        SOCIAL HISTORY:  Social History     Socioeconomic History    Marital status:    Tobacco Use    Smoking status: Never Smoker    Smokeless tobacco: Never Used   Vaping Use    Vaping Use: Never used   Substance and Sexual Activity    Alcohol use:  Yes     Alcohol/week: 0.0 standard drinks     Comment:  VERY RARE    Drug use: No       ALLERGY:  Allergies   Allergen Reactions    Oxycodone Anaphylaxis    Pcn [Penicillins] Hives        CURRENT MEDICATIONS:    Current Facility-Administered Medications:     eplerenone (INSPRA) tablet 25 mg, 25 mg, Oral, DAILY, Terry, Danica B, NP, 25 mg at 04/23/22 0828    DOBUTamine (DOBUTREX) 1,000 mg/250 mL (4,000 mcg/mL) infusion, 6 mcg/kg/min, IntraVENous, CONTINUOUS, Terry, Danica B, NP, Last Rate: 7.8 mL/hr at 04/22/22 1000, 6 mcg/kg/min at 04/22/22 1000    potassium chloride SR (KLOR-CON 10) tablet 20 mEq, 20 mEq, Oral, DAILY, Terry, Danica B, NP, 20 mEq at 04/23/22 0828    guaiFENesin (ROBITUSSIN) 100 mg/5 mL oral liquid 100 mg, 100 mg, Oral, Q4H PRN, Avis Madrid MD, 100 mg at 04/22/22 2314    losartan (COZAAR) tablet 25 mg, 25 mg, Oral, DAILY, Terry, Danica B, NP, 25 mg at 04/23/22 0828    bumetanide (BUMEX) injection 2 mg, 2 mg, IntraVENous, BID, Terry, Danica B, NP, 2 mg at 04/23/22 1915    polyethylene glycol (MIRALAX) packet 17 g, 17 g, Oral, DAILY, Charisse Ba B, NP, 17 g at 04/23/22 0829    senna-docusate (PERICOLACE) 8.6-50 mg per tablet 1 Tablet, 1 Tablet, Oral, DAILY PRN, Marguerite Huang, NP    oxymetazoline (AFRIN) 0.05 % nasal spray 2 Spray, 2 Spray, Both Nostrils, BID PRN, Charisse Ba B, NP    hydrALAZINE (APRESOLINE) tablet 50 mg, 50 mg, Oral, Q8H, Millie Wong T, NP, 50 mg at 04/24/22 0405    amiodarone (CORDARONE) tablet 200 mg, 200 mg, Oral, BID, Polliard, Bravo Daily T, NP, 200 mg at 04/23/22 1916    colchicine tablet 0.6 mg, 0.6 mg, Oral, DAILY, Polliard, Bravo Daily T, NP, 0.6 mg at 04/23/22 8560    alteplase (CATHFLO) 1 mg in sterile water (preservative free) 1 mL injection, 1 mg, InterCATHeter, PRN, Jose Palmer MD, 1 mg at 04/22/22 2310    bisacodyL (DULCOLAX) suppository 10 mg, 10 mg, Rectal, DAILY PRN, Judy García MD, 10 mg at 04/12/22 1533    gabapentin (NEURONTIN) capsule 200 mg, 200 mg, Oral, TID, Polliard, Bravo Daily T, NP, 200 mg at 04/23/22 2134    albumin human 25% (BUMINATE) solution 12.5 g, 12.5 g, IntraVENous, BID, Polliard, Bravo Daily T, NP, 12.5 g at 04/23/22 1916    famotidine (PEPCID) tablet 20 mg, 20 mg, Oral, Q12H, Judy García MD, 20 mg at 04/23/22 2134    mirtazapine (REMERON) tablet 7.5 mg, 7.5 mg, Oral, QHS, Terry Danica B, NP, 7.5 mg at 04/23/22 2134    lidocaine 4 % patch 1 Patch, 1 Patch, TransDERmal, Q24H, Danica Stewart NP, 1 Patch at 04/23/22 1213    Warfarin NP/MD dosing, , Other, PRN, Danica Stewart NP    melatonin tablet 3 mg, 3 mg, Oral, QHS PRN, Shwetha Cruz, Khushbu Patricia MD, 3 mg at 04/20/22 2127    hydrALAZINE (APRESOLINE) 20 mg/mL injection 10 mg, 10 mg, IntraVENous, Q6H PRN, Charisse JACOBSON NP, 10 mg at 04/18/22 1809    aspirin chewable tablet 81 mg, 81 mg, Oral, DAILY, Charisse JACOBSON NP, 81 mg at 04/23/22 7942    hydrALAZINE (APRESOLINE) 20 mg/mL injection 5 mg, 5 mg, IntraVENous, Q6H PRN, Marguerite Huang NP, 5 mg at 04/13/22 0153    sodium chloride (NS) flush 5-40 mL, 5-40 mL, IntraVENous, Q8H, Millie Wong NP, 10 mL at 04/24/22 0704    sodium chloride (NS) flush 5-40 mL, 5-40 mL, IntraVENous, PRN, Bhavin Wong, NP    0.9% sodium chloride infusion, 9 mL/hr, IntraVENous, CONTINUOUS, Sofiya Wong NP, Last Rate: 3 mL/hr at 04/20/22 0513, 3 mL/hr at 04/20/22 0513    acetaminophen (TYLENOL) tablet 650 mg, 650 mg, Oral, Q6H PRN, Bhavin Wong NP, 650 mg at 04/21/22 1306    naloxone (NARCAN) injection 0.4 mg, 0.4 mg, IntraVENous, PRN, Bhavin Wong, NP    ondansetron (ZOFRAN) injection 4 mg, 4 mg, IntraVENous, Q4H PRN, Sofiya Wong, NP, 4 mg at 04/11/22 2139    albuterol-ipratropium (DUO-NEB) 2.5 MG-0.5 MG/3 ML, 3 mL, Nebulization, Q6H PRN, Bhavin Wong NP    glucose chewable tablet 16 g, 4 Tablet, Oral, PRN, Bhavin Wong, NP    glucagon (GLUCAGEN) injection 1 mg, 1 mg, IntraMUSCular, PRN, Bhavin Wong NP    insulin lispro (HUMALOG) injection, , SubCUTAneous, AC&HS, Bhavin Wong NP, 1 Units at 04/22/22 2218    sucralfate (CARAFATE) tablet 1 g, 1 g, Oral, AC&HS, Bhavin Wong, NP, 1 g at 04/24/22 0708    0.45% sodium chloride infusion, 10 mL/hr, IntraVENous, CONTINUOUS, Nayan Najera MD, Stopped at 04/10/22 0730    fentaNYL citrate (PF) injection 25 mcg, 25 mcg, IntraVENous, Q2H PRN, Bhavin Wong NP, 25 mcg at 04/23/22 0403    PATIENT CARE TEAM:  Patient Care Team:  Laurence Obrien MD as PCP - General (Internal Medicine)  Laurence Obrien MD as PCP - [facility]  Joss Plunkett MD as Physician (Cardiology)  Jt Allen MD as Physician (Gastroenterology)  Nabor Mujica MD as Physician (Orthopedic Surgery)  Amor Moreno MD as Physician (Ophthalmology)  Marcela Van MD (Cardiology)  Gerardo Cazares MD (Cardiology)       Thank you for allowing us to participate in this patient's care.       Larissa Julien Braxton Chowdhury NP   [address] [place] [address], [address]  Phone: [phone]

## 2022-04-24 NOTE — PROGRESS NOTES
Wife Marck Godoy performed sterile dressing change today as well as yesterday. Still requiring occasional cues during setup. Patient has excellent family support for when medically ready for home.

## 2022-04-24 NOTE — PROGRESS NOTES
Chest drain output charted from yesterday now complete and accurate. Approx 1L output last 24hrs.  Discussed with NP    +1BM today

## 2022-04-25 ENCOUNTER — APPOINTMENT (OUTPATIENT)
Dept: GENERAL RADIOLOGY | Age: 77
DRG: 001 | End: 2022-04-25
Attending: NURSE PRACTITIONER
Payer: MEDICARE

## 2022-04-25 LAB
ALBUMIN SERPL-MCNC: 2.7 G/DL (ref 3.5–5)
ALBUMIN SERPL-MCNC: 3 G/DL (ref 3.5–5)
ALBUMIN/GLOB SERPL: 0.9 {RATIO} (ref 1.1–2.2)
ALBUMIN/GLOB SERPL: 1 {RATIO} (ref 1.1–2.2)
ALP SERPL-CCNC: 136 U/L (ref 45–117)
ALP SERPL-CCNC: 152 U/L (ref 45–117)
ALT SERPL-CCNC: 24 U/L (ref 12–78)
ALT SERPL-CCNC: 29 U/L (ref 12–78)
ANION GAP SERPL CALC-SCNC: 5 MMOL/L (ref 5–15)
ANION GAP SERPL CALC-SCNC: 6 MMOL/L (ref 5–15)
AST SERPL-CCNC: 67 U/L (ref 15–37)
AST SERPL-CCNC: 84 U/L (ref 15–37)
BILIRUB SERPL-MCNC: 0.8 MG/DL (ref 0.2–1)
BILIRUB SERPL-MCNC: 0.8 MG/DL (ref 0.2–1)
BNP SERPL-MCNC: 3181 PG/ML
BUN SERPL-MCNC: 19 MG/DL (ref 6–20)
BUN SERPL-MCNC: 21 MG/DL (ref 6–20)
BUN/CREAT SERPL: 23 (ref 12–20)
BUN/CREAT SERPL: 23 (ref 12–20)
CALCIUM SERPL-MCNC: 7.6 MG/DL (ref 8.5–10.1)
CALCIUM SERPL-MCNC: 8 MG/DL (ref 8.5–10.1)
CHLORIDE SERPL-SCNC: 98 MMOL/L (ref 97–108)
CHLORIDE SERPL-SCNC: 98 MMOL/L (ref 97–108)
CO2 SERPL-SCNC: 29 MMOL/L (ref 21–32)
CO2 SERPL-SCNC: 29 MMOL/L (ref 21–32)
CREAT SERPL-MCNC: 0.84 MG/DL (ref 0.7–1.3)
CREAT SERPL-MCNC: 0.9 MG/DL (ref 0.7–1.3)
ERYTHROCYTE [DISTWIDTH] IN BLOOD BY AUTOMATED COUNT: 20 % (ref 11.5–14.5)
ERYTHROCYTE [DISTWIDTH] IN BLOOD BY AUTOMATED COUNT: 20.6 % (ref 11.5–14.5)
ERYTHROCYTE [DISTWIDTH] IN BLOOD BY AUTOMATED COUNT: 20.8 % (ref 11.5–14.5)
GLOBULIN SER CALC-MCNC: 2.7 G/DL (ref 2–4)
GLOBULIN SER CALC-MCNC: 3.5 G/DL (ref 2–4)
GLUCOSE BLD STRIP.AUTO-MCNC: 111 MG/DL (ref 65–117)
GLUCOSE BLD STRIP.AUTO-MCNC: 125 MG/DL (ref 65–117)
GLUCOSE BLD STRIP.AUTO-MCNC: 131 MG/DL (ref 65–117)
GLUCOSE BLD STRIP.AUTO-MCNC: 199 MG/DL (ref 65–117)
GLUCOSE SERPL-MCNC: 103 MG/DL (ref 65–100)
GLUCOSE SERPL-MCNC: 157 MG/DL (ref 65–100)
HCT VFR BLD AUTO: 22 % (ref 36.6–50.3)
HCT VFR BLD AUTO: 24.5 % (ref 36.6–50.3)
HCT VFR BLD AUTO: 27.6 % (ref 36.6–50.3)
HGB BLD-MCNC: 6.8 G/DL (ref 12.1–17)
HGB BLD-MCNC: 7.6 G/DL (ref 12.1–17)
HGB BLD-MCNC: 8.6 G/DL (ref 12.1–17)
INR PPP: 2.6 (ref 0.9–1.1)
LACTATE SERPL-SCNC: 1.6 MMOL/L (ref 0.4–2)
LDH SERPL L TO P-CCNC: 169 U/L (ref 85–241)
MAGNESIUM SERPL-MCNC: 2.1 MG/DL (ref 1.6–2.4)
MCH RBC QN AUTO: 25.7 PG (ref 26–34)
MCH RBC QN AUTO: 26.1 PG (ref 26–34)
MCH RBC QN AUTO: 26.1 PG (ref 26–34)
MCHC RBC AUTO-ENTMCNC: 30.9 G/DL (ref 30–36.5)
MCHC RBC AUTO-ENTMCNC: 31 G/DL (ref 30–36.5)
MCHC RBC AUTO-ENTMCNC: 31.2 G/DL (ref 30–36.5)
MCV RBC AUTO: 82.8 FL (ref 80–99)
MCV RBC AUTO: 83.9 FL (ref 80–99)
MCV RBC AUTO: 84.3 FL (ref 80–99)
NRBC # BLD: 0 K/UL (ref 0–0.01)
NRBC BLD-RTO: 0 PER 100 WBC
PLATELET # BLD AUTO: 222 K/UL (ref 150–400)
PLATELET # BLD AUTO: 249 K/UL (ref 150–400)
PLATELET # BLD AUTO: 269 K/UL (ref 150–400)
PMV BLD AUTO: 9 FL (ref 8.9–12.9)
PMV BLD AUTO: 9.1 FL (ref 8.9–12.9)
PMV BLD AUTO: 9.3 FL (ref 8.9–12.9)
POTASSIUM SERPL-SCNC: 3.8 MMOL/L (ref 3.5–5.1)
POTASSIUM SERPL-SCNC: 4.1 MMOL/L (ref 3.5–5.1)
PROT SERPL-MCNC: 5.4 G/DL (ref 6.4–8.2)
PROT SERPL-MCNC: 6.5 G/DL (ref 6.4–8.2)
PROTHROMBIN TIME: 25.7 SEC (ref 9–11.1)
RBC # BLD AUTO: 2.61 M/UL (ref 4.1–5.7)
RBC # BLD AUTO: 2.96 M/UL (ref 4.1–5.7)
RBC # BLD AUTO: 3.29 M/UL (ref 4.1–5.7)
SERVICE CMNT-IMP: ABNORMAL
SERVICE CMNT-IMP: NORMAL
SODIUM SERPL-SCNC: 132 MMOL/L (ref 136–145)
SODIUM SERPL-SCNC: 133 MMOL/L (ref 136–145)
WBC # BLD AUTO: 5 K/UL (ref 4.1–11.1)
WBC # BLD AUTO: 5.4 K/UL (ref 4.1–11.1)
WBC # BLD AUTO: 6.5 K/UL (ref 4.1–11.1)

## 2022-04-25 PROCEDURE — 85610 PROTHROMBIN TIME: CPT

## 2022-04-25 PROCEDURE — 93750 INTERROGATION VAD IN PERSON: CPT | Performed by: INTERNAL MEDICINE

## 2022-04-25 PROCEDURE — 83605 ASSAY OF LACTIC ACID: CPT

## 2022-04-25 PROCEDURE — 80053 COMPREHEN METABOLIC PANEL: CPT

## 2022-04-25 PROCEDURE — 71045 X-RAY EXAM CHEST 1 VIEW: CPT

## 2022-04-25 PROCEDURE — 74018 RADEX ABDOMEN 1 VIEW: CPT

## 2022-04-25 PROCEDURE — 74011250636 HC RX REV CODE- 250/636: Performed by: THORACIC SURGERY (CARDIOTHORACIC VASCULAR SURGERY)

## 2022-04-25 PROCEDURE — 74011250637 HC RX REV CODE- 250/637: Performed by: NURSE PRACTITIONER

## 2022-04-25 PROCEDURE — 77030037442 HC TY LVAD MGMT SYST CMP-B

## 2022-04-25 PROCEDURE — 83735 ASSAY OF MAGNESIUM: CPT

## 2022-04-25 PROCEDURE — 65660000001 HC RM ICU INTERMED STEPDOWN

## 2022-04-25 PROCEDURE — 74011000250 HC RX REV CODE- 250: Performed by: NURSE PRACTITIONER

## 2022-04-25 PROCEDURE — 74011250637 HC RX REV CODE- 250/637: Performed by: INTERNAL MEDICINE

## 2022-04-25 PROCEDURE — 83880 ASSAY OF NATRIURETIC PEPTIDE: CPT

## 2022-04-25 PROCEDURE — 74011000250 HC RX REV CODE- 250: Performed by: THORACIC SURGERY (CARDIOTHORACIC VASCULAR SURGERY)

## 2022-04-25 PROCEDURE — 97535 SELF CARE MNGMENT TRAINING: CPT

## 2022-04-25 PROCEDURE — 85027 COMPLETE CBC AUTOMATED: CPT

## 2022-04-25 PROCEDURE — 83615 LACTATE (LD) (LDH) ENZYME: CPT

## 2022-04-25 PROCEDURE — P9047 ALBUMIN (HUMAN), 25%, 50ML: HCPCS | Performed by: NURSE PRACTITIONER

## 2022-04-25 PROCEDURE — 82962 GLUCOSE BLOOD TEST: CPT

## 2022-04-25 PROCEDURE — 74011250636 HC RX REV CODE- 250/636: Performed by: NURSE PRACTITIONER

## 2022-04-25 PROCEDURE — 36415 COLL VENOUS BLD VENIPUNCTURE: CPT

## 2022-04-25 PROCEDURE — 99233 SBSQ HOSP IP/OBS HIGH 50: CPT | Performed by: INTERNAL MEDICINE

## 2022-04-25 PROCEDURE — 2709999900 HC NON-CHARGEABLE SUPPLY

## 2022-04-25 RX ORDER — KETOROLAC TROMETHAMINE 30 MG/ML
15 INJECTION, SOLUTION INTRAMUSCULAR; INTRAVENOUS
Status: DISPENSED | OUTPATIENT
Start: 2022-04-25 | End: 2022-04-30

## 2022-04-25 RX ORDER — DOBUTAMINE HYDROCHLORIDE 400 MG/100ML
2.5 INJECTION INTRAVENOUS CONTINUOUS
Status: DISCONTINUED | OUTPATIENT
Start: 2022-04-25 | End: 2022-05-04

## 2022-04-25 RX ADMIN — MIRTAZAPINE 7.5 MG: 15 TABLET, FILM COATED ORAL at 21:32

## 2022-04-25 RX ADMIN — WATER 1 MG: 1 INJECTION INTRAMUSCULAR; INTRAVENOUS; SUBCUTANEOUS at 21:36

## 2022-04-25 RX ADMIN — DOBUTAMINE HYDROCHLORIDE 6 MCG/KG/MIN: 400 INJECTION INTRAVENOUS at 12:42

## 2022-04-25 RX ADMIN — LOSARTAN POTASSIUM 25 MG: 25 TABLET, FILM COATED ORAL at 10:36

## 2022-04-25 RX ADMIN — SUCRALFATE 1 G: 1 TABLET ORAL at 17:52

## 2022-04-25 RX ADMIN — SODIUM CHLORIDE, PRESERVATIVE FREE 10 ML: 5 INJECTION INTRAVENOUS at 18:03

## 2022-04-25 RX ADMIN — EPLERENONE 25 MG: 25 TABLET, FILM COATED ORAL at 10:36

## 2022-04-25 RX ADMIN — ALBUMIN (HUMAN) 12.5 G: 0.25 INJECTION, SOLUTION INTRAVENOUS at 17:58

## 2022-04-25 RX ADMIN — GABAPENTIN 200 MG: 100 CAPSULE ORAL at 21:33

## 2022-04-25 RX ADMIN — COLCHICINE 0.6 MG: 0.6 TABLET, FILM COATED ORAL at 10:36

## 2022-04-25 RX ADMIN — GABAPENTIN 200 MG: 100 CAPSULE ORAL at 10:36

## 2022-04-25 RX ADMIN — SUCRALFATE 1 G: 1 TABLET ORAL at 07:21

## 2022-04-25 RX ADMIN — ACETAMINOPHEN 650 MG: 325 TABLET ORAL at 10:47

## 2022-04-25 RX ADMIN — WARFARIN SODIUM 6 MG: 5 TABLET ORAL at 17:54

## 2022-04-25 RX ADMIN — POLYETHYLENE GLYCOL 3350 17 G: 17 POWDER, FOR SOLUTION ORAL at 10:36

## 2022-04-25 RX ADMIN — POTASSIUM CHLORIDE 20 MEQ: 750 TABLET, EXTENDED RELEASE ORAL at 10:36

## 2022-04-25 RX ADMIN — BUMETANIDE 2 MG: 0.25 INJECTION, SOLUTION INTRAMUSCULAR; INTRAVENOUS at 10:37

## 2022-04-25 RX ADMIN — SODIUM CHLORIDE, PRESERVATIVE FREE 10 ML: 5 INJECTION INTRAVENOUS at 21:36

## 2022-04-25 RX ADMIN — AMIODARONE HYDROCHLORIDE 200 MG: 200 TABLET ORAL at 17:52

## 2022-04-25 RX ADMIN — DOCUSATE SODIUM 100 MG: 100 CAPSULE, LIQUID FILLED ORAL at 10:36

## 2022-04-25 RX ADMIN — SUCRALFATE 1 G: 1 TABLET ORAL at 12:44

## 2022-04-25 RX ADMIN — AMIODARONE HYDROCHLORIDE 200 MG: 200 TABLET ORAL at 10:36

## 2022-04-25 RX ADMIN — SUCRALFATE 1 G: 1 TABLET ORAL at 21:33

## 2022-04-25 RX ADMIN — SODIUM CHLORIDE, PRESERVATIVE FREE 10 ML: 5 INJECTION INTRAVENOUS at 07:22

## 2022-04-25 RX ADMIN — HYDRALAZINE HYDROCHLORIDE 50 MG: 50 TABLET, FILM COATED ORAL at 12:44

## 2022-04-25 RX ADMIN — ALBUMIN (HUMAN) 12.5 G: 0.25 INJECTION, SOLUTION INTRAVENOUS at 10:35

## 2022-04-25 RX ADMIN — BUMETANIDE 2 MG: 0.25 INJECTION, SOLUTION INTRAMUSCULAR; INTRAVENOUS at 17:52

## 2022-04-25 RX ADMIN — HYDRALAZINE HYDROCHLORIDE 50 MG: 50 TABLET, FILM COATED ORAL at 21:33

## 2022-04-25 RX ADMIN — FENTANYL CITRATE 25 MCG: 0.05 INJECTION, SOLUTION INTRAMUSCULAR; INTRAVENOUS at 12:51

## 2022-04-25 RX ADMIN — GABAPENTIN 200 MG: 100 CAPSULE ORAL at 17:51

## 2022-04-25 RX ADMIN — HYDRALAZINE HYDROCHLORIDE 50 MG: 50 TABLET, FILM COATED ORAL at 03:13

## 2022-04-25 RX ADMIN — FAMOTIDINE 20 MG: 20 TABLET ORAL at 10:36

## 2022-04-25 RX ADMIN — KETOROLAC TROMETHAMINE 15 MG: 30 INJECTION, SOLUTION INTRAMUSCULAR; INTRAVENOUS at 14:42

## 2022-04-25 RX ADMIN — ASPIRIN 81 MG CHEWABLE TABLET 81 MG: 81 TABLET CHEWABLE at 10:36

## 2022-04-25 RX ADMIN — FAMOTIDINE 20 MG: 20 TABLET ORAL at 21:33

## 2022-04-25 NOTE — WOUND CARE
fololwup visit for gluteal cleft MASD. Previously seen by JOI Garcia. Split in gluteal cleft is now 100% resurfaced with light pink skin. Barrier cream applied for protection and soothing. He is able to turn independently in bed for assessment and reports no pain in area. Will sign off.    Lety Duenas, ERNESTON

## 2022-04-25 NOTE — PROGRESS NOTES
Bedside shift change report given to Dionne Lora (oncoming nurse) by Gianna Zeng (offgoing nurse). Report included the following information SBAR, Kardex, ED Summary, [facility] and Cardiac Rhythm SR. Problem: Falls - Risk of  Goal: *Absence of Falls  Description: Document Piotr Emerybride Fall Risk and appropriate interventions in the flowsheet. Outcome: Progressing Towards Goal  Note: Fall Risk Interventions:  Mobility Interventions: Communicate number of staff needed for ambulation/transfer,Patient to call before getting OOB,Bed/chair exit alarm      Medication Interventions: Patient to call before getting OOB,Teach patient to arise slowly,Bed/chair exit alarm    Elimination Interventions: Call light in reach,Patient to call for help with toileting needs,Toileting schedule/hourly rounds,Bed/chair exit alarm      Problem: Diabetes Self-Management  Goal: *Disease process and treatment process  Description: Define diabetes and identify own type of diabetes; list 3 options for treating diabetes. Outcome: Progressing Towards Goal  Goal: *Using medications safely  Description: State effect of diabetes medications on diabetes; name diabetes medication taking, action and side effects. Outcome: Progressing Towards Goal  Goal: *Monitoring blood glucose, interpreting and using results  Description: Identify recommended blood glucose targets  and personal targets. Outcome: Progressing Towards Goal     Problem: Pressure Injury - Risk of  Goal: *Prevention of pressure injury  Description: Document Jl Scale and appropriate interventions in the flowsheet.   Outcome: Progressing Towards Goal  Note: Pressure Injury Interventions:  Sensory Interventions: Avoid rigorous massage over bony prominences,Keep linens dry and wrinkle-free,Maintain/enhance activity level,Minimize linen layers    Moisture Interventions: Absorbent underpads,Apply protective barrier, creams and emollients,Minimize layers    Activity Interventions: Increase time out of bed    Mobility Interventions: HOB 30 degrees or less,Chair cushion    Nutrition Interventions: Offer support with meals,snacks and hydration,Document food/fluid/supplement intake    Friction and Shear Interventions: HOB 30 degrees or less,Apply protective barrier, creams and emollients,Minimize layers     Problem: Heart Failure: Discharge Outcomes  Goal: *Verbalizes understanding/describes prescribed medications  Outcome: Progressing Towards Goal     Problem: LVAD Post-op Day 15 to Discharge  Goal: Activity/Safety  Outcome: Progressing Towards Goal  Goal: Nutrition/Diet  Outcome: Progressing Towards Goal  Goal: Medications  Outcome: Progressing Towards Goal     Problem: LVAD: Discharge Outcomes  Goal: *Hemodynamically stable  Outcome: Progressing Towards Goal  Goal: *Optimal pain control at patient's stated goal  Outcome: Progressing Towards Goal  Goal: *Tolerating diet  Outcome: Progressing Towards Goal  Goal: *Patient/caregiver is able to perform drive line dressing change independently  Outcome: Progressing Towards Goal

## 2022-04-25 NOTE — PROGRESS NOTES
[facility] [place], [facility]     Attempted to meet with patient to review LVAD education. Patient c/o of upper abdominal pain and states he is unable to meet today. Anette Velásquez NP made aware. Will check in with patient tomorrow.         Lieutenant Robert RN  VAD Coordinator

## 2022-04-25 NOTE — PROGRESS NOTES
[address] [place] [address]  Inpatient Progress Note    Patient name: Aimee Mackenzie  Patient : 1945  Patient MRN: [id number]  Consulting MD: Cathy Hernandez MD  Date of service: 22    REASON FOR REFERRAL:  Management of LVAD    PLAN OF CARE:  69 y.o. male with severe ischemic cardiomyopathy, LVEF 15-20% s/p HM3 LVAD implant as DT on 22 by Dr. Raphael Lam c/b intraoperative bleeding requiring multiple blood products and subsequent RV failure with severe TR on high dose dobutamine IV and sternal wound infection on antibiotics  Plan for remainder of hospitalization includes:  Surveillance weekly echos to determine if RV function improves and TV coapts  Diuresis and removal of chest tubes when ready  PT/OT/Nutrition  Optimization of GDMT     PLAN:  POD 19  TTE  shows mod to severe TR but improved RV function; continue to optimize medically   Continue speed 4800rpms, low speed 4400  Continue dobutamine 6 mcg/kg/min - will need to D/C home as bridge to RV recovery   Continue hydralazine 50 mg PO q8h   Continue  amiodarone 200 mg PO twice daily (last dose )  Continue losartan 25 mg PO daily - increase today if orthostatics are negative   Cont Eplerenone 25mg daily  Continue Bumex 2mg IV BID  S/p Venofer 200mg x 2 doses   Completed course of cefepime for sternal incision   Monitor MAP via doppler  Driveline dressing changes daily for now until 5/3/22  Wound culture negative   NANDINI hose    Needs EKG weekly on   Continue warfarin, goal INR 2-3; 6mg tonight   ASA 81mg daily  Continue colchicine for pericarditis   Continue gabapentin 200 mg PO TID   IV fentanyl prn (pt with hx anaphylaxis to oxycodone)  Consider addition of PRN toradol today   Increased bowel regimen   Afrin nasal spray PRN   Pulmonary toilet  Monitor CT output; to remain in place today per Dr. Raphael Lam - on water seal   CPAP QHS  Advanced care plan forms on file   Strict I/O, daily weights, Na+ restricted diet   Continue VAD education   Equipment ordered, will set goal for home this week     LAST 24 HOURS:  No acute events overnight  Incomplete I/O  VSS and LVAD flows stable- no acute alarms on VAD  Creatinine stable 0.8  T Bili 0.7   PBNP 3200  1L from CT on 4/23       REVIEW OF SYSTEMS:  Review of Systems   Constitutional: Negative for chills, fever, malaise/fatigue and weight loss. Respiratory: Negative for cough and shortness of breath. Cardiovascular: Negative for chest pain, palpitations, orthopnea and leg swelling. Gastrointestinal: Negative for abdominal pain, nausea and vomiting. Musculoskeletal:        Post-op pain    Neurological: Negative for dizziness and weakness. Psychiatric/Behavioral: Negative. LIFE GOALS:  Patient's personal goals include: getting stronger and going home soon  Important upcoming milestones or family events: The patient identifies the following as important for living well: being independent, being at home, enjoying family time        OBJECTIVE:  PHYSICAL EXAM:  Visit Vitals  /88   Pulse 79   Temp 98.1 °F (36.7 °C)   Resp 15   Ht 6' 1\" (1.854 m)   Wt 182 lb 8.7 oz (82.8 kg)   SpO2 95%   BMI 24.08 kg/m²       LVAD INTERROGATION:  Device interrogated in person  Device function normal, normal flow, no events  LVAD   Pump Speed (RPM): 4800  Pump Flow (LPM): 3.5  MAP: 80  PI (Pulsitility Index): 6.7  Power: 3.2   Test: No  Back Up  at Bedside & Labeled: Yes  Power Module Test: No  Driveline Site Care: No  Driveline Dressing: Clean, Dry, and Intact  MAP in Therapeutic Range (Outpatient): Yes  Testing  Alarms Reviewed: Yes  Back up SC speed: 4800  Back up Low Speed Limit: 4400  Emergency Equipment with Patient?: Yes  Emergency procedures reviewed?: Yes  Drive line site inspected?: Yes  Drive line intergrity inspected?: Yes  Drive line dressing changed?: No      Physical Exam  Vitals reviewed. Constitutional:       General: He is not in acute distress. Appearance: Normal appearance. Cardiovascular:      Rate and Rhythm: Normal rate and regular rhythm. Comments: LVAD Hum noted on auscultation  Pulmonary:      Effort: Pulmonary effort is normal. No respiratory distress. Abdominal:      General: Bowel sounds are normal. There is no distension. Palpations: Abdomen is soft. Tenderness: There is no abdominal tenderness. Musculoskeletal:      Right lower leg: No edema. Left lower leg: No edema. Skin:     General: Skin is warm and dry. Capillary Refill: Capillary refill takes less than 2 seconds. Neurological:      General: No focal deficit present. Mental Status: He is alert and oriented to person, place, and time. Psychiatric:         Mood and Affect: Mood normal.         Behavior: Behavior normal.         Thought Content: Thought content normal.         Judgment: Judgment normal.            CARDIAC IMAGING AND DIAGNOSTICS REVIEWED:  Echo 4/18/22- LVEF 15-20%, severe TR, TAPSE 0.5cm, RVIDd 6cm   Echo (4/8/22)    Left Ventricle: Left ventricle size is normal. Moderately increased wall thickness. Findings consistent with concentric remodeling. Severe global hypokinesis present. Severely reduced left ventricular systolic function with a visually estimated EF of 10 -15%. 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 Aortic Valve: Moderate sclerosis of the aortic valve cusp. Mitral Valve: Moderately thickened leaflet. Mild transvalvular regurgitation. Tricuspid Valve: Severe transvalvular regurgitation. Right Atrium: Right atrium is severely dilated. Echo (3/2/22)   LV 10-15%  Mod-severe MR      Echo (11/23/21):  LV 15-20%. Mod-severe, MR, mod-TR     Echo (5/20/21)  LV: Estimated LVEF is 20 - 25%. Normal wall thickness. Mildly dilated left ventricle. Severely and globally reduced systolic function. Severe (grade 4) left ventricular diastolic dysfunction. LA: Severely dilated left atrium. RA: Severely dilated right atrium. MV: Moderate mitral valve regurgitation is present. TV: Moderate tricuspid valve regurgitation is present. AO: Mild aortic root dilatation. RV: Pacer/ICD present. LVED 6.2cm     EKG (5/20/21) SB with 1degree AV block, QRS 116ms     LHC (5/24/21) Native Coronaries: LM - moderate to severe distal disease 50%. % prox occluded (), heavily calcified, LCx: 80% proximal stenosis. OM1 is  (seen filling faintly via collaterals). OM2 99% stenosis. RCA: 100% proximal occluded. LIMA to LAD: patent, supplies only a diagonal branch (probably D2). The LAD distal to the bifurcation is 100% occluded () and fills via right to left collaterals off the SVG to RCA. SVG to R-PDA: patent with moderate diffuse irregularities but no obstructive disease in the graft. No appealing interventional targets. NST as above     ICD Interrogation (11/12/2021) [employer] VVI, thoracic impedence trending up, no events, normal device function and good battery  ICD interrogation (5/12/21) [employer] single lead, no events, normal device function and good battery     HEMODYNAMICS:  RHC 5/24/21 CI 1.97, PA 33/9/17, RA 1, PCWP 6- off milrinone   CPEST not done     Patient underwent a 6 minute walk test 11/12/2021     6 Min Walk Report 11/12/2021 7/6/2021 5/20/2021   (PRE) HR 88 98 74   (PRE) O2 Sat 100 - 99   (POST)  - 81   (POST) O2 Sat 99 98% on Ra 99   Distance in Meters 386.58 - 1158.24       OTHER IMAGING:  CXR (6/17/21) clear  CT 5/21/21 1. Irregular pulmonary nodule in the left upper lobe measuring 2 cm, suspicious for primary pulmonary malignancy. 2. Additional 6 mm left upper lobe pulmonary nodule. 3. Severe calcific atherosclerosis of the coronary arteries and abdominal aorta. No aneurysm. 4. Cardiomegaly.  5. No acute abnormality within the chest, abdomen, or pelvis. LABORATORY RESULTS:     Labs Latest Ref Rng & Units 4/25/2022 4/25/2022 4/24/2022 4/23/2022 4/22/2022 4/21/2022 4/20/2022   WBC 4.1 - 11.1 K/uL 5.4 5.0 6.1 6.8 6.9 7.0 8.2   RBC 4.10 - 5.70 M/uL 2.96(L) 2.61(L) 2.82(L) 2.96(L) 2.89(L) 2.90(L) 3.02(L)   Hemoglobin 12.1 - 17.0 g/dL 7. 6(L) 6. 8(L) 7. 5(L) 7. 7(L) 7. 5(L) 7. 5(L) 7. 9(L)   Hematocrit 36.6 - 50.3 % 24. 5(L) 22. 0(L) 23. 9(L) 24. 3(L) 24. 1(L) 23. 5(L) 24. 5(L)   MCV 80.0 - 99.0 FL 82.8 84.3 84.8 82.1 83.4 81.0 81.1   Platelets 558 - 274 K/uL 249 222 248 270 274 268 256   Lymphocytes 12 - 49 % - - - - - - -   Monocytes 5 - 13 % - - - - - - -   Eosinophils 0 - 7 % - - - - - - -   Basophils 0 - 1 % - - - - - - -   Bands 0 - 6 % - - - - - - -   Albumin 3.5 - 5.0 g/dL - 2. 7(L) 2. 6(L) 2. 8(L) 2. 8(L) 2. 5(L) 2. 6(L)   Calcium 8.5 - 10.1 MG/DL - 7. 6(L) 8.0(L) 8.4(L) 8.4(L) 7. 9(L) 7. 8(L)   Glucose 65 - 100 mg/dL - 103(H) 105(H) 87 114(H) 131(H) 123(H)   BUN 6 - 20 MG/DL - 19 22(H) 23(H) 24(H) 18 16   Creatinine 0.70 - 1.30 MG/DL - 0.84 0.83 0.94 0.90 0.83 0.76   Sodium 136 - 145 mmol/L - 133(L) 134(L) 133(L) 132(L) 135(L) 133(L)   Potassium 3.5 - 5.1 mmol/L - 3.8 4.3 4.0 4.2 3.6 3.6   TSH 0.36 - 3.74 uIU/mL - - - - - - -   LDH 85 - 241 U/L - 169 218 199 216 168 196   Some recent data might be hidden     Lab Results   Component Value Date/Time    TSH 2.26 04/10/2022 03:00 AM    TSH 1.68 03/01/2022 11:50 AM    TSH 1.47 05/26/2021 10:44 PM    TSH 1.97 05/20/2021 04:28 PM    TSH 1.41 02/09/2021 03:05 PM    TSH 1.740 08/14/2018 09:58 AM    TSH 1.710 10/18/2017 12:00 AM         HISTORY:  PAST MEDICAL HISTORY:  Past Medical History:   Diagnosis Date    CAD (coronary artery disease) '90 '97, '13 x 2    MI, code ice in 2013 at [facility]    Calculus of kidney     Colonic polyps     Congestive heart failure, unspecified     Diabetes ([facility] [address]     Gastritis     Hypercholesterolemia     Sleep apnea        PAST SURGICAL HISTORY:  Past Surgical History:   Procedure Laterality Date COLONOSCOPY  04/06/2011    16, due 21    COLONOSCOPY N/A 3/2/2022    [facility]    [facility]      11, due 16    HX CORONARY ARTERY BYPASS GRAFT  8/22/12    x 4 vessel by MISSY Ramirez    HX HEENT      LASIK    HX PACEMAKER PLACEMENT  1/30/13    LA CARDIAC SURG PROCEDURE UNLIST  2012    x 4 vessels       FAMILY HISTORY:  Family History   Problem Relation Age of Onset    Heart Disease Mother         MI    Heart Disease Father         CAD & PVD    Lung Disease Father     Cancer Father         lung    Diabetes Maternal Grandmother     Heart Disease Other         CAD    Stroke Sister        SOCIAL HISTORY:  Social History     Socioeconomic History    Marital status:    Tobacco Use    Smoking status: Never Smoker    Smokeless tobacco: Never Used   Vaping Use    Vaping Use: Never used   Substance and Sexual Activity    Alcohol use:  Yes     Alcohol/week: 0.0 standard drinks     Comment:  VERY RARE    Drug use: No       ALLERGY:  Allergies   Allergen Reactions    Oxycodone Anaphylaxis    Pcn [Penicillins] Hives        CURRENT MEDICATIONS:    Current Facility-Administered Medications:     sodium chloride (OCEAN) 0.65 % nasal squeeze bottle 2 Spray, 2 Spray, Both Nostrils, Q2H PRN, Terry, Danica B, NP    docusate sodium (COLACE) capsule 100 mg, 100 mg, Oral, DAILY, Terry, Danica B, NP, 100 mg at 04/25/22 1036    eplerenone (INSPRA) tablet 25 mg, 25 mg, Oral, DAILY, Terry, Danica B, NP, 25 mg at 04/25/22 1036    DOBUTamine (DOBUTREX) 1,000 mg/250 mL (4,000 mcg/mL) infusion, 6 mcg/kg/min, IntraVENous, CONTINUOUS, Terry, Danica B, NP, Last Rate: 7.8 mL/hr at 04/25/22 1242, 6 mcg/kg/min at 04/25/22 1242    potassium chloride SR (KLOR-CON 10) tablet 20 mEq, 20 mEq, Oral, DAILY, Terry, Danica B, NP, 20 mEq at 04/25/22 1036    guaiFENesin (ROBITUSSIN) 100 mg/5 mL oral liquid 100 mg, 100 mg, Oral, Q4H PRN, Latonia Stock MD, 100 mg at 04/22/22 2314    losartan (COZAAR) tablet 25 mg, 25 mg, Oral, DAILY, Terry, Danica B, NP, 25 mg at 04/25/22 1036    bumetanide (BUMEX) injection 2 mg, 2 mg, IntraVENous, BID, Terry, Danica B, NP, 2 mg at 04/25/22 1037    polyethylene glycol (MIRALAX) packet 17 g, 17 g, Oral, DAILY, Cecilia JACOBSON, NP, 17 g at 04/25/22 1036    senna-docusate (PERICOLACE) 8.6-50 mg per tablet 1 Tablet, 1 Tablet, Oral, DAILY PRN, Cecilia JACOBSON, NP, 1 Tablet at 04/24/22 0913    oxymetazoline (AFRIN) 0.05 % nasal spray 2 Spray, 2 Spray, Both Nostrils, BID PRN, Cecilia JACOBSON, NP, 2 [place] at 04/24/22 1612    hydrALAZINE (APRESOLINE) tablet 50 mg, 50 mg, Oral, Q8H, Polliard, Sofiya Flight T, NP, 50 mg at 04/25/22 1244    amiodarone (CORDARONE) tablet 200 mg, 200 mg, Oral, BID, Polliard, Sofiya Flight T, NP, 200 mg at 04/25/22 1036    colchicine tablet 0.6 mg, 0.6 mg, Oral, DAILY, Polliard, Sofiya Flight T, NP, 0.6 mg at 04/25/22 1036    alteplase (CATHFLO) 1 mg in sterile water (preservative free) 1 mL injection, 1 mg, InterCATHeter, PRN, Emmanuelle Aiken MD, 1 mg at 04/22/22 2310    bisacodyL (DULCOLAX) suppository 10 mg, 10 mg, Rectal, DAILY PRN, Gerardo Cazares MD, 10 mg at 04/12/22 1533    gabapentin (NEURONTIN) capsule 200 mg, 200 mg, Oral, TID, Polliard, Sofiya Flight T, NP, 200 mg at 04/25/22 1036    albumin human 25% (BUMINATE) solution 12.5 g, 12.5 g, IntraVENous, BID, Polliard, Sofiya Flight T, NP, 12.5 g at 04/25/22 1035    famotidine (PEPCID) tablet 20 mg, 20 mg, Oral, Q12H, Gerardo Cazares MD, 20 mg at 04/25/22 1036    mirtazapine (REMERON) tablet 7.5 mg, 7.5 mg, Oral, QHS, Terry, Danica B, NP, 7.5 mg at 04/24/22 2117    lidocaine 4 % patch 1 Patch, 1 Patch, TransDERmal, Q24H, Terry, Danica B, NP, 1 Patch at 04/25/22 1244    Warfarin NP/MD dosing, , Other, PRN, Terry, Danica B, NP    melatonin tablet 3 mg, 3 mg, Oral, QHS PRN, [place] MD, 3 mg at 04/20/22 2127    hydrALAZINE (APRESOLINE) 20 mg/mL injection 10 mg, 10 mg, IntraVENous, Q6H PRN, Radha Wetzel B, NP, 10 mg at 04/24/22 1415    aspirin chewable tablet 81 mg, 81 mg, Oral, DAILY, Radha Jonesach B, NP, 81 mg at 04/25/22 1036    hydrALAZINE (APRESOLINE) 20 mg/mL injection 5 mg, 5 mg, IntraVENous, Q6H PRN, Radha Broach B, NP, 5 mg at 04/13/22 4246    sodium chloride (NS) flush 5-40 mL, 5-40 mL, IntraVENous, Q8H, Polliard, Yarelis Bay T, NP, 10 mL at 04/25/22 2182    sodium chloride (NS) flush 5-40 mL, 5-40 mL, IntraVENous, PRN, Marvin, Yarelis Varmar WINSTON, NP, 10 mL at 04/24/22 0914    0.9% sodium chloride infusion, 9 mL/hr, IntraVENous, CONTINUOUS, Chris Wong NP, Last Rate: 3 mL/hr at 04/20/22 0513, 3 mL/hr at 04/20/22 3235    acetaminophen (TYLENOL) tablet 650 mg, 650 mg, Oral, Q6H PRN, Chris Wong NP, 650 mg at 04/25/22 1047    naloxone (NARCAN) injection 0.4 mg, 0.4 mg, IntraVENous, PRN, Chris Wong NP    ondansetron (ZOFRAN) injection 4 mg, 4 mg, IntraVENous, Q4H PRN, Yarelis Wong NP, 4 mg at 04/11/22 2139    albuterol-ipratropium (DUO-NEB) 2.5 MG-0.5 MG/3 ML, 3 mL, Nebulization, Q6H PRN, Yarelis Wong NP    glucose chewable tablet 16 g, 4 Tablet, Oral, PRN, Chris Wong NP    glucagon (GLUCAGEN) injection 1 mg, 1 mg, IntraMUSCular, PRN, Yarelis Wong NP    insulin lispro (HUMALOG) injection, , SubCUTAneous, AC&HS, Chris Wong NP, 2 Units at 04/24/22 1310    sucralfate (CARAFATE) tablet 1 g, 1 g, Oral, AC&HS, Chris Wong NP, 1 g at 04/25/22 1244    0.45% sodium chloride infusion, 10 mL/hr, IntraVENous, CONTINUOUS, Annie Najera MD, Stopped at 04/10/22 0730    fentaNYL citrate (PF) injection 25 mcg, 25 mcg, IntraVENous, Q2H PRN, Chris Wong NP, 25 mcg at 04/25/22 1251    PATIENT CARE TEAM:  Patient Care Team:  Risa José MD as PCP - General (Internal Medicine)  Risa José MD as PCP - [facility] Provider  Man Tubbs MD as Physician (Cardiology)  Nessa Rudd MD as Physician (Gastroenterology)  Kristen Wheeler MD as Physician (Orthopedic Surgery)  Chelle Durham MD as Physician (Ophthalmology)  Darvin Escamilla MD (Cardiology)  Radha Singh MD (Cardiology)       Thank you for allowing us to participate in this patient's care. Dennie Bevels, EARNESTINE [place] Jennifer  [address], [address]  Phone: [phone]    [facility] ATTENDING ADDENDUM    Patient was seen and examined in person. Data and notes were reviewed. I have discussed and agree with the plan as noted in the NP note above without further additions.     Deepti Hill MD PhD  Natali Cesar

## 2022-04-25 NOTE — PROGRESS NOTES
Comprehensive Nutrition Assessment    Type and Reason for Visit: Reassess    Nutrition Recommendations/Plan:      Continue current diet with ONS at all meals    Nutrition Assessment:       Past Medical History:   Diagnosis Date    CAD (coronary artery disease) '90 '97, '13 x 2    MI, code ice in 2013 at [facility]    Calculus of kidney     Colonic polyps     Congestive heart failure, unspecified     Diabetes ([facility] [address]     Gastritis     Hypercholesterolemia     Sleep apnea      PMHx includes CHF due to Ischemic Cardiomyopathy, LVEF 15-20% and NYHA Class IV, CAD s/p 4v CABG, s/p AICD, HTN, DM2, and SRUTHI on CPAP who was admitted for planned implant of LVAD. Now POD 13 s/p LVAD implant with HM3 as DT due to age (4/5/22). Post-op course complicated by RV failure requiring prolonged inotropic support with dobutamine, congestive hepatopathy, superficial soft tissue infection at sternotomy incision requiring resumption of antibiotics. Pt screened for LOS. Noted nursing documentation supporting good PO intake. Attempted to meet with pt, but PT came by to work with him. RN confirmed excellent PO intake and consuming ONS. Weight presently up d/t fluid. Standing scale on admission ~166 lb, which indicates wt loss if previous weights accurate/ not skewed d/t fluid. Suspect actual wt loss has taken place as appears pt was previously admitted 5/2021 for LVAD w/u, but was found to have a pulmonary nodule during w/u and found to have adenocarcinoma of the lung, so LVAD was deferred pending treatment. Pt has now completed XRT and was cleared for LVAD by heme/onc. From visual glance today, pt appeared to have significant wasting. However, RD assessment last year prior to above, indicates pt with severe wasting at that time as well. Admission wt ~90% IBW and BMI ~21.9 kg/m² supporting underweight status for age.     Happy to hear pt is eating well and consuming ONS (switched to Glucerna yesterday d/t BG and continues to drink without issue - likes all flavors). Will check back with pt when available as he is certainly at risk for malnutrition and likely meets criteria. 4/25:  Met with pt in room. NFPE reveals significant muscle wasting and loss of SQ fat. Pt reports a UBW of 232 lb ~ 1 year ago (66 lb wt loss, 28% BW). States he feel likes he eats well, but continued to lose weight. ?cardiac cachexia. Pt states that is what someone mentioned to him before and part of why he knew he had to get the surgery. Throughout this hospital stay, he feels likes he has been eating well overall and drinking 3 ONS/day. However, today he was in pain, so lunch was less than optimal.  Documentation supports sub-optimal oral intake for past week or so. Pt states they send items he won't eat (I.e blintz and sauteed veggies). Pt has yet to see a menu, so provided him with one. Encouraged him to call and place orders. Weight trending back down towards admission wt, still up with noted edema.     Malnutrition Assessment:  Malnutrition Status:  Severe malnutrition    Context:  Chronic illness     Findings of the 6 clinical characteristics of malnutrition:   Energy Intake:  No significant decrease in energy intake  Weight Loss:  Greater than 20% over 1 year     Body Fat Loss:  Severe body fat loss, Triceps   Muscle Mass Loss:  Severe muscle mass loss, Temples (temporalis),Clavicles (pectoralis &deltoids),Hand (interosseous)  Fluid Accumulation:  No significant fluid accumulation,     Strength:  Not performed         Nutritionally Significant Medications:   Buminate, dobutamine, colace, pepcid, fentanyl, SSI, toradol, remeron, miralax, klor, carafate QID  PRN: dulcolax suppository, pericolace    Estimated Daily Nutrient Needs:   Energy (kcal): 4912-9466 (30-35 kcal/kg)  Wt used: Admission (75.3 kg)  Protein (gm): 115-135 (1.5-1.8 gm/kg)    Fluid (mL/day): 1 mL/kcal       Nutrition Related Findings:   Edema:  LLE: 1+; Pitting (4/24/2022 8:55 PM)  RLE: 1+; Pitting (4/24/2022  8:55 PM)      Last BM: 04/20/22, Formed    Wounds:    Moisture associate skin damage       Current Nutrition Therapies:  Diet: consistent CHO 60 gm, NCS  Supplements: Glucerna TID with meals  Meal intake:   Patient Vitals for the past 168 hrs:   % Diet Eaten   04/25/22 1414 26 - 50%   04/21/22 1250 51 - 75%   04/21/22 0409 0%   04/21/22 0000 0%   04/20/22 1947 0%   04/19/22 0800 51 - 75%     Supplement intake:  Patient Vitals for the past 168 hrs:   Supplement intake %   04/21/22 0409 0%   04/21/22 0000 0%   04/20/22 1947 0%       Anthropometric Measures:  · Height:  6' 1\" (185.4 cm)  · Current Body Wt:  90.5 kg (199 lb 8 oz)   · Admission Body Wt:  166 lb 0.1 oz (75.3 kg)   · Ideal Body Wt:  184 lbs:  108.4 %   · BMI Category:  Underweight (BMI less than 22) age over 72 (using admission wt)       Wt Readings from Last 20 Encounters:   04/25/22 82.8 kg (182 lb 8.7 oz)   04/18/22 90.5 kg (199 lb 8 oz)   03/31/22 75.3 kg (166 lb)   03/08/22 75.4 kg (166 lb 3.2 oz)   03/04/22 75.4 kg (166 lb 3.6 oz)   02/24/22 76.5 kg (168 lb 9.6 oz)   02/14/22 76.7 kg (169 lb)   02/11/22 78.8 kg (173 lb 12.8 oz)   02/05/22 80.9 kg (178 lb 5.6 oz)   01/31/22 88 kg (194 lb)   01/24/22 88.7 kg (195 lb 9.6 oz)   01/19/22 88.9 kg (196 lb)   01/17/22 88.9 kg (196 lb)   01/12/22 89.8 kg (198 lb)   01/11/22 89.4 kg (197 lb)   12/27/21 92.5 kg (204 lb)   12/20/21 88 kg (194 lb)   12/15/21 87.1 kg (192 lb)   12/08/21 88 kg (194 lb)   12/06/21 88 kg (194 lb)   12/01/21 88.5 kg (195 lb)       Nutrition Diagnosis:   · Increased nutrient needs related to cardiac dysfunction,catabolic illness as evidenced by BMI,weight loss (CHF, recent LVAD)      Nutrition Interventions:   Food and/or Nutrient Delivery: Continue current diet,Continue oral nutrition supplement  Nutrition Education and Counseling: No recommendations at this time  Coordination of Nutrition Care: Continue to monitor while inpatient    Goals:  continued PO intake >/=75% of most meals with 2-3 ONS daily over next 7 days       Nutrition Monitoring and Evaluation:   Behavioral-Environmental Outcomes: None identified  Food/Nutrient Intake Outcomes: Food and nutrient intake,Supplement intake  Physical Signs/Symptoms Outcomes: Biochemical data,Meal time behavior,Fluid status or edema,Hemodynamic status,Nutrition focused physical findings,Weight      Discharge Planning:    Continue oral nutrition supplement     Recent Labs     04/25/22  1434 04/25/22  0338 04/24/22  0422 04/23/22  0409   * 103* 105* 87   BUN 21* 19 22* 23*   CREA 0.90 0.84 0.83 0.94   * 133* 134* 133*   K 4.1 3.8 4.3 4.0   CL 98 98 99 99   CO2 29 29 27 29   CA 8.0* 7.6* 8.0* 8.4*   MG  --  2.1 2.1 2.0         Recent Labs     04/25/22  1108 04/25/22  0649 04/24/22  2106 04/24/22  1627 04/24/22  1224 04/24/22  0702 04/23/22  2133 04/23/22  1120 04/23/22  0626 04/22/22  2153   GLUCPOC 199* 111 180* 126* 229* 135* 191* 198* 120* 203*       Lab Results   Component Value Date/Time    Hemoglobin A1c 6.8 (H) 04/04/2022 03:38 AM    Hemoglobin A1c 6.7 (H) 03/02/2022 03:43 AM    Hemoglobin A1c 6.8 (H) 01/11/2022 11:34 AM         Cheyenne Chaudhry RD  Available via [address] [place] [address]

## 2022-04-25 NOTE — PROGRESS NOTES
Physical Therapy  4/25/2022    14:06-- F/u with patient to attempt mobility. He politely requested PT defer mobility at this time due to abdominal pain. Stated the pain meds he received were just starting to work. Will f/u tomorrow. Chart reviewed. Patient currently with increased pain and just received fentanyl dose. F/u later as able. Thank you.     Camila Tinoco, PT, DPT

## 2022-04-25 NOTE — PROGRESS NOTES
Problem: Self Care Deficits Care Plan (Adult)  Goal: *Acute Goals and Plan of Care (Insert Text)  Description: FUNCTIONAL STATUS PRIOR TO ADMISSION: pt lives with wife, independent prior    HOME SUPPORT: The patient lived with wife but did not require assist.    Occupational Therapy Goals  Goals reviewed and upgraded as follows 4/25/2022  1. Patient will perform ADLs standing 5 mins without fatigue or LOB with modified independence within 7 day(s). upgraded  2. Patient will perform lower body ADLs with modified independence using legs crossed method within 7 day(s). upgraded  3. Patient will perform upper body ADLs with modified independence within 7 day(s). upgraded  4. Patient will perform toilet transfers with modified independence within 7 day(s). upgraded  5. Patient will perform all aspects of toileting with modified independence within 7 day(s). upgraded  6. Patient will participate in cardiac/sternal upper extremity therapeutic exercise/activities to increase independence with ADLs with supervision/set-up for 5 minutes within 7 day(s). continue  7. Patient will perform VAD switchover routine as part of ADL routine (including batteries<>batteries, battery clip<>battery clip, mock SC<>SC) with stand by/set-up assistance within 7 days. upgraded     Goals reviewed and remain appropriate 4/18/2022  Initiated 4/8/2022  1. Patient will perform ADLs standing 5 mins without fatigue or LOB with supervision/set-up within 7 day(s). 2.  Patient will perform lower body ADLs with minimal assistance/contact guard assist within 7 day(s). 3.  Patient will perform upper body ADLs with minimal assistance/contact guard assist within 7 day(s). 4.  Patient will perform toilet transfers with minimal assistance/contact guard assist within 7 day(s). 5.  Patient will perform all aspects of toileting with minimal assistance/contact guard assist within 7 day(s).   6.  Patient will participate in cardiac/sternal upper extremity therapeutic exercise/activities to increase independence with ADLs with supervision/set-up for 5 minutes within 7 day(s). 7.  Patient will perform VAD switchover routine as part of ADL routine (including batteries<>batteries, battery clip<>battery clip, mock SC<>SC) with minimal assistance/contact guard assist within 7 days. Outcome: Progressing Towards Goal   OCCUPATIONAL THERAPY TREATMENT/WEEKLY RE-ASSESSMENT  Patient: Abdi Hartley (67 y.o. male)  Date: 4/25/2022  Diagnosis: HFrEF (heart failure with reduced ejection fraction) ([facility]) [I50.20] <principal problem not specified>  Procedure(s) (LRB):  REDO STERNOTOMY; RIGHT GROIN CUTDOWN FOR CANNULATION;  INSERTION OF LEFT VENTRICULAR ASSIST DEVICE (HMIII); AORTIC VALVE CLOSURE CHEL BY DR. Neldon Lennox. (N/A) 20 Days Post-Op  Precautions: Fall (mindful movement, LVAD)  Chart, occupational therapy assessment, plan of care, and goals were reviewed. ASSESSMENT  Patient continues with skilled OT services and is progressing towards goals. Patient presents with fatigue, impaired activity tolerance, generalized deconditioning, improved overall balance, and fair carryover of education from prior sessions. Patient received sitting in recliner and agreeable to working with therapy. Patient participating in post op cardiac exercises in sitting and standing, see below for details. Patient then performing a switchover from power module to batteries generally with supervision but did require min assist to untangle cords during switchover. Patient generally reporting fatigue today but continues to progress toward goals, goals were reviewed and adjusted as above. Patient would benefit from skilled OT services during admission to improve independence with self care and functional mobility/transfers. Recommend discharge to [address] [place] [address] at this time.      Current Level of Function Impacting Discharge (ADLs): supervision for switchover with min A to untangle cords, supervision for mobility/transfers    Other factors to consider for discharge: new LVAD, good family support, progress toward goals         PLAN :  Goals have been updated based on progression since last assessment. Patient continues to benefit from skilled intervention to address the above impairments. Continue to follow patient 5 times a week to address goals. Recommend with staff: up to chair for all meals, functional mobility to and from bathroom for toileting    Recommend next OT session: continue POC    Recommendation for discharge: (in order for the patient to meet his/her long term goals)  Occupational therapy at least 2 days/week in the home AND ensure assist and/or supervision for safety with LVAD maintenance    This discharge recommendation:  Has been made in collaboration with the attending provider and/or case management    IF patient discharges home will need the following DME: shower chair       SUBJECTIVE:   Patient stated I'm feeling tired today.     OBJECTIVE DATA SUMMARY:   Cognitive/Behavioral Status:  Neurologic State: Alert                   Functional Mobility and Transfers for ADLs:  Bed Mobility:  Scooting: Supervision    Transfers:  Sit to Stand: Supervision          Balance:  Sitting: Intact  Standing: Intact; Without support  Standing - Static: Good    ADL Intervention:                      Upper Body Dressing Assistance  Front Opened Shirt: Set-up (to don vest)           Patient instructed and educated on mindful movement principles based on Move in The Tube concept to include maintaining bilateral elbows close to rib cage when performing any load-bearing activity such as getting in/out of bed, pushing up from a chair, opening a door, or lifting a box. Patient was given a handout with diagrams of each correct/incorrect method of performing each of the above tasks.      Patient instructed on the ability to utilize upper extremities outside the tube when doing any non-load bearing activity such as washing hair/body, brushing teeth, retrieving clothing items, or scratching your back. Patient encouraged to also perform upper extremity exercises \"outside of the tube\" to prevent scar tissue formation around sternal incision site. Patient instructed no asymmetrical reaching over head to ensure B UEs when shoulders >90* i.e. reaching in cabinets and dressing. Instruction on upper body dressing techniques of over head, then arms through to decrease pain and unilateral shoulder flexion >90*. Instruction on the benefits of utilizing B UEs during functional tasks i.e. opening the fridge, stepping into the tub. Therapeutic Exercises:   Patient instructed on the benefits and demonstrated cardiac exercises while sitting and standing with Stand-by assistance. Instructed and indicated understanding on how to progress reps, sets against gravity, working up to 5 lbs, standing and so on based on surgeon clearance for more weight in prep for basic and instrumental ADLs. Instruction on the use of household items in place of weights as needed.      CARDIAC   EXERCISE    Sets    Reps    Active  Active Assist    Passive  Self ROM    Comments    Shoulder flexion  1  5   [x]                            []                             []                             []                             In sitting   Shoulder abduction  1  5  [x]                             []                             []                             []                             In standing   Scapular elevation  1  5  [x]                             []                              []                             []                             In sitting   Scapular retraction  1  5  [x]                             []                             []                             []                             In sitting   Trunk rotation  1  5  [x]                             []                             [] []                             In sitting   Trunk sidebending  1  5  [x]                             []                              []                             []                                   In standing       Pain:  Minimal complaints of pain throughout    Activity Tolerance:   Fair    After treatment patient left in no apparent distress:   Sitting in chair and Call bell within reach    COMMUNICATION/COLLABORATION:   The patients plan of care was discussed with: Physical therapist and Registered nurse.      KENAN Lee/L  Time Calculation: 18 mins

## 2022-04-25 NOTE — PROGRESS NOTES
Transitions of Care Plan   RUR: 19% - moderate   Clinical Update: c/o abdominal pain today; LVAD recovery; dobutamine gtt   Consults: [facility]; Wound Care; Therapy   Baseline: independent without DME; resides w spouse   Barrier(s) to Discharge: medical   Disposition: home health - [facility] accepted; LVAD and dobutamine   Estimated Discharge Date: 4/29/22    CM updated by [facility] NP. Patient will need dobutamine at time of discharge. Anticipate patient may be ready by end of the week. [facility] accepted patient for home health services once stable for discharge. Disposition:  Home Health:  [address] [place] [address] Infusion: [facility] (dobutamine)    Susan Singh, [address]  Available via [employer] or

## 2022-04-25 NOTE — PROGRESS NOTES
0730: Bedside shift change report given to Tamera Delong and Katrina Gaviria (oncoming nurse) by Jodi Crook and Ashleigh (offgoing nurse). Report included the following information SBAR, Kardex, OR Summary, Intake/Output, MAR and Recent Results. 1930: Bedside shift change report given to Ara Jennings (oncoming nurse) by Tamera Delong (offgoing nurse). Report included the following information SBAR, Kardex, Intake/Output, MAR and Recent Results. Charting and patient care of [address] [place] [address] by Tez Jeter from 0730 to 2000 was supervised and reviewed by this RN.

## 2022-04-25 NOTE — PROGRESS NOTES
0800:  Preceptor Review of RN Work    4/25/2022  - Shift times - 1930  to 0830    The RN documentation of patient care for [address] [place] [address] has been reviewed and approved. All medications have been administered under the direct supervision of preceptor.     Rona Perez RN

## 2022-04-25 NOTE — PROGRESS NOTES
1715: LVAD driveline dressing change performed by patient's wife with this RN. Patient's wife is still benefiting from additional practice with donning sterile gloves and setting up sterile field. Once sterile field set up and sterile gloves donned, the patient's wife was able to complete the steps of the driveline dressing change with minimal prompting. Driveline site is healing well; small amount of serous drainage on old dressing, but no active drainage from site; stitch still intact; periwound skin intact without erythema.

## 2022-04-26 ENCOUNTER — APPOINTMENT (OUTPATIENT)
Dept: GENERAL RADIOLOGY | Age: 77
DRG: 001 | End: 2022-04-26
Attending: NURSE PRACTITIONER
Payer: MEDICARE

## 2022-04-26 ENCOUNTER — APPOINTMENT (OUTPATIENT)
Dept: NON INVASIVE DIAGNOSTICS | Age: 77
DRG: 001 | End: 2022-04-26
Attending: NURSE PRACTITIONER
Payer: MEDICARE

## 2022-04-26 PROBLEM — E43 SEVERE PROTEIN-CALORIE MALNUTRITION (HCC): Status: ACTIVE | Noted: 2021-05-21

## 2022-04-26 LAB
ABO + RH BLD: NORMAL
ALBUMIN SERPL-MCNC: 2.7 G/DL (ref 3.5–5)
ALBUMIN/GLOB SERPL: 0.8 {RATIO} (ref 1.1–2.2)
ALP SERPL-CCNC: 138 U/L (ref 45–117)
ALT SERPL-CCNC: 27 U/L (ref 12–78)
ANION GAP SERPL CALC-SCNC: 6 MMOL/L (ref 5–15)
AST SERPL-CCNC: 74 U/L (ref 15–37)
BILIRUB SERPL-MCNC: 0.6 MG/DL (ref 0.2–1)
BLOOD GROUP ANTIBODIES SERPL: NORMAL
BNP SERPL-MCNC: 2791 PG/ML
BUN SERPL-MCNC: 23 MG/DL (ref 6–20)
BUN/CREAT SERPL: 24 (ref 12–20)
CALCIUM SERPL-MCNC: 8.2 MG/DL (ref 8.5–10.1)
CHLORIDE SERPL-SCNC: 98 MMOL/L (ref 97–108)
CO2 SERPL-SCNC: 29 MMOL/L (ref 21–32)
CREAT SERPL-MCNC: 0.94 MG/DL (ref 0.7–1.3)
ECHO AO ROOT DIAM: 4.2 CM
ECHO AO ROOT INDEX: 2.04 CM/M2
ECHO LA DIAMETER INDEX: 1.46 CM/M2
ECHO LA DIAMETER: 3 CM
ECHO LA TO AORTIC ROOT RATIO: 0.71
ECHO LV FRACTIONAL SHORTENING: 6 % (ref 28–44)
ECHO LV INTERNAL DIMENSION DIASTOLE INDEX: 2.38 CM/M2
ECHO LV INTERNAL DIMENSION DIASTOLIC: 4.9 CM (ref 4.2–5.9)
ECHO LV INTERNAL DIMENSION SYSTOLIC INDEX: 2.23 CM/M2
ECHO LV INTERNAL DIMENSION SYSTOLIC: 4.6 CM
ECHO LV IVSD: 1.3 CM (ref 0.6–1)
ECHO LV MASS 2D: 213.3 G (ref 88–224)
ECHO LV MASS INDEX 2D: 103.5 G/M2 (ref 49–115)
ECHO LV POSTERIOR WALL DIASTOLIC: 1 CM (ref 0.6–1)
ECHO LV RELATIVE WALL THICKNESS RATIO: 0.41
ECHO RV TAPSE: 0.5 CM (ref 1.7–?)
ECHO TV REGURGITANT MAX VELOCITY: 2.83 M/S
ECHO TV REGURGITANT PEAK GRADIENT: 32 MMHG
ERYTHROCYTE [DISTWIDTH] IN BLOOD BY AUTOMATED COUNT: 19.9 % (ref 11.5–14.5)
GLOBULIN SER CALC-MCNC: 3.2 G/DL (ref 2–4)
GLUCOSE BLD STRIP.AUTO-MCNC: 108 MG/DL (ref 65–117)
GLUCOSE BLD STRIP.AUTO-MCNC: 124 MG/DL (ref 65–117)
GLUCOSE BLD STRIP.AUTO-MCNC: 145 MG/DL (ref 65–117)
GLUCOSE BLD STRIP.AUTO-MCNC: 191 MG/DL (ref 65–117)
GLUCOSE SERPL-MCNC: 109 MG/DL (ref 65–100)
HCT VFR BLD AUTO: 24.8 % (ref 36.6–50.3)
HGB BLD-MCNC: 7.8 G/DL (ref 12.1–17)
INR PPP: 2.9 (ref 0.9–1.1)
LDH SERPL L TO P-CCNC: 190 U/L (ref 85–241)
MAGNESIUM SERPL-MCNC: 2.2 MG/DL (ref 1.6–2.4)
MCH RBC QN AUTO: 26.4 PG (ref 26–34)
MCHC RBC AUTO-ENTMCNC: 31.5 G/DL (ref 30–36.5)
MCV RBC AUTO: 83.8 FL (ref 80–99)
NRBC # BLD: 0 K/UL (ref 0–0.01)
NRBC BLD-RTO: 0 PER 100 WBC
PLATELET # BLD AUTO: 259 K/UL (ref 150–400)
PMV BLD AUTO: 9.7 FL (ref 8.9–12.9)
POTASSIUM SERPL-SCNC: 4 MMOL/L (ref 3.5–5.1)
PROT SERPL-MCNC: 5.9 G/DL (ref 6.4–8.2)
PROTHROMBIN TIME: 28.6 SEC (ref 9–11.1)
RBC # BLD AUTO: 2.96 M/UL (ref 4.1–5.7)
SERVICE CMNT-IMP: ABNORMAL
SERVICE CMNT-IMP: NORMAL
SODIUM SERPL-SCNC: 133 MMOL/L (ref 136–145)
SPECIMEN EXP DATE BLD: NORMAL
WBC # BLD AUTO: 4.7 K/UL (ref 4.1–11.1)

## 2022-04-26 PROCEDURE — 93750 INTERROGATION VAD IN PERSON: CPT | Performed by: NURSE PRACTITIONER

## 2022-04-26 PROCEDURE — 97530 THERAPEUTIC ACTIVITIES: CPT

## 2022-04-26 PROCEDURE — 97535 SELF CARE MNGMENT TRAINING: CPT

## 2022-04-26 PROCEDURE — 74011250636 HC RX REV CODE- 250/636: Performed by: NURSE PRACTITIONER

## 2022-04-26 PROCEDURE — 93308 TTE F-UP OR LMTD: CPT

## 2022-04-26 PROCEDURE — 83735 ASSAY OF MAGNESIUM: CPT

## 2022-04-26 PROCEDURE — 74011000250 HC RX REV CODE- 250: Performed by: NURSE PRACTITIONER

## 2022-04-26 PROCEDURE — 97116 GAIT TRAINING THERAPY: CPT

## 2022-04-26 PROCEDURE — 82962 GLUCOSE BLOOD TEST: CPT

## 2022-04-26 PROCEDURE — 85610 PROTHROMBIN TIME: CPT

## 2022-04-26 PROCEDURE — 74011250637 HC RX REV CODE- 250/637: Performed by: NURSE PRACTITIONER

## 2022-04-26 PROCEDURE — P9047 ALBUMIN (HUMAN), 25%, 50ML: HCPCS | Performed by: NURSE PRACTITIONER

## 2022-04-26 PROCEDURE — 80053 COMPREHEN METABOLIC PANEL: CPT

## 2022-04-26 PROCEDURE — 74011250637 HC RX REV CODE- 250/637: Performed by: INTERNAL MEDICINE

## 2022-04-26 PROCEDURE — 65660000001 HC RM ICU INTERMED STEPDOWN

## 2022-04-26 PROCEDURE — 71045 X-RAY EXAM CHEST 1 VIEW: CPT

## 2022-04-26 PROCEDURE — 85027 COMPLETE CBC AUTOMATED: CPT

## 2022-04-26 PROCEDURE — 86900 BLOOD TYPING SEROLOGIC ABO: CPT

## 2022-04-26 PROCEDURE — 83615 LACTATE (LD) (LDH) ENZYME: CPT

## 2022-04-26 PROCEDURE — 97110 THERAPEUTIC EXERCISES: CPT

## 2022-04-26 PROCEDURE — 83880 ASSAY OF NATRIURETIC PEPTIDE: CPT

## 2022-04-26 PROCEDURE — 36415 COLL VENOUS BLD VENIPUNCTURE: CPT

## 2022-04-26 PROCEDURE — 99232 SBSQ HOSP IP/OBS MODERATE 35: CPT | Performed by: NURSE PRACTITIONER

## 2022-04-26 RX ORDER — WARFARIN 4 MG/1
4 TABLET ORAL ONCE
Status: COMPLETED | OUTPATIENT
Start: 2022-04-26 | End: 2022-04-26

## 2022-04-26 RX ORDER — BUMETANIDE 1 MG/1
2 TABLET ORAL 2 TIMES DAILY
Status: DISCONTINUED | OUTPATIENT
Start: 2022-04-26 | End: 2022-04-28

## 2022-04-26 RX ORDER — AMIODARONE HYDROCHLORIDE 200 MG/1
200 TABLET ORAL DAILY
Status: DISCONTINUED | OUTPATIENT
Start: 2022-04-27 | End: 2022-05-06 | Stop reason: HOSPADM

## 2022-04-26 RX ORDER — LOSARTAN POTASSIUM 25 MG/1
25 TABLET ORAL 2 TIMES DAILY
Status: DISCONTINUED | OUTPATIENT
Start: 2022-04-26 | End: 2022-04-27

## 2022-04-26 RX ADMIN — SODIUM CHLORIDE, PRESERVATIVE FREE 10 ML: 5 INJECTION INTRAVENOUS at 21:45

## 2022-04-26 RX ADMIN — FAMOTIDINE 20 MG: 20 TABLET ORAL at 09:20

## 2022-04-26 RX ADMIN — LOSARTAN POTASSIUM 25 MG: 25 TABLET, FILM COATED ORAL at 09:19

## 2022-04-26 RX ADMIN — HYDRALAZINE HYDROCHLORIDE 50 MG: 50 TABLET, FILM COATED ORAL at 21:41

## 2022-04-26 RX ADMIN — SUCRALFATE 1 G: 1 TABLET ORAL at 12:47

## 2022-04-26 RX ADMIN — GABAPENTIN 200 MG: 100 CAPSULE ORAL at 21:41

## 2022-04-26 RX ADMIN — GABAPENTIN 200 MG: 100 CAPSULE ORAL at 18:34

## 2022-04-26 RX ADMIN — POTASSIUM CHLORIDE 20 MEQ: 750 TABLET, EXTENDED RELEASE ORAL at 09:20

## 2022-04-26 RX ADMIN — BUMETANIDE 2 MG: 0.25 INJECTION, SOLUTION INTRAMUSCULAR; INTRAVENOUS at 09:16

## 2022-04-26 RX ADMIN — GABAPENTIN 200 MG: 100 CAPSULE ORAL at 09:19

## 2022-04-26 RX ADMIN — DOCUSATE SODIUM 100 MG: 100 CAPSULE, LIQUID FILLED ORAL at 09:20

## 2022-04-26 RX ADMIN — EPLERENONE 25 MG: 25 TABLET, FILM COATED ORAL at 09:19

## 2022-04-26 RX ADMIN — AMIODARONE HYDROCHLORIDE 200 MG: 200 TABLET ORAL at 09:20

## 2022-04-26 RX ADMIN — HYDRALAZINE HYDROCHLORIDE 50 MG: 50 TABLET, FILM COATED ORAL at 12:47

## 2022-04-26 RX ADMIN — POLYETHYLENE GLYCOL 3350 17 G: 17 POWDER, FOR SOLUTION ORAL at 09:20

## 2022-04-26 RX ADMIN — KETOROLAC TROMETHAMINE 15 MG: 30 INJECTION, SOLUTION INTRAMUSCULAR; INTRAVENOUS at 09:40

## 2022-04-26 RX ADMIN — FAMOTIDINE 20 MG: 20 TABLET ORAL at 21:41

## 2022-04-26 RX ADMIN — SUCRALFATE 1 G: 1 TABLET ORAL at 18:34

## 2022-04-26 RX ADMIN — MIRTAZAPINE 7.5 MG: 15 TABLET, FILM COATED ORAL at 21:41

## 2022-04-26 RX ADMIN — ALBUMIN (HUMAN) 12.5 G: 0.25 INJECTION, SOLUTION INTRAVENOUS at 18:34

## 2022-04-26 RX ADMIN — SUCRALFATE 1 G: 1 TABLET ORAL at 21:41

## 2022-04-26 RX ADMIN — ASPIRIN 81 MG CHEWABLE TABLET 81 MG: 81 TABLET CHEWABLE at 09:19

## 2022-04-26 RX ADMIN — LOSARTAN POTASSIUM 25 MG: 25 TABLET, FILM COATED ORAL at 18:34

## 2022-04-26 RX ADMIN — ACETAMINOPHEN 650 MG: 325 TABLET ORAL at 19:11

## 2022-04-26 RX ADMIN — COLCHICINE 0.6 MG: 0.6 TABLET, FILM COATED ORAL at 09:19

## 2022-04-26 RX ADMIN — SODIUM CHLORIDE, PRESERVATIVE FREE 10 ML: 5 INJECTION INTRAVENOUS at 07:52

## 2022-04-26 RX ADMIN — AMIODARONE HYDROCHLORIDE 200 MG: 200 TABLET ORAL at 19:11

## 2022-04-26 RX ADMIN — SUCRALFATE 1 G: 1 TABLET ORAL at 07:51

## 2022-04-26 RX ADMIN — BUMETANIDE 2 MG: 1 TABLET ORAL at 18:34

## 2022-04-26 RX ADMIN — ALBUMIN (HUMAN) 12.5 G: 0.25 INJECTION, SOLUTION INTRAVENOUS at 09:13

## 2022-04-26 RX ADMIN — WARFARIN SODIUM 4 MG: 4 TABLET ORAL at 19:11

## 2022-04-26 RX ADMIN — DOBUTAMINE HYDROCHLORIDE 6 MCG/KG/MIN: 400 INJECTION INTRAVENOUS at 09:12

## 2022-04-26 NOTE — PROGRESS NOTES
Problem: Mobility Impaired (Adult and Pediatric)  Goal: *Acute Goals and Plan of Care (Insert Text)  Description: FUNCTIONAL STATUS PRIOR TO ADMISSION: Patient was independent and active without use of DME.    HOME SUPPORT PRIOR TO ADMISSION: The patient lived with his wife but did not require assist.    Physical Therapy Goals- Continue goals with exception of #7 for next 7 days 4/22/2022  Weekly Reassessment 4/15/2022 (goals progressed)  1. Patient will move from supine to sit and sit to supine , scoot up and down, and roll side to side in bed with head of bed flat with supervision/set-up within 7 days. 2.  Patient will perform sit to/from stand from chair height with supervision/set-up within 7 days. 3.  Patient will ambulate 300 feet stand by assist within 7 days. 4.  Patient will ascend/descend 6 stairs with handrail(s) with minimal assistance/contact guard assist within 14 days. 5.  Patient will perform cardiac exercises per protocol with supervision/set-up within 7 days. 6.  Patient will verbally and functionally recall mindful movement precautions within 7 days. 7.  Patient will perform power exchange for power module to/from battery with stand by assist within 7 days. -MET currently independent 4/22/2022    Initiated 4/8/2022  1. Patient will move from supine to sit and sit to supine , scoot up and down, and roll side to side in bed with supervision/set-up within 7 days. 2.  Patient will perform sit to/from stand with supervision/set-up within 7 days. 3.  Patient will ambulate 50 feet with least restrictive assistive device and minimal assistance/contact guard assist within 7 days. 4.  Patient will ascend/descend 6 stairs with handrail(s) with minimal assistance/contact guard assist within 14 days. 5.  Patient will perform cardiac exercises per protocol with supervision/set-up within 7 days. 6.  Patient will verbally and functionally recall 3/3 sternal precautions within 7 days.   7. Patient will perform power exchange for power module to/from battery with minimal assistance within 7 days. Outcome: Progressing Towards Goal  PHYSICAL THERAPY TREATMENT  Patient: Bree Dill (51 y.o. male)  Date: 4/26/2022  Diagnosis: HFrEF (heart failure with reduced ejection fraction) ([facility]) [I50.20] <principal problem not specified>  Procedure(s) (LRB):  REDO STERNOTOMY; RIGHT GROIN CUTDOWN FOR CANNULATION;  INSERTION OF LEFT VENTRICULAR ASSIST DEVICE (HMIII); AORTIC VALVE CLOSURE CHEL BY DR. Lorenza Davila. (N/A) 21 Days Post-Op  Precautions: Fall (mindful movement, LVAD)  Chart, physical therapy assessment, plan of care and goals were reviewed. ASSESSMENT  Patient continues with skilled PT services and is progressing towards goals. Patient received seated in chair, on battery power and agreeable to therapy. Donned compression socks and shoes, requiring rest break after lower body ADLs due to dyspnea. Able to ambulate throughout unit with 2 mild path deviations which he self corrected. Reviewed low battery scenario and he completed with min verbal cues. Returned to chair and wife present. Current Level of Function Impacting Discharge (mobility/balance): CGA    Other factors to consider for discharge: BRAND, able to recover with rest, less pain today         PLAN :  Patient continues to benefit from skilled intervention to address the above impairments. Continue treatment per established plan of care. to address goals. Recommendation for discharge: (in order for the patient to meet his/her long term goals)  Physical therapy at least 2 days/week in the home     This discharge recommendation:  Has been made in collaboration with the attending provider and/or case management    IF patient discharges home will need the following DME: to be determined (TBD)       SUBJECTIVE:   Patient stated I walk better in my shoes.     OBJECTIVE DATA SUMMARY:       Critical Behavior:  Neurologic State: Alert  Orientation Level: Oriented X4  Cognition: Appropriate decision making,Appropriate for age attention/concentration,Appropriate safety awareness,Follows commands     Functional Mobility Training:  Bed Mobility:  Rolling: Supervision  Supine to Sit: Supervision  Scooting: Supervision  Transfers:  Sit to Stand: Supervision  Stand to Sit: Supervision  Bed to Chair: Supervision  Balance:  Sitting: Intact  Standing: Impaired; Without support  Standing - Static: Good  Standing - Dynamic : Fair  Ambulation/Gait Training:  Distance (ft): 250 Feet (ft)  Assistive Device: Gait belt  Ambulation - Level of Assistance: Contact guard assistance;Stand-by assistance  Gait Abnormalities: Decreased step clearance; Path deviations  Base of Support: Widened  Speed/Charlene: Pace decreased (<100 feet/min)  Step Length: Right shortened;Left shortened    Pain Rating:  Denied pain    Activity Tolerance:   Good, SpO2 stable on RA, and observed SOB with activity      After treatment patient left in no apparent distress:   Sitting in chair, Call bell within reach, Bed / chair alarm activated, and Caregiver / family present    COMMUNICATION/COLLABORATION:   The patients plan of care was discussed with: Occupational therapist and Registered nurse.      Jose Meng PT, DPT   Time Calculation: 31 mins

## 2022-04-26 NOTE — PROGRESS NOTES
[facility] in [place] [address] Education:     Met with patient and family, Ana Rosa Mckeon, for LVAD education. Reviewed the following:      Exchange- Reviewed  exchange and reason for exchanging controller. Educated controller exchange should only be done under direction of the LVAD team, and in the event of an emergent controller exchange LVAD team member will be walking caregiver through each step via phone. Demonstrated  exchange using HM mock loop. Return demonstration completed by Ana Rosa Mckeon. Received, inventoried and delivered to patient all LVAD discharge equipment. Discussed with patient UBC, MPU, 4 14 volt Li-Ion batteries, holster vest, travel/emergency bag, and consolidated bag. Patient verbalized all items to place in travel/emergency bag. Assisted patient with packing travel/emergency bag.         Orville Burt, KIRK  VAD Coordinator

## 2022-04-26 NOTE — PROGRESS NOTES
0730: Bedside shift change report given to Lotus Gee and Eliazar Pérez RNs (oncoming nurse) by Kamryn Gutierrez (offgoing nurse). Report included the following information SBAR, MAR and Recent Results. 1600: Orthostatics  Supine MAP 90  Sitting MAP 88   Standing MAP 84     1930: Bedside shift change report given to Becky Lemus and Ashleigh, RNs (oncoming nurse) by Kamryn Llanos (offgoing nurse). Report included the following information SBAR, MAR and Recent Results.

## 2022-04-26 NOTE — PROGRESS NOTES
[facility] in [place], [place]  Inpatient Progress Note    Patient name: Emile Olszewski  Patient : 1945  Patient MRN: [id number]  Consulting MD: Brian Castelan MD  Date of service: 22    REASON FOR REFERRAL:  Management of LVAD    PLAN OF CARE:  · 69 y.o. male with severe ischemic cardiomyopathy, LVEF 15-20% s/p HM3 LVAD implant as DT on 22 by Dr. Anthony Brown c/b intraoperative bleeding requiring multiple blood products and subsequent RV failure with severe TR on high dose dobutamine IV and sternal wound infection on antibiotics  · Plan for remainder of hospitalization includes:  · Surveillance weekly echos to determine if RV function improves and TV coapts  · Diuresis and removal of chest tubes when ready  · PT/OT/Nutrition  · Optimization of GDMT     PLAN:  POD 20  TTE  shows mod to severe TR but improved RV function; continue to optimize medically   Continue speed 4800rpms, low speed 4400  Continue dobutamine 6 mcg/kg/min - will need to D/C home as bridge to RV recovery   Continue hydralazine 50 mg PO q8h   Continue  amiodarone 200 mg PO twice daily, decrease to daily dosing x 2 weeks on    I(ncrease losartan to 25 mg PO BID; if tolerates, will transition to Entresto   Cont Eplerenone 25mg daily  Reduce Bumex to 2 mg PO BID    Completed course of cefepime for sternal incision   Driveline dressing changes daily for now until 5/3/22  Needs EKG weekly on   Continue warfarin, goal INR 2-3; 4 mg tonight   ASA 81mg daily  Continue colchicine for pericarditis   Continue gabapentin 200 mg PO TID   IV fentanyl prn (pt with hx anaphylaxis to oxycodone)  PRN toradol  Increased bowel regimen   Afrin nasal spray PRN   Pulmonary toilet  Monitor CT output; to remain in place today per Dr. Anthony Brown - on water seal   CPAP QHS  Advanced care plan forms on file   Strict I/O, daily weights, Na+ restricted diet   Continue VAD education   Equipment ordered, will set goal for home this week   Need to schedule therapy outing       LAST 24 HOURS:  Acute abdominal pain has subsided   mL out   VSS and LVAD flows stable- no acute alarms on VAD  Creatinine stable 0.94  T Bili 0.7   PBNP 2791    REVIEW OF SYSTEMS:  Review of Systems   Constitutional: Negative for chills, fever, malaise/fatigue and weight loss. Respiratory: Negative for cough and shortness of breath. Cardiovascular: Negative for chest pain, palpitations, orthopnea and leg swelling. Gastrointestinal: Negative for abdominal pain, nausea and vomiting. Musculoskeletal:        Post-op pain    Neurological: Negative for dizziness and weakness. Psychiatric/Behavioral: Negative. LIFE GOALS:  Patient's personal goals include: getting stronger and going home soon  Important upcoming milestones or family events: The patient identifies the following as important for living well: being independent, being at home, enjoying family time        OBJECTIVE:  PHYSICAL EXAM:  Visit Vitals  /88   Pulse 78   Temp 98.2 °F (36.8 °C)   Resp 16   Ht 6' 1\" (1.854 m)   Wt 180 lb 12.4 oz (82 kg)   SpO2 95%   BMI 23.85 kg/m²       LVAD INTERROGATION:  Device interrogated in person  Device function normal, normal flow, no events  LVAD   Pump Speed (RPM): 4800  Pump Flow (LPM): 3.1  MAP: 92  PI (Pulsitility Index): 8.4  Power: 3.1   Test: No  Back Up  at Bedside & Labeled: Yes  Power Module Test: No  Driveline Site Care: No  Driveline Dressing: Clean, Dry, and Intact  MAP in Therapeutic Range (Outpatient): Yes  Testing  Alarms Reviewed: Yes  Back up SC speed: 4800  Back up Low Speed Limit: 4400  Emergency Equipment with Patient?: Yes  Emergency procedures reviewed?: Yes  Drive line site inspected?: Yes  Drive line intergrity inspected?: Yes  Drive line dressing changed?: No      Physical Exam  Vitals reviewed. Constitutional:       General: He is not in acute distress. Appearance: Normal appearance. Cardiovascular:      Rate and Rhythm: Normal rate and regular rhythm. Comments: LVAD Hum noted on auscultation  Pulmonary:      Effort: Pulmonary effort is normal. No respiratory distress. Abdominal:      General: Bowel sounds are normal. There is no distension. Palpations: Abdomen is soft. Tenderness: There is no abdominal tenderness. Musculoskeletal:      Right lower leg: No edema. Left lower leg: No edema. Skin:     General: Skin is warm and dry. Capillary Refill: Capillary refill takes less than 2 seconds. Neurological:      General: No focal deficit present. Mental Status: He is alert and oriented to person, place, and time. Psychiatric:         Mood and Affect: Mood normal.         Behavior: Behavior normal.         Thought Content: Thought content normal.         Judgment: Judgment normal.            CARDIAC IMAGING AND DIAGNOSTICS REVIEWED:  Echo 4/18/22- LVEF 15-20%, severe TR, TAPSE 0.5cm, RVIDd 6cm   Echo (4/8/22)    Left Ventricle: Left ventricle size is normal. Moderately increased wall thickness. Findings consistent with concentric remodeling. Severe global hypokinesis present. Severely reduced left ventricular systolic function with a visually estimated EF of 10 -15%. 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   Aortic Valve: Moderate sclerosis of the aortic valve cusp.   Mitral Valve: Moderately thickened leaflet. Mild transvalvular regurgitation.   Tricuspid Valve: Severe transvalvular regurgitation.   Right Atrium: Right atrium is severely dilated. Echo (3/2/22)   · LV 10-15%  · Mod-severe MR      Echo (11/23/21):  · LV 15-20%. · Mod-severe, MR, mod-TR     Echo (5/20/21)  · LV: Estimated LVEF is 20 - 25%. Normal wall thickness. Mildly dilated left ventricle. Severely and globally reduced systolic function.  Severe (grade 4) left ventricular diastolic dysfunction. · LA: Severely dilated left atrium. · RA: Severely dilated right atrium. · MV: Moderate mitral valve regurgitation is present. · TV: Moderate tricuspid valve regurgitation is present. · AO: Mild aortic root dilatation. · RV: Pacer/ICD present. · LVED 6.2cm     EKG (5/20/21) SB with 1degree AV block, QRS 116ms     LHC (5/24/21) Native Coronaries: LM - moderate to severe distal disease 50%. % prox occluded (), heavily calcified, LCx: 80% proximal stenosis. OM1 is  (seen filling faintly via collaterals). OM2 99% stenosis. RCA: 100% proximal occluded. LIMA to LAD: patent, supplies only a diagonal branch (probably D2). The LAD distal to the bifurcation is 100% occluded () and fills via right to left collaterals off the SVG to RCA. SVG to R-PDA: patent with moderate diffuse irregularities but no obstructive disease in the graft. No appealing interventional targets. NST as above     ICD Interrogation (11/12/2021) [employer] VVI, thoracic impedence trending up, no events, normal device function and good battery  ICD interrogation (5/12/21) [employer] single lead, no events, normal device function and good battery     HEMODYNAMICS:  RHC 5/24/21 CI 1.97, PA 33/9/17, RA 1, PCWP 6- off milrinone   CPEST not done     Patient underwent a 6 minute walk test 11/12/2021     6 Min Walk Report 11/12/2021 7/6/2021 5/20/2021   (PRE) HR 88 98 74   (PRE) O2 Sat 100 - 99   (POST)  - 81   (POST) O2 Sat 99 98% on Ra 99   Distance in Meters 386.58 - 1158.24       OTHER IMAGING:  CXR (6/17/21) clear  CT 5/21/21 1. Irregular pulmonary nodule in the left upper lobe measuring 2 cm, suspicious for primary pulmonary malignancy. 2. Additional 6 mm left upper lobe pulmonary nodule. 3. Severe calcific atherosclerosis of the coronary arteries and abdominal aorta. No aneurysm. 4. Cardiomegaly. 5. No acute abnormality within the chest, abdomen, or pelvis.       LABORATORY RESULTS:     Labs Latest Ref Rng & Units 4/26/2022 4/25/2022 4/25/2022 4/25/2022 4/24/2022 4/23/2022 4/22/2022   WBC 4.1 - 11.1 K/uL 4.7 6.5 5.4 5.0 6.1 6.8 6.9   RBC 4.10 - 5.70 M/uL 2.96(L) 3.29(L) 2.96(L) 2.61(L) 2.82(L) 2.96(L) 2.89(L)   Hemoglobin 12.1 - 17.0 g/dL 7. 8(L) 8.6(L) 7. 6(L) 6. 8(L) 7. 5(L) 7. 7(L) 7. 5(L)   Hematocrit 36.6 - 50.3 % 24. 8(L) 27. 6(L) 24. 5(L) 22. 0(L) 23. 9(L) 24. 3(L) 24. 1(L)   MCV 80.0 - 99.0 FL 83.8 83.9 82.8 84.3 84.8 82.1 83.4   Platelets 200 - 660 K/uL 259 269 249 222 248 270 274   Lymphocytes 12 - 49 % - - - - - - -   Monocytes 5 - 13 % - - - - - - -   Eosinophils 0 - 7 % - - - - - - -   Basophils 0 - 1 % - - - - - - -   Bands 0 - 6 % - - - - - - -   Albumin 3.5 - 5.0 g/dL 2. 7(L) 3.0(L) - 2. 7(L) 2. 6(L) 2. 8(L) 2. 8(L)   Calcium 8.5 - 10.1 MG/DL 8. 2(L) 8.0(L) - 7. 6(L) 8.0(L) 8.4(L) 8.4(L)   Glucose 65 - 100 mg/dL 109(H) 157(H) - 103(H) 105(H) 87 114(H)   BUN 6 - 20 MG/DL 23(H) 21(H) - 19 22(H) 23(H) 24(H)   Creatinine 0.70 - 1.30 MG/DL 0.94 0.90 - 0.84 0.83 0.94 0.90   Sodium 136 - 145 mmol/L 133(L) 132(L) - 133(L) 134(L) 133(L) 132(L)   Potassium 3.5 - 5.1 mmol/L 4.0 4.1 - 3.8 4.3 4.0 4.2   TSH 0.36 - 3.74 uIU/mL - - - - - - -   LDH 85 - 241 U/L 190 - - 169 218 199 216   Some recent data might be hidden     Lab Results   Component Value Date/Time    TSH 2.26 04/10/2022 03:00 AM    TSH 1.68 03/01/2022 11:50 AM    TSH 1.47 05/26/2021 10:44 PM    TSH 1.97 05/20/2021 04:28 PM    TSH 1.41 02/09/2021 03:05 PM    TSH 1.740 08/14/2018 09:58 AM    TSH 1.710 10/18/2017 12:00 AM         HISTORY:  PAST MEDICAL HISTORY:  Past Medical History:   Diagnosis Date    CAD (coronary artery disease) '90 '97, '13 x 2    MI, code ice in 2013 at [facility]    Calculus of kidney     Colonic polyps     Congestive heart failure, unspecified     Diabetes ([facility] [address]     Gastritis     Hypercholesterolemia     Sleep apnea        PAST SURGICAL HISTORY:  Past Surgical History:   Procedure Laterality Date    COLONOSCOPY  04/06/2011    16, due 21    COLONOSCOPY N/A 3/2/2022    COLONOSCOPY    :- performed by [place] Bra[place] MD at [facility] ENDOSCOPY    ENDOSCOPY, COLON, DIAGNOSTIC      11, due 16    HX CORONARY ARTERY BYPASS GRAFT  8/22/12    x 4 vessel by S. James    HX HEENT      LASIK    HX PACEMAKER PLACEMENT  1/30/13    OH CARDIAC SURG PROCEDURE UNLIST  2012    x 4 vessels       FAMILY HISTORY:  Family History   Problem Relation Age of Onset    Heart Disease Mother         MI    Heart Disease Father         CAD & PVD    Lung Disease Father     Cancer Father         lung    Diabetes Maternal Grandmother     Heart Disease Other         CAD    Stroke Sister        SOCIAL HISTORY:  Social History     Socioeconomic History    Marital status:    Tobacco Use    Smoking status: Never Smoker    Smokeless tobacco: Never Used   Vaping Use    Vaping Use: Never used   Substance and Sexual Activity    Alcohol use:  Yes     Alcohol/week: 0.0 standard drinks     Comment:  VERY RARE    Drug use: No       ALLERGY:  Allergies   Allergen Reactions    Oxycodone Anaphylaxis    Pcn [Penicillins] Hives        CURRENT MEDICATIONS:    Current Facility-Administered Medications:     bumetanide (BUMEX) tablet 2 mg, 2 mg, Oral, BID, Chris Wong NP    losartan (COZAAR) tablet 25 mg, 25 mg, Oral, BID, PolliarChris reynoso NP    warfarin (COUMADIN) tablet 4 mg, 4 mg, Oral, ONCE, Chris Wong NP    DOBUTamine (DOBUTREX) 1,000 mg/250 mL (4,000 mcg/mL) infusion, 6 mcg/kg/min, IntraVENous, CONTINUOUS, Yarelis Wong NP, Last Rate: 7.5 mL/hr at 04/26/22 0912, 6 mcg/kg/min at 04/26/22 0912    ketorolac (TORADOL) injection 15 mg, 15 mg, IntraVENous, Q6H PRN, PollMillie soliman TEARNESTINE, 15 mg at 04/26/22 0940    sodium chloride (OCEAN) 0.65 % nasal squeeze bottle 2 Spray, 2 Spray, Both Nostrils, Q2H PRN, Terry, Danica B, NP    docusate sodium (COLACE) capsule 100 mg, 100 mg, Oral, DAILY, Terry, Danica B, NP, 100 mg at 04/26/22 0920    eplerenone (INSPRA) tablet 25 mg, 25 mg, Oral, DAILY, Terry, Danica B, NP, 25 mg at 04/26/22 0919    potassium chloride SR (KLOR-CON 10) tablet 20 mEq, 20 mEq, Oral, DAILY, Terry, Danica B, NP, 20 mEq at 04/26/22 0920    guaiFENesin (ROBITUSSIN) 100 mg/5 mL oral liquid 100 mg, 100 mg, Oral, Q4H PRN, Glenn Francisco MD, 100 mg at 04/22/22 2314    polyethylene glycol (MIRALAX) packet 17 g, 17 g, Oral, DAILY, Geni Danielle B, NP, 17 g at 04/26/22 0920    senna-docusate (PERICOLACE) 8.6-50 mg per tablet 1 Tablet, 1 Tablet, Oral, DAILY PRN, Geni Danielle B, NP, 1 Tablet at 04/24/22 0913    oxymetazoline (AFRIN) 0.05 % nasal spray 2 Spray, 2 Spray, Both Nostrils, BID PRN, Geni Danielle B, NP, 2 Martelle at 04/24/22 1612    hydrALAZINE (APRESOLINE) tablet 50 mg, 50 mg, Oral, Q8H, Millie Wong NP, 50 mg at 04/26/22 1247    amiodarone (CORDARONE) tablet 200 mg, 200 mg, Oral, BID, Polliarangeles, Duke Moralesice T, NP, 200 mg at 04/26/22 0920    colchicine tablet 0.6 mg, 0.6 mg, Oral, DAILY, Duke Wong Angelica T, NP, 0.6 mg at 04/26/22 0919    alteplase (CATHFLO) 1 mg in sterile water (preservative free) 1 mL injection, 1 mg, InterCATHeter, PRN, Marcela Beck MD, 1 mg at 04/25/22 2136    bisacodyL (DULCOLAX) suppository 10 mg, 10 mg, Rectal, DAILY PRN, Daria Reese MD, 10 mg at 04/12/22 1533    gabapentin (NEURONTIN) capsule 200 mg, 200 mg, Oral, TID, Polliard, Duke Angelica T, NP, 200 mg at 04/26/22 0919    albumin human 25% (BUMINATE) solution 12.5 g, 12.5 g, IntraVENous, BID, Polliard, Duke MONTERO, NP, 12.5 g at 04/26/22 0913    famotidine (PEPCID) tablet 20 mg, 20 mg, Oral, Q12H, Daria Reese MD, 20 mg at 04/26/22 0920    mirtazapine (REMERON) tablet 7.5 mg, 7.5 mg, Oral, QHS, Danica Stewart, NP, 7.5 mg at 04/25/22 2132    lidocaine 4 % patch 1 Patch, 1 Patch, TransDERmal, Q24H, Danica Stewart, NP, 1 Patch at 04/26/22 1247    Warfarin NP/MD dosing, , Other, PRN, Danica Stewart B, NP    melatonin tablet 3 mg, 3 mg, Oral, QHS PRN, Slime Wing, Breann Lu MD, 3 mg at 04/20/22 2127    hydrALAZINE (APRESOLINE) 20 mg/mL injection 10 mg, 10 mg, IntraVENous, Q6H PRN, Genella Nose B, NP, 10 mg at 04/24/22 1415    aspirin chewable tablet 81 mg, 81 mg, Oral, DAILY, Genella Nose B, NP, 81 mg at 04/26/22 0919    hydrALAZINE (APRESOLINE) 20 mg/mL injection 5 mg, 5 mg, IntraVENous, Q6H PRN, Genella Nose B, NP, 5 mg at 04/13/22 1746    sodium chloride (NS) flush 5-40 mL, 5-40 mL, IntraVENous, Q8H, Polliard, Millie T, NP, 10 mL at 04/26/22 0752    sodium chloride (NS) flush 5-40 mL, 5-40 mL, IntraVENous, PRN, Polliard, Polo Gerda T, NP, 10 mL at 04/24/22 0914    0.9% sodium chloride infusion, 9 mL/hr, IntraVENous, CONTINUOUS, Polliard, Polo Gerda T, NP, Last Rate: 3 mL/hr at 04/20/22 0513, 3 mL/hr at 04/20/22 0513    acetaminophen (TYLENOL) tablet 650 mg, 650 mg, Oral, Q6H PRN, Polliard, Jackolyn Glee, NP, 650 mg at 04/25/22 1047    naloxone (NARCAN) injection 0.4 mg, 0.4 mg, IntraVENous, PRN, Polliard, Jackolyn Glee, NP    ondansetron [facility]) injection 4 mg, 4 mg, IntraVENous, Q4H PRN, Polliard, Polo Gerda T, NP, 4 mg at 04/11/22 2139    albuterol-ipratropium (DUO-NEB) 2.5 MG-0.5 MG/3 ML, 3 mL, Nebulization, Q6H PRN, Polliard, Jackolyn Glee, NP    glucose chewable tablet 16 g, 4 Tablet, Oral, PRN, Polliard, Jackolyn Glee, NP    glucagon (GLUCAGEN) injection 1 mg, 1 mg, IntraMUSCular, PRN, Alexandre Wong NP    insulin lispro (HUMALOG) injection, , SubCUTAneous, AC&HS, Alexandre Wong NP, 2 Units at 04/24/22 1310    sucralfate (CARAFATE) tablet 1 g, 1 g, Oral, AC&HS, Zeyad Wong NP, 1 g at 04/26/22 1247    0.45% sodium chloride infusion, 10 mL/hr, IntraVENous, CONTINUOUS, Rachell Najera MD, Stopped at 04/10/22 0730    fentaNYL citrate (PF) injection 25 mcg, 25 mcg, IntraVENous, Q2H PRN, Alexandre Wong NP, 25 mcg at 04/25/22 1251    PATIENT CARE TEAM:  Patient Care Team:  Frank Erickson MD as PCP - General (Internal Medicine)  Radha Belle MD as PCP - [facility] Empaneled Provider  Nati Cowart MD as Physician (Cardiology)  Andrea Hernandez MD as Physician (Gastroenterology)  Alex Fuller MD as Physician (Orthopedic Surgery)  Daniel Bazzi MD as Physician (Ophthalmology)  Kentrell Pereyra MD (Cardiology)  Daysi Amato MD (Cardiology)       Thank you for allowing us to participate in this patient's care.       Dominique Jean NP   [address] [place] [address], [address]  Phone: [phone]

## 2022-04-26 NOTE — PROGRESS NOTES
0800: Preceptor Review of RN  Work    4/26/2022  - Shift times - 1030 to 0800    The RN documentation of patient care for [address] [place] [address] has been reviewed and approved. All medications have been administered under the direct supervision of the preceptor.     Jakub Brumfield RN

## 2022-04-26 NOTE — PROGRESS NOTES
Bedside shift change report given to Natasha (oncoming nurse) by Nora Matthews (offgoing nurse). Report included the following information SBAR, Kardex, ED Summary, Intake/Output, MAR, and Cardiac Rhythm SR . Problem: Falls - Risk of  Goal: *Absence of Falls  Description: Document Hermelindo Sites Fall Risk and appropriate interventions in the flowsheet. Outcome: Progressing Towards Goal  Note: Fall Risk Interventions:  Mobility Interventions: Patient to call before getting OOB,Bed/chair exit alarm      Medication Interventions: Patient to call before getting OOB,Bed/chair exit alarm    Elimination Interventions: Bed/chair exit alarm,Call light in reach,Urinal in reach         Problem: Diabetes Self-Management  Goal: *Disease process and treatment process  Description: Define diabetes and identify own type of diabetes; list 3 options for treating diabetes. Outcome: Progressing Towards Goal  Goal: *Incorporating nutritional management into lifestyle  Description: Describe effect of type, amount and timing of food on blood glucose; list 3 methods for planning meals. Outcome: Progressing Towards Goal  Goal: *Incorporating physical activity into lifestyle  Description: State effect of exercise on blood glucose levels. Outcome: Progressing Towards Goal  Goal: *Developing strategies to promote health/change behavior  Description: Define the ABC's of diabetes; identify appropriate screenings, schedule and personal plan for screenings. Outcome: Progressing Towards Goal  Goal: *Using medications safely  Description: State effect of diabetes medications on diabetes; name diabetes medication taking, action and side effects. Outcome: Progressing Towards Goal  Goal: *Monitoring blood glucose, interpreting and using results  Description: Identify recommended blood glucose targets  and personal targets.   Outcome: Progressing Towards Goal     Problem: Pressure Injury - Risk of  Goal: *Prevention of pressure injury  Description: Document Jl Scale and appropriate interventions in the flowsheet.   Outcome: Progressing Towards Goal  Note: Pressure Injury Interventions:  Sensory Interventions: Avoid rigorous massage over bony prominences,Keep linens dry and wrinkle-free,Maintain/enhance activity level,Minimize linen layers    Moisture Interventions: Absorbent underpads,Minimize layers    Activity Interventions: Pressure redistribution bed/mattress(bed type),Increase time out of bed    Mobility Interventions: Pressure redistribution bed/mattress (bed type)    Nutrition Interventions: Document food/fluid/supplement intake,Offer support with meals,snacks and hydration    Friction and Shear Interventions: Lift sheet,HOB 30 degrees or less,Apply protective barrier, creams and emollients    Problem: Heart Failure: Discharge Outcomes  Goal: *Verbalizes understanding/describes prescribed medications  Outcome: Progressing Towards Goal

## 2022-04-26 NOTE — PROGRESS NOTES
Problem: Self Care Deficits Care Plan (Adult)  Goal: *Acute Goals and Plan of Care (Insert Text)  Description: FUNCTIONAL STATUS PRIOR TO ADMISSION: pt lives with wife, independent prior    HOME SUPPORT: The patient lived with wife but did not require assist.    Occupational Therapy Goals  Goals reviewed and upgraded as follows 4/25/2022  1. Patient will perform ADLs standing 5 mins without fatigue or LOB with modified independence within 7 day(s). upgraded  2. Patient will perform lower body ADLs with modified independence using legs crossed method within 7 day(s). upgraded  3. Patient will perform upper body ADLs with modified independence within 7 day(s). upgraded  4. Patient will perform toilet transfers with modified independence within 7 day(s). upgraded  5. Patient will perform all aspects of toileting with modified independence within 7 day(s). upgraded  6. Patient will participate in cardiac/sternal upper extremity therapeutic exercise/activities to increase independence with ADLs with supervision/set-up for 5 minutes within 7 day(s). continue  7. Patient will perform VAD switchover routine as part of ADL routine (including batteries<>batteries, battery clip<>battery clip, mock SC<>SC) with stand by/set-up assistance within 7 days. upgraded     Goals reviewed and remain appropriate 4/18/2022  Initiated 4/8/2022  1. Patient will perform ADLs standing 5 mins without fatigue or LOB with supervision/set-up within 7 day(s). 2.  Patient will perform lower body ADLs with minimal assistance/contact guard assist within 7 day(s). 3.  Patient will perform upper body ADLs with minimal assistance/contact guard assist within 7 day(s). 4.  Patient will perform toilet transfers with minimal assistance/contact guard assist within 7 day(s). 5.  Patient will perform all aspects of toileting with minimal assistance/contact guard assist within 7 day(s).   6.  Patient will participate in cardiac/sternal upper extremity therapeutic exercise/activities to increase independence with ADLs with supervision/set-up for 5 minutes within 7 day(s). 7.  Patient will perform VAD switchover routine as part of ADL routine (including batteries<>batteries, battery clip<>battery clip, mock SC<>SC) with minimal assistance/contact guard assist within 7 days. Outcome: Progressing Towards Goal   OCCUPATIONAL THERAPY TREATMENT  Patient: Aimee Mackenzie (89 y.o. male)  Date: 4/26/2022  Diagnosis: HFrEF (heart failure with reduced ejection fraction) ([facility]) [I50.20] <principal problem not specified>  Procedure(s) (LRB):  REDO STERNOTOMY; RIGHT GROIN CUTDOWN FOR CANNULATION;  INSERTION OF LEFT VENTRICULAR ASSIST DEVICE (HMIII); AORTIC VALVE CLOSURE CHEL BY DR. Amy Kellogg. (N/A) 21 Days Post-Op  Precautions: Fall (mindful movement, LVAD)  Chart, occupational therapy assessment, plan of care, and goals were reviewed. ASSESSMENT  Patient continues with skilled OT services and is progressing towards goals. Patient received semi supine in bed and agreeable to working with therapy. Patient presents with intact standing balance, requiring two attempts for sit -> stand from low surface, cueing to maintain move in tube precautions, decreased generalized strength, and impaired activity tolerance. Discussed community outing with patient and scheduling closer to discharge date when discharge date is known, patient verbalized understanding. Patient transferred to edge of bed and then transferring to recliner. Patient completed post op cardiac exercises in sitting and standing, see below for details. Patient then ambulating into bathroom and stood ~5 minutes at sink with supervision for grooming to wash face and brush teeth. Patient attempting to stand with left arm above shoulder height propped on wall, requiring verbal cueing to maintain move in tube and avoid raising arms unilaterally above shoulder height, verbalized understanding.  Patient verbalized having shower chair at home, educated on energy conservation during showering and need to have shower chair close by during first shower as needed. Patient returned to recliner in room at end of session and remained on power module throughout. Patient would benefit from skilled OT services during admission to improve independence with self care and functional mobility/transfers. Recommend discharge to [facility] at this time. Patient is verbalizing and is not demonstrating understanding of mindful-based movements (\"move in the tube\") principles of keeping UEs proximal to ribcage to prevent lateral pull on the sternum during load-bearing activities with visual and verbal cues required for compliance. Current Level of Function Impacting Discharge (ADLs): supervision for mobility/transfers, supervision grooming at sink    Other factors to consider for discharge: good family support, prior level of function, cueing to maintain move in tube, chest tube remains in place         PLAN :  Patient continues to benefit from skilled intervention to address the above impairments. Continue treatment per established plan of care to address goals. Recommend with staff: up to chair 3x/day for meals, functional mobility to and from bathroom for toileting, grooming at sink as able    Recommend next OT session: continue POC    Recommendation for discharge: (in order for the patient to meet his/her long term goals)  Occupational therapy at least 2 days/week in the home AND ensure assist and/or supervision for safety with LVAD maintenance    This discharge recommendation:  Has been made in collaboration with the attending provider and/or case management    IF patient discharges home will need the following DME: patient owns DME required for discharge       SUBJECTIVE:   Patient stated I haven't been up yet today.     OBJECTIVE DATA SUMMARY:   Cognitive/Behavioral Status:  Neurologic State: Alert Functional Mobility and Transfers for ADLs:  Bed Mobility:  Rolling: Supervision  Supine to Sit: Supervision  Scooting: Supervision    Transfers:  Sit to Stand: Supervision  Functional Transfers  Bathroom Mobility: Supervision/set up  Bed to Chair: Supervision    Balance:  Sitting: Intact  Standing: Intact; Without support    ADL Intervention:       Grooming  Position Performed: Standing  Washing Face: Supervision  Brushing Teeth: Supervision                                  Patient instructed and educated on mindful movement principles based on Move in The [place] concept to include maintaining bilateral elbows close to rib cage when performing any load-bearing activity such as getting in/out of bed, pushing up from a chair, opening a door, or lifting a box. Patient was given a handout with diagrams of each correct/incorrect method of performing each of the above tasks. Patient instructed on the ability to utilize upper extremities outside the tube when doing any non-load bearing activity such as washing hair/body, brushing teeth, retrieving clothing items, or scratching your back. Patient encouraged to also perform upper extremity exercises \"outside of the tube\" to prevent scar tissue formation around sternal incision site. Patient instructed in detail about activities to heed with caution, allowing pain to be the guide. These activities include but are not limited to: mowing the lawn, riding a bike, walking a dog, lifting a child, workshop hobbies, golfing, sexual activity, vacuuming, fishing, scrubbing the floors, and moving furniture. Patient was given the [address] in the [place] handout to describe each of these activities in detail. Patient instructed no asymmetrical reaching over head to ensure B UEs when shoulders >90* i.e. reaching in cabinets and dressing. Instruction on upper body dressing techniques of over head, then arms through to decrease pain and unilateral shoulder flexion >90*. Instruction on the benefits of utilizing B UEs during functional tasks i.e. opening the fridge, stepping into the tub. May have to adjust home setup to increase ease with items closer to waist height to prevent deep bending and the automatic  of asymmetrical UE WB/pushing for stabilization during bending. Benefit to don clothing tailor sitting and don all clothing while sitting prior to standing. Instruction and indicated understanding on the benefits of loose clothing throughout to accommodate for post surgical swelling, decreased ROM and increased pain. Instruction and indicated understanding the technique of pull over shirt versus front open clothing. Increase activity tolerance for home, work, and sexual intercourse by pacing self with increasing the arm exercises, sitting duration, frequency OOB, walking, standing, and ADLs. Instructed and indicated understanding of s/s of too much activity, how to respond to s/s safely. Therapeutic Exercises:   Patient instructed on the benefits and demonstrated cardiac exercises while sitting and standing with Supervision. Instructed and indicated understanding on how to progress reps, sets against gravity, pacing through progressive muscle strengthening standing based on surgeon clearance for more weight in prep for basic and instrumental ADLs. Instruction on the use of household items in place of weights as needed.     CARDIAC   EXERCISE    Sets    Reps    Active  Active Assist    Passive  Self ROM    Comments    Shoulder flexion  1  5   [x]                            []                             []                             []                             In sitting   Shoulder abduction  1  5  [x]                             []                             []                             []                             In standing   Scapular elevation  1  5  [x]                             []                              []                             [] In sitting   Scapular retraction  1  5  [x]                             []                             []                             []                             In sitting   Trunk rotation  1  5  [x]                             []                             []                             []                             In sitting   Trunk sidebending  1  5  [x]                             []                              []                             []                                   In standing       Pain:  Minimal complaints of pain throughout    Activity Tolerance:   Good, SpO2 stable on RA and requires rest breaks    After treatment patient left in no apparent distress:   Sitting in chair and Call bell within reach    COMMUNICATION/COLLABORATION:   The patients plan of care was discussed with: Physical therapist.     Adeola Gonzales OTR/L  Time Calculation: 25 mins

## 2022-04-27 ENCOUNTER — DOCUMENTATION ONLY (OUTPATIENT)
Dept: CARDIOLOGY CLINIC | Age: 77
End: 2022-04-27

## 2022-04-27 ENCOUNTER — APPOINTMENT (OUTPATIENT)
Dept: GENERAL RADIOLOGY | Age: 77
DRG: 001 | End: 2022-04-27
Attending: NURSE PRACTITIONER
Payer: MEDICARE

## 2022-04-27 LAB
ALBUMIN SERPL-MCNC: 2.9 G/DL (ref 3.5–5)
ALBUMIN/GLOB SERPL: 1 {RATIO} (ref 1.1–2.2)
ALP SERPL-CCNC: 148 U/L (ref 45–117)
ALT SERPL-CCNC: 26 U/L (ref 12–78)
ANION GAP SERPL CALC-SCNC: 4 MMOL/L (ref 5–15)
AST SERPL-CCNC: 77 U/L (ref 15–37)
BILIRUB SERPL-MCNC: 0.6 MG/DL (ref 0.2–1)
BNP SERPL-MCNC: 3380 PG/ML
BUN SERPL-MCNC: 26 MG/DL (ref 6–20)
BUN/CREAT SERPL: 27 (ref 12–20)
CALCIUM SERPL-MCNC: 8.6 MG/DL (ref 8.5–10.1)
CHLORIDE SERPL-SCNC: 100 MMOL/L (ref 97–108)
CO2 SERPL-SCNC: 31 MMOL/L (ref 21–32)
CREAT SERPL-MCNC: 0.96 MG/DL (ref 0.7–1.3)
ERYTHROCYTE [DISTWIDTH] IN BLOOD BY AUTOMATED COUNT: 20 % (ref 11.5–14.5)
GLOBULIN SER CALC-MCNC: 3 G/DL (ref 2–4)
GLUCOSE BLD STRIP.AUTO-MCNC: 122 MG/DL (ref 65–117)
GLUCOSE BLD STRIP.AUTO-MCNC: 129 MG/DL (ref 65–117)
GLUCOSE BLD STRIP.AUTO-MCNC: 151 MG/DL (ref 65–117)
GLUCOSE BLD STRIP.AUTO-MCNC: 154 MG/DL (ref 65–117)
GLUCOSE SERPL-MCNC: 92 MG/DL (ref 65–100)
HCT VFR BLD AUTO: 25 % (ref 36.6–50.3)
HGB BLD-MCNC: 7.7 G/DL (ref 12.1–17)
INR PPP: 2.9 (ref 0.9–1.1)
LDH SERPL L TO P-CCNC: 169 U/L (ref 85–241)
MAGNESIUM SERPL-MCNC: 2.1 MG/DL (ref 1.6–2.4)
MCH RBC QN AUTO: 25.8 PG (ref 26–34)
MCHC RBC AUTO-ENTMCNC: 30.8 G/DL (ref 30–36.5)
MCV RBC AUTO: 83.6 FL (ref 80–99)
NRBC # BLD: 0 K/UL (ref 0–0.01)
NRBC BLD-RTO: 0 PER 100 WBC
PLATELET # BLD AUTO: 257 K/UL (ref 150–400)
PMV BLD AUTO: 9.6 FL (ref 8.9–12.9)
POTASSIUM SERPL-SCNC: 3.9 MMOL/L (ref 3.5–5.1)
PROT SERPL-MCNC: 5.9 G/DL (ref 6.4–8.2)
PROTHROMBIN TIME: 28.2 SEC (ref 9–11.1)
RBC # BLD AUTO: 2.99 M/UL (ref 4.1–5.7)
SERVICE CMNT-IMP: ABNORMAL
SODIUM SERPL-SCNC: 135 MMOL/L (ref 136–145)
WBC # BLD AUTO: 5 K/UL (ref 4.1–11.1)

## 2022-04-27 PROCEDURE — 74011250637 HC RX REV CODE- 250/637: Performed by: NURSE PRACTITIONER

## 2022-04-27 PROCEDURE — 83880 ASSAY OF NATRIURETIC PEPTIDE: CPT

## 2022-04-27 PROCEDURE — 97535 SELF CARE MNGMENT TRAINING: CPT

## 2022-04-27 PROCEDURE — 83615 LACTATE (LD) (LDH) ENZYME: CPT

## 2022-04-27 PROCEDURE — 74011000250 HC RX REV CODE- 250: Performed by: NURSE PRACTITIONER

## 2022-04-27 PROCEDURE — 80053 COMPREHEN METABOLIC PANEL: CPT

## 2022-04-27 PROCEDURE — 85610 PROTHROMBIN TIME: CPT

## 2022-04-27 PROCEDURE — 74011250637 HC RX REV CODE- 250/637: Performed by: INTERNAL MEDICINE

## 2022-04-27 PROCEDURE — P9047 ALBUMIN (HUMAN), 25%, 50ML: HCPCS | Performed by: NURSE PRACTITIONER

## 2022-04-27 PROCEDURE — 74011250636 HC RX REV CODE- 250/636: Performed by: NURSE PRACTITIONER

## 2022-04-27 PROCEDURE — 36415 COLL VENOUS BLD VENIPUNCTURE: CPT

## 2022-04-27 PROCEDURE — 65660000001 HC RM ICU INTERMED STEPDOWN

## 2022-04-27 PROCEDURE — 82962 GLUCOSE BLOOD TEST: CPT

## 2022-04-27 PROCEDURE — 85027 COMPLETE CBC AUTOMATED: CPT

## 2022-04-27 PROCEDURE — 77030037442 HC TY LVAD MGMT SYST CMP-B

## 2022-04-27 PROCEDURE — 93750 INTERROGATION VAD IN PERSON: CPT | Performed by: NURSE PRACTITIONER

## 2022-04-27 PROCEDURE — 83735 ASSAY OF MAGNESIUM: CPT

## 2022-04-27 PROCEDURE — 99232 SBSQ HOSP IP/OBS MODERATE 35: CPT | Performed by: NURSE PRACTITIONER

## 2022-04-27 PROCEDURE — 71045 X-RAY EXAM CHEST 1 VIEW: CPT

## 2022-04-27 RX ORDER — WARFARIN 4 MG/1
4 TABLET ORAL ONCE
Status: COMPLETED | OUTPATIENT
Start: 2022-04-27 | End: 2022-04-27

## 2022-04-27 RX ADMIN — DOCUSATE SODIUM 100 MG: 100 CAPSULE, LIQUID FILLED ORAL at 09:08

## 2022-04-27 RX ADMIN — ALBUMIN (HUMAN) 12.5 G: 0.25 INJECTION, SOLUTION INTRAVENOUS at 09:07

## 2022-04-27 RX ADMIN — HYDRALAZINE HYDROCHLORIDE 50 MG: 50 TABLET, FILM COATED ORAL at 21:06

## 2022-04-27 RX ADMIN — WARFARIN SODIUM 4 MG: 4 TABLET ORAL at 18:01

## 2022-04-27 RX ADMIN — SODIUM CHLORIDE, PRESERVATIVE FREE 10 ML: 5 INJECTION INTRAVENOUS at 06:50

## 2022-04-27 RX ADMIN — MIRTAZAPINE 7.5 MG: 15 TABLET, FILM COATED ORAL at 21:06

## 2022-04-27 RX ADMIN — BUMETANIDE 2 MG: 1 TABLET ORAL at 15:37

## 2022-04-27 RX ADMIN — KETOROLAC TROMETHAMINE 15 MG: 30 INJECTION, SOLUTION INTRAMUSCULAR; INTRAVENOUS at 15:37

## 2022-04-27 RX ADMIN — SODIUM CHLORIDE, PRESERVATIVE FREE 10 ML: 5 INJECTION INTRAVENOUS at 21:19

## 2022-04-27 RX ADMIN — GABAPENTIN 200 MG: 100 CAPSULE ORAL at 15:37

## 2022-04-27 RX ADMIN — HYDRALAZINE HYDROCHLORIDE 50 MG: 50 TABLET, FILM COATED ORAL at 12:01

## 2022-04-27 RX ADMIN — COLCHICINE 0.6 MG: 0.6 TABLET, FILM COATED ORAL at 09:08

## 2022-04-27 RX ADMIN — LOSARTAN POTASSIUM 25 MG: 25 TABLET, FILM COATED ORAL at 09:08

## 2022-04-27 RX ADMIN — GABAPENTIN 200 MG: 100 CAPSULE ORAL at 09:08

## 2022-04-27 RX ADMIN — GABAPENTIN 200 MG: 100 CAPSULE ORAL at 21:06

## 2022-04-27 RX ADMIN — AMIODARONE HYDROCHLORIDE 200 MG: 200 TABLET ORAL at 09:08

## 2022-04-27 RX ADMIN — SUCRALFATE 1 G: 1 TABLET ORAL at 06:50

## 2022-04-27 RX ADMIN — SUCRALFATE 1 G: 1 TABLET ORAL at 18:01

## 2022-04-27 RX ADMIN — EPLERENONE 25 MG: 25 TABLET, FILM COATED ORAL at 09:08

## 2022-04-27 RX ADMIN — ALBUMIN (HUMAN) 12.5 G: 0.25 INJECTION, SOLUTION INTRAVENOUS at 18:01

## 2022-04-27 RX ADMIN — HYDRALAZINE HYDROCHLORIDE 50 MG: 50 TABLET, FILM COATED ORAL at 04:38

## 2022-04-27 RX ADMIN — ASPIRIN 81 MG CHEWABLE TABLET 81 MG: 81 TABLET CHEWABLE at 09:07

## 2022-04-27 RX ADMIN — POTASSIUM CHLORIDE 20 MEQ: 750 TABLET, EXTENDED RELEASE ORAL at 09:07

## 2022-04-27 RX ADMIN — SACUBITRIL AND VALSARTAN 1 TABLET: 24; 26 TABLET, FILM COATED ORAL at 21:06

## 2022-04-27 RX ADMIN — DOBUTAMINE HYDROCHLORIDE 6 MCG/KG/MIN: 400 INJECTION INTRAVENOUS at 21:06

## 2022-04-27 RX ADMIN — FAMOTIDINE 20 MG: 20 TABLET ORAL at 09:08

## 2022-04-27 RX ADMIN — SUCRALFATE 1 G: 1 TABLET ORAL at 21:06

## 2022-04-27 RX ADMIN — SUCRALFATE 1 G: 1 TABLET ORAL at 12:01

## 2022-04-27 RX ADMIN — BUMETANIDE 2 MG: 1 TABLET ORAL at 09:08

## 2022-04-27 RX ADMIN — ACETAMINOPHEN 650 MG: 325 TABLET ORAL at 04:54

## 2022-04-27 RX ADMIN — FAMOTIDINE 20 MG: 20 TABLET ORAL at 21:06

## 2022-04-27 NOTE — PROGRESS NOTES
OUTING scheduled with Glenny Liang, patient's spouse () and daughter, Chun Shah () Friday, 4/29/22 at 1:00 pm. MIKI left a message with ROBERT Rodgers confirming date and time of outing. [employer] Serious Medical Condition Certification form emailed to [employer] on this date. Form to be scanned into medical record.

## 2022-04-27 NOTE — PROGRESS NOTES
Problem: Self Care Deficits Care Plan (Adult)  Goal: *Acute Goals and Plan of Care (Insert Text)  Description: FUNCTIONAL STATUS PRIOR TO ADMISSION: pt lives with wife, independent prior    HOME SUPPORT: The patient lived with wife but did not require assist.    Occupational Therapy Goals  Goals reviewed and upgraded as follows 4/25/2022  1. Patient will perform ADLs standing 5 mins without fatigue or LOB with modified independence within 7 day(s). upgraded  2. Patient will perform lower body ADLs with modified independence using legs crossed method within 7 day(s). upgraded  3. Patient will perform upper body ADLs with modified independence within 7 day(s). upgraded  4. Patient will perform toilet transfers with modified independence within 7 day(s). upgraded  5. Patient will perform all aspects of toileting with modified independence within 7 day(s). upgraded  6. Patient will participate in cardiac/sternal upper extremity therapeutic exercise/activities to increase independence with ADLs with supervision/set-up for 5 minutes within 7 day(s). continue  7. Patient will perform VAD switchover routine as part of ADL routine (including batteries<>batteries, battery clip<>battery clip, mock SC<>SC) with stand by/set-up assistance within 7 days. upgraded     Goals reviewed and remain appropriate 4/18/2022  Initiated 4/8/2022  1. Patient will perform ADLs standing 5 mins without fatigue or LOB with supervision/set-up within 7 day(s). 2.  Patient will perform lower body ADLs with minimal assistance/contact guard assist within 7 day(s). 3.  Patient will perform upper body ADLs with minimal assistance/contact guard assist within 7 day(s). 4.  Patient will perform toilet transfers with minimal assistance/contact guard assist within 7 day(s). 5.  Patient will perform all aspects of toileting with minimal assistance/contact guard assist within 7 day(s).   6.  Patient will participate in cardiac/sternal upper extremity therapeutic exercise/activities to increase independence with ADLs with supervision/set-up for 5 minutes within 7 day(s). 7.  Patient will perform VAD switchover routine as part of ADL routine (including batteries<>batteries, battery clip<>battery clip, mock SC<>SC) with minimal assistance/contact guard assist within 7 days. Outcome: Progressing Towards Goal   OCCUPATIONAL THERAPY TREATMENT  Patient: Jacki Love (89 y.o. male)  Date: 4/27/2022  Diagnosis: HFrEF (heart failure with reduced ejection fraction) ([facility]) [I50.20] <principal problem not specified>  Procedure(s) (LRB):  REDO STERNOTOMY; RIGHT GROIN CUTDOWN FOR CANNULATION;  INSERTION OF LEFT VENTRICULAR ASSIST DEVICE (HMIII); AORTIC VALVE CLOSURE CHEL BY DR. Luis A Delaney. (N/A) 22 Days Post-Op  Precautions: Fall (mindful movement, LVAD)  Chart, occupational therapy assessment, plan of care, and goals were reviewed. ASSESSMENT  Patient continues with skilled OT services and is progressing towards goals. Patient received semi supine in bed sleeping, easily arouses and agreeable to working with therapy. Patient transferred to [facility] bed and performed switchover, reviewed items in emergency back with 4/4 recall, and reviewed clip malfunction error with limited recall requiring max verbal cues for task. Patient donned holster vest and secured batteries in pockets prior to transfer to [facility]. Patient attempted lower extremity dressing at edge of bed doing bending forward method and patient stopped and cued for legs crossed to avoid deep bending and verbalized understanding. Discussed with patient that per CM, scheduled outing with family on Friday at [address] [place] [address], patient reports having clothes available already for outing and verbalized understanding to have wife bring different clothes if needed. Overall patient did well with session and will require reinforcement for all LVAD training prior to discharge.  Patient would benefit from skilled OT services during admission to improve independence with self care and functional mobility/transfers. Recommend discharge to [facility] at this time. Patient is verbalizing and is not demonstrating understanding of mindful-based movements (\"move in the tube\") principles of keeping UEs proximal to ribcage to prevent lateral pull on the sternum during load-bearing activities with verbal cues required for compliance. Current Level of Function Impacting Discharge (ADLs): supervision for mobility/transfers, supervision upper extremity dressing, SBA lower extremity dressing    Other factors to consider for discharge: prior level of function, family support          PLAN :  Patient continues to benefit from skilled intervention to address the above impairments. Continue treatment per established plan of care to address goals. Recommend with staff: up to chair 3x/day for meals, functional mobility to and from bathroom for toileting    Recommend next OT session: continue POC, LVAD outing as scheduled    Recommendation for discharge: (in order for the patient to meet his/her long term goals)  Occupational therapy at least 2 days/week in the home AND ensure assist and/or supervision for safety with LVAD maintenance     This discharge recommendation:  Has been made in collaboration with the attending provider and/or case management    IF patient discharges home will need the following DME: patient owns DME required for discharge       SUBJECTIVE:   Patient stated The bag has to be with me all the time.  re: emergency bag    OBJECTIVE DATA SUMMARY:   Cognitive/Behavioral Status:  Neurologic State: Alert                   Functional Mobility and Transfers for ADLs:  Bed Mobility:  Rolling: Supervision  Supine to Sit: Supervision  Scooting: Supervision    Transfers:  Sit to Stand: Supervision     Bed to Chair: Supervision    Balance:  Sitting: Intact  Standing: Impaired; Without support  Standing - Static: Good  Standing - Dynamic : Good;Occasional    ADL Intervention:                      Upper Body Dressing Assistance  Front Opened Shirt: Supervision (don holster vest)    Lower Body Dressing Assistance  Socks: Stand-by assistance  Position Performed: Seated edge of bed;Bending forward method (required cueing to avoid deep bend)       Patient demonstrated switchover from power module to battery in prep for ADL routine with verbal cues and physical assist to gather supplies only. Demonstrated the following tasks:    Independent Verbal cues Physical assistance Comments   Check PM & SC x      Ensure  clipped onto stabilization belt x      Remove front (green lighted) batteries from , place back batteries into front slots   x    Check batteries x      Position batteries into battery clips x      Don holster vest x      Disconnect power leads x      Insert power lead into battery clip  x     [place] batteries in holster x      Secure batteries with Velcro and clips x             Patient instructed and educated on mindful movement principles based on Move in The [place] concept to include maintaining bilateral elbows close to rib cage when performing any load-bearing activity such as getting in/out of bed, pushing up from a chair, opening a door, or lifting a box. Patient was given a handout with diagrams of each correct/incorrect method of performing each of the above tasks. Patient instructed on the ability to utilize upper extremities outside the tube when doing any non-load bearing activity such as washing hair/body, brushing teeth, retrieving clothing items, or scratching your back. Patient encouraged to also perform upper extremity exercises \"outside of the tube\" to prevent scar tissue formation around sternal incision site. Patient instructed in detail about activities to heed with caution, allowing pain to be the guide.  These activities include but are not limited to: mowing the lawn, riding a bike, walking a dog, lifting a child, workshop hobbies, golfing, sexual activity, vacuuming, fishing, scrubbing the floors, and moving furniture. Patient was given the [address] in the [place] handout to describe each of these activities in detail. Patient instructed no asymmetrical reaching over head to ensure B UEs when shoulders >90* i.e. reaching in cabinets and dressing. Instruction on upper body dressing techniques of over head, then arms through to decrease pain and unilateral shoulder flexion >90*. Instruction on the benefits of utilizing B UEs during functional tasks i.e. opening the fridge, stepping into the tub. May have to adjust home setup to increase ease with items closer to waist height to prevent deep bending and the automatic  of asymmetrical UE WB/pushing for stabilization during bending. Benefit to don clothing tailor sitting and don all clothing while sitting prior to standing. Patient demonstrated lower body dressing with Stand-by assistance. Instruction and indicated understanding on the benefits of loose clothing throughout to accommodate for post surgical swelling, decreased ROM and increased pain. Instruction and indicated understanding the technique of pull over shirt versus front open clothing. Increase activity tolerance for home, work, and sexual intercourse by pacing self with increasing the arm exercises, sitting duration, frequency OOB, walking, standing, and ADLs. Instructed and indicated understanding of s/s of too much activity, how to respond to s/s safely.       Pain:  None reported    Activity Tolerance:   Good and SpO2 stable on RA    After treatment patient left in no apparent distress:   Sitting in chair and Call bell within reach    COMMUNICATION/COLLABORATION:   The patients plan of care was discussed with: Physical therapist.     Nita South OTR/L  Time Calculation: 21 mins

## 2022-04-27 NOTE — PROGRESS NOTES
[facility] in [facility],  [facility]  Inpatient Progress Note    Patient name: Pritesh Jiménez  Patient : 1945  Patient MRN: [id number]  Consulting MD: Temi Ovalle MD  Date of service: 22    REASON FOR REFERRAL:  Management of LVAD    PLAN OF CARE:  · 69 y.o. male with severe ischemic cardiomyopathy, LVEF 15-20% s/p HM3 LVAD implant as DT on 22 by Dr. Roxi Smith c/b intraoperative bleeding requiring multiple blood products and subsequent RV failure with severe TR on high dose dobutamine IV and sternal wound infection on antibiotics  · Plan for remainder of hospitalization includes:  · Surveillance weekly echos to determine if RV function improves and TV coapts  · Will likely need Aspira cath prior to D/C; re-eval CT drainage tomorrow and decide   · PT/OT/Nutrition  · Optimization of GDMT     PLAN:  POD 21  TTE  shows mod to severe TR, severe RV dysfunction; continue to optimize medically   Continue speed 4800rpms, low speed 4400  Continue dobutamine 6 mcg/kg/min - will need to D/C home as bridge to RV recovery   Continue hydralazine 50 mg PO q8h   Continue amiodarone 200 mg PO daily x 2 weeks   Begin Entresto 24/26 mg PO BID   Cont Eplerenone 25mg daily  Continue Bumex 2 mg PO BID    Completed course of cefepime for sternal incision   Driveline dressing changes daily for now until 5/3/22  Needs EKG weekly on   Continue warfarin, goal INR 2-3; 4 mg tonight   ASA 81mg daily  Continue colchicine for pericarditis   Continue gabapentin 200 mg PO TID   IV fentanyl prn (pt with hx anaphylaxis to oxycodone)  PRN toradol  Continue bowel regimen   Afrin nasal spray PRN   Pulmonary toilet  Monitor CT output; consult to IR for Aspira cath in AM if drainage remains significant   CPAP QHS  Advanced care plan forms on file   Strict I/O, daily weights, Na+ restricted diet   Continue VAD education   Equipment ordered, will set goal for home this week   Outing scheduled for Friday 29 at 1 pm   Tentative D/C date of Monday, May 2     LAST 24 HOURS:  No complaints   mL out  + BM  Working with therapy     REVIEW OF SYSTEMS:  Review of Systems   Constitutional: Negative for chills, fever, malaise/fatigue and weight loss. Respiratory: Negative for cough and shortness of breath. Cardiovascular: Negative for chest pain, palpitations, orthopnea and leg swelling. Gastrointestinal: Negative for abdominal pain, nausea and vomiting. Neurological: Negative for dizziness and weakness. Psychiatric/Behavioral: Negative. LIFE GOALS:  Patient's personal goals include: getting stronger and going home soon  Important upcoming milestones or family events: The patient identifies the following as important for living well: being independent, being at home, enjoying family time    OBJECTIVE:  PHYSICAL EXAM:  Visit Vitals  /88   Pulse 75   Temp 97.9 °F (36.6 °C)   Resp 18   Ht 6' 1\" (1.854 m)   Wt 181 lb 3.5 oz (82.2 kg)   SpO2 100%   BMI 23.91 kg/m²       LVAD INTERROGATION:  Device interrogated in person  Device function normal, normal flow, no events  LVAD   Pump Speed (RPM): 4800  Pump Flow (LPM): 3.5  MAP: 88  PI (Pulsitility Index): 5.8  Power: 3.2   Test: Yes  Back Up  at Bedside & Labeled: Yes  Power Module Test: Yes  Driveline Site Care: Yes  Driveline Dressing: Clean, Dry, and Intact  MAP in Therapeutic Range (Outpatient): Yes  Testing  Alarms Reviewed: Yes  Back up SC speed: 4800  Back up Low Speed Limit: 4400  Emergency Equipment with Patient?: Yes  Emergency procedures reviewed?: Yes  Drive line site inspected?: No  Drive line intergrity inspected?: Yes  Drive line dressing changed?: No      Physical Exam  Vitals reviewed. Constitutional:       General: He is not in acute distress. Appearance: Normal appearance. Cardiovascular:      Rate and Rhythm: Normal rate and regular rhythm.       Comments: LVAD Hum noted on auscultation  Pulmonary:      Effort: Pulmonary effort is normal. No respiratory distress. Abdominal:      General: Bowel sounds are normal. There is no distension. Palpations: Abdomen is soft. Tenderness: There is no abdominal tenderness. Musculoskeletal:      Right lower leg: No edema. Left lower leg: No edema. Skin:     General: Skin is warm and dry. Capillary Refill: Capillary refill takes less than 2 seconds. Neurological:      General: No focal deficit present. Mental Status: He is alert and oriented to person, place, and time. Psychiatric:         Mood and Affect: Mood normal.         Behavior: Behavior normal.         Thought Content: Thought content normal.         Judgment: Judgment normal.            CARDIAC IMAGING AND DIAGNOSTICS REVIEWED:  Echo 4/26/22 - LVIDd 4.9 cm, TAPSE 0.5 cm,   Echo 4/18/22- LVEF 15-20%, severe TR, TAPSE 0.5cm, RVIDd 6cm   Echo (4/8/22)    Left Ventricle: Left ventricle size is normal. Moderately increased wall thickness. Findings consistent with concentric remodeling. Severe global hypokinesis present. Severely reduced left ventricular systolic function with a visually estimated EF of 10 -15%. 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   Aortic Valve: Moderate sclerosis of the aortic valve cusp.   Mitral Valve: Moderately thickened leaflet. Mild transvalvular regurgitation.   Tricuspid Valve: Severe transvalvular regurgitation.   Right Atrium: Right atrium is severely dilated. Echo (3/2/22)   · LV 10-15%  · Mod-severe MR      Echo (11/23/21):  · LV 15-20%. · Mod-severe, MR, mod-TR     Echo (5/20/21)  · LV: Estimated LVEF is 20 - 25%. Normal wall thickness. Mildly dilated left ventricle. Severely and globally reduced systolic function. Severe (grade 4) left ventricular diastolic dysfunction. · LA: Severely dilated left atrium. · RA: Severely dilated right atrium.   · MV: Moderate mitral valve regurgitation is present. · TV: Moderate tricuspid valve regurgitation is present. · AO: Mild aortic root dilatation. · RV: Pacer/ICD present. · LVED 6.2cm     EKG (5/20/21) SB with 1degree AV block, QRS 116ms     LHC (5/24/21) Native Coronaries: LM - moderate to severe distal disease 50%. % prox occluded (), heavily calcified, LCx: 80% proximal stenosis. OM1 is  (seen filling faintly via collaterals). OM2 99% stenosis. RCA: 100% proximal occluded. LIMA to LAD: patent, supplies only a diagonal branch (probably D2). The LAD distal to the bifurcation is 100% occluded () and fills via right to left collaterals off the SVG to RCA. SVG to R-PDA: patent with moderate diffuse irregularities but no obstructive disease in the graft. No appealing interventional targets. NST as above     ICD Interrogation (11/12/2021) [employer] VVI, thoracic impedence trending up, no events, normal device function and good battery  ICD interrogation (5/12/21) [employer] single lead, no events, normal device function and good battery     HEMODYNAMICS:  RHC 5/24/21 CI 1.97, PA 33/9/17, RA 1, PCWP 6- off milrinone   CPEST not done     Patient underwent a 6 minute walk test 11/12/2021     6 Min Walk Report 11/12/2021 7/6/2021 5/20/2021   (PRE) HR 88 98 74   (PRE) O2 Sat 100 - 99   (POST)  - 81   (POST) O2 Sat 99 98% on Ra 99   Distance in Meters 386.58 - 1158.24       OTHER IMAGING:  CXR (6/17/21) clear  CT 5/21/21 1. Irregular pulmonary nodule in the left upper lobe measuring 2 cm, suspicious for primary pulmonary malignancy. 2. Additional 6 mm left upper lobe pulmonary nodule. 3. Severe calcific atherosclerosis of the coronary arteries and abdominal aorta. No aneurysm. 4. Cardiomegaly. 5. No acute abnormality within the chest, abdomen, or pelvis.       LABORATORY RESULTS:     Labs Latest Ref Rng & Units 4/27/2022 4/26/2022 4/25/2022 4/25/2022 4/25/2022 4/24/2022 4/23/2022 WBC 4.1 - 11.1 K/uL 5.0 4.7 6.5 5.4 5.0 6.1 6.8   RBC 4.10 - 5.70 M/uL 2.99(L) 2.96(L) 3.29(L) 2.96(L) 2.61(L) 2.82(L) 2.96(L)   Hemoglobin 12.1 - 17.0 g/dL 7. 7(L) 7. 8(L) 8.6(L) 7. 6(L) 6. 8(L) 7. 5(L) 7. 7(L)   Hematocrit 36.6 - 50.3 % 25. 0(L) 24. 8(L) 27. 6(L) 24. 5(L) 22. 0(L) 23. 9(L) 24. 3(L)   MCV 80.0 - 99.0 FL 83.6 83.8 83.9 82.8 84.3 84.8 82.1   Platelets 164 - 430 K/uL 257 259 269 249 222 248 270   Lymphocytes 12 - 49 % - - - - - - -   Monocytes 5 - 13 % - - - - - - -   Eosinophils 0 - 7 % - - - - - - -   Basophils 0 - 1 % - - - - - - -   Bands 0 - 6 % - - - - - - -   Albumin 3.5 - 5.0 g/dL 2. 9(L) 2. 7(L) 3.0(L) - 2. 7(L) 2. 6(L) 2. 8(L)   Calcium 8.5 - 10.1 MG/DL 8.6 8.2(L) 8.0(L) - 7. 6(L) 8.0(L) 8.4(L)   Glucose 65 - 100 mg/dL 92 109(H) 157(H) - 103(H) 105(H) 87   BUN 6 - 20 MG/DL 26(H) 23(H) 21(H) - 19 22(H) 23(H)   Creatinine 0.70 - 1.30 MG/DL 0.96 0.94 0.90 - 0.84 0.83 0.94   Sodium 136 - 145 mmol/L 135(L) 133(L) 132(L) - 133(L) 134(L) 133(L)   Potassium 3.5 - 5.1 mmol/L 3.9 4.0 4.1 - 3.8 4.3 4.0   TSH 0.36 - 3.74 uIU/mL - - - - - - -   LDH 85 - 241 U/L 169 190 - - 169 218 199   Some recent data might be hidden     Lab Results   Component Value Date/Time    TSH 2.26 04/10/2022 03:00 AM    TSH 1.68 03/01/2022 11:50 AM    TSH 1.47 05/26/2021 10:44 PM    TSH 1.97 05/20/2021 04:28 PM    TSH 1.41 02/09/2021 03:05 PM    TSH 1.740 08/14/2018 09:58 AM    TSH 1.710 10/18/2017 12:00 AM         HISTORY:  PAST MEDICAL HISTORY:  Past Medical History:   Diagnosis Date    CAD (coronary artery disease) '90 '97, '13 x 2    [place] [facility] [place] Calculus of kidney     Colonic polyps     Congestive heart failure, unspecified     Diabetes ([facility] [address]     Gastritis     Hypercholesterolemia     Sleep apnea        PAST SURGICAL HISTORY:  Past Surgical History:   Procedure Laterality Date    COLONOSCOPY  04/06/2011    16, due 21    COLONOSCOPY N/A 3/2/2022    COLONOSCOPY    :- [facility] Robert Kehr[facility] ENDOSCOPY    ENDOSCOPY, COLON, DIAGNOSTIC      11, due 16    HX CORONARY ARTERY BYPASS GRAFT  8/22/12    x 4 vessel by MISSY Ramirez    HX HEENT      LASIK    HX PACEMAKER PLACEMENT  1/30/13    MA CARDIAC SURG PROCEDURE UNLIST  2012    x 4 vessels       FAMILY HISTORY:  Family History   Problem Relation Age of Onset    Heart Disease Mother         MI    Heart Disease Father         CAD & PVD    Lung Disease Father     Cancer Father         lung    Diabetes Maternal Grandmother     Heart Disease Other         CAD    Stroke Sister        SOCIAL HISTORY:  Social History     Socioeconomic History    Marital status:    Tobacco Use    Smoking status: Never Smoker    Smokeless tobacco: Never Used   Vaping Use    Vaping Use: Never used   Substance and Sexual Activity    Alcohol use:  Yes     Alcohol/week: 0.0 standard drinks     Comment:  VERY RARE    Drug use: No       ALLERGY:  Allergies   Allergen Reactions    Oxycodone Anaphylaxis    Pcn [Penicillins] Hives        CURRENT MEDICATIONS:    Current Facility-Administered Medications:     sacubitriL-valsartan (ENTRESTO) 24-26 mg tablet 1 Tablet, 1 Tablet, Oral, Q12H, Junaid Wong NP    warfarin (COUMADIN) tablet 4 mg, 4 mg, Oral, ONCE, Junaid Wong NP    bumetanide (BUMEX) tablet 2 mg, 2 mg, Oral, BID, Jf Wong NP, 2 mg at 04/27/22 0908    amiodarone (CORDARONE) tablet 200 mg, 200 mg, Oral, DAILY, Jf oWng NP, 200 mg at 04/27/22 0908    DOBUTamine (DOBUTREX) 1,000 mg/250 mL (4,000 mcg/mL) infusion, 6 mcg/kg/min, IntraVENous, CONTINUOUS, Jf Wong NP, Last Rate: 7.5 mL/hr at 04/26/22 0912, 6 mcg/kg/min at 04/26/22 0912    ketorolac (TORADOL) injection 15 mg, 15 mg, IntraVENous, Q6H PRN, Millie Wong NP, 15 mg at 04/26/22 0940    sodium chloride (OCEAN) 0.65 % nasal squeeze bottle 2 Spray, 2 Spray, Both Nostrils, Q2H PRN, Danica Stewart NP    docusate sodium (COLACE) capsule 100 mg, 100 mg, Oral, DAILY, Terry, Danica B, NP, 100 mg at 04/27/22 0908    eplerenone (INSPRA) tablet 25 mg, 25 mg, Oral, DAILY, Terry, Danica B, NP, 25 mg at 04/27/22 0908    potassium chloride SR (KLOR-CON 10) tablet 20 mEq, 20 mEq, Oral, DAILY, Terry, Danica B, NP, 20 mEq at 04/27/22 0907    guaiFENesin (ROBITUSSIN) 100 mg/5 mL oral liquid 100 mg, 100 mg, Oral, Q4H PRN, Latonia Stock MD, 100 mg at 04/22/22 2314    polyethylene glycol (MIRALAX) packet 17 g, 17 g, Oral, DAILY, Loreatha Kilts B, NP, 17 g at 04/26/22 0920    senna-docusate (PERICOLACE) 8.6-50 mg per tablet 1 Tablet, 1 Tablet, Oral, DAILY PRN, Loreatha Sushilts B, NP, 1 Tablet at 04/24/22 0913    oxymetazoline (AFRIN) 0.05 % nasal spray 2 Spray, 2 Spray, Both Nostrils, BID PRN, Loreatha Kilts B, NP, 2 [place] at 04/24/22 1612    hydrALAZINE (APRESOLINE) tablet 50 mg, 50 mg, Oral, Q8H, Polliard, Millie T, NP, 50 mg at 04/27/22 1201    colchicine tablet 0.6 mg, 0.6 mg, Oral, DAILY, Polliard, Roselia Stagers T, NP, 0.6 mg at 04/27/22 0908    alteplase (CATHFLO) 1 mg in sterile water (preservative free) 1 mL injection, 1 mg, InterCATHeter, PRN, Sivan Collado MD, 1 mg at 04/25/22 2136    bisacodyL (DULCOLAX) suppository 10 mg, 10 mg, Rectal, DAILY PRN, Meggan Patino MD, 10 mg at 04/12/22 1533    gabapentin (NEURONTIN) capsule 200 mg, 200 mg, Oral, TID, Polliard, Roselia Stagers T, NP, 200 mg at 04/27/22 0908    albumin human 25% (BUMINATE) solution 12.5 g, 12.5 g, IntraVENous, BID, Roselia Wong, NP, 12.5 g at 04/27/22 0907    famotidine (PEPCID) tablet 20 mg, 20 mg, Oral, Q12H, Meggan Patino MD, 20 mg at 04/27/22 0908    mirtazapine (REMERON) tablet 7.5 mg, 7.5 mg, Oral, QHS, Zeus Stewartin B, NP, 7.5 mg at 04/26/22 2141    lidocaine 4 % patch 1 Patch, 1 Patch, TransDERmal, Q24H, Terry Danica B, NP, 1 Patch at 04/27/22 1201    Warfarin NP/MD dosing, , Other, PRN, Terry, Danica B, NP    melatonin tablet 3 mg, 3 mg, Oral, QHS PRN, Bernardo Brady MD, 3 mg at 04/20/22 2127    hydrALAZINE (APRESOLINE) 20 mg/mL injection 10 mg, 10 mg, IntraVENous, Q6H PRN, Orlando Ivan B, NP, 10 mg at 04/24/22 1415    aspirin chewable tablet 81 mg, 81 mg, Oral, DAILY, Orlando Ivan B, NP, 81 mg at 04/27/22 0907    hydrALAZINE (APRESOLINE) 20 mg/mL injection 5 mg, 5 mg, IntraVENous, Q6H PRN, Orlando Ivan B, NP, 5 mg at 04/13/22 0402    sodium chloride (NS) flush 5-40 mL, 5-40 mL, IntraVENous, Q8H, PolliardMyrona WINSTON, NP, 10 mL at 04/27/22 0650    sodium chloride (NS) flush 5-40 mL, 5-40 mL, IntraVENous, PRN, Polliard, Veronica Leung T, NP, 10 mL at 04/24/22 0914    0.9% sodium chloride infusion, 9 mL/hr, IntraVENous, CONTINUOUS, Veronica Wong, NP, Last Rate: 3 mL/hr at 04/20/22 0513, 3 mL/hr at 04/20/22 0513    acetaminophen (TYLENOL) tablet 650 mg, 650 mg, Oral, Q6H PRN, Ashley Wong, NP, 650 mg at 04/27/22 0454    naloxone (NARCAN) injection 0.4 mg, 0.4 mg, IntraVENous, PRN, Ashley Wong, NP    [facility]ron [facility]) injection 4 mg, 4 mg, IntraVENous, Q4H PRN, PolliarVeronica reynoso, NP, 4 mg at 04/11/22 2139    albuterol-ipratropium (DUO-NEB) 2.5 MG-0.5 MG/3 ML, 3 mL, Nebulization, Q6H PRN, Ashley Wong, NP    glucose chewable tablet 16 g, 4 Tablet, Oral, PRN, PolliardAshley, NP    glucagon (GLUCAGEN) injection 1 mg, 1 mg, IntraMUSCular, PRN, Ashley Wong NP    insulin lispro (HUMALOG) injection, , SubCUTAneous, AC&HS, Ashley Wong NP, 2 Units at 04/24/22 1310    sucralfate (CARAFATE) tablet 1 g, 1 g, Oral, AC&HS, Veronica Wong NP, 1 g at 04/27/22 1201    0.45% sodium chloride infusion, 10 mL/hr, IntraVENous, CONTINUOUS, Silvina Najera MD, Stopped at 04/10/22 0730    fentaNYL citrate (PF) injection 25 mcg, 25 mcg, IntraVENous, Q2H PRN, Ashley Wong NP, 25 mcg at 04/25/22 1251    PATIENT CARE TEAM:  Patient Care Team:  Mg Causey MD as PCP - General (Internal Medicine)  Jeffery Pride MD ROBERT as PCP - [address] [place] [address]  Empaneled Provider  Viviana Tee MD as Physician (Cardiology)  Jt Foss MD as Physician (Gastroenterology)  Donald Seth MD as Physician (Orthopedic Surgery)  Apollo Kay MD as Physician (Ophthalmology)  Jose Vang MD (Cardiology)  Parris Lu MD (Cardiology)       Thank you for allowing us to participate in this patient's care.       Alireza Alanis NP   [address] [place] [address], [address]  Phone: [phone]

## 2022-04-27 NOTE — PROGRESS NOTES
Physical Therapy - defer  Chart reviewed, report received from OT. Received pt resting in bed. Pt declined therapy session stating he just returned to bed, wants to rest.  Will check back later as able or follow up tomorrow.

## 2022-04-27 NOTE — PROGRESS NOTES
1930: Bedside shift change report given to Alexandro Gilford, RN (oncoming nurse) by Francis Chowdhury RN (offgoing nurse). Report included the following information SBAR, Kardex, Intake/Output, MAR, and Recent Results. 0730: Bedside shift change report given to Francis Chowdhury RN (oncoming nurse) by Alexandro Gilford, RN (offgoing nurse). Report included the following information SBAR, Kardex, Intake/Output, MAR and Recent Results. Problem: Falls - Risk of  Goal: *Absence of Falls  Description: Document Adeline Farooq Fall Risk and appropriate interventions in the flowsheet.   Outcome: Progressing Towards Goal  Note: Fall Risk Interventions:  Mobility Interventions: Patient to call before getting OOB,Bed/chair exit alarm         Medication Interventions: Patient to call before getting OOB,Teach patient to arise slowly    Elimination Interventions: Call light in reach,Patient to call for help with toileting needs,Toileting schedule/hourly rounds       Problem: LVAD Post-op Day 8 to day 14  Goal: Activity/Safety  Outcome: Progressing Towards Goal  Goal: Medications  Outcome: Progressing Towards Goal  Goal: Respiratory  Outcome: Progressing Towards Goal  Goal: *Adequate oxygenation  Outcome: Progressing Towards Goal

## 2022-04-28 ENCOUNTER — APPOINTMENT (OUTPATIENT)
Dept: GENERAL RADIOLOGY | Age: 77
DRG: 001 | End: 2022-04-28
Attending: NURSE PRACTITIONER
Payer: MEDICARE

## 2022-04-28 LAB
ALBUMIN SERPL-MCNC: 2.8 G/DL (ref 3.5–5)
ALBUMIN/GLOB SERPL: 1 {RATIO} (ref 1.1–2.2)
ALP SERPL-CCNC: 143 U/L (ref 45–117)
ALT SERPL-CCNC: 25 U/L (ref 12–78)
ANION GAP SERPL CALC-SCNC: 3 MMOL/L (ref 5–15)
AST SERPL-CCNC: 76 U/L (ref 15–37)
BILIRUB SERPL-MCNC: 0.7 MG/DL (ref 0.2–1)
BNP SERPL-MCNC: 2793 PG/ML
BUN SERPL-MCNC: 26 MG/DL (ref 6–20)
BUN/CREAT SERPL: 24 (ref 12–20)
CALCIUM SERPL-MCNC: 8.2 MG/DL (ref 8.5–10.1)
CHLORIDE SERPL-SCNC: 100 MMOL/L (ref 97–108)
CO2 SERPL-SCNC: 31 MMOL/L (ref 21–32)
CREAT SERPL-MCNC: 1.08 MG/DL (ref 0.7–1.3)
ERYTHROCYTE [DISTWIDTH] IN BLOOD BY AUTOMATED COUNT: 19.9 % (ref 11.5–14.5)
GLOBULIN SER CALC-MCNC: 2.8 G/DL (ref 2–4)
GLUCOSE BLD STRIP.AUTO-MCNC: 121 MG/DL (ref 65–117)
GLUCOSE BLD STRIP.AUTO-MCNC: 145 MG/DL (ref 65–117)
GLUCOSE BLD STRIP.AUTO-MCNC: 151 MG/DL (ref 65–117)
GLUCOSE BLD STRIP.AUTO-MCNC: 161 MG/DL (ref 65–117)
GLUCOSE SERPL-MCNC: 93 MG/DL (ref 65–100)
HCT VFR BLD AUTO: 24.2 % (ref 36.6–50.3)
HGB BLD-MCNC: 7.5 G/DL (ref 12.1–17)
INR PPP: 2.8 (ref 0.9–1.1)
LDH SERPL L TO P-CCNC: 188 U/L (ref 85–241)
MAGNESIUM SERPL-MCNC: 1.9 MG/DL (ref 1.6–2.4)
MCH RBC QN AUTO: 25.8 PG (ref 26–34)
MCHC RBC AUTO-ENTMCNC: 31 G/DL (ref 30–36.5)
MCV RBC AUTO: 83.2 FL (ref 80–99)
NRBC # BLD: 0 K/UL (ref 0–0.01)
NRBC BLD-RTO: 0 PER 100 WBC
PLATELET # BLD AUTO: 261 K/UL (ref 150–400)
PMV BLD AUTO: 9.6 FL (ref 8.9–12.9)
POTASSIUM SERPL-SCNC: 4 MMOL/L (ref 3.5–5.1)
PROT SERPL-MCNC: 5.6 G/DL (ref 6.4–8.2)
PROTHROMBIN TIME: 27.3 SEC (ref 9–11.1)
RBC # BLD AUTO: 2.91 M/UL (ref 4.1–5.7)
SERVICE CMNT-IMP: ABNORMAL
SODIUM SERPL-SCNC: 134 MMOL/L (ref 136–145)
WBC # BLD AUTO: 4.8 K/UL (ref 4.1–11.1)

## 2022-04-28 PROCEDURE — 71045 X-RAY EXAM CHEST 1 VIEW: CPT

## 2022-04-28 PROCEDURE — 77030037442 HC TY LVAD MGMT SYST CMP-B

## 2022-04-28 PROCEDURE — 97116 GAIT TRAINING THERAPY: CPT

## 2022-04-28 PROCEDURE — 82962 GLUCOSE BLOOD TEST: CPT

## 2022-04-28 PROCEDURE — 83735 ASSAY OF MAGNESIUM: CPT

## 2022-04-28 PROCEDURE — 65660000001 HC RM ICU INTERMED STEPDOWN

## 2022-04-28 PROCEDURE — 74011000250 HC RX REV CODE- 250: Performed by: NURSE PRACTITIONER

## 2022-04-28 PROCEDURE — 74011250637 HC RX REV CODE- 250/637: Performed by: NURSE PRACTITIONER

## 2022-04-28 PROCEDURE — 83880 ASSAY OF NATRIURETIC PEPTIDE: CPT

## 2022-04-28 PROCEDURE — 85027 COMPLETE CBC AUTOMATED: CPT

## 2022-04-28 PROCEDURE — 2709999900 HC NON-CHARGEABLE SUPPLY

## 2022-04-28 PROCEDURE — P9047 ALBUMIN (HUMAN), 25%, 50ML: HCPCS | Performed by: NURSE PRACTITIONER

## 2022-04-28 PROCEDURE — 74011250637 HC RX REV CODE- 250/637: Performed by: INTERNAL MEDICINE

## 2022-04-28 PROCEDURE — 97530 THERAPEUTIC ACTIVITIES: CPT

## 2022-04-28 PROCEDURE — 85610 PROTHROMBIN TIME: CPT

## 2022-04-28 PROCEDURE — 97535 SELF CARE MNGMENT TRAINING: CPT

## 2022-04-28 PROCEDURE — 36415 COLL VENOUS BLD VENIPUNCTURE: CPT

## 2022-04-28 PROCEDURE — 83615 LACTATE (LD) (LDH) ENZYME: CPT

## 2022-04-28 PROCEDURE — 99232 SBSQ HOSP IP/OBS MODERATE 35: CPT | Performed by: NURSE PRACTITIONER

## 2022-04-28 PROCEDURE — 80053 COMPREHEN METABOLIC PANEL: CPT

## 2022-04-28 PROCEDURE — 74011250636 HC RX REV CODE- 250/636: Performed by: NURSE PRACTITIONER

## 2022-04-28 PROCEDURE — 93750 INTERROGATION VAD IN PERSON: CPT | Performed by: NURSE PRACTITIONER

## 2022-04-28 RX ORDER — BUMETANIDE 1 MG/1
2 TABLET ORAL 3 TIMES DAILY
Status: DISCONTINUED | OUTPATIENT
Start: 2022-04-28 | End: 2022-04-28

## 2022-04-28 RX ORDER — BUMETANIDE 1 MG/1
2 TABLET ORAL 2 TIMES DAILY
Status: DISCONTINUED | OUTPATIENT
Start: 2022-04-28 | End: 2022-04-28

## 2022-04-28 RX ORDER — LANOLIN ALCOHOL/MO/W.PET/CERES
400 CREAM (GRAM) TOPICAL DAILY
Status: DISCONTINUED | OUTPATIENT
Start: 2022-04-28 | End: 2022-05-06 | Stop reason: HOSPADM

## 2022-04-28 RX ORDER — BUMETANIDE 1 MG/1
2 TABLET ORAL 2 TIMES DAILY
Status: DISCONTINUED | OUTPATIENT
Start: 2022-04-28 | End: 2022-05-06 | Stop reason: HOSPADM

## 2022-04-28 RX ORDER — WARFARIN 4 MG/1
4 TABLET ORAL ONCE
Status: COMPLETED | OUTPATIENT
Start: 2022-04-28 | End: 2022-04-28

## 2022-04-28 RX ADMIN — SUCRALFATE 1 G: 1 TABLET ORAL at 21:07

## 2022-04-28 RX ADMIN — SODIUM CHLORIDE, PRESERVATIVE FREE 10 ML: 5 INJECTION INTRAVENOUS at 21:27

## 2022-04-28 RX ADMIN — MIRTAZAPINE 7.5 MG: 15 TABLET, FILM COATED ORAL at 21:09

## 2022-04-28 RX ADMIN — EPLERENONE 25 MG: 25 TABLET, FILM COATED ORAL at 09:46

## 2022-04-28 RX ADMIN — BUMETANIDE 2 MG: 1 TABLET ORAL at 09:43

## 2022-04-28 RX ADMIN — SUCRALFATE 1 G: 1 TABLET ORAL at 13:33

## 2022-04-28 RX ADMIN — ACETAMINOPHEN 650 MG: 325 TABLET ORAL at 21:09

## 2022-04-28 RX ADMIN — AMIODARONE HYDROCHLORIDE 200 MG: 200 TABLET ORAL at 09:45

## 2022-04-28 RX ADMIN — GABAPENTIN 200 MG: 100 CAPSULE ORAL at 21:09

## 2022-04-28 RX ADMIN — GABAPENTIN 200 MG: 100 CAPSULE ORAL at 16:57

## 2022-04-28 RX ADMIN — SACUBITRIL AND VALSARTAN 1 TABLET: 24; 26 TABLET, FILM COATED ORAL at 21:09

## 2022-04-28 RX ADMIN — SODIUM CHLORIDE, PRESERVATIVE FREE 10 ML: 5 INJECTION INTRAVENOUS at 13:33

## 2022-04-28 RX ADMIN — DOCUSATE SODIUM 100 MG: 100 CAPSULE, LIQUID FILLED ORAL at 09:43

## 2022-04-28 RX ADMIN — FAMOTIDINE 20 MG: 20 TABLET ORAL at 09:45

## 2022-04-28 RX ADMIN — ASPIRIN 81 MG CHEWABLE TABLET 81 MG: 81 TABLET CHEWABLE at 09:45

## 2022-04-28 RX ADMIN — Medication 400 MG: at 13:33

## 2022-04-28 RX ADMIN — ALBUMIN (HUMAN) 12.5 G: 0.25 INJECTION, SOLUTION INTRAVENOUS at 10:38

## 2022-04-28 RX ADMIN — SACUBITRIL AND VALSARTAN 1 TABLET: 24; 26 TABLET, FILM COATED ORAL at 09:44

## 2022-04-28 RX ADMIN — HYDRALAZINE HYDROCHLORIDE 50 MG: 50 TABLET, FILM COATED ORAL at 13:33

## 2022-04-28 RX ADMIN — WARFARIN SODIUM 4 MG: 4 TABLET ORAL at 17:12

## 2022-04-28 RX ADMIN — ALBUMIN (HUMAN) 12.5 G: 0.25 INJECTION, SOLUTION INTRAVENOUS at 17:12

## 2022-04-28 RX ADMIN — COLCHICINE 0.6 MG: 0.6 TABLET, FILM COATED ORAL at 09:45

## 2022-04-28 RX ADMIN — SUCRALFATE 1 G: 1 TABLET ORAL at 16:58

## 2022-04-28 RX ADMIN — SODIUM CHLORIDE, PRESERVATIVE FREE 10 ML: 5 INJECTION INTRAVENOUS at 06:38

## 2022-04-28 RX ADMIN — GABAPENTIN 200 MG: 100 CAPSULE ORAL at 09:44

## 2022-04-28 RX ADMIN — HYDRALAZINE HYDROCHLORIDE 50 MG: 50 TABLET, FILM COATED ORAL at 03:59

## 2022-04-28 RX ADMIN — SUCRALFATE 1 G: 1 TABLET ORAL at 06:38

## 2022-04-28 RX ADMIN — BUMETANIDE 2 MG: 1 TABLET ORAL at 16:57

## 2022-04-28 RX ADMIN — HYDRALAZINE HYDROCHLORIDE 50 MG: 50 TABLET, FILM COATED ORAL at 21:09

## 2022-04-28 RX ADMIN — POTASSIUM CHLORIDE 20 MEQ: 750 TABLET, EXTENDED RELEASE ORAL at 09:44

## 2022-04-28 RX ADMIN — FAMOTIDINE 20 MG: 20 TABLET ORAL at 21:08

## 2022-04-28 NOTE — PROGRESS NOTES
[address] [place] [address]  Inpatient Progress Note    Patient name: Marilu Streeter  Patient : 1945  Patient MRN: [id number]  Consulting MD: Marcela Beck MD  Date of service: 22    REASON FOR REFERRAL:  Management of LVAD    PLAN OF CARE:  · 69 y.o. male with severe ischemic cardiomyopathy, LVEF 15-20% s/p HM3 LVAD implant as DT on 22 by Dr. Domonique Tamayo c/b intraoperative bleeding requiring multiple blood products and subsequent RV failure with severe TR on high dose dobutamine IV  · Plan for remainder of hospitalization includes:  · Surveillance weekly echos to determine if RV function improves and TV coapts  · Will likely need Aspira cath prior to D/C  · PT/OT/Nutrition  · Optimization of GDMT     PLAN:  POD 22  TTE  shows mod to severe TR, severe RV dysfunction; continue to optimize medically   Continue speed 4800rpms, low speed 4400  Continue dobutamine 6 mcg/kg/min - will need to D/C home as bridge to RV recovery   Continue hydralazine 50 mg PO q8h   Continue amiodarone 200 mg PO daily x 2 weeks   Continue Entresto 24/26 mg PO BID   Cont Eplerenone 25mg daily  Continue Bumex 2 mg PO BID    Completed course of cefepime for sternal incision   Driveline dressing changes daily for now until 5/3/22  Needs EKG weekly on   Continue warfarin, goal INR 2-3; 4 mg tonight   ASA 81mg daily  Continue colchicine for pericarditis   Continue gabapentin 200 mg PO TID   IV fentanyl prn (pt with hx anaphylaxis to oxycodone)  PRN toradol  Continue bowel regimen   Small right PTX noted on CXR this AM; per Dr. Domonique Tamayo, plan to keep CTs in until Monday and then assess need for Aspira cath next week   CPAP qHS  Advanced care plan forms on file   Strict I/O, daily weights, Na+ restricted diet   Continue VAD education   Equipment ordered  Outing scheduled for  at 1 pm   Tentative D/C date next week     LAST 24 HOURS:  Small PTX on CXR  Incomplete CT OP recorded   Tolerating Entresto      REVIEW OF SYSTEMS:  Review of Systems   Constitutional: Negative for chills, fever, malaise/fatigue and weight loss. Respiratory: Negative for cough and shortness of breath. Cardiovascular: Negative for chest pain, palpitations, orthopnea and leg swelling. Gastrointestinal: Negative for abdominal pain, nausea and vomiting. Neurological: Negative for dizziness and weakness. Psychiatric/Behavioral: Negative. LIFE GOALS:  Patient's personal goals include: getting stronger and going home soon  Important upcoming milestones or family events: The patient identifies the following as important for living well: being independent, being at home, enjoying family time    OBJECTIVE:  PHYSICAL EXAM:  Visit Vitals  /88   Pulse 79   Temp 98.8 °F (37.1 °C)   Resp 18   Ht 6' 1\" (1.854 m)   Wt 179 lb 14.3 oz (81.6 kg)   SpO2 94%   BMI 23.73 kg/m²       LVAD INTERROGATION:  Device interrogated in person  Device function normal, normal flow, no events  LVAD   Pump Speed (RPM): 4800  Pump Flow (LPM): 4  MAP: 88  PI (Pulsitility Index): 3.9  Power: 3.1   Test: Yes  Back Up  at Bedside & Labeled: Yes  Power Module Test: No  Driveline Site Care: No  Driveline Dressing: Clean, Dry, and Intact  MAP in Therapeutic Range (Outpatient): Yes  Testing  Alarms Reviewed: Yes  Back up SC speed: 4800  Back up Low Speed Limit: 4400  Emergency Equipment with Patient?: Yes  Emergency procedures reviewed?: Yes  Drive line site inspected?: No  Drive line intergrity inspected?: Yes  Drive line dressing changed?: No      Physical Exam  Vitals reviewed. Constitutional:       General: He is not in acute distress. Appearance: Normal appearance. Cardiovascular:      Rate and Rhythm: Normal rate and regular rhythm. Comments: LVAD Hum noted on auscultation  Pulmonary:      Effort: Pulmonary effort is normal. No respiratory distress.    Abdominal: General: Bowel sounds are normal. There is no distension. Palpations: Abdomen is soft. Tenderness: There is no abdominal tenderness. Musculoskeletal:      Right lower leg: No edema. Left lower leg: No edema. Skin:     General: Skin is warm and dry. Capillary Refill: Capillary refill takes less than 2 seconds. Neurological:      General: No focal deficit present. Mental Status: He is alert and oriented to person, place, and time. Psychiatric:         Mood and Affect: Mood normal.         Behavior: Behavior normal.         Thought Content: Thought content normal.         Judgment: Judgment normal.            CARDIAC IMAGING AND DIAGNOSTICS REVIEWED:  Echo 4/26/22 - LVIDd 4.9 cm, TAPSE 0.5 cm,   Echo 4/18/22- LVEF 15-20%, severe TR, TAPSE 0.5cm, RVIDd 6cm   Echo (4/8/22)    Left Ventricle: Left ventricle size is normal. Moderately increased wall thickness. Findings consistent with concentric remodeling. Severe global hypokinesis present. Severely reduced left ventricular systolic function with a visually estimated EF of 10 -15%. 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   Aortic Valve: Moderate sclerosis of the aortic valve cusp.   Mitral Valve: Moderately thickened leaflet. Mild transvalvular regurgitation.   Tricuspid Valve: Severe transvalvular regurgitation.   Right Atrium: Right atrium is severely dilated. Echo (3/2/22)   · LV 10-15%  · Mod-severe MR      Echo (11/23/21):  · LV 15-20%. · Mod-severe, MR, mod-TR     Echo (5/20/21)  · LV: Estimated LVEF is 20 - 25%. Normal wall thickness. Mildly dilated left ventricle. Severely and globally reduced systolic function. Severe (grade 4) left ventricular diastolic dysfunction. · LA: Severely dilated left atrium. · RA: Severely dilated right atrium. · MV: Moderate mitral valve regurgitation is present. · TV: Moderate tricuspid valve regurgitation is present.   · AO: Mild aortic root dilatation. · RV: Pacer/ICD present. · LVED 6.2cm     EKG (5/20/21) SB with 1degree AV block, QRS 116ms     LHC (5/24/21) Native Coronaries: LM - moderate to severe distal disease 50%. % prox occluded (), heavily calcified, LCx: 80% proximal stenosis. OM1 is  (seen filling faintly via collaterals). OM2 99% stenosis. RCA: 100% proximal occluded. LIMA to LAD: patent, supplies only a diagonal branch (probably D2). The LAD distal to the bifurcation is 100% occluded () and fills via right to left collaterals off the SVG to RCA. SVG to R-PDA: patent with moderate diffuse irregularities but no obstructive disease in the graft. No appealing interventional targets. NST as above     ICD Interrogation (11/12/2021) [employer] VVI, thoracic impedence trending up, no events, normal device function and good battery  ICD interrogation (5/12/21) [employer] single lead, no events, normal device function and good battery     HEMODYNAMICS:  RHC 5/24/21 CI 1.97, PA 33/9/17, RA 1, PCWP 6- off milrinone   CPEST not done     Patient underwent a 6 minute walk test 11/12/2021     6 Min Walk Report 11/12/2021 7/6/2021 5/20/2021   (PRE) HR 88 98 74   (PRE) O2 Sat 100 - 99   (POST)  - 81   (POST) O2 Sat 99 98% on Ra 99   Distance in Meters 386.58 - 1158.24       OTHER IMAGING:  CXR (6/17/21) clear  CT 5/21/21 1. Irregular pulmonary nodule in the left upper lobe measuring 2 cm, suspicious for primary pulmonary malignancy. 2. Additional 6 mm left upper lobe pulmonary nodule. 3. Severe calcific atherosclerosis of the coronary arteries and abdominal aorta. No aneurysm. 4. Cardiomegaly. 5. No acute abnormality within the chest, abdomen, or pelvis.       LABORATORY RESULTS:     Labs Latest Ref Rng & Units 4/28/2022 4/27/2022 4/26/2022 4/25/2022 4/25/2022 4/25/2022 4/24/2022   WBC 4.1 - 11.1 K/uL 4.8 5.0 4.7 6.5 5.4 5.0 6.1   RBC 4.10 - 5.70 M/uL 2.91(L) 2.99(L) 2.96(L) 3.29(L) 2.96(L) 2.61(L) 2.82(L)   Hemoglobin 12.1 - 17.0 g/dL 7. 5(L) 7. 7(L) 7. 8(L) 8.6(L) 7. 6(L) 6. 8(L) 7. 5(L)   Hematocrit 36.6 - 50.3 % 24. 2(L) 25. 0(L) 24. 8(L) 27. 6(L) 24. 5(L) 22. 0(L) 23. 9(L)   MCV 80.0 - 99.0 FL 83.2 83.6 83.8 83.9 82.8 84.3 84.8   Platelets 438 - 354 K/uL 261 257 259 269 249 222 248   Lymphocytes 12 - 49 % - - - - - - -   Monocytes 5 - 13 % - - - - - - -   Eosinophils 0 - 7 % - - - - - - -   Basophils 0 - 1 % - - - - - - -   Bands 0 - 6 % - - - - - - -   Albumin 3.5 - 5.0 g/dL 2. 8(L) 2. 9(L) 2. 7(L) 3.0(L) - 2. 7(L) 2. 6(L)   Calcium 8.5 - 10.1 MG/DL 8. 2(L) 8.6 8.2(L) 8.0(L) - 7. 6(L) 8.0(L)   Glucose 65 - 100 mg/dL 93 92 109(H) 157(H) - 103(H) 105(H)   BUN 6 - 20 MG/DL 26(H) 26(H) 23(H) 21(H) - 19 22(H)   Creatinine 0.70 - 1.30 MG/DL 1.08 0.96 0.94 0.90 - 0.84 0.83   Sodium 136 - 145 mmol/L 134(L) 135(L) 133(L) 132(L) - 133(L) 134(L)   Potassium 3.5 - 5.1 mmol/L 4.0 3.9 4.0 4.1 - 3.8 4.3   TSH 0.36 - 3.74 uIU/mL - - - - - - -   LDH 85 - 241 U/L 188 169 190 - - 169 218   Some recent data might be hidden     Lab Results   Component Value Date/Time    TSH 2.26 04/10/2022 03:00 AM    TSH 1.68 03/01/2022 11:50 AM    TSH 1.47 05/26/2021 10:44 PM    TSH 1.97 05/20/2021 04:28 PM    TSH 1.41 02/09/2021 03:05 PM    TSH 1.740 08/14/2018 09:58 AM    TSH 1.710 10/18/2017 12:00 AM         HISTORY:  PAST MEDICAL HISTORY:  Past Medical History:   Diagnosis Date    CAD (coronary artery disease) '90 '97, '13 x 2    [place], code [place] in 2013 at [facility] [place]    Calculus of kidney     Colonic polyps     Congestive heart failure, unspecified     Diabetes ([facility] [address]     Gastritis     Hypercholesterolemia     Sleep apnea        PAST SURGICAL HISTORY:  Past Surgical History:   Procedure Laterality Date    COLONOSCOPY  04/06/2011    16, due 21    COLONOSCOPY N/A 3/2/2022    COLONOSCOPY    :- performed by Aleksandr Baxter MD at [facility]. [address], COLON, DIAGNOSTIC      11, due 16    HX CORONARY ARTERY BYPASS GRAFT  8/22/12    x 4 vessel by Arabella Phillips    HX HEENT      LASIK    HX PACEMAKER PLACEMENT  1/30/13    OR CARDIAC SURG PROCEDURE UNLIST  2012    x 4 vessels       FAMILY HISTORY:  Family History   Problem Relation Age of Onset    Heart Disease Mother         MI    Heart Disease Father         CAD & PVD    Lung Disease Father     Cancer Father         lung    Diabetes Maternal Grandmother     Heart Disease Other         CAD    Stroke Sister        SOCIAL HISTORY:  Social History     Socioeconomic History    Marital status:    Tobacco Use    Smoking status: Never Smoker    Smokeless tobacco: Never Used   Vaping Use    Vaping Use: Never used   Substance and Sexual Activity    Alcohol use:  Yes     Alcohol/week: 0.0 standard drinks     Comment:  VERY RARE    Drug use: No       ALLERGY:  Allergies   Allergen Reactions    Oxycodone Anaphylaxis    Pcn [Penicillins] Hives        CURRENT MEDICATIONS:    Current Facility-Administered Medications:     magnesium oxide (MAG-OX) tablet 400 mg, 400 mg, Oral, DAILY, Emily Wong NP    bumetanide (BUMEX) tablet 2 mg, 2 mg, Oral, BID, Diana Wong NP    warfarin (COUMADIN) tablet 4 mg, 4 mg, Oral, ONCE, Emily Wong NP    sacubitriL-valsartan (ENTRESTO) 24-26 mg tablet 1 Tablet, 1 Tablet, Oral, Q12H, Diana Wong NP, 1 Tablet at 04/28/22 0944    amiodarone (CORDARONE) tablet 200 mg, 200 mg, Oral, DAILY, Emily Wong NP, 200 mg at 04/28/22 0945    DOBUTamine (DOBUTREX) 1,000 mg/250 mL (4,000 mcg/mL) infusion, 6 mcg/kg/min, IntraVENous, CONTINUOUS, Emily Wong NP, Last Rate: 7.5 mL/hr at 04/27/22 2106, 6 mcg/kg/min at 04/27/22 2106    ketorolac (TORADOL) injection 15 mg, 15 mg, IntraVENous, Q6H PRN, Millie Wong NP, 15 mg at 04/27/22 1537    sodium chloride (OCEAN) 0.65 % nasal squeeze bottle 2 Spray, 2 Spray, Both Nostrils, Q2H PRN, Danica Stewart, NP    docusate sodium (COLACE) capsule 100 mg, 100 mg, Oral, DAILY, Danica Stewart NP, 100 mg at 04/28/22 0943    eplerenone (INSPRA) tablet 25 mg, 25 mg, Oral, DAILY, Terry, Danica B, NP, 25 mg at 04/28/22 0946    potassium chloride SR (KLOR-CON 10) tablet 20 mEq, 20 mEq, Oral, DAILY, Terry, Danica B, NP, 20 mEq at 04/28/22 0944    guaiFENesin (ROBITUSSIN) 100 mg/5 mL oral liquid 100 mg, 100 mg, Oral, Q4H PRN, Olga Packer MD, 100 mg at 04/22/22 2314    polyethylene glycol (MIRALAX) packet 17 g, 17 g, Oral, DAILY, Kenroy JACOBSON, NP, 17 g at 04/26/22 0920    senna-docusate (PERICOLACE) 8.6-50 mg per tablet 1 Tablet, 1 Tablet, Oral, DAILY PRN, Kenroy JACOBSON, NP, 1 Tablet at 04/24/22 0913    oxymetazoline (AFRIN) 0.05 % nasal spray 2 Spray, 2 Spray, Both Nostrils, BID PRN, Kenroy JACOBSON NP, 2 [place] at 04/24/22 1612    hydrALAZINE (APRESOLINE) tablet 50 mg, 50 mg, Oral, Q8H, Millie Wong NP, 50 mg at 04/28/22 0359    colchicine tablet 0.6 mg, 0.6 mg, Oral, DAILY, Polliard, Vernia Krabbe T, NP, 0.6 mg at 04/28/22 0945    alteplase (CATHFLO) 1 mg in sterile water (preservative free) 1 mL injection, 1 mg, InterCATHeter, PRN, Temi Ovalle MD, 1 mg at 04/25/22 2136    bisacodyL (DULCOLAX) suppository 10 mg, 10 mg, Rectal, DAILY PRN, Chito Melton MD, 10 mg at 04/12/22 1533    gabapentin (NEURONTIN) capsule 200 mg, 200 mg, Oral, TID, Polljanny, Vernia Krabbe T, NP, 200 mg at 04/28/22 0944    albumin human 25% (BUMINATE) solution 12.5 g, 12.5 g, IntraVENous, BID, Polliard, Vernia Krabbe T, NP, 12.5 g at 04/28/22 1038    famotidine (PEPCID) tablet 20 mg, 20 mg, Oral, Q12H, Chito Melton MD, 20 mg at 04/28/22 0945    mirtazapine (REMERON) tablet 7.5 mg, 7.5 mg, Oral, QHS, Danica Stewart, NP, 7.5 mg at 04/27/22 2106    lidocaine 4 % patch 1 Patch, 1 Patch, TransDERmal, Q24H, Danica Stewart, NP, 1 Patch at 04/27/22 1201    Warfarin NP/MD dosing, , Other, PRN, Zeus Stewartin B, NP    melatonin tablet 3 mg, 3 mg, Oral, QHS PRN, Antonio Zambrano MD, 3 mg at 04/20/22 2127    hydrALAZINE (APRESOLINE) 20 mg/mL injection 10 mg, 10 mg, IntraVENous, Q6H PRN, Brando Tran B, NP, 10 mg at 04/24/22 1415    aspirin chewable tablet 81 mg, 81 mg, Oral, DAILY, Brando Tran B, NP, 81 mg at 04/28/22 0945    hydrALAZINE (APRESOLINE) 20 mg/mL injection 5 mg, 5 mg, IntraVENous, Q6H PRN, Brando Tran B, NP, 5 mg at 04/13/22 8299    sodium chloride (NS) flush 5-40 mL, 5-40 mL, IntraVENous, Q8H, Polljuan migueld Millie T, NP, 10 mL at 04/28/22 5575    sodium chloride (NS) flush 5-40 mL, 5-40 mL, IntraVENous, PRN, Polljanny, [place]virada Kiespinoza T, NP, 10 mL at 04/24/22 0914    0.9% sodium chloride infusion, 9 mL/hr, IntraVENous, CONTINUOUS, Diana Wong NP, Last Rate: 3 mL/hr at 04/20/22 0513, 3 mL/hr at 04/20/22 0513    acetaminophen (TYLENOL) tablet 650 mg, 650 mg, Oral, Q6H PRN, Pernell Wong NP, 650 mg at 04/27/22 0454    naloxone (NARCAN) injection 0.4 mg, 0.4 mg, IntraVENous, PRN, Pernell Wong NP    [facility]) injection 4 mg, 4 mg, IntraVENous, Q4H PRN, Marvin Oneda Kick T, NP, 4 mg at 04/11/22 2139    albuterol-ipratropium (DUO-NEB) 2.5 MG-0.5 MG/3 ML, 3 mL, Nebulization, Q6H PRN, Pernell Wong NP    glucose chewable tablet 16 g, 4 Tablet, Oral, PRN, Pernell Wong NP    glucagon (GLUCAGEN) injection 1 mg, 1 mg, IntraMUSCular, PRN, Pernell Wong NP    insulin lispro (HUMALOG) injection, , SubCUTAneous, AC&HS, Diana Wong NP, 2 Units at 04/24/22 1310    sucralfate (CARAFATE) tablet 1 g, 1 g, Oral, AC&HS, Diana Wong NP, 1 g at 04/28/22 9955    0.45% sodium chloride infusion, 10 mL/hr, IntraVENous, CONTINUOUS, Taya Najera MD, Stopped at 04/10/22 0730    fentaNYL citrate (PF) injection 25 mcg, 25 mcg, IntraVENous, Q2H PRN, Pernell Wong NP, 25 mcg at 04/25/22 1251    PATIENT CARE TEAM:  Patient Care Team:  Leida Molina MD as PCP - General (Internal Medicine)  Leida Molina MD as PCP - [facility] Provider  Wil Kapoor MD as Physician (Cardiology)  Stefani Zarate MD as Physician (Gastroenterology)  Cris Chua MD as Physician (Orthopedic Surgery)  Cresencio Peres MD as Physician (Ophthalmology)  Merline Eden, MD (Cardiology)  Army Lionel MD (Cardiology)       Thank you for allowing us to participate in this patient's care.       EARNESTINE Lizama [address], [address]  Phone: [phone]

## 2022-04-28 NOTE — PROGRESS NOTES
1930: Bedside shift change report given to Anamaria Arguelles, RN (oncoming nurse) by Birdie Gilman, KIRK (offgoing nurse). Report included the following information SBAR, Kardex, Intake/Output, MAR, and Recent Results. 0630: pt ambulated to bathroom and complete CHG bath with moderate assistance. Stood at sink for 15 minutes. Patient denies SOB and dizziness. 0730: Bedside shift change report given to Rocío Briggs (oncoming nurse) by Anamaria Arguelles, RN (offgoing nurse). Report included the following information SBAR, Kardex, Intake/Output, MAR and Recent Results. 0745: radiologist notified this RN of small R pneumothorax. Notified Favian Daneils NP. Notified HF NP. No new orders at this time. Problem: Falls - Risk of  Goal: *Absence of Falls  Description: Document Yvette Perez Fall Risk and appropriate interventions in the flowsheet.   Outcome: Progressing Towards Goal  Note: Fall Risk Interventions:  Mobility Interventions: Bed/chair exit alarm,Patient to call before getting OOB         Medication Interventions: Patient to call before getting OOB,Teach patient to arise slowly    Elimination Interventions: Call light in reach,Bed/chair exit alarm       Problem: LVAD Post-op Day 8 to day 14  Goal: Activity/Safety  Outcome: Progressing Towards Goal     Problem: LVAD Post-op Day 8 to day 14  Goal: Nutrition/Diet  Outcome: Progressing Towards Goal     Problem: LVAD Post-op Day 8 to day 14  Goal: Medications  Outcome: Progressing Towards Goal     Problem: LVAD Post-op Day 8 to day 14  Goal: *Hemodynamically stable  Outcome: Progressing Towards Goal     Problem: LVAD Post-op Day 8 to day 14  Goal: *Tolerating diet  Outcome: Progressing Towards Goal

## 2022-04-28 NOTE — PROGRESS NOTES
0730: Bedside shift change report given to Nesha Rosenberg (oncoming nurse) by Tanisha Rodriguez (offgoing nurse). Report included the following information SBAR, Kardex, Intake/Output, MAR and Recent Results. 1615: LVAD dressing changed completed by wife with RN at bedside. 1930: Bedside shift change report given to Maile Lozada (oncoming nurse) by Nesha Rosenberg (offgoing nurse). Report included the following information SBAR, Kardex, OR Summary, Intake/Output, MAR and Recent Results.

## 2022-04-28 NOTE — PROGRESS NOTES
Problem: Self Care Deficits Care Plan (Adult)  Goal: *Acute Goals and Plan of Care (Insert Text)  Description: FUNCTIONAL STATUS PRIOR TO ADMISSION: pt lives with wife, independent prior    HOME SUPPORT: The patient lived with wife but did not require assist.    Occupational Therapy Goals  Goals reviewed and upgraded as follows 4/25/2022  1. Patient will perform ADLs standing 5 mins without fatigue or LOB with modified independence within 7 day(s). upgraded  2. Patient will perform lower body ADLs with modified independence using legs crossed method within 7 day(s). upgraded  3. Patient will perform upper body ADLs with modified independence within 7 day(s). upgraded  4. Patient will perform toilet transfers with modified independence within 7 day(s). upgraded  5. Patient will perform all aspects of toileting with modified independence within 7 day(s). upgraded  6. Patient will participate in cardiac/sternal upper extremity therapeutic exercise/activities to increase independence with ADLs with supervision/set-up for 5 minutes within 7 day(s). continue  7. Patient will perform VAD switchover routine as part of ADL routine (including batteries<>batteries, battery clip<>battery clip, mock SC<>SC) with stand by/set-up assistance within 7 days. upgraded     Goals reviewed and remain appropriate 4/18/2022  Initiated 4/8/2022  1. Patient will perform ADLs standing 5 mins without fatigue or LOB with supervision/set-up within 7 day(s). 2.  Patient will perform lower body ADLs with minimal assistance/contact guard assist within 7 day(s). 3.  Patient will perform upper body ADLs with minimal assistance/contact guard assist within 7 day(s). 4.  Patient will perform toilet transfers with minimal assistance/contact guard assist within 7 day(s). 5.  Patient will perform all aspects of toileting with minimal assistance/contact guard assist within 7 day(s).   6.  Patient will participate in cardiac/sternal upper extremity therapeutic exercise/activities to increase independence with ADLs with supervision/set-up for 5 minutes within 7 day(s). 7.  Patient will perform VAD switchover routine as part of ADL routine (including batteries<>batteries, battery clip<>battery clip, mock SC<>SC) with minimal assistance/contact guard assist within 7 days. Outcome: Progressing Towards Goal   OCCUPATIONAL THERAPY TREATMENT  Patient: Wanda Cloud (84 y.o. male)  Date: 4/28/2022  Diagnosis: HFrEF (heart failure with reduced ejection fraction) ([facility]) [I50.20] <principal problem not specified>  Procedure(s) (LRB):  REDO STERNOTOMY; RIGHT GROIN CUTDOWN FOR CANNULATION;  INSERTION OF LEFT VENTRICULAR ASSIST DEVICE (HMIII); AORTIC VALVE CLOSURE CHEL BY DR. Sobia Edwards. (N/A) 23 Days Post-Op  Precautions: Fall (mindful movement, LVAD)  Chart, occupational therapy assessment, plan of care, and goals were reviewed. ASSESSMENT  Patient continues with skilled OT services and is progressing towards goals. Patient received sitting in recliner and agreeable to working with therapy. Nursing student present throughout and patient provided teach back of education by verbalizing and demonstrating to teach nursing student. Patient able to name and locate all parts of LVAD except mixed up white power cord and driveline, requiring cueing, see grid below for details. Patient performed switchover to battery power from power module and performed self test independently, see below for switchover details. Reviewed with patient contents of emergency bag and independent for 4/4 and stating to have with him at all time, able to switch batteries in simulated low battery scenario. Patient able to verbalize steps with minimal verbal cueing if there is a malfunction in a power lead to battery power. After reviewing above, patient's breakfast tray delivered to room and patient requesting to eat so mobility and additional self care tasks deferred at this time. Overall patient did well with teach back but does still requiring minimal verbal cueing and incorrectly identified driveline cord at this time. Patient would benefit from skilled OT services during admission to improve independence with self care and functional mobility/transfers. Recommend discharge to [facility] at this time. Patient is verbalizing understanding of mindful-based movements (\"move in the tube\") principles of keeping UEs proximal to ribcage to prevent lateral pull on the sternum during load-bearing activities with verbal cues required for compliance. Current Level of Function Impacting Discharge (ADLs): minimal verbal cueing for emergency scenarios and switchover to battery power    Other factors to consider for discharge: prior level of function, family assist, chest tube remains in place         PLAN :  Patient continues to benefit from skilled intervention to address the above impairments. Continue treatment per established plan of care to address goals. Recommend with staff: up to chair 3x/day for meals, functional mobility to and from bathroom for toileting    Recommend next OT session: LVAD community outing scheduled for tomorrow at 1PM    Recommendation for discharge: (in order for the patient to meet his/her long term goals)  Occupational therapy at least 2 days/week in the home AND ensure assist and/or supervision for safety with LVAD maintenance    This discharge recommendation:  Has been made in collaboration with the attending provider and/or case management    IF patient discharges home will need the following DME: patient owns DME required for discharge       SUBJECTIVE:   Patient stated This one here is the driveline.  When pointing to while power lead, patient corrected and verbalized understanding.     OBJECTIVE DATA SUMMARY:   Cognitive/Behavioral Status:  Neurologic State: Alert                   Functional Mobility and Transfers for ADLs:  Bed Mobility:       Transfers: Balance:  Sitting: Intact    ADL Intervention:                      Upper Body Dressing Assistance  Front Opened Shirt: Supervision (don vest)      Patient recalled VAD equipment in prep for ADL routine with verbal cueing assist:   Item Correct Identification Location Function Comments    x x x    Emergency Bag x x x    Battery Clips x x x    Display Module x x x    Power Module x x x    Battery  x x x    Drive Line    Required cueing, mixed up with white power lead   Power Leads    Able to identify black power lead, mixed up white lead with driveline   Patient Vest x x x      Patient demonstrated switchover from power module to battery in prep for ADL routine with minimal verbal cueing. Demonstrated the following tasks:    Independent Verbal cues Physical assistance Comments   Check PM & SC x      Ensure  clipped onto stabilization belt x      Remove front (green lighted) batteries from , place back batteries into front slots   x Patient able to verbalize and direct care   Check batteries x      Position batteries into battery clips x      Don holster vest x      Disconnect power leads x      Insert power lead into battery clip x   Initially attempting white connector to power module to battery but able to self correct   [employer] batteries in holster x      Secure batteries with Velcro and clips x                  Patient instructed and educated on mindful movement principles based on Move in [place] concept to include maintaining bilateral elbows close to rib cage when performing any load-bearing activity such as getting in/out of bed, pushing up from a chair, opening a door, or lifting a box. Patient was given a handout with diagrams of each correct/incorrect method of performing each of the above tasks.    Patient instructed on the ability to utilize upper extremities outside the tube when doing any non-load bearing activity such as washing hair/body, brushing teeth, retrieving clothing items, or scratching your back. Patient encouraged to also perform upper extremity exercises \"outside of the tube\" to prevent scar tissue formation around sternal incision site. Pain:  None reported    Activity Tolerance:   Good    After treatment patient left in no apparent distress:   Sitting in chair and Call bell within reach    COMMUNICATION/COLLABORATION:   The patients plan of care was discussed with: Physical therapist and Registered nurse.      Nita South OTR/L  Time Calculation: 23 mins

## 2022-04-28 NOTE — PROGRESS NOTES
[address] [facility] in [place], [place]     LVAD Education:   Stopped by to review LVAD education. Patient had visitors. Will try to stop in later.      Dakotah Plaza RN  VAD Coordinator

## 2022-04-28 NOTE — PROGRESS NOTES
Problem: Mobility Impaired (Adult and Pediatric)  Goal: *Acute Goals and Plan of Care (Insert Text)  Description: FUNCTIONAL STATUS PRIOR TO ADMISSION: Patient was independent and active without use of DME.    HOME SUPPORT PRIOR TO ADMISSION: The patient lived with his wife but did not require assist.    Physical Therapy Goals- Continue goals with exception of #7 for next 7 days 4/22/2022  Weekly Reassessment 4/15/2022 (goals progressed)  1. Patient will move from supine to sit and sit to supine , scoot up and down, and roll side to side in bed with head of bed flat with supervision/set-up within 7 days. 2.  Patient will perform sit to/from stand from chair height with supervision/set-up within 7 days. 3.  Patient will ambulate 300 feet stand by assist within 7 days. 4.  Patient will ascend/descend 6 stairs with handrail(s) with minimal assistance/contact guard assist within 14 days. 5.  Patient will perform cardiac exercises per protocol with supervision/set-up within 7 days. 6.  Patient will verbally and functionally recall mindful movement precautions within 7 days. 7.  Patient will perform power exchange for power module to/from battery with stand by assist within 7 days. -MET currently independent 4/22/2022    Initiated 4/8/2022  1. Patient will move from supine to sit and sit to supine , scoot up and down, and roll side to side in bed with supervision/set-up within 7 days. 2.  Patient will perform sit to/from stand with supervision/set-up within 7 days. 3.  Patient will ambulate 50 feet with least restrictive assistive device and minimal assistance/contact guard assist within 7 days. 4.  Patient will ascend/descend 6 stairs with handrail(s) with minimal assistance/contact guard assist within 14 days. 5.  Patient will perform cardiac exercises per protocol with supervision/set-up within 7 days. 6.  Patient will verbally and functionally recall 3/3 sternal precautions within 7 days.   7. Patient will perform power exchange for power module to/from battery with minimal assistance within 7 days. Outcome: Progressing Towards Goal  PHYSICAL THERAPY TREATMENT  Patient: Aimee Mackenzie (7 y.o. male)  Date: 4/28/2022  Diagnosis: HFrEF (heart failure with reduced ejection fraction) ([facility]) [I50.20] <principal problem not specified>  Procedure(s) (LRB):  REDO STERNOTOMY; RIGHT GROIN CUTDOWN FOR CANNULATION;  INSERTION OF LEFT VENTRICULAR ASSIST DEVICE (HMIII); AORTIC VALVE CLOSURE CHEL BY DR. Amy Kellogg. (N/A) 23 Days Post-Op  Precautions: Fall,Sternal (LVAD )  Chart, physical therapy assessment, plan of care and goals were reviewed. ASSESSMENT  Patient continues with skilled PT services and is progressing towards goals. Patient received seated in chair, agreeable to therapy. Noted small R pneumothorax on chest xray this morning, however SpO2 stable throughout on room air and patient no more SOB/dyspneic than usual. Ambulated with nearly intact balance, only one LOB but self corrected. Plan for outing tomorrow at 1 pm. Recommend HHPT. Current Level of Function Impacting Discharge (mobility/balance): supervision-CGA    Other factors to consider for discharge: home on dobut, may need drain          PLAN :  Patient continues to benefit from skilled intervention to address the above impairments. Continue treatment per established plan of care. to address goals. Recommendation for discharge: (in order for the patient to meet his/her long term goals)  Physical therapy at least 2 days/week in the home     This discharge recommendation:  Has been made in collaboration with the attending provider and/or case management    IF patient discharges home will need the following DME: to be determined (TBD)       SUBJECTIVE:   Patient stated I need to do this more than once a day.  re: walking, encouraged to ask for assist to ambulate later this evening    OBJECTIVE DATA SUMMARY:   Critical Behavior:  Neurologic State: Alert  Orientation Level: Oriented X4  Cognition: Appropriate decision making  Functional Mobility Training:    Transfers:  Sit to Stand: Supervision  Stand to Sit: Supervision  Bed to Chair: Supervision  Balance:  Sitting: Intact  Standing: Impaired; Without support  Standing - Static: Good  Standing - Dynamic : Good;Fair  Ambulation/Gait Training:  Distance (ft): 250 Feet (ft)  Assistive Device: Gait belt  Ambulation - Level of Assistance: Contact guard assistance;Stand-by assistance  Gait Abnormalities: Decreased step clearance; Path deviations  Base of Support: Widened  Speed/Charlene: Pace decreased (<100 feet/min)  Step Length: Right shortened;Left shortened    Activity Tolerance:   Good, SpO2 stable on RA, and observed SOB with activity    After treatment patient left in no apparent distress:   Sitting in chair and Call bell within reach, chair alarm on    COMMUNICATION/COLLABORATION:   The patients plan of care was discussed with: Occupational therapist and Registered nurse.      Marycarmen Herrera, PT, DPT   Time Calculation: 20 mins

## 2022-04-28 NOTE — PROGRESS NOTES
Transitions of Care Plan   RUR: 21% - high   Clinical Update: pneumothorax; community outing tomorrow; d/c mid next week   Consults: [facility]; Therapy   Baseline: independent without DME; resides w wife   Barrier(s) to Discharge: medical   Disposition: [facility]   Estimated Discharge Date: 5/4/22    Clinical update per chart review and/or patient discussed during Interdisciplinary Rounds:    Patient is not medically stable for discharge due to ongoing medical needs. CM continues to follow treatment plan for medical progress and disposition needs. CM sent home inotrope orders to [facility] for review. Received confirmation of acceptance of infusion orders. CM advised tentative date for discharge is mid-next week.      Disposition:  [facility], [address]  Available via [facility] or

## 2022-04-29 ENCOUNTER — APPOINTMENT (OUTPATIENT)
Dept: GENERAL RADIOLOGY | Age: 77
DRG: 001 | End: 2022-04-29
Attending: NURSE PRACTITIONER
Payer: MEDICARE

## 2022-04-29 LAB
ALBUMIN SERPL-MCNC: 3 G/DL (ref 3.5–5)
ALBUMIN/GLOB SERPL: 1.1 {RATIO} (ref 1.1–2.2)
ALP SERPL-CCNC: 157 U/L (ref 45–117)
ALT SERPL-CCNC: 26 U/L (ref 12–78)
ANION GAP SERPL CALC-SCNC: 8 MMOL/L (ref 5–15)
AST SERPL-CCNC: 71 U/L (ref 15–37)
BILIRUB SERPL-MCNC: 0.7 MG/DL (ref 0.2–1)
BNP SERPL-MCNC: 2322 PG/ML
BUN SERPL-MCNC: 23 MG/DL (ref 6–20)
BUN/CREAT SERPL: 23 (ref 12–20)
CALCIUM SERPL-MCNC: 7.9 MG/DL (ref 8.5–10.1)
CHLORIDE SERPL-SCNC: 99 MMOL/L (ref 97–108)
CO2 SERPL-SCNC: 29 MMOL/L (ref 21–32)
CREAT SERPL-MCNC: 1 MG/DL (ref 0.7–1.3)
ERYTHROCYTE [DISTWIDTH] IN BLOOD BY AUTOMATED COUNT: 19.6 % (ref 11.5–14.5)
GLOBULIN SER CALC-MCNC: 2.8 G/DL (ref 2–4)
GLUCOSE BLD STRIP.AUTO-MCNC: 108 MG/DL (ref 65–117)
GLUCOSE BLD STRIP.AUTO-MCNC: 144 MG/DL (ref 65–117)
GLUCOSE BLD STRIP.AUTO-MCNC: 154 MG/DL (ref 65–117)
GLUCOSE BLD STRIP.AUTO-MCNC: 180 MG/DL (ref 65–117)
GLUCOSE SERPL-MCNC: 117 MG/DL (ref 65–100)
HCT VFR BLD AUTO: 26.6 % (ref 36.6–50.3)
HGB BLD-MCNC: 8.4 G/DL (ref 12.1–17)
INR PPP: 2.7 (ref 0.9–1.1)
LDH SERPL L TO P-CCNC: 176 U/L (ref 85–241)
MAGNESIUM SERPL-MCNC: 2.2 MG/DL (ref 1.6–2.4)
MCH RBC QN AUTO: 26 PG (ref 26–34)
MCHC RBC AUTO-ENTMCNC: 31.6 G/DL (ref 30–36.5)
MCV RBC AUTO: 82.4 FL (ref 80–99)
NRBC # BLD: 0 K/UL (ref 0–0.01)
NRBC BLD-RTO: 0 PER 100 WBC
PLATELET # BLD AUTO: 299 K/UL (ref 150–400)
PMV BLD AUTO: 9.4 FL (ref 8.9–12.9)
POTASSIUM SERPL-SCNC: 3.9 MMOL/L (ref 3.5–5.1)
PROT SERPL-MCNC: 5.8 G/DL (ref 6.4–8.2)
PROTHROMBIN TIME: 26.5 SEC (ref 9–11.1)
RBC # BLD AUTO: 3.23 M/UL (ref 4.1–5.7)
SERVICE CMNT-IMP: ABNORMAL
SERVICE CMNT-IMP: NORMAL
SODIUM SERPL-SCNC: 136 MMOL/L (ref 136–145)
WBC # BLD AUTO: 5.9 K/UL (ref 4.1–11.1)

## 2022-04-29 PROCEDURE — 85610 PROTHROMBIN TIME: CPT

## 2022-04-29 PROCEDURE — 74011250637 HC RX REV CODE- 250/637: Performed by: NURSE PRACTITIONER

## 2022-04-29 PROCEDURE — 80053 COMPREHEN METABOLIC PANEL: CPT

## 2022-04-29 PROCEDURE — 74011250637 HC RX REV CODE- 250/637: Performed by: STUDENT IN AN ORGANIZED HEALTH CARE EDUCATION/TRAINING PROGRAM

## 2022-04-29 PROCEDURE — 2709999900 HC NON-CHARGEABLE SUPPLY

## 2022-04-29 PROCEDURE — 83615 LACTATE (LD) (LDH) ENZYME: CPT

## 2022-04-29 PROCEDURE — 36415 COLL VENOUS BLD VENIPUNCTURE: CPT

## 2022-04-29 PROCEDURE — 74011250636 HC RX REV CODE- 250/636: Performed by: NURSE PRACTITIONER

## 2022-04-29 PROCEDURE — 71045 X-RAY EXAM CHEST 1 VIEW: CPT

## 2022-04-29 PROCEDURE — 97530 THERAPEUTIC ACTIVITIES: CPT

## 2022-04-29 PROCEDURE — 83735 ASSAY OF MAGNESIUM: CPT

## 2022-04-29 PROCEDURE — 99232 SBSQ HOSP IP/OBS MODERATE 35: CPT | Performed by: NURSE PRACTITIONER

## 2022-04-29 PROCEDURE — 82962 GLUCOSE BLOOD TEST: CPT

## 2022-04-29 PROCEDURE — 83880 ASSAY OF NATRIURETIC PEPTIDE: CPT

## 2022-04-29 PROCEDURE — 74011250637 HC RX REV CODE- 250/637: Performed by: INTERNAL MEDICINE

## 2022-04-29 PROCEDURE — 77030037442 HC TY LVAD MGMT SYST CMP-B

## 2022-04-29 PROCEDURE — 97116 GAIT TRAINING THERAPY: CPT

## 2022-04-29 PROCEDURE — 93005 ELECTROCARDIOGRAM TRACING: CPT

## 2022-04-29 PROCEDURE — 85027 COMPLETE CBC AUTOMATED: CPT

## 2022-04-29 PROCEDURE — 97535 SELF CARE MNGMENT TRAINING: CPT

## 2022-04-29 PROCEDURE — 93750 INTERROGATION VAD IN PERSON: CPT | Performed by: NURSE PRACTITIONER

## 2022-04-29 PROCEDURE — P9047 ALBUMIN (HUMAN), 25%, 50ML: HCPCS | Performed by: NURSE PRACTITIONER

## 2022-04-29 PROCEDURE — 65660000001 HC RM ICU INTERMED STEPDOWN

## 2022-04-29 PROCEDURE — 74011000250 HC RX REV CODE- 250: Performed by: NURSE PRACTITIONER

## 2022-04-29 RX ORDER — SPIRONOLACTONE 25 MG/1
12.5 TABLET ORAL DAILY
Status: DISCONTINUED | OUTPATIENT
Start: 2022-04-29 | End: 2022-04-29

## 2022-04-29 RX ORDER — GABAPENTIN 100 MG/1
100 CAPSULE ORAL 2 TIMES DAILY
Status: DISCONTINUED | OUTPATIENT
Start: 2022-04-29 | End: 2022-04-30

## 2022-04-29 RX ORDER — WARFARIN 4 MG/1
4 TABLET ORAL ONCE
Status: COMPLETED | OUTPATIENT
Start: 2022-04-29 | End: 2022-04-29

## 2022-04-29 RX ADMIN — SUCRALFATE 1 G: 1 TABLET ORAL at 21:57

## 2022-04-29 RX ADMIN — HYDRALAZINE HYDROCHLORIDE 50 MG: 50 TABLET, FILM COATED ORAL at 04:22

## 2022-04-29 RX ADMIN — MIRTAZAPINE 7.5 MG: 15 TABLET, FILM COATED ORAL at 21:57

## 2022-04-29 RX ADMIN — Medication 400 MG: at 09:57

## 2022-04-29 RX ADMIN — SODIUM CHLORIDE, PRESERVATIVE FREE 10 ML: 5 INJECTION INTRAVENOUS at 16:32

## 2022-04-29 RX ADMIN — HYDRALAZINE HYDROCHLORIDE 50 MG: 50 TABLET, FILM COATED ORAL at 12:25

## 2022-04-29 RX ADMIN — BUMETANIDE 2 MG: 1 TABLET ORAL at 16:32

## 2022-04-29 RX ADMIN — ASPIRIN 81 MG CHEWABLE TABLET 81 MG: 81 TABLET CHEWABLE at 09:57

## 2022-04-29 RX ADMIN — ALBUMIN (HUMAN) 12.5 G: 0.25 INJECTION, SOLUTION INTRAVENOUS at 18:18

## 2022-04-29 RX ADMIN — FAMOTIDINE 20 MG: 20 TABLET ORAL at 21:57

## 2022-04-29 RX ADMIN — DOCUSATE SODIUM 100 MG: 100 CAPSULE, LIQUID FILLED ORAL at 09:56

## 2022-04-29 RX ADMIN — GABAPENTIN 200 MG: 100 CAPSULE ORAL at 10:00

## 2022-04-29 RX ADMIN — SUCRALFATE 1 G: 1 TABLET ORAL at 16:32

## 2022-04-29 RX ADMIN — AMIODARONE HYDROCHLORIDE 200 MG: 200 TABLET ORAL at 09:59

## 2022-04-29 RX ADMIN — ALBUMIN (HUMAN) 12.5 G: 0.25 INJECTION, SOLUTION INTRAVENOUS at 08:36

## 2022-04-29 RX ADMIN — POLYETHYLENE GLYCOL 3350 17 G: 17 POWDER, FOR SOLUTION ORAL at 10:00

## 2022-04-29 RX ADMIN — SUCRALFATE 1 G: 1 TABLET ORAL at 12:25

## 2022-04-29 RX ADMIN — COLCHICINE 0.6 MG: 0.6 TABLET, FILM COATED ORAL at 09:57

## 2022-04-29 RX ADMIN — GUAIFENESIN 100 MG: 200 SOLUTION ORAL at 01:14

## 2022-04-29 RX ADMIN — ACETAMINOPHEN 650 MG: 325 TABLET ORAL at 21:57

## 2022-04-29 RX ADMIN — GABAPENTIN 100 MG: 100 CAPSULE ORAL at 18:17

## 2022-04-29 RX ADMIN — EPLERENONE 25 MG: 25 TABLET, FILM COATED ORAL at 10:01

## 2022-04-29 RX ADMIN — KETOROLAC TROMETHAMINE 15 MG: 30 INJECTION, SOLUTION INTRAMUSCULAR; INTRAVENOUS at 14:02

## 2022-04-29 RX ADMIN — SUCRALFATE 1 G: 1 TABLET ORAL at 08:36

## 2022-04-29 RX ADMIN — WARFARIN SODIUM 4 MG: 4 TABLET ORAL at 18:18

## 2022-04-29 RX ADMIN — SODIUM CHLORIDE, PRESERVATIVE FREE 10 ML: 5 INJECTION INTRAVENOUS at 08:36

## 2022-04-29 RX ADMIN — SACUBITRIL AND VALSARTAN 1 TABLET: 24; 26 TABLET, FILM COATED ORAL at 09:59

## 2022-04-29 RX ADMIN — BUMETANIDE 2 MG: 1 TABLET ORAL at 09:58

## 2022-04-29 RX ADMIN — HYDRALAZINE HYDROCHLORIDE 50 MG: 50 TABLET, FILM COATED ORAL at 21:57

## 2022-04-29 RX ADMIN — SACUBITRIL AND VALSARTAN 1 TABLET: 24; 26 TABLET, FILM COATED ORAL at 21:57

## 2022-04-29 RX ADMIN — FAMOTIDINE 20 MG: 20 TABLET ORAL at 09:56

## 2022-04-29 RX ADMIN — SODIUM CHLORIDE, PRESERVATIVE FREE 10 ML: 5 INJECTION INTRAVENOUS at 21:56

## 2022-04-29 RX ADMIN — DOBUTAMINE HYDROCHLORIDE 6 MCG/KG/MIN: 400 INJECTION INTRAVENOUS at 10:46

## 2022-04-29 NOTE — PROGRESS NOTES
Cardiac Surgery Care Coordinator- Met with [address] [place] [address], reviewed plan of care and discussed upcoming discharge plan, Reinforced sternal precautions and encouraged continued use of the incentive spirometer. Reviewed Coumadin education and provided him with the Coumadin educational pamphlet. Discussed INR goal and current INR results. Dosing schedule and dietary consideration. Will continue to follow for educational and emotional needs.  Doris Salgado RN

## 2022-04-29 NOTE — PROGRESS NOTES
[facility] in [place], [place]     LVAD Education:     Met with patient and family, Wife and daughter, for LVAD education. Reviewed the following:     LVAD terms and functions- Patient named each component of the LVAD system. - Patient able to name and discuss all component of the HM3  display, lighted symbols and their meanings, and function of each button. Reminded patient how to review last 6 alarms. Daily Maintenance - reviewed with patient to complete self test, check driveline and power leads, check daily weight and document on LVAD FLowsheet along with LVAD parameters, and to rotate batteries. Discussed timeline for showering and driving. Reviewed Coumadin teaching. Reviewed post discharge follow up with frequency of clinic visits, Home Health visits, Cardiac Rehab and testing to expect. Continued Heart Failure Care- Educated patient and family on the importance of continued heart healthy lifestyle including heart healthy diet, sodium monitoring, daily weight monitoring, taking medications as prescribed, and following up as recommended with health care providers. Patient and family have no further question. Encouraged patient to complete his workbook over the weekend.          Sukhjinder Caballero RN  VAD Coordinator

## 2022-04-29 NOTE — PROGRESS NOTES
Problem: Falls - Risk of  Goal: *Absence of Falls  Description: Document Ary Gannon Fall Risk and appropriate interventions in the flowsheet. Outcome: Progressing Towards Goal  Note: Fall Risk Interventions:  Mobility Interventions: Bed/chair exit alarm         Medication Interventions: Bed/chair exit alarm,Patient to call before getting OOB,Teach patient to arise slowly    Elimination Interventions: Bed/chair exit alarm,Call light in reach,Toileting schedule/hourly rounds              Problem: Diabetes Self-Management  Goal: *Disease process and treatment process  Description: Define diabetes and identify own type of diabetes; list 3 options for treating diabetes. Outcome: Progressing Towards Goal  Goal: *Incorporating nutritional management into lifestyle  Description: Describe effect of type, amount and timing of food on blood glucose; list 3 methods for planning meals. Outcome: Progressing Towards Goal  Goal: *Incorporating physical activity into lifestyle  Description: State effect of exercise on blood glucose levels. Outcome: Progressing Towards Goal  Goal: *Developing strategies to promote health/change behavior  Description: Define the ABC's of diabetes; identify appropriate screenings, schedule and personal plan for screenings. Outcome: Progressing Towards Goal  Goal: *Using medications safely  Description: State effect of diabetes medications on diabetes; name diabetes medication taking, action and side effects. Outcome: Progressing Towards Goal  Goal: *Monitoring blood glucose, interpreting and using results  Description: Identify recommended blood glucose targets  and personal targets. Outcome: Progressing Towards Goal  Goal: *Prevention, detection, treatment of acute complications  Description: List symptoms of hyper- and hypoglycemia; describe how to treat low blood sugar and actions for lowering  high blood glucose level.   Outcome: Progressing Towards Goal  Goal: *Prevention, detection and treatment of chronic complications  Description: Define the natural course of diabetes and describe the relationship of blood glucose levels to long term complications of diabetes. Outcome: Progressing Towards Goal  Goal: *Developing strategies to address psychosocial issues  Description: Describe feelings about living with diabetes; identify support needed and support network  Outcome: Progressing Towards Goal     Problem: Pressure Injury - Risk of  Goal: *Prevention of pressure injury  Description: Document Jl Scale and appropriate interventions in the flowsheet.   Outcome: Progressing Towards Goal  Note: Pressure Injury Interventions:  Sensory Interventions: Chair cushion    Moisture Interventions: Absorbent underpads,Minimize layers    Activity Interventions: Pressure redistribution bed/mattress(bed type),Assess need for specialty bed,Increase time out of bed    Mobility Interventions: Assess need for specialty bed,Pressure redistribution bed/mattress (bed type),HOB 30 degrees or less    Nutrition Interventions: Document food/fluid/supplement intake    Friction and Shear Interventions: Lift sheet,HOB 30 degrees or less,Apply protective barrier, creams and emollients                Problem: Patient Education: Go to Patient Education Activity  Goal: Patient/Family Education  Outcome: Progressing Towards Goal     Problem: Patient Education: Go to Patient Education Activity  Goal: Patient/Family Education  Outcome: Progressing Towards Goal     Problem: LVAD: Discharge Outcomes  Goal: *Hemodynamically stable  Outcome: Progressing Towards Goal  Goal: *Lungs clear or at baseline  Outcome: Progressing Towards Goal  Goal: *Optimal pain control at patient's stated goal  Outcome: Progressing Towards Goal  Goal: *Demonstrates progressive activity  Outcome: Progressing Towards Goal  Goal: *Tolerating diet  Outcome: Progressing Towards Goal  Goal: *LVAD parameters within set limits  Outcome: Progressing Towards Goal  Goal: *Verbalizes and demonstrates incision care  Outcome: Progressing Towards Goal  Goal: *Demonstrates effective coping  Outcome: Progressing Towards Goal  Goal: *LVAD drive line site without signs and symptoms of wound complications  Outcome: Progressing Towards Goal

## 2022-04-29 NOTE — PROGRESS NOTES
Problem: Self Care Deficits Care Plan (Adult)  Goal: *Acute Goals and Plan of Care (Insert Text)  Description: FUNCTIONAL STATUS PRIOR TO ADMISSION: pt lives with wife, independent prior    HOME SUPPORT: The patient lived with wife but did not require assist.    Occupational Therapy Goals  Goals reviewed and upgraded as follows 4/25/2022  1. Patient will perform ADLs standing 5 mins without fatigue or LOB with modified independence within 7 day(s). upgraded  2. Patient will perform lower body ADLs with modified independence using legs crossed method within 7 day(s). upgraded  3. Patient will perform upper body ADLs with modified independence within 7 day(s). upgraded  4. Patient will perform toilet transfers with modified independence within 7 day(s). upgraded  5. Patient will perform all aspects of toileting with modified independence within 7 day(s). upgraded  6. Patient will participate in cardiac/sternal upper extremity therapeutic exercise/activities to increase independence with ADLs with supervision/set-up for 5 minutes within 7 day(s). continue  7. Patient will perform VAD switchover routine as part of ADL routine (including batteries<>batteries, battery clip<>battery clip, mock SC<>SC) with stand by/set-up assistance within 7 days. upgraded     Goals reviewed and remain appropriate 4/18/2022  Initiated 4/8/2022  1. Patient will perform ADLs standing 5 mins without fatigue or LOB with supervision/set-up within 7 day(s). 2.  Patient will perform lower body ADLs with minimal assistance/contact guard assist within 7 day(s). 3.  Patient will perform upper body ADLs with minimal assistance/contact guard assist within 7 day(s). 4.  Patient will perform toilet transfers with minimal assistance/contact guard assist within 7 day(s). 5.  Patient will perform all aspects of toileting with minimal assistance/contact guard assist within 7 day(s).   6.  Patient will participate in cardiac/sternal upper extremity therapeutic exercise/activities to increase independence with ADLs with supervision/set-up for 5 minutes within 7 day(s). 7.  Patient will perform VAD switchover routine as part of ADL routine (including batteries<>batteries, battery clip<>battery clip, mock SC<>SC) with minimal assistance/contact guard assist within 7 days. Outcome: Progressing Towards Goal   OCCUPATIONAL THERAPY TREATMENT  Patient: Heidy Mccarthy (89 y.o. male)  Date: 4/29/2022  Diagnosis: HFrEF (heart failure with reduced ejection fraction) ([facility]) [I50.20] <principal problem not specified>  Procedure(s) (LRB):  REDO STERNOTOMY; RIGHT GROIN CUTDOWN FOR CANNULATION;  INSERTION OF LEFT VENTRICULAR ASSIST DEVICE (HMIII); AORTIC VALVE CLOSURE CHEL BY DR. Charles Vance. (N/A) 24 Days Post-Op  Precautions: Fall,Sternal (LVAD )  Chart, occupational therapy assessment, plan of care, and goals were reviewed. ASSESSMENT  Patient continues with skilled OT services and is progressing towards goals. Patient seen today for scheduled LVAD outing with patient's wife and daughter. Patient performed dressing sitting and standing at recliner and then patient and family performed a switchover to battery power with no cueing. Patient able to verbalize all components needed in emergency bag and patient's family independently brought for outing without cueing. See PT note for gait details. Reviewed 3/3 emergency scenarios with patient and family in visitor waiting area and family demonstrated steps for  failure, patient demonstrated low battery scenario and clip malfunction with good carryover of prior education. Patient did require CGA for sit -> stand from low chair in visitor waiting area. Patient would benefit from skilled OT services during admission to improve independence with self care and functional mobility/transfers. Recommend discharge to [facility] at this time.     Patient is verbalizing and is demonstrating understanding of mindful-based movements (\"move in the tube\") principles of keeping UEs proximal to ribcage to prevent lateral pull on the sternum during load-bearing activities with visual and verbal cues required for compliance. Current Level of Function Impacting Discharge (ADLs): mod independent to CGA for mobility/transfers, mod independent to SBA for dressing    Other factors to consider for discharge: supportive family, prior level of function, bilevel home         PLAN :  Patient continues to benefit from skilled intervention to address the above impairments. Continue treatment per established plan of care to address goals. Recommend with staff: functional mobility to and from bathroom for toileting    Recommend next OT session: continue POC    Recommendation for discharge: (in order for the patient to meet his/her long term goals)  Occupational therapy at least 2 days/week in the home     This discharge recommendation:  Has been made in collaboration with the attending provider and/or case management    IF patient discharges home will need the following DME: patient owns DME required for discharge       SUBJECTIVE:   Patient stated I'm going to be staying downstairs when I go home.     OBJECTIVE DATA SUMMARY:   Cognitive/Behavioral Status:  Neurologic State: Alert                   Functional Mobility and Transfers for ADLs:  Bed Mobility:  Scooting: Modified independent    Transfers:  Sit to Stand: Supervision;Contact guard assistance (CGA from low surface in waiting room)          Balance:  Sitting: Intact  Standing: Intact; Without support    ADL Intervention:                      Upper Body Dressing Assistance  Pullover Shirt: Stand-by assistance (cueing to maintain precautions)  Front Opened Shirt: Supervision (don zorater vest)  Cues: Verbal cues provided    Lower Body Dressing Assistance  Pants With Button/Zipper: Stand-by assistance (assist for line management)  Socks: Modified independent  Shoes with [employer]: Modified independent  Leg Crossed Method Used: Yes  Position Performed: Seated in chair;Standing  Cues: Verbal cues provided      [employer] (basic and instrumental ADLs addressed):   Patient recalled and demonstrated process to leave home for an outing: dressing self with the desired and alternative clothing options to increase emotional and social acceptability; SC check with no cues, switchover PM - batteries with family assist to gather materials, independent otherwise (see below); items packed in emergency pack 5/5 with no cues; sternal precautions during scenarios 3/3 with verbal cues; VAD emergency scenarios 3/3 in the visitor waiting area (battery alarm, location of emergency pack, SC alarm) with verbal cues. Patient demonstrated switchover from power module to battery in prep for ADL routine with independence. Demonstrated the following tasks:    Independent Verbal cues Physical assistance Comments   Check PM & SC x      Ensure  clipped onto stabilization belt x      Remove front (green lighted) batteries from , place back batteries into front slots x   Patient independently cued family to complete   Check batteries x      Position batteries into battery clips x      Don holster vest x      Disconnect power leads x      Insert power lead into battery clip x      [place] batteries in holster x      Secure batteries with Velcro and clips x             Patient instructed and educated on mindful movement principles based on Move in [place] concept to include maintaining bilateral elbows close to rib cage when performing any load-bearing activity such as getting in/out of bed, pushing up from a chair, opening a door, or lifting a box. Patient was given a handout with diagrams of each correct/incorrect method of performing each of the above tasks.    Patient instructed on the ability to utilize upper extremities outside the tube when doing any non-load bearing activity such as washing hair/body, brushing teeth, retrieving clothing items, or scratching your back. Patient encouraged to also perform upper extremity exercises \"outside of the tube\" to prevent scar tissue formation around sternal incision site. Patient instructed in detail about activities to heed with caution, allowing pain to be the guide. These activities include but are not limited to: mowing the lawn, riding a bike, walking a dog, lifting a child, workshop hobbies, golfing, sexual activity, vacuuming, fishing, scrubbing the floors, and moving furniture. Patient was given the [address] in the [place] handout to describe each of these activities in detail. Patient instructed no asymmetrical reaching over head to ensure B UEs when shoulders >90* i.e. reaching in cabinets and dressing. Instruction on upper body dressing techniques of over head, then arms through to decrease pain and unilateral shoulder flexion >90*. Instruction on the benefits of utilizing B UEs during functional tasks i.e. opening the fridge, stepping into the tub. May have to adjust home setup to increase ease with items closer to waist height to prevent deep bending and the automatic  of asymmetrical UE WB/pushing for stabilization during bending. Benefit to don clothing tailor sitting and don all clothing while sitting prior to standing. Patient demonstrated lower body dressing with Stand-by assistance. Instruction and indicated understanding on the benefits of loose clothing throughout to accommodate for post surgical swelling, decreased ROM and increased pain. Instruction and indicated understanding the technique of pull over shirt versus front open clothing. Increase activity tolerance for home, work, and sexual intercourse by pacing self with increasing the arm exercises, sitting duration, frequency OOB, walking, standing, and ADLs.  Instructed and indicated understanding of s/s of too much activity, how to respond to s/s safely. Pain:  Patient complaints of chest pain at chest tube sites, nursing providing IV medication prior to mobility    Activity Tolerance:   Good, SpO2 stable on RA and decreased SOB compared to prior sessions    After treatment patient left in no apparent distress:   Sitting in chair, Call bell within reach and Caregiver / family present    COMMUNICATION/COLLABORATION:   The patients plan of care was discussed with: Physical therapist and Registered nurse.      Andressa Adame OTR/L  Time Calculation: 76 mins

## 2022-04-29 NOTE — PROGRESS NOTES
[facility] in [place], [place]  Inpatient Progress Note    Patient name: Radha Bartlett  Patient : 1945  Patient MRN: [id number]  Consulting MD: Julio C Ward MD  Date of service: 22    REASON FOR REFERRAL:  Management of LVAD    PLAN OF CARE:  · 69 y.o. male with severe ischemic cardiomyopathy, LVEF 15-20% s/p HM3 LVAD implant as DT on 22 by Dr. Cherelle Campo c/b intraoperative bleeding requiring multiple blood products and subsequent RV failure with severe TR on high dose dobutamine IV  · Plan for remainder of hospitalization includes:  · Surveillance weekly echos to determine if RV function improves and TV coapts  · Will likely need Aspira cath prior to D/C  · PT/OT/Nutrition  · Optimization of GDMT     PLAN:  POD 23  TTE  shows severe TR, severe RV dysfunction; continue to optimize medically   Continue speed 4800rpms, low speed 4400  Continue dobutamine 6 mcg/kg/min - will need to D/C home as bridge to RV recovery   Continue hydralazine 50 mg PO q8h   Continue amiodarone 200 mg PO daily x 2 weeks   Continue Entresto 24/26 mg PO BID   Cont Eplerenone 25mg daily  Continue Bumex 2 mg PO BID    Completed course of cefepime for sternal incision; dressing to be assessed by CSS PA today   Driveline dressing changes daily for now until 5/3/22  Needs EKG weekly   Continue warfarin, goal INR 2-3; 4 mg tonight   ASA 81mg daily  Continue colchicine for pericarditis   Decrease gabapentin to 100 mg PO BID; patient reports tremors following admin   IV fentanyl prn (pt with hx anaphylaxis to oxycodone)  PRN toradol  Continue bowel regimen   Per Dr. Cherelle Campo, plan to keep CTs in until Monday and then assess need for Aspira cath next week   CPAP qHS  Advanced care plan forms on file   Strict I/O, daily weights, Na+ restricted diet   Continue VAD education   Equipment ordered  Outing today  Tentative D/C date next week     LAST 24 HOURS:  Spontaneous resolution of PTX Denies complaints   Working with PT/OT   INR 2.6     REVIEW OF SYSTEMS:  Review of Systems   Constitutional: Negative for chills, fever, malaise/fatigue and weight loss. Respiratory: Negative for cough and shortness of breath. Cardiovascular: Negative for chest pain, palpitations, orthopnea and leg swelling. Gastrointestinal: Negative for abdominal pain, nausea and vomiting. Neurological: Negative for dizziness and weakness. Psychiatric/Behavioral: Negative. LIFE GOALS:  Patient's personal goals include: getting stronger and going home soon  Important upcoming milestones or family events: The patient identifies the following as important for living well: being independent, being at home, enjoying family time    OBJECTIVE:  PHYSICAL EXAM:  Visit Vitals  /88   Pulse 84   Temp 98.6 °F (37 °C)   Resp 16   Ht 6' 1\" (1.854 m)   Wt 176 lb 5.9 oz (80 kg)   SpO2 94%   BMI 23.27 kg/m²       LVAD INTERROGATION:  Device interrogated in person  Device function normal, normal flow, no events  LVAD   Pump Speed (RPM): 4800  Pump Flow (LPM): 3.7  MAP: 70  PI (Pulsitility Index): 5.1  Power: 3.1   Test: No  Back Up  at Bedside & Labeled: Yes  Power Module Test: No  Driveline Site Care: No  Driveline Dressing: Clean, Dry, and Intact  MAP in Therapeutic Range (Outpatient): Yes  Testing  Alarms Reviewed: Yes  Back up SC speed: 4800  Back up Low Speed Limit: 4400  Emergency Equipment with Patient?: Yes  Emergency procedures reviewed?: Yes  Drive line site inspected?: No  Drive line intergrity inspected?: Yes  Drive line dressing changed?: No      Physical Exam  Vitals reviewed. Constitutional:       General: He is not in acute distress. Appearance: Normal appearance. Cardiovascular:      Rate and Rhythm: Normal rate and regular rhythm. Comments: LVAD Hum noted on auscultation  Pulmonary:      Effort: Pulmonary effort is normal. No respiratory distress. Abdominal:      General: Bowel sounds are normal. There is no distension. Palpations: Abdomen is soft. Tenderness: There is no abdominal tenderness. Musculoskeletal:      Right lower leg: No edema. Left lower leg: No edema. Skin:     General: Skin is warm and dry. Capillary Refill: Capillary refill takes less than 2 seconds. Neurological:      General: No focal deficit present. Mental Status: He is alert and oriented to person, place, and time. Psychiatric:         Mood and Affect: Mood normal.         Behavior: Behavior normal.         Thought Content: Thought content normal.         Judgment: Judgment normal.            CARDIAC IMAGING AND DIAGNOSTICS REVIEWED:  Echo 4/26/22 - LVIDd 4.9 cm, TAPSE 0.5 cm,   Echo 4/18/22- LVEF 15-20%, severe TR, TAPSE 0.5cm, RVIDd 6cm   Echo (4/8/22)    Left Ventricle: Left ventricle size is normal. Moderately increased wall thickness. Findings consistent with concentric remodeling. Severe global hypokinesis present. Severely reduced left ventricular systolic function with a visually estimated EF of 10 -15%. 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   Aortic Valve: Moderate sclerosis of the aortic valve cusp.   Mitral Valve: Moderately thickened leaflet. Mild transvalvular regurgitation.   Tricuspid Valve: Severe transvalvular regurgitation.   Right Atrium: Right atrium is severely dilated. Echo (3/2/22)   · LV 10-15%  · Mod-severe MR      Echo (11/23/21):  · LV 15-20%. · Mod-severe, MR, mod-TR     Echo (5/20/21)  · LV: Estimated LVEF is 20 - 25%. Normal wall thickness. Mildly dilated left ventricle. Severely and globally reduced systolic function. Severe (grade 4) left ventricular diastolic dysfunction. · LA: Severely dilated left atrium. · RA: Severely dilated right atrium. · MV: Moderate mitral valve regurgitation is present.   · TV: Moderate tricuspid valve regurgitation is present. · AO: Mild aortic root dilatation. · RV: Pacer/ICD present. · LVED 6.2cm     EKG (5/20/21) SB with 1degree AV block, QRS 116ms     LHC (5/24/21) Native Coronaries: LM - moderate to severe distal disease 50%. % prox occluded (), heavily calcified, LCx: 80% proximal stenosis. OM1 is  (seen filling faintly via collaterals). OM2 99% stenosis. RCA: 100% proximal occluded. LIMA to LAD: patent, supplies only a diagonal branch (probably D2). The LAD distal to the bifurcation is 100% occluded () and fills via right to left collaterals off the SVG to RCA. SVG to R-PDA: patent with moderate diffuse irregularities but no obstructive disease in the graft. No appealing interventional targets. NST as above     ICD Interrogation (11/12/2021) [employer] VVI, thoracic impedence trending up, no events, normal device function and good battery  ICD interrogation (5/12/21) [employer] single lead, no events, normal device function and good battery     HEMODYNAMICS:  RHC 5/24/21 CI 1.97, PA 33/9/17, RA 1, PCWP 6- off milrinone   CPEST not done     Patient underwent a 6 minute walk test 11/12/2021     6 Min Walk Report 11/12/2021 7/6/2021 5/20/2021   (PRE) HR 88 98 74   (PRE) O2 Sat 100 - 99   (POST)  - 81   (POST) O2 Sat 99 98% on Ra 99   Distance in Meters 386.58 - 1158.24       OTHER IMAGING:  CXR (6/17/21) clear  CT 5/21/21 1. Irregular pulmonary nodule in the left upper lobe measuring 2 cm, suspicious for primary pulmonary malignancy. 2. Additional 6 mm left upper lobe pulmonary nodule. 3. Severe calcific atherosclerosis of the coronary arteries and abdominal aorta. No aneurysm. 4. Cardiomegaly. 5. No acute abnormality within the chest, abdomen, or pelvis.       LABORATORY RESULTS:     Labs Latest Ref Rng & Units 4/29/2022 4/28/2022 4/27/2022 4/26/2022 4/25/2022 4/25/2022 4/25/2022   WBC 4.1 - 11.1 K/uL 5.9 4.8 5.0 4.7 6.5 5.4 5.0   RBC 4.10 - 5.70 M/uL 3.23(L) 2.91(L) 2.99(L) 2.96(L) 3.29(L) 2.96(L) 2.61(L)   Hemoglobin 12.1 - 17.0 g/dL 8.4(L) 7. 5(L) 7. 7(L) 7. 8(L) 8.6(L) 7. 6(L) 6. 8(L)   Hematocrit 36.6 - 50.3 % 26. 6(L) 24. 2(L) 25. 0(L) 24. 8(L) 27. 6(L) 24. 5(L) 22. 0(L)   MCV 80.0 - 99.0 FL 82.4 83.2 83.6 83.8 83.9 82.8 84.3   Platelets 329 - 016 K/uL 299 261 257 259 269 249 222   Lymphocytes 12 - 49 % - - - - - - -   Monocytes 5 - 13 % - - - - - - -   Eosinophils 0 - 7 % - - - - - - -   Basophils 0 - 1 % - - - - - - -   Bands 0 - 6 % - - - - - - -   Albumin 3.5 - 5.0 g/dL 3. 0(L) 2. 8(L) 2. 9(L) 2. 7(L) 3.0(L) - 2. 7(L)   Calcium 8.5 - 10.1 MG/DL 7. 9(L) 8.2(L) 8.6 8.2(L) 8.0(L) - 7. 6(L)   Glucose 65 - 100 mg/dL 117(H) 93 92 109(H) 157(H) - 103(H)   BUN 6 - 20 MG/DL 23(H) 26(H) 26(H) 23(H) 21(H) - 19   Creatinine 0.70 - 1.30 MG/DL 1.00 1.08 0.96 0.94 0.90 - 0.84   Sodium 136 - 145 mmol/L 136 134(L) 135(L) 133(L) 132(L) - 133(L)   Potassium 3.5 - 5.1 mmol/L 3.9 4.0 3.9 4.0 4.1 - 3.8   TSH 0.36 - 3.74 uIU/mL - - - - - - -   LDH 85 - 241 U/L 176 188 169 190 - - 169   Some recent data might be hidden     Lab Results   Component Value Date/Time    TSH 2.26 04/10/2022 03:00 AM    TSH 1.68 03/01/2022 11:50 AM    TSH 1.47 05/26/2021 10:44 PM    TSH 1.97 05/20/2021 04:28 PM    TSH 1.41 02/09/2021 03:05 PM    TSH 1.740 08/14/2018 09:58 AM    TSH 1.710 10/18/2017 12:00 AM         HISTORY:  PAST MEDICAL HISTORY:  Past Medical History:   Diagnosis Date    CAD (coronary artery disease) '90 '97, '13 x 2    [place] [facility] [place]    Calculus of kidney     Colonic polyps     Congestive heart failure, unspecified     Diabetes ([facility] [address]     Gastritis     Hypercholesterolemia     Sleep apnea        PAST SURGICAL HISTORY:  Past Surgical History:   Procedure Laterality Date    COLONOSCOPY  04/06/2011    16, due 21    COLONOSCOPY N/A 3/2/2022    COLONOSCOPY    :- performed by Natalie Em MD at [facility]. [address], COLON, DIAGNOSTIC      11, due 16    HX CORONARY ARTERY BYPASS GRAFT  8/22/12    x 4 vessel by MISSY CABRALES HEENT      LASIK    HX PACEMAKER PLACEMENT  1/30/13    OR CARDIAC SURG PROCEDURE UNLIST  2012    x 4 vessels       FAMILY HISTORY:  Family History   Problem Relation Age of Onset    Heart Disease Mother         MI    Heart Disease Father         CAD & PVD    Lung Disease Father     Cancer Father         lung    Diabetes Maternal Grandmother     Heart Disease Other         CAD    Stroke Sister        SOCIAL HISTORY:  Social History     Socioeconomic History    Marital status:    Tobacco Use    Smoking status: Never Smoker    Smokeless tobacco: Never Used   Vaping Use    Vaping Use: Never used   Substance and Sexual Activity    Alcohol use:  Yes     Alcohol/week: 0.0 standard drinks     Comment:  VERY RARE    Drug use: No       ALLERGY:  Allergies   Allergen Reactions    Oxycodone Anaphylaxis    Pcn [Penicillins] Hives        CURRENT MEDICATIONS:    Current Facility-Administered Medications:     gabapentin (NEURONTIN) capsule 100 mg, 100 mg, Oral, BID, Alisa Wong NP    warfarin (COUMADIN) tablet 4 mg, 4 mg, Oral, ONCE, Alisa Wong NP    magnesium oxide (MAG-OX) tablet 400 mg, 400 mg, Oral, DAILY, Lydia Wong NP, 400 mg at 04/29/22 0957    bumetanide (BUMEX) tablet 2 mg, 2 mg, Oral, BID, Lydia Wong NP, 2 mg at 04/29/22 0958    sacubitriL-valsartan (ENTRESTO) 24-26 mg tablet 1 Tablet, 1 Tablet, Oral, Q12H, Alisa Wong NP, 1 Tablet at 04/29/22 0959    amiodarone (CORDARONE) tablet 200 mg, 200 mg, Oral, DAILY, Lydia Wong NP, 200 mg at 04/29/22 0959    DOBUTamine (DOBUTREX) 1,000 mg/250 mL (4,000 mcg/mL) infusion, 6 mcg/kg/min, IntraVENous, CONTINUOUS, Lydia Wong NP, Last Rate: 7.5 mL/hr at 04/29/22 1046, 6 mcg/kg/min at 04/29/22 1046    ketorolac (TORADOL) injection 15 mg, 15 mg, IntraVENous, Q6H PRN, Millie Wong NP, 15 mg at 04/27/22 1537    sodium chloride (OCEAN) 0.65 % nasal squeeze bottle 2 Spray, 2 Spray, Both Nostrils, Q2H PRN, Terry, Danica B, NP    docusate sodium (COLACE) capsule 100 mg, 100 mg, Oral, DAILY, Terry, Danica B, NP, 100 mg at 04/29/22 0956    eplerenone (INSPRA) tablet 25 mg, 25 mg, Oral, DAILY, Terry, Danica B, NP, 25 mg at 04/29/22 1001    guaiFENesin (ROBITUSSIN) 100 mg/5 mL oral liquid 100 mg, 100 mg, Oral, Q4H PRN, Kathi Jean MD, 100 mg at 04/29/22 0114    polyethylene glycol (MIRALAX) packet 17 g, 17 g, Oral, DAILY, Shade Messing B, NP, 17 g at 04/29/22 1000    senna-docusate (PERICOLACE) 8.6-50 mg per tablet 1 Tablet, 1 Tablet, Oral, DAILY PRN, Shade Messing B, NP, 1 Tablet at 04/24/22 0913    oxymetazoline (AFRIN) 0.05 % nasal spray 2 Spray, 2 Spray, Both Nostrils, BID PRN, Shade Messing B, NP, 2 Spray at 04/24/22 1612    hydrALAZINE (APRESOLINE) tablet 50 mg, 50 mg, Oral, Q8H, Millie Wong, NP, 50 mg at 04/29/22 1225    colchicine tablet 0.6 mg, 0.6 mg, Oral, DAILY, Timothy Wong T, NP, 0.6 mg at 04/29/22 0957    alteplase (CATHFLO) 1 mg in sterile water (preservative free) 1 mL injection, 1 mg, InterCATHeter, PRN, Kaci Xie MD, 1 mg at 04/25/22 2136    bisacodyL (DULCOLAX) suppository 10 mg, 10 mg, Rectal, DAILY PRN, Daysi Amato MD, 10 mg at 04/12/22 1533    albumin human 25% (BUMINATE) solution 12.5 g, 12.5 g, IntraVENous, BID, PollTimothy soliman, NP, 12.5 g at 04/29/22 0836    famotidine (PEPCID) tablet 20 mg, 20 mg, Oral, Q12H, Daysi Amato MD, 20 mg at 04/29/22 0956    mirtazapine (REMERON) tablet 7.5 mg, 7.5 mg, Oral, QHS, Terry, Danica B, NP, 7.5 mg at 04/28/22 2109    lidocaine 4 % patch 1 Patch, 1 Patch, TransDERmal, Q24H, Danica Stewart B, NP, 1 Patch at 04/29/22 1225    Warfarin NP/MD dosing, , Other, PRN, Terry, Danica B, NP    melatonin tablet 3 mg, 3 mg, Oral, QHS PRN, Zoraida Gresham MD, 3 mg at 04/20/22 2127    hydrALAZINE (APRESOLINE) 20 mg/mL injection 10 mg, 10 mg, IntraVENous, Q6H PRN, Caden Gencornel, NP, 10 mg at 04/24/22 1415    aspirin chewable tablet 81 mg, 81 mg, Oral, DAILY, Voncille Budds B, NP, 81 mg at 04/29/22 0957    hydrALAZINE (APRESOLINE) 20 mg/mL injection 5 mg, 5 mg, IntraVENous, Q6H PRN, Voncille Budds B, NP, 5 mg at 04/13/22 2218    sodium chloride (NS) flush 5-40 mL, 5-40 mL, IntraVENous, Q8H, Myron Wonga T, NP, 10 mL at 04/29/22 0836    sodium chloride (NS) flush 5-40 mL, 5-40 mL, IntraVENous, PRN, Marvin, Leonid MONTERO, NP, 10 mL at 04/24/22 0914    0.9% sodium chloride infusion, 9 mL/hr, IntraVENous, CONTINUOUS, Leonid Wong NP, Last Rate: 3 mL/hr at 04/20/22 0513, 3 mL/hr at 04/20/22 0513    acetaminophen (TYLENOL) tablet 650 mg, 650 mg, Oral, Q6H PRN, Beni Wong NP, 650 mg at 04/28/22 2109    naloxone (NARCAN) injection 0.4 mg, 0.4 mg, IntraVENous, PRN, Beni Wong NP    [facility]) injection 4 mg, 4 mg, IntraVENous, Q4H PRN, Leonid Wong NP, 4 mg at 04/11/22 2139    albuterol-ipratropium (DUO-NEB) 2.5 MG-0.5 MG/3 ML, 3 mL, Nebulization, Q6H PRN, Beni Wong NP    glucose chewable tablet 16 g, 4 Tablet, Oral, PRN, Beni Wong NP    glucagon (GLUCAGEN) injection 1 mg, 1 mg, IntraMUSCular, PRN, Beni Wong NP    insulin lispro (HUMALOG) injection, , SubCUTAneous, AC&HS, Beni Wong NP, 2 Units at 04/24/22 1310    sucralfate (CARAFATE) tablet 1 g, 1 g, Oral, AC&HS, Beni Wong, NP, 1 g at 04/29/22 1225    0.45% sodium chloride infusion, 10 mL/hr, IntraVENous, CONTINUOUS, Caleb Najera MD, Stopped at 04/10/22 0730    fentaNYL citrate (PF) injection 25 mcg, 25 mcg, IntraVENous, Q2H PRN, Beni Wong, NP, 25 mcg at 04/25/22 1251    PATIENT CARE TEAM:  Patient Care Team:  David Pierce MD as PCP - General (Internal Medicine)  David Pierce MD as PCP - [facility]  Naz Mock MD as Physician (Cardiology)  Nida Garcia MD as Physician (Gastroenterology)  Nabor Mujica MD as Physician (Orthopedic Surgery)  Amor Moreno MD as Physician (Ophthalmology)  Marcela Van MD (Cardiology)  Gerardo Cazares MD (Cardiology)       Thank you for allowing us to participate in this patient's care.       Anil Huerta NP   [address] [place] [address], [address]  Phone: [phone]

## 2022-04-29 NOTE — PROGRESS NOTES
0730: Bedside shift change report given to Arya Doherty (oncoming nurse) by Guerline Ruth (offgoing nurse). Report included the following information SBAR, Kardex, Intake/Output, MAR and Recent Results. 1630: LVAD dressing change completed by daughter    1: Bedside shift change report given to Guerline Ruth (oncoming nurse) by Arya Doherty (offgoing nurse). Report included the following information SBAR, Kardex, Intake/Output, MAR and Recent Results.

## 2022-04-29 NOTE — PROGRESS NOTES
Problem: Mobility Impaired (Adult and Pediatric)  Goal: *Acute Goals and Plan of Care (Insert Text)  Description: FUNCTIONAL STATUS PRIOR TO ADMISSION: Patient was independent and active without use of DME.    HOME SUPPORT PRIOR TO ADMISSION: The patient lived with his wife but did not require assist.    Physical Therapy Goals- Continue goals with exception of #7 for next 7 days 4/22/2022, continue all goals 4/29/2022    Weekly Reassessment 4/15/2022 (goals progressed)  1. Patient will move from supine to sit and sit to supine , scoot up and down, and roll side to side in bed with head of bed flat with supervision/set-up within 7 days. 2.  Patient will perform sit to/from stand from chair height with supervision/set-up within 7 days. 3.  Patient will ambulate 300 feet stand by assist within 7 days. 4.  Patient will ascend/descend 6 stairs with handrail(s) with minimal assistance/contact guard assist within 14 days. 5.  Patient will perform cardiac exercises per protocol with supervision/set-up within 7 days. 6.  Patient will verbally and functionally recall mindful movement precautions within 7 days. 7.  Patient will perform power exchange for power module to/from battery with stand by assist within 7 days. -MET currently independent 4/22/2022    Initiated 4/8/2022  1. Patient will move from supine to sit and sit to supine , scoot up and down, and roll side to side in bed with supervision/set-up within 7 days. 2.  Patient will perform sit to/from stand with supervision/set-up within 7 days. 3.  Patient will ambulate 50 feet with least restrictive assistive device and minimal assistance/contact guard assist within 7 days. 4.  Patient will ascend/descend 6 stairs with handrail(s) with minimal assistance/contact guard assist within 14 days. 5.  Patient will perform cardiac exercises per protocol with supervision/set-up within 7 days.   6.  Patient will verbally and functionally recall 3/3 sternal precautions within 7 days. 7.  Patient will perform power exchange for power module to/from battery with minimal assistance within 7 days. Outcome: Progressing Towards Goal   PHYSICAL THERAPY TREATMENT: WEEKLY REASSESSMENT  Patient: Holli Navarro (75 y.o. male)  Date: 4/29/2022  Primary Diagnosis: HFrEF (heart failure with reduced ejection fraction) ([facility]) [I50.20]  Procedure(s) (LRB):  REDO STERNOTOMY; RIGHT GROIN CUTDOWN FOR CANNULATION;  INSERTION OF LEFT VENTRICULAR ASSIST DEVICE (HMIII); AORTIC VALVE CLOSURE CHEL BY DR. Paz Spurling. (N/A) 24 Days Post-Op   Precautions:   Fall (mindful movement, LVAD)      ASSESSMENT  Patient continues with skilled PT services and is progressing towards goals. Patient received seated in chair, agreeable to therapy and wife + daughter present for outing. Reviewed importance of structuring day to limit stairs for energy conservation and to prevent falls due to fatigue. Will address stairs in future sessions. Patient ambulated to 4th floor waiting room with SBA and demonstrated appropriate pacing strategies (ie. self-initiating standing rest breaks, self-selecting maintainable gait velocity). Patient reviewed emergency scenarios and performed with wife and daughter's assistance/input. Patient understands that he is not to pullout his own driveline from the  under any circumstance. Patient and wife + daughter are prepared for what to do in case of an emergency (low batteries, clip malfunction,  failure). Emergency Scenario      Comments    Low Batteries Patient able to state kizzy indicates 15 min left and is able to be silenced. Able to state red battery alarm indicates 5 min left and unable to be silenced. Patient understanding that batteries drain simultaneously and that he should always switch batteries one at a time.          Clip Malfunction Patient able to problem-solve with minimal verbal cuing from therapist     Daughter stated that hash maia light indicates adjacent power lead/clip    Wife and patient verbalized cleaning equipment with alcohol swabs.  Failure   Wife remembering to call emergency LVAD number and 911 if green arrows are not illuminated. Daughter requiring very minimal verbal cuing to set-up \"back up system\" and for remembering visual feedback for a secured/in-place drive-line          Patient's progression toward goals since last assessment: progress towards all goals, still weak getting out of low chair however will have lift chair at home    Current Level of Function Impacting Discharge (mobility/balance): min A out of chair, CGA-SBA otherwise    Other factors to consider for discharge: d/c next week, still with CTs and on dobut         PLAN :  Goals have been updated based on progression since last assessment. Patient continues to benefit from skilled intervention to address the above impairments. Recommendations and Planned Interventions: bed mobility training, transfer training, gait training, therapeutic exercises, neuromuscular re-education, edema management/control, patient and family training/education, and therapeutic activities      Frequency/Duration: Patient will be followed by physical therapy:  5 times a week to address goals. Recommendation for discharge: (in order for the patient to meet his/her long term goals)  Physical therapy at least 2 days/week in the home AND ensure assist and/or supervision for safety with household mobility    This discharge recommendation:  Has been made in collaboration with the attending provider and/or case management    IF patient discharges home will need the following DME: none         SUBJECTIVE:   Patient stated I feel better today after getting that fluid off.     OBJECTIVE DATA SUMMARY:   HISTORY:    Past Medical History:   Diagnosis Date    CAD (coronary artery disease) '90 '97, '13 x 2    MI, code ice in 2013 at [facility]    Calculus of kidney     Colonic polyps     Congestive heart failure, unspecified     Diabetes ([facility] [address]     Gastritis     Hypercholesterolemia     Sleep apnea      Past Surgical History:   Procedure Laterality Date    COLONOSCOPY  04/06/2011    17, due 22    COLONOSCOPY N/A 3/2/2022    COLONOSCOPY    :- performed by Gilberto Rodriguez MD at [facility] ENDOSCOPY    ENDOSCOPY, COLON, DIAGNOSTIC      11, due 17    HX CORONARY ARTERY BYPASS GRAFT  8/22/12    x 4 vessel by MISSY Ramirez    HX HEENT      LASIK    HX PACEMAKER PLACEMENT  1/30/13    [place] CARDIAC SURG PROCEDURE UNLIST  2012    x 4 vessels       Personal factors and/or comorbidities impacting plan of care: PMH    Home Situation  Home Environment: Private residence  # Steps to Enter: 1 (high step)  Rails to Enter: Yes (pole)  One/Two Story Residence: Two story  # of Interior Steps: 12 (6 up & 6 down)  Living Alone: No  Support Systems: Spouse/Significant Other  Patient Expects to be Discharged to[de-identified] Home with home health  Current DME Used/Available at Home: Cane, straight  Tub or Shower Type: Shower    EXAMINATION/PRESENTATION/DECISION MAKING:   Critical Behavior:  Neurologic State: Alert  Orientation Level: Oriented X4  Cognition: Appropriate decision making,Appropriate for age attention/concentration,Appropriate safety awareness  Hearing: Auditory  Auditory Impairment: None  Range Of Motion:  AROM: Within functional limits  Strength:    Strength: Generally decreased, functional  Tone & Sensation:   Tone: Normal  Sensation: Intact  Coordination:  Coordination: Within functional limits  Functional Mobility:  Bed Mobility:  Scooting: Modified independent  Transfers:  Sit to Stand: Minimum assistance (from low chair)  Stand to Sit: Supervision  Balance:   Sitting: Intact  Standing: Intact; Without support  Ambulation/Gait Training:  Distance (ft): 200 Feet (ft) (x2)  Ambulation - Level of Assistance: Stand-by assistance  Gait Abnormalities: Decreased step clearance  Base of Support: Widened  Speed/Charlene: Pace decreased (<100 feet/min)  Step Length: Right shortened;Left shortened    Pain Rating:  Pain meds just prior to session    Activity Tolerance:   Good and less SOB today    After treatment patient left in no apparent distress:   Sitting in chair, Call bell within reach, and Caregiver / family present    COMMUNICATION/EDUCATION:   The patients plan of care was discussed with: Registered nurse. Fall prevention education was provided and the patient/caregiver indicated understanding., Patient/family have participated as able in goal setting and plan of care. , and Patient/family agree to work toward stated goals and plan of care.     Thank you for this referral.  Deborah Adame, PT, DPT   Time Calculation: 47 mins

## 2022-04-30 ENCOUNTER — APPOINTMENT (OUTPATIENT)
Dept: GENERAL RADIOLOGY | Age: 77
DRG: 001 | End: 2022-04-30
Attending: NURSE PRACTITIONER
Payer: MEDICARE

## 2022-04-30 LAB
ABO + RH BLD: NORMAL
ALBUMIN SERPL-MCNC: 2.8 G/DL (ref 3.5–5)
ALBUMIN/GLOB SERPL: 1.1 {RATIO} (ref 1.1–2.2)
ALP SERPL-CCNC: 138 U/L (ref 45–117)
ALT SERPL-CCNC: 20 U/L (ref 12–78)
ANION GAP SERPL CALC-SCNC: 8 MMOL/L (ref 5–15)
AST SERPL-CCNC: 54 U/L (ref 15–37)
ATRIAL RATE: 54 BPM
BILIRUB SERPL-MCNC: 0.9 MG/DL (ref 0.2–1)
BLOOD GROUP ANTIBODIES SERPL: NORMAL
BNP SERPL-MCNC: 2099 PG/ML
BUN SERPL-MCNC: 24 MG/DL (ref 6–20)
BUN/CREAT SERPL: 24 (ref 12–20)
CALCIUM SERPL-MCNC: 7.7 MG/DL (ref 8.5–10.1)
CALCULATED R AXIS, ECG10: 158 DEGREES
CALCULATED T AXIS, ECG11: 159 DEGREES
CHLORIDE SERPL-SCNC: 99 MMOL/L (ref 97–108)
CO2 SERPL-SCNC: 30 MMOL/L (ref 21–32)
CREAT SERPL-MCNC: 1.01 MG/DL (ref 0.7–1.3)
DIAGNOSIS, 93000: NORMAL
ERYTHROCYTE [DISTWIDTH] IN BLOOD BY AUTOMATED COUNT: 19 % (ref 11.5–14.5)
GLOBULIN SER CALC-MCNC: 2.5 G/DL (ref 2–4)
GLUCOSE BLD STRIP.AUTO-MCNC: 104 MG/DL (ref 65–117)
GLUCOSE BLD STRIP.AUTO-MCNC: 114 MG/DL (ref 65–117)
GLUCOSE BLD STRIP.AUTO-MCNC: 151 MG/DL (ref 65–117)
GLUCOSE BLD STRIP.AUTO-MCNC: 170 MG/DL (ref 65–117)
GLUCOSE SERPL-MCNC: 125 MG/DL (ref 65–100)
HCT VFR BLD AUTO: 24.6 % (ref 36.6–50.3)
HGB BLD-MCNC: 7.6 G/DL (ref 12.1–17)
INR PPP: 2.5 (ref 0.9–1.1)
LDH SERPL L TO P-CCNC: 158 U/L (ref 85–241)
MAGNESIUM SERPL-MCNC: 2.3 MG/DL (ref 1.6–2.4)
MCH RBC QN AUTO: 25.8 PG (ref 26–34)
MCHC RBC AUTO-ENTMCNC: 30.9 G/DL (ref 30–36.5)
MCV RBC AUTO: 83.4 FL (ref 80–99)
NRBC # BLD: 0 K/UL (ref 0–0.01)
NRBC BLD-RTO: 0 PER 100 WBC
PLATELET # BLD AUTO: 275 K/UL (ref 150–400)
PMV BLD AUTO: 10 FL (ref 8.9–12.9)
POTASSIUM SERPL-SCNC: 4 MMOL/L (ref 3.5–5.1)
PROT SERPL-MCNC: 5.3 G/DL (ref 6.4–8.2)
PROTHROMBIN TIME: 24.5 SEC (ref 9–11.1)
Q-T INTERVAL, ECG07: 600 MS
QRS DURATION, ECG06: 180 MS
QTC CALCULATION (BEZET), ECG08: 661 MS
RBC # BLD AUTO: 2.95 M/UL (ref 4.1–5.7)
SERVICE CMNT-IMP: ABNORMAL
SERVICE CMNT-IMP: ABNORMAL
SERVICE CMNT-IMP: NORMAL
SERVICE CMNT-IMP: NORMAL
SODIUM SERPL-SCNC: 137 MMOL/L (ref 136–145)
SPECIMEN EXP DATE BLD: NORMAL
VENTRICULAR RATE, ECG03: 73 BPM
WBC # BLD AUTO: 5.6 K/UL (ref 4.1–11.1)

## 2022-04-30 PROCEDURE — 85610 PROTHROMBIN TIME: CPT

## 2022-04-30 PROCEDURE — 83735 ASSAY OF MAGNESIUM: CPT

## 2022-04-30 PROCEDURE — P9047 ALBUMIN (HUMAN), 25%, 50ML: HCPCS | Performed by: NURSE PRACTITIONER

## 2022-04-30 PROCEDURE — 74011250637 HC RX REV CODE- 250/637: Performed by: NURSE PRACTITIONER

## 2022-04-30 PROCEDURE — 80053 COMPREHEN METABOLIC PANEL: CPT

## 2022-04-30 PROCEDURE — 82962 GLUCOSE BLOOD TEST: CPT

## 2022-04-30 PROCEDURE — 65660000001 HC RM ICU INTERMED STEPDOWN

## 2022-04-30 PROCEDURE — 71045 X-RAY EXAM CHEST 1 VIEW: CPT

## 2022-04-30 PROCEDURE — 74011250636 HC RX REV CODE- 250/636: Performed by: NURSE PRACTITIONER

## 2022-04-30 PROCEDURE — 86900 BLOOD TYPING SEROLOGIC ABO: CPT

## 2022-04-30 PROCEDURE — 36415 COLL VENOUS BLD VENIPUNCTURE: CPT

## 2022-04-30 PROCEDURE — 74011000250 HC RX REV CODE- 250: Performed by: NURSE PRACTITIONER

## 2022-04-30 PROCEDURE — 74011250637 HC RX REV CODE- 250/637: Performed by: STUDENT IN AN ORGANIZED HEALTH CARE EDUCATION/TRAINING PROGRAM

## 2022-04-30 PROCEDURE — 99233 SBSQ HOSP IP/OBS HIGH 50: CPT | Performed by: NURSE PRACTITIONER

## 2022-04-30 PROCEDURE — 83880 ASSAY OF NATRIURETIC PEPTIDE: CPT

## 2022-04-30 PROCEDURE — 85027 COMPLETE CBC AUTOMATED: CPT

## 2022-04-30 PROCEDURE — 83615 LACTATE (LD) (LDH) ENZYME: CPT

## 2022-04-30 PROCEDURE — 74011250637 HC RX REV CODE- 250/637: Performed by: INTERNAL MEDICINE

## 2022-04-30 RX ORDER — WARFARIN SODIUM 5 MG/1
5 TABLET ORAL ONCE
Status: COMPLETED | OUTPATIENT
Start: 2022-04-30 | End: 2022-04-30

## 2022-04-30 RX ADMIN — SODIUM CHLORIDE, PRESERVATIVE FREE 10 ML: 5 INJECTION INTRAVENOUS at 05:38

## 2022-04-30 RX ADMIN — ASPIRIN 81 MG CHEWABLE TABLET 81 MG: 81 TABLET CHEWABLE at 08:56

## 2022-04-30 RX ADMIN — BUMETANIDE 2 MG: 1 TABLET ORAL at 08:56

## 2022-04-30 RX ADMIN — SACUBITRIL AND VALSARTAN 1 TABLET: 24; 26 TABLET, FILM COATED ORAL at 08:56

## 2022-04-30 RX ADMIN — WARFARIN SODIUM 5 MG: 5 TABLET ORAL at 16:39

## 2022-04-30 RX ADMIN — SUCRALFATE 1 G: 1 TABLET ORAL at 21:38

## 2022-04-30 RX ADMIN — Medication 400 MG: at 08:56

## 2022-04-30 RX ADMIN — HYDRALAZINE HYDROCHLORIDE 50 MG: 50 TABLET, FILM COATED ORAL at 21:38

## 2022-04-30 RX ADMIN — EPLERENONE 25 MG: 25 TABLET, FILM COATED ORAL at 08:55

## 2022-04-30 RX ADMIN — COLCHICINE 0.6 MG: 0.6 TABLET, FILM COATED ORAL at 08:55

## 2022-04-30 RX ADMIN — SODIUM CHLORIDE, PRESERVATIVE FREE 10 ML: 5 INJECTION INTRAVENOUS at 21:39

## 2022-04-30 RX ADMIN — ALBUMIN (HUMAN) 12.5 G: 0.25 INJECTION, SOLUTION INTRAVENOUS at 17:00

## 2022-04-30 RX ADMIN — ALBUMIN (HUMAN) 12.5 G: 0.25 INJECTION, SOLUTION INTRAVENOUS at 08:56

## 2022-04-30 RX ADMIN — MIRTAZAPINE 7.5 MG: 15 TABLET, FILM COATED ORAL at 21:38

## 2022-04-30 RX ADMIN — HYDRALAZINE HYDROCHLORIDE 50 MG: 50 TABLET, FILM COATED ORAL at 05:37

## 2022-04-30 RX ADMIN — GUAIFENESIN 100 MG: 200 SOLUTION ORAL at 00:47

## 2022-04-30 RX ADMIN — SUCRALFATE 1 G: 1 TABLET ORAL at 12:49

## 2022-04-30 RX ADMIN — FAMOTIDINE 20 MG: 20 TABLET ORAL at 21:38

## 2022-04-30 RX ADMIN — SUCRALFATE 1 G: 1 TABLET ORAL at 07:21

## 2022-04-30 RX ADMIN — SACUBITRIL AND VALSARTAN 1 TABLET: 24; 26 TABLET, FILM COATED ORAL at 21:38

## 2022-04-30 RX ADMIN — SUCRALFATE 1 G: 1 TABLET ORAL at 16:39

## 2022-04-30 RX ADMIN — FAMOTIDINE 20 MG: 20 TABLET ORAL at 08:56

## 2022-04-30 RX ADMIN — BUMETANIDE 2 MG: 1 TABLET ORAL at 16:39

## 2022-04-30 RX ADMIN — SODIUM CHLORIDE, PRESERVATIVE FREE 10 ML: 5 INJECTION INTRAVENOUS at 16:41

## 2022-04-30 RX ADMIN — AMIODARONE HYDROCHLORIDE 200 MG: 200 TABLET ORAL at 08:56

## 2022-04-30 RX ADMIN — HYDRALAZINE HYDROCHLORIDE 50 MG: 50 TABLET, FILM COATED ORAL at 12:49

## 2022-04-30 NOTE — PROGRESS NOTES
1930: Bedside and Verbal shift change report given to [address] and Swetha RN (oncoming nurse) by Jitendra Thomas (offgoing nurse). Report included the following information SBAR, Kardex, Intake/Output, MAR, Recent Results, and Cardiac Rhythm Sinus Arrhythmia . Problem: Falls - Risk of  Goal: *Absence of Falls  Description: Document Adeline Farooq Fall Risk and appropriate interventions in the flowsheet. Outcome: Progressing Towards Goal  Note: Fall Risk Interventions:  Mobility Interventions: Bed/chair exit alarm         Medication Interventions: Bed/chair exit alarm,Patient to call before getting OOB,Teach patient to arise slowly    Elimination Interventions: Bed/chair exit alarm,Call light in reach,Patient to call for help with toileting needs,Stay With Me (per policy),Urinal in reach              Problem: Patient Education: Go to Patient Education Activity  Goal: Patient/Family Education  Outcome: Progressing Towards Goal     Problem: Diabetes Self-Management  Goal: *Disease process and treatment process  Description: Define diabetes and identify own type of diabetes; list 3 options for treating diabetes. Outcome: Progressing Towards Goal  Goal: *Developing strategies to promote health/change behavior  Description: Define the ABC's of diabetes; identify appropriate screenings, schedule and personal plan for screenings. Outcome: Progressing Towards Goal  Goal: *Using medications safely  Description: State effect of diabetes medications on diabetes; name diabetes medication taking, action and side effects. Outcome: Progressing Towards Goal  Goal: *Monitoring blood glucose, interpreting and using results  Description: Identify recommended blood glucose targets  and personal targets. Outcome: Progressing Towards Goal     Problem: Pressure Injury - Risk of  Goal: *Prevention of pressure injury  Description: Document Jl Scale and appropriate interventions in the flowsheet.   Outcome: Progressing Towards Goal  Note: Pressure Injury Interventions:  Sensory Interventions: Chair cushion    Moisture Interventions: Absorbent underpads,Minimize layers    Activity Interventions: Increase time out of bed    Mobility Interventions: Pressure redistribution bed/mattress (bed type)    Nutrition Interventions: Document food/fluid/supplement intake    Friction and Shear Interventions: Trapeze to reposition                Problem: Patient Education: Go to Patient Education Activity  Goal: Patient/Family Education  Outcome: Progressing Towards Goal     Problem: Patient Education: Go to Patient Education Activity  Goal: Patient/Family Education  Outcome: Progressing Towards Goal     Problem: Heart Failure: Day 5  Goal: Activity/Safety  Outcome: Progressing Towards Goal  Goal: Diagnostic Test/Procedures  Outcome: Progressing Towards Goal  Goal: Nutrition/Diet  Outcome: Progressing Towards Goal  Goal: Discharge Planning  Outcome: Progressing Towards Goal  Goal: Medications  Outcome: Progressing Towards Goal  Goal: Respiratory  Outcome: Progressing Towards Goal  Goal: Treatments/Interventions/Procedures  Outcome: Progressing Towards Goal  Goal: Psychosocial  Outcome: Progressing Towards Goal

## 2022-04-30 NOTE — PROGRESS NOTES
[facility] in [place], [place]  Inpatient Progress Note      Patient name: Abdi Hartley  Patient : 1945  Patient MRN: [id number]  Consulting MD: Ne Rosario MD  Date of service: 22      REASON FOR REFERRAL:  Management of chronic systolic heart failure    PLAN OF CARE:  · 69 y.o. male with severe ischemic cardiomyopathy, LVEF 15-20% s/p HM3 LVAD implant as DT on 22 by Dr. Andrea Mcdonnell c/b intraoperative bleeding requiring multiple blood products and subsequent RV failure with severe TR on high dose dobutamine IV  · Pt reports that he \"does not want to go home attached to anything other than the LVAD\"; is not interested in [address] catheter or IV dobutamine at home- will work towards weaning dobut if able, and discuss plans for CT removal and monitoring of effusions with CT surgery  · Plan for remainder of hospitalization includes:  ? Surveillance weekly echos to determine if RV function improves and TV coapts  ? Will likely need Aspira cath prior to D/C- TBD after discussion with CT surgery  ? PT/OT/Nutrition  ?  Optimization of GDMT       RECOMMENDATIONS:  POD 23  TTE  shows severe TR, severe RV dysfunction; continue to optimize medically   Continue speed 4800rpms, low speed 4400  Trial weaning dobutamine, will decrease to 5 mcg/kg/min today and monitor VAD parameters, MAP and symptoms  Pt will likely need to D/C home on dobutamine as bridge to RV recovery   Continue hydralazine 50 mg PO q8h   Continue amiodarone 200 mg PO daily x 2 weeks   Continue Entresto 24/26 mg PO BID; if BP ok tomorrow, will increase to 49/51mg BID  Consider addition of digoxin for RV support   Cont Eplerenone 25mg daily  Continue Bumex 2 mg PO BID    Completed course of cefepime for sternal incision  Driveline dressing changes daily for now until 5/3/22  Needs EKG weekly   Continue warfarin, goal INR 2-3; 5 mg tonight   ASA 81mg daily  Continue colchicine for pericarditis D/C gabapentin- pt reports tremors with this and does not want to take it. IV fentanyl prn (pt with hx anaphylaxis to oxycodone)  PRN toradol  Continue bowel regimen   Per Dr. Jaimee Mauro, plan to keep CTs in until Monday and then assess need for Aspira cath next week   CPAP qHS  Advanced care plan forms on file   Strict I/O, daily weights, Na+ restricted diet   Continue VAD education   Equipment ordered  Tentative D/C date next week       INTERVAL HISTORY:      IMPRESSION:  POD 24 S/p LVAD implant as DT  · 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  · Persistent large volume chest tube output  Chronic systolic heart failure  · Stage D, NYHA class IV symptoms  · Non-ischemic cardiomyopathy, LVEF 20% with LVEDD 6.2  · RV dysfunction, TAPSE 1.22 (prelimnary read)  · TBili 1.5 likely from cardiac congestion  At risk of sudden cardiac death  · Recent cardiac arrest   · S/p ICD (1/2013, [employer] followed by [facility]); New implant on 10/21/2021 [employer]  Coronary artery disease  · S/p multiple interventions  · S/p 4V CABG (8/2012)  · LHC (7/2019) severe stenosis of LIMA to LAD anastomosis site, SVG graft to OM is occluded, SVG graft to RCA 40-50%, LAD occluded proximally, severe proximal LCx, RCA is the long . · PET (6/2019) EF 24% with anterior lateral and inferior reversible defect  Cardiac risk factors  · HTN  · HL  · DM2  · SRUTHI on CPAP  · MIld carotid stenosis  Anemia, microcytic  · Iron deficiency  DVT with filter  Pulmonary nodules   Dysphagia       LIFE GOALS:  Patient's personal goals include: getting stronger and going home soon  Important upcoming milestones or family events:    The patient identifies the following as important for living well: being independent, being at home, enjoying family time      [facility]:  Echo 4/26/22 - LVIDd 4.9 cm, TAPSE 0.5 cm,   Echo 4/18/22- LVEF 15-20%, severe TR, TAPSE 0.5cm, RVIDd 6cm   Echo (4/8/22)    Left Ventricle: Left ventricle size is normal. Moderately increased wall thickness. Findings consistent with concentric remodeling. Severe global hypokinesis present. Severely reduced left ventricular systolic function with a visually estimated EF of 10 -15%. 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   Aortic Valve: Moderate sclerosis of the aortic valve cusp.   Mitral Valve: Moderately thickened leaflet. Mild transvalvular regurgitation.   Tricuspid Valve: Severe transvalvular regurgitation.   Right Atrium: Right atrium is severely dilated.     Echo (3/2/22)   · LV 10-15%  · Mod-severe MR      Echo (11/23/21):  · LV 15-20%.    · Mod-severe, MR, mod-TR     Echo (5/20/21)  · LV: Estimated LVEF is 20 - 25%. Normal wall thickness. Mildly dilated left ventricle. Severely and globally reduced systolic function. Severe (grade 4) left ventricular diastolic dysfunction. · LA: Severely dilated left atrium. · RA: Severely dilated right atrium. · MV: Moderate mitral valve regurgitation is present. · TV: Moderate tricuspid valve regurgitation is present. · AO: Mild aortic root dilatation. · RV: Pacer/ICD present. · LVED 6.2cm     EKG (5/20/21) SB with 1degree AV block, QRS 116ms     LHC (5/24/21) Native Coronaries: LM - moderate to severe distal disease 50%. % prox occluded (), heavily calcified, LCx: 80% proximal stenosis. OM1 is  (seen filling faintly via collaterals). OM2 99% stenosis. RCA: 100% proximal occluded.  LIMA to LAD: patent, supplies only a diagonal branch (probably D2). The LAD distal to the bifurcation is 100% occluded () and fills via right to left collaterals off the SVG to RCA. SVG to R-PDA: patent with moderate diffuse irregularities but no obstructive disease in the graft.    No appealing interventional targets.        BRIEF HISTORY OF PRESENT ILLNESS:  Nette Peterson is a 69 y.o. male with h/o HTN, HL, DM2, SRUTHI on CPAP, chronic systolic heart failure, stage D, NYHA class IV symptoms, non-ischemic cardiomyopathy, LVEF 20% with LVEDD 6.2, RV dysfunciton, TAPSE 1.22, TBili 1.5 likely from cardiac congestion, recent cardiac arrest s/p ICD (1/2013, [employer] followed by [address] [place] [address]), coronary artery disease s/p multiple interventions s/p 4V CABG (8/2012), LHC (7/2019) severe stenosis of LIMA to LAD anastomosis site, SVG graft to OM is occluded, SVG graft to RCA 40-50%, LAD occluded proximally, severe proximal LCx, RCA is the long . PET (6/2019) EF 24% with anterior lateral and inferior reversible defect. Anemia, microcytic with iron deficiency, DVT with filter.     Patient was referred to [facility] by Dr. Luisito Lazar in 2021 for evaluation of his candidacy for advanced therapies.     Patient agreed to inpatient initiation of palliative infusion of chronic inotropes and evaluation for LVAD-DT. Patient was admitted 5/2-5/25/21. Patient was started on milrinone infusion and had first part of LVAD evaluation completed. Right and left heart cath on 5/24/21 showed severe diffuse native vessel CAD, with patent grafts (LIMA to D2, SVG to RCA).  Pt was found to have pulmonary nodule during workup. Antiplatelet therapy was held during hospitalization and after discharge due to anticipated pulmonary nodule biopsy, bone marrow biopsy and EGD/C-Scope as part of LVAD workup. Pt found to have adenocarcinoma of the lung, and so LVAD was deferred pending treatment. Patient completed radiation treatment for adenocarcinoma of the lung. Hem-onc cleared patient for VAD implant with 90% 2 year prognosis.      He was most recently admitted for elective pre-op EGD and colonoscopy which he tolerated well; path negative for malignancy.      He presented to [facility] on 4/3 for admission for planned LVAD implant. Underwent LVAD implant on 4/5/22. Was re-do sternotomy, bleeding intra-op, required cryo, k-centra, FFP, Plt, and cellsaver in OR.   Had RV dysfunction noted intra-op; requiring lower VAD speed and dual inotrope support. Inotropic support was able to be decreased, but pt now remains on dobutamine at 6mcg/kg/min.      Pt ans wife have been doing well with LVAD education. Pt has been OOB, is eating well and moving his bowels. Therapeutic on warfarin. REVIEW OF SYSTEMS:  Review of Systems   Constitutional: Negative for chills, fever, malaise/fatigue and weight loss. Respiratory: Negative for cough and shortness of breath. Cardiovascular: Negative for chest pain, palpitations, orthopnea and leg swelling. Gastrointestinal: Negative for abdominal pain, constipation, diarrhea, heartburn, nausea and vomiting. Neurological: Positive for tremors. Negative for dizziness and weakness. PHYSICAL EXAM:  Visit Vitals  /88   Pulse 80   Temp 99.3 °F (37.4 °C)   Resp 20   Ht 6' 1\" (1.854 m)   Wt 177 lb 7.5 oz (80.5 kg)   SpO2 94%   BMI 23.41 kg/m²       LVAD INTERROGATION:  Device interrogated in person  Device function normal, normal flow, no events  LVAD   Pump Speed (RPM): 4800  Pump Flow (LPM): 3.6  MAP: 78  PI (Pulsitility Index): 5.8  Power: 3.1   Test: Yes  Back Up  at Bedside & Labeled: Yes  Power Module Test: No  Driveline Site Care: No  Driveline Dressing: Clean, Dry, and Intact  MAP in Therapeutic Range (Outpatient): Yes  Testing  Alarms Reviewed: Yes  Back up SC speed: 4800  Back up Low Speed Limit: 4450  Emergency Equipment with Patient?: Yes  Emergency procedures reviewed?: Yes  Drive line site inspected?: No  Drive line intergrity inspected?: Yes  Drive line dressing changed?: No      Physical Exam  Vitals reviewed. Constitutional:       General: He is not in acute distress. Appearance: Normal appearance. Cardiovascular:      Rate and Rhythm: Normal rate and regular rhythm. Comments: LVAD Hum noted on auscultation  Pulmonary:      Effort: Pulmonary effort is normal. No respiratory distress.    Abdominal: General: Bowel sounds are normal. There is no distension. Palpations: Abdomen is soft. Tenderness: There is no abdominal tenderness. Musculoskeletal:      Right lower leg: No edema. Left lower leg: No edema. Skin:     General: Skin is warm and dry. Capillary Refill: Capillary refill takes less than 2 seconds. Neurological:      General: No focal deficit present. Mental Status: He is alert and oriented to person, place, and time. Psychiatric:         Mood and Affect: Mood normal.         Behavior: Behavior normal.         Thought Content: Thought content normal.         Judgment: Judgment normal.              PAST MEDICAL HISTORY:  Past Medical History:   Diagnosis Date    CAD (coronary artery disease) '90 '97, '13 x 2    [place] [facility]    Calculus of kidney     Colonic polyps     Congestive heart failure, unspecified     Diabetes ([address]     Gastritis     Hypercholesterolemia     Sleep apnea        PAST SURGICAL HISTORY:  Past Surgical History:   Procedure Laterality Date    COLONOSCOPY  04/06/2011    16, due 21    COLONOSCOPY N/A 3/2/2022    COLONOSCOPY    :- [facility] ENDOSCOPY    ENDOSCOPY, COLON, DIAGNOSTIC      11, due 16    HX CORONARY ARTERY BYPASS GRAFT  8/22/12    x 4 vessel by MISSY Ramirez    HX HEENT      LASIK    HX PACEMAKER PLACEMENT  1/30/13    [facility] CARDIAC SURG PROCEDURE UNLIST  2012    x 4 vessels       FAMILY HISTORY:  Family History   Problem Relation Age of Onset    Heart Disease Mother         MI    Heart Disease Father         CAD & PVD    Lung Disease Father     Cancer Father         lung    Diabetes Maternal Grandmother     Heart Disease Other         CAD    Stroke Sister        SOCIAL HISTORY:  Social History     Socioeconomic History    Marital status:    Tobacco Use    Smoking status: Never Smoker    Smokeless tobacco: Never Used   Vaping Use    Vaping Use: Never used   Substance and Sexual Activity    Alcohol use: Yes     Alcohol/week: 0.0 standard drinks     Comment:  VERY RARE    Drug use: No       LABORATORY RESULTS:     Labs Latest Ref Rng & Units 4/30/2022 4/29/2022 4/28/2022 4/27/2022 4/26/2022 4/25/2022 4/25/2022   WBC 4.1 - 11.1 K/uL 5.6 5.9 4.8 5.0 4.7 6.5 5.4   RBC 4.10 - 5.70 M/uL 2.95(L) 3.23(L) 2.91(L) 2.99(L) 2.96(L) 3.29(L) 2.96(L)   Hemoglobin 12.1 - 17.0 g/dL 7. 6(L) 8.4(L) 7. 5(L) 7. 7(L) 7. 8(L) 8.6(L) 7. 6(L)   Hematocrit 36.6 - 50.3 % 24. 6(L) 26. 6(L) 24. 2(L) 25. 0(L) 24. 8(L) 27. 6(L) 24. 5(L)   MCV 80.0 - 99.0 FL 83.4 82.4 83.2 83.6 83.8 83.9 82.8   Platelets 025 - 211 K/uL 275 299 261 257 259 269 249   Lymphocytes 12 - 49 % - - - - - - -   Monocytes 5 - 13 % - - - - - - -   Eosinophils 0 - 7 % - - - - - - -   Basophils 0 - 1 % - - - - - - -   Bands 0 - 6 % - - - - - - -   Albumin 3.5 - 5.0 g/dL 2. 8(L) 3.0(L) 2. 8(L) 2. 9(L) 2. 7(L) 3.0(L) -   Calcium 8.5 - 10.1 MG/DL 7. 7(L) 7. 9(L) 8.2(L) 8.6 8.2(L) 8.0(L) -   Glucose 65 - 100 mg/dL 125(H) 117(H) 93 92 109(H) 157(H) -   BUN 6 - 20 MG/DL 24(H) 23(H) 26(H) 26(H) 23(H) 21(H) -   Creatinine 0.70 - 1.30 MG/DL 1.01 1.00 1.08 0.96 0.94 0.90 -   Sodium 136 - 145 mmol/L 137 136 134(L) 135(L) 133(L) 132(L) -   Potassium 3.5 - 5.1 mmol/L 4.0 3.9 4.0 3.9 4.0 4.1 -   TSH 0.36 - 3.74 uIU/mL - - - - - - -   LDH 85 - 241 U/L 158 176 188 169 190 - -   Some recent data might be hidden     Lab Results   Component Value Date/Time    TSH 2.26 04/10/2022 03:00 AM    TSH 1.68 03/01/2022 11:50 AM    TSH 1.47 05/26/2021 10:44 PM    TSH 1.97 05/20/2021 04:28 PM    TSH 1.41 02/09/2021 03:05 PM    TSH 1.740 08/14/2018 09:58 AM    TSH 1.710 10/18/2017 12:00 AM       ALLERGY:  Allergies   Allergen Reactions    Oxycodone Anaphylaxis    Pcn [Penicillins] Hives        CURRENT MEDICATIONS:    Current Facility-Administered Medications:     gabapentin (NEURONTIN) capsule 100 mg, 100 mg, Oral, BID, John Wong NP, 100 mg at 04/29/22 1817    magnesium oxide (MAG-OX) tablet 400 mg, 400 mg, Oral, DAILY, Polliard, Polo Gerda T, NP, 400 mg at 04/30/22 0856    bumetanide (BUMEX) tablet 2 mg, 2 mg, Oral, BID, Polliard, Polo Gerda T, NP, 2 mg at 04/30/22 0856    sacubitriL-valsartan (ENTRESTO) 24-26 mg tablet 1 Tablet, 1 Tablet, Oral, Q12H, Alexandre Wong NP, 1 Tablet at 04/30/22 9050    amiodarone (CORDARONE) tablet 200 mg, 200 mg, Oral, DAILY, Polliard, Polo Gerda T, NP, 200 mg at 04/30/22 0856    DOBUTamine (DOBUTREX) 1,000 mg/250 mL (4,000 mcg/mL) infusion, 6 mcg/kg/min, IntraVENous, CONTINUOUS, Zeyad Wong NP, Last Rate: 7.5 mL/hr at 04/29/22 2050, 6 mcg/kg/min at 04/29/22 2050    ketorolac (TORADOL) injection 15 mg, 15 mg, IntraVENous, Q6H PRN, Marvin Polo Gerda T, NP, 15 mg at 04/29/22 1402    sodium chloride (OCEAN) 0.65 % nasal squeeze bottle 2 Spray, 2 Spray, Both Nostrils, Q2H PRN, Terry, Danica B, NP    docusate sodium (COLACE) capsule 100 mg, 100 mg, Oral, DAILY, Terry, Danica B, NP, 100 mg at 04/29/22 0956    eplerenone (INSPRA) tablet 25 mg, 25 mg, Oral, DAILY, Terry, Danica B, NP, 25 mg at 04/30/22 0855    guaiFENesin (ROBITUSSIN) 100 mg/5 mL oral liquid 100 mg, 100 mg, Oral, Q4H PRN, Gayathri Coelho MD, 100 mg at 04/30/22 0047    polyethylene glycol (MIRALAX) packet 17 g, 17 g, Oral, DAILY, Genella Nose B, NP, 17 g at 04/29/22 1000    senna-docusate (PERICOLACE) 8.6-50 mg per tablet 1 Tablet, 1 Tablet, Oral, DAILY PRN, Genella Nose B, NP, 1 Tablet at 04/24/22 0913    oxymetazoline (AFRIN) 0.05 % nasal spray 2 Spray, 2 Spray, Both Nostrils, BID PRN, Genella Nose B, NP, 2 Spray at 04/24/22 1612    hydrALAZINE (APRESOLINE) tablet 50 mg, 50 mg, Oral, Q8H, Millie Wong, NP, 50 mg at 04/30/22 6900    colchicine tablet 0.6 mg, 0.6 mg, Oral, DAILY, Zeyad Wong NP, 0.6 mg at 04/30/22 0855    alteplase (CATHFLO) 1 mg in sterile water (preservative free) 1 mL injection, 1 mg, InterCATHeter, PRN, Claus Cabrera MD, 1 mg at 04/25/22 2136    bisacodyL (DULCOLAX) suppository 10 mg, 10 mg, Rectal, DAILY PRN, Aubrey Horn MD, 10 mg at 04/12/22 1533    albumin human 25% (BUMINATE) solution 12.5 g, 12.5 g, IntraVENous, BID, Polliard, Soheila Singh T, NP, 12.5 g at 04/30/22 0856    famotidine (PEPCID) tablet 20 mg, 20 mg, Oral, Q12H, Aubrey Horn MD, 20 mg at 04/30/22 0856    mirtazapine (REMERON) tablet 7.5 mg, 7.5 mg, Oral, QHS, Terry, Danica B, NP, 7.5 mg at 04/29/22 2157    lidocaine 4 % patch 1 Patch, 1 Patch, TransDERmal, Q24H, Terry Danica B, NP, 1 Patch at 04/29/22 1225    Warfarin NP/MD dosing, , Other, PRN, Terry, Danica B, NP    melatonin tablet 3 mg, 3 mg, Oral, QHS PRN, Tamanna Kellogg MD, 3 mg at 04/20/22 2127    hydrALAZINE (APRESOLINE) 20 mg/mL injection 10 mg, 10 mg, IntraVENous, Q6H PRN, Ed[place]a [place] B, NP, 10 mg at 04/24/22 1415    aspirin chewable tablet 81 mg, 81 mg, Oral, DAILY, [place]a [place] B, NP, 81 mg at 04/30/22 0856    hydrALAZINE (APRESOLINE) 20 mg/mL injection 5 mg, 5 mg, IntraVENous, Q6H PRN, Edwyna [place] B, NP, 5 mg at 04/13/22 0015    sodium chloride (NS) flush 5-40 mL, 5-40 mL, IntraVENous, Q8H, Millie Wong, NP, 10 mL at 04/30/22 0538    sodium chloride (NS) flush 5-40 mL, 5-40 mL, IntraVENous, PRN, PollSoheila soliman T, NP, 10 mL at 04/24/22 0914    0.9% sodium chloride infusion, 9 mL/hr, IntraVENous, CONTINUOUS, Soheila Wong NP, Last Rate: 3 mL/hr at 04/20/22 0513, 3 mL/hr at 04/20/22 0513    acetaminophen (TYLENOL) tablet 650 mg, 650 mg, Oral, Q6H PRN, Millie Wong NP, 650 mg at 04/29/22 2157    naloxone [facility]) injection 0.4 mg, 0.4 mg, IntraVENous, PRN, Emelia Wong NP    ondansetron [facility]) injection 4 mg, 4 mg, IntraVENous, Q4H PRN, Soheila Wong NP, 4 mg at 04/11/22 2139    albuterol-ipratropium (DUO-NEB) 2.5 MG-0.5 MG/3 ML, 3 mL, Nebulization, Q6H PRN, Emelia Wong, NP    glucose chewable tablet 16 g, 4 Tablet, Oral, PRN, Alexandre Wong NP    glucagon (GLUCAGEN) injection 1 mg, 1 mg, IntraMUSCular, PRN, Alexandre Wong NP    insulin lispro (HUMALOG) injection, , SubCUTAneous, AC&HS, Alexandre Wong NP, 2 Units at 04/24/22 1310    sucralfate (CARAFATE) tablet 1 g, 1 g, Oral, AC&HS, Alexandre Wong, NP, 1 g at 04/30/22 8490    0.45% sodium chloride infusion, 10 mL/hr, IntraVENous, CONTINUOUS, Rachell Najera MD, Stopped at 04/10/22 0730    fentaNYL citrate (PF) injection 25 mcg, 25 mcg, IntraVENous, Q2H PRN, Alexandre Wong NP, 25 mcg at 04/25/22 1251    PATIENT CARE TEAM:  Patient Care Team:  Frank Erickson MD as PCP - General (Internal Medicine)  Frank Erickson MD as PCP - REHABILITATION [facility]  Cierra Delaney MD as Physician (Cardiology)  Juventino Dukes MD as Physician (Gastroenterology)  Astrid Bennett MD as Physician (Orthopedic Surgery)  Corrie Cheng MD as Physician (Ophthalmology)  Danika Parker MD (Cardiology)  Aydee Guan MD (Cardiology)     Thank you for allowing us to participate in this patient's care. Alessandra Seymour, MSN, AGACNP-[address], [address]  Phone: [phone]  Fax: [phone]

## 2022-04-30 NOTE — PROGRESS NOTES
Preceptor Review of RN Work    4/30/2022  - Shift times - 1930 to 0800    The RN documentation of patient care for Radha Bartlett has been reviewed and approved. All medications have been administered under the direct supervision of the preceptor.     Nakita Juan RN

## 2022-05-01 ENCOUNTER — APPOINTMENT (OUTPATIENT)
Dept: GENERAL RADIOLOGY | Age: 77
DRG: 001 | End: 2022-05-01
Attending: NURSE PRACTITIONER
Payer: MEDICARE

## 2022-05-01 LAB
ALBUMIN SERPL-MCNC: 2.6 G/DL (ref 3.5–5)
ALBUMIN/GLOB SERPL: 1 {RATIO} (ref 1.1–2.2)
ALP SERPL-CCNC: 150 U/L (ref 45–117)
ALT SERPL-CCNC: 22 U/L (ref 12–78)
ANION GAP SERPL CALC-SCNC: 9 MMOL/L (ref 5–15)
AST SERPL-CCNC: 55 U/L (ref 15–37)
BILIRUB SERPL-MCNC: 1.3 MG/DL (ref 0.2–1)
BNP SERPL-MCNC: 2105 PG/ML
BUN SERPL-MCNC: 17 MG/DL (ref 6–20)
BUN/CREAT SERPL: 20 (ref 12–20)
CALCIUM SERPL-MCNC: 8 MG/DL (ref 8.5–10.1)
CHLORIDE SERPL-SCNC: 97 MMOL/L (ref 97–108)
CO2 SERPL-SCNC: 27 MMOL/L (ref 21–32)
CREAT SERPL-MCNC: 0.86 MG/DL (ref 0.7–1.3)
ERYTHROCYTE [DISTWIDTH] IN BLOOD BY AUTOMATED COUNT: 18.7 % (ref 11.5–14.5)
GLOBULIN SER CALC-MCNC: 2.7 G/DL (ref 2–4)
GLUCOSE BLD STRIP.AUTO-MCNC: 108 MG/DL (ref 65–117)
GLUCOSE BLD STRIP.AUTO-MCNC: 108 MG/DL (ref 65–117)
GLUCOSE BLD STRIP.AUTO-MCNC: 150 MG/DL (ref 65–117)
GLUCOSE BLD STRIP.AUTO-MCNC: 228 MG/DL (ref 65–117)
GLUCOSE SERPL-MCNC: 199 MG/DL (ref 65–100)
HCT VFR BLD AUTO: 24.7 % (ref 36.6–50.3)
HGB BLD-MCNC: 7.6 G/DL (ref 12.1–17)
INR PPP: 2.3 (ref 0.9–1.1)
LDH SERPL L TO P-CCNC: 177 U/L (ref 85–241)
MAGNESIUM SERPL-MCNC: 2.2 MG/DL (ref 1.6–2.4)
MCH RBC QN AUTO: 26 PG (ref 26–34)
MCHC RBC AUTO-ENTMCNC: 30.8 G/DL (ref 30–36.5)
MCV RBC AUTO: 84.6 FL (ref 80–99)
NRBC # BLD: 0 K/UL (ref 0–0.01)
NRBC BLD-RTO: 0 PER 100 WBC
PLATELET # BLD AUTO: 274 K/UL (ref 150–400)
PMV BLD AUTO: 9.8 FL (ref 8.9–12.9)
POTASSIUM SERPL-SCNC: 3.7 MMOL/L (ref 3.5–5.1)
PROT SERPL-MCNC: 5.3 G/DL (ref 6.4–8.2)
PROTHROMBIN TIME: 22.4 SEC (ref 9–11.1)
RBC # BLD AUTO: 2.92 M/UL (ref 4.1–5.7)
SERVICE CMNT-IMP: ABNORMAL
SERVICE CMNT-IMP: ABNORMAL
SERVICE CMNT-IMP: NORMAL
SERVICE CMNT-IMP: NORMAL
SODIUM SERPL-SCNC: 133 MMOL/L (ref 136–145)
WBC # BLD AUTO: 5.5 K/UL (ref 4.1–11.1)

## 2022-05-01 PROCEDURE — 83615 LACTATE (LD) (LDH) ENZYME: CPT

## 2022-05-01 PROCEDURE — 80053 COMPREHEN METABOLIC PANEL: CPT

## 2022-05-01 PROCEDURE — 65660000001 HC RM ICU INTERMED STEPDOWN

## 2022-05-01 PROCEDURE — P9047 ALBUMIN (HUMAN), 25%, 50ML: HCPCS | Performed by: NURSE PRACTITIONER

## 2022-05-01 PROCEDURE — 74011250637 HC RX REV CODE- 250/637: Performed by: INTERNAL MEDICINE

## 2022-05-01 PROCEDURE — 82962 GLUCOSE BLOOD TEST: CPT

## 2022-05-01 PROCEDURE — 71045 X-RAY EXAM CHEST 1 VIEW: CPT

## 2022-05-01 PROCEDURE — 83735 ASSAY OF MAGNESIUM: CPT

## 2022-05-01 PROCEDURE — 74011636637 HC RX REV CODE- 636/637: Performed by: NURSE PRACTITIONER

## 2022-05-01 PROCEDURE — 85027 COMPLETE CBC AUTOMATED: CPT

## 2022-05-01 PROCEDURE — 74011250637 HC RX REV CODE- 250/637: Performed by: NURSE PRACTITIONER

## 2022-05-01 PROCEDURE — 74011250636 HC RX REV CODE- 250/636: Performed by: NURSE PRACTITIONER

## 2022-05-01 PROCEDURE — 85610 PROTHROMBIN TIME: CPT

## 2022-05-01 PROCEDURE — 74011000250 HC RX REV CODE- 250: Performed by: NURSE PRACTITIONER

## 2022-05-01 PROCEDURE — 83880 ASSAY OF NATRIURETIC PEPTIDE: CPT

## 2022-05-01 PROCEDURE — 36415 COLL VENOUS BLD VENIPUNCTURE: CPT

## 2022-05-01 PROCEDURE — 99233 SBSQ HOSP IP/OBS HIGH 50: CPT | Performed by: NURSE PRACTITIONER

## 2022-05-01 RX ORDER — WARFARIN SODIUM 5 MG/1
5 TABLET ORAL ONCE
Status: COMPLETED | OUTPATIENT
Start: 2022-05-01 | End: 2022-05-01

## 2022-05-01 RX ADMIN — ACETAMINOPHEN 650 MG: 325 TABLET ORAL at 07:39

## 2022-05-01 RX ADMIN — FAMOTIDINE 20 MG: 20 TABLET ORAL at 22:31

## 2022-05-01 RX ADMIN — SODIUM CHLORIDE, PRESERVATIVE FREE 10 ML: 5 INJECTION INTRAVENOUS at 16:34

## 2022-05-01 RX ADMIN — SUCRALFATE 1 G: 1 TABLET ORAL at 07:39

## 2022-05-01 RX ADMIN — WARFARIN SODIUM 5 MG: 5 TABLET ORAL at 17:43

## 2022-05-01 RX ADMIN — HYDRALAZINE HYDROCHLORIDE 50 MG: 50 TABLET, FILM COATED ORAL at 13:14

## 2022-05-01 RX ADMIN — BUMETANIDE 2 MG: 1 TABLET ORAL at 16:34

## 2022-05-01 RX ADMIN — HYDRALAZINE HYDROCHLORIDE 50 MG: 50 TABLET, FILM COATED ORAL at 05:07

## 2022-05-01 RX ADMIN — FENTANYL CITRATE 25 MCG: 0.05 INJECTION, SOLUTION INTRAMUSCULAR; INTRAVENOUS at 10:55

## 2022-05-01 RX ADMIN — SODIUM CHLORIDE, PRESERVATIVE FREE 10 ML: 5 INJECTION INTRAVENOUS at 22:20

## 2022-05-01 RX ADMIN — MIRTAZAPINE 7.5 MG: 15 TABLET, FILM COATED ORAL at 22:31

## 2022-05-01 RX ADMIN — FENTANYL CITRATE 25 MCG: 0.05 INJECTION, SOLUTION INTRAMUSCULAR; INTRAVENOUS at 17:43

## 2022-05-01 RX ADMIN — DOCUSATE SODIUM 100 MG: 100 CAPSULE, LIQUID FILLED ORAL at 09:49

## 2022-05-01 RX ADMIN — SUCRALFATE 1 G: 1 TABLET ORAL at 17:43

## 2022-05-01 RX ADMIN — EPLERENONE 25 MG: 25 TABLET, FILM COATED ORAL at 09:48

## 2022-05-01 RX ADMIN — FENTANYL CITRATE 25 MCG: 0.05 INJECTION, SOLUTION INTRAMUSCULAR; INTRAVENOUS at 19:34

## 2022-05-01 RX ADMIN — SODIUM CHLORIDE, PRESERVATIVE FREE 10 ML: 5 INJECTION INTRAVENOUS at 07:39

## 2022-05-01 RX ADMIN — FENTANYL CITRATE 25 MCG: 0.05 INJECTION, SOLUTION INTRAMUSCULAR; INTRAVENOUS at 22:20

## 2022-05-01 RX ADMIN — SUCRALFATE 1 G: 1 TABLET ORAL at 22:31

## 2022-05-01 RX ADMIN — COLCHICINE 0.6 MG: 0.6 TABLET, FILM COATED ORAL at 09:49

## 2022-05-01 RX ADMIN — BUMETANIDE 2 MG: 1 TABLET ORAL at 09:48

## 2022-05-01 RX ADMIN — ALBUMIN (HUMAN) 12.5 G: 0.25 INJECTION, SOLUTION INTRAVENOUS at 09:49

## 2022-05-01 RX ADMIN — AMIODARONE HYDROCHLORIDE 200 MG: 200 TABLET ORAL at 09:49

## 2022-05-01 RX ADMIN — HYDRALAZINE HYDROCHLORIDE 50 MG: 50 TABLET, FILM COATED ORAL at 22:39

## 2022-05-01 RX ADMIN — FAMOTIDINE 20 MG: 20 TABLET ORAL at 09:48

## 2022-05-01 RX ADMIN — Medication 400 MG: at 09:48

## 2022-05-01 RX ADMIN — SUCRALFATE 1 G: 1 TABLET ORAL at 13:15

## 2022-05-01 RX ADMIN — ALBUMIN (HUMAN) 12.5 G: 0.25 INJECTION, SOLUTION INTRAVENOUS at 17:43

## 2022-05-01 RX ADMIN — Medication 2 UNITS: at 13:14

## 2022-05-01 RX ADMIN — SACUBITRIL AND VALSARTAN 1 TABLET: 24; 26 TABLET, FILM COATED ORAL at 09:48

## 2022-05-01 RX ADMIN — ASPIRIN 81 MG CHEWABLE TABLET 81 MG: 81 TABLET CHEWABLE at 09:48

## 2022-05-01 RX ADMIN — SACUBITRIL AND VALSARTAN 1 TABLET: 49; 51 TABLET, FILM COATED ORAL at 22:31

## 2022-05-01 RX ADMIN — DOBUTAMINE HYDROCHLORIDE 5 MCG/KG/MIN: 400 INJECTION INTRAVENOUS at 02:30

## 2022-05-01 NOTE — PROGRESS NOTES
[facility] in [place], [place]  Inpatient Progress Note      Patient name: Tate Casas  Patient : 1945  Patient MRN: [id number]  Consulting MD: Ade Denson MD  Date of service: 22      REASON FOR REFERRAL:  Management of chronic systolic heart failure    PLAN OF CARE:  · 69 y.o. male with severe ischemic cardiomyopathy, LVEF 15-20% s/p HM3 LVAD implant as DT on 22 by Dr. Nayan Baez c/b intraoperative bleeding requiring multiple blood products and subsequent RV failure with severe TR on high dose dobutamine IV  · Pt reports that he \"does not want to go home attached to anything other than the LVAD\"; is not interested in [address] catheter or IV dobutamine at home- will work towards weaning dobut if able, and discuss plans for CT removal and monitoring of effusions with CT surgery  · Plan for remainder of hospitalization includes:  ? Surveillance weekly echos to determine if RV function improves and TV coapts  ? Will likely need Aspira cath prior to D/C- TBD after discussion with CT surgery  ? PT/OT/Nutrition  ?  Optimization of GDMT       RECOMMENDATIONS:  POD 24  TTE  showed severe TR, severe RV dysfunction; continue to optimize medically   Continue speed 4800rpms, low speed 4400  Trial weaning dobutamine, decreased to 5 mcg/kg/min yesterday; pt w/o complaints or worsened symptoms; tbili up slightly, Cr and Pro BNP stable; continue  At 5mcg/kg/min today and will monitor VAD parameters, MAP and symptoms  Pt will likely need to D/C home on dobutamine as bridge to RV recovery  Continue hydralazine 50 mg PO q8h   Continue amiodarone 200 mg PO daily x 2 weeks   Increase Entresto to 49/51mg BID  Consider addition of digoxin for RV support   Cont Eplerenone 25mg daily  Continue Bumex 2 mg PO BID    Completed course of cefepime for sternal incision  Driveline dressing changes daily for now until 5/3/22  Needs EKG weekly   Continue warfarin, goal INR 2-3; 5 mg tonight   ASA 81mg daily  Continue colchicine for pericarditis   D/C'd gabapentin- pt reports tremors with this and does not want to take it. IV fentanyl prn (pt with hx anaphylaxis to oxycodone)  PRN toradol  Continue bowel regimen   Per Dr. Ankush Levy, plan to keep CTs in until Monday and then assess need for Aspira cath next week   CPAP qHS  Advanced care plan forms on file   Strict I/O, daily weights, Na+ restricted diet   Continue VAD education   Equipment ordered  Tentative D/C date next week       INTERVAL HISTORY:  Significant decrease in CT output, only 70mL last 24 hrs  Pt reports feeling well today  Net -800mL  VSS, Cr and Pro BNP stable  Tbili up slightly    IMPRESSION:  POD 24 S/p LVAD implant as DT  · 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  · Persistent large volume chest tube output  Chronic systolic heart failure  · Stage D, NYHA class IV symptoms  · Non-ischemic cardiomyopathy, LVEF 20% with LVEDD 6.2  · RV dysfunction, TAPSE 1.22 (prelimnary read)  · TBili 1.5 likely from cardiac congestion  At risk of sudden cardiac death  · Recent cardiac arrest   · S/p ICD (1/2013, [facility] followed by [address] [place] [address]); New implant on 10/21/2021 [email or web address]  Coronary artery disease  · S/p multiple interventions  · S/p 4V CABG (8/2012)  · LHC (7/2019) severe stenosis of LIMA to LAD anastomosis site, SVG graft to OM is occluded, SVG graft to RCA 40-50%, LAD occluded proximally, severe proximal LCx, RCA is the long . · PET (6/2019) EF 24% with anterior lateral and inferior reversible defect  Cardiac risk factors  · HTN  · HL  · DM2  · SRUTHI on CPAP  · MIld carotid stenosis  Anemia, microcytic  · Iron deficiency  DVT with filter  Pulmonary nodules   Dysphagia       LIFE GOALS:  Patient's personal goals include: getting stronger and going home soon  Important upcoming milestones or family events:    The patient identifies the following as important for living well: being independent, being at home, enjoying family time      [facility]:  Echo 4/26/22 - LVIDd 4.9 cm, TAPSE 0.5 cm,   Echo 4/18/22- LVEF 15-20%, severe TR, TAPSE 0.5cm, RVIDd 6cm   Echo (4/8/22)    Left Ventricle: Left ventricle size is normal. Moderately increased wall thickness. Findings consistent with concentric remodeling. Severe global hypokinesis present. Severely reduced left ventricular systolic function with a visually estimated EF of 10 -15%. 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   Aortic Valve: Moderate sclerosis of the aortic valve cusp.   Mitral Valve: Moderately thickened leaflet. Mild transvalvular regurgitation.   Tricuspid Valve: Severe transvalvular regurgitation.   Right Atrium: Right atrium is severely dilated.     Echo (3/2/22)   · LV 10-15%  · Mod-severe MR      Echo (11/23/21):  · LV 15-20%.    · Mod-severe, MR, mod-TR     Echo (5/20/21)  · LV: Estimated LVEF is 20 - 25%. Normal wall thickness. Mildly dilated left ventricle. Severely and globally reduced systolic function. Severe (grade 4) left ventricular diastolic dysfunction. · LA: Severely dilated left atrium. · RA: Severely dilated right atrium. · MV: Moderate mitral valve regurgitation is present. · TV: Moderate tricuspid valve regurgitation is present. · AO: Mild aortic root dilatation. · RV: Pacer/ICD present. · LVED 6.2cm     EKG (5/20/21) SB with 1degree AV block, QRS 116ms     LHC (5/24/21) Native Coronaries: LM - moderate to severe distal disease 50%. % prox occluded (), heavily calcified, LCx: 80% proximal stenosis. OM1 is  (seen filling faintly via collaterals). OM2 99% stenosis. RCA: 100% proximal occluded.  LIMA to LAD: patent, supplies only a diagonal branch (probably D2).  The LAD distal to the bifurcation is 100% occluded () and fills via right to left collaterals off the SVG to RCA. SVG to R-PDA: patent with moderate diffuse irregularities but no obstructive disease in the graft.    No appealing interventional targets. BRIEF HISTORY OF PRESENT ILLNESS:  Morgan Davey is a 69 y.o. male with h/o HTN, HL, DM2, SRUTHI on CPAP, chronic systolic heart failure, stage D, NYHA class IV symptoms, non-ischemic cardiomyopathy, LVEF 20% with LVEDD 6.2, RV dysfunciton, TAPSE 1.22, TBili 1.5 likely from cardiac congestion, recent cardiac arrest s/p ICD (1/2013, [facility]), coronary artery disease s/p multiple interventions s/p 4V CABG (8/2012), LHC (7/2019) severe stenosis of LIMA to LAD anastomosis site, SVG graft to OM is occluded, SVG graft to RCA 40-50%, LAD occluded proximally, severe proximal LCx, RCA is the long . PET (6/2019) EF 24% with anterior lateral and inferior reversible defect. Anemia, microcytic with iron deficiency, DVT with filter.     Patient was referred to [facility] by Dr. Danie Kilgore in 2021 for evaluation of his candidacy for advanced therapies.     Patient agreed to inpatient initiation of palliative infusion of chronic inotropes and evaluation for LVAD-DT. Patient was admitted 5/2-5/25/21. Patient was started on milrinone infusion and had first part of LVAD evaluation completed. Right and left heart cath on 5/24/21 showed severe diffuse native vessel CAD, with patent grafts (LIMA to D2, SVG to RCA).  Pt was found to have pulmonary nodule during workup. Antiplatelet therapy was held during hospitalization and after discharge due to anticipated pulmonary nodule biopsy, bone marrow biopsy and EGD/C-Scope as part of LVAD workup. Pt found to have adenocarcinoma of the lung, and so LVAD was deferred pending treatment. Patient completed radiation treatment for adenocarcinoma of the lung.  Hem-onc cleared patient for VAD implant with 90% 2 year prognosis.      He was most recently admitted for elective pre-op EGD and colonoscopy which he tolerated well; path negative for malignancy.      He presented to [facility] on 4/3 for admission for planned LVAD implant. Underwent LVAD implant on 4/5/22. Was re-do sternotomy, bleeding intra-op, required cryo, k-centra, FFP, Plt, and cellsaver in OR. Had RV dysfunction noted intra-op; requiring lower VAD speed and dual inotrope support. Inotropic support was able to be decreased, but pt now remains on dobutamine at 6mcg/kg/min.      Pt ans wife have been doing well with LVAD education. Pt has been OOB, is eating well and moving his bowels. Therapeutic on warfarin. REVIEW OF SYSTEMS:  Review of Systems   Constitutional: Negative for chills, fever, malaise/fatigue and weight loss. Respiratory: Negative for cough and shortness of breath. Cardiovascular: Negative for chest pain, palpitations, orthopnea and leg swelling. Gastrointestinal: Negative for abdominal pain, constipation, diarrhea, heartburn, nausea and vomiting. Neurological: Positive for tremors. Negative for dizziness and weakness. PHYSICAL EXAM:  Visit Vitals  /88   Pulse 84   Temp 98.6 °F (37 °C)   Resp 17   Ht 6' 1\" (1.854 m)   Wt 175 lb 7.8 oz (79.6 kg)   SpO2 97%   BMI 23.15 kg/m²       LVAD INTERROGATION:  Device interrogated in person  Device function normal, normal flow, no events  LVAD   Pump Speed (RPM): 4800  Pump Flow (LPM): 4.1  MAP: 84  PI (Pulsitility Index): 4  Power: 3.2   Test: No  Back Up  at Bedside & Labeled: Yes  Power Module Test: No  Driveline Site Care: No  Driveline Dressing: Clean, Dry, and Intact  MAP in Therapeutic Range (Outpatient): Yes  Testing  Alarms Reviewed: Yes  Back up SC speed: 4800  Back up Low Speed Limit: 4400  Emergency Equipment with Patient?: Yes  Emergency procedures reviewed?: Yes  Drive line site inspected?: No  Drive line intergrity inspected?: Yes  Drive line dressing changed?: No      Physical Exam  Vitals reviewed. Constitutional:       General: He is not in acute distress. Appearance: Normal appearance. Cardiovascular:      Rate and Rhythm: Normal rate and regular rhythm. Comments: LVAD Hum noted on auscultation  Pulmonary:      Effort: Pulmonary effort is normal. No respiratory distress. Abdominal:      General: Bowel sounds are normal. There is no distension. Palpations: Abdomen is soft. Tenderness: There is no abdominal tenderness. Musculoskeletal:      Right lower leg: No edema. Left lower leg: No edema. Skin:     General: Skin is warm and dry. Capillary Refill: Capillary refill takes less than 2 seconds. Neurological:      General: No focal deficit present. Mental Status: He is alert and oriented to person, place, and time. Psychiatric:         Mood and Affect: Mood normal.         Behavior: Behavior normal.         Thought Content: Thought content normal.         Judgment: Judgment normal.              PAST MEDICAL HISTORY:  Past Medical History:   Diagnosis Date    CAD (coronary artery disease) '90 '97, '13 x 2    MI, code ice in 2013 at [facility]    Calculus of kidney     Colonic polyps     Congestive heart failure, unspecified     Diabetes ([address]     Gastritis     Hypercholesterolemia     Sleep apnea        PAST SURGICAL HISTORY:  Past Surgical History:   Procedure Laterality Date    COLONOSCOPY  04/06/2011    16, due 21    COLONOSCOPY N/A 3/2/2022    COLONOSCOPY    :- performed by Ike Lewis [facility] at [facility] ENDOSCOPY    ENDOSCOPY, COLON, DIAGNOSTIC      11, due 16    HX CORONARY ARTERY BYPASS GRAFT  8/22/12    x 4 vessel by S.  James    HX HEENT      LASIK    HX PACEMAKER PLACEMENT  1/30/13    HI CARDIAC SURG PROCEDURE UNLIST  2012    x 4 vessels       FAMILY HISTORY:  Family History   Problem Relation Age of Onset    Heart Disease Mother         MI    Heart Disease Father         CAD & PVD    Lung Disease Father     Cancer Father         lung    Diabetes Maternal Grandmother     Heart Disease Other         CAD    Stroke Sister        SOCIAL HISTORY:  Social History     Socioeconomic History    Marital status:    Tobacco Use    Smoking status: Never Smoker    Smokeless tobacco: Never Used   Vaping Use    Vaping Use: Never used   Substance and Sexual Activity    Alcohol use: Yes     Alcohol/week: 0.0 standard drinks     Comment:  VERY RARE    Drug use: No       LABORATORY RESULTS:     Labs Latest Ref Rng & Units 5/1/2022 4/30/2022 4/29/2022 4/28/2022 4/27/2022 4/26/2022 4/25/2022   WBC 4.1 - 11.1 K/uL 5.5 5.6 5.9 4.8 5.0 4.7 6.5   RBC 4.10 - 5.70 M/uL 2.92(L) 2.95(L) 3.23(L) 2.91(L) 2.99(L) 2.96(L) 3.29(L)   Hemoglobin 12.1 - 17.0 g/dL 7. 6(L) 7. 6(L) 8.4(L) 7. 5(L) 7. 7(L) 7. 8(L) 8.6(L)   Hematocrit 36.6 - 50.3 % 24. 7(L) 24. 6(L) 26. 6(L) 24. 2(L) 25. 0(L) 24. 8(L) 27. 6(L)   MCV 80.0 - 99.0 FL 84.6 83.4 82.4 83.2 83.6 83.8 83.9   Platelets 134 - 545 K/uL 274 275 299 261 257 259 269   Lymphocytes 12 - 49 % - - - - - - -   Monocytes 5 - 13 % - - - - - - -   Eosinophils 0 - 7 % - - - - - - -   Basophils 0 - 1 % - - - - - - -   Bands 0 - 6 % - - - - - - -   Albumin 3.5 - 5.0 g/dL 2. 6(L) 2. 8(L) 3.0(L) 2. 8(L) 2. 9(L) 2. 7(L) 3.0(L)   Calcium 8.5 - 10.1 MG/DL 8. 0(L) 7. 7(L) 7. 9(L) 8.2(L) 8.6 8.2(L) 8.0(L)   Glucose 65 - 100 mg/dL 199(H) 125(H) 117(H) 93 92 109(H) 157(H)   BUN 6 - 20 MG/DL 17 24(H) 23(H) 26(H) 26(H) 23(H) 21(H)   Creatinine 0.70 - 1.30 MG/DL 0.86 1.01 1.00 1.08 0.96 0.94 0.90   Sodium 136 - 145 mmol/L 133(L) 137 136 134(L) 135(L) 133(L) 132(L)   Potassium 3.5 - 5.1 mmol/L 3.7 4.0 3.9 4.0 3.9 4.0 4.1   TSH 0.36 - 3.74 uIU/mL - - - - - - -   LDH 85 - 241 U/L 177 158 176 188 169 190 -   Some recent data might be hidden     Lab Results   Component Value Date/Time    TSH 2.26 04/10/2022 03:00 AM    TSH 1.68 03/01/2022 11:50 AM    TSH 1.47 05/26/2021 10:44 PM    TSH 1.97 05/20/2021 04:28 PM    TSH 1.41 02/09/2021 03:05 PM    TSH 1.740 08/14/2018 09:58 AM    TSH 1.710 10/18/2017 12:00 AM       ALLERGY:  Allergies   Allergen Reactions    Oxycodone Anaphylaxis    Pcn [Penicillins] Hives        CURRENT MEDICATIONS:    Current Facility-Administered Medications:     magnesium oxide (MAG-OX) tablet 400 mg, 400 mg, Oral, DAILY, John Wong, NP, 400 mg at 05/01/22 0948    bumetanide (BUMEX) tablet 2 mg, 2 mg, Oral, BID, John Wong T, NP, 2 mg at 05/01/22 0948    sacubitriL-valsartan (ENTRESTO) 24-26 mg tablet 1 Tablet, 1 Tablet, Oral, Q12H, Caro Wong, NP, 1 Tablet at 05/01/22 9087    amiodarone (CORDARONE) tablet 200 mg, 200 mg, Oral, DAILY, John Wong T, NP, 200 mg at 05/01/22 0949    DOBUTamine (DOBUTREX) 1,000 mg/250 mL (4,000 mcg/mL) infusion, 5 mcg/kg/min, IntraVENous, CONTINUOUS, Cindy Shepherd B, NP, Last Rate: 6.2 mL/hr at 05/01/22 0956, 5 mcg/kg/min at 05/01/22 0956    sodium chloride (OCEAN) 0.65 % nasal squeeze bottle 2 Spray, 2 Spray, Both Nostrils, Q2H PRN, Terry, Danica B, NP    docusate sodium (COLACE) capsule 100 mg, 100 mg, Oral, DAILY, Terry, Danica B, NP, 100 mg at 05/01/22 0949    eplerenone (INSPRA) tablet 25 mg, 25 mg, Oral, DAILY, Terry, Danica B, NP, 25 mg at 05/01/22 0948    guaiFENesin (ROBITUSSIN) 100 mg/5 mL oral liquid 100 mg, 100 mg, Oral, Q4H PRN, Dee Bernstein MD, 100 mg at 04/30/22 0047    polyethylene glycol (MIRALAX) packet 17 g, 17 g, Oral, DAILY, Wandalee Reil B, NP, 17 g at 04/29/22 1000    senna-docusate (PERICOLACE) 8.6-50 mg per tablet 1 Tablet, 1 Tablet, Oral, DAILY PRN, Wandalee Reil B, NP, 1 Tablet at 04/24/22 0913    oxymetazoline (AFRIN) 0.05 % nasal spray 2 Spray, 2 Spray, Both Nostrils, BID PRN, Wandalee Reil B, NP, 2 Llewellyn at 04/24/22 1612    hydrALAZINE (APRESOLINE) tablet 50 mg, 50 mg, Oral, Q8H, Millie Wong, NP, 50 mg at 05/01/22 0507    colchicine tablet 0.6 mg, 0.6 mg, Oral, DAILY, John Wong, NP, 0.6 mg at 05/01/22 0949    alteplase (CATHFLO) 1 mg in sterile water (preservative free) 1 mL injection, 1 mg, InterCATHeter, PRN, Lorenzo Mcintyre MD, 1 mg at 04/25/22 2136    bisacodyL (DULCOLAX) suppository 10 mg, 10 mg, Rectal, DAILY PRN, Poornima Upton MD, 10 mg at 04/12/22 1533    albumin human 25% (BUMINATE) solution 12.5 g, 12.5 g, IntraVENous, BID, Polliard, Delsiromain Marshall T, NP, 12.5 g at 05/01/22 0949    famotidine (PEPCID) tablet 20 mg, 20 mg, Oral, Q12H, Poornima Upton MD, 20 mg at 05/01/22 0948    mirtazapine (REMERON) tablet 7.5 mg, 7.5 mg, Oral, QHS, Terry, Danica B, NP, 7.5 mg at 04/30/22 2138    lidocaine 4 % patch 1 Patch, 1 Patch, TransDERmal, Q24H, Terry, Danica B, NP, 1 Patch at 04/30/22 1250    Warfarin NP/MD dosing, , Other, PRN, Terry, Danica B, NP    melatonin tablet 3 mg, 3 mg, Oral, QHS PRN, Naomi Renee MD, 3 mg at 04/20/22 2127    hydrALAZINE (APRESOLINE) 20 mg/mL injection 10 mg, 10 mg, IntraVENous, Q6H PRN, Wandalee Reil B, NP, 10 mg at 04/24/22 1415    aspirin chewable tablet 81 mg, 81 mg, Oral, DAILY, Wandalee Reil B, NP, 81 mg at 05/01/22 0948    hydrALAZINE (APRESOLINE) 20 mg/mL injection 5 mg, 5 mg, IntraVENous, Q6H PRN, Wandalee Reil B, NP, 5 mg at 04/13/22 1019    sodium chloride (NS) flush 5-40 mL, 5-40 mL, IntraVENous, Q8H, Myron Wonga T, NP, 10 mL at 05/01/22 0739    sodium chloride (NS) flush 5-40 mL, 5-40 mL, IntraVENous, PRN, PolliardMyrona T, NP, 10 mL at 04/24/22 0914    0.9% sodium chloride infusion, 9 mL/hr, IntraVENous, CONTINUOUS, John Wong NP, Last Rate: 3 mL/hr at 04/20/22 0513, 3 mL/hr at 04/20/22 0513    acetaminophen (TYLENOL) tablet 650 mg, 650 mg, Oral, Q6H PRN, Caro Wong NP, 650 mg at 05/01/22 0739    naloxone (NARCAN) injection 0.4 mg, 0.4 mg, IntraVENous, PRN, Caro Wong NP    ondansetron [facility]) injection 4 mg, 4 mg, IntraVENous, Q4H PRN, John Wong NP, 4 mg at 04/11/22 2139    albuterol-ipratropium (DUO-NEB) 2.5 MG-0.5 MG/3 ML, 3 mL, Nebulization, Q6H PRN, Caro Wong NP    glucose chewable tablet 16 g, 4 Tablet, Oral, PRN, Polliard, Sacaton Finger, NP    glucagon (GLUCAGEN) injection 1 mg, 1 mg, IntraMUSCular, PRN, Polliard, Sacaton Finger, NP    insulin lispro (HUMALOG) injection, , SubCUTAneous, AC&HS, Polliard, Sacaton Finger, NP, 2 Units at 04/24/22 1310    sucralfate (CARAFATE) tablet 1 g, 1 g, Oral, AC&HS, Polliard, Sacaton Finger, NP, 1 g at 05/01/22 0739    0.45% sodium chloride infusion, 10 mL/hr, IntraVENous, CONTINUOUS, Panfilo Najera MD, Stopped at 04/10/22 0730    fentaNYL citrate (PF) injection 25 mcg, 25 mcg, IntraVENous, Q2H PRN, Polliard, Sacaton Finger, NP, 25 mcg at 04/25/22 1251    PATIENT CARE TEAM:  Patient Care Team:  Amaris Morel MD as PCP - General (Internal Medicine)  Amaris Morel MD as PCP - [facility]  Bienvenido Felix MD as Physician (Cardiology)  Merle Romano MD as Physician (Gastroenterology)  Cherelle Pérez MD as Physician (Orthopedic Surgery)  Ancelmo Ling MD as Physician (Ophthalmology)  Mendel Areola, MD (Cardiology)  Aga Amaro MD (Cardiology)     Thank you for allowing us to participate in this patient's care. Courtney Patel, LEMUEL, AGACNP-BC  [address] [place] [address], [address]  Phone: [phone]  Fax: [phone]

## 2022-05-01 NOTE — PROGRESS NOTES
2030 PICC dressing changed due to dressing not being fully secured. LVAD dressing changed, family was unable to change dressing during the day    Problem: Falls - Risk of  Goal: *Absence of Falls  Description: Document Shauna Shannon Fall Risk and appropriate interventions in the flowsheet. Outcome: Progressing Towards Goal  Note: Fall Risk Interventions:  Mobility Interventions: Assess mobility with egress test,Bed/chair exit alarm,Communicate number of staff needed for ambulation/transfer         Medication Interventions: Bed/chair exit alarm,Patient to call before getting OOB,Teach patient to arise slowly    Elimination Interventions: Bed/chair exit alarm,Call light in reach,Toileting schedule/hourly rounds              Problem: Diabetes Self-Management  Goal: *Disease process and treatment process  Description: Define diabetes and identify own type of diabetes; list 3 options for treating diabetes. Outcome: Progressing Towards Goal  Goal: *Incorporating nutritional management into lifestyle  Description: Describe effect of type, amount and timing of food on blood glucose; list 3 methods for planning meals. Outcome: Progressing Towards Goal  Goal: *Incorporating physical activity into lifestyle  Description: State effect of exercise on blood glucose levels. Outcome: Progressing Towards Goal  Goal: *Developing strategies to promote health/change behavior  Description: Define the ABC's of diabetes; identify appropriate screenings, schedule and personal plan for screenings. Outcome: Progressing Towards Goal  Goal: *Using medications safely  Description: State effect of diabetes medications on diabetes; name diabetes medication taking, action and side effects. Outcome: Progressing Towards Goal  Goal: *Monitoring blood glucose, interpreting and using results  Description: Identify recommended blood glucose targets  and personal targets.   Outcome: Progressing Towards Goal  Goal: *Prevention, detection, treatment of acute complications  Description: List symptoms of hyper- and hypoglycemia; describe how to treat low blood sugar and actions for lowering  high blood glucose level. Outcome: Progressing Towards Goal  Goal: *Prevention, detection and treatment of chronic complications  Description: Define the natural course of diabetes and describe the relationship of blood glucose levels to long term complications of diabetes. Outcome: Progressing Towards Goal  Goal: *Developing strategies to address psychosocial issues  Description: Describe feelings about living with diabetes; identify support needed and support network  Outcome: Progressing Towards Goal     Problem: Pressure Injury - Risk of  Goal: *Prevention of pressure injury  Description: Document Jl Scale and appropriate interventions in the flowsheet.   Outcome: Progressing Towards Goal  Note: Pressure Injury Interventions:  Sensory Interventions: Chair cushion    Moisture Interventions: Absorbent underpads,Minimize layers    Activity Interventions: Assess need for specialty bed,Increase time out of bed,Pressure redistribution bed/mattress(bed type)    Mobility Interventions: Pressure redistribution bed/mattress (bed type)    Nutrition Interventions: Document food/fluid/supplement intake,Offer support with meals,snacks and hydration    Friction and Shear Interventions: Trapeze to reposition                Problem: Patient Education: Go to Patient Education Activity  Goal: Patient/Family Education  Outcome: Progressing Towards Goal     Problem: Patient Education: Go to Patient Education Activity  Goal: Patient/Family Education  Outcome: Progressing Towards Goal

## 2022-05-01 NOTE — PROGRESS NOTES
0730: Bedside and Verbal shift change report given to Marilee Galindo RN (oncoming nurse) by Grey Mcduffie RN (offgoing nurse). Report included the following information SBAR, Kardex, Intake/Output, MAR, Recent Results and Cardiac Rhythm Sinus Arrhythmia. 1010: Patient walked around unit and placed in recliner at this time. 1140: Spoke with Jenny Kim NP, about patients PRN hydralazine order. Per Jenny Kim NP, only give hydralazine for a MAP greater than 90. NP also updated about patients pain. Per Jenny Kim NP she will review and see if toradol can be added back, okay to continue to give Fentanyl as needed for pain. 1430: Patient walked around unit and placed in recliner at this time. 1443: Wife at bedside. Attempted to do LVAD dressing change with wife. Wife currently declining to do dressing change. 1537: LVAD dressing change preformed at this time by RN. 1730: Patient walked around unit and placed in recliner at this time. 1930: Bedside and Verbal shift change report given to Taqueria Pruitt RN/KIRK Garcia (oncoming nurse) by Marilee Galindo RN (offgoing nurse). Report included the following information SBAR, Kardex, Intake/Output, MAR, Recent Results and Cardiac Rhythm Sinus Arrhythmia. Care Plans:   Problem: Falls - Risk of  Goal: *Absence of Falls  Description: Document Gabriel Fall Risk and appropriate interventions in the flowsheet.   Outcome: Progressing Towards Goal  Note: Fall Risk Interventions:  Mobility Interventions: Assess mobility with egress test,Bed/chair exit alarm,Communicate number of staff needed for ambulation/transfer         Medication Interventions: Bed/chair exit alarm,Patient to call before getting OOB,Teach patient to arise slowly    Elimination Interventions: Bed/chair exit alarm,Call light in reach,Toileting schedule/hourly rounds              Problem: Patient Education: Go to Patient Education Activity  Goal: Patient/Family Education  Outcome: Progressing Towards Goal     Problem: Diabetes Self-Management  Goal: *Disease process and treatment process  Description: Define diabetes and identify own type of diabetes; list 3 options for treating diabetes. Outcome: Progressing Towards Goal  Goal: *Incorporating nutritional management into lifestyle  Description: Describe effect of type, amount and timing of food on blood glucose; list 3 methods for planning meals. Outcome: Progressing Towards Goal  Goal: *Incorporating physical activity into lifestyle  Description: State effect of exercise on blood glucose levels. Outcome: Progressing Towards Goal  Goal: *Developing strategies to promote health/change behavior  Description: Define the ABC's of diabetes; identify appropriate screenings, schedule and personal plan for screenings. Outcome: Progressing Towards Goal  Goal: *Using medications safely  Description: State effect of diabetes medications on diabetes; name diabetes medication taking, action and side effects. Outcome: Progressing Towards Goal  Goal: *Monitoring blood glucose, interpreting and using results  Description: Identify recommended blood glucose targets  and personal targets. Outcome: Progressing Towards Goal  Goal: *Prevention, detection, treatment of acute complications  Description: List symptoms of hyper- and hypoglycemia; describe how to treat low blood sugar and actions for lowering  high blood glucose level. Outcome: Progressing Towards Goal  Goal: *Prevention, detection and treatment of chronic complications  Description: Define the natural course of diabetes and describe the relationship of blood glucose levels to long term complications of diabetes.   Outcome: Progressing Towards Goal  Goal: *Developing strategies to address psychosocial issues  Description: Describe feelings about living with diabetes; identify support needed and support network  Outcome: Progressing Towards Goal  Goal: *Insulin pump training  Outcome: Progressing Towards Goal  Goal: *Sick day guidelines  Outcome: Progressing Towards Goal  Goal: *Patient Specific Goal (EDIT GOAL, INSERT TEXT)  Outcome: Progressing Towards Goal     Problem: Patient Education: Go to Patient Education Activity  Goal: Patient/Family Education  Outcome: Progressing Towards Goal     Problem: Pressure Injury - Risk of  Goal: *Prevention of pressure injury  Description: Document Jl Scale and appropriate interventions in the flowsheet.   Outcome: Progressing Towards Goal  Note: Pressure Injury Interventions:  Sensory Interventions: Chair cushion    Moisture Interventions: Absorbent underpads,Minimize layers    Activity Interventions: Assess need for specialty bed,Increase time out of bed,Pressure redistribution bed/mattress(bed type)    Mobility Interventions: Pressure redistribution bed/mattress (bed type)    Nutrition Interventions: Document food/fluid/supplement intake,Offer support with meals,snacks and hydration    Friction and Shear Interventions: Trapeze to reposition                Problem: Patient Education: Go to Patient Education Activity  Goal: Patient/Family Education  Outcome: Progressing Towards Goal     Problem: Patient Education: Go to Patient Education Activity  Goal: Patient/Family Education  Outcome: Progressing Towards Goal     Problem: Patient Education: Go to Patient Education Activity  Goal: Patient/Family Education  Outcome: Progressing Towards Goal     Problem: Patient Education: Go to Patient Education Activity  Goal: Patient/Family Education  Outcome: Progressing Towards Goal     Problem: Heart Failure: Discharge Outcomes  Goal: *Demonstrates ability to perform prescribed activity without shortness of breath or discomfort  Outcome: Progressing Towards Goal  Goal: *Left ventricular function assessment completed prior to or during stay, or planned for post-discharge  Outcome: Progressing Towards Goal  Goal: *ACEI prescribed if LVEF less than 40% and no contraindications or ARB prescribed  Outcome: Progressing Towards Goal  Goal: *Verbalizes understanding and describes prescribed diet  Outcome: Progressing Towards Goal  Goal: *Verbalizes understanding/describes prescribed medications  Outcome: Progressing Towards Goal  Goal: *Describes available resources and support systems  Description: (eg: Home Health, Palliative Care, Advanced Medical Directive)  Outcome: Progressing Towards Goal  Goal: *Describes smoking cessation resources  Outcome: Progressing Towards Goal  Goal: *Understands and describes signs and symptoms to report to providers(Stroke Metric)  Outcome: Progressing Towards Goal  Goal: *Describes/verbalizes understanding of follow-up/return appt  Description: (eg: to physicians, diabetes treatment coordinator, and other resources  Outcome: Progressing Towards Goal  Goal: *Describes importance of continuing daily weights and changes to report to physician  Outcome: Progressing Towards Goal

## 2022-05-02 ENCOUNTER — APPOINTMENT (OUTPATIENT)
Dept: GENERAL RADIOLOGY | Age: 77
DRG: 001 | End: 2022-05-02
Attending: NURSE PRACTITIONER
Payer: MEDICARE

## 2022-05-02 LAB
ALBUMIN SERPL-MCNC: 2.9 G/DL (ref 3.5–5)
ALBUMIN/GLOB SERPL: 1 {RATIO} (ref 1.1–2.2)
ALP SERPL-CCNC: 149 U/L (ref 45–117)
ALT SERPL-CCNC: 20 U/L (ref 12–78)
ANION GAP SERPL CALC-SCNC: 9 MMOL/L (ref 5–15)
AST SERPL-CCNC: 59 U/L (ref 15–37)
BILIRUB SERPL-MCNC: 0.7 MG/DL (ref 0.2–1)
BNP SERPL-MCNC: 2242 PG/ML
BUN SERPL-MCNC: 18 MG/DL (ref 6–20)
BUN/CREAT SERPL: 21 (ref 12–20)
CALCIUM SERPL-MCNC: 8.2 MG/DL (ref 8.5–10.1)
CHLORIDE SERPL-SCNC: 99 MMOL/L (ref 97–108)
CO2 SERPL-SCNC: 30 MMOL/L (ref 21–32)
CREAT SERPL-MCNC: 0.86 MG/DL (ref 0.7–1.3)
ERYTHROCYTE [DISTWIDTH] IN BLOOD BY AUTOMATED COUNT: 18.5 % (ref 11.5–14.5)
GLOBULIN SER CALC-MCNC: 2.9 G/DL (ref 2–4)
GLUCOSE BLD STRIP.AUTO-MCNC: 106 MG/DL (ref 65–117)
GLUCOSE BLD STRIP.AUTO-MCNC: 128 MG/DL (ref 65–117)
GLUCOSE BLD STRIP.AUTO-MCNC: 186 MG/DL (ref 65–117)
GLUCOSE BLD STRIP.AUTO-MCNC: 210 MG/DL (ref 65–117)
GLUCOSE SERPL-MCNC: 95 MG/DL (ref 65–100)
HCT VFR BLD AUTO: 25.8 % (ref 36.6–50.3)
HGB BLD-MCNC: 7.9 G/DL (ref 12.1–17)
INR PPP: 2.3 (ref 0.9–1.1)
LDH SERPL L TO P-CCNC: 163 U/L (ref 85–241)
MAGNESIUM SERPL-MCNC: 2.3 MG/DL (ref 1.6–2.4)
MCH RBC QN AUTO: 25.6 PG (ref 26–34)
MCHC RBC AUTO-ENTMCNC: 30.6 G/DL (ref 30–36.5)
MCV RBC AUTO: 83.8 FL (ref 80–99)
NRBC # BLD: 0 K/UL (ref 0–0.01)
NRBC BLD-RTO: 0 PER 100 WBC
PLATELET # BLD AUTO: 298 K/UL (ref 150–400)
PMV BLD AUTO: 9.8 FL (ref 8.9–12.9)
POTASSIUM SERPL-SCNC: 3.6 MMOL/L (ref 3.5–5.1)
PROT SERPL-MCNC: 5.8 G/DL (ref 6.4–8.2)
PROTHROMBIN TIME: 23 SEC (ref 9–11.1)
RBC # BLD AUTO: 3.08 M/UL (ref 4.1–5.7)
SERVICE CMNT-IMP: ABNORMAL
SERVICE CMNT-IMP: NORMAL
SODIUM SERPL-SCNC: 138 MMOL/L (ref 136–145)
WBC # BLD AUTO: 5.7 K/UL (ref 4.1–11.1)

## 2022-05-02 PROCEDURE — 74011636637 HC RX REV CODE- 636/637: Performed by: NURSE PRACTITIONER

## 2022-05-02 PROCEDURE — 82962 GLUCOSE BLOOD TEST: CPT

## 2022-05-02 PROCEDURE — APPSS60 APP SPLIT SHARED TIME 46-60 MINUTES: Performed by: NURSE PRACTITIONER

## 2022-05-02 PROCEDURE — 74011250637 HC RX REV CODE- 250/637: Performed by: INTERNAL MEDICINE

## 2022-05-02 PROCEDURE — 74011250637 HC RX REV CODE- 250/637: Performed by: NURSE PRACTITIONER

## 2022-05-02 PROCEDURE — 65660000001 HC RM ICU INTERMED STEPDOWN

## 2022-05-02 PROCEDURE — 83880 ASSAY OF NATRIURETIC PEPTIDE: CPT

## 2022-05-02 PROCEDURE — 74011000250 HC RX REV CODE- 250: Performed by: NURSE PRACTITIONER

## 2022-05-02 PROCEDURE — 93750 INTERROGATION VAD IN PERSON: CPT | Performed by: NURSE PRACTITIONER

## 2022-05-02 PROCEDURE — 74011000250 HC RX REV CODE- 250: Performed by: THORACIC SURGERY (CARDIOTHORACIC VASCULAR SURGERY)

## 2022-05-02 PROCEDURE — 97116 GAIT TRAINING THERAPY: CPT

## 2022-05-02 PROCEDURE — 80053 COMPREHEN METABOLIC PANEL: CPT

## 2022-05-02 PROCEDURE — 74011250636 HC RX REV CODE- 250/636: Performed by: NURSE PRACTITIONER

## 2022-05-02 PROCEDURE — 71045 X-RAY EXAM CHEST 1 VIEW: CPT

## 2022-05-02 PROCEDURE — 85610 PROTHROMBIN TIME: CPT

## 2022-05-02 PROCEDURE — 99233 SBSQ HOSP IP/OBS HIGH 50: CPT | Performed by: INTERNAL MEDICINE

## 2022-05-02 PROCEDURE — 74011250636 HC RX REV CODE- 250/636: Performed by: THORACIC SURGERY (CARDIOTHORACIC VASCULAR SURGERY)

## 2022-05-02 PROCEDURE — 85027 COMPLETE CBC AUTOMATED: CPT

## 2022-05-02 PROCEDURE — 93750 INTERROGATION VAD IN PERSON: CPT | Performed by: INTERNAL MEDICINE

## 2022-05-02 PROCEDURE — P9047 ALBUMIN (HUMAN), 25%, 50ML: HCPCS | Performed by: NURSE PRACTITIONER

## 2022-05-02 PROCEDURE — 36415 COLL VENOUS BLD VENIPUNCTURE: CPT

## 2022-05-02 PROCEDURE — 83735 ASSAY OF MAGNESIUM: CPT

## 2022-05-02 PROCEDURE — 83615 LACTATE (LD) (LDH) ENZYME: CPT

## 2022-05-02 RX ORDER — WARFARIN SODIUM 5 MG/1
5 TABLET ORAL ONCE
Status: COMPLETED | OUTPATIENT
Start: 2022-05-02 | End: 2022-05-02

## 2022-05-02 RX ADMIN — SODIUM CHLORIDE, PRESERVATIVE FREE 10 ML: 5 INJECTION INTRAVENOUS at 17:15

## 2022-05-02 RX ADMIN — COLCHICINE 0.6 MG: 0.6 TABLET, FILM COATED ORAL at 10:46

## 2022-05-02 RX ADMIN — SACUBITRIL AND VALSARTAN 1 TABLET: 49; 51 TABLET, FILM COATED ORAL at 21:51

## 2022-05-02 RX ADMIN — SODIUM CHLORIDE, PRESERVATIVE FREE 10 ML: 5 INJECTION INTRAVENOUS at 07:07

## 2022-05-02 RX ADMIN — ASPIRIN 81 MG CHEWABLE TABLET 81 MG: 81 TABLET CHEWABLE at 10:46

## 2022-05-02 RX ADMIN — WARFARIN SODIUM 5 MG: 5 TABLET ORAL at 17:15

## 2022-05-02 RX ADMIN — SODIUM CHLORIDE, PRESERVATIVE FREE 10 ML: 5 INJECTION INTRAVENOUS at 22:00

## 2022-05-02 RX ADMIN — FENTANYL CITRATE 25 MCG: 0.05 INJECTION, SOLUTION INTRAMUSCULAR; INTRAVENOUS at 21:51

## 2022-05-02 RX ADMIN — FAMOTIDINE 20 MG: 20 TABLET ORAL at 21:52

## 2022-05-02 RX ADMIN — SUCRALFATE 1 G: 1 TABLET ORAL at 07:07

## 2022-05-02 RX ADMIN — SUCRALFATE 1 G: 1 TABLET ORAL at 21:52

## 2022-05-02 RX ADMIN — BUMETANIDE 2 MG: 1 TABLET ORAL at 17:15

## 2022-05-02 RX ADMIN — EPLERENONE 25 MG: 25 TABLET, FILM COATED ORAL at 10:47

## 2022-05-02 RX ADMIN — HYDRALAZINE HYDROCHLORIDE 50 MG: 50 TABLET, FILM COATED ORAL at 12:25

## 2022-05-02 RX ADMIN — POLYETHYLENE GLYCOL 3350 17 G: 17 POWDER, FOR SOLUTION ORAL at 10:46

## 2022-05-02 RX ADMIN — WATER 1 MG: 1 INJECTION INTRAMUSCULAR; INTRAVENOUS; SUBCUTANEOUS at 00:41

## 2022-05-02 RX ADMIN — DOCUSATE SODIUM 100 MG: 100 CAPSULE, LIQUID FILLED ORAL at 10:47

## 2022-05-02 RX ADMIN — ALBUMIN (HUMAN) 12.5 G: 0.25 INJECTION, SOLUTION INTRAVENOUS at 10:46

## 2022-05-02 RX ADMIN — SACUBITRIL AND VALSARTAN 1 TABLET: 49; 51 TABLET, FILM COATED ORAL at 10:46

## 2022-05-02 RX ADMIN — MIRTAZAPINE 7.5 MG: 15 TABLET, FILM COATED ORAL at 21:52

## 2022-05-02 RX ADMIN — SUCRALFATE 1 G: 1 TABLET ORAL at 12:26

## 2022-05-02 RX ADMIN — AMIODARONE HYDROCHLORIDE 200 MG: 200 TABLET ORAL at 10:47

## 2022-05-02 RX ADMIN — HYDRALAZINE HYDROCHLORIDE 50 MG: 50 TABLET, FILM COATED ORAL at 03:59

## 2022-05-02 RX ADMIN — HYDRALAZINE HYDROCHLORIDE 50 MG: 50 TABLET, FILM COATED ORAL at 21:51

## 2022-05-02 RX ADMIN — BUMETANIDE 2 MG: 1 TABLET ORAL at 10:47

## 2022-05-02 RX ADMIN — SUCRALFATE 1 G: 1 TABLET ORAL at 17:15

## 2022-05-02 RX ADMIN — FAMOTIDINE 20 MG: 20 TABLET ORAL at 10:47

## 2022-05-02 RX ADMIN — ALBUMIN (HUMAN) 12.5 G: 0.25 INJECTION, SOLUTION INTRAVENOUS at 17:15

## 2022-05-02 RX ADMIN — DOBUTAMINE HYDROCHLORIDE 5 MCG/KG/MIN: 400 INJECTION INTRAVENOUS at 18:48

## 2022-05-02 RX ADMIN — Medication 2 UNITS: at 12:26

## 2022-05-02 RX ADMIN — Medication 400 MG: at 10:47

## 2022-05-02 NOTE — PROGRESS NOTES
Bedside shift change report given to [facility] (oncoming nurse) by Herb Pedersen RN (offgoing nurse). Report included the following information SBAR, Kardex, ED Summary, Intake/Output, MAR and Cardiac Rhythm SA. Problem: Falls - Risk of  Goal: *Absence of Falls  Description: Document Shauna Solorzanos Fall Risk and appropriate interventions in the flowsheet. Outcome: Progressing Towards Goal  Note: Fall Risk Interventions:  Mobility Interventions: Bed/chair exit alarm,Patient to call before getting OOB,Communicate number of staff needed for ambulation/transfer    Medication Interventions: Bed/chair exit alarm,Patient to call before getting OOB,Teach patient to arise slowly    Elimination Interventions: Bed/chair exit alarm,Call light in reach,Patient to call for help with toileting needs,Toileting schedule/hourly rounds,Urinal in reach       Problem: Diabetes Self-Management  Goal: *Disease process and treatment process  Description: Define diabetes and identify own type of diabetes; list 3 options for treating diabetes. Outcome: Progressing Towards Goal  Goal: *Incorporating physical activity into lifestyle  Description: State effect of exercise on blood glucose levels. Outcome: Progressing Towards Goal  Goal: *Using medications safely  Description: State effect of diabetes medications on diabetes; name diabetes medication taking, action and side effects. Outcome: Progressing Towards Goal  Goal: *Monitoring blood glucose, interpreting and using results  Description: Identify recommended blood glucose targets  and personal targets.   Outcome: Progressing Towards Goal  Goal: *Developing strategies to address psychosocial issues  Description: Describe feelings about living with diabetes; identify support needed and support network  Outcome: Progressing Towards Goal     Problem: Pressure Injury - Risk of  Goal: *Prevention of pressure injury  Description: Document Jl Scale and appropriate interventions in the flowsheet. Outcome: Progressing Towards Goal  Note: Pressure Injury Interventions:  Sensory Interventions: Assess changes in LOC,Keep linens dry and wrinkle-free,Maintain/enhance activity level,Minimize linen layers,Avoid rigorous massage over bony prominences    Moisture Interventions: Absorbent underpads,Limit adult briefs,Minimize layers    Activity Interventions: Increase time out of bed,Pressure redistribution bed/mattress(bed type)    Mobility Interventions: Assess need for specialty bed,Turn and reposition approx.  every two hours(pillow and wedges)    Nutrition Interventions: Document food/fluid/supplement intake    Friction and Shear Interventions: Minimize layers,Apply protective barrier, creams and emollients       Problem: Heart Failure: Discharge Outcomes  Goal: *Left ventricular function assessment completed prior to or during stay, or planned for post-discharge  Outcome: Progressing Towards Goal  Goal: *Verbalizes understanding/describes prescribed medications  Outcome: Progressing Towards Goal  Goal: *Describes available resources and support systems  Description: (eg: Home Health, Palliative Care, Advanced Medical Directive)  Outcome: Progressing Towards Goal  Goal: *Describes importance of continuing daily weights and changes to report to physician  Outcome: Progressing Towards Goal     Problem: LVAD Post-op Day 15 to Discharge  Goal: Activity/Safety  Outcome: Progressing Towards Goal  Goal: Diagnostic Test/Procedures  Outcome: Progressing Towards Goal  Goal: Medications  Outcome: Progressing Towards Goal  Goal: Psychosocial  Outcome: Progressing Towards Goal     Problem: LVAD: Discharge Outcomes  Goal: *Lungs clear or at baseline  Outcome: Progressing Towards Goal  Goal: *Optimal pain control at patient's stated goal  Outcome: Progressing Towards Goal  Goal: *Demonstrates progressive activity  Outcome: Progressing Towards Goal  Goal: *Tolerating diet  Outcome: Progressing Towards Goal  Goal: *LVAD parameters within set limits  Outcome: Progressing Towards Goal  Goal: *Verbalizes and demonstrates incision care  Outcome: Progressing Towards Goal  Goal: *Demonstrates effective coping  Outcome: Progressing Towards Goal  Goal: *Patient/caregiver is able to perform drive line dressing change independently  Outcome: Progressing Towards Goal  Goal: *LVAD drive line site without signs and symptoms of wound complications  Outcome: Progressing Towards Goal

## 2022-05-02 NOTE — PROGRESS NOTES
0800:The Preceptor Review of Student Work    5/2/2022  - Shift times - 1930  to 0800    The RN documentation of patient care for Ross Day has been reviewed and approved. All medications have been administered under the direct supervision of the preceptor.     Cheo Esqueda RN

## 2022-05-02 NOTE — PROGRESS NOTES
Comprehensive Nutrition Assessment    Type and Reason for Visit: Reassess    Nutrition Recommendations/Plan:   1. Continue 4 Carb Choice diet order  2. Continue Glucerna TID       Malnutrition Assessment:  Malnutrition Status:  Severe malnutrition (04/25/22 1641)    Context:  Chronic illness     Findings of the 6 clinical characteristics of malnutrition:   Energy Intake:  No significant decrease in energy intake  Weight Loss:  Greater than 20% over 1 year     Body Fat Loss:  Severe body fat loss, Triceps   Muscle Mass Loss:  Severe muscle mass loss, Temples (temporalis),Clavicles (pectoralis &deltoids),Hand (interosseous)  Fluid Accumulation:  No significant fluid accumulation,     Strength:  Not performed              Nutrition Assessment:    Past Medical History:   Diagnosis Date    CAD (coronary artery disease) '90 '97, '13 x 2     [place] [facility] [place]    Calculus of kidney      Colonic polyps      Congestive heart failure, unspecified      Diabetes ([facility] [address]      Gastritis      Hypercholesterolemia      Sleep apnea           PMHx includes CHF due to Ischemic Cardiomyopathy, LVEF 15-20% and NYHA Class IV, CAD s/p 4v CABG, s/p AICD, HTN, DM2, and SRUTHI on CPAP who was admitted for planned implant of LVAD.  Now POD 13 s/p LVAD implant with HM3 as DT due to age (4/5/22). Post-op course complicated by RV failure requiring prolonged inotropic support with dobutamine, congestive hepatopathy, superficial soft tissue infection at sternotomy incision requiring resumption of antibiotics.     Pt screened for LOS. Noted nursing documentation supporting good PO intake. Attempted to meet with pt, but PT came by to work with him. RN confirmed excellent PO intake and consuming ONS. Weight presently up d/t fluid. Standing scale on admission ~166 lb, which indicates wt loss if previous weights accurate/ not skewed d/t fluid.   Suspect actual wt loss has taken place as appears pt was previously admitted 5/2021 for LVAD w/u, but was found to have a pulmonary nodule during w/u and found to have adenocarcinoma of the lung, so LVAD was deferred pending treatment. Pt has now completed XRT and was cleared for LVAD by heme/onc. From visual glance today, pt appeared to have significant wasting. However, RD assessment last year prior to above, indicates pt with severe wasting at that time as well. Admission wt ~90% IBW and BMI ~21.9 kg/m² supporting underweight status for age.     Happy to hear pt is eating well and consuming ONS (switched to Glucerna yesterday d/t BG and continues to drink without issue - likes all flavors). Will check back with pt when available as he is certainly at risk for malnutrition and likely meets criteria.     4/25:  Met with pt in room. NFPE reveals significant muscle wasting and loss of SQ fat. Pt reports a UBW of 232 lb ~ 1 year ago (66 lb wt loss, 28% BW). States he feel likes he eats well, but continued to lose weight. ?cardiac cachexia. Pt states that is what someone mentioned to him before and part of why he knew he had to get the surgery. Throughout this hospital stay, he feels likes he has been eating well overall and drinking 3 ONS/day. However, today he was in pain, so lunch was less than optimal.  Documentation supports sub-optimal oral intake for past week or so. Pt states they send items he won't eat (I.e blintz and sauteed veggies). Pt has yet to see a menu, so provided him with one. Encouraged him to call and place orders. Weight trending back down towards admission wt, still up with noted edema. 5/2: F/u. Met with pt in room, states he is eating what he can of the meals. Today he ate all the eggs and sausage for breakfast and all the meat for lunch, but nothing else. Obtained food preferences which I will add to his diet order. Pt does like the Glucerna shakes and states he drinks all 3/day.   Asked if he would drink more and he said yes, will increase to 4x/day for additional kcals/protein. Labs reviewed. Nutritionally Significant Medications:  Bumex, Colace, Pepcid, Humalog, Mag-Ox, Remeron    Estimated Daily Nutrient Needs:  Energy Requirements Based On: Kcal/kg  Weight Used for Energy Requirements: Admission (75.3 kg)  Energy (kcal/day): 7760-7831 (30-35 kcal/kg)     Protein (g/day): 115-135 (1.5-1.8 gm/kg)     Fluid (ml/day): 1 mL/kcal    Nutrition Related Findings:   Edema: none  Last BM: 04/30/22, Loose        Wounds: Surgical incision,Skin tears      Current Nutrition Therapies:  ADULT DIET Regular; 4 carb choices (60 gm/meal); No Concentrated Sweets  ADULT ORAL NUTRITION SUPPLEMENT Breakfast, Lunch, Dinner; Diabetic Supplement  Meal Intake:   Patient Vitals for the past 168 hrs:   % Diet Eaten   05/01/22 1537 76 - 100%   05/01/22 1125 76 - 100%   05/01/22 0858 76 - 100%   04/30/22 1122 26 - 50%   04/30/22 0900 26 - 50%   04/30/22 0445 0%   04/30/22 0050 0%   04/29/22 2000 0%   04/29/22 1818 76 - 100%   04/29/22 1344 26 - 50%   04/29/22 1000 76 - 100%   04/28/22 1300 76 - 100%   04/28/22 1038 76 - 100%   04/25/22 1715 26 - 50%     Supplement Intake:  Patient Vitals for the past 168 hrs:   Supplement intake %   05/01/22 1537 0%   05/01/22 1125 76 - 100%   05/01/22 0858 76 - 100%   04/30/22 0445 0%   04/30/22 0050 0%   04/29/22 2000 0%         Anthropometric Measures:  Height: 6' 1\" (185.4 cm)  Ideal Body Weight (IBW): 184 lbs (84 kg)  Admission Body Weight: 166 lb 0.1 oz (75.3 kg)  Current Body Wt:  80.2 kg (176 lb 12.9 oz), 96.1 % IBW. Standing scale  Current BMI (kg/m2): 23.3        Weight Adjustment: No adjustment                 BMI Category: Normal weight (BMI 18.5-24. 9)    Wt Readings from Last 10 Encounters:   05/02/22 80.2 kg (176 lb 12.9 oz)   03/31/22 75.3 kg (166 lb)   03/08/22 75.4 kg (166 lb 3.2 oz)   03/04/22 75.4 kg (166 lb 3.6 oz)   02/24/22 76.5 kg (168 lb 9.6 oz)   02/14/22 76.7 kg (169 lb)   02/11/22 78.8 kg (173 lb 12.8 oz)   02/05/22 80.9 kg (178 lb 5.6 oz)   01/31/22 88 kg (194 lb)   01/24/22 88.7 kg (195 lb 9.6 oz)           Nutrition Diagnosis:   · Increased nutrient needs related to cardiac dysfunction,catabolic illness as evidenced by BMI,weight loss (CHF, recent LVAD)      Nutrition Interventions:   Food and/or Nutrient Delivery: Continue current diet,Continue oral nutrition supplement  Nutrition Education/Counseling: No recommendations at this time  Coordination of Nutrition Care: Continue to monitor while inpatient       Goals:  Previous Goal Met: Progressing toward goal(s)          Nutrition Monitoring and Evaluation:   Behavioral-Environmental Outcomes: None identified  Food/Nutrient Intake Outcomes: Food and nutrient intake,Supplement intake  Physical Signs/Symptoms Outcomes: Biochemical data,Meal time behavior,Fluid status or edema,Hemodynamic status,Nutrition focused physical findings,Weight    Discharge Planning:    Continue current diet,Continue oral nutrition supplement    Cal García RD  Available via [address] [place] [address]

## 2022-05-02 NOTE — PROGRESS NOTES
Problem: Mobility Impaired (Adult and Pediatric)  Goal: *Acute Goals and Plan of Care (Insert Text)  Description: FUNCTIONAL STATUS PRIOR TO ADMISSION: Patient was independent and active without use of DME.    HOME SUPPORT PRIOR TO ADMISSION: The patient lived with his wife but did not require assist.    Physical Therapy Goals- Continue goals with exception of #7 for next 7 days 4/22/2022, continue all goals 4/29/2022    Weekly Reassessment 4/15/2022 (goals progressed)  1. Patient will move from supine to sit and sit to supine , scoot up and down, and roll side to side in bed with head of bed flat with supervision/set-up within 7 days. 2.  Patient will perform sit to/from stand from chair height with supervision/set-up within 7 days. 3.  Patient will ambulate 300 feet stand by assist within 7 days. 4.  Patient will ascend/descend 6 stairs with handrail(s) with minimal assistance/contact guard assist within 14 days. 5.  Patient will perform cardiac exercises per protocol with supervision/set-up within 7 days. 6.  Patient will verbally and functionally recall mindful movement precautions within 7 days. 7.  Patient will perform power exchange for power module to/from battery with stand by assist within 7 days. -MET currently independent 4/22/2022    Initiated 4/8/2022  1. Patient will move from supine to sit and sit to supine , scoot up and down, and roll side to side in bed with supervision/set-up within 7 days. 2.  Patient will perform sit to/from stand with supervision/set-up within 7 days. 3.  Patient will ambulate 50 feet with least restrictive assistive device and minimal assistance/contact guard assist within 7 days. 4.  Patient will ascend/descend 6 stairs with handrail(s) with minimal assistance/contact guard assist within 14 days. 5.  Patient will perform cardiac exercises per protocol with supervision/set-up within 7 days.   6.  Patient will verbally and functionally recall 3/3 sternal precautions within 7 days. 7.  Patient will perform power exchange for power module to/from battery with minimal assistance within 7 days. Outcome: Progressing Towards Goal   PHYSICAL THERAPY TREATMENT  Patient: Selina Truong (88 y.o. male)  Date: 5/2/2022  Diagnosis: HFrEF (heart [facility] with reduced ejection fraction) [facility] [I50.20] <principal problem not specified>  Procedure(s) (LRB):  REDO STERNOTOMY; RIGHT GROIN CUTDOWN FOR CANNULATION;  INSERTION OF LEFT VENTRICULAR ASSIST DEVICE (HMIII); AORTIC VALVE CLOSURE CHEL BY DR. James Armstrong. (N/A) 27 Days Post-Op  Precautions: Fall (mindful movement, LVAD)  Chart, physical therapy assessment, plan of care and goals were reviewed. ASSESSMENT  Patient continues with skilled PT services and is progressing towards goals. Patient received seated in chair, agreeable to therapy. Participated in 6MWT as below. Less SOB this session with ambulation and with increased milton compared to Friday. No LOB and able to manage his own IV pole. Independent with power module to battery switch over. Recommend HHPT. Current Level of Function Impacting Discharge (mobility/balance): SBA    Other factors to consider for discharge: strong LVAD management skills         PLAN :  Patient continues to benefit from skilled intervention to address the above impairments. Continue treatment per established plan of care. to address goals. Recommendation for discharge: (in order for the patient to meet his/her long term goals)  Physical therapy at least 2 days/week in the home     This discharge recommendation:  Has been made in collaboration with the attending provider and/or case management    IF patient discharges home will need the following DME: none       SUBJECTIVE:   Patient stated I walked about three times yesterday.     OBJECTIVE DATA SUMMARY:   Critical Behavior:  Neurologic State: Alert  Orientation Level: Oriented X4  Cognition: Appropriate decision making,Appropriate for age attention/concentration,Appropriate safety awareness,Follows commands  Functional Mobility Training:  Transfers:  Sit to Stand: Stand-by assistance  Stand to Sit: Stand-by assistance  Balance:  Sitting: Intact  Standing: Intact; With support (IV pole)  Ambulation/Gait Training:  Distance (ft): 750 Feet (ft)  Ambulation - Level of Assistance: Stand-by assistance  Gait Abnormalities: Decreased step clearance  Speed/Charlene: Pace decreased (<100 feet/min)  Step Length: Right shortened;Left shortened    6 MWT results: (Specify if any supplemental oxygen is used, the type, pre, during and post sats.)  Distance Walked in Feet (ft): 595 ft.  181 Meters    Pre Heart Rate: 82   Pre O2 Saturation: 98      Post Heart Rate: 88   Post O2 Saturation: 98   Assistive device used:         Normative data:   Men 39-80 years old = 1889 feet; Women 3680 years gwe=5083 feet  Modified 10 point Thom RPE scale utilized:  0 = no breathlessness at all ---> 10 = maximum exertion  Please refer to the flowsheet for any additional vital signs taken during this treatment. Pain Rating:  Denied pain    Activity Tolerance:   Good, SpO2 stable on RA, requires rest breaks, and observed SOB with activity    After treatment patient left in no apparent distress:   Sitting in chair, Call bell within reach, and Bed / chair alarm activated    COMMUNICATION/COLLABORATION:   The patients plan of care was discussed with: Occupational therapist, Registered nurse, and NP .      Hardik Gayle PT, DPT   Time Calculation: 25 mins

## 2022-05-02 NOTE — PROGRESS NOTES
[facility] in [place], [place]  Inpatient Progress Note      Patient name: Shira Leone  Patient : 1945  Patient MRN: [id number]  Consulting MD: Doug Redd MD  Date of service: 22      REASON FOR REFERRAL:  Management of chronic systolic heart failure, LVAD     PLAN OF CARE:  · 69 y.o. male with severe ischemic cardiomyopathy, LVEF 15-20% s/p HM3 LVAD implant as DT on 22 by Dr. Adry Ashton c/b intraoperative bleeding requiring multiple blood products and subsequent RV failure with severe TR on high dose dobutamine IV  · Pt reports that he \"does not want to go home attached to anything other than the LVAD\"; is not interested in [address] catheter or IV dobutamine at home- will work towards weaning dobut if able, and discuss plans for CT removal and monitoring of effusions with CT surgery  · Plan for remainder of hospitalization includes:  ? Surveillance weekly echos to determine if RV function improves and TV coapts  ? Will likely need Aspira cath prior to D/C- TBD after discussion with CT surgery  ? PT/OT/Nutrition  ?  Optimization of GDMT       RECOMMENDATIONS:  POD 25  TTE  showed severe TR, severe RV dysfunction; continue to optimize medically   Continue speed 4800rpms, low speed 4400  Trial weaning dobutamine, decreased to 5 mcg/kg/min over the weekend pt w/o complaints or worsened symptoms  Pt will likely need to D/C home on dobutamine as bridge to RV recovery  Continue hydralazine 50 mg PO q8h   Continue amiodarone 200 mg PO daily x 2 weeks   Continue Entresto 49/51mg BID  Consider addition of digoxin for RV support   Cont Eplerenone 25mg daily  Continue Bumex 2 mg PO BID    Completed course of cefepime for sternal incision  Driveline dressing changes daily for now until 5/3/22  Needs EKG weekly   Continue warfarin, goal INR 2-3; 5 mg tonight   ASA 81mg daily  Continue colchicine for pericarditis   D/C'd gabapentin- pt reports tremors with this and does not want to take it. IV fentanyl prn (pt with hx anaphylaxis to oxycodone)  PRN toradol  Continue bowel regimen   Remove CT today, evaluation for re accumulation    CPAP qHS  Advanced care plan forms on file   Strict I/O, daily weights, Na+ restricted diet   Continue VAD education   Equipment ordered  6 Min walk today if > 300M will continue dobutamine wean   Tentative D/C date next week       INTERVAL HISTORY:  Decrease in CT output, only 200mL last 24 hrs  Pt reports feeling well today  Net -900mL  VSS, Cr and Pro BNP stable  T Bili 0.7  Dobutamine decreased to 5mcg/kg/min    IMPRESSION:  POD 25 S/p LVAD implant as DT  · 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  · Persistent large volume chest tube output  Chronic systolic heart failure  · Stage D, NYHA class IV symptoms  · Non-ischemic cardiomyopathy, LVEF 20% with LVEDD 6.2  · RV dysfunction, TAPSE 1.22 (prelimnary read)  · TBili 1.5 likely from cardiac congestion  At risk of sudden cardiac death  · Recent cardiac arrest   · S/p ICD (1/2013, [employer] followed by [facility]); New implant on 10/21/2021 [employer] Vigilant  Coronary artery disease  · S/p multiple interventions  · S/p 4V CABG (8/2012)  · LHC (7/2019) severe stenosis of LIMA to LAD anastomosis site, SVG graft to OM is occluded, SVG graft to RCA 40-50%, LAD occluded proximally, severe proximal LCx, RCA is the long . · PET (6/2019) EF 24% with anterior lateral and inferior reversible defect  Cardiac risk factors  · HTN  · HL  · DM2  · SRUTHI on CPAP  · MIld carotid stenosis  Anemia, microcytic  · Iron deficiency  DVT with filter  Pulmonary nodules   Dysphagia       LIFE GOALS:  Patient's personal goals include: getting stronger and going home soon  Important upcoming milestones or family events:    The patient identifies the following as important for living well: being independent, being at home, enjoying family time      [facility]:  Echo 4/26/22 - LVIDd 4.9 cm, TAPSE 0.5 cm,   Echo 4/18/22- LVEF 15-20%, severe TR, TAPSE 0.5cm, RVIDd 6cm   Echo (4/8/22)    Left Ventricle: Left ventricle size is normal. Moderately increased wall thickness. Findings consistent with concentric remodeling. Severe global hypokinesis present. Severely reduced left ventricular systolic function with a visually estimated EF of 10 -15%. 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   Aortic Valve: Moderate sclerosis of the aortic valve cusp.   Mitral Valve: Moderately thickened leaflet. Mild transvalvular regurgitation.   Tricuspid Valve: Severe transvalvular regurgitation.   Right Atrium: Right atrium is severely dilated.     Echo (3/2/22)   · LV 10-15%  · Mod-severe MR      Echo (11/23/21):  · LV 15-20%.    · Mod-severe, MR, mod-TR     Echo (5/20/21)  · LV: Estimated LVEF is 20 - 25%. Normal wall thickness. Mildly dilated left ventricle. Severely and globally reduced systolic function. Severe (grade 4) left ventricular diastolic dysfunction. · LA: Severely dilated left atrium. · RA: Severely dilated right atrium. · MV: Moderate mitral valve regurgitation is present. · TV: Moderate tricuspid valve regurgitation is present. · AO: Mild aortic root dilatation. · RV: Pacer/ICD present. · LVED 6.2cm     EKG (5/20/21) SB with 1degree AV block, QRS 116ms     LHC (5/24/21) Native Coronaries: LM - moderate to severe distal disease 50%. % prox occluded (), heavily calcified, LCx: 80% proximal stenosis. OM1 is  (seen filling faintly via collaterals). OM2 99% stenosis. RCA: 100% proximal occluded.  LIMA to LAD: patent, supplies only a diagonal branch (probably D2). The LAD distal to the bifurcation is 100% occluded () and fills via right to left collaterals off the SVG to RCA. SVG to R-PDA: patent with moderate diffuse irregularities but no obstructive disease in the graft.    No appealing interventional targets. BRIEF HISTORY OF PRESENT ILLNESS:  Jacki Love is a 69 y.o. male with h/o HTN, HL, DM2, SRUTHI on CPAP, chronic systolic heart failure, stage D, NYHA class IV symptoms, non-ischemic cardiomyopathy, LVEF 20% with LVEDD 6.2, RV dysfunciton, TAPSE 1.22, TBili 1.5 likely from cardiac congestion, recent cardiac arrest s/p ICD (1/2013, [employer] followed by [facility]), coronary artery disease s/p multiple interventions s/p 4V CABG (8/2012), LHC (7/2019) severe stenosis of LIMA to LAD anastomosis site, SVG graft to OM is occluded, SVG graft to RCA 40-50%, LAD occluded proximally, severe proximal LCx, RCA is the long . PET (6/2019) EF 24% with anterior lateral and inferior reversible defect. Anemia, microcytic with iron deficiency, DVT with filter.     Patient was referred to [facility] by Dr. Anthony Candelaria in 2021 for evaluation of his candidacy for advanced therapies.     Patient agreed to inpatient initiation of palliative infusion of chronic inotropes and evaluation for LVAD-DT. Patient was admitted 5/2-5/25/21. Patient was started on milrinone infusion and had first part of LVAD evaluation completed. Right and left heart cath on 5/24/21 showed severe diffuse native vessel CAD, with patent grafts (LIMA to D2, SVG to RCA).  Pt was found to have pulmonary nodule during workup. Antiplatelet therapy was held during hospitalization and after discharge due to anticipated pulmonary nodule biopsy, bone marrow biopsy and EGD/C-Scope as part of LVAD workup. Pt found to have adenocarcinoma of the lung, and so LVAD was deferred pending treatment. Patient completed radiation treatment for adenocarcinoma of the lung. Hem-onc cleared patient for VAD implant with 90% 2 year prognosis.      He was most recently admitted for elective pre-op EGD and colonoscopy which he tolerated well; path negative for malignancy.      He presented to [facility] on 4/3 for admission for planned LVAD implant. Underwent LVAD implant on 4/5/22.   Was re-do sternotomy, bleeding intra-op, required cryo, k-centra, FFP, Plt, and cellsaver in OR. Had RV dysfunction noted intra-op; requiring lower VAD speed and dual inotrope support. Inotropic support was able to be decreased, but pt now remains on dobutamine at 6mcg/kg/min.      Pt ans wife have been doing well with LVAD education. Pt has been OOB, is eating well and moving his bowels. Therapeutic on warfarin. REVIEW OF SYSTEMS:  Review of Systems   Constitutional: Negative for chills, fever, malaise/fatigue and weight loss. Respiratory: Negative for cough and shortness of breath. Cardiovascular: Negative for chest pain, palpitations, orthopnea and leg swelling. Gastrointestinal: Negative for abdominal pain, constipation, diarrhea, heartburn, nausea and vomiting. Neurological: Positive for tremors. Negative for dizziness and weakness. PHYSICAL EXAM:  Visit Vitals  /88   Pulse 80   Temp 99 °F (37.2 °C)   Resp 20   Ht 6' 1\" (1.854 m)   Wt 176 lb 12.9 oz (80.2 kg)   SpO2 96%   BMI 23.33 kg/m²       LVAD INTERROGATION:  Device interrogated in person  Device function normal, normal flow, no events  LVAD   Pump Speed (RPM): 4800  Pump Flow (LPM): 3.8  MAP: 90  PI (Pulsitility Index): 4.4  Power: 3.2   Test: No  Back Up  at Bedside & Labeled: Yes  Power Module Test: No  Driveline Site Care: No  Driveline Dressing: Clean, Dry, and Intact  MAP in Therapeutic Range (Outpatient): Yes  Testing  Alarms Reviewed: Yes  Back up SC speed: 4800  Back up Low Speed Limit: 4400  Emergency Equipment with Patient?: Yes  Emergency procedures reviewed?: Yes  Drive line site inspected?: No  Drive line intergrity inspected?: Yes  Drive line dressing changed?: No      Physical Exam  Vitals and nursing note reviewed. Constitutional:       General: He is not in acute distress. Appearance: Normal appearance.    Cardiovascular:      Rate and Rhythm: Normal rate and regular rhythm. Comments: LVAD Hum noted on auscultation  Pulmonary:      Effort: Pulmonary effort is normal. No respiratory distress. Abdominal:      General: Bowel sounds are normal. There is no distension. Palpations: Abdomen is soft. Tenderness: There is no abdominal tenderness. Musculoskeletal:      Right lower leg: No edema. Left lower leg: No edema. Skin:     General: Skin is warm and dry. Capillary Refill: Capillary refill takes less than 2 seconds. Neurological:      General: No focal deficit present. Mental Status: He is alert and oriented to person, place, and time. Psychiatric:         Mood and Affect: Mood normal.         Behavior: Behavior normal.         Thought Content: Thought content normal.         Judgment: Judgment normal.              PAST MEDICAL HISTORY:  Past Medical History:   Diagnosis Date    CAD (coronary artery disease) '90 '97, '13 x 2    MI, code ice in 2013 [facility]    Calculus of kidney     Colonic polyps     Congestive heart failure, unspecified     Diabetes ([facility] [address]     Gastritis     Hypercholesterolemia     Sleep apnea        PAST SURGICAL HISTORY:  Past Surgical History:   Procedure Laterality Date    COLONOSCOPY  04/06/2011    16, due 21    COLONOSCOPY N/A 3/2/2022    COLONOSCOPY    :- performed by Marlys Oreilly [facility] at [facility] ENDOSCOPY    ENDOSCOPY, COLON, DIAGNOSTIC      11, due 16    HX CORONARY ARTERY BYPASS GRAFT  8/22/12    x 4 vessel by S.  James    HX HEENT      LASIK    HX PACEMAKER PLACEMENT  1/30/13    [place] CARDIAC SURG PROCEDURE UNLIST  2012    x 4 vessels       FAMILY HISTORY:  Family History   Problem Relation Age of Onset    Heart Disease Mother         MI    Heart Disease Father         CAD & PVD    Lung Disease Father     Cancer Father         lung    Diabetes Maternal Grandmother     Heart Disease Other         CAD    Stroke Sister        SOCIAL HISTORY:  Social History     Socioeconomic History    Marital status:    Tobacco Use    Smoking status: Never Smoker    Smokeless tobacco: Never Used   Vaping Use    Vaping Use: Never used   Substance and Sexual Activity    Alcohol use: Yes     Alcohol/week: 0.0 standard drinks     Comment:  VERY RARE    Drug use: No       LABORATORY RESULTS:     Labs Latest Ref Rng & Units 5/2/2022 5/1/2022 4/30/2022 4/29/2022 4/28/2022 4/27/2022 4/26/2022   WBC 4.1 - 11.1 K/uL 5.7 5.5 5.6 5.9 4.8 5.0 4.7   RBC 4.10 - 5.70 M/uL 3.08(L) 2.92(L) 2.95(L) 3.23(L) 2.91(L) 2.99(L) 2.96(L)   Hemoglobin 12.1 - 17.0 g/dL 7. 9(L) 7. 6(L) 7. 6(L) 8.4(L) 7. 5(L) 7. 7(L) 7. 8(L)   Hematocrit 36.6 - 50.3 % 25. 8(L) 24. 7(L) 24. 6(L) 26. 6(L) 24. 2(L) 25. 0(L) 24. 8(L)   MCV 80.0 - 99.0 FL 83.8 84.6 83.4 82.4 83.2 83.6 83.8   Platelets 470 - 199 K/uL 298 274 275 299 261 257 259   Lymphocytes 12 - 49 % - - - - - - -   Monocytes 5 - 13 % - - - - - - -   Eosinophils 0 - 7 % - - - - - - -   Basophils 0 - 1 % - - - - - - -   Bands 0 - 6 % - - - - - - -   Albumin 3.5 - 5.0 g/dL 2. 9(L) 2. 6(L) 2. 8(L) 3.0(L) 2. 8(L) 2. 9(L) 2. 7(L)   Calcium 8.5 - 10.1 MG/DL 8. 2(L) 8.0(L) 7. 7(L) 7. 9(L) 8.2(L) 8.6 8.2(L)   Glucose 65 - 100 mg/dL 95 199(H) 125(H) 117(H) 93 92 109(H)   BUN 6 - 20 MG/DL 18 17 24(H) 23(H) 26(H) 26(H) 23(H)   Creatinine 0.70 - 1.30 MG/DL 0.86 0.86 1.01 1.00 1.08 0.96 0.94   Sodium 136 - 145 mmol/L 138 133(L) 137 136 134(L) 135(L) 133(L)   Potassium 3.5 - 5.1 mmol/L 3.6 3.7 4.0 3.9 4.0 3.9 4.0   TSH 0.36 - 3.74 uIU/mL - - - - - - -   LDH 85 - 241 U/L 163 177 158 176 188 169 190   Some recent data might be hidden     Lab Results   Component Value Date/Time    TSH 2.26 04/10/2022 03:00 AM    TSH 1.68 03/01/2022 11:50 AM    TSH 1.47 05/26/2021 10:44 PM    TSH 1.97 05/20/2021 04:28 PM    TSH 1.41 02/09/2021 03:05 PM    TSH 1.740 08/14/2018 09:58 AM    TSH 1.710 10/18/2017 12:00 AM       ALLERGY:  Allergies   Allergen Reactions    Oxycodone Anaphylaxis    Pcn [Penicillins] Hives        CURRENT MEDICATIONS:    Current Facility-Administered Medications:     sacubitriL-valsartan (ENTRESTO) 49-51 mg tablet 1 Tablet, 1 Tablet, Oral, Q12H, Melanie JACOBSON NP, 1 Tablet at 05/02/22 1046    magnesium oxide (MAG-OX) tablet 400 mg, 400 mg, Oral, DAILY, Polliard, Burnis Ply T, NP, 400 mg at 05/02/22 1047    bumetanide (BUMEX) tablet 2 mg, 2 mg, Oral, BID, PolliardRaquel, NP, 2 mg at 05/02/22 1047    amiodarone (CORDARONE) tablet 200 mg, 200 mg, Oral, DAILY, Polliard, Burnis Ply T, NP, 200 mg at 05/02/22 1047    DOBUTamine (DOBUTREX) 1,000 mg/250 mL (4,000 mcg/mL) infusion, 5 mcg/kg/min, IntraVENous, CONTINUOUS, Melanie JACOBSON NP, Last Rate: 6.2 mL/hr at 05/02/22 0821, 5 mcg/kg/min at 05/02/22 0821    sodium chloride (OCEAN) 0.65 % nasal squeeze bottle 2 Spray, 2 Spray, Both Nostrils, Q2H PRN, Zeus Stewartin KAVON, NP    docusate sodium (COLACE) capsule 100 mg, 100 mg, Oral, DAILY, Terry, Danica B, NP, 100 mg at 05/02/22 1047    eplerenone (INSPRA) tablet 25 mg, 25 mg, Oral, DAILY, Zeus Stewartin B, NP, 25 mg at 05/02/22 1047    guaiFENesin (ROBITUSSIN) 100 mg/5 mL oral liquid 100 mg, 100 mg, Oral, Q4H PRN, Evaristo Newsome MD, 100 mg at 04/30/22 0047    polyethylene glycol (MIRALAX) packet 17 g, 17 g, Oral, DAILY, Melanie JACOBSON NP, 17 g at 05/02/22 1046    senna-docusate (PERICOLACE) 8.6-50 mg per tablet 1 Tablet, 1 Tablet, Oral, DAILY PRN, Melanie JACOBSON NP, 1 Tablet at 04/24/22 0913    oxymetazoline (AFRIN) 0.05 % nasal spray 2 Spray, 2 Spray, Both Nostrils, BID PRN, Melanie JACOBSON, NP, 2 [place] at 04/24/22 1612    hydrALAZINE (APRESOLINE) tablet 50 mg, 50 mg, Oral, Q8H, Millie Wong NP, 50 mg at 05/02/22 0359    colchicine tablet 0.6 mg, 0.6 mg, Oral, DAILY, Poonam Wong, NP, 0.6 mg at 05/02/22 1046    alteplase (CATHFLO) 1 mg in sterile water (preservative free) 1 mL injection, 1 mg, InterCATHeter, PRN, John Chiu MD, 1 mg at 05/02/22 0041    bisacodyL (DULCOLAX) suppository 10 mg, 10 mg, Rectal, DAILY PRN, Aubrey Horn MD, 10 mg at 04/12/22 1533    albumin human 25% (BUMINATE) solution 12.5 g, 12.5 g, IntraVENous, BID, Polliard, Soheila Singh T, NP, 12.5 g at 05/02/22 1046    famotidine (PEPCID) tablet 20 mg, 20 mg, Oral, Q12H, Aubrey Horn MD, 20 mg at 05/02/22 1047    mirtazapine (REMERON) tablet 7.5 mg, 7.5 mg, Oral, QHS, Terry, Danica B, NP, 7.5 mg at 05/01/22 2231    lidocaine 4 % patch 1 Patch, 1 Patch, TransDERmal, Q24H, Terry, Danica B, NP, 1 Patch at 04/30/22 1250    Warfarin NP/MD dosing, , Other, PRN, Terry, Danica B, NP    melatonin tablet 3 mg, 3 mg, Oral, QHS PRN, Tamanna Kellogg MD, 3 mg at 04/20/22 2127    hydrALAZINE (APRESOLINE) 20 mg/mL injection 10 mg, 10 mg, IntraVENous, Q6H PRN, Edwyna [place] B, NP, 10 mg at 04/24/22 1415    aspirin chewable tablet 81 mg, 81 mg, Oral, DAILY, Edwyna [place] B, NP, 81 mg at 05/02/22 1046    hydrALAZINE (APRESOLINE) 20 mg/mL injection 5 mg, 5 mg, IntraVENous, Q6H PRN, Edwyna [place] B, NP, 5 mg at 04/13/22 9204    sodium chloride (NS) flush 5-40 mL, 5-40 mL, IntraVENous, Q8H, Polljanny Millie T, NP, 10 mL at 05/02/22 0707    sodium chloride (NS) flush 5-40 mL, 5-40 mL, IntraVENous, PRN, Polljanny, Soheila Ireneese T, NP, 10 mL at 04/24/22 0914    0.9% sodium chloride infusion, 9 mL/hr, IntraVENous, CONTINUOUS, Soheila Wong T, NP, Last Rate: 3 mL/hr at 04/20/22 0513, 3 mL/hr at 04/20/22 0513    acetaminophen (TYLENOL) tablet 650 mg, 650 mg, Oral, Q6H PRN, PolliardEmelia, NP, 650 mg at 05/01/22 0739    naloxone (NARCAN) injection 0.4 mg, 0.4 mg, IntraVENous, PRN, Polliard, Emelia Marisa, NP    [facility]ansetron [facility]) injection 4 mg, 4 mg, IntraVENous, Q4H PRN, PolliarSoheila reynoso NP, 4 mg at 04/11/22 2139    albuterol-ipratropium (DUO-NEB) 2.5 MG-0.5 MG/3 ML, 3 mL, Nebulization, Q6H PRN, Tahmina Wonge Marisa, NP    glucose chewable tablet 16 g, 4 Tablet, Oral, PRN, PolliardTahminae Marisa, NP    glucagon (GLUCAGEN) injection 1 mg, 1 mg, IntraMUSCular, PRN, Kelsey Wong NP    insulin lispro (HUMALOG) injection, , SubCUTAneous, AC&HS, Kelsey Wong NP, 2 Units at 05/01/22 1314    sucralfate (CARAFATE) tablet 1 g, 1 g, Oral, AC&HS, Marvin, Kelsey Tejeda, NP, 1 g at 05/02/22 0707    0.45% sodium chloride infusion, 10 mL/hr, IntraVENous, CONTINUOUS, Dion Najera MD, Stopped at 04/10/22 0730    fentaNYL citrate (PF) injection 25 mcg, 25 mcg, IntraVENous, Q2H PRN, Kelsey Wong NP, 25 mcg at 05/01/22 2220    PATIENT CARE TEAM:  Patient Care Team:  David Sawant MD as PCP - General (Internal Medicine)  David Sawant MD as PCP - [facility]  Wesley Cardozo MD as Physician (Cardiology)  Jesse Mcmullen MD as Physician (Gastroenterology)  Loreta Presley MD as Physician (Orthopedic Surgery)  Red Aguayo MD as Physician (Ophthalmology)  Saralyn Angelucci, MD (Cardiology)  Yulia Salcedo MD (Cardiology)     Thank you for allowing us to participate in this patient's care. Jennifer Leavitt NP  [facility] [address], [address]  Phone: [phone]    [facility] ATTENDING ADDENDUM    Patient was seen and examined in person. Data and notes were reviewed. I have discussed and agree with the plan as noted in the NP note above without further additions.     Jefry Bae MD PhD  Nicolas Whitaker [phone]

## 2022-05-02 NOTE — PROGRESS NOTES
Verbal bedside report given to KIRK Sotelo and Norvel Boxer, RN oncoming nurse by Daniel Wallis. Eli Melendez, RN off-going nurse. Report included current pt status and condition, recent results, hx of present illness, heart rate and rhythm, and respiratory status. 200  Pt got up from chair without calling, chair alarm deployed. Told pt to call in future before getting up to avoid falls, pt states: \"I am not going to fall\" will not commit to using call bell before getting up in future. Informed pt of risks and ensured chair alarm is on again. 1330  PA removed drains, still having significant output. Held pressure until drainage became very slow seep, placed occlusive dressing, will continue to monitor.     1230  Pt up in chair watching tv, VSS.    1200  Up walking with PT.

## 2022-05-02 NOTE — PROGRESS NOTES
Occupational Therapy  5/2/2022    Chart reviewed in prep for OT weekly reassessment. Pt with removal of chest tubes this PM with copious drainage from chest tube sites. RN present at bedside when OT attempted to see pt. Will follow back tomorrow.  Juan Antonio Mercado, OT

## 2022-05-02 NOTE — PROCEDURES
CTs x 5 removed. After removal, dressing immediately saturated with serous drainage. Removed. Copious drainage from chest tube sites. Collected with gauze and pressure held on the site with improvement but still actively draining. Turned over to the HF team and RN as I was called urgently to the cath lab. Recommend regular dressing changes or possible osteomy collection system if needed. Prineo dressing removed. Incision CDI.

## 2022-05-02 NOTE — PROGRESS NOTES
Physical Therapy  5/2/2022    6 MWT results: (Specify if any supplemental oxygen is used, the type, pre, during and post sats.)  Distance Walked in Feet (ft): 595 ft.   181.68 Meters    Pre Heart Rate: 82   Pre O2 Saturation: 98      Post Heart Rate: 88   Post O2 Saturation: 98   Assistive device used:  None, room air       Normative data:   Men 39-80 years old = 1889 feet; Women 3680 years ryl=7921 feet  Modified 10 point Thom RPE scale utilized:  0 = no breathlessness at all ---> 10 = maximum exertion  Please refer to the flowsheet for any additional vital signs taken during this treatment. Full note to follow. Thank you.     Camila Whatley, PT, DPT

## 2022-05-02 NOTE — PROGRESS NOTES
0730: Bedside and Verbal shift change report given to [address] [place] [address] and BelindaRN (oncoming nurse) by Renae Saucedo and KIRK Garcia (offgoing nurse). Report included the following information SBAR, Kardex, Intake/Output, MAR, Recent Results and Cardiac Rhythm Sinus arrythmia. 56: YOLI Woods at bedside for chest tube removal. RN called into room by PA- large amount of drainage from CT site, RN to take over d/t PA urgently called to cath lab.     1430: drainage slowed- CT site dressed with vaseline gauze, gauze, ABD pads and tegaderm. Will continue to monitor drainage    Preceptor Review of RN Work    5/2/2022  - Shift times - 0730 to 1930    The RN documentation of patient care for [address] [place] [address] by [facility] has been reviewed and approved. All medications have been administered under the direct supervision of the preceptor.     Morales Carter

## 2022-05-03 ENCOUNTER — APPOINTMENT (OUTPATIENT)
Dept: GENERAL RADIOLOGY | Age: 77
DRG: 001 | End: 2022-05-03
Attending: NURSE PRACTITIONER
Payer: MEDICARE

## 2022-05-03 LAB
ALBUMIN SERPL-MCNC: 2.8 G/DL (ref 3.5–5)
ALBUMIN/GLOB SERPL: 0.8 {RATIO} (ref 1.1–2.2)
ALP SERPL-CCNC: 156 U/L (ref 45–117)
ALT SERPL-CCNC: 24 U/L (ref 12–78)
ANION GAP SERPL CALC-SCNC: 7 MMOL/L (ref 5–15)
AST SERPL-CCNC: 66 U/L (ref 15–37)
BILIRUB SERPL-MCNC: 0.7 MG/DL (ref 0.2–1)
BNP SERPL-MCNC: 2191 PG/ML
BUN SERPL-MCNC: 21 MG/DL (ref 6–20)
BUN/CREAT SERPL: 24 (ref 12–20)
CALCIUM SERPL-MCNC: 8.6 MG/DL (ref 8.5–10.1)
CHLORIDE SERPL-SCNC: 102 MMOL/L (ref 97–108)
CO2 SERPL-SCNC: 27 MMOL/L (ref 21–32)
CREAT SERPL-MCNC: 0.86 MG/DL (ref 0.7–1.3)
ERYTHROCYTE [DISTWIDTH] IN BLOOD BY AUTOMATED COUNT: 18.4 % (ref 11.5–14.5)
GLOBULIN SER CALC-MCNC: 3.4 G/DL (ref 2–4)
GLUCOSE BLD STRIP.AUTO-MCNC: 103 MG/DL (ref 65–117)
GLUCOSE BLD STRIP.AUTO-MCNC: 137 MG/DL (ref 65–117)
GLUCOSE BLD STRIP.AUTO-MCNC: 155 MG/DL (ref 65–117)
GLUCOSE BLD STRIP.AUTO-MCNC: 237 MG/DL (ref 65–117)
GLUCOSE SERPL-MCNC: 102 MG/DL (ref 65–100)
HCT VFR BLD AUTO: 25.7 % (ref 36.6–50.3)
HGB BLD-MCNC: 8.1 G/DL (ref 12.1–17)
INR PPP: 2.4 (ref 0.9–1.1)
LDH SERPL L TO P-CCNC: 175 U/L (ref 85–241)
MAGNESIUM SERPL-MCNC: 2.1 MG/DL (ref 1.6–2.4)
MCH RBC QN AUTO: 25.4 PG (ref 26–34)
MCHC RBC AUTO-ENTMCNC: 31.5 G/DL (ref 30–36.5)
MCV RBC AUTO: 80.6 FL (ref 80–99)
NRBC # BLD: 0 K/UL (ref 0–0.01)
NRBC BLD-RTO: 0 PER 100 WBC
PLATELET # BLD AUTO: 315 K/UL (ref 150–400)
PMV BLD AUTO: 9.6 FL (ref 8.9–12.9)
POTASSIUM SERPL-SCNC: 3.6 MMOL/L (ref 3.5–5.1)
PROT SERPL-MCNC: 6.2 G/DL (ref 6.4–8.2)
PROTHROMBIN TIME: 23.6 SEC (ref 9–11.1)
RBC # BLD AUTO: 3.19 M/UL (ref 4.1–5.7)
SERVICE CMNT-IMP: ABNORMAL
SERVICE CMNT-IMP: NORMAL
SODIUM SERPL-SCNC: 136 MMOL/L (ref 136–145)
WBC # BLD AUTO: 5.3 K/UL (ref 4.1–11.1)

## 2022-05-03 PROCEDURE — 93750 INTERROGATION VAD IN PERSON: CPT | Performed by: NURSE PRACTITIONER

## 2022-05-03 PROCEDURE — 36415 COLL VENOUS BLD VENIPUNCTURE: CPT

## 2022-05-03 PROCEDURE — 99233 SBSQ HOSP IP/OBS HIGH 50: CPT | Performed by: INTERNAL MEDICINE

## 2022-05-03 PROCEDURE — 74011250637 HC RX REV CODE- 250/637: Performed by: STUDENT IN AN ORGANIZED HEALTH CARE EDUCATION/TRAINING PROGRAM

## 2022-05-03 PROCEDURE — 74011250637 HC RX REV CODE- 250/637: Performed by: NURSE PRACTITIONER

## 2022-05-03 PROCEDURE — 74011250636 HC RX REV CODE- 250/636: Performed by: NURSE PRACTITIONER

## 2022-05-03 PROCEDURE — 74011636637 HC RX REV CODE- 636/637: Performed by: NURSE PRACTITIONER

## 2022-05-03 PROCEDURE — 93750 INTERROGATION VAD IN PERSON: CPT | Performed by: INTERNAL MEDICINE

## 2022-05-03 PROCEDURE — 80053 COMPREHEN METABOLIC PANEL: CPT

## 2022-05-03 PROCEDURE — 83880 ASSAY OF NATRIURETIC PEPTIDE: CPT

## 2022-05-03 PROCEDURE — 85027 COMPLETE CBC AUTOMATED: CPT

## 2022-05-03 PROCEDURE — 83615 LACTATE (LD) (LDH) ENZYME: CPT

## 2022-05-03 PROCEDURE — P9047 ALBUMIN (HUMAN), 25%, 50ML: HCPCS | Performed by: NURSE PRACTITIONER

## 2022-05-03 PROCEDURE — 74011000250 HC RX REV CODE- 250: Performed by: NURSE PRACTITIONER

## 2022-05-03 PROCEDURE — 71045 X-RAY EXAM CHEST 1 VIEW: CPT

## 2022-05-03 PROCEDURE — 74011250637 HC RX REV CODE- 250/637: Performed by: INTERNAL MEDICINE

## 2022-05-03 PROCEDURE — 97116 GAIT TRAINING THERAPY: CPT

## 2022-05-03 PROCEDURE — 65660000001 HC RM ICU INTERMED STEPDOWN

## 2022-05-03 PROCEDURE — APPSS60 APP SPLIT SHARED TIME 46-60 MINUTES: Performed by: NURSE PRACTITIONER

## 2022-05-03 PROCEDURE — 86900 BLOOD TYPING SEROLOGIC ABO: CPT

## 2022-05-03 PROCEDURE — 85610 PROTHROMBIN TIME: CPT

## 2022-05-03 PROCEDURE — 82962 GLUCOSE BLOOD TEST: CPT

## 2022-05-03 PROCEDURE — 83735 ASSAY OF MAGNESIUM: CPT

## 2022-05-03 RX ORDER — WARFARIN SODIUM 5 MG/1
5 TABLET ORAL ONCE
Status: COMPLETED | OUTPATIENT
Start: 2022-05-03 | End: 2022-05-03

## 2022-05-03 RX ADMIN — ASPIRIN 81 MG CHEWABLE TABLET 81 MG: 81 TABLET CHEWABLE at 08:31

## 2022-05-03 RX ADMIN — SODIUM CHLORIDE, PRESERVATIVE FREE 10 ML: 5 INJECTION INTRAVENOUS at 07:20

## 2022-05-03 RX ADMIN — WARFARIN SODIUM 5 MG: 5 TABLET ORAL at 16:28

## 2022-05-03 RX ADMIN — FAMOTIDINE 20 MG: 20 TABLET ORAL at 08:31

## 2022-05-03 RX ADMIN — GUAIFENESIN 100 MG: 200 SOLUTION ORAL at 23:44

## 2022-05-03 RX ADMIN — HYDRALAZINE HYDROCHLORIDE 50 MG: 50 TABLET, FILM COATED ORAL at 11:49

## 2022-05-03 RX ADMIN — HYDRALAZINE HYDROCHLORIDE 50 MG: 50 TABLET, FILM COATED ORAL at 21:26

## 2022-05-03 RX ADMIN — SACUBITRIL AND VALSARTAN 1 TABLET: 49; 51 TABLET, FILM COATED ORAL at 21:26

## 2022-05-03 RX ADMIN — SACUBITRIL AND VALSARTAN 1 TABLET: 49; 51 TABLET, FILM COATED ORAL at 08:31

## 2022-05-03 RX ADMIN — Medication 2 UNITS: at 11:49

## 2022-05-03 RX ADMIN — SODIUM CHLORIDE, PRESERVATIVE FREE 10 ML: 5 INJECTION INTRAVENOUS at 15:20

## 2022-05-03 RX ADMIN — SUCRALFATE 1 G: 1 TABLET ORAL at 07:20

## 2022-05-03 RX ADMIN — BUMETANIDE 2 MG: 1 TABLET ORAL at 15:19

## 2022-05-03 RX ADMIN — MIRTAZAPINE 7.5 MG: 15 TABLET, FILM COATED ORAL at 21:26

## 2022-05-03 RX ADMIN — BUMETANIDE 2 MG: 1 TABLET ORAL at 08:31

## 2022-05-03 RX ADMIN — ALBUMIN (HUMAN) 12.5 G: 0.25 INJECTION, SOLUTION INTRAVENOUS at 08:28

## 2022-05-03 RX ADMIN — HYDRALAZINE HYDROCHLORIDE 50 MG: 50 TABLET, FILM COATED ORAL at 05:53

## 2022-05-03 RX ADMIN — Medication 400 MG: at 08:31

## 2022-05-03 RX ADMIN — SUCRALFATE 1 G: 1 TABLET ORAL at 11:49

## 2022-05-03 RX ADMIN — ACETAMINOPHEN 650 MG: 325 TABLET ORAL at 16:28

## 2022-05-03 RX ADMIN — EPLERENONE 25 MG: 25 TABLET, FILM COATED ORAL at 08:31

## 2022-05-03 RX ADMIN — SODIUM CHLORIDE, PRESERVATIVE FREE 10 ML: 5 INJECTION INTRAVENOUS at 21:27

## 2022-05-03 RX ADMIN — COLCHICINE 0.6 MG: 0.6 TABLET, FILM COATED ORAL at 08:31

## 2022-05-03 RX ADMIN — ALBUMIN (HUMAN) 12.5 G: 0.25 INJECTION, SOLUTION INTRAVENOUS at 17:17

## 2022-05-03 RX ADMIN — DOCUSATE SODIUM 100 MG: 100 CAPSULE, LIQUID FILLED ORAL at 08:31

## 2022-05-03 RX ADMIN — SUCRALFATE 1 G: 1 TABLET ORAL at 16:28

## 2022-05-03 RX ADMIN — AMIODARONE HYDROCHLORIDE 200 MG: 200 TABLET ORAL at 08:31

## 2022-05-03 RX ADMIN — SUCRALFATE 1 G: 1 TABLET ORAL at 21:26

## 2022-05-03 RX ADMIN — FAMOTIDINE 20 MG: 20 TABLET ORAL at 21:27

## 2022-05-03 RX ADMIN — POLYETHYLENE GLYCOL 3350 17 G: 17 POWDER, FOR SOLUTION ORAL at 08:31

## 2022-05-03 NOTE — PROGRESS NOTES
0730: Bedside and Verbal shift change report given to [facility] and BelindaRN (oncoming nurse) by Daisha Ball and Halie Phelps (offgoing nurse). Report included the following information SBAR, Kardex, Intake/Output, MAR, Recent Results and Cardiac Rhythm Sinus arrythmia. Preceptor Review of RN Work    5/3/2022  - Shift times - 0730 to 1930    The RN documentation of patient care for Shola Carver has been reviewed and approved. All medications have been administered under the direct supervision of the RN preceptor.     Radha Pennington

## 2022-05-03 NOTE — PROGRESS NOTES
Occupational Therapy:    Chart reviewed and attempted to see for OT session, however patient unavailable each time. Will continue to follow up and attempt as able.      Dora Maldonado, OT

## 2022-05-03 NOTE — PROGRESS NOTES
Verbal bedside report given to Gilberto Caceres RN oncoming nurse by Basil Francis. Yair Powell RN off-going nurse. Report included current pt status and condition, recent results, hx of present illness, heart rate and rhythm, and respiratory status. 1600  Changed abdominal drain site dressing, moderate exudate, no odor. 0830  Pt awake, alert and feeling well this morning. No pain or distress, VSS. Reviewed LVAD emergency processes.

## 2022-05-03 NOTE — PROGRESS NOTES
NIC[facility] Hemodynamic Monitoring       Visit Vitals  /88   Pulse 82   Temp 99 °F (37.2 °C)   Resp 18   Ht 6' 1\" (1.854 m)   Wt 80.2 kg (176 lb 12.9 oz)   SpO2 99%   BMI 23.33 kg/m²         Baseline assessment:        CO 5.6        CI 2.6        SVI 32        SVV 15        TPR 1042 after map of 86

## 2022-05-03 NOTE — PROGRESS NOTES
Transitions of Care Plan   RUR: 20% - high   Clinical Update: trial of dobutamine wean   Consults: [facility]; Therapy  · Baseline: independent without DME; resides w wife  · Barrier(s) to Discharge: medical  · Disposition: home health - [facility]  · Estimated Discharge Date: 5/4/22    CM received update from [facility] NP - trial of dobutamine wean this week. Goal discharge date is Friday - 5/6/22.     Disposition:  [facility] (if requires dobutamine)    Anders Foster, [address]  Available via Just[employer] or

## 2022-05-03 NOTE — PROGRESS NOTES
Bedside shift change report given to [address] [place] [address] (oncoming nurse) by Nikki Ramires RN (offgoing nurse). Report included the following information SBAR, Kardex, ED Summary, Intake/Output, MAR and Cardiac Rhythm SA. Problem: Falls - Risk of  Goal: *Absence of Falls  Description: Document Linda Hernández Fall Risk and appropriate interventions in the flowsheet. Outcome: Progressing Towards Goal  Note: Fall Risk Interventions:  Mobility Interventions: Bed/chair exit alarm,Communicate number of staff needed for ambulation/transfer,Patient to call before getting OOB    Medication Interventions: Bed/chair exit alarm,Evaluate medications/consider consulting pharmacy,Patient to call before getting OOB,Teach patient to arise slowly    Elimination Interventions: Bed/chair exit alarm,Call light in reach,Patient to call for help with toileting needs,Urinal in reach,Toileting schedule/hourly rounds     Problem: Diabetes Self-Management  Goal: *Disease process and treatment process  Description: Define diabetes and identify own type of diabetes; list 3 options for treating diabetes. Outcome: Progressing Towards Goal  Goal: *Incorporating nutritional management into lifestyle  Description: Describe effect of type, amount and timing of food on blood glucose; list 3 methods for planning meals. Outcome: Progressing Towards Goal  Goal: *Incorporating physical activity into lifestyle  Description: State effect of exercise on blood glucose levels. Outcome: Progressing Towards Goal  Goal: *Developing strategies to promote health/change behavior  Description: Define the ABC's of diabetes; identify appropriate screenings, schedule and personal plan for screenings. Outcome: Progressing Towards Goal  Goal: *Using medications safely  Description: State effect of diabetes medications on diabetes; name diabetes medication taking, action and side effects.   Outcome: Progressing Towards Goal  Goal: *Monitoring blood glucose, interpreting and using results  Description: Identify recommended blood glucose targets  and personal targets. Outcome: Progressing Towards Goal  Goal: *Developing strategies to address psychosocial issues  Description: Describe feelings about living with diabetes; identify support needed and support network  Outcome: Progressing Towards Goal     Problem: Pressure Injury - Risk of  Goal: *Prevention of pressure injury  Description: Document Jl Scale and appropriate interventions in the flowsheet.   Outcome: Progressing Towards Goal  Note: Pressure Injury Interventions:  Sensory Interventions: Assess changes in LOC,Keep linens dry and wrinkle-free,Maintain/enhance activity level,Minimize linen layers,Avoid rigorous massage over bony prominences    Moisture Interventions: Absorbent underpads,Limit adult briefs    Activity Interventions: Increase time out of bed    Mobility Interventions: PT/OT evaluation,Pressure redistribution bed/mattress (bed type)    Nutrition Interventions: Document food/fluid/supplement intake    Friction and Shear Interventions: HOB 30 degrees or less,Minimize layers       Problem: Patient Education: Go to Patient Education Activity  Goal: Patient/Family Education  Outcome: Progressing Towards Goal     Problem: Heart Failure: Day 5  Goal: Activity/Safety  Outcome: Progressing Towards Goal  Goal: Diagnostic Test/Procedures  Outcome: Progressing Towards Goal  Goal: Respiratory  Outcome: Progressing Towards Goal  Goal: Psychosocial  Outcome: Progressing Towards Goal     Problem: Heart Failure: Discharge Outcomes  Goal: *Verbalizes understanding/describes prescribed medications  Outcome: Progressing Towards Goal  Goal: *Describes available resources and support systems  Description: (eg: Home Health, Palliative Care, Advanced Medical Directive)  Outcome: Progressing Towards Goal     Problem: LVAD Post-op Day 15 to Discharge  Goal: Activity/Safety  Outcome: Progressing Towards Goal  Goal: Diagnostic Test/Procedures  Outcome: Progressing Towards Goal  Goal: Nutrition/Diet  Outcome: Progressing Towards Goal  Goal: Medications  Outcome: Progressing Towards Goal  Goal: Respiratory  Outcome: Progressing Towards Goal  Goal: Psychosocial  Outcome: Progressing Towards Goal     Problem: LVAD: Discharge Outcomes  Goal: *Hemodynamically stable  Outcome: Progressing Towards Goal  Goal: *Lungs clear or at baseline  Outcome: Progressing Towards Goal  Goal: *Optimal pain control at patient's stated goal  Outcome: Progressing Towards Goal  Goal: *Demonstrates progressive activity  Outcome: Progressing Towards Goal  Goal: *Tolerating diet  Outcome: Progressing Towards Goal  Goal: *LVAD parameters within set limits  Outcome: Progressing Towards Goal  Goal: *Demonstrates effective coping  Outcome: Progressing Towards Goal  Goal: *Patient/caregiver is able to perform drive line dressing change independently  Outcome: Progressing Towards Goal  Goal: *LVAD drive line site without signs and symptoms of wound complications  Outcome: Progressing Towards Goal

## 2022-05-03 NOTE — PROGRESS NOTES
Problem: Mobility Impaired (Adult and Pediatric)  Goal: *Acute Goals and Plan of Care (Insert Text)  Description: FUNCTIONAL STATUS PRIOR TO ADMISSION: Patient was independent and active without use of DME.    HOME SUPPORT PRIOR TO ADMISSION: The patient lived with his wife but did not require assist.    Physical Therapy Goals- Continue goals with exception of #7 for next 7 days 4/22/2022, continue all goals 4/29/2022    Weekly Reassessment 4/15/2022 (goals progressed)  1. Patient will move from supine to sit and sit to supine , scoot up and down, and roll side to side in bed with head of bed flat with supervision/set-up within 7 days. 2.  Patient will perform sit to/from stand from chair height with supervision/set-up within 7 days. 3.  Patient will ambulate 300 feet stand by assist within 7 days. 4.  Patient will ascend/descend 6 stairs with handrail(s) with minimal assistance/contact guard assist within 14 days. 5.  Patient will perform cardiac exercises per protocol with supervision/set-up within 7 days. 6.  Patient will verbally and functionally recall mindful movement precautions within 7 days. 7.  Patient will perform power exchange for power module to/from battery with stand by assist within 7 days. -MET currently independent 4/22/2022    Initiated 4/8/2022  1. Patient will move from supine to sit and sit to supine , scoot up and down, and roll side to side in bed with supervision/set-up within 7 days. 2.  Patient will perform sit to/from stand with supervision/set-up within 7 days. 3.  Patient will ambulate 50 feet with least restrictive assistive device and minimal assistance/contact guard assist within 7 days. 4.  Patient will ascend/descend 6 stairs with handrail(s) with minimal assistance/contact guard assist within 14 days. 5.  Patient will perform cardiac exercises per protocol with supervision/set-up within 7 days.   6.  Patient will verbally and functionally recall 3/3 sternal precautions within 7 days. 7.  Patient will perform power exchange for power module to/from battery with minimal assistance within 7 days. Outcome: Progressing Towards Goal  PHYSICAL THERAPY TREATMENT  Patient: [address] (30 y.o. male)  Date: 5/3/2022  Diagnosis: HFrEF (heart failure with reduced ejection fraction) ([facility] [I50.20] <principal problem not specified>  Procedure(s) (LRB):  REDO STERNOTOMY; RIGHT GROIN CUTDOWN FOR CANNULATION;  INSERTION OF LEFT VENTRICULAR ASSIST DEVICE (HMIII); AORTIC VALVE CLOSURE CHEL BY DR. Lorenza Davila. (N/A) 28 Days Post-Op  Precautions: Fall (mindful movement, LVAD )  Chart, physical therapy assessment, plan of care and goals were reviewed. ASSESSMENT  Patient continues with skilled PT services and is progressing towards goals. Patient received supine in bed, on power module and agreeable to therapy. Independent with switch over to battery power and able to participate in gait training, similar distance as yesterday with increased [place]. SOB noted with activity but easily recovered with standing rest, and SOB not any worse compared to yesterday. Note CTs removed and dobut weaned to 4mcg/kg/min from 5 yesterday. Recommend continued ambulation daily with RN/PCT 1-2 times. Recommend HHPT. F NP requested repeat 6MWT tomorrow to determine tolerance of/ability to wean dobut further. Current Level of Function Impacting Discharge (mobility/balance): supervision    Other factors to consider for discharge: attempting dobut wean         PLAN :  Patient continues to benefit from skilled intervention to address the above impairments. Continue treatment per established plan of care. to address goals.     Recommendation for discharge: (in order for the patient to meet his/her long term goals)  Physical therapy at least 2 days/week in the home     This discharge recommendation:  Has been made in collaboration with the attending provider and/or case management    IF patient discharges home will need the following DME: none       SUBJECTIVE:   Patient stated Where to now?     OBJECTIVE DATA SUMMARY:       Critical Behavior:  Neurologic State: Alert  Orientation Level: Oriented X4  Cognition: Appropriate decision making,Follows commands  Functional Mobility Training:  Bed Mobility:  Supine to Sit: Modified independent  Scooting: Modified independent  Transfers:  Sit to Stand: Supervision  Stand to Sit: Supervision  Balance:  Sitting: Intact  Standing: Intact; With support  Ambulation/Gait Training:  Distance (ft): 700 Feet (ft)  Ambulation - Level of Assistance: Supervision  Gait Abnormalities: Decreased step clearance  Speed/Charlene: Pace decreased (<100 feet/min)  Step Length: Right shortened;Left shortened    VAD power transition  Patient performed switchover from power module to battery. Completed the following tasks:   Independent Verbal cues Physical assist Comments   Don holster vest x      Check batteries x      Check  x      Ensure  clipped onto stabilization belt x      Position batteries in clips x      Disconnect power leads x      Insert power lead into battery x      [place] batteries in holster  x      Secure batteries with velcro and clips x        Pain Rating:  Denied pain    Activity Tolerance:   Good and requires rest breaks      After treatment patient left in no apparent distress:   Sitting in chair, Call bell within reach, and Bed / chair alarm activated    COMMUNICATION/COLLABORATION:   The patients plan of care was discussed with: Occupational therapist and Registered nurse.      Sana Chan PT, DPT   Time Calculation: 29 mins

## 2022-05-03 NOTE — PROGRESS NOTES
0730: Preceptor Review of RN Work    5/3/2022  - Shift times - 1930 to 0730    The RN documentation of patient care for [address] [place] [address] has been reviewed and approved. All medications have been administered under the direct supervision of the preceptor.     Martin Tafoya RN

## 2022-05-03 NOTE — PROGRESS NOTES
[facility] in [place], [place]  Inpatient Progress Note      Patient name: Ross Day  Patient : 1945  Patient MRN: [id number]  Consulting MD: David Mcdonough MD  Date of service: 22      REASON FOR REFERRAL:  Management of chronic systolic heart failure, LVAD     PLAN OF CARE:  · 69 y.o. male with severe ischemic cardiomyopathy, LVEF 15-20% s/p HM3 LVAD implant as DT on 22 by Dr. Stacy Martinez c/b intraoperative bleeding requiring multiple blood products and subsequent RV failure with severe TR on high dose dobutamine IV  · Pt reports that he \"does not want to go home attached to anything other than the LVAD\"; is not interested in [address] catheter or IV dobutamine at home- will work towards weaning dobut if able, and discuss plans for CT removal and monitoring of effusions with CT surgery  · Plan for remainder of hospitalization includes:  ? Surveillance weekly echos to determine if RV function improves and TV coapts  ? PT/OT/Nutrition  ? Optimization of GDMT       RECOMMENDATIONS:  POD 26  TTE  showed severe TR, severe RV dysfunction; continue to optimize medically   Continue speed 4800rpms, low speed 4400  Trial weaning dobutamine, decrease to 4mcg/kg/min  Pt will likely need to D/C home on dobutamine as bridge to RV recovery  Continue hydralazine 50 mg PO q8h   Continue amiodarone 200 mg PO daily x 2 weeks   Continue Entresto 49/51mg BID  Consider addition of digoxin for RV support   Cont Eplerenone 25mg daily  Continue Bumex 2 mg PO BID    Completed course of cefepime for sternal incision  Driveline dressing changes daily for now   Needs EKG weekly   Continue warfarin, goal INR 2-3; 5 mg tonight   ASA 81mg daily  Continue colchicine for pericarditis   D/C'd gabapentin- pt reports tremors with this and does not want to take it.    IV fentanyl prn (pt with hx anaphylaxis to oxycodone)  PRN toradol  Continue bowel regimen   CPAP qHS  Advanced care plan forms on file   Strict I/O, daily weights, Na+ restricted diet   Continue VAD education   Equipment ordered  6 Min walk tomorrow    Tentative D/C date Friday       INTERVAL HISTORY:  CT removed, no ptx post removal   Pt reports feeling well today  Net -800mL  Creatinine 0.8, PBNP 2100  T Bili 0.7  Dobutamine decreased to 5mcg/kg/min  Walked 181 meters    IMPRESSION:  POD 26 S/p LVAD implant as DT  · 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  · Persistent large volume chest tube output  Chronic systolic heart failure  · Stage D, NYHA class IV symptoms  · Non-ischemic cardiomyopathy, LVEF 20% with LVEDD 6.2  · RV dysfunction, TAPSE 1.22 (prelimnary read)  · TBili 1.5 likely from cardiac congestion  At risk of sudden cardiac death  · Recent cardiac arrest   · S/p ICD (1/2013, [employer] followed by [facility]); New implant on 10/21/2021 [employer]  Coronary artery disease  · S/p multiple interventions  · S/p 4V CABG (8/2012)  · LHC (7/2019) severe stenosis of LIMA to LAD anastomosis site, SVG graft to OM is occluded, SVG graft to RCA 40-50%, LAD occluded proximally, severe proximal LCx, RCA is the long . · PET (6/2019) EF 24% with anterior lateral and inferior reversible defect  Cardiac risk factors  · HTN  · HL  · DM2  · SRUTHI on CPAP  · MIld carotid stenosis  Anemia, microcytic  · Iron deficiency  DVT with filter  Pulmonary nodules   Dysphagia       LIFE GOALS:  Patient's personal goals include: getting stronger and going home soon  Important upcoming milestones or family events: The patient identifies the following as important for living well: being independent, being at home, enjoying family time      [facility]:  Echo 4/26/22 - LVIDd 4.9 cm, TAPSE 0.5 cm,   Echo 4/18/22- LVEF 15-20%, severe TR, TAPSE 0.5cm, RVIDd 6cm   Echo (4/8/22)    Left Ventricle: Left ventricle size is normal. Moderately increased wall thickness.  Findings consistent with concentric remodeling. Severe global hypokinesis present. Severely reduced left ventricular systolic function with a visually estimated EF of 10 -15%. 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   Aortic Valve: Moderate sclerosis of the aortic valve cusp.   Mitral Valve: Moderately thickened leaflet. Mild transvalvular regurgitation.   Tricuspid Valve: Severe transvalvular regurgitation.   Right Atrium: Right atrium is severely dilated.     Echo (3/2/22)   · LV 10-15%  · Mod-severe MR      Echo (11/23/21):  · LV 15-20%.    · Mod-severe, MR, mod-TR     Echo (5/20/21)  · LV: Estimated LVEF is 20 - 25%. Normal wall thickness. Mildly dilated left ventricle. Severely and globally reduced systolic function. Severe (grade 4) left ventricular diastolic dysfunction. · LA: Severely dilated left atrium. · RA: Severely dilated right atrium. · MV: Moderate mitral valve regurgitation is present. · TV: Moderate tricuspid valve regurgitation is present. · AO: Mild aortic root dilatation. · RV: Pacer/ICD present. · LVED 6.2cm     EKG (5/20/21) SB with 1degree AV block, QRS 116ms     LHC (5/24/21) Native Coronaries: LM - moderate to severe distal disease 50%. % prox occluded (), heavily calcified, LCx: 80% proximal stenosis. OM1 is  (seen filling faintly via collaterals). OM2 99% stenosis. RCA: 100% proximal occluded.  LIMA to LAD: patent, supplies only a diagonal branch (probably D2). The LAD distal to the bifurcation is 100% occluded () and fills via right to left collaterals off the SVG to RCA. SVG to R-PDA: patent with moderate diffuse irregularities but no obstructive disease in the graft.    No appealing interventional targets. 6 Min Walk Report 5/2/2022 11/12/2021 7/6/2021 5/20/2021   (PRE) HR 82 88 98 74   (PRE) O2 Sat 98 100 - 99   (POST) HR 88 102 - 81   (POST) O2 Sat 98 99 98% on Ra 99   Distance in Meters 181.36 386. 58 - 1158.24         BRIEF HISTORY OF PRESENT ILLNESS:  Shola Carver is a 69 y.o. male with h/o HTN, HL, DM2, SRUTHI on CPAP, chronic systolic heart failure, stage D, NYHA class IV symptoms, non-ischemic cardiomyopathy, LVEF 20% with LVEDD 6.2, RV dysfunciton, TAPSE 1.22, TBili 1.5 likely from cardiac congestion, recent cardiac arrest s/p ICD (1/2013, [employer] followed by [facility]), coronary artery disease s/p multiple interventions s/p 4V CABG (8/2012), LHC (7/2019) severe stenosis of LIMA to LAD anastomosis site, SVG graft to OM is occluded, SVG graft to RCA 40-50%, LAD occluded proximally, severe proximal LCx, RCA is the long . PET (6/2019) EF 24% with anterior lateral and inferior reversible defect. Anemia, microcytic with iron deficiency, DVT with filter.     [facility] Dr. Nita Sanchez in 2021 for evaluation of his candidacy for advanced therapies.     Patient agreed to inpatient initiation of palliative infusion of chronic inotropes and evaluation for LVAD-DT. Patient was admitted 5/2-5/25/21. Patient was started on milrinone infusion and had first part of LVAD evaluation completed. Right and left heart cath on 5/24/21 showed severe diffuse native vessel CAD, with patent grafts (LIMA to D2, SVG to RCA).  Pt was found to have pulmonary nodule during workup. Antiplatelet therapy was held during hospitalization and after discharge due to anticipated pulmonary nodule biopsy, bone marrow biopsy and EGD/C-Scope as part of LVAD workup. Pt found to have adenocarcinoma of the lung, and so LVAD was deferred pending treatment. Patient completed radiation treatment for adenocarcinoma of the lung. Hem-onc cleared patient for VAD implant with 90% 2 year prognosis.      He was most recently admitted for elective pre-op EGD and colonoscopy which he tolerated well; path negative for malignancy.      He presented to [facility] on 4/3 for admission for planned LVAD implant. Underwent LVAD implant on 4/5/22.   Was re-do sternotomy, bleeding intra-op, required cryo, k-centra, FFP, Plt, and cellsaver in OR. Had RV dysfunction noted intra-op; requiring lower VAD speed and dual inotrope support. Inotropic support was able to be decreased, but pt now remains on dobutamine at 6mcg/kg/min.      Pt ans wife have been doing well with LVAD education. Pt has been OOB, is eating well and moving his bowels. Therapeutic on warfarin. REVIEW OF SYSTEMS:  Review of Systems   Constitutional: Negative for chills, fever, malaise/fatigue and weight loss. Respiratory: Negative for cough and shortness of breath. Cardiovascular: Negative for chest pain, palpitations, orthopnea and leg swelling. Gastrointestinal: Negative for abdominal pain, constipation, diarrhea, heartburn, nausea and vomiting. Neurological: Positive for tremors. Negative for dizziness and weakness. PHYSICAL EXAM:  Visit Vitals  /88   Pulse 85   Temp 98.2 °F (36.8 °C)   Resp 20   Ht 6' 1\" (1.854 m)   Wt 172 lb 6.4 oz (78.2 kg)   SpO2 98%   BMI 22.75 kg/m²       LVAD INTERROGATION:  Device interrogated in person  Device function normal, normal flow, no events  LVAD   Pump Speed (RPM): 4800  Pump Flow (LPM): 3.8  MAP: 90  PI (Pulsitility Index): 4.3  Power: 3.1   Test: Yes  Back Up  at Bedside & Labeled: Yes  Power Module Test: Yes  Driveline Site Care: No  Driveline Dressing: Clean, Dry, and Intact  MAP in Therapeutic Range (Outpatient): Yes  Testing  Alarms Reviewed: No  Back up SC speed: 4800  Back up Low Speed Limit: 4400  Emergency Equipment with Patient?: Yes  Emergency procedures reviewed?: Yes  Drive line site inspected?: Yes  Drive line intergrity inspected?: Yes  Drive line dressing changed?: No      Physical Exam  Vitals and nursing note reviewed. Constitutional:       General: He is not in acute distress. Appearance: Normal appearance.    Cardiovascular:      Rate and Rhythm: Normal rate and regular rhythm. Comments: LVAD Hum noted on auscultation  Pulmonary:      Effort: Pulmonary effort is normal. No respiratory distress. Abdominal:      General: Bowel sounds are normal. There is no distension. Palpations: Abdomen is soft. Tenderness: There is no abdominal tenderness. Musculoskeletal:      Right lower leg: No edema. Left lower leg: No edema. Skin:     General: Skin is warm and dry. Capillary Refill: Capillary refill takes less than 2 seconds. Neurological:      General: No focal deficit present. Mental Status: He is alert and oriented to person, place, and time. Psychiatric:         Mood and Affect: Mood normal.         Behavior: Behavior normal.         Thought Content: Thought content normal.         Judgment: Judgment normal.              PAST MEDICAL HISTORY:  Past Medical History:   Diagnosis Date    CAD (coronary artery disease) '90 '97, '13 x 2    MI, code ice in 2013 [facility]    Calculus of kidney     Colonic polyps     Congestive heart failure, unspecified     Diabetes ([facility] [address]     Gastritis     Hypercholesterolemia     Sleep apnea        PAST SURGICAL HISTORY:  Past Surgical History:   Procedure Laterality Date    COLONOSCOPY  04/06/2011    16, due 21    COLONOSCOPY N/A 3/2/2022    COLONOSCOPY    :- [facility] by Lennox Ort[facility] ENDOSCOPY    ENDOSCOPY, COLON, DIAGNOSTIC      11, due 16    HX CORONARY ARTERY BYPASS GRAFT  8/22/12    x 4 vessel by S.  James    HX HEENT      LASIK    HX PACEMAKER PLACEMENT  1/30/13    [place] CARDIAC SURG PROCEDURE UNLIST  2012    x 4 vessels       FAMILY HISTORY:  Family History   Problem Relation Age of Onset    Heart Disease Mother         MI    Heart Disease Father         CAD & PVD    Lung Disease Father     Cancer Father         lung    Diabetes Maternal Grandmother     Heart Disease Other         CAD    Stroke Sister        SOCIAL HISTORY:  Social History     Socioeconomic History    Marital status:    Tobacco Use    Smoking status: Never Smoker    Smokeless tobacco: Never Used   Vaping Use    Vaping Use: Never used   Substance and Sexual Activity    Alcohol use: Yes     Alcohol/week: 0.0 standard drinks     Comment:  VERY RARE    Drug use: No       LABORATORY RESULTS:     Labs Latest Ref Rng & Units 5/3/2022 5/2/2022 5/1/2022 4/30/2022 4/29/2022 4/28/2022 4/27/2022   WBC 4.1 - 11.1 K/uL 5.3 5.7 5.5 5.6 5.9 4.8 5.0   RBC 4.10 - 5.70 M/uL 3.19(L) 3.08(L) 2.92(L) 2.95(L) 3.23(L) 2.91(L) 2.99(L)   Hemoglobin 12.1 - 17.0 g/dL 8. 1(L) 7. 9(L) 7. 6(L) 7. 6(L) 8.4(L) 7. 5(L) 7. 7(L)   Hematocrit 36.6 - 50.3 % 25. 7(L) 25. 8(L) 24. 7(L) 24. 6(L) 26. 6(L) 24. 2(L) 25. 0(L)   MCV 80.0 - 99.0 FL 80.6 83.8 84.6 83.4 82.4 83.2 83.6   Platelets 309 - 970 K/uL 315 298 274 275 299 261 257   Lymphocytes 12 - 49 % - - - - - - -   Monocytes 5 - 13 % - - - - - - -   Eosinophils 0 - 7 % - - - - - - -   Basophils 0 - 1 % - - - - - - -   Bands 0 - 6 % - - - - - - -   Albumin 3.5 - 5.0 g/dL 2. 8(L) 2. 9(L) 2. 6(L) 2. 8(L) 3.0(L) 2. 8(L) 2. 9(L)   Calcium 8.5 - 10.1 MG/DL 8.6 8.2(L) 8.0(L) 7. 7(L) 7. 9(L) 8.2(L) 8.6   Glucose 65 - 100 mg/dL 102(H) 95 199(H) 125(H) 117(H) 93 92   BUN 6 - 20 MG/DL 21(H) 18 17 24(H) 23(H) 26(H) 26(H)   Creatinine 0.70 - 1.30 MG/DL 0.86 0.86 0.86 1.01 1.00 1.08 0.96   Sodium 136 - 145 mmol/L 136 138 133(L) 137 136 134(L) 135(L)   Potassium 3.5 - 5.1 mmol/L 3.6 3.6 3.7 4.0 3.9 4.0 3.9   TSH 0.36 - 3.74 uIU/mL - - - - - - -   LDH 85 - 241 U/L 175 163 177 158 176 188 169   Some recent data might be hidden     Lab Results   Component Value Date/Time    TSH 2.26 04/10/2022 03:00 AM    TSH 1.68 03/01/2022 11:50 AM    TSH 1.47 05/26/2021 10:44 PM    TSH 1.97 05/20/2021 04:28 PM    TSH 1.41 02/09/2021 03:05 PM    TSH 1.740 08/14/2018 09:58 AM    TSH 1.710 10/18/2017 12:00 AM       ALLERGY:  Allergies   Allergen Reactions    Oxycodone Anaphylaxis    Pcn [Penicillins] Hives        CURRENT MEDICATIONS:    Current Facility-Administered Medications:     warfarin (COUMADIN) tablet 5 mg, 5 mg, Oral, ONCE, Terry, Danica B, NP    sacubitriL-valsartan (ENTRESTO) 49-51 mg tablet 1 Tablet, 1 Tablet, Oral, Q12H, Iris Gaines B, NP, 1 Tablet at 05/03/22 0831    magnesium oxide (MAG-OX) tablet 400 mg, 400 mg, Oral, DAILY, Jf Wong, NP, 400 mg at 05/03/22 0831    bumetanide (BUMEX) tablet 2 mg, 2 mg, Oral, BID, Junaid Wong, NP, 2 mg at 05/03/22 0831    amiodarone (CORDARONE) tablet 200 mg, 200 mg, Oral, DAILY, Jf Wong, NP, 200 mg at 05/03/22 0831    DOBUTamine (DOBUTREX) 1,000 mg/250 mL (4,000 mcg/mL) infusion, 4 mcg/kg/min, IntraVENous, CONTINUOUS, Terry Danica B, NP, Last Rate: 5 mL/hr at 05/03/22 0912, 4 mcg/kg/min at 05/03/22 0912    sodium chloride (OCEAN) 0.65 % nasal squeeze bottle 2 Spray, 2 Spray, Both Nostrils, Q2H PRN, Terry, Danica B, NP    docusate sodium (COLACE) capsule 100 mg, 100 mg, Oral, DAILY, Terry, Danica B, NP, 100 mg at 05/03/22 0831    eplerenone (INSPRA) tablet 25 mg, 25 mg, Oral, DAILY, Terry, Danica B, NP, 25 mg at 05/03/22 0831    guaiFENesin (ROBITUSSIN) 100 mg/5 mL oral liquid 100 mg, 100 mg, Oral, Q4H PRN, Destinee Borrego MD, 100 mg at 04/30/22 0047    polyethylene glycol (MIRALAX) packet 17 g, 17 g, Oral, DAILY, Iris Eder B, NP, 17 g at 05/03/22 0831    senna-docusate (PERICOLACE) 8.6-50 mg per tablet 1 Tablet, 1 Tablet, Oral, DAILY PRN, Iris JACOBSON, NP, 1 Tablet at 04/24/22 0913    oxymetazoline (AFRIN) 0.05 % nasal spray 2 Spray, 2 Spray, Both Nostrils, BID PRN, Iris JACOBSON, NP, 2 Spray at 04/24/22 1612    hydrALAZINE (APRESOLINE) tablet 50 mg, 50 mg, Oral, Q8H, Millie Wong NP, 50 mg at 05/03/22 0553    colchicine tablet 0.6 mg, 0.6 mg, Oral, DAILY, Jf Wong NP, 0.6 mg at 05/03/22 0831    alteplase (CATHFLO) 1 mg in sterile water (preservative free) 1 mL injection, 1 mg, InterCATHeter, PRN, Ade Denson MD, 1 mg at 05/02/22 0041    bisacodyL (DULCOLAX) suppository 10 mg, 10 mg, Rectal, DAILY PRN, Ashley Guerra MD, 10 mg at 04/12/22 1533    albumin human 25% (BUMINATE) solution 12.5 g, 12.5 g, IntraVENous, BID, Polliard, Oneda Kiespinoza T, NP, 12.5 g at 05/03/22 7534    famotidine (PEPCID) tablet 20 mg, 20 mg, Oral, Q12H, Ashley Guerra MD, 20 mg at 05/03/22 0831    mirtazapine (REMERON) tablet 7.5 mg, 7.5 mg, Oral, QHS, Zeus Stewartin B, NP, 7.5 mg at 05/02/22 2152    lidocaine 4 % patch 1 Patch, 1 Patch, TransDERmal, Q24H, Zeus Stewartin KAVON, NP, 1 Patch at 04/30/22 1250    Warfarin NP/MD dosing, , Other, PRN, Terry, Danica B, NP    melatonin tablet 3 mg, 3 mg, Oral, QHS PRN, Ga Caban MD, 3 mg at 04/20/22 2127    hydrALAZINE (APRESOLINE) 20 mg/mL injection 10 mg, 10 mg, IntraVENous, Q6H PRN, Brando Tran B, NP, 10 mg at 04/24/22 1415    aspirin chewable tablet 81 mg, 81 mg, Oral, DAILY, Brando Tran B, NP, 81 mg at 05/03/22 0831    hydrALAZINE (APRESOLINE) 20 mg/mL injection 5 mg, 5 mg, IntraVENous, Q6H PRN, Brando Abelson B, NP, 5 mg at 04/13/22 9834    sodium chloride (NS) flush 5-40 mL, 5-40 mL, IntraVENous, Q8H, Millie Wong NP, 10 mL at 05/03/22 0720    sodium chloride (NS) flush 5-40 mL, 5-40 mL, IntraVENous, PRN, Polliard, Oneda Garret T, NP, 10 mL at 04/24/22 0914    0.9% sodium chloride infusion, 9 mL/hr, IntraVENous, CONTINUOUS, Polliard, Oneda Kick T, NP, Last Rate: 3 mL/hr at 04/20/22 0513, 3 mL/hr at 04/20/22 0513    acetaminophen (TYLENOL) tablet 650 mg, 650 mg, Oral, Q6H PRN, Pernell Wong NP, 650 mg at 05/01/22 0739    naloxone (NARCAN) injection 0.4 mg, 0.4 mg, IntraVENous, PRN, Pernell Wong NP    ondansetron [facility]) injection 4 mg, 4 mg, IntraVENous, Q4H PRN, Diana Wong NP, 4 mg at 04/11/22 2139    albuterol-ipratropium (DUO-NEB) 2.5 MG-0.5 MG/3 ML, 3 mL, Nebulization, Q6H PRN, Pernell Wong NP    glucose chewable tablet 16 g, 4 Tablet, Oral, PRN, Ashley Wong NP    glucagon (GLUCAGEN) injection 1 mg, 1 mg, IntraMUSCular, PRN, Ashley Wong NP    insulin lispro (HUMALOG) injection, , SubCUTAneous, AC&HS, Ashley Wong NP, 2 Units at 05/02/22 1226    sucralfate (CARAFATE) tablet 1 g, 1 g, Oral, AC&HS, Ashley Wong NP, 1 g at 05/03/22 0720    0.45% sodium chloride infusion, 10 mL/hr, IntraVENous, CONTINUOUS, Silvina Najera MD, Stopped at 04/10/22 0730    fentaNYL citrate (PF) injection 25 mcg, 25 mcg, IntraVENous, Q2H PRN, Ashley Wong NP, 25 mcg at 05/02/22 2151    PATIENT CARE TEAM:  Patient Care Team:  Mg Causey MD as PCP - General (Internal Medicine)  Mg Causey MD as PCP - [facility]  Rachael Valdez MD as Physician (Cardiology)  Janessa Laureano MD as Physician (Gastroenterology)  Kiah Moreno MD as Physician (Orthopedic Surgery)  Ilda Martin MD as Physician (Ophthalmology)  Tyson Noland MD (Cardiology)  Stefania Ayala MD (Cardiology)     Thank you for allowing us to participate in this patient's care. Trace Branham NP  [facility] [address] [place]  [address] [facility], [address]  Phone: [phone]    [facility] ATTENDING ADDENDUM    Patient was seen and examined in person. Data and notes were reviewed. I have discussed and agree with the plan as noted in the NP note above without further additions.     Teodoro Shone, MD PhD  Sabrina Rodriguez

## 2022-05-03 NOTE — PROGRESS NOTES
1945: Receive report from Kathy, [address], RN. Patient resting in bed at this time without complaints. 2787: NICOM completed and documented in chart. 0730: Preceptor Review of RN Work    5/3/2022  - Shift times - 1930 to 0730    The RN documentation of patient care for [address] [place] [address] has been reviewed and approved. All medications have been administered under the direct supervision of the preceptor.     Cristobal Edmonds RN

## 2022-05-04 ENCOUNTER — APPOINTMENT (OUTPATIENT)
Dept: GENERAL RADIOLOGY | Age: 77
DRG: 001 | End: 2022-05-04
Attending: NURSE PRACTITIONER
Payer: MEDICARE

## 2022-05-04 ENCOUNTER — APPOINTMENT (OUTPATIENT)
Dept: NON INVASIVE DIAGNOSTICS | Age: 77
DRG: 001 | End: 2022-05-04
Attending: NURSE PRACTITIONER
Payer: MEDICARE

## 2022-05-04 LAB
ALBUMIN SERPL-MCNC: 3 G/DL (ref 3.5–5)
ALBUMIN/GLOB SERPL: 0.9 {RATIO} (ref 1.1–2.2)
ALP SERPL-CCNC: 152 U/L (ref 45–117)
ALT SERPL-CCNC: 22 U/L (ref 12–78)
ANION GAP SERPL CALC-SCNC: 7 MMOL/L (ref 5–15)
AST SERPL-CCNC: 66 U/L (ref 15–37)
BILIRUB SERPL-MCNC: 1.1 MG/DL (ref 0.2–1)
BNP SERPL-MCNC: 2071 PG/ML
BUN SERPL-MCNC: 20 MG/DL (ref 6–20)
BUN/CREAT SERPL: 22 (ref 12–20)
CALCIUM SERPL-MCNC: 8.4 MG/DL (ref 8.5–10.1)
CHLORIDE SERPL-SCNC: 99 MMOL/L (ref 97–108)
CO2 SERPL-SCNC: 28 MMOL/L (ref 21–32)
CREAT SERPL-MCNC: 0.92 MG/DL (ref 0.7–1.3)
ECHO AO ROOT DIAM: 3.9 CM
ECHO AO ROOT INDEX: 1.93 CM/M2
ECHO EST RA PRESSURE: 3 MMHG
ECHO LV FRACTIONAL SHORTENING: 17 % (ref 28–44)
ECHO LV INTERNAL DIMENSION DIASTOLE INDEX: 2.62 CM/M2
ECHO LV INTERNAL DIMENSION DIASTOLIC: 5.3 CM (ref 4.2–5.9)
ECHO LV INTERNAL DIMENSION SYSTOLIC INDEX: 2.18 CM/M2
ECHO LV INTERNAL DIMENSION SYSTOLIC: 4.4 CM
ECHO LV IVSD: 0.9 CM (ref 0.6–1)
ECHO LV MASS 2D: 213.9 G (ref 88–224)
ECHO LV MASS INDEX 2D: 105.9 G/M2 (ref 49–115)
ECHO LV POSTERIOR WALL DIASTOLIC: 1.2 CM (ref 0.6–1)
ECHO LV RELATIVE WALL THICKNESS RATIO: 0.45
ECHO RIGHT VENTRICULAR SYSTOLIC PRESSURE (RVSP): 30 MMHG
ECHO RV FREE WALL PEAK S': 3 CM/S
ECHO RV TAPSE: 0.9 CM (ref 1.7–?)
ECHO TV REGURGITANT MAX VELOCITY: 2.58 M/S
ECHO TV REGURGITANT PEAK GRADIENT: 27 MMHG
ERYTHROCYTE [DISTWIDTH] IN BLOOD BY AUTOMATED COUNT: 18.2 % (ref 11.5–14.5)
GLOBULIN SER CALC-MCNC: 3.3 G/DL (ref 2–4)
GLUCOSE BLD STRIP.AUTO-MCNC: 117 MG/DL (ref 65–117)
GLUCOSE BLD STRIP.AUTO-MCNC: 130 MG/DL (ref 65–117)
GLUCOSE BLD STRIP.AUTO-MCNC: 166 MG/DL (ref 65–117)
GLUCOSE BLD STRIP.AUTO-MCNC: 218 MG/DL (ref 65–117)
GLUCOSE SERPL-MCNC: 166 MG/DL (ref 65–100)
HCT VFR BLD AUTO: 26.4 % (ref 36.6–50.3)
HGB BLD-MCNC: 8.2 G/DL (ref 12.1–17)
INR PPP: 2.6 (ref 0.9–1.1)
LDH SERPL L TO P-CCNC: 170 U/L (ref 85–241)
MAGNESIUM SERPL-MCNC: 2.2 MG/DL (ref 1.6–2.4)
MCH RBC QN AUTO: 25.7 PG (ref 26–34)
MCHC RBC AUTO-ENTMCNC: 31.1 G/DL (ref 30–36.5)
MCV RBC AUTO: 82.8 FL (ref 80–99)
NRBC # BLD: 0 K/UL (ref 0–0.01)
NRBC BLD-RTO: 0 PER 100 WBC
PLATELET # BLD AUTO: 293 K/UL (ref 150–400)
PMV BLD AUTO: 9.7 FL (ref 8.9–12.9)
POTASSIUM SERPL-SCNC: 3.8 MMOL/L (ref 3.5–5.1)
PROT SERPL-MCNC: 6.3 G/DL (ref 6.4–8.2)
PROTHROMBIN TIME: 25.4 SEC (ref 9–11.1)
RBC # BLD AUTO: 3.19 M/UL (ref 4.1–5.7)
SERVICE CMNT-IMP: ABNORMAL
SERVICE CMNT-IMP: NORMAL
SODIUM SERPL-SCNC: 134 MMOL/L (ref 136–145)
WBC # BLD AUTO: 6.5 K/UL (ref 4.1–11.1)

## 2022-05-04 PROCEDURE — 83735 ASSAY OF MAGNESIUM: CPT

## 2022-05-04 PROCEDURE — 74011250637 HC RX REV CODE- 250/637: Performed by: NURSE PRACTITIONER

## 2022-05-04 PROCEDURE — 74011250637 HC RX REV CODE- 250/637: Performed by: INTERNAL MEDICINE

## 2022-05-04 PROCEDURE — 97116 GAIT TRAINING THERAPY: CPT

## 2022-05-04 PROCEDURE — APPSS60 APP SPLIT SHARED TIME 46-60 MINUTES: Performed by: NURSE PRACTITIONER

## 2022-05-04 PROCEDURE — 99233 SBSQ HOSP IP/OBS HIGH 50: CPT | Performed by: INTERNAL MEDICINE

## 2022-05-04 PROCEDURE — 82962 GLUCOSE BLOOD TEST: CPT

## 2022-05-04 PROCEDURE — 74011000250 HC RX REV CODE- 250: Performed by: NURSE PRACTITIONER

## 2022-05-04 PROCEDURE — 93308 TTE F-UP OR LMTD: CPT

## 2022-05-04 PROCEDURE — 83615 LACTATE (LD) (LDH) ENZYME: CPT

## 2022-05-04 PROCEDURE — 2709999900 HC NON-CHARGEABLE SUPPLY

## 2022-05-04 PROCEDURE — 97530 THERAPEUTIC ACTIVITIES: CPT

## 2022-05-04 PROCEDURE — 36415 COLL VENOUS BLD VENIPUNCTURE: CPT

## 2022-05-04 PROCEDURE — 77030037442 HC TY LVAD MGMT SYST CMP-B

## 2022-05-04 PROCEDURE — P9047 ALBUMIN (HUMAN), 25%, 50ML: HCPCS | Performed by: NURSE PRACTITIONER

## 2022-05-04 PROCEDURE — 93750 INTERROGATION VAD IN PERSON: CPT | Performed by: INTERNAL MEDICINE

## 2022-05-04 PROCEDURE — 80053 COMPREHEN METABOLIC PANEL: CPT

## 2022-05-04 PROCEDURE — 85027 COMPLETE CBC AUTOMATED: CPT

## 2022-05-04 PROCEDURE — 74011636637 HC RX REV CODE- 636/637: Performed by: NURSE PRACTITIONER

## 2022-05-04 PROCEDURE — 85610 PROTHROMBIN TIME: CPT

## 2022-05-04 PROCEDURE — 74011250636 HC RX REV CODE- 250/636: Performed by: NURSE PRACTITIONER

## 2022-05-04 PROCEDURE — 71045 X-RAY EXAM CHEST 1 VIEW: CPT

## 2022-05-04 PROCEDURE — 65660000001 HC RM ICU INTERMED STEPDOWN

## 2022-05-04 PROCEDURE — 93750 INTERROGATION VAD IN PERSON: CPT | Performed by: NURSE PRACTITIONER

## 2022-05-04 PROCEDURE — 83880 ASSAY OF NATRIURETIC PEPTIDE: CPT

## 2022-05-04 PROCEDURE — 97535 SELF CARE MNGMENT TRAINING: CPT

## 2022-05-04 RX ORDER — WARFARIN SODIUM 5 MG/1
5 TABLET ORAL ONCE
Status: COMPLETED | OUTPATIENT
Start: 2022-05-04 | End: 2022-05-04

## 2022-05-04 RX ORDER — DOBUTAMINE HYDROCHLORIDE 400 MG/100ML
2.5 INJECTION INTRAVENOUS CONTINUOUS
Status: DISCONTINUED | OUTPATIENT
Start: 2022-05-04 | End: 2022-05-04

## 2022-05-04 RX ADMIN — HYDRALAZINE HYDROCHLORIDE 10 MG: 20 INJECTION INTRAMUSCULAR; INTRAVENOUS at 00:04

## 2022-05-04 RX ADMIN — HYDRALAZINE HYDROCHLORIDE 50 MG: 50 TABLET, FILM COATED ORAL at 11:42

## 2022-05-04 RX ADMIN — MIRTAZAPINE 7.5 MG: 15 TABLET, FILM COATED ORAL at 23:56

## 2022-05-04 RX ADMIN — ALBUMIN (HUMAN) 12.5 G: 0.25 INJECTION, SOLUTION INTRAVENOUS at 17:59

## 2022-05-04 RX ADMIN — AMIODARONE HYDROCHLORIDE 200 MG: 200 TABLET ORAL at 08:33

## 2022-05-04 RX ADMIN — DOCUSATE SODIUM 100 MG: 100 CAPSULE, LIQUID FILLED ORAL at 08:34

## 2022-05-04 RX ADMIN — SODIUM CHLORIDE, PRESERVATIVE FREE 10 ML: 5 INJECTION INTRAVENOUS at 05:19

## 2022-05-04 RX ADMIN — COLCHICINE 0.6 MG: 0.6 TABLET, FILM COATED ORAL at 08:34

## 2022-05-04 RX ADMIN — FAMOTIDINE 20 MG: 20 TABLET ORAL at 21:18

## 2022-05-04 RX ADMIN — FAMOTIDINE 20 MG: 20 TABLET ORAL at 08:33

## 2022-05-04 RX ADMIN — SUCRALFATE 1 G: 1 TABLET ORAL at 21:18

## 2022-05-04 RX ADMIN — SACUBITRIL AND VALSARTAN 1 TABLET: 49; 51 TABLET, FILM COATED ORAL at 23:56

## 2022-05-04 RX ADMIN — Medication 2 UNITS: at 11:48

## 2022-05-04 RX ADMIN — WARFARIN SODIUM 5 MG: 5 TABLET ORAL at 18:00

## 2022-05-04 RX ADMIN — BUMETANIDE 2 MG: 1 TABLET ORAL at 15:11

## 2022-05-04 RX ADMIN — SUCRALFATE 1 G: 1 TABLET ORAL at 18:02

## 2022-05-04 RX ADMIN — ASPIRIN 81 MG CHEWABLE TABLET 81 MG: 81 TABLET CHEWABLE at 08:34

## 2022-05-04 RX ADMIN — SACUBITRIL AND VALSARTAN 1 TABLET: 49; 51 TABLET, FILM COATED ORAL at 08:34

## 2022-05-04 RX ADMIN — HYDRALAZINE HYDROCHLORIDE 50 MG: 50 TABLET, FILM COATED ORAL at 21:18

## 2022-05-04 RX ADMIN — EPLERENONE 25 MG: 25 TABLET, FILM COATED ORAL at 08:33

## 2022-05-04 RX ADMIN — BUMETANIDE 2 MG: 1 TABLET ORAL at 08:34

## 2022-05-04 RX ADMIN — ALBUMIN (HUMAN) 12.5 G: 0.25 INJECTION, SOLUTION INTRAVENOUS at 08:33

## 2022-05-04 RX ADMIN — Medication 400 MG: at 08:33

## 2022-05-04 RX ADMIN — ACETAMINOPHEN 650 MG: 325 TABLET ORAL at 08:34

## 2022-05-04 RX ADMIN — SUCRALFATE 1 G: 1 TABLET ORAL at 11:42

## 2022-05-04 RX ADMIN — SUCRALFATE 1 G: 1 TABLET ORAL at 07:16

## 2022-05-04 RX ADMIN — SODIUM CHLORIDE, PRESERVATIVE FREE 10 ML: 5 INJECTION INTRAVENOUS at 21:18

## 2022-05-04 RX ADMIN — HYDRALAZINE HYDROCHLORIDE 50 MG: 50 TABLET, FILM COATED ORAL at 05:24

## 2022-05-04 NOTE — PROGRESS NOTES
Problem: Self Care Deficits Care Plan (Adult)  Goal: *Acute Goals and Plan of Care (Insert Text)  Description: FUNCTIONAL STATUS PRIOR TO ADMISSION: pt lives with wife, independent prior    HOME SUPPORT: The patient lived with wife but did not require assist.    Occupational Therapy Goals  Goals reviewed and upgraded as follows 4/25/2022  1. Patient will perform ADLs standing 5 mins without fatigue or LOB with modified independence within 7 day(s). upgraded  2. Patient will perform lower body ADLs with modified independence using legs crossed method within 7 day(s). upgraded  3. Patient will perform upper body ADLs with modified independence within 7 day(s). upgraded  4. Patient will perform toilet transfers with modified independence within 7 day(s). upgraded  5. Patient will perform all aspects of toileting with modified independence within 7 day(s). upgraded  6. Patient will participate in cardiac/sternal upper extremity therapeutic exercise/activities to increase independence with ADLs with supervision/set-up for 5 minutes within 7 day(s). continue  7. Patient will perform VAD switchover routine as part of ADL routine (including batteries<>batteries, battery clip<>battery clip, mock SC<>SC) with stand by/set-up assistance within 7 days. upgraded     Goals reviewed and remain appropriate 4/18/2022  Initiated 4/8/2022  1. Patient will perform ADLs standing 5 mins without fatigue or LOB with supervision/set-up within 7 day(s). 2.  Patient will perform lower body ADLs with minimal assistance/contact guard assist within 7 day(s). 3.  Patient will perform upper body ADLs with minimal assistance/contact guard assist within 7 day(s). 4.  Patient will perform toilet transfers with minimal assistance/contact guard assist within 7 day(s). 5.  Patient will perform all aspects of toileting with minimal assistance/contact guard assist within 7 day(s).   6.  Patient will participate in cardiac/sternal upper extremity therapeutic exercise/activities to increase independence with ADLs with supervision/set-up for 5 minutes within 7 day(s). 7.  Patient will perform VAD switchover routine as part of ADL routine (including batteries<>batteries, battery clip<>battery clip, mock SC<>SC) with minimal assistance/contact guard assist within 7 days. 5/4/2022 1558 by Wisam Jean OT  Outcome: Progressing Towards Goal   OCCUPATIONAL THERAPY TREATMENT  Patient: [address] (16 y.o. male)  Date: 5/4/2022  Diagnosis: HFrEF (heart failure with reduced ejection fraction) ([facility] [address] [I50.20] <principal problem not specified>  Procedure(s) (LRB):  REDO STERNOTOMY; RIGHT GROIN CUTDOWN FOR CANNULATION;  INSERTION OF LEFT VENTRICULAR ASSIST DEVICE (HMIII); AORTIC VALVE CLOSURE CHEL BY DR. Debbie Landa. (N/A) 29 Days Post-Op  Precautions: Fall (mindful movement, LVAD )  Chart, occupational therapy assessment, plan of care, and goals were reviewed. ASSESSMENT  Patient continues with skilled OT services and is progressing towards goals. Patent amenable to session, discussed ADL/environmental modifications to maximize ADL independence/safety at home while maintaining precautions, patient verbalized understanding. Completed simple grooming tasks in standing with overall supervision. Patient with good understanding of precautions and LVAD switchovers and components. Patient left as received, all needs in reach, NAD. Patient is verbalizing and is demonstrating understanding of mindful-based movements (\"move in the tube\") principles of keeping UEs proximal to ribcage to prevent lateral pull on the sternum during load-bearing activities with verbal cues required for compliance. Current Level of Function Impacting Discharge (ADLs): up to supervision    Other factors to consider for discharge: new sternotomy, LVAd         PLAN :  Patient continues to benefit from skilled intervention to address the above impairments.   Continue treatment per established plan of care to address goals. Recommend with staff: OOB 3x daily for meals, functional mobility to bathroom    Recommend next OT session: OOB ADL    Recommendation for discharge: (in order for the patient to meet his/her long term goals)  Occupational therapy at least 2 days/week in the home     This discharge recommendation:  Has been made in collaboration with the attending provider and/or case management    IF patient discharges home will need the following DME: none       SUBJECTIVE:   Patient stated i'm doing alright.     OBJECTIVE DATA SUMMARY:   Cognitive/Behavioral Status:  Neurologic State: Alert  Orientation Level: Appropriate for age  Cognition: Appropriate decision making; Appropriate for age attention/concentration; Appropriate safety awareness             Functional Mobility and Transfers for ADLs:  Bed Mobility:  Supine to Sit: Modified independent; Additional time    Transfers:  Sit to Stand: Supervision          Balance:  Sitting: Intact  Standing: Impaired  Standing - Static: Good  Standing - Dynamic : Good    ADL Intervention:       Grooming  Position Performed: Standing  Washing Face: Supervision  Washing Hands: Set-up                   Lower Body Dressing Assistance  Socks: Independent  Position Performed: Seated in chair              Therapeutic Exercises:   Patient instructed on the benefits and demonstrated cardiac exercises while standing with Contact guard assistance. Instructed and indicated understanding on how to progress reps, sets against gravity, pacing through progressive muscle strengthening standing based on surgeon clearance for more weight in prep for basic and instrumental ADLs. Instruction on the use of household items in place of weights as needed.     CARDIAC   EXERCISE    Sets    Reps    Active  Active Assist    Passive  Self ROM    Comments    Shoulder flexion  1  5   [x]                            []                             [] []                                Shoulder abduction  1  5  [x]                             []                             []                             []                                Scapular elevation  1  5  [x]                             []                              []                             []                                Scapular retraction  1  5  [x]                             []                             []                             []                                Trunk rotation  1  5  [x]                             []                             []                             []                                Trunk sidebending  1  5  [x]                             []                              []                             []                                          Pain:  None noted    Activity Tolerance:   Good and requires rest breaks    After treatment patient left in no apparent distress:   Sitting in chair    COMMUNICATION/COLLABORATION:   The patients plan of care was discussed with: Physical therapist and Registered nurse.      Maya Velazquez OT  Time Calculation: 29 mins

## 2022-05-04 NOTE — PROGRESS NOTES
NIC[facility] Hemodynamic Monitoring       Visit Vitals  /88   Pulse 74   Temp 99 °F (37.2 °C)   Resp 18   Ht 6' 1\" (1.854 m)   Wt 78.2 kg (172 lb 6.4 oz)   SpO2 96%   BMI 22.75 kg/m²         Baseline assessment:        CO 5.1        CI 2.7        SVI 37        SVV 19        TPR 1328         Orthostatics MAPs  Lyin  Sittin  Standin

## 2022-05-04 NOTE — PROGRESS NOTES
Problem: Mobility Impaired (Adult and Pediatric)  Goal: *Acute Goals and Plan of Care (Insert Text)  Description: FUNCTIONAL STATUS PRIOR TO ADMISSION: Patient was independent and active without use of DME.    HOME SUPPORT PRIOR TO ADMISSION: The patient lived with his wife but did not require assist.    Physical Therapy Goals- Continue goals with exception of #7 for next 7 days 4/22/2022, continue all goals 4/29/2022    Weekly Reassessment 4/15/2022 (goals progressed)  1. Patient will move from supine to sit and sit to supine , scoot up and down, and roll side to side in bed with head of bed flat with supervision/set-up within 7 days. 2.  Patient will perform sit to/from stand from chair height with supervision/set-up within 7 days. 3.  Patient will ambulate 300 feet stand by assist within 7 days. 4.  Patient will ascend/descend 6 stairs with handrail(s) with minimal assistance/contact guard assist within 14 days. 5.  Patient will perform cardiac exercises per protocol with supervision/set-up within 7 days. 6.  Patient will verbally and functionally recall mindful movement precautions within 7 days. 7.  Patient will perform power exchange for power module to/from battery with stand by assist within 7 days. -MET currently independent 4/22/2022    Initiated 4/8/2022  1. Patient will move from supine to sit and sit to supine , scoot up and down, and roll side to side in bed with supervision/set-up within 7 days. 2.  Patient will perform sit to/from stand with supervision/set-up within 7 days. 3.  Patient will ambulate 50 feet with least restrictive assistive device and minimal assistance/contact guard assist within 7 days. 4.  Patient will ascend/descend 6 stairs with handrail(s) with minimal assistance/contact guard assist within 14 days. 5.  Patient will perform cardiac exercises per protocol with supervision/set-up within 7 days.   6.  Patient will verbally and functionally recall 3/3 sternal precautions within 7 days. 7.  Patient will perform power exchange for power module to/from battery with minimal assistance within 7 days. Outcome: Progressing Towards Goal      PHYSICAL THERAPY TREATMENT  Patient: Neha Barton (77 y.o. male)  Date: 5/4/2022  Diagnosis: HFrEF (heart failure with reduced ejection fraction) ([facility] [I50.20] <principal problem not specified>  Procedure(s) (LRB):  REDO STERNOTOMY; RIGHT GROIN CUTDOWN FOR CANNULATION;  INSERTION OF LEFT VENTRICULAR ASSIST DEVICE (HMIII); AORTIC VALVE CLOSURE CHEL BY DR. Ankita Rios. (N/A) 29 Days Post-Op  Precautions: Fall (mindful movement, LVAD )  Chart, physical therapy assessment, plan of care and goals were reviewed. ASSESSMENT  Patient continues with skilled PT services and is progressing towards goals. Pt tolerated 6MWT. Pt had good change over good understanding of task. Pt was able to ambulate and take standing rest break as needed. Pt looking forward to better sleep and food at discharge. Pt concerns about being able to get around safely. 6 MWT results: (Specify if any supplemental oxygen is used, the type, pre, during and post sats.)  Distance Walked in Feet (ft): 628 ft. Pre Heart Rate: 82           Post Heart Rate: 91         Normative data:   Men 39-80 years old = 1889 feet; Women 3680 years zio=4377 feet              Current Level of Function Impacting Discharge (mobility/balance): SBA    Other factors to consider for discharge: LVAD         PLAN :  Patient continues to benefit from skilled intervention to address the above impairments. Continue treatment per established plan of care. to address goals.     Recommendation for discharge: (in order for the patient to meet his/her long term goals)  Physical therapy at least 2 days/week in the home AND ensure assist and/or supervision for safety with community mobility     This discharge recommendation:  Has not yet been discussed the attending provider and/or case management    IF patient discharges home will need the following DME: none       SUBJECTIVE:   Patient stated I am tired but could have gone more.     OBJECTIVE DATA SUMMARY:       Critical Behavior:  Neurologic State: Alert  Orientation Level: Appropriate for age  Cognition: Appropriate decision making,Appropriate for age attention/concentration,Appropriate safety awareness     Functional Mobility Training:  Bed Mobility:     Supine to Sit: Modified independent; Additional time              Transfers:  Sit to Stand: Supervision  Stand to Sit: Supervision                             Balance:  Sitting: Intact  Standing: Impaired  Standing - Static: Good  Standing - Dynamic : Good  Ambulation/Gait Training:  Distance (ft): 628 Feet (ft)     Ambulation - Level of Assistance: Stand-by assistance        Gait Abnormalities: Decreased step clearance        Base of Support: Center of gravity altered     Speed/Charlene: Pace decreased (<100 feet/min)  Step Length: Left shortened;Right shortened                    Stairs:              VAD power transition  Patient performed switchover from power module to battery. Completed the following tasks:   Independent Verbal cues Physical assist Comments   Don holster vest x   Pt used fishing vest    Check batteries x      Check        Ensure  clipped onto stabilization belt x      Position batteries in clips x      Disconnect power leads x      Insert power lead into battery x      [place] batteries in vest pocket x      Secure batteries with velcro and clips x        Patient performed switchover from battery to power module.   Completed the following tasks:   Independent Verbal cues Physical assistance Comments   Remove batteries from holster       Disconnect power leads from battery       Reconnect power leads into power module       Remove batteries from clips       Doff holster vest         Therapeutic Exercises:       Pain Rating:  No complaints Activity Tolerance:   requires rest breaks      After treatment patient left in no apparent distress:   Sitting in chair, Call bell within reach, and Bed / chair alarm activated    COMMUNICATION/COLLABORATION:   The patients plan of care was discussed with: Registered nurse.      Lino Slater PTA   Time Calculation: 26 mins

## 2022-05-04 NOTE — PROGRESS NOTES
[facility] in [place], [place]  Inpatient Progress Note      Patient name: Marilu Streeter  Patient : 1945  Patient MRN: [id number]  Consulting MD: Marcela Beck MD  Date of service: 22      REASON FOR REFERRAL:  Management of chronic systolic heart failure, LVAD     PLAN OF CARE:  · 69 y.o. male with severe ischemic cardiomyopathy, LVEF 15-20% s/p HM3 LVAD implant as DT on 22 by Dr. Domonique Tamayo c/b intraoperative bleeding requiring multiple blood products and subsequent RV failure with severe TR on high dose dobutamine IV  · Pt reports that he \"does not want to go home attached to anything other than the LVAD\"; is not interested in [address] catheter or IV dobutamine at home- will work towards weaning dobut if able  · Plan for remainder of hospitalization includes:  ? Surveillance weekly echos to determine if RV function improves and TV coapts  ? PT/OT/Nutrition  ? Optimization of GDMT       RECOMMENDATIONS:  POD 27  TTE  showed severe TR, severe RV dysfunction; continue to optimize medically   Continue speed 4800rpms, low speed 4400  Stop Dobutamine   Continue hydralazine 50 mg PO q8h   Continue amiodarone 200 mg PO daily x 2 weeks   Continue Entresto 49/51mg BID  Cont Eplerenone 25mg daily  Continue Bumex 2 mg PO BID    Completed course of cefepime for sternal incision  Driveline dressing changes daily for now   Needs EKG weekly   Continue warfarin, goal INR 2-3; 5 mg tonight   ASA 81mg daily  Continue colchicine for pericarditis- will stop on discharge   D/C'd gabapentin- pt reports tremors with this and does not want to take it.    IV fentanyl prn (pt with hx anaphylaxis to oxycodone)  PRN toradol  Continue bowel regimen   CPAP qHS  Advanced care plan forms on file   Strict I/O, daily weights, Na+ restricted diet   Continue VAD education   Will ask Sleep medicine to help troubleshoot mask fitting   Interrogate AICD today  Check Orthostatics  Check Nicom   Equipment ordered  6 Min walk today   Tentative D/C date Friday       INTERVAL HISTORY:  Dobutamine weaned off  Creatinine stable  PBNP stable  Hgb stable  No acute overnight events    IMPRESSION:  POD 27 S/p LVAD implant as DT  · 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  · Persistent large volume chest tube output  Chronic systolic heart failure  · Stage D, NYHA class IV symptoms  · Non-ischemic cardiomyopathy, LVEF 20% with LVEDD 6.2  · RV dysfunction, TAPSE 1.22 (prelimnary read)  · TBili 1.5 likely from cardiac congestion  At risk of sudden cardiac death  · Recent cardiac arrest   · S/p ICD (1/2013, [place] followed by [address] [place] [address]); New implant on 10/21/2021 [employer]  Coronary artery disease  · S/p multiple interventions  · S/p 4V CABG (8/2012)  · LHC (7/2019) severe stenosis of LIMA to LAD anastomosis site, SVG graft to OM is occluded, SVG graft to RCA 40-50%, LAD occluded proximally, severe proximal LCx, RCA is the long . · PET (6/2019) EF 24% with anterior lateral and inferior reversible defect  Cardiac risk factors  · HTN  · HL  · DM2  · SRUTHI on CPAP  · MIld carotid stenosis  Anemia, microcytic  · Iron deficiency  DVT with filter  Pulmonary nodules   Dysphagia       LIFE GOALS:  Patient's personal goals include: getting stronger and going home soon  Important upcoming milestones or family events: The patient identifies the following as important for living well: being independent, being at home, enjoying family time        [facility]:  Echo 5/4/22- ordered  Echo 4/26/22 - LVIDd 4.9 cm, TAPSE 0.5 cm,   Echo 4/18/22- LVEF 15-20%, severe TR, TAPSE 0.5cm, RVIDd 6cm   Echo (4/8/22)    Left Ventricle: Left ventricle size is normal. Moderately increased wall thickness. Findings consistent with concentric remodeling. Severe global hypokinesis present. Severely reduced left ventricular systolic function with a visually estimated EF of 10 -15%. 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   Aortic Valve: Moderate sclerosis of the aortic valve cusp.   Mitral Valve: Moderately thickened leaflet. Mild transvalvular regurgitation.   Tricuspid Valve: Severe transvalvular regurgitation.   Right Atrium: Right atrium is severely dilated.     Echo (3/2/22)   · LV 10-15%  · Mod-severe MR      Echo (11/23/21):  · LV 15-20%.    · Mod-severe, MR, mod-TR     Echo (5/20/21)  · LV: Estimated LVEF is 20 - 25%. Normal wall thickness. Mildly dilated left ventricle. Severely and globally reduced systolic function. Severe (grade 4) left ventricular diastolic dysfunction. · LA: Severely dilated left atrium. · RA: Severely dilated right atrium. · MV: Moderate mitral valve regurgitation is present. · TV: Moderate tricuspid valve regurgitation is present. · AO: Mild aortic root dilatation. · RV: Pacer/ICD present. · LVED 6.2cm     EKG (5/20/21) SB with 1degree AV block, QRS 116ms     LHC (5/24/21) Native Coronaries: LM - moderate to severe distal disease 50%. % prox occluded (), heavily calcified, LCx: 80% proximal stenosis. OM1 is  (seen filling faintly via collaterals). OM2 99% stenosis. RCA: 100% proximal occluded.  LIMA to LAD: patent, supplies only a diagonal branch (probably D2). The LAD distal to the bifurcation is 100% occluded () and fills via right to left collaterals off the SVG to RCA. SVG to R-PDA: patent with moderate diffuse irregularities but no obstructive disease in the graft.    No appealing interventional targets. 6 Min Walk Report 5/2/2022 11/12/2021 7/6/2021 5/20/2021   (PRE) HR 82 88 98 74   (PRE) O2 Sat 98 100 - 99   (POST) HR 88 102 - 81   (POST) O2 Sat 98 99 98% on Ra 99   Distance in Meters 181.36 386. 62 - 1158.24         BRIEF HISTORY OF PRESENT ILLNESS:  Aimee Mackenzie is a 69 y.o. male with h/o HTN, HL, DM2, SRUTHI on CPAP, chronic systolic heart failure, stage D, NYHA class IV symptoms, non-ischemic cardiomyopathy, LVEF 20% with LVEDD 6.2, RV dysfunciton, TAPSE 1.22, TBili 1.5 likely from cardiac congestion, recent cardiac arrest s/p ICD (1/2013, [employer] followed by [facility]), coronary artery disease s/p multiple interventions s/p 4V CABG (8/2012), LHC (7/2019) severe stenosis of LIMA to LAD anastomosis site, SVG graft to OM is occluded, SVG graft to RCA 40-50%, LAD occluded proximally, severe proximal LCx, RCA is the long . PET (6/2019) EF 24% with anterior lateral and inferior reversible defect. Anemia, microcytic with iron deficiency, DVT with filter.     Patient was referred to [facility] by Dr. Pattie Aparicio in 2021 for evaluation of his candidacy for advanced therapies.     Patient agreed to inpatient initiation of palliative infusion of chronic inotropes and evaluation for LVAD-DT. Patient was admitted 5/2-5/25/21. Patient was started on milrinone infusion and had first part of LVAD evaluation completed. Right and left heart cath on 5/24/21 showed severe diffuse native vessel CAD, with patent grafts (LIMA to D2, SVG to RCA).  Pt was found to have pulmonary nodule during workup. Antiplatelet therapy was held during hospitalization and after discharge due to anticipated pulmonary nodule biopsy, bone marrow biopsy and EGD/C-Scope as part of LVAD workup. Pt found to have adenocarcinoma of the lung, and so LVAD was deferred pending treatment. Patient completed radiation treatment for adenocarcinoma of the lung. Hem-onc cleared patient for VAD implant with 90% 2 year prognosis.      He was most recently admitted for elective pre-op EGD and colonoscopy which he tolerated well; path negative for malignancy.      He presented to [facility] on 4/3 for admission for planned LVAD implant. Underwent LVAD implant on 4/5/22. Was re-do sternotomy, bleeding intra-op, required cryo, k-centra, FFP, Plt, and cellsaver in OR.   Had RV dysfunction noted intra-op; requiring lower VAD speed and dual inotrope support. Inotropic support was able to be decreased, but pt now remains on dobutamine at 6mcg/kg/min.      Pt ans wife have been doing well with LVAD education. Pt has been OOB, is eating well and moving his bowels. Therapeutic on warfarin. REVIEW OF SYSTEMS:  Review of Systems   Constitutional: Positive for malaise/fatigue. Negative for chills, fever and weight loss. Respiratory: Negative for cough and shortness of breath. Cardiovascular: Negative for chest pain, palpitations, orthopnea and leg swelling. Gastrointestinal: Negative for abdominal pain, constipation, diarrhea, heartburn, nausea and vomiting. Neurological: Positive for tremors. Negative for dizziness and weakness. Psychiatric/Behavioral: Negative for depression. PHYSICAL EXAM:  Visit Vitals  /88   Pulse 75   Temp 99.6 °F (37.6 °C)   Resp 18   Ht 6' 1\" (1.854 m)   Wt 172 lb 6.4 oz (78.2 kg)   SpO2 96%   BMI 22.75 kg/m²       LVAD INTERROGATION:  Device interrogated in person  Device function normal, normal flow, no events  LVAD   Pump Speed (RPM): 4800  Pump Flow (LPM): 4  MAP: 90  PI (Pulsitility Index): 4.1  Power: 3.1   Test: No  Back Up  at Bedside & Labeled: Yes  Power Module Test: No  Driveline Site Care: No  Driveline Dressing: Clean, Dry, and Intact  MAP in Therapeutic Range (Outpatient): Yes  Testing  Alarms Reviewed: No  Back up SC speed: 4800  Back up Low Speed Limit: 4400  Emergency Equipment with Patient?: Yes  Emergency procedures reviewed?: No  Drive line site inspected?: Yes  Drive line intergrity inspected?: Yes  Drive line dressing changed?: Yes      Physical Exam  Vitals and nursing note reviewed. Constitutional:       General: He is not in acute distress. Appearance: Normal appearance. Cardiovascular:      Rate and Rhythm: Normal rate and regular rhythm.       Comments: LVAD Hum noted on auscultation  Pulmonary:      Effort: Pulmonary effort is normal. No respiratory distress. Abdominal:      General: Bowel sounds are normal. There is no distension. Palpations: Abdomen is soft. Tenderness: There is no abdominal tenderness. Musculoskeletal:      Right lower leg: No edema. Left lower leg: No edema. Skin:     General: Skin is warm and dry. Capillary Refill: Capillary refill takes less than 2 seconds. Neurological:      General: No focal deficit present. Mental Status: He is alert and oriented to person, place, and time. Psychiatric:         Mood and Affect: Mood normal.         Behavior: Behavior normal.         Thought Content: Thought content normal.         Judgment: Judgment normal.              PAST MEDICAL HISTORY:  Past Medical History:   Diagnosis Date    CAD (coronary artery disease) '90  '97, '13 x 2    MI, code ice in 2013 [facility]    Calculus of kidney     Colonic polyps     Congestive heart failure, unspecified     Diabetes ([facility] [address]     Gastritis     Hypercholesterolemia     Sleep apnea        PAST SURGICAL HISTORY:  Past Surgical History:   Procedure Laterality Date    COLONOSCOPY  04/06/2011    16, due 21    COLONOSCOPY N/A 3/2/2022    COLONOSCOPY    :- performed by Poonam Herron [facility] at [facility] ENDOSCOPY    ENDOSCOPY, COLON, DIAGNOSTIC      11, due 16    HX CORONARY ARTERY BYPASS GRAFT  8/22/12    x 4 vessel by MISSY Ramirez    HX HEENT      LASIK    HX PACEMAKER PLACEMENT  1/30/13    ME CARDIAC SURG PROCEDURE UNLIST  2012    x 4 vessels       FAMILY HISTORY:  Family History   Problem Relation Age of Onset    Heart Disease Mother         MI    Heart Disease Father         CAD & PVD    Lung Disease Father     Cancer Father         lung    Diabetes Maternal Grandmother     Heart Disease Other         CAD    Stroke Sister        SOCIAL HISTORY:  Social History     Socioeconomic History    Marital status:    Tobacco Use    Smoking status: Never Smoker    Smokeless tobacco: Never Used Vaping Use    Vaping Use: Never used   Substance and Sexual Activity    Alcohol use: Yes     Alcohol/week: 0.0 standard drinks     Comment:  VERY RARE    Drug use: No       LABORATORY RESULTS:     Labs Latest Ref Rng & Units 5/4/2022 5/3/2022 5/2/2022 5/1/2022 4/30/2022 4/29/2022 4/28/2022   WBC 4.1 - 11.1 K/uL 6.5 5.3 5.7 5.5 5.6 5.9 4.8   RBC 4.10 - 5.70 M/uL 3.19(L) 3.19(L) 3.08(L) 2.92(L) 2.95(L) 3.23(L) 2.91(L)   Hemoglobin 12.1 - 17.0 g/dL 8. 2(L) 8. 1(L) 7. 9(L) 7. 6(L) 7. 6(L) 8.4(L) 7. 5(L)   Hematocrit 36.6 - 50.3 % 26. 4(L) 25. 7(L) 25. 8(L) 24. 7(L) 24. 6(L) 26. 6(L) 24. 2(L)   MCV 80.0 - 99.0 FL 82.8 80.6 83.8 84.6 83.4 82.4 83.2   Platelets 816 - 340 K/uL 293 315 298 274 275 299 261   Lymphocytes 12 - 49 % - - - - - - -   Monocytes 5 - 13 % - - - - - - -   Eosinophils 0 - 7 % - - - - - - -   Basophils 0 - 1 % - - - - - - -   Bands 0 - 6 % - - - - - - -   Albumin 3.5 - 5.0 g/dL 3. 0(L) 2. 8(L) 2. 9(L) 2. 6(L) 2. 8(L) 3.0(L) 2. 8(L)   Calcium 8.5 - 10.1 MG/DL 8.4(L) 8.6 8.2(L) 8.0(L) 7. 7(L) 7. 9(L) 8.2(L)   Glucose 65 - 100 mg/dL 166(H) 102(H) 95 199(H) 125(H) 117(H) 93   BUN 6 - 20 MG/DL 20 21(H) 18 17 24(H) 23(H) 26(H)   Creatinine 0.70 - 1.30 MG/DL 0.92 0.86 0.86 0.86 1.01 1.00 1.08   Sodium 136 - 145 mmol/L 134(L) 136 138 133(L) 137 136 134(L)   Potassium 3.5 - 5.1 mmol/L 3.8 3.6 3.6 3.7 4.0 3.9 4.0   TSH 0.36 - 3.74 uIU/mL - - - - - - -   LDH 85 - 241 U/L 170 175 163 177 158 176 188   Some recent data might be hidden     Lab Results   Component Value Date/Time    TSH 2.26 04/10/2022 03:00 AM    TSH 1.68 03/01/2022 11:50 AM    TSH 1.47 05/26/2021 10:44 PM    TSH 1.97 05/20/2021 04:28 PM    TSH 1.41 02/09/2021 03:05 PM    TSH 1.740 08/14/2018 09:58 AM    TSH 1.710 10/18/2017 12:00 AM       ALLERGY:  Allergies   Allergen Reactions    Oxycodone Anaphylaxis    Pcn [Penicillins] Hives        CURRENT MEDICATIONS:    Current Facility-Administered Medications:     warfarin (COUMADIN) tablet 5 mg, 5 mg, Oral, ONCE, Terry, Altamese Halt, NP    sacubitriL-valsartan (ENTRESTO) 49-51 mg tablet 1 Tablet, 1 Tablet, Oral, Q12H, Nekenisha Isaace B, NP, 1 Tablet at 05/04/22 6200    magnesium oxide (MAG-OX) tablet 400 mg, 400 mg, Oral, DAILY, Polliard, Vernon Nicely T, NP, 400 mg at 05/04/22 6754    bumetanide (BUMEX) tablet 2 mg, 2 mg, Oral, BID, Polliard, Vernon Nicely T, NP, 2 mg at 05/04/22 4335    amiodarone (CORDARONE) tablet 200 mg, 200 mg, Oral, DAILY, Polliard, Vernon Nicely T, NP, 200 mg at 05/04/22 4597    sodium chloride (OCEAN) 0.65 % nasal squeeze bottle 2 Spray, 2 Spray, Both Nostrils, Q2H PRN, Terry, Danica B, NP    docusate sodium (COLACE) capsule 100 mg, 100 mg, Oral, DAILY, Terry, Danica B, NP, 100 mg at 05/04/22 0834    eplerenone (INSPRA) tablet 25 mg, 25 mg, Oral, DAILY, Terry, Danica B, NP, 25 mg at 05/04/22 0833    guaiFENesin (ROBITUSSIN) 100 mg/5 mL oral liquid 100 mg, 100 mg, Oral, Q4H PRN, Beverley Addison MD, 100 mg at 05/03/22 2344    polyethylene glycol (MIRALAX) packet 17 g, 17 g, Oral, DAILY, Nelwlamin Isaace B, NP, 17 g at 05/03/22 0831    senna-docusate (PERICOLACE) 8.6-50 mg per tablet 1 Tablet, 1 Tablet, Oral, DAILY PRN, Nekenisha Isaace B, NP, 1 Tablet at 04/24/22 0913    oxymetazoline (AFRIN) 0.05 % nasal spray 2 Spray, 2 Spray, Both Nostrils, BID PRN, Nelwlamin Watkins B, NP, 2 [place] at 04/24/22 1612    hydrALAZINE (APRESOLINE) tablet 50 mg, 50 mg, Oral, Q8H, Millie Wong NP, 50 mg at 05/04/22 0524    colchicine tablet 0.6 mg, 0.6 mg, Oral, DAILY, Vernon Wong NP, 0.6 mg at 05/04/22 0834    alteplase (CATHFLO) 1 mg in sterile water (preservative free) 1 mL injection, 1 mg, InterCATHeter, PRN, Camden Roberts MD, 1 mg at 05/02/22 0041    bisacodyL (DULCOLAX) suppository 10 mg, 10 mg, Rectal, DAILY PRN, Mayra Reardon MD, 10 mg at 04/12/22 1534    albumin human 25% (BUMINATE) solution 12.5 g, 12.5 g, IntraVENous, BID, Vernon Wong NP, 12.5 g at 05/04/22 9450   famotidine (PEPCID) tablet 20 mg, 20 mg, Oral, Q12H, Chito Melton MD, 20 mg at 05/04/22 7495    mirtazapine (REMERON) tablet 7.5 mg, 7.5 mg, Oral, QHS, Terry Danica B, NP, 7.5 mg at 05/03/22 2126    lidocaine 4 % patch 1 Patch, 1 Patch, TransDERmal, Q24H, Zeus Stewartin KAVON, NP, 1 Patch at 04/30/22 1250    Warfarin NP/MD dosing, , Other, PRN, Terry, Danica B, NP    melatonin tablet 3 mg, 3 mg, Oral, QHS PRN, Antonio aZmbrano MD, 3 mg at 04/20/22 2127    hydrALAZINE (APRESOLINE) 20 mg/mL injection 10 mg, 10 mg, IntraVENous, Q6H PRN, Kenroy JACOBSON NP, 10 mg at 05/04/22 0004    aspirin chewable tablet 81 mg, 81 mg, Oral, DAILY, Kenroy JACOBSON NP, 81 mg at 05/04/22 0834    hydrALAZINE (APRESOLINE) 20 mg/mL injection 5 mg, 5 mg, IntraVENous, Q6H PRN, Kenroy JACOBSON NP, 5 mg at 04/13/22 1709    sodium chloride (NS) flush 5-40 mL, 5-40 mL, IntraVENous, Q8H, Millie Wong NP, 10 mL at 05/04/22 0519    sodium chloride (NS) flush 5-40 mL, 5-40 mL, IntraVENous, PRN, Millie Wong NP, 10 mL at 04/24/22 0914    0.9% sodium chloride infusion, 9 mL/hr, IntraVENous, CONTINUOUS, Polliard, Vernia Krabbe T, NP, Last Rate: 3 mL/hr at 04/20/22 0513, 3 mL/hr at 04/20/22 0513    acetaminophen (TYLENOL) tablet 650 mg, 650 mg, Oral, Q6H PRN, Marvin, Graeme Nguyen NP, 650 mg at 05/04/22 0834    naloxone (NARCAN) injection 0.4 mg, 0.4 mg, IntraVENous, PRN, Polliarangeles Jolane Choct[place], NP    ondansetron [facility]) injection 4 mg, 4 mg, IntraVENous, Q4H PRN, Polliard, Vernia Krabbe T, NP, 4 mg at 04/11/22 2139    albuterol-ipratropium (DUO-NEB) 2.5 MG-0.5 MG/3 ML, 3 mL, Nebulization, Q6H PRN, Graeme Wong NP    glucose chewable tablet 16 g, 4 Tablet, Oral, PRN, Graeme Wong NP    glucagon (GLUCAGEN) injection 1 mg, 1 mg, IntraMUSCular, PRN, Graeme Wong NP    insulin lispro (HUMALOG) injection, , SubCUTAneous, AC&HS, Graeme Wong NP, 2 Units at 05/03/22 1149    sucralfate (CARAFATE) tablet 1 g, 1 g, Oral, AC&HS, Noemi Wong NP, 1 g at 05/04/22 0716    0.45% sodium chloride infusion, 10 mL/hr, IntraVENous, CONTINUOUS, Anthony Najera MD, Stopped at 04/10/22 0730    fentaNYL citrate (PF) injection 25 mcg, 25 mcg, IntraVENous, Q2H PRN, Noemi Wong NP, 25 mcg at 05/02/22 2151    PATIENT CARE TEAM:  Patient Care Team:  Angelique Solano MD as PCP - General (Internal Medicine)  Angelique Solano MD as PCP - [facility]  Shahid Turner MD as Physician (Cardiology)  Akila Church MD as Physician (Gastroenterology)  Yue Dial MD as Physician (Orthopedic Surgery)  William Mcmahon MD as Physician (Ophthalmology)  Isadora Pineda MD (Cardiology)  Karna Sandifer, MD (Cardiology)     Thank you for allowing us to participate in this patient's care.     Mary Carmen Damico NP  [address] [place] [address], [address]  Phone: [phone]

## 2022-05-04 NOTE — PROGRESS NOTES
Physical Therapy    6 MWT results: (Specify if any supplemental oxygen is used, the type, pre, during and post sats.)  Distance Walked in Feet (ft): 628 ft. Pre Heart Rate: 82         Post Heart Rate: 91       Normative data:   Men 39-80 years old = 1889 feet;  Women 3680 years old=1620 feet

## 2022-05-05 ENCOUNTER — TELEPHONE (OUTPATIENT)
Dept: CARDIOLOGY CLINIC | Age: 77
End: 2022-05-05

## 2022-05-05 ENCOUNTER — APPOINTMENT (OUTPATIENT)
Dept: GENERAL RADIOLOGY | Age: 77
DRG: 001 | End: 2022-05-05
Attending: NURSE PRACTITIONER
Payer: MEDICARE

## 2022-05-05 LAB
ALBUMIN SERPL-MCNC: 2.9 G/DL (ref 3.5–5)
ALBUMIN/GLOB SERPL: 0.9 {RATIO} (ref 1.1–2.2)
ALP SERPL-CCNC: 152 U/L (ref 45–117)
ALT SERPL-CCNC: 21 U/L (ref 12–78)
ANION GAP SERPL CALC-SCNC: 6 MMOL/L (ref 5–15)
AST SERPL-CCNC: 66 U/L (ref 15–37)
BILIRUB SERPL-MCNC: 0.7 MG/DL (ref 0.2–1)
BNP SERPL-MCNC: 2099 PG/ML
BUN SERPL-MCNC: 24 MG/DL (ref 6–20)
BUN/CREAT SERPL: 25 (ref 12–20)
CALCIUM SERPL-MCNC: 8.6 MG/DL (ref 8.5–10.1)
CHLORIDE SERPL-SCNC: 101 MMOL/L (ref 97–108)
CK SERPL-CCNC: 28 U/L (ref 39–308)
CO2 SERPL-SCNC: 30 MMOL/L (ref 21–32)
CREAT SERPL-MCNC: 0.97 MG/DL (ref 0.7–1.3)
ERYTHROCYTE [DISTWIDTH] IN BLOOD BY AUTOMATED COUNT: 18.1 % (ref 11.5–14.5)
FERRITIN SERPL-MCNC: 545 NG/ML (ref 26–388)
GLOBULIN SER CALC-MCNC: 3.4 G/DL (ref 2–4)
GLUCOSE BLD STRIP.AUTO-MCNC: 150 MG/DL (ref 65–117)
GLUCOSE BLD STRIP.AUTO-MCNC: 158 MG/DL (ref 65–117)
GLUCOSE BLD STRIP.AUTO-MCNC: 190 MG/DL (ref 65–117)
GLUCOSE BLD STRIP.AUTO-MCNC: 198 MG/DL (ref 65–117)
GLUCOSE SERPL-MCNC: 100 MG/DL (ref 65–100)
HCT VFR BLD AUTO: 26 % (ref 36.6–50.3)
HGB BLD-MCNC: 8.2 G/DL (ref 12.1–17)
INR PPP: 2.5 (ref 0.9–1.1)
IRON SATN MFR SERPL: 11 % (ref 20–50)
IRON SERPL-MCNC: 13 UG/DL (ref 35–150)
LDH SERPL L TO P-CCNC: 172 U/L (ref 85–241)
MAGNESIUM SERPL-MCNC: 2.3 MG/DL (ref 1.6–2.4)
MCH RBC QN AUTO: 25.7 PG (ref 26–34)
MCHC RBC AUTO-ENTMCNC: 31.5 G/DL (ref 30–36.5)
MCV RBC AUTO: 81.5 FL (ref 80–99)
NRBC # BLD: 0 K/UL (ref 0–0.01)
NRBC BLD-RTO: 0 PER 100 WBC
PLATELET # BLD AUTO: 326 K/UL (ref 150–400)
PMV BLD AUTO: 10.3 FL (ref 8.9–12.9)
POTASSIUM SERPL-SCNC: 3.5 MMOL/L (ref 3.5–5.1)
PREALB SERPL-MCNC: 9.1 MG/DL (ref 20–40)
PROT SERPL-MCNC: 6.3 G/DL (ref 6.4–8.2)
PROTHROMBIN TIME: 24.6 SEC (ref 9–11.1)
RBC # BLD AUTO: 3.19 M/UL (ref 4.1–5.7)
SERVICE CMNT-IMP: ABNORMAL
SODIUM SERPL-SCNC: 137 MMOL/L (ref 136–145)
TIBC SERPL-MCNC: 120 UG/DL (ref 250–450)
WBC # BLD AUTO: 6 K/UL (ref 4.1–11.1)

## 2022-05-05 PROCEDURE — 82550 ASSAY OF CK (CPK): CPT

## 2022-05-05 PROCEDURE — 36592 COLLECT BLOOD FROM PICC: CPT

## 2022-05-05 PROCEDURE — 82728 ASSAY OF FERRITIN: CPT

## 2022-05-05 PROCEDURE — 83540 ASSAY OF IRON: CPT

## 2022-05-05 PROCEDURE — 74011250637 HC RX REV CODE- 250/637: Performed by: INTERNAL MEDICINE

## 2022-05-05 PROCEDURE — 83880 ASSAY OF NATRIURETIC PEPTIDE: CPT

## 2022-05-05 PROCEDURE — 97116 GAIT TRAINING THERAPY: CPT

## 2022-05-05 PROCEDURE — 71046 X-RAY EXAM CHEST 2 VIEWS: CPT

## 2022-05-05 PROCEDURE — 74011250637 HC RX REV CODE- 250/637: Performed by: NURSE PRACTITIONER

## 2022-05-05 PROCEDURE — 65660000001 HC RM ICU INTERMED STEPDOWN

## 2022-05-05 PROCEDURE — 85610 PROTHROMBIN TIME: CPT

## 2022-05-05 PROCEDURE — 97530 THERAPEUTIC ACTIVITIES: CPT

## 2022-05-05 PROCEDURE — 83735 ASSAY OF MAGNESIUM: CPT

## 2022-05-05 PROCEDURE — 85027 COMPLETE CBC AUTOMATED: CPT

## 2022-05-05 PROCEDURE — 82962 GLUCOSE BLOOD TEST: CPT

## 2022-05-05 PROCEDURE — 80053 COMPREHEN METABOLIC PANEL: CPT

## 2022-05-05 PROCEDURE — 93750 INTERROGATION VAD IN PERSON: CPT | Performed by: INTERNAL MEDICINE

## 2022-05-05 PROCEDURE — 84134 ASSAY OF PREALBUMIN: CPT

## 2022-05-05 PROCEDURE — 99233 SBSQ HOSP IP/OBS HIGH 50: CPT | Performed by: INTERNAL MEDICINE

## 2022-05-05 PROCEDURE — 36415 COLL VENOUS BLD VENIPUNCTURE: CPT

## 2022-05-05 PROCEDURE — 83615 LACTATE (LD) (LDH) ENZYME: CPT

## 2022-05-05 PROCEDURE — 74011000250 HC RX REV CODE- 250: Performed by: NURSE PRACTITIONER

## 2022-05-05 RX ORDER — WARFARIN SODIUM 5 MG/1
5 TABLET ORAL ONCE
Status: COMPLETED | OUTPATIENT
Start: 2022-05-05 | End: 2022-05-05

## 2022-05-05 RX ORDER — POTASSIUM CHLORIDE 750 MG/1
20 TABLET, FILM COATED, EXTENDED RELEASE ORAL 2 TIMES DAILY
Status: DISCONTINUED | OUTPATIENT
Start: 2022-05-05 | End: 2022-05-06 | Stop reason: HOSPADM

## 2022-05-05 RX ADMIN — COLCHICINE 0.6 MG: 0.6 TABLET, FILM COATED ORAL at 10:00

## 2022-05-05 RX ADMIN — SUCRALFATE 1 G: 1 TABLET ORAL at 07:10

## 2022-05-05 RX ADMIN — HYDRALAZINE HYDROCHLORIDE 50 MG: 50 TABLET, FILM COATED ORAL at 21:50

## 2022-05-05 RX ADMIN — SUCRALFATE 1 G: 1 TABLET ORAL at 21:46

## 2022-05-05 RX ADMIN — POTASSIUM CHLORIDE 20 MEQ: 750 TABLET, EXTENDED RELEASE ORAL at 10:00

## 2022-05-05 RX ADMIN — Medication 400 MG: at 09:59

## 2022-05-05 RX ADMIN — SUCRALFATE 1 G: 1 TABLET ORAL at 17:39

## 2022-05-05 RX ADMIN — DOCUSATE SODIUM 100 MG: 100 CAPSULE, LIQUID FILLED ORAL at 09:59

## 2022-05-05 RX ADMIN — SODIUM CHLORIDE, PRESERVATIVE FREE 10 ML: 5 INJECTION INTRAVENOUS at 07:11

## 2022-05-05 RX ADMIN — FAMOTIDINE 20 MG: 20 TABLET ORAL at 21:46

## 2022-05-05 RX ADMIN — FAMOTIDINE 20 MG: 20 TABLET ORAL at 09:59

## 2022-05-05 RX ADMIN — SACUBITRIL AND VALSARTAN 1 TABLET: 49; 51 TABLET, FILM COATED ORAL at 10:00

## 2022-05-05 RX ADMIN — HYDRALAZINE HYDROCHLORIDE 50 MG: 50 TABLET, FILM COATED ORAL at 17:39

## 2022-05-05 RX ADMIN — HYDRALAZINE HYDROCHLORIDE 50 MG: 50 TABLET, FILM COATED ORAL at 11:46

## 2022-05-05 RX ADMIN — SODIUM CHLORIDE, PRESERVATIVE FREE 10 ML: 5 INJECTION INTRAVENOUS at 21:46

## 2022-05-05 RX ADMIN — MIRTAZAPINE 7.5 MG: 15 TABLET, FILM COATED ORAL at 21:46

## 2022-05-05 RX ADMIN — SACUBITRIL AND VALSARTAN 1 TABLET: 49; 51 TABLET, FILM COATED ORAL at 21:46

## 2022-05-05 RX ADMIN — HYDRALAZINE HYDROCHLORIDE 50 MG: 50 TABLET, FILM COATED ORAL at 04:44

## 2022-05-05 RX ADMIN — SUCRALFATE 1 G: 1 TABLET ORAL at 11:46

## 2022-05-05 RX ADMIN — AMIODARONE HYDROCHLORIDE 200 MG: 200 TABLET ORAL at 10:00

## 2022-05-05 RX ADMIN — ASPIRIN 81 MG CHEWABLE TABLET 81 MG: 81 TABLET CHEWABLE at 10:00

## 2022-05-05 RX ADMIN — POTASSIUM CHLORIDE 20 MEQ: 750 TABLET, EXTENDED RELEASE ORAL at 17:39

## 2022-05-05 RX ADMIN — EPLERENONE 25 MG: 25 TABLET, FILM COATED ORAL at 10:00

## 2022-05-05 RX ADMIN — BUMETANIDE 2 MG: 1 TABLET ORAL at 10:00

## 2022-05-05 RX ADMIN — BUMETANIDE 2 MG: 1 TABLET ORAL at 17:02

## 2022-05-05 RX ADMIN — WARFARIN SODIUM 5 MG: 5 TABLET ORAL at 17:39

## 2022-05-05 NOTE — PROGRESS NOTES
[facility] in [facility], [facility]  Inpatient Progress Note      Patient name: Debi Dugan  Patient : 1945  Patient MRN: [id number]  Consulting MD: Makeda Ybarra MD  Date of service: 22      REASON FOR REFERRAL:  Management of chronic systolic heart failure, LVAD     PLAN OF CARE:  69 y.o. male with severe ischemic cardiomyopathy, LVEF 15-20% s/p HM3 LVAD implant as DT on 22 by Dr. Titi Reyes c/b intraoperative bleeding requiring multiple blood products and subsequent RV failure with severe TR requiring prolonged dobutamine wean   Pt reports that he \"does not want to go home attached to anything other than the LVAD\"; is not interested in [address] catheter or IV dobutamine at home; subsequently weaned   Plan for remainder of hospitalization includes:  Surveillance weekly echos to determine if RV function improves and TV coapts  PT/OT/Nutrition  Optimization of GDMT   Anticipate D/C home tomorrow,  with [facility] SN/PT/OT and close AHF follow up       RECOMMENDATIONS:  POD 28  TTE  showed severe TR, severe RV dysfunction; continue to optimize medically   Continue speed 4800rpms, low speed 4400  Hemodynamically stable off of dobutamine   Continue hydralazine 50 mg PO q8h   Continue amiodarone 200 mg PO daily x 2 weeks (last dose 5/10)   Continue Entresto 49/51mg BID  Cont Eplerenone 25mg daily  Continue Bumex 2 mg PO BID    Completed course of cefepime for sternal incision  Driveline dressing changes daily until discharge  Needs EKG weekly   Continue warfarin, goal INR 2-3; 5 mg tonight   ASA 81mg daily  Iron today   Continue colchicine for pericarditis- will stop on discharge   PRN toradol  Continue bowel regimen   Check prealbumin   CPAP qHS  Advanced care plan forms on file   Strict I/O, daily weights, Na+ restricted diet   Continue VAD education   Will ask Sleep medicine to help troubleshoot mask fitting as OP  Equipment ordered  PA/Lat today to f/u effusion     INTERVAL HISTORY:  Stable off dobutamine   Creatinine stable  PBNP stable  Hgb stable  No acute overnight events    IMPRESSION:  POD 28 S/p LVAD implant as DT  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  Persistent large volume chest tube output  Chronic systolic heart failure  Stage D, NYHA class IV symptoms  Non-ischemic cardiomyopathy, LVEF 20% with LVEDD 6.2  RV dysfunction, TAPSE 1.22 (prelimnary read)  TBili 1.5 likely from cardiac congestion  At risk of sudden cardiac death  Recent cardiac arrest   S/p ICD (1/2013, [employer] followed by [facility]); New implant on 10/21/2021 [employer] Vigilant  Coronary artery disease  S/p multiple interventions  S/p 4V CABG (8/2012)  LHC (7/2019) severe stenosis of LIMA to LAD anastomosis site, SVG graft to OM is occluded, SVG graft to RCA 40-50%, LAD occluded proximally, severe proximal LCx, RCA is the long . PET (6/2019) EF 24% with anterior lateral and inferior reversible defect  Cardiac risk factors  HTN  HL  DM2  SRUTHI on CPAP  MIld carotid stenosis  Anemia, microcytic  Iron deficiency  DVT with filter  Pulmonary nodules   Dysphagia       LIFE GOALS:  Patient's personal goals include: getting stronger and going home soon  Important upcoming milestones or family events: The patient identifies the following as important for living well: being independent, being at home, enjoying family time        [facility]:  Echo 5/4/22 LVEF 15-20%, mod TR, LVIDd 5.3 cm, TAPSE 0.9 cm   Echo 4/26/22 - LVIDd 4.9 cm, TAPSE 0.5 cm,   Echo 4/18/22- LVEF 15-20%, severe TR, TAPSE 0.5cm, RVIDd 6cm   Echo (4/8/22)    Left Ventricle: Left ventricle size is normal. Moderately increased wall thickness. Findings consistent with concentric remodeling. Severe global hypokinesis present. Severely reduced left ventricular systolic function with a visually estimated EF of 10 -15%.  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 Aortic Valve: Moderate sclerosis of the aortic valve cusp. Mitral Valve: Moderately thickened leaflet. Mild transvalvular regurgitation. Tricuspid Valve: Severe transvalvular regurgitation. Right Atrium: Right atrium is severely dilated. Echo (3/2/22)   LV 10-15%  Mod-severe MR      Echo (11/23/21):  LV 15-20%. Mod-severe, MR, mod-TR     Echo (5/20/21)  LV: Estimated LVEF is 20 - 25%. Normal wall thickness. Mildly dilated left ventricle. Severely and globally reduced systolic function. Severe (grade 4) left ventricular diastolic dysfunction. LA: Severely dilated left atrium. RA: Severely dilated right atrium. MV: Moderate mitral valve regurgitation is present. TV: Moderate tricuspid valve regurgitation is present. AO: Mild aortic root dilatation. RV: Pacer/ICD present. LVED 6.2cm     EKG (5/20/21) SB with 1degree AV block, QRS 116ms     LHC (5/24/21) Native Coronaries: LM - moderate to severe distal disease 50%. % prox occluded (), heavily calcified, LCx: 80% proximal stenosis. OM1 is  (seen filling faintly via collaterals). OM2 99% stenosis. RCA: 100% proximal occluded. LIMA to LAD: patent, supplies only a diagonal branch (probably D2). The LAD distal to the bifurcation is 100% occluded () and fills via right to left collaterals off the SVG to RCA. SVG to R-PDA: patent with moderate diffuse irregularities but no obstructive disease in the graft. No appealing interventional targets. [address] Report 5/4/2022 5/2/2022 11/12/2021 7/6/2021 5/20/2021   (PRE) HR 82 82 88 98 74   (PRE) O2 Sat - 98 100 - 99   (POST) HR 91 88 102 - 81   (POST) O2 Sat - 98 99 98% on Ra 99   Distance in Meters 191.41 181.36 386. 62 - 1158.24         BRIEF HISTORY OF PRESENT ILLNESS:  Angie Ashley is a 69 y.o. male with h/o HTN, HL, DM2, SRUTHI on CPAP, chronic systolic heart failure, stage D, NYHA class IV symptoms, non-ischemic cardiomyopathy, LVEF 20% with LVEDD 6.2, RV dysfunciton, TAPSE 1.22, TBili 1.5 likely from cardiac congestion, recent cardiac arrest s/p ICD (1/2013, [employer] followed by [address] [place] [address]), coronary artery disease s/p multiple interventions s/p 4V CABG (8/2012), [facility] (7/2019) severe stenosis of LIMA to LAD anastomosis site, SVG graft to OM is occluded, SVG graft to RCA 40-50%, LAD occluded proximally, severe proximal LCx, RCA is the long . PET (6/2019) EF 24% with anterior lateral and inferior reversible defect. Anemia, microcytic with iron deficiency, DVT with filter. Patient was referred to [facility] by Dr. Rosy Hurtado in 2021 for evaluation of his candidacy for advanced therapies. Patient agreed to inpatient initiation of palliative infusion of chronic inotropes and evaluation for LVAD-DT. Patient was admitted 5/2-5/25/21. Patient was started on milrinone infusion and had first part of LVAD evaluation completed. Right and left heart cath on 5/24/21 showed severe diffuse native vessel CAD, with patent grafts (LIMA to D2, SVG to RCA). Pt was found to have pulmonary nodule during workup. Antiplatelet therapy was held during hospitalization and after discharge due to anticipated pulmonary nodule biopsy, bone marrow biopsy and EGD/C-Scope as part of LVAD workup. Pt found to have adenocarcinoma of the lung, and so LVAD was deferred pending treatment. Patient completed radiation treatment for adenocarcinoma of the lung. Hem-onc cleared patient for VAD implant with 90% 2 year prognosis. He was most recently admitted for elective pre-op EGD and colonoscopy which he tolerated well; path negative for malignancy. He presented to [facility] on 4/3 for admission for planned LVAD implant. Underwent LVAD implant on 4/5/22. Was re-do sternotomy, bleeding intra-op, required cryo, k-centra, FFP, Plt, and cellsaver in OR. Had RV dysfunction noted intra-op; requiring lower VAD speed and dual inotrope support.   Inotropic support was able to be decreased, but pt now remains on dobutamine at 6mcg/kg/min. Pt ans wife have been doing well with LVAD education. Pt has been OOB, is eating well and moving his bowels. Therapeutic on warfarin. REVIEW OF SYSTEMS:  Review of Systems   Constitutional: Positive for malaise/fatigue. Negative for chills, fever and weight loss. Respiratory: Negative for cough and shortness of breath. Cardiovascular: Negative for chest pain, palpitations, orthopnea and leg swelling. Gastrointestinal: Negative for abdominal pain, constipation, diarrhea, heartburn, nausea and vomiting. Neurological: Negative for dizziness and weakness. Psychiatric/Behavioral: Negative for depression. PHYSICAL EXAM:  Visit Vitals  /88 (BP 1 Location: Left upper arm, BP Patient Position: At rest)   Pulse 86   Temp 99.4 °F (37.4 °C)   Resp 16   Ht 6' 1\" (1.854 m)   Wt 172 lb (78 kg)   SpO2 97%   BMI 22.69 kg/m²       LVAD INTERROGATION:  Device interrogated in person  Device function normal, normal flow, no events  LVAD   Pump Speed (RPM): 4800  Pump Flow (LPM): 4  MAP: 80  PI (Pulsitility Index): 5.1  Power: 3.1   Test: No  Back Up  at Bedside & Labeled: Yes  Power Module Test: No  Driveline Site Care: No  Driveline Dressing: Clean, Dry, and Intact  MAP in Therapeutic Range (Outpatient): Yes  Testing  Alarms Reviewed: Yes  Back up SC speed: 4800  Back up Low Speed Limit: 4000  Emergency Equipment with Patient?: Yes  Emergency procedures reviewed?: Yes  Drive line site inspected?: Yes  Drive line intergrity inspected?: Yes  Drive line dressing changed?: No (change previous shift)      Physical Exam  Vitals and nursing note reviewed. Constitutional:       General: He is not in acute distress. Appearance: Normal appearance. Cardiovascular:      Rate and Rhythm: Normal rate and regular rhythm.       Comments: LVAD Hum noted on auscultation  Pulmonary:      Effort: Pulmonary effort is normal. No respiratory distress. Abdominal:      General: Bowel sounds are normal. There is no distension. Palpations: Abdomen is soft. Tenderness: There is no abdominal tenderness. Musculoskeletal:      Right lower leg: No edema. Left lower leg: No edema. Skin:     General: Skin is warm and dry. Capillary Refill: Capillary refill takes less than 2 seconds. Neurological:      General: No focal deficit present. Mental Status: He is alert and oriented to person, place, and time. Psychiatric:         Mood and Affect: Mood normal.         Behavior: Behavior normal.         Thought Content: Thought content normal.         Judgment: Judgment normal.              PAST MEDICAL HISTORY:  Past Medical History:   Diagnosis Date    CAD (coronary artery disease) '90  '97, '13 x 2    MI, code ice in 2013 at [facility]    Calculus of kidney     Colonic polyps     Congestive heart failure, unspecified     Diabetes ([facility] [address]     Gastritis     Hypercholesterolemia     Sleep apnea        PAST SURGICAL HISTORY:  Past Surgical History:   Procedure Laterality Date    COLONOSCOPY  04/06/2011    16, due 21    COLONOSCOPY N/A 3/2/2022    COLONOSCOPY    :- performed by Leone Lombard, MD at [facility] ENDOSCOPY    ENDOSCOPY, COLON, DIAGNOSTIC      11, due 16    HX CORONARY ARTERY BYPASS GRAFT  8/22/12    x 4 vessel by S.  James    HX HEENT      LASIK    HX PACEMAKER PLACEMENT  1/30/13    HI CARDIAC SURG PROCEDURE UNLIST  2012    x 4 vessels       FAMILY HISTORY:  Family History   Problem Relation Age of Onset    Heart Disease Mother         MI    Heart Disease Father         CAD & PVD    Lung Disease Father     Cancer Father         lung    Diabetes Maternal Grandmother     Heart Disease Other         CAD    Stroke Sister        SOCIAL HISTORY:  Social History     Socioeconomic History    Marital status:    Tobacco Use    Smoking status: Never Smoker    Smokeless tobacco: Never Used   Vaping Use    Vaping Use: Never used   Substance and Sexual Activity    Alcohol use: Yes     Alcohol/week: 0.0 standard drinks     Comment:  VERY RARE    Drug use: No       LABORATORY RESULTS:     Labs Latest Ref Rng & Units 5/5/2022 5/4/2022 5/3/2022 5/2/2022 5/1/2022 4/30/2022 4/29/2022   WBC 4.1 - 11.1 K/uL 6.0 6.5 5.3 5.7 5.5 5.6 5.9   RBC 4.10 - 5.70 M/uL 3.19(L) 3.19(L) 3.19(L) 3.08(L) 2.92(L) 2.95(L) 3.23(L)   Hemoglobin 12.1 - 17.0 g/dL 8. 2(L) 8.2(L) 8. 1(L) 7. 9(L) 7. 6(L) 7. 6(L) 8.4(L)   Hematocrit 36.6 - 50.3 % 26. 0(L) 26. 4(L) 25. 7(L) 25. 8(L) 24. 7(L) 24. 6(L) 26. 6(L)   MCV 80.0 - 99.0 FL 81.5 82.8 80.6 83.8 84.6 83.4 82.4   Platelets 737 - 148 K/uL 326 293 315 298 274 275 299   Lymphocytes 12 - 49 % - - - - - - -   Monocytes 5 - 13 % - - - - - - -   Eosinophils 0 - 7 % - - - - - - -   Basophils 0 - 1 % - - - - - - -   Bands 0 - 6 % - - - - - - -   Albumin 3.5 - 5.0 g/dL 2. 9(L) 3.0(L) 2. 8(L) 2. 9(L) 2. 6(L) 2. 8(L) 3.0(L)   Calcium 8.5 - 10.1 MG/DL 8.6 8.4(L) 8.6 8.2(L) 8.0(L) 7. 7(L) 7. 9(L)   Glucose 65 - 100 mg/dL 100 166(H) 102(H) 95 199(H) 125(H) 117(H)   BUN 6 - 20 MG/DL 24(H) 20 21(H) 18 17 24(H) 23(H)   Creatinine 0.70 - 1.30 MG/DL 0.97 0.92 0.86 0.86 0.86 1.01 1.00   Sodium 136 - 145 mmol/L 137 134(L) 136 138 133(L) 137 136   Potassium 3.5 - 5.1 mmol/L 3.5 3.8 3.6 3.6 3.7 4.0 3.9   TSH 0.36 - 3.74 uIU/mL - - - - - - -   LDH 85 - 241 U/L 172 170 175 163 177 158 176   Some recent data might be hidden     Lab Results   Component Value Date/Time    TSH 2.26 04/10/2022 03:00 AM    TSH 1.68 03/01/2022 11:50 AM    TSH 1.47 05/26/2021 10:44 PM    TSH 1.97 05/20/2021 04:28 PM    TSH 1.41 02/09/2021 03:05 PM    TSH 1.740 08/14/2018 09:58 AM    TSH 1.710 10/18/2017 12:00 AM       ALLERGY:  Allergies   Allergen Reactions    Oxycodone Anaphylaxis    Pcn [Penicillins] Hives        CURRENT MEDICATIONS:    Current Facility-Administered Medications:     potassium chloride SR (KLOR-CON 10) tablet 20 mEq, 20 mEq, Oral, BID, Polliard, Jf Beltre T, NP, 20 mEq at 05/05/22 1000    warfarin (COUMADIN) tablet 5 mg, 5 mg, Oral, ONCE, Polliard, Virlinda Floor, NP    sacubitriL-valsartan (ENTRESTO) 49-51 mg tablet 1 Tablet, 1 Tablet, Oral, Q12H, Galileo JACOBSON, NP, 1 Tablet at 05/05/22 1000    magnesium oxide (MAG-OX) tablet 400 mg, 400 mg, Oral, DAILY, Polliard, Virlinda Floor, NP, 400 mg at 05/05/22 1201    bumetanide (BUMEX) tablet 2 mg, 2 mg, Oral, BID, Polliard, Virlinda Floor, NP, 2 mg at 05/05/22 1000    amiodarone (CORDARONE) tablet 200 mg, 200 mg, Oral, DAILY, Polliard, Mehran Dunks T, NP, 200 mg at 05/05/22 1000    sodium chloride (OCEAN) 0.65 % nasal squeeze bottle 2 Spray, 2 Spray, Both Nostrils, Q2H PRN, Danica Stewart, NP    docusate sodium (COLACE) capsule 100 mg, 100 mg, Oral, DAILY, TerryDanica reyes, NP, 100 mg at 05/05/22 0959    eplerenone (INSPRA) tablet 25 mg, 25 mg, Oral, DAILY, Danica Stewart, NP, 25 mg at 05/05/22 1000    guaiFENesin (ROBITUSSIN) 100 mg/5 mL oral liquid 100 mg, 100 mg, Oral, Q4H PRN, Mike Randolph MD, 100 mg at 05/03/22 2344    polyethylene glycol (MIRALAX) packet 17 g, 17 g, Oral, DAILY, Galileo JACOBSON, NP, 17 g at 05/03/22 0831    senna-docusate (PERICOLACE) 8.6-50 mg per tablet 1 Tablet, 1 Tablet, Oral, DAILY PRN, Galileo JACOBSON NP, 1 Tablet at 04/24/22 0913    oxymetazoline (AFRIN) 0.05 % nasal spray 2 Spray, 2 Spray, Both Nostrils, BID PRN, Galileo JACOBSON, NP, 2 Spray at 04/24/22 1612    hydrALAZINE (APRESOLINE) tablet 50 mg, 50 mg, Oral, Q8H, Mehran Wong NP, 50 mg at 05/05/22 0444    colchicine tablet 0.6 mg, 0.6 mg, Oral, DAILY, Mehran Wong NP, 0.6 mg at 05/05/22 1000    alteplase (CATHFLO) 1 mg in sterile water (preservative free) 1 mL injection, 1 mg, InterCATHeter, PRN, Bing Pabon MD, 1 mg at 05/02/22 0041    bisacodyL (DULCOLAX) suppository 10 mg, 10 mg, Rectal, DAILY PRN, Parris Lu MD, 10 mg at 04/12/22 1533    famotidine (PEPCID) tablet 20 mg, 20 mg, Oral, Q12H, Parris Lu MD, 20 mg at 05/05/22 0959    mirtazapine (REMERON) tablet 7.5 mg, 7.5 mg, Oral, QHS, Terry, Danica B, NP, 7.5 mg at 05/04/22 2356    lidocaine 4 % patch 1 Patch, 1 Patch, TransDERmal, Q24H, Terry, Danica B, NP, 1 Patch at 04/30/22 1250    Warfarin NP/MD dosing, , Other, PRN, Terry, Danica B, NP    melatonin tablet 3 mg, 3 mg, Oral, QHS PRN, Emani Almaguer MD, 3 mg at 04/20/22 2127    hydrALAZINE (APRESOLINE) 20 mg/mL injection 10 mg, 10 mg, IntraVENous, Q6H PRN, Voncille Budds B, NP, 10 mg at 05/04/22 0004    aspirin chewable tablet 81 mg, 81 mg, Oral, DAILY, Voncille Budds B, NP, 81 mg at 05/05/22 1000    hydrALAZINE (APRESOLINE) 20 mg/mL injection 5 mg, 5 mg, IntraVENous, Q6H PRN, Voncille Budds B, NP, 5 mg at 04/13/22 1350    sodium chloride (NS) flush 5-40 mL, 5-40 mL, IntraVENous, Q8H, Leonid Wong NP, 10 mL at 05/05/22 0711    sodium chloride (NS) flush 5-40 mL, 5-40 mL, IntraVENous, PRN, Leonid Wong NP, 10 mL at 04/24/22 0914    0.9% sodium chloride infusion, 9 mL/hr, IntraVENous, CONTINUOUS, Beni Wong NP, Last Rate: 3 mL/hr at 04/20/22 0513, 3 mL/hr at 04/20/22 0513    acetaminophen (TYLENOL) tablet 650 mg, 650 mg, Oral, Q6H PRN, Beni Wong NP, 650 mg at 05/04/22 0834    naloxone (NARCAN) injection 0.4 mg, 0.4 mg, IntraVENous, PRN, Beni Wong NP    ondansetron (ZOFRAN) injection 4 mg, 4 mg, IntraVENous, Q4H PRN, Polliard, [place] Allegra T, NP, 4 mg at 04/11/22 2139    albuterol-ipratropium (DUO-NEB) 2.5 MG-0.5 MG/3 ML, 3 mL, Nebulization, Q6H PRN, Polliard, Leonid Gregorya T, NP    glucose chewable tablet 16 g, 4 Tablet, Oral, PRN, Polliard, Leonid Jduahgra T, NP    glucagon (GLUCAGEN) injection 1 mg, 1 mg, IntraMUSCular, PRN, Polliard, Leonid Allegra T, NP    insulin lispro (HUMALOG) injection, , SubCUTAneous, AC&HS, Polliard, Leonid Jenkinsa T, NP, 2 Units at 05/04/22 1148    sucralfate (CARAFATE) tablet 1 g, 1 g, Oral, AC&HS, Polliard, Leonid Sogra T, NP, 1 g at 05/05/22 0710    0.45% sodium chloride infusion, 10 mL/hr, IntraVENous, CONTINUOUS, Maikol Najera MD, Stopped at 04/10/22 0730    fentaNYL citrate (PF) injection 25 mcg, 25 mcg, IntraVENous, Q2H PRN, Zuri Wong NP, 25 mcg at 05/02/22 2151    PATIENT CARE TEAM:  Patient Care Team:  Sabino Lock MD as PCP - General (Internal Medicine Physician)  Sabino Lock MD as PCP - [address] [place] [address] Provider  Jeremiah Mclaughlin MD as Physician (Cardiovascular Disease Physician)  Harjit Isidro MD as Physician (Gastroenterology)  Dutch Donovan MD as Physician (Orthopedic Surgery)  Lucho Cunningham MD as Physician (Ophthalmology)  Zully Rodriguez MD (Cardiovascular Disease Physician)  Roe Herron MD (Cardiovascular Disease Physician)     Thank you for allowing us to participate in this patient's care. Pb Li NP  [address] [place] [address], [address]  Phone: [phone]      [facility] ATTENDING ADDENDUM    Patient was seen and examined in person. Data and notes were reviewed. I have discussed and agree with the plan as noted in the NP note above without further additions.     Caro Sung MD PhD  Gay Mota

## 2022-05-05 NOTE — PROGRESS NOTES
Problem: Mobility Impaired (Adult and Pediatric)  Goal: *Acute Goals and Plan of Care (Insert Text)  Description: FUNCTIONAL STATUS PRIOR TO ADMISSION: Patient was independent and active without use of DME.    HOME SUPPORT PRIOR TO ADMISSION: The patient lived with his wife but did not require assist.    Physical Therapy Goals- Continue goals with exception of #7 for next 7 days 4/22/2022, continue all goals 4/29/2022    Weekly Reassessment 4/15/2022 (goals progressed)  1. Patient will move from supine to sit and sit to supine , scoot up and down, and roll side to side in bed with head of bed flat with supervision/set-up within 7 days. 2.  Patient will perform sit to/from stand from chair height with supervision/set-up within 7 days. 3.  Patient will ambulate 300 feet stand by assist within 7 days. 4.  Patient will ascend/descend 6 stairs with handrail(s) with minimal assistance/contact guard assist within 14 days. 5.  Patient will perform cardiac exercises per protocol with supervision/set-up within 7 days. 6.  Patient will verbally and functionally recall mindful movement precautions within 7 days. 7.  Patient will perform power exchange for power module to/from battery with stand by assist within 7 days. -MET currently independent 4/22/2022    Initiated 4/8/2022  1. Patient will move from supine to sit and sit to supine , scoot up and down, and roll side to side in bed with supervision/set-up within 7 days. 2.  Patient will perform sit to/from stand with supervision/set-up within 7 days. 3.  Patient will ambulate 50 feet with least restrictive assistive device and minimal assistance/contact guard assist within 7 days. 4.  Patient will ascend/descend 6 stairs with handrail(s) with minimal assistance/contact guard assist within 14 days. 5.  Patient will perform cardiac exercises per protocol with supervision/set-up within 7 days.   6.  Patient will verbally and functionally recall 3/3 sternal precautions within 7 days. 7.  Patient will perform power exchange for power module to/from battery with minimal assistance within 7 days. Outcome: Progressing Towards Goal       PHYSICAL THERAPY TREATMENT  Patient: Jarrod Tamez (1 y.o. male)  Date: 5/5/2022  Diagnosis: HFrEF (heart failure with reduced ejection fraction) ([facility]) [I50.20] <principal problem not specified>  Procedure(s) (LRB):  REDO STERNOTOMY; RIGHT GROIN CUTDOWN FOR CANNULATION;  INSERTION OF LEFT VENTRICULAR ASSIST DEVICE (HMIII); AORTIC VALVE CLOSURE CHEL BY DR. Gissell Yun. (N/A) 30 Days Post-Op  Precautions: Fall (mindful movement, LVAD )  Chart, physical therapy assessment, plan of care and goals were reviewed. ASSESSMENT  Patient continues with skilled PT services and is progressing towards goals. Pt agreeable to mobilize. Pt already changed over to batteries. Pt with a prolong stand and the windows for  the sun. Pt is hopeful to discharge tomorrow,     Current Level of Function Impacting Discharge (mobility/balance): SBA    Other factors to consider for discharge: LVAD, 5/6/22 discharge? PLAN :  Patient continues to benefit from skilled intervention to address the above impairments. Continue treatment per established plan of care. to address goals.     Recommendation for discharge: (in order for the patient to meet his/her long term goals)  Physical therapy at least 2 days/week in the home AND ensure assist and/or supervision for safety with LVAD     This discharge recommendation:  Has not yet been discussed the attending provider and/or case management    IF patient discharges home will need the following DME: none       SUBJECTIVE:   Patient stated This feels good,  like an energy boost.    OBJECTIVE DATA SUMMARY:   Critical Behavior:  Neurologic State: Alert  Orientation Level: Appropriate for age  Cognition: Appropriate decision making,Appropriate for age attention/concentration,Appropriate safety awareness     Functional Mobility Training:  Bed Mobility:     Supine to Sit: Modified independent              Transfers:  Sit to Stand: Modified independent  Stand to Sit: Modified independent                             Balance:  Sitting: Intact  Standing: Impaired  Standing - Static: Good  Standing - Dynamic : Good  Ambulation/Gait Training:  Distance (ft): 300 Feet (ft)     Ambulation - Level of Assistance: Stand-by assistance        Gait Abnormalities: Decreased step clearance        Base of Support: Center of gravity altered        Step Length: Left shortened;Right shortened                    Stairs: Therapeutic Exercises:     Pain Rating:  none    Activity Tolerance:   requires rest breaks    After treatment patient left in no apparent distress:   Sitting in chair and Call bell within reach    COMMUNICATION/COLLABORATION:   The patients plan of care was discussed with: Registered nurse.      Héctor Dutta PTA   Time Calculation: 27 mins

## 2022-05-05 NOTE — PROGRESS NOTES
[address] [facility], [facility]   Tel.  [phone]   Fax. [address] [facility], [address] [facility]   Tel.  [phone]   Fax. [phone] [address] Linda Torres    Tel.  [phone]   Fax. [phone]         Subjective:     Jo Shah is a 69y.o. year old male was previously evaluated on 4/21/22 and requested to follow-up with the [place]. Patient seen today at the request of Kyler Hu NP for assistance with CPAP mask issues. Active Problems List    NYHA Class IV with an EF of 20%   HTN   DM2   SRUTHI on CPAP    Assessment:          No flowsheet data found. Plan:      Patient reports he may benefit from mask replacement but prefers to speak with his [facility] sleep provider to have it replaced. He was given the [address] [facility] contact info if he changes his mind.  Corresponding risk factors for sleep apnea and the importance of proper treatment were reviewed.  Reviewed how treating sleep apnea can help improve heart function     The patient was counseled regarding proper sleep hygiene, with emphasis on ensuring sufficient total sleep time; safe driving and the benefits of exercise and weight loss.  All of his questions were addressed. CORBIN Lawler  Electronically signed.  05/05/22

## 2022-05-05 NOTE — TELEPHONE ENCOUNTER
Returned call to patient's wife, reviewed plan of care and answered questions to her satisfaction. Encouraged her to call with any other questions. Plan to meet in person tomorrow AM to review D/C instructions.

## 2022-05-05 NOTE — PROGRESS NOTES
0815: Bedside and Verbal shift change report given to Antonella Chou RN  (oncoming nurse) by Beulah Bravo  (offgoing nurse).  Report included the following information SBAR, Kardex, Procedure Summary, MAR, Recent Results, Med Rec Status and Cardiac Rhythm SR.

## 2022-05-05 NOTE — PROGRESS NOTES
[employer] interrogated and info faxed in.  Daughter performed sterile dressing change today- (sticker board unavailable for this family member- but required verbal que's for sterile field set up.)

## 2022-05-06 ENCOUNTER — APPOINTMENT (OUTPATIENT)
Dept: GENERAL RADIOLOGY | Age: 77
DRG: 001 | End: 2022-05-06
Attending: NURSE PRACTITIONER
Payer: MEDICARE

## 2022-05-06 VITALS
HEIGHT: 73 IN | BODY MASS INDEX: 22.32 KG/M2 | HEART RATE: 83 BPM | TEMPERATURE: 98.3 F | OXYGEN SATURATION: 99 % | SYSTOLIC BLOOD PRESSURE: 114 MMHG | WEIGHT: 168.43 LBS | RESPIRATION RATE: 18 BRPM | DIASTOLIC BLOOD PRESSURE: 88 MMHG

## 2022-05-06 LAB
ABO + RH BLD: NORMAL
ABO + RH BLD: NORMAL
ALBUMIN SERPL-MCNC: 2.9 G/DL (ref 3.5–5)
ALBUMIN/GLOB SERPL: 0.8 {RATIO} (ref 1.1–2.2)
ALP SERPL-CCNC: 154 U/L (ref 45–117)
ALT SERPL-CCNC: 24 U/L (ref 12–78)
ANION GAP SERPL CALC-SCNC: 6 MMOL/L (ref 5–15)
AST SERPL-CCNC: 77 U/L (ref 15–37)
BILIRUB SERPL-MCNC: 0.6 MG/DL (ref 0.2–1)
BLOOD GROUP ANTIBODIES SERPL: NORMAL
BLOOD GROUP ANTIBODIES SERPL: NORMAL
BNP SERPL-MCNC: 2114 PG/ML
BUN SERPL-MCNC: 25 MG/DL (ref 6–20)
BUN/CREAT SERPL: 27 (ref 12–20)
CALCIUM SERPL-MCNC: 8.9 MG/DL (ref 8.5–10.1)
CHLORIDE SERPL-SCNC: 101 MMOL/L (ref 97–108)
CO2 SERPL-SCNC: 29 MMOL/L (ref 21–32)
CREAT SERPL-MCNC: 0.93 MG/DL (ref 0.7–1.3)
ERYTHROCYTE [DISTWIDTH] IN BLOOD BY AUTOMATED COUNT: 18 % (ref 11.5–14.5)
GLOBULIN SER CALC-MCNC: 3.6 G/DL (ref 2–4)
GLUCOSE BLD STRIP.AUTO-MCNC: 131 MG/DL (ref 65–117)
GLUCOSE BLD STRIP.AUTO-MCNC: 219 MG/DL (ref 65–117)
GLUCOSE SERPL-MCNC: 111 MG/DL (ref 65–100)
HCT VFR BLD AUTO: 27.6 % (ref 36.6–50.3)
HGB BLD-MCNC: 8.6 G/DL (ref 12.1–17)
INR PPP: 2.6 (ref 0.9–1.1)
LDH SERPL L TO P-CCNC: 172 U/L (ref 85–241)
MAGNESIUM SERPL-MCNC: 2 MG/DL (ref 1.6–2.4)
MCH RBC QN AUTO: 25.5 PG (ref 26–34)
MCHC RBC AUTO-ENTMCNC: 31.2 G/DL (ref 30–36.5)
MCV RBC AUTO: 81.9 FL (ref 80–99)
NRBC # BLD: 0 K/UL (ref 0–0.01)
NRBC BLD-RTO: 0 PER 100 WBC
PLATELET # BLD AUTO: 358 K/UL (ref 150–400)
PMV BLD AUTO: 10 FL (ref 8.9–12.9)
POTASSIUM SERPL-SCNC: 3.7 MMOL/L (ref 3.5–5.1)
PROT SERPL-MCNC: 6.5 G/DL (ref 6.4–8.2)
PROTHROMBIN TIME: 25.2 SEC (ref 9–11.1)
RBC # BLD AUTO: 3.37 M/UL (ref 4.1–5.7)
SERVICE CMNT-IMP: ABNORMAL
SERVICE CMNT-IMP: ABNORMAL
SODIUM SERPL-SCNC: 136 MMOL/L (ref 136–145)
SPECIMEN EXP DATE BLD: NORMAL
SPECIMEN EXP DATE BLD: NORMAL
WBC # BLD AUTO: 6 K/UL (ref 4.1–11.1)

## 2022-05-06 PROCEDURE — 86900 BLOOD TYPING SEROLOGIC ABO: CPT

## 2022-05-06 PROCEDURE — 93288 INTERROG EVL PM/LDLS PM IP: CPT | Performed by: INTERNAL MEDICINE

## 2022-05-06 PROCEDURE — 97530 THERAPEUTIC ACTIVITIES: CPT

## 2022-05-06 PROCEDURE — 83615 LACTATE (LD) (LDH) ENZYME: CPT

## 2022-05-06 PROCEDURE — 99239 HOSP IP/OBS DSCHRG MGMT >30: CPT | Performed by: INTERNAL MEDICINE

## 2022-05-06 PROCEDURE — 83735 ASSAY OF MAGNESIUM: CPT

## 2022-05-06 PROCEDURE — 74011000250 HC RX REV CODE- 250: Performed by: NURSE PRACTITIONER

## 2022-05-06 PROCEDURE — 74011250637 HC RX REV CODE- 250/637: Performed by: NURSE PRACTITIONER

## 2022-05-06 PROCEDURE — 71045 X-RAY EXAM CHEST 1 VIEW: CPT

## 2022-05-06 PROCEDURE — 74011250637 HC RX REV CODE- 250/637: Performed by: INTERNAL MEDICINE

## 2022-05-06 PROCEDURE — 83880 ASSAY OF NATRIURETIC PEPTIDE: CPT

## 2022-05-06 PROCEDURE — 74011636637 HC RX REV CODE- 636/637: Performed by: NURSE PRACTITIONER

## 2022-05-06 PROCEDURE — 74011000250 HC RX REV CODE- 250: Performed by: THORACIC SURGERY (CARDIOTHORACIC VASCULAR SURGERY)

## 2022-05-06 PROCEDURE — 82962 GLUCOSE BLOOD TEST: CPT

## 2022-05-06 PROCEDURE — 80053 COMPREHEN METABOLIC PANEL: CPT

## 2022-05-06 PROCEDURE — 36415 COLL VENOUS BLD VENIPUNCTURE: CPT

## 2022-05-06 PROCEDURE — 85027 COMPLETE CBC AUTOMATED: CPT

## 2022-05-06 PROCEDURE — 85610 PROTHROMBIN TIME: CPT

## 2022-05-06 PROCEDURE — 97116 GAIT TRAINING THERAPY: CPT

## 2022-05-06 RX ORDER — BUMETANIDE 2 MG/1
2 TABLET ORAL 2 TIMES DAILY
Qty: 90 TABLET | Refills: 1 | Status: SHIPPED | OUTPATIENT
Start: 2022-05-06 | End: 2022-05-10

## 2022-05-06 RX ORDER — BACITRACIN 500 UNIT/G
1 PACKET (EA) TOPICAL AS NEEDED
Status: DISCONTINUED | OUTPATIENT
Start: 2022-05-06 | End: 2022-05-06 | Stop reason: HOSPADM

## 2022-05-06 RX ORDER — DOCUSATE SODIUM 100 MG/1
100 CAPSULE, LIQUID FILLED ORAL DAILY
Qty: 30 CAPSULE | Refills: 2 | Status: SHIPPED | OUTPATIENT
Start: 2022-05-07 | End: 2022-06-27 | Stop reason: SDUPTHER

## 2022-05-06 RX ORDER — FAMOTIDINE 20 MG/1
20 TABLET, FILM COATED ORAL EVERY 12 HOURS
Qty: 60 TABLET | Refills: 2 | Status: SHIPPED | OUTPATIENT
Start: 2022-05-06 | End: 2022-08-02

## 2022-05-06 RX ORDER — EPLERENONE 50 MG/1
25 TABLET, FILM COATED ORAL DAILY
Qty: 90 TABLET | Refills: 1 | Status: SHIPPED | OUTPATIENT
Start: 2022-05-06 | End: 2022-06-16 | Stop reason: SDUPTHER

## 2022-05-06 RX ORDER — SUCRALFATE 1 G/1
1 TABLET ORAL
Qty: 120 TABLET | Refills: 2 | Status: SHIPPED | OUTPATIENT
Start: 2022-05-06 | End: 2022-08-02

## 2022-05-06 RX ORDER — COLCHICINE 0.6 MG/1
0.6 TABLET ORAL DAILY
Qty: 30 TABLET | Refills: 0 | Status: SHIPPED | OUTPATIENT
Start: 2022-05-07 | End: 2022-05-06

## 2022-05-06 RX ORDER — LANOLIN ALCOHOL/MO/W.PET/CERES
400 CREAM (GRAM) TOPICAL DAILY
Qty: 30 TABLET | Refills: 2 | Status: SHIPPED | OUTPATIENT
Start: 2022-05-07 | End: 2022-06-27 | Stop reason: SDUPTHER

## 2022-05-06 RX ORDER — MIRTAZAPINE 7.5 MG/1
7.5 TABLET, FILM COATED ORAL
Qty: 30 TABLET | Refills: 2 | Status: SHIPPED | OUTPATIENT
Start: 2022-05-06 | End: 2022-06-27 | Stop reason: SDUPTHER

## 2022-05-06 RX ORDER — AMIODARONE HYDROCHLORIDE 200 MG/1
200 TABLET ORAL DAILY
Qty: 4 TABLET | Refills: 0 | Status: SHIPPED | OUTPATIENT
Start: 2022-05-07 | End: 2022-05-12 | Stop reason: ALTCHOICE

## 2022-05-06 RX ORDER — HYDRALAZINE HYDROCHLORIDE 50 MG/1
50 TABLET, FILM COATED ORAL 3 TIMES DAILY
Qty: 180 TABLET | Refills: 2 | Status: SHIPPED | OUTPATIENT
Start: 2022-05-06 | End: 2022-06-02

## 2022-05-06 RX ORDER — WARFARIN 2.5 MG/1
5 TABLET ORAL DAILY
Qty: 90 TABLET | Refills: 2 | Status: SHIPPED | OUTPATIENT
Start: 2022-05-06

## 2022-05-06 RX ADMIN — FAMOTIDINE 20 MG: 20 TABLET ORAL at 08:59

## 2022-05-06 RX ADMIN — AMIODARONE HYDROCHLORIDE 200 MG: 200 TABLET ORAL at 08:59

## 2022-05-06 RX ADMIN — HYDRALAZINE HYDROCHLORIDE 50 MG: 50 TABLET, FILM COATED ORAL at 03:58

## 2022-05-06 RX ADMIN — Medication 400 MG: at 08:59

## 2022-05-06 RX ADMIN — HYDRALAZINE HYDROCHLORIDE 50 MG: 50 TABLET, FILM COATED ORAL at 13:22

## 2022-05-06 RX ADMIN — Medication 2 UNITS: at 13:22

## 2022-05-06 RX ADMIN — SUCRALFATE 1 G: 1 TABLET ORAL at 06:43

## 2022-05-06 RX ADMIN — EPLERENONE 25 MG: 25 TABLET, FILM COATED ORAL at 08:59

## 2022-05-06 RX ADMIN — ASPIRIN 81 MG CHEWABLE TABLET 81 MG: 81 TABLET CHEWABLE at 08:59

## 2022-05-06 RX ADMIN — SACUBITRIL AND VALSARTAN 1 TABLET: 49; 51 TABLET, FILM COATED ORAL at 08:59

## 2022-05-06 RX ADMIN — COLCHICINE 0.6 MG: 0.6 TABLET, FILM COATED ORAL at 08:59

## 2022-05-06 RX ADMIN — SUCRALFATE 1 G: 1 TABLET ORAL at 13:22

## 2022-05-06 RX ADMIN — BUMETANIDE 2 MG: 1 TABLET ORAL at 08:59

## 2022-05-06 RX ADMIN — POTASSIUM CHLORIDE 20 MEQ: 750 TABLET, EXTENDED RELEASE ORAL at 08:59

## 2022-05-06 RX ADMIN — DOCUSATE SODIUM 100 MG: 100 CAPSULE, LIQUID FILLED ORAL at 08:59

## 2022-05-06 RX ADMIN — SODIUM CHLORIDE, PRESERVATIVE FREE 10 ML: 5 INJECTION INTRAVENOUS at 06:43

## 2022-05-06 RX ADMIN — BACITRACIN 1 PACKET: 500 OINTMENT TOPICAL at 11:33

## 2022-05-06 NOTE — PROGRESS NOTES
[address] [facility] in [place], [facility] ([address] [facility]) and Caregiver Osvaldo Bello) were trained on the operation and care of the [facility] LVAD system and its components prior to discharge as described in the Patient Handbook and Instructions for use. They are compliant with required training and competent to care for the device at home including  exchange and dressing change.        The following equipment was provided to patient and recorded in the [facility] equipment log via \"[employer]\" prior to hospital discharge:    Mobile Power Unit   Universal Battery Charger  Total of EIGHT 14v Lithium Ion batteries  Total of FOUR Battery clips  2 Holster Vests   2 Shower bags  1 Travel Bag  Supplies for at least 4 dressing changes      Maddie Aleman RN  [facility] RN VAD Coordinator

## 2022-05-06 NOTE — PROGRESS NOTES
[address] [facility] in [place], [place]  Inpatient Progress Note      Patient name: Debi Dugan  Patient : 1945  Patient MRN: [id number]  Consulting MD: Makeda Ybarra MD  Date of service: 22      REASON FOR REFERRAL:  Management of chronic systolic heart failure, LVAD     PLAN OF CARE:  69 y.o. male with severe ischemic cardiomyopathy, LVEF 15-20% s/p HM3 LVAD implant as DT on 22 by Dr. Titi Reyes c/b intraoperative bleeding requiring multiple blood products and subsequent RV failure with severe TR requiring prolonged dobutamine wean   Pt reports that he \"does not want to go home attached to anything other than the LVAD\"; is not interested in [address] catheter or IV dobutamine at home; subsequently weaned   Plan for remainder of hospitalization includes:  Stable for D/C home today with [facility] SN/PT/OT and close AHF follow up, first appointment 5/10   As an outpatient, will need surveillance monthly echos to monitor TR, chest xrays to monitor pleural effusion, and aggressive PT/OT and nutritional support with weekly prealbumins and iron profiles    RECOMMENDATIONS:  POD 29  TTE 4 shows improvement in TR, TAPSE 1.2 cm   Continue speed 4800rpms, low speed 4400  Hemodynamically stable off of dobutamine   No BB due to RV failure   Continue Entresto 49/51mg BID  Cont Eplerenone 25mg daily  Continue Bumex 2 mg PO BID    Resume SGLT2i on D/C   Continue hydralazine 50 mg PO q8h   Continue amiodarone 200 mg PO daily x 2 weeks (last dose 5/10)   Continue warfarin, goal INR 2-3; 5 mg tonight   ASA 81mg daily  Continue bowel regimen   Weekly prealbumin and iron profile with  labs   Driveline dressing changes daily until discharge; suture to be removed at first follow up appointment    CPAP qHS as OP  Advanced care plan forms on file   Strict I/O, daily weights, Na+ restricted diet   Continue VAD education   Will ask Sleep medicine to help troubleshoot mask fitting as OP  D/C home today with [facility] SN/PT/OT     INTERVAL HISTORY:  Stable off dobutamine   Creatinine stable  PBNP stable  Hgb stable  No acute overnight events    IMPRESSION:  POD 29 S/p LVAD implant as DT  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  Persistent large volume chest tube output  Chronic systolic heart failure  Stage D, NYHA class IV symptoms  Non-ischemic cardiomyopathy, LVEF 20% with LVEDD 6.2  RV dysfunction, TAPSE 1.22 (prelimnary read)  TBili 1.5 likely from cardiac congestion  At risk of sudden cardiac death  Recent cardiac arrest   S/p ICD (1/2013, [employer] followed by [address] [place] [address]); New implant on 10/21/2021 [facility]  Coronary artery disease  S/p multiple interventions  S/p 4V CABG (8/2012)  [facility] (7/2019) severe stenosis of LIMA to LAD anastomosis site, SVG graft to OM is occluded, SVG graft to RCA 40-50%, LAD occluded proximally, severe proximal LCx, RCA is the long . PET (6/2019) EF 24% with anterior lateral and inferior reversible defect  Cardiac risk factors  HTN  HL  DM2  SRUTHI on CPAP  MIld carotid stenosis  Anemia, microcytic  Iron deficiency  DVT with filter  Pulmonary nodules   Dysphagia       LIFE GOALS:  Patient's personal goals include: getting stronger and going home soon  Important upcoming milestones or family events: The patient identifies the following as important for living well: being independent, being at home, enjoying family time        [facility]:  Echo 5/4/22 LVEF 15-20%, mod TR, LVIDd 5.3 cm, TAPSE 0.9 cm   Echo 4/26/22 - LVIDd 4.9 cm, TAPSE 0.5 cm,   Echo 4/18/22- LVEF 15-20%, severe TR, TAPSE 0.5cm, RVIDd 6cm   Echo (4/8/22)    Left Ventricle: Left ventricle size is normal. Moderately increased wall thickness. Findings consistent with concentric remodeling. Severe global hypokinesis present. Severely reduced left ventricular systolic function with a visually estimated EF of 10 -15%.  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 Aortic Valve: Moderate sclerosis of the aortic valve cusp. Mitral Valve: Moderately thickened leaflet. Mild transvalvular regurgitation. Tricuspid Valve: Severe transvalvular regurgitation. Right Atrium: Right atrium is severely dilated. Echo (3/2/22)   LV 10-15%  Mod-severe MR      Echo (11/23/21):  LV 15-20%. Mod-severe, MR, mod-TR     Echo (5/20/21)  LV: Estimated LVEF is 20 - 25%. Normal wall thickness. Mildly dilated left ventricle. Severely and globally reduced systolic function. Severe (grade 4) left ventricular diastolic dysfunction. LA: Severely dilated left atrium. RA: Severely dilated right atrium. MV: Moderate mitral valve regurgitation is present. TV: Moderate tricuspid valve regurgitation is present. AO: Mild aortic root dilatation. RV: Pacer/ICD present. LVED 6.2cm     EKG (5/20/21) SB with 1degree AV block, QRS 116ms     LHC (5/24/21) Native Coronaries: LM - moderate to severe distal disease 50%. % prox occluded (), heavily calcified, LCx: 80% proximal stenosis. OM1 is  (seen filling faintly via collaterals). OM2 99% stenosis. RCA: 100% proximal occluded. LIMA to LAD: patent, supplies only a diagonal branch (probably D2). The LAD distal to the bifurcation is 100% occluded () and fills via right to left collaterals off the SVG to RCA. SVG to R-PDA: patent with moderate diffuse irregularities but no obstructive disease in the graft. No appealing interventional targets. [address] Report 5/4/2022 5/2/2022 11/12/2021 7/6/2021 5/20/2021   (PRE) HR 82 82 88 98 74   (PRE) O2 Sat - 98 100 - 99   (POST) HR 91 88 102 - 81   (POST) O2 Sat - 98 99 98% on Ra 99   Distance in Meters 191.41 181.36 386. 62 - 1158.24         BRIEF HISTORY OF PRESENT ILLNESS:  Emile Olszewski is a 69 y.o. male with h/o HTN, HL, DM2, SRUTHI on CPAP, chronic systolic heart failure, stage D, NYHA class IV symptoms, non-ischemic cardiomyopathy, LVEF 20% with LVEDD 6.2, RV dysfunciton, TAPSE 1.22, TBili 1.5 likely from cardiac congestion, recent cardiac arrest s/p ICD (1/2013, [employer] followed by [facility]), coronary artery disease s/p multiple interventions s/p 4V CABG (8/2012), [facility] (7/2019) severe stenosis of LIMA to LAD anastomosis site, SVG graft to OM is occluded, SVG graft to RCA 40-50%, LAD occluded proximally, severe proximal LCx, RCA is the long . PET (6/2019) EF 24% with anterior lateral and inferior reversible defect. Anemia, microcytic with iron deficiency, DVT with filter. Patient was referred to [facility] by Dr. Cyndi Mo in 2021 for evaluation of his candidacy for advanced therapies. Patient agreed to inpatient initiation of palliative infusion of chronic inotropes and evaluation for LVAD-DT. Patient was admitted 5/2-5/25/21. Patient was started on milrinone infusion and had first part of LVAD evaluation completed. Right and left heart cath on 5/24/21 showed severe diffuse native vessel CAD, with patent grafts (LIMA to D2, SVG to RCA). Pt was found to have pulmonary nodule during workup. Antiplatelet therapy was held during hospitalization and after discharge due to anticipated pulmonary nodule biopsy, bone marrow biopsy and EGD/C-Scope as part of LVAD workup. Pt found to have adenocarcinoma of the lung, and so LVAD was deferred pending treatment. Patient completed radiation treatment for adenocarcinoma of the lung. Hem-onc cleared patient for VAD implant with 90% 2 year prognosis. He was most recently admitted for elective pre-op EGD and colonoscopy which he tolerated well; path negative for malignancy. He presented to [facility] on 4/3 for admission for planned LVAD implant. Underwent LVAD implant on 4/5/22. Was re-do sternotomy, bleeding intra-op, required cryo, k-centra, FFP, Plt, and cellsaver in OR. Had RV dysfunction noted intra-op; requiring lower VAD speed and dual inotrope support. Inotropic support was able to be decreased, but pt now remains on dobutamine at 6mcg/kg/min. Pt ans wife have been doing well with LVAD education. Pt has been OOB, is eating well and moving his bowels. Therapeutic on warfarin. REVIEW OF SYSTEMS:  Review of Systems   Constitutional: Positive for malaise/fatigue. Negative for chills, fever and weight loss. Respiratory: Negative for cough and shortness of breath. Cardiovascular: Negative for chest pain, palpitations, orthopnea and leg swelling. Gastrointestinal: Negative for abdominal pain, constipation, diarrhea, heartburn, nausea and vomiting. Neurological: Negative for dizziness and weakness. Psychiatric/Behavioral: Negative for depression. PHYSICAL EXAM:  Visit Vitals  /88 (BP 1 Location: Left upper arm, BP Patient Position: At rest)   Pulse 76   Temp 99.5 °F (37.5 °C)   Resp 16   Ht 6' 1\" (1.854 m)   Wt 168 lb 6.9 oz (76.4 kg)   SpO2 97%   BMI 22.22 kg/m²       LVAD INTERROGATION:  Device interrogated in person  Device function normal, normal flow, no events  LVAD   Pump Speed (RPM): 4800  Pump Flow (LPM): 3.7  MAP: 80  PI (Pulsitility Index): 6  Power: 3.2   Test: Yes  Back Up  at Bedside & Labeled: Yes  Power Module Test: Yes  Driveline Site Care: No  Driveline Dressing: Clean, Dry, and Intact  MAP in Therapeutic Range (Outpatient): Yes  Testing  Alarms Reviewed: Yes  Back up SC speed: 4800  Back up Low Speed Limit: 4400  Emergency Equipment with Patient?: Yes  Emergency procedures reviewed?: Yes  Drive line site inspected?: Yes  Drive line intergrity inspected?: Yes  Drive line dressing changed?: No      Physical Exam  Vitals and nursing note reviewed. Constitutional:       General: He is not in acute distress. Appearance: Normal appearance. Cardiovascular:      Rate and Rhythm: Normal rate and regular rhythm.       Comments: LVAD Hum noted on auscultation  Pulmonary:      Effort: Pulmonary effort is normal. No respiratory distress. Abdominal:      General: Bowel sounds are normal. There is no distension. Palpations: Abdomen is soft. Tenderness: There is no abdominal tenderness. Musculoskeletal:      Right lower leg: No edema. Left lower leg: No edema. Skin:     General: Skin is warm and dry. Capillary Refill: Capillary refill takes less than 2 seconds. Neurological:      General: No focal deficit present. Mental Status: He is alert and oriented to person, place, and time. Psychiatric:         Mood and Affect: Mood normal.         Behavior: Behavior normal.         Thought Content: Thought content normal.         Judgment: Judgment normal.              PAST MEDICAL HISTORY:  Past Medical History:   Diagnosis Date    CAD (coronary artery disease) '90  '97, '13 x 2    MI, code ice in 2013 at [facility]    Calculus of kidney     Colonic polyps     Congestive heart failure, unspecified     Diabetes ([facility] [address]     Gastritis     Hypercholesterolemia     Sleep apnea        PAST SURGICAL HISTORY:  Past Surgical History:   Procedure Laterality Date    COLONOSCOPY  04/06/2011    16, due 21    COLONOSCOPY N/A 3/2/2022    COLONOSCOPY    :- performed by Marlys Oreilly MD at [facility] ENDOSCOPY    ENDOSCOPY, COLON, DIAGNOSTIC      11, due 16    HX CORONARY ARTERY BYPASS GRAFT  8/22/12    x 4 vessel by S.  James    HX HEENT      LASIK    HX PACEMAKER PLACEMENT  1/30/13    LA CARDIAC SURG PROCEDURE UNLIST  2012    x 4 vessels       FAMILY HISTORY:  Family History   Problem Relation Age of Onset    Heart Disease Mother         MI    Heart Disease Father         CAD & PVD    Lung Disease Father     Cancer Father         lung    Diabetes Maternal Grandmother     Heart Disease Other         CAD    Stroke Sister        SOCIAL HISTORY:  Social History     Socioeconomic History    Marital status:    Tobacco Use    Smoking status: Never Smoker    Smokeless tobacco: Never Used   Vaping Use Vaping Use: Never used   Substance and Sexual Activity    Alcohol use: Yes     Alcohol/week: 0.0 standard drinks     Comment:  VERY RARE    Drug use: No       LABORATORY RESULTS:     Labs Latest Ref Rng & Units 5/6/2022 5/5/2022 5/4/2022 5/3/2022 5/2/2022 5/1/2022 4/30/2022   WBC 4.1 - 11.1 K/uL 6.0 6.0 6.5 5.3 5.7 5.5 5.6   RBC 4.10 - 5.70 M/uL 3.37(L) 3.19(L) 3.19(L) 3.19(L) 3.08(L) 2.92(L) 2.95(L)   Hemoglobin 12.1 - 17.0 g/dL 8.6(L) 8.2(L) 8.2(L) 8. 1(L) 7. 9(L) 7. 6(L) 7. 6(L)   Hematocrit 36.6 - 50.3 % 27. 6(L) 26. 0(L) 26. 4(L) 25. 7(L) 25. 8(L) 24. 7(L) 24. 6(L)   MCV 80.0 - 99.0 FL 81.9 81.5 82.8 80.6 83.8 84.6 83.4   Platelets 077 - 257 K/uL 358 326 293 315 298 274 275   Lymphocytes 12 - 49 % - - - - - - -   Monocytes 5 - 13 % - - - - - - -   Eosinophils 0 - 7 % - - - - - - -   Basophils 0 - 1 % - - - - - - -   Bands 0 - 6 % - - - - - - -   Albumin 3.5 - 5.0 g/dL 2. 9(L) 2. 9(L) 3.0(L) 2. 8(L) 2. 9(L) 2. 6(L) 2. 8(L)   Calcium 8.5 - 10.1 MG/DL 8.9 8.6 8.4(L) 8.6 8.2(L) 8.0(L) 7. 7(L)   Glucose 65 - 100 mg/dL 111(H) 100 166(H) 102(H) 95 199(H) 125(H)   BUN 6 - 20 MG/DL 25(H) 24(H) 20 21(H) 18 17 24(H)   Creatinine 0.70 - 1.30 MG/DL 0.93 0.97 0.92 0.86 0.86 0.86 1.01   Sodium 136 - 145 mmol/L 136 137 134(L) 136 138 133(L) 137   Potassium 3.5 - 5.1 mmol/L 3.7 3.5 3.8 3.6 3.6 3.7 4.0   TSH 0.36 - 3.74 uIU/mL - - - - - - -   LDH 85 - 241 U/L 172 172 170 175 163 177 158   Some recent data might be hidden     Lab Results   Component Value Date/Time    TSH 2.26 04/10/2022 03:00 AM    TSH 1.68 03/01/2022 11:50 AM    TSH 1.47 05/26/2021 10:44 PM    TSH 1.97 05/20/2021 04:28 PM    TSH 1.41 02/09/2021 03:05 PM    TSH 1.740 08/14/2018 09:58 AM    TSH 1.710 10/18/2017 12:00 AM       ALLERGY:  Allergies   Allergen Reactions    Oxycodone Anaphylaxis    Pcn [Penicillins] Hives        CURRENT MEDICATIONS:    Current Facility-Administered Medications:     bacitracin 500 unit/gram packet 1 Packet, 1 Packet, Topical, PRN, Marcela Beck MD potassium chloride SR (KLOR-CON 10) tablet 20 mEq, 20 mEq, Oral, BID, Polliard, Bravo Daily T, NP, 20 mEq at 05/06/22 0859    sacubitriL-valsartan (ENTRESTO) 49-51 mg tablet 1 Tablet, 1 Tablet, Oral, Q12H, Charisse Ba B, NP, 1 Tablet at 05/06/22 0859    magnesium oxide (MAG-OX) tablet 400 mg, 400 mg, Oral, DAILY, Polliard, Bravo Daily T, NP, 400 mg at 05/06/22 0859    bumetanide (BUMEX) tablet 2 mg, 2 mg, Oral, BID, Polliard, Stewart Becket, NP, 2 mg at 05/06/22 3879    amiodarone (CORDARONE) tablet 200 mg, 200 mg, Oral, DAILY, Polliard, Bravo Daily T, NP, 200 mg at 05/06/22 0859    sodium chloride (OCEAN) 0.65 % nasal squeeze bottle 2 Spray, 2 Spray, Both Nostrils, Q2H PRN, Terry, Danica B, NP    docusate sodium (COLACE) capsule 100 mg, 100 mg, Oral, DAILY, Terry, Danica B, NP, 100 mg at 05/06/22 0859    eplerenone (INSPRA) tablet 25 mg, 25 mg, Oral, DAILY, Terry, Danica B, NP, 25 mg at 05/06/22 0859    guaiFENesin (ROBITUSSIN) 100 mg/5 mL oral liquid 100 mg, 100 mg, Oral, Q4H PRN, Teofilo Miles MD, 100 mg at 05/03/22 2344    polyethylene glycol (MIRALAX) packet 17 g, 17 g, Oral, DAILY, Charisse Ba B, NP, 17 g at 05/03/22 0831    senna-docusate (PERICOLACE) 8.6-50 mg per tablet 1 Tablet, 1 Tablet, Oral, DAILY PRN, Charisse Ba B, NP, 1 Tablet at 04/24/22 0913    oxymetazoline (AFRIN) 0.05 % nasal spray 2 Spray, 2 Spray, Both Nostrils, BID PRN, Charisse Ba B, NP, 2 Spray at 04/24/22 1612    hydrALAZINE (APRESOLINE) tablet 50 mg, 50 mg, Oral, Q8H, Milile Wong TEARNESTINE, 50 mg at 05/06/22 0358    colchicine tablet 0.6 mg, 0.6 mg, Oral, DAILY, Lawrence Wong Daily T, EARNESTINE, 0.6 mg at 05/06/22 0859    alteplase (CATHFLO) 1 mg in sterile water (preservative free) 1 mL injection, 1 mg, InterCATHeter, PRN, Jose Palmer MD, 1 mg at 05/02/22 0041    bisacodyL (DULCOLAX) suppository 10 mg, 10 mg, Rectal, DAILY PRN, Judy García MD, 10 mg at 04/12/22 1533    famotidine (PEPCID) tablet 20 mg, 20 mg, Oral, Q12H, Ximena Real MD, 20 mg at 22 0859    mirtazapine (REMERON) tablet 7.5 mg, 7.5 mg, Oral, QHS, Terry, Danica B, NP, 7.5 mg at 22 2146    lidocaine 4 % patch 1 Patch, 1 Patch, TransDERmal, Q24H, Terry, Danica B, NP, 1 Patch at 22 1250    Warfarin NP/MD dosing, , Other, PRN, Terry, Danica B, NP    melatonin tablet 3 mg, 3 mg, Oral, QHS PRN, Ryanne Sanchez MD, 3 mg at 227    hydrALAZINE (APRESOLINE) 20 mg/mL injection 10 mg, 10 mg, IntraVENous, Q6H PRN, Ole Interiano B, NP, 10 mg at 22 0004    aspirin chewable tablet 81 mg, 81 mg, Oral, DAILY, Ole Interiano B, NP, 81 mg at 22 0859    hydrALAZINE (APRESOLINE) 20 mg/mL injection 5 mg, 5 mg, IntraVENous, Q6H PRN, Ole Interiano B, NP, 5 mg at 22 6901    sodium chloride (NS) flush 5-40 mL, 5-40 mL, IntraVENous, Q8H, Millie Wong NP, 10 mL at 22 0643    sodium chloride (NS) flush 5-40 mL, 5-40 mL, IntraVENous, PRN, Tracy Wong NP, 10 mL at 22 0914    0.9% sodium chloride infusion, 9 mL/hr, IntraVENous, CONTINUOUS, Larissa Wong NP, Last Rate: 3 mL/hr at 22 0513, 3 mL/hr at 22 0513    acetaminophen (TYLENOL) tablet 650 mg, 650 mg, Oral, Q6H PRN, Larissa Wong NP, 650 mg at 22 0834    naloxone (NARCAN) injection 0.4 mg, 0.4 mg, IntraVENous, PRN, Larissa Wong NP    ondansetron (ZOFRAN) injection 4 mg, 4 mg, IntraVENous, Q4H PRN, Tracy Wong, NP, 4 mg at 22 2139    albuterol-ipratropium (DUO-NEB) 2.5 MG-0.5 MG/3 ML, 3 mL, Nebulization, Q6H PRN, Tracy Wong T, NP    glucose chewable tablet 16 g, 4 Tablet, Oral, PRN, Tracy Wong [place] T, NP    glucagon (GLUCAGEN) injection 1 mg, 1 mg, IntraMUSCular, PRN, Tracy Wong [place] T, NP    insulin lispro (HUMALOG) injection, , SubCUTAneous, AC&HS, Tracy Wong, NP, 2 Units at 22 1148    sucralfate (CARAFATE) tablet 1 g, 1 g, Oral, AC&HS, Larissa Wong, NP, 1 g at 22 9448 0.45% sodium chloride infusion, 10 mL/hr, IntraVENous, CONTINUOUS, Jf Najera MD, Stopped at 04/10/22 0730    fentaNYL citrate (PF) injection 25 mcg, 25 mcg, IntraVENous, Q2H PRN, Pat Wong NP, 25 mcg at 22 2151    PATIENT CARE TEAM:  Patient Care Team:  Orlin Sandhu MD as PCP - General (Internal Medicine Physician)  Orlin Sandhu MD as PCP - [facility]  Fredo Schulte MD as Physician (Cardiovascular Disease Physician)  Kristopher Leon MD as Physician (Gastroenterology)  Elliot Hwang MD as Physician (Orthopedic Surgery)  Karina Mcleod MD as Physician (Ophthalmology)  Quique Johnson MD (Cardiovascular Disease Physician)  Judy García MD (Cardiovascular Disease Physician)     Thank you for allowing us to participate in this patient's care. Ceci Morrow NP  [address] [place] [address], [address]  Phone: [phone]      [facility] ATTENDING ADDENDUM    Patient was seen and examined in person. Data and notes were reviewed. I have discussed and agree with the plan as noted in the NP note above without further additions.     Linda Santana MD PhD  [facility] Mccloud

## 2022-05-06 NOTE — PROGRESS NOTES
Transitions of Care Plan   RUR: 20% - high   Clinical Update: stable for discharge; LVAD   Consults: Therapy   Baseline: independent without DME; resides w wife   Barriers to Discharge: none   Disposition: home health - [address] Estimated Discharge Date: 5/6/22    CM spoke with [facility] NP. Patient medically stable for hospital discharge today. Medicare pt has received, reviewed, and signed 2nd IM letter informing them of their right to appeal the discharge. Signed copied has been placed on pt bedside chart. Disposition:  Home Health:  [address]  Transportation: Wife    Lynsey Whittaker, MPH  Care Manager l [employer]  Available via [employer] [facility] or

## 2022-05-06 NOTE — PROGRESS NOTES
1930: Bedside shift change report given to [facility] (oncoming nurse) by Nay Mack (offgoing nurse). Report included the following information SBAR, Kardex, Intake/Output, MAR, and Recent Results. 4600: pt ambulated to bathroom and complete CHG bath with minimal assistance. Stood at sink for 10 minutes. 0730: Bedside shift change report given to Chris Castle (oncoming nurse) by Toñito Amato RN (offgoing nurse). Report included the following information SBAR, Kardex, Intake/Output, MAR and Recent Results. Problem: Falls - Risk of  Goal: *Absence of Falls  Description: Document [facility] Fall Risk and appropriate interventions in the flowsheet.   Outcome: Progressing Towards Goal  Note: Fall Risk Interventions:  Mobility Interventions: Patient to call before getting OOB,Bed/chair exit alarm         Medication Interventions: Patient to call before getting OOB,Teach patient to arise slowly,Bed/chair exit alarm    Elimination Interventions: Bed/chair exit alarm,Call light in reach      Problem: LVAD Post-op Day 15 to Discharge  Goal: Activity/Safety  Outcome: Progressing Towards Goal  Goal: Medications  Outcome: Progressing Towards Goal  Goal: Respiratory  Outcome: Progressing Towards Goal

## 2022-05-06 NOTE — DISCHARGE INSTRUCTIONS
[address] [address]                                                  [phone]      Name: Hue Rae Sr    Surgery & Date: Procedure(s):  REDO STERNOTOMY; RIGHT GROIN CUTDOWN FOR CANNULATION;  INSERTION OF LEFT VENTRICULAR ASSIST DEVICE (HMIII); AORTIC VALVE CLOSURE CHEL BY DR. Gordan Snellen. Discharge Date: 05/06/22    MEDICATIONS:  Please see your After Visit Summary for a list of medications. INR TARGET- 2-3  INR LEVEL to be drawn- Saturday, May 7th by Home Health Nurse   INR Mangement per the [facility] ([phone])    INSTRUCTIONS:  1. NO SMOKING  2. Follow all the instructions in your discharge book given to you by the VAD Coordinator. 3. Clean all incisions, except your driveline site, with mild soap and water twice daily. 4. Change your driveline dressing Monday, Wednesday and Friday, as directed. 5. Call the VAD Coordinator immediately for any redness, swelling, or drainage from your incision or your driveline site. 6. Take your temperature daily and call for a temperature of 99 degrees or higher, or for any symptoms that make you think you have an infection. 7. Weigh yourself each morning. Call if you gain more than 2 pounds in a one day period or 5 pounds in a one week period. 8. Use the incentive spirometer 10-12 breaths every hour while you are awake for the first month after surgery. Use a pillow or your bear to splint your breastbone when coughing or sneezing. 9. If you feel your breast bone clicking or popping, notify the office immediately. DIET  If you are having trouble with your appetite, eat what you can. Try eating small, frequent meals throughout the day. Eat an American Heart Association diet.     ACTIVITY  1. NO DRIVING--you will be evaluated to drive at your follow up visit.  2. Increase your activity (walking) each day. Stay out of bed most of the day. 3. When sitting, keep your legs elevated. 4. You may ride in a car, but you must get out every hour and walk around. If you ride in the front seat put the seat ALL the way back or ride in the back seat. 5. NO LIFTING MORE THAN 5 POUND FOR 1 MONTH, THEN IF CLEARED AT YOUR POST OP VISIT, YOU CAN IN INCREASE TO NO MORE THAN 10-20 LBS FOR THE NEXT 2 MONTHS. FOLLOW UP  1. If you have any questions or concerns about your LVAD or health, please contact the VAD Coordinator:  a. During office hours, call [phone]  b. After hours or on weekends, please page [phone]  2. Your first follow up appointment will be on Tuesday, May 10th at the [place] [address]. The office is located at [address] [place] [address], [facility] [address] [place] [address]. Please call [phone] if you can not make this appointment. 3. Please contact your primary care physician to make sure your pneumococcal vaccine and flu vaccines are up to date. 4. PLEASE bring a list of all medications you are currently taking with you to your follow up appointments. Signature:___________________________________________________        COUMADIN (warfarin) INSTRUCTIONS:   Take 5 mg (2 tablets) tonight. Your Home Health Nurse, Fely, will check your INR tomorrow and report it to the Heart Failure NP. Please do not take any Coumadin tomorrow until you hear from our office.

## 2022-05-06 NOTE — DISCHARGE SUMMARY
[facility] Discharge Summary     Patient ID:  Aimee Mackenzie  [id number]  34 y.o.  1945    Admit date: 4/3/2022    Discharge date: 5/6/2022     Admitting Physician: Yonny Grider MD     Referring Cardiologist:  Dr. Felicity Joseph     PCP:  Amaris Morel MD    Admitting Diagnoses: HFrEF, NYHA Class IV     Discharge Diagnoses: HFrEF s/p HM 3 implant as DT, improved to NYHA Class II    Hospital Problems  Date Reviewed: 3/17/2022          Codes Class Noted POA    S/P ventricular assist device [facility]) ICD-10-CM: Z95.811  ICD-9-CM: V45.89  4/5/2022 Unknown    Overview Signed 4/5/2022  2:22 PM by YOLI Crawley     REDO STERNOTOMY   RIGHT GROIN CUTDOWN FOR CANNULATION with access of CVF and CFA  INSERTION OF LEFT VENTRICULAR ASSIST DEVICE ([address] [place] [address])  AORTIC VALVE CLOSURE with Park's stitch             [facility] with [facility] ICD-10-CM: I50.20  ICD-9-CM: 428.20  4/3/2022 Unknown        Severe protein-calorie malnutrition ([facility] ICD-10-CM: Z98  ICD-9-CM: 959  5/21/2021 Unknown              Discharged Condition: improved    Disposition: home, see patient instructions for treatment and plan    Procedures for this admission:  Procedure(s):  REDO STERNOTOMY; RIGHT GROIN CUTDOWN FOR CANNULATION;  INSERTION OF LEFT VENTRICULAR ASSIST DEVICE (HMIII); AORTIC VALVE CLOSURE CHEL BY DR. Amy Kellogg. Discharge Medications:      My Medications      START taking these medications      Instructions Each Dose to Equal Morning Noon Evening Bedtime   amiodarone 200 mg tablet  Commonly known as: CORDARONE  Start taking on: May 7, 2022    Your last dose was: Your next dose is: Take 1 Tablet by mouth daily for 4 doses. 200 mg                 docusate sodium 100 mg capsule  Commonly known as: COLACE  Start taking on: May 7, 2022    Your last dose was: Your next dose is: Take 1 Capsule by mouth daily for 90 days.    100 mg                 famotidine 20 mg tablet  Commonly known as: PEPCID    Your last dose was: Your next dose is: Take 1 Tablet by mouth every twelve (12) hours. 20 mg                 magnesium oxide 400 mg tablet  Commonly known as: MAG-OX  Start taking on: May 7, 2022    Your last dose was: Your next dose is: Take 1 Tablet by mouth daily. 400 mg                 mirtazapine 7.5 mg tablet  Commonly known as: REMERON    Your last dose was: Your next dose is: Take 1 Tablet by mouth nightly. 7.5 mg                 sacubitriL-valsartan 49-51 mg Tab tablet  Commonly known as: ENTRESTO    Your last dose was: Your next dose is: Take 1 Tablet by mouth every twelve (12) hours. 1 Tablet                 sucralfate 1 gram tablet  Commonly known as: CARAFATE    Your last dose was: Your next dose is: Take 1 Tablet by mouth Before breakfast, lunch, dinner and at bedtime. 1 g                 warfarin 2.5 mg tablet  Commonly known as: COUMADIN    Your last dose was: Your next dose is: Take 2 Tablets by mouth daily. 5 mg                    CHANGE how you take these medications      Instructions Each Dose to Equal Morning Noon Evening Bedtime   bumetanide 2 mg tablet  Commonly known as: BUMEX  What changed: additional instructions    Your last dose was: Your next dose is: Take 1 Tablet by mouth two (2) times a day. 2 mg                 eplerenone 50 mg Tab tablet  Commonly known as: INSPRA  What changed: how much to take    Your last dose was: Your next dose is: Take 0.5 Tablets by mouth daily. 25 mg                    CONTINUE taking these medications      Instructions Each Dose to Equal Morning Noon Evening Bedtime   aspirin delayed-release 81 mg tablet    Your last dose was: Your next dose is: Take 81 mg by mouth daily. 81 mg                 dapagliflozin 10 mg Tab tablet  Commonly known as: [place]    Your last dose was: Your next dose is:          Take 1 Tablet by mouth daily. 10 mg                 hydrALAZINE 50 mg tablet  Commonly known as: APRESOLINE    Your last dose was: Your next dose is: Take 1 Tablet by mouth three (3) times daily. 50 mg                 metFORMIN 500 mg tablet  Commonly known as: GLUCOPHAGE    Your last dose was: Your next dose is: Take 1 Tablet by mouth two (2) times daily (with meals). 500 mg                 potassium chloride SR 20 mEq tablet  Commonly known as: K-TAB    Your last dose was: Your next dose is: Take 2 Tablets by mouth two (2) times a day. 40 mEq                 Tylenol Extra Strength 500 mg tablet  Generic drug: acetaminophen    Your last dose was: Your next dose is: Take  by mouth every six (6) hours as needed.                      STOP taking these medications    Benadryl Allergy 25 mg tablet  Generic drug: diphenhydrAMINE        benzonatate 200 mg capsule  Commonly known as: TESSALON        Effient 10 mg tablet  Generic drug: prasugreL        isosorbide mononitrate ER 30 mg tablet  Commonly known as: IMDUR        lancets Misc        milrinone 20 mg/100 mL (200 mcg/mL) infusion  Commonly known as: PRIMACOR        omeprazole 20 mg capsule  Commonly known as: PRILOSEC        OneTouch Delica Plus Lancet 33 gauge Misc  Generic drug: lancets        OneTouch Ultra Test strip  Generic drug: glucose blood VI test strips        ranolazine  mg SR tablet  Commonly known as: Ranexa        rosuvastatin 10 mg tablet  Commonly known as: CRESTOR        tamsulosin 0.4 mg capsule  Commonly known as: FLOMAX              Where to Get Your Medications      These medications were sent to [address] [place] [address] [address], [address] [facility] [address]    Phone: [phone]   · amiodarone 200 mg tablet  · bumetanide 2 mg tablet  · docusate sodium 100 mg capsule  · eplerenone 50 mg Tab tablet  · famotidine 20 mg tablet  · hydrALAZINE 50 mg tablet  · magnesium oxide 400 mg tablet  · mirtazapine 7.5 mg tablet  · sacubitriL-valsartan 49-51 mg Tab tablet  · sucralfate 1 gram tablet  · warfarin 2.5 mg tablet         INR TARGET - 2-3  INR LEVEL to be drawn Saturday, May 7th 2022   INR management per [facility]    HPI:  Jarrod Tamez is a 69 y.o. male with h/o HTN, HL, DM2, SRUTHI on CPAP, chronic systolic heart failure, stage D, NYHA class IV symptoms, non-ischemic cardiomyopathy, LVEF 20% with LVEDD 6.2, RV dysfunciton, TAPSE 1.22, TBili 1.5 likely from cardiac congestion, recent cardiac arrest s/p ICD (1/2013, [place] [facility] followed by [facility]), coronary artery disease s/p multiple interventions s/p 4V CABG (8/2012), LHC (7/2019) severe stenosis of LIMA to LAD anastomosis site, SVG graft to OM is occluded, SVG graft to RCA 40-50%, LAD occluded proximally, severe proximal LCx, RCA is the long . PET (6/2019) EF 24% with anterior lateral and inferior reversible defect. Anemia, microcytic with iron deficiency, DVT with filter. He was supported as an outpatient on milrinone infusion, and presented to [facility] on 4/3 for elective LVAD implant following approval by [facility]. As a re-do sternotomy, he experienced   bleeding intra-op, required cryo, k-centra, FFP, Plt, and cellsaver in OR.  Had RV dysfunction noted intra-op; requiring lower VAD speed and dual inotrope support.  Inotropic support was slowly weaned off. He is being discharged on ARNI and MRA: no BB due to RV failure. He has NYHA Class II symptoms on D/C. He will be receiving HH SN/PT/OT.      Remainder of plan as follows:      RECOMMENDATIONS:  POD 29  TTE 5/4 shows improvement in TR, TAPSE 1.2 cm   Continue speed 4800rpms, low speed 4400  Hemodynamically stable off of dobutamine   No BB due to RV failure   Continue Entresto 49/51mg BID  Cont Eplerenone 25mg daily  Continue Bumex 2 mg PO BID    Resume SGLT2i on D/C   Continue hydralazine 50 mg PO q8h   Continue amiodarone 200 mg PO daily x 2 weeks (last dose 5/10)   Continue warfarin, goal INR 2-3; 5 mg tonight   ASA 81mg daily  Continue bowel regimen   Weekly prealbumin and iron profile with  labs   Driveline dressing changes daily until discharge; suture to be removed at first follow up appointment    CPAP qHS as OP  Advanced care plan forms on file   Strict I/O, daily weights, Na+ restricted diet   Continue VAD education   Will ask Sleep medicine to help troubleshoot mask fitting as OP  D/C home today with [facility] SN/PT/OT       Discharge Vital Signs:   Visit Vitals  /88 (BP 1 Location: Left upper arm, BP Patient Position: At rest)   Pulse 78   Temp 98.3 °F (36.8 °C)   Resp 18   Ht 6' 1\" (1.854 m)   Wt 168 lb 6.9 oz (76.4 kg)   SpO2 99%   BMI 22.22 kg/m²       Labs:   Recent Labs     05/06/22  1149 05/06/22  0643 05/06/22  0354   WBC  --   --  6.0   HGB  --   --  8.6*   HCT  --   --  27.6*   PLT  --   --  358   NA  --   --  136   K  --   --  3.7   BUN  --   --  25*   CREA  --   --  0.93   GLU  --   --  111*   GLUCPOC 219*   < >  --    INR  --   --  2.6*    < > = values in this interval not displayed. Diagnostics: please see EMR. Patient Instructions/Follow Up Care:  Discharge instructions were reviewed with the patient and family present. Questions were also answered at this time. Prescriptions and medications were reviewed. The patient has a follow up appointment with the Nurse Practitioner on Tuesday, May 10th. The patient was also instructed to follow up with his primary care physician as needed. The patient and family were encouraged to call with any questions or concerns. Signed:  Arturo Zamudio NP  5/6/2022  1:19 PM     [facility] ATTENDING ADDENDUM    Patient was seen and examined in person. Data and notes were reviewed. I have discussed and agree with the plan as noted in the NP note above without further additions.     Mukund Molina MD PhD  Jason Mcneill

## 2022-05-06 NOTE — PROGRESS NOTES
0730: Bedside and Verbal shift change report given to Dylon Forde RN (oncoming nurse) by Vipul Perez RN (offgoing nurse). Report included the following information SBAR, Kardex, Intake/Output, MAR, Recent Results, and Cardiac Rhythm Sinus Arrhythmia .     0900: Patient requesting not to get up and walk at this time. Due to patient wanting to rest prior to going home. 1000: Per Jozef Haney NP pull PICC line. Patient okay to have no IV access per Jozef Haney NP.    1125: PICC removed at this time per Jozef Haney NP order. LVAD dressing change by wife at this time with no assistance. 1235: Final pacer wire cut at this time by [facility]. Per [facility] no bedrest or required wait time prior to discharge. 1304: Patient walked with PT and placed in recliner at this time. 1316: Patient successfully paged the emergency LVAD number at this time. 1424: I have reviewed discharge instructions with the patient and spouse. The patient and spouse verbalized understanding and state no questions at this time. Discharge medications reviewed with patient and spouse and appropriate educational materials and side effects teaching were provided. 1437: Patient taken down for discharge at this time. Patient continues to state no questions. Patient discharged with all LVAD equipment. Care Plans:   Problem: Falls - Risk of  Goal: *Absence of Falls  Description: Document Gabriel Fall Risk and appropriate interventions in the flowsheet.   Outcome: Progressing Towards Goal  Note: Fall Risk Interventions:  Mobility Interventions: Patient to call before getting OOB,Bed/chair exit alarm         Medication Interventions: Patient to call before getting OOB,Teach patient to arise slowly,Bed/chair exit alarm    Elimination Interventions: Bed/chair exit alarm,Call light in reach              Problem: Patient Education: Go to Patient Education Activity  Goal: Patient/Family Education  Outcome: Progressing Towards Goal     Problem: Diabetes Self-Management  Goal: *Disease process and treatment process  Description: Define diabetes and identify own type of diabetes; list 3 options for treating diabetes. Outcome: Progressing Towards Goal  Goal: *Incorporating nutritional management into lifestyle  Description: Describe effect of type, amount and timing of food on blood glucose; list 3 methods for planning meals. Outcome: Progressing Towards Goal  Goal: *Incorporating physical activity into lifestyle  Description: State effect of exercise on blood glucose levels. Outcome: Progressing Towards Goal  Goal: *Developing strategies to promote health/change behavior  Description: Define the ABC's of diabetes; identify appropriate screenings, schedule and personal plan for screenings. Outcome: Progressing Towards Goal  Goal: *Using medications safely  Description: State effect of diabetes medications on diabetes; name diabetes medication taking, action and side effects. Outcome: Progressing Towards Goal  Goal: *Monitoring blood glucose, interpreting and using results  Description: Identify recommended blood glucose targets  and personal targets. Outcome: Progressing Towards Goal  Goal: *Prevention, detection, treatment of acute complications  Description: List symptoms of hyper- and hypoglycemia; describe how to treat low blood sugar and actions for lowering  high blood glucose level. Outcome: Progressing Towards Goal  Goal: *Prevention, detection and treatment of chronic complications  Description: Define the natural course of diabetes and describe the relationship of blood glucose levels to long term complications of diabetes.   Outcome: Progressing Towards Goal  Goal: *Developing strategies to address psychosocial issues  Description: Describe feelings about living with diabetes; identify support needed and support network  Outcome: Progressing Towards Goal  Goal: *Insulin pump training  Outcome: Progressing Towards Goal  Goal: *Sick day guidelines  Outcome: Progressing Towards Goal  Goal: *Patient Specific Goal (EDIT GOAL, INSERT TEXT)  Outcome: Progressing Towards Goal     Problem: Patient Education: Go to Patient Education Activity  Goal: Patient/Family Education  Outcome: Progressing Towards Goal     Problem: Pressure Injury - Risk of  Goal: *Prevention of pressure injury  Description: Document Jl Scale and appropriate interventions in the flowsheet.   Outcome: Progressing Towards Goal  Note: Pressure Injury Interventions:  Sensory Interventions: Assess changes in LOC,Keep linens dry and wrinkle-free,Maintain/enhance activity level,Discuss PT/OT consult with provider,Monitor skin under medical devices    Moisture Interventions: Offer toileting Q_hr,Absorbent underpads    Activity Interventions: Increase time out of bed,Pressure redistribution bed/mattress(bed type)    Mobility Interventions: Pressure redistribution bed/mattress (bed type)    Nutrition Interventions: Document food/fluid/supplement intake    Friction and Shear Interventions: Apply protective barrier, creams and emollients,Lift sheet,HOB 30 degrees or less                Problem: Patient Education: Go to Patient Education Activity  Goal: Patient/Family Education  Outcome: Progressing Towards Goal     Problem: Patient Education: Go to Patient Education Activity  Goal: Patient/Family Education  Outcome: Progressing Towards Goal     Problem: Patient Education: Go to Patient Education Activity  Goal: Patient/Family Education  Outcome: Progressing Towards Goal     Problem: Patient Education: Go to Patient Education Activity  Goal: Patient/Family Education  Outcome: Progressing Towards Goal     Problem: Heart Failure: Day 3  Goal: Off Pathway (Use only if patient is Off Pathway)  Outcome: Progressing Towards Goal  Goal: Activity/Safety  Outcome: Progressing Towards Goal  Goal: Diagnostic Test/Procedures  Outcome: Progressing Towards Goal  Goal: Nutrition/Diet  Outcome: Progressing Towards Goal  Goal: Discharge Planning  Outcome: Progressing Towards Goal  Goal: Medications  Outcome: Progressing Towards Goal  Goal: Respiratory  Outcome: Progressing Towards Goal  Goal: Treatments/Interventions/Procedures  Outcome: Progressing Towards Goal  Goal: Psychosocial  Outcome: Progressing Towards Goal  Goal: *Oxygen saturation within defined limits  Outcome: Progressing Towards Goal  Goal: *Hemodynamically stable  Outcome: Progressing Towards Goal  Goal: *Optimal pain control at patient's stated goal  Outcome: Progressing Towards Goal  Goal: *Anxiety reduced or absent  Outcome: Progressing Towards Goal  Goal: *Demonstrates progressive activity  Outcome: Progressing Towards Goal

## 2022-05-06 NOTE — PROGRESS NOTES
Problem: Mobility Impaired (Adult and Pediatric)  Goal: *Acute Goals and Plan of Care (Insert Text)  Description: FUNCTIONAL STATUS PRIOR TO ADMISSION: Patient was independent and active without use of DME.    HOME SUPPORT PRIOR TO ADMISSION: The patient lived with his wife but did not require assist.    Physical Therapy Goals- Continue goals with exception of #7 for next 7 days 4/22/2022, continue all goals 4/29/2022    Weekly Reassessment 4/15/2022 (goals progressed)  1. Patient will move from supine to sit and sit to supine , scoot up and down, and roll side to side in bed with head of bed flat with supervision/set-up within 7 days. 2.  Patient will perform sit to/from stand from chair height with supervision/set-up within 7 days. 3.  Patient will ambulate 300 feet stand by assist within 7 days. 4.  Patient will ascend/descend 6 stairs with handrail(s) with minimal assistance/contact guard assist within 14 days. 5.  Patient will perform cardiac exercises per protocol with supervision/set-up within 7 days. 6.  Patient will verbally and functionally recall mindful movement precautions within 7 days. 7.  Patient will perform power exchange for power module to/from battery with stand by assist within 7 days. -MET currently independent 4/22/2022    Initiated 4/8/2022  1. Patient will move from supine to sit and sit to supine , scoot up and down, and roll side to side in bed with supervision/set-up within 7 days. 2.  Patient will perform sit to/from stand with supervision/set-up within 7 days. 3.  Patient will ambulate 50 feet with least restrictive assistive device and minimal assistance/contact guard assist within 7 days. 4.  Patient will ascend/descend 6 stairs with handrail(s) with minimal assistance/contact guard assist within 14 days. 5.  Patient will perform cardiac exercises per protocol with supervision/set-up within 7 days.   6.  Patient will verbally and functionally recall 3/3 sternal precautions within 7 days. 7.  Patient will perform power exchange for power module to/from battery with minimal assistance within 7 days. Outcome: Progressing Towards Goal      PHYSICAL THERAPY TREATMENT  Patient: Beverly Colindres (89 y.o. male)  Date: 5/6/2022  Diagnosis: HFrEF (heart failure with reduced ejection fraction) ([facility] [address] [I50.20] <principal problem not specified>  Procedure(s) (LRB):  REDO STERNOTOMY; RIGHT GROIN CUTDOWN FOR CANNULATION;  INSERTION OF LEFT VENTRICULAR ASSIST DEVICE (HMIII); AORTIC VALVE CLOSURE CHEL BY DR. Ifeanyi Kauffman (N/A) 31 Days Post-Op  Precautions: Fall (mindful movement, LVAD )  Chart, physical therapy assessment, plan of care and goals were reviewed. ASSESSMENT  Patient continues with skilled PT services and is progressing towards goals. Pt has scheduled discharge today. Pt was able to change over to batteries and get dressed. Pt was able to perform steps for home entry with no concerns. Limited gait distance in preparation to for discharge and energy conservation      Current Level of Function Impacting Discharge (mobility/balance): SBA    Other factors to consider for discharge: LVAD         PLAN :  Patient continues to benefit from skilled intervention to address the above impairments. Continue treatment per established plan of care. to address goals. Recommendation for discharge: (in order for the patient to meet his/her long term goals)  Physical therapy at least 2 days/week in the home AND ensure assist and/or supervision for safety with LVAD management     This discharge recommendation:  Has not yet been discussed the attending provider and/or case management    IF patient discharges home will need the following DME: none       SUBJECTIVE:   Patient stated I am ready.     OBJECTIVE DATA SUMMARY:       Critical Behavior:  Neurologic State: Alert  Orientation Level: Oriented X4  Cognition: Appropriate decision making,Appropriate for age attention/concentration,Appropriate safety awareness,Follows commands     Functional Mobility Training:  Bed Mobility:     Supine to Sit: Modified independent              Transfers:  Sit to Stand: Modified independent  Stand to Sit: Modified independent                             Balance:  Sitting: Intact  Standing: Impaired  Standing - Static: Good  Standing - Dynamic : Good  Ambulation/Gait Training:  Distance (ft): 150 Feet (ft)     Ambulation - Level of Assistance: Stand-by assistance        Gait Abnormalities: Decreased step clearance        Base of Support: Center of gravity altered        Step Length: Left shortened;Right shortened                    Stairs:  Number of Stairs Trained: 6  Stairs - Level of Assistance: Contact guard assistance   Rail Use: Both    VAD power transition  Patient performed switchover from power module to battery. Completed the following tasks:   Independent Verbal cues Physical assist Comments   Don holster vest x   Pt used fishing vest    Check batteries x      Check        Ensure  clipped onto stabilization belt x      Position batteries in clips x      Disconnect power leads x      Insert power lead into battery x      [place] batteries in holster  x      Secure batteries with velcro and clips x        Patient performed switchover from battery to power module.   Completed the following tasks:   Independent Verbal cues Physical assistance Comments   Remove batteries from holster       Disconnect power leads from battery       Reconnect power leads into power module       Remove batteries from clips       Doff holster vest         Therapeutic Exercises:       Pain Rating:  none    Activity Tolerance:   requires rest breaks      After treatment patient left in no apparent distress:   Sitting in chair, Call bell within reach, and Caregiver / family present    COMMUNICATION/COLLABORATION:   The patients plan of care was discussed with: Registered nurse.     Yefri Hensley, PTA   Time Calculation: 26 mins

## 2022-05-07 ENCOUNTER — TELEPHONE (OUTPATIENT)
Dept: CARDIOLOGY CLINIC | Age: 77
End: 2022-05-07

## 2022-05-07 ENCOUNTER — HOME CARE VISIT (OUTPATIENT)
Dept: SCHEDULING | Facility: HOME HEALTH | Age: 77
End: 2022-05-07
Payer: MEDICARE

## 2022-05-07 VITALS — WEIGHT: 165 LBS | HEIGHT: 74 IN | TEMPERATURE: 98.4 F | RESPIRATION RATE: 16 BRPM | BODY MASS INDEX: 21.17 KG/M2

## 2022-05-07 PROCEDURE — G0299 HHS/HOSPICE OF RN EA 15 MIN: HCPCS

## 2022-05-07 PROCEDURE — 400013 HH SOC

## 2022-05-07 NOTE — TELEPHONE ENCOUNTER
Received a call from Mrs. Oconnell, needed refill for [employer]. Sent to local pharmacy on file.

## 2022-05-09 ENCOUNTER — TELEPHONE (OUTPATIENT)
Dept: CARDIOLOGY CLINIC | Age: 77
End: 2022-05-09

## 2022-05-09 ENCOUNTER — HOME CARE VISIT (OUTPATIENT)
Dept: SCHEDULING | Facility: HOME HEALTH | Age: 77
End: 2022-05-09
Payer: MEDICARE

## 2022-05-09 ENCOUNTER — TELEPHONE ANTICOAG (OUTPATIENT)
Dept: CARDIOLOGY CLINIC | Age: 77
End: 2022-05-09

## 2022-05-09 VITALS — HEART RATE: 55 BPM | OXYGEN SATURATION: 99 % | TEMPERATURE: 97.4 F

## 2022-05-09 LAB — INR, EXTERNAL: 2.9

## 2022-05-09 PROCEDURE — G0299 HHS/HOSPICE OF RN EA 15 MIN: HCPCS

## 2022-05-09 PROCEDURE — G0151 HHCP-SERV OF PT,EA 15 MIN: HCPCS

## 2022-05-09 NOTE — TELEPHONE ENCOUNTER
Telephone Call RE:  Appointment reminder     Outcome:     [x] Patient confirmed appointment   [] Patient rescheduled appointment for    [] Unable to reach  [] Left message             [] Other:     ---------------------             [x] Screened for [facility]

## 2022-05-09 NOTE — TELEPHONE ENCOUNTER
Called to check in with patient and to make change to appointment per patient's wife request. Herb Ro RN [facility]) visiting with patient.

## 2022-05-09 NOTE — TELEPHONE ENCOUNTER
Per Mariella Robles RN with [facility] patient's weight down to 163lbs today with MAP of 70. Patient reported he has not been drinking much per Mariella Robles RN. Reviewed with ALICIA Wong NP who recommended patient increase fluid intake, continue current regimen, and will recheck full set of labs next week. Contacted patient's wife Noni Escobar to notify. Discussed the importance of adequate fluid intake with primarily water. Educated Noni Escobar patient should have a goal of about 6-8 eight ounce glasses of fluid, primarily water, daily. Requested patient contact VAD coordinator if he begins to experience symptoms of dizziness/lightheadedness. She verbalized understanding and had no further questions. John Choi RN.

## 2022-05-10 ENCOUNTER — HOSPITAL ENCOUNTER (OUTPATIENT)
Dept: GENERAL RADIOLOGY | Age: 77
Discharge: HOME | End: 2022-05-10
Payer: MEDICARE

## 2022-05-10 ENCOUNTER — TELEPHONE (OUTPATIENT)
Dept: CARDIOLOGY CLINIC | Age: 77
End: 2022-05-10

## 2022-05-10 ENCOUNTER — OFFICE VISIT (OUTPATIENT)
Dept: CARDIOLOGY CLINIC | Age: 77
End: 2022-05-10
Payer: MEDICARE

## 2022-05-10 VITALS
BODY MASS INDEX: 21.66 KG/M2 | RESPIRATION RATE: 18 BRPM | WEIGHT: 166.4 LBS | TEMPERATURE: 98.3 F | SYSTOLIC BLOOD PRESSURE: 66 MMHG

## 2022-05-10 VITALS
OXYGEN SATURATION: 95 % | BODY MASS INDEX: 21.21 KG/M2 | WEIGHT: 163 LBS | RESPIRATION RATE: 20 BRPM | TEMPERATURE: 97.2 F

## 2022-05-10 DIAGNOSIS — E11.51 TYPE 2 DIABETES, CONTROLLED, WITH PERIPHERAL CIRCULATORY DISORDER (HCC): ICD-10-CM

## 2022-05-10 DIAGNOSIS — I50.9 CHRONIC HEART FAILURE, UNSPECIFIED HEART FAILURE TYPE (HCC): Primary | ICD-10-CM

## 2022-05-10 DIAGNOSIS — I50.9 CHRONIC HEART FAILURE, UNSPECIFIED HEART FAILURE TYPE (HCC): ICD-10-CM

## 2022-05-10 DIAGNOSIS — E16.2 LOW BLOOD SUGAR: ICD-10-CM

## 2022-05-10 LAB — GLUCOSE POC: 166 MG/DL

## 2022-05-10 PROCEDURE — 1101F PT FALLS ASSESS-DOCD LE1/YR: CPT | Performed by: NURSE PRACTITIONER

## 2022-05-10 PROCEDURE — 93000 ELECTROCARDIOGRAM COMPLETE: CPT | Performed by: NURSE PRACTITIONER

## 2022-05-10 PROCEDURE — G8510 SCR DEP NEG, NO PLAN REQD: HCPCS | Performed by: NURSE PRACTITIONER

## 2022-05-10 PROCEDURE — 71046 X-RAY EXAM CHEST 2 VIEWS: CPT

## 2022-05-10 PROCEDURE — 99215 OFFICE O/P EST HI 40 MIN: CPT | Performed by: NURSE PRACTITIONER

## 2022-05-10 PROCEDURE — 82962 GLUCOSE BLOOD TEST: CPT | Performed by: NURSE PRACTITIONER

## 2022-05-10 PROCEDURE — 3044F HG A1C LEVEL LT 7.0%: CPT | Performed by: NURSE PRACTITIONER

## 2022-05-10 PROCEDURE — G8536 NO DOC ELDER MAL SCRN: HCPCS | Performed by: NURSE PRACTITIONER

## 2022-05-10 PROCEDURE — 1111F DSCHRG MED/CURRENT MED MERGE: CPT | Performed by: NURSE PRACTITIONER

## 2022-05-10 PROCEDURE — G8427 DOCREV CUR MEDS BY ELIG CLIN: HCPCS | Performed by: NURSE PRACTITIONER

## 2022-05-10 PROCEDURE — G8420 CALC BMI NORM PARAMETERS: HCPCS | Performed by: NURSE PRACTITIONER

## 2022-05-10 RX ORDER — BUMETANIDE 2 MG/1
1 TABLET ORAL DAILY
Qty: 90 TABLET | Refills: 0 | Status: SHIPPED | OUTPATIENT
Start: 2022-05-10 | End: 2022-06-16 | Stop reason: SDUPTHER

## 2022-05-10 RX ORDER — TAMSULOSIN HYDROCHLORIDE 0.4 MG/1
0.4 CAPSULE ORAL DAILY
Qty: 30 CAPSULE | Refills: 2 | Status: SHIPPED | OUTPATIENT
Start: 2022-05-10

## 2022-05-10 NOTE — TELEPHONE ENCOUNTER
Patient's wife called and stated patient is having vision problems since this morning when he woke up. Spoke with patient who stated he is having spots in front of his eyes and he thinks it is his blood sugar. Advised patient to check his blood sugar. Patient states there is something wrong with my machine. Patient's wife states she will get patient something to eat. Reminded patient of appointment this afternoon with ALICIA Wong NP. Patient's wife stated she received a call from [facility] EP stating patient had an increase in fluid. Will notify ALICIA Wong NP.

## 2022-05-10 NOTE — PATIENT INSTRUCTIONS
Medication changes:    RESTART tamsulosin (Flomax) 0.4mg- Take one capsule by mouth once daily    STOP Farxiga     DECREASE bumex to 1mg daily starting tomorrow    DECREASE Entresto to half a tablet twice daily HOLD ENTRESTO TONIGHT    HOLD hydralazine tonight      Testing Ordered: An order for a chest Xray has been placed as part of routine LVAD follow up. This is a walk in test. Please present to the outpatient registration at [facility] to register. Our office will notify you of any abnormal results. Other Recommendations:     Please discuss your blood sugars and glucometer (home blood sugar monitor) with Dr. 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 Your urine should be clear and light yellow straw colored. Follow up TOMORROW at 1:00pm for Blood Pressure Check and Follow up in 1 week for LVAD Follow up with NP        For further patient and caregiver resources as well as access to online LVAD support groups visit [email or web address]       Please bring your daily sheets and medications to your next appointment. Please monitor your weights daily upon waking and after using the bathroom. Keep a written records of your weights and bring to your next appointment. If you have a weight gain of 3 or more pounds overnight OR 5 or more pounds in one week please contact our office. Thank you for allowing us the privilege of being a part of your healthcare team! Please do not hesitate to contact our office at [phone] with any questions or concerns.        Virtual Heart Failure [address] invites you to learn more about heart failure and to share your questions, ideas, and experiences with others. Each month, the Heart Failure Support Group features a new educational topic and a guest speaker, followed by an interactive discussion. Our Heart Failure Nurse Navigator will moderate each session. You will be able to participate by phone, tablet or computer through [address] [place] [address]. This support group takes place on the 3rd Thursday of each month from 6:00-7:30PM. All individuals living with heart failure and their caregivers are welcome to join. If you are interested in participating, please contact us at [email or web address] and you will be sent the link to join the [employer]. Heart-Healthy Diet: Care Instructions  Your Care Instructions     A heart-healthy diet has lots of vegetables, fruits, nuts, beans, and whole grains, and is low in salt. It limits foods that are high in saturated fat, such as meats, cheeses, and fried foods. It may be hard to change your diet, but even small changes can lower your risk of heart attack and heart disease. Follow-up care is a key part of your treatment and safety. Be sure to make and go to all appointments, and call your doctor if you are having problems. It's also a good idea to know your test results and keep a list of the medicines you take. How can you care for yourself at home? Watch your portions  · Use food labels to learn what the recommended servings are for the foods you eat. · Eat only the number of calories you need to stay at a healthy weight. If you need to lose weight, eat fewer calories than your body burns (through exercise and other physical activity). Eat more fruits and vegetables  · Eat a variety of fruit and vegetables every day. Dark green, deep orange, red, or yellow fruits and vegetables are especially good for you. Examples include spinach, carrots, peaches, and berries. · Keep carrots, celery, and other veggies handy for snacks.  Buy fruit that is in season and store it where you can see it so that you will be tempted to eat it. · Cook dishes that have a lot of veggies in them, such as stir-fries and soups. Limit saturated fat  · Read food labels, and try to avoid saturated fats. They increase your risk of heart disease. · Use olive or canola oil when you cook. · Bake, broil, grill, or steam foods instead of frying them. · Choose lean meats instead of high-fat meats such as hot dogs and sausages. Cut off all visible fat when you prepare meat. · Eat fish, skinless poultry, and meat alternatives such as soy products instead of high-fat meats. Soy products, such as tofu, may be especially good for your heart. · Choose low-fat or fat-free milk and dairy products. Eat foods high in fiber  · Eat a variety of grain products every day. Include whole-grain foods that have lots of fiber and nutrients. Examples of whole-grain foods include oats, whole wheat bread, and brown rice. · Buy whole-grain breads and cereals, instead of white bread or pastries. Limit salt and sodium  · Limit how much salt and sodium you eat to help lower your blood pressure. · Taste food before you salt it. Add only a little salt when you think you need it. With time, your taste buds will adjust to less salt. · Eat fewer snack items, fast foods, and other high-salt, processed foods. Check food labels for the amount of sodium in packaged foods. · Choose low-sodium versions of canned goods (such as soups, vegetables, and beans). Limit sugar  · Limit drinks and foods with added sugar. These include candy, desserts, and soda pop. Limit alcohol  · Limit alcohol to no more than 2 drinks a day for men and 1 drink a day for women. Too much alcohol can cause health problems. When should you call for help? Watch closely for changes in your health, and be sure to contact your doctor if:    · You would like help planning heart-healthy meals. Where can you learn more?   Go to http://www.gray.com/  Enter V137 in the search box to learn more about \"Heart-Healthy Diet: Care Instructions. \"  Current as of: September 8, 2021               Content Version: 13.2  © 2925-1924 Healthwise, Inc[employer]. Care instructions adapted under license by [employer] (which disclaims liability or warranty for this information). If you have questions about a medical condition or this instruction, always ask your healthcare professional. [address] any warranty or liability for your use of this information.

## 2022-05-11 ENCOUNTER — CLINICAL SUPPORT (OUTPATIENT)
Dept: CARDIOLOGY CLINIC | Age: 77
End: 2022-05-11
Payer: MEDICARE

## 2022-05-11 VITALS
SYSTOLIC BLOOD PRESSURE: 76 MMHG | RESPIRATION RATE: 14 BRPM | OXYGEN SATURATION: 99 % | BODY MASS INDEX: 21.29 KG/M2 | TEMPERATURE: 97.7 F | WEIGHT: 163.6 LBS

## 2022-05-11 DIAGNOSIS — I50.9 CHF, STAGE C (HCC): Primary | ICD-10-CM

## 2022-05-11 PROCEDURE — 99212 OFFICE O/P EST SF 10 MIN: CPT | Performed by: NURSE PRACTITIONER

## 2022-05-11 NOTE — PATIENT INSTRUCTIONS
Medication changes:    None today      Testing Ordered:    None today      Other Recommendations:     Shyla has been attempting to reach you to set up your home INR meter. Please contact them at [phone] Option 1 to set up as soon as possible. 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 Your urine should be clear and light yellow straw colored. You will be contacted for follow up scheduling. For further patient and caregiver resources as well as access to online LVAD support groups visit [email or web address]       Please bring your daily sheets and medications to your next appointment. Please monitor your weights daily upon waking and after using the bathroom. Keep a written records of your weights and bring to your next appointment. If you have a weight gain of 3 or more pounds overnight OR 5 or more pounds in one week please contact our office. Thank you for allowing us the privilege of being a part of your healthcare team! Please do not hesitate to contact our office at [phone] with any questions or concerns. Virtual Heart Failure [address] invites you to learn more about heart failure and to share your questions, ideas, and experiences with others. Each month, the Heart Failure Support Group features a new educational topic and a guest speaker, followed by an interactive discussion. Our Heart Failure Nurse Navigator will moderate each session. You will be able to participate by phone, tablet or computer through [address] [place] [address]. This support group takes place on the 3rd Thursday of each month from 6:00-7:30PM. All individuals living with heart failure and their caregivers are welcome to join.      If you are interested in participating, please contact us at [email or web address] and you will be sent the link to join the [employer].

## 2022-05-11 NOTE — PROGRESS NOTES
S: [address] was seen yesterday for full office visit at which time he was noted to be markedly IVVD with associated hypotension and fatigue. His medications were adjusted (please see yesterday's note) and he presents today for follow up MAP check. He reports symptomatic improvement with more energy, less fatigue, and increased activity tolerance today. His family notes the same.      O:   Visit Vitals  BP (!) 76/0 (BP 1 Location: Right arm, BP Patient Position: Sitting)   Temp 97.7 °F (36.5 °C) (Oral)   Resp 14   Wt 163 lb 9.6 oz (74.2 kg)   SpO2 99%   BMI 21.29 kg/m²        LVAD   Pump Speed (RPM): 4800  Pump Flow (LPM): 4.1  MAP: 76  PI (Pulsitility Index): 4.3  Power: 3.2  Back Up  at Bedside & Labeled: Yes  Outpatient: Yes  MAP in Therapeutic Range (Outpatient): Yes  Testing  Alarms Reviewed: Yes  Back up SC speed: 4800  Back up Low Speed Limit: 4400    A/P:   Dehydration  Improved following med adjustments  Continue current therapy  HH labs tomorrow; will adjust if needed  Return in 1 week for full visit as previously discussed     Signed By: Madelin Mccormack NP     May 11, 2022

## 2022-05-12 ENCOUNTER — HOME CARE VISIT (OUTPATIENT)
Dept: SCHEDULING | Facility: HOME HEALTH | Age: 77
End: 2022-05-12
Payer: MEDICARE

## 2022-05-12 ENCOUNTER — OFFICE VISIT (OUTPATIENT)
Dept: INTERNAL MEDICINE CLINIC | Age: 77
End: 2022-05-12
Payer: MEDICARE

## 2022-05-12 ENCOUNTER — TELEPHONE ANTICOAG (OUTPATIENT)
Dept: CARDIOLOGY CLINIC | Age: 77
End: 2022-05-12

## 2022-05-12 VITALS — OXYGEN SATURATION: 99 % | RESPIRATION RATE: 18 BRPM | TEMPERATURE: 98.9 F | HEART RATE: 74 BPM

## 2022-05-12 VITALS — WEIGHT: 176.2 LBS | BODY MASS INDEX: 23.35 KG/M2 | HEIGHT: 73 IN | TEMPERATURE: 97.4 F | RESPIRATION RATE: 20 BRPM

## 2022-05-12 DIAGNOSIS — I25.10 ATHEROSCLEROSIS OF NATIVE CORONARY ARTERY OF NATIVE HEART WITHOUT ANGINA PECTORIS: ICD-10-CM

## 2022-05-12 DIAGNOSIS — I50.22 CHRONIC SYSTOLIC HEART FAILURE (HCC): Primary | ICD-10-CM

## 2022-05-12 DIAGNOSIS — E11.51 TYPE 2 DIABETES, CONTROLLED, WITH PERIPHERAL CIRCULATORY DISORDER (HCC): ICD-10-CM

## 2022-05-12 DIAGNOSIS — E78.2 MIXED HYPERLIPIDEMIA: ICD-10-CM

## 2022-05-12 DIAGNOSIS — R91.8 LUNG MASS: ICD-10-CM

## 2022-05-12 LAB — INR, EXTERNAL: 3.6

## 2022-05-12 PROCEDURE — G8427 DOCREV CUR MEDS BY ELIG CLIN: HCPCS | Performed by: INTERNAL MEDICINE

## 2022-05-12 PROCEDURE — 99496 TRANSJ CARE MGMT HIGH F2F 7D: CPT | Performed by: INTERNAL MEDICINE

## 2022-05-12 PROCEDURE — G0151 HHCP-SERV OF PT,EA 15 MIN: HCPCS

## 2022-05-12 PROCEDURE — G0299 HHS/HOSPICE OF RN EA 15 MIN: HCPCS

## 2022-05-12 NOTE — Clinical Note
HISTORY OF PRESENT ILLNESS  Rafaela Gamble is a 69 y.o. male. HPI  Assessment:  Kristin Wiggins is seen today for hospital follow up for recent admission for LVAD implantation. He is accompanied by his wife and daughter. He was admitted to [facility] on 04/03/22 and discharged on 05/06. Nurse navigator contact, hospital follow up was made by phone on 05/07. His visit is today, the 12th. This represents a transitional care visit. Electronic records reviewed, including the discharge summary. 1. S/P LVAD placement. He is doing okay. He still feels weak, but is getting home health and feels like he is making some progress. He looks brighter. Denies any leg swelling. He was in the cardiology office yesterday. They are managing his medications, as well as INR. 2. Diabetes. Sugars have been stable. He is not a fan of checking his fingerstick sugar. I offered the CGM. They are very interested in this. I put in a referral for our diabetes educator, Jessica Said. We will likely check an A1c in six weeks. 3. CAD. See above, clinically stable, no active chest pains. 4. Hyperlipidemia. We will hold off on assessment until further follow up. This is an extended visit of high complexity. Social history notable for this representing a face to face visit for home health. He has an excellent support network. Medications fully reviewed and reconciled. Current Outpatient Medications   Medication Sig    sacubitril-valsartan (ENTRESTO) 49 mg/51 mg tablet Take 0.5 Tablets by mouth every twelve (12) hours. (Patient taking differently: Take 1 Tablet by mouth daily.)    bumetanide (BUMEX) 2 mg tablet Take 0.5 Tablets by mouth daily. (Patient taking differently: Take 1 Tablet by mouth daily. Take 1 tablet by mouth daily)    tamsulosin (FLOMAX) 0.4 mg capsule Take 1 Capsule by mouth daily.  eplerenone (INSPRA) 50 mg tab tablet Take 0.5 Tablets by mouth daily.  (Patient taking differently: Take 25 mg by mouth daily. take 1/2  tab po daily)    hydrALAZINE (APRESOLINE) 50 mg tablet Take 1 Tablet by mouth three (3) times daily. (Patient taking differently: Take 50 mg by mouth two (2) times a day.)    docusate sodium (COLACE) 100 mg capsule Take 1 Capsule by mouth daily for 90 days.  famotidine (PEPCID) 20 mg tablet Take 1 Tablet by mouth every twelve (12) hours.  magnesium oxide (MAG-OX) 400 mg tablet Take 1 Tablet by mouth daily.  mirtazapine (REMERON) 7.5 mg tablet Take 1 Tablet by mouth nightly.  sucralfate (CARAFATE) 1 gram tablet Take 1 Tablet by mouth Before breakfast, lunch, dinner and at bedtime.  warfarin (COUMADIN) 2.5 mg tablet Take 2 Tablets by mouth daily.  potassium chloride SR (K-TAB) 20 mEq tablet Take 2 Tablets by mouth two (2) times a day.  metFORMIN (GLUCOPHAGE) 500 mg tablet Take 1 Tablet by mouth two (2) times daily (with meals).  acetaminophen (TYLENOL EXTRA STRENGTH) 500 mg tablet Take  by mouth every six (6) hours as needed.  aspirin delayed-release 81 mg tablet Take 81 mg by mouth daily. No current facility-administered medications for this visit. Review of Systems   Constitutional: Negative for chills, fever and weight loss. Respiratory: Positive for shortness of breath. Cardiovascular: Negative for chest pain, palpitations, leg swelling and PND. Genitourinary: Positive for frequency. Musculoskeletal: Negative for myalgias. Neurological: Positive for weakness. Negative for focal weakness. Physical Exam  Vitals and nursing note reviewed. Constitutional:       Comments: Looks tired, but brighter than pre op   Cardiovascular:      Comments: Continuous hum of LVAD  Pulmonary:      Breath sounds: Examination of the left-lower field reveals decreased breath sounds. Decreased breath sounds present. No wheezing or rales. Musculoskeletal:      Right lower leg: No edema. Left lower leg: No edema. Skin:     General: Skin is warm and dry. Neurological:      Mental Status: He is alert. Psychiatric:         Behavior: Behavior normal.         ASSESSMENT and PLAN  Diagnoses and all orders for this visit:    1. Chronic systolic heart failure ([address]    2. Atherosclerosis of native coronary artery of native heart without angina pectoris    3. Mixed hyperlipidemia    4. Type 2 diabetes, controlled, with peripheral circulatory disorder (HCC)  -     REFERRAL TO PHARMACIST    5.  Lung mass

## 2022-05-12 NOTE — PROGRESS NOTES
HISTORY OF PRESENT ILLNESS  Morgan Davey is a 69 y.o. male. HPI   Dictation on: 05/12/2022  4:59 PM by: Adeel JONES [[id number]]     Current Outpatient Medications   Medication Sig    sacubitril-valsartan (ENTRESTO) 49 mg/51 mg tablet Take 0.5 Tablets by mouth every twelve (12) hours. (Patient taking differently: Take 1 Tablet by mouth daily.)    bumetanide (BUMEX) 2 mg tablet Take 0.5 Tablets by mouth daily. (Patient taking differently: Take 1 Tablet by mouth daily. Take 1 tablet by mouth daily)    tamsulosin (FLOMAX) 0.4 mg capsule Take 1 Capsule by mouth daily.  eplerenone (INSPRA) 50 mg tab tablet Take 0.5 Tablets by mouth daily. (Patient taking differently: Take 25 mg by mouth daily. take 1/2  tab po daily)    hydrALAZINE (APRESOLINE) 50 mg tablet Take 1 Tablet by mouth three (3) times daily. (Patient taking differently: Take 50 mg by mouth two (2) times a day.)    docusate sodium (COLACE) 100 mg capsule Take 1 Capsule by mouth daily for 90 days.  famotidine (PEPCID) 20 mg tablet Take 1 Tablet by mouth every twelve (12) hours.  magnesium oxide (MAG-OX) 400 mg tablet Take 1 Tablet by mouth daily.  mirtazapine (REMERON) 7.5 mg tablet Take 1 Tablet by mouth nightly.  sucralfate (CARAFATE) 1 gram tablet Take 1 Tablet by mouth Before breakfast, lunch, dinner and at bedtime.  warfarin (COUMADIN) 2.5 mg tablet Take 2 Tablets by mouth daily.  potassium chloride SR (K-TAB) 20 mEq tablet Take 2 Tablets by mouth two (2) times a day.  metFORMIN (GLUCOPHAGE) 500 mg tablet Take 1 Tablet by mouth two (2) times daily (with meals).  acetaminophen (TYLENOL EXTRA STRENGTH) 500 mg tablet Take  by mouth every six (6) hours as needed.  aspirin delayed-release 81 mg tablet Take 81 mg by mouth daily. No current facility-administered medications for this visit. Review of Systems   Constitutional: Negative for chills, fever and weight loss.    Respiratory: Positive for shortness of breath. Cardiovascular: Negative for chest pain, palpitations, leg swelling and PND. Genitourinary: Positive for frequency. Musculoskeletal: Negative for myalgias. Neurological: Positive for weakness. Negative for focal weakness. Physical Exam  Vitals and nursing note reviewed. Constitutional:       Comments: Looks tired, but brighter than pre op   Cardiovascular:      Comments: Continuous hum of LVAD  Pulmonary:      Breath sounds: Examination of the left-lower field reveals decreased breath sounds. Decreased breath sounds present. No wheezing or rales. Musculoskeletal:      Right lower leg: No edema. Left lower leg: No edema. Skin:     General: Skin is warm and dry. Neurological:      Mental Status: He is alert. Psychiatric:         Behavior: Behavior normal.         ASSESSMENT and PLAN  Diagnoses and all orders for this visit:    1. Chronic systolic heart failure ([address]    2. Atherosclerosis of native coronary artery of native heart without angina pectoris    3. Mixed hyperlipidemia    4. Type 2 diabetes, controlled, with peripheral circulatory disorder (HCC)  -     REFERRAL TO PHARMACIST    5.  Lung mass

## 2022-05-13 VITALS
RESPIRATION RATE: 20 BRPM | BODY MASS INDEX: 21.73 KG/M2 | OXYGEN SATURATION: 99 % | WEIGHT: 167 LBS | TEMPERATURE: 98.2 F

## 2022-05-13 NOTE — PROGRESS NOTES
Assessment:  Chanel Leroy is seen today for hospital follow up for recent admission for LVAD implantation. He is accompanied by his wife and daughter. He was admitted to [facility] on 04/03/22 and discharged on 05/06. Nurse navigator contact, hospital follow up was made by phone on 05/07. His visit is today, the 12th. This represents a transitional care visit. Electronic records reviewed, including the discharge summary. 1. S/P LVAD placement. He is doing okay. He still feels weak, but is getting home health and feels like he is making some progress. He looks brighter. Denies any leg swelling. He was in the cardiology office yesterday. They are managing his medications, as well as INR. 2. Diabetes. Sugars have been stable. He is not a fan of checking his fingerstick sugar. I offered the CGM. They are very interested in this. I put in a referral for our diabetes educator, Lauri Summers. We will likely check an A1c in six weeks. 3. CAD. See above, clinically stable, no active chest pains. 4. Hyperlipidemia. We will hold off on assessment until further follow up. This is an extended visit of high complexity. Social history notable for this representing a face to face visit for home health. He has an excellent support network. Medications fully reviewed and reconciled.

## 2022-05-16 ENCOUNTER — HOME CARE VISIT (OUTPATIENT)
Dept: SCHEDULING | Facility: HOME HEALTH | Age: 77
End: 2022-05-16
Payer: MEDICARE

## 2022-05-16 ENCOUNTER — TELEPHONE ANTICOAG (OUTPATIENT)
Dept: CARDIOLOGY CLINIC | Age: 77
End: 2022-05-16

## 2022-05-16 VITALS — RESPIRATION RATE: 18 BRPM | OXYGEN SATURATION: 98 % | TEMPERATURE: 96.8 F

## 2022-05-16 LAB
INR, EXTERNAL: 2.7
INR, EXTERNAL: 2.9

## 2022-05-16 PROCEDURE — G0299 HHS/HOSPICE OF RN EA 15 MIN: HCPCS

## 2022-05-16 PROCEDURE — G0152 HHCP-SERV OF OT,EA 15 MIN: HCPCS

## 2022-05-16 PROCEDURE — G0151 HHCP-SERV OF PT,EA 15 MIN: HCPCS

## 2022-05-16 NOTE — PROGRESS NOTES
Called and spoke with patient's wife with patient's coumadin dosing and INR repeat (see anticoag tracker). Reminded Brianne Vázquez of patient's appointment on 5/18/22 at 1000. Brianne Vázquez states \"I have a denist appointment and he will have to miss his appointment\". Reminded Brianne Vázquez of the importance of patient being seen this close to patient discharge and that there are no additional appointments this week. Brianne Vázquez stated \"I guess we will see\".

## 2022-05-17 ENCOUNTER — TELEPHONE (OUTPATIENT)
Dept: CARDIOLOGY CLINIC | Age: 77
End: 2022-05-17

## 2022-05-17 VITALS
BODY MASS INDEX: 21.51 KG/M2 | OXYGEN SATURATION: 98 % | RESPIRATION RATE: 20 BRPM | TEMPERATURE: 97.2 F | WEIGHT: 163 LBS

## 2022-05-17 NOTE — PROGRESS NOTES
[facility] in [facility],  [address] Visit      Patient name: Cholo Rivera  Patient : 1945  Patient MRN: [id number]  Consulting MD: Edyta att. providers found  Date of service: 22      REASON FOR REFERRAL:  Management of chronic systolic heart failure, LVAD     PLAN OF CARE:  · 69 y.o. male with severe ischemic cardiomyopathy, LVEF 15-20% s/p HM3 LVAD implant as DT on 22 by Dr. Anastasiia Jacome c/b intraoperative bleeding requiring multiple blood products and subsequent RV failure with severe TR requiring prolonged dobutamine wean   · Pt was not interested in [address] catheter or IV dobutamine at home; subsequently weaned   · Will need surveillance monthly echos to monitor TR, chest xrays to monitor pleural effusion, and aggressive PT/OT and nutritional support with weekly prealbumins and iron profiles  · Markedly volume depleted today upon assessment; GDMT adjusted     RECOMMENDATIONS:  TTE  shows improvement in TR, TAPSE 1.2 cm; next echo early    Continue speed 4800rpms, low speed 4400  No BB due to RV failure   Decrease Entresto to 1/2 tab 49/51 mg PO BID; hold tonight's dose   Cont Eplerenone 25mg daily  Reduce Bumex to 1 mg PO daily     Hold Farxiga   Continue hydralazine 50 mg PO q8h; hold tonight's dose   Last dose of amiodarone tonight   Continue warfarin, goal INR 2-3  ASA 81mg daily  Continue bowel regimen   Resume Flomax   Weekly prealbumin and iron profile with  labs   Driveline dressing changes daily until discharge; suture to be removed at first follow up appointment    PA/Lat today   CPAP qHS as OP  Advanced care plan forms on file   Strict I/O, daily weights, Na+ restricted diet   Continue VAD education   Will ask Sleep medicine to help troubleshoot mask fitting as OP  Continue HH SN/PT/OT   Return tomorrow for reassessment of volume status, and full visit in 1 week     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  · Persistent large volume chest tube output  Chronic systolic heart failure  · Stage D, NYHA class IV symptoms  · Non-ischemic cardiomyopathy, LVEF 20% with LVEDD 6.2  · RV dysfunction, TAPSE 1.22 (prelimnary read)  · TBili 1.5 likely from cardiac congestion  At risk of sudden cardiac death  · Recent cardiac arrest   · S/p ICD (1/2013, [employer] followed by [facility]); New implant on 10/21/2021 [employer]  Coronary artery disease  · S/p multiple interventions  · S/p 4V CABG (8/2012)  · LHC (7/2019) severe stenosis of LIMA to LAD anastomosis site, SVG graft to OM is occluded, SVG graft to RCA 40-50%, LAD occluded proximally, severe proximal LCx, RCA is the long . · PET (6/2019) EF 24% with anterior lateral and inferior reversible defect  Cardiac risk factors  · HTN  · HL  · DM2  · SRUTHI on CPAP  · MIld carotid stenosis  Anemia, microcytic  · Iron deficiency  DVT with filter  Pulmonary nodules   Dysphagia       LIFE GOALS:  Patient's personal goals include: getting stronger and going home soon  Important upcoming milestones or family events: The patient identifies the following as important for living well: being independent, being at home, enjoying family time      [facility]:  Echo 5/4/22 LVEF 15-20%, mod TR, LVIDd 5.3 cm, TAPSE 0.9 cm   Echo 4/26/22 - LVIDd 4.9 cm, TAPSE 0.5 cm,   Echo 4/18/22- LVEF 15-20%, severe TR, TAPSE 0.5cm, RVIDd 6cm   Echo (4/8/22)    Left Ventricle: Left ventricle size is normal. Moderately increased wall thickness. Findings consistent with concentric remodeling. Severe global hypokinesis present. Severely reduced left ventricular systolic function with a visually estimated EF of 10 -15%. 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   Aortic Valve: Moderate sclerosis of the aortic valve cusp.   Mitral Valve: Moderately thickened leaflet.  Mild transvalvular regurgitation.   Tricuspid Valve: Severe transvalvular regurgitation.   Right Atrium: Right atrium is severely dilated. Echo (3/2/22)   · LV 10-15%  · Mod-severe MR      Echo (11/23/21):  · LV 15-20%. · Mod-severe, MR, mod-TR     Echo (5/20/21)  · LV: Estimated LVEF is 20 - 25%. Normal wall thickness. Mildly dilated left ventricle. Severely and globally reduced systolic function. Severe (grade 4) left ventricular diastolic dysfunction. · LA: Severely dilated left atrium. · RA: Severely dilated right atrium. · MV: Moderate mitral valve regurgitation is present. · TV: Moderate tricuspid valve regurgitation is present. · AO: Mild aortic root dilatation. · RV: Pacer/ICD present. · LVED 6.2cm     EKG (5/20/21) SB with 1degree AV block, QRS 116ms     LHC (5/24/21) Native Coronaries: LM - moderate to severe distal disease 50%. % prox occluded (), heavily calcified, LCx: 80% proximal stenosis. OM1 is  (seen filling faintly via collaterals). OM2 99% stenosis. RCA: 100% proximal occluded. LIMA to LAD: patent, supplies only a diagonal branch (probably D2). The LAD distal to the bifurcation is 100% occluded () and fills via right to left collaterals off the SVG to RCA. SVG to R-PDA: patent with moderate diffuse irregularities but no obstructive disease in the graft. No appealing interventional targets. [address] Report 5/4/2022 5/2/2022 11/12/2021 7/6/2021 5/20/2021   (PRE) HR 82 82 88 98 74   (PRE) O2 Sat - 98 100 - 99   (POST) HR 91 88 102 - 81   (POST) O2 Sat - 98 99 98% on Ra 99   Distance in Meters 191.41 181.36 386. 62 - 1158.24         BRIEF HISTORY OF PRESENT ILLNESS:  Selina Truong is a 69 y.o. male with h/o HTN, HL, DM2, SRUTHI on CPAP, chronic systolic heart failure, stage D, NYHA class IV symptoms, non-ischemic cardiomyopathy, LVEF 20% with LVEDD 6.2, RV dysfunciton, TAPSE 1.22, TBili 1.5 likely from cardiac congestion, recent cardiac arrest s/p ICD (1/2013, [employer] followed by [facility]), coronary artery disease s/p multiple interventions s/p 4V CABG (8/2012), LHC (7/2019) severe stenosis of LIMA to LAD anastomosis site, SVG graft to OM is occluded, SVG graft to RCA 40-50%, LAD occluded proximally, severe proximal LCx, RCA is the long . PET (6/2019) EF 24% with anterior lateral and inferior reversible defect. Anemia, microcytic with iron deficiency, DVT with filter. Patient was referred to [facility] by Dr. Lindsey Olmedo in 2021 for evaluation of his candidacy for advanced therapies. Patient agreed to inpatient initiation of palliative infusion of chronic inotropes and evaluation for LVAD-DT. Patient was admitted 5/2-5/25/21. Patient was started on milrinone infusion and had first part of LVAD evaluation completed. Right and left heart cath on 5/24/21 showed severe diffuse native vessel CAD, with patent grafts (LIMA to D2, SVG to RCA). Pt was found to have pulmonary nodule during workup. Antiplatelet therapy was held during hospitalization and after discharge due to anticipated pulmonary nodule biopsy, bone marrow biopsy and EGD/C-Scope as part of LVAD workup. Pt found to have adenocarcinoma of the lung, and so LVAD was deferred pending treatment. Patient completed radiation treatment for adenocarcinoma of the lung. Hem-onc cleared patient for VAD implant with 90% 2 year prognosis. He presented to [facility] on 4/3 for admission for planned LVAD implant which he underwent on 4/5/22. Was re-do sternotomy, bleeding intra-op, required cryo, k-centra, FFP, Plt, and cellsaver in OR. Had RV dysfunction noted intra-op; requiring lower VAD speed and dual inotrope support. 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 He also had issues with significant CT drainage requiring prolonged placement. He was eventually discharged home on POD 29 with GDMT (no BB due to RV failure) with [facility] SN/PT and OT.      INTERVAL HISTORY:   Melissa Colby presents today for hospital discharge follow up. He is doing poorly. He is dizzy, fatigued, and feels dehydrated. His family notes that he has trouble staying awake. He has been compliant with his medications. He is brought in today in a wheelchair, although he was able to transfer to the exam table without issue. He denies CP, palpitations, nausea, vomiting LE edema. His weight is down 2 lbs from hospital discharge and his MAP is only 66 mmHg. REVIEW OF SYSTEMS:  Review of Systems   Constitutional: Positive for malaise/fatigue. Negative for chills, fever and weight loss. Respiratory: Negative for cough and shortness of breath. Cardiovascular: Negative for chest pain, palpitations, orthopnea and leg swelling. Gastrointestinal: Negative for abdominal pain, constipation, diarrhea, heartburn, nausea and vomiting. Neurological: Positive for weakness. Negative for dizziness. Psychiatric/Behavioral: Negative for depression. PHYSICAL EXAM:  Visit Vitals  BP (!) 66/0   Temp 98.3 °F (36.8 °C) (Oral)   Resp 18   Wt 166 lb 6.4 oz (75.5 kg)   BMI 21.66 kg/m²       LVAD INTERROGATION:  Device interrogated in person  Device function normal, normal flow, no events  LVAD   Pump Speed (RPM): 4800  Pump Flow (LPM): 4.5  PI (Pulsitility Index): 2.9  Power: 3.2  Back Up  at Bedside & Labeled: Yes  Testing  Alarms Reviewed: Yes  Back up SC speed: 4800  Back up Low Speed Limit: 4400  Emergency Equipment with Patient?: Yes  Emergency procedures reviewed?: Yes      Physical Exam  Vitals and nursing note reviewed. Constitutional:       General: He is not in acute distress. Appearance: Normal appearance. Cardiovascular:      Rate and Rhythm: Normal rate and regular rhythm. Comments: LVAD Hum noted on auscultation  Pulmonary:      Effort: Pulmonary effort is normal. No respiratory distress. Abdominal:      General: Bowel sounds are normal. There is no distension. Palpations: Abdomen is soft. Tenderness: There is no abdominal tenderness. Musculoskeletal:      Right lower leg: No edema. Left lower leg: No edema. Skin:     General: Skin is warm and dry. Capillary Refill: Capillary refill takes less than 2 seconds. Neurological:      General: No focal deficit present. Mental Status: He is oriented to person, place, and time. He is lethargic. Psychiatric:         Mood and Affect: Mood normal.         Behavior: Behavior normal.         Thought Content: Thought content normal.         Judgment: Judgment normal.              PAST MEDICAL HISTORY:  Past Medical History:   Diagnosis Date    CAD (coronary artery disease) '90 '97, '13 x 2    MI, code ice in 2013 at [facility]    Calculus of kidney     Colonic polyps     Congestive heart failure, unspecified     Diabetes ([facility] [address]     Gastritis     Hypercholesterolemia     Sleep apnea        PAST SURGICAL HISTORY:  Past Surgical History:   Procedure Laterality Date    COLONOSCOPY  04/06/2011    16, due 21    COLONOSCOPY N/A 3/2/2022    COLONOSCOPY    :- performed by Keiry [facility] MD at [facility] ENDOSCOPY    ENDOSCOPY, COLON, DIAGNOSTIC      11, due 16    HX CORONARY ARTERY BYPASS GRAFT  8/22/12    x 4 vessel by MISSY LION    HX PACEMAKER PLACEMENT  1/30/13    AR CARDIAC SURG PROCEDURE UNLIST  2012    x 4 vessels       FAMILY HISTORY:  Family History   Problem Relation Age of Onset    Heart Disease Mother         MI    Heart Disease Father         CAD & PVD    Lung Disease Father     Cancer Father         lung    Diabetes Maternal Grandmother     Heart Disease Other         CAD    Stroke Sister        SOCIAL HISTORY:  Social History     Socioeconomic History    Marital status:    Tobacco Use    Smoking status: Never Smoker    Smokeless tobacco: Never Used   Vaping Use    Vaping Use: Never used   Substance and Sexual Activity    Alcohol use:  Yes     Alcohol/week: 0.0 standard drinks     Comment:  VERY RARE    Drug use: No       LABORATORY RESULTS:     Labs Latest Ref Rng & Units 5/6/2022 5/5/2022 5/4/2022 5/3/2022 5/2/2022 5/1/2022 4/30/2022   WBC 4.1 - 11.1 K/uL 6.0 6.0 6.5 5.3 5.7 5.5 5.6   RBC 4.10 - 5.70 M/uL 3.37(L) 3.19(L) 3.19(L) 3.19(L) 3.08(L) 2.92(L) 2.95(L)   Hemoglobin 12.1 - 17.0 g/dL 8.6(L) 8.2(L) 8.2(L) 8. 1(L) 7. 9(L) 7. 6(L) 7. 6(L)   Hematocrit 36.6 - 50.3 % 27. 6(L) 26. 0(L) 26. 4(L) 25. 7(L) 25. 8(L) 24. 7(L) 24. 6(L)   MCV 80.0 - 99.0 FL 81.9 81.5 82.8 80.6 83.8 84.6 83.4   Platelets 475 - 889 K/uL 358 326 293 315 298 274 275   Lymphocytes 12 - 49 % - - - - - - -   Monocytes 5 - 13 % - - - - - - -   Eosinophils 0 - 7 % - - - - - - -   Basophils 0 - 1 % - - - - - - -   Bands 0 - 6 % - - - - - - -   Albumin 3.5 - 5.0 g/dL 2. 9(L) 2. 9(L) 3.0(L) 2. 8(L) 2. 9(L) 2. 6(L) 2. 8(L)   Calcium 8.5 - 10.1 MG/DL 8.9 8.6 8.4(L) 8.6 8.2(L) 8.0(L) 7. 7(L)   Glucose 65 - 100 mg/dL 111(H) 100 166(H) 102(H) 95 199(H) 125(H)   BUN 6 - 20 MG/DL 25(H) 24(H) 20 21(H) 18 17 24(H)   Creatinine 0.70 - 1.30 MG/DL 0.93 0.97 0.92 0.86 0.86 0.86 1.01   Sodium 136 - 145 mmol/L 136 137 134(L) 136 138 133(L) 137   Potassium 3.5 - 5.1 mmol/L 3.7 3.5 3.8 3.6 3.6 3.7 4.0   TSH 0.36 - 3.74 uIU/mL - - - - - - -   LDH 85 - 241 U/L 172 172 170 175 163 177 158   Some recent data might be hidden     Lab Results   Component Value Date/Time    TSH 2.26 04/10/2022 03:00 AM    TSH 1.68 03/01/2022 11:50 AM    TSH 1.47 05/26/2021 10:44 PM    TSH 1.97 05/20/2021 04:28 PM    TSH 1.41 02/09/2021 03:05 PM    TSH 1.740 08/14/2018 09:58 AM    TSH 1.710 10/18/2017 12:00 AM       ALLERGY:  Allergies   Allergen Reactions    Oxycodone Anaphylaxis    Pcn [Penicillins] Hives        CURRENT MEDICATIONS:    Current Outpatient Medications:     sacubitril-valsartan (ENTRESTO) 49 mg/51 mg tablet, Take 0.5 Tablets by mouth every twelve (12) hours.  (Patient taking differently: Take 1 Tablet by mouth daily.), Disp: 60 Tablet, Rfl: 2    bumetanide (BUMEX) 2 mg tablet, Take 0.5 Tablets by mouth daily. (Patient taking differently: Take 1 Tablet by mouth daily. Take 1 tablet by mouth daily), Disp: 90 Tablet, Rfl: 0    tamsulosin (FLOMAX) 0.4 mg capsule, Take 1 Capsule by mouth daily. , Disp: 30 Capsule, Rfl: 2    eplerenone (INSPRA) 50 mg tab tablet, Take 0.5 Tablets by mouth daily. (Patient taking differently: Take 25 mg by mouth daily. take 1/2  tab po daily), Disp: 90 Tablet, Rfl: 1    hydrALAZINE (APRESOLINE) 50 mg tablet, Take 1 Tablet by mouth three (3) times daily. (Patient taking differently: Take 50 mg by mouth two (2) times a day.), Disp: 180 Tablet, Rfl: 2    docusate sodium (COLACE) 100 mg capsule, Take 1 Capsule by mouth daily for 90 days. , Disp: 30 Capsule, Rfl: 2    famotidine (PEPCID) 20 mg tablet, Take 1 Tablet by mouth every twelve (12) hours. , Disp: 60 Tablet, Rfl: 2    magnesium oxide (MAG-OX) 400 mg tablet, Take 1 Tablet by mouth daily. , Disp: 30 Tablet, Rfl: 2    mirtazapine (REMERON) 7.5 mg tablet, Take 1 Tablet by mouth nightly., Disp: 30 Tablet, Rfl: 2    sucralfate (CARAFATE) 1 gram tablet, Take 1 Tablet by mouth Before breakfast, lunch, dinner and at bedtime. , Disp: 120 Tablet, Rfl: 2    warfarin (COUMADIN) 2.5 mg tablet, Take 2 Tablets by mouth daily. , Disp: 90 Tablet, Rfl: 2    potassium chloride SR (K-TAB) 20 mEq tablet, Take 2 Tablets by mouth two (2) times a day., Disp: 360 Tablet, Rfl: 1    metFORMIN (GLUCOPHAGE) 500 mg tablet, Take 1 Tablet by mouth two (2) times daily (with meals). , Disp: 180 Tablet, Rfl: 3    acetaminophen (TYLENOL EXTRA STRENGTH) 500 mg tablet, Take 500 mg by mouth every six (6) hours as needed for Fever or Pain., Disp: , Rfl:     aspirin delayed-release 81 mg tablet, Take 81 mg by mouth daily. , Disp: , Rfl:     PATIENT CARE TEAM:  Patient Care Team:  Angelique Solano MD as PCP - General (Internal Medicine Physician)  Angelique Solano MD as PCP - [facility] Empaneled Provider  Shahid Turner MD as Physician (Cardiovascular Disease Physician)  Arty Lombard., MD as Physician (Gastroenterology)  Jared Foster MD as Physician (Orthopedic Surgery)  Krystyna Casas MD as Physician (Ophthalmology)  Domonique Odom MD (Cardiovascular Disease Physician)  Poornima Upton MD (Cardiovascular Disease Physician)     Thank you for allowing us to participate in this patient's care.     Rui Jack NP  [address] [place] [address], [address]  Phone: [phone]

## 2022-05-17 NOTE — PATIENT INSTRUCTIONS
No Medication changes. Testing Ordered:    EKG done in clinic today. We will send results to [address] [place] [address] per their request.       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 Your urine should be clear and light yellow straw colored. Follow up in  1 week (5/26/22 @ 1:00 PM) with Rudolph Mccloud      For further patient and caregiver resources as well as access to online LVAD support groups visit [email or web address]       Please bring your daily sheets and medications to your next appointment. Please monitor your weights daily upon waking and after using the bathroom. Keep a written records of your weights and bring to your next appointment. If you have a weight gain of 3 or more pounds overnight OR 5 or more pounds in one week please contact our office. Thank you for allowing us the privilege of being a part of your healthcare team! Please do not hesitate to contact our office at [phone] with any questions or concerns. Virtual Heart Failure [address] invites you to learn more about heart failure and to share your questions, ideas, and experiences with others. Each month, the Heart Failure Support Group features a new educational topic and a guest speaker, followed by an interactive discussion. Our Heart Failure Nurse Navigator will moderate each session. You will be able to participate by phone, tablet or computer through [employer]. This support group takes place on the 3rd Thursday of each month from 6:00-7:30PM. All individuals living with heart failure and their caregivers are welcome to join. If you are interested in participating, please contact us at [email or web address] and you will be sent the link to join the [employer].

## 2022-05-17 NOTE — TELEPHONE ENCOUNTER
Received voicemail message from patient's wife stating his daughter will bring patient to his clinic appointment on Wednesday.

## 2022-05-17 NOTE — TELEPHONE ENCOUNTER
Called patient's insurance [employer] and spoke with Tracy. Patient does not need a prior auth for  - [employer]ation Bag Left. Call reference number [id number].

## 2022-05-17 NOTE — TELEPHONE ENCOUNTER
Telephone Call RE:  Appointment reminder     Outcome:     [x] Patient confirmed appointment   [] Patient rescheduled appointment for    [] Unable to reach  [] Left message             [] Other:     ---------------------             [x] Screened for [facility]

## 2022-05-18 ENCOUNTER — OFFICE VISIT (OUTPATIENT)
Dept: CARDIOLOGY CLINIC | Age: 77
End: 2022-05-18
Payer: MEDICARE

## 2022-05-18 ENCOUNTER — HOME CARE VISIT (OUTPATIENT)
Dept: SCHEDULING | Facility: HOME HEALTH | Age: 77
End: 2022-05-18
Payer: MEDICARE

## 2022-05-18 VITALS — WEIGHT: 163 LBS | TEMPERATURE: 97.4 F | BODY MASS INDEX: 21.51 KG/M2

## 2022-05-18 VITALS
RESPIRATION RATE: 16 BRPM | BODY MASS INDEX: 22.26 KG/M2 | OXYGEN SATURATION: 98 % | WEIGHT: 168 LBS | TEMPERATURE: 98.2 F | SYSTOLIC BLOOD PRESSURE: 72 MMHG | HEIGHT: 73 IN | HEART RATE: 50 BPM

## 2022-05-18 VITALS — TEMPERATURE: 98.5 F | OXYGEN SATURATION: 98 % | RESPIRATION RATE: 18 BRPM

## 2022-05-18 DIAGNOSIS — I50.9 CHRONIC HEART FAILURE, UNSPECIFIED HEART FAILURE TYPE (HCC): Primary | ICD-10-CM

## 2022-05-18 DIAGNOSIS — I25.5 ISCHEMIC CARDIOMYOPATHY: ICD-10-CM

## 2022-05-18 DIAGNOSIS — Z95.811 LVAD (LEFT VENTRICULAR ASSIST DEVICE) PRESENT (HCC): ICD-10-CM

## 2022-05-18 PROCEDURE — G8427 DOCREV CUR MEDS BY ELIG CLIN: HCPCS | Performed by: NURSE PRACTITIONER

## 2022-05-18 PROCEDURE — 1101F PT FALLS ASSESS-DOCD LE1/YR: CPT | Performed by: NURSE PRACTITIONER

## 2022-05-18 PROCEDURE — 93750 INTERROGATION VAD IN PERSON: CPT | Performed by: NURSE PRACTITIONER

## 2022-05-18 PROCEDURE — 93000 ELECTROCARDIOGRAM COMPLETE: CPT | Performed by: NURSE PRACTITIONER

## 2022-05-18 PROCEDURE — G8432 DEP SCR NOT DOC, RNG: HCPCS | Performed by: NURSE PRACTITIONER

## 2022-05-18 PROCEDURE — G0151 HHCP-SERV OF PT,EA 15 MIN: HCPCS

## 2022-05-18 PROCEDURE — 99214 OFFICE O/P EST MOD 30 MIN: CPT | Performed by: NURSE PRACTITIONER

## 2022-05-18 PROCEDURE — G8420 CALC BMI NORM PARAMETERS: HCPCS | Performed by: NURSE PRACTITIONER

## 2022-05-18 PROCEDURE — 1111F DSCHRG MED/CURRENT MED MERGE: CPT | Performed by: NURSE PRACTITIONER

## 2022-05-18 PROCEDURE — G8536 NO DOC ELDER MAL SCRN: HCPCS | Performed by: NURSE PRACTITIONER

## 2022-05-18 NOTE — PROGRESS NOTES
Shola Carver is a 69 y.o. male with a history of ICM with Heartmate 3 implant date of 4/5/22. Patient was seen in clinic for routine follow up at which time patient request a consolidated bag. Consolidated bag ordered to ensure safe operation of device per  guidelines. Drive line dressing change completed per policy. No signs or symptoms of infection noted. Reviewed steps of sterile driveline dressing change with patient and patient's daughter. EKG completed and faxed to [facility] device clinic. Reviewed AVS with patient and patient's daughter. No further questions at this time.      Judit Medina RN  VAD Coordinator

## 2022-05-18 NOTE — PROGRESS NOTES
[facility] in [place],  [address] Visit      Patient name: Tate Casas  Patient : 1945  Patient MRN: [id number]  Consulting MD: No att. providers found  Date of service: 22      REASON FOR REFERRAL:  Management of chronic systolic heart failure, LVAD     PLAN OF CARE:  · 69 y.o. male with severe ischemic cardiomyopathy, LVEF 15-20% s/p HM3 LVAD implant as DT on 22 by Dr. Nayan Baez c/b intraoperative bleeding requiring multiple blood products and subsequent RV failure with severe TR requiring prolonged dobutamine wean   · Pt was not interested in [address] catheter or IV dobutamine at home; subsequently weaned   · Will need surveillance monthly echos to monitor TR, chest xrays to monitor pleural effusion, and aggressive PT/OT and nutritional support with weekly prealbumins and iron profiles  · Markedly volume depleted at last appt; GDMT adjusted; appears more euvolemic today     RECOMMENDATIONS:  TTE  shows improvement in TR, TAPSE 1.2 cm; next echo early    Continue speed 4800rpms, low speed 4400  No BB due to RV failure   Continue Entresto at reduced dose, 1/2 tab 49/51 mg PO BID, can send rx for 24/26mg strength tabs with next refill  Cont Eplerenone 25mg daily  Bumex 1 mg PO daily     Hold Farxiga 1 more week, consider restarting at next visit  Continue hydralazine 50 mg PO q8h; hold tonight's dose   Last dose of amiodarone tonight   Continue warfarin, goal INR 2-3  ASA 81mg daily  Continue bowel regimen   Continue Flomax   Weekly prealbumin and iron profile with  labs   Driveline dressing changes 3 times a week  CPAP qHS   EKG today in clinic  Advanced care plan forms on file   Strict I/O, daily weights, Na+ restricted diet   Continue VAD education   Follow up with Sleep medicine to help troubleshoot mask fitting  Continue  SN/PT/OT   Follow up in clinic in 1 week       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  · Persistent large volume chest tube output  Chronic systolic heart failure  · Stage D, NYHA class IV symptoms  · Non-ischemic cardiomyopathy, LVEF 20% with LVEDD 6.2  · RV dysfunction  At risk of sudden cardiac death  · Recent cardiac arrest   · S/p ICD (1/2013, [employer] followed by [address] [place] [address]); New implant on 10/21/2021 [employer]  Coronary artery disease  · S/p multiple interventions  · S/p 4V CABG (8/2012)  · LHC (7/2019) severe stenosis of LIMA to LAD anastomosis site, SVG graft to OM is occluded, SVG graft to RCA 40-50%, LAD occluded proximally, severe proximal LCx, RCA is the long . · PET (6/2019) EF 24% with anterior lateral and inferior reversible defect  Cardiac risk factors  · HTN  · HL  · DM2  · SRUTHI on CPAP  · MIld carotid stenosis  Anemia, microcytic  · Iron deficiency  DVT with filter  Pulmonary nodules   Dysphagia       LIFE GOALS:  Patient's personal goals include: getting stronger and going home soon  Important upcoming milestones or family events: The patient identifies the following as important for living well: being independent, being at home, enjoying family time      [facility]:  Echo 5/4/22 LVEF 15-20%, mod TR, LVIDd 5.3 cm, TAPSE 0.9 cm   Echo 4/26/22 - LVIDd 4.9 cm, TAPSE 0.5 cm,   Echo 4/18/22- LVEF 15-20%, severe TR, TAPSE 0.5cm, RVIDd 6cm   Echo (4/8/22)    Left Ventricle: Left ventricle size is normal. Moderately increased wall thickness. Findings consistent with concentric remodeling. Severe global hypokinesis present. Severely reduced left ventricular systolic function with a visually estimated EF of 10 -15%. 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   Aortic Valve: Moderate sclerosis of the aortic valve cusp.   Mitral Valve: Moderately thickened leaflet. Mild transvalvular regurgitation.   Tricuspid Valve: Severe transvalvular regurgitation.    Right Atrium: Right atrium is severely dilated. Echo (3/2/22)   · LV 10-15%  · Mod-severe MR      Echo (11/23/21):  · LV 15-20%. · Mod-severe, MR, mod-TR     Echo (5/20/21)  · LV: Estimated LVEF is 20 - 25%. Normal wall thickness. Mildly dilated left ventricle. Severely and globally reduced systolic function. Severe (grade 4) left ventricular diastolic dysfunction. · LA: Severely dilated left atrium. · RA: Severely dilated right atrium. · MV: Moderate mitral valve regurgitation is present. · TV: Moderate tricuspid valve regurgitation is present. · AO: Mild aortic root dilatation. · RV: Pacer/ICD present. · LVED 6.2cm     EKG (5/20/21) SB with 1degree AV block, QRS 116ms     LHC (5/24/21) Native Coronaries: LM - moderate to severe distal disease 50%. % prox occluded (), heavily calcified, LCx: 80% proximal stenosis. OM1 is  (seen filling faintly via collaterals). OM2 99% stenosis. RCA: 100% proximal occluded. LIMA to LAD: patent, supplies only a diagonal branch (probably D2). The LAD distal to the bifurcation is 100% occluded () and fills via right to left collaterals off the SVG to RCA. SVG to R-PDA: patent with moderate diffuse irregularities but no obstructive disease in the graft. No appealing interventional targets. [address] Report 5/4/2022 5/2/2022 11/12/2021 7/6/2021 5/20/2021   (PRE) HR 82 82 88 98 74   (PRE) O2 Sat - 98 100 - 99   (POST) HR 91 88 102 - 81   (POST) O2 Sat - 98 99 98% on Ra 99   Distance in Meters 191.41 181.36 386. 62 - 1158.24         BRIEF HISTORY OF PRESENT ILLNESS:  Jo Shah is a 69 y.o. male with h/o HTN, HL, DM2, SRUTHI on CPAP, chronic systolic heart failure, stage D, NYHA class IV symptoms, non-ischemic cardiomyopathy, LVEF 20% with LVEDD 6.2, RV dysfunciton, TAPSE 1.22, TBili 1.5 likely from cardiac congestion, recent cardiac arrest s/p ICD (1/2013, [address] followed by [address] [place] [address]), coronary artery disease s/p multiple interventions s/p 4V CABG (8/2012), LHC (7/2019) severe stenosis of LIMA to LAD anastomosis site, SVG graft to OM is occluded, SVG graft to RCA 40-50%, LAD occluded proximally, severe proximal LCx, RCA is the long . PET (6/2019) EF 24% with anterior lateral and inferior reversible defect. Anemia, microcytic with iron deficiency, DVT with filter. Patient was referred to [facility] by Dr. Arsh Bledsoe in 2021 for evaluation of his candidacy for advanced therapies. Patient agreed to inpatient initiation of palliative infusion of chronic inotropes and evaluation for LVAD-DT. Patient was admitted 5/2-5/25/21. Patient was started on milrinone infusion and had first part of LVAD evaluation completed. Right and left heart cath on 5/24/21 showed severe diffuse native vessel CAD, with patent grafts (LIMA to D2, SVG to RCA). Pt was found to have pulmonary nodule during workup. Antiplatelet therapy was held during hospitalization and after discharge due to anticipated pulmonary nodule biopsy, bone marrow biopsy and EGD/C-Scope as part of LVAD workup. Pt found to have adenocarcinoma of the lung, and so LVAD was deferred pending treatment. Patient completed radiation treatment for adenocarcinoma of the lung. Hem-onc cleared patient for VAD implant with 90% 2 year prognosis. He presented to [facility] on 4[facility]3 for admission for planned LVAD implant which he underwent on 4/5/22. Was re-do sternotomy, bleeding intra-op, required cryo, k-centra, FFP, Plt, and cellsaver in OR. Had RV dysfunction noted intra-op; requiring lower VAD speed and dual inotrope support. 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 He also had issues with significant CT drainage requiring prolonged placement. He was eventually discharged home on POD 29 with GDMT (no BB due to RV failure) with [facility] SN/PT and OT. INTERVAL HISTORY:   Pt presents for routine follow up today. At his last clinic visit, he was hypovolemic and had adjustment of his meds. Today, he reports feeling better. No postural dizziness, feeling well with activity and walking around his house. He is working with home health PT/OT and nursing, feels well with that activity, has been walking outside with PT. He has no acute concerns today. REVIEW OF SYSTEMS:  Review of Systems   Constitutional: Positive for malaise/fatigue. Negative for chills, fever and weight loss. Respiratory: Negative for cough and shortness of breath. Cardiovascular: Negative for chest pain, palpitations, orthopnea and leg swelling. Gastrointestinal: Negative for abdominal pain, constipation, diarrhea, heartburn, nausea and vomiting. Neurological: Positive for weakness. Negative for dizziness. Psychiatric/Behavioral: Negative for depression. PHYSICAL EXAM:  Visit Vitals  BP (!) 72/0 (BP 1 Location: Right arm, BP Patient Position: Sitting, BP Cuff Size: Adult)   Pulse (!) 50   Temp 98.2 °F (36.8 °C) (Oral)   Resp 16   Ht 6' 1\" (1.854 m)   Wt 168 lb (76.2 kg)   SpO2 98%   BMI 22.16 kg/m²       LVAD INTERROGATION:  Device interrogated in person  Device function normal, normal flow, no events  LVAD   Pump Speed (RPM): 4800  Pump Flow (LPM): 3.9  PI (Pulsitility Index): 5  Power: 3.1         Physical Exam  Vitals and nursing note reviewed. Constitutional:       General: He is not in acute distress. Appearance: Normal appearance. Cardiovascular:      Rate and Rhythm: Normal rate and regular rhythm. Comments: LVAD Hum noted on auscultation  Pulmonary:      Effort: Pulmonary effort is normal. No respiratory distress. Abdominal:      General: Bowel sounds are normal. There is no distension. Palpations: Abdomen is soft. Tenderness: There is no abdominal tenderness. Musculoskeletal:      Right lower leg: No edema. Left lower leg: No edema. Skin:     General: Skin is warm and dry. Capillary Refill: Capillary refill takes less than 2 seconds.    Neurological: General: No focal deficit present. Mental Status: He is oriented to person, place, and time. He is lethargic. Psychiatric:         Mood and Affect: Mood normal.         Behavior: Behavior normal.         Thought Content: Thought content normal.         Judgment: Judgment normal.              PAST MEDICAL HISTORY:  Past Medical History:   Diagnosis Date    CAD (coronary artery disease) '90 '97, '13 x 2    MI, code ice in 2013 at [facility]    Calculus of kidney     Colonic polyps     Congestive heart failure, unspecified     Diabetes ([address]     Gastritis     Hypercholesterolemia     Sleep apnea        PAST SURGICAL HISTORY:  Past Surgical History:   Procedure Laterality Date    COLONOSCOPY  04/06/2011    16, due 21    COLONOSCOPY N/A 3/2/2022    COLONOSCOPY    :- performed by Kita Larry [facility] at [facility] ENDOSCOPY    ENDOSCOPY, COLON, DIAGNOSTIC      11, due 16    HX CORONARY ARTERY BYPASS GRAFT  8/22/12    x 4 vessel by MISSY Ramirez    HX HEENT      LASIK    HX PACEMAKER PLACEMENT  1/30/13    GA CARDIAC SURG PROCEDURE UNLIST  2012    x 4 vessels       FAMILY HISTORY:  Family History   Problem Relation Age of Onset    Heart Disease Mother         MI    Heart Disease Father         CAD & PVD    Lung Disease Father     Cancer Father         lung    Diabetes Maternal Grandmother     Heart Disease Other         CAD    Stroke Sister        SOCIAL HISTORY:  Social History     Socioeconomic History    Marital status:    Tobacco Use    Smoking status: Never Smoker    Smokeless tobacco: Never Used   Vaping Use    Vaping Use: Never used   Substance and Sexual Activity    Alcohol use:  Yes     Alcohol/week: 0.0 standard drinks     Comment:  VERY RARE    Drug use: No       LABORATORY RESULTS:     Labs Latest Ref Rng & Units 5/6/2022 5/5/2022 5/4/2022 5/3/2022 5/2/2022 5/1/2022 4/30/2022   WBC 4.1 - 11.1 K/uL 6.0 6.0 6.5 5.3 5.7 5.5 5.6   RBC 4.10 - 5.70 M/uL 3.37(L) 3.19(L) 3.19(L) 3.19(L) 3.08(L) 2.92(L) 2.95(L)   Hemoglobin 12.1 - 17.0 g/dL 8.6(L) 8.2(L) 8.2(L) 8. 1(L) 7. 9(L) 7. 6(L) 7. 6(L)   Hematocrit 36.6 - 50.3 % 27. 6(L) 26. 0(L) 26. 4(L) 25. 7(L) 25. 8(L) 24. 7(L) 24. 6(L)   MCV 80.0 - 99.0 FL 81.9 81.5 82.8 80.6 83.8 84.6 83.4   Platelets 405 - 989 K/uL 358 326 293 315 298 274 275   Lymphocytes 12 - 49 % - - - - - - -   Monocytes 5 - 13 % - - - - - - -   Eosinophils 0 - 7 % - - - - - - -   Basophils 0 - 1 % - - - - - - -   Bands 0 - 6 % - - - - - - -   Albumin 3.5 - 5.0 g/dL 2. 9(L) 2. 9(L) 3.0(L) 2. 8(L) 2. 9(L) 2. 6(L) 2. 8(L)   Calcium 8.5 - 10.1 MG/DL 8.9 8.6 8.4(L) 8.6 8.2(L) 8.0(L) 7. 7(L)   Glucose 65 - 100 mg/dL 111(H) 100 166(H) 102(H) 95 199(H) 125(H)   BUN 6 - 20 MG/DL 25(H) 24(H) 20 21(H) 18 17 24(H)   Creatinine 0.70 - 1.30 MG/DL 0.93 0.97 0.92 0.86 0.86 0.86 1.01   Sodium 136 - 145 mmol/L 136 137 134(L) 136 138 133(L) 137   Potassium 3.5 - 5.1 mmol/L 3.7 3.5 3.8 3.6 3.6 3.7 4.0   TSH 0.36 - 3.74 uIU/mL - - - - - - -   LDH 85 - 241 U/L 172 172 170 175 163 177 158   Some recent data might be hidden     Lab Results   Component Value Date/Time    TSH 2.26 04/10/2022 03:00 AM    TSH 1.68 03/01/2022 11:50 AM    TSH 1.47 05/26/2021 10:44 PM    TSH 1.97 05/20/2021 04:28 PM    TSH 1.41 02/09/2021 03:05 PM    TSH 1.740 08/14/2018 09:58 AM    TSH 1.710 10/18/2017 12:00 AM       ALLERGY:  Allergies   Allergen Reactions    Oxycodone Anaphylaxis    Pcn [Penicillins] Hives        CURRENT MEDICATIONS:    Current Outpatient Medications:     sacubitril-valsartan (ENTRESTO) 49 mg/51 mg tablet, Take 0.5 Tablets by mouth every twelve (12) hours. (Patient taking differently: Take 1 Tablet by mouth daily.), Disp: 60 Tablet, Rfl: 2    bumetanide (BUMEX) 2 mg tablet, Take 0.5 Tablets by mouth daily. (Patient taking differently: Take 1 Tablet by mouth daily. Take 1 tablet by mouth daily), Disp: 90 Tablet, Rfl: 0    tamsulosin (FLOMAX) 0.4 mg capsule, Take 1 Capsule by mouth daily. , Disp: 30 Capsule, Rfl: 2    eplerenone (INSPRA) 50 mg tab tablet, Take 0.5 Tablets by mouth daily. (Patient taking differently: Take 25 mg by mouth daily. take 1/2  tab po daily), Disp: 90 Tablet, Rfl: 1    hydrALAZINE (APRESOLINE) 50 mg tablet, Take 1 Tablet by mouth three (3) times daily. (Patient taking differently: Take 50 mg by mouth two (2) times a day.), Disp: 180 Tablet, Rfl: 2    docusate sodium (COLACE) 100 mg capsule, Take 1 Capsule by mouth daily for 90 days. , Disp: 30 Capsule, Rfl: 2    famotidine (PEPCID) 20 mg tablet, Take 1 Tablet by mouth every twelve (12) hours. , Disp: 60 Tablet, Rfl: 2    magnesium oxide (MAG-OX) 400 mg tablet, Take 1 Tablet by mouth daily. , Disp: 30 Tablet, Rfl: 2    mirtazapine (REMERON) 7.5 mg tablet, Take 1 Tablet by mouth nightly., Disp: 30 Tablet, Rfl: 2    sucralfate (CARAFATE) 1 gram tablet, Take 1 Tablet by mouth Before breakfast, lunch, dinner and at bedtime. , Disp: 120 Tablet, Rfl: 2    warfarin (COUMADIN) 2.5 mg tablet, Take 2 Tablets by mouth daily. , Disp: 90 Tablet, Rfl: 2    potassium chloride SR (K-TAB) 20 mEq tablet, Take 2 Tablets by mouth two (2) times a day., Disp: 360 Tablet, Rfl: 1    metFORMIN (GLUCOPHAGE) 500 mg tablet, Take 1 Tablet by mouth two (2) times daily (with meals). , Disp: 180 Tablet, Rfl: 3    acetaminophen (TYLENOL EXTRA STRENGTH) 500 mg tablet, Take 500 mg by mouth every six (6) hours as needed for Fever or Pain., Disp: , Rfl:     aspirin delayed-release 81 mg tablet, Take 81 mg by mouth daily. , Disp: , Rfl:     PATIENT CARE TEAM:  Patient Care Team:  Dena Mensah MD as PCP - General (Internal Medicine Physician)  Dena Mensah MD as PCP - [facility] Provider  Tyrel Menjivar MD as Physician (Cardiovascular Disease Physician)  Ann Villalta MD as Physician (Gastroenterology)  Zack Otoole MD as Physician (Orthopedic Surgery)  Ruthie Diop MD as Physician (Ophthalmology)  Mary Spurling, MD (Cardiovascular Disease Physician)  Carley Gomez MD (Cardiovascular Disease Physician)     Thank you for allowing us to participate in this patient's care.     Kelly Lange NP  [address] [place] [address], [address]  Phone: [phone]

## 2022-05-19 ENCOUNTER — HOME CARE VISIT (OUTPATIENT)
Dept: SCHEDULING | Facility: HOME HEALTH | Age: 77
End: 2022-05-19
Payer: MEDICARE

## 2022-05-19 PROCEDURE — G0299 HHS/HOSPICE OF RN EA 15 MIN: HCPCS

## 2022-05-20 VITALS
RESPIRATION RATE: 20 BRPM | OXYGEN SATURATION: 98 % | BODY MASS INDEX: 22.03 KG/M2 | WEIGHT: 167 LBS | TEMPERATURE: 97.2 F

## 2022-05-23 ENCOUNTER — TELEPHONE ANTICOAG (OUTPATIENT)
Dept: CARDIOLOGY CLINIC | Age: 77
End: 2022-05-23

## 2022-05-23 ENCOUNTER — HOME CARE VISIT (OUTPATIENT)
Dept: SCHEDULING | Facility: HOME HEALTH | Age: 77
End: 2022-05-23
Payer: MEDICARE

## 2022-05-23 VITALS — OXYGEN SATURATION: 98 % | RESPIRATION RATE: 18 BRPM | TEMPERATURE: 98.3 F | HEART RATE: 82 BPM

## 2022-05-23 LAB — INR, EXTERNAL: 1.9

## 2022-05-23 PROCEDURE — G0299 HHS/HOSPICE OF RN EA 15 MIN: HCPCS

## 2022-05-23 PROCEDURE — G0151 HHCP-SERV OF PT,EA 15 MIN: HCPCS

## 2022-05-24 ENCOUNTER — DOCUMENTATION ONLY (OUTPATIENT)
Dept: CARDIOLOGY CLINIC | Age: 77
End: 2022-05-24

## 2022-05-24 VITALS
OXYGEN SATURATION: 98 % | BODY MASS INDEX: 22.43 KG/M2 | WEIGHT: 170 LBS | TEMPERATURE: 97.2 F | RESPIRATION RATE: 20 BRPM

## 2022-05-24 NOTE — PROGRESS NOTES
Labs and Adrian Sotomayor reviewed. Notable for decreased thoracic impedence. proBNP and Cr stable at baseline. Pt scheduled for clinic visit 5/26; will assess in person at that time.

## 2022-05-25 ENCOUNTER — TELEPHONE (OUTPATIENT)
Dept: CARDIOLOGY CLINIC | Age: 77
End: 2022-05-25

## 2022-05-25 ENCOUNTER — HOME CARE VISIT (OUTPATIENT)
Dept: SCHEDULING | Facility: HOME HEALTH | Age: 77
End: 2022-05-25
Payer: MEDICARE

## 2022-05-25 VITALS — OXYGEN SATURATION: 99 % | TEMPERATURE: 97.8 F

## 2022-05-25 PROCEDURE — G0151 HHCP-SERV OF PT,EA 15 MIN: HCPCS

## 2022-05-25 NOTE — PATIENT INSTRUCTIONS
Medication changes:  Change Hydralazine dose to 25 mg (1/2 of 50 mg tablet) three times daily. Apply an over the counter Lidocaine patch to area of left rib pain. (Apply as directed on package insert). You may take Tylenol 650 mg every 4-6 hours as needed for left rib pain. Do not take more than 3000 mg in a 24 hour period. Testing Ordered: An order for Echo has been placed to be done next week. You will be receiving an automated call from Coordination of Care to schedule this test. If you are unavailable to receive the call or would like to contact coordination of care yourself you may contact [phone] to schedule. You will need to contact coordination of care yourself if you miss their calls as they will only make 3 attempts to reach you. A chest xray has been ordered to be done next week. Please present to any [address] [place] [address] Registration to complete this test. This is a walk up test and does not need to be scheduled. 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 Your urine should be clear and light yellow straw colored. Follow up in 1 week with Rudolph Mccloud    For further patient and caregiver resources as well as access to online LVAD support groups visit [email or web address]       Please bring your daily sheets and medications to your next appointment. Please monitor your weights daily upon waking and after using the bathroom. Keep a written records of your weights and bring to your next appointment. If you have a weight gain of 3 or more pounds overnight OR 5 or more pounds in one week please contact our office.        Thank you for allowing us the privilege of being a part of your healthcare team! Please do not hesitate to contact our office at [phone] with any questions or concerns. Virtual Heart Failure [address] invites you to learn more about heart failure and to share your questions, ideas, and experiences with others. Each month, the Heart Failure Support Group features a new educational topic and a guest speaker, followed by an interactive discussion. Our Heart Failure Nurse Navigator will moderate each session. You will be able to participate by phone, tablet or computer through [address] [place] [address]. This support group takes place on the 3rd Thursday of each month from 6:00-7:30PM. All individuals living with heart failure and their caregivers are welcome to join. If you are interested in participating, please contact us at [email or web address] and you will be sent the link to join the [employer].

## 2022-05-25 NOTE — TELEPHONE ENCOUNTER
Telephone Call RE:  Appointment reminder     Outcome:     [x] Patient confirmed appointment   [] Patient rescheduled appointment for    [] Unable to reach  [] Left message             [] Other:     ---------------------             [x] Screened for [facility]

## 2022-05-26 ENCOUNTER — OFFICE VISIT (OUTPATIENT)
Dept: CARDIOLOGY CLINIC | Age: 77
End: 2022-05-26
Payer: MEDICARE

## 2022-05-26 ENCOUNTER — TELEPHONE (OUTPATIENT)
Dept: CARDIOLOGY CLINIC | Age: 77
End: 2022-05-26

## 2022-05-26 VITALS
WEIGHT: 173 LBS | BODY MASS INDEX: 22.93 KG/M2 | HEIGHT: 73 IN | HEART RATE: 70 BPM | SYSTOLIC BLOOD PRESSURE: 70 MMHG | TEMPERATURE: 97.9 F | RESPIRATION RATE: 16 BRPM | OXYGEN SATURATION: 100 %

## 2022-05-26 DIAGNOSIS — I50.9 CHRONIC CONGESTIVE HEART FAILURE, UNSPECIFIED HEART FAILURE TYPE (HCC): Primary | ICD-10-CM

## 2022-05-26 DIAGNOSIS — Z95.811 LVAD (LEFT VENTRICULAR ASSIST DEVICE) PRESENT (HCC): ICD-10-CM

## 2022-05-26 PROCEDURE — G8427 DOCREV CUR MEDS BY ELIG CLIN: HCPCS | Performed by: NURSE PRACTITIONER

## 2022-05-26 PROCEDURE — 1111F DSCHRG MED/CURRENT MED MERGE: CPT | Performed by: NURSE PRACTITIONER

## 2022-05-26 PROCEDURE — G8536 NO DOC ELDER MAL SCRN: HCPCS | Performed by: NURSE PRACTITIONER

## 2022-05-26 PROCEDURE — G8432 DEP SCR NOT DOC, RNG: HCPCS | Performed by: NURSE PRACTITIONER

## 2022-05-26 PROCEDURE — 93750 INTERROGATION VAD IN PERSON: CPT | Performed by: NURSE PRACTITIONER

## 2022-05-26 PROCEDURE — 1123F ACP DISCUSS/DSCN MKR DOCD: CPT | Performed by: NURSE PRACTITIONER

## 2022-05-26 PROCEDURE — 99215 OFFICE O/P EST HI 40 MIN: CPT | Performed by: NURSE PRACTITIONER

## 2022-05-26 PROCEDURE — 1101F PT FALLS ASSESS-DOCD LE1/YR: CPT | Performed by: NURSE PRACTITIONER

## 2022-05-26 PROCEDURE — G8420 CALC BMI NORM PARAMETERS: HCPCS | Performed by: NURSE PRACTITIONER

## 2022-05-26 NOTE — PROGRESS NOTES
Beverly Colindres is a 69 y.o. male with a history of ICM with Heartmate 3 implant date of 4/5/22. Patient was seen in clinic for routine follow up with Rosa Albright NP. Drive line dressing change completed per policy. No signs or symptoms of infection noted. Reviewed medication changes. Patient verbalized understanding. Reviewed sterile driveline dressing change with patient's wife.    Rachael Cortez RN  VAD Coordinator

## 2022-05-26 NOTE — PROGRESS NOTES
[facility] in [place], [facility] Note  NP Student     Patient name: Bhakti Salinas Sr  Patient : 1945  Patient MRN: [id number]  Date of service: 22    Primary care physician: Radha Belle MD  Primary general cardiologist:      Primary [facility] cardiologist: Angela Jameson MD    CHIEF COMPLAINT:  Routine LVAD Visit     PLAN OF CARE:  · 69 y. o. male with severe ischemic cardiomyopathy, LVEF 15-20% s/p HM3 LVAD implant as DT on 22 by Dr. Breann Andrade c/b intraoperative bleeding requiring multiple blood products and subsequent RV failure with severe TR requiring prolonged dobutamine wean. · Will need surveillance monthly echos to monitor TR, chest xrays to monitor pleural effusion, and nutritional support with weekly prealbumins and iron profiles. · Concern patient may be slightly dehydrated. Orthostatics: Laying BP 72, HR 52; Sitting BP 70 HR 50; Standing BP  78;  HR 81.     RECOMMENDATIONS:  TTE  shows improvement in TR, TAPSE 1.2 cm; Echo next week  Continue speed 4800rpms, low speed 4400  No BB due to RV failure   Continue Entresto at reduced dose, 1/2 tab 49/51 mg PO BID, can send rx for 24/26mg strength tabs with next refill  Cont Eplerenone 25mg daily  Bumex 1 mg PO daily     Continue to hold Adonica Cordial, consider restarting at next visit  Decrease hydralazine 25 mg PO q8h  Continue warfarin, goal INR 2-3  ASA 81mg daily  Continue bowel regimen   Continue Flomax   LVAD labs, add Vitamin D and Iron panel with next labs  Driveline dressing changes 3 times a week  CPAP qHS   Advanced care plan forms on file   Strict I/O, daily weights, Na+ restricted diet   Continue VAD education   Continue HH PT/OT signed off   Chest X-Ray next week  Follow up in clinic in 1 week   Lidocaine patch to left ribs for rubbing pain likely associated with VAD rubbing.       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  · Persistent large volume chest tube output  Chronic systolic heart failure  · Stage D, NYHA class IV symptoms  · Non-ischemic cardiomyopathy, LVEF 20% with LVEDD 6.2  · RV dysfunction  At risk of sudden cardiac death  · Recent cardiac arrest   · S/p ICD (1/2013, [address] followed by [facility]); New implant on 10/21/2021 [employer]  Coronary artery disease  · S/p multiple interventions  · S/p 4V CABG (8/2012)  · LHC (7/2019) severe stenosis of LIMA to LAD anastomosis site, SVG graft to OM is occluded, SVG graft to RCA 40-50%, LAD occluded proximally, severe proximal LCx, RCA is the long . · PET (6/2019) EF 24% with anterior lateral and inferior reversible defect  Cardiac risk factors  · HTN  · HL  · DM2  · SRTUHI on CPAP  · MIld carotid stenosis  Anemia, microcytic  · Iron deficiency  DVT with filter  Pulmonary nodules   Dysphagia     CARDIAC IMAGING AND DIAGNOSTICS REVIEWED:  Echo 5/4/22 LVEF 15-20%, mod TR, LVIDd 5.3 cm, TAPSE 0.9 cm   Echo 4/26/22 - LVIDd 4.9 cm, TAPSE 0.5 cm,   Echo 4/18/22- LVEF 15-20%, severe TR, TAPSE 0.5cm, RVIDd 6cm   Echo (4/8/22)    Left Ventricle: Left ventricle size is normal. Moderately increased wall thickness. Findings consistent with concentric remodeling. Severe global hypokinesis present. Severely reduced left ventricular systolic function with a visually estimated EF of 10 -15%. 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   Aortic Valve: Moderate sclerosis of the aortic valve cusp.   Mitral Valve: Moderately thickened leaflet. Mild transvalvular regurgitation.   Tricuspid Valve: Severe transvalvular regurgitation.   Right Atrium: Right atrium is severely dilated.     Echo (3/2/22)   · LV 10-15%  · Mod-severe MR      Echo (11/23/21):  · LV 15-20%.    · Mod-severe, MR, mod-TR     Echo (5/20/21)  · LV: Estimated LVEF is 20 - 25%. Normal wall thickness. Mildly dilated left ventricle.  Severely and globally reduced systolic function. Severe (grade 4) left ventricular diastolic dysfunction. · LA: Severely dilated left atrium. · RA: Severely dilated right atrium. · MV: Moderate mitral valve regurgitation is present. · TV: Moderate tricuspid valve regurgitation is present. · AO: Mild aortic root dilatation. · RV: Pacer/ICD present. · LVED 6.2cm     EKG (5/20/21) SB with 1degree AV block, QRS 116ms     LHC (5/24/21) Native Coronaries: LM - moderate to severe distal disease 50%. % prox occluded (), heavily calcified, LCx: 80% proximal stenosis. OM1 is  (seen filling faintly via collaterals). OM2 99% stenosis. RCA: 100% proximal occluded.  LIMA to LAD: patent, supplies only a diagonal branch (probably D2). The LAD distal to the bifurcation is 100% occluded () and fills via right to left collaterals off the SVG to RCA. SVG to R-PDA: patent with moderate diffuse irregularities but no obstructive disease in the graft.    No appealing interventional targets. [address] [facility] Report 5/4/2022 5/2/2022 11/12/2021 7/6/2021 5/20/2021   (PRE) HR 82 82 88 98 74   (PRE) O2 Sat - 98 100 - 99   (POST) HR 91 88 102 - 81   (POST) O2 Sat - 98 99 98% on Ra 99   Distance in Meters 191.41 181.36 386. 62 - 1158.24         HISTORY OF PRESENT ILLNESS:  Lynsey Asencio is a 69 y.o. male with h/o HTN, HL, DM2, SRUTHI on CPAP, chronic systolic heart failure, stage D, NYHA class IV symptoms, non-ischemic cardiomyopathy, LVEF 20% with LVEDD 6.2, RV dysfunciton, TAPSE 1.22, TBili 1.5 likely from cardiac congestion, recent cardiac arrest s/p ICD (1/2013, [address] followed by [facility]), coronary artery disease s/p multiple interventions s/p 4V CABG (8/2012), LHC (7/2019) severe stenosis of LIMA to LAD anastomosis site, SVG graft to OM is occluded, SVG graft to RCA 40-50%, LAD occluded proximally, severe proximal LCx, RCA is the long . PET (6/2019) EF 24% with anterior lateral and inferior reversible defect.  Anemia, microcytic with iron deficiency, DVT with filter.     Patient was referred to [facility] by Dr. Perla Anders 2021 for evaluation of his candidacy for advanced therapies.     Patient agreed to inpatient initiation of palliative infusion of chronic inotropes and evaluation for LVAD-DT. Patient was admitted 5/2-5/25/21. Patient was started on milrinone infusion and had first part of LVAD evaluation completed. Right and left heart cath on 5/24/21 showed severe diffuse native vessel CAD, with patent grafts (LIMA to D2, SVG to RCA).  Pt was found to have pulmonary nodule during workup.  Antiplatelet therapy was held during hospitalization and after discharge due to anticipated pulmonary nodule biopsy, bone marrow biopsy and EGD/C-Scope as part of LVAD workup.  Pt found to have adenocarcinoma of the lung, and so LVAD was deferred pending treatment. Patient completed radiation treatment for adenocarcinoma of the lung. Hem-onc cleared patient for VAD implant with 90% 2 year prognosis.      He presented to [facility] on 4/3 for admission for planned LVAD implant which he underwent on 4/5/22.  Was re-do sternotomy, bleeding intra-op, required cryo, k-centra, FFP, Plt, and cellsaver in OR.  Had RV dysfunction noted intra-op; requiring lower VAD speed and dual inotrope support. 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 He also had issues with significant CT drainage requiring prolonged placement. He was eventually discharged home on POD 29 with GDMT (no BB due to RV failure) with [facility] SN/PT and OT. INTERVAL HISTORY:  Patient presented to clinical for routine LVAD follow up. Patient arrived to appointment in wheelchair accompanied by his wife. Stated he is still having significant shortness of breath when walking long distances. Noted to have exertional shortness of breath changing positions within exam room. Patient has complaints of left sided rib pain, described as \"rubbing. \" He has taken tylenol for the past 2 days with some relief. He continues to struggle with weight gain, despite increased nutritional supplementation. He denies chest pain or palpitations. No cough, fever, or chills, though reports he is always cold. No sick contacts. REVIEW OF SYSTEMS:  General: Denies fever, chills or weight loss. Constantly cold. Cardiovascular: see above in the interval history  Respiratory: Denies cough, wheezing, sputum production, hemoptysis. Positive for exertional shortness of breath. Gastrointestinal: Denies abdominal pain, nausea, vomiting, diarrhea, or heartburn. Kidney and bladder: Denies painful urination, frequent urination, urgency  Neurological: Positive for generalized muscle weakness; negative for dizziness      PHYSICAL EXAM:  Visit Vitals  BP (!) 68/0 (BP 1 Location: Right arm, BP Patient Position: Sitting, BP Cuff Size: Adult)   Pulse (!) 46   Temp 97.9 °F (36.6 °C) (Oral)   Resp 16   Ht 6' 1\" (1.854 m)   Wt 173 lb (78.5 kg)   SpO2 100%   BMI 22.82 kg/m²     General: normal appearance, no acute distress  HEENT: NT/AT. JVD: Cannot be appreciated   Lungs: normal pulmonary effort, no acute distress. Heart: Regular rate and rhythm. No pulse LVAD hum noted on auscultation  Abdomen: Soft, nontender  Genitourinary and rectal: deferred  Extremities: No cyanosis, clubbing, or edema. Neurologic: No focal sensory or motor deficits are noted. Grossly intact. Psychiatric: Awake, alert an doriented x 3. Appropriate mood and affect. Skin: Warm, dry and well perfused. No lesions, nodules or rashes are noted.     PAST MEDICAL HISTORY:  Past Medical History:   Diagnosis Date    CAD (coronary artery disease) '90 '97, '13 x 2    MI, code ice in 2013 at [facility]    Calculus of kidney     Colonic polyps     Congestive heart failure, unspecified     Diabetes ([facility] [address]     Gastritis     Hypercholesterolemia     Sleep apnea        PAST SURGICAL HISTORY:  Past Surgical History:   Procedure Laterality Date    COLONOSCOPY 04/06/2011    16, due 21    COLONOSCOPY N/A 3/2/2022    COLONOSCOPY    :- performed by Iman Robledo MD at [address] [place] [address] ENDOSCOPY    ENDOSCOPY, COLON, DIAGNOSTIC      11, due 16    HX CORONARY ARTERY BYPASS GRAFT  8/22/12    x 4 vessel by MISSY CABRALES HEENT      LASIK    HX PACEMAKER PLACEMENT  1/30/13    NC CARDIAC SURG PROCEDURE UNLIST  2012    x 4 vessels       FAMILY HISTORY:  Family History   Problem Relation Age of Onset    Heart Disease Mother         MI    Heart Disease Father         CAD & PVD    Lung Disease Father     Cancer Father         lung    Diabetes Maternal Grandmother     Heart Disease Other         CAD    Stroke Sister        SOCIAL HISTORY:  Social History     Socioeconomic History    Marital status:    Tobacco Use    Smoking status: Never Smoker    Smokeless tobacco: Never Used   Vaping Use    Vaping Use: Never used   Substance and Sexual Activity    Alcohol use: Yes     Alcohol/week: 0.0 standard drinks     Comment:  VERY RARE    Drug use: No       LABORATORY RESULTS:  Labs Latest Ref Rng & Units 5/6/2022 5/5/2022 5/4/2022 5/3/2022 5/2/2022 5/1/2022 4/30/2022   WBC 4.1 - 11.1 K/uL 6.0 6.0 6.5 5.3 5.7 5.5 5.6   RBC 4.10 - 5.70 M/uL 3.37(L) 3.19(L) 3.19(L) 3.19(L) 3.08(L) 2.92(L) 2.95(L)   Hemoglobin 12.1 - 17.0 g/dL 8.6(L) 8.2(L) 8.2(L) 8. 1(L) 7. 9(L) 7. 6(L) 7. 6(L)   Hematocrit 36.6 - 50.3 % 27. 6(L) 26. 0(L) 26. 4(L) 25. 7(L) 25. 8(L) 24. 7(L) 24. 6(L)   MCV 80.0 - 99.0 FL 81.9 81.5 82.8 80.6 83.8 84.6 83.4   Platelets 315 - 808 K/uL 358 326 293 315 298 274 275   Lymphocytes 12 - 49 % - - - - - - -   Monocytes 5 - 13 % - - - - - - -   Eosinophils 0 - 7 % - - - - - - -   Basophils 0 - 1 % - - - - - - -   Bands 0 - 6 % - - - - - - -   Albumin 3.5 - 5.0 g/dL 2. 9(L) 2. 9(L) 3.0(L) 2. 8(L) 2. 9(L) 2. 6(L) 2. 8(L)   Calcium 8.5 - 10.1 MG/DL 8.9 8.6 8.4(L) 8.6 8.2(L) 8.0(L) 7. 7(L)   Glucose 65 - 100 mg/dL 111(H) 100 166(H) 102(H) 95 199(H) 125(H)   BUN 6 - 20 MG/DL 25(H) 24(H) 20 21(H) 18 17 24(H)   Creatinine 0.70 - 1.30 MG/DL 0.93 0.97 0.92 0.86 0.86 0.86 1.01   Sodium 136 - 145 mmol/L 136 137 134(L) 136 138 133(L) 137   Potassium 3.5 - 5.1 mmol/L 3.7 3.5 3.8 3.6 3.6 3.7 4.0   TSH 0.36 - 3.74 uIU/mL - - - - - - -   LDH 85 - 241 U/L 172 172 170 175 163 177 158   Some recent data might be hidden       ALLERGY:  Allergies   Allergen Reactions    Oxycodone Anaphylaxis    Pcn [Penicillins] Hives        CURRENT MEDICATIONS:    Current Outpatient Medications:     xrgazqxra-GS-txvaijsa-guaifen (Mucinex Fast-Max Cold-Flu) 10- mg/20 mL liqd, Take 20 mL by mouth every four (4) hours as needed for Cough. Take 20 mL by mouth every 4 hours as needed for cough, Disp: , Rfl:     sacubitril-valsartan (ENTRESTO) 49 mg/51 mg tablet, Take 0.5 Tablets by mouth every twelve (12) hours. (Patient taking differently: Take 1 Tablet by mouth daily.), Disp: 60 Tablet, Rfl: 2    bumetanide (BUMEX) 2 mg tablet, Take 0.5 Tablets by mouth daily. (Patient taking differently: Take 1 Tablet by mouth daily. Take 1 tablet by mouth daily), Disp: 90 Tablet, Rfl: 0    tamsulosin (FLOMAX) 0.4 mg capsule, Take 1 Capsule by mouth daily. , Disp: 30 Capsule, Rfl: 2    eplerenone (INSPRA) 50 mg tab tablet, Take 0.5 Tablets by mouth daily. (Patient taking differently: Take 25 mg by mouth daily. take 1/2  tab po daily), Disp: 90 Tablet, Rfl: 1    hydrALAZINE (APRESOLINE) 50 mg tablet, Take 1 Tablet by mouth three (3) times daily. (Patient taking differently: Take 50 mg by mouth two (2) times a day.), Disp: 180 Tablet, Rfl: 2    docusate sodium (COLACE) 100 mg capsule, Take 1 Capsule by mouth daily for 90 days. , Disp: 30 Capsule, Rfl: 2    famotidine (PEPCID) 20 mg tablet, Take 1 Tablet by mouth every twelve (12) hours. , Disp: 60 Tablet, Rfl: 2    magnesium oxide (MAG-OX) 400 mg tablet, Take 1 Tablet by mouth daily. , Disp: 30 Tablet, Rfl: 2    mirtazapine (REMERON) 7.5 mg tablet, Take 1 Tablet by mouth nightly., Disp: 30 Tablet, Rfl: 2    sucralfate (CARAFATE) 1 gram tablet, Take 1 Tablet by mouth Before breakfast, lunch, dinner and at bedtime. , Disp: 120 Tablet, Rfl: 2    warfarin (COUMADIN) 2.5 mg tablet, Take 2 Tablets by mouth daily. , Disp: 90 Tablet, Rfl: 2    potassium chloride SR (K-TAB) 20 mEq tablet, Take 2 Tablets by mouth two (2) times a day., Disp: 360 Tablet, Rfl: 1    metFORMIN (GLUCOPHAGE) 500 mg tablet, Take 1 Tablet by mouth two (2) times daily (with meals). , Disp: 180 Tablet, Rfl: 3    acetaminophen (TYLENOL EXTRA STRENGTH) 500 mg tablet, Take 500 mg by mouth every six (6) hours as needed for Fever or Pain., Disp: , Rfl:     aspirin delayed-release 81 mg tablet, Take 81 mg by mouth daily. , Disp: , Rfl:     Thank you for your referral and allowing me to participate in this patient's care.     Babs Baig, RN, BSN, AG-ACNP Student     PATIENT CARE TEAM:  Patient Care Team:  Alvenia Apley, MD as PCP - General (Internal Medicine Physician)  Alvenia Apley, MD as PCP - REHABILITATION [facility] Empaneled Provider  Galileo Lowe MD as Physician (Cardiovascular Disease Physician)  Stella Dangelo MD as Physician (Gastroenterology)  Mark Chavez MD as Physician (Orthopedic Surgery)  Wade Mccord MD as Physician (Ophthalmology)  Ryan Swain MD (Cardiovascular Disease Physician)  Alicia Wesley MD (Cardiovascular Disease Physician)     Total visit time: 40 minutes (> 50% spent face-to-face counseling)

## 2022-05-26 NOTE — TELEPHONE ENCOUNTER
Called and spoke with patient's wife. Glenny Liang verbalized understanding of the following appointments:  [facility] follow up with Neisha Carrera NP 6/2/22 @ 1PM  Echo 6/2/22 @ 10 (arrival time of 0930)  Chest Xray - Walkup at Outpatient registration. No further questions at this time.

## 2022-05-26 NOTE — PROGRESS NOTES
[facility] in [facility],  [address] Visit      Patient name: Olga Vizcaino  Patient : 1945  Patient MRN: [id number]  Date of service: 22    Chief Complaint   Patient presents with    Follow-up     LVAD    Fatigue       PLAN OF CARE:  · 69 y.o. male with severe ischemic cardiomyopathy, LVEF 15-20% s/p HM3 LVAD implant as DT on 22 by Dr. Ankush Levy c/b intraoperative bleeding requiring multiple blood products and subsequent RV failure with severe TR requiring prolonged dobutamine wean and long term CT support   · Will need surveillance monthly echos to monitor TR, chest xrays to monitor pleural effusion, and aggressive PT/OT and nutritional support with weekly prealbumins and iron profiles    RECOMMENDATIONS:  TTE  shows improvement in TR, TAPSE 1.2 cm  Continue speed 4800rpms, low speed 4400  No BB due to RV failure   Continue Entresto at reduced dose, 1/2 tab 49/51 mg PO BID, can send rx for 24/26mg strength tabs with next refill  Cont Eplerenone 25mg daily  Bumex 1 mg PO daily     Cont to hold Farxiga 1 more week, consider restarting at next visit  Continue hydralazine 50 mg PO q8h  Completed course of amiodarone   Continue warfarin, goal INR 2-3  ASA 81mg daily  Continue bowel regimen   Continue Flomax   Orthostatics today are negative   Weekly prealbumin and iron profile with  labs   Driveline dressing changes 3 times a week  CPAP qHS   Advanced care plan forms on file   Strict I/O, daily weights, Na+ restricted diet   Continue VAD education   Continue  SN, completed PT and OT  CXR and TTE next week   Follow up in clinic in 1 week       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  · Persistent large volume chest tube output  Chronic systolic heart failure  · Stage D, NYHA class IV symptoms  · Non-ischemic cardiomyopathy, LVEF 20% with LVEDD 6.2  · RV dysfunction  At risk of sudden cardiac death  · Recent cardiac arrest   · S/p ICD (1/2013, [employer] followed by [facility]); New implant on 10/21/2021 [employer]  Coronary artery disease  · S/p multiple interventions  · S/p 4V CABG (8/2012)  · LHC (7/2019) severe stenosis of LIMA to LAD anastomosis site, SVG graft to OM is occluded, SVG graft to RCA 40-50%, LAD occluded proximally, severe proximal LCx, RCA is the long . · PET (6/2019) EF 24% with anterior lateral and inferior reversible defect  Cardiac risk factors  · HTN  · HL  · DM2  · SRUTHI on CPAP  · MIld carotid stenosis  Anemia, microcytic  · Iron deficiency  DVT with filter  Pulmonary nodules   Dysphagia       LIFE GOALS:  Patient goals reviewed and discussed with patient     CARDIAC IMAGING AND DIAGNOSTICS REVIEWED:  Echo 5/4/22 LVEF 15-20%, mod TR, LVIDd 5.3 cm, TAPSE 0.9 cm   Echo 4/26/22 - LVIDd 4.9 cm, TAPSE 0.5 cm,   Echo 4/18/22- LVEF 15-20%, severe TR, TAPSE 0.5cm, RVIDd 6cm   Echo (4/8/22)    Left Ventricle: Left ventricle size is normal. Moderately increased wall thickness. Findings consistent with concentric remodeling. Severe global hypokinesis present. Severely reduced left ventricular systolic function with a visually estimated EF of 10 -15%. 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   Aortic Valve: Moderate sclerosis of the aortic valve cusp.   Mitral Valve: Moderately thickened leaflet. Mild transvalvular regurgitation.   Tricuspid Valve: Severe transvalvular regurgitation.   Right Atrium: Right atrium is severely dilated. Echo (3/2/22)   · LV 10-15%  · Mod-severe MR      Echo (11/23/21):  · LV 15-20%. · Mod-severe, MR, mod-TR     Echo (5/20/21)  · LV: Estimated LVEF is 20 - 25%. Normal wall thickness. Mildly dilated left ventricle. Severely and globally reduced systolic function. Severe (grade 4) left ventricular diastolic dysfunction. · LA: Severely dilated left atrium.   · RA: Severely dilated right atrium. · MV: Moderate mitral valve regurgitation is present. · TV: Moderate tricuspid valve regurgitation is present. · AO: Mild aortic root dilatation. · RV: Pacer/ICD present. · LVED 6.2cm     EKG (5/20/21) SB with 1degree AV block, QRS 116ms     LHC (5/24/21) Native Coronaries: LM - moderate to severe distal disease 50%. % prox occluded (), heavily calcified, LCx: 80% proximal stenosis. OM1 is  (seen filling faintly via collaterals). OM2 99% stenosis. RCA: 100% proximal occluded. LIMA to LAD: patent, supplies only a diagonal branch (probably D2). The LAD distal to the bifurcation is 100% occluded () and fills via right to left collaterals off the SVG to RCA. SVG to R-PDA: patent with moderate diffuse irregularities but no obstructive disease in the graft. No appealing interventional targets. [address] Marlen Reilly Report 5/4/2022 5/2/2022 11/12/2021 7/6/2021 5/20/2021   (PRE) HR 82 82 88 98 74   (PRE) O2 Sat - 98 100 - 99   (POST) HR 91 88 102 - 81   (POST) O2 Sat - 98 99 98% on Ra 99   Distance in Meters 191.41 181.36 386. 62 - 1158.24         BRIEF HISTORY OF PRESENT ILLNESS:  Juan Manuel Rea is a 69 y.o. male with h/o HTN, HL, DM2, SRUTHI on CPAP, chronic systolic heart failure, stage D, NYHA class IV symptoms, non-ischemic cardiomyopathy, LVEF 20% with LVEDD 6.2, RV dysfunciton, TAPSE 1.22, TBili 1.5 likely from cardiac congestion, recent cardiac arrest s/p ICD (1/2013, [address] followed by [address] [place] [address]), coronary artery disease s/p multiple interventions s/p 4V CABG (8/2012), LHC (7/2019) severe stenosis of LIMA to LAD anastomosis site, SVG graft to OM is occluded, SVG graft to RCA 40-50%, LAD occluded proximally, severe proximal LCx, RCA is the long . PET (6/2019) EF 24% with anterior lateral and inferior reversible defect. Anemia, microcytic with iron deficiency, DVT with filter.      Patient was referred to [facility] by Dr. Bean Morales in 2021 for evaluation of his candidacy for advanced therapies. Patient agreed to inpatient initiation of palliative infusion of chronic inotropes and evaluation for LVAD-DT. Patient was admitted 5/2-5/25/21. Patient was started on milrinone infusion and had first part of LVAD evaluation completed. Right and left heart cath on 5/24/21 showed severe diffuse native vessel CAD, with patent grafts (LIMA to D2, SVG to RCA). Pt was found to have pulmonary nodule during workup. Antiplatelet therapy was held during hospitalization and after discharge due to anticipated pulmonary nodule biopsy, bone marrow biopsy and EGD/C-Scope as part of LVAD workup. Pt found to have adenocarcinoma of the lung, and so LVAD was deferred pending treatment. Patient completed radiation treatment for adenocarcinoma of the lung. Hem-onc cleared patient for VAD implant with 90% 2 year prognosis. He presented to [address] [place] [address] on 4/3 for admission for planned LVAD implant which he underwent on 4/5/22. Was re-do sternotomy, bleeding intra-op, required cryo, k-centra, FFP, Plt, and cellsaver in OR. Had RV dysfunction noted intra-op; requiring lower VAD speed and dual inotrope support. 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 He also had issues with significant CT drainage requiring prolonged placement. He was eventually discharged home on POD 29 with GDMT (no BB due to RV failure) with [place] SN/PT and OT. INTERVAL HISTORY:   Pt presents for LVAD follow up today accompanied by his wife. He states he feels well, he continues to struggle with fatigue and putting on weight. He has completed home PT and when he is discharged from [facility] will pursue cardiac rehab. His appetite is about the same, he does note some L sided pain near his pump site. He is compliant with his medications and is working on increasing his activity. REVIEW OF SYSTEMS:  Review of Systems   Constitutional: Positive for malaise/fatigue. Negative for chills, fever and weight loss.    Respiratory: Negative for cough and shortness of breath. Cardiovascular: Negative for chest pain, palpitations, orthopnea and leg swelling. Gastrointestinal: Negative for abdominal pain, constipation, diarrhea, heartburn, nausea and vomiting. Neurological: Positive for weakness. Negative for dizziness. Psychiatric/Behavioral: Negative for depression. PHYSICAL EXAM:  Visit Vitals  BP (!) 70/0   Pulse 70   Temp 97.9 °F (36.6 °C) (Oral)   Resp 16   Ht 6' 1\" (1.854 m)   Wt 173 lb (78.5 kg)   SpO2 100%   BMI 22.82 kg/m²       LVAD INTERROGATION:  Device interrogated in person  Device function normal, normal flow, no events  LVAD   Pump Speed (RPM): 4800  Pump Flow (LPM): 4.2  MAP: (P) 70  PI (Pulsitility Index): 3.9  Power: 3.2  Back Up  at Bedside & Labeled: (P) Yes  Driveline Site Care: (P) Yes  Driveline Dressing: (P) Changed per order  Outpatient: (P) Yes  MAP in Therapeutic Range (Outpatient): (P) Yes  Testing  Alarms Reviewed: (P) Yes  Back up SC speed: (P) 4800  Back up Low Speed Limit: (P) 4400      Physical Exam  Vitals reviewed. Constitutional:       General: He is not in acute distress. Appearance: Normal appearance. Cardiovascular:      Rate and Rhythm: Normal rate and regular rhythm. Heart sounds: Normal heart sounds. Comments: LVAD Hum noted on auscultation  Pulmonary:      Effort: Pulmonary effort is normal. No respiratory distress. Abdominal:      General: Bowel sounds are normal. There is no distension. Palpations: Abdomen is soft. Tenderness: There is no abdominal tenderness. Musculoskeletal:      Right lower leg: No edema. Left lower leg: No edema. Skin:     General: Skin is warm and dry. Capillary Refill: Capillary refill takes less than 2 seconds. Neurological:      General: No focal deficit present. Mental Status: He is alert and oriented to person, place, and time.    Psychiatric:         Mood and Affect: Mood normal.         Behavior: Behavior normal.         Thought Content: Thought content normal.         Judgment: Judgment normal.            PAST MEDICAL HISTORY:  Past Medical History:   Diagnosis Date    CAD (coronary artery disease) '90 '97, '13 x 2    MI, code ice in 2013 at [facility]    Calculus of kidney     Colonic polyps     Congestive heart failure, unspecified     Diabetes ([address]     Gastritis     Hypercholesterolemia     Sleep apnea        PAST SURGICAL HISTORY:  Past Surgical History:   Procedure Laterality Date    COLONOSCOPY  04/06/2011    16, due 21    COLONOSCOPY N/A 3/2/2022    COLONOSCOPY    :- performed by Kusum Marie [facility] at [facility] ENDOSCOPY    ENDOSCOPY, COLON, DIAGNOSTIC      11, due 16    HX CORONARY ARTERY BYPASS GRAFT  8/22/12    x 4 vessel by MISSY Ramirez    HX HEENT      LASIK    HX PACEMAKER PLACEMENT  1/30/13    NC CARDIAC SURG PROCEDURE UNLIST  2012    x 4 vessels       FAMILY HISTORY:  Family History   Problem Relation Age of Onset    Heart Disease Mother         MI    Heart Disease Father         CAD & PVD    Lung Disease Father     Cancer Father         lung    Diabetes Maternal Grandmother     Heart Disease Other         CAD    Stroke Sister        SOCIAL HISTORY:  Social History     Socioeconomic History    Marital status:    Tobacco Use    Smoking status: Never Smoker    Smokeless tobacco: Never Used   Vaping Use    Vaping Use: Never used   Substance and Sexual Activity    Alcohol use: Yes     Alcohol/week: 0.0 standard drinks     Comment:  VERY RARE    Drug use: No       LABORATORY RESULTS:     Labs Latest Ref Rng & Units 5/6/2022 5/5/2022 5/4/2022 5/3/2022 5/2/2022 5/1/2022 4/30/2022   WBC 4.1 - 11.1 K/uL 6.0 6.0 6.5 5.3 5.7 5.5 5.6   RBC 4.10 - 5.70 M/uL 3.37(L) 3.19(L) 3.19(L) 3.19(L) 3.08(L) 2.92(L) 2.95(L)   Hemoglobin 12.1 - 17.0 g/dL 8.6(L) 8.2(L) 8.2(L) 8. 1(L) 7. 9(L) 7. 6(L) 7. 6(L)   Hematocrit 36.6 - 50.3 % 27. 6(L) 26. 0(L) 26. 4(L) 25. 7(L) 25. 8(L) 24. 7(L) 24. 6(L)   MCV 80.0 - 99.0 FL 81.9 81.5 82.8 80.6 83.8 84.6 83.4   Platelets 570 - 358 K/uL 358 326 293 315 298 274 275   Lymphocytes 12 - 49 % - - - - - - -   Monocytes 5 - 13 % - - - - - - -   Eosinophils 0 - 7 % - - - - - - -   Basophils 0 - 1 % - - - - - - -   Bands 0 - 6 % - - - - - - -   Albumin 3.5 - 5.0 g/dL 2. 9(L) 2. 9(L) 3.0(L) 2. 8(L) 2. 9(L) 2. 6(L) 2. 8(L)   Calcium 8.5 - 10.1 MG/DL 8.9 8.6 8.4(L) 8.6 8.2(L) 8.0(L) 7. 7(L)   Glucose 65 - 100 mg/dL 111(H) 100 166(H) 102(H) 95 199(H) 125(H)   BUN 6 - 20 MG/DL 25(H) 24(H) 20 21(H) 18 17 24(H)   Creatinine 0.70 - 1.30 MG/DL 0.93 0.97 0.92 0.86 0.86 0.86 1.01   Sodium 136 - 145 mmol/L 136 137 134(L) 136 138 133(L) 137   Potassium 3.5 - 5.1 mmol/L 3.7 3.5 3.8 3.6 3.6 3.7 4.0   TSH 0.36 - 3.74 uIU/mL - - - - - - -   LDH 85 - 241 U/L 172 172 170 175 163 177 158   Some recent data might be hidden     Lab Results   Component Value Date/Time    TSH 2.26 04/10/2022 03:00 AM    TSH 1.68 03/01/2022 11:50 AM    TSH 1.47 05/26/2021 10:44 PM    TSH 1.97 05/20/2021 04:28 PM    TSH 1.41 02/09/2021 03:05 PM    TSH 1.740 08/14/2018 09:58 AM    TSH 1.710 10/18/2017 12:00 AM       ALLERGY:  Allergies   Allergen Reactions    Oxycodone Anaphylaxis    Pcn [Penicillins] Hives        CURRENT MEDICATIONS:    Current Outpatient Medications:     vnykkjmwb-KB-tmvrrtro-guaifen (Mucinex Fast-Max Cold-Flu) 10- mg/20 mL liqd, Take 20 mL by mouth every four (4) hours as needed for Cough. Take 20 mL by mouth every 4 hours as needed for cough, Disp: , Rfl:     sacubitril-valsartan (ENTRESTO) 49 mg/51 mg tablet, Take 0.5 Tablets by mouth every twelve (12) hours. (Patient taking differently: Take 1 Tablet by mouth daily.), Disp: 60 Tablet, Rfl: 2    bumetanide (BUMEX) 2 mg tablet, Take 0.5 Tablets by mouth daily. (Patient taking differently: Take 1 Tablet by mouth daily. Take 1 tablet by mouth daily), Disp: 90 Tablet, Rfl: 0    tamsulosin (FLOMAX) 0.4 mg capsule, Take 1 Capsule by mouth daily. , Disp: 30 Capsule, Rfl: 2    eplerenone (INSPRA) 50 mg tab tablet, Take 0.5 Tablets by mouth daily. (Patient taking differently: Take 25 mg by mouth daily. take 1/2  tab po daily), Disp: 90 Tablet, Rfl: 1    hydrALAZINE (APRESOLINE) 50 mg tablet, Take 1 Tablet by mouth three (3) times daily. (Patient taking differently: Take 50 mg by mouth two (2) times a day.), Disp: 180 Tablet, Rfl: 2    docusate sodium (COLACE) 100 mg capsule, Take 1 Capsule by mouth daily for 90 days. , Disp: 30 Capsule, Rfl: 2    famotidine (PEPCID) 20 mg tablet, Take 1 Tablet by mouth every twelve (12) hours. , Disp: 60 Tablet, Rfl: 2    magnesium oxide (MAG-OX) 400 mg tablet, Take 1 Tablet by mouth daily. , Disp: 30 Tablet, Rfl: 2    mirtazapine (REMERON) 7.5 mg tablet, Take 1 Tablet by mouth nightly., Disp: 30 Tablet, Rfl: 2    sucralfate (CARAFATE) 1 gram tablet, Take 1 Tablet by mouth Before breakfast, lunch, dinner and at bedtime. , Disp: 120 Tablet, Rfl: 2    warfarin (COUMADIN) 2.5 mg tablet, Take 2 Tablets by mouth daily. , Disp: 90 Tablet, Rfl: 2    potassium chloride SR (K-TAB) 20 mEq tablet, Take 2 Tablets by mouth two (2) times a day., Disp: 360 Tablet, Rfl: 1    metFORMIN (GLUCOPHAGE) 500 mg tablet, Take 1 Tablet by mouth two (2) times daily (with meals). , Disp: 180 Tablet, Rfl: 3    acetaminophen (TYLENOL EXTRA STRENGTH) 500 mg tablet, Take 500 mg by mouth every six (6) hours as needed for Fever or Pain., Disp: , Rfl:     aspirin delayed-release 81 mg tablet, Take 81 mg by mouth daily. , Disp: , Rfl:     PATIENT CARE TEAM:  Patient Care Team:  Leida Molina MD as PCP - General (Internal Medicine Physician)  Leida Molina MD as PCP - [facility]  Marilyn Aguillon MD as Physician (Cardiovascular Disease Physician)  Lucho Escalante MD as Physician (Gastroenterology)  Nieves Gilliland MD as Physician (Orthopedic Surgery)  Anderson Mae MD as Physician (Ophthalmology)  Salvador Green MD (Cardiovascular Disease Physician)  Ben Velazquez MD (Cardiovascular Disease Physician)     Thank you for allowing us to participate in this patient's care.     Colonel Derek NP  [address] [place] [address], [address]  Phone: [phone]

## 2022-05-27 ENCOUNTER — HOME CARE VISIT (OUTPATIENT)
Dept: SCHEDULING | Facility: HOME HEALTH | Age: 77
End: 2022-05-27
Payer: MEDICARE

## 2022-05-27 PROCEDURE — G0299 HHS/HOSPICE OF RN EA 15 MIN: HCPCS

## 2022-05-28 VITALS
TEMPERATURE: 97.2 F | RESPIRATION RATE: 20 BRPM | OXYGEN SATURATION: 98 % | WEIGHT: 171 LBS | BODY MASS INDEX: 22.56 KG/M2

## 2022-05-30 ENCOUNTER — HOME CARE VISIT (OUTPATIENT)
Dept: SCHEDULING | Facility: HOME HEALTH | Age: 77
End: 2022-05-30
Payer: MEDICARE

## 2022-05-30 PROCEDURE — G0299 HHS/HOSPICE OF RN EA 15 MIN: HCPCS

## 2022-05-31 ENCOUNTER — TELEPHONE ANTICOAG (OUTPATIENT)
Dept: CARDIOLOGY CLINIC | Age: 77
End: 2022-05-31

## 2022-05-31 LAB — INR, EXTERNAL: 1.9

## 2022-05-31 NOTE — PROGRESS NOTES
Moy Neal NP  You; Genevieve Martin 4 hours ago (12:09 PM)     LP    Increase to 7.5 mg tonight then 5 mg all other nights and repeat in 1 week     [address] with patient's wife. Pb Mayer states she has only been giving patient one 2.5 mg tablet  of Coumadin daily. Sheeba Frederick to increase to two 2.5 mg tablets (total of 5 mg) tonight.  Will review with Ranjan Raya NP.

## 2022-06-01 ENCOUNTER — TELEPHONE (OUTPATIENT)
Dept: CARDIOLOGY CLINIC | Age: 77
End: 2022-06-01

## 2022-06-01 NOTE — TELEPHONE ENCOUNTER
Telephone Call RE:  Appointment reminder     Outcome:     [] Patient confirmed appointment   [] Patient rescheduled appointment for    [] Unable to reach  [x] Left message             [] Other:     ---------------------             [] Screened for [facility]

## 2022-06-01 NOTE — PATIENT INSTRUCTIONS
Medication changes:  Reduce Entresto 49/51 to 1/2 tablet twice daily. Resume Farxiga 10 mg  - take 1/2 tablet at lunchtime. Reduce Hydralazine 50 mg to 1/4 tablet three times daily. Call if this is not possible. Please take this to your pharmacy to notify them of the change in medications. Testing Ordered: An order for Thoracentesis has been placed to be done as soon as possible. You will be receiving an automated call from Coordination of Care to schedule this test. If you are unavailable to receive the call or would like to contact coordination of care yourself you may contact [phone] to schedule. You will need to contact coordination of care yourself if you miss their calls as they will only make 3 attempts to reach you. Other Recommendations:   Use Incentive Spirometer 6 times daily for 5 times each. 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 Your urine should be clear and light yellow straw colored. Follow up in 2 weeks with Rudolph Mccloud      For further patient and caregiver resources as well as access to online LVAD support groups visit [email or web address]       Please bring your daily sheets and medications to your next appointment. Please monitor your weights daily upon waking and after using the bathroom. Keep a written records of your weights and bring to your next appointment. If you have a weight gain of 3 or more pounds overnight OR 5 or more pounds in one week please contact our office. Thank you for allowing us the privilege of being a part of your healthcare team! Please do not hesitate to contact our office at [phone] with any questions or concerns.        Virtual Heart Failure [address] invites you to learn more about heart failure and to share your questions, ideas, and experiences with others. Each month, the Heart Failure Support Group features a new educational topic and a guest speaker, followed by an interactive discussion. Our Heart Failure Nurse Navigator will moderate each session. You will be able to participate by phone, tablet or computer through [address] [place] [address]. This support group takes place on the 3rd Thursday of each month from 6:00-7:30PM. All individuals living with heart failure and their caregivers are welcome to join. If you are interested in participating, please contact us at [email or web address] and you will be sent the link to join the [employer].

## 2022-06-02 ENCOUNTER — OFFICE VISIT (OUTPATIENT)
Dept: CARDIOLOGY CLINIC | Age: 77
End: 2022-06-02
Payer: MEDICARE

## 2022-06-02 ENCOUNTER — HOSPITAL ENCOUNTER (OUTPATIENT)
Dept: GENERAL RADIOLOGY | Age: 77
Discharge: HOME | End: 2022-06-02
Attending: NURSE PRACTITIONER
Payer: MEDICARE

## 2022-06-02 ENCOUNTER — HOSPITAL ENCOUNTER (OUTPATIENT)
Dept: NON INVASIVE DIAGNOSTICS | Age: 77
Discharge: HOME | End: 2022-06-02
Attending: NURSE PRACTITIONER
Payer: MEDICARE

## 2022-06-02 ENCOUNTER — TELEPHONE (OUTPATIENT)
Dept: CARDIOLOGY CLINIC | Age: 77
End: 2022-06-02

## 2022-06-02 ENCOUNTER — TELEPHONE ANTICOAG (OUTPATIENT)
Dept: CARDIOLOGY CLINIC | Age: 77
End: 2022-06-02

## 2022-06-02 ENCOUNTER — TELEPHONE (OUTPATIENT)
Dept: INTERNAL MEDICINE CLINIC | Age: 77
End: 2022-06-02

## 2022-06-02 VITALS
RESPIRATION RATE: 20 BRPM | TEMPERATURE: 98.2 F | BODY MASS INDEX: 22.56 KG/M2 | OXYGEN SATURATION: 98 % | WEIGHT: 171 LBS

## 2022-06-02 VITALS — SYSTOLIC BLOOD PRESSURE: 70 MMHG | WEIGHT: 171.08 LBS | HEIGHT: 73 IN | BODY MASS INDEX: 22.67 KG/M2

## 2022-06-02 VITALS
BODY MASS INDEX: 23.46 KG/M2 | OXYGEN SATURATION: 100 % | WEIGHT: 177 LBS | HEIGHT: 73 IN | SYSTOLIC BLOOD PRESSURE: 66 MMHG | HEART RATE: 50 BPM | RESPIRATION RATE: 20 BRPM | TEMPERATURE: 96.1 F

## 2022-06-02 DIAGNOSIS — Z79.01 CHRONIC ANTICOAGULATION: Primary | ICD-10-CM

## 2022-06-02 DIAGNOSIS — J90 PLEURAL EFFUSION: ICD-10-CM

## 2022-06-02 DIAGNOSIS — I25.5 ISCHEMIC CARDIOMYOPATHY: ICD-10-CM

## 2022-06-02 DIAGNOSIS — I50.9 CHRONIC CONGESTIVE HEART FAILURE, UNSPECIFIED HEART FAILURE TYPE (HCC): ICD-10-CM

## 2022-06-02 DIAGNOSIS — Z95.811 LVAD (LEFT VENTRICULAR ASSIST DEVICE) PRESENT (HCC): ICD-10-CM

## 2022-06-02 DIAGNOSIS — I50.9 CHRONIC HEART FAILURE, UNSPECIFIED HEART FAILURE TYPE (HCC): ICD-10-CM

## 2022-06-02 LAB
ECHO AV PEAK GRADIENT: 14 MMHG
ECHO AV PEAK VELOCITY: 1.8 M/S
ECHO AV VELOCITY RATIO: 0.33
ECHO EST RA PRESSURE: 3 MMHG
ECHO LA DIAMETER INDEX: 1.84 CM/M2
ECHO LA DIAMETER: 3.7 CM
ECHO LA VOL 4C: 35 ML (ref 18–58)
ECHO LA VOLUME INDEX A4C: 17 ML/M2 (ref 16–34)
ECHO LV FRACTIONAL SHORTENING: 10 % (ref 28–44)
ECHO LV INTERNAL DIMENSION DIASTOLE INDEX: 2.44 CM/M2
ECHO LV INTERNAL DIMENSION DIASTOLIC: 4.9 CM (ref 4.2–5.9)
ECHO LV INTERNAL DIMENSION SYSTOLIC INDEX: 2.19 CM/M2
ECHO LV INTERNAL DIMENSION SYSTOLIC: 4.4 CM
ECHO LV IVSD: 1.2 CM (ref 0.6–1)
ECHO LV MASS 2D: 176 G (ref 88–224)
ECHO LV MASS INDEX 2D: 87.6 G/M2 (ref 49–115)
ECHO LV POSTERIOR WALL DIASTOLIC: 0.8 CM (ref 0.6–1)
ECHO LV RELATIVE WALL THICKNESS RATIO: 0.33
ECHO LVOT PEAK GRADIENT: 1 MMHG
ECHO LVOT PEAK VELOCITY: 0.6 M/S
ECHO RIGHT VENTRICULAR SYSTOLIC PRESSURE (RVSP): 22 MMHG
ECHO RV INTERNAL DIMENSION: 6 CM
ECHO RV TAPSE: 0.9 CM (ref 1.7–?)
ECHO TV REGURGITANT MAX VELOCITY: 2.17 M/S
ECHO TV REGURGITANT PEAK GRADIENT: 19 MMHG
INR BLD: 2.1
PT POC: NORMAL
VALID INTERNAL CONTROL?: YES

## 2022-06-02 PROCEDURE — 85610 PROTHROMBIN TIME: CPT | Performed by: NURSE PRACTITIONER

## 2022-06-02 PROCEDURE — 1101F PT FALLS ASSESS-DOCD LE1/YR: CPT | Performed by: NURSE PRACTITIONER

## 2022-06-02 PROCEDURE — G8536 NO DOC ELDER MAL SCRN: HCPCS | Performed by: NURSE PRACTITIONER

## 2022-06-02 PROCEDURE — 93306 TTE W/DOPPLER COMPLETE: CPT

## 2022-06-02 PROCEDURE — G8420 CALC BMI NORM PARAMETERS: HCPCS | Performed by: NURSE PRACTITIONER

## 2022-06-02 PROCEDURE — 93750 INTERROGATION VAD IN PERSON: CPT | Performed by: NURSE PRACTITIONER

## 2022-06-02 PROCEDURE — G8427 DOCREV CUR MEDS BY ELIG CLIN: HCPCS | Performed by: NURSE PRACTITIONER

## 2022-06-02 PROCEDURE — G8432 DEP SCR NOT DOC, RNG: HCPCS | Performed by: NURSE PRACTITIONER

## 2022-06-02 PROCEDURE — 71046 X-RAY EXAM CHEST 2 VIEWS: CPT

## 2022-06-02 PROCEDURE — 1111F DSCHRG MED/CURRENT MED MERGE: CPT | Performed by: NURSE PRACTITIONER

## 2022-06-02 PROCEDURE — 1123F ACP DISCUSS/DSCN MKR DOCD: CPT | Performed by: NURSE PRACTITIONER

## 2022-06-02 PROCEDURE — 99215 OFFICE O/P EST HI 40 MIN: CPT | Performed by: NURSE PRACTITIONER

## 2022-06-02 RX ORDER — HYDRALAZINE HYDROCHLORIDE 50 MG/1
TABLET, FILM COATED ORAL
Qty: 180 TABLET | Refills: 2
Start: 2022-06-02 | End: 2022-06-16 | Stop reason: SDUPTHER

## 2022-06-02 RX ORDER — HYDRALAZINE HYDROCHLORIDE 50 MG/1
25 TABLET, FILM COATED ORAL 3 TIMES DAILY
Qty: 180 TABLET | Refills: 2 | Status: SHIPPED | OUTPATIENT
Start: 2022-06-02 | End: 2022-06-02

## 2022-06-02 NOTE — PROGRESS NOTES
Emile Olszewski is a 69 y.o. male with a history of NICM with Heartmate 3 implant date of 4/5/2022. Patient was seen in clinic for routine follow up with Krista Arango NP. Drive line dressing change completed per policy. No signs or symptoms of infection noted. Reviewed medication changes with patient and patient's wife. Provided pill splitter to patient's wife to assist with medication changes. Reviewed daily maintenance for LVAD equipment. Patient verbalized understanding.    Danita Castellanos RN  VAD Coordinator

## 2022-06-02 NOTE — TELEPHONE ENCOUNTER
Pharmacy Progress Note - Telephone Encounter    Mr. Cici Ferguson Sr 69 y.o. male, referred by Dr. Clyde Ireland MD, was contacted via an outbound telephone call to discuss CGM set up and training today. Verified patients identifiers (name & ) per HIPAA policy. - Pt was offered CGM sample. Declined offer d/t cost.  He does not qualify to have CGM covered by Medicare. There are no discontinued medications. No orders of the defined types were placed in this encounter. Ree Pantoja, PharmD, [facility]  Clinical Pharmacist Specialist      For Pharmacy Admin Tracking Only     CPA in place:  Yes   Recommendation Provided To: Patient/Caregiver: 0 via Telephone   Intervention Accepted By: Patient/Caregiver: 0   Time Spent (min): 5

## 2022-06-02 NOTE — PROGRESS NOTES
[facility] in [place],  [address] Visit      Patient name: Pritesh Jiménez  Patient : 1945  Patient MRN: [id number]  Date of service: 22    Chief Complaint   Patient presents with    Follow-up     LVAD        PLAN OF CARE:  · 69 y.o. male with severe ischemic cardiomyopathy, LVEF 15-20% s/p HM3 LVAD implant as DT on 22 by Dr. Roxi Smith c/b intraoperative bleeding requiring multiple blood products and subsequent RV failure with severe TR requiring prolonged dobutamine wean and long term CT support   · Will need surveillance monthly echos to monitor TR, chest xrays to monitor pleural effusion, and aggressive PT/OT and nutritional support with weekly prealbumins and iron profiles    RECOMMENDATIONS:  TTE  shows improvement in TR, TAPSE 1.2 cm  Continue speed 4800rpms, low speed 4400  No BB due to RV failure   Continue Entresto at reduced dose, 1/2 tab 49/51 mg PO BID  Cont Eplerenone 25mg daily  Bumex 1 mg PO daily     Resume low dose Farxiga 5mg daily  Decrease hydralazine to 1/2 tab of 25 mg PO q8h  Continue warfarin, goal INR 2-3  ASA 81mg daily  Continue Flomax   Weekly prealbumin and iron profile with  labs   Driveline dressing changes 3 times a week  CPAP qHS   Advanced care plan forms on file   Strict I/O, daily weights, Na+ restricted diet   Continue VAD education   Continue HH SN, completed PT and OT  CXR monthy  Will schedule with IR for thoracentesis- will need coumadin adjusted   Follow up in clinic in 1 week       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  · Persistent large volume chest tube output  Chronic systolic heart failure  · Stage D, NYHA class IV symptoms  · Non-ischemic cardiomyopathy, LVEF 20% with LVEDD 6.2  · RV dysfunction  At risk of sudden cardiac death  · Recent cardiac arrest   · S/p ICD (2013, [employer] followed by [facility]);  New implant on 10/21/2021 [facility]  Coronary artery disease  · S/p multiple interventions  · S/p 4V CABG (8/2012)  · LHC (7/2019) severe stenosis of LIMA to LAD anastomosis site, SVG graft to OM is occluded, SVG graft to RCA 40-50%, LAD occluded proximally, severe proximal LCx, RCA is the long . · PET (6/2019) EF 24% with anterior lateral and inferior reversible defect  Cardiac risk factors  · HTN  · HL  · DM2  · SRUTHI on CPAP  · MIld carotid stenosis  Anemia, microcytic  · Iron deficiency  DVT with filter  Pulmonary nodules   Dysphagia       LIFE GOALS:  Patient goals reviewed and discussed with patient     CARDIAC IMAGING AND DIAGNOSTICS REVIEWED:  Echo 6/2/22 LVEF 20-25%, mild MR, mod to severe TR, LVIDd 4.9cm, RVIDd 6cm, TAPSE 0.9cm   Echo 5/4/22 LVEF 15-20%, mod TR, LVIDd 5.3 cm, TAPSE 0.9 cm   Echo 4/26/22 - LVIDd 4.9 cm, TAPSE 0.5 cm,   Echo 4/18/22- LVEF 15-20%, severe TR, TAPSE 0.5cm, RVIDd 6cm   Echo (4/8/22)    Left Ventricle: Left ventricle size is normal. Moderately increased wall thickness. Findings consistent with concentric remodeling. Severe global hypokinesis present. Severely reduced left ventricular systolic function with a visually estimated EF of 10 -15%. 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   Aortic Valve: Moderate sclerosis of the aortic valve cusp.   Mitral Valve: Moderately thickened leaflet. Mild transvalvular regurgitation.   Tricuspid Valve: Severe transvalvular regurgitation.   Right Atrium: Right atrium is severely dilated. Echo (3/2/22)   · LV 10-15%  · Mod-severe MR      Echo (11/23/21):  · LV 15-20%. · Mod-severe, MR, mod-TR     Echo (5/20/21)  · LV: Estimated LVEF is 20 - 25%. Normal wall thickness. Mildly dilated left ventricle. Severely and globally reduced systolic function. Severe (grade 4) left ventricular diastolic dysfunction. · LA: Severely dilated left atrium. · RA: Severely dilated right atrium.   · MV: Moderate mitral valve regurgitation is present. · TV: Moderate tricuspid valve regurgitation is present. · AO: Mild aortic root dilatation. · RV: Pacer/ICD present. · LVED 6.2cm     EKG (5/20/21) SB with 1degree AV block, QRS 116ms     LHC (5/24/21) Native Coronaries: LM - moderate to severe distal disease 50%. % prox occluded (), heavily calcified, LCx: 80% proximal stenosis. OM1 is  (seen filling faintly via collaterals). OM2 99% stenosis. RCA: 100% proximal occluded. LIMA to LAD: patent, supplies only a diagonal branch (probably D2). The LAD distal to the bifurcation is 100% occluded () and fills via right to left collaterals off the SVG to RCA. SVG to R-PDA: patent with moderate diffuse irregularities but no obstructive disease in the graft. No appealing interventional targets. 48 Marlen Reilly Report 5/4/2022 5/2/2022 11/12/2021 7/6/2021 5/20/2021   (PRE) HR 82 82 88 98 74   (PRE) O2 Sat - 98 100 - 99   (POST) HR 91 88 102 - 81   (POST) O2 Sat - 98 99 98% on Ra 99   Distance in Meters 191.41 181.36 386. 58 - 1158.24     CXR Results  (Last 48 hours)               06/02/22 1108  XR CHEST PA LAT Final result    Impression:      Increased left-sided pleural effusion and atelectasis. Narrative:  Clinical history: Hx of effusion, s/p LVAD   INDICATION:   Hx of effusion, s/p LVAD   COMPARISON: 5/10/2020       FINDINGS:    PA and lateral views of the chest are obtained. The cardiopericardial silhouette is stable in appearance status post anatomy. Cardiac pacer and left ventricular assist device redemonstrated. Increased   left-sided pleural effusion and atelectasis.                    BRIEF HISTORY OF PRESENT ILLNESS:  Bree Dill is a 69 y.o. male with h/o HTN, HL, DM2, SRUTHI on CPAP, chronic systolic heart failure, stage D, NYHA class IV symptoms, non-ischemic cardiomyopathy, LVEF 20% with LVEDD 6.2, RV dysfunciton, TAPSE 1.22, TBili 1.5 likely from cardiac congestion, recent cardiac arrest s/p ICD (1/2013, [employer] followed by [address] [place] [address]), coronary artery disease s/p multiple interventions s/p 4V CABG (8/2012), LHC (7/2019) severe stenosis of LIMA to LAD anastomosis site, SVG graft to OM is occluded, SVG graft to RCA 40-50%, LAD occluded proximally, severe proximal LCx, RCA is the long . PET (6/2019) EF 24% with anterior lateral and inferior reversible defect. Anemia, microcytic with iron deficiency, DVT with filter. Patient was referred to [facility] by Dr. Maite Cruz in 2021 for evaluation of his candidacy for advanced therapies. Patient agreed to inpatient initiation of palliative infusion of chronic inotropes and evaluation for LVAD-DT. Patient was admitted 5/2-5/25/21. Patient was started on milrinone infusion and had first part of LVAD evaluation completed. Right and left heart cath on 5/24/21 showed severe diffuse native vessel CAD, with patent grafts (LIMA to D2, SVG to RCA). Pt was found to have pulmonary nodule during workup. Antiplatelet therapy was held during hospitalization and after discharge due to anticipated pulmonary nodule biopsy, bone marrow biopsy and EGD/C-Scope as part of LVAD workup. Pt found to have adenocarcinoma of the lung, and so LVAD was deferred pending treatment. Patient completed radiation treatment for adenocarcinoma of the lung. Hem-onc cleared patient for VAD implant with 90% 2 year prognosis. He presented to [facility] on 4/3 for admission for planned LVAD implant which he underwent on 4/5/22. Was re-do sternotomy, bleeding intra-op, required cryo, k-centra, FFP, Plt, and cellsaver in OR. Had RV dysfunction noted intra-op; requiring lower VAD speed and dual inotrope support. 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 He also had issues with significant CT drainage requiring prolonged placement. He was eventually discharged home on POD 29 with GDMT (no BB due to RV failure) with [facility] SN/PT and OT.      INTERVAL HISTORY:   Pt presents for LVAD follow up today accompanied by his wife. He states he feels a little bit better this week but its been a long day today with his echo and cxr. His appetite is about the same, he has gained about 1-2 pounds, his cxr today shows an increase in pleural effusion and will need a thoracentesis. He is compliant with his medications and is working on increasing his activity. REVIEW OF SYSTEMS:  Review of Systems   Constitutional: Negative for chills, fever, malaise/fatigue and weight loss. Respiratory: Negative for cough and shortness of breath. Cardiovascular: Negative for chest pain, palpitations, orthopnea and leg swelling. Gastrointestinal: Negative for abdominal pain, constipation, diarrhea, heartburn, nausea and vomiting. Musculoskeletal: Negative for falls. Neurological: Negative for dizziness and weakness. Psychiatric/Behavioral: Negative for depression. PHYSICAL EXAM:  Visit Vitals  BP (!) 66/0 (BP 1 Location: Right arm, BP Patient Position: Sitting, BP Cuff Size: Adult)   Pulse (!) 50   Temp (!) 96.1 °F (35.6 °C) (Oral)   Resp 20   Ht 6' 1\" (1.854 m)   Wt 177 lb (80.3 kg)   SpO2 100%   BMI 23.35 kg/m²       LVAD INTERROGATION:  Device interrogated in person  Device function normal, normal flow, no events  LVAD   Pump Speed (RPM): 4800  Pump Flow (LPM): 4.1  MAP: 66  PI (Pulsitility Index): 3.9  Power: 3.1  Back Up  at Bedside & Labeled: Yes  Driveline Site Care: Yes  Driveline Dressing: Changed per order  Outpatient: Yes  MAP in Therapeutic Range (Outpatient): No  Testing  Alarms Reviewed: Yes  Back up SC speed: 4800  Back up Low Speed Limit: 4400  Emergency Equipment with Patient?: Yes  Emergency procedures reviewed?: Yes  Drive line site inspected?: Yes  Drive line intergrity inspected?: Yes  Drive line dressing changed?: Yes      Physical Exam  Vitals reviewed. Constitutional:       General: He is not in acute distress.      Appearance: Normal appearance. Cardiovascular:      Rate and Rhythm: Normal rate and regular rhythm. Heart sounds: Normal heart sounds. Comments: LVAD Hum noted on auscultation  Pulmonary:      Effort: Pulmonary effort is normal. No respiratory distress. Abdominal:      General: Bowel sounds are normal. There is no distension. Palpations: Abdomen is soft. Tenderness: There is no abdominal tenderness. Musculoskeletal:      Right lower leg: No edema. Left lower leg: No edema. Skin:     General: Skin is warm and dry. Capillary Refill: Capillary refill takes less than 2 seconds. Neurological:      General: No focal deficit present. Mental Status: He is alert and oriented to person, place, and time. Psychiatric:         Mood and Affect: Mood normal.         Behavior: Behavior normal.         Thought Content: Thought content normal.         Judgment: Judgment normal.            PAST MEDICAL HISTORY:  Past Medical History:   Diagnosis Date    CAD (coronary artery disease) '90 '97, '13 x 2    [place] [facility] Calculus of kidney     Colonic polyps     Congestive heart failure, unspecified     Diabetes ([address]     Gastritis     Hypercholesterolemia     Sleep apnea        PAST SURGICAL HISTORY:  Past Surgical History:   Procedure Laterality Date    COLONOSCOPY  04/06/2011    16, due 21    COLONOSCOPY N/A 3/2/2022    COLONOSCOPY    :- performed by Chelsea Plummer MD at [address] [place] [address] ENDOSCOPY    ENDOSCOPY, COLON, DIAGNOSTIC      11, due 16    HX CORONARY ARTERY BYPASS GRAFT  8/22/12    x 4 vessel by S.  James    HX HEENT      LASIK    HX PACEMAKER PLACEMENT  1/30/13    UT CARDIAC SURG PROCEDURE UNLIST  2012    x 4 vessels       FAMILY HISTORY:  Family History   Problem Relation Age of Onset    Heart Disease Mother         MI    Heart Disease Father         CAD & PVD    Lung Disease Father     Cancer Father         lung    Diabetes Maternal Grandmother     Heart Disease Other CAD    Stroke Sister        SOCIAL HISTORY:  Social History     Socioeconomic History    Marital status:    Tobacco Use    Smoking status: Never Smoker    Smokeless tobacco: Never Used   Vaping Use    Vaping Use: Never used   Substance and Sexual Activity    Alcohol use: Yes     Alcohol/week: 0.0 standard drinks     Comment:  VERY RARE    Drug use: No       LABORATORY RESULTS:     Labs Latest Ref Rng & Units 5/6/2022 5/5/2022 5/4/2022 5/3/2022 5/2/2022 5/1/2022 4/30/2022   WBC 4.1 - 11.1 K/uL 6.0 6.0 6.5 5.3 5.7 5.5 5.6   RBC 4.10 - 5.70 M/uL 3.37(L) 3.19(L) 3.19(L) 3.19(L) 3.08(L) 2.92(L) 2.95(L)   Hemoglobin 12.1 - 17.0 g/dL 8.6(L) 8.2(L) 8.2(L) 8. 1(L) 7. 9(L) 7. 6(L) 7. 6(L)   Hematocrit 36.6 - 50.3 % 27. 6(L) 26. 0(L) 26. 4(L) 25. 7(L) 25. 8(L) 24. 7(L) 24. 6(L)   MCV 80.0 - 99.0 FL 81.9 81.5 82.8 80.6 83.8 84.6 83.4   Platelets 949 - 406 K/uL 358 326 293 315 298 274 275   Lymphocytes 12 - 49 % - - - - - - -   Monocytes 5 - 13 % - - - - - - -   Eosinophils 0 - 7 % - - - - - - -   Basophils 0 - 1 % - - - - - - -   Bands 0 - 6 % - - - - - - -   Albumin 3.5 - 5.0 g/dL 2. 9(L) 2. 9(L) 3.0(L) 2. 8(L) 2. 9(L) 2. 6(L) 2. 8(L)   Calcium 8.5 - 10.1 MG/DL 8.9 8.6 8.4(L) 8.6 8.2(L) 8.0(L) 7. 7(L)   Glucose 65 - 100 mg/dL 111(H) 100 166(H) 102(H) 95 199(H) 125(H)   BUN 6 - 20 MG/DL 25(H) 24(H) 20 21(H) 18 17 24(H)   Creatinine 0.70 - 1.30 MG/DL 0.93 0.97 0.92 0.86 0.86 0.86 1.01   Sodium 136 - 145 mmol/L 136 137 134(L) 136 138 133(L) 137   Potassium 3.5 - 5.1 mmol/L 3.7 3.5 3.8 3.6 3.6 3.7 4.0   TSH 0.36 - 3.74 uIU/mL - - - - - - -   LDH 85 - 241 U/L 172 172 170 175 163 177 158   Some recent data might be hidden     Lab Results   Component Value Date/Time    TSH 2.26 04/10/2022 03:00 AM    TSH 1.68 03/01/2022 11:50 AM    TSH 1.47 05/26/2021 10:44 PM    TSH 1.97 05/20/2021 04:28 PM    TSH 1.41 02/09/2021 03:05 PM    TSH 1.740 08/14/2018 09:58 AM    TSH 1.710 10/18/2017 12:00 AM       ALLERGY:  Allergies   Allergen Reactions    Oxycodone Anaphylaxis    Pcn [Penicillins] Hives        CURRENT MEDICATIONS:    Current Outpatient Medications:     hydrALAZINE (APRESOLINE) 50 mg tablet, Take 0.5 Tablets by mouth three (3) times daily. , Disp: 180 Tablet, Rfl: 2    xygbiakyn-KH-xjgncdbn-guaifen (Mucinex Fast-Max Cold-Flu) 10- mg/20 mL liqd, Take 20 mL by mouth every four (4) hours as needed for Cough. Take 20 mL by mouth every 4 hours as needed for cough, Disp: , Rfl:     sacubitril-valsartan (ENTRESTO) 49 mg/51 mg tablet, Take 0.5 Tablets by mouth every twelve (12) hours. (Patient taking differently: Take 1 Tablet by mouth daily.), Disp: 60 Tablet, Rfl: 2    bumetanide (BUMEX) 2 mg tablet, Take 0.5 Tablets by mouth daily. (Patient taking differently: Take 1 Tablet by mouth daily. Take 1 tablet by mouth daily), Disp: 90 Tablet, Rfl: 0    tamsulosin (FLOMAX) 0.4 mg capsule, Take 1 Capsule by mouth daily. , Disp: 30 Capsule, Rfl: 2    eplerenone (INSPRA) 50 mg tab tablet, Take 0.5 Tablets by mouth daily. (Patient taking differently: Take 25 mg by mouth daily. take 1/2  tab po daily), Disp: 90 Tablet, Rfl: 1    docusate sodium (COLACE) 100 mg capsule, Take 1 Capsule by mouth daily for 90 days. , Disp: 30 Capsule, Rfl: 2    famotidine (PEPCID) 20 mg tablet, Take 1 Tablet by mouth every twelve (12) hours. , Disp: 60 Tablet, Rfl: 2    magnesium oxide (MAG-OX) 400 mg tablet, Take 1 Tablet by mouth daily. , Disp: 30 Tablet, Rfl: 2    mirtazapine (REMERON) 7.5 mg tablet, Take 1 Tablet by mouth nightly., Disp: 30 Tablet, Rfl: 2    sucralfate (CARAFATE) 1 gram tablet, Take 1 Tablet by mouth Before breakfast, lunch, dinner and at bedtime. , Disp: 120 Tablet, Rfl: 2    warfarin (COUMADIN) 2.5 mg tablet, Take 2 Tablets by mouth daily. , Disp: 90 Tablet, Rfl: 2    potassium chloride SR (K-TAB) 20 mEq tablet, Take 2 Tablets by mouth two (2) times a day., Disp: 360 Tablet, Rfl: 1    metFORMIN (GLUCOPHAGE) 500 mg tablet, Take 1 Tablet by mouth two (2) times daily (with meals). , Disp: 180 Tablet, Rfl: 3    acetaminophen (TYLENOL EXTRA STRENGTH) 500 mg tablet, Take 500 mg by mouth every six (6) hours as needed for Fever or Pain., Disp: , Rfl:     aspirin delayed-release 81 mg tablet, Take 81 mg by mouth daily. , Disp: , Rfl:     PATIENT CARE TEAM:  Patient Care Team:  Maren Chavez MD as PCP - General (Internal Medicine Physician)  Maren Chavez MD as PCP - [facility]  Armida Donald MD as Physician (Cardiovascular Disease Physician)  Lexx Serna MD as Physician (Gastroenterology)  Marcelo Drake MD as Physician (Orthopedic Surgery)  Kusum Quevedo MD as Physician (Ophthalmology)  Arturo Woo MD (Cardiovascular Disease Physician)  Nino Montague MD (Cardiovascular Disease Physician)     Thank you for allowing us to participate in this patient's care.     Cheyenne Charlton NP  [address] [place] [address], [address]  Phone: [phone]

## 2022-06-03 ENCOUNTER — TELEPHONE ANTICOAG (OUTPATIENT)
Dept: CARDIOLOGY CLINIC | Age: 77
End: 2022-06-03

## 2022-06-03 ENCOUNTER — HOME CARE VISIT (OUTPATIENT)
Dept: SCHEDULING | Facility: HOME HEALTH | Age: 77
End: 2022-06-03
Payer: MEDICARE

## 2022-06-03 LAB — INR, EXTERNAL: 2

## 2022-06-03 PROCEDURE — G0299 HHS/HOSPICE OF RN EA 15 MIN: HCPCS

## 2022-06-06 ENCOUNTER — TELEPHONE ANTICOAG (OUTPATIENT)
Dept: CARDIOLOGY CLINIC | Age: 77
End: 2022-06-06

## 2022-06-06 ENCOUNTER — HOME CARE VISIT (OUTPATIENT)
Dept: SCHEDULING | Facility: HOME HEALTH | Age: 77
End: 2022-06-06
Payer: MEDICARE

## 2022-06-06 VITALS — TEMPERATURE: 97.2 F | RESPIRATION RATE: 20 BRPM | OXYGEN SATURATION: 98 %

## 2022-06-06 LAB — INR, EXTERNAL: 1.9

## 2022-06-06 PROCEDURE — G0299 HHS/HOSPICE OF RN EA 15 MIN: HCPCS

## 2022-06-06 PROCEDURE — 400013 HH SOC

## 2022-06-07 ENCOUNTER — TELEPHONE (OUTPATIENT)
Dept: CARDIAC REHAB | Age: 77
End: 2022-06-07

## 2022-06-07 ENCOUNTER — TELEPHONE (OUTPATIENT)
Dept: CARDIOLOGY CLINIC | Age: 77
End: 2022-06-07

## 2022-06-07 VITALS
BODY MASS INDEX: 22.96 KG/M2 | RESPIRATION RATE: 20 BRPM | OXYGEN SATURATION: 98 % | WEIGHT: 174 LBS | TEMPERATURE: 98.2 F

## 2022-06-07 NOTE — TELEPHONE ENCOUNTER
Suspect BRAND is d/t effusion. He can increase Bumex to 2 mg (1 tab) BID until appointment on Thursday. ER if symptoms worsen.

## 2022-06-07 NOTE — TELEPHONE ENCOUNTER
32 [phone] 16 - Returned call to patient's wife. Patient reports a \"lack of energy\" and reports BRAND. Patient denies edema and weight gain. Patient is scheduled for a [facility] clinic visit on 6/16/22 and a thoracentesis on 6/10/22.    6422 - called and spoke with patient's wife. John Dunham verbalized understanding to increase Bumex to 2 mg twice daily until Thursday.

## 2022-06-07 NOTE — TELEPHONE ENCOUNTER
I spoke to [address] [place] [address] over the phone today, 6/7/22. He had an LVAD implant on 4/5/22. [facility] d[facility]t [facility]/HH still coming out to see him two times weekly. Will follow up the beginning of July to make an appointment for him to return.   Wilda Campos, RN, BSN

## 2022-06-08 ENCOUNTER — HOME CARE VISIT (OUTPATIENT)
Dept: SCHEDULING | Facility: HOME HEALTH | Age: 77
End: 2022-06-08
Payer: MEDICARE

## 2022-06-08 ENCOUNTER — TELEPHONE ANTICOAG (OUTPATIENT)
Dept: CARDIOLOGY CLINIC | Age: 77
End: 2022-06-08

## 2022-06-08 ENCOUNTER — TELEPHONE (OUTPATIENT)
Dept: CARDIOLOGY CLINIC | Age: 77
End: 2022-06-08

## 2022-06-08 LAB — INR, EXTERNAL: 1.9

## 2022-06-08 PROCEDURE — G0299 HHS/HOSPICE OF RN EA 15 MIN: HCPCS

## 2022-06-08 NOTE — TELEPHONE ENCOUNTER
Received a vm from patient's wife stating she received a call at 6:30 PM last evening changing patient's thoracentesis to Thursday 6/9/22 (from Friday 6/10/22). Called and spoke with BenyOmer Mccloud who stated patient's appointment who confirmed patient's appointment was moved. Called and spoke with patient's wife. Confirmed change to Thoracentesis appointment and requested patient check his INR today. Patricia Patiño verbalized agreement.

## 2022-06-09 ENCOUNTER — HOSPITAL ENCOUNTER (OUTPATIENT)
Dept: GENERAL RADIOLOGY | Age: 77
Discharge: HOME | End: 2022-06-09
Attending: PHYSICIAN ASSISTANT
Payer: MEDICARE

## 2022-06-09 ENCOUNTER — TELEPHONE ANTICOAG (OUTPATIENT)
Dept: CARDIOLOGY CLINIC | Age: 77
End: 2022-06-09

## 2022-06-09 ENCOUNTER — HOME CARE VISIT (OUTPATIENT)
Dept: SCHEDULING | Facility: HOME HEALTH | Age: 77
End: 2022-06-09
Payer: MEDICARE

## 2022-06-09 ENCOUNTER — DOCUMENTATION ONLY (OUTPATIENT)
Dept: CARDIOLOGY CLINIC | Age: 77
End: 2022-06-09

## 2022-06-09 ENCOUNTER — HOSPITAL ENCOUNTER (OUTPATIENT)
Dept: ULTRASOUND IMAGING | Age: 77
Discharge: HOME | End: 2022-06-09
Attending: NURSE PRACTITIONER
Payer: MEDICARE

## 2022-06-09 VITALS — BODY MASS INDEX: 22.66 KG/M2 | WEIGHT: 171 LBS | HEART RATE: 86 BPM | HEIGHT: 73 IN | RESPIRATION RATE: 25 BRPM

## 2022-06-09 VITALS
BODY MASS INDEX: 22.96 KG/M2 | OXYGEN SATURATION: 98 % | RESPIRATION RATE: 20 BRPM | TEMPERATURE: 98.2 F | WEIGHT: 174 LBS

## 2022-06-09 DIAGNOSIS — J90 PLEURAL EFFUSION: ICD-10-CM

## 2022-06-09 DIAGNOSIS — I50.9 CHRONIC HEART FAILURE, UNSPECIFIED HEART FAILURE TYPE (HCC): ICD-10-CM

## 2022-06-09 LAB
COMMENT, HOLDF: NORMAL
INR, EXTERNAL: 1.9
SAMPLES BEING HELD,HOLD: NORMAL

## 2022-06-09 PROCEDURE — 2709999900 HC NON-CHARGEABLE SUPPLY

## 2022-06-09 PROCEDURE — 71045 X-RAY EXAM CHEST 1 VIEW: CPT

## 2022-06-09 PROCEDURE — 77030032034 HC SYS EVAC ASEPT DISP BBMI -B

## 2022-06-09 PROCEDURE — 32555 ASPIRATE PLEURA W/ IMAGING: CPT

## 2022-06-09 PROCEDURE — G0299 HHS/HOSPICE OF RN EA 15 MIN: HCPCS

## 2022-06-09 PROCEDURE — 74011000250 HC RX REV CODE- 250: Performed by: PHYSICIAN ASSISTANT

## 2022-06-09 RX ORDER — SODIUM BICARBONATE 42 MG/ML
1 INJECTION, SOLUTION INTRAVENOUS
Status: COMPLETED | OUTPATIENT
Start: 2022-06-09 | End: 2022-06-09

## 2022-06-09 RX ORDER — SODIUM BICARBONATE 42 MG/ML
2 INJECTION, SOLUTION INTRAVENOUS
Status: DISCONTINUED | OUTPATIENT
Start: 2022-06-09 | End: 2022-06-10 | Stop reason: HOSPADM

## 2022-06-09 RX ORDER — LIDOCAINE HYDROCHLORIDE 10 MG/ML
5 INJECTION, SOLUTION EPIDURAL; INFILTRATION; INTRACAUDAL; PERINEURAL
Status: DISCONTINUED | OUTPATIENT
Start: 2022-06-09 | End: 2022-06-10 | Stop reason: HOSPADM

## 2022-06-09 RX ORDER — LIDOCAINE HYDROCHLORIDE 10 MG/ML
5 INJECTION, SOLUTION EPIDURAL; INFILTRATION; INTRACAUDAL; PERINEURAL
Status: COMPLETED | OUTPATIENT
Start: 2022-06-09 | End: 2022-06-09

## 2022-06-09 RX ADMIN — LIDOCAINE HYDROCHLORIDE 6 ML: 10 INJECTION, SOLUTION EPIDURAL; INFILTRATION; INTRACAUDAL; PERINEURAL at 13:53

## 2022-06-09 RX ADMIN — SODIUM BICARBONATE 1 ML: 42 INJECTION, SOLUTION INTRAVENOUS at 13:53

## 2022-06-09 NOTE — ROUTINE PROCESS
Pt with uneventful left thoracentesis. Pt has LVAD and LVAD nurse Lindsay Ibarra present to obtain MAP measurements. O2 saturations not readable and YOLI Ramsay aware. Pt states he feels well. 1400cc fluid removed from left lung. Pt stable. Verbalizes understanding of discharge instructions. Ready for discharge.

## 2022-06-09 NOTE — ROUTINE PROCESS
Pt arrives ambulatory to angio department accompanied by family for left thoracentesis procedure. All assessments completed and consent was reviewed. Education given was regarding procedure, post-procedure care and  management/follow-up. Opportunity for questions was provided and all questions and concerns were addressed.

## 2022-06-09 NOTE — DISCHARGE INSTRUCTIONS
Patient Education        Thoracentesis: What to Expect at Home  Your Recovery  Thoracentesis (say \"vvii-lv-vmv-CHEL-sis\") is a procedure to remove fluid from the space between the lungs and the chest wall (pleural space). This procedure may also be called a \"chest tap. \" It's normal to have a small amount of fluid in the pleural space. But too much fluid can build up because of problems such as infection, heart failure, or lung cancer. The procedure may have been done to help with shortness of breath and pain caused by the fluid buildup. Or you may have had this procedure so the doctor could test the fluid to find the cause of the buildup. Your chest may be sore where the doctor put the needle or catheter into your skin (the puncture site). This usually gets better after a day or two. You can go back to work or your normal activities as soon as you feel up to it. If a large amount of pleural fluid was removed during the procedure, you will probably be able to breathe more easily. If more pleural fluid collects and needs to be removed, another thoracentesis may be done later. If the doctor sent the fluid to a lab for testing, it may take several days to get the results. The doctor or nurse will discuss the results with you. This care sheet gives you a general idea about how long it will take for you to recover. But each person recovers at a different pace. Follow the steps below to feel better as quickly as possible. How can you care for yourself at home? Activity    · Rest when you feel tired. Getting enough sleep will help you recover.     · Avoid strenuous activities, such as bicycle riding, jogging, weight lifting, or aerobic exercise, until your doctor says it is okay.     · You may shower. Do not take a bath until the puncture site has healed, or until your doctor tells you it is okay.     · Ask your doctor when you can drive again.     · You may need to take 1 or 2 days off from work.  It depends on the type of work you do and how you feel. Diet    · You can eat your normal diet.     · Drink plenty of fluids (unless your doctor tells you not to). Medicines    · Your doctor will tell you if and when you can restart your medicines. He or she will also give you instructions about taking any new medicines.     · If you take aspirin or some other blood thinner, ask your doctor if and when to start taking it again. Make sure that you understand exactly what your doctor wants you to do.     · Be safe with medicines. Take pain medicines exactly as directed. ? If the doctor gave you a prescription medicine for pain, take it as prescribed. ? If you are not taking a prescription pain medicine, ask your doctor if you can take an over-the-counter medicine. ? Do not take two or more pain medicines at the same time unless the doctor told you to. Many pain medicines have acetaminophen, which is Tylenol. Too much acetaminophen (Tylenol) can be harmful.     · If you think your pain medicine is making you sick to your stomach:  ? Take your medicine after meals (unless your doctor has told you not to). ? Ask your doctor for a different pain medicine.     · If your doctor prescribed antibiotics, take them as directed. Do not stop taking them just because you feel better. You need to take the full course of antibiotics. Care of the puncture site    · Wash the area daily with warm, soapy water, and pat it dry. Don't use hydrogen peroxide or alcohol, which may delay healing. You may cover the area with a gauze bandage if it weeps or rubs against clothing. Change the bandage every day.     · Keep the area clean and dry. Follow-up care is a key part of your treatment and safety. Be sure to make and go to all appointments, and call your doctor if you are having problems. It's also a good idea to know your test results and keep a list of the medicines you take. When should you call for help?    Call 911 anytime you think you may need emergency care. For example, call if:    · You passed out (lost consciousness).     · You have severe trouble breathing.     · You have sudden chest pain and shortness of breath, or you cough up blood. Call your doctor now or seek immediate medical care if:    · You have shortness of breath that is new or getting worse.     · You have new or worse pain in your chest, especially when you take a deep breath.     · You are sick to your stomach or cannot keep fluids down.     · You have a fever over 100°F.     · Bright red blood has soaked through the bandage over your puncture site.     · You have signs of infection, such as:  ? Increased pain, swelling, warmth, or redness. ? Red streaks leading from the puncture site. ? Pus draining from the puncture site. ? Swollen lymph nodes in your neck, armpits, or groin. ? A fever.     · You cough up a lot more mucus than normal, or your mucus changes color. Watch closely for changes in your health, and be sure to contact your doctor if you have any problems. Where can you learn more? Go to http://www.gray.com/  Enter Q755 in the search box to learn more about \"Thoracentesis: What to Expect at Home. \"  Current as of: July 6, 2021               Content Version: 13.2  © 2006-2022 [employer]. Care instructions adapted under license by [employer] (which disclaims liability or warranty for this information). If you have questions about a medical condition or this instruction, always ask your healthcare professional. [place] [address] any warranty or liability for your use of this information.

## 2022-06-09 NOTE — PROGRESS NOTES
[address] [address]        Procedure: Thoracentesis     Pre Procedure: 9508  Speed: 4800  Flow:3.7  MAP: 78  PI:6.4  Power:3.2         Post Procedure: 9015  Speed:4800  Flow:3.6  MAP:76  PI:6.3  Power:3.2     VAD Coordinator managed LVAD equipment during procedure.     Hallie Ogden RN  VAD Coordinator

## 2022-06-10 VITALS
RESPIRATION RATE: 20 BRPM | TEMPERATURE: 98.2 F | WEIGHT: 171 LBS | OXYGEN SATURATION: 98 % | BODY MASS INDEX: 22.56 KG/M2

## 2022-06-13 ENCOUNTER — HOME CARE VISIT (OUTPATIENT)
Dept: SCHEDULING | Facility: HOME HEALTH | Age: 77
End: 2022-06-13
Payer: MEDICARE

## 2022-06-13 ENCOUNTER — TELEPHONE ANTICOAG (OUTPATIENT)
Dept: CARDIOLOGY CLINIC | Age: 77
End: 2022-06-13

## 2022-06-13 LAB — INR, EXTERNAL: 1.6

## 2022-06-13 PROCEDURE — G0299 HHS/HOSPICE OF RN EA 15 MIN: HCPCS

## 2022-06-14 VITALS
TEMPERATURE: 97.2 F | BODY MASS INDEX: 22.43 KG/M2 | OXYGEN SATURATION: 98 % | WEIGHT: 170 LBS | RESPIRATION RATE: 20 BRPM

## 2022-06-15 ENCOUNTER — TELEPHONE (OUTPATIENT)
Dept: CARDIOLOGY CLINIC | Age: 77
End: 2022-06-15

## 2022-06-15 NOTE — PATIENT INSTRUCTIONS
Medication changes:  Change Hydralazine to 10 mg by mouth three times daily. Please take this to your pharmacy to notify them of the change in medications. Testing Ordered: An order for Echo has been placed to be done in 2 weeks. You will be receiving an automated call from Coordination of Care to schedule this test. If you are unavailable to receive the call or would like to contact coordination of care yourself you may contact [phone] to schedule. You will need to contact coordination of care yourself if you miss their calls as they will only make 3 attempts to reach you. An order for a chest Xray has been ordered. This is a walk in test. Please present to the outpatient registration at [facility] in 2 weeks for this test.        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 Your urine should be clear and light yellow straw colored. Follow up in 2 weeks with Rudolph Mccloud      For further patient and caregiver resources as well as access to online LVAD support groups visit [email or web address]       Please bring your daily sheets and medications to your next appointment. Please monitor your weights daily upon waking and after using the bathroom. Keep a written records of your weights and bring to your next appointment. If you have a weight gain of 3 or more pounds overnight OR 5 or more pounds in one week please contact our office. Thank you for allowing us the privilege of being a part of your healthcare team! Please do not hesitate to contact our office at [phone] with any questions or concerns.        Virtual Heart Failure [address] invites you to learn more about heart failure and to share your questions, ideas, and experiences with others. Each month, the Heart Failure Support Group features a new educational topic and a guest speaker, followed by an interactive discussion. Our Heart Failure Nurse Navigator will moderate each session. You will be able to participate by phone, tablet or computer through [employer]. This support group takes place on the 3rd Thursday of each month from 6:00-7:30PM. All individuals living with heart failure and their caregivers are welcome to join. If you are interested in participating, please contact us at [email or web address] and you will be sent the link to join the [employer].

## 2022-06-15 NOTE — TELEPHONE ENCOUNTER
Telephone Call RE:  Appointment reminder     Outcome:     [x] Patient confirmed appointment   [] Patient rescheduled appointment for    [] Unable to reach  [] Left message             [] Other:     ---------------------             [] Screened for [facility]

## 2022-06-16 ENCOUNTER — TELEPHONE (OUTPATIENT)
Dept: CARDIOLOGY CLINIC | Age: 77
End: 2022-06-16

## 2022-06-16 ENCOUNTER — TELEPHONE ANTICOAG (OUTPATIENT)
Dept: CARDIOLOGY CLINIC | Age: 77
End: 2022-06-16

## 2022-06-16 ENCOUNTER — OFFICE VISIT (OUTPATIENT)
Dept: CARDIOLOGY CLINIC | Age: 77
End: 2022-06-16
Payer: MEDICARE

## 2022-06-16 VITALS
TEMPERATURE: 97.5 F | HEIGHT: 73 IN | RESPIRATION RATE: 16 BRPM | OXYGEN SATURATION: 100 % | HEART RATE: 71 BPM | SYSTOLIC BLOOD PRESSURE: 68 MMHG | WEIGHT: 173 LBS | BODY MASS INDEX: 22.93 KG/M2

## 2022-06-16 DIAGNOSIS — J90 PLEURAL EFFUSION: ICD-10-CM

## 2022-06-16 DIAGNOSIS — I50.22 CHRONIC SYSTOLIC HEART FAILURE (HCC): ICD-10-CM

## 2022-06-16 DIAGNOSIS — Z79.01 CHRONIC ANTICOAGULATION: ICD-10-CM

## 2022-06-16 DIAGNOSIS — Z95.811 LVAD (LEFT VENTRICULAR ASSIST DEVICE) PRESENT (HCC): Primary | ICD-10-CM

## 2022-06-16 LAB
INR BLD: 2.4
PT POC: NORMAL
VALID INTERNAL CONTROL?: YES

## 2022-06-16 PROCEDURE — G8427 DOCREV CUR MEDS BY ELIG CLIN: HCPCS | Performed by: NURSE PRACTITIONER

## 2022-06-16 PROCEDURE — G8432 DEP SCR NOT DOC, RNG: HCPCS | Performed by: NURSE PRACTITIONER

## 2022-06-16 PROCEDURE — 1123F ACP DISCUSS/DSCN MKR DOCD: CPT | Performed by: NURSE PRACTITIONER

## 2022-06-16 PROCEDURE — G8420 CALC BMI NORM PARAMETERS: HCPCS | Performed by: NURSE PRACTITIONER

## 2022-06-16 PROCEDURE — 1101F PT FALLS ASSESS-DOCD LE1/YR: CPT | Performed by: NURSE PRACTITIONER

## 2022-06-16 PROCEDURE — G8536 NO DOC ELDER MAL SCRN: HCPCS | Performed by: NURSE PRACTITIONER

## 2022-06-16 PROCEDURE — 85610 PROTHROMBIN TIME: CPT | Performed by: NURSE PRACTITIONER

## 2022-06-16 PROCEDURE — 93750 INTERROGATION VAD IN PERSON: CPT | Performed by: NURSE PRACTITIONER

## 2022-06-16 PROCEDURE — 99214 OFFICE O/P EST MOD 30 MIN: CPT | Performed by: NURSE PRACTITIONER

## 2022-06-16 RX ORDER — BUMETANIDE 2 MG/1
2 TABLET ORAL DAILY
Qty: 90 TABLET | Refills: 1
Start: 2022-06-16 | End: 2022-09-27 | Stop reason: DRUGHIGH

## 2022-06-16 RX ORDER — HYDRALAZINE HYDROCHLORIDE 10 MG/1
10 TABLET, FILM COATED ORAL 3 TIMES DAILY
Qty: 90 TABLET | Refills: 0 | Status: SHIPPED | OUTPATIENT
Start: 2022-06-16 | End: 2022-07-15 | Stop reason: SDUPTHER

## 2022-06-16 RX ORDER — EPLERENONE 25 MG/1
25 TABLET, FILM COATED ORAL DAILY
Qty: 90 TABLET | Refills: 1
Start: 2022-06-16 | End: 2022-10-28

## 2022-06-16 RX ORDER — SACUBITRIL AND VALSARTAN 24; 26 MG/1; MG/1
1 TABLET, FILM COATED ORAL 2 TIMES DAILY
Qty: 60 TABLET | Refills: 1 | Status: SHIPPED | OUTPATIENT
Start: 2022-06-16 | End: 2022-08-12 | Stop reason: SDUPTHER

## 2022-06-16 NOTE — TELEPHONE ENCOUNTER
Received request from Anika Newsome RN to schedule follow up appointment for patient on Thursday, 6/30/22 at 2:00 p.m. Contacted patient, unable to reach left voicemail message with call back information.

## 2022-06-16 NOTE — TELEPHONE ENCOUNTER
Called patient to schedule follow-up. Appt offered was 9:00 am on 6/30 with Yuko Bass, but patient will not be able to make that time. Patient was informed we'd call back with another date/time.

## 2022-06-16 NOTE — PROGRESS NOTES
Selina Truong is a 69 y.o. male with a history of ICM with Heartmate 3 implant date of 4/5/2022. Patient was seen in clinic for routine follow up with Leonie Hashimoto NP. Drive line dressing change completed per policy. No signs or symptoms of infection noted. Driveline exit site is not yet healed. Reviewed steps in sterile dressing change with patient and patient's caregiver Pamela Raymond). Reviewed medication change and AVS. Patient had no further questions. Ana Rosa Camacho RN  VAD Coordinator.

## 2022-06-16 NOTE — PROGRESS NOTES
[facility] in [facility],  [address] Visit      Patient name: Marilu Streeter  Patient : 1945  Patient MRN: [id number]  Date of service: 22    Chief Complaint   Patient presents with    Follow-up     LVAD       PLAN OF CARE:  · 69 y.o. male with severe ischemic cardiomyopathy, LVEF 15-20% s/p HM3 LVAD implant as DT on 22 by Dr. Domonique Tamayo c/b intraoperative bleeding requiring multiple blood products and subsequent RV failure with severe TR requiring prolonged dobutamine wean and long term CT support   · Will need surveillance monthly echos to monitor TR, chest xrays to monitor pleural effusion, and aggressive PT/OT and nutritional support with weekly prealbumins and iron profiles  · S/p left thoracentesis 22 by IR; 1.4L removed    RECOMMENDATIONS:  Continue speed 4800rpms, low speed 4400  No BB due to RV failure   Continue Entresto 24/26mg BID; pt currently taking half tab of 49/51mg BID, new rx sent for 24/26mg tabs  Cont Eplerenone 25mg daily  Bumex 2 mg PO daily     Continue Farxiga 5mg daily  Continue hydralazine- new rx sent for 10mg tabs; to take 10mg TID  Continue warfarin, goal INR 2-3  ASA 81mg daily  Continue Flomax   Monitor prealbumin and iron profile with  labs   Driveline dressing changes 3 times a week  CPAP qHS   Advanced care plan forms on file   Strict I/O, daily weights, Na+ restricted diet   Continue VAD education   Continue HH SN, completed PT and OT  CXR in 2 weeks to f/u after thoracentesis  Echo in 2-3 weeks  Follow up in clinic in 2 weeks       IMPRESSION:  S/p LVAD implant as DT  · Post-op RV failure requiring slow wean of dobutamine  · Persistent large volume chest tube output  Chronic systolic heart failure  · Stage D, NYHA class IV symptoms  · Non-ischemic cardiomyopathy, LVEF 20% with LVEDD 6.2  · RV dysfunction  At risk of sudden cardiac death  · Recent cardiac arrest   · S/p ICD [employer] followed by [facility]); New implant on 10/21/2021 [employer]  Coronary artery disease  · S/p multiple interventions  · S/p 4V CABG (8/2012)  · LHC (7/2019) severe stenosis of LIMA to LAD anastomosis site, SVG graft to OM is occluded, SVG graft to RCA 40-50%, LAD occluded proximally, severe proximal LCx, RCA is the long . · PET (6/2019) EF 24% with anterior lateral and inferior reversible defect  Cardiac risk factors  · HTN  · HL  · DM2  · SRUTHI on CPAP  · MIld carotid stenosis  Anemia, microcytic  · Iron deficiency  DVT with filter  Pulmonary nodules   Dysphagia       LIFE GOALS:  Patient goals reviewed and discussed with patient       CARDIAC IMAGING AND DIAGNOSTICS REVIEWED:  Echo 6/2/22 LVEF 20-25%, mild MR, mod to severe TR, LVIDd 4.9cm, RVIDd 6cm, TAPSE 0.9cm   Echo 5/4/22 LVEF 15-20%, mod TR, LVIDd 5.3 cm, TAPSE 0.9 cm   Echo 4/26/22 - LVIDd 4.9 cm, TAPSE 0.5 cm,   Echo 4/18/22- LVEF 15-20%, severe TR, TAPSE 0.5cm, RVIDd 6cm   Echo (4/8/22)    Left Ventricle: Left ventricle size is normal. Moderately increased wall thickness. Findings consistent with concentric remodeling. Severe global hypokinesis present. Severely reduced left ventricular systolic function with a visually estimated EF of 10 -15%. 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   Aortic Valve: Moderate sclerosis of the aortic valve cusp.   Mitral Valve: Moderately thickened leaflet. Mild transvalvular regurgitation.   Tricuspid Valve: Severe transvalvular regurgitation.   Right Atrium: Right atrium is severely dilated. Echo (3/2/22)   · LV 10-15%  · Mod-severe MR      Echo (11/23/21):  · LV 15-20%. · Mod-severe, MR, mod-TR     Echo (5/20/21)  · LV: Estimated LVEF is 20 - 25%. Normal wall thickness. Mildly dilated left ventricle. Severely and globally reduced systolic function. Severe (grade 4) left ventricular diastolic dysfunction.   · LA: Severely dilated left atrium. · RA: Severely dilated right atrium. · MV: Moderate mitral valve regurgitation is present. · TV: Moderate tricuspid valve regurgitation is present. · AO: Mild aortic root dilatation. · RV: Pacer/ICD present. · LVED 6.2cm     EKG (5/20/21) SB with 1degree AV block, QRS 116ms     LHC (5/24/21) Native Coronaries: LM - moderate to severe distal disease 50%. % prox occluded (), heavily calcified, LCx: 80% proximal stenosis. OM1 is  (seen filling faintly via collaterals). OM2 99% stenosis. RCA: 100% proximal occluded. LIMA to LAD: patent, supplies only a diagonal branch (probably D2). The LAD distal to the bifurcation is 100% occluded () and fills via right to left collaterals off the SVG to RCA. SVG to R-PDA: patent with moderate diffuse irregularities but no obstructive disease in the graft. No appealing interventional targets. 48 Marlen Reilly Report 5/4/2022 5/2/2022 11/12/2021 7/6/2021 5/20/2021   (PRE) HR 82 82 88 98 74   (PRE) O2 Sat - 98 100 - 99   (POST) HR 91 88 102 - 81   (POST) O2 Sat - 98 99 98% on Ra 99   Distance in Meters 191.41 181.36 386. 58 - 1158.24     CXR Results  (Last 48 hours)               06/02/22 1108  XR CHEST PA LAT Final result    Impression:      Increased left-sided pleural effusion and atelectasis. Narrative:  Clinical history: Hx of effusion, s/p LVAD   INDICATION:   Hx of effusion, s/p LVAD   COMPARISON: 5/10/2020       FINDINGS:    PA and lateral views of the chest are obtained. The cardiopericardial silhouette is stable in appearance status post anatomy. Cardiac pacer and left ventricular assist device redemonstrated. Increased   left-sided pleural effusion and atelectasis.                    BRIEF HISTORY OF PRESENT ILLNESS:  Cholo Rivera is a 69 y.o. male with h/o HTN, HL, DM2, SRUTHI on CPAP, chronic systolic heart failure, stage D, NYHA class IV symptoms, non-ischemic cardiomyopathy, LVEF 20% with LVEDD 6.2, RV dysfunciton, TAPSE 1.22, TBili 1.5 likely from cardiac congestion, recent cardiac arrest s/p ICD (1/2013, [employer] followed by [facility]), coronary artery disease s/p multiple interventions s/p 4V CABG (8/2012), LHC (7/2019) severe stenosis of LIMA to LAD anastomosis site, SVG graft to OM is occluded, SVG graft to RCA 40-50%, LAD occluded proximally, severe proximal LCx, RCA is the long . PET (6/2019) EF 24% with anterior lateral and inferior reversible defect. Anemia, microcytic with iron deficiency, DVT with filter. Patient was referred to [facility] by Dr. Angela Clancy in 2021 for evaluation of his candidacy for advanced therapies. Patient agreed to inpatient initiation of palliative infusion of chronic inotropes and evaluation for LVAD-DT. Patient was admitted 5/2-5/25/21. Patient was started on milrinone infusion and had first part of LVAD evaluation completed. Right and left heart cath on 5/24/21 showed severe diffuse native vessel CAD, with patent grafts (LIMA to D2, SVG to RCA). Pt was found to have pulmonary nodule during workup. Antiplatelet therapy was held during hospitalization and after discharge due to anticipated pulmonary nodule biopsy, bone marrow biopsy and EGD/C-Scope as part of LVAD workup. Pt found to have adenocarcinoma of the lung, and so LVAD was deferred pending treatment. Patient completed radiation treatment for adenocarcinoma of the lung. Hem-onc cleared patient for VAD implant with 90% 2 year prognosis. He presented to [facility] on 4/3 for admission for planned LVAD implant which he underwent on 4/5/22. Was re-do sternotomy, bleeding intra-op, required cryo, k-centra, FFP, Plt, and cellsaver in OR. Had RV dysfunction noted intra-op; requiring lower VAD speed and dual inotrope support. 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 He also had issues with significant CT drainage requiring prolonged placement.   He was eventually discharged home on POD 29 with GDMT (no BB due to RV failure) with HH SN/PT and OT. He had persistent left pleural effusion and underwent thoracentesis in IR on 6/9/22 with 1.4L removed. INTERVAL HISTORY:   Pt presents for LVAD follow up today. He reports feeling better since having the thoracentesis. He has been walking more and feels good with walking short distances. No issues with dizziness or lightheadedness. Reports appetite is good, normal BMs. No pain, tenderness or erythema at the driveline exit site. REVIEW OF SYSTEMS:  Review of Systems   Constitutional: Negative for chills, fever, malaise/fatigue and weight loss. Respiratory: Negative for cough and shortness of breath. Cardiovascular: Negative for chest pain, palpitations, orthopnea and leg swelling. Gastrointestinal: Negative for abdominal pain, constipation, diarrhea, heartburn, nausea and vomiting. Musculoskeletal: Negative for falls. Neurological: Negative for dizziness and weakness. Psychiatric/Behavioral: Negative for depression. PHYSICAL EXAM:  Visit Vitals  BP (!) 68/0 (BP 1 Location: Right arm, BP Patient Position: Sitting, BP Cuff Size: Adult)   Pulse 71   Temp 97.5 °F (36.4 °C) (Oral)   Resp 16   Ht 6' 1\" (1.854 m)   Wt 173 lb (78.5 kg)   SpO2 100%   BMI 22.82 kg/m²       LVAD INTERROGATION:  Device interrogated in person  Device function normal, normal flow, no events  LVAD   Pump Speed (RPM): 4800  Pump Flow (LPM): 4.4  MAP: 68  PI (Pulsitility Index): 4.2  Power: 3.2  Back Up  at Bedside & Labeled: Yes  Driveline Site Care: Yes  Driveline Dressing: Changed per order  Outpatient: Yes  MAP in Therapeutic Range (Outpatient): No  Testing  Alarms Reviewed: Yes  Back up SC speed: 4800  Back up Low Speed Limit: 4400  Emergency Equipment with Patient?: Yes  Emergency procedures reviewed?: Yes  Drive line site inspected?: Yes  Drive line intergrity inspected?: Yes  Drive line dressing changed?: Yes      Physical Exam  Vitals reviewed. Constitutional:       General: He is not in acute distress. Appearance: Normal appearance. Cardiovascular:      Rate and Rhythm: Normal rate and regular rhythm. Heart sounds: Normal heart sounds. Comments: LVAD Hum noted on auscultation  Pulmonary:      Effort: Pulmonary effort is normal. No respiratory distress. Abdominal:      General: Bowel sounds are normal. There is no distension. Palpations: Abdomen is soft. Tenderness: There is no abdominal tenderness. Musculoskeletal:      Right lower leg: No edema. Left lower leg: No edema. Skin:     General: Skin is warm and dry. Capillary Refill: Capillary refill takes less than 2 seconds. Neurological:      General: No focal deficit present. Mental Status: He is alert and oriented to person, place, and time. Psychiatric:         Mood and Affect: Mood normal.         Behavior: Behavior normal.         Thought Content: Thought content normal.         Judgment: Judgment normal.            PAST MEDICAL HISTORY:  Past Medical History:   Diagnosis Date    CAD (coronary artery disease) '90 '97, '13 x 2    [place] [facility] [place] Calculus of kidney     Colonic polyps     Congestive heart failure, unspecified     Diabetes ([address]     Gastritis     Hypercholesterolemia     Sleep apnea        PAST SURGICAL HISTORY:  Past Surgical History:   Procedure Laterality Date    COLONOSCOPY  04/06/2011    16, due 21    COLONOSCOPY N/A 3/2/2022    COLONOSCOPY    :- [facility] at [facility] ENDOSCOPY    ENDOSCOPY, COLON, DIAGNOSTIC      11, due 16    HX CORONARY ARTERY BYPASS GRAFT  8/22/12    x 4 vessel by S.  James    HX HEENT      LASIK    HX PACEMAKER PLACEMENT  1/30/13    MA CARDIAC SURG PROCEDURE UNLIST  2012    x 4 vessels       FAMILY HISTORY:  Family History   Problem Relation Age of Onset    Heart Disease Mother         MI    Heart Disease Father         CAD & PVD    Lung Disease Father    Nikkipam Edouard Cancer Father         lung    Diabetes Maternal Grandmother     Heart Disease Other         CAD    Stroke Sister        SOCIAL HISTORY:  Social History     Socioeconomic History    Marital status:    Tobacco Use    Smoking status: Never Smoker    Smokeless tobacco: Never Used   Vaping Use    Vaping Use: Never used   Substance and Sexual Activity    Alcohol use: Yes     Alcohol/week: 0.0 standard drinks     Comment:  VERY RARE    Drug use: No       LABORATORY RESULTS:     Labs Latest Ref Rng & Units 5/6/2022 5/5/2022 5/4/2022 5/3/2022 5/2/2022 5/1/2022 4/30/2022   WBC 4.1 - 11.1 K/uL 6.0 6.0 6.5 5.3 5.7 5.5 5.6   RBC 4.10 - 5.70 M/uL 3.37(L) 3.19(L) 3.19(L) 3.19(L) 3.08(L) 2.92(L) 2.95(L)   Hemoglobin 12.1 - 17.0 g/dL 8.6(L) 8.2(L) 8.2(L) 8. 1(L) 7. 9(L) 7. 6(L) 7. 6(L)   Hematocrit 36.6 - 50.3 % 27. 6(L) 26. 0(L) 26. 4(L) 25. 7(L) 25. 8(L) 24. 7(L) 24. 6(L)   MCV 80.0 - 99.0 FL 81.9 81.5 82.8 80.6 83.8 84.6 83.4   Platelets 920 - 968 K/uL 358 326 293 315 298 274 275   Albumin 3.5 - 5.0 g/dL 2. 9(L) 2. 9(L) 3.0(L) 2. 8(L) 2. 9(L) 2. 6(L) 2. 8(L)   Calcium 8.5 - 10.1 MG/DL 8.9 8.6 8.4(L) 8.6 8.2(L) 8.0(L) 7. 7(L)   Glucose 65 - 100 mg/dL 111(H) 100 166(H) 102(H) 95 199(H) 125(H)   BUN 6 - 20 MG/DL 25(H) 24(H) 20 21(H) 18 17 24(H)   Creatinine 0.70 - 1.30 MG/DL 0.93 0.97 0.92 0.86 0.86 0.86 1.01   Sodium 136 - 145 mmol/L 136 137 134(L) 136 138 133(L) 137   Potassium 3.5 - 5.1 mmol/L 3.7 3.5 3.8 3.6 3.6 3.7 4.0   LDH 85 - 241 U/L 172 172 170 175 163 177 158   Some recent data might be hidden     Lab Results   Component Value Date/Time    TSH 2.26 04/10/2022 03:00 AM    TSH 1.68 03/01/2022 11:50 AM    TSH 1.47 05/26/2021 10:44 PM    TSH 1.97 05/20/2021 04:28 PM    TSH 1.41 02/09/2021 03:05 PM    TSH 1.740 08/14/2018 09:58 AM    TSH 1.710 10/18/2017 12:00 AM       ALLERGY:  Allergies   Allergen Reactions    Oxycodone Anaphylaxis    Pcn [Penicillins] Hives        CURRENT MEDICATIONS:    Current Outpatient Medications:    eplerenone (INSPRA) 25 mg tablet, Take 1 Tablet by mouth daily. , Disp: 90 Tablet, Rfl: 1    hydrALAZINE (APRESOLINE) 10 mg tablet, Take 1 Tablet by mouth three (3) times daily. , Disp: 90 Tablet, Rfl: 0    bumetanide (BUMEX) 2 mg tablet, Take 1 Tablet by mouth daily. Take 1 tablet by mouth daily, Disp: 90 Tablet, Rfl: 1    sacubitriL-valsartan (Entresto) 24-26 mg tablet, Take 1 Tablet by mouth two (2) times a day., Disp: 60 Tablet, Rfl: 1    dapagliflozin (FARXIGA) 10 mg tab tablet, Take 0.5 Tablets by mouth daily (with lunch). , Disp: 30 Tablet, Rfl: 3    utkxmadej-YA-ipbzzgxe-guaifen (Mucinex Fast-Max Cold-Flu) 10- mg/20 mL liqd, Take 20 mL by mouth every four (4) hours as needed for Cough. Take 20 mL by mouth every 4 hours as needed for cough, Disp: , Rfl:     tamsulosin (FLOMAX) 0.4 mg capsule, Take 1 Capsule by mouth daily. , Disp: 30 Capsule, Rfl: 2    docusate sodium (COLACE) 100 mg capsule, Take 1 Capsule by mouth daily for 90 days. , Disp: 30 Capsule, Rfl: 2    famotidine (PEPCID) 20 mg tablet, Take 1 Tablet by mouth every twelve (12) hours. , Disp: 60 Tablet, Rfl: 2    magnesium oxide (MAG-OX) 400 mg tablet, Take 1 Tablet by mouth daily. , Disp: 30 Tablet, Rfl: 2    mirtazapine (REMERON) 7.5 mg tablet, Take 1 Tablet by mouth nightly., Disp: 30 Tablet, Rfl: 2    sucralfate (CARAFATE) 1 gram tablet, Take 1 Tablet by mouth Before breakfast, lunch, dinner and at bedtime. , Disp: 120 Tablet, Rfl: 2    warfarin (COUMADIN) 2.5 mg tablet, Take 2 Tablets by mouth daily. , Disp: 90 Tablet, Rfl: 2    potassium chloride SR (K-TAB) 20 mEq tablet, Take 2 Tablets by mouth two (2) times a day., Disp: 360 Tablet, Rfl: 1    metFORMIN (GLUCOPHAGE) 500 mg tablet, Take 1 Tablet by mouth two (2) times daily (with meals). , Disp: 180 Tablet, Rfl: 3    acetaminophen (TYLENOL EXTRA STRENGTH) 500 mg tablet, Take 500 mg by mouth every six (6) hours as needed for Fever or Pain., Disp: , Rfl:     aspirin delayed-release 81 mg tablet, Take 81 mg by mouth daily. , Disp: , Rfl:     PATIENT CARE TEAM:  Patient Care Team:  Jaleel Higginbotham MD as PCP - General (Internal Medicine Physician)  Jaleel Higginbotham MD as PCP - [facility]  Anibal Fox MD as Physician (Cardiovascular Disease Physician)  Judy Sosa MD as Physician (Gastroenterology)  Braxton Mclaughlin MD as Physician (Orthopedic Surgery)  Linda Levine MD as Physician (Ophthalmology)  Riddhi Anand MD (Cardiovascular Disease Physician)  Chito Melton MD (Cardiovascular Disease Physician)     Thank you for allowing us to participate in this patient's care.     Jermaine Alonzo, EARNESTINE  [address] [place] [address], [address]  Phone: [phone]

## 2022-06-17 ENCOUNTER — HOME CARE VISIT (OUTPATIENT)
Dept: SCHEDULING | Facility: HOME HEALTH | Age: 77
End: 2022-06-17
Payer: MEDICARE

## 2022-06-17 PROCEDURE — G0299 HHS/HOSPICE OF RN EA 15 MIN: HCPCS

## 2022-06-19 VITALS
TEMPERATURE: 98.2 F | OXYGEN SATURATION: 98 % | RESPIRATION RATE: 20 BRPM | WEIGHT: 171 LBS | BODY MASS INDEX: 22.56 KG/M2

## 2022-06-20 ENCOUNTER — HOME CARE VISIT (OUTPATIENT)
Dept: SCHEDULING | Facility: HOME HEALTH | Age: 77
End: 2022-06-20
Payer: MEDICARE

## 2022-06-20 PROCEDURE — G0299 HHS/HOSPICE OF RN EA 15 MIN: HCPCS

## 2022-06-21 VITALS
RESPIRATION RATE: 20 BRPM | TEMPERATURE: 98.2 F | OXYGEN SATURATION: 98 % | WEIGHT: 173 LBS | BODY MASS INDEX: 22.82 KG/M2

## 2022-06-23 ENCOUNTER — TELEPHONE ANTICOAG (OUTPATIENT)
Dept: CARDIOLOGY CLINIC | Age: 77
End: 2022-06-23

## 2022-06-23 ENCOUNTER — HOME CARE VISIT (OUTPATIENT)
Dept: SCHEDULING | Facility: HOME HEALTH | Age: 77
End: 2022-06-23
Payer: MEDICARE

## 2022-06-23 LAB — INR, EXTERNAL: 3.6

## 2022-06-23 PROCEDURE — G0299 HHS/HOSPICE OF RN EA 15 MIN: HCPCS

## 2022-06-24 VITALS
RESPIRATION RATE: 20 BRPM | OXYGEN SATURATION: 98 % | TEMPERATURE: 98.2 F | BODY MASS INDEX: 22.96 KG/M2 | WEIGHT: 174 LBS

## 2022-06-27 ENCOUNTER — TELEPHONE (OUTPATIENT)
Dept: CARDIOLOGY CLINIC | Age: 77
End: 2022-06-27

## 2022-06-27 ENCOUNTER — OFFICE VISIT (OUTPATIENT)
Dept: INTERNAL MEDICINE CLINIC | Age: 77
End: 2022-06-27
Payer: MEDICARE

## 2022-06-27 ENCOUNTER — HOME CARE VISIT (OUTPATIENT)
Dept: SCHEDULING | Facility: HOME HEALTH | Age: 77
End: 2022-06-27
Payer: MEDICARE

## 2022-06-27 ENCOUNTER — HOME CARE VISIT (OUTPATIENT)
Dept: HOME HEALTH SERVICES | Facility: HOME HEALTH | Age: 77
End: 2022-06-27
Payer: MEDICARE

## 2022-06-27 VITALS
HEART RATE: 74 BPM | TEMPERATURE: 98.1 F | WEIGHT: 185.2 LBS | RESPIRATION RATE: 20 BRPM | BODY MASS INDEX: 24.55 KG/M2 | HEIGHT: 73 IN | OXYGEN SATURATION: 100 %

## 2022-06-27 DIAGNOSIS — I50.22 SYSTOLIC CHF, CHRONIC (HCC): ICD-10-CM

## 2022-06-27 DIAGNOSIS — E11.51 TYPE 2 DIABETES, CONTROLLED, WITH PERIPHERAL CIRCULATORY DISORDER (HCC): ICD-10-CM

## 2022-06-27 DIAGNOSIS — F32.A DEPRESSIVE DISORDER IN REMISSION: ICD-10-CM

## 2022-06-27 DIAGNOSIS — I25.708 CORONARY ARTERY DISEASE INVOLVING CORONARY BYPASS GRAFT OF NATIVE HEART WITH OTHER FORMS OF ANGINA PECTORIS (HCC): ICD-10-CM

## 2022-06-27 DIAGNOSIS — C34.92 ADENOCARCINOMA OF LUNG, LEFT (HCC): ICD-10-CM

## 2022-06-27 DIAGNOSIS — E78.2 MIXED HYPERLIPIDEMIA: ICD-10-CM

## 2022-06-27 DIAGNOSIS — E78.2 MIXED HYPERLIPIDEMIA: Primary | ICD-10-CM

## 2022-06-27 PROBLEM — E43 SEVERE PROTEIN-CALORIE MALNUTRITION (HCC): Status: RESOLVED | Noted: 2021-05-21 | Resolved: 2022-06-27

## 2022-06-27 PROCEDURE — G8427 DOCREV CUR MEDS BY ELIG CLIN: HCPCS | Performed by: INTERNAL MEDICINE

## 2022-06-27 PROCEDURE — G0299 HHS/HOSPICE OF RN EA 15 MIN: HCPCS

## 2022-06-27 PROCEDURE — 99214 OFFICE O/P EST MOD 30 MIN: CPT | Performed by: INTERNAL MEDICINE

## 2022-06-27 PROCEDURE — G8420 CALC BMI NORM PARAMETERS: HCPCS | Performed by: INTERNAL MEDICINE

## 2022-06-27 PROCEDURE — G8510 SCR DEP NEG, NO PLAN REQD: HCPCS | Performed by: INTERNAL MEDICINE

## 2022-06-27 PROCEDURE — 1101F PT FALLS ASSESS-DOCD LE1/YR: CPT | Performed by: INTERNAL MEDICINE

## 2022-06-27 PROCEDURE — G8536 NO DOC ELDER MAL SCRN: HCPCS | Performed by: INTERNAL MEDICINE

## 2022-06-27 RX ORDER — DOCUSATE SODIUM 100 MG/1
100 CAPSULE, LIQUID FILLED ORAL DAILY
Qty: 90 CAPSULE | Refills: 0 | Status: SHIPPED | COMMUNITY
Start: 2022-06-27 | End: 2022-09-25

## 2022-06-27 RX ORDER — LANOLIN ALCOHOL/MO/W.PET/CERES
400 CREAM (GRAM) TOPICAL DAILY
Qty: 90 TABLET | Refills: 0 | Status: SHIPPED | COMMUNITY
Start: 2022-06-27 | End: 2022-08-02

## 2022-06-27 RX ORDER — MIRTAZAPINE 7.5 MG/1
7.5 TABLET, FILM COATED ORAL
Qty: 90 TABLET | Refills: 0 | Status: SHIPPED | COMMUNITY
Start: 2022-06-27 | End: 2022-11-02 | Stop reason: ALTCHOICE

## 2022-06-27 NOTE — TELEPHONE ENCOUNTER
1123 - called and left a message for [facility] to call regarding report from patient's [address] ICD report. Awaiting a call back. [phone] - received a call from Travis at [facility]. Per Travis, the ICD report is accurate, patient only has a V lead and is not paced above 40. Reviewed with ALICIA Wong NP who requested patient make an appointment with [facility] asa. Called and spoke with patient's wife who stated she would call tomorrow morning for an appointment with [facility]. Called and spoke with Travis at [facility] and let her know patient would be calling for an appointment.

## 2022-06-27 NOTE — PROGRESS NOTES
HISTORY OF PRESENT ILLNESS  Soto Kwong is a 69 y.o. male. HPI  Assessment:  Sammy Haney is seen today accompanied by his wife, Kamaljit Lindsay, for follow up of CHF and other problems. 1. CHF. He is status post LVAD placement. He is having recurrent pleural effusion and had a recent thoracentesis, taking out about 2 liters. His breathing does improve after the procedure. 2. Lung cancer. Follows up with oncology. 3. Diabetes, hyperlipidemia. Due for labs. He is non fasting today. Will follow up as directed. He will continue to see specialists as directed. Review of Systems   Constitutional: Negative for weight loss. Respiratory: Negative. Cardiovascular: Negative for chest pain, palpitations, leg swelling and PND. Musculoskeletal: Negative for myalgias. Neurological: Negative for focal weakness. Physical Exam  Vitals and nursing note reviewed. Constitutional:       General: He is not in acute distress. Neck:      Vascular: No carotid bruit. Cardiovascular:      Rate and Rhythm: Normal rate and regular rhythm. Heart sounds: No murmur heard. No friction rub. No gallop. Pulmonary:      Effort: Pulmonary effort is normal. No respiratory distress. Breath sounds: Normal breath sounds. Musculoskeletal:      Right lower leg: No edema. Left lower leg: No edema. ASSESSMENT and PLAN  Diagnoses and all orders for this visit:    1. Mixed hyperlipidemia  -     LIPID PANEL; Future  -     HEPATIC FUNCTION PANEL; Future    2. Type 2 diabetes, controlled, with peripheral circulatory disorder (HCC)  -     METABOLIC PANEL, BASIC; Future  -     HEMOGLOBIN A1C WITH EAG; Future  -     MICROALBUMIN, UR, RAND W/ MICROALB/CREAT RATIO; Future    3. Systolic CHF, chronic ([address]    4. Adenocarcinoma of lung, left ([address]    5. Coronary artery disease involving coronary bypass graft of native heart with other forms of angina pectoris ([address]    6.  Depressive disorder in remission  - mirtazapine (REMERON) 7.5 mg tablet; Take 1 Tablet by mouth nightly. Other orders  -     docusate sodium (COLACE) 100 mg capsule; Take 1 Capsule by mouth daily for 90 days.  -     magnesium oxide (MAG-OX) 400 mg tablet; Take 1 Tablet by mouth daily.

## 2022-06-28 ENCOUNTER — TELEPHONE (OUTPATIENT)
Dept: CARDIOLOGY CLINIC | Age: 77
End: 2022-06-28

## 2022-06-28 ENCOUNTER — HOSPITAL ENCOUNTER (OUTPATIENT)
Dept: NON INVASIVE DIAGNOSTICS | Age: 77
Discharge: HOME | End: 2022-06-28
Attending: NURSE PRACTITIONER
Payer: MEDICARE

## 2022-06-28 ENCOUNTER — CLINICAL SUPPORT (OUTPATIENT)
Dept: CARDIOLOGY CLINIC | Age: 77
End: 2022-06-28
Payer: MEDICARE

## 2022-06-28 VITALS
HEART RATE: 69 BPM | TEMPERATURE: 98.4 F | BODY MASS INDEX: 23.33 KG/M2 | WEIGHT: 176 LBS | SYSTOLIC BLOOD PRESSURE: 78 MMHG | OXYGEN SATURATION: 100 % | HEIGHT: 73 IN | RESPIRATION RATE: 16 BRPM

## 2022-06-28 VITALS
RESPIRATION RATE: 20 BRPM | TEMPERATURE: 98.2 F | BODY MASS INDEX: 22.82 KG/M2 | OXYGEN SATURATION: 98 % | WEIGHT: 173 LBS

## 2022-06-28 VITALS — HEIGHT: 73 IN | BODY MASS INDEX: 23.32 KG/M2 | SYSTOLIC BLOOD PRESSURE: 78 MMHG | WEIGHT: 175.93 LBS

## 2022-06-28 DIAGNOSIS — Z95.811 LVAD (LEFT VENTRICULAR ASSIST DEVICE) PRESENT (HCC): ICD-10-CM

## 2022-06-28 DIAGNOSIS — I50.9 CHRONIC CONGESTIVE HEART FAILURE, UNSPECIFIED HEART FAILURE TYPE (HCC): Primary | ICD-10-CM

## 2022-06-28 DIAGNOSIS — I50.22 CHRONIC SYSTOLIC HEART FAILURE (HCC): ICD-10-CM

## 2022-06-28 LAB
ALBUMIN SERPL-MCNC: 3.1 G/DL (ref 3.5–5)
ALBUMIN/GLOB SERPL: 0.8 {RATIO} (ref 1.1–2.2)
ALP SERPL-CCNC: 189 U/L (ref 45–117)
ALT SERPL-CCNC: 13 U/L (ref 12–78)
ANION GAP SERPL CALC-SCNC: 9 MMOL/L (ref 5–15)
AST SERPL-CCNC: 44 U/L (ref 15–37)
BILIRUB DIRECT SERPL-MCNC: 0.2 MG/DL (ref 0–0.2)
BILIRUB SERPL-MCNC: 0.5 MG/DL (ref 0.2–1)
BUN SERPL-MCNC: 32 MG/DL (ref 6–20)
BUN/CREAT SERPL: 27 (ref 12–20)
CALCIUM SERPL-MCNC: 8.7 MG/DL (ref 8.5–10.1)
CHLORIDE SERPL-SCNC: 100 MMOL/L (ref 97–108)
CHOLEST SERPL-MCNC: 161 MG/DL
CO2 SERPL-SCNC: 28 MMOL/L (ref 21–32)
CREAT SERPL-MCNC: 1.2 MG/DL (ref 0.7–1.3)
CREAT UR-MCNC: 20.2 MG/DL
ECHO AO ROOT DIAM: 3.9 CM
ECHO AO ROOT INDEX: 1.91 CM/M2
ECHO AV AREA PEAK VELOCITY: 2.3 CM2
ECHO AV AREA VTI: 2.9 CM2
ECHO AV AREA/BSA PEAK VELOCITY: 1.1 CM2/M2
ECHO AV AREA/BSA VTI: 1.4 CM2/M2
ECHO AV MEAN GRADIENT: 1 MMHG
ECHO AV MEAN VELOCITY: 0.4 M/S
ECHO AV PEAK GRADIENT: 1 MMHG
ECHO AV PEAK VELOCITY: 0.5 M/S
ECHO AV VELOCITY RATIO: 0.6
ECHO AV VTI: 6.8 CM
ECHO EST RA PRESSURE: 3 MMHG
ECHO LA DIAMETER INDEX: 1.62 CM/M2
ECHO LA DIAMETER: 3.3 CM
ECHO LA TO AORTIC ROOT RATIO: 0.85
ECHO LA VOL 4C: 73 ML (ref 18–58)
ECHO LA VOLUME INDEX A4C: 36 ML/M2 (ref 16–34)
ECHO LV E' LATERAL VELOCITY: 14 CM/S
ECHO LV E' SEPTAL VELOCITY: 8 CM/S
ECHO LV EJECTION FRACTION A2C: 30 %
ECHO LV EJECTION FRACTION A4C: 30 %
ECHO LV FRACTIONAL SHORTENING: 13 % (ref 28–44)
ECHO LV INTERNAL DIMENSION DIASTOLE INDEX: 2.7 CM/M2
ECHO LV INTERNAL DIMENSION DIASTOLIC: 5.5 CM (ref 4.2–5.9)
ECHO LV INTERNAL DIMENSION SYSTOLIC INDEX: 2.35 CM/M2
ECHO LV INTERNAL DIMENSION SYSTOLIC: 4.8 CM
ECHO LV IVSD: 1.3 CM (ref 0.6–1)
ECHO LV MASS 2D: 227.4 G (ref 88–224)
ECHO LV MASS INDEX 2D: 111.5 G/M2 (ref 49–115)
ECHO LV POSTERIOR WALL DIASTOLIC: 0.8 CM (ref 0.6–1)
ECHO LV RELATIVE WALL THICKNESS RATIO: 0.29
ECHO LVOT AREA: 4.5 CM2
ECHO LVOT AV VTI INDEX: 0.66
ECHO LVOT CARDIAC OUTPUT: 2.4 LITER/MINUTE
ECHO LVOT CARDIAC OUTPUT: 2.4 LITER/MINUTE
ECHO LVOT CARDIAC OUTPUT: 2.8 LITER/MINUTE
ECHO LVOT CARDIAC OUTPUT: 2.8 LITER/MINUTE
ECHO LVOT DIAM: 2.4 CM
ECHO LVOT MEAN GRADIENT: 0 MMHG
ECHO LVOT PEAK GRADIENT: 0 MMHG
ECHO LVOT PEAK VELOCITY: 0.3 M/S
ECHO LVOT STROKE VOLUME INDEX: 10 ML/M2
ECHO LVOT SV: 20.3 ML
ECHO LVOT VTI: 4.5 CM
ECHO MV A VELOCITY: 0.39 M/S
ECHO MV E DECELERATION TIME (DT): 165.2 MS
ECHO MV E VELOCITY: 0.85 M/S
ECHO MV E/A RATIO: 2.18
ECHO MV E/E' LATERAL: 6.07
ECHO MV E/E' RATIO (AVERAGED): 8.35
ECHO MV E/E' SEPTAL: 10.63
ECHO PV MAX VELOCITY: 0.8 M/S
ECHO PV MEAN GRADIENT: 1 MMHG
ECHO PV MEAN VELOCITY: 0.5 M/S
ECHO PV PEAK GRADIENT: 2 MMHG
ECHO PV VTI: 10.4 CM
ECHO RIGHT VENTRICULAR SYSTOLIC PRESSURE (RVSP): 25 MMHG
ECHO RV FREE WALL PEAK S': 5 CM/S
ECHO RV TAPSE: 0.6 CM (ref 1.7–?)
ECHO TV REGURGITANT MAX VELOCITY: 2.35 M/S
ECHO TV REGURGITANT PEAK GRADIENT: 22 MMHG
EST. AVERAGE GLUCOSE BLD GHB EST-MCNC: 131 MG/DL
GLOBULIN SER CALC-MCNC: 4 G/DL (ref 2–4)
GLUCOSE SERPL-MCNC: 137 MG/DL (ref 65–100)
HBA1C MFR BLD: 6.2 % (ref 4–5.6)
HDLC SERPL-MCNC: 46 MG/DL
HDLC SERPL: 3.5 {RATIO} (ref 0–5)
LDLC SERPL CALC-MCNC: 91 MG/DL (ref 0–100)
MICROALBUMIN UR-MCNC: 0.68 MG/DL
MICROALBUMIN/CREAT UR-RTO: 34 MG/G (ref 0–30)
POTASSIUM SERPL-SCNC: 4.4 MMOL/L (ref 3.5–5.1)
PROT SERPL-MCNC: 7.1 G/DL (ref 6.4–8.2)
SODIUM SERPL-SCNC: 137 MMOL/L (ref 136–145)
TRIGL SERPL-MCNC: 120 MG/DL (ref ?–150)
UR CULT HOLD, URHOLD: NORMAL
VLDLC SERPL CALC-MCNC: 24 MG/DL

## 2022-06-28 PROCEDURE — 93306 TTE W/DOPPLER COMPLETE: CPT

## 2022-06-28 PROCEDURE — 93750 INTERROGATION VAD IN PERSON: CPT | Performed by: NURSE PRACTITIONER

## 2022-06-28 PROCEDURE — 99211 OFF/OP EST MAY X REQ PHY/QHP: CPT | Performed by: NURSE PRACTITIONER

## 2022-06-28 NOTE — PATIENT INSTRUCTIONS
No Medication changes. Testing Ordered:  Log files downloaded from LVAD  and sent to [employer] for review. 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 Your urine should be clear and light yellow straw colored. Follow up in Thursday 6/30/22 with Nicolas AvendañoKarol      For further patient and caregiver resources as well as access to online LVAD support groups visit [email or web address]       Please bring your daily sheets and medications to your next appointment. Please monitor your weights daily upon waking and after using the bathroom. Keep a written records of your weights and bring to your next appointment. If you have a weight gain of 3 or more pounds overnight OR 5 or more pounds in one week please contact our office. Thank you for allowing us the privilege of being a part of your healthcare team! Please do not hesitate to contact our office at [phone] with any questions or concerns. Virtual Heart Failure [address] invites you to learn more about heart failure and to share your questions, ideas, and experiences with others. Each month, the Heart Failure Support Group features a new educational topic and a guest speaker, followed by an interactive discussion. Our Heart Failure Nurse Navigator will moderate each session. You will be able to participate by phone, tablet or computer through [address] [place] [address]. This support group takes place on the 3rd Thursday of each month from 6:00-7:30PM. All individuals living with heart failure and their caregivers are welcome to join. If you are interested in participating, please contact us at [email or web address] and you will be sent the link to join the [employer].

## 2022-06-28 NOTE — PROGRESS NOTES
[facility], [address] [place] [address]  [id number] [address] [facility] [address]  [phone]      Jose Fernandes :2010 MRN:[id number]      10/17/Pritesh Jiménez is a 69 y.o. male with a history of ICM with Heartmate 3 implant date of 4/5/2022. Patient was seen in clinic for visit with LVAD Coordinator following a Backup Battery Fault on 6/27/22 @ 7627 - 0927. LVAD interrogation showed Backup Battery Fault alarm but shows backup battery is good for 32 months. Reviewed with Wilfred Martin NP who recommended downloading LVAD Log files and sending to [employer]. Log files downloaded and sent to [employer]. Patient is scheduled for routine follow up on 6/30/22 with Cory Munguia NP. Visit Vitals  BP (!) 78/0 (BP 1 Location: Right arm, BP Patient Position: Sitting, BP Cuff Size: Adult) Comment: radial pulse not present   Pulse 69   Temp 98.4 °F (36.9 °C) (Oral)   Resp 16   Ht 6' 1\" (1.854 m)   Wt 176 lb (79.8 kg)   SpO2 100%   BMI 23.22 kg/m²        LVAD   Pump Speed (RPM): 4800  Pump Flow (LPM): 4.2  MAP: 78  PI (Pulsitility Index): 3.9  Power: 3.2  Back Up  at Bedside & Labeled: Yes  Outpatient: Yes  MAP in Therapeutic Range (Outpatient): Yes  Testing  Alarms Reviewed: Yes  Back up SC speed: 4800  Back up Low Speed Limit: [id number], RN  VAD Coordinator    8280 - Received secure email from [employer] with recommendations to reseat Back Up Battery in the [employer]. Met with patient at [address] following his Echo and reseated the Backup Battery in the [employer] and cleaned the metal contacts on the batteries and the battery clips. which parent walks around the corner, out of site and we play a game in session. Conducted frequent anxiety checks. Processed the exposures.   R:  Parent demonstrated understanding of exposure process. Patient worked with provider to create a hierarchy and selected the tasks he wanted to try first. Patient did not seem to grasp the idea of coping thoughts or coaching himself through the situation. He did practice controlled breathing by bubble blowing and counting. Patient reported that his anxiety was at a 0 during all of the exposures today, including our last exposure of mom going around the corner without us watching. He engaged in card games and talked with this provider when parent was out of the room. Patient was not open to parent going in the waiting room today, so we plan to try this next session. Patient's brother did not participate in the exposures today.   P:  Client to return to clinic in 2 weeks. Next session we are going to have parent wait in the waiting room.     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

## 2022-06-28 NOTE — TELEPHONE ENCOUNTER
Called and spoke with patient regarding call to after hours pager last evening with reports of yellow wrench alarm. Per patient's wife, patient has a back up battery alarm and green arrows are in view. Patient agreed to come to clinic at 10:00AM to have alarm assessed.

## 2022-06-28 NOTE — TELEPHONE ENCOUNTER
Received request from Houston Long RN requesting patient to come in at 10:00 a.m. Spoke with patient indicating he will make in today, 6/28/22. Chandrakant Castillo, Patient Care Coordinator.

## 2022-06-29 NOTE — PATIENT INSTRUCTIONS
Medication changes: Take an extra Bumex 2 mg dose on the afternoon for 2 days. After 2 days, resume Bumex 2 mg daily. Please take this to your pharmacy to notify them of the change in medications. Testing Ordered:  Lab work has been drawn today. You will be contacted with any abnormal results requiring changes to your current plan of care. Other Recommendations: You may shower using the new shower bag. Apply a shower guard over the dressing before showering. You must complete a dressing change after your shower. 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 Your urine should be clear and light yellow straw colored. Follow up in 2 weeks with Nicolas Hughes      For further patient and caregiver resources as well as access to online LVAD support groups visit [email or web address]       Please bring your daily sheets and medications to your next appointment. Please monitor your weights daily upon waking and after using the bathroom. Keep a written records of your weights and bring to your next appointment. If you have a weight gain of 3 or more pounds overnight OR 5 or more pounds in one week please contact our office. Thank you for allowing us the privilege of being a part of your healthcare team! Please do not hesitate to contact our office at [phone] with any questions or concerns. Virtual Heart Failure [address] invites you to learn more about heart failure and to share your questions, ideas, and experiences with others. Each month, the Heart Failure Support Group features a new educational topic and a guest speaker, followed by an interactive discussion. Our Heart Failure Nurse Navigator will moderate each session.  You will be able to participate by phone, tablet or computer through [employer]. This support group takes place on the 3rd Thursday of each month from 6:00-7:30PM. All individuals living with heart failure and their caregivers are welcome to join. If you are interested in participating, please contact us at [email or web address] and you will be sent the link to join the [employer].

## 2022-06-30 ENCOUNTER — HOSPITAL ENCOUNTER (OUTPATIENT)
Dept: GENERAL RADIOLOGY | Age: 77
Discharge: HOME | End: 2022-06-30
Payer: MEDICARE

## 2022-06-30 ENCOUNTER — OFFICE VISIT (OUTPATIENT)
Dept: CARDIOLOGY CLINIC | Age: 77
End: 2022-06-30
Payer: MEDICARE

## 2022-06-30 VITALS
HEIGHT: 73 IN | TEMPERATURE: 98.2 F | WEIGHT: 180 LBS | HEART RATE: 76 BPM | BODY MASS INDEX: 23.86 KG/M2 | RESPIRATION RATE: 16 BRPM | OXYGEN SATURATION: 98 % | SYSTOLIC BLOOD PRESSURE: 74 MMHG

## 2022-06-30 DIAGNOSIS — Z79.01 CHRONIC ANTICOAGULATION: Primary | ICD-10-CM

## 2022-06-30 DIAGNOSIS — I50.9 CHRONIC CONGESTIVE HEART FAILURE, UNSPECIFIED HEART FAILURE TYPE (HCC): ICD-10-CM

## 2022-06-30 DIAGNOSIS — J90 PLEURAL EFFUSION: ICD-10-CM

## 2022-06-30 DIAGNOSIS — R06.02 SOB (SHORTNESS OF BREATH): ICD-10-CM

## 2022-06-30 DIAGNOSIS — Z95.811 LVAD (LEFT VENTRICULAR ASSIST DEVICE) PRESENT (HCC): ICD-10-CM

## 2022-06-30 PROCEDURE — 93750 INTERROGATION VAD IN PERSON: CPT | Performed by: NURSE PRACTITIONER

## 2022-06-30 PROCEDURE — G8432 DEP SCR NOT DOC, RNG: HCPCS | Performed by: NURSE PRACTITIONER

## 2022-06-30 PROCEDURE — G8427 DOCREV CUR MEDS BY ELIG CLIN: HCPCS | Performed by: NURSE PRACTITIONER

## 2022-06-30 PROCEDURE — 99000 SPECIMEN HANDLING OFFICE-LAB: CPT | Performed by: NURSE PRACTITIONER

## 2022-06-30 PROCEDURE — G8536 NO DOC ELDER MAL SCRN: HCPCS | Performed by: NURSE PRACTITIONER

## 2022-06-30 PROCEDURE — 71046 X-RAY EXAM CHEST 2 VIEWS: CPT

## 2022-06-30 PROCEDURE — 1123F ACP DISCUSS/DSCN MKR DOCD: CPT | Performed by: NURSE PRACTITIONER

## 2022-06-30 PROCEDURE — 99214 OFFICE O/P EST MOD 30 MIN: CPT | Performed by: NURSE PRACTITIONER

## 2022-06-30 PROCEDURE — 1101F PT FALLS ASSESS-DOCD LE1/YR: CPT | Performed by: NURSE PRACTITIONER

## 2022-06-30 PROCEDURE — G8420 CALC BMI NORM PARAMETERS: HCPCS | Performed by: NURSE PRACTITIONER

## 2022-06-30 NOTE — PROGRESS NOTES
[facility] in [facility],  [address] Visit      Patient name: Shola Carver  Patient : 1945  Patient MRN: [id number]  Date of service: 22    Chief Complaint   Patient presents with    Follow-up     LVAD       PLAN OF CARE:  · 69 y.o. male with severe ischemic cardiomyopathy, LVEF 15-20% s/p HM3 LVAD implant as DT on 22 by Dr. Carmen Jeter c/b intraoperative bleeding requiring multiple blood products and subsequent RV failure with severe TR requiring prolonged dobutamine wean and long term CT support   · Will need surveillance monthly echos to monitor TR, chest xrays to monitor pleural effusion, and aggressive PT/OT and nutritional support with weekly prealbumins and iron profiles  · S/p left thoracentesis 22 by IR; 1.4L removed    RECOMMENDATIONS:  Continue speed 4800rpms, low speed 4400  No BB due to RV failure   Continue Entresto 24/26mg BID; pt currently taking half tab of 49/51mg BID, new rx sent for 24/26mg tabs  Cont Eplerenone 25mg daily  Bumex 2 mg PO daily, take additional 2mg in the afternoon for 2 days, monitor weights. Continue Farxiga 5mg daily  Continue hydralazine 10mg TID  Continue warfarin, goal INR 2-3  ASA 81mg daily  Continue Flomax 0.4mg daily  Monitor prealbumin and iron profile with  labs   Driveline dressing changes 3 times a week  Ok to start showering. Will provide eqpt and initial education.    CPAP qHS   Advanced care plan forms on file   Strict I/O, daily weights, Na+ restricted diet   Continue VAD education   Continue  SN, completed PT and OT  CXR done today- small left effusion  Echo done   Will have CT chest as follow up on lung nodule for heme/onc  Follow up in clinic in 2 weeks       IMPRESSION:  S/p LVAD implant as DT  · Post-op RV failure requiring slow wean of dobutamine  · Persistent large volume chest tube output  Chronic systolic heart failure  · Stage D, NYHA class IV symptoms  · Non-ischemic cardiomyopathy, LVEF 20% with LVEDD 6.2  · RV dysfunction  At risk of sudden cardiac death  · Recent cardiac arrest   · S/p ICD (1/2013, [facility] followed by [address] [place] [address]); New implant on 10/21/2021 [employer]  Coronary artery disease  · S/p multiple interventions  · S/p 4V CABG (8/2012)  · LHC (7/2019) severe stenosis of LIMA to LAD anastomosis site, SVG graft to OM is occluded, SVG graft to RCA 40-50%, LAD occluded proximally, severe proximal LCx, RCA is the long . · PET (6/2019) EF 24% with anterior lateral and inferior reversible defect  Cardiac risk factors  · HTN  · HL  · DM2  · SRUTHI on CPAP  · MIld carotid stenosis  Anemia, microcytic  · Iron deficiency  DVT with filter  Pulmonary nodules   Dysphagia       CARDIAC IMAGING AND DIAGNOSTICS REVIEWED:  Echo (6/28/22): LVEF 25 - 30%. Mildly increased wall thickness. Severe global hypokinesis present    Echo (6/2/22) LVEF 20-25%, mild MR, mod to severe TR, LVIDd 4.9cm, RVIDd 6cm, TAPSE 0.9cm     Echo (5/4/22) LVEF 15-20%, mod TR, LVIDd 5.3 cm, TAPSE 0.9 cm     Echo (4/26/22) - LVIDd 4.9 cm, TAPSE 0.5 cm,     Echo (4/18/22)- LVEF 15-20%, severe TR, TAPSE 0.5cm, RVIDd 6cm     Echo (4/8/22): LVEF 10-15%. Severe global hypokinesis present. 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 Echo (3/2/22): LVEF 10-15%; Mod-severe MR      Echo (11/23/21): LVEF 15-20%; Mod-severe, MR, mod-TR     Echo (5/20/21): LVEF is 20 - 25%. Severely and globally reduced systolic function. Severe (grade 4) left ventricular diastolic dysfunction. EKG (5/20/21) SB with 1degree AV block, QRS 116ms     LHC (5/24/21) Native Coronaries: LM - moderate to severe distal disease 50%. % prox occluded (), heavily calcified, LCx: 80% proximal stenosis. OM1 is  (seen filling faintly via collaterals). OM2 99% stenosis. RCA: 100% proximal occluded.  LIMA to LAD: patent, supplies only a diagonal branch (probably D2). The LAD distal to the bifurcation is 100% occluded () and fills via right to left collaterals off the SVG to RCA. SVG to R-PDA: patent with moderate diffuse irregularities but no obstructive disease in the graft. No appealing interventional targets. [address] Jose Luis Leontseringin Report 5/4/2022 5/2/2022 11/12/2021 7/6/2021 5/20/2021   (PRE) HR 82 82 88 98 74   (PRE) O2 Sat - 98 100 - 99   (POST) HR 91 88 102 - 81   (POST) O2 Sat - 98 99 98% on Ra 99   Distance in Meters 191.41 181.36 386. 58 - 1158.24     CXR Results  (Last 48 hours)               06/02/22 1108  XR CHEST PA LAT Final result    Impression:      Increased left-sided pleural effusion and atelectasis. Narrative:  Clinical history: Hx of effusion, s/p LVAD   INDICATION:   Hx of effusion, s/p LVAD   COMPARISON: 5/10/2020       FINDINGS:    PA and lateral views of the chest are obtained. The cardiopericardial silhouette is stable in appearance status post anatomy. Cardiac pacer and left ventricular assist device redemonstrated. Increased   left-sided pleural effusion and atelectasis. BRIEF HISTORY OF PRESENT ILLNESS:  Antonio Bradshaw is a 69 y.o. male with h/o HTN, HL, DM2, SRUTHI on CPAP, chronic systolic heart failure, stage D, NYHA class IV symptoms, non-ischemic cardiomyopathy, LVEF 20% with LVEDD 6.2, RV dysfunciton, TAPSE 1.22, TBili 1.5 likely from cardiac congestion, recent cardiac arrest s/p ICD (1/2013, [address] [facility] by [facility]), coronary artery disease s/p multiple interventions s/p 4V CABG (8/2012), LHC (7/2019) severe stenosis of LIMA to LAD anastomosis site, SVG graft to OM is occluded, SVG graft to RCA 40-50%, LAD occluded proximally, severe proximal LCx, RCA is the long . PET (6/2019) EF 24% with anterior lateral and inferior reversible defect. Anemia, microcytic with iron deficiency, DVT with filter.      Patient was referred to [facility] by Dr. Felipe Cordova in 2021 for evaluation of his candidacy for advanced therapies. Patient agreed to inpatient initiation of palliative infusion of chronic inotropes and evaluation for LVAD-DT. Patient was admitted 5/2-5/25/21. Patient was started on milrinone infusion and had first part of LVAD evaluation completed. Right and left heart cath on 5/24/21 showed severe diffuse native vessel CAD, with patent grafts (LIMA to D2, SVG to RCA). Pt was found to have pulmonary nodule during workup. Antiplatelet therapy was held during hospitalization and after discharge due to anticipated pulmonary nodule biopsy, bone marrow biopsy and EGD/C-Scope as part of LVAD workup. Pt found to have adenocarcinoma of the lung, and so LVAD was deferred pending treatment. Patient completed radiation treatment for adenocarcinoma of the lung. Hem-onc cleared patient for VAD implant with 90% 2 year prognosis. He presented to [facility] on 4/3 for admission for planned LVAD implant which he underwent on 4/5/22. Was re-do sternotomy, bleeding intra-op, required cryo, k-centra, FFP, Plt, and cellsaver in OR. Had RV dysfunction noted intra-op; requiring lower VAD speed and dual inotrope support. 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 He also had issues with significant CT drainage requiring prolonged placement. He was eventually discharged home on POD 29 with GDMT (no BB due to RV failure) with [place] SN/PT and OT. He had persistent left pleural effusion and underwent thoracentesis in IR on 6/9/22 with 1.4L removed. INTERVAL HISTORY:   Pt presents for LVAD follow up today. He reports feeling well. He has been busy the last week with frequent medical appointments. He has had some weight gain the last few days and noticed more shortness of breath. He is still walking and feels good with walking short distances. No issues with dizziness or lightheadedness. Reports appetite is good, normal BMs. No pain, tenderness or erythema at the driveline exit site. REVIEW OF SYSTEMS:  Review of Systems   Constitutional: Negative for chills, fever, malaise/fatigue and weight loss. Respiratory: Negative for cough and shortness of breath. Cardiovascular: Negative for chest pain, palpitations, orthopnea and leg swelling. Gastrointestinal: Negative for abdominal pain, constipation, diarrhea, heartburn, nausea and vomiting. Musculoskeletal: Negative for falls. Neurological: Negative for dizziness and weakness. Psychiatric/Behavioral: Negative for depression. PHYSICAL EXAM:  Visit Vitals  BP (!) 74/0 (BP 1 Location: Right arm, BP Patient Position: Sitting, BP Cuff Size: Adult)   Pulse 76   Temp 98.2 °F (36.8 °C) (Oral)   Resp 16   Ht 6' 1\" (1.854 m)   Wt 180 lb (81.6 kg)   SpO2 98%   BMI 23.75 kg/m²       LVAD INTERROGATION:  Device interrogated in person  Device function normal, normal flow, no events  LVAD   Pump Speed (RPM): 4800  Pump Flow (LPM): 4  MAP: 74  PI (Pulsitility Index): 4.1  Power: 3.2  Back Up  at Bedside & Labeled: Yes  Driveline Site Care: Yes  Driveline Dressing: Changed per order  Outpatient: Yes  MAP in Therapeutic Range (Outpatient): Yes  Testing  Alarms Reviewed: Yes  Back up SC speed: 4800  Back up Low Speed Limit: 4400  Emergency Equipment with Patient?: Yes  Emergency procedures reviewed?: Yes  Drive line site inspected?: Yes  Drive line intergrity inspected?: Yes  Drive line dressing changed?: Yes      Physical Exam  Vitals reviewed. Constitutional:       General: He is not in acute distress. Appearance: Normal appearance. Cardiovascular:      Rate and Rhythm: Normal rate and regular rhythm. Heart sounds: Normal heart sounds. Comments: LVAD Hum noted on auscultation  Pulmonary:      Effort: Pulmonary effort is normal. No respiratory distress. Abdominal:      General: Bowel sounds are normal. There is no distension. Palpations: Abdomen is soft. Tenderness:  There is no abdominal tenderness. Musculoskeletal:      Right lower leg: No edema. Left lower leg: No edema. Skin:     General: Skin is warm and dry. Capillary Refill: Capillary refill takes less than 2 seconds. Neurological:      General: No focal deficit present. Mental Status: He is alert and oriented to person, place, and time. Psychiatric:         Mood and Affect: Mood normal.         Behavior: Behavior normal.         Thought Content: Thought content normal.         Judgment: Judgment normal.            PAST MEDICAL HISTORY:  Past Medical History:   Diagnosis Date    CAD (coronary artery disease) '90 '97, '13 x 2    MI, code [place] in 2013 [facility]    Calculus of kidney     Colonic polyps     Congestive heart failure, unspecified     Diabetes ([facility] [address]     Gastritis     Hypercholesterolemia     Sleep apnea        PAST SURGICAL HISTORY:  Past Surgical History:   Procedure Laterality Date    COLONOSCOPY  04/06/2011    16, due 21    COLONOSCOPY N/A 3/2/2022    COLONOSCOPY    :- performed by I[facility] at [facility] ENDOSCOPY    ENDOSCOPY, COLON, DIAGNOSTIC      11, due 16    HX CORONARY ARTERY BYPASS GRAFT  8/22/12    x 4 vessel by MISSY LION    HX PACEMAKER PLACEMENT  1/30/13    [place] CARDIAC SURG PROCEDURE UNLIST  2012    x 4 vessels       FAMILY HISTORY:  Family History   Problem Relation Age of Onset    Heart Disease Mother         MI    Heart Disease Father         CAD & PVD    Lung Disease Father     Cancer Father         lung    Diabetes Maternal Grandmother     Heart Disease Other         CAD    Stroke Sister        SOCIAL HISTORY:  Social History     Socioeconomic History    Marital status:    Tobacco Use    Smoking status: Never Smoker    Smokeless tobacco: Never Used   Vaping Use    Vaping Use: Never used   Substance and Sexual Activity    Alcohol use:  Yes     Alcohol/week: 0.0 standard drinks     Comment:  VERY RARE    Drug use: No       LABORATORY RESULTS:     Labs Latest Ref Rng & Units 6/27/2022 5/6/2022 5/5/2022 5/4/2022 5/3/2022 5/2/2022 5/1/2022   WBC 4.1 - 11.1 K/uL - 6.0 6.0 6.5 5.3 5.7 5.5   RBC 4.10 - 5.70 M/uL - 3.37(L) 3.19(L) 3.19(L) 3.19(L) 3.08(L) 2.92(L)   Hemoglobin 12.1 - 17.0 g/dL - 8. 6(L) 8.2(L) 8.2(L) 8. 1(L) 7. 9(L) 7. 6(L)   Hematocrit 36.6 - 50.3 % - 27. 6(L) 26. 0(L) 26. 4(L) 25. 7(L) 25. 8(L) 24. 7(L)   MCV 80.0 - 99.0 FL - 81.9 81.5 82.8 80.6 83.8 84.6   Platelets 164 - 984 K/uL - 358 326 293 315 298 274   Albumin 3.5 - 5.0 g/dL 3. 1(L) 2. 9(L) 2. 9(L) 3.0(L) 2. 8(L) 2. 9(L) 2. 6(L)   Calcium 8.5 - 10.1 MG/DL 8.7 8.9 8.6 8.4(L) 8.6 8.2(L) 8.0(L)   Glucose 65 - 100 mg/dL 137(H) 111(H) 100 166(H) 102(H) 95 199(H)   BUN 6 - 20 MG/DL 32(H) 25(H) 24(H) 20 21(H) 18 17   Creatinine 0.70 - 1.30 MG/DL 1.20 0.93 0.97 0.92 0.86 0.86 0.86   Sodium 136 - 145 mmol/L 137 136 137 134(L) 136 138 133(L)   Potassium 3.5 - 5.1 mmol/L 4.4 3.7 3.5 3.8 3.6 3.6 3.7   LDH 85 - 241 U/L - 172 172 170 175 163 177   Some recent data might be hidden     Lab Results   Component Value Date/Time    TSH 2.26 04/10/2022 03:00 AM    TSH 1.68 03/01/2022 11:50 AM    TSH 1.47 05/26/2021 10:44 PM    TSH 1.97 05/20/2021 04:28 PM    TSH 1.41 02/09/2021 03:05 PM    TSH 1.740 08/14/2018 09:58 AM    TSH 1.710 10/18/2017 12:00 AM       ALLERGY:  Allergies   Allergen Reactions    Oxycodone Anaphylaxis    Pcn [Penicillins] Hives        CURRENT MEDICATIONS:    Current Outpatient Medications:     docusate sodium (COLACE) 100 mg capsule, Take 1 Capsule by mouth daily for 90 days. , Disp: 90 Capsule, Rfl: 0    magnesium oxide (MAG-OX) 400 mg tablet, Take 1 Tablet by mouth daily. , Disp: 90 Tablet, Rfl: 0    mirtazapine (REMERON) 7.5 mg tablet, Take 1 Tablet by mouth nightly., Disp: 90 Tablet, Rfl: 0    eplerenone (INSPRA) 25 mg tablet, Take 1 Tablet by mouth daily. , Disp: 90 Tablet, Rfl: 1    hydrALAZINE (APRESOLINE) 10 mg tablet, Take 1 Tablet by mouth three (3) times daily. , Disp: 90 Tablet, Rfl: 0    bumetanide (BUMEX) 2 mg tablet, Take 1 Tablet by mouth daily. Take 1 tablet by mouth daily, Disp: 90 Tablet, Rfl: 1    sacubitriL-valsartan (Entresto) 24-26 mg tablet, Take 1 Tablet by mouth two (2) times a day., Disp: 60 Tablet, Rfl: 1    dapagliflozin (FARXIGA) 10 mg tab tablet, Take 0.5 Tablets by mouth daily (with lunch). , Disp: 30 Tablet, Rfl: 3    dkvfzcklw-FN-agghtnzu-guaifen (Mucinex Fast-Max Cold-Flu) 10- mg/20 mL liqd, Take 20 mL by mouth every four (4) hours as needed for Cough. Take 20 mL by mouth every 4 hours as needed for cough, Disp: , Rfl:     tamsulosin (FLOMAX) 0.4 mg capsule, Take 1 Capsule by mouth daily. , Disp: 30 Capsule, Rfl: 2    famotidine (PEPCID) 20 mg tablet, Take 1 Tablet by mouth every twelve (12) hours. , Disp: 60 Tablet, Rfl: 2    sucralfate (CARAFATE) 1 gram tablet, Take 1 Tablet by mouth Before breakfast, lunch, dinner and at bedtime. , Disp: 120 Tablet, Rfl: 2    warfarin (COUMADIN) 2.5 mg tablet, Take 2 Tablets by mouth daily. , Disp: 90 Tablet, Rfl: 2    potassium chloride SR (K-TAB) 20 mEq tablet, Take 2 Tablets by mouth two (2) times a day., Disp: 360 Tablet, Rfl: 1    metFORMIN (GLUCOPHAGE) 500 mg tablet, Take 1 Tablet by mouth two (2) times daily (with meals). , Disp: 180 Tablet, Rfl: 3    acetaminophen (TYLENOL EXTRA STRENGTH) 500 mg tablet, Take 500 mg by mouth every six (6) hours as needed for Fever or Pain., Disp: , Rfl:     aspirin delayed-release 81 mg tablet, Take 81 mg by mouth daily. , Disp: , Rfl:     PATIENT CARE TEAM:  Patient Care Team:  David Sawant MD as PCP - General (Internal Medicine Physician)  David Sawant MD as PCP - [address] [place] [address] [facility]  Wesley Cardozo MD as Physician (Cardiovascular Disease Physician)  Jesse Mcmullen MD as Physician (Gastroenterology)  Loreta Presley MD as Physician (Orthopedic Surgery)  Red Aguayo MD as Physician (Ophthalmology)  Saralyn Angelucci, MD (Cardiovascular Disease Physician)  Meggan Patino MD (Cardiovascular Disease Physician)     Thank you for allowing us to participate in this patient's care.     Marilyn Naik NP  [address] [place] [address], [address]  Phone: [phone]

## 2022-06-30 NOTE — PROGRESS NOTES
Radha Bartlett is a 69 y.o. male with a history of ICM with Heartmate 3 implant date of 4/5/2022. Patient was seen in clinic for routine follow up with Andres Omalley NP. VAD interrogation completed and reported to ARTURO Nieves NP. Drive line dressing change completed per policy. No signs or symptoms of infection noted. Site is healed per Andres Omalley NP and patient may shower. Shower bags and shower guard provided to patient with a demonstration of how to use correctly. Patient verbalized understanding and agreed to change his dressing following each shower. Frida Singletary RN [facility]) made aware that patient may now shower.    Dakotah Plaza RN  VAD Coordinator

## 2022-07-01 ENCOUNTER — TELEPHONE ANTICOAG (OUTPATIENT)
Dept: CARDIOLOGY CLINIC | Age: 77
End: 2022-07-01

## 2022-07-01 ENCOUNTER — TRANSCRIBE ORDER (OUTPATIENT)
Dept: SCHEDULING | Age: 77
End: 2022-07-01

## 2022-07-01 DIAGNOSIS — C34.12 PRIMARY MALIGNANT NEOPLASM OF BRONCHUS OF LEFT UPPER LOBE (HCC): ICD-10-CM

## 2022-07-01 DIAGNOSIS — D50.9 IRON DEFICIENCY ANEMIA, UNSPECIFIED: Primary | ICD-10-CM

## 2022-07-01 LAB
ALBUMIN SERPL-MCNC: 3.5 G/DL (ref 3.7–4.7)
ALBUMIN/GLOB SERPL: 1 {RATIO} (ref 1.2–2.2)
ALP SERPL-CCNC: 199 IU/L (ref 44–121)
ALT SERPL-CCNC: 10 IU/L (ref 0–44)
AST SERPL-CCNC: 39 IU/L (ref 0–40)
BILIRUB SERPL-MCNC: 0.4 MG/DL (ref 0–1.2)
BUN SERPL-MCNC: 24 MG/DL (ref 8–27)
BUN/CREAT SERPL: 24 (ref 10–24)
CALCIUM SERPL-MCNC: 8.8 MG/DL (ref 8.6–10.2)
CHLORIDE SERPL-SCNC: 97 MMOL/L (ref 96–106)
CO2 SERPL-SCNC: 21 MMOL/L (ref 20–29)
CREAT SERPL-MCNC: 1 MG/DL (ref 0.76–1.27)
EGFR: 78 ML/MIN/1.73
ERYTHROCYTE [DISTWIDTH] IN BLOOD BY AUTOMATED COUNT: 16 % (ref 11.6–15.4)
GLOBULIN SER CALC-MCNC: 3.4 G/DL (ref 1.5–4.5)
GLUCOSE SERPL-MCNC: 110 MG/DL (ref 65–99)
HCT VFR BLD AUTO: 35.1 % (ref 37.5–51)
HGB BLD-MCNC: 10.3 G/DL (ref 13–17.7)
INR PPP: 1.9 (ref 0.9–1.2)
INR, EXTERNAL: 1.9
LDH SERPL-CCNC: 223 IU/L (ref 121–224)
MAGNESIUM SERPL-MCNC: 2.1 MG/DL (ref 1.6–2.3)
MCH RBC QN AUTO: 23.5 PG (ref 26.6–33)
MCHC RBC AUTO-ENTMCNC: 29.3 G/DL (ref 31.5–35.7)
MCV RBC AUTO: 80 FL (ref 79–97)
NT-PROBNP SERPL-MCNC: 1726 PG/ML (ref 0–486)
PLATELET # BLD AUTO: 238 X10E3/UL (ref 150–450)
POTASSIUM SERPL-SCNC: 5.1 MMOL/L (ref 3.5–5.2)
PROT SERPL-MCNC: 6.9 G/DL (ref 6–8.5)
PROTHROMBIN TIME: 19.6 SEC (ref 9.1–12)
RBC # BLD AUTO: 4.39 X10E6/UL (ref 4.14–5.8)
SODIUM SERPL-SCNC: 135 MMOL/L (ref 134–144)
WBC # BLD AUTO: 3.9 X10E3/UL (ref 3.4–10.8)

## 2022-07-02 ENCOUNTER — HOME CARE VISIT (OUTPATIENT)
Dept: SCHEDULING | Facility: HOME HEALTH | Age: 77
End: 2022-07-02
Payer: MEDICARE

## 2022-07-02 PROCEDURE — G0299 HHS/HOSPICE OF RN EA 15 MIN: HCPCS

## 2022-07-04 VITALS
OXYGEN SATURATION: 98 % | WEIGHT: 175 LBS | TEMPERATURE: 98.2 F | RESPIRATION RATE: 20 BRPM | BODY MASS INDEX: 23.09 KG/M2

## 2022-07-05 ENCOUNTER — TELEPHONE (OUTPATIENT)
Dept: CARDIOLOGY CLINIC | Age: 77
End: 2022-07-05

## 2022-07-05 ENCOUNTER — TELEPHONE ANTICOAG (OUTPATIENT)
Dept: CARDIOLOGY CLINIC | Age: 77
End: 2022-07-05

## 2022-07-05 ENCOUNTER — HOME CARE VISIT (OUTPATIENT)
Dept: SCHEDULING | Facility: HOME HEALTH | Age: 77
End: 2022-07-05
Payer: MEDICARE

## 2022-07-05 LAB — INR, EXTERNAL: 1.9

## 2022-07-05 PROCEDURE — G0299 HHS/HOSPICE OF RN EA 15 MIN: HCPCS

## 2022-07-05 NOTE — TELEPHONE ENCOUNTER
Received a message TEO Mcgrath RN [facility]) who stated patient has a weight gain of 9 lbs over a week. Called and spoke with patient's wife who stated patient has mild ankle swelling, but no SOB. Called and spoke with Letty Chaudhry NP who ordered Bumex 2 mg daily be increased to Bumex 2 mg BID x 3 days and to call in with weight on Thursday. Patient verbalized understanding.

## 2022-07-06 VITALS
RESPIRATION RATE: 20 BRPM | BODY MASS INDEX: 24.01 KG/M2 | TEMPERATURE: 96.9 F | WEIGHT: 182 LBS | OXYGEN SATURATION: 100 %

## 2022-07-07 ENCOUNTER — HOME CARE VISIT (OUTPATIENT)
Dept: SCHEDULING | Facility: HOME HEALTH | Age: 77
End: 2022-07-07
Payer: MEDICARE

## 2022-07-07 PROCEDURE — 400014 HH F/U

## 2022-07-07 PROCEDURE — G0299 HHS/HOSPICE OF RN EA 15 MIN: HCPCS

## 2022-07-08 VITALS
WEIGHT: 179 LBS | OXYGEN SATURATION: 99 % | BODY MASS INDEX: 23.62 KG/M2 | TEMPERATURE: 98.1 F | RESPIRATION RATE: 16 BRPM

## 2022-07-08 NOTE — TELEPHONE ENCOUNTER
7/7/22 - received a message from TEO Mcgrath RN ([facility]) who stated patient's weight was down from 182 lbs to 179 lbs following 2nd of 3 day increase of Bumex. (See note 7/5/22). Luana Tam NP made aware.

## 2022-07-11 ENCOUNTER — HOME CARE VISIT (OUTPATIENT)
Dept: SCHEDULING | Facility: HOME HEALTH | Age: 77
End: 2022-07-11
Payer: MEDICARE

## 2022-07-11 PROCEDURE — G0299 HHS/HOSPICE OF RN EA 15 MIN: HCPCS

## 2022-07-11 NOTE — PATIENT INSTRUCTIONS
Medication changes:    INCREASE Farxiga to 10mg (whole tablet) by mouth daily    You may STOP carafate once you complete the bottle you currently have at home      Testing Ordered:    None today    Home Health to discharge you next week. A referral will be sent to cardiac rehab. They will be reaching out to schedule. 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 Your urine should be clear and light yellow straw colored. Follow up in 1 month with Rudolph Mccloud      For further patient and caregiver resources as well as access to online LVAD support groups visit [email or web address]       Please bring your daily sheets and medications to your next appointment. Please monitor your weights daily upon waking and after using the bathroom. Keep a written records of your weights and bring to your next appointment. If you have a weight gain of 3 or more pounds overnight OR 5 or more pounds in one week please contact our office. Thank you for allowing us the privilege of being a part of your healthcare team! Please do not hesitate to contact our office at [phone] with any questions or concerns. Virtual Heart Failure [address] invites you to learn more about heart failure and to share your questions, ideas, and experiences with others. Each month, the Heart Failure Support Group features a new educational topic and a guest speaker, followed by an interactive discussion. Our Heart Failure Nurse Navigator will moderate each session. You will be able to participate by phone, tablet or computer through [address] [place] [address].  This support group takes place on the 3rd Thursday of each month from 6:00-7:30PM. All individuals living with heart failure and their caregivers are welcome to join. If you are interested in participating, please contact us at [email or web address] and you will be sent the link to join the [employer].

## 2022-07-12 ENCOUNTER — TELEPHONE (OUTPATIENT)
Dept: CARDIOLOGY CLINIC | Age: 77
End: 2022-07-12

## 2022-07-12 VITALS
TEMPERATURE: 98.2 F | BODY MASS INDEX: 23.62 KG/M2 | RESPIRATION RATE: 20 BRPM | OXYGEN SATURATION: 98 % | WEIGHT: 179 LBS

## 2022-07-12 NOTE — TELEPHONE ENCOUNTER
Telephone Call RE:  Appointment reminder     Outcome:     [] Patient confirmed appointment   [] Patient rescheduled appointment for    [x] Unable to reach  [x] Left message             [] Other:     ---------------------             [] Screened for [facility]

## 2022-07-13 ENCOUNTER — HOME CARE VISIT (OUTPATIENT)
Dept: SCHEDULING | Facility: HOME HEALTH | Age: 77
End: 2022-07-13
Payer: MEDICARE

## 2022-07-13 ENCOUNTER — TELEPHONE ANTICOAG (OUTPATIENT)
Dept: CARDIOLOGY CLINIC | Age: 77
End: 2022-07-13

## 2022-07-13 LAB — INR, EXTERNAL: 1.5

## 2022-07-13 PROCEDURE — G0299 HHS/HOSPICE OF RN EA 15 MIN: HCPCS

## 2022-07-13 NOTE — CARDIO/PULMONARY
Alverto Pasadena Sr  69 y.o. With diagnosis of 29 attended phase II cardiac rehab for 07/01/2021 - 01/31/2022. Santa Oliver had been on hold due to receiving a LVAD and receiving home health. He has many medical issues that prevent him from attending CR at this time.   He has been discharged from our program.    Karina Sutton RN  7/13/2022

## 2022-07-13 NOTE — CARDIO/PULMONARY
I spoke with Gin Bardales about returning to cardiac rehab. He stated that he is still receiving home health and according to his appointment desk, [facility] continues until September. He stated that he saw his PCP yesterday and they turned off his AICD as it is not functioning properly and he is not physically able to have another surgery to replace it at this time due to his condition. He feels that it is best to discharge him from our program at this time.

## 2022-07-14 ENCOUNTER — OFFICE VISIT (OUTPATIENT)
Dept: CARDIOLOGY CLINIC | Age: 77
End: 2022-07-14
Payer: MEDICARE

## 2022-07-14 VITALS
RESPIRATION RATE: 18 BRPM | OXYGEN SATURATION: 99 % | BODY MASS INDEX: 24.39 KG/M2 | TEMPERATURE: 97.5 F | WEIGHT: 184 LBS | HEIGHT: 73 IN | SYSTOLIC BLOOD PRESSURE: 86 MMHG

## 2022-07-14 VITALS
TEMPERATURE: 97.1 F | OXYGEN SATURATION: 100 % | RESPIRATION RATE: 20 BRPM | BODY MASS INDEX: 23.88 KG/M2 | WEIGHT: 181 LBS

## 2022-07-14 DIAGNOSIS — I50.9 CHRONIC CONGESTIVE HEART FAILURE, UNSPECIFIED HEART FAILURE TYPE (HCC): Primary | ICD-10-CM

## 2022-07-14 DIAGNOSIS — Z95.811 LVAD (LEFT VENTRICULAR ASSIST DEVICE) PRESENT (HCC): ICD-10-CM

## 2022-07-14 PROCEDURE — G8420 CALC BMI NORM PARAMETERS: HCPCS | Performed by: NURSE PRACTITIONER

## 2022-07-14 PROCEDURE — G8427 DOCREV CUR MEDS BY ELIG CLIN: HCPCS | Performed by: NURSE PRACTITIONER

## 2022-07-14 PROCEDURE — 93750 INTERROGATION VAD IN PERSON: CPT | Performed by: NURSE PRACTITIONER

## 2022-07-14 PROCEDURE — 1123F ACP DISCUSS/DSCN MKR DOCD: CPT | Performed by: NURSE PRACTITIONER

## 2022-07-14 PROCEDURE — 1101F PT FALLS ASSESS-DOCD LE1/YR: CPT | Performed by: NURSE PRACTITIONER

## 2022-07-14 PROCEDURE — G8432 DEP SCR NOT DOC, RNG: HCPCS | Performed by: NURSE PRACTITIONER

## 2022-07-14 PROCEDURE — 99215 OFFICE O/P EST HI 40 MIN: CPT | Performed by: NURSE PRACTITIONER

## 2022-07-14 PROCEDURE — G8536 NO DOC ELDER MAL SCRN: HCPCS | Performed by: NURSE PRACTITIONER

## 2022-07-14 NOTE — LETTER
7/14/2022    Patient: Bernardo Mejia   YOB: 1945   Date of Visit: 7/14/2022     Shailesh Brooke, [address]  [address]  [facility] [address]  [place]    Dear Shailesh Brooke MD,      Thank you for referring Mr. Luz Bonilla Sr to [address] [place] [address] for evaluation. My notes for this consultation are attached. If you have questions, please do not hesitate to call me. I look forward to following your patient along with you.       Sincerely,    Nick Chacon NP

## 2022-07-14 NOTE — PROGRESS NOTES
[facility] in [place],  [address] Visit      Patient name: Rhona Escobar  Patient : 1945  Patient MRN: [id number]  Date of service: 22    Chief Complaint   Patient presents with    Follow-up     LVAD       PLAN OF CARE:  · 69 y.o. male with severe ischemic cardiomyopathy, LVEF 15-20% s/p HM3 LVAD implant as DT on 22 by Dr. Everton Soto c/b intraoperative bleeding requiring multiple blood products and subsequent RV failure with severe TR requiring prolonged dobutamine wean and long term CT support   · S/p left thoracentesis 22 by IR; 1.4L removed    RECOMMENDATIONS:  Continue speed 4800rpms, low speed 4400  No BB due to RV failure   Continue Entresto 24/26mg BID  Cont Eplerenone 25mg daily  Bumex 2 mg PO daily  Increase Farxiga to 10mg daily- monitor volume status   Continue hydralazine 10mg TID  Continue warfarin, goal INR 2-3 see tracker for details   ASA 81mg daily  Continue Flomax 0.4mg daily  DC carafate after this bottle   Cont pepcid   labs   Driveline dressing changes 3 times a week  CPAP qHS   Advanced care plan forms on file   Will have CT chest as follow up on lung nodule for heme/onc- 8/3/22  [facility] and send referral to cardiac rehab       Follow-up and Dispositions    · Return in about 4 weeks (around 2022). IMPRESSION:  S/p LVAD implant as DT  · Post-op RV failure requiring prolonged use of dobutamine  Chronic systolic heart failure  · Stage D, NYHA class IV symptoms  · Non-ischemic cardiomyopathy, LVEF 20% with LVEDD 6.2  · RV dysfunction  At risk of sudden cardiac death  · Recent cardiac arrest   · S/p ICD (2013, [employer] followed by [facility]);  New implant on 10/21/2021 [employer]  Coronary artery disease  · S/p multiple interventions  · S/p 4V CABG (2012)  · LHC (2019) severe stenosis of LIMA to LAD anastomosis site, SVG graft to OM is occluded, SVG graft to RCA 40-50%, LAD occluded proximally, severe proximal LCx, RCA is the long . · PET (6/2019) EF 24% with anterior lateral and inferior reversible defect  Cardiac risk factors  · HTN  · HL  · DM2  · SRUTHI on CPAP  · MIld carotid stenosis  Anemia, microcytic  · Iron deficiency  DVT with filter  Pulmonary nodules   Dysphagia       CARDIAC IMAGING AND DIAGNOSTICS REVIEWED:  Echo (6/28/22): LVEF 25 - 30%. Mildly increased wall thickness. Severe global hypokinesis present    Echo (6/2/22) LVEF 20-25%, mild MR, mod to severe TR, LVIDd 4.9cm, RVIDd 6cm, TAPSE 0.9cm     Echo (5/4/22) LVEF 15-20%, mod TR, LVIDd 5.3 cm, TAPSE 0.9 cm     Echo (4/26/22) - LVIDd 4.9 cm, TAPSE 0.5 cm,     Echo (4/18/22)- LVEF 15-20%, severe TR, TAPSE 0.5cm, RVIDd 6cm     Echo (4/8/22): LVEF 10-15%. Severe global hypokinesis present. 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 Echo (3/2/22): LVEF 10-15%; Mod-severe MR      Echo (11/23/21): LVEF 15-20%; Mod-severe, MR, mod-TR     Echo (5/20/21): LVEF is 20 - 25%. Severely and globally reduced systolic function. Severe (grade 4) left ventricular diastolic dysfunction. EKG (5/20/21) SB with 1degree AV block, QRS 116ms     LHC (5/24/21) Native Coronaries: LM - moderate to severe distal disease 50%. % prox occluded (), heavily calcified, LCx: 80% proximal stenosis. OM1 is  (seen filling faintly via collaterals). OM2 99% stenosis. RCA: 100% proximal occluded. LIMA to LAD: patent, supplies only a diagonal branch (probably D2). The LAD distal to the bifurcation is 100% occluded () and fills via right to left collaterals off the SVG to RCA. SVG to R-PDA: patent with moderate diffuse irregularities but no obstructive disease in the graft. No appealing interventional targets.        [address] Marlen Reilly Report 5/4/2022 5/2/2022 11/12/2021 7/6/2021 5/20/2021   (PRE) HR 82 82 88 98 74   (PRE) O2 Sat - 98 100 - 99   (POST) HR 91 88 102 - 81   (POST) O2 Sat - 98 99 98% on Ra 99   Distance in Meters 191.41 181.36 386. 58 - 1158.24     CXR Results  (Last 48 hours)               06/02/22 1108  XR CHEST PA LAT Final result    Impression:      Increased left-sided pleural effusion and atelectasis. Narrative:  Clinical history: Hx of effusion, s/p LVAD   INDICATION:   Hx of effusion, s/p LVAD   COMPARISON: 5/10/2020       FINDINGS:    PA and lateral views of the chest are obtained. The cardiopericardial silhouette is stable in appearance status post anatomy. Cardiac pacer and left ventricular assist device redemonstrated. Increased   left-sided pleural effusion and atelectasis. XR Results (most recent):  Results from Hospital Encounter encounter on 06/30/22    XR CHEST PA LAT    Narrative  Exam:  2 view chest    Indication: Pleural effusion. COMPARISON: 6/9/2022    PA and lateral views demonstrate normal heart size. Patient status post median  sternotomy. LVAD and ICD are in place. There is a small left pleural effusion. This has decreased in the interval.  Linear scarlike density projects in the left mid zone unchanged dating back to  5/10/2022. No right pleural effusion. Lungs demonstrate no pneumonia or pulmonary edema. Impression  1. The left pleural effusion is small and has decreased in the interval.        BRIEF HISTORY OF PRESENT ILLNESS:  Israel Varela is a 69 y.o. male with h/o HTN, HL, DM2, SRUTHI on CPAP, chronic systolic heart failure, stage D, NYHA class IV symptoms, non-ischemic cardiomyopathy, LVEF 20% with LVEDD 6.2, RV dysfunciton, TAPSE 1.22, TBili 1.5 likely from cardiac congestion, recent cardiac arrest s/p ICD (1/2013, [address] followed by [facility]), coronary artery disease s/p multiple interventions s/p 4V CABG (8/2012), LHC (7/2019) severe stenosis of LIMA to LAD anastomosis site, SVG graft to OM is occluded, SVG graft to RCA 40-50%, LAD occluded proximally, severe proximal LCx, RCA is the long . PET (6/2019) EF 24% with anterior lateral and inferior reversible defect. Anemia, microcytic with iron deficiency, DVT with filter. Patient was referred to [facility] by Dr. Christine Patino in 2021 for evaluation of his candidacy for advanced therapies. Patient agreed to inpatient initiation of palliative infusion of chronic inotropes and evaluation for LVAD-DT. Patient was admitted 5/2-5/25/21. Patient was started on milrinone infusion and had first part of LVAD evaluation completed. Right and left heart cath on 5/24/21 showed severe diffuse native vessel CAD, with patent grafts (LIMA to D2, SVG to RCA). Pt was found to have pulmonary nodule during workup. Antiplatelet therapy was held during hospitalization and after discharge due to anticipated pulmonary nodule biopsy, bone marrow biopsy and EGD/C-Scope as part of LVAD workup. Pt found to have adenocarcinoma of the lung, and so LVAD was deferred pending treatment. Patient completed radiation treatment for adenocarcinoma of the lung. Hem-onc cleared patient for VAD implant with 90% 2 year prognosis. He presented to [facility] on 4/3 for admission for planned LVAD implant which he underwent on 4/5/22. Was re-do sternotomy, bleeding intra-op, required cryo, k-centra, FFP, Plt, and cellsaver in OR. Had RV dysfunction noted intra-op; requiring lower VAD speed and dual inotrope support. 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 He also had issues with significant CT drainage requiring prolonged placement. He was eventually discharged home on POD 29 with GDMT (no BB due to RV failure) with [facility] SN/PT and OT. He had persistent left pleural effusion and underwent thoracentesis in IR on 6/9/22 with 1.4L removed. INTERVAL HISTORY:   Pt presents for LVAD follow up today. He reports feeling well. His SOB has been relatively unchanged but he has been able to increase his activity without increased BRAND. His weights are stable, labs at baseline.  He started showering at last visit and drive line exit site is intact. He is compliant with his medications and will DC from [facility] and start cardiac rehab. REVIEW OF SYSTEMS:  Review of Systems   Constitutional: Negative for chills, fever, malaise/fatigue and weight loss. Respiratory: Negative for cough and shortness of breath. +BRAND moderate exertion especially with stairs   Cardiovascular: Negative for chest pain, palpitations, orthopnea and leg swelling. Gastrointestinal: Negative for abdominal pain, constipation, diarrhea, heartburn, nausea and vomiting. Musculoskeletal: Negative for falls. Neurological: Negative for dizziness and weakness. Psychiatric/Behavioral: Negative for depression. PHYSICAL EXAM:  Visit Vitals  BP (!) 86/0   Temp 97.5 °F (36.4 °C) (Oral)   Resp 18   Ht 6' 1\" (1.854 m)   Wt 184 lb (83.5 kg)   SpO2 99%   BMI 24.28 kg/m²       LVAD INTERROGATION:  Device interrogated in person  Device function normal, normal flow, no events  LVAD   Pump Speed (RPM): 4800  Pump Flow (LPM): 3.5  PI (Pulsitility Index): 8.5  Power: 3.2  Back Up  at Bedside & Labeled: Yes  Testing  Alarms Reviewed: Yes  Back up SC speed: 4800  Back up Low Speed Limit: 4400  Emergency Equipment with Patient?: Yes  Emergency procedures reviewed?: Yes      Physical Exam  Vitals reviewed. Constitutional:       General: He is not in acute distress. Appearance: Normal appearance. Cardiovascular:      Rate and Rhythm: Normal rate and regular rhythm. Heart sounds: Normal heart sounds. Comments: LVAD Hum noted on auscultation  Pulmonary:      Effort: Pulmonary effort is normal. No respiratory distress. Abdominal:      General: Bowel sounds are normal. There is no distension. Palpations: Abdomen is soft. Tenderness: There is no abdominal tenderness. Musculoskeletal:      Right lower leg: No edema. Left lower leg: No edema. Skin:     General: Skin is warm and dry. Capillary Refill: Capillary refill takes less than 2 seconds. Neurological:      General: No focal deficit present. Mental Status: He is alert and oriented to person, place, and time. Psychiatric:         Mood and Affect: Mood normal.         Behavior: Behavior normal.         Thought Content: Thought content normal.         Judgment: Judgment normal.            PAST MEDICAL HISTORY:  Past Medical History:   Diagnosis Date    CAD (coronary artery disease) '90  '97, '13 x 2    MI, code [place] in 2013 at [facility] [place]    Calculus of kidney     Colonic polyps     Congestive heart failure, unspecified     Diabetes ([address]     Gastritis     Hypercholesterolemia     Sleep apnea        PAST SURGICAL HISTORY:  Past Surgical History:   Procedure Laterality Date    COLONOSCOPY  04/06/2011    16, due 21    COLONOSCOPY N/A 3/2/2022    COLONOSCOPY    :- performed by Michelle Fenton [facility] at [facility] ENDOSCOPY    ENDOSCOPY, COLON, DIAGNOSTIC      11, due 16    HX CORONARY ARTERY BYPASS GRAFT  8/22/12    x 4 vessel by MISSY LION    HX PACEMAKER PLACEMENT  1/30/13    [place] CARDIAC SURG PROCEDURE UNLIST  2012    x 4 vessels       FAMILY HISTORY:  Family History   Problem Relation Age of Onset    Heart Disease Mother         MI    Heart Disease Father         CAD & PVD    Lung Disease Father     Cancer Father         lung    Diabetes Maternal Grandmother     Heart Disease Other         CAD    Stroke Sister        SOCIAL HISTORY:  Social History     Socioeconomic History    Marital status:    Tobacco Use    Smoking status: Never Smoker    Smokeless tobacco: Never Used   Vaping Use    Vaping Use: Never used   Substance and Sexual Activity    Alcohol use:  Yes     Alcohol/week: 0.0 standard drinks     Comment:  VERY RARE    Drug use: No       LABORATORY RESULTS:     Labs Latest Ref Rng & Units 6/30/2022 6/27/2022   WBC 3.4 - 10.8 x10E3/uL 3.9 -   RBC 4.14 - 5.80 x10E6/uL 4.39 -   Hemoglobin 13.0 - 17.7 g/dL 10. 3(L) -   Hematocrit 37.5 - 51.0 % 35. 1(L) -   MCV 79 - 97 fL 80 -   Platelets 576 - 041 G48G7/ -   Albumin 3.7 - 4.7 g/dL 3.5(L) 3. 1(L)   Calcium 8.6 - 10.2 mg/dL 8.8 8.7   Glucose 65 - 99 mg/dL 110(H) 137(H)   BUN 8 - 27 mg/dL 24 32(H)   Creatinine 0.76 - 1.27 mg/dL 1.00 1.20   Sodium 134 - 144 mmol/L 135 137   Potassium 3.5 - 5.2 mmol/L 5.1 4.4    - 224 IU/L 223 -   Some recent data might be hidden     Lab Results   Component Value Date/Time    TSH 2.26 04/10/2022 03:00 AM    TSH 1.68 03/01/2022 11:50 AM    TSH 1.47 05/26/2021 10:44 PM    TSH 1.97 05/20/2021 04:28 PM    TSH 1.41 02/09/2021 03:05 PM    TSH 1.740 08/14/2018 09:58 AM    TSH 1.710 10/18/2017 12:00 AM       ALLERGY:  Allergies   Allergen Reactions    Oxycodone Anaphylaxis    Pcn [Penicillins] Hives        CURRENT MEDICATIONS:    Current Outpatient Medications:     dapagliflozin (FARXIGA) 10 mg tab tablet, Take 1 Tablet by mouth daily (with lunch). , Disp: 30 Tablet, Rfl: 2    docusate sodium (COLACE) 100 mg capsule, Take 1 Capsule by mouth daily for 90 days. , Disp: 90 Capsule, Rfl: 0    magnesium oxide (MAG-OX) 400 mg tablet, Take 1 Tablet by mouth daily. , Disp: 90 Tablet, Rfl: 0    mirtazapine (REMERON) 7.5 mg tablet, Take 1 Tablet by mouth nightly., Disp: 90 Tablet, Rfl: 0    eplerenone (INSPRA) 25 mg tablet, Take 1 Tablet by mouth daily. , Disp: 90 Tablet, Rfl: 1    hydrALAZINE (APRESOLINE) 10 mg tablet, Take 1 Tablet by mouth three (3) times daily. , Disp: 90 Tablet, Rfl: 0    bumetanide (BUMEX) 2 mg tablet, Take 1 Tablet by mouth daily. Take 1 tablet by mouth daily (Patient taking differently: Take 2 mg by mouth daily.), Disp: 90 Tablet, Rfl: 1    sacubitriL-valsartan (Entresto) 24-26 mg tablet, Take 1 Tablet by mouth two (2) times a day., Disp: 60 Tablet, Rfl: 1    nmnbqzlij-ZR-gaponbcp-guaifen (Mucinex Fast-Max Cold-Flu) 10- mg/20 mL liqd, Take 20 mL by mouth every four (4) hours as needed for Cough.  Take 20 mL by mouth every 4 hours as needed for cough, Disp: , Rfl:     tamsulosin (FLOMAX) 0.4 mg capsule, Take 1 Capsule by mouth daily. , Disp: 30 Capsule, Rfl: 2    famotidine (PEPCID) 20 mg tablet, Take 1 Tablet by mouth every twelve (12) hours. , Disp: 60 Tablet, Rfl: 2    sucralfate (CARAFATE) 1 gram tablet, Take 1 Tablet by mouth Before breakfast, lunch, dinner and at bedtime. , Disp: 120 Tablet, Rfl: 2    warfarin (COUMADIN) 2.5 mg tablet, Take 2 Tablets by mouth daily. , Disp: 90 Tablet, Rfl: 2    potassium chloride SR (K-TAB) 20 mEq tablet, Take 2 Tablets by mouth two (2) times a day., Disp: 360 Tablet, Rfl: 1    metFORMIN (GLUCOPHAGE) 500 mg tablet, Take 1 Tablet by mouth two (2) times daily (with meals). , Disp: 180 Tablet, Rfl: 3    acetaminophen (TYLENOL EXTRA STRENGTH) 500 mg tablet, Take 500 mg by mouth every six (6) hours as needed for Fever or Pain., Disp: , Rfl:     aspirin delayed-release 81 mg tablet, Take 81 mg by mouth daily. , Disp: , Rfl:     PATIENT CARE TEAM:  Patient Care Team:  Lexis Peacock MD as PCP - General (Internal Medicine Physician)  Lexis Peacock MD as PCP - [facility] Provider  Tenisha Willis MD as Physician (Cardiovascular Disease Physician)  Unique Allen MD as Physician (Gastroenterology)  Chuck Hickman MD as Physician (Orthopedic Surgery)  Harleen Horta MD as Physician (Ophthalmology)  Dipak Nguyen MD (Cardiovascular Disease Physician)  Latanya Thompson MD (Cardiovascular Disease Physician)     Thank you for allowing us to participate in this patient's care.     Manuel Santos NP  [address] [place] [address], [address]  Phone: [phone]

## 2022-07-15 ENCOUNTER — TELEPHONE ANTICOAG (OUTPATIENT)
Dept: CARDIOLOGY CLINIC | Age: 77
End: 2022-07-15

## 2022-07-15 ENCOUNTER — HOME CARE VISIT (OUTPATIENT)
Dept: SCHEDULING | Facility: HOME HEALTH | Age: 77
End: 2022-07-15
Payer: MEDICARE

## 2022-07-15 LAB — INR, EXTERNAL: 2.1

## 2022-07-15 PROCEDURE — G0495 RN CARE TRAIN/EDU IN HH: HCPCS

## 2022-07-15 RX ORDER — HYDRALAZINE HYDROCHLORIDE 10 MG/1
10 TABLET, FILM COATED ORAL 3 TIMES DAILY
Qty: 90 TABLET | Refills: 0 | Status: SHIPPED | OUTPATIENT
Start: 2022-07-15 | End: 2022-07-18 | Stop reason: SDUPTHER

## 2022-07-15 NOTE — TELEPHONE ENCOUNTER
Requested Prescriptions     Signed Prescriptions Disp Refills    hydrALAZINE (APRESOLINE) 10 mg tablet 90 Tablet 0     Sig: Take 1 Tablet by mouth three (3) times daily.      Authorizing Provider: Shankar Diaz     Ordering User: Orrie Spurling

## 2022-07-15 NOTE — CARDIO/PULMONARY
Shari Nuñez Sr  69 y.o. With a diagnosis of HF, Shari Savannah Carvajal attended Phase II Cardiac Rehab for 29 sessions from 7/6/21 - 1/31/22. He has had several health challenges that have post-poned his cardiac rehab participation and unfortunately he is unable to return.   He has been discharged from our program.      Satnam Carranza RN  7/15/2022

## 2022-07-15 NOTE — PROGRESS NOTES
Met with patient during clinic visit. LVAD interrogation completed. Several PI events noted, no other adverse alarms or events noted. Per EARNESTINE AdrianO.RYANIRA patient to start Farxiga 10mg daily and may stop carafate once current supply completed. Orders placed. Educated patient to call with any dizziness, lightheadedness or UTI symptoms after starting [place]. Per EARNESTINE AdrianOSanaRYANIRA patient to be discharged from home health and cardiac rehab orders to be placed. Informed patient he will be contacted by cardiac rehab for scheduling. All instructions placed in after visit summary. AVS reviewed with patient and wife. Time given to ask questions. Patient and wife had no further questions. Lazara Herron RN with [facility] notified of discharge orders. She verbalized understanding and had no further questions. Orders to cardiac rehab faxed.      Salvador Chacon RN  VAD Coordinator

## 2022-07-18 ENCOUNTER — HOME CARE VISIT (OUTPATIENT)
Dept: SCHEDULING | Facility: HOME HEALTH | Age: 77
End: 2022-07-18
Payer: MEDICARE

## 2022-07-18 VITALS — BODY MASS INDEX: 23.88 KG/M2 | TEMPERATURE: 98 F | OXYGEN SATURATION: 97 % | WEIGHT: 181 LBS | RESPIRATION RATE: 20 BRPM

## 2022-07-18 PROCEDURE — G0299 HHS/HOSPICE OF RN EA 15 MIN: HCPCS

## 2022-07-18 RX ORDER — HYDRALAZINE HYDROCHLORIDE 10 MG/1
10 TABLET, FILM COATED ORAL 3 TIMES DAILY
Qty: 290 TABLET | Refills: 3 | Status: SHIPPED | OUTPATIENT
Start: 2022-07-18

## 2022-07-19 VITALS
OXYGEN SATURATION: 98 % | BODY MASS INDEX: 23.75 KG/M2 | WEIGHT: 180 LBS | RESPIRATION RATE: 20 BRPM | TEMPERATURE: 98.2 F

## 2022-07-21 ENCOUNTER — TELEPHONE ANTICOAG (OUTPATIENT)
Dept: CARDIOLOGY CLINIC | Age: 77
End: 2022-07-21

## 2022-07-21 ENCOUNTER — HOME CARE VISIT (OUTPATIENT)
Dept: SCHEDULING | Facility: HOME HEALTH | Age: 77
End: 2022-07-21
Payer: MEDICARE

## 2022-07-21 LAB — INR, EXTERNAL: 2.2

## 2022-07-21 PROCEDURE — G0299 HHS/HOSPICE OF RN EA 15 MIN: HCPCS

## 2022-07-22 VITALS
TEMPERATURE: 98.2 F | OXYGEN SATURATION: 98 % | WEIGHT: 181 LBS | BODY MASS INDEX: 23.88 KG/M2 | RESPIRATION RATE: 20 BRPM

## 2022-07-25 ENCOUNTER — TELEPHONE (OUTPATIENT)
Dept: CARDIOLOGY CLINIC | Age: 77
End: 2022-07-25

## 2022-07-25 ENCOUNTER — HOME CARE VISIT (OUTPATIENT)
Dept: SCHEDULING | Facility: HOME HEALTH | Age: 77
End: 2022-07-25
Payer: MEDICARE

## 2022-07-25 PROCEDURE — G0299 HHS/HOSPICE OF RN EA 15 MIN: HCPCS

## 2022-07-25 RX ORDER — SACUBITRIL AND VALSARTAN 24; 26 MG/1; MG/1
1 TABLET, FILM COATED ORAL 2 TIMES DAILY
Qty: 14 TABLET | Refills: 0 | Status: SHIPPED | OUTPATIENT
Start: 2022-07-25 | End: 2022-08-01

## 2022-07-25 NOTE — TELEPHONE ENCOUNTER
Returned patient's call. Patient is waiting on [facility] shipment of Toni Quinn which is delayed. Patient's requested a 7 days prescription of Entresto. Spoke with Josephine Gómez NP. Prescription sent.

## 2022-07-27 VITALS
WEIGHT: 182 LBS | OXYGEN SATURATION: 98 % | BODY MASS INDEX: 24.01 KG/M2 | RESPIRATION RATE: 20 BRPM | TEMPERATURE: 97.2 F

## 2022-07-28 ENCOUNTER — HOME CARE VISIT (OUTPATIENT)
Dept: SCHEDULING | Facility: HOME HEALTH | Age: 77
End: 2022-07-28
Payer: MEDICARE

## 2022-07-28 PROCEDURE — G0299 HHS/HOSPICE OF RN EA 15 MIN: HCPCS

## 2022-07-29 VITALS
WEIGHT: 183 LBS | TEMPERATURE: 97.2 F | OXYGEN SATURATION: 98 % | BODY MASS INDEX: 24.14 KG/M2 | RESPIRATION RATE: 20 BRPM

## 2022-08-01 ENCOUNTER — HOME CARE VISIT (OUTPATIENT)
Dept: SCHEDULING | Facility: HOME HEALTH | Age: 77
End: 2022-08-01
Payer: MEDICARE

## 2022-08-01 DIAGNOSIS — D50.9 IRON DEFICIENCY ANEMIA, UNSPECIFIED: ICD-10-CM

## 2022-08-01 DIAGNOSIS — C34.12 PRIMARY MALIGNANT NEOPLASM OF BRONCHUS OF LEFT UPPER LOBE (HCC): ICD-10-CM

## 2022-08-01 PROCEDURE — G0299 HHS/HOSPICE OF RN EA 15 MIN: HCPCS

## 2022-08-02 ENCOUNTER — OFFICE VISIT (OUTPATIENT)
Dept: INTERNAL MEDICINE CLINIC | Age: 77
End: 2022-08-02
Payer: MEDICARE

## 2022-08-02 VITALS
RESPIRATION RATE: 16 BRPM | SYSTOLIC BLOOD PRESSURE: 91 MMHG | HEART RATE: 44 BPM | HEIGHT: 73 IN | DIASTOLIC BLOOD PRESSURE: 62 MMHG | TEMPERATURE: 97.8 F | WEIGHT: 195.4 LBS | OXYGEN SATURATION: 100 % | BODY MASS INDEX: 25.9 KG/M2

## 2022-08-02 DIAGNOSIS — Z00.00 MEDICARE ANNUAL WELLNESS VISIT, SUBSEQUENT: Primary | ICD-10-CM

## 2022-08-02 DIAGNOSIS — Z79.899 ENCOUNTER FOR LONG-TERM (CURRENT) USE OF OTHER MEDICATIONS: ICD-10-CM

## 2022-08-02 DIAGNOSIS — E11.51 TYPE 2 DIABETES, CONTROLLED, WITH PERIPHERAL CIRCULATORY DISORDER (HCC): ICD-10-CM

## 2022-08-02 DIAGNOSIS — Z12.5 SCREENING FOR PROSTATE CANCER: ICD-10-CM

## 2022-08-02 DIAGNOSIS — E78.2 MIXED HYPERLIPIDEMIA: ICD-10-CM

## 2022-08-02 PROCEDURE — G8427 DOCREV CUR MEDS BY ELIG CLIN: HCPCS | Performed by: INTERNAL MEDICINE

## 2022-08-02 PROCEDURE — G8417 CALC BMI ABV UP PARAM F/U: HCPCS | Performed by: INTERNAL MEDICINE

## 2022-08-02 PROCEDURE — G8510 SCR DEP NEG, NO PLAN REQD: HCPCS | Performed by: INTERNAL MEDICINE

## 2022-08-02 PROCEDURE — 1101F PT FALLS ASSESS-DOCD LE1/YR: CPT | Performed by: INTERNAL MEDICINE

## 2022-08-02 PROCEDURE — G8536 NO DOC ELDER MAL SCRN: HCPCS | Performed by: INTERNAL MEDICINE

## 2022-08-02 PROCEDURE — G0439 PPPS, SUBSEQ VISIT: HCPCS | Performed by: INTERNAL MEDICINE

## 2022-08-02 RX ORDER — LANOLIN ALCOHOL/MO/W.PET/CERES
400 CREAM (GRAM) TOPICAL DAILY
Qty: 30 TABLET | Refills: 3 | Status: SHIPPED | OUTPATIENT
Start: 2022-08-02

## 2022-08-02 RX ORDER — FAMOTIDINE 20 MG/1
TABLET, FILM COATED ORAL
Qty: 60 TABLET | Refills: 2 | Status: SHIPPED | OUTPATIENT
Start: 2022-08-02 | End: 2022-08-02

## 2022-08-02 RX ORDER — FAMOTIDINE 20 MG/1
20 TABLET, FILM COATED ORAL EVERY 12 HOURS
Qty: 60 TABLET | Refills: 2 | Status: SHIPPED | OUTPATIENT
Start: 2022-08-02 | End: 2022-10-28

## 2022-08-02 RX ORDER — SACUBITRIL AND VALSARTAN 24; 26 MG/1; MG/1
TABLET, FILM COATED ORAL
Qty: 14 TABLET | Refills: 0 | OUTPATIENT
Start: 2022-08-02

## 2022-08-02 NOTE — PROGRESS NOTES
HISTORY OF PRESENT ILLNESS  Tho Agrawal is a 69 y.o. male. Complete Physical  Associated symptoms include shortness of breath. Pertinent negatives include no chest pain. Assessment:  Anna Major is seen today accompanied by his wife, Viktoria Millan, for a Medicare wellness visit and follow up of chronic problems. 1. Preventive care. See attached wellness visit note. He is due for PSA measurement and shingles vaccine. He is up to date with other labs, primarily obtained from his cardiologist, other vaccinations, including three COVID vaccines. 2. Chronic problems are reviewed. He has CHF with an LVAD. He is up to date with cardiology follow up and this is close. Lung cancer is followed by specialists and a repeat CT scan is pending. Lastly, lab measures for diabetes and cholesterol are up to date. Would plan on him following up with a new PCP in three months. Review of Systems   Constitutional:  Negative for chills and fever. HENT:  Positive for hearing loss. Respiratory:  Positive for shortness of breath. Cardiovascular:  Negative for chest pain. Gastrointestinal: Negative. Genitourinary: Negative. Physical Exam  Vitals and nursing note reviewed. Constitutional:       General: He is not in acute distress. HENT:      Ears:      Comments: Global hum of LVAD  Neck:      Vascular: No carotid bruit. Cardiovascular:      Rate and Rhythm: Normal rate and regular rhythm. Pulmonary:      Effort: Pulmonary effort is normal. No respiratory distress. Breath sounds: Normal breath sounds. Musculoskeletal:      Right lower leg: No edema. Left lower leg: No edema. ASSESSMENT and PLAN  Diagnoses and all orders for this visit:    1. Medicare annual wellness visit, subsequent    2. Encounter for long-term (current) use of other medications    3. Mixed hyperlipidemia    4. Type 2 diabetes, controlled, with peripheral circulatory disorder ([facility] [address]    5.  Screening for prostate cancer  -     PSA SCREENING (SCREENING);  Future

## 2022-08-02 NOTE — PATIENT INSTRUCTIONS
Medicare Wellness Visit, Male    The best way to live healthy is to have a lifestyle where you eat a well-balanced diet, exercise regularly, limit alcohol use, and quit all forms of tobacco/nicotine, if applicable. Regular preventive services are another way to keep healthy. Preventive services (vaccines, screening tests, monitoring & exams) can help personalize your care plan, which helps you manage your own care. Screening tests can find health problems at the earliest stages, when they are easiest to treat. [facility] follows the current, evidence-based guidelines published by the [facility] ([facility]) when recommending preventive services for our patients. Because we follow these guidelines, sometimes recommendations change over time as research supports it. (For example, a prostate screening blood test is no longer routinely recommended for men with no symptoms). Of course, you and your doctor may decide to screen more often for some diseases, based on your risk and co-morbidities (chronic disease you are already diagnosed with). Preventive services for you include:  - Medicare offers their members a free annual wellness visit, which is time for you and your primary care provider to discuss and plan for your preventive service needs. Take advantage of this benefit every year!  -All adults over age 72 should receive the recommended pneumonia vaccines. Current USPSTF guidelines recommend a series of two vaccines for the best pneumonia protection.   -All adults should have a flu vaccine yearly and tetanus vaccine every 10 years.  -All adults age 48 and older should receive the shingles vaccines (series of two vaccines).        -All adults age 38-68 who are overweight should have a diabetes screening test once every three years.   -Other screening tests & preventive services for persons with diabetes include: an eye exam to screen for diabetic retinopathy, a kidney function test, a foot exam, and stricter control over your cholesterol.   -Cardiovascular screening for adults with routine risk involves an electrocardiogram (ECG) at intervals determined by the provider.   -Colorectal cancer screening should be done for adults age 54-65 with no increased risk factors for colorectal cancer. There are a number of acceptable methods of screening for this type of cancer. Each test has its own benefits and drawbacks. Discuss with your provider what is most appropriate for you during your annual wellness visit. The different tests include: colonoscopy (considered the best screening method), a fecal occult blood test, a fecal DNA test, and sigmoidoscopy.  -All adults born between [facility] should be screened once for Hepatitis C.  -An Abdominal Aortic Aneurysm (AAA) Screening is recommended for men age 73-68 who has ever smoked in their lifetime.      Here is a list of your current Health Maintenance items (your personalized list of preventive services) with a due date:  Health Maintenance Due   Topic Date Due    Shingles Vaccine (1 of 2) Never done    Eye Exam  03/06/2019

## 2022-08-02 NOTE — PROGRESS NOTES
This is the Subsequent Medicare Annual Wellness Exam, performed 12 months or more after the Initial AWV or the last Subsequent AWV    I have reviewed the patient's medical history in detail and updated the computerized patient record. Assessment/Plan   Education and counseling provided:  Are appropriate based on today's review and evaluation    1. Encounter for long-term (current) use of other medications  2. Mixed hyperlipidemia  3. Type 2 diabetes, controlled, with peripheral circulatory disorder ([facility] [address]  4. Screening for prostate cancer  5. Medicare annual wellness visit, subsequent       Depression Risk Factor Screening     3 most recent PHQ Screens 8/2/2022   Little interest or pleasure in doing things Not at all   Feeling down, depressed, irritable, or hopeless Not at all   Total Score PHQ 2 0   Trouble falling or staying asleep, or sleeping too much -   Feeling tired or having little energy -   Poor appetite, weight loss, or overeating -   Feeling bad about yourself - or that you are a failure or have let yourself or your family down -   Trouble concentrating on things such as school, work, reading, or watching TV -   Moving or speaking so slowly that other people could have noticed; or the opposite being so fidgety that others notice -   Thoughts of being better off dead, or hurting yourself in some way -   PHQ 9 Score -   How difficult have these problems made it for you to do your work, take care of your home and get along with others -       Alcohol & Drug Abuse Risk Screen    Do you average more than 1 drink per night or more than 7 drinks a week: No- 0    In the past three months have you have had more than 4 drinks containing alcohol on one occasion: No          Functional Ability and Level of Safety    Hearing: The patient needs further evaluation. Activities of Daily Living: The home contains: no safety equipment.   Patient does total self care      Ambulation: with difficulty, can't walk further than one block blocks     Fall Risk:  Fall Risk Assessment, last 12 mths 8/2/2022   Able to walk? Yes   Fall in past 12 months? 0   Do you feel unsteady? 0   Are you worried about falling 0   Is the gait abnormal? -   Number of falls in past 12 months -   Fall with injury?  -      Abuse Screen:  Patient is not abused       Cognitive Screening    Has your family/caregiver stated any concerns about your memory: no         Health Maintenance Due     Health Maintenance Due   Topic Date Due    Shingrix Vaccine Age 50> (1 of 2) Never done    Eye Exam Retinal or Dilated  03/06/2019       Patient Care Team   Patient Care Team:  Marichuy Reed MD as PCP - General (Internal Medicine Physician)  Marichuy Reed MD as PCP - [facility]  Galileo Hernandez MD as Physician (Cardiovascular Disease Physician)  Minh Jang MD as Physician (Gastroenterology)  Sergio Song MD as Physician (Orthopedic Surgery)  Ping Kidd MD as Physician (Ophthalmology)  Salvador Lopez MD (Cardiovascular Disease Physician)  Behzad Marion MD (Cardiovascular Disease Physician)    History     Patient Active Problem List   Diagnosis Code    Calculus of kidney N20.0    Coronary atherosclerosis of native coronary artery I25.10    Benign neoplasm of colon D12.6    Unspecified sleep apnea G47.30    Reflux esophagitis K21.00    Encounter for long-term (current) use of other medications Z79.899    Insomnia, unspecified G47.00    Cardiac arrest ([facility] [address] I46.9    Hematuria, gross R31.0    BPH without obstruction/lower urinary tract symptoms N40.0    Proteinuria R80.9    CHF, stage C ([address] I50.9    Mixed hyperlipidemia E78.2    Type 2 diabetes, controlled, with peripheral circulatory disorder (HCC) E11.51    Active advance directive on file Z78.9    Acute on chronic clinical systolic heart failure ([address] I50.23    NSTEMI (non-ST elevated myocardial infarction) ([address] I21.4    HFrEF (heart failure with reduced ejection fraction) ([facility]) I50.20    S/P ventricular assist device [facility]) Z95.811     Past Medical History:   Diagnosis Date    CAD (coronary artery disease) '90 '97, '13 x 2    MI, code ice in 2013 at [facility]    Calculus of kidney     Colonic polyps     Congestive heart failure, unspecified     Diabetes ([facility] [address]     Gastritis     Hypercholesterolemia     Sleep apnea       Past Surgical History:   Procedure Laterality Date    COLONOSCOPY  04/06/2011    16, due 21    COLONOSCOPY N/A 3/2/2022    COLONOSCOPY    :- performed by Dutch Castro MD at [facility] END[facility]COPY    ENDOSCOPY, COLON, DIAGNOSTIC      11, due 16    HX CORONARY ARTERY BYPASS GRAFT  8/22/12    x 4 vessel by MISSY Ramirez    HX HEENT      LASIK    HX PACEMAKER PLACEMENT  1/30/13    SC CARDIAC SURG PROCEDURE UNLIST  2012    x 4 vessels     Current Outpatient Medications   Medication Sig Dispense Refill    magnesium oxide (MAG-OX) 400 mg tablet TAKE 1 TABLET BY MOUTH DAILY 30 Tablet 3    hydrALAZINE (APRESOLINE) 10 mg tablet Take 1 Tablet by mouth three (3) times daily. 290 Tablet 3    dapagliflozin (FARXIGA) 10 mg tab tablet Take 1 Tablet by mouth daily (with lunch). 90 Tablet 3    docusate sodium (COLACE) 100 mg capsule Take 1 Capsule by mouth daily for 90 days. 90 Capsule 0    mirtazapine (REMERON) 7.5 mg tablet Take 1 Tablet by mouth nightly. 90 Tablet 0    eplerenone (INSPRA) 25 mg tablet Take 1 Tablet by mouth daily. 90 Tablet 1    bumetanide (BUMEX) 2 mg tablet Take 1 Tablet by mouth daily. Take 1 tablet by mouth daily (Patient taking differently: Take 2 mg by mouth in the morning.) 90 Tablet 1    sacubitriL-valsartan (Entresto) 24-26 mg tablet Take 1 Tablet by mouth two (2) times a day. 60 Tablet 1    atssewsru-VO-dtkcklqj-guaifen (Mucinex Fast-Max Cold-Flu) 10- mg/20 mL liqd Take 20 mL by mouth every four (4) hours as needed for Cough. Take 20 mL by mouth every 4 hours as needed for cough      tamsulosin (FLOMAX) 0.4 mg capsule Take 1 Capsule by mouth daily. 30 Capsule 2    warfarin (COUMADIN) 2.5 mg tablet Take 2 Tablets by mouth daily. 90 Tablet 2    potassium chloride SR (K-TAB) 20 mEq tablet Take 2 Tablets by mouth two (2) times a day. 360 Tablet 1    metFORMIN (GLUCOPHAGE) 500 mg tablet Take 1 Tablet by mouth two (2) times daily (with meals). 180 Tablet 3    acetaminophen (TYLENOL) 500 mg tablet Take 500 mg by mouth every six (6) hours as needed for Fever or Pain. aspirin delayed-release 81 mg tablet Take 81 mg by mouth daily. Allergies   Allergen Reactions    Oxycodone Anaphylaxis    Pcn [Penicillins] Hives       Family History   Problem Relation Age of Onset    Heart Disease Mother         MI    Heart Disease Father         CAD & PVD    Lung Disease Father     Cancer Father         lung    Diabetes Maternal Grandmother     Heart Disease Other         CAD    Stroke Sister      Social History     Tobacco Use    Smoking status: Never    Smokeless tobacco: Never   Substance Use Topics    Alcohol use:  Yes     Alcohol/week: 0.0 standard drinks     Comment:  Jazmin Patel MD

## 2022-08-03 ENCOUNTER — HOSPITAL ENCOUNTER (OUTPATIENT)
Dept: CT IMAGING | Age: 77
Discharge: HOME | End: 2022-08-03
Attending: INTERNAL MEDICINE
Payer: MEDICARE

## 2022-08-03 ENCOUNTER — TELEPHONE (OUTPATIENT)
Dept: CARDIOLOGY CLINIC | Age: 77
End: 2022-08-03

## 2022-08-03 LAB — PSA SERPL-MCNC: 2.4 NG/ML (ref 0.01–4)

## 2022-08-03 PROCEDURE — 71260 CT THORAX DX C+: CPT

## 2022-08-03 PROCEDURE — 74011000636 HC RX REV CODE- 636: Performed by: INTERNAL MEDICINE

## 2022-08-03 RX ADMIN — IOPAMIDOL 100 ML: 612 INJECTION, SOLUTION INTRAVENOUS at 09:23

## 2022-08-03 NOTE — TELEPHONE ENCOUNTER
60-year-old female with history of kidney stones presents with complaint of right flank pain, hematuria, and dysuria since last evening. States that right flank pain is sharp and shooting in nature. States the pain is similar to previous kidney stone. Patient denies fever, chills, diarrhea, constipation, chest pain, shortness of breath, vaginal discharge, vaginal bleeding. Denies any alleviating factors. Patient reports associated nausea. Reports dysuria and hematuria with every urination that she has had since last evening. The history is provided by the patient. No  was used. Blood in Urine This is a new problem. The current episode started 12 to 24 hours ago. The problem occurs every urination. The problem has not changed since onset. The quality of the pain is described as burning, shooting and stabbing. The pain is at a severity of 4/10. The pain is mild. There has been no fever. Associated symptoms include nausea, hematuria and flank pain. Pertinent negatives include no chills, no sweats, no vomiting, no discharge, no hesitancy, no urgency, no vaginal discharge, no abdominal pain and no back pain. The patient is not pregnant. She has tried nothing for the symptoms. The treatment provided no relief. Her past medical history is significant for kidney stones. Past Medical History:  
Diagnosis Date  Fibromyalgia   
 on med for control  GERD (gastroesophageal reflux disease) diet controlled  History of IBS  Hypertension   
 controlled with med  Liver disease 2011  
 elevated LFTs  Renal stone Past Surgical History:  
Procedure Laterality Date  HX COLONOSCOPY  02/2018  
 and egd  HX [de-identified]  11yrs old  
 plastic surgery on lip  HX HYSTERECTOMY  HX ORTHOPAEDIC    
 HX TOTAL LAPAROSCOPIC HYSTERECTOMY W/ BS&O  12/10/14 Mary Grace Titus Family History:  
Problem Relation Age of Onset  Cancer Father   
     lung Telephone Call RE:  Lab Reminder      Outcome:     [x] Patient verbalizes understanding    [] Unable to reach   [] Left message              []       Zay Colindres  Hypertension Brother  Emphysema Mother  Cancer Sister   
     brain tumor  Cancer Sister   
     bladder cancer x 2  
 Stroke Sister  Heart Disease Brother  Cancer Brother   
     bladder cancer  Heart Attack Brother  Thyroid Disease Brother Social History Socioeconomic History  Marital status: SINGLE Spouse name: Marleny Muhammad  Number of children: Not on file  Years of education: Not on file  Highest education level: Not on file Social Needs  Financial resource strain: Not on file  Food insecurity - worry: Not on file  Food insecurity - inability: Not on file  Transportation needs - medical: Not on file  Transportation needs - non-medical: Not on file Occupational History  Occupation: Makes carbon brushes Employer: [employer]  
Tobacco Use  Smoking status: Never Smoker  Smokeless tobacco: Never Used Substance and Sexual Activity  Alcohol use: No  
 Drug use: No  
 Sexual activity: Yes  
  Partners: Female Birth control/protection: None Comment: Partner is Marleny Muhammad Other Topics Concern  Not on file Social History Narrative [address]  
 
 
 
ALLERGIES: Chlorhexidine and Codeine Review of Systems Constitutional: Negative for chills and fever. Respiratory: Negative for cough and shortness of breath. Cardiovascular: Negative for chest pain. Gastrointestinal: Positive for nausea. Negative for abdominal pain, blood in stool, constipation, diarrhea and vomiting. Genitourinary: Positive for flank pain and hematuria. Negative for hesitancy, pelvic pain, urgency, vaginal bleeding, vaginal discharge and vaginal pain. Musculoskeletal: Negative for back pain and myalgias. Skin: Negative for rash. Neurological: Negative for dizziness, weakness, light-headedness and headaches. Vitals:  
 11/15/18 0810 BP: (!) 157/92 Pulse: 94 Resp: 16 Temp: 99 °F (37.2 °C) SpO2: 97% Weight: 99.8 kg (220 lb) Height: 5' 5\" (1.651 m) Physical Exam  
Constitutional: She is oriented to person, place, and time. She appears well-developed and well-nourished. Well appearing and in NAD. HENT:  
Head: Normocephalic. MMM. Neck: No JVD present. No tracheal deviation present. Cardiovascular: Normal rate, regular rhythm and normal heart sounds. RRR. Pulses 2+ and equal bilaterally. Pulmonary/Chest: Effort normal and breath sounds normal.  
CTAB. Abdominal: Soft. Bowel sounds are normal.  
Soft, NTND. No rebound or guarding. Mild/minimal R CVAT. Negative Liz sign. No significant RUQ TTP. Musculoskeletal: Normal range of motion. Neurological: She is alert and oriented to person, place, and time. No cranial nerve deficit. Skin: Skin is warm and dry. No rash. Nursing note and vitals reviewed. MDM Number of Diagnoses or Management Options Acute cystitis with hematuria: new and requires workup Calculus of gallbladder without cholecystitis without obstruction: new and requires workup Diagnosis management comments: DDx- UTI, Pyelo, Kidney stone, Cholelithiasis, Cholecystitis, etc. 
------------------------------------------------------------------------------------------- 
UA with large blood and other findings noted below. Sent for Urine Micro. Patient given 1 L normal saline IV fluid bolus, Zofran 4 mg IV, Toradol 30 mg IV. CBC, CMP, urine micro, and CT urogram pending.  
==================================== 
UA with >100 WBCs & >100 RBCs. Urine culture sent. Pt to be given Rocephin 1 g IV. Additionally CT urogram negative for nephrolithiasis or hydronephrosis. There is however evidence of new cholelithiasis. Will obtain right upper quadrant ultrasound to rule out acute cholecystitis. 
--------------------------------------------------------------- 
RUQ Ultrasound with persistent probable cholelithiasis without sonographic evidence of cholecystitis. Will discharge home with antibiotic. Patient unlikely to have bilateral therefore will discharge with Keflex 4 times daily for 7 days. Patient instructed to follow up with PCP in 48-72 hrs  Patient given instructions to schedule appointment with General surgery in regards to gallstones. Amount and/or Complexity of Data Reviewed Clinical lab tests: ordered and reviewed Tests in the radiology section of CPT®: ordered and reviewed Tests in the medicine section of CPT®: ordered and reviewed Review and summarize past medical records: yes Independent visualization of images, tracings, or specimens: yes Risk of Complications, Morbidity, and/or Mortality Presenting problems: moderate Diagnostic procedures: moderate Management options: moderate Patient Progress Patient progress: stable ED Course as of Nov 15 1018 Thu Nov 15, 2018  
1009 CT Urogram IMPRESSION:  
 
1.  New cholelithiasis. 2.  No evidence of hydronephrosis or nephrolithiasis. 3.  Other chronic findings as above. [DF] ED Course User Index 
[DF] Crystal Cabrera MD  
 
 
Procedures Results Include: 
 
Recent Results (from the past 24 hour(s)) CBC WITH AUTOMATED DIFF Collection Time: 11/15/18  9:18 AM  
Result Value Ref Range WBC 13.0 (H) 4.3 - 11.1 K/uL  
 RBC 4.75 4.05 - 5.2 M/uL  
 HGB 13.7 11.7 - 15.4 g/dL HCT 41.4 35.8 - 46.3 % MCV 87.2 79.6 - 97.8 FL  
 MCH 28.8 26.1 - 32.9 PG  
 MCHC 33.1 31.4 - 35.0 g/dL  
 RDW 12.6 % PLATELET 688 398 - 832 K/uL MPV 9.8 9.4 - 12.3 FL ABSOLUTE NRBC 0.00 0.0 - 0.2 K/uL  
 DF AUTOMATED NEUTROPHILS 84 (H) 43 - 78 % LYMPHOCYTES 9 (L) 13 - 44 % MONOCYTES 6 4.0 - 12.0 % EOSINOPHILS 0 (L) 0.5 - 7.8 % BASOPHILS 0 0.0 - 2.0 % IMMATURE GRANULOCYTES 1 0.0 - 5.0 %  
 ABS. NEUTROPHILS 10.9 (H) 1.7 - 8.2 K/UL  
 ABS. LYMPHOCYTES 1.1 0.5 - 4.6 K/UL  
 ABS. MONOCYTES 0.8 0.1 - 1.3 K/UL  
 ABS. EOSINOPHILS 0.0 0.0 - 0.8 K/UL ABS. BASOPHILS 0.0 0.0 - 0.2 K/UL  
 ABS. IMM. GRANS. 0.1 0.0 - 0.5 K/UL METABOLIC PANEL, COMPREHENSIVE Collection Time: 11/15/18  9:18 AM  
Result Value Ref Range Sodium 137 136 - 145 mmol/L Potassium 4.2 3.5 - 5.1 mmol/L Chloride 104 98 - 107 mmol/L  
 CO2 24 21 - 32 mmol/L Anion gap 9 mmol/L Glucose 102 (H) 65 - 100 mg/dL BUN 12 6 - 23 MG/DL Creatinine 0.87 0.6 - 1.0 MG/DL  
 GFR est AA >60 >60 ml/min/1.73m2 GFR est non-AA >60 ml/min/1.73m2 Calcium 8.8 8.3 - 10.4 MG/DL Bilirubin, total 0.5 0.2 - 1.1 MG/DL  
 ALT (SGPT) 64 12 - 65 U/L  
 AST (SGOT) 33 15 - 37 U/L Alk. phosphatase 134 (H) 50 - 130 U/L Protein, total 7.0 g/dL Albumin 3.6 3.5 - 5.0 g/dL Globulin 3.4 2.3 - 3.5 g/dL A-G Ratio 1.1 URINE MICROSCOPIC Collection Time: 11/15/18  9:18 AM  
Result Value Ref Range WBC >100 (H) 0 /hpf  
 RBC >100 (H) 0 /hpf Epithelial cells 10-20 0 /hpf Bacteria 0 0 /hpf Casts 0-3 0 /lpf  
 
 
US ABD LTD Final Result Impression: Persistent probable cholelithiasis without sonographic evidence of  
cholecystitis. CT UROGRAM WO CONT Final Result IMPRESSION:   
  
1.  New cholelithiasis. 2.  No evidence of hydronephrosis or nephrolithiasis. 3.  Other chronic findings as above. Sami Casanova MD; 11/15/2018 @9:09 AM Voice dictation software was used during the making of this note. This software is not perfect and grammatical and other typographical errors may be present.   This note has not been proofread for errors. 
===================================================================

## 2022-08-04 ENCOUNTER — TELEPHONE ANTICOAG (OUTPATIENT)
Dept: CARDIOLOGY CLINIC | Age: 77
End: 2022-08-04

## 2022-08-04 ENCOUNTER — HOME CARE VISIT (OUTPATIENT)
Dept: SCHEDULING | Facility: HOME HEALTH | Age: 77
End: 2022-08-04
Payer: MEDICARE

## 2022-08-04 VITALS
TEMPERATURE: 97.2 F | OXYGEN SATURATION: 98 % | RESPIRATION RATE: 20 BRPM | BODY MASS INDEX: 24.01 KG/M2 | WEIGHT: 182 LBS

## 2022-08-04 LAB — INR, EXTERNAL: 1.5

## 2022-08-04 PROCEDURE — G0299 HHS/HOSPICE OF RN EA 15 MIN: HCPCS

## 2022-08-05 ENCOUNTER — TELEPHONE (OUTPATIENT)
Dept: CARDIOLOGY CLINIC | Age: 77
End: 2022-08-05

## 2022-08-05 VITALS
RESPIRATION RATE: 20 BRPM | BODY MASS INDEX: 24.14 KG/M2 | TEMPERATURE: 97.2 F | OXYGEN SATURATION: 98 % | WEIGHT: 183 LBS

## 2022-08-05 NOTE — TELEPHONE ENCOUNTER
Telephone Call RE:  Lab Reminder      Outcome:     [x] Patient verbalizes understanding    [] Unable to reach   [] Left message              []       [facility]

## 2022-08-08 ENCOUNTER — TELEPHONE ANTICOAG (OUTPATIENT)
Dept: CARDIOLOGY CLINIC | Age: 77
End: 2022-08-08

## 2022-08-08 ENCOUNTER — HOME CARE VISIT (OUTPATIENT)
Dept: SCHEDULING | Facility: HOME HEALTH | Age: 77
End: 2022-08-08
Payer: MEDICARE

## 2022-08-08 LAB — INR, EXTERNAL: 1.4

## 2022-08-08 PROCEDURE — 400014 HH F/U

## 2022-08-08 PROCEDURE — G0299 HHS/HOSPICE OF RN EA 15 MIN: HCPCS

## 2022-08-08 RX ORDER — MELATONIN 5 MG
5 CAPSULE ORAL
COMMUNITY
End: 2022-11-02

## 2022-08-10 ENCOUNTER — TELEPHONE (OUTPATIENT)
Dept: CARDIOLOGY CLINIC | Age: 77
End: 2022-08-10

## 2022-08-10 NOTE — TELEPHONE ENCOUNTER
Telephone Call RE:  Lab Reminder      Outcome:     [x] Patient's wife verbalizes understanding    [] Unable to reach   [] Left message              []     INR 8/11/22. SOURAV Scott

## 2022-08-11 ENCOUNTER — HOME CARE VISIT (OUTPATIENT)
Dept: SCHEDULING | Facility: HOME HEALTH | Age: 77
End: 2022-08-11
Payer: MEDICARE

## 2022-08-11 ENCOUNTER — TELEPHONE ANTICOAG (OUTPATIENT)
Dept: CARDIOLOGY CLINIC | Age: 77
End: 2022-08-11

## 2022-08-11 VITALS
TEMPERATURE: 98.2 F | BODY MASS INDEX: 24.41 KG/M2 | RESPIRATION RATE: 20 BRPM | WEIGHT: 185 LBS | OXYGEN SATURATION: 98 %

## 2022-08-11 DIAGNOSIS — I50.9 CHRONIC CONGESTIVE HEART FAILURE, UNSPECIFIED HEART FAILURE TYPE (HCC): Primary | ICD-10-CM

## 2022-08-11 DIAGNOSIS — Z95.811 LVAD (LEFT VENTRICULAR ASSIST DEVICE) PRESENT (HCC): ICD-10-CM

## 2022-08-11 LAB — INR, EXTERNAL: 1.7

## 2022-08-11 PROCEDURE — G0299 HHS/HOSPICE OF RN EA 15 MIN: HCPCS

## 2022-08-12 ENCOUNTER — TELEPHONE (OUTPATIENT)
Dept: CARDIOLOGY CLINIC | Age: 77
End: 2022-08-12

## 2022-08-12 VITALS
BODY MASS INDEX: 24.54 KG/M2 | TEMPERATURE: 98.2 F | OXYGEN SATURATION: 98 % | RESPIRATION RATE: 20 BRPM | WEIGHT: 186 LBS

## 2022-08-12 RX ORDER — SACUBITRIL AND VALSARTAN 24; 26 MG/1; MG/1
1 TABLET, FILM COATED ORAL 2 TIMES DAILY
Qty: 60 TABLET | Refills: 11 | Status: SHIPPED | OUTPATIENT
Start: 2022-08-12

## 2022-08-12 NOTE — TELEPHONE ENCOUNTER
Telephone Call RE:  Lab Reminder      Outcome:     [x] Patient verbalizes understanding    [] Unable to reach   [] Left message              []       Galan Cease

## 2022-08-15 ENCOUNTER — HOME CARE VISIT (OUTPATIENT)
Dept: SCHEDULING | Facility: HOME HEALTH | Age: 77
End: 2022-08-15
Payer: MEDICARE

## 2022-08-15 ENCOUNTER — TELEPHONE ANTICOAG (OUTPATIENT)
Dept: CARDIOLOGY CLINIC | Age: 77
End: 2022-08-15

## 2022-08-15 LAB — INR, EXTERNAL: 2.1

## 2022-08-15 PROCEDURE — G0299 HHS/HOSPICE OF RN EA 15 MIN: HCPCS

## 2022-08-16 ENCOUNTER — CLINICAL SUPPORT (OUTPATIENT)
Dept: CARDIOLOGY CLINIC | Age: 77
End: 2022-08-16
Payer: MEDICARE

## 2022-08-16 VITALS
TEMPERATURE: 97.4 F | SYSTOLIC BLOOD PRESSURE: 74 MMHG | BODY MASS INDEX: 25.45 KG/M2 | HEART RATE: 75 BPM | RESPIRATION RATE: 16 BRPM | WEIGHT: 192 LBS | OXYGEN SATURATION: 96 % | HEIGHT: 73 IN

## 2022-08-16 VITALS
TEMPERATURE: 98.2 F | WEIGHT: 188 LBS | RESPIRATION RATE: 20 BRPM | BODY MASS INDEX: 24.8 KG/M2 | OXYGEN SATURATION: 98 %

## 2022-08-16 DIAGNOSIS — Z95.811 LVAD (LEFT VENTRICULAR ASSIST DEVICE) PRESENT (HCC): Primary | ICD-10-CM

## 2022-08-16 PROCEDURE — 99211 OFF/OP EST MAY X REQ PHY/QHP: CPT | Performed by: NURSE PRACTITIONER

## 2022-08-16 PROCEDURE — 93750 INTERROGATION VAD IN PERSON: CPT | Performed by: NURSE PRACTITIONER

## 2022-08-16 NOTE — PROGRESS NOTES
Pedrito Lindsay is a 69 y.o. male with a history of ICM with Heartmate 3 implant date of 11/16/2021. Patient was seen in clinic for an appointment with VAD Coordinator. LVAD interrogated and reported to ARTURO Trinh. Received labs from [facility] and reviewed with Monroe Saldivar NP who had no new orders. Discussed gradually increasing activities with care being given to not over heating. Patient verbalized understanding. Visit Vitals  BP (!) 74/0 (BP 1 Location: Right arm, BP Patient Position: Sitting, BP Cuff Size: Adult)   Pulse 75   Temp 97.4 °F (36.3 °C) (Oral)   Resp 16   Ht 6' 1\" (1.854 m)   Wt 192 lb (87.1 kg)   SpO2 96%   BMI 25.33 kg/m²     LVAD   Pump Speed (RPM): 4800  Pump Flow (LPM): 4.1  MAP: (P) 74  PI (Pulsitility Index): 5.5  Power: 3.3  Back Up  at Bedside & Labeled: (P) Yes  Outpatient: (P) Yes  MAP in Therapeutic Range (Outpatient): (P) Yes  Testing  Alarms Reviewed: (P) Yes  Back up SC speed: (P) 4800  Back up Low Speed Limit: (P) 4400  Emergency Equipment with Patient?: (P) Yes  Emergency procedures reviewed?: (P) Yes    Discussed patient's pending discharge from [address] [place] [address]. Patient states he feels comfortable with his LVAD at this point and is comfortable with discharge plans. Patient verbalized understanding to schedule a follow up appointment with an [facility] provider in one month.      Sanjana Brown RN  VAD Coordinator

## 2022-08-16 NOTE — PATIENT INSTRUCTIONS
No Medication changes. Testing Ordered:  Home Health will draw labs in 2 weeks. 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 Your urine should be clear and light yellow straw colored. Follow up in 1 month with Nicolas AvendañoKarol      For further patient and caregiver resources as well as access to online LVAD support groups visit [email or web address]       Please bring your daily sheets and medications to your next appointment. Please monitor your weights daily upon waking and after using the bathroom. Keep a written records of your weights and bring to your next appointment. If you have a weight gain of 3 or more pounds overnight OR 5 or more pounds in one week please contact our office. Thank you for allowing us the privilege of being a part of your healthcare team! Please do not hesitate to contact our office at [phone] with any questions or concerns. Virtual Heart Failure [address] invites you to learn more about heart failure and to share your questions, ideas, and experiences with others. Each month, the Heart Failure Support Group features a new educational topic and a guest speaker, followed by an interactive discussion. Our Heart Failure Nurse Navigator will moderate each session. You will be able to participate by phone, tablet or computer through [employer]. This support group takes place on the 3rd Thursday of each month from 6:00-7:30PM. All individuals living with heart failure and their caregivers are welcome to join. If you are interested in participating, please contact us at [email or web address] and you will be sent the link to join the [employer].

## 2022-08-19 ENCOUNTER — TELEPHONE (OUTPATIENT)
Dept: CARDIOLOGY CLINIC | Age: 77
End: 2022-08-19

## 2022-08-19 ENCOUNTER — HOME CARE VISIT (OUTPATIENT)
Dept: SCHEDULING | Facility: HOME HEALTH | Age: 77
End: 2022-08-19
Payer: MEDICARE

## 2022-08-19 PROCEDURE — G0299 HHS/HOSPICE OF RN EA 15 MIN: HCPCS

## 2022-08-19 NOTE — TELEPHONE ENCOUNTER
Returned call to patient's wife. Annika Verdugo verbalized understanding to call [facility] to inquire about resuming services for remaining 7 sessions for original cardiac rehab order. No further questions at this time.

## 2022-08-19 NOTE — TELEPHONE ENCOUNTER
Telephone Call RE:  Lab Reminder      Outcome:     [x] Patient verbalizes understanding    [] Unable to reach   [] Left message              []       Jerardo Byrne

## 2022-08-22 ENCOUNTER — TELEPHONE ANTICOAG (OUTPATIENT)
Dept: CARDIOLOGY CLINIC | Age: 77
End: 2022-08-22

## 2022-08-22 ENCOUNTER — HOME CARE VISIT (OUTPATIENT)
Dept: SCHEDULING | Facility: HOME HEALTH | Age: 77
End: 2022-08-22
Payer: MEDICARE

## 2022-08-22 VITALS
WEIGHT: 189 LBS | TEMPERATURE: 98.2 F | RESPIRATION RATE: 20 BRPM | OXYGEN SATURATION: 98 % | BODY MASS INDEX: 24.94 KG/M2

## 2022-08-22 LAB — INR, EXTERNAL: 1.3

## 2022-08-22 PROCEDURE — G0299 HHS/HOSPICE OF RN EA 15 MIN: HCPCS

## 2022-08-22 NOTE — PROGRESS NOTES
Lucero Vasquez RN ([facility]) called and reported INR (see anticoag tracker), and stated patient had a 5 lb weight gain (192 lbs) over the last week. She stated patient's abdomen is slightly distended and is BRAND. Patient takes Bumex 2 mg daily.

## 2022-08-24 ENCOUNTER — TELEPHONE (OUTPATIENT)
Dept: CARDIOLOGY CLINIC | Age: 77
End: 2022-08-24

## 2022-08-24 VITALS
BODY MASS INDEX: 25.33 KG/M2 | OXYGEN SATURATION: 98 % | WEIGHT: 192 LBS | RESPIRATION RATE: 20 BRPM | TEMPERATURE: 97.2 F

## 2022-08-24 NOTE — TELEPHONE ENCOUNTER
Telephone Call RE:  Lab Reminder      Outcome:     [x] Patient verbalizes understanding    [] Unable to reach   [] Left message              []       Caryl Gann

## 2022-08-25 ENCOUNTER — TELEPHONE ANTICOAG (OUTPATIENT)
Dept: CARDIOLOGY CLINIC | Age: 77
End: 2022-08-25

## 2022-08-25 ENCOUNTER — HOME CARE VISIT (OUTPATIENT)
Dept: SCHEDULING | Facility: HOME HEALTH | Age: 77
End: 2022-08-25
Payer: MEDICARE

## 2022-08-25 ENCOUNTER — TELEPHONE (OUTPATIENT)
Dept: CARDIOLOGY CLINIC | Age: 77
End: 2022-08-25

## 2022-08-25 LAB — INR, EXTERNAL: 1.5

## 2022-08-25 PROCEDURE — G0299 HHS/HOSPICE OF RN EA 15 MIN: HCPCS

## 2022-08-25 NOTE — TELEPHONE ENCOUNTER
Telephone Call RE:  Lab Reminder      Outcome:     [x] Patient verbalizes understanding    [] Unable to reach   [] Left message              []       Magalys Auguste

## 2022-08-26 VITALS
WEIGHT: 188 LBS | OXYGEN SATURATION: 98 % | RESPIRATION RATE: 20 BRPM | BODY MASS INDEX: 24.8 KG/M2 | TEMPERATURE: 98.2 F

## 2022-08-29 ENCOUNTER — TELEPHONE (OUTPATIENT)
Dept: CARDIOLOGY CLINIC | Age: 77
End: 2022-08-29

## 2022-08-29 ENCOUNTER — HOME CARE VISIT (OUTPATIENT)
Dept: SCHEDULING | Facility: HOME HEALTH | Age: 77
End: 2022-08-29
Payer: MEDICARE

## 2022-08-29 ENCOUNTER — TELEPHONE ANTICOAG (OUTPATIENT)
Dept: CARDIOLOGY CLINIC | Age: 77
End: 2022-08-29

## 2022-08-29 LAB — INR, EXTERNAL: 2.1

## 2022-08-29 PROCEDURE — G0299 HHS/HOSPICE OF RN EA 15 MIN: HCPCS

## 2022-08-29 NOTE — TELEPHONE ENCOUNTER
Call received from Chuy Oden RN with [facility] during visit with patient. She reported wt up to 191lbs from 188lbs on 8/25/22 due to dietary indiscretions on Saturday. He denied any shortness of breath and no swelling noted on assessment per [place]. MAP 80 today. Chuy Oden confirmed patient taking 2mg bumex daily, but did have to double the dose x3 last week due to weight gain. She reported education provided on sodium restriction at her visit today. Informed her will review with provider and contact patient with further recommendations. She verbalized understanding and had no further questions. Nita Casarez RN.

## 2022-08-29 NOTE — TELEPHONE ENCOUNTER
Eliana Mosley NP(8/29/22 12:05 PM)     So is he already doubling his dose? If yes, then he should continue until his weight gets back to 188. If not please have him start for the same goal.       Spoke with patient's wife who confirmed patient took bumex 2mg BID only three days last week, and has resume 2mg daily dosing. Informed her of recommendations per Cozetta Primrose, NP to increase bumex to 2mg BID until weight returns to 188lbs. She verbalized understanding and had no further questions.  Koffi Rogers RN

## 2022-08-30 VITALS
OXYGEN SATURATION: 98 % | BODY MASS INDEX: 25.2 KG/M2 | WEIGHT: 191 LBS | RESPIRATION RATE: 20 BRPM | TEMPERATURE: 97.1 F

## 2022-09-01 ENCOUNTER — TELEPHONE ANTICOAG (OUTPATIENT)
Dept: CARDIOLOGY CLINIC | Age: 77
End: 2022-09-01

## 2022-09-01 ENCOUNTER — HOME CARE VISIT (OUTPATIENT)
Dept: SCHEDULING | Facility: HOME HEALTH | Age: 77
End: 2022-09-01
Payer: MEDICARE

## 2022-09-01 ENCOUNTER — TELEPHONE (OUTPATIENT)
Dept: CARDIOLOGY CLINIC | Age: 77
End: 2022-09-01

## 2022-09-01 LAB — INR, EXTERNAL: 2.2

## 2022-09-01 PROCEDURE — G0299 HHS/HOSPICE OF RN EA 15 MIN: HCPCS

## 2022-09-01 NOTE — TELEPHONE ENCOUNTER
Returned call to patient's wife who requested medication refills and order for cardiac rehab be sent to [facility] AT [place]. Faxed orders to Socorro Thompson NP (VAD Coordinator) at [facility]. Discussed report of  drop from [address] [facility]) with patient who agreed to come to clinic Friday 9/2/22 at 2 pm for driveline culture.

## 2022-09-02 ENCOUNTER — CLINICAL SUPPORT (OUTPATIENT)
Dept: CARDIOLOGY CLINIC | Age: 77
End: 2022-09-02
Payer: MEDICARE

## 2022-09-02 VITALS
TEMPERATURE: 97.6 F | OXYGEN SATURATION: 100 % | BODY MASS INDEX: 25.6 KG/M2 | WEIGHT: 194 LBS | SYSTOLIC BLOOD PRESSURE: 78 MMHG | RESPIRATION RATE: 16 BRPM | HEART RATE: 89 BPM

## 2022-09-02 DIAGNOSIS — Z95.811 LVAD (LEFT VENTRICULAR ASSIST DEVICE) PRESENT (HCC): ICD-10-CM

## 2022-09-02 DIAGNOSIS — R68.89 SUSPECTED SOFT TISSUE INFECTION: ICD-10-CM

## 2022-09-02 PROCEDURE — 93750 INTERROGATION VAD IN PERSON: CPT | Performed by: NURSE PRACTITIONER

## 2022-09-02 PROCEDURE — 99211 OFF/OP EST MAY X REQ PHY/QHP: CPT | Performed by: NURSE PRACTITIONER

## 2022-09-02 RX ORDER — DOXYCYCLINE 100 MG/1
100 CAPSULE ORAL 2 TIMES DAILY
Qty: 20 CAPSULE | Refills: 0 | Status: SHIPPED | OUTPATIENT
Start: 2022-09-02 | End: 2022-09-12

## 2022-09-02 RX ORDER — DOXYCYCLINE 100 MG/1
100 CAPSULE ORAL 2 TIMES DAILY
Qty: 20 CAPSULE | Refills: 0 | Status: SHIPPED | OUTPATIENT
Start: 2022-09-02 | End: 2022-09-02

## 2022-09-02 NOTE — PROGRESS NOTES
Rabia Calloway is a 69 y.o. male with a history of ICM with Heartmate 3 implant date of 4/5/2022. Patient was seen in clinic by LVAD Coordinator for trauma to driveline exit site. Drive line dressing change completed per policy. A small amount of red colored drainage noted on dressing. Wound culture obtained and sent to [facility] (Mal Shepherd NP). Patient instructed to change dressing daily until noted by LVAD Coordinator. LVAD interrogated and reported to ARTURO Osei NP. Reviewed AVS with patient with Doxycycline 100 mg by mouth twice daily for 10 days (Mal Shepherd NP). Patient has no further questions.          Visit Vitals  BP (!) 78/0 (BP 1 Location: Right upper arm, BP Patient Position: Sitting)   Pulse 89   Temp 97.6 °F (36.4 °C) (Oral)   Resp 16   Wt 194 lb (88 kg)   SpO2 100%   BMI 25.60 kg/m²        LVAD   Pump Speed (RPM): 4800  Pump Flow (LPM): 3.7  MAP: 78  PI (Pulsitility Index): 7.1  Power: 3.3  Back Up  at Bedside & Labeled: Yes  Driveline Site Care: Yes  Driveline Dressing: Changed per order  Outpatient: Yes  MAP in Therapeutic Range (Outpatient): Yes  Testing  Alarms Reviewed: Yes  Back up SC speed: 4800  Back up Low Speed Limit: 4400  Emergency Equipment with Patient?: Yes  Emergency procedures reviewed?: Yes  Drive line site inspected?: Yes  Drive line intergrity inspected?: Yes  Drive line dressing changed?: Yes     Sara Garrett RN  VAD Coordinator

## 2022-09-05 ENCOUNTER — TELEPHONE (OUTPATIENT)
Dept: CARDIOLOGY CLINIC | Age: 77
End: 2022-09-05

## 2022-09-05 VITALS
OXYGEN SATURATION: 98 % | RESPIRATION RATE: 20 BRPM | TEMPERATURE: 98.2 F | WEIGHT: 189 LBS | BODY MASS INDEX: 24.94 KG/M2

## 2022-09-05 NOTE — TELEPHONE ENCOUNTER
[id number] - received a page to [facility] pager. Called and spoke with patient and patient's wife who stated patient was having a \"yellow wrench\" alarm. Patient stated his \"green pump running arrows\" were lit. Patient stated  screen read that he was having a BackUp Battery fault. Instructed patient to silence his alarm. Informed patient that he would need to silence this alarm every four hours until he was able to come to clinic tomorrow. Offered patient an 0830 or 1300 visit with LVAD Coordinator. Patient states he will come to clinic at 1300 tomorrow. Dr. Jorge Alberto aMthew made aware.    Nathanael Rodrigues, RN  VAD Coordinator

## 2022-09-06 ENCOUNTER — TELEPHONE (OUTPATIENT)
Dept: CARDIOLOGY CLINIC | Age: 77
End: 2022-09-06

## 2022-09-06 ENCOUNTER — CLINICAL SUPPORT (OUTPATIENT)
Dept: CARDIOLOGY CLINIC | Age: 77
End: 2022-09-06
Payer: MEDICARE

## 2022-09-06 VITALS
WEIGHT: 197.4 LBS | OXYGEN SATURATION: 99 % | SYSTOLIC BLOOD PRESSURE: 78 MMHG | HEART RATE: 87 BPM | BODY MASS INDEX: 26.04 KG/M2 | RESPIRATION RATE: 16 BRPM | TEMPERATURE: 97.9 F

## 2022-09-06 DIAGNOSIS — Z95.811 LVAD (LEFT VENTRICULAR ASSIST DEVICE) PRESENT (HCC): Primary | ICD-10-CM

## 2022-09-06 PROCEDURE — 93750 INTERROGATION VAD IN PERSON: CPT | Performed by: NURSE PRACTITIONER

## 2022-09-06 PROCEDURE — 99211 OFF/OP EST MAY X REQ PHY/QHP: CPT | Performed by: NURSE PRACTITIONER

## 2022-09-06 NOTE — PATIENT INSTRUCTIONS
No Medication changes. No Testing Ordered. Other Recommendations:   Call LVAD Coordinator with any LVAD alarms. 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 Your urine should be clear and light yellow straw colored. Follow up in as scheduled with Nicolas Whitaker [phone]      For further patient and caregiver resources as well as access to online LVAD support groups visit [email or web address]       Please bring your daily sheets and medications to your next appointment. Please monitor your weights daily upon waking and after using the bathroom. Keep a written records of your weights and bring to your next appointment. If you have a weight gain of 3 or more pounds overnight OR 5 or more pounds in one week please contact our office. Thank you for allowing us the privilege of being a part of your healthcare team! Please do not hesitate to contact our office at [phone] with any questions or concerns. Virtual Heart Failure [address] invites you to learn more about heart failure and to share your questions, ideas, and experiences with others. Each month, the Heart Failure Support Group features a new educational topic and a guest speaker, followed by an interactive discussion. Our Heart Failure Nurse Navigator will moderate each session. You will be able to participate by phone, tablet or computer through [address] [place] [address]. This support group takes place on the 3rd Thursday of each month from 6:00-7:30PM. All individuals living with heart failure and their caregivers are welcome to join. If you are interested in participating, please contact us at [email or web address] and you will be sent the link to join the [employer].

## 2022-09-06 NOTE — PROGRESS NOTES
Nicole Hardy is a 69 y.o. male with a history of ICM with Heartmate 3 implant date of 11/16/2021. Patient was seen in clinic by LVAD Coordinator after report of Backup Battery alarm reported. LVAD interrogated and reported to Dana Soria NP. Backup Battery alarms noted but have resolved. Reseated backup battery and no new alarms noted. Patient instructed to call LVAD Coordinator for any new alarms.      Visit Vitals  BP (!) 78/0 (BP 1 Location: Right upper arm, BP Patient Position: Sitting)   Pulse 87   Temp 97.9 °F (36.6 °C) (Oral)   Resp 16   Wt 197 lb 6.4 oz (89.5 kg)   SpO2 99%   BMI 26.04 kg/m²        LVAD   Pump Speed (RPM): 4800  Pump Flow (LPM): 3.8  MAP: 78  PI (Pulsitility Index): 6.3  Power: 3.2  Back Up  at Bedside & Labeled: Yes  Outpatient: Yes  MAP in Therapeutic Range (Outpatient): Yes  Testing  Alarms Reviewed: Yes  Back up SC speed: 4800  Back up Low Speed Limit: 2050 [employer], KIRK  VAD Coordinator

## 2022-09-06 NOTE — TELEPHONE ENCOUNTER
Called and spoke with patient and requested he arrive at his LVAD Coordinator visit earlier than scheduled. Patient states he will arrive ASAP.

## 2022-09-08 ENCOUNTER — TELEPHONE ANTICOAG (OUTPATIENT)
Dept: CARDIOLOGY CLINIC | Age: 77
End: 2022-09-08

## 2022-09-08 LAB
GRAM STN SPEC: NORMAL
INR, EXTERNAL: 2.2
MICROORGANISM/AGENT SPEC: NORMAL

## 2022-09-09 ENCOUNTER — TELEPHONE (OUTPATIENT)
Dept: CARDIOLOGY CLINIC | Age: 77
End: 2022-09-09

## 2022-09-09 NOTE — TELEPHONE ENCOUNTER
Notified patient's spouse that driveline culture is negative. Patient can resume dressing changes every 3 days, should continue the antibiotics as prescribed. Spouse verbalized understanding.

## 2022-09-13 ENCOUNTER — TELEPHONE (OUTPATIENT)
Dept: CARDIOLOGY CLINIC | Age: 77
End: 2022-09-13

## 2022-09-13 NOTE — TELEPHONE ENCOUNTER
Spoke with patient's wife who reported additional BackUp battery alarms. Patient scheduled for a 9/14/22 @1300 visit with VAD Coordinator to replace BackUp battery and to send Log Files to Indira Kinney verbalized understanding.

## 2022-09-14 ENCOUNTER — CLINICAL SUPPORT (OUTPATIENT)
Dept: CARDIOLOGY CLINIC | Age: 77
End: 2022-09-14
Payer: MEDICARE

## 2022-09-14 VITALS
WEIGHT: 194.4 LBS | SYSTOLIC BLOOD PRESSURE: 82 MMHG | BODY MASS INDEX: 25.65 KG/M2 | HEART RATE: 82 BPM | RESPIRATION RATE: 16 BRPM | TEMPERATURE: 98 F | OXYGEN SATURATION: 99 %

## 2022-09-14 DIAGNOSIS — Z95.811 LVAD (LEFT VENTRICULAR ASSIST DEVICE) PRESENT (HCC): Primary | ICD-10-CM

## 2022-09-14 PROCEDURE — 99211 OFF/OP EST MAY X REQ PHY/QHP: CPT | Performed by: NURSE PRACTITIONER

## 2022-09-14 PROCEDURE — 93750 INTERROGATION VAD IN PERSON: CPT | Performed by: NURSE PRACTITIONER

## 2022-09-14 NOTE — PATIENT INSTRUCTIONS
No Medication changes. No Testing Ordered. Other Recommendations:   Call VAD Coordinator for any LVAD Alarms. 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 Your urine should be clear and light yellow straw colored. Follow up on 9/27/22 with Nicolas Thaddeus Whitaker Ellen      For further patient and caregiver resources as well as access to online LVAD support groups visit [email or web address]       Please bring your daily sheets and medications to your next appointment. Please monitor your weights daily upon waking and after using the bathroom. Keep a written records of your weights and bring to your next appointment. If you have a weight gain of 3 or more pounds overnight OR 5 or more pounds in one week please contact our office. Thank you for allowing us the privilege of being a part of your healthcare team! Please do not hesitate to contact our office at [phone] with any questions or concerns. Virtual Heart Failure [address] invites you to learn more about heart failure and to share your questions, ideas, and experiences with others. Each month, the Heart Failure Support Group features a new educational topic and a guest speaker, followed by an interactive discussion. Our Heart Failure Nurse Navigator will moderate each session. You will be able to participate by phone, tablet or computer through [employer]. This support group takes place on the 3rd Thursday of each month from 6:00-7:30PM. All individuals living with heart failure and their caregivers are welcome to join. If you are interested in participating, please contact us at [email or web address] and you will be sent the link to join the [employer].

## 2022-09-14 NOTE — PROGRESS NOTES
[facility] in [place], [place]  LVAD Outpatient Nurse Visit    Chief Complaint   Patient presents with    Follow-up     Issue with LVAD equipment       Visit Vitals  BP (!) 82/0 (BP 1 Location: Right upper arm, BP Patient Position: Sitting)   Pulse 82   Temp 98 °F (36.7 °C) (Oral)   Resp 16   Wt 194 lb 6.4 oz (88.2 kg)   SpO2 99%   BMI 25.65 kg/m²        LVAD   Pump Speed (RPM): 4800  Pump Flow (LPM): 3.2  MAP: 82  PI (Pulsitility Index): 9.7  Power: 3.2  Outpatient: Yes  MAP in Therapeutic Range (Outpatient): Yes  Testing  Alarms Reviewed: Yes  Back up SC speed: 4800  Back up Low Speed Limit: 50 Scooter Rivers is a 69 y.o. male with a history of ICM with Heartmate 3 implant date of 4/5/22. Patient was seen in clinic for appointment with VAD Coordinator to assess BackUp Batter alarm. LVAD interrogation completed and reported to Ron Cleveland NP. Noted continued BackUp Battery alarms despite 29 months of battery life remaining per LVAD Interrogation. Medications reviewed. Downloaded log files and sent to [facility]. Reviewed all information with Ron Cleveland NP who recommended [employer] replacing 11 volt Li-Ion . The 11 volt Li-Ion BackUp battery was replaced to ensure safe operation of device per  guidelines. Will contact the [facility] for replacement battery. All instructions placed in after visit summary and reviewed with patient. Education provided on LVAD alarms. Time given to ask questions. Patient verbalized understanding and had no further questions.      Naz Seth RN  VAD Coordinator

## 2022-09-19 ENCOUNTER — TELEPHONE (OUTPATIENT)
Dept: CARDIOLOGY CLINIC | Age: 77
End: 2022-09-19

## 2022-09-19 NOTE — TELEPHONE ENCOUNTER
Returned call to patient's wife. Patient did not receive his Coletta Grounds from the [address] A 30 day prescription of patient's Coletta Grounds was sent to patient's local pharmacy while patient's wife calls and discusses Coletta Grounds with [address].

## 2022-09-21 ENCOUNTER — TELEPHONE (OUTPATIENT)
Dept: CARDIOLOGY CLINIC | Age: 77
End: 2022-09-21

## 2022-09-21 NOTE — TELEPHONE ENCOUNTER
Contacted patient via phone for lab reminder. Telephone Call RE:  Lab Reminder      Outcome:     [x] Patient verbalizes understanding    [] Unable to reach   [] Left message              [x]   Spoke with patient for INR reminder.     Nicolas Michelle 112

## 2022-09-22 ENCOUNTER — TELEPHONE ANTICOAG (OUTPATIENT)
Dept: CARDIOLOGY CLINIC | Age: 77
End: 2022-09-22

## 2022-09-22 DIAGNOSIS — Z95.811 LVAD (LEFT VENTRICULAR ASSIST DEVICE) PRESENT (HCC): ICD-10-CM

## 2022-09-22 DIAGNOSIS — I50.9 CHRONIC CONGESTIVE HEART FAILURE, UNSPECIFIED HEART FAILURE TYPE (HCC): ICD-10-CM

## 2022-09-22 DIAGNOSIS — R06.02 SOB (SHORTNESS OF BREATH): ICD-10-CM

## 2022-09-22 DIAGNOSIS — Z79.01 CHRONIC ANTICOAGULATION: Primary | ICD-10-CM

## 2022-09-22 LAB — INR, EXTERNAL: 2.2

## 2022-09-22 NOTE — PROGRESS NOTES
Received a message from Tino Mercado NP at the [facility]. Patient is prescribed Farxiga by [facility] which is not available through the [facility]. Spoke with patient who states he is willing to switch to Jardiance if advised ok by [facility]. Discussed with Ben Payne NP who states ok to switch patient from [place] to [place]. Secure email sent to Tino Mercado NP at the [facility] with Arya Pollard to switch patient to [place].

## 2022-09-26 ENCOUNTER — TELEPHONE (OUTPATIENT)
Dept: CARDIOLOGY CLINIC | Age: 77
End: 2022-09-26

## 2022-09-26 NOTE — PATIENT INSTRUCTIONS
Medication changes:    DECREASE Farxiga to 5mg (half a tablet) by mouth daily. Please call us when you run out [address] starting the Shiela Proud. DECREASE bumetanide (Bumex) to 0.5mg (half of a 1mg tablet) by mouth daily. An updated prescription has been sent to the pharmacy for you today. Testing Ordered: An order for Echo has been placed to be done in December. You will be receiving an automated call from Coordination of Care to schedule this test. If you are unavailable to receive the call or would like to contact coordination of care yourself you may contact [phone] to schedule. You will need to contact coordination of care yourself if you miss their calls as they will only make 3 attempts to reach you. A six minute walk was completed in clinic today    Orthostatic blood pressures done in clinic today. Urine sample has been collected today. Our office will notify you of results. A referral to Outpatient Physical Therapy has been placed. You will be contacted for scheduling. Other Recommendations:     Please contact our office next Tuesday October 4th with an update on your weights and urine output. If your urine output remains high after the medication changes today, Dr. Maco Loja recommends you see a urologist for further evaluation. Dr. Maco Loja also recommends a weight gain goal of 4lbs. If you notice a weight gain of more than 4lbs please contact our office right away. 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 Your urine should be clear and light yellow straw colored.          Follow up in 1 month with Nicolas Hughes      For further patient and caregiver resources as well as access to online LVAD support groups visit [email or web address]       Please bring your daily sheets and medications to your next appointment. Please monitor your weights daily upon waking and after using the bathroom. Keep a written records of your weights and bring to your next appointment. If you have a weight gain of 3 or more pounds overnight OR 5 or more pounds in one week please contact our office. Thank you for allowing us the privilege of being a part of your healthcare team! Please do not hesitate to contact our office at [phone] with any questions or concerns. Virtual Heart Failure [address] invites you to learn more about heart failure and to share your questions, ideas, and experiences with others. Each month, the Heart Failure Support Group features a new educational topic and a guest speaker, followed by an interactive discussion. Our Heart Failure Nurse Navigator will moderate each session. You will be able to participate by phone, tablet or computer through [employer]. This support group takes place on the 3rd Thursday of each month from 6:00-7:30PM. All individuals living with heart failure and their caregivers are welcome to join. If you are interested in participating, please contact us at [email or web address] and you will be sent the link to join the [employer].

## 2022-09-26 NOTE — TELEPHONE ENCOUNTER
Telephone Call RE:  Appointment reminder     Outcome:     [] Patient confirmed appointment   [] Patient rescheduled appointment for    [x] Unable to reach  [x] Left message             [] Other:     ---------------------             [] Screened for COVID    Asked patient to return our call if he has been around anyone with Covid or have any symptoms     Andrea Roy

## 2022-09-27 ENCOUNTER — OFFICE VISIT (OUTPATIENT)
Dept: CARDIOLOGY CLINIC | Age: 77
End: 2022-09-27
Payer: MEDICARE

## 2022-09-27 VITALS
TEMPERATURE: 96.2 F | RESPIRATION RATE: 18 BRPM | SYSTOLIC BLOOD PRESSURE: 82 MMHG | HEIGHT: 73 IN | OXYGEN SATURATION: 95 % | WEIGHT: 197.2 LBS | BODY MASS INDEX: 26.14 KG/M2 | HEART RATE: 80 BPM

## 2022-09-27 DIAGNOSIS — Z95.811 LVAD (LEFT VENTRICULAR ASSIST DEVICE) PRESENT (HCC): ICD-10-CM

## 2022-09-27 DIAGNOSIS — I50.9 CHRONIC CONGESTIVE HEART FAILURE, UNSPECIFIED HEART FAILURE TYPE (HCC): Primary | ICD-10-CM

## 2022-09-27 DIAGNOSIS — R53.1 WEAKNESS GENERALIZED: ICD-10-CM

## 2022-09-27 DIAGNOSIS — Z79.899 HIGH RISK MEDICATION USE: ICD-10-CM

## 2022-09-27 DIAGNOSIS — R06.02 SOB (SHORTNESS OF BREATH): ICD-10-CM

## 2022-09-27 PROCEDURE — 94618 PULMONARY STRESS TESTING: CPT | Performed by: INTERNAL MEDICINE

## 2022-09-27 PROCEDURE — G8417 CALC BMI ABV UP PARAM F/U: HCPCS | Performed by: INTERNAL MEDICINE

## 2022-09-27 PROCEDURE — 99215 OFFICE O/P EST HI 40 MIN: CPT | Performed by: INTERNAL MEDICINE

## 2022-09-27 PROCEDURE — 1101F PT FALLS ASSESS-DOCD LE1/YR: CPT | Performed by: INTERNAL MEDICINE

## 2022-09-27 PROCEDURE — G8536 NO DOC ELDER MAL SCRN: HCPCS | Performed by: INTERNAL MEDICINE

## 2022-09-27 PROCEDURE — 1123F ACP DISCUSS/DSCN MKR DOCD: CPT | Performed by: INTERNAL MEDICINE

## 2022-09-27 PROCEDURE — G8427 DOCREV CUR MEDS BY ELIG CLIN: HCPCS | Performed by: INTERNAL MEDICINE

## 2022-09-27 PROCEDURE — G8510 SCR DEP NEG, NO PLAN REQD: HCPCS | Performed by: INTERNAL MEDICINE

## 2022-09-27 PROCEDURE — 93750 INTERROGATION VAD IN PERSON: CPT | Performed by: INTERNAL MEDICINE

## 2022-09-27 RX ORDER — BUMETANIDE 1 MG/1
0.5 TABLET ORAL DAILY
Qty: 15 TABLET | Refills: 2 | Status: SHIPPED | OUTPATIENT
Start: 2022-09-27 | End: 2022-11-02 | Stop reason: SDUPTHER

## 2022-09-27 NOTE — PROGRESS NOTES
Rabia Calloway is a 69 y.o. male with a history of ICM with Heartmate 3 implant date of 4/5/22. Patient was seen in clinic for routine follow up with Dr. Meera Martínez. LVAD interrogated and reported to Dr. Meera Martínez. Drive line dressing change completed per policy. No signs or symptoms of infection noted. Patient has a small area (approximately 2 cm x 2 cm) medial to driveline exit site with MARSI. Area cleaned and dressed along with driveline. Orthostatics completed and reported to Dr. Meera Martínez. Six minute walk and reported to Dr Meera Martínez. Reviewed AVS including medication changed to [place] and Bumex. No further questions at this time.    Sara Garrett RN  VAD Coordinator

## 2022-09-27 NOTE — PROGRESS NOTES
Met with pt and wife during visit. Pt. Verbalized no psychosocial needs at this time.  He and care giver each completed and returned satisfaction surveys today

## 2022-09-27 NOTE — PROGRESS NOTES
[facility] in [facility], [address] Note    Patient name: Beverly Lopez  Patient : 1945  Patient MRN: [id number]  Date of service: 22    Primary care physician: Kenya Ramires MD    LVAD cardiologist: Lakesha Arnett MD    CHIEF COMPLAINT:  Postoperative care    PLAN OF CARE:  69 y.o. male with severe ischemic cardiomyopathy, LVEF 15-20% s/p HM3 LVAD implant as DT on 22 by Dr. Demond Guidry c/b intraoperative bleeding requiring multiple blood products and subsequent RV failure with severe TR requiring prolonged dobutamine wean and long term CT support, followed by left thoracentesis 22 by IR; 1.4L removed and no further complications; doing well      RECOMMENDATIONS:  Continue speed 4800rpms, low speed 4400  No BB due to RV failure   Continue Entresto 24/26mg BID; not orthostatic  Cont Eplerenone 25mg daily  Decrease bumex to 0.5mg daily; watch that weight does not increase more than 4lbs  Decrease Farxiga to 5mg daily due to polyuria  Continue hydralazine 10mg TID  Continue warfarin, goal INR 2-3 see tracker for details   ASA 81mg daily  Continue Flomax 0.4mg daily  Check UA  If UA clean and frequent urination does not resolve with reduced diuretics/SGLT2i, then urology to see  Cont pepcid  Routine LVAD labs   Driveline dressing changes 3 times a week  CPAP qHS   Advanced care plan forms on file   Will have CT chest as follow up on lung nodule for heme/onc- 8/3/22  [facility] and send referral to cardiac rehab; declined coverage  Referral for PT to evaluate and treat for balance/safety/strengthening exercises  Return to [facility] in one month with NP      IMPRESSION:  S/p LVAD implant as DT  Post-op RV failure requiring prolonged use of dobutamine  Chronic systolic heart failure  Stage D, NYHA class IV symptoms  Non-ischemic cardiomyopathy, LVEF 20% with LVEDD 6.2  RV dysfunction  At risk of sudden cardiac death  Recent cardiac arrest   S/p ICD (1/2013, [employer] followed by [facility]); New implant on 10/21/2021 [place] [facility]  Coronary artery disease  S/p multiple interventions  S/p 4V CABG (8/2012)  [facility] (7/2019) severe stenosis of LIMA to LAD anastomosis site, SVG graft to OM is occluded, SVG graft to RCA 40-50%, LAD occluded proximally, severe proximal LCx, RCA is the long . PET (6/2019) EF 24% with anterior lateral and inferior reversible defect  Cardiac risk factors  HTN  HL  DM2  SRUTHI on CPAP  MIld carotid stenosis  Anemia, microcytic  Iron deficiency  DVT with filter  Pulmonary nodules   Dysphagia      LIFE GOALS:  Patient life goals discussed. CARDIAC IMAGING:  Echo (6/28/22): LVEF 25 - 30%. Mildly increased wall thickness. Severe global hypokinesis present     Echo (6/2/22) LVEF 20-25%, mild MR, mod to severe TR, LVIDd 4.9cm, RVIDd 6cm, TAPSE 0.9cm      Echo (5/4/22) LVEF 15-20%, mod TR, LVIDd 5.3 cm, TAPSE 0.9 cm      Echo (4/26/22) - LVIDd 4.9 cm, TAPSE 0.5 cm,      Echo (4/18/22)- LVEF 15-20%, severe TR, TAPSE 0.5cm, RVIDd 6cm      Echo (4/8/22): LVEF 10-15%. Severe global hypokinesis present. 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 Echo (3/2/22): LVEF 10-15%; Mod-severe MR      Echo (11/23/21): LVEF 15-20%; Mod-severe, MR, mod-TR     Echo (5/20/21): LVEF is 20 - 25%. Severely and globally reduced systolic function. Severe (grade 4) left ventricular diastolic dysfunction. EKG (5/20/21) SB with 1degree AV block, QRS 116ms     [facility] (5/24/21) Native Coronaries: LM - moderate to severe distal disease 50%. % prox occluded (), heavily calcified, LCx: 80% proximal stenosis. OM1 is  (seen filling faintly via collaterals). OM2 99% stenosis. RCA: 100% proximal occluded. LIMA to LAD: patent, supplies only a diagonal branch (probably D2).  The LAD distal to the bifurcation is 100% occluded () and fills via right to left collaterals off the SVG to RCA. SVG to R-PDA: patent with moderate diffuse irregularities but no obstructive disease in the graft. No appealing interventional targets. 6 Min Walk Report 9/27/2022 5/4/2022 5/2/2022 11/12/2021 7/6/2021 5/20/2021   (PRE) HR 70 82 82 88 98 74   (PRE) O2 Sat 96 - 98 100 - 99   (POST) HR 79 91 88 102 - 81   (POST) O2 Sat 100 - 98 99 98% on Ra 99   Distance in Meters 336.8 191.41 181.36 386. 58 - 1158.24                 CXR Results  (Last 48 hours)                 06/02/22 1108   XR CHEST PA LAT Final result     Impression:       Increased left-sided pleural effusion and atelectasis. Narrative:   Clinical history: Hx of effusion, s/p LVAD   INDICATION:   Hx of effusion, s/p LVAD   COMPARISON: 5/10/2020       FINDINGS:    PA and lateral views of the chest are obtained. The cardiopericardial silhouette is stable in appearance status post anatomy. Cardiac pacer and left ventricular assist device redemonstrated. Increased   left-sided pleural effusion and atelectasis. XR Results (most recent):  Results from Hospital Encounter encounter on 06/30/22     XR CHEST PA LAT     Narrative  Exam:  2 view chest     Indication: Pleural effusion. COMPARISON: 6/9/2022     PA and lateral views demonstrate normal heart size. Patient status post median  sternotomy. LVAD and ICD are in place. There is a small left pleural effusion. This has decreased in the interval.  Linear scarlike density projects in the left mid zone unchanged dating back to  5/10/2022. No right pleural effusion. Lungs demonstrate no pneumonia or pulmonary edema. Impression  1.  The left pleural effusion is small and has decreased in the interval.           BRIEF HISTORY OF PRESENT ILLNESS:  Yudith Ellis is a 69 y.o. male with h/o HTN, HL, DM2, SRUTHI on CPAP, chronic systolic heart failure, stage D, NYHA class IV symptoms, non-ischemic cardiomyopathy, LVEF 20% with LVEDD 6.2, RV dysfunciton, TAPSE 1.22, TBili 1.5 likely from cardiac congestion, recent cardiac arrest s/p ICD (1/2013, [employer] followed by [address] [place] [address]), coronary artery disease s/p multiple interventions s/p 4V CABG (8/2012), [facility] (7/2019) severe stenosis of LIMA to LAD anastomosis site, SVG graft to OM is occluded, SVG graft to RCA 40-50%, LAD occluded proximally, severe proximal LCx, RCA is the long . PET (6/2019) EF 24% with anterior lateral and inferior reversible defect. Anemia, microcytic with iron deficiency, DVT with filter. Patient was referred to [facility] by Dr. Andrew Pardo in 2021 for evaluation of his candidacy for advanced therapies. Patient agreed to inpatient initiation of palliative infusion of chronic inotropes and evaluation for LVAD-DT. Patient was admitted 5/2-5/25/21. Patient was started on milrinone infusion and had first part of LVAD evaluation completed. Right and left heart cath on 5/24/21 showed severe diffuse native vessel CAD, with patent grafts (LIMA to D2, SVG to RCA). Pt was found to have pulmonary nodule during workup. Antiplatelet therapy was held during hospitalization and after discharge due to anticipated pulmonary nodule biopsy, bone marrow biopsy and EGD/C-Scope as part of LVAD workup. Pt found to have adenocarcinoma of the lung, and so LVAD was deferred pending treatment. Patient completed radiation treatment for adenocarcinoma of the lung. Hem-onc cleared patient for VAD implant with 90% 2 year prognosis. He presented to [facility] on 4/3 for admission for planned LVAD implant which he underwent on 4/5/22. Was re-do sternotomy, bleeding intra-op, required cryo, k-centra, FFP, Plt, and cellsaver in OR. Had RV dysfunction noted intra-op; requiring lower VAD speed and dual inotrope support. 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 He also had issues with significant CT drainage requiring prolonged placement.   He was eventually discharged home on POD 29 with GDMT (no BB due to RV failure) with HH SN/PT and OT. He had persistent left pleural effusion and underwent thoracentesis in IR on 6/9/22 with 1.4L removed. INTERVAL HISTORY:  Today, patient presents for routine clinic visit. Patient is doing well, although he does not have as much energy gardening. His main compliant is frequent urination, has to go to bathroom many times when they go out. Patient walked to our clinic from parking garage without having to stop. Patient can walk half a block without symptoms of fatigue or shortness of breath or chest pain. Patient can walk one flight of stairs slowly. Patient can perform home activities without problem. Patient denies symptoms of volume overload or leg edema. Patient denies abdominal bloating or change of appetite. Patient's weight remained stable. Patient denies orthopnea, PND or nocturia. Patient denies irregular heart rate or palpitations. No presyncope or syncope. ICD has not fired. Patient denies other cardiac symptoms such as chest pain or leg pain with walking. Patient is compliant with fluid restriction and taking medications as prescribed. Patient manages his own medications. Have you had any alarms     no  Have you had any redness at site   no  Have you had any drainage at site/on dressing no  Have you had any pain at site   no  Have you had any swelling at site   no  Do you need any equipment? No    REVIEW OF SYSTEMS:  General: Denies fever, night sweats. Ear, nose and throat: Denies difficulty hearing, sinus problems, runny nose, post-nasal drip, ringing in ears, mouth sores, loose teeth, ear pain, nosebleeds, sore throate, facial pain or numbess  Cardiovascular: see above in the interval history  Respiratory: Denies cough, wheezing, sputum production, hemoptysis.   Gastrointestinal: Denies heartburn, constipation, diarrhea, abdominal pain, nausea, vomiting, difficulty swallowing, blood in stool  Kidney and bladder: Denies painful urination, frequent urination, urgency  Musculoskeletal: Denies joint pain, muscle weakness  Skin and hair: Denies change in existing skin lesions, hair loss or increase, breast changes    PHYSICAL EXAM:  Visit Vitals  BP (!) 78/0 (BP 1 Location: Right upper arm, BP Patient Position: Sitting, BP Cuff Size: Adult) Comment: MAP   Pulse 70   Temp (!) 96.2 °F (35.7 °C) (Oral)   Resp 18   Ht 6' 1\" (1.854 m)   Wt 197 lb 3.2 oz (89.4 kg)   SpO2 95%   BMI 26.02 kg/m²      LVAD   Pump Speed (RPM): 4800  Pump Flow (LPM): 3.4  MAP: 78  PI (Pulsitility Index): 7.8  Power: 3.3  Back Up  at Bedside & Labeled: Yes  Driveline Site Care: Yes  Driveline Dressing: Changed per order  Outpatient: Yes  MAP in Therapeutic Range (Outpatient): Yes  Testing  Alarms Reviewed: Yes  Back up SC speed: 4800  Back up Low Speed Limit: 4400  Emergency Equipment with Patient?: Yes  Emergency procedures reviewed?: Yes  Drive line site inspected?: Yes  Drive line intergrity inspected?: Yes  Drive line dressing changed?: Yes    General: Patient is well developed, well-nourished in no acute distress  HEENT: Normocephalic and atraumatic. No scleral icterus. Pupils are equal, round and reactive to light and accomodation. No conjunctival injection. Oropharynx is clear. Neck: Supple. No evidence of thyroid enlargements or lymphadenopathy. JVD: Cannot be appreciated   Lungs: Breath sounds are equal and clear bilaterally. No wheezes, rhonchi, or rales. Heart: VAD hum  Abdomen: Soft, no mass or tenderness. No organomegaly or hernia. Bowel sounds present. Genitourinary and rectal: deferred  Extremities: No cyanosis, clubbing, or edema. Neurologic: No focal sensory or motor deficits are noted. Grossly intact. Psychiatric: Awake, alert an doriented x 3. Appropriate mood and affect. Skin: Warm, dry and well perfused. No lesions, nodules or rashes are noted.     PAST MEDICAL HISTORY:  Past Medical History:   Diagnosis Date    CAD (coronary artery disease) '90 '97, '13 x 2    MI, code ice in 2013 at [facility]    Calculus of kidney     Colonic polyps     Congestive heart failure, unspecified     Diabetes ([facility] [address]     Gastritis     Hypercholesterolemia     Sleep apnea        PAST SURGICAL HISTORY:  Past Surgical History:   Procedure Laterality Date    COLONOSCOPY  04/06/2011    16, due 21    COLONOSCOPY N/A 3/2/2022    COLONOSCOPY    :- performed by Shy Azul MD at [facility] ENDOSCOPY    ENDOSCOPY, COLON, DIAGNOSTIC      11, due 16    HX CORONARY ARTERY BYPASS GRAFT  8/22/12    x 4 vessel by MISSY Ramirez    HX HEENT      LASIK    HX PACEMAKER PLACEMENT  1/30/13    CT CARDIAC SURG PROCEDURE UNLIST  2012    x 4 vessels       FAMILY HISTORY:  Family History   Problem Relation Age of Onset    Heart Disease Mother         MI    Heart Disease Father         CAD & PVD    Lung Disease Father     Cancer Father         lung    Diabetes Maternal Grandmother     Heart Disease Other         CAD    Stroke Sister        SOCIAL HISTORY:  Social History     Socioeconomic History    Marital status:    Tobacco Use    Smoking status: Never    Smokeless tobacco: Never   Vaping Use    Vaping Use: Never used   Substance and Sexual Activity    Alcohol use: Yes     Alcohol/week: 0.0 standard drinks     Comment:  VERY RARE    Drug use: No       LABORATORY RESULTS:  Labs Latest Ref Rng & Units 8/2/2022   PSA 0.01 - 4.0 ng/mL 2.4   Some recent data might be hidden       ALLERGY:  Allergies   Allergen Reactions    Oxycodone Anaphylaxis    Pcn [Penicillins] Hives        CURRENT MEDICATIONS:    Current Outpatient Medications:     sacubitriL-valsartan (Entresto) 24-26 mg tablet, Take 1 Tablet by mouth two (2) times a day., Disp: 60 Tablet, Rfl: 11    magnesium oxide (MAG-OX) 400 mg tablet, TAKE 1 TABLET BY MOUTH DAILY, Disp: 30 Tablet, Rfl: 3    famotidine (PEPCID) 20 mg tablet, Take 1 Tablet by mouth every twelve (12) hours. , Disp: 60 Tablet, Rfl: 2    hydrALAZINE (APRESOLINE) 10 mg tablet, Take 1 Tablet by mouth three (3) times daily. , Disp: 290 Tablet, Rfl: 3    mirtazapine (REMERON) 7.5 mg tablet, Take 1 Tablet by mouth nightly., Disp: 90 Tablet, Rfl: 0    eplerenone (INSPRA) 25 mg tablet, Take 1 Tablet by mouth daily. , Disp: 90 Tablet, Rfl: 1    bumetanide (BUMEX) 2 mg tablet, Take 1 Tablet by mouth daily. Take 1 tablet by mouth daily (Patient taking differently: Take 2 mg by mouth daily.), Disp: 90 Tablet, Rfl: 1    zitzqhmcr-XG-vmqsfdee-guaifen (Mucinex Fast-Max Cold-Flu) 10- mg/20 mL liqd, Take 20 mL by mouth every four (4) hours as needed for Cough. Take 20 mL by mouth every 4 hours as needed for cough, Disp: , Rfl:     tamsulosin (FLOMAX) 0.4 mg capsule, Take 1 Capsule by mouth daily. , Disp: 30 Capsule, Rfl: 2    warfarin (COUMADIN) 2.5 mg tablet, Take 2 Tablets by mouth daily. , Disp: 90 Tablet, Rfl: 2    potassium chloride SR (K-TAB) 20 mEq tablet, Take 2 Tablets by mouth two (2) times a day., Disp: 360 Tablet, Rfl: 1    metFORMIN (GLUCOPHAGE) 500 mg tablet, Take 1 Tablet by mouth two (2) times daily (with meals). , Disp: 180 Tablet, Rfl: 3    acetaminophen (TYLENOL) 500 mg tablet, Take 500 mg by mouth every six (6) hours as needed for Fever or Pain., Disp: , Rfl:     aspirin delayed-release 81 mg tablet, Take 81 mg by mouth daily. , Disp: , Rfl:     empagliflozin (JARDIANCE) 25 mg tablet, Take 0.5 Tablets by mouth daily. (Patient not taking: Reported on 9/27/2022), Disp: 45 Tablet, Rfl: 3    melatonin 5 mg cap capsule, Take 5 mg by mouth nightly.  (Patient not taking: No sig reported), Disp: , Rfl:     PATIENT CARE TEAM:  Patient Care Team:  Nino Reddy MD as PCP - General (Internal Medicine Physician)  Nino Reddy MD as PCP - [facility] Provider  Anshul May MD as Physician (Cardiovascular Disease Physician)  Luis Eduardo Lomax MD as Physician (Gastroenterology)  Janis Hdz MD as Physician (Orthopedic Surgery)  Milagros Lucero Sarah Smith MD as Physician (Ophthalmology)  Nancy Hannon MD (Cardiovascular Disease Physician)  Richard Fisher MD (Cardiovascular Disease Physician)     Thank you for your referral and allowing me to participate in this patient's care.     Donald Moreno MD PhD  [address] [place] [address], [address]  Phone: [phone]  Fax: [phone]      Total visit time: 45 minutes   (> 50% spent face-to-face counseling)

## 2022-09-28 LAB
APPEARANCE UR: CLEAR
BACTERIA #/AREA URNS HPF: NORMAL /[HPF]
BILIRUB UR QL STRIP: NEGATIVE
CASTS URNS QL MICRO: NORMAL /LPF
COLOR UR: YELLOW
EPI CELLS #/AREA URNS HPF: NORMAL /HPF (ref 0–10)
GLUCOSE UR QL STRIP: ABNORMAL
HGB UR QL STRIP: NEGATIVE
KETONES UR QL STRIP: NEGATIVE
LEUKOCYTE ESTERASE UR QL STRIP: NEGATIVE
MICRO URNS: ABNORMAL
MICRO URNS: ABNORMAL
NITRITE UR QL STRIP: NEGATIVE
PH UR STRIP: 5 [PH] (ref 5–7.5)
PROT UR QL STRIP: NEGATIVE
RBC #/AREA URNS HPF: NORMAL /HPF (ref 0–2)
SP GR UR STRIP: 1.01 (ref 1–1.03)
URINALYSIS REFLEX, 377202: ABNORMAL
UROBILINOGEN UR STRIP-MCNC: 0.2 MG/DL (ref 0.2–1)
WBC #/AREA URNS HPF: NORMAL /HPF (ref 0–5)

## 2022-10-03 ENCOUNTER — TELEPHONE (OUTPATIENT)
Dept: CARDIOLOGY CLINIC | Age: 77
End: 2022-10-03

## 2022-10-03 NOTE — TELEPHONE ENCOUNTER
Called and spoke patient's wife who agreed to send Physical Therapy ro [facility] which is in network with patient's insurance. Called and spoke with [facility] ([phone]) who agreed to take patient with LVAD. Referral faxed ([phone]) to [facility].

## 2022-10-05 ENCOUNTER — TELEPHONE (OUTPATIENT)
Dept: CARDIOLOGY CLINIC | Age: 77
End: 2022-10-05

## 2022-10-05 NOTE — TELEPHONE ENCOUNTER
Telephone Call RE:  Lab Reminder      Outcome:     [] Patient verbalizes understanding    [] Unable to reach   [] Left message              [x] Patient's wife verbalized understanding to have patient go to [facility] tomorrow (10/6/22) for a full set of labs.        Vic Nogueira RN

## 2022-10-07 ENCOUNTER — TELEPHONE ANTICOAG (OUTPATIENT)
Dept: CARDIOLOGY CLINIC | Age: 77
End: 2022-10-07

## 2022-10-07 LAB
ALBUMIN SERPL-MCNC: 3.9 G/DL (ref 3.7–4.7)
ALBUMIN/GLOB SERPL: 1.2 {RATIO} (ref 1.2–2.2)
ALP SERPL-CCNC: 173 IU/L (ref 44–121)
ALT SERPL-CCNC: 5 IU/L (ref 0–44)
AST SERPL-CCNC: 41 IU/L (ref 0–40)
BILIRUB SERPL-MCNC: 0.3 MG/DL (ref 0–1.2)
BUN SERPL-MCNC: 24 MG/DL (ref 8–27)
BUN/CREAT SERPL: 21 (ref 10–24)
CALCIUM SERPL-MCNC: 9.1 MG/DL (ref 8.6–10.2)
CHLORIDE SERPL-SCNC: 101 MMOL/L (ref 96–106)
CO2 SERPL-SCNC: 22 MMOL/L (ref 20–29)
CREAT SERPL-MCNC: 1.13 MG/DL (ref 0.76–1.27)
EGFR: 67 ML/MIN/1.73
ERYTHROCYTE [DISTWIDTH] IN BLOOD BY AUTOMATED COUNT: 18.7 % (ref 11.6–15.4)
GLOBULIN SER CALC-MCNC: 3.3 G/DL (ref 1.5–4.5)
GLUCOSE SERPL-MCNC: 100 MG/DL (ref 70–99)
HCT VFR BLD AUTO: 42.8 % (ref 37.5–51)
HGB BLD-MCNC: 13.2 G/DL (ref 13–17.7)
INR PPP: 1.8 (ref 0.9–1.2)
LDH SERPL-CCNC: 185 IU/L (ref 121–224)
MAGNESIUM SERPL-MCNC: 2.2 MG/DL (ref 1.6–2.3)
MCH RBC QN AUTO: 23.2 PG (ref 26.6–33)
MCHC RBC AUTO-ENTMCNC: 30.8 G/DL (ref 31.5–35.7)
MCV RBC AUTO: 75 FL (ref 79–97)
NT-PROBNP SERPL-MCNC: 1732 PG/ML (ref 0–486)
PLATELET # BLD AUTO: 200 X10E3/UL (ref 150–450)
POTASSIUM SERPL-SCNC: 4.8 MMOL/L (ref 3.5–5.2)
PROT SERPL-MCNC: 7.2 G/DL (ref 6–8.5)
PROTHROMBIN TIME: 18.3 SEC (ref 9.1–12)
RBC # BLD AUTO: 5.69 X10E6/UL (ref 4.14–5.8)
SODIUM SERPL-SCNC: 138 MMOL/L (ref 134–144)
WBC # BLD AUTO: 3.7 X10E3/UL (ref 3.4–10.8)

## 2022-10-12 ENCOUNTER — TELEPHONE (OUTPATIENT)
Dept: CARDIOLOGY CLINIC | Age: 77
End: 2022-10-12

## 2022-10-13 ENCOUNTER — TELEPHONE ANTICOAG (OUTPATIENT)
Dept: CARDIOLOGY CLINIC | Age: 77
End: 2022-10-13

## 2022-10-13 LAB — INR, EXTERNAL: 2.2

## 2022-10-19 ENCOUNTER — TELEPHONE (OUTPATIENT)
Dept: CARDIOLOGY CLINIC | Age: 77
End: 2022-10-19

## 2022-10-19 NOTE — TELEPHONE ENCOUNTER
Spoke with patient to move appt from 11/1 due to provider not available.     Patient agreed to reschedule to 11/10 @2pm

## 2022-10-19 NOTE — TELEPHONE ENCOUNTER
Telephone Call RE:  Lab Reminder      Outcome:     [] Patient verbalizes understanding    [] Unable to reach   [x] Left message              []     Reminded of INR due 10/20/22.     Jarvis Ortiz RN

## 2022-10-20 ENCOUNTER — TELEPHONE ANTICOAG (OUTPATIENT)
Dept: CARDIOLOGY CLINIC | Age: 77
End: 2022-10-20

## 2022-10-20 ENCOUNTER — TELEPHONE (OUTPATIENT)
Dept: CARDIOLOGY CLINIC | Age: 77
End: 2022-10-20

## 2022-10-20 DIAGNOSIS — Z79.01 CHRONIC ANTICOAGULATION: ICD-10-CM

## 2022-10-20 DIAGNOSIS — I50.9 CHRONIC CONGESTIVE HEART FAILURE, UNSPECIFIED HEART FAILURE TYPE (HCC): Primary | ICD-10-CM

## 2022-10-20 LAB — INR, EXTERNAL: 1.9

## 2022-10-20 NOTE — TELEPHONE ENCOUNTER
Contacted wife to confirm INR. She stated patient kept receiving error message from meter. Inquired if he had reached out to [employer] to troubleshoot. She stated he had not and had informed her, \"they'll see it and they'll call. \" Reviewed with Lucila Vinson NP who recommended confirmation with lab draw. Requested patient go to lab for INR check today to confirm. She verbalized understanding and stated will take patient right away. Educated patient to call [employer] to troubleshoot issues with INR meter. She also stated patient's appointment had been moved and will not have follow up in October. Informed her provider aware and to follow up as scheduled. Wife verbalized understanding and had no further questions. Keke Montelongo RN.

## 2022-10-20 NOTE — TELEPHONE ENCOUNTER
Received call from Odilon Wagner with [facility] reporting critical value INR of 6.0. Sent message to LVAD nurses.

## 2022-10-21 ENCOUNTER — TELEPHONE ANTICOAG (OUTPATIENT)
Dept: CARDIOLOGY CLINIC | Age: 77
End: 2022-10-21

## 2022-10-24 ENCOUNTER — TELEPHONE ANTICOAG (OUTPATIENT)
Dept: CARDIOLOGY CLINIC | Age: 77
End: 2022-10-24

## 2022-10-24 LAB — INR, EXTERNAL: 2.5

## 2022-10-28 ENCOUNTER — TELEPHONE (OUTPATIENT)
Dept: CARDIOLOGY CLINIC | Age: 77
End: 2022-10-28

## 2022-10-28 DIAGNOSIS — I50.22 CHRONIC SYSTOLIC HEART FAILURE (HCC): ICD-10-CM

## 2022-10-28 RX ORDER — EPLERENONE 25 MG/1
25 TABLET, FILM COATED ORAL DAILY
Qty: 90 TABLET | Refills: 1 | Status: SHIPPED | OUTPATIENT
Start: 2022-10-28

## 2022-10-28 RX ORDER — FAMOTIDINE 20 MG/1
TABLET, FILM COATED ORAL
Qty: 60 TABLET | Refills: 2 | Status: SHIPPED | OUTPATIENT
Start: 2022-10-28

## 2022-10-28 NOTE — TELEPHONE ENCOUNTER
Telephone Call RE:  Lab Reminder      Outcome:     [x] Patient verbalizes understanding    [] Unable to reach   [] Left message              []     INR 10/31/22  Caroline Nathan

## 2022-10-31 ENCOUNTER — TELEPHONE ANTICOAG (OUTPATIENT)
Dept: CARDIOLOGY CLINIC | Age: 77
End: 2022-10-31

## 2022-10-31 DIAGNOSIS — I50.9 CHRONIC CONGESTIVE HEART FAILURE, UNSPECIFIED HEART FAILURE TYPE (HCC): ICD-10-CM

## 2022-10-31 DIAGNOSIS — R06.02 SOB (SHORTNESS OF BREATH): ICD-10-CM

## 2022-10-31 DIAGNOSIS — Z79.01 CHRONIC ANTICOAGULATION: ICD-10-CM

## 2022-10-31 DIAGNOSIS — I50.22 CHRONIC SYSTOLIC HEART FAILURE (HCC): Primary | ICD-10-CM

## 2022-10-31 DIAGNOSIS — Z95.811 LVAD (LEFT VENTRICULAR ASSIST DEVICE) PRESENT (HCC): ICD-10-CM

## 2022-10-31 LAB — INR, EXTERNAL: 2.4

## 2022-10-31 NOTE — PATIENT INSTRUCTIONS
Medication changes:  NONE today     Switch to Jardiance once finished with [place] as previously planned        Testing Ordered: You are scheduled for an Echocardiogram 12/1/2022 11:30 AM (Arrive by 11:00 AM). 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 Your urine should be clear and light yellow straw colored. Follow up in 1 month with Nicolas Hughes      For further patient and caregiver resources as well as access to online LVAD support groups visit [email or web address]       Please bring your daily sheets and medications to your next appointment. Please monitor your weights daily upon waking and after using the bathroom. Keep a written records of your weights and bring to your next appointment. If you have a weight gain of 3 or more pounds overnight OR 5 or more pounds in one week please contact our office. Thank you for allowing us the privilege of being a part of your healthcare team! Please do not hesitate to contact our office at [phone] with any questions or concerns. Virtual Heart Failure [address] invites you to learn more about heart failure and to share your questions, ideas, and experiences with others. Each month, the Heart Failure Support Group features a new educational topic and a guest speaker, followed by an interactive discussion. Our Heart Failure Nurse Navigator will moderate each session. You will be able to participate by phone, tablet or computer through [employer]. This support group takes place on the 3rd Thursday of each month from 6:00-7:30PM. All individuals living with heart failure and their caregivers are welcome to join.      If you are interested in participating, please contact us at [email or web address] and you will be sent the link to join the [employer].

## 2022-11-02 ENCOUNTER — OFFICE VISIT (OUTPATIENT)
Dept: INTERNAL MEDICINE CLINIC | Age: 77
End: 2022-11-02
Payer: MEDICARE

## 2022-11-02 VITALS
SYSTOLIC BLOOD PRESSURE: 107 MMHG | OXYGEN SATURATION: 99 % | DIASTOLIC BLOOD PRESSURE: 71 MMHG | TEMPERATURE: 98 F | RESPIRATION RATE: 16 BRPM | HEART RATE: 40 BPM | WEIGHT: 211.4 LBS | BODY MASS INDEX: 28.02 KG/M2 | HEIGHT: 73 IN

## 2022-11-02 DIAGNOSIS — Z76.89 ESTABLISHING CARE WITH NEW DOCTOR, ENCOUNTER FOR: Primary | ICD-10-CM

## 2022-11-02 DIAGNOSIS — I25.10 ATHEROSCLEROSIS OF NATIVE CORONARY ARTERY OF NATIVE HEART WITHOUT ANGINA PECTORIS: ICD-10-CM

## 2022-11-02 DIAGNOSIS — R63.0 POOR APPETITE: ICD-10-CM

## 2022-11-02 DIAGNOSIS — I50.20 HFREF (HEART FAILURE WITH REDUCED EJECTION FRACTION) (HCC): ICD-10-CM

## 2022-11-02 DIAGNOSIS — N40.0 BPH WITHOUT OBSTRUCTION/LOWER URINARY TRACT SYMPTOMS: ICD-10-CM

## 2022-11-02 DIAGNOSIS — Z85.118 HISTORY OF LUNG CANCER: ICD-10-CM

## 2022-11-02 DIAGNOSIS — E11.51 TYPE 2 DIABETES, CONTROLLED, WITH PERIPHERAL CIRCULATORY DISORDER (HCC): ICD-10-CM

## 2022-11-02 PROBLEM — I21.4 NSTEMI (NON-ST ELEVATED MYOCARDIAL INFARCTION) (HCC): Status: RESOLVED | Noted: 2021-05-27 | Resolved: 2022-11-02

## 2022-11-02 PROCEDURE — 99214 OFFICE O/P EST MOD 30 MIN: CPT | Performed by: STUDENT IN AN ORGANIZED HEALTH CARE EDUCATION/TRAINING PROGRAM

## 2022-11-02 RX ORDER — DOCUSATE SODIUM 100 MG/1
100 CAPSULE, LIQUID FILLED ORAL DAILY
COMMUNITY

## 2022-11-02 RX ORDER — BUMETANIDE 2 MG/1
1 TABLET ORAL DAILY
COMMUNITY

## 2022-11-02 NOTE — ASSESSMENT & PLAN NOTE
Controlled, cont meds per advanced HF: bumex, eplerenone, hydralazine, entresto, asa 81, Warfarin  Currently finishing up farxiga that he has on hand and then will switch to empagliflozin

## 2022-11-02 NOTE — ASSESSMENT & PLAN NOTE
Will message his advanced heart failure doc to clarify if he should be on statin, currently off. Spine appears normal, range of motion is not limited, no muscle or joint tenderness. 5/5 strength UE

## 2022-11-02 NOTE — PROGRESS NOTES
Kerrie Carrion is a 69y.o. year old male who is a new patient to me today (11/02/22). He was previous followed by Dr Eliazar Ann. Assessment & Plan:   1. Establishing care with new doctor, encounter for  2. Type 2 diabetes, controlled, with peripheral circulatory disorder [facility])  Assessment & Plan:   Well controlled based on last A1c. Will check A1c next visit after he switches SGLT2 inhibitors. Cont metformin. Requested records from optho. 3. HFrEF (heart failure with reduced ejection fraction) ([facility])  Assessment & Plan:   Controlled, cont meds per advanced HF: bumex, eplerenone, hydralazine, entresto, asa 81, Warfarin  Currently finishing up farxiga that he has on hand and then will switch to empagliflozin  4. Poor appetite  Comments:  resolved. he was on mirtazapine for this. He has gained 20lbs. will stop mirtazapine and monitor  5. Atherosclerosis of native coronary artery of native heart without angina pectoris  Assessment & Plan: Will message his advanced heart failure doc to clarify if he should be on statin, currently off.   6. BPH without obstruction/lower urinary tract symptoms  Assessment & Plan:   Denies sensation of incomplete emptying. Urinary frequency due to diuretic use. Cont flomax. 7. History of lung cancer  Assessment & Plan: Will request records from onc to clarify his diagnosis and plans for future management       Health Maintenance   Flu vaccine: 10/31/22  COVID vaccine: discussed bivalent booster   Tetanus vaccine: 10/2019  Shingles vaccine: due, discussed   Pneumonia vaccine: up to date  Colon cancer screening: aged out  PSA: aged out  Hep C: 2021  Lipid: 6/2022  DM: 6/2022  Healthcare decision maker: wife  ACP:      RTC: 6 mo DM, foot exam    Subjective:   Lawson Ramírez was seen today for Establish Care    DM  Meds: metformin 500mg BID, SGLT2i  Eye: Dr Agustina Causey, went last month   Foot: intermittent numbness/ tingling in the entire foot.  Dr Bernie Neves for podiatry   Statin: not on statin    HF, CAD  Cardiac arrest 2013  4V CABG 2012  LVAD 4/2022  ICD upgrade 10/2021  - cardiology Dr Anthony Herbert  - bumex, farxiga --> empagliflozin, eplerenone, hydralazine, entresto  - asa 81  - Warfarin    BPH - Flomax  - Frequent urination, notes this on bumex but it is improved since starting    Lung cancer-  [facility] heme/onc, got radiation and they are watching some spots. Mirtazapine - started on this during his hospitalization for appetite. He feels his appetite is good now and he has gained the weight he was advised to gain after the hospitalization. GERD - taking famotidine    SRUTHI - wears CPAP 4 nights/week, falls asleep without it. Gets it through the [facility].  - used to go to [facility] for podiatry, no longer going there for this. Dr Zulema Lewis at the [facility] is his PCP, sees annually. Review of Systems   All other systems reviewed and are negative. PMHx    Patient Active Problem List   Diagnosis Code    Calculus of kidney N20.0    Coronary atherosclerosis of native coronary artery I25.10    Benign neoplasm of colon D12.6    Unspecified sleep apnea G47.30    Reflux esophagitis K21.00    Encounter for long-term (current) use of other medications Z79.899    Insomnia, unspecified G47.00    Cardiac arrest ([facility] [address] I46.9    Hematuria, gross R31.0    BPH without obstruction/lower urinary tract symptoms N40.0    Proteinuria R80.9    Mixed hyperlipidemia E78.2    Type 2 diabetes, controlled, with peripheral circulatory disorder (HCC) E11.51    Active advance directive on file Z78.9    HFrEF (heart failure with reduced ejection fraction) ([facility] I50.20    S/P ventricular assist device [facility]) Z95.811       Prior to Admission medications    Medication Sig Start Date End Date Taking? Authorizing Provider   bumetanide (BUMEX) 2 mg tablet Take 1 mg by mouth daily. Yes Provider, Historical   dapagliflozin (Farxiga) 10 mg tab tablet Take  by mouth daily.    Yes Provider, Historical   docusate sodium (COLACE) 100 mg capsule Take 100 mg by mouth daily. Yes Provider, Historical   eplerenone (INSPRA) 25 mg tablet TAKE 1 TABLET BY MOUTH DAILY 10/28/22  Yes Tim JACOBSON NP   famotidine (PEPCID) 20 mg tablet TAKE 1 TABLET BY MOUTH EVERY 12 HOURS 10/28/22  Yes Danica Stewart NP   empagliflozin (JARDIANCE) 25 mg tablet Take 0.5 Tablets by mouth daily. 9/23/22  Yes Ginny Stewart NP   sacubitriL-valsartan Renita Ades) 24-26 mg tablet Take 1 Tablet by mouth two (2) times a day. 8/12/22  Yes Roldan Green NP   magnesium oxide (MAG-OX) 400 mg tablet TAKE 1 TABLET BY MOUTH DAILY 8/2/22  Yes Danica Stewart NP   hydrALAZINE (APRESOLINE) 10 mg tablet Take 1 Tablet by mouth three (3) times daily. 7/18/22  Yes Danica Stewart NP   mirtazapine (REMERON) 7.5 mg tablet Take 1 Tablet by mouth nightly. 6/27/22  Yes Indigo Gustafson MD   bzdnpvsoo-CM-jbbhmsyv-guaifen (Mucinex Fast-Max Cold-Flu) 10- mg/20 mL liqd Take 20 mL by mouth every four (4) hours as needed for Cough. Take 20 mL by mouth every 4 hours as needed for cough   Yes Provider, Historical   tamsulosin (FLOMAX) 0.4 mg capsule Take 1 Capsule by mouth daily. 5/10/22  Yes Maira Paiz NP   warfarin (COUMADIN) 2.5 mg tablet Take 2 Tablets by mouth daily. 5/6/22  Yes Maira Paiz NP   potassium chloride SR (K-TAB) 20 mEq tablet Take 2 Tablets by mouth two (2) times a day. 3/9/22  Yes Maira Paiz NP   metFORMIN (GLUCOPHAGE) 500 mg tablet Take 1 Tablet by mouth two (2) times daily (with meals). 1/11/22  Yes Indigo Gustafson MD   acetaminophen (TYLENOL) 500 mg tablet Take 500 mg by mouth every six (6) hours as needed for Fever or Pain. Yes Provider, Historical   aspirin delayed-release 81 mg tablet Take 81 mg by mouth daily. Yes Provider, Historical   bumetanide (BUMEX) 1 mg tablet Take 0.5 Tablets by mouth daily. 9/27/22 11/2/22  Felix Matthews MD   melatonin 5 mg cap capsule Take 5 mg by mouth nightly.   Patient not taking: No sig reported  11/2/22  Provider, Historical       The following sections were reviewed & updated as appropriate: Problem List, Allergies, PMH, PSH, FH, and SH. Objective:   Visit Vitals  /71   Pulse (!) 40   Temp 98 °F (36.7 °C) (Temporal)   Resp 16   Ht 6' 1\" (1.854 m)   Wt 211 lb 6.4 oz (95.9 kg)   SpO2 99%   BMI 27.89 kg/m²       Physical Exam  Constitutional:       General: He is not in acute distress. Eyes:      Extraocular Movements: Extraocular movements intact. Conjunctiva/sclera: Conjunctivae normal.   Cardiovascular:      Heart sounds: Murmur (continuous, LVAD) heard. Pulmonary:      Effort: Pulmonary effort is normal. No respiratory distress. Abdominal:      General: Bowel sounds are normal.      Palpations: Abdomen is soft. Musculoskeletal:      Right lower leg: No edema. Left lower leg: No edema. Neurological:      General: No focal deficit present. Mental Status: He is alert.    Psychiatric:         Mood and Affect: Mood normal.         Behavior: Behavior normal.            Latanya Montalvo MD

## 2022-11-02 NOTE — ASSESSMENT & PLAN NOTE
Well controlled based on last A1c. Will check A1c next visit after he switches SGLT2 inhibitors. Cont metformin. Requested records from [email or web address].

## 2022-11-08 ENCOUNTER — TELEPHONE (OUTPATIENT)
Dept: CARDIOLOGY CLINIC | Age: 77
End: 2022-11-08

## 2022-11-08 NOTE — TELEPHONE ENCOUNTER
Telephone Call RE:  Appointment reminder     Outcome:     [x] Patient confirmed appointment   [] Patient rescheduled appointment for    [] Unable to reach  [] Left message              [] Other:           Ambrocio Morocho

## 2022-11-10 ENCOUNTER — OFFICE VISIT (OUTPATIENT)
Dept: CARDIOLOGY CLINIC | Age: 77
End: 2022-11-10
Payer: MEDICARE

## 2022-11-10 VITALS
OXYGEN SATURATION: 97 % | RESPIRATION RATE: 20 BRPM | HEART RATE: 40 BPM | BODY MASS INDEX: 27.04 KG/M2 | SYSTOLIC BLOOD PRESSURE: 80 MMHG | WEIGHT: 204 LBS | HEIGHT: 73 IN | TEMPERATURE: 96.6 F

## 2022-11-10 DIAGNOSIS — Z95.811 LVAD (LEFT VENTRICULAR ASSIST DEVICE) PRESENT (HCC): Primary | ICD-10-CM

## 2022-11-10 DIAGNOSIS — I50.22 CHRONIC SYSTOLIC CONGESTIVE HEART FAILURE (HCC): ICD-10-CM

## 2022-11-10 PROCEDURE — 99214 OFFICE O/P EST MOD 30 MIN: CPT | Performed by: NURSE PRACTITIONER

## 2022-11-10 PROCEDURE — G8432 DEP SCR NOT DOC, RNG: HCPCS | Performed by: NURSE PRACTITIONER

## 2022-11-10 PROCEDURE — 1123F ACP DISCUSS/DSCN MKR DOCD: CPT | Performed by: NURSE PRACTITIONER

## 2022-11-10 PROCEDURE — G8427 DOCREV CUR MEDS BY ELIG CLIN: HCPCS | Performed by: NURSE PRACTITIONER

## 2022-11-10 PROCEDURE — G8536 NO DOC ELDER MAL SCRN: HCPCS | Performed by: NURSE PRACTITIONER

## 2022-11-10 PROCEDURE — 93750 INTERROGATION VAD IN PERSON: CPT | Performed by: NURSE PRACTITIONER

## 2022-11-10 PROCEDURE — G8417 CALC BMI ABV UP PARAM F/U: HCPCS | Performed by: NURSE PRACTITIONER

## 2022-11-10 PROCEDURE — 1101F PT FALLS ASSESS-DOCD LE1/YR: CPT | Performed by: NURSE PRACTITIONER

## 2022-11-10 NOTE — PROGRESS NOTES
Mr. Tomas Neves was seen today in clinic for a visit with Christie Hanks NP     Patient denied s/s of driveline infection or driveline trauma (including  drops). Denies LVAD alarms at home. Site clean, dry, intact. LVAD interrogation completed in clinic, results reported to provider. See flow sheet for details. The following education was provided:    -Upcoming echo appointment    -Driveline precautions     Patient verbalized understanding    Provider notified of the above. Provider orders received via [facility] and entered as ordered.

## 2022-11-10 NOTE — PROGRESS NOTES
[facility] in [place], [address] Note    Patient name: Konrad Saenz  Patient : 1945  Patient MRN: [id number]  Date of service: 11/10/22    Primary care physician: Evie Martin MD    LVAD cardiologist: Kathy Zaragoza MD    CHIEF COMPLAINT:  Postoperative care    PLAN OF CARE:  69 y.o. male with severe ischemic cardiomyopathy, LVEF 15-20% s/p HM3 LVAD implant as DT on 22 by Dr. Alexis Naranjo c/b intraoperative bleeding requiring multiple blood products and subsequent RV failure with severe TR requiring prolonged dobutamine wean and long term CT support, followed by left thoracentesis 22 by IR; 1.4L removed and no further complications; doing well      RECOMMENDATIONS:  Continue speed 4800rpms, low speed 4400  No BB due to RV failure   Continue Entresto 24/26mg BID; not orthostatic  Cont Eplerenone 25mg daily  Continue bumex 0.5mg daily, can take additional 0.5mg prn for weight gain  Currently on Farxiga 5mg daily; plan to change to Jardiance after current bottle done, d/t formulary at the [address] [place] [address]  Continue hydralazine 10mg TID  Continue warfarin, goal INR 2-3 see tracker for details   ASA 81mg daily  Continue Flomax 0.4mg daily  Cont pepcid  Routine LVAD labs   Echo scheduled for Dec  Driveline dressing changes 3 times a week  CPAP qHS   Advanced care plan forms on file   Will have CT chest as follow up on lung nodule for heme/onc  Return to [facility] in 1 month        IMPRESSION:  S/p LVAD implant as DT  Post-op RV failure requiring prolonged use of dobutamine  Chronic systolic heart failure  Stage D, NYHA class IV symptoms  Non-ischemic cardiomyopathy, LVEF 20% with LVEDD 6.2  RV dysfunction  At risk of sudden cardiac death  Recent cardiac arrest   S/p ICD (2013, [employer] followed by [facility]);  New implant on 10/21/2021 [employer]  Coronary artery disease  S/p multiple interventions  S/p 4V CABG (8/2012)  [facility] (7/2019) severe stenosis of LIMA to LAD anastomosis site, SVG graft to OM is occluded, SVG graft to RCA 40-50%, LAD occluded proximally, severe proximal LCx, RCA is the long . PET (6/2019) EF 24% with anterior lateral and inferior reversible defect  Cardiac risk factors  HTN  HL  DM2  SRUTHI on CPAP  MIld carotid stenosis  Anemia, microcytic  Iron deficiency  DVT with filter  Pulmonary nodules   Dysphagia      LIFE GOALS:  Patient life goals discussed. CARDIAC IMAGING:  Echo (6/28/22): LVEF 25 - 30%. Mildly increased wall thickness. Severe global hypokinesis present     Echo (6/2/22) LVEF 20-25%, mild MR, mod to severe TR, LVIDd 4.9cm, RVIDd 6cm, TAPSE 0.9cm      Echo (5/4/22) LVEF 15-20%, mod TR, LVIDd 5.3 cm, TAPSE 0.9 cm      Echo (4/26/22) - LVIDd 4.9 cm, TAPSE 0.5 cm,      Echo (4/18/22)- LVEF 15-20%, severe TR, TAPSE 0.5cm, RVIDd 6cm      Echo (4/8/22): LVEF 10-15%. Severe global hypokinesis present. 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 Echo (3/2/22): LVEF 10-15%; Mod-severe MR      Echo (11/23/21): LVEF 15-20%; Mod-severe, MR, mod-TR     Echo (5/20/21): LVEF is 20 - 25%. Severely and globally reduced systolic function. Severe (grade 4) left ventricular diastolic dysfunction. [facility] (5/24/21) Native Coronaries: LM - moderate to severe distal disease 50%. % prox occluded (), heavily calcified, LCx: 80% proximal stenosis. OM1 is  (seen filling faintly via collaterals). OM2 99% stenosis. RCA: 100% proximal occluded. LIMA to LAD: patent, supplies only a diagonal branch (probably D2). The LAD distal to the bifurcation is 100% occluded () and fills via right to left collaterals off the SVG to RCA. SVG to R-PDA: patent with moderate diffuse irregularities but no obstructive disease in the graft. No appealing interventional targets.      [address] Report 9/27/2022 5/4/2022 5/2/2022 11/12/2021 7/6/2021 5/20/2021 (PRE) HR 70 82 82 88 98 74   (PRE) O2 Sat 96 - 98 100 - 99   (POST) HR 79 91 88 102 - 81   (POST) O2 Sat 100 - 98 99 98% on Ra 99   Distance in Meters 336.8 191.41 181.36 386. 62 - 1158.24            BRIEF HISTORY OF PRESENT ILLNESS:  Aaron Canseco is a 69 y.o. male with h/o HTN, HL, DM2, SRUTHI on CPAP, chronic systolic heart failure, stage D, NYHA class IV symptoms, non-ischemic cardiomyopathy, LVEF 20% with LVEDD 6.2, RV dysfunciton, TAPSE 1.22, TBili 1.5 likely from cardiac congestion, recent cardiac arrest s/p ICD (1/2013, [address] followed by [facility]), coronary artery disease s/p multiple interventions s/p 4V CABG (8/2012), [facility] (7/2019) severe stenosis of LIMA to LAD anastomosis site, SVG graft to OM is occluded, SVG graft to RCA 40-50%, LAD occluded proximally, severe proximal LCx, RCA is the long . PET (6/2019) EF 24% with anterior lateral and inferior reversible defect. Anemia, microcytic with iron deficiency, DVT with filter. Patient was referred to [facility] by Dr. Ryan Montgomery in 2021 for evaluation of his candidacy for advanced therapies. Patient agreed to inpatient initiation of palliative infusion of chronic inotropes and evaluation for LVAD-DT. Patient was admitted 5/2-5/25/21. Patient was started on milrinone infusion and had first part of LVAD evaluation completed. Right and left heart cath on 5/24/21 showed severe diffuse native vessel CAD, with patent grafts (LIMA to D2, SVG to RCA). Pt was found to have pulmonary nodule during workup. Antiplatelet therapy was held during hospitalization and after discharge due to anticipated pulmonary nodule biopsy, bone marrow biopsy and EGD/C-Scope as part of LVAD workup. Pt found to have adenocarcinoma of the lung, and so LVAD was deferred pending treatment. Patient completed radiation treatment for adenocarcinoma of the lung. Hem-onc cleared patient for VAD implant with 90% 2 year prognosis.       He presented to [facility] on 4/3 for admission for planned LVAD implant which he underwent on 4/5/22. Was re-do sternotomy, bleeding intra-op, required cryo, k-centra, FFP, Plt, and cellsaver in OR. Had RV dysfunction noted intra-op; requiring lower VAD speed and dual inotrope support. 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 He also had issues with significant CT drainage requiring prolonged placement. He was eventually discharged home on POD 29 with GDMT (no BB due to RV failure) with [facility] SN/PT and OT. He had persistent left pleural effusion and underwent thoracentesis in IR on 6/9/22 with 1.4L removed. INTERVAL HISTORY:  Today, patient presents for routine clinic visit. he reports doing really well. He still has some BRAND, but is able to remain active and walk more, do things around the house without issue. He reports a great appetite, no LE edmea, no orthopnea. He denies issues with his driveline site and no alarms on his LVAD. he is compliant with fluid restriction and taking medications as prescribed. Patient manages his own medications. REVIEW OF SYSTEMS:  Review of Systems   Constitutional:  Negative for chills, fever, malaise/fatigue and weight loss. Respiratory:  Negative for cough and shortness of breath. Cardiovascular:  Negative for chest pain, palpitations, orthopnea and leg swelling. Gastrointestinal:  Negative for abdominal pain, heartburn, nausea and vomiting. Neurological:  Negative for dizziness and weakness.           PHYSICAL EXAM:  Visit Vitals  BP (!) 80/0 (BP 1 Location: Right upper arm, BP Patient Position: Sitting, BP Cuff Size: Adult) Comment: MAP   Pulse (!) 40   Temp (!) 96.6 °F (35.9 °C) (Oral)   Resp 20   Ht 6' 1\" (1.854 m)   Wt 204 lb (92.5 kg)   SpO2 97%   BMI 26.91 kg/m²      LVAD   Pump Speed (RPM): 4900  Pump Flow (LPM): 3.8  MAP: 80  PI (Pulsitility Index): 7.6  Power: 3.3  Testing  Alarms Reviewed: Yes  Back up SC speed: 4800  Back up Low Speed Limit: 4400  Emergency Equipment with Patient?: Yes      Physical Exam  Vitals reviewed. Constitutional:       General: He is not in acute distress. Appearance: Normal appearance. Cardiovascular:      Rate and Rhythm: Normal rate and regular rhythm. Heart sounds: Normal heart sounds. Comments: LVAD Hum noted on auscultation  Pulmonary:      Effort: Pulmonary effort is normal. No respiratory distress. Abdominal:      General: Bowel sounds are normal. There is no distension. Palpations: Abdomen is soft. Tenderness: There is no abdominal tenderness. Musculoskeletal:      Right lower leg: No edema. Left lower leg: No edema. Skin:     General: Skin is warm and dry. Capillary Refill: Capillary refill takes less than 2 seconds. Neurological:      General: No focal deficit present. Mental Status: He is alert and oriented to person, place, and time. Psychiatric:         Mood and Affect: Mood normal.         Behavior: Behavior normal.         Thought Content: Thought content normal.         Judgment: Judgment normal.          PAST MEDICAL HISTORY:  Past Medical History:   Diagnosis Date    CAD (coronary artery disease) '90  '97, '13 x 2    [place] [facility]    Calculus of kidney     Cardiac arrest ([facility] 2/4/2013    Colonic polyps     Congestive heart failure, unspecified     Diabetes ([facility] [address]     Gastritis     Hypercholesterolemia     Sleep apnea        PAST SURGICAL HISTORY:  Past Surgical History:   Procedure Laterality Date    COLONOSCOPY  04/06/2011    16, due 21    COLONOSCOPY N/A 3/2/2022    COLONOSCOPY    :- performed by Dorinda Andrew MD at [facility] ENDOSCOPY    ENDOSCOPY, COLON, DIAGNOSTIC      11, due 16    HX CORONARY ARTERY BYPASS GRAFT  8/22/12    x 4 vessel by S.  James    HX HEENT      LASIK    HX PACEMAKER PLACEMENT  1/30/13    MI CARDIAC SURG PROCEDURE UNLIST  2012    x 4 vessels       FAMILY HISTORY:  Family History   Problem Relation Age of Onset    Heart Disease Mother MI    Heart Disease Father         CAD & PVD    Lung Disease Father     Cancer Father         lung    Diabetes Maternal Grandmother     Heart Disease Other         CAD    Stroke Sister        SOCIAL HISTORY:  Social History     Socioeconomic History    Marital status:    Tobacco Use    Smoking status: Never    Smokeless tobacco: Never   Vaping Use    Vaping Use: Never used   Substance and Sexual Activity    Alcohol use: Yes     Alcohol/week: 0.0 standard drinks     Comment:  VERY RARE    Drug use: No    Sexual activity: Not Currently       LABORATORY RESULTS:  Labs Latest Ref Rng & Units 10/6/2022   WBC 3.4 - 10.8 x10E3/uL 3.7   RBC 4.14 - 5.80 x10E6/uL 5.69   Hemoglobin 13.0 - 17.7 g/dL 13.2   Hematocrit 37.5 - 51.0 % 42.8   MCV 79 - 97 fL 75(L)   Platelets 050 - 590 I20R5/   Albumin 3.7 - 4.7 g/dL 3.9   Calcium 8.6 - 10.2 mg/dL 9.1   Glucose 70 - 99 mg/dL 100(H)   BUN 8 - 27 mg/dL 24   Creatinine 0.76 - 1.27 mg/dL 1.13   Sodium 134 - 144 mmol/L 138   Potassium 3.5 - 5.2 mmol/L 4.8    - 224 IU/L 185   Some recent data might be hidden       ALLERGY:  Allergies   Allergen Reactions    Oxycodone Anaphylaxis    Pcn [Penicillins] Hives        CURRENT MEDICATIONS:    Current Outpatient Medications:     bumetanide (BUMEX) 2 mg tablet, Take 1 mg by mouth daily. , Disp: , Rfl:     dapagliflozin (FARXIGA) 10 mg tab tablet, Take 5 mg by mouth daily. Patient states that he is taking 1/2 tab qd, Disp: , Rfl:     docusate sodium (COLACE) 100 mg capsule, Take 100 mg by mouth daily. , Disp: , Rfl:     eplerenone (INSPRA) 25 mg tablet, TAKE 1 TABLET BY MOUTH DAILY, Disp: 90 Tablet, Rfl: 1    famotidine (PEPCID) 20 mg tablet, TAKE 1 TABLET BY MOUTH EVERY 12 HOURS (Patient taking differently: as needed.), Disp: 60 Tablet, Rfl: 2    sacubitriL-valsartan (Entresto) 24-26 mg tablet, Take 1 Tablet by mouth two (2) times a day., Disp: 60 Tablet, Rfl: 11    magnesium oxide (MAG-OX) 400 mg tablet, TAKE 1 TABLET BY MOUTH DAILY, Disp: 30 Tablet, Rfl: 3    hydrALAZINE (APRESOLINE) 10 mg tablet, Take 1 Tablet by mouth three (3) times daily. , Disp: 290 Tablet, Rfl: 3    xgbdsauyv-JY-bqhsjoid-guaifen (Mucinex Fast-Max Cold-Flu) 10- mg/20 mL liqd, Take 20 mL by mouth every four (4) hours as needed for Cough. Take 20 mL by mouth every 4 hours as needed for cough, Disp: , Rfl:     tamsulosin (FLOMAX) 0.4 mg capsule, Take 1 Capsule by mouth daily. , Disp: 30 Capsule, Rfl: 2    warfarin (COUMADIN) 2.5 mg tablet, Take 2 Tablets by mouth daily. , Disp: 90 Tablet, Rfl: 2    potassium chloride SR (K-TAB) 20 mEq tablet, Take 2 Tablets by mouth two (2) times a day., Disp: 360 Tablet, Rfl: 1    metFORMIN (GLUCOPHAGE) 500 mg tablet, Take 1 Tablet by mouth two (2) times daily (with meals). , Disp: 180 Tablet, Rfl: 3    acetaminophen (TYLENOL) 500 mg tablet, Take 500 mg by mouth every six (6) hours as needed for Fever or Pain., Disp: , Rfl:     aspirin delayed-release 81 mg tablet, Take 81 mg by mouth daily. , Disp: , Rfl:     empagliflozin (JARDIANCE) 25 mg tablet, Take 0.5 Tablets by mouth daily. (Patient not taking: Reported on 11/10/2022), Disp: 45 Tablet, Rfl: 3    PATIENT CARE TEAM:  Patient Care Team:  Gisel Luna MD as PCP - General (Internal Medicine Physician)  Gisel Luna MD as PCP - REHABILITATION [facility] Provider  Rob Carter MD as Physician (Cardiovascular Disease Physician)  Douglas Poole MD as Physician (Gastroenterology)  Kiera Bailey MD as Physician (Orthopedic Surgery)  Kendra Degroot MD as Physician (Ophthalmology)  Aleida Rueda MD (Cardiovascular Disease Physician)  Laith Pryor MD (Cardiovascular Disease Physician)     Thank you for your referral and allowing me to participate in this patient's care.     Jason McLaren Northern Michiganjuan ramon, NP  [address], [address]  [facility], [address] [place] [address]  Office [phone]  Fax [phone]

## 2022-11-11 ENCOUNTER — TELEPHONE (OUTPATIENT)
Dept: CARDIOLOGY CLINIC | Age: 77
End: 2022-11-11

## 2022-11-11 DIAGNOSIS — I50.22 CHRONIC SYSTOLIC HEART FAILURE (HCC): ICD-10-CM

## 2022-11-11 DIAGNOSIS — R06.02 SOB (SHORTNESS OF BREATH): ICD-10-CM

## 2022-11-11 DIAGNOSIS — Z95.811 LVAD (LEFT VENTRICULAR ASSIST DEVICE) PRESENT (HCC): Primary | ICD-10-CM

## 2022-11-11 DIAGNOSIS — Z79.01 CHRONIC ANTICOAGULATION: ICD-10-CM

## 2022-11-11 NOTE — TELEPHONE ENCOUNTER
Contacted patient via phone for lab reminder.     Telephone Call RE:  Lab Reminder      Outcome:     [x] Patient verbalizes understanding    [] Unable to reach   [] Left message              [x]   Labs faxed to desired lab    Jeet Gilliland , [address]

## 2022-11-12 PROBLEM — D47.2 MGUS (MONOCLONAL GAMMOPATHY OF UNKNOWN SIGNIFICANCE): Status: ACTIVE | Noted: 2022-11-12

## 2022-11-16 ENCOUNTER — TELEPHONE (OUTPATIENT)
Dept: CARDIOLOGY CLINIC | Age: 77
End: 2022-11-16

## 2022-11-18 ENCOUNTER — TELEPHONE ANTICOAG (OUTPATIENT)
Dept: CARDIOLOGY CLINIC | Age: 77
End: 2022-11-18

## 2022-11-18 LAB
ALBUMIN SERPL-MCNC: 4.4 G/DL (ref 3.7–4.7)
ALBUMIN/GLOB SERPL: 1.5 {RATIO} (ref 1.2–2.2)
ALP SERPL-CCNC: 138 IU/L (ref 44–121)
ALT SERPL-CCNC: 8 IU/L (ref 0–44)
AST SERPL-CCNC: 40 IU/L (ref 0–40)
BILIRUB SERPL-MCNC: 0.6 MG/DL (ref 0–1.2)
BUN SERPL-MCNC: 21 MG/DL (ref 8–27)
BUN/CREAT SERPL: 20 (ref 10–24)
CALCIUM SERPL-MCNC: 9.2 MG/DL (ref 8.6–10.2)
CHLORIDE SERPL-SCNC: 103 MMOL/L (ref 96–106)
CO2 SERPL-SCNC: 24 MMOL/L (ref 20–29)
CREAT SERPL-MCNC: 1.05 MG/DL (ref 0.76–1.27)
EGFR: 74 ML/MIN/1.73
ERYTHROCYTE [DISTWIDTH] IN BLOOD BY AUTOMATED COUNT: 19.4 % (ref 11.6–15.4)
GLOBULIN SER CALC-MCNC: 3 G/DL (ref 1.5–4.5)
GLUCOSE SERPL-MCNC: 88 MG/DL (ref 70–99)
HCT VFR BLD AUTO: 44.6 % (ref 37.5–51)
HGB BLD-MCNC: 13.5 G/DL (ref 13–17.7)
INR PPP: 2 (ref 0.9–1.2)
LDH SERPL-CCNC: 179 IU/L (ref 121–224)
MAGNESIUM SERPL-MCNC: 2 MG/DL (ref 1.6–2.3)
MCH RBC QN AUTO: 23.4 PG (ref 26.6–33)
MCHC RBC AUTO-ENTMCNC: 30.3 G/DL (ref 31.5–35.7)
MCV RBC AUTO: 77 FL (ref 79–97)
NT-PROBNP SERPL-MCNC: 1033 PG/ML (ref 0–486)
PLATELET # BLD AUTO: 212 X10E3/UL (ref 150–450)
POTASSIUM SERPL-SCNC: 4.6 MMOL/L (ref 3.5–5.2)
PROT SERPL-MCNC: 7.4 G/DL (ref 6–8.5)
PROTHROMBIN TIME: 20.8 SEC (ref 9.1–12)
RBC # BLD AUTO: 5.77 X10E6/UL (ref 4.14–5.8)
SODIUM SERPL-SCNC: 141 MMOL/L (ref 134–144)
WBC # BLD AUTO: 4.2 X10E3/UL (ref 3.4–10.8)

## 2022-11-21 ENCOUNTER — TELEPHONE (OUTPATIENT)
Dept: CARDIOLOGY CLINIC | Age: 77
End: 2022-11-21

## 2022-11-23 ENCOUNTER — TELEPHONE (OUTPATIENT)
Dept: CARDIOLOGY CLINIC | Age: 77
End: 2022-11-23

## 2022-11-23 NOTE — TELEPHONE ENCOUNTER
Contacted patient via phone for INR reminder.     Telephone Call RE:  INR Reminder      Outcome:     [] Patient verbalizes understanding    [x] Unable to reach   [x] Left message              []     Left a message for patient in reference to INR reminder for Friday    Nelson Wolfe

## 2022-11-25 ENCOUNTER — TELEPHONE (OUTPATIENT)
Dept: CARDIOLOGY CLINIC | Age: 77
End: 2022-11-25

## 2022-11-25 NOTE — TELEPHONE ENCOUNTER
Called patient regarding INR as office is closing early due to holiday. Wife stated they are waiting on test strips which have been ordered. Advised patient we can send order to [facility] Monday if no test strips received by then. Verbalized understanding.

## 2022-11-28 ENCOUNTER — TELEPHONE (OUTPATIENT)
Dept: CARDIOLOGY CLINIC | Age: 77
End: 2022-11-28

## 2022-11-28 DIAGNOSIS — Z79.01 CHRONIC ANTICOAGULATION: Primary | ICD-10-CM

## 2022-11-28 DIAGNOSIS — Z95.811 LVAD (LEFT VENTRICULAR ASSIST DEVICE) PRESENT (HCC): ICD-10-CM

## 2022-11-28 NOTE — TELEPHONE ENCOUNTER
Patient's wife called inquiring about INR due. Returned voicemail to advise patient INR order sent to lab. [facility]

## 2022-11-29 ENCOUNTER — TELEPHONE ANTICOAG (OUTPATIENT)
Dept: CARDIOLOGY CLINIC | Age: 77
End: 2022-11-29

## 2022-11-29 LAB
INR PPP: 2.1 (ref 0.9–1.2)
PROTHROMBIN TIME: 21.4 SEC (ref 9.1–12)

## 2022-12-06 ENCOUNTER — HOSPITAL ENCOUNTER (OUTPATIENT)
Dept: NON INVASIVE DIAGNOSTICS | Age: 77
Discharge: HOME | End: 2022-12-06
Attending: INTERNAL MEDICINE
Payer: MEDICARE

## 2022-12-06 ENCOUNTER — OFFICE VISIT (OUTPATIENT)
Dept: CARDIOLOGY CLINIC | Age: 77
End: 2022-12-06
Payer: MEDICARE

## 2022-12-06 VITALS
TEMPERATURE: 97.7 F | HEIGHT: 73 IN | HEART RATE: 52 BPM | WEIGHT: 201 LBS | SYSTOLIC BLOOD PRESSURE: 88 MMHG | RESPIRATION RATE: 20 BRPM | BODY MASS INDEX: 26.64 KG/M2 | OXYGEN SATURATION: 96 %

## 2022-12-06 VITALS — BODY MASS INDEX: 26.65 KG/M2 | HEIGHT: 73 IN | SYSTOLIC BLOOD PRESSURE: 88 MMHG | WEIGHT: 201.06 LBS

## 2022-12-06 DIAGNOSIS — Z95.811 LVAD (LEFT VENTRICULAR ASSIST DEVICE) PRESENT (HCC): Primary | ICD-10-CM

## 2022-12-06 DIAGNOSIS — I50.9 CHRONIC CONGESTIVE HEART FAILURE, UNSPECIFIED HEART FAILURE TYPE (HCC): ICD-10-CM

## 2022-12-06 DIAGNOSIS — Z95.811 LVAD (LEFT VENTRICULAR ASSIST DEVICE) PRESENT (HCC): ICD-10-CM

## 2022-12-06 PROCEDURE — 99215 OFFICE O/P EST HI 40 MIN: CPT | Performed by: NURSE PRACTITIONER

## 2022-12-06 PROCEDURE — 1101F PT FALLS ASSESS-DOCD LE1/YR: CPT | Performed by: NURSE PRACTITIONER

## 2022-12-06 PROCEDURE — G8417 CALC BMI ABV UP PARAM F/U: HCPCS | Performed by: NURSE PRACTITIONER

## 2022-12-06 PROCEDURE — 93750 INTERROGATION VAD IN PERSON: CPT | Performed by: NURSE PRACTITIONER

## 2022-12-06 PROCEDURE — G8432 DEP SCR NOT DOC, RNG: HCPCS | Performed by: NURSE PRACTITIONER

## 2022-12-06 PROCEDURE — 1123F ACP DISCUSS/DSCN MKR DOCD: CPT | Performed by: NURSE PRACTITIONER

## 2022-12-06 PROCEDURE — G8536 NO DOC ELDER MAL SCRN: HCPCS | Performed by: NURSE PRACTITIONER

## 2022-12-06 PROCEDURE — 93306 TTE W/DOPPLER COMPLETE: CPT

## 2022-12-06 PROCEDURE — G8427 DOCREV CUR MEDS BY ELIG CLIN: HCPCS | Performed by: NURSE PRACTITIONER

## 2022-12-06 NOTE — PATIENT INSTRUCTIONS
Medication changes:    NONE today     Please take this to your pharmacy to notify them of the change in medications. Testing Ordered:    Due for INR check 12/12/2022. 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 Your urine should be clear and light yellow straw colored. Follow up in 1 month with Rudolph Mccloud      For further patient and caregiver resources as well as access to online LVAD support groups visit [email or web address]       Please bring your daily sheets and medications to your next appointment. Please monitor your weights daily upon waking and after using the bathroom. Keep a written records of your weights and bring to your next appointment. If you have a weight gain of 3 or more pounds overnight OR 5 or more pounds in one week please contact our office. Thank you for allowing us the privilege of being a part of your healthcare team! Please do not hesitate to contact our office at [phone] with any questions or concerns.

## 2022-12-06 NOTE — PROGRESS NOTES
Mr. Selina Oreilly was seen today in clinic for a visit with Portia Gonsales NP     Patient denied s/s of driveline infection or driveline trauma (including  drops). Denies LVAD alarms at home. Driveline inspected, no tears or kinks noted. LVAD interrogation completed in clinic, results reported to provider. See flow sheet for details. Patient educated on follow up, no medication changes today. Educated on importance of avoiding driveline trauma, importance of calling ASAP for driveline trauma.  Patient and spouse verbalized understanding

## 2022-12-06 NOTE — PROGRESS NOTES
[facility], [address] [place] [address] [address] Note    Patient name: Gayatri Calloway  Patient : 1945  Patient MRN: [id number]  Date of service: 22    Primary care physician: Jose Mayer MD    LVAD cardiologist: Merissa Franz MD    CHIEF COMPLAINT:  Chief Complaint   Patient presents with    Follow-up     LVAD       PLAN OF CARE:  69 y.o. male with severe ischemic cardiomyopathy, LVEF 15-20% s/p HM3 LVAD implant as DT on 22 by Dr. Jeffy Harding c/b intraoperative bleeding requiring multiple blood products and subsequent RV failure with severe TR requiring prolonged dobutamine wean and long term CT support, followed by left thoracentesis 22 by IR; 1.4L removed and no further complications; doing well      RECOMMENDATIONS:  Continue speed 4800rpms, low speed 4400  No BB due to RV failure   Continue Entresto 24/26mg BID; not orthostatic  Cont Eplerenone 25mg daily  Continue bumex 0.5mg daily  Continue Jardiance 5mg daily   Continue hydralazine 10mg TID  Continue warfarin, goal INR 2-3 see tracker for details   ASA 81mg daily  Continue Flomax 0.4mg daily  Cont pepcid  Routine LVAD labs scheduled for next week   Echo scheduled for today   Driveline dressing changes 3 times a week  CPAP qHS, encouraged to use nightly   [facility]  Advanced care plan forms on file   Repeat Chest CT annually for previous lung nodules, next in 2023  Up to date on Flu/PNA/COVID vaccines, scheduled for updated Shingles vaccine    Follow-up and Dispositions    Return in about 4 weeks (around 1/3/2023).            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 PET (6/2019) EF 24% with anterior lateral and inferior reversible defect  Cardiac risk factors  HTN  HL  DM2  SRUTHI on CPAP  MIld carotid stenosis  Anemia, microcytic  Iron deficiency  DVT with filter  Pulmonary nodules   Dysphagia      LIFE GOALS:  Patient life goals discussed. CARDIAC IMAGING:  Echo 12/6/22 pending   Echo (6/28/22): LVEF 25 - 30%. Mildly increased wall thickness. Severe global hypokinesis present     Echo (6/2/22) LVEF 20-25%, mild MR, mod to severe TR, LVIDd 4.9cm, RVIDd 6cm, TAPSE 0.9cm      Echo (5/4/22) LVEF 15-20%, mod TR, LVIDd 5.3 cm, TAPSE 0.9 cm      Echo (4/26/22) - LVIDd 4.9 cm, TAPSE 0.5 cm,      Echo (4/18/22)- LVEF 15-20%, severe TR, TAPSE 0.5cm, RVIDd 6cm      Echo (4/8/22): LVEF 10-15%. Severe global hypokinesis present. 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 Echo (3/2/22): LVEF 10-15%; Mod-severe MR      Echo (11/23/21): LVEF 15-20%; Mod-severe, MR, mod-TR     Echo (5/20/21): LVEF is 20 - 25%. Severely and globally reduced systolic function. Severe (grade 4) left ventricular diastolic dysfunction. [facility] (5/24/21) Native Coronaries: LM - moderate to severe distal disease 50%. % prox occluded (), heavily calcified, LCx: 80% proximal stenosis. OM1 is  (seen filling faintly via collaterals). OM2 99% stenosis. RCA: 100% proximal occluded. LIMA to LAD: patent, supplies only a diagonal branch (probably D2). The LAD distal to the bifurcation is 100% occluded () and fills via right to left collaterals off the SVG to RCA.  SVG to R-PDA: patent with moderate diffuse irregularities but no obstructive disease in the graft. No appealing interventional targets. 6 Min Walk Report 9/27/2022 5/4/2022 5/2/2022 11/12/2021 7/6/2021 5/20/2021   (PRE) HR 70 82 82 88 98 74   (PRE) O2 Sat 96 - 98 100 - 99   (POST) HR 79 91 88 102 - 81   (POST) O2 Sat 100 - 98 99 98% on Ra 99   Distance in Meters 336.8 191.41 181.36 386. 62 - 1158.24            BRIEF HISTORY OF PRESENT ILLNESS:  Shanelle Cornejo is a 69 y.o. male with h/o HTN, HL, DM2, SRUTHI on CPAP, chronic systolic heart failure, stage D, NYHA class IV symptoms, non-ischemic cardiomyopathy, LVEF 20% with LVEDD 6.2, RV dysfunciton, TAPSE 1.22, TBili 1.5 likely from cardiac congestion, recent cardiac arrest s/p ICD (1/2013, [address] followed by [facility]), coronary artery disease s/p multiple interventions s/p 4V CABG (8/2012), LHC (7/2019) severe stenosis of LIMA to LAD anastomosis site, SVG graft to OM is occluded, SVG graft to RCA 40-50%, LAD occluded proximally, severe proximal LCx, RCA is the long . PET (6/2019) EF 24% with anterior lateral and inferior reversible defect. Anemia, microcytic with iron deficiency, DVT with filter. Patient was referred to [facility] by Dr. Jeremy Driscoll in 2021 for evaluation of his candidacy for advanced therapies. Patient agreed to inpatient initiation of palliative infusion of chronic inotropes and evaluation for LVAD-DT. Patient was admitted 5/2-5/25/21. Patient was started on milrinone infusion and had first part of LVAD evaluation completed. Right and left heart cath on 5/24/21 showed severe diffuse native vessel CAD, with patent grafts (LIMA to D2, SVG to RCA). Pt was found to have pulmonary nodule during workup. Antiplatelet therapy was held during hospitalization and after discharge due to anticipated pulmonary nodule biopsy, bone marrow biopsy and EGD/C-Scope as part of LVAD workup. Pt found to have adenocarcinoma of the lung, and so LVAD was deferred pending treatment.  Patient completed radiation treatment for adenocarcinoma of the lung. Hem-onc cleared patient for VAD implant with 90% 2 year prognosis. He presented to [facility] on 4/3 for admission for planned LVAD implant which he underwent on 4/5/22. Was re-do sternotomy, bleeding intra-op, required cryo, k-centra, FFP, Plt, and cellsaver in OR. Had RV dysfunction noted intra-op; requiring lower VAD speed and dual inotrope support. 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 He also had issues with significant CT drainage requiring prolonged placement. He was eventually discharged home on POD 29 with GDMT (no BB due to RV failure) with [place] SN/PT and OT. He had persistent left pleural effusion and underwent thoracentesis in IR on 6/9/22 with 1.4L removed. INTERVAL HISTORY:  Today, patient presents for HF clinic visit. Overall he states he feels well, he is having some issues with hearing in B ears and is currently seeing the [place] for this. He denies acute HF symptoms, VAD alarms or issues with his driveline exit site. He is able to do his normal activities and is compliant with his medications. REVIEW OF SYSTEMS:  Review of Systems   Constitutional:  Negative for chills, fever, malaise/fatigue and weight loss. Respiratory:  Negative for cough and shortness of breath. Cardiovascular:  Negative for chest pain, palpitations, orthopnea and leg swelling. Gastrointestinal:  Negative for abdominal pain, heartburn, nausea and vomiting. Musculoskeletal:  Negative for falls and myalgias. Neurological:  Negative for dizziness, weakness and headaches. Psychiatric/Behavioral:  Negative for depression.            PHYSICAL EXAM:  Visit Vitals  BP (!) 88/0 (BP 1 Location: Right upper arm, BP Patient Position: Sitting, BP Cuff Size: Adult) Comment: MAP   Pulse (!) 52   Temp 97.7 °F (36.5 °C) (Oral)   Resp 20   Ht 6' 1\" (1.854 m)   Wt 201 lb (91.2 kg)   SpO2 96%   BMI 26.52 kg/m²      LVAD   Pump Speed (RPM): 4850  Pump Flow (LPM): 4.1  MAP: 88  PI (Pulsitility Index): 5.4  Power: 3.3  Driveline Dressing: Clean, Dry, and Intact  Outpatient: Yes  MAP in Therapeutic Range (Outpatient): Yes  Testing  Alarms Reviewed: Yes  Back up SC speed: 4800  Back up Low Speed Limit: 4400  Emergency Equipment with Patient?: Yes  Emergency procedures reviewed?: Yes  Drive line site inspected?: Yes  Drive line intergrity inspected?: Yes  Drive line dressing changed?: No      Physical Exam  Vitals reviewed. Constitutional:       General: He is not in acute distress. Appearance: Normal appearance. Cardiovascular:      Rate and Rhythm: Normal rate and regular rhythm. Heart sounds: Normal heart sounds. Comments: LVAD Hum noted on auscultation  Pulmonary:      Effort: Pulmonary effort is normal. No respiratory distress. Abdominal:      General: Bowel sounds are normal. There is no distension. Palpations: Abdomen is soft. Tenderness: There is no abdominal tenderness. Musculoskeletal:      Right lower leg: No edema. Left lower leg: No edema. Skin:     General: Skin is warm and dry. Capillary Refill: Capillary refill takes less than 2 seconds. Neurological:      General: No focal deficit present. Mental Status: He is alert and oriented to person, place, and time. Psychiatric:         Mood and Affect: Mood normal.         Behavior: Behavior normal.         Thought Content:  Thought content normal.         Judgment: Judgment normal.          PAST MEDICAL HISTORY:  Past Medical History:   Diagnosis Date    CAD (coronary artery disease) '90 '97, '13 x 2    MI, code ice in 2013 at [facility]    Calculus of kidney     Cardiac arrest ([facility] [address] 2/4/2013    Colonic polyps     Congestive heart failure, unspecified     Diabetes ([facility] [address]     Gastritis     Hypercholesterolemia     Sleep apnea        PAST SURGICAL HISTORY:  Past Surgical History:   Procedure Laterality Date    COLONOSCOPY  04/06/2011    16, due 21    COLONOSCOPY N/A 3/2/2022 COLONOSCOPY    :- [facility] Al[facility] ENDOSCOPY    ENDOSCOPY, COLON, DIAGNOSTIC      11, due 16    HX CORONARY ARTERY BYPASS GRAFT  8/22/12    x 4 vessel by MISSY CABRALES HEENT      LASIK    HX PACEMAKER PLACEMENT  1/30/13    MI CARDIAC SURG PROCEDURE UNLIST  2012    x 4 vessels       FAMILY HISTORY:  Family History   Problem Relation Age of Onset    Heart Disease Mother         MI    Heart Disease Father         CAD & PVD    Lung Disease Father     Cancer Father         lung    Diabetes Maternal Grandmother     Heart Disease Other         CAD    Stroke Sister        SOCIAL HISTORY:  Social History     Socioeconomic History    Marital status:    Tobacco Use    Smoking status: Never    Smokeless tobacco: Never   Vaping Use    Vaping Use: Never used   Substance and Sexual Activity    Alcohol use: Yes     Alcohol/week: 0.0 standard drinks     Comment:  VERY RARE    Drug use: No    Sexual activity: Not Currently       LABORATORY RESULTS:  Labs Latest Ref Rng & Units 11/17/2022   WBC 3.4 - 10.8 x10E3/uL 4.2   RBC 4.14 - 5.80 x10E6/uL 5.77   Hemoglobin 13.0 - 17.7 g/dL 13.5   Hematocrit 37.5 - 51.0 % 44.6   MCV 79 - 97 fL 77(L)   Platelets 269 - 584 P31Z8/   Albumin 3.7 - 4.7 g/dL 4.4   Calcium 8.6 - 10.2 mg/dL 9.2   Glucose 70 - 99 mg/dL 88   BUN 8 - 27 mg/dL 21   Creatinine 0.76 - 1.27 mg/dL 1.05   Sodium 134 - 144 mmol/L 141   Potassium 3.5 - 5.2 mmol/L 4.6    - 224 IU/L 179   Some recent data might be hidden       ALLERGY:  Allergies   Allergen Reactions    Oxycodone Anaphylaxis    Pcn [Penicillins] Hives        CURRENT MEDICATIONS:    Current Outpatient Medications:     bumetanide (BUMEX) 2 mg tablet, Take 1 mg by mouth daily. , Disp: , Rfl:     docusate sodium (COLACE) 100 mg capsule, Take 100 mg by mouth daily. , Disp: , Rfl:     eplerenone (INSPRA) 25 mg tablet, TAKE 1 TABLET BY MOUTH DAILY, Disp: 90 Tablet, Rfl: 1    famotidine (PEPCID) 20 mg tablet, TAKE 1 TABLET BY MOUTH EVERY 12 HOURS (Patient taking differently: as needed.), Disp: 60 Tablet, Rfl: 2    empagliflozin (JARDIANCE) 25 mg tablet, Take 0.5 Tablets by mouth daily. , Disp: 45 Tablet, Rfl: 3    sacubitriL-valsartan (Entresto) 24-26 mg tablet, Take 1 Tablet by mouth two (2) times a day., Disp: 60 Tablet, Rfl: 11    magnesium oxide (MAG-OX) 400 mg tablet, TAKE 1 TABLET BY MOUTH DAILY, Disp: 30 Tablet, Rfl: 3    hydrALAZINE (APRESOLINE) 10 mg tablet, Take 1 Tablet by mouth three (3) times daily. , Disp: 290 Tablet, Rfl: 3    glhdwvqsc-ZY-ilorlvxg-guaifen (Mucinex Fast-Max Cold-Flu) 10- mg/20 mL liqd, Take 20 mL by mouth every four (4) hours as needed for Cough. Take 20 mL by mouth every 4 hours as needed for cough, Disp: , Rfl:     tamsulosin (FLOMAX) 0.4 mg capsule, Take 1 Capsule by mouth daily. , Disp: 30 Capsule, Rfl: 2    warfarin (COUMADIN) 2.5 mg tablet, Take 2 Tablets by mouth daily. , Disp: 90 Tablet, Rfl: 2    metFORMIN (GLUCOPHAGE) 500 mg tablet, Take 1 Tablet by mouth two (2) times daily (with meals). , Disp: 180 Tablet, Rfl: 3    acetaminophen (TYLENOL) 500 mg tablet, Take 500 mg by mouth every six (6) hours as needed for Fever or Pain., Disp: , Rfl:     aspirin delayed-release 81 mg tablet, Take 81 mg by mouth daily. , Disp: , Rfl:     potassium chloride SR (K-TAB) 20 mEq tablet, Take 2 Tablets by mouth two (2) times a day. (Patient taking differently: Take 10 mEq by mouth two (2) times a day.  Four tablets twice daily), Disp: 360 Tablet, Rfl: 1    PATIENT CARE TEAM:  Patient Care Team:  Bishop Kayser, MD as PCP - General (Internal Medicine Physician)  Bishop Kayser, MD as PCP - [facility]  Nita Macias MD as Physician (Cardiovascular Disease Physician)  Genny Mars MD as Physician (Gastroenterology)  Ernestina White MD as Physician (Orthopedic Surgery)  Prema Watie MD as Physician (Ophthalmology)  Kristy Boas, MD (Cardiovascular Disease Physician)  Gwendolyn Wright MD (Cardiovascular Disease Physician)     Thank you for your referral and allowing me to participate in this patient's care.     Ade Marcelo NP  [address]  Office [phone]  Fax [phone]

## 2022-12-06 NOTE — LETTER
12/6/2022    Patient: Sara Wallis Sr   YOB: 1945   Date of Visit: 12/6/2022     Onel Perry, [address]    Dear Onel Perry MD,      Thank you for referring Mr. Abigail Palomo Sr to [address] [place] [address] for evaluation. My notes for this consultation are attached. If you have questions, please do not hesitate to call me. I look forward to following your patient along with you.       Sincerely,    Piotr Kahn NP

## 2022-12-08 ENCOUNTER — TELEPHONE (OUTPATIENT)
Dept: CARDIOLOGY CLINIC | Age: 77
End: 2022-12-08

## 2022-12-08 NOTE — TELEPHONE ENCOUNTER
Patient's wife left message stating patient has two large areas of skin irritation, one on each arm. Notes to be read itchy and raise. Advised patient's wife to reach out to PCP to evaluation. Magy Hunt NP notified who agreed and advised patient should reach out to PCP to evaluate this.

## 2022-12-09 ENCOUNTER — TELEPHONE (OUTPATIENT)
Dept: CARDIOLOGY CLINIC | Age: 77
End: 2022-12-09

## 2022-12-09 NOTE — TELEPHONE ENCOUNTER
Contacted patient via phone for INR reminder.     Telephone Call RE:  INR Reminder      Outcome:     [] Patient verbalizes understanding    [x] Unable to reach   [x] Left message              []     Left a message for patient in reference to INR reminder for Monday    [place], [place]

## 2022-12-10 LAB
ECHO AV CUSP MM: 0.8 CM
ECHO EST RA PRESSURE: 3 MMHG
ECHO LV FRACTIONAL SHORTENING: 18 % (ref 28–44)
ECHO LV INTERNAL DIMENSION DIASTOLE INDEX: 2.59 CM/M2
ECHO LV INTERNAL DIMENSION DIASTOLIC MMODE: 5.6 CM (ref 4.2–5.9)
ECHO LV INTERNAL DIMENSION DIASTOLIC: 5.6 CM (ref 4.2–5.9)
ECHO LV INTERNAL DIMENSION SYSTOLIC INDEX: 2.13 CM/M2
ECHO LV INTERNAL DIMENSION SYSTOLIC MMODE: 4.2 CM
ECHO LV INTERNAL DIMENSION SYSTOLIC: 4.6 CM
ECHO LV IVSD MMODE: 1.5 CM (ref 0.6–1)
ECHO LV IVSD: 1 CM (ref 0.6–1)
ECHO LV IVSS MMODE: 1.7 CM
ECHO LV MASS 2D: 205.5 G (ref 88–224)
ECHO LV MASS INDEX 2D: 95.1 G/M2 (ref 49–115)
ECHO LV POSTERIOR WALL DIASTOLIC MMODE: 1.2 CM (ref 0.6–1)
ECHO LV POSTERIOR WALL DIASTOLIC: 0.9 CM (ref 0.6–1)
ECHO LV POSTERIOR WALL SYSTOLIC MMODE: 1.5 CM
ECHO LV RELATIVE WALL THICKNESS RATIO: 0.32
ECHO LVOT AREA: 4.2 CM2
ECHO LVOT DIAM: 2.3 CM
ECHO RIGHT VENTRICULAR SYSTOLIC PRESSURE (RVSP): 25 MMHG
ECHO TV REGURGITANT MAX VELOCITY: 2.37 M/S
ECHO TV REGURGITANT PEAK GRADIENT: 22 MMHG

## 2022-12-12 ENCOUNTER — TELEPHONE ANTICOAG (OUTPATIENT)
Dept: CARDIOLOGY CLINIC | Age: 77
End: 2022-12-12

## 2022-12-12 LAB — INR, EXTERNAL: 2.5

## 2022-12-16 ENCOUNTER — TELEPHONE (OUTPATIENT)
Dept: CARDIOLOGY CLINIC | Age: 77
End: 2022-12-16

## 2022-12-16 DIAGNOSIS — R06.02 SOB (SHORTNESS OF BREATH): ICD-10-CM

## 2022-12-16 DIAGNOSIS — Z79.899 HIGH RISK MEDICATION USE: ICD-10-CM

## 2022-12-16 DIAGNOSIS — I50.22 CHRONIC SYSTOLIC HEART FAILURE (HCC): ICD-10-CM

## 2022-12-16 DIAGNOSIS — I50.9 CHRONIC CONGESTIVE HEART FAILURE, UNSPECIFIED HEART FAILURE TYPE (HCC): Primary | ICD-10-CM

## 2022-12-16 DIAGNOSIS — E78.2 MIXED HYPERLIPIDEMIA: ICD-10-CM

## 2022-12-16 DIAGNOSIS — Z95.811 LVAD (LEFT VENTRICULAR ASSIST DEVICE) PRESENT (HCC): ICD-10-CM

## 2022-12-16 DIAGNOSIS — Z79.01 CHRONIC ANTICOAGULATION: ICD-10-CM

## 2022-12-16 NOTE — TELEPHONE ENCOUNTER
Contacted patient via phone for lab reminder. Telephone Call RE:  Lab Reminder      Outcome:     [x] Patient verbalizes understanding    [] Unable to reach   [] Left message              [x]   Spoke with patient; reminded that he has FASTING labs on Monday.  Labs faxed to patient's desired lab    Jorge Solorzano , [address]

## 2022-12-20 ENCOUNTER — TELEPHONE (OUTPATIENT)
Dept: CARDIOLOGY CLINIC | Age: 77
End: 2022-12-20

## 2022-12-20 ENCOUNTER — TELEPHONE ANTICOAG (OUTPATIENT)
Dept: CARDIOLOGY CLINIC | Age: 77
End: 2022-12-20

## 2022-12-20 NOTE — TELEPHONE ENCOUNTER
----- Message from Beverly Marshall RN sent at 12/20/2022 10:21 AM EST -----  Please call crystal with [facility] regarding a lab alert reference # [id number], [phone], option 1

## 2022-12-20 NOTE — TELEPHONE ENCOUNTER
Returned call to lab [facility] and spoke with Jamie who reported WBC 2.4. Verified by readback. She had no further questions. Labs reviewed by Miguel River NP who recommended no changes at this time. Moises Miller RN.

## 2022-12-30 ENCOUNTER — TELEPHONE (OUTPATIENT)
Dept: CARDIOLOGY CLINIC | Age: 77
End: 2022-12-30

## 2022-12-30 NOTE — TELEPHONE ENCOUNTER
Telephone Call RE:  Lab Reminder      Outcome:     [x] Patient's wife verbalizes understanding    [] Unable to reach   [] Left message              []       Jason Bang RN

## 2023-01-02 ENCOUNTER — TELEPHONE (OUTPATIENT)
Dept: CARDIOLOGY CLINIC | Age: 78
End: 2023-01-02

## 2023-01-02 RX ORDER — SACUBITRIL AND VALSARTAN 24; 26 MG/1; MG/1
1 TABLET, FILM COATED ORAL 2 TIMES DAILY
Qty: 28 TABLET | Refills: 0 | Status: SHIPPED | OUTPATIENT
Start: 2023-01-02 | End: 2023-01-02

## 2023-01-02 NOTE — TELEPHONE ENCOUNTER
Received a page from patient's wife who stated patient's Grace Gibson had not arrived from mail order from [address] [facility]. Patient requested a 14 day supply of his Entresto be sent to [email or web address]. Dr. Moctezuma He made aware. A 14 day supply of Entresto 24-26 mg was called in to [employer] as electronic submission was not available.

## 2023-01-04 ENCOUNTER — TELEPHONE ANTICOAG (OUTPATIENT)
Dept: CARDIOLOGY CLINIC | Age: 78
End: 2023-01-04

## 2023-01-04 LAB — INR, EXTERNAL: 2.5

## 2023-01-16 ENCOUNTER — TELEPHONE (OUTPATIENT)
Dept: CARDIOLOGY CLINIC | Age: 78
End: 2023-01-16

## 2023-01-16 DIAGNOSIS — R06.02 SOB (SHORTNESS OF BREATH): ICD-10-CM

## 2023-01-16 DIAGNOSIS — I50.9 CHRONIC CONGESTIVE HEART FAILURE, UNSPECIFIED HEART FAILURE TYPE (HCC): Primary | ICD-10-CM

## 2023-01-16 DIAGNOSIS — Z95.811 LVAD (LEFT VENTRICULAR ASSIST DEVICE) PRESENT (HCC): ICD-10-CM

## 2023-01-16 DIAGNOSIS — Z79.01 CHRONIC ANTICOAGULATION: ICD-10-CM

## 2023-01-16 NOTE — TELEPHONE ENCOUNTER
Contacted patient via phone for lab reminder.     Telephone Call RE:  Lab Reminder      Outcome:     [x] Patient verbalizes understanding    [] Unable to reach   [] Left message              [x]   Labs faxed to patients desired lab [facility])    Dave Velasco , [address]

## 2023-01-18 ENCOUNTER — TELEPHONE ANTICOAG (OUTPATIENT)
Dept: CARDIOLOGY CLINIC | Age: 78
End: 2023-01-18

## 2023-01-18 LAB
ALBUMIN SERPL-MCNC: 4.2 G/DL (ref 3.7–4.7)
ALBUMIN/GLOB SERPL: 1.4 {RATIO} (ref 1.2–2.2)
ALP SERPL-CCNC: 143 IU/L (ref 44–121)
ALT SERPL-CCNC: 10 IU/L (ref 0–44)
AST SERPL-CCNC: 38 IU/L (ref 0–40)
BILIRUB SERPL-MCNC: 0.4 MG/DL (ref 0–1.2)
BUN SERPL-MCNC: 20 MG/DL (ref 8–27)
BUN/CREAT SERPL: 17 (ref 10–24)
CALCIUM SERPL-MCNC: 9.2 MG/DL (ref 8.6–10.2)
CHLORIDE SERPL-SCNC: 102 MMOL/L (ref 96–106)
CO2 SERPL-SCNC: 21 MMOL/L (ref 20–29)
CREAT SERPL-MCNC: 1.21 MG/DL (ref 0.76–1.27)
EGFR: 62 ML/MIN/1.73
ERYTHROCYTE [DISTWIDTH] IN BLOOD BY AUTOMATED COUNT: 17.6 % (ref 11.6–15.4)
GLOBULIN SER CALC-MCNC: 3 G/DL (ref 1.5–4.5)
GLUCOSE SERPL-MCNC: 112 MG/DL (ref 70–99)
HCT VFR BLD AUTO: 46.9 % (ref 37.5–51)
HGB BLD-MCNC: 14.7 G/DL (ref 13–17.7)
INR PPP: 1.9 (ref 0.9–1.2)
LDH SERPL L TO P-CCNC: 186 IU/L (ref 121–224)
MAGNESIUM SERPL-MCNC: 2.3 MG/DL (ref 1.6–2.3)
MCH RBC QN AUTO: 25.1 PG (ref 26.6–33)
MCHC RBC AUTO-ENTMCNC: 31.3 G/DL (ref 31.5–35.7)
MCV RBC AUTO: 80 FL (ref 79–97)
NT-PROBNP SERPL-MCNC: 810 PG/ML (ref 0–486)
PLATELET # BLD AUTO: 213 X10E3/UL (ref 150–450)
POTASSIUM SERPL-SCNC: 4.9 MMOL/L (ref 3.5–5.2)
PROT SERPL-MCNC: 7.2 G/DL (ref 6–8.5)
PROTHROMBIN TIME: 19.8 SEC (ref 9.1–12)
RBC # BLD AUTO: 5.86 X10E6/UL (ref 4.14–5.8)
SODIUM SERPL-SCNC: 139 MMOL/L (ref 134–144)
WBC # BLD AUTO: 3.8 X10E3/UL (ref 3.4–10.8)

## 2023-01-18 NOTE — PATIENT INSTRUCTIONS
Medication changes:    NONE today       Please take this to your pharmacy to notify them of the change in medications. Testing Ordered:    None today     Other Recommendations:     Please contact [facility] for any alarms. 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 Your urine should be clear and light yellow straw colored. Follow up in 1 month with Rudolph Mccloud      For further patient and caregiver resources as well as access to online LVAD support groups visit [email or web address]       Please bring your daily sheets and medications to your next appointment. Please monitor your weights daily upon waking and after using the bathroom. Keep a written records of your weights and bring to your next appointment. If you have a weight gain of 3 or more pounds overnight OR 5 or more pounds in one week please contact our office. Thank you for allowing us the privilege of being a part of your healthcare team! Please do not hesitate to contact our office at [phone] with any questions or concerns. Virtual Heart Failure [address] invites you to learn more about heart failure and to share your questions, ideas, and experiences with others. Each month, the Heart Failure Support Group features a new educational topic and a guest speaker, followed by an interactive discussion. Our Heart Failure Nurse Navigator will moderate each session. You will be able to participate by phone, tablet or computer through [address] [place] [address]. This support group takes place on the 3rd Thursday of each month from 6:00-7:30PM. All individuals living with heart failure and their caregivers are welcome to join.      If you are interested in participating, please contact us at [email or web address] and you will be sent the link to join the [facility].

## 2023-01-19 ENCOUNTER — OFFICE VISIT (OUTPATIENT)
Dept: CARDIOLOGY CLINIC | Age: 78
End: 2023-01-19
Payer: MEDICARE

## 2023-01-19 VITALS
RESPIRATION RATE: 18 BRPM | HEART RATE: 56 BPM | TEMPERATURE: 97.9 F | OXYGEN SATURATION: 97 % | HEIGHT: 73 IN | SYSTOLIC BLOOD PRESSURE: 86 MMHG | WEIGHT: 206 LBS | BODY MASS INDEX: 27.3 KG/M2

## 2023-01-19 DIAGNOSIS — Z95.811 LVAD (LEFT VENTRICULAR ASSIST DEVICE) PRESENT (HCC): Primary | ICD-10-CM

## 2023-01-19 DIAGNOSIS — R06.02 SOB (SHORTNESS OF BREATH): ICD-10-CM

## 2023-01-19 DIAGNOSIS — I50.9 CHRONIC CONGESTIVE HEART FAILURE, UNSPECIFIED HEART FAILURE TYPE (HCC): ICD-10-CM

## 2023-01-19 DIAGNOSIS — Z79.01 CHRONIC ANTICOAGULATION: ICD-10-CM

## 2023-01-19 PROCEDURE — 93750 INTERROGATION VAD IN PERSON: CPT | Performed by: NURSE PRACTITIONER

## 2023-01-19 PROCEDURE — 1123F ACP DISCUSS/DSCN MKR DOCD: CPT | Performed by: NURSE PRACTITIONER

## 2023-01-19 PROCEDURE — 99214 OFFICE O/P EST MOD 30 MIN: CPT | Performed by: NURSE PRACTITIONER

## 2023-01-19 NOTE — LETTER
1/19/2023    Patient: Chris Travis   YOB: 1945   Date of Visit: 1/19/2023     Roxanne Casiano, [address]    Dear Roxanne Casiano MD,      Thank you for referring Mr. Renetta Mcginnis Sr to [address] [facility] for evaluation. My notes for this consultation are attached. If you have questions, please do not hesitate to call me. I look forward to following your patient along with you.       Sincerely,    Akil Lindsey NP

## 2023-01-19 NOTE — PROGRESS NOTES
Mr. Chichi Thomas was seen today in clinic for a visit with Arianna Nguyen, NP     Patient denied s/s of driveline infection or driveline trauma (including  drops). Lines inspected, no kinks or tears noted. Last 6 alarms on controller show two low flows lasting 1 second and 6 seconds. Above reported to provider. LVAD interrogation completed in clinic, results reported to provider. See flow sheet for details. All orders entered per VORB. Educated patient and wife to call for all LVAD alarms. Both verbalized understanding.

## 2023-01-19 NOTE — PROGRESS NOTES
[facility], [address] [place] [address] Avenue [address] Note    Patient name: Jenni Sever  Patient : 1945  Patient MRN: [id number]  Date of service: 23    Primary care physician: Melida Martinez MD    LVAD cardiologist: Dunia Carrera MD    CHIEF COMPLAINT:  Chief Complaint   Patient presents with    Follow-up     LVAD    Leg Swelling     Bilateral; Very little per patient         PLAN OF CARE:  69 y.o. male with severe ischemic cardiomyopathy, LVEF 15-20% s/p HM3 LVAD implant as DT on 22 by Dr. Jasmin Fernandez c/b intraoperative bleeding requiring multiple blood products and subsequent RV failure with severe TR requiring prolonged dobutamine wean and long term CT support, followed by left thoracentesis 22 by IR; 1.4L removed and no further complications; doing well   Annual visit due in April     INTERVAL HISTORY:  -MAP 86; HR 56;  two short low flow alarms on interrogation.    -recent labs  without significant changes or concerns  -weight up 5lbs since last month;  patient states he didn't take his bumex yet today. Weights have been steady at home   -ECHO  with EF 20-25%; RV dilated with reduced systolic function; mod TR  -Mr. Dianne Stein is here for routine LVAD follow up. He is feeling great. He denies SOB/BRAND, chest pain, palpitations, orthopnea, PND, dizziness. He has some occasional leg swelling. No issues with driveline, site, equipment. He's had a few short alarms when he's been bending over. Able to walk in to clinic without stopping. Has been taking medications as prescribed, but he avoids his morning water pill when he's coming to clinic. This is a new change for him to limit bathroom breaks when he's here. He'll take his bumex this afternoon when he gets home. Wife present at visit.        RECOMMENDATIONS:  Continue speed 4800rpms, low speed 4400  No BB due to RV failure   Continue Entresto 24/26mg BID; not orthostatic  Cont Eplerenone 25mg daily  Continue bumex 1mg daily. Slight swelling and weight gain today, but hasn't taken his bumex yet. Will have him call next week with weights to make sure he isn't gaining. Continue Jardiance 12.5mg daily. Unsure why taking 1/2 of a 25mg tablet. May be limited to what [facility] has on formulary. Continue hydralazine 10mg TID  Continue warfarin, goal INR 2-3 see tracker for details   ASA 81mg daily  Continue Flomax 0.4mg daily  Cont pepcid  Routine LVAD labs reviewed;  due again in 1 month   Echo completed last month  6mw due in March  Driveline dressing changes 3 times a week  CPAP qHS, encouraged to use nightly   [facility]ills [facility]  Advanced care plan forms on file   Repeat Chest CT annually for previous lung nodules, next in August 2023  Up to date on Flu/PNA/COVID vaccines, scheduled for updated Shingles vaccine  Has follow up with heme/onc soon: Dr. Anitra Paul and Dispositions    Return in about 1 month (around 2/19/2023). Follow up in clinic in 1 month. Labs ordered. 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    PET (6/2019) EF 24% with anterior lateral and inferior reversible defect  Cardiac risk factors  HTN  HL  DM2  SRUTHI on CPAP  Mild carotid stenosis  Anemia, microcytic  Iron deficiency  DVT with filter  Pulmonary nodules/history of lung cancer s/p radiation 9/21   Dysphagia      LIFE GOALS:  Patient life goals discussed. CARDIAC IMAGING:    Echo (12/6/22) EF 20-25%; RV dilated with reduced systolic function; mod TR    Echo (6/28/22): LVEF 25 - 30%. Mildly increased wall thickness. Severe global hypokinesis present     Echo (6/2/22) LVEF 20-25%, mild MR, mod to severe TR, LVIDd 4.9cm, RVIDd 6cm, TAPSE 0.9cm      Echo (5/4/22) LVEF 15-20%, mod TR, LVIDd 5.3 cm, TAPSE 0.9 cm      Echo (4/26/22) - LVIDd 4.9 cm, TAPSE 0.5 cm,      Echo (4/18/22)- LVEF 15-20%, severe TR, TAPSE 0.5cm, RVIDd 6cm      Echo (4/8/22): LVEF 10-15%. Severe global hypokinesis present. 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 Echo (3/2/22): LVEF 10-15%; Mod-severe MR      Echo (11/23/21): LVEF 15-20%; Mod-severe, MR, mod-TR     Echo (5/20/21): LVEF is 20 - 25%. Severely and globally reduced systolic function. Severe (grade 4) left ventricular diastolic dysfunction. [facility] (5/24/21) Native Coronaries: LM - moderate to severe distal disease 50%. % prox occluded (), heavily calcified, LCx: 80% proximal stenosis. OM1 is  (seen filling faintly via collaterals). OM2 99% stenosis. RCA: 100% proximal occluded. LIMA to LAD: patent, supplies only a diagonal branch (probably D2). The LAD distal to the bifurcation is 100% occluded () and fills via right to left collaterals off the SVG to RCA. SVG to R-PDA: patent with moderate diffuse irregularities but no obstructive disease in the graft. No appealing interventional targets. 6 Min Walk Report 9/27/2022 5/4/2022 5/2/2022 11/12/2021 7/6/2021 5/20/2021   (PRE) HR 70 82 82 88 98 74   (PRE) O2 Sat 96 - 98 100 - 99   (POST) HR 79 91 88 102 - 81   (POST) O2 Sat 100 - 98 99 98% on Ra 99   Distance in Meters 336.8 191.41 181.36 386. 62 - 1158.24            BRIEF HISTORY OF PRESENT ILLNESS:  From prior notes,   \"Heron Vargas Sr is a 69 y.o. male with h/o HTN, HL, DM2, SRUTHI on CPAP, chronic systolic heart failure, stage D, NYHA class IV symptoms, non-ischemic cardiomyopathy, LVEF 20% with LVEDD 6.2, RV dysfunciton, TAPSE 1.22, TBili 1.5 likely from cardiac congestion, recent cardiac arrest s/p ICD (1/2013, [employer] followed by [facility]), coronary artery disease s/p multiple interventions s/p 4V CABG (8/2012), LHC (7/2019) severe stenosis of LIMA to LAD anastomosis site, SVG graft to OM is occluded, SVG graft to RCA 40-50%, LAD occluded proximally, severe proximal LCx, RCA is the long . PET (6/2019) EF 24% with anterior lateral and inferior reversible defect. Anemia, microcytic with iron deficiency, DVT with filter. Patient was referred to [facility] by Dr. Padgett Even in 2021 for evaluation of his candidacy for advanced therapies. Patient agreed to inpatient initiation of palliative infusion of chronic inotropes and evaluation for LVAD-DT. Patient was admitted 5/2-5/25/21. Patient was started on milrinone infusion and had first part of LVAD evaluation completed. Right and left heart cath on 5/24/21 showed severe diffuse native vessel CAD, with patent grafts (LIMA to D2, SVG to RCA). Pt was found to have pulmonary nodule during workup. Antiplatelet therapy was held during hospitalization and after discharge due to anticipated pulmonary nodule biopsy, bone marrow biopsy and EGD/C-Scope as part of LVAD workup. Pt found to have adenocarcinoma of the lung, and so LVAD was deferred pending treatment. Patient completed radiation treatment for adenocarcinoma of the lung. Hem-onc cleared patient for VAD implant with 90% 2 year prognosis. He presented to [facility] on 4[facility]3 for admission for planned LVAD implant which he underwent on 4/5/22. Was re-do sternotomy, bleeding intra-op, required cryo, k-centra, FFP, Plt, and cellsaver in OR. Had RV dysfunction noted intra-op; requiring lower VAD speed and dual inotrope support. 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 He also had issues with significant CT drainage requiring prolonged placement. He was eventually discharged home on POD 29 with GDMT (no BB due to RV failure) with [facility] SN/PT and OT. He had persistent left pleural effusion and underwent thoracentesis in IR on 6/9/22 with 1.4L removed.  \"            REVIEW OF SYSTEMS:  Review of Symptoms:  Constitutional: negative  Eyes: negative  Ears, nose, mouth, throat, and face: negative  Respiratory: negative  Cardiovascular: slight leg swelling occasionally   Gastrointestinal: negative  Genitourinary:negative  Musculoskeletal:negative  Neurological: negative  Behvioral/Psych: negative  Endocrine: negative           PHYSICAL EXAM:  Visit Vitals  BP (!) 86/0 (BP 1 Location: Right upper arm, BP Patient Position: Sitting, BP Cuff Size: Adult) Comment: MAP   Pulse (!) 56   Temp 97.9 °F (36.6 °C) (Oral)   Resp 18   Ht 6' 1\" (1.854 m)   Wt 206 lb (93.4 kg)   SpO2 97%   BMI 27.18 kg/m²        LVAD   Pump Speed (RPM): 4800  Pump Flow (LPM): 3.8  MAP: 86  PI (Pulsitility Index): 7.3  Power: 3.2  Outpatient: Yes  MAP in Therapeutic Range (Outpatient): Yes  Testing  Alarms Reviewed: Yes  Back up SC speed: 4800  Back up Low Speed Limit: 4400  Emergency Equipment with Patient?: Yes  Emergency procedures reviewed?: Yes  Drive line site inspected?: Yes  Drive line intergrity inspected?: Yes  Drive line dressing changed?: No      Physical Assessment:   General Appearance: alert, cooperative, pleasant, elderly AAM sitting in chair in NAD appears stated age  Eyes: sclera anicteric  Mouth/Throat: moist mucous membranes; oral pharynx clear  Neck: supple; slight JVD  Pulmonary:  clear to auscultation bilaterally; good effort;   Cardiovascular: regular rate and rhythm; VAD hum;  2/6 murmur   Abdomen: soft, non-tender, non-distended; bowel sounds normal  Musculoskeletal: no swelling or deformity; moves all extremities  Extremities: trace edema L>R; non-palpable distal pulses   Skin: warm and dry  Neuro: grossly normal  Psych: normal mood and affect given the setting         PAST MEDICAL HISTORY:  Past Medical History:   Diagnosis Date    CAD (coronary artery disease) '90 '97, '13 x 2    MI, code ice in 2013 at [place]    Calculus of kidney     Cardiac arrest ([facility] [address] 2/4/2013    Colonic polyps     Congestive heart failure, unspecified     Diabetes ([facility] [address]     Gastritis     Hypercholesterolemia     Sleep apnea        PAST SURGICAL HISTORY:  Past Surgical History:   Procedure Laterality Date    COLONOSCOPY  04/06/2011    16, due 21    COLONOSCOPY N/A 3/2/2022    COLONOSCOPY    :- performed by Harish Silveira MD at [facility] ENDOSCOPY    ENDOSCOPY, COLON, DIAGNOSTIC      11, due 16    HX CORONARY ARTERY BYPASS GRAFT  8/22/12    x 4 vessel by S. James    HX HEENT      LASIK    HX PACEMAKER PLACEMENT  1/30/13    ME UNLISTED PROCEDURE CARDIAC SURGERY  2012    x 4 vessels       FAMILY HISTORY:  Family History   Problem Relation Age of Onset    Heart Disease Mother         MI    Heart Disease Father         CAD & PVD    Lung Disease Father     Cancer Father         lung    Diabetes Maternal Grandmother     Heart Disease Other         CAD    Stroke Sister        SOCIAL HISTORY:  Social History     Socioeconomic History    Marital status:    Tobacco Use    Smoking status: Never    Smokeless tobacco: Never   Vaping Use    Vaping Use: Never used   Substance and Sexual Activity    Alcohol use:  Yes     Alcohol/week: 0.0 standard drinks     Comment:  VERY RARE    Drug use: No    Sexual activity: Not Currently       LABORATORY RESULTS:  Labs Latest Ref Rng & Units 1/17/2023 12/19/2022   WBC 3.4 - 10.8 x10E3/uL 3.8 2.4(LL)   RBC 4.14 - 5.80 x10E6/uL 5.86(H) 5.74   Hemoglobin 13.0 - 17.7 g/dL 14.7 14.1   Hematocrit 37.5 - 51.0 % 46.9 44.1   MCV 79 - 97 fL 80 77(L)   Platelets 232 - 729 H72G7/ 180   Albumin 3.7 - 4.7 g/dL 4.2 4.2   Calcium 8.6 - 10.2 mg/dL 9.2 9.3   Glucose 70 - 99 mg/dL 112(H) 112(H)   BUN 8 - 27 mg/dL 20 20   Creatinine 0.76 - 1.27 mg/dL 1.21 1.22   Sodium 134 - 144 mmol/L 139 140   Potassium 3.5 - 5.2 mmol/L 4.9 4.6    - 224 IU/L 186 163   Some recent data might be hidden       ALLERGY:  Allergies   Allergen Reactions    Oxycodone Anaphylaxis    Pcn [Penicillins] Hives        CURRENT MEDICATIONS:    Current Outpatient Medications:     bumetanide (BUMEX) 2 mg tablet, Take 1 mg by mouth daily. , Disp: , Rfl:     docusate sodium (COLACE) 100 mg capsule, Take 100 mg by mouth daily. , Disp: , Rfl:     eplerenone (INSPRA) 25 mg tablet, TAKE 1 TABLET BY MOUTH DAILY, Disp: 90 Tablet, Rfl: 1    famotidine (PEPCID) 20 mg tablet, TAKE 1 TABLET BY MOUTH EVERY 12 HOURS (Patient taking differently: as needed.), Disp: 60 Tablet, Rfl: 2    empagliflozin (JARDIANCE) 25 mg tablet, Take 0.5 Tablets by mouth daily. , Disp: 45 Tablet, Rfl: 3    sacubitriL-valsartan (Entresto) 24-26 mg tablet, Take 1 Tablet by mouth two (2) times a day., Disp: 60 Tablet, Rfl: 11    magnesium oxide (MAG-OX) 400 mg tablet, TAKE 1 TABLET BY MOUTH DAILY, Disp: 30 Tablet, Rfl: 3    hydrALAZINE (APRESOLINE) 10 mg tablet, Take 1 Tablet by mouth three (3) times daily. , Disp: 290 Tablet, Rfl: 3    johvnfgit-DJ-fudougdx-guaifen (Mucinex Fast-Max Cold-Flu) 10- mg/20 mL liqd, Take 20 mL by mouth every four (4) hours as needed for Cough. Take 20 mL by mouth every 4 hours as needed for cough, Disp: , Rfl:     tamsulosin (FLOMAX) 0.4 mg capsule, Take 1 Capsule by mouth daily. , Disp: 30 Capsule, Rfl: 2    warfarin (COUMADIN) 2.5 mg tablet, Take 2 Tablets by mouth daily. , Disp: 90 Tablet, Rfl: 2    potassium chloride SR (K-TAB) 20 mEq tablet, Take 2 Tablets by mouth two (2) times a day. (Patient taking differently: Take 10 mEq by mouth two (2) times a day. Four tablets twice daily), Disp: 360 Tablet, Rfl: 1    metFORMIN (GLUCOPHAGE) 500 mg tablet, Take 1 Tablet by mouth two (2) times daily (with meals). , Disp: 180 Tablet, Rfl: 3    acetaminophen (TYLENOL) 500 mg tablet, Take 500 mg by mouth every six (6) hours as needed for Fever or Pain., Disp: , Rfl:     aspirin delayed-release 81 mg tablet, Take 81 mg by mouth daily. , Disp: , Rfl:     PATIENT CARE TEAM:  Patient Care Team:  Nette Hall MD as PCP - General (Internal Medicine Physician)  Nette Hall MD as PCP - [facility] Provider  Gerri Iverson MD as Physician (Cardiovascular Disease Physician)  Jamaal Mecrado MD as Physician (Gastroenterology)  Karolyn Ralph MD as Physician (Orthopedic Surgery)  Val Lewis MD as Physician (Ophthalmology)  Perlita Galvez MD (Cardiovascular Disease Physician)  Cody Huitron MD (Cardiovascular Disease Physician)     Thank you for your referral and allowing me to participate in this patient's care. Ivon Gracia NP  [address] [place] [address] [address]  Office [phone]  Fax [phone]    On this date 1/19/2023, I have spent a total time of 30 minutes personally reviewing new vitals, test results, notes from recent visits, face to face encounter/physical exam of patient, reviewing VAD interrogation results, writing orders, performing medical decision making, counseling patient, and documenting.

## 2023-01-24 ENCOUNTER — TELEPHONE (OUTPATIENT)
Dept: CARDIOLOGY CLINIC | Age: 78
End: 2023-01-24

## 2023-01-24 NOTE — TELEPHONE ENCOUNTER
Contacted patient via phone for INR reminder. Telephone Call RE:  INR Reminder      Outcome:     [x] Patient verbalizes understanding    [] Unable to reach   [] Left message              [x]     Spoke with patient in reference to INR reminder for tomorrow. Patient agrees to conduct INR and self report.     Holger Welch , [address] [place] [address]

## 2023-01-25 ENCOUNTER — TELEPHONE (OUTPATIENT)
Dept: CARDIOLOGY CLINIC | Age: 78
End: 2023-01-25

## 2023-01-25 NOTE — TELEPHONE ENCOUNTER
Spoke to contact at [facility] who stated that patient is able to get 10mg tablets of Jardiance from their pharmacy. Notified provider John Bernal who stated that patient can switch to 10mg dosing as it is indicated for HR  (currently getting 12.5mg). Notified patient and wife who verbalized understanding.  Also stated weight is consistent, no weight gain, increased edema bloating or SOB

## 2023-01-26 ENCOUNTER — TELEPHONE ANTICOAG (OUTPATIENT)
Dept: CARDIOLOGY CLINIC | Age: 78
End: 2023-01-26

## 2023-01-26 LAB — INR, EXTERNAL: 2.7

## 2023-01-27 ENCOUNTER — TELEPHONE (OUTPATIENT)
Dept: CARDIOLOGY CLINIC | Age: 78
End: 2023-01-27

## 2023-01-31 ENCOUNTER — TRANSCRIBE ORDER (OUTPATIENT)
Dept: SCHEDULING | Age: 78
End: 2023-01-31

## 2023-01-31 DIAGNOSIS — D50.9 IRON DEFICIENCY ANEMIA, UNSPECIFIED: Primary | ICD-10-CM

## 2023-01-31 DIAGNOSIS — C34.12 MALIGNANT NEOPLASM OF UPPER LOBE, LEFT BRONCHUS OR LUNG (HCC): ICD-10-CM

## 2023-02-01 ENCOUNTER — TELEPHONE (OUTPATIENT)
Dept: CARDIOLOGY CLINIC | Age: 78
End: 2023-02-01

## 2023-02-01 NOTE — TELEPHONE ENCOUNTER
Patient called stating he has been getting a \"connect to power\" alarm while on batteries. States he has cleaned clips and batteries with alcohol swabs already. Advised to switch to back up clips and call office back if alarm continues.

## 2023-02-01 NOTE — TELEPHONE ENCOUNTER
Contacted patient via phone for INR reminder. Telephone Call RE:  INR Reminder      Outcome:     [x] Patient verbalizes understanding    [] Unable to reach   [] Left message              [x]     Spoke with patient in reference to INR reminder for tomorrow. Patient agrees to conduct INR and self report.     June Parnell , [address]

## 2023-02-02 ENCOUNTER — TELEPHONE ANTICOAG (OUTPATIENT)
Dept: CARDIOLOGY CLINIC | Age: 78
End: 2023-02-02

## 2023-02-02 LAB — INR, EXTERNAL: 2.4

## 2023-02-02 NOTE — TELEPHONE ENCOUNTER
Spoke to patient's wife who reports patient has not had any more alarms since switching clips. Will order patient a replacement set.

## 2023-02-02 NOTE — TELEPHONE ENCOUNTER
Julia Kim is a 69 y.o. male with a history of severe ischemic cardiomyopathy with Heartmate 3 implant date of 04/05/22. Patient called and noted he was getting \"connect to power\" alarms, which resolved with switching to back up clips. Extra clips ordered to ensure safe operation of device per  guidelines.

## 2023-02-14 ENCOUNTER — OFFICE VISIT (OUTPATIENT)
Dept: CARDIOLOGY CLINIC | Age: 78
End: 2023-02-14
Payer: MEDICARE

## 2023-02-14 VITALS
OXYGEN SATURATION: 98 % | HEART RATE: 36 BPM | BODY MASS INDEX: 27.63 KG/M2 | SYSTOLIC BLOOD PRESSURE: 100 MMHG | WEIGHT: 209.4 LBS | RESPIRATION RATE: 18 BRPM

## 2023-02-14 DIAGNOSIS — R00.1 BRADYCARDIA: ICD-10-CM

## 2023-02-14 DIAGNOSIS — Z79.899 ENCOUNTER FOR LONG-TERM (CURRENT) USE OF OTHER MEDICATIONS: ICD-10-CM

## 2023-02-14 DIAGNOSIS — Z95.811 LVAD (LEFT VENTRICULAR ASSIST DEVICE) PRESENT (HCC): Primary | ICD-10-CM

## 2023-02-14 DIAGNOSIS — R06.02 SHORTNESS OF BREATH: ICD-10-CM

## 2023-02-14 DIAGNOSIS — Z79.899 ENCOUNTER FOR MONITORING DIURETIC THERAPY: ICD-10-CM

## 2023-02-14 DIAGNOSIS — Z51.81 ENCOUNTER FOR MONITORING DIURETIC THERAPY: ICD-10-CM

## 2023-02-14 DIAGNOSIS — I50.22 CHRONIC SYSTOLIC HEART FAILURE (HCC): ICD-10-CM

## 2023-02-14 PROCEDURE — 99000 SPECIMEN HANDLING OFFICE-LAB: CPT | Performed by: NURSE PRACTITIONER

## 2023-02-14 PROCEDURE — 1123F ACP DISCUSS/DSCN MKR DOCD: CPT | Performed by: NURSE PRACTITIONER

## 2023-02-14 PROCEDURE — 99214 OFFICE O/P EST MOD 30 MIN: CPT | Performed by: NURSE PRACTITIONER

## 2023-02-14 PROCEDURE — 93750 INTERROGATION VAD IN PERSON: CPT | Performed by: NURSE PRACTITIONER

## 2023-02-14 PROCEDURE — 93000 ELECTROCARDIOGRAM COMPLETE: CPT | Performed by: NURSE PRACTITIONER

## 2023-02-14 NOTE — PROGRESS NOTES
[facility], [address] [place] [address] Avenue [address] Note    Patient name: Tammi Bansal  Patient : 1945  Patient MRN: [id number]  Date of service: 23    Primary care physician: Jo Crow MD    LVAD cardiologist: Jeff Brock MD    CHIEF COMPLAINT:  Chief Complaint   Patient presents with    Follow-up     LVAD         PLAN OF CARE:  69 y.o. male with severe ischemic cardiomyopathy, LVEF 15-20% s/p HM3 LVAD implant as DT on 22 by Dr. Dhiraj Wilkinson c/b intraoperative bleeding requiring multiple blood products and subsequent RV failure with severe TR requiring prolonged dobutamine wean and long term CT support, followed by left thoracentesis 22 by IR; 1.4L removed and no further complications; doing well   Annual visit due in April     INTERVAL HISTORY:  - with palpable pulse;  HR 36 by pulse ox - feels accurate by palpation   -had two low flow alarms on Friday for a few seconds. Patient didn't notice. -no labs since last visit. Obtain today   -weight up 3lbs since last visit. Did not take bumex yet today.   -Mr. Alvin Roche is here for routine LVAD follow up. He is feeling good. He denies SOB at rest.  Able to perform activities of daily living. He worked with PT today. No chest pain, palpitations, orthopnea, PND. Hasn't been having any more alarms bending over since his clips were changed. He believes his leg swelling is about his usual.  No drive line concerns. Needs a new bracelet. Has an overnight trip coming up. RECOMMENDATIONS:  Continue speed 4800rpms, low speed 4400  No BB due to RV failure   Continue Entresto 24/26mg BID; Cont Eplerenone 25mg daily  Increase bumex to 2mg daily for 4 days and then decrease back to bumex 1mg daily. Update on Monday with weight and symptoms and RN visit next week.    Continue Jardiance 10mg daily  Continue hydralazine 10mg TID  Continue warfarin, goal INR 2-3 see tracker for details   ASA 81mg daily  Continue Flomax 0.4mg daily  Cont pepcid  Routine LVAD labs today   Obtain EKG today. EKG SB with 1st degree AVB (rate 59). Similar to prior other than slower rate. Echo completed 12/22  6mw due in March  Driveline dressing changes 3 times a week  CPAP qHS, encouraged to use nightly   [facility]  Advanced care plan forms on file   Repeat Chest CT annually for previous lung nodules, next in 2/22/2023  Up to date on Flu/PNA/COVID vaccines, scheduled for updated Shingles vaccine  Follow up with EP:  Dr. Marco Meehan   Has follow up with heme/onc: Dr. Una Bazan      RN visit in 1 week. Follow up in clinic with provider in 1 month. Labs today. 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 Patient states ICD was deactivated due to needing to upgrade his wires and he didn't want another procedure at the time. Coronary artery disease  S/p multiple interventions  S/p 4V CABG (8/2012)  [facility] (7/2019) severe stenosis of LIMA to LAD anastomosis site, SVG graft to OM is occluded, SVG graft to RCA 40-50%, LAD occluded proximally, severe proximal LCx, RCA is the long . PET (6/2019) EF 24% with anterior lateral and inferior reversible defect  Cardiac risk factors  HTN  HL  DM2  SRUTHI on CPAP  Mild carotid stenosis  Anemia, microcytic  Iron deficiency  DVT with filter  Pulmonary nodules/history of lung cancer s/p radiation 9/21   Dysphagia      LIFE GOALS:  Patient life goals discussed. CARDIAC IMAGING:    Echo (12/6/22) EF 20-25%; RV dilated with reduced systolic function; mod TR    Echo (6/28/22): LVEF 25 - 30%.  Mildly increased wall thickness. Severe global hypokinesis present     Echo (6/2/22) LVEF 20-25%, mild MR, mod to severe TR, LVIDd 4.9cm, RVIDd 6cm, TAPSE 0.9cm      Echo (5/4/22) LVEF 15-20%, mod TR, LVIDd 5.3 cm, TAPSE 0.9 cm      Echo (4/26/22) - LVIDd 4.9 cm, TAPSE 0.5 cm,      Echo (4/18/22)- LVEF 15-20%, severe TR, TAPSE 0.5cm, RVIDd 6cm      Echo (4/8/22): LVEF 10-15%. Severe global hypokinesis present. 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 Echo (3/2/22): LVEF 10-15%; Mod-severe MR      Echo (11/23/21): LVEF 15-20%; Mod-severe, MR, mod-TR     Echo (5/20/21): LVEF is 20 - 25%. Severely and globally reduced systolic function. Severe (grade 4) left ventricular diastolic dysfunction. C (5/24/21) Native Coronaries: LM - moderate to severe distal disease 50%. % prox occluded (), heavily calcified, LCx: 80% proximal stenosis. OM1 is  (seen filling faintly via collaterals). OM2 99% stenosis. RCA: 100% proximal occluded. LIMA to LAD: patent, supplies only a diagonal branch (probably D2). The LAD distal to the bifurcation is 100% occluded () and fills via right to left collaterals off the SVG to RCA. SVG to R-PDA: patent with moderate diffuse irregularities but no obstructive disease in the graft. No appealing interventional targets. EKG (2/14/23):  SB with 1st degree AVB       6 Min Walk Report 9/27/2022 5/4/2022 5/2/2022 11/12/2021 7/6/2021 5/20/2021   (PRE) HR 70 82 82 88 98 74   (PRE) O2 Sat 96 - 98 100 - 99   (POST) HR 79 91 88 102 - 81   (POST) O2 Sat 100 - 98 99 98% on Ra 99   Distance in Meters 336.8 191.41 181.36 386. 62 - 1158.04            BRIEF HISTORY OF PRESENT ILLNESS:  From prior notes,   \"Heron Mars is a 69 y.o. male with h/o HTN, HL, DM2, SRUTHI on CPAP, chronic systolic heart failure, stage D, NYHA class IV symptoms, non-ischemic cardiomyopathy, LVEF 20% with LVEDD 6.2, RV dysfunciton, TAPSE 1.22, TBili 1.5 likely from cardiac congestion, recent cardiac arrest s/p ICD (1/2013, [employer] followed by [facility]), coronary artery disease s/p multiple interventions s/p 4V CABG (8/2012), [facility] (7/2019) severe stenosis of LIMA to LAD anastomosis site, SVG graft to OM is occluded, SVG graft to RCA 40-50%, LAD occluded proximally, severe proximal LCx, RCA is the long . PET (6/2019) EF 24% with anterior lateral and inferior reversible defect. Anemia, microcytic with iron deficiency, DVT with filter. Patient was referred to [facility] by Dr. Lisbeth Schwartz in 2021 for evaluation of his candidacy for advanced therapies. Patient agreed to inpatient initiation of palliative infusion of chronic inotropes and evaluation for LVAD-DT. Patient was admitted 5/2-5/25/21. Patient was started on milrinone infusion and had first part of LVAD evaluation completed. Right and left heart cath on 5/24/21 showed severe diffuse native vessel CAD, with patent grafts (LIMA to D2, SVG to RCA). Pt was found to have pulmonary nodule during workup. Antiplatelet therapy was held during hospitalization and after discharge due to anticipated pulmonary nodule biopsy, bone marrow biopsy and EGD/C-Scope as part of LVAD workup. Pt found to have adenocarcinoma of the lung, and so LVAD was deferred pending treatment. Patient completed radiation treatment for adenocarcinoma of the lung. Hem-onc cleared patient for VAD implant with 90% 2 year prognosis. He presented to [facility] on 4/3 for admission for planned LVAD implant which he underwent on 4/5/22. Was re-do sternotomy, bleeding intra-op, required cryo, k-centra, FFP, Plt, and cellsaver in OR. Had RV dysfunction noted intra-op; requiring lower VAD speed and dual inotrope support. 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 He also had issues with significant CT drainage requiring prolonged placement.   He was eventually discharged home on POD 29 with GDMT (no BB due to RV failure) with [facility] SN/PT and OT. He had persistent left pleural effusion and underwent thoracentesis in IR on 6/9/22 with 1.4L removed. \"    Doing well since         REVIEW OF SYSTEMS:  Review of Symptoms:  Review of Systems   Constitutional:  Negative for chills, fever and malaise/fatigue. HENT:  Negative for ear pain and sore throat. Eyes:  Negative for blurred vision and double vision. Respiratory:  Negative for cough, shortness of breath and wheezing. Cardiovascular:  Positive for leg swelling. Negative for chest pain, palpitations, orthopnea and PND. Gastrointestinal:  Negative for abdominal pain, constipation, diarrhea, nausea and vomiting. Genitourinary:  Negative for dysuria, frequency and urgency. Musculoskeletal:  Negative for falls. Skin:  Negative for rash. Neurological:  Negative for dizziness and headaches. Endo/Heme/Allergies:  Does not bruise/bleed easily. Psychiatric/Behavioral:  The patient does not have insomnia.                PHYSICAL EXAM:  Visit Vitals  BP (!) 100/0 Comment: strong palpable pulse   Pulse (!) 36   Resp 18   Wt 209 lb 6.4 oz (95 kg)   SpO2 98%   BMI 27.63 kg/m²        LVAD   Pump Speed (RPM): 4800  Pump Flow (LPM): 3.9  PI (Pulsitility Index): 7  Power: 3.2  Testing  Alarms Reviewed: Yes  Back up SC speed: 4800  Back up Low Speed Limit: 4400      Physical Assessment:   General Appearance: alert, cooperative, pleasant, elderly AAM sitting in chair in NAD appears stated age  Eyes: sclera anicteric  Mouth/Throat: moist mucous membranes; oral pharynx clear  Neck: supple; +JVD  Pulmonary:  clear to auscultation bilaterally; good effort;   Cardiovascular: armani regular rate and rhythm; VAD hum;  quiet heart sounds   Abdomen: soft, non-tender, non-distended; bowel sounds normal  Musculoskeletal: no swelling or deformity; moves all extremities  Extremities: 1+edema L>R; palpable distal pulses   Skin: warm and dry  Neuro: grossly normal  Psych: normal mood and affect given the setting         PAST MEDICAL HISTORY:  Past Medical History:   Diagnosis Date    CAD (coronary artery disease) '90 '97, '13 x 2    MI, code ice in 2013 at [facility]    Calculus of kidney     Cardiac arrest ([facility] [address] 2/4/2013    Colonic polyps     Congestive heart failure, unspecified     Diabetes ([facility] [address]     Gastritis     Hypercholesterolemia     Sleep apnea        PAST SURGICAL HISTORY:  Past Surgical History:   Procedure Laterality Date    COLONOSCOPY  04/06/2011    16, due 21    COLONOSCOPY N/A 3/2/2022    COLONOSCOPY    :- performed by Elle Perez MD at [facility] ENDOSCOPY    ENDOSCOPY, COLON, DIAGNOSTIC      11, due 16    HX CORONARY ARTERY BYPASS GRAFT  8/22/12    x 4 vessel by MISSY Ramirez    HX HEENT      LASIK    HX PACEMAKER PLACEMENT  1/30/13    IL UNLISTED PROCEDURE CARDIAC SURGERY  2012    x 4 vessels       FAMILY HISTORY:  Family History   Problem Relation Age of Onset    Heart Disease Mother         MI    Heart Disease Father         CAD & PVD    Lung Disease Father     Cancer Father         lung    Diabetes Maternal Grandmother     Heart Disease Other         CAD    Stroke Sister        SOCIAL HISTORY:  Social History     Socioeconomic History    Marital status:    Tobacco Use    Smoking status: Never    Smokeless tobacco: Never   Vaping Use    Vaping Use: Never used   Substance and Sexual Activity    Alcohol use:  Yes     Alcohol/week: 0.0 standard drinks     Comment:  VERY RARE    Drug use: No    Sexual activity: Not Currently       LABORATORY RESULTS:  Labs Latest Ref Rng & Units 1/17/2023 12/19/2022   WBC 3.4 - 10.8 x10E3/uL 3.8 2.4(LL)   RBC 4.14 - 5.80 x10E6/uL 5.86(H) 5.74   Hemoglobin 13.0 - 17.7 g/dL 14.7 14.1   Hematocrit 37.5 - 51.0 % 46.9 44.1   MCV 79 - 97 fL 80 77(L)   Platelets 842 - 292 Q93C6/ 180   Albumin 3.7 - 4.7 g/dL 4.2 4.2   Calcium 8.6 - 10.2 mg/dL 9.2 9.3   Glucose 70 - 99 mg/dL 112(H) 112(H)   BUN 8 - 27 mg/dL 20 20   Creatinine 0.76 - 1.27 mg/dL 1.21 1.22   Sodium 134 - 144 mmol/L 139 140   Potassium 3.5 - 5.2 mmol/L 4.9 4.6    - 224 IU/L 186 163   Some recent data might be hidden       ALLERGY:  Allergies   Allergen Reactions    Oxycodone Anaphylaxis    Pcn [Penicillins] Hives        CURRENT MEDICATIONS:    Current Outpatient Medications:     bumetanide (BUMEX) 2 mg tablet, Take 1 mg by mouth daily. , Disp: , Rfl:     docusate sodium (COLACE) 100 mg capsule, Take 100 mg by mouth daily. , Disp: , Rfl:     eplerenone (INSPRA) 25 mg tablet, TAKE 1 TABLET BY MOUTH DAILY, Disp: 90 Tablet, Rfl: 1    famotidine (PEPCID) 20 mg tablet, TAKE 1 TABLET BY MOUTH EVERY 12 HOURS (Patient taking differently: as needed.), Disp: 60 Tablet, Rfl: 2    empagliflozin (JARDIANCE) 25 mg tablet, Take 0.5 Tablets by mouth daily. (Patient taking differently: Take 10 mg by mouth daily.), Disp: 45 Tablet, Rfl: 3    sacubitriL-valsartan (Entresto) 24-26 mg tablet, Take 1 Tablet by mouth two (2) times a day., Disp: 60 Tablet, Rfl: 11    magnesium oxide (MAG-OX) 400 mg tablet, TAKE 1 TABLET BY MOUTH DAILY, Disp: 30 Tablet, Rfl: 3    hydrALAZINE (APRESOLINE) 10 mg tablet, Take 1 Tablet by mouth three (3) times daily. , Disp: 290 Tablet, Rfl: 3    ucxsbbdmt-[place]-odwkpbdg-guaifen (Mucinex Fast-Max Cold-Flu) 10- mg/20 mL liqd, Take 20 mL by mouth every four (4) hours as needed for Cough. Take 20 mL by mouth every 4 hours as needed for cough, Disp: , Rfl:     tamsulosin (FLOMAX) 0.4 mg capsule, Take 1 Capsule by mouth daily. , Disp: 30 Capsule, Rfl: 2    warfarin (COUMADIN) 2.5 mg tablet, Take 2 Tablets by mouth daily. , Disp: 90 Tablet, Rfl: 2    potassium chloride SR (K-TAB) 20 mEq tablet, Take 2 Tablets by mouth two (2) times a day. (Patient taking differently: Take 10 mEq by mouth two (2) times a day. Four tablets twice daily), Disp: 360 Tablet, Rfl: 1    metFORMIN (GLUCOPHAGE) 500 mg tablet, Take 1 Tablet by mouth two (2) times daily (with meals). , Disp: 180 Tablet, Rfl: 3    acetaminophen (TYLENOL) 500 mg tablet, Take 500 mg by mouth every six (6) hours as needed for Fever or Pain., Disp: , Rfl:     aspirin delayed-release 81 mg tablet, Take 81 mg by mouth daily. , Disp: , Rfl:     PATIENT CARE TEAM:  Patient Care Team:  Rosales Carvalho MD as PCP - General (Internal Medicine Physician)  Rosales Carvalho MD as PCP - [facility]  Van Chauhan MD as Physician (Cardiovascular Disease Physician)  Gabe Duran MD as Physician (Gastroenterology)  Leonid [place] MD as Physician (Orthopedic Surgery)  Savi Enriquez MD as Physician (Ophthalmology)  Hua Juan MD (Cardiovascular Disease Physician)  Divya Haynes MD (Cardiovascular Disease Physician)     Thank you for your referral and allowing me to participate in this patient's care. Francesca Daniel NP  [address]  Office [phone]  Fax [phone]    On this date 2/14/2023, I have spent a total time of 35 minutes personally reviewing new vitals, test results, notes from recent visits, face to face encounter/physical exam of patient, reviewing VAD interrogation results, writing orders, performing medical decision making, counseling patient, and documenting.

## 2023-02-14 NOTE — PATIENT INSTRUCTIONS
Medication changes:    INCREASE bumex to to one full pill for 4 days. Notify [facility] if you become dizzy or light headed. Please take this to your pharmacy to notify them of the change in medications. Testing Ordered:    Lab work has been drawn today. You will be contacted with any abnormal results requiring changes to your current plan of care. An EKG was performed in clinic    Call with your weight Monday    Other Recommendations:     Continue to change drive line exit site dressing 3 times a week using sterile technique,     Ensure you are drinking an adequate amount of water with a goal of 6-8 eight ounce glasses (1.5-2 liters) of fluid daily. Your urine should be clear and light yellow straw colored. Follow up in 1 month with Rudolph Mccloud      For further patient and caregiver resources as well as access to online LVAD support groups visit [email or web address]       Please bring your daily sheets and medications to your next appointment. Please monitor your weights daily upon waking and after using the bathroom. Keep a written records of your weights and bring to your next appointment. If you have a weight gain of 3 or more pounds overnight OR 5 or more pounds in one week please contact our office. Thank you for allowing us the privilege of being a part of your healthcare team! Please do not hesitate to contact our office at [phone] with any questions or concerns. Virtual Heart Failure [address] invites you to learn more about heart failure and to share your questions, ideas, and experiences with others. Each month, the Heart Failure Support Group features a new educational topic and a guest speaker, followed by an interactive discussion. Our Heart Failure Nurse Navigator will moderate each session. You will be able to participate by phone, tablet or computer through [employer].  This support group takes place on the 3rd Thursday of each month from 6:00-7:30PM. All individuals living with heart failure and their caregivers are welcome to join. If you are interested in participating, please contact us at [email or web address] and you will be sent the link to join the [employer].

## 2023-02-14 NOTE — PROGRESS NOTES
Mr. Sivan Mitchell was seen today in clinic for a visit with Art Chou. Patient denied s/s of driveline infection or driveline trauma (including  drops). Denies LVAD alarms at home, last 6 alarm review showed two short low flows on 02/10. Provider updated. Pulse noted to be 36 taken manually. EKG performed and provider reviewed. LVAD interrogation completed in clinic, results reported to provider. See flow sheet for details. All orders entered per VORB. All provider instructions placed in AVS and reviewed with patient and wife. Both verbalized understanding. Per provider patient to return in 1 week for nurse visit for EKG.

## 2023-02-15 ENCOUNTER — TELEPHONE ANTICOAG (OUTPATIENT)
Dept: CARDIOLOGY CLINIC | Age: 78
End: 2023-02-15

## 2023-02-15 LAB
ALBUMIN SERPL-MCNC: 3.9 G/DL (ref 3.7–4.7)
ALBUMIN/GLOB SERPL: 1.5 {RATIO} (ref 1.2–2.2)
ALP SERPL-CCNC: 130 IU/L (ref 44–121)
ALT SERPL-CCNC: 8 IU/L (ref 0–44)
AST SERPL-CCNC: 36 IU/L (ref 0–40)
BILIRUB SERPL-MCNC: 0.3 MG/DL (ref 0–1.2)
BUN SERPL-MCNC: 25 MG/DL (ref 8–27)
BUN/CREAT SERPL: 21 (ref 10–24)
CALCIUM SERPL-MCNC: 8.8 MG/DL (ref 8.6–10.2)
CHLORIDE SERPL-SCNC: 101 MMOL/L (ref 96–106)
CO2 SERPL-SCNC: 24 MMOL/L (ref 20–29)
CREAT SERPL-MCNC: 1.2 MG/DL (ref 0.76–1.27)
EGFRCR SERPLBLD CKD-EPI 2021: 62 ML/MIN/1.73
ERYTHROCYTE [DISTWIDTH] IN BLOOD BY AUTOMATED COUNT: 16.3 % (ref 11.6–15.4)
GLOBULIN SER CALC-MCNC: 2.6 G/DL (ref 1.5–4.5)
GLUCOSE SERPL-MCNC: 172 MG/DL (ref 70–99)
HCT VFR BLD AUTO: 42.8 % (ref 37.5–51)
HGB BLD-MCNC: 13.2 G/DL (ref 13–17.7)
INR PPP: 2 (ref 0.9–1.2)
LDH SERPL L TO P-CCNC: 163 IU/L (ref 121–224)
MAGNESIUM SERPL-MCNC: 2.3 MG/DL (ref 1.6–2.3)
MCH RBC QN AUTO: 24.8 PG (ref 26.6–33)
MCHC RBC AUTO-ENTMCNC: 30.8 G/DL (ref 31.5–35.7)
MCV RBC AUTO: 81 FL (ref 79–97)
NT-PROBNP SERPL-MCNC: 826 PG/ML (ref 0–486)
PLATELET # BLD AUTO: 180 X10E3/UL (ref 150–450)
POTASSIUM SERPL-SCNC: 4.5 MMOL/L (ref 3.5–5.2)
PROT SERPL-MCNC: 6.5 G/DL (ref 6–8.5)
PROTHROMBIN TIME: 20.1 SEC (ref 9.1–12)
RBC # BLD AUTO: 5.32 X10E6/UL (ref 4.14–5.8)
SODIUM SERPL-SCNC: 137 MMOL/L (ref 134–144)
WBC # BLD AUTO: 3.3 X10E3/UL (ref 3.4–10.8)

## 2023-02-17 ENCOUNTER — TELEPHONE (OUTPATIENT)
Dept: CARDIOLOGY CLINIC | Age: 78
End: 2023-02-17

## 2023-02-17 NOTE — TELEPHONE ENCOUNTER
Patient's wife called stating they received a [facility] bill for their son (same name as father) who is not a patient with [facility]. Also received call from [facility] requesting call back regarding bill. Returned call to [facility] services and who stated they will submit a correction to their billing department. Notified patient's wife.

## 2023-02-22 ENCOUNTER — CLINICAL SUPPORT (OUTPATIENT)
Dept: CARDIOLOGY CLINIC | Age: 78
End: 2023-02-22
Payer: MEDICARE

## 2023-02-22 ENCOUNTER — HOSPITAL ENCOUNTER (OUTPATIENT)
Dept: CT IMAGING | Age: 78
Discharge: HOME | End: 2023-02-22
Attending: INTERNAL MEDICINE
Payer: MEDICARE

## 2023-02-22 VITALS
HEART RATE: 71 BPM | WEIGHT: 210 LBS | OXYGEN SATURATION: 96 % | BODY MASS INDEX: 27.71 KG/M2 | RESPIRATION RATE: 18 BRPM | SYSTOLIC BLOOD PRESSURE: 90 MMHG

## 2023-02-22 DIAGNOSIS — Z95.811 LVAD (LEFT VENTRICULAR ASSIST DEVICE) PRESENT (HCC): Primary | ICD-10-CM

## 2023-02-22 DIAGNOSIS — C34.12 MALIGNANT NEOPLASM OF UPPER LOBE, LEFT BRONCHUS OR LUNG (HCC): ICD-10-CM

## 2023-02-22 DIAGNOSIS — D50.9 IRON DEFICIENCY ANEMIA, UNSPECIFIED: ICD-10-CM

## 2023-02-22 PROCEDURE — 71260 CT THORAX DX C+: CPT

## 2023-02-22 PROCEDURE — 93000 ELECTROCARDIOGRAM COMPLETE: CPT | Performed by: NURSE PRACTITIONER

## 2023-02-22 PROCEDURE — 99211 OFF/OP EST MAY X REQ PHY/QHP: CPT | Performed by: NURSE PRACTITIONER

## 2023-02-22 PROCEDURE — 93750 INTERROGATION VAD IN PERSON: CPT | Performed by: NURSE PRACTITIONER

## 2023-02-22 PROCEDURE — 74011000636 HC RX REV CODE- 636: Performed by: RADIOLOGY

## 2023-02-22 RX ADMIN — IOPAMIDOL 80 ML: 755 INJECTION, SOLUTION INTRAVENOUS at 16:41

## 2023-02-22 NOTE — PATIENT INSTRUCTIONS
Medication changes:    NONE today    Resume prior dosing of bumex (1mg daily), you may hold dose tonight     Please take this to your pharmacy to notify them of the change in medications. Testing Ordered:    EKG performed in clinic today     Other Recommendations:     Continue to change drive line exit site dressing 3 times a week using sterile technique,     Ensure you are drinking an adequate amount of water with a goal of 6-8 eight ounce glasses (1.5-2 liters) of fluid daily. Your urine should be clear and light yellow straw colored. Follow up as previously scheduled with Rudolph Mccloud      For further patient and caregiver resources as well as access to online LVAD support groups visit [email or web address]       Please bring your daily sheets and medications to your next appointment. Please monitor your weights daily upon waking and after using the bathroom. Keep a written records of your weights and bring to your next appointment. If you have a weight gain of 3 or more pounds overnight OR 5 or more pounds in one week please contact our office. Thank you for allowing us the privilege of being a part of your healthcare team! Please do not hesitate to contact our office at [phone] with any questions or concerns. Virtual Heart Failure [address] invites you to learn more about heart failure and to share your questions, ideas, and experiences with others. Each month, the Heart Failure Support Group features a new educational topic and a guest speaker, followed by an interactive discussion. Our Heart Failure Nurse Navigator will moderate each session. You will be able to participate by phone, tablet or computer through [address] [place] [address]. This support group takes place on the 3rd Thursday of each month from 6:00-7:30PM. All individuals living with heart failure and their caregivers are welcome to join.      If you are interested in participating, please contact us at [email or web address] and you will be sent the link to join the [employer].

## 2023-02-22 NOTE — PROGRESS NOTES
Marcos Clark is a 69 y.o. male with a history of ICM with Heartmate 3 implant date of 04/5/22. Patient was seen in clinic for routine follow up at which time it was noted he was experiencing power cable disconnect alarms, clips replaced  to ensure safe operation of device per  guidelines.

## 2023-02-22 NOTE — PROGRESS NOTES
[facility] in [place], [place]  LVAD Outpatient Nurse Visit    Chief Complaint   Patient presents with    Follow-up     LVAD- nurse visit for EKG and weight check       Visit Vitals  BP (!) 90/0   Pulse 71   Resp 18   Wt 210 lb (95.3 kg)   SpO2 96%   BMI 27.71 kg/m²        LVAD   Pump Speed (RPM): 4800  Pump Flow (LPM): 3.9  MAP: 90  PI (Pulsitility Index): 6.6  Power: 3.2   Test: Yes  Back Up  at Bedside & Labeled: Yes  Driveline Site Care: No  Driveline Dressing: Clean, Dry, and Intact  Outpatient: Yes  MAP in Therapeutic Range (Outpatient): Yes  Testing  Alarms Reviewed: Yes  Back up SC speed: 4800  Back up Low Speed Limit: 4400  Emergency Equipment with Patient?: Yes  Emergency procedures reviewed?: Yes  Drive line site inspected?: Yes  Drive line intergrity inspected?: Yes  Drive line dressing changed?: No      Amy Rankin is a 69 y.o. male with a history of severe ischemic cardiomyopathy with Heartmate 3 implant date of 4/5/22 . Patient was seen in clinic for nurse visit for ekg and weight check. Also has been noting some alarms on LVAD. LVAD interrogation completed. Notable for few power cable disconnect alarms. Reports one episode of dizzyness when standing up fast, no shortness of breath,  MAP 90. Medications reviewed. Reviewed all information including EKGs with Marely Moffett who ordered V.O.R.B, patient can resume taking 1mg of diuretic daily. Pt clips replaced. All instructions placed in after visit summary and reviewed with patient. Education provided on monitoring for dizziness and proper hydration. Time given to ask questions. Patient verbalized understanding and had no further questions.

## 2023-03-01 ENCOUNTER — TELEPHONE (OUTPATIENT)
Dept: CARDIOLOGY CLINIC | Age: 78
End: 2023-03-01

## 2023-03-01 NOTE — TELEPHONE ENCOUNTER
Contacted patient via phone for INR reminder. Telephone Call RE:  INR Reminder      Outcome:     [x] Patient verbalizes understanding    [] Unable to reach   [] Left message              [x]     Spoke with patient in reference to INR reminder for tomorrow. Patient agrees to conduct INR and self report.     Keisha Teran , [address] [place] [address]

## 2023-03-02 ENCOUNTER — TELEPHONE ANTICOAG (OUTPATIENT)
Dept: CARDIOLOGY CLINIC | Age: 78
End: 2023-03-02

## 2023-03-02 LAB — INR, EXTERNAL: 2.6

## 2023-03-15 ENCOUNTER — TELEPHONE (OUTPATIENT)
Dept: CARDIOLOGY CLINIC | Age: 78
End: 2023-03-15

## 2023-03-15 DIAGNOSIS — Z79.899 ENCOUNTER FOR LONG-TERM (CURRENT) USE OF OTHER MEDICATIONS: ICD-10-CM

## 2023-03-15 DIAGNOSIS — Z95.811 LVAD (LEFT VENTRICULAR ASSIST DEVICE) PRESENT (HCC): Primary | ICD-10-CM

## 2023-03-15 DIAGNOSIS — Z79.01 CHRONIC ANTICOAGULATION: ICD-10-CM

## 2023-03-15 DIAGNOSIS — Z79.899 HIGH RISK MEDICATION USE: ICD-10-CM

## 2023-03-15 DIAGNOSIS — R06.02 SHORTNESS OF BREATH: ICD-10-CM

## 2023-03-15 NOTE — TELEPHONE ENCOUNTER
Contacted patient via phone for lab reminder. Telephone Call RE:  Lab Reminder      Outcome:     [] Patient verbalizes understanding    [] Unable to reach   [] Left message              [x]   Patient's wife verbalized understanding.  Labs faxed to [address] [facility] , [address] [place] [address]

## 2023-03-17 ENCOUNTER — TELEPHONE ANTICOAG (OUTPATIENT)
Dept: CARDIOLOGY CLINIC | Age: 78
End: 2023-03-17

## 2023-03-17 LAB
ALBUMIN SERPL-MCNC: 4.1 G/DL (ref 3.7–4.7)
ALBUMIN/GLOB SERPL: 1.6 {RATIO} (ref 1.2–2.2)
ALP SERPL-CCNC: 138 IU/L (ref 44–121)
ALT SERPL-CCNC: 6 IU/L (ref 0–44)
AST SERPL-CCNC: 42 IU/L (ref 0–40)
BILIRUB SERPL-MCNC: 0.3 MG/DL (ref 0–1.2)
BUN SERPL-MCNC: 19 MG/DL (ref 8–27)
BUN/CREAT SERPL: 16 (ref 10–24)
CALCIUM SERPL-MCNC: 9.2 MG/DL (ref 8.6–10.2)
CHLORIDE SERPL-SCNC: 102 MMOL/L (ref 96–106)
CO2 SERPL-SCNC: 26 MMOL/L (ref 20–29)
CREAT SERPL-MCNC: 1.17 MG/DL (ref 0.76–1.27)
EGFRCR SERPLBLD CKD-EPI 2021: 64 ML/MIN/1.73
ERYTHROCYTE [DISTWIDTH] IN BLOOD BY AUTOMATED COUNT: 15.8 % (ref 11.6–15.4)
GLOBULIN SER CALC-MCNC: 2.6 G/DL (ref 1.5–4.5)
GLUCOSE SERPL-MCNC: 100 MG/DL (ref 70–99)
HCT VFR BLD AUTO: 44.9 % (ref 37.5–51)
HGB BLD-MCNC: 13.9 G/DL (ref 13–17.7)
INR PPP: 2.4 (ref 0.9–1.2)
LDH SERPL L TO P-CCNC: 188 IU/L (ref 121–224)
MAGNESIUM SERPL-MCNC: 2.3 MG/DL (ref 1.6–2.3)
MCH RBC QN AUTO: 25.2 PG (ref 26.6–33)
MCHC RBC AUTO-ENTMCNC: 31 G/DL (ref 31.5–35.7)
MCV RBC AUTO: 82 FL (ref 79–97)
NT-PROBNP SERPL-MCNC: 776 PG/ML (ref 0–486)
PLATELET # BLD AUTO: 177 X10E3/UL (ref 150–450)
POTASSIUM SERPL-SCNC: 4.7 MMOL/L (ref 3.5–5.2)
PROT SERPL-MCNC: 6.7 G/DL (ref 6–8.5)
PROTHROMBIN TIME: 23.8 SEC (ref 9.1–12)
RBC # BLD AUTO: 5.51 X10E6/UL (ref 4.14–5.8)
SODIUM SERPL-SCNC: 140 MMOL/L (ref 134–144)
WBC # BLD AUTO: 3.5 X10E3/UL (ref 3.4–10.8)

## 2023-03-20 ENCOUNTER — TELEPHONE (OUTPATIENT)
Dept: CARDIOLOGY CLINIC | Age: 78
End: 2023-03-20

## 2023-03-20 NOTE — TELEPHONE ENCOUNTER
Telephone Call RE:  Appointment reminder     Outcome:     [x] Patient confirmed appointment   [] Patient rescheduled appointment for    [] Unable to reach  [] Left message              [] Other:        Spouse confirmed appt.

## 2023-03-21 ENCOUNTER — OFFICE VISIT (OUTPATIENT)
Dept: CARDIOLOGY CLINIC | Age: 78
End: 2023-03-21
Payer: MEDICARE

## 2023-03-21 VITALS
WEIGHT: 214 LBS | BODY MASS INDEX: 28.36 KG/M2 | TEMPERATURE: 97.9 F | HEIGHT: 73 IN | SYSTOLIC BLOOD PRESSURE: 86 MMHG | RESPIRATION RATE: 18 BRPM | OXYGEN SATURATION: 99 % | HEART RATE: 58 BPM

## 2023-03-21 DIAGNOSIS — E78.2 MIXED HYPERLIPIDEMIA: ICD-10-CM

## 2023-03-21 DIAGNOSIS — Z79.899 ENCOUNTER FOR LONG-TERM (CURRENT) USE OF OTHER MEDICATIONS: ICD-10-CM

## 2023-03-21 DIAGNOSIS — E55.9 VITAMIN D INSUFFICIENCY: ICD-10-CM

## 2023-03-21 DIAGNOSIS — R06.02 SHORTNESS OF BREATH: ICD-10-CM

## 2023-03-21 DIAGNOSIS — I50.22 CHRONIC SYSTOLIC HEART FAILURE (HCC): Primary | ICD-10-CM

## 2023-03-21 DIAGNOSIS — Z12.5 SCREENING PSA (PROSTATE SPECIFIC ANTIGEN): ICD-10-CM

## 2023-03-21 DIAGNOSIS — Z79.899 HIGH RISK MEDICATION USE: ICD-10-CM

## 2023-03-21 DIAGNOSIS — E11.51 TYPE 2 DIABETES, CONTROLLED, WITH PERIPHERAL CIRCULATORY DISORDER (HCC): ICD-10-CM

## 2023-03-21 DIAGNOSIS — Z79.01 CHRONIC ANTICOAGULATION: ICD-10-CM

## 2023-03-21 DIAGNOSIS — Z95.811 LVAD (LEFT VENTRICULAR ASSIST DEVICE) PRESENT (HCC): ICD-10-CM

## 2023-03-21 PROCEDURE — 93750 INTERROGATION VAD IN PERSON: CPT | Performed by: NURSE PRACTITIONER

## 2023-03-21 PROCEDURE — 1101F PT FALLS ASSESS-DOCD LE1/YR: CPT | Performed by: NURSE PRACTITIONER

## 2023-03-21 PROCEDURE — G8417 CALC BMI ABV UP PARAM F/U: HCPCS | Performed by: NURSE PRACTITIONER

## 2023-03-21 PROCEDURE — 99213 OFFICE O/P EST LOW 20 MIN: CPT | Performed by: NURSE PRACTITIONER

## 2023-03-21 PROCEDURE — G8432 DEP SCR NOT DOC, RNG: HCPCS | Performed by: NURSE PRACTITIONER

## 2023-03-21 PROCEDURE — 1123F ACP DISCUSS/DSCN MKR DOCD: CPT | Performed by: NURSE PRACTITIONER

## 2023-03-21 PROCEDURE — G8427 DOCREV CUR MEDS BY ELIG CLIN: HCPCS | Performed by: NURSE PRACTITIONER

## 2023-03-21 PROCEDURE — G8536 NO DOC ELDER MAL SCRN: HCPCS | Performed by: NURSE PRACTITIONER

## 2023-03-21 NOTE — PROGRESS NOTES
[facility] in [facility], [address] Note    Patient name: Almita Mcleod  Patient : 1945  Patient MRN: [id number]  Date of service: 23    Primary care physician: Nery Castro MD    LVAD cardiologist: Ester Adkins MD    CHIEF COMPLAINT:  Chief Complaint   Patient presents with    Follow-up     LVAD         PLAN OF CARE:  69 y.o. male with severe ischemic cardiomyopathy, LVEF 15-20% s/p HM3 LVAD implant as DT on 22 by Dr. Annamaria Cole c/b intraoperative bleeding requiring multiple blood products and subsequent RV failure with severe TR requiring prolonged dobutamine wean and long term CT support, followed by left thoracentesis 22 by IR; 1.4L removed and no further complications; doing well   Annual visit due in April        RECOMMENDATIONS:  Continue speed 4800rpms, low speed 4400  No BB due to RV failure   Continue Entresto 24/26mg BID; Cont Eplerenone 25mg daily  Continue bumex 1mg daily. Continue Jardiance 10mg daily  Continue hydralazine 10mg TID  Continue warfarin, goal INR 2-3 see tracker for details   ASA 81mg daily  Continue Flomax 0.4mg daily  Cont pepcid  Monthly LVAD labs today   Echo completed   6mw due in April   Driveline dressing changes 3 times a week  CPAP qHS, encouraged to use nightly   [facility]  Advanced care plan forms on file   Repeat Chest CT annually for previous lung nodules, next in 2024  Up to date on Flu/PNA/COVID vaccines   Follow up with EP:  Dr. Roslyn Kehr   Has follow up with heme/onc: Dr. Brunilda Paris and Dispositions    Return in about 4 weeks (around 2023).            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 Patient states ICD was deactivated due to needing to upgrade his wires and he didn't want another procedure at the time. Coronary artery disease  S/p multiple interventions  S/p 4V CABG (8/2012)  LHC (7/2019) severe stenosis of LIMA to LAD anastomosis site, SVG graft to OM is occluded, SVG graft to RCA 40-50%, LAD occluded proximally, severe proximal LCx, RCA is the long . PET (6/2019) EF 24% with anterior lateral and inferior reversible defect  Cardiac risk factors  HTN  HL  DM2  SRUTHI on CPAP  Mild carotid stenosis  Anemia, microcytic  Iron deficiency  DVT with filter  Pulmonary nodules/history of lung cancer s/p radiation 9/21   Dysphagia      LIFE GOALS:  Patient life goals discussed. CARDIAC IMAGING:    Echo (12/6/22) EF 20-25%; RV dilated with reduced systolic function; mod TR    Echo (6/28/22): LVEF 25 - 30%. Mildly increased wall thickness. Severe global hypokinesis present     Echo (6/2/22) LVEF 20-25%, mild MR, mod to severe TR, LVIDd 4.9cm, RVIDd 6cm, TAPSE 0.9cm      Echo (5/4/22) LVEF 15-20%, mod TR, LVIDd 5.3 cm, TAPSE 0.9 cm      Echo (4/26/22) - LVIDd 4.9 cm, TAPSE 0.5 cm,      Echo (4/18/22)- LVEF 15-20%, severe TR, TAPSE 0.5cm, RVIDd 6cm      Echo (4/8/22): LVEF 10-15%. Severe global hypokinesis present. 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 Echo (3/2/22): LVEF 10-15%; Mod-severe MR      Echo (11/23/21): LVEF 15-20%; Mod-severe, MR, mod-TR     Echo (5/20/21): LVEF is 20 - 25%. Severely and globally reduced systolic function. Severe (grade 4) left ventricular diastolic dysfunction. LHC (5/24/21) Native Coronaries: LM - moderate to severe distal disease 50%. % prox occluded (), heavily calcified, LCx: 80% proximal stenosis. OM1 is  (seen filling faintly via collaterals). OM2 99% stenosis.  RCA: 100% proximal occluded. LIMA to LAD: patent, supplies only a diagonal branch (probably D2). The LAD distal to the bifurcation is 100% occluded () and fills via right to left collaterals off the SVG to RCA. SVG to R-PDA: patent with moderate diffuse irregularities but no obstructive disease in the graft. No appealing interventional targets. EKG (2/14/23):  SB with 1st degree AVB       6 Min Walk Report 9/27/2022 5/4/2022 5/2/2022 11/12/2021 7/6/2021 5/20/2021   (PRE) HR 70 82 82 88 98 74   (PRE) O2 Sat 96 - 98 100 - 99   (POST) HR 79 91 88 102 - 81   (POST) O2 Sat 100 - 98 99 98% on Ra 99   Distance in Meters 336.8 191.41 181.36 386. 62 - 1158.24            BRIEF HISTORY OF PRESENT ILLNESS:  Emerson Lozada is a 69 y.o. male with h/o HTN, HL, DM2, SRUTHI on CPAP, chronic systolic heart failure, stage D, NYHA class IV symptoms, non-ischemic cardiomyopathy, LVEF 20% with LVEDD 6.2, RV dysfunciton, TAPSE 1.22, TBili 1.5 likely from cardiac congestion, recent cardiac arrest s/p ICD (1/2013, [address] [facility]), coronary artery disease s/p multiple interventions s/p 4V CABG (8/2012), C (7/2019) severe stenosis of LIMA to LAD anastomosis site, SVG graft to OM is occluded, SVG graft to RCA 40-50%, LAD occluded proximally, severe proximal LCx, RCA is the long . PET (6/2019) EF 24% with anterior lateral and inferior reversible defect. Anemia, microcytic with iron deficiency, DVT with filter. Patient was referred to [facility] by Dr. Mikayla Carrera in 2021 for evaluation of his candidacy for advanced therapies. Patient agreed to inpatient initiation of palliative infusion of chronic inotropes and evaluation for LVAD-DT. Patient was admitted 5/2-5/25/21. Patient was started on milrinone infusion and had first part of LVAD evaluation completed. Right and left heart cath on 5/24/21 showed severe diffuse native vessel CAD, with patent grafts (LIMA to D2, SVG to RCA).  Pt was found to have pulmonary nodule during workup. Antiplatelet therapy was held during hospitalization and after discharge due to anticipated pulmonary nodule biopsy, bone marrow biopsy and EGD/C-Scope as part of LVAD workup. Pt found to have adenocarcinoma of the lung, and so LVAD was deferred pending treatment. Patient completed radiation treatment for adenocarcinoma of the lung. Hem-onc cleared patient for VAD implant with 90% 2 year prognosis. He presented to [facility] on 4[facility]3 for admission for planned LVAD implant which he underwent on 4/5/22. Was re-do sternotomy, bleeding intra-op, required cryo, k-centra, FFP, Plt, and cellsaver in OR. Had RV dysfunction noted intra-op; requiring lower VAD speed and dual inotrope support. 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 He also had issues with significant CT drainage requiring prolonged placement. He was eventually discharged home on POD 29 with GDMT (no BB due to RV failure) [facility]. He had persistent left pleural effusion and underwent thoracentesis in IR on 6/9/22 with 1.4L removed. Interval History:  Patient presents today for LVAD follow up accompanied by his wife. He states he feels well today, he denies acute HF symptoms or issues with his VAD and drive line exit site. He stopped taking his crestor due to muscle aches but does take all of his other medications. He would like to increase his activity now that the weather is getting nicer and he continues to participate in cardiac rehab. He will have his annual visit next month. His wife is taking a trip and they will come up with a plan for his dressing changes. Recent chest CT shows stable lung nodule and improved lung lesion- will cont to follow those annually. REVIEW OF SYSTEMS[de-identified]  Review of Systems   Constitutional:  Negative for chills, fever and malaise/fatigue. HENT:  Negative for congestion. Eyes:  Negative for blurred vision and double vision.    Respiratory:  Negative for cough, shortness of breath and wheezing. Cardiovascular:  Negative for chest pain, palpitations, orthopnea, leg swelling and PND. Gastrointestinal:  Negative for abdominal pain, constipation, diarrhea, nausea and vomiting. Genitourinary:  Negative for dysuria, frequency and urgency. Musculoskeletal:  Negative for falls. Skin:  Negative for rash. Neurological:  Negative for dizziness and headaches. Endo/Heme/Allergies:  Does not bruise/bleed easily. Psychiatric/Behavioral:  Negative for depression. The patient does not have insomnia. PHYSICAL EXAM:  Visit Vitals  BP (!) 86/0 (BP 1 Location: Right upper arm, BP Patient Position: Sitting, BP Cuff Size: Large adult)   Pulse (!) 58   Temp 97.9 °F (36.6 °C) (Oral)   Resp 18   Ht 6' 1\" (1.854 m)   Wt 214 lb (97.1 kg)   SpO2 99%   BMI 28.23 kg/m²        LVAD   Pump Speed (RPM): 4800  Pump Flow (LPM): 3.9  MAP: 86  PI (Pulsitility Index): 7.7  Power: 3.2  Testing  Alarms Reviewed: Yes  Back up SC speed: 4800  Back up Low Speed Limit: 4400      Physical Assessment:   Limited due to virtual visit   Physical Exam  Vitals reviewed. Constitutional:       Appearance: He is normal weight. Pulmonary:      Effort: No respiratory distress. Musculoskeletal:         General: No swelling. Neurological:      General: No focal deficit present. Mental Status: He is alert and oriented to person, place, and time.              PAST MEDICAL HISTORY:  Past Medical History:   Diagnosis Date    CAD (coronary artery disease) '90  '97, '13 x 2    [place] code [place] in [place] [facility]    Calculus of kidney     Cardiac arrest ([facility] [address] 2/4/2013    Colonic polyps     Congestive heart failure, unspecified     Diabetes ([facility] [address]     Gastritis     Hypercholesterolemia     Sleep apnea        PAST SURGICAL HISTORY:  Past Surgical History:   Procedure Laterality Date    COLONOSCOPY  04/06/2011    16, due 21    COLONOSCOPY N/A 3/2/2022    COLONOSCOPY    :- performed by Micheline Tabor MD at [facility] ENDOSCOPY    ENDOSCOPY, COLON, DIAGNOSTIC      11, due 16    HX CORONARY ARTERY BYPASS GRAFT  8/22/12    x 4 vessel by MISSY CABRALES HEENT      LASIK    HX PACEMAKER PLACEMENT  1/30/13    AR UNLISTED PROCEDURE CARDIAC SURGERY  2012    x 4 vessels       FAMILY HISTORY:  Family History   Problem Relation Age of Onset    Heart Disease Mother         MI    Heart Disease Father         CAD & PVD    Lung Disease Father     Cancer Father         lung    Diabetes Maternal Grandmother     Heart Disease Other         CAD    Stroke Sister        SOCIAL HISTORY:  Social History     Socioeconomic History    Marital status:    Tobacco Use    Smoking status: Never    Smokeless tobacco: Never   Vaping Use    Vaping Use: Never used   Substance and Sexual Activity    Alcohol use: Yes     Alcohol/week: 0.0 standard drinks     Comment:  VERY RARE    Drug use: No    Sexual activity: Not Currently       LABORATORY RESULTS:  Labs Latest Ref Rng & Units 3/16/2023 2/14/2023   WBC 3.4 - 10.8 x10E3/uL 3.5 3. 3(L)   RBC 4.14 - 5.80 x10E6/uL 5.51 5.32   Hemoglobin 13.0 - 17.7 g/dL 13.9 13.2   Hematocrit 37.5 - 51.0 % 44.9 42.8   MCV 79 - 97 fL 82 81   Platelets 000 - 807 D68F5/ 180   Albumin 3.7 - 4.7 g/dL 4.1 3.9   Calcium 8.6 - 10.2 mg/dL 9.2 8.8   Glucose 70 - 99 mg/dL 100(H) 172(H)   BUN 8 - 27 mg/dL 19 25   Creatinine 0.76 - 1.27 mg/dL 1.17 1.20   Sodium 134 - 144 mmol/L 140 137   Potassium 3.5 - 5.2 mmol/L 4.7 4.5    - 224 IU/L 188 163   Some recent data might be hidden       ALLERGY:  Allergies   Allergen Reactions    Oxycodone Anaphylaxis    Pcn [Penicillins] Hives        CURRENT MEDICATIONS:    Current Outpatient Medications:     bumetanide (BUMEX) 2 mg tablet, Take 1 mg by mouth daily. , Disp: , Rfl:     docusate sodium (COLACE) 100 mg capsule, Take 100 mg by mouth daily. , Disp: , Rfl:     eplerenone (INSPRA) 25 mg tablet, TAKE 1 TABLET BY MOUTH DAILY, Disp: 90 Tablet, Rfl: 1    famotidine (PEPCID) 20 mg tablet, TAKE 1 TABLET BY MOUTH EVERY 12 HOURS (Patient taking differently: as needed.), Disp: 60 Tablet, Rfl: 2    empagliflozin (JARDIANCE) 25 mg tablet, Take 0.5 Tablets by mouth daily. , Disp: 45 Tablet, Rfl: 3    sacubitriL-valsartan (Entresto) 24-26 mg tablet, Take 1 Tablet by mouth two (2) times a day., Disp: 60 Tablet, Rfl: 11    magnesium oxide (MAG-OX) 400 mg tablet, TAKE 1 TABLET BY MOUTH DAILY, Disp: 30 Tablet, Rfl: 3    hydrALAZINE (APRESOLINE) 10 mg tablet, Take 1 Tablet by mouth three (3) times daily. , Disp: 290 Tablet, Rfl: 3    tamsulosin (FLOMAX) 0.4 mg capsule, Take 1 Capsule by mouth daily. , Disp: 30 Capsule, Rfl: 2    warfarin (COUMADIN) 2.5 mg tablet, Take 2 Tablets by mouth daily. , Disp: 90 Tablet, Rfl: 2    potassium chloride SR (K-TAB) 20 mEq tablet, Take 2 Tablets by mouth two (2) times a day. (Patient taking differently: Take 10 mEq by mouth two (2) times a day. Four tablets twice daily), Disp: 360 Tablet, Rfl: 1    metFORMIN (GLUCOPHAGE) 500 mg tablet, Take 1 Tablet by mouth two (2) times daily (with meals). , Disp: 180 Tablet, Rfl: 3    acetaminophen (TYLENOL) 500 mg tablet, Take 500 mg by mouth every six (6) hours as needed for Fever or Pain., Disp: , Rfl:     aspirin delayed-release 81 mg tablet, Take 81 mg by mouth daily. , Disp: , Rfl:     xfhcbevpi-VA-mnfrtbpp-guaifen (Mucinex Fast-Max Cold-Flu) 10- mg/20 mL liqd, Take 20 mL by mouth every four (4) hours as needed for Cough.  Take 20 mL by mouth every 4 hours as needed for cough, Disp: , Rfl:     PATIENT CARE TEAM:  Patient Care Team:  Katrina Gallagher MD as PCP - General (Internal Medicine Physician)  Katrina Gallagher MD as PCP - [facility] Provider  Rashad Garza MD as Physician (Cardiovascular Disease Physician)  Darrion Delgado MD as Physician (Gastroenterology)  Carolyn Kline MD as Physician (Orthopedic Surgery)  Wander Quinn MD as Physician (Ophthalmology)  Juan Carlos Vasquez MD (Cardiovascular Disease Physician)  Lidia Mariscal MD (Cardiovascular Disease Physician)     Thank you for your referral and allowing me to participate in this patient's care. Alisha Ortega NP  [address] [place] [address]  [address]  Office [phone]  Fax [phone]      [address] [place] [address], who was evaluated through a synchronous (real-time) audio-video encounter, and/or his healthcare decision maker, is aware that it is a billable service, which includes applicable co-pays, with coverage as determined by his insurance carrier. He provided verbal consent to proceed and patient identification was verified. This visit was conducted pursuant to the emergency declaration under the [facility] [address] [facility] waiver authority and the [employer] Act. A caregiver was present when appropriate. Ability to conduct physical exam was limited. The patient was located at:  Facility (Appt Department): [address] [address]  The provider was located at: Home: [address] [place] [address] [place], NP on 3/21/2023 at 1:21 PM    Time: 1:21pm-1:45pm

## 2023-03-21 NOTE — LETTER
3/21/2023    Patient: Garrett Renner   YOB: 1945   Date of Visit: 3/21/2023     Benja Qureshi, [address]    Dear Benja Qureshi MD,      Thank you for referring Mr. Paula Carter Sr to [facility] for evaluation. My notes for this consultation are attached. If you have questions, please do not hesitate to call me. I look forward to following your patient along with you.       Sincerely,    Aparna Ricci NP

## 2023-03-21 NOTE — PATIENT INSTRUCTIONS
Medication changes:    NONE today     Please take this to your pharmacy to notify them of the change in medications. Testing Ordered:    Lab work has been ordered today. Please present to to [facility] of your choice in April to have this completed. 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 Your urine should be clear and light yellow straw colored. Use adhesive remover to assist with preventing skin irritation. Follow up in 1 month with Rudolph Mccloud for your ANNUAL VISIT     For further patient and caregiver resources as well as access to online LVAD support groups visit [email or web address]       Please bring your daily sheets and medications to your next appointment. Please monitor your weights daily upon waking and after using the bathroom. Keep a written records of your weights and bring to your next appointment. If you have a weight gain of 3 or more pounds overnight OR 5 or more pounds in one week please contact our office. Thank you for allowing us the privilege of being a part of your healthcare team! Please do not hesitate to contact our office at [phone] with any questions or concerns. Virtual Heart Failure [address] invites you to learn more about heart failure and to share your questions, ideas, and experiences with others. Each month, the Heart Failure Support Group features a new educational topic and a guest speaker, followed by an interactive discussion. Our Heart Failure Nurse Navigator will moderate each session. You will be able to participate by phone, tablet or computer through [address] [place] [address].  This support group takes place on the 3rd Thursday of each month from 6:00-7:30PM. All individuals living with heart failure and their caregivers are welcome to join. If you are interested in participating, please contact us at [email or web address] and you will be sent the link to join the [employer].

## 2023-03-22 NOTE — PROGRESS NOTES
Mr. Shailesh Portillo was seen today in clinic for a visit with Romaine Segura     Patient denied s/s of driveline infection or driveline trauma (including  drops). Denies LVAD alarms at home. Driveline inspected for integrity. Pt had gauze under dressing to protect skin, notes that skin is irritated with dressing removal. Provided pt with adhesive remover and educated on importance of dressing integrity, he verbalized understanding. Above reported to provider. LVAD interrogation completed in clinic, results reported to provider. See flow sheet for details. All orders entered per VORB. All provider instructions placed in AVS and reviewed with patient. Educated the patient on upcoming annnual visit, lab slips provided to be done in April, expected follow up. reviewed questions. Patient verbalized understanding, denied questions at end of visit.

## 2023-03-29 ENCOUNTER — TELEPHONE (OUTPATIENT)
Dept: CARDIOLOGY CLINIC | Age: 78
End: 2023-03-29

## 2023-03-29 NOTE — TELEPHONE ENCOUNTER
Contacted patient via phone for INR reminder.     Telephone Call RE:  INR Reminder      Outcome:     [] Patient verbalizes understanding    [x] Unable to reach   [x] Left message              []     Left a message for patient in reference to INR reminder for tomorrow    [facility] , [address] [place] [address] Ame

## 2023-03-29 NOTE — TELEPHONE ENCOUNTER
Called pt's wife to notify of annual appt on April 20th at [address] [place] [address]. She confirmed they are able to take this appt. Reviewed bringing all equipment to appt. Pt's wife verbalized understanding.

## 2023-04-06 ENCOUNTER — TELEPHONE ANTICOAG (OUTPATIENT)
Dept: CARDIOLOGY CLINIC | Age: 78
End: 2023-04-06

## 2023-04-06 ENCOUNTER — TELEPHONE (OUTPATIENT)
Dept: CARDIOLOGY CLINIC | Age: 78
End: 2023-04-06

## 2023-04-19 ENCOUNTER — TELEPHONE (OUTPATIENT)
Dept: CARDIOLOGY CLINIC | Age: 78
End: 2023-04-19

## 2023-04-19 ENCOUNTER — TELEPHONE (OUTPATIENT)
Age: 78
End: 2023-04-19

## 2023-04-19 PROBLEM — I25.5 ISCHEMIC CARDIOMYOPATHY: Status: ACTIVE | Noted: 2023-04-19

## 2023-04-19 NOTE — TELEPHONE ENCOUNTER
Contacted patient via phone for INR reminder. Telephone Call RE:  INR Reminder      Outcome:     [x] Patient verbalizes understanding    [] Unable to reach   [] Left message              [x]     Spoke with patient in reference to INR reminder for tomorrow. Patient agrees to conduct INR and self report.     Cecilia Jane , [address] [place] [address]

## 2023-04-19 NOTE — PROGRESS NOTES
[address] [facility], [address] [place] [address] Avenue [address] Note    Patient name: Veronica Lovell  Patient : 1945  Patient MRN: [id number]  Date of service: 23    Primary care physician: Jodie Franz MD  LVAD cardiologist: Mary Ann Moore MD    CHIEF COMPLAINT:  Follow up for heart failure, LVAD    PLAN OF CARE:  69 y.o. with chronic systolic heart failure due to ischemic cardiomyopathy, s/p HM3 LVAD implant as DT on 22 by Dr. Gómez Ricks complicated by intraoperative bleeding requiring multiple blood products and subsequent RV failure with severe TR requiring prolonged dobutamine wean and long term CT support. Left thoracentesis 22 by IR; 1.4L removed and no further complications. PLAN:  Heart Failure/Cardiomyopathy:  Continue current medical therapy for heart failure:  Beta-blocker: Intolerant due to RV failure  Continue current dose of ACE/ARB/ARNi: Entresto 24-26 mg BID  Increase Hydralazine 20 mg TID  Continue current dose of MRA: Eplerenone 25 mg daily  Continue current dose of SGLT2 inhibitor: Jardiance 10 mg daily  Continue current dose of diuretic: Bumex 2 mg daily  Reinforced low salt diet  Reinforced fluid restriction to 6 x 8oz glasses per day  Not on allopurinol or uloric, uric acid normal    LVAD/Anticoagulation:  Continue current device speed at 4800 RPM. Will schedule for hemodynamic RAMP study. No signs of bleeding, infection or stroke. Continue dressing changes three times weekly  Chronic anticoagulation with coumadin, followed by LVAD RN  Continue ASA 81 mg daily    Routine LVAD/anticoagulation labs: Labs  reviewed. Blood counts normal, LDH normal, INR within target range.  Renal function and electrolytes normal, liver function normal.  6MW today    CAD:  Continue Aspirin    Arrhythmia/ICD:  ICD interrogation every 3 months per routine   Follow-up with EP cardiologist    Hypertension:  Doppler , titrated Hydralazine as above. SRUTHI:  Continue CPAP therapy    Follow-up with PCP  Referred to urology for elevated PSA  Referred to hematology for abnormal gammopathy  Recommend flu, covid and pneumonia vaccinations    Return to [facility] in 2 months     HISTORY OF PRESENT ILLNESS:  I had the pleasure of seeing Gabriel Arrington in [facility] at [facility] in [place]. Briefly, Gabriel Arrington is a 69 y.o. male with a history of HTN, HLD, DM2, SRUTHI on CPAP, chronic systolic heart failure, stage D due to ischemic cardiomyopathy, cardiac arrest s/p ICD 1/2013 ([address]), coronary artery disease s/p multiple interventions and 4V CABG (8/2012). LHC (7/2019) showed severe stenosis of LIMA to LAD anastomosis site, SVG graft to OM occluded, SVG graft to RCA 40-50%, LAD occluded proximally, severe proximal LCx, RCA is the long . PET (6/2019) EF 24% with anterior lateral and inferior reversible defect. He additionally has a history of anemia, microcytic with iron deficiency, DVT with filter. Patient was [place] [facility] by Dr. Joseph Bui in 2021 for evaluation of his candidacy for advanced therapies. Patient agreed to inpatient admission for initiation of inotropes and evaluation for DT-LVAD. Patient was admitted 5/2-5/25/21. He was started on milrinone infusion and had first part of LVAD evaluation completed. Right and left heart cath on 5/24/21 showed severe diffuse native vessel CAD, with patent grafts (LIMA to D2, SVG to RCA). Pt was found to have pulmonary nodule during workup. He was found to have adenocarcinoma of the lung, and so LVAD was deferred pending treatment. Patient completed radiation treatment for adenocarcinoma of the lung. Hem-onc cleared patient for VAD implant with 90% 2 year prognosis. He presented to [facility] on 4/3/22 for admission for planned LVAD implant which he underwent on 4/5/22.  This was complicated by bleeding intra-op, requiring cryo, k-centra, FFP, Plt, and cellsaver in OR. Had RV dysfunction noted intra-op; requiring lower VAD speed and dual inotrope support. 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 He also had issues with significant chest tube drainage requiring prolonged placement. He was eventually discharged home on POD 29 with GDMT (no BB due to RV failure) with [facility] SN/PT and OT. He had persistent left pleural effusion and underwent thoracentesis in IR on 6/9/22 with 1.4L removed. INTERVAL HISTORY (04/20/23 ):  Mckay Romano  has not had any ER visits or hospitalizations since the last visit. He remains stable. He denies chest pain or dyspnea on exertion. He is at times fatigued with exertion. He completed cardiac rehab but is no longer getting regular exercise. He denies leg swelling or abdominal fullness, orthopnea and PND. He sleeps in a recliner for comfort. He denies palpitations, lightheadedness or syncope. Denies melena, hematochezia, hematemesis, and epistaxis. Denies drainage, redness or pain from the driveline. No stroke like symptoms. He is following a low sodium diet and restricting fluid intake. Weight has been stable. He is compliant with medications. PROBLEM LIST:  S/p LVAD implant as DT (4/22)  Post-op RV failure requiring prolonged use of dobutamine  Chronic systolic heart failure  Stage D, NYHA class IV symptoms  Non-ischemic cardiomyopathy, LVEF 20% with LVEDD 6.2  RV dysfunction  At risk of sudden cardiac death  Previous cardiac arrest   S/p ICD (1/2013, [employer] followed by [employer]); New implant on 10/21/2021 [employer] Patient states ICD was deactivated due to needing to upgrade his wires and he didn't want another procedure at the time.    Coronary artery disease  S/p multiple interventions  S/p 4V CABG (8/2012)  LHC (7/2019) severe stenosis of LIMA to LAD anastomosis site, SVG graft to OM is occluded, SVG graft to RCA 40-50%, LAD occluded proximally, severe proximal LCx, RCA is the long . PET (6/2019) EF 24% with anterior lateral and inferior reversible defect  Cardiac risk factors  HTN  HL  DM2  SRUTHI on CPAP  Mild carotid stenosis  Anemia, microcytic  Iron deficiency  DVT with filter  Pulmonary nodules/history of lung cancer s/p radiation 9/21   Dysphagia     CARDIAC IMAGING and DIAGNOSTICS REVIEWED:  Echo (12/6/22) EF 20-25%; RV dilated with reduced systolic function; mod TR     Echo (6/28/22): LVEF 25 - 30%. Mildly increased wall thickness. Severe global hypokinesis present     Echo (6/2/22) LVEF 20-25%, mild MR, mod to severe TR, LVIDd 4.9cm, RVIDd 6cm, TAPSE 0.9cm      Echo (5/4/22) LVEF 15-20%, mod TR, LVIDd 5.3 cm, TAPSE 0.9 cm      Echo (4/26/22) LVIDd 4.9 cm, TAPSE 0.5 cm,      Echo (4/18/22) LVEF 15-20%, severe TR, TAPSE 0.5cm, RVIDd 6cm      Echo (4/8/22) LVEF 10-15%. Severe global hypokinesis present. 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 Echo (3/2/22) LVEF 10-15%; Mod-severe MR      Echo (11/23/21) LVEF 15-20%; Mod-severe, MR, mod-TR     Echo (5/20/21) LVEF is 20 - 25%. Severely and globally reduced systolic function. Severe (grade 4) left ventricular diastolic dysfunction. [facility] (5/24/21) Native Coronaries: LM - moderate to severe distal disease 50%. % prox occluded (), heavily calcified, LCx: 80% proximal stenosis. OM1 is  (seen filling faintly via collaterals). OM2 99% stenosis. RCA: 100% proximal occluded. LIMA to LAD: patent, supplies only a diagonal branch (probably D2). The LAD distal to the bifurcation is 100% occluded () and fills via right to left collaterals off the SVG to RCA. SVG to R-PDA: patent with moderate diffuse irregularities but no obstructive disease in the graft. No appealing interventional targets.      EKG (2/14/23)  SB with 1st degree AVB     6 Min Walk Report 4/20/2023 9/27/2022 5/4/2022 5/2/2022 11/12/2021 7/6/2021 5/20/2021   (PRE) HR 75 70 82 82 88 98 74   (PRE) O2 Sat 97 96 - 98 100 - 99   (POST)  79 91 88 102 - 81   (POST) O2 Sat 99 100 - 98 99 98% on Ra 99   Distance in Meters 343.21 336.8 191.41 181.36 386. 62 - 1158.24       LIFE GOALS:  Lifestyle goals reviewed with the patient. REVIEW OF SYSTEMS:  General: Denies fever. Ear, nose and throat: Denies difficulty hearing, sinus problems, nosebleeds  Cardiovascular: see above in the interval history  Respiratory: Denies cough, wheezing, sputum production, hemoptysis. Gastrointestinal: Denies heartburn, constipation, diarrhea, abdominal pain, nausea, blood in stool  Kidney and bladder: Denies painful urination, frequent urination  Musculoskeletal: Denies joint pain, muscle weakness  Skin and hair: Denies change in existing skin lesions    PHYSICAL EXAM:  Visit Vitals  BP (!) 102/0 (BP 1 Location: Left upper arm, BP Patient Position: Sitting, BP Cuff Size: Adult) Comment: MAP   Pulse 75   Temp 97.8 °F (36.6 °C) (Oral)   Resp 18   Ht 6' 1\" (1.854 m)   Wt 214 lb (97.1 kg)   SpO2 99%   BMI 28.23 kg/m²     Constitutional:  Well Nourished and well developed  Eyes:  Conjunctiva: Normal  Bilateral Lids: Normal  Ears, Nose, Mouth and Throat:  Oral Mucosa: Moist  Cyanosis: Absent  Pallor: Absent  Neck:  Jugular Venous Distension: absent  Respiratory: Auscultation: Clear to auscultation; No Rales, Wheezes or Rhonchi  Effort: Non-labored respirations  Cardiovascular:  Murmur: No Murmur  Cardiac sounds: Normal S1 and S2  Rhythm: Regular  VAD tones: Normal  Edema: absent from lower extremities  Vascular:  absent pulses  Gastrointestinal:  Normoactive bowel sounds  Soft, Non-tender, Non distended  No splenomegaly, No hepatomegaly  Driveline inspected. There is no erythema, tenderness, or drainage from the driveline exit site. Dressing clean, dry and intact.   Musculoskeletal:  no deformity  Extremities:  No clubbing or cyanosis  Skin:  Inspection: No rash, no ulcers  Neurologic/Psychiatric:  Orientation: Oriented to time, place and person  Mood and Affect: Appropriate    LVAD   Pump Speed (RPM): 4800  Pump Flow (LPM): 3.6  MAP: 102  PI (Pulsitility Index): 7.6  Power: 3.2   Test: Yes  Back Up  at Bedside & Labeled: Yes  Driveline Site Care: No  Driveline Dressing: Clean, Dry, and Intact  Outpatient: Yes  MAP in Therapeutic Range (Outpatient): No  Testing  Alarms Reviewed: Yes  Back up SC speed: 4800  Back up Low Speed Limit: 4400  Emergency Equipment with Patient?: Yes  Emergency procedures reviewed?: Yes  Drive line site inspected?: Yes  Drive line intergrity inspected?: Yes  Drive line dressing changed?: No    I reviewed the LVAD parameters from today, and compared the results to the patient's prior recorded data. No programming changes were made. The LVAD is functioning within specified parameters. The patient performs LVAD self-test daily. LVAD interrogation shows frequent PI events. LVAD equipment check completed and is in good working order. Back-up equipment present. LVAD education done on emergency procedures and precautions and reviewed exit site care. Driveline dressing clean, dry and intact. Securement device in place. No drainage, erythema, or tenderness.       Have you had any alarms    no  Have you had any redness at site   no  Have you had any drainage at site/on dressing no  Have you had any pain at site   no  Have you had any swelling at site   no  Do you need any equipment?     no      PAST MEDICAL HISTORY:  Past Medical History:   Diagnosis Date    CAD (coronary artery disease) '90 '97, '13 x 2    [place] [facility]    Calculus of kidney     Cardiac arrest ([facility] [address] 2/4/2013    Colonic polyps     Congestive heart failure, unspecified     Diabetes ([facility] [address]     Gastritis     Hypercholesterolemia     Sleep apnea        PAST SURGICAL HISTORY:  Past Surgical History:   Procedure Laterality Date    COLONOSCOPY 04/06/2011    16, due 21    COLONOSCOPY N/A 3/2/2022    [facility]    [facility]      11, due 16    HX CORONARY ARTERY BYPASS GRAFT  8/22/12    x 4 vessel by MISSY CABRALES HEENT      LASIK    HX PACEMAKER PLACEMENT  1/30/13    [facility] UNLISTED PROCEDURE CARDIAC SURGERY  2012    x 4 vessels       FAMILY HISTORY:  Family History   Problem Relation Age of Onset    Heart Disease Mother         MI    Heart Disease Father         CAD & PVD    Lung Disease Father     Cancer Father         lung    Diabetes Maternal Grandmother     Heart Disease Other         CAD    Stroke Sister        SOCIAL HISTORY:  Social History     Socioeconomic History    Marital status:    Tobacco Use    Smoking status: Never    Smokeless tobacco: Never   Vaping Use    Vaping Use: Never used   Substance and Sexual Activity    Alcohol use: Yes     Alcohol/week: 0.0 standard drinks     Comment:  VERY RARE    Drug use: No    Sexual activity: Not Currently       LABORATORY RESULTS:  Lab Results   Component Value Date/Time    Sodium 136 04/11/2023 11:07 AM    Potassium 4.3 04/11/2023 11:07 AM    Chloride 98 04/11/2023 11:07 AM    CO2 25 04/11/2023 11:07 AM    Anion gap 9 06/27/2022 03:11 PM    Glucose 108 (H) 04/11/2023 11:07 AM    BUN 20 04/11/2023 11:07 AM    Creatinine 1.16 04/11/2023 11:07 AM    BUN/Creatinine ratio 17 04/11/2023 11:07 AM    GFR est AA >60 06/27/2022 03:11 PM    GFR est non-AA 59 (L) 06/27/2022 03:11 PM    Calcium 9.2 04/11/2023 11:07 AM    Bilirubin, total 0.4 04/11/2023 11:07 AM    Alk.  phosphatase 140 (H) 04/11/2023 11:07 AM    Protein, total 7.1 04/11/2023 11:07 AM    Albumin 4.2 04/11/2023 11:07 AM    Globulin 4.0 06/27/2022 03:11 PM    A-G Ratio 1.4 04/11/2023 11:07 AM    ALT (SGPT) 8 04/11/2023 11:07 AM    AST (SGOT) 35 04/11/2023 11:07 AM       Lab Results   Component Value Date/Time    WBC 3.4 04/11/2023 11:07 AM    HGB 14.1 04/11/2023 11:07 AM HCT 42.7 04/11/2023 11:07 AM    PLATELET 669 22/92/4456 11:07 AM    MCV 80 04/11/2023 11:07 AM       Lab Results   Component Value Date/Time    INR 2.1 (H) 04/11/2023 11:07 AM    INR 2.4 (H) 03/16/2023 12:27 PM    INR 2.0 (H) 02/14/2023 12:00 AM    INR, External 2.1 04/11/2023 12:00 AM    INR, External 2.4 04/06/2023 12:00 AM    INR, External 2.6 03/02/2023 12:00 AM    Prothrombin time 20.9 (H) 04/11/2023 11:07 AM    Prothrombin time 23.8 (H) 03/16/2023 12:27 PM    Prothrombin time 20.1 (H) 02/14/2023 12:00 AM         ALLERGY:  Allergies   Allergen Reactions    Oxycodone Anaphylaxis    Pcn [Penicillins] Hives        CURRENT MEDICATIONS:    Current Outpatient Medications:     hydrALAZINE (APRESOLINE) 10 mg tablet, Take 2 Tablets by mouth three (3) times daily. , Disp: 180 Tablet, Rfl: 5    bumetanide (BUMEX) 2 mg tablet, Take 0.5 Tablets by mouth daily. , Disp: , Rfl:     docusate sodium (COLACE) 100 mg capsule, Take 1 Capsule by mouth daily. , Disp: , Rfl:     eplerenone (INSPRA) 25 mg tablet, TAKE 1 TABLET BY MOUTH DAILY, Disp: 90 Tablet, Rfl: 1    famotidine (PEPCID) 20 mg tablet, TAKE 1 TABLET BY MOUTH EVERY 12 HOURS (Patient taking differently: as needed.), Disp: 60 Tablet, Rfl: 2    sacubitriL-valsartan (Entresto) 24-26 mg tablet, Take 1 Tablet by mouth two (2) times a day., Disp: 60 Tablet, Rfl: 11    magnesium oxide (MAG-OX) 400 mg tablet, TAKE 1 TABLET BY MOUTH DAILY, Disp: 30 Tablet, Rfl: 3    nscqizrcx-VO-xqxsfwgq-guaifen (Mucinex Fast-Max Cold-Flu) 10- mg/20 mL liqd, Take 20 mL by mouth every four (4) hours as needed for Cough. Take 20 mL by mouth every 4 hours as needed for cough, Disp: , Rfl:     tamsulosin (FLOMAX) 0.4 mg capsule, Take 1 Capsule by mouth daily. , Disp: 30 Capsule, Rfl: 2    warfarin (COUMADIN) 2.5 mg tablet, Take 2 Tablets by mouth daily. , Disp: 90 Tablet, Rfl: 2    metFORMIN (GLUCOPHAGE) 500 mg tablet, Take 1 Tablet by mouth two (2) times daily (with meals). , Disp: 180 Tablet, Rfl: 3    acetaminophen (TYLENOL) 500 mg tablet, Take 1 Tablet by mouth every six (6) hours as needed for Fever or Pain., Disp: , Rfl:     aspirin delayed-release 81 mg tablet, Take 1 Tablet by mouth daily. , Disp: , Rfl:     empagliflozin (JARDIANCE) 25 mg tablet, Take 0.5 Tablets by mouth daily. , Disp: 45 Tablet, Rfl: 3    potassium chloride SR (K-TAB) 20 mEq tablet, Take 2 Tablets by mouth two (2) times a day. (Patient taking differently: Take 10 mEq by mouth two (2) times a day. Four tablets twice daily), Disp: 360 Tablet, Rfl: 1    PATIENT CARE TEAM:  Patient Care Team:  Apolinar Mcpherson MD as PCP - General (Internal Medicine Physician)  Apolinar Mcpherson MD as PCP - [facility]  Giuseppe Hidalgo MD as Physician (Cardiovascular Disease Physician)  Josi Cortez MD as Physician (Gastroenterology)  Bryant Lorenzo MD as Physician (Orthopedic Surgery)  Brittni Loja MD as Physician (Ophthalmology)  Miles Kinsey MD (Cardiovascular Disease Physician)  Kayla Mohamud MD (Cardiovascular Disease Physician)     Thank you for your referral and allowing me to participate in this patient's care.     Slime Munoz MD, [facility]  Advanced Heart Failure and Pulmonary Hypertension    Advanced [address]  [address], [address]  Phone: [phone]  Fax: [phone]

## 2023-04-19 NOTE — TELEPHONE ENCOUNTER
Telephone Call RE:  Appointment reminder     Outcome:     [] Patient confirmed appointment   [] Patient rescheduled appointment for    [] Unable to reach  [x] Left message              [] Other:       Home number has call blocker that instructs caller to push # if an invited caller but does not work.

## 2023-04-19 NOTE — PATIENT INSTRUCTIONS
Medication changes:    INCREASE hydralazine to 20mg three times a day   Take a full tablet of Jardiance for a total of 10mg a day     Please take this to your pharmacy to notify them of the change in medications. Testing Ordered: An order for echocardiogram has been placed to be done late May. You will be receiving an automated call from Coordination of Care to schedule this test. If you are unavailable to receive the call or would like to contact coordination of care yourself you may contact [phone] to schedule. You will need to contact coordination of care yourself if you miss their calls as they will only make 3 attempts to reach you. Dr. Estelita Grider would like you to receive a right heart cath in the next few weeks, you will be contacted for scheduling this. 6 minute walk and EKG completed in clinic today. Intermacs survey completed in clinic today. Annual euipment check completed today. LVAD annual education review completed today. Other Recommendations: Follow up with Urology regarding elevated PSA     Follow up with Hematology regarding abnormal Gammopathy     Please follow up with your dentist for routine evaluation. You will need to take preventative antibiotics prior to all dental work. Please notify our office any time you have a dental visit scheduled. 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 Your urine should be clear and light yellow straw colored. Follow up in 2 months  with Rudolph Mccloud      For further patient and caregiver resources as well as access to online LVAD support groups visit [email or web address]       Please bring your daily sheets and medications to your next appointment. Please monitor your weights daily upon waking and after using the bathroom. Keep a written records of your weights and bring to your next appointment. If you have a weight gain of 3 or more pounds overnight OR 5 or more pounds in one week please contact our office. Thank you for allowing us the privilege of being a part of your healthcare team! Please do not hesitate to contact our office at [phone] with any questions or concerns. Virtual Heart Failure [address] invites you to learn more about heart failure and to share your questions, ideas, and experiences with others. Each month, the Heart Failure Support Group features a new educational topic and a guest speaker, followed by an interactive discussion. Our Heart Failure Nurse Navigator will moderate each session. You will be able to participate by phone, tablet or computer through [address] [place] [address]. This support group takes place on the 3rd Thursday of each month from 6:00-7:30PM. All individuals living with heart failure and their caregivers are welcome to join. If you are interested in participating, please contact us at [email or web address] and you will be sent the link to join the [employer]. LVAD Daily Maintenance    For healthy living with your LVAD, youll need to make sure that:    Your equipment is working properly  You have sufficient power sources at all times  Your driveline exit site is clean and dry  Youre following a healthy diet  Youre exercising regularly, as youre able to tolerate it  [address] taking your medications and supplements as directed by your doctor. Your LVAD team will give you detailed instructions on what you need to do on a daily basis for your specific device and medical condition. They will also most likely give you a chart (sometimes called a flowsheet) customized for your specific device, to fill out daily.     Flowsheets make it easy to keep track of your weight, medications, device settings (such as pump speed, power, etc.) and other daily maintenance items.     General Daily Checklist  Check your Driveline and Driveline Exit Site  Make sure your driveline is not twisted or kinked, and is secured close to your body. Check the driveline regularly for signs of damage (cuts, holes, tears). If you see any damage, let your LVAD team know immediately. Do not try to repair it yourself. Change your exit site dressing as instructed by your LVAD Team. When examining the exit site, make sure its clean and dry. Notify your LVAD Team immediately if you see any signs of injury, trauma or infection. Signs of infection may include redness, drainage, tenderness/pain, fevers and/or shaking chills. Read more about driveline management. Monitor your Device Settings, Alarms and Power sources. Follow the device self-check instructions your LVAD team has given you or as explained in your device patient handbook. Note device settings and numbers on your flowsheet if you have one. Document any unusual alarms and the circumstances around them (such as what your activity was at the time of the alarm, any symptoms you had at the time of the alarm, etc). Remember to bring this information with you to your clinic visits to share with your LVAD team.  If your controller or other components are dirty, clean them carefully with a damp cloth. Be careful not to splash liquids on your equipment and do not submerge them in water. Most equipment display screens (such as on bedside monitors or controllers, depending upon your device) should only be cleaned with a dry, soft cloth. Consult your device manual or handbook for additional details. Take your Medications as Directed  Its very important to take your medications and supplements according to your doctors instructions, at the same times each day.  Visit the medications section of this site for information on commonly prescribed medications for LVAD patients, plus a chart you can print out and use to help keep track of your medications. Check your Weight    You measure your weight to help detect if you are retaining fluid. Try to weigh yourself around the same time every day, as your weight can fluctuate throughout the day. The ideal time is when you first wake up and get out of bed. If possible, zero your scale before stepping on it. Notify your LVAD team with any concerns you have about weight gain or loss. Be Aware of What and How Much you are Eating    While you were in the hospital, you may have needed to take dietary supplements because of poor nutrition related to your disease. If your clinicians direct you to keep taking them after you leave the hospital, be sure to continue them as instructed. Some of the fluid and dietary restrictions that you had before your LVAD implant may change once you leave the hospital. Work with your LVAD team to make sure you understand any new diet or fluid instructions. Contact your LVAD team if you have any significant changes in your ability to tolerate food or beverages (e.g., stomach or gastrointestinal flu, nausea, vomiting, or diarrhea). Its important not to become dehydrated and under-nourished, as this could cause significant problems with your pump or your overall health. From Website: www.Astechlvad. com [email or web address]/patients-[email or web address]-[email or web address]-lvad/lvad-daily-maintenance    Heart-Healthy Diet: Care Instructions  Your Care Instructions     A heart-healthy diet has lots of vegetables, fruits, nuts, beans, and whole grains, and is low in salt. It limits foods that are high in saturated fat, such as meats, cheeses, and fried foods. It may be hard to change your diet, but even small changes can lower your risk of heart attack and heart disease. Follow-up care is a key part of your treatment and safety. Be sure to make and go to all appointments, and call your doctor if you are having problems.  It's also a good idea to know your test results and keep a list of the medicines you take. How can you care for yourself at home? Watch your portions  Use food labels to learn what the recommended servings are for the foods you eat. Eat only the number of calories you need to stay at a healthy weight. If you need to lose weight, eat fewer calories than your body burns (through exercise and other physical activity). Eat more fruits and vegetables  Eat a variety of fruit and vegetables every day. Dark green, deep orange, red, or yellow fruits and vegetables are especially good for you. Examples include spinach, carrots, peaches, and berries. Keep carrots, celery, and other veggies handy for snacks. Buy fruit that is in season and store it where you can see it so that you will be tempted to eat it. Cook dishes that have a lot of veggies in them, such as stir-fries and soups. Limit saturated fat  Read food labels, and try to avoid saturated fats. They increase your risk of heart disease. Use olive or canola oil when you cook. Bake, broil, grill, or steam foods instead of frying them. Choose lean meats instead of high-fat meats such as hot dogs and sausages. Cut off all visible fat when you prepare meat. Eat fish, skinless poultry, and meat alternatives such as soy products instead of high-fat meats. Soy products, such as tofu, may be especially good for your heart. Choose low-fat or fat-free milk and dairy products. Eat foods high in fiber  Eat a variety of grain products every day. Include whole-grain foods that have lots of fiber and nutrients. Examples of whole-grain foods include oats, whole wheat bread, and brown rice. Buy whole-grain breads and cereals, instead of white bread or pastries. Limit salt and sodium  Limit how much salt and sodium you eat to help lower your blood pressure. Taste food before you salt it. Add only a little salt when you think you need it. With time, your taste buds will adjust to less salt.   Eat fewer snack items, fast foods, and other high-salt, processed foods. Check food labels for the amount of sodium in packaged foods. Choose low-sodium versions of canned goods (such as soups, vegetables, and beans). Limit sugar  Limit drinks and foods with added sugar. These include candy, desserts, and soda pop. Limit alcohol  Limit alcohol to no more than 2 drinks a day for men and 1 drink a day for women. Too much alcohol can cause health problems. When should you call for help? Watch closely for changes in your health, and be sure to contact your doctor if:    You would like help planning heart-healthy meals. Where can you learn more? Go to http://www.graham.com/  Enter V137 in the search box to learn more about \"Heart-Healthy Diet: Care Instructions. \"  Current as of: December 17, 2020               Content Version: 13.0  © 7251-8014 [employer]. Care instructions adapted under license by [employer] (which disclaims liability or warranty for this information). If you have questions about a medical condition or this instruction, always ask your healthcare professional. [place] [address] any warranty or liability for your use of this information. Low Sodium Diet (2,000 Milligram): Care Instructions  Overview     Limiting sodium can be an important part of managing some health problems. The most common source of sodium is salt. People get most of the salt in their diet from canned, prepared, and packaged foods. Fast food and restaurant meals also are very high in sodium. Your doctor will probably limit your sodium to less than 2,000 milligrams (mg) a day. This limit counts all the sodium in prepared and packaged foods and any salt you add to your food. Follow-up care is a key part of your treatment and safety. Be sure to make and go to all appointments, and call your doctor if you are having problems.  It's also a good idea to know your test results and keep a list of the medicines you take. How can you care for yourself at home? Read food labels  Read labels on cans and food packages. The labels tell you how much sodium is in each serving. Make sure that you look at the serving size. If you eat more than the serving size, you have eaten more sodium. Food labels also tell you the Percent Daily Value for sodium. Choose products with low Percent Daily Values for sodium. Be aware that sodium can come in forms other than salt, including monosodium glutamate (MSG), sodium citrate, and sodium bicarbonate (baking soda). MSG is often added to Asian food. When you eat out, you can sometimes ask for food without MSG or added salt. Buy low-sodium foods  Buy foods that are labeled \"unsalted\" (no salt added), \"sodium-free\" (less than 5 mg of sodium per serving), or \"low-sodium\" (140 mg or less of sodium per serving). Foods labeled \"reduced-sodium\" and \"light sodium\" may still have too much sodium. Be sure to read the label to see how much sodium you are getting. Buy fresh vegetables, or frozen vegetables without added sauces. Buy low-sodium versions of canned vegetables, soups, and other canned goods. Prepare low-sodium meals  Cut back on the amount of salt you use in cooking. This will help you adjust to the taste. Do not add salt after cooking. One teaspoon of salt has about 2,300 mg of sodium. Take the salt shaker off the table. Flavor your food with garlic, lemon juice, onion, vinegar, herbs, and spices. Do not use soy sauce, lite soy sauce, steak sauce, onion salt, garlic salt, celery salt, or ketchup on your food. Use low-sodium salad dressings, sauces, and ketchup. Or make your own salad dressings and sauces without adding salt. Use less salt (or none) when recipes call for it. You can often use half the salt a recipe calls for without losing flavor. Other foods such as rice, pasta, and grains do not need added salt. Rinse canned vegetables, and cook them in fresh water.  This removes some--but not all--of the salt. Avoid water that is naturally high in sodium or that has been treated with water softeners, which add sodium. If you buy bottled water, read the label and choose a sodium-free brand. Avoid high-sodium foods  Avoid eating:  Smoked, cured, salted, and canned meat, fish, and poultry. Ham, bailey, hot dogs, and luncheon meats. Regular, hard, and processed cheese and regular peanut butter. Crackers with salted tops, and other salted snack foods such as pretzels, chips, and salted popcorn. Frozen prepared meals, unless labeled low-sodium. Canned and dried soups, broths, and bouillon, unless labeled sodium-free or low-sodium. Canned vegetables, unless labeled sodium-free or low-sodium. [place] fries, pizza, tacos, and other fast foods. Pickles, olives, ketchup, and other condiments, especially soy sauce, unless labeled sodium-free or low-sodium. Where can you learn more? Go to http://www.gray.com/  Enter V843 in the search box to learn more about \"Low Sodium Diet (2,000 Milligram): Care Instructions. \"  Current as of: December 17, 2020               Content Version: 13.0  © 6724-9815 [employer]. Care instructions adapted under license by [employer] (which disclaims liability or warranty for this information). If you have questions about a medical condition or this instruction, always ask your healthcare professional. [place] [address] any warranty or liability for your use of this information.

## 2023-04-20 ENCOUNTER — OFFICE VISIT (OUTPATIENT)
Dept: CARDIOLOGY CLINIC | Age: 78
End: 2023-04-20

## 2023-04-20 VITALS
TEMPERATURE: 97.8 F | RESPIRATION RATE: 18 BRPM | OXYGEN SATURATION: 99 % | HEART RATE: 75 BPM | SYSTOLIC BLOOD PRESSURE: 102 MMHG | HEIGHT: 73 IN | WEIGHT: 214 LBS | BODY MASS INDEX: 28.36 KG/M2

## 2023-04-20 DIAGNOSIS — Z95.811 LVAD (LEFT VENTRICULAR ASSIST DEVICE) PRESENT (HCC): ICD-10-CM

## 2023-04-20 DIAGNOSIS — Z95.811 S/P VENTRICULAR ASSIST DEVICE (HCC): ICD-10-CM

## 2023-04-20 DIAGNOSIS — E11.51 TYPE 2 DIABETES, CONTROLLED, WITH PERIPHERAL CIRCULATORY DISORDER (HCC): ICD-10-CM

## 2023-04-20 DIAGNOSIS — I25.5 ISCHEMIC CARDIOMYOPATHY: ICD-10-CM

## 2023-04-20 DIAGNOSIS — Z79.01 CHRONIC ANTICOAGULATION: ICD-10-CM

## 2023-04-20 DIAGNOSIS — I50.20 HFREF (HEART FAILURE WITH REDUCED EJECTION FRACTION) (HCC): Primary | ICD-10-CM

## 2023-04-20 RX ORDER — FAMOTIDINE 20 MG/1
20 TABLET, FILM COATED ORAL AS NEEDED
Qty: 90 TABLET | Refills: 3
Start: 2023-04-20

## 2023-04-20 RX ORDER — HYDRALAZINE HYDROCHLORIDE 10 MG/1
20 TABLET, FILM COATED ORAL 3 TIMES DAILY
Qty: 180 TABLET | Refills: 5 | Status: SHIPPED | OUTPATIENT
Start: 2023-04-20

## 2023-04-20 RX ORDER — POTASSIUM CHLORIDE 750 MG/1
40 TABLET, FILM COATED, EXTENDED RELEASE ORAL 2 TIMES DAILY
Qty: 240 TABLET | Refills: 5
Start: 2023-04-20

## 2023-04-20 NOTE — PROGRESS NOTES
[facility]  LVAD Annual Visit      Patient denied s/s of driveline infection or driveline trauma (including  drops). No alarms noted on interrogation or . Driveline inspected for integrity. Above reported to provider. LVAD interrogation completed in clinic, results reported to provider. See flow sheet for details. Completed annual periodic maintenance on patients own LVAD Mobile Power Unit and Universal Battery Charger. Mobile Power Unit AA batteries were replaced per  guidelines. Please see further documentation in equipment log. Six minute walk and annual Intermacs survey completed in clinic. Annual  exchange competency completed by patient's caregiver  with verbal prompting. Driveline dressing change correctly taught back. The following education was provided to patient at visit today:  -Mobile power unit maintenance: Changing AA batteries twice yearly, monitoring cords for damage  -Back up : Charging back up battery twice yearly  -14v lithium ion battery maintenance: Battery calibration. Hand out provided  -Daily LVAD maintenance: Performing daily  self test, checking driveline and powerleads for signs of damage (kinks, bends, cuts), recording daily LVAD parameters (Speed, Flow, PI, Power), and monitoring daily weights.  -Low sodium and heart healthy diet (See education in AVS)  -Discussed need for antibiotics prior to any dental work   -Follow up with hematology and urology- per pt he saw a urologist at [facility] many years ago and would like to see him again. Will send results of psa     Time given to ask questions. Patient had no further questions at this time. All orders entered per VORB. All provider instructions placed in AVS and reviewed with patient.

## 2023-04-20 NOTE — PROGRESS NOTES
Met with pt. And wife for annual visit. Pt. Has no changes to insurance, demographics and advanced directives. Pt. Is full code status. Discussed life with LVAD. Pt. Reports feeling well. He and his wife have an outing to go to this evening for pt. Wife's work. Pt. Reports his annual visit went well. No concerns or issues.

## 2023-04-24 ENCOUNTER — TELEPHONE (OUTPATIENT)
Dept: CARDIOLOGY CLINIC | Age: 78
End: 2023-04-24

## 2023-04-24 NOTE — TELEPHONE ENCOUNTER
Contacted patient via phone for INR reminder. Telephone Call RE:  INR Reminder      Outcome:     [x] Patient verbalizes understanding    [] Unable to reach   [] Left message              [x]     Spoke with patient in reference to INR reminder for tomorrow. Patient agrees to conduct INR and self report.     Caitlin Allen , [address] [place] [address]

## 2023-04-25 ENCOUNTER — TELEPHONE ANTICOAG (OUTPATIENT)
Dept: CARDIOLOGY CLINIC | Age: 78
End: 2023-04-25

## 2023-04-25 LAB — INR, EXTERNAL: 2.3

## 2023-04-25 NOTE — PROGRESS NOTES
Confirmed next upcoming date for RHC available is may 17th. Patient's wife states that they are open for RHC on that date. Advised that our clinic will reach out with further directions once the patient is on schedule. Patient's wife verbalized understanding.

## 2023-04-28 ENCOUNTER — OFFICE VISIT (OUTPATIENT)
Dept: INTERNAL MEDICINE CLINIC | Age: 78
End: 2023-04-28

## 2023-04-28 VITALS
DIASTOLIC BLOOD PRESSURE: 53 MMHG | HEIGHT: 73 IN | TEMPERATURE: 97.6 F | SYSTOLIC BLOOD PRESSURE: 71 MMHG | HEART RATE: 76 BPM | RESPIRATION RATE: 20 BRPM | WEIGHT: 219 LBS | OXYGEN SATURATION: 98 % | BODY MASS INDEX: 29.03 KG/M2

## 2023-04-28 DIAGNOSIS — Z95.811 S/P VENTRICULAR ASSIST DEVICE (HCC): ICD-10-CM

## 2023-04-28 DIAGNOSIS — R97.20 ELEVATED PSA: ICD-10-CM

## 2023-04-28 DIAGNOSIS — E11.51 TYPE 2 DIABETES, CONTROLLED, WITH PERIPHERAL CIRCULATORY DISORDER (HCC): Primary | ICD-10-CM

## 2023-04-28 DIAGNOSIS — D47.2 MGUS (MONOCLONAL GAMMOPATHY OF UNKNOWN SIGNIFICANCE): ICD-10-CM

## 2023-04-28 DIAGNOSIS — I50.20 HFREF (HEART FAILURE WITH REDUCED EJECTION FRACTION) (HCC): ICD-10-CM

## 2023-04-28 DIAGNOSIS — Z85.118 HISTORY OF LUNG CANCER: ICD-10-CM

## 2023-04-28 NOTE — PATIENT INSTRUCTIONS
Please obtain the following vaccines from your local pharmacy. Please ask your pharmacy to fax our office a copy of the vaccination record. Our fax number is [phone].    - Bivalent COVID booster  - shingles - 2 doses - shingrix

## 2023-04-28 NOTE — ASSESSMENT & PLAN NOTE
PSA checked by heart failure team. It has doubled in the last 8 months. He will see urology at [facility].

## 2023-04-28 NOTE — PROGRESS NOTES
Caryle Blanco is a 69y.o. year old male who presents today (04/28/23) for follow-up. Assessment & Plan:   1. Type 2 diabetes, controlled, with peripheral circulatory disorder [facility])  Assessment & Plan:  Controlled, recent A1c 6.7%. Cont metformin and Jardiance. 2. Elevated PSA  Assessment & Plan:  PSA checked by heart failure team. It has doubled in the last 8 months. He will see urology at [facility]. 3. HFrEF (heart failure with reduced ejection fraction) ([facility]  Assessment & Plan:  Continue to follow with HF teams for management   4. S/P ventricular assist device ([facility]  5. History of lung cancer  Assessment & Plan:  Continue to follow with Dr Earlene Diaz for surveillance   6. MGUS (monoclonal gammopathy of unknown significance)  Assessment & Plan:  Continue to follow with Dr Earlene Diaz for monitoring        Health Maintenance   Flu vaccine: 10/31/22  COVID vaccine: discussed bivalent booster - recommended he receive booster  Tetanus vaccine: 10/2019  Shingles vaccine: due, discussed   Pneumonia vaccine: up to date  Colon cancer screening: aged out  PSA: aged out  Hep C: 2021  Lipid: 6/2022  DM: 6/2022  Healthcare decision maker: wife  ACP:    RTC: 6 mo medicare annual wellness    Subjective:   Shantell Guevara was seen today for Diabetes    Here with wife. He feels good today, no new issues. DM  - metformin, jardiance. Recently his jardiance has been increased to 25mg daily.  - A1c 6.7% 4/2023    Elevated PSA  - urology referral - will see Dr Lopez Brady at [facility]    HF on LVAD  - advanced HF team at [address] [place] [address]    MGUS and hx [place]  - Dr Sweta Rivera at [facility] - has visit July 7    Review of Systems   All other systems reviewed and are negative.     PMHx    Patient Active Problem List   Diagnosis Code    Calculus of kidney N20.0    Coronary atherosclerosis of native coronary artery I25.10    Benign neoplasm of colon D12.6    Sleep apnea G47.30    Reflux esophagitis K21.00    Chronic anticoagulation Z79.01    Insomnia, unspecified G47.00    BPH without obstruction/lower urinary tract symptoms N40.0    Proteinuria R80.9    Mixed hyperlipidemia E78.2    Type 2 diabetes, controlled, with peripheral circulatory disorder ([facility]) E11.51    HFrEF (heart failure with reduced ejection fraction) ([facility]) I50.20    S/P ventricular assist device ([facility] Z95.811    History of lung cancer Z85.118    MGUS (monoclonal gammopathy of unknown significance) D47.2    Ischemic cardiomyopathy I25.5    Elevated PSA R97.20       Prior to Admission medications    Medication Sig Start Date End Date Taking? Authorizing Provider   hydrALAZINE (APRESOLINE) 10 mg tablet Take 2 Tablets by mouth three (3) times daily. 4/20/23  Yes Raine Ayala MD   famotidine (PEPCID) 20 mg tablet Take 1 Tablet by mouth as needed for Gastroesophageal Reflux Disease (GERD) or Heartburn. 4/20/23  Yes Raine Ayala MD   potassium chloride SR (KLOR-CON 10) 10 mEq tablet Take 4 Tablets by mouth two (2) times a day. 4/20/23  Yes Raine Ayala MD   [facility]) 2 mg tablet Take 0.5 Tablets by mouth daily. Yes Provider, Historical   docusate sodium (COLACE) 100 mg capsule Take 1 Capsule by mouth daily. Yes Provider, Historical   eplerenone (INSPRA) 25 mg tablet TAKE 1 TABLET BY MOUTH DAILY 10/28/22  Yes Kyle JACOBSON NP   empagliflozin (JARDIANCE) 25 mg tablet Take 0.5 Tablets by mouth daily. Patient taking differently: Take 1 Tablet by mouth daily. 9/23/22  Yes Inga Stewart NP   sacubitriL-valsartan Walterine Honey) 24-26 mg tablet Take 1 Tablet by mouth two (2) times a day. 8/12/22  Yes Danny Whelan NP   magnesium oxide (MAG-OX) 400 mg tablet TAKE 1 TABLET BY MOUTH DAILY 8/2/22  Yes Inga Stewart NP   vwmbqczrp-ML-ulloyips-guaifen (Mucinex Fast-Max Cold-Flu) 10- mg/20 mL liqd Take 20 mL by mouth every four (4) hours as needed for Cough.  Take 20 mL by mouth every 4 hours as needed for cough   Yes Provider, Historical   tamsulosin (FLOMAX) 0.4 mg capsule Take 1 Capsule by mouth daily. 5/10/22  Yes Tami Quiroz NP   warfarin (COUMADIN) 2.5 mg tablet Take 2 Tablets by mouth daily. Patient taking differently: Take 2 Tablets by mouth daily. Take 2 tabs daily and 3 tabs on Friday. 5/6/22  Yes Tami Quiroz NP   metFORMIN (GLUCOPHAGE) 500 mg tablet Take 1 Tablet by mouth two (2) times daily (with meals). 1/11/22  Yes Randy Gustafson MD   acetaminophen (TYLENOL) 500 mg tablet Take 1 Tablet by mouth every six (6) hours as needed for Fever or Pain. Yes Provider, Historical   aspirin delayed-release 81 mg tablet Take 1 Tablet by mouth daily. Yes Provider, Historical       The following sections were reviewed & updated as appropriate: Problem List, Allergies, PMH, PSH, FH, and SH. Objective:   Visit Vitals  BP (!) 71/53 Comment: pt has heart pump. Pulse 76   Temp 97.6 °F (36.4 °C) (Temporal)   Resp 20   Ht 6' 1\" (1.854 m)   Wt 219 lb (99.3 kg)   SpO2 98%   BMI 28.89 kg/m²       Physical Exam  Constitutional:       General: He is not in acute distress. Eyes:      Extraocular Movements: Extraocular movements intact. Conjunctiva/sclera: Conjunctivae normal.   Cardiovascular:      Comments: Continuous wooshing - LVAD  Pulmonary:      Effort: Pulmonary effort is normal. No respiratory distress. Abdominal:      General: Bowel sounds are normal.      Palpations: Abdomen is soft. Musculoskeletal:      Right lower leg: No edema. Left lower leg: No edema. Neurological:      General: No focal deficit present. Mental Status: He is alert.    Psychiatric:         Mood and Affect: Mood normal.         Behavior: Behavior normal.            Mona Cannon MD

## 2023-05-03 ENCOUNTER — TELEPHONE (OUTPATIENT)
Dept: CARDIOLOGY CLINIC | Age: 78
End: 2023-05-03

## 2023-05-03 DIAGNOSIS — R97.20 ELEVATED PSA: Primary | ICD-10-CM

## 2023-05-03 RX ORDER — CLINDAMYCIN HYDROCHLORIDE 300 MG/1
600 CAPSULE ORAL ONCE
Qty: 2 CAPSULE | Refills: 0 | Status: SHIPPED | OUTPATIENT
Start: 2023-05-03 | End: 2023-05-05

## 2023-05-05 ENCOUNTER — TELEPHONE (OUTPATIENT)
Dept: CARDIOLOGY CLINIC | Age: 78
End: 2023-05-05

## 2023-05-05 RX ORDER — CLINDAMYCIN HYDROCHLORIDE 300 MG/1
600 CAPSULE ORAL ONCE
Qty: 2 CAPSULE | Refills: 0 | Status: SHIPPED | OUTPATIENT
Start: 2023-05-05 | End: 2023-05-05

## 2023-05-09 DIAGNOSIS — I50.9 CONGESTIVE HEART FAILURE, UNSPECIFIED HF CHRONICITY, UNSPECIFIED HEART FAILURE TYPE (HCC): ICD-10-CM

## 2023-05-09 DIAGNOSIS — Z51.81 ENCOUNTER FOR MONITORING DIURETIC THERAPY: ICD-10-CM

## 2023-05-09 DIAGNOSIS — E55.9 VITAMIN D DEFICIENCY, UNSPECIFIED: ICD-10-CM

## 2023-05-09 DIAGNOSIS — Z79.01 CHRONIC ANTICOAGULATION: ICD-10-CM

## 2023-05-09 DIAGNOSIS — I50.9 CONGESTIVE HEART FAILURE, UNSPECIFIED HF CHRONICITY, UNSPECIFIED HEART FAILURE TYPE (HCC): Primary | ICD-10-CM

## 2023-05-09 DIAGNOSIS — Z79.899 ENCOUNTER FOR MONITORING DIURETIC THERAPY: ICD-10-CM

## 2023-05-09 DIAGNOSIS — R06.02 SHORTNESS OF BREATH: ICD-10-CM

## 2023-05-09 LAB
BASOPHILS # BLD AUTO: 0 X10E3/UL (ref 0–0.2)
BASOPHILS NFR BLD AUTO: 1 %
EOSINOPHIL # BLD AUTO: 0 X10E3/UL (ref 0–0.4)
EOSINOPHIL NFR BLD AUTO: 1 %
ERYTHROCYTE [DISTWIDTH] IN BLOOD BY AUTOMATED COUNT: 14.9 % (ref 11.6–15.4)
HCT VFR BLD AUTO: 43.2 % (ref 37.5–51)
HGB BLD-MCNC: 14.1 G/DL (ref 13–17.7)
IMM GRANULOCYTES # BLD AUTO: 0 X10E3/UL (ref 0–0.1)
IMM GRANULOCYTES NFR BLD AUTO: 0 %
INR BLD: 1.7
INR BLD: 1.7
LYMPHOCYTES # BLD AUTO: 0.7 X10E3/UL (ref 0.7–3.1)
LYMPHOCYTES NFR BLD AUTO: 21 %
MCH RBC QN AUTO: 26.3 PG (ref 26.6–33)
MCHC RBC AUTO-ENTMCNC: 32.6 G/DL (ref 31.5–35.7)
MCV RBC AUTO: 80 FL (ref 79–97)
MONOCYTES # BLD AUTO: 0.4 X10E3/UL (ref 0.1–0.9)
MONOCYTES NFR BLD AUTO: 11 %
NEUTROPHILS # BLD AUTO: 2 X10E3/UL (ref 1.4–7)
NEUTROPHILS NFR BLD AUTO: 66 %
PLATELET # BLD AUTO: 172 X10E3/UL (ref 150–450)
RBC # BLD AUTO: 5.37 X10E6/UL (ref 4.14–5.8)
WBC # BLD AUTO: 3.1 X10E3/UL (ref 3.4–10.8)

## 2023-05-10 ENCOUNTER — ANTI-COAG VISIT (OUTPATIENT)
Age: 78
End: 2023-05-10

## 2023-05-10 LAB
ALBUMIN SERPL-MCNC: 4.2 G/DL (ref 3.7–4.7)
ALBUMIN/GLOB SERPL: 1.6 {RATIO} (ref 1.2–2.2)
ALP SERPL-CCNC: 123 IU/L (ref 44–121)
ALT SERPL-CCNC: 9 IU/L (ref 0–44)
AST SERPL-CCNC: 36 IU/L (ref 0–40)
BILIRUB SERPL-MCNC: 0.4 MG/DL (ref 0–1.2)
BNP SERPL-MCNC: 165.2 PG/ML (ref 0–100)
BUN SERPL-MCNC: 18 MG/DL (ref 8–27)
BUN/CREAT SERPL: 15 (ref 10–24)
CALCIUM SERPL-MCNC: 9.5 MG/DL (ref 8.6–10.2)
CHLORIDE SERPL-SCNC: 102 MMOL/L (ref 96–106)
CO2 SERPL-SCNC: 24 MMOL/L (ref 20–29)
CREAT SERPL-MCNC: 1.18 MG/DL (ref 0.76–1.27)
EGFRCR SERPLBLD CKD-EPI 2021: 64 ML/MIN/1.73
GLOBULIN SER CALC-MCNC: 2.6 G/DL (ref 1.5–4.5)
GLUCOSE SERPL-MCNC: 101 MG/DL (ref 70–99)
INR PPP: 1.7 (ref 0.9–1.2)
LDH SERPL L TO P-CCNC: 161 IU/L (ref 121–224)
MAGNESIUM SERPL-MCNC: 2.4 MG/DL (ref 1.6–2.3)
POTASSIUM SERPL-SCNC: 4.8 MMOL/L (ref 3.5–5.2)
PROT SERPL-MCNC: 6.8 G/DL (ref 6–8.5)
PROTHROMBIN TIME: 17.2 SEC (ref 9.1–12)
SODIUM SERPL-SCNC: 140 MMOL/L (ref 134–144)

## 2023-05-15 ENCOUNTER — DIRECT ADMIT ORDERS (OUTPATIENT)
Age: 78
End: 2023-05-15

## 2023-05-15 ENCOUNTER — TELEPHONE (OUTPATIENT)
Age: 78
End: 2023-05-15

## 2023-05-15 RX ORDER — SODIUM CHLORIDE 9 MG/ML
INJECTION, SOLUTION INTRAVENOUS PRN
Status: CANCELLED | OUTPATIENT
Start: 2023-05-15 | End: 2023-05-15

## 2023-05-16 ENCOUNTER — TELEPHONE (OUTPATIENT)
Age: 78
End: 2023-05-16

## 2023-05-16 NOTE — TELEPHONE ENCOUNTER
Received call from patient's wife to review RHC instructions for 05/17. Reviewed [facility] RHC instructions including holding anti-glycemic agents, NPO after midnight and arrival time (5436 confirmed by cath lab). She verbalized understanding.

## 2023-05-16 NOTE — TELEPHONE ENCOUNTER
Contacted patient via phone for INR reminder. Telephone Call RE:  INR Reminder      Outcome:     [x] Patient verbalizes understanding    [] Unable to reach   [] Left message              [x]     Spoke with patient in reference to INR reminder for tomorrow. Patient states that he will be in the hospital tomorrow for his [address] [place], [place]

## 2023-05-17 ENCOUNTER — HOSPITAL ENCOUNTER (OUTPATIENT)
Facility: HOSPITAL | Age: 78
Discharge: HOME | End: 2023-05-17
Attending: INTERNAL MEDICINE | Admitting: INTERNAL MEDICINE
Payer: MEDICARE

## 2023-05-17 ENCOUNTER — ANTI-COAG VISIT (OUTPATIENT)
Age: 78
End: 2023-05-17

## 2023-05-17 ENCOUNTER — APPOINTMENT (OUTPATIENT)
Facility: HOSPITAL | Age: 78
End: 2023-05-17
Attending: INTERNAL MEDICINE
Payer: MEDICARE

## 2023-05-17 VITALS
TEMPERATURE: 98.1 F | SYSTOLIC BLOOD PRESSURE: 88 MMHG | WEIGHT: 212 LBS | OXYGEN SATURATION: 96 % | BODY MASS INDEX: 28.1 KG/M2 | DIASTOLIC BLOOD PRESSURE: 88 MMHG | RESPIRATION RATE: 16 BRPM | HEART RATE: 70 BPM | HEIGHT: 73 IN

## 2023-05-17 DIAGNOSIS — I50.9 CONGESTIVE HEART FAILURE, UNSPECIFIED HF CHRONICITY, UNSPECIFIED HEART FAILURE TYPE (HCC): ICD-10-CM

## 2023-05-17 LAB
ECHO BSA: 2.23 M2
ECHO BSA: 2.23 M2
ECHO LV FRACTIONAL SHORTENING: 20 % (ref 28–44)
ECHO LV INTERNAL DIMENSION DIASTOLE INDEX: 2.22 CM/M2
ECHO LV INTERNAL DIMENSION DIASTOLIC: 4.9 CM (ref 4.2–5.9)
ECHO LV INTERNAL DIMENSION SYSTOLIC INDEX: 1.76 CM/M2
ECHO LV INTERNAL DIMENSION SYSTOLIC: 3.9 CM
ECHO LV IVSD: 1.2 CM (ref 0.6–1)
ECHO LV MASS 2D: 200.5 G (ref 88–224)
ECHO LV MASS INDEX 2D: 90.7 G/M2 (ref 49–115)
ECHO LV POSTERIOR WALL DIASTOLIC: 1 CM (ref 0.6–1)
ECHO LV RELATIVE WALL THICKNESS RATIO: 0.41
ECHO RV INTERNAL DIMENSION: 4.3 CM
GLUCOSE BLD STRIP.AUTO-MCNC: 99 MG/DL (ref 65–117)
INR BLD: 1.5
INR BLD: 1.5
SERVICE CMNT-IMP: NORMAL

## 2023-05-17 PROCEDURE — C1751 CATH, INF, PER/CENT/MIDLINE: HCPCS | Performed by: INTERNAL MEDICINE

## 2023-05-17 PROCEDURE — 99152 MOD SED SAME PHYS/QHP 5/>YRS: CPT | Performed by: INTERNAL MEDICINE

## 2023-05-17 PROCEDURE — 82962 GLUCOSE BLOOD TEST: CPT

## 2023-05-17 PROCEDURE — C1894 INTRO/SHEATH, NON-LASER: HCPCS | Performed by: INTERNAL MEDICINE

## 2023-05-17 PROCEDURE — 6360000002 HC RX W HCPCS: Performed by: INTERNAL MEDICINE

## 2023-05-17 PROCEDURE — 2709999900 HC NON-CHARGEABLE SUPPLY: Performed by: INTERNAL MEDICINE

## 2023-05-17 PROCEDURE — 93308 TTE F-UP OR LMTD: CPT

## 2023-05-17 PROCEDURE — C1769 GUIDE WIRE: HCPCS | Performed by: INTERNAL MEDICINE

## 2023-05-17 PROCEDURE — 85610 PROTHROMBIN TIME: CPT

## 2023-05-17 PROCEDURE — 93451 RIGHT HEART CATH: CPT | Performed by: INTERNAL MEDICINE

## 2023-05-17 PROCEDURE — C1713 ANCHOR/SCREW BN/BN,TIS/BN: HCPCS | Performed by: INTERNAL MEDICINE

## 2023-05-17 PROCEDURE — 2500000003 HC RX 250 WO HCPCS: Performed by: INTERNAL MEDICINE

## 2023-05-17 RX ORDER — NITROGLYCERIN 0.3 MG/1
TABLET SUBLINGUAL PRN
Status: DISCONTINUED | OUTPATIENT
Start: 2023-05-17 | End: 2023-05-17 | Stop reason: HOSPADM

## 2023-05-17 RX ORDER — FENTANYL CITRATE 50 UG/ML
INJECTION, SOLUTION INTRAMUSCULAR; INTRAVENOUS PRN
Status: DISCONTINUED | OUTPATIENT
Start: 2023-05-17 | End: 2023-05-17 | Stop reason: HOSPADM

## 2023-05-17 RX ORDER — LIDOCAINE HYDROCHLORIDE 10 MG/ML
INJECTION, SOLUTION INFILTRATION; PERINEURAL PRN
Status: DISCONTINUED | OUTPATIENT
Start: 2023-05-17 | End: 2023-05-17 | Stop reason: HOSPADM

## 2023-05-17 RX ORDER — SODIUM CHLORIDE 9 MG/ML
INJECTION, SOLUTION INTRAVENOUS PRN
Status: DISCONTINUED | OUTPATIENT
Start: 2023-05-17 | End: 2023-05-17 | Stop reason: HOSPADM

## 2023-05-17 ASSESSMENT — PAIN - FUNCTIONAL ASSESSMENT
PAIN_FUNCTIONAL_ASSESSMENT: NONE - DENIES PAIN

## 2023-05-17 NOTE — PROGRESS NOTES
Cardiac Cath Lab Recovery Arrival Note:      [facility] arrived to Cardiac Cath Lab, Recovery Area. Staff introduced to patient. Patient identifiers verified with NAME and DATE OF BIRTH. Procedure verified with patient. Consent forms reviewed and signed by patient or authorized representative and verified. Allergies verified. Patient and family oriented to department. Patient and family informed of procedure and plan of care. Questions answered with review. Patient prepped for procedure, per orders from physician, prior to arrival.    Patient on cardiac monitor, non-invasive blood pressure, SPO2 monitor. On RA. Patient is A&Ox 4. Patient reports no complaints. Patient in stretcher, in low position, with side rails up, call bell within reach, patient instructed to call if assistance as needed. Patient prep in: Fast Track 6. Patient family has pager #   Family in: Lanetta Harada, wife [phone].    Prep by: Laura Rivera

## 2023-05-17 NOTE — PROGRESS NOTES
TRANSFER - IN Cath Lab RR REPORT:    Verbal report received from [facility] on [facility]  being received from the Cardiac Cath Lab for routine progression of care. Report consisted of patients Situation, Background, Assessment and Recommendations(SBAR). Procedural summary and MAR reviewed with receiving nurse. Opportunity for questions and clarification was provided. Assessment completed upon patients arrival to [address] [place] [address] and care assumed. Cardiac Cath Lab Recovery Arrival Note:    [address] [place] [address] arrived to [facility] area. Patient procedure= RHC. Patient on cardiac monitor, non-invasive blood pressure, SPO2 monitor. On RA. IV  of NS on pump at 999 ml/hr, to finish 250NS bolus. Patient is A&Ox 4. Patient reports no complaints. PROCEDURE SITE CHECK:    Procedure site: No bleeding and no hematoma, no pain/discomfort reported at procedure site. No change in patient status. Continue to monitor patient and status. 950am    Pt given snack and ginger ale. Denies any complaints at this time. 1020am    Walked pt to bathroom. Pt denies weakness, lightheadedness, SOB, or chest pain. Vital signs as noted.

## 2023-05-17 NOTE — DISCHARGE INSTRUCTIONS
Increase Entresto to 2 pills twice daily.  When you refill Lo Kenyon it will be a higher dose tablet 49-51 mg twice a day    Take Warfarin 7.5 mg today and tomorrow and recheck INR on Friday

## 2023-05-17 NOTE — PROGRESS NOTES
Ambulated patient to the bathroom with a steady gait, voided without difficulty. Returned to stretcher. Vital signs stable. Patient dressed self. Reviewed discharge instructions with patient and patient's wife using teach back method. Answered all questions. Verbalized understanding. Removed peripheral IV    Escorted to discharge area in a wheelchair with all of their belongings. PT DISCHARGED.

## 2023-05-17 NOTE — PROGRESS NOTES
CATH LAB to RECOVERY ROOM REPORT    Procedure: RHC with LVAD    MD: Alcides Buckley MD    The procedure was diagnostic only. Verbal Report given to Recovery Nurse on patient being transferred to Cardiac Cath Lab  for routine post-op. Patient stable upon transfer to . Vitals, mental status, MAR, procedural summary discussed with recovery RN. Medication given during procedure:                                                                Fentanyl:25mcg          0.4mg SL nitro x1 for HTN                                                  Sheaths:        Right brachial vein sheath pulled at 9:05 am, secured with a tegaderm.

## 2023-05-17 NOTE — PROGRESS NOTES
[address] [address]        Procedure: Right Heart Cath        Pre-procedure: 4802  Pump Speed (rpm): 4800  Pump Flow (lpm): 3.2  MAP (mmHg): 636 (strong systolic pulse)  Set Low Speed (rpm): 4800  Pump Pulse Index (PI): 9.3  Pump Power (Ji): 3.2  Backup Controller Present: Yes  Driveline Dressing: Clean, Dry and Intact      Speed:  4800  LvIdd:  4.9  RV dimension:  4.3  Aortic Valve: Aortic valve partially opens,  No aortic insufficiency   MV: Mild MR   TV: Trace TR   Septal position: Midline       Fixed Speed:4800  Low Speed:4400     Pump Speed (rpm): 4800  Pump Flow (lpm): 3.7  MAP (mmHg): 80 (palpable pulse, MD notified)  Set Low Speed (rpm): 4800  Pump Pulse Index (PI): 7.1  Pump Power (Ji): 3.1  Backup Controller Present: Yes  Driveline Dressing: Clean, Dry and Intact      VAD Coordinator managed LVAD equipment during procedure.

## 2023-05-18 ENCOUNTER — TELEPHONE (OUTPATIENT)
Age: 78
End: 2023-05-18

## 2023-05-18 NOTE — TELEPHONE ENCOUNTER
Contacted patient via phone for INR reminder. Telephone Call RE:  INR Reminder      Outcome:     [x] Patient verbalizes understanding    [] Unable to reach   [] Left message              [x]     Spoke with patient's wife in reference to INR reminder for tomorrow. Patient agrees to conduct INR and self report.     Mariann Sun, [address] [place] [address]

## 2023-05-19 ENCOUNTER — ANTI-COAG VISIT (OUTPATIENT)
Age: 78
End: 2023-05-19

## 2023-05-19 LAB — INR BLD: 1.7

## 2023-05-22 ENCOUNTER — ANTI-COAG VISIT (OUTPATIENT)
Age: 78
End: 2023-05-22

## 2023-05-22 ENCOUNTER — NURSE ONLY (OUTPATIENT)
Age: 78
End: 2023-05-22
Payer: MEDICARE

## 2023-05-22 DIAGNOSIS — Z95.811 S/P VENTRICULAR ASSIST DEVICE (HCC): Primary | ICD-10-CM

## 2023-05-22 LAB — INR BLD: 2.4

## 2023-05-22 PROCEDURE — 99211 OFF/OP EST MAY X REQ PHY/QHP: CPT | Performed by: NURSE PRACTITIONER

## 2023-05-22 PROCEDURE — 93750 INTERROGATION VAD IN PERSON: CPT | Performed by: NURSE PRACTITIONER

## 2023-05-22 NOTE — PATIENT INSTRUCTIONS
Medication changes:    None       Testing Ordered:    None     Other Recommendations:     Contact [facility] by phone or pager for any continued alarms. 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 Your urine should be clear and light yellow straw colored. Follow up as previously scheduled with Marino Russell      For further patient and caregiver resources as well as access to online LVAD support groups visit [email or web address]       Please bring your daily sheets and medications to your next appointment. Please monitor your weights daily upon waking and after using the bathroom. Keep a written records of your weights and bring to your next appointment. If you have a weight gain of 3 or more pounds overnight OR 5 or more pounds in one week please contact our office. Thank you for allowing us the privilege of being a part of your healthcare team! Please do not hesitate to contact our office at [phone] with any questions or concerns. Virtual Heart Failure [address] invites you to learn more about heart failure and to share your questions, ideas, and experiences with others. Each month, the Heart Failure Support Group features a new educational topic and a guest speaker, followed by an interactive discussion. Our Heart Failure Nurse Navigator will moderate each session. You will be able to participate by phone, tablet or computer through [address] [place] [address]. This support group takes place on the 3rd Thursday of each month from 6:00-7:30PM. All individuals living with heart failure and their caregivers are welcome to join. If you are interested in participating, please contact us at [email or web address] and you will be sent the link to join the [employer].

## 2023-05-22 NOTE — PROGRESS NOTES
[address] [facility] in [place], [place]  LVAD Outpatient Nurse Visit    Chief Complaint   Patient presents with    Other     LVAD alarm check            LVAD  LVAD Type[de-identified] Left Ventricular Assist Device (LVAD)  Pump Speed (rpm): 4800  Pump Flow (lpm): 3.5  Set Low Speed (rpm): 4800  Pump Pulse Index (PI): 9.4  Pump Power (Ji): 3.3  Battery Life Checked: Yes  Backup Controller Present: Yes  Driveline Dressing: Clean, Dry and Intact  Outpatient: Yes       Tricia Harris is a 69 y.o. male with a history of chronic systolic heart failure due to ischemic cardiomyopathy with Heartmate 3 implant date of 4/5/22. Patient was seen in clinic for eval of  \"yellow wrench\" alarm. No alarm observed at clinic visit. LVAD interrogation completed. Notable for frequent PI events and two \"replace back up battery\" alarms 05/21/23 at about 2100. Medications reviewed. Log files downloaded sent for eval.. Reviewed all information with Marilyn Dangelo who recommended V.O.R.B check back up battery for appropriate connection. Removed patient back up battery and reconnected, all connections secure and no alarm noted. All instructions placed in after visit summary and reviewed with patient. Education provided on contacting [facility] for continued alarms. Time given to ask questions. Patient verbalized understanding and had no further questions.

## 2023-05-23 ENCOUNTER — APPOINTMENT (OUTPATIENT)
Facility: HOSPITAL | Age: 78
End: 2023-05-23
Payer: MEDICARE

## 2023-05-23 ENCOUNTER — HOSPITAL ENCOUNTER (OUTPATIENT)
Facility: HOSPITAL | Age: 78
Discharge: HOME | End: 2023-05-25
Payer: MEDICARE

## 2023-05-23 VITALS — HEIGHT: 73 IN | WEIGHT: 212.08 LBS | BODY MASS INDEX: 28.11 KG/M2

## 2023-05-23 DIAGNOSIS — I50.20 HFREF (HEART FAILURE WITH REDUCED EJECTION FRACTION) (HCC): ICD-10-CM

## 2023-05-23 LAB
ECHO AO ROOT DIAM: 4 CM
ECHO AO ROOT INDEX: 1.81 CM/M2
ECHO AV AREA PEAK VELOCITY: 0.4 CM2
ECHO AV AREA/BSA PEAK VELOCITY: 0.2 CM2/M2
ECHO AV PEAK GRADIENT: 16 MMHG
ECHO AV PEAK VELOCITY: 2 M/S
ECHO AV VELOCITY RATIO: 0.1
ECHO BSA: 2.23 M2
ECHO LA DIAMETER INDEX: 1.22 CM/M2
ECHO LA DIAMETER: 2.7 CM
ECHO LA TO AORTIC ROOT RATIO: 0.68
ECHO LV E' LATERAL VELOCITY: 8 CM/S
ECHO LV E' SEPTAL VELOCITY: 3 CM/S
ECHO LV FRACTIONAL SHORTENING: 11 % (ref 28–44)
ECHO LV INTERNAL DIMENSION DIASTOLE INDEX: 2.04 CM/M2
ECHO LV INTERNAL DIMENSION DIASTOLIC: 4.5 CM (ref 4.2–5.9)
ECHO LV INTERNAL DIMENSION SYSTOLIC INDEX: 1.81 CM/M2
ECHO LV INTERNAL DIMENSION SYSTOLIC: 4 CM
ECHO LV IVSD: 1.8 CM (ref 0.6–1)
ECHO LV MASS 2D: 319.6 G (ref 88–224)
ECHO LV MASS INDEX 2D: 144.6 G/M2 (ref 49–115)
ECHO LV POSTERIOR WALL DIASTOLIC: 1.5 CM (ref 0.6–1)
ECHO LV RELATIVE WALL THICKNESS RATIO: 0.67
ECHO LVOT AREA: 3.8 CM2
ECHO LVOT DIAM: 2.2 CM
ECHO LVOT PEAK GRADIENT: 0 MMHG
ECHO LVOT PEAK VELOCITY: 0.2 M/S
ECHO MV A VELOCITY: 0.3 M/S
ECHO MV E DECELERATION TIME (DT): 224.8 MS
ECHO MV E VELOCITY: 0.72 M/S
ECHO MV E/A RATIO: 2.4
ECHO MV E/E' LATERAL: 9
ECHO MV E/E' RATIO (AVERAGED): 16.5
ECHO MV E/E' SEPTAL: 24
ECHO RV FREE WALL PEAK S': 3 CM/S
ECHO RV TAPSE: 1 CM (ref 1.7–?)
ECHO TV REGURGITANT MAX VELOCITY: 2.06 M/S
ECHO TV REGURGITANT PEAK GRADIENT: 17 MMHG

## 2023-05-23 PROCEDURE — 93306 TTE W/DOPPLER COMPLETE: CPT | Performed by: INTERNAL MEDICINE

## 2023-05-23 PROCEDURE — 93306 TTE W/DOPPLER COMPLETE: CPT

## 2023-05-25 ENCOUNTER — NURSE ONLY (OUTPATIENT)
Age: 78
End: 2023-05-25

## 2023-05-25 ENCOUNTER — TELEPHONE (OUTPATIENT)
Age: 78
End: 2023-05-25

## 2023-05-25 DIAGNOSIS — Z95.811 PRESENCE OF HEART ASSIST DEVICE (HCC): Primary | ICD-10-CM

## 2023-05-25 DIAGNOSIS — Z95.811 S/P VENTRICULAR ASSIST DEVICE (HCC): ICD-10-CM

## 2023-05-25 NOTE — PROGRESS NOTES
Raji Green is a 69 y.o. male with a history of ICM with Heartmate 3 implant date of 04/05/22. Patient was seen in clinic due to back up battery fault alarms. Controller exchanged to ensure safe operation of device per  guidelines.

## 2023-05-25 NOTE — PROGRESS NOTES
[address] [facility] in [place], [place]  LVAD Outpatient Nurse Visit    Chief Complaint   Patient presents with    Other     Lvad alarms          LVAD  LVAD Type[de-identified] Left Ventricular Assist Device (LVAD)  Pump Speed (rpm): 4800  Pump Flow (lpm): 4  Set Low Speed (rpm): 4800  Pump Pulse Index (PI): 6  Pump Power (Ji): 3.3  Backup Controller Present: Yes  Driveline Dressing: Clean, Dry and Intact  Outpatient: Yes     LVAD interrogation completed in clinic, results reported to provider. See flow sheet for details. No notable change after controller exchange. Jared Sorto is a 69 y.o. male with a history of ICM with Heartmate 3 implant date of 04/05/22. Patient was seen in clinic for nurse visit for LVAD  alarms. Specifically getting \"back up battery fault\" alarm. No alarm noted in clinic however pt notes yellow wrench was illuminated. LVAD interrogation completed. Reviewed all information with Cassandra Segnudo who recommended V.O.R.B  exchange.  exchanged without incident, patient tolerated well. All instructions reviewed with patient. Educated on charging back up battery in new 2nd , contacting for further alarms. Time given to ask questions. Patient verbalized understanding and had no further questions.

## 2023-05-25 NOTE — TELEPHONE ENCOUNTER
Received call from patient's wife stating he had a \"back up battery fault\" alarm. (This also happened last week see nurse visit) Reviewed with Jena Hodge who ordered VORB have patient come to clinic for  exchange. Spoke with patient's wife who confirmed they will come to clinic at 1430 today.

## 2023-06-02 ENCOUNTER — TELEPHONE (OUTPATIENT)
Age: 78
End: 2023-06-02

## 2023-06-02 ENCOUNTER — ANTI-COAG VISIT (OUTPATIENT)
Age: 78
End: 2023-06-02

## 2023-06-02 LAB — INR BLD: 2.1

## 2023-06-09 ENCOUNTER — TELEPHONE (OUTPATIENT)
Age: 78
End: 2023-06-09

## 2023-06-09 NOTE — TELEPHONE ENCOUNTER
Returned call to patient's wife. Linda Myers stated patient had skin irritation at driveline dressing site. She stated she cleaned site, applied skin prep to site and appears better. Lola Real to keep an eye on driveline exit site and to call if skin irritation continue or worsens. Linda Myers verbalized understanding.

## 2023-06-22 ENCOUNTER — SOCIAL WORK (OUTPATIENT)
Age: 78
End: 2023-06-22

## 2023-06-22 NOTE — PROGRESS NOTES
Pt. Attended group yesterday evening along with his wife and adult son. Participated in discussion. Pt. Was somewhat reserved. Affect/mood WNR.

## 2023-06-23 ENCOUNTER — TELEPHONE (OUTPATIENT)
Age: 78
End: 2023-06-23

## 2023-06-23 ENCOUNTER — NURSE ONLY (OUTPATIENT)
Age: 78
End: 2023-06-23

## 2023-06-23 VITALS — HEART RATE: 40 BPM | SYSTOLIC BLOOD PRESSURE: 110 MMHG | OXYGEN SATURATION: 99 % | RESPIRATION RATE: 16 BRPM

## 2023-06-23 DIAGNOSIS — Z95.811 PRESENCE OF HEART ASSIST DEVICE (HCC): ICD-10-CM

## 2023-06-23 DIAGNOSIS — R68.89 SUSPECTED SOFT TISSUE INFECTION: Primary | ICD-10-CM

## 2023-06-23 RX ORDER — DOXYCYCLINE HYCLATE 100 MG
100 TABLET ORAL 2 TIMES DAILY
Qty: 20 TABLET | Refills: 0 | Status: SHIPPED | OUTPATIENT
Start: 2023-06-23 | End: 2023-07-03

## 2023-06-23 NOTE — PATIENT INSTRUCTIONS
Medication changes:    START doxycycline 100mg twice a day     Testing Ordered:    Wound culture collected in clinic today     Other Recommendations:     Do not shower until directed by [facility]. Begin daily dressing changes until otherwise directed by [facility]. 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 Your urine should be clear and light yellow straw colored. Follow up as previously scheduled with Marino Russell      For further patient and caregiver resources as well as access to online LVAD support groups visit [email or web address]       Please bring your daily sheets and medications to your next appointment. Please monitor your weights daily upon waking and after using the bathroom. Keep a written records of your weights and bring to your next appointment. If you have a weight gain of 3 or more pounds overnight OR 5 or more pounds in one week please contact our office. Thank you for allowing us the privilege of being a part of your healthcare team! Please do not hesitate to contact our office at [phone] with any questions or concerns.

## 2023-06-23 NOTE — PROGRESS NOTES
[address]  LVAD Outpatient Nurse Visit    Chief Complaint   Patient presents with    Other     Drive line culture        BP (!) 110/0 Comment: pulse palpable  Pulse (!) 40   Resp 16   SpO2 99%      LVAD  LVAD Type[de-identified] Left Ventricular Assist Device (LVAD)  Pump Speed (rpm): 4850  Pump Flow (lpm): 3.3  MAP (mmHg): 110 (palpable pulse)  Set Low Speed (rpm): 4800  Pump Pulse Index (PI): 9.8  Pump Power (Ji): 3.3  Backup Controller Present: Yes  Driveline Dressing: Changed per order  Outpatient: Yes  MAP in Therapeutic Range (Outpatient): No         Cayden Ellsworth is a 69 y.o. male with a history of ICM with Heartmate 3  implant date of 04/05/22. Patient was seen in clinic for nurse visit for wound check and culture collection. Patient's wife states small brown drainage yesterday that became bright blood this morning. Patient thinks he might have dropped controller but states it was very subtle if he did and doesn't feel it pulled his driveline. LVAD interrogation completed. Notable for PI events.  with palpable pulse. Driveline dressing change completed using sterile technique. Driveline site with scant yellow to brown serous drainage. No redness, tenderness noted. No areas of open tissue noted. Culture collected per order. Medications reviewed. Reviewed all information with Dr. Tanisha Bedoya who recommended V.O.R.B collect wound culture and doxycyline BID. All ordered entered per VORB. All instructions placed in after visit summary and reviewed with patient. Education provided on daily dressing changes and no showers until directed by [facility]. Time given to ask questions. Patient verbalized understanding and had no further questions.

## 2023-06-23 NOTE — TELEPHONE ENCOUNTER
Patient's wife called and left message noting brown and bloody drainage from driveline over the past several days. Denies fever or chills. Reviewed the above with Dr. Ely Verdugo who stated Sarah Alt have patient come for nurse visit for drive line culture.      Spoke with patient's wife who confirmed they will present to clinic today for driveline check

## 2023-06-28 LAB
BACTERIA SPEC AEROBE CULT: NORMAL
BACTERIA SPEC ANAEROBE CULT: NORMAL

## 2023-06-29 ENCOUNTER — TELEPHONE (OUTPATIENT)
Age: 78
End: 2023-06-29

## 2023-07-03 ENCOUNTER — TELEPHONE (OUTPATIENT)
Age: 78
End: 2023-07-03

## 2023-07-05 ENCOUNTER — NURSE ONLY (OUTPATIENT)
Age: 78
End: 2023-07-05

## 2023-07-05 ENCOUNTER — TELEPHONE (OUTPATIENT)
Age: 78
End: 2023-07-05

## 2023-07-05 ENCOUNTER — ANTI-COAG VISIT (OUTPATIENT)
Age: 78
End: 2023-07-05

## 2023-07-05 VITALS
WEIGHT: 213.6 LBS | RESPIRATION RATE: 18 BRPM | BODY MASS INDEX: 28.97 KG/M2 | SYSTOLIC BLOOD PRESSURE: 110 MMHG | TEMPERATURE: 97.8 F | HEART RATE: 76 BPM | OXYGEN SATURATION: 98 %

## 2023-07-05 DIAGNOSIS — R68.89 SUSPECTED SOFT TISSUE INFECTION: Primary | ICD-10-CM

## 2023-07-05 DIAGNOSIS — I50.20 HFREF (HEART FAILURE WITH REDUCED EJECTION FRACTION) (HCC): ICD-10-CM

## 2023-07-05 DIAGNOSIS — Z95.811 S/P VENTRICULAR ASSIST DEVICE (HCC): ICD-10-CM

## 2023-07-05 LAB — INR BLD: 1.9

## 2023-07-05 RX ORDER — DOXYCYCLINE HYCLATE 100 MG
100 TABLET ORAL 2 TIMES DAILY
Qty: 20 TABLET | Refills: 0 | Status: SHIPPED | OUTPATIENT
Start: 2023-07-05 | End: 2023-07-15

## 2023-07-05 RX ORDER — HYDRALAZINE HYDROCHLORIDE 25 MG/1
37.5 TABLET, FILM COATED ORAL 3 TIMES DAILY
Qty: 30 TABLET | Refills: 2 | Status: SHIPPED | OUTPATIENT
Start: 2023-07-05

## 2023-07-05 NOTE — TELEPHONE ENCOUNTER
Spoke with patient's wife regarding instructions from visit today. Educated on provider instructions regarding new script for doxycycline and hydralazine (dose change and new tablet strength), continue daily dressing changes. She verbalized understanding.

## 2023-07-05 NOTE — PATIENT INSTRUCTIONS
Medication changes:    None at this time, we will call you today if the provider wishes to make medication changes based on your blood pressure and wound assessment today       Testing Ordered:    Wound culture collected today- Continue daily dressing changes and do not shower until further instructed by [facility]   INR collected today- we will contact you today with coumadin dosing instructions. 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 Your urine should be clear and light yellow straw colored. Follow up as previously scheduled with [address]      For further patient and caregiver resources as well as access to online LVAD support groups visit [email or web address]       Please bring your daily sheets and medications to your next appointment. Please monitor your weights daily upon waking and after using the bathroom. Keep a written records of your weights and bring to your next appointment. If you have a weight gain of 3 or more pounds overnight OR 5 or more pounds in one week please contact our office. Thank you for allowing us the privilege of being a part of your healthcare team! Please do not hesitate to contact our office at [phone] with any questions or concerns.

## 2023-07-07 LAB — SPECIMEN STATUS REPORT: NORMAL

## 2023-07-09 LAB
BACTERIA SPEC AEROBE CULT: NORMAL
BACTERIA SPEC ANAEROBE CULT: NORMAL

## 2023-07-10 LAB
BACTERIA SPEC AEROBE CULT: NORMAL
BACTERIA SPEC ANAEROBE CULT: NORMAL

## 2023-07-11 ENCOUNTER — TELEPHONE (OUTPATIENT)
Age: 78
End: 2023-07-11

## 2023-07-11 DIAGNOSIS — Z95.811 S/P VENTRICULAR ASSIST DEVICE (HCC): ICD-10-CM

## 2023-07-11 DIAGNOSIS — Z79.01 CHRONIC ANTICOAGULATION: ICD-10-CM

## 2023-07-11 DIAGNOSIS — R06.02 SHORTNESS OF BREATH: ICD-10-CM

## 2023-07-11 DIAGNOSIS — Z79.899 ENCOUNTER FOR MONITORING DIURETIC THERAPY: ICD-10-CM

## 2023-07-11 DIAGNOSIS — I50.9 CONGESTIVE HEART FAILURE, UNSPECIFIED HF CHRONICITY, UNSPECIFIED HEART FAILURE TYPE (HCC): ICD-10-CM

## 2023-07-11 DIAGNOSIS — I50.20 HFREF (HEART FAILURE WITH REDUCED EJECTION FRACTION) (HCC): ICD-10-CM

## 2023-07-11 DIAGNOSIS — R68.89 SUSPECTED SOFT TISSUE INFECTION: Primary | ICD-10-CM

## 2023-07-11 DIAGNOSIS — Z51.81 ENCOUNTER FOR MONITORING DIURETIC THERAPY: ICD-10-CM

## 2023-07-11 NOTE — TELEPHONE ENCOUNTER
Contacted patient via phone for lab reminder. Telephone Call RE:  Lab Reminder      Outcome:     [x] Patient verbalizes understanding    [] Unable to reach   [] Left message              [x] Labs faxed to desired lab.       [facility]

## 2023-07-12 DIAGNOSIS — Z95.811 S/P VENTRICULAR ASSIST DEVICE (HCC): ICD-10-CM

## 2023-07-12 DIAGNOSIS — I50.9 CONGESTIVE HEART FAILURE, UNSPECIFIED HF CHRONICITY, UNSPECIFIED HEART FAILURE TYPE (HCC): ICD-10-CM

## 2023-07-12 DIAGNOSIS — Z79.899 ENCOUNTER FOR MONITORING DIURETIC THERAPY: ICD-10-CM

## 2023-07-12 DIAGNOSIS — Z51.81 ENCOUNTER FOR MONITORING DIURETIC THERAPY: ICD-10-CM

## 2023-07-12 DIAGNOSIS — Z79.01 CHRONIC ANTICOAGULATION: ICD-10-CM

## 2023-07-12 DIAGNOSIS — R06.02 SHORTNESS OF BREATH: ICD-10-CM

## 2023-07-12 LAB
BASOPHILS # BLD AUTO: 0 X10E3/UL (ref 0–0.2)
BASOPHILS NFR BLD AUTO: 1 %
EOSINOPHIL # BLD AUTO: 0.1 X10E3/UL (ref 0–0.4)
EOSINOPHIL NFR BLD AUTO: 3 %
ERYTHROCYTE [DISTWIDTH] IN BLOOD BY AUTOMATED COUNT: 14.5 % (ref 11.6–15.4)
HCT VFR BLD AUTO: 44.1 % (ref 37.5–51)
HGB BLD-MCNC: 14.1 G/DL (ref 13–17.7)
IMM GRANULOCYTES # BLD AUTO: 0 X10E3/UL (ref 0–0.1)
IMM GRANULOCYTES NFR BLD AUTO: 1 %
LYMPHOCYTES # BLD AUTO: 0.7 X10E3/UL (ref 0.7–3.1)
LYMPHOCYTES NFR BLD AUTO: 19 %
MCH RBC QN AUTO: 25.7 PG (ref 26.6–33)
MCHC RBC AUTO-ENTMCNC: 32 G/DL (ref 31.5–35.7)
MCV RBC AUTO: 80 FL (ref 79–97)
MONOCYTES # BLD AUTO: 0.5 X10E3/UL (ref 0.1–0.9)
MONOCYTES NFR BLD AUTO: 13 %
NEUTROPHILS # BLD AUTO: 2.5 X10E3/UL (ref 1.4–7)
NEUTROPHILS NFR BLD AUTO: 63 %
PLATELET # BLD AUTO: 207 X10E3/UL (ref 150–450)
RBC # BLD AUTO: 5.49 X10E6/UL (ref 4.14–5.8)
WBC # BLD AUTO: 3.8 X10E3/UL (ref 3.4–10.8)

## 2023-07-13 ENCOUNTER — ANTI-COAG VISIT (OUTPATIENT)
Age: 78
End: 2023-07-13

## 2023-07-13 ENCOUNTER — TELEPHONE (OUTPATIENT)
Age: 78
End: 2023-07-13

## 2023-07-13 LAB
ALBUMIN SERPL-MCNC: 4.3 G/DL (ref 3.8–4.8)
ALBUMIN/GLOB SERPL: 1.6 {RATIO} (ref 1.2–2.2)
ALP SERPL-CCNC: 138 IU/L (ref 44–121)
ALT SERPL-CCNC: 7 IU/L (ref 0–44)
AST SERPL-CCNC: 39 IU/L (ref 0–40)
BILIRUB SERPL-MCNC: 0.5 MG/DL (ref 0–1.2)
BUN SERPL-MCNC: 21 MG/DL (ref 8–27)
BUN/CREAT SERPL: 16 (ref 10–24)
CALCIUM SERPL-MCNC: 9.7 MG/DL (ref 8.6–10.2)
CHLORIDE SERPL-SCNC: 103 MMOL/L (ref 96–106)
CO2 SERPL-SCNC: 23 MMOL/L (ref 20–29)
CREAT SERPL-MCNC: 1.31 MG/DL (ref 0.76–1.27)
EGFRCR SERPLBLD CKD-EPI 2021: 56 ML/MIN/1.73
GLOBULIN SER CALC-MCNC: 2.7 G/DL (ref 1.5–4.5)
GLUCOSE SERPL-MCNC: 92 MG/DL (ref 70–99)
INR PPP: 1.7 (ref 0.9–1.2)
LDH SERPL L TO P-CCNC: 182 IU/L (ref 121–224)
MAGNESIUM SERPL-MCNC: 2.1 MG/DL (ref 1.6–2.3)
NT-PROBNP SERPL-MCNC: 852 PG/ML (ref 0–486)
POTASSIUM SERPL-SCNC: 4.5 MMOL/L (ref 3.5–5.2)
PROT SERPL-MCNC: 7 G/DL (ref 6–8.5)
PROTHROMBIN TIME: 17.7 SEC (ref 9.1–12)
SODIUM SERPL-SCNC: 139 MMOL/L (ref 134–144)

## 2023-07-13 NOTE — TELEPHONE ENCOUNTER
Patient's wife called and stated patient has had no drainage from driveline exit site for 4 days. Reviewed wound culture result collected on 7/5/23 which shows NGTD. Caitlyn Zhang inquired if patient could start showering.

## 2023-07-13 NOTE — TELEPHONE ENCOUNTER
Reviewed with Terence Wong NP who recommended VORB patient may resume showering, but should complete current course of antibiotics. Per Terence Wong NP follow up nurse visit not indicated as long as no drainage from driveline exit site, but if patient does experience drainage should be scheduled for nurse visit for further assessment. Contacted patient's wife to notify. She verbalized understanding of instructions. She stated they will be traveling to [place], [place] 8/4/23-8/6/23. Informed her will mail contact information for [facility] in case of emergency and also send basic records to [facility]. She verbalized understanding and had no further questions. Contact information mailed. Records faxed to [facility] this date.  Linda Encinas RN

## 2023-07-18 ENCOUNTER — TELEPHONE (OUTPATIENT)
Age: 78
End: 2023-07-18

## 2023-07-18 NOTE — TELEPHONE ENCOUNTER
Contacted patient via phone for INR reminder.     Telephone Call RE:  INR Reminder      Outcome:     [] Patient verbalizes understanding    [x] Unable to reach   [x] Left message              []     Left a message for patient in reference to INR reminder for tomorrow    Shamika [place], [place]

## 2023-07-18 NOTE — TELEPHONE ENCOUNTER
Support group reminder call. Pt. Wife stated they would attend and will be bringing one additional person for a total of three.

## 2023-07-24 ENCOUNTER — SOCIAL WORK (OUTPATIENT)
Age: 78
End: 2023-07-24

## 2023-07-24 ENCOUNTER — TELEPHONE (OUTPATIENT)
Age: 78
End: 2023-07-24

## 2023-07-24 ENCOUNTER — ANTI-COAG VISIT (OUTPATIENT)
Age: 78
End: 2023-07-24

## 2023-07-24 LAB — INR BLD: 2

## 2023-07-24 NOTE — PROGRESS NOTES
Pt. Attended group on his own. Participated in discussion about group guidelines and in conversation about heart failure medications during presentation by LVAD coordinator. Reported good mood and left at end of group at 7:15, no concerns reported.

## 2023-07-24 NOTE — TELEPHONE ENCOUNTER
Called requesting overdue INR. Patient's wife states patient will complete and send to clinic. Also notified of travel resources mailed to patient's home address.

## 2023-07-25 DIAGNOSIS — I50.20 HFREF (HEART FAILURE WITH REDUCED EJECTION FRACTION) (HCC): ICD-10-CM

## 2023-07-26 RX ORDER — HYDRALAZINE HYDROCHLORIDE 25 MG/1
TABLET, FILM COATED ORAL
Qty: 30 TABLET | Refills: 2 | OUTPATIENT
Start: 2023-07-26

## 2023-07-26 NOTE — TELEPHONE ENCOUNTER
Requested Prescriptions     Refused Prescriptions Disp Refills    hydrALAZINE (APRESOLINE) 25 MG tablet [Pharmacy Med Name: HYDRALAZINE  25MG TABLETS(ORANGE)] 30 tablet 2     Sig: TAKE 1 AND 1/2 TABLETS BY MOUTH THREE TIMES DAILY     Refused By: Cirilo Cavazos     Reason for Refusal: Other

## 2023-08-01 ENCOUNTER — TELEPHONE (OUTPATIENT)
Age: 78
End: 2023-08-01

## 2023-08-01 NOTE — TELEPHONE ENCOUNTER
Contacted patient via phone for INR reminder. Telephone Call RE:  INR Reminder      Outcome:     [x] Patient verbalizes understanding    [] Unable to reach   [] Left message              [x]     Spoke with patient's wife in reference to INR reminder for tomorrow. Patient agrees to conduct INR and self report.     Brayden Grijalva

## 2023-08-02 ENCOUNTER — ANTI-COAG VISIT (OUTPATIENT)
Age: 78
End: 2023-08-02

## 2023-08-02 DIAGNOSIS — Z51.81 ENCOUNTER FOR THERAPEUTIC DRUG LEVEL MONITORING: ICD-10-CM

## 2023-08-02 DIAGNOSIS — I50.20 HFREF (HEART FAILURE WITH REDUCED EJECTION FRACTION) (HCC): Primary | ICD-10-CM

## 2023-08-02 DIAGNOSIS — R06.02 SHORTNESS OF BREATH: ICD-10-CM

## 2023-08-02 DIAGNOSIS — Z95.811 PRESENCE OF HEART ASSIST DEVICE (HCC): ICD-10-CM

## 2023-08-02 DIAGNOSIS — Z79.01 CHRONIC ANTICOAGULATION: ICD-10-CM

## 2023-08-02 DIAGNOSIS — E83.42 HYPOMAGNESEMIA: ICD-10-CM

## 2023-08-02 LAB — INR BLD: 2.3

## 2023-08-08 ENCOUNTER — TELEPHONE (OUTPATIENT)
Age: 78
End: 2023-08-08

## 2023-08-08 NOTE — TELEPHONE ENCOUNTER
Contacted patient via phone for lab reminder. Telephone Call RE:  Lab Reminder      Outcome:     [x] Patient verbalizes understanding    [] Unable to reach   [] Left message              [x] Labs faxed to desired lab.       Angelika Mcintosh, [facility]

## 2023-08-09 ENCOUNTER — NURSE ONLY (OUTPATIENT)
Age: 78
End: 2023-08-09

## 2023-08-09 ENCOUNTER — TELEPHONE (OUTPATIENT)
Age: 78
End: 2023-08-09

## 2023-08-09 DIAGNOSIS — E83.42 HYPOMAGNESEMIA: ICD-10-CM

## 2023-08-09 DIAGNOSIS — R06.02 SHORTNESS OF BREATH: ICD-10-CM

## 2023-08-09 DIAGNOSIS — Z95.811 PRESENCE OF HEART ASSIST DEVICE (HCC): ICD-10-CM

## 2023-08-09 DIAGNOSIS — I50.20 HFREF (HEART FAILURE WITH REDUCED EJECTION FRACTION) (HCC): ICD-10-CM

## 2023-08-09 DIAGNOSIS — Z95.811 S/P VENTRICULAR ASSIST DEVICE (HCC): ICD-10-CM

## 2023-08-09 DIAGNOSIS — Z51.81 ENCOUNTER FOR THERAPEUTIC DRUG LEVEL MONITORING: ICD-10-CM

## 2023-08-09 DIAGNOSIS — Z79.01 CHRONIC ANTICOAGULATION: ICD-10-CM

## 2023-08-09 DIAGNOSIS — I50.20 HFREF (HEART FAILURE WITH REDUCED EJECTION FRACTION) (HCC): Primary | ICD-10-CM

## 2023-08-09 LAB
BASOPHILS # BLD AUTO: 0 X10E3/UL (ref 0–0.2)
BASOPHILS NFR BLD AUTO: 1 %
EOSINOPHIL # BLD AUTO: 0.1 X10E3/UL (ref 0–0.4)
EOSINOPHIL NFR BLD AUTO: 2 %
ERYTHROCYTE [DISTWIDTH] IN BLOOD BY AUTOMATED COUNT: 14.7 % (ref 11.6–15.4)
HCT VFR BLD AUTO: 42.3 % (ref 37.5–51)
HGB BLD-MCNC: 13.7 G/DL (ref 13–17.7)
IMM GRANULOCYTES # BLD AUTO: 0 X10E3/UL (ref 0–0.1)
IMM GRANULOCYTES NFR BLD AUTO: 0 %
LYMPHOCYTES # BLD AUTO: 0.7 X10E3/UL (ref 0.7–3.1)
LYMPHOCYTES NFR BLD AUTO: 20 %
MCH RBC QN AUTO: 25.6 PG (ref 26.6–33)
MCHC RBC AUTO-ENTMCNC: 32.4 G/DL (ref 31.5–35.7)
MCV RBC AUTO: 79 FL (ref 79–97)
MONOCYTES # BLD AUTO: 0.4 X10E3/UL (ref 0.1–0.9)
MONOCYTES NFR BLD AUTO: 12 %
NEUTROPHILS # BLD AUTO: 2.2 X10E3/UL (ref 1.4–7)
NEUTROPHILS NFR BLD AUTO: 65 %
PLATELET # BLD AUTO: 202 X10E3/UL (ref 150–450)
RBC # BLD AUTO: 5.36 X10E6/UL (ref 4.14–5.8)
WBC # BLD AUTO: 3.4 X10E3/UL (ref 3.4–10.8)

## 2023-08-09 RX ORDER — CLOTRIMAZOLE AND BETAMETHASONE DIPROPIONATE 10; .64 MG/G; MG/G
CREAM TOPICAL
COMMUNITY
Start: 2023-07-11

## 2023-08-09 ASSESSMENT — PATIENT HEALTH QUESTIONNAIRE - PHQ9
SUM OF ALL RESPONSES TO PHQ QUESTIONS 1-9: 0
SUM OF ALL RESPONSES TO PHQ QUESTIONS 1-9: 0
SUM OF ALL RESPONSES TO PHQ9 QUESTIONS 1 & 2: 0
SUM OF ALL RESPONSES TO PHQ QUESTIONS 1-9: 0
SUM OF ALL RESPONSES TO PHQ QUESTIONS 1-9: 0
1. LITTLE INTEREST OR PLEASURE IN DOING THINGS: 0
2. FEELING DOWN, DEPRESSED OR HOPELESS: 0

## 2023-08-09 NOTE — PATIENT INSTRUCTIONS
Medication changes:    None today      Testing Ordered:    A wound culture was collected in clinic today. We will contact you with any changes to your plan of care. Other Recommendations:     Please call [facility] for continued driveline pain, drainage, increase pain at site or other changes     Continue to change drive line exit site dressing 3 times a week using sterile technique,     Ensure you are drinking an adequate amount of water with a goal of 6-8 eight ounce glasses (1.5-2 liters) of fluid daily. Your urine should be clear and light yellow straw colored. Follow up as previously scheduled with [address]      For further patient and caregiver resources as well as access to online LVAD support groups visit [email or web address]       Please bring your daily sheets and medications to your next appointment. Please monitor your weights daily upon waking and after using the bathroom. Keep a written records of your weights and bring to your next appointment. If you have a weight gain of 3 or more pounds overnight OR 5 or more pounds in one week please contact our office. Thank you for allowing us the privilege of being a part of your healthcare team! Please do not hesitate to contact our office at [phone] with any questions or concerns.

## 2023-08-09 NOTE — TELEPHONE ENCOUNTER
Received call from chance's wife requesing nurse call to patient to \"check in.\" Called and spoke with patient who stated that is having a sharp pain in his abdomen

## 2023-08-10 ENCOUNTER — ANTI-COAG VISIT (OUTPATIENT)
Age: 78
End: 2023-08-10

## 2023-08-10 LAB
ALBUMIN SERPL-MCNC: 4.1 G/DL (ref 3.8–4.8)
ALBUMIN/GLOB SERPL: 1.6 {RATIO} (ref 1.2–2.2)
ALP SERPL-CCNC: 123 IU/L (ref 44–121)
ALT SERPL-CCNC: 5 IU/L (ref 0–44)
AST SERPL-CCNC: 31 IU/L (ref 0–40)
BILIRUB SERPL-MCNC: 0.5 MG/DL (ref 0–1.2)
BUN SERPL-MCNC: 18 MG/DL (ref 8–27)
BUN/CREAT SERPL: 16 (ref 10–24)
CALCIUM SERPL-MCNC: 9.2 MG/DL (ref 8.6–10.2)
CHLORIDE SERPL-SCNC: 101 MMOL/L (ref 96–106)
CO2 SERPL-SCNC: 24 MMOL/L (ref 20–29)
CREAT SERPL-MCNC: 1.1 MG/DL (ref 0.76–1.27)
EGFRCR SERPLBLD CKD-EPI 2021: 69 ML/MIN/1.73
GLOBULIN SER CALC-MCNC: 2.6 G/DL (ref 1.5–4.5)
GLUCOSE SERPL-MCNC: 112 MG/DL (ref 70–99)
INR PPP: 1.5 (ref 0.9–1.2)
LDH SERPL L TO P-CCNC: 167 IU/L (ref 121–224)
MAGNESIUM SERPL-MCNC: 2.2 MG/DL (ref 1.6–2.3)
NT-PROBNP SERPL-MCNC: 668 PG/ML (ref 0–486)
POTASSIUM SERPL-SCNC: 4.7 MMOL/L (ref 3.5–5.2)
PROT SERPL-MCNC: 6.7 G/DL (ref 6–8.5)
PROTHROMBIN TIME: 15.4 SEC (ref 9.1–12)
SODIUM SERPL-SCNC: 137 MMOL/L (ref 134–144)

## 2023-08-10 NOTE — TELEPHONE ENCOUNTER
Called patient's wife requesting checkin call with patient. Called patient who stated he is having a sharp pain in the stomach when moving. Denies stool changes, driveline site changes or other symptoms. Reviewed with Loly Shabazz who advised patient should come in for nurse visit to have driveline evaluated. Returned call to patient with provider instructions who states he can come to clinic shortly for nurse visit.

## 2023-08-11 ENCOUNTER — TELEPHONE (OUTPATIENT)
Age: 78
End: 2023-08-11

## 2023-08-11 NOTE — TELEPHONE ENCOUNTER
Contacted patient via phone for INR reminder. Telephone Call RE:  INR Reminder      Outcome:     [x] Patient verbalizes understanding    [] Unable to reach   [] Left message              [x]     Spoke with patient's wife in reference to INR reminder for Monday. Patient agrees to conduct INR and self report.     Brayden Salcedo

## 2023-08-14 ENCOUNTER — ANTI-COAG VISIT (OUTPATIENT)
Age: 78
End: 2023-08-14

## 2023-08-14 ENCOUNTER — TELEPHONE (OUTPATIENT)
Age: 78
End: 2023-08-14

## 2023-08-14 LAB
BACTERIA SPEC AEROBE CULT: NORMAL
BACTERIA SPEC ANAEROBE CULT: NORMAL
INR BLD: 2

## 2023-08-14 NOTE — TELEPHONE ENCOUNTER
Called patients wife regarding need to reschedule upcoming appointment. States they are unable to make open slots on 08/29.  Will discuss other dates

## 2023-08-18 ENCOUNTER — TELEPHONE (OUTPATIENT)
Age: 78
End: 2023-08-18

## 2023-08-18 NOTE — TELEPHONE ENCOUNTER
Contacted patient via phone for INR reminder. Telephone Call RE:  INR Reminder      Outcome:     [x] Patient verbalizes understanding    [] Unable to reach   [] Left message              [x]     Spoke with patient's wife in reference to INR reminder for Monday. Patient agrees to conduct INR and self report.     Brayden Lyon

## 2023-08-21 ENCOUNTER — ANTI-COAG VISIT (OUTPATIENT)
Age: 78
End: 2023-08-21

## 2023-08-21 LAB — INR BLD: 1.6

## 2023-08-22 ENCOUNTER — HOSPITAL ENCOUNTER (OUTPATIENT)
Facility: HOSPITAL | Age: 78
Discharge: HOME | End: 2023-08-25
Attending: INTERNAL MEDICINE
Payer: MEDICARE

## 2023-08-22 DIAGNOSIS — C34.12 PRIMARY MALIGNANT NEOPLASM OF BRONCHUS OF LEFT UPPER LOBE (HCC): ICD-10-CM

## 2023-08-22 PROCEDURE — 6360000004 HC RX CONTRAST MEDICATION: Performed by: RADIOLOGY

## 2023-08-22 PROCEDURE — 74160 CT ABDOMEN W/CONTRAST: CPT

## 2023-08-22 RX ADMIN — IOPAMIDOL 100 ML: 755 INJECTION, SOLUTION INTRAVENOUS at 11:40

## 2023-08-23 ENCOUNTER — TELEPHONE (OUTPATIENT)
Age: 78
End: 2023-08-23

## 2023-08-23 ENCOUNTER — CLINICAL DOCUMENTATION (OUTPATIENT)
Age: 78
End: 2023-08-23

## 2023-08-23 NOTE — TELEPHONE ENCOUNTER
Contacted patient via phone for INR reminder. Telephone Call RE:  INR Reminder      Outcome:     [x] Patient verbalizes understanding    [] Unable to reach   [] Left message              [x]     Spoke with patient's wife in reference to INR reminder for tomorrow.  She states that the patient will do an in clinic INR tomorrow     [place], [place]

## 2023-08-23 NOTE — PROGRESS NOTES
Pt. And spouse attended group this evening. Pt. Mood and affect WNR and pt. Was alert and oriented. Participated in group discussion. Group discussed one year post op and life with LVAD. Member shared details about his work history, career, interests and experiences with heart failure diagnosis. Coordinator provided information about heart pump and answerers specific medical questions. SW reviewed upcomming group topics and reminders about confidentiality. Pt. Responded positively to life review questions prompting him to explore and reflect on his experience with the LVAD after one year. Coordinator also reviewed some online resources with the group.

## 2023-08-24 ENCOUNTER — ANTI-COAG VISIT (OUTPATIENT)
Age: 78
End: 2023-08-24

## 2023-08-24 ENCOUNTER — OFFICE VISIT (OUTPATIENT)
Age: 78
End: 2023-08-24

## 2023-08-24 VITALS
WEIGHT: 217 LBS | RESPIRATION RATE: 18 BRPM | HEIGHT: 72 IN | HEART RATE: 86 BPM | TEMPERATURE: 97.1 F | BODY MASS INDEX: 29.39 KG/M2 | SYSTOLIC BLOOD PRESSURE: 94 MMHG | OXYGEN SATURATION: 96 %

## 2023-08-24 DIAGNOSIS — Z51.81 ENCOUNTER FOR THERAPEUTIC DRUG LEVEL MONITORING: Primary | ICD-10-CM

## 2023-08-24 DIAGNOSIS — Z95.811 PRESENCE OF HEART ASSIST DEVICE (HCC): ICD-10-CM

## 2023-08-24 DIAGNOSIS — I50.22 CHRONIC SYSTOLIC HEART FAILURE (HCC): ICD-10-CM

## 2023-08-24 LAB
INR BLD: 2
POC INR: 2
PROTHROMBIN TIME, POC: NORMAL

## 2023-08-24 ASSESSMENT — ENCOUNTER SYMPTOMS
SHORTNESS OF BREATH: 0
CHEST TIGHTNESS: 0
EYE PAIN: 0
COUGH: 0
ABDOMINAL PAIN: 0
BLOOD IN STOOL: 0
SORE THROAT: 0
ABDOMINAL DISTENTION: 0

## 2023-08-24 ASSESSMENT — PATIENT HEALTH QUESTIONNAIRE - PHQ9
SUM OF ALL RESPONSES TO PHQ QUESTIONS 1-9: 0
SUM OF ALL RESPONSES TO PHQ QUESTIONS 1-9: 0
SUM OF ALL RESPONSES TO PHQ9 QUESTIONS 1 & 2: 0
1. LITTLE INTEREST OR PLEASURE IN DOING THINGS: 0
2. FEELING DOWN, DEPRESSED OR HOPELESS: 0
SUM OF ALL RESPONSES TO PHQ QUESTIONS 1-9: 0
SUM OF ALL RESPONSES TO PHQ QUESTIONS 1-9: 0

## 2023-08-24 NOTE — PATIENT INSTRUCTIONS
Medication changes:    None today - recheck INR next Wednesday     Testing Ordered:    None today     Other Recommendations:     Continue to change drive line exit site dressing 3 times a week using sterile technique,     Ensure you are drinking an adequate amount of water with a goal of 6-8 eight ounce glasses (1.5-2 liters) of fluid daily. Your urine should be clear and light yellow straw colored. Have family change dressing 09/05 (tuesday)     Follow up 09/08 at 10am with [address] for nurse visit for dressing change. For further patient and caregiver resources as well as access to online LVAD support groups visit [email or web address]       Please bring your daily sheets and medications to your next appointment. Please monitor your weights daily upon waking and after using the bathroom. Keep a written records of your weights and bring to your next appointment. If you have a weight gain of 3 or more pounds overnight OR 5 or more pounds in one week please contact our office. Thank you for allowing us the privilege of being a part of your healthcare team! Please do not hesitate to contact our office at [phone] with any questions or concerns.

## 2023-08-24 NOTE — PROGRESS NOTES
[address] in [address]      LVAD  LVAD Type[de-identified] Left Ventricular Assist Device (LVAD)  Pump Speed (rpm): 4800  Pump Flow (lpm): 3  MAP (mmHg): 96  Set Low Speed (rpm): 4400  Pump Pulse Index (PI): 10.3  Pump Power (Ji): 3.2  Backup Controller Present: Yes  Driveline Dressing: Clean, Dry and Intact  Outpatient: Yes  MAP in Therapeutic Range (Outpatient): No       Yael Hodges Sr was seen today in clinic for a visit with Amelia Chadwick     Patient denies s/s of driveline infection or driveline trauma (including  drops). States pain that he was having previously is much improved. Denies LVAD alarms at home. Driveline inspected for integrity. Above reported to provider. LVAD interrogation completed in clinic, results reported to provider. See flow sheet for details. All orders entered per VORB. All provider instructions placed in AVS and reviewed with patient. Educated the patient on expected follow up. Nurse visits scheduled for dressing changes as patient's wife and other caregiver will be out of the country. reviewed questions. Patient verbalized understanding, denied questions at end of visit.
He is alert and oriented to person, place, and time. Psychiatric:         Mood and Affect: Mood normal.         Behavior: Behavior normal.         Thought Content: Thought content normal.         Judgment: Judgment normal.        REVIEW OF SYSTEMS:  Review of Systems   Constitutional:  Negative for activity change, appetite change, chills, fatigue, fever and unexpected weight change. Does get tired at times   HENT:  Negative for congestion, nosebleeds and sore throat. Eyes:  Negative for pain and visual disturbance. Respiratory:  Negative for cough, chest tightness and shortness of breath. Cardiovascular:  Negative for chest pain, palpitations and leg swelling. Gastrointestinal:  Negative for abdominal distention, abdominal pain and blood in stool. Genitourinary:  Negative for dysuria and hematuria. Musculoskeletal:  Negative for arthralgias and myalgias. Skin:  Negative for rash and wound. Neurological:  Negative for dizziness, weakness, light-headedness and headaches. Psychiatric/Behavioral:  Positive for sleep disturbance. Negative for confusion and dysphoric mood. The patient is not nervous/anxious. PAST MEDICAL HISTORY:  Past Medical History:   Diagnosis Date    CAD (coronary artery disease) '90 '97, '13 x 2    MI, code ice in 2013 at [facility]    Calculus of kidney     Cardiac arrest ([facility]) 2/4/2013    Congestive heart failure, unspecified     Diabetes ([address])     Gastritis     Hypercholesterolemia     Sleep apnea        PAST SURGICAL HISTORY:  Past Surgical History:   Procedure Laterality Date    CARDIAC PROCEDURE N/A 05/17/2023    Right heart cath performed by Milagros Quintanilla MD at [facility]    COLONOSCOPY N/A 3/2/2022    COLONOSCOPY    :- performed by Roxann García MD at [address] [place] [address] 75      11, due 16    COLONOSCOPY  04/06/2011    16, due 21    CORONARY ARTERY BYPASS GRAFT  8/22/12    x 4 vessel by YOSEF Gaytan    CT BONE MARROW BIOPSY  06/08/2021    CT

## 2023-08-29 ENCOUNTER — TELEPHONE (OUTPATIENT)
Age: 78
End: 2023-08-29

## 2023-08-29 NOTE — TELEPHONE ENCOUNTER
Contacted patient via phone for INR reminder. Telephone Call RE:  INR Reminder      Outcome:     [x] Patient verbalizes understanding    [] Unable to reach   [] Left message              [x]     Spoke with patient's wife in reference to INR reminder for tomorrow. Patient agrees to conduct INR and self report.     [place], [place]

## 2023-08-30 ENCOUNTER — ANTI-COAG VISIT (OUTPATIENT)
Age: 78
End: 2023-08-30

## 2023-08-30 LAB — INR BLD: 2

## 2023-09-04 PROBLEM — I50.20 HFREF (HEART FAILURE WITH REDUCED EJECTION FRACTION) (HCC): Status: ACTIVE | Noted: 2022-04-03

## 2023-09-05 ENCOUNTER — TELEPHONE (OUTPATIENT)
Age: 78
End: 2023-09-05

## 2023-09-05 RX ORDER — TAMSULOSIN HYDROCHLORIDE 0.4 MG/1
0.4 CAPSULE ORAL DAILY
Qty: 5 CAPSULE | Refills: 0 | Status: SHIPPED | OUTPATIENT
Start: 2023-09-05

## 2023-09-05 NOTE — TELEPHONE ENCOUNTER
Requested Prescriptions     Signed Prescriptions Disp Refills    sacubitril-valsartan (ENTRESTO) 49-51 MG per tablet 10 tablet 0     Sig: Take 1 tablet by mouth 2 times daily     Authorizing Provider: Dre WYLIE     Ordering User: Bren Bhakta    tamsulosin (FLOMAX) 0.4 MG capsule 5 capsule 0     Sig: Take 1 capsule by mouth daily     Authorizing Provider: Daria Aguilar     Ordering User: Bren Bhakta     Patient's wife called requesting short script as she is going out of town and needs to fill patient's medication box before going.

## 2023-09-05 NOTE — TELEPHONE ENCOUNTER
Contacted patient via phone for lab reminder. Telephone Call RE:  Lab Reminder      Outcome:     [] Patient verbalizes understanding    [] Unable to reach   [] Left message              [] Labs faxed to desired lab.     Patient's wife state that the patient will go to the lab on Thursday      [place], [place]

## 2023-09-07 ENCOUNTER — TELEPHONE (OUTPATIENT)
Age: 78
End: 2023-09-07

## 2023-09-08 ENCOUNTER — NURSE ONLY (OUTPATIENT)
Age: 78
End: 2023-09-08

## 2023-09-08 VITALS — BODY MASS INDEX: 28.86 KG/M2 | WEIGHT: 212.8 LBS

## 2023-09-08 DIAGNOSIS — Z01.89 ROUTINE LAB DRAW: ICD-10-CM

## 2023-09-08 DIAGNOSIS — R06.02 SHORTNESS OF BREATH: ICD-10-CM

## 2023-09-08 DIAGNOSIS — Z51.81 ENCOUNTER FOR MONITORING DIURETIC THERAPY: ICD-10-CM

## 2023-09-08 DIAGNOSIS — Z79.899 ENCOUNTER FOR MONITORING DIURETIC THERAPY: ICD-10-CM

## 2023-09-08 DIAGNOSIS — I50.20 HFREF (HEART FAILURE WITH REDUCED EJECTION FRACTION) (HCC): ICD-10-CM

## 2023-09-08 DIAGNOSIS — Z51.81 ENCOUNTER FOR THERAPEUTIC DRUG LEVEL MONITORING: Primary | ICD-10-CM

## 2023-09-08 DIAGNOSIS — Z95.811 S/P VENTRICULAR ASSIST DEVICE (HCC): ICD-10-CM

## 2023-09-08 DIAGNOSIS — Z79.01 CHRONIC ANTICOAGULATION: ICD-10-CM

## 2023-09-08 NOTE — PROGRESS NOTES
[facility] in [place], [place]    Patient presented to clinic for LVAD Driveline dressing change and routine lab draw. Driveline dressing change completed using sterile technique. Driveline site clean, dry, intact. No redness, swelling, drainage, tenderness or odor. Labs collected. Time given to ask questions. Upcoming lab visit 9/11/23 confirmed with patient. Patient had no further questions or concerns.      Bandar Souza RN  VAD Coordinator

## 2023-09-09 LAB
ALBUMIN SERPL-MCNC: 4.4 G/DL (ref 3.8–4.8)
ALBUMIN/GLOB SERPL: 1.6 {RATIO} (ref 1.2–2.2)
ALP SERPL-CCNC: 129 IU/L (ref 44–121)
ALT SERPL-CCNC: 6 IU/L (ref 0–44)
AST SERPL-CCNC: 39 IU/L (ref 0–40)
BASOPHILS # BLD AUTO: 0 X10E3/UL (ref 0–0.2)
BASOPHILS NFR BLD AUTO: 1 %
BILIRUB SERPL-MCNC: 0.6 MG/DL (ref 0–1.2)
BUN SERPL-MCNC: 20 MG/DL (ref 8–27)
BUN/CREAT SERPL: 18 (ref 10–24)
CALCIUM SERPL-MCNC: 9.4 MG/DL (ref 8.6–10.2)
CHLORIDE SERPL-SCNC: 98 MMOL/L (ref 96–106)
CO2 SERPL-SCNC: 24 MMOL/L (ref 20–29)
CREAT SERPL-MCNC: 1.14 MG/DL (ref 0.76–1.27)
EGFRCR SERPLBLD CKD-EPI 2021: 66 ML/MIN/1.73
EOSINOPHIL # BLD AUTO: 0.1 X10E3/UL (ref 0–0.4)
EOSINOPHIL NFR BLD AUTO: 2 %
ERYTHROCYTE [DISTWIDTH] IN BLOOD BY AUTOMATED COUNT: 14.5 % (ref 11.6–15.4)
GLOBULIN SER CALC-MCNC: 2.7 G/DL (ref 1.5–4.5)
GLUCOSE SERPL-MCNC: 99 MG/DL (ref 70–99)
HCT VFR BLD AUTO: 44.4 % (ref 37.5–51)
HGB BLD-MCNC: 14 G/DL (ref 13–17.7)
IMM GRANULOCYTES # BLD AUTO: 0 X10E3/UL (ref 0–0.1)
IMM GRANULOCYTES NFR BLD AUTO: 0 %
INR PPP: 1.4 (ref 0.9–1.2)
LDH SERPL L TO P-CCNC: 173 IU/L (ref 121–224)
LYMPHOCYTES # BLD AUTO: 0.8 X10E3/UL (ref 0.7–3.1)
LYMPHOCYTES NFR BLD AUTO: 25 %
MAGNESIUM SERPL-MCNC: 2.3 MG/DL (ref 1.6–2.3)
MCH RBC QN AUTO: 25.7 PG (ref 26.6–33)
MCHC RBC AUTO-ENTMCNC: 31.5 G/DL (ref 31.5–35.7)
MCV RBC AUTO: 82 FL (ref 79–97)
MONOCYTES # BLD AUTO: 0.4 X10E3/UL (ref 0.1–0.9)
MONOCYTES NFR BLD AUTO: 12 %
NEUTROPHILS # BLD AUTO: 1.8 X10E3/UL (ref 1.4–7)
NEUTROPHILS NFR BLD AUTO: 60 %
NT-PROBNP SERPL-MCNC: 580 PG/ML (ref 0–486)
PLATELET # BLD AUTO: 198 X10E3/UL (ref 150–450)
POTASSIUM SERPL-SCNC: 4.4 MMOL/L (ref 3.5–5.2)
PROT SERPL-MCNC: 7.1 G/DL (ref 6–8.5)
PROTHROMBIN TIME: 14.6 SEC (ref 9.1–12)
RBC # BLD AUTO: 5.44 X10E6/UL (ref 4.14–5.8)
SODIUM SERPL-SCNC: 138 MMOL/L (ref 134–144)
WBC # BLD AUTO: 3 X10E3/UL (ref 3.4–10.8)

## 2023-09-11 ENCOUNTER — ANTI-COAG VISIT (OUTPATIENT)
Age: 78
End: 2023-09-11
Payer: MEDICARE

## 2023-09-11 ENCOUNTER — NURSE ONLY (OUTPATIENT)
Age: 78
End: 2023-09-11

## 2023-09-11 DIAGNOSIS — Z79.01 CHRONIC ANTICOAGULATION: Primary | ICD-10-CM

## 2023-09-11 DIAGNOSIS — R68.89 SUSPECTED SOFT TISSUE INFECTION: ICD-10-CM

## 2023-09-11 LAB — INR BLD: 1.4

## 2023-09-11 PROCEDURE — 93793 ANTICOAG MGMT PT WARFARIN: CPT | Performed by: NURSE PRACTITIONER

## 2023-09-11 RX ORDER — DOXYCYCLINE HYCLATE 100 MG
100 TABLET ORAL 2 TIMES DAILY
Qty: 20 TABLET | Refills: 0 | Status: SHIPPED | OUTPATIENT
Start: 2023-09-11 | End: 2023-09-21

## 2023-09-11 NOTE — PROGRESS NOTES
[address] in [place], [place]  LVAD Outpatient Nurse Visit    Chief Complaint   Patient presents with    Other     Nurse visit- dressing change      Willie Michaels is a 69 y.o. male with a history of ICM with Heartmate 3 implant date of 04/05/22. Patient was seen in clinic for planned drive line dressing change due to patient's caregiver out of town. Driveline dressing change completed using sterile technique. Patient noted to have a dime size amount of bloody drainage from drive line. Patient denies drive line pull or controller drop. Reviewed all information with Sally Riedel who recommended V.O.R.B 10 day course of doxycycline and wound culture to be sent. All instructions placed in after visit summary and reviewed with patient. Education provided on follow up, new medications and not showering. Time given to ask questions. Patient verbalized understanding and had no further questions.

## 2023-09-11 NOTE — PATIENT INSTRUCTIONS
Medication changes:    START doxycycline 100 mg twice a day for 10 days. You will be contacted with INR instructions later today     Testing Ordered:    Wound culture send today, you will be contacted with any results requiring changes to your plan of care     Other Recommendations:     Do not shower until instructed to do so by [facility]     Continue to change drive line exit site dressing 3 times a week using sterile technique,     Ensure you are drinking an adequate amount of water with a goal of 6-8 eight ounce glasses (1.5-2 liters) of fluid daily. Your urine should be clear and light yellow straw colored. Monthly LVAD Education Tip:    In anticipation of inclement weather, we would like to communicate an emergency plan regarding your HeartMate II / HeartMate 3 LVAD. In preparation for an upcoming storm:  Have all batteries fully charged. Have a cell phone (fully charged) and flashlight available. Know the contact number for a local place that will definitely have power if you lose power in your home (friend with a generator, fire station, shelter, hospital, etc.). In the event of a snow emergency, please identify your snow removal plan. If you must shovel the snow yourself please take frequent breaks and keep controller/batteries dry. In the event of a power outage and you are connected to your power module (HEARTMATE 2 ONLY):  Place yourself onto battery. If the power does not come back in 1-2 hours, your power module battery is at risk. You have two options to preserve it:  Find a power source and plug-in. OR  Remove the internal battery. To remove your internal battery,  follow these steps  Unplug power module, press silence button. Use [place] head screwdriver on the back panel to remove plate. Continue to use screwdriver on metal cage that holds the big black battery in place. Remove big black battery and disconnect from the small clip on the left side.  This will save the big black

## 2023-09-12 NOTE — PROGRESS NOTES
Attempted to call patient x2 however received message call was being screened by call blocker. Spoke with patient's wife Cristino Shannon and provided instructions- she verbalized understanding and stated she would ensure patient received instructions.

## 2023-09-12 NOTE — PROGRESS NOTES
[address] in [place], [place]      INR result reviewed with Holdrege Ceron  who made the following recommendations (Lyssa Urrutia): 10mg Monday night, 7.5 Wednesday and recheck Thursday. Patient's wife notified and verbalized understanding. They had no further questions.  (See anticoag tracker)     Chuy Rogel RN Mr. Iglesias is a 50 year old male who was referred by Dr. Aiken for acute pancreatitis.    He went to the ER in [place] on 7/23/2020 for epigastric abdominal pain. He was told he had acute pancreatitis. He reports he has never had acute pancreatitis before.   He reports not drinking regularly and reports having 1 drink in the prior 3 months.   He reports having a US and CT performed and reports neither showed gallstones but did show acute pancreatitis.   He was discharged on 7/24/2020 after responding well to conservative management.   He reports no family history of acute pancreatitis.  He also reports having dark red blood in stool for approximately 1 year. He reports bleeding is intermittent and occurs for once every 2-3 months.   He reports no family history of colon cancer.

## 2023-09-13 ENCOUNTER — ANTI-COAG VISIT (OUTPATIENT)
Age: 78
End: 2023-09-13

## 2023-09-13 ENCOUNTER — NURSE ONLY (OUTPATIENT)
Age: 78
End: 2023-09-13

## 2023-09-13 DIAGNOSIS — Z79.01 CHRONIC ANTICOAGULATION: Primary | ICD-10-CM

## 2023-09-13 DIAGNOSIS — Z95.811 S/P VENTRICULAR ASSIST DEVICE (HCC): Primary | ICD-10-CM

## 2023-09-13 LAB — INR BLD: 1.8

## 2023-09-13 NOTE — PROGRESS NOTES
[facility] in [place], [place]      INR result reviewed with Shamika Ambrose  who made the following recommendations (Roxane South): increase to 10mg tonight and recheck friday. Patient's wife notified and verbalized understanding. They had no further questions.  (See anticoag tracker)     Andrey Clark RN

## 2023-09-13 NOTE — PROGRESS NOTES
[address] in [place], [place]  LVAD Outpatient Nurse Visit    Chief Complaint   Patient presents with    Other     Nurse visit- dressing change          Edgar Aj is a 69 y.o. male with a history of ICM with Heartmate 3 implant date of 04/05/22. Patient was seen in clinic for planned drive line dressing change due to patient's caregiver out of town. Driveline dressing change completed using sterile technique. Patient noted to have a dime size amount of bloody drainage from drive line decreased from last time. Also noted to have quarter sized area of darkened skin along drive line. Roxanne Mccullough assessed this and felt to be hematoma. No new changes at this time. Area marked and patient educated to call if this increases in size or quality. Reviewed all information with Roxanne Mccullough who recommended V.O.R.B no changes at this time. All instructions placed in after visit summary and reviewed with patient. Education provided on follow up,  not showering. Time given to ask questions.  Patient verbalized understanding and had no further questions

## 2023-09-13 NOTE — PATIENT INSTRUCTIONS
Medication changes:    No changes today     Testing Ordered:    None today- dressing change completed- continue antibiotics     Other Recommendations:     Continue to change drive line exit site dressing 3 times a week using sterile technique,     Ensure you are drinking an adequate amount of water with a goal of 6-8 eight ounce glasses (1.5-2 liters) of fluid daily. Your urine should be clear and light yellow straw colored. Monthly LVAD Education Tip:    In anticipation of inclement weather, we would like to communicate an emergency plan regarding your HeartMate II / HeartMate 3 LVAD. In preparation for an upcoming storm:  Have all batteries fully charged. Have a cell phone (fully charged) and flashlight available. Know the contact number for a local place that will definitely have power if you lose power in your home (friend with a generator, fire station, shelter, hospital, etc.). In the event of a snow emergency, please identify your snow removal plan. If you must shovel the snow yourself please take frequent breaks and keep controller/batteries dry. In the event of a power outage and you are connected to your power module (HEARTMATE 2 ONLY):  Place yourself onto battery. If the power does not come back in 1-2 hours, your power module battery is at risk. You have two options to preserve it:  Find a power source and plug-in. OR  Remove the internal battery. To remove your internal battery,  follow these steps  Unplug power module, press silence button. Use [place] head screwdriver on the back panel to remove plate. Continue to use screwdriver on metal cage that holds the big black battery in place. Remove big black battery and disconnect from the small clip on the left side. This will save the big black battery from losing power. After power is restored, you will reconnect the battery to the small clip inside. You will then screw the battery back in the cage and secure the gray outer plate.

## 2023-09-15 ENCOUNTER — ANTI-COAG VISIT (OUTPATIENT)
Age: 78
End: 2023-09-15

## 2023-09-15 ENCOUNTER — NURSE ONLY (OUTPATIENT)
Age: 78
End: 2023-09-15

## 2023-09-15 DIAGNOSIS — Z79.01 CHRONIC ANTICOAGULATION: Primary | ICD-10-CM

## 2023-09-15 LAB
INR BLD: 2.6
INTERNATIONAL NORMALIZATION RATIO, POC: 2.6

## 2023-09-16 LAB
BACTERIA SPEC AEROBE CULT: NORMAL
BACTERIA SPEC ANAEROBE CULT: NORMAL

## 2023-09-16 NOTE — PROGRESS NOTES
[address] in [address] [place] [address]      INR result reviewed with Jayla Burton  who made the following recommendations (Quoc Ray): no changes and recheck 1 week . Patient notified while at nurse visit and verbalized understanding. They had no further questions.  (See anticoag tracker)     Harpal Bautista RN

## 2023-09-16 NOTE — PATIENT INSTRUCTIONS
Medication changes:    None today      Testing Ordered:    None today     Other Recommendations:     Continue to change drive line exit site dressing 3 times a week using sterile technique,     Ensure you are drinking an adequate amount of water with a goal of 6-8 eight ounce glasses (1.5-2 liters) of fluid daily. Your urine should be clear and light yellow straw colored. Monthly LVAD Education Tip:    In anticipation of inclement weather, we would like to communicate an emergency plan regarding your HeartMate II / HeartMate 3 LVAD. In preparation for an upcoming storm:  Have all batteries fully charged. Have a cell phone (fully charged) and flashlight available. Know the contact number for a local place that will definitely have power if you lose power in your home (friend with a generator, fire station, shelter, hospital, etc.). In the event of a snow emergency, please identify your snow removal plan. If you must shovel the snow yourself please take frequent breaks and keep controller/batteries dry. In the event of a power outage and you are connected to your power module (HEARTMATE 2 ONLY):  Place yourself onto battery. If the power does not come back in 1-2 hours, your power module battery is at risk. You have two options to preserve it:  Find a power source and plug-in. OR  Remove the internal battery. To remove your internal battery,  follow these steps  Unplug power module, press silence button. Use [place] head screwdriver on the back panel to remove plate. Continue to use screwdriver on metal cage that holds the big black battery in place. Remove big black battery and disconnect from the small clip on the left side. This will save the big black battery from losing power. After power is restored, you will reconnect the battery to the small clip inside. You will then screw the battery back in the cage and secure the gray outer plate. Plug power module back into the wall.  You should see a

## 2023-09-16 NOTE — PROGRESS NOTES
[address] in [facility]  LVAD Outpatient Nurse Visit    Chief Complaint   Patient presents with    Other     Nurse visit dressing change      Yuli Wagner is a 69 y.o. male with a history of ICM with Heartmate 3 implant date of 04/05/22. Patient was seen in clinic for planned drive line dressing change due to patient's caregiver out of town. Driveline dressing change completed using sterile technique. Patient noted to have a scant amount of bloody drainage from drive line decreased from last time. Area of darkened skin along driveline is unchanged from last assessment. Reviewed all information with Grace Noel who recommended V.O.R.B no changes at this time. All instructions placed in after visit summary and reviewed with patient. Education provided on follow up,  not showering. Time given to ask questions.  Patient verbalized understanding and had no further questions

## 2023-09-20 ENCOUNTER — TELEPHONE (OUTPATIENT)
Age: 78
End: 2023-09-20

## 2023-09-20 NOTE — TELEPHONE ENCOUNTER
Contacted patient via phone for INR reminder. Telephone Call RE:  INR Reminder      Outcome:     [x] Patient verbalizes understanding    [] Unable to reach   [] Left message              [x]     Spoke with patient's wife in reference to INR reminder for tomorrow. Patient agrees to conduct INR and self report.     Brayden Allred

## 2023-09-21 ENCOUNTER — TELEPHONE (OUTPATIENT)
Age: 78
End: 2023-09-21

## 2023-09-21 ENCOUNTER — ANTI-COAG VISIT (OUTPATIENT)
Age: 78
End: 2023-09-21

## 2023-09-21 DIAGNOSIS — Z79.01 CHRONIC ANTICOAGULATION: Primary | ICD-10-CM

## 2023-09-21 LAB — INR BLD: 1.9

## 2023-09-25 ENCOUNTER — TELEPHONE (OUTPATIENT)
Age: 78
End: 2023-09-25

## 2023-09-25 NOTE — TELEPHONE ENCOUNTER
Received call from patient's wife noting skin breakdown around patient's drive line. States that this area looks like a \"blister\" that has popped. Denies any drainage from driveline site. States she has been placing a small piece of gauze over skin irritation under dressing. Educated patient's wife to continue using skin protectant, and reassess site in 1-2 days. Call [facility] if no improvement in site after application of gauze, She verbalized understanding. Dr. Lolly Helm updated.

## 2023-09-25 NOTE — TELEPHONE ENCOUNTER
Contacted patient via phone for lab reminder. Telephone Call RE:  Lab Reminder      Outcome:     [x] Patient verbalizes understanding    [] Unable to reach   [] Left message              [] Labs faxed to desired lab.       Darylene Given, MA

## 2023-09-26 ENCOUNTER — ANTI-COAG VISIT (OUTPATIENT)
Age: 78
End: 2023-09-26
Payer: MEDICARE

## 2023-09-26 DIAGNOSIS — Z79.01 CHRONIC ANTICOAGULATION: Primary | ICD-10-CM

## 2023-09-26 LAB — INR BLD: 2.7

## 2023-09-26 PROCEDURE — 93793 ANTICOAG MGMT PT WARFARIN: CPT | Performed by: NURSE PRACTITIONER

## 2023-09-26 NOTE — PROGRESS NOTES
[address] [facility] in [place], [place]      INR result reviewed with Grace Noel NP who made the following recommendations (Ermelinda Metzger): no changes and recheck 1 week. Patient notified and verbalized understanding. They had no further questions.  (See anticoag tracker)     Melissa Beltrán RN

## 2023-09-27 RX ORDER — POTASSIUM CHLORIDE 750 MG/1
40 TABLET, EXTENDED RELEASE ORAL DAILY
Qty: 120 TABLET | Refills: 2 | OUTPATIENT
Start: 2023-09-27

## 2023-09-27 NOTE — TELEPHONE ENCOUNTER
Requested Prescriptions     Refused Prescriptions Disp Refills    potassium chloride (KLOR-CON M) 10 MEQ extended release tablet [Pharmacy Med Name: POTASSIUM CL MICRO 10MEQ ER TABS] 120 tablet 2     Sig: TAKE 4 TABLETS BY MOUTH DAILY     Refused By: Stephanie Ballard     Reason for Refusal: Other

## 2023-09-28 ENCOUNTER — TELEPHONE (OUTPATIENT)
Age: 78
End: 2023-09-28

## 2023-09-28 NOTE — TELEPHONE ENCOUNTER
Patient's wife called stating that her and patient plan to go to [place] this weekend. Asking if they need to notify [facility] and/or closest center. Stated that they are only going for the day, not staying over. Educated patient's wife that closest VAD centers generally do not need to be notified if patient not staying in area overnight.

## 2023-10-02 ENCOUNTER — TELEPHONE (OUTPATIENT)
Age: 78
End: 2023-10-02

## 2023-10-02 NOTE — TELEPHONE ENCOUNTER
Contacted patient via phone for INR reminder. Telephone Call RE:  INR Reminder      Outcome:     [x] Patient verbalizes understanding    [] Unable to reach   [] Left message              [x]     Spoke with patient's wife in reference to INR reminder for tomorrow. Patient agrees to conduct INR and self report.     Zeb Bruno, [facility]

## 2023-10-04 ENCOUNTER — TELEPHONE (OUTPATIENT)
Age: 78
End: 2023-10-04

## 2023-10-04 ENCOUNTER — ANTI-COAG VISIT (OUTPATIENT)
Age: 78
End: 2023-10-04
Payer: MEDICARE

## 2023-10-04 DIAGNOSIS — R68.89 SUSPECTED SOFT TISSUE INFECTION: ICD-10-CM

## 2023-10-04 DIAGNOSIS — I50.20 HFREF (HEART FAILURE WITH REDUCED EJECTION FRACTION) (HCC): ICD-10-CM

## 2023-10-04 DIAGNOSIS — Z51.81 ENCOUNTER FOR THERAPEUTIC DRUG LEVEL MONITORING: ICD-10-CM

## 2023-10-04 DIAGNOSIS — Z79.899 ENCOUNTER FOR MONITORING DIURETIC THERAPY: ICD-10-CM

## 2023-10-04 DIAGNOSIS — Z51.81 ENCOUNTER FOR MONITORING DIURETIC THERAPY: ICD-10-CM

## 2023-10-04 DIAGNOSIS — Z79.01 CHRONIC ANTICOAGULATION: Primary | ICD-10-CM

## 2023-10-04 DIAGNOSIS — R06.02 SHORTNESS OF BREATH: ICD-10-CM

## 2023-10-04 LAB — INR BLD: 1.9

## 2023-10-04 PROCEDURE — 93793 ANTICOAG MGMT PT WARFARIN: CPT | Performed by: NURSE PRACTITIONER

## 2023-10-04 RX ORDER — AZITHROMYCIN 500 MG/1
500 TABLET, FILM COATED ORAL ONCE
Qty: 1 TABLET | Refills: 0 | Status: SHIPPED | OUTPATIENT
Start: 2023-10-04 | End: 2023-10-04

## 2023-10-04 NOTE — PROGRESS NOTES
[address] [facility] in [place], [place]      INR result reviewed with Jojo Verduzco NP who made the following recommendations (Norman Saldivar): 7.5mg tonight and recheck 1 week. Patient notified and verbalized understanding. They had no further questions.  (See anticoag tracker)     Elma Libman, RN

## 2023-10-04 NOTE — TELEPHONE ENCOUNTER
Reviewed with Tio Lawler who ordered VORB 500mg Azythromycin PO once 1 hour prior to dental work. Spoke with patient's wife and educated on new prescription, she verbalized understanding.

## 2023-10-10 ENCOUNTER — ANTI-COAG VISIT (OUTPATIENT)
Age: 78
End: 2023-10-10
Payer: MEDICARE

## 2023-10-10 DIAGNOSIS — Z79.01 CHRONIC ANTICOAGULATION: Primary | ICD-10-CM

## 2023-10-10 LAB — INR BLD: 2.4

## 2023-10-10 PROCEDURE — 93793 ANTICOAG MGMT PT WARFARIN: CPT | Performed by: NURSE PRACTITIONER

## 2023-10-10 NOTE — PROGRESS NOTES
[address] in [place], [place]      INR result reviewed with Morenita Garcia NP who made the following recommendations (Alissa Taylor): no changes and recheck 1 week. Patient notified and verbalized understanding. They had no further questions. (See anticoag tracker)     Patients wife noted area of skin breakdown along driveline, outside of dressing. Requested someone to look at wound.  Patient confirmed can come for nurse visit tomorrow at [facility], RN

## 2023-10-11 ENCOUNTER — NURSE ONLY (OUTPATIENT)
Age: 78
End: 2023-10-11

## 2023-10-11 DIAGNOSIS — Z79.899 ENCOUNTER FOR MONITORING DIURETIC THERAPY: ICD-10-CM

## 2023-10-11 DIAGNOSIS — R68.89 SUSPECTED SOFT TISSUE INFECTION: ICD-10-CM

## 2023-10-11 DIAGNOSIS — Z51.81 ENCOUNTER FOR MONITORING DIURETIC THERAPY: ICD-10-CM

## 2023-10-11 DIAGNOSIS — I50.20 HFREF (HEART FAILURE WITH REDUCED EJECTION FRACTION) (HCC): ICD-10-CM

## 2023-10-11 DIAGNOSIS — Z79.01 CHRONIC ANTICOAGULATION: ICD-10-CM

## 2023-10-11 DIAGNOSIS — R06.02 SHORTNESS OF BREATH: ICD-10-CM

## 2023-10-11 RX ORDER — DOXYCYCLINE HYCLATE 100 MG
100 TABLET ORAL 2 TIMES DAILY
Qty: 20 TABLET | Refills: 0 | Status: SHIPPED | OUTPATIENT
Start: 2023-10-11 | End: 2023-10-21

## 2023-10-11 NOTE — PROGRESS NOTES
[address] in [place], [place]  LVAD Outpatient Nurse Visit    Chief Complaint   Patient presents with    Other     Nurse visit- Drive line check          LVAD  Driveline Dressing: Changed per order     Ericka Russell is a 69 y.o. male with a history of ICM with Heartmate 3 implant date of 04/05/22. Patient was seen in clinic for drive line/wound check due to patient's wife noted wound on patient's abdomen. Patient with approximately 1cm x1cm area of open pink and yellow wound over the drive line about 3 inches medial to the drive line dressing. He states this previously fluid filled and \"popped\" about a week ago. This is the same area where patient previously had darkening of the skin. The darkening of the skin looks improved from several weeks ago. Patient stated are is mildly tender. Kailee Jurado assessed wound. Drive line dressing change completed using sterile technique. Scant brown drainage from drive line. Reviewed all information with Kailee Jurado who recommended V.O.R.B 10 day course of doxycycline. All instructions placed in after visit summary and reviewed with patient. Education provided on contacting [facility] for increased drainage, tenderness, fever or other s/s of infection. Time given to ask questions. Patient verbalized understanding and had no further questions.

## 2023-10-11 NOTE — PATIENT INSTRUCTIONS
Medication changes:    START doxycycline twice a day for 10 days       Testing Ordered:    None today- drive line dressing change completed in clinic. 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 Your urine should be clear and light yellow straw colored. Monthly LVAD Education Tip:    [employer]                Follow up as previously scheduled with [address]      For further patient and caregiver resources as well as access to online LVAD support groups visit [email or web address]       Please bring your daily sheets and medications to your next appointment. Please monitor your weights daily upon waking and after using the bathroom. Keep a written records of your weights and bring to your next appointment. If you have a weight gain of 3 or more pounds overnight OR 5 or more pounds in one week please contact our office. Thank you for allowing us the privilege of being a part of your healthcare team! Please do not hesitate to contact our office at [phone] with any questions or concerns.

## 2023-10-12 LAB
ALBUMIN SERPL-MCNC: 4.1 G/DL (ref 3.8–4.8)
ALBUMIN/GLOB SERPL: 1.5 {RATIO} (ref 1.2–2.2)
ALP SERPL-CCNC: 120 IU/L (ref 44–121)
ALT SERPL-CCNC: 6 IU/L (ref 0–44)
AST SERPL-CCNC: 35 IU/L (ref 0–40)
BILIRUB SERPL-MCNC: 0.4 MG/DL (ref 0–1.2)
BUN SERPL-MCNC: 17 MG/DL (ref 8–27)
BUN/CREAT SERPL: 14 (ref 10–24)
CALCIUM SERPL-MCNC: 9.4 MG/DL (ref 8.6–10.2)
CHLORIDE SERPL-SCNC: 101 MMOL/L (ref 96–106)
CO2 SERPL-SCNC: 24 MMOL/L (ref 20–29)
CREAT SERPL-MCNC: 1.2 MG/DL (ref 0.76–1.27)
EGFRCR SERPLBLD CKD-EPI 2021: 62 ML/MIN/1.73
ERYTHROCYTE [DISTWIDTH] IN BLOOD BY AUTOMATED COUNT: 14.6 % (ref 11.6–15.4)
GLOBULIN SER CALC-MCNC: 2.7 G/DL (ref 1.5–4.5)
GLUCOSE SERPL-MCNC: 134 MG/DL (ref 70–99)
HCT VFR BLD AUTO: 41.8 % (ref 37.5–51)
HGB BLD-MCNC: 13.6 G/DL (ref 13–17.7)
INR PPP: 2 (ref 0.9–1.2)
LDH SERPL L TO P-CCNC: 172 IU/L (ref 121–224)
MAGNESIUM SERPL-MCNC: 2.2 MG/DL (ref 1.6–2.3)
MCH RBC QN AUTO: 25.9 PG (ref 26.6–33)
MCHC RBC AUTO-ENTMCNC: 32.5 G/DL (ref 31.5–35.7)
MCV RBC AUTO: 80 FL (ref 79–97)
NT-PROBNP SERPL-MCNC: 666 PG/ML (ref 0–486)
PLATELET # BLD AUTO: 208 X10E3/UL (ref 150–450)
POTASSIUM SERPL-SCNC: 4.7 MMOL/L (ref 3.5–5.2)
PROT SERPL-MCNC: 6.8 G/DL (ref 6–8.5)
PROTHROMBIN TIME: 20.3 SEC (ref 9.1–12)
RBC # BLD AUTO: 5.26 X10E6/UL (ref 4.14–5.8)
SODIUM SERPL-SCNC: 139 MMOL/L (ref 134–144)
WBC # BLD AUTO: 3.5 X10E3/UL (ref 3.4–10.8)

## 2023-10-16 ENCOUNTER — TELEPHONE (OUTPATIENT)
Age: 78
End: 2023-10-16

## 2023-10-16 NOTE — TELEPHONE ENCOUNTER
Contacted patient via phone for INR reminder.     Telephone Call RE:  INR Reminder      Outcome:     [] Patient verbalizes understanding    [x] Unable to reach   [x] Left message              []     Left a message for patient in reference to INR reminder for tomorrow    Christel Batters, [place]

## 2023-10-17 ENCOUNTER — TELEPHONE (OUTPATIENT)
Age: 78
End: 2023-10-17

## 2023-10-17 NOTE — TELEPHONE ENCOUNTER
Attempted to contact patient's wife to request rescheduling of upcoming appointment on 11/21/23 to 11/16/23 at 1100 with Dr. Levar Harper. Message left. Will await return call.  Zeinab Bowser RN

## 2023-10-18 ENCOUNTER — TELEPHONE (OUTPATIENT)
Age: 78
End: 2023-10-18

## 2023-10-18 NOTE — TELEPHONE ENCOUNTER
Patient's wife returned call stating abdominal wound is still present. Describes small drainage, does not feel wound is worse than previously. States it is about the same. Reviewed with Cami Coats who stated VORB have patient finish antibiotics, return to clinic next week for follow up wound check.       Educated Patient's wife on provider instructions, confirmed patient can come for nurse visit 10/25 @ 11am.

## 2023-10-18 NOTE — TELEPHONE ENCOUNTER
Received call from patient's wife stating okay to change hoda's November appointment to 11/16 @ 11 am.     Inquired about patient's abdominal wound, patient's wife states that she will change dressing today and return call to [facility] for update on status of spot.

## 2023-10-18 NOTE — TELEPHONE ENCOUNTER
Return call received from patient's wife confirming rescheduling of appointment. She had no further questions.  Jenelle Rooney RN

## 2023-10-23 ENCOUNTER — TELEPHONE (OUTPATIENT)
Age: 78
End: 2023-10-23

## 2023-10-23 NOTE — TELEPHONE ENCOUNTER
Patient's wife called stating she only has 10mg pills of hydralazine, patient was previously getting 1.5 tablets of 25mg pills. Patient's wife states that she isn't sure if this is an old bottle of pills. Request sent to [place] coordinator for new refill of 25mg tablets (patient received meds through the [place]). Requested patient's wife check patient's INR and send in today- she verbalized understanding. Received return secure email from [place] coordinator who confirmed she will request new shipment of hydralazine sent to patient. Spoke with patient's wife at appointment 10/25 who confirmed she will expect new bottle of medication sent and will confirm dose.

## 2023-10-25 ENCOUNTER — NURSE ONLY (OUTPATIENT)
Age: 78
End: 2023-10-25

## 2023-10-25 ENCOUNTER — ANTI-COAG VISIT (OUTPATIENT)
Age: 78
End: 2023-10-25
Payer: MEDICARE

## 2023-10-25 DIAGNOSIS — R06.02 SHORTNESS OF BREATH: ICD-10-CM

## 2023-10-25 DIAGNOSIS — Z79.01 CHRONIC ANTICOAGULATION: Primary | ICD-10-CM

## 2023-10-25 DIAGNOSIS — Z51.81 ENCOUNTER FOR MONITORING DIURETIC THERAPY: ICD-10-CM

## 2023-10-25 DIAGNOSIS — Z79.899 ENCOUNTER FOR MONITORING DIURETIC THERAPY: ICD-10-CM

## 2023-10-25 DIAGNOSIS — Z95.811 S/P VENTRICULAR ASSIST DEVICE (HCC): ICD-10-CM

## 2023-10-25 DIAGNOSIS — I50.22 CHRONIC SYSTOLIC HEART FAILURE (HCC): ICD-10-CM

## 2023-10-25 LAB — INR BLD: 2.1

## 2023-10-25 PROCEDURE — 93793 ANTICOAG MGMT PT WARFARIN: CPT | Performed by: NURSE PRACTITIONER

## 2023-10-25 NOTE — PROGRESS NOTES
[address] in [place], [place]  LVAD Outpatient Nurse Visit    Chief Complaint   Patient presents with    Other     Nurse visit driveline check            LVAD  Backup Controller Present: Yes  Driveline Dressing: Changed per order        Giancarlo Damon is a 69 y.o. male with a history of ICM with Heartmate 3 implant date of 04/05/22. Patient was seen in clinic for wound check. Driveline dressing change completed using sterile technique. Driveline site clean dry and intact. No drainage from drive line. Wound over drive line site improved from last assessment. Serous drainage with small scab present. Reviewed all information with Jb Lubin who recommended V.O.R.B no changes at this time, patient to continue cleaning site with CHG during dressing changes. All instructions placed in after visit summary and reviewed with patient. Educated patient and patient's wife to continue cleaning site with CHG, call [facility] for continued drainage, lack of improvement in wound site. Time given to ask questions. Patient verbalized understanding and had no further questions.

## 2023-10-25 NOTE — PROGRESS NOTES
[address] in [place], [place]      INR result reviewed with Jb Lubin NP who made the following recommendations (Rony Berry): no change and recheck 2 weeks. Patient's wife notified and verbalized understanding. They had no further questions.  (See anticoag tracker)     Tej Strong RN

## 2023-10-25 NOTE — PATIENT INSTRUCTIONS
Medication changes:    None at this time       Testing Ordered:    None at this time    Other Recommendations:     Continue to notify [facility] if for new or worsening wounds or driveline drainage     Continue to change drive line exit site dressing 3 times a week using sterile technique,     Ensure you are drinking an adequate amount of water with a goal of 6-8 eight ounce glasses (1.5-2 liters) of fluid daily. Your urine should be clear and light yellow straw colored. Monthly LVAD Education Tip:    [employer]                        Follow up in as previously scheduled with [address]      For further patient and caregiver resources as well as access to online LVAD support groups visit [email or web address]       Please bring your daily sheets and medications to your next appointment. Please monitor your weights daily upon waking and after using the bathroom. Keep a written records of your weights and bring to your next appointment. If you have a weight gain of 3 or more pounds overnight OR 5 or more pounds in one week please contact our office. Thank you for allowing us the privilege of being a part of your healthcare team! Please do not hesitate to contact our office at [phone] with any questions or concerns.

## 2023-11-06 ENCOUNTER — TELEPHONE (OUTPATIENT)
Age: 78
End: 2023-11-06

## 2023-11-06 NOTE — TELEPHONE ENCOUNTER
Contacted patient via phone for lab reminder. Telephone Call RE:  Lab Reminder      Outcome:     [x] Patient verbalizes understanding    [] Unable to reach   [] Left message              [x] Labs faxed to desired lab.       Brayden Jose

## 2023-11-07 ENCOUNTER — OFFICE VISIT (OUTPATIENT)
Age: 78
End: 2023-11-07
Payer: MEDICARE

## 2023-11-07 ENCOUNTER — TELEPHONE (OUTPATIENT)
Age: 78
End: 2023-11-07

## 2023-11-07 VITALS
SYSTOLIC BLOOD PRESSURE: 99 MMHG | WEIGHT: 217.8 LBS | HEIGHT: 72 IN | DIASTOLIC BLOOD PRESSURE: 74 MMHG | OXYGEN SATURATION: 98 % | RESPIRATION RATE: 20 BRPM | BODY MASS INDEX: 29.5 KG/M2 | TEMPERATURE: 98.4 F | HEART RATE: 83 BPM

## 2023-11-07 DIAGNOSIS — Z12.5 PROSTATE CANCER SCREENING: ICD-10-CM

## 2023-11-07 DIAGNOSIS — M54.50 MYOFASCIAL LOW BACK PAIN: ICD-10-CM

## 2023-11-07 DIAGNOSIS — E11.51 TYPE 2 DIABETES, CONTROLLED, WITH PERIPHERAL CIRCULATORY DISORDER (HCC): ICD-10-CM

## 2023-11-07 DIAGNOSIS — Z00.00 MEDICARE ANNUAL WELLNESS VISIT, SUBSEQUENT: Primary | ICD-10-CM

## 2023-11-07 PROCEDURE — G8484 FLU IMMUNIZE NO ADMIN: HCPCS | Performed by: STUDENT IN AN ORGANIZED HEALTH CARE EDUCATION/TRAINING PROGRAM

## 2023-11-07 PROCEDURE — 3044F HG A1C LEVEL LT 7.0%: CPT | Performed by: STUDENT IN AN ORGANIZED HEALTH CARE EDUCATION/TRAINING PROGRAM

## 2023-11-07 PROCEDURE — 1123F ACP DISCUSS/DSCN MKR DOCD: CPT | Performed by: STUDENT IN AN ORGANIZED HEALTH CARE EDUCATION/TRAINING PROGRAM

## 2023-11-07 PROCEDURE — G0439 PPPS, SUBSEQ VISIT: HCPCS | Performed by: STUDENT IN AN ORGANIZED HEALTH CARE EDUCATION/TRAINING PROGRAM

## 2023-11-07 RX ORDER — TRAZODONE HYDROCHLORIDE 50 MG/1
25 TABLET ORAL NIGHTLY
COMMUNITY
Start: 2023-10-31

## 2023-11-07 SDOH — ECONOMIC STABILITY: HOUSING INSECURITY
IN THE LAST 12 MONTHS, WAS THERE A TIME WHEN YOU DID NOT HAVE A STEADY PLACE TO SLEEP OR SLEPT IN A SHELTER (INCLUDING NOW)?: NO

## 2023-11-07 SDOH — ECONOMIC STABILITY: INCOME INSECURITY: HOW HARD IS IT FOR YOU TO PAY FOR THE VERY BASICS LIKE FOOD, HOUSING, MEDICAL CARE, AND HEATING?: NOT VERY HARD

## 2023-11-07 SDOH — ECONOMIC STABILITY: FOOD INSECURITY: WITHIN THE PAST 12 MONTHS, YOU WORRIED THAT YOUR FOOD WOULD RUN OUT BEFORE YOU GOT MONEY TO BUY MORE.: NEVER TRUE

## 2023-11-07 SDOH — ECONOMIC STABILITY: FOOD INSECURITY: WITHIN THE PAST 12 MONTHS, THE FOOD YOU BOUGHT JUST DIDN'T LAST AND YOU DIDN'T HAVE MONEY TO GET MORE.: NEVER TRUE

## 2023-11-07 ASSESSMENT — PATIENT HEALTH QUESTIONNAIRE - PHQ9
3. TROUBLE FALLING OR STAYING ASLEEP: 0
8. MOVING OR SPEAKING SO SLOWLY THAT OTHER PEOPLE COULD HAVE NOTICED. OR THE OPPOSITE, BEING SO FIGETY OR RESTLESS THAT YOU HAVE BEEN MOVING AROUND A LOT MORE THAN USUAL: 0
SUM OF ALL RESPONSES TO PHQ QUESTIONS 1-9: 0
4. FEELING TIRED OR HAVING LITTLE ENERGY: 0
10. IF YOU CHECKED OFF ANY PROBLEMS, HOW DIFFICULT HAVE THESE PROBLEMS MADE IT FOR YOU TO DO YOUR WORK, TAKE CARE OF THINGS AT HOME, OR GET ALONG WITH OTHER PEOPLE: 0
2. FEELING DOWN, DEPRESSED OR HOPELESS: 0
SUM OF ALL RESPONSES TO PHQ9 QUESTIONS 1 & 2: 0
5. POOR APPETITE OR OVEREATING: 0
1. LITTLE INTEREST OR PLEASURE IN DOING THINGS: 0
SUM OF ALL RESPONSES TO PHQ QUESTIONS 1-9: 0
SUM OF ALL RESPONSES TO PHQ QUESTIONS 1-9: 0
9. THOUGHTS THAT YOU WOULD BE BETTER OFF DEAD, OR OF HURTING YOURSELF: 0
7. TROUBLE CONCENTRATING ON THINGS, SUCH AS READING THE NEWSPAPER OR WATCHING TELEVISION: 0
6. FEELING BAD ABOUT YOURSELF - OR THAT YOU ARE A FAILURE OR HAVE LET YOURSELF OR YOUR FAMILY DOWN: 0
SUM OF ALL RESPONSES TO PHQ QUESTIONS 1-9: 0

## 2023-11-07 ASSESSMENT — LIFESTYLE VARIABLES
HOW OFTEN DO YOU HAVE A DRINK CONTAINING ALCOHOL: NEVER
HOW MANY STANDARD DRINKS CONTAINING ALCOHOL DO YOU HAVE ON A TYPICAL DAY: PATIENT DOES NOT DRINK

## 2023-11-07 NOTE — PROGRESS NOTES
Medicare Annual Wellness Visit    Goyo Galvan is here for Ana Cristina BROUSSARD    Assessment & Plan   Medicare annual wellness visit, subsequent  Type 2 diabetes, controlled, with peripheral circulatory disorder ([address])  Cont metformin and jardiance. Update A1c.   -     Hemoglobin A1C; Future  Myofascial low back pain  Recommend yoga-based stretching exercises, provided an handout on this  Prostate cancer screening  Reviewed note from urology. Will trend lab since it has been about 6 mo since his last check. If PSA is not rising then we will consider the issues stable and defer further screening PSA. Per urology Dr Gilma Strickland at [facility], he is a poor candidate for interventions because of LVAD. -     PSA Screening; Future    He will continue to see his cardiology teams for HF, LVAD. He will continue to see Dr Ravinder Vasquez for MGUS and lung cancer screening - I have not received any recent progress notes      Recommendations for Preventive Services Due: see orders and patient instructions/AVS.  Recommended screening schedule for the next 5-10 years is provided to the patient in written form: see Patient Instructions/AVS.     Return in 6 months (on 5/7/2024) for follow up. Subjective   The following acute and/or chronic problems were also addressed today:    Here today with wife. Back has been hurting him for the last week or so. 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 Band-like along the low back. DM - metformin, jardiance 25mg. He doesn't check BG often. Has been 100-110 when he checks  Elevated PSA - Uro Dr Gilma Strickland at [facility] - last seen 6/2023, plan to check PSA in 6 mo  HF on LVAD - HF team at [address], [place]. Also sees [facility]ology  MGUS, hx Lung Ca - Dr Ravinder Vasquez at [facility]  SHERI - sleep at [address] and screening values have been reviewed and are found in [address]  The following problems were reviewed today and where indicated follow up

## 2023-11-07 NOTE — PATIENT INSTRUCTIONS
Personalized Preventive Plan for Isabella Lemus Sr - 11/7/2023  Medicare offers a range of preventive health benefits. Some of the tests and screenings are paid in full while other may be subject to a deductible, co-insurance, and/or copay. Some of these benefits include a comprehensive review of your medical history including lifestyle, illnesses that may run in your family, and various assessments and screenings as appropriate. After reviewing your medical record and screening and assessments performed today your provider may have ordered immunizations, labs, imaging, and/or referrals for you. A list of these orders (if applicable) as well as your Preventive Care list are included within your After Visit Summary for your review. Other Preventive Recommendations:    A preventive eye exam performed by an eye specialist is recommended every 1-2 years to screen for glaucoma; cataracts, macular degeneration, and other eye disorders. A preventive dental visit is recommended every 6 months. Try to get at least 150 minutes of exercise per week or 10,000 steps per day on a pedometer . Order or download the FREE \"Exercise & Physical Activity: Your Everyday Guide\" from [employer]. Call 1-138.164.6240 or search [employer] online. You need 4976-1758 mg of calcium and 3382-1295 IU of vitamin D per day. It is possible to meet your calcium requirement with diet alone, but a vitamin D supplement is usually necessary to meet this goal.  When exposed to the sun, use a sunscreen that protects against both UVA and UVB radiation with an SPF of 30 or greater. Reapply every 2 to 3 hours or after sweating, drying off with a towel, or swimming. Always wear a seat belt when traveling in a car. Always wear a helmet when riding a bicycle or motorcycle.

## 2023-11-08 ENCOUNTER — ANTI-COAG VISIT (OUTPATIENT)
Age: 78
End: 2023-11-08
Payer: MEDICARE

## 2023-11-08 DIAGNOSIS — R06.02 SHORTNESS OF BREATH: ICD-10-CM

## 2023-11-08 DIAGNOSIS — Z79.01 CHRONIC ANTICOAGULATION: ICD-10-CM

## 2023-11-08 DIAGNOSIS — Z51.81 ENCOUNTER FOR MONITORING DIURETIC THERAPY: ICD-10-CM

## 2023-11-08 DIAGNOSIS — Z79.899 ENCOUNTER FOR MONITORING DIURETIC THERAPY: ICD-10-CM

## 2023-11-08 DIAGNOSIS — I50.22 CHRONIC SYSTOLIC HEART FAILURE (HCC): ICD-10-CM

## 2023-11-08 DIAGNOSIS — Z95.811 S/P VENTRICULAR ASSIST DEVICE (HCC): ICD-10-CM

## 2023-11-08 DIAGNOSIS — Z79.01 CHRONIC ANTICOAGULATION: Primary | ICD-10-CM

## 2023-11-08 LAB
ALBUMIN SERPL-MCNC: 4 G/DL (ref 3.8–4.8)
ALBUMIN/GLOB SERPL: 1.6 {RATIO} (ref 1.2–2.2)
ALP SERPL-CCNC: 120 IU/L (ref 44–121)
ALT SERPL-CCNC: 7 IU/L (ref 0–44)
AST SERPL-CCNC: 32 IU/L (ref 0–40)
BASOPHILS # BLD AUTO: 0 X10E3/UL (ref 0–0.2)
BASOPHILS NFR BLD AUTO: 1 %
BILIRUB SERPL-MCNC: 0.4 MG/DL (ref 0–1.2)
BUN SERPL-MCNC: 18 MG/DL (ref 8–27)
BUN/CREAT SERPL: 16 (ref 10–24)
CALCIUM SERPL-MCNC: 9 MG/DL (ref 8.6–10.2)
CHLORIDE SERPL-SCNC: 102 MMOL/L (ref 96–106)
CO2 SERPL-SCNC: 24 MMOL/L (ref 20–29)
CREAT SERPL-MCNC: 1.14 MG/DL (ref 0.76–1.27)
EGFRCR SERPLBLD CKD-EPI 2021: 66 ML/MIN/1.73
EOSINOPHIL # BLD AUTO: 0.1 X10E3/UL (ref 0–0.4)
EOSINOPHIL NFR BLD AUTO: 2 %
ERYTHROCYTE [DISTWIDTH] IN BLOOD BY AUTOMATED COUNT: 14.5 % (ref 11.6–15.4)
GLOBULIN SER CALC-MCNC: 2.5 G/DL (ref 1.5–4.5)
GLUCOSE SERPL-MCNC: 173 MG/DL (ref 70–99)
HBA1C MFR BLD: 6.5 % (ref 4.8–5.6)
HCT VFR BLD AUTO: 42.3 % (ref 37.5–51)
HGB BLD-MCNC: 13.2 G/DL (ref 13–17.7)
IMM GRANULOCYTES # BLD AUTO: 0 X10E3/UL (ref 0–0.1)
IMM GRANULOCYTES NFR BLD AUTO: 0 %
INR PPP: 1.6 (ref 0.9–1.2)
LDH SERPL L TO P-CCNC: 162 IU/L (ref 121–224)
LYMPHOCYTES # BLD AUTO: 0.7 X10E3/UL (ref 0.7–3.1)
LYMPHOCYTES NFR BLD AUTO: 23 %
MAGNESIUM SERPL-MCNC: 2.1 MG/DL (ref 1.6–2.3)
MCH RBC QN AUTO: 25.3 PG (ref 26.6–33)
MCHC RBC AUTO-ENTMCNC: 31.2 G/DL (ref 31.5–35.7)
MCV RBC AUTO: 81 FL (ref 79–97)
MONOCYTES # BLD AUTO: 0.3 X10E3/UL (ref 0.1–0.9)
MONOCYTES NFR BLD AUTO: 11 %
NEUTROPHILS # BLD AUTO: 2 X10E3/UL (ref 1.4–7)
NEUTROPHILS NFR BLD AUTO: 63 %
NT-PROBNP SERPL-MCNC: 719 PG/ML (ref 0–486)
PLATELET # BLD AUTO: 227 X10E3/UL (ref 150–450)
POTASSIUM SERPL-SCNC: 4.4 MMOL/L (ref 3.5–5.2)
PROT SERPL-MCNC: 6.5 G/DL (ref 6–8.5)
PROTHROMBIN TIME: 17 SEC (ref 9.1–12)
PSA SERPL-MCNC: 3.4 NG/ML (ref 0–4)
RBC # BLD AUTO: 5.21 X10E6/UL (ref 4.14–5.8)
SODIUM SERPL-SCNC: 140 MMOL/L (ref 134–144)
WBC # BLD AUTO: 3.1 X10E3/UL (ref 3.4–10.8)

## 2023-11-08 PROCEDURE — 93793 ANTICOAG MGMT PT WARFARIN: CPT | Performed by: NURSE PRACTITIONER

## 2023-11-08 NOTE — TELEPHONE ENCOUNTER
Patient's wife called stating patient got labwork done at the [place] last week and wondering if patient should get labwork done for [facility]. Returned call to patient's wife and who stated patient already went to [address] [place] [address] and had labwork completed.

## 2023-11-08 NOTE — PROGRESS NOTES
[address] [facility] in [place], [place]      INR result reviewed with Nurys Bishop NP who made the following recommendations (Shikha Code): 7.5mg x3 days and recheck 1 week. Patient's wife notified and verbalized understanding. They had no further questions.  (See anticoag tracker)     Basim Elizalde RN

## 2023-11-09 ENCOUNTER — TELEPHONE (OUTPATIENT)
Age: 78
End: 2023-11-09

## 2023-11-09 NOTE — TELEPHONE ENCOUNTER
Telephone Call RE:  Appointment reminder     Outcome:     [x] Patient confirmed appointment   [] Patient rescheduled appointment for    [] Unable to reach  [] Left message              [] Other:       Pt will confirm with wife too and call if can't do it for some reason.

## 2023-11-16 ENCOUNTER — OFFICE VISIT (OUTPATIENT)
Age: 78
End: 2023-11-16

## 2023-11-16 VITALS
SYSTOLIC BLOOD PRESSURE: 86 MMHG | BODY MASS INDEX: 28.85 KG/M2 | RESPIRATION RATE: 20 BRPM | HEART RATE: 59 BPM | HEIGHT: 72 IN | OXYGEN SATURATION: 92 % | WEIGHT: 213 LBS

## 2023-11-16 DIAGNOSIS — I50.22 CHRONIC SYSTOLIC HEART FAILURE (HCC): ICD-10-CM

## 2023-11-16 DIAGNOSIS — Z95.811 S/P VENTRICULAR ASSIST DEVICE (HCC): Primary | ICD-10-CM

## 2023-11-16 LAB
DISTANCE WALKED: NORMAL
SPO2: NORMAL

## 2023-11-16 RX ORDER — ATORVASTATIN CALCIUM 40 MG/1
40 TABLET, FILM COATED ORAL DAILY
COMMUNITY

## 2023-11-16 NOTE — PROGRESS NOTES
[address] in [address]      LVAD  LVAD Type[de-identified] Left Ventricular Assist Device (LVAD)  Pump Speed (rpm): 4800  Pump Flow (lpm): 3.8  MAP (mmHg): 86  Set Low Speed (rpm): 4400  Pump Pulse Index (PI): 6.6  Pump Power (Ji): 3.3  Battery Life Checked: Yes  Backup Controller Present: Yes  Driveline Dressing: Clean, Dry and Intact  Outpatient: Yes  MAP in Therapeutic Range (Outpatient): Yes       Alistair Yousif was seen today in clinic for a visit with Dr. Carl Owens MD     Patient denied s/s of driveline infection or driveline trauma (including  drops). Denies LVAD alarms at home. Driveline inspected for integrity. Small area of previous blistering assessed. Area decreased in size and now appears to be a small abrasion. Scant serosanguinous drainage. Above reported to provider. LVAD interrogation completed in clinic, results reported to provider. See flow sheet for details. All orders entered per VORB. All provider instructions placed in AVS and reviewed with patient. Educated the patient and wife to continue to observe site of previous blister, call [facility] is site worsens, drainage increases, expected follow up. reviewed questions. Patient verbalized understanding, denied questions at end of visit.
[address] in [facility]. Briefly, Olegario Baxter is a 69 y.o. male with a history of HTN, HLD, DM2, SHERI on CPAP, chronic systolic heart failure, stage D due to ischemic cardiomyopathy, cardiac arrest s/p ICD 1/2013 ([address]), coronary artery disease s/p multiple interventions and 4V CABG (8/2012). LHC (7/2019) showed severe stenosis of LIMA to LAD anastomosis site, SVG graft to OM occluded, SVG graft to RCA 40-50%, LAD occluded proximally, severe proximal LCx, RCA is the long . PET (6/2019) EF 24% with anterior lateral and inferior reversible defect. He additionally has a history of anemia, microcytic with iron deficiency, DVT with filter. Patient was referred to [facility] by Dr. Rakesh House in 2021 for evaluation of his candidacy for advanced therapies. Patient agreed to inpatient admission for initiation of inotropes and evaluation for DT-LVAD. Patient was admitted 5/2-5/25/21. He was started on milrinone infusion and had first part of LVAD evaluation completed. Right and left heart cath on 5/24/21 showed severe diffuse native vessel CAD, with patent grafts (LIMA to D2, SVG to RCA). Pt was found to have pulmonary nodule during workup. He was found to have adenocarcinoma of the lung, and so LVAD was deferred pending treatment. Patient completed radiation treatment for adenocarcinoma of the lung. Hem-onc cleared patient for VAD implant with 90% 2 year prognosis. He presented to [facility] on 4/3/22 for admission for planned LVAD implant which he underwent on 4/5/22. This was complicated by bleeding intra-op, requiring cryo, k-centra, FFP, Plt, and cellsaver in OR. Had RV dysfunction noted intra-op; requiring lower VAD speed and dual inotrope support. 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 He also had issues with significant chest tube drainage requiring prolonged placement.   He was eventually discharged home on POD 29 with GDMT (no BB due to RV failure)

## 2023-11-17 ENCOUNTER — ANTI-COAG VISIT (OUTPATIENT)
Age: 78
End: 2023-11-17

## 2023-11-17 DIAGNOSIS — Z79.01 CHRONIC ANTICOAGULATION: Primary | ICD-10-CM

## 2023-11-17 LAB — INR BLD: 2

## 2023-11-17 NOTE — PROGRESS NOTES
[address] in [address] [place] [address] [place]      INR result reviewed with Dr. Carl Owens MD  who made the following recommendations (Jennye Balloon): no changes and recheck 11/14 . Patient's wife notified and verbalized understanding. They had no further questions.  (See anticoag tracker)     Luis Fernando Sutton RN

## 2023-11-20 ENCOUNTER — TELEPHONE (OUTPATIENT)
Age: 78
End: 2023-11-20

## 2023-11-20 NOTE — TELEPHONE ENCOUNTER
Contacted patient via phone for INR reminder. Telephone Call RE:  INR Reminder      Outcome:     [x] Patient verbalizes understanding    [] Unable to reach   [] Left message              [x]     Spoke with patient in reference to INR reminder for tomorrow. Patient agrees to conduct INR and self report.     Stephanie Zuniga [place]

## 2023-11-21 ENCOUNTER — ANTI-COAG VISIT (OUTPATIENT)
Age: 78
End: 2023-11-21
Payer: MEDICARE

## 2023-11-21 DIAGNOSIS — Z79.01 CHRONIC ANTICOAGULATION: Primary | ICD-10-CM

## 2023-11-21 LAB — INR BLD: 2.3

## 2023-11-21 PROCEDURE — 93793 ANTICOAG MGMT PT WARFARIN: CPT | Performed by: NURSE PRACTITIONER

## 2023-11-21 NOTE — PROGRESS NOTES
[address] [facility] in [place], [place]      INR result reviewed with Jimy Brito NP who made the following recommendations (Ronnie Burton): no changes and recheck 1 week. Patient's wife notified and verbalized understanding. They had no further questions.  (See anticoag tracker)     Narayan Brink RN

## 2023-11-27 ENCOUNTER — TELEPHONE (OUTPATIENT)
Age: 78
End: 2023-11-27

## 2023-11-27 NOTE — TELEPHONE ENCOUNTER
Contacted patient via phone for INR reminder. Telephone Call RE:  INR Reminder      Outcome:     [x] Patient verbalizes understanding    [] Unable to reach   [] Left message              [x]     Spoke with patient in reference to INR reminder for tomorrow. Patient agrees to conduct INR and self report.     Christiane Harrison MA

## 2023-11-28 ENCOUNTER — ANTI-COAG VISIT (OUTPATIENT)
Age: 78
End: 2023-11-28
Payer: MEDICARE

## 2023-11-28 DIAGNOSIS — Z79.01 CHRONIC ANTICOAGULATION: Primary | ICD-10-CM

## 2023-11-28 LAB — INR BLD: 2.3

## 2023-11-28 PROCEDURE — 93793 ANTICOAG MGMT PT WARFARIN: CPT | Performed by: NURSE PRACTITIONER

## 2023-12-04 ENCOUNTER — TELEPHONE (OUTPATIENT)
Age: 78
End: 2023-12-04

## 2023-12-04 DIAGNOSIS — Z79.01 CHRONIC ANTICOAGULATION: Primary | ICD-10-CM

## 2023-12-04 DIAGNOSIS — I50.20 HFREF (HEART FAILURE WITH REDUCED EJECTION FRACTION) (HCC): ICD-10-CM

## 2023-12-04 DIAGNOSIS — R06.02 SHORTNESS OF BREATH: ICD-10-CM

## 2023-12-04 DIAGNOSIS — Z79.01 CHRONIC ANTICOAGULATION: ICD-10-CM

## 2023-12-04 DIAGNOSIS — Z79.899 ENCOUNTER FOR MONITORING DIURETIC THERAPY: ICD-10-CM

## 2023-12-04 DIAGNOSIS — Z51.81 ENCOUNTER FOR MONITORING DIURETIC THERAPY: ICD-10-CM

## 2023-12-04 NOTE — TELEPHONE ENCOUNTER
Contacted patient via phone for lab reminder. Telephone Call RE:  Lab Reminder      Outcome:     [] Patient verbalizes understanding    [x] Unable to reach   [x] Left message              [x] Labs faxed to desired lab.       Brayden Allred

## 2023-12-05 LAB
BASOPHILS # BLD AUTO: 0 X10E3/UL (ref 0–0.2)
BASOPHILS NFR BLD AUTO: 1 %
EOSINOPHIL # BLD AUTO: 0.1 X10E3/UL (ref 0–0.4)
EOSINOPHIL NFR BLD AUTO: 2 %
ERYTHROCYTE [DISTWIDTH] IN BLOOD BY AUTOMATED COUNT: 14.2 % (ref 11.6–15.4)
HCT VFR BLD AUTO: 40.4 % (ref 37.5–51)
HGB BLD-MCNC: 13.2 G/DL (ref 13–17.7)
IMM GRANULOCYTES # BLD AUTO: 0 X10E3/UL (ref 0–0.1)
IMM GRANULOCYTES NFR BLD AUTO: 0 %
LYMPHOCYTES # BLD AUTO: 0.6 X10E3/UL (ref 0.7–3.1)
LYMPHOCYTES NFR BLD AUTO: 16 %
MCH RBC QN AUTO: 25.4 PG (ref 26.6–33)
MCHC RBC AUTO-ENTMCNC: 32.7 G/DL (ref 31.5–35.7)
MCV RBC AUTO: 78 FL (ref 79–97)
MONOCYTES # BLD AUTO: 0.4 X10E3/UL (ref 0.1–0.9)
MONOCYTES NFR BLD AUTO: 10 %
NEUTROPHILS # BLD AUTO: 2.9 X10E3/UL (ref 1.4–7)
NEUTROPHILS NFR BLD AUTO: 71 %
PLATELET # BLD AUTO: 206 X10E3/UL (ref 150–450)
RBC # BLD AUTO: 5.2 X10E6/UL (ref 4.14–5.8)
WBC # BLD AUTO: 4 X10E3/UL (ref 3.4–10.8)

## 2023-12-06 ENCOUNTER — ANTI-COAG VISIT (OUTPATIENT)
Age: 78
End: 2023-12-06
Payer: MEDICARE

## 2023-12-06 DIAGNOSIS — Z79.01 CHRONIC ANTICOAGULATION: Primary | ICD-10-CM

## 2023-12-06 LAB
ALBUMIN SERPL-MCNC: 4 G/DL (ref 3.8–4.8)
ALBUMIN/GLOB SERPL: 1.4 {RATIO} (ref 1.2–2.2)
ALP SERPL-CCNC: 140 IU/L (ref 44–121)
ALT SERPL-CCNC: 4 IU/L (ref 0–44)
AST SERPL-CCNC: 29 IU/L (ref 0–40)
BILIRUB SERPL-MCNC: 0.6 MG/DL (ref 0–1.2)
BUN SERPL-MCNC: 18 MG/DL (ref 8–27)
BUN/CREAT SERPL: 16 (ref 10–24)
CALCIUM SERPL-MCNC: 9.5 MG/DL (ref 8.6–10.2)
CHLORIDE SERPL-SCNC: 102 MMOL/L (ref 96–106)
CO2 SERPL-SCNC: 24 MMOL/L (ref 20–29)
CREAT SERPL-MCNC: 1.16 MG/DL (ref 0.76–1.27)
EGFRCR SERPLBLD CKD-EPI 2021: 65 ML/MIN/1.73
GLOBULIN SER CALC-MCNC: 2.8 G/DL (ref 1.5–4.5)
GLUCOSE SERPL-MCNC: 129 MG/DL (ref 70–99)
INR PPP: 2.3 (ref 0.9–1.2)
LDH SERPL L TO P-CCNC: 160 IU/L (ref 121–224)
MAGNESIUM SERPL-MCNC: 2 MG/DL (ref 1.6–2.3)
NT-PROBNP SERPL-MCNC: 790 PG/ML (ref 0–486)
POTASSIUM SERPL-SCNC: 4.8 MMOL/L (ref 3.5–5.2)
PROT SERPL-MCNC: 6.8 G/DL (ref 6–8.5)
PROTHROMBIN TIME: 23.1 SEC (ref 9.1–12)
SODIUM SERPL-SCNC: 141 MMOL/L (ref 134–144)

## 2023-12-06 PROCEDURE — 93793 ANTICOAG MGMT PT WARFARIN: CPT | Performed by: NURSE PRACTITIONER

## 2023-12-06 NOTE — PROGRESS NOTES
[address] [facility] in [place], [place]      INR result reviewed with Ethan Centeno NP who made the following recommendations (Pan Plummer): no changes and recheck 2 weeks. Patient's wife notified and verbalized understanding. They had no further questions.  (See anticoag tracker)     Mika Arias RN

## 2023-12-07 DIAGNOSIS — I50.22 CHRONIC SYSTOLIC HEART FAILURE (HCC): ICD-10-CM

## 2023-12-13 NOTE — PATIENT INSTRUCTIONS
Medication changes:    No changes     Please take this to your pharmacy to notify them of the change in medications. Testing Ordered:    none    Other Recommendations:      Ensure your drinking an adequate amount of water with a goal of 6-8 eight ounce glasses (1.5-2 liters) of fluid daily. Your urine should be clear and light yellow straw colored. If your blood pressure begins to consistently run below 90/60 and/or you begin to experience dizziness or lightheadedness, please contact the [place] [address] at [phone]. Follow up 4 weeks with [facility]      Please monitor your weights daily upon waking and after using the bathroom. Keep a written records of your weights and bring to your next appointment. If you have a weight gain of 3 or more pounds overnight OR 5 or more pounds in one week please contact our office. Thank you for allowing us the privilege of being a part of your healthcare team! Please do not hesitate to contact our office at [phone] with any questions or concerns. Virtual Heart Failure [address] invites you to learn more about heart failure and to share your questions, ideas, and experiences with others. Each month, the Heart Failure Support Group features a new educational topic and a guest speaker, followed by an interactive discussion. Our Heart Failure Nurse Navigator will moderate each session. You will be able to participate by phone, tablet or computer through [address] [place] [address]. This support group takes place on the 3rd Thursday of each month from 6:00-7:30PM. All individuals living with heart failure and their caregivers are welcome to join. If you are interested in participating, please contact us at [email or web address] and you will be sent the link to join the [email or web address].
normal...

## 2024-01-05 ENCOUNTER — TELEPHONE (OUTPATIENT)
Age: 79
End: 2024-01-05

## 2024-01-05 NOTE — TELEPHONE ENCOUNTER
Telephone Call RE:  Lab Reminder      Outcome: over due labwork      [x] Patient's wife  verbalizes understanding- states patient will go to lab early next week    [] Unable to reach   [] Left message              []       Ellen Monge RN

## 2024-01-05 NOTE — TELEPHONE ENCOUNTER
Patient's wife called stating that patient experienced an episode of chest pain yesterday.     Returned call to patient's wife who put patient on the phone. Patient stated that yesterday he was outside and experienced right sided achy chest pain that lasted about 1 hour. States he sat down and pain gradually went away. Denied shortness of breath, dizziness, nausea or other accompanying symptoms.     Reviewed the above with JORDEN Molina who stated VORB no interventions at this time, stated most likely musculoskeletal pain.      Returned call to patient's wife and educated no interventions from provider at this. Educated patient should call [facility] if symptoms reoccur. She verbalized understanding.    Detail Level: Generalized

## 2024-01-09 ENCOUNTER — ANTI-COAG VISIT (OUTPATIENT)
Age: 79
End: 2024-01-09
Payer: MEDICARE

## 2024-01-09 DIAGNOSIS — Z79.01 CHRONIC ANTICOAGULATION: Primary | ICD-10-CM

## 2024-01-09 LAB
ALBUMIN SERPL-MCNC: 4.2 G/DL (ref 3.8–4.8)
ALBUMIN/GLOB SERPL: 1.5 {RATIO} (ref 1.2–2.2)
ALP SERPL-CCNC: 158 IU/L (ref 44–121)
ALT SERPL-CCNC: 5 IU/L (ref 0–44)
AST SERPL-CCNC: 35 IU/L (ref 0–40)
BASOPHILS # BLD AUTO: 0 X10E3/UL (ref 0–0.2)
BASOPHILS NFR BLD AUTO: 1 %
BILIRUB SERPL-MCNC: 0.4 MG/DL (ref 0–1.2)
BUN SERPL-MCNC: 18 MG/DL (ref 8–27)
BUN/CREAT SERPL: 16 (ref 10–24)
CALCIUM SERPL-MCNC: 9.3 MG/DL (ref 8.6–10.2)
CHLORIDE SERPL-SCNC: 101 MMOL/L (ref 96–106)
CO2 SERPL-SCNC: 26 MMOL/L (ref 20–29)
CREAT SERPL-MCNC: 1.12 MG/DL (ref 0.76–1.27)
EGFRCR SERPLBLD CKD-EPI 2021: 68 ML/MIN/1.73
EOSINOPHIL # BLD AUTO: 0 X10E3/UL (ref 0–0.4)
EOSINOPHIL NFR BLD AUTO: 1 %
ERYTHROCYTE [DISTWIDTH] IN BLOOD BY AUTOMATED COUNT: 14.7 % (ref 11.6–15.4)
GLOBULIN SER CALC-MCNC: 2.8 G/DL (ref 1.5–4.5)
GLUCOSE SERPL-MCNC: 117 MG/DL (ref 70–99)
HCT VFR BLD AUTO: 43.4 % (ref 37.5–51)
HGB BLD-MCNC: 13.4 G/DL (ref 13–17.7)
IMM GRANULOCYTES # BLD AUTO: 0 X10E3/UL (ref 0–0.1)
IMM GRANULOCYTES NFR BLD AUTO: 0 %
INR PPP: 2.3 (ref 0.9–1.2)
LDH SERPL L TO P-CCNC: 168 IU/L (ref 121–224)
LYMPHOCYTES # BLD AUTO: 0.6 X10E3/UL (ref 0.7–3.1)
LYMPHOCYTES NFR BLD AUTO: 20 %
MAGNESIUM SERPL-MCNC: 2.2 MG/DL (ref 1.6–2.3)
MCH RBC QN AUTO: 24.7 PG (ref 26.6–33)
MCHC RBC AUTO-ENTMCNC: 30.9 G/DL (ref 31.5–35.7)
MCV RBC AUTO: 80 FL (ref 79–97)
MONOCYTES # BLD AUTO: 0.3 X10E3/UL (ref 0.1–0.9)
MONOCYTES NFR BLD AUTO: 10 %
NEUTROPHILS # BLD AUTO: 2.2 X10E3/UL (ref 1.4–7)
NEUTROPHILS NFR BLD AUTO: 68 %
PLATELET # BLD AUTO: 197 X10E3/UL (ref 150–450)
POTASSIUM SERPL-SCNC: 4.5 MMOL/L (ref 3.5–5.2)
PROT SERPL-MCNC: 7 G/DL (ref 6–8.5)
PROTHROMBIN TIME: 23.5 SEC (ref 9.1–12)
RBC # BLD AUTO: 5.43 X10E6/UL (ref 4.14–5.8)
SODIUM SERPL-SCNC: 141 MMOL/L (ref 134–144)
WBC # BLD AUTO: 3.2 X10E3/UL (ref 3.4–10.8)

## 2024-01-09 PROCEDURE — 93793 ANTICOAG MGMT PT WARFARIN: CPT | Performed by: NURSE PRACTITIONER

## 2024-01-09 NOTE — PROGRESS NOTES
ADVANCED HEART FAILURE CENTER  [facility] in [place], [place]      INR result reviewed with JORDEN Molina NP  who made the following recommendations (VORB): no changes and recheck 2 weeks. Patient's wife notified and verbalized understanding. They had no further questions. (See anticoag tracker)     Patient's wondering if patient would be seen sooner than scheduled appointment.  States patient has been feeling more fatigued lately. Denies lvad alarms or weight gain. States symptoms are non-specific and patient just doesn't seem like himself. Informed will look at schedule for earlier appointment and return patient to patient's wife, she verbalized understanding.       Ellen Monge RN

## 2024-01-10 ENCOUNTER — TELEPHONE (OUTPATIENT)
Age: 79
End: 2024-01-10

## 2024-01-10 LAB — BNP SERPL-MCNC: 233.8 PG/ML (ref 0–100)

## 2024-01-10 NOTE — TELEPHONE ENCOUNTER
Called patient's wife regarding request for earlier appointment.  States that patient has not endorsed any LVAD alarms or stool changes/melena to her. Requested she ask patient specifically if he has experienced any of these, and notify [facility] if so, she verbalized understanding.     Notified that patient could be seen for sooner appt January 18th. Patient's wife states she will need to wait until she gets home to clarify if patient can make that day. States she will call back.

## 2024-01-11 ENCOUNTER — TELEPHONE (OUTPATIENT)
Age: 79
End: 2024-01-11

## 2024-01-11 NOTE — TELEPHONE ENCOUNTER
Telephone Call RE:  Appointment reminder     Outcome:     [] Patient confirmed appointment   [] Patient rescheduled appointment for    [] Unable to reach  [] Left message              [x] Other:       Patient's wife confirmed patient's appointment.

## 2024-01-11 NOTE — TELEPHONE ENCOUNTER
Patient's wife returned call, requested appointment for 01/18. Patient scheduled per wife request.

## 2024-01-17 NOTE — PATIENT INSTRUCTIONS
Medication changes:    Increase hydralazine to 50mg three times a day     Testing Ordered:    6 minute walk complete in clinic today.      An order for echocardiogram was previously placed. This will be scheduled for you to accommodate a sooner testing date. We will contact you when this is scheduled.     Lab work has been drawn today. You will be contacted with any abnormal results requiring changes to your current plan of care.      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 Your urine should be clear and light yellow straw colored.       Monthly LVAD Education Tip:    LVAD TERMS AND FUNCTIONS     DRIVE LINE Exits out of your body at the “drive-line” site and is typically anchored to your stomach or upper thigh.     The all white cable that connects your  and pump inside your heart.     TIPS:  Never pull on your drive-line or let your power leads get twisted around it.       Controls and monitors your LVAD system. Uses lights, sounds, and on-screen messages to communicate your LVADs operating status.     TIPS:  Complete a self-test daily [in AM] by pressing and holding down the “battery” button x 3 seconds.      POWER LEADS White and black power leads - Each supply power to your LVAD from either the wall or batteries.     TIPS:  White power lead provides pump settings-Disconnect/Reconnect FIRST    NEVER disconnect both at the same time.      MOBILE POWER UNIT Plugs into an AC outlet [wall] to provide your LVAD power.     TIPS:  Use while napping and sleeping.       BATTERY You always need TWO batteries at a time to provide power to your LVAD.     Each battery inserts into a battery clip that connects the power lead to the .     TIPS:  Use when you are active, mobile,

## 2024-01-18 ENCOUNTER — OFFICE VISIT (OUTPATIENT)
Age: 79
End: 2024-01-18

## 2024-01-18 VITALS
SYSTOLIC BLOOD PRESSURE: 88 MMHG | OXYGEN SATURATION: 97 % | RESPIRATION RATE: 18 BRPM | BODY MASS INDEX: 28.07 KG/M2 | WEIGHT: 207 LBS | HEART RATE: 92 BPM

## 2024-01-18 DIAGNOSIS — D64.9 ANEMIA, UNSPECIFIED TYPE: Primary | ICD-10-CM

## 2024-01-18 DIAGNOSIS — R06.02 SHORTNESS OF BREATH: ICD-10-CM

## 2024-01-18 DIAGNOSIS — Z95.811 S/P VENTRICULAR ASSIST DEVICE (HCC): ICD-10-CM

## 2024-01-18 DIAGNOSIS — I50.20 HFREF (HEART FAILURE WITH REDUCED EJECTION FRACTION) (HCC): ICD-10-CM

## 2024-01-18 DIAGNOSIS — R53.83 FATIGUE, UNSPECIFIED TYPE: ICD-10-CM

## 2024-01-18 DIAGNOSIS — I50.22 CHRONIC SYSTOLIC HEART FAILURE (HCC): ICD-10-CM

## 2024-01-18 DIAGNOSIS — Z79.01 CHRONIC ANTICOAGULATION: ICD-10-CM

## 2024-01-18 LAB
DISTANCE WALKED: NORMAL
SPO2: NORMAL

## 2024-01-18 RX ORDER — CLOTRIMAZOLE 1 %
CREAM (GRAM) TOPICAL
COMMUNITY
Start: 2024-01-10

## 2024-01-18 RX ORDER — HYDRALAZINE HYDROCHLORIDE 25 MG/1
50 TABLET, FILM COATED ORAL 3 TIMES DAILY
Qty: 30 TABLET | Refills: 2 | Status: SHIPPED | OUTPATIENT
Start: 2024-01-18

## 2024-01-18 NOTE — PROGRESS NOTES
ADVANCED HEART FAILURE CENTER  [place], [place]  Mechanical Circulatory Support Clinic Note    Patient name: Bimal Chavira Sr  Patient : 1945  Patient MRN: [id number]  Date of service: 24    Primary care physician: Shruthi Valles MD  LVAD cardiologist: Eileen Marin MD    CHIEF COMPLAINT:  Postoperative care    BRIEF PATIENT SUMMARY:  78 y.o. with chronic systolic heart failure due to ischemic cardiomyopathy, s/p HM3 LVAD implant as DT on 22 by Dr. Flores complicated by intraoperative bleeding requiring multiple blood products and subsequent RV failure with severe TR requiring prolonged dobutamine wean and long term CT support. Left thoracentesis 22 by IR; 1.4L removed and no further complications.    INTERVAL HISTORY:  Today, patient presents for LVAD follow up.    Patient's biggest complaint today is fatigue.  He stated it started about a month ago.  He has difficulty standing for prolonged periods of time or walking due to fatigue.  He also states that his appetite has not been great and that he has lost some weight (207 today from 213 in November).  States it is not SOB but more fatigue that limits his walking.    Patient denies SOB, orthopnea, PND or nocturia.  Denies abnormal bleeding or stroke symptoms.    Patient denies irregular heart rate or palpitations. No presyncope or syncope.  Patient denies other cardiac symptoms such as chest pain or leg pain with walking.     Patient is compliant with fluid restriction and taking medications as prescribed. Patient manages his own medications.     Have you had any alarms     no  Have you had any redness at site   no  Have you had any drainage at site/on dressing no  Have you had any pain at site   no  Have you had any swelling at site   no  Do you need any equipment?     no    ASSESSMENT AND PLAN:  Heart Failure/Cardiomyopathy: compensated  Continue current medical therapy for heart

## 2024-01-18 NOTE — PROGRESS NOTES
ADVANCED HEART FAILURE CENTER  [facility] in [place], [place]    BP (!) 88/0 Comment: no palpable pulse  Pulse 92   Resp 18   Wt 93.9 kg (207 lb)   SpO2 97%   BMI 28.07 kg/m²     LVAD  LVAD Type:: Left Ventricular Assist Device (LVAD)  Pump Speed (rpm): 4850  Pump Flow (lpm): 3.8  MAP (mmHg): 88  Set Low Speed (rpm): 4800  Pump Pulse Index (PI): 6.3  Pump Power (Ji): 3.3  Battery Life Checked: Yes  Backup Controller Present: Yes  Driveline Dressing: Clean, Dry and Intact  Outpatient: Yes  MAP in Therapeutic Range (Outpatient): Yes     Bimal Chavira Sr was seen today in clinic for a visit with JORDEN Molina NP     Patient denied s/s of driveline infection or driveline trauma (including  drops). Denies LVAD alarms at home. Driveline inspected for integrity. Denies stool changes, melena, extremity edema, weight gain. States he feels like he has lost weight. Denied chest pain. Patient appeared somewhat short of breath walking from waiting room into clinic room. Endorses feeling more fatigued.  Above reported to provider.     LVAD interrogation completed in clinic, results reported to provider. See flow sheet for details.     All orders entered per VORB. All provider instructions placed in AVS and reviewed with patient. Educated the patient on ordered testing, expected follow up. reviewed questions. Patient verbalized understanding, denied questions at end of visit.     Patient's wife states she will be out of town Feb 6-13th.  Patient's daughter was trained on dressing at time of implant but patient's wife states she feels it has been a long time and not sure if she remembers how to perform dressing change, so patient and wife agreeable to patient having dressing changes in clinic.  Nurse visits for patient dressing changes scheduled.

## 2024-01-19 ENCOUNTER — HOSPITAL ENCOUNTER (OUTPATIENT)
Facility: HOSPITAL | Age: 79
End: 2024-01-19
Payer: MEDICARE

## 2024-01-19 ENCOUNTER — TELEPHONE (OUTPATIENT)
Age: 79
End: 2024-01-19

## 2024-01-19 ENCOUNTER — TRANSCRIBE ORDERS (OUTPATIENT)
Facility: HOSPITAL | Age: 79
End: 2024-01-19

## 2024-01-19 DIAGNOSIS — R06.00 DYSPNEA, UNSPECIFIED TYPE: ICD-10-CM

## 2024-01-19 DIAGNOSIS — D50.9 IRON DEFICIENCY ANEMIA, UNSPECIFIED IRON DEFICIENCY ANEMIA TYPE: ICD-10-CM

## 2024-01-19 DIAGNOSIS — C34.12 MALIGNANT NEOPLASM OF BRONCHUS OF LEFT UPPER LOBE (HCC): ICD-10-CM

## 2024-01-19 DIAGNOSIS — D50.9 IRON DEFICIENCY ANEMIA, UNSPECIFIED IRON DEFICIENCY ANEMIA TYPE: Primary | ICD-10-CM

## 2024-01-19 LAB
25(OH)D3+25(OH)D2 SERPL-MCNC: 42 NG/ML (ref 30–100)
BASOPHILS # BLD AUTO: 0 X10E3/UL (ref 0–0.2)
BASOPHILS NFR BLD AUTO: 0 %
EOSINOPHIL # BLD AUTO: 0.1 X10E3/UL (ref 0–0.4)
EOSINOPHIL NFR BLD AUTO: 1 %
ERYTHROCYTE [DISTWIDTH] IN BLOOD BY AUTOMATED COUNT: 14.7 % (ref 11.6–15.4)
FERRITIN SERPL-MCNC: 101 NG/ML (ref 30–400)
HCT VFR BLD AUTO: 45.4 % (ref 37.5–51)
HGB BLD-MCNC: 14.4 G/DL (ref 13–17.7)
IMM GRANULOCYTES # BLD AUTO: 0 X10E3/UL (ref 0–0.1)
IMM GRANULOCYTES NFR BLD AUTO: 0 %
IRON SATN MFR SERPL: 21 % (ref 15–55)
IRON SERPL-MCNC: 56 UG/DL (ref 38–169)
LYMPHOCYTES # BLD AUTO: 0.9 X10E3/UL (ref 0.7–3.1)
LYMPHOCYTES NFR BLD AUTO: 19 %
MCH RBC QN AUTO: 25.3 PG (ref 26.6–33)
MCHC RBC AUTO-ENTMCNC: 31.7 G/DL (ref 31.5–35.7)
MCV RBC AUTO: 80 FL (ref 79–97)
MONOCYTES # BLD AUTO: 0.4 X10E3/UL (ref 0.1–0.9)
MONOCYTES NFR BLD AUTO: 8 %
NEUTROPHILS # BLD AUTO: 3.4 X10E3/UL (ref 1.4–7)
NEUTROPHILS NFR BLD AUTO: 72 %
PLATELET # BLD AUTO: 206 X10E3/UL (ref 150–450)
RBC # BLD AUTO: 5.7 X10E6/UL (ref 4.14–5.8)
TIBC SERPL-MCNC: 261 UG/DL (ref 250–450)
UIBC SERPL-MCNC: 205 UG/DL (ref 111–343)
WBC # BLD AUTO: 4.7 X10E3/UL (ref 3.4–10.8)

## 2024-01-19 PROCEDURE — 71046 X-RAY EXAM CHEST 2 VIEWS: CPT

## 2024-01-19 NOTE — TELEPHONE ENCOUNTER
Called and scheduled patient's echo 01/24 @ 2pm. Called and spoke to patient's wife and notified of echo scheduled at this time. She confirmed patient okay with this time.

## 2024-01-22 ENCOUNTER — TELEPHONE (OUTPATIENT)
Age: 79
End: 2024-01-22

## 2024-01-22 NOTE — TELEPHONE ENCOUNTER
Contacted patient via phone for INR reminder.    Telephone Call RE:  INR Reminder      Outcome:     [x] Patient verbalizes understanding    [] Unable to reach   [] Left message              [x]     Spoke with patient in reference to INR reminder for tomorrow. Patient agrees to conduct INR and self report.    Tessy Rosenthal MA

## 2024-01-22 NOTE — TELEPHONE ENCOUNTER
Called and spoke with Jessie at [facility]- requested patient's recent office visit note with Dr. Edmondson. Staff stated they will fax this shortly.

## 2024-01-23 ENCOUNTER — ANTI-COAG VISIT (OUTPATIENT)
Age: 79
End: 2024-01-23
Payer: MEDICARE

## 2024-01-23 DIAGNOSIS — R06.02 SHORTNESS OF BREATH: ICD-10-CM

## 2024-01-23 DIAGNOSIS — Z95.811 S/P VENTRICULAR ASSIST DEVICE (HCC): ICD-10-CM

## 2024-01-23 DIAGNOSIS — I50.20 HFREF (HEART FAILURE WITH REDUCED EJECTION FRACTION) (HCC): ICD-10-CM

## 2024-01-23 DIAGNOSIS — Z79.899 ENCOUNTER FOR MONITORING DIURETIC THERAPY: ICD-10-CM

## 2024-01-23 DIAGNOSIS — Z79.01 CHRONIC ANTICOAGULATION: ICD-10-CM

## 2024-01-23 DIAGNOSIS — Z51.81 ENCOUNTER FOR MONITORING DIURETIC THERAPY: ICD-10-CM

## 2024-01-23 DIAGNOSIS — Z79.01 CHRONIC ANTICOAGULATION: Primary | ICD-10-CM

## 2024-01-23 DIAGNOSIS — I50.22 CHRONIC SYSTOLIC HEART FAILURE (HCC): ICD-10-CM

## 2024-01-23 LAB — INR BLD: 2.4

## 2024-01-23 PROCEDURE — 93793 ANTICOAG MGMT PT WARFARIN: CPT | Performed by: NURSE PRACTITIONER

## 2024-01-23 NOTE — PROGRESS NOTES
[facility] [place], [place]      INR result reviewed with BERTA Kaplan NP who made the following recommendations (VORB): no changes and recheck 1 week. Patient notified and verbalized understanding. They had no further questions. (See anticoag tracker)     Ellen Monge RN

## 2024-01-24 ENCOUNTER — TELEPHONE (OUTPATIENT)
Age: 79
End: 2024-01-24

## 2024-01-24 ENCOUNTER — HOSPITAL ENCOUNTER (OUTPATIENT)
Facility: HOSPITAL | Age: 79
Discharge: HOME | End: 2024-01-26
Attending: INTERNAL MEDICINE
Payer: MEDICARE

## 2024-01-24 ENCOUNTER — NURSE ONLY (OUTPATIENT)
Age: 79
End: 2024-01-24

## 2024-01-24 VITALS — HEIGHT: 72 IN | WEIGHT: 207.01 LBS | BODY MASS INDEX: 28.04 KG/M2

## 2024-01-24 VITALS — SYSTOLIC BLOOD PRESSURE: 80 MMHG

## 2024-01-24 DIAGNOSIS — I50.22 CHRONIC SYSTOLIC HEART FAILURE (HCC): ICD-10-CM

## 2024-01-24 DIAGNOSIS — Z95.811 S/P VENTRICULAR ASSIST DEVICE (HCC): ICD-10-CM

## 2024-01-24 PROCEDURE — 93306 TTE W/DOPPLER COMPLETE: CPT

## 2024-01-24 NOTE — PROGRESS NOTES
ADVANCED HEART FAILURE CENTER  [facility] in [place], [place]  LVAD Outpatient Nurse Visit    Chief Complaint   Patient presents with    Other     Nurse visit BP check          BP (!) 80/0 Comment: no palpable pulse       Bimal Chavira Sr is a 79 y.o. male with a history of ICM with Heartmate 3 implant date of 04/05/22.     Patient was seen in clinic for nurse visit for BP check. Pt confirmed he has been taking increased hydralazine dose.     Reviewed all information with JORDEN Molina who recommended V.O.R.B no medication changes at this time, continue hydralazine dose.     All instructions placed in after visit summary and reviewed with patient. Time given to ask questions. Patient verbalized understanding and had no further questions.

## 2024-01-24 NOTE — TELEPHONE ENCOUNTER
Reviewed chest xray with JORDEN Molina who stated effusions may be contributing to shortness of breath however will deffer any interventions to Dr. Hall.

## 2024-01-24 NOTE — PATIENT INSTRUCTIONS
the back two batteries forward to the front of the charging station       Charging the Back Up  (To be done TWICE YEARLY):       Changing the MPU Batteries (To be done TWICE YEARLY):                 Follow up as previously recommended  with [facility]      For further patient and caregiver resources as well as access to online LVAD support groups visit [email or web address]       Please bring your daily sheets and medications to your next appointment.      Please monitor your weights daily upon waking and after using the bathroom. Keep a written records of your weights and bring to your next appointment. If you have a weight gain of 3 or more pounds overnight OR 5 or more pounds in one week please contact our office.       Thank you for allowing us the privilege of being a part of your healthcare team! Please do not hesitate to contact our office at [phone] with any questions or concerns.

## 2024-01-24 NOTE — TELEPHONE ENCOUNTER
----- Message from SHAQ Edwards NP sent at 1/24/2024  2:43 PM EST -----  Maybe but they are not huge so I can't say for sure.  He might need a diagnostic tap but I will defer to Dr. Hall since she ordered the test and is following him for his hx of cancer.  ----- Message -----  From: Ellen Monge RN  Sent: 1/24/2024   1:48 PM EST  To: SHAQ Edwards NP    Pt and wife were asking about the results of this if the effusions could be contributing to shortness of breath? Says its a little better today.  Thanks!

## 2024-01-25 LAB
ECHO AO ROOT DIAM: 3.8 CM
ECHO AO ROOT INDEX: 1.76 CM/M2
ECHO BSA: 2.18 M2
ECHO LA DIAMETER INDEX: 1.62 CM/M2
ECHO LA DIAMETER: 3.5 CM
ECHO LA TO AORTIC ROOT RATIO: 0.92
ECHO LV FRACTIONAL SHORTENING: 14 % (ref 28–44)
ECHO LV INTERNAL DIMENSION DIASTOLE INDEX: 2.27 CM/M2
ECHO LV INTERNAL DIMENSION DIASTOLIC: 4.9 CM (ref 4.2–5.9)
ECHO LV INTERNAL DIMENSION SYSTOLIC INDEX: 1.94 CM/M2
ECHO LV INTERNAL DIMENSION SYSTOLIC: 4.2 CM
ECHO LV IVSD: 1.2 CM (ref 0.6–1)
ECHO LV MASS 2D: 239.9 G (ref 88–224)
ECHO LV MASS INDEX 2D: 111 G/M2 (ref 49–115)
ECHO LV POSTERIOR WALL DIASTOLIC: 1.3 CM (ref 0.6–1)
ECHO LV RELATIVE WALL THICKNESS RATIO: 0.53
ECHO LVOT AREA: 3.5 CM2
ECHO LVOT DIAM: 2.1 CM
ECHO PULMONARY ARTERY END DIASTOLIC PRESSURE: 13 MMHG
ECHO PV MAX VELOCITY: 1 M/S
ECHO PV PEAK GRADIENT: 4 MMHG
ECHO RV BASAL DIMENSION: 5.2 CM
ECHO RV FREE WALL PEAK S': 4 CM/S
ECHO RV MID DIMENSION: 4 CM
ECHO RV TAPSE: 1.1 CM (ref 1.7–?)
ECHO TV REGURGITANT MAX VELOCITY: 2.23 M/S
ECHO TV REGURGITANT PEAK GRADIENT: 20 MMHG

## 2024-01-25 NOTE — TELEPHONE ENCOUNTER
Called and left voicemail with patient's wife notifying that provider reviewed chest xray, effusions may be contributing slightly shortness of breat and would like to deffer management to Dr. Hall at this time.

## 2024-01-29 ENCOUNTER — TELEPHONE (OUTPATIENT)
Age: 79
End: 2024-01-29

## 2024-01-29 DIAGNOSIS — I50.20 HFREF (HEART FAILURE WITH REDUCED EJECTION FRACTION) (HCC): ICD-10-CM

## 2024-01-29 NOTE — TELEPHONE ENCOUNTER
Patient's wife called and stated that she has finished patient's last hydralazine bottle and the current bottle that she has now is 10mg pills. Patient's wife wanted to clarify dosing and this prescription bottle does not reflect latest script (25mg pills x2 twice a day). Reviewed with patient's wife that patient dosing should be 50mg three times a day, she verbalized understanding, stated she will check pill bottles and clarify she does not have a different updated one.     Called and spoke with damian who stated they prescription for patient hydralazine 25mg, 2 pills TID. (Updated quantity of 180 pills given to pharmacy over the phone). States that patient has not picked this up.     Patient's wife returned call and stated she believe bottle of patent's hydralazine 10mg is outdated. Confirms patient has been receiving increased dose. Informed of script available for  at [facility]. Patients' wife states she will  new prescription today.

## 2024-01-31 NOTE — PROGRESS NOTES
[facility] in [place], [place]    Attempted to meet with patient for ongoing LVAD education. Patient sleeping and no family at bedside. Will continue to follow for ongoing LVAD education and support. Jeanie Castañeda RN. Normal rate, regular rhythm.  Heart sounds S1, S2.  No murmurs, rubs or gallops.

## 2024-02-02 ENCOUNTER — TELEPHONE (OUTPATIENT)
Age: 79
End: 2024-02-02

## 2024-02-05 ENCOUNTER — TELEPHONE (OUTPATIENT)
Age: 79
End: 2024-02-05

## 2024-02-05 RX ORDER — FAMOTIDINE 20 MG/1
20 TABLET, FILM COATED ORAL EVERY 12 HOURS
Qty: 60 TABLET | Refills: 2 | Status: SHIPPED | OUTPATIENT
Start: 2024-02-05

## 2024-02-05 NOTE — TELEPHONE ENCOUNTER
Telephone Call RE:  Lab Reminder      Outcome:     [x] Patient verbalizes understanding    [] Unable to reach   [] Left message              []     Spoke with pt's wife.  He will have labs done at his nurse visit on Wed, 2/7/24.    Cosme Pickens

## 2024-02-05 NOTE — TELEPHONE ENCOUNTER
Requested Prescriptions     Signed Prescriptions Disp Refills    famotidine (PEPCID) 20 MG tablet 60 tablet 2     Sig: Take 1 tablet by mouth in the morning and 1 tablet in the evening.     Authorizing Provider: PATTI NOLASCO     Ordering User: IFEANYI DAMIAN     Patient's wife called requesting refill sent to local pharmacy

## 2024-02-06 ENCOUNTER — TELEPHONE (OUTPATIENT)
Age: 79
End: 2024-02-06

## 2024-02-07 ENCOUNTER — NURSE ONLY (OUTPATIENT)
Age: 79
End: 2024-02-07

## 2024-02-07 DIAGNOSIS — R06.02 SHORTNESS OF BREATH: ICD-10-CM

## 2024-02-07 DIAGNOSIS — Z79.899 ENCOUNTER FOR MONITORING DIURETIC THERAPY: ICD-10-CM

## 2024-02-07 DIAGNOSIS — R68.89 SUSPECTED SOFT TISSUE INFECTION: ICD-10-CM

## 2024-02-07 DIAGNOSIS — Z01.89 ROUTINE LAB DRAW: ICD-10-CM

## 2024-02-07 DIAGNOSIS — Z79.01 CHRONIC ANTICOAGULATION: ICD-10-CM

## 2024-02-07 DIAGNOSIS — I50.20 HFREF (HEART FAILURE WITH REDUCED EJECTION FRACTION) (HCC): Primary | ICD-10-CM

## 2024-02-07 DIAGNOSIS — Z51.81 ENCOUNTER FOR MONITORING DIURETIC THERAPY: ICD-10-CM

## 2024-02-07 DIAGNOSIS — Z95.811 S/P VENTRICULAR ASSIST DEVICE (HCC): ICD-10-CM

## 2024-02-07 RX ORDER — DOXYCYCLINE HYCLATE 100 MG
100 TABLET ORAL 2 TIMES DAILY
Qty: 20 TABLET | Refills: 0 | Status: SHIPPED | OUTPATIENT
Start: 2024-02-07 | End: 2024-02-17

## 2024-02-07 NOTE — PROGRESS NOTES
ADVANCED HEART FAILURE CENTER  [facility] in [place], [place]  LVAD Outpatient Nurse Visit    Chief Complaint   Patient presents with    Other     Dressing change        There were no vitals taken for this visit.     LVAD  LVAD Type:: Left Ventricular Assist Device (LVAD)  Backup Controller Present: Yes  Driveline Dressing: Changed per order         Bimal Chavira Sr is a 79 y.o. male with a history of ICM with Heartmate 3 implant date of 04/05/22.      Patient was seen in clinic for nurse visit for   LVAD drive line dressing change as his wife is traveling.     Driveline dressing change completed using sterile technique. Driveline site with slight scabbing, scant bloody drainage. Patient states he thinks he pulled line on a door handle 2 or 3 days ago.  Denies any pain or tenderness.     Reviewed all information with BERTA Kaplan who recommended V.O.R.B 10 day course of doxycycline 100mg BID, driveline culture and daily dressing changes.     All instructions placed in after visit summary and reviewed with patient. Education provided on called office ASAP for drive line pulls, new medication. Time given to ask questions. Patient verbalized understanding and had no further questions.

## 2024-02-07 NOTE — ADDENDUM NOTE
Addended by: IFEANYI DAMIAN on: 2/7/2024 04:15 PM     Modules accepted: Orders, Level of Service

## 2024-02-07 NOTE — PATIENT INSTRUCTIONS
Medication changes:    START doxycycline 100mg twice a day for 10 days       Testing Ordered:    Lab work has been drawn today. You will be contacted with any abnormal results requiring changes to your current plan of care.  Wound culture was also collected today     Other Recommendations:     Continue to change drive line exit site dressing 3 times a week using sterile technique,     Ensure you are drinking an adequate amount of water with a goal of 6-8 eight ounce glasses (1.5-2 liters) of fluid daily. Your urine should be clear and light yellow straw colored.       Monthly LVAD Education Tip:    LVAD Equipment Maintentance:    Cleaning and Caring for Equipment:     General Cleaning Rules:  Use a damp cloth to clean exterior surfaces of the external parts of equipment as needed.  Water, with or without a mild dish soap, may be used as a surface .   Do NOT allow water to enter the interior of devices or put equipment into water or liquid. Submersion in water or liquid may cause LVAD to stop!    Caring for the :  Clean the outside parts of the  with a damp, lint-free cloth as needed. You may use warm water and a mild dish soap if more aggressive cleaning needed.  NEVER put the  into water or liquid. This may cause the pump to stop.   DO NOT disconnect the  from the driveline or power source for cleaning. This will cause the pump to stop.  Clean the outside parts of the  power cables with a damp, lint-free cloth. You may use warm water and a mild dish soap if more aggressive cleaning needed.  Keep the power cables dry and away from water or liquid as this may cause operation failure or electric shock.   Daily:  Perform a  self test  Inspect the power cable connectors for dirt, grease, or damage  At least Monthly:  Check the 's power cable connector pins (one at a time) for dirt or grease. If

## 2024-02-08 ENCOUNTER — NURSE ONLY (OUTPATIENT)
Age: 79
End: 2024-02-08

## 2024-02-08 ENCOUNTER — TELEPHONE (OUTPATIENT)
Age: 79
End: 2024-02-08

## 2024-02-08 ENCOUNTER — ANTI-COAG VISIT (OUTPATIENT)
Age: 79
End: 2024-02-08

## 2024-02-08 DIAGNOSIS — Z79.01 CHRONIC ANTICOAGULATION: Primary | ICD-10-CM

## 2024-02-08 LAB
ALBUMIN SERPL-MCNC: 3.8 G/DL (ref 3.8–4.8)
ALBUMIN/GLOB SERPL: 1.2 {RATIO} (ref 1.2–2.2)
ALP SERPL-CCNC: 142 IU/L (ref 44–121)
ALT SERPL-CCNC: 5 IU/L (ref 0–44)
AST SERPL-CCNC: 35 IU/L (ref 0–40)
BASOPHILS # BLD AUTO: 0 X10E3/UL (ref 0–0.2)
BASOPHILS NFR BLD AUTO: 1 %
BILIRUB SERPL-MCNC: 0.4 MG/DL (ref 0–1.2)
BUN SERPL-MCNC: 14 MG/DL (ref 8–27)
BUN/CREAT SERPL: 13 (ref 10–24)
CALCIUM SERPL-MCNC: 9.5 MG/DL (ref 8.6–10.2)
CHLORIDE SERPL-SCNC: 103 MMOL/L (ref 96–106)
CO2 SERPL-SCNC: 24 MMOL/L (ref 20–29)
CREAT SERPL-MCNC: 1.09 MG/DL (ref 0.76–1.27)
EGFRCR SERPLBLD CKD-EPI 2021: 69 ML/MIN/1.73
EOSINOPHIL # BLD AUTO: 0 X10E3/UL (ref 0–0.4)
EOSINOPHIL NFR BLD AUTO: 1 %
ERYTHROCYTE [DISTWIDTH] IN BLOOD BY AUTOMATED COUNT: 15.5 % (ref 11.6–15.4)
GLOBULIN SER CALC-MCNC: 3.1 G/DL (ref 1.5–4.5)
GLUCOSE SERPL-MCNC: 111 MG/DL (ref 70–99)
HCT VFR BLD AUTO: 42 % (ref 37.5–51)
HGB BLD-MCNC: 12.9 G/DL (ref 13–17.7)
IMM GRANULOCYTES # BLD AUTO: 0 X10E3/UL (ref 0–0.1)
IMM GRANULOCYTES NFR BLD AUTO: 0 %
INR PPP: 1.8 (ref 0.9–1.2)
LDH SERPL L TO P-CCNC: 146 IU/L (ref 121–224)
LYMPHOCYTES # BLD AUTO: 0.5 X10E3/UL (ref 0.7–3.1)
LYMPHOCYTES NFR BLD AUTO: 18 %
MAGNESIUM SERPL-MCNC: 2.1 MG/DL (ref 1.6–2.3)
MCH RBC QN AUTO: 25 PG (ref 26.6–33)
MCHC RBC AUTO-ENTMCNC: 30.7 G/DL (ref 31.5–35.7)
MCV RBC AUTO: 81 FL (ref 79–97)
MONOCYTES # BLD AUTO: 0.3 X10E3/UL (ref 0.1–0.9)
MONOCYTES NFR BLD AUTO: 11 %
NEUTROPHILS # BLD AUTO: 2 X10E3/UL (ref 1.4–7)
NEUTROPHILS NFR BLD AUTO: 69 %
NT-PROBNP SERPL-MCNC: 700 PG/ML (ref 0–486)
PLATELET # BLD AUTO: 198 X10E3/UL (ref 150–450)
POTASSIUM SERPL-SCNC: 4.5 MMOL/L (ref 3.5–5.2)
PROT SERPL-MCNC: 6.9 G/DL (ref 6–8.5)
PROTHROMBIN TIME: 19.1 SEC (ref 9.1–12)
RBC # BLD AUTO: 5.17 X10E6/UL (ref 4.14–5.8)
SODIUM SERPL-SCNC: 139 MMOL/L (ref 134–144)
WBC # BLD AUTO: 2.9 X10E3/UL (ref 3.4–10.8)

## 2024-02-08 NOTE — PROGRESS NOTES
ADVANCED HEART FAILURE CENTER  [facility] in [place], [place]      INR result reviewed with JORDEN Molina who made the following recommendations (VORB): 7.5mg tonight and recheck 1 week. Patient notified and verbalized understanding. They had no further questions. (See anticoag tracker)     Ellen Monge RN

## 2024-02-08 NOTE — PATIENT INSTRUCTIONS
change in tone or loudness, the audio speakers may need to be cleaned using a small  cotton swab moistened (not dripping) with alcohol. Do NOT use anything sharp to clean the sound holes as this could cause damage.   Every 6 Months:  Charge the back up battery in the back up .    Caring for the Mobile Power Unit  As needed, unplug the MPU and clean the exterior surfaces with a clean, damp (not wet) cloth. You may use a mild detergent if more aggressive cleaning needed.   Allow the MPU to dry completely before plugging back in. Failure to do so could result in electric shock.   Daily:  Inspect the MPU power cord for damage or wear.  Inspect the connector pins and sockets of the patient cable for dirt, grease, or damage.   Do NOT use the MPU if there are any signs of damage and notify the VAD coordinator right away.   Weekly:  Monitor for kinks, splits, cracks or cuts in the power cord and MPU patient cable.  Do NOT use the MPU if there are any signs of damage and notify the VAD coordinator right away.   Every 6 Months:  Change the three AA batteries located in the back of the Mobile Power Unit    Caring for the Batteries and Clips  Clean the metal battery clips and interior contacts of battery clips monthly with an alcohol swab.  Allow the alcohol to dry before use.  Do not clean batteries and clips while in use.   Daily:  Inspect connector on batteries and clips for dirt, grease or damage.  Do NOT use the batteries and clips if there are any signs of damage and notify the VAD coordinator right away.     Caring for the Battery Charger  Turn off the battery charger using the power switch located on the back of the  and clean metal contacts with an alcohol swab as needed.   Allow the alcohol to dry before use.  Do not clean the battery charger while in use.  Weekly  Inspect for signs of damage such as dents, chips or cracks. Do NOT use the Battery Charger if there are signs of damage. Notify the

## 2024-02-08 NOTE — PROGRESS NOTES
ADVANCED HEART FAILURE CENTER  [facility] in [place], [place]  LVAD Outpatient Nurse Visit    Chief Complaint   Patient presents with    Other     Nurse visit- LVAD dressing change         There were no vitals taken for this visit.       Bimal Chavira Sr is a 79 y.o. male with a history of ICM with Heartmate 3 implant date of 04/05/22.      Patient was seen in clinic for nurse visit for   LVAD drive line dressing change as his wife is traveling.      Driveline dressing change completed using sterile technique. Driveline site with no fresh drainage, minimal dried drainage.     Patient confirmed that he started antibiotics.      All instructions placed in after visit summary and reviewed with patient. Education provided on follow up tomorrow for dressing change. Time given to ask questions. Patient verbalized understanding and had no further questions.

## 2024-02-09 ENCOUNTER — NURSE ONLY (OUTPATIENT)
Age: 79
End: 2024-02-09

## 2024-02-09 NOTE — PROGRESS NOTES
ADVANCED HEART FAILURE CENTER  [facility] in [place], [place]  LVAD Outpatient Nurse Visit    Chief Complaint   Patient presents with    Other     Nurse visit- Dressing change        There were no vitals taken for this visit.     LVAD  Driveline Dressing: Changed per order       Bimal Chavira Sr is a 79 y.o. male with a history of ICM with Heartmate 3 implant date of 04/05/22.      Patient was seen in clinic for nurse visit for   LVAD drive line dressing change as his wife is traveling.      Driveline dressing change completed using sterile technique. Driveline site with no fresh drainage, minimal dried drainage.      All instructions placed in after visit summary and reviewed with patient. Time given to ask questions. Patient verbalized understanding and had no further questions.

## 2024-02-09 NOTE — PATIENT INSTRUCTIONS
Medication changes:    None today       Testing Ordered:    None today     Other Recommendations:     Continue to change drive line exit site dressing 3 times a week using sterile technique,     Ensure you are drinking an adequate amount of water with a goal of 6-8 eight ounce glasses (1.5-2 liters) of fluid daily. Your urine should be clear and light yellow straw colored.       Monthly LVAD Education Tip:    LVAD Equipment Maintentance:    Cleaning and Caring for Equipment:     General Cleaning Rules:  Use a damp cloth to clean exterior surfaces of the external parts of equipment as needed.  Water, with or without a mild dish soap, may be used as a surface .   Do NOT allow water to enter the interior of devices or put equipment into water or liquid. Submersion in water or liquid may cause LVAD to stop!    Caring for the :  Clean the outside parts of the  with a damp, lint-free cloth as needed. You may use warm water and a mild dish soap if more aggressive cleaning needed.  NEVER put the  into water or liquid. This may cause the pump to stop.   DO NOT disconnect the  from the driveline or power source for cleaning. This will cause the pump to stop.  Clean the outside parts of the  power cables with a damp, lint-free cloth. You may use warm water and a mild dish soap if more aggressive cleaning needed.  Keep the power cables dry and away from water or liquid as this may cause operation failure or electric shock.   Daily:  Perform a  self test  Inspect the power cable connectors for dirt, grease, or damage  At least Monthly:  Check the 's power cable connector pins (one at a time) for dirt or grease. If you find dirt or damage notify the LVAD Coordinators right away. DO NOT attempt to clean or repair yourself.  Inspect they 's audio sounders for dirt or grease. If you notice a

## 2024-02-11 LAB — BACTERIA SPEC AEROBE CULT: NORMAL

## 2024-02-12 ENCOUNTER — TELEPHONE (OUTPATIENT)
Age: 79
End: 2024-02-12

## 2024-02-12 ENCOUNTER — NURSE ONLY (OUTPATIENT)
Age: 79
End: 2024-02-12

## 2024-02-12 LAB
BACTERIA SPEC AEROBE CULT: NORMAL
BACTERIA SPEC ANAEROBE CULT: NORMAL

## 2024-02-12 NOTE — TELEPHONE ENCOUNTER
Called and spoke with patient. Informed no final result back this morning on wound culture.  Confirmed patient for 1:30 nurse visit for dressing change. Patient verbalized understanding.

## 2024-02-12 NOTE — PROGRESS NOTES
ADVANCED HEART FAILURE CENTER  [facility] in [place], [place]  LVAD Outpatient Nurse Visit          Chief Complaint   Patient presents with    Other       Nurse visit- Dressing change          There were no vitals taken for this visit.      LVAD  Driveline Dressing: Changed per order        Bimal Chavira Sr is a 79 y.o. male with a history of ICM with Heartmate 3 implant date of 04/05/22.      Patient was seen in clinic for nurse visit for   LVAD drive line dressing change as his wife is traveling.      Driveline dressing change completed using sterile technique. Driveline site with no fresh drainage, minimal dried drainage.      All instructions placed in after visit summary and reviewed with patient. Time given to ask questions. Patient verbalized understanding and had no further questions.

## 2024-02-12 NOTE — PATIENT INSTRUCTIONS
Medication changes:    None today       Testing Ordered:    None today     Other Recommendations:     Continue daily dressing changes until otherwise instructed      Ensure you are drinking an adequate amount of water with a goal of 6-8 eight ounce glasses (1.5-2 liters) of fluid daily. Your urine should be clear and light yellow straw colored.       Monthly LVAD Education Tip:    LVAD Equipment Maintentance:    Cleaning and Caring for Equipment:     General Cleaning Rules:  Use a damp cloth to clean exterior surfaces of the external parts of equipment as needed.  Water, with or without a mild dish soap, may be used as a surface .   Do NOT allow water to enter the interior of devices or put equipment into water or liquid. Submersion in water or liquid may cause LVAD to stop!    Caring for the :  Clean the outside parts of the  with a damp, lint-free cloth as needed. You may use warm water and a mild dish soap if more aggressive cleaning needed.  NEVER put the  into water or liquid. This may cause the pump to stop.   DO NOT disconnect the  from the driveline or power source for cleaning. This will cause the pump to stop.  Clean the outside parts of the  power cables with a damp, lint-free cloth. You may use warm water and a mild dish soap if more aggressive cleaning needed.  Keep the power cables dry and away from water or liquid as this may cause operation failure or electric shock.   Daily:  Perform a  self test  Inspect the power cable connectors for dirt, grease, or damage  At least Monthly:  Check the 's power cable connector pins (one at a time) for dirt or grease. If you find dirt or damage notify the LVAD Coordinators right away. DO NOT attempt to clean or repair yourself.  Inspect they 's audio sounders for dirt or grease. If you notice a change in tone or loudness,

## 2024-02-14 ENCOUNTER — TELEPHONE (OUTPATIENT)
Age: 79
End: 2024-02-14

## 2024-02-14 NOTE — TELEPHONE ENCOUNTER
Telephone Call RE:  Lab Reminder      Outcome:     [x] Patient verbalizes understanding    [] Unable to reach   [] Left message              []       Cosme Pickens

## 2024-02-14 NOTE — TELEPHONE ENCOUNTER
Reviewed negative wound culture result with BERTA Kaplan NP, who stated VORB patient can return to 3x a week dressing changes.  No further antibiotics after his finishes current coarse.     Left voicemail with patient's wife notifying of negative culture and provider instructions. Instructed patient's wife patient can return to 3x a week dressing changes, finish course of antibiotics. Requested call back to confirm she received message

## 2024-02-15 ENCOUNTER — ANTI-COAG VISIT (OUTPATIENT)
Age: 79
End: 2024-02-15

## 2024-02-15 DIAGNOSIS — Z79.01 CHRONIC ANTICOAGULATION: Primary | ICD-10-CM

## 2024-02-15 LAB — INR BLD: 2.8

## 2024-02-15 NOTE — PROGRESS NOTES
ADVANCED HEART FAILURE CENTER  [facility] in [place], [place]      INR result reviewed with BERTA Kaplan NP who made the following recommendations (VORB): no changes and recheck 1 week. Patient's wife notified of provider instructions via voicemail, requested call back to confirm (See anticoag tracker)     Patient's wife returned call, confirmed she received message     Ellen Monge RN

## 2024-02-20 ENCOUNTER — HOSPITAL ENCOUNTER (OUTPATIENT)
Facility: HOSPITAL | Age: 79
Discharge: HOME | End: 2024-02-23
Attending: INTERNAL MEDICINE
Payer: MEDICARE

## 2024-02-20 DIAGNOSIS — R06.00 DYSPNEA, UNSPECIFIED TYPE: ICD-10-CM

## 2024-02-20 DIAGNOSIS — D50.9 IRON DEFICIENCY ANEMIA, UNSPECIFIED IRON DEFICIENCY ANEMIA TYPE: ICD-10-CM

## 2024-02-20 DIAGNOSIS — C34.12 MALIGNANT NEOPLASM OF UPPER LOBE, LEFT BRONCHUS OR LUNG (HCC): ICD-10-CM

## 2024-02-20 PROCEDURE — 74160 CT ABDOMEN W/CONTRAST: CPT

## 2024-02-20 PROCEDURE — 6360000004 HC RX CONTRAST MEDICATION: Performed by: RADIOLOGY

## 2024-02-20 PROCEDURE — 71260 CT THORAX DX C+: CPT

## 2024-02-20 NOTE — TELEPHONE ENCOUNTER
Medication Refill Request    Bimal Chavira Sr is requesting a refill of the following medication(s):   Metformin   needs a one week supply  Please send refill to:     [id number] - [place], [place] - [address] [phone] - F [phone] [address] [address]  Phone: [phone] Fax: [phone]

## 2024-02-20 NOTE — TELEPHONE ENCOUNTER
Patient's wife call requesting 1 week supply of metformin be sent to local pharmacy ([facility]) as shipment from mail order pharmacy has not arrived yet.     Called and spoke with patient's PCP office and requested they [facility]  Staff confirmed they will send refill request to patient today.     Returned call to patient's wife and informed that PCP office stated they can send this refill/short supply for her. She verbalized understanding.

## 2024-02-21 ENCOUNTER — TELEPHONE (OUTPATIENT)
Age: 79
End: 2024-02-21

## 2024-02-22 ENCOUNTER — ANTI-COAG VISIT (OUTPATIENT)
Age: 79
End: 2024-02-22

## 2024-02-22 LAB — INR BLD: 1.9

## 2024-02-22 NOTE — PROGRESS NOTES
ADVANCED HEART FAILURE CENTER   in [place], [place]      INR result reviewed with BERTA Kaplan NP who made the following recommendations (VORB): Take 7.5mg tonight, then resume maintenance plan dosing and recheck in 1 week. Patient's wife notified and verbalized understanding. They had no further questions. (See anticoag tracker)     Kaylin Conte RN

## 2024-02-28 ENCOUNTER — TELEPHONE (OUTPATIENT)
Age: 79
End: 2024-02-28

## 2024-02-29 ENCOUNTER — ANTI-COAG VISIT (OUTPATIENT)
Age: 79
End: 2024-02-29

## 2024-02-29 DIAGNOSIS — R06.02 SHORTNESS OF BREATH: ICD-10-CM

## 2024-02-29 DIAGNOSIS — Z01.89 ROUTINE LAB DRAW: ICD-10-CM

## 2024-02-29 DIAGNOSIS — I50.20 HFREF (HEART FAILURE WITH REDUCED EJECTION FRACTION) (HCC): ICD-10-CM

## 2024-02-29 DIAGNOSIS — I50.22 CHRONIC SYSTOLIC HEART FAILURE (HCC): ICD-10-CM

## 2024-02-29 DIAGNOSIS — Z79.01 CHRONIC ANTICOAGULATION: Primary | ICD-10-CM

## 2024-02-29 LAB — INR BLD: 2.3

## 2024-02-29 NOTE — PROGRESS NOTES
ADVANCED HEART FAILURE CENTER  [facility] in [place], [place]    INR result reviewed with RAYMON Hughes NP who made the following recommendations (VORB): Continue current coumadin maintenance dosing and recheck INR with full set of labs 3/6/24. Patient's wife notified. She stated patient has been taking 7.5mg Fridays, 5mg all other days for an extended period of time. Reviewed with RAYMON Hughes NP who recommended VORB maintenance dosing be changed to reflect how patient has been taking, continue 7.5mg coumadin Fridays, 5mg all other days and recheck with full set 3/6/24 as planned. Patient's wife verbalized understanding and had no further questions. (See anticoag tracker).    ZANDRE COYLE RN

## 2024-03-05 ENCOUNTER — TELEPHONE (OUTPATIENT)
Age: 79
End: 2024-03-05

## 2024-03-05 NOTE — TELEPHONE ENCOUNTER
Telephone Call RE:  Lab Reminder      Outcome:     [] Patient verbalizes understanding    [] Unable to reach   [x] Left message              []       Cosme Pickens

## 2024-03-05 NOTE — TELEPHONE ENCOUNTER
Patient's wife called requesting clarification on when patient should go to lab to have labwork done.     Returned call to patient's wife and [facility]

## 2024-03-06 LAB
BASOPHILS # BLD AUTO: 0 X10E3/UL (ref 0–0.2)
BASOPHILS NFR BLD AUTO: 0 %
EOSINOPHIL # BLD AUTO: 0 X10E3/UL (ref 0–0.4)
EOSINOPHIL NFR BLD AUTO: 1 %
ERYTHROCYTE [DISTWIDTH] IN BLOOD BY AUTOMATED COUNT: 15.1 % (ref 11.6–15.4)
HCT VFR BLD AUTO: 43.3 % (ref 37.5–51)
HGB BLD-MCNC: 13.4 G/DL (ref 13–17.7)
IMM GRANULOCYTES # BLD AUTO: 0 X10E3/UL (ref 0–0.1)
IMM GRANULOCYTES NFR BLD AUTO: 0 %
LYMPHOCYTES # BLD AUTO: 0.5 X10E3/UL (ref 0.7–3.1)
LYMPHOCYTES NFR BLD AUTO: 18 %
MCH RBC QN AUTO: 25 PG (ref 26.6–33)
MCHC RBC AUTO-ENTMCNC: 30.9 G/DL (ref 31.5–35.7)
MCV RBC AUTO: 81 FL (ref 79–97)
MONOCYTES # BLD AUTO: 0.4 X10E3/UL (ref 0.1–0.9)
MONOCYTES NFR BLD AUTO: 12 %
NEUTROPHILS # BLD AUTO: 1.9 X10E3/UL (ref 1.4–7)
NEUTROPHILS NFR BLD AUTO: 69 %
PLATELET # BLD AUTO: 190 X10E3/UL (ref 150–450)
RBC # BLD AUTO: 5.36 X10E6/UL (ref 4.14–5.8)
WBC # BLD AUTO: 2.8 X10E3/UL (ref 3.4–10.8)

## 2024-03-07 ENCOUNTER — TELEPHONE (OUTPATIENT)
Age: 79
End: 2024-03-07

## 2024-03-07 ENCOUNTER — ANTI-COAG VISIT (OUTPATIENT)
Age: 79
End: 2024-03-07

## 2024-03-07 DIAGNOSIS — Z79.01 CHRONIC ANTICOAGULATION: Primary | ICD-10-CM

## 2024-03-07 LAB
ALBUMIN SERPL-MCNC: 4.4 G/DL (ref 3.8–4.8)
ALBUMIN/GLOB SERPL: 1.6 {RATIO} (ref 1.2–2.2)
ALP SERPL-CCNC: 152 IU/L (ref 44–121)
ALT SERPL-CCNC: 5 IU/L (ref 0–44)
AST SERPL-CCNC: 34 IU/L (ref 0–40)
BILIRUB SERPL-MCNC: 0.6 MG/DL (ref 0–1.2)
BUN SERPL-MCNC: 16 MG/DL (ref 8–27)
BUN/CREAT SERPL: 13 (ref 10–24)
CALCIUM SERPL-MCNC: 9.3 MG/DL (ref 8.6–10.2)
CHLORIDE SERPL-SCNC: 99 MMOL/L (ref 96–106)
CO2 SERPL-SCNC: 23 MMOL/L (ref 20–29)
CREAT SERPL-MCNC: 1.22 MG/DL (ref 0.76–1.27)
EGFRCR SERPLBLD CKD-EPI 2021: 61 ML/MIN/1.73
GLOBULIN SER CALC-MCNC: 2.7 G/DL (ref 1.5–4.5)
GLUCOSE SERPL-MCNC: 104 MG/DL (ref 70–99)
INR PPP: 1.7 (ref 0.9–1.2)
LDH SERPL L TO P-CCNC: 169 IU/L (ref 121–224)
MAGNESIUM SERPL-MCNC: 2.2 MG/DL (ref 1.6–2.3)
NT-PROBNP SERPL-MCNC: 929 PG/ML (ref 0–486)
POTASSIUM SERPL-SCNC: 4.1 MMOL/L (ref 3.5–5.2)
PROT SERPL-MCNC: 7.1 G/DL (ref 6–8.5)
PROTHROMBIN TIME: 17.4 SEC (ref 9.1–12)
SODIUM SERPL-SCNC: 139 MMOL/L (ref 134–144)

## 2024-03-07 NOTE — TELEPHONE ENCOUNTER
----- Message from SHAQ Tirado NP sent at 3/7/2024  4:15 PM EST -----  No significant new concerns on labs.  No new changes at this time.

## 2024-03-07 NOTE — PROGRESS NOTES
ADVANCED HEART FAILURE CENTER  [facility] in [place], [place]      INR result reviewed with BERTA Kaplan NP who made the following recommendations (VORB): 7.5mg for 3 days and recheck Thursday. Notified labs with in baseline per provider, (see lab call) Patient's wife notified of provider instructions via voicemail, requested call back to confirm     Patient's wife returned call, confirmed she received instructions.    (See anticoag tracker)     Ellen Monge RN

## 2024-03-11 ENCOUNTER — TELEPHONE (OUTPATIENT)
Age: 79
End: 2024-03-11

## 2024-03-11 NOTE — TELEPHONE ENCOUNTER
Patient's wife called requesting refill Entresto, Jardiance, flomax and trazadone. ([facility]  Informed patient's wife that [facility] manages Jardiance and Entresto however refills for flomax and trazodone are not written by [facility] as they are non cardiac meds.     Patient's wife confirmed patient is taking Entresto 49-51 BID and Jardiance 10mg daily. States patient is not out of medication and has at least 1 week left of both. Patient's wife confirmed she will call [facility] pharmacy to request refills on flomax and trazadone from the appropriate provider. Secure email sent to [facility] coordinator notifying that patient is requesting refill on Entresto and Jardiance through their pharmacy.

## 2024-03-12 ENCOUNTER — TELEPHONE (OUTPATIENT)
Age: 79
End: 2024-03-12

## 2024-03-13 ENCOUNTER — TELEPHONE (OUTPATIENT)
Age: 79
End: 2024-03-13

## 2024-03-13 DIAGNOSIS — I50.20 HFREF (HEART FAILURE WITH REDUCED EJECTION FRACTION) (HCC): Primary | ICD-10-CM

## 2024-03-13 NOTE — TELEPHONE ENCOUNTER
1546: Received call back from patient's wife who stated that patient has been taking 20mg of Lipitor daily (1/2 of 40mg tablet). [facility] medication list states dose is 40 mg.

## 2024-03-13 NOTE — TELEPHONE ENCOUNTER
[facility] from [facility] LV[facility] requesting clarification on patient's bumex, potassium and Lipitor dosing. Per [facility] coordinator [facility] records reflect patient taking bumex 2 mg daily, KCl 40mEq BID and atorvastatin 20 mg daily, while our records reflect patient taking  bumex 1 mg daily, KCL 40mEq daily, atorvastatin 40 mg daily.     1029: Called patient's wife to clarify medication dosing. Patient's wife confirmed patient is taking 1mg of bumex daily and 40meq of potassium daily (consistent with [facility] notes and med list). States she is unsure of the Lipitor dosing. Requested patient's wife call back when she is back home to clarify Lipitor dosing. She verbalized understanding, states she will check dosing and return call to [facility].      Patient's wife states that she is concerned patient has lost weight. States patient reported that he lost about 10lb over a period of several days. States patient reported to her one episode of diarrhea but no other GI upset. States that patient appears to be eating normally. Denies LVAD alarms.  Has not reported any other symptoms to wife.     Reviewed with BERTA Kaplan who stated VORB will address recent weight loss at upcoming appointment on 03/21. No new orders at this time.

## 2024-03-15 ENCOUNTER — ANTI-COAG VISIT (OUTPATIENT)
Age: 79
End: 2024-03-15

## 2024-03-15 ENCOUNTER — TELEPHONE (OUTPATIENT)
Age: 79
End: 2024-03-15

## 2024-03-15 DIAGNOSIS — Z79.01 CHRONIC ANTICOAGULATION: Primary | ICD-10-CM

## 2024-03-15 LAB — INR BLD: 2.1

## 2024-03-15 RX ORDER — ATORVASTATIN CALCIUM 40 MG/1
20 TABLET, FILM COATED ORAL DAILY
Qty: 30 TABLET | Refills: 0
Start: 2024-03-15 | End: 2024-03-15

## 2024-03-15 RX ORDER — ATORVASTATIN CALCIUM 40 MG/1
20 TABLET, FILM COATED ORAL DAILY
Qty: 30 TABLET | Refills: 0 | Status: SHIPPED | OUTPATIENT
Start: 2024-03-15

## 2024-03-15 NOTE — TELEPHONE ENCOUNTER
Telephone Call RE:  Lab Reminder      Outcome:     [x] Patient's wife verbalizes understanding    [] Unable to reach   [] Left message              []       Ellen Monge RN

## 2024-03-15 NOTE — PROGRESS NOTES
ADVANCED HEART FAILURE CENTER  [facility] in [place], [place]      INR result reviewed with JORDEN Molina NP  who made the following recommendations (VORB): no changes and recheck 1 week. Patient's wife  notified and verbalized understanding. They had no further questions. (See anticoag tracker)     Ellen Monge RN

## 2024-03-15 NOTE — TELEPHONE ENCOUNTER
Reviewed Lipitor dosing with JORDEN Garrett who stated VORB okay for patient to continue taking 20mg of Lipitor daily, will check lipid levels with next set of labwork. Per provider patient should continue dosing of 1 mg Bumex daily, KCL 40mEq daily.    [facility] corrdinator updated via secure email on medication doses as they were reported by patient's wife. Also notified that per JORDEN Garrett patient should continue taking 1 mg Bumex daily, KCL 40mEq daily, and is okay to continue taking 20mg of Lipitor daily. ([facility]     1259: call placed to patient's wife. Notified patient's wife that per provider patient should continue doses of medication as he has been taking them (1 mg Bumex daily, KCL 40mEq daily, 20mg Lipitor daily), she verbilized understanding .    Requested Prescriptions     Signed Prescriptions Disp Refills    atorvastatin (LIPITOR) 40 MG tablet 30 tablet 0     Sig: Take 0.5 tablets by mouth daily     Authorizing Provider: WILY GARRETT     Ordering User: IFEANYI DAMIAN

## 2024-03-18 ENCOUNTER — TELEPHONE (OUTPATIENT)
Age: 79
End: 2024-03-18

## 2024-03-20 ENCOUNTER — TELEPHONE (OUTPATIENT)
Age: 79
End: 2024-03-20

## 2024-03-20 NOTE — TELEPHONE ENCOUNTER
Telephone Call RE:  Lab Reminder      Outcome:     [] Patient verbalizes understanding    [] Unable to reach   [] Left message              [x]     Pt's wife thought that they were supposed to check INR today b/c they're here tomorrow.     Cosme Pickens

## 2024-03-21 ENCOUNTER — OFFICE VISIT (OUTPATIENT)
Age: 79
End: 2024-03-21
Payer: MEDICARE

## 2024-03-21 VITALS
HEIGHT: 72 IN | RESPIRATION RATE: 18 BRPM | OXYGEN SATURATION: 96 % | BODY MASS INDEX: 27.36 KG/M2 | SYSTOLIC BLOOD PRESSURE: 84 MMHG | WEIGHT: 202 LBS | HEART RATE: 94 BPM

## 2024-03-21 DIAGNOSIS — Z95.811 LVAD (LEFT VENTRICULAR ASSIST DEVICE) PRESENT (HCC): ICD-10-CM

## 2024-03-21 DIAGNOSIS — I50.20 HFREF (HEART FAILURE WITH REDUCED EJECTION FRACTION) (HCC): Primary | ICD-10-CM

## 2024-03-21 PROCEDURE — G8484 FLU IMMUNIZE NO ADMIN: HCPCS | Performed by: NURSE PRACTITIONER

## 2024-03-21 PROCEDURE — G8427 DOCREV CUR MEDS BY ELIG CLIN: HCPCS | Performed by: NURSE PRACTITIONER

## 2024-03-21 PROCEDURE — 99214 OFFICE O/P EST MOD 30 MIN: CPT | Performed by: NURSE PRACTITIONER

## 2024-03-21 PROCEDURE — 1036F TOBACCO NON-USER: CPT | Performed by: NURSE PRACTITIONER

## 2024-03-21 PROCEDURE — G8419 CALC BMI OUT NRM PARAM NOF/U: HCPCS | Performed by: NURSE PRACTITIONER

## 2024-03-21 PROCEDURE — 1123F ACP DISCUSS/DSCN MKR DOCD: CPT | Performed by: NURSE PRACTITIONER

## 2024-03-21 RX ORDER — DIGOXIN 125 MCG
125 TABLET ORAL EVERY EVENING
Qty: 30 TABLET | Refills: 3
Start: 2024-03-21

## 2024-03-21 ASSESSMENT — PATIENT HEALTH QUESTIONNAIRE - PHQ9
SUM OF ALL RESPONSES TO PHQ QUESTIONS 1-9: 0
1. LITTLE INTEREST OR PLEASURE IN DOING THINGS: NOT AT ALL
2. FEELING DOWN, DEPRESSED OR HOPELESS: NOT AT ALL
SUM OF ALL RESPONSES TO PHQ9 QUESTIONS 1 & 2: 0

## 2024-03-21 ASSESSMENT — ENCOUNTER SYMPTOMS
CHEST TIGHTNESS: 0
SORE THROAT: 0
COUGH: 0
ABDOMINAL PAIN: 0
SHORTNESS OF BREATH: 0
EYE PAIN: 0
BLOOD IN STOOL: 0
ABDOMINAL DISTENTION: 0

## 2024-03-21 NOTE — PROGRESS NOTES
ADVANCED HEART FAILURE CENTER  [facility] in [place], [place]  LVAD Coordinator Clinic Visit Note  Chief Complaint   Patient presents with    Shortness of Breath    Leg Swelling    Follow-up     VAD       BP (!) 84/0 (Site: Right Upper Arm, Position: Sitting, Cuff Size: Medium Adult) Comment: MAP  Pulse 94   Resp 18   Ht 1.829 m (6')   Wt 91.6 kg (202 lb)   SpO2 96%   BMI 27.40 kg/m²     LVAD  LVAD Type:: Left Ventricular Assist Device (LVAD)  Pump Speed (rpm): 4800  Pump Flow (lpm): 3.7  MAP (mmHg): 84  Set Low Speed (rpm): 4800  Pump Pulse Index (PI): 7.5  Pump Power (Ji): 3.4  Backup Controller Present: Yes  Driveline Dressing: Clean, Dry and Intact  Outpatient: Yes  MAP in Therapeutic Range (Outpatient): Yes         Bimal Chavira Sr is a 79 y.o. male with a history of ICM with Heartmate 3 implant date of 04/05/22. LVAD interrogation completed in clinic, results reported to provider, Notable for PI events .     Patient denied s/s of driveline infection or driveline trauma (including  drops). States he had one alarm while bending over.  See flow sheet for details. Driveline inspected for integrity.  Patient notes he has lost about 10 pounds recently, states he is eating a drinking well, denies loss of appetite, wife states he asks for smaller portions because he does not want to gain weight. Above reported to provider.      All orders entered per VORB. All provider instructions placed in AVS and reviewed with patient. LVAD education provided on coumadin instructions- see AVS. Educated patient on digoxin, s/s of dig toxicity, expected follow up.  Time given to ask questions. Patient verbalized understanding and had no further questions.     Ellen Monge RN  VAD Coordinator      
Activity: Insufficiently Active (11/7/2023)    Exercise Vital Sign     Days of Exercise per Week: 2 days     Minutes of Exercise per Session: 20 min   Housing Stability: Unknown (11/7/2023)    Housing Stability Vital Sign     Unstable Housing in the Last Year: No       LABORATORY RESULTS:  Failed to redirect to the Timeline version of the [facility] [[email or web address]] SmartLink.    ALLERGY:  Allergies   Allergen Reactions    Oxycodone Anaphylaxis    Ace Inhibitors     Beta Adrenergic Blockers     Penicillins Hives    Spironolactone      Other reaction(s): Gynecomastia        CURRENT MEDICATIONS:    Current Outpatient Medications:     digoxin (LANOXIN) 125 MCG tablet, Take 1 tablet by mouth every evening, Disp: 30 tablet, Rfl: 3    atorvastatin (LIPITOR) 40 MG tablet, Take 0.5 tablets by mouth daily, Disp: 30 tablet, Rfl: 0    metFORMIN (GLUCOPHAGE) 500 MG tablet, Take 1 tablet by mouth 2 times daily (with meals), Disp: 180 tablet, Rfl: 2    famotidine (PEPCID) 20 MG tablet, Take 1 tablet by mouth in the morning and 1 tablet in the evening., Disp: 60 tablet, Rfl: 2    hydrALAZINE (APRESOLINE) 25 MG tablet, Take 2 tablets by mouth 3 times daily, Disp: 30 tablet, Rfl: 2    traZODone (DESYREL) 50 MG tablet, Take 0.5 tablets by mouth nightly, Disp: , Rfl:     sacubitril-valsartan (ENTRESTO) 49-51 MG per tablet, Take 1 tablet by mouth 2 times daily, Disp: 10 tablet, Rfl: 0    tamsulosin (FLOMAX) 0.4 MG capsule, Take 1 capsule by mouth daily, Disp: 5 capsule, Rfl: 0    potassium chloride (KLOR-CON M) 10 MEQ extended release tablet, Take 4 tablets by mouth daily, Disp: 120 tablet, Rfl: 2    empagliflozin (JARDIANCE) 10 MG tablet, Take 1 tablet by mouth daily, Disp: 90 tablet, Rfl: 1    acetaminophen (TYLENOL) 500 MG tablet, Take 1 tablet by mouth every 6 hours as needed, Disp: , Rfl:     aspirin 81 MG EC tablet, Take 1 tablet by mouth daily, Disp: , Rfl:     bumetanide (BUMEX) 2 MG tablet, Take 0.5 tablets by mouth daily, Disp: , Rfl:     docusate

## 2024-03-21 NOTE — PATIENT INSTRUCTIONS
Medication changes:    START digoxin 125mcg daily- take in the evening-Take magnesium at least 2 hours apart from digoxin.     Testing Ordered:    None today     Other Recommendations:     Continue to change drive line exit site dressing 3 times a week using sterile technique,     Ensure you are drinking an adequate amount of water with a goal of 6-8 eight ounce glasses (1.5-2 liters) of fluid daily. Your urine should be clear and light yellow straw colored.       Monthly LVAD Education Tip:    Contacting the VAD Pager    There may be times when you need to reach the VAD team outside of normal clinic hours. A member of the VAD team is available for urgent and emergent needs after hours and any time the clinic is closed. It is important for you to understand when and how to contact the VAD team to ensure you receive the care you need. Remember, the VAD pager is reserved for urgent and emergent needs related to your LVAD/Heart Failure and should only be utilized in appropriate situations. If you are experiencing a true emergency of any nature, you should ALWAYS CALL 911 FIRST!    How to contact the VAD Pager:  Dial the pager number ([phone]) on your phone.   When prompted enter in your callback number followed by the # sign.   A member of the LVAD team will return your call.     When to contact the VAD Pager:  \"Red Heart\" Alarm with loss of green arrows (Pump is no longer running) AFTER you have called 911  Persistent \"Red Heart\" Alarms with green arrows illuminated  Check Flow and PI prior to calling  Acute change in heart failure symptoms:  Sudden onset shortness of breath  Persistent dizziness, lightheadedness leading to passing out or nearly passing out  Falls of any type  LVAD equipment issues that cannot wait until normal clinic hours (over the weekend):  MPU damaged or not working  Battery charger not working  Damage to     Situations NOT appropriate for utilizing the pager:  Refills- It

## 2024-03-22 ENCOUNTER — ANTI-COAG VISIT (OUTPATIENT)
Age: 79
End: 2024-03-22

## 2024-03-22 DIAGNOSIS — Z79.01 CHRONIC ANTICOAGULATION: Primary | ICD-10-CM

## 2024-03-22 LAB — INR BLD: 2

## 2024-03-22 NOTE — PROGRESS NOTES
ADVANCED HEART FAILURE CENTER  [facility] in [place], [place]      INR result reviewed with RAYMON Hughes NP who made the following recommendations (VORB): no changes and recheck 1 week. Patient's wife notified and verbalized understanding. They had no further questions. (See anticoag tracker)     Ellen Monge RN

## 2024-03-27 ENCOUNTER — TELEPHONE (OUTPATIENT)
Age: 79
End: 2024-03-27

## 2024-03-27 NOTE — TELEPHONE ENCOUNTER
1141: call received from patient's wife who stated she has not received [facility] digoxin [facility] the [facility] for [facility].  Informed patient's wife will reach out to VA coordinator to notify that patient has not received this.

## 2024-03-28 ENCOUNTER — ANTI-COAG VISIT (OUTPATIENT)
Age: 79
End: 2024-03-28
Payer: MEDICARE

## 2024-03-28 DIAGNOSIS — Z79.01 CHRONIC ANTICOAGULATION: ICD-10-CM

## 2024-03-28 DIAGNOSIS — Z79.899 ENCOUNTER FOR MONITORING DIURETIC THERAPY: ICD-10-CM

## 2024-03-28 DIAGNOSIS — R06.02 SHORTNESS OF BREATH: ICD-10-CM

## 2024-03-28 DIAGNOSIS — E83.42 HYPOMAGNESEMIA: ICD-10-CM

## 2024-03-28 DIAGNOSIS — D64.9 ANEMIA, UNSPECIFIED TYPE: ICD-10-CM

## 2024-03-28 DIAGNOSIS — Z79.899 HIGH RISK MEDICATION USE: ICD-10-CM

## 2024-03-28 DIAGNOSIS — I50.22 CHRONIC SYSTOLIC HEART FAILURE (HCC): ICD-10-CM

## 2024-03-28 DIAGNOSIS — E78.2 MIXED HYPERLIPIDEMIA: ICD-10-CM

## 2024-03-28 DIAGNOSIS — Z79.899 OTHER LONG TERM (CURRENT) DRUG THERAPY: ICD-10-CM

## 2024-03-28 DIAGNOSIS — Z51.81 ENCOUNTER FOR MONITORING DIURETIC THERAPY: ICD-10-CM

## 2024-03-28 DIAGNOSIS — Z79.01 CHRONIC ANTICOAGULATION: Primary | ICD-10-CM

## 2024-03-28 DIAGNOSIS — E55.9 VITAMIN D DEFICIENCY, UNSPECIFIED: ICD-10-CM

## 2024-03-28 DIAGNOSIS — Z95.811 LVAD (LEFT VENTRICULAR ASSIST DEVICE) PRESENT (HCC): ICD-10-CM

## 2024-03-28 DIAGNOSIS — Z51.81 ENCOUNTER FOR THERAPEUTIC DRUG LEVEL MONITORING: ICD-10-CM

## 2024-03-28 LAB — INR BLD: 2

## 2024-03-28 PROCEDURE — 93793 ANTICOAG MGMT PT WARFARIN: CPT | Performed by: NURSE PRACTITIONER

## 2024-03-28 NOTE — TELEPHONE ENCOUNTER
Secure email received back from VA coordinator who confirmed digoxin was shipped to patient with delivery date of today (03/28).     1330: left patient's wife voicemail notifying digoxin should arrive today from the [facility].  Requested patient's wife reach out to their pharmacy if not delivered today.      1553: patient's wife returned call confirmed she received message. States she has not checked mail but will call [facility] pharmacy if digoxin does not arrive today.

## 2024-03-28 NOTE — PROGRESS NOTES
ADVANCED HEART FAILURE CENTER  [facility] in [place], [place]      INR result reviewed with JORDEN Molina NP  who made the following recommendations (VORB): no changes and recheck 1 week.     Patient's wife  notified of provider instructions via voicemail, requested call back to confirm     Patient's wife returned call, confirmed she received instructions.     (See anticoag tracker)     Ellen Monge RN

## 2024-04-03 ENCOUNTER — TELEPHONE (OUTPATIENT)
Age: 79
End: 2024-04-03

## 2024-04-05 ENCOUNTER — TELEPHONE (OUTPATIENT)
Age: 79
End: 2024-04-05

## 2024-04-05 ENCOUNTER — ANTI-COAG VISIT (OUTPATIENT)
Age: 79
End: 2024-04-05

## 2024-04-05 DIAGNOSIS — Z79.01 CHRONIC ANTICOAGULATION: Primary | ICD-10-CM

## 2024-04-05 LAB — INR BLD: 1.9

## 2024-04-05 NOTE — PROGRESS NOTES
ADVANCED HEART FAILURE CENTER  [facility] in [place], [place]      INR result reviewed with Dr. Adeel MD  who made the following recommendations (VORB): no changes and recheck 1 week. Patient's wife notified and verbalized understanding. They had no further questions. (See anticoag tracker)     Ellen Monge RN

## 2024-04-05 NOTE — TELEPHONE ENCOUNTER
Call received from patient's wife stating they forgot to do bloodwork at the lab yesterday. Requested patient send POC INR in today and have bloodwork done this afternoon. Patient's wife verbalized understanding, stated patient will send INR and complete labwork today.

## 2024-04-06 LAB
25(OH)D3+25(OH)D2 SERPL-MCNC: 40.5 NG/ML (ref 30–100)
ALBUMIN SERPL-MCNC: 4.3 G/DL (ref 3.8–4.8)
ALBUMIN/GLOB SERPL: 1.8 {RATIO} (ref 1.2–2.2)
ALP SERPL-CCNC: 149 IU/L (ref 44–121)
ALT SERPL-CCNC: 6 IU/L (ref 0–44)
AST SERPL-CCNC: 36 IU/L (ref 0–40)
BILIRUB SERPL-MCNC: 0.4 MG/DL (ref 0–1.2)
BNP SERPL-MCNC: 151.5 PG/ML (ref 0–100)
BUN SERPL-MCNC: 16 MG/DL (ref 8–27)
BUN/CREAT SERPL: 16 (ref 10–24)
CALCIUM SERPL-MCNC: 9.5 MG/DL (ref 8.6–10.2)
CHLORIDE SERPL-SCNC: 103 MMOL/L (ref 96–106)
CHOLEST SERPL-MCNC: 139 MG/DL (ref 100–199)
CO2 SERPL-SCNC: 27 MMOL/L (ref 20–29)
CREAT SERPL-MCNC: 1.02 MG/DL (ref 0.76–1.27)
DIGOXIN SERPL-MCNC: 0.4 NG/ML (ref 0.5–0.9)
EGFRCR SERPLBLD CKD-EPI 2021: 75 ML/MIN/1.73
ERYTHROCYTE [DISTWIDTH] IN BLOOD BY AUTOMATED COUNT: 14.9 % (ref 11.6–15.4)
FERRITIN SERPL-MCNC: 62 NG/ML (ref 30–400)
GLOBULIN SER CALC-MCNC: 2.4 G/DL (ref 1.5–4.5)
GLUCOSE SERPL-MCNC: 105 MG/DL (ref 70–99)
HBA1C MFR BLD: 6.6 % (ref 4.8–5.6)
HCT VFR BLD AUTO: 43.4 % (ref 37.5–51)
HDLC SERPL-MCNC: 52 MG/DL
HGB BLD-MCNC: 13.5 G/DL (ref 13–17.7)
INR PPP: 1.7 (ref 0.9–1.2)
IRON SATN MFR SERPL: 20 % (ref 15–55)
IRON SERPL-MCNC: 58 UG/DL (ref 38–169)
LDH SERPL L TO P-CCNC: 143 IU/L (ref 121–224)
LDLC SERPL CALC-MCNC: 72 MG/DL (ref 0–99)
MAGNESIUM SERPL-MCNC: 2.1 MG/DL (ref 1.6–2.3)
MCH RBC QN AUTO: 25.6 PG (ref 26.6–33)
MCHC RBC AUTO-ENTMCNC: 31.1 G/DL (ref 31.5–35.7)
MCV RBC AUTO: 82 FL (ref 79–97)
PLATELET # BLD AUTO: 198 X10E3/UL (ref 150–450)
POTASSIUM SERPL-SCNC: 4.8 MMOL/L (ref 3.5–5.2)
PROT SERPL-MCNC: 6.7 G/DL (ref 6–8.5)
PROTHROMBIN TIME: 17.7 SEC (ref 9.1–12)
RBC # BLD AUTO: 5.28 X10E6/UL (ref 4.14–5.8)
SODIUM SERPL-SCNC: 142 MMOL/L (ref 134–144)
T3FREE SERPL-MCNC: 3 PG/ML (ref 2–4.4)
T4 FREE SERPL-MCNC: 1.55 NG/DL (ref 0.82–1.77)
TIBC SERPL-MCNC: 288 UG/DL (ref 250–450)
TRIGL SERPL-MCNC: 77 MG/DL (ref 0–149)
TSH SERPL DL<=0.005 MIU/L-ACNC: 0.82 UIU/ML (ref 0.45–4.5)
UIBC SERPL-MCNC: 230 UG/DL (ref 111–343)
URATE SERPL-MCNC: 5.2 MG/DL (ref 3.8–8.4)
VLDLC SERPL CALC-MCNC: 15 MG/DL (ref 5–40)
WBC # BLD AUTO: 3.2 X10E3/UL (ref 3.4–10.8)

## 2024-04-08 ENCOUNTER — TELEPHONE (OUTPATIENT)
Age: 79
End: 2024-04-08

## 2024-04-08 DIAGNOSIS — Z79.899 HIGH RISK MEDICATION USE: ICD-10-CM

## 2024-04-08 DIAGNOSIS — E61.1 IRON DEFICIENCY: Primary | ICD-10-CM

## 2024-04-08 RX ORDER — ALBUTEROL SULFATE 90 UG/1
4 AEROSOL, METERED RESPIRATORY (INHALATION) PRN
OUTPATIENT
Start: 2024-04-08

## 2024-04-08 RX ORDER — DIPHENHYDRAMINE HYDROCHLORIDE 50 MG/ML
50 INJECTION INTRAMUSCULAR; INTRAVENOUS
OUTPATIENT
Start: 2024-04-08

## 2024-04-08 RX ORDER — SODIUM CHLORIDE 9 MG/ML
5-250 INJECTION, SOLUTION INTRAVENOUS PRN
OUTPATIENT
Start: 2024-04-08

## 2024-04-08 RX ORDER — HEPARIN SODIUM (PORCINE) LOCK FLUSH IV SOLN 100 UNIT/ML 100 UNIT/ML
500 SOLUTION INTRAVENOUS PRN
OUTPATIENT
Start: 2024-04-08

## 2024-04-08 RX ORDER — ONDANSETRON 2 MG/ML
8 INJECTION INTRAMUSCULAR; INTRAVENOUS
OUTPATIENT
Start: 2024-04-08

## 2024-04-08 RX ORDER — SODIUM CHLORIDE 0.9 % (FLUSH) 0.9 %
5-40 SYRINGE (ML) INJECTION PRN
OUTPATIENT
Start: 2024-04-08

## 2024-04-08 RX ORDER — SODIUM CHLORIDE 9 MG/ML
INJECTION, SOLUTION INTRAVENOUS CONTINUOUS
OUTPATIENT
Start: 2024-04-08

## 2024-04-08 RX ORDER — EPINEPHRINE 1 MG/ML
0.3 INJECTION, SOLUTION, CONCENTRATE INTRAVENOUS PRN
OUTPATIENT
Start: 2024-04-08

## 2024-04-08 RX ORDER — ACETAMINOPHEN 325 MG/1
650 TABLET ORAL
OUTPATIENT
Start: 2024-04-08

## 2024-04-08 RX ORDER — FAMOTIDINE 10 MG/ML
20 INJECTION, SOLUTION INTRAVENOUS
OUTPATIENT
Start: 2024-04-08

## 2024-04-08 NOTE — TELEPHONE ENCOUNTER
Labwork reviewed with JORDEN Molina who initially stated to have patient increase digoxin. Clarified with provider that patient has not been on digoxin for a full two weeks as medication arrived late. Provider then stated VORB have patient continue current digoxin dose and recheck level in 1 week.     JORDEN Molina also stated VORB order venofer infusions 200mg x2 doses.    Called and spoke with patient's wife.  Notified of order for infusions and that infusion center should reach out to patient or her to schedule. She verbalized understanding, also notified of order for digoxin level recheck in 1 week, she verbalized understanding.    stated

## 2024-04-08 NOTE — TELEPHONE ENCOUNTER
----- Message from SHAQ Edwards NP sent at 4/8/2024  1:04 PM EDT -----  Regarding: FW: digoxin level  Lets just repeat in a week then.  ----- Message -----  From: Ellen Monge RN  Sent: 4/8/2024  12:54 PM EDT  To: SHAQ Edwards NP  Subject: FW: digoxin level                                Sounds good, he did get the dig a little late so has only been on it about a week, do you still want him to increase?   ----- Message -----  From: Daylin Molina APRN - NP  Sent: 4/8/2024   8:58 AM EDT  To: Ellen Monge RN  Subject: digoxin level                                    Digoxin level low.  Please call patient and advise to take 250mg T, Th, Sat.  125mg all other evenings.  Recheck digoxin level in 1 week.  Ferritin also low (<100).  Please set up for Venofer 200 mg x 2 doses for iron deficiency with heart failure.  ----- Message -----  From: Soy Garcia Incoming Amb Ref Lab Orders To [facility]  Sent: 4/6/2024   6:37 AM EDT  To: SHAQ Edwards NP

## 2024-04-10 ENCOUNTER — TELEPHONE (OUTPATIENT)
Age: 79
End: 2024-04-10

## 2024-04-12 ENCOUNTER — TELEPHONE (OUTPATIENT)
Age: 79
End: 2024-04-12

## 2024-04-12 DIAGNOSIS — Z79.899 HIGH RISK MEDICATION USE: Primary | ICD-10-CM

## 2024-04-12 DIAGNOSIS — Z79.01 CHRONIC ANTICOAGULATION: ICD-10-CM

## 2024-04-12 NOTE — TELEPHONE ENCOUNTER
Telephone Call RE:  Lab Reminder      Outcome:    INR due yesterday    Called and spoke with patient who stated he tried to take his INR yesterday but received an error message and has no more strips. Reviewed patient due to go to lab on Monday for dig level and will request order for INR from provider to be performed by the lab. Patient verbalized understanding, states he will call [employer] regarding error with meter.      Ellen Monge RN

## 2024-04-15 DIAGNOSIS — Z79.899 HIGH RISK MEDICATION USE: ICD-10-CM

## 2024-04-15 DIAGNOSIS — Z79.01 CHRONIC ANTICOAGULATION: ICD-10-CM

## 2024-04-16 ENCOUNTER — TELEPHONE (OUTPATIENT)
Age: 79
End: 2024-04-16

## 2024-04-17 ENCOUNTER — ANTI-COAG VISIT (OUTPATIENT)
Age: 79
End: 2024-04-17

## 2024-04-17 DIAGNOSIS — Z79.01 CHRONIC ANTICOAGULATION: Primary | ICD-10-CM

## 2024-04-17 LAB
DIGOXIN SERPL-MCNC: <0.4 NG/ML (ref 0.5–0.9)
INR PPP: 1.7 (ref 0.9–1.2)
PROTHROMBIN TIME: 18.1 SEC (ref 9.1–12)

## 2024-04-17 NOTE — PATIENT INSTRUCTIONS
more?  Go to [email or web address]  Enter V843 in the search box to learn more about \"Low Sodium Diet (2,000 Milligram): Care Instructions.\"  Current as of: December 17, 2020               Content Version: 13.0  © 9656-4936 [employer].   Care instructions adapted under license by [employer] (which disclaims liability or warranty for this information). If you have questions about a medical condition or this instruction, always ask your healthcare professional. [employer] disclaims any warranty or liability for your use of this information.      Travel Tips and Preparation:  Traveling with your device is generally easy and safe.     Here are some things to know or consider:  First, consult with your doctor.  It’s important to notify your care team about your plans, so they can:  Help you connect with a doctor at your destination, just in case you need care  Help you plan for the right level of exercise or activity while you’re away  Create a travel and emergency action plan for long-distance trips  Talk with you about travel safety rules for equipment  Help you think through how to manage any possible issues  Provide any other relevant information  Your VAD team will send records to the LVAD center nearest to your destination in case of emergency. The contact information for that center will be provided to you.     What to bring with you  Pack any medications. Bring about a week’s more than you expect to need for the trip. If traveling by plane, pack your medication in your carry-on so it’s with you for the entire journey.  Bring a photocopy of the prescriptions your doctor wrote, along with any related insurance or pharmacy information so you can get prescriptions filled while you’re away.  Ensure you have all of your LVAD Equipment (see travel checklist below). For any overnight trip, it is important to ensure you have your mobile power unit (or power

## 2024-04-17 NOTE — PROGRESS NOTES
ADVANCED HEART FAILURE CENTER  [facility] in [place], [place]      INR result reviewed with BERTA Kaplan NP who made the following recommendations (VORB): 7.5mg tonight and recheck tomorrow. Patient's wife notified and verbalized understanding. They had no further questions. (See anticoag tracker)     Ellen Monge RN

## 2024-04-18 ENCOUNTER — OFFICE VISIT (OUTPATIENT)
Age: 79
End: 2024-04-18

## 2024-04-18 ENCOUNTER — ANTI-COAG VISIT (OUTPATIENT)
Age: 79
End: 2024-04-18

## 2024-04-18 VITALS
OXYGEN SATURATION: 97 % | HEIGHT: 72 IN | WEIGHT: 211 LBS | RESPIRATION RATE: 18 BRPM | SYSTOLIC BLOOD PRESSURE: 88 MMHG | HEART RATE: 98 BPM | BODY MASS INDEX: 28.58 KG/M2

## 2024-04-18 DIAGNOSIS — Z79.899 HIGH RISK MEDICATION USE: ICD-10-CM

## 2024-04-18 DIAGNOSIS — I25.10 ATHEROSCLEROSIS OF NATIVE CORONARY ARTERY OF NATIVE HEART WITHOUT ANGINA PECTORIS: ICD-10-CM

## 2024-04-18 DIAGNOSIS — I10 ESSENTIAL HYPERTENSION: ICD-10-CM

## 2024-04-18 DIAGNOSIS — I50.22 CHRONIC SYSTOLIC HEART FAILURE (HCC): Primary | ICD-10-CM

## 2024-04-18 DIAGNOSIS — I25.5 ISCHEMIC CARDIOMYOPATHY: ICD-10-CM

## 2024-04-18 DIAGNOSIS — Z79.01 CHRONIC ANTICOAGULATION: ICD-10-CM

## 2024-04-18 DIAGNOSIS — Z95.811 LVAD (LEFT VENTRICULAR ASSIST DEVICE) PRESENT (HCC): ICD-10-CM

## 2024-04-18 LAB
DISTANCE WALKED: NORMAL
INR BLD: 1.6
SPO2: NORMAL

## 2024-04-18 RX ORDER — DIGOXIN 125 MCG
250 TABLET ORAL EVERY EVENING
Qty: 30 TABLET | Refills: 3 | Status: SHIPPED | OUTPATIENT
Start: 2024-04-18

## 2024-04-18 NOTE — PROGRESS NOTES
Bimal Chavira  is a 79 y.o. male with a history of ICM with Heartmate 3 implant date of 04/05/2022. Patient was seen in clinic for routine follow up at which time it was noted back up battery was expiring. Back up battery in  replaced to ensure safe operation of device per  guidelines.

## 2024-04-18 NOTE — PROGRESS NOTES
ADVANCED HEART FAILURE CENTER  LVAD Annual Visit  LVAD Coordinator Clinic Visit Note  Chief Complaint   Patient presents with    Follow-up     Annual VAD       BP (!) 88/0 (Site: Left Upper Arm, Position: Sitting, Cuff Size: Medium Adult)   Pulse 98   Resp 18   Ht 1.829 m (6')   Wt 95.7 kg (211 lb)   SpO2 97%   BMI 28.62 kg/m²     LVAD  LVAD Type:: Left Ventricular Assist Device (LVAD)  Pump Speed (rpm): 4800  Pump Flow (lpm): 3.8  MAP (mmHg): 88  Set Low Speed (rpm): 4800  Pump Pulse Index (PI): 6.2  Pump Power (Ji): 3.3  Battery Life Checked: Yes  Backup Controller Present: Yes  Driveline Dressing: Clean, Dry and Intact  Outpatient: Yes  MAP in Therapeutic Range (Outpatient): Yes     Bimal Chavira Sr is a 79 y.o. male with a history of ICM with Heartmate 3 implant date of 04/05/2022. LVAD interrogation completed in clinic, results reported to provider, Notable for one low flow, (patient states he was bending over), PI events.     Patient denied s/s of driveline infection or driveline trauma (including  drops). See flow sheet for details. Driveline inspected for integrity.  Above reported to provider.      Completed annual periodic maintenance on patients own LVAD Mobile Power Unit and Universal Battery Charger. Mobile Power Unit AA batteries were replaced per  guidelines. Please see further documentation in equipment log.      EKG, six minute walk ordered per Dr. Adeel HASSAN. Six minute walk and annual Intermacs survey completed in clinic.     Annual  exchange competency dicussed with patient's caregiver Katt.     The following education was provided to patient at visit today:  -Mobile power unit maintenance: Changing AA batteries twice yearly, monitoring cords for damage  -Back up : Charging back up battery twice yearly  -14v lithium ion battery maintenance: Battery calibration. Hand out provided  -Daily LVAD maintenance: Performing daily

## 2024-04-18 NOTE — PROGRESS NOTES
ADVANCED HEART FAILURE CENTER  [facility] in [place], [place]  Mechanical Circulatory Support Clinic Note    Patient name: Bimal Chavira Sr  Patient : 1945  Patient MRN: [id number]  Date of service: 24    Primary care physician: Shruthi Valles MD  LVAD cardiologist: JEFF    CHIEF COMPLAINT:  Follow up for heart failure, LVAD, anticoagulation management    ASSESSMENT:  Bimal Chavira Sr is a 79 y.o. 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    PLAN:  Heart Failure/Cardiomyopathy: NYHA class II  Continue current medical therapy for heart failure:  Beta-blocker: Intolerant due to RV failure  Increase Digoxin to 0.25 mg daily  ACE/ARB/ARNi: Continue Entresto 49-51 mg BID  Hydralazine/Nitrate: continue Hydralazine 50 mg TID  MRA: Continue Eplerenone 25 mg daily  SGLT2i: ContinueJardiance 10 mg daily  Diuretic: Bumex 1 mg daily  Schedule IV iron infusion based on recent labs, Ferritin 62  Reinforced low salt diet  Reinforced fluid restriction to 6 x 8oz glasses per day  Recommended aerobic exercise 30 minutes 5 days weekly     LVAD/Anticoagulation:  Continue current device speed at 4800 RPM.   Echo 2024 reviewed  No signs of bleeding, infection or stroke.   Continue dressing changes three times weekly  Chronic anticoagulation with coumadin, followed by LVAD RN  Continue ASA 81 mg daily    Routine LVAD/anticoagulation labs: Labs  reviewed.   6MW today-385 m     CAD:  No anginal symptoms  Continue Aspirin     Arrhythmia/ICD:  Tachy therapies deactivated due to malfunctioning lead  Follow-up with EP cardiologist PRN     Hypertension:  Doppler MAP 88, in target range. No medication changes made     SHERI:  Continue CPAP therapy. Discussed importance of compliance and association of untreated sleep apnea with symptoms of tiredness and

## 2024-04-18 NOTE — PROGRESS NOTES
ADVANCED HEART FAILURE CENTER  [facility] in [place], [place]      INR result reviewed with Dr. Adeel MD  who made the following recommendations (VORB): 7.5mg tonight and tomorrow and recheck Monday. Patient's wife  notified and verbalized understanding. They had no further questions. (See anticoag tracker)     Ellen Monge RN

## 2024-04-19 ENCOUNTER — TELEPHONE (OUTPATIENT)
Age: 79
End: 2024-04-19

## 2024-04-22 ENCOUNTER — ANTI-COAG VISIT (OUTPATIENT)
Age: 79
End: 2024-04-22
Payer: MEDICARE

## 2024-04-22 DIAGNOSIS — Z79.01 CHRONIC ANTICOAGULATION: Primary | ICD-10-CM

## 2024-04-22 LAB — INR BLD: 2.3

## 2024-04-22 PROCEDURE — 93793 ANTICOAG MGMT PT WARFARIN: CPT | Performed by: NURSE PRACTITIONER

## 2024-04-22 NOTE — PROGRESS NOTES
ADVANCED HEART FAILURE CENTER  [facility] in [place], [place]      INR result reviewed with BERTA Kaplan NP who made the following recommendations (VORB): no changes and recheck 1 week. Patient's wife notified and verbalized understanding. They had no further questions. (See anticoag tracker)     Ellen Monge RN

## 2024-04-24 ENCOUNTER — TELEPHONE (OUTPATIENT)
Age: 79
End: 2024-04-24

## 2024-04-25 DIAGNOSIS — Z79.899 HIGH RISK MEDICATION USE: ICD-10-CM

## 2024-04-26 ENCOUNTER — TELEPHONE (OUTPATIENT)
Age: 79
End: 2024-04-26

## 2024-04-26 LAB — DIGOXIN SERPL-MCNC: 0.6 NG/ML (ref 0.5–0.9)

## 2024-04-26 NOTE — TELEPHONE ENCOUNTER
----- Message from SHAQ Tirado NP sent at 4/26/2024 12:23 PM EDT -----  Looks good to me.  Continue with the increased dose.      ----- Message -----  From: Ellen Monge RN  Sent: 4/26/2024  11:50 AM EDT  To: SHAQ Tirado NP    In Dr. Denis's box

## 2024-04-26 NOTE — TELEPHONE ENCOUNTER
Reviewed dig level with RAYMON Hughes who stated VORB continue current dig dosing.      Called and spoke with patient's wife. Notified of current level and provider instructions to continue 2 tablets of digoxin daily, reminded to check INR Monday, she verbalized understanding .     Telephone Call RE:  Lab Reminder      Outcome:     [x] Patient's  verbalizes understanding    [] Unable to reach   [] Left message              []       Ellen Monge RN

## 2024-04-27 NOTE — PROGRESS NOTES
[address] in [facility], [address] [place] [address]  LVAD Outpatient Nurse Visit    Chief Complaint   Patient presents with    Other     Nurse visit- abdominal pain          BP (!) (P) 86/0   Pulse (P) 63   Resp (P) 18   SpO2 (P) 96%      LVAD  LVAD Type[de-identified] Left Ventricular Assist Device (LVAD)  Pump Speed (rpm): 4800  Pump Flow (lpm): 3.1  MAP (mmHg): 86  Set Low Speed (rpm): 4800  Pump Pulse Index (PI): 9.9  Pump Power (Reji Seats): 3.3  Backup Controller Present: Yes  Driveline Dressing: Clean, Dry and Intact  Outpatient: Yes       LVAD interrogation completed in clinic, results reported to provider. See flow sheet for details. Olegario Baxter is a 69 y.o. male with a history of ICM with Heartmate 3  implant date of 04/05/22 Patient was seen in clinic for nurse visit for drive line check. Earlier today reported that he is having intermittent sharp pain in his abdomen about 1 inch from driveline which occurs only when he bends over or moves sharply. Endorses some driveline tenderness. Denies driveline trauma (including  drops), drainage, swelling or redness at site. Drive line dressing change completed using sterile technique. Driveline site clean dry and intact, no signs of drainage, redness swelling or other notable abnormality. Medications reviewed    Reviewed all information with Phill East  who recommended V.O.R.B send wound culture. All instructions placed in after visit summary and reviewed with patient. Education provided on when to call back provider, testing ordered. Time given to ask questions. Patient verbalized understanding and had no further questions. Chest wall tenderness

## 2024-04-29 ENCOUNTER — ANTI-COAG VISIT (OUTPATIENT)
Age: 79
End: 2024-04-29
Payer: MEDICARE

## 2024-04-29 DIAGNOSIS — R06.02 SHORTNESS OF BREATH: ICD-10-CM

## 2024-04-29 DIAGNOSIS — Z79.899 HIGH RISK MEDICATION USE: ICD-10-CM

## 2024-04-29 DIAGNOSIS — I50.22 CHRONIC SYSTOLIC HEART FAILURE (HCC): ICD-10-CM

## 2024-04-29 DIAGNOSIS — E83.42 HYPOMAGNESEMIA: ICD-10-CM

## 2024-04-29 DIAGNOSIS — I50.20 HFREF (HEART FAILURE WITH REDUCED EJECTION FRACTION) (HCC): ICD-10-CM

## 2024-04-29 DIAGNOSIS — Z95.811 LVAD (LEFT VENTRICULAR ASSIST DEVICE) PRESENT (HCC): ICD-10-CM

## 2024-04-29 DIAGNOSIS — Z79.01 CHRONIC ANTICOAGULATION: Primary | ICD-10-CM

## 2024-04-29 DIAGNOSIS — Z79.01 CHRONIC ANTICOAGULATION: ICD-10-CM

## 2024-04-29 LAB — INR BLD: 2.2

## 2024-04-29 PROCEDURE — 93793 ANTICOAG MGMT PT WARFARIN: CPT | Performed by: NURSE PRACTITIONER

## 2024-04-29 NOTE — PROGRESS NOTES
ADVANCED HEART FAILURE CENTER  [facility] in [place], [place]      INR result reviewed with RAYMON Hughes NP who made the following recommendations (VORB): no changes and recheck 1 week. Patient's wife notified and verbalized understanding. They had no further questions. (See anticoag tracker)     Ellen Monge RN

## 2024-05-06 ENCOUNTER — TELEPHONE (OUTPATIENT)
Age: 79
End: 2024-05-06

## 2024-05-06 NOTE — TELEPHONE ENCOUNTER
Telephone Call RE:  Lab Reminder      Outcome:    Labs tomorrow- patient states he will go Wednesday      [x] Patient verbalizes understanding    [] Unable to reach   [] Left message              []       Refill request for hydralazine received from [facility]. [facility] Spoke with patient and his wife who stated that they do not need hydralazine refill this week, did not request refill from [facility]. [facility] that [facility] 50mg tablet- one tablet TID instead of 25mg tablet- 2 tablets TID reflected in our system.  Reviewed with BERTA Kaplan who stated VORB okay for patient to take one 50mg tablet TID.      Ellen Monge RN

## 2024-05-07 ENCOUNTER — OFFICE VISIT (OUTPATIENT)
Age: 79
End: 2024-05-07
Payer: MEDICARE

## 2024-05-07 ENCOUNTER — HOSPITAL ENCOUNTER (OUTPATIENT)
Facility: HOSPITAL | Age: 79
Setting detail: INFUSION SERIES
Discharge: HOME | End: 2024-05-07
Payer: MEDICARE

## 2024-05-07 VITALS
TEMPERATURE: 98 F | HEART RATE: 75 BPM | RESPIRATION RATE: 18 BRPM | SYSTOLIC BLOOD PRESSURE: 96 MMHG | DIASTOLIC BLOOD PRESSURE: 60 MMHG

## 2024-05-07 VITALS
RESPIRATION RATE: 16 BRPM | WEIGHT: 206 LBS | DIASTOLIC BLOOD PRESSURE: 68 MMHG | TEMPERATURE: 98.2 F | HEART RATE: 69 BPM | BODY MASS INDEX: 27.9 KG/M2 | OXYGEN SATURATION: 98 % | SYSTOLIC BLOOD PRESSURE: 108 MMHG | HEIGHT: 72 IN

## 2024-05-07 DIAGNOSIS — E11.51 TYPE 2 DIABETES, CONTROLLED, WITH PERIPHERAL CIRCULATORY DISORDER (HCC): Primary | ICD-10-CM

## 2024-05-07 DIAGNOSIS — I50.20 HFREF (HEART FAILURE WITH REDUCED EJECTION FRACTION) (HCC): ICD-10-CM

## 2024-05-07 DIAGNOSIS — R53.83 OTHER FATIGUE: ICD-10-CM

## 2024-05-07 DIAGNOSIS — E61.1 IRON DEFICIENCY: Primary | ICD-10-CM

## 2024-05-07 PROCEDURE — 99214 OFFICE O/P EST MOD 30 MIN: CPT | Performed by: STUDENT IN AN ORGANIZED HEALTH CARE EDUCATION/TRAINING PROGRAM

## 2024-05-07 PROCEDURE — 1123F ACP DISCUSS/DSCN MKR DOCD: CPT | Performed by: STUDENT IN AN ORGANIZED HEALTH CARE EDUCATION/TRAINING PROGRAM

## 2024-05-07 PROCEDURE — 3044F HG A1C LEVEL LT 7.0%: CPT | Performed by: STUDENT IN AN ORGANIZED HEALTH CARE EDUCATION/TRAINING PROGRAM

## 2024-05-07 PROCEDURE — 1036F TOBACCO NON-USER: CPT | Performed by: STUDENT IN AN ORGANIZED HEALTH CARE EDUCATION/TRAINING PROGRAM

## 2024-05-07 PROCEDURE — 3078F DIAST BP <80 MM HG: CPT | Performed by: STUDENT IN AN ORGANIZED HEALTH CARE EDUCATION/TRAINING PROGRAM

## 2024-05-07 PROCEDURE — 96374 THER/PROPH/DIAG INJ IV PUSH: CPT

## 2024-05-07 PROCEDURE — G8419 CALC BMI OUT NRM PARAM NOF/U: HCPCS | Performed by: STUDENT IN AN ORGANIZED HEALTH CARE EDUCATION/TRAINING PROGRAM

## 2024-05-07 PROCEDURE — 2580000003 HC RX 258: Performed by: NURSE PRACTITIONER

## 2024-05-07 PROCEDURE — 3074F SYST BP LT 130 MM HG: CPT | Performed by: STUDENT IN AN ORGANIZED HEALTH CARE EDUCATION/TRAINING PROGRAM

## 2024-05-07 PROCEDURE — G8427 DOCREV CUR MEDS BY ELIG CLIN: HCPCS | Performed by: STUDENT IN AN ORGANIZED HEALTH CARE EDUCATION/TRAINING PROGRAM

## 2024-05-07 PROCEDURE — 6360000002 HC RX W HCPCS: Performed by: NURSE PRACTITIONER

## 2024-05-07 RX ORDER — SODIUM CHLORIDE 9 MG/ML
5-250 INJECTION, SOLUTION INTRAVENOUS PRN
OUTPATIENT
Start: 2024-05-14

## 2024-05-07 RX ORDER — SODIUM CHLORIDE 9 MG/ML
5-250 INJECTION, SOLUTION INTRAVENOUS PRN
Status: DISCONTINUED | OUTPATIENT
Start: 2024-05-07 | End: 2024-05-08 | Stop reason: HOSPADM

## 2024-05-07 RX ORDER — ACETAMINOPHEN 325 MG/1
650 TABLET ORAL
OUTPATIENT
Start: 2024-05-14

## 2024-05-07 RX ORDER — ALBUTEROL SULFATE 90 UG/1
4 AEROSOL, METERED RESPIRATORY (INHALATION) PRN
OUTPATIENT
Start: 2024-05-14

## 2024-05-07 RX ORDER — EPINEPHRINE 1 MG/ML
0.3 INJECTION, SOLUTION INTRAMUSCULAR; SUBCUTANEOUS PRN
OUTPATIENT
Start: 2024-05-14

## 2024-05-07 RX ORDER — DIPHENHYDRAMINE HYDROCHLORIDE 50 MG/ML
50 INJECTION INTRAMUSCULAR; INTRAVENOUS
OUTPATIENT
Start: 2024-05-14

## 2024-05-07 RX ORDER — SODIUM CHLORIDE 0.9 % (FLUSH) 0.9 %
5-40 SYRINGE (ML) INJECTION PRN
Status: DISCONTINUED | OUTPATIENT
Start: 2024-05-07 | End: 2024-05-08 | Stop reason: HOSPADM

## 2024-05-07 RX ORDER — ONDANSETRON 2 MG/ML
8 INJECTION INTRAMUSCULAR; INTRAVENOUS
OUTPATIENT
Start: 2024-05-14

## 2024-05-07 RX ORDER — SODIUM CHLORIDE 0.9 % (FLUSH) 0.9 %
5-40 SYRINGE (ML) INJECTION PRN
OUTPATIENT
Start: 2024-05-14

## 2024-05-07 RX ORDER — SODIUM CHLORIDE 9 MG/ML
INJECTION, SOLUTION INTRAVENOUS CONTINUOUS
OUTPATIENT
Start: 2024-05-14

## 2024-05-07 RX ORDER — HEPARIN 100 UNIT/ML
500 SYRINGE INTRAVENOUS PRN
OUTPATIENT
Start: 2024-05-14

## 2024-05-07 RX ADMIN — SODIUM CHLORIDE 200 MG: 9 INJECTION, SOLUTION INTRAVENOUS at 15:13

## 2024-05-07 RX ADMIN — SODIUM CHLORIDE 30 ML/HR: 9 INJECTION, SOLUTION INTRAVENOUS at 14:43

## 2024-05-07 ASSESSMENT — PATIENT HEALTH QUESTIONNAIRE - PHQ9
6. FEELING BAD ABOUT YOURSELF - OR THAT YOU ARE A FAILURE OR HAVE LET YOURSELF OR YOUR FAMILY DOWN: NOT AT ALL
SUM OF ALL RESPONSES TO PHQ QUESTIONS 1-9: 0
7. TROUBLE CONCENTRATING ON THINGS, SUCH AS READING THE NEWSPAPER OR WATCHING TELEVISION: NOT AT ALL
10. IF YOU CHECKED OFF ANY PROBLEMS, HOW DIFFICULT HAVE THESE PROBLEMS MADE IT FOR YOU TO DO YOUR WORK, TAKE CARE OF THINGS AT HOME, OR GET ALONG WITH OTHER PEOPLE: NOT DIFFICULT AT ALL
1. LITTLE INTEREST OR PLEASURE IN DOING THINGS: NOT AT ALL
4. FEELING TIRED OR HAVING LITTLE ENERGY: NOT AT ALL
5. POOR APPETITE OR OVEREATING: NOT AT ALL
9. THOUGHTS THAT YOU WOULD BE BETTER OFF DEAD, OR OF HURTING YOURSELF: NOT AT ALL
3. TROUBLE FALLING OR STAYING ASLEEP: NOT AT ALL
SUM OF ALL RESPONSES TO PHQ9 QUESTIONS 1 & 2: 0
SUM OF ALL RESPONSES TO PHQ QUESTIONS 1-9: 0
SUM OF ALL RESPONSES TO PHQ QUESTIONS 1-9: 0
2. FEELING DOWN, DEPRESSED OR HOPELESS: NOT AT ALL
8. MOVING OR SPEAKING SO SLOWLY THAT OTHER PEOPLE COULD HAVE NOTICED. OR THE OPPOSITE, BEING SO FIGETY OR RESTLESS THAT YOU HAVE BEEN MOVING AROUND A LOT MORE THAN USUAL: NOT AT ALL
SUM OF ALL RESPONSES TO PHQ QUESTIONS 1-9: 0

## 2024-05-07 ASSESSMENT — PAIN SCALES - GENERAL
PAINLEVEL_OUTOF10: 0
PAINLEVEL_OUTOF10: 0

## 2024-05-07 NOTE — PROGRESS NOTES
[facility] Peds/Adult Note                   Date: May 7, 2024    Name: Bimal Chavira Sr    MRN: [id number]         : 1945    1430 - Patient arrives for Venofer 1 of 2 without acute problems. Please see Epic for complete assessment and education provided.    Vital signs stable throughout and prior to discharge. Patient tolerated procedure well and was discharged without incident. Patient is aware of next [facility] appointment on 2024. Appointment card give to the Patient.      Mr. Cait Gustafson's vitals were reviewed prior to and after treatment.   Patient Vitals for the past 12 hrs:   Temp Pulse Resp BP   24 1600 -- 75 18 96/60   24 1532 -- 70 18 (!) 89/62   24 1512 -- 67 18 92/63   24 1430 98 °F (36.7 °C) 74 18 106/68       Lab results were reviewed.  No results found for this or any previous visit (from the past 12 hour(s)).    Medications given:   Medications Administered         0.9 % sodium chloride infusion Admin Date  2024 Action  New Bag Dose  30 mL/hr Rate  30 mL/hr Route  IntraVENous Administered By  Nedra Perry RN        iron sucrose (VENOFER) 200 mg in sodium chloride 0.9 % 100 mL IVPB Admin Date  2024 Action  New Bag Dose  200 mg Rate  480 mL/hr Route  IntraVENous Administered By  Nedra Perry, ANGELINE            Mr. Cait Gustafson tolerated the infusion, and had no complaints.    Mr. Cait Gustaf[facility]. Discharge Instructions provided to patient, patient verbalized understanding and given a copy of after visit summary.     Future Appointments   Date Time Provider Department Center   2024  1:00 PM PEDS MED INF CHAIR 1 [facility]   2024  1:00 PM Daylin Molina, APRN - NP [facility]   2024 11:00 AM Shruthi Valles MD [facility]       Nedra Perry, ANGELINE  May 7, 2024  4:47 PM

## 2024-05-07 NOTE — PROGRESS NOTES
Bimal Chavira  is a 79 y.o. year old male who presents today (05/07/24) for follow-up.     Assessment & Plan:   1. Type 2 diabetes, controlled, with peripheral circulatory disorder (HCC)  Assessment & Plan:  DM controlled, continue metformin and jardiance  Foot exam today - no deficits. Fungal nails - gets them trimmed every 8 weeks with podiatry  He went to [facility] in Jan, will request records   Orders:  -      DIABETES FOOT EXAM  2. Other fatigue  I encouraged him to do regular exercise for short periods - start with 5-10 minutes. This will help with endurance.   We did discuss this may be a symptoms of his chronic HF    He will continue follow with heart failure teams.   I reviewed A1c, CBC, CMP, Lipid, TSH, iron, ferritin, vitamin D, uric acid from 4/5/24      Health Maintenance   Flu vaccine: 10/19/23  COVID vaccine: 10/23/23  RSV: 1/2024  Tetanus vaccine: 10/2019   Shingles vaccine: UTD  Pneumonia vaccine: up to date  Colon cancer screening: aged out  Hep C: 2021  Lipid: 4/2024  DM: 4/2024  Healthcare decision maker: wife  ACP: on file - wants all life prolonging treatment     RTC: 6 mo AWV    Subjective:   Bimal was seen today for 6 Month Follow-Up    Here today with wife Katt    He has low energy. Going to have iron infusion.    He is not doing any regular physical activity.     DM   - metformin, jardiance 25mg.   - BG not checking  - A1c was 6.6% 4/5/24  - eye Dr Andreas Aponte - 1/2024 - need report    HF on LVAD - HF team at [facility] and  [facility]. Also sees S cardiology    MGUS, hx Lung Ca - Dr Hall at [facility]    SHERI - sleep at [facility]    Elevated PSA - was seeing Uro Dr Stoll at [facility] however he is a poor candidate for interventions so I suggested no longer trending PSA. It was 3.4 11/2023     Review of Systems   All other systems reviewed and are negative.        PMHx    Patient Active Problem List   Diagnosis    Benign neoplasm of colon    Insomnia, unspecified    BPH without obstruction/lower urinary

## 2024-05-09 ENCOUNTER — ANTI-COAG VISIT (OUTPATIENT)
Age: 79
End: 2024-05-09

## 2024-05-09 DIAGNOSIS — Z79.01 CHRONIC ANTICOAGULATION: Primary | ICD-10-CM

## 2024-05-09 LAB
ALBUMIN SERPL-MCNC: 4.2 G/DL (ref 3.8–4.8)
ALBUMIN/GLOB SERPL: 1.8 {RATIO} (ref 1.2–2.2)
ALP SERPL-CCNC: 135 IU/L (ref 44–121)
ALT SERPL-CCNC: 8 IU/L (ref 0–44)
AST SERPL-CCNC: 32 IU/L (ref 0–40)
BASOPHILS # BLD AUTO: 0 X10E3/UL (ref 0–0.2)
BASOPHILS NFR BLD AUTO: 1 %
BILIRUB SERPL-MCNC: 0.5 MG/DL (ref 0–1.2)
BUN SERPL-MCNC: 19 MG/DL (ref 8–27)
BUN/CREAT SERPL: 15 (ref 10–24)
CALCIUM SERPL-MCNC: 9.2 MG/DL (ref 8.6–10.2)
CHLORIDE SERPL-SCNC: 102 MMOL/L (ref 96–106)
CO2 SERPL-SCNC: 23 MMOL/L (ref 20–29)
CREAT SERPL-MCNC: 1.25 MG/DL (ref 0.76–1.27)
DIGOXIN SERPL-MCNC: 0.9 NG/ML (ref 0.5–0.9)
EGFRCR SERPLBLD CKD-EPI 2021: 59 ML/MIN/1.73
EOSINOPHIL # BLD AUTO: 0 X10E3/UL (ref 0–0.4)
EOSINOPHIL NFR BLD AUTO: 1 %
ERYTHROCYTE [DISTWIDTH] IN BLOOD BY AUTOMATED COUNT: 14.5 % (ref 11.6–15.4)
GLOBULIN SER CALC-MCNC: 2.4 G/DL (ref 1.5–4.5)
GLUCOSE SERPL-MCNC: 84 MG/DL (ref 70–99)
HCT VFR BLD AUTO: 41.2 % (ref 37.5–51)
HGB BLD-MCNC: 13.1 G/DL (ref 13–17.7)
IMM GRANULOCYTES # BLD AUTO: 0 X10E3/UL (ref 0–0.1)
IMM GRANULOCYTES NFR BLD AUTO: 0 %
INR PPP: 2.1 (ref 0.9–1.2)
LDH SERPL L TO P-CCNC: 160 IU/L (ref 121–224)
LYMPHOCYTES # BLD AUTO: 0.7 X10E3/UL (ref 0.7–3.1)
LYMPHOCYTES NFR BLD AUTO: 23 %
MAGNESIUM SERPL-MCNC: 2.1 MG/DL (ref 1.6–2.3)
MCH RBC QN AUTO: 25.3 PG (ref 26.6–33)
MCHC RBC AUTO-ENTMCNC: 31.8 G/DL (ref 31.5–35.7)
MCV RBC AUTO: 80 FL (ref 79–97)
MONOCYTES # BLD AUTO: 0.4 X10E3/UL (ref 0.1–0.9)
MONOCYTES NFR BLD AUTO: 13 %
NEUTROPHILS # BLD AUTO: 2 X10E3/UL (ref 1.4–7)
NEUTROPHILS NFR BLD AUTO: 62 %
NT-PROBNP SERPL-MCNC: 721 PG/ML (ref 0–486)
PLATELET # BLD AUTO: 200 X10E3/UL (ref 150–450)
POTASSIUM SERPL-SCNC: 4.8 MMOL/L (ref 3.5–5.2)
PROT SERPL-MCNC: 6.6 G/DL (ref 6–8.5)
PROTHROMBIN TIME: 21.9 SEC (ref 9.1–12)
RBC # BLD AUTO: 5.17 X10E6/UL (ref 4.14–5.8)
SODIUM SERPL-SCNC: 140 MMOL/L (ref 134–144)
WBC # BLD AUTO: 3.2 X10E3/UL (ref 3.4–10.8)

## 2024-05-09 NOTE — PROGRESS NOTES
ADVANCED HEART FAILURE CENTER  [facility] in [place], [place]      INR result reviewed with JORDEN Molina NP  who made the following recommendations (VORB): no changes and recheck 1 week. Patient's wife notified and verbalized understanding. They had no further questions. (See anticoag tracker)     Ellen Monge RN

## 2024-05-13 ENCOUNTER — TELEPHONE (OUTPATIENT)
Age: 79
End: 2024-05-13

## 2024-05-13 NOTE — TELEPHONE ENCOUNTER
Recent labwork normal per RAYMON Hughes. Called and notified patient's wife of baseline labwork. She verbalized understanding.

## 2024-05-14 ENCOUNTER — HOSPITAL ENCOUNTER (OUTPATIENT)
Facility: HOSPITAL | Age: 79
Setting detail: INFUSION SERIES
Discharge: HOME | End: 2024-05-14
Payer: MEDICARE

## 2024-05-14 VITALS — RESPIRATION RATE: 18 BRPM | TEMPERATURE: 97.4 F | OXYGEN SATURATION: 98 % | HEART RATE: 78 BPM

## 2024-05-14 DIAGNOSIS — I50.20 HFREF (HEART FAILURE WITH REDUCED EJECTION FRACTION) (HCC): ICD-10-CM

## 2024-05-14 DIAGNOSIS — E61.1 IRON DEFICIENCY: Primary | ICD-10-CM

## 2024-05-14 PROCEDURE — 96374 THER/PROPH/DIAG INJ IV PUSH: CPT

## 2024-05-14 PROCEDURE — 2580000003 HC RX 258: Performed by: NURSE PRACTITIONER

## 2024-05-14 PROCEDURE — 6360000002 HC RX W HCPCS: Performed by: NURSE PRACTITIONER

## 2024-05-14 RX ORDER — ONDANSETRON 2 MG/ML
8 INJECTION INTRAMUSCULAR; INTRAVENOUS
OUTPATIENT
Start: 2024-05-14

## 2024-05-14 RX ORDER — SODIUM CHLORIDE 9 MG/ML
5-250 INJECTION, SOLUTION INTRAVENOUS PRN
OUTPATIENT
Start: 2024-05-14

## 2024-05-14 RX ORDER — EPINEPHRINE 1 MG/ML
0.3 INJECTION, SOLUTION INTRAMUSCULAR; SUBCUTANEOUS PRN
OUTPATIENT
Start: 2024-05-14

## 2024-05-14 RX ORDER — ALBUTEROL SULFATE 90 UG/1
4 AEROSOL, METERED RESPIRATORY (INHALATION) PRN
OUTPATIENT
Start: 2024-05-14

## 2024-05-14 RX ORDER — SODIUM CHLORIDE 9 MG/ML
5-250 INJECTION, SOLUTION INTRAVENOUS PRN
Status: DISCONTINUED | OUTPATIENT
Start: 2024-05-14 | End: 2024-05-15 | Stop reason: HOSPADM

## 2024-05-14 RX ORDER — HEPARIN 100 UNIT/ML
500 SYRINGE INTRAVENOUS PRN
OUTPATIENT
Start: 2024-05-14

## 2024-05-14 RX ORDER — ACETAMINOPHEN 325 MG/1
650 TABLET ORAL
OUTPATIENT
Start: 2024-05-14

## 2024-05-14 RX ORDER — SODIUM CHLORIDE 0.9 % (FLUSH) 0.9 %
5-40 SYRINGE (ML) INJECTION PRN
Status: DISCONTINUED | OUTPATIENT
Start: 2024-05-14 | End: 2024-05-15 | Stop reason: HOSPADM

## 2024-05-14 RX ORDER — SODIUM CHLORIDE 9 MG/ML
INJECTION, SOLUTION INTRAVENOUS CONTINUOUS
OUTPATIENT
Start: 2024-05-14

## 2024-05-14 RX ORDER — DIPHENHYDRAMINE HYDROCHLORIDE 50 MG/ML
50 INJECTION INTRAMUSCULAR; INTRAVENOUS
OUTPATIENT
Start: 2024-05-14

## 2024-05-14 RX ORDER — SODIUM CHLORIDE 0.9 % (FLUSH) 0.9 %
5-40 SYRINGE (ML) INJECTION PRN
OUTPATIENT
Start: 2024-05-14

## 2024-05-14 RX ADMIN — SODIUM CHLORIDE 25 ML/HR: 9 INJECTION, SOLUTION INTRAVENOUS at 13:20

## 2024-05-14 RX ADMIN — SODIUM CHLORIDE 200 MG: 9 INJECTION, SOLUTION INTRAVENOUS at 13:55

## 2024-05-14 ASSESSMENT — PAIN SCALES - GENERAL: PAINLEVEL_OUTOF10: 0

## 2024-05-14 NOTE — PROGRESS NOTES
[facility] Peds/Adult Note                       Date: May 14, 2024    Name: Bimal Chavira Sr    MRN: [id number]         : 1945    1300 Patient arrives for Venofer  without acute problems. Please see Epic for complete assessment and education provided.    Vital signs stable throughout and prior to discharge. Patient tolerated procedure well and was discharged without incident. Patient is aware of no further [facility] appointments and to follow up with referring provider for any questions or concerns.      Mr. Cait Gustafson's vitals were reviewed prior to and after treatment.   Patient Vitals for the past 12 hrs:   Temp Pulse Resp SpO2   24 1300 97.4 °F (36.3 °C) 78 18 98 %       Medications given:   Medications Administered         0.9 % sodium chloride infusion Admin Date  2024 Action  New Bag Dose  25 mL/hr Rate  25 mL/hr Route  IntraVENous Administered By  Mili Manzanares RN        iron sucrose (VENOFER) 200 mg in sodium chloride 0.9 % 100 mL IVPB Admin Date  2024 Action  New Bag Dose  200 mg Rate  480 mL/hr Route  IntraVENous Administered By  Mili Manzanares RN              Mr. Cait Gustafson tolerated the infusion, and had no complaints.    Mr. Cait Gustafson was [facility] in [facility] condition. Discharge Instructions provided to patient, patient verbalized understanding but denied the request for a copy of after visit summary.     Future Appointments   Date Time Provider Department Center   2024  1:00 PM Daylin Molina APRN - NP [facility]   2024 11:00 AM Shruthi Valles MD [facility] WENDI N LIVERMAN, RN  May 14, 2024  2:47 PM

## 2024-05-15 ENCOUNTER — ANTI-COAG VISIT (OUTPATIENT)
Age: 79
End: 2024-05-15
Payer: MEDICARE

## 2024-05-15 DIAGNOSIS — I50.20 HFREF (HEART FAILURE WITH REDUCED EJECTION FRACTION) (HCC): ICD-10-CM

## 2024-05-15 DIAGNOSIS — Z95.811 LVAD (LEFT VENTRICULAR ASSIST DEVICE) PRESENT (HCC): ICD-10-CM

## 2024-05-15 DIAGNOSIS — Z79.01 CHRONIC ANTICOAGULATION: Primary | ICD-10-CM

## 2024-05-15 LAB — INR BLD: 2.1

## 2024-05-15 PROCEDURE — 93793 ANTICOAG MGMT PT WARFARIN: CPT | Performed by: NURSE PRACTITIONER

## 2024-05-15 NOTE — PROGRESS NOTES
ADVANCED HEART FAILURE CENTER   in [place], [place]    INR result reviewed with RAYMON Hughes NP who made the following recommendations (VORB): Continue current coumadin maintenance plan and recheck INR 5/22/24. Patient notified and verbalized understanding. They had no further questions. (See anticoag tracker)     ZANDER COYLE RN

## 2024-05-21 ENCOUNTER — TELEPHONE (OUTPATIENT)
Age: 79
End: 2024-05-21

## 2024-05-21 NOTE — TELEPHONE ENCOUNTER
Telephone Call RE:  Lab Reminder      Outcome:     [] Patient verbalizes understanding    [] Unable to reach   [x] Left message              []     INR due 5/22/24.    ZANDER COYLE RN

## 2024-05-22 ENCOUNTER — ANTI-COAG VISIT (OUTPATIENT)
Age: 79
End: 2024-05-22
Payer: MEDICARE

## 2024-05-22 DIAGNOSIS — I50.20 HFREF (HEART FAILURE WITH REDUCED EJECTION FRACTION) (HCC): ICD-10-CM

## 2024-05-22 DIAGNOSIS — Z79.01 CHRONIC ANTICOAGULATION: Primary | ICD-10-CM

## 2024-05-22 DIAGNOSIS — Z95.811 LVAD (LEFT VENTRICULAR ASSIST DEVICE) PRESENT (HCC): ICD-10-CM

## 2024-05-22 LAB — INR BLD: 2.5

## 2024-05-22 PROCEDURE — 93793 ANTICOAG MGMT PT WARFARIN: CPT | Performed by: NURSE PRACTITIONER

## 2024-05-22 NOTE — PROGRESS NOTES
ADVANCED HEART FAILURE CENTER  [facility] in [place], [place]      INR result reviewed with JORDEN Molina NP  who made the following recommendations (VORB): Continue current coumadin maintenance plan and recheck INR 5/29/24. Patient's wife notified and verbalized understanding. They had no further questions. (See anticoag tracker).    ZANDER COYLE RN

## 2024-05-28 ENCOUNTER — TELEPHONE (OUTPATIENT)
Age: 79
End: 2024-05-28

## 2024-05-29 ENCOUNTER — ANTI-COAG VISIT (OUTPATIENT)
Age: 79
End: 2024-05-29
Payer: MEDICARE

## 2024-05-29 DIAGNOSIS — Z79.01 CHRONIC ANTICOAGULATION: Primary | ICD-10-CM

## 2024-05-29 LAB — INR BLD: 2.7

## 2024-05-29 PROCEDURE — 93793 ANTICOAG MGMT PT WARFARIN: CPT | Performed by: NURSE PRACTITIONER

## 2024-06-11 ENCOUNTER — TELEPHONE (OUTPATIENT)
Age: 79
End: 2024-06-11

## 2024-06-11 NOTE — TELEPHONE ENCOUNTER
Telephone Call RE:  Lab Reminder      Outcome:       Telephone Call RE:  Appointment reminder     Outcome:     [x] Patient confirmed appointment   [] Patient rescheduled appointment for    [] Unable to reach  [] Left message              [] Other:        Patient verbalizes understanding    [] Unable to reach   [] Left message              []       Cosme Pickens

## 2024-06-13 ENCOUNTER — ANTI-COAG VISIT (OUTPATIENT)
Age: 79
End: 2024-06-13

## 2024-06-13 DIAGNOSIS — R06.02 SHORTNESS OF BREATH: ICD-10-CM

## 2024-06-13 DIAGNOSIS — E83.42 HYPOMAGNESEMIA: ICD-10-CM

## 2024-06-13 DIAGNOSIS — I50.20 HFREF (HEART FAILURE WITH REDUCED EJECTION FRACTION) (HCC): Primary | ICD-10-CM

## 2024-06-13 DIAGNOSIS — I50.20 HFREF (HEART FAILURE WITH REDUCED EJECTION FRACTION) (HCC): ICD-10-CM

## 2024-06-13 DIAGNOSIS — Z95.811 LVAD (LEFT VENTRICULAR ASSIST DEVICE) PRESENT (HCC): ICD-10-CM

## 2024-06-13 DIAGNOSIS — Z79.01 CHRONIC ANTICOAGULATION: ICD-10-CM

## 2024-06-13 DIAGNOSIS — Z79.899 HIGH RISK MEDICATION USE: ICD-10-CM

## 2024-06-13 LAB
BASOPHILS # BLD AUTO: 0 X10E3/UL (ref 0–0.2)
BASOPHILS NFR BLD AUTO: 1 %
EOSINOPHIL # BLD AUTO: 0.1 X10E3/UL (ref 0–0.4)
EOSINOPHIL NFR BLD AUTO: 2 %
ERYTHROCYTE [DISTWIDTH] IN BLOOD BY AUTOMATED COUNT: 14.5 % (ref 11.6–15.4)
HCT VFR BLD AUTO: 44.7 % (ref 37.5–51)
HGB BLD-MCNC: 13.8 G/DL (ref 13–17.7)
IMM GRANULOCYTES # BLD AUTO: 0 X10E3/UL (ref 0–0.1)
IMM GRANULOCYTES NFR BLD AUTO: 0 %
LYMPHOCYTES # BLD AUTO: 0.6 X10E3/UL (ref 0.7–3.1)
LYMPHOCYTES NFR BLD AUTO: 20 %
MCH RBC QN AUTO: 25.5 PG (ref 26.6–33)
MCHC RBC AUTO-ENTMCNC: 30.9 G/DL (ref 31.5–35.7)
MCV RBC AUTO: 83 FL (ref 79–97)
MONOCYTES # BLD AUTO: 0.3 X10E3/UL (ref 0.1–0.9)
MONOCYTES NFR BLD AUTO: 11 %
NEUTROPHILS # BLD AUTO: 2 X10E3/UL (ref 1.4–7)
NEUTROPHILS NFR BLD AUTO: 66 %
PLATELET # BLD AUTO: 183 X10E3/UL (ref 150–450)
RBC # BLD AUTO: 5.41 X10E6/UL (ref 4.14–5.8)
WBC # BLD AUTO: 3 X10E3/UL (ref 3.4–10.8)

## 2024-06-13 NOTE — PROGRESS NOTES
Patient's wife called, stated they are on the way to the lab as unable to get POC inr at home. Lab orders faxed per patient request

## 2024-06-14 ENCOUNTER — ANTI-COAG VISIT (OUTPATIENT)
Age: 79
End: 2024-06-14

## 2024-06-14 ENCOUNTER — TELEPHONE (OUTPATIENT)
Age: 79
End: 2024-06-14

## 2024-06-14 DIAGNOSIS — Z79.899 HIGH RISK MEDICATION USE: Primary | ICD-10-CM

## 2024-06-14 DIAGNOSIS — Z79.01 CHRONIC ANTICOAGULATION: Primary | ICD-10-CM

## 2024-06-14 LAB
ALBUMIN SERPL-MCNC: 4.3 G/DL (ref 3.8–4.8)
ALBUMIN/GLOB SERPL: 1.7 {RATIO}
ALP SERPL-CCNC: 164 IU/L (ref 44–121)
ALT SERPL-CCNC: 7 IU/L (ref 0–44)
AST SERPL-CCNC: 31 IU/L (ref 0–40)
BILIRUB SERPL-MCNC: 0.6 MG/DL (ref 0–1.2)
BUN SERPL-MCNC: 19 MG/DL (ref 8–27)
BUN/CREAT SERPL: 16 (ref 10–24)
CALCIUM SERPL-MCNC: 9.3 MG/DL (ref 8.6–10.2)
CHLORIDE SERPL-SCNC: 101 MMOL/L (ref 96–106)
CO2 SERPL-SCNC: 25 MMOL/L (ref 20–29)
CREAT SERPL-MCNC: 1.17 MG/DL (ref 0.76–1.27)
DIGOXIN SERPL-MCNC: 1.5 NG/ML (ref 0.5–0.9)
EGFRCR SERPLBLD CKD-EPI 2021: 64 ML/MIN/1.73
GLOBULIN SER CALC-MCNC: 2.6 G/DL (ref 1.5–4.5)
GLUCOSE SERPL-MCNC: 136 MG/DL (ref 70–99)
INR PPP: 1.9 (ref 0.9–1.2)
LDH SERPL L TO P-CCNC: 180 IU/L (ref 121–224)
MAGNESIUM SERPL-MCNC: 2.2 MG/DL (ref 1.6–2.3)
NT-PROBNP SERPL-MCNC: 772 PG/ML (ref 0–486)
POTASSIUM SERPL-SCNC: 4.3 MMOL/L (ref 3.5–5.2)
PROT SERPL-MCNC: 6.9 G/DL (ref 6–8.5)
PROTHROMBIN TIME: 19.5 SEC (ref 9.1–12)
SODIUM SERPL-SCNC: 141 MMOL/L (ref 134–144)

## 2024-06-14 RX ORDER — DIGOXIN 125 MCG
TABLET ORAL
Qty: 30 TABLET | Refills: 3 | Status: SHIPPED | OUTPATIENT
Start: 2024-06-14

## 2024-06-14 NOTE — TELEPHONE ENCOUNTER
----- Message from SHAQ Tirado NP sent at 6/14/2024  3:22 PM EDT -----    His dig level is now high.  Please have him hold it for 2 days, and then I want to start alternating days with 1 tablet or 2 tablets.       \"Take one tablet on Tuesdays, Thursdays, and Saturdays.  Take two tablets on Mondays, Wednesdays, Fridays, and Sundays.  \"      Let's recheck a dig level in 2 weeks, if we can, please.         Reviewed labwork with RAYMON Hughes who stated VORB digoxin 125mcg Take one tablet on Tuesdays, Thursdays, and Saturdays.  Take two tablets on Mondays, Wednesdays, Fridays, and Sundays (provider changed order in epic) and recheck dig in 2 weeks. Reviewed with patient and his wife on speaker phone, they both verbalized understanding.

## 2024-06-14 NOTE — TELEPHONE ENCOUNTER
----- Message from SHAQ Tirado NP sent at 6/14/2024  3:22 PM EDT -----  His dig level is now high.  Please have him hold it for 2 days, and then I want to start alternating days with 1 tablet or 2 tablets.   \"Take one tablet on Tuesdays, Thursdays, and Saturdays.  Take two tablets on Mondays, Wednesdays, Fridays, and Sundays.  \"    Let's recheck a dig level in 2 weeks, if we can, please.

## 2024-06-14 NOTE — PROGRESS NOTES
ADVANCED HEART FAILURE CENTER  [facility] in [place], [place]      INR result reviewed with BERTA Kaplan NP who made the following recommendations (VORB): 10mg tonight and recheck 1 week. Patient notified and verbalized understanding. They had no further questions. (See anticoag tracker)     Ellen Monge RN

## 2024-06-17 NOTE — PATIENT INSTRUCTIONS
Medication changes:    Your INR was 2.4, take your regular coumadin dose and recheck your INR in 1 week      Testing Ordered:    An order for echo cardiogram was previously placed to be done in July. Please call coordination of care to schedule this at [phone]     Other Recommendations:     Continue to change drive line exit site dressing 3 times a week using sterile technique,     Ensure you are drinking an adequate amount of water with a goal of 6-8 eight ounce glasses (1.5-2 liters) of fluid daily. Your urine should be clear and light yellow straw colored.       Monthly LVAD Education Tip:  LVAD Alarms Education:     Types of Alarms:   advisory and hazard alarms are messages that appear on the user interface screen when some problem with the system needs immediate attention. Some of the alarms indicate problems you can address yourself (for example, a disconnected power cable that you should reconnect). But most instruct you to call your hospital contact for instructions.1    For all alarms you should first hit the silence button so you can think more clearly. Silencing the alarm will not make it go away. It will silence the alarm tone only. You should then check for the green illuminated arrows (pump running symbol). If the green arrows are no longer illuminated, contact 911 then call the LVAD emergency pager ([phone]). The appropriate response to each alarm type is listed below.    Advisory Alarms:        \"Yellow Wrench\" Advisory alarms will silence for 4 hours. These types of alarms are NOT an emergency. The pump is still providing support. If you experience a \"Yellow Wrench\" Alarm after hours, you may contact the VAD team the next day during normal daytime hours.       Hazard Alarms:        Hazard Alarms will silence for 2 minutes.       Recalling the Alarms on the :    The  will store the last six alarms that have occurred with the LVAD. There may

## 2024-06-18 ENCOUNTER — OFFICE VISIT (OUTPATIENT)
Age: 79
End: 2024-06-18
Payer: MEDICARE

## 2024-06-18 ENCOUNTER — ANTI-COAG VISIT (OUTPATIENT)
Age: 79
End: 2024-06-18

## 2024-06-18 ENCOUNTER — TELEPHONE (OUTPATIENT)
Age: 79
End: 2024-06-18

## 2024-06-18 VITALS
RESPIRATION RATE: 16 BRPM | OXYGEN SATURATION: 98 % | HEIGHT: 72 IN | TEMPERATURE: 97.8 F | HEART RATE: 71 BPM | WEIGHT: 200 LBS | SYSTOLIC BLOOD PRESSURE: 90 MMHG | BODY MASS INDEX: 27.09 KG/M2

## 2024-06-18 DIAGNOSIS — I50.20 HFREF (HEART FAILURE WITH REDUCED EJECTION FRACTION) (HCC): Primary | ICD-10-CM

## 2024-06-18 DIAGNOSIS — I50.22 CHRONIC SYSTOLIC HEART FAILURE (HCC): ICD-10-CM

## 2024-06-18 DIAGNOSIS — Z79.01 CHRONIC ANTICOAGULATION: ICD-10-CM

## 2024-06-18 LAB
INR BLD: 2.4
INR BLD: 2.4
POC INR: 2.4
PROTHROMBIN TIME, POC: NORMAL

## 2024-06-18 PROCEDURE — 85610 PROTHROMBIN TIME: CPT | Performed by: NURSE PRACTITIONER

## 2024-06-18 PROCEDURE — G8419 CALC BMI OUT NRM PARAM NOF/U: HCPCS | Performed by: NURSE PRACTITIONER

## 2024-06-18 PROCEDURE — G8427 DOCREV CUR MEDS BY ELIG CLIN: HCPCS | Performed by: NURSE PRACTITIONER

## 2024-06-18 PROCEDURE — 99215 OFFICE O/P EST HI 40 MIN: CPT | Performed by: NURSE PRACTITIONER

## 2024-06-18 PROCEDURE — 1036F TOBACCO NON-USER: CPT | Performed by: NURSE PRACTITIONER

## 2024-06-18 PROCEDURE — 1123F ACP DISCUSS/DSCN MKR DOCD: CPT | Performed by: NURSE PRACTITIONER

## 2024-06-18 PROCEDURE — 93750 INTERROGATION VAD IN PERSON: CPT | Performed by: NURSE PRACTITIONER

## 2024-06-18 ASSESSMENT — PATIENT HEALTH QUESTIONNAIRE - PHQ9
SUM OF ALL RESPONSES TO PHQ QUESTIONS 1-9: 0
2. FEELING DOWN, DEPRESSED OR HOPELESS: NOT AT ALL
SUM OF ALL RESPONSES TO PHQ QUESTIONS 1-9: 0
SUM OF ALL RESPONSES TO PHQ9 QUESTIONS 1 & 2: 0
1. LITTLE INTEREST OR PLEASURE IN DOING THINGS: NOT AT ALL

## 2024-06-18 NOTE — PROGRESS NOTES
ADVANCED HEART FAILURE CENTER  [facility] in [place], [place]      INR result reviewed with JORDEN Molina NP  who made the following recommendations (VORB): no changes and recheck 1 week. Patient notified at clinic visit and verbalized understanding. They had no further questions. (See anticoag tracker)     Ellen Monge RN

## 2024-06-18 NOTE — PROGRESS NOTES
ADVANCED HEART FAILURE CENTER  [facility] in [place], [place]  LVAD Coordinator Clinic Visit Note  Chief Complaint   Patient presents with    Congestive Heart Failure       BP (!) 90/0 (Site: Left Upper Arm, Position: Supine, Cuff Size: Medium Adult)   Pulse 71   Temp 97.8 °F (36.6 °C) (Oral)   Resp 16   Ht 1.829 m (6' 0.01\")   Wt 90.7 kg (200 lb)   SpO2 98%   BMI 27.12 kg/m²     LVAD  LVAD Type:: Left Ventricular Assist Device (LVAD)  Pump Speed (rpm): 4800  Pump Flow (lpm): 4.2  MAP (mmHg): 90  Set Low Speed (rpm): 4800  Pump Pulse Index (PI): 3.8  Pump Power (Ji): 3.1  Battery Life Checked: Yes  Backup Controller Present: Yes  Driveline Dressing: Clean, Dry and Intact  Outpatient: Yes  MAP in Therapeutic Range (Outpatient): Yes         Bimal Chavira Sr is a 79 y.o. male with a history of ICM with Heartmate 3 implant date of 04/05/2022. LVAD interrogation completed in clinic, results reported to provider, Notable for PI events.     Patient denied s/s of driveline infection or driveline trauma (including  drops). Denies LVAD alarms at home. See flow sheet for details. Driveline inspected for integrity.  Above reported to provider.     JORDEN Molina recommended VORB no changes to coumadin dosing based on INR of 2.4- see anticoag encounter      All orders entered per VORB. All provider instructions placed in AVS and reviewed with patient. LVAD education provided on alarm response and battery calibration. Educated patient on expected follow up.  Time given to ask questions. Patient verbalized understanding and had no further questions.     Ellen Monge, RN  VAD Coordinator      
(CARDIA)     Difficulty of Paying Living Expenses: Not very hard   Transportation Needs: Unknown (11/7/2023)    PRAPARE - Transportation     Lack of Transportation (Non-Medical): No   Physical Activity: Insufficiently Active (11/7/2023)    Exercise Vital Sign     Days of Exercise per Week: 2 days     Minutes of Exercise per Session: 20 min   Housing Stability: Unknown (11/7/2023)    Housing Stability Vital Sign     Unstable Housing in the Last Year: No       LABORATORY RESULTS:  Lab Results   Component Value Date/Time     06/13/2024 09:30 AM    K 4.3 06/13/2024 09:30 AM     06/13/2024 09:30 AM    CO2 25 06/13/2024 09:30 AM    BUN 19 06/13/2024 09:30 AM    GFRAA >60 06/27/2022 03:11 PM    GLOB 4.0 06/27/2022 03:11 PM    ALT 7 06/13/2024 09:30 AM    AST 31 06/13/2024 09:30 AM       Lab Results   Component Value Date/Time    WBC 3.0 06/13/2024 09:30 AM    HGB 13.8 06/13/2024 09:30 AM    HCT 44.7 06/13/2024 09:30 AM     06/13/2024 09:30 AM    MCV 83 06/13/2024 09:30 AM    INR 2.4 06/18/2024 01:45 PM    INR 2.40 06/18/2024 12:00 AM    INR 2.40 06/18/2024 12:00 AM    INR 2.4 06/16/2022 10:12 AM       ALLERGY:  Allergies   Allergen Reactions    Oxycodone Anaphylaxis    Ace Inhibitors     Beta Adrenergic Blockers     Penicillins Hives    Spironolactone      Other reaction(s): Gynecomastia       CURRENT MEDICATIONS:    Current Outpatient Medications:     digoxin (LANOXIN) 125 MCG tablet, Take one tablet on Tuesdays, Thursdays, and Saturdays. Take two tablets on Mondays, Wednesdays, Fridays, and Sundays., Disp: 30 tablet, Rfl: 3    atorvastatin (LIPITOR) 40 MG tablet, Take 0.5 tablets by mouth daily, Disp: 30 tablet, Rfl: 0    metFORMIN (GLUCOPHAGE) 500 MG tablet, Take 1 tablet by mouth 2 times daily (with meals), Disp: 180 tablet, Rfl: 2    famotidine (PEPCID) 20 MG tablet, Take 1 tablet by mouth in the morning and 1 tablet in the evening., Disp: 60 tablet, Rfl: 2    hydrALAZINE (APRESOLINE) 25 MG tablet,

## 2024-06-18 NOTE — TELEPHONE ENCOUNTER
Received call from patient's wife stating patient's LVAD is alarming \"low battery.\" LVAD could be heard in the back round with advisory alarm tone. Instructed patient to change to a fresh pair of batteries. Patient changed batteries and alarm resolved. Instructed patient to also change to his back up set of clips and clean current set of clips with alcohol swabs. Patient verbalized understanding, stated he will call for any further alarms.  JORDEN Molina notified.

## 2024-06-24 ENCOUNTER — TELEPHONE (OUTPATIENT)
Age: 79
End: 2024-06-24

## 2024-06-25 ENCOUNTER — ANTI-COAG VISIT (OUTPATIENT)
Age: 79
End: 2024-06-25
Payer: MEDICARE

## 2024-06-25 DIAGNOSIS — Z79.01 CHRONIC ANTICOAGULATION: Primary | ICD-10-CM

## 2024-06-25 LAB — INR BLD: 2.3

## 2024-06-25 PROCEDURE — 93793 ANTICOAG MGMT PT WARFARIN: CPT | Performed by: NURSE PRACTITIONER

## 2024-06-25 NOTE — PROGRESS NOTES
ADVANCED HEART FAILURE CENTER  [facility] in [place], [place]      INR result reviewed with BERTA Kaplan NP who made the following recommendations (VORB): no changes and recheck 1 week. Patient's wife notified of provider instructions via voicemail, requested call back to confirm   (See anticoag tracker)         06/26: Called and spoke with patient's wife who confirmed provider instructions     Ellen Monge RN

## 2024-06-28 DIAGNOSIS — Z79.899 HIGH RISK MEDICATION USE: ICD-10-CM

## 2024-06-28 LAB — DIGOXIN SERPL-MCNC: 0.9 NG/ML (ref 0.5–0.9)

## 2024-07-01 ENCOUNTER — TELEPHONE (OUTPATIENT)
Age: 79
End: 2024-07-01

## 2024-07-02 ENCOUNTER — ANTI-COAG VISIT (OUTPATIENT)
Age: 79
End: 2024-07-02
Payer: MEDICARE

## 2024-07-02 DIAGNOSIS — Z79.01 CHRONIC ANTICOAGULATION: Primary | ICD-10-CM

## 2024-07-02 LAB — INR BLD: 1.4

## 2024-07-02 PROCEDURE — 93793 ANTICOAG MGMT PT WARFARIN: CPT | Performed by: NURSE PRACTITIONER

## 2024-07-02 NOTE — PROGRESS NOTES
ADVANCED HEART FAILURE CENTER  [facility] in [place], [place]      INR result reviewed with JORDEN Molina NP  who made the following recommendations (VORB): 7.5mg tonight and recheck tomorrow. Patient's wife notified and verbalized understanding. They had no further questions. (See anticoag tracker)     Ellen Monge RN

## 2024-07-03 ENCOUNTER — ANTI-COAG VISIT (OUTPATIENT)
Age: 79
End: 2024-07-03
Payer: MEDICARE

## 2024-07-03 DIAGNOSIS — Z79.01 CHRONIC ANTICOAGULATION: Primary | ICD-10-CM

## 2024-07-03 DIAGNOSIS — I50.20 HFREF (HEART FAILURE WITH REDUCED EJECTION FRACTION) (HCC): ICD-10-CM

## 2024-07-03 DIAGNOSIS — Z95.811 LVAD (LEFT VENTRICULAR ASSIST DEVICE) PRESENT (HCC): ICD-10-CM

## 2024-07-03 LAB — INR BLD: 1.7

## 2024-07-03 PROCEDURE — 93793 ANTICOAG MGMT PT WARFARIN: CPT | Performed by: NURSE PRACTITIONER

## 2024-07-03 NOTE — PROGRESS NOTES
ADVANCED HEART FAILURE CENTER  [facility] in [place], [place]      INR result reviewed with JORDEN Molina NP  who made the following recommendations (VORB): Take 7.5mg coumadin tonight and tomorrow and recheck INR 7/5/24. Patient's wife notified. She verbalized understanding and had no further questions. (See anticoag tracker)     ZANDER COYLE RN

## 2024-07-05 ENCOUNTER — ANTI-COAG VISIT (OUTPATIENT)
Age: 79
End: 2024-07-05
Payer: MEDICARE

## 2024-07-05 DIAGNOSIS — Z79.01 CHRONIC ANTICOAGULATION: Primary | ICD-10-CM

## 2024-07-05 LAB — INR BLD: 2.1

## 2024-07-05 PROCEDURE — 93793 ANTICOAG MGMT PT WARFARIN: CPT | Performed by: NURSE PRACTITIONER

## 2024-07-05 NOTE — PROGRESS NOTES
2nd attempt:    Called patient and spoke with her. Advised patient of results and POC below. Patient states understanding. ADVANCED HEART FAILURE CENTER  [facility] in [place], [place]      INR result reviewed with JORDEN Molina NP  who made the following recommendations (VORB): 7.5mg sunday and recheck 07/09. Patient's wife notified and verbalized understanding. They had no further questions. (See anticoag tracker)     Ellen Monge RN

## 2024-07-08 ENCOUNTER — OFFICE VISIT (OUTPATIENT)
Age: 79
End: 2024-07-08
Payer: MEDICARE

## 2024-07-08 ENCOUNTER — TELEPHONE (OUTPATIENT)
Age: 79
End: 2024-07-08

## 2024-07-08 VITALS
WEIGHT: 202.6 LBS | BODY MASS INDEX: 27.44 KG/M2 | RESPIRATION RATE: 16 BRPM | TEMPERATURE: 99 F | HEART RATE: 78 BPM | OXYGEN SATURATION: 95 % | SYSTOLIC BLOOD PRESSURE: 99 MMHG | DIASTOLIC BLOOD PRESSURE: 66 MMHG | HEIGHT: 72 IN

## 2024-07-08 DIAGNOSIS — U07.1 COVID-19: Primary | ICD-10-CM

## 2024-07-08 LAB
LOT EXPIRE DATE: ABNORMAL
LOT KIT NUMBER: ABNORMAL
SARS-COV-2, POC: DETECTED
VALID INTERNAL CONTROL: YES
VENDOR AND KIT NAME POC: ABNORMAL

## 2024-07-08 PROCEDURE — 1123F ACP DISCUSS/DSCN MKR DOCD: CPT | Performed by: STUDENT IN AN ORGANIZED HEALTH CARE EDUCATION/TRAINING PROGRAM

## 2024-07-08 PROCEDURE — 3078F DIAST BP <80 MM HG: CPT | Performed by: STUDENT IN AN ORGANIZED HEALTH CARE EDUCATION/TRAINING PROGRAM

## 2024-07-08 PROCEDURE — 1036F TOBACCO NON-USER: CPT | Performed by: STUDENT IN AN ORGANIZED HEALTH CARE EDUCATION/TRAINING PROGRAM

## 2024-07-08 PROCEDURE — 87426 SARSCOV CORONAVIRUS AG IA: CPT | Performed by: STUDENT IN AN ORGANIZED HEALTH CARE EDUCATION/TRAINING PROGRAM

## 2024-07-08 PROCEDURE — G8419 CALC BMI OUT NRM PARAM NOF/U: HCPCS | Performed by: STUDENT IN AN ORGANIZED HEALTH CARE EDUCATION/TRAINING PROGRAM

## 2024-07-08 PROCEDURE — G8427 DOCREV CUR MEDS BY ELIG CLIN: HCPCS | Performed by: STUDENT IN AN ORGANIZED HEALTH CARE EDUCATION/TRAINING PROGRAM

## 2024-07-08 PROCEDURE — 99213 OFFICE O/P EST LOW 20 MIN: CPT | Performed by: STUDENT IN AN ORGANIZED HEALTH CARE EDUCATION/TRAINING PROGRAM

## 2024-07-08 PROCEDURE — PBSHW AMB POC SARS-COV-2: Performed by: STUDENT IN AN ORGANIZED HEALTH CARE EDUCATION/TRAINING PROGRAM

## 2024-07-08 PROCEDURE — 3074F SYST BP LT 130 MM HG: CPT | Performed by: STUDENT IN AN ORGANIZED HEALTH CARE EDUCATION/TRAINING PROGRAM

## 2024-07-08 ASSESSMENT — PATIENT HEALTH QUESTIONNAIRE - PHQ9
1. LITTLE INTEREST OR PLEASURE IN DOING THINGS: NOT AT ALL
SUM OF ALL RESPONSES TO PHQ QUESTIONS 1-9: 0
SUM OF ALL RESPONSES TO PHQ9 QUESTIONS 1 & 2: 0
SUM OF ALL RESPONSES TO PHQ QUESTIONS 1-9: 0
SUM OF ALL RESPONSES TO PHQ QUESTIONS 1-9: 0
2. FEELING DOWN, DEPRESSED OR HOPELESS: NOT AT ALL
SUM OF ALL RESPONSES TO PHQ QUESTIONS 1-9: 0

## 2024-07-08 NOTE — TELEPHONE ENCOUNTER
0735: voicemail received from patient's wife stating patient \"caught her cold\" and she will request an appointment with his PCP today for evaluation.     1103: returned call to patient's wife and spoke with wife and patient on speaker phone. Patient's wife stated that patient is having cough and congestion and they are on the way to patients PCP now for an appointment. Stated that they both took COVID tests which were negative. Requested patient return call to [facility] with any new symptoms, or LVAD alarms, both verbalized understanding.      Telephone Call RE:  Lab Reminder      Outcome:     [x] Patient verbalizes understanding    [] Unable to reach   [] Left message              []       Ellen Monge RN

## 2024-07-08 NOTE — PROGRESS NOTES
Bimal Chavira  is a 79 y.o. male who was seen in clinic today (7/8/2024) for an acute visit.     Assessment & Plan:   Below is the assessment and plan developed based on review of pertinent history, physical exam, labs, studies, and medications.    1. COVID-19  -     AMB POC SARS-COV-2  He is high risk but too many drug interactions to use Paxlovid. I think he could use Molnupiravir but his symptoms are mild and I am not sure of the efficacy so we decided to hold off.   Wife will monitor his closely.  Recommend sx based treatment with OTC agents.   Reviewed current CDC recommendations for quarantine  If minor SOB develops can try ICS-LABA inhaler. If anymore more needs to go to ER.     Subjective:   Bimal was seen today for Sweats, Chills, Cough, and Nasal Congestion    Sx started 7/5 or 7/6. Wife we feeling sick the day prior.  Chills, cough with clear sputum.  Doesn't feel fatigued.   No SOB, DURAN.  Has been taking mucinex DM.    Patient Active Problem List   Diagnosis    Benign neoplasm of colon    Insomnia, unspecified    BPH without obstruction/lower urinary tract symptoms    Type 2 diabetes, controlled, with peripheral circulatory disorder (HCC)    Mixed hyperlipidemia    Reflux esophagitis    Proteinuria    Calculus of kidney    Coronary atherosclerosis of native coronary artery    HFrEF (heart failure with reduced ejection fraction) (HCC)    LVAD (left ventricular assist device) present (HCC)    History of lung cancer    Sleep apnea    MGUS (monoclonal gammopathy of unknown significance)    Ischemic cardiomyopathy    Chronic anticoagulation    Elevated PSA    Iron deficiency    Chronic systolic heart failure (HCC)    Essential hypertension       Prior to Admission medications    Medication Sig Start Date End Date Taking? Authorizing Provider   digoxin (LANOXIN) 125 MCG tablet Take one tablet on Tuesdays, Thursdays, and Saturdays.  Take two tablets on Mondays, Wednesdays, Fridays, and Sundays. 6/14/24  Yes

## 2024-07-09 ENCOUNTER — ANTI-COAG VISIT (OUTPATIENT)
Age: 79
End: 2024-07-09
Payer: MEDICARE

## 2024-07-09 DIAGNOSIS — Z79.01 CHRONIC ANTICOAGULATION: Primary | ICD-10-CM

## 2024-07-09 LAB — INR BLD: 2.3

## 2024-07-09 PROCEDURE — 93793 ANTICOAG MGMT PT WARFARIN: CPT | Performed by: NURSE PRACTITIONER

## 2024-07-09 NOTE — PROGRESS NOTES
ADVANCED HEART FAILURE CENTER  [facility] in [place], [place]      INR result reviewed with YNES Molina NP  who made the following recommendations (VORB): 7.5mg tomorrow and sunday and recheck 1 week. Notified provider of patient's positive covid test from PCP. Per ynes Molina VORB patient should take 7.5mg tomorrow per prior instructions but check INR on Friday instead.  Patient's wife notified and verbalized understanding. They had no further questions. (See anticoag tracker)     Ellen Monge RN

## 2024-07-10 ENCOUNTER — TELEPHONE (OUTPATIENT)
Age: 79
End: 2024-07-10

## 2024-07-10 NOTE — TELEPHONE ENCOUNTER
Call placed to patient's wife. Notified of normal dig level per provider, she verbalized understanding.

## 2024-07-10 NOTE — TELEPHONE ENCOUNTER
----- Message from SHAQ Tirado NP sent at 7/9/2024  4:27 PM EDT -----  He hasn't read my [employer] message from 7/1      \"Mr. Chavira, your digoxin level now looks great.  I want to continue with the new dosing regimen.  Please reach out with questions or concerns. \"

## 2024-07-11 ENCOUNTER — TELEPHONE (OUTPATIENT)
Age: 79
End: 2024-07-11

## 2024-07-12 ENCOUNTER — ANTI-COAG VISIT (OUTPATIENT)
Age: 79
End: 2024-07-12

## 2024-07-12 DIAGNOSIS — I50.20 HFREF (HEART FAILURE WITH REDUCED EJECTION FRACTION) (HCC): ICD-10-CM

## 2024-07-12 DIAGNOSIS — Z79.01 CHRONIC ANTICOAGULATION: Primary | ICD-10-CM

## 2024-07-12 DIAGNOSIS — E61.1 IRON DEFICIENCY: ICD-10-CM

## 2024-07-12 DIAGNOSIS — R06.02 SHORTNESS OF BREATH: ICD-10-CM

## 2024-07-12 DIAGNOSIS — Z79.01 CHRONIC ANTICOAGULATION: ICD-10-CM

## 2024-07-12 DIAGNOSIS — E83.42 HYPOMAGNESEMIA: ICD-10-CM

## 2024-07-12 DIAGNOSIS — Z95.811 LVAD (LEFT VENTRICULAR ASSIST DEVICE) PRESENT (HCC): ICD-10-CM

## 2024-07-12 DIAGNOSIS — Z79.899 HIGH RISK MEDICATION USE: ICD-10-CM

## 2024-07-12 LAB — INR BLD: 3.2

## 2024-07-12 NOTE — PROGRESS NOTES
ADVANCED HEART FAILURE CENTER  [facility] in [place], [place]      INR result reviewed with JORDEN Molina NP  who made the following recommendations (VORB): 5mg tonight and recheck 07/15. Patient's wife notified and verbalized understanding. They had no further questions. (See anticoag tracker)     Ellen Monge RN         no abdominal pain, no bloating, no constipation, no diarrhea, no nausea and no vomiting.

## 2024-07-15 ENCOUNTER — ANTI-COAG VISIT (OUTPATIENT)
Age: 79
End: 2024-07-15
Payer: MEDICARE

## 2024-07-15 DIAGNOSIS — Z79.01 CHRONIC ANTICOAGULATION: Primary | ICD-10-CM

## 2024-07-15 LAB — INR BLD: 3.6

## 2024-07-15 PROCEDURE — 93793 ANTICOAG MGMT PT WARFARIN: CPT | Performed by: NURSE PRACTITIONER

## 2024-07-15 NOTE — PROGRESS NOTES
ADVANCED HEART FAILURE CENTER  [facility] in [place], [place]      INR result reviewed with BERTA Kaplan NP who made the following recommendations (VORB): hold tonight and tomorrow night and recheck 1 week.   Patient's wife notified of provider instructions via voicemail, requested call back to confirm (See anticoag tracker)     Follow up call placed to patient's wife, she confirmed provider instructions.   Ellen Monge RN

## 2024-07-16 LAB
ALBUMIN SERPL-MCNC: 3.8 G/DL (ref 3.8–4.8)
ALP SERPL-CCNC: 143 IU/L (ref 44–121)
ALT SERPL-CCNC: 8 IU/L (ref 0–44)
AST SERPL-CCNC: 32 IU/L (ref 0–40)
BASOPHILS # BLD AUTO: 0 X10E3/UL (ref 0–0.2)
BASOPHILS NFR BLD AUTO: 0 %
BILIRUB SERPL-MCNC: 0.4 MG/DL (ref 0–1.2)
BUN SERPL-MCNC: 16 MG/DL (ref 8–27)
BUN/CREAT SERPL: 16 (ref 10–24)
CALCIUM SERPL-MCNC: 8.9 MG/DL (ref 8.6–10.2)
CHLORIDE SERPL-SCNC: 103 MMOL/L (ref 96–106)
CO2 SERPL-SCNC: 24 MMOL/L (ref 20–29)
CREAT SERPL-MCNC: 1 MG/DL (ref 0.76–1.27)
DIGOXIN SERPL-MCNC: 1.5 NG/ML (ref 0.5–0.9)
EGFRCR SERPLBLD CKD-EPI 2021: 77 ML/MIN/1.73
EOSINOPHIL # BLD AUTO: 0.1 X10E3/UL (ref 0–0.4)
EOSINOPHIL NFR BLD AUTO: 4 %
ERYTHROCYTE [DISTWIDTH] IN BLOOD BY AUTOMATED COUNT: 14.4 % (ref 11.6–15.4)
FERRITIN SERPL-MCNC: 291 NG/ML (ref 30–400)
GLOBULIN SER CALC-MCNC: 2.7 G/DL (ref 1.5–4.5)
GLUCOSE SERPL-MCNC: 110 MG/DL (ref 70–99)
HCT VFR BLD AUTO: 40.7 % (ref 37.5–51)
HGB BLD-MCNC: 12.6 G/DL (ref 13–17.7)
IMM GRANULOCYTES # BLD AUTO: 0 X10E3/UL (ref 0–0.1)
IMM GRANULOCYTES NFR BLD AUTO: 1 %
INR PPP: 3.2 (ref 0.9–1.2)
IRON SATN MFR SERPL: 28 % (ref 15–55)
IRON SERPL-MCNC: 59 UG/DL (ref 38–169)
LDH SERPL L TO P-CCNC: 164 IU/L (ref 121–224)
LYMPHOCYTES # BLD AUTO: 0.6 X10E3/UL (ref 0.7–3.1)
LYMPHOCYTES NFR BLD AUTO: 18 %
MAGNESIUM SERPL-MCNC: 2.1 MG/DL (ref 1.6–2.3)
MCH RBC QN AUTO: 25.4 PG (ref 26.6–33)
MCHC RBC AUTO-ENTMCNC: 31 G/DL (ref 31.5–35.7)
MCV RBC AUTO: 82 FL (ref 79–97)
MONOCYTES # BLD AUTO: 0.4 X10E3/UL (ref 0.1–0.9)
MONOCYTES NFR BLD AUTO: 11 %
NEUTROPHILS # BLD AUTO: 2.2 X10E3/UL (ref 1.4–7)
NEUTROPHILS NFR BLD AUTO: 66 %
NT-PROBNP SERPL-MCNC: 1090 PG/ML (ref 0–486)
PLATELET # BLD AUTO: 208 X10E3/UL (ref 150–450)
POTASSIUM SERPL-SCNC: 4.7 MMOL/L (ref 3.5–5.2)
PROT SERPL-MCNC: 6.5 G/DL (ref 6–8.5)
PROTHROMBIN TIME: 31.6 SEC (ref 9.1–12)
RBC # BLD AUTO: 4.97 X10E6/UL (ref 4.14–5.8)
SODIUM SERPL-SCNC: 140 MMOL/L (ref 134–144)
TIBC SERPL-MCNC: 209 UG/DL (ref 250–450)
UIBC SERPL-MCNC: 150 UG/DL (ref 111–343)
WBC # BLD AUTO: 3.4 X10E3/UL (ref 3.4–10.8)

## 2024-07-17 ENCOUNTER — TELEPHONE (OUTPATIENT)
Age: 79
End: 2024-07-17

## 2024-07-17 DIAGNOSIS — Z79.899 HIGH RISK MEDICATION USE: Primary | ICD-10-CM

## 2024-07-17 DIAGNOSIS — I50.22 CHRONIC SYSTOLIC HEART FAILURE (HCC): ICD-10-CM

## 2024-07-17 RX ORDER — DIGOXIN 125 MCG
125 TABLET ORAL DAILY
Qty: 30 TABLET | Refills: 2 | Status: SHIPPED | OUTPATIENT
Start: 2024-07-17

## 2024-07-17 NOTE — TELEPHONE ENCOUNTER
----- Message from SHAQ Edwards NP sent at 7/17/2024  3:24 PM EDT -----  Dig level is high at 1.5, can you call him and see when he took his digoxin in relationship to when he got his labs done?    1545: Spoke with patient's wife who confirmed patient takes digoxin at bedtime, and had not taken prior to lab draw. Informed her will notify provider and contact her with any further recommendations. She verbalized understanding and had no further questions.     Daylin Molina, SHAQ - NP (7/17/24 4:17 PM)     Please reduce dose to 125mcg daily (instead of alternating 125 and 250 every other day).  Recheck digoxin level in 1 week.     1620: Orders placed. Patient's wife notified. She verbalized understanding and had no further questions.     ZANDER COYLE RN  VAD Coordinator

## 2024-07-19 ENCOUNTER — TELEPHONE (OUTPATIENT)
Age: 79
End: 2024-07-19

## 2024-07-19 RX ORDER — DIGOXIN 125 MCG
125 TABLET ORAL DAILY
Qty: 7 TABLET | Refills: 0 | Status: SHIPPED | OUTPATIENT
Start: 2024-07-19 | End: 2024-07-26

## 2024-07-19 NOTE — TELEPHONE ENCOUNTER
Received call from patient's wife requesting 7 days of digoxin refill. States that she ordered this to be shipped to her but has not received it yet.  7 day script sent per BERTA HASSAN     Requested Prescriptions     Signed Prescriptions Disp Refills    digoxin (LANOXIN) 125 MCG tablet 7 tablet 0     Sig: Take 1 tablet by mouth daily for 7 days     Authorizing Provider: LEONID GIL     Ordering User: IFEANYI DAMIAN

## 2024-07-19 NOTE — TELEPHONE ENCOUNTER
1045: voicemail received from patient's wife stating patient was at hemetology practice and wishing to verify that Dr. Denis is the correct provider [facility] (for hematology to send our notes to LVAD provider).      1118: returned call to patient's wife. Confirmed that hematology practice can send notes to [facility] fax for provider review.  Confirmed Dr. Denis is medical director for [facility].     1444:     Telephone Call RE:  Lab Reminder      Outcome:     [x] Patient's wife verbalizes understanding    [] Unable to reach   [] Left message              []       Ellen Monge RN

## 2024-07-20 ENCOUNTER — TRANSCRIBE ORDERS (OUTPATIENT)
Facility: HOSPITAL | Age: 79
End: 2024-07-20

## 2024-07-20 DIAGNOSIS — C34.12 SQUAMOUS CELL CARCINOMA OF BRONCHUS IN LEFT UPPER LOBE (HCC): Primary | ICD-10-CM

## 2024-07-23 ENCOUNTER — TELEPHONE (OUTPATIENT)
Age: 79
End: 2024-07-23

## 2024-07-23 NOTE — TELEPHONE ENCOUNTER
Telephone Call RE:  Lab Reminder      Outcome:     [] Patient verbalizes understanding    [x] Unable to reach   [x] Left message - requested overdue inr               []       Ellen Monge RN

## 2024-07-24 DIAGNOSIS — Z79.899 HIGH RISK MEDICATION USE: ICD-10-CM

## 2024-07-24 DIAGNOSIS — I50.22 CHRONIC SYSTOLIC HEART FAILURE (HCC): ICD-10-CM

## 2024-07-26 ENCOUNTER — TELEPHONE (OUTPATIENT)
Age: 79
End: 2024-07-26

## 2024-07-26 ENCOUNTER — ANTI-COAG VISIT (OUTPATIENT)
Age: 79
End: 2024-07-26

## 2024-07-26 DIAGNOSIS — Z79.01 CHRONIC ANTICOAGULATION: Primary | ICD-10-CM

## 2024-07-26 LAB — INR BLD: 3.4

## 2024-07-26 NOTE — PROGRESS NOTES
ADVANCED HEART FAILURE CENTER  [facility] in [place], [place]      INR result reviewed with RAYMON Hughes NP who made the following recommendations (VORB): 2.5mg today, Saturday, and Monday and recheck 07/30. Patient's wife notified and verbalized understanding. They had no further questions. (See anticoag tracker)     Also reminded to schedule Echo- patient's wife given phone number to coordination of care, she stated she will call     Ellen Monge RN

## 2024-07-26 NOTE — TELEPHONE ENCOUNTER
Telephone Call RE:  Lab Reminder      Outcome:    RE: overdue INR and dig      [x] Patient's wife verbalizes understanding - states she will as patient to complete INR and go to the lab today    [] Unable to reach   [] Left message              []       Ellen Monge RN

## 2024-07-27 LAB — DIGOXIN SERPL-MCNC: 0.4 NG/ML (ref 0.5–0.9)

## 2024-07-29 ENCOUNTER — TELEPHONE (OUTPATIENT)
Age: 79
End: 2024-07-29

## 2024-07-29 RX ORDER — DIGOXIN 125 MCG
TABLET ORAL
Qty: 30 TABLET | Refills: 2
Start: 2024-07-29

## 2024-07-29 NOTE — TELEPHONE ENCOUNTER
----- Message from SHAQ Edwards NP sent at 7/28/2024 11:33 AM EDT -----  Regarding: digoxin  Dig level low, has he been taking his Digoxin daily?  ----- Message -----  From: Soy Garcia Incoming Amb Ref Lab Orders To [facility]  Sent: 7/27/2024   5:37 AM EDT  To: SHAQ Edwards NP

## 2024-07-29 NOTE — TELEPHONE ENCOUNTER
1236: placed call to patient's wife, notified of low digoxin level. Patient's wife confirmed patient has been taking one pill of digoxin 125mcg daily.       Lamar Nolasco, SHAQ - NP  Ellen Monge, RN  Caller: Unspecified (Today, 12:36 PM)  Ok.  Please have him increase his digoxin to 250mcg on Tuesdays and Thursdays, and keep the other days at 125mcg.   Recheck a dig level with his next set of labs, please.      Reviewed digoxin level with RAYMON Nolasco who ordered VORB patient to increase digoxin dose to 2 tablets Tuesdays and Thursdays and 1 tablet all other day.     1457: called and spoke with patient's wife. Educated on provider instructions to increase digoxin dose to 2 tablets Tuesdays and Thursdays and 1 tablet all other day. She verbalized understanding     Requested Prescriptions     Signed Prescriptions Disp Refills    digoxin (LANOXIN) 125 MCG tablet 30 tablet 2     Sig: Take two tablets by mouth on Tuesdays and Thursdays. Take 1 tablet by mouth all other days.     Authorizing Provider: LAMAR NOLASCO     Ordering User: ELLEN MONGE

## 2024-07-31 ENCOUNTER — ANTI-COAG VISIT (OUTPATIENT)
Age: 79
End: 2024-07-31

## 2024-07-31 DIAGNOSIS — Z79.01 CHRONIC ANTICOAGULATION: Primary | ICD-10-CM

## 2024-07-31 LAB — INR BLD: 1.8

## 2024-07-31 NOTE — PROGRESS NOTES
ADVANCED HEART FAILURE CENTER  [facility] in [place], [place]      INR result reviewed with JORDEN Molina NP  who made the following recommendations (VORB): no changes and recheck Monday 08/05  Patient's wife notified and verbalized understanding. They had no further questions. (See anticoag tracker)     Ellen Monge RN

## 2024-08-02 ENCOUNTER — TELEPHONE (OUTPATIENT)
Age: 79
End: 2024-08-02

## 2024-08-05 ENCOUNTER — ANTI-COAG VISIT (OUTPATIENT)
Age: 79
End: 2024-08-05

## 2024-08-05 DIAGNOSIS — Z79.01 CHRONIC ANTICOAGULATION: Primary | ICD-10-CM

## 2024-08-05 DIAGNOSIS — I50.20 HFREF (HEART FAILURE WITH REDUCED EJECTION FRACTION) (HCC): ICD-10-CM

## 2024-08-05 DIAGNOSIS — R06.02 SHORTNESS OF BREATH: ICD-10-CM

## 2024-08-05 DIAGNOSIS — E83.42 HYPOMAGNESEMIA: ICD-10-CM

## 2024-08-05 DIAGNOSIS — I50.22 CHRONIC SYSTOLIC HEART FAILURE (HCC): ICD-10-CM

## 2024-08-05 DIAGNOSIS — Z79.899 HIGH RISK MEDICATION USE: ICD-10-CM

## 2024-08-05 LAB — INR BLD: 2

## 2024-08-05 NOTE — PROGRESS NOTES
ADVANCED HEART FAILURE CENTER  [facility] in [place], [place]      INR result reviewed with RAYMON Hughes NP who made the following recommendations (VORB): no changes and recheck 1 week. Patient's wife notified and verbalized understanding. They had no further questions. (See anticoag tracker)     Ellen Monge RN

## 2024-08-06 ENCOUNTER — TELEPHONE (OUTPATIENT)
Age: 79
End: 2024-08-06

## 2024-08-06 NOTE — TELEPHONE ENCOUNTER
1106: received call from patient's wife requesting to clarify patient's digoxin dosing.      1349: returned call to patients wife. Confirmed with patient's wife digoxin dose 250mcg on Tuesdays and Thursdays, and keep the other days at 125mcg. She read back for confirmation. Also reminded to schedule overdue echo, patient's wife confirmed she will call coordination of care to do this

## 2024-08-09 ENCOUNTER — TELEPHONE (OUTPATIENT)
Age: 79
End: 2024-08-09

## 2024-08-09 NOTE — TELEPHONE ENCOUNTER
Telephone Call RE:  Lab Reminder      Outcome:     [x] Patient's wife verbalizes understanding    [] Unable to reach   [] Left message              []     INR due 8/12/24    ZANDER COYLE RN

## 2024-08-12 ENCOUNTER — ANTI-COAG VISIT (OUTPATIENT)
Age: 79
End: 2024-08-12

## 2024-08-12 DIAGNOSIS — Z79.01 CHRONIC ANTICOAGULATION: Primary | ICD-10-CM

## 2024-08-12 DIAGNOSIS — Z95.811 LVAD (LEFT VENTRICULAR ASSIST DEVICE) PRESENT (HCC): ICD-10-CM

## 2024-08-12 DIAGNOSIS — I50.22 CHRONIC SYSTOLIC HEART FAILURE (HCC): ICD-10-CM

## 2024-08-12 DIAGNOSIS — Z79.899 HIGH RISK MEDICATION USE: ICD-10-CM

## 2024-08-12 LAB — INR BLD: 2.8

## 2024-08-12 NOTE — PROGRESS NOTES
ADVANCED HEART FAILURE CENTER  [facility] in [place], [place]    INR result reviewed with BERTA Kaplan NP who made the following recommendations (VORB): Continue current coumadin maintenance plan and recheck INR with full set of labs in clinic 8/20/24. Patient's wife notified and verbalized understanding. They had no further questions. (See anticoag tracker)     ZANDER COYLE RN

## 2024-08-14 ENCOUNTER — TELEPHONE (OUTPATIENT)
Age: 79
End: 2024-08-14

## 2024-08-19 NOTE — PATIENT INSTRUCTIONS
Medication changes:  Change Bumex to 1 mg (1/2 of 2 mg tablet) every other day and as needed for weight gain of 2-3 lbs over night or 5 lbs in 1 week.     Testing Ordered:  Lab work has been drawn today. You will be contacted with any abnormal results requiring changes to your current plan of care.       Other Recommendations:   Consider having 1 Ensure nutrition supplements daily. Let LVAD team know if you start Ensure as we may need to adjust your Coumadin dose.    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 Your urine should be clear and light yellow straw colored.     Follow up in 2 months with [facility].      Monthly LVAD Education Tip:    Using the Shower Bag  Although the external components of the HeartMate 3 Left Ventricular Assist System  are moisture-resistant, they are not waterproof. Take care to protect system  components from water or moisture, whether indoors showering or outdoors in a  heavy rain. If the components have contact with water or moisture, you may receive  a serious electrical shock or the pump may stop.    You cannot take tub baths with the pump, but you may be able to shower after the  Driveline exit site heals. Your doctor decides if you can shower. Do not shower  without your doctor’s approval. After you are approved for showering, you must  use the Shower Bag for every shower. It protects the outside parts of the system from  water and moisture      The Shower Bag has a see-through top panel. This lets you view the  ’s user interface while showering. The Driveline exits the Shower  Bag through double zippers along the side. The Shower Bag has an adjustable  shoulder strap and a waist strap. Adjust the straps as needed. The Shower Bag  should be placed so that it does not pull on

## 2024-08-20 ENCOUNTER — OFFICE VISIT (OUTPATIENT)
Age: 79
End: 2024-08-20

## 2024-08-20 VITALS
BODY MASS INDEX: 26.14 KG/M2 | TEMPERATURE: 97.3 F | RESPIRATION RATE: 18 BRPM | WEIGHT: 193 LBS | SYSTOLIC BLOOD PRESSURE: 68 MMHG | HEART RATE: 72 BPM | OXYGEN SATURATION: 97 % | HEIGHT: 72 IN

## 2024-08-20 DIAGNOSIS — I50.22 CHRONIC SYSTOLIC HEART FAILURE (HCC): ICD-10-CM

## 2024-08-20 DIAGNOSIS — R06.02 SHORTNESS OF BREATH: ICD-10-CM

## 2024-08-20 DIAGNOSIS — Z95.811 LVAD (LEFT VENTRICULAR ASSIST DEVICE) PRESENT (HCC): ICD-10-CM

## 2024-08-20 DIAGNOSIS — Z79.899 HIGH RISK MEDICATION USE: ICD-10-CM

## 2024-08-20 DIAGNOSIS — Z79.01 CHRONIC ANTICOAGULATION: Primary | ICD-10-CM

## 2024-08-20 DIAGNOSIS — E83.42 HYPOMAGNESEMIA: ICD-10-CM

## 2024-08-20 RX ORDER — BUMETANIDE 2 MG/1
1 TABLET ORAL EVERY OTHER DAY
Qty: 30 TABLET | Refills: 1 | Status: SHIPPED | OUTPATIENT
Start: 2024-08-20

## 2024-08-20 ASSESSMENT — PATIENT HEALTH QUESTIONNAIRE - PHQ9
SUM OF ALL RESPONSES TO PHQ QUESTIONS 1-9: 0
1. LITTLE INTEREST OR PLEASURE IN DOING THINGS: NOT AT ALL
SUM OF ALL RESPONSES TO PHQ QUESTIONS 1-9: 0
SUM OF ALL RESPONSES TO PHQ9 QUESTIONS 1 & 2: 0
SUM OF ALL RESPONSES TO PHQ QUESTIONS 1-9: 0
SUM OF ALL RESPONSES TO PHQ QUESTIONS 1-9: 0
2. FEELING DOWN, DEPRESSED OR HOPELESS: NOT AT ALL

## 2024-08-20 NOTE — PROGRESS NOTES
ADVANCED HEART FAILURE CENTER  [facility] in [place], [place]  LVAD Coordinator Clinic Visit Note    Chief Complaint   Patient presents with    Follow-up     VAD       BP (!) 68/0 (Site: Right Upper Arm, Position: Sitting, Cuff Size: Medium Adult)   Pulse 72   Temp 97.3 °F (36.3 °C) (Temporal)   Resp 18   Ht 1.829 m (6')   Wt 87.5 kg (193 lb)   SpO2 97%   BMI 26.18 kg/m²      LVAD  LVAD Type:: Left Ventricular Assist Device (LVAD)  Pump Speed (rpm): 4800  Pump Flow (lpm): 4  MAP (mmHg): 68  Pump Pulse Index (PI): 5.8  Pump Power (Ji): 3.3         Bimal Chavira Sr is a 79 y.o. male with a history of ICM with Heartmate 3 implant date of 4/5/2022.  Patient was seen in clinic for routine follow up with Dr. Everardo Aguirre. LVAD interrogation completed in clinic. Notable for frequent PI events. Denies LVAD alarms at home.  See flow sheet for details. Interrogation reported to Dr. Aguirre.    Patient denied s/s of driveline infection or driveline trauma (including  drops).    Labs drawn and sent to [facility].     All orders entered per VORB. All provider instructions placed in AVS and reviewed with patient. LVAD education provided on Showering with LVAD and AHA diet. Time given to ask questions. Patient verbalized understanding and had no further questions.     SEAN HERNANDEZ RN  VAD Coordinator              
Other         CAD    Diabetes Maternal Grandmother     Cancer Father         lung    Lung Disease Father     Heart Disease Father         CAD & PVD    Stroke Sister     Heart Disease Mother         MI       SOCIAL HISTORY:  Social History     Socioeconomic History    Marital status:      Spouse name: None    Number of children: None    Years of education: None    Highest education level: None   Tobacco Use    Smoking status: Never    Smokeless tobacco: Never   Vaping Use    Vaping status: Never Used   Substance and Sexual Activity    Alcohol use: Yes     Alcohol/week: 0.0 standard drinks of alcohol    Drug use: No    Sexual activity: Defer     Social Determinants of Health     Financial Resource Strain: Low Risk  (11/7/2023)    Overall Financial Resource Strain (CARDIA)     Difficulty of Paying Living Expenses: Not very hard   Transportation Needs: Unknown (11/7/2023)    PRAPARE - Transportation     Lack of Transportation (Non-Medical): No   Physical Activity: Insufficiently Active (11/7/2023)    Exercise Vital Sign     Days of Exercise per Week: 2 days     Minutes of Exercise per Session: 20 min   Housing Stability: Unknown (11/7/2023)    Housing Stability Vital Sign     Unstable Housing in the Last Year: No       LABORATORY RESULTS:  Lab Results   Component Value Date/Time     07/15/2024 02:11 PM    K 4.7 07/15/2024 02:11 PM     07/15/2024 02:11 PM    CO2 24 07/15/2024 02:11 PM    BUN 16 07/15/2024 02:11 PM    GFRAA >60 06/27/2022 03:11 PM    GLOB 4.0 06/27/2022 03:11 PM    ALT 8 07/15/2024 02:11 PM    AST 32 07/15/2024 02:11 PM       Lab Results   Component Value Date/Time    WBC 3.4 07/15/2024 02:11 PM    HGB 12.6 07/15/2024 02:11 PM    HCT 40.7 07/15/2024 02:11 PM     07/15/2024 02:11 PM    MCV 82 07/15/2024 02:11 PM    INR 2.80 08/12/2024 12:00 AM    INR 2.4 06/16/2022 10:12 AM       ALLERGY:  Allergies   Allergen Reactions    Oxycodone Anaphylaxis    Ace Inhibitors     Beta

## 2024-08-21 ENCOUNTER — ANTI-COAG VISIT (OUTPATIENT)
Age: 79
End: 2024-08-21

## 2024-08-21 DIAGNOSIS — Z79.01 LONG TERM (CURRENT) USE OF ANTICOAGULANTS: Primary | ICD-10-CM

## 2024-08-21 LAB
ALBUMIN SERPL-MCNC: 4.3 G/DL (ref 3.8–4.8)
ALP SERPL-CCNC: 140 IU/L (ref 44–121)
ALT SERPL-CCNC: 6 IU/L (ref 0–44)
AST SERPL-CCNC: 31 IU/L (ref 0–40)
BASOPHILS # BLD AUTO: 0 X10E3/UL (ref 0–0.2)
BASOPHILS NFR BLD AUTO: 1 %
BILIRUB SERPL-MCNC: 0.5 MG/DL (ref 0–1.2)
BUN SERPL-MCNC: 13 MG/DL (ref 8–27)
BUN/CREAT SERPL: 12 (ref 10–24)
CALCIUM SERPL-MCNC: 9.1 MG/DL (ref 8.6–10.2)
CHLORIDE SERPL-SCNC: 102 MMOL/L (ref 96–106)
CO2 SERPL-SCNC: 24 MMOL/L (ref 20–29)
CREAT SERPL-MCNC: 1.12 MG/DL (ref 0.76–1.27)
DIGOXIN SERPL-MCNC: 0.7 NG/ML (ref 0.5–0.9)
EGFRCR SERPLBLD CKD-EPI 2021: 67 ML/MIN/1.73
EOSINOPHIL # BLD AUTO: 0 X10E3/UL (ref 0–0.4)
EOSINOPHIL NFR BLD AUTO: 1 %
ERYTHROCYTE [DISTWIDTH] IN BLOOD BY AUTOMATED COUNT: 14.6 % (ref 11.6–15.4)
GALECTIN 3 SERPL-MCNC: 18.1 NG/ML
GLOBULIN SER CALC-MCNC: 2.6 G/DL (ref 1.5–4.5)
GLUCOSE SERPL-MCNC: 94 MG/DL (ref 70–99)
HCT VFR BLD AUTO: 43.2 % (ref 37.5–51)
HGB BLD-MCNC: 13.4 G/DL (ref 13–17.7)
IMM GRANULOCYTES # BLD AUTO: 0 X10E3/UL (ref 0–0.1)
IMM GRANULOCYTES NFR BLD AUTO: 0 %
INR PPP: 2.2 (ref 0.9–1.2)
INTERPRETATION: ABNORMAL
LDH SERPL L TO P-CCNC: 157 IU/L (ref 121–224)
LYMPHOCYTES # BLD AUTO: 0.6 X10E3/UL (ref 0.7–3.1)
LYMPHOCYTES NFR BLD AUTO: 21 %
MAGNESIUM SERPL-MCNC: 2.2 MG/DL (ref 1.6–2.3)
MCH RBC QN AUTO: 25.4 PG (ref 26.6–33)
MCHC RBC AUTO-ENTMCNC: 31 G/DL (ref 31.5–35.7)
MCV RBC AUTO: 82 FL (ref 79–97)
MONOCYTES # BLD AUTO: 0.3 X10E3/UL (ref 0.1–0.9)
MONOCYTES NFR BLD AUTO: 11 %
NEUTROPHILS # BLD AUTO: 1.8 X10E3/UL (ref 1.4–7)
NEUTROPHILS NFR BLD AUTO: 66 %
NT-PROBNP SERPL-MCNC: 811 PG/ML (ref 0–486)
PLATELET # BLD AUTO: 208 X10E3/UL (ref 150–450)
POTASSIUM SERPL-SCNC: 4.6 MMOL/L (ref 3.5–5.2)
PROT SERPL-MCNC: 6.9 G/DL (ref 6–8.5)
PROTHROMBIN TIME: 22.8 SEC (ref 9.1–12)
RBC # BLD AUTO: 5.28 X10E6/UL (ref 4.14–5.8)
SODIUM SERPL-SCNC: 141 MMOL/L (ref 134–144)
WBC # BLD AUTO: 2.8 X10E3/UL (ref 3.4–10.8)

## 2024-08-21 NOTE — PROGRESS NOTES
ADVANCED HEART FAILURE CENTER  [facility] in [place], [place]      INR result reviewed with BERTA Kaplan NP who made the following recommendations (VORB): no change to coumadin dosing and recheck INR in 1 week on 8/27/24. Patient's wife notified and verbalized understanding. She had no further questions. (See anticoag tracker)     SEAN HERNANDEZ RN  VAD Coordinator

## 2024-08-21 NOTE — RESULT ENCOUNTER NOTE
BMP shows normal creatinine 1.1 borderline elevated alkaline phosphatase but will continue to monitor INR 2.2 continue same treatment  WBC borderline low will continue to monitor hemoglobin normal digoxin level normal LD normal

## 2024-08-22 ENCOUNTER — TELEPHONE (OUTPATIENT)
Age: 79
End: 2024-08-22

## 2024-08-22 NOTE — RESULT ENCOUNTER NOTE
Galectin 3 to be levels are normal--it is a prognostic marker for heart failure hospitalizations and mortality continue to monitor  proBNP mildly elevated continue same treatment

## 2024-08-22 NOTE — TELEPHONE ENCOUNTER
1054: call placed to [facility] as no ntBNP resulted in epic. Spoke with Gilberto who reported nt BNP was run with result of 811. Stated she will refax lab results to [facility] to include this result. Dr. Aguirre notified of result.     Reviewed with Dr. Aguirre who recommended no changes at this time based off of labwork.     1145: Call placed to patient's wife. Notified of lab work and provider recommendation for no changes at this time. She verbalized understanding.

## 2024-08-22 NOTE — TELEPHONE ENCOUNTER
Janak Aguirre MD Pasti, Megan, RN  Good to know thanks          Previous Messages       ----- Message -----  From: Ellen Monge RN  Sent: 8/22/2024  11:06 AM EDT  To: Janak Aguirre MD    I spoke with [facility]- his bnp was 811. (Decreased from last month)  ----- Message -----  From: Ellen Monge RN  Sent: 8/22/2024   8:53 AM EDT  To: Ellen Monge RN      ----- Message -----  From: Janak Aguirre MD  Sent: 8/21/2024   5:22 PM EDT  To: Marie Johnston RN    BMP shows normal creatinine 1.1 borderline elevated alkaline phosphatase but will continue to monitor INR 2.2 continue same treatment  WBC borderline low will continue to monitor hemoglobin normal digoxin level normal LD normal      Kaylin Conte RN Pasti, Megan, RN         Previous Messages       ----- Message -----  From: Janak Aguirre MD  Sent: 8/22/2024  12:49 PM EDT  To: Marie Johnston RN; Kaylin Conte RN    Galectin 3 to be levels are normal--it is a prognostic marker for heart failure hospitalizations and mortality continue to monitor  proBNP mildly elevated continue same treatment

## 2024-08-26 ENCOUNTER — TELEPHONE (OUTPATIENT)
Age: 79
End: 2024-08-26

## 2024-08-26 NOTE — TELEPHONE ENCOUNTER
Telephone Call RE:  Lab Reminder      Outcome:     [x] Patient's wife verbalizes understanding    [] Unable to reach   [] Left message              []       INR due 8/27/24.    ZANDER COYLE RN

## 2024-08-28 ENCOUNTER — HOSPITAL ENCOUNTER (OUTPATIENT)
Facility: HOSPITAL | Age: 79
Discharge: HOME | End: 2024-08-31
Attending: INTERNAL MEDICINE
Payer: MEDICARE

## 2024-08-28 ENCOUNTER — HOSPITAL ENCOUNTER (OUTPATIENT)
Facility: HOSPITAL | Age: 79
Discharge: HOME | End: 2024-08-30
Attending: INTERNAL MEDICINE
Payer: MEDICARE

## 2024-08-28 DIAGNOSIS — C34.12 SQUAMOUS CELL CARCINOMA OF BRONCHUS IN LEFT UPPER LOBE (HCC): ICD-10-CM

## 2024-08-28 DIAGNOSIS — I50.22 CHRONIC SYSTOLIC HEART FAILURE (HCC): ICD-10-CM

## 2024-08-28 LAB
ECHO LA DIAMETER: 3.2 CM
ECHO LV EF PHYSICIAN: 18 %
ECHO LV FRACTIONAL SHORTENING: 32 % (ref 28–44)
ECHO LV INTERNAL DIMENSION DIASTOLIC: 5.3 CM (ref 4.2–5.9)
ECHO LV INTERNAL DIMENSION SYSTOLIC: 3.6 CM
ECHO LV IVSD: 0.9 CM (ref 0.6–1)
ECHO LV MASS 2D: 174.5 G (ref 88–224)
ECHO LV POSTERIOR WALL DIASTOLIC: 0.9 CM (ref 0.6–1)
ECHO LV RELATIVE WALL THICKNESS RATIO: 0.34
ECHO RV INTERNAL DIMENSION: 5 CM
ECHO RV TAPSE: 1.8 CM (ref 1.7–?)
ECHO TV REGURGITANT MAX VELOCITY: 2.51 M/S
ECHO TV REGURGITANT PEAK GRADIENT: 25 MMHG

## 2024-08-28 PROCEDURE — 93306 TTE W/DOPPLER COMPLETE: CPT | Performed by: INTERNAL MEDICINE

## 2024-08-28 PROCEDURE — 6360000004 HC RX CONTRAST MEDICATION: Performed by: RADIOLOGY

## 2024-08-28 PROCEDURE — 74160 CT ABDOMEN W/CONTRAST: CPT

## 2024-08-28 PROCEDURE — 93306 TTE W/DOPPLER COMPLETE: CPT

## 2024-08-28 RX ORDER — IOPAMIDOL 755 MG/ML
100 INJECTION, SOLUTION INTRAVASCULAR
Status: COMPLETED | OUTPATIENT
Start: 2024-08-28 | End: 2024-08-28

## 2024-08-28 RX ADMIN — IOPAMIDOL 100 ML: 755 INJECTION, SOLUTION INTRAVENOUS at 13:22

## 2024-08-29 ENCOUNTER — TELEPHONE (OUTPATIENT)
Age: 79
End: 2024-08-29

## 2024-08-29 NOTE — TELEPHONE ENCOUNTER
1227: placed call to patient regarding overdue INR. Patient's wife stated they are on the way to the hospital now to see a sick relative who is in critical condition. Patient stated he can't remember if he checked inr Tuesday due to recent family stress. Patient's wife stated they will check inr tomorrow.

## 2024-08-30 ENCOUNTER — ANTI-COAG VISIT (OUTPATIENT)
Age: 79
End: 2024-08-30

## 2024-08-30 DIAGNOSIS — Z79.01 LONG TERM (CURRENT) USE OF ANTICOAGULANTS: Primary | ICD-10-CM

## 2024-08-30 LAB — INR BLD: 1.9

## 2024-08-30 NOTE — PROGRESS NOTES
ADVANCED HEART FAILURE CENTER  [facility] in [place], [place]      INR result reviewed with JORDEN Molina NP  who made the following recommendations (VORB): no change to coumadin dosing and recheck INR in 1 week on 9/3/24. Patient's wife notified and verbalized understanding. She had no further questions. (See anticoag tracker)     SEAN HERNANDEZ RN  VAD Coordinator

## 2024-09-03 ENCOUNTER — ANTI-COAG VISIT (OUTPATIENT)
Age: 79
End: 2024-09-03

## 2024-09-03 DIAGNOSIS — Z79.01 CHRONIC ANTICOAGULATION: Primary | ICD-10-CM

## 2024-09-03 LAB — INR BLD: 1.8

## 2024-09-03 NOTE — PROGRESS NOTES
ADVANCED HEART FAILURE CENTER  [facility] in [place], [place]      INR result reviewed with JORDEN Molina NP  who made the following recommendations (VORB): 7.5mg tonight and recheck 1 week. Patient's wife notified and verbalized understanding. They had no further questions. (See anticoag tracker)     Ellen Monge RN

## 2024-09-10 ENCOUNTER — TELEPHONE (OUTPATIENT)
Age: 79
End: 2024-09-10

## 2024-09-10 ENCOUNTER — ANTI-COAG VISIT (OUTPATIENT)
Age: 79
End: 2024-09-10

## 2024-09-10 DIAGNOSIS — Z79.01 CHRONIC ANTICOAGULATION: Primary | ICD-10-CM

## 2024-09-10 LAB — INR BLD: 2

## 2024-09-11 RX ORDER — BUMETANIDE 2 MG/1
1 TABLET ORAL PRN
Qty: 30 TABLET | Refills: 1
Start: 2024-09-11

## 2024-09-16 ENCOUNTER — TELEPHONE (OUTPATIENT)
Age: 79
End: 2024-09-16

## 2024-09-17 ENCOUNTER — ANTI-COAG VISIT (OUTPATIENT)
Age: 79
End: 2024-09-17
Payer: MEDICARE

## 2024-09-17 DIAGNOSIS — Z79.01 CHRONIC ANTICOAGULATION: Primary | ICD-10-CM

## 2024-09-17 LAB — INR BLD: 2.5

## 2024-09-17 PROCEDURE — 93793 ANTICOAG MGMT PT WARFARIN: CPT | Performed by: NURSE PRACTITIONER

## 2024-09-23 ENCOUNTER — TELEPHONE (OUTPATIENT)
Age: 79
End: 2024-09-23

## 2024-09-23 DIAGNOSIS — Z79.01 LONG TERM (CURRENT) USE OF ANTICOAGULANTS: Primary | ICD-10-CM

## 2024-09-23 DIAGNOSIS — Z79.01 CHRONIC ANTICOAGULATION: ICD-10-CM

## 2024-09-23 DIAGNOSIS — E83.42 HYPOMAGNESEMIA: ICD-10-CM

## 2024-09-23 DIAGNOSIS — R06.02 SHORTNESS OF BREATH: ICD-10-CM

## 2024-09-23 DIAGNOSIS — Z79.01 LONG TERM (CURRENT) USE OF ANTICOAGULANTS: ICD-10-CM

## 2024-09-23 DIAGNOSIS — Z79.899 HIGH RISK MEDICATION USE: ICD-10-CM

## 2024-09-23 DIAGNOSIS — I50.22 CHRONIC SYSTOLIC HEART FAILURE (HCC): ICD-10-CM

## 2024-09-24 LAB
BASOPHILS # BLD AUTO: 0 X10E3/UL (ref 0–0.2)
BASOPHILS NFR BLD AUTO: 1 %
EOSINOPHIL # BLD AUTO: 0.1 X10E3/UL (ref 0–0.4)
EOSINOPHIL NFR BLD AUTO: 2 %
ERYTHROCYTE [DISTWIDTH] IN BLOOD BY AUTOMATED COUNT: 14.2 % (ref 11.6–15.4)
HCT VFR BLD AUTO: 44.6 % (ref 37.5–51)
HGB BLD-MCNC: 13.6 G/DL (ref 13–17.7)
IMM GRANULOCYTES # BLD AUTO: 0 X10E3/UL (ref 0–0.1)
IMM GRANULOCYTES NFR BLD AUTO: 0 %
LYMPHOCYTES # BLD AUTO: 0.7 X10E3/UL (ref 0.7–3.1)
LYMPHOCYTES NFR BLD AUTO: 22 %
MCH RBC QN AUTO: 26.1 PG (ref 26.6–33)
MCHC RBC AUTO-ENTMCNC: 30.5 G/DL (ref 31.5–35.7)
MCV RBC AUTO: 85 FL (ref 79–97)
MONOCYTES # BLD AUTO: 0.4 X10E3/UL (ref 0.1–0.9)
MONOCYTES NFR BLD AUTO: 12 %
NEUTROPHILS # BLD AUTO: 2.1 X10E3/UL (ref 1.4–7)
NEUTROPHILS NFR BLD AUTO: 63 %
PLATELET # BLD AUTO: 196 X10E3/UL (ref 150–450)
RBC # BLD AUTO: 5.22 X10E6/UL (ref 4.14–5.8)
WBC # BLD AUTO: 3.3 X10E3/UL (ref 3.4–10.8)

## 2024-09-25 ENCOUNTER — ANTI-COAG VISIT (OUTPATIENT)
Age: 79
End: 2024-09-25
Payer: MEDICARE

## 2024-09-25 DIAGNOSIS — Z79.01 CHRONIC ANTICOAGULATION: Primary | ICD-10-CM

## 2024-09-25 LAB
ALBUMIN SERPL-MCNC: 4.2 G/DL (ref 3.8–4.8)
ALP SERPL-CCNC: 142 IU/L (ref 44–121)
ALT SERPL-CCNC: 6 IU/L (ref 0–44)
AST SERPL-CCNC: 31 IU/L (ref 0–40)
BILIRUB SERPL-MCNC: 0.5 MG/DL (ref 0–1.2)
BUN SERPL-MCNC: 10 MG/DL (ref 8–27)
BUN/CREAT SERPL: 10 (ref 10–24)
CALCIUM SERPL-MCNC: 9.1 MG/DL (ref 8.6–10.2)
CHLORIDE SERPL-SCNC: 104 MMOL/L (ref 96–106)
CO2 SERPL-SCNC: 25 MMOL/L (ref 20–29)
CREAT SERPL-MCNC: 1.05 MG/DL (ref 0.76–1.27)
DIGOXIN SERPL-MCNC: 0.9 NG/ML (ref 0.5–0.9)
EGFRCR SERPLBLD CKD-EPI 2021: 73 ML/MIN/1.73
GLOBULIN SER CALC-MCNC: 2.5 G/DL (ref 1.5–4.5)
GLUCOSE SERPL-MCNC: 100 MG/DL (ref 70–99)
INR PPP: 1.6 (ref 0.9–1.2)
LDH SERPL L TO P-CCNC: 157 IU/L (ref 121–224)
MAGNESIUM SERPL-MCNC: 2.2 MG/DL (ref 1.6–2.3)
NT-PROBNP SERPL-MCNC: 1146 PG/ML (ref 0–486)
POTASSIUM SERPL-SCNC: 4.5 MMOL/L (ref 3.5–5.2)
PROT SERPL-MCNC: 6.7 G/DL (ref 6–8.5)
PROTHROMBIN TIME: 17.3 SEC (ref 9.1–12)
SODIUM SERPL-SCNC: 142 MMOL/L (ref 134–144)

## 2024-09-25 PROCEDURE — 93793 ANTICOAG MGMT PT WARFARIN: CPT | Performed by: NURSE PRACTITIONER

## 2024-09-25 RX ORDER — ENOXAPARIN SODIUM 100 MG/ML
1 INJECTION SUBCUTANEOUS 2 TIMES DAILY
Qty: 12 EACH | Refills: 0 | Status: SHIPPED | OUTPATIENT
Start: 2024-09-25

## 2024-09-26 ENCOUNTER — HOSPITAL ENCOUNTER (OUTPATIENT)
Facility: HOSPITAL | Age: 79
Discharge: HOME | End: 2024-09-29
Attending: INTERNAL MEDICINE
Payer: MEDICARE

## 2024-09-26 ENCOUNTER — TELEPHONE (OUTPATIENT)
Age: 79
End: 2024-09-26

## 2024-09-26 DIAGNOSIS — R06.00 DYSPNEA, UNSPECIFIED TYPE: ICD-10-CM

## 2024-09-26 DIAGNOSIS — C34.12 SQUAMOUS CELL CARCINOMA OF BRONCHUS IN LEFT UPPER LOBE (HCC): ICD-10-CM

## 2024-09-26 DIAGNOSIS — D50.9 IRON DEFICIENCY ANEMIA, UNSPECIFIED IRON DEFICIENCY ANEMIA TYPE: ICD-10-CM

## 2024-09-26 LAB
GLUCOSE BLD STRIP.AUTO-MCNC: 97 MG/DL (ref 65–117)
SERVICE CMNT-IMP: NORMAL

## 2024-09-26 PROCEDURE — A9609 HC RX DIAGNOSTIC RADIOPHARMACEUTICAL: HCPCS | Performed by: INTERNAL MEDICINE

## 2024-09-26 PROCEDURE — 82962 GLUCOSE BLOOD TEST: CPT

## 2024-09-26 PROCEDURE — 78815 PET IMAGE W/CT SKULL-THIGH: CPT

## 2024-09-26 PROCEDURE — 3430000000 HC RX DIAGNOSTIC RADIOPHARMACEUTICAL: Performed by: INTERNAL MEDICINE

## 2024-09-26 RX ORDER — FLUDEOXYGLUCOSE F-18 500 MCI/ML
10 INJECTION INTRAVENOUS
Status: COMPLETED | OUTPATIENT
Start: 2024-09-26 | End: 2024-09-26

## 2024-09-26 RX ADMIN — FLUDEOXYGLUCOSE F-18 10 MILLICURIE: 500 INJECTION INTRAVENOUS at 07:54

## 2024-09-27 ENCOUNTER — ANTI-COAG VISIT (OUTPATIENT)
Age: 79
End: 2024-09-27

## 2024-09-27 DIAGNOSIS — Z79.01 CHRONIC ANTICOAGULATION: Primary | ICD-10-CM

## 2024-09-27 LAB — INR BLD: 2.1

## 2024-09-30 ENCOUNTER — TELEPHONE (OUTPATIENT)
Age: 79
End: 2024-09-30

## 2024-09-30 NOTE — TELEPHONE ENCOUNTER
Telephone Call RE:  Lab Reminder      Outcome:     [x] Patient's wife verbalizes understanding    [] Unable to reach   [] Left message              []     Contacted's wife patient regarding LVAD pager system. Notified them that effective October 2, 2024 the LVAD emergency pager ([phone]) will no longer be in service. Instructed them that patients and caregivers should contact the answering service at [phone] to reach the LVAD provider for all after hours needs or patient emergencies. They verbalized understanding.     Ellen Monge, RN

## 2024-10-01 ENCOUNTER — ANTI-COAG VISIT (OUTPATIENT)
Age: 79
End: 2024-10-01

## 2024-10-01 DIAGNOSIS — Z79.01 CHRONIC ANTICOAGULATION: Primary | ICD-10-CM

## 2024-10-01 LAB — INR BLD: 3

## 2024-10-01 NOTE — PROGRESS NOTES
ADVANCED HEART FAILURE CENTER  [facility] in [place], [place]      INR result reviewed with BERTA Kaplan NP who made the following recommendations (VORB): no changes and recheck 1 week. Patient's wife  notified and verbalized understanding. They had no further questions. (See anticoag tracker)     Ellen Monge RN

## 2024-10-07 ENCOUNTER — TELEPHONE (OUTPATIENT)
Age: 79
End: 2024-10-07

## 2024-10-07 NOTE — TELEPHONE ENCOUNTER
Bimal Chavira  is a 79 y.o. male with a history of ICM with Heartmate 3 implant date of 04/05/2022. Patient called and reported low voltage alarms which resolved with changing clips. New clips ordered to ensure safe operation of device per  guidelines.

## 2024-10-07 NOTE — TELEPHONE ENCOUNTER
Telephone Call RE:  Lab Reminder      Outcome:     [x] Patient's wife verbalizes understanding    [] Unable to reach   [] Left message              []     Patient's wife states patient had two alarms last night and when he attempted to self test controller it did not initiate self test. Patient's wife states she is driving with patient in the car. Requested she call back once her and patient are stopped to check last 6 alarms on controller, she verbalized understanding.     1404: patient's wife returned call. Instructed patient to check last 6 alarms on controller. Patient unable to scroll through alarms but able to tell coordinator last alarm was low flow lasting 1 sec.  While one the phone patient began experiencing a \"low voltage\" alarm. Stated his batteries are full. Instructed patient to change batteries and clips. Patient changed batteries and clips with resolution of low voltage alarm.  Patient performed self test and confirmed controller sounded alarms and showed lights. Patient stated he was sitting watching TV when low flow occurred. Confirmed he had taken all his meds at that time. Reports current settings as Speed: 4800, Flow 3.7, PI 5.8     Reviewed with BERTA Kaplan who stated VORB patient to present to clinic for nurse visit today or tomorrow for BP check.     1418: returned call to patient and wife, informed them provider would like patient to come to clinic for BP check. Patient and wife confirmed they can present at 11:30 tomorrow for BP and equipment check. Instructed patient to call after hours number ([phone]) if he has further alarms overnight.  He verbalized understanding.        Ellen Monge RN

## 2024-10-08 ENCOUNTER — NURSE ONLY (OUTPATIENT)
Age: 79
End: 2024-10-08

## 2024-10-08 ENCOUNTER — ANTI-COAG VISIT (OUTPATIENT)
Age: 79
End: 2024-10-08

## 2024-10-08 VITALS — SYSTOLIC BLOOD PRESSURE: 86 MMHG

## 2024-10-08 DIAGNOSIS — Z95.811 LVAD (LEFT VENTRICULAR ASSIST DEVICE) PRESENT (HCC): Primary | ICD-10-CM

## 2024-10-08 DIAGNOSIS — Z79.01 CHRONIC ANTICOAGULATION: Primary | ICD-10-CM

## 2024-10-08 LAB — INR BLD: 2.5

## 2024-10-08 NOTE — PROGRESS NOTES
ADVANCED HEART FAILURE CENTER  [facility] in [place], [place]      INR result reviewed with BERTA Kaplan NP who made the following recommendations (VORB): no changes and recheck 1 week. Patient notified at clinic visit  and verbalized understanding. They had no further questions. (See anticoag tracker)     Ellen Monge RN

## 2024-10-08 NOTE — PROGRESS NOTES
ADVANCED HEART FAILURE CENTER  [facility] in [place], [place]  LVAD Outpatient Nurse Visit    Chief Complaint   Patient presents with    Other     Nurse visit- BP/alarm check          BP (!) 86/0      LVAD  LVAD Type:: Left Ventricular Assist Device (LVAD)  Pump Speed (rpm): 4800  Pump Flow (lpm): 3.8  MAP (mmHg): 82  Pump Pulse Index (PI): 3.2  Pump Power (Ji): 6.6  Battery Life Checked: Yes  Backup Controller Present: Yes  Driveline Dressing: Clean, Dry and Intact  Outpatient: Yes  MAP in Therapeutic Range (Outpatient): Yes       Bimal Chavira Sr is a 79 y.o. male with a history of ICM with Heartmate 3 implant date of 04/05/2022.     Patient was seen in clinic for nurse visit for BP and equipment check. Patient reports no more low flow alarms, states low voltage alarms resolved after he changed his clips.     LVAD interrogation completed. Notable for PI events, low voltage alarms.  Log files sent to [employer]: see below for results.     Reviewed all information including log files with BERTA Kaplan who recommended V.O.R.B No changes at this time, patient to call with any further alarms.     All instructions placed in after visit summary and reviewed with patient. Education provided on calling [facility] if any further alarms occur. Time given to ask questions. Patient verbalized understanding and had no further questions.     LOG FILES:   \"The event log file for HM3 patient [id number] contained 5 Low Flow estimates as well as sustained Low Flow Hazard events when the calculated PI is elevated. These flow readings do not appear to be the result of any external equipment malfunction. The two most common causes of this trend are hypovolemia and hypertension.       Additionally the log contained clusters of presenting PI events as well as routine power source changes. All PI events will cause the HM3 VAD speed to drop to its low speed limit, which is currently set at 4400RPM. Keep in mind that not all PI

## 2024-10-08 NOTE — PATIENT INSTRUCTIONS
head screwdriver on the back panel to remove plate.  Continue to use screwdriver on metal cage that holds the big black battery in place.  Remove big black battery and disconnect from the small clip on the left side. This will save the big black battery from losing power.  After power is restored, you will reconnect the battery to the small clip inside. You will then screw the battery back in the cage and secure the gray outer plate.   Plug power module back into the wall. You should see a green [place] and a yellow battery on the front of the power module. If this does not work, try the procedure again. If it continues to fail after several attempts, call the MCS Coordinator on-call at [phone].    In the event of a power outage and you are connected to your mobile power unit:    Unplug from the wall and ensure you have an extra set of AA batteries in case they need to be replaced.    In the event of an extended power outage and you find yourself with only 2 sets of fully charged batteries left,  it is time to find a place to charge your equipment:  Take ALL equipment (power module and or mobile power unit, , batteries, backup controller, etc.) to a place that definitely has power.  You can use this place to recharge your batteries.  You can also reassemble your power module and keep it plugged-in at this location.   If you can stay overnight, hook up to your power module to preserve your battery life.  IF YOU CANNOT DRIVE, CALL 911.   Explain that you have a “Left Ventricular Assist Device--an electrically powered heart pump.”  Tell dispatcher that you will need transportation to a place with power (i.e. EMS/Fire station or hospital) and that it is life-threatening.   If you have any issues, call the MCS Coordinator on-call at [phone].      For further patient and caregiver resources as well as access to online LVAD support groups visit [email or web address]       Please bring your daily sheets

## 2024-10-14 ENCOUNTER — TELEPHONE (OUTPATIENT)
Age: 79
End: 2024-10-14

## 2024-10-15 ENCOUNTER — ANTI-COAG VISIT (OUTPATIENT)
Age: 79
End: 2024-10-15
Payer: MEDICARE

## 2024-10-15 DIAGNOSIS — Z95.811 LVAD (LEFT VENTRICULAR ASSIST DEVICE) PRESENT (HCC): ICD-10-CM

## 2024-10-15 DIAGNOSIS — Z79.01 CHRONIC ANTICOAGULATION: Primary | ICD-10-CM

## 2024-10-15 DIAGNOSIS — I50.22 CHRONIC SYSTOLIC HEART FAILURE (HCC): ICD-10-CM

## 2024-10-15 DIAGNOSIS — Z79.01 LONG TERM (CURRENT) USE OF ANTICOAGULANTS: ICD-10-CM

## 2024-10-15 LAB — INR BLD: 2.2

## 2024-10-15 PROCEDURE — 93793 ANTICOAG MGMT PT WARFARIN: CPT | Performed by: NURSE PRACTITIONER

## 2024-10-15 NOTE — PROGRESS NOTES
ADVANCED HEART FAILURE CENTER  [facility] in [place], [place]      INR result reviewed with JORDEN Molina NP  who made the following recommendations (VORB): Continue current coumadin maintenance plan and recheck INR with full set of labs in clinic on 10/24/24. Patient's wife notified and verbalized understanding. They had no further questions. (See anticoag tracker)     ZANDER COYLE RN  VAD Coordinator

## 2024-10-17 ENCOUNTER — IMMUNIZATION (OUTPATIENT)
Age: 79
End: 2024-10-17

## 2024-10-17 PROCEDURE — G0008 ADMIN INFLUENZA VIRUS VAC: HCPCS | Performed by: PEDIATRICS

## 2024-10-17 PROCEDURE — 90653 IIV ADJUVANT VACCINE IM: CPT | Performed by: PEDIATRICS

## 2024-10-21 ENCOUNTER — TELEPHONE (OUTPATIENT)
Age: 79
End: 2024-10-21

## 2024-10-21 NOTE — TELEPHONE ENCOUNTER
1141: Called to notify patient's wife that ordered clips arrived and can be picked up any time during business hours, patient's wife verbalized understanding.

## 2024-10-22 ENCOUNTER — TELEPHONE (OUTPATIENT)
Age: 79
End: 2024-10-22

## 2024-10-24 ENCOUNTER — OFFICE VISIT (OUTPATIENT)
Age: 79
End: 2024-10-24

## 2024-10-24 VITALS
SYSTOLIC BLOOD PRESSURE: 84 MMHG | BODY MASS INDEX: 26.14 KG/M2 | WEIGHT: 193 LBS | HEIGHT: 72 IN | OXYGEN SATURATION: 97 % | RESPIRATION RATE: 18 BRPM | TEMPERATURE: 98.2 F | HEART RATE: 64 BPM

## 2024-10-24 DIAGNOSIS — R06.02 SHORTNESS OF BREATH: ICD-10-CM

## 2024-10-24 DIAGNOSIS — I50.20 HFREF (HEART FAILURE WITH REDUCED EJECTION FRACTION) (HCC): ICD-10-CM

## 2024-10-24 DIAGNOSIS — Z79.01 CHRONIC ANTICOAGULATION: Primary | ICD-10-CM

## 2024-10-24 DIAGNOSIS — Z95.811 LVAD (LEFT VENTRICULAR ASSIST DEVICE) PRESENT (HCC): ICD-10-CM

## 2024-10-24 DIAGNOSIS — E61.1 IRON DEFICIENCY: ICD-10-CM

## 2024-10-24 DIAGNOSIS — Z79.899 HIGH RISK MEDICATION USE: ICD-10-CM

## 2024-10-24 LAB
DISTANCE WALKED: NORMAL
SPO2: NORMAL

## 2024-10-24 RX ORDER — BUMETANIDE 1 MG/1
1 TABLET ORAL PRN
Qty: 30 TABLET | Refills: 2 | Status: SHIPPED | OUTPATIENT
Start: 2024-10-24

## 2024-10-24 RX ORDER — POTASSIUM CHLORIDE 750 MG/1
40 TABLET, EXTENDED RELEASE ORAL PRN
Qty: 120 TABLET | Refills: 2 | Status: SHIPPED | OUTPATIENT
Start: 2024-10-24

## 2024-10-24 ASSESSMENT — PATIENT HEALTH QUESTIONNAIRE - PHQ9
SUM OF ALL RESPONSES TO PHQ QUESTIONS 1-9: 0
1. LITTLE INTEREST OR PLEASURE IN DOING THINGS: NOT AT ALL
SUM OF ALL RESPONSES TO PHQ9 QUESTIONS 1 & 2: 0
SUM OF ALL RESPONSES TO PHQ QUESTIONS 1-9: 0
2. FEELING DOWN, DEPRESSED OR HOPELESS: NOT AT ALL

## 2024-10-24 NOTE — PROGRESS NOTES
ADVANCED HEART FAILURE CENTER   in [place], [place]  LVAD Coordinator Clinic Visit Note    Chief Complaint   Patient presents with    Follow-up     VAD       BP (!) 84/0 (Site: Right Upper Arm, Position: Sitting, Cuff Size: Medium Adult)   Pulse 64   Temp 98.2 °F (36.8 °C) (Oral)   Resp 18   Ht 1.829 m (6' 0.01\")   Wt 87.5 kg (193 lb)   SpO2 97%   BMI 26.17 kg/m²      LVAD  LVAD Type:: Left Ventricular Assist Device (LVAD)  Pump Speed (rpm): 4850  Pump Flow (lpm): 3.5  MAP (mmHg): 84  Pump Pulse Index (PI): 6.8  Pump Power (Ji): 3.1  Battery Life Checked: Yes  Backup Controller Present: Yes  Driveline Dressing: Clean, Dry and Intact  Outpatient: Yes  MAP in Therapeutic Range (Outpatient): Yes       Bimal Chavira Sr is a 79 y.o. male with a history of ICM with Heartmate 3 implant date of 04/05/2022. LVAD interrogation completed in clinic. Alarm history reviewed. Please see VAD flow sheet for readings. Very Frequent  PI events noted. One low flow (PI 11.5 at time) noted, no other adverse alarms noted. Settings reviewed, but no changes made.    New clips provided to patient, educated to call for any ongoing LVAD alarms, he verbalized understanding.     Patient denied s/s of driveline infection or driveline trauma (including  drops).  Driveline site clean dry intact.  All information reported to provider.      All orders entered per VORB. All provider instructions placed in AVS and reviewed with patient. LVAD education provided on ESD and inclement weather prep. Educated to call for any continued alarms. Time given to ask questions. Patient verbalized understanding and had no further questions.       Ellen Monge RN  VAD Coordinator      
[address], [address]  Phone: [phone]  Fax: [phone]    45 minutes of time spent in chart preparation, reviewing recent laboratories, reviewing imaging studies, reviewing physician and advanced practitioner notes, providing heart failure specific education, updating medications and arranging outpatient/follow-up services.  (> 50% spent face-to-face counseling)

## 2024-10-24 NOTE — PATIENT INSTRUCTIONS
Medication changes:    Take potassium only when taking bumex      Please call [facility] if you continue to experience dizziness, particularly if after taking bumex     Testing Ordered:    6 minute walk and intermacs completed in clinic     Lab work has been drawn today. You will be contacted with any abnormal results requiring changes to your current plan of care.      Other Recommendations:     Effective October 2, 2024 the LVAD emergency pager ([phone]) will no longer be in service. Going forward, please contact the answering service at [phone] and request to speak with the Heart Failure provider on-call for all after hours needs or emergencies.     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 Your urine should be clear and light yellow straw colored.       Follow up in 2 months with [facility]      Monthly LVAD Education Tip:    Contacted patient to discuss the following:     In anticipation of the upcoming inclement weather, we would like to communicate an emergency plan regarding your HeartMate II / HeartMate 3 LVAD.  In preparation for the upcoming storm:  Have all batteries fully charged.  Have a cell phone (fully charged) and flashlight available.  Know the contact number for a local place that will definitely have power if you lose power in your home (friend with a generator, fire station, shelter, hospital, etc.).  In the event of a snow emergency, please identify your snow removal plan. If you must shovel the snow yourself please take frequent breaks and keep controller/batteries dry.   In the event of a power outage and you are connected to your power module:  Place yourself onto battery.  If the power does not come back in 1-2 hours, your power module battery is at risk. You have two options to preserve it:  Find

## 2024-10-25 ENCOUNTER — TELEPHONE (OUTPATIENT)
Age: 79
End: 2024-10-25

## 2024-10-25 ENCOUNTER — ANTI-COAG VISIT (OUTPATIENT)
Age: 79
End: 2024-10-25

## 2024-10-25 DIAGNOSIS — Z79.01 CHRONIC ANTICOAGULATION: Primary | ICD-10-CM

## 2024-10-25 LAB
25(OH)D3+25(OH)D2 SERPL-MCNC: 45 NG/ML (ref 30–100)
ALBUMIN SERPL-MCNC: 4.2 G/DL (ref 3.8–4.8)
ALP SERPL-CCNC: 140 IU/L (ref 44–121)
ALT SERPL-CCNC: 7 IU/L (ref 0–44)
AST SERPL-CCNC: 31 IU/L (ref 0–40)
BASOPHILS # BLD AUTO: 0 X10E3/UL (ref 0–0.2)
BASOPHILS NFR BLD AUTO: 1 %
BILIRUB SERPL-MCNC: 0.4 MG/DL (ref 0–1.2)
BUN SERPL-MCNC: 13 MG/DL (ref 8–27)
BUN/CREAT SERPL: 13 (ref 10–24)
CALCIUM SERPL-MCNC: 9.1 MG/DL (ref 8.6–10.2)
CHLORIDE SERPL-SCNC: 101 MMOL/L (ref 96–106)
CO2 SERPL-SCNC: 25 MMOL/L (ref 20–29)
CREAT SERPL-MCNC: 1.01 MG/DL (ref 0.76–1.27)
DIGOXIN SERPL-MCNC: 0.9 NG/ML (ref 0.5–0.9)
EGFRCR SERPLBLD CKD-EPI 2021: 76 ML/MIN/1.73
EOSINOPHIL # BLD AUTO: 0.1 X10E3/UL (ref 0–0.4)
EOSINOPHIL NFR BLD AUTO: 3 %
ERYTHROCYTE [DISTWIDTH] IN BLOOD BY AUTOMATED COUNT: 14.4 % (ref 11.6–15.4)
FERRITIN SERPL-MCNC: 99 NG/ML (ref 30–400)
GLOBULIN SER CALC-MCNC: 2.4 G/DL (ref 1.5–4.5)
GLUCOSE SERPL-MCNC: 102 MG/DL (ref 70–99)
HCT VFR BLD AUTO: 43.2 % (ref 37.5–51)
HGB BLD-MCNC: 13.4 G/DL (ref 13–17.7)
IMM GRANULOCYTES # BLD AUTO: 0 X10E3/UL (ref 0–0.1)
IMM GRANULOCYTES NFR BLD AUTO: 0 %
INR PPP: 2.5 (ref 0.9–1.2)
IRON SATN MFR SERPL: 20 % (ref 15–55)
IRON SERPL-MCNC: 47 UG/DL (ref 38–169)
LDH SERPL L TO P-CCNC: 159 IU/L (ref 121–224)
LYMPHOCYTES # BLD AUTO: 0.8 X10E3/UL (ref 0.7–3.1)
LYMPHOCYTES NFR BLD AUTO: 25 %
MCH RBC QN AUTO: 26 PG (ref 26.6–33)
MCHC RBC AUTO-ENTMCNC: 31 G/DL (ref 31.5–35.7)
MCV RBC AUTO: 84 FL (ref 79–97)
MONOCYTES # BLD AUTO: 0.3 X10E3/UL (ref 0.1–0.9)
MONOCYTES NFR BLD AUTO: 11 %
NEUTROPHILS # BLD AUTO: 1.8 X10E3/UL (ref 1.4–7)
NEUTROPHILS NFR BLD AUTO: 60 %
NT-PROBNP SERPL-MCNC: 645 PG/ML (ref 0–486)
PLATELET # BLD AUTO: 200 X10E3/UL (ref 150–450)
POTASSIUM SERPL-SCNC: 4.2 MMOL/L (ref 3.5–5.2)
PROT SERPL-MCNC: 6.6 G/DL (ref 6–8.5)
PROTHROMBIN TIME: 26 SEC (ref 9.1–12)
RBC # BLD AUTO: 5.16 X10E6/UL (ref 4.14–5.8)
SODIUM SERPL-SCNC: 140 MMOL/L (ref 134–144)
TIBC SERPL-MCNC: 236 UG/DL (ref 250–450)
UIBC SERPL-MCNC: 189 UG/DL (ref 111–343)
WBC # BLD AUTO: 3 X10E3/UL (ref 3.4–10.8)

## 2024-10-25 NOTE — TELEPHONE ENCOUNTER
Patient notified of stable labwork per provider, he verbalized understanding.      Daylin Molina, SHAQ - NP  Ellen Monge RN  Labs with in his normal.          Previous Messages       ----- Message -----  From: Soy Garcia Incoming Amb Ref Lab Orders To [facility]  Sent: 10/25/2024   6:38 AM EDT  To: SHAQ Edwards NP

## 2024-10-25 NOTE — PROGRESS NOTES
ADVANCED HEART FAILURE CENTER  [facility] in [place], [place]      INR result reviewed with JORDEN Molina NP  who made the following recommendations (VORB): no changes and recheck 2 weeks. Patient notified and verbalized understanding. They had no further questions. (See anticoag tracker)     Ellen Monge RN

## 2024-11-06 ENCOUNTER — TELEPHONE (OUTPATIENT)
Age: 79
End: 2024-11-06

## 2024-11-06 NOTE — TELEPHONE ENCOUNTER
Telephone Call RE:  Lab Reminder      Outcome:     [x] Patient's wife verbalizes understanding    [] Unable to reach   [] Left message              []     INR due 11/7/24    ZANDER COYLE RN

## 2024-11-07 ENCOUNTER — ANTI-COAG VISIT (OUTPATIENT)
Age: 79
End: 2024-11-07

## 2024-11-07 DIAGNOSIS — Z79.01 CHRONIC ANTICOAGULATION: Primary | ICD-10-CM

## 2024-11-07 LAB — INR BLD: 2.3

## 2024-11-07 NOTE — PROGRESS NOTES
ADVANCED HEART FAILURE CENTER  [facility] in [place], [place]      INR result reviewed with RAYMON Hughes NP who made the following recommendations (VORB): no changes and recheck 2 weeks. Patient's wife notified and verbalized understanding. They had no further questions. (See anticoag tracker)     Ellen Monge RN

## 2024-11-08 NOTE — TELEPHONE ENCOUNTER
0648: call received from patient's wife stating patient is completely out of Entresto as [facility].     0956: returned call to patient's wife who requested short supply of Entresto sent to [facility] #[id number].      1000: called and spoke with [facility] pharamcy who stated they don't have prescription but accepted a verbal order per Dr. Adeel HASSAN.      1018: received call from pateint's wife. Notified of Entresto sent to [facility]. She reported patient had a short LVAD alarm this morning. Reviewed last 6 alarms, most recent was low flow lasting 2 seconds this morning. Patient's wife confirmed he has not had any Entresto yet today. Reported LVAD speed of 4800, Flow 2.7, PI 10.2. Informed patient's wife patient should take entresto dose and call if alarms do not resolve, she verbalized understanding.     Reviewed above with Dr. Denis who stated VORB patient should take Entresto dose and call if LVAD alarms do not resolve.     1430: call placed to patient's wife, inquired if she was able to  Entresto, requested call back to confirm she was able to pick this up and status of patient's alarms,

## 2024-11-21 NOTE — TELEPHONE ENCOUNTER
I spoke with Kimberli Anthony with [facility] Division of Cardiology ([phone]) regarding patient records request.  She will be forwarding my request to Dr. Allegra Young team and will have them return my call to discuss. No

## 2024-11-27 LAB
NT-PROBNP SERPL-MCNC: 997 PG/ML (ref 0–486)
SPECIMEN STATUS REPORT: NORMAL

## 2024-11-28 LAB
ALBUMIN SERPL-MCNC: 4.1 G/DL (ref 3.8–4.8)
ALP SERPL-CCNC: 155 IU/L (ref 44–121)
ALT SERPL-CCNC: 5 IU/L (ref 0–44)
AST SERPL-CCNC: 29 IU/L (ref 0–40)
BILIRUB SERPL-MCNC: 0.2 MG/DL (ref 0–1.2)
BUN SERPL-MCNC: 12 MG/DL (ref 8–27)
BUN/CREAT SERPL: 12 (ref 10–24)
CALCIUM SERPL-MCNC: 9.2 MG/DL (ref 8.6–10.2)
CHLORIDE SERPL-SCNC: 103 MMOL/L (ref 96–106)
CO2 SERPL-SCNC: 24 MMOL/L (ref 20–29)
CREAT SERPL-MCNC: 1 MG/DL (ref 0.76–1.27)
DIGOXIN SERPL-MCNC: 0.5 NG/ML (ref 0.5–0.9)
EGFRCR SERPLBLD CKD-EPI 2021: 77 ML/MIN/1.73
GLOBULIN SER CALC-MCNC: 2.1 G/DL (ref 1.5–4.5)
GLUCOSE SERPL-MCNC: 91 MG/DL (ref 70–99)
LDH SERPL L TO P-CCNC: 150 IU/L (ref 121–224)
POTASSIUM SERPL-SCNC: 4.4 MMOL/L (ref 3.5–5.2)
PROT SERPL-MCNC: 6.2 G/DL (ref 6–8.5)
SODIUM SERPL-SCNC: 142 MMOL/L (ref 134–144)

## 2024-11-30 LAB
ALBUMIN SERPL-MCNC: 4.1 G/DL (ref 3.8–4.8)
ALP SERPL-CCNC: 153 IU/L (ref 44–121)
ALT SERPL-CCNC: 6 IU/L (ref 0–44)
AST SERPL-CCNC: 29 IU/L (ref 0–40)
BASOPHILS # BLD AUTO: 0 X10E3/UL (ref 0–0.2)
BASOPHILS NFR BLD AUTO: 1 %
BILIRUB SERPL-MCNC: 0.4 MG/DL (ref 0–1.2)
BUN SERPL-MCNC: 13 MG/DL (ref 8–27)
BUN/CREAT SERPL: 13 (ref 10–24)
CALCIUM SERPL-MCNC: 9.1 MG/DL (ref 8.6–10.2)
CHLORIDE SERPL-SCNC: 102 MMOL/L (ref 96–106)
CO2 SERPL-SCNC: 27 MMOL/L (ref 20–29)
CREAT SERPL-MCNC: 1.03 MG/DL (ref 0.76–1.27)
EGFRCR SERPLBLD CKD-EPI 2021: 74 ML/MIN/1.73
EOSINOPHIL # BLD AUTO: 0 X10E3/UL (ref 0–0.4)
EOSINOPHIL NFR BLD AUTO: 1 %
ERYTHROCYTE [DISTWIDTH] IN BLOOD BY AUTOMATED COUNT: 14 % (ref 11.6–15.4)
GLOBULIN SER CALC-MCNC: 2.4 G/DL (ref 1.5–4.5)
GLUCOSE SERPL-MCNC: 110 MG/DL (ref 70–99)
HCT VFR BLD AUTO: 44.3 % (ref 37.5–51)
HGB BLD-MCNC: 13.7 G/DL (ref 13–17.7)
IMM GRANULOCYTES # BLD AUTO: 0 X10E3/UL (ref 0–0.1)
IMM GRANULOCYTES NFR BLD AUTO: 0 %
INR PPP: 2.4 (ref 0.9–1.2)
LDH SERPL L TO P-CCNC: 146 IU/L (ref 121–224)
LYMPHOCYTES # BLD AUTO: 0.7 X10E3/UL (ref 0.7–3.1)
LYMPHOCYTES NFR BLD AUTO: 25 %
MAGNESIUM SERPL-MCNC: 1.9 MG/DL (ref 1.6–2.3)
MCH RBC QN AUTO: 25.9 PG (ref 26.6–33)
MCHC RBC AUTO-ENTMCNC: 30.9 G/DL (ref 31.5–35.7)
MCV RBC AUTO: 84 FL (ref 79–97)
MONOCYTES # BLD AUTO: 0.3 X10E3/UL (ref 0.1–0.9)
MONOCYTES NFR BLD AUTO: 10 %
NEUTROPHILS # BLD AUTO: 1.7 X10E3/UL (ref 1.4–7)
NEUTROPHILS NFR BLD AUTO: 63 %
NT-PROBNP SERPL-MCNC: 1326 PG/ML (ref 0–486)
PLATELET # BLD AUTO: 202 X10E3/UL (ref 150–450)
POTASSIUM SERPL-SCNC: 4.5 MMOL/L (ref 3.5–5.2)
PROT SERPL-MCNC: 6.5 G/DL (ref 6–8.5)
PROTHROMBIN TIME: 25.1 SEC (ref 9.1–12)
RBC # BLD AUTO: 5.28 X10E6/UL (ref 4.14–5.8)
SODIUM SERPL-SCNC: 141 MMOL/L (ref 134–144)
WBC # BLD AUTO: 2.7 X10E3/UL (ref 3.4–10.8)

## 2025-02-14 LAB
ALBUMIN SERPL-MCNC: 4.3 G/DL (ref 3.8–4.8)
ALP SERPL-CCNC: 182 IU/L (ref 44–121)
ALT SERPL-CCNC: 6 IU/L (ref 0–44)
AST SERPL-CCNC: 32 IU/L (ref 0–40)
BASOPHILS # BLD AUTO: 0 X10E3/UL (ref 0–0.2)
BASOPHILS NFR BLD AUTO: 1 %
BILIRUB SERPL-MCNC: 0.5 MG/DL (ref 0–1.2)
BUN SERPL-MCNC: 16 MG/DL (ref 8–27)
BUN/CREAT SERPL: 15 (ref 10–24)
CALCIUM SERPL-MCNC: 9.6 MG/DL (ref 8.6–10.2)
CHLORIDE SERPL-SCNC: 99 MMOL/L (ref 96–106)
CO2 SERPL-SCNC: 23 MMOL/L (ref 20–29)
CREAT SERPL-MCNC: 1.1 MG/DL (ref 0.76–1.27)
DIGOXIN SERPL-MCNC: 0.5 NG/ML (ref 0.5–0.9)
EGFRCR SERPLBLD CKD-EPI 2021: 68 ML/MIN/1.73
EOSINOPHIL # BLD AUTO: 0.1 X10E3/UL (ref 0–0.4)
EOSINOPHIL NFR BLD AUTO: 2 %
ERYTHROCYTE [DISTWIDTH] IN BLOOD BY AUTOMATED COUNT: 14 % (ref 11.6–15.4)
GLOBULIN SER CALC-MCNC: 2.8 G/DL (ref 1.5–4.5)
GLUCOSE SERPL-MCNC: 87 MG/DL (ref 70–99)
HCT VFR BLD AUTO: 44.4 % (ref 37.5–51)
HGB BLD-MCNC: 14 G/DL (ref 13–17.7)
IMM GRANULOCYTES # BLD AUTO: 0 X10E3/UL (ref 0–0.1)
IMM GRANULOCYTES NFR BLD AUTO: 0 %
INR PPP: 1.8 (ref 0.9–1.2)
LDH SERPL L TO P-CCNC: 190 IU/L (ref 121–224)
LYMPHOCYTES # BLD AUTO: 0.7 X10E3/UL (ref 0.7–3.1)
LYMPHOCYTES NFR BLD AUTO: 19 %
MAGNESIUM SERPL-MCNC: 2.2 MG/DL (ref 1.6–2.3)
MCH RBC QN AUTO: 26.2 PG (ref 26.6–33)
MCHC RBC AUTO-ENTMCNC: 31.5 G/DL (ref 31.5–35.7)
MCV RBC AUTO: 83 FL (ref 79–97)
MONOCYTES # BLD AUTO: 0.4 X10E3/UL (ref 0.1–0.9)
MONOCYTES NFR BLD AUTO: 10 %
NEUTROPHILS # BLD AUTO: 2.6 X10E3/UL (ref 1.4–7)
NEUTROPHILS NFR BLD AUTO: 68 %
NT-PROBNP SERPL-MCNC: 824 PG/ML (ref 0–486)
PLATELET # BLD AUTO: 216 X10E3/UL (ref 150–450)
POTASSIUM SERPL-SCNC: 4.4 MMOL/L (ref 3.5–5.2)
PROT SERPL-MCNC: 7.1 G/DL (ref 6–8.5)
PROTHROMBIN TIME: 19.5 SEC (ref 9.1–12)
RBC # BLD AUTO: 5.35 X10E6/UL (ref 4.14–5.8)
SODIUM SERPL-SCNC: 142 MMOL/L (ref 134–144)
WBC # BLD AUTO: 3.8 X10E3/UL (ref 3.4–10.8)

## (undated) LAB — INR, EXTERNAL: 2.4

## (undated) DEVICE — FORCEPS BX L240CM JAW DIA2.8MM L CAP W/ NDL MIC MESH TOOTH

## (undated) DEVICE — CONNECTOR DRNGE W3/8-0.5XH3/16XL3/16IN 2:1 SIL CARD STR

## (undated) DEVICE — Device: Brand: CLEANCUT ROTATING AORTIC PUNCH, 4.5 MM

## (undated) DEVICE — Device

## (undated) DEVICE — CATHETER,FOLEY,100%SILICONE,16FR,10ML,LF: Brand: MEDLINE

## (undated) DEVICE — GLIDESHEATH SLENDER STAINLESS STEEL KIT: Brand: GLIDESHEATH SLENDER

## (undated) DEVICE — SOL INJ SOD CL 0.9% 500ML BG --

## (undated) DEVICE — DRAPE PRB US TRNSDCR 6X96IN --

## (undated) DEVICE — CANNULA PERF 25FR L30IN MULTISTAGE FEM VEN W/ INSRT KT

## (undated) DEVICE — KIT HND CTRL 3 W STPCOCK ROT END 54IN PREM HI PRSS TBNG AT

## (undated) DEVICE — WRAP SURG W1.31XL1.34M CARD FOR PT 165-172CM THERMOWRP

## (undated) DEVICE — PACK PROCEDURE SURG HRT CATH

## (undated) DEVICE — CONNECTOR DRNGE 3/8 1/2X3/16X3/16IN BASE L5MM ARM L10-13MM

## (undated) DEVICE — SPECIAL PROCEDURE DRAPE 32" X 34": Brand: SPECIAL PROCEDURE DRAPE

## (undated) DEVICE — WASTE KIT - ST MARY: Brand: MEDLINE INDUSTRIES, INC.

## (undated) DEVICE — INTENDED TO STANDARDIZE OR CAMERAS TO ALLOW FOR THE USE OF THE OR CAMERA COVER: Brand: ASPEN® O.R. CAMERA COVER

## (undated) DEVICE — PRESSURE MONITORING SET: Brand: TRUWAVE

## (undated) DEVICE — SOLUTION IV 1000ML PH 7.4 INJ NRMSOL R

## (undated) DEVICE — GOWN,SIRUS,NONRNF,SETINSLV,XL,20/CS: Brand: MEDLINE

## (undated) DEVICE — FELT SURG W6XL6IN THK1.65MM PTFE FOR THE REP OF SEPT DEFCT

## (undated) DEVICE — YANKAUER,BULB TIP,W/O VENT,RIGID,STERILE: Brand: MEDLINE

## (undated) DEVICE — OPEN HEART B-RICHMOND: Brand: MEDLINE INDUSTRIES, INC.

## (undated) DEVICE — 40418 TRENDELENBURG ONE-STEP ARM PROTECTORS LARGE (1 PAIR): Brand: 40418 TRENDELENBURG ONE-STEP ARM PROTECTORS LARGE (1 PAIR)

## (undated) DEVICE — GLUE TISS 10ML PLAS STD SPRD TIP SYR PLUNG PREFIL SKIN CLSR 5PK

## (undated) DEVICE — MEDI-TRACE CADENCE ADULT, DEFIBRILLATION ELECTRODE -RTS  (10 PR/PK) - PHILIPS: Brand: MEDI-TRACE CADENCE

## (undated) DEVICE — AVID DUAL STAGE VENOUS DRAINAGE CANNULA: Brand: AVID DUAL STAGE VENOUS DRAINAGE CANNULA

## (undated) DEVICE — CATHETER THRMDIL 6FR 110CM BAL TORQ CTRL

## (undated) DEVICE — BLADE ELECTRODE: Brand: EDGE

## (undated) DEVICE — SUMP PERICARD AD 20FR WT TIP VERSATILE DSGN MULT PRT VENT

## (undated) DEVICE — DRSG BORDR MPLX HEEL 8.7X9.1IN --

## (undated) DEVICE — Device: Brand: VASCULAR DILATOR KIT

## (undated) DEVICE — OPEN HEART A- RICHMOND: Brand: MEDLINE INDUSTRIES, INC.

## (undated) DEVICE — TR BAND RADIAL ARTERY COMPRESSION DEVICE: Brand: TR BAND

## (undated) DEVICE — TIDISHIELD TRANSPORT, CONTAINMENT COVER FOR BACK TABLE 4'6" (1.37M) TO 8' (2.43M) IN LENGTH: Brand: TIDISHIELD

## (undated) DEVICE — PLEDGET SURG W3/16XL0.25IN THK1.65MM PTFE OVL FELT FOR THE

## (undated) DEVICE — COATED BRAID POLY

## (undated) DEVICE — SUMP INTCARD W/ 1/4INCH CONN 20FRENCH 15INCH DLP

## (undated) DEVICE — APPLICATOR SEAL FLOSEAL 5MM+ --

## (undated) DEVICE — SOLUTION SCRB 4OZ 4% CHG H2O AIDED FOR PREOPERATIVE SKIN

## (undated) DEVICE — MICROPUNCTURE INTRODUCER SET SILHOUETTE TRANSITIONLESS WITH STAINLESS STEEL WIRE GUIDE: Brand: MICROPUNCTURE

## (undated) DEVICE — SUTURE PROL SZ 4-0 L36IN NONABSORBABLE BLU L26MM SH 1/2 CIR 8521H

## (undated) DEVICE — KIT MED IMAG CNTRST AGNT W/ IOPAMIDOL REUSE

## (undated) DEVICE — KIT MFLD ISOLATN NACL CNTRST PRT TBNG SPIK W/ PRSS TRNSDUC

## (undated) DEVICE — RUNTHROUGH NS EXTRA FLOPPY PTCA GUIDEWIRE: Brand: RUNTHROUGH

## (undated) DEVICE — TUBING HYDR IRR --

## (undated) DEVICE — CO-SET DELIVERY SYSTEM FOR 123 ROOM TEMPATURE INJECTATE: Brand: CO-SET+

## (undated) DEVICE — CATHETER ANGIO 4FR L100CM S STL NYL JL3.5 3 SEG BRAID SFT

## (undated) DEVICE — DRAIN,WOUND,ROUND,24FR,5/16",FULL-FLUTED: Brand: MEDLINE

## (undated) DEVICE — CATHETER DIAG AD 4FR L100CM STD NYL JUDKINS R 4 TRULUMEN

## (undated) DEVICE — SNARE VASC L240CM LOOP W10MM SHTH DIA2.4MM RND STIFF CLD

## (undated) DEVICE — SUT PROL 4-0 36IN RB1 DA BLU --

## (undated) DEVICE — SPONGE DRAIN NONWOVEN 4X4IN -- 2/PK

## (undated) DEVICE — ANGIOGRAPHY KIT

## (undated) DEVICE — PROVE COVER: Brand: UNBRANDED

## (undated) DEVICE — 1000ML,PRESSURE INFUSER W/STOPCOCK: Brand: MEDLINE

## (undated) DEVICE — SYR 50ML LR LCK 1ML GRAD NSAF --

## (undated) DEVICE — SUTURE MCRYL SZ 3-0 L27IN ABSRB UD L24MM PS-1 3/8 CIR PRIM Y936H

## (undated) DEVICE — CATHETER DIAG 6FR L110CM INTRO 6FR BLLN DIA9MM 1CC PULM ART

## (undated) DEVICE — SOLUTION IV 1000ML 140MEQ/L SOD 5MEQ/L K 3MEQ/L MG 27MEQ/L

## (undated) DEVICE — ANGIOGRAPHIC CATHETER: Brand: IMPULSE™

## (undated) DEVICE — 6 FOOT DISPOSABLE EXTENSION CABLE WITH SAFE CONNECT / SCREW-DOWN

## (undated) DEVICE — SUTURE VCRL SZ 0 L18IN ABSRB VLT L40MM CT 1/2 CIR J752D

## (undated) DEVICE — CANNULA SUCT L8.5IN DIA20FR VENT CONN 3/8IN ART MTL CRV TIP

## (undated) DEVICE — GLIDESHEATH SLENDER ACCESS KIT: Brand: GLIDESHEATH SLENDER

## (undated) DEVICE — TRANSFER PK BLD PROD 300ML --

## (undated) DEVICE — SYR 10ML LUER LOK 1/5ML GRAD --

## (undated) DEVICE — SUTURE DEK POLY GRN BR SZ3 0 T 2 2N 6716

## (undated) DEVICE — 3M™ TEGADERM™ TRANSPARENT FILM DRESSING FRAME STYLE, 1629, 8 IN X 12 IN (20 CM X 30 CM), 10/CT 8CT/CASE: Brand: 3M™ TEGADERM™

## (undated) DEVICE — CATHETER DRAINAGE STR 13 FRX32 CM VENTRICULAR BULL TIP STYL

## (undated) DEVICE — SUTURE PROL SZ 5-0 L30IN NONABSORBABLE BLU L13MM RB-2 1/2 8710H

## (undated) DEVICE — ABSORBABLE COLLAGEN HEMOSTATIC SPONGE, 3IN X 4IN, 5MM THICK: Brand: HELISTAT ® ABSORBABLE COLLAGEN HEMOSTATIC SPONGE

## (undated) DEVICE — ANGIO-SEAL VIP VASCULAR CLOSURE DEVICE: Brand: ANGIO-SEAL

## (undated) DEVICE — GUIDEWIRE VASC L260CM DIA0.035IN TIP L3MM STD EXCHG PTFE J

## (undated) DEVICE — SYR 3ML LL TIP 1/10ML GRAD --

## (undated) DEVICE — PINNACLE INTRODUCER SHEATH: Brand: PINNACLE

## (undated) DEVICE — SUTURE SZ 7 L18IN NONABSORBABLE SIL CCS L48MM 1/2 CIR STRNM M655G

## (undated) DEVICE — FLOSEAL HEMOSTATIC MATRIX, 10ML: Brand: FLOSEAL HEMOSTATIC MATRIX

## (undated) DEVICE — DEVICE SECUREMENT 1/32IN POLYETH FOAM F ANCHR URIN CATH

## (undated) DEVICE — Device: Brand: JELCO

## (undated) DEVICE — BAG RED 3PLY 2MIL 30X40 IN

## (undated) DEVICE — GLOVE SURG SZ 65 L12IN FNGR THK94MIL STD WHT LTX FREE

## (undated) DEVICE — CATHETER IV 14GA L2IN POLYUR STR ORNG HUB SFTY RADPQ DISP

## (undated) DEVICE — TRAP SURG QUAD PARABOLA SLOT DSGN SFTY SCRN TRAPEASE